# Patient Record
Sex: FEMALE | Race: WHITE | NOT HISPANIC OR LATINO | Employment: OTHER
[De-identification: names, ages, dates, MRNs, and addresses within clinical notes are randomized per-mention and may not be internally consistent; named-entity substitution may affect disease eponyms.]

---

## 2017-02-09 ENCOUNTER — RECORDS - HEALTHEAST (OUTPATIENT)
Dept: ADMINISTRATIVE | Facility: OTHER | Age: 72
End: 2017-02-09

## 2017-02-15 ENCOUNTER — RECORDS - HEALTHEAST (OUTPATIENT)
Dept: ADMINISTRATIVE | Facility: OTHER | Age: 72
End: 2017-02-15

## 2017-02-28 ENCOUNTER — OFFICE VISIT - HEALTHEAST (OUTPATIENT)
Dept: OTOLARYNGOLOGY | Facility: CLINIC | Age: 72
End: 2017-02-28

## 2017-02-28 ENCOUNTER — RECORDS - HEALTHEAST (OUTPATIENT)
Dept: ADMINISTRATIVE | Facility: OTHER | Age: 72
End: 2017-02-28

## 2017-02-28 DIAGNOSIS — J32.0 LEFT MAXILLARY SINUSITIS: ICD-10-CM

## 2017-02-28 DIAGNOSIS — R49.0 DYSPHONIA: ICD-10-CM

## 2017-02-28 ASSESSMENT — MIFFLIN-ST. JEOR: SCORE: 1100.97

## 2017-03-01 ENCOUNTER — HOSPITAL ENCOUNTER (OUTPATIENT)
Dept: CARDIOLOGY | Facility: CLINIC | Age: 72
Discharge: HOME | End: 2017-03-01
Attending: INTERNAL MEDICINE

## 2017-03-01 DIAGNOSIS — I42.9 CARDIOMYOPATHY, IDIOPATHIC (H): ICD-10-CM

## 2017-03-01 LAB
AORTIC ROOT: 2.7 CM
BSA FOR ECHO PROCEDURE: 1.71 M2
CV BLOOD PRESSURE: NORMAL MMHG
CV ECHO HEIGHT: 60 IN
CV ECHO WEIGHT: 152 LBS
DOP CALC LVOT AREA: 2.54 CM2
DOP CALC LVOT DIAMETER: 1.8 CM
DOP CALC LVOT PEAK VEL: 68.4 CM/S
DOP CALC LVOT STROKE VOLUME: 34.1 CM3
DOP CALCLVOT PEAK VEL VTI: 13.4 CM
EJECTION FRACTION: 40 % (ref 55–75)
FRACTIONAL SHORTENING: 21.8 % (ref 28–44)
INTERVENTRICULAR SEPTUM IN END DIASTOLE: 0.88 CM (ref 0.6–0.9)
IVS/PW RATIO: 1
LA AREA 1: 14.4 CM2
LA AREA 2: 14.5 CM2
LEFT ATRIUM LENGTH: 4.85 CM
LEFT ATRIUM SIZE: 3.4 CM
LEFT ATRIUM TO AORTIC ROOT RATIO: 1.26 NO UNITS
LEFT ATRIUM VOLUME INDEX: 21.4 ML/M2
LEFT ATRIUM VOLUME: 36.6 CM3
LEFT VENTRICLE CARDIAC INDEX: 1.5 L/MIN/M2
LEFT VENTRICLE CARDIAC OUTPUT: 2.6 L/MIN
LEFT VENTRICLE DIASTOLIC VOLUME INDEX: 58.5 CM3/M2 (ref 34–74)
LEFT VENTRICLE DIASTOLIC VOLUME: 100 CM3 (ref 46–106)
LEFT VENTRICLE HEART RATE: 75 BPM
LEFT VENTRICLE MASS INDEX: 111.3 G/M2
LEFT VENTRICLE SYSTOLIC VOLUME INDEX: 35.1 CM3/M2 (ref 11–31)
LEFT VENTRICLE SYSTOLIC VOLUME: 60 CM3 (ref 14–42)
LEFT VENTRICULAR INTERNAL DIMENSION IN DIASTOLE: 5.63 CM (ref 3.8–5.2)
LEFT VENTRICULAR INTERNAL DIMENSION IN SYSTOLE: 4.4 CM (ref 2.2–3.5)
LEFT VENTRICULAR MASS: 190.4 G
LEFT VENTRICULAR OUTFLOW TRACT MEAN GRADIENT: 1 MMHG
LEFT VENTRICULAR OUTFLOW TRACT MEAN VELOCITY: 50.6 CM/S
LEFT VENTRICULAR OUTFLOW TRACT PEAK GRADIENT: 2 MMHG
LEFT VENTRICULAR POSTERIOR WALL IN END DIASTOLE: 0.9 CM (ref 0.6–0.9)
LV STROKE VOLUME INDEX: 19.9 ML/M2
MITRAL VALVE E/A RATIO: 0.9
MV AVERAGE E/E' RATIO: 15.4 CM/S
MV DECELERATION TIME: 306 MS
MV E'TISSUE VEL-LAT: 6.04 CM/S
MV E'TISSUE VEL-MED: 5.65 CM/S
MV LATERAL E/E' RATIO: 15
MV MEDIAL E/E' RATIO: 16
MV PEAK A VELOCITY: 99.7 CM/S
MV PEAK E VELOCITY: 90.3 CM/S
NUC REST DIASTOLIC VOLUME INDEX: 2432 LBS
NUC REST SYSTOLIC VOLUME INDEX: 60 IN
TRICUSPID REGURGITATION PEAK PRESSURE GRADIENT: 34.1 MMHG
TRICUSPID VALVE PEAK REGURGITANT VELOCITY: 292 CM/S

## 2017-03-01 ASSESSMENT — MIFFLIN-ST. JEOR: SCORE: 1100.97

## 2017-03-03 ENCOUNTER — COMMUNICATION - HEALTHEAST (OUTPATIENT)
Dept: OTOLARYNGOLOGY | Facility: CLINIC | Age: 72
End: 2017-03-03

## 2017-03-10 ENCOUNTER — COMMUNICATION - HEALTHEAST (OUTPATIENT)
Dept: PULMONOLOGY | Facility: OTHER | Age: 72
End: 2017-03-10

## 2017-03-13 ENCOUNTER — AMBULATORY - HEALTHEAST (OUTPATIENT)
Dept: PULMONOLOGY | Facility: OTHER | Age: 72
End: 2017-03-13

## 2017-03-13 DIAGNOSIS — F17.210 CIGARETTE NICOTINE DEPENDENCE, UNCOMPLICATED: ICD-10-CM

## 2017-03-13 DIAGNOSIS — F17.200 NICOTINE DEPENDENCE: ICD-10-CM

## 2017-03-15 ENCOUNTER — OFFICE VISIT - HEALTHEAST (OUTPATIENT)
Dept: PULMONOLOGY | Facility: OTHER | Age: 72
End: 2017-03-15

## 2017-03-15 ENCOUNTER — HOSPITAL ENCOUNTER (OUTPATIENT)
Dept: CT IMAGING | Facility: HOSPITAL | Age: 72
Discharge: HOME | End: 2017-03-15
Attending: INTERNAL MEDICINE

## 2017-03-15 DIAGNOSIS — F17.200 NICOTINE DEPENDENCE: ICD-10-CM

## 2017-03-15 DIAGNOSIS — F17.211 NICOTINE DEPENDENCE, CIGARETTES, IN REMISSION: ICD-10-CM

## 2017-03-15 DIAGNOSIS — J44.9 COPD (CHRONIC OBSTRUCTIVE PULMONARY DISEASE) (H): ICD-10-CM

## 2017-03-15 DIAGNOSIS — F17.210 CIGARETTE NICOTINE DEPENDENCE, UNCOMPLICATED: ICD-10-CM

## 2017-03-15 RX ORDER — CHLORHEXIDINE GLUCONATE ORAL RINSE 1.2 MG/ML
15 SOLUTION DENTAL
Status: SHIPPED | COMMUNITY
Start: 2017-03-15

## 2017-04-24 ENCOUNTER — COMMUNICATION - HEALTHEAST (OUTPATIENT)
Dept: ADMINISTRATIVE | Facility: CLINIC | Age: 72
End: 2017-04-24

## 2017-05-14 ENCOUNTER — COMMUNICATION - HEALTHEAST (OUTPATIENT)
Dept: CARDIOLOGY | Facility: CLINIC | Age: 72
End: 2017-05-14

## 2017-05-17 ENCOUNTER — RECORDS - HEALTHEAST (OUTPATIENT)
Dept: ADMINISTRATIVE | Facility: OTHER | Age: 72
End: 2017-05-17

## 2017-05-22 ENCOUNTER — AMBULATORY - HEALTHEAST (OUTPATIENT)
Dept: CARDIOLOGY | Facility: CLINIC | Age: 72
End: 2017-05-22

## 2017-05-22 ENCOUNTER — RECORDS - HEALTHEAST (OUTPATIENT)
Dept: ADMINISTRATIVE | Facility: OTHER | Age: 72
End: 2017-05-22

## 2017-05-22 DIAGNOSIS — R09.89 LEFT CAROTID BRUIT: ICD-10-CM

## 2017-05-23 ENCOUNTER — RECORDS - HEALTHEAST (OUTPATIENT)
Dept: ADMINISTRATIVE | Facility: OTHER | Age: 72
End: 2017-05-23

## 2017-05-30 ENCOUNTER — HOSPITAL ENCOUNTER (OUTPATIENT)
Dept: ULTRASOUND IMAGING | Facility: CLINIC | Age: 72
Discharge: HOME | End: 2017-05-30
Attending: INTERNAL MEDICINE

## 2017-05-30 ENCOUNTER — COMMUNICATION - HEALTHEAST (OUTPATIENT)
Dept: CARDIOLOGY | Facility: CLINIC | Age: 72
End: 2017-05-30

## 2017-05-30 DIAGNOSIS — R09.89 LEFT CAROTID BRUIT: ICD-10-CM

## 2017-06-01 ENCOUNTER — COMMUNICATION - HEALTHEAST (OUTPATIENT)
Dept: CARDIOLOGY | Facility: CLINIC | Age: 72
End: 2017-06-01

## 2017-06-01 ENCOUNTER — RECORDS - HEALTHEAST (OUTPATIENT)
Dept: ADMINISTRATIVE | Facility: OTHER | Age: 72
End: 2017-06-01

## 2017-06-06 ENCOUNTER — OFFICE VISIT - HEALTHEAST (OUTPATIENT)
Dept: CARDIOLOGY | Facility: CLINIC | Age: 72
End: 2017-06-06

## 2017-06-06 ENCOUNTER — AMBULATORY - HEALTHEAST (OUTPATIENT)
Dept: CARDIOLOGY | Facility: CLINIC | Age: 72
End: 2017-06-06

## 2017-06-06 ENCOUNTER — RECORDS - HEALTHEAST (OUTPATIENT)
Dept: ADMINISTRATIVE | Facility: OTHER | Age: 72
End: 2017-06-06

## 2017-06-06 DIAGNOSIS — I10 ESSENTIAL HYPERTENSION: ICD-10-CM

## 2017-06-06 DIAGNOSIS — R06.09 DYSPNEA ON EXERTION: ICD-10-CM

## 2017-06-06 DIAGNOSIS — I65.22 CAROTID STENOSIS, ASYMPTOMATIC, LEFT: ICD-10-CM

## 2017-06-06 DIAGNOSIS — I42.9 CARDIOMYOPATHY, IDIOPATHIC (H): ICD-10-CM

## 2017-06-06 ASSESSMENT — MIFFLIN-ST. JEOR: SCORE: 1114.58

## 2017-06-09 ENCOUNTER — RECORDS - HEALTHEAST (OUTPATIENT)
Dept: ADMINISTRATIVE | Facility: OTHER | Age: 72
End: 2017-06-09

## 2017-06-20 ENCOUNTER — RECORDS - HEALTHEAST (OUTPATIENT)
Dept: ADMINISTRATIVE | Facility: OTHER | Age: 72
End: 2017-06-20

## 2017-06-30 ENCOUNTER — RECORDS - HEALTHEAST (OUTPATIENT)
Dept: ADMINISTRATIVE | Facility: OTHER | Age: 72
End: 2017-06-30

## 2017-07-24 ENCOUNTER — COMMUNICATION - HEALTHEAST (OUTPATIENT)
Dept: ONCOLOGY | Facility: HOSPITAL | Age: 72
End: 2017-07-24

## 2017-07-24 ENCOUNTER — RECORDS - HEALTHEAST (OUTPATIENT)
Dept: ADMINISTRATIVE | Facility: OTHER | Age: 72
End: 2017-07-24

## 2017-07-25 ENCOUNTER — COMMUNICATION - HEALTHEAST (OUTPATIENT)
Dept: ONCOLOGY | Facility: HOSPITAL | Age: 72
End: 2017-07-25

## 2017-07-27 ENCOUNTER — RECORDS - HEALTHEAST (OUTPATIENT)
Dept: RADIOLOGY | Facility: CLINIC | Age: 72
End: 2017-07-27

## 2017-07-27 ENCOUNTER — OFFICE VISIT - HEALTHEAST (OUTPATIENT)
Dept: RADIATION ONCOLOGY | Facility: CLINIC | Age: 72
End: 2017-07-27

## 2017-07-27 ENCOUNTER — COMMUNICATION - HEALTHEAST (OUTPATIENT)
Dept: ONCOLOGY | Facility: HOSPITAL | Age: 72
End: 2017-07-27

## 2017-07-27 ENCOUNTER — RECORDS - HEALTHEAST (OUTPATIENT)
Dept: ADMINISTRATIVE | Facility: OTHER | Age: 72
End: 2017-07-27

## 2017-07-27 DIAGNOSIS — C50.112 MALIGNANT NEOPLASM OF CENTRAL PORTION OF LEFT FEMALE BREAST (H): ICD-10-CM

## 2017-07-27 ASSESSMENT — MIFFLIN-ST. JEOR: SCORE: 1105.5

## 2017-07-31 ENCOUNTER — RECORDS - HEALTHEAST (OUTPATIENT)
Dept: ADMINISTRATIVE | Facility: OTHER | Age: 72
End: 2017-07-31

## 2017-08-01 ENCOUNTER — COMMUNICATION - HEALTHEAST (OUTPATIENT)
Dept: ONCOLOGY | Facility: CLINIC | Age: 72
End: 2017-08-01

## 2017-08-01 ENCOUNTER — OFFICE VISIT - HEALTHEAST (OUTPATIENT)
Dept: ONCOLOGY | Facility: CLINIC | Age: 72
End: 2017-08-01

## 2017-08-01 DIAGNOSIS — C50.112 MALIGNANT NEOPLASM OF CENTRAL PORTION OF LEFT FEMALE BREAST (H): ICD-10-CM

## 2017-08-01 ASSESSMENT — MIFFLIN-ST. JEOR: SCORE: 1105.5

## 2017-08-02 ENCOUNTER — COMMUNICATION - HEALTHEAST (OUTPATIENT)
Dept: ONCOLOGY | Facility: HOSPITAL | Age: 72
End: 2017-08-02

## 2017-08-02 ENCOUNTER — HOSPITAL ENCOUNTER (OUTPATIENT)
Dept: CT IMAGING | Facility: CLINIC | Age: 72
Discharge: HOME | End: 2017-08-02

## 2017-08-02 DIAGNOSIS — M25.552 PAIN OF LEFT HIP JOINT: ICD-10-CM

## 2017-08-03 ENCOUNTER — AMBULATORY - HEALTHEAST (OUTPATIENT)
Dept: RADIATION ONCOLOGY | Facility: HOSPITAL | Age: 72
End: 2017-08-03

## 2017-08-03 ENCOUNTER — RECORDS - HEALTHEAST (OUTPATIENT)
Dept: ADMINISTRATIVE | Facility: OTHER | Age: 72
End: 2017-08-03

## 2017-08-09 ENCOUNTER — COMMUNICATION - HEALTHEAST (OUTPATIENT)
Dept: CARDIOLOGY | Facility: CLINIC | Age: 72
End: 2017-08-09

## 2017-08-14 ENCOUNTER — COMMUNICATION - HEALTHEAST (OUTPATIENT)
Dept: ONCOLOGY | Facility: HOSPITAL | Age: 72
End: 2017-08-14

## 2017-08-17 ENCOUNTER — COMMUNICATION - HEALTHEAST (OUTPATIENT)
Dept: CARDIOLOGY | Facility: CLINIC | Age: 72
End: 2017-08-17

## 2017-08-23 ENCOUNTER — OFFICE VISIT - HEALTHEAST (OUTPATIENT)
Dept: RADIATION ONCOLOGY | Facility: CLINIC | Age: 72
End: 2017-08-23

## 2017-08-23 DIAGNOSIS — C50.112 MALIGNANT NEOPLASM OF CENTRAL PORTION OF LEFT FEMALE BREAST (H): ICD-10-CM

## 2017-08-23 ASSESSMENT — MIFFLIN-ST. JEOR: SCORE: 1098.25

## 2017-08-30 ENCOUNTER — OFFICE VISIT - HEALTHEAST (OUTPATIENT)
Dept: RADIATION ONCOLOGY | Facility: CLINIC | Age: 72
End: 2017-08-30

## 2017-08-30 DIAGNOSIS — C50.112 MALIGNANT NEOPLASM OF CENTRAL PORTION OF LEFT FEMALE BREAST (H): ICD-10-CM

## 2017-08-30 ASSESSMENT — MIFFLIN-ST. JEOR: SCORE: 1095.52

## 2017-09-06 ENCOUNTER — OFFICE VISIT - HEALTHEAST (OUTPATIENT)
Dept: RADIATION ONCOLOGY | Facility: CLINIC | Age: 72
End: 2017-09-06

## 2017-09-06 ENCOUNTER — AMBULATORY - HEALTHEAST (OUTPATIENT)
Dept: RADIATION ONCOLOGY | Facility: CLINIC | Age: 72
End: 2017-09-06

## 2017-09-06 DIAGNOSIS — C50.112 MALIGNANT NEOPLASM OF CENTRAL PORTION OF LEFT FEMALE BREAST (H): ICD-10-CM

## 2017-09-08 ENCOUNTER — OFFICE VISIT - HEALTHEAST (OUTPATIENT)
Dept: RADIATION ONCOLOGY | Facility: CLINIC | Age: 72
End: 2017-09-08

## 2017-09-08 DIAGNOSIS — C50.112 MALIGNANT NEOPLASM OF CENTRAL PORTION OF LEFT FEMALE BREAST (H): ICD-10-CM

## 2017-09-08 ASSESSMENT — MIFFLIN-ST. JEOR: SCORE: 1103.69

## 2017-09-12 ENCOUNTER — OFFICE VISIT - HEALTHEAST (OUTPATIENT)
Dept: ONCOLOGY | Facility: CLINIC | Age: 72
End: 2017-09-12

## 2017-09-12 DIAGNOSIS — C50.112 MALIGNANT NEOPLASM OF CENTRAL PORTION OF LEFT FEMALE BREAST (H): ICD-10-CM

## 2017-09-18 ENCOUNTER — COMMUNICATION - HEALTHEAST (OUTPATIENT)
Dept: RADIATION ONCOLOGY | Facility: CLINIC | Age: 72
End: 2017-09-18

## 2017-09-25 ENCOUNTER — COMMUNICATION - HEALTHEAST (OUTPATIENT)
Dept: ONCOLOGY | Facility: CLINIC | Age: 72
End: 2017-09-25

## 2017-11-09 ENCOUNTER — AMBULATORY - HEALTHEAST (OUTPATIENT)
Dept: PULMONOLOGY | Facility: OTHER | Age: 72
End: 2017-11-09

## 2017-11-09 DIAGNOSIS — F17.210 CIGARETTE NICOTINE DEPENDENCE WITHOUT COMPLICATION: ICD-10-CM

## 2017-11-28 ENCOUNTER — COMMUNICATION - HEALTHEAST (OUTPATIENT)
Dept: CARDIOLOGY | Facility: CLINIC | Age: 72
End: 2017-11-28

## 2017-12-01 ENCOUNTER — COMMUNICATION - HEALTHEAST (OUTPATIENT)
Dept: ONCOLOGY | Facility: CLINIC | Age: 72
End: 2017-12-01

## 2017-12-06 ENCOUNTER — OFFICE VISIT - HEALTHEAST (OUTPATIENT)
Dept: ONCOLOGY | Facility: CLINIC | Age: 72
End: 2017-12-06

## 2017-12-06 DIAGNOSIS — Z17.0 MALIGNANT NEOPLASM OF CENTRAL PORTION OF LEFT BREAST IN FEMALE, ESTROGEN RECEPTOR POSITIVE (H): ICD-10-CM

## 2017-12-06 DIAGNOSIS — C50.112 MALIGNANT NEOPLASM OF CENTRAL PORTION OF LEFT BREAST IN FEMALE, ESTROGEN RECEPTOR POSITIVE (H): ICD-10-CM

## 2017-12-06 ASSESSMENT — MIFFLIN-ST. JEOR: SCORE: 1061.96

## 2017-12-20 ENCOUNTER — COMMUNICATION - HEALTHEAST (OUTPATIENT)
Dept: ONCOLOGY | Facility: CLINIC | Age: 72
End: 2017-12-20

## 2018-01-08 ENCOUNTER — COMMUNICATION - HEALTHEAST (OUTPATIENT)
Dept: ONCOLOGY | Facility: CLINIC | Age: 73
End: 2018-01-08

## 2018-01-10 ENCOUNTER — OFFICE VISIT - HEALTHEAST (OUTPATIENT)
Dept: ONCOLOGY | Facility: CLINIC | Age: 73
End: 2018-01-10

## 2018-01-10 ENCOUNTER — COMMUNICATION - HEALTHEAST (OUTPATIENT)
Dept: ONCOLOGY | Facility: CLINIC | Age: 73
End: 2018-01-10

## 2018-01-10 DIAGNOSIS — C50.112 MALIGNANT NEOPLASM OF CENTRAL PORTION OF LEFT BREAST IN FEMALE, ESTROGEN RECEPTOR POSITIVE (H): ICD-10-CM

## 2018-01-10 DIAGNOSIS — Z17.0 MALIGNANT NEOPLASM OF CENTRAL PORTION OF LEFT BREAST IN FEMALE, ESTROGEN RECEPTOR POSITIVE (H): ICD-10-CM

## 2018-01-18 ENCOUNTER — COMMUNICATION - HEALTHEAST (OUTPATIENT)
Dept: CARDIOLOGY | Facility: CLINIC | Age: 73
End: 2018-01-18

## 2018-01-24 ENCOUNTER — RECORDS - HEALTHEAST (OUTPATIENT)
Dept: ADMINISTRATIVE | Facility: OTHER | Age: 73
End: 2018-01-24

## 2018-01-25 NOTE — TELEPHONE ENCOUNTER
APPT INFO    Date /Time: 3/5/18 at 11:30AM    Reason for Appt: Chronic cough   Ref Provider/Clinic: [facility]   Are there internal records? Yes/No?  IF YES, list clinic names: No   Are there outside records? Yes/No? Yes - M[facility]al[facility]   Patient Contact (Y/N) & Call Details: Transferred from , spoke to pt, she is being referred from [facility] and has also been seen at [facility] by Dr Mann. Mailed IRENE to pt incase it is needed for appointment.   Action: Faxed imaging requests.     OUTSIDE RECORDS CHECKLIST     CLINIC NAME COMMENTS REC (x) IMG (x)   [facility]  (Use Care Everywhere) OFFICE NOTES: 9/22/15,4/21/16, 5/19/16, 2/9/17, 4/27/17, 6/8/17, 1/18/18  RADIOLOGY: (PACS)  CT Chest 4/22/14, 1/24/18  XR Chest 12/28/15, 1/8/16    PROCEDURES: PFT 6/8/17, 2/9/17   X X   [facility]  (Use Care Everywhere) OFFICE NOTES: 3/15/17  RADIOLOGY: (PACS)  CT Chest 2/26/13  ER/HOSP: 3/29/16 X X

## 2018-02-19 ENCOUNTER — COMMUNICATION - HEALTHEAST (OUTPATIENT)
Dept: ONCOLOGY | Facility: CLINIC | Age: 73
End: 2018-02-19

## 2018-02-19 DIAGNOSIS — Z17.0 MALIGNANT NEOPLASM OF CENTRAL PORTION OF LEFT BREAST IN FEMALE, ESTROGEN RECEPTOR POSITIVE (H): ICD-10-CM

## 2018-02-19 DIAGNOSIS — R22.2 PLEURAL NODULES: ICD-10-CM

## 2018-02-19 DIAGNOSIS — C50.112 MALIGNANT NEOPLASM OF CENTRAL PORTION OF LEFT BREAST IN FEMALE, ESTROGEN RECEPTOR POSITIVE (H): ICD-10-CM

## 2018-02-19 DIAGNOSIS — R23.2 VASOMOTOR FLUSHING: ICD-10-CM

## 2018-02-22 ENCOUNTER — COMMUNICATION - HEALTHEAST (OUTPATIENT)
Dept: ONCOLOGY | Facility: CLINIC | Age: 73
End: 2018-02-22

## 2018-03-05 ENCOUNTER — PRE VISIT (OUTPATIENT)
Dept: OTOLARYNGOLOGY | Facility: CLINIC | Age: 73
End: 2018-03-05

## 2018-03-29 ENCOUNTER — COMMUNICATION - HEALTHEAST (OUTPATIENT)
Dept: ONCOLOGY | Facility: CLINIC | Age: 73
End: 2018-03-29

## 2018-03-29 DIAGNOSIS — R23.2 HOT FLASHES: ICD-10-CM

## 2018-03-30 ENCOUNTER — AMBULATORY - HEALTHEAST (OUTPATIENT)
Dept: ONCOLOGY | Facility: CLINIC | Age: 73
End: 2018-03-30

## 2018-03-30 DIAGNOSIS — T45.1X5A HOT FLASHES DUE TO TAMOXIFEN: ICD-10-CM

## 2018-03-30 DIAGNOSIS — R23.2 HOT FLASHES DUE TO TAMOXIFEN: ICD-10-CM

## 2018-05-08 ENCOUNTER — AMBULATORY - HEALTHEAST (OUTPATIENT)
Dept: ONCOLOGY | Facility: CLINIC | Age: 73
End: 2018-05-08

## 2018-05-08 DIAGNOSIS — T45.1X5A HOT FLASHES DUE TO TAMOXIFEN: ICD-10-CM

## 2018-05-08 DIAGNOSIS — R23.2 HOT FLASHES DUE TO TAMOXIFEN: ICD-10-CM

## 2018-05-15 ENCOUNTER — AMBULATORY - HEALTHEAST (OUTPATIENT)
Dept: ONCOLOGY | Facility: CLINIC | Age: 73
End: 2018-05-15

## 2018-05-15 DIAGNOSIS — R23.2 HOT FLASHES DUE TO TAMOXIFEN: ICD-10-CM

## 2018-05-15 DIAGNOSIS — M79.605 LEG PAIN, LEFT: ICD-10-CM

## 2018-05-15 DIAGNOSIS — T45.1X5A HOT FLASHES DUE TO TAMOXIFEN: ICD-10-CM

## 2018-05-21 ENCOUNTER — RECORDS - HEALTHEAST (OUTPATIENT)
Dept: ADMINISTRATIVE | Facility: OTHER | Age: 73
End: 2018-05-21

## 2018-05-22 ENCOUNTER — AMBULATORY - HEALTHEAST (OUTPATIENT)
Dept: ONCOLOGY | Facility: CLINIC | Age: 73
End: 2018-05-22

## 2018-05-22 DIAGNOSIS — M79.605 LEG PAIN, LEFT: ICD-10-CM

## 2018-05-22 DIAGNOSIS — R23.2 HOT FLASHES DUE TO TAMOXIFEN: ICD-10-CM

## 2018-05-22 DIAGNOSIS — T45.1X5A HOT FLASHES DUE TO TAMOXIFEN: ICD-10-CM

## 2018-05-25 ENCOUNTER — COMMUNICATION - HEALTHEAST (OUTPATIENT)
Dept: CARDIOLOGY | Facility: CLINIC | Age: 73
End: 2018-05-25

## 2018-06-05 ENCOUNTER — AMBULATORY - HEALTHEAST (OUTPATIENT)
Dept: ONCOLOGY | Facility: CLINIC | Age: 73
End: 2018-06-05

## 2018-06-05 DIAGNOSIS — T45.1X5A HOT FLASHES DUE TO TAMOXIFEN: ICD-10-CM

## 2018-06-05 DIAGNOSIS — M79.605 LEG PAIN, LEFT: ICD-10-CM

## 2018-06-05 DIAGNOSIS — R23.2 HOT FLASHES DUE TO TAMOXIFEN: ICD-10-CM

## 2018-06-12 ENCOUNTER — AMBULATORY - HEALTHEAST (OUTPATIENT)
Dept: ONCOLOGY | Facility: CLINIC | Age: 73
End: 2018-06-12

## 2018-06-12 DIAGNOSIS — M79.605 LEG PAIN, LEFT: ICD-10-CM

## 2018-06-12 DIAGNOSIS — R23.2 HOT FLASHES DUE TO TAMOXIFEN: ICD-10-CM

## 2018-06-12 DIAGNOSIS — T45.1X5A HOT FLASHES DUE TO TAMOXIFEN: ICD-10-CM

## 2018-06-19 ENCOUNTER — AMBULATORY - HEALTHEAST (OUTPATIENT)
Dept: ONCOLOGY | Facility: CLINIC | Age: 73
End: 2018-06-19

## 2018-06-19 DIAGNOSIS — T45.1X5A HOT FLASHES DUE TO TAMOXIFEN: ICD-10-CM

## 2018-06-19 DIAGNOSIS — M79.605 LEG PAIN, LEFT: ICD-10-CM

## 2018-06-19 DIAGNOSIS — R23.2 HOT FLASHES DUE TO TAMOXIFEN: ICD-10-CM

## 2018-07-12 ENCOUNTER — AMBULATORY - HEALTHEAST (OUTPATIENT)
Dept: ONCOLOGY | Facility: CLINIC | Age: 73
End: 2018-07-12

## 2018-07-12 ENCOUNTER — OFFICE VISIT - HEALTHEAST (OUTPATIENT)
Dept: ONCOLOGY | Facility: CLINIC | Age: 73
End: 2018-07-12

## 2018-07-12 DIAGNOSIS — N95.1 MENOPAUSAL SYNDROME (HOT FLASHES): ICD-10-CM

## 2018-07-12 DIAGNOSIS — M79.89 RIGHT LEG SWELLING: ICD-10-CM

## 2018-07-12 ASSESSMENT — MIFFLIN-ST. JEOR: SCORE: 1086.91

## 2018-07-17 ENCOUNTER — HOSPITAL ENCOUNTER (OUTPATIENT)
Dept: ULTRASOUND IMAGING | Facility: CLINIC | Age: 73
Setting detail: RADIATION/ONCOLOGY SERIES
Discharge: STILL ADMITTED OR ADMINISTRATIVE | End: 2018-07-17
Attending: INTERNAL MEDICINE

## 2018-07-17 ENCOUNTER — COMMUNICATION - HEALTHEAST (OUTPATIENT)
Dept: ONCOLOGY | Facility: CLINIC | Age: 73
End: 2018-07-17

## 2018-07-17 DIAGNOSIS — M79.89 RIGHT LEG SWELLING: ICD-10-CM

## 2018-08-07 ENCOUNTER — COMMUNICATION - HEALTHEAST (OUTPATIENT)
Dept: ONCOLOGY | Facility: CLINIC | Age: 73
End: 2018-08-07

## 2018-09-12 ENCOUNTER — COMMUNICATION - HEALTHEAST (OUTPATIENT)
Dept: ONCOLOGY | Facility: CLINIC | Age: 73
End: 2018-09-12

## 2018-09-21 ENCOUNTER — COMMUNICATION - HEALTHEAST (OUTPATIENT)
Dept: ONCOLOGY | Facility: CLINIC | Age: 73
End: 2018-09-21

## 2018-09-25 ENCOUNTER — COMMUNICATION - HEALTHEAST (OUTPATIENT)
Dept: ONCOLOGY | Facility: CLINIC | Age: 73
End: 2018-09-25

## 2018-10-04 ENCOUNTER — RECORDS - HEALTHEAST (OUTPATIENT)
Dept: ADMINISTRATIVE | Facility: OTHER | Age: 73
End: 2018-10-04

## 2018-10-05 ENCOUNTER — COMMUNICATION - HEALTHEAST (OUTPATIENT)
Dept: ONCOLOGY | Facility: CLINIC | Age: 73
End: 2018-10-05

## 2018-10-08 ENCOUNTER — RECORDS - HEALTHEAST (OUTPATIENT)
Dept: ADMINISTRATIVE | Facility: OTHER | Age: 73
End: 2018-10-08

## 2018-10-09 ENCOUNTER — RECORDS - HEALTHEAST (OUTPATIENT)
Dept: ADMINISTRATIVE | Facility: OTHER | Age: 73
End: 2018-10-09

## 2018-10-09 ENCOUNTER — HOSPITAL ENCOUNTER (OUTPATIENT)
Dept: CT IMAGING | Facility: CLINIC | Age: 73
Discharge: HOME | End: 2018-10-09

## 2018-10-09 DIAGNOSIS — R06.02 SHORTNESS OF BREATH: ICD-10-CM

## 2018-10-09 LAB
CREAT BLD-MCNC: 1.2 MG/DL
POC GFR AMER AF HE - HISTORICAL: 53 ML/MIN/1.73M2
POC GFR NON AMER AF HE - HISTORICAL: 44 ML/MIN/1.73M2

## 2018-10-24 ENCOUNTER — RECORDS - HEALTHEAST (OUTPATIENT)
Dept: ADMINISTRATIVE | Facility: OTHER | Age: 73
End: 2018-10-24

## 2018-10-29 ENCOUNTER — COMMUNICATION - HEALTHEAST (OUTPATIENT)
Dept: ONCOLOGY | Facility: CLINIC | Age: 73
End: 2018-10-29

## 2018-10-30 ENCOUNTER — AMBULATORY - HEALTHEAST (OUTPATIENT)
Dept: ONCOLOGY | Facility: CLINIC | Age: 73
End: 2018-10-30

## 2018-10-30 DIAGNOSIS — N63.10 BREAST MASS, RIGHT: ICD-10-CM

## 2018-10-30 DIAGNOSIS — N64.9 DISORDER OF BREAST: ICD-10-CM

## 2018-10-30 DIAGNOSIS — N63.13 MASS OF LOWER OUTER QUADRANT OF RIGHT BREAST: ICD-10-CM

## 2018-10-31 ENCOUNTER — HOSPITAL ENCOUNTER (OUTPATIENT)
Dept: CARDIOLOGY | Facility: CLINIC | Age: 73
Discharge: HOME | End: 2018-10-31

## 2018-10-31 DIAGNOSIS — I63.9 OCCIPITAL STROKE (H): ICD-10-CM

## 2018-10-31 ASSESSMENT — MIFFLIN-ST. JEOR: SCORE: 1082.82

## 2018-11-01 LAB
AORTIC ROOT: 2.9 CM
BSA FOR ECHO PROCEDURE: 1.68 M2
CV BLOOD PRESSURE: NORMAL MMHG
CV ECHO HEIGHT: 60 IN
CV ECHO WEIGHT: 148 LBS
DOP CALC LVOT AREA: 2.54 CM2
DOP CALC LVOT DIAMETER: 1.8 CM
DOP CALC LVOT PEAK VEL: 76.7 CM/S
DOP CALC LVOT STROKE VOLUME: 38.4 CM3
DOP CALCLVOT PEAK VEL VTI: 15.1 CM
EJECTION FRACTION: 40 % (ref 55–75)
FRACTIONAL SHORTENING: 15.4 % (ref 28–44)
INTERVENTRICULAR SEPTUM IN END DIASTOLE: 1.19 CM (ref 0.6–0.9)
IVS/PW RATIO: 1.1
LA AREA 1: 17.2 CM2
LA AREA 2: 16.4 CM2
LEFT ATRIUM LENGTH: 4.8 CM
LEFT ATRIUM SIZE: 3.7 CM
LEFT ATRIUM TO AORTIC ROOT RATIO: 1.28 NO UNITS
LEFT ATRIUM VOLUME INDEX: 29.7 ML/M2
LEFT ATRIUM VOLUME: 50 ML
LEFT VENTRICLE CARDIAC INDEX: 1.5 L/MIN/M2
LEFT VENTRICLE CARDIAC OUTPUT: 2.5 L/MIN
LEFT VENTRICLE DIASTOLIC VOLUME INDEX: 50 CM3/M2 (ref 34–74)
LEFT VENTRICLE DIASTOLIC VOLUME: 84 CM3 (ref 46–106)
LEFT VENTRICLE HEART RATE: 65 BPM
LEFT VENTRICLE MASS INDEX: 123.1 G/M2
LEFT VENTRICLE SYSTOLIC VOLUME INDEX: 29.8 CM3/M2 (ref 11–31)
LEFT VENTRICLE SYSTOLIC VOLUME: 50 CM3 (ref 14–42)
LEFT VENTRICULAR INTERNAL DIMENSION IN DIASTOLE: 4.88 CM (ref 3.8–5.2)
LEFT VENTRICULAR INTERNAL DIMENSION IN SYSTOLE: 4.13 CM (ref 2.2–3.5)
LEFT VENTRICULAR MASS: 206.8 G
LEFT VENTRICULAR OUTFLOW TRACT MEAN GRADIENT: 1 MMHG
LEFT VENTRICULAR OUTFLOW TRACT MEAN VELOCITY: 49.2 CM/S
LEFT VENTRICULAR OUTFLOW TRACT PEAK GRADIENT: 2 MMHG
LEFT VENTRICULAR POSTERIOR WALL IN END DIASTOLE: 1.07 CM (ref 0.6–0.9)
LV STROKE VOLUME INDEX: 22.9 ML/M2
MITRAL VALVE E/A RATIO: 0.4
MV AVERAGE E/E' RATIO: 15.1 CM/S
MV DECELERATION TIME: 292 MS
MV E'TISSUE VEL-LAT: 3.7 CM/S
MV E'TISSUE VEL-MED: 3.41 CM/S
MV LATERAL E/E' RATIO: 14.5
MV MEDIAL E/E' RATIO: 15.8
MV PEAK A VELOCITY: 123 CM/S
MV PEAK E VELOCITY: 53.8 CM/S
NUC REST DIASTOLIC VOLUME INDEX: 2368 LBS
NUC REST SYSTOLIC VOLUME INDEX: 60 IN
RIGHT VENTRICULAR INTERNAL DIMENSION IN DYSTOLE: 2.18 CM
TRICUSPID VALVE ANULAR PLANE SYSTOLIC EXCURSION: 1.8 CM

## 2018-11-02 ENCOUNTER — HOSPITAL ENCOUNTER (OUTPATIENT)
Dept: MAMMOGRAPHY | Facility: CLINIC | Age: 73
Setting detail: RADIATION/ONCOLOGY SERIES
Discharge: STILL ADMITTED OR ADMINISTRATIVE | End: 2018-11-02
Attending: INTERNAL MEDICINE

## 2018-11-02 ENCOUNTER — HOSPITAL ENCOUNTER (OUTPATIENT)
Dept: ULTRASOUND IMAGING | Facility: CLINIC | Age: 73
Setting detail: RADIATION/ONCOLOGY SERIES
Discharge: STILL ADMITTED OR ADMINISTRATIVE | End: 2018-11-02
Attending: INTERNAL MEDICINE

## 2018-11-02 DIAGNOSIS — N63.10 BREAST MASS, RIGHT: ICD-10-CM

## 2018-11-02 DIAGNOSIS — N64.9 DISORDER OF BREAST: ICD-10-CM

## 2018-11-02 DIAGNOSIS — N63.13 MASS OF LOWER OUTER QUADRANT OF RIGHT BREAST: ICD-10-CM

## 2018-11-06 ENCOUNTER — RECORDS - HEALTHEAST (OUTPATIENT)
Dept: ADMINISTRATIVE | Facility: OTHER | Age: 73
End: 2018-11-06

## 2018-11-14 ENCOUNTER — OFFICE VISIT - HEALTHEAST (OUTPATIENT)
Dept: ONCOLOGY | Facility: CLINIC | Age: 73
End: 2018-11-14

## 2018-11-14 DIAGNOSIS — C50.112 MALIGNANT NEOPLASM OF CENTRAL PORTION OF LEFT BREAST IN FEMALE, ESTROGEN RECEPTOR POSITIVE (H): ICD-10-CM

## 2018-11-14 DIAGNOSIS — Z17.0 MALIGNANT NEOPLASM OF CENTRAL PORTION OF LEFT BREAST IN FEMALE, ESTROGEN RECEPTOR POSITIVE (H): ICD-10-CM

## 2018-11-14 RX ORDER — AZELASTINE 1 MG/ML
2 SPRAY, METERED NASAL DAILY
Status: SHIPPED | COMMUNITY
Start: 2018-07-19 | End: 2022-04-07

## 2018-11-15 ENCOUNTER — COMMUNICATION - HEALTHEAST (OUTPATIENT)
Dept: ONCOLOGY | Facility: CLINIC | Age: 73
End: 2018-11-15

## 2018-12-11 ENCOUNTER — AMBULATORY - HEALTHEAST (OUTPATIENT)
Dept: ONCOLOGY | Facility: CLINIC | Age: 73
End: 2018-12-11

## 2018-12-11 DIAGNOSIS — Z17.0 MALIGNANT NEOPLASM OF CENTRAL PORTION OF LEFT BREAST IN FEMALE, ESTROGEN RECEPTOR POSITIVE (H): ICD-10-CM

## 2018-12-11 DIAGNOSIS — M79.605 LEG PAIN, LEFT: ICD-10-CM

## 2018-12-11 DIAGNOSIS — C50.112 MALIGNANT NEOPLASM OF CENTRAL PORTION OF LEFT BREAST IN FEMALE, ESTROGEN RECEPTOR POSITIVE (H): ICD-10-CM

## 2018-12-14 ENCOUNTER — COMMUNICATION - HEALTHEAST (OUTPATIENT)
Dept: ONCOLOGY | Facility: HOSPITAL | Age: 73
End: 2018-12-14

## 2018-12-17 ENCOUNTER — COMMUNICATION - HEALTHEAST (OUTPATIENT)
Dept: ONCOLOGY | Facility: CLINIC | Age: 73
End: 2018-12-17

## 2018-12-21 ENCOUNTER — COMMUNICATION - HEALTHEAST (OUTPATIENT)
Dept: ONCOLOGY | Facility: HOSPITAL | Age: 73
End: 2018-12-21

## 2018-12-26 ENCOUNTER — COMMUNICATION - HEALTHEAST (OUTPATIENT)
Dept: ONCOLOGY | Facility: CLINIC | Age: 73
End: 2018-12-26

## 2018-12-31 ENCOUNTER — AMBULATORY - HEALTHEAST (OUTPATIENT)
Dept: CARE COORDINATION | Facility: CLINIC | Age: 73
End: 2018-12-31

## 2019-01-03 ENCOUNTER — OFFICE VISIT - HEALTHEAST (OUTPATIENT)
Dept: ONCOLOGY | Facility: CLINIC | Age: 74
End: 2019-01-03

## 2019-01-03 DIAGNOSIS — R91.8 MULTIPLE LUNG NODULES ON CT: ICD-10-CM

## 2019-01-03 DIAGNOSIS — C50.112 MALIGNANT NEOPLASM OF CENTRAL PORTION OF LEFT BREAST IN FEMALE, ESTROGEN RECEPTOR POSITIVE (H): ICD-10-CM

## 2019-01-03 DIAGNOSIS — Z17.0 MALIGNANT NEOPLASM OF CENTRAL PORTION OF LEFT BREAST IN FEMALE, ESTROGEN RECEPTOR POSITIVE (H): ICD-10-CM

## 2019-02-21 ENCOUNTER — OFFICE VISIT - HEALTHEAST (OUTPATIENT)
Dept: FAMILY MEDICINE | Facility: CLINIC | Age: 74
End: 2019-02-21

## 2019-02-21 DIAGNOSIS — Z00.00 ROUTINE HEALTH MAINTENANCE: ICD-10-CM

## 2019-02-21 DIAGNOSIS — M16.12 OSTEOARTHRITIS OF LEFT HIP, UNSPECIFIED OSTEOARTHRITIS TYPE: ICD-10-CM

## 2019-02-21 DIAGNOSIS — B02.29 POSTHERPETIC NEURALGIA: ICD-10-CM

## 2019-02-21 DIAGNOSIS — J44.1 COPD EXACERBATION (H): ICD-10-CM

## 2019-02-21 DIAGNOSIS — I10 ESSENTIAL HYPERTENSION: ICD-10-CM

## 2019-02-21 DIAGNOSIS — N32.81 OAB (OVERACTIVE BLADDER): ICD-10-CM

## 2019-02-21 DIAGNOSIS — M81.0 OSTEOPOROSIS, UNSPECIFIED OSTEOPOROSIS TYPE, UNSPECIFIED PATHOLOGICAL FRACTURE PRESENCE: ICD-10-CM

## 2019-02-21 DIAGNOSIS — I42.9 CARDIOMYOPATHY, IDIOPATHIC (H): ICD-10-CM

## 2019-02-21 ASSESSMENT — MIFFLIN-ST. JEOR: SCORE: 1124.89

## 2019-03-04 ENCOUNTER — COMMUNICATION - HEALTHEAST (OUTPATIENT)
Dept: RESPIRATORY THERAPY | Facility: CLINIC | Age: 74
End: 2019-03-04

## 2019-03-05 ENCOUNTER — AMBULATORY - HEALTHEAST (OUTPATIENT)
Dept: PHARMACY | Facility: CLINIC | Age: 74
End: 2019-03-05

## 2019-03-05 DIAGNOSIS — B02.29 POSTHERPETIC NEURALGIA: ICD-10-CM

## 2019-03-05 DIAGNOSIS — M81.0 OSTEOPOROSIS, UNSPECIFIED OSTEOPOROSIS TYPE, UNSPECIFIED PATHOLOGICAL FRACTURE PRESENCE: ICD-10-CM

## 2019-03-05 DIAGNOSIS — F41.8 DEPRESSION WITH ANXIETY: ICD-10-CM

## 2019-03-05 DIAGNOSIS — R32 URINARY INCONTINENCE, UNSPECIFIED TYPE: ICD-10-CM

## 2019-03-05 DIAGNOSIS — I42.9 CARDIOMYOPATHY, IDIOPATHIC (H): ICD-10-CM

## 2019-03-05 DIAGNOSIS — J44.1 COPD EXACERBATION (H): ICD-10-CM

## 2019-03-05 DIAGNOSIS — K21.9 GASTROESOPHAGEAL REFLUX DISEASE WITHOUT ESOPHAGITIS: ICD-10-CM

## 2019-03-05 DIAGNOSIS — E78.5 DYSLIPIDEMIA: ICD-10-CM

## 2019-03-06 ENCOUNTER — COMMUNICATION - HEALTHEAST (OUTPATIENT)
Dept: RESPIRATORY THERAPY | Facility: CLINIC | Age: 74
End: 2019-03-06

## 2019-03-08 ENCOUNTER — COMMUNICATION - HEALTHEAST (OUTPATIENT)
Dept: FAMILY MEDICINE | Facility: CLINIC | Age: 74
End: 2019-03-08

## 2019-03-08 ENCOUNTER — OFFICE VISIT - HEALTHEAST (OUTPATIENT)
Dept: FAMILY MEDICINE | Facility: CLINIC | Age: 74
End: 2019-03-08

## 2019-03-08 DIAGNOSIS — J44.1 COPD EXACERBATION (H): ICD-10-CM

## 2019-03-08 DIAGNOSIS — Z86.73 HISTORY OF TIA (TRANSIENT ISCHEMIC ATTACK) AND STROKE: ICD-10-CM

## 2019-03-08 DIAGNOSIS — I42.9 CARDIOMYOPATHY, IDIOPATHIC (H): ICD-10-CM

## 2019-03-08 DIAGNOSIS — I50.22 CHRONIC SYSTOLIC HEART FAILURE (H): ICD-10-CM

## 2019-03-08 LAB
ANION GAP SERPL CALCULATED.3IONS-SCNC: 11 MMOL/L (ref 5–18)
BUN SERPL-MCNC: 20 MG/DL (ref 8–28)
CALCIUM SERPL-MCNC: 8.7 MG/DL (ref 8.5–10.5)
CHLORIDE BLD-SCNC: 106 MMOL/L (ref 98–107)
CO2 SERPL-SCNC: 25 MMOL/L (ref 22–31)
CREAT SERPL-MCNC: 1.13 MG/DL (ref 0.6–1.1)
GFR SERPL CREATININE-BSD FRML MDRD: 47 ML/MIN/1.73M2
GLUCOSE BLD-MCNC: 91 MG/DL (ref 70–125)
POTASSIUM BLD-SCNC: 3.4 MMOL/L (ref 3.5–5)
SODIUM SERPL-SCNC: 142 MMOL/L (ref 136–145)

## 2019-03-11 ENCOUNTER — COMMUNICATION - HEALTHEAST (OUTPATIENT)
Dept: RESPIRATORY THERAPY | Facility: CLINIC | Age: 74
End: 2019-03-11

## 2019-03-12 ENCOUNTER — COMMUNICATION - HEALTHEAST (OUTPATIENT)
Dept: FAMILY MEDICINE | Facility: CLINIC | Age: 74
End: 2019-03-12

## 2019-03-14 ENCOUNTER — AMBULATORY - HEALTHEAST (OUTPATIENT)
Dept: FAMILY MEDICINE | Facility: CLINIC | Age: 74
End: 2019-03-14

## 2019-03-18 ENCOUNTER — COMMUNICATION - HEALTHEAST (OUTPATIENT)
Dept: RESPIRATORY THERAPY | Facility: CLINIC | Age: 74
End: 2019-03-18

## 2019-03-25 ENCOUNTER — RECORDS - HEALTHEAST (OUTPATIENT)
Dept: ADMINISTRATIVE | Facility: OTHER | Age: 74
End: 2019-03-25

## 2019-03-25 ENCOUNTER — RECORDS - HEALTHEAST (OUTPATIENT)
Dept: BONE DENSITY | Facility: CLINIC | Age: 74
End: 2019-03-25

## 2019-03-25 DIAGNOSIS — M81.0 AGE-RELATED OSTEOPOROSIS WITHOUT CURRENT PATHOLOGICAL FRACTURE: ICD-10-CM

## 2019-03-27 ENCOUNTER — COMMUNICATION - HEALTHEAST (OUTPATIENT)
Dept: FAMILY MEDICINE | Facility: CLINIC | Age: 74
End: 2019-03-27

## 2019-03-27 DIAGNOSIS — B02.29 POSTHERPETIC NEURALGIA: ICD-10-CM

## 2019-03-29 ENCOUNTER — COMMUNICATION - HEALTHEAST (OUTPATIENT)
Dept: FAMILY MEDICINE | Facility: CLINIC | Age: 74
End: 2019-03-29

## 2019-03-29 DIAGNOSIS — M81.0 OSTEOPOROSIS, UNSPECIFIED OSTEOPOROSIS TYPE, UNSPECIFIED PATHOLOGICAL FRACTURE PRESENCE: ICD-10-CM

## 2019-04-03 ENCOUNTER — COMMUNICATION - HEALTHEAST (OUTPATIENT)
Dept: RESPIRATORY THERAPY | Facility: CLINIC | Age: 74
End: 2019-04-03

## 2019-04-07 ENCOUNTER — COMMUNICATION - HEALTHEAST (OUTPATIENT)
Dept: FAMILY MEDICINE | Facility: CLINIC | Age: 74
End: 2019-04-07

## 2019-04-07 DIAGNOSIS — F51.01 PRIMARY INSOMNIA: ICD-10-CM

## 2019-04-15 ENCOUNTER — COMMUNICATION - HEALTHEAST (OUTPATIENT)
Dept: ADMINISTRATIVE | Facility: CLINIC | Age: 74
End: 2019-04-15

## 2019-04-17 ENCOUNTER — OFFICE VISIT - HEALTHEAST (OUTPATIENT)
Dept: CARDIOLOGY | Facility: CLINIC | Age: 74
End: 2019-04-17

## 2019-04-17 DIAGNOSIS — I42.8 NONISCHEMIC CARDIOMYOPATHY (H): ICD-10-CM

## 2019-04-17 DIAGNOSIS — N18.30 CKD (CHRONIC KIDNEY DISEASE) STAGE 3, GFR 30-59 ML/MIN (H): ICD-10-CM

## 2019-04-17 DIAGNOSIS — I50.22 CHRONIC SYSTOLIC HEART FAILURE (H): ICD-10-CM

## 2019-04-17 DIAGNOSIS — I10 ESSENTIAL HYPERTENSION: ICD-10-CM

## 2019-04-17 DIAGNOSIS — J43.2 CENTRILOBULAR EMPHYSEMA (H): ICD-10-CM

## 2019-04-17 LAB
ANION GAP SERPL CALCULATED.3IONS-SCNC: 8 MMOL/L (ref 5–18)
BNP SERPL-MCNC: 644 PG/ML (ref 0–133)
BUN SERPL-MCNC: 15 MG/DL (ref 8–28)
CALCIUM SERPL-MCNC: 10 MG/DL (ref 8.5–10.5)
CHLORIDE BLD-SCNC: 108 MMOL/L (ref 98–107)
CO2 SERPL-SCNC: 25 MMOL/L (ref 22–31)
CREAT SERPL-MCNC: 1.17 MG/DL (ref 0.6–1.1)
GFR SERPL CREATININE-BSD FRML MDRD: 45 ML/MIN/1.73M2
GLUCOSE BLD-MCNC: 68 MG/DL (ref 70–125)
POTASSIUM BLD-SCNC: 4.2 MMOL/L (ref 3.5–5)
SODIUM SERPL-SCNC: 141 MMOL/L (ref 136–145)

## 2019-04-17 ASSESSMENT — MIFFLIN-ST. JEOR: SCORE: 1128.75

## 2019-05-01 ENCOUNTER — COMMUNICATION - HEALTHEAST (OUTPATIENT)
Dept: RESPIRATORY THERAPY | Facility: CLINIC | Age: 74
End: 2019-05-01

## 2019-05-13 ENCOUNTER — HOSPITAL ENCOUNTER (OUTPATIENT)
Dept: MAMMOGRAPHY | Facility: CLINIC | Age: 74
Discharge: HOME | End: 2019-05-13
Attending: INTERNAL MEDICINE

## 2019-05-13 ENCOUNTER — HOSPITAL ENCOUNTER (OUTPATIENT)
Dept: ULTRASOUND IMAGING | Facility: CLINIC | Age: 74
Discharge: HOME | End: 2019-05-13
Attending: INTERNAL MEDICINE

## 2019-05-13 ENCOUNTER — RECORDS - HEALTHEAST (OUTPATIENT)
Dept: ADMINISTRATIVE | Facility: OTHER | Age: 74
End: 2019-05-13

## 2019-05-13 DIAGNOSIS — Z12.31 VISIT FOR SCREENING MAMMOGRAM: ICD-10-CM

## 2019-05-13 DIAGNOSIS — Z09 FOLLOW-UP EXAM, 3-6 MONTHS SINCE PREVIOUS EXAM: ICD-10-CM

## 2019-05-13 DIAGNOSIS — Z17.0 MALIGNANT NEOPLASM OF CENTRAL PORTION OF LEFT BREAST IN FEMALE, ESTROGEN RECEPTOR POSITIVE (H): ICD-10-CM

## 2019-05-13 DIAGNOSIS — C50.112 MALIGNANT NEOPLASM OF CENTRAL PORTION OF LEFT BREAST IN FEMALE, ESTROGEN RECEPTOR POSITIVE (H): ICD-10-CM

## 2019-05-14 ENCOUNTER — OFFICE VISIT - HEALTHEAST (OUTPATIENT)
Dept: CARDIOLOGY | Facility: CLINIC | Age: 74
End: 2019-05-14

## 2019-05-14 DIAGNOSIS — I10 ESSENTIAL HYPERTENSION: ICD-10-CM

## 2019-05-14 DIAGNOSIS — I42.9 CARDIOMYOPATHY, IDIOPATHIC (H): ICD-10-CM

## 2019-05-14 ASSESSMENT — MIFFLIN-ST. JEOR: SCORE: 1133.28

## 2019-06-05 ENCOUNTER — COMMUNICATION - HEALTHEAST (OUTPATIENT)
Dept: RESPIRATORY THERAPY | Facility: CLINIC | Age: 74
End: 2019-06-05

## 2019-06-11 ENCOUNTER — AMBULATORY - HEALTHEAST (OUTPATIENT)
Dept: INFUSION THERAPY | Facility: CLINIC | Age: 74
End: 2019-06-11

## 2019-06-11 ENCOUNTER — HOSPITAL ENCOUNTER (OUTPATIENT)
Dept: CT IMAGING | Facility: CLINIC | Age: 74
Setting detail: RADIATION/ONCOLOGY SERIES
Discharge: STILL ADMITTED OR ADMINISTRATIVE | End: 2019-06-11
Attending: INTERNAL MEDICINE

## 2019-06-11 DIAGNOSIS — Z17.0 MALIGNANT NEOPLASM OF CENTRAL PORTION OF LEFT BREAST IN FEMALE, ESTROGEN RECEPTOR POSITIVE (H): ICD-10-CM

## 2019-06-11 DIAGNOSIS — C50.112 MALIGNANT NEOPLASM OF CENTRAL PORTION OF LEFT BREAST IN FEMALE, ESTROGEN RECEPTOR POSITIVE (H): ICD-10-CM

## 2019-06-11 DIAGNOSIS — R91.8 MULTIPLE LUNG NODULES ON CT: ICD-10-CM

## 2019-06-11 LAB
ALBUMIN SERPL-MCNC: 3.3 G/DL (ref 3.5–5)
ALP SERPL-CCNC: 53 U/L (ref 45–120)
ALT SERPL W P-5'-P-CCNC: 24 U/L (ref 0–45)
ANION GAP SERPL CALCULATED.3IONS-SCNC: 11 MMOL/L (ref 5–18)
AST SERPL W P-5'-P-CCNC: 30 U/L (ref 0–40)
BASOPHILS # BLD AUTO: 0 THOU/UL (ref 0–0.2)
BASOPHILS NFR BLD AUTO: 0 % (ref 0–2)
BILIRUB SERPL-MCNC: 0.2 MG/DL (ref 0–1)
BUN SERPL-MCNC: 22 MG/DL (ref 8–28)
CALCIUM SERPL-MCNC: 9.5 MG/DL (ref 8.5–10.5)
CHLORIDE BLD-SCNC: 107 MMOL/L (ref 98–107)
CO2 SERPL-SCNC: 24 MMOL/L (ref 22–31)
CREAT SERPL-MCNC: 1.33 MG/DL (ref 0.6–1.1)
EOSINOPHIL # BLD AUTO: 0.1 THOU/UL (ref 0–0.4)
EOSINOPHIL NFR BLD AUTO: 1 % (ref 0–6)
ERYTHROCYTE [DISTWIDTH] IN BLOOD BY AUTOMATED COUNT: 13.9 % (ref 11–14.5)
GFR SERPL CREATININE-BSD FRML MDRD: 39 ML/MIN/1.73M2
GLUCOSE BLD-MCNC: 83 MG/DL (ref 70–125)
HCT VFR BLD AUTO: 42.9 % (ref 35–47)
HGB BLD-MCNC: 13.8 G/DL (ref 12–16)
LYMPHOCYTES # BLD AUTO: 1.6 THOU/UL (ref 0.8–4.4)
LYMPHOCYTES NFR BLD AUTO: 16 % (ref 20–40)
MCH RBC QN AUTO: 28.9 PG (ref 27–34)
MCHC RBC AUTO-ENTMCNC: 32.2 G/DL (ref 32–36)
MCV RBC AUTO: 90 FL (ref 80–100)
MONOCYTES # BLD AUTO: 1 THOU/UL (ref 0–0.9)
MONOCYTES NFR BLD AUTO: 10 % (ref 2–10)
NEUTROPHILS # BLD AUTO: 7 THOU/UL (ref 2–7.7)
NEUTROPHILS NFR BLD AUTO: 72 % (ref 50–70)
PLATELET # BLD AUTO: 213 THOU/UL (ref 140–440)
PMV BLD AUTO: 9 FL (ref 8.5–12.5)
POTASSIUM BLD-SCNC: 4.1 MMOL/L (ref 3.5–5)
PROT SERPL-MCNC: 6.5 G/DL (ref 6–8)
RBC # BLD AUTO: 4.78 MILL/UL (ref 3.8–5.4)
SODIUM SERPL-SCNC: 142 MMOL/L (ref 136–145)
WBC: 9.9 THOU/UL (ref 4–11)

## 2019-06-13 ENCOUNTER — OFFICE VISIT - HEALTHEAST (OUTPATIENT)
Dept: ONCOLOGY | Facility: CLINIC | Age: 74
End: 2019-06-13

## 2019-06-13 DIAGNOSIS — R22.2 PLEURAL NODULES: ICD-10-CM

## 2019-06-13 DIAGNOSIS — Z17.0 MALIGNANT NEOPLASM OF CENTRAL PORTION OF LEFT BREAST IN FEMALE, ESTROGEN RECEPTOR POSITIVE (H): ICD-10-CM

## 2019-06-13 DIAGNOSIS — C50.112 MALIGNANT NEOPLASM OF CENTRAL PORTION OF LEFT BREAST IN FEMALE, ESTROGEN RECEPTOR POSITIVE (H): ICD-10-CM

## 2019-06-18 ENCOUNTER — COMMUNICATION - HEALTHEAST (OUTPATIENT)
Dept: FAMILY MEDICINE | Facility: CLINIC | Age: 74
End: 2019-06-18

## 2019-06-18 DIAGNOSIS — R32 URINARY INCONTINENCE, UNSPECIFIED TYPE: ICD-10-CM

## 2019-06-18 DIAGNOSIS — F51.01 PRIMARY INSOMNIA: ICD-10-CM

## 2019-07-03 ENCOUNTER — COMMUNICATION - HEALTHEAST (OUTPATIENT)
Dept: RESPIRATORY THERAPY | Facility: CLINIC | Age: 74
End: 2019-07-03

## 2019-07-10 ENCOUNTER — OFFICE VISIT - HEALTHEAST (OUTPATIENT)
Dept: INTERNAL MEDICINE | Facility: CLINIC | Age: 74
End: 2019-07-10

## 2019-07-10 DIAGNOSIS — Z96.642 S/P HIP REPLACEMENT, LEFT: ICD-10-CM

## 2019-07-10 DIAGNOSIS — D68.51 HETEROZYGOUS FACTOR V LEIDEN MUTATION (H): ICD-10-CM

## 2019-07-10 DIAGNOSIS — M81.0 OSTEOPOROSIS WITHOUT CURRENT PATHOLOGICAL FRACTURE, UNSPECIFIED OSTEOPOROSIS TYPE: ICD-10-CM

## 2019-07-10 DIAGNOSIS — R91.8 PULMONARY NODULES: ICD-10-CM

## 2019-07-10 DIAGNOSIS — M25.552 HIP PAIN, LEFT: ICD-10-CM

## 2019-07-10 DIAGNOSIS — J43.9 PULMONARY EMPHYSEMA, UNSPECIFIED EMPHYSEMA TYPE (H): ICD-10-CM

## 2019-07-10 DIAGNOSIS — Z85.3 HISTORY OF INVASIVE BREAST CANCER: ICD-10-CM

## 2019-07-10 DIAGNOSIS — Z86.73 HISTORY OF STROKE: ICD-10-CM

## 2019-07-10 DIAGNOSIS — I42.9 CARDIOMYOPATHY, IDIOPATHIC (H): ICD-10-CM

## 2019-07-10 ASSESSMENT — MIFFLIN-ST. JEOR: SCORE: 1124.5

## 2019-07-15 ENCOUNTER — RECORDS - HEALTHEAST (OUTPATIENT)
Dept: ADMINISTRATIVE | Facility: OTHER | Age: 74
End: 2019-07-15

## 2019-07-24 ENCOUNTER — COMMUNICATION - HEALTHEAST (OUTPATIENT)
Dept: CARDIOLOGY | Facility: CLINIC | Age: 74
End: 2019-07-24

## 2019-07-30 ENCOUNTER — OFFICE VISIT - HEALTHEAST (OUTPATIENT)
Dept: INTERNAL MEDICINE | Facility: CLINIC | Age: 74
End: 2019-07-30

## 2019-07-30 DIAGNOSIS — I10 ESSENTIAL HYPERTENSION: ICD-10-CM

## 2019-07-30 DIAGNOSIS — R05.9 COUGH: ICD-10-CM

## 2019-07-31 ENCOUNTER — COMMUNICATION - HEALTHEAST (OUTPATIENT)
Dept: SCHEDULING | Facility: CLINIC | Age: 74
End: 2019-07-31

## 2019-08-02 ENCOUNTER — COMMUNICATION - HEALTHEAST (OUTPATIENT)
Dept: INTERNAL MEDICINE | Facility: CLINIC | Age: 74
End: 2019-08-02

## 2019-08-05 ENCOUNTER — COMMUNICATION - HEALTHEAST (OUTPATIENT)
Dept: RESPIRATORY THERAPY | Facility: CLINIC | Age: 74
End: 2019-08-05

## 2019-08-19 ENCOUNTER — OFFICE VISIT - HEALTHEAST (OUTPATIENT)
Dept: INTERNAL MEDICINE | Facility: CLINIC | Age: 74
End: 2019-08-19

## 2019-08-19 DIAGNOSIS — I10 ESSENTIAL HYPERTENSION: ICD-10-CM

## 2019-08-19 DIAGNOSIS — R05.9 COUGH: ICD-10-CM

## 2019-08-19 DIAGNOSIS — J44.1 COPD EXACERBATION (H): ICD-10-CM

## 2019-08-19 DIAGNOSIS — J43.2 CENTRILOBULAR EMPHYSEMA (H): ICD-10-CM

## 2019-08-19 LAB
ANION GAP SERPL CALCULATED.3IONS-SCNC: 8 MMOL/L (ref 5–18)
BUN SERPL-MCNC: 11 MG/DL (ref 8–28)
CALCIUM SERPL-MCNC: 9.3 MG/DL (ref 8.5–10.5)
CHLORIDE BLD-SCNC: 107 MMOL/L (ref 98–107)
CO2 SERPL-SCNC: 27 MMOL/L (ref 22–31)
CREAT SERPL-MCNC: 1.19 MG/DL (ref 0.6–1.1)
GFR SERPL CREATININE-BSD FRML MDRD: 44 ML/MIN/1.73M2
GLUCOSE BLD-MCNC: 96 MG/DL (ref 70–125)
POTASSIUM BLD-SCNC: 4.1 MMOL/L (ref 3.5–5)
SODIUM SERPL-SCNC: 142 MMOL/L (ref 136–145)

## 2019-08-20 ENCOUNTER — AMBULATORY - HEALTHEAST (OUTPATIENT)
Dept: PULMONOLOGY | Facility: OTHER | Age: 74
End: 2019-08-20

## 2019-08-20 DIAGNOSIS — J44.9 COPD (CHRONIC OBSTRUCTIVE PULMONARY DISEASE) (H): ICD-10-CM

## 2019-08-24 ENCOUNTER — COMMUNICATION - HEALTHEAST (OUTPATIENT)
Dept: FAMILY MEDICINE | Facility: CLINIC | Age: 74
End: 2019-08-24

## 2019-08-24 DIAGNOSIS — R32 URINARY INCONTINENCE, UNSPECIFIED TYPE: ICD-10-CM

## 2019-09-06 ENCOUNTER — COMMUNICATION - HEALTHEAST (OUTPATIENT)
Dept: RESPIRATORY THERAPY | Facility: CLINIC | Age: 74
End: 2019-09-06

## 2019-09-12 ENCOUNTER — RECORDS - HEALTHEAST (OUTPATIENT)
Dept: ADMINISTRATIVE | Facility: OTHER | Age: 74
End: 2019-09-12

## 2019-09-13 ENCOUNTER — RECORDS - HEALTHEAST (OUTPATIENT)
Dept: ADMINISTRATIVE | Facility: OTHER | Age: 74
End: 2019-09-13

## 2019-09-16 ENCOUNTER — COMMUNICATION - HEALTHEAST (OUTPATIENT)
Dept: FAMILY MEDICINE | Facility: CLINIC | Age: 74
End: 2019-09-16

## 2019-09-16 DIAGNOSIS — F51.01 PRIMARY INSOMNIA: ICD-10-CM

## 2019-09-18 ENCOUNTER — COMMUNICATION - HEALTHEAST (OUTPATIENT)
Dept: INTERNAL MEDICINE | Facility: CLINIC | Age: 74
End: 2019-09-18

## 2019-09-18 DIAGNOSIS — N32.81 OVERACTIVE BLADDER: ICD-10-CM

## 2019-10-04 ENCOUNTER — HOSPITAL ENCOUNTER (OUTPATIENT)
Dept: RESPIRATORY THERAPY | Facility: HOSPITAL | Age: 74
Discharge: HOME | End: 2019-10-04

## 2019-10-04 ENCOUNTER — COMMUNICATION - HEALTHEAST (OUTPATIENT)
Dept: SCHEDULING | Facility: CLINIC | Age: 74
End: 2019-10-04

## 2019-10-04 DIAGNOSIS — J44.9 COPD (CHRONIC OBSTRUCTIVE PULMONARY DISEASE) (H): ICD-10-CM

## 2019-10-04 LAB — HGB BLD-MCNC: 13.2 G/DL (ref 12–16)

## 2019-10-05 ENCOUNTER — COMMUNICATION - HEALTHEAST (OUTPATIENT)
Dept: RESPIRATORY THERAPY | Facility: CLINIC | Age: 74
End: 2019-10-05

## 2019-10-14 ENCOUNTER — COMMUNICATION - HEALTHEAST (OUTPATIENT)
Dept: ONCOLOGY | Facility: CLINIC | Age: 74
End: 2019-10-14

## 2019-10-14 DIAGNOSIS — N95.1 MENOPAUSAL SYNDROME (HOT FLASHES): ICD-10-CM

## 2019-10-15 ENCOUNTER — AMBULATORY - HEALTHEAST (OUTPATIENT)
Dept: LAB | Facility: CLINIC | Age: 74
End: 2019-10-15

## 2019-10-15 DIAGNOSIS — M81.0 OSTEOPOROSIS: ICD-10-CM

## 2019-10-15 LAB
ALP SERPL-CCNC: 52 U/L (ref 45–120)
CALCIUM SERPL-MCNC: 9.3 MG/DL (ref 8.5–10.5)
PTH-INTACT SERPL-MCNC: 71 PG/ML (ref 10–86)

## 2019-10-16 ENCOUNTER — COMMUNICATION - HEALTHEAST (OUTPATIENT)
Dept: INTERNAL MEDICINE | Facility: CLINIC | Age: 74
End: 2019-10-16

## 2019-10-16 LAB — 25(OH)D3 SERPL-MCNC: 47.6 NG/ML (ref 30–80)

## 2019-10-30 ENCOUNTER — OFFICE VISIT - HEALTHEAST (OUTPATIENT)
Dept: INTERNAL MEDICINE | Facility: CLINIC | Age: 74
End: 2019-10-30

## 2019-10-30 ENCOUNTER — COMMUNICATION - HEALTHEAST (OUTPATIENT)
Dept: LAB | Facility: CLINIC | Age: 74
End: 2019-10-30

## 2019-10-30 DIAGNOSIS — I10 ESSENTIAL HYPERTENSION: ICD-10-CM

## 2019-10-30 DIAGNOSIS — J43.9 PULMONARY EMPHYSEMA, UNSPECIFIED EMPHYSEMA TYPE (H): ICD-10-CM

## 2019-10-30 DIAGNOSIS — M16.12 OSTEOARTHRITIS OF LEFT HIP, UNSPECIFIED OSTEOARTHRITIS TYPE: ICD-10-CM

## 2019-10-31 ENCOUNTER — COMMUNICATION - HEALTHEAST (OUTPATIENT)
Dept: SCHEDULING | Facility: CLINIC | Age: 74
End: 2019-10-31

## 2019-10-31 DIAGNOSIS — J43.9 PULMONARY EMPHYSEMA, UNSPECIFIED EMPHYSEMA TYPE (H): ICD-10-CM

## 2019-11-01 ENCOUNTER — OFFICE VISIT - HEALTHEAST (OUTPATIENT)
Dept: CARDIOLOGY | Facility: CLINIC | Age: 74
End: 2019-11-01

## 2019-11-01 DIAGNOSIS — J43.2 CENTRILOBULAR EMPHYSEMA (H): ICD-10-CM

## 2019-11-01 DIAGNOSIS — I49.3 PVC'S (PREMATURE VENTRICULAR CONTRACTIONS): ICD-10-CM

## 2019-11-01 DIAGNOSIS — R00.1 SINUS BRADYCARDIA: ICD-10-CM

## 2019-11-01 DIAGNOSIS — I42.8 NONISCHEMIC CARDIOMYOPATHY (H): ICD-10-CM

## 2019-11-01 DIAGNOSIS — R06.00 DYSPNEA: ICD-10-CM

## 2019-11-01 LAB
ANION GAP SERPL CALCULATED.3IONS-SCNC: 10 MMOL/L (ref 5–18)
ATRIAL RATE - MUSE: 52 BPM
BNP SERPL-MCNC: 1019 PG/ML (ref 0–133)
BUN SERPL-MCNC: 11 MG/DL (ref 8–28)
CALCIUM SERPL-MCNC: 9.1 MG/DL (ref 8.5–10.5)
CHLORIDE BLD-SCNC: 108 MMOL/L (ref 98–107)
CO2 SERPL-SCNC: 21 MMOL/L (ref 22–31)
CREAT SERPL-MCNC: 1.44 MG/DL (ref 0.6–1.1)
DIASTOLIC BLOOD PRESSURE - MUSE: NORMAL
GFR SERPL CREATININE-BSD FRML MDRD: 36 ML/MIN/1.73M2
GLUCOSE BLD-MCNC: 88 MG/DL (ref 70–125)
INTERPRETATION ECG - MUSE: NORMAL
P AXIS - MUSE: 64 DEGREES
POTASSIUM BLD-SCNC: 4.4 MMOL/L (ref 3.5–5)
PR INTERVAL - MUSE: 138 MS
QRS DURATION - MUSE: 128 MS
QT - MUSE: 540 MS
QTC - MUSE: 502 MS
R AXIS - MUSE: -60 DEGREES
SODIUM SERPL-SCNC: 139 MMOL/L (ref 136–145)
SYSTOLIC BLOOD PRESSURE - MUSE: NORMAL
T AXIS - MUSE: 113 DEGREES
VENTRICULAR RATE- MUSE: 52 BPM

## 2019-11-01 ASSESSMENT — MIFFLIN-ST. JEOR: SCORE: 1137.25

## 2019-11-04 ENCOUNTER — COMMUNICATION - HEALTHEAST (OUTPATIENT)
Dept: RESPIRATORY THERAPY | Facility: CLINIC | Age: 74
End: 2019-11-04

## 2019-11-18 ENCOUNTER — HOSPITAL ENCOUNTER (OUTPATIENT)
Dept: CARDIOLOGY | Facility: CLINIC | Age: 74
Discharge: HOME | End: 2019-11-18
Attending: INTERNAL MEDICINE

## 2019-11-18 DIAGNOSIS — R06.00 DYSPNEA: ICD-10-CM

## 2019-11-18 DIAGNOSIS — I42.8 NONISCHEMIC CARDIOMYOPATHY (H): ICD-10-CM

## 2019-11-18 ASSESSMENT — MIFFLIN-ST. JEOR: SCORE: 1137.25

## 2019-11-19 LAB
AORTIC ROOT: 3.3 CM
BSA FOR ECHO PROCEDURE: 1.74 M2
CV BLOOD PRESSURE: ABNORMAL MMHG
CV ECHO HEIGHT: 62 IN
CV ECHO WEIGHT: 153 LBS
DOP CALC LVOT AREA: 2.54 CM2
DOP CALC LVOT DIAMETER: 1.8 CM
DOP CALC LVOT PEAK VEL: 68.8 CM/S
DOP CALC LVOT STROKE VOLUME: 38.4 CM3
DOP CALCLVOT PEAK VEL VTI: 15.1 CM
EJECTION FRACTION: 39 % (ref 55–75)
LA AREA 1: 18.9 CM2
LA AREA 2: 22.9 CM2
LEFT ATRIUM LENGTH: 5.62 CM
LEFT ATRIUM SIZE: 3.1 CM
LEFT ATRIUM TO AORTIC ROOT RATIO: 0.94 NO UNITS
LEFT ATRIUM VOLUME INDEX: 37.6 ML/M2
LEFT ATRIUM VOLUME: 65.5 ML
LEFT VENTRICLE CARDIAC INDEX: 1.5 L/MIN/M2
LEFT VENTRICLE CARDIAC OUTPUT: 2.6 L/MIN
LEFT VENTRICLE DIASTOLIC VOLUME INDEX: 58.6 CM3/M2 (ref 29–61)
LEFT VENTRICLE DIASTOLIC VOLUME: 102 CM3 (ref 46–106)
LEFT VENTRICLE HEART RATE: 69 BPM
LEFT VENTRICLE SYSTOLIC VOLUME INDEX: 35.6 CM3/M2 (ref 8–24)
LEFT VENTRICLE SYSTOLIC VOLUME: 62 CM3 (ref 14–42)
LEFT VENTRICULAR OUTFLOW TRACT MEAN GRADIENT: 1 MMHG
LEFT VENTRICULAR OUTFLOW TRACT MEAN VELOCITY: 43.7 CM/S
LEFT VENTRICULAR OUTFLOW TRACT PEAK GRADIENT: 2 MMHG
LV STROKE VOLUME INDEX: 22.1 ML/M2
MITRAL VALVE E/A RATIO: 0.5
MV AVERAGE E/E' RATIO: 15.2 CM/S
MV DECELERATION TIME: 120 MS
MV E'TISSUE VEL-LAT: 4.48 CM/S
MV E'TISSUE VEL-MED: 3.31 CM/S
MV LATERAL E/E' RATIO: 13.2
MV MEDIAL E/E' RATIO: 17.9
MV PEAK A VELOCITY: 124 CM/S
MV PEAK E VELOCITY: 59.2 CM/S
NUC REST DIASTOLIC VOLUME INDEX: 2448 LBS
NUC REST SYSTOLIC VOLUME INDEX: 62 IN
TRICUSPID VALVE ANULAR PLANE SYSTOLIC EXCURSION: 2 CM

## 2019-11-20 ENCOUNTER — AMBULATORY - HEALTHEAST (OUTPATIENT)
Dept: LAB | Facility: CLINIC | Age: 74
End: 2019-11-20

## 2019-11-20 ENCOUNTER — COMMUNICATION - HEALTHEAST (OUTPATIENT)
Dept: INTERNAL MEDICINE | Facility: CLINIC | Age: 74
End: 2019-11-20

## 2019-11-20 DIAGNOSIS — I10 ESSENTIAL HYPERTENSION: ICD-10-CM

## 2019-11-20 LAB
ANION GAP SERPL CALCULATED.3IONS-SCNC: 14 MMOL/L (ref 5–18)
BUN SERPL-MCNC: 13 MG/DL (ref 8–28)
CALCIUM SERPL-MCNC: 9.9 MG/DL (ref 8.5–10.5)
CHLORIDE BLD-SCNC: 107 MMOL/L (ref 98–107)
CO2 SERPL-SCNC: 22 MMOL/L (ref 22–31)
CREAT SERPL-MCNC: 1.23 MG/DL (ref 0.6–1.1)
GFR SERPL CREATININE-BSD FRML MDRD: 43 ML/MIN/1.73M2
GLUCOSE BLD-MCNC: 90 MG/DL (ref 70–125)
POTASSIUM BLD-SCNC: 5.1 MMOL/L (ref 3.5–5)
SODIUM SERPL-SCNC: 143 MMOL/L (ref 136–145)

## 2019-11-26 ENCOUNTER — OFFICE VISIT - HEALTHEAST (OUTPATIENT)
Dept: CARDIOLOGY | Facility: CLINIC | Age: 74
End: 2019-11-26

## 2019-11-26 ENCOUNTER — COMMUNICATION - HEALTHEAST (OUTPATIENT)
Dept: CARDIOLOGY | Facility: CLINIC | Age: 74
End: 2019-11-26

## 2019-11-26 DIAGNOSIS — I42.9 CARDIOMYOPATHY, IDIOPATHIC (H): ICD-10-CM

## 2019-11-26 DIAGNOSIS — I10 ESSENTIAL HYPERTENSION: ICD-10-CM

## 2019-11-26 DIAGNOSIS — R32 URINARY INCONTINENCE, UNSPECIFIED TYPE: ICD-10-CM

## 2019-11-26 ASSESSMENT — MIFFLIN-ST. JEOR: SCORE: 1123.65

## 2019-12-06 ENCOUNTER — COMMUNICATION - HEALTHEAST (OUTPATIENT)
Dept: RESPIRATORY THERAPY | Facility: CLINIC | Age: 74
End: 2019-12-06

## 2019-12-12 ENCOUNTER — COMMUNICATION - HEALTHEAST (OUTPATIENT)
Dept: ONCOLOGY | Facility: CLINIC | Age: 74
End: 2019-12-12

## 2019-12-12 DIAGNOSIS — Z17.0 MALIGNANT NEOPLASM OF CENTRAL PORTION OF LEFT BREAST IN FEMALE, ESTROGEN RECEPTOR POSITIVE (H): ICD-10-CM

## 2019-12-12 DIAGNOSIS — C50.112 MALIGNANT NEOPLASM OF CENTRAL PORTION OF LEFT BREAST IN FEMALE, ESTROGEN RECEPTOR POSITIVE (H): ICD-10-CM

## 2019-12-21 ENCOUNTER — COMMUNICATION - HEALTHEAST (OUTPATIENT)
Dept: ONCOLOGY | Facility: CLINIC | Age: 74
End: 2019-12-21

## 2019-12-23 ENCOUNTER — COMMUNICATION - HEALTHEAST (OUTPATIENT)
Dept: ONCOLOGY | Facility: CLINIC | Age: 74
End: 2019-12-23

## 2019-12-24 ENCOUNTER — RECORDS - HEALTHEAST (OUTPATIENT)
Dept: ADMINISTRATIVE | Facility: OTHER | Age: 74
End: 2019-12-24

## 2020-01-02 ENCOUNTER — COMMUNICATION - HEALTHEAST (OUTPATIENT)
Dept: RESPIRATORY THERAPY | Facility: CLINIC | Age: 75
End: 2020-01-02

## 2020-01-02 ENCOUNTER — OFFICE VISIT - HEALTHEAST (OUTPATIENT)
Dept: INTERNAL MEDICINE | Facility: CLINIC | Age: 75
End: 2020-01-02

## 2020-01-02 ENCOUNTER — COMMUNICATION - HEALTHEAST (OUTPATIENT)
Dept: INTERNAL MEDICINE | Facility: CLINIC | Age: 75
End: 2020-01-02

## 2020-01-02 DIAGNOSIS — R05.9 COUGH: ICD-10-CM

## 2020-01-02 DIAGNOSIS — Z85.3 HISTORY OF INVASIVE BREAST CANCER: ICD-10-CM

## 2020-01-02 DIAGNOSIS — I10 ESSENTIAL HYPERTENSION: ICD-10-CM

## 2020-01-02 DIAGNOSIS — Z51.81 ENCOUNTER FOR THERAPEUTIC DRUG MONITORING: ICD-10-CM

## 2020-01-02 DIAGNOSIS — R12 HEARTBURN: ICD-10-CM

## 2020-01-02 DIAGNOSIS — Z96.642 S/P HIP REPLACEMENT, LEFT: ICD-10-CM

## 2020-01-02 DIAGNOSIS — Z86.73 HISTORY OF STROKE: ICD-10-CM

## 2020-01-02 DIAGNOSIS — I42.9 CARDIOMYOPATHY, IDIOPATHIC (H): ICD-10-CM

## 2020-01-02 DIAGNOSIS — M81.0 OSTEOPOROSIS WITHOUT CURRENT PATHOLOGICAL FRACTURE, UNSPECIFIED OSTEOPOROSIS TYPE: ICD-10-CM

## 2020-01-02 DIAGNOSIS — M25.552 HIP PAIN, LEFT: ICD-10-CM

## 2020-01-02 DIAGNOSIS — J43.9 PULMONARY EMPHYSEMA, UNSPECIFIED EMPHYSEMA TYPE (H): ICD-10-CM

## 2020-01-02 LAB
ANION GAP SERPL CALCULATED.3IONS-SCNC: 14 MMOL/L (ref 5–18)
BUN SERPL-MCNC: 13 MG/DL (ref 8–28)
CALCIUM SERPL-MCNC: 9.5 MG/DL (ref 8.5–10.5)
CHLORIDE BLD-SCNC: 104 MMOL/L (ref 98–107)
CO2 SERPL-SCNC: 25 MMOL/L (ref 22–31)
CREAT SERPL-MCNC: 1.34 MG/DL (ref 0.6–1.1)
GFR SERPL CREATININE-BSD FRML MDRD: 39 ML/MIN/1.73M2
GLUCOSE BLD-MCNC: 85 MG/DL (ref 70–125)
POTASSIUM BLD-SCNC: 3.8 MMOL/L (ref 3.5–5)
SODIUM SERPL-SCNC: 143 MMOL/L (ref 136–145)

## 2020-01-02 ASSESSMENT — PATIENT HEALTH QUESTIONNAIRE - PHQ9: SUM OF ALL RESPONSES TO PHQ QUESTIONS 1-9: 0

## 2020-01-03 ENCOUNTER — COMMUNICATION - HEALTHEAST (OUTPATIENT)
Dept: INTERNAL MEDICINE | Facility: CLINIC | Age: 75
End: 2020-01-03

## 2020-01-07 ENCOUNTER — HOSPITAL ENCOUNTER (OUTPATIENT)
Dept: PALLIATIVE MEDICINE | Facility: OTHER | Age: 75
Discharge: HOME | End: 2020-01-07
Attending: ANESTHESIOLOGY

## 2020-01-07 ENCOUNTER — COMMUNICATION - HEALTHEAST (OUTPATIENT)
Dept: PALLIATIVE MEDICINE | Facility: OTHER | Age: 75
End: 2020-01-07

## 2020-01-07 DIAGNOSIS — G89.4 CHRONIC PAIN SYNDROME: ICD-10-CM

## 2020-01-07 DIAGNOSIS — M25.552 HIP PAIN, LEFT: ICD-10-CM

## 2020-01-07 ASSESSMENT — MIFFLIN-ST. JEOR: SCORE: 1138.16

## 2020-01-09 LAB

## 2020-01-12 ENCOUNTER — COMMUNICATION - HEALTHEAST (OUTPATIENT)
Dept: PALLIATIVE MEDICINE | Facility: OTHER | Age: 75
End: 2020-01-12

## 2020-01-13 ENCOUNTER — COMMUNICATION - HEALTHEAST (OUTPATIENT)
Dept: PALLIATIVE MEDICINE | Facility: OTHER | Age: 75
End: 2020-01-13

## 2020-01-13 DIAGNOSIS — M16.12 OSTEOARTHRITIS OF LEFT HIP, UNSPECIFIED OSTEOARTHRITIS TYPE: ICD-10-CM

## 2020-01-15 ENCOUNTER — OFFICE VISIT - HEALTHEAST (OUTPATIENT)
Dept: INTERNAL MEDICINE | Facility: CLINIC | Age: 75
End: 2020-01-15

## 2020-01-15 DIAGNOSIS — I10 ESSENTIAL HYPERTENSION: ICD-10-CM

## 2020-01-15 DIAGNOSIS — R32 URINARY INCONTINENCE, UNSPECIFIED TYPE: ICD-10-CM

## 2020-01-15 DIAGNOSIS — N32.81 OVERACTIVE BLADDER: ICD-10-CM

## 2020-01-15 DIAGNOSIS — R21 RASH: ICD-10-CM

## 2020-01-15 LAB
ANION GAP SERPL CALCULATED.3IONS-SCNC: 11 MMOL/L (ref 5–18)
BUN SERPL-MCNC: 13 MG/DL (ref 8–28)
CALCIUM SERPL-MCNC: 9.3 MG/DL (ref 8.5–10.5)
CHLORIDE BLD-SCNC: 107 MMOL/L (ref 98–107)
CO2 SERPL-SCNC: 24 MMOL/L (ref 22–31)
CREAT SERPL-MCNC: 1.24 MG/DL (ref 0.6–1.1)
GFR SERPL CREATININE-BSD FRML MDRD: 42 ML/MIN/1.73M2
GLUCOSE BLD-MCNC: 81 MG/DL (ref 70–125)
POTASSIUM BLD-SCNC: 4.1 MMOL/L (ref 3.5–5)
SODIUM SERPL-SCNC: 142 MMOL/L (ref 136–145)

## 2020-01-16 ENCOUNTER — RECORDS - HEALTHEAST (OUTPATIENT)
Dept: ADMINISTRATIVE | Facility: OTHER | Age: 75
End: 2020-01-16

## 2020-01-17 ENCOUNTER — RECORDS - HEALTHEAST (OUTPATIENT)
Dept: ADMINISTRATIVE | Facility: OTHER | Age: 75
End: 2020-01-17

## 2020-01-21 ENCOUNTER — AMBULATORY - HEALTHEAST (OUTPATIENT)
Dept: INTERNAL MEDICINE | Facility: CLINIC | Age: 75
End: 2020-01-21

## 2020-01-21 ENCOUNTER — OFFICE VISIT - HEALTHEAST (OUTPATIENT)
Dept: ONCOLOGY | Facility: CLINIC | Age: 75
End: 2020-01-21

## 2020-01-21 DIAGNOSIS — Z92.29 PERSONAL HISTORY OF OTHER DRUG THERAPY: ICD-10-CM

## 2020-01-21 DIAGNOSIS — Z17.0 MALIGNANT NEOPLASM OF CENTRAL PORTION OF LEFT BREAST IN FEMALE, ESTROGEN RECEPTOR POSITIVE (H): ICD-10-CM

## 2020-01-21 DIAGNOSIS — M81.0 OSTEOPOROSIS, UNSPECIFIED OSTEOPOROSIS TYPE, UNSPECIFIED PATHOLOGICAL FRACTURE PRESENCE: ICD-10-CM

## 2020-01-21 DIAGNOSIS — Z12.31 ENCOUNTER FOR SCREENING MAMMOGRAM FOR BREAST CANCER: ICD-10-CM

## 2020-01-21 DIAGNOSIS — C50.112 MALIGNANT NEOPLASM OF CENTRAL PORTION OF LEFT BREAST IN FEMALE, ESTROGEN RECEPTOR POSITIVE (H): ICD-10-CM

## 2020-01-22 ENCOUNTER — OFFICE VISIT - HEALTHEAST (OUTPATIENT)
Dept: PHYSICAL THERAPY | Facility: REHABILITATION | Age: 75
End: 2020-01-22

## 2020-01-22 DIAGNOSIS — R26.9 ABNORMALITY OF GAIT: ICD-10-CM

## 2020-01-22 DIAGNOSIS — M25.552 CHRONIC LEFT HIP PAIN: ICD-10-CM

## 2020-01-22 DIAGNOSIS — R29.898 WEAKNESS OF LEFT HIP: ICD-10-CM

## 2020-01-22 DIAGNOSIS — M54.50 CHRONIC BILATERAL LOW BACK PAIN WITHOUT SCIATICA: ICD-10-CM

## 2020-01-22 DIAGNOSIS — G89.29 CHRONIC LEFT HIP PAIN: ICD-10-CM

## 2020-01-22 DIAGNOSIS — M62.81 MUSCLE WEAKNESS (GENERALIZED): ICD-10-CM

## 2020-01-22 DIAGNOSIS — G89.29 CHRONIC BILATERAL LOW BACK PAIN WITHOUT SCIATICA: ICD-10-CM

## 2020-01-24 ENCOUNTER — HOSPITAL ENCOUNTER (OUTPATIENT)
Dept: CT IMAGING | Facility: CLINIC | Age: 75
Discharge: HOME | End: 2020-01-24

## 2020-01-24 DIAGNOSIS — J32.4 CHRONIC PANSINUSITIS: ICD-10-CM

## 2020-01-28 ENCOUNTER — COMMUNICATION - HEALTHEAST (OUTPATIENT)
Dept: INTERNAL MEDICINE | Facility: CLINIC | Age: 75
End: 2020-01-28

## 2020-01-28 ENCOUNTER — COMMUNICATION - HEALTHEAST (OUTPATIENT)
Dept: CARDIOLOGY | Facility: CLINIC | Age: 75
End: 2020-01-28

## 2020-01-28 ENCOUNTER — OFFICE VISIT - HEALTHEAST (OUTPATIENT)
Dept: CARDIOLOGY | Facility: CLINIC | Age: 75
End: 2020-01-28

## 2020-01-28 DIAGNOSIS — I10 ESSENTIAL HYPERTENSION: ICD-10-CM

## 2020-01-28 DIAGNOSIS — I42.9 CARDIOMYOPATHY, IDIOPATHIC (H): ICD-10-CM

## 2020-01-28 DIAGNOSIS — R32 URINARY INCONTINENCE, UNSPECIFIED TYPE: ICD-10-CM

## 2020-01-28 ASSESSMENT — MIFFLIN-ST. JEOR: SCORE: 1100.97

## 2020-01-29 ENCOUNTER — OFFICE VISIT - HEALTHEAST (OUTPATIENT)
Dept: PHYSICAL THERAPY | Facility: REHABILITATION | Age: 75
End: 2020-01-29

## 2020-01-29 DIAGNOSIS — M25.552 CHRONIC LEFT HIP PAIN: ICD-10-CM

## 2020-01-29 DIAGNOSIS — R29.898 WEAKNESS OF LEFT HIP: ICD-10-CM

## 2020-01-29 DIAGNOSIS — M62.81 MUSCLE WEAKNESS (GENERALIZED): ICD-10-CM

## 2020-01-29 DIAGNOSIS — M54.50 CHRONIC BILATERAL LOW BACK PAIN WITHOUT SCIATICA: ICD-10-CM

## 2020-01-29 DIAGNOSIS — R26.9 ABNORMALITY OF GAIT: ICD-10-CM

## 2020-01-29 DIAGNOSIS — G89.29 CHRONIC BILATERAL LOW BACK PAIN WITHOUT SCIATICA: ICD-10-CM

## 2020-01-29 DIAGNOSIS — G89.29 CHRONIC LEFT HIP PAIN: ICD-10-CM

## 2020-01-31 ENCOUNTER — OFFICE VISIT - HEALTHEAST (OUTPATIENT)
Dept: PHYSICAL THERAPY | Facility: REHABILITATION | Age: 75
End: 2020-01-31

## 2020-01-31 DIAGNOSIS — R26.9 ABNORMALITY OF GAIT: ICD-10-CM

## 2020-01-31 DIAGNOSIS — G89.29 CHRONIC LEFT HIP PAIN: ICD-10-CM

## 2020-01-31 DIAGNOSIS — R29.898 WEAKNESS OF LEFT HIP: ICD-10-CM

## 2020-01-31 DIAGNOSIS — G89.29 CHRONIC BILATERAL LOW BACK PAIN WITHOUT SCIATICA: ICD-10-CM

## 2020-01-31 DIAGNOSIS — M54.50 CHRONIC BILATERAL LOW BACK PAIN WITHOUT SCIATICA: ICD-10-CM

## 2020-01-31 DIAGNOSIS — M25.552 CHRONIC LEFT HIP PAIN: ICD-10-CM

## 2020-01-31 DIAGNOSIS — M62.81 MUSCLE WEAKNESS (GENERALIZED): ICD-10-CM

## 2020-02-04 ENCOUNTER — OFFICE VISIT - HEALTHEAST (OUTPATIENT)
Dept: INTERNAL MEDICINE | Facility: CLINIC | Age: 75
End: 2020-02-04

## 2020-02-04 DIAGNOSIS — C50.112 MALIGNANT NEOPLASM OF CENTRAL PORTION OF LEFT BREAST IN FEMALE, ESTROGEN RECEPTOR POSITIVE (H): ICD-10-CM

## 2020-02-04 DIAGNOSIS — Z17.0 MALIGNANT NEOPLASM OF CENTRAL PORTION OF LEFT BREAST IN FEMALE, ESTROGEN RECEPTOR POSITIVE (H): ICD-10-CM

## 2020-02-04 DIAGNOSIS — N18.30 CKD (CHRONIC KIDNEY DISEASE) STAGE 3, GFR 30-59 ML/MIN (H): ICD-10-CM

## 2020-02-04 DIAGNOSIS — M81.0 OSTEOPOROSIS, UNSPECIFIED OSTEOPOROSIS TYPE, UNSPECIFIED PATHOLOGICAL FRACTURE PRESENCE: ICD-10-CM

## 2020-02-05 ENCOUNTER — COMMUNICATION - HEALTHEAST (OUTPATIENT)
Dept: RESPIRATORY THERAPY | Facility: CLINIC | Age: 75
End: 2020-02-05

## 2020-02-06 ENCOUNTER — OFFICE VISIT - HEALTHEAST (OUTPATIENT)
Dept: PHYSICAL THERAPY | Facility: REHABILITATION | Age: 75
End: 2020-02-06

## 2020-02-06 DIAGNOSIS — G89.29 CHRONIC LEFT HIP PAIN: ICD-10-CM

## 2020-02-06 DIAGNOSIS — M25.552 CHRONIC LEFT HIP PAIN: ICD-10-CM

## 2020-02-06 DIAGNOSIS — R29.898 WEAKNESS OF LEFT HIP: ICD-10-CM

## 2020-02-06 DIAGNOSIS — M54.50 CHRONIC BILATERAL LOW BACK PAIN WITHOUT SCIATICA: ICD-10-CM

## 2020-02-06 DIAGNOSIS — R26.9 ABNORMALITY OF GAIT: ICD-10-CM

## 2020-02-06 DIAGNOSIS — M62.81 MUSCLE WEAKNESS (GENERALIZED): ICD-10-CM

## 2020-02-06 DIAGNOSIS — G89.29 CHRONIC BILATERAL LOW BACK PAIN WITHOUT SCIATICA: ICD-10-CM

## 2020-02-10 ENCOUNTER — OFFICE VISIT - HEALTHEAST (OUTPATIENT)
Dept: PHYSICAL THERAPY | Facility: REHABILITATION | Age: 75
End: 2020-02-10

## 2020-02-10 DIAGNOSIS — G89.29 CHRONIC LEFT HIP PAIN: ICD-10-CM

## 2020-02-10 DIAGNOSIS — R29.898 WEAKNESS OF LEFT HIP: ICD-10-CM

## 2020-02-10 DIAGNOSIS — M25.552 CHRONIC LEFT HIP PAIN: ICD-10-CM

## 2020-02-10 DIAGNOSIS — M54.50 CHRONIC BILATERAL LOW BACK PAIN WITHOUT SCIATICA: ICD-10-CM

## 2020-02-10 DIAGNOSIS — G89.29 CHRONIC BILATERAL LOW BACK PAIN WITHOUT SCIATICA: ICD-10-CM

## 2020-02-10 DIAGNOSIS — M62.81 MUSCLE WEAKNESS (GENERALIZED): ICD-10-CM

## 2020-02-10 DIAGNOSIS — R26.9 ABNORMALITY OF GAIT: ICD-10-CM

## 2020-02-13 ENCOUNTER — OFFICE VISIT - HEALTHEAST (OUTPATIENT)
Dept: PHYSICAL THERAPY | Facility: REHABILITATION | Age: 75
End: 2020-02-13

## 2020-02-13 DIAGNOSIS — M25.552 CHRONIC LEFT HIP PAIN: ICD-10-CM

## 2020-02-13 DIAGNOSIS — R29.898 WEAKNESS OF LEFT HIP: ICD-10-CM

## 2020-02-13 DIAGNOSIS — G89.29 CHRONIC BILATERAL LOW BACK PAIN WITHOUT SCIATICA: ICD-10-CM

## 2020-02-13 DIAGNOSIS — M54.50 CHRONIC BILATERAL LOW BACK PAIN WITHOUT SCIATICA: ICD-10-CM

## 2020-02-13 DIAGNOSIS — R26.9 ABNORMALITY OF GAIT: ICD-10-CM

## 2020-02-13 DIAGNOSIS — G89.29 CHRONIC LEFT HIP PAIN: ICD-10-CM

## 2020-02-13 DIAGNOSIS — M62.81 MUSCLE WEAKNESS (GENERALIZED): ICD-10-CM

## 2020-02-14 ENCOUNTER — OFFICE VISIT - HEALTHEAST (OUTPATIENT)
Dept: INTERNAL MEDICINE | Facility: CLINIC | Age: 75
End: 2020-02-14

## 2020-02-14 DIAGNOSIS — M81.0 OSTEOPOROSIS, UNSPECIFIED OSTEOPOROSIS TYPE, UNSPECIFIED PATHOLOGICAL FRACTURE PRESENCE: ICD-10-CM

## 2020-02-14 DIAGNOSIS — Z86.73 HISTORY OF STROKE: ICD-10-CM

## 2020-02-14 DIAGNOSIS — Z85.3 HISTORY OF INVASIVE BREAST CANCER: ICD-10-CM

## 2020-02-14 DIAGNOSIS — Z96.642 S/P HIP REPLACEMENT, LEFT: ICD-10-CM

## 2020-02-14 DIAGNOSIS — I42.9 CARDIOMYOPATHY, IDIOPATHIC (H): ICD-10-CM

## 2020-02-14 DIAGNOSIS — J43.9 PULMONARY EMPHYSEMA, UNSPECIFIED EMPHYSEMA TYPE (H): ICD-10-CM

## 2020-02-17 ENCOUNTER — OFFICE VISIT - HEALTHEAST (OUTPATIENT)
Dept: PHYSICAL THERAPY | Facility: REHABILITATION | Age: 75
End: 2020-02-17

## 2020-02-17 DIAGNOSIS — R26.9 ABNORMALITY OF GAIT: ICD-10-CM

## 2020-02-17 DIAGNOSIS — R29.898 WEAKNESS OF LEFT HIP: ICD-10-CM

## 2020-02-17 DIAGNOSIS — M54.50 CHRONIC BILATERAL LOW BACK PAIN WITHOUT SCIATICA: ICD-10-CM

## 2020-02-17 DIAGNOSIS — M62.81 MUSCLE WEAKNESS (GENERALIZED): ICD-10-CM

## 2020-02-17 DIAGNOSIS — G89.29 CHRONIC LEFT HIP PAIN: ICD-10-CM

## 2020-02-17 DIAGNOSIS — G89.29 CHRONIC BILATERAL LOW BACK PAIN WITHOUT SCIATICA: ICD-10-CM

## 2020-02-17 DIAGNOSIS — M25.552 CHRONIC LEFT HIP PAIN: ICD-10-CM

## 2020-02-18 ENCOUNTER — COMMUNICATION - HEALTHEAST (OUTPATIENT)
Dept: INTERNAL MEDICINE | Facility: CLINIC | Age: 75
End: 2020-02-18

## 2020-02-18 DIAGNOSIS — E78.00 HYPERCHOLESTEROLEMIA: ICD-10-CM

## 2020-02-20 ENCOUNTER — OFFICE VISIT - HEALTHEAST (OUTPATIENT)
Dept: PHYSICAL THERAPY | Facility: REHABILITATION | Age: 75
End: 2020-02-20

## 2020-02-20 DIAGNOSIS — M62.81 MUSCLE WEAKNESS (GENERALIZED): ICD-10-CM

## 2020-02-20 DIAGNOSIS — R26.9 ABNORMALITY OF GAIT: ICD-10-CM

## 2020-02-20 DIAGNOSIS — R29.898 WEAKNESS OF LEFT HIP: ICD-10-CM

## 2020-02-20 DIAGNOSIS — G89.29 CHRONIC BILATERAL LOW BACK PAIN WITHOUT SCIATICA: ICD-10-CM

## 2020-02-20 DIAGNOSIS — G89.29 CHRONIC LEFT HIP PAIN: ICD-10-CM

## 2020-02-20 DIAGNOSIS — M54.50 CHRONIC BILATERAL LOW BACK PAIN WITHOUT SCIATICA: ICD-10-CM

## 2020-02-20 DIAGNOSIS — M25.552 CHRONIC LEFT HIP PAIN: ICD-10-CM

## 2020-02-24 ENCOUNTER — OFFICE VISIT - HEALTHEAST (OUTPATIENT)
Dept: PHYSICAL THERAPY | Facility: REHABILITATION | Age: 75
End: 2020-02-24

## 2020-02-24 DIAGNOSIS — M62.81 MUSCLE WEAKNESS (GENERALIZED): ICD-10-CM

## 2020-02-24 DIAGNOSIS — R29.898 WEAKNESS OF LEFT HIP: ICD-10-CM

## 2020-02-24 DIAGNOSIS — R26.9 ABNORMALITY OF GAIT: ICD-10-CM

## 2020-02-24 DIAGNOSIS — G89.29 CHRONIC BILATERAL LOW BACK PAIN WITHOUT SCIATICA: ICD-10-CM

## 2020-02-24 DIAGNOSIS — M25.552 CHRONIC LEFT HIP PAIN: ICD-10-CM

## 2020-02-24 DIAGNOSIS — G89.29 CHRONIC LEFT HIP PAIN: ICD-10-CM

## 2020-02-24 DIAGNOSIS — M54.50 CHRONIC BILATERAL LOW BACK PAIN WITHOUT SCIATICA: ICD-10-CM

## 2020-03-02 ENCOUNTER — OFFICE VISIT - HEALTHEAST (OUTPATIENT)
Dept: PHYSICAL THERAPY | Facility: REHABILITATION | Age: 75
End: 2020-03-02

## 2020-03-02 DIAGNOSIS — M25.552 CHRONIC LEFT HIP PAIN: ICD-10-CM

## 2020-03-02 DIAGNOSIS — R26.9 ABNORMALITY OF GAIT: ICD-10-CM

## 2020-03-02 DIAGNOSIS — M54.50 CHRONIC BILATERAL LOW BACK PAIN WITHOUT SCIATICA: ICD-10-CM

## 2020-03-02 DIAGNOSIS — M62.81 MUSCLE WEAKNESS (GENERALIZED): ICD-10-CM

## 2020-03-02 DIAGNOSIS — G89.29 CHRONIC LEFT HIP PAIN: ICD-10-CM

## 2020-03-02 DIAGNOSIS — G89.29 CHRONIC BILATERAL LOW BACK PAIN WITHOUT SCIATICA: ICD-10-CM

## 2020-03-02 DIAGNOSIS — R29.898 WEAKNESS OF LEFT HIP: ICD-10-CM

## 2020-03-03 ENCOUNTER — COMMUNICATION - HEALTHEAST (OUTPATIENT)
Dept: RESPIRATORY THERAPY | Facility: CLINIC | Age: 75
End: 2020-03-03

## 2020-03-05 ENCOUNTER — OFFICE VISIT - HEALTHEAST (OUTPATIENT)
Dept: PHYSICAL THERAPY | Facility: REHABILITATION | Age: 75
End: 2020-03-05

## 2020-03-05 DIAGNOSIS — M54.50 CHRONIC BILATERAL LOW BACK PAIN WITHOUT SCIATICA: ICD-10-CM

## 2020-03-05 DIAGNOSIS — G89.29 CHRONIC LEFT HIP PAIN: ICD-10-CM

## 2020-03-05 DIAGNOSIS — R29.898 WEAKNESS OF LEFT HIP: ICD-10-CM

## 2020-03-05 DIAGNOSIS — G89.29 CHRONIC BILATERAL LOW BACK PAIN WITHOUT SCIATICA: ICD-10-CM

## 2020-03-05 DIAGNOSIS — M62.81 MUSCLE WEAKNESS (GENERALIZED): ICD-10-CM

## 2020-03-05 DIAGNOSIS — M25.552 CHRONIC LEFT HIP PAIN: ICD-10-CM

## 2020-03-05 DIAGNOSIS — R26.9 ABNORMALITY OF GAIT: ICD-10-CM

## 2020-03-09 ENCOUNTER — HOSPITAL ENCOUNTER (OUTPATIENT)
Dept: PALLIATIVE MEDICINE | Facility: OTHER | Age: 75
Discharge: HOME | End: 2020-03-09
Attending: ANESTHESIOLOGY

## 2020-03-09 ENCOUNTER — COMMUNICATION - HEALTHEAST (OUTPATIENT)
Dept: PALLIATIVE MEDICINE | Facility: OTHER | Age: 75
End: 2020-03-09

## 2020-03-09 DIAGNOSIS — M16.12 OSTEOARTHRITIS OF LEFT HIP, UNSPECIFIED OSTEOARTHRITIS TYPE: ICD-10-CM

## 2020-03-09 DIAGNOSIS — G89.4 CHRONIC PAIN SYNDROME: ICD-10-CM

## 2020-03-09 DIAGNOSIS — M79.605 LEG PAIN, LEFT: ICD-10-CM

## 2020-03-09 RX ORDER — EPINEPHRINE 0.3 MG/.3ML
0.3 INJECTION SUBCUTANEOUS PRN
Status: SHIPPED | COMMUNITY
Start: 2020-02-26 | End: 2022-10-11

## 2020-03-09 ASSESSMENT — MIFFLIN-ST. JEOR: SCORE: 1128.18

## 2020-03-11 ENCOUNTER — OFFICE VISIT - HEALTHEAST (OUTPATIENT)
Dept: INTERNAL MEDICINE | Facility: CLINIC | Age: 75
End: 2020-03-11

## 2020-03-11 DIAGNOSIS — I42.9 CARDIOMYOPATHY, IDIOPATHIC (H): ICD-10-CM

## 2020-03-11 DIAGNOSIS — I10 ESSENTIAL HYPERTENSION: ICD-10-CM

## 2020-03-11 DIAGNOSIS — Z96.642 S/P HIP REPLACEMENT, LEFT: ICD-10-CM

## 2020-03-11 DIAGNOSIS — J43.9 PULMONARY EMPHYSEMA, UNSPECIFIED EMPHYSEMA TYPE (H): ICD-10-CM

## 2020-03-12 ENCOUNTER — AMBULATORY - HEALTHEAST (OUTPATIENT)
Dept: LAB | Facility: CLINIC | Age: 75
End: 2020-03-12

## 2020-03-12 DIAGNOSIS — I10 ESSENTIAL HYPERTENSION: ICD-10-CM

## 2020-03-12 DIAGNOSIS — I42.9 CARDIOMYOPATHY, IDIOPATHIC (H): ICD-10-CM

## 2020-03-12 LAB
ANION GAP SERPL CALCULATED.3IONS-SCNC: 14 MMOL/L (ref 5–18)
BUN SERPL-MCNC: 16 MG/DL (ref 8–28)
CALCIUM SERPL-MCNC: 8.6 MG/DL (ref 8.5–10.5)
CHLORIDE BLD-SCNC: 106 MMOL/L (ref 98–107)
CO2 SERPL-SCNC: 21 MMOL/L (ref 22–31)
CREAT SERPL-MCNC: 1.42 MG/DL (ref 0.6–1.1)
GFR SERPL CREATININE-BSD FRML MDRD: 36 ML/MIN/1.73M2
GLUCOSE BLD-MCNC: 77 MG/DL (ref 70–125)
POTASSIUM BLD-SCNC: 4.2 MMOL/L (ref 3.5–5)
SODIUM SERPL-SCNC: 141 MMOL/L (ref 136–145)

## 2020-03-13 ENCOUNTER — OFFICE VISIT - HEALTHEAST (OUTPATIENT)
Dept: PHYSICAL THERAPY | Facility: REHABILITATION | Age: 75
End: 2020-03-13

## 2020-03-13 DIAGNOSIS — R26.9 ABNORMALITY OF GAIT: ICD-10-CM

## 2020-03-13 DIAGNOSIS — G89.29 CHRONIC LEFT HIP PAIN: ICD-10-CM

## 2020-03-13 DIAGNOSIS — M25.552 CHRONIC LEFT HIP PAIN: ICD-10-CM

## 2020-03-22 ENCOUNTER — COMMUNICATION - HEALTHEAST (OUTPATIENT)
Dept: INTERNAL MEDICINE | Facility: CLINIC | Age: 75
End: 2020-03-22

## 2020-03-22 DIAGNOSIS — I10 ESSENTIAL HYPERTENSION: ICD-10-CM

## 2020-04-06 ENCOUNTER — COMMUNICATION - HEALTHEAST (OUTPATIENT)
Dept: INTERNAL MEDICINE | Facility: CLINIC | Age: 75
End: 2020-04-06

## 2020-04-06 DIAGNOSIS — R32 URINARY INCONTINENCE, UNSPECIFIED TYPE: ICD-10-CM

## 2020-04-08 ENCOUNTER — COMMUNICATION - HEALTHEAST (OUTPATIENT)
Dept: INTERNAL MEDICINE | Facility: CLINIC | Age: 75
End: 2020-04-08

## 2020-04-08 DIAGNOSIS — R12 HEARTBURN: ICD-10-CM

## 2020-04-24 ENCOUNTER — COMMUNICATION - HEALTHEAST (OUTPATIENT)
Dept: FAMILY MEDICINE | Facility: CLINIC | Age: 75
End: 2020-04-24

## 2020-04-24 DIAGNOSIS — R21 RASH: ICD-10-CM

## 2020-05-12 ENCOUNTER — COMMUNICATION - HEALTHEAST (OUTPATIENT)
Dept: ADMINISTRATIVE | Facility: HOSPITAL | Age: 75
End: 2020-05-12

## 2020-05-19 ENCOUNTER — RECORDS - HEALTHEAST (OUTPATIENT)
Dept: ADMINISTRATIVE | Facility: OTHER | Age: 75
End: 2020-05-19

## 2020-05-29 ENCOUNTER — HOSPITAL ENCOUNTER (OUTPATIENT)
Dept: CT IMAGING | Facility: CLINIC | Age: 75
Discharge: HOME | End: 2020-05-29
Attending: INTERNAL MEDICINE

## 2020-05-29 ENCOUNTER — HOSPITAL ENCOUNTER (OUTPATIENT)
Dept: MAMMOGRAPHY | Facility: CLINIC | Age: 75
Discharge: HOME | End: 2020-05-29
Attending: INTERNAL MEDICINE

## 2020-05-29 ENCOUNTER — COMMUNICATION - HEALTHEAST (OUTPATIENT)
Dept: TELEHEALTH | Facility: CLINIC | Age: 75
End: 2020-05-29

## 2020-05-29 ENCOUNTER — HOSPITAL ENCOUNTER (OUTPATIENT)
Dept: CT IMAGING | Facility: CLINIC | Age: 75
Discharge: HOME | End: 2020-05-29

## 2020-05-29 DIAGNOSIS — J32.9 SINUSITIS, UNSPECIFIED CHRONICITY, UNSPECIFIED LOCATION: ICD-10-CM

## 2020-05-29 DIAGNOSIS — R06.02 SOB (SHORTNESS OF BREATH): ICD-10-CM

## 2020-05-29 DIAGNOSIS — Z12.31 ENCOUNTER FOR SCREENING MAMMOGRAM FOR BREAST CANCER: ICD-10-CM

## 2020-05-29 DIAGNOSIS — J44.9 COPD (CHRONIC OBSTRUCTIVE PULMONARY DISEASE) (H): ICD-10-CM

## 2020-06-10 ENCOUNTER — RECORDS - HEALTHEAST (OUTPATIENT)
Dept: ADMINISTRATIVE | Facility: OTHER | Age: 75
End: 2020-06-10

## 2020-06-10 ENCOUNTER — COMMUNICATION - HEALTHEAST (OUTPATIENT)
Dept: CARDIOLOGY | Facility: CLINIC | Age: 75
End: 2020-06-10

## 2020-06-12 ENCOUNTER — HOSPITAL ENCOUNTER (OUTPATIENT)
Dept: CT IMAGING | Facility: CLINIC | Age: 75
Discharge: HOME | End: 2020-06-12

## 2020-06-12 DIAGNOSIS — J44.89: ICD-10-CM

## 2020-06-12 DIAGNOSIS — R06.02 SHORTNESS OF BREATH: ICD-10-CM

## 2020-06-12 LAB
CREAT BLD-MCNC: 1.2 MG/DL (ref 0.6–1.1)
GFR SERPL CREATININE-BSD FRML MDRD: 44 ML/MIN/1.73M2

## 2020-06-19 ENCOUNTER — COMMUNICATION - HEALTHEAST (OUTPATIENT)
Dept: CARDIOLOGY | Facility: CLINIC | Age: 75
End: 2020-06-19

## 2020-06-19 ENCOUNTER — RECORDS - HEALTHEAST (OUTPATIENT)
Dept: ADMINISTRATIVE | Facility: OTHER | Age: 75
End: 2020-06-19

## 2020-06-22 ENCOUNTER — OFFICE VISIT - HEALTHEAST (OUTPATIENT)
Dept: CARDIOLOGY | Facility: CLINIC | Age: 75
End: 2020-06-22

## 2020-06-22 DIAGNOSIS — I50.22 CHRONIC SYSTOLIC HEART FAILURE (H): ICD-10-CM

## 2020-06-22 DIAGNOSIS — R06.09 DYSPNEA ON EXERTION: ICD-10-CM

## 2020-06-22 DIAGNOSIS — J43.9 PULMONARY EMPHYSEMA, UNSPECIFIED EMPHYSEMA TYPE (H): ICD-10-CM

## 2020-06-22 DIAGNOSIS — I42.9 CARDIOMYOPATHY, IDIOPATHIC (H): ICD-10-CM

## 2020-06-22 DIAGNOSIS — I49.3 PVC (PREMATURE VENTRICULAR CONTRACTION): ICD-10-CM

## 2020-06-22 LAB
ANION GAP SERPL CALCULATED.3IONS-SCNC: 11 MMOL/L (ref 5–18)
BUN SERPL-MCNC: 18 MG/DL (ref 8–28)
CALCIUM SERPL-MCNC: 9.3 MG/DL (ref 8.5–10.5)
CHLORIDE BLD-SCNC: 104 MMOL/L (ref 98–107)
CO2 SERPL-SCNC: 27 MMOL/L (ref 22–31)
CREAT SERPL-MCNC: 1.42 MG/DL (ref 0.6–1.1)
GFR SERPL CREATININE-BSD FRML MDRD: 36 ML/MIN/1.73M2
GLUCOSE BLD-MCNC: 89 MG/DL (ref 70–125)
POTASSIUM BLD-SCNC: 4 MMOL/L (ref 3.5–5)
SODIUM SERPL-SCNC: 142 MMOL/L (ref 136–145)

## 2020-06-22 ASSESSMENT — MIFFLIN-ST. JEOR: SCORE: 1108.22

## 2020-06-23 LAB — BNP SERPL-MCNC: 618 PG/ML (ref 0–137)

## 2020-06-24 ENCOUNTER — HOSPITAL ENCOUNTER (OUTPATIENT)
Dept: RADIOLOGY | Facility: CLINIC | Age: 75
Discharge: HOME | End: 2020-06-24

## 2020-06-24 ENCOUNTER — HOSPITAL ENCOUNTER (OUTPATIENT)
Dept: NUCLEAR MEDICINE | Facility: CLINIC | Age: 75
Discharge: HOME | End: 2020-06-24

## 2020-06-24 DIAGNOSIS — I27.82 CHRONIC PULMONARY EMBOLISM, UNSPECIFIED PULMONARY EMBOLISM TYPE, UNSPECIFIED WHETHER ACUTE COR PULMONALE PRESENT (H): ICD-10-CM

## 2020-06-26 ENCOUNTER — AMBULATORY - HEALTHEAST (OUTPATIENT)
Dept: CARDIOLOGY | Facility: CLINIC | Age: 75
End: 2020-06-26

## 2020-06-26 DIAGNOSIS — R06.02 SHORTNESS OF BREATH: ICD-10-CM

## 2020-06-26 DIAGNOSIS — I42.9 CARDIOMYOPATHY, IDIOPATHIC (H): ICD-10-CM

## 2020-06-30 ENCOUNTER — RECORDS - HEALTHEAST (OUTPATIENT)
Dept: ADMINISTRATIVE | Facility: OTHER | Age: 75
End: 2020-06-30

## 2020-07-09 ENCOUNTER — OFFICE VISIT - HEALTHEAST (OUTPATIENT)
Dept: INTERNAL MEDICINE | Facility: CLINIC | Age: 75
End: 2020-07-09

## 2020-07-09 DIAGNOSIS — I42.9 CARDIOMYOPATHY, IDIOPATHIC (H): ICD-10-CM

## 2020-07-09 DIAGNOSIS — J43.9 PULMONARY EMPHYSEMA, UNSPECIFIED EMPHYSEMA TYPE (H): ICD-10-CM

## 2020-07-09 DIAGNOSIS — Z86.73 HISTORY OF STROKE: ICD-10-CM

## 2020-07-09 DIAGNOSIS — I10 ESSENTIAL HYPERTENSION: ICD-10-CM

## 2020-07-09 DIAGNOSIS — Z96.642 S/P HIP REPLACEMENT, LEFT: ICD-10-CM

## 2020-07-09 DIAGNOSIS — N18.30 CKD (CHRONIC KIDNEY DISEASE) STAGE 3, GFR 30-59 ML/MIN (H): ICD-10-CM

## 2020-07-09 DIAGNOSIS — M81.0 OSTEOPOROSIS, UNSPECIFIED OSTEOPOROSIS TYPE, UNSPECIFIED PATHOLOGICAL FRACTURE PRESENCE: ICD-10-CM

## 2020-07-09 DIAGNOSIS — Z85.3 HISTORY OF INVASIVE BREAST CANCER: ICD-10-CM

## 2020-07-09 DIAGNOSIS — E78.00 HYPERCHOLESTEROLEMIA: ICD-10-CM

## 2020-07-09 RX ORDER — SIMVASTATIN 40 MG
40 TABLET ORAL AT BEDTIME
Qty: 90 TABLET | Refills: 3 | Status: SHIPPED | OUTPATIENT
Start: 2020-07-09 | End: 2021-09-23

## 2020-07-09 ASSESSMENT — PATIENT HEALTH QUESTIONNAIRE - PHQ9: SUM OF ALL RESPONSES TO PHQ QUESTIONS 1-9: 0

## 2020-07-14 ENCOUNTER — OFFICE VISIT - HEALTHEAST (OUTPATIENT)
Dept: ONCOLOGY | Facility: CLINIC | Age: 75
End: 2020-07-14

## 2020-07-14 DIAGNOSIS — Z17.0 MALIGNANT NEOPLASM OF CENTRAL PORTION OF LEFT BREAST IN FEMALE, ESTROGEN RECEPTOR POSITIVE (H): ICD-10-CM

## 2020-07-14 DIAGNOSIS — T45.1X5A HOT FLASHES DUE TO TAMOXIFEN: ICD-10-CM

## 2020-07-14 DIAGNOSIS — R23.2 HOT FLASHES DUE TO TAMOXIFEN: ICD-10-CM

## 2020-07-14 DIAGNOSIS — C50.112 MALIGNANT NEOPLASM OF CENTRAL PORTION OF LEFT BREAST IN FEMALE, ESTROGEN RECEPTOR POSITIVE (H): ICD-10-CM

## 2020-07-20 ENCOUNTER — RECORDS - HEALTHEAST (OUTPATIENT)
Dept: ADMINISTRATIVE | Facility: OTHER | Age: 75
End: 2020-07-20

## 2020-07-27 ENCOUNTER — HOSPITAL ENCOUNTER (OUTPATIENT)
Dept: CARDIOLOGY | Facility: CLINIC | Age: 75
Discharge: HOME | End: 2020-07-27

## 2020-07-27 DIAGNOSIS — I27.82 CHRONIC PULMONARY EMBOLISM (H): ICD-10-CM

## 2020-07-27 LAB
AORTIC ROOT: 2.9 CM
AORTIC VALVE MEAN VELOCITY: 88.3 CM/S
ASCENDING AORTA: 3 CM
AV DIMENSIONLESS INDEX VTI: 0.5
AV MEAN GRADIENT: 3 MMHG
AV PEAK GRADIENT: 6.9 MMHG
AV VALVE AREA: 1.1 CM2
AV VELOCITY RATIO: 0.4
BSA FOR ECHO PROCEDURE: 1.73 M2
CV BLOOD PRESSURE: ABNORMAL MMHG
CV ECHO HEIGHT: 62 IN
CV ECHO WEIGHT: 151 LBS
DOP CALC AO PEAK VEL: 131 CM/S
DOP CALC AO VTI: 25.4 CM
DOP CALC LVOT AREA: 2.27 CM2
DOP CALC LVOT DIAMETER: 1.7 CM
DOP CALC LVOT PEAK VEL: 52.6 CM/S
DOP CALC LVOT STROKE VOLUME: 27.9 CM3
DOP CALC MV VTI: 25.7 CM
DOP CALCLVOT PEAK VEL VTI: 12.3 CM
ECHO EJECTION FRACTION ESTIMATED: 30 %
EJECTION FRACTION: 27 % (ref 55–75)
FRACTIONAL SHORTENING: 16.6 % (ref 28–44)
INTERVENTRICULAR SEPTUM IN END DIASTOLE: 0.93 CM (ref 0.6–0.9)
IVS/PW RATIO: 0.9
LA AREA 1: 13.8 CM2
LA AREA 2: 20.1 CM2
LEFT ATRIUM AREA: 13.8 CM2
LEFT ATRIUM LENGTH: 4.8 CM
LEFT ATRIUM SIZE: 3 CM
LEFT ATRIUM TO AORTIC ROOT RATIO: 1.03 NO UNITS
LEFT ATRIUM VOLUME INDEX: 28.4 ML/M2
LEFT ATRIUM VOLUME: 49.1 ML
LEFT VENTRICLE CARDIAC INDEX: 1.2 L/MIN/M2
LEFT VENTRICLE CARDIAC OUTPUT: 2 L/MIN
LEFT VENTRICLE DIASTOLIC VOLUME INDEX: 56.1 CM3/M2 (ref 29–61)
LEFT VENTRICLE DIASTOLIC VOLUME: 97 CM3 (ref 46–106)
LEFT VENTRICLE HEART RATE: 73 BPM
LEFT VENTRICLE MASS INDEX: 81.9 G/M2
LEFT VENTRICLE SYSTOLIC VOLUME INDEX: 41 CM3/M2 (ref 8–24)
LEFT VENTRICLE SYSTOLIC VOLUME: 71 CM3 (ref 14–42)
LEFT VENTRICULAR INTERNAL DIMENSION IN DIASTOLE: 4.45 CM (ref 3.8–5.2)
LEFT VENTRICULAR INTERNAL DIMENSION IN SYSTOLE: 3.71 CM (ref 2.2–3.5)
LEFT VENTRICULAR MASS: 141.6 G
LEFT VENTRICULAR OUTFLOW TRACT MEAN GRADIENT: 1 MMHG
LEFT VENTRICULAR OUTFLOW TRACT MEAN VELOCITY: 41.1 CM/S
LEFT VENTRICULAR OUTFLOW TRACT PEAK GRADIENT: 1 MMHG
LEFT VENTRICULAR POSTERIOR WALL IN END DIASTOLE: 0.99 CM (ref 0.6–0.9)
LV STROKE VOLUME INDEX: 16.1 ML/M2
MITRAL VALVE E/A RATIO: 0.5
MITRAL VALVE MEAN INFLOW VELOCITY: 58 CM/S
MITRAL VALVE PEAK VELOCITY: 138 CM/S
MV AREA VTI: 1.09 CM2
MV AVERAGE E/E' RATIO: 12.8 CM/S
MV DECELERATION TIME: 187 MS
MV E'TISSUE VEL-LAT: 4.71 CM/S
MV E'TISSUE VEL-MED: 1.9 CM/S
MV LATERAL E/E' RATIO: 9
MV MEAN GRADIENT: 2 MMHG
MV MEDIAL E/E' RATIO: 22.3
MV PEAK A VELOCITY: 92.3 CM/S
MV PEAK E VELOCITY: 42.4 CM/S
MV PEAK GRADIENT: 7.6 MMHG
MV VALVE AREA BY CONTINUITY EQUATION: 1.1 CM2
NUC REST DIASTOLIC VOLUME INDEX: 2416 LBS
NUC REST SYSTOLIC VOLUME INDEX: 62 IN
TRICUSPID REGURGITATION PEAK PRESSURE GRADIENT: 23.4 MMHG
TRICUSPID VALVE ANULAR PLANE SYSTOLIC EXCURSION: 1.3 CM
TRICUSPID VALVE PEAK REGURGITANT VELOCITY: 242 CM/S

## 2020-07-27 ASSESSMENT — MIFFLIN-ST. JEOR: SCORE: 1128.18

## 2020-07-29 ENCOUNTER — OFFICE VISIT - HEALTHEAST (OUTPATIENT)
Dept: FAMILY MEDICINE | Facility: CLINIC | Age: 75
End: 2020-07-29

## 2020-07-29 ENCOUNTER — COMMUNICATION - HEALTHEAST (OUTPATIENT)
Dept: INTERNAL MEDICINE | Facility: CLINIC | Age: 75
End: 2020-07-29

## 2020-07-29 DIAGNOSIS — M79.605 LEG PAIN, LEFT: ICD-10-CM

## 2020-07-29 DIAGNOSIS — Z23 NEED FOR PNEUMOCOCCAL VACCINATION: ICD-10-CM

## 2020-07-30 ENCOUNTER — COMMUNICATION - HEALTHEAST (OUTPATIENT)
Dept: FAMILY MEDICINE | Facility: CLINIC | Age: 75
End: 2020-07-30

## 2020-07-30 DIAGNOSIS — F51.01 PRIMARY INSOMNIA: ICD-10-CM

## 2020-08-24 ENCOUNTER — OFFICE VISIT - HEALTHEAST (OUTPATIENT)
Dept: INTERNAL MEDICINE | Facility: CLINIC | Age: 75
End: 2020-08-24

## 2020-08-24 DIAGNOSIS — F51.01 PRIMARY INSOMNIA: ICD-10-CM

## 2020-08-24 DIAGNOSIS — B02.29 POSTHERPETIC NEURALGIA: ICD-10-CM

## 2020-08-24 DIAGNOSIS — N18.30 CKD (CHRONIC KIDNEY DISEASE) STAGE 3, GFR 30-59 ML/MIN (H): ICD-10-CM

## 2020-08-24 DIAGNOSIS — R05.9 COUGH: ICD-10-CM

## 2020-08-24 DIAGNOSIS — M25.552 HIP PAIN, LEFT: ICD-10-CM

## 2020-08-24 DIAGNOSIS — J43.9 PULMONARY EMPHYSEMA, UNSPECIFIED EMPHYSEMA TYPE (H): ICD-10-CM

## 2020-08-24 DIAGNOSIS — R12 HEARTBURN: ICD-10-CM

## 2020-08-24 DIAGNOSIS — I42.9 CARDIOMYOPATHY, IDIOPATHIC (H): ICD-10-CM

## 2020-08-24 RX ORDER — GABAPENTIN 300 MG/1
600 CAPSULE ORAL 2 TIMES DAILY
Qty: 360 CAPSULE | Refills: 2 | Status: SHIPPED | OUTPATIENT
Start: 2020-08-24 | End: 2021-09-07

## 2020-09-03 ENCOUNTER — AMBULATORY - HEALTHEAST (OUTPATIENT)
Dept: ENDOCRINOLOGY | Facility: CLINIC | Age: 75
End: 2020-09-03

## 2020-09-03 DIAGNOSIS — M81.0 OSTEOPOROSIS, UNSPECIFIED OSTEOPOROSIS TYPE, UNSPECIFIED PATHOLOGICAL FRACTURE PRESENCE: ICD-10-CM

## 2020-09-28 ENCOUNTER — RECORDS - HEALTHEAST (OUTPATIENT)
Dept: ADMINISTRATIVE | Facility: OTHER | Age: 75
End: 2020-09-28

## 2020-10-07 ENCOUNTER — COMMUNICATION - HEALTHEAST (OUTPATIENT)
Dept: INTERNAL MEDICINE | Facility: CLINIC | Age: 75
End: 2020-10-07

## 2020-10-07 DIAGNOSIS — J43.9 PULMONARY EMPHYSEMA, UNSPECIFIED EMPHYSEMA TYPE (H): ICD-10-CM

## 2020-10-14 ENCOUNTER — RECORDS - HEALTHEAST (OUTPATIENT)
Dept: ADMINISTRATIVE | Facility: OTHER | Age: 75
End: 2020-10-14

## 2020-10-18 ENCOUNTER — COMMUNICATION - HEALTHEAST (OUTPATIENT)
Dept: SCHEDULING | Facility: CLINIC | Age: 75
End: 2020-10-18

## 2020-10-18 ENCOUNTER — AMBULATORY - HEALTHEAST (OUTPATIENT)
Dept: OTHER | Facility: CLINIC | Age: 75
End: 2020-10-18

## 2020-10-19 ENCOUNTER — COMMUNICATION - HEALTHEAST (OUTPATIENT)
Dept: ADMINISTRATIVE | Facility: HOSPITAL | Age: 75
End: 2020-10-19

## 2020-10-22 ENCOUNTER — AMBULATORY - HEALTHEAST (OUTPATIENT)
Dept: VASCULAR SURGERY | Facility: CLINIC | Age: 75
End: 2020-10-22

## 2020-10-22 ENCOUNTER — AMBULATORY - HEALTHEAST (OUTPATIENT)
Dept: OTHER | Facility: CLINIC | Age: 75
End: 2020-10-22

## 2020-10-22 DIAGNOSIS — I73.9 PAD (PERIPHERAL ARTERY DISEASE) (H): ICD-10-CM

## 2020-10-25 ENCOUNTER — RECORDS - HEALTHEAST (OUTPATIENT)
Dept: LAB | Facility: CLINIC | Age: 75
End: 2020-10-25

## 2020-10-26 ENCOUNTER — OFFICE VISIT - HEALTHEAST (OUTPATIENT)
Dept: GERIATRICS | Facility: CLINIC | Age: 75
End: 2020-10-26

## 2020-10-26 DIAGNOSIS — J96.11 CHRONIC RESPIRATORY FAILURE WITH HYPOXIA (H): ICD-10-CM

## 2020-10-26 DIAGNOSIS — S22.080D COMPRESSION FRACTURE OF T12 VERTEBRA WITH ROUTINE HEALING, SUBSEQUENT ENCOUNTER: ICD-10-CM

## 2020-10-26 DIAGNOSIS — J44.9 CHRONIC OBSTRUCTIVE PULMONARY DISEASE, UNSPECIFIED COPD TYPE (H): ICD-10-CM

## 2020-10-26 DIAGNOSIS — I50.33 ACUTE ON CHRONIC DIASTOLIC CONGESTIVE HEART FAILURE (H): ICD-10-CM

## 2020-10-26 DIAGNOSIS — I50.9 HEART FAILURE EXACERBATED BY SOTALOL (H): ICD-10-CM

## 2020-10-26 LAB
ANION GAP SERPL CALCULATED.3IONS-SCNC: 21 MMOL/L (ref 5–18)
BUN SERPL-MCNC: 24 MG/DL (ref 8–28)
CALCIUM SERPL-MCNC: 10 MG/DL (ref 8.5–10.5)
CALCIUM SERPL-MCNC: 10 MG/DL (ref 8.5–10.5)
CHLORIDE BLD-SCNC: 106 MMOL/L (ref 98–107)
CO2 SERPL-SCNC: 14 MMOL/L (ref 22–31)
CREAT SERPL-MCNC: 1.41 MG/DL (ref 0.6–1.1)
ERYTHROCYTE [DISTWIDTH] IN BLOOD BY AUTOMATED COUNT: 13.8 % (ref 11–14.5)
GFR SERPL CREATININE-BSD FRML MDRD: 36 ML/MIN/1.73M2
GLUCOSE BLD-MCNC: 91 MG/DL (ref 70–125)
HCT VFR BLD AUTO: 45.7 % (ref 35–47)
HGB BLD-MCNC: 14.5 G/DL (ref 12–16)
MAGNESIUM SERPL-MCNC: 2.1 MG/DL (ref 1.8–2.6)
MCH RBC QN AUTO: 29.6 PG (ref 27–34)
MCHC RBC AUTO-ENTMCNC: 31.7 G/DL (ref 32–36)
MCV RBC AUTO: 93 FL (ref 80–100)
PHOSPHATE SERPL-MCNC: 4.2 MG/DL (ref 2.5–4.5)
PLATELET # BLD AUTO: 185 THOU/UL (ref 140–440)
PMV BLD AUTO: 10.2 FL (ref 8.5–12.5)
POTASSIUM BLD-SCNC: 4.7 MMOL/L (ref 3.5–5)
RBC # BLD AUTO: 4.9 MILL/UL (ref 3.8–5.4)
SODIUM SERPL-SCNC: 141 MMOL/L (ref 136–145)
WBC: 10.8 THOU/UL (ref 4–11)

## 2020-10-28 ENCOUNTER — OFFICE VISIT - HEALTHEAST (OUTPATIENT)
Dept: GERIATRICS | Facility: CLINIC | Age: 75
End: 2020-10-28

## 2020-10-28 DIAGNOSIS — J96.11 CHRONIC RESPIRATORY FAILURE WITH HYPOXIA (H): ICD-10-CM

## 2020-10-28 DIAGNOSIS — I10 BENIGN ESSENTIAL HYPERTENSION: ICD-10-CM

## 2020-10-28 DIAGNOSIS — T14.8XXA PAIN DUE TO FRACTURE: ICD-10-CM

## 2020-10-28 DIAGNOSIS — I42.9 CARDIOMYOPATHY, IDIOPATHIC (H): ICD-10-CM

## 2020-10-28 DIAGNOSIS — S22.080D COMPRESSION FRACTURE OF T12 VERTEBRA WITH ROUTINE HEALING, SUBSEQUENT ENCOUNTER: ICD-10-CM

## 2020-10-29 ENCOUNTER — COMMUNICATION - HEALTHEAST (OUTPATIENT)
Dept: GERIATRICS | Facility: CLINIC | Age: 75
End: 2020-10-29

## 2020-10-29 DIAGNOSIS — M54.50 ACUTE BILATERAL LOW BACK PAIN WITHOUT SCIATICA: ICD-10-CM

## 2020-10-30 ENCOUNTER — AMBULATORY - HEALTHEAST (OUTPATIENT)
Dept: CARDIOLOGY | Facility: CLINIC | Age: 75
End: 2020-10-30

## 2020-11-02 ENCOUNTER — OFFICE VISIT - HEALTHEAST (OUTPATIENT)
Dept: GERIATRICS | Facility: CLINIC | Age: 75
End: 2020-11-02

## 2020-11-02 ENCOUNTER — AMBULATORY - HEALTHEAST (OUTPATIENT)
Dept: CARDIOLOGY | Facility: CLINIC | Age: 75
End: 2020-11-02

## 2020-11-02 DIAGNOSIS — S22.080D COMPRESSION FRACTURE OF T12 VERTEBRA WITH ROUTINE HEALING, SUBSEQUENT ENCOUNTER: ICD-10-CM

## 2020-11-02 DIAGNOSIS — J96.11 CHRONIC RESPIRATORY FAILURE WITH HYPOXIA (H): ICD-10-CM

## 2020-11-02 DIAGNOSIS — I50.33 ACUTE ON CHRONIC DIASTOLIC CONGESTIVE HEART FAILURE (H): ICD-10-CM

## 2020-11-02 DIAGNOSIS — I50.9 HEART FAILURE EXACERBATED BY SOTALOL (H): ICD-10-CM

## 2020-11-02 DIAGNOSIS — I10 BENIGN ESSENTIAL HYPERTENSION: ICD-10-CM

## 2020-11-03 ENCOUNTER — RECORDS - HEALTHEAST (OUTPATIENT)
Dept: LAB | Facility: CLINIC | Age: 75
End: 2020-11-03

## 2020-11-04 ENCOUNTER — OFFICE VISIT - HEALTHEAST (OUTPATIENT)
Dept: GERIATRICS | Facility: CLINIC | Age: 75
End: 2020-11-04

## 2020-11-04 ENCOUNTER — COMMUNICATION - HEALTHEAST (OUTPATIENT)
Dept: GERIATRICS | Facility: CLINIC | Age: 75
End: 2020-11-04

## 2020-11-04 DIAGNOSIS — I50.33 ACUTE ON CHRONIC DIASTOLIC CONGESTIVE HEART FAILURE (H): ICD-10-CM

## 2020-11-04 DIAGNOSIS — T14.8XXA PAIN DUE TO FRACTURE: ICD-10-CM

## 2020-11-04 DIAGNOSIS — K21.9 GASTROESOPHAGEAL REFLUX DISEASE WITHOUT ESOPHAGITIS: ICD-10-CM

## 2020-11-04 DIAGNOSIS — J43.2 CENTRILOBULAR EMPHYSEMA (H): ICD-10-CM

## 2020-11-04 DIAGNOSIS — J96.11 CHRONIC RESPIRATORY FAILURE WITH HYPOXIA (H): ICD-10-CM

## 2020-11-04 LAB
ANION GAP SERPL CALCULATED.3IONS-SCNC: 9 MMOL/L (ref 5–18)
BUN SERPL-MCNC: 34 MG/DL (ref 8–28)
CALCIUM SERPL-MCNC: 9.1 MG/DL (ref 8.5–10.5)
CHLORIDE BLD-SCNC: 105 MMOL/L (ref 98–107)
CO2 SERPL-SCNC: 26 MMOL/L (ref 22–31)
CREAT SERPL-MCNC: 1.46 MG/DL (ref 0.6–1.1)
GFR SERPL CREATININE-BSD FRML MDRD: 35 ML/MIN/1.73M2
GLUCOSE BLD-MCNC: 77 MG/DL (ref 70–125)
POTASSIUM BLD-SCNC: 4 MMOL/L (ref 3.5–5)
SODIUM SERPL-SCNC: 140 MMOL/L (ref 136–145)

## 2020-11-09 ENCOUNTER — OFFICE VISIT - HEALTHEAST (OUTPATIENT)
Dept: GERIATRICS | Facility: CLINIC | Age: 75
End: 2020-11-09

## 2020-11-09 DIAGNOSIS — T14.8XXA PAIN DUE TO FRACTURE: ICD-10-CM

## 2020-11-09 DIAGNOSIS — I10 BENIGN ESSENTIAL HYPERTENSION: ICD-10-CM

## 2020-11-09 DIAGNOSIS — S22.080D COMPRESSION FRACTURE OF T12 VERTEBRA WITH ROUTINE HEALING, SUBSEQUENT ENCOUNTER: ICD-10-CM

## 2020-11-09 DIAGNOSIS — J96.11 CHRONIC RESPIRATORY FAILURE WITH HYPOXIA (H): ICD-10-CM

## 2020-11-09 DIAGNOSIS — I50.33 ACUTE ON CHRONIC DIASTOLIC CONGESTIVE HEART FAILURE (H): ICD-10-CM

## 2020-11-10 ENCOUNTER — COMMUNICATION - HEALTHEAST (OUTPATIENT)
Dept: GERIATRICS | Facility: CLINIC | Age: 75
End: 2020-11-10

## 2020-11-10 DIAGNOSIS — M54.50 ACUTE BILATERAL LOW BACK PAIN WITHOUT SCIATICA: ICD-10-CM

## 2020-11-11 ENCOUNTER — OFFICE VISIT - HEALTHEAST (OUTPATIENT)
Dept: GERIATRICS | Facility: CLINIC | Age: 75
End: 2020-11-11

## 2020-11-11 ENCOUNTER — RECORDS - HEALTHEAST (OUTPATIENT)
Dept: ADMINISTRATIVE | Facility: OTHER | Age: 75
End: 2020-11-11

## 2020-11-11 DIAGNOSIS — I10 BENIGN ESSENTIAL HYPERTENSION: ICD-10-CM

## 2020-11-11 DIAGNOSIS — J43.2 CENTRILOBULAR EMPHYSEMA (H): ICD-10-CM

## 2020-11-11 DIAGNOSIS — T14.8XXA PAIN DUE TO FRACTURE: ICD-10-CM

## 2020-11-11 DIAGNOSIS — S22.080D COMPRESSION FRACTURE OF T12 VERTEBRA WITH ROUTINE HEALING, SUBSEQUENT ENCOUNTER: ICD-10-CM

## 2020-11-11 DIAGNOSIS — J96.11 CHRONIC RESPIRATORY FAILURE WITH HYPOXIA (H): ICD-10-CM

## 2020-11-11 DIAGNOSIS — I50.33 ACUTE ON CHRONIC DIASTOLIC CONGESTIVE HEART FAILURE (H): ICD-10-CM

## 2020-11-16 ENCOUNTER — OFFICE VISIT - HEALTHEAST (OUTPATIENT)
Dept: GERIATRICS | Facility: CLINIC | Age: 75
End: 2020-11-16

## 2020-11-16 DIAGNOSIS — J96.11 CHRONIC RESPIRATORY FAILURE WITH HYPOXIA (H): ICD-10-CM

## 2020-11-16 DIAGNOSIS — I10 BENIGN ESSENTIAL HYPERTENSION: ICD-10-CM

## 2020-11-16 DIAGNOSIS — I50.33 ACUTE ON CHRONIC DIASTOLIC CONGESTIVE HEART FAILURE (H): ICD-10-CM

## 2020-11-16 DIAGNOSIS — J43.2 CENTRILOBULAR EMPHYSEMA (H): ICD-10-CM

## 2020-11-17 ENCOUNTER — RECORDS - HEALTHEAST (OUTPATIENT)
Dept: LAB | Facility: CLINIC | Age: 75
End: 2020-11-17

## 2020-11-18 ENCOUNTER — OFFICE VISIT - HEALTHEAST (OUTPATIENT)
Dept: GERIATRICS | Facility: CLINIC | Age: 75
End: 2020-11-18

## 2020-11-18 DIAGNOSIS — J96.11 CHRONIC RESPIRATORY FAILURE WITH HYPOXIA (H): ICD-10-CM

## 2020-11-18 DIAGNOSIS — I10 ESSENTIAL HYPERTENSION: ICD-10-CM

## 2020-11-18 DIAGNOSIS — F41.8 DEPRESSION WITH ANXIETY: ICD-10-CM

## 2020-11-18 DIAGNOSIS — J43.2 CENTRILOBULAR EMPHYSEMA (H): ICD-10-CM

## 2020-11-18 LAB
ANION GAP SERPL CALCULATED.3IONS-SCNC: 14 MMOL/L (ref 5–18)
BUN SERPL-MCNC: 17 MG/DL (ref 8–28)
CALCIUM SERPL-MCNC: 9.4 MG/DL (ref 8.5–10.5)
CHLORIDE BLD-SCNC: 103 MMOL/L (ref 98–107)
CO2 SERPL-SCNC: 23 MMOL/L (ref 22–31)
CREAT SERPL-MCNC: 1.19 MG/DL (ref 0.6–1.1)
GFR SERPL CREATININE-BSD FRML MDRD: 44 ML/MIN/1.73M2
GLUCOSE BLD-MCNC: 70 MG/DL (ref 70–125)
POTASSIUM BLD-SCNC: 4 MMOL/L (ref 3.5–5)
SODIUM SERPL-SCNC: 140 MMOL/L (ref 136–145)

## 2020-11-19 ENCOUNTER — COMMUNICATION - HEALTHEAST (OUTPATIENT)
Dept: ADMINISTRATIVE | Facility: HOSPITAL | Age: 75
End: 2020-11-19

## 2020-11-23 ENCOUNTER — COMMUNICATION - HEALTHEAST (OUTPATIENT)
Dept: GERIATRICS | Facility: CLINIC | Age: 75
End: 2020-11-23

## 2020-11-23 ENCOUNTER — OFFICE VISIT - HEALTHEAST (OUTPATIENT)
Dept: GERIATRICS | Facility: CLINIC | Age: 75
End: 2020-11-23

## 2020-11-23 DIAGNOSIS — I50.22 CHRONIC SYSTOLIC CONGESTIVE HEART FAILURE (H): ICD-10-CM

## 2020-11-23 DIAGNOSIS — T14.8XXA PAIN DUE TO FRACTURE: ICD-10-CM

## 2020-11-23 DIAGNOSIS — I10 ESSENTIAL HYPERTENSION: ICD-10-CM

## 2020-11-23 DIAGNOSIS — J96.01 ACUTE HYPOXEMIC RESPIRATORY FAILURE (H): ICD-10-CM

## 2020-11-23 DIAGNOSIS — S22.080D COMPRESSION FRACTURE OF T12 VERTEBRA WITH ROUTINE HEALING, SUBSEQUENT ENCOUNTER: ICD-10-CM

## 2020-11-23 DIAGNOSIS — J96.11 CHRONIC RESPIRATORY FAILURE WITH HYPOXIA (H): ICD-10-CM

## 2020-11-25 ENCOUNTER — OFFICE VISIT - HEALTHEAST (OUTPATIENT)
Dept: GERIATRICS | Facility: CLINIC | Age: 75
End: 2020-11-25

## 2020-11-25 DIAGNOSIS — J44.9 CHRONIC OBSTRUCTIVE PULMONARY DISEASE, UNSPECIFIED COPD TYPE (H): ICD-10-CM

## 2020-11-25 DIAGNOSIS — J96.01 ACUTE HYPOXEMIC RESPIRATORY FAILURE (H): ICD-10-CM

## 2020-11-25 DIAGNOSIS — S22.080D COMPRESSION FRACTURE OF T12 VERTEBRA WITH ROUTINE HEALING, SUBSEQUENT ENCOUNTER: ICD-10-CM

## 2020-11-25 DIAGNOSIS — M54.50 ACUTE BILATERAL LOW BACK PAIN WITHOUT SCIATICA: ICD-10-CM

## 2020-11-25 DIAGNOSIS — I50.22 CHRONIC SYSTOLIC CONGESTIVE HEART FAILURE (H): ICD-10-CM

## 2020-11-26 ENCOUNTER — HOME CARE/HOSPICE - HEALTHEAST (OUTPATIENT)
Dept: HOME HEALTH SERVICES | Facility: HOME HEALTH | Age: 75
End: 2020-11-26

## 2020-11-30 ENCOUNTER — OFFICE VISIT - HEALTHEAST (OUTPATIENT)
Dept: GERIATRICS | Facility: CLINIC | Age: 75
End: 2020-11-30

## 2020-11-30 DIAGNOSIS — I10 ESSENTIAL HYPERTENSION: ICD-10-CM

## 2020-11-30 DIAGNOSIS — I50.22 CHRONIC SYSTOLIC CONGESTIVE HEART FAILURE (H): ICD-10-CM

## 2020-11-30 DIAGNOSIS — J96.11 CHRONIC RESPIRATORY FAILURE WITH HYPOXIA (H): ICD-10-CM

## 2020-11-30 DIAGNOSIS — I10 BENIGN ESSENTIAL HYPERTENSION: ICD-10-CM

## 2020-11-30 DIAGNOSIS — S22.080D COMPRESSION FRACTURE OF T12 VERTEBRA WITH ROUTINE HEALING, SUBSEQUENT ENCOUNTER: ICD-10-CM

## 2020-11-30 DIAGNOSIS — T14.8XXA PAIN DUE TO FRACTURE: ICD-10-CM

## 2020-12-02 ENCOUNTER — COMMUNICATION - HEALTHEAST (OUTPATIENT)
Dept: GERIATRICS | Facility: CLINIC | Age: 75
End: 2020-12-02

## 2020-12-02 ENCOUNTER — COMMUNICATION - HEALTHEAST (OUTPATIENT)
Dept: HOME HEALTH SERVICES | Facility: HOME HEALTH | Age: 75
End: 2020-12-02

## 2020-12-02 ENCOUNTER — AMBULATORY - HEALTHEAST (OUTPATIENT)
Dept: GERIATRICS | Facility: CLINIC | Age: 75
End: 2020-12-02

## 2020-12-03 ENCOUNTER — COMMUNICATION - HEALTHEAST (OUTPATIENT)
Dept: SCHEDULING | Facility: CLINIC | Age: 75
End: 2020-12-03

## 2020-12-07 ENCOUNTER — OFFICE VISIT - HEALTHEAST (OUTPATIENT)
Dept: INTERNAL MEDICINE | Facility: CLINIC | Age: 75
End: 2020-12-07

## 2020-12-07 DIAGNOSIS — Z85.3 HISTORY OF BREAST CANCER: ICD-10-CM

## 2020-12-07 DIAGNOSIS — D68.51 HETEROZYGOUS FACTOR V LEIDEN MUTATION (H): ICD-10-CM

## 2020-12-07 DIAGNOSIS — N18.2 CHRONIC RENAL INSUFFICIENCY, STAGE 2 (MILD): ICD-10-CM

## 2020-12-07 DIAGNOSIS — F34.1 DYSTHYMIA: ICD-10-CM

## 2020-12-07 DIAGNOSIS — J96.11 CHRONIC RESPIRATORY FAILURE WITH HYPOXIA (H): ICD-10-CM

## 2020-12-07 DIAGNOSIS — Z86.73 HISTORY OF STROKE: ICD-10-CM

## 2020-12-07 DIAGNOSIS — I50.22 CHRONIC SYSTOLIC CONGESTIVE HEART FAILURE (H): ICD-10-CM

## 2020-12-07 DIAGNOSIS — Z96.642 S/P HIP REPLACEMENT, LEFT: ICD-10-CM

## 2020-12-07 DIAGNOSIS — S22.080G COMPRESSION FRACTURE OF T12 VERTEBRA WITH DELAYED HEALING, SUBSEQUENT ENCOUNTER: ICD-10-CM

## 2020-12-07 ASSESSMENT — PATIENT HEALTH QUESTIONNAIRE - PHQ9: SUM OF ALL RESPONSES TO PHQ QUESTIONS 1-9: 0

## 2020-12-15 ENCOUNTER — OFFICE VISIT - HEALTHEAST (OUTPATIENT)
Dept: FAMILY MEDICINE | Facility: CLINIC | Age: 75
End: 2020-12-15

## 2020-12-15 ENCOUNTER — COMMUNICATION - HEALTHEAST (OUTPATIENT)
Dept: HOME HEALTH SERVICES | Facility: HOME HEALTH | Age: 75
End: 2020-12-15

## 2020-12-15 DIAGNOSIS — I10 ESSENTIAL HYPERTENSION: ICD-10-CM

## 2020-12-15 DIAGNOSIS — S22.080S COMPRESSION FRACTURE OF T12 VERTEBRA, SEQUELA: ICD-10-CM

## 2020-12-15 DIAGNOSIS — J96.11 CHRONIC RESPIRATORY FAILURE WITH HYPOXIA (H): ICD-10-CM

## 2020-12-15 DIAGNOSIS — Z23 ENCOUNTER FOR IMMUNIZATION: ICD-10-CM

## 2020-12-15 DIAGNOSIS — I50.9 CHRONIC CONGESTIVE HEART FAILURE, UNSPECIFIED HEART FAILURE TYPE (H): ICD-10-CM

## 2020-12-15 DIAGNOSIS — N17.9 AKI (ACUTE KIDNEY INJURY) (H): ICD-10-CM

## 2020-12-15 LAB
ANION GAP SERPL CALCULATED.3IONS-SCNC: 13 MMOL/L (ref 5–18)
BUN SERPL-MCNC: 17 MG/DL (ref 8–28)
CALCIUM SERPL-MCNC: 9 MG/DL (ref 8.5–10.5)
CHLORIDE BLD-SCNC: 102 MMOL/L (ref 98–107)
CO2 SERPL-SCNC: 27 MMOL/L (ref 22–31)
CREAT SERPL-MCNC: 1.49 MG/DL (ref 0.6–1.1)
GFR SERPL CREATININE-BSD FRML MDRD: 34 ML/MIN/1.73M2
GLUCOSE BLD-MCNC: 84 MG/DL (ref 70–125)
POTASSIUM BLD-SCNC: 4.9 MMOL/L (ref 3.5–5)
SODIUM SERPL-SCNC: 142 MMOL/L (ref 136–145)

## 2020-12-15 ASSESSMENT — MIFFLIN-ST. JEOR: SCORE: 1097.79

## 2020-12-16 ENCOUNTER — COMMUNICATION - HEALTHEAST (OUTPATIENT)
Dept: FAMILY MEDICINE | Facility: CLINIC | Age: 75
End: 2020-12-16

## 2020-12-18 ENCOUNTER — COMMUNICATION - HEALTHEAST (OUTPATIENT)
Dept: FAMILY MEDICINE | Facility: CLINIC | Age: 75
End: 2020-12-18

## 2020-12-21 ENCOUNTER — COMMUNICATION - HEALTHEAST (OUTPATIENT)
Dept: INTERNAL MEDICINE | Facility: CLINIC | Age: 75
End: 2020-12-21

## 2020-12-22 ENCOUNTER — COMMUNICATION - HEALTHEAST (OUTPATIENT)
Dept: INTERNAL MEDICINE | Facility: CLINIC | Age: 75
End: 2020-12-22

## 2020-12-24 ENCOUNTER — RECORDS - HEALTHEAST (OUTPATIENT)
Dept: ADMINISTRATIVE | Facility: OTHER | Age: 75
End: 2020-12-24

## 2020-12-24 ENCOUNTER — COMMUNICATION - HEALTHEAST (OUTPATIENT)
Dept: SCHEDULING | Facility: CLINIC | Age: 75
End: 2020-12-24

## 2020-12-29 ENCOUNTER — RECORDS - HEALTHEAST (OUTPATIENT)
Dept: ADMINISTRATIVE | Facility: OTHER | Age: 75
End: 2020-12-29

## 2020-12-29 ENCOUNTER — OFFICE VISIT - HEALTHEAST (OUTPATIENT)
Dept: CARDIOLOGY | Facility: CLINIC | Age: 75
End: 2020-12-29

## 2020-12-29 DIAGNOSIS — R00.1 BRADYCARDIA WITH 41-50 BEATS PER MINUTE: ICD-10-CM

## 2020-12-29 DIAGNOSIS — I50.23 ACUTE ON CHRONIC SYSTOLIC HEART FAILURE (H): ICD-10-CM

## 2020-12-29 DIAGNOSIS — I42.9 CARDIOMYOPATHY, IDIOPATHIC (H): ICD-10-CM

## 2020-12-29 ASSESSMENT — MIFFLIN-ST. JEOR: SCORE: 1069.21

## 2020-12-30 LAB
ATRIAL RATE - MUSE: 86 BPM
DIASTOLIC BLOOD PRESSURE - MUSE: NORMAL
INTERPRETATION ECG - MUSE: NORMAL
P AXIS - MUSE: NORMAL
PR INTERVAL - MUSE: NORMAL
QRS DURATION - MUSE: 134 MS
QT - MUSE: 504 MS
QTC - MUSE: 535 MS
R AXIS - MUSE: -67 DEGREES
SYSTOLIC BLOOD PRESSURE - MUSE: NORMAL
T AXIS - MUSE: 112 DEGREES
VENTRICULAR RATE- MUSE: 68 BPM

## 2021-01-04 ENCOUNTER — OFFICE VISIT - HEALTHEAST (OUTPATIENT)
Dept: CARDIOLOGY | Facility: CLINIC | Age: 76
End: 2021-01-04

## 2021-01-04 ENCOUNTER — COMMUNICATION - HEALTHEAST (OUTPATIENT)
Dept: ADMINISTRATIVE | Facility: CLINIC | Age: 76
End: 2021-01-04

## 2021-01-04 ENCOUNTER — AMBULATORY - HEALTHEAST (OUTPATIENT)
Dept: LAB | Facility: CLINIC | Age: 76
End: 2021-01-04

## 2021-01-04 DIAGNOSIS — N18.31 STAGE 3A CHRONIC KIDNEY DISEASE (H): ICD-10-CM

## 2021-01-04 DIAGNOSIS — I10 BENIGN ESSENTIAL HYPERTENSION: ICD-10-CM

## 2021-01-04 DIAGNOSIS — I50.22 CHRONIC SYSTOLIC CONGESTIVE HEART FAILURE (H): ICD-10-CM

## 2021-01-04 DIAGNOSIS — I10 ESSENTIAL HYPERTENSION: ICD-10-CM

## 2021-01-04 LAB
ANION GAP SERPL CALCULATED.3IONS-SCNC: 14 MMOL/L (ref 5–18)
BNP SERPL-MCNC: 192 PG/ML (ref 0–137)
BUN SERPL-MCNC: 44 MG/DL (ref 8–28)
CALCIUM SERPL-MCNC: 9 MG/DL (ref 8.5–10.5)
CHLORIDE BLD-SCNC: 103 MMOL/L (ref 98–107)
CO2 SERPL-SCNC: 26 MMOL/L (ref 22–31)
CREAT SERPL-MCNC: 1.54 MG/DL (ref 0.6–1.1)
GFR SERPL CREATININE-BSD FRML MDRD: 33 ML/MIN/1.73M2
GLUCOSE BLD-MCNC: 91 MG/DL (ref 70–125)
POTASSIUM BLD-SCNC: 3.5 MMOL/L (ref 3.5–5)
SODIUM SERPL-SCNC: 143 MMOL/L (ref 136–145)

## 2021-01-04 ASSESSMENT — MIFFLIN-ST. JEOR: SCORE: 1083.28

## 2021-01-06 ENCOUNTER — RECORDS - HEALTHEAST (OUTPATIENT)
Dept: ADMINISTRATIVE | Facility: OTHER | Age: 76
End: 2021-01-06

## 2021-01-13 ENCOUNTER — COMMUNICATION - HEALTHEAST (OUTPATIENT)
Dept: ADMINISTRATIVE | Facility: CLINIC | Age: 76
End: 2021-01-13

## 2021-01-17 ENCOUNTER — COMMUNICATION - HEALTHEAST (OUTPATIENT)
Dept: CARDIOLOGY | Facility: CLINIC | Age: 76
End: 2021-01-17

## 2021-01-17 DIAGNOSIS — I42.9 CARDIOMYOPATHY, IDIOPATHIC (H): ICD-10-CM

## 2021-01-17 DIAGNOSIS — I10 ESSENTIAL HYPERTENSION: ICD-10-CM

## 2021-01-19 ENCOUNTER — OFFICE VISIT - HEALTHEAST (OUTPATIENT)
Dept: FAMILY MEDICINE | Facility: CLINIC | Age: 76
End: 2021-01-19

## 2021-01-19 DIAGNOSIS — S22.080G COMPRESSION FRACTURE OF T12 VERTEBRA WITH DELAYED HEALING, SUBSEQUENT ENCOUNTER: ICD-10-CM

## 2021-01-21 ENCOUNTER — OFFICE VISIT - HEALTHEAST (OUTPATIENT)
Dept: INTERNAL MEDICINE | Facility: CLINIC | Age: 76
End: 2021-01-21

## 2021-01-21 DIAGNOSIS — N18.30 CHRONIC RENAL INSUFFICIENCY, STAGE 3 (MODERATE) (H): ICD-10-CM

## 2021-01-21 DIAGNOSIS — Z85.3 HISTORY OF BREAST CANCER: ICD-10-CM

## 2021-01-21 DIAGNOSIS — J96.11 CHRONIC RESPIRATORY FAILURE WITH HYPOXIA (H): ICD-10-CM

## 2021-01-21 DIAGNOSIS — S22.080D COMPRESSION FRACTURE OF T12 VERTEBRA WITH ROUTINE HEALING, SUBSEQUENT ENCOUNTER: ICD-10-CM

## 2021-01-21 DIAGNOSIS — I50.22 CHRONIC SYSTOLIC CONGESTIVE HEART FAILURE (H): ICD-10-CM

## 2021-01-21 DIAGNOSIS — Z86.73 HISTORY OF STROKE: ICD-10-CM

## 2021-01-21 DIAGNOSIS — Z01.818 PREOPERATIVE EXAMINATION: ICD-10-CM

## 2021-01-21 DIAGNOSIS — J44.9 CHRONIC OBSTRUCTIVE PULMONARY DISEASE, UNSPECIFIED COPD TYPE (H): ICD-10-CM

## 2021-01-21 DIAGNOSIS — M80.00XG AGE-RELATED OSTEOPOROSIS WITH CURRENT PATHOLOGICAL FRACTURE WITH DELAYED HEALING, SUBSEQUENT ENCOUNTER: ICD-10-CM

## 2021-01-21 LAB
ALBUMIN SERPL-MCNC: 3.6 G/DL (ref 3.5–5)
ALP SERPL-CCNC: 63 U/L (ref 45–120)
ALT SERPL W P-5'-P-CCNC: 12 U/L (ref 0–45)
ANION GAP SERPL CALCULATED.3IONS-SCNC: 12 MMOL/L (ref 5–18)
AST SERPL W P-5'-P-CCNC: 22 U/L (ref 0–40)
BILIRUB SERPL-MCNC: 0.9 MG/DL (ref 0–1)
BUN SERPL-MCNC: 18 MG/DL (ref 8–28)
CALCIUM SERPL-MCNC: 8.3 MG/DL (ref 8.5–10.5)
CHLORIDE BLD-SCNC: 103 MMOL/L (ref 98–107)
CO2 SERPL-SCNC: 25 MMOL/L (ref 22–31)
CREAT SERPL-MCNC: 1.35 MG/DL (ref 0.6–1.1)
ERYTHROCYTE [DISTWIDTH] IN BLOOD BY AUTOMATED COUNT: 12.7 % (ref 11–14.5)
GFR SERPL CREATININE-BSD FRML MDRD: 38 ML/MIN/1.73M2
GLUCOSE BLD-MCNC: 95 MG/DL (ref 70–125)
HCT VFR BLD AUTO: 50.5 % (ref 35–47)
HGB BLD-MCNC: 16.1 G/DL (ref 12–16)
MCH RBC QN AUTO: 29.7 PG (ref 27–34)
MCHC RBC AUTO-ENTMCNC: 31.9 G/DL (ref 32–36)
MCV RBC AUTO: 93 FL (ref 80–100)
PLATELET # BLD AUTO: 240 THOU/UL (ref 140–440)
PMV BLD AUTO: 7.4 FL (ref 7–10)
POTASSIUM BLD-SCNC: 3.7 MMOL/L (ref 3.5–5)
PROT SERPL-MCNC: 7.2 G/DL (ref 6–8)
RBC # BLD AUTO: 5.42 MILL/UL (ref 3.8–5.4)
SODIUM SERPL-SCNC: 140 MMOL/L (ref 136–145)
WBC: 8 THOU/UL (ref 4–11)

## 2021-01-21 RX ORDER — FUROSEMIDE 40 MG
40 TABLET ORAL 2 TIMES DAILY
Qty: 180 TABLET | Refills: 1 | Status: SHIPPED | OUTPATIENT
Start: 2021-01-21 | End: 2021-08-23

## 2021-01-21 ASSESSMENT — MIFFLIN-ST. JEOR: SCORE: 1036.67

## 2021-01-22 ENCOUNTER — COMMUNICATION - HEALTHEAST (OUTPATIENT)
Dept: INTERNAL MEDICINE | Facility: CLINIC | Age: 76
End: 2021-01-22

## 2021-01-25 ENCOUNTER — COMMUNICATION - HEALTHEAST (OUTPATIENT)
Dept: ONCOLOGY | Facility: CLINIC | Age: 76
End: 2021-01-25

## 2021-01-25 DIAGNOSIS — N95.1 MENOPAUSAL SYNDROME (HOT FLASHES): ICD-10-CM

## 2021-01-26 RX ORDER — VENLAFAXINE HYDROCHLORIDE 150 MG/1
CAPSULE, EXTENDED RELEASE ORAL
Qty: 90 CAPSULE | Refills: 3 | Status: SHIPPED | OUTPATIENT
Start: 2021-01-26 | End: 2021-07-29

## 2021-01-27 ENCOUNTER — COMMUNICATION - HEALTHEAST (OUTPATIENT)
Dept: INTERNAL MEDICINE | Facility: CLINIC | Age: 76
End: 2021-01-27

## 2021-01-27 DIAGNOSIS — Z86.73 HISTORY OF STROKE: ICD-10-CM

## 2021-01-28 ENCOUNTER — RECORDS - HEALTHEAST (OUTPATIENT)
Dept: ADMINISTRATIVE | Facility: OTHER | Age: 76
End: 2021-01-28

## 2021-02-03 ENCOUNTER — COMMUNICATION - HEALTHEAST (OUTPATIENT)
Dept: CARDIOLOGY | Facility: CLINIC | Age: 76
End: 2021-02-03

## 2021-02-03 DIAGNOSIS — I50.33 ACUTE ON CHRONIC DIASTOLIC CONGESTIVE HEART FAILURE (H): ICD-10-CM

## 2021-02-08 ENCOUNTER — RECORDS - HEALTHEAST (OUTPATIENT)
Dept: VASCULAR ULTRASOUND | Facility: CLINIC | Age: 76
End: 2021-02-08

## 2021-02-08 ENCOUNTER — OFFICE VISIT - HEALTHEAST (OUTPATIENT)
Dept: VASCULAR SURGERY | Facility: CLINIC | Age: 76
End: 2021-02-08

## 2021-02-08 DIAGNOSIS — I73.9 PERIPHERAL VASCULAR DISEASE, UNSPECIFIED (H): ICD-10-CM

## 2021-02-08 DIAGNOSIS — I73.9 PAD (PERIPHERAL ARTERY DISEASE) (H): ICD-10-CM

## 2021-02-10 ENCOUNTER — INFUSION - HEALTHEAST (OUTPATIENT)
Dept: INFUSION THERAPY | Facility: CLINIC | Age: 76
End: 2021-02-10

## 2021-02-10 DIAGNOSIS — I50.33 ACUTE ON CHRONIC DIASTOLIC CONGESTIVE HEART FAILURE (H): ICD-10-CM

## 2021-02-10 LAB
ANION GAP SERPL CALCULATED.3IONS-SCNC: 12 MMOL/L (ref 5–18)
BUN SERPL-MCNC: 28 MG/DL (ref 8–28)
CALCIUM SERPL-MCNC: 9 MG/DL (ref 8.5–10.5)
CHLORIDE BLD-SCNC: 106 MMOL/L (ref 98–107)
CO2 SERPL-SCNC: 24 MMOL/L (ref 22–31)
CREAT SERPL-MCNC: 1.58 MG/DL (ref 0.6–1.1)
GFR SERPL CREATININE-BSD FRML MDRD: 32 ML/MIN/1.73M2
GLUCOSE BLD-MCNC: 90 MG/DL (ref 70–125)
POTASSIUM BLD-SCNC: 4.2 MMOL/L (ref 3.5–5)
SODIUM SERPL-SCNC: 142 MMOL/L (ref 136–145)

## 2021-02-11 ENCOUNTER — COMMUNICATION - HEALTHEAST (OUTPATIENT)
Dept: CARDIOLOGY | Facility: CLINIC | Age: 76
End: 2021-02-11

## 2021-02-19 ENCOUNTER — COMMUNICATION - HEALTHEAST (OUTPATIENT)
Dept: ADMINISTRATIVE | Facility: CLINIC | Age: 76
End: 2021-02-19

## 2021-02-21 ENCOUNTER — COMMUNICATION - HEALTHEAST (OUTPATIENT)
Dept: SCHEDULING | Facility: CLINIC | Age: 76
End: 2021-02-21

## 2021-02-22 ENCOUNTER — COMMUNICATION - HEALTHEAST (OUTPATIENT)
Dept: ADMINISTRATIVE | Facility: CLINIC | Age: 76
End: 2021-02-22

## 2021-03-01 ENCOUNTER — OFFICE VISIT - HEALTHEAST (OUTPATIENT)
Dept: PHARMACY | Facility: CLINIC | Age: 76
End: 2021-03-01

## 2021-03-01 DIAGNOSIS — G47.00 INSOMNIA, UNSPECIFIED TYPE: ICD-10-CM

## 2021-03-01 DIAGNOSIS — N30.00 ACUTE CYSTITIS WITHOUT HEMATURIA: ICD-10-CM

## 2021-03-01 DIAGNOSIS — D68.51 FACTOR V LEIDEN MUTATION (H): ICD-10-CM

## 2021-03-01 DIAGNOSIS — J44.9 CHRONIC OBSTRUCTIVE PULMONARY DISEASE, UNSPECIFIED COPD TYPE (H): ICD-10-CM

## 2021-03-01 DIAGNOSIS — M81.0 SENILE OSTEOPOROSIS: ICD-10-CM

## 2021-03-01 DIAGNOSIS — R32 URINARY INCONTINENCE, UNSPECIFIED TYPE: ICD-10-CM

## 2021-03-01 DIAGNOSIS — T45.1X5A HOT FLASHES DUE TO TAMOXIFEN: ICD-10-CM

## 2021-03-01 DIAGNOSIS — I50.33 ACUTE ON CHRONIC DIASTOLIC CONGESTIVE HEART FAILURE (H): ICD-10-CM

## 2021-03-01 DIAGNOSIS — R23.2 HOT FLASHES DUE TO TAMOXIFEN: ICD-10-CM

## 2021-03-01 PROCEDURE — 99605 MTMS BY PHARM NP 15 MIN: CPT | Performed by: PHARMACIST

## 2021-03-01 PROCEDURE — 99607 MTMS BY PHARM ADDL 15 MIN: CPT | Performed by: PHARMACIST

## 2021-03-02 ENCOUNTER — COMMUNICATION - HEALTHEAST (OUTPATIENT)
Dept: HOME HEALTH SERVICES | Facility: HOME HEALTH | Age: 76
End: 2021-03-02

## 2021-03-02 ENCOUNTER — AMBULATORY - HEALTHEAST (OUTPATIENT)
Dept: NURSING | Facility: CLINIC | Age: 76
End: 2021-03-02

## 2021-03-02 ENCOUNTER — OFFICE VISIT - HEALTHEAST (OUTPATIENT)
Dept: FAMILY MEDICINE | Facility: CLINIC | Age: 76
End: 2021-03-02

## 2021-03-02 DIAGNOSIS — I10 ESSENTIAL HYPERTENSION: ICD-10-CM

## 2021-03-02 DIAGNOSIS — J44.1 COPD EXACERBATION (H): ICD-10-CM

## 2021-03-02 DIAGNOSIS — N30.00 ACUTE CYSTITIS WITHOUT HEMATURIA: ICD-10-CM

## 2021-03-02 DIAGNOSIS — N18.31 STAGE 3A CHRONIC KIDNEY DISEASE (H): ICD-10-CM

## 2021-03-02 DIAGNOSIS — I50.33 ACUTE ON CHRONIC DIASTOLIC CONGESTIVE HEART FAILURE (H): ICD-10-CM

## 2021-03-02 DIAGNOSIS — S22.080D COMPRESSION FRACTURE OF T12 VERTEBRA WITH ROUTINE HEALING, SUBSEQUENT ENCOUNTER: ICD-10-CM

## 2021-03-02 LAB
ANION GAP SERPL CALCULATED.3IONS-SCNC: 13 MMOL/L (ref 5–18)
BUN SERPL-MCNC: 25 MG/DL (ref 8–28)
CALCIUM SERPL-MCNC: 9.2 MG/DL (ref 8.5–10.5)
CHLORIDE BLD-SCNC: 107 MMOL/L (ref 98–107)
CO2 SERPL-SCNC: 22 MMOL/L (ref 22–31)
CREAT SERPL-MCNC: 1.35 MG/DL (ref 0.6–1.1)
GFR SERPL CREATININE-BSD FRML MDRD: 38 ML/MIN/1.73M2
GLUCOSE BLD-MCNC: 98 MG/DL (ref 70–125)
POTASSIUM BLD-SCNC: 3.9 MMOL/L (ref 3.5–5)
SODIUM SERPL-SCNC: 142 MMOL/L (ref 136–145)

## 2021-03-02 ASSESSMENT — MIFFLIN-ST. JEOR: SCORE: 1055.15

## 2021-03-03 ENCOUNTER — COMMUNICATION - HEALTHEAST (OUTPATIENT)
Dept: FAMILY MEDICINE | Facility: CLINIC | Age: 76
End: 2021-03-03

## 2021-03-03 DIAGNOSIS — N32.81 OVERACTIVE BLADDER: ICD-10-CM

## 2021-03-03 RX ORDER — MIRABEGRON 50 MG/1
TABLET, FILM COATED, EXTENDED RELEASE ORAL
Qty: 90 TABLET | Refills: 3 | Status: SHIPPED | OUTPATIENT
Start: 2021-03-03 | End: 2021-11-01

## 2021-03-06 ENCOUNTER — COMMUNICATION - HEALTHEAST (OUTPATIENT)
Dept: ONCOLOGY | Facility: CLINIC | Age: 76
End: 2021-03-06

## 2021-03-06 DIAGNOSIS — Z17.0 MALIGNANT NEOPLASM OF CENTRAL PORTION OF LEFT BREAST IN FEMALE, ESTROGEN RECEPTOR POSITIVE (H): ICD-10-CM

## 2021-03-06 DIAGNOSIS — C50.112 MALIGNANT NEOPLASM OF CENTRAL PORTION OF LEFT BREAST IN FEMALE, ESTROGEN RECEPTOR POSITIVE (H): ICD-10-CM

## 2021-03-08 RX ORDER — TAMOXIFEN CITRATE 20 MG/1
TABLET ORAL
Qty: 90 TABLET | Refills: 3 | Status: SHIPPED | OUTPATIENT
Start: 2021-03-08 | End: 2022-03-02

## 2021-03-10 ENCOUNTER — COMMUNICATION - HEALTHEAST (OUTPATIENT)
Dept: SCHEDULING | Facility: CLINIC | Age: 76
End: 2021-03-10

## 2021-03-12 ENCOUNTER — COMMUNICATION - HEALTHEAST (OUTPATIENT)
Dept: NURSING | Facility: CLINIC | Age: 76
End: 2021-03-12

## 2021-03-17 ENCOUNTER — AMBULATORY - HEALTHEAST (OUTPATIENT)
Dept: FAMILY MEDICINE | Facility: CLINIC | Age: 76
End: 2021-03-17

## 2021-03-17 ENCOUNTER — OFFICE VISIT - HEALTHEAST (OUTPATIENT)
Dept: FAMILY MEDICINE | Facility: CLINIC | Age: 76
End: 2021-03-17

## 2021-03-17 DIAGNOSIS — I50.33 ACUTE ON CHRONIC DIASTOLIC CONGESTIVE HEART FAILURE (H): ICD-10-CM

## 2021-03-17 DIAGNOSIS — I10 ESSENTIAL HYPERTENSION: ICD-10-CM

## 2021-03-17 DIAGNOSIS — J44.9 CHRONIC OBSTRUCTIVE PULMONARY DISEASE, UNSPECIFIED COPD TYPE (H): ICD-10-CM

## 2021-03-17 DIAGNOSIS — R07.81 RIB PAIN ON LEFT SIDE: ICD-10-CM

## 2021-03-18 ENCOUNTER — COMMUNICATION - HEALTHEAST (OUTPATIENT)
Dept: FAMILY MEDICINE | Facility: CLINIC | Age: 76
End: 2021-03-18

## 2021-03-18 ENCOUNTER — COMMUNICATION - HEALTHEAST (OUTPATIENT)
Dept: INTERNAL MEDICINE | Facility: CLINIC | Age: 76
End: 2021-03-18

## 2021-03-18 DIAGNOSIS — I10 ESSENTIAL HYPERTENSION: ICD-10-CM

## 2021-03-23 ENCOUNTER — AMBULATORY - HEALTHEAST (OUTPATIENT)
Dept: NURSING | Facility: CLINIC | Age: 76
End: 2021-03-23

## 2021-03-28 ENCOUNTER — RECORDS - HEALTHEAST (OUTPATIENT)
Dept: ADMINISTRATIVE | Facility: OTHER | Age: 76
End: 2021-03-28

## 2021-03-30 ENCOUNTER — AMBULATORY - HEALTHEAST (OUTPATIENT)
Dept: GERIATRICS | Facility: CLINIC | Age: 76
End: 2021-03-30

## 2021-03-30 ENCOUNTER — RECORDS - HEALTHEAST (OUTPATIENT)
Dept: ADMINISTRATIVE | Facility: OTHER | Age: 76
End: 2021-03-30

## 2021-03-31 ENCOUNTER — AMBULATORY - HEALTHEAST (OUTPATIENT)
Dept: CARE COORDINATION | Facility: CLINIC | Age: 76
End: 2021-03-31

## 2021-03-31 ENCOUNTER — OFFICE VISIT - HEALTHEAST (OUTPATIENT)
Dept: GERIATRICS | Facility: CLINIC | Age: 76
End: 2021-03-31

## 2021-03-31 ENCOUNTER — RECORDS - HEALTHEAST (OUTPATIENT)
Dept: LAB | Facility: CLINIC | Age: 76
End: 2021-03-31

## 2021-03-31 ENCOUNTER — COMMUNICATION - HEALTHEAST (OUTPATIENT)
Dept: NURSING | Facility: CLINIC | Age: 76
End: 2021-03-31

## 2021-03-31 DIAGNOSIS — R53.81 PHYSICAL DECONDITIONING: ICD-10-CM

## 2021-03-31 DIAGNOSIS — T14.8XXA PAIN DUE TO FRACTURE: ICD-10-CM

## 2021-03-31 DIAGNOSIS — Z71.89 OTHER SPECIFIED COUNSELING: Chronic | ICD-10-CM

## 2021-03-31 DIAGNOSIS — S22.080D COMPRESSION FRACTURE OF T12 VERTEBRA WITH ROUTINE HEALING, SUBSEQUENT ENCOUNTER: ICD-10-CM

## 2021-03-31 DIAGNOSIS — J43.9 PULMONARY EMPHYSEMA, UNSPECIFIED EMPHYSEMA TYPE (H): ICD-10-CM

## 2021-03-31 DIAGNOSIS — Z71.89 ACP (ADVANCE CARE PLANNING): ICD-10-CM

## 2021-03-31 RX ORDER — LOPERAMIDE HCL 2 MG
2-4 CAPSULE ORAL 4 TIMES DAILY PRN
Status: ON HOLD | COMMUNITY
Start: 2021-03-31 | End: 2022-04-22

## 2021-03-31 RX ORDER — METOPROLOL SUCCINATE 50 MG/1
75 TABLET, EXTENDED RELEASE ORAL AT BEDTIME
Status: SHIPPED | COMMUNITY
Start: 2021-03-31 | End: 2021-09-15

## 2021-03-31 RX ORDER — ACETAMINOPHEN 500 MG
1000 TABLET ORAL 3 TIMES DAILY
Status: SHIPPED | COMMUNITY
Start: 2021-03-31

## 2021-03-31 ASSESSMENT — MIFFLIN-ST. JEOR: SCORE: 1057.42

## 2021-04-01 ENCOUNTER — OFFICE VISIT - HEALTHEAST (OUTPATIENT)
Dept: GERIATRICS | Facility: CLINIC | Age: 76
End: 2021-04-01

## 2021-04-01 ENCOUNTER — COMMUNICATION - HEALTHEAST (OUTPATIENT)
Dept: CARE COORDINATION | Facility: CLINIC | Age: 76
End: 2021-04-01

## 2021-04-01 DIAGNOSIS — N30.00 ACUTE CYSTITIS WITHOUT HEMATURIA: ICD-10-CM

## 2021-04-01 DIAGNOSIS — I50.33 ACUTE ON CHRONIC DIASTOLIC CONGESTIVE HEART FAILURE (H): ICD-10-CM

## 2021-04-01 DIAGNOSIS — J44.9 CHRONIC OBSTRUCTIVE PULMONARY DISEASE, UNSPECIFIED COPD TYPE (H): ICD-10-CM

## 2021-04-01 DIAGNOSIS — Z79.01 CHRONIC ANTICOAGULATION: ICD-10-CM

## 2021-04-01 DIAGNOSIS — J44.1 COPD EXACERBATION (H): ICD-10-CM

## 2021-04-01 DIAGNOSIS — S22.080D COMPRESSION FRACTURE OF T12 VERTEBRA WITH ROUTINE HEALING, SUBSEQUENT ENCOUNTER: ICD-10-CM

## 2021-04-01 DIAGNOSIS — J96.01 ACUTE RESPIRATORY FAILURE WITH HYPOXIA (H): ICD-10-CM

## 2021-04-01 LAB
ANION GAP SERPL CALCULATED.3IONS-SCNC: 10 MMOL/L (ref 5–18)
BUN SERPL-MCNC: 18 MG/DL (ref 8–28)
CALCIUM SERPL-MCNC: 8.2 MG/DL (ref 8.5–10.5)
CHLORIDE BLD-SCNC: 100 MMOL/L (ref 98–107)
CO2 SERPL-SCNC: 25 MMOL/L (ref 22–31)
CREAT SERPL-MCNC: 1.54 MG/DL (ref 0.6–1.1)
ERYTHROCYTE [DISTWIDTH] IN BLOOD BY AUTOMATED COUNT: 14.7 % (ref 11–14.5)
GFR SERPL CREATININE-BSD FRML MDRD: 33 ML/MIN/1.73M2
GLUCOSE BLD-MCNC: 83 MG/DL (ref 70–125)
HCT VFR BLD AUTO: 31.5 % (ref 35–47)
HGB BLD-MCNC: 10.1 G/DL (ref 12–16)
MAGNESIUM SERPL-MCNC: 1.9 MG/DL (ref 1.8–2.6)
MCH RBC QN AUTO: 28.1 PG (ref 27–34)
MCHC RBC AUTO-ENTMCNC: 32.1 G/DL (ref 32–36)
MCV RBC AUTO: 88 FL (ref 80–100)
PLATELET # BLD AUTO: 358 THOU/UL (ref 140–440)
PMV BLD AUTO: 9.7 FL (ref 8.5–12.5)
POTASSIUM BLD-SCNC: 4.2 MMOL/L (ref 3.5–5)
RBC # BLD AUTO: 3.59 MILL/UL (ref 3.8–5.4)
SODIUM SERPL-SCNC: 135 MMOL/L (ref 136–145)
WBC: 11.1 THOU/UL (ref 4–11)

## 2021-04-04 ENCOUNTER — RECORDS - HEALTHEAST (OUTPATIENT)
Dept: LAB | Facility: CLINIC | Age: 76
End: 2021-04-04

## 2021-04-05 ENCOUNTER — COMMUNICATION - HEALTHEAST (OUTPATIENT)
Dept: GERIATRICS | Facility: CLINIC | Age: 76
End: 2021-04-05

## 2021-04-05 LAB
ANION GAP SERPL CALCULATED.3IONS-SCNC: 10 MMOL/L (ref 5–18)
BUN SERPL-MCNC: 17 MG/DL (ref 8–28)
CALCIUM SERPL-MCNC: 8.5 MG/DL (ref 8.5–10.5)
CHLORIDE BLD-SCNC: 104 MMOL/L (ref 98–107)
CO2 SERPL-SCNC: 27 MMOL/L (ref 22–31)
CREAT SERPL-MCNC: 1.32 MG/DL (ref 0.6–1.1)
ERYTHROCYTE [DISTWIDTH] IN BLOOD BY AUTOMATED COUNT: 15 % (ref 11–14.5)
GFR SERPL CREATININE-BSD FRML MDRD: 39 ML/MIN/1.73M2
GLUCOSE BLD-MCNC: 77 MG/DL (ref 70–125)
HCT VFR BLD AUTO: 31.2 % (ref 35–47)
HGB BLD-MCNC: 9.8 G/DL (ref 12–16)
MCH RBC QN AUTO: 28.1 PG (ref 27–34)
MCHC RBC AUTO-ENTMCNC: 31.4 G/DL (ref 32–36)
MCV RBC AUTO: 89 FL (ref 80–100)
PLATELET # BLD AUTO: 365 THOU/UL (ref 140–440)
PMV BLD AUTO: 9.4 FL (ref 8.5–12.5)
POTASSIUM BLD-SCNC: 3.9 MMOL/L (ref 3.5–5)
RBC # BLD AUTO: 3.49 MILL/UL (ref 3.8–5.4)
SODIUM SERPL-SCNC: 141 MMOL/L (ref 136–145)
WBC: 9.5 THOU/UL (ref 4–11)

## 2021-04-07 ASSESSMENT — MIFFLIN-ST. JEOR: SCORE: 1022.04

## 2021-04-08 ENCOUNTER — OFFICE VISIT - HEALTHEAST (OUTPATIENT)
Dept: GERIATRICS | Facility: CLINIC | Age: 76
End: 2021-04-08

## 2021-04-08 DIAGNOSIS — I10 ESSENTIAL HYPERTENSION: ICD-10-CM

## 2021-04-08 DIAGNOSIS — S22.080D COMPRESSION FRACTURE OF T12 VERTEBRA WITH ROUTINE HEALING, SUBSEQUENT ENCOUNTER: ICD-10-CM

## 2021-04-08 DIAGNOSIS — I42.9 CARDIOMYOPATHY, IDIOPATHIC (H): ICD-10-CM

## 2021-04-08 DIAGNOSIS — J44.1 COPD EXACERBATION (H): ICD-10-CM

## 2021-04-08 DIAGNOSIS — R09.02 HYPOXIA: ICD-10-CM

## 2021-04-09 ENCOUNTER — OFFICE VISIT - HEALTHEAST (OUTPATIENT)
Dept: GERIATRICS | Facility: CLINIC | Age: 76
End: 2021-04-09

## 2021-04-09 DIAGNOSIS — J44.1 COPD EXACERBATION (H): ICD-10-CM

## 2021-04-09 DIAGNOSIS — R53.81 PHYSICAL DECONDITIONING: ICD-10-CM

## 2021-04-12 ENCOUNTER — OFFICE VISIT - HEALTHEAST (OUTPATIENT)
Dept: GERIATRICS | Facility: CLINIC | Age: 76
End: 2021-04-12

## 2021-04-12 DIAGNOSIS — Z91.89 AT HIGH RISK FOR IMPAIRED SKIN INTEGRITY: ICD-10-CM

## 2021-04-12 DIAGNOSIS — J43.9 PULMONARY EMPHYSEMA, UNSPECIFIED EMPHYSEMA TYPE (H): ICD-10-CM

## 2021-04-15 ENCOUNTER — OFFICE VISIT - HEALTHEAST (OUTPATIENT)
Dept: GERIATRICS | Facility: CLINIC | Age: 76
End: 2021-04-15

## 2021-04-15 DIAGNOSIS — S22.080D COMPRESSION FRACTURE OF T12 VERTEBRA WITH ROUTINE HEALING, SUBSEQUENT ENCOUNTER: ICD-10-CM

## 2021-04-15 DIAGNOSIS — J43.9 PULMONARY EMPHYSEMA, UNSPECIFIED EMPHYSEMA TYPE (H): ICD-10-CM

## 2021-04-15 DIAGNOSIS — I42.9 CARDIOMYOPATHY, IDIOPATHIC (H): ICD-10-CM

## 2021-04-15 DIAGNOSIS — R53.81 PHYSICAL DECONDITIONING: ICD-10-CM

## 2021-04-15 DIAGNOSIS — I10 ESSENTIAL HYPERTENSION: ICD-10-CM

## 2021-04-15 ASSESSMENT — MIFFLIN-ST. JEOR: SCORE: 1012.97

## 2021-04-16 ENCOUNTER — COMMUNICATION - HEALTHEAST (OUTPATIENT)
Dept: GERIATRICS | Facility: CLINIC | Age: 76
End: 2021-04-16

## 2021-04-22 ENCOUNTER — OFFICE VISIT - HEALTHEAST (OUTPATIENT)
Dept: GERIATRICS | Facility: CLINIC | Age: 76
End: 2021-04-22

## 2021-04-22 DIAGNOSIS — R09.02 HYPOXIA: ICD-10-CM

## 2021-04-22 DIAGNOSIS — I50.33 ACUTE ON CHRONIC DIASTOLIC CONGESTIVE HEART FAILURE (H): ICD-10-CM

## 2021-04-22 DIAGNOSIS — I42.9 CARDIOMYOPATHY, IDIOPATHIC (H): ICD-10-CM

## 2021-04-22 DIAGNOSIS — J43.9 PULMONARY EMPHYSEMA, UNSPECIFIED EMPHYSEMA TYPE (H): ICD-10-CM

## 2021-04-22 DIAGNOSIS — S22.080D COMPRESSION FRACTURE OF T12 VERTEBRA WITH ROUTINE HEALING, SUBSEQUENT ENCOUNTER: ICD-10-CM

## 2021-04-22 ASSESSMENT — MIFFLIN-ST. JEOR: SCORE: 1004.8

## 2021-04-26 ENCOUNTER — OFFICE VISIT - HEALTHEAST (OUTPATIENT)
Dept: GERIATRICS | Facility: CLINIC | Age: 76
End: 2021-04-26

## 2021-04-26 DIAGNOSIS — S22.080D COMPRESSION FRACTURE OF T12 VERTEBRA WITH ROUTINE HEALING, SUBSEQUENT ENCOUNTER: ICD-10-CM

## 2021-04-26 DIAGNOSIS — J43.9 PULMONARY EMPHYSEMA, UNSPECIFIED EMPHYSEMA TYPE (H): ICD-10-CM

## 2021-04-26 DIAGNOSIS — J44.1 COPD EXACERBATION (H): ICD-10-CM

## 2021-04-28 ENCOUNTER — COMMUNICATION - HEALTHEAST (OUTPATIENT)
Dept: GERIATRICS | Facility: CLINIC | Age: 76
End: 2021-04-28

## 2021-04-28 ENCOUNTER — AMBULATORY - HEALTHEAST (OUTPATIENT)
Dept: GERIATRICS | Facility: CLINIC | Age: 76
End: 2021-04-28

## 2021-04-29 ENCOUNTER — COMMUNICATION - HEALTHEAST (OUTPATIENT)
Dept: ADMINISTRATIVE | Facility: CLINIC | Age: 76
End: 2021-04-29

## 2021-04-30 ENCOUNTER — AMBULATORY - HEALTHEAST (OUTPATIENT)
Dept: CARE COORDINATION | Facility: CLINIC | Age: 76
End: 2021-04-30

## 2021-04-30 ENCOUNTER — COMMUNICATION - HEALTHEAST (OUTPATIENT)
Dept: CARE COORDINATION | Facility: CLINIC | Age: 76
End: 2021-04-30

## 2021-04-30 ENCOUNTER — COMMUNICATION - HEALTHEAST (OUTPATIENT)
Dept: NURSING | Facility: CLINIC | Age: 76
End: 2021-04-30

## 2021-04-30 DIAGNOSIS — S22.080D COMPRESSION FRACTURE OF T12 VERTEBRA WITH ROUTINE HEALING, SUBSEQUENT ENCOUNTER: ICD-10-CM

## 2021-04-30 DIAGNOSIS — J44.1 COPD EXACERBATION (H): ICD-10-CM

## 2021-05-04 ENCOUNTER — OFFICE VISIT - HEALTHEAST (OUTPATIENT)
Dept: FAMILY MEDICINE | Facility: CLINIC | Age: 76
End: 2021-05-04

## 2021-05-04 DIAGNOSIS — I50.22 CHRONIC SYSTOLIC CONGESTIVE HEART FAILURE (H): ICD-10-CM

## 2021-05-04 DIAGNOSIS — S22.080G COMPRESSION FRACTURE OF T12 VERTEBRA WITH DELAYED HEALING, SUBSEQUENT ENCOUNTER: ICD-10-CM

## 2021-05-04 DIAGNOSIS — C50.112 MALIGNANT NEOPLASM OF CENTRAL PORTION OF LEFT BREAST IN FEMALE, ESTROGEN RECEPTOR POSITIVE (H): ICD-10-CM

## 2021-05-04 DIAGNOSIS — N12 PYELONEPHRITIS: ICD-10-CM

## 2021-05-04 DIAGNOSIS — I10 ESSENTIAL HYPERTENSION: ICD-10-CM

## 2021-05-04 DIAGNOSIS — D68.51 FACTOR V LEIDEN MUTATION (H): ICD-10-CM

## 2021-05-04 DIAGNOSIS — N32.81 OAB (OVERACTIVE BLADDER): ICD-10-CM

## 2021-05-04 DIAGNOSIS — J96.11 CHRONIC RESPIRATORY FAILURE WITH HYPOXIA (H): ICD-10-CM

## 2021-05-04 DIAGNOSIS — Z17.0 MALIGNANT NEOPLASM OF CENTRAL PORTION OF LEFT BREAST IN FEMALE, ESTROGEN RECEPTOR POSITIVE (H): ICD-10-CM

## 2021-05-04 LAB
ANION GAP SERPL CALCULATED.3IONS-SCNC: 15 MMOL/L (ref 5–18)
BUN SERPL-MCNC: 32 MG/DL (ref 8–28)
CALCIUM SERPL-MCNC: 9.5 MG/DL (ref 8.5–10.5)
CHLORIDE BLD-SCNC: 100 MMOL/L (ref 98–107)
CO2 SERPL-SCNC: 25 MMOL/L (ref 22–31)
CREAT SERPL-MCNC: 1.81 MG/DL (ref 0.6–1.1)
GFR SERPL CREATININE-BSD FRML MDRD: 27 ML/MIN/1.73M2
GLUCOSE BLD-MCNC: 98 MG/DL (ref 70–125)
POTASSIUM BLD-SCNC: 4 MMOL/L (ref 3.5–5)
SODIUM SERPL-SCNC: 140 MMOL/L (ref 136–145)

## 2021-05-04 RX ORDER — TRAZODONE HYDROCHLORIDE 50 MG/1
50 TABLET, FILM COATED ORAL
Status: SHIPPED | COMMUNITY
Start: 2020-07-30 | End: 2021-07-29

## 2021-05-04 RX ORDER — FAMOTIDINE 20 MG/1
20 TABLET, FILM COATED ORAL
Status: SHIPPED | COMMUNITY
Start: 2020-07-13 | End: 2021-08-05

## 2021-05-04 ASSESSMENT — MIFFLIN-ST. JEOR: SCORE: 989.84

## 2021-05-05 ENCOUNTER — COMMUNICATION - HEALTHEAST (OUTPATIENT)
Dept: FAMILY MEDICINE | Facility: CLINIC | Age: 76
End: 2021-05-05

## 2021-05-05 ENCOUNTER — COMMUNICATION - HEALTHEAST (OUTPATIENT)
Dept: INTERNAL MEDICINE | Facility: CLINIC | Age: 76
End: 2021-05-05

## 2021-05-05 DIAGNOSIS — I10 ESSENTIAL HYPERTENSION: ICD-10-CM

## 2021-05-05 DIAGNOSIS — J44.9 CHRONIC OBSTRUCTIVE PULMONARY DISEASE, UNSPECIFIED COPD TYPE (H): ICD-10-CM

## 2021-05-06 RX ORDER — IPRATROPIUM BROMIDE 17 UG/1
AEROSOL, METERED RESPIRATORY (INHALATION)
Qty: 1 INHALER | Refills: 3 | Status: SHIPPED | OUTPATIENT
Start: 2021-05-06 | End: 2022-03-15

## 2021-05-07 ENCOUNTER — RECORDS - HEALTHEAST (OUTPATIENT)
Dept: ADMINISTRATIVE | Facility: CLINIC | Age: 76
End: 2021-05-07

## 2021-05-13 ENCOUNTER — RECORDS - HEALTHEAST (OUTPATIENT)
Dept: ADMINISTRATIVE | Facility: OTHER | Age: 76
End: 2021-05-13

## 2021-05-18 ENCOUNTER — RECORDS - HEALTHEAST (OUTPATIENT)
Dept: ADMINISTRATIVE | Facility: CLINIC | Age: 76
End: 2021-05-18

## 2021-05-18 ENCOUNTER — RECORDS - HEALTHEAST (OUTPATIENT)
Dept: ADMINISTRATIVE | Facility: OTHER | Age: 76
End: 2021-05-18

## 2021-05-18 ENCOUNTER — RECORDS - HEALTHEAST (OUTPATIENT)
Dept: CARDIOLOGY | Facility: CLINIC | Age: 76
End: 2021-05-18

## 2021-05-19 ENCOUNTER — COMMUNICATION - HEALTHEAST (OUTPATIENT)
Dept: FAMILY MEDICINE | Facility: CLINIC | Age: 76
End: 2021-05-19

## 2021-05-19 ENCOUNTER — OFFICE VISIT - HEALTHEAST (OUTPATIENT)
Dept: FAMILY MEDICINE | Facility: CLINIC | Age: 76
End: 2021-05-19

## 2021-05-19 ENCOUNTER — RECORDS - HEALTHEAST (OUTPATIENT)
Dept: GENERAL RADIOLOGY | Facility: CLINIC | Age: 76
End: 2021-05-19

## 2021-05-19 DIAGNOSIS — Z87.440 PERSONAL HISTORY OF URINARY TRACT INFECTION: ICD-10-CM

## 2021-05-19 DIAGNOSIS — R05.9 COUGH: ICD-10-CM

## 2021-05-19 DIAGNOSIS — I10 ESSENTIAL HYPERTENSION: ICD-10-CM

## 2021-05-19 DIAGNOSIS — J44.9 CHRONIC OBSTRUCTIVE PULMONARY DISEASE, UNSPECIFIED COPD TYPE (H): ICD-10-CM

## 2021-05-19 LAB
ANION GAP SERPL CALCULATED.3IONS-SCNC: 18 MMOL/L (ref 5–18)
BUN SERPL-MCNC: 35 MG/DL (ref 8–28)
CALCIUM SERPL-MCNC: 9.9 MG/DL (ref 8.5–10.5)
CHLORIDE BLD-SCNC: 101 MMOL/L (ref 98–107)
CO2 SERPL-SCNC: 19 MMOL/L (ref 22–31)
CREAT SERPL-MCNC: 2.2 MG/DL (ref 0.6–1.1)
GFR SERPL CREATININE-BSD FRML MDRD: 22 ML/MIN/1.73M2
GLUCOSE BLD-MCNC: 92 MG/DL (ref 70–125)
POTASSIUM BLD-SCNC: 4.3 MMOL/L (ref 3.5–5)
SODIUM SERPL-SCNC: 138 MMOL/L (ref 136–145)

## 2021-05-19 RX ORDER — PEAK FLOW METER
EACH MISCELLANEOUS
Status: SHIPPED | COMMUNITY
Start: 2021-05-03 | End: 2021-08-05

## 2021-05-19 RX ORDER — PREDNISONE 20 MG/1
40 TABLET ORAL DAILY
Qty: 10 TABLET | Refills: 0 | Status: SHIPPED | OUTPATIENT
Start: 2021-05-19 | End: 2021-07-29

## 2021-05-19 ASSESSMENT — MIFFLIN-ST. JEOR: SCORE: 977.57

## 2021-05-20 ENCOUNTER — COMMUNICATION - HEALTHEAST (OUTPATIENT)
Dept: INTERNAL MEDICINE | Facility: CLINIC | Age: 76
End: 2021-05-20

## 2021-05-24 ENCOUNTER — COMMUNICATION - HEALTHEAST (OUTPATIENT)
Dept: INTERNAL MEDICINE | Facility: CLINIC | Age: 76
End: 2021-05-24

## 2021-05-24 ENCOUNTER — RECORDS - HEALTHEAST (OUTPATIENT)
Dept: ADMINISTRATIVE | Facility: OTHER | Age: 76
End: 2021-05-24

## 2021-05-24 ENCOUNTER — RECORDS - HEALTHEAST (OUTPATIENT)
Dept: ADMINISTRATIVE | Facility: CLINIC | Age: 76
End: 2021-05-24

## 2021-05-25 ENCOUNTER — OFFICE VISIT - HEALTHEAST (OUTPATIENT)
Dept: CARDIOLOGY | Facility: CLINIC | Age: 76
End: 2021-05-25

## 2021-05-25 DIAGNOSIS — J43.2 CENTRILOBULAR EMPHYSEMA (H): ICD-10-CM

## 2021-05-25 DIAGNOSIS — N18.4 CKD (CHRONIC KIDNEY DISEASE) STAGE 4, GFR 15-29 ML/MIN (H): ICD-10-CM

## 2021-05-25 DIAGNOSIS — I27.29 PULMONARY HYPERTENSIVE VENOUS DISEASE (H): ICD-10-CM

## 2021-05-25 DIAGNOSIS — I50.22 CHRONIC SYSTOLIC CONGESTIVE HEART FAILURE (H): ICD-10-CM

## 2021-05-25 DIAGNOSIS — I42.8 NONISCHEMIC CARDIOMYOPATHY (H): ICD-10-CM

## 2021-05-25 LAB
ANION GAP SERPL CALCULATED.3IONS-SCNC: 12 MMOL/L (ref 5–18)
BUN SERPL-MCNC: 34 MG/DL (ref 8–28)
CALCIUM SERPL-MCNC: 9.9 MG/DL (ref 8.5–10.5)
CHLORIDE BLD-SCNC: 101 MMOL/L (ref 98–107)
CO2 SERPL-SCNC: 26 MMOL/L (ref 22–31)
CREAT SERPL-MCNC: 1.61 MG/DL (ref 0.6–1.1)
GFR SERPL CREATININE-BSD FRML MDRD: 31 ML/MIN/1.73M2
GLUCOSE BLD-MCNC: 94 MG/DL (ref 70–125)
POTASSIUM BLD-SCNC: 2.9 MMOL/L (ref 3.5–5)
SODIUM SERPL-SCNC: 139 MMOL/L (ref 136–145)

## 2021-05-26 ENCOUNTER — COMMUNICATION - HEALTHEAST (OUTPATIENT)
Dept: ADMINISTRATIVE | Facility: CLINIC | Age: 76
End: 2021-05-26

## 2021-05-26 ENCOUNTER — RECORDS - HEALTHEAST (OUTPATIENT)
Dept: ADMINISTRATIVE | Facility: CLINIC | Age: 76
End: 2021-05-26

## 2021-05-26 ASSESSMENT — PATIENT HEALTH QUESTIONNAIRE - PHQ9: SUM OF ALL RESPONSES TO PHQ QUESTIONS 1-9: 0

## 2021-05-27 ENCOUNTER — RECORDS - HEALTHEAST (OUTPATIENT)
Dept: ADMINISTRATIVE | Facility: CLINIC | Age: 76
End: 2021-05-27

## 2021-05-27 VITALS — SYSTOLIC BLOOD PRESSURE: 120 MMHG | HEART RATE: 66 BPM | DIASTOLIC BLOOD PRESSURE: 70 MMHG | RESPIRATION RATE: 16 BRPM

## 2021-05-27 VITALS
SYSTOLIC BLOOD PRESSURE: 120 MMHG | OXYGEN SATURATION: 94 % | BODY MASS INDEX: 25.03 KG/M2 | DIASTOLIC BLOOD PRESSURE: 72 MMHG | WEIGHT: 127.5 LBS | HEIGHT: 60 IN | HEART RATE: 58 BPM

## 2021-05-27 VITALS — SYSTOLIC BLOOD PRESSURE: 124 MMHG | HEART RATE: 62 BPM | OXYGEN SATURATION: 93 % | DIASTOLIC BLOOD PRESSURE: 80 MMHG

## 2021-05-27 VITALS
OXYGEN SATURATION: 95 % | HEART RATE: 72 BPM | WEIGHT: 127 LBS | HEIGHT: 59 IN | SYSTOLIC BLOOD PRESSURE: 102 MMHG | BODY MASS INDEX: 25.6 KG/M2 | DIASTOLIC BLOOD PRESSURE: 68 MMHG

## 2021-05-27 VITALS — TEMPERATURE: 97.8 F | OXYGEN SATURATION: 99 % | DIASTOLIC BLOOD PRESSURE: 68 MMHG | SYSTOLIC BLOOD PRESSURE: 90 MMHG

## 2021-05-27 VITALS — HEART RATE: 50 BPM | SYSTOLIC BLOOD PRESSURE: 144 MMHG | DIASTOLIC BLOOD PRESSURE: 78 MMHG

## 2021-05-27 ASSESSMENT — PATIENT HEALTH QUESTIONNAIRE - PHQ9
SUM OF ALL RESPONSES TO PHQ QUESTIONS 1-9: 0
SUM OF ALL RESPONSES TO PHQ QUESTIONS 1-9: 0

## 2021-05-27 NOTE — TELEPHONE ENCOUNTER
Left message for patient to call back for DEXA scan results.    DEXA scan shows osteoporosis with a high risk for fracture. I do not think you are benefiting from the Fosamax (Alendronate), and I recommend taking a break from this medication due to how long you have been on it.      Recommend meeting with Dr. Shane or the osteoporosis clinic to discuss other option.    Please let me know if this is something you would like to do.    Collette Torres NP

## 2021-05-27 NOTE — TELEPHONE ENCOUNTER
Spoke with Stephanie . She is feeling well today and in the green zone. She is able to get and take her medications and is compliant in taking them.  Reviewed COPD Action Plan.  She had no problems or questions for me today.

## 2021-05-27 NOTE — PATIENT INSTRUCTIONS - HE
1. Continue current medications  2. Check blood work today and we will call with results  3. Follow up in 6 months

## 2021-05-27 NOTE — TELEPHONE ENCOUNTER
Dr Shane & Team    Patient had an original appt with you scheduled for 06.11.2019, which I noticed states provider requested time away.    Any plan to work in the consults that has waited to see you? Or okay to wait until your 1st available Consult of Oct?    Please review and advise on scheduling.

## 2021-05-28 ENCOUNTER — RECORDS - HEALTHEAST (OUTPATIENT)
Dept: ADMINISTRATIVE | Facility: CLINIC | Age: 76
End: 2021-05-28

## 2021-05-28 ASSESSMENT — ASTHMA QUESTIONNAIRES
ACT_TOTALSCORE: 11
ACT_TOTALSCORE: 14

## 2021-05-28 NOTE — PATIENT INSTRUCTIONS - HE
Irene León,    It was a pleasure to see you today at the [facility].     My recommendations after this visit include:  - Please follow up with Dr Chilel in October   - Please follow up with Samantha Herrera as needed   - No changes to your medications    Samantha Herrera CNP    What is the [facility]?     The [facility] is a heart failure specialty clinic within [facility] the [facility].  You will work with your cardiologist, nurse practitioner, and nurses to carefully adjust medications and learn how to live with heart failure.  The Heart Failure Program will help you:      Better understand your chronic heart condition    Feel better and avoid hospital stays    Monitoring for Symptoms      Call the Heart Failure Phone Line ([phone]) if you have any of these symptoms:     Increased shortness of breath/shortness of breath at rest    Waking up at night with difficulty breathing    Unable to lie down for sleep due to symptoms or needing to sit upright for sleep    Weight gain of 2 pounds a day for 2 days in a row OR 5 pounds in 1 week    Increased swelling in your ankles or legs    Dizziness or lightheadedness    Medications       Take your medications as prescribed    Bring all your medications in their original bottles to every appointment    Avoid non-steroidal anti-inflammatory medications (Advil, Aleve, Ibuprofen, Naprosyn, Naproxen, Celebrex)    Do not stop taking your medications or begin taking over-the-counter or herbal medications without first talking to your doctor or nurse practitioner    Diet and Lifestyle       Limit sodium/salt to 2000 mg daily   o Read food labels for sodium content  o Do not add salt when cooking or add salt at the table    Weigh yourself every day and record in your daily weight log   o Call if you gain 2 pounds a day for 2 days in a row OR 5 pounds in 1 week  o Bring daily weight log to every appointment    Stay  active, pace yourself, listen to your body, and rest when tired    Elevate your legs if they are swollen. Ask about using compression/support stockings    Stop smoking    Lose weight if you are overweight    Avoid drinking alcohol or limit amount    Stay updated on your immunizations including flu and pneumonia vaccines

## 2021-05-29 NOTE — PROGRESS NOTES
[facility] Progress Note    Patient: Irene León  MRN: [id number]  Date of Service: 6/13/2019      Reason for Visit    Chief Complaint   Patient presents with     HE Cancer     Breast Cancer       Assessment and Plan  Cancer Staging  Malignant neoplasm of central portion of left female breast (H)  Staging form: Breast, AJCC 7th Edition  - Pathologic stage from 7/26/2017: Stage IA (T1b, N0, cM0) - Signed by Devon Suarez MD on 8/1/2017  ER Status: Positive  DE Status: Positive  HER2 Status: Negative      ECOG Performance   ECOG Performance Status: 1     Distress Assessment  Distress Assessment Score: 8(results today)    Pain  Currently in Pain: Yes  Pain Score (Initial OR Reassessment): 4  Location: left shoulder    #. Left breast cancer.     Stage IA (pT1b, pN0 (sn),cM0) invasive ductal carcinoma of the left breast, grade 1, associated DCIS, ER/DE positive, HER-2 negative, s/p left breast lumpectomy and left axillary sentinel lymph node biopsy. Right breast atypical ductal hyperplasia s/p right breast excisional biopsy on 6/20/2017.  She has several comorbidities including nonischemic cardiomyopathy (EF 40% in 3/17), osteoporosis on treatment with oral bisphosphonate, factor V Leiden mutation (details unknown). Started Tamoxifen in 12/2017.   There is no clinical or mammographic evidence of breast cancer recurrence.  She is okay to continue tamoxifen at this point but she is still struggling with mild to moderate symptoms of hot flashes.  She is currently on venlafaxine 150 mg daily.  Because of her hot flashes are mainly in the evening and nighttime, I recommended her to restart gabapentin 300 mg before bedtime.  She agreed.    Follow-up clinical exam in 6 months.   Continue surveillance mammogram in May 2020.     #.  Pleural and pulmonary nodules   Follow-up CT scan showed stable pulmonary nodule along the minor fissure of 10 x 8 mm.  Stable subpleural nodule of left lower lobe  measuring 4 mm.  These are overall stable compared to the CT scan from 12/30/2018.     I reviewed the CT scan result in detail with the patient and inform her that likely benign in nature due to overall stability.  She was very glad to hear that.  We will plan to obtain no warm or CT scan follow-up in 1 year.      #. Right breast skin dimpling at prior excisional biopsy site   Continue to follow clinically.      #.  Hot flushes   Venlafaxine at 150 mg daily.  Restart gabapentin 300 mg at bedtime.  She is advised to call me sooner if her hot flushes are not manageable.     #.  Mild hypoxia and chronic cough.   She is closely follow with pulmonologist at [facility].    #. Hypertension, controlled.     #.  Osteoporosis  #.  COPD  #.  Idiopathic cardiomyopathy- LVEF 40% in October 2018, no change from prior.     Problem List    No diagnosis found.   ______________________________________________________________________________    Diagnosis  6/20/17  Stage IA (pT1b, pN0(sn), cM0) invasive ductal carcinoma of the left breast  - ER (high positive, 98%), MS (high positive, 97%) HER2 (negative, score of 1+ by IHC)  - Leena grade 1  - Tumor size: <1 cm  - Margin-negative on final margin with reexcision for invasive carcinoma but close margin for DCIS of 0.2 cm from new medial margin.    - 1 sentinel lymph node removed, negative for carcinoma  - associated DCIS  - Right breast atypical ductal hyperplasia.    - Oncotype DX recurrence score 6 : 10 year risk of recurrence with 5 year of tamoxifen is 5%.    Therapy to date:  6/20/17 -right breast excisional biopsy AND left breast lumpectomy, left axillary sentinel lymph node biopsy  6/30/17-reexcision lumpectomy of the left breast  9/8/17-completed adjuvant radiation to the left breast 4256 cGy/16  12/6/17-initiated adjuvant endocrine therapy with tamoxifen.    History of Present Illness    Stephanie presents today accompanied by her , Al.  She continues to have congested  cough and unable to get sputum up.  Upon arrival to our clinic, her oxygen saturation was 88%, but improved to 92% upon rest.  She has been following with l pulmonologist.  No other new concern.  She felt last week from sudden charley horse in her leg which dropped her down.  She landed on the left shoulder.  Pain is improving.  No concern about her breast today.    Pain Status  Currently in Pain: Yes    Review of Systems    Constitutional  Constitutional (WDL): Exceptions to WDL  Fatigue: None  Fever: None  Chills: None  Weight Gain: None  Weight Loss: None  Neurosensory  Neurosensory (WDL): All neurosensory elements are within defined limits  Eye   Eye Disorder (WDL): Assessment not pertinent to visit  Ear  Ear Disorder (WDL): Assessment not pertinent to visit  Cardiovascular  Cardiovascular (WDL): Exceptions to WDL  Palpitations: None  Edema: Yes  Edema Limbs: 5 - 10% inter-limb discrepancy in volume or circumference at point of greatest visible difference, swelling or obscuration of anatomic architecture on close inspection(bilat LE)  Phlebitis: None  Superficial thrombophlebitis: None  Pulmonary  Respiratory (WDL): Exceptions to WDL  Cough: Mild symptoms, nonprescription intervention indicated(productive sounding but unable to get sputum up)  Dyspnea: Shortness of breath with moderate exertion  Hypoxia: None  Gastrointestinal  Gastrointestinal (WDL): All gastrointestinal elements are within defined limits  Genitourinary  Genitourinary (WDL): Exceptions to WDL  Urinary Frequency: Present  Urinary Retention: None  Urinary Tract Pain: None  Lymphatic  Lymph (WDL): All lymph disorder elements are within defined limits  Musculoskeletal and Connective Tissue  Musculoskeletal and Connetive Tissue Disorders (WDL): Exceptions to WDL  Arthralgia: Mild pain(left hip, left shoulder)  Bone Pain: None  Muscle Weakness : Symptomatic, perceived by patient but not evident on physical exam  Myalgia: Mild  pain(legs)  Integumentary  Integumentary (WDL): Exceptions to WDL(bruises easily)  Alopecia: None  Rash Maculo-Papular: None  Pruritus: None  Urticaria: None  Palmar-Plantar Erythrodysesthesia Syndrome: None  Patient Coping  Patient Coping: Accepting;Open/discussion  Distress Assessment  Distress Assessment Score: 8(results today)  Accompanied by  Accompanied by: Alone  Oral Chemo Adherence       Past History  Past Medical History:   Diagnosis Date     Arrhythmia      Breast cancer (H) 2017     CHF (congestive heart failure) (H)      COPD (chronic obstructive pulmonary disease) (H)      Coronary artery disease      GERD (gastroesophageal reflux disease)      Hyperlipidemia      Hypertension      Idiopathic cardiomyopathy (H)        Physical Exam    Recent Vitals 6/13/2019   Height -   Weight -   BSA (m2) -   BP -   Pulse -   Temp -   Temp src -   SpO2 92   Some recent data might be hidden     General: alert, awake, not in acute distress  HEENT: Head: Normal, normocephalic, atraumatic.  Eye: Normal external eye, conjunctiva, lids cornea, CATINA.  Hoarseness of voice noted.  Ears:  Non-tender.  Nose: Normal external nose, mucus membranes and septum.  Pharynx: Dental Hygiene adequate. Normal buccal mucosa. Normal pharynx.  Neck / Thyroid: Supple, no masses, nodes, nodules or enlargement.  Lymphatics: No abnormally enlarged lymph nodes.  Chest: Normal chest wall and respirations. Clear to auscultation.  Breasts: No palpable masses bilaterally.  Postsurgical scar with skin dimpling in the lower quadrant of the right breast.  Heart: S1 S2 RRR, no murmur.   Abdomen: abdomen is soft without significant tenderness, masses, organomegaly or guarding  Extremities: normal strength, tone, and muscle mass  Skin: normal. no rash or abnormalities  CNS: non focal.      Lab Results    Recent Results (from the past 168 hour(s))   Comprehensive Metabolic Panel   Result Value Ref Range    Sodium 142 136 - 145 mmol/L    Potassium 4.1 3.5 -  5.0 mmol/L    Chloride 107 98 - 107 mmol/L    CO2 24 22 - 31 mmol/L    Anion Gap, Calculation 11 5 - 18 mmol/L    Glucose 83 70 - 125 mg/dL    BUN 22 8 - 28 mg/dL    Creatinine 1.33 (H) 0.60 - 1.10 mg/dL    GFR MDRD Af Amer 47 (L) >60 mL/min/1.73m2    GFR MDRD Non Af Amer 39 (L) >60 mL/min/1.73m2    Bilirubin, Total 0.2 0.0 - 1.0 mg/dL    Calcium 9.5 8.5 - 10.5 mg/dL    Protein, Total 6.5 6.0 - 8.0 g/dL    Albumin 3.3 (L) 3.5 - 5.0 g/dL    Alkaline Phosphatase 53 45 - 120 U/L    AST 30 0 - 40 U/L    ALT 24 0 - 45 U/L   HM1 (CBC with Diff)   Result Value Ref Range    WBC 9.9 4.0 - 11.0 thou/uL    RBC 4.78 3.80 - 5.40 mill/uL    Hemoglobin 13.8 12.0 - 16.0 g/dL    Hematocrit 42.9 35.0 - 47.0 %    MCV 90 80 - 100 fL    MCH 28.9 27.0 - 34.0 pg    MCHC 32.2 32.0 - 36.0 g/dL    RDW 13.9 11.0 - 14.5 %    Platelets 213 140 - 440 thou/uL    MPV 9.0 8.5 - 12.5 fL    Neutrophils % 72 (H) 50 - 70 %    Lymphocytes % 16 (L) 20 - 40 %    Monocytes % 10 2 - 10 %    Eosinophils % 1 0 - 6 %    Basophils % 0 0 - 2 %    Neutrophils Absolute 7.0 2.0 - 7.7 thou/uL    Lymphocytes Absolute 1.6 0.8 - 4.4 thou/uL    Monocytes Absolute 1.0 (H) 0.0 - 0.9 thou/uL    Eosinophils Absolute 0.1 0.0 - 0.4 thou/uL    Basophils Absolute 0.0 0.0 - 0.2 thou/uL       Imaging    Ct Chest Abdomen Pelvis With Oral With Iv Cont    Result Date: 6/11/2019  EXAM: CT CHEST ABDOMEN PELVIS W ORAL W IV CONTRAST LOCATION: [facility] DATE/TIME: 6/11/2019 2:11 PM INDICATION: lung nodules, h/o breast cancer COMPARISON: 12/30/2018. TECHNIQUE: Helical thin-section CT scan of the chest, abdomen, and pelvis was performed before and after injection of IV contrast. Multiplanar reformats were obtained. Dose reduction techniques were used. CONTRAST: Iohexol (Omni) 75mL FINDINGS: CHEST: Stable pleural nodule along the minor fissure image 81 measures 10 x 8 mm. Stable subpleural nodule left lower lobe measures 4 mm on image 116. Given their appearance and stability these  are most likely benign. Mild generalized emphysema. Some minimal scattered fibrosis in the lung bases is stable. Some stable tiny scattered mediastinal nodes with no boom lymphadenopathy.  ABDOMEN: No significant findings in the liver, spleen, kidneys, pancreas, and adrenal glands. No lymphadenopathy. PELVIS: Negative. No masses or adenopathy. MUSCULOSKELETAL: Left hip arthroplasty. No concerning bone lesion. Old right rib fractures.     CONCLUSION: 1.  Stable benign-appearing lung nodules since 12/30/2018. Recommend additional follow-up in one year. 2.  No significant new findings in the chest, abdomen, or pelvis.      Signed by: Devon Suarez MD

## 2021-05-29 NOTE — PROGRESS NOTES
Patient today for follow-up visit, labs and CT results with Dr. Suarez for breast CA/BLANK Nogueira RN

## 2021-05-29 NOTE — TELEPHONE ENCOUNTER
Spoke with Stephanie, she is doing well, checking her action plan, staying a routine and she says this really has helped her breathing. No questions today, reminded her to call if she has any before we call next month.  Safia Davila, LRT, COPD Educator

## 2021-05-30 ENCOUNTER — RECORDS - HEALTHEAST (OUTPATIENT)
Dept: ADMINISTRATIVE | Facility: CLINIC | Age: 76
End: 2021-05-30

## 2021-05-30 VITALS — WEIGHT: 152 LBS | HEIGHT: 60 IN | BODY MASS INDEX: 29.84 KG/M2

## 2021-05-30 VITALS — BODY MASS INDEX: 29.84 KG/M2 | WEIGHT: 152 LBS | HEIGHT: 60 IN

## 2021-05-30 NOTE — PROGRESS NOTES
Clinic Note    Assessment:     Assessment and Plan:  1. Cough  She has evidence of expiratory wheezes on exam.  She did feel like the prednisone burst two weeks ago improved her symptoms.  I am going to have her do a 12-day prednisone taper.  Doxycycline for 10 days.  Codeine cough medication to help her sleep at night.  I encouraged her to schedule her routine six-month follow-up appointment with her pulmonologist.  She will come back and see me in 2 weeks for recheck.    - predniSONE (DELTASONE) 10 mg tablet; 4 tabs X 3 days; 3 tabs X 3 days; 2 tabs X 3 days; 1 tab X 3 days.  Dispense: 30 tablet; Refill: 0  - doxycycline (VIBRAMYCIN) 100 MG capsule; Take 1 capsule (100 mg total) by mouth 2 (two) times a day for 10 days.  Dispense: 20 capsule; Refill: 0  - codeine-guaiFENesin (GUAIFENESIN AC)  mg/5 mL liquid; Take 5 mL by mouth 3 (three) times a day as needed for cough.  Dispense: 236 mL; Refill: 0    2. Essential hypertension  Her blood pressure is 86/59 today.  She is having dizziness with positional changes.  She will reduce her losartan to 50 mg daily and come back to see me in 2 weeks for recheck.    - losartan (COZAAR) 50 MG tablet; Take 1 tablet (50 mg total) by mouth daily.  Dispense: 90 tablet; Refill: 0       Patient Instructions   Your blood pressure is too low today.  Reduce losartan dose from 100 mg to 50 mg.  Follow-up with me in 2 weeks for blood pressure check.    Doxycycline antibiotic sent into pharmacy.  Take 1 tablet every 12 hours for next 10 days.    Codeine cough medication sent into pharmacy.  Take at night to start.  Otherwise, every 8 hours as needed.    We will restart prednisone taper.  Take 4 tablets for 3 days, then 3 tablets for 3 days, then 2 tablets for 3 days, then 1 tablet for 3 days.    Take your prednisone with food in the morning.  You cannot use Aleve or ibuprofen while on this medication.    Follow-up with me in 2 weeks for recheck of blood pressure and cough.  If cough  persists, we may need to trial a complete discontinuance of your losartan.    Return in about 2 weeks (around 2019).         Subjective:      Patient comes to the clinic today for follow-up of a cough.    She was seen at the urgency room in [place] approximately 2 weeks ago for the same cough.  She had a chest x-ray which was negative for infiltrate.    She was given a prescription for Tessalon and prednisone and told to follow-up with her PCP.    She has a somewhat complicated pulmonary history.  History of COPD, was using Advair switched to Trelegy by primary pulmonologist this past March.    She has seen the ENT and has had endoscopy studies.  She was recently referred to speech therapy but does not plan on following through with that referral.    She has chronic allergies and sinus disease, positive sensitivity to dust mites, IgE  has been elevated at 424.    On Protonix for GERD.    Has had issues with lisinopril related cough, switch to losartan.    Today, she tells me that she has essentially been coughing since February.  She is developed some chest soreness as a result of her coughing.    No fevers.  No sinus pain or pressure.    She feels like she has phlegm in the back of her throat.  Occasional tickle sensation in the back of her throat.  Occasional wheezing.    She does not feel ill or infectious.  No hemoptysis.    The following portions of the patient's history were reviewed and updated as appropriate: Allergies, medications, problem list, prior note.     Review of Systems:    Review is otherwise negative except for what is mentioned above.     Social Hx:    Social History     Tobacco Use   Smoking Status Former Smoker     Last attempt to quit: 10/28/2007     Years since quittin.7   Smokeless Tobacco Former User     Quit date: 2004         Objective:     Vitals:    19 1301   BP: (!) 86/59   Pulse: (!) 49   Weight: 150 lb 3 oz (68.1 kg)       Exam:    General: No apparent distress.  Calm. Alert and Oriented X3. Pt behavior is appropriate.  Head:Atraumatic. Normocephalic, non-tender to palpation  Neck: Supple. No JVD. Full ROM. No adenopathy  Eyes: PERRL, No discharge. No strabismus. No nystagmus.  Ears: TMs pearly gray with landmarks visible.   Nose/Mouth/Throat: Patent nares, no oral lesions, pharynx clear and without exudate. Uvula mid-line. Nasal septum mid-line. Clear turbinates.   Lymph: No axillar or cervical adenopathy.   Chest/Lungs: Normal chest wall, expiratory wheezes in bilateral lower lung fields, normal respiratory effort and rate.   Heart/Pulses: Regular rate and rhythm, strong and equal radial pulses, no murmurs. Capillary refill <2 seconds. No edema.   Abdomen: Soft, no palpable masses. No hepatosplenomegaly, no tenderness with palpation noted. Bowel sounds active in all quadrants. No increased tympany.   Genitalia: Not examined.   Musculoskeletal: No CVA tenderness with palpation. Good ROM with extremities.   Neurologic: Interactive, alert, no focal findings, CNs intact.   Skin: Warm, dry. Normal hair pattern. Free of lesions. Normal skin turgor.       Patient Active Problem List   Diagnosis     Leg pain, left     Femur fracture (H)     Hypertension     Pulmonary emphysema, unspecified emphysema type (H)     Hyperlipidemia     Bladder incontinence     Cardiomyopathy, idiopathic (H)     Depression with anxiety     Family history of blood disease     GERD (gastroesophageal reflux disease)     Post menopausal syndrome     OP (osteoporosis)     Osteoarthritis of hip     Insomnia     Dyspnea     Malignant neoplasm of central portion of left female breast (H)     Pleural nodules     Hot flashes due to tamoxifen     COPD exacerbation (H)     Herpes zoster without complication     Chest wall pain     Benign essential hypertension     Dyslipidemia     Acute respiratory failure with hypoxia (H)     Factor 5 Leiden mutation, heterozygous (H)     CKD (chronic kidney disease) stage 3, GFR  30-59 ml/min (H)     Centrilobular emphysema (H)     Anisocoria     Herniated intervertebral disc     Hot flashes, menopausal     OAB (overactive bladder)     Physical deconditioning     Pulsatile tinnitus of both ears     Chest pain     Acute hypoxemic respiratory failure (H)     S/P hip replacement, left     Hip pain, left     Current Outpatient Medications   Medication Sig Dispense Refill     acetaminophen (TYLENOL) 500 MG tablet Take 1-2 tablets (500-1,000 mg total) by mouth every 6 (six) hours as needed.  0     ascorbic acid, vitamin C, (VITAMIN C) 1000 MG tablet Take 1,000 mg by mouth daily.       aspirin 81 MG EC tablet Take 81 mg by mouth daily.       azelastine (ASTELIN) 137 mcg (0.1 %) nasal spray 2 sprays into each nostril daily.       benzonatate (TESSALON) 200 MG capsule Take 1 capsule by mouth 3 (three) times a day.  2     cetirizine (ZYRTEC) 10 MG tablet Take 10 mg by mouth daily.              chlorhexidine (PERIDEX) 0.12 % solution Apply 15 mL to the mouth or throat at bedtime.              cholecalciferol, vitamin D3, 1,000 unit tablet Take 1,000 Units by mouth daily.       cyanocobalamin, vitamin B-12, (VITAMIN B-12 ORAL) Take 1,000 mcg by mouth daily.              fluticasone (FLONASE) 50 mcg/actuation nasal spray Apply 1 spray into each nostril at bedtime.       fluticasone-salmeterol (ADVAIR) 500-50 mcg/dose DISKUS Inhale 1 puff 2 (two) times a day.       furosemide (LASIX) 20 MG tablet Take 10 mg by mouth daily.              gabapentin (NEURONTIN) 300 MG capsule Take 1 capsule (300 mg total) by mouth 2 (two) times a day. 180 capsule 1     ipratropium (ATROVENT HFA) 17 mcg/actuation inhaler Inhale 2 puffs every 6 (six) hours. 3 Inhaler 3     ipratropium-albuterol (DUO-NEB) 0.5-2.5 mg/3 mL nebulizer 1 neb 4 times a day until seen by primary and then as needed or as directed 60 vial 1     lidocaine (LIDODERM) 5 % Place 1 patch on the skin daily. To left hip 30 patch 1     meclizine (ANTIVERT) 25  mg tablet Take 1 tablet (25 mg total) by mouth 3 (three) times a day as needed for dizziness. 30 tablet 0     metoprolol succinate (TOPROL-XL) 100 MG 24 hr tablet Take 1 tablet (100 mg total) by mouth at bedtime. 90 tablet 3     MYRBETRIQ 25 mg Tb24 ER tablet Take 25 mg by mouth daily.              pantoprazole (PROTONIX) 40 MG tablet Take 40 mg by mouth daily as needed.              PROVENTIL HFA 90 mcg/actuation inhaler Inhale 1 puff every 6 (six) hours as needed.        ranitidine (ZANTAC) 150 MG tablet Take 150 mg by mouth daily.       simvastatin (ZOCOR) 40 MG tablet Take 40 mg by mouth bedtime.       solifenacin (VESICARE) 5 MG tablet Take 1 tablet (5 mg total) by mouth at bedtime. 90 tablet 0     tamoxifen (NOLVADEX) 20 MG tablet Take 20 mg by mouth at bedtime.              traZODone (DESYREL) 50 MG tablet Take 1 tablet (50 mg total) by mouth at bedtime as needed. 90 tablet 0     venlafaxine (EFFEXOR XR) 150 MG 24 hr capsule Take 1 capsule (150 mg total) by mouth daily. 90 capsule 1     codeine-guaiFENesin (GUAIFENESIN AC)  mg/5 mL liquid Take 5 mL by mouth 3 (three) times a day as needed for cough. 236 mL 0     doxycycline (VIBRAMYCIN) 100 MG capsule Take 1 capsule (100 mg total) by mouth 2 (two) times a day for 10 days. 20 capsule 0     losartan (COZAAR) 50 MG tablet Take 1 tablet (50 mg total) by mouth daily. 90 tablet 0     predniSONE (DELTASONE) 10 mg tablet 4 tabs X 3 days; 3 tabs X 3 days; 2 tabs X 3 days; 1 tab X 3 days. 30 tablet 0     No current facility-administered medications for this visit.        I spent 20 minutes with patient face to face, of which >50% was counseling regarding the above plan       Aakash Flores (Rob), CNP    7/30/2019

## 2021-05-30 NOTE — PROGRESS NOTES
[facility] clinic Follow Up Note    Irene León   74 y.o. female    Date of Visit: 7/10/2019    Chief Complaint   Patient presents with     Establish Care     fasting     Subjective  Stephanie is here with .  Patient lives at home with .  Patient is here to establish care and review multiple medical issues.    She also had a new question on her osteoporosis treatment plan.    She also wanted to discuss her chronic left hip pain.  Patient had a left hip replacement 2015 with a redo surgery needed and femur fracture with bone graft.  She had extensive scar tissue and myofascial disruption of her hip.  She has gone through physical therapy in the past but is no longer doing her regular exercises.  She sits for long period of time in a recliner chair.  She has had reduced mobility.    She does take occasional ibuprofen but knows she should try to avoid that with her heart condition and medications.    She does not have lower extremity edema issues.  She has not had any recent falls.  She does not take other pain medication.  No radicular type pain in her leg.    Patient has an idiopathic systolic and diastolic CHF condition.  Patient wonders whether it may be a broken heart syndrome from acute stressful event of her father dying about 6 years ago, which was very traumatic at the time.    April 2019     I did review the cardiology notes from last October and this may.  Diagnosis class II CHF.  She has mild dyspnea on exertion.  Cardiac echo October 2018 with ejection fraction 40%.  Diastolic dysfunction seen with LVH.  Normal LA size.  No heart valve problems.  No shunt.    No history of chest pain or ischemic event.  2016 stress test reviewed, was negative.    She is on losartan 100 mg a day and Toprol- mg a day for heart failure.  Blood pressure is well tolerated, on the low side.  Denies orthostasis.  On Lasix just 10 mg a day without edema.    She does have COPD, quit smoking in  2007.  I did review the pulmonary note from March 2019.  Unstable inhalers.    She also has chronic sinusitis and postnasal drip type cough.  She is on Flonase.  She did have sinus surgery April 2018.  Last treated with steroids for COPD exacerbation this past March.    Patient denies sleep apnea.   denies history of snoring or significant daytime sleepiness.    Past history of heterozygous factor V Leiden mutation.  No history of DVT or pulmonary embolism.    She does have a history of occipital stroke, left medial occipital infarct.  I did review the MRI MRA from October 2018.  There was bilateral carotid artery stenosis and occlusion/high-grade stenosis at the junction of P2 and P3 segments of the left posterior cerebral artery.    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    She had a visual field cut, could not see traffic from one side of her vision in the past.  She was on Plavix but that was changed to aspirin in March.  No recurrent TIA or stroke symptoms.  She is followed by neurology.    Patient had invasive ductal cancer of the left breast June 2017 with lumpectomy and radiation.  I reviewed the oncology note from June 2019.  No evidence of recurrence.  Was a stage Ia estrogen scepter positive and her negative.  She has hot flashes on the tamoxifen which was started December 2017.  That is controlled with Effexor.  She did not tolerate the gabapentin because of sedation.  Also takes trazodone at night.    Negative mammogram May 2019    I reviewed the CT scan of the chest abdomen and pelvis from June 2019.  Benign stable nodules from December 2018.  One-year follow-up CT scan planned.  Normal liver no adenopathy.  No AAA.    Chronic cough is nonproductive no hemoptysis.  No swallowing difficulty or mouth sores.    Reflux controlled with Zantac in the evening.    Past history of osteoporosis on Fosamax for over 10 years.  Sacral fracture in 2012.    Reviewed the bone density scan  from March 2019 with a spine score of -2.5.  Femur score -2.7.  Moderate trabecular bone.    Patient does have some mild renal insufficiency with a creatinine stable at 1.3.  I reviewed the lab work from last month.  Potassium 4.1.  Blood sugar 83.  Normal hemogram.    Past history of shingles in December 2018    Overactive bladder, has used Myrbetriq and Vesicare in the past    PMHx:    Past Medical History:   Diagnosis Date     Arrhythmia      Breast cancer (H) 2017     CHF (congestive heart failure) (H)      COPD (chronic obstructive pulmonary disease) (H)      Coronary artery disease      GERD (gastroesophageal reflux disease)      Hyperlipidemia      Hypertension      Idiopathic cardiomyopathy (H)      PSHx:    Past Surgical History:   Procedure Laterality Date     BREAST BIOPSY Right 2017     BREAST LUMPECTOMY Left 06/2017    x2     CARDIAC CATHETERIZATION       CATARACT EXTRACTION Left 07/18/2017     CA OPEN TX FEMORAL FRACTURE DISTAL MED/LAT CONDYLE Left 10/28/2015    Procedure: OPEN REDUCTION INTERNAL FIXATION LEFT  PROXIMAL FEMUR PERIPROSTHETIC FRACTURE;  Surgeon: Yovanny Albarran MD;  Location: [facility];  Service: Orthopedics     REVISION TOTAL HIP ARTHROPLASTY Left      Immunizations:   Immunization History   Administered Date(s) Administered     Influenza Y7h2-21, 01/05/2010     Influenza high dose, seasonal 10/30/2014, 10/01/2015, 11/26/2016     Influenza, Seasonal, Inj PF IIV3 11/03/2003     Influenza, inj, historic,unspecified 10/01/2016, 10/01/2018     Influenza,C2M0-19,Pandemic,split,IM 01/05/2010     Influenza,seasonal, Inj IIV3 11/03/2003, 11/08/2006, 10/24/2007, 11/28/2008, 10/09/2009, 10/22/2010, 01/26/2012, 10/03/2012, 11/01/2013     Influenza,trivalent,im, 65+yrs 09/20/2017, 09/25/2018     Pneumo Conj 13-V (2010&after) 05/11/2015     Pneumo Polysac 23-V 11/24/1997, 11/21/2007, 05/17/2017     Td, adult adsorbed, PF 07/20/2004     Tdap 02/12/2016     ZOSTER, LIVE 02/18/2009,  "10/20/2014       ROS A comprehensive review of systems was performed and was otherwise negative    Medications, allergies, and problem list were reviewed and updated    Exam  /78   Pulse 75   Ht 5' 2\" (1.575 m)   Wt 150 lb 3 oz (68.1 kg)   LMP 11/02/1992   SpO2 94%   BMI 27.47 kg/m    Mildly overweight female.  Alert and oriented x3.  Pupils and irises equal and reactive.  No jaundice.  No JVD.  No carotid bruits.  Lungs with mildly reduced respiratory excursion but otherwise clear, no wheezing or crackles.  Heart is regular without murmur.  No ankle edema.  Abdomen is just mildly overweight, no hepatomegaly or tenderness.  Able to clamp on the exam table.  Significant fascial disruption of her left leg with scar tissue, reduced mobility of her left hip with unsteady gait.  She was still able to climb up on the exam table.    Assessment/Plan  1. Cardiomyopathy, idiopathic (H)  Unclear etiology.  She denies sleep apnea, but still possible.    Possible broken heart syndrome with stress cardiomyopathy with the death of her father a number of years ago.  Also traumatic death of her mother.    She does have a history of stroke with cerebrovascular disease, but has not had chest pain or cardiac event to suspect MI.  Negative stress test in 2016.    She appears well compensated currently and will continue on losartan, Toprol-XL and current low-dose furosemide.    Follow up with her cardiologist in October.    2. Pulmonary emphysema, unspecified emphysema type (H)  Six-month follow-up with pulmonary medicine to be due in September.  Continue current inhalers.  - ipratropium (ATROVENT HFA) 17 mcg/actuation inhaler; Inhale 2 puffs every 6 (six) hours.  Dispense: 3 Inhaler; Refill: 3    3. Pulmonary nodules  Stable.  Repeat CT scan June 2020    4. Hip pain, left  Chronic scar tissue from previous hip replacement and redo surgery with previous fracture.  Significant reduced range of motion and unsteady gait.    I " strongly encourage patient to resume a regular physical therapy daily activity.  She wanted to return to the [facility] physical therapist and will contact that clinic.  She was offered referral to our therapist but she declined.    Can use Tylenol as needed.  I did warn her on risk with the ibuprofen and try to avoid that.    I discussed sitting in a recliner chair for long periods of time will tend to stiffen up her leg and cause worsening pain.    I discussed consideration for a walker to help mobility.    5. S/P hip replacement, left  As above    6. Osteoporosis without current pathological fracture, unspecified osteoporosis type  Consideration for discontinuing Fosamax with drug holiday.  Unclear if starting Prolia would be indicated.    Refer to orthopedic specialist at our clinic, Dr. Shane, for consult on this issue.  She will continue on the Fosamax at this time.    I stressed the importance of daily walking.  - Ambulatory referral to Osteoporosis [facility]    7. Heterozygous factor V Leiden mutation (H)  Patient was warned of increased blood clot risk, especially with tamoxifen use.  No history of DVT or pulmonary embolism.    On aspirin prophylaxis with history of stroke.    8. History of stroke  Occipital stroke with cerebrovascular disease and carotid artery disease.  Medical management plan with aspirin and 40 mg of simvastatin.    Cholesterol is well controlled last November.  I did discuss option for higher dose statin, patient does not wish to consider that at this time.    9. History of invasive breast cancer  No evidence of recurrence.  Six-month follow-up with oncology will be due in December.  Yearly mammogram in June.    Hot flashes controlled with Effexor.  She did not tolerate gabapentin because of sedation.    Reflux controlled on Zantac.    Overactive bladder, has seen urology in the past, not discussed today.    Due for yearly ophthalmology in December, did review the note from  December and no macular degeneration or glaucoma.  Previous cataract surgery and possibly may need a YAG laser procedure.    Return in about 3 months (around 10/10/2019) for Recheck.   Patient Instructions   Continue current medications.    He will be contacted to set up an appointment with Dr. Shane, to discuss osteoporosis treatment plan.    Follow with me in 3 to 4 months for routine checkup.    Consider starting a physical therapy daily exercise routine for your chronic left hip pain.  Contact your orthopedic clinic for therapy orders.  Avoid taking ibuprofen, which can affect kidney function and blood pressure.    Pankaj Montanez MD  I spent a total time with patient of over 40 minutes and over 50% coord care.  Time all face to face.      Current Outpatient Medications   Medication Sig Dispense Refill     ipratropium (ATROVENT HFA) 17 mcg/actuation inhaler Inhale 2 puffs every 6 (six) hours. 3 Inhaler 3     acetaminophen (TYLENOL) 500 MG tablet Take 1-2 tablets (500-1,000 mg total) by mouth every 6 (six) hours as needed.  0     ascorbic acid, vitamin C, (VITAMIN C) 1000 MG tablet Take 1,000 mg by mouth daily.       aspirin 81 MG EC tablet Take 81 mg by mouth daily.       azelastine (ASTELIN) 137 mcg (0.1 %) nasal spray 2 sprays into each nostril daily.       benzonatate (TESSALON) 200 MG capsule Take 1 capsule by mouth 3 (three) times a day.  2     cetirizine (ZYRTEC) 10 MG tablet Take 10 mg by mouth daily.              chlorhexidine (PERIDEX) 0.12 % solution Apply 15 mL to the mouth or throat at bedtime.              cholecalciferol, vitamin D3, 1,000 unit tablet Take 1,000 Units by mouth daily.       cyanocobalamin, vitamin B-12, (VITAMIN B-12 ORAL) Take 1,000 mcg by mouth daily.              fluticasone (FLONASE) 50 mcg/actuation nasal spray Apply 1 spray into each nostril at bedtime.       fluticasone-salmeterol (ADVAIR) 500-50 mcg/dose DISKUS Inhale 1 puff 2 (two) times a day.       furosemide (LASIX)  20 MG tablet Take 10 mg by mouth daily.              gabapentin (NEURONTIN) 300 MG capsule Take 1 capsule (300 mg total) by mouth 2 (two) times a day. 180 capsule 1     ipratropium-albuterol (DUO-NEB) 0.5-2.5 mg/3 mL nebulizer 1 neb 4 times a day until seen by primary and then as needed or as directed 60 vial 1     lidocaine (LIDODERM) 5 % Place 1 patch on the skin daily. To left hip 30 patch 1     losartan (COZAAR) 100 MG tablet Take 1 tablet (100 mg total) by mouth daily. 90 tablet 3     meclizine (ANTIVERT) 25 mg tablet Take 1 tablet (25 mg total) by mouth 3 (three) times a day as needed for dizziness. 30 tablet 0     metoprolol succinate (TOPROL-XL) 100 MG 24 hr tablet Take 1 tablet (100 mg total) by mouth at bedtime. 90 tablet 3     MYRBETRIQ 25 mg Tb24 ER tablet Take 25 mg by mouth daily.              pantoprazole (PROTONIX) 40 MG tablet Take 40 mg by mouth daily as needed.              predniSONE (DELTASONE) 10 mg tablet 4 tablets daily for 2 days, then 2 tablets daily for 3 days, then 1 tablet daily for 3 days, then DC. 17 tablet 0     PROVENTIL HFA 90 mcg/actuation inhaler Inhale 1 puff every 6 (six) hours as needed.        ranitidine (ZANTAC) 150 MG tablet Take 150 mg by mouth daily.       simvastatin (ZOCOR) 40 MG tablet Take 40 mg by mouth bedtime.       solifenacin (VESICARE) 5 MG tablet Take 1 tablet (5 mg total) by mouth at bedtime. 90 tablet 0     tamoxifen (NOLVADEX) 20 MG tablet Take 20 mg by mouth at bedtime.              traZODone (DESYREL) 50 MG tablet Take 1 tablet (50 mg total) by mouth at bedtime as needed. 90 tablet 0     venlafaxine (EFFEXOR XR) 150 MG 24 hr capsule Take 1 capsule (150 mg total) by mouth daily. 90 capsule 1     No current facility-administered medications for this visit.      Allergies   Allergen Reactions     Sulfa (Sulfonamide Antibiotics) Rash     Headaches and dizziness.     Homeopathic Drugs Unknown and Other (See Comments)     Comment: runny nose, Comment: runny nose      Mold Extracts      Morphine (Pf)      hallucinate     Lisinopril Cough, Unknown and Itching     Comment: cough, Comment: cough     Sulfacetamide Sodium Rash     Social History     Tobacco Use     Smoking status: Former Smoker     Last attempt to quit: 10/28/2007     Years since quittin.7     Smokeless tobacco: Former User     Quit date: 2004   Substance Use Topics     Alcohol use: No     Drug use: No

## 2021-05-30 NOTE — TELEPHONE ENCOUNTER
Return call to patient who stated she has been experiencing upper chest congestion since 2/2019 and has been seen by PCP and ENT but sx persist and she is concerned sx related to HF - patient denied wt gain, THIERRY, dizziness - patient does feel shortness of breath and has occasional productive.    Confirmed patient was recently seen in ED 7-15-19 for bronchitis - patient stated she was prescribed Mucinex and Prednisone but sx have not improved and her ribs hurt from coughing.    Explained to patient that sx do not appear to be related to HF and instructed her to arrange f/u with PCP for further evaluation - reassured her that PCP can determine need for f/u with HF NP in the absence of Dr. Chilel -  patient verbalized understanding and agreed with plan.  mg

## 2021-05-30 NOTE — PATIENT INSTRUCTIONS - HE
Continue current medications.    He will be contacted to set up an appointment with Dr. Shane, to discuss osteoporosis treatment plan.    Follow with me in 3 to 4 months for routine checkup.    Consider starting a physical therapy daily exercise routine for your chronic left hip pain.  Contact your orthopedic clinic for therapy orders.  Avoid taking ibuprofen, which can affect kidney function and blood pressure.

## 2021-05-30 NOTE — TELEPHONE ENCOUNTER
----- Message from Samantha Alexis sent at 7/24/2019  1:59 PM CDT -----  Contact: Pt  General phone call:    Caller: Pt  Primary cardiologist: Ranjana  Detailed reason for call: Pt stated she has CHF and is worried because she's been feeling congested. Please call back.  Best phone number: [phone]  Best time to contact: Any  Ok to leave a detailed message? Yes  Device? No    Additional Info:

## 2021-05-30 NOTE — PATIENT INSTRUCTIONS - HE
Your blood pressure is too low today.  Reduce losartan dose from 100 mg to 50 mg.  Follow-up with me in 2 weeks for blood pressure check.    Doxycycline antibiotic sent into pharmacy.  Take 1 tablet every 12 hours for next 10 days.    Codeine cough medication sent into pharmacy.  Take at night to start.  Otherwise, every 8 hours as needed.    We will restart prednisone taper.  Take 4 tablets for 3 days, then 3 tablets for 3 days, then 2 tablets for 3 days, then 1 tablet for 3 days.    Take your prednisone with food in the morning.  You cannot use Aleve or ibuprofen while on this medication.    Follow-up with me in 2 weeks for recheck of blood pressure and cough.  If cough persists, we may need to trial a complete discontinuance of your losartan.

## 2021-05-30 NOTE — TELEPHONE ENCOUNTER
Spoke with Stephanie, she is doing well outside of feeling like she is becoming a hermit due to all the humidity we have been experiencing lately.  No questions or issues for me.  Reminded her to call if she has any before our call next month.  Safia Davila, LRT, COPD Educator

## 2021-05-31 VITALS — BODY MASS INDEX: 29.78 KG/M2 | WEIGHT: 152.5 LBS

## 2021-05-31 VITALS — BODY MASS INDEX: 30.04 KG/M2 | WEIGHT: 153 LBS | HEIGHT: 60 IN

## 2021-05-31 VITALS — BODY MASS INDEX: 29.72 KG/M2 | HEIGHT: 60 IN | WEIGHT: 151.4 LBS

## 2021-05-31 VITALS — HEIGHT: 60 IN | WEIGHT: 155 LBS | BODY MASS INDEX: 30.43 KG/M2

## 2021-05-31 VITALS — WEIGHT: 153 LBS | BODY MASS INDEX: 30.04 KG/M2 | HEIGHT: 60 IN

## 2021-05-31 VITALS — WEIGHT: 144.7 LBS | BODY MASS INDEX: 28.26 KG/M2

## 2021-05-31 VITALS — HEIGHT: 60 IN | BODY MASS INDEX: 29.96 KG/M2 | WEIGHT: 152.6 LBS

## 2021-05-31 VITALS — HEIGHT: 60 IN | WEIGHT: 143.4 LBS | BODY MASS INDEX: 28.16 KG/M2

## 2021-05-31 VITALS — BODY MASS INDEX: 29.61 KG/M2 | WEIGHT: 150.8 LBS | HEIGHT: 60 IN

## 2021-05-31 NOTE — PROGRESS NOTES
Clinic Note    Assessment:     Assessment and Plan:  1. Cough  Better after prednisone taper.  She still has some light congestion that she would like treated with Tessalon Perles.  She would also like to see a pulmonologist within the [facility] system.  Currently seeing a pulmonologist at [facility].     - benzonatate (TESSALON) 200 MG capsule; Take 1 capsule (200 mg total) by mouth 3 (three) times a day.  Dispense: 60 capsule; Refill: 2  - codeine-guaiFENesin (GUAIFENESIN AC)  mg/5 mL liquid; Take 5 mL by mouth 3 (three) times a day as needed for cough.  Dispense: 236 mL; Refill: 0  - Ambulatory referral to Pulmonology    2. Essential hypertension  Blood pressure is 97/67 today.  I am going to reduce her losartan further, down to 25 mg.  Recheck basic metabolic panel today.  I gave her instructions on follow-up with me given home BP measurements, otherwise follow-up with PCP in October.    - losartan (COZAAR) 25 MG tablet; Take 1 tablet (25 mg total) by mouth daily.  Dispense: 90 tablet; Refill: 1  - Basic Metabolic Panel       Patient Instructions   I am glad that your coughing seems to be improved.    Prescription for Tessalon Perles sent into pharmacy.  Take 1 capsule every 8 hours as needed.    Prescription for cough medication sent into pharmacy to help with the evening/nighttime symptoms.    I will place a referral to pulmonology to have you set up with the [facility] system.  I recommend Dr. Christy Berger.  We will have you see the specialty  about setting up this new appointment.  Otherwise, you can always call at [phone] to schedule an appointment.    We need to recheck your kidney labs and potassium levels today after reducing your losartan.  I sent a new prescription for lower dose (25 mg) to your pharmacy.    Continue checking blood pressures intermittently.  Follow-up if greater than 140/90.  Follow-up if consistently lasts then 105/55 or if you have worsening lightheaded dizzy  sheri.    Otherwise, follow-up with Dr. Pankaj Montanez in October.    Return in about 2 months (around 10/19/2019).         Subjective:      Irene León is a 74 y.o. female who comes the clinic today for follow-up.    I saw her 2 weeks ago for a persistent cough.  I prescribed her a prolonged prednisone taper and had her follow-up for recheck.  She has completed her prednisone taper and says that she feels much better.  She still has a bit of congestion but no more wheezing.  She never filled her prescription for Tessalon Perles and would like that done today.    Her blood pressure was low at her last appointment, with measurement of 86/58.  I had her reduce her losartan from 100 mg to 50 mg and follow-up today for recheck.  Today, her blood pressure is 97/67.  She does have occasional orthostasis symptoms with positional changes, not severe.  No syncopal episodes.  She has not fallen.    The following portions of the patient's history were reviewed and updated as appropriate: Allergies, medications, problems, prior note.    Review of Systems:    Review is otherwise negative except for what is mentioned above.     Social Hx:    Social History     Tobacco Use   Smoking Status Former Smoker     Last attempt to quit: 10/28/2007     Years since quittin.8   Smokeless Tobacco Former User     Quit date: 2004         Objective:     Vitals:    19 0927   BP: 97/67   Pulse: 61   Weight: 150 lb 3 oz (68.1 kg)       Exam:    General: No apparent distress. Calm. Alert and Oriented X3. Pt behavior is appropriate.  Chest/Lungs: Normal chest wall, clear to auscultation, normal respiratory effort and rate.   Heart/Pulses: Regular rate and rhythm, strong and equal radial pulses, no murmurs. Capillary refill <2 seconds. No edema.       Patient Active Problem List   Diagnosis     Leg pain, left     Femur fracture (H)     Hypertension     Pulmonary emphysema, unspecified emphysema type (H)     Hyperlipidemia     Bladder  incontinence     Cardiomyopathy, idiopathic (H)     Depression with anxiety     Family history of blood disease     GERD (gastroesophageal reflux disease)     Post menopausal syndrome     OP (osteoporosis)     Osteoarthritis of hip     Insomnia     Dyspnea     Malignant neoplasm of central portion of left female breast (H)     Pleural nodules     Hot flashes due to tamoxifen     COPD exacerbation (H)     Herpes zoster without complication     Chest wall pain     Benign essential hypertension     Dyslipidemia     Acute respiratory failure with hypoxia (H)     Factor 5 Leiden mutation, heterozygous (H)     CKD (chronic kidney disease) stage 3, GFR 30-59 ml/min (H)     Centrilobular emphysema (H)     Anisocoria     Herniated intervertebral disc     Hot flashes, menopausal     OAB (overactive bladder)     Physical deconditioning     Pulsatile tinnitus of both ears     Chest pain     Acute hypoxemic respiratory failure (H)     S/P hip replacement, left     Hip pain, left     Current Outpatient Medications   Medication Sig Dispense Refill     acetaminophen (TYLENOL) 500 MG tablet Take 1-2 tablets (500-1,000 mg total) by mouth every 6 (six) hours as needed.  0     ascorbic acid, vitamin C, (VITAMIN C) 1000 MG tablet Take 1,000 mg by mouth daily.       aspirin 81 MG EC tablet Take 81 mg by mouth daily.       azelastine (ASTELIN) 137 mcg (0.1 %) nasal spray 2 sprays into each nostril daily.       cetirizine (ZYRTEC) 10 MG tablet Take 10 mg by mouth daily.              chlorhexidine (PERIDEX) 0.12 % solution Apply 15 mL to the mouth or throat at bedtime.              cholecalciferol, vitamin D3, 1,000 unit tablet Take 1,000 Units by mouth daily.       codeine-guaiFENesin (GUAIFENESIN AC)  mg/5 mL liquid Take 5 mL by mouth 3 (three) times a day as needed for cough. 236 mL 0     cyanocobalamin, vitamin B-12, (VITAMIN B-12 ORAL) Take 1,000 mcg by mouth daily.              fluticasone (FLONASE) 50 mcg/actuation nasal spray  Apply 1 spray into each nostril at bedtime.       fluticasone-salmeterol (ADVAIR) 500-50 mcg/dose DISKUS Inhale 1 puff 2 (two) times a day.       furosemide (LASIX) 20 MG tablet Take 10 mg by mouth daily.              gabapentin (NEURONTIN) 300 MG capsule Take 1 capsule (300 mg total) by mouth 2 (two) times a day. 180 capsule 1     ipratropium (ATROVENT HFA) 17 mcg/actuation inhaler Inhale 2 puffs every 6 (six) hours. 3 Inhaler 3     ipratropium-albuterol (DUO-NEB) 0.5-2.5 mg/3 mL nebulizer 1 neb 4 times a day until seen by primary and then as needed or as directed 60 vial 1     lidocaine (LIDODERM) 5 % Place 1 patch on the skin daily. To left hip 30 patch 1     meclizine (ANTIVERT) 25 mg tablet Take 1 tablet (25 mg total) by mouth 3 (three) times a day as needed for dizziness. 30 tablet 0     metoprolol succinate (TOPROL-XL) 100 MG 24 hr tablet Take 1 tablet (100 mg total) by mouth at bedtime. 90 tablet 3     MYRBETRIQ 25 mg Tb24 ER tablet Take 25 mg by mouth daily.              pantoprazole (PROTONIX) 40 MG tablet Take 40 mg by mouth daily as needed.              PROVENTIL HFA 90 mcg/actuation inhaler Inhale 1 puff every 6 (six) hours as needed.        ranitidine (ZANTAC) 150 MG tablet Take 150 mg by mouth daily.       simvastatin (ZOCOR) 40 MG tablet Take 40 mg by mouth bedtime.       solifenacin (VESICARE) 5 MG tablet Take 1 tablet (5 mg total) by mouth at bedtime. 90 tablet 0     tamoxifen (NOLVADEX) 20 MG tablet Take 20 mg by mouth at bedtime.              traZODone (DESYREL) 50 MG tablet Take 1 tablet (50 mg total) by mouth at bedtime as needed. 90 tablet 0     venlafaxine (EFFEXOR XR) 150 MG 24 hr capsule Take 1 capsule (150 mg total) by mouth daily. 90 capsule 1     benzonatate (TESSALON) 200 MG capsule Take 1 capsule (200 mg total) by mouth 3 (three) times a day. 60 capsule 2     losartan (COZAAR) 25 MG tablet Take 1 tablet (25 mg total) by mouth daily. 90 tablet 1     No current facility-administered  medications for this visit.          Aakash Flores (Rob), SUNITA    8/19/2019

## 2021-05-31 NOTE — PATIENT INSTRUCTIONS - HE
I am glad that your coughing seems to be improved.    Prescription for Tessalon Perles sent into pharmacy.  Take 1 capsule every 8 hours as needed.    Prescription for cough medication sent into pharmacy to help with the evening/nighttime symptoms.    I will place a referral to pulmonology to have you set up with the [facility] system.  I recommend Dr. Christy Berger.  We will have you see the specialty  about setting up this new appointment.  Otherwise, you can always call at [phone] to schedule an appointment.    We need to recheck your kidney labs and potassium levels today after reducing your losartan.  I sent a new prescription for lower dose (25 mg) to your pharmacy.    Continue checking blood pressures intermittently.  Follow-up if greater than 140/90.  Follow-up if consistently lasts then 105/55 or if you have worsening lightheaded dizzy spells.    Otherwise, follow-up with Dr. Pankaj Montanez in October.

## 2021-05-31 NOTE — TELEPHONE ENCOUNTER
Below BP update per patient.      Alia Quiroz, [facility]  10:50 AM  8/2/2019

## 2021-05-31 NOTE — TELEPHONE ENCOUNTER
RN Triage:     Patient is calling in stating that she was given prednisone by NP Sandra yesterday. She has not started it yet. She had questions about taking prednisone. Medication questions answered for patient she will try taking 2 tabs in the early am and 2 tabs later afternoon for the first couple of days. She wanted to see how her body would handle the steroid.   Mallory Connor RN, BSN [facility] Triage Nurse      Reason for Disposition    Caller has medication question only, adult not sick, and triager answers question    Protocols used: MEDICATION QUESTION CALL-A-AH

## 2021-05-31 NOTE — TELEPHONE ENCOUNTER
Spoke with Stephanie, she is doing well, frustrated with the humid weather we have been having this summer.  She is checking her action plan and calling when in her yellow zone. Has a follow up appointment with her Pulmonary MD end of next month.  Reminded her to call with any questions and that we will call her again next month.  Safia Davila, YOBANIT, COPD Educator

## 2021-05-31 NOTE — TELEPHONE ENCOUNTER
FYI - Status Update  Who is Calling: Patient  Update: Was asked to keep Cesar Flores informed of her blood pressure -  96/55 and has been consistent at this level for the past 3 days.  Okay to leave a detailed message?:  Yes

## 2021-05-31 NOTE — TELEPHONE ENCOUNTER
Refill Approved    Rx renewed per Medication Renewal Policy. Medication was last renewed on 6/18/19.    Last office visit: 8/19/19    Erin Meyers, [facility] Triage/Med Refill 8/24/2019     Requested Prescriptions   Pending Prescriptions Disp Refills     solifenacin (VESICARE) 5 MG tablet [Pharmacy Med Name: SOLIFENACIN SUCCINATE TABS 5MG] 90 tablet 4     Sig: TAKE 1 TABLET AT BEDTIME       Urinary Incontinence Medications Refill Protocol Passed - 8/24/2019  2:38 AM        Passed - PCP or prescribing provider visit in past 12 months       Last office visit with prescriber/PCP: 3/8/2019 Collette Torres NP OR same dept: 3/8/2019 Collette Torres NP OR same specialty: 3/8/2019 Collette Torres NP  Last physical: 2/21/2019 Last MTM visit: Visit date not found   Next visit within 3 mo: Visit date not found  Next physical within 3 mo: Visit date not found  Prescriber OR PCP: Collette Torres NP  Last diagnosis associated with med order: 1. Urinary incontinence, unspecified type  - solifenacin (VESICARE) 5 MG tablet [Pharmacy Med Name: SOLIFENACIN SUCCINATE TABS 5MG]; TAKE 1 TABLET AT BEDTIME  Dispense: 90 tablet; Refill: 4    If protocol passes may refill for 12 months if within 3 months of last provider visit (or a total of 15 months).

## 2021-06-01 ENCOUNTER — RECORDS - HEALTHEAST (OUTPATIENT)
Dept: ADMINISTRATIVE | Facility: CLINIC | Age: 76
End: 2021-06-01

## 2021-06-01 ENCOUNTER — RECORDS - HEALTHEAST (OUTPATIENT)
Dept: ADMINISTRATIVE | Facility: OTHER | Age: 76
End: 2021-06-01

## 2021-06-01 VITALS — BODY MASS INDEX: 29.23 KG/M2 | HEIGHT: 60 IN | WEIGHT: 148.9 LBS

## 2021-06-01 NOTE — TELEPHONE ENCOUNTER
Refill Approved    Rx renewed per Medication Renewal Policy. Medication was last renewed on 6/18/19.    Janeth Quinn, [facility] Triage/Med Refill 9/16/2019     Requested Prescriptions   Pending Prescriptions Disp Refills     traZODone (DESYREL) 50 MG tablet [Pharmacy Med Name: TRAZODONE HCL TABS 50MG] 90 tablet 4     Sig: Take 1 tablet (50 mg total) by mouth at bedtime as needed.       Tricyclics/Misc Antidepressant/Antianxiety Meds Refill Protocol Passed - 9/16/2019  2:07 AM        Passed - PCP or prescribing provider visit in last year     Last office visit with prescriber/PCP: 3/8/2019 Collette Torres NP OR same dept: 3/8/2019 Collette Torres NP OR same specialty: 3/8/2019 Collette Torres NP  Last physical: 2/21/2019 Last MTM visit: Visit date not found   Next visit within 3 mo: Visit date not found  Next physical within 3 mo: Visit date not found  Prescriber OR PCP: Collette Torres NP  Last diagnosis associated with med order: 1. Primary insomnia  - traZODone (DESYREL) 50 MG tablet [Pharmacy Med Name: TRAZODONE HCL TABS 50MG]; TAKE 1 TABLET AT BEDTIME AS NEEDED  Dispense: 90 tablet; Refill: 4    If protocol passes may refill for 12 months if within 3 months of last provider visit (or a total of 15 months).

## 2021-06-01 NOTE — TELEPHONE ENCOUNTER
Medication Request  Medication name: Myrbetriq 25 mg, two tablets daily  Pharmacy Name and Location: [facility]  Reason for request: Current medication   When did you use medication last?:  today  Patient offered appointment: Patient has an appointment in October with Amilcar Flores.  Okay to leave a detailed message: yes    Patient mentions that if the medication comes in a 50 mg tablet, she would prefer a prescription for that strength.

## 2021-06-01 NOTE — TELEPHONE ENCOUNTER
Spoke briefly with patient she was not feeling well, reminded her to call fi she has questions before next month.  Safia Davila, YOBANIT, COPD Educator

## 2021-06-02 ENCOUNTER — RECORDS - HEALTHEAST (OUTPATIENT)
Dept: GENERAL RADIOLOGY | Facility: CLINIC | Age: 76
End: 2021-06-02

## 2021-06-02 ENCOUNTER — COMMUNICATION - HEALTHEAST (OUTPATIENT)
Dept: INTERNAL MEDICINE | Facility: CLINIC | Age: 76
End: 2021-06-02

## 2021-06-02 ENCOUNTER — OFFICE VISIT - HEALTHEAST (OUTPATIENT)
Dept: INTERNAL MEDICINE | Facility: CLINIC | Age: 76
End: 2021-06-02

## 2021-06-02 VITALS — WEIGHT: 152 LBS | BODY MASS INDEX: 27.97 KG/M2 | HEIGHT: 62 IN

## 2021-06-02 VITALS — HEIGHT: 61 IN | WEIGHT: 152.9 LBS | BODY MASS INDEX: 28.87 KG/M2

## 2021-06-02 VITALS — WEIGHT: 145.7 LBS | BODY MASS INDEX: 28.46 KG/M2

## 2021-06-02 VITALS — BODY MASS INDEX: 29.06 KG/M2 | HEIGHT: 60 IN | WEIGHT: 148 LBS

## 2021-06-02 VITALS — WEIGHT: 152.8 LBS | BODY MASS INDEX: 28.17 KG/M2

## 2021-06-02 DIAGNOSIS — J44.1 COPD EXACERBATION (H): ICD-10-CM

## 2021-06-02 DIAGNOSIS — E87.6 HYPOKALEMIA: ICD-10-CM

## 2021-06-02 DIAGNOSIS — I50.22 CHRONIC SYSTOLIC CONGESTIVE HEART FAILURE (H): ICD-10-CM

## 2021-06-02 DIAGNOSIS — J44.9 CHRONIC OBSTRUCTIVE PULMONARY DISEASE, UNSPECIFIED COPD TYPE (H): ICD-10-CM

## 2021-06-02 DIAGNOSIS — R05.9 COUGH: ICD-10-CM

## 2021-06-02 DIAGNOSIS — I27.20 PULMONARY HYPERTENSION (H): ICD-10-CM

## 2021-06-02 DIAGNOSIS — I48.0 PAROXYSMAL ATRIAL FIBRILLATION (H): ICD-10-CM

## 2021-06-02 DIAGNOSIS — M19.042 PRIMARY OSTEOARTHRITIS OF BOTH HANDS: ICD-10-CM

## 2021-06-02 DIAGNOSIS — R32 URINARY INCONTINENCE, UNSPECIFIED TYPE: ICD-10-CM

## 2021-06-02 DIAGNOSIS — M19.041 PRIMARY OSTEOARTHRITIS OF BOTH HANDS: ICD-10-CM

## 2021-06-02 DIAGNOSIS — M81.0 SENILE OSTEOPOROSIS: ICD-10-CM

## 2021-06-02 DIAGNOSIS — Z85.3 HISTORY OF RIGHT BREAST CANCER: ICD-10-CM

## 2021-06-02 DIAGNOSIS — R94.31 LONG QT INTERVAL: ICD-10-CM

## 2021-06-02 DIAGNOSIS — Z51.81 ENCOUNTER FOR THERAPEUTIC DRUG MONITORING: ICD-10-CM

## 2021-06-02 LAB
ALBUMIN SERPL-MCNC: 3.3 G/DL (ref 3.5–5)
ALP SERPL-CCNC: 73 U/L (ref 45–120)
ALT SERPL W P-5'-P-CCNC: <9 U/L (ref 0–45)
ANION GAP SERPL CALCULATED.3IONS-SCNC: 14 MMOL/L (ref 5–18)
AST SERPL W P-5'-P-CCNC: 23 U/L (ref 0–40)
BILIRUB SERPL-MCNC: 0.4 MG/DL (ref 0–1)
BUN SERPL-MCNC: 31 MG/DL (ref 8–28)
CALCIUM SERPL-MCNC: 9.2 MG/DL (ref 8.5–10.5)
CHLORIDE BLD-SCNC: 101 MMOL/L (ref 98–107)
CO2 SERPL-SCNC: 25 MMOL/L (ref 22–31)
CREAT SERPL-MCNC: 1.71 MG/DL (ref 0.6–1.1)
ERYTHROCYTE [DISTWIDTH] IN BLOOD BY AUTOMATED COUNT: 14.3 % (ref 11–14.5)
GFR SERPL CREATININE-BSD FRML MDRD: 29 ML/MIN/1.73M2
GLUCOSE BLD-MCNC: 78 MG/DL (ref 70–125)
HCT VFR BLD AUTO: 41.7 % (ref 35–47)
HGB BLD-MCNC: 13.3 G/DL (ref 12–16)
MAGNESIUM SERPL-MCNC: 2.4 MG/DL (ref 1.8–2.6)
MCH RBC QN AUTO: 28.2 PG (ref 27–34)
MCHC RBC AUTO-ENTMCNC: 31.9 G/DL (ref 32–36)
MCV RBC AUTO: 88 FL (ref 80–100)
PLATELET # BLD AUTO: 271 THOU/UL (ref 140–440)
PMV BLD AUTO: 8.9 FL (ref 7–10)
POTASSIUM BLD-SCNC: 3.9 MMOL/L (ref 3.5–5)
PROT SERPL-MCNC: 7.1 G/DL (ref 6–8)
RBC # BLD AUTO: 4.72 MILL/UL (ref 3.8–5.4)
SODIUM SERPL-SCNC: 140 MMOL/L (ref 136–145)
WBC: 10 THOU/UL (ref 4–11)

## 2021-06-02 RX ORDER — PREDNISONE 10 MG/1
TABLET ORAL
Qty: 19 TABLET | Refills: 0 | Status: SHIPPED | OUTPATIENT
Start: 2021-06-02 | End: 2021-08-05

## 2021-06-02 RX ORDER — SOLIFENACIN SUCCINATE 5 MG/1
5 TABLET, FILM COATED ORAL AT BEDTIME
Qty: 90 TABLET | Refills: 3 | Status: SHIPPED | OUTPATIENT
Start: 2021-06-02 | End: 2021-11-01

## 2021-06-02 ASSESSMENT — PATIENT HEALTH QUESTIONNAIRE - PHQ9: SUM OF ALL RESPONSES TO PHQ QUESTIONS 1-9: 0

## 2021-06-02 NOTE — TELEPHONE ENCOUNTER
Patient was given a zpack antibiotic yesterday.  If she is more short of breath, she should go to ER.

## 2021-06-02 NOTE — PROGRESS NOTES
RESPIRATORY CARE NOTE     Patient Name: Irene León  Today's Date: 10/4/2019     Complete PFT done. Pt performed tests with good effort. Test results meet ATS criteria. DLCO done using user- defined protocol. Albuterol 2.5mg given. Results scanned into epic. Pt left in no distress.       YOBANI DickinsonT

## 2021-06-02 NOTE — TELEPHONE ENCOUNTER
Pulmonary test at 9 am, questions if she can use her rescue inhaler this morning prior to the test.     Offered call back from the pulmonology department after they arrive. Patient is upset and disagreeable with this plan    Patient states she has been seeing her primary over at [facility] and that is who ordered the test.     Advised patient to reach out to provider who ordered the test for recommendations. Patient does not like this choice either.     Asked her what she would like me to do and she explained that she wanted me to give her the answer. Explained that she could call back after 8 to see if they can get in touch with them then.    Reason for Disposition    Caller has URGENT medication question about med that PCP prescribed and triager unable to answer question    Protocols used: MEDICATION QUESTION CALL-A-AH    Anabela Lima RN Triage Nurse Advisor

## 2021-06-02 NOTE — TELEPHONE ENCOUNTER
"RN Triage:    Was seen in clinic yesterday and was prescribed a Z-pack for \"bronchitis\".  Prescription was sent to mail order pharmacy by mistake.  Stephanie is requesting that the RX be sent to [facility] on [address] in [place].  Triage nurse phoned rx to [facility] per current medication list.    Octavia Gross, RN  [facility]      "

## 2021-06-02 NOTE — TELEPHONE ENCOUNTER
"Please call patient and let her know she never made it to lab after our appointment.  I would like to recheck her kidney and electrolytes after decreasing her blood pressure medication.  I put orders in as \"future.\"  She should schedule a lab only appointment within the next week or so.  "

## 2021-06-02 NOTE — TELEPHONE ENCOUNTER
Turns out the the Z-Arthur was sent to [facility]. New order pended to be sent to patient's [facility] pharmacy so that she can start it today. Patient will start it asap and go to the ER if she still has a worsening cough.      Alia Quiroz CMA  10:43 AM  10/31/2019

## 2021-06-02 NOTE — PATIENT INSTRUCTIONS - HE
I think your blood pressure is fine right now.  No changes in medications.    Recheck your kidney labs and electrolytes after reducing her losartan down to 25 mg.    Prescription for azithromycin sent into your pharmacy.  You will have to get refills from your pulmonologist from this point forward.    Referral to pain clinic is in for refills of your tramadol.  Alternatively, pain clinic might recommend alternatives.  Telephone number is [phone]    Follow-up with Dr. Pankaj Montanez in approximately 3 months for routine checkup.

## 2021-06-02 NOTE — PROGRESS NOTES
Clinic Note    Assessment:     Assessment and Plan:  1. Essential hypertension  BP is 139/79.  She was having some low blood pressures at higher dose of losartan.  I will keep her steady at current dose until she sees PCP this winter.  Recheck BMP today    - losartan (COZAAR) 25 MG tablet; Take 1 tablet (25 mg total) by mouth daily.  Dispense: 90 tablet; Refill: 1  - Basic Metabolic Panel    2. Pulmonary emphysema, unspecified emphysema type (H)  She has had recurrent COPD exacerbations and was recently placed on chronic azithromycin therapy.  She takes one 250 mg tablet every other day.  She needs a refill today.  I told her she will need further refills done through her pulmonology clinic    - azithromycin (ZITHROMAX) 250 MG tablet; Take every other day as instructed by pulmonologist  Dispense: 30 tablet; Refill: 0    3. Osteoarthritis of left hip, unspecified osteoarthritis type  She is using tramadol for pain right now, prescribed by her orthopedic doctor.  The orthopedic doctor told her she will need to get refills from her primary.  I told her she should see the pain clinic for further refills or alternative medications.    - Ambulatory referral to Pain Clinic       Patient Instructions   I think your blood pressure is fine right now.  No changes in medications.    Recheck your kidney labs and electrolytes after reducing her losartan down to 25 mg.    Prescription for azithromycin sent into your pharmacy.  You will have to get refills from your pulmonologist from this point forward.    Referral to pain clinic is in for refills of your tramadol.  Alternatively, pain clinic might recommend alternatives.  Telephone number is [phone]    Follow-up with Dr. Pankaj Montanez in approximately 3 months for routine checkup.    Return in about 3 months (around 1/30/2020).         Subjective:      Patient comes the clinic today for follow-up.    She has a history of hypertension but blood pressure was low when I saw her 3  months ago, 97/60.  I had her reduce her losartan down to 25 mg daily.  Today, blood pressure 139/79 but home measurements have been closer to the 120s over 80s range.    She denies any orthostasis or dizziness.    She has a history of recurrent COPD exacerbation with frequent ER visits/prednisone taper.  She saw her pulmonologist at [facility] recently who recommended that she consider using azithromycin every other day for suppression therapy.  She is looking for a refill today.    She is seeing an orthopedic physician at [facility] for chronic left hip pain.  She has had a left hip replacement but pain persists.  She has been using tramadol 50 mg twice daily for many years, this has been refilled by her orthopedic doctor historically but she is wondering if she needs a referral to the pain clinic for further refills.  She denies any problems with constipation.  No recent falls.    The following portions of the patient's history were reviewed and updated as appropriate: Allergies, medications, problem list, prior note.     Review of Systems:    Review is otherwise negative except for what is mentioned above.     Social Hx:    Social History     Tobacco Use   Smoking Status Former Smoker     Last attempt to quit: 10/28/2007     Years since quittin.0   Smokeless Tobacco Former User     Quit date: 2004         Objective:     Vitals:    10/30/19 1327   BP: 139/79   Pulse: 61   Weight: 145 lb (65.8 kg)       Exam:    General: No apparent distress. Calm. Alert and Oriented X3. Pt behavior is appropriate.  Chest/Lungs: Normal chest wall, clear to auscultation, normal respiratory effort and rate.   Heart/Pulses: Regular rate and rhythm, strong and equal radial pulses, no murmurs. Capillary refill <2 seconds. No edema.       Patient Active Problem List   Diagnosis     Leg pain, left     Femur fracture (H)     Hypertension     Pulmonary emphysema, unspecified emphysema type (H)     Hyperlipidemia     Bladder incontinence      Cardiomyopathy, idiopathic (H)     Depression with anxiety     Family history of blood disease     GERD (gastroesophageal reflux disease)     Post menopausal syndrome     OP (osteoporosis)     Osteoarthritis of hip     Insomnia     Dyspnea     Malignant neoplasm of central portion of left female breast (H)     Pleural nodules     Hot flashes due to tamoxifen     COPD exacerbation (H)     Herpes zoster without complication     Chest wall pain     Benign essential hypertension     Dyslipidemia     Acute respiratory failure with hypoxia (H)     Factor 5 Leiden mutation, heterozygous (H)     CKD (chronic kidney disease) stage 3, GFR 30-59 ml/min (H)     Centrilobular emphysema (H)     Anisocoria     Herniated intervertebral disc     Hot flashes, menopausal     OAB (overactive bladder)     Physical deconditioning     Pulsatile tinnitus of both ears     Chest pain     Acute hypoxemic respiratory failure (H)     S/P hip replacement, left     Hip pain, left     Current Outpatient Medications   Medication Sig Dispense Refill     acetaminophen (TYLENOL) 500 MG tablet Take 1-2 tablets (500-1,000 mg total) by mouth every 6 (six) hours as needed.  0     ascorbic acid, vitamin C, (VITAMIN C) 1000 MG tablet Take 1,000 mg by mouth daily.       aspirin 81 MG EC tablet Take 81 mg by mouth daily.       azelastine (ASTELIN) 137 mcg (0.1 %) nasal spray 2 sprays into each nostril daily.       azithromycin (ZITHROMAX) 250 MG tablet Take every other day as instructed by pulmonologist 30 tablet 0     cetirizine (ZYRTEC) 10 MG tablet Take 10 mg by mouth daily.              chlorhexidine (PERIDEX) 0.12 % solution Apply 15 mL to the mouth or throat at bedtime.              cholecalciferol, vitamin D3, 1,000 unit tablet Take 1,000 Units by mouth daily.       codeine-guaiFENesin (GUAIFENESIN AC)  mg/5 mL liquid Take 5 mL by mouth 3 (three) times a day as needed for cough. 236 mL 0     cyanocobalamin, vitamin B-12, (VITAMIN B-12 ORAL) Take  1,000 mcg by mouth daily.              fluticasone (FLONASE) 50 mcg/actuation nasal spray Apply 1 spray into each nostril at bedtime.       fluticasone-salmeterol (ADVAIR) 500-50 mcg/dose DISKUS Inhale 1 puff 2 (two) times a day.       furosemide (LASIX) 20 MG tablet Take 10 mg by mouth daily.              gabapentin (NEURONTIN) 300 MG capsule Take 1 capsule (300 mg total) by mouth 2 (two) times a day. 180 capsule 1     ipratropium (ATROVENT HFA) 17 mcg/actuation inhaler Inhale 2 puffs every 6 (six) hours. 3 Inhaler 3     ipratropium-albuterol (DUO-NEB) 0.5-2.5 mg/3 mL nebulizer 1 neb 4 times a day until seen by primary and then as needed or as directed 60 vial 1     lidocaine (LIDODERM) 5 % Place 1 patch on the skin daily. To left hip 30 patch 1     losartan (COZAAR) 25 MG tablet Take 1 tablet (25 mg total) by mouth daily. 90 tablet 1     meclizine (ANTIVERT) 25 mg tablet Take 1 tablet (25 mg total) by mouth 3 (three) times a day as needed for dizziness. 30 tablet 0     metoprolol succinate (TOPROL-XL) 100 MG 24 hr tablet Take 1 tablet (100 mg total) by mouth at bedtime. 90 tablet 3     mirabegron (MYRBETRIQ) 50 mg Tb24 ER tablet Take 1 tablet (50 mg total) by mouth daily. 90 tablet 1     MYRBETRIQ 25 mg Tb24 ER tablet Take 25 mg by mouth daily.              pantoprazole (PROTONIX) 40 MG tablet Take 40 mg by mouth daily as needed.              PROVENTIL HFA 90 mcg/actuation inhaler Inhale 1 puff every 6 (six) hours as needed.        ranitidine (ZANTAC) 150 MG tablet Take 150 mg by mouth daily.       simvastatin (ZOCOR) 40 MG tablet Take 40 mg by mouth bedtime.       solifenacin (VESICARE) 5 MG tablet TAKE 1 TABLET AT BEDTIME 90 tablet 3     tamoxifen (NOLVADEX) 20 MG tablet Take 20 mg by mouth at bedtime.              traZODone (DESYREL) 50 MG tablet Take 1 tablet (50 mg total) by mouth at bedtime as needed. 90 tablet 1     venlafaxine (EFFEXOR-XR) 150 MG 24 hr capsule TAKE 1 CAPSULE DAILY 90 capsule 4      benzonatate (TESSALON) 200 MG capsule Take 1 capsule (200 mg total) by mouth 3 (three) times a day. 60 capsule 2     No current facility-administered medications for this visit.        I spent 25 minutes with patient face to face, of which >50% was counseling regarding the above plan       Aakash Flores (Rob), SUNITA    10/30/2019

## 2021-06-02 NOTE — PATIENT INSTRUCTIONS - HE
1. Decrease metoprolol succinate to 50 mg or half tablet daily to allow heart rate to increase.  2. Increase furosemide to 10 mg daily  3. Check labs today and set up echo to re-look at heart function. Further recommendations to follow.

## 2021-06-02 NOTE — TELEPHONE ENCOUNTER
If patient has shortness of breath, she can use her rescue inhaler.  It may affect the pulmonary function test some, but if she needs a rescue inhaler she should use it.

## 2021-06-02 NOTE — TELEPHONE ENCOUNTER
Spoke with Stephanie . She is feeling well today and in the green zone. She is able to get and take her medications and is compliant in taking them.  Reviewed COPD Action Plan.  Stephanie had no problems or questions for me today. She had wanted to switch to our Pulmonary services, but has now decided to stay with her Pulmonologist at [facility] due to office location.

## 2021-06-03 ENCOUNTER — COMMUNICATION - HEALTHEAST (OUTPATIENT)
Dept: INTERNAL MEDICINE | Facility: CLINIC | Age: 76
End: 2021-06-03

## 2021-06-03 VITALS
BODY MASS INDEX: 28.16 KG/M2 | DIASTOLIC BLOOD PRESSURE: 71 MMHG | HEIGHT: 62 IN | RESPIRATION RATE: 16 BRPM | SYSTOLIC BLOOD PRESSURE: 120 MMHG | WEIGHT: 153 LBS | HEART RATE: 54 BPM

## 2021-06-03 VITALS — BODY MASS INDEX: 27.47 KG/M2 | WEIGHT: 150.19 LBS

## 2021-06-03 VITALS — WEIGHT: 150.19 LBS | HEIGHT: 62 IN | BODY MASS INDEX: 27.64 KG/M2

## 2021-06-03 VITALS — HEIGHT: 62 IN | BODY MASS INDEX: 28.16 KG/M2 | WEIGHT: 153 LBS

## 2021-06-03 VITALS
DIASTOLIC BLOOD PRESSURE: 79 MMHG | SYSTOLIC BLOOD PRESSURE: 139 MMHG | HEART RATE: 61 BPM | WEIGHT: 145 LBS | BODY MASS INDEX: 26.52 KG/M2

## 2021-06-03 VITALS — BODY MASS INDEX: 28.36 KG/M2 | WEIGHT: 153.8 LBS

## 2021-06-03 VITALS — WEIGHT: 153 LBS | BODY MASS INDEX: 28.16 KG/M2 | HEIGHT: 62 IN

## 2021-06-03 NOTE — TELEPHONE ENCOUNTER
Left message to call back for: Patient   Information to relay to patient:  Please give message below from Cesar and help schedule lab only appt

## 2021-06-03 NOTE — TELEPHONE ENCOUNTER
Patient scheduled for a lab only appt on 11/27/19. Stated that she will be able to make it.      Alia Quiroz [facility]  3:22 PM  11/4/2019

## 2021-06-03 NOTE — PATIENT INSTRUCTIONS - HE
Irene León,    It was a pleasure to see you today at [facility].     My recommendations after this visit include:  - Please follow up with Dr Chilel in May   - Please follow up with Samantha Herrera in 1 month   - I have increased your metoprolol to 75 mg daily. Please take 1.5 tablets a day  - Please monitor your blood pressure with a goal of less than 120/80. If still elevated please call next week [phone] and inform us of blood pressure readings    Samantha Herrera, CNP

## 2021-06-04 VITALS
OXYGEN SATURATION: 92 % | HEART RATE: 59 BPM | BODY MASS INDEX: 26.98 KG/M2 | DIASTOLIC BLOOD PRESSURE: 70 MMHG | TEMPERATURE: 97.6 F | SYSTOLIC BLOOD PRESSURE: 120 MMHG | WEIGHT: 147.5 LBS

## 2021-06-04 VITALS — BODY MASS INDEX: 27.79 KG/M2 | HEIGHT: 62 IN | WEIGHT: 151 LBS

## 2021-06-04 VITALS
BODY MASS INDEX: 27.6 KG/M2 | HEART RATE: 100 BPM | SYSTOLIC BLOOD PRESSURE: 160 MMHG | WEIGHT: 150 LBS | RESPIRATION RATE: 16 BRPM | DIASTOLIC BLOOD PRESSURE: 100 MMHG | HEIGHT: 62 IN

## 2021-06-04 VITALS
BODY MASS INDEX: 26.48 KG/M2 | HEART RATE: 61 BPM | DIASTOLIC BLOOD PRESSURE: 94 MMHG | SYSTOLIC BLOOD PRESSURE: 192 MMHG | OXYGEN SATURATION: 95 % | WEIGHT: 144.8 LBS | TEMPERATURE: 97.9 F

## 2021-06-04 VITALS
BODY MASS INDEX: 27.69 KG/M2 | SYSTOLIC BLOOD PRESSURE: 170 MMHG | HEART RATE: 80 BPM | TEMPERATURE: 97.4 F | DIASTOLIC BLOOD PRESSURE: 74 MMHG | WEIGHT: 151.4 LBS | OXYGEN SATURATION: 88 %

## 2021-06-04 VITALS
RESPIRATION RATE: 20 BRPM | WEIGHT: 146.6 LBS | HEART RATE: 67 BPM | SYSTOLIC BLOOD PRESSURE: 112 MMHG | BODY MASS INDEX: 26.98 KG/M2 | HEIGHT: 62 IN | DIASTOLIC BLOOD PRESSURE: 70 MMHG

## 2021-06-04 VITALS — WEIGHT: 153.2 LBS | HEIGHT: 62 IN | BODY MASS INDEX: 28.19 KG/M2

## 2021-06-04 VITALS — HEIGHT: 62 IN | WEIGHT: 151 LBS | BODY MASS INDEX: 27.79 KG/M2

## 2021-06-04 VITALS
SYSTOLIC BLOOD PRESSURE: 141 MMHG | OXYGEN SATURATION: 94 % | DIASTOLIC BLOOD PRESSURE: 82 MMHG | HEART RATE: 68 BPM | BODY MASS INDEX: 27.25 KG/M2 | WEIGHT: 149 LBS

## 2021-06-04 VITALS
WEIGHT: 149.5 LBS | DIASTOLIC BLOOD PRESSURE: 70 MMHG | HEART RATE: 60 BPM | OXYGEN SATURATION: 88 % | BODY MASS INDEX: 27.34 KG/M2 | SYSTOLIC BLOOD PRESSURE: 150 MMHG

## 2021-06-04 VITALS
RESPIRATION RATE: 16 BRPM | HEIGHT: 62 IN | HEART RATE: 68 BPM | BODY MASS INDEX: 26.68 KG/M2 | WEIGHT: 145 LBS | SYSTOLIC BLOOD PRESSURE: 156 MMHG | DIASTOLIC BLOOD PRESSURE: 90 MMHG

## 2021-06-04 NOTE — TELEPHONE ENCOUNTER
Who is calling:  Patient  Reason for Call:  Patient states Provider wanted to know when she made her appointments.    [facility]  Dr. Zamora  1/17/2020    Ophthalmology  Dr. Dell Adame  2/5/2020    Date of last appointment with primary care: 1/2/2020  Okay to leave a detailed message: Yes

## 2021-06-04 NOTE — TELEPHONE ENCOUNTER
Patient sent [employer] message requesting refill of Tamoxifen refill to [facility]. Dr. Suarez sent this on 12/12/19 with 4 refills. [employer] message sent back to patient notifying her of this/BLANK Nogueira RN

## 2021-06-04 NOTE — TELEPHONE ENCOUNTER
Spoke with Stephanie . She is feeling well today and in the green zone. She is able to get and take her medications and is compliant in taking them.  Reviewed COPD Action Plan.  Stephanie had no problems or questions for me today.

## 2021-06-04 NOTE — PATIENT INSTRUCTIONS - HE
Continues to use your DuoNeb and inhalers.  Meet with your lung doctor later this month as planned.    If you have progressive worsening shortness of breath or increasing cough, seek medical attention to consider going back on azithromycin, and possibly starting with supplementary oxygen treatment.    Increase losartan to 50 mg a day to reduce blood pressure and reduce strain on your heart failure.    Checking potassium and kidney labs today prior to increase of losartan.    Follow-up in 2 weeks to reevaluate kidney labs and blood pressure.    Also see me in February for follow-up.    See ophthalmology this winter for routine checkup.    You have been referred back to orthopedic clinic to address your left hip pain.  You need to do daily stretching exercises and walking.  I would recommend restarting physical therapy after your evaluation with the orthopedic clinic.    Start pantoprazole/Protonix on a daily basis in addition to your famotidine to help reduce heartburn.

## 2021-06-04 NOTE — PROGRESS NOTES
[facility] clinic Follow Up Note    Irene León   74 y.o. female    Date of Visit: 1/2/2020    Chief Complaint   Patient presents with     Follow-up     Subjective  Stephanie is here for follow-up on multiple medical issues.    She has severe emphysema/COPD.  She did see her pulmonary physician in October.  She was on azithromycin every other day prophylaxis with Advair and DuoNeb.  Quit smoking in 2007.    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    No known ischemic event.  2016 stress test negative.  2003 catheterization with just mild coronary artery disease.  She continues on low-dose aspirin and simvastatin 40 mg.    Has not had the diagnosis of sleep apnea and she does not believe she has any problems sleeping and did not want evaluation.    She has left bundle branch block and some ectopy.    She saw cardiology in November with echo at that time 39% ejection fraction with some diastolic dysfunction but no change from previous echo.  No heart valve problems.    BNP was 1019 in November.  Creatinine was 1.2.    She was previously on 100 mg of losartan but because of low blood pressure that had been decreased down to 25 mg.    She was having higher blood pressures earlier this fall.    December 24 in the ER her blood pressure was 162/98 with dental pain.    She was treated with clindamycin and did have the dental infection treated and the teeth feel better now.  She is no longer on clindamycin.    She still off the azithromycin which was stopped when she went on the clindamycin.    Last chest CT scan was June 2019 with some pulmonary nodularity.    Past history of invasive left breast cancer June 2017 with lumpectomy and radiation.  May 2019 mammogram negative.  On tamoxifen since December 2017.,  Plan follow-up with oncology in June with repeat CT scan at that time.  She  also has a follow-up appoint with oncology later this month.    Osteoporosis March 2019 spine score -2.5/femur score -2.7 with moderate trabecular bone.  She is no longer on Fosamax.  Previous sacral fracture in 2012.  He has an appointment next month to discuss possible Prolia.    Heartburn is not well controlled currently on Pepcid.  She complains of a heartburn type feeling especially after eating or at night.  She also has a painful swallowing sensation.    No blood in stool or melena.  No lower abdominal pain.    No chest pressure type pain.    No flare of her chronic sinusitis.    She does not use home oxygen.  She does use her DuoNeb and Advair.  She has a pulmonary appointment within a couple weeks.    She denies fever or acute change in her sputum.  No hemoptysis.    She has chronic left hip pain with left hip scar.  Left hip replacement in 2015 with redo in fracture and bone graft.  She had been doing regular physical exercises but recently has been sitting in her recliner chair for long periods of time and not doing exercises.  She is been having worsening pain.  Uses a Lidoderm patch.  No radicular type pain.    Heterozygous for factor V Leiden but no history of pulmonary embolism.  She is just on aspirin prophylaxis.    History of occipital stroke with left median occipital infarct and visual field cut.  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  No new neurologic event.    He is on simvastatin 40 mg.    Chronic dysthymia on Effexor and trazodone.    Overactive bladder, not discussed today.    She did have shingles December 2018.    PMHx:    Past Medical History:   Diagnosis Date     Arrhythmia      Breast cancer (H) 2017     CHF (congestive heart failure) (H)      COPD (chronic obstructive pulmonary disease) (H)      Coronary artery disease      GERD (gastroesophageal reflux disease)      Hyperlipidemia      Hypertension      Idiopathic  cardiomyopathy (H)      PSHx:    Past Surgical History:   Procedure Laterality Date     BREAST BIOPSY Right 2017     BREAST LUMPECTOMY Left 06/2017    x2     CARDIAC CATHETERIZATION       CATARACT EXTRACTION Left 07/18/2017     WI OPEN TX FEMORAL FRACTURE DISTAL MED/LAT CONDYLE Left 10/28/2015    Procedure: OPEN REDUCTION INTERNAL FIXATION LEFT  PROXIMAL FEMUR PERIPROSTHETIC FRACTURE;  Surgeon: Yovanny Albarran MD;  Location: [facility];  Service: Orthopedics     REVISION TOTAL HIP ARTHROPLASTY Left      Immunizations:   Immunization History   Administered Date(s) Administered     Influenza [id number], 01/05/2010     Influenza high dose,seasonal,PF, 65+ yrs 10/30/2014, 10/01/2015, 11/26/2016     Influenza, Seasonal, Inj PF IIV3 11/03/2003     Influenza, inj, historic,unspecified 10/01/2016, 10/01/2018     Influenza,A3S7-75,Pandemic,split,IM 01/05/2010     Influenza,seasonal, Inj IIV3 11/03/2003, 11/08/2006, 10/24/2007, 11/28/2008, 10/09/2009, 10/22/2010, 01/26/2012, 10/03/2012, 11/01/2013     Influenza,trivalent,im, 65+yrs 09/20/2017, 09/25/2018, 10/10/2019     Pneumo Conj 13-V (2010&after) 05/11/2015     Pneumo Polysac 23-V 11/24/1997, 11/21/2007, 05/17/2017     Td, adult adsorbed, PF 07/20/2004     Tdap 02/12/2016     ZOSTER, LIVE 02/18/2009, 10/20/2014       ROS A comprehensive review of systems was performed and was otherwise negative    Medications, allergies, and problem list were reviewed and updated    Exam  /70 (Patient Site: Left Arm, Patient Position: Sitting, Cuff Size: Adult Regular)   Pulse 60   Wt 149 lb 8 oz (67.8 kg)   LMP 11/02/1992   SpO2 (!) 88%   BMI 27.34 kg/m    Moderate kyphosis.  Her lungs are actually fairly clear.  There is no wheezing or crackles.  Some reduced respiratory excursion and decreased breath sounds throughout.  Heart is regular without ectopy.  I did not hear a murmur.  There is no ankle edema and abdomen is nontender.  Able to climb up on the exam  table.    Her hands were mildly cold but her O2 sat was around 88 to 90% on room air at rest.    Assessment/Plan  1. Pulmonary emphysema, unspecified emphysema type (H)  Severe with some increased dyspnea on exertion and hypoxia today.    She has a mild cough but not severe rhonchi in her lungs sound fairly clear.    Patient was told to seek medical attention if any worsening of her respiratory condition.  She will go home and take a DuoNeb currently.  She sees her pulmonary medicine doctor in a few weeks.    She is not currently on oxygen but could get evaluated for supplemental oxygen based on today's O2 sat numbers.    Continue Advair.    Also will be treating her congestive heart failure condition.    Factor V Leiden heterozygous mutation but no history of DVT or PE.  There is no new leg pain or edema.  Suspect the hypoxia associated with her emphysema currently.    2. Cardiomyopathy, idiopathic (H)  Unclear etiology of her heart failure.  Possibly broken heart condition the death of her father.    She does not believe she has sleep apnea and did not want a referral to the sleep clinic but I did discuss that with her today.    Cardiac condition is stable.  She does have evidence of cardiac strain with high BNP.  Her blood pressures been running too high recently.    Increase losartan to 50 mg a day.  I did discuss risk of affecting kidney function, lightheaded dizzy spells or syncope risk.  Follow-up in 2 weeks for reevaluation of kidney function and potassium.    Also sees me in February for follow-up.    She will continue on 20 mg of furosemide which was increased back to that in November.    She is also on the higher dose of metoprolol XL 75 mg a day, heart rate limiting that at this time.    3. Cough  Chronic bronchitis/emphysema.  Follow-up if worsening symptoms.    I would have her restart the azithromycin if worsening respiratory symptoms.  - benzonatate (TESSALON) 200 MG capsule; Take 1 capsule (200 mg  total) by mouth 3 (three) times a day.  Dispense: 60 capsule; Refill: 2    4. S/P hip replacement, left  Chronic severe scar tissue after multiple surgeries on her left hip.  She is not been active or doing her exercises, sitting in recliner.  Is appears she is tightened up with significant myofascial pain now.    Needs to restart physical therapy but I will have her reevaluated at the orthopedic clinic first.  - lidocaine (LIDODERM) 5 %; Place 1 patch on the skin daily. To left hip  Dispense: 30 patch; Refill: 3  - Ambulatory referral to Orthopedic Surgery    5. Hip pain, left  As above  - Ambulatory referral to Orthopedic Surgery    6. History of stroke  She does have evidence of significant vascular disease.    Also mild coronary artery disease.    Simvastatin 40 mg and aspirin daily.    7. History of invasive breast cancer  No evidence of recurrence post lumpectomy and radiation in 2017.    Yearly mammogram in May.    Follow-up with oncology in January and then also in June with plan CT scan in June.    8. Osteoporosis without current pathological fracture, unspecified osteoporosis type  Meet with Dr. Shane in February to discuss possible Prolia.  Previous 10 years of Fosamax.    Last bone density in March.    I stressed the importance of daily walking which she needs to improve with.    9. Heartburn  Restart Protonix.  Continue Pepcid.    Painful swallowing, if that does not get better may need to consider EGD.  - pantoprazole (PROTONIX) 40 MG tablet; Take 1 tablet (40 mg total) by mouth daily.  Dispense: 90 tablet; Refill: 2    10. Essential hypertension  As above  - losartan (COZAAR) 25 MG tablet; Take 2 tablets (50 mg total) by mouth daily.  Dispense: 90 tablet; Refill: 1    11. Encounter for therapeutic drug monitoring    - Basic Metabolic Panel    She was reminded to make her routine ophthalmology exam this winter.    Dysthymia insomnia, controlled on Effexor and trazodone.    Overactive bladder, not  discussed.    Return in about 2 weeks (around 1/16/2020) for Recheck.   Patient Instructions   Continues to use your DuoNeb and inhalers.  Meet with your lung doctor later this month as planned.    If you have progressive worsening shortness of breath or increasing cough, seek medical attention to consider going back on azithromycin, and possibly starting with supplementary oxygen treatment.    Increase losartan to 50 mg a day to reduce blood pressure and reduce strain on your heart failure.    Checking potassium and kidney labs today prior to increase of losartan.    Follow-up in 2 weeks to reevaluate kidney labs and blood pressure.    Also see me in February for follow-up.    See ophthalmology this winter for routine checkup.    You have been referred back to orthopedic clinic to address your left hip pain.  You need to do daily stretching exercises and walking.  I would recommend restarting physical therapy after your evaluation with the orthopedic clinic.    Start pantoprazole/Protonix on a daily basis in addition to your famotidine to help reduce heartburn.    Pankaj Montanez MD        Current Outpatient Medications   Medication Sig Dispense Refill     acetaminophen (TYLENOL) 500 MG tablet Take 1-2 tablets (500-1,000 mg total) by mouth every 6 (six) hours as needed.  0     acetaminophen-codeine (TYLENOL #3) 300-30 mg per tablet TK 1 T PO Q 4 TO 6  H PRN P       ascorbic acid, vitamin C, (VITAMIN C) 1000 MG tablet Take 1,000 mg by mouth daily.       aspirin 81 MG EC tablet Take 81 mg by mouth daily.       azelastine (ASTELIN) 137 mcg (0.1 %) nasal spray 2 sprays into each nostril daily.       chlorhexidine (PERIDEX) 0.12 % solution Apply 15 mL to the mouth or throat at bedtime.              cholecalciferol, vitamin D3, 1,000 unit tablet Take 1,000 Units by mouth daily.       clindamycin (CLEOCIN) 300 MG capsule TK 1 C 4 TIMES PER DAY UNTIL GONE       cyanocobalamin, vitamin B-12, (VITAMIN B-12 ORAL) Take 1,000  mcg by mouth daily.              famotidine (PEPCID) 20 MG tablet Take 1 tablet by mouth 2 (two) times a day.       fluticasone (FLONASE) 50 mcg/actuation nasal spray Apply 1 spray into each nostril at bedtime.       fluticasone-salmeterol (ADVAIR) 500-50 mcg/dose DISKUS Inhale 1 puff 2 (two) times a day.       furosemide (LASIX) 20 MG tablet Take 10 mg by mouth daily.              gabapentin (NEURONTIN) 300 MG capsule Take 1 capsule (300 mg total) by mouth 2 (two) times a day. (Patient taking differently: Take 600 mg by mouth 2 (two) times a day. ) 180 capsule 1     ipratropium (ATROVENT HFA) 17 mcg/actuation inhaler Inhale 2 puffs every 6 (six) hours. 3 Inhaler 3     ipratropium-albuterol (DUO-NEB) 0.5-2.5 mg/3 mL nebulizer 1 neb 4 times a day until seen by primary and then as needed or as directed 60 vial 1     losartan (COZAAR) 25 MG tablet Take 2 tablets (50 mg total) by mouth daily. 90 tablet 1     metoprolol succinate (TOPROL-XL) 50 MG 24 hr tablet Take 1.5 tablets (75 mg total) by mouth daily. 135 tablet 3     mirabegron (MYRBETRIQ) 50 mg Tb24 ER tablet Take 1 tablet (50 mg total) by mouth daily. 90 tablet 1     PROVENTIL HFA 90 mcg/actuation inhaler Inhale 1 puff every 6 (six) hours as needed.        simvastatin (ZOCOR) 40 MG tablet Take 40 mg by mouth bedtime.       solifenacin (VESICARE) 5 MG tablet Take 1 tablet (5 mg total) by mouth at bedtime. 30 tablet 0     tamoxifen (NOLVADEX) 20 MG tablet TAKE 1 TABLET DAILY 90 tablet 4     traZODone (DESYREL) 50 MG tablet Take 1 tablet (50 mg total) by mouth at bedtime as needed. 90 tablet 1     venlafaxine (EFFEXOR-XR) 150 MG 24 hr capsule TAKE 1 CAPSULE DAILY 90 capsule 4     benzonatate (TESSALON) 200 MG capsule Take 1 capsule (200 mg total) by mouth 3 (three) times a day. 60 capsule 2     lidocaine (LIDODERM) 5 % Place 1 patch on the skin daily. To left hip 30 patch 3     pantoprazole (PROTONIX) 40 MG tablet Take 1 tablet (40 mg total) by mouth daily. 90  tablet 2     No current facility-administered medications for this visit.      Allergies   Allergen Reactions     Sulfa (Sulfonamide Antibiotics) Rash     Headaches and dizziness.     Homeopathic Drugs Unknown and Other (See Comments)     Comment: runny nose, Comment: runny nose     Mold Extracts      Morphine (Pf)      hallucinate     Lisinopril Cough, Unknown and Itching     Comment: cough, Comment: cough     Sulfacetamide Sodium Rash     Social History     Tobacco Use     Smoking status: Former Smoker     Last attempt to quit: 10/28/2007     Years since quittin.1     Smokeless tobacco: Former User     Quit date: 2004   Substance Use Topics     Alcohol use: No     Drug use: No

## 2021-06-05 VITALS
OXYGEN SATURATION: 92 % | DIASTOLIC BLOOD PRESSURE: 65 MMHG | HEART RATE: 103 BPM | SYSTOLIC BLOOD PRESSURE: 90 MMHG | TEMPERATURE: 97.7 F | RESPIRATION RATE: 20 BRPM | HEIGHT: 60 IN | WEIGHT: 130.8 LBS | BODY MASS INDEX: 25.68 KG/M2

## 2021-06-05 VITALS
HEART RATE: 58 BPM | WEIGHT: 132.6 LBS | TEMPERATURE: 98.3 F | BODY MASS INDEX: 25.9 KG/M2 | DIASTOLIC BLOOD PRESSURE: 85 MMHG | SYSTOLIC BLOOD PRESSURE: 145 MMHG

## 2021-06-05 VITALS
HEIGHT: 60 IN | OXYGEN SATURATION: 91 % | DIASTOLIC BLOOD PRESSURE: 62 MMHG | RESPIRATION RATE: 16 BRPM | TEMPERATURE: 98.1 F | SYSTOLIC BLOOD PRESSURE: 137 MMHG | WEIGHT: 142.4 LBS | BODY MASS INDEX: 27.96 KG/M2

## 2021-06-05 VITALS
OXYGEN SATURATION: 93 % | WEIGHT: 141.9 LBS | SYSTOLIC BLOOD PRESSURE: 152 MMHG | HEIGHT: 60 IN | BODY MASS INDEX: 27.86 KG/M2 | DIASTOLIC BLOOD PRESSURE: 78 MMHG | HEART RATE: 92 BPM

## 2021-06-05 VITALS
HEIGHT: 62 IN | HEART RATE: 64 BPM | BODY MASS INDEX: 25.96 KG/M2 | DIASTOLIC BLOOD PRESSURE: 62 MMHG | WEIGHT: 141.1 LBS | SYSTOLIC BLOOD PRESSURE: 128 MMHG | RESPIRATION RATE: 16 BRPM

## 2021-06-05 VITALS
HEIGHT: 60 IN | SYSTOLIC BLOOD PRESSURE: 142 MMHG | RESPIRATION RATE: 18 BRPM | HEART RATE: 68 BPM | BODY MASS INDEX: 26.42 KG/M2 | WEIGHT: 134.6 LBS | TEMPERATURE: 98.2 F | OXYGEN SATURATION: 92 % | DIASTOLIC BLOOD PRESSURE: 86 MMHG

## 2021-06-05 VITALS
HEIGHT: 62 IN | BODY MASS INDEX: 26.55 KG/M2 | OXYGEN SATURATION: 88 % | DIASTOLIC BLOOD PRESSURE: 70 MMHG | WEIGHT: 144.3 LBS | SYSTOLIC BLOOD PRESSURE: 140 MMHG | HEART RATE: 72 BPM

## 2021-06-05 VITALS
DIASTOLIC BLOOD PRESSURE: 62 MMHG | BODY MASS INDEX: 26.12 KG/M2 | SYSTOLIC BLOOD PRESSURE: 116 MMHG | WEIGHT: 142.8 LBS | HEART RATE: 64 BPM

## 2021-06-05 VITALS
HEART RATE: 85 BPM | BODY MASS INDEX: 25.9 KG/M2 | WEIGHT: 132.6 LBS | TEMPERATURE: 98.1 F | SYSTOLIC BLOOD PRESSURE: 107 MMHG | DIASTOLIC BLOOD PRESSURE: 70 MMHG

## 2021-06-05 VITALS
DIASTOLIC BLOOD PRESSURE: 70 MMHG | HEART RATE: 90 BPM | SYSTOLIC BLOOD PRESSURE: 150 MMHG | HEIGHT: 60 IN | BODY MASS INDEX: 27.58 KG/M2 | OXYGEN SATURATION: 94 %

## 2021-06-05 VITALS
WEIGHT: 130.8 LBS | SYSTOLIC BLOOD PRESSURE: 114 MMHG | BODY MASS INDEX: 25.55 KG/M2 | TEMPERATURE: 97.8 F | DIASTOLIC BLOOD PRESSURE: 69 MMHG | HEART RATE: 64 BPM

## 2021-06-05 VITALS
OXYGEN SATURATION: 90 % | RESPIRATION RATE: 20 BRPM | HEIGHT: 60 IN | DIASTOLIC BLOOD PRESSURE: 78 MMHG | BODY MASS INDEX: 26.03 KG/M2 | TEMPERATURE: 98.3 F | WEIGHT: 132.6 LBS | SYSTOLIC BLOOD PRESSURE: 144 MMHG | HEART RATE: 80 BPM

## 2021-06-05 VITALS
WEIGHT: 142.8 LBS | BODY MASS INDEX: 27.89 KG/M2 | SYSTOLIC BLOOD PRESSURE: 137 MMHG | HEART RATE: 82 BPM | DIASTOLIC BLOOD PRESSURE: 62 MMHG | TEMPERATURE: 98.1 F

## 2021-06-05 VITALS
DIASTOLIC BLOOD PRESSURE: 60 MMHG | HEIGHT: 62 IN | RESPIRATION RATE: 16 BRPM | HEART RATE: 49 BPM | BODY MASS INDEX: 25.4 KG/M2 | WEIGHT: 138 LBS | SYSTOLIC BLOOD PRESSURE: 95 MMHG

## 2021-06-05 NOTE — TELEPHONE ENCOUNTER
Spoke with pt that she has not had any cardiac procedures and can stop pepcid from cards point of view. Pt states she has been off for several days and has no issues. She will go to local drug store and get OTC if she has issues.

## 2021-06-05 NOTE — PROGRESS NOTES
"Patient here for new patient evaluation with Dr Mayo for left leg, left lateral thigh pain. Patient reports that she had a hip replacement \"several years ago\"  Also had a fall, \"fracturing her left greater trochanter and spiral fracture of her left femur.\"  After all of theses surgeries she is left with persistent leg leg, lateral thigh pain which she rates a 6.   "

## 2021-06-05 NOTE — PROGRESS NOTES
"Assessment/Plan:     Problem List Items Addressed This Visit     Hip pain, left - Primary    Relevant Orders    Ambulatory referral to Physical Therapy      Other Visit Diagnoses     Chronic pain syndrome        Relevant Orders    Pain Management Drug Panel, Urine              No follow-ups on file.    Patient Instructions   PLAN:    Baseline Urine Drug Test and controlled substance agreement    Discussed electromagnetic therapy for scar tissue       May contact Gemini Han, has advanced training, does not take insurance email [email or web address]    Transitions in Health, may take [facility] [phone]    Chiropractor Dr. Nomi Gauthier [phone], will take Medicare    Referral to Physical Therapy for myofascial release to help with scar tissue        Dr. Mayo may prescribe oxycodone 5 mg twice a day after review labs    Return in 8 weeks        Subjective:       74 y.o. female presents for evaluation of hip pain, referred by Aakash Flores CNP    Marti 4-year-old female living in [place] with her  is retired from the [employer].  She is retired working from [employer].  Has 2 adult children.    HPI: Aakash Hernandez 10/30/2019 note indicates she had been followed by orthopedics, with a history of left hip replacement.  She has been on tramadol 50 mg twice a day for several years.  He indicated she would need to be seen by a pain clinic.    There are no orthopedic records found in care everywhere or in our epic.    She reports 5 or 6 years ago having a left hip replacement after developing left hip pain having injections.  Ports that went well.  She was being treated with physical therapy having some \"piriformis issues.  Arab 1 year later she fell down steps, broke her greater trochanteric care and femur.  She had had a surgery with a elizabeth placed.  She describes over the years developing scar tissue around this area with significant pain.  She has had physical therapy for several years which did not help.  " "She has been followed by [facility] orthopedics told there is no further surgery.    More recently she has been helping \"piriformis\" with concerns with her right side.  They have not described yet right hip surgery.    She describes it as a deep pain on the left side, worse with walking, getting up and down with chairs.  Sometimes there is spasms.    Has had a TENS unit which did not help.  Has not tried acupuncture.  Nonsteroidals do not help.    She is use gabapentin 300 mg twice a day which helps her other lower back pain.    Recalls using oxycodone in the past 5 mg twice a day which seem to \"take the edge off\".  Describes nobody wants to continue to prescribe.  Denies any problems with use or side effects.    Reviews the tramadol was not particular helpful.  Leidig cane patch has not been helpful.    Review she used to enjoy exercising and walking, has gained some 15 pounds for which she is concerned.    Sleep is poor though trazodone is helpful.  Appetite is good.  Her energy varies depending on her pain.  She notes her mood is okay though if the pain is bad she can be \"cranky\".  Denies particular depression.    Reviewing the med list she is on duloxetine 1 of 50 mg daily.  That has been added for side effects of the tamoxifen.  Has not helped the pain.    Has done pool therapy in the past with some benefit.    She has tried supplements such as cucurmin without benefit.  Has not tried CBD .    Substance use history alcohol is not been a problem.  She does not smoke cigarettes.  Denies other drug use.    Medical history breast cancer on tamoxifen.    Has had COPD exacerbations in the hospital.  Does not have sleep apnea.  Was on oxycodone during the time that she had COPD problems without any respiratory compromise by her report      Current Outpatient Medications:      acetaminophen (TYLENOL) 500 MG tablet, Take 1-2 tablets (500-1,000 mg total) by mouth every 6 (six) hours as needed., Disp: , Rfl: 0     " acetaminophen-codeine (TYLENOL #3) 300-30 mg per tablet, TK 1 T PO Q 4 TO 6  H PRN P, Disp: , Rfl:      ascorbic acid, vitamin C, (VITAMIN C) 1000 MG tablet, Take 1,000 mg by mouth daily., Disp: , Rfl:      aspirin 81 MG EC tablet, Take 81 mg by mouth daily., Disp: , Rfl:      azelastine (ASTELIN) 137 mcg (0.1 %) nasal spray, 2 sprays into each nostril daily., Disp: , Rfl:      benzonatate (TESSALON) 200 MG capsule, Take 1 capsule (200 mg total) by mouth 3 (three) times a day., Disp: 60 capsule, Rfl: 2     chlorhexidine (PERIDEX) 0.12 % solution, Apply 15 mL to the mouth or throat at bedtime.    , Disp: , Rfl:      cholecalciferol, vitamin D3, 1,000 unit tablet, Take 1,000 Units by mouth daily., Disp: , Rfl:      cyanocobalamin, vitamin B-12, (VITAMIN B-12 ORAL), Take 1,000 mcg by mouth daily.    , Disp: , Rfl:      famotidine (PEPCID) 20 MG tablet, Take 1 tablet by mouth 2 (two) times a day., Disp: , Rfl:      fluticasone (FLONASE) 50 mcg/actuation nasal spray, Apply 1 spray into each nostril at bedtime., Disp: , Rfl:      fluticasone-salmeterol (ADVAIR) 500-50 mcg/dose DISKUS, Inhale 1 puff 2 (two) times a day., Disp: , Rfl:      furosemide (LASIX) 20 MG tablet, Take 10 mg by mouth daily.    , Disp: , Rfl:      gabapentin (NEURONTIN) 300 MG capsule, Take 1 capsule (300 mg total) by mouth 2 (two) times a day. (Patient taking differently: Take 600 mg by mouth 2 (two) times a day. ), Disp: 180 capsule, Rfl: 1     ipratropium (ATROVENT HFA) 17 mcg/actuation inhaler, Inhale 2 puffs every 6 (six) hours., Disp: 3 Inhaler, Rfl: 3     ipratropium-albuterol (DUO-NEB) 0.5-2.5 mg/3 mL nebulizer, 1 neb 4 times a day until seen by primary and then as needed or as directed, Disp: 60 vial, Rfl: 1     lidocaine (LIDODERM) 5 %, Place 1 patch on the skin daily. To left hip, Disp: 30 patch, Rfl: 3     losartan (COZAAR) 25 MG tablet, Take 2 tablets (50 mg total) by mouth daily., Disp: 90 tablet, Rfl: 1     metoprolol succinate  (TOPROL-XL) 50 MG 24 hr tablet, Take 1.5 tablets (75 mg total) by mouth daily., Disp: 135 tablet, Rfl: 3     mirabegron (MYRBETRIQ) 50 mg Tb24 ER tablet, Take 1 tablet (50 mg total) by mouth daily., Disp: 90 tablet, Rfl: 1     pantoprazole (PROTONIX) 40 MG tablet, Take 1 tablet (40 mg total) by mouth daily., Disp: 90 tablet, Rfl: 2     PROVENTIL HFA 90 mcg/actuation inhaler, Inhale 1 puff every 6 (six) hours as needed. , Disp: , Rfl:      simvastatin (ZOCOR) 40 MG tablet, Take 40 mg by mouth bedtime., Disp: , Rfl:      solifenacin (VESICARE) 5 MG tablet, Take 1 tablet (5 mg total) by mouth at bedtime., Disp: 30 tablet, Rfl: 0     tamoxifen (NOLVADEX) 20 MG tablet, TAKE 1 TABLET DAILY, Disp: 90 tablet, Rfl: 4     traZODone (DESYREL) 50 MG tablet, Take 1 tablet (50 mg total) by mouth at bedtime as needed., Disp: 90 tablet, Rfl: 1     venlafaxine (EFFEXOR-XR) 150 MG 24 hr capsule, TAKE 1 CAPSULE DAILY, Disp: 90 capsule, Rfl: 4  Allergies   Allergen Reactions     Sulfa (Sulfonamide Antibiotics) Rash     Headaches and dizziness.     Homeopathic Drugs Unknown and Other (See Comments)     Comment: runny nose, Comment: runny nose     Mold Extracts      Morphine (Pf)      hallucinate     Lisinopril Cough, Unknown and Itching     Comment: cough, Comment: cough     Sulfacetamide Sodium Rash         Developmental history raised in [place], third of 6 siblings.  Father worked for [employer] and mother for [employer].  Described a good developmental history.  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  Hobbies include painting.  She is active in her Spiritism.    She is alert with a clear sensorium good eye contact.  Thought process tight logical.  Affect is full range.    She puts her arms on the arms of the chair to rise from seated position.  Ambulates with a limp.    On exam her left lateral mid thigh area is tender to touch.         Objective:     Vitals:    01/07/20 1204  "  BP: 126/72   Pulse: 64   Weight: 153 lb 3.2 oz (69.5 kg)   Height: 5' 2\" (1.575 m)   PainSc:   6   PainLoc: Leg   LMP: 11/02/1992       Impression: History of left hip replacement, with a subsequent fracture of greater trochanter and for femur with elizabeth placement.  She describes since then developing scar tissue with a deep pain.  It is decreased her mobility.  She has concern of weight gain with decreased activity.    Does have comorbid problems with COPD.    She reports the use of oxycodone helped with her activity level.  Tried physical therapy and other interventions.    Otf: Reviewed electromagnetic therapies including frequency specific microcurrent which is been used for scar tissue.  Resources were reviewed.  She is concerned about finances.    Reviewed doing these therapies in conjunction with myofascial release and she does not recall the kind of therapy.    I will obtain a baseline urine drug screen and controlled substance agreement.  She has give me permission to speak with her  for his perspective.  May prescribe oxycodone after review as it was helpful for her improving function.    I did reach the patient's  who noted on oxycodone she was \"good\", could go up and down stairs, is less functional now.  He had no concern about her use.  Did not recall any medical concerns or breathing concerns.    Time spent more than 45 minutes face-to-face, more than 50% counseling about above condition coordination treatment plan      This note has been dictated using voice recognition software. Any grammatical or context distortions are unintentional and inherent to the software  "

## 2021-06-05 NOTE — TELEPHONE ENCOUNTER
----- Message from Heather Swanson sent at 1/28/2020  1:15 PM CST -----  The medication or refill issue is below:    Primary Cardiologist: Dr. Chilel  Medication: famotidine (PEPCID) 20 MG tablet   Issue / Concern: completely out  Preferred Pharmacy: [facility] - [address] Phone Number for Patient: [phone]

## 2021-06-05 NOTE — PROGRESS NOTES
Clinic Note    Assessment:     Assessment and Plan:  1. Essential hypertension  Now controlled after increasing losartan to 50 mg.  Recheck BMP today.  BP today 124/80    2. Rash  - triamcinolone (KENALOG) 0.1 % cream; Apply to rash on arms twice daily for one week  Dispense: 30 g; Refill: 0         Patient Instructions   Refills of losartan sent into mail order pharmacy.  It is a 50 mg tablet.  Take 1 daily.    Blood pressure and other vital signs look good today.    I sent a prescription for triamcinolone steroid cream to your [facility] pharmacy.  Apply a small amount to your rashes twice daily.  I would not use this for longer than 7 to 10 days as it can thin your skin.    Send me a Storie message or call the clinic if your rash persists.  We can place a referral to dermatology at that point.    Recheck kidney labs and electrolytes today.    Follow-up with Dr. Shane in February to discuss osteoporosis.    Follow-up with Dr. Pankaj Montanez in February for routine checkup.2    Return in about 1 month (around 2/15/2020).         Subjective:      Irene León is a 74 y.o. female who comes to the clinic today for BP recheck.    She saw her PCP, Dr. Pankaj Montanez on 1/2.    At that time, her blood pressure was recorded at 150/70.  She was told to increase her losartan to 50 mg/day.  She is on furosemide 20 mg.  Also using metoprolol XL 75 mg/day.    She has been checking her blood pressure regularly at home with most measurements in the 1 teens to 120s over 80s range.  Today, 124/80.    She has a rash on her bilateral wrists.  Does not seem to be responding to moisturizers or hydrocortisone 1%.  Quite itchy.  Does not seem to be spreading.    The following portions of the patient's history were reviewed and updated as appropriate: Allergies, medications, problem list, prior note.     Review of Systems:    Review is otherwise negative except for what is mentioned above.     Social Hx:    Social History     Tobacco  Use   Smoking Status Former Smoker     Last attempt to quit: 10/28/2007     Years since quittin.2   Smokeless Tobacco Former User     Quit date: 2004         Objective:     Vitals:    01/15/20 1048   BP: 124/80   Pulse: 62   SpO2: 93%       Exam:    General: No apparent distress. Calm. Alert and Oriented X3. Pt behavior is appropriate.  Chest/Lungs: Normal chest wall, clear to auscultation, normal respiratory effort and rate.   Heart/Pulses: Regular rate and rhythm, strong and equal radial pulses, no murmurs. Capillary refill <2 seconds. No edema.   Skin: Patchy erythematous rash on bilateral wrists.  No streaking.  Noninfectious appearing      Patient Active Problem List   Diagnosis     Leg pain, left     Femur fracture (H)     Hypertension     Pulmonary emphysema, unspecified emphysema type (H)     Hyperlipidemia     Bladder incontinence     Cardiomyopathy, idiopathic (H)     Depression with anxiety     Family history of blood disease     GERD (gastroesophageal reflux disease)     Post menopausal syndrome     OP (osteoporosis)     Osteoarthritis of hip     Insomnia     Dyspnea     Malignant neoplasm of central portion of left female breast (H)     Pleural nodules     Hot flashes due to tamoxifen     COPD exacerbation (H)     Herpes zoster without complication     Chest wall pain     Benign essential hypertension     Dyslipidemia     Acute respiratory failure with hypoxia (H)     Factor 5 Leiden mutation, heterozygous (H)     CKD (chronic kidney disease) stage 3, GFR 30-59 ml/min (H)     Centrilobular emphysema (H)     Anisocoria     Herniated intervertebral disc     Hot flashes, menopausal     OAB (overactive bladder)     Physical deconditioning     Pulsatile tinnitus of both ears     Chest pain     Acute hypoxemic respiratory failure (H)     S/P hip replacement, left     Hip pain, left     Current Outpatient Medications   Medication Sig Dispense Refill     acetaminophen (TYLENOL) 500 MG tablet Take 1-2  tablets (500-1,000 mg total) by mouth every 6 (six) hours as needed.  0     acetaminophen-codeine (TYLENOL #3) 300-30 mg per tablet TK 1 T PO Q 4 TO 6  H PRN P       ascorbic acid, vitamin C, (VITAMIN C) 1000 MG tablet Take 1,000 mg by mouth daily.       aspirin 81 MG EC tablet Take 81 mg by mouth daily.       azelastine (ASTELIN) 137 mcg (0.1 %) nasal spray 2 sprays into each nostril daily.       benzonatate (TESSALON) 200 MG capsule Take 1 capsule (200 mg total) by mouth 3 (three) times a day. 60 capsule 2     chlorhexidine (PERIDEX) 0.12 % solution Apply 15 mL to the mouth or throat at bedtime.              cholecalciferol, vitamin D3, 1,000 unit tablet Take 1,000 Units by mouth daily.       cyanocobalamin, vitamin B-12, (VITAMIN B-12 ORAL) Take 1,000 mcg by mouth daily.              famotidine (PEPCID) 20 MG tablet Take 1 tablet by mouth 2 (two) times a day.       fluticasone (FLONASE) 50 mcg/actuation nasal spray Apply 1 spray into each nostril at bedtime.       fluticasone-salmeterol (ADVAIR) 500-50 mcg/dose DISKUS Inhale 1 puff 2 (two) times a day.       furosemide (LASIX) 20 MG tablet Take 10 mg by mouth daily.              gabapentin (NEURONTIN) 300 MG capsule Take 1 capsule (300 mg total) by mouth 2 (two) times a day. (Patient taking differently: Take 600 mg by mouth 2 (two) times a day. ) 180 capsule 1     ipratropium (ATROVENT HFA) 17 mcg/actuation inhaler Inhale 2 puffs every 6 (six) hours. 3 Inhaler 3     ipratropium-albuterol (DUO-NEB) 0.5-2.5 mg/3 mL nebulizer 1 neb 4 times a day until seen by primary and then as needed or as directed 60 vial 1     lidocaine (LIDODERM) 5 % Place 1 patch on the skin daily. To left hip 30 patch 3     losartan (COZAAR) 50 MG tablet Take 1 tablet (50 mg total) by mouth daily. 90 tablet 0     metoprolol succinate (TOPROL-XL) 50 MG 24 hr tablet Take 1.5 tablets (75 mg total) by mouth daily. 135 tablet 3     mirabegron (MYRBETRIQ) 50 mg Tb24 ER tablet Take 1 tablet (50 mg  total) by mouth daily. 90 tablet 3     oxyCODONE (ROXICODONE) 5 MG immediate release tablet Take 1 tablet (5 mg total) by mouth 2 (two) times a day. 56 tablet 0     pantoprazole (PROTONIX) 40 MG tablet Take 1 tablet (40 mg total) by mouth daily. 90 tablet 2     PROVENTIL HFA 90 mcg/actuation inhaler Inhale 1 puff every 6 (six) hours as needed.        simvastatin (ZOCOR) 40 MG tablet Take 40 mg by mouth bedtime.       solifenacin (VESICARE) 5 MG tablet Take 1 tablet (5 mg total) by mouth at bedtime. 90 tablet 3     tamoxifen (NOLVADEX) 20 MG tablet TAKE 1 TABLET DAILY 90 tablet 4     traZODone (DESYREL) 50 MG tablet Take 1 tablet (50 mg total) by mouth at bedtime as needed. 90 tablet 1     venlafaxine (EFFEXOR-XR) 150 MG 24 hr capsule TAKE 1 CAPSULE DAILY 90 capsule 4     triamcinolone (KENALOG) 0.1 % cream Apply to rash on arms twice daily for one week 30 g 0     No current facility-administered medications for this visit.            Aakash Flores (Rob), SUNITA    1/15/2020

## 2021-06-05 NOTE — TELEPHONE ENCOUNTER
Spoke to patient regarding Dr. Mayo's note and she mentioned that she is waiting for [facility] to giver he a call back.  She also has an appointment for an evaluation on 1/22/2020.  Patient states she will give the clinic a call if she needs anything further.

## 2021-06-05 NOTE — PATIENT INSTRUCTIONS - HE
Prolia 1st today.  Prolia 2st in 6 months with my nurse. I will see you in 1 year.    DXA in 1 year .   Phone number to schedule [phone].    Daily calcium need is 4566-1105 mg a day from the diet and supplements.  Calcium citrate is easier to digest.  Vitamin D 2000 IU daily recommended.

## 2021-06-05 NOTE — PATIENT INSTRUCTIONS - HE
Irene León,    It was a pleasure to see you today at [facility].     My recommendations after this visit include:  - Please follow up with Dr Chilel in May   - Please follow up with Samantha Herrera in 6 months  - No changes to your medications    Samantha Herrera, CNP

## 2021-06-05 NOTE — TELEPHONE ENCOUNTER
Patient left a message stating that she was in to see Dr. Mayo today and they discussed her looking into where she would like to go for myofascial release.  Patient would like a referral sent to [facility]. Once they receive the referral she will be able to make an appointment. If you have any questions you can call her back at [phone]

## 2021-06-05 NOTE — PATIENT INSTRUCTIONS - HE
PLAN:    Baseline Urine Drug Test and controlled substance agreement    Discussed electromagnetic therapy for scar tissue       May contact Gemini Han, has advanced training, does not take insurance email [email or web address]    Transitions in Health, may take [facility] [phone]    Chiropractor Dr. Nomi Gauthier [phone], will take Medicare    Referral to Physical Therapy for myofascial release to help with scar tissue        Dr. Mayo may prescribe oxycodone 5 mg twice a day after review labs    Return in 8 weeks

## 2021-06-05 NOTE — PROGRESS NOTES
[facility] Certification Request    January 22, 2020      Patient: Irene León  MR Number: [id number]  YOB: 1945  Date of Visit: 1/22/2020      Dear Dr. Mayo    Thank you for this referral.   We are seeing Irene León for Physical Therapy of left hip.    Medicare and/or Medicaid requires physician review and approval of the treatment plan. Please review the plan of care and verify that you agree with the therapy plan of care by co-signing this note.      Plan of Care  Authorization / Certification Start Date: 01/22/20  Authorization / Certification End Date: 04/21/20  Authorization / Certification Number of Visits: No visit limit per ICT cert. required  Communication with: Referral Source;Patient Caregiver  Patient Related Instruction: Nature of Condition;Treatment plan and rationale;Self Care instruction;Basis of treatment;Body mechanics;Posture;Next steps;Expected outcome  Times per Week: 2  Number of Weeks: 6  Number of Visits: 12  Discharge Planning: Patient will discharge home upon meeting of goals.   Therapeutic Exercise: ROM;Stretching;Strengthening  Neuromuscular Reeducation: kinesio tape;posture;balance/proprioception;core  Manual Therapy: soft tissue mobilization;myofascial release;joint mobilization;muscle energy  Modalities: TENS;electrical stimulation;hot pack;cold pack      Goals:  Pt. will demonstrate/verbalize independence in self-management of condition in : 6 weeks  Pt. will be independent with home exercise program in : 6 weeks  Pt. will be able to walk : 20 minutes;around the house;with less pain;for household mobility;in 6 weeks    Pt will: demonstrate an ability to perform a sit to stand from a chair with left hip pain less than 3/10 within 6 weeks so that she can tolerate getting up and down from a chair at home   Pt will: Patient will improve her sit to stand score to >=10 reps on the 30 second sit to  order to reduce her fall risk within 6 weeks.          If you have any questions or concerns, please don't hesitate to call.    Sincerely,      Audrey Espana, PT        Physician recommendation:     ___ Follow therapist's recommendation        ___ Modify therapy      *Physician co-signature indicates they certify the need for these services furnished within this plan and while under their care.    [facility]   Hip Initial Evaluation    Patient Name: Irene León  Date of evaluation: 1/22/2020  Referral Diagnosis: Hip pain, left  Referring provider: Apolinar Mayo*  Visit Diagnosis:     ICD-10-CM    1. Chronic left hip pain M25.552     G89.29    2. Abnormality of gait R26.9    3. Chronic bilateral low back pain without sciatica M54.5     G89.29    4. Muscle weakness (generalized) M62.81    5. Weakness of left hip R29.898        Assessment:      Patient is a 74 year old female who presents to PT with low back pain and left hip pain. She has had this left hip pain for the last 4-5 years following surgeries of a RAY and repair of a greater trochanter and femur fracture. Patient presents to therapy with impairments in LE strength, soft tissue tenderness, pain in the left hip and lumbar region, and functional mobility limitations with decreased balance. Patient will benefit from skilled therapy in order to make improvements in these areas. Therapy initiated exercises today and patient was able to tolerate the exercises well.     Pt. is appropriate for skilled PT intervention as outlined in the Plan of Care (POC).    Goals:  Pt. will demonstrate/verbalize independence in self-management of condition in : 6 weeks  Pt. will be independent with home exercise program in : 6 weeks  Pt. will be able to walk : 20 minutes;around the house;with less pain;for household mobility;in 6 weeks    Pt will: demonstrate an ability to perform a sit to stand from a chair with left hip pain less than 3/10 within 6 weeks so that she can tolerate getting up and down from  a chair at home   Pt will: Patient will improve her sit to stand score to >=10 reps on the 30 second sit to  order to reduce her fall risk within 6 weeks.       Patient's expectations/goals are realistic.    Barriers to Learning or Achieving Goals:  No Barriers.       Plan / Patient Instructions:        Plan of Care:   Authorization / Certification Start Date: 01/22/20  Authorization / Certification End Date: 04/21/20  Authorization / Certification Number of Visits: No visit limit per ICT cert. required  Communication with: Referral Source;Patient Caregiver  Patient Related Instruction: Nature of Condition;Treatment plan and rationale;Self Care instruction;Basis of treatment;Body mechanics;Posture;Next steps;Expected outcome  Times per Week: 2  Number of Weeks: 6  Number of Visits: 12  Discharge Planning: Patient will discharge home upon meeting of goals.   Therapeutic Exercise: ROM;Stretching;Strengthening  Neuromuscular Reeducation: kinesio tape;posture;balance/proprioception;core  Manual Therapy: soft tissue mobilization;myofascial release;joint mobilization;muscle energy  Modalities: TENS;electrical stimulation;hot pack;cold pack      Plan for next visit: Initiate MT including MFR and other scar tissue mobilizations at next visit.  Progress strengthening exercises with soft tissue massage in order to improve the patient's pain rating and improve her overall LE mechanics so that she can walk with less pain.      Subjective:       74 year old female who presents to therapy with complaints of low back and left hip pain. Date of onset/duration of symptoms is June 2016. Symptoms are intermittent, but she reports past symptoms ever since her RAY in 2015.  Patient's previous level of function was independent.  Patient notes she had a RAY on the left side in 2015. She had a fall in 2016 where she fell down the stairs. She fractured her greater trochanter and femur. She is in PT today for left hip pain that has  persisted ever since the injury. Patient also complains of lower back pain starting recently.   Patient states she has a lot of scar tissue. Patient had PT this past year at [facility] and keeps up with her exercise routine 2x a day. Patient was performing band exercises for strengthening of the quad and hip musculature. Sitting and walking are both provacative of the patient's left hip pain.     Pain Ratin/10 today  Pain rating at best: 0  Pain rating at worst: 9  Pain description: burning    Functional limitations are described as occurring with:   performing routine daily activities    Patient reports benefit from:  cold       Objective:    30 second sit to stand - 6 times   She weight bears the majority of her weight on the right leg.  When performing a sit to stand the patient puts the majority of her weight on the right leg, shifts her hips to the left, and uses her arms to assist in helping her stand up.     Note: Items left blank indicates the item was not performed or not indicated at the time of the evaluation.    Patient Outcome Measures :        Hip Examination  1. Chronic left hip pain     2. Abnormality of gait     3. Chronic bilateral low back pain without sciatica     4. Muscle weakness (generalized)     5. Weakness of left hip       Involved Side: Left  Posture Observation:      General standing posture is fair.  Assistive Device: None  Gait Observation: Patient ambulates with mild forward trunk lean and a limp on the left side.  Lumbar Clearing: Does not provoke symptoms    Hip ROM:    Date: 2020     Hip ROM( ) AROM in degrees AROM in degrees AROM in degrees    Right Left Right Left Right Left   Hip Flexion (0-120 ) WNL WNL       Hip Abduction (0-45 ) WNL WNL       Hip External Rotation (0-50 ) WNL WNL       Hip Internal Rotation (0-40 ) WNL WNL       Hip Extension (0-15 ) WNL WNL         Hip/Knee Strength     Date:2020     Hip/Knee Strength (/5) MMT MMT MMT    Right Left  Right Left Right Left   Hip Flexion 3+ 3       Hip Abduction 3+ 3       Hip Adduction 5 5       Hip Extension         Hip External Rotation 4 3       Hip Internal Rotation 4        Knee Extension 4 4       Knee Flexion 4 4       Ankle Dorsiflexion  4 3+         Palpation:   Patient presents with:  -moderate soft tissue tenderness over the left, lateral hip and thigh regions.   -moderate tenderness tin the lumbar region left greater than right side.         Treatment Today   Exercise #1: Supine LTR bilaterally 1x5  Comment #1:  Supine hip bridges 1x10  Exercise #2: Seated hip marches 1x10  Comment #2: Seated hip abduction with yellow band 1x 10  Exercise #3: Long arc quad bilaterally 1x10      TREATMENT MINUTES COMMENTS   Evaluation 35 Low complexity eval   Pathology, POC, HEP, etc    Self-care/ Home management     Manual therapy     Neuromuscular Re-education     Therapeutic Activity     Therapeutic Exercises 25 See flowsheet or above   Gait training     Modality__________________                Total 60    Blank areas are intentional and mean the treatment did not include these items.       PT Evaluation Code: (Please list factors)  Patient History/Comorbidities:   Patient Active Problem List   Diagnosis     Leg pain, left     Femur fracture (H)     Hypertension     Pulmonary emphysema, unspecified emphysema type (H)     Hyperlipidemia     Bladder incontinence     Cardiomyopathy, idiopathic (H)     Depression with anxiety     Family history of blood disease     GERD (gastroesophageal reflux disease)     Post menopausal syndrome     OP (osteoporosis)     Osteoarthritis of hip     Insomnia     Dyspnea     Malignant neoplasm of central portion of left female breast (H)     Pleural nodules     Hot flashes due to tamoxifen     COPD exacerbation (H)     Herpes zoster without complication     Chest wall pain     Benign essential hypertension     Dyslipidemia     Acute respiratory failure with hypoxia (H)     Factor 5  Leiden mutation, heterozygous (H)     CKD (chronic kidney disease) stage 3, GFR 30-59 ml/min (H)     Centrilobular emphysema (H)     Anisocoria     Herniated intervertebral disc     Hot flashes, menopausal     OAB (overactive bladder)     Physical deconditioning     Pulsatile tinnitus of both ears     Chest pain     Acute hypoxemic respiratory failure (H)     S/P hip replacement, left     Hip pain, left     Examination: Included assessment of LE strength, ROM, lumbar mobility, and soft tissue palpation, as well as assessment of functional mobility.   Clinical Presentation: stable and uncomplicated   Clinical Decision Making: low     Patient History/  Comorbidities Examination  (body structures and functions, activity limitations, and/or participation restrictions) Clinical Presentation Clinical Decision Making (Complexity)   No documented Comorbidities or personal factors 1-2 Elements Stable and/or uncomplicated Low   1-2 documented comorbidities or personal factor 3 Elements Evolving clinical presentation with changing characteristics Moderate   3-4 documented comorbidities or personal factors 4 or more Unstable and unpredictable High            David Fortune, SPT  Audrey Espana  1/22/2020  10:37 AM  I attest that I was present in the room, making skilled assessments and directing the service provided today.  I was responsible for the assessment and treatment of the patient.  During this time, I was not engaged in treating another patient or doing other tasks.

## 2021-06-05 NOTE — PROGRESS NOTES
Dear Dr. Montanez,  Your patient, Irene León was seen today for management of osteoporosis.  The last bone density scan was done on 3/25/19:     1. The spine bone density L1-L2 with T-score -2.5 and significant artifacts.  2. Femoral bone densities show right total hip T- score -2.7.  3. Trabecular bone score indicates moderate trabecular bone architecture.  4. Right forearm bone density with T-score -1.9.     74 y.o. female with OSTEOPOROSIS and HIGH fracture risk, with history of oral bisphosphonate use for 15 years.    Patient was treated in the past with Fosamax for about 15 years. She stopped this medication 8 months ago.    Social history:  reports that she quit smoking about 12 years ago. She quit smokeless tobacco use about 15 years ago. She reports that she does not drink alcohol or use drugs.    Past medical history: CKD3, Breast cancer diagnosed in 2017 - had radiation and surgery, on Tamoxifen now for 2 years, COPD  Patient Active Problem List   Diagnosis     Leg pain, left     Femur fracture (H)     Hypertension     Pulmonary emphysema, unspecified emphysema type (H)     Hyperlipidemia     Bladder incontinence     Cardiomyopathy, idiopathic (H)     Depression with anxiety     Family history of blood disease     GERD (gastroesophageal reflux disease)     Post menopausal syndrome     OP (osteoporosis)     Osteoarthritis of hip     Insomnia     Dyspnea     Malignant neoplasm of central portion of left female breast (H)     Pleural nodules     Hot flashes due to tamoxifen     COPD exacerbation (H)     Herpes zoster without complication     Chest wall pain     Benign essential hypertension     Dyslipidemia     Acute respiratory failure with hypoxia (H)     Factor 5 Leiden mutation, heterozygous (H)     CKD (chronic kidney disease) stage 3, GFR 30-59 ml/min (H)     Centrilobular emphysema (H)     Anisocoria     Herniated intervertebral disc     Hot flashes, menopausal     OAB (overactive bladder)      Physical deconditioning     Pulsatile tinnitus of both ears     Chest pain     Acute hypoxemic respiratory failure (H)     S/P hip replacement, left     Hip pain, left     History of fractures: left hip fracture     FH: her mother had Osteoporosis  Family History   Problem Relation Age of Onset     Osteoporosis Other      Prostate cancer Brother      Breast cancer Maternal Aunt      Prostate cancer Maternal Uncle        Diet and supplements: 1000 mg Ca supplement + 2000 IU vit D    Risk medications: inhaled steroid with occasional use of oral sterid in the past    Gynecologic history: menopause early 50s, was on HRT for > 10 years    Laboratory testing:   Component      Latest Ref Rng & Units 1/15/2020   Sodium      136 - 145 mmol/L 142   Potassium      3.5 - 5.0 mmol/L 4.1   Chloride      98 - 107 mmol/L 107   CO2      22 - 31 mmol/L 24   Anion Gap, Calculation      5 - 18 mmol/L 11   Glucose      70 - 125 mg/dL 81   Calcium      8.5 - 10.5 mg/dL 9.3   BUN      8 - 28 mg/dL 13   Creatinine      0.60 - 1.10 mg/dL 1.24 (H)   GFR MDRD Af Amer      >60 mL/min/1.73m2 51 (L)   GFR MDRD Non Af Amer      >60 mL/min/1.73m2 42 (L)     Component      Latest Ref Rng & Units 10/15/2019   Calcium      8.5 - 10.5 mg/dL 9.3   PTH      10 - 86 pg/mL 71   Vitamin D, Total (25-Hydroxy)      30.0 - 80.0 ng/mL 47.6       ROS:     Constitutional: Negative.    HENT: Negative.    Eyes: Negative.    Respiratory: Negative.    Cardiovascular: Negative.    Gastrointestinal: Negative.    Endocrine: Negative.    Genitourinary: Negative.    Musculoskeletal: Negative.    Skin: Negative.    Allergic/Immunologic: Negative.    Neurological: Negative.    Hematological: Negative.    Psychiatric/Behavioral: Negative.      PE:  /82   Pulse 68   Wt 149 lb (67.6 kg)   LMP 11/02/1992   SpO2 94%   BMI 27.25 kg/m    Constitutional:  oriented to person, place, and time, appears well-nourished. No distress.   HENT:   Head: Normocephalic.   Eyes:  Conjunctivae are normal. Neck: Normal range of motion.   Pulmonary/Chest: Effort normal  Neurological: alert and oriented to person, place, and time.   Psychiatric: normal mood and affect.         Impression:   1. Osteoporosis, unspecified osteoporosis type, unspecified pathological fracture presence  DXA Bone Density Scan   2. CKD (chronic kidney disease) stage 3, GFR 30-59 ml/min (H)     3. Malignant neoplasm of central portion of left breast in female, estrogen receptor positive (H)         Plan:  1. The patient will take 1200 - 1500 mg of calcium daily from the diet and supplements.  2. The patient will take 2000 IU of vit D daily.  3. Treatment options discussed with the patient. She is not a candidate for anabolic treatment because of the previous radiation treatment for breast cancer. She was on BSP for more than 15 years. She has CKD3. We will start Prolia treatment.  4. I am asking for follow up in 1 year .    Patient Instructions   Prolia 1st today.  Prolia 2st in 6 months with my nurse. I will see you in 1 year.    DXA in 1 year .   Phone number to schedule [phone].    Daily calcium need is 8273-2266 mg a day from the diet and supplements.  Calcium citrate is easier to digest.  Vitamin D 2000 IU daily recommended.    25 minutes spent with the patient and more than 50 % of the time in counseling about osteoporosis and treatment options.    Thank you for the opportunity to participate in the care of your patient. If you have any additional questions, do not hesitate to contact me at [facility].    This note has been dictated using voice recognition software. Any grammatical or context distortions are unintentional and inherent to the software      With best personal regards,   Suzanna Shane MD, CCD  2/4/2020

## 2021-06-05 NOTE — PATIENT INSTRUCTIONS - HE
Refills of losartan sent into mail order pharmacy.  It is a 50 mg tablet.  Take 1 daily.    Blood pressure and other vital signs look good today.    I sent a prescription for triamcinolone steroid cream to your [facility] pharmacy.  Apply a small amount to your rashes twice daily.  I would not use this for longer than 7 to 10 days as it can thin your skin.    Send me a [employer] message or call the clinic if your rash persists.  We can place a referral to dermatology at that point.    Recheck kidney labs and electrolytes today.    Follow-up with Dr. Shane in February to discuss osteoporosis.    Follow-up with Dr. Pankaj Montanez in February for routine checkup.2

## 2021-06-05 NOTE — TELEPHONE ENCOUNTER
Refill Request  Did you contact pharmacy: Yes  Medication name: solifenacin ([facility]) 5 MG tablet 90 tablet 3 1/15/2020    Sig - Route: Take 1 tablet (5 mg total) by mouth at bedtime. - Oral   Sent to pharmacy as: solifenacin 5 mg tablet ([facility])   E-Prescribing Status: Receipt confirmed by pharmacy (1/15/2020 10:52 AM CST)     Requested Prescriptions      No prescriptions requested or ordered in this encounter     Who prescribed the medication: dr. Montanez  Requested Pharmacy: [facility]  Is patient out of medication: Yes  Patient notified refills processed in 3 business days:  yes  Okay to leave a detailed message: yes

## 2021-06-05 NOTE — PROGRESS NOTES
[facility] Daily Progress Note    Patient Name: Irene León  Date: 2020  Visit #:   Referring provider: Apolinar Mayo*  Visit Diagnosis:     ICD-10-CM    1. Chronic left hip pain M25.552     G89.29    2. Abnormality of gait R26.9    3. Chronic bilateral low back pain without sciatica M54.5     G89.29    4. Weakness of left hip R29.898    5. Muscle weakness (generalized) M62.81          Assessment:     Patient stated that feels much looser following therapy today and her pain is reduced.  Pt with much greater mobility and improved ambulation after PT today.      HEP/POC compliance is  good .  Patient demonstrates understanding/independence with home program.  Patient is benefitting from skilled physical therapy and is making steady progress toward functional goals.    Goal Status:  Pt. will demonstrate/verbalize independence in self-management of condition in : 6 weeks  Pt. will be independent with home exercise program in : 6 weeks  Pt. will be able to walk : 20 minutes;around the house;with less pain;for household mobility;in 6 weeks    Pt will: demonstrate an ability to perform a sit to stand from a chair with left hip pain less than 3/10 within 6 weeks so that she can tolerate getting up and down from a chair at home   Pt will: Patient will improve her sit to stand score to >=10 reps on the 30 second sit to  order to reduce her fall risk within 6 weeks.       Plan / Patient Education:     Progress with home program as tolerated.     Plan for next session: Progress with soft tissue massage and progress exercises for core and leg strengthening.     Subjective:     Pain Ratin/10 R quad and L hamstring       Pt reports that she is feeling sore today.  She is feeling quad tightness that she attributes to the exercises.     Pt reports that her low back is feeling some better as well as her L hip and leg.  However, recently she has been feeling some R quad pain as well as some L  "piriformis pain.      Pt reports that she is doing her exercises 2x per day.      Objective:     Patient with decreased overall mobility due to stiffness and pain.      Patient with significant LE and core weakness as demonstrated by movement and exercises.     Pt with increased tightness and tenderness over:  L lateral hip and gluteals = moderate+  L piriformis = moderate+  L ITB = moderate+    Treatment Today     Exercises: Exercises: (see flowsheet for updated exercises)  Exercise #1: Supine LTR bilaterally Bx10   Comment #1:  Supine hip bridges 1x10  Exercise #2: Seated hip marches - supine today Bx12   Comment #2: Seated hip abduction with yellow band - in supine today Bx15   Exercise #3: Long arc quad bilaterally   Comment #3: Nustep- occupied   Exercise #4: Standing hip extension - reviewed    Comment #4: Manual stretching   Exercise #5: H/S R, L x30\" in supine; piriformis R, L x30\" in supine; R, L quad x30\" in S/L     Manual therapy:  See below      TREATMENT MINUTES COMMENTS   Evaluation     Self-care/ Home management     Manual therapy 10 Very gentle STM and MFR to the L lateral hip, piriformis, and lateral thigh for muscle tightness and scar tissue mobility in R S/L    Neuromuscular Re-education     Therapeutic Activity     Therapeutic Exercises 20 See flowsheet   Gait training     Modality__________________                Total 30    Blank areas are intentional and mean the treatment did not include these items.       Audrey Espana, PT   2/6/2020  "

## 2021-06-06 NOTE — PROGRESS NOTES
[facility] Discharge Summary  Patient Name: Irene León  Date: 9/9/2020  Referral Diagnosis: Hip pain, left  Referring provider: Apolinar Mayo*  Visit Diagnosis:   1. Chronic left hip pain     2. Abnormality of gait         Goals:  No data recorded  No data recorded    Patient was seen for 9 visits physical therapy.    The patient attended therapy initially, but did not finish the therapy sessions prescribed.  Goals were not fully achieved. Explanation for goals not achieved: The patient discontinued therapy, did not return.    Therapy will be discontinued at this time.  Please see progress note dated 3/13/20 for patient status.      Thank you for your referral.  Emir Hendrickson, PT  9/9/2020  3:05 PM         [facility] Daily Progress Note    Patient Name: Irene León   Date: 03/13/20   Visit #: 9/12  Referring provider: Apolinar Mayo*  Visit Diagnosis:     ICD-10-CM    1. Chronic left hip pain  M25.552     G89.29    2. Abnormality of gait  R26.9          Assessment:     From eval:   Patient is a 74 year old female who presents to PT with low back pain and left hip pain. She has had this left hip pain for the last 4-5 years following surgeries of a RAY and repair of a greater trochanter and femur fracture. Patient presents to therapy with impairments in LE strength, soft tissue tenderness, pain in the left hip and lumbar region, and functional mobility limitations with decreased balance. Patient will benefit from skilled therapy in order to make improvements in these areas. Therapy initiated exercises today and patient was able to tolerate the exercises well.       Patient tolerated manual therapy  well, and had minimal pain during MT today. She reports decreased pain post treatment.    HEP/POC compliance is  good .  Patient demonstrates understanding/independence with home program.  Patient is benefitting from skilled physical therapy and is making steady progress  toward functional goals.    Goal Status:  Pt. will demonstrate/verbalize independence in self-management of condition in : 6 weeks  Pt. will be independent with home exercise program in : 6 weeks  Pt. will be able to walk : 20 minutes;around the house;with less pain;for household mobility;in 6 weeks    Pt will: demonstrate an ability to perform a sit to stand from a chair with left hip pain less than 3/10 within 6 weeks so that she can tolerate getting up and down from a chair at home   Pt will: Patient will improve her sit to stand score to >=10 reps on the 30 second sit to  order to reduce her fall risk within 6 weeks.       Plan / Patient Education:     Progress with home program as tolerated.     Plan for next session: Progress with soft tissue massage and progress exercises for core and leg strengthening.     Subjective:     Pain Ratin/10    Pt reports that she has been consistent with her HEP.  The exercises are going well.  She feels like she can't quite get the piriformis stretch right.  The rest are fine.    She is walking with a SEC at home, but not much.  She is using a WC for community mobility.     Objective:     Patient with decreased overall mobility due to stiffness and pain.  Patient in wheel chair today.    Patient with significant LE and core weakness as demonstrated by movement and exercises.     Pt with continued tightness and tenderness over:  L anterior thigh = moderate+  L ITB = moderate+  L piriformis = moderate+  R piriformis = moderate+     Treatment Today     Exercises: Exercises: (see flowsheet for updated exercises)  Exercise #1: Supine LTR bilaterally Bx10 after MT  Comment #1:  Supine hip bridges 1x10  Exercise #2: Seated hip marches - supine today Bx12 - modified to heel slide with ab set initiation 1x10 bilat.- not today  Comment #2: Seated hip abduction with yellow band - in supine today Bx12- not today  Exercise #3: Long arc quad bilaterally Bx10 with L ankle pump  "  Comment #3: Nustep  Exercise #4: Standing hip extension - not today  Comment #4: Manual stretching hip flexors, quads, hamstrings, piriformis 30\" x 2 bilateral  Exercise #5: Bridges 1x12       Manual therapy:  See below     TREATMENT MINUTES COMMENTS   Evaluation     Self-care/ Home management     Manual therapy 30 MFR layers 1-3 bilateral: glut max, piriformis, quad, hamstring, gastrocnemius   Neuromuscular Re-education     Therapeutic Activity     Therapeutic Exercises     Gait training     Modality__________________                Total 30    Blank areas are intentional and mean the treatment did not include these items.       Emir Hendrickson, PT   3/2/2020  "

## 2021-06-06 NOTE — PATIENT INSTRUCTIONS - HE
Re-check labs today.     Try to limit ibuprofen as much as you can, try to limit use to no more than one week from today.      Vital signs OK today.     Follow through with pulmonology to discuss xolair start.     Try gargling salt water or drinking warm fluids.     Follow up with Dr. Montanez in three months. Try gargling salt water or warm fluids.

## 2021-06-06 NOTE — PATIENT INSTRUCTIONS - HE
Continue on current medications at this time.    Continue the daily exercises and walking for your left hip pain.    Ibuprofen does have some risk for affecting kidney function and your heart failure, but can help reduce inflammation with your hip.  Try to avoid ibuprofen.    Follow-up in 4 to 6 weeks, we can do lab work next time.

## 2021-06-06 NOTE — PATIENT INSTRUCTIONS - HE
PLAN:    Stop Oxycodone      Tramadol 50 mg twice a day     Discussed supplements that may take place of Advil and be safer for stomach:        Inflammablox-- Orthomolecular labs, two capsules twice a day, may obtain at [facility] pharmacy or on-line       CMO (Cetyly Myristoleate) --- [place] is one brand, 500 mg two capsules three times a day for four weeks, two capsules twice a day for two weeks, then one twice a day, may obtain on-line or at [employer],      may take both of these together if needed    Return in 8 weeks

## 2021-06-06 NOTE — TELEPHONE ENCOUNTER
Medication Request  Medication name:    Disp Refills Start End    simvastatin (ZOCOR) 40 MG tablet        Sig - Route: Take 40 mg by mouth bedtime. - Oral    Class: Historical Med      Requested Pharmacy: [facility]  Reason for request: New prescription  When did you use medication last?:  Last night  Patient offered appointment:  n/a  Okay to leave a detailed message: yes  [phone]    FYI: This medication is listed as an historical medication on the patient's current medication list. Patient stated that she only have 2 pills left.

## 2021-06-06 NOTE — PROGRESS NOTES
[facility] Daily Progress Note    Patient Name: Irene León   Date: 2/24/2020  Visit #: 8/12  Referring provider: Apolinar Mayo*  Visit Diagnosis:     ICD-10-CM    1. Chronic left hip pain M25.552     G89.29    2. Abnormality of gait R26.9    3. Chronic bilateral low back pain without sciatica M54.5     G89.29    4. Weakness of left hip R29.898    5. Muscle weakness (generalized) M62.81          Assessment:     From eval:   Patient is a 74 year old female who presents to PT with low back pain and left hip pain. She has had this left hip pain for the last 4-5 years following surgeries of a RAY and repair of a greater trochanter and femur fracture. Patient presents to therapy with impairments in LE strength, soft tissue tenderness, pain in the left hip and lumbar region, and functional mobility limitations with decreased balance. Patient will benefit from skilled therapy in order to make improvements in these areas. Therapy initiated exercises today and patient was able to tolerate the exercises well.     Patient tolerated manual therapy and exercises well, but had moderate pain today. She states now that her anterior thigh hurts when she bends her knees up or extends her back. Patient was educated about the benefits of movement for our joints and that walking is a good thing in moderation. Patient is pleased with her results in physical therapy up to this point, but fears when she stops physical therapy she will get very painful again.   Patient continues to state that she is very tender to palpation and that even the slightest movements with pain cause her to have fear about the disease progression in her hips.     HEP/POC compliance is  good .  Patient demonstrates understanding/independence with home program.  Patient is benefitting from skilled physical therapy and is making steady progress toward functional goals.    Goal Status:  Pt. will demonstrate/verbalize independence in  "self-management of condition in : 6 weeks  Pt. will be independent with home exercise program in : 6 weeks  Pt. will be able to walk : 20 minutes;around the house;with less pain;for household mobility;in 6 weeks    Pt will: demonstrate an ability to perform a sit to stand from a chair with left hip pain less than 3/10 within 6 weeks so that she can tolerate getting up and down from a chair at home   Pt will: Patient will improve her sit to stand score to >=10 reps on the 30 second sit to  order to reduce her fall risk within 6 weeks.       Plan / Patient Education:     Progress with home program as tolerated.     Plan for next session: Progress with soft tissue massage and progress exercises for core and leg strengthening.     Subjective:     Pain down both anterior thighs and both gluts today.    Pain Ratin/10       Objective:     Patient with decreased overall mobility due to stiffness and pain.      Patient with significant LE and core weakness as demonstrated by movement and exercises.     Pt with increased tightness and tenderness over:  L anterior thigh = moderate+  L ITB = moderate+    Treatment Today     Exercises: Exercises: (see flowsheet for updated exercises)  Exercise #1: Supine LTR bilaterally Bx10   Comment #1:  Supine hip bridges 1x10  Exercise #2: Seated hip marches - supine today Bx12 - modified to heel slide with ab set initiation 1x10 bilat.- not today  Comment #2: Seated hip abduction with yellow band - in supine today Bx12- not today  Exercise #3: Long arc quad bilaterally 1x15  Comment #3: Nustep- 4 mins - at level 4   Exercise #4: Standing hip extension - not today  Comment #4: Manual stretching hip flexors, quads, hamstrings, piriformis 30\" x 2 bilateral  Exercise #5: Bridges 1x10 with cues breath    Note: Today 2020 PT only stretched patient's bilateral piriformis and hamstrings, did not stretch quads or hip flexors. These areas were addressed in manual therapy " "MFR.    Manual therapy:  MFR layers 1-3 bilateral: left anterior thigh and TFL/ITB patient supine(hooklying)    TREATMENT MINUTES COMMENTS   Evaluation     Self-care/ Home management     Manual therapy 25 See above   Neuromuscular Re-education     Therapeutic Activity     Therapeutic Exercises 3 Manual piriformis stretch 30\" x 3 bilateral   Gait training     Modality__________________                Total 28    Blank areas are intentional and mean the treatment did not include these items.       Emir Hendrickson, PT   2/24/2020  "

## 2021-06-06 NOTE — PROGRESS NOTES
[facility] Daily Progress Note    Patient Name: Irene León   Date: 2/13/2020  Visit #: 6/12  Referring provider: Apolinar Mayo*  Visit Diagnosis:     ICD-10-CM    1. Chronic left hip pain M25.552     G89.29    2. Abnormality of gait R26.9    3. Chronic bilateral low back pain without sciatica M54.5     G89.29    4. Weakness of left hip R29.898    5. Muscle weakness (generalized) M62.81          Assessment:     From eval:   Patient is a 74 year old female who presents to PT with low back pain and left hip pain. She has had this left hip pain for the last 4-5 years following surgeries of a RAY and repair of a greater trochanter and femur fracture. Patient presents to therapy with impairments in LE strength, soft tissue tenderness, pain in the left hip and lumbar region, and functional mobility limitations with decreased balance. Patient will benefit from skilled therapy in order to make improvements in these areas. Therapy initiated exercises today and patient was able to tolerate the exercises well.     Patient stated that feels slightly sore after therapy, but she was ambulating with greater ease following manual therapy today. Patient was able to tolerate manual therapy and the exercises well today and was talkative. She was arching her back during the hip marches in supine, so therapy modified this to a heel slide with ab set to initiate.      HEP/POC compliance is  good .  Patient demonstrates understanding/independence with home program.  Patient is benefitting from skilled physical therapy and is making steady progress toward functional goals.    Goal Status:  Pt. will demonstrate/verbalize independence in self-management of condition in : 6 weeks  Pt. will be independent with home exercise program in : 6 weeks  Pt. will be able to walk : 20 minutes;around the house;with less pain;for household mobility;in 6 weeks    Pt will: demonstrate an ability to perform a sit to stand from a chair  "with left hip pain less than 3/10 within 6 weeks so that she can tolerate getting up and down from a chair at home   Pt will: Patient will improve her sit to stand score to >=10 reps on the 30 second sit to  order to reduce her fall risk within 6 weeks.       Plan / Patient Education:     Progress with home program as tolerated.     Plan for next session: Progress with soft tissue massage and progress exercises for core and leg strengthening.     Subjective:     Both legs hurting today. Walking is very difficult.  Pt reports that she is doing her exercises 2x per day.    Pain Ratin/10 R and L quad and hamstring        Objective:     Patient with decreased overall mobility due to stiffness and pain.      Patient with significant LE and core weakness as demonstrated by movement and exercises.     Pt with increased tightness and tenderness over:  L lateral hip and gluteals = moderate+  L piriformis = moderate+  L ITB = moderate+    Treatment Today     Exercises: Exercises: (see flowsheet for updated exercises)  Exercise #1: Supine LTR bilaterally Bx10   Comment #1:  Supine hip bridges 1x10  Exercise #2: Seated hip marches - supine today Bx12 - modified to heel slide with ab set initiation 1x10 bilat.  Comment #2: Seated hip abduction with yellow band - in supine today Bx12  Exercise #3: Long arc quad bilaterally not today  Comment #3: Nustep- 4 mins - at level 4   Exercise #4: Standing hip extension - not today  Comment #4: Manual stretching   Exercise #5: H/S R, L x30\" in supine; piriformis R, L x30\" in supine; R, L quad x30\" in S/L   - stretches not today    Manual therapy:  MFR layers 1-3 bilateral: TFL, quad, hamstring     TREATMENT MINUTES COMMENTS   Evaluation     Self-care/ Home management     Manual therapy 30    Neuromuscular Re-education     Therapeutic Activity     Therapeutic Exercises     Gait training     Modality__________________                Total 30    Blank areas are intentional and mean " the treatment did not include these items.       Emir Hendrickson, PT   2/13/2020

## 2021-06-06 NOTE — TELEPHONE ENCOUNTER
Pill indentified as matching description on bottle or [email or web address]:    Last dose taken:   start date/ date:  Quantity ordered:  Expected count:  Actual count: 4  consistent/inconsistent:  :    Returned/Destroyed: Destroyed    UDT obtained: 1-2020

## 2021-06-06 NOTE — PROGRESS NOTES
"Assessment/Plan:     Problem List Items Addressed This Visit     Leg pain, left    Relevant Medications    traMADoL (ULTRAM) 50 mg tablet    Osteoarthritis of hip    Relevant Medications    triamcinolone (KENALOG) 0.5 % ointment      Other Visit Diagnoses     Chronic pain syndrome    -  Primary            No follow-ups on file.    Patient Instructions   PLAN:    Stop Oxycodone      Tramadol 50 mg twice a day     Discussed supplements that may take place of Advil and be safer for stomach:        Inflammablox-- Orthomolecular labs, two capsules twice a day, may obtain at [facility] pharmacy or on-line       CMO (Cetyly Myristoleate) --- [place] is one brand, 500 mg two capsules three times a day for four weeks, two capsules twice a day for two weeks, then one twice a day, may obtain on-line or at [employer],      may take both of these together if needed    Return in 8 weeks        Subjective:       75 y.o. female presents for  osteoarthritis of the hip.    Reviewing the record she has been going to physical therapy.  She has told them her scar pain in her hip pain is doing better.  She had told him about a fall with her left foot twisting it.    On interview today she brings in her oxycodone with 4 bottles remaining from the prescription of 1/13 #56.  She feels it does not help.    She describes with her left foot she tripped over carpeting.  She fell down.  She been back her left great toe.  She has been putting steroid cream on.  She thinks is just a bruise and does not need x-ray and will get better.    Describes the physical therapy \"for all my ill months\" has been helping.    Reviews the tramadol may have done better when she was taking Advil.  By itself it was not particularly helpful.  She knows she needs to avoid anti-inflammatories due to GI problems.    She had tried some curcumin without benefit.      Current Outpatient Medications:      acetaminophen (TYLENOL) 500 MG tablet, Take " 1-2 tablets (500-1,000 mg total) by mouth every 6 (six) hours as needed., Disp: , Rfl: 0     aspirin 81 MG EC tablet, Take 81 mg by mouth daily., Disp: , Rfl:      azelastine (ASTELIN) 137 mcg (0.1 %) nasal spray, 2 sprays into each nostril daily., Disp: , Rfl:      azithromycin (ZITHROMAX) 250 MG tablet, Take 250 mg by mouth daily., Disp: , Rfl:      benzonatate (TESSALON) 200 MG capsule, Take 1 capsule (200 mg total) by mouth 3 (three) times a day., Disp: 60 capsule, Rfl: 2     chlorhexidine (PERIDEX) 0.12 % solution, Apply 15 mL to the mouth or throat at bedtime.    , Disp: , Rfl:      cholecalciferol, vitamin D3, 1,000 unit tablet, Take 1,000 Units by mouth daily., Disp: , Rfl:      cyanocobalamin, vitamin B-12, (VITAMIN B-12 ORAL), Take 1,000 mcg by mouth daily.    , Disp: , Rfl:      EPINEPHrine (EPIPEN/ADRENACLICK/AUVI-Q) 0.3 mg/0.3 mL injection, Inject 0.3 mg into the shoulder, thigh, or buttocks., Disp: , Rfl:      famotidine (PEPCID) 20 MG tablet, Take 1 tablet by mouth 2 (two) times a day., Disp: , Rfl:      fluticasone (FLONASE) 50 mcg/actuation nasal spray, Apply 1 spray into each nostril as needed. , Disp: , Rfl:      fluticasone-salmeterol (ADVAIR) 500-50 mcg/dose DISKUS, Inhale 1 puff 2 (two) times a day., Disp: , Rfl:      furosemide (LASIX) 20 MG tablet, Take 10 mg by mouth daily.    , Disp: , Rfl:      gabapentin (NEURONTIN) 300 MG capsule, Take 1 capsule (300 mg total) by mouth 2 (two) times a day. (Patient taking differently: Take 600 mg by mouth 2 (two) times a day. ), Disp: 180 capsule, Rfl: 1     ipratropium (ATROVENT HFA) 17 mcg/actuation inhaler, Inhale 2 puffs every 6 (six) hours., Disp: 3 Inhaler, Rfl: 3     ipratropium-albuterol (DUO-NEB) 0.5-2.5 mg/3 mL nebulizer, 1 neb 4 times a day until seen by primary and then as needed or as directed, Disp: 60 vial, Rfl: 1     lidocaine (LIDODERM) 5 %, Place 1 patch on the skin daily. To left hip (Patient taking differently: Place 1 patch on the  skin daily as needed. To left hip), Disp: 30 patch, Rfl: 3     losartan (COZAAR) 50 MG tablet, Take 1 tablet (50 mg total) by mouth daily., Disp: 90 tablet, Rfl: 0     metoprolol succinate (TOPROL-XL) 50 MG 24 hr tablet, Take 1.5 tablets (75 mg total) by mouth daily., Disp: 135 tablet, Rfl: 3     mirabegron (MYRBETRIQ) 50 mg Tb24 ER tablet, Take 1 tablet (50 mg total) by mouth daily., Disp: 90 tablet, Rfl: 3     NON FORMULARY, Take 1 Tablet 24 Hour Sustained Release by mouth daily. Airborne, 1 tablet orally daily, Disp: , Rfl:      pantoprazole (PROTONIX) 40 MG tablet, Take 1 tablet (40 mg total) by mouth daily., Disp: 90 tablet, Rfl: 2     PROVENTIL HFA 90 mcg/actuation inhaler, Inhale 1 puff every 6 (six) hours as needed. , Disp: , Rfl:      simvastatin (ZOCOR) 40 MG tablet, Take 1 tablet (40 mg total) by mouth at bedtime., Disp: 90 tablet, Rfl: 3     solifenacin (VESICARE) 5 MG tablet, Take 1 tablet (5 mg total) by mouth at bedtime., Disp: 90 tablet, Rfl: 3     tamoxifen (NOLVADEX) 20 MG tablet, TAKE 1 TABLET DAILY, Disp: 90 tablet, Rfl: 4     traZODone (DESYREL) 50 MG tablet, Take 1 tablet (50 mg total) by mouth at bedtime as needed., Disp: 90 tablet, Rfl: 1     triamcinolone (KENALOG) 0.1 % cream, Apply to rash on arms twice daily for one week, Disp: 30 g, Rfl: 0     venlafaxine (EFFEXOR-XR) 150 MG 24 hr capsule, TAKE 1 CAPSULE DAILY, Disp: 90 capsule, Rfl: 4     ascorbic acid, vitamin C, (VITAMIN C) 1000 MG tablet, Take 1,000 mg by mouth daily. Airborne 1 tab daily, Disp: , Rfl:      sodium chloride (OCEAN) 0.65 % nasal spray, 2 sprays into each nostril as needed., Disp: , Rfl:      traMADoL (ULTRAM) 50 mg tablet, Take 1 tablet (50 mg total) by mouth 2 (two) times a day., Disp: 60 tablet, Rfl: 1     triamcinolone (KENALOG) 0.5 % ointment, Apply o foot twice a day, Disp: 30 g, Rfl: 1    Current Facility-Administered Medications:      denosumab 60 mg (PROLIA 60 mg/ml), 60 mg, Subcutaneous, Q6 Months, Visekruna,  "MD Suzanna, 60 mg at 02/04/20 1352      She is alert with a clear sensorium good eye contact.  Thought process tight logical.  She ambulates with a cane.    On exam her left foot does show some venous stasis changes, the left great toe does have some bruising around the DIP.  She indicates it is also tender in the forefoot.  Again as noted she declines x-rays noted she has been able to walk on it.             Objective:     Vitals:    03/09/20 1058   BP: 144/75   Pulse: 66   Weight: 151 lb (68.5 kg)   Height: 5' 2\" (1.575 m)   PainSc:   7   PainLoc: Back   LMP: 11/02/1992     Plan: We will discontinue the oxycodone and resume tramadol 50 mg twice a day.    Discussed supplements as alternatives to the nonsteroidals.  This included the use of cetyl Merris told late, and other herbal and supplement approaches that include q. [place] with other agents.  She may take 1 or both of them.    Time spent more than 15 minutes face face, 50% counseling about above condition coordination treatment plan          This note has been dictated using voice recognition software. Any grammatical or context distortions are unintentional and inherent to the software  "

## 2021-06-06 NOTE — PROGRESS NOTES
[facility] clinic Follow Up Note    Irene León   75 y.o. female    Date of Visit: 2020    Chief Complaint   Patient presents with     Follow-up     Subjective  Stephanie is here for follow-up of multiple medical issues.  She was mainly focusing on her chronic left hip pain, as well as her chronic severe COPD.    Patient had a redo of a left hip replacement with bone graft and extensive scarring in her left hip in .  She has had chronic pain and stiffness since.    She has a broken heart syndrome congestive heart failure condition after her father  6 years ago.  2019 heart echo reviewed by me today shows ejection fraction 39% with diastolic dysfunction but no heart valve issue.  2019 BNP 1019.    She has borderline elevated creatinine of 1.2 in November and was again 1.2 in January.    Patient did have a lower blood pressure in November and I had her lower the losartan down to 25 mg a day.  In January her blood pressure was high at 150/70 I had her go back to 50 mg a day losartan.    Today we had some difficulty checking her blood pressure, she has a very weak, quiet pulse and blood pressure, likely reflective of her COPD and congestive heart failure condition.    She denies any lightheaded dizzy spells or increased weakness.  She has been eating normally, no diarrhea and no blood in stool or bleeding issues.    No fever or new infectious symptoms.    She denies any worsening shortness of breath beyond her baseline severe dyspnea on exertion.  She is not using home oxygen.    She did see her pulmonologist 2020 and I did review that note today.  She is now on azithromycin 250 mg every day instead of every other day.  She is using Flonase and saline irrigation now which is new.  She is had some chronic sinusitis and drainage.    Pulmonary medicine is looking into starting Xolair but has not started yet.    Patient quit smoking in .    Last chest imaging was February  2019, reviewed by me today showing hyperinflation consistent with COPD.  Some atelectasis and scarring.    Patient has not had any increasing lower extremity edema and weight is stable, down a pound and a half.    She still on Lasix 10 mg a day.    Patient does admit that she is taking ibuprofen 200 mg in the evening to help her sleep with her hip pain and she does state that helps quite a bit.  She states Tylenol does not help her.    She did see the pain clinic last month and I did review that note.  She was given oxycodone up to 5 mg twice a day but she is only using 5 mg in the morning, she does not believe it is very effective.    She is now using gabapentin 300 mg in the evening in addition to her trazodone.  Also on Effexor for chronic dysthymia.    Bowels are still regular.  No new abdominal pain complaints.    She is taking Protonix with her Pepcid now.  No reflux symptoms.    She does have a history of left bundle branch block and ectopy.  She denies any increasing palpitations.  She denies history of sleep apnea.    She did have a stage Ia invasive ductal cancer of the right breast with lumpectomy and radiation in 2017.  May 2019 mammogram negative.  Saw oncology last month with negative evaluation.  Planned follow-up CT scan in June.    She does have osteoporosis and started on Prolia first dose on February 4.  No side effect complaints with that.  Previous Fosamax was over 8 years ago.  I did review the DEXA scan from March 2019 with a spine score of -2.5 and femur score -2.7 with moderate trabecular bone.  Previous sacral fracture in 2012.    She is heterozygous for factor V Leiden mutation but no history of DVT.  She did have a history of occipital stroke and does have carotid and cerebral artery vascular disease.  She is on aspirin and simvastatin.  Normal liver test last June.    She denies palpitations or rapid heart rate spells.    No headache complaints.  No falls.    She is not checked her blood  pressure on her own.    PMHx:    Past Medical History:   Diagnosis Date     Arrhythmia      Breast cancer (H) 2017     CHF (congestive heart failure) (H)      COPD (chronic obstructive pulmonary disease) (H)      Coronary artery disease      GERD (gastroesophageal reflux disease)      Hyperlipidemia      Hypertension      Idiopathic cardiomyopathy (H)      PSHx:    Past Surgical History:   Procedure Laterality Date     BREAST BIOPSY Right 2017     BREAST LUMPECTOMY Left 06/2017    x2     CARDIAC CATHETERIZATION       CATARACT EXTRACTION Left 07/18/2017     PA OPEN TX FEMORAL FRACTURE DISTAL MED/LAT CONDYLE Left 10/28/2015    Procedure: OPEN REDUCTION INTERNAL FIXATION LEFT  PROXIMAL FEMUR PERIPROSTHETIC FRACTURE;  Surgeon: Yovanny Albarran MD;  Location: [facility];  Service: Orthopedics     REVISION TOTAL HIP ARTHROPLASTY Left      Immunizations:   Immunization History   Administered Date(s) Administered     Influenza [id number], 01/05/2010     Influenza high dose,seasonal,PF, 65+ yrs 10/30/2014, 10/01/2015, 11/26/2016     Influenza, Seasonal, Inj PF IIV3 11/03/2003     Influenza, inj, historic,unspecified 10/01/2016, 10/01/2018     Influenza,D5V7-03,Pandemic,split,IM 01/05/2010     Influenza,seasonal, Inj IIV3 11/03/2003, 11/08/2006, 10/24/2007, 11/28/2008, 10/09/2009, 10/22/2010, 01/26/2012, 10/03/2012, 11/01/2013     Influenza,trivalent,im, 65+yrs 09/20/2017, 09/25/2018, 10/10/2019     Pneumo Conj 13-V (2010&after) 05/11/2015     Pneumo Polysac 23-V 11/24/1997, 11/21/2007, 05/17/2017     Td, adult adsorbed, PF 07/20/2004     Tdap 02/12/2016     ZOSTER, LIVE 02/18/2009, 10/20/2014       ROS A comprehensive review of systems was performed and was otherwise negative    Medications, allergies, and problem list were reviewed and updated    Exam  /70   Pulse (!) 59   Temp 97.6  F (36.4  C) (Oral)   Wt 147 lb 8 oz (66.9 kg)   LMP 11/02/1992   SpO2 92%   BMI 26.98 kg/m    Difficult to auscultate blood  pressure, this has been an issue in the past.  I was able to get a standing blood pressure on her left arm that was 120/70, but somewhat faint.  She clinically does not have any orthostasis and her color and mentation is good.    Decreased breath sounds throughout with some reduced respiratory excursion but not severe.  No wheezing or crackles or dullness.  She has some mild to moderate postnasal drip type pharyngitis.  No exudate.  No JVD.  Heart is regular without murmur gallop or ectopy today.  Abdomen is nontender just mildly overweight and no ankle edema.  Able to climb up on the exam table.    Assessment/Plan  1. Pulmonary emphysema, unspecified emphysema type (H)  Severe but appears stable.  Not on oxygen at home yet but has had borderline hypoxia previously.    Continue with daily azithromycin prophylaxis and current inhalers.    Pulmonary medicine is looking into getting Xolair covered.    Follow-up in April with pulmonary medicine.    Follow-up as needed for exacerbation.  I did discuss possibility to use short-term steroids with COPD exacerbation, but also risk of steroids including her osteoporosis especially with previous fracture.    If worsening hypoxia, evaluate ambulatory O2 to see if she qualifies for home oxygen.    Still full code    2. Cardiomyopathy, idiopathic (H)  Broken heart syndrome.  Patient's blood pressure has been fluctuating, appears okay today and denies orthostasis.    Clinically has been stable on low-dose furosemide.  Continue on the 50 mg dose of losartan.    She was evaluated last month and blood pressure 124/80 and creatinine 1.2 with a potassium of 4.1.  She declined repeat lab check today but will follow-up in 4 to 6 weeks and plan labs at that time.    She denies sleep apnea and does not want a sleep evaluation.    3. Osteoporosis, unspecified osteoporosis type, unspecified pathological fracture presence  Prolia first dose started earlier this month.    4. S/P hip  replacement, left  Severe scar tissue and chronic pain with her left hip.  Uses Lidoderm.  She has made some progress with physical therapy and deep tissue massage and fascial mobilization.  I stressed the importance of daily movement and activity and stretching.  Avoid prolonged sitting which she had been doing previously.    She is now seeing the pain clinic and has been given a prescription for oxycodone.  She is currently limiting that to the just the morning.  She will need to follow-up with the pain clinic for further narcotics if she desires that.    Goal to wean off narcotics with mobilization and stretching over time.    Using gabapentin 300 mg at night.  I did discuss option for Tylenol at night but she feels that is ineffective.    She is currently using 1 200 mg ibuprofen in the evening, I did warn her there is risk of affecting kidney function and her heart failure with that, but she feels the benefit outweighs the risk for her.  I encouraged her to use that intermittently at most.  She does states she was using that same ibuprofen last month when she had her kidney function checked and was stable at that time.    5. History of stroke  She does have vascular disease, history of smoking.  Aspirin and simvastatin medication prevention plan.    6. History of invasive breast cancer  No evidence of recurrence at this time, post lumpectomy and radiation in 2017.  Yearly mammogram in May, oncology follow-up with CT scan planned for June.    History of chronic dysthymia and anxiety on Effexor and trazodone.    History of overactive bladder.    Return in about 5 weeks (around 3/20/2020) for Recheck.   Patient Instructions   Continue on current medications at this time.    Continue the daily exercises and walking for your left hip pain.    Ibuprofen does have some risk for affecting kidney function and your heart failure, but can help reduce inflammation with your hip.  Try to avoid ibuprofen.    Follow-up in 4  to 6 weeks, we can do lab work next time.        Pankaj Montanez MD        Current Outpatient Medications   Medication Sig Dispense Refill     acetaminophen (TYLENOL) 500 MG tablet Take 1-2 tablets (500-1,000 mg total) by mouth every 6 (six) hours as needed.  0     ascorbic acid, vitamin C, (VITAMIN C) 1000 MG tablet Take 1,000 mg by mouth daily.       aspirin 81 MG EC tablet Take 81 mg by mouth daily.       azelastine (ASTELIN) 137 mcg (0.1 %) nasal spray 2 sprays into each nostril daily.       benzonatate (TESSALON) 200 MG capsule Take 1 capsule (200 mg total) by mouth 3 (three) times a day. 60 capsule 2     chlorhexidine (PERIDEX) 0.12 % solution Apply 15 mL to the mouth or throat at bedtime.              cholecalciferol, vitamin D3, 1,000 unit tablet Take 1,000 Units by mouth daily.       cyanocobalamin, vitamin B-12, (VITAMIN B-12 ORAL) Take 1,000 mcg by mouth daily.              famotidine (PEPCID) 20 MG tablet Take 1 tablet by mouth 2 (two) times a day.       fluticasone (FLONASE) 50 mcg/actuation nasal spray Apply 1 spray into each nostril as needed.        fluticasone-salmeterol (ADVAIR) 500-50 mcg/dose DISKUS Inhale 1 puff 2 (two) times a day.       furosemide (LASIX) 20 MG tablet Take 10 mg by mouth daily.              gabapentin (NEURONTIN) 300 MG capsule Take 1 capsule (300 mg total) by mouth 2 (two) times a day. (Patient taking differently: Take 600 mg by mouth 2 (two) times a day. ) 180 capsule 1     ipratropium (ATROVENT HFA) 17 mcg/actuation inhaler Inhale 2 puffs every 6 (six) hours. 3 Inhaler 3     ipratropium-albuterol (DUO-NEB) 0.5-2.5 mg/3 mL nebulizer 1 neb 4 times a day until seen by primary and then as needed or as directed 60 vial 1     lidocaine (LIDODERM) 5 % Place 1 patch on the skin daily. To left hip (Patient taking differently: Place 1 patch on the skin daily as needed. To left hip) 30 patch 3     losartan (COZAAR) 50 MG tablet Take 1 tablet (50 mg total) by mouth daily. 90 tablet 0      metoprolol succinate (TOPROL-XL) 50 MG 24 hr tablet Take 1.5 tablets (75 mg total) by mouth daily. 135 tablet 3     mirabegron (MYRBETRIQ) 50 mg Tb24 ER tablet Take 1 tablet (50 mg total) by mouth daily. 90 tablet 3     oxyCODONE (ROXICODONE) 5 MG immediate release tablet Take 1 tablet (5 mg total) by mouth 2 (two) times a day. 56 tablet 0     pantoprazole (PROTONIX) 40 MG tablet Take 1 tablet (40 mg total) by mouth daily. 90 tablet 2     PROVENTIL HFA 90 mcg/actuation inhaler Inhale 1 puff every 6 (six) hours as needed.        simvastatin (ZOCOR) 40 MG tablet Take 40 mg by mouth bedtime.       sodium chloride (OCEAN) 0.65 % nasal spray 2 sprays into each nostril as needed.       solifenacin (VESICARE) 5 MG tablet Take 1 tablet (5 mg total) by mouth at bedtime. 90 tablet 3     tamoxifen (NOLVADEX) 20 MG tablet TAKE 1 TABLET DAILY 90 tablet 4     traZODone (DESYREL) 50 MG tablet Take 1 tablet (50 mg total) by mouth at bedtime as needed. 90 tablet 1     triamcinolone (KENALOG) 0.1 % cream Apply to rash on arms twice daily for one week 30 g 0     venlafaxine (EFFEXOR-XR) 150 MG 24 hr capsule TAKE 1 CAPSULE DAILY 90 capsule 4     azithromycin (ZITHROMAX) 250 MG tablet Take 250 mg by mouth daily.       Current Facility-Administered Medications   Medication Dose Route Frequency Provider Last Rate Last Dose     denosumab 60 mg (PROLIA 60 mg/ml)  60 mg Subcutaneous Q6 Months Suzanna Shane MD   60 mg at 20 1352     Allergies   Allergen Reactions     Sulfa (Sulfonamide Antibiotics) Rash     Headaches and dizziness.     Homeopathic Drugs Unknown and Other (See Comments)     Comment: runny nose, Comment: runny nose     Mold Extracts      Morphine (Pf)      hallucinate     Lisinopril Cough, Unknown and Itching     Comment: cough, Comment: cough     Sulfacetamide Sodium Rash     Social History     Tobacco Use     Smoking status: Former Smoker     Last attempt to quit: 10/28/2007     Years since quittin.3      Smokeless tobacco: Former User     Quit date: 9/6/2004   Substance Use Topics     Alcohol use: No     Drug use: No

## 2021-06-06 NOTE — PROGRESS NOTES
[facility] Daily Progress Note    Patient Name: Irene León   Date: 3/2/2020  Visit #: 8/12  Referring provider: Apolinar Mayo*  Visit Diagnosis:     ICD-10-CM    1. Chronic left hip pain M25.552     G89.29    2. Abnormality of gait R26.9    3. Chronic bilateral low back pain without sciatica M54.5     G89.29    4. Weakness of left hip R29.898    5. Muscle weakness (generalized) M62.81          Assessment:     From eval:   Patient is a 74 year old female who presents to PT with low back pain and left hip pain. She has had this left hip pain for the last 4-5 years following surgeries of a RAY and repair of a greater trochanter and femur fracture. Patient presents to therapy with impairments in LE strength, soft tissue tenderness, pain in the left hip and lumbar region, and functional mobility limitations with decreased balance. Patient will benefit from skilled therapy in order to make improvements in these areas. Therapy initiated exercises today and patient was able to tolerate the exercises well.     Patient tolerated manual therapy  well, but had moderate pain today. She reports decreased pain post treatment.    HEP/POC compliance is  good .  Patient demonstrates understanding/independence with home program.  Patient is benefitting from skilled physical therapy and is making steady progress toward functional goals.    Goal Status:  Pt. will demonstrate/verbalize independence in self-management of condition in : 6 weeks  Pt. will be independent with home exercise program in : 6 weeks  Pt. will be able to walk : 20 minutes;around the house;with less pain;for household mobility;in 6 weeks    Pt will: demonstrate an ability to perform a sit to stand from a chair with left hip pain less than 3/10 within 6 weeks so that she can tolerate getting up and down from a chair at home   Pt will: Patient will improve her sit to stand score to >=10 reps on the 30 second sit to  order to reduce her  "fall risk within 6 weeks.       Plan / Patient Education:     Progress with home program as tolerated.     Plan for next session: Progress with soft tissue massage and progress exercises for core and leg strengthening.     Subjective:     Increased pain today.  Patient fell and landed on right side.  She reports that she did not hit her head or have any LOC.    Pain Ratin/10       Objective:     Patient with decreased overall mobility due to stiffness and pain.  Patient in wheel chair today.    Patient with significant LE and core weakness as demonstrated by movement and exercises.     Pt with increased tightness and tenderness over:  L anterior thigh = moderate+  L ITB = moderate+    Treatment Today     Exercises: Exercises: (see flowsheet for updated exercises)  Exercise #1: Supine LTR bilaterally Bx10   Comment #1:  Supine hip bridges 1x10  Exercise #2: Seated hip marches - supine today Bx12 - modified to heel slide with ab set initiation 1x10 bilat.- not today  Comment #2: Seated hip abduction with yellow band - in supine today Bx12- not today  Exercise #3: Long arc quad bilaterally 1x15  Comment #3: Nustep- 4 mins - at level 4   Exercise #4: Standing hip extension - not today  Comment #4: Manual stretching hip flexors, quads, hamstrings, piriformis 30\" x 2 bilateral  Exercise #5: Bridges 1x10 with cues breath      Manual therapy:  MFR layers 1-3 bilateral: glut max, piriformis, quad, hamstring    TREATMENT MINUTES COMMENTS   Evaluation     Self-care/ Home management     Manual therapy 25 See above   Neuromuscular Re-education     Therapeutic Activity     Therapeutic Exercises     Gait training     Modality__________________                Total 285    Blank areas are intentional and mean the treatment did not include these items.       Emir Hendrickson, PT   3/2/2020  "

## 2021-06-06 NOTE — PROGRESS NOTES
[facility] Daily Progress Note    Patient Name: Irene León   Date: 2/10/2020  Visit #: 5/12  Referring provider: Apolinar Mayo*  Visit Diagnosis:     ICD-10-CM    1. Chronic left hip pain M25.552     G89.29    2. Abnormality of gait R26.9    3. Chronic bilateral low back pain without sciatica M54.5     G89.29    4. Weakness of left hip R29.898    5. Muscle weakness (generalized) M62.81          Assessment:     From eval:   Patient is a 74 year old female who presents to PT with low back pain and left hip pain. She has had this left hip pain for the last 4-5 years following surgeries of a RAY and repair of a greater trochanter and femur fracture. Patient presents to therapy with impairments in LE strength, soft tissue tenderness, pain in the left hip and lumbar region, and functional mobility limitations with decreased balance. Patient will benefit from skilled therapy in order to make improvements in these areas. Therapy initiated exercises today and patient was able to tolerate the exercises well.     Patient stated that feels slightly sore after therapy, but she was ambulating with greater ease following manual therapy today. Patient was able to tolerate manual therapy and the exercises well today and was talkative. She was arching her back during the hip marches in supine, so therapy modified this to a heel slide with ab set to initiate.      HEP/POC compliance is  good .  Patient demonstrates understanding/independence with home program.  Patient is benefitting from skilled physical therapy and is making steady progress toward functional goals.    Goal Status:  Pt. will demonstrate/verbalize independence in self-management of condition in : 6 weeks  Pt. will be independent with home exercise program in : 6 weeks  Pt. will be able to walk : 20 minutes;around the house;with less pain;for household mobility;in 6 weeks    Pt will: demonstrate an ability to perform a sit to stand from a chair  "with left hip pain less than 3/10 within 6 weeks so that she can tolerate getting up and down from a chair at home   Pt will: Patient will improve her sit to stand score to >=10 reps on the 30 second sit to  order to reduce her fall risk within 6 weeks.       Plan / Patient Education:     Progress with home program as tolerated.     Plan for next session: Progress with soft tissue massage and progress exercises for core and leg strengthening.     Subjective:   Patient stated her back hurts today. Her right side hurts more than usual. Increase in pain may be due to the straight leg raises. Patient rode in a Jeep this weekend and it was very painful. Patient felt better following the manual therapy.   Pt reports that she is doing her exercises 2x per day.    Pain Ratin/10 R quad and L hamstring        Objective:     Patient with decreased overall mobility due to stiffness and pain.      Patient with significant LE and core weakness as demonstrated by movement and exercises.     Pt with increased tightness and tenderness over:  L lateral hip and gluteals = moderate+  L piriformis = moderate+  L ITB = moderate+    Treatment Today     Exercises: Exercises: (see flowsheet for updated exercises)  Exercise #1: Supine LTR bilaterally Bx10   Comment #1:  Supine hip bridges 1x10  Exercise #2: Seated hip marches - supine today Bx12 - modified to heel slide with ab set initiation 1x10 bilat.  Comment #2: Seated hip abduction with yellow band - in supine today Bx12  Exercise #3: Long arc quad bilaterally not today  Comment #3: Nustep- 4 mins - at level 4   Exercise #4: Standing hip extension - not today  Comment #4: Manual stretching   Exercise #5: H/S R, L x30\" in supine; piriformis R, L x30\" in supine; R, L quad x30\" in S/L   - stretches not today    Manual therapy:  See below      TREATMENT MINUTES COMMENTS   Evaluation     Self-care/ Home management     Manual therapy 18 Very gentle STM and MFR to the L lateral " hip, piriformis, and lateral thigh for muscle tightness and scar tissue mobility in supine/hooklying.   Neuromuscular Re-education     Therapeutic Activity     Therapeutic Exercises 12 See flowsheet   Gait training     Modality__________________                Total 30    Blank areas are intentional and mean the treatment did not include these items.     I attest that I was present in the room, making skilled assessments and directing the service provided today.  I was responsible for the assessment and treatment of the patient.  During this time, I was not engaged in treating another patient or doing other tasks.  Audrey Espana, PT   2/10/2020

## 2021-06-06 NOTE — PROGRESS NOTES
[facility] Daily Progress Note    Patient Name: Irene León   Date: 2/17/2020  Visit #: 7/12  Referring provider: Apolinar Mayo*  Visit Diagnosis:     ICD-10-CM    1. Chronic left hip pain M25.552     G89.29    2. Abnormality of gait R26.9    3. Chronic bilateral low back pain without sciatica M54.5     G89.29    4. Weakness of left hip R29.898    5. Muscle weakness (generalized) M62.81          Assessment:     From eval:   Patient is a 74 year old female who presents to PT with low back pain and left hip pain. She has had this left hip pain for the last 4-5 years following surgeries of a RAY and repair of a greater trochanter and femur fracture. Patient presents to therapy with impairments in LE strength, soft tissue tenderness, pain in the left hip and lumbar region, and functional mobility limitations with decreased balance. Patient will benefit from skilled therapy in order to make improvements in these areas. Therapy initiated exercises today and patient was able to tolerate the exercises well.     Patient stated that feels slightly sore after therapy, but she was ambulating with greater ease following manual therapy today. Patient was able to tolerate manual therapy and the exercises well today and was talkative. She was arching her back during the hip marches in supine, so therapy modified this to a heel slide with ab set to initiate.      HEP/POC compliance is  good .  Patient demonstrates understanding/independence with home program.  Patient is benefitting from skilled physical therapy and is making steady progress toward functional goals.    Goal Status:  Pt. will demonstrate/verbalize independence in self-management of condition in : 6 weeks  Pt. will be independent with home exercise program in : 6 weeks  Pt. will be able to walk : 20 minutes;around the house;with less pain;for household mobility;in 6 weeks    Pt will: demonstrate an ability to perform a sit to stand from a chair  "with left hip pain less than 3/10 within 6 weeks so that she can tolerate getting up and down from a chair at home   Pt will: Patient will improve her sit to stand score to >=10 reps on the 30 second sit to  order to reduce her fall risk within 6 weeks.       Plan / Patient Education:     Progress with home program as tolerated.     Plan for next session: Progress with soft tissue massage and progress exercises for core and leg strengthening.     Subjective:     Felt pretty good after last visit. Left quad felt better.  Today right vastus lateralis and knee are sore.    Pain Ratin/10 R and L quad and hamstring        Objective:     Patient with decreased overall mobility due to stiffness and pain.      Patient with significant LE and core weakness as demonstrated by movement and exercises.     Pt with increased tightness and tenderness over:  L lateral hip and gluteals = moderate+  L piriformis = moderate+  L ITB = moderate+    Treatment Today     Exercises: Exercises: (see flowsheet for updated exercises)  Exercise #1: Supine LTR bilaterally Bx10   Comment #1:  Supine hip bridges 1x10  Exercise #2: Seated hip marches - supine today Bx12 - modified to heel slide with ab set initiation 1x10 bilat.  Comment #2: Seated hip abduction with yellow band - in supine today Bx12  Exercise #3: Long arc quad bilaterally not today  Comment #3: Nustep- 4 mins - at level 4 with discussion of symptoms, progress and HEP.  Exercise #4: Standing hip extension - not today  Comment #4: Manual stretching hip flexors, quads, hamstrings, piriformis 30\" x 2 bilateral  Exercise #5: H/S R, L x30\" in supine; piriformis R, L x30\" in supine; R, L quad x30\" in S/L   y    Manual therapy:  MFR layers 1-3 bilateral: glut max, TFL, quad, hamstring     TREATMENT MINUTES COMMENTS   Evaluation     Self-care/ Home management     Manual therapy 20    Neuromuscular Re-education     Therapeutic Activity     Therapeutic Exercises 10    Gait " training     Modality__________________                Total 30    Blank areas are intentional and mean the treatment did not include these items.       Emir Hendrickson, PT   2/17/2020

## 2021-06-06 NOTE — PROGRESS NOTES
Clinic Note    Assessment:     Assessment and Plan:    1. Cardiomyopathy, idiopathic (H)  Broken heart syndrome.  She tells me today that she takes a 20 mg dose of her furosemide every Monday, then 10 mg daily the rest of the days of the week.  She appears well compensated today.  She was due for BMP recheck today but was running late for a different appointment and will call and reschedule.    2. Essential hypertension  Blood pressure 144/78 today.  We will hold steady on her metoprolol succinate 50 mg, and losartan 50 mg.  She wants to call and reschedule BMP    3. Pulmonary emphysema, unspecified emphysema type (H)  Soon to start Xolair, managed by pulmonologist at [facility]    4. S/P hip replacement, left  She has been using more ibuprofen lately.  She is now using 1 dose in the morning, and 1 dose in the evening.  She does not see relief from Tylenol or oxycodone.  She does feel like PT is helping.  I warned her about renal toxicity with NSAID use.       Patient Instructions   Re-check labs today.     Try to limit ibuprofen as much as you can, try to limit use to no more than one week from today.      Vital signs OK today.     Follow through with pulmonology to discuss xolair start.     Try gargling salt water or drinking warm fluids.     Follow up with Dr. Montanez in three months. Try gargling salt water or warm fluids.        Return in about 3 months (around 6/11/2020).         Subjective:      Irene León is a 75 y.o. female who comes to clinic today for routine follow-up.    She last saw her PCP on 2/14, approximately 1 month ago.    She has a history of broken heart syndrome with fluctuating blood pressure recently.  On 2/14, her blood pressure was 120/70.  Today, 144/78.    2/14 note made mention of furosemide 10 mg daily.  Today, patient admits that she usually takes furosemide 20 mg on Mondays, then 10 mg daily the rest of the week.  She admits that she does not routinely monitor her salt intake on a  regular basis.    She denies any chest pain.  He does have some mild shortness of breath at her baseline.    Again refuses sleep evaluation today.    Using losartan 50 mg daily as prescribed.    She sees a pulmonologist at [facility].  There is been some discussion about starting Xolair for her COPD, patient has not yet started those injections.  Apparently, they have been cleared by her insurance but they are waiting for delivery to the pulmonology clinic.    She does have a slight cough today.  Using azithromycin every day.  Doing nasal sprays.    Continues with physical therapy for her left hip pain.  Does not see much benefit from oxycodone.    She did twist her left ankle tripping on the carpet about a week ago.  Does not feel like she has suffered a fracture but does now walk with a cane regularly for extra support.  Was using ibuprofen 200 mg nightly for pain control but now takes extra doses in the morning with most recent injury.  Does not see relief from Tylenol.    The following portions of the patient's history were reviewed and updated as appropriate: Allergies, medications, problems, prior note.    Review of Systems:    Review is otherwise negative except for what is mentioned above.     Social Hx:    Social History     Tobacco Use   Smoking Status Former Smoker     Last attempt to quit: 10/28/2007     Years since quittin.3   Smokeless Tobacco Former User     Quit date: 2004         Objective:     Vitals:    20 1008   BP: 144/78   Pulse: (!) 50       Exam:    General: No apparent distress. Calm. Alert and Oriented X3. Pt behavior is appropriate.  Patient is in a wheelchair.  Chest/Lungs: Normal chest wall, clear to auscultation, normal respiratory effort and rate.   Heart/Pulses: Regular rate and rhythm, strong and equal radial pulses, no murmurs. Capillary refill <2 seconds.  +1 pitting edema bilateral lower extremities      Patient Active Problem List   Diagnosis     Leg pain, left      Femur fracture (H)     Hypertension     Pulmonary emphysema, unspecified emphysema type (H)     Hyperlipidemia     Bladder incontinence     Cardiomyopathy, idiopathic (H)     Depression with anxiety     Family history of blood disease     GERD (gastroesophageal reflux disease)     Post menopausal syndrome     OP (osteoporosis)     Osteoarthritis of hip     Insomnia     Dyspnea     Malignant neoplasm of central portion of left female breast (H)     Pleural nodules     Hot flashes due to tamoxifen     COPD exacerbation (H)     Herpes zoster without complication     Chest wall pain     Benign essential hypertension     Dyslipidemia     Acute respiratory failure with hypoxia (H)     Factor 5 Leiden mutation, heterozygous (H)     CKD (chronic kidney disease) stage 3, GFR 30-59 ml/min (H)     Centrilobular emphysema (H)     Anisocoria     Herniated intervertebral disc     Hot flashes, menopausal     OAB (overactive bladder)     Physical deconditioning     Pulsatile tinnitus of both ears     Chest pain     Acute hypoxemic respiratory failure (H)     S/P hip replacement, left     Hip pain, left     Current Outpatient Medications   Medication Sig Dispense Refill     acetaminophen (TYLENOL) 500 MG tablet Take 1-2 tablets (500-1,000 mg total) by mouth every 6 (six) hours as needed.  0     ascorbic acid, vitamin C, (VITAMIN C) 1000 MG tablet Take 1,000 mg by mouth daily. Airborne 1 tab daily       aspirin 81 MG EC tablet Take 81 mg by mouth daily.       azelastine (ASTELIN) 137 mcg (0.1 %) nasal spray 2 sprays into each nostril daily.       azithromycin (ZITHROMAX) 250 MG tablet Take 250 mg by mouth daily.       benzonatate (TESSALON) 200 MG capsule Take 1 capsule (200 mg total) by mouth 3 (three) times a day. 60 capsule 2     chlorhexidine (PERIDEX) 0.12 % solution Apply 15 mL to the mouth or throat at bedtime.              cholecalciferol, vitamin D3, 1,000 unit tablet Take 1,000 Units by mouth daily.       cyanocobalamin,  vitamin B-12, (VITAMIN B-12 ORAL) Take 1,000 mcg by mouth daily.              EPINEPHrine (EPIPEN/ADRENACLICK/AUVI-Q) 0.3 mg/0.3 mL injection Inject 0.3 mg into the shoulder, thigh, or buttocks.       famotidine (PEPCID) 20 MG tablet Take 1 tablet by mouth 2 (two) times a day.       fluticasone (FLONASE) 50 mcg/actuation nasal spray Apply 1 spray into each nostril as needed.        fluticasone-salmeterol (ADVAIR) 500-50 mcg/dose DISKUS Inhale 1 puff 2 (two) times a day.       furosemide (LASIX) 20 MG tablet Take 1 tablet (20 mg total) by mouth 2 (two) times a day at 9am and 6pm. 180 tablet 3     gabapentin (NEURONTIN) 300 MG capsule Take 1 capsule (300 mg total) by mouth 2 (two) times a day. (Patient taking differently: Take 600 mg by mouth 2 (two) times a day. ) 180 capsule 1     ipratropium (ATROVENT HFA) 17 mcg/actuation inhaler Inhale 2 puffs every 6 (six) hours. 3 Inhaler 3     ipratropium-albuterol (DUO-NEB) 0.5-2.5 mg/3 mL nebulizer 1 neb 4 times a day until seen by primary and then as needed or as directed 60 vial 1     lidocaine (LIDODERM) 5 % Place 1 patch on the skin daily. To left hip (Patient taking differently: Place 1 patch on the skin daily as needed. To left hip) 30 patch 3     losartan (COZAAR) 50 MG tablet Take 1 tablet (50 mg total) by mouth daily. 90 tablet 0     metoprolol succinate (TOPROL-XL) 50 MG 24 hr tablet Take 1.5 tablets (75 mg total) by mouth daily. 135 tablet 3     mirabegron (MYRBETRIQ) 50 mg Tb24 ER tablet Take 1 tablet (50 mg total) by mouth daily. 90 tablet 3     NON FORMULARY Take 1 Tablet 24 Hour Sustained Release by mouth daily. Airborne, 1 tablet orally daily       omalizumab (XOLAIR) 150 mg/mL Syrg injection Inject 300 mg under the skin.       pantoprazole (PROTONIX) 40 MG tablet Take 1 tablet (40 mg total) by mouth daily. 90 tablet 2     PROVENTIL HFA 90 mcg/actuation inhaler Inhale 1 puff every 6 (six) hours as needed.        simvastatin (ZOCOR) 40 MG tablet Take 1 tablet  (40 mg total) by mouth at bedtime. 90 tablet 3     sodium chloride (OCEAN) 0.65 % nasal spray 2 sprays into each nostril as needed.       solifenacin (VESICARE) 5 MG tablet Take 1 tablet (5 mg total) by mouth at bedtime. 90 tablet 3     tamoxifen (NOLVADEX) 20 MG tablet TAKE 1 TABLET DAILY 90 tablet 4     traMADoL (ULTRAM) 50 mg tablet Take 1 tablet (50 mg total) by mouth 2 (two) times a day. 60 tablet 1     traZODone (DESYREL) 50 MG tablet Take 1 tablet (50 mg total) by mouth at bedtime as needed. 90 tablet 1     triamcinolone (KENALOG) 0.1 % cream Apply to rash on arms twice daily for one week 30 g 0     triamcinolone (KENALOG) 0.5 % ointment Apply o foot twice a day 30 g 1     venlafaxine (EFFEXOR-XR) 150 MG 24 hr capsule TAKE 1 CAPSULE DAILY 90 capsule 4     Current Facility-Administered Medications   Medication Dose Route Frequency Provider Last Rate Last Dose     denosumab 60 mg (PROLIA 60 mg/ml)  60 mg Subcutaneous Q6 Months Suzanna Shane MD   60 mg at 02/04/20 1352           Aakash Flores CNP (Rob)    3/11/2020

## 2021-06-06 NOTE — PROGRESS NOTES
[facility] Daily Progress Note    Patient Name: Irene León   Date: 3/2/2020  Visit #: 8/12  Referring provider: Pankaj Montanez MD  Visit Diagnosis:     ICD-10-CM    1. Chronic left hip pain M25.552     G89.29    2. Abnormality of gait R26.9    3. Chronic bilateral low back pain without sciatica M54.5     G89.29    4. Weakness of left hip R29.898    5. Muscle weakness (generalized) M62.81          Assessment:     From eval:   Patient is a 74 year old female who presents to PT with low back pain and left hip pain. She has had this left hip pain for the last 4-5 years following surgeries of a RAY and repair of a greater trochanter and femur fracture. Patient presents to therapy with impairments in LE strength, soft tissue tenderness, pain in the left hip and lumbar region, and functional mobility limitations with decreased balance. Patient will benefit from skilled therapy in order to make improvements in these areas. Therapy initiated exercises today and patient was able to tolerate the exercises well.     Pt reviewed some of the pt's HEP exercises.  Pt was cued to slowly increase her repetitions on the strengthening exercises to allow for continued gains in her strength.      Patient tolerated manual therapy  well, and had minimal pain during MT today. She reports decreased pain post treatment.    HEP/POC compliance is  good .  Patient demonstrates understanding/independence with home program.  Patient is benefitting from skilled physical therapy and is making steady progress toward functional goals.    Goal Status:  Pt. will demonstrate/verbalize independence in self-management of condition in : 6 weeks  Pt. will be independent with home exercise program in : 6 weeks  Pt. will be able to walk : 20 minutes;around the house;with less pain;for household mobility;in 6 weeks    Pt will: demonstrate an ability to perform a sit to stand from a chair with left hip pain less than 3/10 within 6 weeks so that she  can tolerate getting up and down from a chair at home   Pt will: Patient will improve her sit to stand score to >=10 reps on the 30 second sit to  order to reduce her fall risk within 6 weeks.       Plan / Patient Education:     Progress with home program as tolerated.     Plan for next session: Progress with soft tissue massage and progress exercises for core and leg strengthening.     Subjective:     Pain Ratin/10    Pt reports that she is in a wheelchair today because when she fell over the weekend and twisted her left foot.  She has been having a lot of pain with walking.  She has not seen the doctor, but she feels like her foot will be better in a week.      Pt reports that her L hip and scar tissue is feeling much better.  She notices less pain when she is walking.  However, her low back pain continues to be problematic.  She also has been having R piriformis pain and she has been doing stretches for this area as well.      Pt reports that she has been consistent with her HEP.       Objective:     Patient with decreased overall mobility due to stiffness and pain.  Patient in wheel chair today.    Patient with significant LE and core weakness as demonstrated by movement and exercises.     Pt with continued tightness and tenderness over:  L anterior thigh = moderate+  L ITB = moderate+  L piriformis = moderate+  R piriformis = moderate+     Treatment Today     Exercises: Exercises: (see flowsheet for updated exercises)  Exercise #1: Supine LTR bilaterally Bx10 after MT  Comment #1:  Supine hip bridges 1x10  Exercise #2: Seated hip marches - supine today Bx12 - modified to heel slide with ab set initiation 1x10 bilat.- not today  Comment #2: Seated hip abduction with yellow band - in supine today Bx12- not today  Exercise #3: Long arc quad bilaterally Bx10 with L ankle pump   Comment #3: Nustep  Exercise #4: Standing hip extension - not today  Comment #4: Manual stretching hip flexors, quads,  "hamstrings, piriformis 30\" x 2 bilateral  Exercise #5: Bridges 1x12       Manual therapy:  See below     TREATMENT MINUTES COMMENTS   Evaluation     Self-care/ Home management     Manual therapy 14 STM and MFR to the L piriformis, lateral hip and lumbar muscles in R S/L   STM to the piriformis/gluteal in supine    Neuromuscular Re-education     Therapeutic Activity     Therapeutic Exercises 14 See flow sheet    Gait training     Modality__________________                Total 28    Blank areas are intentional and mean the treatment did not include these items.       Audrey Espana, PT   3/2/2020  "

## 2021-06-06 NOTE — PROGRESS NOTES
Patient presents to the clinic today for a follow up with Apolinar Mayo MD regarding low back, bilateral hips left foot, left side of quad muscle pain. Patient describes their pain as constant, burning, and stabbing. She rates her pain 7/10. Her/His function score is 4.

## 2021-06-07 ENCOUNTER — COMMUNICATION - HEALTHEAST (OUTPATIENT)
Dept: INTERNAL MEDICINE | Facility: CLINIC | Age: 76
End: 2021-06-07

## 2021-06-07 DIAGNOSIS — R32 URINARY INCONTINENCE, UNSPECIFIED TYPE: ICD-10-CM

## 2021-06-07 NOTE — TELEPHONE ENCOUNTER
Refill request received from patient's [facility] pharmacy via fax. Med order pended as requested.

## 2021-06-07 NOTE — TELEPHONE ENCOUNTER
Medication Question or Clarification  Who is calling: Patient  What medication are you calling about (include dose and sig)?:   solifenacin (VESICARE) 5 MG tablet  90 tablet  3  1/29/2020      Sig - Route: Take 1 tablet (5 mg total) by mouth at bedtime. - Oral     Sent to pharmacy as: solifenacin 5 mg tablet (VESICARE)     E-Prescribing Status: Receipt confirmed by pharmacy (1/29/2020 12:54 PM CST)         Who prescribed the medication?:   Pankaj Montanez MD  What is your question/concern?:   Please re-send or re-fax above script as [facility] does not have it.  Patient has been out of medication.  Requested Pharmacy: [facility]  Okay to leave a detailed message?: Yes

## 2021-06-08 ENCOUNTER — OFFICE VISIT - HEALTHEAST (OUTPATIENT)
Dept: FAMILY MEDICINE | Facility: CLINIC | Age: 76
End: 2021-06-08

## 2021-06-08 DIAGNOSIS — I50.22 CHRONIC SYSTOLIC CONGESTIVE HEART FAILURE (H): ICD-10-CM

## 2021-06-08 DIAGNOSIS — R07.81 RIB PAIN ON LEFT SIDE: ICD-10-CM

## 2021-06-08 DIAGNOSIS — J44.1 COPD EXACERBATION (H): ICD-10-CM

## 2021-06-08 DIAGNOSIS — I10 ESSENTIAL HYPERTENSION, BENIGN: ICD-10-CM

## 2021-06-08 RX ORDER — TRAMADOL HYDROCHLORIDE 50 MG/1
50 TABLET ORAL EVERY 6 HOURS PRN
Qty: 30 TABLET | Refills: 0 | Status: SHIPPED | OUTPATIENT
Start: 2021-06-08 | End: 2021-07-29

## 2021-06-08 RX ORDER — IPRATROPIUM BROMIDE AND ALBUTEROL SULFATE 2.5; .5 MG/3ML; MG/3ML
3 SOLUTION RESPIRATORY (INHALATION) 4 TIMES DAILY
Qty: 120 VIAL | Refills: 1 | Status: ON HOLD | OUTPATIENT
Start: 2021-06-08 | End: 2022-04-22

## 2021-06-08 NOTE — TELEPHONE ENCOUNTER
There is nothing in the pulmonologist note that raises concern of a cardiac source for her dyspnea but I would recommend a follow-up in person visit in the clinic.  We can check a BNP at that time.  Can also consider EP consultation regarding biventricular pacing.    RENAE

## 2021-06-08 NOTE — TELEPHONE ENCOUNTER
"Per Samantha WHITE CNP's 1-28-20 visit note \"Irene León will follow up with Dr. Chilel in May and in the heart failure clinic in 6 months or sooner if needed.\" - no appt scheduled or pending.    Please review pulmonologist 6-8-20 visit note and address what type of appt should be scheduled.  Thanks  mg  "

## 2021-06-08 NOTE — TELEPHONE ENCOUNTER
----- Message from Kendy Leonardo sent at 6/10/2020  2:19 PM CDT -----  General phone call:    Caller: Patient  Primary cardiologist: Dr. Chilel   Detailed reason for call: Patient's Dr. Dietrich suggested for patient to see Dr. Chilel to check if her heart is ok?  She is experiencing shortness of breath while on oxygen.    New or active symptoms? shortness of breath while on oxygen  Best phone number: [phone]  Best time to contact: Anytime  Ok to leave a detailed message? anytime  Device? no    Additional Info: Should she be seen in office or telephone visit.

## 2021-06-08 NOTE — TELEPHONE ENCOUNTER
Message sent to  with instructions to contact patient and arrange clinic visit with Dr. Chilel on 6-22-20.  mg

## 2021-06-08 NOTE — TELEPHONE ENCOUNTER
Can appt wait until 6-22-20 when you are clinic provider or should she be scheduled with another provider sooner?  mg

## 2021-06-09 ENCOUNTER — COMMUNICATION - HEALTHEAST (OUTPATIENT)
Dept: FAMILY MEDICINE | Facility: CLINIC | Age: 76
End: 2021-06-09

## 2021-06-09 NOTE — PROGRESS NOTES
Lung Cancer Screening pre-scan counseling Visit    The patient fits the risk profile of patients who benefit from this screening:  -The patient is >55 years old and <80 years old  -The patient has 45 pack year history (over 30)  -The patient has smoked within the past 15 years--quit 10 years ago, 2007.  -The patient has no medical comorbidity severe enough that it would cause mortality prior to mortality due to the lung cancer attempting to be detected.    Discussion with patient regarding the harms associated with LDCT screening include false-negative and false-positive results, incidental findings, overdiagnosis, and radiation exposure were reviewed at length.   The patient understands that pursuing this screening test may result in a biopsy that was not necessary. It may also produced added stress over a nodule that is likely not cancer.    Of 100 patients who get screening, 25 will have a positive scan. Of those 25, only 1 will have cancer.  Overdiagnosis is estimated at 10% of patients-- they would not have been detected in the patient's lifetime without screening. Less than 1% of patients likely had death related to radiation exposure increase.   Average low-dose CT associated with 0.61 to 1.5 mSv. Annual background radiation exposure in the [place] averages 2.4 mS; mammogram is 0.7mSv.    The benefits are reduction in risk of death from lung cancer. The number needed to treat is 320 (for every 320 patients who undergo screening, 1 patient will have a benefit in mortality from early detection from the screening).    Undergoing this screening implies willingness to pursue further potentially invasive testing to discover potential cancer.    All questions were answered.    The patient s results will be followed in our pulmonary registry and will be recalled based on the findings of the CT scan.    **Addendum: CT scan found largest pulmonary nodules 3x5mm in left lower lobe and two RUL nodules only 3mm in  dimension.     There was also a small amount of fluid within bronchus intermedius and RML but no evidence of pneumonia.    Called patient to notify her that CT scan will be repeated in 6 months. She wants to have the CT scan done at [place] so our clinic will arrange for this and results will be forwarded to Dr. Bryant.    Christy Berger MD  Electronically signed on 3/15/2017 5:50 PM

## 2021-06-09 NOTE — PATIENT INSTRUCTIONS - HE
No change in medical treatment plan.  Finish the prednisone as prescribed.    Use your oxygen more often if you are having increasing shortness of breath.    If you are having increasing shortness of breath, especially if your legs are swollen or gaining weight, you could take 1 extra furosemide 20 mg tablet and contact clinic for further evaluation.    Maintain a low-salt diet and regular daily walking at your home.    You have been scheduled for a virtual video follow-up appointment on September 17 at 10:20 AM.    Try to avoid ibuprofen and Aleve use, as that can affect your kidney function and blood pressure.  It can also increase bleeding and bruising.

## 2021-06-09 NOTE — TELEPHONE ENCOUNTER
Wellness Screening Tool  Symptom Screening:  Do you have one of the following NEW symptoms:    Fever (subjective or >100.0)?  No    A new cough?  No    Shortness of breath?  No     Chills? No     New loss of taste or smell? No     Generalized body aches? No     New persistent headache? No     New sore throat? No     Nausea, vomiting, or diarrhea?  No    Within the past 3 weeks, have you been exposed to someone with a known positive illness below:    COVID-19 (known or suspected)?  No    Chicken pox?  No    Mealses?  No    Pertussis?  No    Patient notified of visitor restrictions: Yes  Pt informed to wear a mask: Yes  Pt notified if they develop any symptoms listed above, prior to their appointment, they are to call the clinic directly at [phone] for further instructions.  Yes  Patient's appointment status: Patient will be seen in clinic as scheduled on Mon. 6-22-20 @ 1120 in [facility] with Dr. Chilel.  mg

## 2021-06-09 NOTE — PROGRESS NOTES
HPI:  She notes a foul smell for 2 weeks.  History of sinus infections in the past but nothing recent.  History of sinus surgery in the past.      Past medical history, surgical history, social history, family history, medications, and allergies have been reviewed with the patient and are documented above.    Review of Systems: a 10-system review was performed. Pertinent positives are noted in the HPI and on a separate scanned document in the chart.    PHYSICAL EXAMINATION:  GEN: no acute distress, normocephalic  EYES: extraocular movements are intact, pupils are equal and round. Sclera clear.   EARS: auricles are normally formed. The external auditory canals are clear with minimal to no cerumen. Tympanic membranes are intact bilaterally with no signs of infection, effusion, retractions, or perforations.  NOSE: anterior nares are patent. There are no masses or lesions. The septum is non-obstructing.  PROCEDURE  Flexible nasopharyngoscopy  The nose is prepped with 4% lidocaine with 1% phenylephrine.  The nose is intubated in the both nares with the scope.  Shows postoperative changes to maxillary sinuses.  Left antrostomy shows green crusting, likely with infection.    NEURO: CN II-XII are intact bilaterally. alert and oriented. No nystagmus. Gait is normal.  PULM: breathing comfortably on room air, normal chest expansion with respiration  HEART: regular rate and rhythm, no peripheral edema      MEDICAL DECISION-MAKING: Left maxillary sinusitis.  Will have her rinse with saline and will call in prescription for oral antibiotics.  She also had questions about her dysphonia that we addressed with the last visit and will get her set up with Speech Pathology for videostroboscopy.

## 2021-06-09 NOTE — PROGRESS NOTES
[facility] Hematology and Oncology Progress Note    Patient: Irene León  MRN: [id number]  Date of Service: 7/14/2020      Reason for Visit    Chief Complaint   Patient presents with     HE Cancer     Breast Cancer       Assessment and Plan  Cancer Staging  Malignant neoplasm of central portion of left female breast (H)  Staging form: Breast, AJCC 7th Edition  - Pathologic stage from 7/26/2017: Stage IA (T1b, N0, cM0) - Signed by Devon Suarez MD on 8/1/2017  ER Status: Positive  MA Status: Positive  HER2 Status: Negative      ECOG Performance   ECOG Performance Status: 1     Distress Assessment  Distress Assessment Score: No distress    Pain  Currently in Pain: Yes  Pain Score (Initial OR Reassessment): 4  Location: hips-L>R    #.  Invasive ductal carcinoma of the left breast.   Stage IA (pT1b, pN0 (sn),cM0). grade 1, associated DCIS, ER/MA positive, HER-2 negative, s/p left breast lumpectomy and left axillary sentinel lymph node biopsy. Right breast atypical ductal hyperplasia s/p right breast excisional biopsy on 6/20/2017.  She has several comorbidities including nonischemic cardiomyopathy (EF 40% in 3/17), osteoporosis on treatment with oral bisphosphonate, factor V Leiden mutation (details unknown). Started Tamoxifen in 12/2017.      No clinical or mammographic evidence of breast cancer recurrence.  She takes tamoxifen regularly with manageable side effects of hot flashes.  She is now being on tamoxifen for 25 years.  She is comfortable continuing it at this point.    Follow-up clinical exam in 6 months.     Annual screening mammogram in May.        #.  Hot flashes    She is on venlafaxine and gabapentin at bedtime.  Overall stable.       Problem List    1. Malignant neoplasm of central portion of left breast in female, estrogen receptor positive (H)     2. Hot flashes due to tamoxifen        ______________________________________________________________________________    Diagnosis  6/20/17  Stage IA  (pT1b, pN0(sn), cM0) invasive ductal carcinoma of the left breast  - ER (high positive, 98%), ME (high positive, 97%) HER2 (negative, score of 1+ by IHC)  - Leena grade 1  - Tumor size: <1 cm  - Margin-negative on final margin with reexcision for invasive carcinoma but close margin for DCIS of 0.2 cm from new medial margin.    - 1 sentinel lymph node removed, negative for carcinoma  - associated DCIS  - Right breast atypical ductal hyperplasia.    - Oncotype DX recurrence score 6 : 10 year risk of recurrence with 5 year of tamoxifen is 5%.    Therapy to date:  6/20/17 -right breast excisional biopsy AND left breast lumpectomy, left axillary sentinel lymph node biopsy  6/30/17-reexcision lumpectomy of the left breast  9/8/17-completed adjuvant radiation to the left breast 4256 cGy/16  12/6/17-initiated adjuvant endocrine therapy with tamoxifen.    Comorbidities  #.  Mild hypoxia and chronic cough.   She is closely follow with pulmonologist at [facility].    #. Hypertension, controlled.  #.  Osteoporosis  #.  COPD  #.  Idiopathic cardiomyopathy- LVEF 40% in October 2018, no change from prior.    History of Present Illness    Stephanie presented herself today.  She still has hot flashes, night sweats and they are overall stable and unchanged.  Bilateral lower extremity edema.  She denies any intolerable side effects from tamoxifen.  She denies any new bone pain, headaches.      Pain Status  Currently in Pain: Yes    Review of Systems    Constitutional  Constitutional (WDL): Exceptions to WDL  Fatigue: None  Fever: None  Chills: None  Weight Gain: 5 - <10% from baseline(5# 7/4/20)  Weight Loss: None  Neurosensory  Neurosensory (WDL): Exceptions to WDL  Peripheral Motor Neuropathy: None  Ataxia: Asymptomatic, clinical or diagnostic observations only, intervention not indicated(uses cane, w/c for longer distances)  Peripheral Sensory Neuropathy: None  Confusion: None  Syncope: None  Eye   Eye Disorder (WDL): Assessment not  pertinent to visit  Ear  Ear Disorder (WDL): Assessment not pertinent to visit  Cardiovascular  Cardiovascular (WDL): Exceptions to WDL  Palpitations: None  Edema: Yes  Edema Limbs: 5 - 10% inter-limb discrepancy in volume or circumference at point of greatest visible difference, swelling or obscuration of anatomic architecture on close inspection(bilat feet, ankles)  Phlebitis: None  Superficial thrombophlebitis: None  Pulmonary  Respiratory (WDL): Exceptions to WDL  Cough: Mild symptoms, nonprescription intervention indicated  Dyspnea: Shortness of breath with minimal exertion, limiting instrumental ADL  Hypoxia: Decreased oxygen saturation with exercise (e.g., pulse oximeter <88%), intermittent supplemental oxygen(uses 3 lpm NC)  Gastrointestinal  Gastrointestinal (WDL): Exceptions to WDL  Anorexia: None  Constipation: None  Diarrhea: None  Dysphagia: None  Esophagitis: Symptomatic, altered eating/swallowing, oral supplements indicated(occ-meds control)  Nausea: None  Pharyngitis: None  Vomiting: None  Dysgeusia: None  Dry Mouth: None  Genitourinary  Genitourinary (WDL): All genitourinary elements are within defined limits  Lymphatic  Lymph (WDL): All lymph disorder elements are within defined limits  Musculoskeletal and Connective Tissue  Musculoskeletal and Connetive Tissue Disorders (WDL): Exceptions to WDL  Arthralgia: Mild pain(hips, L>R)  Bone Pain: None  Muscle Weakness : None  Myalgia: None  Integumentary  Integumentary (WDL): All integumentary elements are within defined limits  Patient Coping  Patient Coping: Accepting;Open/discussion  Distress Assessment  Distress Assessment Score: No distress  Accompanied by  Accompanied by: Alone  Oral Chemo Adherence       Past History  Past Medical History:   Diagnosis Date     Arrhythmia      Breast cancer (H) 2017     CHF (congestive heart failure) (H)      COPD (chronic obstructive pulmonary disease) (H)      Coronary artery disease      GERD (gastroesophageal  reflux disease)      Hx of radiation therapy 2017     Hyperlipidemia      Hypertension      Idiopathic cardiomyopathy (H)        Physical Exam    Recent Vitals 7/14/2020   Height -   Weight 151 lbs 6 oz   BSA (m2) 1.73 m2   /74   Pulse 80   Temp 97.4   Temp src 1   SpO2 88   Some recent data might be hidden     General: alert, awake, not in acute distress  HEENT: Head: Normal, normocephalic, atraumatic.  Eye: Normal external eye, conjunctiva, lids cornea, CATINA.  Ears:  Non-tender.  Nose: Normal external nose, mucus membranes and septum.  Pharynx: Dental Hygiene adequate. Normal buccal mucosa. Normal pharynx.  Neck / Thyroid: Supple, no masses, nodes, nodules or enlargement.  Lymphatics: No abnormally enlarged lymph nodes.  Chest: Normal chest wall and respirations. Clear to auscultation.  Breasts: Postsurgical scar with underlying  stitches or nodule in the right lower breast, postsurgical scar on the left periareolar area are unchanged from prior exam.  No new palpable adenopathy.  Heart: S1 S2 RRR, no murmur.   Abdomen: abdomen is soft without significant tenderness, masses, organomegaly or guarding  Extremities: normal strength, tone, and muscle mass  Skin: normal. no rash or abnormalities  CNS: non focal.    Lab Results    No results found for this or any previous visit (from the past 168 hour(s)).    Imaging    Xr Chest 1 View Portable    Result Date: 7/4/2020  EXAM: XR CHEST 1 VIEW PORTABLE LOCATION: [facility] DATE/TIME: 7/4/2020 9:23 AM INDICATION: short of breath COMPARISON: Chest x-ray 6/24/2020     Small right-sided pleural effusion with adjacent atelectasis/infiltrates. The left lung is grossly clear. Stable heart size. Old healed right-sided rib fractures.    Xr Chest 2 Views    Result Date: 6/24/2020  EXAM: XR CHEST 2 VIEWS LOCATION: [facility] DATE/TIME: 6/24/2020 1:36 PM INDICATION: Chronic pulmonary embolism COMPARISON: CT pulmonary angiogram 6/12/2020 and nuclear medicine  ventilation/perfusion study 6/24/2020     Conspicuous left ventricle. Other cardiac borders are normal. Atheromatous aortic calcifications. Mediastinal borders are well-defined. Unchanged enlargement of central pulmonary arteries. Hyperlucent hyperexpanded lungs consistent with underlying emphysema. No new airspace opacities. No pleural fluid or thickening is present. Several old right posterolateral rib fracture deformities are present.    Nm Lung Perfusion Scan    Result Date: 6/24/2020  EXAM: NM LUNG PERFUSION SCAN LOCATION: [facility] DATE/TIME: 6/24/2020 1:29 PM INDICATION: Chronic pulmonary embolism COMPARISON: CTA of the chest dated 06/12/2020 and chest x-ray dated 06/24/2020 TECHNIQUE: 8.5 mCi Tc-99m MAA IV. Lung quantification was performed utilizing the geometric mean of the anterior and posterior images. FINDINGS: The left lung contributes 43.72% and the right lung 56.28% to the total lung perfusion. The % relative lung perfusions are calculated to be: Left upper third: 11.07%, right upper third 14.63% Left middle third 22.80%, right middle third 27.12% Left lower third 9.85%, right lower third 14.53%  Subtle small wedge-shaped defects in the left upper lobe, basilar segment of the right lower lobe, and posterior segment of the left lower lobe which may represent sequelae of prior emboli given the findings on recent CTA of the chest dated 06/12/2020     TT: 26 minutes: time consisted of medical record review, examination of patient, completing documentation and counseling time on on medication side effects management, diet, exercise management, follow-up plan.    Signed by: Devon Suarez MD

## 2021-06-09 NOTE — PROGRESS NOTES
"Irene León is a 75 y.o. female who is being evaluated via a billable video visit.      The patient has been notified of following:     \"This video visit will be conducted via a call between you and your physician/provider. We have found that certain health care needs can be provided without the need for an in-person physical exam.  This service lets us provide the care you need with a video conversation.  If a prescription is necessary we can send it directly to your pharmacy.  If lab work is needed we can place an order for that and you can then stop by our lab to have the test done at a later time.    Video visits are billed at different rates depending on your insurance coverage. Please reach out to your insurance provider with any questions.    If during the course of the call the physician/provider feels a video visit is not appropriate, you will not be charged for this service.\"    Patient has given verbal consent to a Video visit? Yes  How would you like to obtain your AVS? AVS Preference: Mail a copy.  Patient would like the video invitation sent by: Text to cell phone: [phone]   Will anyone else be joining your video visit? No        Video Start Time: 9:36 AM    Additional provider notes:  [facility] Follow Up Note    Irene León   75 y.o. female    Date of Visit: 7/9/2020    Chief Complaint   Patient presents with     Emergency Department Follow Up     WW for COPD on 7/4. On day 3 of Prednisone.     Erik Brown had requested a virtual follow-up appointment to avoid clinic visit during the coronavirus outbreak.    Patient was in the emergency room on July 4 for increasing shortness of breath, possibly COPD exacerbation.    I did review the ER note from July 4.  I did review the chest x-ray from July 4 with showed a small right pleural effusion but otherwise clear lungs.  Lab work from July 4 showed a stable creatinine of 1.48 with a normal sodium potassium.  Blood sugar " 96.    White count 11.2 and hemoglobin 14.5.  BNP of 538.  That is similar to her baseline.    She does have a cardiomyopathy, felt to be a broken heart syndrome with systolic and diastolic dysfunction.    I did review the last echo from November 2019 which was stable from previous.  39% ejection fraction and abnormal diastolic dysfunction.  No valve problems.    I did review the chest CT scan from June 12, 2020.  There is no acute pulmonary embolism.  Several small caliber subsegmental pulmonary arteries in the lower lobes which were poorly opacified and possibly consistent with chronic emboli.    She had previously had a 5 x 4 mm right upper lobe opacity, but no new nodules were noted on the CT scan.    Patient had had several days of increasing shortness of breath and dyspnea on exertion.  No fever.  Some increased cough but minimally productive.  No hemoptysis.  No chest pain or palpitations.  No increasing edema.    She continues on her Advair and inhalers.  She has been getting Xolair.    She is on Protonix for reflux.    She is on azithromycin 250 mg a day for her chronic severe COPD.    She does have home oxygen 2 to 3 L but does not often use it.  She does have a portable and home concentrator.  She was using the oxygen more often for the few days before the ER visit.  She is not using her oxygen now.    She was given prednisone 40 mg a day for 5 days in the ER.  No additional antibiotics and sent home.    She is feeling better and near back to baseline currently.  No fever.    No flare of her chronic sinusitis.    Denies orthostasis.  Still on Lasix 30 mg a day, losartan 50 mg a day and Toprol-XL 75 mg a day.    Her blood pressure was controlled even borderline low in the ER.  In February her blood pressure was 120/70.    Patient has been using occasional Aleve for her chronic severe hip pain.    She had a redo left hip replacement back in 2015 with bone graft and extensive scarring.  She is no longer on  the narcotics.  She has been taking gabapentin at night.    Past history of osteoporosis with previous 8 years of Fosamax.  Last DEXA scan March 2019.  She started on Prolia in February of this year.    Chronic dysthymia stable on Effexor X are 150 mg a day.    Past history of right lumpectomy in 2017 with radiation.  May 2020 mammogram negative.  She has an appointment later this month with oncology.    Past history of overactive bladder on Myrbetriq but no complaints of urine issues today.    No abdominal pain or blood in stool or new bleeding issues.    She does have some bruising on her arms bilaterally after the ER from blood draw.    History of occipital stroke.  No new neurologic events.  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    She is on aspirin 81 mg a day and simvastatin 40 mg.    She does have a diagnosis of heterozygous factor V Leiden mutation.    PMHx:    Past Medical History:   Diagnosis Date     Arrhythmia      Breast cancer (H) 2017     CHF (congestive heart failure) (H)      COPD (chronic obstructive pulmonary disease) (H)      Coronary artery disease      GERD (gastroesophageal reflux disease)      Hx of radiation therapy 2017     Hyperlipidemia      Hypertension      Idiopathic cardiomyopathy (H)      PSHx:    Past Surgical History:   Procedure Laterality Date     BREAST BIOPSY Right 2017     BREAST LUMPECTOMY Left 06/2017    x2     CARDIAC CATHETERIZATION       CATARACT EXTRACTION Left 07/18/2017     IA OPEN TX FEMORAL FRACTURE DISTAL MED/LAT CONDYLE Left 10/28/2015    Procedure: OPEN REDUCTION INTERNAL FIXATION LEFT  PROXIMAL FEMUR PERIPROSTHETIC FRACTURE;  Surgeon: Yovanny Albarran MD;  Location: [facility];  Service: Orthopedics     REVISION TOTAL HIP ARTHROPLASTY Left      Immunizations:   Immunization History   Administered Date(s) Administered     Influenza [id number], 01/05/2010     Influenza high dose,seasonal,PF, 65+ yrs 10/30/2014, 10/01/2015, 11/26/2016      Influenza, Seasonal, Inj PF IIV3 11/03/2003     Influenza, inj, historic,unspecified 10/01/2016, 10/01/2018     Influenza,H2M5-36,Pandemic,split,IM 01/05/2010     Influenza,seasonal, Inj IIV3 11/03/2003, 11/08/2006, 10/24/2007, 11/28/2008, 10/09/2009, 10/22/2010, 01/26/2012, 10/03/2012, 11/01/2013     Influenza,trivalent,im, 65+yrs 09/20/2017, 09/25/2018, 10/10/2019     Pneumo Conj 13-V (2010&after) 05/11/2015     Pneumo Polysac 23-V 11/24/1997, 11/21/2007, 05/17/2017     Td, adult adsorbed, PF 07/20/2004     Tdap 02/12/2016     ZOSTER, LIVE 02/18/2009, 10/20/2014       ROS A comprehensive review of systems was performed and was otherwise negative    Medications, allergies, and problem list were reviewed and updated    Exam  LMP 11/02/1992   Patient appears fairly well on video.  There is no dyspnea.  Her voice quality is good.  She is not on oxygen.  Color good.  No jaundice.  She is not using accessory muscles to breathe.  I did examine both of her arms on video with extensive bruising consistent with blood draw ecchymosis.    Assessment/Plan  1. Pulmonary emphysema, unspecified emphysema type (H)  Exacerbation, likely with the weather change and very humid air last week.  Back to baseline now.    Finished prednisone as prescribed.  No evidence of secondary infection.    Continue extensive inhalers and Xolair and daily azithromycin.      - fluticasone propion-salmeteroL (ADVAIR) 500-50 mcg/dose DISKUS; Inhale 1 puff 2 (two) times a day.  Dispense: 60 each; Refill: 4  - ipratropium (ATROVENT HFA) 17 mcg/actuation inhaler; Inhale 2 puffs every 6 (six) hours.  Dispense: 3 Inhaler; Refill: 3    History of small lung nodule on May 2020 CT scan of the chest, but not seen on the June 2020 CT scan of chest.  A six-month follow-up CT scan was recommended from May of this year.  Consider follow-up CT scan of the chest this fall.    I also encouraged her to follow-up with her pulmonary physician.    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  Possibility for volume overload causing shortness of breath.  See patient instructions about taking an extra 20 mg of Lasix and contacting clinic in the future if she does have increasing shortness of breath spell.    2. Hypercholesterolemia  Refill given.  Continue current dose.  History of stroke.  - simvastatin (ZOCOR) 40 MG tablet; Take 1 tablet (40 mg total) by mouth at bedtime.  Dispense: 90 tablet; Refill: 3    3. Cardiomyopathy, idiopathic (H)  Mixed systolic and diastolic dysfunction.  Diagnosed as a broken heart syndrome after her father  6 years ago.    Continue current furosemide/losartan and Toprol-XL.  Blood pressure usually on the low side.    Moderate elevated BNP but stable.  Could take an extra furosemide dose if needed for increasing shortness of breath in the future.    Patient was counseled to avoid Aleve or ibuprofen type NSAIDs.    Patient has her routine follow-up echo scheduled for .    4. Essential hypertension  Controlled on the low side.  Continue current medications.    5. CKD (chronic kidney disease) stage 3, GFR 30-59 ml/min (H)  Stable creatinine on  lab    6. S/P hip replacement, left  Chronic severe pain with significant scar tissue from previous extensive surgery.  No longer on narcotics I would recommend she not restart narcotics given her severe lung disease and fall risk.  She has been using trazodone, has tolerated that, however.    Tylenol has not been effective for her.  Gabapentin in the evening.    7. History of stroke  No new event.  Low-dose aspirin and simvastatin 40 mg    8. Osteoporosis, unspecified osteoporosis type, unspecified pathological fracture presence  Prolia.  Previous Fosamax for 8 years.    9. History of invasive breast cancer  Negative mammogram in May.  Follow-up with her oncologist in July.    Questionable lung opacity in May but not seen on the 2020 CT scan.    Possible  follow-up CT scan of chest in the fall.    Chronic dysthymia stable on Effexor  mg a day.    Return in 10 weeks (on 9/17/2020) for Video follow-up.   Patient Instructions   No change in medical treatment plan.  Finish the prednisone as prescribed.    Use your oxygen more often if you are having increasing shortness of breath.    If you are having increasing shortness of breath, especially if your legs are swollen or gaining weight, you could take 1 extra furosemide 20 mg tablet and contact clinic for further evaluation.    Maintain a low-salt diet and regular daily walking at your home.    You have been scheduled for a virtual video follow-up appointment on September 17 at 10:20 AM.    Try to avoid ibuprofen and Aleve use, as that can affect your kidney function and blood pressure.  It can also increase bleeding and bruising.    Pankaj Montanez MD        Current Outpatient Medications   Medication Sig Dispense Refill     acetaminophen (TYLENOL) 500 MG tablet Take 1-2 tablets (500-1,000 mg total) by mouth every 6 (six) hours as needed.  0     albuterol (PROAIR HFA;PROVENTIL HFA;VENTOLIN HFA) 90 mcg/actuation inhaler Inhale 2 puffs every 4 (four) hours as needed for wheezing. 1 Inhaler 0     ascorbic acid, vitamin C, (VITAMIN C) 1000 MG tablet Take 1,000 mg by mouth daily. Airborne 1 tab daily       aspirin 81 MG EC tablet Take 81 mg by mouth daily.       azelastine (ASTELIN) 137 mcg (0.1 %) nasal spray 2 sprays into each nostril daily.       azithromycin (ZITHROMAX) 250 MG tablet Take 250 mg by mouth daily.       benzonatate (TESSALON) 200 MG capsule Take 1 capsule (200 mg total) by mouth 3 (three) times a day. 60 capsule 2     chlorhexidine (PERIDEX) 0.12 % solution Apply 15 mL to the mouth or throat at bedtime.              cholecalciferol, vitamin D3, 1,000 unit tablet Take 1,000 Units by mouth daily.       cyanocobalamin, vitamin B-12, (VITAMIN B-12 ORAL) Take 1,000 mcg by mouth daily.               EPINEPHrine (EPIPEN/ADRENACLICK/AUVI-Q) 0.3 mg/0.3 mL injection Inject 0.3 mg into the shoulder, thigh, or buttocks.       famotidine (PEPCID) 20 MG tablet Take 1 tablet by mouth 2 (two) times a day.       fluticasone (FLONASE) 50 mcg/actuation nasal spray Apply 1 spray into each nostril as needed.        fluticasone propion-salmeteroL (ADVAIR) 500-50 mcg/dose DISKUS Inhale 1 puff 2 (two) times a day. 60 each 4     furosemide (LASIX) 20 MG tablet Take 1.5 tablets (30 mg total) by mouth daily.       gabapentin (NEURONTIN) 300 MG capsule Take 1 capsule (300 mg total) by mouth 2 (two) times a day. (Patient taking differently: Take 600 mg by mouth 2 (two) times a day. ) 180 capsule 1     ipratropium (ATROVENT HFA) 17 mcg/actuation inhaler Inhale 2 puffs every 6 (six) hours. 3 Inhaler 3     ipratropium-albuterol (DUO-NEB) 0.5-2.5 mg/3 mL nebulizer 1 neb 4 times a day until seen by primary and then as needed or as directed 60 vial 1     lidocaine (LIDODERM) 5 % Place 1 patch on the skin daily. To left hip (Patient taking differently: Place 1 patch on the skin daily as needed. To left hip) 30 patch 3     losartan (COZAAR) 50 MG tablet TAKE 1 TABLET DAILY 90 tablet 3     metoprolol succinate (TOPROL-XL) 50 MG 24 hr tablet Take 1.5 tablets (75 mg total) by mouth daily. 135 tablet 3     mirabegron (MYRBETRIQ) 50 mg Tb24 ER tablet Take 1 tablet (50 mg total) by mouth daily. 90 tablet 3     NON FORMULARY Take 1 Tablet 24 Hour Sustained Release by mouth daily. Airborne, 1 tablet orally daily       omalizumab (XOLAIR) 150 mg/mL Syrg injection Inject 300 mg under the skin every 14 (fourteen) days.        pantoprazole (PROTONIX) 40 MG tablet Take 1 tablet (40 mg total) by mouth daily. 90 tablet 4     predniSONE (DELTASONE) 20 MG tablet Take 40 mg by mouth daily for 5 days. 10 tablet 0     simvastatin (ZOCOR) 40 MG tablet Take 1 tablet (40 mg total) by mouth at bedtime. 90 tablet 3     sodium chloride (OCEAN) 0.65 % nasal spray 2  sprays into each nostril as needed.       solifenacin (VESICARE) 5 MG tablet Take 1 tablet (5 mg total) by mouth at bedtime. 90 tablet 3     tamoxifen (NOLVADEX) 20 MG tablet TAKE 1 TABLET DAILY 90 tablet 4     traMADoL (ULTRAM) 50 mg tablet Take 1 tablet (50 mg total) by mouth 2 (two) times a day. 60 tablet 1     traZODone (DESYREL) 50 MG tablet Take 1 tablet (50 mg total) by mouth at bedtime as needed. 90 tablet 1     triamcinolone (KENALOG) 0.1 % cream Apply to rash on arms twice daily for one week 30 g 0     triamcinolone (KENALOG) 0.5 % ointment Apply o foot twice a day 30 g 1     venlafaxine (EFFEXOR-XR) 150 MG 24 hr capsule TAKE 1 CAPSULE DAILY 90 capsule 4     Current Facility-Administered Medications   Medication Dose Route Frequency Provider Last Rate Last Dose     denosumab 60 mg (PROLIA 60 mg/ml)  60 mg Subcutaneous Q6 Months Suzanna Shane MD   60 mg at 20 1352     Allergies   Allergen Reactions     Sulfa (Sulfonamide Antibiotics) Rash     Headaches and dizziness.     Homeopathic Drugs Unknown and Other (See Comments)     Comment: runny nose, Comment: runny nose     Mold Extracts      Morphine (Pf)      hallucinate     Lisinopril Cough, Unknown and Itching     Comment: cough, Comment: cough     Sulfacetamide Sodium Rash     Social History     Tobacco Use     Smoking status: Former Smoker     Last attempt to quit: 10/28/2007     Years since quittin.7     Smokeless tobacco: Former User     Quit date: 2004   Substance Use Topics     Alcohol use: No     Drug use: No             Video-Visit Details    Type of service:  Video Visit    Video End Time (time video stopped): 9:37 AM  Originating Location (pt. Location): Home    Distant Location (provider location):  [facility] INTERNAL MEDICINE     Platform used for Video Visit: Mary Beth Motnanez MD

## 2021-06-10 ENCOUNTER — HOSPITAL ENCOUNTER (OUTPATIENT)
Dept: MAMMOGRAPHY | Facility: CLINIC | Age: 76
Discharge: HOME | End: 2021-06-10
Attending: INTERNAL MEDICINE
Payer: MEDICARE

## 2021-06-10 DIAGNOSIS — Z12.31 VISIT FOR SCREENING MAMMOGRAM: ICD-10-CM

## 2021-06-10 NOTE — TELEPHONE ENCOUNTER
Attempted to call to get records from [facility] but did not get through and unable to leave a VM. Will try again.

## 2021-06-10 NOTE — PROGRESS NOTES
"Irene León is a 75 y.o. female who is being evaluated via a billable video visit.      The patient has been notified of following:     \"This video visit will be conducted via a call between you and your physician/provider. We have found that certain health care needs can be provided without the need for an in-person physical exam.  This service lets us provide the care you need with a video conversation.  If a prescription is necessary we can send it directly to your pharmacy.  If lab work is needed we can place an order for that and you can then stop by our lab to have the test done at a later time.    Video visits are billed at different rates depending on your insurance coverage. Please reach out to your insurance provider with any questions.    If during the course of the call the physician/provider feels a video visit is not appropriate, you will not be charged for this service.\"    Patient has given verbal consent to a Video visit? Yes  How would you like to obtain your AVS? AVS Preference: MyChart.  If dropped by the video visit, the video invitation should be sent to: Text to cell phone: [phone]  Will anyone else be joining your video visit? No        Video Start Time: 215 PM    Patient seeks video visit consultation today with regard to some right-sided piriformis syndrome pain that she has been experiencing recently.    I reviewed her [facility] note from 7/20.  She had some x-rays done at that time of her hips which were reassuring.    She was diagnosed with piriformis syndrome and trochanteric bursitis.    She was given a Medrol Dosepak and told to follow-up with her PCP to discuss if she needed to be on prednisone long-term.    She did use her Medrol Dosepak and says that it worked wonderfully for her.    She tells me that she was also having some sciatica type pain at that point but that that seems to be less severe now.    She does have some PT stretches/exercises to do at home for her " sciatica/piriformis syndrome and has been doing those a few times per day.    Advil also works well for her in regards to her orthopedic issues but she knows that she needs to stay away from NSAIDs due to her history of renal insufficiency.    She does have a history of cardiomyopathy with recent echocardiogram revealing an EF of less than 40%.    She is on furosemide 30 mg daily.    1.  Piriformis syndrome    I told her that she would not be a good candidate for long-term prednisone use due to her history of cardiomyopathy.  We had a long discussion about the long-term risks and consequences associated with prednisone.  She voiced understanding.  I did refill her tramadol for her today, which she uses for her chronic osteoarthritis pain.  We talked about additional exercises that she could use for her piriformis syndrome and trochanteric bursitis.  It sounds like her sciatica pain continues to improve.  She will follow-up with her PCP as scheduled.        Video-Visit Details    Type of service:  Video Visit    Video End Time (time video stopped): 2:53 PM  Originating Location (pt. Location): Home    Distant Location (provider location):  [facility] FAMILY MEDICINE/OB     Platform used for Video Visit: Mary Beth Flores CNP

## 2021-06-10 NOTE — PATIENT INSTRUCTIONS - HE
Focus on your stretching and activity routine.  Avoid over stretching, but can increase the frequency of your stretching and exercises to help reduce the chronic pain.    Continue use topical ointments, and Tylenol as you are doing.    Do not take the tramadol, as that can increase your heart arrhythmia risk.    You can request a referral to the pain clinic in the future, if you wish to discuss narcotics, but narcotics can increase the chance of death.    Follow-up with video virtual visit on November 11 at 1 PM with me.

## 2021-06-10 NOTE — TELEPHONE ENCOUNTER
Per [facility], they had told the pt that if Medrol Dosepak was working, then pt can call PCP to consider a low-dose burst of Prednisone. [facility] doctor had started pt on Medrol Dosepak on 07/20 for 10 days. Will ask pt if she would like an appt to discuss this with PCP.

## 2021-06-10 NOTE — TELEPHONE ENCOUNTER
Refill Approved    Rx renewed per Medication Renewal Policy. Medication was last renewed on 9/16/2019.    Krys Minor, [facility] Connection Triage/Med Refill 7/30/2020     Requested Prescriptions   Pending Prescriptions Disp Refills     traZODone (DESYREL) 50 MG tablet [Pharmacy Med Name: TRAZODONE 50MG TABLETS] 30 tablet 0     Sig: TAKE 1 TABLET(50 MG) BY MOUTH AT BEDTIME AS NEEDED       Tricyclics/Misc Antidepressant/Antianxiety Meds Refill Protocol Passed - 7/30/2020  2:26 PM        Passed - PCP or prescribing provider visit in last year     Last office visit with prescriber/PCP: 3/8/2019 Collette Torres NP OR same dept: Visit date not found OR same specialty: 3/8/2019 Collette Torres NP  Last physical: 2/21/2019 Last MTM visit: Visit date not found   Next visit within 3 mo: Visit date not found  Next physical within 3 mo: Visit date not found  Prescriber OR PCP: Collette Torres NP  Last diagnosis associated with med order: 1. Primary insomnia  - traZODone (DESYREL) 50 MG tablet [Pharmacy Med Name: TRAZODONE 50MG TABLETS]; TAKE 1 TABLET(50 MG) BY MOUTH AT BEDTIME AS NEEDED  Dispense: 30 tablet; Refill: 0    If protocol passes may refill for 12 months if within 3 months of last provider visit (or a total of 15 months).

## 2021-06-10 NOTE — TELEPHONE ENCOUNTER
Who is calling:  Patient  Reason for Call:  Pt states she needs a prescription written for Shingles vaccine to be sent to her [facility]'s pharmacy on file.  Please advise.  Date of last appointment with primary care: N/A  Okay to leave a detailed message: No

## 2021-06-10 NOTE — PROGRESS NOTES
"Irene León is a 75 y.o. female who is being evaluated via a billable video visit.      The patient has been notified of following:     \"This video visit will be conducted via a call between you and your physician/provider. We have found that certain health care needs can be provided without the need for an in-person physical exam.  This service lets us provide the care you need with a video conversation.  If a prescription is necessary we can send it directly to your pharmacy.  If lab work is needed we can place an order for that and you can then stop by our lab to have the test done at a later time.    Video visits are billed at different rates depending on your insurance coverage. Please reach out to your insurance provider with any questions.    If during the course of the call the physician/provider feels a video visit is not appropriate, you will not be charged for this service.\"    Patient has given verbal consent to a Video visit? Yes  How would you like to obtain your AVS? AVS Preference: MyChart.  If dropped by the video visit, the video invitation should be sent to: Text to cell phone: [phone]   Will anyone else be joining your video visit? No        Video Start Time: 9:46 AM    Additional provider notes:     [facility] Follow Up Note    Irene León   75 y.o. female    Date of Visit: 8/24/2020    Chief Complaint   Patient presents with     Follow-up     Back pain follow-up. Requesting refills including 90-day supply of Trazodone which has improved pain.     Subjective  Stephanie requested a video virtual visit to avoid clinic visit during the coronavirus outbreak.    Patient wanted to discuss her pain medication for chronic hip pain.  In 2015 she had a redo hip replacement with severe scar tissue and bone graft.  She has had chronic pain since.    She does state that she is doing her daily exercises for about 20 minutes.  If she sits still for longer periods of time she does get " stiff and achy.    She is not using Aleve.    She has a past history of chronic renal insufficiency.  I did review labs from July with a creatinine stable at 1.48.    She was given tramadol which did help reduce her pain.    Patient has a past history of severe COPD on home oxygen.  She takes daily azithromycin as well as Advair and Xolair.  Her pulmonary disease is severe but stable.    She has cardiomyopathy, felt to be a broken heart syndrome.    I did review the heart echo from July 2020 with an ejection fraction of 30%, no aortic stenosis.  Diastolic dysfunction noted previously.  No heart valve issues.    She still on furosemide 30 mg a day, losartan 50 mg a day and Toprol-XL 75 mg a day.    No history of palpitations or V. tach.  No chest pain.    Patient did have a right upper lobe lung nodule 6 x 4 mm May 2020 on chest CT scan.  June 2020 chest CT scan for PE run did not note the nodules.    No change in cough or new cough.    Past history of occipital stroke.  Still on low-dose aspirin and simvastatin 40 mg.    She is on tamoxifen for history of breast cancer with invasive ductal breast cancer with right lumpectomy and radiation in 2017.  I did review the mammogram from May 2020 which was negative.  I did review the oncology note from July, no new findings and given a 6-month follow-up.    She does have a heterozygous factor V Leiden mutation history.    No history of DVT or pulmonary embolism.    PMHx:    Past Medical History:   Diagnosis Date     Arrhythmia      Breast cancer (H) 2017     CHF (congestive heart failure) (H)      COPD (chronic obstructive pulmonary disease) (H)      Coronary artery disease      GERD (gastroesophageal reflux disease)      Hx of radiation therapy 2017     Hyperlipidemia      Hypertension      Idiopathic cardiomyopathy (H)      PSHx:    Past Surgical History:   Procedure Laterality Date     BREAST BIOPSY Right 2017     BREAST LUMPECTOMY Left 06/2017    x2     CARDIAC  CATHETERIZATION       CATARACT EXTRACTION Left 07/18/2017     GA OPEN TX FEMORAL FRACTURE DISTAL MED/LAT CONDYLE Left 10/28/2015    Procedure: OPEN REDUCTION INTERNAL FIXATION LEFT  PROXIMAL FEMUR PERIPROSTHETIC FRACTURE;  Surgeon: Yovanny Albarran MD;  Location: [facility];  Service: Orthopedics     REVISION TOTAL HIP ARTHROPLASTY Left      Immunizations:   Immunization History   Administered Date(s) Administered     Influenza [id number], 01/05/2010     Influenza high dose,seasonal,PF, 65+ yrs 10/30/2014, 10/01/2015, 11/26/2016     Influenza, Seasonal, Inj PF IIV3 11/03/2003     Influenza, inj, historic,unspecified 10/01/2016, 10/01/2018     Influenza,J3G0-20,Pandemic,split,IM 01/05/2010     Influenza,seasonal, Inj IIV3 11/03/2003, 11/08/2006, 10/24/2007, 11/28/2008, 10/09/2009, 10/22/2010, 01/26/2012, 10/03/2012, 11/01/2013     Influenza,trivalent,im, 65+yrs 09/20/2017, 09/25/2018, 10/10/2019     Pneumo Conj 13-V (2010&after) 05/11/2015     Pneumo Polysac 23-V 11/24/1997, 11/21/2007, 05/17/2017     Td, adult adsorbed, PF 07/20/2004     Tdap 02/12/2016     ZOSTER, LIVE 02/18/2009, 10/20/2014       ROS A comprehensive review of systems was performed and was otherwise negative    Medications, allergies, and problem list were reviewed and updated    Exam  LMP 11/02/1992   Alert and oriented on video visit.  No jaundice.  Was not dyspneic, mentation baseline.    Assessment/Plan  1. Hip pain, left  Chronic hip pain with history of redo replacement with bone graft and significant scar tissue.    Chronic pain with stiffness.  She does do her daily exercises.  I stressed the importance of the daily exercises and stretching, but without overstretching.    I encouraged her to do the stretching on a more regular basis, possibly twice or 3 times a day.  She declined another referral to physical therapy but that was offered.    I did recommend that she avoid narcotics and tramadol.  I specifically discussed tramadol risk  with her azithromycin, Advair, trazodone and venlafaxine.  I did explain to her that all these medications can combine to increase her risk of sudden death and ventricular arrhythmia.    Patient did not feel that the risk of tramadol outweighed the benefit.  She will not use tramadol in the future.    I did discuss option for referral to the pain clinic to discuss chronic narcotics, but I also discussed risk of chronic narcotics including respiratory depression and increased chance of death.    Tylenol as needed.  Topical ointments.    Avoid NSAIDs with her chronic renal insufficiency.    2. Pulmonary emphysema, unspecified emphysema type (H)  Severe but stable.  Daily azithromycin, Advair and Xolair.  Home oxygen.    3. Cardiomyopathy, idiopathic (H)  Stable on echo in July.  Continue Lasix, losartan and Toprol-XL.    She declined a clinic visit this fall and wanted to change her appointment to November 11.    4. Cough  Chronic.  With COPD.  - benzonatate (TESSALON) 200 MG capsule; Take 1 capsule (200 mg total) by mouth 3 (three) times a day as needed for cough.  Dispense: 90 capsule; Refill: 2    5. Postherpetic neuralgia  Chronic pain.  - gabapentin (NEURONTIN) 300 MG capsule; Take 2 capsules (600 mg total) by mouth 2 (two) times a day.  Dispense: 360 capsule; Refill: 2    6. Primary insomnia  I did warn patient about heart arrhythmia risk.  She does except heart arrhythmia risk with the use of trazodone and venlafaxine.  - traZODone (DESYREL) 50 MG tablet; TAKE 1 TABLET(50 MG) BY MOUTH AT BEDTIME AS NEEDED  Dispense: 90 tablet; Refill: 2    7. Heartburn  Controlled  - famotidine (PEPCID) 20 MG tablet; Take 1 tablet (20 mg total) by mouth 2 (two) times a day.  Dispense: 180 tablet; Refill: 3    8. CKD (chronic kidney disease) stage 3, GFR 30-59 ml/min (H)  Stable on check in July.    History of breast cancer.  No evidence of recurrence at this time.  Yearly mammogram in May.  Follow-up with oncology early next  year for 6-month follow-up.  Still on tamoxifen.    Getting Prolia for osteoporosis.  Previous Fosamax.    Chronic dysthymia she reports is stable and will continue on venlafaxine.    History of overactive bladder on Myrbetriq    History of occipital stroke.  Cholesterol well controlled in 2018.  Continue simvastatin and low-dose aspirin.    Return in 3 months (on 11/11/2020) for Video follow-up at 1 PM.   Patient Instructions   Focus on your stretching and activity routine.  Avoid over stretching, but can increase the frequency of your stretching and exercises to help reduce the chronic pain.    Continue use topical ointments, and Tylenol as you are doing.    Do not take the tramadol, as that can increase your heart arrhythmia risk.    You can request a referral to the pain clinic in the future, if you wish to discuss narcotics, but narcotics can increase the chance of death.    Follow-up with video virtual visit on November 11 at 1 PM with me.    Pankaj Montanez MD        Current Outpatient Medications   Medication Sig Dispense Refill     acetaminophen (TYLENOL) 500 MG tablet Take 1-2 tablets (500-1,000 mg total) by mouth every 6 (six) hours as needed.  0     albuterol (PROAIR HFA;PROVENTIL HFA;VENTOLIN HFA) 90 mcg/actuation inhaler Inhale 2 puffs every 4 (four) hours as needed for wheezing. 1 Inhaler 0     ascorbic acid, vitamin C, (VITAMIN C) 1000 MG tablet Take 1,000 mg by mouth daily. Airborne 1 tab daily       aspirin 81 MG EC tablet Take 81 mg by mouth daily.       azelastine (ASTELIN) 137 mcg (0.1 %) nasal spray 2 sprays into each nostril daily.       azithromycin (ZITHROMAX) 250 MG tablet Take 250 mg by mouth daily.       benzonatate (TESSALON) 200 MG capsule Take 1 capsule (200 mg total) by mouth 3 (three) times a day as needed for cough. 90 capsule 2     chlorhexidine (PERIDEX) 0.12 % solution Apply 15 mL to the mouth or throat at bedtime as needed.        cholecalciferol, vitamin D3, 1,000 unit tablet  Take 1,000 Units by mouth daily.       EPINEPHrine (EPIPEN/ADRENACLICK/AUVI-Q) 0.3 mg/0.3 mL injection Inject 0.3 mg into the shoulder, thigh, or buttocks.       famotidine (PEPCID) 20 MG tablet Take 1 tablet (20 mg total) by mouth 2 (two) times a day. 180 tablet 3     fluticasone (FLONASE) 50 mcg/actuation nasal spray Apply 1 spray into each nostril as needed.        fluticasone propion-salmeteroL (ADVAIR) 500-50 mcg/dose DISKUS Inhale 1 puff 2 (two) times a day. 60 each 4     furosemide (LASIX) 20 MG tablet Take 1.5 tablets (30 mg total) by mouth daily.       gabapentin (NEURONTIN) 300 MG capsule Take 2 capsules (600 mg total) by mouth 2 (two) times a day. 360 capsule 2     ipratropium (ATROVENT HFA) 17 mcg/actuation inhaler Inhale 2 puffs every 6 (six) hours. 3 Inhaler 3     ipratropium-albuterol (DUO-NEB) 0.5-2.5 mg/3 mL nebulizer 1 neb 4 times a day until seen by primary and then as needed or as directed 60 vial 1     losartan (COZAAR) 50 MG tablet TAKE 1 TABLET DAILY 90 tablet 3     metoprolol succinate (TOPROL-XL) 50 MG 24 hr tablet Take 1.5 tablets (75 mg total) by mouth daily. 135 tablet 3     mirabegron (MYRBETRIQ) 50 mg Tb24 ER tablet Take 1 tablet (50 mg total) by mouth daily. 90 tablet 3     NON FORMULARY Take 1 Tablet 24 Hour Sustained Release by mouth daily. Airborne, 1 tablet orally daily       omalizumab (XOLAIR) 150 mg/mL Syrg injection Inject 300 mg under the skin every 14 (fourteen) days.        pantoprazole (PROTONIX) 40 MG tablet Take 1 tablet (40 mg total) by mouth daily. 90 tablet 4     simvastatin (ZOCOR) 40 MG tablet Take 1 tablet (40 mg total) by mouth at bedtime. 90 tablet 3     sodium chloride (OCEAN) 0.65 % nasal spray 2 sprays into each nostril as needed.       solifenacin (VESICARE) 5 MG tablet Take 1 tablet (5 mg total) by mouth at bedtime. 90 tablet 3     tamoxifen (NOLVADEX) 20 MG tablet TAKE 1 TABLET DAILY 90 tablet 4     traMADoL (ULTRAM) 50 mg tablet Take 1 tablet (50 mg total)  by mouth 2 (two) times a day. 60 tablet 1     traZODone (DESYREL) 50 MG tablet TAKE 1 TABLET(50 MG) BY MOUTH AT BEDTIME AS NEEDED 90 tablet 2     triamcinolone (KENALOG) 0.1 % cream Apply to rash on arms twice daily for one week 30 g 0     triamcinolone (KENALOG) 0.5 % ointment Apply o foot twice a day 30 g 1     venlafaxine (EFFEXOR-XR) 150 MG 24 hr capsule TAKE 1 CAPSULE DAILY 90 capsule 4     Current Facility-Administered Medications   Medication Dose Route Frequency Provider Last Rate Last Dose     denosumab 60 mg (PROLIA 60 mg/ml)  60 mg Subcutaneous Q6 Months Suzanna Shane MD   60 mg at 20 1352     Allergies   Allergen Reactions     Sulfa (Sulfonamide Antibiotics) Rash     Headaches and dizziness.     Homeopathic Drugs Unknown and Other (See Comments)     Comment: runny nose, Comment: runny nose     Mold Extracts      Morphine (Pf)      hallucinate     Lisinopril Cough, Unknown and Itching     Comment: cough, Comment: cough     Sulfacetamide Sodium Rash     Social History     Tobacco Use     Smoking status: Former Smoker     Last attempt to quit: 10/28/2007     Years since quittin.8     Smokeless tobacco: Former User     Quit date: 2004   Substance Use Topics     Alcohol use: No     Drug use: No           Video-Visit Details    Type of service:  Video Visit    Video End Time (time video stopped): 9:57 AM  Originating Location (pt. Location): Home    Distant Location (provider location):  [facility]     Platform used for Video Visit: Mary Beth Montanez MD

## 2021-06-10 NOTE — TELEPHONE ENCOUNTER
Medication Request  Medication name: prednisone  Requested Pharmacy: [facility]  Reason for request: Patient states her Orthopedic doctor from [facility] told her that they want her to be on prednisone.  States the ortho doctor was going to send the provider a note regarding this.  Please advise.    Okay to leave a detailed message: yes

## 2021-06-12 NOTE — TELEPHONE ENCOUNTER
[facility] Nurse Triage Telephone Note      Provider: Paola Rose MD  Facility: [facility] Type: TCU    Caller: Pat  Call Back Number:  [phone]    Allergies: Sulfa (sulfonamide antibiotics), Homeopathic drugs, Mold extracts, Morphine (pf), Lisinopril, and Sulfacetamide sodium    Reason for call: Nurse reporting BMP results.  Notable meds:  Metoprolol ER 75mg daily, Xarelto 20mg daily.       Verbal Order/Direction given by Provider: Check BMP on 11/6/20.      Provider giving order: Paola Rose MD    Verbal order given to: Boston Gabriel, RN

## 2021-06-12 NOTE — PROGRESS NOTES
RADIATION ONCOLOGY WEEKLY TREATMENT VISIT NOTE      Assessment / Impression       1. Malignant neoplasm of central portion of left female breast       Malignant neoplasm of central portion of left female breast    Staging form: Breast, AJCC 7th Edition    - Pathologic stage from 7/26/2017: Stage IA (T1b, N0, cM0) - Signed by Devon Suarez MD on 8/1/2017   Tolerating radiation therapy well.  All questions and concerns addressed.      Plan:     Continue radiation treatment as prescribed.  Radiation: Site: Left Breast  Stereotactic Radiosurgery: No  Stereotactic Radiosurgery: No  Concurrent Therapy: No  Today's Dose: 1330  Total Dose for Breast: 4256  Today's Fraction/Total Fraction Breast: 5/16          Subjective:      HPI: Irene León is a 72 y.o. female with    1. Malignant neoplasm of central portion of left female breast         The following portions of the patient's history were reviewed and updated as appropriate: allergies, current medications, past family history, past medical history, past social history, past surgical history and problem list.      Objective:     Exam:     Vitals:    08/23/17 0919   BP: 136/85   Pulse: (!) 56   Temp: 97.6  F (36.4  C)   TempSrc: Oral   SpO2: 97%   Weight: 151 lb 6.4 oz (68.7 kg)   Height: 5' (1.524 m)       Wt Readings from Last 8 Encounters:   08/23/17 151 lb 6.4 oz (68.7 kg)   08/01/17 153 lb (69.4 kg)   07/27/17 153 lb (69.4 kg)   06/06/17 155 lb (70.3 kg)   03/01/17 152 lb (68.9 kg)   02/28/17 152 lb (68.9 kg)   12/29/16 152 lb (68.9 kg)   08/30/16 152 lb (68.9 kg)       General: Alert and oriented, in no acute distress  Irene has mild Erythema.    Treatment Summary to Date    [facility] chart and setup information reviewed    Andra Gonzales MD

## 2021-06-12 NOTE — PROGRESS NOTES
[facility] Admission note      Patient: Irene León  MRN: [id number]  Date of Service: 11/9/2020      [facility] [[id number]]  Reason for Visit     Chief Complaint   Patient presents with     Problem Visit   F/U PAIN /MUSCLE SPASMS/ chf    Code Status     FULL CODE    Assessment     -Acute CHF exacerbation with persistent weight gain in spite of being on a higher dose of Lasix  -Ongoing concerns about cough and congestion with hypoxic respiratory failure  -T12 compression fracture  -ANATOLIY with elevated creatinine 1.4 in the TCU  -Osteoporosis restarted on Prolia  -Pain management no longer on narcotics; no discharge on narcotics including Percocet  -Acute metabolic encephalopathy/delirium felt to be secondary to opioid medication  -CHF with reduced ejection fraction acute exacerbation  -- ANATOLIY /cardiorenal syndrome  -History of COPD and asthma  -Acute on chronic hypoxic respiratory failure ON 3L BY NC  -Peripheral arterial disease  -History of factor V Leiden and occipital stroke on Xarelto  -History of breast cancer  -Concern for mild dementia with baseline cognitive impairment  Slums 13/30  -Generalized weakness      Plan     Patient has been admitted to the TCU for strengthening and rehab  Patient followed up for her CHF exacerbation.  Did not respond to Lasix  She is now on Bumex with improvement noted weights are down 3 pounds.  Overall reporting an improvement in her breathing less coughing and less congestion and feels better.  Pain management reviewed with her.  Refusing narcotic taper.  As per the patient pain is worse which is limiting her ability to participate walking was down 10 feet.  She is following up with orthopedics tomorrow we will await the recommendations  Recheck labs done on 11 /4 shows persistent creatinine elevation of 1.4 we will recheck BMP again this week  Her prior BMP also had a creatinine of 1.4 so may be baseline  she  has low blood pressure  today  Monitor her pressure trends  before adjusting medications    History     Patient is a very pleasant 75 y.o. female who is admitted to TCU.  Seen today again because of worsening CHF symptoms with progressive weight gain   He did not respond to Lasix or switch to Bumex with improvement noted she is reporting actually that her breathing is better.  She is coughing less.  Weights are down -147LB TODAY  SHE HAS  acute low back pain.  She was admitted in the hospital.  Imaging revealed acute/subacute inferior endplate compression fracture of T12 with 50% loss of height.  Orthopedics was consulted.  She has been discharged weightbearing as tolerated with a TLSO brace to be worn all day  Outpatient follow-up for consideration of kyphoplasty recommended in 2 to 3 weeks.  Has been given to her for follow-up with orthopedics in addition she has developed piriformis syndrome on the right side and and the past that responded by injections.  He has been asking for narcotic withdrawal.  Unfortunately patient reported that her pain actually has worsened today.  She is reporting that she is having more pain and has difficulty walking she is actually walk less.  She was walking 200 feet but today walked only 10 feet.  I am not sure this is accurate.  However she has been complaining the same thing to the nursing also.  We will monitor and await orthopedic recommendation from tomorrow  Due to ongoing cough and congestion issue stat Covid testing was done which has come back negative.  She was tested prior on 10/27/2020 also and was negative.  She remains afebrile.  Pain remains her biggest issue.  Mood and behaviors have been stable      Past Medical History     Active Ambulatory (Non-Hospital) Problems    Diagnosis     Pain due to fracture     High risk medication use     Heart failure exacerbated by sotalol (H)     T12 compression fracture (H)     Acute on chronic diastolic congestive heart failure (H)     Metabolic  encephalopathy     Low back pain     Compression fracture of L1 vertebra, sequela     Chronic respiratory failure with hypoxia (H)     Chronic obstructive pulmonary disease, unspecified COPD type (H)     Chronic systolic congestive heart failure (H)     Hypoxia     S/P hip replacement, left     Hip pain, left     Acute hypoxemic respiratory failure (H)     Chest pain     COPD exacerbation (H)     Herpes zoster without complication     Chest wall pain     Benign essential hypertension     Dyslipidemia     Acute respiratory failure with hypoxia (H)     Pulsatile tinnitus of both ears     Hot flashes due to tamoxifen     Pleural nodules     Hot flashes, menopausal     Malignant neoplasm of central portion of left female breast (H)     CKD (chronic kidney disease) stage 3, GFR 30-59 ml/min     Dyspnea     Insomnia     Leg pain, left     Femur fracture (H)     GERD (gastroesophageal reflux disease)     Post menopausal syndrome     OP (osteoporosis)     Hypertension     Pulmonary emphysema, unspecified emphysema type (H)     Hyperlipidemia     Centrilobular emphysema (H)     Anisocoria     OAB (overactive bladder)     Factor 5 Leiden mutation, heterozygous (H)     Family history of blood disease     Bladder incontinence     Physical deconditioning     Cardiomyopathy, idiopathic (H)     Depression with anxiety     Osteoarthritis of hip     Herniated intervertebral disc     Past Medical History:   Diagnosis Date     Arrhythmia      Breast cancer (H) 2017     CHF (congestive heart failure) (H)      COPD (chronic obstructive pulmonary disease) (H)      Coronary artery disease      GERD (gastroesophageal reflux disease)      Hx of radiation therapy 2017     Hyperlipidemia      Hypertension      Idiopathic cardiomyopathy (H)        Past Social History     Reviewed, and she  reports that she quit smoking about 13 years ago. She quit smokeless tobacco use about 16 years ago. She reports that she does not drink alcohol or use  drugs.    Family History     Reviewed, and family history includes Breast cancer in her maternal aunt; Osteoporosis in an other family member; Prostate cancer in her brother and maternal uncle.    Medication List   Post Discharge Medication Reconciliation Status: discharge medications reconciled, continue medications without change   Current Outpatient Medications on File Prior to Visit   Medication Sig Dispense Refill     acetaminophen (TYLENOL) 325 MG tablet Take 2 tablets (650 mg total) by mouth 3 (three) times a day. (Patient taking differently: Take 650 mg by mouth 4 (four) times a day. )  0     ADVAIR DISKUS 500-50 mcg/dose DISKUS USE 1 INHALATION TWICE A DAY 60 each 11     albuterol (PROAIR HFA;PROVENTIL HFA;VENTOLIN HFA) 90 mcg/actuation inhaler Inhale 2 puffs every 4 (four) hours as needed for wheezing. 1 Inhaler 0     ascorbic acid, vitamin C, (VITAMIN C) 1000 MG tablet Take 1,000 mg by mouth daily. Airborne 1 tab daily       aspirin 81 MG EC tablet Take 81 mg by mouth daily.       azelastine (ASTELIN) 137 mcg (0.1 %) nasal spray 2 sprays into each nostril daily.       benzonatate (TESSALON) 200 MG capsule Take 1 capsule (200 mg total) by mouth 3 (three) times a day as needed for cough. 90 capsule 2     [] calcitonin, salmon, (MIACALCIN) 200 unit/actuation nasal spray Apply 1 spray into alternating nostrils daily for 14 days.  0     celecoxib (CELEBREX) 200 MG capsule Take 200 mg by mouth 2 (two) times a day.       chlorhexidine (PERIDEX) 0.12 % solution Apply 15 mL to the mouth or throat at bedtime as needed.        cholecalciferol, vitamin D3, 1,000 unit tablet Take 1,000 Units by mouth daily.       diclofenac sodium (VOLTAREN) 1 % Gel Apply 4 g to area of pain in low back 4 times daily  0     EPINEPHrine (EPIPEN/ADRENACLICK/AUVI-Q) 0.3 mg/0.3 mL injection Inject 0.3 mg into the shoulder, thigh, or buttocks.       famotidine (PEPCID) 20 MG tablet Take 20 mg by mouth daily.       fluticasone  (FLONASE) 50 mcg/actuation nasal spray Apply 1 spray into each nostril as needed.        furosemide (LASIX) 40 MG tablet Take 1 tablet (40 mg total) by mouth 2 (two) times a day at 9am and 6pm.  0     gabapentin (NEURONTIN) 300 MG capsule Take 2 capsules (600 mg total) by mouth 2 (two) times a day. 360 capsule 2     HYDROcodone-acetaminophen 5-325 mg per tablet Take 1 tablet by mouth every 4 (four) hours as needed. 90 tablet 0     ipratropium (ATROVENT HFA) 17 mcg/actuation inhaler Inhale 2 puffs every 6 (six) hours. 3 Inhaler 3     ipratropium-albuterol (DUO-NEB) 0.5-2.5 mg/3 mL nebulizer 1 neb 4 times a day until seen by primary and then as needed or as directed 60 vial 1     lidocaine (XYLOCAINE) 5 % ointment Apply to area of pain in low back 4 times daily 35.44 g 0     losartan (COZAAR) 50 MG tablet TAKE 1 TABLET DAILY 90 tablet 3     melatonin 3 mg Tab tablet Take 1 tablet (3 mg total) by mouth at bedtime.  0     metoprolol succinate (TOPROL-XL) 50 MG 24 hr tablet Take 1.5 tablets (75 mg total) by mouth daily. 135 tablet 3     mirabegron (MYRBETRIQ) 50 mg Tb24 ER tablet Take 1 tablet (50 mg total) by mouth daily. 90 tablet 3     omalizumab (XOLAIR) 150 mg/mL Syrg injection Inject 300 mg under the skin every 14 (fourteen) days.        pantoprazole (PROTONIX) 40 MG tablet Take 1 tablet (40 mg total) by mouth daily. 90 tablet 4     polyethylene glycol (MIRALAX) 17 gram packet Take 1 packet (17 g total) by mouth daily as needed.  0     QUEtiapine (SEROQUEL) 25 MG tablet Take 0.5 tablets (12.5 mg total) by mouth 3 (three) times a day as needed (Agitation that is not redirectable.).  0     rivaroxaban ANTICOAGULANT (XARELTO) 20 mg tablet Take 20 mg by mouth at bedtime.       senna-docusate (PERICOLACE) 8.6-50 mg tablet Take 1 tablet by mouth daily as needed for constipation.  0     simvastatin (ZOCOR) 40 MG tablet Take 1 tablet (40 mg total) by mouth at bedtime. 90 tablet 3     sodium chloride (OCEAN) 0.65 % nasal  spray 2 sprays into each nostril as needed.       solifenacin (VESICARE) 5 MG tablet Take 1 tablet (5 mg total) by mouth at bedtime. 90 tablet 3     tamoxifen (NOLVADEX) 20 MG tablet TAKE 1 TABLET DAILY 90 tablet 4     traZODone (DESYREL) 50 MG tablet TAKE 1 TABLET(50 MG) BY MOUTH AT BEDTIME AS NEEDED 90 tablet 2     triamcinolone (KENALOG) 0.1 % cream Apply to rash on arms twice daily for one week 30 g 0     triamcinolone (KENALOG) 0.5 % ointment Apply o foot twice a day 30 g 1     venlafaxine (EFFEXOR-XR) 150 MG 24 hr capsule TAKE 1 CAPSULE DAILY 90 capsule 4     Current Facility-Administered Medications on File Prior to Visit   Medication Dose Route Frequency Provider Last Rate Last Dose     denosumab 60 mg (PROLIA 60 mg/ml)  60 mg Subcutaneous Q6 Months Suzanna Shane MD   60 mg at 02/04/20 1352       Allergies     Allergies   Allergen Reactions     Sulfa (Sulfonamide Antibiotics) Rash     Headaches and dizziness.     Homeopathic Drugs Unknown and Other (See Comments)     Comment: runny nose, Comment: runny nose     Mold Extracts      Morphine (Pf)      hallucinate     Lisinopril Cough, Unknown and Itching     Comment: cough, Comment: cough     Sulfacetamide Sodium Rash       Review of Systems   A comprehensive review of 14 systems was done. Pertinent findings noted here and in history of present illness. All the rest negative.  Constitutional: Negative.  Negative for fever, chills, she has activity change, appetite change and fatigue.   HENT: Negative for congestion and facial swelling.    Eyes: Negative for photophobia, redness and visual disturbance.   Respiratory: Negative for cough and chest tightness.    Cardiovascular: Negative for chest pain, palpitations and leg swelling.   Gastrointestinal: Negative for nausea, diarrhea, constipation, blood in stool and abdominal distention.   Genitourinary: Negative.    Musculoskeletal: Negative.  Pain in her low back; today reporting intractable muscle spasms which  are not improving  Has piriformis pain  Skin: Negative.    Neurological: Negative for dizziness, tremors, syncope, weakness, light-headedness and headaches.   Hematological: Does not bruise/bleed easily.   Psychiatric/Behavioral: Negative.  Significant distress due to pain      Physical Exam     Recent Vitals 10/24/2020   Height -   Weight -   BSA (m2) -   /88   Pulse 85   Temp 97.5   Temp src 1   SpO2 92   Some recent data might be hidden     Currently on 3 L of oxygen by nasal cannula wt 147lb  R/C BP 94/58  Constitutional: Oriented to person, place, and time and appears well-developed.   HEENT:  Normocephalic and atraumatic.  Eyes: Conjunctivae and EOM are normal. Pupils are equal, round, and reactive to light. No discharge.  No scleral icterus. Nose normal. Mouth/Throat: Oropharynx is clear and moist. No oropharyngeal exudate.    NECK: Normal range of motion. Neck supple. No JVD present. No tracheal deviation present. No thyromegaly present.   CARDIOVASCULAR: Normal rate, regular rhythm and intact distal pulses.  Exam reveals no gallop and no friction rub.  Systolic murmur present.  PULMONARY: Effort normal and breath sounds normal. No respiratory distress.No Wheezing or rales.  Course breath sounds heard bilaterally  Wet cough noted  ABDOMEN: Soft. Bowel sounds are normal. No distension and no mass.  There is no tenderness. There is no rebound and no guarding. No HSM.  MUSCULOSKELETAL: Normal range of motion. No edema and no tenderness. Mild kyphosis,  tenderness  to palpation on her lower thoracic spine  Mobility is limited.  LYMPH NODES: Has no cervical, supraclavicular, axillary and groin adenopathy.   NEUROLOGICAL: Alert and oriented to person, place, and time. No cranial nerve deficit.  Normal muscle tone. Coordination normal.   GENITOURINARY: Deferred exam.  SKIN: Skin is warm and dry. No rash noted. No erythema. No pallor.   EXTREMITIES: No cyanosis, no clubbing, no edema. No  Deformity.  PSYCHIATRIC: Normal mood, affect and behavior.  High anxiety due to pain      Lab Results     Recent Results (from the past 240 hour(s))   COVID-19 Virus PCR MRF    Collection Time: 11/03/20  8:00 AM    Specimen: Nasopharyngeal Swab; Respiratory   Result Value Ref Range    COVID-19 VIRUS SPECIMEN SOURCE Nasopharyngeal     2019-nCOV Not Detected    Basic Metabolic Panel    Collection Time: 11/04/20  7:02 AM   Result Value Ref Range    Sodium 140 136 - 145 mmol/L    Potassium 4.0 3.5 - 5.0 mmol/L    Chloride 105 98 - 107 mmol/L    CO2 26 22 - 31 mmol/L    Anion Gap, Calculation 9 5 - 18 mmol/L    Glucose 77 70 - 125 mg/dL    Calcium 9.1 8.5 - 10.5 mg/dL    BUN 34 (H) 8 - 28 mg/dL    Creatinine 1.46 (H) 0.60 - 1.10 mg/dL    GFR MDRD Af Amer 42 (L) >60 mL/min/1.73m2    GFR MDRD Non Af Amer 35 (L) >60 mL/min/1.73m2                  VASHTI Sanchez

## 2021-06-12 NOTE — PROGRESS NOTES
"S: Pt seen 10/18/20 at 0830 at the [facility], room 372 for a TLSO. Pt expecting consult.     O: 74 y/o, 5'2\", 156 lb pt sitting in bedside chair; awake and oriented.     A: Acute/subacute T12 compression fracture     P: Fit/delivery of small [facility] TLSO w/ pt sitting. Pt evaluated sitting and standing. Goals, ROM restrictions reviewed and demonstrated. Pt was provided clear written and oral instruction related to donning, doffing, use, maintenance, safety and potential hazards; verbalizes understanding and satisfaction with fit, comfort, and function and is independent w/ fitting and adjustment. F/U prn    MARIA LUISA Escalante    "

## 2021-06-12 NOTE — PROGRESS NOTES
[facility] Hematology and Oncology Progress Note    Patient: Irene León  MRN: [id number]  Date of Service: 09/12/2017        Reason for Visit    Chief Complaint   Patient presents with     HE Cancer     Breast Cancer       Assessment and Plan  Malignant neoplasm of central portion of left female breast    Staging form: Breast, AJCC 7th Edition    - Pathologic stage from 7/26/2017: Stage IA (T1b, N0, cM0) - Signed by Devon Suarez MD on 8/1/2017    ECOG Performance         Distress Assessment  Distress Assessment Score: 6    Pain  Currently in Pain: Yes  Pain Score (Initial OR Reassessment): 7  Location: L hip    # Stage IA (pT1b, pN0 (sn),cM0) invasive ductal carcinoma of the left breast, grade 1, associated DCIS, ER/FL positive, HER-2 negative, right breast atypical ductal hyperplasia.  S/P right breast lumpectomy, left breast lumpectomy and left axillary sentinel lymph node biopsy on 6/20/2017.  She has several comorbidities including nonischemic cardiomyopathy (EF 40% in 3/17), osteoporosis on treatment with oral bisphosphonate, factor V Leiden mutation (details unknown).     I reviewed the Oncotype results with her in detail.  I reviewed that this could be interpreted as the risks of breast cancer recurrence is estimated to be about 10% with the potential decreasing the risk of recurrence to 5% with 5 year of tamoxifen.  She clearly does not need adjuvant chemotherapy.  I reviewed the adjuvant endocrine therapy with tamoxifen for 5 years.  Aromatase inhibitor will be not ideal for her due to her underlying cardiomyopathy and osteoporosis.  I reviewed the side effects of tamoxifen in detail and written information given today.     We would hold off starting on tamoxifen while she is recovering from side effects of radiation.  She is going to have a left hip surgery which we will need to hold tamoxifen around that time.  Therefore I suggested her to follow-up with me after she finishes her surgery and  recovers from the surgery aspect (about a couple weeks after).  We could consider checking factor V Leiden status, but there is no clear recommendation on DVT prophylaxis.  She is on aspirin 81 mg that would be sufficient for her to continue with tamoxifen.  She will be closely monitor for side effects of tamoxifen once we start her on that.    Plan:  -Follow-up with me a few weeks after completion of hip surgery and recovery.    Problem List    1. Malignant neoplasm of central portion of left female breast        ______________________________________________________________________________    Diagnosis  6/20/17  Stage IA (pT1b, pN0(sn), cM0) invasive ductal carcinoma of the left breast  - ER (high positive, 98%), CO (high positive, 97%) HER2 (negative, score of 1+ by IHC)  - [employer] grade 1  - Tumor size: 2.3 cm  - Margin-negative on final margin with reexcision for invasive carcinoma but close margin for DCIS of 0.2 cm from new medial margin.    - Angiolymphatic invasion present at the inferior and original medial margin.  - 1 sentinel lymph node removed, negative for carcinoma  - associated DCIS  - Right breast atypical ductal hyperplasia.    - Oncotype DX recurrence score 6 : 10 year risk of recurrence with 5 year of tamoxifen is 5%.    Therapy to date:  6/20/17 -right breast lumpectomy AND left breast lumpectomy, left axillary sentinel lymph node biopsy  6/30/17-reexcision lumpectomy of the left breast  9/8/17-completed adjuvant radiation to the left breast 4256 cGy/16    History of Present Illness    Stephanie is here by herself today.  She reports of increasing fatigue, or poor appetite.  She thinks that she is still having a bit of side effects from radiation treatment and a lot more to recover.  She is planning to have a left hip surgery on 9/27/2017.  She reported she required about 7 weeks recovery after the first surgery of the left hip.  She is now having a lot of hot flashes, no symptoms after Prempro  was taken off.  She denies any unusual vaginal bleeding.  No leg pain or swelling.  No prior history of DVT or PE.    Pain Status  Currently in Pain: Yes    Review of Systems    Constitutional  Constitutional (WDL): Exceptions to WDL  Fatigue: Fatigue not relieved by rest - Limiting instrumental ADL  Fever: None  Chills: None  Weight Gain: None  Weight Loss: None  Neurosensory  Neurosensory (WDL): Exceptions to WDL  Peripheral Motor Neuropathy: None  Ataxia: None  Peripheral Sensory Neuropathy: None  Confusion: None  Syncope: None  Eye   Eye Disorder (WDL): All eye disorder elements are within defined limits  Ear  Ear Disorder (WDL): All ear disorder elements are within defined limits  Cardiovascular  Cardiovascular (WDL): All cardiovascular elements are within defined limits  Edema: Yes (intermittent bilat LE)  Edema Limbs: 5 - 10% inter-limb discrepancy in volume or circumference at point of greatest visible difference, swelling or obscuration of anatomic architecture on close inspection  Pulmonary  Respiratory (WDL): Within Defined Limits  Gastrointestinal  Gastrointestinal (WDL): Exceptions to WDL  Anorexia: Loss of appetite without alteration in eating habits  Constipation: None  Diarrhea: None  Dysphagia: None  Esophagitis: None  Nausea: None  Pharyngitis: None  Vomiting: None  Dysgeusia: None  Dry Mouth: None  Genitourinary  Genitourinary (WDL): All genitourinary elements are within defined limits  Lymphatic  Lymph (WDL): All lymph disorder elements are within defined limits  Musculoskeletal and Connective Tissue  Musculoskeletal and Connetive Tissue Disorders (WDL): Exceptions to WDL  Arthralgia: Moderate pain, limiting instrumental ADL (Left hip)  Integumentary  Integumentary (WDL): All integumentary elements are within defined limits  Patient Coping  Patient Coping: Accepting  Distress Assessment  Distress Assessment Score: 6  Accompanied by  Accompanied by: Alone  Oral Chemo Adherence       Past  History  Past Medical History:   Diagnosis Date     Arrhythmia      Breast cancer      CHF (congestive heart failure)      COPD (chronic obstructive pulmonary disease)      Coronary artery disease      GERD (gastroesophageal reflux disease)      Hyperlipidemia      Hypertension      Idiopathic cardiomyopathy        Physical Exam    Recent Vitals 9/12/2017   Height -   Weight -   BSA (m2) -   /73   Pulse 67   Temp -   Temp src -   SpO2 94   Some recent data might be hidden     General: alert, awake, not in acute distress  HEENT: Head: Normal, normocephalic, atraumatic.  Eye: Normal external eye, conjunctiva, lids cornea, CATINA.  Ears: Non-tender.  Nose: Normal external nose, mucus membranes and septum.  Pharynx: Dental Hygiene adequate. Normal buccal mucosa. Normal pharynx.  Neck / Thyroid: Supple, no masses, nodes, nodules or enlargement.  Lymphatics: No abnormally enlarged lymph nodes.  Chest: Normal chest wall and respirations. Clear to auscultation.  Breasts:left breast skin appears irritated.  Heart: S1 S2 RRR, no murmur.   Abdomen: abdomen is soft without significant tenderness, masses, organomegaly or guarding  Extremities: normal strength, tone, and muscle mass  Skin: normal. no rash or abnormalities  CNS: non focal.      Lab Results    No results found for this or any previous visit (from the past 168 hour(s)).    Imaging    No results found.      Signed by: Devon Suarez MD

## 2021-06-12 NOTE — PROGRESS NOTES
Pt here ambulatory with her daughter and a teenaged grandson. She is feeling well since surgery, just occasional shooting pains in breast. She was given the new patient folder which included [facility] welcome letter/provider directory, class schedule, RO staff directory, [facility] RT handouts, breast specific RT handout, and ACS RT booklet, and Dr. Gonzales's bio card. I discussed with her the routine for RT including consult, SIM CT with body mold and positioning, daily treatments, weekly RO visits, and common s/e of skin irritation and fatigue. She verbalized her understanding.

## 2021-06-12 NOTE — CONSULTS
[facility] Hematology and Oncology Consult Note    Patient: Irene León  MRN: [id number]  Date of Service: 08/01/2017      Reason for Visit    New diagnosis of breast cancer    Assessment/Plan    ECOG Performance   ECOG Performance Status: 2 (due to left hip)  Distress Assessment  Distress Assessment Score: 8 (due to need for another left hip surgery-hip not healing)    A 72-year-old female with stage IA (pT1b, pN0 (sn),cM0) invasive ductal carcinoma of the left breast, grade 1, associated DCIS, ER/NM positive, HER-2 negative, right breast atypical ductal hyperplasia.  S/P right breast lumpectomy, left breast lumpectomy and left axillary sentinel lymph node biopsy on 6/20/2017.  She has several comorbidities including nonischemic cardiomyopathy (EF 40%), osteoporosis on treatment with oral bisphosphonate, factor V Leiden mutation (details unknown).     - We reviewed all of the available clinical, laboratory, radiographic, and pathologic data. We reviewed the significance of invasive breast cancer including the natural history and likelihood of local and distant recurrence.   - We reviewed NCCN guidelines for further therapy to reduce those risks.    - I reviewed the overall management of early stage breast cancer including surgery, +/- chemotherapy, radiation therapy and endocrine therapy.     -Due to the stage1 disease, low-grade, presence of underlying several comorbidities, I would not favor adjuvant chemotherapy as the potential benefit from chemotherapy is low.  To have better risk assessment, she was discussed about Oncotype testing by Dr. Gonzales and she would like to proceed with Oncotype test.  From that test, we could better estimate the benefit of adjuvant endocrine therapy.      - I discussed about adjuvant endocrine therapy to block estrogen as her tumor was positive for estrogen receptors.  I explained that there to treatment options of aromatase inhibitor versus tamoxifen.  She initially voiced  that she does not have any interest in adjuvant endocrine therapy, or but she is willing to consider after our discussion about adjuvant endocrine therapy.  She has underlying cardiomyopathy and osteoporosis with high risk of fracture, I would not recommend aromatase inhibitor.  I reviewed the tamoxifen as an option of adjuvant endocrine therapy.  She is worried about increased risk of thrombosis with her being a factor V Leiden mutant.  In addition, she is very worried about worsening vasomotor symptoms with tamoxifen type therapy.  She is willing to follow-up after the Oncotype results is available.  If she were to decide to take adjuvant endocrine therapy with tamoxifen, I would like to request factor V Leiden testing as she reported that the testing was done about 20 years ago and records are not available.  I also explained to the patient that the presence of factor V Leiden mutation is not a contraindication for tamoxifen but we should be attention to the side effects and continuing aspirin for possible prophylaxis.        Plan:  -Follow-up with me about 1-2 weeks after completion of radiation to discuss about Oncotype results and adjuvant endocrine therapy.      Problem List    1. Malignant neoplasm of central portion of left female breast       ______________________________________________________________________________    Staging History    Malignant neoplasm of central portion of left female breast    Staging form: Breast, AJCC 7th Edition    - Pathologic stage from 7/26/2017: Stage IA (T1b, N0, cM0) - Signed by Devon Suarez MD on 8/1/2017    History    Ms. León is a pleasant 72-year-old female who was found to have an invasive carcinoma of the left breast by screening mammogram.  Right breast ultrasound-guided core biopsy showed atypical ductal hyperplasia but negative for malignancy on 6/6/2017.  However the biopsy specimen was very limited that cannot exclude ductal carcinoma in situ.  She  subsequently underwent a right breast lumpectomy, left breast lumpectomy, left axillary sentinel lymph node biopsy on 6/20/2017.  Right breast lumpectomy show atypical ductal hyperplasia.  Left breast lumpectomy showed invasive ductal carcinoma, estimated size of carcinoma is <1 cm (0.8-0.9cm), grade 1, presence of DCIS, positive margin.  Left axillary sentinel lymph node excision showed 2 benign lymph nodes.  ER positive (98% strong), OK + (97%, intermediate), HER2/rhys negative by IHC.  She underwent reexcision lumpectomy of left breast on 6/30/2017 with final negative margin for residual invasive carcinoma with close margin for DCIS of 0.2 cm from new medial margin.    She went to meet with Dr. Gonzales for radiation oncology and agreed on adjuvant radiation of the left breast.    She reported that she has been on Prempro for menopausal symptoms for many years.  She weaned herself off 3-4 months ago to half a dose and completely stopped about a month prior to diagnosis of breast cancer.    Past History  Reported she has factor V Leiden mutation (unknown of homozygous or heterozygous).  No previous history of DVT.    Past Medical History:   Diagnosis Date     Arrhythmia      Breast cancer      CHF (congestive heart failure)      COPD (chronic obstructive pulmonary disease)      Coronary artery disease      GERD (gastroesophageal reflux disease)      Hyperlipidemia      Hypertension      Idiopathic cardiomyopathy    She has osteoporosis. Family History   Problem Relation Age of Onset     Osteoporosis       Prostate cancer Brother      Breast cancer Maternal Aunt      Prostate cancer Maternal Uncle        Social History     Social History     Marital status:      Spouse name: N/A     Number of children: N/A     Years of education: N/A     Occupational History     Not on file.     Social History Main Topics     Smoking status: Former Smoker     Quit date: 10/28/2007     Smokeless tobacco: Not on file     Alcohol  "use No     Drug use: No     Sexual activity: Not on file     Other Topics Concern     Not on file     Social History Narrative     and lives in home with 3 flight of steps        Allergies    Allergies   Allergen Reactions     Sulfa (Sulfonamide Antibiotics) Rash     Headaches and dizziness.     Homeopathic Drugs Unknown     Comment: runny nose, Comment: runny nose     Mold Extracts      Morphine (Pf)      hallucinate     Lisinopril Cough, Unknown and Itching     Comment: cough, Comment: cough     Sulfacetamide Sodium Rash       Review of Systems  Pain  Currently in Pain: No/denies  Pain Score (Initial OR Reassessment): No/Denies Pain  Pain Frequency: Intermittent  Location: L hip  Pain Characteristics : Aching  Pain Intervention(s): Rest;Home medication  Response to Interventions: improved    She has chronic night sweats and hot flushes which are definitely worse after stopping Prempro about 2 3 months ago.  She also has cataracts in both eyes and she needs to have a cataract surgery in the right eye.  She already had a cataract surgery of the left eye.  She denies any chest pain but she has intermittent shortness of breath for many years.  She has chronic urinary incontinence.  Apart from that, the remainder of comprehensive ROS was negative.      Menopause in her 50s.      Physical Exam    Recent Vitals 8/1/2017   Height 5' 0\"   Weight 153 lbs   BSA (m2) 1.71 m2   /72   Pulse 66   SpO2 95   Some recent data might be hidden     General: alert, awake, not in acute distress  HEENT: Head: Normal, normocephalic, atraumatic.  Eye: Normal external eye, conjunctiva, lids cornea, CATINA.  Ears: Non-tender.  Nose: Normal external nose, mucus membranes and septum.  Pharynx: Dental Hygiene adequate. Normal buccal mucosa. Normal pharynx.  Neck / Thyroid: Supple, no masses, nodes, nodules or enlargement.  Lymphatics: No abnormally enlarged lymph nodes.  Chest: Normal chest wall and respirations. Clear to " auscultation.  Breasts: bilateral nipples are everted. No palpable masses. liver or breast is fairly fibroglandular breasts.  Postlumpectomy scar is present.  Heart: S1 S2 RRR, no murmur.   Abdomen: abdomen is soft without significant tenderness, masses, organomegaly or guarding  Extremities: normal strength, tone, and muscle mass  Skin: normal. no rash or abnormalities  CNS: non focal.      Lab Results    No results found for this or any previous visit (from the past 168 hour(s)).    Imaging Results    Ct Hip Without Contrast Left    Result Date: 8/2/2017  [facility] CT OF THE LEFT HIP WITHOUT CONTRAST 8/2/2017 2:15 PM INDICATION: Pain in left hip. TECHNIQUE: Noncontrast. Axial, sagittal and coronal thin-section reconstruction. Dose reduction techniques were used. COMPARISON: CT dated 10/28/2015. FINDINGS: Postoperative changes related to revision left total hip replacement with lateral plate and cerclage wire fixation securing a previous periprosthetic fracture. Custom longstem femoral component is present. While assessment is limited due to metallic artifact, there appears to be mature healing along the distal aspects of the fracture noting that the component extending into the lesser trochanter shows incomplete bony bridging as seen on series 5 image 31 where there is lucency at the hardware interface. There is loss of bone stock along the lateral aspect of the femur near the site of the plate fixation, best seen on series 4 image 29. Assessment of lucency is difficult due to metallic artifact. There is no lucency or cortical thickening involving the distal stem. Acetabular component is well-seated without dislocation or subluxation. Soft tissues show no drainable fluid collections or masses. Visualized intrapelvic contents are unremarkable.     CONCLUSION: 1.  Post surgical changes related to revision left total hip replacement. Artifact from hardware limits assessment. There is residual lucency and  irregularity of the lesser trochanter and loss of lateral femoral shaft bone stock. No convincing evidence of an acute periprosthetic fracture although correlation with radiographs is advised.        Signed by: Devon Suarez MD

## 2021-06-12 NOTE — PROGRESS NOTES
[facility] Admission note      Patient: Irene León  MRN: [id number]  Date of Service: 10/26/2020      [facility] [[id number]]  Reason for Visit     Chief Complaint   Patient presents with     H & P       Code Status     FULL CODE    Assessment     -T12 compression fracture  -Acute on chronic low back pain/patient requesting optimization  -ANATOLIY with elevated creatinine 1.4 in the TCU  -Osteoporosis restarted on Prolia  -Pain management no longer on narcotics; no discharge on narcotics including Percocet  -Acute metabolic encephalopathy/delirium felt to be secondary to opioid medication  -CHF with reduced ejection fraction acute exacerbation  -- ANATOLIY /cardiorenal syndrome  -History of COPD and asthma  -Acute on chronic hypoxic respiratory failure ON 3L BY NC  -Peripheral arterial disease  -History of factor V Leiden and occipital stroke on Xarelto  -History of breast cancer  -Concern for mild dementia with baseline cognitive impairment  Slums 13/30  -Generalized weakness      Plan     Patient has been admitted to the TCU for strengthening and rehab  She was admitted to the hospital with a T12 compression fracture and back pain.  Seen by multiple specialist including orthopedics cardiology as well as pain management  Orthopedics saw her and will discharge her with a TLSO brace in place.  She is compliant with her bracing.  Pain management remains a challenge.  She could not tolerate narcotics and became acutely delirious.  Currently on Percocet.  Does not want to take narcotics as she is afraid she will become more confused again.  Pain management reviewed and we will schedule her on Tylenol 650 mg 4 times daily for her  She is also on scheduled Robaxin and gabapentin  Continue with her topical Lidoderm gel.  If no improvement with Robaxin or confusion noted switch to Vistaril.  Unfortunately tramadol was not effective for pain management  Add Celebrex 200 mg twice daily for the  next 10 days  Close follow-up with orthopedics if no improvement for consideration for vertebroplasty  At her request therapy we will try some alternative modalities including a heating pad as she is feels that may be the most effective modality for her.  Continue her calcitonin nasal spray.  Minimize narcotics because of delirium in the hospital  She does not want to take any oxycodone or Dilaudid.  Monitor cognitive status in the TCU  R/C SLUMS 23/30  Minimize any psychotropic drugs which can cause increasing confusion in this patient.  Discontinue her Seroquel  Staff to keep her mood and behavior log  Continue with her trazodone for insomnia  She has chronic hypoxic respiratory failure and is on 3 L of oxygen at bedtime.  She also uses sparingly and during daytime and currently back on her 3 L.  Medication review done for her asthma/COPD.  Unfortunately her OMALIZUMAB that she gets for her asthma may not be covered under pharmacy regulations.  Patient advised to either bring her own supply or we will be holding the medication based on facility request  She also had congestive heart failure exacerbation and is now discharged on Lasix.  Recheck labs reviewed in the TCU  BMP shows stable electrolytes however she does have impaired kidney function with a creatinine of 1.4  Her calcium magnesium and phosphorus are all within limits of normal  Suspect that her elevated creatinine could be a reflection of her high doses of diuretics.  Recheck BMP closely and if still elevated consider decreasing the dose of her diuretics  Check weights closely no lymphedema noted on exam.  Recheck CBC stable  She will follow-up with cardiology as an outpatient in 2 weeks  She is profoundly debilitated and can walk less than 50 feet currently.  Continue with the PT OT and rehab    History     Patient is a very pleasant 75 y.o. female who is admitted to TCU.  Patient presented to the hospital with acute low back pain.  She was admitted in  the hospital.  Imaging revealed acute/subacute inferior endplate compression fracture of T12 with 50% loss of height.  Orthopedics was consulted.  She has been discharged weightbearing as tolerated with a TLSO brace to be worn all day  Outpatient follow-up for consideration of kyphoplasty recommended in 2 to 3 weeks.  She has underlying history of osteoporosis and her Prolia will be restarted  Pain management reviewed with the patient she is on multiple medications including Tylenol with gabapentin.  Also given Robaxin and Lidoderm patch.  Calcitonin added for bone healing.  It has been recommended that oxycodone and hydromorphone be avoided due to delirium.  While in the hospital she developed hospital associated delirium felt to be related to opioid medications.  Those were discontinued with improvement in cognitive status  She also has congestive heart failure.  She had an acute exacerbation felt to be secondary to medication noncompliance.  Echocardiogram revealed an acute ejection fraction of 30%.  She has been discharged on Lasix 40 mg daily.  2 times a day  She will require weight monitoring and management      Past Medical History     Active Ambulatory (Non-Hospital) Problems    Diagnosis     Pain due to fracture     High risk medication use     Heart failure exacerbated by sotalol (H)     T12 compression fracture (H)     Acute on chronic diastolic congestive heart failure (H)     Metabolic encephalopathy     Low back pain     Compression fracture of L1 vertebra, sequela     Chronic respiratory failure with hypoxia (H)     Chronic obstructive pulmonary disease, unspecified COPD type (H)     Chronic systolic congestive heart failure (H)     Hypoxia     S/P hip replacement, left     Hip pain, left     Acute hypoxemic respiratory failure (H)     Chest pain     COPD exacerbation (H)     Herpes zoster without complication     Chest wall pain     Benign essential hypertension     Dyslipidemia     Acute respiratory  failure with hypoxia (H)     Pulsatile tinnitus of both ears     Hot flashes due to tamoxifen     Pleural nodules     Hot flashes, menopausal     Malignant neoplasm of central portion of left female breast (H)     CKD (chronic kidney disease) stage 3, GFR 30-59 ml/min     Dyspnea     Insomnia     Leg pain, left     Femur fracture (H)     GERD (gastroesophageal reflux disease)     Post menopausal syndrome     OP (osteoporosis)     Hypertension     Pulmonary emphysema, unspecified emphysema type (H)     Hyperlipidemia     Centrilobular emphysema (H)     Anisocoria     OAB (overactive bladder)     Factor 5 Leiden mutation, heterozygous (H)     Family history of blood disease     Bladder incontinence     Physical deconditioning     Cardiomyopathy, idiopathic (H)     Depression with anxiety     Osteoarthritis of hip     Herniated intervertebral disc     Past Medical History:   Diagnosis Date     Arrhythmia      Breast cancer (H) 2017     CHF (congestive heart failure) (H)      COPD (chronic obstructive pulmonary disease) (H)      Coronary artery disease      GERD (gastroesophageal reflux disease)      Hx of radiation therapy 2017     Hyperlipidemia      Hypertension      Idiopathic cardiomyopathy (H)        Past Social History     Reviewed, and she  reports that she quit smoking about 13 years ago. She quit smokeless tobacco use about 16 years ago. She reports that she does not drink alcohol or use drugs.    Family History     Reviewed, and family history includes Breast cancer in her maternal aunt; Osteoporosis in an other family member; Prostate cancer in her brother and maternal uncle.    Medication List   Post Discharge Medication Reconciliation Status: discharge medications reconciled, continue medications without change   Current Outpatient Medications on File Prior to Visit   Medication Sig Dispense Refill     celecoxib (CELEBREX) 200 MG capsule Take 200 mg by mouth 2 (two) times a day.       acetaminophen  (TYLENOL) 325 MG tablet Take 2 tablets (650 mg total) by mouth 3 (three) times a day. (Patient taking differently: Take 650 mg by mouth 4 (four) times a day. )  0     ADVAIR DISKUS 500-50 mcg/dose DISKUS USE 1 INHALATION TWICE A DAY 60 each 11     albuterol (PROAIR HFA;PROVENTIL HFA;VENTOLIN HFA) 90 mcg/actuation inhaler Inhale 2 puffs every 4 (four) hours as needed for wheezing. 1 Inhaler 0     ascorbic acid, vitamin C, (VITAMIN C) 1000 MG tablet Take 1,000 mg by mouth daily. Airborne 1 tab daily       aspirin 81 MG EC tablet Take 81 mg by mouth daily.       azelastine (ASTELIN) 137 mcg (0.1 %) nasal spray 2 sprays into each nostril daily.       benzonatate (TESSALON) 200 MG capsule Take 1 capsule (200 mg total) by mouth 3 (three) times a day as needed for cough. 90 capsule 2     calcitonin, salmon, (MIACALCIN) 200 unit/actuation nasal spray Apply 1 spray into alternating nostrils daily for 14 days.  0     chlorhexidine (PERIDEX) 0.12 % solution Apply 15 mL to the mouth or throat at bedtime as needed.        cholecalciferol, vitamin D3, 1,000 unit tablet Take 1,000 Units by mouth daily.       diclofenac sodium (VOLTAREN) 1 % Gel Apply 4 g to area of pain in low back 4 times daily  0     EPINEPHrine (EPIPEN/ADRENACLICK/AUVI-Q) 0.3 mg/0.3 mL injection Inject 0.3 mg into the shoulder, thigh, or buttocks.       famotidine (PEPCID) 20 MG tablet Take 20 mg by mouth daily.       fluticasone (FLONASE) 50 mcg/actuation nasal spray Apply 1 spray into each nostril as needed.        furosemide (LASIX) 40 MG tablet Take 1 tablet (40 mg total) by mouth 2 (two) times a day at 9am and 6pm.  0     gabapentin (NEURONTIN) 300 MG capsule Take 2 capsules (600 mg total) by mouth 2 (two) times a day. 360 capsule 2     HYDROcodone-acetaminophen 5-325 mg per tablet Take 1 tablet by mouth every 4 (four) hours as needed. 20 tablet 0     ipratropium (ATROVENT HFA) 17 mcg/actuation inhaler Inhale 2 puffs every 6 (six) hours. 3 Inhaler 3      ipratropium-albuterol (DUO-NEB) 0.5-2.5 mg/3 mL nebulizer 1 neb 4 times a day until seen by primary and then as needed or as directed 60 vial 1     lidocaine (XYLOCAINE) 5 % ointment Apply to area of pain in low back 4 times daily 35.44 g 0     losartan (COZAAR) 50 MG tablet TAKE 1 TABLET DAILY 90 tablet 3     melatonin 3 mg Tab tablet Take 1 tablet (3 mg total) by mouth at bedtime.  0     methocarbamoL (ROBAXIN) 750 MG tablet Take 1 tablet (750 mg total) by mouth every 6 (six) hours for 14 days.  0     metoprolol succinate (TOPROL-XL) 50 MG 24 hr tablet Take 1.5 tablets (75 mg total) by mouth daily. 135 tablet 3     mirabegron (MYRBETRIQ) 50 mg Tb24 ER tablet Take 1 tablet (50 mg total) by mouth daily. 90 tablet 3     omalizumab (XOLAIR) 150 mg/mL Syrg injection Inject 300 mg under the skin every 14 (fourteen) days.        pantoprazole (PROTONIX) 40 MG tablet Take 1 tablet (40 mg total) by mouth daily. 90 tablet 4     polyethylene glycol (MIRALAX) 17 gram packet Take 1 packet (17 g total) by mouth daily as needed.  0     QUEtiapine (SEROQUEL) 25 MG tablet Take 0.5 tablets (12.5 mg total) by mouth 3 (three) times a day as needed (Agitation that is not redirectable.).  0     rivaroxaban ANTICOAGULANT (XARELTO) 20 mg tablet Take 20 mg by mouth at bedtime.       senna-docusate (PERICOLACE) 8.6-50 mg tablet Take 1 tablet by mouth daily as needed for constipation.  0     simvastatin (ZOCOR) 40 MG tablet Take 1 tablet (40 mg total) by mouth at bedtime. 90 tablet 3     sodium chloride (OCEAN) 0.65 % nasal spray 2 sprays into each nostril as needed.       solifenacin (VESICARE) 5 MG tablet Take 1 tablet (5 mg total) by mouth at bedtime. 90 tablet 3     tamoxifen (NOLVADEX) 20 MG tablet TAKE 1 TABLET DAILY 90 tablet 4     traZODone (DESYREL) 50 MG tablet TAKE 1 TABLET(50 MG) BY MOUTH AT BEDTIME AS NEEDED 90 tablet 2     triamcinolone (KENALOG) 0.1 % cream Apply to rash on arms twice daily for one week 30 g 0      triamcinolone (KENALOG) 0.5 % ointment Apply o foot twice a day 30 g 1     venlafaxine (EFFEXOR-XR) 150 MG 24 hr capsule TAKE 1 CAPSULE DAILY 90 capsule 4     Current Facility-Administered Medications on File Prior to Visit   Medication Dose Route Frequency Provider Last Rate Last Dose     denosumab 60 mg (PROLIA 60 mg/ml)  60 mg Subcutaneous Q6 Months Suzanna Shane MD   60 mg at 02/04/20 1352       Allergies     Allergies   Allergen Reactions     Sulfa (Sulfonamide Antibiotics) Rash     Headaches and dizziness.     Homeopathic Drugs Unknown and Other (See Comments)     Comment: runny nose, Comment: runny nose     Mold Extracts      Morphine (Pf)      hallucinate     Lisinopril Cough, Unknown and Itching     Comment: cough, Comment: cough     Sulfacetamide Sodium Rash       Review of Systems   A comprehensive review of 14 systems was done. Pertinent findings noted here and in history of present illness. All the rest negative.  Constitutional: Negative.  Negative for fever, chills, she has activity change, appetite change and fatigue.   HENT: Negative for congestion and facial swelling.    Eyes: Negative for photophobia, redness and visual disturbance.   Respiratory: Negative for cough and chest tightness.    Cardiovascular: Negative for chest pain, palpitations and leg swelling.   Gastrointestinal: Negative for nausea, diarrhea, constipation, blood in stool and abdominal distention.   Genitourinary: Negative.    Musculoskeletal: Negative.  Pain in her low back  Skin: Negative.    Neurological: Negative for dizziness, tremors, syncope, weakness, light-headedness and headaches.   Hematological: Does not bruise/bleed easily.   Psychiatric/Behavioral: Negative.        Physical Exam     Recent Vitals 10/24/2020   Height -   Weight -   BSA (m2) -   /88   Pulse 85   Temp 97.5   Temp src 1   SpO2 92   Some recent data might be hidden     Currently on 3 L of oxygen by nasal cannula  Constitutional: Oriented to  person, place, and time and appears well-developed.   HEENT:  Normocephalic and atraumatic.  Eyes: Conjunctivae and EOM are normal. Pupils are equal, round, and reactive to light. No discharge.  No scleral icterus. Nose normal. Mouth/Throat: Oropharynx is clear and moist. No oropharyngeal exudate.    NECK: Normal range of motion. Neck supple. No JVD present. No tracheal deviation present. No thyromegaly present.   CARDIOVASCULAR: Normal rate, regular rhythm and intact distal pulses.  Exam reveals no gallop and no friction rub.  Systolic murmur present.  PULMONARY: Effort normal and breath sounds normal. No respiratory distress.No Wheezing or rales.  ABDOMEN: Soft. Bowel sounds are normal. No distension and no mass.  There is no tenderness. There is no rebound and no guarding. No HSM.  MUSCULOSKELETAL: Normal range of motion. No edema and no tenderness. Mild kyphosis,  tenderness  to palpation on her lower thoracic spine  Mobility is limited.  LYMPH NODES: Has no cervical, supraclavicular, axillary and groin adenopathy.   NEUROLOGICAL: Alert and oriented to person, place, and time. No cranial nerve deficit.  Normal muscle tone. Coordination normal.   GENITOURINARY: Deferred exam.  SKIN: Skin is warm and dry. No rash noted. No erythema. No pallor.   EXTREMITIES: No cyanosis, no clubbing, no edema. No Deformity.  PSYCHIATRIC: Normal mood, affect and behavior.      Lab Results     Recent Results (from the past 240 hour(s))   Urinalysis-UC if Indicated    Collection Time: 10/16/20  7:36 PM   Result Value Ref Range    Color, UA Yellow Colorless, Yellow, Straw, Light Yellow    Clarity, UA Clear Clear    Glucose, UA Negative Negative    Bilirubin, UA Negative Negative    Ketones, UA Negative Negative    Specific Gravity, UA 1.005 1.001 - 1.030    Blood, UA Negative Negative    pH, UA 5.0 4.5 - 8.0    Protein, UA Negative Negative mg/dL    Urobilinogen, UA <2.0 E.U./dL <2.0 E.U./dL, 2.0 E.U./dL    Nitrite, UA Negative  Negative    Leukocytes, UA Negative Negative   Basic Metabolic Panel    Collection Time: 10/16/20 11:13 PM   Result Value Ref Range    Sodium 141 136 - 145 mmol/L    Potassium 3.7 3.5 - 5.0 mmol/L    Chloride 106 98 - 107 mmol/L    CO2 21 (L) 22 - 31 mmol/L    Anion Gap, Calculation 14 5 - 18 mmol/L    Glucose 100 70 - 125 mg/dL    Calcium 9.1 8.5 - 10.5 mg/dL    BUN 20 8 - 28 mg/dL    Creatinine 1.49 (H) 0.60 - 1.10 mg/dL    GFR MDRD Af Amer 41 (L) >60 mL/min/1.73m2    GFR MDRD Non Af Amer 34 (L) >60 mL/min/1.73m2   BNP(B-type Natriuretic Peptide)    Collection Time: 10/16/20 11:13 PM   Result Value Ref Range     (H) 0 - 137 pg/mL   Troponin I    Collection Time: 10/16/20 11:13 PM   Result Value Ref Range    Troponin I 0.06 0.00 - 0.29 ng/mL   HM1 (CBC with Diff)    Collection Time: 10/16/20 11:13 PM   Result Value Ref Range    WBC 12.6 (H) 4.0 - 11.0 thou/uL    RBC 4.72 3.80 - 5.40 mill/uL    Hemoglobin 14.2 12.0 - 16.0 g/dL    Hematocrit 43.7 35.0 - 47.0 %    MCV 93 80 - 100 fL    MCH 30.1 27.0 - 34.0 pg    MCHC 32.5 32.0 - 36.0 g/dL    RDW 14.1 11.0 - 14.5 %    Platelets 189 140 - 440 thou/uL    MPV 9.6 8.5 - 12.5 fL    Neutrophils % 77 (H) 50 - 70 %    Lymphocytes % 12 (L) 20 - 40 %    Monocytes % 10 2 - 10 %    Eosinophils % 1 0 - 6 %    Basophils % 1 0 - 2 %    Immature Granulocyte % 1 (H) <=0 %    Neutrophils Absolute 9.7 (H) 2.0 - 7.7 thou/uL    Lymphocytes Absolute 1.5 0.8 - 4.4 thou/uL    Monocytes Absolute 1.2 (H) 0.0 - 0.9 thou/uL    Eosinophils Absolute 0.1 0.0 - 0.4 thou/uL    Basophils Absolute 0.1 0.0 - 0.2 thou/uL    Immature Granulocyte Absolute 0.1 (H) <=0.0 thou/uL   Magnesium    Collection Time: 10/16/20 11:13 PM   Result Value Ref Range    Magnesium 2.2 1.8 - 2.6 mg/dL   ECG 12 lead nursing unit performed    Collection Time: 10/16/20 11:38 PM   Result Value Ref Range    SYSTOLIC BLOOD PRESSURE 111 mmHg    DIASTOLIC BLOOD PRESSURE 69 mmHg    VENTRICULAR RATE 72 BPM    ATRIAL RATE 72 BPM     P-R INTERVAL 140 ms    QRS DURATION 138 ms    Q-T INTERVAL 474 ms    QTC CALCULATION (BEZET) 519 ms    P Axis 44 degrees    R AXIS -68 degrees    T AXIS 99 degrees    MUSE DIAGNOSIS       Normal sinus rhythm  Possible Left atrial enlargement  Left axis deviation  Left bundle branch block  Abnormal ECG  When compared with ECG of 04-JUL-2020 07:45,  No significant change was found  Confirmed by SEE ED PROVIDER NOTE FOR, ECG INTERPRETATION ([id number]),  SHAHRAM KEANE ([id number]) on 10/17/2020 10:08:20 AM     COVID-19 Virus PCR MRF    Collection Time: 10/17/20 12:07 AM    Specimen: Respiratory   Result Value Ref Range    COVID-19 VIRUS SPECIMEN SOURCE Nasopharyngeal     2019-nCOV Not Detected    Comprehensive metabolic panel    Collection Time: 10/17/20  6:05 AM   Result Value Ref Range    Sodium 140 136 - 145 mmol/L    Potassium 3.5 3.5 - 5.0 mmol/L    Chloride 107 98 - 107 mmol/L    CO2 24 22 - 31 mmol/L    Anion Gap, Calculation 9 5 - 18 mmol/L    Glucose 103 70 - 125 mg/dL    BUN 18 8 - 28 mg/dL    Creatinine 1.37 (H) 0.60 - 1.10 mg/dL    GFR MDRD Af Amer 46 (L) >60 mL/min/1.73m2    GFR MDRD Non Af Amer 38 (L) >60 mL/min/1.73m2    Bilirubin, Total 0.5 0.0 - 1.0 mg/dL    Calcium 8.6 8.5 - 10.5 mg/dL    Protein, Total 6.1 6.0 - 8.0 g/dL    Albumin 2.9 (L) 3.5 - 5.0 g/dL    Alkaline Phosphatase 50 45 - 120 U/L    AST 16 0 - 40 U/L    ALT 9 0 - 45 U/L   HM1 (CBC with Diff)    Collection Time: 10/17/20  6:05 AM   Result Value Ref Range    WBC 8.6 4.0 - 11.0 thou/uL    RBC 4.40 3.80 - 5.40 mill/uL    Hemoglobin 13.1 12.0 - 16.0 g/dL    Hematocrit 40.4 35.0 - 47.0 %    MCV 92 80 - 100 fL    MCH 29.8 27.0 - 34.0 pg    MCHC 32.4 32.0 - 36.0 g/dL    RDW 14.0 11.0 - 14.5 %    Platelets 188 140 - 440 thou/uL    MPV 9.8 8.5 - 12.5 fL    Neutrophils % 66 50 - 70 %    Lymphocytes % 18 (L) 20 - 40 %    Monocytes % 14 (H) 2 - 10 %    Eosinophils % 2 0 - 6 %    Basophils % 1 0 - 2 %    Immature Granulocyte % 0 <=0 %    Neutrophils  Absolute 5.6 2.0 - 7.7 thou/uL    Lymphocytes Absolute 1.5 0.8 - 4.4 thou/uL    Monocytes Absolute 1.2 (H) 0.0 - 0.9 thou/uL    Eosinophils Absolute 0.2 0.0 - 0.4 thou/uL    Basophils Absolute 0.1 0.0 - 0.2 thou/uL    Immature Granulocyte Absolute 0.0 <=0.0 thou/uL   Magnesium    Collection Time: 10/17/20  6:05 AM   Result Value Ref Range    Magnesium 2.2 1.8 - 2.6 mg/dL   Magnesium    Collection Time: 10/18/20  5:56 AM   Result Value Ref Range    Magnesium 2.2 1.8 - 2.6 mg/dL   HM2(CBC w/o Differential)    Collection Time: 10/18/20  5:56 AM   Result Value Ref Range    WBC 9.9 4.0 - 11.0 thou/uL    RBC 4.44 3.80 - 5.40 mill/uL    Hemoglobin 13.3 12.0 - 16.0 g/dL    Hematocrit 41.2 35.0 - 47.0 %    MCV 93 80 - 100 fL    MCH 30.0 27.0 - 34.0 pg    MCHC 32.3 32.0 - 36.0 g/dL    RDW 14.0 11.0 - 14.5 %    Platelets 166 140 - 440 thou/uL    MPV 9.4 8.5 - 12.5 fL   Basic Metabolic Panel    Collection Time: 10/18/20  5:56 AM   Result Value Ref Range    Sodium 140 136 - 145 mmol/L    Potassium 3.9 3.5 - 5.0 mmol/L    Chloride 107 98 - 107 mmol/L    CO2 24 22 - 31 mmol/L    Anion Gap, Calculation 9 5 - 18 mmol/L    Glucose 86 70 - 125 mg/dL    Calcium 9.1 8.5 - 10.5 mg/dL    BUN 17 8 - 28 mg/dL    Creatinine 1.14 (H) 0.60 - 1.10 mg/dL    GFR MDRD Af Amer 56 (L) >60 mL/min/1.73m2    GFR MDRD Non Af Amer 46 (L) >60 mL/min/1.73m2   BNP(B-type Natriuretic Peptide)    Collection Time: 10/18/20  6:18 PM   Result Value Ref Range    BNP 2,558 (H) 0 - 137 pg/mL   Potassium    Collection Time: 10/19/20  6:01 AM   Result Value Ref Range    Potassium 3.8 3.5 - 5.0 mmol/L   Magnesium    Collection Time: 10/19/20  6:01 AM   Result Value Ref Range    Magnesium 1.9 1.8 - 2.6 mg/dL   BNP(B-type Natriuretic Peptide)    Collection Time: 10/19/20  6:01 AM   Result Value Ref Range    BNP 2,067 (H) 0 - 137 pg/mL   Magnesium    Collection Time: 10/20/20  6:04 AM   Result Value Ref Range    Magnesium 2.0 1.8 - 2.6 mg/dL   Basic Metabolic Panel     Collection Time: 10/20/20  6:04 AM   Result Value Ref Range    Sodium 137 136 - 145 mmol/L    Potassium 3.3 (L) 3.5 - 5.0 mmol/L    Chloride 98 98 - 107 mmol/L    CO2 29 22 - 31 mmol/L    Anion Gap, Calculation 10 5 - 18 mmol/L    Glucose 87 70 - 125 mg/dL    Calcium 9.1 8.5 - 10.5 mg/dL    BUN 15 8 - 28 mg/dL    Creatinine 1.17 (H) 0.60 - 1.10 mg/dL    GFR MDRD Af Amer 55 (L) >60 mL/min/1.73m2    GFR MDRD Non Af Amer 45 (L) >60 mL/min/1.73m2   Troponin I    Collection Time: 10/20/20 12:01 PM   Result Value Ref Range    Troponin I 0.05 0.00 - 0.29 ng/mL   BNP(B-type Natriuretic Peptide)    Collection Time: 10/20/20 12:01 PM   Result Value Ref Range     (H) 0 - 137 pg/mL   Echo Complete    Collection Time: 10/20/20  3:39 PM   Result Value Ref Range    LV volume diastolic 77.1 46 - 106 cm3    LV volume systolic 41.6 14 - 42 cm3    HR 80 bpm    IVSd 1.14 (!) 0.6 - 0.9 cm    LVIDd 5.0 3.8 - 5.2 cm    LVIDs 3.86 (!) 2.2 - 3.5 cm    LVOT diam 1.8 cm    LVOT mean gradient 1 mmHg    LVOT peak VTI 14.8 cm    LVOT mean carolyn 48.8 cm/s    LVOT peak carolyn 75 cm/s    LVOT peak gradient 2 mmHg    LV PWd 1.12 (!) 0.6 - 0.9 cm    MV E' lat carolyn 6.58 cm/s    MV E' med carolyn 3.29 cm/s    AV mean carolyn 103 cm/s    AV mean gradient 5 mmHg    AV VTI 27.6 cm    AV peak carolyn 155 cm/s    AO root 3.1 cm    AO ascending 2.9 cm    LA size 3.6 cm    LA/AO root ratio 1.16 no units    MV decel slope 3,640 mm/s2    MV decel time 174 ms    MV P 1/2 time 51 ms    MV peak A carolyn 139 cm/s    MV peak E carolyn 63.4 cm/s    TR peak carolyn 408.0 cm/s    BSA 1.76 m2    Hieght 62 in    Weight 2,404 lbs    /64 mmHg    IVS/PW ratio 1.0     TR peak gradent 66.6 mmHg    LV FS 22.8 28 - 44 %    Echo LVEF calculated 46 55 - 75 %    LA volume 58.1 mL    LV mass 215.0 g    AV area 1.4 cm2    AV DIM IND carolyn 0.5     MV area p 1/2 time 4.3 cm2    MV E/A Ratio 0.5     LVOT area 2.54 cm2    LVOT SV 37.6 cm3    AV peak gradient 9.6 mmHg    LV systolic volume index 23.6 8 -  24 cm3/m2    LV diastolic volume index 43.8 29 - 61 cm3/m2    LA volume index 33.0 mL/m2    LV mass index 122.1 g/m2    LV SVi 21.4 ml/m2    MV med E/e' ratio 19.3     MV lat E/e' ratio 9.6     LV CO 3.0 l/min    LV Ci 1.7 l/min/m2    LA area 2 19 cm2    LA area 1 18 cm2    Height 62.0 in    Weight 150 lbs    MV Avg E/e' Ratio 12.8 cm/s    LA length 5.0 cm    AV DIM IND VTI 0.5     Echo LVEF Estimated 40 %   Troponin I    Collection Time: 10/20/20  6:04 PM   Result Value Ref Range    Troponin I 0.06 0.00 - 0.29 ng/mL   Magnesium    Collection Time: 10/21/20  6:37 AM   Result Value Ref Range    Magnesium 2.0 1.8 - 2.6 mg/dL   Basic Metabolic Panel    Collection Time: 10/21/20  6:37 AM   Result Value Ref Range    Sodium 139 136 - 145 mmol/L    Potassium 3.4 (L) 3.5 - 5.0 mmol/L    Chloride 99 98 - 107 mmol/L    CO2 27 22 - 31 mmol/L    Anion Gap, Calculation 13 5 - 18 mmol/L    Glucose 85 70 - 125 mg/dL    Calcium 9.0 8.5 - 10.5 mg/dL    BUN 17 8 - 28 mg/dL    Creatinine 1.12 (H) 0.60 - 1.10 mg/dL    GFR MDRD Af Amer 57 (L) >60 mL/min/1.73m2    GFR MDRD Non Af Amer 47 (L) >60 mL/min/1.73m2   ECG 12 lead with MUSE, prior to first dose of antipsychotics.    Collection Time: 10/21/20  9:42 AM   Result Value Ref Range    SYSTOLIC BLOOD PRESSURE      DIASTOLIC BLOOD PRESSURE      VENTRICULAR RATE 72 BPM    ATRIAL RATE 72 BPM    P-R INTERVAL 134 ms    QRS DURATION 134 ms    Q-T INTERVAL 450 ms    QTC CALCULATION (BEZET) 492 ms    P Axis 80 degrees    R AXIS -78 degrees    T AXIS 106 degrees    MUSE DIAGNOSIS       Sinus rhythm with occasional and consecutive Premature ventricular complexes  Left axis deviation  Non-specific intra-ventricular conduction block  T wave abnormality, consider lateral ischemia  Abnormal ECG  When compared with ECG of 16-OCT-2020 23:38,  Premature ventricular complexes are now Present  Confirmed by JUAN LUCIO, MILADY LOC:SONNY ([id number]) on 10/21/2020 2:40:44 PM     Urinalysis-UC if Indicated     Collection Time: 10/21/20 11:07 AM   Result Value Ref Range    Color, UA Straw Colorless, Yellow, Straw, Light Yellow    Clarity, UA Clear Clear    Glucose, UA Negative Negative    Bilirubin, UA Negative Negative    Ketones, UA Negative Negative    Specific Gravity, UA 1.005 1.001 - 1.030    Blood, UA Negative Negative    pH, UA 6.0 4.5 - 8.0    Protein, UA Negative Negative mg/dL    Urobilinogen, UA <2.0 E.U./dL <2.0 E.U./dL, 2.0 E.U./dL    Nitrite, UA Negative Negative    Leukocytes, UA Negative Negative   Potassium    Collection Time: 10/21/20  9:06 PM   Result Value Ref Range    Potassium 3.8 3.5 - 5.0 mmol/L   Potassium - Next AM    Collection Time: 10/22/20  5:53 AM   Result Value Ref Range    Potassium 3.5 3.5 - 5.0 mmol/L   Basic Metabolic Panel    Collection Time: 10/22/20 11:59 AM   Result Value Ref Range    Sodium 137 136 - 145 mmol/L    Potassium 4.3 3.5 - 5.0 mmol/L    Chloride 101 98 - 107 mmol/L    CO2 23 22 - 31 mmol/L    Anion Gap, Calculation 13 5 - 18 mmol/L    Glucose 97 70 - 125 mg/dL    Calcium 9.4 8.5 - 10.5 mg/dL    BUN 17 8 - 28 mg/dL    Creatinine 1.06 0.60 - 1.10 mg/dL    GFR MDRD Af Amer >60 >60 mL/min/1.73m2    GFR MDRD Non Af Amer 51 (L) >60 mL/min/1.73m2   Potassium - Next AM    Collection Time: 10/23/20  6:12 AM   Result Value Ref Range    Potassium 3.7 3.5 - 5.0 mmol/L   Basic Metabolic Panel    Collection Time: 10/24/20  6:08 AM   Result Value Ref Range    Sodium 140 136 - 145 mmol/L    Potassium 4.1 3.5 - 5.0 mmol/L    Chloride 103 98 - 107 mmol/L    CO2 24 22 - 31 mmol/L    Anion Gap, Calculation 13 5 - 18 mmol/L    Glucose 89 70 - 125 mg/dL    Calcium 9.9 8.5 - 10.5 mg/dL    BUN 18 8 - 28 mg/dL    Creatinine 1.05 0.60 - 1.10 mg/dL    GFR MDRD Af Amer >60 >60 mL/min/1.73m2    GFR MDRD Non Af Amer 51 (L) >60 mL/min/1.73m2   Basic Metabolic Panel    Collection Time: 10/26/20  9:55 AM   Result Value Ref Range    Sodium 141 136 - 145 mmol/L    Potassium 4.7 3.5 - 5.0 mmol/L     Chloride 106 98 - 107 mmol/L    CO2 14 (L) 22 - 31 mmol/L    Anion Gap, Calculation 21 (H) 5 - 18 mmol/L    Glucose 91 70 - 125 mg/dL    Calcium 10.0 8.5 - 10.5 mg/dL    BUN 24 8 - 28 mg/dL    Creatinine 1.41 (H) 0.60 - 1.10 mg/dL    GFR MDRD Af Amer 44 (L) >60 mL/min/1.73m2    GFR MDRD Non Af Amer 36 (L) >60 mL/min/1.73m2   Calcium    Collection Time: 10/26/20  9:55 AM   Result Value Ref Range    Calcium 10.0 8.5 - 10.5 mg/dL   Magnesium    Collection Time: 10/26/20  9:55 AM   Result Value Ref Range    Magnesium 2.1 1.8 - 2.6 mg/dL   Phosphorus    Collection Time: 10/26/20  9:55 AM   Result Value Ref Range    Phosphorus 4.2 2.5 - 4.5 mg/dL   HM2(CBC w/o Differential)    Collection Time: 10/26/20  9:55 AM   Result Value Ref Range    WBC 10.8 4.0 - 11.0 thou/uL    RBC 4.90 3.80 - 5.40 mill/uL    Hemoglobin 14.5 12.0 - 16.0 g/dL    Hematocrit 45.7 35.0 - 47.0 %    MCV 93 80 - 100 fL    MCH 29.6 27.0 - 34.0 pg    MCHC 31.7 (L) 32.0 - 36.0 g/dL    RDW 13.8 11.0 - 14.5 %    Platelets 185 140 - 440 thou/uL    MPV 10.2 8.5 - 12.5 fL            Imaging Results     Echo Complete    Result Date: 10/20/2020    Normal left ventricular size.The estimated left ventricular ejection fraction is 40%. This represents a moderately decreased ejection fraction. Mild hypertrophy noted. Abnormal septal motion consistent with left bundle branch block. Left ventricular diastolic dysfunction. Normal left atrial pressure.   Normal right ventricular size and systolic function.   Moderate pulmonary hypertension present. The estimated systolic pulmonary artery pressure is 69 mmhg.   When compared to the previous study dated 7/27/2020, pulmonary pressure is elevated now.      Xr Chest 2 Views    Result Date: 10/16/2020  EXAM: XR CHEST 2 VIEWS LOCATION: [facility] [place] DATE/TIME: 10/16/2020 10:55 PM INDICATION: Hypoxia, shortness of breath. COMPARISON: 7/4/2020.     Heart size upper limits of normal. Old lower right rib  fractures. Presumed fat pad right cardiophrenic angle. Emphysematous change.    Ct Head Without Contrast    Result Date: 10/21/2020  EXAM: CT HEAD WO CONTRAST LOCATION: [facility] DATE/TIME: 10/21/2020 11:25 AM INDICATION: Altered mental status. Confusion. New Onset Delirium COMPARISON: 5/29/2020 CT sinus. TECHNIQUE: Routine without IV contrast. Multiplanar reformats. Dose reduction techniques were used. FINDINGS: INTRACRANIAL CONTENTS: No intracranial hemorrhage, extraaxial collection, or mass effect.  No CT evidence of acute infarct. 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 Mild presumed chronic small vessel ischemic changes. Mild generalized volume loss. No hydrocephalus. VISUALIZED ORBITS/SINUSES/MASTOIDS: No intraorbital abnormality. Unchanged sequela of chronic left sphenoid sinusitis. Opacification of the maxillary sinuses. No middle ear or mastoid effusion. BONES/SOFT TISSUES: No acute abnormality.     1.  No acute intracranial abnormality. 2.  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 3.  Mild presumed chronic small vessel ischemic changes.    Ct Lumbar Spine Without Contrast    Result Date: 10/16/2020  EXAM: CT LUMBAR SPINE WO CONTRAST LOCATION: [facility] DATE/TIME: 10/16/2020 9:32 PM INDICATION: Back pain or radiculopathy, cancer or infection suspected. COMPARISON: CT chest 06/12/2020 and CT chest, abdomen, and pelvis 06/11/2019, plain film chest of 06/24/2020. TECHNIQUE: Routine without IV contrast. Multiplanar reformats.  Dose reduction techniques were used. FINDINGS: VERTEBRA: 5 lumbar-type vertebral bodies. Mild broad right convexity lumbar curvature. Alignment is otherwise normal. Mild inferior endplate compression of T12 is new since the 06/12/2020 CT and the 06/24/2020 plain film. Approximately 50% loss of height  anteriorly. Fracture lines are still evident with some mild prevertebral stranding/hematoma suggesting  this is likely an acute/subacute fracture. Mild retropulsion of the posteroinferior cortex with a prominent right paracentral extrusion also noted. There is only mild narrowing of the canal though there is significant right lateral recess narrowing. No other fracture. Marked loss of disc space height at all lumbar levels apart from L4-L5 where it is mild to moderate. Vacuum disc disease at all lumbar levels. Moderate L5-S1 facet arthropathy. CANAL/FORAMINA: Mild to moderate L4-L5 and mild L2-L3 canal narrowing. Mild to moderate left L3-L4 and moderate to marked right L5-S1 degenerative foraminal narrowing. PARASPINAL: Left iliopsoas atrophy. Left renal cyst. No other extraspinal abnormality.     1.  Likely recent acute/subacute inferior endplate compression of T12 with approximately 50% loss of height. Mild buckling and retropulsion of the posteroinferior cortex in combination with a right paracentral extrusion results in mild canal narrowing though there is significant right lateral recess narrowing. 2.  No other fracture 3.  Moderate to marked lumbar spondylosis elsewhere with mild to moderate narrowing of the canal at L4-L5 and mild changes at L2-L3. Moderate to marked right L5-S1 degenerative foraminal narrowing. NOTE: ABNORMAL REPORT THE DICTATION ABOVE DESCRIBES AN ABNORMALITY FOR WHICH FOLLOW-UP IS NEEDED.     Us Arterial Segmental Pressures Legs 3 Or More Levels    Result Date: 10/18/2020  [place] [facility] LOCATION: [facility] DATE: 10/18/2020 1. ANKLE BRACHIAL INDICES AT REST 2. SEGMENTAL PRESSURES INDICATION: Leg discoloration, leg temperature change, diabetes, claudication and leg pain, hypertension. COMPARISON: None. FINDINGS: SEGMENTAL BP RIGHT (mmHg) Brachial: 124 Low Thigh: 213; Index 1.32 Calf: 227; Index 1.38 Ankle (PT): 177; Index 1.08 Ankle (DP): 152; Index 0.93 Digit: 94; Index 0.57 LEFT (mmHg) Brachial: 164 Low Thigh: 133; Index 0.81 Calf: 127; Index 0.77 Ankle (PT):  111; Index 0.68 Ankle (DP): 117; Index 0.71 Digit: 106; Index 0.65 Patient was unable to exercise due to fall risk and shortness of breath. There are multiphasic right posterior tibial waveforms. Monophasic right dorsalis pedis and left PT/DP waveforms.     CONCLUSION: 1. Left RONY is abnormal at 0.71 consistent with moderate resting arterial sufficiency. Segmental pressures suggest SFA or proximal inflow disease. Toe brachial index is near normal. 2. Right RONY at rest is normal at 1.08. Toe brachial index is 0.57. Great digit pressure is considered sufficient for potential wound healing. Segmental pressures indicate component of infrapopliteal disease.             VASHTI Sanchez

## 2021-06-12 NOTE — PROGRESS NOTES
[facility] Radiation Oncology Consult Note    Patient: Irene León  MRN: [id number]  Date of Service: 07/27/2017    Assessment / Impression     1. Malignant neoplasm of central portion of left female breast  Oncotype Test     Malignant neoplasm of central portion of left female breast    Staging form: Breast, AJCC 7th Edition    - Pathologic stage from 7/26/2017: Stage IA (T1b, N0, cM0) - Unsigned  ECOG Peformance Status  ECOG Performance Status: 2  Distress Assessment Score: 3 (dx and tx plan)    Plan:   Oncotype as patient not interested in hormonal therapy.  To see  August 1. Transferring care, not seeing Dr. Vásquez.     Face to face time  60 minutes with > 75% spent on consultation, education and coordination of care.  Intent of Therapy: Curative  Patient on concurrent Herceptin No  Adjuvant hormonal therapy: Yes:  Agent: TBD   Start: Following radiation  Chemotherapy: n/a   Intended fractionation schedule - hypofractionation without boost. [facility]     Breast cancer risk factors:   No obstetric history on file.  LMP Dates from Last 4 Encounters:   No data found for LMP       We recommend adjuvant radiation as part of her breast conserving therapy to decrease her chance of local recurrence to the single digits. ROM stretches and skin care were discussed with the patient. She understands that these maneuvers need to continue for 6 months following completion of radiation as skin and muscle fibrosis continue to form for weeks to months following completion of therapy.      Side effects that may occur during or within weeks after radiation therapy      Fatigue and general weakness    Darkening, irritation, itchiness, redness, dryness, erythema, peeling, scabbing, ulceration and contraction of the skin of the breast and chest    Swelling of the breast    Loss of armpit hair    Lung irritation    Decrease in appetite    Side effects that may occur months or years after radiation therapy      Development of  another tumor or cancer    Thickening, telangiectasias (development of spider like blood vessels in the skin) and ulceration of the skin of the breast and chest    Firming, fibrosis (scar tissue), fat necrosis, and distortion of the breast    Poor healing after a trauma or surgery in the irradiated area    Nerve damage resulting in loss of arm strength and sensation    Coronary artery blockage causing angina pain or a heart attack    Lung inflammation of fibrosis causing cough, fever and shortness of breath    Fracture of the ribs    Swellingof an arm and hand    The risks, benefits and alternatives to radiation therapy were outlined with the patient. All questions were answered and a consent was signed.        Subjective:      HPI: Irene León is a 72 y.o. female referred with left breast cancer referred for adjuvant radiation as part of her breast conservation therapy.    Mammographically detected. Biopsy at 12:00  A) LEFT BREAST, 12:00, ZONE 2, STEREOTACTIC-GUIDED CORE BIOPSY:   1. Invasive ductal carcinoma      a. Odonnell grade: I of III; Leena score: 5 of 9      b. Angio-lymphatic invasion: Suspicious      c. Associated DCIS: Present      d. Subtype: Cribriform        e. Grade of DCIS: 2 of 3   2. Atypical lobular hyperplasia (ALH)  ESTROGEN RECEPTOR: POSITIVE       (98% of tumor nuclei; Strong staining)     PROGESTERONE RECEPTOR: POSITIVE       (97% of tumor nuclei; Intermediate staining)     HER2 BY IMMUNOHISTOCHEMISTRY: NEGATIVE FOR HER2 OVER-EXPRESSION       (1+ staining)     Lumpectomy and SLN done by Dr. Patel Pathology significant for 10mm Grade I LVI neg, invasive ductal carcinoma, with associated DCIS. Close DCIS margin. 0/2 SLN  Re-excision 6/30 had 2mm of residual DCIS, margin neg. Uneventful post op course.     Seen by Dr. Vásquez.     Prior Radiation: No  Concurrent Chemotherapy: No    Current Outpatient Prescriptions   Medication Sig Dispense Refill     ADVAIR DISKUS 250-50  mcg/dose DISKUS Inhale 1 puff 2 (two) times a day.        alendronate (FOSAMAX) 70 MG tablet Take 70 mg by mouth every 7 days. Take in the morning on an empty stomach with a full glass of water 30 minutes before food on Saturday       ammonium lactate (AMLACTIN) 12 % cream Apply 1 application topically 2 (two) times a day as needed for dry skin (to feet).       aspirin 81 MG EC tablet Take 81 mg by mouth bedtime.        cetirizine (ZYRTEC) 10 MG tablet Take 10 mg by mouth bedtime.       chlorhexidine (PERIDEX) 0.12 % solution Apply 15 mL to the mouth or throat 2 (two) times a day.       cholecalciferol, vitamin D3, 1,000 unit tablet Take 1,000 Units by mouth daily.       fesoterodine (TOVIAZ) 8 mg Tb24 Take 8 mg by mouth daily. Also takes vesicare       fluticasone (FLONASE) 50 mcg/actuation nasal spray 1 spray into each nostril daily.       furosemide (LASIX) 20 MG tablet Take 20 mg by mouth daily.        IPRATROPIUM BROMIDE (ATROVENT HFA INHL) Inhale.       losartan (COZAAR) 50 MG tablet Take 50 mg by mouth daily.        metoprolol succinate (TOPROL-XL) 100 MG 24 hr tablet Take 100 mg by mouth daily.        mirtazapine (REMERON) 7.5 MG tablet Take 7.5 mg by mouth.       multivitamin therapeutic (THERAGRAN) tablet Take 1 tablet by mouth daily.       NON FORMULARY otc turmeric       pantoprazole (PROTONIX) 40 MG tablet Take 40 mg by mouth daily.       PROVENTIL HFA 90 mcg/actuation inhaler Inhale 1 puff every 6 (six) hours as needed.        ranitidine (ZANTAC) 150 MG tablet Take 150 mg by mouth daily.       simvastatin (ZOCOR) 40 MG tablet Take 40 mg by mouth bedtime.       solifenacin (VESICARE) 10 MG tablet Take 5 mg by mouth daily as needed. Also takes Toviaz       traMADol (ULTRAM) 50 mg tablet Take 50 mg by mouth every 8 (eight) hours as needed for pain.       traZODone (DESYREL) 50 MG tablet Take 50 mg by mouth bedtime.       vitamin E 400 UNIT capsule Take 400 Units by mouth daily.       No current  facility-administered medications for this visit.      Past Medical History:   Diagnosis Date     Arrhythmia      Breast cancer      CHF (congestive heart failure)      COPD (chronic obstructive pulmonary disease)      Coronary artery disease      GERD (gastroesophageal reflux disease)      Hyperlipidemia      Hypertension      Idiopathic cardiomyopathy      Past Surgical History:   Procedure Laterality Date     BREAST LUMPECTOMY Left 06/2017    x2     CARDIAC CATHETERIZATION       CATARACT EXTRACTION Left 07/18/2017     AR OPEN TX FEMORAL FRACTURE DISTAL MED/LAT CONDYLE Left 10/28/2015    Procedure: OPEN REDUCTION INTERNAL FIXATION LEFT  PROXIMAL FEMUR PERIPROSTHETIC FRACTURE;  Surgeon: Yovanny Albarran MD;  Location: [facility] OR;  Service: Orthopedics     REVISION TOTAL HIP ARTHROPLASTY Left      Sulfa (sulfonamide antibiotics); Homeopathic drugs; Mold extracts; Morphine (pf); Lisinopril; and Sulfacetamide sodium  Family History   Problem Relation Age of Onset     Osteoporosis       Prostate cancer Brother      Breast cancer Maternal Aunt      Prostate cancer Maternal Uncle      Social History     Social History     Marital status:      Spouse name: N/A     Number of children: N/A     Years of education: N/A     Occupational History     Not on file.     Social History Main Topics     Smoking status: Former Smoker     Quit date: 10/28/2007     Smokeless tobacco: Not on file     Alcohol use No     Drug use: No     Sexual activity: Not on file     Other Topics Concern     Not on file     Social History Narrative     and lives in home with 3 flight of steps        Review of Systems:        General  General (WDL): Exceptions to WDL  Night Sweats: Yes - Chronic (Less than 3 months)  EENT  ENT (WDL): Exceptions to WDL  Changes in vision: Yes - Chronic (Greater than 3 months) (cataracts)  Respiratory       Respiratory (WDL): Exceptions to WDL  Dyspnea: Yes - Chronic (Greater than 3 months)  (COPD)  Cardiovascular  Cardiovascular (WDL): All cardiovascular elements are within defined limits (has CHF)  Endocrine  Endocrine (WDL): Exceptions to WDL  Hotflashes: Yes- Chronic (Greater than 3 months)  Gastrointestinal  Gastrointestinal (WDL): All gastrointestinal elements are within defined limits  Musculoskeletal  Musculoskeletal (WDL): Exceptions to WDL  Range of Motion Limitation: Yes - Chronic (Greater than 3 months) (LLE)  Integumentary                  Neurological  Neurological (WDL): All neurological elements are within defined limits  Psychological/Emotional   Psychological/Emotional (WDL): All psychological/emotional elements are within defined limits  Hematological/Lymphatic  Hematological/Lymphatic (WDL): All hematological/lymphatic elements are within defined limits  Dermatologic  Dermatologic (WDL): All dermatological elements are within defined limits  Genitourinary/Reproductive  Genitourinary/Reproductive (WDL): Exceptions to WDL  Urinary Frequency: Yes - Chronic (Greater than 3 months)  Urinary Incontinence: Yes - Chronic (Greater than 3 months)  Reproductive     Pain              Currently in Pain: Yes  Pain Score (Initial OR Reassessment): 3  Pain Frequency: Constant/continuous  Location: L hip  Pain Intervention(s): Home medication  Response to Interventions: helps  Accompanied by         Objective:     Physical Exam    Vitals:    07/27/17 1139   BP: 130/88   Pulse: (!) 58   SpO2: 94%   Weight: 153 lb (69.4 kg)   Height: 5' (1.524 m)       Physical Exam     Head: Normocephalic, without obvious abnormality, atraumatic  Neck: no adenopathy and supple, symmetrical, trachea midline  Lungs: clear to auscultation bilaterally  Breasts:  expected post operative changes, no masses skin changes suggestive of recurrence or infection.   Heart: regular rate and rhythm  Skin: Skin color, texture, turgor normal. No rashes or lesions  Lymph nodes: Cervical, supraclavicular, and axillary nodes  normal.  Extremities: No lymphedema, full ROM UE.   Neurologic: Grossly normal  Pysch: affect normal, thought content appropriate.      Recent Labs: No results found for this or any previous visit (from the past 168 hour(s)).    Imaging: Mammogram reviewed.     Pathology:   No results found for this or any previous visit (from the past 8760 hour(s)).      Signed by: Andra Gonzales MD

## 2021-06-12 NOTE — PROGRESS NOTES
RADIATION ONCOLOGY WEEKLY TREATMENT VISIT NOTE      Assessment / Impression       1. Malignant neoplasm of central portion of left female breast       Malignant neoplasm of central portion of left female breast    Staging form: Breast, AJCC 7th Edition    - Pathologic stage from 7/26/2017: Stage IA (T1b, N0, cM0) - Signed by Devon Suarez MD on 8/1/2017   Tolerating radiation therapy well.  All questions and concerns addressed.  Completed this course of RT to lt breast , 4256cGy in 16 fx  See Dr Christian sánchez  Doing well no c/o  Exam lungs clear   Heart normal sounds  Skin intact n color    Plan:     Continue radiation treatment as prescribed.  Radiation: Site: Left Breast  Stereotactic Radiosurgery: No  Stereotactic Radiosurgery: No  Concurrent Therapy: No  Today's Dose: 4256  Total Dose for Breast: 4256  Today's Fraction/Total Fraction Breast: 16/16    F/U PER PT REQUEST 8-10 WEEKS    Subjective:      HPI: Irene León is a 72 y.o. female with    1. Malignant neoplasm of central portion of left female breast         The following portions of the patient's history were reviewed and updated as appropriate: allergies, current medications, past family history, past medical history, past social history, past surgical history and problem list.      Objective:     Exam:     Vitals:    09/08/17 1002   BP: 151/80   Pulse: 60   Temp: 97.6  F (36.4  C)   TempSrc: Oral   SpO2: 97%   Weight: 152 lb 9.6 oz (69.2 kg)   Height: 5' (1.524 m)       Wt Readings from Last 8 Encounters:   09/08/17 152 lb 9.6 oz (69.2 kg)   09/06/17 152 lb 8 oz (69.2 kg)   08/30/17 150 lb 12.8 oz (68.4 kg)   08/23/17 151 lb 6.4 oz (68.7 kg)   08/01/17 153 lb (69.4 kg)   07/27/17 153 lb (69.4 kg)   06/06/17 155 lb (70.3 kg)   03/01/17 152 lb (68.9 kg)       General: Alert and oriented, in no acute distress  Irene has mild Erythema.    Treatment Summary to Date    Aria chart and setup information reviewed    Jerrell Martins MD

## 2021-06-12 NOTE — PROGRESS NOTES
[facility] Admission note      Patient: Irene León  MRN: [id number]  Date of Service: 11/4/2020      [facility] [[id number]]  Reason for Visit     Chief Complaint   Patient presents with     Problem Visit   F/U PAIN /MUSCLE SPASMS/ chf    Code Status     FULL CODE    Assessment     -Acute CHF exacerbation with persistent weight gain in spite of being on a higher dose of Lasix  -Ongoing concerns about cough and congestion with hypoxic respiratory failure  -T12 compression fracture  -ANATOLIY with elevated creatinine 1.4 in the TCU  -Osteoporosis restarted on Prolia  -Pain management no longer on narcotics; no discharge on narcotics including Percocet  -Acute metabolic encephalopathy/delirium felt to be secondary to opioid medication  -CHF with reduced ejection fraction acute exacerbation  -- ANATOLIY /cardiorenal syndrome  -History of COPD and asthma  -Acute on chronic hypoxic respiratory failure ON 3L BY NC  -Peripheral arterial disease  -History of factor V Leiden and occipital stroke on Xarelto  -History of breast cancer  -Concern for mild dementia with baseline cognitive impairment  Slums 13/30  -Generalized weakness      Plan     Patient has been admitted to the TCU for strengthening and rehab  Urgently because of ongoing concerns about weight gain with persistent cough and congestion.  She has ongoing coughing and difficulty breathing though her oxygen needs appear to be baseline at 3 L.  Staff has been giving her Mucinex.  She has clearly not responded to Lasix 80 and 40 regimen with higher weight gain noted of 1 pound further since yesterday and weight now at 150 pounds.  .  We will discontinue her Lasix.  Bumex 2 mg to be administered stat.  Bumex 2 mg in the morning and 1 mg in the afternoon for the next few days.  Recheck labs reviewed and her creatinine is 1.4 her discharge creatinine was also 1.4 so we will monitor trends without adjusting.  She will need a close monitoring  "of BMP again in 2 days.  Continue with her supplemental oxygen needs.  Chest x-ray to be done stat.  In light of her respiratory issues stat Covid testing was done and she has tested negative.  She does not \"need contact isolation precautions because of this reason.  Follow-up on results of labs and x-rays.  Send her to the ER if no response noted with changes in medication  Pain management reviewed and she continues to rate her pain is high we will not consider tapering during this visit as coughing could probably be exacerbating her pain.  Monitor pain and reassess at my next visit  Referral has been given to her orthopedic clinic for further treatments      History     Patient is a very pleasant 75 y.o. female who is admitted to TCU.  Seen today again because of worsening CHF symptoms with progressive weight gain weight is actually up 250 pounds she was put on a higher dose of Lasix with no improvement no resolution   she has been coughing a lot.  Staff has been giving her some cough syrup and Mucinex with some improvement  Patient presented to the hospital with acute low back pain.  She was admitted in the hospital.  Imaging revealed acute/subacute inferior endplate compression fracture of T12 with 50% loss of height.  Orthopedics was consulted.  She has been discharged weightbearing as tolerated with a TLSO brace to be worn all day  Outpatient follow-up for consideration of kyphoplasty recommended in 2 to 3 weeks.  Has been given to her for follow-up with orthopedics in addition she has developed piriformis syndrome on the right side and and the past that responded by injections.  We have given her a referral to see if that would be effective for her.  She needs to follow-up with orthopedics.  Due to ongoing cough and congestion issue stat Covid testing was done which has come back negative.  She was tested prior on 10/27/2020 also and was negative.  She remains afebrile.  Pain remains her biggest issue.  Mood " and behaviors have been stable      Past Medical History     Active Ambulatory (Non-Hospital) Problems    Diagnosis     Pain due to fracture     High risk medication use     Heart failure exacerbated by sotalol (H)     T12 compression fracture (H)     Acute on chronic diastolic congestive heart failure (H)     Metabolic encephalopathy     Low back pain     Compression fracture of L1 vertebra, sequela     Chronic respiratory failure with hypoxia (H)     Chronic obstructive pulmonary disease, unspecified COPD type (H)     Chronic systolic congestive heart failure (H)     Hypoxia     S/P hip replacement, left     Hip pain, left     Acute hypoxemic respiratory failure (H)     Chest pain     COPD exacerbation (H)     Herpes zoster without complication     Chest wall pain     Benign essential hypertension     Dyslipidemia     Acute respiratory failure with hypoxia (H)     Pulsatile tinnitus of both ears     Hot flashes due to tamoxifen     Pleural nodules     Hot flashes, menopausal     Malignant neoplasm of central portion of left female breast (H)     CKD (chronic kidney disease) stage 3, GFR 30-59 ml/min     Dyspnea     Insomnia     Leg pain, left     Femur fracture (H)     GERD (gastroesophageal reflux disease)     Post menopausal syndrome     OP (osteoporosis)     Hypertension     Pulmonary emphysema, unspecified emphysema type (H)     Hyperlipidemia     Centrilobular emphysema (H)     Anisocoria     OAB (overactive bladder)     Factor 5 Leiden mutation, heterozygous (H)     Family history of blood disease     Bladder incontinence     Physical deconditioning     Cardiomyopathy, idiopathic (H)     Depression with anxiety     Osteoarthritis of hip     Herniated intervertebral disc     Past Medical History:   Diagnosis Date     Arrhythmia      Breast cancer (H) 2017     CHF (congestive heart failure) (H)      COPD (chronic obstructive pulmonary disease) (H)      Coronary artery disease      GERD (gastroesophageal reflux  disease)      Hx of radiation therapy 2017     Hyperlipidemia      Hypertension      Idiopathic cardiomyopathy (H)        Past Social History     Reviewed, and she  reports that she quit smoking about 13 years ago. She quit smokeless tobacco use about 16 years ago. She reports that she does not drink alcohol or use drugs.    Family History     Reviewed, and family history includes Breast cancer in her maternal aunt; Osteoporosis in an other family member; Prostate cancer in her brother and maternal uncle.    Medication List   Post Discharge Medication Reconciliation Status: discharge medications reconciled, continue medications without change   Current Outpatient Medications on File Prior to Visit   Medication Sig Dispense Refill     acetaminophen (TYLENOL) 325 MG tablet Take 2 tablets (650 mg total) by mouth 3 (three) times a day. (Patient taking differently: Take 650 mg by mouth 4 (four) times a day. )  0     ADVAIR DISKUS 500-50 mcg/dose DISKUS USE 1 INHALATION TWICE A DAY 60 each 11     albuterol (PROAIR HFA;PROVENTIL HFA;VENTOLIN HFA) 90 mcg/actuation inhaler Inhale 2 puffs every 4 (four) hours as needed for wheezing. 1 Inhaler 0     ascorbic acid, vitamin C, (VITAMIN C) 1000 MG tablet Take 1,000 mg by mouth daily. Airborne 1 tab daily       aspirin 81 MG EC tablet Take 81 mg by mouth daily.       azelastine (ASTELIN) 137 mcg (0.1 %) nasal spray 2 sprays into each nostril daily.       benzonatate (TESSALON) 200 MG capsule Take 1 capsule (200 mg total) by mouth 3 (three) times a day as needed for cough. 90 capsule 2     calcitonin, salmon, (MIACALCIN) 200 unit/actuation nasal spray Apply 1 spray into alternating nostrils daily for 14 days.  0     celecoxib (CELEBREX) 200 MG capsule Take 200 mg by mouth 2 (two) times a day.       chlorhexidine (PERIDEX) 0.12 % solution Apply 15 mL to the mouth or throat at bedtime as needed.        cholecalciferol, vitamin D3, 1,000 unit tablet Take 1,000 Units by mouth daily.        diclofenac sodium (VOLTAREN) 1 % Gel Apply 4 g to area of pain in low back 4 times daily  0     EPINEPHrine (EPIPEN/ADRENACLICK/AUVI-Q) 0.3 mg/0.3 mL injection Inject 0.3 mg into the shoulder, thigh, or buttocks.       famotidine (PEPCID) 20 MG tablet Take 20 mg by mouth daily.       fluticasone (FLONASE) 50 mcg/actuation nasal spray Apply 1 spray into each nostril as needed.        furosemide (LASIX) 40 MG tablet Take 1 tablet (40 mg total) by mouth 2 (two) times a day at 9am and 6pm.  0     gabapentin (NEURONTIN) 300 MG capsule Take 2 capsules (600 mg total) by mouth 2 (two) times a day. 360 capsule 2     HYDROcodone-acetaminophen 5-325 mg per tablet Take 1 tablet by mouth every 4 (four) hours as needed. 90 tablet 0     ipratropium (ATROVENT HFA) 17 mcg/actuation inhaler Inhale 2 puffs every 6 (six) hours. 3 Inhaler 3     ipratropium-albuterol (DUO-NEB) 0.5-2.5 mg/3 mL nebulizer 1 neb 4 times a day until seen by primary and then as needed or as directed 60 vial 1     lidocaine (XYLOCAINE) 5 % ointment Apply to area of pain in low back 4 times daily 35.44 g 0     losartan (COZAAR) 50 MG tablet TAKE 1 TABLET DAILY 90 tablet 3     melatonin 3 mg Tab tablet Take 1 tablet (3 mg total) by mouth at bedtime.  0     methocarbamoL (ROBAXIN) 750 MG tablet Take 1 tablet (750 mg total) by mouth every 6 (six) hours for 14 days.  0     metoprolol succinate (TOPROL-XL) 50 MG 24 hr tablet Take 1.5 tablets (75 mg total) by mouth daily. 135 tablet 3     mirabegron (MYRBETRIQ) 50 mg Tb24 ER tablet Take 1 tablet (50 mg total) by mouth daily. 90 tablet 3     omalizumab (XOLAIR) 150 mg/mL Syrg injection Inject 300 mg under the skin every 14 (fourteen) days.        pantoprazole (PROTONIX) 40 MG tablet Take 1 tablet (40 mg total) by mouth daily. 90 tablet 4     polyethylene glycol (MIRALAX) 17 gram packet Take 1 packet (17 g total) by mouth daily as needed.  0     QUEtiapine (SEROQUEL) 25 MG tablet Take 0.5 tablets (12.5 mg total) by  mouth 3 (three) times a day as needed (Agitation that is not redirectable.).  0     rivaroxaban ANTICOAGULANT (XARELTO) 20 mg tablet Take 20 mg by mouth at bedtime.       senna-docusate (PERICOLACE) 8.6-50 mg tablet Take 1 tablet by mouth daily as needed for constipation.  0     simvastatin (ZOCOR) 40 MG tablet Take 1 tablet (40 mg total) by mouth at bedtime. 90 tablet 3     sodium chloride (OCEAN) 0.65 % nasal spray 2 sprays into each nostril as needed.       solifenacin (VESICARE) 5 MG tablet Take 1 tablet (5 mg total) by mouth at bedtime. 90 tablet 3     tamoxifen (NOLVADEX) 20 MG tablet TAKE 1 TABLET DAILY 90 tablet 4     traZODone (DESYREL) 50 MG tablet TAKE 1 TABLET(50 MG) BY MOUTH AT BEDTIME AS NEEDED 90 tablet 2     triamcinolone (KENALOG) 0.1 % cream Apply to rash on arms twice daily for one week 30 g 0     triamcinolone (KENALOG) 0.5 % ointment Apply o foot twice a day 30 g 1     venlafaxine (EFFEXOR-XR) 150 MG 24 hr capsule TAKE 1 CAPSULE DAILY 90 capsule 4     Current Facility-Administered Medications on File Prior to Visit   Medication Dose Route Frequency Provider Last Rate Last Dose     denosumab 60 mg (PROLIA 60 mg/ml)  60 mg Subcutaneous Q6 Months Suzanna Shane MD   60 mg at 02/04/20 1352       Allergies     Allergies   Allergen Reactions     Sulfa (Sulfonamide Antibiotics) Rash     Headaches and dizziness.     Homeopathic Drugs Unknown and Other (See Comments)     Comment: runny nose, Comment: runny nose     Mold Extracts      Morphine (Pf)      hallucinate     Lisinopril Cough, Unknown and Itching     Comment: cough, Comment: cough     Sulfacetamide Sodium Rash       Review of Systems   A comprehensive review of 14 systems was done. Pertinent findings noted here and in history of present illness. All the rest negative.  Constitutional: Negative.  Negative for fever, chills, she has activity change, appetite change and fatigue.   HENT: Negative for congestion and facial swelling.    Eyes:  Negative for photophobia, redness and visual disturbance.   Respiratory: Negative for cough and chest tightness.    Cardiovascular: Negative for chest pain, palpitations and leg swelling.   Gastrointestinal: Negative for nausea, diarrhea, constipation, blood in stool and abdominal distention.   Genitourinary: Negative.    Musculoskeletal: Negative.  Pain in her low back; today reporting intractable muscle spasms which are not improving  Has piriformis pain  Skin: Negative.    Neurological: Negative for dizziness, tremors, syncope, weakness, light-headedness and headaches.   Hematological: Does not bruise/bleed easily.   Psychiatric/Behavioral: Negative.  Significant distress due to pain      Physical Exam     Recent Vitals 10/24/2020   Height -   Weight -   BSA (m2) -   /88   Pulse 85   Temp 97.5   Temp src 1   SpO2 92   Some recent data might be hidden     Currently on 3 L of oxygen by nasal cannula wt 150lb  Constitutional: Oriented to person, place, and time and appears well-developed.   HEENT:  Normocephalic and atraumatic.  Eyes: Conjunctivae and EOM are normal. Pupils are equal, round, and reactive to light. No discharge.  No scleral icterus. Nose normal. Mouth/Throat: Oropharynx is clear and moist. No oropharyngeal exudate.    NECK: Normal range of motion. Neck supple. No JVD present. No tracheal deviation present. No thyromegaly present.   CARDIOVASCULAR: Normal rate, regular rhythm and intact distal pulses.  Exam reveals no gallop and no friction rub.  Systolic murmur present.  PULMONARY: Effort normal and breath sounds normal. No respiratory distress.No Wheezing or rales.  Course breath sounds heard bilaterally  Wet cough noted  ABDOMEN: Soft. Bowel sounds are normal. No distension and no mass.  There is no tenderness. There is no rebound and no guarding. No HSM.  MUSCULOSKELETAL: Normal range of motion. No edema and no tenderness. Mild kyphosis,  tenderness  to palpation on her lower thoracic  spine  Mobility is limited.  LYMPH NODES: Has no cervical, supraclavicular, axillary and groin adenopathy.   NEUROLOGICAL: Alert and oriented to person, place, and time. No cranial nerve deficit.  Normal muscle tone. Coordination normal.   GENITOURINARY: Deferred exam.  SKIN: Skin is warm and dry. No rash noted. No erythema. No pallor.   EXTREMITIES: No cyanosis, no clubbing, no edema. No Deformity.  PSYCHIATRIC: Normal mood, affect and behavior.  High anxiety due to pain      Lab Results     Recent Results (from the past 240 hour(s))   Basic Metabolic Panel    Collection Time: 10/26/20  9:55 AM   Result Value Ref Range    Sodium 141 136 - 145 mmol/L    Potassium 4.7 3.5 - 5.0 mmol/L    Chloride 106 98 - 107 mmol/L    CO2 14 (L) 22 - 31 mmol/L    Anion Gap, Calculation 21 (H) 5 - 18 mmol/L    Glucose 91 70 - 125 mg/dL    Calcium 10.0 8.5 - 10.5 mg/dL    BUN 24 8 - 28 mg/dL    Creatinine 1.41 (H) 0.60 - 1.10 mg/dL    GFR MDRD Af Amer 44 (L) >60 mL/min/1.73m2    GFR MDRD Non Af Amer 36 (L) >60 mL/min/1.73m2   Calcium    Collection Time: 10/26/20  9:55 AM   Result Value Ref Range    Calcium 10.0 8.5 - 10.5 mg/dL   Magnesium    Collection Time: 10/26/20  9:55 AM   Result Value Ref Range    Magnesium 2.1 1.8 - 2.6 mg/dL   Phosphorus    Collection Time: 10/26/20  9:55 AM   Result Value Ref Range    Phosphorus 4.2 2.5 - 4.5 mg/dL   HM2(CBC w/o Differential)    Collection Time: 10/26/20  9:55 AM   Result Value Ref Range    WBC 10.8 4.0 - 11.0 thou/uL    RBC 4.90 3.80 - 5.40 mill/uL    Hemoglobin 14.5 12.0 - 16.0 g/dL    Hematocrit 45.7 35.0 - 47.0 %    MCV 93 80 - 100 fL    MCH 29.6 27.0 - 34.0 pg    MCHC 31.7 (L) 32.0 - 36.0 g/dL    RDW 13.8 11.0 - 14.5 %    Platelets 185 140 - 440 thou/uL    MPV 10.2 8.5 - 12.5 fL   COVID-19 Virus PCR MRF    Collection Time: 10/27/20  8:00 AM    Specimen: Respiratory   Result Value Ref Range    COVID-19 VIRUS SPECIMEN SOURCE Nasopharyngeal     2019-nCOV Not Detected    COVID-19 Virus PCR  MRF    Collection Time: 11/03/20  8:00 AM    Specimen: Nasopharyngeal Swab; Respiratory   Result Value Ref Range    COVID-19 VIRUS SPECIMEN SOURCE Nasopharyngeal     2019-nCOV Not Detected    Basic Metabolic Panel    Collection Time: 11/04/20  7:02 AM   Result Value Ref Range    Sodium 140 136 - 145 mmol/L    Potassium 4.0 3.5 - 5.0 mmol/L    Chloride 105 98 - 107 mmol/L    CO2 26 22 - 31 mmol/L    Anion Gap, Calculation 9 5 - 18 mmol/L    Glucose 77 70 - 125 mg/dL    Calcium 9.1 8.5 - 10.5 mg/dL    BUN 34 (H) 8 - 28 mg/dL    Creatinine 1.46 (H) 0.60 - 1.10 mg/dL    GFR MDRD Af Amer 42 (L) >60 mL/min/1.73m2    GFR MDRD Non Af Amer 35 (L) >60 mL/min/1.73m2                  VASHTI Sanchez

## 2021-06-12 NOTE — PROGRESS NOTES
Radiation Treatment Summary    Patient: Irene León   MRN: [id number]  : 1945  Care Provider: Andra Gonzales    Date of Service: 2017      HISTORY: Irene León was treated with 3D conformal radiation therapy left breast  12:00 ER/GA+ HER-2 [id number] grade I IDC    SITE TREATED: left breast  TOTAL DOSE: 4256 cGy  NUMBER OF FRACTIONS: 16  DATES COMPLETED: 2017  CONCURRENT CHEMOTHERAPY: No  ADJUVANT THERAPY:Yes: TBD by Dr.. Daniela Bonilla tolerated the treatment without unexpected side effects.     PLAN: Discharge instructions were given and Irene knows to call if questions/issues arise. Irene will be seen in f/u in 4 weeks without a scan.       Signed by: Andra Gonzales MD

## 2021-06-12 NOTE — TELEPHONE ENCOUNTER
RN cannot approve Refill Request    RN can NOT refill this medication Protocol failed and NO refill given. Last office visit: 2/14/2020 Pankaj Montanez MD Last Physical: Visit date not found Last MTM visit: Visit date not found Last visit same specialty: 3/11/2020 Aakash Flores CNP.  Next visit within 3 mo: Visit date not found  Next physical within 3 mo: Visit date not found      Kirstie Craig, [facility] Triage/Med Refill 10/9/2020    Requested Prescriptions   Pending Prescriptions Disp Refills     ADVAIR DISKUS 500-50 mcg/dose DISKUS [Pharmacy Med Name: ADVAIR DISKUS 60'S 500/50MCG] 60 each 11     Sig: USE 1 INHALATION TWICE A DAY       There is no refill protocol information for this order

## 2021-06-12 NOTE — PROGRESS NOTES
RADIATION ONCOLOGY WEEKLY TREATMENT VISIT NOTE      Assessment / Impression       1. Malignant neoplasm of central portion of left female breast       Malignant neoplasm of central portion of left female breast    Staging form: Breast, AJCC 7th Edition    - Pathologic stage from 7/26/2017: Stage IA (T1b, N0, cM0) - Signed by Devon Suarez MD on 8/1/2017   Tolerating radiation therapy well.  All questions and concerns addressed.      Plan:     Continue radiation treatment as prescribed.  Radiation: Site: Left Breast  Stereotactic Radiosurgery: No  Stereotactic Radiosurgery: No  Concurrent Therapy: No  Today's Dose: 2660  Total Dose for Breast: 4256  Today's Fraction/Total Fraction Breast: 10/16        Subjective:      HPI: Irene León is a 72 y.o. female with    1. Malignant neoplasm of central portion of left female breast         The following portions of the patient's history were reviewed and updated as appropriate: allergies, current medications, past family history, past medical history, past social history, past surgical history and problem list.      Objective:     Exam:     Vitals:    08/30/17 0956   BP: 135/82   Pulse: 60   Temp: 97.8  F (36.6  C)   TempSrc: Oral   SpO2: 96%   Weight: 150 lb 12.8 oz (68.4 kg)   Height: 5' (1.524 m)       Wt Readings from Last 8 Encounters:   08/30/17 150 lb 12.8 oz (68.4 kg)   08/23/17 151 lb 6.4 oz (68.7 kg)   08/01/17 153 lb (69.4 kg)   07/27/17 153 lb (69.4 kg)   06/06/17 155 lb (70.3 kg)   03/01/17 152 lb (68.9 kg)   02/28/17 152 lb (68.9 kg)   12/29/16 152 lb (68.9 kg)       General: Alert and oriented, in no acute distress  Irene has mild Erythema.     Treatment Summary to Date    [facility]ria chart and setup information reviewed    Andra Gonzales MD

## 2021-06-12 NOTE — PROGRESS NOTES
[facility] Admission note      Patient: Irene León  MRN: [id number]  Date of Service: 10/28/2020      [facility] [[id number]]  Reason for Visit     Chief Complaint   Patient presents with     Review Of Multiple Medical Conditions   F/U PAIN /MUSCLE SPASMS    Code Status     FULL CODE    Assessment     -Acute low back pain patient not reporting any improvement with her current medication regimen  -T12 compression fracture  -ANATOLIY with elevated creatinine 1.4 in the TCU  -Osteoporosis restarted on Prolia  -Pain management no longer on narcotics; no discharge on narcotics including Percocet  -Acute metabolic encephalopathy/delirium felt to be secondary to opioid medication  -CHF with reduced ejection fraction acute exacerbation  -- ANATOLIY /cardiorenal syndrome  -History of COPD and asthma  -Acute on chronic hypoxic respiratory failure ON 3L BY NC  -Peripheral arterial disease  -History of factor V Leiden and occipital stroke on Xarelto  -History of breast cancer  -Concern for mild dementia with baseline cognitive impairment  Slums 13/30  -Generalized weakness      Plan     Patient has been admitted to the TCU for strengthening and rehab  She was admitted to the hospital with a T12 compression fracture and back pain.  Seen by multiple specialist including orthopedics cardiology as well as pain management  Orthopedics saw her and will discharge her with a TLSO brace in place.  She is compliant with her bracing.  Unfortunately pain remains her biggest challenge today.  She has been afraid to move.  She has been experiencing muscle spasms.  She received her scheduled Tylenol.  Celebrex twice daily 200 mg has been added to her regimen.  Nursing also gave her her Percocet.  She is also on scheduled muscle relaxers Robaxin which she received on time.  Topical Voltaren gel was applied on her back with no improvement she also has an ice pack.  Unfortunately patient is profoundly uncomfortable  with muscle spasms and reports a high pain level currently.  She is compliant with her bracing.  Plan is to discontinue her Robaxin and switch her to Vistaril 25 mg 4 times daily.  Also will give her an additional 50 mg of Vistaril stat.  Hoping that will help her with muscle relaxation.  Continue to monitor pain  She is currently being limited in all her activities because of severe pain.  Oxygen saturation is within normal limits on 3 L with no taper attempted at this is her home regimen  Recheck slums is 23/30    History     Patient is a very pleasant 75 y.o. female who is admitted to TCU.  Patient presented to the hospital with acute low back pain.  She was admitted in the hospital.  Imaging revealed acute/subacute inferior endplate compression fracture of T12 with 50% loss of height.  Orthopedics was consulted.  She has been discharged weightbearing as tolerated with a TLSO brace to be worn all day  Outpatient follow-up for consideration of kyphoplasty recommended in 2 to 3 weeks.  She has underlying history of osteoporosis and her Prolia will be restarted  Pain management reviewed with the patient she is on multiple medications including Tylenol with gabapentin.  Also given Robaxin and Lidoderm patch.  Calcitonin added for bone healing.  It has been recommended that oxycodone and hydromorphone be avoided due to delirium.  While in the hospital she developed hospital associated delirium felt to be related to opioid medications.  Unfortunately pain remains her biggest challenge.  Today she is reporting 10 out of 10 pain with severe muscle spasms this has been ongoing since last night  Nursing did give her her scheduled Tylenol muscle relaxer Celebrex as well as her topical pain gels were applied on her back with no improvement she was given an ice pack with no improvement.  She also has a TLSO on.  She is moAning in pain and reporting that her pain is not improving.  Unfortunately we are limited in our ability  to push stronger pain pills because of severe cognitive impairment that she experienced while she was on narcotics  Those were discontinued with improvement in cognitive status  She also has congestive heart failure.  She had an acute exacerbation felt to be secondary to medication noncompliance.  Echocardiogram revealed an acute ejection fraction of 30%.  She has been discharged on Lasix 40 mg daily.  2 times a day  She will require weight monitoring and management  Edema concerns are minimal today and weights appear to be stable.  She has had significant improvement in her cognitive status also  R/C LINDAUMS 23/30      Past Medical History     Active Ambulatory (Non-Hospital) Problems    Diagnosis     Pain due to fracture     High risk medication use     Heart failure exacerbated by sotalol (H)     T12 compression fracture (H)     Acute on chronic diastolic congestive heart failure (H)     Metabolic encephalopathy     Low back pain     Compression fracture of L1 vertebra, sequela     Chronic respiratory failure with hypoxia (H)     Chronic obstructive pulmonary disease, unspecified COPD type (H)     Chronic systolic congestive heart failure (H)     Hypoxia     S/P hip replacement, left     Hip pain, left     Acute hypoxemic respiratory failure (H)     Chest pain     COPD exacerbation (H)     Herpes zoster without complication     Chest wall pain     Benign essential hypertension     Dyslipidemia     Acute respiratory failure with hypoxia (H)     Pulsatile tinnitus of both ears     Hot flashes due to tamoxifen     Pleural nodules     Hot flashes, menopausal     Malignant neoplasm of central portion of left female breast (H)     CKD (chronic kidney disease) stage 3, GFR 30-59 ml/min     Dyspnea     Insomnia     Leg pain, left     Femur fracture (H)     GERD (gastroesophageal reflux disease)     Post menopausal syndrome     OP (osteoporosis)     Hypertension     Pulmonary emphysema, unspecified emphysema type (H)      Hyperlipidemia     Centrilobular emphysema (H)     Anisocoria     OAB (overactive bladder)     Factor 5 Leiden mutation, heterozygous (H)     Family history of blood disease     Bladder incontinence     Physical deconditioning     Cardiomyopathy, idiopathic (H)     Depression with anxiety     Osteoarthritis of hip     Herniated intervertebral disc     Past Medical History:   Diagnosis Date     Arrhythmia      Breast cancer (H) 2017     CHF (congestive heart failure) (H)      COPD (chronic obstructive pulmonary disease) (H)      Coronary artery disease      GERD (gastroesophageal reflux disease)      Hx of radiation therapy 2017     Hyperlipidemia      Hypertension      Idiopathic cardiomyopathy (H)        Past Social History     Reviewed, and she  reports that she quit smoking about 13 years ago. She quit smokeless tobacco use about 16 years ago. She reports that she does not drink alcohol or use drugs.    Family History     Reviewed, and family history includes Breast cancer in her maternal aunt; Osteoporosis in an other family member; Prostate cancer in her brother and maternal uncle.    Medication List   Post Discharge Medication Reconciliation Status: discharge medications reconciled, continue medications without change   Current Outpatient Medications on File Prior to Visit   Medication Sig Dispense Refill     acetaminophen (TYLENOL) 325 MG tablet Take 2 tablets (650 mg total) by mouth 3 (three) times a day. (Patient taking differently: Take 650 mg by mouth 4 (four) times a day. )  0     ADVAIR DISKUS 500-50 mcg/dose DISKUS USE 1 INHALATION TWICE A DAY 60 each 11     albuterol (PROAIR HFA;PROVENTIL HFA;VENTOLIN HFA) 90 mcg/actuation inhaler Inhale 2 puffs every 4 (four) hours as needed for wheezing. 1 Inhaler 0     ascorbic acid, vitamin C, (VITAMIN C) 1000 MG tablet Take 1,000 mg by mouth daily. Airborne 1 tab daily       aspirin 81 MG EC tablet Take 81 mg by mouth daily.       azelastine (ASTELIN) 137 mcg  (0.1 %) nasal spray 2 sprays into each nostril daily.       benzonatate (TESSALON) 200 MG capsule Take 1 capsule (200 mg total) by mouth 3 (three) times a day as needed for cough. 90 capsule 2     calcitonin, salmon, (MIACALCIN) 200 unit/actuation nasal spray Apply 1 spray into alternating nostrils daily for 14 days.  0     celecoxib (CELEBREX) 200 MG capsule Take 200 mg by mouth 2 (two) times a day.       chlorhexidine (PERIDEX) 0.12 % solution Apply 15 mL to the mouth or throat at bedtime as needed.        cholecalciferol, vitamin D3, 1,000 unit tablet Take 1,000 Units by mouth daily.       diclofenac sodium (VOLTAREN) 1 % Gel Apply 4 g to area of pain in low back 4 times daily  0     EPINEPHrine (EPIPEN/ADRENACLICK/AUVI-Q) 0.3 mg/0.3 mL injection Inject 0.3 mg into the shoulder, thigh, or buttocks.       famotidine (PEPCID) 20 MG tablet Take 20 mg by mouth daily.       fluticasone (FLONASE) 50 mcg/actuation nasal spray Apply 1 spray into each nostril as needed.        furosemide (LASIX) 40 MG tablet Take 1 tablet (40 mg total) by mouth 2 (two) times a day at 9am and 6pm.  0     gabapentin (NEURONTIN) 300 MG capsule Take 2 capsules (600 mg total) by mouth 2 (two) times a day. 360 capsule 2     HYDROcodone-acetaminophen 5-325 mg per tablet Take 1 tablet by mouth every 4 (four) hours as needed. 20 tablet 0     ipratropium (ATROVENT HFA) 17 mcg/actuation inhaler Inhale 2 puffs every 6 (six) hours. 3 Inhaler 3     ipratropium-albuterol (DUO-NEB) 0.5-2.5 mg/3 mL nebulizer 1 neb 4 times a day until seen by primary and then as needed or as directed 60 vial 1     lidocaine (XYLOCAINE) 5 % ointment Apply to area of pain in low back 4 times daily 35.44 g 0     losartan (COZAAR) 50 MG tablet TAKE 1 TABLET DAILY 90 tablet 3     melatonin 3 mg Tab tablet Take 1 tablet (3 mg total) by mouth at bedtime.  0     methocarbamoL (ROBAXIN) 750 MG tablet Take 1 tablet (750 mg total) by mouth every 6 (six) hours for 14 days.  0      metoprolol succinate (TOPROL-XL) 50 MG 24 hr tablet Take 1.5 tablets (75 mg total) by mouth daily. 135 tablet 3     mirabegron (MYRBETRIQ) 50 mg Tb24 ER tablet Take 1 tablet (50 mg total) by mouth daily. 90 tablet 3     omalizumab (XOLAIR) 150 mg/mL Syrg injection Inject 300 mg under the skin every 14 (fourteen) days.        pantoprazole (PROTONIX) 40 MG tablet Take 1 tablet (40 mg total) by mouth daily. 90 tablet 4     polyethylene glycol (MIRALAX) 17 gram packet Take 1 packet (17 g total) by mouth daily as needed.  0     QUEtiapine (SEROQUEL) 25 MG tablet Take 0.5 tablets (12.5 mg total) by mouth 3 (three) times a day as needed (Agitation that is not redirectable.).  0     rivaroxaban ANTICOAGULANT (XARELTO) 20 mg tablet Take 20 mg by mouth at bedtime.       senna-docusate (PERICOLACE) 8.6-50 mg tablet Take 1 tablet by mouth daily as needed for constipation.  0     simvastatin (ZOCOR) 40 MG tablet Take 1 tablet (40 mg total) by mouth at bedtime. 90 tablet 3     sodium chloride (OCEAN) 0.65 % nasal spray 2 sprays into each nostril as needed.       solifenacin (VESICARE) 5 MG tablet Take 1 tablet (5 mg total) by mouth at bedtime. 90 tablet 3     tamoxifen (NOLVADEX) 20 MG tablet TAKE 1 TABLET DAILY 90 tablet 4     traZODone (DESYREL) 50 MG tablet TAKE 1 TABLET(50 MG) BY MOUTH AT BEDTIME AS NEEDED 90 tablet 2     triamcinolone (KENALOG) 0.1 % cream Apply to rash on arms twice daily for one week 30 g 0     triamcinolone (KENALOG) 0.5 % ointment Apply o foot twice a day 30 g 1     venlafaxine (EFFEXOR-XR) 150 MG 24 hr capsule TAKE 1 CAPSULE DAILY 90 capsule 4     Current Facility-Administered Medications on File Prior to Visit   Medication Dose Route Frequency Provider Last Rate Last Dose     denosumab 60 mg (PROLIA 60 mg/ml)  60 mg Subcutaneous Q6 Months Suzanna Shane MD   60 mg at 02/04/20 1036       Allergies     Allergies   Allergen Reactions     Sulfa (Sulfonamide Antibiotics) Rash     Headaches and dizziness.      Homeopathic Drugs Unknown and Other (See Comments)     Comment: runny nose, Comment: runny nose     Mold Extracts      Morphine (Pf)      hallucinate     Lisinopril Cough, Unknown and Itching     Comment: cough, Comment: cough     Sulfacetamide Sodium Rash       Review of Systems   A comprehensive review of 14 systems was done. Pertinent findings noted here and in history of present illness. All the rest negative.  Constitutional: Negative.  Negative for fever, chills, she has activity change, appetite change and fatigue.   HENT: Negative for congestion and facial swelling.    Eyes: Negative for photophobia, redness and visual disturbance.   Respiratory: Negative for cough and chest tightness.    Cardiovascular: Negative for chest pain, palpitations and leg swelling.   Gastrointestinal: Negative for nausea, diarrhea, constipation, blood in stool and abdominal distention.   Genitourinary: Negative.    Musculoskeletal: Negative.  Pain in her low back; today reporting intractable muscle spasms which are not improving  Skin: Negative.    Neurological: Negative for dizziness, tremors, syncope, weakness, light-headedness and headaches.   Hematological: Does not bruise/bleed easily.   Psychiatric/Behavioral: Negative.  Significant distress due to pain      Physical Exam     Recent Vitals 10/24/2020   Height -   Weight -   BSA (m2) -   /88   Pulse 85   Temp 97.5   Temp src 1   SpO2 92   Some recent data might be hidden     Currently on 3 L of oxygen by nasal cannula  Constitutional: Oriented to person, place, and time and appears well-developed.   HEENT:  Normocephalic and atraumatic.  Eyes: Conjunctivae and EOM are normal. Pupils are equal, round, and reactive to light. No discharge.  No scleral icterus. Nose normal. Mouth/Throat: Oropharynx is clear and moist. No oropharyngeal exudate.    NECK: Normal range of motion. Neck supple. No JVD present. No tracheal deviation present. No thyromegaly present.    CARDIOVASCULAR: Normal rate, regular rhythm and intact distal pulses.  Exam reveals no gallop and no friction rub.  Systolic murmur present.  PULMONARY: Effort normal and breath sounds normal. No respiratory distress.No Wheezing or rales.  ABDOMEN: Soft. Bowel sounds are normal. No distension and no mass.  There is no tenderness. There is no rebound and no guarding. No HSM.  MUSCULOSKELETAL: Normal range of motion. No edema and no tenderness. Mild kyphosis,  tenderness  to palpation on her lower thoracic spine  Mobility is limited.  LYMPH NODES: Has no cervical, supraclavicular, axillary and groin adenopathy.   NEUROLOGICAL: Alert and oriented to person, place, and time. No cranial nerve deficit.  Normal muscle tone. Coordination normal.   GENITOURINARY: Deferred exam.  SKIN: Skin is warm and dry. No rash noted. No erythema. No pallor.   EXTREMITIES: No cyanosis, no clubbing, no edema. No Deformity.  PSYCHIATRIC: Normal mood, affect and behavior.  High anxiety due to pain      Lab Results     Recent Results (from the past 240 hour(s))   BNP(B-type Natriuretic Peptide)    Collection Time: 10/18/20  6:18 PM   Result Value Ref Range    BNP 2,558 (H) 0 - 137 pg/mL   Potassium    Collection Time: 10/19/20  6:01 AM   Result Value Ref Range    Potassium 3.8 3.5 - 5.0 mmol/L   Magnesium    Collection Time: 10/19/20  6:01 AM   Result Value Ref Range    Magnesium 1.9 1.8 - 2.6 mg/dL   BNP(B-type Natriuretic Peptide)    Collection Time: 10/19/20  6:01 AM   Result Value Ref Range    BNP 2,067 (H) 0 - 137 pg/mL   Magnesium    Collection Time: 10/20/20  6:04 AM   Result Value Ref Range    Magnesium 2.0 1.8 - 2.6 mg/dL   Basic Metabolic Panel    Collection Time: 10/20/20  6:04 AM   Result Value Ref Range    Sodium 137 136 - 145 mmol/L    Potassium 3.3 (L) 3.5 - 5.0 mmol/L    Chloride 98 98 - 107 mmol/L    CO2 29 22 - 31 mmol/L    Anion Gap, Calculation 10 5 - 18 mmol/L    Glucose 87 70 - 125 mg/dL    Calcium 9.1 8.5 - 10.5 mg/dL     BUN 15 8 - 28 mg/dL    Creatinine 1.17 (H) 0.60 - 1.10 mg/dL    GFR MDRD Af Amer 55 (L) >60 mL/min/1.73m2    GFR MDRD Non Af Amer 45 (L) >60 mL/min/1.73m2   Troponin I    Collection Time: 10/20/20 12:01 PM   Result Value Ref Range    Troponin I 0.05 0.00 - 0.29 ng/mL   BNP(B-type Natriuretic Peptide)    Collection Time: 10/20/20 12:01 PM   Result Value Ref Range     (H) 0 - 137 pg/mL   Echo Complete    Collection Time: 10/20/20  3:39 PM   Result Value Ref Range    LV volume diastolic 77.1 46 - 106 cm3    LV volume systolic 41.6 14 - 42 cm3    HR 80 bpm    IVSd 1.14 (!) 0.6 - 0.9 cm    LVIDd 5.0 3.8 - 5.2 cm    LVIDs 3.86 (!) 2.2 - 3.5 cm    LVOT diam 1.8 cm    LVOT mean gradient 1 mmHg    LVOT peak VTI 14.8 cm    LVOT mean carolyn 48.8 cm/s    LVOT peak carolyn 75 cm/s    LVOT peak gradient 2 mmHg    LV PWd 1.12 (!) 0.6 - 0.9 cm    MV E' lat carolyn 6.58 cm/s    MV E' med carolyn 3.29 cm/s    AV mean carolyn 103 cm/s    AV mean gradient 5 mmHg    AV VTI 27.6 cm    AV peak carolyn 155 cm/s    AO root 3.1 cm    AO ascending 2.9 cm    LA size 3.6 cm    LA/AO root ratio 1.16 no units    MV decel slope 3,640 mm/s2    MV decel time 174 ms    MV P 1/2 time 51 ms    MV peak A carolyn 139 cm/s    MV peak E carolyn 63.4 cm/s    TR peak carolyn 408.0 cm/s    BSA 1.76 m2    Hieght 62 in    Weight 2,404 lbs    /64 mmHg    IVS/PW ratio 1.0     TR peak gradent 66.6 mmHg    LV FS 22.8 28 - 44 %    Echo LVEF calculated 46 55 - 75 %    LA volume 58.1 mL    LV mass 215.0 g    AV area 1.4 cm2    AV DIM IND carolyn 0.5     MV area p 1/2 time 4.3 cm2    MV E/A Ratio 0.5     LVOT area 2.54 cm2    LVOT SV 37.6 cm3    AV peak gradient 9.6 mmHg    LV systolic volume index 23.6 8 - 24 cm3/m2    LV diastolic volume index 43.8 29 - 61 cm3/m2    LA volume index 33.0 mL/m2    LV mass index 122.1 g/m2    LV SVi 21.4 ml/m2    MV med E/e' ratio 19.3     MV lat E/e' ratio 9.6     LV CO 3.0 l/min    LV Ci 1.7 l/min/m2    LA area 2 19 cm2    LA area 1 18 cm2    Height 62.0 in     Weight 150 lbs    MV Avg E/e' Ratio 12.8 cm/s    LA length 5.0 cm    AV DIM IND VTI 0.5     Echo LVEF Estimated 40 %   Troponin I    Collection Time: 10/20/20  6:04 PM   Result Value Ref Range    Troponin I 0.06 0.00 - 0.29 ng/mL   Magnesium    Collection Time: 10/21/20  6:37 AM   Result Value Ref Range    Magnesium 2.0 1.8 - 2.6 mg/dL   Basic Metabolic Panel    Collection Time: 10/21/20  6:37 AM   Result Value Ref Range    Sodium 139 136 - 145 mmol/L    Potassium 3.4 (L) 3.5 - 5.0 mmol/L    Chloride 99 98 - 107 mmol/L    CO2 27 22 - 31 mmol/L    Anion Gap, Calculation 13 5 - 18 mmol/L    Glucose 85 70 - 125 mg/dL    Calcium 9.0 8.5 - 10.5 mg/dL    BUN 17 8 - 28 mg/dL    Creatinine 1.12 (H) 0.60 - 1.10 mg/dL    GFR MDRD Af Amer 57 (L) >60 mL/min/1.73m2    GFR MDRD Non Af Amer 47 (L) >60 mL/min/1.73m2   ECG 12 lead with MUSE, prior to first dose of antipsychotics.    Collection Time: 10/21/20  9:42 AM   Result Value Ref Range    SYSTOLIC BLOOD PRESSURE      DIASTOLIC BLOOD PRESSURE      VENTRICULAR RATE 72 BPM    ATRIAL RATE 72 BPM    P-R INTERVAL 134 ms    QRS DURATION 134 ms    Q-T INTERVAL 450 ms    QTC CALCULATION (BEZET) 492 ms    P Axis 80 degrees    R AXIS -78 degrees    T AXIS 106 degrees    MUSE DIAGNOSIS       Sinus rhythm with occasional and consecutive Premature ventricular complexes  Left axis deviation  Non-specific intra-ventricular conduction block  T wave abnormality, consider lateral ischemia  Abnormal ECG  When compared with ECG of 16-OCT-2020 23:38,  Premature ventricular complexes are now Present  Confirmed by JUAN LUCIO, MILADY LOC: ([id number]) on 10/21/2020 2:40:44 PM     Urinalysis-UC if Indicated    Collection Time: 10/21/20 11:07 AM   Result Value Ref Range    Color, UA Straw Colorless, Yellow, Straw, Light Yellow    Clarity, UA Clear Clear    Glucose, UA Negative Negative    Bilirubin, UA Negative Negative    Ketones, UA Negative Negative    Specific Gravity, UA 1.005 1.001 - 1.030     Blood, UA Negative Negative    pH, UA 6.0 4.5 - 8.0    Protein, UA Negative Negative mg/dL    Urobilinogen, UA <2.0 E.U./dL <2.0 E.U./dL, 2.0 E.U./dL    Nitrite, UA Negative Negative    Leukocytes, UA Negative Negative   Potassium    Collection Time: 10/21/20  9:06 PM   Result Value Ref Range    Potassium 3.8 3.5 - 5.0 mmol/L   Potassium - Next AM    Collection Time: 10/22/20  5:53 AM   Result Value Ref Range    Potassium 3.5 3.5 - 5.0 mmol/L   Basic Metabolic Panel    Collection Time: 10/22/20 11:59 AM   Result Value Ref Range    Sodium 137 136 - 145 mmol/L    Potassium 4.3 3.5 - 5.0 mmol/L    Chloride 101 98 - 107 mmol/L    CO2 23 22 - 31 mmol/L    Anion Gap, Calculation 13 5 - 18 mmol/L    Glucose 97 70 - 125 mg/dL    Calcium 9.4 8.5 - 10.5 mg/dL    BUN 17 8 - 28 mg/dL    Creatinine 1.06 0.60 - 1.10 mg/dL    GFR MDRD Af Amer >60 >60 mL/min/1.73m2    GFR MDRD Non Af Amer 51 (L) >60 mL/min/1.73m2   Potassium - Next AM    Collection Time: 10/23/20  6:12 AM   Result Value Ref Range    Potassium 3.7 3.5 - 5.0 mmol/L   Basic Metabolic Panel    Collection Time: 10/24/20  6:08 AM   Result Value Ref Range    Sodium 140 136 - 145 mmol/L    Potassium 4.1 3.5 - 5.0 mmol/L    Chloride 103 98 - 107 mmol/L    CO2 24 22 - 31 mmol/L    Anion Gap, Calculation 13 5 - 18 mmol/L    Glucose 89 70 - 125 mg/dL    Calcium 9.9 8.5 - 10.5 mg/dL    BUN 18 8 - 28 mg/dL    Creatinine 1.05 0.60 - 1.10 mg/dL    GFR MDRD Af Amer >60 >60 mL/min/1.73m2    GFR MDRD Non Af Amer 51 (L) >60 mL/min/1.73m2   Basic Metabolic Panel    Collection Time: 10/26/20  9:55 AM   Result Value Ref Range    Sodium 141 136 - 145 mmol/L    Potassium 4.7 3.5 - 5.0 mmol/L    Chloride 106 98 - 107 mmol/L    CO2 14 (L) 22 - 31 mmol/L    Anion Gap, Calculation 21 (H) 5 - 18 mmol/L    Glucose 91 70 - 125 mg/dL    Calcium 10.0 8.5 - 10.5 mg/dL    BUN 24 8 - 28 mg/dL    Creatinine 1.41 (H) 0.60 - 1.10 mg/dL    GFR MDRD Af Amer 44 (L) >60 mL/min/1.73m2    GFR MDRD Non Af Amer  36 (L) >60 mL/min/1.73m2   Calcium    Collection Time: 10/26/20  9:55 AM   Result Value Ref Range    Calcium 10.0 8.5 - 10.5 mg/dL   Magnesium    Collection Time: 10/26/20  9:55 AM   Result Value Ref Range    Magnesium 2.1 1.8 - 2.6 mg/dL   Phosphorus    Collection Time: 10/26/20  9:55 AM   Result Value Ref Range    Phosphorus 4.2 2.5 - 4.5 mg/dL   HM2(CBC w/o Differential)    Collection Time: 10/26/20  9:55 AM   Result Value Ref Range    WBC 10.8 4.0 - 11.0 thou/uL    RBC 4.90 3.80 - 5.40 mill/uL    Hemoglobin 14.5 12.0 - 16.0 g/dL    Hematocrit 45.7 35.0 - 47.0 %    MCV 93 80 - 100 fL    MCH 29.6 27.0 - 34.0 pg    MCHC 31.7 (L) 32.0 - 36.0 g/dL    RDW 13.8 11.0 - 14.5 %    Platelets 185 140 - 440 thou/uL    MPV 10.2 8.5 - 12.5 fL                  VASHTI Sanchez

## 2021-06-12 NOTE — PROGRESS NOTES
RADIATION ONCOLOGY WEEKLY TREATMENT VISIT NOTE      Assessment / Impression       1. Malignant neoplasm of central portion of left female breast       Malignant neoplasm of central portion of left female breast    Staging form: Breast, AJCC 7th Edition    - Pathologic stage from 7/26/2017: Stage IA (T1b, N0, cM0) - Signed by Devon Suarez MD on 8/1/2017   Tolerating radiation therapy well.  All questions and concerns addressed.      Plan:     Continue radiation treatment as prescribed.  Radiation: Site: Left Breast   Stereotactic Radiosurgery: No  Stereotactic Radiosurgery: No  Concurrent Therapy: No  Today's Dose: 3724  Total Dose for Breast: 4256  Today's Fraction/Total Fraction Breast: 14/16        Subjective:      HPI: Irene León is a 72 y.o. female with    1. Malignant neoplasm of central portion of left female breast         The following portions of the patient's history were reviewed and updated as appropriate: allergies, current medications, past family history, past medical history, past social history, past surgical history and problem list.      Objective:     Exam:     Vitals:    09/06/17 1006   BP: 134/71   Pulse: (!) 59   Temp: 97.6  F (36.4  C)   TempSrc: Oral   SpO2: 96%   Weight: 152 lb 8 oz (69.2 kg)       Wt Readings from Last 8 Encounters:   09/06/17 152 lb 8 oz (69.2 kg)   08/30/17 150 lb 12.8 oz (68.4 kg)   08/23/17 151 lb 6.4 oz (68.7 kg)   08/01/17 153 lb (69.4 kg)   07/27/17 153 lb (69.4 kg)   06/06/17 155 lb (70.3 kg)   03/01/17 152 lb (68.9 kg)   02/28/17 152 lb (68.9 kg)       General: Alert and oriented, in no acute distress  Irene has mild Erythema.    Treatment Summary to Date    Aria chart and setup information reviewed    Andra Gonzales MD

## 2021-06-12 NOTE — PROGRESS NOTES
[facility] Admission note      Patient: Irene León  MRN: [id number]  Date of Service: 11/2/2020      [facility] [[id number]]  Reason for Visit     Chief Complaint   Patient presents with     Review Of Multiple Medical Conditions   F/U PAIN /MUSCLE SPASMS/ chf    Code Status     FULL CODE    Assessment     -Acute CHF exacerbation with a weight gain of more than 5 pounds noted in the TCU with persistent cough and shortness of breath  -Acute low back pain patient not reporting any improvement with her current medication regimen  -Mood disorder family suspecting worsening depression which persists as narcotic dependence for chronic pain  -T12 compression fracture  -ANATOLIY with elevated creatinine 1.4 in the TCU  -Osteoporosis restarted on Prolia  -Pain management no longer on narcotics; no discharge on narcotics including Percocet  -Acute metabolic encephalopathy/delirium felt to be secondary to opioid medication  -CHF with reduced ejection fraction acute exacerbation  -- ANATOLIY /cardiorenal syndrome  -History of COPD and asthma  -Acute on chronic hypoxic respiratory failure ON 3L BY NC  -Peripheral arterial disease  -History of factor V Leiden and occipital stroke on Xarelto  -History of breast cancer  -Concern for mild dementia with baseline cognitive impairment  Slums 13/30  -Generalized weakness      Plan     Patient has been admitted to the TCU for strengthening and rehab  She was admitted to the hospital with a T12 compression fracture and back pain.  She was seen at the request of nursing and subsequently her daughter was updated.  Several concerns were reviewed.  Pain management reviewed.  She continues to request optimization of her pain management.  However based on her pain scale it seems that post narcotic administration her pain has improved and she is reporting a pain of either 1-3.  Family has been calling concerned about prior history of narcotic addiction.  Patient  educated that she is not a candidate for high doses of narcotics.  She is actually doing better with addition of Vistaril with no muscle spasms reported since then.  Continue compliance with TLSO.  Continue PT OT.  Continue topical Voltaren gel as well as Lidoderm gel.  Orthopedic referral given for her piriformis syndrome for consideration of a topical injection.  In addition we will continue with her Celebrex as well as with her calcitonin nasal spray with no stop date as yet.  Follow-up with Ortho also requested for consideration of kyphoplasty.  She is also reporting CHF exacerbation with a weight gain of more than 5 pounds.  Family is calling upset by her daily weights have not been pulled through  Reorder daily weights for her.  Due to worsening pulmonary edema and respiratory distress discontinue her Lasix regimen.  Start her on Lasix 80 mg in the morning and 40 mg in the evening.  Monitor weights closely.  Recheck BMP in 2 days.  Monitor for any signs of respiratory distress and consider adding an x-ray chest.  Also reviewed concerns with her daughter including concerns about cognition.  Daughter continues to believe that her mother has significant impairment in short-term memory.  Her slums was 23/30.  We also talked about her ability to come and visit her mother in the nursing home on a compassionate ground as she feels her care is being ignored.  She has several other concerns about the nursing home which will be addressed with the manager including not being given daily updates and other nursing concerns.  We also talked about the fact that the daughter believes that her mother has depression we talked about Effexor.  She is already on 150 mg which is a moderate dose.  Daughter believes she has narcotic dependence in the past and wants her to taper off.  It was agreed that next visit I will talk to her about discontinuing her or increasing the interval of her narcotics.  After reviewing with daughter we  will not change the dose of her antidepressant.  Discharge planning reviewed with her her  is at home and he has Alzheimer's dementia and daughter is concerned about her compliance with oxygen and compliance with medications if she comes home.  Concerns about medical power of  decision making was also reviewed.  Total time spent is 40 minutes with more than 30 minutes spent with the daughter and the patient reviewing the multiple care concerns as outlined in the note above including pain management cognition weaning of narcotics depression and discharge planning  Daughter will also petition the nursing home to see if they will let her come inside the facility to be with her mother as an essential caregiver at least once a week on a compassionate ground      History     Patient is a very pleasant 75 y.o. female who is admitted to TCU.  Patient presented to the hospital with acute low back pain.  She was admitted in the hospital.  Imaging revealed acute/subacute inferior endplate compression fracture of T12 with 50% loss of height.  Orthopedics was consulted.  She has been discharged weightbearing as tolerated with a TLSO brace to be worn all day  Outpatient follow-up for consideration of kyphoplasty recommended in 2 to 3 weeks.  She has underlying history of osteoporosis and her Prolia will be restarted  Pain management reviewed with the patient she is on multiple medications including Tylenol with gabapentin.  Pain management reviewed with both patient and her daughter.  We reviewed her various pain management options including the fact that she is on as needed narcotics because she could not tolerate high doses of narcotics.  Patient is requesting that medications be scheduled.  Her daughter also has been calling concerned that her mother has a prior history of narcotic dependence.  It was agreed that the patient may not be rating her pain as accurately and may be beneficial for her to taper.  There  is also concern about CHF exacerbation with patient reporting progressive weight gain she is now has a wet sounding cough.  Daughter has been calling very concerned that her care concerns have not been addressed.  Cognition was also reviewed especially as daughter strongly believes that her mother is having a decline in her cognition which is not being picked up accurately by testing    R/C SLUMS 23/30      Past Medical History     Active Ambulatory (Non-Hospital) Problems    Diagnosis     Pain due to fracture     High risk medication use     Heart failure exacerbated by sotalol (H)     T12 compression fracture (H)     Acute on chronic diastolic congestive heart failure (H)     Metabolic encephalopathy     Low back pain     Compression fracture of L1 vertebra, sequela     Chronic respiratory failure with hypoxia (H)     Chronic obstructive pulmonary disease, unspecified COPD type (H)     Chronic systolic congestive heart failure (H)     Hypoxia     S/P hip replacement, left     Hip pain, left     Acute hypoxemic respiratory failure (H)     Chest pain     COPD exacerbation (H)     Herpes zoster without complication     Chest wall pain     Benign essential hypertension     Dyslipidemia     Acute respiratory failure with hypoxia (H)     Pulsatile tinnitus of both ears     Hot flashes due to tamoxifen     Pleural nodules     Hot flashes, menopausal     Malignant neoplasm of central portion of left female breast (H)     CKD (chronic kidney disease) stage 3, GFR 30-59 ml/min     Dyspnea     Insomnia     Leg pain, left     Femur fracture (H)     GERD (gastroesophageal reflux disease)     Post menopausal syndrome     OP (osteoporosis)     Hypertension     Pulmonary emphysema, unspecified emphysema type (H)     Hyperlipidemia     Centrilobular emphysema (H)     Anisocoria     OAB (overactive bladder)     Factor 5 Leiden mutation, heterozygous (H)     Family history of blood disease     Bladder incontinence     Physical  deconditioning     Cardiomyopathy, idiopathic (H)     Depression with anxiety     Osteoarthritis of hip     Herniated intervertebral disc     Past Medical History:   Diagnosis Date     Arrhythmia      Breast cancer (H) 2017     CHF (congestive heart failure) (H)      COPD (chronic obstructive pulmonary disease) (H)      Coronary artery disease      GERD (gastroesophageal reflux disease)      Hx of radiation therapy 2017     Hyperlipidemia      Hypertension      Idiopathic cardiomyopathy (H)        Past Social History     Reviewed, and she  reports that she quit smoking about 13 years ago. She quit smokeless tobacco use about 16 years ago. She reports that she does not drink alcohol or use drugs.    Family History     Reviewed, and family history includes Breast cancer in her maternal aunt; Osteoporosis in an other family member; Prostate cancer in her brother and maternal uncle.    Medication List   Post Discharge Medication Reconciliation Status: discharge medications reconciled, continue medications without change   Current Outpatient Medications on File Prior to Visit   Medication Sig Dispense Refill     acetaminophen (TYLENOL) 325 MG tablet Take 2 tablets (650 mg total) by mouth 3 (three) times a day. (Patient taking differently: Take 650 mg by mouth 4 (four) times a day. )  0     ADVAIR DISKUS 500-50 mcg/dose DISKUS USE 1 INHALATION TWICE A DAY 60 each 11     albuterol (PROAIR HFA;PROVENTIL HFA;VENTOLIN HFA) 90 mcg/actuation inhaler Inhale 2 puffs every 4 (four) hours as needed for wheezing. 1 Inhaler 0     ascorbic acid, vitamin C, (VITAMIN C) 1000 MG tablet Take 1,000 mg by mouth daily. Airborne 1 tab daily       aspirin 81 MG EC tablet Take 81 mg by mouth daily.       azelastine (ASTELIN) 137 mcg (0.1 %) nasal spray 2 sprays into each nostril daily.       benzonatate (TESSALON) 200 MG capsule Take 1 capsule (200 mg total) by mouth 3 (three) times a day as needed for cough. 90 capsule 2     calcitonin,  salmon, (MIACALCIN) 200 unit/actuation nasal spray Apply 1 spray into alternating nostrils daily for 14 days.  0     celecoxib (CELEBREX) 200 MG capsule Take 200 mg by mouth 2 (two) times a day.       chlorhexidine (PERIDEX) 0.12 % solution Apply 15 mL to the mouth or throat at bedtime as needed.        cholecalciferol, vitamin D3, 1,000 unit tablet Take 1,000 Units by mouth daily.       diclofenac sodium (VOLTAREN) 1 % Gel Apply 4 g to area of pain in low back 4 times daily  0     EPINEPHrine (EPIPEN/ADRENACLICK/AUVI-Q) 0.3 mg/0.3 mL injection Inject 0.3 mg into the shoulder, thigh, or buttocks.       famotidine (PEPCID) 20 MG tablet Take 20 mg by mouth daily.       fluticasone (FLONASE) 50 mcg/actuation nasal spray Apply 1 spray into each nostril as needed.        furosemide (LASIX) 40 MG tablet Take 1 tablet (40 mg total) by mouth 2 (two) times a day at 9am and 6pm.  0     gabapentin (NEURONTIN) 300 MG capsule Take 2 capsules (600 mg total) by mouth 2 (two) times a day. 360 capsule 2     HYDROcodone-acetaminophen 5-325 mg per tablet Take 1 tablet by mouth every 4 (four) hours as needed. 90 tablet 0     ipratropium (ATROVENT HFA) 17 mcg/actuation inhaler Inhale 2 puffs every 6 (six) hours. 3 Inhaler 3     ipratropium-albuterol (DUO-NEB) 0.5-2.5 mg/3 mL nebulizer 1 neb 4 times a day until seen by primary and then as needed or as directed 60 vial 1     lidocaine (XYLOCAINE) 5 % ointment Apply to area of pain in low back 4 times daily 35.44 g 0     losartan (COZAAR) 50 MG tablet TAKE 1 TABLET DAILY 90 tablet 3     melatonin 3 mg Tab tablet Take 1 tablet (3 mg total) by mouth at bedtime.  0     methocarbamoL (ROBAXIN) 750 MG tablet Take 1 tablet (750 mg total) by mouth every 6 (six) hours for 14 days.  0     metoprolol succinate (TOPROL-XL) 50 MG 24 hr tablet Take 1.5 tablets (75 mg total) by mouth daily. 135 tablet 3     mirabegron (MYRBETRIQ) 50 mg Tb24 ER tablet Take 1 tablet (50 mg total) by mouth daily. 90 tablet  3     omalizumab (XOLAIR) 150 mg/mL Syrg injection Inject 300 mg under the skin every 14 (fourteen) days.        pantoprazole (PROTONIX) 40 MG tablet Take 1 tablet (40 mg total) by mouth daily. 90 tablet 4     polyethylene glycol (MIRALAX) 17 gram packet Take 1 packet (17 g total) by mouth daily as needed.  0     QUEtiapine (SEROQUEL) 25 MG tablet Take 0.5 tablets (12.5 mg total) by mouth 3 (three) times a day as needed (Agitation that is not redirectable.).  0     rivaroxaban ANTICOAGULANT (XARELTO) 20 mg tablet Take 20 mg by mouth at bedtime.       senna-docusate (PERICOLACE) 8.6-50 mg tablet Take 1 tablet by mouth daily as needed for constipation.  0     simvastatin (ZOCOR) 40 MG tablet Take 1 tablet (40 mg total) by mouth at bedtime. 90 tablet 3     sodium chloride (OCEAN) 0.65 % nasal spray 2 sprays into each nostril as needed.       solifenacin (VESICARE) 5 MG tablet Take 1 tablet (5 mg total) by mouth at bedtime. 90 tablet 3     tamoxifen (NOLVADEX) 20 MG tablet TAKE 1 TABLET DAILY 90 tablet 4     traZODone (DESYREL) 50 MG tablet TAKE 1 TABLET(50 MG) BY MOUTH AT BEDTIME AS NEEDED 90 tablet 2     triamcinolone (KENALOG) 0.1 % cream Apply to rash on arms twice daily for one week 30 g 0     triamcinolone (KENALOG) 0.5 % ointment Apply o foot twice a day 30 g 1     venlafaxine (EFFEXOR-XR) 150 MG 24 hr capsule TAKE 1 CAPSULE DAILY 90 capsule 4     Current Facility-Administered Medications on File Prior to Visit   Medication Dose Route Frequency Provider Last Rate Last Dose     denosumab 60 mg (PROLIA 60 mg/ml)  60 mg Subcutaneous Q6 Months Suzanna Shane MD   60 mg at 02/04/20 1352       Allergies     Allergies   Allergen Reactions     Sulfa (Sulfonamide Antibiotics) Rash     Headaches and dizziness.     Homeopathic Drugs Unknown and Other (See Comments)     Comment: runny nose, Comment: runny nose     Mold Extracts      Morphine (Pf)      hallucinate     Lisinopril Cough, Unknown and Itching     Comment:  cough, Comment: cough     Sulfacetamide Sodium Rash       Review of Systems   A comprehensive review of 14 systems was done. Pertinent findings noted here and in history of present illness. All the rest negative.  Constitutional: Negative.  Negative for fever, chills, she has activity change, appetite change and fatigue.   HENT: Negative for congestion and facial swelling.    Eyes: Negative for photophobia, redness and visual disturbance.   Respiratory: Negative for cough and chest tightness.    Cardiovascular: Negative for chest pain, palpitations and leg swelling.   Gastrointestinal: Negative for nausea, diarrhea, constipation, blood in stool and abdominal distention.   Genitourinary: Negative.    Musculoskeletal: Negative.  Pain in her low back; today reporting intractable muscle spasms which are not improving  Has piriformis pain  Skin: Negative.    Neurological: Negative for dizziness, tremors, syncope, weakness, light-headedness and headaches.   Hematological: Does not bruise/bleed easily.   Psychiatric/Behavioral: Negative.  Significant distress due to pain      Physical Exam     Recent Vitals 10/24/2020   Height -   Weight -   BSA (m2) -   /88   Pulse 85   Temp 97.5   Temp src 1   SpO2 92   Some recent data might be hidden     Currently on 3 L of oxygen by nasal cannula wt 150lb  Constitutional: Oriented to person, place, and time and appears well-developed.   HEENT:  Normocephalic and atraumatic.  Eyes: Conjunctivae and EOM are normal. Pupils are equal, round, and reactive to light. No discharge.  No scleral icterus. Nose normal. Mouth/Throat: Oropharynx is clear and moist. No oropharyngeal exudate.    NECK: Normal range of motion. Neck supple. No JVD present. No tracheal deviation present. No thyromegaly present.   CARDIOVASCULAR: Normal rate, regular rhythm and intact distal pulses.  Exam reveals no gallop and no friction rub.  Systolic murmur present.  PULMONARY: Effort normal and breath sounds  normal. No respiratory distress.No Wheezing or rales.  Wet cough noted  ABDOMEN: Soft. Bowel sounds are normal. No distension and no mass.  There is no tenderness. There is no rebound and no guarding. No HSM.  MUSCULOSKELETAL: Normal range of motion. No edema and no tenderness. Mild kyphosis,  tenderness  to palpation on her lower thoracic spine  Mobility is limited.  LYMPH NODES: Has no cervical, supraclavicular, axillary and groin adenopathy.   NEUROLOGICAL: Alert and oriented to person, place, and time. No cranial nerve deficit.  Normal muscle tone. Coordination normal.   GENITOURINARY: Deferred exam.  SKIN: Skin is warm and dry. No rash noted. No erythema. No pallor.   EXTREMITIES: No cyanosis, no clubbing, no edema. No Deformity.  PSYCHIATRIC: Normal mood, affect and behavior.  High anxiety due to pain      Lab Results     Recent Results (from the past 240 hour(s))   Basic Metabolic Panel    Collection Time: 10/24/20  6:08 AM   Result Value Ref Range    Sodium 140 136 - 145 mmol/L    Potassium 4.1 3.5 - 5.0 mmol/L    Chloride 103 98 - 107 mmol/L    CO2 24 22 - 31 mmol/L    Anion Gap, Calculation 13 5 - 18 mmol/L    Glucose 89 70 - 125 mg/dL    Calcium 9.9 8.5 - 10.5 mg/dL    BUN 18 8 - 28 mg/dL    Creatinine 1.05 0.60 - 1.10 mg/dL    GFR MDRD Af Amer >60 >60 mL/min/1.73m2    GFR MDRD Non Af Amer 51 (L) >60 mL/min/1.73m2   Basic Metabolic Panel    Collection Time: 10/26/20  9:55 AM   Result Value Ref Range    Sodium 141 136 - 145 mmol/L    Potassium 4.7 3.5 - 5.0 mmol/L    Chloride 106 98 - 107 mmol/L    CO2 14 (L) 22 - 31 mmol/L    Anion Gap, Calculation 21 (H) 5 - 18 mmol/L    Glucose 91 70 - 125 mg/dL    Calcium 10.0 8.5 - 10.5 mg/dL    BUN 24 8 - 28 mg/dL    Creatinine 1.41 (H) 0.60 - 1.10 mg/dL    GFR MDRD Af Amer 44 (L) >60 mL/min/1.73m2    GFR MDRD Non Af Amer 36 (L) >60 mL/min/1.73m2   Calcium    Collection Time: 10/26/20  9:55 AM   Result Value Ref Range    Calcium 10.0 8.5 - 10.5 mg/dL   Magnesium     Collection Time: 10/26/20  9:55 AM   Result Value Ref Range    Magnesium 2.1 1.8 - 2.6 mg/dL   Phosphorus    Collection Time: 10/26/20  9:55 AM   Result Value Ref Range    Phosphorus 4.2 2.5 - 4.5 mg/dL   HM2(CBC w/o Differential)    Collection Time: 10/26/20  9:55 AM   Result Value Ref Range    WBC 10.8 4.0 - 11.0 thou/uL    RBC 4.90 3.80 - 5.40 mill/uL    Hemoglobin 14.5 12.0 - 16.0 g/dL    Hematocrit 45.7 35.0 - 47.0 %    MCV 93 80 - 100 fL    MCH 29.6 27.0 - 34.0 pg    MCHC 31.7 (L) 32.0 - 36.0 g/dL    RDW 13.8 11.0 - 14.5 %    Platelets 185 140 - 440 thou/uL    MPV 10.2 8.5 - 12.5 fL   COVID-19 Virus PCR MRF    Collection Time: 10/27/20  8:00 AM    Specimen: Respiratory   Result Value Ref Range    COVID-19 VIRUS SPECIMEN SOURCE Nasopharyngeal     2019-nCOV Not Detected                   VASHTI Sanchez

## 2021-06-12 NOTE — PROGRESS NOTES
Patient here ambulatory for final treatment.  Patient with some c/o itching and erythema in treatment area.  Written discharge instructions reviewed and given to patient.  Follow up with Dr. Gonzales in about 4 to 6 weeks.  Patient states she is having surgery and will probably have her follow up visit in a little over 6 weeks and I assured patient that this would be OK.  Patient verbalize understanding and left ambulatory with copy of appointments.

## 2021-06-12 NOTE — PROGRESS NOTES
"S: Pt seen 10/18/20 at 0830 at the [facility], room 372 for a TLSO.  Pt expecting consult.    O: 76 y/o, 5'2\", 156 lb pt sitting in bedside chair; awake and oriented.    A: Acute/subacute T12 compression fracture    P: Fit/delivery of small [facility] TLSO w/ pt sitting.  Pt evaluated sitting and standing.  Goals, ROM restrictions reviewed and demonstrated.  Pt was provided clear written and oral instruction related to donning, doffing, use, maintenance, safety and potential hazards; verbalizes understanding and satisfaction with fit, comfort, and function and is independent w/ fitting and adjustment.  F/U prn.  "

## 2021-06-13 NOTE — PROGRESS NOTES
[facility] Admission note      Patient: Irene León  MRN: [id number]  Date of Service: 11/11/2020      [facility] [[id number]]  Reason for Visit     Chief Complaint   Patient presents with     Review Of Multiple Medical Conditions   F/U PAIN /MUSCLE SPASMS/ chf    Code Status     FULL CODE    Assessment     -Acute CHF exacerbation with persistent weight gain in spite of being on a higher dose of Lasix  -Ongoing concerns about cough and congestion with hypoxic respiratory failure  -T12 compression fracture  -ANATOLIY with elevated creatinine 1.4 in the TCU  -Osteoporosis restarted on Prolia  -Pain management no longer on narcotics; no discharge on narcotics including Percocet  -Acute metabolic encephalopathy/delirium felt to be secondary to opioid medication  -CHF with reduced ejection fraction acute exacerbation  -- ANATOLIY /cardiorenal syndrome  -History of COPD and asthma  -Acute on chronic hypoxic respiratory failure ON 3L BY NC  -Peripheral arterial disease  -History of factor V Leiden and occipital stroke on Xarelto  -History of breast cancer  -Concern for mild dementia with baseline cognitive impairment  Slums 13/30  -Generalized weakness      Plan     Patient has been admitted to the TCU for strengthening and rehab  Patient followed up for her CHF exacerbation.  Did not respond to Lasix  Significant clinical improvement noted with Bumex   Weights are down 143 pounds which is a 7 pound weight loss.  No longer coughing or congested  Creatinine is 1.46.  BMP shows stable electrolytes.  Plan is to discontinue her current Bumex regimen.  Continue with Bumex 2 mg just in the morning.  Recheck BMP in 1 week.  No narcotic taper for now.  Follow-up with recommendations from orthopedics regarding kyphoplasty or steroid injection in her back in light of her intractable back pain and piriformis syndrome.  She has an orthopedic appointment today    History     Patient is a very pleasant 75  y.o. female who is admitted to TCU.  Admitted with a T12 compression fracture  She has been discharged weightbearing as tolerated with a TLSO brace to be worn all day  Outpatient follow-up for consideration of kyphoplasty recommended in 2 to 3 weeks.  Patient is continuing to rate her pain is quite high she is on narcotics and has been requesting them quite a bit.  As per her she is not ready anywhere close to weaning off herself narcotics family has been pushing for some taper.  She also had recent exacerbation of CHF for which she was given high doses of Bumex.  BMPs have been stable she is reporting her cough and congestion have improved.  Cognitively she has some impairment but overall has been stable she appears to be mildly forgetful.  Physically remains quite debilitated and wheelchair-bound      Past Medical History     Active Ambulatory (Non-Hospital) Problems    Diagnosis     Pain due to fracture     High risk medication use     Heart failure exacerbated by sotalol (H)     T12 compression fracture (H)     Acute on chronic diastolic congestive heart failure (H)     Metabolic encephalopathy     Low back pain     Compression fracture of L1 vertebra, sequela     Chronic respiratory failure with hypoxia (H)     Chronic obstructive pulmonary disease, unspecified COPD type (H)     Chronic systolic congestive heart failure (H)     Hypoxia     S/P hip replacement, left     Hip pain, left     Acute hypoxemic respiratory failure (H)     Chest pain     COPD exacerbation (H)     Herpes zoster without complication     Chest wall pain     Benign essential hypertension     Dyslipidemia     Acute respiratory failure with hypoxia (H)     Pulsatile tinnitus of both ears     Hot flashes due to tamoxifen     Pleural nodules     Hot flashes, menopausal     Malignant neoplasm of central portion of left female breast (H)     CKD (chronic kidney disease) stage 3, GFR 30-59 ml/min     Dyspnea     Insomnia     Leg pain, left      Femur fracture (H)     GERD (gastroesophageal reflux disease)     Post menopausal syndrome     OP (osteoporosis)     Hypertension     Pulmonary emphysema, unspecified emphysema type (H)     Hyperlipidemia     Centrilobular emphysema (H)     Anisocoria     OAB (overactive bladder)     Factor 5 Leiden mutation, heterozygous (H)     Family history of blood disease     Bladder incontinence     Physical deconditioning     Cardiomyopathy, idiopathic (H)     Depression with anxiety     Osteoarthritis of hip     Herniated intervertebral disc     Past Medical History:   Diagnosis Date     Arrhythmia      Breast cancer (H) 2017     CHF (congestive heart failure) (H)      COPD (chronic obstructive pulmonary disease) (H)      Coronary artery disease      GERD (gastroesophageal reflux disease)      Hx of radiation therapy 2017     Hyperlipidemia      Hypertension      Idiopathic cardiomyopathy (H)        Past Social History     Reviewed, and she  reports that she quit smoking about 13 years ago. She quit smokeless tobacco use about 16 years ago. She reports that she does not drink alcohol or use drugs.    Family History     Reviewed, and family history includes Breast cancer in her maternal aunt; Osteoporosis in an other family member; Prostate cancer in her brother and maternal uncle.    Medication List   Post Discharge Medication Reconciliation Status: discharge medications reconciled, continue medications without change   Current Outpatient Medications on File Prior to Visit   Medication Sig Dispense Refill     acetaminophen (TYLENOL) 325 MG tablet Take 2 tablets (650 mg total) by mouth 3 (three) times a day. (Patient taking differently: Take 650 mg by mouth 4 (four) times a day. )  0     ADVAIR DISKUS 500-50 mcg/dose DISKUS USE 1 INHALATION TWICE A DAY 60 each 11     albuterol (PROAIR HFA;PROVENTIL HFA;VENTOLIN HFA) 90 mcg/actuation inhaler Inhale 2 puffs every 4 (four) hours as needed for wheezing. 1 Inhaler 0     ascorbic  acid, vitamin C, (VITAMIN C) 1000 MG tablet Take 1,000 mg by mouth daily. Airborne 1 tab daily       aspirin 81 MG EC tablet Take 81 mg by mouth daily.       azelastine (ASTELIN) 137 mcg (0.1 %) nasal spray 2 sprays into each nostril daily.       benzonatate (TESSALON) 200 MG capsule Take 1 capsule (200 mg total) by mouth 3 (three) times a day as needed for cough. 90 capsule 2     celecoxib (CELEBREX) 200 MG capsule Take 200 mg by mouth 2 (two) times a day.       chlorhexidine (PERIDEX) 0.12 % solution Apply 15 mL to the mouth or throat at bedtime as needed.        cholecalciferol, vitamin D3, 1,000 unit tablet Take 1,000 Units by mouth daily.       diclofenac sodium (VOLTAREN) 1 % Gel Apply 4 g to area of pain in low back 4 times daily  0     EPINEPHrine (EPIPEN/ADRENACLICK/AUVI-Q) 0.3 mg/0.3 mL injection Inject 0.3 mg into the shoulder, thigh, or buttocks.       famotidine (PEPCID) 20 MG tablet Take 20 mg by mouth daily.       fluticasone (FLONASE) 50 mcg/actuation nasal spray Apply 1 spray into each nostril as needed.        furosemide (LASIX) 40 MG tablet Take 1 tablet (40 mg total) by mouth 2 (two) times a day at 9am and 6pm.  0     gabapentin (NEURONTIN) 300 MG capsule Take 2 capsules (600 mg total) by mouth 2 (two) times a day. 360 capsule 2     HYDROcodone-acetaminophen 5-325 mg per tablet Take 1 tablet by mouth every 4 (four) hours as needed. 90 tablet 0     ipratropium (ATROVENT HFA) 17 mcg/actuation inhaler Inhale 2 puffs every 6 (six) hours. 3 Inhaler 3     ipratropium-albuterol (DUO-NEB) 0.5-2.5 mg/3 mL nebulizer 1 neb 4 times a day until seen by primary and then as needed or as directed 60 vial 1     lidocaine (XYLOCAINE) 5 % ointment Apply to area of pain in low back 4 times daily 35.44 g 0     losartan (COZAAR) 50 MG tablet TAKE 1 TABLET DAILY 90 tablet 3     melatonin 3 mg Tab tablet Take 1 tablet (3 mg total) by mouth at bedtime.  0     metoprolol succinate (TOPROL-XL) 50 MG 24 hr tablet Take 1.5  tablets (75 mg total) by mouth daily. 135 tablet 3     mirabegron (MYRBETRIQ) 50 mg Tb24 ER tablet Take 1 tablet (50 mg total) by mouth daily. 90 tablet 3     omalizumab (XOLAIR) 150 mg/mL Syrg injection Inject 300 mg under the skin every 14 (fourteen) days.        pantoprazole (PROTONIX) 40 MG tablet Take 1 tablet (40 mg total) by mouth daily. 90 tablet 4     polyethylene glycol (MIRALAX) 17 gram packet Take 1 packet (17 g total) by mouth daily as needed.  0     QUEtiapine (SEROQUEL) 25 MG tablet Take 0.5 tablets (12.5 mg total) by mouth 3 (three) times a day as needed (Agitation that is not redirectable.).  0     rivaroxaban ANTICOAGULANT (XARELTO) 20 mg tablet Take 20 mg by mouth at bedtime.       senna-docusate (PERICOLACE) 8.6-50 mg tablet Take 1 tablet by mouth daily as needed for constipation.  0     simvastatin (ZOCOR) 40 MG tablet Take 1 tablet (40 mg total) by mouth at bedtime. 90 tablet 3     sodium chloride (OCEAN) 0.65 % nasal spray 2 sprays into each nostril as needed.       solifenacin (VESICARE) 5 MG tablet Take 1 tablet (5 mg total) by mouth at bedtime. 90 tablet 3     tamoxifen (NOLVADEX) 20 MG tablet TAKE 1 TABLET DAILY 90 tablet 4     traZODone (DESYREL) 50 MG tablet TAKE 1 TABLET(50 MG) BY MOUTH AT BEDTIME AS NEEDED 90 tablet 2     triamcinolone (KENALOG) 0.1 % cream Apply to rash on arms twice daily for one week 30 g 0     triamcinolone (KENALOG) 0.5 % ointment Apply o foot twice a day 30 g 1     venlafaxine (EFFEXOR-XR) 150 MG 24 hr capsule TAKE 1 CAPSULE DAILY 90 capsule 4     Current Facility-Administered Medications on File Prior to Visit   Medication Dose Route Frequency Provider Last Rate Last Dose     denosumab 60 mg (PROLIA 60 mg/ml)  60 mg Subcutaneous Q6 Months Suzanna Shane MD   60 mg at 02/04/20 1352       Allergies     Allergies   Allergen Reactions     Sulfa (Sulfonamide Antibiotics) Rash     Headaches and dizziness.     Homeopathic Drugs Unknown and Other (See Comments)      Comment: runny nose, Comment: runny nose     Mold Extracts      Morphine (Pf)      hallucinate     Lisinopril Cough, Unknown and Itching     Comment: cough, Comment: cough     Sulfacetamide Sodium Rash       Review of Systems   A comprehensive review of 14 systems was done. Pertinent findings noted here and in history of present illness. All the rest negative.  Constitutional: Negative.  Negative for fever, chills, she has activity change, appetite change and fatigue.   HENT: Negative for congestion and facial swelling.    Eyes: Negative for photophobia, redness and visual disturbance.   Respiratory: Negative for cough and chest tightness.    Cardiovascular: Negative for chest pain, palpitations and leg swelling.   Gastrointestinal: Negative for nausea, diarrhea, constipation, blood in stool and abdominal distention.   Genitourinary: Negative.    Musculoskeletal: Negative.  Pain in her low back; today reporting intractable muscle spasms which are not improving  Has piriformis pain  Skin: Negative.    Neurological: Negative for dizziness, tremors, syncope, weakness, light-headedness and headaches.   Hematological: Does not bruise/bleed easily.   Psychiatric/Behavioral: Negative.  Significant distress due to pain      Physical Exam     Recent Vitals 10/24/2020   Height -   Weight -   BSA (m2) -   /88   Pulse 85   Temp 97.5   Temp src 1   SpO2 92   Some recent data might be hidden     Currently on 3 L of oxygen by nasal cannula wt 143lb  R/C /67  Constitutional: Oriented to person, place, and time and appears well-developed.   HEENT:  Normocephalic and atraumatic.  Eyes: Conjunctivae and EOM are normal. Pupils are equal, round, and reactive to light. No discharge.  No scleral icterus. Nose normal. Mouth/Throat: Oropharynx is clear and moist. No oropharyngeal exudate.    NECK: Normal range of motion. Neck supple. No JVD present. No tracheal deviation present. No thyromegaly present.   CARDIOVASCULAR: Normal  rate, regular rhythm and intact distal pulses.  Exam reveals no gallop and no friction rub.  Systolic murmur present.  PULMONARY: Effort normal and breath sounds normal. No respiratory distress.No Wheezing or rales.  ABDOMEN: Soft. Bowel sounds are normal. No distension and no mass.  There is no tenderness. There is no rebound and no guarding. No HSM.  MUSCULOSKELETAL: Normal range of motion. No edema and no tenderness. Mild kyphosis,  tenderness  to palpation on her lower thoracic spine  Mobility is limited.  LYMPH NODES: Has no cervical, supraclavicular, axillary and groin adenopathy.   NEUROLOGICAL: Alert and oriented to person, place, and time. No cranial nerve deficit.  Normal muscle tone. Coordination normal.   GENITOURINARY: Deferred exam.  SKIN: Skin is warm and dry. No rash noted. No erythema. No pallor.   EXTREMITIES: No cyanosis, no clubbing, no edema. No Deformity.  PSYCHIATRIC: Normal mood, affect and behavior.  High anxiety due to pain      Lab Results     Recent Results (from the past 240 hour(s))   COVID-19 Virus PCR MRF    Collection Time: 11/03/20  8:00 AM    Specimen: Nasopharyngeal Swab; Respiratory   Result Value Ref Range    COVID-19 VIRUS SPECIMEN SOURCE Nasopharyngeal     2019-nCOV Not Detected    Basic Metabolic Panel    Collection Time: 11/04/20  7:02 AM   Result Value Ref Range    Sodium 140 136 - 145 mmol/L    Potassium 4.0 3.5 - 5.0 mmol/L    Chloride 105 98 - 107 mmol/L    CO2 26 22 - 31 mmol/L    Anion Gap, Calculation 9 5 - 18 mmol/L    Glucose 77 70 - 125 mg/dL    Calcium 9.1 8.5 - 10.5 mg/dL    BUN 34 (H) 8 - 28 mg/dL    Creatinine 1.46 (H) 0.60 - 1.10 mg/dL    GFR MDRD Af Amer 42 (L) >60 mL/min/1.73m2    GFR MDRD Non Af Amer 35 (L) >60 mL/min/1.73m2   COVID-19 Virus PCR MRF    Collection Time: 11/09/20  8:00 AM    Specimen: Respiratory   Result Value Ref Range    COVID-19 VIRUS SPECIMEN SOURCE Nasopharyngeal     2019-nCOV Not Detected                   VASHTI Sanchez

## 2021-06-13 NOTE — TELEPHONE ENCOUNTER
I called the patient to discuss her renal function from yesterday's labs. Creatinine worse at 1.4    It turns out that she is not actually taking her furosemide as prescribed.  She is supposed to be taking 80 mg twice daily but she is only taking 20 mg twice daily.    She may be having a recurrence of her cardiorenal issue that did resolve with diuresis in the hospital.    She is hesitant about 80 mg twice daily but I was able to convince her to start with 40 mg twice daily, and eventually 80 mg in the morning, 40 mg in the evening.    We talked about the need for home health to assist her with her medications.    She is hesitant about coming back into the clinic for a recheck but was open to the idea of seeing the CHF clinic for a recheck of her weight, BP, and overall fluid status.  I gave her the number to call and reschedule an appointment with Dr. Chilel as well as to set up an appointment with the CHF clinic.  She was agreeable to see her PCP in February.

## 2021-06-13 NOTE — TELEPHONE ENCOUNTER
[facility] Nurse Triage Telephone Note      Provider: Paola Rose MD  Facility: [facility] Type: TCU    Caller: [place]  Call Back Number:  [phone]    Allergies: Sulfa (sulfonamide antibiotics), Homeopathic drugs, Mold extracts, Morphine (pf), Lisinopril, and Sulfacetamide sodium    Reason for call: Nurse calling to clarify the order for Celebrex.  The original order was for Celebrex 200mg two times a day x 10 days, which was due to end on 11/5/20.  On 11/2, there was an order to continue Celebrex.  Patient has not been getting Celebrex as of 11/6/10.       Verbal Order/Direction given by Provider: Leave patient off Celebrex.      Provider giving order: Paola Rose MD    Verbal order given to: [place] Gabriel RN

## 2021-06-13 NOTE — TELEPHONE ENCOUNTER
Do to our capacity we have sent this referral to [facility]. Phone: [phone]    Giovani at [facility]

## 2021-06-13 NOTE — PROGRESS NOTES
Clinic Note    Assessment:     Assessment and Plan:  1. Compression fracture of T12 vertebra, sequela  Overdue for Prolia shot; this was given today.  She has a follow-up appointment with orthospine to discuss vertebroplasty.  She could benefit from home PT.  It sounds like there has been some issues setting up home health recently but she is open to reestablishing that today.  - Ambulatory referral to Home Health    2. Chronic congestive heart failure, unspecified heart failure type (H)  Furosemide 80 mg twice daily.  Weights have been relatively stable.  She did eat some potato chips the other day and saw some swelling in her lower extremities as a result of that.  Education provided on low-salt diet today.  Recheck BMP given her history of cardiorenal syndrome during her hospitalization  - Ambulatory referral to Home Health    3. Encounter for immunization  Flu shot given today  - Influenza,Quad,High Dose,PF 65 YR+    4. ANATOLIY (acute kidney injury) (H)  Thought to be cardiorenal in the hospital.  Not taking her furosemide as prescribed.  She feels like she is at her baseline and appears compensated today    5. Essential hypertension  Blood pressure 140/70 today  - Basic Metabolic Panel    6. Chronic respiratory failure with hypoxia (H)  She is no longer doing her daily azithromycin and does not wish to restart that.  Oxygen 88% on room air today but she does have oxygen available to her at home if needed.  Chronic COPD using her inhalers as prescribed.  - Ambulatory referral to Home Health       Patient Instructions   We will give you your Prolia shot today.    Flu shot today.    Weight looks stable.  You need to make sure that you are not consuming too much salt/sodium.  No more potato chips.    Take your furosemide (Lasix) twice daily as prescribed.    Make sure to call Dr. Chilel and schedule a follow-up appointment.  You were due to see her this past November but missed that appointment.    I will place another  order for home health nursing today.    We need to recheck your kidney labs and electrolytes.    Follow-up with the spine doctor at [facility] as planned for your compression fracture.    You had been taking azithromycin in the past for your COPD.  It does not sound like you are taking this anymore.    Follow-up with Dr. Darling in 2 months for routine checkup.    Return in about 2 months (around 2/15/2021).         Subjective:      Irene León is a 75 y.o. female who comes to the clinic today for hospital discharge follow-up.    She was hospitalized this past October with nontraumatic back pain.  She had a CT of her lumbar spine which did reveal a T12 compression fracture.    She was discharged to the TCU on narcotics.  She developed encephalopathy and confusion on her narcotics at the TCU.  Narcotics were discontinued.    Using her TLSO brace now during the day.  Still dealing with significant pain but managing with Tylenol 3 times per day.  Still not using lidocaine ointment.    At home with , whom is dealing with some dementia issues of his own.  There have been some question about her cognition at prior appointments.  She has been having a hard time setting up home health.    She is overdue for Prolia.  Given her first dose of Prolia this past February.    She does have an appointment with [facility] orthospine to discuss possible vertebroplasty.  She plans on keeping her follow-up appointment next week.    She was switched from Lasix to Bumex at the [facility] but is now back on Lasix 80 mg twice daily.  Taking her medications as prescribed.  She has noticed some swelling in her lower extremities but says that she ate some salty potato chips recently.    She has oxygen available to her at home if she becomes short of breath.    No longer using azithromycin.  There was a conversation about using azithromycin with venlafaxine and Advair which could potentially lead to sudden cardiac death.  That scared her and no longer  "using the azithromycin medication    She is overdue to have a BMP rechecked.  AKA while hospitalized, thought to be cardiorenal syndrome.  Checking weights daily.  Have been stable.    Feeling safe at home.    The following portions of the patient's history were reviewed and updated as appropriate: Allergies, medications, problem list, prior note.     Review of Systems:    Review is otherwise negative except for what is mentioned above.     Social Hx:    Social History     Tobacco Use   Smoking Status Former Smoker     Quit date: 10/28/2007     Years since quittin.1   Smokeless Tobacco Former User     Quit date: 2004         Objective:     Vitals:    12/15/20 1305   BP: 140/70   Patient Site: Left Arm   Patient Position: Sitting   Cuff Size: Adult Regular   Pulse: 72   SpO2: (!) 88%   Weight: 144 lb 4.8 oz (65.5 kg)   Height: 5' 2\" (1.575 m)       Exam:    General: No apparent distress. Calm. Alert and Oriented X3. Pt behavior is appropriate.  In a wheelchair.  Has a cane with her.  Chest/Lungs: Normal chest wall, clear to auscultation, normal respiratory effort and rate.   Heart/Pulses: Regular rate and rhythm, strong and equal radial pulses, no murmurs. Capillary refill <2 seconds.  +1 pitting edema bilateral lower extremities      Patient Active Problem List   Diagnosis     Leg pain, left     Femur fracture (H)     Hypertension     Pulmonary emphysema, unspecified emphysema type (H)     Hyperlipidemia     Bladder incontinence     Cardiomyopathy, idiopathic (H)     Depression with anxiety     Family history of blood disease     GERD (gastroesophageal reflux disease)     Post menopausal syndrome     OP (osteoporosis)     Osteoarthritis of hip     Insomnia     Dyspnea     Malignant neoplasm of central portion of left female breast (H)     Pleural nodules     Hot flashes due to tamoxifen     COPD exacerbation (H)     Herpes zoster without complication     Chest wall pain     Benign essential hypertension     " Dyslipidemia     Acute respiratory failure with hypoxia (H)     Factor 5 Leiden mutation, heterozygous (H)     CKD (chronic kidney disease) stage 3, GFR 30-59 ml/min     Centrilobular emphysema (H)     Anisocoria     Herniated intervertebral disc     Hot flashes, menopausal     OAB (overactive bladder)     Physical deconditioning     Pulsatile tinnitus of both ears     Chest pain     Acute hypoxemic respiratory failure (H)     S/P hip replacement, left     Hip pain, left     Hypoxia     Low back pain     Compression fracture of L1 vertebra, sequela     Chronic respiratory failure with hypoxia (H)     Chronic obstructive pulmonary disease, unspecified COPD type (H)     Chronic systolic congestive heart failure (H)     Metabolic encephalopathy     T12 compression fracture (H)     Acute on chronic diastolic congestive heart failure (H)     Heart failure exacerbated by sotalol (H)     Pain due to fracture     High risk medication use     Current Outpatient Medications   Medication Sig Dispense Refill     acetaminophen (TYLENOL) 325 MG tablet Take 2 tablets (650 mg total) by mouth 3 (three) times a day. (Patient taking differently: Take 650 mg by mouth 4 (four) times a day. )  0     ADVAIR DISKUS 500-50 mcg/dose DISKUS USE 1 INHALATION TWICE A DAY 60 each 11     albuterol (PROAIR HFA;PROVENTIL HFA;VENTOLIN HFA) 90 mcg/actuation inhaler Inhale 2 puffs every 4 (four) hours as needed for wheezing. 1 Inhaler 0     ascorbic acid, vitamin C, (VITAMIN C) 1000 MG tablet Take 1,000 mg by mouth daily. Airborne 1 tab daily       aspirin 81 MG EC tablet Take 81 mg by mouth daily.       azelastine (ASTELIN) 137 mcg (0.1 %) nasal spray 2 sprays into each nostril daily.       benzonatate (TESSALON) 200 MG capsule Take 1 capsule (200 mg total) by mouth 3 (three) times a day as needed for cough. 90 capsule 2     chlorhexidine (PERIDEX) 0.12 % solution Apply 15 mL to the mouth or throat at bedtime as needed.        cholecalciferol,  vitamin D3, 1,000 unit tablet Take 1,000 Units by mouth daily.       EPINEPHrine (EPIPEN/ADRENACLICK/AUVI-Q) 0.3 mg/0.3 mL injection Inject 0.3 mg into the shoulder, thigh, or buttocks.       famotidine (PEPCID) 20 MG tablet Take 20 mg by mouth daily.       fluticasone (FLONASE) 50 mcg/actuation nasal spray Apply 1 spray into each nostril as needed.        furosemide (LASIX) 80 MG tablet Take 80 mg by mouth 2 (two) times a day at 9am and 6pm.        gabapentin (NEURONTIN) 300 MG capsule Take 2 capsules (600 mg total) by mouth 2 (two) times a day. 360 capsule 2     ipratropium (ATROVENT HFA) 17 mcg/actuation inhaler Inhale 2 puffs every 6 (six) hours. 3 Inhaler 3     ipratropium-albuterol (DUO-NEB) 0.5-2.5 mg/3 mL nebulizer 1 neb 4 times a day until seen by primary and then as needed or as directed 60 vial 1     lidocaine (XYLOCAINE) 5 % ointment Apply to area of pain in low back 4 times daily 35.44 g 0     losartan (COZAAR) 50 MG tablet TAKE 1 TABLET DAILY 90 tablet 3     melatonin 3 mg Tab tablet Take 1 tablet (3 mg total) by mouth at bedtime.  0     metoprolol succinate (TOPROL-XL) 50 MG 24 hr tablet Take 1.5 tablets (75 mg total) by mouth daily. 135 tablet 3     mirabegron (MYRBETRIQ) 50 mg Tb24 ER tablet Take 1 tablet (50 mg total) by mouth daily. 90 tablet 3     omalizumab (XOLAIR) 150 mg/mL Syrg injection Inject 300 mg under the skin every 14 (fourteen) days.        oxyCODONE-acetaminophen (PERCOCET/ENDOCET) 5-325 mg per tablet TK 1 T PO Q 12 PRN       pantoprazole (PROTONIX) 40 MG tablet Take 1 tablet (40 mg total) by mouth daily. 90 tablet 4     polyethylene glycol (MIRALAX) 17 gram packet Take 1 packet (17 g total) by mouth daily as needed.  0     rivaroxaban ANTICOAGULANT (XARELTO) 20 mg tablet Take 20 mg by mouth at bedtime.       senna-docusate (PERICOLACE) 8.6-50 mg tablet Take 1 tablet by mouth daily as needed for constipation.  0     simvastatin (ZOCOR) 40 MG tablet Take 1 tablet (40 mg total) by  mouth at bedtime. 90 tablet 3     sodium chloride (OCEAN) 0.65 % nasal spray 2 sprays into each nostril as needed.       solifenacin (VESICARE) 5 MG tablet Take 1 tablet (5 mg total) by mouth at bedtime. 90 tablet 3     tamoxifen (NOLVADEX) 20 MG tablet TAKE 1 TABLET DAILY 90 tablet 4     traZODone (DESYREL) 50 MG tablet TAKE 1 TABLET(50 MG) BY MOUTH AT BEDTIME AS NEEDED 90 tablet 2     venlafaxine (EFFEXOR-XR) 150 MG 24 hr capsule TAKE 1 CAPSULE DAILY 90 capsule 4     diclofenac sodium (VOLTAREN) 1 % Gel Apply 4 g to area of pain in low back 4 times daily (Patient not taking: Reported on 12/7/2020)  0     No current facility-administered medications for this visit.          Aakash Flores (Rob), SUNITA    12/15/2020

## 2021-06-13 NOTE — TELEPHONE ENCOUNTER
I spoke with Anabela from [facility] and advised of Dr. Montanez message below.    Anabela states patient called them this morning and cancelled all her cares with them and seem confused.  She is inquiring what patients cognitive norm is.  Patient stated she was not feeling well and did not want them coming into her home for cares, at one point patient stated she was not eating and then a few minutes later stated she was eating fine.    Home care just concerned and wanted Dr. Montanez aware.  Anushka MAYER CMA

## 2021-06-13 NOTE — PROGRESS NOTES
[facility] Admission note      Patient: Irene León  MRN: [id number]  Date of Service: 11/16/2020      [facility] [[id number]]  Reason for Visit     Chief Complaint   Patient presents with     Review Of Multiple Medical Conditions   F/U PAIN /MUSCLE SPASMS/ chf    Code Status     FULL CODE    Assessment     -Acute CHF exacerbation with persistent weight gain in spite of being on a higher dose of Lasix  -Ongoing concerns about cough and congestion with hypoxic respiratory failure  -T12 compression fracture  -ANATOLIY with elevated creatinine 1.4 in the TCU  -Osteoporosis restarted on Prolia  -Pain management no longer on narcotics; no discharge on narcotics including Percocet  -Acute metabolic encephalopathy/delirium felt to be secondary to opioid medication  -CHF with reduced ejection fraction acute exacerbation  -- ANATOLIY /cardiorenal syndrome  -History of COPD and asthma  -Acute on chronic hypoxic respiratory failure ON 3L BY NC  -Peripheral arterial disease  -History of factor V Leiden and occipital stroke on Xarelto  -History of breast cancer  -Concern for mild dementia with baseline cognitive impairment  Slums 13/30  -Generalized weakness      Plan     Patient has been admitted to the TCU for strengthening and rehab  Patient continues to have issues with pain management with intractable low back and hip pain.  Orthopedic consultation was reviewed.  She has been recommended to go for kyphoplasty and piriformis injection.  In the meantime she is awaiting those appointments.  She is compliant with her bracing.  Weights are stable and improved to 142 pounds with minimal cough and congestion noted today on exam.  She is at baseline oxygen needs of 2 L.  Her CHF exacerbation has resolved quite nicely and she is being maintained on Bumex 2 mg.  Her issue remains to be she wants to go home her  has survivors dementia and she is his caregiver  Unfortunately she understands that she  is still having significant pain and would like to be pain-free before going home she is hoping she can get the injections and then go home  Has BMP done in the TCU with normal electrolytes and stable creatinine at 1.4    History     Patient is a very pleasant 75 y.o. female who is admitted to TCU.  Admitted with a T12 compression fracture  She has been discharged weightbearing as tolerated with a TLSO brace to be worn all day  She did have a follow-up with orthopedics.  At per the recommendation she has been given a referral for kyphoplasty as well as for having a piriformis injection.  She is still awaiting those appointments and quite upset that she has not received them as she feels that is a major barrier to her going home  Patient is continuing to rate her pain is quite high.  She is compliant with her TLSO bracing.  Unfortunately still rating her pain is quite high and not ready for any taper the family has been requesting 1.  She feels that her pain is still quite high  She also had recent exacerbation of CHF for which she was given high doses of Bumex.  BMPs have been stable she is reporting her cough and congestion have improved.  Now down to 142 pounds which is almost close to 8 pound weight loss  She is back to her baseline oxygen needs which is 2 to 3 L.  Cognitively she has some impairment but overall has been stable she appears to be mildly forgetful.  Physically remains quite debilitated and wheelchair-bound      Past Medical History     Active Ambulatory (Non-Hospital) Problems    Diagnosis     Pain due to fracture     High risk medication use     Heart failure exacerbated by sotalol (H)     T12 compression fracture (H)     Acute on chronic diastolic congestive heart failure (H)     Metabolic encephalopathy     Low back pain     Compression fracture of L1 vertebra, sequela     Chronic respiratory failure with hypoxia (H)     Chronic obstructive pulmonary disease, unspecified COPD type (H)      Chronic systolic congestive heart failure (H)     Hypoxia     S/P hip replacement, left     Hip pain, left     Acute hypoxemic respiratory failure (H)     Chest pain     COPD exacerbation (H)     Herpes zoster without complication     Chest wall pain     Benign essential hypertension     Dyslipidemia     Acute respiratory failure with hypoxia (H)     Pulsatile tinnitus of both ears     Hot flashes due to tamoxifen     Pleural nodules     Hot flashes, menopausal     Malignant neoplasm of central portion of left female breast (H)     CKD (chronic kidney disease) stage 3, GFR 30-59 ml/min     Dyspnea     Insomnia     Leg pain, left     Femur fracture (H)     GERD (gastroesophageal reflux disease)     Post menopausal syndrome     OP (osteoporosis)     Hypertension     Pulmonary emphysema, unspecified emphysema type (H)     Hyperlipidemia     Centrilobular emphysema (H)     Anisocoria     OAB (overactive bladder)     Factor 5 Leiden mutation, heterozygous (H)     Family history of blood disease     Bladder incontinence     Physical deconditioning     Cardiomyopathy, idiopathic (H)     Depression with anxiety     Osteoarthritis of hip     Herniated intervertebral disc     Past Medical History:   Diagnosis Date     Arrhythmia      Breast cancer (H) 2017     CHF (congestive heart failure) (H)      COPD (chronic obstructive pulmonary disease) (H)      Coronary artery disease      GERD (gastroesophageal reflux disease)      Hx of radiation therapy 2017     Hyperlipidemia      Hypertension      Idiopathic cardiomyopathy (H)        Past Social History     Reviewed, and she  reports that she quit smoking about 13 years ago. She quit smokeless tobacco use about 16 years ago. She reports that she does not drink alcohol or use drugs.    Family History     Reviewed, and family history includes Breast cancer in her maternal aunt; Osteoporosis in an other family member; Prostate cancer in her brother and maternal uncle.    Medication  List   Post Discharge Medication Reconciliation Status: discharge medications reconciled, continue medications without change   Current Outpatient Medications on File Prior to Visit   Medication Sig Dispense Refill     acetaminophen (TYLENOL) 325 MG tablet Take 2 tablets (650 mg total) by mouth 3 (three) times a day. (Patient taking differently: Take 650 mg by mouth 4 (four) times a day. )  0     ADVAIR DISKUS 500-50 mcg/dose DISKUS USE 1 INHALATION TWICE A DAY 60 each 11     albuterol (PROAIR HFA;PROVENTIL HFA;VENTOLIN HFA) 90 mcg/actuation inhaler Inhale 2 puffs every 4 (four) hours as needed for wheezing. 1 Inhaler 0     ascorbic acid, vitamin C, (VITAMIN C) 1000 MG tablet Take 1,000 mg by mouth daily. Airborne 1 tab daily       aspirin 81 MG EC tablet Take 81 mg by mouth daily.       azelastine (ASTELIN) 137 mcg (0.1 %) nasal spray 2 sprays into each nostril daily.       benzonatate (TESSALON) 200 MG capsule Take 1 capsule (200 mg total) by mouth 3 (three) times a day as needed for cough. 90 capsule 2     celecoxib (CELEBREX) 200 MG capsule Take 200 mg by mouth 2 (two) times a day.       chlorhexidine (PERIDEX) 0.12 % solution Apply 15 mL to the mouth or throat at bedtime as needed.        cholecalciferol, vitamin D3, 1,000 unit tablet Take 1,000 Units by mouth daily.       diclofenac sodium (VOLTAREN) 1 % Gel Apply 4 g to area of pain in low back 4 times daily  0     EPINEPHrine (EPIPEN/ADRENACLICK/AUVI-Q) 0.3 mg/0.3 mL injection Inject 0.3 mg into the shoulder, thigh, or buttocks.       famotidine (PEPCID) 20 MG tablet Take 20 mg by mouth daily.       fluticasone (FLONASE) 50 mcg/actuation nasal spray Apply 1 spray into each nostril as needed.        furosemide (LASIX) 40 MG tablet Take 1 tablet (40 mg total) by mouth 2 (two) times a day at 9am and 6pm.  0     gabapentin (NEURONTIN) 300 MG capsule Take 2 capsules (600 mg total) by mouth 2 (two) times a day. 360 capsule 2     HYDROcodone-acetaminophen 5-325 mg  per tablet Take 1 tablet by mouth every 4 (four) hours as needed. 90 tablet 0     ipratropium (ATROVENT HFA) 17 mcg/actuation inhaler Inhale 2 puffs every 6 (six) hours. 3 Inhaler 3     ipratropium-albuterol (DUO-NEB) 0.5-2.5 mg/3 mL nebulizer 1 neb 4 times a day until seen by primary and then as needed or as directed 60 vial 1     lidocaine (XYLOCAINE) 5 % ointment Apply to area of pain in low back 4 times daily 35.44 g 0     losartan (COZAAR) 50 MG tablet TAKE 1 TABLET DAILY 90 tablet 3     melatonin 3 mg Tab tablet Take 1 tablet (3 mg total) by mouth at bedtime.  0     metoprolol succinate (TOPROL-XL) 50 MG 24 hr tablet Take 1.5 tablets (75 mg total) by mouth daily. 135 tablet 3     mirabegron (MYRBETRIQ) 50 mg Tb24 ER tablet Take 1 tablet (50 mg total) by mouth daily. 90 tablet 3     omalizumab (XOLAIR) 150 mg/mL Syrg injection Inject 300 mg under the skin every 14 (fourteen) days.        pantoprazole (PROTONIX) 40 MG tablet Take 1 tablet (40 mg total) by mouth daily. 90 tablet 4     polyethylene glycol (MIRALAX) 17 gram packet Take 1 packet (17 g total) by mouth daily as needed.  0     QUEtiapine (SEROQUEL) 25 MG tablet Take 0.5 tablets (12.5 mg total) by mouth 3 (three) times a day as needed (Agitation that is not redirectable.).  0     rivaroxaban ANTICOAGULANT (XARELTO) 20 mg tablet Take 20 mg by mouth at bedtime.       senna-docusate (PERICOLACE) 8.6-50 mg tablet Take 1 tablet by mouth daily as needed for constipation.  0     simvastatin (ZOCOR) 40 MG tablet Take 1 tablet (40 mg total) by mouth at bedtime. 90 tablet 3     sodium chloride (OCEAN) 0.65 % nasal spray 2 sprays into each nostril as needed.       solifenacin (VESICARE) 5 MG tablet Take 1 tablet (5 mg total) by mouth at bedtime. 90 tablet 3     tamoxifen (NOLVADEX) 20 MG tablet TAKE 1 TABLET DAILY 90 tablet 4     traZODone (DESYREL) 50 MG tablet TAKE 1 TABLET(50 MG) BY MOUTH AT BEDTIME AS NEEDED 90 tablet 2     triamcinolone (KENALOG) 0.1 % cream  Apply to rash on arms twice daily for one week 30 g 0     triamcinolone (KENALOG) 0.5 % ointment Apply o foot twice a day 30 g 1     venlafaxine (EFFEXOR-XR) 150 MG 24 hr capsule TAKE 1 CAPSULE DAILY 90 capsule 4     Current Facility-Administered Medications on File Prior to Visit   Medication Dose Route Frequency Provider Last Rate Last Dose     denosumab 60 mg (PROLIA 60 mg/ml)  60 mg Subcutaneous Q6 Months Suzanna Shane MD   60 mg at 02/04/20 1352       Allergies     Allergies   Allergen Reactions     Sulfa (Sulfonamide Antibiotics) Rash     Headaches and dizziness.     Homeopathic Drugs Unknown and Other (See Comments)     Comment: runny nose, Comment: runny nose     Mold Extracts      Morphine (Pf)      hallucinate     Lisinopril Cough, Unknown and Itching     Comment: cough, Comment: cough     Sulfacetamide Sodium Rash       Review of Systems   A comprehensive review of 14 systems was done. Pertinent findings noted here and in history of present illness. All the rest negative.  Constitutional: Negative.  Negative for fever, chills, she has activity change, appetite change and fatigue.   HENT: Negative for congestion and facial swelling.    Eyes: Negative for photophobia, redness and visual disturbance.   Respiratory: Negative for cough and chest tightness.    Cardiovascular: Negative for chest pain, palpitations and leg swelling.   Gastrointestinal: Negative for nausea, diarrhea, constipation, blood in stool and abdominal distention.   Genitourinary: Negative.    Musculoskeletal: Negative.  Pain in her low back; today reporting intractable muscle spasms which are not improving  Has piriformis pain  Skin: Negative.    Neurological: Negative for dizziness, tremors, syncope, weakness, light-headedness and headaches.   Hematological: Does not bruise/bleed easily.   Psychiatric/Behavioral: Negative.  Significant distress due to pain      Physical Exam     Recent Vitals 10/24/2020   Height -   Weight -   BSA (m2)  -   /88   Pulse 85   Temp 97.5   Temp src 1   SpO2 92   Some recent data might be hidden     Currently on 2 L of oxygen by nasal cannula wt 142lb  R/C /67  Constitutional: Oriented to person, place, and time and appears well-developed.   HEENT:  Normocephalic and atraumatic.  Eyes: Conjunctivae and EOM are normal. Pupils are equal, round, and reactive to light. No discharge.  No scleral icterus. Nose normal. Mouth/Throat: Oropharynx is clear and moist. No oropharyngeal exudate.    NECK: Normal range of motion. Neck supple. No JVD present. No tracheal deviation present. No thyromegaly present.   CARDIOVASCULAR: Normal rate, regular rhythm and intact distal pulses.  Exam reveals no gallop and no friction rub.  Systolic murmur present.  PULMONARY: Effort normal and breath sounds normal. No respiratory distress.No Wheezing or rales.  ABDOMEN: Soft. Bowel sounds are normal. No distension and no mass.  There is no tenderness. There is no rebound and no guarding. No HSM.  MUSCULOSKELETAL: Normal range of motion. No edema and no tenderness. Mild kyphosis,  tenderness  to palpation on her lower thoracic spine  Mobility is limited.  LYMPH NODES: Has no cervical, supraclavicular, axillary and groin adenopathy.   NEUROLOGICAL: Alert and oriented to person, place, and time. No cranial nerve deficit.  Normal muscle tone. Coordination normal.   GENITOURINARY: Deferred exam.  SKIN: Skin is warm and dry. No rash noted. No erythema. No pallor.   EXTREMITIES: No cyanosis, no clubbing, no edema. No Deformity.  PSYCHIATRIC: Normal mood, affect and behavior.  High anxiety due to pain      Lab Results     Recent Results (from the past 240 hour(s))   COVID-19 Virus PCR MRF    Collection Time: 11/09/20  8:00 AM    Specimen: Respiratory   Result Value Ref Range    COVID-19 VIRUS SPECIMEN SOURCE Nasopharyngeal     2019-nCOV Not Detected                   VASHTI Sanchez

## 2021-06-13 NOTE — PATIENT INSTRUCTIONS - HE
Continue current treatment plan.  Continue with your TLSO brace.    If pain becomes severe and you are unable to manage at home, I would direct you to the emergency room for further evaluation, and option to return to TCU.    Follow-up on December 10 as planned to discuss hospital follow-up.

## 2021-06-13 NOTE — TELEPHONE ENCOUNTER
Call from [facility] for:     Occupational therapy one time a week for 6 weeks    [place] per Dr. Montanez.  Home Care notified.    Dottie Grove CMA ............... 1:29 PM, 12/21/20

## 2021-06-13 NOTE — PROGRESS NOTES
[facility] Admission note      Patient: Irene León  MRN: [id number]  Date of Service: 11/25/2020      [facility] [[id number]]  Reason for Visit     Chief Complaint   Patient presents with     Review Of Multiple Medical Conditions   F/U PAIN /MUSCLE SPASMS/ chf    Code Status     FULL CODE    Assessment     -Acute CHF exacerbation with persistent weight gain in spite of being on a higher dose of Lasix; now improved  -Ongoing concerns about cough and congestion with hypoxic respiratory failure  - pain management  -T12 compression fracture  -ANATOLIY with elevated creatinine 1.4 in the TCU  -Osteoporosis restarted on Prolia  -Pain management no longer on narcotics; no discharge on narcotics including Percocet  -Acute metabolic encephalopathy/delirium felt to be secondary to opioid medication  -CHF with reduced ejection fraction acute exacerbation  -- ANATOLIY /cardiorenal syndrome  -History of COPD and asthma  -Acute on chronic hypoxic respiratory failure ON 3L BY NC  -Peripheral arterial disease  -History of factor V Leiden and occipital stroke on Xarelto  -History of breast cancer  -Concern for mild dementia with baseline cognitive impairment  Slums 13/30  -Generalized weakness      Plan     Patient has been admitted to the TCU for strengthening and rehab  Patient continues to have issues with pain management with intractable low back and hip pain.  She has done a follow-up with orthopedics and has been given a referral for kyphoplasty.  Patient actually is reporting a significant improvement in pain and improvement in mobility  She remains on scheduled Tylenol.  Celebrex has been discontinued.  This was done unfortunately due to a nursing error and it was stopped earlier than requested.  She is on a lower dose of Norco every 8 hours instead of every 4 and is sparingly requesting it.  She reports an improvement and is now debating if she should go in for a kyphoplasty.  Encourage  compliance with oxygen frequently seen sitting at the floor.  Weight stable currently on Bumex 2 mg for maintenance.  Recheck BMP stable.  Now pushing for an early discharge home         History     Patient is a very pleasant 75 y.o. female who is admitted to TCU.  Admitted with a T12 compression fracture  She has been discharged weightbearing as tolerated with a TLSO brace to be worn all day  She did have a follow-up with orthopedics.  Based on the recommendation she was given an appointment to follow-up with orthopedics for kyphoplasty and injection in her piriformis.  Her daughter is trying to schedule it.  Her brace has been adjusted   Overall patient is ambulating better with improvement in bed mobility noted.  Pain management reviewed with her.  Recently her Celebrex was discontinued due to a nursing error.  In addition she is no longer on high doses of Norco and has tolerated a reduction to every 8 hours from every 4 hours quite well.  She is on scheduled Tylenol.  Overall reporting a significant improvement in her pain.  She also had recent exacerbation of CHF for which she was given high doses of Bumex.  BMPs have been stable she is reporting her cough and congestion have improved.  Now down to 142 pounds which is almost close to 8 pound weight loss  She is back to her baseline oxygen needs which is 2 to 3 L.  Cognitively she has some impairment but overall has been stable she appears to be mildly forgetful.  Physically she is to build feels she is walking better.  She feels she is ready to go home and would like to go home soon.  She does take care of her  who has mild dementia as per her      Past Medical History     Active Ambulatory (Non-Hospital) Problems    Diagnosis     Pain due to fracture     High risk medication use     Heart failure exacerbated by sotalol (H)     T12 compression fracture (H)     Acute on chronic diastolic congestive heart failure (H)     Metabolic encephalopathy     Low  back pain     Compression fracture of L1 vertebra, sequela     Chronic respiratory failure with hypoxia (H)     Chronic obstructive pulmonary disease, unspecified COPD type (H)     Chronic systolic congestive heart failure (H)     Hypoxia     S/P hip replacement, left     Hip pain, left     Acute hypoxemic respiratory failure (H)     Chest pain     COPD exacerbation (H)     Herpes zoster without complication     Chest wall pain     Benign essential hypertension     Dyslipidemia     Acute respiratory failure with hypoxia (H)     Pulsatile tinnitus of both ears     Hot flashes due to tamoxifen     Pleural nodules     Hot flashes, menopausal     Malignant neoplasm of central portion of left female breast (H)     CKD (chronic kidney disease) stage 3, GFR 30-59 ml/min     Dyspnea     Insomnia     Leg pain, left     Femur fracture (H)     GERD (gastroesophageal reflux disease)     Post menopausal syndrome     OP (osteoporosis)     Hypertension     Pulmonary emphysema, unspecified emphysema type (H)     Hyperlipidemia     Centrilobular emphysema (H)     Anisocoria     OAB (overactive bladder)     Factor 5 Leiden mutation, heterozygous (H)     Family history of blood disease     Bladder incontinence     Physical deconditioning     Cardiomyopathy, idiopathic (H)     Depression with anxiety     Osteoarthritis of hip     Herniated intervertebral disc     Past Medical History:   Diagnosis Date     Arrhythmia      Breast cancer (H) 2017     CHF (congestive heart failure) (H)      COPD (chronic obstructive pulmonary disease) (H)      Coronary artery disease      GERD (gastroesophageal reflux disease)      Hx of radiation therapy 2017     Hyperlipidemia      Hypertension      Idiopathic cardiomyopathy (H)        Past Social History     Reviewed, and she  reports that she quit smoking about 13 years ago. She quit smokeless tobacco use about 16 years ago. She reports that she does not drink alcohol or use drugs.    Family History      Reviewed, and family history includes Breast cancer in her maternal aunt; Osteoporosis in an other family member; Prostate cancer in her brother and maternal uncle.    Medication List   Post Discharge Medication Reconciliation Status: discharge medications reconciled, continue medications without change   Current Outpatient Medications on File Prior to Visit   Medication Sig Dispense Refill     acetaminophen (TYLENOL) 325 MG tablet Take 2 tablets (650 mg total) by mouth 3 (three) times a day. (Patient taking differently: Take 650 mg by mouth 4 (four) times a day. )  0     ADVAIR DISKUS 500-50 mcg/dose DISKUS USE 1 INHALATION TWICE A DAY 60 each 11     albuterol (PROAIR HFA;PROVENTIL HFA;VENTOLIN HFA) 90 mcg/actuation inhaler Inhale 2 puffs every 4 (four) hours as needed for wheezing. 1 Inhaler 0     ascorbic acid, vitamin C, (VITAMIN C) 1000 MG tablet Take 1,000 mg by mouth daily. Airborne 1 tab daily       aspirin 81 MG EC tablet Take 81 mg by mouth daily.       azelastine (ASTELIN) 137 mcg (0.1 %) nasal spray 2 sprays into each nostril daily.       benzonatate (TESSALON) 200 MG capsule Take 1 capsule (200 mg total) by mouth 3 (three) times a day as needed for cough. 90 capsule 2     chlorhexidine (PERIDEX) 0.12 % solution Apply 15 mL to the mouth or throat at bedtime as needed.        cholecalciferol, vitamin D3, 1,000 unit tablet Take 1,000 Units by mouth daily.       diclofenac sodium (VOLTAREN) 1 % Gel Apply 4 g to area of pain in low back 4 times daily  0     EPINEPHrine (EPIPEN/ADRENACLICK/AUVI-Q) 0.3 mg/0.3 mL injection Inject 0.3 mg into the shoulder, thigh, or buttocks.       famotidine (PEPCID) 20 MG tablet Take 20 mg by mouth daily.       fluticasone (FLONASE) 50 mcg/actuation nasal spray Apply 1 spray into each nostril as needed.        furosemide (LASIX) 40 MG tablet Take 1 tablet (40 mg total) by mouth 2 (two) times a day at 9am and 6pm.  0     gabapentin (NEURONTIN) 300 MG capsule Take 2 capsules  (600 mg total) by mouth 2 (two) times a day. 360 capsule 2     HYDROcodone-acetaminophen 5-325 mg per tablet Take 1 tablet by mouth every 4 (four) hours as needed. 90 tablet 0     ipratropium (ATROVENT HFA) 17 mcg/actuation inhaler Inhale 2 puffs every 6 (six) hours. 3 Inhaler 3     ipratropium-albuterol (DUO-NEB) 0.5-2.5 mg/3 mL nebulizer 1 neb 4 times a day until seen by primary and then as needed or as directed 60 vial 1     lidocaine (XYLOCAINE) 5 % ointment Apply to area of pain in low back 4 times daily 35.44 g 0     losartan (COZAAR) 50 MG tablet TAKE 1 TABLET DAILY 90 tablet 3     melatonin 3 mg Tab tablet Take 1 tablet (3 mg total) by mouth at bedtime.  0     metoprolol succinate (TOPROL-XL) 50 MG 24 hr tablet Take 1.5 tablets (75 mg total) by mouth daily. 135 tablet 3     mirabegron (MYRBETRIQ) 50 mg Tb24 ER tablet Take 1 tablet (50 mg total) by mouth daily. 90 tablet 3     omalizumab (XOLAIR) 150 mg/mL Syrg injection Inject 300 mg under the skin every 14 (fourteen) days.        pantoprazole (PROTONIX) 40 MG tablet Take 1 tablet (40 mg total) by mouth daily. 90 tablet 4     polyethylene glycol (MIRALAX) 17 gram packet Take 1 packet (17 g total) by mouth daily as needed.  0     QUEtiapine (SEROQUEL) 25 MG tablet Take 0.5 tablets (12.5 mg total) by mouth 3 (three) times a day as needed (Agitation that is not redirectable.).  0     rivaroxaban ANTICOAGULANT (XARELTO) 20 mg tablet Take 20 mg by mouth at bedtime.       senna-docusate (PERICOLACE) 8.6-50 mg tablet Take 1 tablet by mouth daily as needed for constipation.  0     simvastatin (ZOCOR) 40 MG tablet Take 1 tablet (40 mg total) by mouth at bedtime. 90 tablet 3     sodium chloride (OCEAN) 0.65 % nasal spray 2 sprays into each nostril as needed.       solifenacin (VESICARE) 5 MG tablet Take 1 tablet (5 mg total) by mouth at bedtime. 90 tablet 3     tamoxifen (NOLVADEX) 20 MG tablet TAKE 1 TABLET DAILY 90 tablet 4     traZODone (DESYREL) 50 MG tablet TAKE  1 TABLET(50 MG) BY MOUTH AT BEDTIME AS NEEDED 90 tablet 2     triamcinolone (KENALOG) 0.1 % cream Apply to rash on arms twice daily for one week 30 g 0     triamcinolone (KENALOG) 0.5 % ointment Apply o foot twice a day 30 g 1     venlafaxine (EFFEXOR-XR) 150 MG 24 hr capsule TAKE 1 CAPSULE DAILY 90 capsule 4     Current Facility-Administered Medications on File Prior to Visit   Medication Dose Route Frequency Provider Last Rate Last Admin     denosumab 60 mg (PROLIA 60 mg/ml)  60 mg Subcutaneous Q6 Months Suzanna Shane MD   60 mg at 02/04/20 1352       Allergies     Allergies   Allergen Reactions     Sulfa (Sulfonamide Antibiotics) Rash     Headaches and dizziness.     Homeopathic Drugs Unknown and Other (See Comments)     Comment: runny nose, Comment: runny nose     Mold Extracts      Morphine (Pf)      hallucinate     Lisinopril Cough, Unknown and Itching     Comment: cough, Comment: cough     Sulfacetamide Sodium Rash       Review of Systems   A comprehensive review of 14 systems was done. Pertinent findings noted here and in history of present illness. All the rest negative.  Constitutional: Negative.  Negative for fever, chills, she has activity change, appetite change and fatigue.   HENT: Negative for congestion and facial swelling.    Eyes: Negative for photophobia, redness and visual disturbance.   Respiratory: Negative for cough and chest tightness.    Cardiovascular: Negative for chest pain, palpitations and leg swelling.   Gastrointestinal: Negative for nausea, diarrhea, constipation, blood in stool and abdominal distention.   Genitourinary: Negative.    Musculoskeletal: Negative.  Pain in her low back; today reporting  muscle spasms which are  improving  Has piriformis pain which also feels better  Skin: Negative.    Neurological: Negative for dizziness, tremors, syncope, weakness, light-headedness and headaches.   Hematological: Does not bruise/bleed easily.   Psychiatric/Behavioral: Negative.   Significant distress due to pain is improved and feels much better      Physical Exam     Recent Vitals 10/24/2020   Height -   Weight -   BSA (m2) -   /88   Pulse 85   Temp 97.5   Temp src 1   SpO2 92   Some recent data might be hidden     Currently on 2 L of oxygen by nasal cannula wt 142lb  R/C /67  Constitutional: Oriented to person, place, and time and appears well-developed.   HEENT:  Normocephalic and atraumatic.  Eyes: Conjunctivae and EOM are normal. Pupils are equal, round, and reactive to light. No discharge.  No scleral icterus. Nose normal. Mouth/Throat: Oropharynx is clear and moist. No oropharyngeal exudate.    NECK: Normal range of motion. Neck supple. No JVD present. No tracheal deviation present. No thyromegaly present.   CARDIOVASCULAR: Normal rate, regular rhythm and intact distal pulses.  Exam reveals no gallop and no friction rub.  Systolic murmur present.  PULMONARY: Effort normal and breath sounds normal. No respiratory distress.No Wheezing or rales.  ABDOMEN: Soft. Bowel sounds are normal. No distension and no mass.  There is no tenderness. There is no rebound and no guarding. No HSM.  MUSCULOSKELETAL: Normal range of motion. No edema and no tenderness. Mild kyphosis, no tenderness.  Mobility in the bed has improved significantly  LYMPH NODES: Has no cervical, supraclavicular, axillary and groin adenopathy.   NEUROLOGICAL: Alert and oriented to person, place, and time. No cranial nerve deficit.  Normal muscle tone. Coordination normal.   GENITOURINARY: Deferred exam.  SKIN: Skin is warm and dry. No rash noted. No erythema. No pallor.   EXTREMITIES: No cyanosis, no clubbing, no edema. No Deformity.  PSYCHIATRIC: Normal mood, affect and behavior.        Lab Results     Recent Results (from the past 240 hour(s))   COVID-19 Virus PCR MRF    Collection Time: 11/17/20  7:00 AM    Specimen: Respiratory   Result Value Ref Range    COVID-19 VIRUS SPECIMEN SOURCE Nasopharyngeal      2019-nCOV Not Detected    Basic Metabolic Panel    Collection Time: 11/18/20 12:10 PM   Result Value Ref Range    Sodium 140 136 - 145 mmol/L    Potassium 4.0 3.5 - 5.0 mmol/L    Chloride 103 98 - 107 mmol/L    CO2 23 22 - 31 mmol/L    Anion Gap, Calculation 14 5 - 18 mmol/L    Glucose 70 70 - 125 mg/dL    Calcium 9.4 8.5 - 10.5 mg/dL    BUN 17 8 - 28 mg/dL    Creatinine 1.19 (H) 0.60 - 1.10 mg/dL    GFR MDRD Af Amer 54 (L) >60 mL/min/1.73m2    GFR MDRD Non Af Amer 44 (L) >60 mL/min/1.73m2                  VASHTI Sanchez

## 2021-06-13 NOTE — PROGRESS NOTES
[facility] Admission note      Patient: Irene León  MRN: [id number]  Date of Service: 11/18/2020      [facility] [[id number]]  Reason for Visit     Chief Complaint   Patient presents with     Review Of Multiple Medical Conditions   F/U PAIN /MUSCLE SPASMS/ chf    Code Status     FULL CODE    Assessment     -Acute CHF exacerbation with persistent weight gain in spite of being on a higher dose of Lasix; now improved  -Ongoing concerns about cough and congestion with hypoxic respiratory failure  - pain management  -T12 compression fracture  -ANATOLIY with elevated creatinine 1.4 in the TCU  -Osteoporosis restarted on Prolia  -Pain management no longer on narcotics; no discharge on narcotics including Percocet  -Acute metabolic encephalopathy/delirium felt to be secondary to opioid medication  -CHF with reduced ejection fraction acute exacerbation  -- ANATOLIY /cardiorenal syndrome  -History of COPD and asthma  -Acute on chronic hypoxic respiratory failure ON 3L BY NC  -Peripheral arterial disease  -History of factor V Leiden and occipital stroke on Xarelto  -History of breast cancer  -Concern for mild dementia with baseline cognitive impairment  Slums 13/30  -Generalized weakness      Plan     Patient has been admitted to the TCU for strengthening and rehab  Patient continues to have issues with pain management with intractable low back and hip pain.  She has done a follow-up with orthopedics and has been given a referral for kyphoplasty.  She is also requesting that her back brace be adjusted because it is causing her significant distress and pain.  However she is now grudgingly admitted that her pain may be now under better control.  However she still does not want her narcotics tapered.  Will await her follow-up appointment with orthopedics for kyphoplasty before discontinuation of medication or tapering her.  Weights have been stable at around 144 pounds and she continues on a lower  regimen of Bumex.  Recheck BMP has been stable and her CHF issues have resolved nicely.  Recheck BMP today shows stable electrolytes improvement in creatinine to 1.1  Continue with her baseline oxygen needs    History     Patient is a very pleasant 75 y.o. female who is admitted to TCU.  Admitted with a T12 compression fracture  She has been discharged weightbearing as tolerated with a TLSO brace to be worn all day  She did have a follow-up with orthopedics.  Based on the recommendation she was given an appointment to follow-up with orthopedics for kyphoplasty and injection in her piriformis.  Her daughter is trying to schedule it.  Currently she is awaiting that appointment  She is compliant with her TLSO bracing.  Call has been made out to her brace company to adjusted as she feels part of her pain also stems from improper fitting brace.  Pain management reviewed and she did admit today that she feels her pain is slowly improving however she is still not ready for any narcotic taper.  She also had recent exacerbation of CHF for which she was given high doses of Bumex.  BMPs have been stable she is reporting her cough and congestion have improved.  Now down to 142 pounds which is almost close to 8 pound weight loss  She is back to her baseline oxygen needs which is 2 to 3 L.  Cognitively she has some impairment but overall has been stable she appears to be mildly forgetful.  Physically remains quite debilitated and wheelchair-bound      Past Medical History     Active Ambulatory (Non-Hospital) Problems    Diagnosis     Pain due to fracture     High risk medication use     Heart failure exacerbated by sotalol (H)     T12 compression fracture (H)     Acute on chronic diastolic congestive heart failure (H)     Metabolic encephalopathy     Low back pain     Compression fracture of L1 vertebra, sequela     Chronic respiratory failure with hypoxia (H)     Chronic obstructive pulmonary disease, unspecified COPD type (H)      Chronic systolic congestive heart failure (H)     Hypoxia     S/P hip replacement, left     Hip pain, left     Acute hypoxemic respiratory failure (H)     Chest pain     COPD exacerbation (H)     Herpes zoster without complication     Chest wall pain     Benign essential hypertension     Dyslipidemia     Acute respiratory failure with hypoxia (H)     Pulsatile tinnitus of both ears     Hot flashes due to tamoxifen     Pleural nodules     Hot flashes, menopausal     Malignant neoplasm of central portion of left female breast (H)     CKD (chronic kidney disease) stage 3, GFR 30-59 ml/min     Dyspnea     Insomnia     Leg pain, left     Femur fracture (H)     GERD (gastroesophageal reflux disease)     Post menopausal syndrome     OP (osteoporosis)     Hypertension     Pulmonary emphysema, unspecified emphysema type (H)     Hyperlipidemia     Centrilobular emphysema (H)     Anisocoria     OAB (overactive bladder)     Factor 5 Leiden mutation, heterozygous (H)     Family history of blood disease     Bladder incontinence     Physical deconditioning     Cardiomyopathy, idiopathic (H)     Depression with anxiety     Osteoarthritis of hip     Herniated intervertebral disc     Past Medical History:   Diagnosis Date     Arrhythmia      Breast cancer (H) 2017     CHF (congestive heart failure) (H)      COPD (chronic obstructive pulmonary disease) (H)      Coronary artery disease      GERD (gastroesophageal reflux disease)      Hx of radiation therapy 2017     Hyperlipidemia      Hypertension      Idiopathic cardiomyopathy (H)        Past Social History     Reviewed, and she  reports that she quit smoking about 13 years ago. She quit smokeless tobacco use about 16 years ago. She reports that she does not drink alcohol or use drugs.    Family History     Reviewed, and family history includes Breast cancer in her maternal aunt; Osteoporosis in an other family member; Prostate cancer in her brother and maternal  .    Medication List   Post Discharge Medication Reconciliation Status: discharge medications reconciled, continue medications without change   Current Outpatient Medications on File Prior to Visit   Medication Sig Dispense Refill     acetaminophen (TYLENOL) 325 MG tablet Take 2 tablets (650 mg total) by mouth 3 (three) times a day. (Patient taking differently: Take 650 mg by mouth 4 (four) times a day. )  0     ADVAIR DISKUS 500-50 mcg/dose DISKUS USE 1 INHALATION TWICE A DAY 60 each 11     albuterol (PROAIR HFA;PROVENTIL HFA;VENTOLIN HFA) 90 mcg/actuation inhaler Inhale 2 puffs every 4 (four) hours as needed for wheezing. 1 Inhaler 0     ascorbic acid, vitamin C, (VITAMIN C) 1000 MG tablet Take 1,000 mg by mouth daily. Airborne 1 tab daily       aspirin 81 MG EC tablet Take 81 mg by mouth daily.       azelastine (ASTELIN) 137 mcg (0.1 %) nasal spray 2 sprays into each nostril daily.       benzonatate (TESSALON) 200 MG capsule Take 1 capsule (200 mg total) by mouth 3 (three) times a day as needed for cough. 90 capsule 2     celecoxib (CELEBREX) 200 MG capsule Take 200 mg by mouth 2 (two) times a day.       chlorhexidine (PERIDEX) 0.12 % solution Apply 15 mL to the mouth or throat at bedtime as needed.        cholecalciferol, vitamin D3, 1,000 unit tablet Take 1,000 Units by mouth daily.       diclofenac sodium (VOLTAREN) 1 % Gel Apply 4 g to area of pain in low back 4 times daily  0     EPINEPHrine (EPIPEN/ADRENACLICK/AUVI-Q) 0.3 mg/0.3 mL injection Inject 0.3 mg into the shoulder, thigh, or buttocks.       famotidine (PEPCID) 20 MG tablet Take 20 mg by mouth daily.       fluticasone (FLONASE) 50 mcg/actuation nasal spray Apply 1 spray into each nostril as needed.        furosemide (LASIX) 40 MG tablet Take 1 tablet (40 mg total) by mouth 2 (two) times a day at 9am and 6pm.  0     gabapentin (NEURONTIN) 300 MG capsule Take 2 capsules (600 mg total) by mouth 2 (two) times a day. 360 capsule 2      HYDROcodone-acetaminophen 5-325 mg per tablet Take 1 tablet by mouth every 4 (four) hours as needed. 90 tablet 0     ipratropium (ATROVENT HFA) 17 mcg/actuation inhaler Inhale 2 puffs every 6 (six) hours. 3 Inhaler 3     ipratropium-albuterol (DUO-NEB) 0.5-2.5 mg/3 mL nebulizer 1 neb 4 times a day until seen by primary and then as needed or as directed 60 vial 1     lidocaine (XYLOCAINE) 5 % ointment Apply to area of pain in low back 4 times daily 35.44 g 0     losartan (COZAAR) 50 MG tablet TAKE 1 TABLET DAILY 90 tablet 3     melatonin 3 mg Tab tablet Take 1 tablet (3 mg total) by mouth at bedtime.  0     metoprolol succinate (TOPROL-XL) 50 MG 24 hr tablet Take 1.5 tablets (75 mg total) by mouth daily. 135 tablet 3     mirabegron (MYRBETRIQ) 50 mg Tb24 ER tablet Take 1 tablet (50 mg total) by mouth daily. 90 tablet 3     omalizumab (XOLAIR) 150 mg/mL Syrg injection Inject 300 mg under the skin every 14 (fourteen) days.        pantoprazole (PROTONIX) 40 MG tablet Take 1 tablet (40 mg total) by mouth daily. 90 tablet 4     polyethylene glycol (MIRALAX) 17 gram packet Take 1 packet (17 g total) by mouth daily as needed.  0     QUEtiapine (SEROQUEL) 25 MG tablet Take 0.5 tablets (12.5 mg total) by mouth 3 (three) times a day as needed (Agitation that is not redirectable.).  0     rivaroxaban ANTICOAGULANT (XARELTO) 20 mg tablet Take 20 mg by mouth at bedtime.       senna-docusate (PERICOLACE) 8.6-50 mg tablet Take 1 tablet by mouth daily as needed for constipation.  0     simvastatin (ZOCOR) 40 MG tablet Take 1 tablet (40 mg total) by mouth at bedtime. 90 tablet 3     sodium chloride (OCEAN) 0.65 % nasal spray 2 sprays into each nostril as needed.       solifenacin (VESICARE) 5 MG tablet Take 1 tablet (5 mg total) by mouth at bedtime. 90 tablet 3     tamoxifen (NOLVADEX) 20 MG tablet TAKE 1 TABLET DAILY 90 tablet 4     traZODone (DESYREL) 50 MG tablet TAKE 1 TABLET(50 MG) BY MOUTH AT BEDTIME AS NEEDED 90 tablet 2      triamcinolone (KENALOG) 0.1 % cream Apply to rash on arms twice daily for one week 30 g 0     triamcinolone (KENALOG) 0.5 % ointment Apply o foot twice a day 30 g 1     venlafaxine (EFFEXOR-XR) 150 MG 24 hr capsule TAKE 1 CAPSULE DAILY 90 capsule 4     Current Facility-Administered Medications on File Prior to Visit   Medication Dose Route Frequency Provider Last Rate Last Admin     denosumab 60 mg (PROLIA 60 mg/ml)  60 mg Subcutaneous Q6 Months Suzanna Shane MD   60 mg at 02/04/20 1352       Allergies     Allergies   Allergen Reactions     Sulfa (Sulfonamide Antibiotics) Rash     Headaches and dizziness.     Homeopathic Drugs Unknown and Other (See Comments)     Comment: runny nose, Comment: runny nose     Mold Extracts      Morphine (Pf)      hallucinate     Lisinopril Cough, Unknown and Itching     Comment: cough, Comment: cough     Sulfacetamide Sodium Rash       Review of Systems   A comprehensive review of 14 systems was done. Pertinent findings noted here and in history of present illness. All the rest negative.  Constitutional: Negative.  Negative for fever, chills, she has activity change, appetite change and fatigue.   HENT: Negative for congestion and facial swelling.    Eyes: Negative for photophobia, redness and visual disturbance.   Respiratory: Negative for cough and chest tightness.    Cardiovascular: Negative for chest pain, palpitations and leg swelling.   Gastrointestinal: Negative for nausea, diarrhea, constipation, blood in stool and abdominal distention.   Genitourinary: Negative.    Musculoskeletal: Negative.  Pain in her low back; today reporting intractable muscle spasms which are not improving  Has piriformis pain  Skin: Negative.    Neurological: Negative for dizziness, tremors, syncope, weakness, light-headedness and headaches.   Hematological: Does not bruise/bleed easily.   Psychiatric/Behavioral: Negative.  Significant distress due to pain      Physical Exam     Recent Vitals  10/24/2020   Height -   Weight -   BSA (m2) -   /88   Pulse 85   Temp 97.5   Temp src 1   SpO2 92   Some recent data might be hidden     Currently on 2 L of oxygen by nasal cannula wt 142lb  R/C /67  Constitutional: Oriented to person, place, and time and appears well-developed.   HEENT:  Normocephalic and atraumatic.  Eyes: Conjunctivae and EOM are normal. Pupils are equal, round, and reactive to light. No discharge.  No scleral icterus. Nose normal. Mouth/Throat: Oropharynx is clear and moist. No oropharyngeal exudate.    NECK: Normal range of motion. Neck supple. No JVD present. No tracheal deviation present. No thyromegaly present.   CARDIOVASCULAR: Normal rate, regular rhythm and intact distal pulses.  Exam reveals no gallop and no friction rub.  Systolic murmur present.  PULMONARY: Effort normal and breath sounds normal. No respiratory distress.No Wheezing or rales.  ABDOMEN: Soft. Bowel sounds are normal. No distension and no mass.  There is no tenderness. There is no rebound and no guarding. No HSM.  MUSCULOSKELETAL: Normal range of motion. No edema and no tenderness. Mild kyphosis,  tenderness  to palpation on her lower thoracic spine  Mobility is limited.  LYMPH NODES: Has no cervical, supraclavicular, axillary and groin adenopathy.   NEUROLOGICAL: Alert and oriented to person, place, and time. No cranial nerve deficit.  Normal muscle tone. Coordination normal.   GENITOURINARY: Deferred exam.  SKIN: Skin is warm and dry. No rash noted. No erythema. No pallor.   EXTREMITIES: No cyanosis, no clubbing, no edema. No Deformity.  PSYCHIATRIC: Normal mood, affect and behavior.  High anxiety due to pain      Lab Results     Recent Results (from the past 240 hour(s))   COVID-19 Virus PCR MRF    Collection Time: 11/09/20  8:00 AM    Specimen: Respiratory   Result Value Ref Range    COVID-19 VIRUS SPECIMEN SOURCE Nasopharyngeal     2019-nCOV Not Detected    COVID-19 Virus PCR MRF    Collection Time:  11/17/20  7:00 AM    Specimen: Respiratory   Result Value Ref Range    COVID-19 VIRUS SPECIMEN SOURCE Nasopharyngeal     2019-nCOV Not Detected                   VASHTI Sanchez

## 2021-06-13 NOTE — TELEPHONE ENCOUNTER
Informed the patient of the below msg's from Dr. Montanez. The patient stated that she wishes to discuss pain control before December 10. The patient was scheduled a video visit with Dr. Montanez for this morning at 11:20 to discuss.

## 2021-06-13 NOTE — TELEPHONE ENCOUNTER
If patient cannot get into either CHF clinic or Cardiology within next 3 weeks; she will schedule an appointment with me in clinic to recheck weights BP and BMP after increasing furosemide to goal of 80 mg morning, 40 mg afternoon.

## 2021-06-13 NOTE — TELEPHONE ENCOUNTER
If patient is having worsening confusion, patient would need to go to the emergency room for immediate evaluation.

## 2021-06-13 NOTE — TELEPHONE ENCOUNTER
The TCU discharge summary from November 30 clearly stated that the plan for discharge was no narcotics.  Patient should not use narcotics to treat pain.    Tylenol 3 times a day for pain.    If pain becomes severe, she will need to return to the emergency room for evaluation.    Patient does have a follow-up visit scheduled with me on December 10.    If she needs to discuss pain control before December 10, have her schedule a virtual visit with available provider.

## 2021-06-13 NOTE — PROGRESS NOTES
[facility] Admission note      Patient: Irene León  MRN: [id number]  Date of Service: 11/30/2020      [facility] [[id number]]  Reason for Visit     Chief Complaint   Patient presents with     Discharge Summary   F/U PAIN /MUSCLE SPASMS/ chf    Code Status     FULL CODE    Assessment     -T12 compression fracture  -ANATOLIY with elevated creatinine 1.4 in the TCU  - acute CHF now stable on bumex  -Osteoporosis restarted on Prolia  -Pain management no longer on narcotics; no discharge on narcotics including Percocet  -Acute metabolic encephalopathy/delirium felt to be secondary to opioid medication  -CHF with reduced ejection fraction acute exacerbation  -- ANATOLIY /cardiorenal syndrome  -History of COPD and asthma  -Acute on chronic hypoxic respiratory failure ON 3L BY NC  -Peripheral arterial disease  -History of factor V Leiden and occipital stroke on Xarelto  -History of breast cancer  -Concern for mild dementia with baseline cognitive impairment  Slums 13/30 r/c 23/30  -Generalized weakness             History     Patient is a very pleasant 75 y.o. female who is admitted to TCU.  Admitted with a T12 compression fracture  Had a very prolonged lengthy course in the TCU and was given a TLSO brace currently not wearing one.  Pain management remains her biggest challenge in the TCU.  She was on scheduled Tylenol along with Norco.  She was given muscle relaxers and eventually switched to Vistaril.  She was on Celebrex as well as gabapentin along with calcitonin nasal spray and topical Voltaren gel.  Therapy also tried various modalities for pain control.  Patient was also having piriformis syndrome on her right side.  Her pain was generally very intense and she was given orthopedic referral for requested she go in for kyphoplasty.  Her pain has now improved significantly on its own spontaneously.  She is no longer on Celebrex.  Vistaril will be discontinued at discharge and so will be  Norco because of daughter's concerns regarding addiction but she tolerated a taper of her Norco quite well.  Overall she has done quite well.  There is also concern about cognitive impairment she was on Seroquel which was discontinued in the TCU her mood and behaviors overall have been stable.  Her initial cognitive testing scores including a slums is 23/30.  She had another issue with profound weight gain and CHF exacerbation in the TCU.  Initially this was treated with a higher dose of diuretics she did not respond to that she was switched to Bumex with improvement.  She is going to be discharging on Bumex 2 mg daily      Past Medical History     Active Ambulatory (Non-Hospital) Problems    Diagnosis     Pain due to fracture     High risk medication use     Heart failure exacerbated by sotalol (H)     T12 compression fracture (H)     Acute on chronic diastolic congestive heart failure (H)     Metabolic encephalopathy     Low back pain     Compression fracture of L1 vertebra, sequela     Chronic respiratory failure with hypoxia (H)     Chronic obstructive pulmonary disease, unspecified COPD type (H)     Chronic systolic congestive heart failure (H)     Hypoxia     S/P hip replacement, left     Hip pain, left     Acute hypoxemic respiratory failure (H)     Chest pain     COPD exacerbation (H)     Herpes zoster without complication     Chest wall pain     Benign essential hypertension     Dyslipidemia     Acute respiratory failure with hypoxia (H)     Pulsatile tinnitus of both ears     Hot flashes due to tamoxifen     Pleural nodules     Hot flashes, menopausal     Malignant neoplasm of central portion of left female breast (H)     CKD (chronic kidney disease) stage 3, GFR 30-59 ml/min     Dyspnea     Insomnia     Leg pain, left     Femur fracture (H)     GERD (gastroesophageal reflux disease)     Post menopausal syndrome     OP (osteoporosis)     Hypertension     Pulmonary emphysema, unspecified emphysema type (H)      Hyperlipidemia     Centrilobular emphysema (H)     Anisocoria     OAB (overactive bladder)     Factor 5 Leiden mutation, heterozygous (H)     Family history of blood disease     Bladder incontinence     Physical deconditioning     Cardiomyopathy, idiopathic (H)     Depression with anxiety     Osteoarthritis of hip     Herniated intervertebral disc     Past Medical History:   Diagnosis Date     Arrhythmia      Breast cancer (H) 2017     CHF (congestive heart failure) (H)      COPD (chronic obstructive pulmonary disease) (H)      Coronary artery disease      GERD (gastroesophageal reflux disease)      Hx of radiation therapy 2017     Hyperlipidemia      Hypertension      Idiopathic cardiomyopathy (H)        Past Social History     Reviewed, and she  reports that she quit smoking about 13 years ago. She quit smokeless tobacco use about 16 years ago. She reports that she does not drink alcohol or use drugs.    Family History     Reviewed, and family history includes Breast cancer in her maternal aunt; Osteoporosis in an other family member; Prostate cancer in her brother and maternal uncle.    Medication List   Post Discharge Medication Reconciliation Status: discharge medications reconciled, continue medications without change   Current Outpatient Medications on File Prior to Visit   Medication Sig Dispense Refill     bumetanide (BUMEX) 2 MG tablet Take 2 mg by mouth daily.       acetaminophen (TYLENOL) 325 MG tablet Take 2 tablets (650 mg total) by mouth 3 (three) times a day. (Patient taking differently: Take 650 mg by mouth 4 (four) times a day. )  0     ADVAIR DISKUS 500-50 mcg/dose DISKUS USE 1 INHALATION TWICE A DAY 60 each 11     albuterol (PROAIR HFA;PROVENTIL HFA;VENTOLIN HFA) 90 mcg/actuation inhaler Inhale 2 puffs every 4 (four) hours as needed for wheezing. 1 Inhaler 0     ascorbic acid, vitamin C, (VITAMIN C) 1000 MG tablet Take 1,000 mg by mouth daily. Airborne 1 tab daily       aspirin 81 MG EC  tablet Take 81 mg by mouth daily.       azelastine (ASTELIN) 137 mcg (0.1 %) nasal spray 2 sprays into each nostril daily.       benzonatate (TESSALON) 200 MG capsule Take 1 capsule (200 mg total) by mouth 3 (three) times a day as needed for cough. 90 capsule 2     chlorhexidine (PERIDEX) 0.12 % solution Apply 15 mL to the mouth or throat at bedtime as needed.        cholecalciferol, vitamin D3, 1,000 unit tablet Take 1,000 Units by mouth daily.       diclofenac sodium (VOLTAREN) 1 % Gel Apply 4 g to area of pain in low back 4 times daily  0     EPINEPHrine (EPIPEN/ADRENACLICK/AUVI-Q) 0.3 mg/0.3 mL injection Inject 0.3 mg into the shoulder, thigh, or buttocks.       famotidine (PEPCID) 20 MG tablet Take 20 mg by mouth daily.       fluticasone (FLONASE) 50 mcg/actuation nasal spray Apply 1 spray into each nostril as needed.        gabapentin (NEURONTIN) 300 MG capsule Take 2 capsules (600 mg total) by mouth 2 (two) times a day. 360 capsule 2     ipratropium (ATROVENT HFA) 17 mcg/actuation inhaler Inhale 2 puffs every 6 (six) hours. 3 Inhaler 3     ipratropium-albuterol (DUO-NEB) 0.5-2.5 mg/3 mL nebulizer 1 neb 4 times a day until seen by primary and then as needed or as directed 60 vial 1     lidocaine (XYLOCAINE) 5 % ointment Apply to area of pain in low back 4 times daily 35.44 g 0     losartan (COZAAR) 50 MG tablet TAKE 1 TABLET DAILY 90 tablet 3     melatonin 3 mg Tab tablet Take 1 tablet (3 mg total) by mouth at bedtime.  0     metoprolol succinate (TOPROL-XL) 50 MG 24 hr tablet Take 1.5 tablets (75 mg total) by mouth daily. 135 tablet 3     mirabegron (MYRBETRIQ) 50 mg Tb24 ER tablet Take 1 tablet (50 mg total) by mouth daily. 90 tablet 3     omalizumab (XOLAIR) 150 mg/mL Syrg injection Inject 300 mg under the skin every 14 (fourteen) days.        pantoprazole (PROTONIX) 40 MG tablet Take 1 tablet (40 mg total) by mouth daily. 90 tablet 4     polyethylene glycol (MIRALAX) 17 gram packet Take 1 packet (17 g  total) by mouth daily as needed.  0     rivaroxaban ANTICOAGULANT (XARELTO) 20 mg tablet Take 20 mg by mouth at bedtime.       senna-docusate (PERICOLACE) 8.6-50 mg tablet Take 1 tablet by mouth daily as needed for constipation.  0     simvastatin (ZOCOR) 40 MG tablet Take 1 tablet (40 mg total) by mouth at bedtime. 90 tablet 3     sodium chloride (OCEAN) 0.65 % nasal spray 2 sprays into each nostril as needed.       solifenacin (VESICARE) 5 MG tablet Take 1 tablet (5 mg total) by mouth at bedtime. 90 tablet 3     tamoxifen (NOLVADEX) 20 MG tablet TAKE 1 TABLET DAILY 90 tablet 4     traZODone (DESYREL) 50 MG tablet TAKE 1 TABLET(50 MG) BY MOUTH AT BEDTIME AS NEEDED 90 tablet 2     triamcinolone (KENALOG) 0.1 % cream Apply to rash on arms twice daily for one week 30 g 0     triamcinolone (KENALOG) 0.5 % ointment Apply o foot twice a day 30 g 1     venlafaxine (EFFEXOR-XR) 150 MG 24 hr capsule TAKE 1 CAPSULE DAILY 90 capsule 4     [DISCONTINUED] furosemide (LASIX) 40 MG tablet Take 1 tablet (40 mg total) by mouth 2 (two) times a day at 9am and 6pm.  0     [DISCONTINUED] HYDROcodone-acetaminophen 5-325 mg per tablet Take 1 tablet by mouth every 4 (four) hours as needed. 90 tablet 0     [DISCONTINUED] QUEtiapine (SEROQUEL) 25 MG tablet Take 0.5 tablets (12.5 mg total) by mouth 3 (three) times a day as needed (Agitation that is not redirectable.).  0     Current Facility-Administered Medications on File Prior to Visit   Medication Dose Route Frequency Provider Last Rate Last Admin     denosumab 60 mg (PROLIA 60 mg/ml)  60 mg Subcutaneous Q6 Months Suzanna Shane MD   60 mg at 02/04/20 1352       Allergies     Allergies   Allergen Reactions     Sulfa (Sulfonamide Antibiotics) Rash     Headaches and dizziness.     Homeopathic Drugs Unknown and Other (See Comments)     Comment: runny nose, Comment: runny nose     Mold Extracts      Morphine (Pf)      hallucinate     Lisinopril Cough, Unknown and Itching     Comment:  cough, Comment: cough     Sulfacetamide Sodium Rash       Review of Systems   A comprehensive review of 14 systems was done. Pertinent findings noted here and in history of present illness. All the rest negative.  Constitutional: Negative.  Negative for fever, chills, she has activity change, appetite change and fatigue.   HENT: Negative for congestion and facial swelling.    Eyes: Negative for photophobia, redness and visual disturbance.   Respiratory: Negative for cough and chest tightness.    Cardiovascular: Negative for chest pain, palpitations and leg swelling.   Gastrointestinal: Negative for nausea, diarrhea, constipation, blood in stool and abdominal distention.   Genitourinary: Negative.    Musculoskeletal: Negative.  Pain in her low back; today reporting  muscle spasms which are  improving  Has piriformis pain which also feels better  Skin: Negative.    Neurological: Negative for dizziness, tremors, syncope, weakness, light-headedness and headaches.   Hematological: Does not bruise/bleed easily.   Psychiatric/Behavioral: Negative.  Significant distress due to pain is improved and feels much better      Physical Exam     Recent Vitals 10/24/2020   Height -   Weight -   BSA (m2) -   /88   Pulse 85   Temp 97.5   Temp src 1   SpO2 92   Some recent data might be hidden     Currently on 2 L of oxygen by nasal cannula wt 142lb  R/C /67  Constitutional: Oriented to person, place, and time and appears well-developed.   HEENT:  Normocephalic and atraumatic.  Eyes: Conjunctivae and EOM are normal. Pupils are equal, round, and reactive to light. No discharge.  No scleral icterus. Nose normal. Mouth/Throat: Oropharynx is clear and moist. No oropharyngeal exudate.    NECK: Normal range of motion. Neck supple. No JVD present. No tracheal deviation present. No thyromegaly present.   CARDIOVASCULAR: Normal rate, regular rhythm and intact distal pulses.  Exam reveals no gallop and no friction rub.  Systolic  murmur present.  PULMONARY: Effort normal and breath sounds normal. No respiratory distress.No Wheezing or rales.  ABDOMEN: Soft. Bowel sounds are normal. No distension and no mass.  There is no tenderness. There is no rebound and no guarding. No HSM.  MUSCULOSKELETAL: Normal range of motion. No edema and no tenderness. Mild kyphosis, no tenderness.  Mobility in the bed has improved significantly  LYMPH NODES: Has no cervical, supraclavicular, axillary and groin adenopathy.   NEUROLOGICAL: Alert and oriented to person, place, and time. No cranial nerve deficit.  Normal muscle tone. Coordination normal.   GENITOURINARY: Deferred exam.  SKIN: Skin is warm and dry. No rash noted. No erythema. No pallor.   EXTREMITIES: No cyanosis, no clubbing, no edema. No Deformity.  PSYCHIATRIC: Normal mood, affect and behavior.        Lab Results     Recent Results (from the past 240 hour(s))   COVID-19 Virus PCR MRF    Collection Time: 11/24/20  8:00 AM    Specimen: Respiratory   Result Value Ref Range    COVID-19 VIRUS SPECIMEN SOURCE Nasopharyngeal     2019-nCOV Not Detected       Results for orders placed or performed in visit on 11/18/20   Basic Metabolic Panel   Result Value Ref Range    Sodium 140 136 - 145 mmol/L    Potassium 4.0 3.5 - 5.0 mmol/L    Chloride 103 98 - 107 mmol/L    CO2 23 22 - 31 mmol/L    Anion Gap, Calculation 14 5 - 18 mmol/L    Glucose 70 70 - 125 mg/dL    Calcium 9.4 8.5 - 10.5 mg/dL    BUN 17 8 - 28 mg/dL    Creatinine 1.19 (H) 0.60 - 1.10 mg/dL    GFR MDRD Af Amer 54 (L) >60 mL/min/1.73m2    GFR MDRD Non Af Amer 44 (L) >60 mL/min/1.73m2     MEDICAL EQUIPMENT NEEDS:  Walker; home o2     DISCHARGE PLAN/FACE TO FACE:  I certify that services are/were furnished while this patient was under the care of a physician and that a physician or an allowed non-physician practitioner (NPP), had a face-to-face encounter that meets the physician face-to-face encounter requirements. The encounter was in whole, or in  part, related to the primary reason for home health. The patient is confined to his/her home and needs intermittent skilled nursing, physical therapy, speech-language pathology, or the continued need for occupational therapy. A plan of care has been established by a physician and is periodically reviewed by a physician.  Date of Face-to-Face Encounter: 11/30/20     I certify that, based on my findings, the following services are medically necessary home health services: [facility] HHA/RN and PT/OT to evaluate and treat    My clinical findings support the need for the above skilled services because: patient will be discharging to home. Patient will need assistance with medication management and performing IADLs and ADLs effectively and safely.     Patient to re-establish plan of care with their PCP within 7 days after leaving TCU.               VASHTI Sanchez

## 2021-06-13 NOTE — TELEPHONE ENCOUNTER
Left detailed voicemail for [facility]. I did try to reach out to patient but they do not accept blocked numbers. I will ask one of the girls in clinic to reach out to patient.  Dottie Grove CMA ............... 8:57 AM, 12/07/20

## 2021-06-13 NOTE — PROGRESS NOTES
"Irene León is a 75 y.o. female who is being evaluated via a billable video visit.      The patient has been notified of following:     \"This video visit will be conducted via a call between you and your physician/provider. We have found that certain health care needs can be provided without the need for an in-person physical exam.  This service lets us provide the care you need with a video conversation.  If a prescription is necessary we can send it directly to your pharmacy.  If lab work is needed we can place an order for that and you can then stop by our lab to have the test done at a later time.    Video visits are billed at different rates depending on your insurance coverage. Please reach out to your insurance provider with any questions.    If during the course of the call the physician/provider feels a video visit is not appropriate, you will not be charged for this service.\"    Patient has given verbal consent to a Video visit? Yes  How would you like to obtain your AVS? AVS Preference: [employer].  If dropped by the video visit, the video invitation should be sent to: Text to cell phone: [phone]   Will anyone else be joining your video visit? No        Video Start Time: 10:50 AM    Additional provider notes:    [facility] Follow Up Note    Irene León   75 y.o. female    Date of Visit: 12/7/2020    Chief Complaint   Patient presents with     Back Pain     Subjective  Stephanie requested a virtual video visit to discuss pain control with her T12 compression fracture.    Patient was hospitalized on October 16, 2020 with nontraumatic acute back pain.  I did review the CT scan lumbar spine October 16 that did show a 50% T12 compression fracture.  No other fractures.  Also mild to moderate central stenosis at L4-5 noted with DJD.    Patient was discharged to TCU on significant narcotics.  Patient had significant encephalopathy and confusion on narcotics at TCU.  Narcotics were " discontinued at TCU with notation in the discharge summary that she should not use narcotics and not use Vistaril any further.    Slums score at the TCU was 23/30 with suggestion of mild deficits.    She is using gabapentin twice a day and Tylenol 3 times a day.  She is not using the lidocaine ointment.    She is using the TLC O brace intermittently and was using it today.  She states it becomes more uncomfortable if she wears it too long.  She is been walking some in her residence with walker.  No new falls.    She is home with .    She has a past history of chronic narcotic use and tramadol use was discussed in August.    Discharge paperwork noted that she was discharged on intranasal calcitonin.  Unclear if she is taking that now.    She was given the first dose of Prolia in February of this year, but did not appear to have follow-up Prolia.    There was some discussion on a [facility] visit on November 11 about vertebroplasty but it does not appear that she proceeded with that.    She continues to complain of moderate to severe pain in the mid to lower back at the site of her compression fracture.  No leg radicular type pain.  No new or changing type pain.    Patient has chronic pain from a 2015 redo left hip replacement with extensive scar tissue.    Patient has significant vascular disease with history of heterozygous factor V Leiden.  History of occipital stroke.  He is on Xarelto and low-dose aspirin.    Patient did have confusion in the hospital and a head CT scan October 21 showed no acute event.  Chronic microvascular changes and old occipital stroke noted.    Blood pressure at discharge from TCU was 142/88.    Patient has a past history of congestive heart failure, systolic.  Previous ejection 30%.    I did review the heart echo from October 2020 in the hospital with ejection fraction 40%.  Moderate pulmonary hypertension and mild LVH was noted.    She does have COPD with chronic hypoxic  respiratory failure on chronic oxygen.  Quit smoking in 2007.    She was on chronic azithromycin daily 250 mg per pulmonary, but I do not see that on her active med list now.  She was getting Xolair injections every 14 days in addition to Advair and DuoNeb.  She denies significant acute change in her breathing or worsening shortness of breath now.    In the hospital she had a CHF exacerbation and was changed to Bumex 2 mg a day.  But she states she is back on her Lasix 80 mg twice daily now.  Also on losartan 50 mg a day and Toprol-XL 75 mg a day.    Past history of breast cancer in 2017 with a lumpectomy with radiation.  May 2020 mammogram negative.  She was on tamoxifen but that is not on her current med list.  No history of DVT.  She is on blood thinners as above.    History of constipation has used MiraLAX in the past.    History of urinary incontinence on Myrbetriq, urine symptoms not discussed today.    COVID-19 test - December 1    Has a daughter, Leeele    PMHx:    Past Medical History:   Diagnosis Date     Arrhythmia      Breast cancer (H) 2017     CHF (congestive heart failure) (H)      COPD (chronic obstructive pulmonary disease) (H)      Coronary artery disease      GERD (gastroesophageal reflux disease)      Hx of radiation therapy 2017     Hyperlipidemia      Hypertension      Idiopathic cardiomyopathy (H)      PSHx:    Past Surgical History:   Procedure Laterality Date     BREAST BIOPSY Right 2017     BREAST LUMPECTOMY Left 06/2017    x2     CARDIAC CATHETERIZATION       CATARACT EXTRACTION Left 07/18/2017     MS OPEN TX FEMORAL FRACTURE DISTAL MED/LAT CONDYLE Left 10/28/2015    Procedure: OPEN REDUCTION INTERNAL FIXATION LEFT  PROXIMAL FEMUR PERIPROSTHETIC FRACTURE;  Surgeon: Yovanny Albarran MD;  Location: [facility];  Service: Orthopedics     REVISION TOTAL HIP ARTHROPLASTY Left      Immunizations:   Immunization History   Administered Date(s) Administered     Influenza [id number], 01/05/2010      Influenza high dose,seasonal,PF, 65+ yrs 10/30/2014, 10/01/2015, 11/26/2016     Influenza, Seasonal, Inj PF IIV3 11/03/2003     Influenza, inj, historic,unspecified 10/01/2016, 10/01/2018     Influenza,G6W2-50,Pandemic,split,IM 01/05/2010     Influenza,quad,high Dose,PF, 65yr + 10/19/2020     Influenza,seasonal, Inj IIV3 11/03/2003, 11/08/2006, 10/24/2007, 11/28/2008, 10/09/2009, 10/22/2010, 01/26/2012, 10/03/2012, 11/01/2013     Influenza,trivalent,im, 65+yrs 09/20/2017, 09/25/2018, 10/10/2019     Pneumo Conj 13-V (2010&after) 05/11/2015     Pneumo Polysac 23-V 11/24/1997, 11/21/2007, 05/17/2017     Td, adult adsorbed, PF 07/20/2004     Tdap 02/12/2016     ZOSTER, LIVE 02/18/2009, 10/20/2014       ROS A comprehensive review of systems was performed and was otherwise negative    Medications, allergies, and problem list were reviewed and updated    Exam  LMP 11/02/1992   Patient is alert and oriented on video.  No dyspnea at rest.  Cognition appears intact and answers questions appropriately.  No delirium features.  She became upset when finding out I would not give her narcotics and ended the video visit abruptly.    Assessment/Plan  1. Compression fracture of T12 vertebra with delayed healing, subsequent encounter  Continued significant pain.  Anticipate continued pain over the next 1 to 2 months.  Patient has shown encephalopathy and confusion with narcotics in the past, also with Vistaril.  Those medicines were discontinued in TCU.  Patient has a history of long narcotic chronic use.  Warnings on tramadol use have been given in the past by me in August and I recommended she avoid future narcotics.    I have recommended today the patient not use narcotics for pain management.  Risk outweighs benefit.    Patient will continue on Tylenol 3 times daily and gabapentin twice daily.    Ambulate as able with walker at home with help of .    Continue TLSO brace.    Could follow-up as needed with the orthopedic  clinic, but not planning vertebroplasty at this time.    She denies any radicular type symptoms to suggest a new cord compression or radicular type pain.  If symptoms significantly change, reevaluate.    Patient was told that if pain becomes unbearable that she can return to the hospital for reevaluation and may need to consider long-term placement in a care center I could better care for her chronic pain with multiple medical conditions and history of confusion with narcotics.    No NSAIDs with her chronic renal insufficiency and Xarelto use.  No longer on Celebrex.    Plan to restart Prolia for osteoporosis, but I will plan to wait until the current coronavirus outbreak has lessened and until she has undergone further healing with a compression fracture.    First dose of Prolia was in February of this year.    Patient is homebound.  Home care face-to-face was done at the TCU in November 30.    2. Chronic systolic congestive heart failure (H)  Some improvement in ventricular function on echo in October but did have a CHF exacerbation in the hospital.  Possibly associated with her confusion with narcotics and her pulmonary hypertension exacerbated with narcotic use.    She currently denies CHF symptoms.  She is currently back on her Lasix twice a day.  Continue losartan 50 mg a day and Toprol-XL 75 mg a day.    I did review labs from November 18, 2020 with a creatinine improved to 1.19.  Potassium and sodium normal.    3. Chronic respiratory failure with hypoxia (H)  Continue oxygen.  Continue inhalers.  Unclear if she is still taking Xolair.    Unclear if she is still taking her daily azithromycin, recommended by pulmonary medicine in the past.    4. S/P hip replacement, left  Chronic left hip pain with scar.  Walker for ambulation.    5. Chronic renal insufficiency, stage 2 (mild)  Mild.  Creatinine improving on check last month.  Follow-up in clinic for lab work when able.    6. History of stroke  Occipital  stroke in distant past.  History of factor V Leiden heterozygous and history of vascular disease.    On low-dose aspirin and Xarelto.    Simvastatin 40 mg    7. Heterozygous factor V Leiden mutation (H)  As above    8. History of breast cancer  Mammogram negative in May.    Had been on tamoxifen, unclear if that is on hold currently.  Will need to further clarify.    9. Dysthymia  Chronic.  Effexor  mg a day.    Return in 3 days (on 12/10/2020) for Video hospital follow-up.   Patient Instructions   Continue current treatment plan.  Continue with your TLSO brace.    If pain becomes severe and you are unable to manage at home, I would direct you to the emergency room for further evaluation, and option to return to TCU.    Follow-up on December 10 as planned to discuss hospital follow-up.    Pankaj Montanez MD        Current Outpatient Medications   Medication Sig Dispense Refill     acetaminophen (TYLENOL) 325 MG tablet Take 2 tablets (650 mg total) by mouth 3 (three) times a day. (Patient taking differently: Take 650 mg by mouth 4 (four) times a day. )  0     ADVAIR DISKUS 500-50 mcg/dose DISKUS USE 1 INHALATION TWICE A DAY 60 each 11     albuterol (PROAIR HFA;PROVENTIL HFA;VENTOLIN HFA) 90 mcg/actuation inhaler Inhale 2 puffs every 4 (four) hours as needed for wheezing. 1 Inhaler 0     ascorbic acid, vitamin C, (VITAMIN C) 1000 MG tablet Take 1,000 mg by mouth daily. Airborne 1 tab daily       aspirin 81 MG EC tablet Take 81 mg by mouth daily.       azelastine (ASTELIN) 137 mcg (0.1 %) nasal spray 2 sprays into each nostril daily.       benzonatate (TESSALON) 200 MG capsule Take 1 capsule (200 mg total) by mouth 3 (three) times a day as needed for cough. 90 capsule 2     cholecalciferol, vitamin D3, 1,000 unit tablet Take 1,000 Units by mouth daily.       famotidine (PEPCID) 20 MG tablet Take 20 mg by mouth daily.       fluticasone (FLONASE) 50 mcg/actuation nasal spray Apply 1 spray into each nostril as  needed.        furosemide (LASIX) 80 MG tablet Take 80 mg by mouth 2 (two) times a day at 9am and 6pm.        gabapentin (NEURONTIN) 300 MG capsule Take 2 capsules (600 mg total) by mouth 2 (two) times a day. 360 capsule 2     ipratropium (ATROVENT HFA) 17 mcg/actuation inhaler Inhale 2 puffs every 6 (six) hours. 3 Inhaler 3     ipratropium-albuterol (DUO-NEB) 0.5-2.5 mg/3 mL nebulizer 1 neb 4 times a day until seen by primary and then as needed or as directed 60 vial 1     losartan (COZAAR) 50 MG tablet TAKE 1 TABLET DAILY 90 tablet 3     melatonin 3 mg Tab tablet Take 1 tablet (3 mg total) by mouth at bedtime.  0     metoprolol succinate (TOPROL-XL) 50 MG 24 hr tablet Take 1.5 tablets (75 mg total) by mouth daily. 135 tablet 3     mirabegron (MYRBETRIQ) 50 mg Tb24 ER tablet Take 1 tablet (50 mg total) by mouth daily. 90 tablet 3     omalizumab (XOLAIR) 150 mg/mL Syrg injection Inject 300 mg under the skin every 14 (fourteen) days.        pantoprazole (PROTONIX) 40 MG tablet Take 1 tablet (40 mg total) by mouth daily. 90 tablet 4     polyethylene glycol (MIRALAX) 17 gram packet Take 1 packet (17 g total) by mouth daily as needed.  0     rivaroxaban ANTICOAGULANT (XARELTO) 20 mg tablet Take 20 mg by mouth at bedtime.       senna-docusate (PERICOLACE) 8.6-50 mg tablet Take 1 tablet by mouth daily as needed for constipation.  0     simvastatin (ZOCOR) 40 MG tablet Take 1 tablet (40 mg total) by mouth at bedtime. 90 tablet 3     sodium chloride (OCEAN) 0.65 % nasal spray 2 sprays into each nostril as needed.       solifenacin (VESICARE) 5 MG tablet Take 1 tablet (5 mg total) by mouth at bedtime. 90 tablet 3     tamoxifen (NOLVADEX) 20 MG tablet TAKE 1 TABLET DAILY 90 tablet 4     traZODone (DESYREL) 50 MG tablet TAKE 1 TABLET(50 MG) BY MOUTH AT BEDTIME AS NEEDED 90 tablet 2     venlafaxine (EFFEXOR-XR) 150 MG 24 hr capsule TAKE 1 CAPSULE DAILY 90 capsule 4     chlorhexidine (PERIDEX) 0.12 % solution Apply 15 mL to the  mouth or throat at bedtime as needed.        diclofenac sodium (VOLTAREN) 1 % Gel Apply 4 g to area of pain in low back 4 times daily (Patient not taking: Reported on 2020)  0     EPINEPHrine (EPIPEN/ADRENACLICK/AUVI-Q) 0.3 mg/0.3 mL injection Inject 0.3 mg into the shoulder, thigh, or buttocks.       lidocaine (XYLOCAINE) 5 % ointment Apply to area of pain in low back 4 times daily (Patient not taking: Reported on 2020) 35.44 g 0     Current Facility-Administered Medications   Medication Dose Route Frequency Provider Last Rate Last Admin     denosumab 60 mg (PROLIA 60 mg/ml)  60 mg Subcutaneous Q6 Months Suzanna Shane MD   60 mg at 20 1352     Allergies   Allergen Reactions     Sulfa (Sulfonamide Antibiotics) Rash     Headaches and dizziness.     Homeopathic Drugs Unknown and Other (See Comments)     Comment: runny nose, Comment: runny nose     Mold Extracts      Morphine (Pf)      hallucinate     Lisinopril Cough, Unknown and Itching     Comment: cough, Comment: cough     Sulfacetamide Sodium Rash     Social History     Tobacco Use     Smoking status: Former Smoker     Quit date: 10/28/2007     Years since quittin.1     Smokeless tobacco: Former User     Quit date: 2004   Substance Use Topics     Alcohol use: No     Drug use: No             Video-Visit Details    Type of service:  Video Visit    Video End Time (time video stopped): 11 AM  Originating Location (pt. Location): Home    Distant Location (provider location):  [facility]     Platform used for Video Visit: Mary Beth Montanez MD

## 2021-06-13 NOTE — PROGRESS NOTES
[facility] Patient Outreach:     Discharge Date::  12/1/20      Reason for TCU stay (discharge diagnosis)::  T12 fx      Are you feeling better, the same or worse since your discharge?:  Patient is feeling better          As part of your discharge plan, did they discuss home care with you?: Yes        Have your seen them yet, or are they scheduled to visit?: No        Did you receive any new medications, or was there a change to your medications?: No (same meds as TCU.  )            Do you have any follow up visits scheduled with your PCP or Specialist?:  Yes, with PCP      (RN) Is it scheduled soon enough (3-5 days)?: No        (RN) Is the patient okay with moving appointment up (if RN feels appropriate)?: No (Patient will be seeing PCP on 12/10/20.  )

## 2021-06-13 NOTE — TELEPHONE ENCOUNTER
We received a home care referral for this patient. They are open to [facility].

## 2021-06-13 NOTE — PATIENT INSTRUCTIONS - HE
We will give you your Prolia shot today.    Flu shot today.    Weight looks stable.  You need to make sure that you are not consuming too much salt/sodium.  No more potato chips.    Take your furosemide (Lasix) twice daily as prescribed.    Make sure to call Dr. Chilel and schedule a follow-up appointment.  You were due to see her this past November but missed that appointment.    I will place another order for home health nursing today.    We need to recheck your kidney labs and electrolytes.    Follow-up with the spine doctor at [facility] as planned for your compression fracture.    You had been taking azithromycin in the past for your COPD.  It does not sound like you are taking this anymore.    Follow-up with Dr. Darling in 2 months for routine checkup.

## 2021-06-13 NOTE — TELEPHONE ENCOUNTER
Reason for Call: Request for an order or referral:    Order or referral being requested: Updated Home Health orders per patient request    Date needed: by next week    Has the patient been seen by the PCP for this problem? YES    Additional comments: SN, PT and OT  Verbal order requested  [phone]    Phone number Patient can be reached at:  Other phone number:  [phone]  [facility]    Best Time:  any    Can we leave a detailed message on this number?  Yes    Call taken on 12/18/2020 at 3:10 PM by Laura L Goldberg

## 2021-06-13 NOTE — TELEPHONE ENCOUNTER
Orders being requested:   Skilled nursing visits 1 time a week for 8 weeks    Reason service is needed/diagnosis: Pain management  Respiratory and Cardiac assessments  T-Spine compression fracture.  When are orders needed by: ASAP  Where to send Orders: Phone:  [phone]  Okay to leave detailed message?  Yes

## 2021-06-13 NOTE — PROGRESS NOTES
[facility] Admission note      Patient: Irene León  MRN: [id number]  Date of Service: 11/23/2020      [facility] [[id number]]  Reason for Visit     Chief Complaint   Patient presents with     Review Of Multiple Medical Conditions   F/U PAIN /MUSCLE SPASMS/ chf    Code Status     FULL CODE    Assessment     -Acute CHF exacerbation with persistent weight gain in spite of being on a higher dose of Lasix; now improved  -Ongoing concerns about cough and congestion with hypoxic respiratory failure  - pain management  -T12 compression fracture  -ANATOLIY with elevated creatinine 1.4 in the TCU  -Osteoporosis restarted on Prolia  -Pain management no longer on narcotics; no discharge on narcotics including Percocet  -Acute metabolic encephalopathy/delirium felt to be secondary to opioid medication  -CHF with reduced ejection fraction acute exacerbation  -- ANATOLIY /cardiorenal syndrome  -History of COPD and asthma  -Acute on chronic hypoxic respiratory failure ON 3L BY NC  -Peripheral arterial disease  -History of factor V Leiden and occipital stroke on Xarelto  -History of breast cancer  -Concern for mild dementia with baseline cognitive impairment  Slums 13/30  -Generalized weakness      Plan     Patient has been admitted to the TCU for strengthening and rehab  Patient continues to have issues with pain management with intractable low back and hip pain.  She has done a follow-up with orthopedics and has been given a referral for kyphoplasty.  Now reporting an improvement in pain and not sure if she wants the procedure  Discontinue norco q 4hrs prn  Start norco 5/325 q 8hrs prn  Discontinue celebrex after another 10d  Discontinue vistaril after another 7d  Monitor  Response to change in medication  Cont bumex 2mg daily - wt stable at 142lb  Cont o2 - baseline ; no weaning  Discharge plan discussed- wants to go home this weekend  Discuss with SW         History     Patient is a very pleasant 75  y.o. female who is admitted to TCU.  Admitted with a T12 compression fracture  She has been discharged weightbearing as tolerated with a TLSO brace to be worn all day  She did have a follow-up with orthopedics.  Based on the recommendation she was given an appointment to follow-up with orthopedics for kyphoplasty and injection in her piriformis.  Her daughter is trying to schedule it.  Her brace has been adjusted she does admit that she is feeling much better and is not sure if she wants to keep those appointments for kyphoplasty.  She feels that if she is not having any pain she would not like to go through any unnecessary procedures  Pain management reviewed   She does report that her pain has improved significantly.  Narcotic intake was reviewed with nursing were reporting that she is taking maybe 1 or 2 narcotic pills in the morning primarily.  They overall feel that her pain is also improved significantly she is on multiple medications for pain management  She also had recent exacerbation of CHF for which she was given high doses of Bumex.  BMPs have been stable she is reporting her cough and congestion have improved.  Now down to 142 pounds which is almost close to 8 pound weight loss  She is back to her baseline oxygen needs which is 2 to 3 L.  Cognitively she has some impairment but overall has been stable she appears to be mildly forgetful.  Physically remains quite debilitated and wheelchair-bound  She now feels that she is that she is ready to discharge home      Past Medical History     Active Ambulatory (Non-Hospital) Problems    Diagnosis     Pain due to fracture     High risk medication use     Heart failure exacerbated by sotalol (H)     T12 compression fracture (H)     Acute on chronic diastolic congestive heart failure (H)     Metabolic encephalopathy     Low back pain     Compression fracture of L1 vertebra, sequela     Chronic respiratory failure with hypoxia (H)     Chronic obstructive pulmonary  disease, unspecified COPD type (H)     Chronic systolic congestive heart failure (H)     Hypoxia     S/P hip replacement, left     Hip pain, left     Acute hypoxemic respiratory failure (H)     Chest pain     COPD exacerbation (H)     Herpes zoster without complication     Chest wall pain     Benign essential hypertension     Dyslipidemia     Acute respiratory failure with hypoxia (H)     Pulsatile tinnitus of both ears     Hot flashes due to tamoxifen     Pleural nodules     Hot flashes, menopausal     Malignant neoplasm of central portion of left female breast (H)     CKD (chronic kidney disease) stage 3, GFR 30-59 ml/min     Dyspnea     Insomnia     Leg pain, left     Femur fracture (H)     GERD (gastroesophageal reflux disease)     Post menopausal syndrome     OP (osteoporosis)     Hypertension     Pulmonary emphysema, unspecified emphysema type (H)     Hyperlipidemia     Centrilobular emphysema (H)     Anisocoria     OAB (overactive bladder)     Factor 5 Leiden mutation, heterozygous (H)     Family history of blood disease     Bladder incontinence     Physical deconditioning     Cardiomyopathy, idiopathic (H)     Depression with anxiety     Osteoarthritis of hip     Herniated intervertebral disc     Past Medical History:   Diagnosis Date     Arrhythmia      Breast cancer (H) 2017     CHF (congestive heart failure) (H)      COPD (chronic obstructive pulmonary disease) (H)      Coronary artery disease      GERD (gastroesophageal reflux disease)      Hx of radiation therapy 2017     Hyperlipidemia      Hypertension      Idiopathic cardiomyopathy (H)        Past Social History     Reviewed, and she  reports that she quit smoking about 13 years ago. She quit smokeless tobacco use about 16 years ago. She reports that she does not drink alcohol or use drugs.    Family History     Reviewed, and family history includes Breast cancer in her maternal aunt; Osteoporosis in an other family member; Prostate cancer in her  brother and maternal uncle.    Medication List   Post Discharge Medication Reconciliation Status: discharge medications reconciled, continue medications without change   Current Outpatient Medications on File Prior to Visit   Medication Sig Dispense Refill     acetaminophen (TYLENOL) 325 MG tablet Take 2 tablets (650 mg total) by mouth 3 (three) times a day. (Patient taking differently: Take 650 mg by mouth 4 (four) times a day. )  0     ADVAIR DISKUS 500-50 mcg/dose DISKUS USE 1 INHALATION TWICE A DAY 60 each 11     albuterol (PROAIR HFA;PROVENTIL HFA;VENTOLIN HFA) 90 mcg/actuation inhaler Inhale 2 puffs every 4 (four) hours as needed for wheezing. 1 Inhaler 0     ascorbic acid, vitamin C, (VITAMIN C) 1000 MG tablet Take 1,000 mg by mouth daily. Airborne 1 tab daily       aspirin 81 MG EC tablet Take 81 mg by mouth daily.       azelastine (ASTELIN) 137 mcg (0.1 %) nasal spray 2 sprays into each nostril daily.       benzonatate (TESSALON) 200 MG capsule Take 1 capsule (200 mg total) by mouth 3 (three) times a day as needed for cough. 90 capsule 2     celecoxib (CELEBREX) 200 MG capsule Take 200 mg by mouth 2 (two) times a day.       chlorhexidine (PERIDEX) 0.12 % solution Apply 15 mL to the mouth or throat at bedtime as needed.        cholecalciferol, vitamin D3, 1,000 unit tablet Take 1,000 Units by mouth daily.       diclofenac sodium (VOLTAREN) 1 % Gel Apply 4 g to area of pain in low back 4 times daily  0     EPINEPHrine (EPIPEN/ADRENACLICK/AUVI-Q) 0.3 mg/0.3 mL injection Inject 0.3 mg into the shoulder, thigh, or buttocks.       famotidine (PEPCID) 20 MG tablet Take 20 mg by mouth daily.       fluticasone (FLONASE) 50 mcg/actuation nasal spray Apply 1 spray into each nostril as needed.        furosemide (LASIX) 40 MG tablet Take 1 tablet (40 mg total) by mouth 2 (two) times a day at 9am and 6pm.  0     gabapentin (NEURONTIN) 300 MG capsule Take 2 capsules (600 mg total) by mouth 2 (two) times a day. 360 capsule  2     HYDROcodone-acetaminophen 5-325 mg per tablet Take 1 tablet by mouth every 4 (four) hours as needed. 90 tablet 0     ipratropium (ATROVENT HFA) 17 mcg/actuation inhaler Inhale 2 puffs every 6 (six) hours. 3 Inhaler 3     ipratropium-albuterol (DUO-NEB) 0.5-2.5 mg/3 mL nebulizer 1 neb 4 times a day until seen by primary and then as needed or as directed 60 vial 1     lidocaine (XYLOCAINE) 5 % ointment Apply to area of pain in low back 4 times daily 35.44 g 0     losartan (COZAAR) 50 MG tablet TAKE 1 TABLET DAILY 90 tablet 3     melatonin 3 mg Tab tablet Take 1 tablet (3 mg total) by mouth at bedtime.  0     metoprolol succinate (TOPROL-XL) 50 MG 24 hr tablet Take 1.5 tablets (75 mg total) by mouth daily. 135 tablet 3     mirabegron (MYRBETRIQ) 50 mg Tb24 ER tablet Take 1 tablet (50 mg total) by mouth daily. 90 tablet 3     omalizumab (XOLAIR) 150 mg/mL Syrg injection Inject 300 mg under the skin every 14 (fourteen) days.        pantoprazole (PROTONIX) 40 MG tablet Take 1 tablet (40 mg total) by mouth daily. 90 tablet 4     polyethylene glycol (MIRALAX) 17 gram packet Take 1 packet (17 g total) by mouth daily as needed.  0     QUEtiapine (SEROQUEL) 25 MG tablet Take 0.5 tablets (12.5 mg total) by mouth 3 (three) times a day as needed (Agitation that is not redirectable.).  0     rivaroxaban ANTICOAGULANT (XARELTO) 20 mg tablet Take 20 mg by mouth at bedtime.       senna-docusate (PERICOLACE) 8.6-50 mg tablet Take 1 tablet by mouth daily as needed for constipation.  0     simvastatin (ZOCOR) 40 MG tablet Take 1 tablet (40 mg total) by mouth at bedtime. 90 tablet 3     sodium chloride (OCEAN) 0.65 % nasal spray 2 sprays into each nostril as needed.       solifenacin (VESICARE) 5 MG tablet Take 1 tablet (5 mg total) by mouth at bedtime. 90 tablet 3     tamoxifen (NOLVADEX) 20 MG tablet TAKE 1 TABLET DAILY 90 tablet 4     traZODone (DESYREL) 50 MG tablet TAKE 1 TABLET(50 MG) BY MOUTH AT BEDTIME AS NEEDED 90 tablet 2      triamcinolone (KENALOG) 0.1 % cream Apply to rash on arms twice daily for one week 30 g 0     triamcinolone (KENALOG) 0.5 % ointment Apply o foot twice a day 30 g 1     venlafaxine (EFFEXOR-XR) 150 MG 24 hr capsule TAKE 1 CAPSULE DAILY 90 capsule 4     Current Facility-Administered Medications on File Prior to Visit   Medication Dose Route Frequency Provider Last Rate Last Admin     denosumab 60 mg (PROLIA 60 mg/ml)  60 mg Subcutaneous Q6 Months Suzanna Shane MD   60 mg at 02/04/20 1352       Allergies     Allergies   Allergen Reactions     Sulfa (Sulfonamide Antibiotics) Rash     Headaches and dizziness.     Homeopathic Drugs Unknown and Other (See Comments)     Comment: runny nose, Comment: runny nose     Mold Extracts      Morphine (Pf)      hallucinate     Lisinopril Cough, Unknown and Itching     Comment: cough, Comment: cough     Sulfacetamide Sodium Rash       Review of Systems   A comprehensive review of 14 systems was done. Pertinent findings noted here and in history of present illness. All the rest negative.  Constitutional: Negative.  Negative for fever, chills, she has activity change, appetite change and fatigue.   HENT: Negative for congestion and facial swelling.    Eyes: Negative for photophobia, redness and visual disturbance.   Respiratory: Negative for cough and chest tightness.    Cardiovascular: Negative for chest pain, palpitations and leg swelling.   Gastrointestinal: Negative for nausea, diarrhea, constipation, blood in stool and abdominal distention.   Genitourinary: Negative.    Musculoskeletal: Negative.  Pain in her low back; today reporting intractable muscle spasms which are  improving  Has piriformis pain which also feels better  Skin: Negative.    Neurological: Negative for dizziness, tremors, syncope, weakness, light-headedness and headaches.   Hematological: Does not bruise/bleed easily.   Psychiatric/Behavioral: Negative.  Significant distress due to pain is improved and  feels much better      Physical Exam     Recent Vitals 10/24/2020   Height -   Weight -   BSA (m2) -   /88   Pulse 85   Temp 97.5   Temp src 1   SpO2 92   Some recent data might be hidden     Currently on 2 L of oxygen by nasal cannula wt 142lb  R/C /67  Constitutional: Oriented to person, place, and time and appears well-developed.   HEENT:  Normocephalic and atraumatic.  Eyes: Conjunctivae and EOM are normal. Pupils are equal, round, and reactive to light. No discharge.  No scleral icterus. Nose normal. Mouth/Throat: Oropharynx is clear and moist. No oropharyngeal exudate.    NECK: Normal range of motion. Neck supple. No JVD present. No tracheal deviation present. No thyromegaly present.   CARDIOVASCULAR: Normal rate, regular rhythm and intact distal pulses.  Exam reveals no gallop and no friction rub.  Systolic murmur present.  PULMONARY: Effort normal and breath sounds normal. No respiratory distress.No Wheezing or rales.  ABDOMEN: Soft. Bowel sounds are normal. No distension and no mass.  There is no tenderness. There is no rebound and no guarding. No HSM.  MUSCULOSKELETAL: Normal range of motion. No edema and no tenderness. Mild kyphosis,  tenderness  to palpation on her lower thoracic spine  Mobility is limited.  LYMPH NODES: Has no cervical, supraclavicular, axillary and groin adenopathy.   NEUROLOGICAL: Alert and oriented to person, place, and time. No cranial nerve deficit.  Normal muscle tone. Coordination normal.   GENITOURINARY: Deferred exam.  SKIN: Skin is warm and dry. No rash noted. No erythema. No pallor.   EXTREMITIES: No cyanosis, no clubbing, no edema. No Deformity.  PSYCHIATRIC: Normal mood, affect and behavior.        Lab Results     Recent Results (from the past 240 hour(s))   COVID-19 Virus PCR MRF    Collection Time: 11/17/20  7:00 AM    Specimen: Respiratory   Result Value Ref Range    COVID-19 VIRUS SPECIMEN SOURCE Nasopharyngeal     2019-nCOV Not Detected    Basic Metabolic  Panel    Collection Time: 11/18/20 12:10 PM   Result Value Ref Range    Sodium 140 136 - 145 mmol/L    Potassium 4.0 3.5 - 5.0 mmol/L    Chloride 103 98 - 107 mmol/L    CO2 23 22 - 31 mmol/L    Anion Gap, Calculation 14 5 - 18 mmol/L    Glucose 70 70 - 125 mg/dL    Calcium 9.4 8.5 - 10.5 mg/dL    BUN 17 8 - 28 mg/dL    Creatinine 1.19 (H) 0.60 - 1.10 mg/dL    GFR MDRD Af Amer 54 (L) >60 mL/min/1.73m2    GFR MDRD Non Af Amer 44 (L) >60 mL/min/1.73m2                  VASHTI Sanchez

## 2021-06-13 NOTE — TELEPHONE ENCOUNTER
Medication Request  Medication name: Ultram 100 mg   Requested Pharmacy: [facility] # [id number]  Reason for request: Patient was taking in the hospital for her back fracture.  The patient was sent home with no pain medication, describing to the home care nurse  Pain 8/10  When did you use medication last?:  In the hospital  Patient offered appointment: No  Okay to leave a detailed message: yes           AND    Medication Request  Medication name: Oxycodone, patient believes was the standard dose of 5/325 mg  Requested Pharmacy: [facility] # [id number]  Reason for request: Patient was taking in the hospital for her back fracture.  The patient was sent home with no pain medication, describing to the home care nurse  Pain 8/10  When did you use medication last?:  In the hospital  Patient offered appointment: No  Okay to leave a detailed message: yes

## 2021-06-13 NOTE — TELEPHONE ENCOUNTER
Reason for Call: Request for an order or referral:    Order or referral being requested: Verbal order for Social Work    Date needed: as soon as possible    Has the patient been seen by the PCP for this problem? YES and Not Applicable    Additional comments: HomeCare already involved with Pt and Skilled Nursing    Phone number Patient can be reached at:  Other phone number:  [phone]  Karuna @ [facility]    Best Time:  any    Can we leave a detailed message on this number?  Yes    Call taken on 12/22/2020 at 8:38 AM by Laura L Goldberg

## 2021-06-14 NOTE — TELEPHONE ENCOUNTER
New Rx Request    Request for medication: Xarelto 20 mg   Last visit addressing this medication: 1/21/21  Follow up plan 2  months  Last refill on 10/17/20, quantity #90   CSA completed Not applicable   checked Not applicable    Appointment: Appointment scheduled for 3/9/21     Alia Quiroz, [facility]

## 2021-06-14 NOTE — TELEPHONE ENCOUNTER
Reason for Call: Verbal Order to discontinue OT    Order or referral being requested: Last visit with patient will be on 1/11/2021, requesting verbal order to discharge her from O/T after that visit.     Date needed: as soon as possible    Has the patient been seen by the PCP for this problem? YES and NO    Additional comments: NO    Phone number to be reached at: [phone]    Best Time:  anytime    Can we leave a detailed message on this number?  Yes    Call taken on 1/4/2021 at 1:07 PM by Shannon Shipman

## 2021-06-14 NOTE — TELEPHONE ENCOUNTER
Wilmar from [facility] was informed of the clinician message.

## 2021-06-14 NOTE — TELEPHONE ENCOUNTER
Physical therapist  called in form [facility] states he want request.  1 time week for 5 weeks for PT.      Please advise.      Stephane Kim RN, [facility] Triage/Med Refill 12/24/2020 4:20 PM

## 2021-06-14 NOTE — PATIENT INSTRUCTIONS - HE
You have an appointment with Dr. Darling tomorrow at 3:20 PM.  Please arrive 10 to 15 minutes early.    You need to have a discussion with Dr. Darling about the risks versus benefits of surgery.    We also need to have a discussion on your underlying chronic medical conditions, and medications leading up to surgery.

## 2021-06-14 NOTE — TELEPHONE ENCOUNTER
Reason for Call: Request for an order or referral: DISCHARGE ORDERS     Order or referral being requested: Verbal discharge orders per patient request. Patient is independent with home exercise program.     Date needed: as soon as possible    Has the patient been seen by the PCP for this problem? n/a    Additional comments: n/a    Phone number Patient can be reached at:  Other phone number:  [phone]     Best Time:  anytime    Can we leave a detailed message on this number?  Yes    Call taken on 1/13/2021 at 3:48 PM by Silvia Lloyd

## 2021-06-14 NOTE — PATIENT INSTRUCTIONS - HE
Do not take your Xarelto for 2 days prior to the procedure or the day of procedure.  Restart Xarelto at the usual dose the day after procedure.    Aspirin has been taken off your medication list.    Take your losartan and pantoprazole with a sip of water on the morning of the procedure.  Do not take your furosemide on the morning of the procedure.    Take your inhalers as usual.  Bring your oxygen with you to the procedure.    You have been cleared just for local anesthesia.  Sedating medications may cause low oxygen levels for you.    Okay to change your February 19 appointment to 2 months from now, after you have had the COVID-19 vaccine.

## 2021-06-14 NOTE — PATIENT INSTRUCTIONS - HE
Irene León,    It was a pleasure to see you today at [facility].     My recommendations after this visit include:  - Please follow up with Dr Chilel in June   - Please follow up with Samantha Herrera in 6-8 weeks  - I have checked labs and will contact you with results  - continue current medications    Samantha Herrera, CNP

## 2021-06-14 NOTE — PROGRESS NOTES
[facility] On[facility] Progress Note    Patient: Irene León  MRN: [id number]  Date of Service:         Reason for Visit    Chief Complaint   Patient presents with     HE Cancer     Breast Cancer       Assessment and Plan  Malignant neoplasm of central portion of left female breast    Staging form: Breast, AJCC 7th Edition    - Pathologic stage from 7/26/2017: Stage IA (T1b, N0, cM0) - Signed by Devon Suarez MD on 8/1/2017    ECOG Performance   ECOG Performance Status: 2     Distress Assessment  Distress Assessment Score: 3    Pain  Currently in Pain: Yes  Pain Score (Initial OR Reassessment): 5 (with movement. no pain at rest.)  Location: Left hip    # Stage IA (pT1b, pN0 (sn),cM0) invasive ductal carcinoma of the left breast, grade 1, associated DCIS, ER/OR positive, HER-2 negative, right breast atypical ductal hyperplasia.  S/P right breast lumpectomy, left breast lumpectomy and left axillary sentinel lymph node biopsy on 6/20/2017.  She has several comorbidities including nonischemic cardiomyopathy (EF 40% in 3/17), osteoporosis on treatment with oral bisphosphonate, factor V Leiden mutation (details unknown).     We had a long discussion regarding the benefit of adjuvant endocrine therapy.  I again reminded her Oncotype results with the risks of breast cancer recurrence is estimated to be about 10% with the potential decreasing the risk of recurrence to 5% with 5 year of tamoxifen.  She was questioning how strong the previous studies and data are to recommend adjuvant endocrine therapy.  I also reviewed the meta-analysis of 20 year follow-up after 5 years of tamoxifen therapy with continuous about 13% in stage I disease.  She finally interested in starting on tamoxifen.  She is also worried about for 5 years duration of therapy.  I explained to her that we need to see how she is responding or reacting to tamoxifen and we can determine the duration going along.         Plan:  -She will start  tamoxifen 20 mg once daily.    -Continue aspirin 81 mg once daily.    -Follow-up with me in about 4 weeks.    Problem List    1. Malignant neoplasm of central portion of left breast in female, estrogen receptor positive        ______________________________________________________________________________    Diagnosis  6/20/17  Stage IA (pT1b, pN0(sn), cM0) invasive ductal carcinoma of the left breast  - ER (high positive, 98%), MS (high positive, 97%) HER2 (negative, score of 1+ by IHC)  - McKinney grade 1  - Tumor size: 2.3 cm  - Margin-negative on final margin with reexcision for invasive carcinoma but close margin for DCIS of 0.2 cm from new medial margin.    - Angiolymphatic invasion present at the inferior and original medial margin.  - 1 sentinel lymph node removed, negative for carcinoma  - associated DCIS  - Right breast atypical ductal hyperplasia.    - Oncotype DX recurrence score 6 : 10 year risk of recurrence with 5 year of tamoxifen is 5%.    Therapy to date:  6/20/17 -right breast lumpectomy AND left breast lumpectomy, left axillary sentinel lymph node biopsy  6/30/17-reexcision lumpectomy of the left breast  9/8/17-completed adjuvant radiation to the left breast 4256 cGy/16  12/6/17-initiated adjuvant endocrine therapy with tamoxifen.    History of Present Illness    Stephanie is here by herself today.    She underwent left hip revision and bone graft couple months ago.  She was in TCU for 7 weeks and she was recently discharged back home.  She has lost about 9 pounds.  She has poor appetite.  She is continue using a cane and doing therapy.   She is having a lot of hot flashes, drenching night sweats and very worried about starting on endocrine therapy with potential risk of worsening side effects.  She denies any unusual vaginal bleeding.  No leg pain or swelling.  No prior history of DVT or PE.    Pain Status  Currently in Pain: Yes    Review of Systems    Constitutional  Constitutional (WDL):  "Exceptions to WDL  Fatigue: None  Fever: None  Chills: None  Weight Gain: None  Weight Loss: to <10% from baseline, intervention not indicated (9# since 9/8/17)  Neurosensory  Neurosensory (WDL): All neurosensory elements are within defined limits  Eye   Eye Disorder (WDL): All eye disorder elements are within defined limits  Ear  Ear Disorder (WDL): All ear disorder elements are within defined limits  Cardiovascular  Cardiovascular (WDL): Exceptions to WDL  Palpitations: Definition: A disorder characterized by inflammation of the muscle tissue of the heart. (\"can sometimes feel it pounding in head\")  Edema: No  Pulmonary  Respiratory (WDL): Exceptions to WDL  Cough: None  Dyspnea: Shortness of breath with moderate exertion  Hypoxia: None  Gastrointestinal  Gastrointestinal (WDL): Exceptions to WDL  Anorexia: Oral intake altered without significant weight loss or malnutrition, oral nutritional supplements indicated  Constipation: None  Diarrhea: None  Dysphagia: None  Esophagitis: None  Nausea: None  Pharyngitis: None  Vomiting: None  Dysgeusia: None  Dry Mouth: Symptomatic (e.g., dry or thick saliva) without significant dietary alteration, unstimulated saliva flow >0.2 ml/min  Genitourinary  Genitourinary (WDL): All genitourinary elements are within defined limits  Lymphatic  Lymph (WDL): All lymph disorder elements are within defined limits  Musculoskeletal and Connective Tissue  Musculoskeletal and Connetive Tissue Disorders (WDL): Exceptions to WDL  Arthralgia: Mild pain (Left hip)  Bone Pain: None  Muscle Weakness : None  Myalgia: Mild pain (Left hip)  Integumentary  Integumentary (WDL): All integumentary elements are within defined limits  Patient Coping  Patient Coping: Accepting  Distress Assessment  Distress Assessment Score: 3  Accompanied by  Accompanied by: Alone  Oral Chemo Adherence       Past History  Past Medical History:   Diagnosis Date     Arrhythmia      Breast cancer      CHF (congestive heart " "failure)      COPD (chronic obstructive pulmonary disease)      Coronary artery disease      GERD (gastroesophageal reflux disease)      Hyperlipidemia      Hypertension      Idiopathic cardiomyopathy        Physical Exam    Recent Vitals 12/6/2017   Height 5' 0\"   Weight 143 lbs 6 oz   BSA (m2) 1.66 m2   /57   Pulse 72   Temp -   Temp src -   SpO2 92   Some recent data might be hidden     General: alert, awake, not in acute distress  HEENT: Head: Normal, normocephalic, atraumatic.  Eye: Normal external eye, conjunctiva, lids cornea, CATINA.  Ears: Non-tender.  Nose: Normal external nose, mucus membranes and septum.  Pharynx: Dental Hygiene adequate. Normal buccal mucosa. Normal pharynx.  Neck / Thyroid: Supple, no masses, nodes, nodules or enlargement.  Lymphatics: No abnormally enlarged lymph nodes.  Chest: Normal chest wall and respirations. Clear to auscultation.  Breasts:left breast skin appears irritated.  Heart: S1 S2 RRR, no murmur.   Abdomen: abdomen is soft without significant tenderness, masses, organomegaly or guarding  Extremities: normal strength, tone, and muscle mass  Skin: normal. no rash or abnormalities  CNS: non focal.      Lab Results    No results found for this or any previous visit (from the past 168 hour(s)).    Imaging    No results found.      Signed by: Devon Suarez MD  "

## 2021-06-14 NOTE — PROGRESS NOTES
[facility]  [address] [place] [id number]  Dept: [phone]  Dept Fax: [phone]  Primary Provider: Omar Gauthier MD  Pre-op Performing Provider: OMAR GAUTHIER    PREOPERATIVE EVALUATION:  Today's date: 1/21/2021    Irene León is a 75 y.o. female who presents for a preoperative evaluation.    Surgical Information:  Surgery/Procedure: Spine Injection  Surgery Location: [facility]   Surgeon: Dr. Philip of [facility]  Surgery Date: 1/25/21  Time of Surgery: 0700  Where patient plans to recover: At home with family  Fax number for surgical facility: [phone]    Type of Anesthesia Anticipated: Local        Subjective     HPI related to upcoming procedure: Patient is a T12 compression fracture, at least 50%, hospitalized October 2020.  He has had continued back pain.  She spent some time in TCU.  She had encephalopathy from narcotics in the hospital and TCU.  Is been recommended that patient not use narcotics.  She has gotten some from orthopedic clinic and apparently is just taking 1/2 tablet daily.  I have told patient I would not prescribe narcotics for her.  She is also take gabapentin and Tylenol.    For ambulation with chronic left hip pain for redo left hip replacement in the past with scar tissue.  He is ambulating some with walker but has affected her mobility with the back pain.    No leg radicular pain.    Imaging last October in the hospital did show some mild to moderate central canal stenosis with DJD at L4-5.    She denies worsening shortness of breath or edema currently.  She has systolic congestive heart failure, moderate pulmonary hypertension with chronic respiratory hypoxic failure.  Quit smoking in 2007 with history of COPD.  She does have oxygen but does not always wear it.  Uses nebulizers and inhalers.    She did receive breast radiation in 2017 for breast cancer.    History of stroke with heterozygous factor V Leiden  mutation on Xarelto.    On simvastatin 20 mg.    She is not had chest pain.  She does not have good exertional ability, walks some in her residence with walker.    Blood pressure earlier this month and cardiology was 128/62.  Blood pressure earlier this week was 116/62.  She denies orthostasis.    No fever or acute change in cough.  No new abdominal pain or blood in stool or melena.    Preop Questions 1/19/2021   Have you ever had a heart attack or stroke? YES -history occipital stroke   Have you ever had surgery on your heart or blood vessels, such as a stent placement, a coronary artery bypass, or surgery on an artery in your head, neck, heart, or legs? YES -    Do you have chest pain with activity? No   Do you have a history of  heart failure? No   Do you currently have a cold, bronchitis or symptoms of other infection? No   Do you have a cough, shortness of breath, or wheezing? YES -chronic   Do you or anyone in your family have previous history of blood clots? YES -    Do you or does anyone in your family have a serious bleeding problem such as prolonged bleeding following surgeries or cuts? YES -    Have you ever had problems with anemia or been told to take iron pills? No   Have you had any abnormal blood loss such as black, tarry or bloody stools, or abnormal vaginal bleeding? No   Have you ever had a blood transfusion? No   Are you willing to have a blood transfusion if it is medically needed before, during, or after your surgery? Yes   Have you or any of your relatives ever had problems with anesthesia? No   Do you have sleep apnea, excessive snoring or daytime drowsiness? No   Do you have any artifical heart valves or other implanted medical devices like a pacemaker, defibrillator, or continuous glucose monitor? No   Do you have artificial joints? YES -left hip replacement   Are you allergic to latex? No         Health Care Directive:  Patient has a Health Care Directive on file.     Preoperative Review  of :    reviewed - controlled substances reflected in medication list.    CHF - Patient has a longstanding history of moderate-severe CHF. Exacerbating conditions include pulmonary hypertension. Currently the patient's condition is same. Current treatment regimen includes Angiotensin 2 receptor blocker, beta blocker and diuretic. The patient denies chest pain, edema, orthopnea, SOB or recent weight gain. Last Echocardiogram October 2020 with ejection fraction 40% and moderate pulmonary hypertension with mild LVH, EKG December 29, 2020, sinus rhythm on EKG.     COPD - Patient has a longstanding history of moderate-severe COPD . Patient has been doing well overall noting SOB and continues on medication regimen consisting of oxygen and inhalers and nebulizers without adverse reactions or side effects.      Review of Systems  Constitutional, neuro, ENT, endocrine, pulmonary, cardiac, gastrointestinal, genitourinary, musculoskeletal, integument and psychiatric systems are negative, except as otherwise noted.      Patient Active Problem List    Diagnosis Date Noted     Pain due to fracture      High risk medication use      Heart failure exacerbated by sotalol (H) 10/20/2020     T12 compression fracture (H) 10/19/2020     Acute on chronic diastolic congestive heart failure (H)      Metabolic encephalopathy      Low back pain 10/17/2020     Compression fracture of L1 vertebra, sequela      Chronic respiratory failure with hypoxia (H)      Chronic obstructive pulmonary disease, unspecified COPD type (H)      Chronic systolic congestive heart failure (H)      Hypoxia 10/16/2020     S/P hip replacement, left 07/10/2019     Hip pain, left 07/10/2019     Acute hypoxemic respiratory failure (H) 02/25/2019     Chest pain 02/24/2019     COPD exacerbation (H) 12/30/2018     Herpes zoster without complication      Chest wall pain      Benign essential hypertension      Dyslipidemia      Acute respiratory failure with hypoxia  (H)      Pulsatile tinnitus of both ears 12/10/2018     Hot flashes due to tamoxifen 03/30/2018     Pleural nodules 02/19/2018     Hot flashes, menopausal 09/20/2017     Malignant neoplasm of central portion of left female breast (H) 07/26/2017     CKD (chronic kidney disease) stage 3, GFR 30-59 ml/min 05/17/2017     Dyspnea 03/29/2016     Insomnia 11/24/2015     Leg pain, left 10/28/2015     Femur fracture (H) 10/28/2015     GERD (gastroesophageal reflux disease) 10/28/2015     Post menopausal syndrome 10/28/2015     OP (osteoporosis) 10/28/2015     Hypertension      Pulmonary emphysema, unspecified emphysema type (H)      Hyperlipidemia      Centrilobular emphysema (H) 09/22/2015     Anisocoria 07/31/2014     OAB (overactive bladder) 03/27/2014     Factor 5 Leiden mutation, heterozygous (H) 05/08/2013     Family history of blood disease 10/03/2012     Bladder incontinence 08/25/2011     Physical deconditioning 08/25/2011     Cardiomyopathy, idiopathic (H) 04/07/2011     Depression with anxiety 04/07/2011     Osteoarthritis of hip 04/07/2011     Herniated intervertebral disc 10/02/2009     Past Medical History:   Diagnosis Date     Arrhythmia      Breast cancer (H) 2017     CHF (congestive heart failure) (H)      COPD (chronic obstructive pulmonary disease) (H)      Coronary artery disease      GERD (gastroesophageal reflux disease)      Hx of radiation therapy 2017     Hyperlipidemia      Hypertension      Idiopathic cardiomyopathy (H)      Past Surgical History:   Procedure Laterality Date     BREAST BIOPSY Right 2017     BREAST LUMPECTOMY Left 06/2017    x2     CARDIAC CATHETERIZATION       CATARACT EXTRACTION Left 07/18/2017     NE OPEN TX FEMORAL FRACTURE DISTAL MED/LAT CONDYLE Left 10/28/2015    Procedure: OPEN REDUCTION INTERNAL FIXATION LEFT  PROXIMAL FEMUR PERIPROSTHETIC FRACTURE;  Surgeon: Yovanny Albarran MD;  Location: [facility];  Service: Orthopedics     REVISION TOTAL HIP ARTHROPLASTY Left       Current Outpatient Medications   Medication Sig Dispense Refill     acetaminophen (TYLENOL) 325 MG tablet Take 2 tablets (650 mg total) by mouth 3 (three) times a day. (Patient taking differently: Take 650 mg by mouth 4 (four) times a day. )  0     ADVAIR DISKUS 500-50 mcg/dose DISKUS USE 1 INHALATION TWICE A DAY 60 each 11     albuterol (PROAIR HFA;PROVENTIL HFA;VENTOLIN HFA) 90 mcg/actuation inhaler Inhale 2 puffs every 4 (four) hours as needed for wheezing. 1 Inhaler 0     ascorbic acid, vitamin C, (VITAMIN C) 1000 MG tablet Take 1,000 mg by mouth daily. Airborne 1 tab daily       azelastine (ASTELIN) 137 mcg (0.1 %) nasal spray 2 sprays into each nostril daily.       benzonatate (TESSALON) 200 MG capsule Take 1 capsule (200 mg total) by mouth 3 (three) times a day as needed for cough. 90 capsule 2     chlorhexidine (PERIDEX) 0.12 % solution Apply 15 mL to the mouth or throat at bedtime as needed.        cholecalciferol, vitamin D3, 1,000 unit tablet Take 1,000 Units by mouth daily.       diclofenac sodium (VOLTAREN) 1 % Gel Apply 4 g to area of pain in low back 4 times daily  0     EPINEPHrine (EPIPEN/ADRENACLICK/AUVI-Q) 0.3 mg/0.3 mL injection Inject 0.3 mg into the shoulder, thigh, or buttocks.       famotidine (PEPCID) 20 MG tablet Take 20 mg by mouth daily.       fluticasone (FLONASE) 50 mcg/actuation nasal spray Apply 1 spray into each nostril as needed.        gabapentin (NEURONTIN) 300 MG capsule Take 2 capsules (600 mg total) by mouth 2 (two) times a day. 360 capsule 2     ipratropium (ATROVENT HFA) 17 mcg/actuation inhaler Inhale 2 puffs every 6 (six) hours. 3 Inhaler 3     ipratropium-albuterol (DUO-NEB) 0.5-2.5 mg/3 mL nebulizer 1 neb 4 times a day until seen by primary and then as needed or as directed 60 vial 1     lidocaine (XYLOCAINE) 5 % ointment Apply to area of pain in low back 4 times daily 35.44 g 0     losartan (COZAAR) 50 MG tablet TAKE 1 TABLET DAILY 90 tablet 3     melatonin 3 mg Tab  tablet Take 1 tablet (3 mg total) by mouth at bedtime.  0     metoprolol succinate (TOPROL-XL) 50 MG 24 hr tablet TAKE ONE AND ONE-HALF TABLETS DAILY 135 tablet 3     mirabegron (MYRBETRIQ) 50 mg Tb24 ER tablet Take 1 tablet (50 mg total) by mouth daily. 90 tablet 3     omalizumab (XOLAIR) 150 mg/mL Syrg injection Inject 300 mg under the skin every 14 (fourteen) days.        oxyCODONE-acetaminophen (PERCOCET/ENDOCET) 5-325 mg per tablet TK 1 T PO Q 12 PRN       pantoprazole (PROTONIX) 40 MG tablet Take 1 tablet (40 mg total) by mouth daily. 90 tablet 4     polyethylene glycol (MIRALAX) 17 gram packet Take 1 packet (17 g total) by mouth daily as needed.  0     rivaroxaban ANTICOAGULANT (XARELTO) 20 mg tablet Take 20 mg by mouth at bedtime.       simvastatin (ZOCOR) 40 MG tablet Take 1 tablet (40 mg total) by mouth at bedtime. 90 tablet 3     sodium chloride (OCEAN) 0.65 % nasal spray 2 sprays into each nostril as needed.       solifenacin (VESICARE) 5 MG tablet Take 1 tablet (5 mg total) by mouth at bedtime. 90 tablet 3     tamoxifen (NOLVADEX) 20 MG tablet TAKE 1 TABLET DAILY 90 tablet 4     traZODone (DESYREL) 50 MG tablet TAKE 1 TABLET(50 MG) BY MOUTH AT BEDTIME AS NEEDED 90 tablet 2     venlafaxine (EFFEXOR-XR) 150 MG 24 hr capsule TAKE 1 CAPSULE DAILY 90 capsule 4     furosemide (LASIX) 40 MG tablet Take 1 tablet (40 mg total) by mouth 2 (two) times a day. 180 tablet 1     No current facility-administered medications for this visit.        Allergies   Allergen Reactions     Sulfa (Sulfonamide Antibiotics) Rash     Headaches and dizziness.     Homeopathic Drugs Unknown and Other (See Comments)     Comment: runny nose, Comment: runny nose     Mold Extracts      Morphine (Pf)      hallucinate     Lisinopril Cough, Unknown and Itching     Comment: cough, Comment: cough     Sulfacetamide Sodium Rash       Social History     Tobacco Use     Smoking status: Former Smoker     Quit date: 10/28/2007     Years since  "quittin.2     Smokeless tobacco: Former User     Quit date: 2004   Substance Use Topics     Alcohol use: No        Social History     Substance and Sexual Activity   Drug Use No        Objective     /84 (Patient Site: Left Arm, Patient Position: Sitting, Cuff Size: Adult Regular)   Pulse 64   Temp 97.3  F (36.3  C) (Other) Comment (Src): forehead  Ht 4' 11.75\" (1.518 m)   Wt 138 lb 11.2 oz (62.9 kg)   LMP 1992   BMI 27.32 kg/m    Physical Exam  Frail elderly female with severe kyphosis.  Skin on back without erythema or cyst or rash.  Lungs are clear to auscultation bilaterally without wheezing or crackles.  Heart regular without murmur.  No ankle edema.  Abdomen is nontender.  No carotid bruits.    Recent Labs   Lab Test 21  1404 21  1046 10/26/20  0955 10/26/20  0955   HGB 16.1*  --   --  14.5     --   --  185    143   < > 141   K 3.7 3.5   < > 4.7   CREATININE 1.35* 1.54*   < > 1.41*    < > = values in this interval not displayed.        PRE-OP Diagnostics:   Recent Results (from the past 240 hour(s))   COVID-19 VIRUS (CORONAVIRUS) BY PCR - EXTERNAL RESULT    Collection Time: 21 11:00 AM    Specimen type and source: Swab (Source Required),    Result Value Ref Range    COVID-19 Virus by PCR (External Result) Not Detected Not Detected   Comprehensive Metabolic Panel    Collection Time: 21  2:04 PM   Result Value Ref Range    Sodium 140 136 - 145 mmol/L    Potassium 3.7 3.5 - 5.0 mmol/L    Chloride 103 98 - 107 mmol/L    CO2 25 22 - 31 mmol/L    Anion Gap, Calculation 12 5 - 18 mmol/L    Glucose 95 70 - 125 mg/dL    BUN 18 8 - 28 mg/dL    Creatinine 1.35 (H) 0.60 - 1.10 mg/dL    GFR MDRD Af Amer 46 (L) >60 mL/min/1.73m2    GFR MDRD Non Af Amer 38 (L) >60 mL/min/1.73m2    Bilirubin, Total 0.9 0.0 - 1.0 mg/dL    Calcium 8.3 (L) 8.5 - 10.5 mg/dL    Protein, Total 7.2 6.0 - 8.0 g/dL    Albumin 3.6 3.5 - 5.0 g/dL    Alkaline Phosphatase 63 45 - 120 U/L    " AST 22 0 - 40 U/L    ALT 12 0 - 45 U/L   HM2(CBC w/o Differential)    Collection Time: 01/21/21  2:04 PM   Result Value Ref Range    WBC 8.0 4.0 - 11.0 thou/uL    RBC 5.42 (H) 3.80 - 5.40 mill/uL    Hemoglobin 16.1 (H) 12.0 - 16.0 g/dL    Hematocrit 50.5 (H) 35.0 - 47.0 %    MCV 93 80 - 100 fL    MCH 29.7 27.0 - 34.0 pg    MCHC 31.9 (L) 32.0 - 36.0 g/dL    RDW 12.7 11.0 - 14.5 %    Platelets 240 140 - 440 thou/uL    MPV 7.4 7.0 - 10.0 fL     No EKG required for low risk surgery (cataract, skin procedure, breast biopsy, etc).  Local anesthesia only planned.  See EKG from last month as above.  REVISED CARDIAC RISK INDEX (RCRI)   The patient has the following serious cardiovascular risks for perioperative complications:   - Cerebrovascular Disease (TIA or CVA) = 1 point    RCRI INTERPRETATION: 0 points: Class I (very low risk - 0.4% complication rate)         Assessment & Plan      The proposed surgical procedure is considered LOW risk.      Assessment/Plan  1. Preoperative examination  Patient is cleared to proceed with vertebroplasty of her T12 compression fracture from last October.  Continued chronic back pain.    I did discuss with patient that the vertebroplasty may not be successful to reduce her pain and does have a risk of even worsening pain.  Also risk of causing adjacent compression fracture and adjacent vertebral body.  Also discussed risk of cement extravasation nerve root injury with procedure.  Also risk of infection.    I also discussed risk of exacerbating her respiratory condition with her chronic hypoxic respiratory failure, COPD and CHF.    I also discussed the risk of a stroke with coming off her Xarelto for this procedure.  Patient accepts risk of stroke, exacerbation of her pulmonary and heart condition and other risks including above.  Patient was given alternative treatment plan of conservative treatment without vertebroplasty.  Patient did not want to pursue further conservative  treatment.    Patient is cleared only for local anesthesia.  She should not have sedating medications with her chronic respiratory hypoxia and heart and lung condition.    Continue on oxygen supplementation during procedure.    See patient instructions below for medication management for procedure.  - Comprehensive Metabolic Panel  - HM2(CBC w/o Differential)    January 18, 2021 Covid test was negative.    2. Compression fracture of T12 vertebra with routine healing, subsequent encounter  As above.    3. Age-related osteoporosis with current pathological fracture with delayed healing, subsequent encounter  Plan to restart Prolia this spring, patient has follow-up with me next month.  She may need to be reauthorized for the Prolia.       4. Chronic respiratory failure with hypoxia (H)  She does not wear her oxygen all the time.  I did asked the patient bring her oxygen with her to this procedure.    5. Chronic obstructive pulmonary disease, unspecified COPD type (H)  No flare at this time.  On Advair and DuoNeb.  No longer on daily azithromycin.    6. Chronic systolic congestive heart failure (H)  October 2020 heart echo with ejection fraction 40%.  Moderate pulmonary hypertension and LVH.    She will skip her furosemide on the morning of surgery but take the losartan with a sip of water.  Metoprolol was taken in the evening.  - furosemide (LASIX) 40 MG tablet; Take 1 tablet (40 mg total) by mouth 2 (two) times a day.  Dispense: 180 tablet; Refill: 1    7. Chronic renal insufficiency, stage 3 (moderate)  Baseline creatinine level 1.4-1.5.  - Comprehensive Metabolic Panel    8. History of breast cancer  2017 lumpectomy with radiation of the chest.  May 2020 mammogram negative.  She is on tamoxifen.    9. History of stroke  She has a heterozygous factor V Leiden mutation with history of occipital stroke.  She has not been taking the aspirin, that was taken off her medication list.    She is on Xarelto.  I did explain  in detail that she would put herself at risk for stroke with being off the Xarelto for this procedure.  She accepts that risk.  Also risk of bleeding with restarting Xarelto.    She will hold Xarelto for 2 days prior to the procedure and day of procedure.        Return in about 2 months (around 3/21/2021) for Recheck.   Patient Instructions   Do not take your Xarelto for 2 days prior to the procedure or the day of procedure.  Restart Xarelto at the usual dose the day after procedure.    Aspirin has been taken off your medication list.    Take your losartan and pantoprazole with a sip of water on the morning of the procedure.  Do not take your furosemide on the morning of the procedure.    Take your inhalers as usual.  Bring your oxygen with you to the procedure.    You have been cleared just for local anesthesia.  Sedating medications may cause low oxygen levels for you.    Okay to change your February 19 appointment to 2 months from now, after you have had the COVID-19 vaccine.        Pankaj Montanez MD        Current Outpatient Medications   Medication Sig Dispense Refill     acetaminophen (TYLENOL) 325 MG tablet Take 2 tablets (650 mg total) by mouth 3 (three) times a day. (Patient taking differently: Take 650 mg by mouth 4 (four) times a day. )  0     ADVAIR DISKUS 500-50 mcg/dose DISKUS USE 1 INHALATION TWICE A DAY 60 each 11     albuterol (PROAIR HFA;PROVENTIL HFA;VENTOLIN HFA) 90 mcg/actuation inhaler Inhale 2 puffs every 4 (four) hours as needed for wheezing. 1 Inhaler 0     ascorbic acid, vitamin C, (VITAMIN C) 1000 MG tablet Take 1,000 mg by mouth daily. Airborne 1 tab daily       azelastine (ASTELIN) 137 mcg (0.1 %) nasal spray 2 sprays into each nostril daily.       benzonatate (TESSALON) 200 MG capsule Take 1 capsule (200 mg total) by mouth 3 (three) times a day as needed for cough. 90 capsule 2     chlorhexidine (PERIDEX) 0.12 % solution Apply 15 mL to the mouth or throat at bedtime as needed.         cholecalciferol, vitamin D3, 1,000 unit tablet Take 1,000 Units by mouth daily.       diclofenac sodium (VOLTAREN) 1 % Gel Apply 4 g to area of pain in low back 4 times daily  0     EPINEPHrine (EPIPEN/ADRENACLICK/AUVI-Q) 0.3 mg/0.3 mL injection Inject 0.3 mg into the shoulder, thigh, or buttocks.       famotidine (PEPCID) 20 MG tablet Take 20 mg by mouth daily.       fluticasone (FLONASE) 50 mcg/actuation nasal spray Apply 1 spray into each nostril as needed.        gabapentin (NEURONTIN) 300 MG capsule Take 2 capsules (600 mg total) by mouth 2 (two) times a day. 360 capsule 2     ipratropium (ATROVENT HFA) 17 mcg/actuation inhaler Inhale 2 puffs every 6 (six) hours. 3 Inhaler 3     ipratropium-albuterol (DUO-NEB) 0.5-2.5 mg/3 mL nebulizer 1 neb 4 times a day until seen by primary and then as needed or as directed 60 vial 1     lidocaine (XYLOCAINE) 5 % ointment Apply to area of pain in low back 4 times daily 35.44 g 0     losartan (COZAAR) 50 MG tablet TAKE 1 TABLET DAILY 90 tablet 3     melatonin 3 mg Tab tablet Take 1 tablet (3 mg total) by mouth at bedtime.  0     metoprolol succinate (TOPROL-XL) 50 MG 24 hr tablet TAKE ONE AND ONE-HALF TABLETS DAILY 135 tablet 3     mirabegron (MYRBETRIQ) 50 mg Tb24 ER tablet Take 1 tablet (50 mg total) by mouth daily. 90 tablet 3     omalizumab (XOLAIR) 150 mg/mL Syrg injection Inject 300 mg under the skin every 14 (fourteen) days.        oxyCODONE-acetaminophen (PERCOCET/ENDOCET) 5-325 mg per tablet TK 1 T PO Q 12 PRN       pantoprazole (PROTONIX) 40 MG tablet Take 1 tablet (40 mg total) by mouth daily. 90 tablet 4     polyethylene glycol (MIRALAX) 17 gram packet Take 1 packet (17 g total) by mouth daily as needed.  0     rivaroxaban ANTICOAGULANT (XARELTO) 20 mg tablet Take 20 mg by mouth at bedtime.       simvastatin (ZOCOR) 40 MG tablet Take 1 tablet (40 mg total) by mouth at bedtime. 90 tablet 3     sodium chloride (OCEAN) 0.65 % nasal spray 2 sprays into each nostril as  needed.       solifenacin (VESICARE) 5 MG tablet Take 1 tablet (5 mg total) by mouth at bedtime. 90 tablet 3     tamoxifen (NOLVADEX) 20 MG tablet TAKE 1 TABLET DAILY 90 tablet 4     traZODone (DESYREL) 50 MG tablet TAKE 1 TABLET(50 MG) BY MOUTH AT BEDTIME AS NEEDED 90 tablet 2     venlafaxine (EFFEXOR-XR) 150 MG 24 hr capsule TAKE 1 CAPSULE DAILY 90 capsule 4     furosemide (LASIX) 40 MG tablet Take 1 tablet (40 mg total) by mouth 2 (two) times a day. 180 tablet 1     No current facility-administered medications for this visit.      Allergies   Allergen Reactions     Sulfa (Sulfonamide Antibiotics) Rash     Headaches and dizziness.     Homeopathic Drugs Unknown and Other (See Comments)     Comment: runny nose, Comment: runny nose     Mold Extracts      Morphine (Pf)      hallucinate     Lisinopril Cough, Unknown and Itching     Comment: cough, Comment: cough     Sulfacetamide Sodium Rash     Social History     Tobacco Use     Smoking status: Former Smoker     Quit date: 10/28/2007     Years since quittin.2     Smokeless tobacco: Former User     Quit date: 2004   Substance Use Topics     Alcohol use: No     Drug use: No             RECOMMENDATION:  APPROVAL GIVEN to proceed with proposed procedure, without further diagnostic evaluation.    Signed Electronically by: Pankaj Montanez MD    Copy of this evaluation report is provided to requesting physician.    [facility] Guidelines    Revised Cardiac Risk Index

## 2021-06-14 NOTE — PROGRESS NOTES
I met with Stephanei today to present her SCP. While reviewing the treatment summary, she had many questions about why she needs a hormone blocker and where is the estrogen in her body since she is post menopausal, etc. I answered her questions to the best of my ability and she expressed understanding. I pointed out the other resources included in the plan, HE class schedule, survivorship resource list, Facing Forward book. I told her my business card is included should she need any additional resources and that I would like to call her back on follow up for feedback. I congratulated her on her survivorship and thanked her for her time. [place]

## 2021-06-14 NOTE — PROGRESS NOTES
Clinic Note    Assessment:     Assessment and Plan:    1. Compression fracture of T12 vertebra with delayed healing, subsequent encounter  She was originally scheduled for a preoperative exam with me today.  However, it is unclear to me whether the benefits of the surgery outweigh the risks associated with the procedure.  She was open to the idea of rescheduling her preop with her PCP tomorrow.        Patient Instructions   You have an appointment with Dr. Darling tomorrow at 3:20 PM.  Please arrive 10 to 15 minutes early.    You need to have a discussion with Dr. Darling about the risks versus benefits of surgery.    We also need to have a discussion on your underlying chronic medical conditions, and medications leading up to surgery.           Subjective:      Patient comes the clinic today for preoperative exam.    She was hospitalized on October 16, 2020 with nontraumatic acute back pain.  CT scan from that time did show a 50% T12 compression fracture.    She had simultaneous CHF exacerbation thought to be due to medication noncompliance.    She was discharged to TCU on significant narcotics.  She had significant encephalopathy and confusion on narcotics at that time.    No longer using TLSO brace continuously.  Now using it intermittently as needed.    Recently saw Dr. Philip at [facility] on 1/6 for follow-up of her vertebral compression fracture.  At that time, she had x-ray imaging which revealed worsening fracture deformity.    It was recommended that she have vertebral augmentation with kyphoplasty and vertebroplasty to treat the intractable pain.  She was prescribed oxycodone, now using half tablet daily    She has a litany of chronic medical issues, including chronic systolic congestive heart failure, chronic respiratory failure, chronic renal insufficiency, factor V Leiden mutation, and CVA.    The following portions of the patient's history were reviewed and updated as appropriate: Allergies, medications,  problem list, prior note.     Review of Systems:    Review is otherwise negative except for what is mentioned above.     Social Hx:    Social History     Tobacco Use   Smoking Status Former Smoker     Quit date: 10/28/2007     Years since quittin.2   Smokeless Tobacco Former User     Quit date: 2004         Objective:     Vitals:    21 1252   BP: 116/62   Pulse: 64   Weight: 142 lb 12.8 oz (64.8 kg)       Exam:    General: No apparent distress. Calm. Alert and Oriented X3. Pt behavior is appropriate.  Patient in wheelchair      Patient Active Problem List   Diagnosis     Leg pain, left     Femur fracture (H)     Hypertension     Pulmonary emphysema, unspecified emphysema type (H)     Hyperlipidemia     Bladder incontinence     Cardiomyopathy, idiopathic (H)     Depression with anxiety     Family history of blood disease     GERD (gastroesophageal reflux disease)     Post menopausal syndrome     OP (osteoporosis)     Osteoarthritis of hip     Insomnia     Dyspnea     Malignant neoplasm of central portion of left female breast (H)     Pleural nodules     Hot flashes due to tamoxifen     COPD exacerbation (H)     Herpes zoster without complication     Chest wall pain     Benign essential hypertension     Dyslipidemia     Acute respiratory failure with hypoxia (H)     Factor 5 Leiden mutation, heterozygous (H)     CKD (chronic kidney disease) stage 3, GFR 30-59 ml/min     Centrilobular emphysema (H)     Anisocoria     Herniated intervertebral disc     Hot flashes, menopausal     OAB (overactive bladder)     Physical deconditioning     Pulsatile tinnitus of both ears     Chest pain     Acute hypoxemic respiratory failure (H)     S/P hip replacement, left     Hip pain, left     Hypoxia     Low back pain     Compression fracture of L1 vertebra, sequela     Chronic respiratory failure with hypoxia (H)     Chronic obstructive pulmonary disease, unspecified COPD type (H)     Chronic systolic congestive heart  failure (H)     Metabolic encephalopathy     T12 compression fracture (H)     Acute on chronic diastolic congestive heart failure (H)     Heart failure exacerbated by sotalol (H)     Pain due to fracture     High risk medication use     Current Outpatient Medications   Medication Sig Dispense Refill     acetaminophen (TYLENOL) 325 MG tablet Take 2 tablets (650 mg total) by mouth 3 (three) times a day. (Patient taking differently: Take 650 mg by mouth 4 (four) times a day. )  0     ADVAIR DISKUS 500-50 mcg/dose DISKUS USE 1 INHALATION TWICE A DAY 60 each 11     albuterol (PROAIR HFA;PROVENTIL HFA;VENTOLIN HFA) 90 mcg/actuation inhaler Inhale 2 puffs every 4 (four) hours as needed for wheezing. 1 Inhaler 0     ascorbic acid, vitamin C, (VITAMIN C) 1000 MG tablet Take 1,000 mg by mouth daily. Airborne 1 tab daily       aspirin 81 MG EC tablet Take 81 mg by mouth daily.       azelastine (ASTELIN) 137 mcg (0.1 %) nasal spray 2 sprays into each nostril daily.       benzonatate (TESSALON) 200 MG capsule Take 1 capsule (200 mg total) by mouth 3 (three) times a day as needed for cough. 90 capsule 2     chlorhexidine (PERIDEX) 0.12 % solution Apply 15 mL to the mouth or throat at bedtime as needed.        cholecalciferol, vitamin D3, 1,000 unit tablet Take 1,000 Units by mouth daily.       diclofenac sodium (VOLTAREN) 1 % Gel Apply 4 g to area of pain in low back 4 times daily  0     EPINEPHrine (EPIPEN/ADRENACLICK/AUVI-Q) 0.3 mg/0.3 mL injection Inject 0.3 mg into the shoulder, thigh, or buttocks.       famotidine (PEPCID) 20 MG tablet Take 20 mg by mouth daily.       fluticasone (FLONASE) 50 mcg/actuation nasal spray Apply 1 spray into each nostril as needed.        furosemide (LASIX) 80 MG tablet Take 40 mg by mouth 2 (two) times a day at 9am and 6pm.        gabapentin (NEURONTIN) 300 MG capsule Take 2 capsules (600 mg total) by mouth 2 (two) times a day. 360 capsule 2     ipratropium (ATROVENT HFA) 17 mcg/actuation  inhaler Inhale 2 puffs every 6 (six) hours. 3 Inhaler 3     ipratropium-albuterol (DUO-NEB) 0.5-2.5 mg/3 mL nebulizer 1 neb 4 times a day until seen by primary and then as needed or as directed 60 vial 1     lidocaine (XYLOCAINE) 5 % ointment Apply to area of pain in low back 4 times daily 35.44 g 0     losartan (COZAAR) 50 MG tablet TAKE 1 TABLET DAILY 90 tablet 3     melatonin 3 mg Tab tablet Take 1 tablet (3 mg total) by mouth at bedtime.  0     metoprolol succinate (TOPROL-XL) 50 MG 24 hr tablet TAKE ONE AND ONE-HALF TABLETS DAILY 135 tablet 3     mirabegron (MYRBETRIQ) 50 mg Tb24 ER tablet Take 1 tablet (50 mg total) by mouth daily. 90 tablet 3     omalizumab (XOLAIR) 150 mg/mL Syrg injection Inject 300 mg under the skin every 14 (fourteen) days.        oxyCODONE-acetaminophen (PERCOCET/ENDOCET) 5-325 mg per tablet TK 1 T PO Q 12 PRN       pantoprazole (PROTONIX) 40 MG tablet Take 1 tablet (40 mg total) by mouth daily. 90 tablet 4     polyethylene glycol (MIRALAX) 17 gram packet Take 1 packet (17 g total) by mouth daily as needed.  0     rivaroxaban ANTICOAGULANT (XARELTO) 20 mg tablet Take 20 mg by mouth at bedtime.       senna-docusate (PERICOLACE) 8.6-50 mg tablet Take 1 tablet by mouth daily as needed for constipation.  0     simvastatin (ZOCOR) 40 MG tablet Take 1 tablet (40 mg total) by mouth at bedtime. 90 tablet 3     sodium chloride (OCEAN) 0.65 % nasal spray 2 sprays into each nostril as needed.       solifenacin (VESICARE) 5 MG tablet Take 1 tablet (5 mg total) by mouth at bedtime. 90 tablet 3     tamoxifen (NOLVADEX) 20 MG tablet TAKE 1 TABLET DAILY 90 tablet 4     traZODone (DESYREL) 50 MG tablet TAKE 1 TABLET(50 MG) BY MOUTH AT BEDTIME AS NEEDED 90 tablet 2     venlafaxine (EFFEXOR-XR) 150 MG 24 hr capsule TAKE 1 CAPSULE DAILY 90 capsule 4     No current facility-administered medications for this visit.            Aakash Flores CNP (Rob)    1/19/2021

## 2021-06-15 NOTE — TELEPHONE ENCOUNTER
Stephanie has been experiencing increased swelling in both her feet and ankles. She reports that they are like marshmallows x 4 days. Her weight today was 142.5#  And she feels her baseline weight is chaparro like 136#. She is using Lasix 40mg two times a day presently.   Stephanie has been coughing, moving secretions with some wheezing as well. She denies fever. Some shortness of breath noted with activity.Denies dizziness, lightheadedness or chestpain. Denies abdominal fullness or distention.    Samantha any new recommendations at this time? sk/RN

## 2021-06-15 NOTE — PROGRESS NOTES
Clinic Care Coordination Contact  Community Health Worker Initial Outreach            Patient accepts CC: No, Patient stated that she was fine and did not need anything . Patient will be sent Care Coordination introduction letter for future reference.

## 2021-06-15 NOTE — TELEPHONE ENCOUNTER
The patient was informed of the clinician message and verbalized understanding. She was given the [phone] phone number to reschedule her covid-19 vaccine 1st dose appointment. She was instructed to call tomorrow, Tuesday 2/23/21, at 8 AM to check for additional vaccine appointments at the [facility] location. She declined an appointment at the [place] location.

## 2021-06-15 NOTE — PROGRESS NOTES
Pre Vitals: BP (!) 88/64   Temp 97.8  F (36.6  C) (Oral)   LMP 11/02/1992   SpO2 99%     Post Vitals: BP 90/68   Temp 97.8  F (36.6  C) (Oral)   LMP 11/02/1992   SpO2 99%     Patient presented to infusion for IV lasix. Provider notified of low BP (asymptomatic), given go ahead to administer medication. Had lots of trouble with IV placement. 1st IV infiltrated. Eventually got PICC team to place IV to left AC. 80mg IV lasix given. Tolerated well. PIV removed and coban/gauze dressing placed. BP remains similar to pre infusion value. Patient left clinic independently.

## 2021-06-15 NOTE — TELEPHONE ENCOUNTER
Stephanie is contacted with recommendations to go in for IV Lasix infusion 80mg x1 at the [facility] she [facility]. They can see her on Wednesday at 2pm. BMP to be drawn at this visit also.  Orders placed.sk/RN

## 2021-06-15 NOTE — TELEPHONE ENCOUNTER
Please increase lasix to 80 mg in the AM and 40 mg in the PM for 3 days then continue 40 mg two times a day thereafter. Thank you

## 2021-06-15 NOTE — PROGRESS NOTES
Medication Therapy Management (MTM) Encounter    Assessment:                                                      1. Acute on chronic diastolic congestive heart failure   Recently hospitalized and improved with diuresis. Reviewed use of home diuretic which she verifies taking regularly. Reiterated importance of regular diuretic use and monitoring weights daily as well as following low-sodium diet.    2. Acute cystitis without hematuria  Treated with 3-day course of cephalexin. Patient verifies finishing antibiotic. No ongoing symptoms.    3. Chronic obstructive pulmonary disease  Treated suspected COPD exacerbation with course of doxycycline and prednisone at discharge. Patient reports breathing well at this point. Discussed possible addition of LAMA inhaler by changing Advair to Trelegy Ellipta, which may help reduce exacerbation risk. She has discussed this with her pulmonologist previously and will be following up.    4. Senile osteoporosis  Recent compression fracture. Previous bisphosphonate use, now on Prolia. Verifies taking vitamin D and calcium regularly.    5. Factor V Leiden mutation   Appropriately on anticoagulation.    6. Hot flashes due to tamoxifen  Well controlled with use of SNRI.     7. Insomnia  Well controlled with use of melatonin and as needed trazodone.    8. Urinary incontinence  Well-controlled with current medication regimen with minimal, but tolerable occasional dry mouth.    Plan:                                                       Follow up with PCP as scheduled.     Follow Up  PRN with MTM    Subjective & Objective                                                       Irene León is a 76 y.o. female called for a transitions of care visit. she was discharged from [facility] on 2/25/21 for acute CHF.    Patient consented to a telehealth visit: Yes  Chief Complaint: Hospital discharge medication review  Medication Adherence/Access: Good; no issues identified. States she's been on the  same medications for years and feels they are working well.     Per discharge summary:  Irene León is a 76 y.o. female with a past medical history of factor V Leiden with cerebral vascular disease (on Xarelto), chronic diastolic congestive heart failure, depression and anxiety, COPD/reactive airway disease who presented with worsening shortness of breath and wheezing over the past week, found to be hypoxic and have BNP of 1400. Patient was diuresed aggressively with intravenous lasix, and ultimately net negative 5 L this admission. Leg swelling and shortness of breath improved to her normal baseline.    Patient reports feeling better since discharge.  She verifies taking her diuretic furosemide twice daily as prescribed.  No concerns with excessive swelling or weight gain.  She does weigh herself daily.  She knows to follow a low-sodium diet.  She does have a blood pressure cuff and checks daily.  Her blood pressure yesterday was 136/76.  She also takes simvastatin for cholesterol management.  No adverse effects noted.    She was also discharged on doxycycline and prednisone course for presumed COPD exacerbation.  Chest x-ray demonstrated atelectasis and it was suspected that she had a baseline oxygen requirement from COPD.  She is using oxygen at home.  She is has finished antibiotics and prednisone and feels her breathing is improved.  She is using Advair twice daily and rinse his mouth after use.  She also has Atrovent and albuterol that she says she uses as needed.  Follows with pulmonology.  Has underlying asthma.  Receives Xolair monoclonal antibody.  States she has a breathing exacerbation about once a year usually during the summer with humidity.    She also verifies finishing course of Keflex for detected E. coli UTI.  No ongoing urinary symptoms.    She reports having back pain related to a fracture recently.  Using Percocet half tablet to a full tablet typically once per day for any back spasm.   Tries to limit use.  Denies any constipation or other side effects.  For osteoporosis she has been on Fosamax, which she recalls for about 10 years but then most recently was switched to Prolia.  Has received 1 injection and do for her next soon.  She is taking calcium and vitamin D supplement.    She takes gabapentin for nerve pain in her thumbs from arthritis. Finds it very helpful.     She is on Xarelto 20 mg daily for factor V Leiden and history of cerebrovascular disease. Bruises easily, but no concerns with bleeding.     For urinary incontinence she is on a combination of Vesicare and Myrbetriq. Finds these effective. Dry mouth once awhile, but minimal and not bothersome.     She takes Effexor  mg every evening for night sweats, which is helpful. She is on tamoxifen for history breast cancer. She typically takes melatonin to help with sleep.  She also has trazodone which she only uses as needed.      PMH: reviewed in EPIC   Allergies/ADRs: reviewed in EPIC   Alcohol: none  Tobacco:   Social History     Tobacco Use   Smoking Status Former Smoker     Quit date: 10/28/2007     Years since quittin.3   Smokeless Tobacco Former User     Quit date: 2004     Recent Vitals:   BP Readings from Last 3 Encounters:   21 149/65   02/10/21 90/68   21 120/70      Wt Readings from Last 3 Encounters:   21 139 lb 1.6 oz (63.1 kg)   21 138 lb 11.2 oz (62.9 kg)   21 142 lb 12.8 oz (64.8 kg)     ----------------  Post Discharge Medication Reconciliation Status: discharge medications reconciled, continue medications without change    The patient was given a summary of these recommendations via [employer]    I spent 30 minutes with this patient today;  . All changes were made via collaborative practice agreement with Pankaj Montanez MD. A copy of the visit note was provided to the patient's provider.     Anabela Houser, PharmD, BCACP  Medication Management (MTM) Pharmacist  [facility]     Telemedicine Visit Details    Type of service:  Telephone     Start Time: 1:00 PM  End Time (time video/phone call stopped): 1:30 PM    Originating Location (pt. Location): Home    Distant Location (provider location):  [place] [facility]    Mode of Communication:   Telephone     Current Outpatient Medications   Medication Sig Dispense Refill     acetaminophen (TYLENOL) 325 MG tablet Take 650 mg by mouth 4 (four) times a day.       ADVAIR DISKUS 500-50 mcg/dose DISKUS USE 1 INHALATION TWICE A DAY 60 each 11     albuterol (PROAIR HFA;PROVENTIL HFA;VENTOLIN HFA) 90 mcg/actuation inhaler Inhale 2 puffs every 4 (four) hours as needed for wheezing. 1 Inhaler 0     ascorbic acid, vitamin C, (VITAMIN C) 1000 MG tablet Take 1,000 mg by mouth daily. Airborne 1 tab daily       azelastine (ASTELIN) 137 mcg (0.1 %) nasal spray 2 sprays into each nostril daily.       benzonatate (TESSALON) 200 MG capsule Take 1 capsule (200 mg total) by mouth 3 (three) times a day as needed for cough. 90 capsule 0     cephalexin (KEFLEX) 500 MG capsule Take 1 capsule (500 mg total) by mouth 2 (two) times a day for 1 day. UTI 1 capsule 0     chlorhexidine (PERIDEX) 0.12 % solution Apply 15 mL to the mouth or throat at bedtime as needed.        cholecalciferol, vitamin D3, 1,000 unit tablet Take 1,000 Units by mouth daily.       diclofenac sodium (VOLTAREN) 1 % Gel Apply topically 4 (four) times a day as needed.       doxycycline (MONODOX) 100 MG capsule Take 1 capsule (100 mg total) by mouth 2 (two) times a day for 2 days. COPD exacerbation 4 capsule 0     EPINEPHrine (EPIPEN/ADRENACLICK/AUVI-Q) 0.3 mg/0.3 mL injection Inject 0.3 mg into the shoulder, thigh, or buttocks.       famotidine (PEPCID) 20 MG tablet Take 20 mg by mouth daily.       fluticasone (FLONASE) 50 mcg/actuation nasal spray Apply 1 spray into each nostril as needed.        furosemide (LASIX) 40 MG  tablet Take 1 tablet (40 mg total) by mouth 2 (two) times a day. 180 tablet 1     gabapentin (NEURONTIN) 300 MG capsule Take 2 capsules (600 mg total) by mouth 2 (two) times a day. 360 capsule 2     ipratropium (ATROVENT HFA) 17 mcg/actuation inhaler Inhale 2 puffs every 6 (six) hours. 3 Inhaler 3     ipratropium-albuterol (DUO-NEB) 0.5-2.5 mg/3 mL nebulizer 1 neb 4 times a day until seen by primary and then as needed or as directed 60 vial 1     lidocaine (XYLOCAINE) 5 % ointment Apply topically 4 (four) times a day as needed.       losartan (COZAAR) 50 MG tablet TAKE 1 TABLET DAILY 90 tablet 3     melatonin 3 mg Tab tablet Take 1 tablet (3 mg total) by mouth at bedtime.  0     metoprolol succinate (TOPROL-XL) 50 MG 24 hr tablet TAKE ONE AND ONE-HALF TABLETS DAILY 135 tablet 3     mirabegron (MYRBETRIQ) 50 mg Tb24 ER tablet Take 1 tablet (50 mg total) by mouth daily. 90 tablet 3     omalizumab (XOLAIR) 150 mg/mL Syrg injection Inject 300 mg under the skin every 14 (fourteen) days.        oxyCODONE-acetaminophen (PERCOCET/ENDOCET) 5-325 mg per tablet TK 1 T PO Q 12 PRN       pantoprazole (PROTONIX) 40 MG tablet Take 1 tablet (40 mg total) by mouth daily. 90 tablet 4     polyethylene glycol (MIRALAX) 17 gram packet Take 1 packet (17 g total) by mouth daily as needed.  0     predniSONE (DELTASONE) 20 MG tablet Take 40 mg by mouth daily with breakfast for 2 days. 4 tablet 0     rivaroxaban ANTICOAGULANT (XARELTO) 20 mg tablet Take 1 tablet (20 mg total) by mouth at bedtime. 90 tablet 3     simvastatin (ZOCOR) 40 MG tablet Take 1 tablet (40 mg total) by mouth at bedtime. 90 tablet 3     sodium chloride (OCEAN) 0.65 % nasal spray 2 sprays into each nostril as needed.       solifenacin (VESICARE) 5 MG tablet Take 1 tablet (5 mg total) by mouth at bedtime. 90 tablet 3     tamoxifen (NOLVADEX) 20 MG tablet TAKE 1 TABLET DAILY 90 tablet 4     traZODone (DESYREL) 50 MG tablet TAKE 1 TABLET(50 MG) BY MOUTH AT BEDTIME AS NEEDED  90 tablet 2     venlafaxine (EFFEXOR-XR) 150 MG 24 hr capsule TAKE 1 CAPSULE DAILY 90 capsule 3     No current facility-administered medications for this visit.         Medication Therapy Recommendations  No medication therapy recommendations to display

## 2021-06-15 NOTE — TELEPHONE ENCOUNTER
Patient called with recommendations for cough and diuretic, will follow-up Monday with patient. AB/RN

## 2021-06-15 NOTE — PROGRESS NOTES
Post Discharge Medication Reconciliation Status: discharge medications reconciled and changed, per note/orders      Clinic Note    Assessment:     Assessment and Plan:    1. COPD exacerbation (H)  We will restart her daily azithromycin tablet.  She has follow-up at the end of this month with her pulmonologist at [facility].  There may be a discussion about using a different daily controller medication at that visit.  For now, continue Advair.  She feels better when she uses her continuous oxygen, which she is not using right now in the exam room.    We will help her get her first dose of the COVID-19 vaccine today.    - azithromycin (ZITHROMAX) 250 MG tablet; Take 1 tablet (250 mg total) by mouth daily.  Dispense: 90 tablet; Refill: 0    2. Acute on chronic diastolic congestive heart failure (H)  Lasix 40 mg twice daily.  She did not take her morning dose of Lasix today in anticipation of this appointment.  She understands the importance of taking her medications as prescribed.  She appears compensated today.  She has follow-up with the CHF nurse practitioner in 2 weeks  - Basic Metabolic Panel    3. Stage 3a chronic kidney disease  Recheck BMP.  Unfortunately, she used some ibuprofen over the last few days for some back pain.    4. Compression fracture of T12 vertebra with routine healing, subsequent encounter  She never followed through with the vertebroplasty last month.  Tylenol for pain.  She has home health care set up so that she is safe.  She knows she needs to move slowly and carefully to mitigate fall risk    5. Essential hypertension  Elevated today but she did not take her morning dose of Lasix.    6. Acute cystitis without hematuria  She took the Keflex as prescribed upon discharge from the hospital.  No longer having dysuria symptoms.       Patient Instructions   Follow-up with Samantha heart failure nurse practitioner as scheduled.    Continue with Lasix 40 mg twice daily.    Recheck kidney labs and  electrolytes today.    Follow-up with pulmonologist as scheduled.  For now, I think it is reasonable to restart your daily azithromycin treatment.  You can revisit this at your follow-up pulmonology appointment.    We will arrange for your first COVID-19 vaccination today.  We will help your  get the shot as well.    Continue monitoring weights daily.    Do not use ibuprofen for pain.  Tylenol instead.    Follow-up with Dr. Pankaj Montanez sometime within the next 1 to 3 months    Return in about 1 month (around 4/2/2021).         Subjective:      Irene León is a 76 y.o. female who comes the clinic today for hospital discharge follow-up.    She was having progressively worsening shortness of breath when she presented to the ED and respiratory distress.  Her O2 saturations were in the mid 80s.  She had lower extremity swelling.  She was aggressively diuresed while in the hospital and responded well to that.  She was also found to be dealing with a COPD exacerbation and discharged on doxycycline and prednisone.    She was found to have a UTI and was discharged on Keflex.    She has taken her medications as prescribed.    Still on Advair.  Doing home O2 therapy.  She has a portable unit but did not bring it with her to her appointment today and is not using oxygen.    She admits that she did not take her morning dose of furosemide today.    Her weights have been stable.  She has been avoiding salt.    Unfortunately, she continues to have some low back pain.  She never followed through with the vertebroplasty as planned last month.  She took about 6 ibuprofen over the last 2 days.  She has a history of chronic kidney disease.    The following portions of the patient's history were reviewed and updated as appropriate: Allergies, medications, problems, prior note.    Review of Systems:    Review is otherwise negative except for what is mentioned above.     Social Hx:    Social History     Tobacco Use   Smoking  Status Former Smoker     Quit date: 10/28/2007     Years since quittin.3   Smokeless Tobacco Former User     Quit date: 2004         Objective:     Vitals:    21 1229   BP: 152/78   Patient Site: Left Arm   Patient Position: Sitting   Cuff Size: Adult Regular   Pulse: 92   SpO2: 93%   Weight: 141 lb 14.4 oz (64.4 kg)   Height: 5' (1.524 m)       Exam:    General: No apparent distress. Calm. Alert and Oriented X3. Pt behavior is appropriate.  Chest/Lungs: Normal chest wall, clear to auscultation, normal respiratory effort and rate.  Trace edema bilateral lower extremities      Patient Active Problem List   Diagnosis     Leg pain, left     Femur fracture (H)     Hypertension     Pulmonary emphysema, unspecified emphysema type (H)     Hyperlipidemia     Bladder incontinence     Cardiomyopathy, idiopathic (H)     Depression with anxiety     Family history of blood disease     GERD (gastroesophageal reflux disease)     Post menopausal syndrome     OP (osteoporosis)     Osteoarthritis of hip     Insomnia     Dyspnea     Malignant neoplasm of central portion of left female breast (H)     Pleural nodules     Hot flashes due to tamoxifen     COPD exacerbation (H)     Herpes zoster without complication     Chest wall pain     Benign essential hypertension     Dyslipidemia     Acute respiratory failure with hypoxia (H)     Factor 5 Leiden mutation, heterozygous (H)     CKD (chronic kidney disease) stage 3, GFR 30-59 ml/min     Centrilobular emphysema (H)     Anisocoria     Herniated intervertebral disc     Hot flashes, menopausal     OAB (overactive bladder)     Physical deconditioning     Pulsatile tinnitus of both ears     Chest pain     Acute hypoxemic respiratory failure (H)     S/P hip replacement, left     Hip pain, left     Hypoxia     Low back pain     Compression fracture of L1 vertebra, sequela     Chronic respiratory failure with hypoxia (H)     Chronic obstructive pulmonary disease, unspecified COPD  type (H)     Chronic systolic congestive heart failure (H)     Metabolic encephalopathy     T12 compression fracture (H)     Acute on chronic diastolic congestive heart failure (H)     Heart failure exacerbated by sotalol (H)     Pain due to fracture     High risk medication use     Acute systolic congestive heart failure (H)     Factor V Leiden mutation (H)     Chronic anticoagulation     Essential hypertension, benign     Acute cystitis without hematuria     Exacerbation of intermittent asthma, unspecified asthma severity     Current Outpatient Medications   Medication Sig Dispense Refill     acetaminophen (TYLENOL) 325 MG tablet Take 650 mg by mouth 4 (four) times a day.       ADVAIR DISKUS 500-50 mcg/dose DISKUS USE 1 INHALATION TWICE A DAY 60 each 11     albuterol (PROAIR HFA;PROVENTIL HFA;VENTOLIN HFA) 90 mcg/actuation inhaler Inhale 2 puffs every 4 (four) hours as needed for wheezing. 1 Inhaler 0     ascorbic acid, vitamin C, (VITAMIN C) 1000 MG tablet Take 1,000 mg by mouth daily. Airborne 1 tab daily       azelastine (ASTELIN) 137 mcg (0.1 %) nasal spray 2 sprays into each nostril daily.       benzonatate (TESSALON) 200 MG capsule Take 1 capsule (200 mg total) by mouth 3 (three) times a day as needed for cough. 90 capsule 0     chlorhexidine (PERIDEX) 0.12 % solution Apply 15 mL to the mouth or throat at bedtime as needed.        cholecalciferol, vitamin D3, 1,000 unit tablet Take 1,000 Units by mouth daily.       diclofenac sodium (VOLTAREN) 1 % Gel Apply topically 4 (four) times a day as needed.       EPINEPHrine (EPIPEN/ADRENACLICK/AUVI-Q) 0.3 mg/0.3 mL injection Inject 0.3 mg into the shoulder, thigh, or buttocks.       famotidine (PEPCID) 20 MG tablet Take 20 mg by mouth daily.       fluticasone (FLONASE) 50 mcg/actuation nasal spray Apply 1 spray into each nostril as needed.        furosemide (LASIX) 40 MG tablet Take 1 tablet (40 mg total) by mouth 2 (two) times a day. 180 tablet 1     gabapentin  (NEURONTIN) 300 MG capsule Take 2 capsules (600 mg total) by mouth 2 (two) times a day. 360 capsule 2     ipratropium (ATROVENT HFA) 17 mcg/actuation inhaler Inhale 2 puffs every 6 (six) hours. 3 Inhaler 3     ipratropium-albuterol (DUO-NEB) 0.5-2.5 mg/3 mL nebulizer 1 neb 4 times a day until seen by primary and then as needed or as directed 60 vial 1     lidocaine (XYLOCAINE) 5 % ointment Apply topically 4 (four) times a day as needed.       losartan (COZAAR) 50 MG tablet TAKE 1 TABLET DAILY 90 tablet 3     melatonin 3 mg Tab tablet Take 1 tablet (3 mg total) by mouth at bedtime.  0     metoprolol succinate (TOPROL-XL) 50 MG 24 hr tablet TAKE ONE AND ONE-HALF TABLETS DAILY 135 tablet 3     mirabegron (MYRBETRIQ) 50 mg Tb24 ER tablet Take 1 tablet (50 mg total) by mouth daily. 90 tablet 3     omalizumab (XOLAIR) 150 mg/mL Syrg injection Inject 300 mg under the skin every 14 (fourteen) days.        oxyCODONE-acetaminophen (PERCOCET/ENDOCET) 5-325 mg per tablet TK 1 T PO Q 12 PRN       pantoprazole (PROTONIX) 40 MG tablet Take 1 tablet (40 mg total) by mouth daily. 90 tablet 4     polyethylene glycol (MIRALAX) 17 gram packet Take 1 packet (17 g total) by mouth daily as needed.  0     rivaroxaban ANTICOAGULANT (XARELTO) 20 mg tablet Take 1 tablet (20 mg total) by mouth at bedtime. 90 tablet 3     simvastatin (ZOCOR) 40 MG tablet Take 1 tablet (40 mg total) by mouth at bedtime. 90 tablet 3     sodium chloride (OCEAN) 0.65 % nasal spray 2 sprays into each nostril as needed.       solifenacin (VESICARE) 5 MG tablet Take 1 tablet (5 mg total) by mouth at bedtime. 90 tablet 3     tamoxifen (NOLVADEX) 20 MG tablet TAKE 1 TABLET DAILY 90 tablet 4     traZODone (DESYREL) 50 MG tablet TAKE 1 TABLET(50 MG) BY MOUTH AT BEDTIME AS NEEDED 90 tablet 2     venlafaxine (EFFEXOR-XR) 150 MG 24 hr capsule TAKE 1 CAPSULE DAILY 90 capsule 3     azithromycin (ZITHROMAX) 250 MG tablet Take 1 tablet (250 mg total) by mouth daily. 90 tablet 0      No current facility-administered medications for this visit.        I spent 33 minutes with patient face to face, of which >50% was counseling regarding the above plan       Aakash Flores CNP (Rob)    3/2/2021

## 2021-06-15 NOTE — TELEPHONE ENCOUNTER
Stephanie was contacted this morning to talk about symptoms and follow up after her infusion last week. She reports her weight is still down at 141#, and her edema in her feet is much improved. Her cough is much better as well as her breathing, She is feeling really good and is following a low sodium diet. She will call if any changes. AB/RN

## 2021-06-15 NOTE — TELEPHONE ENCOUNTER
Will plan on calling Stephanie tomorrow to assess her symptoms. She will drink 60 oz fluid today and taker her Lasix as scheduled. If she experiences any worsening symptoms before then or chest pressure she will call me or go to ED. AB/RN

## 2021-06-15 NOTE — TELEPHONE ENCOUNTER
Stephanie is contacted with recommendations to increase her Lasix dose to 80mg in the AM and 40mg in the PM x 3 days then return to 40mg two times a day on Sunday. Advised that the HF nurse will return call to her on Monday to discuss her response to this increase. sk/RN

## 2021-06-15 NOTE — TELEPHONE ENCOUNTER
Patient should direct any questions regarding her hospitalization to the current hospitalist team that is caring for her now.    Patient will be scheduling a post hospital hospital follow-up visit with me.    Help patient arrange rescheduling her second Covid shot.

## 2021-06-15 NOTE — TELEPHONE ENCOUNTER
Let's wait and see if she has any changes today and call her tomorrow for an update. Does she feel she needs to be in hospital?

## 2021-06-15 NOTE — TELEPHONE ENCOUNTER
----- Message from Clau Rosales sent at 2/3/2021  2:49 PM CST -----  Regarding: Sharon- Call back  Please call patient in regards to symptoms. Patient has been coughing and has some swelling. Has gained some weight. Please call [phone]

## 2021-06-15 NOTE — TELEPHONE ENCOUNTER
----- Message from Samantha Herrera CNP sent at 2/11/2021  9:03 AM CST -----  Please inform pt of lab results. Kidney function slightly decreased. How is she feeling after her infusion yesterday? Any improvement in weight or edema? Thank you

## 2021-06-15 NOTE — TELEPHONE ENCOUNTER
Reason for Call:  Admitted [facility]      Detailed comments: Patient wanting to noify PCP she has been admitted to [facility]. She was scheduled for her COVID shot this morning though we canceled the appt as she won't be able to make it. She is requesting to speak with [facility]'s care team re: her admission.     Phone Number Patient can be reached at: Cell number on file:    Telephone Information:   Mobile [phone]     Best Time: anytime     Can we leave a detailed message on this number?: Yes    Call taken on 2/22/2021 at 8:12 AM by Silvia Lloyd

## 2021-06-15 NOTE — PATIENT INSTRUCTIONS - HE
Follow-up with Samantha heart failure nurse practitioner as scheduled.    Continue with Lasix 40 mg twice daily.    Recheck kidney labs and electrolytes today.    Follow-up with pulmonologist as scheduled.  For now, I think it is reasonable to restart your daily azithromycin treatment.  You can revisit this at your follow-up pulmonology appointment.    We will arrange for your first COVID-19 vaccination today.  We will help your  get the shot as well.    Continue monitoring weights daily.    Do not use ibuprofen for pain.  Tylenol instead.    Follow-up with Dr. Pankaj Montanez sometime within the next 1 to 3 months

## 2021-06-15 NOTE — PROGRESS NOTES
VASCULAR SURGERY OUTPATIENT CONSULT OR VISIT   VASCULAR SURGEON: Dr. Jenny MD    LOCATION:  [facility]    Irene León   Medical Record #:  [id number]  YOB: 1945  Age:  76 y.o.     Date of Service: 2/8/2021    PRIMARY CARE PROVIDER: Pankaj Montanez MD      Reason for visit: Follow-up PAD    IMPRESSION: Claudication range ABIs on the left however patient completely asymptomatic and limited in walking by her severe COPD.  No foot pain or wounds.  She is medically optimized on a baby aspirin, statin, and is on Xarelto for A. fib.    RECOMMENDATION:      Continue optimal medical management.  Discussed that intervention would only be done if patient was symptomatic, specifically if she developed rest pain or nonhealing wounds.    She verbalized understanding and will follow up as needed.    HPI:  Irene León is a 76 y.o. female who was seen today in clinic today for in regards to her PAD.  Her when she was hospitalized a few months ago and it was noted that her DP and PT pulses on the left were nonpalpable.  She denied any foot pain does not ambulate much secondary to her significant COPD.  ABIs showed claudication range SFA occlusion repeat ABIs done today show that this is stable.  She denies any other new health concerns few months since we saw her.  She is compliant with her aspirin, statin, and Xarelto    PHH:    Past Medical History:   Diagnosis Date     Arrhythmia      Breast cancer (H) 2017     CHF (congestive heart failure) (H)      COPD (chronic obstructive pulmonary disease) (H)      Coronary artery disease      GERD (gastroesophageal reflux disease)      Hx of radiation therapy 2017     Hyperlipidemia      Hypertension      Idiopathic cardiomyopathy (H)         Past Surgical History:   Procedure Laterality Date     BREAST BIOPSY Right 2017     BREAST LUMPECTOMY Left 06/2017    x2     CARDIAC CATHETERIZATION       CATARACT EXTRACTION Left 07/18/2017     [place] OPEN TX  FEMORAL FRACTURE DISTAL MED/LAT CONDYLE Left 10/28/2015    Procedure: OPEN REDUCTION INTERNAL FIXATION LEFT  PROXIMAL FEMUR PERIPROSTHETIC FRACTURE;  Surgeon: Yovanny Albarran MD;  Location: [facility] OR;  Service: Orthopedics     REVISION TOTAL HIP ARTHROPLASTY Left        ALLERGIES:  Sulfa (sulfonamide antibiotics), Homeopathic drugs, Mold extracts, Morphine (pf), Lisinopril, and Sulfacetamide sodium    MEDS:    Current Outpatient Medications:      acetaminophen (TYLENOL) 325 MG tablet, Take 2 tablets (650 mg total) by mouth 3 (three) times a day. (Patient taking differently: Take 650 mg by mouth 4 (four) times a day. ), Disp:  , Rfl: 0     ADVAIR DISKUS 500-50 mcg/dose DISKUS, USE 1 INHALATION TWICE A DAY, Disp: 60 each, Rfl: 11     albuterol (PROAIR HFA;PROVENTIL HFA;VENTOLIN HFA) 90 mcg/actuation inhaler, Inhale 2 puffs every 4 (four) hours as needed for wheezing., Disp: 1 Inhaler, Rfl: 0     ascorbic acid, vitamin C, (VITAMIN C) 1000 MG tablet, Take 1,000 mg by mouth daily. Airborne 1 tab daily, Disp: , Rfl:      azelastine (ASTELIN) 137 mcg (0.1 %) nasal spray, 2 sprays into each nostril daily., Disp: , Rfl:      benzonatate (TESSALON) 200 MG capsule, Take 1 capsule (200 mg total) by mouth 3 (three) times a day as needed for cough., Disp: 90 capsule, Rfl: 2     chlorhexidine (PERIDEX) 0.12 % solution, Apply 15 mL to the mouth or throat at bedtime as needed. , Disp: , Rfl:      cholecalciferol, vitamin D3, 1,000 unit tablet, Take 1,000 Units by mouth daily., Disp: , Rfl:      diclofenac sodium (VOLTAREN) 1 % Gel, Apply 4 g to area of pain in low back 4 times daily, Disp:  , Rfl: 0     EPINEPHrine (EPIPEN/ADRENACLICK/AUVI-Q) 0.3 mg/0.3 mL injection, Inject 0.3 mg into the shoulder, thigh, or buttocks., Disp: , Rfl:      famotidine (PEPCID) 20 MG tablet, Take 20 mg by mouth daily., Disp: , Rfl:      fluticasone (FLONASE) 50 mcg/actuation nasal spray, Apply 1 spray into each nostril as needed. , Disp: ,  Rfl:      furosemide (LASIX) 40 MG tablet, Take 1 tablet (40 mg total) by mouth 2 (two) times a day., Disp: 180 tablet, Rfl: 1     gabapentin (NEURONTIN) 300 MG capsule, Take 2 capsules (600 mg total) by mouth 2 (two) times a day., Disp: 360 capsule, Rfl: 2     ipratropium (ATROVENT HFA) 17 mcg/actuation inhaler, Inhale 2 puffs every 6 (six) hours., Disp: 3 Inhaler, Rfl: 3     ipratropium-albuterol (DUO-NEB) 0.5-2.5 mg/3 mL nebulizer, 1 neb 4 times a day until seen by primary and then as needed or as directed, Disp: 60 vial, Rfl: 1     lidocaine (XYLOCAINE) 5 % ointment, Apply to area of pain in low back 4 times daily, Disp: 35.44 g, Rfl: 0     losartan (COZAAR) 50 MG tablet, TAKE 1 TABLET DAILY, Disp: 90 tablet, Rfl: 3     melatonin 3 mg Tab tablet, Take 1 tablet (3 mg total) by mouth at bedtime., Disp:  , Rfl: 0     metoprolol succinate (TOPROL-XL) 50 MG 24 hr tablet, TAKE ONE AND ONE-HALF TABLETS DAILY, Disp: 135 tablet, Rfl: 3     mirabegron (MYRBETRIQ) 50 mg Tb24 ER tablet, Take 1 tablet (50 mg total) by mouth daily., Disp: 90 tablet, Rfl: 3     omalizumab (XOLAIR) 150 mg/mL Syrg injection, Inject 300 mg under the skin every 14 (fourteen) days. , Disp: , Rfl:      oxyCODONE-acetaminophen (PERCOCET/ENDOCET) 5-325 mg per tablet, TK 1 T PO Q 12 PRN, Disp: , Rfl:      pantoprazole (PROTONIX) 40 MG tablet, Take 1 tablet (40 mg total) by mouth daily., Disp: 90 tablet, Rfl: 4     polyethylene glycol (MIRALAX) 17 gram packet, Take 1 packet (17 g total) by mouth daily as needed., Disp: , Rfl: 0     rivaroxaban ANTICOAGULANT (XARELTO) 20 mg tablet, Take 1 tablet (20 mg total) by mouth at bedtime., Disp: 90 tablet, Rfl: 3     simvastatin (ZOCOR) 40 MG tablet, Take 1 tablet (40 mg total) by mouth at bedtime., Disp: 90 tablet, Rfl: 3     sodium chloride (OCEAN) 0.65 % nasal spray, 2 sprays into each nostril as needed., Disp: , Rfl:      solifenacin (VESICARE) 5 MG tablet, Take 1 tablet (5 mg total) by mouth at bedtime.,  Disp: 90 tablet, Rfl: 3     tamoxifen (NOLVADEX) 20 MG tablet, TAKE 1 TABLET DAILY, Disp: 90 tablet, Rfl: 4     traZODone (DESYREL) 50 MG tablet, TAKE 1 TABLET(50 MG) BY MOUTH AT BEDTIME AS NEEDED, Disp: 90 tablet, Rfl: 2     venlafaxine (EFFEXOR-XR) 150 MG 24 hr capsule, TAKE 1 CAPSULE DAILY, Disp: 90 capsule, Rfl: 3    SOCIAL HABITS:    Social History     Tobacco Use   Smoking Status Former Smoker     Quit date: 10/28/2007     Years since quittin.2   Smokeless Tobacco Former User     Quit date: 2004       Social History     Substance and Sexual Activity   Alcohol Use No       Social History     Substance and Sexual Activity   Drug Use No       FAMILY HISTORY:    Family History   Problem Relation Age of Onset     Osteoporosis Other      Prostate cancer Brother      Breast cancer Maternal Aunt         age thought to be in her 70's-80's     Prostate cancer Maternal Uncle        REVIEW OF SYSTEMS:    A 12 point ROS was reviewed and except for what is listed in the HPI above, all others are negative    PE:  /70   Pulse 66   Resp 16   LMP 1992   Wt Readings from Last 1 Encounters:   21 138 lb 11.2 oz (62.9 kg)     There is no height or weight on file to calculate BMI.    EXAM:  GENERAL: This is a well-developed 76 y.o. female who appears her stated age  EYES: Grossly normal.  MOUTH: Buccal mucosa normal   CHEST/LUNG:  Normal respiratory effort  GASTROINTESINAL (ABDOMEN):Not Assessed   MUSCULOSKELETAL: Grossly normal and both lower extremities are intact.  HEME/LYMPH: +2 edema, chronic and patient states has been stable.  NEUROLOGIC: Focally intact, Alert and oriented x 3.   PSYCH: appropriate affect  INTEGUMENT: No open lesions or ulcers, bilateral lower extremities are warm and appear well-perfused unable to palpate a left DP or PT pulse.    DIAGNOSTIC STUDIES:     I personally reviewed the images and my interpretation is right ABIs are normal, left RONY at 1.77 which is abnormal however  toe pressures remain at 97 which is adequate for wound healing.  Duplex indicates SFA occlusion with distal reconstitution.    LABS:      Sodium   Date Value Ref Range Status   01/21/2021 140 136 - 145 mmol/L Final   01/04/2021 143 136 - 145 mmol/L Final   12/15/2020 142 136 - 145 mmol/L Final     Potassium   Date Value Ref Range Status   01/21/2021 3.7 3.5 - 5.0 mmol/L Final   01/04/2021 3.5 3.5 - 5.0 mmol/L Final   12/15/2020 4.9 3.5 - 5.0 mmol/L Final     Chloride   Date Value Ref Range Status   01/21/2021 103 98 - 107 mmol/L Final   01/04/2021 103 98 - 107 mmol/L Final   12/15/2020 102 98 - 107 mmol/L Final     BUN   Date Value Ref Range Status   01/21/2021 18 8 - 28 mg/dL Final   01/04/2021 44 (H) 8 - 28 mg/dL Final   12/15/2020 17 8 - 28 mg/dL Final     Creatinine   Date Value Ref Range Status   01/21/2021 1.35 (H) 0.60 - 1.10 mg/dL Final   01/04/2021 1.54 (H) 0.60 - 1.10 mg/dL Final   12/15/2020 1.49 (H) 0.60 - 1.10 mg/dL Final     Hemoglobin   Date Value Ref Range Status   01/21/2021 16.1 (H) 12.0 - 16.0 g/dL Final   10/26/2020 14.5 12.0 - 16.0 g/dL Final   10/18/2020 13.3 12.0 - 16.0 g/dL Final     Platelets   Date Value Ref Range Status   01/21/2021 240 140 - 440 thou/uL Final   10/26/2020 185 140 - 440 thou/uL Final   10/18/2020 166 140 - 440 thou/uL Final     BNP   Date Value Ref Range Status   01/04/2021 192 (H) 0 - 137 pg/mL Final   10/20/2020 396 (H) 0 - 137 pg/mL Final   10/19/2020 2,067 (H) 0 - 137 pg/mL Final     INR   Date Value Ref Range Status   11/13/2017 3.33 (H) 0.90 - 1.10 Final   11/06/2017 2.87 (H) 0.90 - 1.10 Final   10/23/2017 2.12 (H) 0.90 - 1.10 Final       Total time spent 25 minutes face to face with patient with more than 50% time spent in counseling and coordination of care.    Ramone Glez, NIKITA  VASCULAR SURGERY

## 2021-06-15 NOTE — TELEPHONE ENCOUNTER
Stephanie is contacted today to discuss her response to the increased dose of the Lasix 80mg in the AM and 40mg in the PM x 3 days.   She reports today that she has not seen measurable improvement. Her weight this morning was 142 #. She continues to have significant swelling in both of her feet, ankles, and lower legs.    She has been elevating her legs in the recliner with two pillows under her legs most of the day/night. She sleeps in the recliner. She has a loose congested cough which is at times productive of thin white to creamy sputum. She denies chest pain, light headedness or dizziness.   She will be seen in [facility] today for a bilateral LE US study. This appears to have been ordered by Dr. Rosenthal ( Vascular provider) during an inpatient stay in October of last year for evaluation of PAD.  Samantha Herrera CNP what are your thoughts going forward? sk/RN

## 2021-06-15 NOTE — TELEPHONE ENCOUNTER
Call placed to Stephanie today, and she reports feeling much better today. She has lost 2 lbs since yesterday (141#), her feet are less swollen, but she still has a productive cough. She denies any fever or any other symptoms. Low sodium diet options were reinforced. She asks if she should increase her diuretic to increase her results, and what she can take for her cough. Will follow-up with recommendations from provider. AB/RN

## 2021-06-15 NOTE — TELEPHONE ENCOUNTER
Results reviewed by Samantha Herrera NP and called to patient via phone. Stephanie states she has not noticed much of a change after her IV Lasix yesterday. She has not noticed an increase in her urination, and she has gained 1 lb. since yesterday (wts are 2/8 138#, 2/9 140#, 2/10 142#, and 2/11 143#). Stephanie describes swelling in her ankles and a wet, productive cough. She was borderline hypotensive at the IV infusion clinic yesterday (90/68). Soledad Mccauley RN

## 2021-06-15 NOTE — PROGRESS NOTES
[facility] Hematology and Oncology Progress Note    Patient: Irene León  MRN: [id number]  Date of Service: 1/11/2018        Reason for Visit    Chief Complaint   Patient presents with     HE Cancer     Breast Cancer       Assessment and Plan  Malignant neoplasm of central portion of left female breast    Staging form: Breast, AJCC 7th Edition    - Pathologic stage from 7/26/2017: Stage IA (T1b, N0, cM0) - Signed by Devon Suarez MD on 8/1/2017    ECOG Performance   ECOG Performance Status: 1     Distress Assessment  Distress Assessment Score: 4    Pain  Currently in Pain: No/denies    # Stage IA (pT1b, pN0 (sn),cM0) invasive ductal carcinoma of the left breast, grade 1, associated DCIS, ER/TN positive, HER-2 negative, right breast atypical ductal hyperplasia.  S/P right breast lumpectomy, left breast lumpectomy and left axillary sentinel lymph node biopsy on 6/20/2017.  She has several comorbidities including nonischemic cardiomyopathy (EF 40% in 3/17), osteoporosis on treatment with oral bisphosphonate, factor V Leiden mutation (details unknown).  #.  Osteoporosis  #.  COPD  #.  Idiopathic cardiomyopathy-improving cardiac function.     She has some side effects from tamoxifen with hot flashes, night sweats, fatigue but reported she is feeling okay to continue tamoxifen at this point.  No clinical evidence of breast cancer recurrence.     She will continue aspirin 81 mg daily.   She will have a mammogram in May 2018.   Follow-up with me or Mahendra for clinical exam in 6 months.     Problem List    1. Malignant neoplasm of central portion of left breast in female, estrogen receptor positive  Mammo Diagnostic Bilateral      ______________________________________________________________________________    Diagnosis  6/20/17  Stage IA (pT1b, pN0(sn), cM0) invasive ductal carcinoma of the left breast  - ER (high positive, 98%), TN (high positive, 97%) HER2 (negative, score of 1+ by IHC)  - [place] grade 1  -  Tumor size: 2.3 cm  - Margin-negative on final margin with reexcision for invasive carcinoma but close margin for DCIS of 0.2 cm from new medial margin.    - Angiolymphatic invasion present at the inferior and original medial margin.  - 1 sentinel lymph node removed, negative for carcinoma  - associated DCIS  - Right breast atypical ductal hyperplasia.    - Oncotype DX recurrence score 6 : 10 year risk of recurrence with 5 year of tamoxifen is 5%.    Therapy to date:  6/20/17 -right breast lumpectomy AND left breast lumpectomy, left axillary sentinel lymph node biopsy  6/30/17-reexcision lumpectomy of the left breast  9/8/17-completed adjuvant radiation to the left breast 4256 cGy/16  12/6/17-initiated adjuvant endocrine therapy with tamoxifen.    History of Present Illness    Stephanie is here by herself today.      She reported ongoing shortness of breath with exertion.  She has not hypoxic.  She has been closely follow with pulmonary.  She has poor appetite.  She has intermittent hot flashes, night sweats and fatigue.  Otherwise she is tolerating tamoxifen well.  She denies any mood symptoms.  She denies leg swelling or pain.  No chest pain.  No new bone pain.    Pain Status  Currently in Pain: No/denies    Review of Systems    Constitutional  Constitutional (WDL): Exceptions to WDL  Fatigue: Fatigue relieved by rest  Fever: None  Chills: None  Weight Gain: None  Weight Loss: None  Neurosensory  Neurosensory (WDL): All neurosensory elements are within defined limits  Eye   Eye Disorder (WDL): All eye disorder elements are within defined limits  Ear  Ear Disorder (WDL): All ear disorder elements are within defined limits  Cardiovascular  Cardiovascular (WDL): All cardiovascular elements are within defined limits  Pulmonary  Respiratory (WDL): Exceptions to WDL  Cough: None  Dyspnea: Shortness of breath with moderate exertion  Hypoxia: None  Gastrointestinal  Gastrointestinal (WDL): Exceptions to WDL  Anorexia: Loss of  appetite without alteration in eating habits  Constipation: None  Diarrhea: None  Dysphagia: None  Esophagitis: None  Nausea: None  Pharyngitis: None  Vomiting: None  Dysgeusia: None  Dry Mouth: Symptomatic (e.g., dry or thick saliva) without significant dietary alteration, unstimulated saliva flow >0.2 ml/min (due to advair)  Genitourinary  Genitourinary (WDL): All genitourinary elements are within defined limits  Lymphatic  Lymph (WDL): All lymph disorder elements are within defined limits  Musculoskeletal and Connective Tissue  Musculoskeletal and Connetive Tissue Disorders (WDL): All Musculoskeletal and Connetive Tissue Disorder elements are within defined limits  Integumentary  Integumentary (WDL): All integumentary elements are within defined limits  Patient Coping  Patient Coping: Open/discussion  Distress Assessment  Distress Assessment Score: 4  Accompanied by  Accompanied by: Alone  Oral Chemo Adherence       Past History  Past Medical History:   Diagnosis Date     Arrhythmia      Breast cancer      CHF (congestive heart failure)      COPD (chronic obstructive pulmonary disease)      Coronary artery disease      GERD (gastroesophageal reflux disease)      Hyperlipidemia      Hypertension      Idiopathic cardiomyopathy        Physical Exam    Recent Vitals 1/10/2018   Height -   Weight 144 lbs 11 oz   BSA (m2) -   /61   Pulse 60   Temp 97.7   Temp src 1   SpO2 93   Some recent data might be hidden     General: alert, awake, not in acute distress  HEENT: Head: Normal, normocephalic, atraumatic.  Eye: Normal external eye, conjunctiva, lids cornea, CATINA.  Ears: Non-tender.  Nose: Normal external nose, mucus membranes and septum.  Pharynx: Dental Hygiene adequate. Normal buccal mucosa. Normal pharynx.  Neck / Thyroid: Supple, no masses, nodes, nodules or enlargement.  Lymphatics: No abnormally enlarged lymph nodes.  Chest: Normal chest wall and respirations. Clear to auscultation.  Breasts: No palpable  breast masses.  No axillary lymphadenopathy.    Heart: S1 S2 RRR, no murmur.   Abdomen: abdomen is soft without significant tenderness, masses, organomegaly or guarding  Extremities: normal strength, tone, and muscle mass  Skin: normal. no rash or abnormalities  CNS: non focal.      Lab Results    No results found for this or any previous visit (from the past 168 hour(s)).    Imaging    No results found.      Signed by: Devon Suarez MD

## 2021-06-15 NOTE — TELEPHONE ENCOUNTER
Requesting new verbal order for start of care assessment to be completed on 3/4/21due to patient request     This is outside of the original 48 hour referral, requiring a new verbal order.     Disciplines ordered: , JACINTA    Thank you

## 2021-06-15 NOTE — TELEPHONE ENCOUNTER
RN cannot approve Refill Request    RN can NOT refill this medication med is not covered by policy/route to provider. Last office visit: 3/2/2021 Aakash Flores CNP Last Physical: 1/19/2021 Last MTM visit: Visit date not found Last visit same specialty: 3/2/2021 Aakash Flores CNP.  Next visit within 3 mo: Visit date not found  Next physical within 3 mo: Visit date not found      Evy Fritz, [facility] Triage/Med Refill 3/3/2021    Requested Prescriptions   Pending Prescriptions Disp Refills     MYRBETRIQ 50 mg Tb24 ER tablet [Pharmacy Med Name: MYRBETRIQ TABS 50MG] 90 tablet 3     Sig: TAKE 1 TABLET DAILY       There is no refill protocol information for this order

## 2021-06-16 NOTE — PATIENT INSTRUCTIONS - HE
Instructions on how to use your new inhaler (Yanyelliot Ellipta):  Oral inhalation: Dry powder inhaler: For oral inhalation only; administer at the same time each day. Discard device 6 weeks after it is removed from the foil tray or when the dose counter reads  0  (whichever comes first). Do not open the cover of the inhaler until ready for use; each time cover is opened, 1 dose of medicine is prepared. Exhale fully before taking one long, steady, deep breath through the mouthpiece (do not breathe through nose); hold breath for 3 to 4 seconds and exhale slowly and gently. Following administration, rinse mouth with water after use (do not swallow). If mouthpiece needs cleaning, wipe with a dry tissue.    Stop using your Advair.    I sent in a short prescription for tramadol into your pharmacy.  This is to help with your severe left-sided rib pain.  Alternate this with Tylenol.  You cannot use ibuprofen.    Use your incentive spirometer every hour.  This will help to stave off pneumonia.    No changes to your diuretic (furosemide) at this point.  Continue on 40 mg twice daily.    Make sure that you charge your portable oxygen machine.    You should be wearing oxygen 24/7.  2 L at rest, 4 L with activity.    Wear your lidocaine patch on your ribs.  Use heat.    You cannot afford to fall again.  Move and change positions cautiously.    You should follow-up with Dr. Darling for routine checkup in 3 months.    Follow-up with your pulmonologist in 2 weeks as scheduled to check on breathing.    If you feel like your pain or breathing worsens over the next couple of weeks, proceed to ED for urgent evaluation

## 2021-06-16 NOTE — PROGRESS NOTES
"  [facility]      NAME:  Irene León             :  1945    MRN: [id number]    CODE STATUS:  FULL CODE    FACILITY: [facility] [[id number]]         CHIEF COMPLAIN/REASON FOR VISIT:  Chief Complaint   Patient presents with     Problem Visit     copd       HISTORY OF PRESENT ILLNESS: Irene León is a 76 y.o. female being seen at the request of the nurses request to review CXR. Had increased shortness of breath yesterday and had xray with some nebs ordered. Nebs not initiated as facility specific covid restrictions. Reviewed with NM who will follow-up with DON to see if nebs can start. CXR w/o pneumonia or CHF, but says does not ro COPD, pt with PMH of this. At [facility] from 3/23 to 3/30/2021 and per her EMR \"with PMH of COPD (on 2L at home) with two recent admissions at [facility], CHF (EF 40%), factor 5 Leiden on anticoagulation, CKD stage 3, breast cancer, depression and anxiety who presented to the ED 3/23 due to acute weakness and encephalopathy and found to have E. Coli pyelonephritis and bacteremia with severe sepsis:     Severe sepsis  Pyelonephritis   E. Coli bacteremia  Possible pneumonia  - improved with ceftriaxone; finished 7 day course     Chronic HFrEF with acute exacerbation.  - Echocardiogram this admit shows EF 45% w/elevated right atrial/ventricular pressures, near baseline per care everywhere (had similar EF on ECHO at outside hospital recently as well).  - on admission was hypovolemic and septic, but became hypervolemic following fluid resuscitation for sepsis  - Has diuresed well on IV lasix  - transition to orals 80/40 for discharge, which may need to be adjusted. Patient was on 80 daily at home, but per daughter this was not controlling her peripheral edema nor cough from pulmonary edema (daughter is RN) although patient wasn't that compliant.  - Toprol increased from 74 yo 100mg daily  - ARB on hold due to lower pressures (may need to resume after " discharge) Was on Losartan 50mg PTA     Acute kidney injury likely 2/2 sepsis  -resolved     Hypomag: stable today  Hypokalemia: repleted     Diarrhea: new, started a few days into hospitalization. C. Diff not sent per hospital protocol as no fever/abdominal pain and leukocytosis is improving; Most likely 2/2 antibiotics.Continue imodium; recommend c. Diff testing if diarrhea does not improve off of antibiotics.      Acute on Chronic hypoxemic respiratory failure due to COPD (on 2L at baseline) with pulmonary emphysema moderate to severe:  Has recurrent COPD exacerbation requiring hospitalization, most recently 3/9-3/11. COPD seemed stable here in hospital. Continued on inhalers. Weaned down to 2L of oxygen at discharge      L rib pain and T12 compression fracture  Since a fall 6 weeks ago, was on tramadol + lidocaine patches. Endorses ongoing severe pain. Oral dilaudid and lidocaine patch attempted in hospital after tramadol wasn't helping. However, this didn't seem to help wither.    D/w pain MD, Dr. Lima to get additional ideas to control her pain; he suggested abdominal binder and short course celebrex. Consider robaxin if this doesn't help.     Essential Hypertension:  - under control with metoprolol and diuresis  - hold ARB; resume if BP increases     Hyperlipidemia   -  statin     Overactive bladder  - Holding pta Myrbetriq ER + solifenacin (resume on discharge)      Depression and anxiety with insomnia:  - pta 150 mg venlafaxine daily - resume; d/w pharmacy to start at 75 for a few days then increase back to home dose  - pta 3 mg melatonin HS, 50 mg trazodone HS prn - dc for prolonged QTc     It looks like patient has chronically prolonged QTc on review of records;     Atrial fibrillation: Patient was on 20mg Xarelto PTA. I see one note re: afib in her outpatient records; she did have several episodes of afib with RVR here in the hospital. She thinks she is in on Xarelto for TIA with factor V leidin,  "which is not an indication. Given h/o PAF will continue Xarelto at reduced dose of 15mg. She is currently in sinus. I will order a event monitor for her on discharge to see if when not septic she truly has afib. Given her falls risk, would recommend dc Xarelto if no PAF or clear indication.      Resolved issues this admission:    Hypotension 2/2 sepsis    Acute encephalopathy 2/2 sepsis    Acute metabolic acidosis with tachypnea for compensation and no pulmonary reserve     Demand ischemia     Code Status: discussed on 3/27 with Stephanie; she wants to remain full code; d/w Leelee that I'm concerned and wish that she would accept more discussions re: advance directive/goals of care. Recommended Leelee approach Stephanie's PCP on this matter or refer for palliative care consult if this is available to her as outpatient. \"  Reports shortness of breath stable,  02 use as  PER HOME. She is feeling more shortness of breath but lungs clear. We will try to get nebs initiated for her copd as ordered      Allergies   Allergen Reactions     Sulfa (Sulfonamide Antibiotics) Rash     Headaches and dizziness.     Homeopathic Drugs Unknown and Other (See Comments)     Comment: runny nose, Comment: runny nose     Mold Extracts      Morphine (Pf)      hallucinate     Lisinopril Cough, Unknown and Itching     Comment: cough, Comment: cough     Sulfacetamide Sodium Rash   :     Current Outpatient Medications   Medication Sig     acetaminophen (TYLENOL) 500 MG tablet Take 1,000 mg by mouth 3 (three) times a day.     albuterol (PROAIR HFA;PROVENTIL HFA;VENTOLIN HFA) 90 mcg/actuation inhaler Inhale 2 puffs every 4 (four) hours as needed for wheezing.     ascorbic acid, vitamin C, (VITAMIN C) 1000 MG tablet Take 1,000 mg by mouth daily. Airborne 1 tab daily     azelastine (ASTELIN) 137 mcg (0.1 %) nasal spray 2 sprays into each nostril daily.     azithromycin (ZITHROMAX) 250 MG tablet Take 1 tablet (250 mg total) by mouth at bedtime.     " benzonatate (TESSALON) 200 MG capsule Take 1 capsule (200 mg total) by mouth 3 (three) times a day as needed for cough.     chlorhexidine (PERIDEX) 0.12 % solution Apply 15 mL to the mouth or throat at bedtime as needed.      cholecalciferol, vitamin D3, 1,000 unit tablet Take 1,000 Units by mouth daily.     diclofenac sodium (VOLTAREN) 1 % Gel Apply 1 g topically 3 (three) times a day.      EPINEPHrine (EPIPEN/ADRENACLICK/AUVI-Q) 0.3 mg/0.3 mL injection Inject 0.3 mg into the shoulder, thigh, or buttocks.     fluticasone (FLONASE) 50 mcg/actuation nasal spray Apply 1 spray into each nostril 2 (two) times a day as needed for allergies.      fluticasone-umeclidinium-vilanterol (TRELEGY ELLIPTA) 100-62.5-25 mcg DsDv inhaler Inhale 1 Inhalation daily.     furosemide (LASIX) 40 MG tablet Take 1 tablet (40 mg total) by mouth 2 (two) times a day. (Patient taking differently: Take 80 mg by mouth every morning. and 40mg at 2pm)     gabapentin (NEURONTIN) 300 MG capsule Take 2 capsules (600 mg total) by mouth 2 (two) times a day.     hydrOXYzine pamoate (VISTARIL) 25 MG capsule Take 25 mg by mouth 3 (three) times a day.      ipratropium (ATROVENT HFA) 17 mcg/actuation inhaler Inhale 2 puffs every 6 (six) hours.     loperamide (IMODIUM) 2 mg capsule Take 4 mg by mouth as needed for diarrhea (Every 3 hours as needed for Diarrhea).     melatonin 3 mg Tab tablet Take 1 tablet (3 mg total) by mouth at bedtime.     metoprolol succinate (TOPROL-XL) 50 MG 24 hr tablet Take 100 mg by mouth at bedtime.     MYRBETRIQ 50 mg Tb24 ER tablet TAKE 1 TABLET DAILY     pantoprazole (PROTONIX) 40 MG tablet Take 1 tablet (40 mg total) by mouth daily.     polyethylene glycol (MIRALAX) 17 gram packet Take 1 packet (17 g total) by mouth daily as needed.     rivaroxaban ANTICOAGULANT (XARELTO) 15 mg tablet Take 15 mg by mouth daily with supper.     simvastatin (ZOCOR) 40 MG tablet Take 1 tablet (40 mg total) by mouth at bedtime.     sodium chloride  (OCEAN) 0.65 % nasal spray 2 sprays into each nostril every 2 (two) hours as needed for congestion.      solifenacin (VESICARE) 5 MG tablet Take 1 tablet (5 mg total) by mouth at bedtime.     tamoxifen (NOLVADEX) 20 MG tablet TAKE 1 TABLET DAILY     traMADoL (ULTRAM) 50 mg tablet Take 1 tablet (50 mg total) by mouth every 6 (six) hours as needed for pain or severe pain (7-10).     venlafaxine (EFFEXOR-XR) 150 MG 24 hr capsule TAKE 1 CAPSULE DAILY         REVIEW OF SYSTEMS:    Currently, no fever, chills, or rigors. Does not have any visual or hearing problems. Denies any chest pain, headaches, palpitations, lightheadedness, dizziness, NO CHANGE IN LEVEL OF  shortness of breath, or cough. Appetite is good. Denies any GERD symptoms. Denies any difficulty with swallowing, nausea, or vomiting.  Denies any abdominal pain, diarrhea or constipation. Denies any urinary symptoms. No insomnia. No active bleeding. No rash.       PHYSICAL EXAMINATION:  Vitals:    04/11/21 1347   BP: 145/85   Pulse: (!) 58   Temp: 98.3  F (36.8  C)   Weight: 132 lb 9.6 oz (60.1 kg)         GENERAL: Awake, Alert, oriented x3, not in any form of acute distress, answers questions appropriately, follows simple commands, conversant  HEENT: Head is normocephalic with normal hair distribution. No evidence of trauma. Ears: No acute purulent discharge. Eyes: Conjunctivae pink with no scleral jaundice. Nose: Normal mucosa and septum. NECK: Supple with no cervical or supraclavicular lymphadenopathy. Trachea is midline.  LUNGS: Clear to auscultation  ABDOMEN: Globular and soft, nontender to palpation, non distended, no masses, no organomegaly, good bowel sounds, no rebound or guarding, no peritoneal signs.   EXTREMITIES: Atraumatic. Full range of motion on both upper and lower extremities, there is no tenderness to palpation, +  pedal edema, no cyanosis or clubbing, no calf tenderness, normal cap refill, no joint swelling.  SKIN: Warm and dry, no erythema  noted, no rashes or lesions.  Skin has some faded bruising noted possible old IV sites.  NEUROLOGICAL: The patient is oriented to person, place and time. Strength and sensation are grossly intact. Face is symmetric.        Social distancing practiced for assessment whenever possible as well as PPE utilized for visit today.            LABS:    Lab Results   Component Value Date    WBC 9.5 04/05/2021    HGB 9.8 (L) 04/05/2021    HCT 31.2 (L) 04/05/2021    MCV 89 04/05/2021     04/05/2021       Results for orders placed or performed in visit on 04/05/21   Basic Metabolic Panel   Result Value Ref Range    Sodium 141 136 - 145 mmol/L    Potassium 3.9 3.5 - 5.0 mmol/L    Chloride 104 98 - 107 mmol/L    CO2 27 22 - 31 mmol/L    Anion Gap, Calculation 10 5 - 18 mmol/L    Glucose 77 70 - 125 mg/dL    Calcium 8.5 8.5 - 10.5 mg/dL    BUN 17 8 - 28 mg/dL    Creatinine 1.32 (H) 0.60 - 1.10 mg/dL    GFR MDRD Af Amer 47 (L) >60 mL/min/1.73m2    GFR MDRD Non Af Amer 39 (L) >60 mL/min/1.73m2           No results found for: HGBA1C  Vitamin D, Total (25-Hydroxy)   Date Value Ref Range Status   10/15/2019 47.6 30.0 - 80.0 ng/mL Final     No results found for: [id number]    ASSESSMENT/PLAN:  1. COPD exacerbation (H)    2. Physical deconditioning      1. COPD/  Pulmonary emphysema: Is in a deconditioned state status post hospitalization on chronic oxygen  Will have PT and OT have services as well as skilled nurses to manage medical conditions and monitor oxygen saturations for oxygen.  Get approval for nebulizers vs MDI    2. Physical deconditioned: Continues with PT/OT with energy saving techniques, safety and strengthening, SN services for chronic medical conditons managment            Electronically signed by:  Monet Byrd CNP  This progress note was completed using Dragon software and there may be grammatical errors.

## 2021-06-16 NOTE — PROGRESS NOTES
Post Discharge Medication Reconciliation Status: discharge medications reconciled and changed, per note/orders      Assessment & Plan     Chronic obstructive pulmonary disease COPD type (H)  She has had multiple exacerbations requiring hospital admission.  Plan is to switch her controller inhaler to Trelegy Ellipta and have her follow-up with her pulmonologist at the end of this month.  Stop Advair.  Importance of continuous oxygen therapy reiterated today.  She was told to use her incentive spirometer at least every hour while awake.  - fluticasone-umeclidinium-vilanterol (TRELEGY ELLIPTA) 100-62.5-25 mcg DsDv inhaler  Dispense: 1 each; Refill: 3    Acute on chronic diastolic congestive heart failure (H)  She appears well compensated today.  Blood pressure mildly elevated at 150/70 but she did not take her morning dose of Lasix in anticipation of this appointment.  Has follow-up with cardiology next month.  BNP in the mid 200s while hospitalized last week.  Trace edema bilateral lower extremities    Rib pain on left side  Severe.  Finished up her oxycodone prescription from the hospital.  Is having a hard time taking deep breaths.  We had a long conversation about the risks associated with opioids and she elected to proceed with tramadol while her chest wall heals.  Continue with heat.  Continue with lidocaine patches.  Alternate tramadol with Tylenol.  - traMADoL (ULTRAM) 50 mg tablet  Dispense: 30 tablet; Refill: 0    Essential hypertension  Mildly elevated today but she did not take her morning dose of Lasix.    Patient Instructions   I  nstructions on how to use your new inhaler (Trelegy Ellipta):  Oral inhalation: Dry powder inhaler: For oral inhalation only; administer at the same time each day. Discard device 6 weeks after it is removed from the foil tray or when the dose counter reads  0  (whichever comes first). Do not open the cover of the inhaler until ready for use; each time cover is opened, 1 dose of  medicine is prepared. Exhale fully before taking one long, steady, deep breath through the mouthpiece (do not breathe through nose); hold breath for 3 to 4 seconds and exhale slowly and gently. Following administration, rinse mouth with water after use (do not swallow). If mouthpiece needs cleaning, wipe with a dry tissue.    Stop using your Advair.    I sent in a short prescription for tramadol into your pharmacy.  This is to help with your severe left-sided rib pain.  Alternate this with Tylenol.  You cannot use ibuprofen.    Use your incentive spirometer every hour.  This will help to stave off pneumonia.    No changes to your diuretic (furosemide) at this point.  Continue on 40 mg twice daily.    Make sure that you charge your portable oxygen machine.    You should be wearing oxygen 24/7.  2 L at rest, 4 L with activity.    Wear your lidocaine patch on your ribs.  Use heat.    You cannot afford to fall again.  Move and change positions cautiously.    You should follow-up with Dr. Darling for routine checkup in 3 months.    Follow-up with your pulmonologist in 2 weeks as scheduled to check on breathing.    If you feel like your pain or breathing worsens over the next couple of weeks, proceed to ED for urgent evaluation      Review of external notes as documented in note  40 minutes spent on the date of the encounter doing chart review, history and exam, documentation and further activities as noted above       BMI:   Estimated body mass index is 27.58 kg/m  as calculated from the following:    Height as of this encounter: 5' (1.524 m).    Weight as of 3/11/21: 141 lb 3.2 oz (64 kg).   I have had an Advance Directives discussion with the patient.    Return in about 3 months (around 6/17/2021).    Aakash Flores CNP  [facility]   Irene CARY León is 76 y.o. and presents today for the following health issues   HPI   Patient comes the clinic today for hospital discharge  follow-up.    I saw the patient on 3/2.  That appointment was for hospital discharge follow-up as well.  She has had multiple COPD exacerbations over the last 30 days requiring hospitalization.    On 3/2 we elected to restart her daily azithromycin treatment.  She continued on the Advair.  Atrovent as needed.    She presented to the [facility] ED about 1 week later after our appointment after sustaining a fall.  She injured her chest wall after tripping and falling on a coffee table.  She had x-rays which were negative for fracture.    She was diagnosed with COPD exacerbation and discharged on prednisone and doxycycline.    She has follow-up with her pulmonologist at the end of this month.    She has been on Spiriva but felt like this caused voice hoarseness.  It looks like she has also been prescribed Brio Ellipta in the past but unclear why it was never filled.    In the exam room today, patient is slumped over in her wheelchair.  Shaking.  Tearful.  She cannot bear to tolerate her chest wall pain but does not want to go back to the ER.  She feels like the hospital will not accomplish much for her at this point.    She has a hard time taking deep breaths due to chest wall pain.    Her  was called back into the exam room.  Apparently, she and her  are having a hard time figuring out her portable oxygen unit.  They do not know how to turn it on.  This was explained to them in the exam room and clear instructions on how to operate the unit were provided.    Her weight has been stable over the last few days at about 138 pounds.  She continues to have trace ankle edema.      Review of Systems  Negative unless otherwise stated      Objective    Pulse 90   Ht 5' (1.524 m)   LMP 11/02/1992   SpO2 94%   BMI 27.58 kg/m    Body mass index is 27.58 kg/m .  Physical Exam  Slumped over in wheelchair.  Shaking due to pain.  Clutching left chest area    Rales appreciated in left lower lung fields.    Oxygen  initially at 86% but improved after 2 L nasal cannula applied.    +1/trace ankle edema bilateral lower extremities

## 2021-06-16 NOTE — PROGRESS NOTES
"  [facility]      NAME:  Irene León             :  1945    MRN: [id number]    CODE STATUS:  FULL CODE    FACILITY: [facility] [[id number]]         CHIEF COMPLAIN/REASON FOR VISIT:  Chief Complaint   Patient presents with     Review Of Multiple Medical Conditions     skin with redness       HISTORY OF PRESENT ILLNESS: Irene León is a 76 y.o. female being seen at the request of the nurses as they report red skin under left breast. Met with pt to review supposed skin impairment, no impairment is present. Pt up in chair, shortness of breath greatly improved since started scheduled nebs.0n 02 as per home status. CXR w/o pneumonia or CHF, but says does not ro COPD, pt with PMH of this. At [facility] from 3/23 to 3/30/2021 and per her EMR \"with PMH of COPD (on 2L at home) with two recent admissions at [facility], CHF (EF 40%), factor 5 Leiden on anticoagulation, CKD stage 3, breast cancer, depression and anxiety who presented to the ED 3/23 due to acute weakness and encephalopathy and found to have E. Coli pyelonephritis and bacteremia with severe sepsis:     Severe sepsis  Pyelonephritis   E. Coli bacteremia  Possible pneumonia  - improved with ceftriaxone; finished 7 day course     Chronic HFrEF with acute exacerbation.  - Echocardiogram this admit shows EF 45% w/elevated right atrial/ventricular pressures, near baseline per care everywhere (had similar EF on ECHO at outside hospital recently as well).  - on admission was hypovolemic and septic, but became hypervolemic following fluid resuscitation for sepsis  - Has diuresed well on IV lasix  - transition to orals 80/40 for discharge, which may need to be adjusted. Patient was on 80 daily at home, but per daughter this was not controlling her peripheral edema nor cough from pulmonary edema (daughter is RN) although patient wasn't that compliant.  - Toprol increased from 76 yo 100mg daily  - ARB on hold due to lower pressures " (may need to resume after discharge) Was on Losartan 50mg PTA     Acute kidney injury likely 2/2 sepsis  -resolved     Hypomag: stable today  Hypokalemia: repleted     Diarrhea: new, started a few days into hospitalization. C. Diff not sent per hospital protocol as no fever/abdominal pain and leukocytosis is improving; Most likely 2/2 antibiotics.Continue imodium; recommend c. Diff testing if diarrhea does not improve off of antibiotics.      Acute on Chronic hypoxemic respiratory failure due to COPD (on 2L at baseline) with pulmonary emphysema moderate to severe:  Has recurrent COPD exacerbation requiring hospitalization, most recently 3/9-3/11. COPD seemed stable here in hospital. Continued on inhalers. Weaned down to 2L of oxygen at discharge      L rib pain and T12 compression fracture  Since a fall 6 weeks ago, was on tramadol + lidocaine patches. Endorses ongoing severe pain. Oral dilaudid and lidocaine patch attempted in hospital after tramadol wasn't helping. However, this didn't seem to help wither.    D/w pain MD, Dr. Lima to get additional ideas to control her pain; he suggested abdominal binder and short course celebrex. Consider robaxin if this doesn't help.     Essential Hypertension:  - under control with metoprolol and diuresis  - hold ARB; resume if BP increases     Hyperlipidemia   -  statin     Overactive bladder  - Holding pta Myrbetriq ER + solifenacin (resume on discharge)      Depression and anxiety with insomnia:  - pta 150 mg venlafaxine daily - resume; d/w pharmacy to start at 75 for a few days then increase back to home dose  - pta 3 mg melatonin HS, 50 mg trazodone HS prn - dc for prolonged QTc     It looks like patient has chronically prolonged QTc on review of records;     Atrial fibrillation: Patient was on 20mg Xarelto PTA. I see one note re: afib in her outpatient records; she did have several episodes of afib with RVR here in the hospital. She thinks she is in on Xarelto for TIA  "with factor V leidin, which is not an indication. Given h/o PAF will continue Xarelto at reduced dose of 15mg. She is currently in sinus. I will order a event monitor for her on discharge to see if when not septic she truly has afib. Given her falls risk, would recommend dc Xarelto if no PAF or clear indication.      Resolved issues this admission:    Hypotension 2/2 sepsis    Acute encephalopathy 2/2 sepsis    Acute metabolic acidosis with tachypnea for compensation and no pulmonary reserve     Demand ischemia     Code Status: discussed on 3/27 with Stephanie; she wants to remain full code; d/w Leelee that I'm concerned and wish that she would accept more discussions re: advance directive/goals of care. Recommended Leelee approach Stephanie's PCP on this matter or refer for palliative care consult if this is available to her as outpatient. \"  Reports shortness of breath stable,  02 use at 2 l. She is feeling shortness of breath improved and   lungs clear. Continue current POC.    Allergies   Allergen Reactions     Sulfa (Sulfonamide Antibiotics) Rash     Headaches and dizziness.     Homeopathic Drugs Unknown and Other (See Comments)     Comment: runny nose, Comment: runny nose     Mold Extracts      Morphine (Pf)      hallucinate     Lisinopril Cough, Unknown and Itching     Comment: cough, Comment: cough     Sulfacetamide Sodium Rash   :     Current Outpatient Medications   Medication Sig     acetaminophen (TYLENOL) 500 MG tablet Take 1,000 mg by mouth 3 (three) times a day.     albuterol (PROAIR HFA;PROVENTIL HFA;VENTOLIN HFA) 90 mcg/actuation inhaler Inhale 2 puffs every 4 (four) hours as needed for wheezing.     ascorbic acid, vitamin C, (VITAMIN C) 1000 MG tablet Take 1,000 mg by mouth daily. Airborne 1 tab daily     azelastine (ASTELIN) 137 mcg (0.1 %) nasal spray 2 sprays into each nostril daily.     azithromycin (ZITHROMAX) 250 MG tablet Take 1 tablet (250 mg total) by mouth at bedtime.     benzonatate (TESSALON) 200 " MG capsule Take 1 capsule (200 mg total) by mouth 3 (three) times a day as needed for cough.     chlorhexidine (PERIDEX) 0.12 % solution Apply 15 mL to the mouth or throat at bedtime as needed.      cholecalciferol, vitamin D3, 1,000 unit tablet Take 1,000 Units by mouth daily.     diclofenac sodium (VOLTAREN) 1 % Gel Apply 1 g topically 3 (three) times a day.      EPINEPHrine (EPIPEN/ADRENACLICK/AUVI-Q) 0.3 mg/0.3 mL injection Inject 0.3 mg into the shoulder, thigh, or buttocks.     fluticasone (FLONASE) 50 mcg/actuation nasal spray Apply 1 spray into each nostril 2 (two) times a day as needed for allergies.      fluticasone-umeclidinium-vilanterol (TRELEGY ELLIPTA) 100-62.5-25 mcg DsDv inhaler Inhale 1 Inhalation daily.     furosemide (LASIX) 40 MG tablet Take 1 tablet (40 mg total) by mouth 2 (two) times a day. (Patient taking differently: Take 80 mg by mouth every morning. and 40mg at 2pm)     gabapentin (NEURONTIN) 300 MG capsule Take 2 capsules (600 mg total) by mouth 2 (two) times a day.     hydrOXYzine pamoate (VISTARIL) 25 MG capsule Take 25 mg by mouth 3 (three) times a day.      ipratropium (ATROVENT HFA) 17 mcg/actuation inhaler Inhale 2 puffs every 6 (six) hours.     loperamide (IMODIUM) 2 mg capsule Take 4 mg by mouth as needed for diarrhea (Every 3 hours as needed for Diarrhea).     melatonin 3 mg Tab tablet Take 1 tablet (3 mg total) by mouth at bedtime.     metoprolol succinate (TOPROL-XL) 50 MG 24 hr tablet Take 100 mg by mouth at bedtime.     MYRBETRIQ 50 mg Tb24 ER tablet TAKE 1 TABLET DAILY     pantoprazole (PROTONIX) 40 MG tablet Take 1 tablet (40 mg total) by mouth daily.     polyethylene glycol (MIRALAX) 17 gram packet Take 1 packet (17 g total) by mouth daily as needed.     rivaroxaban ANTICOAGULANT (XARELTO) 15 mg tablet Take 15 mg by mouth daily with supper.     simvastatin (ZOCOR) 40 MG tablet Take 1 tablet (40 mg total) by mouth at bedtime.     sodium chloride (OCEAN) 0.65 % nasal spray  2 sprays into each nostril every 2 (two) hours as needed for congestion.      solifenacin (VESICARE) 5 MG tablet Take 1 tablet (5 mg total) by mouth at bedtime.     tamoxifen (NOLVADEX) 20 MG tablet TAKE 1 TABLET DAILY     traMADoL (ULTRAM) 50 mg tablet Take 1 tablet (50 mg total) by mouth every 6 (six) hours as needed for pain or severe pain (7-10).     venlafaxine (EFFEXOR-XR) 150 MG 24 hr capsule TAKE 1 CAPSULE DAILY         REVIEW OF SYSTEMS:    Currently, no fever, chills, or rigors. Does not have any visual or hearing problems. Denies any chest pain, headaches, palpitations, lightheadedness, dizziness, NO CHANGE IN LEVEL OF  shortness of breath, or cough. Appetite is good. Denies any GERD symptoms. Denies any difficulty with swallowing, nausea, or vomiting.  Denies any abdominal pain, diarrhea or constipation. Denies any urinary symptoms. No insomnia. No active bleeding. No rash.       PHYSICAL EXAMINATION:  Vitals:    04/12/21 1505   BP: 107/70   Pulse: 85   Temp: 98.1  F (36.7  C)   Weight: 132 lb 9.6 oz (60.1 kg)         GENERAL: Awake, Alert, oriented x3, not in any form of acute distress, answers questions appropriately, follows simple commands, conversant  HEENT: Head is normocephalic with normal hair distribution. No evidence of trauma. Ears: No acute purulent discharge. Eyes: Conjunctivae pink with no scleral jaundice. Nose: Normal mucosa and septum. NECK: Supple with no cervical or supraclavicular lymphadenopathy. Trachea is midline.  LUNGS: Clear to auscultation  ABDOMEN: Globular and soft, nontender to palpation, non distended, no masses, no organomegaly, good bowel sounds, no rebound or guarding, no peritoneal signs.   EXTREMITIES: Atraumatic. Full range of motion on both upper and lower extremities, there is no tenderness to palpation, +  pedal edema, no cyanosis or clubbing, no calf tenderness, normal cap refill, no joint swelling.  SKIN: Warm and dry, no erythema noted, no rashes or lesions.   Skin has some faded bruising noted possible old IV sites.  NEUROLOGICAL: The patient is oriented to person, place and time. Strength and sensation are grossly intact. Face is symmetric.        Social distancing practiced for assessment whenever possible as well as PPE utilized for visit today.            LABS:    Lab Results   Component Value Date    WBC 9.5 04/05/2021    HGB 9.8 (L) 04/05/2021    HCT 31.2 (L) 04/05/2021    MCV 89 04/05/2021     04/05/2021       Results for orders placed or performed in visit on 04/05/21   Basic Metabolic Panel   Result Value Ref Range    Sodium 141 136 - 145 mmol/L    Potassium 3.9 3.5 - 5.0 mmol/L    Chloride 104 98 - 107 mmol/L    CO2 27 22 - 31 mmol/L    Anion Gap, Calculation 10 5 - 18 mmol/L    Glucose 77 70 - 125 mg/dL    Calcium 8.5 8.5 - 10.5 mg/dL    BUN 17 8 - 28 mg/dL    Creatinine 1.32 (H) 0.60 - 1.10 mg/dL    GFR MDRD Af Amer 47 (L) >60 mL/min/1.73m2    GFR MDRD Non Af Amer 39 (L) >60 mL/min/1.73m2           No results found for: HGBA1C  Vitamin D, Total (25-Hydroxy)   Date Value Ref Range Status   10/15/2019 47.6 30.0 - 80.0 ng/mL Final     No results found for: [id number]    ASSESSMENT/PLAN:  1. Pulmonary emphysema, unspecified emphysema type (H)    2. At high risk for impaired skin integrity      1. COPD/  Pulmonary emphysema: Is in a deconditioned state status post hospitalization on chronic oxygen  Will have PT and OT have services as well as skilled nurses to manage medical conditions and monitor oxygen saturations for oxygen.  Get approval for nebulizers vs MDI    2. Physical deconditioned and at risk for skin impairment. : Continues with PT/OT with energy saving techniques, safety and strengthening, SN services for chronic medical conditons managment  No red skin on inspection. Encourage to offload, wear bra if able to prevent skin brak down.          Electronically signed by:  Monet Byrd CNP  This progress note was completed using Dragon software  and there may be grammatical errors.

## 2021-06-16 NOTE — PROGRESS NOTES
"  [facility] MEDICAL CARE FOR SENIORS      NAME:  Irene León             :  1945    MRN: [id number]    CODE STATUS:  FULL CODE    FACILITY: [facility] [[id number]]         CHIEF COMPLAIN/REASON FOR VISIT:  Chief Complaint   Patient presents with     Review Of Multiple Medical Conditions     new admit, pain managment       HISTORY OF PRESENT ILLNESS: Irene León is a 76 y.o. female being seen at the request of the nurses patient is a new admission from [facility] and her orders needed to be reviewed reconciled.  Also saw patient today for review of symptoms and to initiate care with medical care for seniors.  At [facility] from 3/23 to 3/30/2021 and per her EMR \"with PMH of COPD (on 2L at home) with two recent admissions at [facility], CHF (EF 40%), factor 5 Leiden on anticoagulation, CKD stage 3, breast cancer, depression and anxiety who presented to the ED 3/23 due to acute weakness and encephalopathy and found to have E. Coli pyelonephritis and bacteremia with severe sepsis:     Severe sepsis  Pyelonephritis   E. Coli bacteremia  Possible pneumonia  - improved with ceftriaxone; finished 7 day course     Chronic HFrEF with acute exacerbation.  - Echocardiogram this admit shows EF 45% w/elevated right atrial/ventricular pressures, near baseline per care everywhere (had similar EF on ECHO at outside hospital recently as well).  - on admission was hypovolemic and septic, but became hypervolemic following fluid resuscitation for sepsis  - Has diuresed well on IV lasix  - transition to orals 80/40 for discharge, which may need to be adjusted. Patient was on 80 daily at home, but per daughter this was not controlling her peripheral edema nor cough from pulmonary edema (daughter is RN) although patient wasn't that compliant.  - Toprol increased from 76 yo 100mg daily  - ARB on hold due to lower pressures (may need to resume after discharge) Was on Losartan 50mg PTA     Acute kidney injury likely " 2/2 sepsis  -resolved     Hypomag: stable today  Hypokalemia: repleted     Diarrhea: new, started a few days into hospitalization. C. Diff not sent per hospital protocol as no fever/abdominal pain and leukocytosis is improving; Most likely 2/2 antibiotics.Continue imodium; recommend c. Diff testing if diarrhea does not improve off of antibiotics.      Acute on Chronic hypoxemic respiratory failure due to COPD (on 2L at baseline) with pulmonary emphysema moderate to severe:  Has recurrent COPD exacerbation requiring hospitalization, most recently 3/9-3/11. COPD seemed stable here in hospital. Continued on inhalers. Weaned down to 2L of oxygen at discharge      L rib pain and T12 compression fracture  Since a fall 6 weeks ago, was on tramadol + lidocaine patches. Endorses ongoing severe pain. Oral dilaudid and lidocaine patch attempted in hospital after tramadol wasn't helping. However, this didn't seem to help wither.    D/w pain MD, Dr. Lima to get additional ideas to control her pain; he suggested abdominal binder and short course celebrex. Consider robaxin if this doesn't help.     Essential Hypertension:  - under control with metoprolol and diuresis  - hold ARB; resume if BP increases     Hyperlipidemia   -  statin     Overactive bladder  - Holding pta Myrbetriq ER + solifenacin (resume on discharge)      Depression and anxiety with insomnia:  - pta 150 mg venlafaxine daily - resume; d/w pharmacy to start at 75 for a few days then increase back to home dose  - pta 3 mg melatonin HS, 50 mg trazodone HS prn - dc for prolonged QTc     It looks like patient has chronically prolonged QTc on review of records;     Atrial fibrillation: Patient was on 20mg Xarelto PTA. I see one note re: afib in her outpatient records; she did have several episodes of afib with RVR here in the hospital. She thinks she is in on Xarelto for TIA with factor V leidin, which is not an indication. Given h/o PAF will continue Xarelto at  "reduced dose of 15mg. She is currently in sinus. I will order a event monitor for her on discharge to see if when not septic she truly has afib. Given her falls risk, would recommend dc Xarelto if no PAF or clear indication.      Resolved issues this admission:    Hypotension 2/2 sepsis    Acute encephalopathy 2/2 sepsis    Acute metabolic acidosis with tachypnea for compensation and no pulmonary reserve     Demand ischemia     Code Status: discussed on 3/27 with Stephanie; she wants to remain full code; d/w Leelee that I'm concerned and wish that she would accept more discussions re: advance directive/goals of care. Recommended Leelee approach Stephanie's PCP on this matter or refer for palliative care consult if this is available to her as outpatient. \"  Discussed her TCU stay including medical care for seniors role in her care, med management and pain relief, as well as TCU routines and ACP.  We have adjusted her pain meds and we ordered some Tubigrip's due to some edema trace to 1+2 bilateral extremities.  She does have oxygen at baseline and uses at home as well.      Allergies   Allergen Reactions     Sulfa (Sulfonamide Antibiotics) Rash     Headaches and dizziness.     Homeopathic Drugs Unknown and Other (See Comments)     Comment: runny nose, Comment: runny nose     Mold Extracts      Morphine (Pf)      hallucinate     Lisinopril Cough, Unknown and Itching     Comment: cough, Comment: cough     Sulfacetamide Sodium Rash   :     Current Outpatient Medications   Medication Sig     acetaminophen (TYLENOL) 500 MG tablet Take 1,000 mg by mouth 3 (three) times a day.     albuterol (PROAIR HFA;PROVENTIL HFA;VENTOLIN HFA) 90 mcg/actuation inhaler Inhale 2 puffs every 4 (four) hours as needed for wheezing.     ascorbic acid, vitamin C, (VITAMIN C) 1000 MG tablet Take 1,000 mg by mouth daily. Airborne 1 tab daily     azelastine (ASTELIN) 137 mcg (0.1 %) nasal spray 2 sprays into each nostril daily.     azithromycin (ZITHROMAX) " 250 MG tablet Take 1 tablet (250 mg total) by mouth at bedtime.     benzonatate (TESSALON) 200 MG capsule Take 1 capsule (200 mg total) by mouth 3 (three) times a day as needed for cough.     celecoxib (CELEBREX) 200 MG capsule Take 200 mg by mouth 2 (two) times a day.     chlorhexidine (PERIDEX) 0.12 % solution Apply 15 mL to the mouth or throat at bedtime as needed.      cholecalciferol, vitamin D3, 1,000 unit tablet Take 1,000 Units by mouth daily.     diclofenac sodium (VOLTAREN) 1 % Gel Apply 1 g topically 3 (three) times a day.      EPINEPHrine (EPIPEN/ADRENACLICK/AUVI-Q) 0.3 mg/0.3 mL injection Inject 0.3 mg into the shoulder, thigh, or buttocks.     fluticasone (FLONASE) 50 mcg/actuation nasal spray Apply 1 spray into each nostril 2 (two) times a day as needed for allergies.      fluticasone-umeclidinium-vilanterol (TRELEGY ELLIPTA) 100-62.5-25 mcg DsDv inhaler Inhale 1 Inhalation daily.     furosemide (LASIX) 40 MG tablet Take 1 tablet (40 mg total) by mouth 2 (two) times a day. (Patient taking differently: Take 80 mg by mouth every morning. and 40mg at 2pm)     gabapentin (NEURONTIN) 300 MG capsule Take 2 capsules (600 mg total) by mouth 2 (two) times a day.     hydrOXYzine pamoate (VISTARIL) 25 MG capsule Take 25 mg by mouth 3 (three) times a day.      ipratropium (ATROVENT HFA) 17 mcg/actuation inhaler Inhale 2 puffs every 6 (six) hours.     loperamide (IMODIUM) 2 mg capsule Take 4 mg by mouth as needed for diarrhea (Every 3 hours as needed for Diarrhea).     melatonin 3 mg Tab tablet Take 1 tablet (3 mg total) by mouth at bedtime.     metoprolol succinate (TOPROL-XL) 50 MG 24 hr tablet Take 100 mg by mouth at bedtime.     MYRBETRIQ 50 mg Tb24 ER tablet TAKE 1 TABLET DAILY     pantoprazole (PROTONIX) 40 MG tablet Take 1 tablet (40 mg total) by mouth daily.     polyethylene glycol (MIRALAX) 17 gram packet Take 1 packet (17 g total) by mouth daily as needed.     rivaroxaban ANTICOAGULANT (XARELTO) 15 mg  tablet Take 15 mg by mouth daily with supper.     simvastatin (ZOCOR) 40 MG tablet Take 1 tablet (40 mg total) by mouth at bedtime.     sodium chloride (OCEAN) 0.65 % nasal spray 2 sprays into each nostril every 2 (two) hours as needed for congestion.      solifenacin (VESICARE) 5 MG tablet Take 1 tablet (5 mg total) by mouth at bedtime.     tamoxifen (NOLVADEX) 20 MG tablet TAKE 1 TABLET DAILY     traMADoL (ULTRAM) 50 mg tablet Take 1 tablet (50 mg total) by mouth every 6 (six) hours as needed for pain or severe pain (7-10).     venlafaxine (EFFEXOR-XR) 150 MG 24 hr capsule TAKE 1 CAPSULE DAILY         REVIEW OF SYSTEMS:    Currently, no fever, chills, or rigors. Does not have any visual or hearing problems. Denies any chest pain, headaches, palpitations, lightheadedness, dizziness, NO CHANGE IN LEVEL OF  shortness of breath, or cough. Appetite is good. Denies any GERD symptoms. Denies any difficulty with swallowing, nausea, or vomiting.  Denies any abdominal pain, diarrhea or constipation. Denies any urinary symptoms. No insomnia. No active bleeding. No rash.       PHYSICAL EXAMINATION:  Vitals:    03/31/21 1636   BP: 137/62   Pulse: 82   Temp: 98.1  F (36.7  C)   Weight: 142 lb 12.8 oz (64.8 kg)         GENERAL: Awake, Alert, oriented x3, not in any form of acute distress, answers questions appropriately, follows simple commands, conversant  HEENT: Head is normocephalic with normal hair distribution. No evidence of trauma. Ears: No acute purulent discharge. Eyes: Conjunctivae pink with no scleral jaundice. Nose: Normal mucosa and septum. NECK: Supple with no cervical or supraclavicular lymphadenopathy. Trachea is midline.   ABDOMEN: Globular and soft, nontender to palpation, non distended, no masses, no organomegaly, good bowel sounds, no rebound or guarding, no peritoneal signs.   EXTREMITIES: Atraumatic. Full range of motion on both upper and lower extremities, there is no tenderness to palpation, +  pedal  edema, no cyanosis or clubbing, no calf tenderness, normal cap refill, no joint swelling.  SKIN: Warm and dry, no erythema noted, no rashes or lesions.  Skin has some faded bruising noted possible old IV sites.  NEUROLOGICAL: The patient is oriented to person, place and time. Strength and sensation are grossly intact. Face is symmetric.        Social distancing practiced for assessment whenever possible as well as PPE utilized for visit today.            LABS:    Lab Results   Component Value Date    WBC 7.8 03/10/2021    HGB 11.5 (L) 03/10/2021    HCT 34.2 (L) 03/10/2021    MCV 89 03/10/2021     03/10/2021       Results for orders placed or performed during the hospital encounter of 03/09/21   Basic Metabolic Panel   Result Value Ref Range    Sodium 139 136 - 145 mmol/L    Potassium 4.4 3.5 - 5.0 mmol/L    Chloride 109 (H) 98 - 107 mmol/L    CO2 24 22 - 31 mmol/L    Anion Gap, Calculation 6 5 - 18 mmol/L    Glucose 96 70 - 125 mg/dL    Calcium 8.2 (L) 8.5 - 10.5 mg/dL    BUN 27 8 - 28 mg/dL    Creatinine 1.11 (H) 0.60 - 1.10 mg/dL    GFR MDRD Af Amer 58 (L) >60 mL/min/1.73m2    GFR MDRD Non Af Amer 48 (L) >60 mL/min/1.73m2           No results found for: HGBA1C  Vitamin D, Total (25-Hydroxy)   Date Value Ref Range Status   10/15/2019 47.6 30.0 - 80.0 ng/mL Final     No results found for: [id number]    ASSESSMENT/PLAN:  1. Pulmonary emphysema, unspecified emphysema type (H)    2. Compression fracture of T12 vertebra with routine healing, subsequent encounter    3. Physical deconditioning    4. Pain due to fracture    5. ACP (advance care planning)      1.  Pulmonary emphysema: Is in a deconditioned state status post hospitalization on chronic oxygen  Will have PT and OT have services as well as skilled nurses to manage medical conditions and monitor oxygen saturations for oxygen.    2/3/4: Prior to this hospitalization patient did have compression compression fracture of T12 she is physically deconditioned  and she has pains due to her fracture.  Unfortunately the hospital did not send her with a tramadol prescription although they have been ordered.  I am going to make some med adjustments for her comfort her tramadol has been ordered, we scheduled the Voltaren.  I also ordered Vistaril 25 mg p.o. 3 times daily to go along with her scheduled Tylenol to enhance pain management.    5, ACP is a full code, we did review her POLST today and confirmed that her CODE STATUS is full this per facility policy and POLST is completed with the patient.      Electronically signed by:  Monet Byrd CNP  This progress note was completed using Dragon software and there may be grammatical errors.      55 minutes spent of which greater than 60% was face to face communication with the patient about above plan of care  Including pain management with many med adjustments, medical care for seniors role in her care, as well as her ACP TCU routines and overall med review today.  Adjustments made and med rec done. Post Discharge Medication Reconciliation Status: discharge medications reconciled and changed, per note/orders

## 2021-06-16 NOTE — TELEPHONE ENCOUNTER
Telephone Encounter by Rose Marie Davies RN at 10/14/2019  9:39 AM     Author: Rose Marie Davies RN Service: -- Author Type: Registered Nurse    Filed: 10/14/2019  9:45 AM Encounter Date: 10/14/2019 Status: Signed    : Rose Marie Davies RN (Registered Nurse)       Cancer Care Refill Protocol Passed   venlafaxine (EFFEXOR-XR) 150 MG 24 hr capsule [Pharmacy Med Name: VENLAFAXINE HCL ER CAPS 150MG]   Rerun Protocol (10/14/2019 9:28 AM)      Cancer Care visit in the last 12 months      Last office visit: 6/13/2019 Devon Suarez MD Next office visit within 3 mo: Visit date not found  Last MTM visit: Visit date not found    Prescriber or current provider: Devon Suarez MD  Last diagnosis associated with med order: There are no diagnoses linked to this encounter.

## 2021-06-16 NOTE — TELEPHONE ENCOUNTER
Refill Approved    Rx renewed per Medication Renewal Policy. Medication was last renewed on 3/23/20.    Mahendra Fitzpatrick, [facility] Triage/Med Refill 3/19/2021     Requested Prescriptions   Pending Prescriptions Disp Refills     losartan (COZAAR) 50 MG tablet [Pharmacy Med Name: LOSARTAN TABS 50MG] 90 tablet 3     Sig: TAKE 1 TABLET DAILY       Angiotensin Receptor Blocker Protocol Passed - 3/18/2021  2:30 AM        Passed - PCP or prescribing provider visit in past 12 months       Last office visit with prescriber/PCP: 3/17/2021 Aakash Flores CNP OR same dept: Visit date not found OR same specialty: 3/11/2020 Aakash Flores CNP  Last physical: 1/19/2021 Last MTM visit: Visit date not found   Next visit within 3 mo: Visit date not found  Next physical within 3 mo: Visit date not found  Prescriber OR PCP: Aakash Flores CNP  Last diagnosis associated with med order: 1. Essential hypertension  - losartan (COZAAR) 50 MG tablet [Pharmacy Med Name: LOSARTAN TABS 50MG]; TAKE 1 TABLET DAILY  Dispense: 90 tablet; Refill: 3    If protocol passes may refill for 12 months if within 3 months of last provider visit (or a total of 15 months).             Passed - Serum potassium within the past 12 months     Lab Results   Component Value Date    Potassium 4.4 03/11/2021             Passed - Blood pressure filed in past 12 months     BP Readings from Last 1 Encounters:   03/17/21 150/70             Passed - Serum creatinine within the past 12 months     Creatinine   Date Value Ref Range Status   03/11/2021 1.11 (H) 0.60 - 1.10 mg/dL Final

## 2021-06-16 NOTE — PROGRESS NOTES
Clinic Care Coordination Contact  Care Coordination Transition Communication         Clinical Data: Patient was hospitalized at  from 3/23 to 3/30 with diagnosis of altered ms, shortness of breath, resp failure, copd.     Transition to Facility:              Facility Name: [facility]              Contact name and phone number/fax: [phone]    Plan: RN/SW Care Coordinator will await notification from facility staff informing RN/SW Care Coordinator of patient's discharge plans/needs. RN/SW Care Coordinator will review chart and outreach to facility staff every 4 weeks and as needed.

## 2021-06-16 NOTE — PROGRESS NOTES
[facility] Care note      Patient: Irene León  MRN: [id number]      [facility] [[id number]]  Reason for Visit     Chief Complaint   Patient presents with     Review Of Multiple Medical Conditions   Follow-up HYPOTENSION AND PAIN    Code Status     Full code    Assessment     ACUTE HYPOTENSIVE EPISODE WITH LOW BP 90/54 NOTED   WORSENING RESP DISTRESS WITH ABNORMAL LUNG EXAM  Severe sepsis with pyelonephritis  E. coli bacteremia  Possible pneumonia currently improved after antibiotic usage  COPD with chronic hypoxic respiratory failure on 2 L of oxygen at baseline  Chronic congestive heart failure with reduced ejection fraction with acute exacerbation  ANATOLIY  Electrolyte abnormalities with low potassium and magnesium  Atrial fibrillation with rapid ventricular response noted in the hospital  History of depression anxiety.  Insomnia.  History of falls.  Hypotension  Acute encephalopathy secondary to sepsis  Generalized weakness  Recurrent hospitalizations- 3 hospital admission noted in last  6 weeks including 1 ICU admission  FTT    Plan     Patient has been discharged to the TCU.  Patient seen today urgently because of request of nursing due to multiple concerns including severe hypertension as well as worsening respiratory distress with abnormal lung exam  Patient was noted to have low blood pressures.  Nursing was concerned about switching medications.  However unfortunately patient does not have much insight into her condition  Medication review done and she is on very high doses of metoprolol.  Hold parameters have been given to staff.  Orthostatic blood pressures to be checked.  Also RADHA hoses for bilateral lower extremities ordered  R/C BMP shows an improvement with a creatinine of 1.3 down from 1.5  HG 9.8  Stable labs noted.  She has an abnormal lung exam today with crackles heard in the left lung base.  Incentive spirometry 10 puffs 4 times daily.  Check chest x-ray to rule out any  aspirational event.  Speech evaluation.  Albuterol nebs 4 times daily to be scheduled.  She has a TLSO on most of the time day and wondering if that could be also contributing to her respiratory issues.  At baseline she has hypoxic respiratory failure and continues with oxygen.  We will follow up on her x-ray  Staff encouraged to talk to patient about improving her pulmonary hygiene  She was evaluated for CHF exacerbation concerns but her weights are actually down 10 pounds with improvement in lymphedema  MINIMIZE NARCOTICS    History     Patient is a very pleasant 76 y.o. female who is admitted to TCU  Patient presented to the hospital with increasing weakness and altered mental status.  She has underlying history of COPD.  sHe is on oxygen.  She continues to be short of breath  Today reported to have an abnormal lung exam with crackles heard in the left lung base  Also noted to be profoundly hypertensive due to with staff is quite concerned about pushing her meds and diuretics.  Eventually ended up holding her diuretics because of this concern  Unfortunately she ultimately fell on the floor and could not get up and was brought into the hospital  She was admitted with severe sepsis with pyelonephritis and E. coli bacteremia  She was also suspected to have acute pneumonia and these were treated with antibiotics given IV with improvement.  Reports no fever no congestion however does have intermittent cough but reports that she just cannot bring the phlegm up  She also was noted to have acute exacerbation of her congestive heart failure.  Echocardiogram shows an EF of 45%.  She was given IV Lasix for diuresis and has been discharged on high doses of oral Lasix.  Her Toprol was also increased.  She developed acute diarrhea felt to be secondary to antibiotics they have recommended continuing Imodium.  A FIB was stable and xarelto was continued. She had some episodes of A. fib with rapid ventricular response in the  hospital.  Dosage has been reduced prior to discharge  Heart rates have been stable    Past Medical History     Active Ambulatory (Non-Hospital) Problems    Diagnosis     ACP (advance care planning)     Exacerbation of intermittent asthma, unspecified asthma severity     Acute cystitis without hematuria     Factor V Leiden mutation (H)     Chronic anticoagulation     Essential hypertension, benign     Acute systolic congestive heart failure (H)     Pain due to fracture     High risk medication use     Heart failure exacerbated by sotalol (H)     T12 compression fracture (H)     Acute on chronic diastolic congestive heart failure (H)     Metabolic encephalopathy     Low back pain     Compression fracture of L1 vertebra, sequela     Chronic respiratory failure with hypoxia (H)     Chronic obstructive pulmonary disease, unspecified COPD type (H)     Chronic systolic congestive heart failure (H)     Hypoxia     S/P hip replacement, left     Hip pain, left     Acute hypoxemic respiratory failure (H)     Chest pain     COPD exacerbation (H)     Herpes zoster without complication     Chest wall pain     Benign essential hypertension     Dyslipidemia     Acute respiratory failure with hypoxia (H)     Pulsatile tinnitus of both ears     Hot flashes due to tamoxifen     Pleural nodules     Hot flashes, menopausal     Malignant neoplasm of central portion of left female breast (H)     CKD (chronic kidney disease) stage 3, GFR 30-59 ml/min     Dyspnea     Insomnia     Leg pain, left     Femur fracture (H)     GERD (gastroesophageal reflux disease)     Post menopausal syndrome     OP (osteoporosis)     Hypertension     Pulmonary emphysema, unspecified emphysema type (H)     Hyperlipidemia     Centrilobular emphysema (H)     Anisocoria     OAB (overactive bladder)     Factor 5 Leiden mutation, heterozygous (H)     Family history of blood disease     Bladder incontinence     Physical deconditioning     Cardiomyopathy, idiopathic  (H)     Depression with anxiety     Osteoarthritis of hip     Herniated intervertebral disc     Past Medical History:   Diagnosis Date     ANATOLIY (acute kidney injury) (H)      Allergic rhinitis      Arrhythmia      Atrial fibrillation with RVR (H)      Bacteremia      Breast cancer (H) 2017     Cardiomyopathy (H)      Centrilobular emphysema (H)      CHF (congestive heart failure) (H)      CKD (chronic kidney disease)      COPD (chronic obstructive pulmonary disease) (H)      Coronary artery disease      Depression      Dysphagia      E. coli sepsis (H)      Factor 5 Leiden mutation, heterozygous (H)      GERD (gastroesophageal reflux disease)      Hx of radiation therapy 2017     Hyperlipidemia      Hypertension      Hypokalemia      Hypomagnesemia      Idiopathic cardiomyopathy (H)      OAB (overactive bladder)      Osteoporosis      Sacral insufficiency fracture      Sinusitis      Syncope      Urge incontinence      Vocal cord dysfunction        Past Social History     Reviewed, and she  reports that she quit smoking about 13 years ago. She quit smokeless tobacco use about 16 years ago. She reports that she does not drink alcohol or use drugs.    Family History     Reviewed, and family history includes Breast cancer in her maternal aunt; Osteoporosis in an other family member; Prostate cancer in her brother and maternal uncle.    Medication List   Post Discharge Medication Reconciliation Status: discharge medications reconciled, continue medications without change   Current Outpatient Medications on File Prior to Visit   Medication Sig Dispense Refill     acetaminophen (TYLENOL) 500 MG tablet Take 1,000 mg by mouth 3 (three) times a day.       albuterol (PROAIR HFA;PROVENTIL HFA;VENTOLIN HFA) 90 mcg/actuation inhaler Inhale 2 puffs every 4 (four) hours as needed for wheezing. 1 Inhaler 0     ascorbic acid, vitamin C, (VITAMIN C) 1000 MG tablet Take 1,000 mg by mouth daily. Airborne 1 tab daily       azelastine  (ASTELIN) 137 mcg (0.1 %) nasal spray 2 sprays into each nostril daily.       azithromycin (ZITHROMAX) 250 MG tablet Take 1 tablet (250 mg total) by mouth at bedtime. 1 tablet 0     benzonatate (TESSALON) 200 MG capsule Take 1 capsule (200 mg total) by mouth 3 (three) times a day as needed for cough. 90 capsule 0     chlorhexidine (PERIDEX) 0.12 % solution Apply 15 mL to the mouth or throat at bedtime as needed.        cholecalciferol, vitamin D3, 1,000 unit tablet Take 1,000 Units by mouth daily.       diclofenac sodium (VOLTAREN) 1 % Gel Apply 1 g topically 3 (three) times a day.        EPINEPHrine (EPIPEN/ADRENACLICK/AUVI-Q) 0.3 mg/0.3 mL injection Inject 0.3 mg into the shoulder, thigh, or buttocks.       fluticasone (FLONASE) 50 mcg/actuation nasal spray Apply 1 spray into each nostril 2 (two) times a day as needed for allergies.        fluticasone-umeclidinium-vilanterol (TRELEGY ELLIPTA) 100-62.5-25 mcg DsDv inhaler Inhale 1 Inhalation daily. 1 each 3     furosemide (LASIX) 40 MG tablet Take 1 tablet (40 mg total) by mouth 2 (two) times a day. (Patient taking differently: Take 80 mg by mouth every morning. and 40mg at 2pm) 180 tablet 1     gabapentin (NEURONTIN) 300 MG capsule Take 2 capsules (600 mg total) by mouth 2 (two) times a day. 360 capsule 2     hydrOXYzine pamoate (VISTARIL) 25 MG capsule Take 25 mg by mouth 3 (three) times a day.        ipratropium (ATROVENT HFA) 17 mcg/actuation inhaler Inhale 2 puffs every 6 (six) hours. 3 Inhaler 3     loperamide (IMODIUM) 2 mg capsule Take 4 mg by mouth as needed for diarrhea (Every 3 hours as needed for Diarrhea).       melatonin 3 mg Tab tablet Take 1 tablet (3 mg total) by mouth at bedtime.  0     metoprolol succinate (TOPROL-XL) 50 MG 24 hr tablet Take 100 mg by mouth at bedtime.       MYRBETRIQ 50 mg Tb24 ER tablet TAKE 1 TABLET DAILY 90 tablet 3     pantoprazole (PROTONIX) 40 MG tablet Take 1 tablet (40 mg total) by mouth daily. 90 tablet 4      polyethylene glycol (MIRALAX) 17 gram packet Take 1 packet (17 g total) by mouth daily as needed.  0     rivaroxaban ANTICOAGULANT (XARELTO) 15 mg tablet Take 15 mg by mouth daily with supper.       simvastatin (ZOCOR) 40 MG tablet Take 1 tablet (40 mg total) by mouth at bedtime. 90 tablet 3     sodium chloride (OCEAN) 0.65 % nasal spray 2 sprays into each nostril every 2 (two) hours as needed for congestion.        solifenacin (VESICARE) 5 MG tablet Take 1 tablet (5 mg total) by mouth at bedtime. 90 tablet 3     tamoxifen (NOLVADEX) 20 MG tablet TAKE 1 TABLET DAILY 90 tablet 3     traMADoL (ULTRAM) 50 mg tablet Take 1 tablet (50 mg total) by mouth every 6 (six) hours as needed for pain or severe pain (7-10). 30 tablet 0     venlafaxine (EFFEXOR-XR) 150 MG 24 hr capsule TAKE 1 CAPSULE DAILY 90 capsule 3     No current facility-administered medications on file prior to visit.        Allergies     Allergies   Allergen Reactions     Sulfa (Sulfonamide Antibiotics) Rash     Headaches and dizziness.     Homeopathic Drugs Unknown and Other (See Comments)     Comment: runny nose, Comment: runny nose     Mold Extracts      Morphine (Pf)      hallucinate     Lisinopril Cough, Unknown and Itching     Comment: cough, Comment: cough     Sulfacetamide Sodium Rash       Review of Systems   A comprehensive review of 14 systems was done. Pertinent findings noted here and in history of present illness. All the rest negative.  Constitutional: Negative.  Negative for fever, chills, she has  activity change, appetite change and fatigue.   HENT: Negative for congestion and facial swelling.    Eyes: Negative for photophobia, redness and visual disturbance.   Respiratory: Negative for cough and chest tightness.    Cardiovascular: Negative for chest pain, palpitations and has  leg swelling.   Gastrointestinal: Negative for nausea, diarrhea, constipation, blood in stool and abdominal distention.   Genitourinary: Negative.   "  Musculoskeletal: Negative.  Reports limited endurance  Skin: Negative.    Neurological: Negative for dizziness, tremors, syncope, weakness, light-headedness and headaches.   Hematological: Does not bruise/bleed easily.   Psychiatric/Behavioral: Negative.        Physical Exam     Recent Vitals 4/7/2021   Height 5' 0\"   Weight 134 lbs 10 oz   BSA (m2) 1.61 m2   /86   Pulse 68   Temp 98.2   Temp src -   SpO2 92   Some recent data might be hidden   on 2l  R/C BP 90/54    Constitutional: Oriented to person, place, and time and appears well-developed.   HEENT:  Normocephalic and atraumatic.  Eyes: Conjunctivae and EOM are normal. Pupils are equal, round, and reactive to light. No discharge.  No scleral icterus. Nose normal. Mouth/Throat: Oropharynx is clear and moist. No oropharyngeal exudate.    NECK: Normal range of motion. Neck supple. No JVD present. No tracheal deviation present. No thyromegaly present.   CARDIOVASCULAR: Normal rate, regular rhythm and intact distal pulses.  Exam reveals no gallop and no friction rub.  Systolic murmur present.  PULMONARY: Effort normal and breath sounds normal. No respiratory distress.No Wheezing or rales.  faint rales heard TAWNY WORSE ON LEFT LUNG BASE  ABDOMEN: Soft. Bowel sounds are normal. No distension and no mass.  There is no tenderness. There is no rebound and no guarding. No HSM.  MUSCULOSKELETAL: Normal range of motion. 2+ LE edema and no tenderness. Mild kyphosis, HAS RIB tenderness.  LYMPH NODES: Has no cervical, supraclavicular, axillary and groin adenopathy.   NEUROLOGICAL: Alert and oriented to person, place, and time. No cranial nerve deficit.  Normal muscle tone. Coordination normal.   GENITOURINARY: Deferred exam.  SKIN: Skin is warm and dry. No rash noted. No erythema. No pallor.   EXTREMITIES: No cyanosis, no clubbing, 2+le edema. No Deformity.  PSYCHIATRIC: Normal mood, affect and behavior.      Lab Results     Recent Results (from the past 240 hour(s)) "   COVID-19 VIRUS (CORONAVIRUS) BY PCR - EXTERNAL RESULT    Collection Time: 03/30/21 10:27 AM    Specimen: Nasopharyngeal Swab    Specimen type and source: Swab (Source Required), Nasopharyngeal swab   Result Value Ref Range    COVID-19 Virus by PCR (External Result) Not Detected Not Detected   Basic Metabolic Panel    Collection Time: 04/01/21  7:32 AM   Result Value Ref Range    Sodium 135 (L) 136 - 145 mmol/L    Potassium 4.2 3.5 - 5.0 mmol/L    Chloride 100 98 - 107 mmol/L    CO2 25 22 - 31 mmol/L    Anion Gap, Calculation 10 5 - 18 mmol/L    Glucose 83 70 - 125 mg/dL    Calcium 8.2 (L) 8.5 - 10.5 mg/dL    BUN 18 8 - 28 mg/dL    Creatinine 1.54 (H) 0.60 - 1.10 mg/dL    GFR MDRD Af Amer 40 (L) >60 mL/min/1.73m2    GFR MDRD Non Af Amer 33 (L) >60 mL/min/1.73m2   HM2(CBC w/o Differential)    Collection Time: 04/01/21  7:32 AM   Result Value Ref Range    WBC 11.1 (H) 4.0 - 11.0 thou/uL    RBC 3.59 (L) 3.80 - 5.40 mill/uL    Hemoglobin 10.1 (L) 12.0 - 16.0 g/dL    Hematocrit 31.5 (L) 35.0 - 47.0 %    MCV 88 80 - 100 fL    MCH 28.1 27.0 - 34.0 pg    MCHC 32.1 32.0 - 36.0 g/dL    RDW 14.7 (H) 11.0 - 14.5 %    Platelets 358 140 - 440 thou/uL    MPV 9.7 8.5 - 12.5 fL   Magnesium    Collection Time: 04/01/21  7:32 AM   Result Value Ref Range    Magnesium 1.9 1.8 - 2.6 mg/dL   Basic Metabolic Panel    Collection Time: 04/05/21  6:37 AM   Result Value Ref Range    Sodium 141 136 - 145 mmol/L    Potassium 3.9 3.5 - 5.0 mmol/L    Chloride 104 98 - 107 mmol/L    CO2 27 22 - 31 mmol/L    Anion Gap, Calculation 10 5 - 18 mmol/L    Glucose 77 70 - 125 mg/dL    Calcium 8.5 8.5 - 10.5 mg/dL    BUN 17 8 - 28 mg/dL    Creatinine 1.32 (H) 0.60 - 1.10 mg/dL    GFR MDRD Af Amer 47 (L) >60 mL/min/1.73m2    GFR MDRD Non Af Amer 39 (L) >60 mL/min/1.73m2   HM2(CBC w/o Differential)    Collection Time: 04/05/21  6:37 AM   Result Value Ref Range    WBC 9.5 4.0 - 11.0 thou/uL    RBC 3.49 (L) 3.80 - 5.40 mill/uL    Hemoglobin 9.8 (L) 12.0 -  16.0 g/dL    Hematocrit 31.2 (L) 35.0 - 47.0 %    MCV 89 80 - 100 fL    MCH 28.1 27.0 - 34.0 pg    MCHC 31.4 (L) 32.0 - 36.0 g/dL    RDW 15.0 (H) 11.0 - 14.5 %    Platelets 365 140 - 440 thou/uL    MPV 9.4 8.5 - 12.5 fL              Electronically signed by  VASHTI Sanchez

## 2021-06-16 NOTE — PROGRESS NOTES
Clinic Care Coordination Contact    Patient in TCU- CCC RN to call TCU. CHW scheduled TCU follow up with CCC RN on 4/1/21 @ 9:00.

## 2021-06-16 NOTE — PROGRESS NOTES
[facility] Care note      Patient: Irene León  MRN: [id number]      [facility] [[id number]]  Reason for Visit     Chief Complaint   Patient presents with     Review Of Multiple Medical Conditions   Follow-up HYPOTENSION AND PAIN    Code Status     Full code    Assessment       Severe sepsis with pyelonephritis  E. coli bacteremia  Possible pneumonia currently improved after antibiotic usage  COPD with chronic hypoxic respiratory failure on 2 L of oxygen at baseline  Chronic congestive heart failure with reduced ejection fraction with acute exacerbation  ANATOLIY  Electrolyte abnormalities with low potassium and magnesium  Atrial fibrillation with rapid ventricular response noted in the hospital  History of depression anxiety.  Insomnia.  History of falls.  Hypotension  Acute encephalopathy secondary to sepsis  Generalized weakness  Recurrent hospitalizations- 3 hospital admission noted in last  6 weeks including 1 ICU admission  FTT    Plan     Patient has been discharged to the TCU.  She was seen today for follow-up.  Weights are stable at 130 pounds with minimal lymphedema noted on exam today.  She also has significant hypoxic respiratory failure and COPD and remains oxygen dependent.  She is happy with her albuterol nebs.  Nephrotoxins have been discontinued and recheck labs did show improvement with kidney functions been stable with a creatinine of 1.3  She has A. fib and heart rates have been stable she is on Xarelto for anticoagulation  Continue with her PT and OT      History     Patient is a very pleasant 76 y.o. female who is admitted to TCU  Patient presented to the hospital with increasing weakness and altered mental status.  She has underlying history of COPD.  sHe is on oxygen.  She remains compliant.  Her pulmonary toileting has been improved with scheduled nebulizers and incentive spirometry with significant improvement.  She was admitted with severe sepsis with pyelonephritis  and E. coli bacteremia  She was also suspected to have acute pneumonia and these were treated with antibiotics given IV with improvement.  She has been afebrile  Continues to have intermittent lung congestion with coughing but has clear phlegm so far  She also was noted to have acute exacerbation of her congestive heart failure.  Echocardiogram shows an EF of 45%.  She was given IV Lasix for diuresis and has been discharged on high doses of oral Lasix.  Her Toprol was also increased.  Weights are stable at 130 pounds with no worsening lymphedema noted on exam today  She developed acute diarrhea felt to be secondary to antibiotics they have recommended continuing Imodium.  A FIB was stable and xarelto was continued. She had some episodes of A. fib with rapid ventricular response in the hospital.  Dosage has been reduced prior to discharge  Heart rates have been stable  Recheck labs did show an improvement in her kidney functions      Past Medical History     Active Ambulatory (Non-Hospital) Problems    Diagnosis     At high risk for impaired skin integrity     ACP (advance care planning)     Exacerbation of intermittent asthma, unspecified asthma severity     Acute cystitis without hematuria     Factor V Leiden mutation (H)     Chronic anticoagulation     Essential hypertension, benign     Acute systolic congestive heart failure (H)     Pain due to fracture     High risk medication use     Heart failure exacerbated by sotalol (H)     T12 compression fracture (H)     Acute on chronic diastolic congestive heart failure (H)     Metabolic encephalopathy     Low back pain     Compression fracture of L1 vertebra, sequela     Chronic respiratory failure with hypoxia (H)     Chronic obstructive pulmonary disease, unspecified COPD type (H)     Chronic systolic congestive heart failure (H)     Hypoxia     S/P hip replacement, left     Hip pain, left     Acute hypoxemic respiratory failure (H)     Chest pain     COPD  exacerbation (H)     Herpes zoster without complication     Chest wall pain     Benign essential hypertension     Dyslipidemia     Acute respiratory failure with hypoxia (H)     Pulsatile tinnitus of both ears     Hot flashes due to tamoxifen     Pleural nodules     Hot flashes, menopausal     Malignant neoplasm of central portion of left female breast (H)     CKD (chronic kidney disease) stage 3, GFR 30-59 ml/min     Dyspnea     Insomnia     Leg pain, left     Femur fracture (H)     GERD (gastroesophageal reflux disease)     Post menopausal syndrome     OP (osteoporosis)     Hypertension     Pulmonary emphysema, unspecified emphysema type (H)     Hyperlipidemia     Centrilobular emphysema (H)     Anisocoria     OAB (overactive bladder)     Factor 5 Leiden mutation, heterozygous (H)     Family history of blood disease     Bladder incontinence     Physical deconditioning     Cardiomyopathy, idiopathic (H)     Depression with anxiety     Osteoarthritis of hip     Herniated intervertebral disc     Past Medical History:   Diagnosis Date     ANATOLIY (acute kidney injury) (H)      Allergic rhinitis      Arrhythmia      Atrial fibrillation with RVR (H)      Bacteremia      Breast cancer (H) 2017     Cardiomyopathy (H)      Centrilobular emphysema (H)      CHF (congestive heart failure) (H)      CKD (chronic kidney disease)      COPD (chronic obstructive pulmonary disease) (H)      Coronary artery disease      Depression      Dysphagia      E. coli sepsis (H)      Factor 5 Leiden mutation, heterozygous (H)      GERD (gastroesophageal reflux disease)      Hx of radiation therapy 2017     Hyperlipidemia      Hypertension      Hypokalemia      Hypomagnesemia      Idiopathic cardiomyopathy (H)      OAB (overactive bladder)      Osteoporosis      Sacral insufficiency fracture      Sinusitis      Syncope      Urge incontinence      Vocal cord dysfunction        Past Social History     Reviewed, and she  reports that she quit smoking  about 13 years ago. She quit smokeless tobacco use about 16 years ago. She reports that she does not drink alcohol or use drugs.    Family History     Reviewed, and family history includes Breast cancer in her maternal aunt; Osteoporosis in an other family member; Prostate cancer in her brother and maternal uncle.    Medication List   Post Discharge Medication Reconciliation Status: discharge medications reconciled, continue medications without change   Current Outpatient Medications on File Prior to Visit   Medication Sig Dispense Refill     acetaminophen (TYLENOL) 500 MG tablet Take 1,000 mg by mouth 3 (three) times a day.       albuterol (PROAIR HFA;PROVENTIL HFA;VENTOLIN HFA) 90 mcg/actuation inhaler Inhale 2 puffs every 4 (four) hours as needed for wheezing. 1 Inhaler 0     ascorbic acid, vitamin C, (VITAMIN C) 1000 MG tablet Take 1,000 mg by mouth daily. Airborne 1 tab daily       azelastine (ASTELIN) 137 mcg (0.1 %) nasal spray 2 sprays into each nostril daily.       azithromycin (ZITHROMAX) 250 MG tablet Take 1 tablet (250 mg total) by mouth at bedtime. 1 tablet 0     benzonatate (TESSALON) 200 MG capsule Take 1 capsule (200 mg total) by mouth 3 (three) times a day as needed for cough. 90 capsule 0     chlorhexidine (PERIDEX) 0.12 % solution Apply 15 mL to the mouth or throat at bedtime as needed.        cholecalciferol, vitamin D3, 1,000 unit tablet Take 1,000 Units by mouth daily.       diclofenac sodium (VOLTAREN) 1 % Gel Apply 1 g topically 3 (three) times a day.        EPINEPHrine (EPIPEN/ADRENACLICK/AUVI-Q) 0.3 mg/0.3 mL injection Inject 0.3 mg into the shoulder, thigh, or buttocks.       fluticasone (FLONASE) 50 mcg/actuation nasal spray Apply 1 spray into each nostril 2 (two) times a day as needed for allergies.        fluticasone-umeclidinium-vilanterol (TRELEGY ELLIPTA) 100-62.5-25 mcg DsDv inhaler Inhale 1 Inhalation daily. 1 each 3     furosemide (LASIX) 40 MG tablet Take 1 tablet (40 mg total)  by mouth 2 (two) times a day. (Patient taking differently: Take 80 mg by mouth every morning. and 40mg at 2pm) 180 tablet 1     gabapentin (NEURONTIN) 300 MG capsule Take 2 capsules (600 mg total) by mouth 2 (two) times a day. 360 capsule 2     hydrOXYzine pamoate (VISTARIL) 25 MG capsule Take 25 mg by mouth 3 (three) times a day.        ipratropium (ATROVENT HFA) 17 mcg/actuation inhaler Inhale 2 puffs every 6 (six) hours. 3 Inhaler 3     loperamide (IMODIUM) 2 mg capsule Take 4 mg by mouth as needed for diarrhea (Every 3 hours as needed for Diarrhea).       melatonin 3 mg Tab tablet Take 1 tablet (3 mg total) by mouth at bedtime.  0     metoprolol succinate (TOPROL-XL) 50 MG 24 hr tablet Take 100 mg by mouth at bedtime.       MYRBETRIQ 50 mg Tb24 ER tablet TAKE 1 TABLET DAILY 90 tablet 3     pantoprazole (PROTONIX) 40 MG tablet Take 1 tablet (40 mg total) by mouth daily. 90 tablet 4     polyethylene glycol (MIRALAX) 17 gram packet Take 1 packet (17 g total) by mouth daily as needed.  0     rivaroxaban ANTICOAGULANT (XARELTO) 15 mg tablet Take 15 mg by mouth daily with supper.       simvastatin (ZOCOR) 40 MG tablet Take 1 tablet (40 mg total) by mouth at bedtime. 90 tablet 3     sodium chloride (OCEAN) 0.65 % nasal spray 2 sprays into each nostril every 2 (two) hours as needed for congestion.        solifenacin (VESICARE) 5 MG tablet Take 1 tablet (5 mg total) by mouth at bedtime. 90 tablet 3     tamoxifen (NOLVADEX) 20 MG tablet TAKE 1 TABLET DAILY 90 tablet 3     traMADoL (ULTRAM) 50 mg tablet Take 1 tablet (50 mg total) by mouth every 6 (six) hours as needed for pain or severe pain (7-10). 30 tablet 0     venlafaxine (EFFEXOR-XR) 150 MG 24 hr capsule TAKE 1 CAPSULE DAILY 90 capsule 3     No current facility-administered medications on file prior to visit.        Allergies     Allergies   Allergen Reactions     Sulfa (Sulfonamide Antibiotics) Rash     Headaches and dizziness.     Homeopathic Drugs Unknown and  "Other (See Comments)     Comment: runny nose, Comment: runny nose     Mold Extracts      Morphine (Pf)      hallucinate     Lisinopril Cough, Unknown and Itching     Comment: cough, Comment: cough     Sulfacetamide Sodium Rash       Review of Systems   A comprehensive review of 14 systems was done. Pertinent findings noted here and in history of present illness. All the rest negative.  Constitutional: Negative.  Negative for fever, chills, she has  activity change, appetite change and fatigue.   HENT: Negative for congestion and facial swelling.    Eyes: Negative for photophobia, redness and visual disturbance.   Respiratory: Negative for cough and chest tightness.    Cardiovascular: Negative for chest pain, palpitations and has  leg swelling.   Gastrointestinal: Negative for nausea, diarrhea, constipation, blood in stool and abdominal distention.   Genitourinary: Negative.    Musculoskeletal: Negative.  Reports limited endurance  Skin: Negative.    Neurological: Negative for dizziness, tremors, syncope, weakness, light-headedness and headaches.   Hematological: Does not bruise/bleed easily.   Psychiatric/Behavioral: Negative.        Physical Exam     Recent Vitals 4/15/2021   Height 5' 0\"   Weight 132 lbs 10 oz   BSA (m2) 1.6 m2   /78   Pulse 80   Temp 98.3   SpO2 90   Some recent data might be hidden   on 2l  R/C BP 90/54    Constitutional: Oriented to person, place, and time and appears well-developed.   HEENT:  Normocephalic and atraumatic.  Eyes: Conjunctivae and EOM are normal. Pupils are equal, round, and reactive to light. No discharge.  No scleral icterus. Nose normal. Mouth/Throat: Oropharynx is clear and moist. No oropharyngeal exudate.    NECK: Normal range of motion. Neck supple. No JVD present. No tracheal deviation present. No thyromegaly present.   CARDIOVASCULAR: Normal rate, regular rhythm and intact distal pulses.  Exam reveals no gallop and no friction rub.  Systolic murmur " present.  PULMONARY: Effort normal and breath sounds normal. No respiratory distress.No Wheezing or rales.  faint rales heard TAWNY WORSE ON LEFT LUNG BASE  ABDOMEN: Soft. Bowel sounds are normal. No distension and no mass.  There is no tenderness. There is no rebound and no guarding. No HSM.  MUSCULOSKELETAL: Normal range of motion. 2+ LE edema and no tenderness. Mild kyphosis, HAS RIB tenderness.  LYMPH NODES: Has no cervical, supraclavicular, axillary and groin adenopathy.   NEUROLOGICAL: Alert and oriented to person, place, and time. No cranial nerve deficit.  Normal muscle tone. Coordination normal.   GENITOURINARY: Deferred exam.  SKIN: Skin is warm and dry. No rash noted. No erythema. No pallor.   EXTREMITIES: No cyanosis, no clubbing, 2+le edema. No Deformity.  PSYCHIATRIC: Normal mood, affect and behavior.      Lab Results     Results for orders placed or performed in visit on 04/05/21   Basic Metabolic Panel   Result Value Ref Range    Sodium 141 136 - 145 mmol/L    Potassium 3.9 3.5 - 5.0 mmol/L    Chloride 104 98 - 107 mmol/L    CO2 27 22 - 31 mmol/L    Anion Gap, Calculation 10 5 - 18 mmol/L    Glucose 77 70 - 125 mg/dL    Calcium 8.5 8.5 - 10.5 mg/dL    BUN 17 8 - 28 mg/dL    Creatinine 1.32 (H) 0.60 - 1.10 mg/dL    GFR MDRD Af Amer 47 (L) >60 mL/min/1.73m2    GFR MDRD Non Af Amer 39 (L) >60 mL/min/1.73m2                Electronically signed by  VASHTI Sanchez

## 2021-06-16 NOTE — TELEPHONE ENCOUNTER
[facility] Nurse Triage Telephone Note      Provider: GERA Avalos  Facility: [facility]    Facility Type: TCU    Caller: Philly  Call Back Number:  [phone]    Allergies: Sulfa (sulfonamide antibiotics), Homeopathic drugs, Mold extracts, Morphine (pf), Lisinopril, and Sulfacetamide sodium    Reason for call: Nurse calling to report Heme 2 and BMP results.  Notable meds:  Xarelto 15mg daily, Vitamin C 1000mg daily, Azithromycin 250mg daily, Tamoxifen 20mg daily, Lasix 80mg Q AM and 40mg Q PM, Metoprolol ER 100mg Q HS, Protonix 40mg daily.       Verbal Order/Direction given by Provider: No new orders.      Provider giving order: GERA Avalos    Verbal order given to: Philly Gabriel RN

## 2021-06-16 NOTE — PROGRESS NOTES
[facility] Care note      Patient: Irene León  MRN: [id number]      [facility] [[id number]]  Reason for Visit     Chief Complaint   Patient presents with     Review Of Multiple Medical Conditions   Follow-up HYPOTENSION AND PAIN    Code Status     Full code    Assessment       Severe sepsis with pyelonephritis  E. coli bacteremia  Possible pneumonia currently improved after antibiotic usage  COPD with chronic hypoxic respiratory failure on 2 L of oxygen at baseline  Chronic congestive heart failure with reduced ejection fraction with acute exacerbation  ANATOLIY  Electrolyte abnormalities with low potassium and magnesium  Atrial fibrillation with rapid ventricular response noted in the hospital  History of depression anxiety.  Insomnia.  History of falls.  Hypotension  Acute encephalopathy secondary to sepsis  Generalized weakness  Recurrent hospitalizations- 3 hospital admission noted in last  6 weeks including 1 ICU admission  FTT    Plan     Patient has been discharged to the TCU.  She was seen today for follow-up.  Recently seen for hypertension and abnormal lung exam.  Reweigh today is 131 pounds which is almost a 10 pound drop since admission.  Lymphedema concerns are minimal.  Recheck labs done in the TCU showed resolution of her leukocytosis  Recheck white count was 9.5  Her BMP has also shown improvement with a creatinine of 1.3 down from 1.5  In addition hyponatremia has resolved.  She has responded very well to incentive spirometry and aggressive pulmonary toileting and doing quite well with albuterol nebulizer is being scheduled for her.  Pain concerns are stable we are trying to minimize narcotics.  Continue with her PT and OT      History     Patient is a very pleasant 76 y.o. female who is admitted to TCU  Patient presented to the hospital with increasing weakness and altered mental status.  She has underlying history of COPD.  sHe is on oxygen.  She continues to be short of  breath  However overall improvement noted since she was put on pulmonary toileting  She is all albuterol nebulizers which have been scheduled with significant improvement noted she reports that she feels her breathing has improved significantly  She wants to continue with the same treatment at home when she discharges  Also noted to be profoundly hypertensive due to with staff is quite concerned about pushing her meds and diuretics.  Eventually ended up holding her diuretics because of this concern  Unfortunately she ultimately fell on the floor and could not get up and was brought into the hospital  She was admitted with severe sepsis with pyelonephritis and E. coli bacteremia  She was also suspected to have acute pneumonia and these were treated with antibiotics given IV with improvement.  Reports no fever no congestion however does have intermittent cough but reports that she just cannot bring the phlegm up  She also was noted to have acute exacerbation of her congestive heart failure.  Echocardiogram shows an EF of 45%.  She was given IV Lasix for diuresis and has been discharged on high doses of oral Lasix.  Her Toprol was also increased.  Today her weights were reviewed initially she is down more than 10 pounds.  Lymphedema concerns are minimal.  She reports compliance with her diuretics  She developed acute diarrhea felt to be secondary to antibiotics they have recommended continuing Imodium.  A FIB was stable and xarelto was continued. She had some episodes of A. fib with rapid ventricular response in the hospital.  Dosage has been reduced prior to discharge  Heart rates have been stable  Initially some hypotensive episodes noted but no improvement noted.  We are trying to minimize nephrotoxins due to renal impairment concern but her last creatinine had shown improvement with resolution of mild hyponatremia        Past Medical History     Active Ambulatory (Non-Hospital) Problems    Diagnosis     At high  risk for impaired skin integrity     ACP (advance care planning)     Exacerbation of intermittent asthma, unspecified asthma severity     Acute cystitis without hematuria     Factor V Leiden mutation (H)     Chronic anticoagulation     Essential hypertension, benign     Acute systolic congestive heart failure (H)     Pain due to fracture     High risk medication use     Heart failure exacerbated by sotalol (H)     T12 compression fracture (H)     Acute on chronic diastolic congestive heart failure (H)     Metabolic encephalopathy     Low back pain     Compression fracture of L1 vertebra, sequela     Chronic respiratory failure with hypoxia (H)     Chronic obstructive pulmonary disease, unspecified COPD type (H)     Chronic systolic congestive heart failure (H)     Hypoxia     S/P hip replacement, left     Hip pain, left     Acute hypoxemic respiratory failure (H)     Chest pain     COPD exacerbation (H)     Herpes zoster without complication     Chest wall pain     Benign essential hypertension     Dyslipidemia     Acute respiratory failure with hypoxia (H)     Pulsatile tinnitus of both ears     Hot flashes due to tamoxifen     Pleural nodules     Hot flashes, menopausal     Malignant neoplasm of central portion of left female breast (H)     CKD (chronic kidney disease) stage 3, GFR 30-59 ml/min     Dyspnea     Insomnia     Leg pain, left     Femur fracture (H)     GERD (gastroesophageal reflux disease)     Post menopausal syndrome     OP (osteoporosis)     Hypertension     Pulmonary emphysema, unspecified emphysema type (H)     Hyperlipidemia     Centrilobular emphysema (H)     Anisocoria     OAB (overactive bladder)     Factor 5 Leiden mutation, heterozygous (H)     Family history of blood disease     Bladder incontinence     Physical deconditioning     Cardiomyopathy, idiopathic (H)     Depression with anxiety     Osteoarthritis of hip     Herniated intervertebral disc     Past Medical History:   Diagnosis Date      ANATOLIY (acute kidney injury) (H)      Allergic rhinitis      Arrhythmia      Atrial fibrillation with RVR (H)      Bacteremia      Breast cancer (H) 2017     Cardiomyopathy (H)      Centrilobular emphysema (H)      CHF (congestive heart failure) (H)      CKD (chronic kidney disease)      COPD (chronic obstructive pulmonary disease) (H)      Coronary artery disease      Depression      Dysphagia      E. coli sepsis (H)      Factor 5 Leiden mutation, heterozygous (H)      GERD (gastroesophageal reflux disease)      Hx of radiation therapy 2017     Hyperlipidemia      Hypertension      Hypokalemia      Hypomagnesemia      Idiopathic cardiomyopathy (H)      OAB (overactive bladder)      Osteoporosis      Sacral insufficiency fracture      Sinusitis      Syncope      Urge incontinence      Vocal cord dysfunction        Past Social History     Reviewed, and she  reports that she quit smoking about 13 years ago. She quit smokeless tobacco use about 16 years ago. She reports that she does not drink alcohol or use drugs.    Family History     Reviewed, and family history includes Breast cancer in her maternal aunt; Osteoporosis in an other family member; Prostate cancer in her brother and maternal uncle.    Medication List   Post Discharge Medication Reconciliation Status: discharge medications reconciled, continue medications without change   Current Outpatient Medications on File Prior to Visit   Medication Sig Dispense Refill     acetaminophen (TYLENOL) 500 MG tablet Take 1,000 mg by mouth 3 (three) times a day.       albuterol (PROAIR HFA;PROVENTIL HFA;VENTOLIN HFA) 90 mcg/actuation inhaler Inhale 2 puffs every 4 (four) hours as needed for wheezing. 1 Inhaler 0     ascorbic acid, vitamin C, (VITAMIN C) 1000 MG tablet Take 1,000 mg by mouth daily. Airborne 1 tab daily       azelastine (ASTELIN) 137 mcg (0.1 %) nasal spray 2 sprays into each nostril daily.       azithromycin (ZITHROMAX) 250 MG tablet Take 1 tablet (250 mg  total) by mouth at bedtime. 1 tablet 0     benzonatate (TESSALON) 200 MG capsule Take 1 capsule (200 mg total) by mouth 3 (three) times a day as needed for cough. 90 capsule 0     chlorhexidine (PERIDEX) 0.12 % solution Apply 15 mL to the mouth or throat at bedtime as needed.        cholecalciferol, vitamin D3, 1,000 unit tablet Take 1,000 Units by mouth daily.       diclofenac sodium (VOLTAREN) 1 % Gel Apply 1 g topically 3 (three) times a day.        EPINEPHrine (EPIPEN/ADRENACLICK/AUVI-Q) 0.3 mg/0.3 mL injection Inject 0.3 mg into the shoulder, thigh, or buttocks.       fluticasone (FLONASE) 50 mcg/actuation nasal spray Apply 1 spray into each nostril 2 (two) times a day as needed for allergies.        fluticasone-umeclidinium-vilanterol (TRELEGY ELLIPTA) 100-62.5-25 mcg DsDv inhaler Inhale 1 Inhalation daily. 1 each 3     furosemide (LASIX) 40 MG tablet Take 1 tablet (40 mg total) by mouth 2 (two) times a day. (Patient taking differently: Take 80 mg by mouth every morning. and 40mg at 2pm) 180 tablet 1     gabapentin (NEURONTIN) 300 MG capsule Take 2 capsules (600 mg total) by mouth 2 (two) times a day. 360 capsule 2     hydrOXYzine pamoate (VISTARIL) 25 MG capsule Take 25 mg by mouth 3 (three) times a day.        ipratropium (ATROVENT HFA) 17 mcg/actuation inhaler Inhale 2 puffs every 6 (six) hours. 3 Inhaler 3     loperamide (IMODIUM) 2 mg capsule Take 4 mg by mouth as needed for diarrhea (Every 3 hours as needed for Diarrhea).       melatonin 3 mg Tab tablet Take 1 tablet (3 mg total) by mouth at bedtime.  0     metoprolol succinate (TOPROL-XL) 50 MG 24 hr tablet Take 100 mg by mouth at bedtime.       MYRBETRIQ 50 mg Tb24 ER tablet TAKE 1 TABLET DAILY 90 tablet 3     pantoprazole (PROTONIX) 40 MG tablet Take 1 tablet (40 mg total) by mouth daily. 90 tablet 4     polyethylene glycol (MIRALAX) 17 gram packet Take 1 packet (17 g total) by mouth daily as needed.  0     rivaroxaban ANTICOAGULANT (XARELTO) 15 mg  tablet Take 15 mg by mouth daily with supper.       simvastatin (ZOCOR) 40 MG tablet Take 1 tablet (40 mg total) by mouth at bedtime. 90 tablet 3     sodium chloride (OCEAN) 0.65 % nasal spray 2 sprays into each nostril every 2 (two) hours as needed for congestion.        solifenacin (VESICARE) 5 MG tablet Take 1 tablet (5 mg total) by mouth at bedtime. 90 tablet 3     tamoxifen (NOLVADEX) 20 MG tablet TAKE 1 TABLET DAILY 90 tablet 3     traMADoL (ULTRAM) 50 mg tablet Take 1 tablet (50 mg total) by mouth every 6 (six) hours as needed for pain or severe pain (7-10). 30 tablet 0     venlafaxine (EFFEXOR-XR) 150 MG 24 hr capsule TAKE 1 CAPSULE DAILY 90 capsule 3     No current facility-administered medications on file prior to visit.        Allergies     Allergies   Allergen Reactions     Sulfa (Sulfonamide Antibiotics) Rash     Headaches and dizziness.     Homeopathic Drugs Unknown and Other (See Comments)     Comment: runny nose, Comment: runny nose     Mold Extracts      Morphine (Pf)      hallucinate     Lisinopril Cough, Unknown and Itching     Comment: cough, Comment: cough     Sulfacetamide Sodium Rash       Review of Systems   A comprehensive review of 14 systems was done. Pertinent findings noted here and in history of present illness. All the rest negative.  Constitutional: Negative.  Negative for fever, chills, she has  activity change, appetite change and fatigue.   HENT: Negative for congestion and facial swelling.    Eyes: Negative for photophobia, redness and visual disturbance.   Respiratory: Negative for cough and chest tightness.    Cardiovascular: Negative for chest pain, palpitations and has  leg swelling.   Gastrointestinal: Negative for nausea, diarrhea, constipation, blood in stool and abdominal distention.   Genitourinary: Negative.    Musculoskeletal: Negative.  Reports limited endurance  Skin: Negative.    Neurological: Negative for dizziness, tremors, syncope, weakness, light-headedness and  "headaches.   Hematological: Does not bruise/bleed easily.   Psychiatric/Behavioral: Negative.        Physical Exam     Recent Vitals 4/15/2021   Height 5' 0\"   Weight 132 lbs 10 oz   BSA (m2) 1.6 m2   /78   Pulse 80   Temp 98.3   SpO2 90   Some recent data might be hidden   on 2l  R/C BP 90/54    Constitutional: Oriented to person, place, and time and appears well-developed.   HEENT:  Normocephalic and atraumatic.  Eyes: Conjunctivae and EOM are normal. Pupils are equal, round, and reactive to light. No discharge.  No scleral icterus. Nose normal. Mouth/Throat: Oropharynx is clear and moist. No oropharyngeal exudate.    NECK: Normal range of motion. Neck supple. No JVD present. No tracheal deviation present. No thyromegaly present.   CARDIOVASCULAR: Normal rate, regular rhythm and intact distal pulses.  Exam reveals no gallop and no friction rub.  Systolic murmur present.  PULMONARY: Effort normal and breath sounds normal. No respiratory distress.No Wheezing or rales.  faint rales heard TAWNY WORSE ON LEFT LUNG BASE  ABDOMEN: Soft. Bowel sounds are normal. No distension and no mass.  There is no tenderness. There is no rebound and no guarding. No HSM.  MUSCULOSKELETAL: Normal range of motion. 2+ LE edema and no tenderness. Mild kyphosis, HAS RIB tenderness.  LYMPH NODES: Has no cervical, supraclavicular, axillary and groin adenopathy.   NEUROLOGICAL: Alert and oriented to person, place, and time. No cranial nerve deficit.  Normal muscle tone. Coordination normal.   GENITOURINARY: Deferred exam.  SKIN: Skin is warm and dry. No rash noted. No erythema. No pallor.   EXTREMITIES: No cyanosis, no clubbing, 2+le edema. No Deformity.  PSYCHIATRIC: Normal mood, affect and behavior.      Lab Results     Results for orders placed or performed in visit on 04/05/21   Basic Metabolic Panel   Result Value Ref Range    Sodium 141 136 - 145 mmol/L    Potassium 3.9 3.5 - 5.0 mmol/L    Chloride 104 98 - 107 mmol/L    CO2 27 22 - " 31 mmol/L    Anion Gap, Calculation 10 5 - 18 mmol/L    Glucose 77 70 - 125 mg/dL    Calcium 8.5 8.5 - 10.5 mg/dL    BUN 17 8 - 28 mg/dL    Creatinine 1.32 (H) 0.60 - 1.10 mg/dL    GFR MDRD Af Amer 47 (L) >60 mL/min/1.73m2    GFR MDRD Non Af Amer 39 (L) >60 mL/min/1.73m2                Electronically signed by  VASHTI Sanchez

## 2021-06-16 NOTE — TELEPHONE ENCOUNTER
Telephone Encounter by Yaquelin Carter LPN at 3/9/2020  1:01 PM     Author: Yaquelin Carter LPN Service: -- Author Type: Licensed Nurse    Filed: 3/9/2020  1:04 PM Encounter Date: 3/9/2020 Status: Signed    : Yaquelin Carter LPN (Licensed Nurse)       Pill indentified as matching description on bottle or [email or web address]:    Last dose taken: 3-8-2020  start date/ date: 1-  Quantity ordered: 56  Expected count: 0  Actual count: 4  Consistent/inconsistent: inconsistent, not taking as prescribed  :    Returned/Destroyed: Destroyed     UDT obtained: 1-2020

## 2021-06-16 NOTE — PROGRESS NOTES
[facility] note      Patient: Irene León  MRN: [id number]      [facility] [[id number]]  Reason for Visit     Chief Complaint   Patient presents with     H & P       Code Status     Full code    Assessment     Acute patient seen with a creatinine of 1.5 on admission  Mild leukocytosis white count of 11.1  Hypocalcemia persistent with a calcium of 8.2  Mild hyponatremia with a sodium of 135  Chronic anemia hemoglobin 10.1  Severe sepsis with pyelonephritis  E. coli bacteremia  Possible pneumonia currently improved after antibiotic usage  COPD with chronic hypoxic respiratory failure on 2 L of oxygen at baseline  Chronic congestive heart failure with reduced ejection fraction with acute exacerbation  ANATOLIY  Electrolyte abnormalities with low potassium and magnesium  Atrial fibrillation with rapid ventricular response noted in the hospital  History of depression anxiety.  Insomnia.  History of falls.  Hypotension  Acute encephalopathy secondary to sepsis  Generalized weakness  Recurrent hospitalizations- 3 hospital admission noted in last  6 weeks including 1 ICU admission  FTT    Plan     Patient has been discharged to the TCU.  It was felt that she was not able to take care of her complex medical cares at home with multiple hospitalizations and ER visits noted.  Has been admitted 3 times including to ICU in last 6weeks  She presented with acute sepsis believed to be secondary to UTI.  She was also noted to have pyelonephritis with sepsis and possible pneumonia and was treated with antibiotics.  She had acute metabolic encephalopathy secondary to sepsis which also cleared.  Hospital course complicated by acute on chronic hypoxemic respiratory failure  Currently weaned to 2 L of oxygen.  Continues to have difficulty with respiration and gets short winded easily.  Also complicated by acute CHF exacerbation requiring diuretics.  Close monitoring of weights needed.  She also has lymphedema  which is chronic.  Currently discharged on high doses of diuretics  She also developed diarrhea felt to be antibiotic related and discharged on Imodium.  Has chronic pain due to recent history of T12 compression fracture.  Unfortunately narcotics were not very helpful  Hypotension resolved once her losartan was held.  We will need to monitor her blood pressures closely  Xarelto dosage has been reduced as it was not felt to be an appropriate dose for atrial fibrillation.  Patient believes she was taking a higher dose for factor V Leiden  They recommended holding Xarelto and discontinuing it if she continues to fall.  Recheck labs reviewed and she has mild leukocytosis with a white count of 11.1.  Suspect it is from recent hospitalization we will continue to monitor and recheck CBC again.  Recheck UA UC if she has persistent leukocytosis.  Also has renal impairment and continues on high doses of diuretics  At present patient is attributing multiple rehospitalization to volume overload and pulmonary edema which clears her COPD also.  She has significant evidence with lymphedema also.  She will continue with her diuretics and we will not hold her diuretics.  We will hold Celebrex which is a nephrotoxin  Recheck BMP closely again.  Reassess at my next visit including with weight and fluid status.  Also has mild hyponatremia and hypocalcemia.  Prostat 4 ounce to be added to her daily regimen.  Monitor oral intakes.  Recheck CBC and BMP again closely  Pain management to be optimized at the request.  She has been started on Vistaril.  Voltaren gel.  Topically to her rib pain  Tramadol has been added to her regimen.  We will try to minimize narcotics.  The hospital did report that it was not very effective for her anyway  There is also suspicion the patient may have cognitively declined so she will require close reassessment of cognitive function  Overall patient is noted to have declined significantly with profound physical  decline noted since her last TCU stay.  She has continued to be full code.  She is also taking care of her elderly  who has dementia.  I am not sure if she is driving.  I suspect she may need additional services versus looking at placement possibly in assisted living for support to cut down on her hospitalization.  We will have  discussed with family    History     Patient is a very pleasant 76 y.o. female who is admitted to TCU  Patient presented to the hospital with increasing weakness and altered mental status.  She has underlying history of COPD.  sHe is on oxygen.  Unfortunately she ultimately fell on the floor and could not get up and was brought into the hospital  She was admitted with severe sepsis with pyelonephritis and E. coli bacteremia  She was also suspected to have acute pneumonia and these were treated with antibiotics given IV with improvement.  She also was noted to have acute exacerbation of her congestive heart failure.  Echocardiogram shows an EF of 45%.  She was given IV Lasix for diuresis and has been discharged on high doses of oral Lasix.  Her Toprol was also increased.  She developed acute diarrhea felt to be secondary to antibiotics they have recommended continuing Imodium.  A FIB was stable and xarelto was continued. She had some episodes of A. fib with rapid ventricular response in the hospital.  Dosage has been reduced prior to discharge      Past Medical History     Active Ambulatory (Non-Hospital) Problems    Diagnosis     ACP (advance care planning)     Exacerbation of intermittent asthma, unspecified asthma severity     Acute cystitis without hematuria     Factor V Leiden mutation (H)     Chronic anticoagulation     Essential hypertension, benign     Acute systolic congestive heart failure (H)     Pain due to fracture     High risk medication use     Heart failure exacerbated by sotalol (H)     T12 compression fracture (H)     Acute on chronic diastolic  congestive heart failure (H)     Metabolic encephalopathy     Low back pain     Compression fracture of L1 vertebra, sequela     Chronic respiratory failure with hypoxia (H)     Chronic obstructive pulmonary disease, unspecified COPD type (H)     Chronic systolic congestive heart failure (H)     Hypoxia     S/P hip replacement, left     Hip pain, left     Acute hypoxemic respiratory failure (H)     Chest pain     COPD exacerbation (H)     Herpes zoster without complication     Chest wall pain     Benign essential hypertension     Dyslipidemia     Acute respiratory failure with hypoxia (H)     Pulsatile tinnitus of both ears     Hot flashes due to tamoxifen     Pleural nodules     Hot flashes, menopausal     Malignant neoplasm of central portion of left female breast (H)     CKD (chronic kidney disease) stage 3, GFR 30-59 ml/min     Dyspnea     Insomnia     Leg pain, left     Femur fracture (H)     GERD (gastroesophageal reflux disease)     Post menopausal syndrome     OP (osteoporosis)     Hypertension     Pulmonary emphysema, unspecified emphysema type (H)     Hyperlipidemia     Centrilobular emphysema (H)     Anisocoria     OAB (overactive bladder)     Factor 5 Leiden mutation, heterozygous (H)     Family history of blood disease     Bladder incontinence     Physical deconditioning     Cardiomyopathy, idiopathic (H)     Depression with anxiety     Osteoarthritis of hip     Herniated intervertebral disc     Past Medical History:   Diagnosis Date     ANATOLIY (acute kidney injury) (H)      Allergic rhinitis      Arrhythmia      Atrial fibrillation with RVR (H)      Bacteremia      Breast cancer (H) 2017     Cardiomyopathy (H)      Centrilobular emphysema (H)      CHF (congestive heart failure) (H)      CKD (chronic kidney disease)      COPD (chronic obstructive pulmonary disease) (H)      Coronary artery disease      Depression      Dysphagia      E. coli sepsis (H)      Factor 5 Leiden mutation, heterozygous (H)       GERD (gastroesophageal reflux disease)      Hx of radiation therapy 2017     Hyperlipidemia      Hypertension      Hypokalemia      Hypomagnesemia      Idiopathic cardiomyopathy (H)      OAB (overactive bladder)      Osteoporosis      Sacral insufficiency fracture      Sinusitis      Syncope      Urge incontinence      Vocal cord dysfunction        Past Social History     Reviewed, and she  reports that she quit smoking about 13 years ago. She quit smokeless tobacco use about 16 years ago. She reports that she does not drink alcohol or use drugs.    Family History     Reviewed, and family history includes Breast cancer in her maternal aunt; Osteoporosis in an other family member; Prostate cancer in her brother and maternal uncle.    Medication List   Post Discharge Medication Reconciliation Status: discharge medications reconciled, continue medications without change   Current Outpatient Medications on File Prior to Visit   Medication Sig Dispense Refill     acetaminophen (TYLENOL) 500 MG tablet Take 1,000 mg by mouth 3 (three) times a day.       albuterol (PROAIR HFA;PROVENTIL HFA;VENTOLIN HFA) 90 mcg/actuation inhaler Inhale 2 puffs every 4 (four) hours as needed for wheezing. 1 Inhaler 0     ascorbic acid, vitamin C, (VITAMIN C) 1000 MG tablet Take 1,000 mg by mouth daily. Airborne 1 tab daily       azelastine (ASTELIN) 137 mcg (0.1 %) nasal spray 2 sprays into each nostril daily.       azithromycin (ZITHROMAX) 250 MG tablet Take 1 tablet (250 mg total) by mouth at bedtime. 1 tablet 0     benzonatate (TESSALON) 200 MG capsule Take 1 capsule (200 mg total) by mouth 3 (three) times a day as needed for cough. 90 capsule 0     celecoxib (CELEBREX) 200 MG capsule Take 200 mg by mouth 2 (two) times a day.       chlorhexidine (PERIDEX) 0.12 % solution Apply 15 mL to the mouth or throat at bedtime as needed.        cholecalciferol, vitamin D3, 1,000 unit tablet Take 1,000 Units by mouth daily.       diclofenac sodium  (VOLTAREN) 1 % Gel Apply 1 g topically 3 (three) times a day.        EPINEPHrine (EPIPEN/ADRENACLICK/AUVI-Q) 0.3 mg/0.3 mL injection Inject 0.3 mg into the shoulder, thigh, or buttocks.       fluticasone (FLONASE) 50 mcg/actuation nasal spray Apply 1 spray into each nostril 2 (two) times a day as needed for allergies.        fluticasone-umeclidinium-vilanterol (TRELEGY ELLIPTA) 100-62.5-25 mcg DsDv inhaler Inhale 1 Inhalation daily. 1 each 3     furosemide (LASIX) 40 MG tablet Take 1 tablet (40 mg total) by mouth 2 (two) times a day. (Patient taking differently: Take 80 mg by mouth every morning. and 40mg at 2pm) 180 tablet 1     gabapentin (NEURONTIN) 300 MG capsule Take 2 capsules (600 mg total) by mouth 2 (two) times a day. 360 capsule 2     hydrOXYzine pamoate (VISTARIL) 25 MG capsule Take 25 mg by mouth 3 (three) times a day.        ipratropium (ATROVENT HFA) 17 mcg/actuation inhaler Inhale 2 puffs every 6 (six) hours. 3 Inhaler 3     loperamide (IMODIUM) 2 mg capsule Take 4 mg by mouth as needed for diarrhea (Every 3 hours as needed for Diarrhea).       melatonin 3 mg Tab tablet Take 1 tablet (3 mg total) by mouth at bedtime.  0     metoprolol succinate (TOPROL-XL) 50 MG 24 hr tablet Take 100 mg by mouth at bedtime.       MYRBETRIQ 50 mg Tb24 ER tablet TAKE 1 TABLET DAILY 90 tablet 3     pantoprazole (PROTONIX) 40 MG tablet Take 1 tablet (40 mg total) by mouth daily. 90 tablet 4     polyethylene glycol (MIRALAX) 17 gram packet Take 1 packet (17 g total) by mouth daily as needed.  0     rivaroxaban ANTICOAGULANT (XARELTO) 15 mg tablet Take 15 mg by mouth daily with supper.       simvastatin (ZOCOR) 40 MG tablet Take 1 tablet (40 mg total) by mouth at bedtime. 90 tablet 3     sodium chloride (OCEAN) 0.65 % nasal spray 2 sprays into each nostril every 2 (two) hours as needed for congestion.        solifenacin (VESICARE) 5 MG tablet Take 1 tablet (5 mg total) by mouth at bedtime. 90 tablet 3     tamoxifen  "(NOLVADEX) 20 MG tablet TAKE 1 TABLET DAILY 90 tablet 3     traMADoL (ULTRAM) 50 mg tablet Take 1 tablet (50 mg total) by mouth every 6 (six) hours as needed for pain or severe pain (7-10). 30 tablet 0     venlafaxine (EFFEXOR-XR) 150 MG 24 hr capsule TAKE 1 CAPSULE DAILY 90 capsule 3     Current Facility-Administered Medications on File Prior to Visit   Medication Dose Route Frequency Provider Last Rate Last Admin     [DISCONTINUED] GENERIC EXTERNAL MEDICATION     GENERIC EXTERNAL DATA PROVIDER           Allergies     Allergies   Allergen Reactions     Sulfa (Sulfonamide Antibiotics) Rash     Headaches and dizziness.     Homeopathic Drugs Unknown and Other (See Comments)     Comment: runny nose, Comment: runny nose     Mold Extracts      Morphine (Pf)      hallucinate     Lisinopril Cough, Unknown and Itching     Comment: cough, Comment: cough     Sulfacetamide Sodium Rash       Review of Systems   A comprehensive review of 14 systems was done. Pertinent findings noted here and in history of present illness. All the rest negative.  Constitutional: Negative.  Negative for fever, chills, she has  activity change, appetite change and fatigue.   HENT: Negative for congestion and facial swelling.    Eyes: Negative for photophobia, redness and visual disturbance.   Respiratory: Negative for cough and chest tightness.    Cardiovascular: Negative for chest pain, palpitations and has  leg swelling.   Gastrointestinal: Negative for nausea, diarrhea, constipation, blood in stool and abdominal distention.   Genitourinary: Negative.    Musculoskeletal: Negative.  Reports limited endurance  Skin: Negative.    Neurological: Negative for dizziness, tremors, syncope, weakness, light-headedness and headaches.   Hematological: Does not bruise/bleed easily.   Psychiatric/Behavioral: Negative.        Physical Exam     Recent Vitals 3/31/2021   Height 5' 0\"   Weight 142 lbs 6 oz   BSA (m2) 1.65 m2   /62   Pulse -   Temp 98.1 "   Temp src -   SpO2 91   Some recent data might be hidden   on 2l    Constitutional: Oriented to person, place, and time and appears well-developed.   HEENT:  Normocephalic and atraumatic.  Eyes: Conjunctivae and EOM are normal. Pupils are equal, round, and reactive to light. No discharge.  No scleral icterus. Nose normal. Mouth/Throat: Oropharynx is clear and moist. No oropharyngeal exudate.    NECK: Normal range of motion. Neck supple. No JVD present. No tracheal deviation present. No thyromegaly present.   CARDIOVASCULAR: Normal rate, regular rhythm and intact distal pulses.  Exam reveals no gallop and no friction rub.  Systolic murmur present.  PULMONARY: Effort normal and breath sounds normal. No respiratory distress.No Wheezing or rales.  faint rales heard  ABDOMEN: Soft. Bowel sounds are normal. No distension and no mass.  There is no tenderness. There is no rebound and no guarding. No HSM.  MUSCULOSKELETAL: Normal range of motion. 2+ LE edema and no tenderness. Mild kyphosis, HAS RIB tenderness.  LYMPH NODES: Has no cervical, supraclavicular, axillary and groin adenopathy.   NEUROLOGICAL: Alert and oriented to person, place, and time. No cranial nerve deficit.  Normal muscle tone. Coordination normal.   GENITOURINARY: Deferred exam.  SKIN: Skin is warm and dry. No rash noted. No erythema. No pallor.   EXTREMITIES: No cyanosis, no clubbing, 2+le edema. No Deformity.  PSYCHIATRIC: Normal mood, affect and behavior.      Lab Results     Recent Results (from the past 240 hour(s))   COVID-19 VIRUS (CORONAVIRUS) BY PCR - EXTERNAL RESULT    Collection Time: 03/23/21  6:58 PM    Specimen: Nasopharyngeal Swab    Specimen type and source: Swab (Source Required), Nasopharyngeal swab   Result Value Ref Range    COVID-19 Virus by PCR (External Result) Not Detected Not Detected   COVID-19 VIRUS (CORONAVIRUS) BY PCR - EXTERNAL RESULT    Collection Time: 03/30/21 10:27 AM    Specimen: Nasopharyngeal Swab    Specimen type and  source: Swab (Source Required), Nasopharyngeal swab   Result Value Ref Range    COVID-19 Virus by PCR (External Result) Not Detected Not Detected            Imaging Results     Xr Ribs Left W Pa Chest    Result Date: 3/9/2021  EXAM: XR RIBS LEFT W PA CHEST LOCATION: [facility] DATE/TIME: 3/9/2021 2:35 PM INDICATION: Left thoracic pain after trauma COMPARISON: Portable chest 02/24/2021     Heart size and vascularity are normal. Shallow inspiration with with bibasilar subsegmental atelectasis. Old healed eighth through 10th rib fractures redemonstrated. No definite acute rib fracture. No pneumothorax nor pleural effusion.     Xr Ankle Left 3 Or More Vws    Result Date: 3/9/2021  EXAM: XR ANKLE LEFT 3 OR MORE VWS LOCATION: [facility] DATE/TIME: 3/9/2021 2:36 PM INDICATION: left lateral malleolar discomfort after she rolled it 3 days ago COMPARISON: None.     No fracture. Ankle mortise is intact. Mild soft tissue swelling around the ankle.    Xr Foot Left 3 Or More Vws    Result Date: 3/9/2021  EXAM: XR FOOT LEFT 3 OR MORE VWS LOCATION: [facility] DATE/TIME: 3/9/2021 2:38 PM INDICATION: left foot pain after rolled left foot/ankle COMPARISON: None.     No fracture. No degenerative changes. Mild soft tissue swelling overlying the dorsal aspect of the metatarsal shafts.          Electronically signed by  VASHTI Sanchez

## 2021-06-17 ENCOUNTER — RECORDS - HEALTHEAST (OUTPATIENT)
Dept: ADMINISTRATIVE | Facility: OTHER | Age: 76
End: 2021-06-17

## 2021-06-17 NOTE — PROGRESS NOTES
"ACUPUNCTURIST TREATMENT NOTE    Name: Irene León  :  1945  MRN:  [id number]      Acupuncture Treatment  Patient Type: [facility]  Intervention Reason: Quality of Life  Patient complaint:: hot flashes due to medication  Acupuncture (Points):: Du 20, LI 11, Jana 7, LI 4, St 36, Ki 3, Ki 6, Jaki 3  Checklist: Progress Note Completed;Consent Reveiwed   Patient explains that she has been dealing with unbearable hot flashes x1 year.  Pt. Explains that she has difficulty sleeping due to multiple interruptions during the night because of night sweats.  Pt. Explains that hot flashes have a pattern of starting around 4pm and last then throughout the night.  Pt. Denies any correlation of food or activity that causes the hot flashes.  Pt. Denies having any control over managing her symptoms.    30 minute treatment    \"Risks and benefits of acupuncture were discussed with patient. Consent for treatment was given. We thank you for the referral.\"     Kendy Pritchard L.Ac.    Date:  2018  Time:  2:11 PM    "

## 2021-06-17 NOTE — TELEPHONE ENCOUNTER
Get patient evaluated in clinic today with available provider, or go to ER if patient feels acutely ill or significant worsening shortness of breath.

## 2021-06-17 NOTE — PROGRESS NOTES
Assessment & Plan     Chronic obstructive pulmonary disease (H)  I suspect that she is dealing with a mild exacerbation of her underlying COPD today.  I would like to check a chest x-ray today I am going to give her a 5-day course of prednisone and a 10-day course of doxycycline.  Continue on Trelegy.  Nebulizer with DuoNeb treatments 4 times daily.  Continue on baseline oxygen.  Follow-up with pulmonology in July    - predniSONE (DELTASONE) 20 MG tablet  Dispense: 10 tablet; Refill: 0  - doxycycline (VIBRA-TABS) 100 MG tablet  Dispense: 20 tablet; Refill: 0    Essential hypertension  Her blood pressure is too low.  She is having some symptoms consistent with orthostasis at home and so I am going to have her reduce her losartan down further to 12.5 mg daily.  Recheck BMP today.  Follow-up with CHF nurse practitioner next week.    - losartan (COZAAR) 25 MG tablet  Dispense: 90 tablet; Refill: 0  - Basic Metabolic Panel    Personal history of urinary tract infection  History of urosepsis.  She is not classically symptomatic today but would like a urinalysis done to rule out infection.  While hospitalized with urosepsis she never had classic UTI symptoms  - Urinalysis-UC if Indicated    Patient Instructions   I think your breathing issues are due to an exacerbation of your underlying COPD/asthma.    We will check a chest x-ray today    We are going to treat your COPD exacerbation similar to what was done this past March while you were in the hospital.  Prednisone 40 mg daily for 5 days.  Take with food in the morning.  Doxycycline antibiotic: 100 mg tablet twice daily for 10 days.    Reduce your losartan down to 12.5 mg daily.  I think your dizziness is due to low blood pressures.    Follow-up with cardiology clinic as planned.    We need to recheck your kidney labs.    We will recheck your urine today.  If your urine has suspicious findings, we will assume that the doxycycline will cover for possible  "UTI.    Continue with nebulization treatments.  Continue with Trelegy inhaler.  Follow-up with pulmonology clinic later this summer.    No changes to oxygen.  Continue wearing this.    Follow-up with Dr. Darling in June        Prescription drug management  47 minutes spent on the date of the encounter doing chart review, history and exam, documentation and further activities per the note       BMI:   Estimated body mass index is 25.33 kg/m  as calculated from the following:    Height as of this encounter: 4' 11.37\" (1.508 m).    Weight as of this encounter: 127 lb (57.6 kg).     Return in about 1 month (around 6/19/2021).    Aakash Flores, [facility]   Irene León is 76 y.o. and presents today for the following health issues   HPI     Patient comes the clinic today for an acute respiratory problem.  She is also dealing with some significant dizziness at home.    I last saw her for hospital discharge follow-up for urosepsis.  At that time, she informed me that she had restarted her losartan on her own.  Her blood pressure and other vital signs were okay at that point but her BMP at that time showed an acute rise in her creatinine to 1.8, her baseline typically between 1.1 and 1.4.    She has been having some lightheaded dizzy spells at home but no falls.  She has not been checking her blood pressure at home.  Blood pressure today 102/68.    She has a history of Takotsubo syndrome and is on furosemide 80 mg twice daily.  Occasionally, she will skip a dose of her furosemide if she is out in the community.  Her weight has been stable and she does not have any increased lower extremity edema.    She has a history of chronic hypoxic respiratory failure due to her COPD and underlying CHF.  On chronic oxygen, 2 L nasal cannula.  She has been better about wearing her oxygen on a continuous basis.    She does have a follow-up with pulmonology in July but has not seen her " "pulmonologist on a consistent basis.  On Trelegy inhaler.  Doing DuoNeb treatments with her nebulizer 4 times daily.    Her home health nurse called into the clinic saying that the patient had some crackles in her bases.  Oxygenation had been in the low 90s while on oxygen.  Patient felt like she was in mild respiratory distress, above her normal baseline dyspnea.    Patient had been hospitalized this past March for COPD exacerbation and given doxycycline/prednisone and did well with that.    She continues to deny wanting palliative medicine consult.  History of lumbar spine fracture secondary to osteoporosis.     with dementia.    Review of Systems  Negative      Objective    /68 (Patient Site: Left Arm, Patient Position: Sitting, Cuff Size: Adult Regular)   Pulse 72   Ht 4' 11.37\" (1.508 m)   Wt 127 lb (57.6 kg)   LMP 11/02/1992   SpO2 95%   BMI 25.33 kg/m    Body mass index is 25.33 kg/m .  Physical Exam  Patient in wheelchair accompanied by her .  Oxygen in place.  Difficult to auscultate lung bases due to patient's positioning but she does not appear to be in respiratory distress.  Trace edema bilateral lower extremities              "

## 2021-06-17 NOTE — TELEPHONE ENCOUNTER
I called Stephanie and spoke with her about her creatinine.  I explained that her creatinine came back elevated when we checked her lab work yesterday (1.8).    I am going to have her reduce her losartan down to 25 mg.  She will continue on her current dose of furosemide until she follows up with the CHF nurse practitioner later this month.  She can have her lab work and blood pressure rechecked at that time.    She verbalized understanding.  I sent a new prescription for the lower dose of losartan to her [facility] pharmacy.

## 2021-06-17 NOTE — PROGRESS NOTES
[facility] Patient Outreach:     Discharge Date::  4/27/21      Reason for TCU stay (discharge diagnosis)::  Severe sepsis      Are you feeling better, the same or worse since your discharge?:  Patient is feeling better          As part of your discharge plan, did they discuss home care with you?: Yes        Have your seen them yet, or are they scheduled to visit?: Yes                Do you have any follow up visits scheduled with your PCP or Specialist?:  Yes, with Specialist      Who are you seeing and when is it scheduled?:  5/24/21 at 2:20 pm with Sharon at heart clinic      I'm glad to hear you're doing well and we want you to continue to do well. Your PCP would like to see you for a follow-up visit. Can we help set that up for your today?: No        (RN) Provided patient the PCP's phone number to call if they have any questions or concerns?: Yes

## 2021-06-17 NOTE — PROGRESS NOTES
"Post Discharge Medication Reconciliation Status: discharge medications reconciled and changed, per note/orders        Assessment & Plan     Pyelonephritis  She was admitted to the hospital with urosepsis but responded well to IV antibiotics and was discharged to the TCU.  No longer having any fevers.  No dysuria symptoms.    Chronic systolic congestive heart failure (H)  She has follow-up scheduled with cardiology later this month.  There was a discussion about getting a Holter monitor scheduled upon her discharge from [facility] to evaluate for paroxysmal atrial fibrillation as she had a few episodes of atrial fibrillation with RVR during her hospitalization.   She never followed through with getting the Holter monitor scheduled.  The discharging hospitalist seemed to think that she did not need to be on Xarelto.  She has a history of factor V Leiden and says that she has a history of \"mini strokes.\"  She is using her furosemide 40 mg twice daily.  Weights appear to be stable.    Chronic respiratory failure with hypoxia (H)  She feels like her breathing is at its baseline.  Continues on home O2; 2 L.  Continue on Trelegy.  Daily azithromycin.  She had an appointment with her pulmonologist at the end of April but did not go and rescheduled it for later date.    Essential hypertension  She tells me that she restarted her losartan and has been taking it every morning. I do not see the losartan on her discharge medication list from the TCU.  Also, it looks like she is taking 1 tablet of her metoprolol (50 mg), instead of the prescribed 100 mg dose on her discharge paperwork.  Her blood pressure looks excellent today and her heart rate is only 58; so I am not going to make any changes, but we will recheck a BMP.  She continues on furosemide 40 mg twice daily  - Basic Metabolic Panel    Compression fracture of T12 vertebra with delayed healing, subsequent encounter  Pain is tolerable with tramadol.  She never " "followed through with the vertebroplasty last winter.    Factor V Leiden mutation (H)  On Xarelto.  She can have a discussion with her PCP on the merits of continuing on the Xarelto later this summer.    OAB (overactive bladder)  She had her Myrbetriq ER and solifenacin held while in the hospital.  She tells me today that she restarted these medications at home.    Malignant neoplasm of central portion of left breast in female, estrogen receptor positive (H)  Following up with oncology later this spring.  On tamoxifen    Patient Instructions   Make sure that you purchase a new scale so that you can continue checking your weights on a daily basis.  If you notice a significant increase, more than 3 to 5 pounds in 24 hours, call the clinic.    Follow-up with cardiology in May.    Follow-up with Dr. Darling in June.    No changes to COPD medications today.    I am going to let you have a discussion with cardiology as to whether or not we should proceed with a Holter monitor to evaluate for paroxysmal atrial fibrillation.  There was a discussion about discontinuing your Xarelto due to your frequent falls.  You have a history of factor V Leiden, as well as \"mini stroke.\"    Recheck labs today.    Continue wearing oxygen at home.    Continue on furosemide 40 mg twice daily.    We had a discussion about palliative care consult today.  This would be to maximize your quality of life.  We can revisit this discussion again in the future.          Review of external notes as documented in note  40 minutes spent on the date of the encounter doing chart review, history and exam, documentation and further activities per the note       Return in about 1 month (around 6/4/2021).    Aakash Flores [facility]   Irene León is 76 y.o. and presents today for the following health issues   HPI     Patient was admitted to [facility] on 3/23 and discharged 1 week later.    She " presented to the ED with globalized weakness and lethargy having recently fallen.  She was found to be suffering from urosepsis secondary to pyelonephritis.  She was started on IV antibiotics.    While in the ED, she was initially quite tachycardic with heart rates in the 180s.  Hypotensive with systolics in the 60s.  Hypoxic in the 70s.    She was given an amiodarone bolus with improvement in her heart rates.  1 L of IV fluid with improvement of blood pressure.  She was placed on BiPAP.    On admission she was hypovolemic and septic but became hypervolemic following fluid resuscitation for her sepsis.  She was diuresed with IV Lasix.  Transitioned to oral furosemide on discharge.  She had her metoprolol increased to 100 mg daily.  Her losartan was held due to lower blood pressures.    ANATOLIY resolved, thought to be 2/2 sepsis.    Had some diarrhea due to antibiotics.    She continues on a trilogy inhaler at home.    Urinating okay.  Her scale recently broke at home so she has not been able to do daily weights.    Not interested in palliative care.    Review of Systems  Negative      Objective    /72 (Patient Site: Left Arm, Patient Position: Sitting, Cuff Size: Adult Regular)   Pulse (!) 58   Ht 5' (1.524 m)   Wt 127 lb 8 oz (57.8 kg)   LMP 11/02/1992   SpO2 94%   BMI 24.90 kg/m    Body mass index is 24.9 kg/m .  Physical Exam  Patient accompanied by .  She is alert and oriented and under no duress.  O2 nasal cannula in place.  She is in a wheelchair.

## 2021-06-17 NOTE — TELEPHONE ENCOUNTER
RN cannot approve Refill Request    RN can NOT refill this medication historical medication requested. Last office visit: 2/14/2020 Pankaj Montanez MD Last Physical: 1/21/2021 Last MTM visit: Visit date not found Last visit same specialty: 3/11/2020 Aakash Flores CNP.  Next visit within 3 mo: Visit date not found  Next physical within 3 mo: Visit date not found      Mahendra Fitzpatrick, [facility] Connection Triage/Med Refill 5/5/2021    Requested Prescriptions   Pending Prescriptions Disp Refills     ATROVENT HFA 17 mcg/actuation inhaler [Pharmacy Med Name: ATROVENT HFA INHALER 12.9GM 17MCG] 1 Inhaler 3     Sig: USE 2 INHALATIONS EVERY 6 HOURS       Ipratropium/Tiotropium/Umeclidinium Refill Protocol Passed - 5/5/2021  2:40 AM        Passed - PCP or prescribing provider visit in last 6 months     Last office visit with prescriber/PCP: Visit date not found OR same dept: Visit date not found OR same specialty: 3/11/2020 Aakash Flores CNP Last physical: 1/21/2021 Last MTM visit: Visit date not found     Next appt within 3 mo: Visit date not found  Next physical within 3 mo: Visit date not found  Prescriber OR PCP: Pankaj Montanez MD  Last diagnosis associated with med order: There are no diagnoses linked to this encounter.  If protocol passes may refill for 6 months if within 3 months of last provider visit (or a total of 9 months).

## 2021-06-17 NOTE — TELEPHONE ENCOUNTER
Telephone Encounter by Rose Marie Davies RN at 1/26/2021  9:00 AM     Author: Rose Marie Davies RN Service: -- Author Type: Registered Nurse    Filed: 1/26/2021  9:01 AM Encounter Date: 1/25/2021 Status: Signed    : Rose Marie Davies RN (Registered Nurse)       Cancer Care Refill Protocol Passed    Rerun Protocol (1/25/2021 6:13 PM)     Cancer Care visit in the last 12 months     Last office visit: 7/14/2020 Devon Suarez MD Next office visit within 3 mo: Visit date not found  Last MTM visit: Visit date not found    Prescriber or current provider: Devon Suarez MD  Last diagnosis associated with med order:   1. Menopausal syndrome (hot flashes)  - venlafaxine (EFFEXOR-XR) 150 MG 24 hr capsule [Pharmacy Med Name: VENLAFAXINE HCL ER CAPS 150MG]; TAKE 1 CAPSULE DAILY  Dispense: 90 capsule; Refill: 3

## 2021-06-17 NOTE — TELEPHONE ENCOUNTER
Reason for Call:  Home Health Care    Noemí P/T with [facility] called regarding (reason for call): Verbal order request    Orders are needed for this patient. PT    PT: 1x a wk for 1wk, 2x a wk for 3wk for balance, gait training, therapeutic exercise, energy conservation    OT: NA    Skilled Nursing: NA    Pt Provider: Dr. Montanez    Phone Number Homecare Nurse can be reached at: [phone]    Can we leave a detailed message on this number? Yes     Phone number patient can be reached at: Home number on file [phone] (home)    Best Time: anytime    Call taken on 4/29/2021 at 4:30 PM by Shannon Shipman

## 2021-06-17 NOTE — PATIENT INSTRUCTIONS - HE
"Make sure that you purchase a new scale so that you can continue checking your weights on a daily basis.  If you notice a significant increase, more than 3 to 5 pounds in 24 hours, call the clinic.    Follow-up with cardiology in May.    Follow-up with Dr. Darling in June.    No changes to COPD medications today.    I am going to let you have a discussion with cardiology as to whether or not we should proceed with a Holter monitor to evaluate for paroxysmal atrial fibrillation.  There was a discussion about discontinuing your Xarelto due to your frequent falls.  You have a history of factor V Leiden, as well as \"mini stroke.\"    Recheck labs today.    Continue wearing oxygen at home.    Continue on furosemide 40 mg twice daily.    We had a discussion about palliative care consult today.  This would be to maximize your quality of life.  We can revisit this discussion again in the future.  "

## 2021-06-17 NOTE — PATIENT INSTRUCTIONS - HE
1. Check labs today to re-assess kidney function  2. Continue current medications for now  3. Follow up with pulmonary MD  4. Set up echo to re-look at heart function, pulmonary pressures

## 2021-06-17 NOTE — PATIENT INSTRUCTIONS - HE
I think your breathing issues are due to an exacerbation of your underlying COPD/asthma.    We will check a chest x-ray today    We are going to treat your COPD exacerbation similar to what was done this past March while you were in the hospital.  Prednisone 40 mg daily for 5 days.  Take with food in the morning.  Doxycycline antibiotic: 100 mg tablet twice daily for 10 days.    Reduce your losartan down to 12.5 mg daily.  I think your dizziness is due to low blood pressures.    Follow-up with cardiology clinic as planned.    We need to recheck your kidney labs.    We will recheck your urine today.  If your urine has suspicious findings, we will assume that the doxycycline will cover for possible UTI.    Continue with nebulization treatments.  Continue with Trelegy inhaler.  Follow-up with pulmonology clinic later this summer.    No changes to oxygen.  Continue wearing this.    Follow-up with Dr. Darling in June

## 2021-06-17 NOTE — TELEPHONE ENCOUNTER
Called Lori. Pt needs clarification on some meds. Pt would like Voltaren gel and Xolair discontinued as she never uses them.     Need clarification on Vistaril. Pt takes it prn but they have it as 25mg three times a day. Please clarify.    Pt is taking Metoprolol 50mg 1 1/2 tablets per day and they have 50mg two times a day. Please clarify.    Pt takes Furosemide 40mg two times a day and they have 80 mg daily along with 40mg daily. Please clarify.     Pt takes Acetaminophen 1000mg two times a day and they have three times a day. Please clarify.    Thanks.

## 2021-06-17 NOTE — TELEPHONE ENCOUNTER
Patient scheduled tomorrow morning with Cesar. Patient will go to ER if needed.    I also spoke with Lori who has some questions about meds. She requested that I call her back tomorrow. I will keep this on my radar.  Dottie Grove CMA ............... 3:07 PM, 05/18/21

## 2021-06-17 NOTE — TELEPHONE ENCOUNTER
Call patient to discuss recent BMP results.  Creatinine elevated at 2.2, up from 1.8 when last checked.    She is going to stop her losartan completely.  Plan is to follow-up with cardiology next week.

## 2021-06-17 NOTE — TELEPHONE ENCOUNTER
Reason for Call:  Other      Detailed comments: Lori with [facility]   [phone]  Rt lower lobe crackles, shortness of breath, cough unable to raise any mucus of sputum,   Oxygen is between 91-93 % at 2 liters  Having diffuctly completely ADL due to not being able to breathe  /62  Pulse 85  Temp 97.8  Wt 126.2    Please call to advise.    Best Time: any    Call taken on 5/18/2021 at 1:44 PM by Laura L Goldberg

## 2021-06-17 NOTE — TELEPHONE ENCOUNTER
Reason for Call:  Home Health Care    Tong with [facility] called regarding (reason for call): verbal order    Orders are needed for this patient. Extension of PT orders for 2x a week for 3 weeks, to focus on strengthening bilateral legs, gait training, balance, endurance training, and transfers.       Phone Number Homecare Nurse can be reached at: [phone]    Can we leave a detailed message on this number? Yes     Phone number patient can be reached at: Other phone number:  [phone]    Best Time: Any    Call taken on 5/20/2021 at 2:26 PM by Shira Rodriges

## 2021-06-17 NOTE — TELEPHONE ENCOUNTER
Reason for Call:  Other      Detailed comments: Tong with [facility] calling with update  Weight increased 5/7 131.6  up from 5/6  127    Any follow up or recommendations? otherwise just FYI          Call taken on 5/7/2021 at 11:34 AM by Laura L Goldberg

## 2021-06-17 NOTE — PROGRESS NOTES
Clinic Care Coordination Contact  Community Health Worker Initial Outreach    Patient accepts CC: No, Enrolled in home care. Patient will be sent Care Coordination introduction letter for future reference.

## 2021-06-17 NOTE — TELEPHONE ENCOUNTER
Jase per Dr. Montanez. Left message for Tong with [facility]ase for orders.  Dottie Grove CMA ............... 5:05 PM, 05/20/21

## 2021-06-17 NOTE — TELEPHONE ENCOUNTER
Telephone Encounter by Rose Marie Davies RN at 3/8/2021  8:40 AM     Author: Rose Marie Davies RN Service: -- Author Type: Registered Nurse    Filed: 3/8/2021  8:41 AM Encounter Date: 3/6/2021 Status: Signed    : Rose Marie Davies RN (Registered Nurse)       Cancer Care Refill Protocol Passed    Rerun Protocol (3/6/2021 1:01 AM)     Cancer Care visit in the last 12 months     Last office visit: 7/14/2020 Devon Suarez MD Next office visit within 3 mo: Visit date not found  Last MTM visit: Visit date not found    Prescriber or current provider: Devon Suarez MD  Last diagnosis associated with med order:   1. Malignant neoplasm of central portion of left breast in female, estrogen receptor positive (H)  - tamoxifen (NOLVADEX) 20 MG tablet [Pharmacy Med Name: TAMOXIFEN CITRATE TABS 20MG]; TAKE 1 TABLET DAILY  Dispense: 90 tablet; Refill: 3

## 2021-06-17 NOTE — PATIENT INSTRUCTIONS - HE
Patient Instructions by Collette Torres NP at 2/21/2019 10:20 AM     Author: Collette Torres NP Service: -- Author Type: Nurse Practitioner    Filed: 2/21/2019 11:13 AM Encounter Date: 2/21/2019 Status: Signed    : Collette Torres NP (Nurse Practitioner)         Patient Education     Your Health Risk Assessment indicates you feel you are not in good physical health.    A healthy lifestyle helps keep the body fit and the mind alert. It helps protect you from disease, helps you fight disease, and helps prevent chronic disease (disease that doesn't go away) from getting worse. This is important as you get older and begin to notice twinges in muscles and joints and a decline in the strength and stamina you once took for granted. A healthy lifestyle includes good healthcare, good nutrition, weight control, recreation, and regular exercise. Avoid harmful substances and do what you can to keep safe. Another part of a healthy lifestyle is stay mentally active and socially involved.    Good healthcare     Have a wellness visit every year.     If you have new symptoms, let us know right away. Don't wait until the next checkup.     Take medicines exactly as prescribed and keep your medicines in a safe place. Tell us if your medicine causes problems.   Healthy diet and weight control     Eat 3 or 4 small, nutritious, low-fat, high-fiber meals a day. Include a variety of fruits, vegetables, and whole-grain foods.     Make sure you get enough calcium in your diet. Calcium, vitamin D, and exercise help prevent osteoporosis (bone thinning).     If you live alone, try eating with others when you can. That way you get a good meal and have company while you eat it.     Try to keep a healthy weight. If you eat more calories than your body uses for energy, it will be stored as fat and you will gain weight.     Recreation   Recreation is not limited to sports and team events. It includes any activity that provides relaxation,  interest, enjoyment, and exercise. Recreation provides an outlet for physical, mental, and social energy. It can give a sense of worth and achievement. It can help you stay healthy.       Patient Education     Exercise for a Healthier Heart  You may wonder how you can improve the health of your heart. If youre thinking about exercise, youre on the right track. You dont need to become an athlete, but you do need a certain amount of brisk exercise to help strengthen your heart. If you have been diagnosed with a heart condition, your doctor may recommend exercise to help stabilize your condition. To help make exercise a habit, choose safe, fun activities.       Be sure to check with your health care provider before starting an exercise program.    Why exercise?  Exercising regularly offers many healthy rewards. It can help you do all of the following:    Improve your blood cholesterol levels to help prevent further heart trouble    Lower your blood pressure to help prevent a stroke or heart attack    Control diabetes, or reduce your risk of getting this disease    Improve your heart and lung function    Reach and maintain a healthy weight    Make your muscles stronger and more limber so you can stay active    Prevent falls and fractures by slowing the loss of bone mass (osteoporosis)    Manage stress better  Exercise tips  Ease into your routine. Set small goals. Then build on them.  Exercise on most days. Aim for a total of 150 or more minutes of moderate to  vigorous intensity activity each week. Consider 40 minutes, 3 to 4 times a week. For best results, activity should last for 40 minutes on average. It is OK to work up to the 40 minute period over time. Examples of moderate-intensity activity is walking one mile in 15 minutes or 30 to 45 minutes of yard work.  Step up your daily activity level. Along with your exercise program, try being more active throughout the day. Walk instead of drive. Do more household  tasks or yard work.  Choose one or more activities you enjoy. Walking is one of the easiest things you can do. You can also try swimming, riding a bike, or taking an exercise class.  Stop exercising and call your doctor if you:    Have chest pain or feel dizzy or lightheaded    Feel burning, tightness, pressure, or heaviness in your chest, neck, shoulders, back, or arms    Have unusual shortness of breath    Have increased joint or muscle pain    Have palpitations or an irregular heartbeat      7636-6594 [employer]. [address] [place] [address]. All rights reserved. This information is not intended as a substitute for professional medical care. Always follow your healthcare professional's instructions.         Patient Education   Understanding [employer] MyPlate  The USDA (US Department of Agriculture) has guidelines to help you make healthy food choices. These are called MyPlate. MyPlate shows the food groups that make up healthy meals using the image of a place setting. Before you eat, think about the healthiest choices for what to put onto your plate or into your cup or bowl. To learn more about building a healthy plate, visit www.choosemyplate.gov.       The Food Groups    Fruits: Any fruit or 100% fruit juice counts as part of the Fruit Group. Fruits may be fresh, canned, frozen, or dried, and may be whole, cut-up, or pureed. Make half your plate fruits and vegetables.    Vegetables: Any vegetable or 100% vegetable juice counts as a member of the Vegetable Group. Vegetables may be fresh, frozen, canned, or dried. They can be served raw or cooked and may be whole, cut-up, or mashed. Make half your plate fruits and vegetables.     Grains: All foods made from grains are part of the Grains Group. These include wheat, rice, oats, cornmeal, and barley such as bread, pasta, oatmeal, cereal, tortillas, and grits. Grains should be no more than a quarter of your plate. At least half of your grains  should be whole grains.    Protein: This group includes meat, poultry, seafood, beans and peas, eggs, processed soy products (like tofu), nuts (including nut butters), and seeds. Make protein choices no more than a quarter of your plate. Meat and poultry choices should be lean or low fat.    Dairy: All fluid milk products and foods made from milk that contain calcium, like yogurt and cheese are part of the Dairy Group. (Foods that have little calcium, such as cream, butter, and cream cheese, are not part of the group.) Most dairy choices should be low-fat or fat-free.    Oils: These are fats that are liquid at room temperature. They include canola, corn, olive, soybean, and sunflower oil. Foods that are mainly oil include mayonnaise, certain salad dressings, and soft margarines. You should have only 5 to 7 teaspoons of oils a day. You probably already get this much from the food you eat.  Use [employer] to Help Build Your Meals  The SuperTracker can help you plan and track your meals and activity. You can look up individual foods to see or compare their nutritional value. You can get guidelines for what and how much you should eat. You can compare your food choices. And you can assess personal physical activities and see ways you can improve. Go to www.[employer].gov/supertracker/.    5399-8712 [employer]. [address] [place] [address], [place] [address]. All rights reserved. This information is not intended as a substitute for professional medical care. Always follow your healthcare professional's instructions.           Patient Education   Activities of Daily Living  Your Health Risk Assessment indicates you have difficulties with activities of daily living such as eating, getting dressed, grooming, bathing, walking, or using the toilet. Please make a follow up appointment for us to address this issue in more detail.     Patient Education   Instrumental Activities of Daily Living  Your Health Risk  Assessment indicates you have difficulties with instrumental activities of daily living which include laundry, housekeeping, banking, shopping, using the telephone, food preparation, transportation, or taking your own medications. Please make a follow up appointment for us to address this issue in more detail.    [employer] has resources available on the following website: https://www.[employer].org/caregivers.html     Also, here is a local agency that provides help with meals and other assistance:   [facility] Line: [phone]     Patient Education   Urinary Incontinence, Female (Adult)  Urinary incontinence means loss of control of the bladder. This problem affects many women, especially as they get older. If you have incontinence, you may be embarrassed to ask for help. But know that this problem can be treated.  Types of Incontinence  There are different types of incontinence. Two of the main types are described here. You can have more than one type.    Stress incontinence. With this type, urine leaks when pressure (stress) is put on the bladder. This may happen when you cough, sneeze, or laugh. Stress incontinence most often occurs because the pelvic floor muscles that support the bladder and urethra are weak. This can happen after pregnancy and vaginal childbirth or a hysterectomy. It can also be due to excess body weight or hormone changes.    Urge incontinence (also called overactive bladder). With this type, a sudden urge to urinate is felt often. This may happen even though there may not be much urine in the bladder. The need to urinate often during the night is common. Urge incontinence most often occurs because of bladder spasms. This may be due to bladder irritation or infection. Damage to bladder nerves or pelvic muscles, constipation, and certain medicines can also lead to urge incontinence.  Treatment of urinary incontinence depends on the cause. Further evaluation is needed to find the type  you have. This will likely include an exam and certain tests. Based on the results, you and your healthcare provider can then plan treatment. Until a diagnosis is made, the home care tips below can help relieve symptoms.  Home care    Do pelvic floor muscle exercises, if they are prescribed. The pelvic floor muscles help support the bladder and urethra. Many women find that their symptoms improve when doing special exercises that strengthen these muscles. To do the exercises contract the muscles you would use to stop your stream of urine, but do this when youre not urinating. Hold for 10 seconds, then relax. Repeat 10 to 20 times in a row, at least 3 times a day. Your provider may give you other instructions for how to do the exercises and how often.    Keep a bladder diary. This helps track how often and how much you urinate over a set period of time. Bring this diary with you to your next visit with the provider. The information can help your provider learn more about your bladder problem.    Lose weight, if advised to by your provider. Excess weight puts pressure on the bladder. Your provider can help you create a weight-loss plan thats right for you. This may include exercising more and making certain diet changes.    Don't consume foods and drinks that may irritate the bladder. These can include alcohol and caffeinated drinks.    Quit smoking. Smoking and other tobacco use can lead to chronic cough that strains the pelvic floor muscles. Smoking may also damage the bladder and urethra. Talk with your provider about treatments or methods you can use to quit smoking.    If drinking large amounts of fluid causes you to have symptoms, you may be advised to limit your fluid intake. You may also be advised to drink most of your fluids during the day and to limit fluids at night.    If youre worried about urine leakage or accidents, you may wear absorbent pads to catch urine. Change the pads often. This helps reduce  discomfort. It may also reduce the risk of skin or bladder infections.  Follow-up care  Follow up with your healthcare provider, or as directed. It may take some to find the right treatment for your problem. Your treatment plan may include special therapies or medicines. Certain procedures or surgery may also be options. Be sure to discuss any questions you have with your provider.  When to seek medical advice  Call the healthcare provider right away if any of these occur:    Fever of 100.4 F (38 C) or higher, or as directed by your provider    Bladder pain or fullness    Abdominal swelling    Nausea or vomiting    Back pain    Weakness, dizziness or fainting  Date Last Reviewed: 10/1/2017    [id number] [employer]. [address] [place] [address], [place] [address]. All rights reserved. This information is not intended as a substitute for professional medical care. Always follow your healthcare professional's instructions.     Patient Education   Personalized Prevention Plan  You are due for the preventive services outlined below.  Your care team is available to assist you in scheduling these services.  If you have already completed any of these items, please share that information with your care team to update in your medical record.  Health Maintenance   Topic Date Due   ? DEPRESSION FOLLOW UP  1945   ? ADVANCE DIRECTIVES DISCUSSED WITH PATIENT  02/03/1963   ? COLONOSCOPY  02/03/1995   ? DXA SCAN  02/03/2010   ? ZOSTER VACCINES (2 of 3) 12/15/2014   ? FALL RISK ASSESSMENT  02/21/2020   ? MAMMOGRAM  11/02/2020   ? TD 18+ HE  02/12/2026   ? PNEUMOCOCCAL POLYSACCHARIDE VACCINE AGE 65 AND OVER  Completed   ? INFLUENZA VACCINE RULE BASED  Completed   ? PNEUMOCOCCAL CONJUGATE VACCINE FOR ADULTS (PCV13 OR PREVNAR)  Completed

## 2021-06-17 NOTE — TELEPHONE ENCOUNTER
Jorge per Dr. Montanez. Noemí from [facility] notified.  Dottie Grove CMA ............... 5:05 PM, 04/29/21

## 2021-06-17 NOTE — TELEPHONE ENCOUNTER
[facility]-     Would like a Verbal order for skilled nursing for 1x a wk for 4wks for respiratory assessment.     Verbal order to discharge her home health aide, pt doesn't want shower assistant.     Please call [phone] to leave a message.

## 2021-06-18 NOTE — PROGRESS NOTES
"ACUPUNCTURIST TREATMENT NOTE    Name: Irene León  :  1945  MRN:  [id number]      Acupuncture Treatment  Patient Type: WW CCC  Intervention Reason: Pain;Quality of Life  Pre-session Pain Ratin  Post-session Pain Ratin  Pre Quality of Life: 8  Post Quality of Life: 8  Patient complaint:: hot flashes d/t medication; (L) upper leg pain  Acupuncture (Points):: Du 20, Yin Marcial, GB 20, GB 21, LI 11, Jana 7, TW 5, Ling Ku, Da Jackie, GB 34, Jaki 8, Sp 6, Jaki 3, Ki 6, GB 41  Sade: surround dragon at scar on (L) leg  Practitioner Observed: 30 minute treatment  Checklist: Progress Note Completed;Consent Reveiwed    \"Risks and benefits of acupuncture were discussed with patient. Consent for treatment was given. We thank you for the referral.\"     Kendy Pritchard L.Ac.    Date:  2018  Time:  11:39 AM    "

## 2021-06-18 NOTE — PROGRESS NOTES
"ACUPUNCTURIST TREATMENT NOTE    Name: Irene León  :  1945  MRN:  [id number]      Acupuncture Treatment  Patient Type: WW CCC  Intervention Reason: Pain;Quality of Life  Pre-session Pain Ratin  Post-session Pain Rating: 3  Patient complaint:: hot flashes d/t medication; (L) hip and upper leg pain d/t preveious surgeries  Acupuncture (Points):: Du 20, LI 4, Jana 7, LI 11, St 36, GB 34, Sp 6, Ki 3, Jaki 3, GB 31, (L) (Sp 10, GB 33)  Sade: surround dragon at scar on (L) leg; scalp x3  Checklist: Progress Note Completed;Consent Reveiwed   30 minute treatment    \"Risks and benefits of acupuncture were discussed with patient. Consent for treatment was given. We thank you for the referral.\"     Kendy Pritchard L.Ac.    Date:  2018  Time:  12:14 PM    "

## 2021-06-18 NOTE — PROGRESS NOTES
"ACUPUNCTURIST TREATMENT NOTE    Name: Irene León  :  1945  MRN:  [id number]      Acupuncture Treatment  Patient Type: WW CCC  Intervention Reason: Pain;Quality of Life  Pre-session Pain Ratin  Post-session Pain Ratin  Patient complaint:: hot flashes d/t medication; pain in (L) leg and (L) hip  Acupuncture (Points):: Du 20, LI 11, LI 4, Jana 7, St 36, GB 34, GB 31, Sp 6, Ki 3, Jaki 3  Sade: (L) scalp x3; (L) leg surround dragon at scar x 11  Practitioner Observed: 30 minute treatment  Checklist: Progress Note Completed;Consent Reveiwed  Follow up comments: Pt. stated that she was freezing at end of tx today and she was shivering    \"Risks and benefits of acupuncture were discussed with patient. Consent for treatment was given. We thank you for the referral.\"     Kendy Pritchard L.Ac.    Date:  2018  Time:  2:38 PM    "

## 2021-06-18 NOTE — PROGRESS NOTES
"ACUPUNCTURIST TREATMENT NOTE    Name: Irene León  :  1945  MRN:  [id number]      Acupuncture Treatment  Patient Type: WW CCC  Intervention Reason: Pain;Quality of Life  Pre-session Pain Ratin  Post-session Pain Ratin  Patient complaint:: hot flashes due to medication; (L) hip and (L) upper leg pain d/t previous surgeries  Acupuncture (Points):: Du 20, LI 11, Ht 7, LI 4, GB 34, ST 36, Ki 7, Sp 6, Jaki 3  Haider: (L) scalp x3, haider on upper (L) leg x10 at scar; (R) forearm x1  Checklist: Progress Note Completed;Consent Reveiwed   30 minute treatment    \"Risks and benefits of acupuncture were discussed with patient. Consent for treatment was given. We thank you for the referral.\"     Kendy Pritchard L.Ac.    Date:  5/15/2018  Time:  2:38 PM    "

## 2021-06-18 NOTE — PROGRESS NOTES
"ACUPUNCTURIST TREATMENT NOTE    Name: rIene León  :  1945  MRN:  [id number]      Acupuncture Treatment  Patient Type: WW CCC  Intervention Reason: Pain;Quality of Life  Pre-session Pain Ratin  Post-session Pain Ratin  Pre Quality of Life: 4  Post Quality of Life: 4  Patient complaint:: hot flashes d/t medication; pain in upper (L) leg at surgical scar and (L) hip  Acupuncture (Points):: Du 20, SSC, LI 11, LI 4, Jana 7, St 36, Sp 9, Sp 6, Ki 6, Ki 3, Jaik 3, GB 31, GB 34,  Sade: surround dragon on (L) leg at surgical scar  Practitioner Observed: 30 minute treatment  Checklist: Progress Note Completed;Consent Reveiwed    \"Risks and benefits of acupuncture were discussed with patient. Consent for treatment was given. We thank you for the referral.\"     Kendy Pritchard L.Ac.    Date:  2018  Time:  1:55 PM    "

## 2021-06-19 NOTE — LETTER
Letter by Pankaj Montanez MD at      Author: Pankaj Montanez MD Service: -- Author Type: --    Filed:  Encounter Date: 10/16/2019 Status: Signed         Irene León  [address] [facility] [address]             October 16, 2019         Dear Ms. León,    Below are the results from your recent visit:    Resulted Orders   Alkaline Phosphatase   Result Value Ref Range    Alkaline Phosphatase 52 45 - 120 U/L   Calcium   Result Value Ref Range    Calcium 9.3 8.5 - 10.5 mg/dL   Parathyroid Hormone Intact   Result Value Ref Range    PTH 71 10 - 86 pg/mL   Vitamin D, Total (25-Hydroxy)   Result Value Ref Range    Vitamin D, Total (25-Hydroxy) 47.6 30.0 - 80.0 ng/mL    Narrative    Deficiency <10.0 ng/mL  Insufficiency 10.0-29.9 ng/mL  Sufficiency 30.0-80.0 ng/mL  Toxicity (possible) >100.0 ng/mL       Osteoporosis evaluation labs are normal.    Please call with questions or contact us using [employer].    Sincerely,        Electronically signed by Pankaj Montanez MD

## 2021-06-19 NOTE — PROGRESS NOTES
Stephanie is here for follow up and is distressed with the hot flashes from Tamoxifen. She reports distress from it. [place]

## 2021-06-19 NOTE — PROGRESS NOTES
"I was able to meet with Stephanie to check in and offer support.  She had met with Dr. Suarez and voiced concern about her fear of recurrence.  She has friends whose cancers have come back and are now stage 4 and this worries her.  I provided her with a handout from [facility] \"Fear of Recurrence\".  I talked to her about setting up an appointment with Em Felix to help her with this fear.  We talked about attending our  Breast cancer support group.  She feels that it is a control issue for her--she cannot control if her cancer comes back.  I encouraged her to get help and support and that way she can control how she trey with her fear of recurrence.  Support and encouragement and reassurance provided.  Invited calls.    "

## 2021-06-19 NOTE — LETTER
Letter by Aakash Flores CNP at      Author: Aakash Flores CNP Service: -- Author Type: --    Filed:  Encounter Date: 11/20/2019 Status: Signed         Irene León  [address] [facility] [address]             November 20, 2019         Dear Ms. León,    Below are the results from your recent visit:    Resulted Orders   Basic Metabolic Panel   Result Value Ref Range    Sodium 143 136 - 145 mmol/L    Potassium 5.1 (H) 3.5 - 5.0 mmol/L    Chloride 107 98 - 107 mmol/L    CO2 22 22 - 31 mmol/L    Anion Gap, Calculation 14 5 - 18 mmol/L    Glucose 90 70 - 125 mg/dL    Calcium 9.9 8.5 - 10.5 mg/dL    BUN 13 8 - 28 mg/dL    Creatinine 1.23 (H) 0.60 - 1.10 mg/dL    GFR MDRD Af Amer 52 (L) >60 mL/min/1.73m2    GFR MDRD Non Af Amer 43 (L) >60 mL/min/1.73m2    Narrative    Fasting Glucose reference range is 70-99 mg/dL per  American Diabetes Association (ADA) guidelines.       Kidney labs and electrolytes are close to baseline.  Potassium mildly elevated.    No change in treatment plan for now.    Please call with questions or contact us using [employer].    Sincerely,        Electronically signed by Aakash Flores CNP

## 2021-06-19 NOTE — PROGRESS NOTES
Health system Hematology and Oncology Progress Note    Patient: Irene León  MRN: [id number]  Date of Service: 7/12/2018        Reason for Visit    Chief Complaint   Patient presents with     HE Cancer     Breast Cancer       Assessment and Plan  Malignant neoplasm of central portion of left female breast (H)    Staging form: Breast, AJCC 7th Edition    - Pathologic stage from 7/26/2017: Stage IA (T1b, N0, cM0) - Signed by Devon Suarez MD on 8/1/2017          ER Status: Positive          ID Status: Positive          HER2 Status: Negative    ECOG Performance   ECOG Performance Status: 0     Distress Assessment       Pain  Currently in Pain: No/denies    # Stage IA (pT1b, pN0 (sn),cM0) invasive ductal carcinoma of the left breast, grade 1, associated DCIS, ER/ID positive, HER-2 negative, right breast atypical ductal hyperplasia.  S/P right breast lumpectomy, left breast lumpectomy and left axillary sentinel lymph node biopsy on 6/20/2017.  She has several comorbidities including nonischemic cardiomyopathy (EF 40% in 3/17), osteoporosis on treatment with oral bisphosphonate, factor V Leiden mutation (details unknown).   There is no clinical or mammographic evidence of breast cancer recurrence.  She has significant vasomotor side effects with hot flashes, fatigue from tamoxifen.  We decreased the tamoxifen dose to 10 mg daily and did not help her.  She wants to increase tamoxifen back to 20 mg daily to achieve the maximal therapeutic effect.  I agree with that.   I reassured her that she has a lower risk of breast cancer recurrence based on her tumor biology and Oncotype result, even though we cannot tell her that she does not have any risk of recurrence.  She understands the effects, however she has a hard time accepting that.  I offered her psychotherapy and she does not believe in that.  We also gave her some resources for that.   Mammogram due in May 2019.   Follow-up with me in 4 months.    #.  Hot  flushes   Stop gabapentin as it does not helping and she cannot tolerate dose adjustment.  She is taking a very small dose but she is advised to taper off to avoid rebound side effects.   She has been sort researching about clonidine or Effexor.  We decided to start with Effexor 37.5 mg daily.  She is advised to send me a note in about a month to see how Effexor is effective or not.    #.  Right lower extremity pain and swelling.   Requested venous duplex ultrasound of the right lower extremity.  Will call with the result.     #.  Osteoporosis  #.  COPD  #.  Idiopathic cardiomyopathy-improving cardiac function.     Problem List    1. Menopausal syndrome (hot flashes)  venlafaxine (EFFEXOR XR) 37.5 MG 24 hr capsule   2. Right leg swelling  US Venous Leg Right      ______________________________________________________________________________    Diagnosis  6/20/17  Stage IA (pT1b, pN0(sn), cM0) invasive ductal carcinoma of the left breast  - ER (high positive, 98%), MA (high positive, 97%) HER2 (negative, score of 1+ by IHC)  - [place] grade 1  - Tumor size: <1 cm  - Margin-negative on final margin with reexcision for invasive carcinoma but close margin for DCIS of 0.2 cm from new medial margin.    - Angiolymphatic invasion present at the inferior and original medial margin.  - 1 sentinel lymph node removed, negative for carcinoma  - associated DCIS  - Right breast atypical ductal hyperplasia.    - Oncotype DX recurrence score 6 : 10 year risk of recurrence with 5 year of tamoxifen is 5%.    Therapy to date:  6/20/17 -right breast lumpectomy AND left breast lumpectomy, left axillary sentinel lymph node biopsy  6/30/17-reexcision lumpectomy of the left breast  9/8/17-completed adjuvant radiation to the left breast 4256 cGy/16  12/6/17-initiated adjuvant endocrine therapy with tamoxifen.    History of Present Illness    Stephanie is here by herself today.      She reported ongoing hot flashes and they are quite  bothersome for her.  She is also having so much stress from seeing some of the people who she knows had recurrence of breast cancer.  She does not feel gabapentin is helping her hot flashes at all.  She takes 300 mg at bedtime only.  She cannot increase the dose because it made her very drowsy.      Pain Status  Currently in Pain: No/denies    Review of Systems    Constitutional  Constitutional (WDL): Exceptions to WDL  Fatigue: None  Fever: None  Chills: None  Weight Gain: None  Weight Loss: None  Neurosensory  Neurosensory (WDL): All neurosensory elements are within defined limits  Peripheral Motor Neuropathy: None  Ataxia: None  Peripheral Sensory Neuropathy: None  Confusion: None  Syncope: None  Eye   Eye Disorder (WDL): All eye disorder elements are within defined limits  Ear  Ear Disorder (WDL): All ear disorder elements are within defined limits  Cardiovascular  Cardiovascular (WDL): All cardiovascular elements are within defined limits  Pulmonary  Respiratory (WDL): Exceptions to WDL  Cough: None  Dyspnea: Shortness of breath with moderate exertion (hx of COPD)  Hypoxia: None  Gastrointestinal  Gastrointestinal (WDL): All gastrointestinal elements are within defined limits  Anorexia: None  Constipation: None  Diarrhea: None  Dysphagia: None  Esophagitis: None  Nausea: None  Pharyngitis: None  Vomiting: None  Dysgeusia: None  Dry Mouth: None  Genitourinary  Genitourinary (WDL): All genitourinary elements are within defined limits (inc)  Lymphatic  Lymph (WDL): All lymph disorder elements are within defined limits  Musculoskeletal and Connective Tissue  Musculoskeletal and Connetive Tissue Disorders (WDL): All Musculoskeletal and Connetive Tissue Disorder elements are within defined limits  Integumentary  Integumentary (WDL): All integumentary elements are within defined limits (bruising)  Patient Coping  Patient Coping: Accepting  Distress Assessment     Accompanied by  Accompanied by: Alone  Oral Chemo  "Adherence       Past History  Past Medical History:   Diagnosis Date     Arrhythmia      Breast cancer (H)      CHF (congestive heart failure) (H)      COPD (chronic obstructive pulmonary disease) (H)      Coronary artery disease      GERD (gastroesophageal reflux disease)      Hyperlipidemia      Hypertension      Idiopathic cardiomyopathy (H)        Physical Exam    Recent Vitals 7/12/2018   Height 5' 0\"   Weight 148 lbs 14 oz   BSA (m2) 1.69 m2   /99   Pulse 63   Temp -   Temp src -   SpO2 95   Some recent data might be hidden     General: alert, awake, not in acute distress  HEENT: Head: Normal, normocephalic, atraumatic.  Eye: Normal external eye, conjunctiva, lids cornea, CATINA.  Ears: Non-tender.  Nose: Normal external nose, mucus membranes and septum.  Pharynx: Dental Hygiene adequate. Normal buccal mucosa. Normal pharynx.  Neck / Thyroid: Supple, no masses, nodes, nodules or enlargement.  Lymphatics: No abnormally enlarged lymph nodes.  Chest: Normal chest wall and respirations. Clear to auscultation.  Breasts: No palpable breast masses.  No axillary lymphadenopathy.    Heart: S1 S2 RRR, no murmur.   Abdomen: abdomen is soft without significant tenderness, masses, organomegaly or guarding  Extremities: normal strength, tone, and muscle mass.  Right lower extremity is more swollen than the left and tenderness in the right calf.  She reported that right leg is swollen at her baseline but pain is new.  Skin: normal. no rash or abnormalities  CNS: non focal.      Lab Results    No results found for this or any previous visit (from the past 168 hour(s)).    Imaging    No results found.      Signed by: Devon Suarez MD  "

## 2021-06-20 NOTE — LETTER
Letter by Pankaj Monatnez MD at      Author: Pankaj Montanez MD Service: -- Author Type: --    Filed:  Encounter Date: 1/2/2020 Status: Signed         Irene León  [address] [facility] [address]             January 2, 2020         Dear Ms. León,    Below are the results from your recent visit:    Resulted Orders   Basic Metabolic Panel   Result Value Ref Range    Sodium 143 136 - 145 mmol/L    Potassium 3.8 3.5 - 5.0 mmol/L    Chloride 104 98 - 107 mmol/L    CO2 25 22 - 31 mmol/L    Anion Gap, Calculation 14 5 - 18 mmol/L    Glucose 85 70 - 125 mg/dL    Calcium 9.5 8.5 - 10.5 mg/dL    BUN 13 8 - 28 mg/dL    Creatinine 1.34 (H) 0.60 - 1.10 mg/dL    GFR MDRD Af Amer 47 (L) >60 mL/min/1.73m2    GFR MDRD Non Af Amer 39 (L) >60 mL/min/1.73m2    Narrative    Fasting Glucose reference range is 70-99 mg/dL per  American Diabetes Association (ADA) guidelines.       Creatinine level is similar to previous, and within your baseline range.  Potassium level is normal.    Increase losartan to 50 mg a day as planned.  Recheck creatinine and potassium level at January 15 appointment.    Please call with questions or contact us using [employer].    Sincerely,        Electronically signed by Pankaj Montanez MD

## 2021-06-20 NOTE — LETTER
Letter by Devon Suarez MD at      Author: Devon Suarez MD Service: -- Author Type: --    Filed:  Encounter Date: 5/12/2020 Status: (Other)                   Office Hours: Monday - Friday 8:00 - 4:30PM    Irene León  [address] [address]           May 12, 2020      Dear Irene:    It looks as though you are due to see Dr. Suarez in July. If you would like to schedule this please call [phone]         Sincerely,        [facility]

## 2021-06-21 NOTE — LETTER
Letter by Paola Rose MBBS at      Author: Paola Rose MBBS Service: -- Author Type: --    Filed:  Encounter Date: 4/22/2021 Status: (Other)         [facility]  [address] [facility] [address]                                  April 22, 2021    Patient: Irene León   MR Number: [id number]   YOB: 1945   Date of Visit: 4/22/2021     Dear Dr. Pérez:    Thank you for referring Irene León to me for evaluation. Below are the relevant portions of my assessment and plan of care.    If you have questions, please do not hesitate to call me. I look forward to following Irene along with you.    Sincerely,        VASHTI Sanchez          CC  No Recipients  Paola Rose MBBS  4/22/2021  2:05 PM  [facility] note      Patient: Irene León  MRN: [id number]      [facility] [[id number]]  Reason for Visit     Chief Complaint   Patient presents with   ? Review Of Multiple Medical Conditions   Follow-up HYPOTENSION AND PAIN    Code Status     Full code    Assessment       Severe sepsis with pyelonephritis  E. coli bacteremia  Possible pneumonia currently improved after antibiotic usage  COPD with chronic hypoxic respiratory failure on 2 L of oxygen at baseline  Chronic congestive heart failure with reduced ejection fraction with acute exacerbation  ANATOLIY  Electrolyte abnormalities with low potassium and magnesium  Atrial fibrillation with rapid ventricular response noted in the hospital  History of depression anxiety.  Insomnia.  History of falls.  Hypotension  Acute encephalopathy secondary to sepsis  Generalized weakness  Recurrent hospitalizations- 3 hospital admission noted in last  6 weeks including 1 ICU admission  FTT    Plan     Patient has been discharged to the TCU.  She was seen today for follow-up.  Weights are stable at 130 pounds with minimal lymphedema noted on exam today.  She also has significant hypoxic respiratory failure and COPD  and remains oxygen dependent.  She is happy with her albuterol nebs.  Nephrotoxins have been discontinued and recheck labs did show improvement with kidney functions been stable with a creatinine of 1.3  She has A. fib and heart rates have been stable she is on Xarelto for anticoagulation  Continue with her PT and OT      History     Patient is a very pleasant 76 y.o. female who is admitted to TCU  Patient presented to the hospital with increasing weakness and altered mental status.  She has underlying history of COPD.  sHe is on oxygen.  She remains compliant.  Her pulmonary toileting has been improved with scheduled nebulizers and incentive spirometry with significant improvement.  She was admitted with severe sepsis with pyelonephritis and E. coli bacteremia  She was also suspected to have acute pneumonia and these were treated with antibiotics given IV with improvement.  She has been afebrile  Continues to have intermittent lung congestion with coughing but has clear phlegm so far  She also was noted to have acute exacerbation of her congestive heart failure.  Echocardiogram shows an EF of 45%.  She was given IV Lasix for diuresis and has been discharged on high doses of oral Lasix.  Her Toprol was also increased.  Weights are stable at 130 pounds with no worsening lymphedema noted on exam today  She developed acute diarrhea felt to be secondary to antibiotics they have recommended continuing Imodium.  A FIB was stable and xarelto was continued. She had some episodes of A. fib with rapid ventricular response in the hospital.  Dosage has been reduced prior to discharge  Heart rates have been stable  Recheck labs did show an improvement in her kidney functions      Past Medical History     Active Ambulatory (Non-Hospital) Problems    Diagnosis   ? At high risk for impaired skin integrity   ? ACP (advance care planning)   ? Exacerbation of intermittent asthma, unspecified asthma severity   ? Acute cystitis without  hematuria   ? Factor V Leiden mutation (H)   ? Chronic anticoagulation   ? Essential hypertension, benign   ? Acute systolic congestive heart failure (H)   ? Pain due to fracture   ? High risk medication use   ? Heart failure exacerbated by sotalol (H)   ? T12 compression fracture (H)   ? Acute on chronic diastolic congestive heart failure (H)   ? Metabolic encephalopathy   ? Low back pain   ? Compression fracture of L1 vertebra, sequela   ? Chronic respiratory failure with hypoxia (H)   ? Chronic obstructive pulmonary disease, unspecified COPD type (H)   ? Chronic systolic congestive heart failure (H)   ? Hypoxia   ? S/P hip replacement, left   ? Hip pain, left   ? Acute hypoxemic respiratory failure (H)   ? Chest pain   ? COPD exacerbation (H)   ? Herpes zoster without complication   ? Chest wall pain   ? Benign essential hypertension   ? Dyslipidemia   ? Acute respiratory failure with hypoxia (H)   ? Pulsatile tinnitus of both ears   ? Hot flashes due to tamoxifen   ? Pleural nodules   ? Hot flashes, menopausal   ? Malignant neoplasm of central portion of left female breast (H)   ? CKD (chronic kidney disease) stage 3, GFR 30-59 ml/min   ? Dyspnea   ? Insomnia   ? Leg pain, left   ? Femur fracture (H)   ? GERD (gastroesophageal reflux disease)   ? Post menopausal syndrome   ? OP (osteoporosis)   ? Hypertension   ? Pulmonary emphysema, unspecified emphysema type (H)   ? Hyperlipidemia   ? Centrilobular emphysema (H)   ? Anisocoria   ? OAB (overactive bladder)   ? Factor 5 Leiden mutation, heterozygous (H)   ? Family history of blood disease   ? Bladder incontinence   ? Physical deconditioning   ? Cardiomyopathy, idiopathic (H)   ? Depression with anxiety   ? Osteoarthritis of hip   ? Herniated intervertebral disc     Past Medical History:   Diagnosis Date   ? ANATOLIY (acute kidney injury) (H)    ? Allergic rhinitis    ? Arrhythmia    ? Atrial fibrillation with RVR (H)    ? Bacteremia    ? Breast cancer (H) 2017   ?  Cardiomyopathy (H)    ? Centrilobular emphysema (H)    ? CHF (congestive heart failure) (H)    ? CKD (chronic kidney disease)    ? COPD (chronic obstructive pulmonary disease) (H)    ? Coronary artery disease    ? Depression    ? Dysphagia    ? E. coli sepsis (H)    ? Factor 5 Leiden mutation, heterozygous (H)    ? GERD (gastroesophageal reflux disease)    ? Hx of radiation therapy 2017   ? Hyperlipidemia    ? Hypertension    ? Hypokalemia    ? Hypomagnesemia    ? Idiopathic cardiomyopathy (H)    ? OAB (overactive bladder)    ? Osteoporosis    ? Sacral insufficiency fracture    ? Sinusitis    ? Syncope    ? Urge incontinence    ? Vocal cord dysfunction        Past Social History     Reviewed, and she  reports that she quit smoking about 13 years ago. She quit smokeless tobacco use about 16 years ago. She reports that she does not drink alcohol or use drugs.    Family History     Reviewed, and family history includes Breast cancer in her maternal aunt; Osteoporosis in an other family member; Prostate cancer in her brother and maternal uncle.    Medication List   Post Discharge Medication Reconciliation Status: discharge medications reconciled, continue medications without change   Current Outpatient Medications on File Prior to Visit   Medication Sig Dispense Refill   ? acetaminophen (TYLENOL) 500 MG tablet Take 1,000 mg by mouth 3 (three) times a day.     ? albuterol (PROAIR HFA;PROVENTIL HFA;VENTOLIN HFA) 90 mcg/actuation inhaler Inhale 2 puffs every 4 (four) hours as needed for wheezing. 1 Inhaler 0   ? ascorbic acid, vitamin C, (VITAMIN C) 1000 MG tablet Take 1,000 mg by mouth daily. Airborne 1 tab daily     ? azelastine (ASTELIN) 137 mcg (0.1 %) nasal spray 2 sprays into each nostril daily.     ? azithromycin (ZITHROMAX) 250 MG tablet Take 1 tablet (250 mg total) by mouth at bedtime. 1 tablet 0   ? benzonatate (TESSALON) 200 MG capsule Take 1 capsule (200 mg total) by mouth 3 (three) times a day as needed for  cough. 90 capsule 0   ? chlorhexidine (PERIDEX) 0.12 % solution Apply 15 mL to the mouth or throat at bedtime as needed.      ? cholecalciferol, vitamin D3, 1,000 unit tablet Take 1,000 Units by mouth daily.     ? diclofenac sodium (VOLTAREN) 1 % Gel Apply 1 g topically 3 (three) times a day.      ? EPINEPHrine (EPIPEN/ADRENACLICK/AUVI-Q) 0.3 mg/0.3 mL injection Inject 0.3 mg into the shoulder, thigh, or buttocks.     ? fluticasone (FLONASE) 50 mcg/actuation nasal spray Apply 1 spray into each nostril 2 (two) times a day as needed for allergies.      ? fluticasone-umeclidinium-vilanterol (TRELEGY ELLIPTA) 100-62.5-25 mcg DsDv inhaler Inhale 1 Inhalation daily. 1 each 3   ? furosemide (LASIX) 40 MG tablet Take 1 tablet (40 mg total) by mouth 2 (two) times a day. (Patient taking differently: Take 80 mg by mouth every morning. and 40mg at 2pm) 180 tablet 1   ? gabapentin (NEURONTIN) 300 MG capsule Take 2 capsules (600 mg total) by mouth 2 (two) times a day. 360 capsule 2   ? hydrOXYzine pamoate (VISTARIL) 25 MG capsule Take 25 mg by mouth 3 (three) times a day.      ? ipratropium (ATROVENT HFA) 17 mcg/actuation inhaler Inhale 2 puffs every 6 (six) hours. 3 Inhaler 3   ? loperamide (IMODIUM) 2 mg capsule Take 4 mg by mouth as needed for diarrhea (Every 3 hours as needed for Diarrhea).     ? melatonin 3 mg Tab tablet Take 1 tablet (3 mg total) by mouth at bedtime.  0   ? metoprolol succinate (TOPROL-XL) 50 MG 24 hr tablet Take 100 mg by mouth at bedtime.     ? MYRBETRIQ 50 mg Tb24 ER tablet TAKE 1 TABLET DAILY 90 tablet 3   ? pantoprazole (PROTONIX) 40 MG tablet Take 1 tablet (40 mg total) by mouth daily. 90 tablet 4   ? polyethylene glycol (MIRALAX) 17 gram packet Take 1 packet (17 g total) by mouth daily as needed.  0   ? rivaroxaban ANTICOAGULANT (XARELTO) 15 mg tablet Take 15 mg by mouth daily with supper.     ? simvastatin (ZOCOR) 40 MG tablet Take 1 tablet (40 mg total) by mouth at bedtime. 90 tablet 3   ? sodium  "chloride (OCEAN) 0.65 % nasal spray 2 sprays into each nostril every 2 (two) hours as needed for congestion.      ? solifenacin (VESICARE) 5 MG tablet Take 1 tablet (5 mg total) by mouth at bedtime. 90 tablet 3   ? tamoxifen (NOLVADEX) 20 MG tablet TAKE 1 TABLET DAILY 90 tablet 3   ? traMADoL (ULTRAM) 50 mg tablet Take 1 tablet (50 mg total) by mouth every 6 (six) hours as needed for pain or severe pain (7-10). 30 tablet 0   ? venlafaxine (EFFEXOR-XR) 150 MG 24 hr capsule TAKE 1 CAPSULE DAILY 90 capsule 3     No current facility-administered medications on file prior to visit.        Allergies     Allergies   Allergen Reactions   ? Sulfa (Sulfonamide Antibiotics) Rash     Headaches and dizziness.   ? Homeopathic Drugs Unknown and Other (See Comments)     Comment: runny nose, Comment: runny nose   ? Mold Extracts    ? Morphine (Pf)      hallucinate   ? Lisinopril Cough, Unknown and Itching     Comment: cough, Comment: cough   ? Sulfacetamide Sodium Rash       Review of Systems   A comprehensive review of 14 systems was done. Pertinent findings noted here and in history of present illness. All the rest negative.  Constitutional: Negative.  Negative for fever, chills, she has  activity change, appetite change and fatigue.   HENT: Negative for congestion and facial swelling.    Eyes: Negative for photophobia, redness and visual disturbance.   Respiratory: Negative for cough and chest tightness.    Cardiovascular: Negative for chest pain, palpitations and has  leg swelling.   Gastrointestinal: Negative for nausea, diarrhea, constipation, blood in stool and abdominal distention.   Genitourinary: Negative.    Musculoskeletal: Negative.  Reports limited endurance  Skin: Negative.    Neurological: Negative for dizziness, tremors, syncope, weakness, light-headedness and headaches.   Hematological: Does not bruise/bleed easily.   Psychiatric/Behavioral: Negative.        Physical Exam     Recent Vitals 4/15/2021   Height 5' 0\" "   Weight 132 lbs 10 oz   BSA (m2) 1.6 m2   /78   Pulse 80   Temp 98.3   SpO2 90   Some recent data might be hidden   on 2l  R/C BP 90/54    Constitutional: Oriented to person, place, and time and appears well-developed.   HEENT:  Normocephalic and atraumatic.  Eyes: Conjunctivae and EOM are normal. Pupils are equal, round, and reactive to light. No discharge.  No scleral icterus. Nose normal. Mouth/Throat: Oropharynx is clear and moist. No oropharyngeal exudate.    NECK: Normal range of motion. Neck supple. No JVD present. No tracheal deviation present. No thyromegaly present.   CARDIOVASCULAR: Normal rate, regular rhythm and intact distal pulses.  Exam reveals no gallop and no friction rub.  Systolic murmur present.  PULMONARY: Effort normal and breath sounds normal. No respiratory distress.No Wheezing or rales.  faint rales heard TAWNY WORSE ON LEFT LUNG BASE  ABDOMEN: Soft. Bowel sounds are normal. No distension and no mass.  There is no tenderness. There is no rebound and no guarding. No HSM.  MUSCULOSKELETAL: Normal range of motion. 2+ LE edema and no tenderness. Mild kyphosis, HAS RIB tenderness.  LYMPH NODES: Has no cervical, supraclavicular, axillary and groin adenopathy.   NEUROLOGICAL: Alert and oriented to person, place, and time. No cranial nerve deficit.  Normal muscle tone. Coordination normal.   GENITOURINARY: Deferred exam.  SKIN: Skin is warm and dry. No rash noted. No erythema. No pallor.   EXTREMITIES: No cyanosis, no clubbing, 2+le edema. No Deformity.  PSYCHIATRIC: Normal mood, affect and behavior.      Lab Results     Results for orders placed or performed in visit on 04/05/21   Basic Metabolic Panel   Result Value Ref Range    Sodium 141 136 - 145 mmol/L    Potassium 3.9 3.5 - 5.0 mmol/L    Chloride 104 98 - 107 mmol/L    CO2 27 22 - 31 mmol/L    Anion Gap, Calculation 10 5 - 18 mmol/L    Glucose 77 70 - 125 mg/dL    Calcium 8.5 8.5 - 10.5 mg/dL    BUN 17 8 - 28 mg/dL    Creatinine 1.32  (H) 0.60 - 1.10 mg/dL    GFR MDRD Af Amer 47 (L) >60 mL/min/1.73m2    GFR MDRD Non Af Amer 39 (L) >60 mL/min/1.73m2                Electronically signed by  VASHTI Sanchez Mona, MBBS  4/22/2021  8:41 AM  Sign when Signing Visit    [facility] note      Patient: Irene León  MRN: [id number]      [facility] [[id number]]  Reason for Visit     Chief Complaint   Patient presents with   ? Review Of Multiple Medical Conditions   Follow-up HYPOTENSION AND PAIN    Code Status     Full code    Assessment       Severe sepsis with pyelonephritis  E. coli bacteremia  Possible pneumonia currently improved after antibiotic usage  COPD with chronic hypoxic respiratory failure on 2 L of oxygen at baseline  Chronic congestive heart failure with reduced ejection fraction with acute exacerbation  ANATOLIY  Electrolyte abnormalities with low potassium and magnesium  Atrial fibrillation with rapid ventricular response noted in the hospital  History of depression anxiety.  Insomnia.  History of falls.  Hypotension  Acute encephalopathy secondary to sepsis  Generalized weakness  Recurrent hospitalizations- 3 hospital admission noted in last  6 weeks including 1 ICU admission  FTT    Plan     Patient has been discharged to the TCU.  She was seen today for follow-up.  Recently seen for hypertension and abnormal lung exam.  Reweigh today is 131 pounds which is almost a 10 pound drop since admission.  Lymphedema concerns are minimal.  Recheck labs done in the TCU showed resolution of her leukocytosis  Recheck white count was 9.5  Her BMP has also shown improvement with a creatinine of 1.3 down from 1.5  In addition hyponatremia has resolved.  She has responded very well to incentive spirometry and aggressive pulmonary toileting and doing quite well with albuterol nebulizer is being scheduled for her.  Pain concerns are stable we are trying to minimize narcotics.  Continue with her PT and OT      History      Patient is a very pleasant 76 y.o. female who is admitted to TCU  Patient presented to the hospital with increasing weakness and altered mental status.  She has underlying history of COPD.  sHe is on oxygen.  She continues to be short of breath  However overall improvement noted since she was put on pulmonary toileting  She is all albuterol nebulizers which have been scheduled with significant improvement noted she reports that she feels her breathing has improved significantly  She wants to continue with the same treatment at home when she discharges  Also noted to be profoundly hypertensive due to with staff is quite concerned about pushing her meds and diuretics.  Eventually ended up holding her diuretics because of this concern  Unfortunately she ultimately fell on the floor and could not get up and was brought into the hospital  She was admitted with severe sepsis with pyelonephritis and E. coli bacteremia  She was also suspected to have acute pneumonia and these were treated with antibiotics given IV with improvement.  Reports no fever no congestion however does have intermittent cough but reports that she just cannot bring the phlegm up  She also was noted to have acute exacerbation of her congestive heart failure.  Echocardiogram shows an EF of 45%.  She was given IV Lasix for diuresis and has been discharged on high doses of oral Lasix.  Her Toprol was also increased.  Today her weights were reviewed initially she is down more than 10 pounds.  Lymphedema concerns are minimal.  She reports compliance with her diuretics  She developed acute diarrhea felt to be secondary to antibiotics they have recommended continuing Imodium.  A FIB was stable and xarelto was continued. She had some episodes of A. fib with rapid ventricular response in the hospital.  Dosage has been reduced prior to discharge  Heart rates have been stable  Initially some hypotensive episodes noted but no improvement noted.  We are trying  to minimize nephrotoxins due to renal impairment concern but her last creatinine had shown improvement with resolution of mild hyponatremia        Past Medical History     Active Ambulatory (Non-Hospital) Problems    Diagnosis   ? At high risk for impaired skin integrity   ? ACP (advance care planning)   ? Exacerbation of intermittent asthma, unspecified asthma severity   ? Acute cystitis without hematuria   ? Factor V Leiden mutation (H)   ? Chronic anticoagulation   ? Essential hypertension, benign   ? Acute systolic congestive heart failure (H)   ? Pain due to fracture   ? High risk medication use   ? Heart failure exacerbated by sotalol (H)   ? T12 compression fracture (H)   ? Acute on chronic diastolic congestive heart failure (H)   ? Metabolic encephalopathy   ? Low back pain   ? Compression fracture of L1 vertebra, sequela   ? Chronic respiratory failure with hypoxia (H)   ? Chronic obstructive pulmonary disease, unspecified COPD type (H)   ? Chronic systolic congestive heart failure (H)   ? Hypoxia   ? S/P hip replacement, left   ? Hip pain, left   ? Acute hypoxemic respiratory failure (H)   ? Chest pain   ? COPD exacerbation (H)   ? Herpes zoster without complication   ? Chest wall pain   ? Benign essential hypertension   ? Dyslipidemia   ? Acute respiratory failure with hypoxia (H)   ? Pulsatile tinnitus of both ears   ? Hot flashes due to tamoxifen   ? Pleural nodules   ? Hot flashes, menopausal   ? Malignant neoplasm of central portion of left female breast (H)   ? CKD (chronic kidney disease) stage 3, GFR 30-59 ml/min   ? Dyspnea   ? Insomnia   ? Leg pain, left   ? Femur fracture (H)   ? GERD (gastroesophageal reflux disease)   ? Post menopausal syndrome   ? OP (osteoporosis)   ? Hypertension   ? Pulmonary emphysema, unspecified emphysema type (H)   ? Hyperlipidemia   ? Centrilobular emphysema (H)   ? Anisocoria   ? OAB (overactive bladder)   ? Factor 5 Leiden mutation, heterozygous (H)   ? Family  history of blood disease   ? Bladder incontinence   ? Physical deconditioning   ? Cardiomyopathy, idiopathic (H)   ? Depression with anxiety   ? Osteoarthritis of hip   ? Herniated intervertebral disc     Past Medical History:   Diagnosis Date   ? ANATOLIY (acute kidney injury) (H)    ? Allergic rhinitis    ? Arrhythmia    ? Atrial fibrillation with RVR (H)    ? Bacteremia    ? Breast cancer (H) 2017   ? Cardiomyopathy (H)    ? Centrilobular emphysema (H)    ? CHF (congestive heart failure) (H)    ? CKD (chronic kidney disease)    ? COPD (chronic obstructive pulmonary disease) (H)    ? Coronary artery disease    ? Depression    ? Dysphagia    ? E. coli sepsis (H)    ? Factor 5 Leiden mutation, heterozygous (H)    ? GERD (gastroesophageal reflux disease)    ? Hx of radiation therapy 2017   ? Hyperlipidemia    ? Hypertension    ? Hypokalemia    ? Hypomagnesemia    ? Idiopathic cardiomyopathy (H)    ? OAB (overactive bladder)    ? Osteoporosis    ? Sacral insufficiency fracture    ? Sinusitis    ? Syncope    ? Urge incontinence    ? Vocal cord dysfunction        Past Social History     Reviewed, and she  reports that she quit smoking about 13 years ago. She quit smokeless tobacco use about 16 years ago. She reports that she does not drink alcohol or use drugs.    Family History     Reviewed, and family history includes Breast cancer in her maternal aunt; Osteoporosis in an other family member; Prostate cancer in her brother and maternal uncle.    Medication List   Post Discharge Medication Reconciliation Status: discharge medications reconciled, continue medications without change   Current Outpatient Medications on File Prior to Visit   Medication Sig Dispense Refill   ? acetaminophen (TYLENOL) 500 MG tablet Take 1,000 mg by mouth 3 (three) times a day.     ? albuterol (PROAIR HFA;PROVENTIL HFA;VENTOLIN HFA) 90 mcg/actuation inhaler Inhale 2 puffs every 4 (four) hours as needed for wheezing. 1 Inhaler 0   ? ascorbic acid,  vitamin C, (VITAMIN C) 1000 MG tablet Take 1,000 mg by mouth daily. Airborne 1 tab daily     ? azelastine (ASTELIN) 137 mcg (0.1 %) nasal spray 2 sprays into each nostril daily.     ? azithromycin (ZITHROMAX) 250 MG tablet Take 1 tablet (250 mg total) by mouth at bedtime. 1 tablet 0   ? benzonatate (TESSALON) 200 MG capsule Take 1 capsule (200 mg total) by mouth 3 (three) times a day as needed for cough. 90 capsule 0   ? chlorhexidine (PERIDEX) 0.12 % solution Apply 15 mL to the mouth or throat at bedtime as needed.      ? cholecalciferol, vitamin D3, 1,000 unit tablet Take 1,000 Units by mouth daily.     ? diclofenac sodium (VOLTAREN) 1 % Gel Apply 1 g topically 3 (three) times a day.      ? EPINEPHrine (EPIPEN/ADRENACLICK/AUVI-Q) 0.3 mg/0.3 mL injection Inject 0.3 mg into the shoulder, thigh, or buttocks.     ? fluticasone (FLONASE) 50 mcg/actuation nasal spray Apply 1 spray into each nostril 2 (two) times a day as needed for allergies.      ? fluticasone-umeclidinium-vilanterol (TRELEGY ELLIPTA) 100-62.5-25 mcg DsDv inhaler Inhale 1 Inhalation daily. 1 each 3   ? furosemide (LASIX) 40 MG tablet Take 1 tablet (40 mg total) by mouth 2 (two) times a day. (Patient taking differently: Take 80 mg by mouth every morning. and 40mg at 2pm) 180 tablet 1   ? gabapentin (NEURONTIN) 300 MG capsule Take 2 capsules (600 mg total) by mouth 2 (two) times a day. 360 capsule 2   ? hydrOXYzine pamoate (VISTARIL) 25 MG capsule Take 25 mg by mouth 3 (three) times a day.      ? ipratropium (ATROVENT HFA) 17 mcg/actuation inhaler Inhale 2 puffs every 6 (six) hours. 3 Inhaler 3   ? loperamide (IMODIUM) 2 mg capsule Take 4 mg by mouth as needed for diarrhea (Every 3 hours as needed for Diarrhea).     ? melatonin 3 mg Tab tablet Take 1 tablet (3 mg total) by mouth at bedtime.  0   ? metoprolol succinate (TOPROL-XL) 50 MG 24 hr tablet Take 100 mg by mouth at bedtime.     ? MYRBETRIQ 50 mg Tb24 ER tablet TAKE 1 TABLET DAILY 90 tablet 3   ?  pantoprazole (PROTONIX) 40 MG tablet Take 1 tablet (40 mg total) by mouth daily. 90 tablet 4   ? polyethylene glycol (MIRALAX) 17 gram packet Take 1 packet (17 g total) by mouth daily as needed.  0   ? rivaroxaban ANTICOAGULANT (XARELTO) 15 mg tablet Take 15 mg by mouth daily with supper.     ? simvastatin (ZOCOR) 40 MG tablet Take 1 tablet (40 mg total) by mouth at bedtime. 90 tablet 3   ? sodium chloride (OCEAN) 0.65 % nasal spray 2 sprays into each nostril every 2 (two) hours as needed for congestion.      ? solifenacin (VESICARE) 5 MG tablet Take 1 tablet (5 mg total) by mouth at bedtime. 90 tablet 3   ? tamoxifen (NOLVADEX) 20 MG tablet TAKE 1 TABLET DAILY 90 tablet 3   ? traMADoL (ULTRAM) 50 mg tablet Take 1 tablet (50 mg total) by mouth every 6 (six) hours as needed for pain or severe pain (7-10). 30 tablet 0   ? venlafaxine (EFFEXOR-XR) 150 MG 24 hr capsule TAKE 1 CAPSULE DAILY 90 capsule 3     No current facility-administered medications on file prior to visit.        Allergies     Allergies   Allergen Reactions   ? Sulfa (Sulfonamide Antibiotics) Rash     Headaches and dizziness.   ? Homeopathic Drugs Unknown and Other (See Comments)     Comment: runny nose, Comment: runny nose   ? Mold Extracts    ? Morphine (Pf)      hallucinate   ? Lisinopril Cough, Unknown and Itching     Comment: cough, Comment: cough   ? Sulfacetamide Sodium Rash       Review of Systems   A comprehensive review of 14 systems was done. Pertinent findings noted here and in history of present illness. All the rest negative.  Constitutional: Negative.  Negative for fever, chills, she has  activity change, appetite change and fatigue.   HENT: Negative for congestion and facial swelling.    Eyes: Negative for photophobia, redness and visual disturbance.   Respiratory: Negative for cough and chest tightness.    Cardiovascular: Negative for chest pain, palpitations and has  leg swelling.   Gastrointestinal: Negative for nausea, diarrhea,  "constipation, blood in stool and abdominal distention.   Genitourinary: Negative.    Musculoskeletal: Negative.  Reports limited endurance  Skin: Negative.    Neurological: Negative for dizziness, tremors, syncope, weakness, light-headedness and headaches.   Hematological: Does not bruise/bleed easily.   Psychiatric/Behavioral: Negative.        Physical Exam     Recent Vitals 4/15/2021   Height 5' 0\"   Weight 132 lbs 10 oz   BSA (m2) 1.6 m2   /78   Pulse 80   Temp 98.3   SpO2 90   Some recent data might be hidden   on 2l  R/C BP 90/54    Constitutional: Oriented to person, place, and time and appears well-developed.   HEENT:  Normocephalic and atraumatic.  Eyes: Conjunctivae and EOM are normal. Pupils are equal, round, and reactive to light. No discharge.  No scleral icterus. Nose normal. Mouth/Throat: Oropharynx is clear and moist. No oropharyngeal exudate.    NECK: Normal range of motion. Neck supple. No JVD present. No tracheal deviation present. No thyromegaly present.   CARDIOVASCULAR: Normal rate, regular rhythm and intact distal pulses.  Exam reveals no gallop and no friction rub.  Systolic murmur present.  PULMONARY: Effort normal and breath sounds normal. No respiratory distress.No Wheezing or rales.  faint rales heard TAWNY WORSE ON LEFT LUNG BASE  ABDOMEN: Soft. Bowel sounds are normal. No distension and no mass.  There is no tenderness. There is no rebound and no guarding. No HSM.  MUSCULOSKELETAL: Normal range of motion. 2+ LE edema and no tenderness. Mild kyphosis, HAS RIB tenderness.  LYMPH NODES: Has no cervical, supraclavicular, axillary and groin adenopathy.   NEUROLOGICAL: Alert and oriented to person, place, and time. No cranial nerve deficit.  Normal muscle tone. Coordination normal.   GENITOURINARY: Deferred exam.  SKIN: Skin is warm and dry. No rash noted. No erythema. No pallor.   EXTREMITIES: No cyanosis, no clubbing, 2+le edema. No Deformity.  PSYCHIATRIC: Normal mood, affect and " behavior.      Lab Results     Results for orders placed or performed in visit on 04/05/21   Basic Metabolic Panel   Result Value Ref Range    Sodium 141 136 - 145 mmol/L    Potassium 3.9 3.5 - 5.0 mmol/L    Chloride 104 98 - 107 mmol/L    CO2 27 22 - 31 mmol/L    Anion Gap, Calculation 10 5 - 18 mmol/L    Glucose 77 70 - 125 mg/dL    Calcium 8.5 8.5 - 10.5 mg/dL    BUN 17 8 - 28 mg/dL    Creatinine 1.32 (H) 0.60 - 1.10 mg/dL    GFR MDRD Af Amer 47 (L) >60 mL/min/1.73m2    GFR MDRD Non Af Amer 39 (L) >60 mL/min/1.73m2                Electronically signed by  VASHTI Sanchez

## 2021-06-21 NOTE — LETTER
Letter by Paola Rose MBBS at      Author: Paola Rose MBBS Service: -- Author Type: --    Filed:  Encounter Date: 11/9/2020 Status: (Other)         Patient: Irene León   MR Number: [id number]   YOB: 1945   Date of Visit: 11/9/2020       [facility] Admission note      Patient: Irene León  MRN: [id number]  Date of Service: 11/9/2020      [facility] [[id number]]  Reason for Visit     Chief Complaint   Patient presents with   ? Problem Visit   F/U PAIN /MUSCLE SPASMS/ chf    Code Status     FULL CODE    Assessment     -Acute CHF exacerbation with persistent weight gain in spite of being on a higher dose of Lasix  -Ongoing concerns about cough and congestion with hypoxic respiratory failure  -T12 compression fracture  -ANATOLIY with elevated creatinine 1.4 in the TCU  -Osteoporosis restarted on Prolia  -Pain management no longer on narcotics; no discharge on narcotics including Percocet  -Acute metabolic encephalopathy/delirium felt to be secondary to opioid medication  -CHF with reduced ejection fraction acute exacerbation  -- ANATOLIY /cardiorenal syndrome  -History of COPD and asthma  -Acute on chronic hypoxic respiratory failure ON 3L BY NC  -Peripheral arterial disease  -History of factor V Leiden and occipital stroke on Xarelto  -History of breast cancer  -Concern for mild dementia with baseline cognitive impairment  Slums 13/30  -Generalized weakness      Plan     Patient has been admitted to the TCU for strengthening and rehab  Patient followed up for her CHF exacerbation.  Did not respond to Lasix  She is now on Bumex with improvement noted weights are down 3 pounds.  Overall reporting an improvement in her breathing less coughing and less congestion and feels better.  Pain management reviewed with her.  Refusing narcotic taper.  As per the patient pain is worse which is limiting her ability to participate walking was down 10 feet.  She is following up with  orthopedics tomorrow we will await the recommendations  Recheck labs done on 11 /4 shows persistent creatinine elevation of 1.4 we will recheck BMP again this week  Her prior BMP also had a creatinine of 1.4 so may be baseline  she  has low blood pressure today  Monitor her pressure trends  before adjusting medications    History     Patient is a very pleasant 75 y.o. female who is admitted to TCU.  Seen today again because of worsening CHF symptoms with progressive weight gain   He did not respond to Lasix or switch to Bumex with improvement noted she is reporting actually that her breathing is better.  She is coughing less.  Weights are down -147LB TODAY  SHE HAS  acute low back pain.  She was admitted in the hospital.  Imaging revealed acute/subacute inferior endplate compression fracture of T12 with 50% loss of height.  Orthopedics was consulted.  She has been discharged weightbearing as tolerated with a TLSO brace to be worn all day  Outpatient follow-up for consideration of kyphoplasty recommended in 2 to 3 weeks.  Has been given to her for follow-up with orthopedics in addition she has developed piriformis syndrome on the right side and and the past that responded by injections.  He has been asking for narcotic withdrawal.  Unfortunately patient reported that her pain actually has worsened today.  She is reporting that she is having more pain and has difficulty walking she is actually walk less.  She was walking 200 feet but today walked only 10 feet.  I am not sure this is accurate.  However she has been complaining the same thing to the nursing also.  We will monitor and await orthopedic recommendation from tomorrow  Due to ongoing cough and congestion issue stat Covid testing was done which has come back negative.  She was tested prior on 10/27/2020 also and was negative.  She remains afebrile.  Pain remains her biggest issue.  Mood and behaviors have been stable      Past Medical History     Active  Ambulatory (Non-Hospital) Problems    Diagnosis   ? Pain due to fracture   ? High risk medication use   ? Heart failure exacerbated by sotalol (H)   ? T12 compression fracture (H)   ? Acute on chronic diastolic congestive heart failure (H)   ? Metabolic encephalopathy   ? Low back pain   ? Compression fracture of L1 vertebra, sequela   ? Chronic respiratory failure with hypoxia (H)   ? Chronic obstructive pulmonary disease, unspecified COPD type (H)   ? Chronic systolic congestive heart failure (H)   ? Hypoxia   ? S/P hip replacement, left   ? Hip pain, left   ? Acute hypoxemic respiratory failure (H)   ? Chest pain   ? COPD exacerbation (H)   ? Herpes zoster without complication   ? Chest wall pain   ? Benign essential hypertension   ? Dyslipidemia   ? Acute respiratory failure with hypoxia (H)   ? Pulsatile tinnitus of both ears   ? Hot flashes due to tamoxifen   ? Pleural nodules   ? Hot flashes, menopausal   ? Malignant neoplasm of central portion of left female breast (H)   ? CKD (chronic kidney disease) stage 3, GFR 30-59 ml/min   ? Dyspnea   ? Insomnia   ? Leg pain, left   ? Femur fracture (H)   ? GERD (gastroesophageal reflux disease)   ? Post menopausal syndrome   ? OP (osteoporosis)   ? Hypertension   ? Pulmonary emphysema, unspecified emphysema type (H)   ? Hyperlipidemia   ? Centrilobular emphysema (H)   ? Anisocoria   ? OAB (overactive bladder)   ? Factor 5 Leiden mutation, heterozygous (H)   ? Family history of blood disease   ? Bladder incontinence   ? Physical deconditioning   ? Cardiomyopathy, idiopathic (H)   ? Depression with anxiety   ? Osteoarthritis of hip   ? Herniated intervertebral disc     Past Medical History:   Diagnosis Date   ? Arrhythmia    ? Breast cancer (H) 2017   ? CHF (congestive heart failure) (H)    ? COPD (chronic obstructive pulmonary disease) (H)    ? Coronary artery disease    ? GERD (gastroesophageal reflux disease)    ? Hx of radiation therapy 2017   ? Hyperlipidemia    ?  Hypertension    ? Idiopathic cardiomyopathy (H)        Past Social History     Reviewed, and she  reports that she quit smoking about 13 years ago. She quit smokeless tobacco use about 16 years ago. She reports that she does not drink alcohol or use drugs.    Family History     Reviewed, and family history includes Breast cancer in her maternal aunt; Osteoporosis in an other family member; Prostate cancer in her brother and maternal uncle.    Medication List   Post Discharge Medication Reconciliation Status: discharge medications reconciled, continue medications without change   Current Outpatient Medications on File Prior to Visit   Medication Sig Dispense Refill   ? acetaminophen (TYLENOL) 325 MG tablet Take 2 tablets (650 mg total) by mouth 3 (three) times a day. (Patient taking differently: Take 650 mg by mouth 4 (four) times a day. )  0   ? ADVAIR DISKUS 500-50 mcg/dose DISKUS USE 1 INHALATION TWICE A DAY 60 each 11   ? albuterol (PROAIR HFA;PROVENTIL HFA;VENTOLIN HFA) 90 mcg/actuation inhaler Inhale 2 puffs every 4 (four) hours as needed for wheezing. 1 Inhaler 0   ? ascorbic acid, vitamin C, (VITAMIN C) 1000 MG tablet Take 1,000 mg by mouth daily. Airborne 1 tab daily     ? aspirin 81 MG EC tablet Take 81 mg by mouth daily.     ? azelastine (ASTELIN) 137 mcg (0.1 %) nasal spray 2 sprays into each nostril daily.     ? benzonatate (TESSALON) 200 MG capsule Take 1 capsule (200 mg total) by mouth 3 (three) times a day as needed for cough. 90 capsule 2   ? [] calcitonin, salmon, (MIACALCIN) 200 unit/actuation nasal spray Apply 1 spray into alternating nostrils daily for 14 days.  0   ? celecoxib (CELEBREX) 200 MG capsule Take 200 mg by mouth 2 (two) times a day.     ? chlorhexidine (PERIDEX) 0.12 % solution Apply 15 mL to the mouth or throat at bedtime as needed.      ? cholecalciferol, vitamin D3, 1,000 unit tablet Take 1,000 Units by mouth daily.     ? diclofenac sodium (VOLTAREN) 1 % Gel Apply 4 g to  area of pain in low back 4 times daily  0   ? EPINEPHrine (EPIPEN/ADRENACLICK/AUVI-Q) 0.3 mg/0.3 mL injection Inject 0.3 mg into the shoulder, thigh, or buttocks.     ? famotidine (PEPCID) 20 MG tablet Take 20 mg by mouth daily.     ? fluticasone (FLONASE) 50 mcg/actuation nasal spray Apply 1 spray into each nostril as needed.      ? furosemide (LASIX) 40 MG tablet Take 1 tablet (40 mg total) by mouth 2 (two) times a day at 9am and 6pm.  0   ? gabapentin (NEURONTIN) 300 MG capsule Take 2 capsules (600 mg total) by mouth 2 (two) times a day. 360 capsule 2   ? HYDROcodone-acetaminophen 5-325 mg per tablet Take 1 tablet by mouth every 4 (four) hours as needed. 90 tablet 0   ? ipratropium (ATROVENT HFA) 17 mcg/actuation inhaler Inhale 2 puffs every 6 (six) hours. 3 Inhaler 3   ? ipratropium-albuterol (DUO-NEB) 0.5-2.5 mg/3 mL nebulizer 1 neb 4 times a day until seen by primary and then as needed or as directed 60 vial 1   ? lidocaine (XYLOCAINE) 5 % ointment Apply to area of pain in low back 4 times daily 35.44 g 0   ? losartan (COZAAR) 50 MG tablet TAKE 1 TABLET DAILY 90 tablet 3   ? melatonin 3 mg Tab tablet Take 1 tablet (3 mg total) by mouth at bedtime.  0   ? metoprolol succinate (TOPROL-XL) 50 MG 24 hr tablet Take 1.5 tablets (75 mg total) by mouth daily. 135 tablet 3   ? mirabegron (MYRBETRIQ) 50 mg Tb24 ER tablet Take 1 tablet (50 mg total) by mouth daily. 90 tablet 3   ? omalizumab (XOLAIR) 150 mg/mL Syrg injection Inject 300 mg under the skin every 14 (fourteen) days.      ? pantoprazole (PROTONIX) 40 MG tablet Take 1 tablet (40 mg total) by mouth daily. 90 tablet 4   ? polyethylene glycol (MIRALAX) 17 gram packet Take 1 packet (17 g total) by mouth daily as needed.  0   ? QUEtiapine (SEROQUEL) 25 MG tablet Take 0.5 tablets (12.5 mg total) by mouth 3 (three) times a day as needed (Agitation that is not redirectable.).  0   ? rivaroxaban ANTICOAGULANT (XARELTO) 20 mg tablet Take 20 mg by mouth at bedtime.     ?  senna-docusate (PERICOLACE) 8.6-50 mg tablet Take 1 tablet by mouth daily as needed for constipation.  0   ? simvastatin (ZOCOR) 40 MG tablet Take 1 tablet (40 mg total) by mouth at bedtime. 90 tablet 3   ? sodium chloride (OCEAN) 0.65 % nasal spray 2 sprays into each nostril as needed.     ? solifenacin (VESICARE) 5 MG tablet Take 1 tablet (5 mg total) by mouth at bedtime. 90 tablet 3   ? tamoxifen (NOLVADEX) 20 MG tablet TAKE 1 TABLET DAILY 90 tablet 4   ? traZODone (DESYREL) 50 MG tablet TAKE 1 TABLET(50 MG) BY MOUTH AT BEDTIME AS NEEDED 90 tablet 2   ? triamcinolone (KENALOG) 0.1 % cream Apply to rash on arms twice daily for one week 30 g 0   ? triamcinolone (KENALOG) 0.5 % ointment Apply o foot twice a day 30 g 1   ? venlafaxine (EFFEXOR-XR) 150 MG 24 hr capsule TAKE 1 CAPSULE DAILY 90 capsule 4     Current Facility-Administered Medications on File Prior to Visit   Medication Dose Route Frequency Provider Last Rate Last Dose   ? denosumab 60 mg (PROLIA 60 mg/ml)  60 mg Subcutaneous Q6 Months Suzanna Shane MD   60 mg at 02/04/20 1352       Allergies     Allergies   Allergen Reactions   ? Sulfa (Sulfonamide Antibiotics) Rash     Headaches and dizziness.   ? Homeopathic Drugs Unknown and Other (See Comments)     Comment: runny nose, Comment: runny nose   ? Mold Extracts    ? Morphine (Pf)      hallucinate   ? Lisinopril Cough, Unknown and Itching     Comment: cough, Comment: cough   ? Sulfacetamide Sodium Rash       Review of Systems   A comprehensive review of 14 systems was done. Pertinent findings noted here and in history of present illness. All the rest negative.  Constitutional: Negative.  Negative for fever, chills, she has activity change, appetite change and fatigue.   HENT: Negative for congestion and facial swelling.    Eyes: Negative for photophobia, redness and visual disturbance.   Respiratory: Negative for cough and chest tightness.    Cardiovascular: Negative for chest pain, palpitations and leg  swelling.   Gastrointestinal: Negative for nausea, diarrhea, constipation, blood in stool and abdominal distention.   Genitourinary: Negative.    Musculoskeletal: Negative.  Pain in her low back; today reporting intractable muscle spasms which are not improving  Has piriformis pain  Skin: Negative.    Neurological: Negative for dizziness, tremors, syncope, weakness, light-headedness and headaches.   Hematological: Does not bruise/bleed easily.   Psychiatric/Behavioral: Negative.  Significant distress due to pain      Physical Exam     Recent Vitals 10/24/2020   Height -   Weight -   BSA (m2) -   /88   Pulse 85   Temp 97.5   Temp src 1   SpO2 92   Some recent data might be hidden     Currently on 3 L of oxygen by nasal cannula wt 147lb  R/C BP 94/58  Constitutional: Oriented to person, place, and time and appears well-developed.   HEENT:  Normocephalic and atraumatic.  Eyes: Conjunctivae and EOM are normal. Pupils are equal, round, and reactive to light. No discharge.  No scleral icterus. Nose normal. Mouth/Throat: Oropharynx is clear and moist. No oropharyngeal exudate.    NECK: Normal range of motion. Neck supple. No JVD present. No tracheal deviation present. No thyromegaly present.   CARDIOVASCULAR: Normal rate, regular rhythm and intact distal pulses.  Exam reveals no gallop and no friction rub.  Systolic murmur present.  PULMONARY: Effort normal and breath sounds normal. No respiratory distress.No Wheezing or rales.  Course breath sounds heard bilaterally  Wet cough noted  ABDOMEN: Soft. Bowel sounds are normal. No distension and no mass.  There is no tenderness. There is no rebound and no guarding. No HSM.  MUSCULOSKELETAL: Normal range of motion. No edema and no tenderness. Mild kyphosis,  tenderness  to palpation on her lower thoracic spine  Mobility is limited.  LYMPH NODES: Has no cervical, supraclavicular, axillary and groin adenopathy.   NEUROLOGICAL: Alert and oriented to person, place, and time.  No cranial nerve deficit.  Normal muscle tone. Coordination normal.   GENITOURINARY: Deferred exam.  SKIN: Skin is warm and dry. No rash noted. No erythema. No pallor.   EXTREMITIES: No cyanosis, no clubbing, no edema. No Deformity.  PSYCHIATRIC: Normal mood, affect and behavior.  High anxiety due to pain      Lab Results     Recent Results (from the past 240 hour(s))   COVID-19 Virus PCR MRF    Collection Time: 11/03/20  8:00 AM    Specimen: Nasopharyngeal Swab; Respiratory   Result Value Ref Range    COVID-19 VIRUS SPECIMEN SOURCE Nasopharyngeal     2019-nCOV Not Detected    Basic Metabolic Panel    Collection Time: 11/04/20  7:02 AM   Result Value Ref Range    Sodium 140 136 - 145 mmol/L    Potassium 4.0 3.5 - 5.0 mmol/L    Chloride 105 98 - 107 mmol/L    CO2 26 22 - 31 mmol/L    Anion Gap, Calculation 9 5 - 18 mmol/L    Glucose 77 70 - 125 mg/dL    Calcium 9.1 8.5 - 10.5 mg/dL    BUN 34 (H) 8 - 28 mg/dL    Creatinine 1.46 (H) 0.60 - 1.10 mg/dL    GFR MDRD Af Amer 42 (L) >60 mL/min/1.73m2    GFR MDRD Non Af Amer 35 (L) >60 mL/min/1.73m2                  VASHTI Sanchez

## 2021-06-21 NOTE — LETTER
Letter by Devon Suarez MD at      Author: Devon Suarez MD Service: -- Author Type: --    Filed:  Encounter Date: 10/19/2020 Status: (Other)                   Office Hours: Monday - Friday 8:00 - 4:30PM    Irene León  [address] [facility] [address]           October 19, 2020      Dear Irene:    It looks as though you are due to see Dr. Suarez in January. If you would like to schedule this please call [phone]         Sincerely,      [facility]

## 2021-06-21 NOTE — LETTER
Letter by Paola Rose MBBS at      Author: Paola Rose MBBS Service: -- Author Type: --    Filed:  Encounter Date: 11/11/2020 Status: (Other)         Patient: Irene León   MR Number: [id number]   YOB: 1945   Date of Visit: 11/11/2020       [facility] Admission note      Patient: Irene León  MRN: [id number]  Date of Service: 11/11/2020      [facility] [[id number]]  Reason for Visit     Chief Complaint   Patient presents with   ? Review Of Multiple Medical Conditions   F/U PAIN /MUSCLE SPASMS/ chf    Code Status     FULL CODE    Assessment     -Acute CHF exacerbation with persistent weight gain in spite of being on a higher dose of Lasix  -Ongoing concerns about cough and congestion with hypoxic respiratory failure  -T12 compression fracture  -ANATOLIY with elevated creatinine 1.4 in the TCU  -Osteoporosis restarted on Prolia  -Pain management no longer on narcotics; no discharge on narcotics including Percocet  -Acute metabolic encephalopathy/delirium felt to be secondary to opioid medication  -CHF with reduced ejection fraction acute exacerbation  -- ANATOLIY /cardiorenal syndrome  -History of COPD and asthma  -Acute on chronic hypoxic respiratory failure ON 3L BY NC  -Peripheral arterial disease  -History of factor V Leiden and occipital stroke on Xarelto  -History of breast cancer  -Concern for mild dementia with baseline cognitive impairment  Slums 13/30  -Generalized weakness      Plan     Patient has been admitted to the TCU for strengthening and rehab  Patient followed up for her CHF exacerbation.  Did not respond to Lasix  Significant clinical improvement noted with Bumex   Weights are down 143 pounds which is a 7 pound weight loss.  No longer coughing or congested  Creatinine is 1.46.  BMP shows stable electrolytes.  Plan is to discontinue her current Bumex regimen.  Continue with Bumex 2 mg just in the morning.  Recheck BMP in 1 week.  No narcotic taper for  now.  Follow-up with recommendations from orthopedics regarding kyphoplasty or steroid injection in her back in light of her intractable back pain and piriformis syndrome.  She has an orthopedic appointment today    History     Patient is a very pleasant 75 y.o. female who is admitted to TCU.  Admitted with a T12 compression fracture  She has been discharged weightbearing as tolerated with a TLSO brace to be worn all day  Outpatient follow-up for consideration of kyphoplasty recommended in 2 to 3 weeks.  Patient is continuing to rate her pain is quite high she is on narcotics and has been requesting them quite a bit.  As per her she is not ready anywhere close to weaning off herself narcotics family has been pushing for some taper.  She also had recent exacerbation of CHF for which she was given high doses of Bumex.  BMPs have been stable she is reporting her cough and congestion have improved.  Cognitively she has some impairment but overall has been stable she appears to be mildly forgetful.  Physically remains quite debilitated and wheelchair-bound      Past Medical History     Active Ambulatory (Non-Hospital) Problems    Diagnosis   ? Pain due to fracture   ? High risk medication use   ? Heart failure exacerbated by sotalol (H)   ? T12 compression fracture (H)   ? Acute on chronic diastolic congestive heart failure (H)   ? Metabolic encephalopathy   ? Low back pain   ? Compression fracture of L1 vertebra, sequela   ? Chronic respiratory failure with hypoxia (H)   ? Chronic obstructive pulmonary disease, unspecified COPD type (H)   ? Chronic systolic congestive heart failure (H)   ? Hypoxia   ? S/P hip replacement, left   ? Hip pain, left   ? Acute hypoxemic respiratory failure (H)   ? Chest pain   ? COPD exacerbation (H)   ? Herpes zoster without complication   ? Chest wall pain   ? Benign essential hypertension   ? Dyslipidemia   ? Acute respiratory failure with hypoxia (H)   ? Pulsatile tinnitus of both ears    ? Hot flashes due to tamoxifen   ? Pleural nodules   ? Hot flashes, menopausal   ? Malignant neoplasm of central portion of left female breast (H)   ? CKD (chronic kidney disease) stage 3, GFR 30-59 ml/min   ? Dyspnea   ? Insomnia   ? Leg pain, left   ? Femur fracture (H)   ? GERD (gastroesophageal reflux disease)   ? Post menopausal syndrome   ? OP (osteoporosis)   ? Hypertension   ? Pulmonary emphysema, unspecified emphysema type (H)   ? Hyperlipidemia   ? Centrilobular emphysema (H)   ? Anisocoria   ? OAB (overactive bladder)   ? Factor 5 Leiden mutation, heterozygous (H)   ? Family history of blood disease   ? Bladder incontinence   ? Physical deconditioning   ? Cardiomyopathy, idiopathic (H)   ? Depression with anxiety   ? Osteoarthritis of hip   ? Herniated intervertebral disc     Past Medical History:   Diagnosis Date   ? Arrhythmia    ? Breast cancer (H) 2017   ? CHF (congestive heart failure) (H)    ? COPD (chronic obstructive pulmonary disease) (H)    ? Coronary artery disease    ? GERD (gastroesophageal reflux disease)    ? Hx of radiation therapy 2017   ? Hyperlipidemia    ? Hypertension    ? Idiopathic cardiomyopathy (H)        Past Social History     Reviewed, and she  reports that she quit smoking about 13 years ago. She quit smokeless tobacco use about 16 years ago. She reports that she does not drink alcohol or use drugs.    Family History     Reviewed, and family history includes Breast cancer in her maternal aunt; Osteoporosis in an other family member; Prostate cancer in her brother and maternal uncle.    Medication List   Post Discharge Medication Reconciliation Status: discharge medications reconciled, continue medications without change   Current Outpatient Medications on File Prior to Visit   Medication Sig Dispense Refill   ? acetaminophen (TYLENOL) 325 MG tablet Take 2 tablets (650 mg total) by mouth 3 (three) times a day. (Patient taking differently: Take 650 mg by mouth 4 (four) times a  day. )  0   ? ADVAIR DISKUS 500-50 mcg/dose DISKUS USE 1 INHALATION TWICE A DAY 60 each 11   ? albuterol (PROAIR HFA;PROVENTIL HFA;VENTOLIN HFA) 90 mcg/actuation inhaler Inhale 2 puffs every 4 (four) hours as needed for wheezing. 1 Inhaler 0   ? ascorbic acid, vitamin C, (VITAMIN C) 1000 MG tablet Take 1,000 mg by mouth daily. Airborne 1 tab daily     ? aspirin 81 MG EC tablet Take 81 mg by mouth daily.     ? azelastine (ASTELIN) 137 mcg (0.1 %) nasal spray 2 sprays into each nostril daily.     ? benzonatate (TESSALON) 200 MG capsule Take 1 capsule (200 mg total) by mouth 3 (three) times a day as needed for cough. 90 capsule 2   ? celecoxib (CELEBREX) 200 MG capsule Take 200 mg by mouth 2 (two) times a day.     ? chlorhexidine (PERIDEX) 0.12 % solution Apply 15 mL to the mouth or throat at bedtime as needed.      ? cholecalciferol, vitamin D3, 1,000 unit tablet Take 1,000 Units by mouth daily.     ? diclofenac sodium (VOLTAREN) 1 % Gel Apply 4 g to area of pain in low back 4 times daily  0   ? EPINEPHrine (EPIPEN/ADRENACLICK/AUVI-Q) 0.3 mg/0.3 mL injection Inject 0.3 mg into the shoulder, thigh, or buttocks.     ? famotidine (PEPCID) 20 MG tablet Take 20 mg by mouth daily.     ? fluticasone (FLONASE) 50 mcg/actuation nasal spray Apply 1 spray into each nostril as needed.      ? furosemide (LASIX) 40 MG tablet Take 1 tablet (40 mg total) by mouth 2 (two) times a day at 9am and 6pm.  0   ? gabapentin (NEURONTIN) 300 MG capsule Take 2 capsules (600 mg total) by mouth 2 (two) times a day. 360 capsule 2   ? HYDROcodone-acetaminophen 5-325 mg per tablet Take 1 tablet by mouth every 4 (four) hours as needed. 90 tablet 0   ? ipratropium (ATROVENT HFA) 17 mcg/actuation inhaler Inhale 2 puffs every 6 (six) hours. 3 Inhaler 3   ? ipratropium-albuterol (DUO-NEB) 0.5-2.5 mg/3 mL nebulizer 1 neb 4 times a day until seen by primary and then as needed or as directed 60 vial 1   ? lidocaine (XYLOCAINE) 5 % ointment Apply to area of  pain in low back 4 times daily 35.44 g 0   ? losartan (COZAAR) 50 MG tablet TAKE 1 TABLET DAILY 90 tablet 3   ? melatonin 3 mg Tab tablet Take 1 tablet (3 mg total) by mouth at bedtime.  0   ? metoprolol succinate (TOPROL-XL) 50 MG 24 hr tablet Take 1.5 tablets (75 mg total) by mouth daily. 135 tablet 3   ? mirabegron (MYRBETRIQ) 50 mg Tb24 ER tablet Take 1 tablet (50 mg total) by mouth daily. 90 tablet 3   ? omalizumab (XOLAIR) 150 mg/mL Syrg injection Inject 300 mg under the skin every 14 (fourteen) days.      ? pantoprazole (PROTONIX) 40 MG tablet Take 1 tablet (40 mg total) by mouth daily. 90 tablet 4   ? polyethylene glycol (MIRALAX) 17 gram packet Take 1 packet (17 g total) by mouth daily as needed.  0   ? QUEtiapine (SEROQUEL) 25 MG tablet Take 0.5 tablets (12.5 mg total) by mouth 3 (three) times a day as needed (Agitation that is not redirectable.).  0   ? rivaroxaban ANTICOAGULANT (XARELTO) 20 mg tablet Take 20 mg by mouth at bedtime.     ? senna-docusate (PERICOLACE) 8.6-50 mg tablet Take 1 tablet by mouth daily as needed for constipation.  0   ? simvastatin (ZOCOR) 40 MG tablet Take 1 tablet (40 mg total) by mouth at bedtime. 90 tablet 3   ? sodium chloride (OCEAN) 0.65 % nasal spray 2 sprays into each nostril as needed.     ? solifenacin (VESICARE) 5 MG tablet Take 1 tablet (5 mg total) by mouth at bedtime. 90 tablet 3   ? tamoxifen (NOLVADEX) 20 MG tablet TAKE 1 TABLET DAILY 90 tablet 4   ? traZODone (DESYREL) 50 MG tablet TAKE 1 TABLET(50 MG) BY MOUTH AT BEDTIME AS NEEDED 90 tablet 2   ? triamcinolone (KENALOG) 0.1 % cream Apply to rash on arms twice daily for one week 30 g 0   ? triamcinolone (KENALOG) 0.5 % ointment Apply o foot twice a day 30 g 1   ? venlafaxine (EFFEXOR-XR) 150 MG 24 hr capsule TAKE 1 CAPSULE DAILY 90 capsule 4     Current Facility-Administered Medications on File Prior to Visit   Medication Dose Route Frequency Provider Last Rate Last Dose   ? denosumab 60 mg (PROLIA 60 mg/ml)  60  mg Subcutaneous Q6 Months Suzanna Shane MD   60 mg at 02/04/20 5153       Allergies     Allergies   Allergen Reactions   ? Sulfa (Sulfonamide Antibiotics) Rash     Headaches and dizziness.   ? Homeopathic Drugs Unknown and Other (See Comments)     Comment: runny nose, Comment: runny nose   ? Mold Extracts    ? Morphine (Pf)      hallucinate   ? Lisinopril Cough, Unknown and Itching     Comment: cough, Comment: cough   ? Sulfacetamide Sodium Rash       Review of Systems   A comprehensive review of 14 systems was done. Pertinent findings noted here and in history of present illness. All the rest negative.  Constitutional: Negative.  Negative for fever, chills, she has activity change, appetite change and fatigue.   HENT: Negative for congestion and facial swelling.    Eyes: Negative for photophobia, redness and visual disturbance.   Respiratory: Negative for cough and chest tightness.    Cardiovascular: Negative for chest pain, palpitations and leg swelling.   Gastrointestinal: Negative for nausea, diarrhea, constipation, blood in stool and abdominal distention.   Genitourinary: Negative.    Musculoskeletal: Negative.  Pain in her low back; today reporting intractable muscle spasms which are not improving  Has piriformis pain  Skin: Negative.    Neurological: Negative for dizziness, tremors, syncope, weakness, light-headedness and headaches.   Hematological: Does not bruise/bleed easily.   Psychiatric/Behavioral: Negative.  Significant distress due to pain      Physical Exam     Recent Vitals 10/24/2020   Height -   Weight -   BSA (m2) -   /88   Pulse 85   Temp 97.5   Temp src 1   SpO2 92   Some recent data might be hidden     Currently on 3 L of oxygen by nasal cannula wt 143lb  R/C /67  Constitutional: Oriented to person, place, and time and appears well-developed.   HEENT:  Normocephalic and atraumatic.  Eyes: Conjunctivae and EOM are normal. Pupils are equal, round, and reactive to light. No  discharge.  No scleral icterus. Nose normal. Mouth/Throat: Oropharynx is clear and moist. No oropharyngeal exudate.    NECK: Normal range of motion. Neck supple. No JVD present. No tracheal deviation present. No thyromegaly present.   CARDIOVASCULAR: Normal rate, regular rhythm and intact distal pulses.  Exam reveals no gallop and no friction rub.  Systolic murmur present.  PULMONARY: Effort normal and breath sounds normal. No respiratory distress.No Wheezing or rales.  ABDOMEN: Soft. Bowel sounds are normal. No distension and no mass.  There is no tenderness. There is no rebound and no guarding. No HSM.  MUSCULOSKELETAL: Normal range of motion. No edema and no tenderness. Mild kyphosis,  tenderness  to palpation on her lower thoracic spine  Mobility is limited.  LYMPH NODES: Has no cervical, supraclavicular, axillary and groin adenopathy.   NEUROLOGICAL: Alert and oriented to person, place, and time. No cranial nerve deficit.  Normal muscle tone. Coordination normal.   GENITOURINARY: Deferred exam.  SKIN: Skin is warm and dry. No rash noted. No erythema. No pallor.   EXTREMITIES: No cyanosis, no clubbing, no edema. No Deformity.  PSYCHIATRIC: Normal mood, affect and behavior.  High anxiety due to pain      Lab Results     Recent Results (from the past 240 hour(s))   COVID-19 Virus PCR MRF    Collection Time: 11/03/20  8:00 AM    Specimen: Nasopharyngeal Swab; Respiratory   Result Value Ref Range    COVID-19 VIRUS SPECIMEN SOURCE Nasopharyngeal     2019-nCOV Not Detected    Basic Metabolic Panel    Collection Time: 11/04/20  7:02 AM   Result Value Ref Range    Sodium 140 136 - 145 mmol/L    Potassium 4.0 3.5 - 5.0 mmol/L    Chloride 105 98 - 107 mmol/L    CO2 26 22 - 31 mmol/L    Anion Gap, Calculation 9 5 - 18 mmol/L    Glucose 77 70 - 125 mg/dL    Calcium 9.1 8.5 - 10.5 mg/dL    BUN 34 (H) 8 - 28 mg/dL    Creatinine 1.46 (H) 0.60 - 1.10 mg/dL    GFR MDRD Af Amer 42 (L) >60 mL/min/1.73m2    GFR MDRD Non Af Amer 35  (L) >60 mL/min/1.73m2   COVID-19 Virus PCR MRF    Collection Time: 11/09/20  8:00 AM    Specimen: Respiratory   Result Value Ref Range    COVID-19 VIRUS SPECIMEN SOURCE Nasopharyngeal     2019-nCOV Not Detected                   VASHTI Sanchez

## 2021-06-21 NOTE — LETTER
Letter by Paola Rose MBBS at      Author: Paola Rose MBBS Service: -- Author Type: --    Filed:  Encounter Date: 4/1/2021 Status: (Other)         [facility]  [address] [address]                                  April 1, 2021    Patient: Irene León   MR Number: [id number]   YOB: 1945   Date of Visit: 4/1/2021     Dear Dr. Pérez:    Thank you for referring Irene León to me for evaluation. Below are the relevant portions of my assessment and plan of care.    If you have questions, please do not hesitate to call me. I look forward to following Irene along with you.    Sincerely,        VASHTI Sanchez          CC  No Recipients  Paola Rose MBBS  4/1/2021  2:45 PM  Sign when Signing Visit    [facility] note      Patient: Irene León  MRN: [id number]      [facility] [[id number]]  Reason for Visit     Chief Complaint   Patient presents with   ? H & P       Code Status     Full code    Assessment     Acute patient seen with a creatinine of 1.5 on admission  Mild leukocytosis white count of 11.1  Hypocalcemia persistent with a calcium of 8.2  Mild hyponatremia with a sodium of 135  Chronic anemia hemoglobin 10.1  Severe sepsis with pyelonephritis  E. coli bacteremia  Possible pneumonia currently improved after antibiotic usage  COPD with chronic hypoxic respiratory failure on 2 L of oxygen at baseline  Chronic congestive heart failure with reduced ejection fraction with acute exacerbation  ANATOLIY  Electrolyte abnormalities with low potassium and magnesium  Atrial fibrillation with rapid ventricular response noted in the hospital  History of depression anxiety.  Insomnia.  History of falls.  Hypotension  Acute encephalopathy secondary to sepsis  Generalized weakness  Recurrent hospitalizations- 3 hospital admission noted in last  6 weeks including 1 ICU admission  FTT    Plan     Patient has been discharged to the TCU.  It was felt  that she was not able to take care of her complex medical cares at home with multiple hospitalizations and ER visits noted.  Has been admitted 3 times including to ICU in last 6weeks  She presented with acute sepsis believed to be secondary to UTI.  She was also noted to have pyelonephritis with sepsis and possible pneumonia and was treated with antibiotics.  She had acute metabolic encephalopathy secondary to sepsis which also cleared.  Hospital course complicated by acute on chronic hypoxemic respiratory failure  Currently weaned to 2 L of oxygen.  Continues to have difficulty with respiration and gets short winded easily.  Also complicated by acute CHF exacerbation requiring diuretics.  Close monitoring of weights needed.  She also has lymphedema which is chronic.  Currently discharged on high doses of diuretics  She also developed diarrhea felt to be antibiotic related and discharged on Imodium.  Has chronic pain due to recent history of T12 compression fracture.  Unfortunately narcotics were not very helpful  Hypotension resolved once her losartan was held.  We will need to monitor her blood pressures closely  Xarelto dosage has been reduced as it was not felt to be an appropriate dose for atrial fibrillation.  Patient believes she was taking a higher dose for factor V Leiden  They recommended holding Xarelto and discontinuing it if she continues to fall.  Recheck labs reviewed and she has mild leukocytosis with a white count of 11.1.  Suspect it is from recent hospitalization we will continue to monitor and recheck CBC again.  Recheck UA UC if she has persistent leukocytosis.  Also has renal impairment and continues on high doses of diuretics  At present patient is attributing multiple rehospitalization to volume overload and pulmonary edema which clears her COPD also.  She has significant evidence with lymphedema also.  She will continue with her diuretics and we will not hold her diuretics.  We will hold  Celebrex which is a nephrotoxin  Recheck BMP closely again.  Reassess at my next visit including with weight and fluid status.  Also has mild hyponatremia and hypocalcemia.  Prostat 4 ounce to be added to her daily regimen.  Monitor oral intakes.  Recheck CBC and BMP again closely  Pain management to be optimized at the request.  She has been started on Vistaril.  Voltaren gel.  Topically to her rib pain  Tramadol has been added to her regimen.  We will try to minimize narcotics.  The hospital did report that it was not very effective for her anyway  There is also suspicion the patient may have cognitively declined so she will require close reassessment of cognitive function  Overall patient is noted to have declined significantly with profound physical decline noted since her last TCU stay.  She has continued to be full code.  She is also taking care of her elderly  who has dementia.  I am not sure if she is driving.  I suspect she may need additional services versus looking at placement possibly in assisted living for support to cut down on her hospitalization.  We will have  discussed with family    History     Patient is a very pleasant 76 y.o. female who is admitted to TCU  Patient presented to the hospital with increasing weakness and altered mental status.  She has underlying history of COPD.  sHe is on oxygen.  Unfortunately she ultimately fell on the floor and could not get up and was brought into the hospital  She was admitted with severe sepsis with pyelonephritis and E. coli bacteremia  She was also suspected to have acute pneumonia and these were treated with antibiotics given IV with improvement.  She also was noted to have acute exacerbation of her congestive heart failure.  Echocardiogram shows an EF of 45%.  She was given IV Lasix for diuresis and has been discharged on high doses of oral Lasix.  Her Toprol was also increased.  She developed acute diarrhea felt to be secondary to  antibiotics they have recommended continuing Imodium.  A FIB was stable and xarelto was continued. She had some episodes of A. fib with rapid ventricular response in the hospital.  Dosage has been reduced prior to discharge      Past Medical History     Active Ambulatory (Non-Hospital) Problems    Diagnosis   ? ACP (advance care planning)   ? Exacerbation of intermittent asthma, unspecified asthma severity   ? Acute cystitis without hematuria   ? Factor V Leiden mutation (H)   ? Chronic anticoagulation   ? Essential hypertension, benign   ? Acute systolic congestive heart failure (H)   ? Pain due to fracture   ? High risk medication use   ? Heart failure exacerbated by sotalol (H)   ? T12 compression fracture (H)   ? Acute on chronic diastolic congestive heart failure (H)   ? Metabolic encephalopathy   ? Low back pain   ? Compression fracture of L1 vertebra, sequela   ? Chronic respiratory failure with hypoxia (H)   ? Chronic obstructive pulmonary disease, unspecified COPD type (H)   ? Chronic systolic congestive heart failure (H)   ? Hypoxia   ? S/P hip replacement, left   ? Hip pain, left   ? Acute hypoxemic respiratory failure (H)   ? Chest pain   ? COPD exacerbation (H)   ? Herpes zoster without complication   ? Chest wall pain   ? Benign essential hypertension   ? Dyslipidemia   ? Acute respiratory failure with hypoxia (H)   ? Pulsatile tinnitus of both ears   ? Hot flashes due to tamoxifen   ? Pleural nodules   ? Hot flashes, menopausal   ? Malignant neoplasm of central portion of left female breast (H)   ? CKD (chronic kidney disease) stage 3, GFR 30-59 ml/min   ? Dyspnea   ? Insomnia   ? Leg pain, left   ? Femur fracture (H)   ? GERD (gastroesophageal reflux disease)   ? Post menopausal syndrome   ? OP (osteoporosis)   ? Hypertension   ? Pulmonary emphysema, unspecified emphysema type (H)   ? Hyperlipidemia   ? Centrilobular emphysema (H)   ? Anisocoria   ? OAB (overactive bladder)   ? Factor 5 Leiden  mutation, heterozygous (H)   ? Family history of blood disease   ? Bladder incontinence   ? Physical deconditioning   ? Cardiomyopathy, idiopathic (H)   ? Depression with anxiety   ? Osteoarthritis of hip   ? Herniated intervertebral disc     Past Medical History:   Diagnosis Date   ? ANATOLIY (acute kidney injury) (H)    ? Allergic rhinitis    ? Arrhythmia    ? Atrial fibrillation with RVR (H)    ? Bacteremia    ? Breast cancer (H) 2017   ? Cardiomyopathy (H)    ? Centrilobular emphysema (H)    ? CHF (congestive heart failure) (H)    ? CKD (chronic kidney disease)    ? COPD (chronic obstructive pulmonary disease) (H)    ? Coronary artery disease    ? Depression    ? Dysphagia    ? E. coli sepsis (H)    ? Factor 5 Leiden mutation, heterozygous (H)    ? GERD (gastroesophageal reflux disease)    ? Hx of radiation therapy 2017   ? Hyperlipidemia    ? Hypertension    ? Hypokalemia    ? Hypomagnesemia    ? Idiopathic cardiomyopathy (H)    ? OAB (overactive bladder)    ? Osteoporosis    ? Sacral insufficiency fracture    ? Sinusitis    ? Syncope    ? Urge incontinence    ? Vocal cord dysfunction        Past Social History     Reviewed, and she  reports that she quit smoking about 13 years ago. She quit smokeless tobacco use about 16 years ago. She reports that she does not drink alcohol or use drugs.    Family History     Reviewed, and family history includes Breast cancer in her maternal aunt; Osteoporosis in an other family member; Prostate cancer in her brother and maternal uncle.    Medication List   Post Discharge Medication Reconciliation Status: discharge medications reconciled, continue medications without change   Current Outpatient Medications on File Prior to Visit   Medication Sig Dispense Refill   ? acetaminophen (TYLENOL) 500 MG tablet Take 1,000 mg by mouth 3 (three) times a day.     ? albuterol (PROAIR HFA;PROVENTIL HFA;VENTOLIN HFA) 90 mcg/actuation inhaler Inhale 2 puffs every 4 (four) hours as needed for  wheezing. 1 Inhaler 0   ? ascorbic acid, vitamin C, (VITAMIN C) 1000 MG tablet Take 1,000 mg by mouth daily. Airborne 1 tab daily     ? azelastine (ASTELIN) 137 mcg (0.1 %) nasal spray 2 sprays into each nostril daily.     ? azithromycin (ZITHROMAX) 250 MG tablet Take 1 tablet (250 mg total) by mouth at bedtime. 1 tablet 0   ? benzonatate (TESSALON) 200 MG capsule Take 1 capsule (200 mg total) by mouth 3 (three) times a day as needed for cough. 90 capsule 0   ? celecoxib (CELEBREX) 200 MG capsule Take 200 mg by mouth 2 (two) times a day.     ? chlorhexidine (PERIDEX) 0.12 % solution Apply 15 mL to the mouth or throat at bedtime as needed.      ? cholecalciferol, vitamin D3, 1,000 unit tablet Take 1,000 Units by mouth daily.     ? diclofenac sodium (VOLTAREN) 1 % Gel Apply 1 g topically 3 (three) times a day.      ? EPINEPHrine (EPIPEN/ADRENACLICK/AUVI-Q) 0.3 mg/0.3 mL injection Inject 0.3 mg into the shoulder, thigh, or buttocks.     ? fluticasone (FLONASE) 50 mcg/actuation nasal spray Apply 1 spray into each nostril 2 (two) times a day as needed for allergies.      ? fluticasone-umeclidinium-vilanterol (TRELEGY ELLIPTA) 100-62.5-25 mcg DsDv inhaler Inhale 1 Inhalation daily. 1 each 3   ? furosemide (LASIX) 40 MG tablet Take 1 tablet (40 mg total) by mouth 2 (two) times a day. (Patient taking differently: Take 80 mg by mouth every morning. and 40mg at 2pm) 180 tablet 1   ? gabapentin (NEURONTIN) 300 MG capsule Take 2 capsules (600 mg total) by mouth 2 (two) times a day. 360 capsule 2   ? hydrOXYzine pamoate (VISTARIL) 25 MG capsule Take 25 mg by mouth 3 (three) times a day.      ? ipratropium (ATROVENT HFA) 17 mcg/actuation inhaler Inhale 2 puffs every 6 (six) hours. 3 Inhaler 3   ? loperamide (IMODIUM) 2 mg capsule Take 4 mg by mouth as needed for diarrhea (Every 3 hours as needed for Diarrhea).     ? melatonin 3 mg Tab tablet Take 1 tablet (3 mg total) by mouth at bedtime.  0   ? metoprolol succinate (TOPROL-XL)  50 MG 24 hr tablet Take 100 mg by mouth at bedtime.     ? MYRBETRIQ 50 mg Tb24 ER tablet TAKE 1 TABLET DAILY 90 tablet 3   ? pantoprazole (PROTONIX) 40 MG tablet Take 1 tablet (40 mg total) by mouth daily. 90 tablet 4   ? polyethylene glycol (MIRALAX) 17 gram packet Take 1 packet (17 g total) by mouth daily as needed.  0   ? rivaroxaban ANTICOAGULANT (XARELTO) 15 mg tablet Take 15 mg by mouth daily with supper.     ? simvastatin (ZOCOR) 40 MG tablet Take 1 tablet (40 mg total) by mouth at bedtime. 90 tablet 3   ? sodium chloride (OCEAN) 0.65 % nasal spray 2 sprays into each nostril every 2 (two) hours as needed for congestion.      ? solifenacin (VESICARE) 5 MG tablet Take 1 tablet (5 mg total) by mouth at bedtime. 90 tablet 3   ? tamoxifen (NOLVADEX) 20 MG tablet TAKE 1 TABLET DAILY 90 tablet 3   ? traMADoL (ULTRAM) 50 mg tablet Take 1 tablet (50 mg total) by mouth every 6 (six) hours as needed for pain or severe pain (7-10). 30 tablet 0   ? venlafaxine (EFFEXOR-XR) 150 MG 24 hr capsule TAKE 1 CAPSULE DAILY 90 capsule 3     Current Facility-Administered Medications on File Prior to Visit   Medication Dose Route Frequency Provider Last Rate Last Admin   ? [DISCONTINUED] GENERIC EXTERNAL MEDICATION     GENERIC EXTERNAL DATA PROVIDER           Allergies     Allergies   Allergen Reactions   ? Sulfa (Sulfonamide Antibiotics) Rash     Headaches and dizziness.   ? Homeopathic Drugs Unknown and Other (See Comments)     Comment: runny nose, Comment: runny nose   ? Mold Extracts    ? Morphine (Pf)      hallucinate   ? Lisinopril Cough, Unknown and Itching     Comment: cough, Comment: cough   ? Sulfacetamide Sodium Rash       Review of Systems   A comprehensive review of 14 systems was done. Pertinent findings noted here and in history of present illness. All the rest negative.  Constitutional: Negative.  Negative for fever, chills, she has  activity change, appetite change and fatigue.   HENT: Negative for congestion and  "facial swelling.    Eyes: Negative for photophobia, redness and visual disturbance.   Respiratory: Negative for cough and chest tightness.    Cardiovascular: Negative for chest pain, palpitations and has  leg swelling.   Gastrointestinal: Negative for nausea, diarrhea, constipation, blood in stool and abdominal distention.   Genitourinary: Negative.    Musculoskeletal: Negative.  Reports limited endurance  Skin: Negative.    Neurological: Negative for dizziness, tremors, syncope, weakness, light-headedness and headaches.   Hematological: Does not bruise/bleed easily.   Psychiatric/Behavioral: Negative.        Physical Exam     Recent Vitals 3/31/2021   Height 5' 0\"   Weight 142 lbs 6 oz   BSA (m2) 1.65 m2   /62   Pulse -   Temp 98.1   Temp src -   SpO2 91   Some recent data might be hidden   on 2l    Constitutional: Oriented to person, place, and time and appears well-developed.   HEENT:  Normocephalic and atraumatic.  Eyes: Conjunctivae and EOM are normal. Pupils are equal, round, and reactive to light. No discharge.  No scleral icterus. Nose normal. Mouth/Throat: Oropharynx is clear and moist. No oropharyngeal exudate.    NECK: Normal range of motion. Neck supple. No JVD present. No tracheal deviation present. No thyromegaly present.   CARDIOVASCULAR: Normal rate, regular rhythm and intact distal pulses.  Exam reveals no gallop and no friction rub.  Systolic murmur present.  PULMONARY: Effort normal and breath sounds normal. No respiratory distress.No Wheezing or rales.  faint rales heard  ABDOMEN: Soft. Bowel sounds are normal. No distension and no mass.  There is no tenderness. There is no rebound and no guarding. No HSM.  MUSCULOSKELETAL: Normal range of motion. 2+ LE edema and no tenderness. Mild kyphosis, HAS RIB tenderness.  LYMPH NODES: Has no cervical, supraclavicular, axillary and groin adenopathy.   NEUROLOGICAL: Alert and oriented to person, place, and time. No cranial nerve deficit.  Normal " muscle tone. Coordination normal.   GENITOURINARY: Deferred exam.  SKIN: Skin is warm and dry. No rash noted. No erythema. No pallor.   EXTREMITIES: No cyanosis, no clubbing, 2+le edema. No Deformity.  PSYCHIATRIC: Normal mood, affect and behavior.      Lab Results     Recent Results (from the past 240 hour(s))   COVID-19 VIRUS (CORONAVIRUS) BY PCR - EXTERNAL RESULT    Collection Time: 03/23/21  6:58 PM    Specimen: Nasopharyngeal Swab    Specimen type and source: Swab (Source Required), Nasopharyngeal swab   Result Value Ref Range    COVID-19 Virus by PCR (External Result) Not Detected Not Detected   COVID-19 VIRUS (CORONAVIRUS) BY PCR - EXTERNAL RESULT    Collection Time: 03/30/21 10:27 AM    Specimen: Nasopharyngeal Swab    Specimen type and source: Swab (Source Required), Nasopharyngeal swab   Result Value Ref Range    COVID-19 Virus by PCR (External Result) Not Detected Not Detected            Imaging Results     Xr Ribs Left W Pa Chest    Result Date: 3/9/2021  EXAM: XR RIBS LEFT W PA CHEST LOCATION: [facility] DATE/TIME: 3/9/2021 2:35 PM INDICATION: Left thoracic pain after trauma COMPARISON: Portable chest 02/24/2021     Heart size and vascularity are normal. Shallow inspiration with with bibasilar subsegmental atelectasis. Old healed eighth through 10th rib fractures redemonstrated. No definite acute rib fracture. No pneumothorax nor pleural effusion.     Xr Ankle Left 3 Or More Vws    Result Date: 3/9/2021  EXAM: XR ANKLE LEFT 3 OR MORE VWS LOCATION: [facility] DATE/TIME: 3/9/2021 2:36 PM INDICATION: left lateral malleolar discomfort after she rolled it 3 days ago COMPARISON: None.     No fracture. Ankle mortise is intact. Mild soft tissue swelling around the ankle.    Xr Foot Left 3 Or More Vws    Result Date: 3/9/2021  EXAM: XR FOOT LEFT 3 OR MORE VWS LOCATION: [facility] DATE/TIME: 3/9/2021 2:38 PM INDICATION: left foot pain  after rolled left foot/ankle COMPARISON: None.     No fracture. No degenerative changes. Mild soft tissue swelling overlying the dorsal aspect of the metatarsal shafts.          Electronically signed by  VASHTI Sanchez

## 2021-06-21 NOTE — LETTER
Letter by Paola Rose MBBS at      Author: Paola Rose MBBS Service: -- Author Type: --    Filed:  Encounter Date: 11/30/2020 Status: (Other)         Patient: Irene León   MR Number: [id number]   YOB: 1945   Date of Visit: 11/30/2020       [facility] Admission note      Patient: Irene León  MRN: [id number]  Date of Service: 11/30/2020      [facility] [[id number]]  Reason for Visit     Chief Complaint   Patient presents with   ? Discharge Summary   F/U PAIN /MUSCLE SPASMS/ chf    Code Status     FULL CODE    Assessment     -T12 compression fracture  -ANATOLIY with elevated creatinine 1.4 in the TCU  - acute CHF now stable on bumex  -Osteoporosis restarted on Prolia  -Pain management no longer on narcotics; no discharge on narcotics including Percocet  -Acute metabolic encephalopathy/delirium felt to be secondary to opioid medication  -CHF with reduced ejection fraction acute exacerbation  -- ANATOLIY /cardiorenal syndrome  -History of COPD and asthma  -Acute on chronic hypoxic respiratory failure ON 3L BY NC  -Peripheral arterial disease  -History of factor V Leiden and occipital stroke on Xarelto  -History of breast cancer  -Concern for mild dementia with baseline cognitive impairment  Slums 13/30 r/c 23/30  -Generalized weakness             History     Patient is a very pleasant 75 y.o. female who is admitted to TCU.  Admitted with a T12 compression fracture  Had a very prolonged lengthy course in the TCU and was given a TLSO brace currently not wearing one.  Pain management remains her biggest challenge in the TCU.  She was on scheduled Tylenol along with Norco.  She was given muscle relaxers and eventually switched to Vistaril.  She was on Celebrex as well as gabapentin along with calcitonin nasal spray and topical Voltaren gel.  Therapy also tried various modalities for pain control.  Patient was also having piriformis syndrome on her right side.  Her pain was  generally very intense and she was given orthopedic referral for requested she go in for kyphoplasty.  Her pain has now improved significantly on its own spontaneously.  She is no longer on Celebrex.  Vistaril will be discontinued at discharge and so will be [place] because of daughter's concerns regarding addiction but she tolerated a taper of her Norco quite well.  Overall she has done quite well.  There is also concern about cognitive impairment she was on Seroquel which was discontinued in the TCU her mood and behaviors overall have been stable.  Her initial cognitive testing scores including a slums is 23/30.  She had another issue with profound weight gain and CHF exacerbation in the TCU.  Initially this was treated with a higher dose of diuretics she did not respond to that she was switched to Bumex with improvement.  She is going to be discharging on Bumex 2 mg daily      Past Medical History     Active Ambulatory (Non-Hospital) Problems    Diagnosis   ? Pain due to fracture   ? High risk medication use   ? Heart failure exacerbated by sotalol (H)   ? T12 compression fracture (H)   ? Acute on chronic diastolic congestive heart failure (H)   ? Metabolic encephalopathy   ? Low back pain   ? Compression fracture of L1 vertebra, sequela   ? Chronic respiratory failure with hypoxia (H)   ? Chronic obstructive pulmonary disease, unspecified COPD type (H)   ? Chronic systolic congestive heart failure (H)   ? Hypoxia   ? S/P hip replacement, left   ? Hip pain, left   ? Acute hypoxemic respiratory failure (H)   ? Chest pain   ? COPD exacerbation (H)   ? Herpes zoster without complication   ? Chest wall pain   ? Benign essential hypertension   ? Dyslipidemia   ? Acute respiratory failure with hypoxia (H)   ? Pulsatile tinnitus of both ears   ? Hot flashes due to tamoxifen   ? Pleural nodules   ? Hot flashes, menopausal   ? Malignant neoplasm of central portion of left female breast (H)   ? CKD (chronic kidney disease)  stage 3, GFR 30-59 ml/min   ? Dyspnea   ? Insomnia   ? Leg pain, left   ? Femur fracture (H)   ? GERD (gastroesophageal reflux disease)   ? Post menopausal syndrome   ? OP (osteoporosis)   ? Hypertension   ? Pulmonary emphysema, unspecified emphysema type (H)   ? Hyperlipidemia   ? Centrilobular emphysema (H)   ? Anisocoria   ? OAB (overactive bladder)   ? Factor 5 Leiden mutation, heterozygous (H)   ? Family history of blood disease   ? Bladder incontinence   ? Physical deconditioning   ? Cardiomyopathy, idiopathic (H)   ? Depression with anxiety   ? Osteoarthritis of hip   ? Herniated intervertebral disc     Past Medical History:   Diagnosis Date   ? Arrhythmia    ? Breast cancer (H) 2017   ? CHF (congestive heart failure) (H)    ? COPD (chronic obstructive pulmonary disease) (H)    ? Coronary artery disease    ? GERD (gastroesophageal reflux disease)    ? Hx of radiation therapy 2017   ? Hyperlipidemia    ? Hypertension    ? Idiopathic cardiomyopathy (H)        Past Social History     Reviewed, and she  reports that she quit smoking about 13 years ago. She quit smokeless tobacco use about 16 years ago. She reports that she does not drink alcohol or use drugs.    Family History     Reviewed, and family history includes Breast cancer in her maternal aunt; Osteoporosis in an other family member; Prostate cancer in her brother and maternal uncle.    Medication List   Post Discharge Medication Reconciliation Status: discharge medications reconciled, continue medications without change   Current Outpatient Medications on File Prior to Visit   Medication Sig Dispense Refill   ? bumetanide (BUMEX) 2 MG tablet Take 2 mg by mouth daily.     ? acetaminophen (TYLENOL) 325 MG tablet Take 2 tablets (650 mg total) by mouth 3 (three) times a day. (Patient taking differently: Take 650 mg by mouth 4 (four) times a day. )  0   ? ADVAIR DISKUS 500-50 mcg/dose DISKUS USE 1 INHALATION TWICE A DAY 60 each 11   ? albuterol (PROAIR  HFA;PROVENTIL HFA;VENTOLIN HFA) 90 mcg/actuation inhaler Inhale 2 puffs every 4 (four) hours as needed for wheezing. 1 Inhaler 0   ? ascorbic acid, vitamin C, (VITAMIN C) 1000 MG tablet Take 1,000 mg by mouth daily. Airborne 1 tab daily     ? aspirin 81 MG EC tablet Take 81 mg by mouth daily.     ? azelastine (ASTELIN) 137 mcg (0.1 %) nasal spray 2 sprays into each nostril daily.     ? benzonatate (TESSALON) 200 MG capsule Take 1 capsule (200 mg total) by mouth 3 (three) times a day as needed for cough. 90 capsule 2   ? chlorhexidine (PERIDEX) 0.12 % solution Apply 15 mL to the mouth or throat at bedtime as needed.      ? cholecalciferol, vitamin D3, 1,000 unit tablet Take 1,000 Units by mouth daily.     ? diclofenac sodium (VOLTAREN) 1 % Gel Apply 4 g to area of pain in low back 4 times daily  0   ? EPINEPHrine (EPIPEN/ADRENACLICK/AUVI-Q) 0.3 mg/0.3 mL injection Inject 0.3 mg into the shoulder, thigh, or buttocks.     ? famotidine (PEPCID) 20 MG tablet Take 20 mg by mouth daily.     ? fluticasone (FLONASE) 50 mcg/actuation nasal spray Apply 1 spray into each nostril as needed.      ? gabapentin (NEURONTIN) 300 MG capsule Take 2 capsules (600 mg total) by mouth 2 (two) times a day. 360 capsule 2   ? ipratropium (ATROVENT HFA) 17 mcg/actuation inhaler Inhale 2 puffs every 6 (six) hours. 3 Inhaler 3   ? ipratropium-albuterol (DUO-NEB) 0.5-2.5 mg/3 mL nebulizer 1 neb 4 times a day until seen by primary and then as needed or as directed 60 vial 1   ? lidocaine (XYLOCAINE) 5 % ointment Apply to area of pain in low back 4 times daily 35.44 g 0   ? losartan (COZAAR) 50 MG tablet TAKE 1 TABLET DAILY 90 tablet 3   ? melatonin 3 mg Tab tablet Take 1 tablet (3 mg total) by mouth at bedtime.  0   ? metoprolol succinate (TOPROL-XL) 50 MG 24 hr tablet Take 1.5 tablets (75 mg total) by mouth daily. 135 tablet 3   ? mirabegron (MYRBETRIQ) 50 mg Tb24 ER tablet Take 1 tablet (50 mg total) by mouth daily. 90 tablet 3   ? omalizumab  (XOLAIR) 150 mg/mL Syrg injection Inject 300 mg under the skin every 14 (fourteen) days.      ? pantoprazole (PROTONIX) 40 MG tablet Take 1 tablet (40 mg total) by mouth daily. 90 tablet 4   ? polyethylene glycol (MIRALAX) 17 gram packet Take 1 packet (17 g total) by mouth daily as needed.  0   ? rivaroxaban ANTICOAGULANT (XARELTO) 20 mg tablet Take 20 mg by mouth at bedtime.     ? senna-docusate (PERICOLACE) 8.6-50 mg tablet Take 1 tablet by mouth daily as needed for constipation.  0   ? simvastatin (ZOCOR) 40 MG tablet Take 1 tablet (40 mg total) by mouth at bedtime. 90 tablet 3   ? sodium chloride (OCEAN) 0.65 % nasal spray 2 sprays into each nostril as needed.     ? solifenacin (VESICARE) 5 MG tablet Take 1 tablet (5 mg total) by mouth at bedtime. 90 tablet 3   ? tamoxifen (NOLVADEX) 20 MG tablet TAKE 1 TABLET DAILY 90 tablet 4   ? traZODone (DESYREL) 50 MG tablet TAKE 1 TABLET(50 MG) BY MOUTH AT BEDTIME AS NEEDED 90 tablet 2   ? triamcinolone (KENALOG) 0.1 % cream Apply to rash on arms twice daily for one week 30 g 0   ? triamcinolone (KENALOG) 0.5 % ointment Apply o foot twice a day 30 g 1   ? venlafaxine (EFFEXOR-XR) 150 MG 24 hr capsule TAKE 1 CAPSULE DAILY 90 capsule 4   ? [DISCONTINUED] furosemide (LASIX) 40 MG tablet Take 1 tablet (40 mg total) by mouth 2 (two) times a day at 9am and 6pm.  0   ? [DISCONTINUED] HYDROcodone-acetaminophen 5-325 mg per tablet Take 1 tablet by mouth every 4 (four) hours as needed. 90 tablet 0   ? [DISCONTINUED] QUEtiapine (SEROQUEL) 25 MG tablet Take 0.5 tablets (12.5 mg total) by mouth 3 (three) times a day as needed (Agitation that is not redirectable.).  0     Current Facility-Administered Medications on File Prior to Visit   Medication Dose Route Frequency Provider Last Rate Last Admin   ? denosumab 60 mg (PROLIA 60 mg/ml)  60 mg Subcutaneous Q6 Months Suzanna Shane MD   60 mg at 02/04/20 1352       Allergies     Allergies   Allergen Reactions   ? Sulfa (Sulfonamide  Antibiotics) Rash     Headaches and dizziness.   ? Homeopathic Drugs Unknown and Other (See Comments)     Comment: runny nose, Comment: runny nose   ? Mold Extracts    ? Morphine (Pf)      hallucinate   ? Lisinopril Cough, Unknown and Itching     Comment: cough, Comment: cough   ? Sulfacetamide Sodium Rash       Review of Systems   A comprehensive review of 14 systems was done. Pertinent findings noted here and in history of present illness. All the rest negative.  Constitutional: Negative.  Negative for fever, chills, she has activity change, appetite change and fatigue.   HENT: Negative for congestion and facial swelling.    Eyes: Negative for photophobia, redness and visual disturbance.   Respiratory: Negative for cough and chest tightness.    Cardiovascular: Negative for chest pain, palpitations and leg swelling.   Gastrointestinal: Negative for nausea, diarrhea, constipation, blood in stool and abdominal distention.   Genitourinary: Negative.    Musculoskeletal: Negative.  Pain in her low back; today reporting  muscle spasms which are  improving  Has piriformis pain which also feels better  Skin: Negative.    Neurological: Negative for dizziness, tremors, syncope, weakness, light-headedness and headaches.   Hematological: Does not bruise/bleed easily.   Psychiatric/Behavioral: Negative.  Significant distress due to pain is improved and feels much better      Physical Exam     Recent Vitals 10/24/2020   Height -   Weight -   BSA (m2) -   /88   Pulse 85   Temp 97.5   Temp src 1   SpO2 92   Some recent data might be hidden     Currently on 2 L of oxygen by nasal cannula wt 142lb  R/C /67  Constitutional: Oriented to person, place, and time and appears well-developed.   HEENT:  Normocephalic and atraumatic.  Eyes: Conjunctivae and EOM are normal. Pupils are equal, round, and reactive to light. No discharge.  No scleral icterus. Nose normal. Mouth/Throat: Oropharynx is clear and moist. No oropharyngeal  exudate.    NECK: Normal range of motion. Neck supple. No JVD present. No tracheal deviation present. No thyromegaly present.   CARDIOVASCULAR: Normal rate, regular rhythm and intact distal pulses.  Exam reveals no gallop and no friction rub.  Systolic murmur present.  PULMONARY: Effort normal and breath sounds normal. No respiratory distress.No Wheezing or rales.  ABDOMEN: Soft. Bowel sounds are normal. No distension and no mass.  There is no tenderness. There is no rebound and no guarding. No HSM.  MUSCULOSKELETAL: Normal range of motion. No edema and no tenderness. Mild kyphosis, no tenderness.  Mobility in the bed has improved significantly  LYMPH NODES: Has no cervical, supraclavicular, axillary and groin adenopathy.   NEUROLOGICAL: Alert and oriented to person, place, and time. No cranial nerve deficit.  Normal muscle tone. Coordination normal.   GENITOURINARY: Deferred exam.  SKIN: Skin is warm and dry. No rash noted. No erythema. No pallor.   EXTREMITIES: No cyanosis, no clubbing, no edema. No Deformity.  PSYCHIATRIC: Normal mood, affect and behavior.        Lab Results     Recent Results (from the past 240 hour(s))   COVID-19 Virus PCR MRF    Collection Time: 11/24/20  8:00 AM    Specimen: Respiratory   Result Value Ref Range    COVID-19 VIRUS SPECIMEN SOURCE Nasopharyngeal     2019-nCOV Not Detected       Results for orders placed or performed in visit on 11/18/20   Basic Metabolic Panel   Result Value Ref Range    Sodium 140 136 - 145 mmol/L    Potassium 4.0 3.5 - 5.0 mmol/L    Chloride 103 98 - 107 mmol/L    CO2 23 22 - 31 mmol/L    Anion Gap, Calculation 14 5 - 18 mmol/L    Glucose 70 70 - 125 mg/dL    Calcium 9.4 8.5 - 10.5 mg/dL    BUN 17 8 - 28 mg/dL    Creatinine 1.19 (H) 0.60 - 1.10 mg/dL    GFR MDRD Af Amer 54 (L) >60 mL/min/1.73m2    GFR MDRD Non Af Amer 44 (L) >60 mL/min/1.73m2     MEDICAL EQUIPMENT NEEDS:  [place]; home o2     DISCHARGE PLAN/FACE TO FACE:  I certify that services are/were  furnished while this patient was under the care of a physician and that a physician or an allowed non-physician practitioner (NPP), had a face-to-face encounter that meets the physician face-to-face encounter requirements. The encounter was in whole, or in part, related to the primary reason for home health. The patient is confined to his/her home and needs intermittent skilled nursing, physical therapy, speech-language pathology, or the continued need for occupational therapy. A plan of care has been established by a physician and is periodically reviewed by a physician.  Date of Face-to-Face Encounter: 11/30/20     I certify that, based on my findings, the following services are medically necessary home health services: [facility] HHA/RN and PT/OT to evaluate and treat    My clinical findings support the need for the above skilled services because: patient will be discharging to home. Patient will need assistance with medication management and performing IADLs and ADLs effectively and safely.     Patient to re-establish plan of care with their PCP within 7 days after leaving TCU.               VASHTI Sanchez

## 2021-06-21 NOTE — LETTER
Letter by Paola Rose MBBS at      Author: Paola Rose MBBS Service: -- Author Type: --    Filed:  Encounter Date: 11/25/2020 Status: (Other)         Patient: Irene León   MR Number: [id number]   YOB: 1945   Date of Visit: 11/25/2020       [facility] Admission note      Patient: Irene León  MRN: [id number]  Date of Service: 11/25/2020      [facility] [[id number]]  Reason for Visit     Chief Complaint   Patient presents with   ? Review Of Multiple Medical Conditions   F/U PAIN /MUSCLE SPASMS/ chf    Code Status     FULL CODE    Assessment     -Acute CHF exacerbation with persistent weight gain in spite of being on a higher dose of Lasix; now improved  -Ongoing concerns about cough and congestion with hypoxic respiratory failure  - pain management  -T12 compression fracture  -ANATOLIY with elevated creatinine 1.4 in the TCU  -Osteoporosis restarted on Prolia  -Pain management no longer on narcotics; no discharge on narcotics including Percocet  -Acute metabolic encephalopathy/delirium felt to be secondary to opioid medication  -CHF with reduced ejection fraction acute exacerbation  -- ANATOLIY /cardiorenal syndrome  -History of COPD and asthma  -Acute on chronic hypoxic respiratory failure ON 3L BY NC  -Peripheral arterial disease  -History of factor V Leiden and occipital stroke on Xarelto  -History of breast cancer  -Concern for mild dementia with baseline cognitive impairment  Slums 13/30  -Generalized weakness      Plan     Patient has been admitted to the TCU for strengthening and rehab  Patient continues to have issues with pain management with intractable low back and hip pain.  She has done a follow-up with orthopedics and has been given a referral for kyphoplasty.  Patient actually is reporting a significant improvement in pain and improvement in mobility  She remains on scheduled Tylenol.  Celebrex has been discontinued.  This was done unfortunately due to a  nursing error and it was stopped earlier than requested.  She is on a lower dose of Norco every 8 hours instead of every 4 and is sparingly requesting it.  She reports an improvement and is now debating if she should go in for a kyphoplasty.  Encourage compliance with oxygen frequently seen sitting at the floor.  Weight stable currently on Bumex 2 mg for maintenance.  Recheck BMP stable.  Now pushing for an early discharge home         History     Patient is a very pleasant 75 y.o. female who is admitted to TCU.  Admitted with a T12 compression fracture  She has been discharged weightbearing as tolerated with a TLSO brace to be worn all day  She did have a follow-up with orthopedics.  Based on the recommendation she was given an appointment to follow-up with orthopedics for kyphoplasty and injection in her piriformis.  Her daughter is trying to schedule it.  Her brace has been adjusted   Overall patient is ambulating better with improvement in bed mobility noted.  Pain management reviewed with her.  Recently her Celebrex was discontinued due to a nursing error.  In addition she is no longer on high doses of Norco and has tolerated a reduction to every 8 hours from every 4 hours quite well.  She is on scheduled Tylenol.  Overall reporting a significant improvement in her pain.  She also had recent exacerbation of CHF for which she was given high doses of Bumex.  BMPs have been stable she is reporting her cough and congestion have improved.  Now down to 142 pounds which is almost close to 8 pound weight loss  She is back to her baseline oxygen needs which is 2 to 3 L.  Cognitively she has some impairment but overall has been stable she appears to be mildly forgetful.  Physically she is to build feels she is walking better.  She feels she is ready to go home and would like to go home soon.  She does take care of her  who has mild dementia as per her      Past Medical History     Active Ambulatory (Non-Hospital)  Problems    Diagnosis   ? Pain due to fracture   ? High risk medication use   ? Heart failure exacerbated by sotalol (H)   ? T12 compression fracture (H)   ? Acute on chronic diastolic congestive heart failure (H)   ? Metabolic encephalopathy   ? Low back pain   ? Compression fracture of L1 vertebra, sequela   ? Chronic respiratory failure with hypoxia (H)   ? Chronic obstructive pulmonary disease, unspecified COPD type (H)   ? Chronic systolic congestive heart failure (H)   ? Hypoxia   ? S/P hip replacement, left   ? Hip pain, left   ? Acute hypoxemic respiratory failure (H)   ? Chest pain   ? COPD exacerbation (H)   ? Herpes zoster without complication   ? Chest wall pain   ? Benign essential hypertension   ? Dyslipidemia   ? Acute respiratory failure with hypoxia (H)   ? Pulsatile tinnitus of both ears   ? Hot flashes due to tamoxifen   ? Pleural nodules   ? Hot flashes, menopausal   ? Malignant neoplasm of central portion of left female breast (H)   ? CKD (chronic kidney disease) stage 3, GFR 30-59 ml/min   ? Dyspnea   ? Insomnia   ? Leg pain, left   ? Femur fracture (H)   ? GERD (gastroesophageal reflux disease)   ? Post menopausal syndrome   ? OP (osteoporosis)   ? Hypertension   ? Pulmonary emphysema, unspecified emphysema type (H)   ? Hyperlipidemia   ? Centrilobular emphysema (H)   ? Anisocoria   ? OAB (overactive bladder)   ? Factor 5 Leiden mutation, heterozygous (H)   ? Family history of blood disease   ? Bladder incontinence   ? Physical deconditioning   ? Cardiomyopathy, idiopathic (H)   ? Depression with anxiety   ? Osteoarthritis of hip   ? Herniated intervertebral disc     Past Medical History:   Diagnosis Date   ? Arrhythmia    ? Breast cancer (H) 2017   ? CHF (congestive heart failure) (H)    ? COPD (chronic obstructive pulmonary disease) (H)    ? Coronary artery disease    ? GERD (gastroesophageal reflux disease)    ? Hx of radiation therapy 2017   ? Hyperlipidemia    ? Hypertension    ?  Idiopathic cardiomyopathy (H)        Past Social History     Reviewed, and she  reports that she quit smoking about 13 years ago. She quit smokeless tobacco use about 16 years ago. She reports that she does not drink alcohol or use drugs.    Family History     Reviewed, and family history includes Breast cancer in her maternal aunt; Osteoporosis in an other family member; Prostate cancer in her brother and maternal uncle.    Medication List   Post Discharge Medication Reconciliation Status: discharge medications reconciled, continue medications without change   Current Outpatient Medications on File Prior to Visit   Medication Sig Dispense Refill   ? acetaminophen (TYLENOL) 325 MG tablet Take 2 tablets (650 mg total) by mouth 3 (three) times a day. (Patient taking differently: Take 650 mg by mouth 4 (four) times a day. )  0   ? ADVAIR DISKUS 500-50 mcg/dose DISKUS USE 1 INHALATION TWICE A DAY 60 each 11   ? albuterol (PROAIR HFA;PROVENTIL HFA;VENTOLIN HFA) 90 mcg/actuation inhaler Inhale 2 puffs every 4 (four) hours as needed for wheezing. 1 Inhaler 0   ? ascorbic acid, vitamin C, (VITAMIN C) 1000 MG tablet Take 1,000 mg by mouth daily. Airborne 1 tab daily     ? aspirin 81 MG EC tablet Take 81 mg by mouth daily.     ? azelastine (ASTELIN) 137 mcg (0.1 %) nasal spray 2 sprays into each nostril daily.     ? benzonatate (TESSALON) 200 MG capsule Take 1 capsule (200 mg total) by mouth 3 (three) times a day as needed for cough. 90 capsule 2   ? chlorhexidine (PERIDEX) 0.12 % solution Apply 15 mL to the mouth or throat at bedtime as needed.      ? cholecalciferol, vitamin D3, 1,000 unit tablet Take 1,000 Units by mouth daily.     ? diclofenac sodium (VOLTAREN) 1 % Gel Apply 4 g to area of pain in low back 4 times daily  0   ? EPINEPHrine (EPIPEN/ADRENACLICK/AUVI-Q) 0.3 mg/0.3 mL injection Inject 0.3 mg into the shoulder, thigh, or buttocks.     ? famotidine (PEPCID) 20 MG tablet Take 20 mg by mouth daily.     ?  fluticasone (FLONASE) 50 mcg/actuation nasal spray Apply 1 spray into each nostril as needed.      ? furosemide (LASIX) 40 MG tablet Take 1 tablet (40 mg total) by mouth 2 (two) times a day at 9am and 6pm.  0   ? gabapentin (NEURONTIN) 300 MG capsule Take 2 capsules (600 mg total) by mouth 2 (two) times a day. 360 capsule 2   ? HYDROcodone-acetaminophen 5-325 mg per tablet Take 1 tablet by mouth every 4 (four) hours as needed. 90 tablet 0   ? ipratropium (ATROVENT HFA) 17 mcg/actuation inhaler Inhale 2 puffs every 6 (six) hours. 3 Inhaler 3   ? ipratropium-albuterol (DUO-NEB) 0.5-2.5 mg/3 mL nebulizer 1 neb 4 times a day until seen by primary and then as needed or as directed 60 vial 1   ? lidocaine (XYLOCAINE) 5 % ointment Apply to area of pain in low back 4 times daily 35.44 g 0   ? losartan (COZAAR) 50 MG tablet TAKE 1 TABLET DAILY 90 tablet 3   ? melatonin 3 mg Tab tablet Take 1 tablet (3 mg total) by mouth at bedtime.  0   ? metoprolol succinate (TOPROL-XL) 50 MG 24 hr tablet Take 1.5 tablets (75 mg total) by mouth daily. 135 tablet 3   ? mirabegron (MYRBETRIQ) 50 mg Tb24 ER tablet Take 1 tablet (50 mg total) by mouth daily. 90 tablet 3   ? omalizumab (XOLAIR) 150 mg/mL Syrg injection Inject 300 mg under the skin every 14 (fourteen) days.      ? pantoprazole (PROTONIX) 40 MG tablet Take 1 tablet (40 mg total) by mouth daily. 90 tablet 4   ? polyethylene glycol (MIRALAX) 17 gram packet Take 1 packet (17 g total) by mouth daily as needed.  0   ? QUEtiapine (SEROQUEL) 25 MG tablet Take 0.5 tablets (12.5 mg total) by mouth 3 (three) times a day as needed (Agitation that is not redirectable.).  0   ? rivaroxaban ANTICOAGULANT (XARELTO) 20 mg tablet Take 20 mg by mouth at bedtime.     ? senna-docusate (PERICOLACE) 8.6-50 mg tablet Take 1 tablet by mouth daily as needed for constipation.  0   ? simvastatin (ZOCOR) 40 MG tablet Take 1 tablet (40 mg total) by mouth at bedtime. 90 tablet 3   ? sodium chloride (OCEAN)  0.65 % nasal spray 2 sprays into each nostril as needed.     ? solifenacin (VESICARE) 5 MG tablet Take 1 tablet (5 mg total) by mouth at bedtime. 90 tablet 3   ? tamoxifen (NOLVADEX) 20 MG tablet TAKE 1 TABLET DAILY 90 tablet 4   ? traZODone (DESYREL) 50 MG tablet TAKE 1 TABLET(50 MG) BY MOUTH AT BEDTIME AS NEEDED 90 tablet 2   ? triamcinolone (KENALOG) 0.1 % cream Apply to rash on arms twice daily for one week 30 g 0   ? triamcinolone (KENALOG) 0.5 % ointment Apply o foot twice a day 30 g 1   ? venlafaxine (EFFEXOR-XR) 150 MG 24 hr capsule TAKE 1 CAPSULE DAILY 90 capsule 4     Current Facility-Administered Medications on File Prior to Visit   Medication Dose Route Frequency Provider Last Rate Last Admin   ? denosumab 60 mg (PROLIA 60 mg/ml)  60 mg Subcutaneous Q6 Months Suzanna Shane MD   60 mg at 02/04/20 6052       Allergies     Allergies   Allergen Reactions   ? Sulfa (Sulfonamide Antibiotics) Rash     Headaches and dizziness.   ? Homeopathic Drugs Unknown and Other (See Comments)     Comment: runny nose, Comment: runny nose   ? Mold Extracts    ? Morphine (Pf)      hallucinate   ? Lisinopril Cough, Unknown and Itching     Comment: cough, Comment: cough   ? Sulfacetamide Sodium Rash       Review of Systems   A comprehensive review of 14 systems was done. Pertinent findings noted here and in history of present illness. All the rest negative.  Constitutional: Negative.  Negative for fever, chills, she has activity change, appetite change and fatigue.   HENT: Negative for congestion and facial swelling.    Eyes: Negative for photophobia, redness and visual disturbance.   Respiratory: Negative for cough and chest tightness.    Cardiovascular: Negative for chest pain, palpitations and leg swelling.   Gastrointestinal: Negative for nausea, diarrhea, constipation, blood in stool and abdominal distention.   Genitourinary: Negative.    Musculoskeletal: Negative.  Pain in her low back; today reporting  muscle spasms  which are  improving  Has piriformis pain which also feels better  Skin: Negative.    Neurological: Negative for dizziness, tremors, syncope, weakness, light-headedness and headaches.   Hematological: Does not bruise/bleed easily.   Psychiatric/Behavioral: Negative.  Significant distress due to pain is improved and feels much better      Physical Exam     Recent Vitals 10/24/2020   Height -   Weight -   BSA (m2) -   /88   Pulse 85   Temp 97.5   Temp src 1   SpO2 92   Some recent data might be hidden     Currently on 2 L of oxygen by nasal cannula wt 142lb  R/C /67  Constitutional: Oriented to person, place, and time and appears well-developed.   HEENT:  Normocephalic and atraumatic.  Eyes: Conjunctivae and EOM are normal. Pupils are equal, round, and reactive to light. No discharge.  No scleral icterus. Nose normal. Mouth/Throat: Oropharynx is clear and moist. No oropharyngeal exudate.    NECK: Normal range of motion. Neck supple. No JVD present. No tracheal deviation present. No thyromegaly present.   CARDIOVASCULAR: Normal rate, regular rhythm and intact distal pulses.  Exam reveals no gallop and no friction rub.  Systolic murmur present.  PULMONARY: Effort normal and breath sounds normal. No respiratory distress.No Wheezing or rales.  ABDOMEN: Soft. Bowel sounds are normal. No distension and no mass.  There is no tenderness. There is no rebound and no guarding. No HSM.  MUSCULOSKELETAL: Normal range of motion. No edema and no tenderness. Mild kyphosis, no tenderness.  Mobility in the bed has improved significantly  LYMPH NODES: Has no cervical, supraclavicular, axillary and groin adenopathy.   NEUROLOGICAL: Alert and oriented to person, place, and time. No cranial nerve deficit.  Normal muscle tone. Coordination normal.   GENITOURINARY: Deferred exam.  SKIN: Skin is warm and dry. No rash noted. No erythema. No pallor.   EXTREMITIES: No cyanosis, no clubbing, no edema. No Deformity.  PSYCHIATRIC:  Normal mood, affect and behavior.        Lab Results     Recent Results (from the past 240 hour(s))   COVID-19 Virus PCR MRF    Collection Time: 11/17/20  7:00 AM    Specimen: Respiratory   Result Value Ref Range    COVID-19 VIRUS SPECIMEN SOURCE Nasopharyngeal     2019-nCOV Not Detected    Basic Metabolic Panel    Collection Time: 11/18/20 12:10 PM   Result Value Ref Range    Sodium 140 136 - 145 mmol/L    Potassium 4.0 3.5 - 5.0 mmol/L    Chloride 103 98 - 107 mmol/L    CO2 23 22 - 31 mmol/L    Anion Gap, Calculation 14 5 - 18 mmol/L    Glucose 70 70 - 125 mg/dL    Calcium 9.4 8.5 - 10.5 mg/dL    BUN 17 8 - 28 mg/dL    Creatinine 1.19 (H) 0.60 - 1.10 mg/dL    GFR MDRD Af Amer 54 (L) >60 mL/min/1.73m2    GFR MDRD Non Af Amer 44 (L) >60 mL/min/1.73m2                  VASHTI Sanchez

## 2021-06-21 NOTE — LETTER
Letter by Paola Rose MBBS at      Author: Paola Rose MBBS Service: -- Author Type: --    Filed:  Encounter Date: 11/23/2020 Status: (Other)         Patient: Irene León   MR Number: [id number]   YOB: 1945   Date of Visit: 11/23/2020       [facility] Admission note      Patient: Irene León  MRN: [id number]  Date of Service: 11/23/2020      [facility] [[id number]]  Reason for Visit     Chief Complaint   Patient presents with   ? Review Of Multiple Medical Conditions   F/U PAIN /MUSCLE SPASMS/ chf    Code Status     FULL CODE    Assessment     -Acute CHF exacerbation with persistent weight gain in spite of being on a higher dose of Lasix; now improved  -Ongoing concerns about cough and congestion with hypoxic respiratory failure  - pain management  -T12 compression fracture  -ANATOLIY with elevated creatinine 1.4 in the TCU  -Osteoporosis restarted on Prolia  -Pain management no longer on narcotics; no discharge on narcotics including Percocet  -Acute metabolic encephalopathy/delirium felt to be secondary to opioid medication  -CHF with reduced ejection fraction acute exacerbation  -- ANATOLIY /cardiorenal syndrome  -History of COPD and asthma  -Acute on chronic hypoxic respiratory failure ON 3L BY NC  -Peripheral arterial disease  -History of factor V Leiden and occipital stroke on Xarelto  -History of breast cancer  -Concern for mild dementia with baseline cognitive impairment  Slums 13/30  -Generalized weakness      Plan     Patient has been admitted to the TCU for strengthening and rehab  Patient continues to have issues with pain management with intractable low back and hip pain.  She has done a follow-up with orthopedics and has been given a referral for kyphoplasty.  Now reporting an improvement in pain and not sure if she wants the procedure  Discontinue norco q 4hrs prn  Start norco 5/325 q 8hrs prn  Discontinue celebrex after another 10d  Discontinue vistaril  after another 7d  Monitor  Response to change in medication  Cont bumex 2mg daily - wt stable at 142lb  Cont o2 - baseline ; no weaning  Discharge plan discussed- wants to go home this weekend  Discuss with SW         History     Patient is a very pleasant 75 y.o. female who is admitted to TCU.  Admitted with a T12 compression fracture  She has been discharged weightbearing as tolerated with a TLSO brace to be worn all day  She did have a follow-up with orthopedics.  Based on the recommendation she was given an appointment to follow-up with orthopedics for kyphoplasty and injection in her piriformis.  Her daughter is trying to schedule it.  Her brace has been adjusted she does admit that she is feeling much better and is not sure if she wants to keep those appointments for kyphoplasty.  She feels that if she is not having any pain she would not like to go through any unnecessary procedures  Pain management reviewed   She does report that her pain has improved significantly.  Narcotic intake was reviewed with nursing were reporting that she is taking maybe 1 or 2 narcotic pills in the morning primarily.  They overall feel that her pain is also improved significantly she is on multiple medications for pain management  She also had recent exacerbation of CHF for which she was given high doses of Bumex.  BMPs have been stable she is reporting her cough and congestion have improved.  Now down to 142 pounds which is almost close to 8 pound weight loss  She is back to her baseline oxygen needs which is 2 to 3 L.  Cognitively she has some impairment but overall has been stable she appears to be mildly forgetful.  Physically remains quite debilitated and wheelchair-bound  She now feels that she is that she is ready to discharge home      Past Medical History     Active Ambulatory (Non-Hospital) Problems    Diagnosis   ? Pain due to fracture   ? High risk medication use   ? Heart failure exacerbated by sotalol (H)   ? T12  compression fracture (H)   ? Acute on chronic diastolic congestive heart failure (H)   ? Metabolic encephalopathy   ? Low back pain   ? Compression fracture of L1 vertebra, sequela   ? Chronic respiratory failure with hypoxia (H)   ? Chronic obstructive pulmonary disease, unspecified COPD type (H)   ? Chronic systolic congestive heart failure (H)   ? Hypoxia   ? S/P hip replacement, left   ? Hip pain, left   ? Acute hypoxemic respiratory failure (H)   ? Chest pain   ? COPD exacerbation (H)   ? Herpes zoster without complication   ? Chest wall pain   ? Benign essential hypertension   ? Dyslipidemia   ? Acute respiratory failure with hypoxia (H)   ? Pulsatile tinnitus of both ears   ? Hot flashes due to tamoxifen   ? Pleural nodules   ? Hot flashes, menopausal   ? Malignant neoplasm of central portion of left female breast (H)   ? CKD (chronic kidney disease) stage 3, GFR 30-59 ml/min   ? Dyspnea   ? Insomnia   ? Leg pain, left   ? Femur fracture (H)   ? GERD (gastroesophageal reflux disease)   ? Post menopausal syndrome   ? OP (osteoporosis)   ? Hypertension   ? Pulmonary emphysema, unspecified emphysema type (H)   ? Hyperlipidemia   ? Centrilobular emphysema (H)   ? Anisocoria   ? OAB (overactive bladder)   ? Factor 5 Leiden mutation, heterozygous (H)   ? Family history of blood disease   ? Bladder incontinence   ? Physical deconditioning   ? Cardiomyopathy, idiopathic (H)   ? Depression with anxiety   ? Osteoarthritis of hip   ? Herniated intervertebral disc     Past Medical History:   Diagnosis Date   ? Arrhythmia    ? Breast cancer (H) 2017   ? CHF (congestive heart failure) (H)    ? COPD (chronic obstructive pulmonary disease) (H)    ? Coronary artery disease    ? GERD (gastroesophageal reflux disease)    ? Hx of radiation therapy 2017   ? Hyperlipidemia    ? Hypertension    ? Idiopathic cardiomyopathy (H)        Past Social History     Reviewed, and she  reports that she quit smoking about 13 years ago. She  quit smokeless tobacco use about 16 years ago. She reports that she does not drink alcohol or use drugs.    Family History     Reviewed, and family history includes Breast cancer in her maternal aunt; Osteoporosis in an other family member; Prostate cancer in her brother and maternal uncle.    Medication List   Post Discharge Medication Reconciliation Status: discharge medications reconciled, continue medications without change   Current Outpatient Medications on File Prior to Visit   Medication Sig Dispense Refill   ? acetaminophen (TYLENOL) 325 MG tablet Take 2 tablets (650 mg total) by mouth 3 (three) times a day. (Patient taking differently: Take 650 mg by mouth 4 (four) times a day. )  0   ? ADVAIR DISKUS 500-50 mcg/dose DISKUS USE 1 INHALATION TWICE A DAY 60 each 11   ? albuterol (PROAIR HFA;PROVENTIL HFA;VENTOLIN HFA) 90 mcg/actuation inhaler Inhale 2 puffs every 4 (four) hours as needed for wheezing. 1 Inhaler 0   ? ascorbic acid, vitamin C, (VITAMIN C) 1000 MG tablet Take 1,000 mg by mouth daily. Airborne 1 tab daily     ? aspirin 81 MG EC tablet Take 81 mg by mouth daily.     ? azelastine (ASTELIN) 137 mcg (0.1 %) nasal spray 2 sprays into each nostril daily.     ? benzonatate (TESSALON) 200 MG capsule Take 1 capsule (200 mg total) by mouth 3 (three) times a day as needed for cough. 90 capsule 2   ? celecoxib (CELEBREX) 200 MG capsule Take 200 mg by mouth 2 (two) times a day.     ? chlorhexidine (PERIDEX) 0.12 % solution Apply 15 mL to the mouth or throat at bedtime as needed.      ? cholecalciferol, vitamin D3, 1,000 unit tablet Take 1,000 Units by mouth daily.     ? diclofenac sodium (VOLTAREN) 1 % Gel Apply 4 g to area of pain in low back 4 times daily  0   ? EPINEPHrine (EPIPEN/ADRENACLICK/AUVI-Q) 0.3 mg/0.3 mL injection Inject 0.3 mg into the shoulder, thigh, or buttocks.     ? famotidine (PEPCID) 20 MG tablet Take 20 mg by mouth daily.     ? fluticasone (FLONASE) 50 mcg/actuation nasal spray Apply 1  spray into each nostril as needed.      ? furosemide (LASIX) 40 MG tablet Take 1 tablet (40 mg total) by mouth 2 (two) times a day at 9am and 6pm.  0   ? gabapentin (NEURONTIN) 300 MG capsule Take 2 capsules (600 mg total) by mouth 2 (two) times a day. 360 capsule 2   ? HYDROcodone-acetaminophen 5-325 mg per tablet Take 1 tablet by mouth every 4 (four) hours as needed. 90 tablet 0   ? ipratropium (ATROVENT HFA) 17 mcg/actuation inhaler Inhale 2 puffs every 6 (six) hours. 3 Inhaler 3   ? ipratropium-albuterol (DUO-NEB) 0.5-2.5 mg/3 mL nebulizer 1 neb 4 times a day until seen by primary and then as needed or as directed 60 vial 1   ? lidocaine (XYLOCAINE) 5 % ointment Apply to area of pain in low back 4 times daily 35.44 g 0   ? losartan (COZAAR) 50 MG tablet TAKE 1 TABLET DAILY 90 tablet 3   ? melatonin 3 mg Tab tablet Take 1 tablet (3 mg total) by mouth at bedtime.  0   ? metoprolol succinate (TOPROL-XL) 50 MG 24 hr tablet Take 1.5 tablets (75 mg total) by mouth daily. 135 tablet 3   ? mirabegron (MYRBETRIQ) 50 mg Tb24 ER tablet Take 1 tablet (50 mg total) by mouth daily. 90 tablet 3   ? omalizumab (XOLAIR) 150 mg/mL Syrg injection Inject 300 mg under the skin every 14 (fourteen) days.      ? pantoprazole (PROTONIX) 40 MG tablet Take 1 tablet (40 mg total) by mouth daily. 90 tablet 4   ? polyethylene glycol (MIRALAX) 17 gram packet Take 1 packet (17 g total) by mouth daily as needed.  0   ? QUEtiapine (SEROQUEL) 25 MG tablet Take 0.5 tablets (12.5 mg total) by mouth 3 (three) times a day as needed (Agitation that is not redirectable.).  0   ? rivaroxaban ANTICOAGULANT (XARELTO) 20 mg tablet Take 20 mg by mouth at bedtime.     ? senna-docusate (PERICOLACE) 8.6-50 mg tablet Take 1 tablet by mouth daily as needed for constipation.  0   ? simvastatin (ZOCOR) 40 MG tablet Take 1 tablet (40 mg total) by mouth at bedtime. 90 tablet 3   ? sodium chloride (OCEAN) 0.65 % nasal spray 2 sprays into each nostril as needed.     ?  solifenacin (VESICARE) 5 MG tablet Take 1 tablet (5 mg total) by mouth at bedtime. 90 tablet 3   ? tamoxifen (NOLVADEX) 20 MG tablet TAKE 1 TABLET DAILY 90 tablet 4   ? traZODone (DESYREL) 50 MG tablet TAKE 1 TABLET(50 MG) BY MOUTH AT BEDTIME AS NEEDED 90 tablet 2   ? triamcinolone (KENALOG) 0.1 % cream Apply to rash on arms twice daily for one week 30 g 0   ? triamcinolone (KENALOG) 0.5 % ointment Apply o foot twice a day 30 g 1   ? venlafaxine (EFFEXOR-XR) 150 MG 24 hr capsule TAKE 1 CAPSULE DAILY 90 capsule 4     Current Facility-Administered Medications on File Prior to Visit   Medication Dose Route Frequency Provider Last Rate Last Admin   ? denosumab 60 mg (PROLIA 60 mg/ml)  60 mg Subcutaneous Q6 Months Suzanna Shane MD   60 mg at 02/04/20 1352       Allergies     Allergies   Allergen Reactions   ? Sulfa (Sulfonamide Antibiotics) Rash     Headaches and dizziness.   ? Homeopathic Drugs Unknown and Other (See Comments)     Comment: runny nose, Comment: runny nose   ? Mold Extracts    ? Morphine (Pf)      hallucinate   ? Lisinopril Cough, Unknown and Itching     Comment: cough, Comment: cough   ? Sulfacetamide Sodium Rash       Review of Systems   A comprehensive review of 14 systems was done. Pertinent findings noted here and in history of present illness. All the rest negative.  Constitutional: Negative.  Negative for fever, chills, she has activity change, appetite change and fatigue.   HENT: Negative for congestion and facial swelling.    Eyes: Negative for photophobia, redness and visual disturbance.   Respiratory: Negative for cough and chest tightness.    Cardiovascular: Negative for chest pain, palpitations and leg swelling.   Gastrointestinal: Negative for nausea, diarrhea, constipation, blood in stool and abdominal distention.   Genitourinary: Negative.    Musculoskeletal: Negative.  Pain in her low back; today reporting intractable muscle spasms which are  improving  Has piriformis pain which also  feels better  Skin: Negative.    Neurological: Negative for dizziness, tremors, syncope, weakness, light-headedness and headaches.   Hematological: Does not bruise/bleed easily.   Psychiatric/Behavioral: Negative.  Significant distress due to pain is improved and feels much better      Physical Exam     Recent Vitals 10/24/2020   Height -   Weight -   BSA (m2) -   /88   Pulse 85   Temp 97.5   Temp src 1   SpO2 92   Some recent data might be hidden     Currently on 2 L of oxygen by nasal cannula wt 142lb  R/C /67  Constitutional: Oriented to person, place, and time and appears well-developed.   HEENT:  Normocephalic and atraumatic.  Eyes: Conjunctivae and EOM are normal. Pupils are equal, round, and reactive to light. No discharge.  No scleral icterus. Nose normal. Mouth/Throat: Oropharynx is clear and moist. No oropharyngeal exudate.    NECK: Normal range of motion. Neck supple. No JVD present. No tracheal deviation present. No thyromegaly present.   CARDIOVASCULAR: Normal rate, regular rhythm and intact distal pulses.  Exam reveals no gallop and no friction rub.  Systolic murmur present.  PULMONARY: Effort normal and breath sounds normal. No respiratory distress.No Wheezing or rales.  ABDOMEN: Soft. Bowel sounds are normal. No distension and no mass.  There is no tenderness. There is no rebound and no guarding. No HSM.  MUSCULOSKELETAL: Normal range of motion. No edema and no tenderness. Mild kyphosis,  tenderness  to palpation on her lower thoracic spine  Mobility is limited.  LYMPH NODES: Has no cervical, supraclavicular, axillary and groin adenopathy.   NEUROLOGICAL: Alert and oriented to person, place, and time. No cranial nerve deficit.  Normal muscle tone. Coordination normal.   GENITOURINARY: Deferred exam.  SKIN: Skin is warm and dry. No rash noted. No erythema. No pallor.   EXTREMITIES: No cyanosis, no clubbing, no edema. No Deformity.  PSYCHIATRIC: Normal mood, affect and behavior.        Lab  Results     Recent Results (from the past 240 hour(s))   COVID-19 Virus PCR MRF    Collection Time: 11/17/20  7:00 AM    Specimen: Respiratory   Result Value Ref Range    COVID-19 VIRUS SPECIMEN SOURCE Nasopharyngeal     2019-nCOV Not Detected    Basic Metabolic Panel    Collection Time: 11/18/20 12:10 PM   Result Value Ref Range    Sodium 140 136 - 145 mmol/L    Potassium 4.0 3.5 - 5.0 mmol/L    Chloride 103 98 - 107 mmol/L    CO2 23 22 - 31 mmol/L    Anion Gap, Calculation 14 5 - 18 mmol/L    Glucose 70 70 - 125 mg/dL    Calcium 9.4 8.5 - 10.5 mg/dL    BUN 17 8 - 28 mg/dL    Creatinine 1.19 (H) 0.60 - 1.10 mg/dL    GFR MDRD Af Amer 54 (L) >60 mL/min/1.73m2    GFR MDRD Non Af Amer 44 (L) >60 mL/min/1.73m2                  VASHTI Sanchez

## 2021-06-21 NOTE — LETTER
Letter by Paola Rose MBBS at      Author: Paola Rose MBBS Service: -- Author Type: --    Filed:  Encounter Date: 4/15/2021 Status: (Other)         [facility]  [address] [address]                                  April 15, 2021    Patient: Irene León   MR Number: [id number]   YOB: 1945   Date of Visit: 4/15/2021     Dear Dr. Pérez:    Thank you for referring Irene León to me for evaluation. Below are the relevant portions of my assessment and plan of care.    If you have questions, please do not hesitate to call me. I look forward to following Irene along with you.    Sincerely,        VASHTI Sanchez          CC  No Recipients  Paola Rose MBBS  4/15/2021  2:15 PM  Sign when Signing Visit    [facility] note      Patient: Irene León  MRN: [id number]      [facility] [[id number]]  Reason for Visit     Chief Complaint   Patient presents with   ? Review Of Multiple Medical Conditions   Follow-up HYPOTENSION AND PAIN    Code Status     Full code    Assessment       Severe sepsis with pyelonephritis  E. coli bacteremia  Possible pneumonia currently improved after antibiotic usage  COPD with chronic hypoxic respiratory failure on 2 L of oxygen at baseline  Chronic congestive heart failure with reduced ejection fraction with acute exacerbation  ANATOLIY  Electrolyte abnormalities with low potassium and magnesium  Atrial fibrillation with rapid ventricular response noted in the hospital  History of depression anxiety.  Insomnia.  History of falls.  Hypotension  Acute encephalopathy secondary to sepsis  Generalized weakness  Recurrent hospitalizations- 3 hospital admission noted in last  6 weeks including 1 ICU admission  FTT    Plan     Patient has been discharged to the TCU.  She was seen today for follow-up.  Recently seen for hypertension and abnormal lung exam.  Reweigh today is 131 pounds which is almost a 10 pound drop since  admission.  Lymphedema concerns are minimal.  Recheck labs done in the TCU showed resolution of her leukocytosis  Recheck white count was 9.5  Her BMP has also shown improvement with a creatinine of 1.3 down from 1.5  In addition hyponatremia has resolved.  She has responded very well to incentive spirometry and aggressive pulmonary toileting and doing quite well with albuterol nebulizer is being scheduled for her.  Pain concerns are stable we are trying to minimize narcotics.  Continue with her PT and OT      History     Patient is a very pleasant 76 y.o. female who is admitted to TCU  Patient presented to the hospital with increasing weakness and altered mental status.  She has underlying history of COPD.  sHe is on oxygen.  She continues to be short of breath  However overall improvement noted since she was put on pulmonary toileting  She is all albuterol nebulizers which have been scheduled with significant improvement noted she reports that she feels her breathing has improved significantly  She wants to continue with the same treatment at home when she discharges  Also noted to be profoundly hypertensive due to with staff is quite concerned about pushing her meds and diuretics.  Eventually ended up holding her diuretics because of this concern  Unfortunately she ultimately fell on the floor and could not get up and was brought into the hospital  She was admitted with severe sepsis with pyelonephritis and E. coli bacteremia  She was also suspected to have acute pneumonia and these were treated with antibiotics given IV with improvement.  Reports no fever no congestion however does have intermittent cough but reports that she just cannot bring the phlegm up  She also was noted to have acute exacerbation of her congestive heart failure.  Echocardiogram shows an EF of 45%.  She was given IV Lasix for diuresis and has been discharged on high doses of oral Lasix.  Her Toprol was also increased.  Today her weights  were reviewed initially she is down more than 10 pounds.  Lymphedema concerns are minimal.  She reports compliance with her diuretics  She developed acute diarrhea felt to be secondary to antibiotics they have recommended continuing Imodium.  A FIB was stable and xarelto was continued. She had some episodes of A. fib with rapid ventricular response in the hospital.  Dosage has been reduced prior to discharge  Heart rates have been stable  Initially some hypotensive episodes noted but no improvement noted.  We are trying to minimize nephrotoxins due to renal impairment concern but her last creatinine had shown improvement with resolution of mild hyponatremia        Past Medical History     Active Ambulatory (Non-Hospital) Problems    Diagnosis   ? At high risk for impaired skin integrity   ? ACP (advance care planning)   ? Exacerbation of intermittent asthma, unspecified asthma severity   ? Acute cystitis without hematuria   ? Factor V Leiden mutation (H)   ? Chronic anticoagulation   ? Essential hypertension, benign   ? Acute systolic congestive heart failure (H)   ? Pain due to fracture   ? High risk medication use   ? Heart failure exacerbated by sotalol (H)   ? T12 compression fracture (H)   ? Acute on chronic diastolic congestive heart failure (H)   ? Metabolic encephalopathy   ? Low back pain   ? Compression fracture of L1 vertebra, sequela   ? Chronic respiratory failure with hypoxia (H)   ? Chronic obstructive pulmonary disease, unspecified COPD type (H)   ? Chronic systolic congestive heart failure (H)   ? Hypoxia   ? S/P hip replacement, left   ? Hip pain, left   ? Acute hypoxemic respiratory failure (H)   ? Chest pain   ? COPD exacerbation (H)   ? Herpes zoster without complication   ? Chest wall pain   ? Benign essential hypertension   ? Dyslipidemia   ? Acute respiratory failure with hypoxia (H)   ? Pulsatile tinnitus of both ears   ? Hot flashes due to tamoxifen   ? Pleural nodules   ? Hot flashes,  menopausal   ? Malignant neoplasm of central portion of left female breast (H)   ? CKD (chronic kidney disease) stage 3, GFR 30-59 ml/min   ? Dyspnea   ? Insomnia   ? Leg pain, left   ? Femur fracture (H)   ? GERD (gastroesophageal reflux disease)   ? Post menopausal syndrome   ? OP (osteoporosis)   ? Hypertension   ? Pulmonary emphysema, unspecified emphysema type (H)   ? Hyperlipidemia   ? Centrilobular emphysema (H)   ? Anisocoria   ? OAB (overactive bladder)   ? Factor 5 Leiden mutation, heterozygous (H)   ? Family history of blood disease   ? Bladder incontinence   ? Physical deconditioning   ? Cardiomyopathy, idiopathic (H)   ? Depression with anxiety   ? Osteoarthritis of hip   ? Herniated intervertebral disc     Past Medical History:   Diagnosis Date   ? ANATOLIY (acute kidney injury) (H)    ? Allergic rhinitis    ? Arrhythmia    ? Atrial fibrillation with RVR (H)    ? Bacteremia    ? Breast cancer (H) 2017   ? Cardiomyopathy (H)    ? Centrilobular emphysema (H)    ? CHF (congestive heart failure) (H)    ? CKD (chronic kidney disease)    ? COPD (chronic obstructive pulmonary disease) (H)    ? Coronary artery disease    ? Depression    ? Dysphagia    ? E. coli sepsis (H)    ? Factor 5 Leiden mutation, heterozygous (H)    ? GERD (gastroesophageal reflux disease)    ? Hx of radiation therapy 2017   ? Hyperlipidemia    ? Hypertension    ? Hypokalemia    ? Hypomagnesemia    ? Idiopathic cardiomyopathy (H)    ? OAB (overactive bladder)    ? Osteoporosis    ? Sacral insufficiency fracture    ? Sinusitis    ? Syncope    ? Urge incontinence    ? Vocal cord dysfunction        Past Social History     Reviewed, and she  reports that she quit smoking about 13 years ago. She quit smokeless tobacco use about 16 years ago. She reports that she does not drink alcohol or use drugs.    Family History     Reviewed, and family history includes Breast cancer in her maternal aunt; Osteoporosis in an other family member; Prostate cancer  in her brother and maternal uncle.    Medication List   Post Discharge Medication Reconciliation Status: discharge medications reconciled, continue medications without change   Current Outpatient Medications on File Prior to Visit   Medication Sig Dispense Refill   ? acetaminophen (TYLENOL) 500 MG tablet Take 1,000 mg by mouth 3 (three) times a day.     ? albuterol (PROAIR HFA;PROVENTIL HFA;VENTOLIN HFA) 90 mcg/actuation inhaler Inhale 2 puffs every 4 (four) hours as needed for wheezing. 1 Inhaler 0   ? ascorbic acid, vitamin C, (VITAMIN C) 1000 MG tablet Take 1,000 mg by mouth daily. Airborne 1 tab daily     ? azelastine (ASTELIN) 137 mcg (0.1 %) nasal spray 2 sprays into each nostril daily.     ? azithromycin (ZITHROMAX) 250 MG tablet Take 1 tablet (250 mg total) by mouth at bedtime. 1 tablet 0   ? benzonatate (TESSALON) 200 MG capsule Take 1 capsule (200 mg total) by mouth 3 (three) times a day as needed for cough. 90 capsule 0   ? chlorhexidine (PERIDEX) 0.12 % solution Apply 15 mL to the mouth or throat at bedtime as needed.      ? cholecalciferol, vitamin D3, 1,000 unit tablet Take 1,000 Units by mouth daily.     ? diclofenac sodium (VOLTAREN) 1 % Gel Apply 1 g topically 3 (three) times a day.      ? EPINEPHrine (EPIPEN/ADRENACLICK/AUVI-Q) 0.3 mg/0.3 mL injection Inject 0.3 mg into the shoulder, thigh, or buttocks.     ? fluticasone (FLONASE) 50 mcg/actuation nasal spray Apply 1 spray into each nostril 2 (two) times a day as needed for allergies.      ? fluticasone-umeclidinium-vilanterol (TRELEGY ELLIPTA) 100-62.5-25 mcg DsDv inhaler Inhale 1 Inhalation daily. 1 each 3   ? furosemide (LASIX) 40 MG tablet Take 1 tablet (40 mg total) by mouth 2 (two) times a day. (Patient taking differently: Take 80 mg by mouth every morning. and 40mg at 2pm) 180 tablet 1   ? gabapentin (NEURONTIN) 300 MG capsule Take 2 capsules (600 mg total) by mouth 2 (two) times a day. 360 capsule 2   ? hydrOXYzine pamoate (VISTARIL) 25 MG  capsule Take 25 mg by mouth 3 (three) times a day.      ? ipratropium (ATROVENT HFA) 17 mcg/actuation inhaler Inhale 2 puffs every 6 (six) hours. 3 Inhaler 3   ? loperamide (IMODIUM) 2 mg capsule Take 4 mg by mouth as needed for diarrhea (Every 3 hours as needed for Diarrhea).     ? melatonin 3 mg Tab tablet Take 1 tablet (3 mg total) by mouth at bedtime.  0   ? metoprolol succinate (TOPROL-XL) 50 MG 24 hr tablet Take 100 mg by mouth at bedtime.     ? MYRBETRIQ 50 mg Tb24 ER tablet TAKE 1 TABLET DAILY 90 tablet 3   ? pantoprazole (PROTONIX) 40 MG tablet Take 1 tablet (40 mg total) by mouth daily. 90 tablet 4   ? polyethylene glycol (MIRALAX) 17 gram packet Take 1 packet (17 g total) by mouth daily as needed.  0   ? rivaroxaban ANTICOAGULANT (XARELTO) 15 mg tablet Take 15 mg by mouth daily with supper.     ? simvastatin (ZOCOR) 40 MG tablet Take 1 tablet (40 mg total) by mouth at bedtime. 90 tablet 3   ? sodium chloride (OCEAN) 0.65 % nasal spray 2 sprays into each nostril every 2 (two) hours as needed for congestion.      ? solifenacin (VESICARE) 5 MG tablet Take 1 tablet (5 mg total) by mouth at bedtime. 90 tablet 3   ? tamoxifen (NOLVADEX) 20 MG tablet TAKE 1 TABLET DAILY 90 tablet 3   ? traMADoL (ULTRAM) 50 mg tablet Take 1 tablet (50 mg total) by mouth every 6 (six) hours as needed for pain or severe pain (7-10). 30 tablet 0   ? venlafaxine (EFFEXOR-XR) 150 MG 24 hr capsule TAKE 1 CAPSULE DAILY 90 capsule 3     No current facility-administered medications on file prior to visit.        Allergies     Allergies   Allergen Reactions   ? Sulfa (Sulfonamide Antibiotics) Rash     Headaches and dizziness.   ? Homeopathic Drugs Unknown and Other (See Comments)     Comment: runny nose, Comment: runny nose   ? Mold Extracts    ? Morphine (Pf)      hallucinate   ? Lisinopril Cough, Unknown and Itching     Comment: cough, Comment: cough   ? Sulfacetamide Sodium Rash       Review of Systems   A comprehensive review of 14  "systems was done. Pertinent findings noted here and in history of present illness. All the rest negative.  Constitutional: Negative.  Negative for fever, chills, she has  activity change, appetite change and fatigue.   HENT: Negative for congestion and facial swelling.    Eyes: Negative for photophobia, redness and visual disturbance.   Respiratory: Negative for cough and chest tightness.    Cardiovascular: Negative for chest pain, palpitations and has  leg swelling.   Gastrointestinal: Negative for nausea, diarrhea, constipation, blood in stool and abdominal distention.   Genitourinary: Negative.    Musculoskeletal: Negative.  Reports limited endurance  Skin: Negative.    Neurological: Negative for dizziness, tremors, syncope, weakness, light-headedness and headaches.   Hematological: Does not bruise/bleed easily.   Psychiatric/Behavioral: Negative.        Physical Exam     Recent Vitals 4/15/2021   Height 5' 0\"   Weight 132 lbs 10 oz   BSA (m2) 1.6 m2   /78   Pulse 80   Temp 98.3   SpO2 90   Some recent data might be hidden   on 2l  R/C BP 90/54    Constitutional: Oriented to person, place, and time and appears well-developed.   HEENT:  Normocephalic and atraumatic.  Eyes: Conjunctivae and EOM are normal. Pupils are equal, round, and reactive to light. No discharge.  No scleral icterus. Nose normal. Mouth/Throat: Oropharynx is clear and moist. No oropharyngeal exudate.    NECK: Normal range of motion. Neck supple. No JVD present. No tracheal deviation present. No thyromegaly present.   CARDIOVASCULAR: Normal rate, regular rhythm and intact distal pulses.  Exam reveals no gallop and no friction rub.  Systolic murmur present.  PULMONARY: Effort normal and breath sounds normal. No respiratory distress.No Wheezing or rales.  faint rales heard TAWNY WORSE ON LEFT LUNG BASE  ABDOMEN: Soft. Bowel sounds are normal. No distension and no mass.  There is no tenderness. There is no rebound and no guarding. No " HSM.  MUSCULOSKELETAL: Normal range of motion. 2+ LE edema and no tenderness. Mild kyphosis, HAS RIB tenderness.  LYMPH NODES: Has no cervical, supraclavicular, axillary and groin adenopathy.   NEUROLOGICAL: Alert and oriented to person, place, and time. No cranial nerve deficit.  Normal muscle tone. Coordination normal.   GENITOURINARY: Deferred exam.  SKIN: Skin is warm and dry. No rash noted. No erythema. No pallor.   EXTREMITIES: No cyanosis, no clubbing, 2+le edema. No Deformity.  PSYCHIATRIC: Normal mood, affect and behavior.      Lab Results     Results for orders placed or performed in visit on 04/05/21   Basic Metabolic Panel   Result Value Ref Range    Sodium 141 136 - 145 mmol/L    Potassium 3.9 3.5 - 5.0 mmol/L    Chloride 104 98 - 107 mmol/L    CO2 27 22 - 31 mmol/L    Anion Gap, Calculation 10 5 - 18 mmol/L    Glucose 77 70 - 125 mg/dL    Calcium 8.5 8.5 - 10.5 mg/dL    BUN 17 8 - 28 mg/dL    Creatinine 1.32 (H) 0.60 - 1.10 mg/dL    GFR MDRD Af Amer 47 (L) >60 mL/min/1.73m2    GFR MDRD Non Af Amer 39 (L) >60 mL/min/1.73m2                Electronically signed by  VASHTI Sanchez

## 2021-06-21 NOTE — LETTER
Letter by Paola Rose MBBS at      Author: Paola Rose MBBS Service: -- Author Type: --    Filed:  Encounter Date: 11/16/2020 Status: (Other)         Patient: Irene León   MR Number: [id number]   YOB: 1945   Date of Visit: 11/16/2020       [facility] Admission note      Patient: Irene León  MRN: [id number]  Date of Service: 11/16/2020      [facility] [[id number]]  Reason for Visit     Chief Complaint   Patient presents with   ? Review Of Multiple Medical Conditions   F/U PAIN /MUSCLE SPASMS/ chf    Code Status     FULL CODE    Assessment     -Acute CHF exacerbation with persistent weight gain in spite of being on a higher dose of Lasix  -Ongoing concerns about cough and congestion with hypoxic respiratory failure  -T12 compression fracture  -ANATOLIY with elevated creatinine 1.4 in the TCU  -Osteoporosis restarted on Prolia  -Pain management no longer on narcotics; no discharge on narcotics including Percocet  -Acute metabolic encephalopathy/delirium felt to be secondary to opioid medication  -CHF with reduced ejection fraction acute exacerbation  -- ANATOLIY /cardiorenal syndrome  -History of COPD and asthma  -Acute on chronic hypoxic respiratory failure ON 3L BY NC  -Peripheral arterial disease  -History of factor V Leiden and occipital stroke on Xarelto  -History of breast cancer  -Concern for mild dementia with baseline cognitive impairment  Slums 13/30  -Generalized weakness      Plan     Patient has been admitted to the TCU for strengthening and rehab  Patient continues to have issues with pain management with intractable low back and hip pain.  Orthopedic consultation was reviewed.  She has been recommended to go for kyphoplasty and piriformis injection.  In the meantime she is awaiting those appointments.  She is compliant with her bracing.  Weights are stable and improved to 142 pounds with minimal cough and congestion noted today on exam.  She is at baseline  oxygen needs of 2 L.  Her CHF exacerbation has resolved quite nicely and she is being maintained on Bumex 2 mg.  Her issue remains to be she wants to go home her  has survivors dementia and she is his caregiver  Unfortunately she understands that she is still having significant pain and would like to be pain-free before going home she is hoping she can get the injections and then go home  Has BMP done in the TCU with normal electrolytes and stable creatinine at 1.4    History     Patient is a very pleasant 75 y.o. female who is admitted to TCU.  Admitted with a T12 compression fracture  She has been discharged weightbearing as tolerated with a TLSO brace to be worn all day  She did have a follow-up with orthopedics.  At per the recommendation she has been given a referral for kyphoplasty as well as for having a piriformis injection.  She is still awaiting those appointments and quite upset that she has not received them as she feels that is a major barrier to her going home  Patient is continuing to rate her pain is quite high.  She is compliant with her TLSO bracing.  Unfortunately still rating her pain is quite high and not ready for any taper the family has been requesting 1.  She feels that her pain is still quite high  She also had recent exacerbation of CHF for which she was given high doses of Bumex.  BMPs have been stable she is reporting her cough and congestion have improved.  Now down to 142 pounds which is almost close to 8 pound weight loss  She is back to her baseline oxygen needs which is 2 to 3 L.  Cognitively she has some impairment but overall has been stable she appears to be mildly forgetful.  Physically remains quite debilitated and wheelchair-bound      Past Medical History     Active Ambulatory (Non-Hospital) Problems    Diagnosis   ? Pain due to fracture   ? High risk medication use   ? Heart failure exacerbated by sotalol (H)   ? T12 compression fracture (H)   ? Acute on chronic  diastolic congestive heart failure (H)   ? Metabolic encephalopathy   ? Low back pain   ? Compression fracture of L1 vertebra, sequela   ? Chronic respiratory failure with hypoxia (H)   ? Chronic obstructive pulmonary disease, unspecified COPD type (H)   ? Chronic systolic congestive heart failure (H)   ? Hypoxia   ? S/P hip replacement, left   ? Hip pain, left   ? Acute hypoxemic respiratory failure (H)   ? Chest pain   ? COPD exacerbation (H)   ? Herpes zoster without complication   ? Chest wall pain   ? Benign essential hypertension   ? Dyslipidemia   ? Acute respiratory failure with hypoxia (H)   ? Pulsatile tinnitus of both ears   ? Hot flashes due to tamoxifen   ? Pleural nodules   ? Hot flashes, menopausal   ? Malignant neoplasm of central portion of left female breast (H)   ? CKD (chronic kidney disease) stage 3, GFR 30-59 ml/min   ? Dyspnea   ? Insomnia   ? Leg pain, left   ? Femur fracture (H)   ? GERD (gastroesophageal reflux disease)   ? Post menopausal syndrome   ? OP (osteoporosis)   ? Hypertension   ? Pulmonary emphysema, unspecified emphysema type (H)   ? Hyperlipidemia   ? Centrilobular emphysema (H)   ? Anisocoria   ? OAB (overactive bladder)   ? Factor 5 Leiden mutation, heterozygous (H)   ? Family history of blood disease   ? Bladder incontinence   ? Physical deconditioning   ? Cardiomyopathy, idiopathic (H)   ? Depression with anxiety   ? Osteoarthritis of hip   ? Herniated intervertebral disc     Past Medical History:   Diagnosis Date   ? Arrhythmia    ? Breast cancer (H) 2017   ? CHF (congestive heart failure) (H)    ? COPD (chronic obstructive pulmonary disease) (H)    ? Coronary artery disease    ? GERD (gastroesophageal reflux disease)    ? Hx of radiation therapy 2017   ? Hyperlipidemia    ? Hypertension    ? Idiopathic cardiomyopathy (H)        Past Social History     Reviewed, and she  reports that she quit smoking about 13 years ago. She quit smokeless tobacco use about 16 years ago.  She reports that she does not drink alcohol or use drugs.    Family History     Reviewed, and family history includes Breast cancer in her maternal aunt; Osteoporosis in an other family member; Prostate cancer in her brother and maternal uncle.    Medication List   Post Discharge Medication Reconciliation Status: discharge medications reconciled, continue medications without change   Current Outpatient Medications on File Prior to Visit   Medication Sig Dispense Refill   ? acetaminophen (TYLENOL) 325 MG tablet Take 2 tablets (650 mg total) by mouth 3 (three) times a day. (Patient taking differently: Take 650 mg by mouth 4 (four) times a day. )  0   ? ADVAIR DISKUS 500-50 mcg/dose DISKUS USE 1 INHALATION TWICE A DAY 60 each 11   ? albuterol (PROAIR HFA;PROVENTIL HFA;VENTOLIN HFA) 90 mcg/actuation inhaler Inhale 2 puffs every 4 (four) hours as needed for wheezing. 1 Inhaler 0   ? ascorbic acid, vitamin C, (VITAMIN C) 1000 MG tablet Take 1,000 mg by mouth daily. Airborne 1 tab daily     ? aspirin 81 MG EC tablet Take 81 mg by mouth daily.     ? azelastine (ASTELIN) 137 mcg (0.1 %) nasal spray 2 sprays into each nostril daily.     ? benzonatate (TESSALON) 200 MG capsule Take 1 capsule (200 mg total) by mouth 3 (three) times a day as needed for cough. 90 capsule 2   ? celecoxib (CELEBREX) 200 MG capsule Take 200 mg by mouth 2 (two) times a day.     ? chlorhexidine (PERIDEX) 0.12 % solution Apply 15 mL to the mouth or throat at bedtime as needed.      ? cholecalciferol, vitamin D3, 1,000 unit tablet Take 1,000 Units by mouth daily.     ? diclofenac sodium (VOLTAREN) 1 % Gel Apply 4 g to area of pain in low back 4 times daily  0   ? EPINEPHrine (EPIPEN/ADRENACLICK/AUVI-Q) 0.3 mg/0.3 mL injection Inject 0.3 mg into the shoulder, thigh, or buttocks.     ? famotidine (PEPCID) 20 MG tablet Take 20 mg by mouth daily.     ? fluticasone (FLONASE) 50 mcg/actuation nasal spray Apply 1 spray into each nostril as needed.      ?  furosemide (LASIX) 40 MG tablet Take 1 tablet (40 mg total) by mouth 2 (two) times a day at 9am and 6pm.  0   ? gabapentin (NEURONTIN) 300 MG capsule Take 2 capsules (600 mg total) by mouth 2 (two) times a day. 360 capsule 2   ? HYDROcodone-acetaminophen 5-325 mg per tablet Take 1 tablet by mouth every 4 (four) hours as needed. 90 tablet 0   ? ipratropium (ATROVENT HFA) 17 mcg/actuation inhaler Inhale 2 puffs every 6 (six) hours. 3 Inhaler 3   ? ipratropium-albuterol (DUO-NEB) 0.5-2.5 mg/3 mL nebulizer 1 neb 4 times a day until seen by primary and then as needed or as directed 60 vial 1   ? lidocaine (XYLOCAINE) 5 % ointment Apply to area of pain in low back 4 times daily 35.44 g 0   ? losartan (COZAAR) 50 MG tablet TAKE 1 TABLET DAILY 90 tablet 3   ? melatonin 3 mg Tab tablet Take 1 tablet (3 mg total) by mouth at bedtime.  0   ? metoprolol succinate (TOPROL-XL) 50 MG 24 hr tablet Take 1.5 tablets (75 mg total) by mouth daily. 135 tablet 3   ? mirabegron (MYRBETRIQ) 50 mg Tb24 ER tablet Take 1 tablet (50 mg total) by mouth daily. 90 tablet 3   ? omalizumab (XOLAIR) 150 mg/mL Syrg injection Inject 300 mg under the skin every 14 (fourteen) days.      ? pantoprazole (PROTONIX) 40 MG tablet Take 1 tablet (40 mg total) by mouth daily. 90 tablet 4   ? polyethylene glycol (MIRALAX) 17 gram packet Take 1 packet (17 g total) by mouth daily as needed.  0   ? QUEtiapine (SEROQUEL) 25 MG tablet Take 0.5 tablets (12.5 mg total) by mouth 3 (three) times a day as needed (Agitation that is not redirectable.).  0   ? rivaroxaban ANTICOAGULANT (XARELTO) 20 mg tablet Take 20 mg by mouth at bedtime.     ? senna-docusate (PERICOLACE) 8.6-50 mg tablet Take 1 tablet by mouth daily as needed for constipation.  0   ? simvastatin (ZOCOR) 40 MG tablet Take 1 tablet (40 mg total) by mouth at bedtime. 90 tablet 3   ? sodium chloride (OCEAN) 0.65 % nasal spray 2 sprays into each nostril as needed.     ? solifenacin (VESICARE) 5 MG tablet Take 1  tablet (5 mg total) by mouth at bedtime. 90 tablet 3   ? tamoxifen (NOLVADEX) 20 MG tablet TAKE 1 TABLET DAILY 90 tablet 4   ? traZODone (DESYREL) 50 MG tablet TAKE 1 TABLET(50 MG) BY MOUTH AT BEDTIME AS NEEDED 90 tablet 2   ? triamcinolone (KENALOG) 0.1 % cream Apply to rash on arms twice daily for one week 30 g 0   ? triamcinolone (KENALOG) 0.5 % ointment Apply o foot twice a day 30 g 1   ? venlafaxine (EFFEXOR-XR) 150 MG 24 hr capsule TAKE 1 CAPSULE DAILY 90 capsule 4     Current Facility-Administered Medications on File Prior to Visit   Medication Dose Route Frequency Provider Last Rate Last Dose   ? denosumab 60 mg (PROLIA 60 mg/ml)  60 mg Subcutaneous Q6 Months Suzanna Shane MD   60 mg at 02/04/20 1352       Allergies     Allergies   Allergen Reactions   ? Sulfa (Sulfonamide Antibiotics) Rash     Headaches and dizziness.   ? Homeopathic Drugs Unknown and Other (See Comments)     Comment: runny nose, Comment: runny nose   ? Mold Extracts    ? Morphine (Pf)      hallucinate   ? Lisinopril Cough, Unknown and Itching     Comment: cough, Comment: cough   ? Sulfacetamide Sodium Rash       Review of Systems   A comprehensive review of 14 systems was done. Pertinent findings noted here and in history of present illness. All the rest negative.  Constitutional: Negative.  Negative for fever, chills, she has activity change, appetite change and fatigue.   HENT: Negative for congestion and facial swelling.    Eyes: Negative for photophobia, redness and visual disturbance.   Respiratory: Negative for cough and chest tightness.    Cardiovascular: Negative for chest pain, palpitations and leg swelling.   Gastrointestinal: Negative for nausea, diarrhea, constipation, blood in stool and abdominal distention.   Genitourinary: Negative.    Musculoskeletal: Negative.  Pain in her low back; today reporting intractable muscle spasms which are not improving  Has piriformis pain  Skin: Negative.    Neurological: Negative for  dizziness, tremors, syncope, weakness, light-headedness and headaches.   Hematological: Does not bruise/bleed easily.   Psychiatric/Behavioral: Negative.  Significant distress due to pain      Physical Exam     Recent Vitals 10/24/2020   Height -   Weight -   BSA (m2) -   /88   Pulse 85   Temp 97.5   Temp src 1   SpO2 92   Some recent data might be hidden     Currently on 2 L of oxygen by nasal cannula wt 142lb  R/C /67  Constitutional: Oriented to person, place, and time and appears well-developed.   HEENT:  Normocephalic and atraumatic.  Eyes: Conjunctivae and EOM are normal. Pupils are equal, round, and reactive to light. No discharge.  No scleral icterus. Nose normal. Mouth/Throat: Oropharynx is clear and moist. No oropharyngeal exudate.    NECK: Normal range of motion. Neck supple. No JVD present. No tracheal deviation present. No thyromegaly present.   CARDIOVASCULAR: Normal rate, regular rhythm and intact distal pulses.  Exam reveals no gallop and no friction rub.  Systolic murmur present.  PULMONARY: Effort normal and breath sounds normal. No respiratory distress.No Wheezing or rales.  ABDOMEN: Soft. Bowel sounds are normal. No distension and no mass.  There is no tenderness. There is no rebound and no guarding. No HSM.  MUSCULOSKELETAL: Normal range of motion. No edema and no tenderness. Mild kyphosis,  tenderness  to palpation on her lower thoracic spine  Mobility is limited.  LYMPH NODES: Has no cervical, supraclavicular, axillary and groin adenopathy.   NEUROLOGICAL: Alert and oriented to person, place, and time. No cranial nerve deficit.  Normal muscle tone. Coordination normal.   GENITOURINARY: Deferred exam.  SKIN: Skin is warm and dry. No rash noted. No erythema. No pallor.   EXTREMITIES: No cyanosis, no clubbing, no edema. No Deformity.  PSYCHIATRIC: Normal mood, affect and behavior.  High anxiety due to pain      Lab Results     Recent Results (from the past 240 hour(s))   COVID-19  Virus PCR MRF    Collection Time: 11/09/20  8:00 AM    Specimen: Respiratory   Result Value Ref Range    COVID-19 VIRUS SPECIMEN SOURCE Nasopharyngeal     2019-nCOV Not Detected                   VASHTI Sanchez

## 2021-06-21 NOTE — LETTER
Letter by Paola Rose MBBS at      Author: Paola Rose MBBS Service: -- Author Type: --    Filed:  Encounter Date: 10/26/2020 Status: (Other)         Patient: Irene León   MR Number: [id number]   YOB: 1945   Date of Visit: 10/26/2020       [facility] Admission note      Patient: Irene León  MRN: [id number]  Date of Service: 10/26/2020      [facility] [[id number]]  Reason for Visit     Chief Complaint   Patient presents with   ? H & P       Code Status     FULL CODE    Assessment     -T12 compression fracture  -Acute on chronic low back pain/patient requesting optimization  -ANATOLIY with elevated creatinine 1.4 in the TCU  -Osteoporosis restarted on Prolia  -Pain management no longer on narcotics; no discharge on narcotics including Percocet  -Acute metabolic encephalopathy/delirium felt to be secondary to opioid medication  -CHF with reduced ejection fraction acute exacerbation  -- ANATOLIY /cardiorenal syndrome  -History of COPD and asthma  -Acute on chronic hypoxic respiratory failure ON 3L BY NC  -Peripheral arterial disease  -History of factor V Leiden and occipital stroke on Xarelto  -History of breast cancer  -Concern for mild dementia with baseline cognitive impairment  Slums 13/30  -Generalized weakness      Plan     Patient has been admitted to the TCU for strengthening and rehab  She was admitted to the hospital with a T12 compression fracture and back pain.  Seen by multiple specialist including orthopedics cardiology as well as pain management  Orthopedics saw her and will discharge her with a TLSO brace in place.  She is compliant with her bracing.  Pain management remains a challenge.  She could not tolerate narcotics and became acutely delirious.  Currently on Percocet.  Does not want to take narcotics as she is afraid she will become more confused again.  Pain management reviewed and we will schedule her on Tylenol 650 mg 4 times daily for her  She is  also on scheduled Robaxin and gabapentin  Continue with her topical Lidoderm gel.  If no improvement with Robaxin or confusion noted switch to Vistaril.  Unfortunately tramadol was not effective for pain management  Add Celebrex 200 mg twice daily for the next 10 days  Close follow-up with orthopedics if no improvement for consideration for vertebroplasty  At her request therapy we will try some alternative modalities including a heating pad as she is feels that may be the most effective modality for her.  Continue her calcitonin nasal spray.  Minimize narcotics because of delirium in the hospital  She does not want to take any oxycodone or Dilaudid.  Monitor cognitive status in the TCU  R/C SLUMS 23/30  Minimize any psychotropic drugs which can cause increasing confusion in this patient.  Discontinue her Seroquel  Staff to keep her mood and behavior log  Continue with her trazodone for insomnia  She has chronic hypoxic respiratory failure and is on 3 L of oxygen at bedtime.  She also uses sparingly and during daytime and currently back on her 3 L.  Medication review done for her asthma/COPD.  Unfortunately her OMALIZUMAB that she gets for her asthma may not be covered under pharmacy regulations.  Patient advised to either bring her own supply or we will be holding the medication based on facility request  She also had congestive heart failure exacerbation and is now discharged on Lasix.  Recheck labs reviewed in the TCU  BMP shows stable electrolytes however she does have impaired kidney function with a creatinine of 1.4  Her calcium magnesium and phosphorus are all within limits of normal  Suspect that her elevated creatinine could be a reflection of her high doses of diuretics.  Recheck BMP closely and if still elevated consider decreasing the dose of her diuretics  Check weights closely no lymphedema noted on exam.  Recheck CBC stable  She will follow-up with cardiology as an outpatient in 2 weeks  She is  profoundly debilitated and can walk less than 50 feet currently.  Continue with the PT OT and rehab    History     Patient is a very pleasant 75 y.o. female who is admitted to TCU.  Patient presented to the hospital with acute low back pain.  She was admitted in the hospital.  Imaging revealed acute/subacute inferior endplate compression fracture of T12 with 50% loss of height.  Orthopedics was consulted.  She has been discharged weightbearing as tolerated with a TLSO brace to be worn all day  Outpatient follow-up for consideration of kyphoplasty recommended in 2 to 3 weeks.  She has underlying history of osteoporosis and her Prolia will be restarted  Pain management reviewed with the patient she is on multiple medications including Tylenol with gabapentin.  Also given Robaxin and Lidoderm patch.  Calcitonin added for bone healing.  It has been recommended that oxycodone and hydromorphone be avoided due to delirium.  While in the hospital she developed hospital associated delirium felt to be related to opioid medications.  Those were discontinued with improvement in cognitive status  She also has congestive heart failure.  She had an acute exacerbation felt to be secondary to medication noncompliance.  Echocardiogram revealed an acute ejection fraction of 30%.  She has been discharged on Lasix 40 mg daily.  2 times a day  She will require weight monitoring and management      Past Medical History     Active Ambulatory (Non-Hospital) Problems    Diagnosis   ? Pain due to fracture   ? High risk medication use   ? Heart failure exacerbated by sotalol (H)   ? T12 compression fracture (H)   ? Acute on chronic diastolic congestive heart failure (H)   ? Metabolic encephalopathy   ? Low back pain   ? Compression fracture of L1 vertebra, sequela   ? Chronic respiratory failure with hypoxia (H)   ? Chronic obstructive pulmonary disease, unspecified COPD type (H)   ? Chronic systolic congestive heart failure (H)   ? Hypoxia    ? S/P hip replacement, left   ? Hip pain, left   ? Acute hypoxemic respiratory failure (H)   ? Chest pain   ? COPD exacerbation (H)   ? Herpes zoster without complication   ? Chest wall pain   ? Benign essential hypertension   ? Dyslipidemia   ? Acute respiratory failure with hypoxia (H)   ? Pulsatile tinnitus of both ears   ? Hot flashes due to tamoxifen   ? Pleural nodules   ? Hot flashes, menopausal   ? Malignant neoplasm of central portion of left female breast (H)   ? CKD (chronic kidney disease) stage 3, GFR 30-59 ml/min   ? Dyspnea   ? Insomnia   ? Leg pain, left   ? Femur fracture (H)   ? GERD (gastroesophageal reflux disease)   ? Post menopausal syndrome   ? OP (osteoporosis)   ? Hypertension   ? Pulmonary emphysema, unspecified emphysema type (H)   ? Hyperlipidemia   ? Centrilobular emphysema (H)   ? Anisocoria   ? OAB (overactive bladder)   ? Factor 5 Leiden mutation, heterozygous (H)   ? Family history of blood disease   ? Bladder incontinence   ? Physical deconditioning   ? Cardiomyopathy, idiopathic (H)   ? Depression with anxiety   ? Osteoarthritis of hip   ? Herniated intervertebral disc     Past Medical History:   Diagnosis Date   ? Arrhythmia    ? Breast cancer (H) 2017   ? CHF (congestive heart failure) (H)    ? COPD (chronic obstructive pulmonary disease) (H)    ? Coronary artery disease    ? GERD (gastroesophageal reflux disease)    ? Hx of radiation therapy 2017   ? Hyperlipidemia    ? Hypertension    ? Idiopathic cardiomyopathy (H)        Past Social History     Reviewed, and she  reports that she quit smoking about 13 years ago. She quit smokeless tobacco use about 16 years ago. She reports that she does not drink alcohol or use drugs.    Family History     Reviewed, and family history includes Breast cancer in her maternal aunt; Osteoporosis in an other family member; Prostate cancer in her brother and maternal uncle.    Medication List   Post Discharge Medication Reconciliation Status:  discharge medications reconciled, continue medications without change   Current Outpatient Medications on File Prior to Visit   Medication Sig Dispense Refill   ? celecoxib (CELEBREX) 200 MG capsule Take 200 mg by mouth 2 (two) times a day.     ? acetaminophen (TYLENOL) 325 MG tablet Take 2 tablets (650 mg total) by mouth 3 (three) times a day. (Patient taking differently: Take 650 mg by mouth 4 (four) times a day. )  0   ? ADVAIR DISKUS 500-50 mcg/dose DISKUS USE 1 INHALATION TWICE A DAY 60 each 11   ? albuterol (PROAIR HFA;PROVENTIL HFA;VENTOLIN HFA) 90 mcg/actuation inhaler Inhale 2 puffs every 4 (four) hours as needed for wheezing. 1 Inhaler 0   ? ascorbic acid, vitamin C, (VITAMIN C) 1000 MG tablet Take 1,000 mg by mouth daily. Airborne 1 tab daily     ? aspirin 81 MG EC tablet Take 81 mg by mouth daily.     ? azelastine (ASTELIN) 137 mcg (0.1 %) nasal spray 2 sprays into each nostril daily.     ? benzonatate (TESSALON) 200 MG capsule Take 1 capsule (200 mg total) by mouth 3 (three) times a day as needed for cough. 90 capsule 2   ? calcitonin, salmon, (MIACALCIN) 200 unit/actuation nasal spray Apply 1 spray into alternating nostrils daily for 14 days.  0   ? chlorhexidine (PERIDEX) 0.12 % solution Apply 15 mL to the mouth or throat at bedtime as needed.      ? cholecalciferol, vitamin D3, 1,000 unit tablet Take 1,000 Units by mouth daily.     ? diclofenac sodium (VOLTAREN) 1 % Gel Apply 4 g to area of pain in low back 4 times daily  0   ? EPINEPHrine (EPIPEN/ADRENACLICK/AUVI-Q) 0.3 mg/0.3 mL injection Inject 0.3 mg into the shoulder, thigh, or buttocks.     ? famotidine (PEPCID) 20 MG tablet Take 20 mg by mouth daily.     ? fluticasone (FLONASE) 50 mcg/actuation nasal spray Apply 1 spray into each nostril as needed.      ? furosemide (LASIX) 40 MG tablet Take 1 tablet (40 mg total) by mouth 2 (two) times a day at 9am and 6pm.  0   ? gabapentin (NEURONTIN) 300 MG capsule Take 2 capsules (600 mg total) by mouth 2  (two) times a day. 360 capsule 2   ? HYDROcodone-acetaminophen 5-325 mg per tablet Take 1 tablet by mouth every 4 (four) hours as needed. 20 tablet 0   ? ipratropium (ATROVENT HFA) 17 mcg/actuation inhaler Inhale 2 puffs every 6 (six) hours. 3 Inhaler 3   ? ipratropium-albuterol (DUO-NEB) 0.5-2.5 mg/3 mL nebulizer 1 neb 4 times a day until seen by primary and then as needed or as directed 60 vial 1   ? lidocaine (XYLOCAINE) 5 % ointment Apply to area of pain in low back 4 times daily 35.44 g 0   ? losartan (COZAAR) 50 MG tablet TAKE 1 TABLET DAILY 90 tablet 3   ? melatonin 3 mg Tab tablet Take 1 tablet (3 mg total) by mouth at bedtime.  0   ? methocarbamoL (ROBAXIN) 750 MG tablet Take 1 tablet (750 mg total) by mouth every 6 (six) hours for 14 days.  0   ? metoprolol succinate (TOPROL-XL) 50 MG 24 hr tablet Take 1.5 tablets (75 mg total) by mouth daily. 135 tablet 3   ? mirabegron (MYRBETRIQ) 50 mg Tb24 ER tablet Take 1 tablet (50 mg total) by mouth daily. 90 tablet 3   ? omalizumab (XOLAIR) 150 mg/mL Syrg injection Inject 300 mg under the skin every 14 (fourteen) days.      ? pantoprazole (PROTONIX) 40 MG tablet Take 1 tablet (40 mg total) by mouth daily. 90 tablet 4   ? polyethylene glycol (MIRALAX) 17 gram packet Take 1 packet (17 g total) by mouth daily as needed.  0   ? QUEtiapine (SEROQUEL) 25 MG tablet Take 0.5 tablets (12.5 mg total) by mouth 3 (three) times a day as needed (Agitation that is not redirectable.).  0   ? rivaroxaban ANTICOAGULANT (XARELTO) 20 mg tablet Take 20 mg by mouth at bedtime.     ? senna-docusate (PERICOLACE) 8.6-50 mg tablet Take 1 tablet by mouth daily as needed for constipation.  0   ? simvastatin (ZOCOR) 40 MG tablet Take 1 tablet (40 mg total) by mouth at bedtime. 90 tablet 3   ? sodium chloride (OCEAN) 0.65 % nasal spray 2 sprays into each nostril as needed.     ? solifenacin (VESICARE) 5 MG tablet Take 1 tablet (5 mg total) by mouth at bedtime. 90 tablet 3   ? tamoxifen  (NOLVADEX) 20 MG tablet TAKE 1 TABLET DAILY 90 tablet 4   ? traZODone (DESYREL) 50 MG tablet TAKE 1 TABLET(50 MG) BY MOUTH AT BEDTIME AS NEEDED 90 tablet 2   ? triamcinolone (KENALOG) 0.1 % cream Apply to rash on arms twice daily for one week 30 g 0   ? triamcinolone (KENALOG) 0.5 % ointment Apply o foot twice a day 30 g 1   ? venlafaxine (EFFEXOR-XR) 150 MG 24 hr capsule TAKE 1 CAPSULE DAILY 90 capsule 4     Current Facility-Administered Medications on File Prior to Visit   Medication Dose Route Frequency Provider Last Rate Last Dose   ? denosumab 60 mg (PROLIA 60 mg/ml)  60 mg Subcutaneous Q6 Months Suzanna Shane MD   60 mg at 02/04/20 9033       Allergies     Allergies   Allergen Reactions   ? Sulfa (Sulfonamide Antibiotics) Rash     Headaches and dizziness.   ? Homeopathic Drugs Unknown and Other (See Comments)     Comment: runny nose, Comment: runny nose   ? Mold Extracts    ? Morphine (Pf)      hallucinate   ? Lisinopril Cough, Unknown and Itching     Comment: cough, Comment: cough   ? Sulfacetamide Sodium Rash       Review of Systems   A comprehensive review of 14 systems was done. Pertinent findings noted here and in history of present illness. All the rest negative.  Constitutional: Negative.  Negative for fever, chills, she has activity change, appetite change and fatigue.   HENT: Negative for congestion and facial swelling.    Eyes: Negative for photophobia, redness and visual disturbance.   Respiratory: Negative for cough and chest tightness.    Cardiovascular: Negative for chest pain, palpitations and leg swelling.   Gastrointestinal: Negative for nausea, diarrhea, constipation, blood in stool and abdominal distention.   Genitourinary: Negative.    Musculoskeletal: Negative.  Pain in her low back  Skin: Negative.    Neurological: Negative for dizziness, tremors, syncope, weakness, light-headedness and headaches.   Hematological: Does not bruise/bleed easily.   Psychiatric/Behavioral: Negative.         Physical Exam     Recent Vitals 10/24/2020   Height -   Weight -   BSA (m2) -   /88   Pulse 85   Temp 97.5   Temp src 1   SpO2 92   Some recent data might be hidden     Currently on 3 L of oxygen by nasal cannula  Constitutional: Oriented to person, place, and time and appears well-developed.   HEENT:  Normocephalic and atraumatic.  Eyes: Conjunctivae and EOM are normal. Pupils are equal, round, and reactive to light. No discharge.  No scleral icterus. Nose normal. Mouth/Throat: Oropharynx is clear and moist. No oropharyngeal exudate.    NECK: Normal range of motion. Neck supple. No JVD present. No tracheal deviation present. No thyromegaly present.   CARDIOVASCULAR: Normal rate, regular rhythm and intact distal pulses.  Exam reveals no gallop and no friction rub.  Systolic murmur present.  PULMONARY: Effort normal and breath sounds normal. No respiratory distress.No Wheezing or rales.  ABDOMEN: Soft. Bowel sounds are normal. No distension and no mass.  There is no tenderness. There is no rebound and no guarding. No HSM.  MUSCULOSKELETAL: Normal range of motion. No edema and no tenderness. Mild kyphosis,  tenderness  to palpation on her lower thoracic spine  Mobility is limited.  LYMPH NODES: Has no cervical, supraclavicular, axillary and groin adenopathy.   NEUROLOGICAL: Alert and oriented to person, place, and time. No cranial nerve deficit.  Normal muscle tone. Coordination normal.   GENITOURINARY: Deferred exam.  SKIN: Skin is warm and dry. No rash noted. No erythema. No pallor.   EXTREMITIES: No cyanosis, no clubbing, no edema. No Deformity.  PSYCHIATRIC: Normal mood, affect and behavior.      Lab Results     Recent Results (from the past 240 hour(s))   Urinalysis-UC if Indicated    Collection Time: 10/16/20  7:36 PM   Result Value Ref Range    Color, UA Yellow Colorless, Yellow, Straw, Light Yellow    Clarity, UA Clear Clear    Glucose, UA Negative Negative    Bilirubin, UA Negative Negative     Ketones, UA Negative Negative    Specific Gravity, UA 1.005 1.001 - 1.030    Blood, UA Negative Negative    pH, UA 5.0 4.5 - 8.0    Protein, UA Negative Negative mg/dL    Urobilinogen, UA <2.0 E.U./dL <2.0 E.U./dL, 2.0 E.U./dL    Nitrite, UA Negative Negative    Leukocytes, UA Negative Negative   Basic Metabolic Panel    Collection Time: 10/16/20 11:13 PM   Result Value Ref Range    Sodium 141 136 - 145 mmol/L    Potassium 3.7 3.5 - 5.0 mmol/L    Chloride 106 98 - 107 mmol/L    CO2 21 (L) 22 - 31 mmol/L    Anion Gap, Calculation 14 5 - 18 mmol/L    Glucose 100 70 - 125 mg/dL    Calcium 9.1 8.5 - 10.5 mg/dL    BUN 20 8 - 28 mg/dL    Creatinine 1.49 (H) 0.60 - 1.10 mg/dL    GFR MDRD Af Amer 41 (L) >60 mL/min/1.73m2    GFR MDRD Non Af Amer 34 (L) >60 mL/min/1.73m2   BNP(B-type Natriuretic Peptide)    Collection Time: 10/16/20 11:13 PM   Result Value Ref Range     (H) 0 - 137 pg/mL   Troponin I    Collection Time: 10/16/20 11:13 PM   Result Value Ref Range    Troponin I 0.06 0.00 - 0.29 ng/mL   HM1 (CBC with Diff)    Collection Time: 10/16/20 11:13 PM   Result Value Ref Range    WBC 12.6 (H) 4.0 - 11.0 thou/uL    RBC 4.72 3.80 - 5.40 mill/uL    Hemoglobin 14.2 12.0 - 16.0 g/dL    Hematocrit 43.7 35.0 - 47.0 %    MCV 93 80 - 100 fL    MCH 30.1 27.0 - 34.0 pg    MCHC 32.5 32.0 - 36.0 g/dL    RDW 14.1 11.0 - 14.5 %    Platelets 189 140 - 440 thou/uL    MPV 9.6 8.5 - 12.5 fL    Neutrophils % 77 (H) 50 - 70 %    Lymphocytes % 12 (L) 20 - 40 %    Monocytes % 10 2 - 10 %    Eosinophils % 1 0 - 6 %    Basophils % 1 0 - 2 %    Immature Granulocyte % 1 (H) <=0 %    Neutrophils Absolute 9.7 (H) 2.0 - 7.7 thou/uL    Lymphocytes Absolute 1.5 0.8 - 4.4 thou/uL    Monocytes Absolute 1.2 (H) 0.0 - 0.9 thou/uL    Eosinophils Absolute 0.1 0.0 - 0.4 thou/uL    Basophils Absolute 0.1 0.0 - 0.2 thou/uL    Immature Granulocyte Absolute 0.1 (H) <=0.0 thou/uL   Magnesium    Collection Time: 10/16/20 11:13 PM   Result Value Ref Range     Magnesium 2.2 1.8 - 2.6 mg/dL   ECG 12 lead nursing unit performed    Collection Time: 10/16/20 11:38 PM   Result Value Ref Range    SYSTOLIC BLOOD PRESSURE 111 mmHg    DIASTOLIC BLOOD PRESSURE 69 mmHg    VENTRICULAR RATE 72 BPM    ATRIAL RATE 72 BPM    P-R INTERVAL 140 ms    QRS DURATION 138 ms    Q-T INTERVAL 474 ms    QTC CALCULATION (BEZET) 519 ms    P Axis 44 degrees    R AXIS -68 degrees    T AXIS 99 degrees    MUSE DIAGNOSIS       Normal sinus rhythm  Possible Left atrial enlargement  Left axis deviation  Left bundle branch block  Abnormal ECG  When compared with ECG of 04-JUL-2020 07:45,  No significant change was found  Confirmed by SEE ED PROVIDER NOTE FOR, ECG INTERPRETATION ([id number]),  SHAHRAM KEANE ([id number]) on 10/17/2020 10:08:20 AM     COVID-19 Virus PCR MRF    Collection Time: 10/17/20 12:07 AM    Specimen: Respiratory   Result Value Ref Range    COVID-19 VIRUS SPECIMEN SOURCE Nasopharyngeal     2019-nCOV Not Detected    Comprehensive metabolic panel    Collection Time: 10/17/20  6:05 AM   Result Value Ref Range    Sodium 140 136 - 145 mmol/L    Potassium 3.5 3.5 - 5.0 mmol/L    Chloride 107 98 - 107 mmol/L    CO2 24 22 - 31 mmol/L    Anion Gap, Calculation 9 5 - 18 mmol/L    Glucose 103 70 - 125 mg/dL    BUN 18 8 - 28 mg/dL    Creatinine 1.37 (H) 0.60 - 1.10 mg/dL    GFR MDRD Af Amer 46 (L) >60 mL/min/1.73m2    GFR MDRD Non Af Amer 38 (L) >60 mL/min/1.73m2    Bilirubin, Total 0.5 0.0 - 1.0 mg/dL    Calcium 8.6 8.5 - 10.5 mg/dL    Protein, Total 6.1 6.0 - 8.0 g/dL    Albumin 2.9 (L) 3.5 - 5.0 g/dL    Alkaline Phosphatase 50 45 - 120 U/L    AST 16 0 - 40 U/L    ALT 9 0 - 45 U/L   HM1 (CBC with Diff)    Collection Time: 10/17/20  6:05 AM   Result Value Ref Range    WBC 8.6 4.0 - 11.0 thou/uL    RBC 4.40 3.80 - 5.40 mill/uL    Hemoglobin 13.1 12.0 - 16.0 g/dL    Hematocrit 40.4 35.0 - 47.0 %    MCV 92 80 - 100 fL    MCH 29.8 27.0 - 34.0 pg    MCHC 32.4 32.0 - 36.0 g/dL    RDW 14.0 11.0 - 14.5 %     Platelets 188 140 - 440 thou/uL    MPV 9.8 8.5 - 12.5 fL    Neutrophils % 66 50 - 70 %    Lymphocytes % 18 (L) 20 - 40 %    Monocytes % 14 (H) 2 - 10 %    Eosinophils % 2 0 - 6 %    Basophils % 1 0 - 2 %    Immature Granulocyte % 0 <=0 %    Neutrophils Absolute 5.6 2.0 - 7.7 thou/uL    Lymphocytes Absolute 1.5 0.8 - 4.4 thou/uL    Monocytes Absolute 1.2 (H) 0.0 - 0.9 thou/uL    Eosinophils Absolute 0.2 0.0 - 0.4 thou/uL    Basophils Absolute 0.1 0.0 - 0.2 thou/uL    Immature Granulocyte Absolute 0.0 <=0.0 thou/uL   Magnesium    Collection Time: 10/17/20  6:05 AM   Result Value Ref Range    Magnesium 2.2 1.8 - 2.6 mg/dL   Magnesium    Collection Time: 10/18/20  5:56 AM   Result Value Ref Range    Magnesium 2.2 1.8 - 2.6 mg/dL   HM2(CBC w/o Differential)    Collection Time: 10/18/20  5:56 AM   Result Value Ref Range    WBC 9.9 4.0 - 11.0 thou/uL    RBC 4.44 3.80 - 5.40 mill/uL    Hemoglobin 13.3 12.0 - 16.0 g/dL    Hematocrit 41.2 35.0 - 47.0 %    MCV 93 80 - 100 fL    MCH 30.0 27.0 - 34.0 pg    MCHC 32.3 32.0 - 36.0 g/dL    RDW 14.0 11.0 - 14.5 %    Platelets 166 140 - 440 thou/uL    MPV 9.4 8.5 - 12.5 fL   Basic Metabolic Panel    Collection Time: 10/18/20  5:56 AM   Result Value Ref Range    Sodium 140 136 - 145 mmol/L    Potassium 3.9 3.5 - 5.0 mmol/L    Chloride 107 98 - 107 mmol/L    CO2 24 22 - 31 mmol/L    Anion Gap, Calculation 9 5 - 18 mmol/L    Glucose 86 70 - 125 mg/dL    Calcium 9.1 8.5 - 10.5 mg/dL    BUN 17 8 - 28 mg/dL    Creatinine 1.14 (H) 0.60 - 1.10 mg/dL    GFR MDRD Af Amer 56 (L) >60 mL/min/1.73m2    GFR MDRD Non Af Amer 46 (L) >60 mL/min/1.73m2   BNP(B-type Natriuretic Peptide)    Collection Time: 10/18/20  6:18 PM   Result Value Ref Range    BNP 2,558 (H) 0 - 137 pg/mL   Potassium    Collection Time: 10/19/20  6:01 AM   Result Value Ref Range    Potassium 3.8 3.5 - 5.0 mmol/L   Magnesium    Collection Time: 10/19/20  6:01 AM   Result Value Ref Range    Magnesium 1.9 1.8 - 2.6 mg/dL   BNP(B-type  Natriuretic Peptide)    Collection Time: 10/19/20  6:01 AM   Result Value Ref Range    BNP 2,067 (H) 0 - 137 pg/mL   Magnesium    Collection Time: 10/20/20  6:04 AM   Result Value Ref Range    Magnesium 2.0 1.8 - 2.6 mg/dL   Basic Metabolic Panel    Collection Time: 10/20/20  6:04 AM   Result Value Ref Range    Sodium 137 136 - 145 mmol/L    Potassium 3.3 (L) 3.5 - 5.0 mmol/L    Chloride 98 98 - 107 mmol/L    CO2 29 22 - 31 mmol/L    Anion Gap, Calculation 10 5 - 18 mmol/L    Glucose 87 70 - 125 mg/dL    Calcium 9.1 8.5 - 10.5 mg/dL    BUN 15 8 - 28 mg/dL    Creatinine 1.17 (H) 0.60 - 1.10 mg/dL    GFR MDRD Af Amer 55 (L) >60 mL/min/1.73m2    GFR MDRD Non Af Amer 45 (L) >60 mL/min/1.73m2   Troponin I    Collection Time: 10/20/20 12:01 PM   Result Value Ref Range    Troponin I 0.05 0.00 - 0.29 ng/mL   BNP(B-type Natriuretic Peptide)    Collection Time: 10/20/20 12:01 PM   Result Value Ref Range     (H) 0 - 137 pg/mL   Echo Complete    Collection Time: 10/20/20  3:39 PM   Result Value Ref Range    LV volume diastolic 77.1 46 - 106 cm3    LV volume systolic 41.6 14 - 42 cm3    HR 80 bpm    IVSd 1.14 (!) 0.6 - 0.9 cm    LVIDd 5.0 3.8 - 5.2 cm    LVIDs 3.86 (!) 2.2 - 3.5 cm    LVOT diam 1.8 cm    LVOT mean gradient 1 mmHg    LVOT peak VTI 14.8 cm    LVOT mean carolyn 48.8 cm/s    LVOT peak carolyn 75 cm/s    LVOT peak gradient 2 mmHg    LV PWd 1.12 (!) 0.6 - 0.9 cm    MV E' lat carolyn 6.58 cm/s    MV E' med carolyn 3.29 cm/s    AV mean carolyn 103 cm/s    AV mean gradient 5 mmHg    AV VTI 27.6 cm    AV peak carolyn 155 cm/s    AO root 3.1 cm    AO ascending 2.9 cm    LA size 3.6 cm    LA/AO root ratio 1.16 no units    MV decel slope 3,640 mm/s2    MV decel time 174 ms    MV P 1/2 time 51 ms    MV peak A carolyn 139 cm/s    MV peak E carolyn 63.4 cm/s    TR peak carolyn 408.0 cm/s    BSA 1.76 m2    Hieg 62 in    Weight 2,404 lbs    /64 mmHg    IVS/PW ratio 1.0     TR peak gradent 66.6 mmHg    LV FS 22.8 28 - 44 %    Echo LVEF calculated 46  55 - 75 %    LA volume 58.1 mL    LV mass 215.0 g    AV area 1.4 cm2    AV DIM IND carolyn 0.5     MV area p 1/2 time 4.3 cm2    MV E/A Ratio 0.5     LVOT area 2.54 cm2    LVOT SV 37.6 cm3    AV peak gradient 9.6 mmHg    LV systolic volume index 23.6 8 - 24 cm3/m2    LV diastolic volume index 43.8 29 - 61 cm3/m2    LA volume index 33.0 mL/m2    LV mass index 122.1 g/m2    LV SVi 21.4 ml/m2    MV med E/e' ratio 19.3     MV lat E/e' ratio 9.6     LV CO 3.0 l/min    LV Ci 1.7 l/min/m2    LA area 2 19 cm2    LA area 1 18 cm2    Height 62.0 in    Weight 150 lbs    MV Avg E/e' Ratio 12.8 cm/s    LA length 5.0 cm    AV DIM IND VTI 0.5     Echo LVEF Estimated 40 %   Troponin I    Collection Time: 10/20/20  6:04 PM   Result Value Ref Range    Troponin I 0.06 0.00 - 0.29 ng/mL   Magnesium    Collection Time: 10/21/20  6:37 AM   Result Value Ref Range    Magnesium 2.0 1.8 - 2.6 mg/dL   Basic Metabolic Panel    Collection Time: 10/21/20  6:37 AM   Result Value Ref Range    Sodium 139 136 - 145 mmol/L    Potassium 3.4 (L) 3.5 - 5.0 mmol/L    Chloride 99 98 - 107 mmol/L    CO2 27 22 - 31 mmol/L    Anion Gap, Calculation 13 5 - 18 mmol/L    Glucose 85 70 - 125 mg/dL    Calcium 9.0 8.5 - 10.5 mg/dL    BUN 17 8 - 28 mg/dL    Creatinine 1.12 (H) 0.60 - 1.10 mg/dL    GFR MDRD Af Amer 57 (L) >60 mL/min/1.73m2    GFR MDRD Non Af Amer 47 (L) >60 mL/min/1.73m2   ECG 12 lead with MUSE, prior to first dose of antipsychotics.    Collection Time: 10/21/20  9:42 AM   Result Value Ref Range    SYSTOLIC BLOOD PRESSURE      DIASTOLIC BLOOD PRESSURE      VENTRICULAR RATE 72 BPM    ATRIAL RATE 72 BPM    P-R INTERVAL 134 ms    QRS DURATION 134 ms    Q-T INTERVAL 450 ms    QTC CALCULATION (BEZET) 492 ms    P Axis 80 degrees    R AXIS -78 degrees    T AXIS 106 degrees    MUSE DIAGNOSIS       Sinus rhythm with occasional and consecutive Premature ventricular complexes  Left axis deviation  Non-specific intra-ventricular conduction block  T wave  abnormality, consider lateral ischemia  Abnormal ECG  When compared with ECG of 16-OCT-2020 23:38,  Premature ventricular complexes are now Present  Confirmed by MILADY MARTINEZ MD LOC: ([id number]) on 10/21/2020 2:40:44 PM     Urinalysis-UC if Indicated    Collection Time: 10/21/20 11:07 AM   Result Value Ref Range    Color, UA Straw Colorless, Yellow, Straw, Light Yellow    Clarity, UA Clear Clear    Glucose, UA Negative Negative    Bilirubin, UA Negative Negative    Ketones, UA Negative Negative    Specific Gravity, UA 1.005 1.001 - 1.030    Blood, UA Negative Negative    pH, UA 6.0 4.5 - 8.0    Protein, UA Negative Negative mg/dL    Urobilinogen, UA <2.0 E.U./dL <2.0 E.U./dL, 2.0 E.U./dL    Nitrite, UA Negative Negative    Leukocytes, UA Negative Negative   Potassium    Collection Time: 10/21/20  9:06 PM   Result Value Ref Range    Potassium 3.8 3.5 - 5.0 mmol/L   Potassium - Next AM    Collection Time: 10/22/20  5:53 AM   Result Value Ref Range    Potassium 3.5 3.5 - 5.0 mmol/L   Basic Metabolic Panel    Collection Time: 10/22/20 11:59 AM   Result Value Ref Range    Sodium 137 136 - 145 mmol/L    Potassium 4.3 3.5 - 5.0 mmol/L    Chloride 101 98 - 107 mmol/L    CO2 23 22 - 31 mmol/L    Anion Gap, Calculation 13 5 - 18 mmol/L    Glucose 97 70 - 125 mg/dL    Calcium 9.4 8.5 - 10.5 mg/dL    BUN 17 8 - 28 mg/dL    Creatinine 1.06 0.60 - 1.10 mg/dL    GFR MDRD Af Amer >60 >60 mL/min/1.73m2    GFR MDRD Non Af Amer 51 (L) >60 mL/min/1.73m2   Potassium - Next AM    Collection Time: 10/23/20  6:12 AM   Result Value Ref Range    Potassium 3.7 3.5 - 5.0 mmol/L   Basic Metabolic Panel    Collection Time: 10/24/20  6:08 AM   Result Value Ref Range    Sodium 140 136 - 145 mmol/L    Potassium 4.1 3.5 - 5.0 mmol/L    Chloride 103 98 - 107 mmol/L    CO2 24 22 - 31 mmol/L    Anion Gap, Calculation 13 5 - 18 mmol/L    Glucose 89 70 - 125 mg/dL    Calcium 9.9 8.5 - 10.5 mg/dL    BUN 18 8 - 28 mg/dL    Creatinine 1.05 0.60 - 1.10  mg/dL    GFR MDRD Af Amer >60 >60 mL/min/1.73m2    GFR MDRD Non Af Amer 51 (L) >60 mL/min/1.73m2   Basic Metabolic Panel    Collection Time: 10/26/20  9:55 AM   Result Value Ref Range    Sodium 141 136 - 145 mmol/L    Potassium 4.7 3.5 - 5.0 mmol/L    Chloride 106 98 - 107 mmol/L    CO2 14 (L) 22 - 31 mmol/L    Anion Gap, Calculation 21 (H) 5 - 18 mmol/L    Glucose 91 70 - 125 mg/dL    Calcium 10.0 8.5 - 10.5 mg/dL    BUN 24 8 - 28 mg/dL    Creatinine 1.41 (H) 0.60 - 1.10 mg/dL    GFR MDRD Af Amer 44 (L) >60 mL/min/1.73m2    GFR MDRD Non Af Amer 36 (L) >60 mL/min/1.73m2   Calcium    Collection Time: 10/26/20  9:55 AM   Result Value Ref Range    Calcium 10.0 8.5 - 10.5 mg/dL   Magnesium    Collection Time: 10/26/20  9:55 AM   Result Value Ref Range    Magnesium 2.1 1.8 - 2.6 mg/dL   Phosphorus    Collection Time: 10/26/20  9:55 AM   Result Value Ref Range    Phosphorus 4.2 2.5 - 4.5 mg/dL   HM2(CBC w/o Differential)    Collection Time: 10/26/20  9:55 AM   Result Value Ref Range    WBC 10.8 4.0 - 11.0 thou/uL    RBC 4.90 3.80 - 5.40 mill/uL    Hemoglobin 14.5 12.0 - 16.0 g/dL    Hematocrit 45.7 35.0 - 47.0 %    MCV 93 80 - 100 fL    MCH 29.6 27.0 - 34.0 pg    MCHC 31.7 (L) 32.0 - 36.0 g/dL    RDW 13.8 11.0 - 14.5 %    Platelets 185 140 - 440 thou/uL    MPV 10.2 8.5 - 12.5 fL            Imaging Results     Echo Complete    Result Date: 10/20/2020    Normal left ventricular size.The estimated left ventricular ejection fraction is 40%. This represents a moderately decreased ejection fraction. Mild hypertrophy noted. Abnormal septal motion consistent with left bundle branch block. Left ventricular diastolic dysfunction. Normal left atrial pressure.   Normal right ventricular size and systolic function.   Moderate pulmonary hypertension present. The estimated systolic pulmonary artery pressure is 69 mmhg.   When compared to the previous study dated 7/27/2020, pulmonary pressure is elevated now.      Xr Chest 2  Views    Result Date: 10/16/2020  EXAM: XR CHEST 2 VIEWS LOCATION: [facility] DATE/TIME: 10/16/2020 10:55 PM INDICATION: Hypoxia, shortness of breath. COMPARISON: 7/4/2020.     Heart size upper limits of normal. Old lower right rib fractures. Presumed fat pad right cardiophrenic angle. Emphysematous change.    Ct Head Without Contrast    Result Date: 10/21/2020  EXAM: CT HEAD WO CONTRAST LOCATION: [facility] DATE/TIME: 10/21/2020 11:25 AM INDICATION: Altered mental status. Confusion. New Onset Delirium COMPARISON: 5/29/2020 CT sinus. TECHNIQUE: Routine without IV contrast. Multiplanar reformats. Dose reduction techniques were used. FINDINGS: INTRACRANIAL CONTENTS: No intracranial hemorrhage, extraaxial collection, or mass effect.  No CT evidence of acute infarct. 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 Mild presumed chronic small vessel ischemic changes. Mild generalized volume loss. No hydrocephalus. VISUALIZED ORBITS/SINUSES/MASTOIDS: No intraorbital abnormality. Unchanged sequela of chronic left sphenoid sinusitis. Opacification of the maxillary sinuses. No middle ear or mastoid effusion. BONES/SOFT TISSUES: No acute abnormality.     1.  No acute intracranial abnormality. 2.  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 3.  Mild presumed chronic small vessel ischemic changes.    Ct Lumbar Spine Without Contrast    Result Date: 10/16/2020  EXAM: CT LUMBAR SPINE WO CONTRAST LOCATION: [facility] DATE/TIME: 10/16/2020 9:32 PM INDICATION: Back pain or radiculopathy, cancer or infection suspected. COMPARISON: CT chest 06/12/2020 and CT chest, abdomen, and pelvis 06/11/2019, plain film chest of 06/24/2020. TECHNIQUE: Routine without IV contrast. Multiplanar reformats.  Dose reduction techniques were used. FINDINGS: VERTEBRA: 5 lumbar-type vertebral bodies. Mild broad right convexity lumbar curvature. Alignment is  otherwise normal. Mild inferior endplate compression of T12 is new since the 06/12/2020 CT and the 06/24/2020 plain film. Approximately 50% loss of height  anteriorly. Fracture lines are still evident with some mild prevertebral stranding/hematoma suggesting this is likely an acute/subacute fracture. Mild retropulsion of the posteroinferior cortex with a prominent right paracentral extrusion also noted. There is only mild narrowing of the canal though there is significant right lateral recess narrowing. No other fracture. Marked loss of disc space height at all lumbar levels apart from L4-L5 where it is mild to moderate. Vacuum disc disease at all lumbar levels. Moderate L5-S1 facet arthropathy. CANAL/FORAMINA: Mild to moderate L4-L5 and mild L2-L3 canal narrowing. Mild to moderate left L3-L4 and moderate to marked right L5-S1 degenerative foraminal narrowing. PARASPINAL: Left iliopsoas atrophy. Left renal cyst. No other extraspinal abnormality.     1.  Likely recent acute/subacute inferior endplate compression of T12 with approximately 50% loss of height. Mild buckling and retropulsion of the posteroinferior cortex in combination with a right paracentral extrusion results in mild canal narrowing though there is significant right lateral recess narrowing. 2.  No other fracture 3.  Moderate to marked lumbar spondylosis elsewhere with mild to moderate narrowing of the canal at L4-L5 and mild changes at L2-L3. Moderate to marked right L5-S1 degenerative foraminal narrowing. NOTE: ABNORMAL REPORT THE DICTATION ABOVE DESCRIBES AN ABNORMALITY FOR WHICH FOLLOW-UP IS NEEDED.     Us Arterial Segmental Pressures Legs 3 Or More Levels    Result Date: 10/18/2020  [facility] LOCATION: [facility] DATE: 10/18/2020 1. ANKLE BRACHIAL INDICES AT REST 2. SEGMENTAL PRESSURES INDICATION: Leg discoloration, leg temperature change, diabetes, claudication and leg pain, hypertension. COMPARISON: None.  FINDINGS: SEGMENTAL BP RIGHT (mmHg) Brachial: 124 Low Thigh: 213; Index 1.32 Calf: 227; Index 1.38 Ankle (PT): 177; Index 1.08 Ankle (DP): 152; Index 0.93 Digit: 94; Index 0.57 LEFT (mmHg) Brachial: 164 Low Thigh: 133; Index 0.81 Calf: 127; Index 0.77 Ankle (PT): 111; Index 0.68 Ankle (DP): 117; Index 0.71 Digit: 106; Index 0.65 Patient was unable to exercise due to fall risk and shortness of breath. There are multiphasic right posterior tibial waveforms. Monophasic right dorsalis pedis and left PT/DP waveforms.     CONCLUSION: 1. Left RONY is abnormal at 0.71 consistent with moderate resting arterial sufficiency. Segmental pressures suggest SFA or proximal inflow disease. Toe brachial index is near normal. 2. Right RONY at rest is normal at 1.08. Toe brachial index is 0.57. Great digit pressure is considered sufficient for potential wound healing. Segmental pressures indicate component of infrapopliteal disease.             VASHTI Sanchez

## 2021-06-21 NOTE — LETTER
Letter by Paola Rose MBBS at      Author: Paola Rose MBBS Service: -- Author Type: --    Filed:  Encounter Date: 10/28/2020 Status: (Other)         Patient: Irene León   MR Number: [id number]   YOB: 1945   Date of Visit: 10/28/2020       [facility] Admission note      Patient: Irene León  MRN: [id number]  Date of Service: 10/28/2020      [facility] [[id number]]  Reason for Visit     Chief Complaint   Patient presents with   ? Review Of Multiple Medical Conditions   F/U PAIN /MUSCLE SPASMS    Code Status     FULL CODE    Assessment     -Acute low back pain patient not reporting any improvement with her current medication regimen  -T12 compression fracture  -ANATOLIY with elevated creatinine 1.4 in the TCU  -Osteoporosis restarted on Prolia  -Pain management no longer on narcotics; no discharge on narcotics including Percocet  -Acute metabolic encephalopathy/delirium felt to be secondary to opioid medication  -CHF with reduced ejection fraction acute exacerbation  -- ANATOLIY /cardiorenal syndrome  -History of COPD and asthma  -Acute on chronic hypoxic respiratory failure ON 3L BY NC  -Peripheral arterial disease  -History of factor V Leiden and occipital stroke on Xarelto  -History of breast cancer  -Concern for mild dementia with baseline cognitive impairment  Slums 13/30  -Generalized weakness      Plan     Patient has been admitted to the TCU for strengthening and rehab  She was admitted to the hospital with a T12 compression fracture and back pain.  Seen by multiple specialist including orthopedics cardiology as well as pain management  Orthopedics saw her and will discharge her with a TLSO brace in place.  She is compliant with her bracing.  Unfortunately pain remains her biggest challenge today.  She has been afraid to move.  She has been experiencing muscle spasms.  She received her scheduled Tylenol.  Celebrex twice daily 200 mg has been added to her  regimen.  Nursing also gave her her Percocet.  She is also on scheduled muscle relaxers Robaxin which she received on time.  Topical Voltaren gel was applied on her back with no improvement she also has an ice pack.  Unfortunately patient is profoundly uncomfortable with muscle spasms and reports a high pain level currently.  She is compliant with her bracing.  Plan is to discontinue her Robaxin and switch her to Vistaril 25 mg 4 times daily.  Also will give her an additional 50 mg of Vistaril stat.  Hoping that will help her with muscle relaxation.  Continue to monitor pain  She is currently being limited in all her activities because of severe pain.  Oxygen saturation is within normal limits on 3 L with no taper attempted at this is her home regimen  Recheck slums is 23/30    History     Patient is a very pleasant 75 y.o. female who is admitted to TCU.  Patient presented to the hospital with acute low back pain.  She was admitted in the hospital.  Imaging revealed acute/subacute inferior endplate compression fracture of T12 with 50% loss of height.  Orthopedics was consulted.  She has been discharged weightbearing as tolerated with a TLSO brace to be worn all day  Outpatient follow-up for consideration of kyphoplasty recommended in 2 to 3 weeks.  She has underlying history of osteoporosis and her Prolia will be restarted  Pain management reviewed with the patient she is on multiple medications including Tylenol with gabapentin.  Also given Robaxin and Lidoderm patch.  Calcitonin added for bone healing.  It has been recommended that oxycodone and hydromorphone be avoided due to delirium.  While in the hospital she developed hospital associated delirium felt to be related to opioid medications.  Unfortunately pain remains her biggest challenge.  Today she is reporting 10 out of 10 pain with severe muscle spasms this has been ongoing since last night  Nursing did give her her scheduled Tylenol muscle relaxer Celebrex  as well as her topical pain gels were applied on her back with no improvement she was given an ice pack with no improvement.  She also has a TLSO on.  She is moAning in pain and reporting that her pain is not improving.  Unfortunately we are limited in our ability to push stronger pain pills because of severe cognitive impairment that she experienced while she was on narcotics  Those were discontinued with improvement in cognitive status  She also has congestive heart failure.  She had an acute exacerbation felt to be secondary to medication noncompliance.  Echocardiogram revealed an acute ejection fraction of 30%.  She has been discharged on Lasix 40 mg daily.  2 times a day  She will require weight monitoring and management  Edema concerns are minimal today and weights appear to be stable.  She has had significant improvement in her cognitive status also  R/C RUPINDER 23/30      Past Medical History     Active Ambulatory (Non-Hospital) Problems    Diagnosis   ? Pain due to fracture   ? High risk medication use   ? Heart failure exacerbated by sotalol (H)   ? T12 compression fracture (H)   ? Acute on chronic diastolic congestive heart failure (H)   ? Metabolic encephalopathy   ? Low back pain   ? Compression fracture of L1 vertebra, sequela   ? Chronic respiratory failure with hypoxia (H)   ? Chronic obstructive pulmonary disease, unspecified COPD type (H)   ? Chronic systolic congestive heart failure (H)   ? Hypoxia   ? S/P hip replacement, left   ? Hip pain, left   ? Acute hypoxemic respiratory failure (H)   ? Chest pain   ? COPD exacerbation (H)   ? Herpes zoster without complication   ? Chest wall pain   ? Benign essential hypertension   ? Dyslipidemia   ? Acute respiratory failure with hypoxia (H)   ? Pulsatile tinnitus of both ears   ? Hot flashes due to tamoxifen   ? Pleural nodules   ? Hot flashes, menopausal   ? Malignant neoplasm of central portion of left female breast (H)   ? CKD (chronic kidney disease)  stage 3, GFR 30-59 ml/min   ? Dyspnea   ? Insomnia   ? Leg pain, left   ? Femur fracture (H)   ? GERD (gastroesophageal reflux disease)   ? Post menopausal syndrome   ? OP (osteoporosis)   ? Hypertension   ? Pulmonary emphysema, unspecified emphysema type (H)   ? Hyperlipidemia   ? Centrilobular emphysema (H)   ? Anisocoria   ? OAB (overactive bladder)   ? Factor 5 Leiden mutation, heterozygous (H)   ? Family history of blood disease   ? Bladder incontinence   ? Physical deconditioning   ? Cardiomyopathy, idiopathic (H)   ? Depression with anxiety   ? Osteoarthritis of hip   ? Herniated intervertebral disc     Past Medical History:   Diagnosis Date   ? Arrhythmia    ? Breast cancer (H) 2017   ? CHF (congestive heart failure) (H)    ? COPD (chronic obstructive pulmonary disease) (H)    ? Coronary artery disease    ? GERD (gastroesophageal reflux disease)    ? Hx of radiation therapy 2017   ? Hyperlipidemia    ? Hypertension    ? Idiopathic cardiomyopathy (H)        Past Social History     Reviewed, and she  reports that she quit smoking about 13 years ago. She quit smokeless tobacco use about 16 years ago. She reports that she does not drink alcohol or use drugs.    Family History     Reviewed, and family history includes Breast cancer in her maternal aunt; Osteoporosis in an other family member; Prostate cancer in her brother and maternal uncle.    Medication List   Post Discharge Medication Reconciliation Status: discharge medications reconciled, continue medications without change   Current Outpatient Medications on File Prior to Visit   Medication Sig Dispense Refill   ? acetaminophen (TYLENOL) 325 MG tablet Take 2 tablets (650 mg total) by mouth 3 (three) times a day. (Patient taking differently: Take 650 mg by mouth 4 (four) times a day. )  0   ? ADVAIR DISKUS 500-50 mcg/dose DISKUS USE 1 INHALATION TWICE A DAY 60 each 11   ? albuterol (PROAIR HFA;PROVENTIL HFA;VENTOLIN HFA) 90 mcg/actuation inhaler Inhale 2  puffs every 4 (four) hours as needed for wheezing. 1 Inhaler 0   ? ascorbic acid, vitamin C, (VITAMIN C) 1000 MG tablet Take 1,000 mg by mouth daily. Airborne 1 tab daily     ? aspirin 81 MG EC tablet Take 81 mg by mouth daily.     ? azelastine (ASTELIN) 137 mcg (0.1 %) nasal spray 2 sprays into each nostril daily.     ? benzonatate (TESSALON) 200 MG capsule Take 1 capsule (200 mg total) by mouth 3 (three) times a day as needed for cough. 90 capsule 2   ? calcitonin, salmon, (MIACALCIN) 200 unit/actuation nasal spray Apply 1 spray into alternating nostrils daily for 14 days.  0   ? celecoxib (CELEBREX) 200 MG capsule Take 200 mg by mouth 2 (two) times a day.     ? chlorhexidine (PERIDEX) 0.12 % solution Apply 15 mL to the mouth or throat at bedtime as needed.      ? cholecalciferol, vitamin D3, 1,000 unit tablet Take 1,000 Units by mouth daily.     ? diclofenac sodium (VOLTAREN) 1 % Gel Apply 4 g to area of pain in low back 4 times daily  0   ? EPINEPHrine (EPIPEN/ADRENACLICK/AUVI-Q) 0.3 mg/0.3 mL injection Inject 0.3 mg into the shoulder, thigh, or buttocks.     ? famotidine (PEPCID) 20 MG tablet Take 20 mg by mouth daily.     ? fluticasone (FLONASE) 50 mcg/actuation nasal spray Apply 1 spray into each nostril as needed.      ? furosemide (LASIX) 40 MG tablet Take 1 tablet (40 mg total) by mouth 2 (two) times a day at 9am and 6pm.  0   ? gabapentin (NEURONTIN) 300 MG capsule Take 2 capsules (600 mg total) by mouth 2 (two) times a day. 360 capsule 2   ? HYDROcodone-acetaminophen 5-325 mg per tablet Take 1 tablet by mouth every 4 (four) hours as needed. 20 tablet 0   ? ipratropium (ATROVENT HFA) 17 mcg/actuation inhaler Inhale 2 puffs every 6 (six) hours. 3 Inhaler 3   ? ipratropium-albuterol (DUO-NEB) 0.5-2.5 mg/3 mL nebulizer 1 neb 4 times a day until seen by primary and then as needed or as directed 60 vial 1   ? lidocaine (XYLOCAINE) 5 % ointment Apply to area of pain in low back 4 times daily 35.44 g 0   ?  losartan (COZAAR) 50 MG tablet TAKE 1 TABLET DAILY 90 tablet 3   ? melatonin 3 mg Tab tablet Take 1 tablet (3 mg total) by mouth at bedtime.  0   ? methocarbamoL (ROBAXIN) 750 MG tablet Take 1 tablet (750 mg total) by mouth every 6 (six) hours for 14 days.  0   ? metoprolol succinate (TOPROL-XL) 50 MG 24 hr tablet Take 1.5 tablets (75 mg total) by mouth daily. 135 tablet 3   ? mirabegron (MYRBETRIQ) 50 mg Tb24 ER tablet Take 1 tablet (50 mg total) by mouth daily. 90 tablet 3   ? omalizumab (XOLAIR) 150 mg/mL Syrg injection Inject 300 mg under the skin every 14 (fourteen) days.      ? pantoprazole (PROTONIX) 40 MG tablet Take 1 tablet (40 mg total) by mouth daily. 90 tablet 4   ? polyethylene glycol (MIRALAX) 17 gram packet Take 1 packet (17 g total) by mouth daily as needed.  0   ? QUEtiapine (SEROQUEL) 25 MG tablet Take 0.5 tablets (12.5 mg total) by mouth 3 (three) times a day as needed (Agitation that is not redirectable.).  0   ? rivaroxaban ANTICOAGULANT (XARELTO) 20 mg tablet Take 20 mg by mouth at bedtime.     ? senna-docusate (PERICOLACE) 8.6-50 mg tablet Take 1 tablet by mouth daily as needed for constipation.  0   ? simvastatin (ZOCOR) 40 MG tablet Take 1 tablet (40 mg total) by mouth at bedtime. 90 tablet 3   ? sodium chloride (OCEAN) 0.65 % nasal spray 2 sprays into each nostril as needed.     ? solifenacin (VESICARE) 5 MG tablet Take 1 tablet (5 mg total) by mouth at bedtime. 90 tablet 3   ? tamoxifen (NOLVADEX) 20 MG tablet TAKE 1 TABLET DAILY 90 tablet 4   ? traZODone (DESYREL) 50 MG tablet TAKE 1 TABLET(50 MG) BY MOUTH AT BEDTIME AS NEEDED 90 tablet 2   ? triamcinolone (KENALOG) 0.1 % cream Apply to rash on arms twice daily for one week 30 g 0   ? triamcinolone (KENALOG) 0.5 % ointment Apply o foot twice a day 30 g 1   ? venlafaxine (EFFEXOR-XR) 150 MG 24 hr capsule TAKE 1 CAPSULE DAILY 90 capsule 4     Current Facility-Administered Medications on File Prior to Visit   Medication Dose Route Frequency  Provider Last Rate Last Dose   ? denosumab 60 mg (PROLIA 60 mg/ml)  60 mg Subcutaneous Q6 Months Suzanna Shane MD   60 mg at 02/04/20 0554       Allergies     Allergies   Allergen Reactions   ? Sulfa (Sulfonamide Antibiotics) Rash     Headaches and dizziness.   ? Homeopathic Drugs Unknown and Other (See Comments)     Comment: runny nose, Comment: runny nose   ? Mold Extracts    ? Morphine (Pf)      hallucinate   ? Lisinopril Cough, Unknown and Itching     Comment: cough, Comment: cough   ? Sulfacetamide Sodium Rash       Review of Systems   A comprehensive review of 14 systems was done. Pertinent findings noted here and in history of present illness. All the rest negative.  Constitutional: Negative.  Negative for fever, chills, she has activity change, appetite change and fatigue.   HENT: Negative for congestion and facial swelling.    Eyes: Negative for photophobia, redness and visual disturbance.   Respiratory: Negative for cough and chest tightness.    Cardiovascular: Negative for chest pain, palpitations and leg swelling.   Gastrointestinal: Negative for nausea, diarrhea, constipation, blood in stool and abdominal distention.   Genitourinary: Negative.    Musculoskeletal: Negative.  Pain in her low back; today reporting intractable muscle spasms which are not improving  Skin: Negative.    Neurological: Negative for dizziness, tremors, syncope, weakness, light-headedness and headaches.   Hematological: Does not bruise/bleed easily.   Psychiatric/Behavioral: Negative.  Significant distress due to pain      Physical Exam     Recent Vitals 10/24/2020   Height -   Weight -   BSA (m2) -   /88   Pulse 85   Temp 97.5   Temp src 1   SpO2 92   Some recent data might be hidden     Currently on 3 L of oxygen by nasal cannula  Constitutional: Oriented to person, place, and time and appears well-developed.   HEENT:  Normocephalic and atraumatic.  Eyes: Conjunctivae and EOM are normal. Pupils are equal, round, and  reactive to light. No discharge.  No scleral icterus. Nose normal. Mouth/Throat: Oropharynx is clear and moist. No oropharyngeal exudate.    NECK: Normal range of motion. Neck supple. No JVD present. No tracheal deviation present. No thyromegaly present.   CARDIOVASCULAR: Normal rate, regular rhythm and intact distal pulses.  Exam reveals no gallop and no friction rub.  Systolic murmur present.  PULMONARY: Effort normal and breath sounds normal. No respiratory distress.No Wheezing or rales.  ABDOMEN: Soft. Bowel sounds are normal. No distension and no mass.  There is no tenderness. There is no rebound and no guarding. No HSM.  MUSCULOSKELETAL: Normal range of motion. No edema and no tenderness. Mild kyphosis,  tenderness  to palpation on her lower thoracic spine  Mobility is limited.  LYMPH NODES: Has no cervical, supraclavicular, axillary and groin adenopathy.   NEUROLOGICAL: Alert and oriented to person, place, and time. No cranial nerve deficit.  Normal muscle tone. Coordination normal.   GENITOURINARY: Deferred exam.  SKIN: Skin is warm and dry. No rash noted. No erythema. No pallor.   EXTREMITIES: No cyanosis, no clubbing, no edema. No Deformity.  PSYCHIATRIC: Normal mood, affect and behavior.  High anxiety due to pain      Lab Results     Recent Results (from the past 240 hour(s))   BNP(B-type Natriuretic Peptide)    Collection Time: 10/18/20  6:18 PM   Result Value Ref Range    BNP 2,558 (H) 0 - 137 pg/mL   Potassium    Collection Time: 10/19/20  6:01 AM   Result Value Ref Range    Potassium 3.8 3.5 - 5.0 mmol/L   Magnesium    Collection Time: 10/19/20  6:01 AM   Result Value Ref Range    Magnesium 1.9 1.8 - 2.6 mg/dL   BNP(B-type Natriuretic Peptide)    Collection Time: 10/19/20  6:01 AM   Result Value Ref Range    BNP 2,067 (H) 0 - 137 pg/mL   Magnesium    Collection Time: 10/20/20  6:04 AM   Result Value Ref Range    Magnesium 2.0 1.8 - 2.6 mg/dL   Basic Metabolic Panel    Collection Time: 10/20/20  6:04  AM   Result Value Ref Range    Sodium 137 136 - 145 mmol/L    Potassium 3.3 (L) 3.5 - 5.0 mmol/L    Chloride 98 98 - 107 mmol/L    CO2 29 22 - 31 mmol/L    Anion Gap, Calculation 10 5 - 18 mmol/L    Glucose 87 70 - 125 mg/dL    Calcium 9.1 8.5 - 10.5 mg/dL    BUN 15 8 - 28 mg/dL    Creatinine 1.17 (H) 0.60 - 1.10 mg/dL    GFR MDRD Af Amer 55 (L) >60 mL/min/1.73m2    GFR MDRD Non Af Amer 45 (L) >60 mL/min/1.73m2   Troponin I    Collection Time: 10/20/20 12:01 PM   Result Value Ref Range    Troponin I 0.05 0.00 - 0.29 ng/mL   BNP(B-type Natriuretic Peptide)    Collection Time: 10/20/20 12:01 PM   Result Value Ref Range     (H) 0 - 137 pg/mL   Echo Complete    Collection Time: 10/20/20  3:39 PM   Result Value Ref Range    LV volume diastolic 77.1 46 - 106 cm3    LV volume systolic 41.6 14 - 42 cm3    HR 80 bpm    IVSd 1.14 (!) 0.6 - 0.9 cm    LVIDd 5.0 3.8 - 5.2 cm    LVIDs 3.86 (!) 2.2 - 3.5 cm    LVOT diam 1.8 cm    LVOT mean gradient 1 mmHg    LVOT peak VTI 14.8 cm    LVOT mean carolyn 48.8 cm/s    LVOT peak carolyn 75 cm/s    LVOT peak gradient 2 mmHg    LV PWd 1.12 (!) 0.6 - 0.9 cm    MV E' lat carolyn 6.58 cm/s    MV E' med carolyn 3.29 cm/s    AV mean carolyn 103 cm/s    AV mean gradient 5 mmHg    AV VTI 27.6 cm    AV peak carolyn 155 cm/s    AO root 3.1 cm    AO ascending 2.9 cm    LA size 3.6 cm    LA/AO root ratio 1.16 no units    MV decel slope 3,640 mm/s2    MV decel time 174 ms    MV P 1/2 time 51 ms    MV peak A carolyn 139 cm/s    MV peak E carolyn 63.4 cm/s    TR peak carolyn 408.0 cm/s    BSA 1.76 m2    Hieght 62 in    Weight 2,404 lbs    /64 mmHg    IVS/PW ratio 1.0     TR peak gradent 66.6 mmHg    LV FS 22.8 28 - 44 %    Echo LVEF calculated 46 55 - 75 %    LA volume 58.1 mL    LV mass 215.0 g    AV area 1.4 cm2    AV DIM IND carolyn 0.5     MV area p 1/2 time 4.3 cm2    MV E/A Ratio 0.5     LVOT area 2.54 cm2    LVOT SV 37.6 cm3    AV peak gradient 9.6 mmHg    LV systolic volume index 23.6 8 - 24 cm3/m2    LV diastolic volume  index 43.8 29 - 61 cm3/m2    LA volume index 33.0 mL/m2    LV mass index 122.1 g/m2    LV SVi 21.4 ml/m2    MV med E/e' ratio 19.3     MV lat E/e' ratio 9.6     LV CO 3.0 l/min    LV Ci 1.7 l/min/m2    LA area 2 19 cm2    LA area 1 18 cm2    Height 62.0 in    Weight 150 lbs    MV Avg E/e' Ratio 12.8 cm/s    LA length 5.0 cm    AV DIM IND VTI 0.5     Echo LVEF Estimated 40 %   Troponin I    Collection Time: 10/20/20  6:04 PM   Result Value Ref Range    Troponin I 0.06 0.00 - 0.29 ng/mL   Magnesium    Collection Time: 10/21/20  6:37 AM   Result Value Ref Range    Magnesium 2.0 1.8 - 2.6 mg/dL   Basic Metabolic Panel    Collection Time: 10/21/20  6:37 AM   Result Value Ref Range    Sodium 139 136 - 145 mmol/L    Potassium 3.4 (L) 3.5 - 5.0 mmol/L    Chloride 99 98 - 107 mmol/L    CO2 27 22 - 31 mmol/L    Anion Gap, Calculation 13 5 - 18 mmol/L    Glucose 85 70 - 125 mg/dL    Calcium 9.0 8.5 - 10.5 mg/dL    BUN 17 8 - 28 mg/dL    Creatinine 1.12 (H) 0.60 - 1.10 mg/dL    GFR MDRD Af Amer 57 (L) >60 mL/min/1.73m2    GFR MDRD Non Af Amer 47 (L) >60 mL/min/1.73m2   ECG 12 lead with MUSE, prior to first dose of antipsychotics.    Collection Time: 10/21/20  9:42 AM   Result Value Ref Range    SYSTOLIC BLOOD PRESSURE      DIASTOLIC BLOOD PRESSURE      VENTRICULAR RATE 72 BPM    ATRIAL RATE 72 BPM    P-R INTERVAL 134 ms    QRS DURATION 134 ms    Q-T INTERVAL 450 ms    QTC CALCULATION (BEZET) 492 ms    P Axis 80 degrees    R AXIS -78 degrees    T AXIS 106 degrees    MUSE DIAGNOSIS       Sinus rhythm with occasional and consecutive Premature ventricular complexes  Left axis deviation  Non-specific intra-ventricular conduction block  T wave abnormality, consider lateral ischemia  Abnormal ECG  When compared with ECG of 16-OCT-2020 23:38,  Premature ventricular complexes are now Present  Confirmed by JUAN LUCIO, MILADY LOC: ([id number]) on 10/21/2020 2:40:44 PM     Urinalysis-UC if Indicated    Collection Time: 10/21/20 11:07 AM    Result Value Ref Range    Color, UA Straw Colorless, Yellow, Straw, Light Yellow    Clarity, UA Clear Clear    Glucose, UA Negative Negative    Bilirubin, UA Negative Negative    Ketones, UA Negative Negative    Specific Gravity, UA 1.005 1.001 - 1.030    Blood, UA Negative Negative    pH, UA 6.0 4.5 - 8.0    Protein, UA Negative Negative mg/dL    Urobilinogen, UA <2.0 E.U./dL <2.0 E.U./dL, 2.0 E.U./dL    Nitrite, UA Negative Negative    Leukocytes, UA Negative Negative   Potassium    Collection Time: 10/21/20  9:06 PM   Result Value Ref Range    Potassium 3.8 3.5 - 5.0 mmol/L   Potassium - Next AM    Collection Time: 10/22/20  5:53 AM   Result Value Ref Range    Potassium 3.5 3.5 - 5.0 mmol/L   Basic Metabolic Panel    Collection Time: 10/22/20 11:59 AM   Result Value Ref Range    Sodium 137 136 - 145 mmol/L    Potassium 4.3 3.5 - 5.0 mmol/L    Chloride 101 98 - 107 mmol/L    CO2 23 22 - 31 mmol/L    Anion Gap, Calculation 13 5 - 18 mmol/L    Glucose 97 70 - 125 mg/dL    Calcium 9.4 8.5 - 10.5 mg/dL    BUN 17 8 - 28 mg/dL    Creatinine 1.06 0.60 - 1.10 mg/dL    GFR MDRD Af Amer >60 >60 mL/min/1.73m2    GFR MDRD Non Af Amer 51 (L) >60 mL/min/1.73m2   Potassium - Next AM    Collection Time: 10/23/20  6:12 AM   Result Value Ref Range    Potassium 3.7 3.5 - 5.0 mmol/L   Basic Metabolic Panel    Collection Time: 10/24/20  6:08 AM   Result Value Ref Range    Sodium 140 136 - 145 mmol/L    Potassium 4.1 3.5 - 5.0 mmol/L    Chloride 103 98 - 107 mmol/L    CO2 24 22 - 31 mmol/L    Anion Gap, Calculation 13 5 - 18 mmol/L    Glucose 89 70 - 125 mg/dL    Calcium 9.9 8.5 - 10.5 mg/dL    BUN 18 8 - 28 mg/dL    Creatinine 1.05 0.60 - 1.10 mg/dL    GFR MDRD Af Amer >60 >60 mL/min/1.73m2    GFR MDRD Non Af Amer 51 (L) >60 mL/min/1.73m2   Basic Metabolic Panel    Collection Time: 10/26/20  9:55 AM   Result Value Ref Range    Sodium 141 136 - 145 mmol/L    Potassium 4.7 3.5 - 5.0 mmol/L    Chloride 106 98 - 107 mmol/L    CO2 14 (L)  22 - 31 mmol/L    Anion Gap, Calculation 21 (H) 5 - 18 mmol/L    Glucose 91 70 - 125 mg/dL    Calcium 10.0 8.5 - 10.5 mg/dL    BUN 24 8 - 28 mg/dL    Creatinine 1.41 (H) 0.60 - 1.10 mg/dL    GFR MDRD Af Amer 44 (L) >60 mL/min/1.73m2    GFR MDRD Non Af Amer 36 (L) >60 mL/min/1.73m2   Calcium    Collection Time: 10/26/20  9:55 AM   Result Value Ref Range    Calcium 10.0 8.5 - 10.5 mg/dL   Magnesium    Collection Time: 10/26/20  9:55 AM   Result Value Ref Range    Magnesium 2.1 1.8 - 2.6 mg/dL   Phosphorus    Collection Time: 10/26/20  9:55 AM   Result Value Ref Range    Phosphorus 4.2 2.5 - 4.5 mg/dL   HM2(CBC w/o Differential)    Collection Time: 10/26/20  9:55 AM   Result Value Ref Range    WBC 10.8 4.0 - 11.0 thou/uL    RBC 4.90 3.80 - 5.40 mill/uL    Hemoglobin 14.5 12.0 - 16.0 g/dL    Hematocrit 45.7 35.0 - 47.0 %    MCV 93 80 - 100 fL    MCH 29.6 27.0 - 34.0 pg    MCHC 31.7 (L) 32.0 - 36.0 g/dL    RDW 13.8 11.0 - 14.5 %    Platelets 185 140 - 440 thou/uL    MPV 10.2 8.5 - 12.5 fL                  VASHTI Sanchez

## 2021-06-21 NOTE — LETTER
Letter by Paola Rose MBBS at      Author: Paola Rose MBBS Service: -- Author Type: --    Filed:  Encounter Date: 11/4/2020 Status: (Other)         Patient: Irene León   MR Number: [id number]   YOB: 1945   Date of Visit: 11/4/2020       [facility] Admission note      Patient: Irene León  MRN: [id number]  Date of Service: 11/4/2020      [facility] [[id number]]  Reason for Visit     Chief Complaint   Patient presents with   ? Problem Visit   F/U PAIN /MUSCLE SPASMS/ chf    Code Status     FULL CODE    Assessment     -Acute CHF exacerbation with persistent weight gain in spite of being on a higher dose of Lasix  -Ongoing concerns about cough and congestion with hypoxic respiratory failure  -T12 compression fracture  -ANATOLIY with elevated creatinine 1.4 in the TCU  -Osteoporosis restarted on Prolia  -Pain management no longer on narcotics; no discharge on narcotics including Percocet  -Acute metabolic encephalopathy/delirium felt to be secondary to opioid medication  -CHF with reduced ejection fraction acute exacerbation  -- ANATOLIY /cardiorenal syndrome  -History of COPD and asthma  -Acute on chronic hypoxic respiratory failure ON 3L BY NC  -Peripheral arterial disease  -History of factor V Leiden and occipital stroke on Xarelto  -History of breast cancer  -Concern for mild dementia with baseline cognitive impairment  Slums 13/30  -Generalized weakness      Plan     Patient has been admitted to the TCU for strengthening and rehab  Urgently because of ongoing concerns about weight gain with persistent cough and congestion.  She has ongoing coughing and difficulty breathing though her oxygen needs appear to be baseline at 3 L.  Staff has been giving her Mucinex.  She has clearly not responded to Lasix 80 and 40 regimen with higher weight gain noted of 1 pound further since yesterday and weight now at 150 pounds.  .  We will discontinue her Lasix.  Bumex 2 mg to be  "administered stat.  Bumex 2 mg in the morning and 1 mg in the afternoon for the next few days.  Recheck labs reviewed and her creatinine is 1.4 her discharge creatinine was also 1.4 so we will monitor trends without adjusting.  She will need a close monitoring of BMP again in 2 days.  Continue with her supplemental oxygen needs.  Chest x-ray to be done stat.  In light of her respiratory issues stat Covid testing was done and she has tested negative.  She does not \"need contact isolation precautions because of this reason.  Follow-up on results of labs and x-rays.  Send her to the ER if no response noted with changes in medication  Pain management reviewed and she continues to rate her pain is high we will not consider tapering during this visit as coughing could probably be exacerbating her pain.  Monitor pain and reassess at my next visit  Referral has been given to her orthopedic clinic for further treatments      History     Patient is a very pleasant 75 y.o. female who is admitted to TCU.  Seen today again because of worsening CHF symptoms with progressive weight gain weight is actually up 250 pounds she was put on a higher dose of Lasix with no improvement no resolution   she has been coughing a lot.  Staff has been giving her some cough syrup and Mucinex with some improvement  Patient presented to the hospital with acute low back pain.  She was admitted in the hospital.  Imaging revealed acute/subacute inferior endplate compression fracture of T12 with 50% loss of height.  Orthopedics was consulted.  She has been discharged weightbearing as tolerated with a TLSO brace to be worn all day  Outpatient follow-up for consideration of kyphoplasty recommended in 2 to 3 weeks.  Has been given to her for follow-up with orthopedics in addition she has developed piriformis syndrome on the right side and and the past that responded by injections.  We have given her a referral to see if that would be effective for " her.  She needs to follow-up with orthopedics.  Due to ongoing cough and congestion issue stat Covid testing was done which has come back negative.  She was tested prior on 10/27/2020 also and was negative.  She remains afebrile.  Pain remains her biggest issue.  Mood and behaviors have been stable      Past Medical History     Active Ambulatory (Non-Hospital) Problems    Diagnosis   ? Pain due to fracture   ? High risk medication use   ? Heart failure exacerbated by sotalol (H)   ? T12 compression fracture (H)   ? Acute on chronic diastolic congestive heart failure (H)   ? Metabolic encephalopathy   ? Low back pain   ? Compression fracture of L1 vertebra, sequela   ? Chronic respiratory failure with hypoxia (H)   ? Chronic obstructive pulmonary disease, unspecified COPD type (H)   ? Chronic systolic congestive heart failure (H)   ? Hypoxia   ? S/P hip replacement, left   ? Hip pain, left   ? Acute hypoxemic respiratory failure (H)   ? Chest pain   ? COPD exacerbation (H)   ? Herpes zoster without complication   ? Chest wall pain   ? Benign essential hypertension   ? Dyslipidemia   ? Acute respiratory failure with hypoxia (H)   ? Pulsatile tinnitus of both ears   ? Hot flashes due to tamoxifen   ? Pleural nodules   ? Hot flashes, menopausal   ? Malignant neoplasm of central portion of left female breast (H)   ? CKD (chronic kidney disease) stage 3, GFR 30-59 ml/min   ? Dyspnea   ? Insomnia   ? Leg pain, left   ? Femur fracture (H)   ? GERD (gastroesophageal reflux disease)   ? Post menopausal syndrome   ? OP (osteoporosis)   ? Hypertension   ? Pulmonary emphysema, unspecified emphysema type (H)   ? Hyperlipidemia   ? Centrilobular emphysema (H)   ? Anisocoria   ? OAB (overactive bladder)   ? Factor 5 Leiden mutation, heterozygous (H)   ? Family history of blood disease   ? Bladder incontinence   ? Physical deconditioning   ? Cardiomyopathy, idiopathic (H)   ? Depression with anxiety   ? Osteoarthritis of hip   ?  Herniated intervertebral disc     Past Medical History:   Diagnosis Date   ? Arrhythmia    ? Breast cancer (H) 2017   ? CHF (congestive heart failure) (H)    ? COPD (chronic obstructive pulmonary disease) (H)    ? Coronary artery disease    ? GERD (gastroesophageal reflux disease)    ? Hx of radiation therapy 2017   ? Hyperlipidemia    ? Hypertension    ? Idiopathic cardiomyopathy (H)        Past Social History     Reviewed, and she  reports that she quit smoking about 13 years ago. She quit smokeless tobacco use about 16 years ago. She reports that she does not drink alcohol or use drugs.    Family History     Reviewed, and family history includes Breast cancer in her maternal aunt; Osteoporosis in an other family member; Prostate cancer in her brother and maternal uncle.    Medication List   Post Discharge Medication Reconciliation Status: discharge medications reconciled, continue medications without change   Current Outpatient Medications on File Prior to Visit   Medication Sig Dispense Refill   ? acetaminophen (TYLENOL) 325 MG tablet Take 2 tablets (650 mg total) by mouth 3 (three) times a day. (Patient taking differently: Take 650 mg by mouth 4 (four) times a day. )  0   ? ADVAIR DISKUS 500-50 mcg/dose DISKUS USE 1 INHALATION TWICE A DAY 60 each 11   ? albuterol (PROAIR HFA;PROVENTIL HFA;VENTOLIN HFA) 90 mcg/actuation inhaler Inhale 2 puffs every 4 (four) hours as needed for wheezing. 1 Inhaler 0   ? ascorbic acid, vitamin C, (VITAMIN C) 1000 MG tablet Take 1,000 mg by mouth daily. Airborne 1 tab daily     ? aspirin 81 MG EC tablet Take 81 mg by mouth daily.     ? azelastine (ASTELIN) 137 mcg (0.1 %) nasal spray 2 sprays into each nostril daily.     ? benzonatate (TESSALON) 200 MG capsule Take 1 capsule (200 mg total) by mouth 3 (three) times a day as needed for cough. 90 capsule 2   ? calcitonin, salmon, (MIACALCIN) 200 unit/actuation nasal spray Apply 1 spray into alternating nostrils daily for 14 days.  0    ? celecoxib (CELEBREX) 200 MG capsule Take 200 mg by mouth 2 (two) times a day.     ? chlorhexidine (PERIDEX) 0.12 % solution Apply 15 mL to the mouth or throat at bedtime as needed.      ? cholecalciferol, vitamin D3, 1,000 unit tablet Take 1,000 Units by mouth daily.     ? diclofenac sodium (VOLTAREN) 1 % Gel Apply 4 g to area of pain in low back 4 times daily  0   ? EPINEPHrine (EPIPEN/ADRENACLICK/AUVI-Q) 0.3 mg/0.3 mL injection Inject 0.3 mg into the shoulder, thigh, or buttocks.     ? famotidine (PEPCID) 20 MG tablet Take 20 mg by mouth daily.     ? fluticasone (FLONASE) 50 mcg/actuation nasal spray Apply 1 spray into each nostril as needed.      ? furosemide (LASIX) 40 MG tablet Take 1 tablet (40 mg total) by mouth 2 (two) times a day at 9am and 6pm.  0   ? gabapentin (NEURONTIN) 300 MG capsule Take 2 capsules (600 mg total) by mouth 2 (two) times a day. 360 capsule 2   ? HYDROcodone-acetaminophen 5-325 mg per tablet Take 1 tablet by mouth every 4 (four) hours as needed. 90 tablet 0   ? ipratropium (ATROVENT HFA) 17 mcg/actuation inhaler Inhale 2 puffs every 6 (six) hours. 3 Inhaler 3   ? ipratropium-albuterol (DUO-NEB) 0.5-2.5 mg/3 mL nebulizer 1 neb 4 times a day until seen by primary and then as needed or as directed 60 vial 1   ? lidocaine (XYLOCAINE) 5 % ointment Apply to area of pain in low back 4 times daily 35.44 g 0   ? losartan (COZAAR) 50 MG tablet TAKE 1 TABLET DAILY 90 tablet 3   ? melatonin 3 mg Tab tablet Take 1 tablet (3 mg total) by mouth at bedtime.  0   ? methocarbamoL (ROBAXIN) 750 MG tablet Take 1 tablet (750 mg total) by mouth every 6 (six) hours for 14 days.  0   ? metoprolol succinate (TOPROL-XL) 50 MG 24 hr tablet Take 1.5 tablets (75 mg total) by mouth daily. 135 tablet 3   ? mirabegron (MYRBETRIQ) 50 mg Tb24 ER tablet Take 1 tablet (50 mg total) by mouth daily. 90 tablet 3   ? omalizumab (XOLAIR) 150 mg/mL Syrg injection Inject 300 mg under the skin every 14 (fourteen) days.      ?  pantoprazole (PROTONIX) 40 MG tablet Take 1 tablet (40 mg total) by mouth daily. 90 tablet 4   ? polyethylene glycol (MIRALAX) 17 gram packet Take 1 packet (17 g total) by mouth daily as needed.  0   ? QUEtiapine (SEROQUEL) 25 MG tablet Take 0.5 tablets (12.5 mg total) by mouth 3 (three) times a day as needed (Agitation that is not redirectable.).  0   ? rivaroxaban ANTICOAGULANT (XARELTO) 20 mg tablet Take 20 mg by mouth at bedtime.     ? senna-docusate (PERICOLACE) 8.6-50 mg tablet Take 1 tablet by mouth daily as needed for constipation.  0   ? simvastatin (ZOCOR) 40 MG tablet Take 1 tablet (40 mg total) by mouth at bedtime. 90 tablet 3   ? sodium chloride (OCEAN) 0.65 % nasal spray 2 sprays into each nostril as needed.     ? solifenacin (VESICARE) 5 MG tablet Take 1 tablet (5 mg total) by mouth at bedtime. 90 tablet 3   ? tamoxifen (NOLVADEX) 20 MG tablet TAKE 1 TABLET DAILY 90 tablet 4   ? traZODone (DESYREL) 50 MG tablet TAKE 1 TABLET(50 MG) BY MOUTH AT BEDTIME AS NEEDED 90 tablet 2   ? triamcinolone (KENALOG) 0.1 % cream Apply to rash on arms twice daily for one week 30 g 0   ? triamcinolone (KENALOG) 0.5 % ointment Apply o foot twice a day 30 g 1   ? venlafaxine (EFFEXOR-XR) 150 MG 24 hr capsule TAKE 1 CAPSULE DAILY 90 capsule 4     Current Facility-Administered Medications on File Prior to Visit   Medication Dose Route Frequency Provider Last Rate Last Dose   ? denosumab 60 mg (PROLIA 60 mg/ml)  60 mg Subcutaneous Q6 Months Suzanna Shane MD   60 mg at 02/04/20 1352       Allergies     Allergies   Allergen Reactions   ? Sulfa (Sulfonamide Antibiotics) Rash     Headaches and dizziness.   ? Homeopathic Drugs Unknown and Other (See Comments)     Comment: runny nose, Comment: runny nose   ? Mold Extracts    ? Morphine (Pf)      hallucinate   ? Lisinopril Cough, Unknown and Itching     Comment: cough, Comment: cough   ? Sulfacetamide Sodium Rash       Review of Systems   A comprehensive review of 14 systems was  done. Pertinent findings noted here and in history of present illness. All the rest negative.  Constitutional: Negative.  Negative for fever, chills, she has activity change, appetite change and fatigue.   HENT: Negative for congestion and facial swelling.    Eyes: Negative for photophobia, redness and visual disturbance.   Respiratory: Negative for cough and chest tightness.    Cardiovascular: Negative for chest pain, palpitations and leg swelling.   Gastrointestinal: Negative for nausea, diarrhea, constipation, blood in stool and abdominal distention.   Genitourinary: Negative.    Musculoskeletal: Negative.  Pain in her low back; today reporting intractable muscle spasms which are not improving  Has piriformis pain  Skin: Negative.    Neurological: Negative for dizziness, tremors, syncope, weakness, light-headedness and headaches.   Hematological: Does not bruise/bleed easily.   Psychiatric/Behavioral: Negative.  Significant distress due to pain      Physical Exam     Recent Vitals 10/24/2020   Height -   Weight -   BSA (m2) -   /88   Pulse 85   Temp 97.5   Temp src 1   SpO2 92   Some recent data might be hidden     Currently on 3 L of oxygen by nasal cannula wt 150lb  Constitutional: Oriented to person, place, and time and appears well-developed.   HEENT:  Normocephalic and atraumatic.  Eyes: Conjunctivae and EOM are normal. Pupils are equal, round, and reactive to light. No discharge.  No scleral icterus. Nose normal. Mouth/Throat: Oropharynx is clear and moist. No oropharyngeal exudate.    NECK: Normal range of motion. Neck supple. No JVD present. No tracheal deviation present. No thyromegaly present.   CARDIOVASCULAR: Normal rate, regular rhythm and intact distal pulses.  Exam reveals no gallop and no friction rub.  Systolic murmur present.  PULMONARY: Effort normal and breath sounds normal. No respiratory distress.No Wheezing or rales.  Course breath sounds heard bilaterally  Wet cough noted  ABDOMEN:  Soft. Bowel sounds are normal. No distension and no mass.  There is no tenderness. There is no rebound and no guarding. No HSM.  MUSCULOSKELETAL: Normal range of motion. No edema and no tenderness. Mild kyphosis,  tenderness  to palpation on her lower thoracic spine  Mobility is limited.  LYMPH NODES: Has no cervical, supraclavicular, axillary and groin adenopathy.   NEUROLOGICAL: Alert and oriented to person, place, and time. No cranial nerve deficit.  Normal muscle tone. Coordination normal.   GENITOURINARY: Deferred exam.  SKIN: Skin is warm and dry. No rash noted. No erythema. No pallor.   EXTREMITIES: No cyanosis, no clubbing, no edema. No Deformity.  PSYCHIATRIC: Normal mood, affect and behavior.  High anxiety due to pain      Lab Results     Recent Results (from the past 240 hour(s))   Basic Metabolic Panel    Collection Time: 10/26/20  9:55 AM   Result Value Ref Range    Sodium 141 136 - 145 mmol/L    Potassium 4.7 3.5 - 5.0 mmol/L    Chloride 106 98 - 107 mmol/L    CO2 14 (L) 22 - 31 mmol/L    Anion Gap, Calculation 21 (H) 5 - 18 mmol/L    Glucose 91 70 - 125 mg/dL    Calcium 10.0 8.5 - 10.5 mg/dL    BUN 24 8 - 28 mg/dL    Creatinine 1.41 (H) 0.60 - 1.10 mg/dL    GFR MDRD Af Amer 44 (L) >60 mL/min/1.73m2    GFR MDRD Non Af Amer 36 (L) >60 mL/min/1.73m2   Calcium    Collection Time: 10/26/20  9:55 AM   Result Value Ref Range    Calcium 10.0 8.5 - 10.5 mg/dL   Magnesium    Collection Time: 10/26/20  9:55 AM   Result Value Ref Range    Magnesium 2.1 1.8 - 2.6 mg/dL   Phosphorus    Collection Time: 10/26/20  9:55 AM   Result Value Ref Range    Phosphorus 4.2 2.5 - 4.5 mg/dL   HM2(CBC w/o Differential)    Collection Time: 10/26/20  9:55 AM   Result Value Ref Range    WBC 10.8 4.0 - 11.0 thou/uL    RBC 4.90 3.80 - 5.40 mill/uL    Hemoglobin 14.5 12.0 - 16.0 g/dL    Hematocrit 45.7 35.0 - 47.0 %    MCV 93 80 - 100 fL    MCH 29.6 27.0 - 34.0 pg    MCHC 31.7 (L) 32.0 - 36.0 g/dL    RDW 13.8 11.0 - 14.5 %     Platelets 185 140 - 440 thou/uL    MPV 10.2 8.5 - 12.5 fL   COVID-19 Virus PCR MRF    Collection Time: 10/27/20  8:00 AM    Specimen: Respiratory   Result Value Ref Range    COVID-19 VIRUS SPECIMEN SOURCE Nasopharyngeal     2019-nCOV Not Detected    COVID-19 Virus PCR MRF    Collection Time: 11/03/20  8:00 AM    Specimen: Nasopharyngeal Swab; Respiratory   Result Value Ref Range    COVID-19 VIRUS SPECIMEN SOURCE Nasopharyngeal     2019-nCOV Not Detected    Basic Metabolic Panel    Collection Time: 11/04/20  7:02 AM   Result Value Ref Range    Sodium 140 136 - 145 mmol/L    Potassium 4.0 3.5 - 5.0 mmol/L    Chloride 105 98 - 107 mmol/L    CO2 26 22 - 31 mmol/L    Anion Gap, Calculation 9 5 - 18 mmol/L    Glucose 77 70 - 125 mg/dL    Calcium 9.1 8.5 - 10.5 mg/dL    BUN 34 (H) 8 - 28 mg/dL    Creatinine 1.46 (H) 0.60 - 1.10 mg/dL    GFR MDRD Af Amer 42 (L) >60 mL/min/1.73m2    GFR MDRD Non Af Amer 35 (L) >60 mL/min/1.73m2                  VASHTI Sanchez

## 2021-06-21 NOTE — LETTER
Letter by Paola Rose MBBS at      Author: Paola Rose MBBS Service: -- Author Type: --    Filed:  Encounter Date: 4/8/2021 Status: (Other)         [facility]  [address] [facility] [id number]                                  April 8, 2021    Patient: Irene León   MR Number: [id number]   YOB: 1945   Date of Visit: 4/8/2021     Dear Dr. Pérez:    Thank you for referring Irene León to me for evaluation. Below are the relevant portions of my assessment and plan of care.    If you have questions, please do not hesitate to call me. I look forward to following Irene along with you.    Sincerely,        VASHTI Sanchez          CC  No Recipients  Paola Rose MBBS  4/8/2021  4:18 PM  [facility] note      Patient: Irene León  MRN: [id number]      [facility] [[id number]]  Reason for Visit     Chief Complaint   Patient presents with   ? Review Of Multiple Medical Conditions   Follow-up HYPOTENSION AND PAIN    Code Status     Full code    Assessment     ACUTE HYPOTENSIVE EPISODE WITH LOW BP 90/54 NOTED   WORSENING RESP DISTRESS WITH ABNORMAL LUNG EXAM  Severe sepsis with pyelonephritis  E. coli bacteremia  Possible pneumonia currently improved after antibiotic usage  COPD with chronic hypoxic respiratory failure on 2 L of oxygen at baseline  Chronic congestive heart failure with reduced ejection fraction with acute exacerbation  ANATOLIY  Electrolyte abnormalities with low potassium and magnesium  Atrial fibrillation with rapid ventricular response noted in the hospital  History of depression anxiety.  Insomnia.  History of falls.  Hypotension  Acute encephalopathy secondary to sepsis  Generalized weakness  Recurrent hospitalizations- 3 hospital admission noted in last  6 weeks including 1 ICU admission  FTT    Plan     Patient has been discharged to the TCU.  Patient seen today urgently because of request of nursing due to multiple  concerns including severe hypertension as well as worsening respiratory distress with abnormal lung exam  Patient was noted to have low blood pressures.  Nursing was concerned about switching medications.  However unfortunately patient does not have much insight into her condition  Medication review done and she is on very high doses of metoprolol.  Hold parameters have been given to staff.  Orthostatic blood pressures to be checked.  Also RADHA hoses for bilateral lower extremities ordered  R/C BMP shows an improvement with a creatinine of 1.3 down from 1.5  HG 9.8  Stable labs noted.  She has an abnormal lung exam today with crackles heard in the left lung base.  Incentive spirometry 10 puffs 4 times daily.  Check chest x-ray to rule out any aspirational event.  Speech evaluation.  Albuterol nebs 4 times daily to be scheduled.  She has a TLSO on most of the time day and wondering if that could be also contributing to her respiratory issues.  At baseline she has hypoxic respiratory failure and continues with oxygen.  We will follow up on her x-ray  Staff encouraged to talk to patient about improving her pulmonary hygiene  She was evaluated for CHF exacerbation concerns but her weights are actually down 10 pounds with improvement in lymphedema  MINIMIZE NARCOTICS    History     Patient is a very pleasant 76 y.o. female who is admitted to TCU  Patient presented to the hospital with increasing weakness and altered mental status.  She has underlying history of COPD.  sHe is on oxygen.  She continues to be short of breath  Today reported to have an abnormal lung exam with crackles heard in the left lung base  Also noted to be profoundly hypertensive due to with staff is quite concerned about pushing her meds and diuretics.  Eventually ended up holding her diuretics because of this concern  Unfortunately she ultimately fell on the floor and could not get up and was brought into the hospital  She was admitted with severe  sepsis with pyelonephritis and E. coli bacteremia  She was also suspected to have acute pneumonia and these were treated with antibiotics given IV with improvement.  Reports no fever no congestion however does have intermittent cough but reports that she just cannot bring the phlegm up  She also was noted to have acute exacerbation of her congestive heart failure.  Echocardiogram shows an EF of 45%.  She was given IV Lasix for diuresis and has been discharged on high doses of oral Lasix.  Her Toprol was also increased.  She developed acute diarrhea felt to be secondary to antibiotics they have recommended continuing Imodium.  A FIB was stable and xarelto was continued. She had some episodes of A. fib with rapid ventricular response in the hospital.  Dosage has been reduced prior to discharge  Heart rates have been stable    Past Medical History     Active Ambulatory (Non-Hospital) Problems    Diagnosis   ? ACP (advance care planning)   ? Exacerbation of intermittent asthma, unspecified asthma severity   ? Acute cystitis without hematuria   ? Factor V Leiden mutation (H)   ? Chronic anticoagulation   ? Essential hypertension, benign   ? Acute systolic congestive heart failure (H)   ? Pain due to fracture   ? High risk medication use   ? Heart failure exacerbated by sotalol (H)   ? T12 compression fracture (H)   ? Acute on chronic diastolic congestive heart failure (H)   ? Metabolic encephalopathy   ? Low back pain   ? Compression fracture of L1 vertebra, sequela   ? Chronic respiratory failure with hypoxia (H)   ? Chronic obstructive pulmonary disease, unspecified COPD type (H)   ? Chronic systolic congestive heart failure (H)   ? Hypoxia   ? S/P hip replacement, left   ? Hip pain, left   ? Acute hypoxemic respiratory failure (H)   ? Chest pain   ? COPD exacerbation (H)   ? Herpes zoster without complication   ? Chest wall pain   ? Benign essential hypertension   ? Dyslipidemia   ? Acute respiratory failure with  hypoxia (H)   ? Pulsatile tinnitus of both ears   ? Hot flashes due to tamoxifen   ? Pleural nodules   ? Hot flashes, menopausal   ? Malignant neoplasm of central portion of left female breast (H)   ? CKD (chronic kidney disease) stage 3, GFR 30-59 ml/min   ? Dyspnea   ? Insomnia   ? Leg pain, left   ? Femur fracture (H)   ? GERD (gastroesophageal reflux disease)   ? Post menopausal syndrome   ? OP (osteoporosis)   ? Hypertension   ? Pulmonary emphysema, unspecified emphysema type (H)   ? Hyperlipidemia   ? Centrilobular emphysema (H)   ? Anisocoria   ? OAB (overactive bladder)   ? Factor 5 Leiden mutation, heterozygous (H)   ? Family history of blood disease   ? Bladder incontinence   ? Physical deconditioning   ? Cardiomyopathy, idiopathic (H)   ? Depression with anxiety   ? Osteoarthritis of hip   ? Herniated intervertebral disc     Past Medical History:   Diagnosis Date   ? ANATOLIY (acute kidney injury) (H)    ? Allergic rhinitis    ? Arrhythmia    ? Atrial fibrillation with RVR (H)    ? Bacteremia    ? Breast cancer (H) 2017   ? Cardiomyopathy (H)    ? Centrilobular emphysema (H)    ? CHF (congestive heart failure) (H)    ? CKD (chronic kidney disease)    ? COPD (chronic obstructive pulmonary disease) (H)    ? Coronary artery disease    ? Depression    ? Dysphagia    ? E. coli sepsis (H)    ? Factor 5 Leiden mutation, heterozygous (H)    ? GERD (gastroesophageal reflux disease)    ? Hx of radiation therapy 2017   ? Hyperlipidemia    ? Hypertension    ? Hypokalemia    ? Hypomagnesemia    ? Idiopathic cardiomyopathy (H)    ? OAB (overactive bladder)    ? Osteoporosis    ? Sacral insufficiency fracture    ? Sinusitis    ? Syncope    ? Urge incontinence    ? Vocal cord dysfunction        Past Social History     Reviewed, and she  reports that she quit smoking about 13 years ago. She quit smokeless tobacco use about 16 years ago. She reports that she does not drink alcohol or use drugs.    Family History     Reviewed,  and family history includes Breast cancer in her maternal aunt; Osteoporosis in an other family member; Prostate cancer in her brother and maternal uncle.    Medication List   Post Discharge Medication Reconciliation Status: discharge medications reconciled, continue medications without change   Current Outpatient Medications on File Prior to Visit   Medication Sig Dispense Refill   ? acetaminophen (TYLENOL) 500 MG tablet Take 1,000 mg by mouth 3 (three) times a day.     ? albuterol (PROAIR HFA;PROVENTIL HFA;VENTOLIN HFA) 90 mcg/actuation inhaler Inhale 2 puffs every 4 (four) hours as needed for wheezing. 1 Inhaler 0   ? ascorbic acid, vitamin C, (VITAMIN C) 1000 MG tablet Take 1,000 mg by mouth daily. Airborne 1 tab daily     ? azelastine (ASTELIN) 137 mcg (0.1 %) nasal spray 2 sprays into each nostril daily.     ? azithromycin (ZITHROMAX) 250 MG tablet Take 1 tablet (250 mg total) by mouth at bedtime. 1 tablet 0   ? benzonatate (TESSALON) 200 MG capsule Take 1 capsule (200 mg total) by mouth 3 (three) times a day as needed for cough. 90 capsule 0   ? chlorhexidine (PERIDEX) 0.12 % solution Apply 15 mL to the mouth or throat at bedtime as needed.      ? cholecalciferol, vitamin D3, 1,000 unit tablet Take 1,000 Units by mouth daily.     ? diclofenac sodium (VOLTAREN) 1 % Gel Apply 1 g topically 3 (three) times a day.      ? EPINEPHrine (EPIPEN/ADRENACLICK/AUVI-Q) 0.3 mg/0.3 mL injection Inject 0.3 mg into the shoulder, thigh, or buttocks.     ? fluticasone (FLONASE) 50 mcg/actuation nasal spray Apply 1 spray into each nostril 2 (two) times a day as needed for allergies.      ? fluticasone-umeclidinium-vilanterol (TRELEGY ELLIPTA) 100-62.5-25 mcg DsDv inhaler Inhale 1 Inhalation daily. 1 each 3   ? furosemide (LASIX) 40 MG tablet Take 1 tablet (40 mg total) by mouth 2 (two) times a day. (Patient taking differently: Take 80 mg by mouth every morning. and 40mg at 2pm) 180 tablet 1   ? gabapentin (NEURONTIN) 300 MG  capsule Take 2 capsules (600 mg total) by mouth 2 (two) times a day. 360 capsule 2   ? hydrOXYzine pamoate (VISTARIL) 25 MG capsule Take 25 mg by mouth 3 (three) times a day.      ? ipratropium (ATROVENT HFA) 17 mcg/actuation inhaler Inhale 2 puffs every 6 (six) hours. 3 Inhaler 3   ? loperamide (IMODIUM) 2 mg capsule Take 4 mg by mouth as needed for diarrhea (Every 3 hours as needed for Diarrhea).     ? melatonin 3 mg Tab tablet Take 1 tablet (3 mg total) by mouth at bedtime.  0   ? metoprolol succinate (TOPROL-XL) 50 MG 24 hr tablet Take 100 mg by mouth at bedtime.     ? MYRBETRIQ 50 mg Tb24 ER tablet TAKE 1 TABLET DAILY 90 tablet 3   ? pantoprazole (PROTONIX) 40 MG tablet Take 1 tablet (40 mg total) by mouth daily. 90 tablet 4   ? polyethylene glycol (MIRALAX) 17 gram packet Take 1 packet (17 g total) by mouth daily as needed.  0   ? rivaroxaban ANTICOAGULANT (XARELTO) 15 mg tablet Take 15 mg by mouth daily with supper.     ? simvastatin (ZOCOR) 40 MG tablet Take 1 tablet (40 mg total) by mouth at bedtime. 90 tablet 3   ? sodium chloride (OCEAN) 0.65 % nasal spray 2 sprays into each nostril every 2 (two) hours as needed for congestion.      ? solifenacin (VESICARE) 5 MG tablet Take 1 tablet (5 mg total) by mouth at bedtime. 90 tablet 3   ? tamoxifen (NOLVADEX) 20 MG tablet TAKE 1 TABLET DAILY 90 tablet 3   ? traMADoL (ULTRAM) 50 mg tablet Take 1 tablet (50 mg total) by mouth every 6 (six) hours as needed for pain or severe pain (7-10). 30 tablet 0   ? venlafaxine (EFFEXOR-XR) 150 MG 24 hr capsule TAKE 1 CAPSULE DAILY 90 capsule 3     No current facility-administered medications on file prior to visit.        Allergies     Allergies   Allergen Reactions   ? Sulfa (Sulfonamide Antibiotics) Rash     Headaches and dizziness.   ? Homeopathic Drugs Unknown and Other (See Comments)     Comment: runny nose, Comment: runny nose   ? Mold Extracts    ? Morphine (Pf)      hallucinate   ? Lisinopril Cough, Unknown and Itching  "    Comment: cough, Comment: cough   ? Sulfacetamide Sodium Rash       Review of Systems   A comprehensive review of 14 systems was done. Pertinent findings noted here and in history of present illness. All the rest negative.  Constitutional: Negative.  Negative for fever, chills, she has  activity change, appetite change and fatigue.   HENT: Negative for congestion and facial swelling.    Eyes: Negative for photophobia, redness and visual disturbance.   Respiratory: Negative for cough and chest tightness.    Cardiovascular: Negative for chest pain, palpitations and has  leg swelling.   Gastrointestinal: Negative for nausea, diarrhea, constipation, blood in stool and abdominal distention.   Genitourinary: Negative.    Musculoskeletal: Negative.  Reports limited endurance  Skin: Negative.    Neurological: Negative for dizziness, tremors, syncope, weakness, light-headedness and headaches.   Hematological: Does not bruise/bleed easily.   Psychiatric/Behavioral: Negative.        Physical Exam     Recent Vitals 4/7/2021   Height 5' 0\"   Weight 134 lbs 10 oz   BSA (m2) 1.61 m2   /86   Pulse 68   Temp 98.2   Temp src -   SpO2 92   Some recent data might be hidden   on 2l  R/C BP 90/54    Constitutional: Oriented to person, place, and time and appears well-developed.   HEENT:  Normocephalic and atraumatic.  Eyes: Conjunctivae and EOM are normal. Pupils are equal, round, and reactive to light. No discharge.  No scleral icterus. Nose normal. Mouth/Throat: Oropharynx is clear and moist. No oropharyngeal exudate.    NECK: Normal range of motion. Neck supple. No JVD present. No tracheal deviation present. No thyromegaly present.   CARDIOVASCULAR: Normal rate, regular rhythm and intact distal pulses.  Exam reveals no gallop and no friction rub.  Systolic murmur present.  PULMONARY: Effort normal and breath sounds normal. No respiratory distress.No Wheezing or rales.  faint rales heard TAWNY WORSE ON LEFT LUNG BASE  ABDOMEN: " Soft. Bowel sounds are normal. No distension and no mass.  There is no tenderness. There is no rebound and no guarding. No HSM.  MUSCULOSKELETAL: Normal range of motion. 2+ LE edema and no tenderness. Mild kyphosis, HAS RIB tenderness.  LYMPH NODES: Has no cervical, supraclavicular, axillary and groin adenopathy.   NEUROLOGICAL: Alert and oriented to person, place, and time. No cranial nerve deficit.  Normal muscle tone. Coordination normal.   GENITOURINARY: Deferred exam.  SKIN: Skin is warm and dry. No rash noted. No erythema. No pallor.   EXTREMITIES: No cyanosis, no clubbing, 2+le edema. No Deformity.  PSYCHIATRIC: Normal mood, affect and behavior.      Lab Results     Recent Results (from the past 240 hour(s))   COVID-19 VIRUS (CORONAVIRUS) BY PCR - EXTERNAL RESULT    Collection Time: 03/30/21 10:27 AM    Specimen: Nasopharyngeal Swab    Specimen type and source: Swab (Source Required), Nasopharyngeal swab   Result Value Ref Range    COVID-19 Virus by PCR (External Result) Not Detected Not Detected   Basic Metabolic Panel    Collection Time: 04/01/21  7:32 AM   Result Value Ref Range    Sodium 135 (L) 136 - 145 mmol/L    Potassium 4.2 3.5 - 5.0 mmol/L    Chloride 100 98 - 107 mmol/L    CO2 25 22 - 31 mmol/L    Anion Gap, Calculation 10 5 - 18 mmol/L    Glucose 83 70 - 125 mg/dL    Calcium 8.2 (L) 8.5 - 10.5 mg/dL    BUN 18 8 - 28 mg/dL    Creatinine 1.54 (H) 0.60 - 1.10 mg/dL    GFR MDRD Af Amer 40 (L) >60 mL/min/1.73m2    GFR MDRD Non Af Amer 33 (L) >60 mL/min/1.73m2   HM2(CBC w/o Differential)    Collection Time: 04/01/21  7:32 AM   Result Value Ref Range    WBC 11.1 (H) 4.0 - 11.0 thou/uL    RBC 3.59 (L) 3.80 - 5.40 mill/uL    Hemoglobin 10.1 (L) 12.0 - 16.0 g/dL    Hematocrit 31.5 (L) 35.0 - 47.0 %    MCV 88 80 - 100 fL    MCH 28.1 27.0 - 34.0 pg    MCHC 32.1 32.0 - 36.0 g/dL    RDW 14.7 (H) 11.0 - 14.5 %    Platelets 358 140 - 440 thou/uL    MPV 9.7 8.5 - 12.5 fL   Magnesium    Collection Time: 04/01/21   7:32 AM   Result Value Ref Range    Magnesium 1.9 1.8 - 2.6 mg/dL   Basic Metabolic Panel    Collection Time: 04/05/21  6:37 AM   Result Value Ref Range    Sodium 141 136 - 145 mmol/L    Potassium 3.9 3.5 - 5.0 mmol/L    Chloride 104 98 - 107 mmol/L    CO2 27 22 - 31 mmol/L    Anion Gap, Calculation 10 5 - 18 mmol/L    Glucose 77 70 - 125 mg/dL    Calcium 8.5 8.5 - 10.5 mg/dL    BUN 17 8 - 28 mg/dL    Creatinine 1.32 (H) 0.60 - 1.10 mg/dL    GFR MDRD Af Amer 47 (L) >60 mL/min/1.73m2    GFR MDRD Non Af Amer 39 (L) >60 mL/min/1.73m2   HM2(CBC w/o Differential)    Collection Time: 04/05/21  6:37 AM   Result Value Ref Range    WBC 9.5 4.0 - 11.0 thou/uL    RBC 3.49 (L) 3.80 - 5.40 mill/uL    Hemoglobin 9.8 (L) 12.0 - 16.0 g/dL    Hematocrit 31.2 (L) 35.0 - 47.0 %    MCV 89 80 - 100 fL    MCH 28.1 27.0 - 34.0 pg    MCHC 31.4 (L) 32.0 - 36.0 g/dL    RDW 15.0 (H) 11.0 - 14.5 %    Platelets 365 140 - 440 thou/uL    MPV 9.4 8.5 - 12.5 fL              Electronically signed by  VASHTI Sanchez

## 2021-06-21 NOTE — PROGRESS NOTES
1550:  BP rechecked 192/98 left arm.  Patient asked that I check BP in her R arm.  BP checked: 168/98 right arm.  Patient states it's normal that her BP is lower in her R arm, which is why they always check her left. Dr. Suarez notified of BP.  Dr. Suarez would like patient to have BP rechecked on Thurs or Fri with PCP.  Patient verbalized she would./BLANK Nogueira RN

## 2021-06-21 NOTE — LETTER
"Letter by Monet Byrd CNP at      Author: Monet Byrd CNP Service: -- Author Type: --    Filed:  Encounter Date: 2021 Status: (Other)         Patient: Irene León   MR Number: [id number]   YOB: 1945   Date of Visit: 2021       [facility]      NAME:  Irene León             :  1945    MRN: [id number]    CODE STATUS:  FULL CODE    FACILITY: [facility] [[id number]]         CHIEF COMPLAIN/REASON FOR VISIT:  Chief Complaint   Patient presents with   ? Problem Visit     copd       HISTORY OF PRESENT ILLNESS: Irene León is a 76 y.o. female being seen at the request of the nurses request to review CXR. Had increased shortness of breath yesterday and had xray with some nebs ordered. Nebs not initiated as facility specific covid restrictions. Reviewed with NM who will follow-up with DON to see if nebs can start. CXR w/o pneumonia or CHF, but says does not ro COPD, pt with PMH of this. At [facility] from 3/23 to 3/30/2021 and per her EMR \"with PMH of COPD (on 2L at home) with two recent admissions at [facility], CHF (EF 40%), factor 5 Leiden on anticoagulation, CKD stage 3, breast cancer, depression and anxiety who presented to the ED 3/23 due to acute weakness and encephalopathy and found to have E. Coli pyelonephritis and bacteremia with severe sepsis:     Severe sepsis  Pyelonephritis   E. Coli bacteremia  Possible pneumonia  - improved with ceftriaxone; finished 7 day course     Chronic HFrEF with acute exacerbation.  - Echocardiogram this admit shows EF 45% w/elevated right atrial/ventricular pressures, near baseline per care everywhere (had similar EF on ECHO at outside hospital recently as well).  - on admission was hypovolemic and septic, but became hypervolemic following fluid resuscitation for sepsis  - Has diuresed well on IV lasix  - transition to orals 80/40 for discharge, which may need to be adjusted. Patient was on 80 daily at home, " but per daughter this was not controlling her peripheral edema nor cough from pulmonary edema (daughter is RN) although patient wasn't that compliant.  - Toprol increased from 74 yo 100mg daily  - ARB on hold due to lower pressures (may need to resume after discharge) Was on Losartan 50mg PTA     Acute kidney injury likely 2/2 sepsis  -resolved     Hypomag: stable today  Hypokalemia: repleted     Diarrhea: new, started a few days into hospitalization. C. Diff not sent per hospital protocol as no fever/abdominal pain and leukocytosis is improving; Most likely 2/2 antibiotics.Continue imodium; recommend c. Diff testing if diarrhea does not improve off of antibiotics.      Acute on Chronic hypoxemic respiratory failure due to COPD (on 2L at baseline) with pulmonary emphysema moderate to severe:  Has recurrent COPD exacerbation requiring hospitalization, most recently 3/9-3/11. COPD seemed stable here in hospital. Continued on inhalers. Weaned down to 2L of oxygen at discharge      L rib pain and T12 compression fracture  Since a fall 6 weeks ago, was on tramadol + lidocaine patches. Endorses ongoing severe pain. Oral dilaudid and lidocaine patch attempted in hospital after tramadol wasn't helping. However, this didn't seem to help wither.    D/w pain MD, Dr. Lima to get additional ideas to control her pain; he suggested abdominal binder and short course celebrex. Consider robaxin if this doesn't help.     Essential Hypertension:  - under control with metoprolol and diuresis  - hold ARB; resume if BP increases     Hyperlipidemia   -  statin     Overactive bladder  - Holding pta Myrbetriq ER + solifenacin (resume on discharge)      Depression and anxiety with insomnia:  - pta 150 mg venlafaxine daily - resume; d/w pharmacy to start at 75 for a few days then increase back to home dose  - pta 3 mg melatonin HS, 50 mg trazodone HS prn - dc for prolonged QTc     It looks like patient has chronically prolonged QTc on  "review of records;     Atrial fibrillation: Patient was on 20mg Xarelto PTA. I see one note re: afib in her outpatient records; she did have several episodes of afib with RVR here in the hospital. She thinks she is in on Xarelto for TIA with factor V leidin, which is not an indication. Given h/o PAF will continue Xarelto at reduced dose of 15mg. She is currently in sinus. I will order a event monitor for her on discharge to see if when not septic she truly has afib. Given her falls risk, would recommend dc Xarelto if no PAF or clear indication.      Resolved issues this admission:    Hypotension 2/2 sepsis    Acute encephalopathy 2/2 sepsis    Acute metabolic acidosis with tachypnea for compensation and no pulmonary reserve     Demand ischemia     Code Status: discussed on 3/27 with Stephanie; she wants to remain full code; d/w Leelee that I'm concerned and wish that she would accept more discussions re: advance directive/goals of care. Recommended Leelee approach Stephanie's PCP on this matter or refer for palliative care consult if this is available to her as outpatient. \"  Reports shortness of breath stable,  02 use as  PER HOME. She is feeling more shortness of breath but lungs clear. We will try to get nebs initiated for her copd as ordered      Allergies   Allergen Reactions   ? Sulfa (Sulfonamide Antibiotics) Rash     Headaches and dizziness.   ? Homeopathic Drugs Unknown and Other (See Comments)     Comment: runny nose, Comment: runny nose   ? Mold Extracts    ? Morphine (Pf)      hallucinate   ? Lisinopril Cough, Unknown and Itching     Comment: cough, Comment: cough   ? Sulfacetamide Sodium Rash   :     Current Outpatient Medications   Medication Sig   ? acetaminophen (TYLENOL) 500 MG tablet Take 1,000 mg by mouth 3 (three) times a day.   ? albuterol (PROAIR HFA;PROVENTIL HFA;VENTOLIN HFA) 90 mcg/actuation inhaler Inhale 2 puffs every 4 (four) hours as needed for wheezing.   ? ascorbic acid, vitamin C, (VITAMIN C) " 1000 MG tablet Take 1,000 mg by mouth daily. Airborne 1 tab daily   ? azelastine (ASTELIN) 137 mcg (0.1 %) nasal spray 2 sprays into each nostril daily.   ? azithromycin (ZITHROMAX) 250 MG tablet Take 1 tablet (250 mg total) by mouth at bedtime.   ? benzonatate (TESSALON) 200 MG capsule Take 1 capsule (200 mg total) by mouth 3 (three) times a day as needed for cough.   ? chlorhexidine (PERIDEX) 0.12 % solution Apply 15 mL to the mouth or throat at bedtime as needed.    ? cholecalciferol, vitamin D3, 1,000 unit tablet Take 1,000 Units by mouth daily.   ? diclofenac sodium (VOLTAREN) 1 % Gel Apply 1 g topically 3 (three) times a day.    ? EPINEPHrine (EPIPEN/ADRENACLICK/AUVI-Q) 0.3 mg/0.3 mL injection Inject 0.3 mg into the shoulder, thigh, or buttocks.   ? fluticasone (FLONASE) 50 mcg/actuation nasal spray Apply 1 spray into each nostril 2 (two) times a day as needed for allergies.    ? fluticasone-umeclidinium-vilanterol (TRELEGY ELLIPTA) 100-62.5-25 mcg DsDv inhaler Inhale 1 Inhalation daily.   ? furosemide (LASIX) 40 MG tablet Take 1 tablet (40 mg total) by mouth 2 (two) times a day. (Patient taking differently: Take 80 mg by mouth every morning. and 40mg at 2pm)   ? gabapentin (NEURONTIN) 300 MG capsule Take 2 capsules (600 mg total) by mouth 2 (two) times a day.   ? hydrOXYzine pamoate (VISTARIL) 25 MG capsule Take 25 mg by mouth 3 (three) times a day.    ? ipratropium (ATROVENT HFA) 17 mcg/actuation inhaler Inhale 2 puffs every 6 (six) hours.   ? loperamide (IMODIUM) 2 mg capsule Take 4 mg by mouth as needed for diarrhea (Every 3 hours as needed for Diarrhea).   ? melatonin 3 mg Tab tablet Take 1 tablet (3 mg total) by mouth at bedtime.   ? metoprolol succinate (TOPROL-XL) 50 MG 24 hr tablet Take 100 mg by mouth at bedtime.   ? MYRBETRIQ 50 mg Tb24 ER tablet TAKE 1 TABLET DAILY   ? pantoprazole (PROTONIX) 40 MG tablet Take 1 tablet (40 mg total) by mouth daily.   ? polyethylene glycol (MIRALAX) 17 gram packet  Take 1 packet (17 g total) by mouth daily as needed.   ? rivaroxaban ANTICOAGULANT (XARELTO) 15 mg tablet Take 15 mg by mouth daily with supper.   ? simvastatin (ZOCOR) 40 MG tablet Take 1 tablet (40 mg total) by mouth at bedtime.   ? sodium chloride (OCEAN) 0.65 % nasal spray 2 sprays into each nostril every 2 (two) hours as needed for congestion.    ? solifenacin (VESICARE) 5 MG tablet Take 1 tablet (5 mg total) by mouth at bedtime.   ? tamoxifen (NOLVADEX) 20 MG tablet TAKE 1 TABLET DAILY   ? traMADoL (ULTRAM) 50 mg tablet Take 1 tablet (50 mg total) by mouth every 6 (six) hours as needed for pain or severe pain (7-10).   ? venlafaxine (EFFEXOR-XR) 150 MG 24 hr capsule TAKE 1 CAPSULE DAILY         REVIEW OF SYSTEMS:    Currently, no fever, chills, or rigors. Does not have any visual or hearing problems. Denies any chest pain, headaches, palpitations, lightheadedness, dizziness, NO CHANGE IN LEVEL OF  shortness of breath, or cough. Appetite is good. Denies any GERD symptoms. Denies any difficulty with swallowing, nausea, or vomiting.  Denies any abdominal pain, diarrhea or constipation. Denies any urinary symptoms. No insomnia. No active bleeding. No rash.       PHYSICAL EXAMINATION:  Vitals:    04/11/21 1347   BP: 145/85   Pulse: (!) 58   Temp: 98.3  F (36.8  C)   Weight: 132 lb 9.6 oz (60.1 kg)         GENERAL: Awake, Alert, oriented x3, not in any form of acute distress, answers questions appropriately, follows simple commands, conversant  HEENT: Head is normocephalic with normal hair distribution. No evidence of trauma. Ears: No acute purulent discharge. Eyes: Conjunctivae pink with no scleral jaundice. Nose: Normal mucosa and septum. NECK: Supple with no cervical or supraclavicular lymphadenopathy. Trachea is midline.  LUNGS: Clear to auscultation  ABDOMEN: Globular and soft, nontender to palpation, non distended, no masses, no organomegaly, good bowel sounds, no rebound or guarding, no peritoneal signs.    EXTREMITIES: Atraumatic. Full range of motion on both upper and lower extremities, there is no tenderness to palpation, +  pedal edema, no cyanosis or clubbing, no calf tenderness, normal cap refill, no joint swelling.  SKIN: Warm and dry, no erythema noted, no rashes or lesions.  Skin has some faded bruising noted possible old IV sites.  NEUROLOGICAL: The patient is oriented to person, place and time. Strength and sensation are grossly intact. Face is symmetric.        Social distancing practiced for assessment whenever possible as well as PPE utilized for visit today.            LABS:    Lab Results   Component Value Date    WBC 9.5 04/05/2021    HGB 9.8 (L) 04/05/2021    HCT 31.2 (L) 04/05/2021    MCV 89 04/05/2021     04/05/2021       Results for orders placed or performed in visit on 04/05/21   Basic Metabolic Panel   Result Value Ref Range    Sodium 141 136 - 145 mmol/L    Potassium 3.9 3.5 - 5.0 mmol/L    Chloride 104 98 - 107 mmol/L    CO2 27 22 - 31 mmol/L    Anion Gap, Calculation 10 5 - 18 mmol/L    Glucose 77 70 - 125 mg/dL    Calcium 8.5 8.5 - 10.5 mg/dL    BUN 17 8 - 28 mg/dL    Creatinine 1.32 (H) 0.60 - 1.10 mg/dL    GFR MDRD Af Amer 47 (L) >60 mL/min/1.73m2    GFR MDRD Non Af Amer 39 (L) >60 mL/min/1.73m2           No results found for: HGBA1C  Vitamin D, Total (25-Hydroxy)   Date Value Ref Range Status   10/15/2019 47.6 30.0 - 80.0 ng/mL Final     No results found for: [id number]    ASSESSMENT/PLAN:  1. COPD exacerbation (H)    2. Physical deconditioning      1. COPD/  Pulmonary emphysema: Is in a deconditioned state status post hospitalization on chronic oxygen  Will have PT and OT have services as well as skilled nurses to manage medical conditions and monitor oxygen saturations for oxygen.  Get approval for nebulizers vs MDI    2. Physical deconditioned: Continues with PT/OT with energy saving techniques, safety and strengthening, SN services for chronic medical conditons  managment            Electronically signed by:  Monet Byrd CNP  This progress note was completed using Dragon software and there may be grammatical errors.

## 2021-06-21 NOTE — LETTER
Letter by Pankaj Montanez MD at      Author: Pankaj Montanez MD Service: -- Author Type: --    Filed:  Encounter Date: 1/22/2021 Status: (Other)         Irene León  [address] [facility] [id number]             January 22, 2021         Dear Ms. León,    Below are the results from your recent visit:    Resulted Orders   Comprehensive Metabolic Panel   Result Value Ref Range    Sodium 140 136 - 145 mmol/L    Potassium 3.7 3.5 - 5.0 mmol/L    Chloride 103 98 - 107 mmol/L    CO2 25 22 - 31 mmol/L    Anion Gap, Calculation 12 5 - 18 mmol/L    Glucose 95 70 - 125 mg/dL    BUN 18 8 - 28 mg/dL    Creatinine 1.35 (H) 0.60 - 1.10 mg/dL    GFR MDRD Af Amer 46 (L) >60 mL/min/1.73m2    GFR MDRD Non Af Amer 38 (L) >60 mL/min/1.73m2    Bilirubin, Total 0.9 0.0 - 1.0 mg/dL    Calcium 8.3 (L) 8.5 - 10.5 mg/dL    Protein, Total 7.2 6.0 - 8.0 g/dL    Albumin 3.6 3.5 - 5.0 g/dL    Alkaline Phosphatase 63 45 - 120 U/L    AST 22 0 - 40 U/L    ALT 12 0 - 45 U/L    Narrative    Fasting Glucose reference range is 70-99 mg/dL per  American Diabetes Association (ADA) guidelines.   HM2(CBC w/o Differential)   Result Value Ref Range    WBC 8.0 4.0 - 11.0 thou/uL    RBC 5.42 (H) 3.80 - 5.40 mill/uL    Hemoglobin 16.1 (H) 12.0 - 16.0 g/dL    Hematocrit 50.5 (H) 35.0 - 47.0 %    MCV 93 80 - 100 fL    MCH 29.7 27.0 - 34.0 pg    MCHC 31.9 (L) 32.0 - 36.0 g/dL    RDW 12.7 11.0 - 14.5 %    Platelets 240 140 - 440 thou/uL    MPV 7.4 7.0 - 10.0 fL       Your creatinine level is improved from previous. Potassium level is normal.    Blood sugar is normal.    Liver tests are normal. Calcium level is okay.    Hemoglobin level is okay. Other blood counts normal.    No change in plan for procedure.    Please call with questions or contact us using [employer].    Sincerely,        Electronically signed by Pankaj Montanez MD

## 2021-06-21 NOTE — LETTER
"Letter by Monet Byrd CNP at      Author: Monet Byrd CNP Service: -- Author Type: --    Filed:  Encounter Date: 3/31/2021 Status: (Other)         Patient: Irene León   MR Number: [id number]   YOB: 1945   Date of Visit: 3/31/2021       [facility]      NAME:  Irene León             :  1945    MRN: [id number]    CODE STATUS:  FULL CODE    FACILITY: [facility] [[id number]]         CHIEF COMPLAIN/REASON FOR VISIT:  Chief Complaint   Patient presents with   ? Review Of Multiple Medical Conditions     new admit, pain managment       HISTORY OF PRESENT ILLNESS: Irene León is a 76 y.o. female being seen at the request of the nurses patient is a new admission from [facility] and her orders needed to be reviewed reconciled.  Also saw patient today for review of symptoms and to initiate care with medical care for seniors.  At [facility] from 3/23 to 3/30/2021 and per her EMR \"with PMH of COPD (on 2L at home) with two recent admissions at [facility], CHF (EF 40%), factor 5 Leiden on anticoagulation, CKD stage 3, breast cancer, depression and anxiety who presented to the ED 3/23 due to acute weakness and encephalopathy and found to have E. Coli pyelonephritis and bacteremia with severe sepsis:     Severe sepsis  Pyelonephritis   E. Coli bacteremia  Possible pneumonia  - improved with ceftriaxone; finished 7 day course     Chronic HFrEF with acute exacerbation.  - Echocardiogram this admit shows EF 45% w/elevated right atrial/ventricular pressures, near baseline per care everywhere (had similar EF on ECHO at outside hospital recently as well).  - on admission was hypovolemic and septic, but became hypervolemic following fluid resuscitation for sepsis  - Has diuresed well on IV lasix  - transition to orals 80/40 for discharge, which may need to be adjusted. Patient was on 80 daily at home, but per daughter this was not controlling her peripheral edema " nor cough from pulmonary edema (daughter is RN) although patient wasn't that compliant.  - Toprol increased from 74 yo 100mg daily  - ARB on hold due to lower pressures (may need to resume after discharge) Was on Losartan 50mg PTA     Acute kidney injury likely 2/2 sepsis  -resolved     Hypomag: stable today  Hypokalemia: repleted     Diarrhea: new, started a few days into hospitalization. C. Diff not sent per hospital protocol as no fever/abdominal pain and leukocytosis is improving; Most likely 2/2 antibiotics.Continue imodium; recommend c. Diff testing if diarrhea does not improve off of antibiotics.      Acute on Chronic hypoxemic respiratory failure due to COPD (on 2L at baseline) with pulmonary emphysema moderate to severe:  Has recurrent COPD exacerbation requiring hospitalization, most recently 3/9-3/11. COPD seemed stable here in hospital. Continued on inhalers. Weaned down to 2L of oxygen at discharge      L rib pain and T12 compression fracture  Since a fall 6 weeks ago, was on tramadol + lidocaine patches. Endorses ongoing severe pain. Oral dilaudid and lidocaine patch attempted in hospital after tramadol wasn't helping. However, this didn't seem to help wither.    D/w pain MD, Dr. Lima to get additional ideas to control her pain; he suggested abdominal binder and short course celebrex. Consider robaxin if this doesn't help.     Essential Hypertension:  - under control with metoprolol and diuresis  - hold ARB; resume if BP increases     Hyperlipidemia   -  statin     Overactive bladder  - Holding pta Myrbetriq ER + solifenacin (resume on discharge)      Depression and anxiety with insomnia:  - pta 150 mg venlafaxine daily - resume; d/w pharmacy to start at 75 for a few days then increase back to home dose  - pta 3 mg melatonin HS, 50 mg trazodone HS prn - dc for prolonged QTc     It looks like patient has chronically prolonged QTc on review of records;     Atrial fibrillation: Patient was on 20mg  "Xarelto PTA. I see one note re: afib in her outpatient records; she did have several episodes of afib with RVR here in the hospital. She thinks she is in on Xarelto for TIA with factor V leidin, which is not an indication. Given h/o PAF will continue Xarelto at reduced dose of 15mg. She is currently in sinus. I will order a event monitor for her on discharge to see if when not septic she truly has afib. Given her falls risk, would recommend dc Xarelto if no PAF or clear indication.      Resolved issues this admission:    Hypotension 2/2 sepsis    Acute encephalopathy 2/2 sepsis    Acute metabolic acidosis with tachypnea for compensation and no pulmonary reserve     Demand ischemia     Code Status: discussed on 3/27 with Stephanie; she wants to remain full code; d/w Leelee that I'm concerned and wish that she would accept more discussions re: advance directive/goals of care. Recommended Leelee approach Stephanie's PCP on this matter or refer for palliative care consult if this is available to her as outpatient. \"  Discussed her TCU stay including medical care for seniors role in her care, med management and pain relief, as well as TCU routines and ACP.  We have adjusted her pain meds and we ordered some Tubigrip's due to some edema trace to 1+2 bilateral extremities.  She does have oxygen at baseline and uses at home as well.      Allergies   Allergen Reactions   ? Sulfa (Sulfonamide Antibiotics) Rash     Headaches and dizziness.   ? Homeopathic Drugs Unknown and Other (See Comments)     Comment: runny nose, Comment: runny nose   ? Mold Extracts    ? Morphine (Pf)      hallucinate   ? Lisinopril Cough, Unknown and Itching     Comment: cough, Comment: cough   ? Sulfacetamide Sodium Rash   :     Current Outpatient Medications   Medication Sig   ? acetaminophen (TYLENOL) 500 MG tablet Take 1,000 mg by mouth 3 (three) times a day.   ? albuterol (PROAIR HFA;PROVENTIL HFA;VENTOLIN HFA) 90 mcg/actuation inhaler Inhale 2 puffs every 4 " (four) hours as needed for wheezing.   ? ascorbic acid, vitamin C, (VITAMIN C) 1000 MG tablet Take 1,000 mg by mouth daily. Airborne 1 tab daily   ? azelastine (ASTELIN) 137 mcg (0.1 %) nasal spray 2 sprays into each nostril daily.   ? azithromycin (ZITHROMAX) 250 MG tablet Take 1 tablet (250 mg total) by mouth at bedtime.   ? benzonatate (TESSALON) 200 MG capsule Take 1 capsule (200 mg total) by mouth 3 (three) times a day as needed for cough.   ? celecoxib (CELEBREX) 200 MG capsule Take 200 mg by mouth 2 (two) times a day.   ? chlorhexidine (PERIDEX) 0.12 % solution Apply 15 mL to the mouth or throat at bedtime as needed.    ? cholecalciferol, vitamin D3, 1,000 unit tablet Take 1,000 Units by mouth daily.   ? diclofenac sodium (VOLTAREN) 1 % Gel Apply 1 g topically 3 (three) times a day.    ? EPINEPHrine (EPIPEN/ADRENACLICK/AUVI-Q) 0.3 mg/0.3 mL injection Inject 0.3 mg into the shoulder, thigh, or buttocks.   ? fluticasone (FLONASE) 50 mcg/actuation nasal spray Apply 1 spray into each nostril 2 (two) times a day as needed for allergies.    ? fluticasone-umeclidinium-vilanterol (TRELEGY ELLIPTA) 100-62.5-25 mcg DsDv inhaler Inhale 1 Inhalation daily.   ? furosemide (LASIX) 40 MG tablet Take 1 tablet (40 mg total) by mouth 2 (two) times a day. (Patient taking differently: Take 80 mg by mouth every morning. and 40mg at 2pm)   ? gabapentin (NEURONTIN) 300 MG capsule Take 2 capsules (600 mg total) by mouth 2 (two) times a day.   ? hydrOXYzine pamoate (VISTARIL) 25 MG capsule Take 25 mg by mouth 3 (three) times a day.    ? ipratropium (ATROVENT HFA) 17 mcg/actuation inhaler Inhale 2 puffs every 6 (six) hours.   ? loperamide (IMODIUM) 2 mg capsule Take 4 mg by mouth as needed for diarrhea (Every 3 hours as needed for Diarrhea).   ? melatonin 3 mg Tab tablet Take 1 tablet (3 mg total) by mouth at bedtime.   ? metoprolol succinate (TOPROL-XL) 50 MG 24 hr tablet Take 100 mg by mouth at bedtime.   ? MYRBETRIQ 50 mg Tb24 ER  tablet TAKE 1 TABLET DAILY   ? pantoprazole (PROTONIX) 40 MG tablet Take 1 tablet (40 mg total) by mouth daily.   ? polyethylene glycol (MIRALAX) 17 gram packet Take 1 packet (17 g total) by mouth daily as needed.   ? rivaroxaban ANTICOAGULANT (XARELTO) 15 mg tablet Take 15 mg by mouth daily with supper.   ? simvastatin (ZOCOR) 40 MG tablet Take 1 tablet (40 mg total) by mouth at bedtime.   ? sodium chloride (OCEAN) 0.65 % nasal spray 2 sprays into each nostril every 2 (two) hours as needed for congestion.    ? solifenacin (VESICARE) 5 MG tablet Take 1 tablet (5 mg total) by mouth at bedtime.   ? tamoxifen (NOLVADEX) 20 MG tablet TAKE 1 TABLET DAILY   ? traMADoL (ULTRAM) 50 mg tablet Take 1 tablet (50 mg total) by mouth every 6 (six) hours as needed for pain or severe pain (7-10).   ? venlafaxine (EFFEXOR-XR) 150 MG 24 hr capsule TAKE 1 CAPSULE DAILY         REVIEW OF SYSTEMS:    Currently, no fever, chills, or rigors. Does not have any visual or hearing problems. Denies any chest pain, headaches, palpitations, lightheadedness, dizziness, NO CHANGE IN LEVEL OF  shortness of breath, or cough. Appetite is good. Denies any GERD symptoms. Denies any difficulty with swallowing, nausea, or vomiting.  Denies any abdominal pain, diarrhea or constipation. Denies any urinary symptoms. No insomnia. No active bleeding. No rash.       PHYSICAL EXAMINATION:  Vitals:    03/31/21 1636   BP: 137/62   Pulse: 82   Temp: 98.1  F (36.7  C)   Weight: 142 lb 12.8 oz (64.8 kg)         GENERAL: Awake, Alert, oriented x3, not in any form of acute distress, answers questions appropriately, follows simple commands, conversant  HEENT: Head is normocephalic with normal hair distribution. No evidence of trauma. Ears: No acute purulent discharge. Eyes: Conjunctivae pink with no scleral jaundice. Nose: Normal mucosa and septum. NECK: Supple with no cervical or supraclavicular lymphadenopathy. Trachea is midline.   ABDOMEN: Globular and soft,  nontender to palpation, non distended, no masses, no organomegaly, good bowel sounds, no rebound or guarding, no peritoneal signs.   EXTREMITIES: Atraumatic. Full range of motion on both upper and lower extremities, there is no tenderness to palpation, +  pedal edema, no cyanosis or clubbing, no calf tenderness, normal cap refill, no joint swelling.  SKIN: Warm and dry, no erythema noted, no rashes or lesions.  Skin has some faded bruising noted possible old IV sites.  NEUROLOGICAL: The patient is oriented to person, place and time. Strength and sensation are grossly intact. Face is symmetric.        Social distancing practiced for assessment whenever possible as well as PPE utilized for visit today.            LABS:    Lab Results   Component Value Date    WBC 7.8 03/10/2021    HGB 11.5 (L) 03/10/2021    HCT 34.2 (L) 03/10/2021    MCV 89 03/10/2021     03/10/2021       Results for orders placed or performed during the hospital encounter of 03/09/21   Basic Metabolic Panel   Result Value Ref Range    Sodium 139 136 - 145 mmol/L    Potassium 4.4 3.5 - 5.0 mmol/L    Chloride 109 (H) 98 - 107 mmol/L    CO2 24 22 - 31 mmol/L    Anion Gap, Calculation 6 5 - 18 mmol/L    Glucose 96 70 - 125 mg/dL    Calcium 8.2 (L) 8.5 - 10.5 mg/dL    BUN 27 8 - 28 mg/dL    Creatinine 1.11 (H) 0.60 - 1.10 mg/dL    GFR MDRD Af Amer 58 (L) >60 mL/min/1.73m2    GFR MDRD Non Af Amer 48 (L) >60 mL/min/1.73m2           No results found for: HGBA1C  Vitamin D, Total (25-Hydroxy)   Date Value Ref Range Status   10/15/2019 47.6 30.0 - 80.0 ng/mL Final     No results found for: [id number]    ASSESSMENT/PLAN:  1. Pulmonary emphysema, unspecified emphysema type (H)    2. Compression fracture of T12 vertebra with routine healing, subsequent encounter    3. Physical deconditioning    4. Pain due to fracture    5. ACP (advance care planning)      1.  Pulmonary emphysema: Is in a deconditioned state status post hospitalization on chronic  oxygen  Will have PT and OT have services as well as skilled nurses to manage medical conditions and monitor oxygen saturations for oxygen.    2/3/4: Prior to this hospitalization patient did have compression compression fracture of T12 she is physically deconditioned and she has pains due to her fracture.  Unfortunately the hospital did not send her with a tramadol prescription although they have been ordered.  I am going to make some med adjustments for her comfort her tramadol has been ordered, we scheduled the Voltaren.  I also ordered Vistaril 25 mg p.o. 3 times daily to go along with her scheduled Tylenol to enhance pain management.    5, ACP is a full code, we did review her POLST today and confirmed that her CODE STATUS is full this per facility policy and POLST is completed with the patient.      Electronically signed by:  Monet Byrd CNP  This progress note was completed using Dragon software and there may be grammatical errors.      55 minutes spent of which greater than 60% was face to face communication with the patient about above plan of care  Including pain management with many med adjustments, medical care for seniors role in her care, as well as her ACP TCU routines and overall med review today.  Adjustments made and med rec done. Post Discharge Medication Reconciliation Status: discharge medications reconciled and changed, per note/orders

## 2021-06-21 NOTE — LETTER
"Letter by Monet Byrd CNP at      Author: Monet Byrd CNP Service: -- Author Type: --    Filed:  Encounter Date: 2021 Status: (Other)         Patient: Irene León   MR Number: [id number]   YOB: 1945   Date of Visit: 2021       [facility]      NAME:  Irene León             :  1945    MRN: [id number]    CODE STATUS:  FULL CODE    FACILITY: [facility] [[id number]]         CHIEF COMPLAIN/REASON FOR VISIT:  Chief Complaint   Patient presents with   ? Review Of Multiple Medical Conditions     skin with redness       HISTORY OF PRESENT ILLNESS: Irene León is a 76 y.o. female being seen at the request of the nurses as they report red skin under left breast. Met with pt to review supposed skin impairment, no impairment is present. Pt up in chair, shortness of breath greatly improved since started scheduled nebs.0n 02 as per home status. CXR w/o pneumonia or CHF, but says does not ro COPD, pt with PMH of this. At [facility] from 3/23 to 3/30/2021 and per her EMR \"with PMH of COPD (on 2L at home) with two recent admissions at [facility], CHF (EF 40%), factor 5 Leiden on anticoagulation, CKD stage 3, breast cancer, depression and anxiety who presented to the ED 3/23 due to acute weakness and encephalopathy and found to have E. Coli pyelonephritis and bacteremia with severe sepsis:     Severe sepsis  Pyelonephritis   E. Coli bacteremia  Possible pneumonia  - improved with ceftriaxone; finished 7 day course     Chronic HFrEF with acute exacerbation.  - Echocardiogram this admit shows EF 45% w/elevated right atrial/ventricular pressures, near baseline per care everywhere (had similar EF on ECHO at outside hospital recently as well).  - on admission was hypovolemic and septic, but became hypervolemic following fluid resuscitation for sepsis  - Has diuresed well on IV lasix  - transition to orals 80/40 for discharge, which may need to be adjusted. " Patient was on 80 daily at home, but per daughter this was not controlling her peripheral edema nor cough from pulmonary edema (daughter is RN) although patient wasn't that compliant.  - Toprol increased from 76 yo 100mg daily  - ARB on hold due to lower pressures (may need to resume after discharge) Was on Losartan 50mg PTA     Acute kidney injury likely 2/2 sepsis  -resolved     Hypomag: stable today  Hypokalemia: repleted     Diarrhea: new, started a few days into hospitalization. C. Diff not sent per hospital protocol as no fever/abdominal pain and leukocytosis is improving; Most likely 2/2 antibiotics.Continue imodium; recommend c. Diff testing if diarrhea does not improve off of antibiotics.      Acute on Chronic hypoxemic respiratory failure due to COPD (on 2L at baseline) with pulmonary emphysema moderate to severe:  Has recurrent COPD exacerbation requiring hospitalization, most recently 3/9-3/11. COPD seemed stable here in hospital. Continued on inhalers. Weaned down to 2L of oxygen at discharge      L rib pain and T12 compression fracture  Since a fall 6 weeks ago, was on tramadol + lidocaine patches. Endorses ongoing severe pain. Oral dilaudid and lidocaine patch attempted in hospital after tramadol wasn't helping. However, this didn't seem to help wither.    D/w pain MD, Dr. Lima to get additional ideas to control her pain; he suggested abdominal binder and short course celebrex. Consider robaxin if this doesn't help.     Essential Hypertension:  - under control with metoprolol and diuresis  - hold ARB; resume if BP increases     Hyperlipidemia   -  statin     Overactive bladder  - Holding pta Myrbetriq ER + solifenacin (resume on discharge)      Depression and anxiety with insomnia:  - pta 150 mg venlafaxine daily - resume; d/w pharmacy to start at 75 for a few days then increase back to home dose  - pta 3 mg melatonin HS, 50 mg trazodone HS prn - dc for prolonged QTc     It looks like patient has  "chronically prolonged QTc on review of records;     Atrial fibrillation: Patient was on 20mg Xarelto PTA. I see one note re: afib in her outpatient records; she did have several episodes of afib with RVR here in the hospital. She thinks she is in on Xarelto for TIA with factor V leidin, which is not an indication. Given h/o PAF will continue Xarelto at reduced dose of 15mg. She is currently in sinus. I will order a event monitor for her on discharge to see if when not septic she truly has afib. Given her falls risk, would recommend dc Xarelto if no PAF or clear indication.      Resolved issues this admission:    Hypotension 2/2 sepsis    Acute encephalopathy 2/2 sepsis    Acute metabolic acidosis with tachypnea for compensation and no pulmonary reserve     Demand ischemia     Code Status: discussed on 3/27 with Stephanie; she wants to remain full code; d/w Leelee that I'm concerned and wish that she would accept more discussions re: advance directive/goals of care. Recommended Leelee approach Stephanie's PCP on this matter or refer for palliative care consult if this is available to her as outpatient. \"  Reports shortness of breath stable,  02 use at 2 l. She is feeling shortness of breath improved and   lungs clear. Continue current POC.    Allergies   Allergen Reactions   ? Sulfa (Sulfonamide Antibiotics) Rash     Headaches and dizziness.   ? Homeopathic Drugs Unknown and Other (See Comments)     Comment: runny nose, Comment: runny nose   ? Mold Extracts    ? Morphine (Pf)      hallucinate   ? Lisinopril Cough, Unknown and Itching     Comment: cough, Comment: cough   ? Sulfacetamide Sodium Rash   :     Current Outpatient Medications   Medication Sig   ? acetaminophen (TYLENOL) 500 MG tablet Take 1,000 mg by mouth 3 (three) times a day.   ? albuterol (PROAIR HFA;PROVENTIL HFA;VENTOLIN HFA) 90 mcg/actuation inhaler Inhale 2 puffs every 4 (four) hours as needed for wheezing.   ? ascorbic acid, vitamin C, (VITAMIN C) 1000 MG " tablet Take 1,000 mg by mouth daily. Airborne 1 tab daily   ? azelastine (ASTELIN) 137 mcg (0.1 %) nasal spray 2 sprays into each nostril daily.   ? azithromycin (ZITHROMAX) 250 MG tablet Take 1 tablet (250 mg total) by mouth at bedtime.   ? benzonatate (TESSALON) 200 MG capsule Take 1 capsule (200 mg total) by mouth 3 (three) times a day as needed for cough.   ? chlorhexidine (PERIDEX) 0.12 % solution Apply 15 mL to the mouth or throat at bedtime as needed.    ? cholecalciferol, vitamin D3, 1,000 unit tablet Take 1,000 Units by mouth daily.   ? diclofenac sodium (VOLTAREN) 1 % Gel Apply 1 g topically 3 (three) times a day.    ? EPINEPHrine (EPIPEN/ADRENACLICK/AUVI-Q) 0.3 mg/0.3 mL injection Inject 0.3 mg into the shoulder, thigh, or buttocks.   ? fluticasone (FLONASE) 50 mcg/actuation nasal spray Apply 1 spray into each nostril 2 (two) times a day as needed for allergies.    ? fluticasone-umeclidinium-vilanterol (TRELEGY ELLIPTA) 100-62.5-25 mcg DsDv inhaler Inhale 1 Inhalation daily.   ? furosemide (LASIX) 40 MG tablet Take 1 tablet (40 mg total) by mouth 2 (two) times a day. (Patient taking differently: Take 80 mg by mouth every morning. and 40mg at 2pm)   ? gabapentin (NEURONTIN) 300 MG capsule Take 2 capsules (600 mg total) by mouth 2 (two) times a day.   ? hydrOXYzine pamoate (VISTARIL) 25 MG capsule Take 25 mg by mouth 3 (three) times a day.    ? ipratropium (ATROVENT HFA) 17 mcg/actuation inhaler Inhale 2 puffs every 6 (six) hours.   ? loperamide (IMODIUM) 2 mg capsule Take 4 mg by mouth as needed for diarrhea (Every 3 hours as needed for Diarrhea).   ? melatonin 3 mg Tab tablet Take 1 tablet (3 mg total) by mouth at bedtime.   ? metoprolol succinate (TOPROL-XL) 50 MG 24 hr tablet Take 100 mg by mouth at bedtime.   ? MYRBETRIQ 50 mg Tb24 ER tablet TAKE 1 TABLET DAILY   ? pantoprazole (PROTONIX) 40 MG tablet Take 1 tablet (40 mg total) by mouth daily.   ? polyethylene glycol (MIRALAX) 17 gram packet Take 1  packet (17 g total) by mouth daily as needed.   ? rivaroxaban ANTICOAGULANT (XARELTO) 15 mg tablet Take 15 mg by mouth daily with supper.   ? simvastatin (ZOCOR) 40 MG tablet Take 1 tablet (40 mg total) by mouth at bedtime.   ? sodium chloride (OCEAN) 0.65 % nasal spray 2 sprays into each nostril every 2 (two) hours as needed for congestion.    ? solifenacin (VESICARE) 5 MG tablet Take 1 tablet (5 mg total) by mouth at bedtime.   ? tamoxifen (NOLVADEX) 20 MG tablet TAKE 1 TABLET DAILY   ? traMADoL (ULTRAM) 50 mg tablet Take 1 tablet (50 mg total) by mouth every 6 (six) hours as needed for pain or severe pain (7-10).   ? venlafaxine (EFFEXOR-XR) 150 MG 24 hr capsule TAKE 1 CAPSULE DAILY         REVIEW OF SYSTEMS:    Currently, no fever, chills, or rigors. Does not have any visual or hearing problems. Denies any chest pain, headaches, palpitations, lightheadedness, dizziness, NO CHANGE IN LEVEL OF  shortness of breath, or cough. Appetite is good. Denies any GERD symptoms. Denies any difficulty with swallowing, nausea, or vomiting.  Denies any abdominal pain, diarrhea or constipation. Denies any urinary symptoms. No insomnia. No active bleeding. No rash.       PHYSICAL EXAMINATION:  Vitals:    04/12/21 1505   BP: 107/70   Pulse: 85   Temp: 98.1  F (36.7  C)   Weight: 132 lb 9.6 oz (60.1 kg)         GENERAL: Awake, Alert, oriented x3, not in any form of acute distress, answers questions appropriately, follows simple commands, conversant  HEENT: Head is normocephalic with normal hair distribution. No evidence of trauma. Ears: No acute purulent discharge. Eyes: Conjunctivae pink with no scleral jaundice. Nose: Normal mucosa and septum. NECK: Supple with no cervical or supraclavicular lymphadenopathy. Trachea is midline.  LUNGS: Clear to auscultation  ABDOMEN: Globular and soft, nontender to palpation, non distended, no masses, no organomegaly, good bowel sounds, no rebound or guarding, no peritoneal signs.   EXTREMITIES:  Atraumatic. Full range of motion on both upper and lower extremities, there is no tenderness to palpation, +  pedal edema, no cyanosis or clubbing, no calf tenderness, normal cap refill, no joint swelling.  SKIN: Warm and dry, no erythema noted, no rashes or lesions.  Skin has some faded bruising noted possible old IV sites.  NEUROLOGICAL: The patient is oriented to person, place and time. Strength and sensation are grossly intact. Face is symmetric.        Social distancing practiced for assessment whenever possible as well as PPE utilized for visit today.            LABS:    Lab Results   Component Value Date    WBC 9.5 04/05/2021    HGB 9.8 (L) 04/05/2021    HCT 31.2 (L) 04/05/2021    MCV 89 04/05/2021     04/05/2021       Results for orders placed or performed in visit on 04/05/21   Basic Metabolic Panel   Result Value Ref Range    Sodium 141 136 - 145 mmol/L    Potassium 3.9 3.5 - 5.0 mmol/L    Chloride 104 98 - 107 mmol/L    CO2 27 22 - 31 mmol/L    Anion Gap, Calculation 10 5 - 18 mmol/L    Glucose 77 70 - 125 mg/dL    Calcium 8.5 8.5 - 10.5 mg/dL    BUN 17 8 - 28 mg/dL    Creatinine 1.32 (H) 0.60 - 1.10 mg/dL    GFR MDRD Af Amer 47 (L) >60 mL/min/1.73m2    GFR MDRD Non Af Amer 39 (L) >60 mL/min/1.73m2           No results found for: HGBA1C  Vitamin D, Total (25-Hydroxy)   Date Value Ref Range Status   10/15/2019 47.6 30.0 - 80.0 ng/mL Final     No results found for: [id number]    ASSESSMENT/PLAN:  1. Pulmonary emphysema, unspecified emphysema type (H)    2. At high risk for impaired skin integrity      1. COPD/  Pulmonary emphysema: Is in a deconditioned state status post hospitalization on chronic oxygen  Will have PT and OT have services as well as skilled nurses to manage medical conditions and monitor oxygen saturations for oxygen.  Get approval for nebulizers vs MDI    2. Physical deconditioned and at risk for skin impairment. : Continues with PT/OT with energy saving techniques, safety and  strengthening, SN services for chronic medical conditons managment  No red skin on inspection. Encourage to offload, wear bra if able to prevent skin brak down.          Electronically signed by:  Monet Byrd CNP  This progress note was completed using Dragon software and there may be grammatical errors.

## 2021-06-21 NOTE — LETTER
Letter by Monet Byrd CNP at      Author: Monet Byrd CNP Service: -- Author Type: --    Filed:  Encounter Date: 2021 Status: (Other)         Patient: Irene León   MR Number: [id number]   YOB: 1945   Date of Visit: 2021     [facility]      NAME:  Irene León             :  1945  MRN: [id number]  CODE STATUS:  FULL CODE    VISIT TYPE: DISCHARGE SUMMARY  FACILYTY: [facility] [[id number]]                    PRIMARY CARE PROVIDER: Pankaj Montanez MD    DISCHARGE DIAGNOSIS:      1. Pulmonary emphysema, unspecified emphysema type (H)    2. COPD exacerbation (H)    3. Compression fracture of T12 vertebra with routine healing, subsequent encounter         DISCHARGE MEDICATIONS:  Post Discharge Medication Reconciliation Status: discharge medications reconciled and changed, per note/orders       Medication List          Accurate as of 2021  5:20 PM. If you have any questions, ask your nurse or doctor.            CHANGE how you take these medications    furosemide 40 MG tablet  Commonly known as: LASIX  Take 1 tablet (40 mg total) by mouth 2 (two) times a day.  What changed:     how much to take    when to take this    additional instructions        CONTINUE taking these medications    acetaminophen 500 MG tablet  Commonly known as: TYLENOL     * albuterol 0.63 mg/3 mL nebulizer solution  Commonly known as: ACCUNEB     * albuterol 90 mcg/actuation inhaler  Commonly known as: PROAIR HFA;PROVENTIL HFA;VENTOLIN HFA  Inhale 2 puffs every 4 (four) hours as needed for wheezing.     azelastine 137 mcg (0.1 %) nasal spray  Commonly known as: ASTELIN     azithromycin 250 MG tablet  Commonly known as: ZITHROMAX  Take 1 tablet (250 mg total) by mouth at bedtime.     benzonatate 200 MG capsule  Commonly known as: TESSALON  Take 1 capsule (200 mg total) by mouth 3 (three) times a day as needed for cough.     chlorhexidine 0.12 %  solution  Commonly known as: PERIDEX     cholecalciferol (vitamin D3) 1,000 unit (25 mcg) tablet     diclofenac sodium 1 % Gel  Commonly known as: VOLTAREN     EPINEPHrine 0.3 mg/0.3 mL injection  Commonly known as: EPIPEN/ADRENACLICK/AUVI-Q     fluticasone propionate 50 mcg/actuation nasal spray  Commonly known as: FLONASE     fluticasone-umeclidinium-vilanterol 100-62.5-25 mcg Dsdv inhaler  Commonly known as: TRELEGY ELLIPTA  Inhale 1 Inhalation daily.     gabapentin 300 MG capsule  Commonly known as: NEURONTIN  Take 2 capsules (600 mg total) by mouth 2 (two) times a day.     hydrOXYzine pamoate 25 MG capsule  Commonly known as: VISTARIL     loperamide 2 mg capsule  Commonly known as: IMODIUM     melatonin 3 mg Tab tablet  Take 1 tablet (3 mg total) by mouth at bedtime.     metoprolol succinate 50 MG 24 hr tablet  Commonly known as: TOPROL-XL     Myrbetriq 50 mg Tb24 ER tablet  Generic drug: mirabegron  TAKE 1 TABLET DAILY     pantoprazole 40 MG tablet  Commonly known as: PROTONIX  Take 1 tablet (40 mg total) by mouth daily.     polyethylene glycol 17 gram packet  Commonly known as: MIRALAX  Take 1 packet (17 g total) by mouth daily as needed.     rivaroxaban ANTICOAGULANT 15 mg tablet  Commonly known as: XARELTO     simvastatin 40 MG tablet  Commonly known as: ZOCOR  Take 1 tablet (40 mg total) by mouth at bedtime.     sodium chloride 0.65 % nasal spray  Commonly known as: OCEAN     solifenacin 5 MG tablet  Commonly known as: VESICARE  Take 1 tablet (5 mg total) by mouth at bedtime.     tamoxifen 20 MG tablet  Commonly known as: NOLVADEX  TAKE 1 TABLET DAILY     traMADoL 50 mg tablet  Commonly known as: ULTRAM  Take 1 tablet (50 mg total) by mouth every 6 (six) hours as needed for pain or severe pain (7-10).     venlafaxine 150 MG 24 hr capsule  Commonly known as: EFFEXOR-XR  TAKE 1 CAPSULE DAILY     vitamin C 1000 MG tablet  Generic drug: ascorbic acid (vitamin C)         * This list has 2 medication(s) that are  "the same as other medications prescribed for you. Read the directions carefully, and ask your doctor or other care provider to review them with you.            STOP taking these medications    ipratropium 17 mcg/actuation inhaler  Commonly known as: ATROVENT HFA            HISTORY OF PRESENT ILLNESS: Irene León is a 76 y.o. female is being seen for a face to face visit for an anticipated discontinue on 4/27/21. She will require a face to face visit for home health services. We reviewed meds and ordered a nebulizer for home. Per her EMR \" At [facility] from 3/23 to 3/30/2021 and per her EMR \"with PMH of COPD (on 2L at home) with two recent admissions at [facility], CHF (EF 40%), factor 5 Leiden on anticoagulation, CKD stage 3, breast cancer, depression and anxiety who presented to the ED 3/23 due to acute weakness and encephalopathy and found to have E. Coli pyelonephritis and bacteremia with severe sepsis:     Severe sepsis  Pyelonephritis   E. Coli bacteremia  Possible pneumonia  - improved with ceftriaxone; finished 7 day course     Chronic HFrEF with acute exacerbation.  - Echocardiogram this admit shows EF 45% w/elevated right atrial/ventricular pressures, near baseline per care everywhere (had similar EF on ECHO at outside hospital recently as well).  - on admission was hypovolemic and septic, but became hypervolemic following fluid resuscitation for sepsis  - Has diuresed well on IV lasix  - transition to orals 80/40 for discharge, which may need to be adjusted. Patient was on 80 daily at home, but per daughter this was not controlling her peripheral edema nor cough from pulmonary edema (daughter is RN) although patient wasn't that compliant.  - Toprol increased from 74 yo 100mg daily  - ARB on hold due to lower pressures (may need to resume after discharge) Was on Losartan 50mg PTA     Acute kidney injury likely 2/2 sepsis  -resolved     Hypomag: stable today  Hypokalemia: repleted     Diarrhea: new, started a few " days into hospitalization. C. Diff not sent per hospital protocol as no fever/abdominal pain and leukocytosis is improving; Most likely 2/2 antibiotics.Continue imodium; recommend c. Diff testing if diarrhea does not improve off of antibiotics.      Acute on Chronic hypoxemic respiratory failure due to COPD (on 2L at baseline) with pulmonary emphysema moderate to severe:  Has recurrent COPD exacerbation requiring hospitalization, most recently 3/9-3/11. COPD seemed stable here in hospital. Continued on inhalers. Weaned down to 2L of oxygen at discharge      L rib pain and T12 compression fracture  Since a fall 6 weeks ago, was on tramadol + lidocaine patches. Endorses ongoing severe pain. Oral dilaudid and lidocaine patch attempted in hospital after tramadol wasn't helping. However, this didn't seem to help wither.    D/w pain MD, Dr. Lima to get additional ideas to control her pain; he suggested abdominal binder and short course celebrex. Consider robaxin if this doesn't help.     Essential Hypertension:  - under control with metoprolol and diuresis  - hold ARB; resume if BP increases     Hyperlipidemia   -  statin     Overactive bladder  - Holding pta Myrbetriq ER + solifenacin (resume on discharge)      Depression and anxiety with insomnia:  - pta 150 mg venlafaxine daily - resume; d/w pharmacy to start at 75 for a few days then increase back to home dose  - pta 3 mg melatonin HS, 50 mg trazodone HS prn - dc for prolonged QTc     It looks like patient has chronically prolonged QTc on review of records;     Atrial fibrillation: Patient was on 20mg Xarelto PTA. I see one note re: afib in her outpatient records; she did have several episodes of afib with RVR here in the hospital. She thinks she is in on Xarelto for TIA with factor V leidin, which is not an indication. Given h/o PAF will continue Xarelto at reduced dose of 15mg. She is currently in sinus. I will order a event monitor for her on discharge to see  "if when not septic she truly has afib. Given her falls risk, would recommend dc Xarelto if no PAF or clear indication.      Resolved issues this admission:    Hypotension 2/2 sepsis    Acute encephalopathy 2/2 sepsis    Acute metabolic acidosis with tachypnea for compensation and no pulmonary reserve     Demand ischemia     Code Status: discussed on 3/27 with Stephanie; she wants to remain full code; d/w Leelee that I'm concerned and wish that she would accept more discussions re: advance directive/goals of care. Recommended Leelee approach Stephanie's PCP on this matter or refer for palliative care consult if this is available to her as outpatient. \"  She is excited to discontinue home, feeling stronger and ready to go.    SKILLED NURSING FACILITY COURSE:  During this TCU stay, patient completed all anticipated goals of therapy.      PHYSICAL EXAMINATION:    Vitals:    04/26/21 1713   BP: 114/69   Pulse: 64   Temp: 97.8  F (36.6  C)   Weight: 130 lb 12.8 oz (59.3 kg)         GENERAL: Awake, Alert, oriented x3, not in any form of acute distress, answers questions appropriately, follows simple commands, conversant  HEENT: Head is normocephalic with normal hair distribution. No evidence of trauma. Ears: No acute purulent discharge. Eyes: Conjunctivae pink with no scleral jaundice. Nose: Normal mucosa and septum. NECK: Supple with no cervical or supraclavicular lymphadenopathy. Trachea is midline.   CHEST: No tenderness or deformity, no crepitus  LUNG: Clear to auscultation with good chest expansion. 02 dependant and BS Clear but DM  EXTREMITIES: Atraumatic. Full range of motion on both upper and lower extremities, there is no tenderness to palpation, no pedal edema, no cyanosis or clubbing, no calf tenderness, normal cap refill, no joint swelling.  SKIN: Warm and dry, no erythema noted, no rashes or lesions.  NEUROLOGICAL: The patient is oriented to person, place and time. Strength and sensation are grossly intact. Face is " symmetric.      LABS:  All labs reviewed in the nursing home record.        DISCHARGE PLAN:.  The Patient is homebound due to: Deconditioned state sp hospitalization for respiratory distress and in weakened state and it is taxing and it will take a considerable amount of effort for patient to leave the home.  She is dependent on others for transportation.  The patient is confined to her home and needs intermittent skilled nursing, PT,OT, RN, SW, and HHA.    Patient to be followed by home care for physical therapy to eval and treat for strengthening, balance, endurance, and safety with mobility, and ambulation.  Patient to be followed by home care for occupational therapy to eval and treat for strengthening, ADL needs, adaptive equipment, and safety.  Patient to be followed by home care for nursing services for medication set up and teaching, symptom and disease processes monitoring and education.    Patient to be followed by home care for home health aid services for bathing and ADL needs.  Planned discharge.  All therapy goals have been met.  Family will assist with discharge and transportation.  Patient will follow up with PCP within 7 days after discharge for medication mangagment and appropriate lab studies.      Equipment   RX written for nebulizer      Electronically signed by:  Monet Byrd CNP  This progress note was completed using Dragon software and there may be grammatical errors.      For documentation purposes, chart review, medication management, and discharge coordination of care was greater than 35 minutes

## 2021-06-21 NOTE — LETTER
Letter by Paola Rose MBBS at      Author: Paola Rose MBBS Service: -- Author Type: --    Filed:  Encounter Date: 11/2/2020 Status: (Other)         Patient: Irene León   MR Number: [id number]   YOB: 1945   Date of Visit: 11/2/2020       [facility] Admission note      Patient: Irene León  MRN: [id number]  Date of Service: 11/2/2020      [facility] [[id number]]  Reason for Visit     Chief Complaint   Patient presents with   ? Review Of Multiple Medical Conditions   F/U PAIN /MUSCLE SPASMS/ chf    Code Status     FULL CODE    Assessment     -Acute CHF exacerbation with a weight gain of more than 5 pounds noted in the TCU with persistent cough and shortness of breath  -Acute low back pain patient not reporting any improvement with her current medication regimen  -Mood disorder family suspecting worsening depression which persists as narcotic dependence for chronic pain  -T12 compression fracture  -ANATOLIY with elevated creatinine 1.4 in the TCU  -Osteoporosis restarted on Prolia  -Pain management no longer on narcotics; no discharge on narcotics including Percocet  -Acute metabolic encephalopathy/delirium felt to be secondary to opioid medication  -CHF with reduced ejection fraction acute exacerbation  -- ANATOLIY /cardiorenal syndrome  -History of COPD and asthma  -Acute on chronic hypoxic respiratory failure ON 3L BY NC  -Peripheral arterial disease  -History of factor V Leiden and occipital stroke on Xarelto  -History of breast cancer  -Concern for mild dementia with baseline cognitive impairment  Slums 13/30  -Generalized weakness      Plan     Patient has been admitted to the TCU for strengthening and rehab  She was admitted to the hospital with a T12 compression fracture and back pain.  She was seen at the request of nursing and subsequently her daughter was updated.  Several concerns were reviewed.  Pain management reviewed.  She continues to request optimization  of her pain management.  However based on her pain scale it seems that post narcotic administration her pain has improved and she is reporting a pain of either 1-3.  Family has been calling concerned about prior history of narcotic addiction.  Patient educated that she is not a candidate for high doses of narcotics.  She is actually doing better with addition of Vistaril with no muscle spasms reported since then.  Continue compliance with TLSO.  Continue PT OT.  Continue topical Voltaren gel as well as Lidoderm gel.  Orthopedic referral given for her piriformis syndrome for consideration of a topical injection.  In addition we will continue with her Celebrex as well as with her calcitonin nasal spray with no stop date as yet.  Follow-up with Ortho also requested for consideration of kyphoplasty.  She is also reporting CHF exacerbation with a weight gain of more than 5 pounds.  Family is calling upset by her daily weights have not been pulled through  Reorder daily weights for her.  Due to worsening pulmonary edema and respiratory distress discontinue her Lasix regimen.  Start her on Lasix 80 mg in the morning and 40 mg in the evening.  Monitor weights closely.  Recheck BMP in 2 days.  Monitor for any signs of respiratory distress and consider adding an x-ray chest.  Also reviewed concerns with her daughter including concerns about cognition.  Daughter continues to believe that her mother has significant impairment in short-term memory.  Her slums was 23/30.  We also talked about her ability to come and visit her mother in the nursing home on a compassionate ground as she feels her care is being ignored.  She has several other concerns about the nursing home which will be addressed with the manager including not being given daily updates and other nursing concerns.  We also talked about the fact that the daughter believes that her mother has depression we talked about Effexor.  She is already on 150 mg which is a  moderate dose.  Daughter believes she has narcotic dependence in the past and wants her to taper off.  It was agreed that next visit I will talk to her about discontinuing her or increasing the interval of her narcotics.  After reviewing with daughter we will not change the dose of her antidepressant.  Discharge planning reviewed with her her  is at home and he has Alzheimer's dementia and daughter is concerned about her compliance with oxygen and compliance with medications if she comes home.  Concerns about medical power of  decision making was also reviewed.  Total time spent is 40 minutes with more than 30 minutes spent with the daughter and the patient reviewing the multiple care concerns as outlined in the note above including pain management cognition weaning of narcotics depression and discharge planning  Daughter will also petition the nursing home to see if they will let her come inside the facility to be with her mother as an essential caregiver at least once a week on a compassionate ground      History     Patient is a very pleasant 75 y.o. female who is admitted to TCU.  Patient presented to the hospital with acute low back pain.  She was admitted in the hospital.  Imaging revealed acute/subacute inferior endplate compression fracture of T12 with 50% loss of height.  Orthopedics was consulted.  She has been discharged weightbearing as tolerated with a TLSO brace to be worn all day  Outpatient follow-up for consideration of kyphoplasty recommended in 2 to 3 weeks.  She has underlying history of osteoporosis and her Prolia will be restarted  Pain management reviewed with the patient she is on multiple medications including Tylenol with gabapentin.  Pain management reviewed with both patient and her daughter.  We reviewed her various pain management options including the fact that she is on as needed narcotics because she could not tolerate high doses of narcotics.  Patient is requesting  that medications be scheduled.  Her daughter also has been calling concerned that her mother has a prior history of narcotic dependence.  It was agreed that the patient may not be rating her pain as accurately and may be beneficial for her to taper.  There is also concern about CHF exacerbation with patient reporting progressive weight gain she is now has a wet sounding cough.  Daughter has been calling very concerned that her care concerns have not been addressed.  Cognition was also reviewed especially as daughter strongly believes that her mother is having a decline in her cognition which is not being picked up accurately by testing    R/C SLUMS 23/30      Past Medical History     Active Ambulatory (Non-Hospital) Problems    Diagnosis   ? Pain due to fracture   ? High risk medication use   ? Heart failure exacerbated by sotalol (H)   ? T12 compression fracture (H)   ? Acute on chronic diastolic congestive heart failure (H)   ? Metabolic encephalopathy   ? Low back pain   ? Compression fracture of L1 vertebra, sequela   ? Chronic respiratory failure with hypoxia (H)   ? Chronic obstructive pulmonary disease, unspecified COPD type (H)   ? Chronic systolic congestive heart failure (H)   ? Hypoxia   ? S/P hip replacement, left   ? Hip pain, left   ? Acute hypoxemic respiratory failure (H)   ? Chest pain   ? COPD exacerbation (H)   ? Herpes zoster without complication   ? Chest wall pain   ? Benign essential hypertension   ? Dyslipidemia   ? Acute respiratory failure with hypoxia (H)   ? Pulsatile tinnitus of both ears   ? Hot flashes due to tamoxifen   ? Pleural nodules   ? Hot flashes, menopausal   ? Malignant neoplasm of central portion of left female breast (H)   ? CKD (chronic kidney disease) stage 3, GFR 30-59 ml/min   ? Dyspnea   ? Insomnia   ? Leg pain, left   ? Femur fracture (H)   ? GERD (gastroesophageal reflux disease)   ? Post menopausal syndrome   ? OP (osteoporosis)   ? Hypertension   ? Pulmonary  emphysema, unspecified emphysema type (H)   ? Hyperlipidemia   ? Centrilobular emphysema (H)   ? Anisocoria   ? OAB (overactive bladder)   ? Factor 5 Leiden mutation, heterozygous (H)   ? Family history of blood disease   ? Bladder incontinence   ? Physical deconditioning   ? Cardiomyopathy, idiopathic (H)   ? Depression with anxiety   ? Osteoarthritis of hip   ? Herniated intervertebral disc     Past Medical History:   Diagnosis Date   ? Arrhythmia    ? Breast cancer (H) 2017   ? CHF (congestive heart failure) (H)    ? COPD (chronic obstructive pulmonary disease) (H)    ? Coronary artery disease    ? GERD (gastroesophageal reflux disease)    ? Hx of radiation therapy 2017   ? Hyperlipidemia    ? Hypertension    ? Idiopathic cardiomyopathy (H)        Past Social History     Reviewed, and she  reports that she quit smoking about 13 years ago. She quit smokeless tobacco use about 16 years ago. She reports that she does not drink alcohol or use drugs.    Family History     Reviewed, and family history includes Breast cancer in her maternal aunt; Osteoporosis in an other family member; Prostate cancer in her brother and maternal uncle.    Medication List   Post Discharge Medication Reconciliation Status: discharge medications reconciled, continue medications without change   Current Outpatient Medications on File Prior to Visit   Medication Sig Dispense Refill   ? acetaminophen (TYLENOL) 325 MG tablet Take 2 tablets (650 mg total) by mouth 3 (three) times a day. (Patient taking differently: Take 650 mg by mouth 4 (four) times a day. )  0   ? ADVAIR DISKUS 500-50 mcg/dose DISKUS USE 1 INHALATION TWICE A DAY 60 each 11   ? albuterol (PROAIR HFA;PROVENTIL HFA;VENTOLIN HFA) 90 mcg/actuation inhaler Inhale 2 puffs every 4 (four) hours as needed for wheezing. 1 Inhaler 0   ? ascorbic acid, vitamin C, (VITAMIN C) 1000 MG tablet Take 1,000 mg by mouth daily. Airborne 1 tab daily     ? aspirin 81 MG EC tablet Take 81 mg by mouth  daily.     ? azelastine (ASTELIN) 137 mcg (0.1 %) nasal spray 2 sprays into each nostril daily.     ? benzonatate (TESSALON) 200 MG capsule Take 1 capsule (200 mg total) by mouth 3 (three) times a day as needed for cough. 90 capsule 2   ? calcitonin, salmon, (MIACALCIN) 200 unit/actuation nasal spray Apply 1 spray into alternating nostrils daily for 14 days.  0   ? celecoxib (CELEBREX) 200 MG capsule Take 200 mg by mouth 2 (two) times a day.     ? chlorhexidine (PERIDEX) 0.12 % solution Apply 15 mL to the mouth or throat at bedtime as needed.      ? cholecalciferol, vitamin D3, 1,000 unit tablet Take 1,000 Units by mouth daily.     ? diclofenac sodium (VOLTAREN) 1 % Gel Apply 4 g to area of pain in low back 4 times daily  0   ? EPINEPHrine (EPIPEN/ADRENACLICK/AUVI-Q) 0.3 mg/0.3 mL injection Inject 0.3 mg into the shoulder, thigh, or buttocks.     ? famotidine (PEPCID) 20 MG tablet Take 20 mg by mouth daily.     ? fluticasone (FLONASE) 50 mcg/actuation nasal spray Apply 1 spray into each nostril as needed.      ? furosemide (LASIX) 40 MG tablet Take 1 tablet (40 mg total) by mouth 2 (two) times a day at 9am and 6pm.  0   ? gabapentin (NEURONTIN) 300 MG capsule Take 2 capsules (600 mg total) by mouth 2 (two) times a day. 360 capsule 2   ? HYDROcodone-acetaminophen 5-325 mg per tablet Take 1 tablet by mouth every 4 (four) hours as needed. 90 tablet 0   ? ipratropium (ATROVENT HFA) 17 mcg/actuation inhaler Inhale 2 puffs every 6 (six) hours. 3 Inhaler 3   ? ipratropium-albuterol (DUO-NEB) 0.5-2.5 mg/3 mL nebulizer 1 neb 4 times a day until seen by primary and then as needed or as directed 60 vial 1   ? lidocaine (XYLOCAINE) 5 % ointment Apply to area of pain in low back 4 times daily 35.44 g 0   ? losartan (COZAAR) 50 MG tablet TAKE 1 TABLET DAILY 90 tablet 3   ? melatonin 3 mg Tab tablet Take 1 tablet (3 mg total) by mouth at bedtime.  0   ? methocarbamoL (ROBAXIN) 750 MG tablet Take 1 tablet (750 mg total) by mouth  every 6 (six) hours for 14 days.  0   ? metoprolol succinate (TOPROL-XL) 50 MG 24 hr tablet Take 1.5 tablets (75 mg total) by mouth daily. 135 tablet 3   ? mirabegron (MYRBETRIQ) 50 mg Tb24 ER tablet Take 1 tablet (50 mg total) by mouth daily. 90 tablet 3   ? omalizumab (XOLAIR) 150 mg/mL Syrg injection Inject 300 mg under the skin every 14 (fourteen) days.      ? pantoprazole (PROTONIX) 40 MG tablet Take 1 tablet (40 mg total) by mouth daily. 90 tablet 4   ? polyethylene glycol (MIRALAX) 17 gram packet Take 1 packet (17 g total) by mouth daily as needed.  0   ? QUEtiapine (SEROQUEL) 25 MG tablet Take 0.5 tablets (12.5 mg total) by mouth 3 (three) times a day as needed (Agitation that is not redirectable.).  0   ? rivaroxaban ANTICOAGULANT (XARELTO) 20 mg tablet Take 20 mg by mouth at bedtime.     ? senna-docusate (PERICOLACE) 8.6-50 mg tablet Take 1 tablet by mouth daily as needed for constipation.  0   ? simvastatin (ZOCOR) 40 MG tablet Take 1 tablet (40 mg total) by mouth at bedtime. 90 tablet 3   ? sodium chloride (OCEAN) 0.65 % nasal spray 2 sprays into each nostril as needed.     ? solifenacin (VESICARE) 5 MG tablet Take 1 tablet (5 mg total) by mouth at bedtime. 90 tablet 3   ? tamoxifen (NOLVADEX) 20 MG tablet TAKE 1 TABLET DAILY 90 tablet 4   ? traZODone (DESYREL) 50 MG tablet TAKE 1 TABLET(50 MG) BY MOUTH AT BEDTIME AS NEEDED 90 tablet 2   ? triamcinolone (KENALOG) 0.1 % cream Apply to rash on arms twice daily for one week 30 g 0   ? triamcinolone (KENALOG) 0.5 % ointment Apply o foot twice a day 30 g 1   ? venlafaxine (EFFEXOR-XR) 150 MG 24 hr capsule TAKE 1 CAPSULE DAILY 90 capsule 4     Current Facility-Administered Medications on File Prior to Visit   Medication Dose Route Frequency Provider Last Rate Last Dose   ? denosumab 60 mg (PROLIA 60 mg/ml)  60 mg Subcutaneous Q6 Months Suzanna Shane MD   60 mg at 02/04/20 1352       Allergies     Allergies   Allergen Reactions   ? Sulfa (Sulfonamide  Antibiotics) Rash     Headaches and dizziness.   ? Homeopathic Drugs Unknown and Other (See Comments)     Comment: runny nose, Comment: runny nose   ? Mold Extracts    ? Morphine (Pf)      hallucinate   ? Lisinopril Cough, Unknown and Itching     Comment: cough, Comment: cough   ? Sulfacetamide Sodium Rash       Review of Systems   A comprehensive review of 14 systems was done. Pertinent findings noted here and in history of present illness. All the rest negative.  Constitutional: Negative.  Negative for fever, chills, she has activity change, appetite change and fatigue.   HENT: Negative for congestion and facial swelling.    Eyes: Negative for photophobia, redness and visual disturbance.   Respiratory: Negative for cough and chest tightness.    Cardiovascular: Negative for chest pain, palpitations and leg swelling.   Gastrointestinal: Negative for nausea, diarrhea, constipation, blood in stool and abdominal distention.   Genitourinary: Negative.    Musculoskeletal: Negative.  Pain in her low back; today reporting intractable muscle spasms which are not improving  Has piriformis pain  Skin: Negative.    Neurological: Negative for dizziness, tremors, syncope, weakness, light-headedness and headaches.   Hematological: Does not bruise/bleed easily.   Psychiatric/Behavioral: Negative.  Significant distress due to pain      Physical Exam     Recent Vitals 10/24/2020   Height -   Weight -   BSA (m2) -   /88   Pulse 85   Temp 97.5   Temp src 1   SpO2 92   Some recent data might be hidden     Currently on 3 L of oxygen by nasal cannula wt 150lb  Constitutional: Oriented to person, place, and time and appears well-developed.   HEENT:  Normocephalic and atraumatic.  Eyes: Conjunctivae and EOM are normal. Pupils are equal, round, and reactive to light. No discharge.  No scleral icterus. Nose normal. Mouth/Throat: Oropharynx is clear and moist. No oropharyngeal exudate.    NECK: Normal range of motion. Neck supple. No  JVD present. No tracheal deviation present. No thyromegaly present.   CARDIOVASCULAR: Normal rate, regular rhythm and intact distal pulses.  Exam reveals no gallop and no friction rub.  Systolic murmur present.  PULMONARY: Effort normal and breath sounds normal. No respiratory distress.No Wheezing or rales.  Wet cough noted  ABDOMEN: Soft. Bowel sounds are normal. No distension and no mass.  There is no tenderness. There is no rebound and no guarding. No HSM.  MUSCULOSKELETAL: Normal range of motion. No edema and no tenderness. Mild kyphosis,  tenderness  to palpation on her lower thoracic spine  Mobility is limited.  LYMPH NODES: Has no cervical, supraclavicular, axillary and groin adenopathy.   NEUROLOGICAL: Alert and oriented to person, place, and time. No cranial nerve deficit.  Normal muscle tone. Coordination normal.   GENITOURINARY: Deferred exam.  SKIN: Skin is warm and dry. No rash noted. No erythema. No pallor.   EXTREMITIES: No cyanosis, no clubbing, no edema. No Deformity.  PSYCHIATRIC: Normal mood, affect and behavior.  High anxiety due to pain      Lab Results     Recent Results (from the past 240 hour(s))   Basic Metabolic Panel    Collection Time: 10/24/20  6:08 AM   Result Value Ref Range    Sodium 140 136 - 145 mmol/L    Potassium 4.1 3.5 - 5.0 mmol/L    Chloride 103 98 - 107 mmol/L    CO2 24 22 - 31 mmol/L    Anion Gap, Calculation 13 5 - 18 mmol/L    Glucose 89 70 - 125 mg/dL    Calcium 9.9 8.5 - 10.5 mg/dL    BUN 18 8 - 28 mg/dL    Creatinine 1.05 0.60 - 1.10 mg/dL    GFR MDRD Af Amer >60 >60 mL/min/1.73m2    GFR MDRD Non Af Amer 51 (L) >60 mL/min/1.73m2   Basic Metabolic Panel    Collection Time: 10/26/20  9:55 AM   Result Value Ref Range    Sodium 141 136 - 145 mmol/L    Potassium 4.7 3.5 - 5.0 mmol/L    Chloride 106 98 - 107 mmol/L    CO2 14 (L) 22 - 31 mmol/L    Anion Gap, Calculation 21 (H) 5 - 18 mmol/L    Glucose 91 70 - 125 mg/dL    Calcium 10.0 8.5 - 10.5 mg/dL    BUN 24 8 - 28 mg/dL     Creatinine 1.41 (H) 0.60 - 1.10 mg/dL    GFR MDRD Af Amer 44 (L) >60 mL/min/1.73m2    GFR MDRD Non Af Amer 36 (L) >60 mL/min/1.73m2   Calcium    Collection Time: 10/26/20  9:55 AM   Result Value Ref Range    Calcium 10.0 8.5 - 10.5 mg/dL   Magnesium    Collection Time: 10/26/20  9:55 AM   Result Value Ref Range    Magnesium 2.1 1.8 - 2.6 mg/dL   Phosphorus    Collection Time: 10/26/20  9:55 AM   Result Value Ref Range    Phosphorus 4.2 2.5 - 4.5 mg/dL   HM2(CBC w/o Differential)    Collection Time: 10/26/20  9:55 AM   Result Value Ref Range    WBC 10.8 4.0 - 11.0 thou/uL    RBC 4.90 3.80 - 5.40 mill/uL    Hemoglobin 14.5 12.0 - 16.0 g/dL    Hematocrit 45.7 35.0 - 47.0 %    MCV 93 80 - 100 fL    MCH 29.6 27.0 - 34.0 pg    MCHC 31.7 (L) 32.0 - 36.0 g/dL    RDW 13.8 11.0 - 14.5 %    Platelets 185 140 - 440 thou/uL    MPV 10.2 8.5 - 12.5 fL   COVID-19 Virus PCR MRF    Collection Time: 10/27/20  8:00 AM    Specimen: Respiratory   Result Value Ref Range    COVID-19 VIRUS SPECIMEN SOURCE Nasopharyngeal     2019-nCOV Not Detected                   VASHTI Sanchez

## 2021-06-21 NOTE — PROGRESS NOTES
[facility] Hematology and Oncology Progress Note    Patient: Irene León  MRN: [id number]  Date of Service: 11/14/2018        Reason for Visit    Chief Complaint   Patient presents with     HE Cancer     Breast Cancer       Assessment and Plan  Cancer Staging  Malignant neoplasm of central portion of left female breast (H)  Staging form: Breast, AJCC 7th Edition  - Pathologic stage from 7/26/2017: Stage IA (T1b, N0, cM0) - Signed by Devon Suarez MD on 8/1/2017  ER Status: Positive  PA Status: Positive  HER2 Status: Negative      ECOG Performance   ECOG Performance Status: 0     Distress Assessment  Distress Assessment Score: No distress    Pain  Currently in Pain: Yes  Pain Score (Initial OR Reassessment): 3  Location: bilat hips    # Stage IA (pT1b, pN0 (sn),cM0) invasive ductal carcinoma of the left breast, grade 1, associated DCIS, ER/PA positive, HER-2 negative, s/p left breast lumpectomy and left axillary sentinel lymph node biopsy. Right breast atypical ductal hyperplasia s/p right breast excisional biopsy on 6/20/2017.  She has several comorbidities including nonischemic cardiomyopathy (EF 40% in 3/17), osteoporosis on treatment with oral bisphosphonate, factor V Leiden mutation (details unknown). Started Tamoxifen in 12/2017.  #. Right breast skin dimpling at prior excisional biopsy site   There is no clinical or mammographic evidence of breast cancer recurrence.  Diagnostic right breast mammo and US did not show definitive mass. Explained that possibly from prior biopsy, but will follow.   She is OK to continue tamoxifen as her hot flushes are manageable with Effexor. However, she recently had a stroke resulting vision impairment. She is following with Dr. Wang from [facility] neurology clinic. She has significant vasomotor side effects with hot flashes, fatigue from tamoxifen.  I explained to her that increased risk of stroke with Tamoxifen and I will need to reach out to Dr. Wang. In the meantime, OK  to continue tamoxifen.    Mammogram due in May 2019 (time for follow up right breast and screening for left breast).   Follow-up with me in 6 months.    #.  Hot flushes   Failed gabapentin. Effexor works for her. Can increase to 150 mg daily, but stressed about side effects of high blood pressure and will need to follow her BP. She voiced understanding.     #. Hypertension, uncontrolled.   Reported her BP is controlled normally. To recheck and follow up with her PCP.      #.  Osteoporosis  #.  COPD  #.  Idiopathic cardiomyopathy- LVEF 40% in October 2018, no change from prior.     Problem List    1. Malignant neoplasm of central portion of left breast in female, estrogen receptor positive (H)        ______________________________________________________________________________    Diagnosis  6/20/17  Stage IA (pT1b, pN0(sn), cM0) invasive ductal carcinoma of the left breast  - ER (high positive, 98%), WI (high positive, 97%) HER2 (negative, score of 1+ by IHC)  - Catron grade 1  - Tumor size: <1 cm  - Margin-negative on final margin with reexcision for invasive carcinoma but close margin for DCIS of 0.2 cm from new medial margin.    - Angiolymphatic invasion present at the inferior and original medial margin.  - 1 sentinel lymph node removed, negative for carcinoma  - associated DCIS  - Right breast atypical ductal hyperplasia.    - Oncotype DX recurrence score 6 : 10 year risk of recurrence with 5 year of tamoxifen is 5%.    Therapy to date:  6/20/17 -right breast excisional biopsy AND left breast lumpectomy, left axillary sentinel lymph node biopsy  6/30/17-reexcision lumpectomy of the left breast  9/8/17-completed adjuvant radiation to the left breast 4256 cGy/16  12/6/17-initiated adjuvant endocrine therapy with tamoxifen.    History of Present Illness    Stephanie is here by herself today.      She reported she was diagnosed with occipital stroke. She originally thought it was cataract surgery and recently seen  "by neurology. Work up in progress. She is on plavix now, was on aspirin.   She has ongoing hot flashes and they are much better with Effexor. She wants to increase the dose of Effexor.  Her BP is elevated today, but she stated it was unusual for her.   She called a week ago due to dimpling in right breast that she did not recall it was there.     Pain Status  Currently in Pain: Yes    Review of Systems    Constitutional  Constitutional (WDL): Exceptions to WDL  Fatigue: None  Fever: None  Chills: None  Weight Gain: None  Weight Loss: None  Neurosensory  Neurosensory (WDL): All neurosensory elements are within defined limits  Eye   Eye Disorder (WDL): Exceptions to WDL  Blurred Vision: Intervention not indicated(area of peripheral vision affected by CVA ~3 weeks ago)  Ear  Ear Disorder (WDL): Exceptions to WDL  Ear Pain: None  Tinnitus: Mild symptoms, intervention not indicated(feels \"pulsing\" in ears-seeing ENT)  Cardiovascular  Cardiovascular (WDL): Exceptions to WDL  Palpitations: Definition: A disorder characterized by inflammation of the muscle tissue of the heart.(every once in awhile feels heart race)  Edema: No  Phlebitis: None  Superficial thrombophlebitis: None  Pulmonary  Respiratory (WDL): Exceptions to WDL  Cough: Mild symptoms, nonprescription intervention indicated  Dyspnea: Shortness of breath with moderate exertion  Hypoxia: None  Gastrointestinal  Gastrointestinal (WDL): Exceptions to WDL  Anorexia: Loss of appetite without alteration in eating habits  Constipation: None  Diarrhea: None  Dysphagia: None  Esophagitis: None  Nausea: None  Pharyngitis: None  Vomiting: None  Dysgeusia: None  Dry Mouth: None  Genitourinary  Genitourinary (WDL): Exceptions to WDL  Urinary Frequency: Present  Urinary Retention: None  Urinary Tract Pain: None  Lymphatic  Lymph (WDL): All lymph disorder elements are within defined limits  Musculoskeletal and Connective Tissue  Musculoskeletal and Connetive Tissue Disorders " (WDL): Exceptions to WDL  Arthralgia: Mild pain(bilat hips)  Bone Pain: None  Muscle Weakness : None  Myalgia: None  Integumentary  Integumentary (WDL): Exceptions to WDL(bruises easily-on plavix)  Alopecia: None  Rash Maculo-Papular: None  Pruritus: None  Urticaria: None  Palmar-Plantar Erythrodysesthesia Syndrome: None  Flushing: None  Patient Coping  Patient Coping: Accepting  Distress Assessment  Distress Assessment Score: No distress  Accompanied by  Accompanied by: Alone  Oral Chemo Adherence       Past History  Past Medical History:   Diagnosis Date     Arrhythmia      Breast cancer (H)      CHF (congestive heart failure) (H)      COPD (chronic obstructive pulmonary disease) (H)      Coronary artery disease      GERD (gastroesophageal reflux disease)      Hyperlipidemia      Hypertension      Idiopathic cardiomyopathy (H)        Physical Exam    Recent Vitals 11/14/2018   Height -   Weight -   BSA (m2) -   /98   Pulse -   Temp -   Temp src -   SpO2 -   Some recent data might be hidden     General: alert, awake, not in acute distress  HEENT: Head: Normal, normocephalic, atraumatic.  Eye: Normal external eye, conjunctiva, lids cornea, CATINA.  Ears: Non-tender.  Nose: Normal external nose, mucus membranes and septum.  Pharynx: Dental Hygiene adequate. Normal buccal mucosa. Normal pharynx.  Neck / Thyroid: Supple, no masses, nodes, nodules or enlargement.  Lymphatics: No abnormally enlarged lymph nodes.  Chest: Normal chest wall and respirations. Clear to auscultation.  Breasts: No palpable breast masses.  No axillary lymphadenopathy.    Heart: S1 S2 RRR, no murmur.   Abdomen: abdomen is soft without significant tenderness, masses, organomegaly or guarding  Extremities: normal strength, tone, and muscle mass.  Right lower extremity is more swollen than the left and tenderness in the right calf.  She reported that right leg is swollen at her baseline but pain is new.  Skin: normal. no rash or  abnormalities  CNS: non focal.      Lab Results    No results found for this or any previous visit (from the past 168 hour(s)).    Imaging    Mammo Diagnostic Right    Result Date: 11/2/2018  MAMMO DIAGNOSTIC 2D RIGHT , US BREAST LIMITED (FOCAL) RIGHT 11/2/2018 2:01 PM INDICATION: Patient identifies lump medial aspect of right breast as well as inferiorly. Tenderness at both sites. COMPARISON: Patient reports recent prior exams as well as biopsies at outside institutions including [facility] (but not in [place]). She reports most recent bilateral screening exam performed elsewhere May 2018. Most recent available exam from 05/17/2017 from [facility]. MAMMOGRAPHIC FINDINGS: Right full-field digital diagnostic mammograms performed. The breasts are heterogeneously dense, which may obscure small masses. BBs placed at the two sites of tender lumps. The medial site only projects over adipose tissue. The second site is just inferior of nipple and corresponds to a small area of adipose tissue or possible skin dimpling. The overall amount of underlying glandular tissue appears to have decreased compared to the previous exam, there is no definitive mass or etiology for potential skin dimpling. Images evaluated with the assistance of CAD. ULTRASOUND FINDINGS: Scans over the medial right breast zone 2 and 3:00 was tender per patient but only normal adipose tissue identified. Scans at 6:00 zone 2, correspond to the second tender nodule and there is a focal area of dimpling. Directly adjacent to the dimple is a tiny ovoid superficial hypoechoic nodule that measures 4 x 2 mm has an appearance most suggestive of benign sebaceous  cyst. No suspicious underlying mass evident.     1.  No mammographic or sonographic evidence for malignancy. However, we will have the patient return in six months to reevaluate the right breast with repeat mammogram and an ultrasound. Are there  new suspicious clinical features along  inferior breast at the site of dimpling? If so, further workup should be based on suspicion of exam. ACR BI-RADS Category 3: Probably Benign.     Us Breast Limited (focal) Right    Result Date: 11/2/2018  MAMMO DIAGNOSTIC 2D RIGHT , US BREAST LIMITED (FOCAL) RIGHT 11/2/2018 2:01 PM INDICATION: Patient identifies lump medial aspect of right breast as well as inferiorly. Tenderness at both sites. COMPARISON: Patient reports recent prior exams as well as biopsies at outside institutions including [facility] (but not in [place]). She reports most recent bilateral screening exam performed elsewhere May 2018. Most recent available exam from 05/17/2017 from [facility]. MAMMOGRAPHIC FINDINGS: Right full-field digital diagnostic mammograms performed. The breasts are heterogeneously dense, which may obscure small masses. BBs placed at the two sites of tender lumps. The medial site only projects over adipose tissue. The second site is just inferior of nipple and corresponds to a small area of adipose tissue or possible skin dimpling. The overall amount of underlying glandular tissue appears to have decreased compared to the previous exam, there is no definitive mass or etiology for potential skin dimpling. Images evaluated with the assistance of CAD. ULTRASOUND FINDINGS: Scans over the medial right breast zone 2 and 3:00 was tender per patient but only normal adipose tissue identified. Scans at 6:00 zone 2, correspond to the second tender nodule and there is a focal area of dimpling. Directly adjacent to the dimple is a tiny ovoid superficial hypoechoic nodule that measures 4 x 2 mm has an appearance most suggestive of benign sebaceous  cyst. No suspicious underlying mass evident.     1.  No mammographic or sonographic evidence for malignancy. However, we will have the patient return in six months to reevaluate the right breast with repeat mammogram and an ultrasound. Are there  new suspicious clinical  features along inferior breast at the site of dimpling? If so, further workup should be based on suspicion of exam. ACR BI-RADS Category 3: Probably Benign.     TT: 40 minutes and more than 50% was spent on coordination and counseling of care.    Signed by: Devon Suarez MD

## 2021-06-21 NOTE — LETTER
Letter by Paola Rose MBBS at      Author: Paola Rose MBBS Service: -- Author Type: --    Filed:  Encounter Date: 11/18/2020 Status: (Other)         Patient: Irene León   MR Number: [id number]   YOB: 1945   Date of Visit: 11/18/2020       [facility] Admission note      Patient: Irene León  MRN: [id number]  Date of Service: 11/18/2020      [facility] [[id number]]  Reason for Visit     Chief Complaint   Patient presents with   ? Review Of Multiple Medical Conditions   F/U PAIN /MUSCLE SPASMS/ chf    Code Status     FULL CODE    Assessment     -Acute CHF exacerbation with persistent weight gain in spite of being on a higher dose of Lasix; now improved  -Ongoing concerns about cough and congestion with hypoxic respiratory failure  - pain management  -T12 compression fracture  -ANATOLIY with elevated creatinine 1.4 in the TCU  -Osteoporosis restarted on Prolia  -Pain management no longer on narcotics; no discharge on narcotics including Percocet  -Acute metabolic encephalopathy/delirium felt to be secondary to opioid medication  -CHF with reduced ejection fraction acute exacerbation  -- ANATOLIY /cardiorenal syndrome  -History of COPD and asthma  -Acute on chronic hypoxic respiratory failure ON 3L BY NC  -Peripheral arterial disease  -History of factor V Leiden and occipital stroke on Xarelto  -History of breast cancer  -Concern for mild dementia with baseline cognitive impairment  Slums 13/30  -Generalized weakness      Plan     Patient has been admitted to the TCU for strengthening and rehab  Patient continues to have issues with pain management with intractable low back and hip pain.  She has done a follow-up with orthopedics and has been given a referral for kyphoplasty.  She is also requesting that her back brace be adjusted because it is causing her significant distress and pain.  However she is now grudgingly admitted that her pain may be now under better  control.  However she still does not want her narcotics tapered.  Will await her follow-up appointment with orthopedics for kyphoplasty before discontinuation of medication or tapering her.  Weights have been stable at around 144 pounds and she continues on a lower regimen of Bumex.  Recheck BMP has been stable and her CHF issues have resolved nicely.  Recheck BMP today shows stable electrolytes improvement in creatinine to 1.1  Continue with her baseline oxygen needs    History     Patient is a very pleasant 75 y.o. female who is admitted to TCU.  Admitted with a T12 compression fracture  She has been discharged weightbearing as tolerated with a TLSO brace to be worn all day  She did have a follow-up with orthopedics.  Based on the recommendation she was given an appointment to follow-up with orthopedics for kyphoplasty and injection in her piriformis.  Her daughter is trying to schedule it.  Currently she is awaiting that appointment  She is compliant with her TLSO bracing.  Call has been made out to her brace company to adjusted as she feels part of her pain also stems from improper fitting brace.  Pain management reviewed and she did admit today that she feels her pain is slowly improving however she is still not ready for any narcotic taper.  She also had recent exacerbation of CHF for which she was given high doses of Bumex.  BMPs have been stable she is reporting her cough and congestion have improved.  Now down to 142 pounds which is almost close to 8 pound weight loss  She is back to her baseline oxygen needs which is 2 to 3 L.  Cognitively she has some impairment but overall has been stable she appears to be mildly forgetful.  Physically remains quite debilitated and wheelchair-bound      Past Medical History     Active Ambulatory (Non-Hospital) Problems    Diagnosis   ? Pain due to fracture   ? High risk medication use   ? Heart failure exacerbated by sotalol (H)   ? T12 compression fracture (H)   ? Acute  on chronic diastolic congestive heart failure (H)   ? Metabolic encephalopathy   ? Low back pain   ? Compression fracture of L1 vertebra, sequela   ? Chronic respiratory failure with hypoxia (H)   ? Chronic obstructive pulmonary disease, unspecified COPD type (H)   ? Chronic systolic congestive heart failure (H)   ? Hypoxia   ? S/P hip replacement, left   ? Hip pain, left   ? Acute hypoxemic respiratory failure (H)   ? Chest pain   ? COPD exacerbation (H)   ? Herpes zoster without complication   ? Chest wall pain   ? Benign essential hypertension   ? Dyslipidemia   ? Acute respiratory failure with hypoxia (H)   ? Pulsatile tinnitus of both ears   ? Hot flashes due to tamoxifen   ? Pleural nodules   ? Hot flashes, menopausal   ? Malignant neoplasm of central portion of left female breast (H)   ? CKD (chronic kidney disease) stage 3, GFR 30-59 ml/min   ? Dyspnea   ? Insomnia   ? Leg pain, left   ? Femur fracture (H)   ? GERD (gastroesophageal reflux disease)   ? Post menopausal syndrome   ? OP (osteoporosis)   ? Hypertension   ? Pulmonary emphysema, unspecified emphysema type (H)   ? Hyperlipidemia   ? Centrilobular emphysema (H)   ? Anisocoria   ? OAB (overactive bladder)   ? Factor 5 Leiden mutation, heterozygous (H)   ? Family history of blood disease   ? Bladder incontinence   ? Physical deconditioning   ? Cardiomyopathy, idiopathic (H)   ? Depression with anxiety   ? Osteoarthritis of hip   ? Herniated intervertebral disc     Past Medical History:   Diagnosis Date   ? Arrhythmia    ? Breast cancer (H) 2017   ? CHF (congestive heart failure) (H)    ? COPD (chronic obstructive pulmonary disease) (H)    ? Coronary artery disease    ? GERD (gastroesophageal reflux disease)    ? Hx of radiation therapy 2017   ? Hyperlipidemia    ? Hypertension    ? Idiopathic cardiomyopathy (H)        Past Social History     Reviewed, and she  reports that she quit smoking about 13 years ago. She quit smokeless tobacco use about 16  years ago. She reports that she does not drink alcohol or use drugs.    Family History     Reviewed, and family history includes Breast cancer in her maternal aunt; Osteoporosis in an other family member; Prostate cancer in her brother and maternal uncle.    Medication List   Post Discharge Medication Reconciliation Status: discharge medications reconciled, continue medications without change   Current Outpatient Medications on File Prior to Visit   Medication Sig Dispense Refill   ? acetaminophen (TYLENOL) 325 MG tablet Take 2 tablets (650 mg total) by mouth 3 (three) times a day. (Patient taking differently: Take 650 mg by mouth 4 (four) times a day. )  0   ? ADVAIR DISKUS 500-50 mcg/dose DISKUS USE 1 INHALATION TWICE A DAY 60 each 11   ? albuterol (PROAIR HFA;PROVENTIL HFA;VENTOLIN HFA) 90 mcg/actuation inhaler Inhale 2 puffs every 4 (four) hours as needed for wheezing. 1 Inhaler 0   ? ascorbic acid, vitamin C, (VITAMIN C) 1000 MG tablet Take 1,000 mg by mouth daily. Airborne 1 tab daily     ? aspirin 81 MG EC tablet Take 81 mg by mouth daily.     ? azelastine (ASTELIN) 137 mcg (0.1 %) nasal spray 2 sprays into each nostril daily.     ? benzonatate (TESSALON) 200 MG capsule Take 1 capsule (200 mg total) by mouth 3 (three) times a day as needed for cough. 90 capsule 2   ? celecoxib (CELEBREX) 200 MG capsule Take 200 mg by mouth 2 (two) times a day.     ? chlorhexidine (PERIDEX) 0.12 % solution Apply 15 mL to the mouth or throat at bedtime as needed.      ? cholecalciferol, vitamin D3, 1,000 unit tablet Take 1,000 Units by mouth daily.     ? diclofenac sodium (VOLTAREN) 1 % Gel Apply 4 g to area of pain in low back 4 times daily  0   ? EPINEPHrine (EPIPEN/ADRENACLICK/AUVI-Q) 0.3 mg/0.3 mL injection Inject 0.3 mg into the shoulder, thigh, or buttocks.     ? famotidine (PEPCID) 20 MG tablet Take 20 mg by mouth daily.     ? fluticasone (FLONASE) 50 mcg/actuation nasal spray Apply 1 spray into each nostril as needed.       ? furosemide (LASIX) 40 MG tablet Take 1 tablet (40 mg total) by mouth 2 (two) times a day at 9am and 6pm.  0   ? gabapentin (NEURONTIN) 300 MG capsule Take 2 capsules (600 mg total) by mouth 2 (two) times a day. 360 capsule 2   ? HYDROcodone-acetaminophen 5-325 mg per tablet Take 1 tablet by mouth every 4 (four) hours as needed. 90 tablet 0   ? ipratropium (ATROVENT HFA) 17 mcg/actuation inhaler Inhale 2 puffs every 6 (six) hours. 3 Inhaler 3   ? ipratropium-albuterol (DUO-NEB) 0.5-2.5 mg/3 mL nebulizer 1 neb 4 times a day until seen by primary and then as needed or as directed 60 vial 1   ? lidocaine (XYLOCAINE) 5 % ointment Apply to area of pain in low back 4 times daily 35.44 g 0   ? losartan (COZAAR) 50 MG tablet TAKE 1 TABLET DAILY 90 tablet 3   ? melatonin 3 mg Tab tablet Take 1 tablet (3 mg total) by mouth at bedtime.  0   ? metoprolol succinate (TOPROL-XL) 50 MG 24 hr tablet Take 1.5 tablets (75 mg total) by mouth daily. 135 tablet 3   ? mirabegron (MYRBETRIQ) 50 mg Tb24 ER tablet Take 1 tablet (50 mg total) by mouth daily. 90 tablet 3   ? omalizumab (XOLAIR) 150 mg/mL Syrg injection Inject 300 mg under the skin every 14 (fourteen) days.      ? pantoprazole (PROTONIX) 40 MG tablet Take 1 tablet (40 mg total) by mouth daily. 90 tablet 4   ? polyethylene glycol (MIRALAX) 17 gram packet Take 1 packet (17 g total) by mouth daily as needed.  0   ? QUEtiapine (SEROQUEL) 25 MG tablet Take 0.5 tablets (12.5 mg total) by mouth 3 (three) times a day as needed (Agitation that is not redirectable.).  0   ? rivaroxaban ANTICOAGULANT (XARELTO) 20 mg tablet Take 20 mg by mouth at bedtime.     ? senna-docusate (PERICOLACE) 8.6-50 mg tablet Take 1 tablet by mouth daily as needed for constipation.  0   ? simvastatin (ZOCOR) 40 MG tablet Take 1 tablet (40 mg total) by mouth at bedtime. 90 tablet 3   ? sodium chloride (OCEAN) 0.65 % nasal spray 2 sprays into each nostril as needed.     ? solifenacin (VESICARE) 5 MG tablet  Take 1 tablet (5 mg total) by mouth at bedtime. 90 tablet 3   ? tamoxifen (NOLVADEX) 20 MG tablet TAKE 1 TABLET DAILY 90 tablet 4   ? traZODone (DESYREL) 50 MG tablet TAKE 1 TABLET(50 MG) BY MOUTH AT BEDTIME AS NEEDED 90 tablet 2   ? triamcinolone (KENALOG) 0.1 % cream Apply to rash on arms twice daily for one week 30 g 0   ? triamcinolone (KENALOG) 0.5 % ointment Apply o foot twice a day 30 g 1   ? venlafaxine (EFFEXOR-XR) 150 MG 24 hr capsule TAKE 1 CAPSULE DAILY 90 capsule 4     Current Facility-Administered Medications on File Prior to Visit   Medication Dose Route Frequency Provider Last Rate Last Admin   ? denosumab 60 mg (PROLIA 60 mg/ml)  60 mg Subcutaneous Q6 Months Suzanna Shane MD   60 mg at 02/04/20 5812       Allergies     Allergies   Allergen Reactions   ? Sulfa (Sulfonamide Antibiotics) Rash     Headaches and dizziness.   ? Homeopathic Drugs Unknown and Other (See Comments)     Comment: runny nose, Comment: runny nose   ? Mold Extracts    ? Morphine (Pf)      hallucinate   ? Lisinopril Cough, Unknown and Itching     Comment: cough, Comment: cough   ? Sulfacetamide Sodium Rash       Review of Systems   A comprehensive review of 14 systems was done. Pertinent findings noted here and in history of present illness. All the rest negative.  Constitutional: Negative.  Negative for fever, chills, she has activity change, appetite change and fatigue.   HENT: Negative for congestion and facial swelling.    Eyes: Negative for photophobia, redness and visual disturbance.   Respiratory: Negative for cough and chest tightness.    Cardiovascular: Negative for chest pain, palpitations and leg swelling.   Gastrointestinal: Negative for nausea, diarrhea, constipation, blood in stool and abdominal distention.   Genitourinary: Negative.    Musculoskeletal: Negative.  Pain in her low back; today reporting intractable muscle spasms which are not improving  Has piriformis pain  Skin: Negative.    Neurological: Negative  for dizziness, tremors, syncope, weakness, light-headedness and headaches.   Hematological: Does not bruise/bleed easily.   Psychiatric/Behavioral: Negative.  Significant distress due to pain      Physical Exam     Recent Vitals 10/24/2020   Height -   Weight -   BSA (m2) -   /88   Pulse 85   Temp 97.5   Temp src 1   SpO2 92   Some recent data might be hidden     Currently on 2 L of oxygen by nasal cannula wt 142lb  R/C /67  Constitutional: Oriented to person, place, and time and appears well-developed.   HEENT:  Normocephalic and atraumatic.  Eyes: Conjunctivae and EOM are normal. Pupils are equal, round, and reactive to light. No discharge.  No scleral icterus. Nose normal. Mouth/Throat: Oropharynx is clear and moist. No oropharyngeal exudate.    NECK: Normal range of motion. Neck supple. No JVD present. No tracheal deviation present. No thyromegaly present.   CARDIOVASCULAR: Normal rate, regular rhythm and intact distal pulses.  Exam reveals no gallop and no friction rub.  Systolic murmur present.  PULMONARY: Effort normal and breath sounds normal. No respiratory distress.No Wheezing or rales.  ABDOMEN: Soft. Bowel sounds are normal. No distension and no mass.  There is no tenderness. There is no rebound and no guarding. No HSM.  MUSCULOSKELETAL: Normal range of motion. No edema and no tenderness. Mild kyphosis,  tenderness  to palpation on her lower thoracic spine  Mobility is limited.  LYMPH NODES: Has no cervical, supraclavicular, axillary and groin adenopathy.   NEUROLOGICAL: Alert and oriented to person, place, and time. No cranial nerve deficit.  Normal muscle tone. Coordination normal.   GENITOURINARY: Deferred exam.  SKIN: Skin is warm and dry. No rash noted. No erythema. No pallor.   EXTREMITIES: No cyanosis, no clubbing, no edema. No Deformity.  PSYCHIATRIC: Normal mood, affect and behavior.  High anxiety due to pain      Lab Results     Recent Results (from the past 240 hour(s))   COVID-19  Virus PCR MRF    Collection Time: 11/09/20  8:00 AM    Specimen: Respiratory   Result Value Ref Range    COVID-19 VIRUS SPECIMEN SOURCE Nasopharyngeal     2019-nCOV Not Detected    COVID-19 Virus PCR MRF    Collection Time: 11/17/20  7:00 AM    Specimen: Respiratory   Result Value Ref Range    COVID-19 VIRUS SPECIMEN SOURCE Nasopharyngeal     2019-nCOV Not Detected                   VASHTI Sanchez

## 2021-06-21 NOTE — LETTER
Letter by Devon Suarez MD at      Author: Devon Suarez MD Service: -- Author Type: --    Filed:  Encounter Date: 11/19/2020 Status: (Other)                   Office Hours: Monday - Friday 8:00 - 4:30PM    Irene León  [address] [facility] [address] [id number]           November 19, 2020      Dear Irene:    It looks as though you are due to see Dr Suarez in January. If you would like to schedule this please call [phone]       Sincerely,        [facility]

## 2021-06-22 ENCOUNTER — COMMUNICATION - HEALTHEAST (OUTPATIENT)
Dept: INTERNAL MEDICINE | Facility: CLINIC | Age: 76
End: 2021-06-22

## 2021-06-22 NOTE — PROGRESS NOTES
[facility] Hematology and Oncology Progress Note    Patient: Irene León  MRN: [id number]  Date of Service: 1/3/2019      Reason for Visit    Chief Complaint   Patient presents with     HE Cancer     Breast Cancer       Assessment and Plan  Cancer Staging  Malignant neoplasm of central portion of left female breast (H)  Staging form: Breast, AJCC 7th Edition  - Pathologic stage from 7/26/2017: Stage IA (T1b, N0, cM0) - Signed by Devon Suarez MD on 8/1/2017  ER Status: Positive  DE Status: Positive  HER2 Status: Negative      ECOG Performance   ECOG Performance Status: 1     Distress Assessment  Distress Assessment Score: No distress    Pain  Currently in Pain: No/denies    # Stage IA (pT1b, pN0 (sn),cM0) invasive ductal carcinoma of the left breast, grade 1, associated DCIS, ER/DE positive, HER-2 negative, s/p left breast lumpectomy and left axillary sentinel lymph node biopsy. Right breast atypical ductal hyperplasia s/p right breast excisional biopsy on 6/20/2017.  She has several comorbidities including nonischemic cardiomyopathy (EF 40% in 3/17), osteoporosis on treatment with oral bisphosphonate, factor V Leiden mutation (details unknown). Started Tamoxifen in 12/2017.   I shared my discussion with Dr. Guzman from neurology. She is not interested in changing AI and I have some reservation with the use of AI due to her low EF and osteoporosis. Her fear was flaring vasomotor symptoms as she just got a good handle with Venlafaxine now.   We agreed to continue tamoxifen for now. Continue monitoring of thromboembolic events.    Mammogram due in May 2019 (time for follow up right breast and screening for left breast).   Follow-up with me in 6 months.    #. Pulmonary nodules   Several reported on CTA and noted since 2014 but 2 nodules are enlarging.   Will obtain follow up CT in 6 months.    #. Right breast skin dimpling at prior excisional biopsy site   There is no clinical or mammographic evidence of breast  cancer recurrence.  Diagnostic right breast mammo and US did not show definitive mass. Explained that possibly from prior biopsy, but will follow.      #.  Hot flushes   Failed gabapentin. Effexor works for her and doing well at 150 mg daily.    #. Hypertension, controlled.        #.  Osteoporosis  #.  COPD  #.  Idiopathic cardiomyopathy- LVEF 40% in October 2018, no change from prior.     Problem List    1. Multiple lung nodules on CT  CT Chest Abdomen Pelvis With Oral With IV Cont   2. Malignant neoplasm of central portion of left breast in female, estrogen receptor positive (H)  CT Chest Abdomen Pelvis With Oral With IV Cont    Comprehensive Metabolic Panel    HM1(CBC and Differential)      ______________________________________________________________________________    Diagnosis  6/20/17  Stage IA (pT1b, pN0(sn), cM0) invasive ductal carcinoma of the left breast  - ER (high positive, 98%), DC (high positive, 97%) HER2 (negative, score of 1+ by IHC)  - [place] grade 1  - Tumor size: <1 cm  - Margin-negative on final margin with reexcision for invasive carcinoma but close margin for DCIS of 0.2 cm from new medial margin.    - Angiolymphatic invasion present at the inferior and original medial margin.  - 1 sentinel lymph node removed, negative for carcinoma  - associated DCIS  - Right breast atypical ductal hyperplasia.    - Oncotype DX recurrence score 6 : 10 year risk of recurrence with 5 year of tamoxifen is 5%.    Therapy to date:  6/20/17 -right breast excisional biopsy AND left breast lumpectomy, left axillary sentinel lymph node biopsy  6/30/17-reexcision lumpectomy of the left breast  9/8/17-completed adjuvant radiation to the left breast 4256 cGy/16  12/6/17-initiated adjuvant endocrine therapy with tamoxifen.    History of Present Illness    Stephanie is here by herself today.      She was in the hospital couple weeks ago with shingles develop on her left breast and chest.  Associated pain present.  She  noticed significant bruising on Plavix.     Pain Status  Currently in Pain: No/denies    Review of Systems    Constitutional  Constitutional (WDL): Exceptions to WDL  Fatigue: None  Fever: None  Chills: None  Weight Gain: None  Weight Loss: None  Neurosensory  Neurosensory (WDL): Exceptions to WDL  Peripheral Motor Neuropathy: None  Ataxia: None  Peripheral Sensory Neuropathy: None  Confusion: None  Syncope: None  Eye   Eye Disorder (WDL): All eye disorder elements are within defined limits  Ear  Ear Disorder (WDL): Exceptions to WDL  Ear Pain: None  Tinnitus: Mild symptoms, intervention not indicated(can hear heartbeat-seeing Dr. Mccoy)  Cardiovascular  Cardiovascular (WDL): Exceptions to WDL  Palpitations: None  Edema: Yes  Edema Limbs: 5 - 10% inter-limb discrepancy in volume or circumference at point of greatest visible difference, swelling or obscuration of anatomic architecture on close inspection(bilat LE)  Phlebitis: None  Superficial thrombophlebitis: None  Pulmonary  Respiratory (WDL): Exceptions to WDL  Cough: None  Dyspnea: Shortness of breath with moderate exertion  Hypoxia: None  Gastrointestinal  Gastrointestinal (WDL): All gastrointestinal elements are within defined limits  Genitourinary  Genitourinary (WDL): Exceptions to WDL  Urinary Frequency: Present(due to furosemide)  Urinary Retention: None  Urinary Tract Pain: None  Lymphatic  Lymph (WDL): All lymph disorder elements are within defined limits  Musculoskeletal and Connective Tissue  Musculoskeletal and Connetive Tissue Disorders (WDL): All Musculoskeletal and Connetive Tissue Disorder elements are within defined limits  Integumentary  Integumentary (WDL): Exceptions to WDL(bruises easily due to Plavix-bruises to R AC, L arm, left hand)  Alopecia: None  Rash Maculo-Papular: None  Pruritus: None  Urticaria: None  Palmar-Plantar Erythrodysesthesia Syndrome: None  Flushing: None  Patient Coping  Patient Coping:  Accepting;Open/discussion  Distress Assessment  Distress Assessment Score: No distress  Accompanied by  Accompanied by: Family Member(, Al)  Oral Chemo Adherence       Past History  Past Medical History:   Diagnosis Date     Arrhythmia      Breast cancer (H)      CHF (congestive heart failure) (H)      COPD (chronic obstructive pulmonary disease) (H)      Coronary artery disease      GERD (gastroesophageal reflux disease)      Hyperlipidemia      Hypertension      Idiopathic cardiomyopathy (H)        Physical Exam    Recent Vitals 1/3/2019   Weight -   BSA (m2) -   BP -   Pulse -   Temp -   Temp src -   SpO2 94   Some recent data might be hidden     General: alert, awake, not in acute distress  HEENT: Head: Normal, normocephalic, atraumatic.  Eye: Normal external eye, conjunctiva, lids cornea, CATINA.  Ears: Non-tender.  Nose: Normal external nose, mucus membranes and septum.  Pharynx: Dental Hygiene adequate. Normal buccal mucosa. Normal pharynx.  Neck / Thyroid: Supple, no masses, nodes, nodules or enlargement.  Lymphatics: No abnormally enlarged lymph nodes.  Chest: Normal chest wall and respirations. Clear to auscultation.  Breasts: not completed today due to discomfort from shingles.    Heart: S1 S2 RRR, no murmur.   Abdomen: abdomen is soft without significant tenderness, masses, organomegaly or guarding  Extremities: normal strength, tone, and muscle mass.  Right lower extremity is more swollen than the left and tenderness in the right calf.  She reported that right leg is swollen at her baseline but pain is new.  Skin: normal. no rash or abnormalities  CNS: non focal.      Lab Results    Recent Results (from the past 168 hour(s))   ECG 12 lead nursing unit performed   Result Value Ref Range    SYSTOLIC BLOOD PRESSURE 178 mmHg    DIASTOLIC BLOOD PRESSURE 95 mmHg    VENTRICULAR RATE 73 BPM    ATRIAL RATE 73 BPM    P-R INTERVAL 134 ms    QRS DURATION 126 ms    Q-T INTERVAL 456 ms    QTC CALCULATION (BEZET)  502 ms    P Axis 57 degrees    R AXIS -64 degrees    T AXIS 106 degrees    MUSE DIAGNOSIS       Normal sinus rhythm  Possible Left atrial enlargement  Left axis deviation  Left bundle branch block  Abnormal ECG  When compared with ECG of 18-APR-2016 08:57,  Premature ventricular complexes are no longer Present  T wave inversion more evident in Lateral leads  Confirmed by EMILIO ILM MD LOC: ([id number]) on 12/31/2018 3:05:51 PM     Basic Metabolic Panel   Result Value Ref Range    Sodium 139 136 - 145 mmol/L    Potassium 4.1 3.5 - 5.0 mmol/L    Chloride 107 98 - 107 mmol/L    CO2 21 (L) 22 - 31 mmol/L    Anion Gap, Calculation 11 5 - 18 mmol/L    Glucose 88 70 - 125 mg/dL    Calcium 9.0 8.5 - 10.5 mg/dL    BUN 13 8 - 28 mg/dL    Creatinine 1.08 0.60 - 1.10 mg/dL    GFR MDRD Af Amer 60 (L) >60 mL/min/1.73m2    GFR MDRD Non Af Amer 50 (L) >60 mL/min/1.73m2   HM1 (CBC with Diff)   Result Value Ref Range    WBC 9.1 4.0 - 11.0 thou/uL    RBC 4.95 3.80 - 5.40 mill/uL    Hemoglobin 14.1 12.0 - 16.0 g/dL    Hematocrit 43.4 35.0 - 47.0 %    MCV 88 80 - 100 fL    MCH 28.5 27.0 - 34.0 pg    MCHC 32.5 32.0 - 36.0 g/dL    RDW 14.5 11.0 - 14.5 %    Platelets 219 140 - 440 thou/uL    MPV 9.1 8.5 - 12.5 fL    Neutrophils % 66 50 - 70 %    Lymphocytes % 21 20 - 40 %    Monocytes % 11 (H) 2 - 10 %    Eosinophils % 2 0 - 6 %    Basophils % 1 0 - 2 %    Neutrophils Absolute 6.0 2.0 - 7.7 thou/uL    Lymphocytes Absolute 1.9 0.8 - 4.4 thou/uL    Monocytes Absolute 1.0 (H) 0.0 - 0.9 thou/uL    Eosinophils Absolute 0.1 0.0 - 0.4 thou/uL    Basophils Absolute 0.1 0.0 - 0.2 thou/uL   HM2(CBC w/o Differential)   Result Value Ref Range    WBC 5.7 4.0 - 11.0 thou/uL    RBC 4.87 3.80 - 5.40 mill/uL    Hemoglobin 13.9 12.0 - 16.0 g/dL    Hematocrit 43.0 35.0 - 47.0 %    MCV 88 80 - 100 fL    MCH 28.5 27.0 - 34.0 pg    MCHC 32.3 32.0 - 36.0 g/dL    RDW 14.4 11.0 - 14.5 %    Platelets 206 140 - 440 thou/uL    MPV 9.2 8.5 - 12.5 fL   Basic  Metabolic Panel   Result Value Ref Range    Sodium 139 136 - 145 mmol/L    Potassium 4.3 3.5 - 5.0 mmol/L    Chloride 108 (H) 98 - 107 mmol/L    CO2 22 22 - 31 mmol/L    Anion Gap, Calculation 9 5 - 18 mmol/L    Glucose 186 (H) 70 - 125 mg/dL    Calcium 9.1 8.5 - 10.5 mg/dL    BUN 15 8 - 28 mg/dL    Creatinine 0.96 0.60 - 1.10 mg/dL    GFR MDRD Af Amer >60 >60 mL/min/1.73m2    GFR MDRD Non Af Amer 57 (L) >60 mL/min/1.73m2       Imaging    Cta Chest Pe Run    Result Date: 12/30/2018  CTA CHEST PE RUN 12/30/2018 3:28 PM INDICATION: Pleuritic chest pain pleuritic chest pain TECHNIQUE: Helical acquisition through the chest was performed during the arterial phase of contrast enhancement using IV contrast. 2D and 3D reconstructions were performed by the CT technologist. Dose reduction techniques were used. IV CONTRAST: Iohexol (Omni) 75mL COMPARISON: 10/09/2018, 01/24/2018, 04/22/2014. CT FINDINGS: ANGIOGRAM CHEST: No evidence pulmonary embolism or aortic dissection. There is aortic and coronary atherosclerosis. RV/LV RATIO: N/A LUNGS AND PLEURA: Severe centrilobular emphysema. Larger right minor fissure pleural-based 10 x 8 mm nodule (series 5, image 82). Larger peripheral left lower lobe 4 x 3 mm nodule (image 109) previously measured 4 x 2 mm. No significant change in the left lower lobe subpleural linear thickening (images 80-91). No effusion or infiltrate. MEDIASTINUM: No adenopathy. LIMITED UPPER ABDOMEN: Aortic atherosclerosis. MUSCULOSKELETAL: Negative.     CONCLUSION: 1.  No evidence pulmonary embolism. 2.  Emphysema. 3.  Enlarging right pleural 10 mm and left lung 4 mm nodules. Recommend 6 month CT chest follow-up. NOTE: ABNORMAL REPORT THE DICTATION ABOVE DESCRIBES AN ABNORMALITY FOR WHICH FOLLOW-UP IS NEEDED.     TT: 40 minutes and more than 50% was spent on coordination and counseling of care.    Signed by: Devon Suarez MD

## 2021-06-22 NOTE — PROGRESS NOTES
Patient is currently being seen at an [facility] for primary care. Will not send referral for [facility].

## 2021-06-24 NOTE — PROGRESS NOTES
Assessment/Plan:     1. COPD exacerbation (H)  SPO2 stable.  Finish Prednisone taper.  Continue Advair two times a day and Duonebs four times a day.  Continue follow up with pulmonologist.  - ipratropium-albuterol (DUO-NEB) 0.5-2.5 mg/3 mL nebulizer; 1 neb 4 times a day until seen by primary and then as needed or as directed  Dispense: 60 vial; Refill: 1    2. Cardiomyopathy, idiopathic (H)  Continue with cardiology follow up next month.    3. Chronic systolic heart failure (H)  Continue Lasix.  BMP pending.    - Basic Metabolic Panel    4. History of TIA (transient ischemic attack) and stroke  Patient will discuss her bruising with her neurologist at [facility].  If they agree, recommend switching back over the aspirin.       Subjective:     Irene León is a 74 y.o. female accompanied by her  who presents for hospitalization follow up.  I originally met patient on 2/21 for an annual wellness and establish care visit.  She was previously at [facility].  Patient was noted to have an increased cough at that visit, and was prescribed Doxycycline and Prednisone.  Her symptoms progressed, and she went to the ER on 2/24.  She was diagnosed with COPD exacerbation and Influenza A.  Patient was also found to have a UTI, and was discharged on Levofloxacin.  She has finished this.  Almost done with her prednisone taper.    She was also discharged on Nystatin for thrush.    Patient is feeling much better, but still very tired.  Her breathing is much better and she is not feeling significantly short of breath.  She continues her Advair twice daily and has been doing DuoNebs four times a day.  She needs a refill of these today.  Patient was not discharged on any oxygen.  She has a follow up appointment with her pulmonologist later this month.    History of CHF and idiopathic cardiomyopathy.  Patient has an appointment with cardiology next month, as she has not seen them in some time.  Last EF was 40% this past October.   Denies any LE swelling.  She is on 10 mg Lasix daily.    Patient met with MTM over the phone post discharge.  History of TIA lat year.  Her aspirin was changed to Plavix, but patient has been getting extensive bruising from this.        The following portions of the patient's history were reviewed and updated as appropriate: allergies, current medications.    Review of Systems  A comprehensive review of systems was performed and was otherwise negative    Objective:     BP 98/60   Pulse 97   Temp 97.8  F (36.6  C)   Wt 152 lb 12.8 oz (69.3 kg)   LMP 11/02/1992   SpO2 93%   BMI 28.17 kg/m      General Appearance: Alert, cooperative, no distress, appears stated age  Neck: Supple, symmetrical, trachea midline, no adenopathy  Lungs: Clear to auscultation bilaterally, respirations unlabored. No rhonchi or wheezing.  Heart: Regular rate and rhythm, S1 and S2 normal, no murmur, rub, or gallop. No LE edema.   Abdomen: Soft, non-tender, bowel sounds active all four quadrants,  no masses, no organomegaly  Skin: extensive bruising on arms and legs.    Collette Torres, NIKITA

## 2021-06-24 NOTE — PATIENT INSTRUCTIONS - HE
Call your neurologist about your Plavix - would it be okay to change you over to regular aspirin due to bruising?

## 2021-06-24 NOTE — PROGRESS NOTES
[facility] Transition of Care Encounter  Assessment & Plan                                                     Post Discharge Medication Reconciliation Status: discharge medications reconciled and changed, per note/orders (see AVS)    1. COPD exacerbation  Recently hospitalized and received antibiotic, steroid, as well as Tamiflu for influenza A.  Symptoms have been improving, although she has not started recommended duo nebs 4 times daily as prescription was sent to mail order pharmacy.  Patient states should be arriving today and she will start using.  She does use ICS/LABA inhaler regularly.  Could consider addition of anticholinergic long-acting inhaler in addition.     2. Cardiomyopathy  Blood pressure is well controlled and meeting goal of <140/90 mm Hg per JNC-8 hypertension guidelines. Appropriately on ARB, beta blocker, and diuretic for CHF. Overdue for follow-up with cardiology and does have an appointment scheduled. Blood pressure has been on the low end recently, although patient is asymptomatic.  Upon questioning, determined patient is taking higher than prescribed dose of losartan as she finds it difficult to split the 100 mg tablets in half.  Advised to continue with 100 mg daily for now and have blood pressure rechecked with PCP later this week.  If lower dose is advised, recommend sending a prescription for 50 mg strength tablets to avoid need to split pill.   She is experiencing significant bruising on clopidogrel.  After recent TIA her aspirin was changed to clopidogrel.  Because of her significant reaction which is also affecting her quality of life, recommend considering change back to aspirin. Studies have shown minimal clinical difference in efficacy between aspirin and clopidogrel for secondary prevention.   Plan   1. Based on BP, consider dose reduction in losartan and prescribe lower strength tablet to avoid splitting.   2. Consider change from clopidogrel back to aspirin 81 mg daily.     3.  Dyslipidemia  Appropriately on a statin given history of TIA per ACC/AHA guidelines. Last LDL of 88 mg/dl. Could consider change to high intensity statin based on ASCVD history, although reasonable to continue simvastatin based on her well controlled LDL and age.     4. Neuralgia/Hip pain  Will be restarting gabapentin. Now using lidocaine patches for hip pain, although is using incorrectly and only leaving patch on for 4 hours at a time.  Medication education provided and advised patient to leave patch on for 12 hours and then off for 12 hours before putting on another patch.  She verbalized understanding.   Plan   1. Medication education: Lidoderm patches.     5. Urinary incontinence  Not optimally controlled.  She is on low doses of both Myrbetriq and Vesicare with room to increase doses for possible added efficacy.  Advised patient discuss with urologist.  Plan   1. Consider dose increase in Myrbetriq and/or Vesicare. F/U with urologist.     6. Osteoporosis  Due for DEXA scan which has been scheduled.  She has been on alendronate, bisphosphonate, greater than 5 years per chart notes, so would recommend change in therapy.  Based on DEXA results, could consider Prolia.  Plan   1. Consider change from alendronate to Prolia.       Follow Up  PRN with [facility]    Subjective & Objective                                                       Irene León is a 74 y.o. female called for a transitions of care visit. she was discharged from [facility] on 3/3/19 for COPD exacerbation.    Chief Complaint: Hospital discharge med review  Medication Adherence/Access: Good; no issues identified.     Stephanie was recently hospitalized for shortness of breath, COPD exacerbation, and positive for influenza A. Given Tamiflu and doxycycline. Due to slow improvement, antibiotic was changed to Levaquin and symptoms quickly improved. She was discharged on Levaquin to take 750 mg every other day for 2 doses based on renal function. She was also  given short prednisone taper and told to use home DuoNebs four times a day; she hasn't started it yet because it was sent to [facility] - should be arriving today. Initially, the prednisone was making her sick, but has gotten better. Does take with food. Overall, feeling a bit better, but still having a cough and weakness.   She uses Advair 500/50 mcg 1 puff two times a day. Rinses mouth out afterwards. She has albuterol inhaler to use as needed, although typically uses infrequently. She does not use oxygen.     She does have a history of cardiomyopathy. Last EF of 40%, but hasn't seen cardiology in 2 years. Follow up scheduled. Also history of TIA recently. She is on losartan, metoprolol XL, and furosemide 10 mg every morning. She has a bottle of losartan 100 mg and has trouble splitting in half so has been taking a full pill. She is also on clopidogrel and simvastatin. She notes significant bruising on clopidogrel. This was started in place of aspirin after recent TIA. No other symptoms of bleeding.     She takes alendronate 70 mg every week. Reviewing chart notes, it appears she has been on this for over 5 years. She will be having follow up DEXA scan soon.     For urinary incontinence, she takes both Vesicare 5 mg and Myrbetriq 25 mg. Not finding these particularly effective. She says her symptoms of incontinence have been out of control lately. Seeing urology.     History of breast cancer - diagnosed about 2 years ago.  On Tamoxifen.  Follows with oncology. On Effexor for vasomotor symptoms - previously on gabapentin for this. She was recently treated for shingles and finished antiviral medication. She was still having neuralgia, so PCP recommended retrial of gabapentin. She hasn't started yet. She did tolerate in the past, although did have some sedation at first. She started using Lidoderm patch for hip pain. She keeps on for 4 hours at a time.   She also takes trazodone 50 mg at bedtime as needed.        PMH: reviewed in EPIC   Allergies/ADRs: reviewed in EPIC   Alcohol: reviewed in EPIC   Tobacco:   Social History     Tobacco Use   Smoking Status Former Smoker     Last attempt to quit: 10/28/2007     Years since quittin.3   Smokeless Tobacco Former User     Quit date: 2004     Recent Vitals:   BP Readings from Last 3 Encounters:   19 112/56   19 98/58   19 125/86      Wt Readings from Last 3 Encounters:   19 154 lb 9.6 oz (70.1 kg)   19 152 lb 14.4 oz (69.4 kg)   18 152 lb (68.9 kg)     ----------------    Much or all of the text in this note was generated through the use of Dragon Dictate voice-to-text software. Errors in spelling or words which seem out of context are unintentional. Sound alike errors, in particular, may have escaped editing.    The patient was given a summary of these recommendations via [employer]    I spent 45 minutes with this patient today;  . All changes were made via collaborative practice agreement with Collette Torres NP. A copy of the visit note was provided to the patient's provider.     Anabela Houser, PharmD, BCACP  Medication Management Pharmacist  [facility]          Current Outpatient Medications   Medication Sig Dispense Refill     acetaminophen (TYLENOL) 500 MG tablet Take 1-2 tablets (500-1,000 mg total) by mouth every 6 (six) hours as needed.  0     alendronate (FOSAMAX) 70 MG tablet Take 70 mg by mouth every 7 days. Take in the morning on an empty stomach with a full glass of water 30 minutes before food on Saturday       ascorbic acid, vitamin C, (VITAMIN C) 1000 MG tablet Take 1,000 mg by mouth daily.       azelastine (ASTELIN) 137 mcg (0.1 %) nasal spray 2 sprays into each nostril daily.       cetirizine (ZYRTEC) 10 MG tablet Take 10 mg by mouth daily.              chlorhexidine (PERIDEX) 0.12 % solution Apply 15 mL to the mouth or throat at bedtime.              cholecalciferol, vitamin D3,  1,000 unit tablet Take 1,000 Units by mouth daily.       clopidogrel (PLAVIX) 75 mg tablet Take 75 mg by mouth daily.              cyanocobalamin, vitamin B-12, (VITAMIN B-12 ORAL) Take 1,000 mcg by mouth daily.              fluticasone (FLONASE) 50 mcg/actuation nasal spray Apply 1 spray into each nostril at bedtime.       fluticasone-salmeterol (ADVAIR) 500-50 mcg/dose DISKUS Inhale 1 puff 2 (two) times a day.       furosemide (LASIX) 20 MG tablet Take 10 mg by mouth daily.              gabapentin (NEURONTIN) 300 MG capsule Take 1 capsule (300 mg total) by mouth 2 (two) times a day. 180 capsule 1     ipratropium-albuterol (DUO-NEB) 0.5-2.5 mg/3 mL nebulizer 1 neb 4 times a day until seen by primary and then as needed or as directed 60 vial 0     levoFLOXacin (LEVAQUIN) 750 MG tablet Take 1 tablet (750 mg total) by mouth every other day for 2 doses. 2 tablet 0     lidocaine (LIDODERM) 5 % Place 1 patch on the skin daily. To left hip 30 patch 1     losartan (COZAAR) 50 MG tablet Take 25 mg by mouth daily.              meclizine (ANTIVERT) 25 mg tablet Take 1 tablet (25 mg total) by mouth 3 (three) times a day as needed for dizziness. 30 tablet 0     metoprolol succinate (TOPROL-XL) 100 MG 24 hr tablet Take 100 mg by mouth at bedtime.              MYRBETRIQ 25 mg Tb24 ER tablet Take 25 mg by mouth daily.              nystatin (MYCOSTATIN) 100,000 unit/mL suspension Take 5 mL (500,000 Units total) by mouth 4 (four) times a day for 7 days. 140 mL 0     pantoprazole (PROTONIX) 40 MG tablet Take 40 mg by mouth daily as needed.              predniSONE (DELTASONE) 10 mg tablet 4 tablets daily for 2 days, then 2 tablets daily for 3 days, then 1 tablet daily for 3 days, then DC. 17 tablet 0     PROVENTIL HFA 90 mcg/actuation inhaler Inhale 1 puff every 6 (six) hours as needed.        simvastatin (ZOCOR) 40 MG tablet Take 40 mg by mouth bedtime.       solifenacin (VESICARE) 10 MG tablet Take 5 mg by mouth at bedtime.               tamoxifen (NOLVADEX) 20 MG tablet Take 20 mg by mouth at bedtime.              traZODone (DESYREL) 50 MG tablet Take 50 mg by mouth at bedtime as needed.              venlafaxine (EFFEXOR XR) 150 MG 24 hr capsule Take 1 capsule (150 mg total) by mouth daily. 90 capsule 1     No current facility-administered medications for this visit.

## 2021-06-24 NOTE — PROGRESS NOTES
Assessment and Plan:       1. Routine health maintenance  Discussed with patient that she may be better served in internal medicine.  She has several chronic illnesses, and sees several specialists.  I am happy to see her, but we may need to transition her to IM if her illnesses progress.      2. Osteoporosis, unspecified osteoporosis type, unspecified pathological fracture presence  Will need to find out how long she has been on Fosamax  - DXA Bone Density Scan; Future    3. Postherpetic neuralgia  May trial restart of this due to   - gabapentin (NEURONTIN) 300 MG capsule; Take 1 capsule (300 mg total) by mouth 2 (two) times a day.  Dispense: 180 capsule; Refill: 1    4. Cardiomyopathy, idiopathic (H)  - Ambulatory referral to Cardiology    5. COPD exacerbation (H)  Increased productive cough with purulent sputum.  Patient is afebrile and SPO2 is >90%.  Will cover with Prednisone x 5 days and Doxycyline two times a day.  Continue Advair and albuterol as needed.  Follow up with any fever or worsening shortness of breath.  - predniSONE (DELTASONE) 20 MG tablet; Take 40 mg by mouth daily for 5 days.  Dispense: 10 tablet; Refill: 0  - doxycycline (VIBRA-TABS) 100 MG tablet; Take 1 tablet (100 mg total) by mouth 2 (two) times a day for 7 days.  Dispense: 14 tablet; Refill: 0    6. Osteoarthritis of left hip, unspecified osteoarthritis type  S/P left hip replacement with continued pain.  Follows with orthopedics.  Prescribed Lidocaine patches.    7. OAB (overactive bladder)  Follows with urology    8. Essential hypertension  Well controlled today.    The patient's current medical problems were reviewed.    I have had an Advance Directives discussion with the patient.  The following are part of a depression follow up plan for the patient:  patient follow-up to return when and if necessary  The following health maintenance schedule was reviewed with the patient and provided in printed form in the after visit summary:    Health Maintenance   Topic Date Due     DXA SCAN  02/03/2010     ZOSTER VACCINES (2 of 3) 12/15/2014     DEPRESSION FOLLOW UP  08/21/2019     FALL RISK ASSESSMENT  02/21/2020     MAMMOGRAM  11/02/2020     ADVANCE DIRECTIVES DISCUSSED WITH PATIENT  02/21/2024     TD 18+ HE  02/12/2026     PNEUMOCOCCAL POLYSACCHARIDE VACCINE AGE 65 AND OVER  Completed     INFLUENZA VACCINE RULE BASED  Completed     PNEUMOCOCCAL CONJUGATE VACCINE FOR ADULTS (PCV13 OR PREVNAR)  Completed     COLONOSCOPY  Discontinued        Subjective:   Chief Complaint: Irene León is an 74 y.o. female here for an Annual Wellness visit.   HPI:   Patient previously followed with family medicine at [facility].  Patient lives at home with her .  She has adult children.      Patient follows with urology for treatment of urinary incontinence.  Currently on Myrbetriq and Vesicare, and does not feel that symptoms are well controlled.  She plans on following up with them in the near future.  No new dysuria or hematuria.     History of idiopathic cardiomyopathy and CHF.  Patient is not sure when this was diagnosed.  She has previously seen cardiology, but not in almost 2 years.  She had an echocardiogram with bubble study in October, which showed an EF of 40%.  This was not significantly changed from previous.  Patient is on Metoprolol, Lasix, and Losartan.  She is on a statin.  Denies any new CP.  Increased shortness of breath (see below).  No new LE edema or significant weight gain.     History of COPD  Patient is a former smoker.  Follows with pulmonology at [facility], and has an appointment scheduled next month  Currently on Advair twice daily and albuterol as needed.  Patient is not on oxygen therapy.  She does have some shortness of breath at baseline.  Currently reports an increase in shortness of breath and cough x 2 days.  She feels like she has a cold.  Associated symptoms include a sore throat.  Cough is productive with some mild wheezing.  No  fevers.  She has increased her use of Albuterol.    Patient follows with neurology due to history of a TIA about 1 month ago.  She is on Plavix and a statin.  Reports bruising from the Plavix.    History osteoporosis on Fosamax.  Patient is unsure how long she has been on this medication.  Last DEXA scan was in 2017.    History of breast cancer - diagnosed about 21 months ago.  On Tamoxifen.  Follows with oncology.  On Effexor for vasomotor symptoms - previously on Gabapentin for this.    Patient recently treated for herpes zoster on the left breast.  Notes continued pain at the left breast/chest wall, but no new lesions.  Finished antiviral medication.    History of left hip replacement.  She has residual left hip pain.  Follows with orthopedics.  Requesting a prescription for Lidocaine patches.      Review of Systems: Please see above.  The rest of the review of systems are negative for all systems.    Patient Care Team:  Collette Torres NP as PCP - General (Family Medicine)  Andra Gonzales MD as Physician (Radiation Oncology)  Lombardi, Susan L, RN as Oncology Nurse Navigator (Hematology and Oncology)  Devon Suarez MD as Physician (Hematology and Oncology)     Patient Active Problem List   Diagnosis     Leg pain, left     Femur fracture (H)     Hypertension     COPD (chronic obstructive pulmonary disease) (H)     Hyperlipidemia     Bladder incontinence     Cardiomyopathy, idiopathic (H)     Depression with anxiety     Family history of blood disease     GERD (gastroesophageal reflux disease)     Post menopausal syndrome     OP (osteoporosis)     Osteoarthritis of hip     Insomnia     Dyspnea     Malignant neoplasm of central portion of left female breast (H)     Pleural nodules     Hot flashes due to tamoxifen     COPD exacerbation (H)     Herpes zoster without complication     Chest wall pain     Benign essential hypertension     Dyslipidemia     Acute respiratory failure with hypoxia (H)      Factor 5 Leiden mutation, heterozygous (H)     CKD (chronic kidney disease) stage 3, GFR 30-59 ml/min (H)     Centrilobular emphysema (H)     Anisocoria     Herniated intervertebral disc     Hot flashes, menopausal     OAB (overactive bladder)     Physical deconditioning     Pulsatile tinnitus of both ears     Chest pain     Acute hypoxemic respiratory failure (H)     Past Medical History:   Diagnosis Date     Arrhythmia      Breast cancer (H)      CHF (congestive heart failure) (H)      COPD (chronic obstructive pulmonary disease) (H)      Coronary artery disease      GERD (gastroesophageal reflux disease)      Hyperlipidemia      Hypertension      Idiopathic cardiomyopathy (H)       Past Surgical History:   Procedure Laterality Date     BREAST LUMPECTOMY Left 06/2017    x2     CARDIAC CATHETERIZATION       CATARACT EXTRACTION Left 2017     PA OPEN TX FEMORAL FRACTURE DISTAL MED/LAT CONDYLE Left 10/28/2015    Procedure: OPEN REDUCTION INTERNAL FIXATION LEFT  PROXIMAL FEMUR PERIPROSTHETIC FRACTURE;  Surgeon: Yovanny Albarran MD;  Location: [facility];  Service: Orthopedics     REVISION TOTAL HIP ARTHROPLASTY Left       Family History   Problem Relation Age of Onset     Osteoporosis Other      Prostate cancer Brother      Breast cancer Maternal Aunt      Prostate cancer Maternal Uncle       Social History     Socioeconomic History     Marital status:      Spouse name: Not on file     Number of children: Not on file     Years of education: Not on file     Highest education level: Not on file   Occupational History     Not on file   Social Needs     Financial resource strain: Not on file     Food insecurity:     Worry: Not on file     Inability: Not on file     Transportation needs:     Medical: Not on file     Non-medical: Not on file   Tobacco Use     Smoking status: Former Smoker     Last attempt to quit: 10/28/2007     Years since quittin.3     Smokeless tobacco: Former User     Quit date:  9/6/2004   Substance and Sexual Activity     Alcohol use: No     Drug use: No     Sexual activity: Not on file   Lifestyle     Physical activity:     Days per week: Not on file     Minutes per session: Not on file     Stress: Not on file   Relationships     Social connections:     Talks on phone: Not on file     Gets together: Not on file     Attends Mu-ism service: Not on file     Active member of club or organization: Not on file     Attends meetings of clubs or organizations: Not on file     Relationship status: Not on file     Intimate partner violence:     Fear of current or ex partner: Not on file     Emotionally abused: Not on file     Physically abused: Not on file     Forced sexual activity: Not on file   Other Topics Concern     Not on file   Social History Narrative     and lives in home with 3 flight of steps      No current facility-administered medications for this visit.      No current outpatient medications on file.     Facility-Administered Medications Ordered in Other Visits   Medication Dose Route Frequency Provider Last Rate Last Dose     acetaminophen suppository 650 mg (TYLENOL)  650 mg Rectal Q4H PRN Joseph Barnard MD         acetaminophen tablet 500-1,000 mg (TYLENOL)  500-1,000 mg Oral Q4H PRN Joseph Barnard MD   1,000 mg at 02/25/19 1711     albuterol inhaler 1 puff (PROAIR HFA;PROVENTIL HFA;VENTOLIN HFA)  1 puff Inhalation Q4H PRN Joseph Barnard MD         albuterol nebulizer solution 2.5 mg (PROVENTIL)  2.5 mg Nebulization Q4H PRN Joseph Barnard MD         azelastine 137 mcg (0.1 %) nasal spray 2 spray (ASTELIN)  2 spray Nasal DAILY Joseph Barnard MD   2 spray at 02/25/19 0940     benzocaine-menthol lozenge 1 lozenge (CEPACOL)  1 lozenge Oral Q2H PRN Yong Junior MD   1 lozenge at 02/25/19 0417     bisacodyl suppository 10 mg (DULCOLAX)  10 mg Rectal Daily PRN Joseph Barnard MD         cetirizine tablet 10 mg (ZyrTEC)  10 mg Oral DAILY  Joseph Barnard MD   10 mg at 02/25/19 0939     clopidogrel tablet 75 mg (PLAVIX)  75 mg Oral DAILY Joseph Barnard MD   75 mg at 02/25/19 0939     codeine-guaiFENesin  mg/5 mL liquid 10 mL (GUAIFENESIN AC)  10 mL Oral Q4H PRN Yenny Linton MD   10 mL at 02/25/19 2029     doxycycline capsule 100 mg (MONODOX)  100 mg Oral BID Joseph Banrard MD   100 mg at 02/25/19 2022     fluticasone 50 mcg/actuation nasal spray 1 spray (FLONASE)  1 spray Each Nare QHS Joseph Barnard MD   1 spray at 02/25/19 2020     fluticasone-salmeterol 500-50 mcg/dose diskus inhaler 1 puff (ADVAIR)  1 puff Inhalation BID Joseph Barnard MD   1 puff at 02/25/19 2020     furosemide tablet 10 mg (LASIX)  10 mg Oral DAILY Joseph Barnard MD   10 mg at 02/25/19 0939     gabapentin capsule 300 mg (NEURONTIN)  300 mg Oral BID Joseph Barnard MD   300 mg at 02/25/19 2023     hydrALAZINE injection 10 mg (APRESOLINE)  10 mg Intravenous Q6H PRN Joseph Barnard MD         ipratropium inhaler 2 puff (ATROVENT HFA)  2 puff Inhalation TID PRN Yong Junior MD   2 puff at 02/25/19 1644     ipratropium-albuterol 0.5-2.5 mg/3 mL nebulizer solution 3 mL (DUO-NEB)  3 mL Nebulization TID - RT Joseph Barnard MD   3 mL at 02/25/19 1959     lidocaine 4 % patch 1 patch  1 patch Transdermal Q24H Yenny Linton MD   1 patch at 02/25/19 1644     losartan tablet 25 mg (COZAAR)  25 mg Oral DAILY Joseph Barnard MD   25 mg at 02/25/19 0939     magnesium hydroxide suspension 30 mL (MILK OF MAG)  30 mL Oral Daily PRN Joseph Barnard MD         meclizine tablet 25 mg (ANTIVERT)  25 mg Oral TID PRN Joseph Barnard MD   25 mg at 02/25/19 2023     oseltamivir capsule 30 mg (TAMIFLU)  30 mg Oral BID Joseph Barnard MD   30 mg at 02/25/19 2100     oxybutynin tablet 5 mg (DITROPAN)  5 mg Oral TID Joseph Barnard MD   5 mg at 02/25/19 2023     pantoprazole EC tablet 40 mg (PROTONIX)  40 mg Oral DAILY  "Joseph Barnard MD   40 mg at 02/25/19 0940     phenol-phenolate sodium 2 spray (CHLORASEPTIC)  2 spray Mouth/Throat Q2H PRN Yenny Linton MD   2 spray at 02/25/19 1504     polyethylene glycol packet 17 g (MIRALAX)  17 g Oral DAILY Joseph Barnard MD         predniSONE tablet 40 mg (DELTASONE)  40 mg Oral DAILY Joseph Barnard MD   40 mg at 02/25/19 0939     simvastatin tablet 40 mg (ZOCOR)  40 mg Oral QHS Joseph Barnard MD   40 mg at 02/25/19 2023     sodium chloride flush 2.5 mL (NS)  2.5 mL Intravenous Line Care Joseph Barnard MD   2.5 mL at 02/25/19 1700     sodium chloride flush 3 mL (NS)  3 mL Intravenous PRN Rafa Machado MD         tamoxifen tablet 20 mg (NOLVADEX)  20 mg Oral QHS Joseph Barnard MD   20 mg at 02/25/19 2023     traZODone tablet 50 mg (DESYREL)  50 mg Oral Bedtime PRN Joseph Barnard MD   50 mg at 02/24/19 2344     venlafaxine 24 hr capsule 150 mg (EFFEXOR-XR)  150 mg Oral DAILY Joseph Barnard MD   150 mg at 02/25/19 0939      Objective:   Vital Signs:   Visit Vitals  BP 98/58   Pulse 81   Temp 97.5  F (36.4  C)   Ht 5' 1.25\" (1.556 m)   Wt 152 lb 14.4 oz (69.4 kg)   LMP 11/02/1992   SpO2 91%   BMI 28.65 kg/m         VisionScreening:  No exam data present     PHYSICAL EXAM  General Appearance: Alert, cooperative, no distress, appears stated age  Eyes: PERRL, conjunctiva/corneas clear, EOM's intact  Ears: Normal TM's and external ear canals, both ears  Nose: Nares normal, septum midline, mucosa normal, no drainage  Throat: Lips, mucosa, and tongue normal; teeth and gums normal  Neck: Supple, symmetrical, trachea midline, no adenopathy;  thyroid: not enlarged, symmetric, no tenderness/mass/nodules; no JVD  Lungs: Clear to auscultation bilaterally, respirations unlabored. Mild expiratory wheezing.  Breasts: No breast masses, tenderness, asymmetry, or nipple discharge.  Heart: Regular rate and rhythm, S1 and S2 normal, no murmur, rub, or " gallop  Abdomen: Soft, non-tender, bowel sounds active all four quadrants,  no masses, no organomegaly  Extremities: Extremities normal, atraumatic, no cyanosis or edema  Skin: Skin color, texture, turgor normal, no rashes or lesions  Lymph nodes: Cervical, supraclavicular, and axillary nodes normal  Neurologic: Normal   Psychologic: appropriate affective, answers all of my questions appropriately. No hallucinations, delusion, or suicidal ideations.      Assessment Results 2/21/2019   Activities of Daily Living 1 - Full function   Instrumental Activities of Daily Living 2-4 - Moderate impairment   Mini Cog Total Score 5   Some recent data might be hidden     A Mini-Cog score of 0-2 suggests the possibility of dementia, score of 3-5 suggests no dementia    Identified Health Risks:     The patient was provided with suggestions to help her develop a healthy physical lifestyle.   She is at risk for lack of exercise and has been provided with information to increase physical activity for the benefit of her well-being.  The patient was counseled and encouraged to consider modifying their diet and eating habits. She was provided with information on recommended healthy diet options.  The patient reports that she has difficulty with activities of daily living. I have asked that the patient make a follow up appointment, so this issue will be further evaluated and addressed.  The patient reports that she has difficulty with instrumental activities of daily living.  She was provided with a list of local organizations that provide support services and advised to make a follow up appointment to address this further.   Information on urinary incontinence and treatment options given to patient.    Time: total time spent with the patient was 40 minutes of which >50% was spent in counseling and coordination of care. Approximately 30 minutes was spent in chart review.     Collette Torres NP

## 2021-06-24 NOTE — TELEPHONE ENCOUNTER
Spoke with Stephanie . She is feeling better since discharge. She has appointments lined up with her PCP and Pulmonologist. She has the COPD Action plan that we discussed during her admission that she will take to her appointments for discussion. She is able to get and take her medications and is compliant in taking them.  Reviewed COPD Action Plan.  Stephanie had no problems or questions for me today.

## 2021-06-24 NOTE — TELEPHONE ENCOUNTER
Spoke with Stephanie, she is doing well, able to exercise, checking her action plan, no questions today. Let her know when we plan on calling again.  Safia Davila, LRT, COPD Educator

## 2021-06-25 NOTE — TELEPHONE ENCOUNTER
Reason for Call:     Noe home care nurse from [facility] calling and requesting home care orders listed below and also has FYI for PCP- Patient has been having left thigh pain for about 48-72 hours.  Noe states the pain is getting intense and patient reports that it feels like cramping.  Patient asked to take Aleve and Noe cautioned against it but patient thought it would help. Noe is hoping somebody could reach out to patient.     Home care orders-   1x week for 3 weeks   Patient was not comfortable with discharge from home care today and would like to extend visits.      Phone Number: [phone]    Best Time: anytime    Can we leave a detailed message on this number?: Yes    Call taken on 5/26/2021 at 2:08 PM by Marlene Silva

## 2021-06-25 NOTE — PROGRESS NOTES
[facility] clinic Follow Up Note    Irene León   76 y.o. female    Date of Visit: 6/2/2021    Chief Complaint   Patient presents with     Cough     Order request for oxygen due to cough persisting x 5 days     Subjective  Stephanie is a 76-year-old female with a history of severe COPD, moderate pulmonary hypertension, systolic congestive heart failure with last echo October 2020 ejection fraction 40% and moderate pulmonary hypertension.    Patient quit smoking in 2007.    Patient also has severe kyphosis with history of T12 compression fracture last October.    Patient was treated for a COPD exacerbation on May 19 with a 5-day course of prednisone 40 mg daily and doxycycline for 10 days.  I did review the chest x-ray from May 19, hyperinflated but no focal infiltrate.    Patient did significantly improve with that treatment in May.    Patient stated that 4 days ago patient had increased cough with increased wheezing and shortness of breath.  It has progressed since then.  She has been using her nebulizer at home 4 times a day.  She still on the Trelegy Ellipta.  No longer on Xolair.    No fever.  She is on oxygen 2 L continuous.    No chest pain or chest pressure.  She does not have a history of ischemic coronary disease.    Denies palpitations.  She is had paroxysmal atrial fibrillation noted in the hospital previously.    She also has a history of occipital stroke and heterozygous factor V Leiden mutation and has been on chronic Xarelto.  No bleeding issues.    Patient continues on daily azithromycin, it appears that was restarted when she was in the hospital in March.  She then had extended TCU stay.    She has a history of long QTc.  EKG reviewed by me from March 9 with a QTC of 516.  Does appear to be chronically in that range.    Patient was seen by cardiology on May 25.  Blood pressure at that time 130/70.  She had been off her losartan.  Labs were drawn with potassium down to 2.9.  Normal sodium.   Her creatinine level had gotten better at 1.6 she does have a past history of chronic renal insufficiency.    Patient has chronic musculoskeletal pain including left hip pain after redo left hip replacement with scar.  She also has multiple osteoarthritis symptoms.  She has been told not to use other NSAIDs.  She has been using Voltaren topically.    Mild to moderate central canal stenosis L4-5.    She has not been on chronic narcotics, has had encephalopathy with narcotics in the past.    Past history of right breast cancer with lumpectomy and radiation in 2017.  May 2020 - mammogram.  No evidence of recurrence, on tamoxifen.    History of chronic anxiety and dysthymia on Effexor and trazodone.    On Protonix daily.    History of overactive bladder and history of UTI in March.    Patient has not been taking a potassium supplement.    No new abdominal complaints or diarrhea.  No falls or injury.    PMHx:    Past Medical History:   Diagnosis Date     ANATOLIY (acute kidney injury) (H)      Allergic rhinitis      Arrhythmia      Atrial fibrillation with RVR (H)      Bacteremia      Breast cancer (H) 2017     Cardiomyopathy (H)      Centrilobular emphysema (H)      CHF (congestive heart failure) (H)      CKD (chronic kidney disease)      COPD (chronic obstructive pulmonary disease) (H)      Coronary artery disease      Depression      Dysphagia      E. coli sepsis (H)      Factor 5 Leiden mutation, heterozygous (H)      GERD (gastroesophageal reflux disease)      Hx of radiation therapy 2017     Hyperlipidemia      Hypertension      Hypokalemia      Hypomagnesemia      Idiopathic cardiomyopathy (H)      OAB (overactive bladder)      Osteoporosis      Sacral insufficiency fracture      Sinusitis      Syncope      Urge incontinence      Vocal cord dysfunction      PSHx:    Past Surgical History:   Procedure Laterality Date     BLADDER SURGERY      bladder interstim with removal     BREAST BIOPSY Right 2017     BREAST  LUMPECTOMY Left 06/2017    x2     CARDIAC CATHETERIZATION       CATARACT EXTRACTION Left 07/18/2017     MI OPEN TX FEMORAL FRACTURE DISTAL MED/LAT CONDYLE Left 10/28/2015    Procedure: OPEN REDUCTION INTERNAL FIXATION LEFT  PROXIMAL FEMUR PERIPROSTHETIC FRACTURE;  Surgeon: Yovanny Albarran MD;  Location: [facility];  Service: Orthopedics     REVISION TOTAL HIP ARTHROPLASTY Left      Immunizations:   Immunization History   Administered Date(s) Administered     COVID-19,PF,Pfizer 03/02/2021, 03/23/2021     Influenza P2l7-12, 01/05/2010     Influenza high dose,seasonal,PF, 65+ yrs 10/30/2014, 10/01/2015, 11/26/2016     Influenza, Seasonal, Inj PF IIV3 11/03/2003     Influenza, inj, historic,unspecified 10/01/2016, 10/01/2018     Influenza,F2W9-64,Pandemic,split,IM 01/05/2010     Influenza,quad,high Dose,PF, 65yr + 10/19/2020, 12/15/2020     Influenza,seasonal, Inj IIV3 11/03/2003, 11/08/2006, 10/24/2007, 11/28/2008, 10/09/2009, 10/22/2010, 01/26/2012, 10/03/2012, 11/01/2013     Influenza,trivalent,im, 65+yrs 09/20/2017, 09/25/2018, 10/10/2019     Pneumo Conj 13-V (2010&after) 05/11/2015     Pneumo Polysac 23-V 11/24/1997, 11/21/2007, 05/17/2017     Td, adult adsorbed, PF 07/20/2004     Tdap 02/12/2016     ZOSTER, LIVE 02/18/2009, 10/20/2014     ZOSTER, RECOMBINANT, IM 07/31/2020       ROS A comprehensive review of systems was performed and was otherwise negative    Medications, allergies, and problem list were reviewed and updated    Exam  BP 90/68   Pulse 88   Wt 126 lb 8 oz (57.4 kg)   LMP 11/02/1992   SpO2 (!) 88%   BMI 25.23 kg/m    Severe kyphosis.  Reduced respiratory excursion and hyperinflation with decreased breath sounds throughout.  She does have some mild expiratory wheezing bilaterally.  I did not hear crackles.  Heart is distant to auscultation with her severe chest hyperinflation.  Pulses regular.  Abdomen soft nontender.  She has trace ankle edema bilaterally.  She is still alert and  oriented.  Not using accessory muscles.    O2 sat 88-90% on 2 L at rest.    Assessment/Plan  1. Cough  Consistent with COPD exacerbation.    I did personally review the chest x-ray, it does appear similar to her previous checks x-ray in May which was read as no infiltrate.  Does appear to be hyperinflated and there is severe kyphosis.  Significant vascular calcification.  - XR Chest 2 Views; Future    2. Pulmonary hypertension (H)  Consistent with her severe COPD and possible sleep apnea with severe kyphosis.  On oxygen 2 L nasal cannula.  Reevaluate oxygen next week.    3. COPD exacerbation (H)  Increased cough and shortness of breath with hypoxia.  Consistent with COPD exacerbation, possibly with weather change over the weekend and allergies.    No clear new infiltrate on chest x-ray but await radiology report.    Stop azithromycin with her history of long QTC.    Start doxycycline which she had tolerated in May.    Restart prednisone but a longer course.  Follow-up with nurse practitioner next week and follow-up with me on June 21.    Continue nebulizers and inhalers as current.  Can increase albuterol use if needed.      - doxycycline (MONODOX) 100 MG capsule; Take 1 capsule (100 mg total) by mouth 2 (two) times a day for 10 days. Patient will be stopping azithromycin  Dispense: 20 capsule; Refill: 0  - predniSONE (DELTASONE) 10 mg tablet; Take 2 tablets, 20 mg tonight.  Take 2 tablets 20 mg twice a day tomorrow.  Take 20 mg, 2 tablets once a day in the morning for 4 more days.  Take 10 mg, 1 tablet in the morning for 4 days, then 1/2 tablet, or 5 mg, for 2 days then stop..  Dispense: 19 tablet; Refill: 0    Patient was referred back to pulmonary medicine as it has been sometime since he seen her pulmonary specialist.    Patient has significant hypoxia even with oxygen at 88-90%.  I did recommend that patient go to the ER today but she declined.  She does wish to initiate the outpatient treatment as above.   Patient was given strong warnings if there is any worsening condition or increasing shortness of breath that she should go directly to the emergency room for immediate evaluation and may need hospitalization.    4. Chronic obstructive pulmonary disease, unspecified COPD type (H)  As above    5. Chronic systolic congestive heart failure (H)  Repeat heart echo scheduled for Letty 10.    Currently off losartan, but goal to restart losartan if blood pressure tolerates.    Currently on furosemide 40 mg twice daily, lower extreme edema appears fairly well controlled and does not appear to be CHF on chest x-ray.    Toprol-XL 50 mg a day    Chronic renal insufficiency, recheck kidney labs today.    She does have the diagnosis of coronary disease although I do not see a ischemic event documented.  She is still on the simvastatin 40 mg.    Was originally diagnosed as a broken heart syndrome when her father  back in .    6. Paroxysmal atrial fibrillation (H)  Toprol-XL.  Xarelto.    7. Long QT interval  I did discuss this with patient.  She was told to stop azithromycin and I would not recommend she restart that.    Patient has a cardiology follow-up appointment on .    8. Hypokalemia  Start potassium supplement.  Increase potassium supplement needed.  Check magnesium level and add magnesium supplement if needed.      - Comprehensive Metabolic Panel  - potassium chloride (KLOR-CON) 10 MEQ CR tablet; Take 1 tablet (10 mEq total) by mouth 2 (two) times a day.  Dispense: 60 tablet; Refill: 2  - Magnesium    9. Encounter for therapeutic drug monitoring    - HM2(CBC w/o Differential)    10. Senile osteoporosis  Patient has been off Prolia.  It was originally started 2020.  History of compression fracture 2020.    Plan to follow-up with Dr. JOYCE for osteoporosis consult at a later date.    History of compression fracture with significant deconditioning, had been at [facility] earlier this year.    11. History of  right breast cancer  Oncology follow-up scheduled for June 29.  Mammogram scheduled for Letty 10.  Tamoxifen is no longer on her medication list.    12. Urinary incontinence, unspecified type  Chronic issue.  No new urinary complaints today.  Refill given.  - solifenacin (VESICARE) 5 MG tablet; Take 1 tablet (5 mg total) by mouth at bedtime.  Dispense: 90 tablet; Refill: 3    History of pyelonephritis with hospitalization in March, treated with ceftriaxone at that time    13. Primary osteoarthritis of both hands  Chronic.  Also chronic left hip pain with scar from redo hip replacement.  Unable to use oral NSAIDs with her chronic renal insufficiency.  - diclofenac sodium (VOLTAREN) 1 % Gel; Apply 1 g topically 3 (three) times a day.  Dispense: 350 g; Refill: 3    Return in 6 days (on 6/8/2021) for Recheck.   Patient Instructions   Stop azithromycin.    Restart doxycycline 100 mg twice a day antibiotic.    Restart prednisone, follow instructions.    Start taking potassium twice a day with your furosemide.    Follow-up in clinic with Amilcar Yanez next week.  See me on June 21 as scheduled.    If you have any worsening shortness of breath, go to the emergency room for immediate evaluation.    Proceed with heart echo on Letty 10 as scheduled.    Continue to use your nebulizer as needed for shortness of breath.  Continue current inhalers.    Continue oxygen 2 L nasal cannula continuous.    Pankaj Montanez MD        Current Outpatient Medications   Medication Sig Dispense Refill     acetaminophen (TYLENOL) 500 MG tablet Take 1,000 mg by mouth 3 (three) times a day.       albuterol (ACCUNEB) 0.63 mg/3 mL nebulizer solution Take 1 ampule by nebulization 4 (four) times a day.       albuterol (PROAIR HFA;PROVENTIL HFA;VENTOLIN HFA) 90 mcg/actuation inhaler Inhale 2 puffs every 4 (four) hours as needed for wheezing. 1 Inhaler 0     ascorbic acid, vitamin C, (VITAMIN C) 1000 MG tablet Take 1,000 mg by mouth daily. Airborne 1 tab daily        ATROVENT HFA 17 mcg/actuation inhaler USE 2 INHALATIONS EVERY 6 HOURS 1 Inhaler 3     azelastine (ASTELIN) 137 mcg (0.1 %) nasal spray 2 sprays into each nostril daily.       benzonatate (TESSALON) 200 MG capsule Take 1 capsule (200 mg total) by mouth 3 (three) times a day as needed for cough. 90 capsule 0     chlorhexidine (PERIDEX) 0.12 % solution Apply 15 mL to the mouth or throat at bedtime as needed.        cholecalciferol, vitamin D3, 1,000 unit tablet Take 1,000 Units by mouth daily.       famotidine (PEPCID) 20 MG tablet Take 20 mg by mouth daily before breakfast.        fluticasone (FLONASE) 50 mcg/actuation nasal spray Apply 1 spray into each nostril 2 (two) times a day as needed for allergies.        fluticasone-umeclidinium-vilanterol (TRELEGY ELLIPTA) 100-62.5-25 mcg DsDv inhaler Inhale 1 Inhalation daily. 1 each 3     furosemide (LASIX) 40 MG tablet Take 1 tablet (40 mg total) by mouth 2 (two) times a day. (Patient taking differently: Take 40 mg by mouth 2 (two) times a day at 9am and 6pm. ) 180 tablet 1     gabapentin (NEURONTIN) 300 MG capsule Take 2 capsules (600 mg total) by mouth 2 (two) times a day. 360 capsule 2     ipratropium-albuteroL (DUO-NEB) 0.5-2.5 mg/3 mL nebulizer Inhale 3 mL 4 (four) times a day.        loperamide (IMODIUM) 2 mg capsule Take 4 mg by mouth as needed for diarrhea (Every 3 hours as needed for Diarrhea).       melatonin 3 mg Tab tablet Take 1 tablet (3 mg total) by mouth at bedtime.  0     metoprolol succinate (TOPROL-XL) 50 MG 24 hr tablet Take 50 mg by mouth at bedtime.       MYRBETRIQ 50 mg Tb24 ER tablet TAKE 1 TABLET DAILY 90 tablet 3     polyethylene glycol (MIRALAX) 17 gram packet Take 1 packet (17 g total) by mouth daily as needed.  0     rivaroxaban ANTICOAGULANT (XARELTO) 15 mg tablet Take 15 mg by mouth daily with supper.       simvastatin (ZOCOR) 40 MG tablet Take 1 tablet (40 mg total) by mouth at bedtime. 90 tablet 3     sodium chloride (OCEAN) 0.65 %  nasal spray 2 sprays into each nostril every 2 (two) hours as needed for congestion.        tamoxifen (NOLVADEX) 20 MG tablet TAKE 1 TABLET DAILY 90 tablet 3     traMADoL (ULTRAM) 50 mg tablet Take 1 tablet (50 mg total) by mouth every 6 (six) hours as needed for pain or severe pain (7-10). 30 tablet 0     venlafaxine (EFFEXOR-XR) 150 MG 24 hr capsule TAKE 1 CAPSULE DAILY 90 capsule 3     [employer]IV[employer] Cheri Use As Directed.       diclofenac sodium (VOLTAREN) 1 % Gel Apply 1 g topically 3 (three) times a day. 350 g 3     doxycycline (MONODOX) 100 MG capsule Take 1 capsule (100 mg total) by mouth 2 (two) times a day for 10 days. Patient will be stopping azithromycin 20 capsule 0     EPINEPHrine (EPIPEN/ADRENACLICK/AUVI-Q) 0.3 mg/0.3 mL injection Inject 0.3 mg into the shoulder, thigh, or buttocks.       potassium chloride (KLOR-CON) 10 MEQ CR tablet Take 1 tablet (10 mEq total) by mouth 2 (two) times a day. 60 tablet 2     predniSONE (DELTASONE) 10 mg tablet Take 2 tablets, 20 mg tonight.  Take 2 tablets 20 mg twice a day tomorrow.  Take 20 mg, 2 tablets once a day in the morning for 4 more days.  Take 10 mg, 1 tablet in the morning for 4 days, then 1/2 tablet, or 5 mg, for 2 days then stop.. 19 tablet 0     predniSONE (DELTASONE) 20 MG tablet Take 40 mg by mouth daily. 10 tablet 0     solifenacin (VESICARE) 5 MG tablet Take 1 tablet (5 mg total) by mouth at bedtime. 90 tablet 3     traZODone (DESYREL) 50 MG tablet Take 50 mg by mouth at bedtime as needed for sleep.        No current facility-administered medications for this visit.      Allergies   Allergen Reactions     Sulfa (Sulfonamide Antibiotics) Rash     Headaches and dizziness.     Homeopathic Drugs Unknown and Other (See Comments)     Comment: runny nose, Comment: runny nose     Mold Extracts      Morphine (Pf)      hallucinate     Lisinopril Cough, Unknown and Itching     Comment: cough, Comment: cough     Sulfacetamide Sodium Rash     Social  History     Tobacco Use     Smoking status: Former Smoker     Quit date: 10/28/2007     Years since quittin.6     Smokeless tobacco: Former User     Quit date: 2004   Substance Use Topics     Alcohol use: No     Drug use: No

## 2021-06-25 NOTE — TELEPHONE ENCOUNTER
Prior Authorization Not Needed     Insurance details: Medicare Part B    Pharmacy details: [id number] - [place], [place] - [address] AT [facility] I called the pharmacy and per the Intern [facility], the medication did process through the patient's Medicare Part B as it is not covered under the patient's Medicare Part D due to it being a nebulizer medication.    I will sign this encounter as nothing more administratively will be done and the medication was covered under the appropriate insurance.

## 2021-06-25 NOTE — TELEPHONE ENCOUNTER
Refill Approved    Rx renewed per Medication Renewal Policy. Medication was last renewed on 06/02/2021 but pt requests a 90 day supply.    Jayda Noguera, [facility] Triage/Med Refill 6/3/2021     Requested Prescriptions   Pending Prescriptions Disp Refills     potassium chloride (KLOR-CON) 10 MEQ CR tablet [Pharmacy Med Name: POTASSIUM CL 10MEQ ER TABLETS] 180 tablet 0     Sig: TAKE 1 TABLET(10 MEQ) BY MOUTH TWICE DAILY       Potassium Supplements Refill Protocol Passed - 6/2/2021  6:01 PM        Passed - PCP or prescribing provider visit in past 12 months       Last office visit with prescriber/PCP: 6/2/2021 Pankaj Montanez MD OR same dept: 6/2/2021 Pankaj Montanez MD OR same specialty: 6/2/2021 Pankaj Montanez MD  Last physical: 1/21/2021 Last MTM visit: Visit date not found   Next visit within 3 mo: Visit date not found  Next physical within 3 mo: Visit date not found  Prescriber OR PCP: Pankaj Montanez MD  Last diagnosis associated with med order: 1. Hypokalemia  - potassium chloride (KLOR-CON) 10 MEQ CR tablet [Pharmacy Med Name: POTASSIUM CL 10MEQ ER TABLETS]; TAKE 1 TABLET(10 MEQ) BY MOUTH TWICE DAILY  Dispense: 180 tablet; Refill: 0    If protocol passes may refill for 12 months if within 3 months of last provider visit (or a total of 15 months).             Passed - Potassium level in last 12 months     Lab Results   Component Value Date    Potassium 3.9 06/02/2021

## 2021-06-25 NOTE — TELEPHONE ENCOUNTER
The patient was informed of the clinician message and verbalized understanding. She scheduled an OV for tomorrow for evaluation of her left leg pain x3 days and verbalized understanding that she must go to the ER immediately if leg pain worsens. Noe was informed of the authorized home care order.

## 2021-06-25 NOTE — TELEPHONE ENCOUNTER
Reason for Call:  Other      Detailed comments:  BERONICA   [facility] calling to inform provider of a 4lb weight gain from 26th to 27th      Phone Number Patient can be reached at: Home number on file [phone] (home)    Best Time: any    Can we leave a detailed message on this number?: Yes    Call taken on 5/27/2021 at 10:39 AM by Laura L Goldberg

## 2021-06-25 NOTE — PROGRESS NOTES
Progress Notes by Gaby Chilel MD at 6/6/2017  2:50 PM     Author: Gaby Chilel MD Service: -- Author Type: Physician    Filed: 6/6/2017  3:42 PM Encounter Date: 6/6/2017 Status: Signed    : Gaby Chilel MD (Physician)           Click to link to [facility]     [facility] HEART CARE NOTE    Thank you, Dr. Henning, for asking the [facility] team to see Ms. Irene León to evaluate nonischemic cardiomyopathy and chronic systolic congestive heart failure.    Assessment/Recommendations   Assessment:    1.  Nonischemic cardiomyopathy, idiopathic, with ejection fraction of 40%.  Recent echocardiogram in March shows no interval decline in LV function.  She is quite hypertensive here today which may be related to ongoing stress of workup for breast cancer but I recommended that we increase her losartan to 100 mg daily as she has noted increased symptoms of exertional dyspnea.  She will continue her other medications as is.  No evidence of volume overload.  With regard to potential upcoming surgery for breast cancer, I see no cardiac contraindication to her proceeding with surgery.  Will need to be careful with volume during and postoperatively as she will have increased tendency to develop congestive failure.  2.  Hypertension, inadequately controlled.  Again will increase dose of losartan to 100 mg daily.  3.  Moderate left carotid stenosis, asymptomatic.  Continue baby aspirin and risk factor modification.  There was suggestion of possible occlusion of the right subclavian artery due to retrograde flow in the right vertebral artery.  I will leave it up to the primary physician whether to pursue any further assessment although she denies any unusual exertional fatigue of her right arm.    Plan:  1.  Increase losartan to 100 mg daily.  Continue all other medications as is  2.  Continue to follow daily weights as well as periodic blood pressure checks  3.  Okay to proceed with surgery as  recommended       History of Present Illness    Ms. Irene León is a 72 y.o. female with history of nonischemic cardiomyopathy, EF 40%, hypertension, COPD and mild coronary artery disease who was recently found to have evidence of left breast carcinoma.  They did recently pursue an MRI of the right breast which also showed a suspicious mass and she underwent needle biopsy of that this morning.  Prior to the finding of possible cancer in the right breast, it appears the plan had been to pursue a lumpectomy on the left and she was coming here today for preoperative evaluation.  She does tell me her blood pressures have been running higher recently, as high as 200/100.  She does note exertional dyspnea, but no chest discomfort.  She denies orthopnea or PND.  She does continue to have some chronic lower extremity edema and occasionally will take an extra dose of Lasix.  She has not noted any weight gain.    ECG (personally reviewed): No ECG today    ECHO (personally reviewed): Recent echo in March showed ejection fraction of 40% which was global.       Physical Examination Review of Systems   Vitals:    06/06/17 1505   BP: (!) 148/92   Pulse: 64   Resp: 16     Body mass index is 30.27 kg/(m^2).  Wt Readings from Last 3 Encounters:   06/06/17 155 lb (70.3 kg)   03/01/17 152 lb (68.9 kg)   02/28/17 152 lb (68.9 kg)     General Appearance:   Awake, Alert, No acute distress.   HEENT:  No scleral icterus; the mucous membranes were pink and moist.   Neck: No cervical bruits or jugular venous distention    Chest: The spine was straight. The chest was symmetric.   Lungs:   Respirations unlabored; the lungs are clear to auscultation. No wheezing   Cardiovascular:    regular rate and rhythm.  S1, S2 normal.  No murmur, gallop or rub    Abdomen:  No organomegaly, masses, bruits, or tenderness. Bowels sounds are present   Extremities:  varicosities over both lower extremities.  No peripheral edema.     Skin: No xanthelasma.  Warm, Dry.   Musculoskeletal: No tenderness.   Neurologic: Mood and affect are appropriate.    General: Weight Gain  Eyes: Visual Distubance  Ears/Nose/Throat: WNL  Lungs: Cough, Shortness of Breath  Heart: WNL  Stomach: WNL  Bladder: Frequent Urination at Night  Muscle/Joints: Joint Pain  Skin: WNL  Nervous System: WNL  Mental Health: Depression     Blood: Easy Bruising     Medical History  Surgical History Family History Social History   Past Medical History:   Diagnosis Date   ? Arrhythmia    ? COPD (chronic obstructive pulmonary disease)    ? Coronary artery disease    ? Hyperlipidemia    ? Hypertension    ? Idiopathic cardiomyopathy     Past Surgical History:   Procedure Laterality Date   ? CARDIAC CATHETERIZATION     ? LA OPEN TX FEMORAL FRACTURE DISTAL MED/LAT CONDYLE Left 10/28/2015    Procedure: OPEN REDUCTION INTERNAL FIXATION LEFT  PROXIMAL FEMUR PERIPROSTHETIC FRACTURE;  Surgeon: Yovanny Albarran MD;  Location: [facility] Main OR;  Service: Orthopedics   ? REVISION TOTAL HIP ARTHROPLASTY Left     Family History   Problem Relation Age of Onset   ? Osteoporosis      Social History     Social History   ? Marital status:      Spouse name: N/A   ? Number of children: N/A   ? Years of education: N/A     Occupational History   ? Not on file.     Social History Main Topics   ? Smoking status: Former Smoker     Quit date: 10/28/2007   ? Smokeless tobacco: Not on file   ? Alcohol use 0.6 oz/week     1 Glasses of wine per week   ? Drug use: No   ? Sexual activity: Not on file     Other Topics Concern   ? Not on file     Social History Narrative     and lives in home with 3 flight of steps          Medications  Allergies   Current Outpatient Prescriptions   Medication Sig Dispense Refill   ? ADVAIR DISKUS 250-50 mcg/dose DISKUS Inhale 1 puff 2 (two) times a day.      ? alendronate (FOSAMAX) 70 MG tablet Take 70 mg by mouth every 7 days. Take in the morning on an empty stomach with a full glass of water  30 minutes before food on Saturday     ? ammonium lactate (AMLACTIN) 12 % cream Apply 1 application topically 2 (two) times a day as needed for dry skin (to feet).     ? aspirin 81 MG EC tablet Take 81 mg by mouth bedtime.      ? cetirizine (ZYRTEC) 10 MG tablet Take 10 mg by mouth bedtime.     ? chlorhexidine (PERIDEX) 0.12 % solution Apply 15 mL to the mouth or throat 2 (two) times a day.     ? cholecalciferol, vitamin D3, 1,000 unit tablet Take 1,000 Units by mouth daily.     ? fesoterodine (TOVIAZ) 8 mg Tb24 Take 8 mg by mouth daily. Also takes vesicare     ? fluticasone (FLONASE) 50 mcg/actuation nasal spray 1 spray into each nostril daily.     ? furosemide (LASIX) 40 MG tablet Take 20 mg by mouth daily.      ? IPRATROPIUM BROMIDE (ATROVENT HFA INHL) Inhale.     ? losartan (COZAAR) 50 MG tablet Take 50 mg by mouth daily.      ? metoprolol succinate (TOPROL-XL) 100 MG 24 hr tablet Take 100 mg by mouth daily.      ? mirtazapine (REMERON) 7.5 MG tablet Take 7.5 mg by mouth.     ? multivitamin therapeutic (THERAGRAN) tablet Take 1 tablet by mouth daily.     ? NON FORMULARY otc turmeric     ? pantoprazole (PROTONIX) 40 MG tablet Take 40 mg by mouth daily.     ? PROVENTIL HFA 90 mcg/actuation inhaler Inhale 1 puff every 6 (six) hours as needed.      ? ranitidine (ZANTAC) 150 MG tablet Take 150 mg by mouth daily.     ? simvastatin (ZOCOR) 40 MG tablet Take 40 mg by mouth bedtime.     ? solifenacin (VESICARE) 10 MG tablet Take 5 mg by mouth daily as needed. Also takes Toviaz     ? traMADol (ULTRAM) 50 mg tablet Take 50 mg by mouth every 8 (eight) hours as needed for pain.     ? vitamin E 400 UNIT capsule Take 400 Units by mouth daily.     ? albuterol (PROVENTIL) 2.5 mg /3 mL (0.083 %) nebulizer solution Take 2.5 mg by nebulization every 6 (six) hours as needed for wheezing.     ? amoxicillin-clavulanate (AUGMENTIN) 875-125 mg per tablet Take 1 tablet by mouth 2 (two) times a day.     ? arformoterol (BROVANA) 15 mcg/2  mL Nebu Take 15 mcg by nebulization 2 (two) times a day.     ? desoximetasone (TOPICORT) 0.25 % cream Apply topically 2 (two) times a day.     ? estrogen, conjugated,-medroxyPROGESTERone (PREMPRO) 0.625-2.5 mg per tablet Take 1 tablet by mouth see administration instructions. Every 3 days     ? furosemide (LASIX) 20 MG tablet Take 20 mg by mouth daily.      ? traZODone (DESYREL) 50 MG tablet Take 50 mg by mouth bedtime.       No current facility-administered medications for this visit.       Allergies   Allergen Reactions   ? Sulfa (Sulfonamide Antibiotics) Rash     Headaches and dizziness.   ? Homeopathic Drugs Unknown     Comment: runny nose, Comment: runny nose   ? Mold Extracts    ? Morphine (Pf)      hallucinate   ? Lisinopril Cough, Unknown and Itching     Comment: cough, Comment: cough   ? Sulfacetamide Sodium Rash         Lab Results    Chemistry/lipid CBC Cardiac Enzymes/BNP/TSH/INR   Lab Results   Component Value Date    CREATININE 1.26 (H) 04/07/2016    BUN 29 (H) 04/07/2016    K 3.9 04/07/2016     04/07/2016     04/07/2016    CO2 30 04/07/2016    Lab Results   Component Value Date    WBC 11.1 (H) 03/30/2016    HGB 14.6 03/30/2016    HCT 44.8 03/30/2016    MCV 84 03/30/2016     03/30/2016    Lab Results   Component Value Date    CKMB <1 08/05/2011    TROPONINI 0.04 03/30/2016    BNP 45 04/07/2016    TSH 0.57 11/01/2015    INR 1.76 (H) 11/02/2015

## 2021-06-25 NOTE — TELEPHONE ENCOUNTER
RN cannot approve Refill Request    RN can NOT refill this medication RX was sent on 06/02/2021 for 1 year supply.. Last office visit: 6/2/2021 Pankaj Montanez MD Last Physical: 1/21/2021 Last MTM visit: Visit date not found Last visit same specialty: 6/2/2021 Pankaj Montanez MD.  Next visit within 3 mo: Visit date not found  Next physical within 3 mo: Visit date not found      Jayda Noguera, [facility] Triage/Med Refill 6/7/2021    Requested Prescriptions   Pending Prescriptions Disp Refills     solifenacin (VESICARE) 5 MG tablet [Pharmacy Med Name: SOLIFENACIN SUCCINATE TABS 5MG] 90 tablet 3     Sig: TAKE 1 TABLET AT BEDTIME       Urinary Incontinence Medications Refill Protocol Passed - 6/7/2021  3:00 AM        Passed - PCP or prescribing provider visit in past 12 months       Last office visit with prescriber/PCP: 6/2/2021 Pankaj Montanez MD OR same dept: 6/2/2021 Pankaj Montanez MD OR same specialty: 6/2/2021 Pankaj Montanez MD  Last physical: 1/21/2021 Last MTM visit: Visit date not found   Next visit within 3 mo: Visit date not found  Next physical within 3 mo: Visit date not found  Prescriber OR PCP: Pankaj Montanez MD  Last diagnosis associated with med order: 1. Urinary incontinence, unspecified type  - solifenacin (VESICARE) 5 MG tablet [Pharmacy Med Name: SOLIFENACIN SUCCINATE TABS 5MG]; TAKE 1 TABLET AT BEDTIME  Dispense: 90 tablet; Refill: 3    If protocol passes may refill for 12 months if within 3 months of last provider visit (or a total of 15 months).

## 2021-06-25 NOTE — TELEPHONE ENCOUNTER
Received fax from [employer] stating that ipratropium-albuteroL (DUO-NEB) 0.5-2.5 mg/3 mL nebulizer is not covered, I do see however that the last time this was refilled was sent to [facility]? Please let us know if you are wanting to start a prior authorization or not.

## 2021-06-25 NOTE — TELEPHONE ENCOUNTER
Patient cannot take Aleve.  Patient does not have normal kidney function, and risk for kidney failure with taking Aleve.    Have patient get evaluated for the leg pain.  Have patient get an appointment, or go to emergency room if pain is severe.    Okay for the other home care orders.

## 2021-06-26 NOTE — PATIENT INSTRUCTIONS - HE
Go to the [facility] pharmacy and fill your prescription for the albuterol ipratropium nebulizer treatments.  Do these 4 times daily until your breathing improves.    Do not do the regular albuterol nebulizer treatments.    Continue on prednisone.    Continue on doxycycline.    No other changes to medications today.    Wear your oxygen.  Do not take this off.    Follow-up with Dr. Darling later this month.    Follow-up with pulmonology as planned.

## 2021-06-26 NOTE — PATIENT INSTRUCTIONS - HE
Stop azithromycin.    Restart doxycycline 100 mg twice a day antibiotic.    Restart prednisone, follow instructions.    Start taking potassium twice a day with your furosemide.    Follow-up in clinic with Amilcar Yanez next week.  See me on June 21 as scheduled.    If you have any worsening shortness of breath, go to the emergency room for immediate evaluation.    Proceed with heart echo on Letty 10 as scheduled.    Continue to use your nebulizer as needed for shortness of breath.  Continue current inhalers.    Continue oxygen 2 L nasal cannula continuous.

## 2021-06-26 NOTE — PROGRESS NOTES
"    Assessment & Plan     COPD exacerbation (H)  Still short of breath but responding well to the doxycycline and prednisone prescribed by her PCP last week.  I am going to switch her albuterol neb to albuterol ipratropium.  She has follow-up scheduled with pulmonology in July.  Continue Trelegy.  No longer using azithromycin  - ipratropium-albuteroL (DUO-NEB) 0.5-2.5 mg/3 mL nebulizer  Dispense: 120 vial; Refill: 1    Rib pain on left side  Severe rib pain/back pain unresponsive to OTC pain medications.  She understands the risks associated to tramadol, including cardiac arrhythmias and oversedation.  - traMADoL (ULTRAM) 50 mg tablet  Dispense: 30 tablet; Refill: 0    Chronic systolic congestive heart failure (H)  We stopped her losartan at her last appointment with me due to soft blood pressures and worsening creatinine.  Her creatinine had come down at her last appointment with her PCP.    Essential hypertension, benign  Blood pressure 137/64 today.  Going to hold off on restarting her losartan until she follows up with cardiology or her PCP for further discussion on this.  No longer having orthostasis type symptoms    Patient Instructions   Go to the [facility] pharmacy and fill your prescription for the albuterol ipratropium nebulizer treatments.  Do these 4 times daily until your breathing improves.    Do not do the regular albuterol nebulizer treatments.    Continue on prednisone.    Continue on doxycycline.    No other changes to medications today.    Wear your oxygen.  Do not take this off.    Follow-up with Dr. Darling later this month.    Follow-up with pulmonology as planned.        Review of external notes as documented in note  24 minutes spent on the date of the encounter doing chart review, history and exam, documentation and further activities per the note       BMI:   Estimated body mass index is 25.23 kg/m  as calculated from the following:    Height as of 5/19/21: 4' 11.37\" (1.508 m).    Weight as " of 6/2/21: 126 lb 8 oz (57.4 kg).       Return in about 2 weeks (around 6/22/2021).    Aakash Flores CNP  [facility]   Irene León is 76 y.o. and presents today for the following health issues   HPI     Patient comes the clinic today for follow-up.    She was seen by her PCP last week for a COPD exacerbation.    She had her prednisone extended to a taper and had her doxycycline refilled at that appointment.    Today, she is a bit short of breath but says that the heat and humidity have been very problematic for her (as of the time of this writing, there may end up being an 9-day streak of 90+ degree temperatures).    She is wearing her oxygen at home.    No longer having orthostasis lightheaded dizzy spells after discontinuing losartan.    Made an appointment with pulmonology but not until July.    Seeing her PCP at the end of this month.    Since restarting prednisone, she has not noticed any increased swelling in her lower extremities.  Says her breathing is a bit better.    She continues to have severe back pain and rib pain.  She has a history of severe kyphosis with T12 compression fracture last October.  She had a planned vertebroplasty but canceled that.  Using tramadol as needed for severe pain not, along with Tylenol.    She cannot use NSAIDs due to history of renal insufficiency      Review of Systems  Negative      Objective    /64   Pulse 66   LMP 11/02/1992   SpO2 93% Comment: on oxygen  There is no height or weight on file to calculate BMI.  Physical Exam  Wheezing with auscultation of bilateral lung fields.  No edema in bilateral lower extremities.  O2 nasal cannula (3 L) in place

## 2021-06-27 NOTE — PROGRESS NOTES
Progress Notes by Samantha Herrera CNP at 5/14/2019 10:30 AM     Author: Samantha Herrera CNP Service: -- Author Type: Nurse Practitioner    Filed: 5/14/2019 10:58 AM Encounter Date: 5/14/2019 Status: Signed    : Samantha Herrera CNP (Nurse Practitioner)           Click to link to [facility] HEART CARE NOTE      Assessment/Recommendations   Assessment:    1. Nonischemic cardiomyopathy with systolic dysfunction, NYHA class II: Compensated.  She states she continues to have fatigue, mild dyspnea on exertion, and trace lower extremity edema.  Her weights have remained stable.  She tries to follow a low-sodium diet.    2.  Hypertension: Slightly elevated.  Blood pressure 142/86.  She states she ran out of her metoprolol and needs a refill.    Plan:  1.  Refilled metoprolol  2.  Continue current medications  3.  Continue daily weights and low-sodium diet  4.  Encourage regular exercise    Irene León will follow up with Dr. Chilel in October and in the heart failure clinic as needed.     History of Present Illness    Ms. Irene León is a 74 y.o. female seen at [facility] heart failure clinic today for continued follow-up.  She follows up for nonischemic cardiomyopathy with systolic dysfunction.  She had an echocardiogram October 2018 which showed an ejection fraction of 40%.  She has a past medical history significant for hypertension, hyperlipidemia, COPD, chronic kidney disease stage III.    Today, she comes in with continued fatigue and mild dyspnea on exertion.  She has trace lower extremity edema.  She denies orthopnea or PND.  She denies any chest pain.  She denies lightheadedness or dizziness.  She denies lightheadedness, shortness of breath, orthopnea, PND, palpitations, chest pain and abdominal fullness/bloating.      She is monitoring home weights which are stable between 149-153 pounds.  She is following a low sodium diet.       Physical  Examination Review of Systems   Vitals:    05/14/19 1036   BP: 142/86   Pulse: 60   Resp: 16     Body mass index is 28.21 kg/m .  Wt Readings from Last 3 Encounters:   05/14/19 153 lb (69.4 kg)   04/17/19 152 lb (68.9 kg)   03/08/19 152 lb 12.8 oz (69.3 kg)       General Appearance:     Alert, cooperative and in no acute distress.   ENT/Mouth: membranes moist, no oral lesions or bleeding gums.      EYES:  no scleral icterus, normal conjunctivae   Neck: no carotid bruits or thyromegaly   Chest/Lungs:   lungs are clear to auscultation, no rales or wheezing, respirations unlabored   Cardiovascular:   Regular. Normal first and second heart sounds with no murmurs, rubs, or gallops; the carotid, radial and posterior tibial pulses are intact, Jugular venous pressure normal, trace edema bilateral lower extremities    Abdomen:  Soft, nontender, nondistended, bowel sounds present   Extremities: no cyanosis or clubbing   Skin: warm, dry.    Neurologic: mood and affect are appropriate, alert and oriented x3      General: WNL  Eyes: WNL  Ears/Nose/Throat: WNL  Lungs: Cough  Heart: Shortness of Breath with activity  Stomach: WNL  Bladder: WNL  Muscle/Joints: WNL  Skin: WNL  Nervous System: WNL  Mental Health: WNL     Blood: WNL     Medical History  Surgical History Family History Social History   Past Medical History:   Diagnosis Date   ? Arrhythmia    ? Breast cancer (H) 2017   ? CHF (congestive heart failure) (H)    ? COPD (chronic obstructive pulmonary disease) (H)    ? Coronary artery disease    ? GERD (gastroesophageal reflux disease)    ? Hyperlipidemia    ? Hypertension    ? Idiopathic cardiomyopathy (H)     Past Surgical History:   Procedure Laterality Date   ? BREAST BIOPSY Right 2017   ? BREAST LUMPECTOMY Left 06/2017    x2   ? CARDIAC CATHETERIZATION     ? CATARACT EXTRACTION Left 07/18/2017   ? FL OPEN TX FEMORAL FRACTURE DISTAL MED/LAT CONDYLE Left 10/28/2015    Procedure: OPEN REDUCTION INTERNAL FIXATION LEFT   PROXIMAL FEMUR PERIPROSTHETIC FRACTURE;  Surgeon: Yovanny Albarran MD;  Location: [facility] Main OR;  Service: Orthopedics   ? REVISION TOTAL HIP ARTHROPLASTY Left     Family History   Problem Relation Age of Onset   ? Osteoporosis Other    ? Prostate cancer Brother    ? Breast cancer Maternal Aunt    ? Prostate cancer Maternal Uncle     Social History     Socioeconomic History   ? Marital status:      Spouse name: Not on file   ? Number of children: Not on file   ? Years of education: Not on file   ? Highest education level: Not on file   Occupational History   ? Not on file   Social Needs   ? Financial resource strain: Not on file   ? Food insecurity:     Worry: Not on file     Inability: Not on file   ? Transportation needs:     Medical: Not on file     Non-medical: Not on file   Tobacco Use   ? Smoking status: Former Smoker     Last attempt to quit: 10/28/2007     Years since quittin.5   ? Smokeless tobacco: Former User     Quit date: 2004   Substance and Sexual Activity   ? Alcohol use: No   ? Drug use: No   ? Sexual activity: Not on file   Lifestyle   ? Physical activity:     Days per week: Not on file     Minutes per session: Not on file   ? Stress: Not on file   Relationships   ? Social connections:     Talks on phone: Not on file     Gets together: Not on file     Attends [employer] service: Not on file     Active member of club or organization: Not on file     Attends meetings of clubs or organizations: Not on file     Relationship status: Not on file   ? Intimate partner violence:     Fear of current or ex partner: Not on file     Emotionally abused: Not on file     Physically abused: Not on file     Forced sexual activity: Not on file   Other Topics Concern   ? Not on file   Social History Narrative     and lives in home with 3 flight of steps          Medications  Allergies   Current Outpatient Medications   Medication Sig Dispense Refill   ? acetaminophen (TYLENOL) 500 MG tablet  Take 1-2 tablets (500-1,000 mg total) by mouth every 6 (six) hours as needed.  0   ? ascorbic acid, vitamin C, (VITAMIN C) 1000 MG tablet Take 1,000 mg by mouth daily.     ? aspirin 81 MG EC tablet Take 81 mg by mouth daily.     ? azelastine (ASTELIN) 137 mcg (0.1 %) nasal spray 2 sprays into each nostril daily.     ? cetirizine (ZYRTEC) 10 MG tablet Take 10 mg by mouth daily.            ? chlorhexidine (PERIDEX) 0.12 % solution Apply 15 mL to the mouth or throat at bedtime.            ? cholecalciferol, vitamin D3, 1,000 unit tablet Take 1,000 Units by mouth daily.     ? cyanocobalamin, vitamin B-12, (VITAMIN B-12 ORAL) Take 1,000 mcg by mouth daily.            ? fluticasone (FLONASE) 50 mcg/actuation nasal spray Apply 1 spray into each nostril at bedtime.     ? fluticasone-salmeterol (ADVAIR) 500-50 mcg/dose DISKUS Inhale 1 puff 2 (two) times a day.     ? furosemide (LASIX) 20 MG tablet Take 10 mg by mouth daily.            ? gabapentin (NEURONTIN) 300 MG capsule Take 1 capsule (300 mg total) by mouth 2 (two) times a day. 180 capsule 1   ? ipratropium-albuterol (DUO-NEB) 0.5-2.5 mg/3 mL nebulizer 1 neb 4 times a day until seen by primary and then as needed or as directed 60 vial 1   ? lidocaine (LIDODERM) 5 % Place 1 patch on the skin daily. To left hip 30 patch 1   ? losartan (COZAAR) 100 MG tablet Take 1 tablet (100 mg total) by mouth daily. 90 tablet 3   ? metoprolol succinate (TOPROL-XL) 100 MG 24 hr tablet Take 1 tablet (100 mg total) by mouth at bedtime. 90 tablet 3   ? MYRBETRIQ 25 mg Tb24 ER tablet Take 25 mg by mouth daily.            ? pantoprazole (PROTONIX) 40 MG tablet Take 40 mg by mouth daily as needed.            ? PROVENTIL HFA 90 mcg/actuation inhaler Inhale 1 puff every 6 (six) hours as needed.      ? ranitidine (ZANTAC) 150 MG tablet Take 150 mg by mouth daily.     ? simvastatin (ZOCOR) 40 MG tablet Take 40 mg by mouth bedtime.     ? solifenacin (VESICARE) 10 MG tablet Take 5 mg by mouth at  bedtime.            ? tamoxifen (NOLVADEX) 20 MG tablet Take 20 mg by mouth at bedtime.            ? traZODone (DESYREL) 50 MG tablet Take 1 tablet (50 mg total) by mouth at bedtime as needed. 30 tablet 0   ? venlafaxine (EFFEXOR XR) 150 MG 24 hr capsule Take 1 capsule (150 mg total) by mouth daily. 90 capsule 1   ? meclizine (ANTIVERT) 25 mg tablet Take 1 tablet (25 mg total) by mouth 3 (three) times a day as needed for dizziness. 30 tablet 0   ? predniSONE (DELTASONE) 10 mg tablet 4 tablets daily for 2 days, then 2 tablets daily for 3 days, then 1 tablet daily for 3 days, then DC. 17 tablet 0     No current facility-administered medications for this visit.       Allergies   Allergen Reactions   ? Sulfa (Sulfonamide Antibiotics) Rash     Headaches and dizziness.   ? Homeopathic Drugs Unknown and Other (See Comments)     Comment: runny nose, Comment: runny nose   ? Mold Extracts    ? Morphine (Pf)      hallucinate   ? Lisinopril Cough, Unknown and Itching     Comment: cough, Comment: cough   ? Sulfacetamide Sodium Rash         Lab Results    Chemistry CBC BNP   Lab Results   Component Value Date    CREATININE 1.17 (H) 04/17/2019    BUN 15 04/17/2019     04/17/2019    K 4.2 04/17/2019     (H) 04/17/2019    CO2 25 04/17/2019     Creatinine (mg/dL)   Date Value   04/17/2019 1.17 (H)   03/08/2019 1.13 (H)   03/03/2019 1.38 (H)   03/02/2019 1.24 (H)    Lab Results   Component Value Date    WBC 12.8 (H) 03/02/2019    HGB 12.3 03/02/2019    HCT 38.1 03/02/2019    MCV 90 03/02/2019     03/02/2019    Lab Results   Component Value Date     (H) 04/17/2019     BNP (pg/mL)   Date Value   04/17/2019 644 (H)   02/25/2019 805 (H)   02/24/2019 809 (H)          20 minutes were spent with the patient with greater than 50% spent on education and counseling.      Samantha Herrera, [facility]   Heart Failure Clinic

## 2021-06-27 NOTE — PROGRESS NOTES
Progress Notes by Gaby Chilel MD at 4/17/2019  8:50 AM     Author: Gaby Chilel MD Service: -- Author Type: Physician    Filed: 4/17/2019  9:56 AM Encounter Date: 4/17/2019 Status: Signed    : Gaby Chilel MD (Physician)           Click to link to [facility] Heart Care     [facility] HEART CARE NOTE    Thank you, Dr. Torres, for asking the [facility] Heart Care team to see Ms. Irene León to follow-up on nonischemic cardiomyopathy and chronic systolic heart failure.    Assessment/Recommendations   Assessment:    1.  Nonischemic cardiomyopathy, EF 40%.  This was documented again on recent echocardiogram performed during hospitalization for influenza and COPD exacerbation.  At the time of her hospitalization, they reported that she was on losartan 50 mg daily; however, she reports being on 100 mg tablets daily.  I do note that the time I last saw her nearly 2 years ago, I recommended an increase to 100 mg daily due to hypertension and her LV dysfunction.  Unfortunately, I cannot determine when her dose was subsequently decreased.  At this point, again, she claims she is taking 100 mg of losartan daily.  I did recommend a basic metabolic profile along with BNP as her dose was lowered during her recent hospitalization.  2.  Chronic systolic heart failure, borderline compensated.  She does have evidence of lower extremity edema and reports a 4 pound weight gain.  She will take extra Lasix when she goes home today.  Again we will check a BNP along with a basic metabolic profile.  3.  Essential hypertension, borderline controlled.  No change in medication at this point.  May be related to excess volume.  4.  Centrilobular emphysema, severe    Plan:  1.  Continue current medications for now.  Take extra dose of Lasix when you return home today.  2.  Check basic metabolic profile and BNP with further recommendations to follow       History of Present Illness    Ms. Irene León is a 74 y.o. female with  history of nonischemic cardiomyopathy, EF 40%, stage III chronic kidney disease, severe centrilobular emphysema and breast carcinoma who presents today for follow-up cardiac visit.  I last saw her in June 2017 and at that visit recommended increasing her dose of losartan from 50 mg to 100 mg daily due to elevated blood pressure and her LV dysfunction.  It appears she was on that dose through March 2018 although subsequent medication doses list her at being on 50 mg daily.  She was recently hospitalized for influenza with COPD exacerbation.  Her BNP was markedly elevated although her hypoxia did not appear to improve with extra diuretic therapy.  Echocardiogram showed moderate left ventricular dysfunction with an ejection fraction of 40% which was unchanged.    Since her discharge from the hospital, she reports chronic loose cough which is minimally productive.  No orthopnea or PND; however, she has noted increased lower extremity edema and reports a 4 pound weight gain recently.  She plans to go home and take an extra dose of Lasix.    ECG (personally reviewed): No ECG today    Cardiac Imaging Studies (personally reviewed): Recent echocardiogram as reported above     Physical Examination Review of Systems   Vitals:    04/17/19 0902   BP: 140/82   Pulse: 64   Resp: 16   SpO2: (!) 88%     Body mass index is 28.03 kg/m .  Wt Readings from Last 3 Encounters:   04/17/19 152 lb (68.9 kg)   03/08/19 152 lb 12.8 oz (69.3 kg)   03/03/19 154 lb 9.6 oz (70.1 kg)     General Appearance:   Awake, Alert, No acute distress.   HEENT:  No scleral icterus; the mucous membranes were pink and moist.   Neck: No cervical bruits or jugular venous distention    Chest: The spine was straight. The chest was symmetric.   Lungs:   Respirations unlabored; the lungs are clear to auscultation. No wheezing   Cardiovascular:    Regular rate and rhythm.  S1, S2 normal.  No murmur or gallop   Abdomen:  No organomegaly, masses, bruits, or tenderness.  Bowels sounds are present   Extremities:  1+ pitting edema both lower extremities from mid calf to feet   Skin: No xanthelasma. Warm, Dry.   Musculoskeletal: No tenderness.   Neurologic: Mood and affect are appropriate.    General: WNL  Eyes: WNL  Ears/Nose/Throat: WNL  Lungs: WNL  Heart: WNL  Stomach: WNL  Bladder: WNL  Muscle/Joints: WNL  Skin: WNL  Nervous System: WNL  Mental Health: WNL     Blood: WNL     Medical History  Surgical History Family History Social History   Past Medical History:   Diagnosis Date   ? Arrhythmia    ? Breast cancer (H)    ? CHF (congestive heart failure) (H)    ? COPD (chronic obstructive pulmonary disease) (H)    ? Coronary artery disease    ? GERD (gastroesophageal reflux disease)    ? Hyperlipidemia    ? Hypertension    ? Idiopathic cardiomyopathy (H)     Past Surgical History:   Procedure Laterality Date   ? BREAST LUMPECTOMY Left 06/2017    x2   ? CARDIAC CATHETERIZATION     ? CATARACT EXTRACTION Left 07/18/2017   ? [place] OPEN TX FEMORAL FRACTURE DISTAL MED/LAT CONDYLE Left 10/28/2015    Procedure: OPEN REDUCTION INTERNAL FIXATION LEFT  PROXIMAL FEMUR PERIPROSTHETIC FRACTURE;  Surgeon: Yovanny Albarran MD;  Location: [facility] OR;  Service: Orthopedics   ? REVISION TOTAL HIP ARTHROPLASTY Left     Family History   Problem Relation Age of Onset   ? Osteoporosis Other    ? Prostate cancer Brother    ? Breast cancer Maternal Aunt    ? Prostate cancer Maternal Uncle     Social History     Socioeconomic History   ? Marital status:      Spouse name: Not on file   ? Number of children: Not on file   ? Years of education: Not on file   ? Highest education level: Not on file   Occupational History   ? Not on file   Social Needs   ? Financial resource strain: Not on file   ? Food insecurity:     Worry: Not on file     Inability: Not on file   ? Transportation needs:     Medical: Not on file     Non-medical: Not on file   Tobacco Use   ? Smoking status: Former Smoker     Last  attempt to quit: 10/28/2007     Years since quittin.4   ? Smokeless tobacco: Former User     Quit date: 2004   Substance and Sexual Activity   ? Alcohol use: No   ? Drug use: No   ? Sexual activity: Not on file   Lifestyle   ? Physical activity:     Days per week: Not on file     Minutes per session: Not on file   ? Stress: Not on file   Relationships   ? Social connections:     Talks on phone: Not on file     Gets together: Not on file     Attends [employer] service: Not on file     Active member of club or organization: Not on file     Attends meetings of clubs or organizations: Not on file     Relationship status: Not on file   ? Intimate partner violence:     Fear of current or ex partner: Not on file     Emotionally abused: Not on file     Physically abused: Not on file     Forced sexual activity: Not on file   Other Topics Concern   ? Not on file   Social History Narrative     and lives in home with 3 flight of steps          Medications  Allergies   Current Outpatient Medications   Medication Sig Dispense Refill   ? acetaminophen (TYLENOL) 500 MG tablet Take 1-2 tablets (500-1,000 mg total) by mouth every 6 (six) hours as needed.  0   ? ascorbic acid, vitamin C, (VITAMIN C) 1000 MG tablet Take 1,000 mg by mouth daily.     ? aspirin 81 MG EC tablet Take 81 mg by mouth daily.     ? azelastine (ASTELIN) 137 mcg (0.1 %) nasal spray 2 sprays into each nostril daily.     ? cetirizine (ZYRTEC) 10 MG tablet Take 10 mg by mouth daily.            ? chlorhexidine (PERIDEX) 0.12 % solution Apply 15 mL to the mouth or throat at bedtime.            ? cholecalciferol, vitamin D3, 1,000 unit tablet Take 1,000 Units by mouth daily.     ? cyanocobalamin, vitamin B-12, (VITAMIN B-12 ORAL) Take 1,000 mcg by mouth daily.            ? fluticasone (FLONASE) 50 mcg/actuation nasal spray Apply 1 spray into each nostril at bedtime.     ? fluticasone-salmeterol (ADVAIR) 500-50 mcg/dose DISKUS Inhale 1 puff 2 (two) times  a day.     ? furosemide (LASIX) 20 MG tablet Take 10 mg by mouth daily.            ? gabapentin (NEURONTIN) 300 MG capsule Take 1 capsule (300 mg total) by mouth 2 (two) times a day. 180 capsule 1   ? ipratropium-albuterol (DUO-NEB) 0.5-2.5 mg/3 mL nebulizer 1 neb 4 times a day until seen by primary and then as needed or as directed 60 vial 1   ? lidocaine (LIDODERM) 5 % Place 1 patch on the skin daily. To left hip 30 patch 1   ? losartan (COZAAR) 100 MG tablet Take 1 tablet (100 mg total) by mouth daily. 90 tablet 3   ? metoprolol succinate (TOPROL-XL) 100 MG 24 hr tablet Take 100 mg by mouth at bedtime.            ? MYRBETRIQ 25 mg Tb24 ER tablet Take 25 mg by mouth daily.            ? pantoprazole (PROTONIX) 40 MG tablet Take 40 mg by mouth daily as needed.            ? PROVENTIL HFA 90 mcg/actuation inhaler Inhale 1 puff every 6 (six) hours as needed.      ? ranitidine (ZANTAC) 150 MG tablet Take 150 mg by mouth daily.     ? simvastatin (ZOCOR) 40 MG tablet Take 40 mg by mouth bedtime.     ? solifenacin (VESICARE) 10 MG tablet Take 5 mg by mouth at bedtime.            ? tamoxifen (NOLVADEX) 20 MG tablet Take 20 mg by mouth at bedtime.            ? traZODone (DESYREL) 50 MG tablet Take 1 tablet (50 mg total) by mouth at bedtime as needed. 30 tablet 0   ? venlafaxine (EFFEXOR XR) 150 MG 24 hr capsule Take 1 capsule (150 mg total) by mouth daily. 90 capsule 1   ? meclizine (ANTIVERT) 25 mg tablet Take 1 tablet (25 mg total) by mouth 3 (three) times a day as needed for dizziness. 30 tablet 0   ? predniSONE (DELTASONE) 10 mg tablet 4 tablets daily for 2 days, then 2 tablets daily for 3 days, then 1 tablet daily for 3 days, then DC. 17 tablet 0     No current facility-administered medications for this visit.       Allergies   Allergen Reactions   ? Sulfa (Sulfonamide Antibiotics) Rash     Headaches and dizziness.   ? Homeopathic Drugs Unknown and Other (See Comments)     Comment: runny nose, Comment: runny nose   ?  Mold Extracts    ? Morphine (Pf)      hallucinate   ? Lisinopril Cough, Unknown and Itching     Comment: cough, Comment: cough   ? Sulfacetamide Sodium Rash         Lab Results    Chemistry/lipid CBC Cardiac Enzymes/BNP/TSH/INR   Lab Results   Component Value Date    CREATININE 1.13 (H) 03/08/2019    BUN 20 03/08/2019    K 3.4 (L) 03/08/2019     03/08/2019     03/08/2019    CO2 25 03/08/2019    Lab Results   Component Value Date    WBC 12.8 (H) 03/02/2019    HGB 12.3 03/02/2019    HCT 38.1 03/02/2019    MCV 90 03/02/2019     03/02/2019    Lab Results   Component Value Date    CKMB <1 08/05/2011    TROPONINI 0.06 02/25/2019     (H) 02/25/2019    TSH 0.57 11/01/2015    INR 3.33 (H) 11/13/2017

## 2021-06-28 NOTE — PROGRESS NOTES
Progress Notes by David Fortune, PT Student at 1/31/2020  2:00 PM     Author: David Fortune, PT Student Service: -- Author Type: PT Student    Filed: 1/31/2020  3:06 PM Encounter Date: 1/31/2020 Status: Attested    : David Fortune, PT Student (PT Student) Cosigner: Emir Hendrickson, PT at 1/31/2020  3:30 PM    Attestation signed by Emir Hendrickson, PT at 1/31/2020  3:30 PM    I attest that I was present in the room, making skilled assessments and directing the service provided today.  I was responsible for the assessment and treatment of the patient.  During this time, I was not engaged in treating another patient or doing other tasks.  Emir Hendrickson, JOSE MANUEL                 [facility] Daily Progress Note    Patient Name: Irene León  Date: 1/31/2020  Visit #: 3/12  Referring provider: Apolinar Mayo*  Visit Diagnosis:     ICD-10-CM    1. Chronic left hip pain M25.552     G89.29    2. Abnormality of gait R26.9    3. Chronic bilateral low back pain without sciatica M54.5     G89.29    4. Weakness of left hip R29.898    5. Muscle weakness (generalized) M62.81          Assessment:     Patient stated that the her back feels much looser following therapy today and her pain is reduced. Patient states that the LTR is helping her a lot and the soft tissue massage on her low back makes her feel better as well. PT also discussed progressing gait training so that the patient can weight bear more on her affected, left leg and improve her gait pattern with therapy.     HEP/POC compliance is  good .  Patient demonstrates understanding/independence with home program.  Patient is benefitting from skilled physical therapy and is making steady progress toward functional goals.    Goal Status:  Pt. will demonstrate/verbalize independence in self-management of condition in : 6 weeks  Pt. will be independent with home exercise program in : 6 weeks  Pt. will be able to walk : 20  minutes;around the house;with less pain;for household mobility;in 6 weeks    Pt will: demonstrate an ability to perform a sit to stand from a chair with left hip pain less than 3/10 within 6 weeks so that she can tolerate getting up and down from a chair at home   Pt will: Patient will improve her sit to stand score to >=10 reps on the 30 second sit to  order to reduce her fall risk within 6 weeks.       Plan / Patient Education:     Progress with home program as tolerated.   Plan for next session: Progress with soft tissue massage and progress exercises for core and leg strengthening.     Subjective:   Patient states that she is very sore. Her quad and lateral knee are in pain and her low back hurts. Patient stated she felt better after the last treatment session, but then her pain came back.     Patient is completing the exercises 2x per day.   Pain Ratin/10  6/10 in the right glut and quad regions    Objective:     Patient had mildly decreased pain today and tolerated the exercises with less discomfort and pain today.     Treatment Today     Exercises: Exercises: (see flowsheet for updated exercises)  Exercise #1: Supine LTR bilaterally 1x5  Comment #1:  Supine hip bridges 1x10  Exercise #2: Seated hip marches 1x10  Comment #2: Seated hip abduction with yellow band 1x 10  Exercise #3: Long arc quad bilaterally 1x10    Manual therapy:  Soft tissue massage of low back paraspinals.      TREATMENT MINUTES COMMENTS   Evaluation     Self-care/ Home management     Manual therapy 5 NC STM to low back paraspinals   Neuromuscular Re-education     Therapeutic Activity     Therapeutic Exercises 25 See flowsheet   Gait training     Modality__________________                Total 30    Blank areas are intentional and mean the treatment did not include these items.       David Fortune, PT Student   2020

## 2021-06-28 NOTE — PROGRESS NOTES
Progress Notes by Samantha Herrera CNP at 1/28/2020 10:30 AM     Author: Samantha Herrera CNP Service: -- Author Type: Nurse Practitioner    Filed: 1/28/2020 10:56 AM Encounter Date: 1/28/2020 Status: Signed    : Samantha Herrera CNP (Nurse Practitioner)             Assessment/Recommendations   Assessment:    1. Nonischemic cardiomyopathy with systolic dysfunction, NYHA class II: Compensated.  She continues to have fatigue and mild dyspnea on exertion.  Her weights have remained stable.  She continues to try to follow a low-sodium diet.  2.  Hypertension: Elevated today in the clinic.  Blood pressure 156/90.  Her PMD recently increased her losartan to 50 mg daily.  She states she monitors her blood pressure at home and is 120/70    Plan:  1.  Continue current medications  2.  Continue daily weights and low-sodium diet  3.  Continue to monitor blood pressure with a goal of less than 130/80.  Contact clinic or PMD if blood pressure is elevated above goal    Irene León will follow up with Dr. Chilel in May and in the heart failure clinic in 6 months or sooner if needed.     History of Present Illness/Subjective    Ms. Irene León is a 74 y.o. female seen at [facility] heart failure clinic today for continued follow-up.  She follows up for nonischemic cardiomyopathy with systolic dysfunction.  She had an echocardiogram November 18, 2019 which showed an ejection fraction of 39%.  She has a past medical history significant for hypertension, dyslipidemia, COPD, and chronic kidney disease stage III.    Today, she comes in with continued fatigue and dyspnea on exertion.  She states her symptoms have been stable.  She denies orthopnea, PND or chest pain.  She denies lightheadedness, shortness of breath, orthopnea, PND, palpitations, chest pain, abdominal fullness/bloating and lower extremity edema.      She is monitoring home weights which are stable.  She is following a low sodium diet.          Physical Examination Review of Systems   Vitals:    01/28/20 1032   BP: 156/90   Pulse: 68   Resp: 16     Body mass index is 26.52 kg/m .  Wt Readings from Last 3 Encounters:   01/28/20 145 lb (65.8 kg)   01/21/20 144 lb 12.8 oz (65.7 kg)   01/07/20 153 lb 3.2 oz (69.5 kg)       General Appearance:   no distress, normal body habitus   ENT/Mouth: membranes moist, no oral lesions or bleeding gums.      EYES:  no scleral icterus, normal conjunctivae   Neck: no carotid bruits or thyromegaly   Chest/Lungs:   lungs are clear to auscultation, no rales or wheezing, equal chest wall expansion    Cardiovascular:   Regular. Normal first and second heart sounds with no murmurs, rubs, or gallops; Jugular venous pressure normal, no edema    Abdomen:  no organomegaly, masses, bruits, or tenderness; bowel sounds are present   Extremities: no cyanosis or clubbing   Skin: no xanthelasma, warm.    Neurologic: normal  bilateral, no tremors     Psychiatric: alert and oriented x3    General: WNL  Eyes: WNL  Ears/Nose/Throat: WNL  Lungs: WNL  Heart: WNL  Stomach: WNL  Bladder: WNL  Muscle/Joints: WNL  Skin: WNL  Nervous System: WNL  Mental Health: WNL     Blood: WNL     Medical History  Surgical History Family History Social History   Past Medical History:   Diagnosis Date   ? Arrhythmia    ? Breast cancer (H) 2017   ? CHF (congestive heart failure) (H)    ? COPD (chronic obstructive pulmonary disease) (H)    ? Coronary artery disease    ? GERD (gastroesophageal reflux disease)    ? Hyperlipidemia    ? Hypertension    ? Idiopathic cardiomyopathy (H)     Past Surgical History:   Procedure Laterality Date   ? BREAST BIOPSY Right 2017   ? BREAST LUMPECTOMY Left 06/2017    x2   ? CARDIAC CATHETERIZATION     ? CATARACT EXTRACTION Left 07/18/2017   ? AL OPEN TX FEMORAL FRACTURE DISTAL MED/LAT CONDYLE Left 10/28/2015    Procedure: OPEN REDUCTION INTERNAL FIXATION LEFT  PROXIMAL FEMUR PERIPROSTHETIC FRACTURE;  Surgeon: Yovanny CHARLES  MD [place];  Location: [facility] Main OR;  Service: Orthopedics   ? REVISION TOTAL HIP ARTHROPLASTY Left     Family History   Problem Relation Age of Onset   ? Osteoporosis Other    ? Prostate cancer Brother    ? Breast cancer Maternal Aunt    ? Prostate cancer Maternal Uncle     Social History     Socioeconomic History   ? Marital status:      Spouse name: Not on file   ? Number of children: Not on file   ? Years of education: Not on file   ? Highest education level: Not on file   Occupational History   ? Not on file   Social Needs   ? Financial resource strain: Not on file   ? Food insecurity:     Worry: Not on file     Inability: Not on file   ? Transportation needs:     Medical: Not on file     Non-medical: Not on file   Tobacco Use   ? Smoking status: Former Smoker     Last attempt to quit: 10/28/2007     Years since quittin.2   ? Smokeless tobacco: Former User     Quit date: 2004   Substance and Sexual Activity   ? Alcohol use: No   ? Drug use: No   ? Sexual activity: Not on file   Lifestyle   ? Physical activity:     Days per week: Not on file     Minutes per session: Not on file   ? Stress: Not on file   Relationships   ? Social connections:     Talks on phone: Not on file     Gets together: Not on file     Attends Mandaeism service: Not on file     Active member of club or organization: Not on file     Attends meetings of clubs or organizations: Not on file     Relationship status: Not on file   ? Intimate partner violence:     Fear of current or ex partner: Not on file     Emotionally abused: Not on file     Physically abused: Not on file     Forced sexual activity: Not on file   Other Topics Concern   ? Not on file   Social History Narrative     and lives in home with 3 flight of steps          Medications  Allergies   Current Outpatient Medications   Medication Sig Dispense Refill   ? acetaminophen (TYLENOL) 500 MG tablet Take 1-2 tablets (500-1,000 mg total) by mouth every 6  (six) hours as needed.  0   ? ascorbic acid, vitamin C, (VITAMIN C) 1000 MG tablet Take 1,000 mg by mouth daily.     ? aspirin 81 MG EC tablet Take 81 mg by mouth daily.     ? azelastine (ASTELIN) 137 mcg (0.1 %) nasal spray 2 sprays into each nostril daily.     ? azithromycin (ZITHROMAX) 250 MG tablet Take 250 mg by mouth daily.     ? benzonatate (TESSALON) 200 MG capsule Take 1 capsule (200 mg total) by mouth 3 (three) times a day. 60 capsule 2   ? chlorhexidine (PERIDEX) 0.12 % solution Apply 15 mL to the mouth or throat at bedtime.            ? cholecalciferol, vitamin D3, 1,000 unit tablet Take 1,000 Units by mouth daily.     ? cyanocobalamin, vitamin B-12, (VITAMIN B-12 ORAL) Take 1,000 mcg by mouth daily.            ? fluticasone (FLONASE) 50 mcg/actuation nasal spray Apply 1 spray into each nostril at bedtime.     ? fluticasone-salmeterol (ADVAIR) 500-50 mcg/dose DISKUS Inhale 1 puff 2 (two) times a day.     ? furosemide (LASIX) 20 MG tablet Take 10 mg by mouth daily.            ? gabapentin (NEURONTIN) 300 MG capsule Take 1 capsule (300 mg total) by mouth 2 (two) times a day. (Patient taking differently: Take 600 mg by mouth 2 (two) times a day. ) 180 capsule 1   ? ipratropium (ATROVENT HFA) 17 mcg/actuation inhaler Inhale 2 puffs every 6 (six) hours. 3 Inhaler 3   ? ipratropium-albuterol (DUO-NEB) 0.5-2.5 mg/3 mL nebulizer 1 neb 4 times a day until seen by primary and then as needed or as directed 60 vial 1   ? lidocaine (LIDODERM) 5 % Place 1 patch on the skin daily. To left hip 30 patch 3   ? losartan (COZAAR) 50 MG tablet Take 1 tablet (50 mg total) by mouth daily. 90 tablet 0   ? metoprolol succinate (TOPROL-XL) 50 MG 24 hr tablet Take 1.5 tablets (75 mg total) by mouth daily. 135 tablet 3   ? mirabegron (MYRBETRIQ) 50 mg Tb24 ER tablet Take 1 tablet (50 mg total) by mouth daily. 90 tablet 3   ? oxyCODONE (ROXICODONE) 5 MG immediate release tablet Take 1 tablet (5 mg total) by mouth 2 (two) times a  day. 56 tablet 0   ? pantoprazole (PROTONIX) 40 MG tablet Take 1 tablet (40 mg total) by mouth daily. 90 tablet 2   ? PROVENTIL HFA 90 mcg/actuation inhaler Inhale 1 puff every 6 (six) hours as needed.      ? simvastatin (ZOCOR) 40 MG tablet Take 40 mg by mouth bedtime.     ? sodium chloride (OCEAN) 0.65 % nasal spray 2 sprays into each nostril as needed.     ? solifenacin (VESICARE) 5 MG tablet Take 1 tablet (5 mg total) by mouth at bedtime. 90 tablet 3   ? tamoxifen (NOLVADEX) 20 MG tablet TAKE 1 TABLET DAILY 90 tablet 4   ? traZODone (DESYREL) 50 MG tablet Take 1 tablet (50 mg total) by mouth at bedtime as needed. 90 tablet 1   ? triamcinolone (KENALOG) 0.1 % cream Apply to rash on arms twice daily for one week 30 g 0   ? venlafaxine (EFFEXOR-XR) 150 MG 24 hr capsule TAKE 1 CAPSULE DAILY 90 capsule 4   ? acetaminophen-codeine (TYLENOL #3) 300-30 mg per tablet TK 1 T PO Q 4 TO 6  H PRN P     ? famotidine (PEPCID) 20 MG tablet Take 1 tablet by mouth 2 (two) times a day.       Current Facility-Administered Medications   Medication Dose Route Frequency Provider Last Rate Last Dose   ? denosumab 60 mg (PROLIA 60 mg/ml)  60 mg Subcutaneous Q6 Months Suzanna Shane MD        Allergies   Allergen Reactions   ? Sulfa (Sulfonamide Antibiotics) Rash     Headaches and dizziness.   ? Homeopathic Drugs Unknown and Other (See Comments)     Comment: runny nose, Comment: runny nose   ? Mold Extracts    ? Morphine (Pf)      hallucinate   ? Lisinopril Cough, Unknown and Itching     Comment: cough, Comment: cough   ? Sulfacetamide Sodium Rash         Lab Results    Chemistry/lipid CBC Cardiac Enzymes/BNP/TSH/INR   Lab Results   Component Value Date    CREATININE 1.24 (H) 01/15/2020    BUN 13 01/15/2020    K 4.1 01/15/2020     01/15/2020     01/15/2020    CO2 24 01/15/2020    Lab Results   Component Value Date    WBC 9.9 06/11/2019    HGB 13.2 10/04/2019    HCT 42.9 06/11/2019    MCV 90 06/11/2019     06/11/2019     Lab Results   Component Value Date    CKMB <1 08/05/2011    TROPONINI 0.06 02/25/2019    BNP 1,019 (H) 11/01/2019    TSH 0.57 11/01/2015    INR 3.33 (H) 11/13/2017             This note has been dictated using voice recognition software. Any grammatical, typographical, or context distortions are unintentional and inherent to the software

## 2021-06-28 NOTE — PROGRESS NOTES
Progress Notes by Samantha Herrera CNP at 11/26/2019 12:50 PM     Author: Samantha Herrera CNP Service: -- Author Type: Nurse Practitioner    Filed: 11/26/2019  1:03 PM Encounter Date: 11/26/2019 Status: Signed    : Samantha Herrera CNP (Nurse Practitioner)             Assessment/Recommendations   Assessment:    1. Nonischemic cardiomyopathy with systolic dysfunction, NYHA class II: Compensated.  She continues to have fatigue, dyspnea on exertion, and trace lower extremity edema.  She states her weight decreased 3 to 4 pounds after taking Lasix 10 mg daily.  She tries to follow a low-sodium diet.    2.  Hypertension: Elevated.  Blood pressure 160/100 with a recheck of 160/100.  Her metoprolol was decreased by Dr. Chilel beginning of November due to bradycardia.  She states her PMD also decreased her losartan to 25 mg daily at the end of October due to hypotension.  She does have a blood pressure cuff at home which she monitors occasionally.  She sees her PMD the end of the week and can reassess her blood pressure    3.  COPD: She states this has progressed to stage IV.    Plan:  1.   Heart failure medications:  - Beta blocker therapy with metroprolol succinate increased to 75 mg daily  - ARB therapy with losartan 25 mg daily  - Diuretic therapy with furosemide 10 mg daily  2.  Continue daily weights and low-sodium diet  3.  Encouraged regular exercise  4.  Recommended monitoring blood pressure at home and calling the heart clinic in 1 week if elevated.  Blood pressure goal of less than 120/80    Irene León will follow up with Dr. Chilel in May and in the heart failure clinic in 1 month.     History of Present Illness/Subjective    Ms. Irene León is a 74 y.o. female seen at [facility] heart failure clinic today for continued follow-up.  She follows up for nonischemic cardiomyopathy with systolic dysfunction.  She had an echocardiogram November 18, 2019 which showed an ejection  fraction of 39%.  She has a past medical history significant for hypertension, dyslipidemia, COPD, and chronic kidney disease stage III.    During the last clinic visit, Dr. Chilel decreased her metoprolol to 50 mg daily due to bradycardia.  Today her heart rate is elevated at 100 bpm.  She states her lower extremity edema has improved with taking Lasix 10 mg daily.  She continues to have fatigue and dyspnea on exertion.  She denies orthopnea or PND.  She denies chest pain.  She denies lightheadedness, shortness of breath, orthopnea, PND, palpitations, chest pain and abdominal fullness/bloating.      She is monitoring home weights which are stable around 150 pounds.  She is trying to follow a low sodium diet.      ECHOCARDIOGRAM: 11/18/2019    The calculated left ventricular ejection fraction is 39%. This represents a moderately decreased ejection fraction.    Left ventricular diastolic function is abnnormal.    Normal right ventricular size and systolic function.    No hemodynamically significant valvular heart abnormalities.    When compared to the previous study dated 10/31/2018, no change is evident          Physical Examination Review of Systems   Vitals:    11/26/19 1251   BP: (!) 160/100   Pulse:    Resp:      Body mass index is 27.44 kg/m .  Wt Readings from Last 3 Encounters:   11/26/19 150 lb (68 kg)   11/18/19 153 lb (69.4 kg)   11/01/19 153 lb (69.4 kg)       General Appearance:   no distress, normal body habitus   ENT/Mouth: membranes moist, no oral lesions or bleeding gums.      EYES:  no scleral icterus, normal conjunctivae   Neck: no carotid bruits or thyromegaly   Chest/Lungs:   lungs are clear to auscultation, no rales or wheezing, equal chest wall expansion    Cardiovascular:   Regular. Normal first and second heart sounds with no murmurs, rubs, or gallops; Jugular venous pressure normal, trace edema bilaterally    Abdomen:  no organomegaly, masses, bruits, or tenderness; bowel sounds are present    Extremities: no cyanosis or clubbing   Skin: no xanthelasma, warm.    Neurologic: normal  bilateral, no tremors     Psychiatric: alert and oriented x3    General: WNL  Eyes: WNL  Ears/Nose/Throat: WNL  Lungs: WNL  Heart: WNL  Stomach: WNL  Bladder: WNL  Muscle/Joints: WNL  Skin: WNL  Nervous System: WNL  Mental Health: WNL     Blood: WNL     Medical History  Surgical History Family History Social History   Past Medical History:   Diagnosis Date   ? Arrhythmia    ? Breast cancer (H) 2017   ? CHF (congestive heart failure) (H)    ? COPD (chronic obstructive pulmonary disease) (H)    ? Coronary artery disease    ? GERD (gastroesophageal reflux disease)    ? Hyperlipidemia    ? Hypertension    ? Idiopathic cardiomyopathy (H)     Past Surgical History:   Procedure Laterality Date   ? BREAST BIOPSY Right 2017   ? BREAST LUMPECTOMY Left 06/2017    x2   ? CARDIAC CATHETERIZATION     ? CATARACT EXTRACTION Left 07/18/2017   ? MI OPEN TX FEMORAL FRACTURE DISTAL MED/LAT CONDYLE Left 10/28/2015    Procedure: OPEN REDUCTION INTERNAL FIXATION LEFT  PROXIMAL FEMUR PERIPROSTHETIC FRACTURE;  Surgeon: Yovanny Albarran MD;  Location: [facility] Main OR;  Service: Orthopedics   ? REVISION TOTAL HIP ARTHROPLASTY Left     Family History   Problem Relation Age of Onset   ? Osteoporosis Other    ? Prostate cancer Brother    ? Breast cancer Maternal Aunt    ? Prostate cancer Maternal Uncle     Social History     Socioeconomic History   ? Marital status:      Spouse name: Not on file   ? Number of children: Not on file   ? Years of education: Not on file   ? Highest education level: Not on file   Occupational History   ? Not on file   Social Needs   ? Financial resource strain: Not on file   ? Food insecurity:     Worry: Not on file     Inability: Not on file   ? Transportation needs:     Medical: Not on file     Non-medical: Not on file   Tobacco Use   ? Smoking status: Former Smoker     Last attempt to quit: 10/28/2007      Years since quittin.0   ? Smokeless tobacco: Former User     Quit date: 2004   Substance and Sexual Activity   ? Alcohol use: No   ? Drug use: No   ? Sexual activity: Not on file   Lifestyle   ? Physical activity:     Days per week: Not on file     Minutes per session: Not on file   ? Stress: Not on file   Relationships   ? Social connections:     Talks on phone: Not on file     Gets together: Not on file     Attends [employer] service: Not on file     Active member of club or organization: Not on file     Attends meetings of clubs or organizations: Not on file     Relationship status: Not on file   ? Intimate partner violence:     Fear of current or ex partner: Not on file     Emotionally abused: Not on file     Physically abused: Not on file     Forced sexual activity: Not on file   Other Topics Concern   ? Not on file   Social History Narrative     and lives in home with 3 flight of steps          Medications  Allergies   Current Outpatient Medications   Medication Sig Dispense Refill   ? acetaminophen (TYLENOL) 500 MG tablet Take 1-2 tablets (500-1,000 mg total) by mouth every 6 (six) hours as needed.  0   ? ascorbic acid, vitamin C, (VITAMIN C) 1000 MG tablet Take 1,000 mg by mouth daily.     ? aspirin 81 MG EC tablet Take 81 mg by mouth daily.     ? azelastine (ASTELIN) 137 mcg (0.1 %) nasal spray 2 sprays into each nostril daily.     ? azithromycin (ZITHROMAX) 250 MG tablet Take every other day as instructed by pulmonologist 30 tablet 0   ? benzonatate (TESSALON) 200 MG capsule Take 1 capsule (200 mg total) by mouth 3 (three) times a day. 60 capsule 2   ? chlorhexidine (PERIDEX) 0.12 % solution Apply 15 mL to the mouth or throat at bedtime.            ? cholecalciferol, vitamin D3, 1,000 unit tablet Take 1,000 Units by mouth daily.     ? cyanocobalamin, vitamin B-12, (VITAMIN B-12 ORAL) Take 1,000 mcg by mouth daily.            ? famotidine (PEPCID) 20 MG tablet Take 1 tablet by mouth 2 (two)  times a day.     ? fluticasone (FLONASE) 50 mcg/actuation nasal spray Apply 1 spray into each nostril at bedtime.     ? fluticasone-salmeterol (ADVAIR) 500-50 mcg/dose DISKUS Inhale 1 puff 2 (two) times a day.     ? furosemide (LASIX) 20 MG tablet Take 10 mg by mouth daily.            ? gabapentin (NEURONTIN) 300 MG capsule Take 1 capsule (300 mg total) by mouth 2 (two) times a day. 180 capsule 1   ? ipratropium (ATROVENT HFA) 17 mcg/actuation inhaler Inhale 2 puffs every 6 (six) hours. 3 Inhaler 3   ? ipratropium-albuterol (DUO-NEB) 0.5-2.5 mg/3 mL nebulizer 1 neb 4 times a day until seen by primary and then as needed or as directed 60 vial 1   ? losartan (COZAAR) 25 MG tablet Take 1 tablet (25 mg total) by mouth daily. 90 tablet 1   ? mirabegron (MYRBETRIQ) 50 mg Tb24 ER tablet Take 1 tablet (50 mg total) by mouth daily. 90 tablet 1   ? PROVENTIL HFA 90 mcg/actuation inhaler Inhale 1 puff every 6 (six) hours as needed.      ? simvastatin (ZOCOR) 40 MG tablet Take 40 mg by mouth bedtime.     ? tamoxifen (NOLVADEX) 20 MG tablet Take 20 mg by mouth at bedtime.            ? traZODone (DESYREL) 50 MG tablet Take 1 tablet (50 mg total) by mouth at bedtime as needed. 90 tablet 1   ? venlafaxine (EFFEXOR-XR) 150 MG 24 hr capsule TAKE 1 CAPSULE DAILY 90 capsule 4   ? cetirizine (ZYRTEC) 10 MG tablet Take 10 mg by mouth daily.            ? codeine-guaiFENesin (GUAIFENESIN AC)  mg/5 mL liquid Take 5 mL by mouth 3 (three) times a day as needed for cough. 236 mL 0   ? lidocaine (LIDODERM) 5 % Place 1 patch on the skin daily. To left hip 30 patch 1   ? meclizine (ANTIVERT) 25 mg tablet Take 1 tablet (25 mg total) by mouth 3 (three) times a day as needed for dizziness. 30 tablet 0   ? metoprolol succinate (TOPROL-XL) 50 MG 24 hr tablet Take 1.5 tablets (75 mg total) by mouth daily. 135 tablet 3   ? MYRBETRIQ 25 mg Tb24 ER tablet Take 25 mg by mouth daily.            ? pantoprazole (PROTONIX) 40 MG tablet Take 40 mg by  mouth daily as needed.            ? ranitidine (ZANTAC) 150 MG tablet Take 150 mg by mouth daily.     ? solifenacin (VESICARE) 5 MG tablet Take 1 tablet (5 mg total) by mouth at bedtime. 30 tablet 0     No current facility-administered medications for this visit.     Allergies   Allergen Reactions   ? Sulfa (Sulfonamide Antibiotics) Rash     Headaches and dizziness.   ? Homeopathic Drugs Unknown and Other (See Comments)     Comment: runny nose, Comment: runny nose   ? Mold Extracts    ? Morphine (Pf)      hallucinate   ? Lisinopril Cough, Unknown and Itching     Comment: cough, Comment: cough   ? Sulfacetamide Sodium Rash         Lab Results    Chemistry/lipid CBC Cardiac Enzymes/BNP/TSH/INR   Lab Results   Component Value Date    CREATININE 1.23 (H) 11/20/2019    BUN 13 11/20/2019    K 5.1 (H) 11/20/2019     11/20/2019     11/20/2019    CO2 22 11/20/2019    Lab Results   Component Value Date    WBC 9.9 06/11/2019    HGB 13.2 10/04/2019    HCT 42.9 06/11/2019    MCV 90 06/11/2019     06/11/2019    Lab Results   Component Value Date    CKMB <1 08/05/2011    TROPONINI 0.06 02/25/2019    BNP 1,019 (H) 11/01/2019    TSH 0.57 11/01/2015    INR 3.33 (H) 11/13/2017             This note has been dictated using voice recognition software. Any grammatical, typographical, or context distortions are unintentional and inherent to the software

## 2021-06-28 NOTE — PROGRESS NOTES
Progress Notes by Gaby Chilel MD at 11/1/2019  2:30 PM     Author: Gaby Chilel MD Service: -- Author Type: Physician    Filed: 11/1/2019  3:18 PM Encounter Date: 11/1/2019 Status: Signed    : Gaby Chilel MD (Physician)           Thank you, Dr. Montanez, for asking the [facility] team to see Ms. Irene León to follow-up on nonischemic cardiomyopathy, PVCs and essential hypertension.    Assessment/Recommendations   Assessment:    1.  Nonischemic cardiomyopathy, EF 40% by echocardiogram 1 year ago.  Patient denies any symptoms of orthopnea or PND.  She does have intermittent lower extremity edema which may be related to dietary sodium indiscretion which appears to improve with transient increases in her diuretic therapy.  I have recommended a follow-up echocardiogram.  2.  Dyspnea on exertion, likely multifactorial.  I suspect it is related to worsening of her COPD as well as recent development of bronchitis.  Does not appear to be aggravated by recent decrease in her losartan dose for mild hypotension.  We will check a BNP and basic metabolic profile to see whether there is evidence of worsening congestive failure.  Further recommendations based on those results.  3.  Irregular heart rhythm.  Likely related to PVCs.  Will verify with ECG today.  This will also help to discern whether she is bradycardic as suggested by her pulse rate.  4.  COPD with worsening based on pulmonary function studies done recently.    Plan:  1.  Check ECG today along with basic metabolic profile and BNP.  ECG demonstrates sinus bradycardia.  We will decrease metoprolol succinate to 50 mg daily.  2.  We will likely encourage that the patient go back up to 10 mg daily of furosemide as it appears she has cut it back to 5 mg daily on her own.  3.  Echocardiogram to reassess LV function.       History of Present Illness    Ms. Irene León is a 74 y.o. female with history of nonischemic cardiomyopathy who with  minimal coronary artery disease on angiography in 2003, essential hypertension, history of PVCs and severe emphysema who presents to the office today for a follow-up visit.  Since I saw her 6 months ago, it appears her dose of losartan had to be decreased due to hypotension.  She also tells me she takes furosemide 5 mg most days of the week with an occasional increase to 10 or 20 mg due to swelling.  According to my notes in the spring, she was on furosemide 10 mg daily at that time.  She reports worsening symptoms of exertional dyspnea over the last 3 to 4 months.  No orthopnea or PND.  She was in to see her pulmonologist recently who felt she had evidence of worsening lung disease but also raised a question of worsening heart failure as a contributing cause to her exertional dyspnea.  She also was just diagnosed with bronchitis and has begun a course of azithromycin.  She is quite frustrated over her shortness of breath and wheezing.    ECG (personally reviewed): ECG demonstrates sinus bradycardia rate 52, left atrial enlargement left bundle branch block pattern.  Rate has decreased since previous ECG done in February 2019.    Cardiac Imaging Studies (personally reviewed): No recent imaging studies     Physical Examination Review of Systems   Vitals:    11/01/19 1433   BP: 120/71   Pulse: (!) 54   Resp: 16     Body mass index is 27.98 kg/m .  Wt Readings from Last 3 Encounters:   11/01/19 153 lb (69.4 kg)   10/30/19 145 lb (65.8 kg)   08/19/19 150 lb 3 oz (68.1 kg)     General Appearance:   Awake, Alert, No acute distress.   HEENT:  No scleral icterus; the mucous membranes were pink and moist.   Neck: No cervical bruits or jugular venous distention    Chest: The spine was straight. The chest was symmetric.   Lungs:   Respirations unlabored; the lungs reveal rhonchi throughout all lung fields.   Cardiovascular:    Regular rhythm with occasional ectopic beat.  S1, S2 normal.  No audible murmur or gallop.   Abdomen:   No organomegaly, masses, bruits, or tenderness. Bowels sounds are present   Extremities:  Trace ankle edema bilaterally.   Skin: No xanthelasma. Warm, Dry.   Musculoskeletal: No tenderness.   Neurologic: Mood and affect are appropriate.    General: WNL  Eyes: WNL  Ears/Nose/Throat: WNL  Lungs: Shortness of Breath, Wheezing  Heart: WNL  Stomach: WNL  Bladder: Frequent Urination at Night  Muscle/Joints: Muscle Pain  Skin: WNL  Nervous System: WNL  Mental Health: WNL     Blood: Easy Bleeding, Easy Bruising     Medical History  Surgical History Family History Social History   Past Medical History:   Diagnosis Date   ? Arrhythmia    ? Breast cancer (H) 2017   ? CHF (congestive heart failure) (H)    ? COPD (chronic obstructive pulmonary disease) (H)    ? Coronary artery disease    ? GERD (gastroesophageal reflux disease)    ? Hyperlipidemia    ? Hypertension    ? Idiopathic cardiomyopathy (H)     Past Surgical History:   Procedure Laterality Date   ? BREAST BIOPSY Right 2017   ? BREAST LUMPECTOMY Left 06/2017    x2   ? CARDIAC CATHETERIZATION     ? CATARACT EXTRACTION Left 07/18/2017   ? MT OPEN TX FEMORAL FRACTURE DISTAL MED/LAT CONDYLE Left 10/28/2015    Procedure: OPEN REDUCTION INTERNAL FIXATION LEFT  PROXIMAL FEMUR PERIPROSTHETIC FRACTURE;  Surgeon: Yovanny Albarran MD;  Location: [facility] [place] OR;  Service: Orthopedics   ? REVISION TOTAL HIP ARTHROPLASTY Left     Family History   Problem Relation Age of Onset   ? Osteoporosis Other    ? Prostate cancer Brother    ? Breast cancer Maternal Aunt    ? Prostate cancer Maternal Uncle     Social History     Socioeconomic History   ? Marital status:      Spouse name: Not on file   ? Number of children: Not on file   ? Years of education: Not on file   ? Highest education level: Not on file   Occupational History   ? Not on file   Social Needs   ? Financial resource strain: Not on file   ? Food insecurity:     Worry: Not on file     Inability: Not on file   ?  Transportation needs:     Medical: Not on file     Non-medical: Not on file   Tobacco Use   ? Smoking status: Former Smoker     Last attempt to quit: 10/28/2007     Years since quittin.0   ? Smokeless tobacco: Former User     Quit date: 2004   Substance and Sexual Activity   ? Alcohol use: No   ? Drug use: No   ? Sexual activity: Not on file   Lifestyle   ? Physical activity:     Days per week: Not on file     Minutes per session: Not on file   ? Stress: Not on file   Relationships   ? Social connections:     Talks on phone: Not on file     Gets together: Not on file     Attends Congregational service: Not on file     Active member of club or organization: Not on file     Attends meetings of clubs or organizations: Not on file     Relationship status: Not on file   ? Intimate partner violence:     Fear of current or ex partner: Not on file     Emotionally abused: Not on file     Physically abused: Not on file     Forced sexual activity: Not on file   Other Topics Concern   ? Not on file   Social History Narrative     and lives in home with 3 flight of steps          Medications  Allergies   Current Outpatient Medications   Medication Sig Dispense Refill   ? acetaminophen (TYLENOL) 500 MG tablet Take 1-2 tablets (500-1,000 mg total) by mouth every 6 (six) hours as needed.  0   ? ascorbic acid, vitamin C, (VITAMIN C) 1000 MG tablet Take 1,000 mg by mouth daily.     ? aspirin 81 MG EC tablet Take 81 mg by mouth daily.     ? azelastine (ASTELIN) 137 mcg (0.1 %) nasal spray 2 sprays into each nostril daily.     ? azithromycin (ZITHROMAX) 250 MG tablet Take every other day as instructed by pulmonologist 30 tablet 0   ? benzonatate (TESSALON) 200 MG capsule Take 1 capsule (200 mg total) by mouth 3 (three) times a day. 60 capsule 2   ? cholecalciferol, vitamin D3, 1,000 unit tablet Take 1,000 Units by mouth daily.     ? cyanocobalamin, vitamin B-12, (VITAMIN B-12 ORAL) Take 1,000 mcg by mouth daily.            ?  fluticasone (FLONASE) 50 mcg/actuation nasal spray Apply 1 spray into each nostril at bedtime.     ? fluticasone-salmeterol (ADVAIR) 500-50 mcg/dose DISKUS Inhale 1 puff 2 (two) times a day.     ? furosemide (LASIX) 20 MG tablet Take 10 mg by mouth daily.            ? gabapentin (NEURONTIN) 300 MG capsule Take 1 capsule (300 mg total) by mouth 2 (two) times a day. 180 capsule 1   ? ipratropium (ATROVENT HFA) 17 mcg/actuation inhaler Inhale 2 puffs every 6 (six) hours. 3 Inhaler 3   ? ipratropium-albuterol (DUO-NEB) 0.5-2.5 mg/3 mL nebulizer 1 neb 4 times a day until seen by primary and then as needed or as directed 60 vial 1   ? lidocaine (LIDODERM) 5 % Place 1 patch on the skin daily. To left hip 30 patch 1   ? losartan (COZAAR) 25 MG tablet Take 1 tablet (25 mg total) by mouth daily. 90 tablet 1   ? metoprolol succinate (TOPROL-XL) 100 MG 24 hr tablet Take 1 tablet (100 mg total) by mouth at bedtime. 90 tablet 3   ? mirabegron (MYRBETRIQ) 50 mg Tb24 ER tablet Take 1 tablet (50 mg total) by mouth daily. 90 tablet 1   ? PROVENTIL HFA 90 mcg/actuation inhaler Inhale 1 puff every 6 (six) hours as needed.      ? simvastatin (ZOCOR) 40 MG tablet Take 40 mg by mouth bedtime.     ? solifenacin (VESICARE) 5 MG tablet TAKE 1 TABLET AT BEDTIME (Patient taking differently: Take 5 mg by mouth 2 (two) times a day.       ) 90 tablet 3   ? tamoxifen (NOLVADEX) 20 MG tablet Take 20 mg by mouth at bedtime.            ? traZODone (DESYREL) 50 MG tablet Take 1 tablet (50 mg total) by mouth at bedtime as needed. 90 tablet 1   ? venlafaxine (EFFEXOR-XR) 150 MG 24 hr capsule TAKE 1 CAPSULE DAILY 90 capsule 4   ? cetirizine (ZYRTEC) 10 MG tablet Take 10 mg by mouth daily.            ? chlorhexidine (PERIDEX) 0.12 % solution Apply 15 mL to the mouth or throat at bedtime.            ? codeine-guaiFENesin (GUAIFENESIN AC)  mg/5 mL liquid Take 5 mL by mouth 3 (three) times a day as needed for cough. 236 mL 0   ? famotidine (PEPCID) 20  MG tablet Take 1 tablet by mouth 2 (two) times a day.     ? meclizine (ANTIVERT) 25 mg tablet Take 1 tablet (25 mg total) by mouth 3 (three) times a day as needed for dizziness. 30 tablet 0   ? MYRBETRIQ 25 mg Tb24 ER tablet Take 25 mg by mouth daily.            ? pantoprazole (PROTONIX) 40 MG tablet Take 40 mg by mouth daily as needed.            ? ranitidine (ZANTAC) 150 MG tablet Take 150 mg by mouth daily.       No current facility-administered medications for this visit.       Allergies   Allergen Reactions   ? Sulfa (Sulfonamide Antibiotics) Rash     Headaches and dizziness.   ? Homeopathic Drugs Unknown and Other (See Comments)     Comment: runny nose, Comment: runny nose   ? Mold Extracts    ? Morphine (Pf)      hallucinate   ? Lisinopril Cough, Unknown and Itching     Comment: cough, Comment: cough   ? Sulfacetamide Sodium Rash         Lab Results    Chemistry/lipid CBC Cardiac Enzymes/BNP/TSH/INR   Lab Results   Component Value Date    CREATININE 1.19 (H) 08/19/2019    BUN 11 08/19/2019    K 4.1 08/19/2019     08/19/2019     08/19/2019    CO2 27 08/19/2019    Lab Results   Component Value Date    WBC 9.9 06/11/2019    HGB 13.2 10/04/2019    HCT 42.9 06/11/2019    MCV 90 06/11/2019     06/11/2019    Lab Results   Component Value Date    CKMB <1 08/05/2011    TROPONINI 0.06 02/25/2019     (H) 04/17/2019    TSH 0.57 11/01/2015    INR 3.33 (H) 11/13/2017

## 2021-06-28 NOTE — PROGRESS NOTES
Progress Notes by David Fortune PT Student at 1/29/2020 11:00 AM     Author: David Fortune PT Student Service: -- Author Type: PT Student    Filed: 1/29/2020  1:35 PM Encounter Date: 1/29/2020 Status: Attested    : David Fortune PT Student (PT Student) Cosigner: Audrey Espana PT at 1/29/2020  1:42 PM    Attestation signed by Audrey Espana PT at 1/29/2020  1:42 PM    I attest that I was present in the room, making skilled assessments and directing the service provided today.  I was responsible for the assessment and treatment of the patient.  During this time, I was not engaged in treating another patient or doing other tasks.    Audrey Espana PT                [facility] Daily Progress Note    Patient Name: Irene León  Date: 1/29/2020  Visit #: 2/12  Referring provider: Apolinar Mayo*  Visit Diagnosis:     ICD-10-CM    1. Chronic left hip pain M25.552     G89.29    2. Abnormality of gait R26.9    3. Chronic bilateral low back pain without sciatica M54.5     G89.29    4. Muscle weakness (generalized) M62.81    5. Weakness of left hip R29.898          Assessment:     Patient had significant low back pain today when getting into the supine position on the plinth. This was alleviated by her performing LTR's and an abdominal set 1 x10. Patient stated that during the soft tissue massage, it felt painful on the left IT band insertion near the lateral knee. Patient had significant pain today and was limping when ambulating. She states that therapy is helping and she wants to continue with therapy.     HEP/POC compliance is  good .  Patient demonstrates understanding/independence with home program.  Patient is benefitting from skilled physical therapy and is making steady progress toward functional goals.    Goal Status:  Pt. will demonstrate/verbalize independence in self-management of condition in : 6 weeks  Pt. will be independent with home exercise program in : 6  "weeks  Pt. will be able to walk : 20 minutes;around the house;with less pain;for household mobility;in 6 weeks    Pt will: demonstrate an ability to perform a sit to stand from a chair with left hip pain less than 3/10 within 6 weeks so that she can tolerate getting up and down from a chair at home   Pt will: Patient will improve her sit to stand score to >=10 reps on the 30 second sit to  order to reduce her fall risk within 6 weeks.       Plan / Patient Education:     Progress with home program as tolerated.   Plan for next session: Progress with soft tissue massage and progress exercises for core and leg strengthening.     Subjective:   Patient states that the bridge made her muscles a little sore. Patient states she was able to complete the exercises 2x per day and she felt the same. She does not have questions about the exercises. Patient notes she has issues with front quad with some delayed onset muscle soreness.   Pain Ratin  7/10 in the right glut and quad regions    Objective:     Increased pain with supine positioning. Patient had increased tenderness over the left greater trochanter as well and stated \"it felt like being stabbed with a knife.\"     Treatment Today     Exercises: Exercises: (see flowsheet for updated exercises)  Exercise #1: Supine LTR bilaterally 1x5  Comment #1:  Supine hip bridges 1x10  Exercise #2: Seated hip marches 1x10  Comment #2: Seated hip abduction with yellow band 1x 10  Exercise #3: Long arc quad bilaterally 1x10    Manual therapy:  Soft tissue massage of the left hamstring, IT band, and hip flexor regions.     TREATMENT MINUTES COMMENTS   Evaluation     Self-care/ Home management     Manual therapy 20 See above   Neuromuscular Re-education     Therapeutic Activity     Therapeutic Exercises 10 See flowsheet   Gait training     Modality__________________                Total 30    Blank areas are intentional and mean the treatment did not include these items. "       David Fortune, PT Student   1/29/2020

## 2021-06-28 NOTE — PROGRESS NOTES
Progress Notes by David Fortune, PT Student at 1/31/2020  2:00 PM     Author: David Fortune, PT Student Service: -- Author Type: PT Student    Filed: 1/31/2020  3:03 PM Encounter Date: 1/31/2020 Status: Attested    : David Fortune, PT Student (PT Student) Cosigner: Emir Hendrickson, PT at 1/31/2020  3:29 PM    Attestation signed by Emir Hendrickson, PT at 1/31/2020  3:29 PM    I attest that I was present in the room, making skilled assessments and directing the service provided today.  I was responsible for the assessment and treatment of the patient.  During this time, I was not engaged in treating another patient or doing other tasks.  Emir Hendrickson, PT                    01/31/20 3255   Exercise #1   Exercise #1 Supine LTR bilaterally 1x15-20 did today   Exercise #2   Exercise #2 Seated hip marches 1x10 today   Comment #2 Seated hip abduction with yellow band 2x10   Exercise #3   Exercise #3 Long arc quad bilaterally - 1x10    Comment #3 Nustep- 3 minutes   Exercise #4   Exercise #4 Standing hip extension 1x10 with left leg.    Exercise #5   Exercise #5 Ab set 1x10 with 5 sec hold

## 2021-06-28 NOTE — PROGRESS NOTES
Progress Notes by David Fortune, PT Student at 1/29/2020 11:00 AM     Author: David Fortune, PT Student Service: -- Author Type: PT Student    Filed: 1/29/2020  1:33 PM Encounter Date: 1/29/2020 Status: Attested    : David Fortune, PT Student (PT Student) Cosigner: Audrey Espana PT at 1/29/2020  1:38 PM    Attestation signed by Audrey Espana PT at 1/29/2020  1:38 PM    I attest that I was present in the room, making skilled assessments and directing the service provided today.  I was responsible for the assessment and treatment of the patient.  During this time, I was not engaged in treating another patient or doing other tasks.    Audrey Espana, PT                    01/29/20 1106   Exercise #1   Exercise #1 Supine LTR bilaterally 1x15-20   Exercise #2   Exercise #2 Seated hip marches    Comment #2 Seated hip abduction with yellow band    Exercise #3   Exercise #3 Long arc quad bilaterally    Comment #3 Nustep- 3.5 minutes

## 2021-06-28 NOTE — PROGRESS NOTES
Progress Notes by David Fortune, PT Student at 2/20/2020  2:00 PM     Author: David Fortune PT Student Service: -- Author Type: PT Student    Filed: 2/20/2020  2:43 PM Encounter Date: 2/20/2020 Status: Attested    : David Fortune, PT Student (PT Student) Cosigner: Emir Hendrickson, PT at 2/20/2020  2:48 PM    Attestation signed by Emir Hendrickson PT at 2/20/2020  2:48 PM    I attest that I was present in the room, making skilled assessments and directing the service provided today.  I was responsible for the assessment and treatment of the patient.  During this time, I was not engaged in treating another patient or doing other tasks.  Emir Hendrickson PT                 [facility] Daily Progress Note    Patient Name: Irene León   Date: 2/20/2020  Visit #: 8/12  Referring provider: Apolinar Mayo*  Visit Diagnosis:     ICD-10-CM    1. Chronic left hip pain M25.552     G89.29    2. Abnormality of gait R26.9    3. Chronic bilateral low back pain without sciatica M54.5     G89.29    4. Weakness of left hip R29.898    5. Muscle weakness (generalized) M62.81          Assessment:     From eval:   Patient is a 74 year old female who presents to PT with low back pain and left hip pain. She has had this left hip pain for the last 4-5 years following surgeries of a RAY and repair of a greater trochanter and femur fracture. Patient presents to therapy with impairments in LE strength, soft tissue tenderness, pain in the left hip and lumbar region, and functional mobility limitations with decreased balance. Patient will benefit from skilled therapy in order to make improvements in these areas. Therapy initiated exercises today and patient was able to tolerate the exercises well.     Patient tolerated manual therapy and exercises well, but had moderate pain today. She states now that her anterior thigh hurts when she bends her knees up or extends her back. Patient was educated  about the benefits of movement for our joints and that walking is a good thing in moderation. Patient is pleased with her results in physical therapy up to this point, but fears when she stops physical therapy she will get very painful again.   Patient continues to state that she is very tender to palpation and that even the slightest movements with pain cause her to have fear about the disease progression in her hips.     HEP/POC compliance is  good .  Patient demonstrates understanding/independence with home program.  Patient is benefitting from skilled physical therapy and is making steady progress toward functional goals.    Goal Status:  Pt. will demonstrate/verbalize independence in self-management of condition in : 6 weeks  Pt. will be independent with home exercise program in : 6 weeks  Pt. will be able to walk : 20 minutes;around the house;with less pain;for household mobility;in 6 weeks    Pt will: demonstrate an ability to perform a sit to stand from a chair with left hip pain less than 3/10 within 6 weeks so that she can tolerate getting up and down from a chair at home   Pt will: Patient will improve her sit to stand score to >=10 reps on the 30 second sit to  order to reduce her fall risk within 6 weeks.       Plan / Patient Education:     Progress with home program as tolerated.     Plan for next session: Progress with soft tissue massage and progress exercises for core and leg strengthening.     Subjective:     Hip flexors are a little sore, as patient has been doing stairs lately.   Patient has been doing the exercises twice a day. The marching hurts her quads a little bit. Patient feels that therapy is helpful, but she does not want to go back to where she was, with a lot of pain.     Pain Ratin/10       Objective:     Patient with decreased overall mobility due to stiffness and pain.      Patient with significant LE and core weakness as demonstrated by movement and exercises.     Pt  "with increased tightness and tenderness over:  L anterior thigh = moderate+  L ITB = moderate+    Treatment Today     Exercises: Exercises: (see flowsheet for updated exercises)  Exercise #1: Supine LTR bilaterally Bx10   Comment #1:  Supine hip bridges 1x10  Exercise #2: Seated hip marches - supine today Bx12 - modified to heel slide with ab set initiation 1x10 bilat.- not today  Comment #2: Seated hip abduction with yellow band - in supine today Bx12- not today  Exercise #3: Long arc quad bilaterally 1x15  Comment #3: Nustep- 4 mins - at level 4   Exercise #4: Standing hip extension - not today  Comment #4: Manual stretching hip flexors, quads, hamstrings, piriformis 30\" x 2 bilateral  Exercise #5: Bridges 1x10 with cues breath    Note: Today 2/20/2020 PT only stretched patient's bilateral piriformis and hamstrings, did not stretch quads or hip flexors. These areas were addressed in manual therapy MFR.    Manual therapy:  MFR layers 1-3 bilateral: left anterior thigh and TFL/ITB patient supine(hooklying)    TREATMENT MINUTES COMMENTS   Evaluation     Self-care/ Home management     Manual therapy 15 See above   Neuromuscular Re-education     Therapeutic Activity     Therapeutic Exercises 15 See above   Gait training     Modality__________________                Total 30    Blank areas are intentional and mean the treatment did not include these items.       David Fortune, PT Student   2/20/2020       "

## 2021-06-29 NOTE — PROGRESS NOTES
Progress Notes by Gaby Chilel MD at 6/22/2020 11:20 AM     Author: Gaby Chilel MD Service: -- Author Type: Physician    Filed: 6/22/2020 12:21 PM Encounter Date: 6/22/2020 Status: Signed    : Gaby Chilel MD (Physician)           Thank you, Dr. Montanez, for asking the [facility] team to see Ms. Irene León to evaluate her nonischemic cardiomyopathy.    Assessment/Recommendations   Assessment:    1.  Nonischemic cardiomyopathy, EF 39% by last echocardiogram in November 2019.  She reports no symptoms of orthopnea, PND or worsening lower extremity edema.  Her weight is unchanged from the time of that last visit.  She has noted some increased exertional dyspnea and I recommended that we check BNP and basic metabolic profile to verify that she is fairly euvolemic.  We will call her with those results and any adjustment in her diuretic therapy.  2.  Dyspnea on exertion, likely multifactorial.  I suspect this is due to combination of her nonischemic cardiomyopathy, emphysema as well as an element of cardiovascular deconditioning.  She was recently started on a treatment by her pulmonologist to see if that may help with any reactive airway disease from allergies.  She has had several treatments but has noted no significant improvement.  Again we will reassess her heart failure status.  3.  Coronary artery disease, very mild by angiography in 2003.  She reports no clear anginal symptoms but could consider stress testing if etiology of her exertional dyspnea remains unclear.  4.  Bradycardia, resolved with decreased metoprolol dose.    Plan:  1.  Continue current medications  2.  Obtain BNP and BMP today with further recommendations to follow       History of Present Illness    Ms. Irene León is a 75 y.o. female with history of very mild coronary artery disease by angiography in 2003, nonischemic cardiomyopathy with ejection fraction of 39% and emphysema who presents to the clinic today  for a follow-up visit.  She was recently seen by her pulmonologist who is concerned about her continued dyspnea and finding of mild hypoxemia in the office.  Because of question of some reactive airway disease, patient tells me she was started on some new regimen although I do not see any clear documentation of this in the pulmonologists records.  She has had a total of 3 treatments without any significant benefit.  She denies to me any exertional chest discomfort, orthopnea, PND.  She does have chronic lower extremity edema which she tells me has not changed.  Her weight is actually unchanged from her visit in November.    ECG (personally reviewed): No ECG today    Cardiac Imaging Studies (personally reviewed): No recent echo     Physical Examination Review of Systems   Vitals:    06/22/20 1137   BP: 112/70   Pulse: 67   Resp: 20     Body mass index is 26.81 kg/m .  Wt Readings from Last 3 Encounters:   06/22/20 146 lb 9.6 oz (66.5 kg)   03/09/20 151 lb (68.5 kg)   02/14/20 147 lb 8 oz (66.9 kg)     General Appearance:   Awake, Alert, No acute distress.   HEENT:  No scleral icterus; the mucous membranes were pink and moist.   Neck: No cervical bruits or jugular venous distention    Chest: The spine was straight. The chest was symmetric.   Lungs:   Respirations unlabored; the lungs are clear to auscultation. No wheezing   Cardiovascular:    Regular rate and rhythm with occasional ectopic beat.  S1, S2 normal.  No murmur or gallop   Abdomen:  No organomegaly, masses, bruits, or tenderness. Bowels sounds are present   Extremities:  1+ edema both lower extremities to the mid shin.  Ecchymosis noted over both shins.   Skin: No xanthelasma. Warm, Dry.   Musculoskeletal: No tenderness.   Neurologic: Mood and affect are appropriate.    General: Night Sweats  Eyes: WNL  Ears/Nose/Throat: WNL  Lungs: Shortness of Breath  Heart: WNL  Stomach: WNL  Bladder: WNL(incontience)  Muscle/Joints: WNL  Skin: Poor Wound  Healing  Nervous System: WNL  Mental Health: WNL     Blood: WNL     Medical History  Surgical History Family History Social History   Past Medical History:   Diagnosis Date   ? Arrhythmia    ? Breast cancer (H) 2017   ? CHF (congestive heart failure) (H)    ? COPD (chronic obstructive pulmonary disease) (H)    ? Coronary artery disease    ? GERD (gastroesophageal reflux disease)    ? Hx of radiation therapy 2017   ? Hyperlipidemia    ? Hypertension    ? Idiopathic cardiomyopathy (H)     Past Surgical History:   Procedure Laterality Date   ? BREAST BIOPSY Right 2017   ? BREAST LUMPECTOMY Left 06/2017    x2   ? CARDIAC CATHETERIZATION     ? CATARACT EXTRACTION Left 2017   ? PA OPEN TX FEMORAL FRACTURE DISTAL MED/LAT CONDYLE Left 10/28/2015    Procedure: OPEN REDUCTION INTERNAL FIXATION LEFT  PROXIMAL FEMUR PERIPROSTHETIC FRACTURE;  Surgeon: Yovanny Albarran MD;  Location: [facility] Main OR;  Service: Orthopedics   ? REVISION TOTAL HIP ARTHROPLASTY Left     Family History   Problem Relation Age of Onset   ? Osteoporosis Other    ? Prostate cancer Brother    ? Breast cancer Maternal Aunt         age thought to be in her 70's-80's   ? Prostate cancer Maternal Uncle     Social History     Socioeconomic History   ? Marital status:      Spouse name: Not on file   ? Number of children: Not on file   ? Years of education: Not on file   ? Highest education level: Not on file   Occupational History   ? Not on file   Social Needs   ? Financial resource strain: Not on file   ? Food insecurity     Worry: Not on file     Inability: Not on file   ? Transportation needs     Medical: Not on file     Non-medical: Not on file   Tobacco Use   ? Smoking status: Former Smoker     Last attempt to quit: 10/28/2007     Years since quittin.6   ? Smokeless tobacco: Former User     Quit date: 2004   Substance and Sexual Activity   ? Alcohol use: No   ? Drug use: No   ? Sexual activity: Not on file   Lifestyle   ? Physical  activity     Days per week: Not on file     Minutes per session: Not on file   ? Stress: Not on file   Relationships   ? Social connections     Talks on phone: Not on file     Gets together: Not on file     Attends Christianity service: Not on file     Active member of club or organization: Not on file     Attends meetings of clubs or organizations: Not on file     Relationship status: Not on file   ? Intimate partner violence     Fear of current or ex partner: Not on file     Emotionally abused: Not on file     Physically abused: Not on file     Forced sexual activity: Not on file   Other Topics Concern   ? Not on file   Social History Narrative     and lives in home with 3 flight of steps          Medications  Allergies   Current Outpatient Medications   Medication Sig Dispense Refill   ? acetaminophen (TYLENOL) 500 MG tablet Take 1-2 tablets (500-1,000 mg total) by mouth every 6 (six) hours as needed.  0   ? ascorbic acid, vitamin C, (VITAMIN C) 1000 MG tablet Take 1,000 mg by mouth daily. Airborne 1 tab daily     ? aspirin 81 MG EC tablet Take 81 mg by mouth daily.     ? azelastine (ASTELIN) 137 mcg (0.1 %) nasal spray 2 sprays into each nostril daily.     ? azithromycin (ZITHROMAX) 250 MG tablet Take 250 mg by mouth daily.     ? benzonatate (TESSALON) 200 MG capsule Take 1 capsule (200 mg total) by mouth 3 (three) times a day. 60 capsule 2   ? chlorhexidine (PERIDEX) 0.12 % solution Apply 15 mL to the mouth or throat at bedtime.            ? cholecalciferol, vitamin D3, 1,000 unit tablet Take 1,000 Units by mouth daily.     ? cyanocobalamin, vitamin B-12, (VITAMIN B-12 ORAL) Take 1,000 mcg by mouth daily.            ? EPINEPHrine (EPIPEN/ADRENACLICK/AUVI-Q) 0.3 mg/0.3 mL injection Inject 0.3 mg into the shoulder, thigh, or buttocks.     ? famotidine (PEPCID) 20 MG tablet Take 1 tablet by mouth 2 (two) times a day.     ? fluticasone (FLONASE) 50 mcg/actuation nasal spray Apply 1 spray into each nostril as  needed.      ? fluticasone-salmeterol (ADVAIR) 500-50 mcg/dose DISKUS Inhale 1 puff 2 (two) times a day.     ? furosemide (LASIX) 20 MG tablet Take 1 tablet (20 mg total) by mouth 2 (two) times a day at 9am and 6pm. (Patient taking differently: Take 20 mg by mouth daily. ) 180 tablet 3   ? gabapentin (NEURONTIN) 300 MG capsule Take 1 capsule (300 mg total) by mouth 2 (two) times a day. (Patient taking differently: Take 600 mg by mouth 2 (two) times a day. ) 180 capsule 1   ? ipratropium (ATROVENT HFA) 17 mcg/actuation inhaler Inhale 2 puffs every 6 (six) hours. 3 Inhaler 3   ? ipratropium-albuterol (DUO-NEB) 0.5-2.5 mg/3 mL nebulizer 1 neb 4 times a day until seen by primary and then as needed or as directed 60 vial 1   ? lidocaine (LIDODERM) 5 % Place 1 patch on the skin daily. To left hip (Patient taking differently: Place 1 patch on the skin daily as needed. To left hip) 30 patch 3   ? losartan (COZAAR) 50 MG tablet TAKE 1 TABLET DAILY 90 tablet 3   ? metoprolol succinate (TOPROL-XL) 50 MG 24 hr tablet Take 1.5 tablets (75 mg total) by mouth daily. 135 tablet 3   ? mirabegron (MYRBETRIQ) 50 mg Tb24 ER tablet Take 1 tablet (50 mg total) by mouth daily. 90 tablet 3   ? NON FORMULARY Take 1 Tablet 24 Hour Sustained Release by mouth daily. Airborne, 1 tablet orally daily     ? omalizumab (XOLAIR) 150 mg/mL Syrg injection Inject 300 mg under the skin.     ? pantoprazole (PROTONIX) 40 MG tablet Take 1 tablet (40 mg total) by mouth daily. 90 tablet 4   ? PROVENTIL HFA 90 mcg/actuation inhaler Inhale 1 puff every 6 (six) hours as needed.      ? simvastatin (ZOCOR) 40 MG tablet Take 1 tablet (40 mg total) by mouth at bedtime. 90 tablet 3   ? sodium chloride (OCEAN) 0.65 % nasal spray 2 sprays into each nostril as needed.     ? solifenacin (VESICARE) 5 MG tablet Take 1 tablet (5 mg total) by mouth at bedtime. 90 tablet 3   ? tamoxifen (NOLVADEX) 20 MG tablet TAKE 1 TABLET DAILY 90 tablet 4   ? traZODone (DESYREL) 50 MG  tablet Take 1 tablet (50 mg total) by mouth at bedtime as needed. 90 tablet 1   ? triamcinolone (KENALOG) 0.1 % cream Apply to rash on arms twice daily for one week 30 g 0   ? triamcinolone (KENALOG) 0.5 % ointment Apply o foot twice a day 30 g 1   ? venlafaxine (EFFEXOR-XR) 150 MG 24 hr capsule TAKE 1 CAPSULE DAILY 90 capsule 4   ? traMADoL (ULTRAM) 50 mg tablet Take 1 tablet (50 mg total) by mouth 2 (two) times a day. 60 tablet 1     Current Facility-Administered Medications   Medication Dose Route Frequency Provider Last Rate Last Dose   ? denosumab 60 mg (PROLIA 60 mg/ml)  60 mg Subcutaneous Q6 Months Suzanna Shane MD   60 mg at 02/04/20 1352      Allergies   Allergen Reactions   ? Sulfa (Sulfonamide Antibiotics) Rash     Headaches and dizziness.   ? Homeopathic Drugs Unknown and Other (See Comments)     Comment: runny nose, Comment: runny nose   ? Mold Extracts    ? Morphine (Pf)      hallucinate   ? Lisinopril Cough, Unknown and Itching     Comment: cough, Comment: cough   ? Sulfacetamide Sodium Rash         Lab Results    Chemistry/lipid CBC Cardiac Enzymes/BNP/TSH/INR   Lab Results   Component Value Date    CREATININE 1.42 (H) 03/12/2020    BUN 16 03/12/2020    K 4.2 03/12/2020     03/12/2020     03/12/2020    CO2 21 (L) 03/12/2020    Lab Results   Component Value Date    WBC 9.9 06/11/2019    HGB 13.2 10/04/2019    HCT 42.9 06/11/2019    MCV 90 06/11/2019     06/11/2019    Lab Results   Component Value Date    CKMB <1 08/05/2011    TROPONINI 0.06 02/25/2019    BNP 1,019 (H) 11/01/2019    TSH 0.57 11/01/2015    INR 3.33 (H) 11/13/2017

## 2021-06-30 NOTE — PROGRESS NOTES
Progress Notes by Gaby Chilel MD at 12/29/2020  3:30 PM     Author: Gaby Chilel MD Service: -- Author Type: Physician    Filed: 12/29/2020  4:25 PM Encounter Date: 12/29/2020 Status: Signed    : Gaby Chilel MD (Physician)           Thank you, Dr. Montanez, for asking the [facility] Heart Care team to see Ms. Irene León to follow-up on recent hospitalization for CHF exacerbation.    Assessment/Recommendations   Assessment:    1.  Acute on chronic systolic congestive heart failure exacerbation, resolved in the hospital with IV diuretic therapy.  She was subsequently discharged to transitional care on furosemide 40 mg twice daily but this was subsequently switched to Bumex during her TCU stay due to weight gain and lower extremity edema.  After her return home, she elected to switch back to the furosemide and is currently taking 40 mg once to twice daily.  She reports no orthopnea or PND but does have evidence of ankle edema.  Weight is actually down from her discharge.  At this point we will continue with current medical therapy.  2.  Bradycardia suggested by examination.  Subsequent ECG demonstrates sinus rhythm with frequent PACs at a rate of 58.  We will hold on cutting back beta-blocker therapy as I am concerned she may develop atrial fibrillation given her underlying structural heart disease.  She will be following up next week with our heart failure nurse practitioner, Samantha, who can reassess at that time.  3.  Nonischemic cardiomyopathy, EF approximately 40% based on recent echocardiogram.  She does have underlying left bundle branch block pattern and may be a candidate for resynchronization therapy.  Did not discuss this with her today as she is having significant pain related to her compression fracture.  This could be discussion for future visits.      Plan:  1.  Continue current medications for now  2.  Follow-up with Samantha in the clinic next week as previously scheduled.        History of Present Illness    Ms. Irene León is a 75 y.o. female with history of nonischemic cardiomyopathy with minimal coronary artery disease by angiography in 2003 and negative nuclear stress test in 2016, emphysema, osteoporosis with recent compression fracture who was hospitalized for severe back pain in late October.  She was diagnosed with a compression fracture but also noted to be in mild acute congestive heart failure.  She was treated with IV diuretic therapy with marked improvement in symptoms.  My partner saw her in consultation and recommended no change in medications at that time.  She was subsequently discharged to transitional care where she remained for approximately 2 to 3 weeks and is now back home with her .  At the time of her visit today, she is in significant pain related to her compression fracture.  She denies orthopnea or PND.  Does have a little ankle edema.  States her weights have been stable since her discharge from the TCU.  Tells me she did switch from the Bumex back to furosemide as she is more familiar with furosemide.     ECG (personally reviewed): ECG done today for bradycardia demonstrates sinus rhythm with PACs and occasional PVC, rate 68.  There is left axis deviation and left bundle branch block pattern.    Cardiac Imaging Studies (personally reviewed): Recent echocardiogram in October demonstrated moderate global left ventricular dysfunction with ejection fraction of 40%.  Abnormal septal motion consistent with left bundle branch block pattern.  Moderate elevation in pulmonary artery pressures noted.       Physical Examination Review of Systems   Vitals:    12/29/20 1522   BP: 95/60   Pulse: (!) 49   Resp: 16     Body mass index is 25.24 kg/m .  Wt Readings from Last 3 Encounters:   12/29/20 138 lb (62.6 kg)   12/15/20 144 lb 4.8 oz (65.5 kg)   10/22/20 138 lb 8 oz (62.8 kg)     General Appearance:   Awake, Alert, No acute distress.   HEENT:  No scleral  icterus; the mucous membranes were pink and moist.   Neck: No cervical bruits or jugular venous distention at 90 degrees   Chest: The spine was straight. The chest was symmetric.   Lungs:   Respirations unlabored; the lungs are clear to auscultation. No wheezing   Cardiovascular:    Regular rhythm with occasional ectopic beat.  S1, S2 normal.  No murmur or gallop.   Abdomen:  No organomegaly, masses, bruits, or tenderness. Bowels sounds are present   Extremities:  1+ ankle edema bilaterally.   Skin: No xanthelasma. Warm, Dry.   Musculoskeletal: No tenderness.   Neurologic: Mood and affect are appropriate.    General: WNL  Eyes: WNL  Ears/Nose/Throat: WNL  Lungs: WNL  Heart: WNL  Stomach: WNL  Bladder: WNL  Muscle/Joints: WNL  Skin: WNL  Nervous System: WNL  Mental Health: WNL     Blood: WNL     Medical History  Surgical History Family History Social History   Past Medical History:   Diagnosis Date   ? Arrhythmia    ? Breast cancer (H) 2017   ? CHF (congestive heart failure) (H)    ? COPD (chronic obstructive pulmonary disease) (H)    ? Coronary artery disease    ? GERD (gastroesophageal reflux disease)    ? Hx of radiation therapy 2017   ? Hyperlipidemia    ? Hypertension    ? Idiopathic cardiomyopathy (H)     Past Surgical History:   Procedure Laterality Date   ? BREAST BIOPSY Right 2017   ? BREAST LUMPECTOMY Left 06/2017    x2   ? CARDIAC CATHETERIZATION     ? CATARACT EXTRACTION Left 07/18/2017   ? [place] OPEN TX FEMORAL FRACTURE DISTAL MED/LAT CONDYLE Left 10/28/2015    Procedure: OPEN REDUCTION INTERNAL FIXATION LEFT  PROXIMAL FEMUR PERIPROSTHETIC FRACTURE;  Surgeon: Yovanny Albarran MD;  Location: [facility] OR;  Service: Orthopedics   ? REVISION TOTAL HIP ARTHROPLASTY Left     Family History   Problem Relation Age of Onset   ? Osteoporosis Other    ? Prostate cancer Brother    ? Breast cancer Maternal Aunt         age thought to be in her 70's-80's   ? Prostate cancer Maternal Uncle     Social History      Socioeconomic History   ? Marital status:      Spouse name: Not on file   ? Number of children: Not on file   ? Years of education: Not on file   ? Highest education level: Not on file   Occupational History   ? Not on file   Social Needs   ? Financial resource strain: Not on file   ? Food insecurity     Worry: Not on file     Inability: Not on file   ? Transportation needs     Medical: Not on file     Non-medical: Not on file   Tobacco Use   ? Smoking status: Former Smoker     Quit date: 10/28/2007     Years since quittin.1   ? Smokeless tobacco: Former User     Quit date: 2004   Substance and Sexual Activity   ? Alcohol use: No   ? Drug use: No   ? Sexual activity: Not on file   Lifestyle   ? Physical activity     Days per week: Not on file     Minutes per session: Not on file   ? Stress: Not on file   Relationships   ? Social connections     Talks on phone: Not on file     Gets together: Not on file     Attends Mu-ism service: Not on file     Active member of club or organization: Not on file     Attends meetings of clubs or organizations: Not on file     Relationship status: Not on file   ? Intimate partner violence     Fear of current or ex partner: Not on file     Emotionally abused: Not on file     Physically abused: Not on file     Forced sexual activity: Not on file   Other Topics Concern   ? Not on file   Social History Narrative     and lives in home with 3 flight of steps          Medications  Allergies   Current Outpatient Medications   Medication Sig Dispense Refill   ? acetaminophen (TYLENOL) 325 MG tablet Take 2 tablets (650 mg total) by mouth 3 (three) times a day. (Patient taking differently: Take 650 mg by mouth 4 (four) times a day. )  0   ? ADVAIR DISKUS 500-50 mcg/dose DISKUS USE 1 INHALATION TWICE A DAY 60 each 11   ? albuterol (PROAIR HFA;PROVENTIL HFA;VENTOLIN HFA) 90 mcg/actuation inhaler Inhale 2 puffs every 4 (four) hours as needed for wheezing. 1 Inhaler 0    ? ascorbic acid, vitamin C, (VITAMIN C) 1000 MG tablet Take 1,000 mg by mouth daily. Airborne 1 tab daily     ? aspirin 81 MG EC tablet Take 81 mg by mouth daily.     ? azelastine (ASTELIN) 137 mcg (0.1 %) nasal spray 2 sprays into each nostril daily.     ? benzonatate (TESSALON) 200 MG capsule Take 1 capsule (200 mg total) by mouth 3 (three) times a day as needed for cough. 90 capsule 2   ? chlorhexidine (PERIDEX) 0.12 % solution Apply 15 mL to the mouth or throat at bedtime as needed.      ? cholecalciferol, vitamin D3, 1,000 unit tablet Take 1,000 Units by mouth daily.     ? diclofenac sodium (VOLTAREN) 1 % Gel Apply 4 g to area of pain in low back 4 times daily  0   ? EPINEPHrine (EPIPEN/ADRENACLICK/AUVI-Q) 0.3 mg/0.3 mL injection Inject 0.3 mg into the shoulder, thigh, or buttocks.     ? famotidine (PEPCID) 20 MG tablet Take 20 mg by mouth daily.     ? fluticasone (FLONASE) 50 mcg/actuation nasal spray Apply 1 spray into each nostril as needed.      ? furosemide (LASIX) 80 MG tablet Take 40 mg by mouth 2 (two) times a day at 9am and 6pm.      ? gabapentin (NEURONTIN) 300 MG capsule Take 2 capsules (600 mg total) by mouth 2 (two) times a day. 360 capsule 2   ? ipratropium (ATROVENT HFA) 17 mcg/actuation inhaler Inhale 2 puffs every 6 (six) hours. 3 Inhaler 3   ? ipratropium-albuterol (DUO-NEB) 0.5-2.5 mg/3 mL nebulizer 1 neb 4 times a day until seen by primary and then as needed or as directed 60 vial 1   ? lidocaine (XYLOCAINE) 5 % ointment Apply to area of pain in low back 4 times daily 35.44 g 0   ? losartan (COZAAR) 50 MG tablet TAKE 1 TABLET DAILY 90 tablet 3   ? melatonin 3 mg Tab tablet Take 1 tablet (3 mg total) by mouth at bedtime.  0   ? metoprolol succinate (TOPROL-XL) 50 MG 24 hr tablet Take 1.5 tablets (75 mg total) by mouth daily. 135 tablet 3   ? mirabegron (MYRBETRIQ) 50 mg Tb24 ER tablet Take 1 tablet (50 mg total) by mouth daily. 90 tablet 3   ? omalizumab (XOLAIR) 150 mg/mL Syrg injection  Inject 300 mg under the skin every 14 (fourteen) days.      ? oxyCODONE-acetaminophen (PERCOCET/ENDOCET) 5-325 mg per tablet TK 1 T PO Q 12 PRN     ? pantoprazole (PROTONIX) 40 MG tablet Take 1 tablet (40 mg total) by mouth daily. 90 tablet 4   ? polyethylene glycol (MIRALAX) 17 gram packet Take 1 packet (17 g total) by mouth daily as needed.  0   ? rivaroxaban ANTICOAGULANT (XARELTO) 20 mg tablet Take 20 mg by mouth at bedtime.     ? senna-docusate (PERICOLACE) 8.6-50 mg tablet Take 1 tablet by mouth daily as needed for constipation.  0   ? simvastatin (ZOCOR) 40 MG tablet Take 1 tablet (40 mg total) by mouth at bedtime. 90 tablet 3   ? sodium chloride (OCEAN) 0.65 % nasal spray 2 sprays into each nostril as needed.     ? solifenacin (VESICARE) 5 MG tablet Take 1 tablet (5 mg total) by mouth at bedtime. 90 tablet 3   ? tamoxifen (NOLVADEX) 20 MG tablet TAKE 1 TABLET DAILY 90 tablet 4   ? traZODone (DESYREL) 50 MG tablet TAKE 1 TABLET(50 MG) BY MOUTH AT BEDTIME AS NEEDED 90 tablet 2   ? venlafaxine (EFFEXOR-XR) 150 MG 24 hr capsule TAKE 1 CAPSULE DAILY 90 capsule 4     No current facility-administered medications for this visit.       Allergies   Allergen Reactions   ? Sulfa (Sulfonamide Antibiotics) Rash     Headaches and dizziness.   ? Homeopathic Drugs Unknown and Other (See Comments)     Comment: runny nose, Comment: runny nose   ? Mold Extracts    ? Morphine (Pf)      hallucinate   ? Lisinopril Cough, Unknown and Itching     Comment: cough, Comment: cough   ? Sulfacetamide Sodium Rash         Lab Results    Chemistry/lipid CBC Cardiac Enzymes/BNP/TSH/INR   Lab Results   Component Value Date    CREATININE 1.49 (H) 12/15/2020    BUN 17 12/15/2020    K 4.9 12/15/2020     12/15/2020     12/15/2020    CO2 27 12/15/2020    Lab Results   Component Value Date    WBC 10.8 10/26/2020    HGB 14.5 10/26/2020    HCT 45.7 10/26/2020    MCV 93 10/26/2020     10/26/2020    Lab Results   Component Value Date     CKMB <1 08/05/2011    TROPONINI 0.06 10/20/2020     (H) 10/20/2020    TSH 0.57 11/01/2015    INR 3.33 (H) 11/13/2017

## 2021-06-30 NOTE — PROGRESS NOTES
Progress Notes by Monet Byrd CNP at 2021  9:14 AM     Author: Monet Byrd CNP Service: -- Author Type: Nurse Practitioner    Filed: 2021  5:27 PM Encounter Date: 2021 Status: Signed    : Monet Byrd CNP (Nurse Practitioner)       [facility]      NAME:  Irene León             :  1945  MRN: [id number]  CODE STATUS:  FULL CODE    VISIT TYPE: DISCHARGE SUMMARY  FACILYTY: [facility] [[id number]]                    PRIMARY CARE PROVIDER: Pankaj Montanez MD    DISCHARGE DIAGNOSIS:      1. Pulmonary emphysema, unspecified emphysema type (H)    2. COPD exacerbation (H)    3. Compression fracture of T12 vertebra with routine healing, subsequent encounter         DISCHARGE MEDICATIONS:  Post Discharge Medication Reconciliation Status: discharge medications reconciled and changed, per note/orders       Medication List          Accurate as of 2021  5:20 PM. If you have any questions, ask your nurse or doctor.            CHANGE how you take these medications    furosemide 40 MG tablet  Commonly known as: LASIX  Take 1 tablet (40 mg total) by mouth 2 (two) times a day.  What changed:     how much to take    when to take this    additional instructions        CONTINUE taking these medications    acetaminophen 500 MG tablet  Commonly known as: TYLENOL     * albuterol 0.63 mg/3 mL nebulizer solution  Commonly known as: ACCUNEB     * albuterol 90 mcg/actuation inhaler  Commonly known as: PROAIR HFA;PROVENTIL HFA;VENTOLIN HFA  Inhale 2 puffs every 4 (four) hours as needed for wheezing.     azelastine 137 mcg (0.1 %) nasal spray  Commonly known as: ASTELIN     azithromycin 250 MG tablet  Commonly known as: ZITHROMAX  Take 1 tablet (250 mg total) by mouth at bedtime.     benzonatate 200 MG capsule  Commonly known as: TESSALON  Take 1 capsule (200 mg total) by mouth 3 (three) times a day as needed for cough.     chlorhexidine 0.12 %  solution  Commonly known as: PERIDEX     cholecalciferol (vitamin D3) 1,000 unit (25 mcg) tablet     diclofenac sodium 1 % Gel  Commonly known as: VOLTAREN     EPINEPHrine 0.3 mg/0.3 mL injection  Commonly known as: EPIPEN/ADRENACLICK/AUVI-Q     fluticasone propionate 50 mcg/actuation nasal spray  Commonly known as: FLONASE     fluticasone-umeclidinium-vilanterol 100-62.5-25 mcg Dsdv inhaler  Commonly known as: TRELEGY ELLIPTA  Inhale 1 Inhalation daily.     gabapentin 300 MG capsule  Commonly known as: NEURONTIN  Take 2 capsules (600 mg total) by mouth 2 (two) times a day.     hydrOXYzine pamoate 25 MG capsule  Commonly known as: VISTARIL     loperamide 2 mg capsule  Commonly known as: IMODIUM     melatonin 3 mg Tab tablet  Take 1 tablet (3 mg total) by mouth at bedtime.     metoprolol succinate 50 MG 24 hr tablet  Commonly known as: TOPROL-XL     Myrbetriq 50 mg Tb24 ER tablet  Generic drug: mirabegron  TAKE 1 TABLET DAILY     pantoprazole 40 MG tablet  Commonly known as: PROTONIX  Take 1 tablet (40 mg total) by mouth daily.     polyethylene glycol 17 gram packet  Commonly known as: MIRALAX  Take 1 packet (17 g total) by mouth daily as needed.     rivaroxaban ANTICOAGULANT 15 mg tablet  Commonly known as: XARELTO     simvastatin 40 MG tablet  Commonly known as: ZOCOR  Take 1 tablet (40 mg total) by mouth at bedtime.     sodium chloride 0.65 % nasal spray  Commonly known as: OCEAN     solifenacin 5 MG tablet  Commonly known as: VESICARE  Take 1 tablet (5 mg total) by mouth at bedtime.     tamoxifen 20 MG tablet  Commonly known as: NOLVADEX  TAKE 1 TABLET DAILY     traMADoL 50 mg tablet  Commonly known as: ULTRAM  Take 1 tablet (50 mg total) by mouth every 6 (six) hours as needed for pain or severe pain (7-10).     venlafaxine 150 MG 24 hr capsule  Commonly known as: EFFEXOR-XR  TAKE 1 CAPSULE DAILY     vitamin C 1000 MG tablet  Generic drug: ascorbic acid (vitamin C)         * This list has 2 medication(s) that are  "the same as other medications prescribed for you. Read the directions carefully, and ask your doctor or other care provider to review them with you.            STOP taking these medications    ipratropium 17 mcg/actuation inhaler  Commonly known as: ATROVENT HFA            HISTORY OF PRESENT ILLNESS: Irene León is a 76 y.o. female is being seen for a face to face visit for an anticipated discontinue on 4/27/21. She will require a face to face visit for home health services. We reviewed meds and ordered a nebulizer for home. Per her EMR \" At [facility] from 3/23 to 3/30/2021 and per her EMR \"with PMH of COPD (on 2L at home) with two recent admissions at [facility], CHF (EF 40%), factor 5 Leiden on anticoagulation, CKD stage 3, breast cancer, depression and anxiety who presented to the ED 3/23 due to acute weakness and encephalopathy and found to have E. Coli pyelonephritis and bacteremia with severe sepsis:     Severe sepsis  Pyelonephritis   E. Coli bacteremia  Possible pneumonia  - improved with ceftriaxone; finished 7 day course     Chronic HFrEF with acute exacerbation.  - Echocardiogram this admit shows EF 45% w/elevated right atrial/ventricular pressures, near baseline per care everywhere (had similar EF on ECHO at outside hospital recently as well).  - on admission was hypovolemic and septic, but became hypervolemic following fluid resuscitation for sepsis  - Has diuresed well on IV lasix  - transition to orals 80/40 for discharge, which may need to be adjusted. Patient was on 80 daily at home, but per daughter this was not controlling her peripheral edema nor cough from pulmonary edema (daughter is RN) although patient wasn't that compliant.  - Toprol increased from 74 yo 100mg daily  - ARB on hold due to lower pressures (may need to resume after discharge) Was on Losartan 50mg PTA     Acute kidney injury likely 2/2 sepsis  -resolved     Hypomag: stable today  Hypokalemia: repleted     Diarrhea: new, started a few " days into hospitalization. C. Diff not sent per hospital protocol as no fever/abdominal pain and leukocytosis is improving; Most likely 2/2 antibiotics.Continue imodium; recommend c. Diff testing if diarrhea does not improve off of antibiotics.      Acute on Chronic hypoxemic respiratory failure due to COPD (on 2L at baseline) with pulmonary emphysema moderate to severe:  Has recurrent COPD exacerbation requiring hospitalization, most recently 3/9-3/11. COPD seemed stable here in hospital. Continued on inhalers. Weaned down to 2L of oxygen at discharge      L rib pain and T12 compression fracture  Since a fall 6 weeks ago, was on tramadol + lidocaine patches. Endorses ongoing severe pain. Oral dilaudid and lidocaine patch attempted in hospital after tramadol wasn't helping. However, this didn't seem to help wither.    D/w pain MD, Dr. Lima to get additional ideas to control her pain; he suggested abdominal binder and short course celebrex. Consider robaxin if this doesn't help.     Essential Hypertension:  - under control with metoprolol and diuresis  - hold ARB; resume if BP increases     Hyperlipidemia   -  statin     Overactive bladder  - Holding pta Myrbetriq ER + solifenacin (resume on discharge)      Depression and anxiety with insomnia:  - pta 150 mg venlafaxine daily - resume; d/w pharmacy to start at 75 for a few days then increase back to home dose  - pta 3 mg melatonin HS, 50 mg trazodone HS prn - dc for prolonged QTc     It looks like patient has chronically prolonged QTc on review of records;     Atrial fibrillation: Patient was on 20mg Xarelto PTA. I see one note re: afib in her outpatient records; she did have several episodes of afib with RVR here in the hospital. She thinks she is in on Xarelto for TIA with factor V leidin, which is not an indication. Given h/o PAF will continue Xarelto at reduced dose of 15mg. She is currently in sinus. I will order a event monitor for her on discharge to see  "if when not septic she truly has afib. Given her falls risk, would recommend dc Xarelto if no PAF or clear indication.      Resolved issues this admission:    Hypotension 2/2 sepsis    Acute encephalopathy 2/2 sepsis    Acute metabolic acidosis with tachypnea for compensation and no pulmonary reserve     Demand ischemia     Code Status: discussed on 3/27 with Stephanie; she wants to remain full code; d/w Leelee that I'm concerned and wish that she would accept more discussions re: advance directive/goals of care. Recommended Leelee approach Stephanie's PCP on this matter or refer for palliative care consult if this is available to her as outpatient. \"  She is excited to discontinue home, feeling stronger and ready to go.    SKILLED NURSING FACILITY COURSE:  During this TCU stay, patient completed all anticipated goals of therapy.      PHYSICAL EXAMINATION:    Vitals:    04/26/21 1713   BP: 114/69   Pulse: 64   Temp: 97.8  F (36.6  C)   Weight: 130 lb 12.8 oz (59.3 kg)         GENERAL: Awake, Alert, oriented x3, not in any form of acute distress, answers questions appropriately, follows simple commands, conversant  HEENT: Head is normocephalic with normal hair distribution. No evidence of trauma. Ears: No acute purulent discharge. Eyes: Conjunctivae pink with no scleral jaundice. Nose: Normal mucosa and septum. NECK: Supple with no cervical or supraclavicular lymphadenopathy. Trachea is midline.   CHEST: No tenderness or deformity, no crepitus  LUNG: Clear to auscultation with good chest expansion. 02 dependant and BS Clear but DM  EXTREMITIES: Atraumatic. Full range of motion on both upper and lower extremities, there is no tenderness to palpation, no pedal edema, no cyanosis or clubbing, no calf tenderness, normal cap refill, no joint swelling.  SKIN: Warm and dry, no erythema noted, no rashes or lesions.  NEUROLOGICAL: The patient is oriented to person, place and time. Strength and sensation are grossly intact. Face is " symmetric.      LABS:  All labs reviewed in the nursing home record.        DISCHARGE PLAN:.  The Patient is homebound due to: Deconditioned state sp hospitalization for respiratory distress and in weakened state and it is taxing and it will take a considerable amount of effort for patient to leave the home.  She is dependent on others for transportation.  The patient is confined to her home and needs intermittent skilled nursing, PT,OT, RN, SW, and HHA.    Patient to be followed by home care for physical therapy to eval and treat for strengthening, balance, endurance, and safety with mobility, and ambulation.  Patient to be followed by home care for occupational therapy to eval and treat for strengthening, ADL needs, adaptive equipment, and safety.  Patient to be followed by home care for nursing services for medication set up and teaching, symptom and disease processes monitoring and education.    Patient to be followed by home care for home health aid services for bathing and ADL needs.  Planned discharge.  All therapy goals have been met.  Family will assist with discharge and transportation.  Patient will follow up with PCP within 7 days after discharge for medication mangagment and appropriate lab studies.      Equipment   RX written for nebulizer      Electronically signed by:  Monet Byrd CNP  This progress note was completed using Dragon software and there may be grammatical errors.      For documentation purposes, chart review, medication management, and discharge coordination of care was greater than 35 minutes

## 2021-06-30 NOTE — PROGRESS NOTES
Progress Notes by Samantha Herrera CNP at 1/4/2021  9:50 AM     Author: Samantha Herrera CNP Service: -- Author Type: Nurse Practitioner    Filed: 1/4/2021 10:41 AM Encounter Date: 1/4/2021 Status: Signed    : Samantha Herrera CNP (Nurse Practitioner)             Assessment/Recommendations   Assessment:    1. Nonischemic cardiomyopathy with systolic dysfunction, NYHA class III: Decompensated.  She states her weight is increased a few pounds.  She has moderate pedal edema.  She has chronic dyspnea on exertion and fatigue.  She denies orthopnea or PND.    2.  Hypertension: Controlled.  Blood pressure 128/62    3.  Chronic kidney disease stage III: Creatinine was slightly elevated when she had labs last.  BMP pending    Plan:  1.  BMP and BNP pending.  Based on lab results will adjust Lasix  2.  Continue daily weights and low-sodium diet    Irene León will follow up with Dr. Chilel in June and in the heart failure clinic in 6-8 weeks.     History of Present Illness/Subjective    Ms. Irene León is a 75 y.o. female seen at [facility] heart failure clinic today for continued follow-up.  She follows up for heart failure with reduced ejection fraction.  She was hospitalized in October with a compression fracture of her T12 and acute heart failure.  She was diuresed and symptoms improved.  She had an echocardiogram showing ejection fraction of 40%.  Dr. Booker recommended possible CRT due to her left bundle branch block and reduced ejection fraction. With her compression fracture will hold off on this. She is not able to move much with her pain. She has a past medical history significant for hypertension, dyslipidemia, COPD, and chronic kidney disease stage III.    She has chronic dyspnea on exertion. She denies orthopnea, PND or chest pain. She has moderate pedal edema.  She denies lightheadedness, shortness of breath, orthopnea, PND, palpitations, chest pain and abdominal  fullness/bloating.      She is monitoring home weights which was 141 pounds today.  She is trying to follow a low sodium diet.       Physical Examination Review of Systems   Vitals:    01/04/21 1006   BP: 128/62   Pulse: 64   Resp: 16     Body mass index is 25.81 kg/m .  Wt Readings from Last 3 Encounters:   01/04/21 141 lb 1.6 oz (64 kg)   12/29/20 138 lb (62.6 kg)   12/15/20 144 lb 4.8 oz (65.5 kg)       General Appearance:   no acute distress   ENT/Mouth: membranes moist, no oral lesions or bleeding gums.      EYES:  no scleral icterus, normal conjunctivae   Neck: no carotid bruits or thyromegaly   Chest/Lungs:   lungs are clear to auscultation, no rales or wheezing, equal chest wall expansion    Cardiovascular:   Regular. Normal first and second heart sounds with no murmurs, rubs, or gallops; Jugular venous pressure normal, 1-2+ pedal edema bilaterally    Abdomen:  no organomegaly, masses, bruits, or tenderness; bowel sounds are present   Extremities: no cyanosis or clubbing   Skin: no xanthelasma, warm.    Neurologic: normal  bilateral, no tremors     Psychiatric: alert and oriented x3    General: Weight Gain, Weight Loss  Eyes: WNL  Ears/Nose/Throat: WNL  Lungs: Shortness of Breath, Cough, Wheezing  Heart: Shortness of Breath with activity, Leg Swelling  Stomach: Heartburn  Bladder: Frequent Urination at Night  Muscle/Joints: WNL  Skin: WNL  Nervous System: WNL  Mental Health: WNL     Blood: Easy Bleeding, Easy Bruising     Medical History  Surgical History Family History Social History   Past Medical History:   Diagnosis Date   ? Arrhythmia    ? Breast cancer (H) 2017   ? CHF (congestive heart failure) (H)    ? COPD (chronic obstructive pulmonary disease) (H)    ? Coronary artery disease    ? GERD (gastroesophageal reflux disease)    ? Hx of radiation therapy 2017   ? Hyperlipidemia    ? Hypertension    ? Idiopathic cardiomyopathy (H)     Past Surgical History:   Procedure Laterality Date   ? BREAST  BIOPSY Right 2017   ? BREAST LUMPECTOMY Left 06/2017    x2   ? CARDIAC CATHETERIZATION     ? CATARACT EXTRACTION Left 2017   ? IA OPEN TX FEMORAL FRACTURE DISTAL MED/LAT CONDYLE Left 10/28/2015    Procedure: OPEN REDUCTION INTERNAL FIXATION LEFT  PROXIMAL FEMUR PERIPROSTHETIC FRACTURE;  Surgeon: Yovanny Albarran MD;  Location: [facility] Main OR;  Service: Orthopedics   ? REVISION TOTAL HIP ARTHROPLASTY Left     Family History   Problem Relation Age of Onset   ? Osteoporosis Other    ? Prostate cancer Brother    ? Breast cancer Maternal Aunt         age thought to be in her 70's-80's   ? Prostate cancer Maternal Uncle     Social History     Socioeconomic History   ? Marital status:      Spouse name: Not on file   ? Number of children: Not on file   ? Years of education: Not on file   ? Highest education level: Not on file   Occupational History   ? Not on file   Social Needs   ? Financial resource strain: Not on file   ? Food insecurity     Worry: Not on file     Inability: Not on file   ? Transportation needs     Medical: Not on file     Non-medical: Not on file   Tobacco Use   ? Smoking status: Former Smoker     Quit date: 10/28/2007     Years since quittin.1   ? Smokeless tobacco: Former User     Quit date: 2004   Substance and Sexual Activity   ? Alcohol use: No   ? Drug use: No   ? Sexual activity: Not on file   Lifestyle   ? Physical activity     Days per week: Not on file     Minutes per session: Not on file   ? Stress: Not on file   Relationships   ? Social connections     Talks on phone: Not on file     Gets together: Not on file     Attends Voodoo service: Not on file     Active member of club or organization: Not on file     Attends meetings of clubs or organizations: Not on file     Relationship status: Not on file   ? Intimate partner violence     Fear of current or ex partner: Not on file     Emotionally abused: Not on file     Physically abused: Not on file     Forced sexual  activity: Not on file   Other Topics Concern   ? Not on file   Social History Narrative     and lives in home with 3 flight of steps          Medications  Allergies   Current Outpatient Medications   Medication Sig Dispense Refill   ? acetaminophen (TYLENOL) 325 MG tablet Take 2 tablets (650 mg total) by mouth 3 (three) times a day. (Patient taking differently: Take 650 mg by mouth 4 (four) times a day. )  0   ? ADVAIR DISKUS 500-50 mcg/dose DISKUS USE 1 INHALATION TWICE A DAY 60 each 11   ? albuterol (PROAIR HFA;PROVENTIL HFA;VENTOLIN HFA) 90 mcg/actuation inhaler Inhale 2 puffs every 4 (four) hours as needed for wheezing. 1 Inhaler 0   ? ascorbic acid, vitamin C, (VITAMIN C) 1000 MG tablet Take 1,000 mg by mouth daily. Airborne 1 tab daily     ? aspirin 81 MG EC tablet Take 81 mg by mouth daily.     ? azelastine (ASTELIN) 137 mcg (0.1 %) nasal spray 2 sprays into each nostril daily.     ? benzonatate (TESSALON) 200 MG capsule Take 1 capsule (200 mg total) by mouth 3 (three) times a day as needed for cough. 90 capsule 2   ? cholecalciferol, vitamin D3, 1,000 unit tablet Take 1,000 Units by mouth daily.     ? diclofenac sodium (VOLTAREN) 1 % Gel Apply 4 g to area of pain in low back 4 times daily  0   ? EPINEPHrine (EPIPEN/ADRENACLICK/AUVI-Q) 0.3 mg/0.3 mL injection Inject 0.3 mg into the shoulder, thigh, or buttocks.     ? famotidine (PEPCID) 20 MG tablet Take 20 mg by mouth daily.     ? fluticasone (FLONASE) 50 mcg/actuation nasal spray Apply 1 spray into each nostril as needed.      ? furosemide (LASIX) 80 MG tablet Take 40 mg by mouth 2 (two) times a day at 9am and 6pm.      ? gabapentin (NEURONTIN) 300 MG capsule Take 2 capsules (600 mg total) by mouth 2 (two) times a day. 360 capsule 2   ? ipratropium (ATROVENT HFA) 17 mcg/actuation inhaler Inhale 2 puffs every 6 (six) hours. 3 Inhaler 3   ? ipratropium-albuterol (DUO-NEB) 0.5-2.5 mg/3 mL nebulizer 1 neb 4 times a day until seen by primary and then as  needed or as directed 60 vial 1   ? lidocaine (XYLOCAINE) 5 % ointment Apply to area of pain in low back 4 times daily 35.44 g 0   ? losartan (COZAAR) 50 MG tablet TAKE 1 TABLET DAILY 90 tablet 3   ? melatonin 3 mg Tab tablet Take 1 tablet (3 mg total) by mouth at bedtime.  0   ? metoprolol succinate (TOPROL-XL) 50 MG 24 hr tablet Take 1.5 tablets (75 mg total) by mouth daily. 135 tablet 3   ? mirabegron (MYRBETRIQ) 50 mg Tb24 ER tablet Take 1 tablet (50 mg total) by mouth daily. 90 tablet 3   ? pantoprazole (PROTONIX) 40 MG tablet Take 1 tablet (40 mg total) by mouth daily. 90 tablet 4   ? polyethylene glycol (MIRALAX) 17 gram packet Take 1 packet (17 g total) by mouth daily as needed.  0   ? rivaroxaban ANTICOAGULANT (XARELTO) 20 mg tablet Take 20 mg by mouth at bedtime.     ? simvastatin (ZOCOR) 40 MG tablet Take 1 tablet (40 mg total) by mouth at bedtime. 90 tablet 3   ? sodium chloride (OCEAN) 0.65 % nasal spray 2 sprays into each nostril as needed.     ? solifenacin (VESICARE) 5 MG tablet Take 1 tablet (5 mg total) by mouth at bedtime. 90 tablet 3   ? tamoxifen (NOLVADEX) 20 MG tablet TAKE 1 TABLET DAILY 90 tablet 4   ? traZODone (DESYREL) 50 MG tablet TAKE 1 TABLET(50 MG) BY MOUTH AT BEDTIME AS NEEDED 90 tablet 2   ? venlafaxine (EFFEXOR-XR) 150 MG 24 hr capsule TAKE 1 CAPSULE DAILY 90 capsule 4   ? chlorhexidine (PERIDEX) 0.12 % solution Apply 15 mL to the mouth or throat at bedtime as needed.      ? omalizumab (XOLAIR) 150 mg/mL Syrg injection Inject 300 mg under the skin every 14 (fourteen) days.      ? oxyCODONE-acetaminophen (PERCOCET/ENDOCET) 5-325 mg per tablet TK 1 T PO Q 12 PRN     ? senna-docusate (PERICOLACE) 8.6-50 mg tablet Take 1 tablet by mouth daily as needed for constipation.  0     No current facility-administered medications for this visit.     Allergies   Allergen Reactions   ? Sulfa (Sulfonamide Antibiotics) Rash     Headaches and dizziness.   ? Homeopathic Drugs Unknown and Other (See  Comments)     Comment: runny nose, Comment: runny nose   ? Mold Extracts    ? Morphine (Pf)      hallucinate   ? Lisinopril Cough, Unknown and Itching     Comment: cough, Comment: cough   ? Sulfacetamide Sodium Rash         Lab Results    Chemistry/lipid CBC Cardiac Enzymes/BNP/TSH/INR   Lab Results   Component Value Date    CREATININE 1.49 (H) 12/15/2020    BUN 17 12/15/2020    K 4.9 12/15/2020     12/15/2020     12/15/2020    CO2 27 12/15/2020    Lab Results   Component Value Date    WBC 10.8 10/26/2020    HGB 14.5 10/26/2020    HCT 45.7 10/26/2020    MCV 93 10/26/2020     10/26/2020    Lab Results   Component Value Date    CKMB <1 08/05/2011    TROPONINI 0.06 10/20/2020     (H) 10/20/2020    TSH 0.57 11/01/2015    INR 3.33 (H) 11/13/2017             This note has been dictated using voice recognition software. Any grammatical, typographical, or context distortions are unintentional and inherent to the software

## 2021-07-01 VITALS
HEART RATE: 64 BPM | HEIGHT: 60 IN | WEIGHT: 138.7 LBS | TEMPERATURE: 97.3 F | SYSTOLIC BLOOD PRESSURE: 134 MMHG | DIASTOLIC BLOOD PRESSURE: 84 MMHG | BODY MASS INDEX: 27.23 KG/M2

## 2021-07-04 NOTE — LETTER
Letter by Pankaj Montanez MD at      Author: Pankaj Montanez MD Service: -- Author Type: --    Filed:  Encounter Date: 6/22/2021 Status: (Other)         Irene León  [address] [facility] [id number]             June 22, 2021         Dear Ms. León,    Below are the results from your recent visit:    Resulted Orders   Basic Metabolic Panel   Result Value Ref Range    Sodium 139 136 - 145 mmol/L    Potassium 4.0 3.5 - 5.0 mmol/L    Chloride 103 98 - 107 mmol/L    CO2 22 22 - 31 mmol/L    Anion Gap, Calculation 14 5 - 18 mmol/L    Glucose 90 70 - 125 mg/dL    Calcium 8.9 8.5 - 10.5 mg/dL    BUN 45 (H) 8 - 28 mg/dL    Creatinine 2.10 (H) 0.60 - 1.10 mg/dL    GFR MDRD Af Amer 28 (L) >60 mL/min/1.73m2    GFR MDRD Non Af Amer 23 (L) >60 mL/min/1.73m2    Narrative    Fasting Glucose reference range is 70-99 mg/dL per  American Diabetes Association (ADA) guidelines.       Mildly higher BUN and creatinine than last visit.  You may have been somewhat dehydrated on day of visit.  I will plan to have you recheck your kidney labs in July at your next appointment.  But no change in medication treatment plan at this time.    Potassium level is normal.    Please call with questions or contact us using [employer].    Sincerely,        Electronically signed by Pankaj Montanez MD

## 2021-07-04 NOTE — ADDENDUM NOTE
Addendum Note by Michelle Lopez at 5/19/2021  9:30 AM     Author: Michelle Lopez Service: -- Author Type:     Filed: 5/19/2021  5:26 PM Encounter Date: 5/19/2021 Status: Signed    : Michelle Lopez ()    Addended by: MICHELLE LOPEZ on: 5/19/2021 05:26 PM        Modules accepted: Orders

## 2021-07-04 NOTE — PROGRESS NOTES
Progress Notes by Gaby Chilel MD at 5/25/2021 11:30 AM     Author: Gaby Chilel MD Service: -- Author Type: Physician    Filed: 5/25/2021  4:48 PM Encounter Date: 5/25/2021 Status: Signed    : Gaby Chilel MD (Physician)           Thank you, Dr. Montanez, for asking the [facility] team to see Ms. Irene León to follow-up on nonischemic cardiomyopathy and chronic systolic heart failure.    Assessment/Recommendations   Assessment:    1.  Ischemic cardiomyopathy, EF 40% by last echocardiogram in October 2020.  Patient reports no current heart failure symptoms.  Recommended follow-up echocardiogram to reevaluate LV function.  Was recently taken off losartan due to worsening renal function and mild hypotension.  I did suggest that we repeat her basic metabolic profile today to see if there is any possibility we can restart her losartan at a lower dose  2.  Chronic systolic heart failure, currently well compensated.  No indication to change her diuretic at this point.  3.  Centrilobular emphysema, requiring 2 L supplemental oxygen chronically.  She is due for follow-up with her primary pulmonologist as oxygen therapy was started following her last hospitalization.  4.  Chronic kidney disease, stage IV by recent testing.  Again her losartan was discontinued.  We will repeat a basic metabolic profile today to reevaluate.    Plan:  1.  Continue current medications for now  2.  Check basic metabolic profile  3.  Schedule echocardiogram to follow-up on LV systolic function       History of Present Illness    Ms. Irene León is a 76 y.o. female with history of nonischemic cardiomyopathy, EF 40%, severe emphysema now on supplemental oxygen, compression fracture who is here in the office today for a follow-up visit.  She was hospitalized for acute on chronic COPD exacerbation in early March along with mild systolic congestive heart failure.  She was treated with diuretic therapy and COPD  medication but has required supplemental oxygen since her discharge from the hospital.  She has been trying to make an appointment with her pulmonologist at [facility] but has been unable to get in before today's visit.  She tells me she feels terribly overall but cannot be more specific.  She denies any chest discomfort, orthopnea, PND or lower extremity edema.  No obvious weight gain.  No palpitations or sense of heart racing.    ECG (personally reviewed): No ECG today    Cardiac Imaging Studies (personally reviewed): No recent cardiac imaging     Physical Examination Review of Systems   Vitals:    05/25/21 1156   BP: 130/70   Pulse: 91   Resp: 18   SpO2: 91%     Body mass index is 24.93 kg/m .  Wt Readings from Last 3 Encounters:   05/25/21 125 lb (56.7 kg)   05/19/21 127 lb (57.6 kg)   05/04/21 127 lb 8 oz (57.8 kg)     General Appearance:   Awake, Alert, No acute distress.   HEENT:  No scleral icterus; the mucous membranes were pink and moist.   Neck: No cervical bruits or jugular venous distention    Chest: The spine was straight. The chest was symmetric.   Lungs:   Respirations unlabored; the lungs are clear to auscultation. No wheezing   Cardiovascular:    Regular rate and rhythm.  S1, S2 normal.  No murmur or gallop   Abdomen:  No organomegaly, masses, bruits, or tenderness. Bowels sounds are present   Extremities:  No peripheral edema bilaterally.   Skin: No xanthelasma. Warm, Dry.   Musculoskeletal: No tenderness.   Neurologic: Mood and affect are appropriate.    General: Weight Loss  Eyes: WNL  Ears/Nose/Throat: WNL  Lungs: Cough, Shortness of Breath, Wheezing  Heart: Shortness of Breath with activity, Leg Swelling  Stomach: Nausea  Bladder: Frequent Urination at Night  Muscle/Joints: WNL  Skin: Rash  Nervous System: Dizziness  Mental Health: Depression     Blood: WNL     Medical History  Surgical History Family History Social History   Past Medical History:   Diagnosis Date   ? ANATOLIY (acute  kidney injury) (H)    ? Allergic rhinitis    ? Arrhythmia    ? Atrial fibrillation with RVR (H)    ? Bacteremia    ? Breast cancer (H) 2017   ? Cardiomyopathy (H)    ? Centrilobular emphysema (H)    ? CHF (congestive heart failure) (H)    ? CKD (chronic kidney disease)    ? COPD (chronic obstructive pulmonary disease) (H)    ? Coronary artery disease    ? Depression    ? Dysphagia    ? E. coli sepsis (H)    ? Factor 5 Leiden mutation, heterozygous (H)    ? GERD (gastroesophageal reflux disease)    ? Hx of radiation therapy 2017   ? Hyperlipidemia    ? Hypertension    ? Hypokalemia    ? Hypomagnesemia    ? Idiopathic cardiomyopathy (H)    ? OAB (overactive bladder)    ? Osteoporosis    ? Sacral insufficiency fracture    ? Sinusitis    ? Syncope    ? Urge incontinence    ? Vocal cord dysfunction     Past Surgical History:   Procedure Laterality Date   ? BLADDER SURGERY      bladder interstim with removal   ? BREAST BIOPSY Right 2017   ? BREAST LUMPECTOMY Left 06/2017    x2   ? CARDIAC CATHETERIZATION     ? CATARACT EXTRACTION Left 07/18/2017   ? [place] OPEN TX FEMORAL FRACTURE DISTAL MED/LAT CONDYLE Left 10/28/2015    Procedure: OPEN REDUCTION INTERNAL FIXATION LEFT  PROXIMAL FEMUR PERIPROSTHETIC FRACTURE;  Surgeon: Yovanny Albarran MD;  Location: [facility] OR;  Service: Orthopedics   ? REVISION TOTAL HIP ARTHROPLASTY Left     Family History   Problem Relation Age of Onset   ? Osteoporosis Other    ? Prostate cancer Brother    ? Breast cancer Maternal Aunt         age thought to be in her 70's-80's   ? Prostate cancer Maternal Uncle     Social History     Socioeconomic History   ? Marital status:      Spouse name: Not on file   ? Number of children: Not on file   ? Years of education: Not on file   ? Highest education level: Not on file   Occupational History   ? Not on file   Social Needs   ? Financial resource strain: Not on file   ? Food insecurity     Worry: Not on file     Inability: Not on file   ?  Transportation needs     Medical: Not on file     Non-medical: Not on file   Tobacco Use   ? Smoking status: Former Smoker     Quit date: 10/28/2007     Years since quittin.5   ? Smokeless tobacco: Former User     Quit date: 2004   Substance and Sexual Activity   ? Alcohol use: No   ? Drug use: No   ? Sexual activity: Not on file   Lifestyle   ? Physical activity     Days per week: Not on file     Minutes per session: Not on file   ? Stress: Not on file   Relationships   ? Social connections     Talks on phone: Not on file     Gets together: Not on file     Attends Religion service: Not on file     Active member of club or organization: Not on file     Attends meetings of clubs or organizations: Not on file     Relationship status: Not on file   ? Intimate partner violence     Fear of current or ex partner: Not on file     Emotionally abused: Not on file     Physically abused: Not on file     Forced sexual activity: Not on file   Other Topics Concern   ? Not on file   Social History Narrative     and lives in home with 3 flight of steps          Medications  Allergies   Current Outpatient Medications   Medication Sig Dispense Refill   ? acetaminophen (TYLENOL) 500 MG tablet Take 1,000 mg by mouth 3 (three) times a day.     ? albuterol (ACCUNEB) 0.63 mg/3 mL nebulizer solution Take 1 ampule by nebulization 4 (four) times a day.     ? albuterol (PROAIR HFA;PROVENTIL HFA;VENTOLIN HFA) 90 mcg/actuation inhaler Inhale 2 puffs every 4 (four) hours as needed for wheezing. 1 Inhaler 0   ? ascorbic acid, vitamin C, (VITAMIN C) 1000 MG tablet Take 1,000 mg by mouth daily. Airborne 1 tab daily     ? ATROVENT HFA 17 mcg/actuation inhaler USE 2 INHALATIONS EVERY 6 HOURS 1 Inhaler 3   ? azelastine (ASTELIN) 137 mcg (0.1 %) nasal spray 2 sprays into each nostril daily.     ? azithromycin (ZITHROMAX) 250 MG tablet Take 1 tablet (250 mg total) by mouth at bedtime. 1 tablet 0   ? benzonatate (TESSALON) 200 MG capsule  Take 1 capsule (200 mg total) by mouth 3 (three) times a day as needed for cough. 90 capsule 0   ? cephalexin (KEFLEX) 500 MG capsule      ? chlorhexidine (PERIDEX) 0.12 % solution Apply 15 mL to the mouth or throat at bedtime as needed.      ? cholecalciferol, vitamin D3, 1,000 unit tablet Take 1,000 Units by mouth daily.     ? diclofenac sodium (VOLTAREN) 1 % Gel Apply 1 g topically 3 (three) times a day.      ? doxycycline (MONODOX) 100 MG capsule      ? doxycycline (VIBRA-TABS) 100 MG tablet Take 1 tablet (100 mg total) by mouth 2 (two) times a day for 10 days. 20 tablet 0   ? EPINEPHrine (EPIPEN/ADRENACLICK/AUVI-Q) 0.3 mg/0.3 mL injection Inject 0.3 mg into the shoulder, thigh, or buttocks.     ? famotidine (PEPCID) 20 MG tablet      ? fluticasone (FLONASE) 50 mcg/actuation nasal spray Apply 1 spray into each nostril 2 (two) times a day as needed for allergies.      ? fluticasone-umeclidinium-vilanterol (TRELEGY ELLIPTA) 100-62.5-25 mcg DsDv inhaler Inhale 1 Inhalation daily. 1 each 3   ? furosemide (LASIX) 40 MG tablet Take 1 tablet (40 mg total) by mouth 2 (two) times a day. (Patient taking differently: Take 80 mg by mouth every morning. and 40mg at 2pm) 180 tablet 1   ? gabapentin (NEURONTIN) 300 MG capsule Take 2 capsules (600 mg total) by mouth 2 (two) times a day. 360 capsule 2   ? hydrOXYzine pamoate (VISTARIL) 25 MG capsule Take 25 mg by mouth 3 (three) times a day.      ? ipratropium-albuteroL (DUO-NEB) 0.5-2.5 mg/3 mL nebulizer Inhale 3 mL.     ? loperamide (IMODIUM) 2 mg capsule Take 4 mg by mouth as needed for diarrhea (Every 3 hours as needed for Diarrhea).     ? melatonin 3 mg Tab tablet Take 1 tablet (3 mg total) by mouth at bedtime.  0   ? metoprolol succinate (TOPROL-XL) 50 MG 24 hr tablet Take 50 mg by mouth at bedtime.     ? MYRBETRIQ 50 mg Tb24 ER tablet TAKE 1 TABLET DAILY 90 tablet 3   ? omalizumab (XOLAIR) 150 mg injection Inject 150 mg under the skin.     ? pantoprazole (PROTONIX) 40 MG  tablet Take 1 tablet (40 mg total) by mouth daily. 90 tablet 4   ? polyethylene glycol (MIRALAX) 17 gram packet Take 1 packet (17 g total) by mouth daily as needed.  0   ? predniSONE (DELTASONE) 20 MG tablet Take 40 mg by mouth daily. 10 tablet 0   ? rivaroxaban ANTICOAGULANT (XARELTO) 15 mg tablet Take 15 mg by mouth daily with supper.     ? simvastatin (ZOCOR) 40 MG tablet Take 1 tablet (40 mg total) by mouth at bedtime. 90 tablet 3   ? sodium chloride (OCEAN) 0.65 % nasal spray 2 sprays into each nostril every 2 (two) hours as needed for congestion.      ? solifenacin (VESICARE) 5 MG tablet Take 1 tablet (5 mg total) by mouth at bedtime. 90 tablet 3   ? tamoxifen (NOLVADEX) 20 MG tablet TAKE 1 TABLET DAILY 90 tablet 3   ? traMADoL (ULTRAM) 50 mg tablet Take 1 tablet (50 mg total) by mouth every 6 (six) hours as needed for pain or severe pain (7-10). 30 tablet 0   ? traZODone (DESYREL) 50 MG tablet      ? venlafaxine (EFFEXOR-XR) 150 MG 24 hr capsule TAKE 1 CAPSULE DAILY 90 capsule 3   ? VIOS AEROSOL DELIVERY SYSTEM Cheri Use As Directed.       No current facility-administered medications for this visit.       Allergies   Allergen Reactions   ? Sulfa (Sulfonamide Antibiotics) Rash     Headaches and dizziness.   ? Homeopathic Drugs Unknown and Other (See Comments)     Comment: runny nose, Comment: runny nose   ? Mold Extracts    ? Morphine (Pf)      hallucinate   ? Lisinopril Cough, Unknown and Itching     Comment: cough, Comment: cough   ? Sulfacetamide Sodium Rash         Lab Results    Chemistry/lipid CBC Cardiac Enzymes/BNP/TSH/INR   Lab Results   Component Value Date    CREATININE 1.61 (H) 05/25/2021    BUN 34 (H) 05/25/2021    K 2.9 (L) 05/25/2021     05/25/2021     05/25/2021    CO2 26 05/25/2021    Lab Results   Component Value Date    WBC 9.5 04/05/2021    HGB 9.8 (L) 04/05/2021    HCT 31.2 (L) 04/05/2021    MCV 89 04/05/2021     04/05/2021    Lab Results   Component Value Date    CKMB <1  08/05/2011    TROPONINI 0.03 03/10/2021     (H) 03/09/2021    TSH 0.57 11/01/2015    INR 1.16 (H) 02/20/2021        A total of 38 minutes was spent reviewing patient's medical records, obtaining history and performing examination, as well as discussing diagnoses/ recommendations with patient and answering all questions.

## 2021-07-04 NOTE — LETTER
Letter by Pankaj Montanez MD at      Author: Pankaj Montanez MD Service: -- Author Type: --    Filed:  Encounter Date: 6/3/2021 Status: (Other)         Irene León  [address] [facility] [address] [facility] [address]             Letty 3, 2021         Dear Ms. León,    Below are the results from your recent visit:    Resulted Orders   XR Chest 2 Views    Narrative    EXAM: XR CHEST 2 VIEWS  LOCATION: [facility]  DATE/TIME: 6/2/2021 4:19 PM    INDICATION: Cough  COMPARISON: 05/19/2021      Impression    Hyperinflation with some mild scattered fibrosis. No acute new findings. Scoliosis. Multiple bilateral rib fractures.       Chest x-ray did not show a pneumonia.  No change in treatment plan, as discussed at clinic visit.    Please call with questions or contact us using [employer].    Sincerely,        Electronically signed by Pankaj Montanez MD

## 2021-07-04 NOTE — LETTER
Letter by Pankaj Montanez MD at      Author: Pankaj Montanez MD Service: -- Author Type: --    Filed:  Encounter Date: 6/3/2021 Status: (Other)         Irene León  [address] [facility] [address]             Letty 3, 2021         Dear Ms. León,    Below are the results from your recent visit:    Resulted Orders   Comprehensive Metabolic Panel   Result Value Ref Range    Sodium 140 136 - 145 mmol/L    Potassium 3.9 3.5 - 5.0 mmol/L    Chloride 101 98 - 107 mmol/L    CO2 25 22 - 31 mmol/L    Anion Gap, Calculation 14 5 - 18 mmol/L    Glucose 78 70 - 125 mg/dL    BUN 31 (H) 8 - 28 mg/dL    Creatinine 1.71 (H) 0.60 - 1.10 mg/dL    GFR MDRD Af Amer 35 (L) >60 mL/min/1.73m2    GFR MDRD Non Af Amer 29 (L) >60 mL/min/1.73m2    Bilirubin, Total 0.4 0.0 - 1.0 mg/dL    Calcium 9.2 8.5 - 10.5 mg/dL    Protein, Total 7.1 6.0 - 8.0 g/dL    Albumin 3.3 (L) 3.5 - 5.0 g/dL    Alkaline Phosphatase 73 45 - 120 U/L    AST 23 0 - 40 U/L    ALT <9 0 - 45 U/L    Narrative    Fasting Glucose reference range is 70-99 mg/dL per  American Diabetes Association (ADA) guidelines.   HM2(CBC w/o Differential)   Result Value Ref Range    WBC 10.0 4.0 - 11.0 thou/uL    RBC 4.72 3.80 - 5.40 mill/uL    Hemoglobin 13.3 12.0 - 16.0 g/dL    Hematocrit 41.7 35.0 - 47.0 %    MCV 88 80 - 100 fL    MCH 28.2 27.0 - 34.0 pg    MCHC 31.9 (L) 32.0 - 36.0 g/dL    RDW 14.3 11.0 - 14.5 %    Platelets 271 140 - 440 thou/uL    MPV 8.9 7.0 - 10.0 fL   Magnesium   Result Value Ref Range    Magnesium 2.4 1.8 - 2.6 mg/dL       Potassium level and magnesium level are normal.    I would still have you proceed with the potassium supplement that you were given.    Kidney labs are stable from last check in May.  Still mildly elevated creatinine.    Blood sugar is normal.  Liver tests are normal.    Hemoglobin level has returned to normal.    No change in treatment plan as discussed at recent clinic visit.    Please call with questions or contact us using  [employer]    Sincerely,        Electronically signed by Pankaj Montanez MD

## 2021-07-06 VITALS
OXYGEN SATURATION: 88 % | WEIGHT: 126.5 LBS | HEART RATE: 88 BPM | DIASTOLIC BLOOD PRESSURE: 68 MMHG | SYSTOLIC BLOOD PRESSURE: 90 MMHG | BODY MASS INDEX: 25.23 KG/M2

## 2021-07-06 VITALS
RESPIRATION RATE: 20 BRPM | BODY MASS INDEX: 24.93 KG/M2 | HEART RATE: 99 BPM | OXYGEN SATURATION: 89 % | DIASTOLIC BLOOD PRESSURE: 70 MMHG | SYSTOLIC BLOOD PRESSURE: 130 MMHG | WEIGHT: 125 LBS

## 2021-07-06 VITALS — SYSTOLIC BLOOD PRESSURE: 137 MMHG | OXYGEN SATURATION: 93 % | HEART RATE: 66 BPM | DIASTOLIC BLOOD PRESSURE: 64 MMHG

## 2021-07-06 ASSESSMENT — PATIENT HEALTH QUESTIONNAIRE - PHQ9: SUM OF ALL RESPONSES TO PHQ QUESTIONS 1-9: 0

## 2021-07-08 ASSESSMENT — ASTHMA QUESTIONNAIRES: ACT_TOTALSCORE: 13

## 2021-07-13 ENCOUNTER — RECORDS - HEALTHEAST (OUTPATIENT)
Dept: ADMINISTRATIVE | Facility: CLINIC | Age: 76
End: 2021-07-13

## 2021-07-14 PROBLEM — I50.9 HEART FAILURE EXACERBATED BY SOTALOL (H): Status: RESOLVED | Noted: 2020-10-20 | Resolved: 2021-07-05

## 2021-07-21 ENCOUNTER — OFFICE VISIT (OUTPATIENT)
Dept: FAMILY MEDICINE | Facility: CLINIC | Age: 76
End: 2021-07-21
Payer: MEDICARE

## 2021-07-21 VITALS — SYSTOLIC BLOOD PRESSURE: 128 MMHG | HEART RATE: 61 BPM | DIASTOLIC BLOOD PRESSURE: 84 MMHG | OXYGEN SATURATION: 94 %

## 2021-07-21 DIAGNOSIS — G89.29 CHRONIC LOW BACK PAIN, UNSPECIFIED BACK PAIN LATERALITY, UNSPECIFIED WHETHER SCIATICA PRESENT: Primary | ICD-10-CM

## 2021-07-21 DIAGNOSIS — M54.50 CHRONIC LOW BACK PAIN, UNSPECIFIED BACK PAIN LATERALITY, UNSPECIFIED WHETHER SCIATICA PRESENT: Primary | ICD-10-CM

## 2021-07-21 PROCEDURE — 99213 OFFICE O/P EST LOW 20 MIN: CPT | Performed by: NURSE PRACTITIONER

## 2021-07-21 RX ORDER — CETIRIZINE HYDROCHLORIDE 10 MG/1
10 TABLET ORAL
COMMUNITY
Start: 2019-10-14 | End: 2021-09-07

## 2021-07-21 RX ORDER — CALCITONIN SALMON 200 [IU]/.09ML
SPRAY, METERED NASAL
COMMUNITY
Start: 2020-10-24 | End: 2021-08-05

## 2021-07-21 RX ORDER — BUMETANIDE 1 MG/1
TABLET ORAL
COMMUNITY
Start: 2020-11-04 | End: 2021-07-29

## 2021-07-21 RX ORDER — GABAPENTIN 600 MG/1
TABLET ORAL
COMMUNITY
Start: 2020-11-27 | End: 2021-07-29

## 2021-07-21 NOTE — PATIENT INSTRUCTIONS
You need to go back and see Dr. Aparicio at [facility].  You last saw him in January 2021.  At that time, he recommended that you have a vertebroplasty for your compression fracture.  You never ended up having the vertebroplasty.  His telephone number is [phone].    I cannot give you tramadol, per my collaborative agreement with Dr. Darling.    You do have a follow-up appoint with Dr. Darling next week.    Continue with physical therapy as best you can.    Topical Voltaren gel and Tylenol are okay to use.    Heat and gentle stretching.

## 2021-07-28 ENCOUNTER — HOSPITAL ENCOUNTER (OUTPATIENT)
Dept: CARDIOLOGY | Facility: CLINIC | Age: 76
Discharge: HOME | End: 2021-07-28
Attending: INTERNAL MEDICINE | Admitting: INTERNAL MEDICINE
Payer: MEDICARE

## 2021-07-28 DIAGNOSIS — I27.29 PULMONARY HYPERTENSIVE VENOUS DISEASE (H): ICD-10-CM

## 2021-07-28 DIAGNOSIS — I50.22 CHRONIC SYSTOLIC CONGESTIVE HEART FAILURE (H): ICD-10-CM

## 2021-07-28 DIAGNOSIS — I42.8 NONISCHEMIC CARDIOMYOPATHY (H): ICD-10-CM

## 2021-07-28 LAB — LVEF ECHO: NORMAL

## 2021-07-28 PROCEDURE — 93306 TTE W/DOPPLER COMPLETE: CPT

## 2021-07-28 PROCEDURE — 93306 TTE W/DOPPLER COMPLETE: CPT | Mod: 26 | Performed by: INTERNAL MEDICINE

## 2021-07-29 ENCOUNTER — TELEPHONE (OUTPATIENT)
Dept: INTERNAL MEDICINE | Facility: CLINIC | Age: 76
End: 2021-07-29

## 2021-07-29 ENCOUNTER — OFFICE VISIT (OUTPATIENT)
Dept: INTERNAL MEDICINE | Facility: CLINIC | Age: 76
End: 2021-07-29
Payer: MEDICARE

## 2021-07-29 VITALS
DIASTOLIC BLOOD PRESSURE: 72 MMHG | WEIGHT: 126.7 LBS | SYSTOLIC BLOOD PRESSURE: 132 MMHG | BODY MASS INDEX: 25.27 KG/M2 | OXYGEN SATURATION: 90 % | HEART RATE: 60 BPM

## 2021-07-29 DIAGNOSIS — G89.4 CHRONIC PAIN SYNDROME: ICD-10-CM

## 2021-07-29 DIAGNOSIS — I50.23 ACUTE ON CHRONIC SYSTOLIC CONGESTIVE HEART FAILURE (H): ICD-10-CM

## 2021-07-29 DIAGNOSIS — I27.20 PULMONARY HYPERTENSION (H): ICD-10-CM

## 2021-07-29 DIAGNOSIS — Z51.81 ENCOUNTER FOR THERAPEUTIC DRUG MONITORING: ICD-10-CM

## 2021-07-29 DIAGNOSIS — J96.11 CHRONIC RESPIRATORY FAILURE WITH HYPOXIA (H): ICD-10-CM

## 2021-07-29 DIAGNOSIS — J43.9 PULMONARY EMPHYSEMA, UNSPECIFIED EMPHYSEMA TYPE (H): Primary | ICD-10-CM

## 2021-07-29 DIAGNOSIS — Z87.81 HISTORY OF COMPRESSION FRACTURE OF SPINE: ICD-10-CM

## 2021-07-29 DIAGNOSIS — Z85.3 HISTORY OF BREAST CANCER: ICD-10-CM

## 2021-07-29 DIAGNOSIS — N18.30 CHRONIC RENAL INSUFFICIENCY, STAGE 3 (MODERATE) (H): ICD-10-CM

## 2021-07-29 DIAGNOSIS — M81.0 OSTEOPOROSIS, UNSPECIFIED OSTEOPOROSIS TYPE, UNSPECIFIED PATHOLOGICAL FRACTURE PRESENCE: Primary | ICD-10-CM

## 2021-07-29 LAB
ALBUMIN SERPL-MCNC: 3.9 G/DL (ref 3.5–5)
ALP SERPL-CCNC: 66 U/L (ref 45–120)
ALT SERPL W P-5'-P-CCNC: 12 U/L (ref 0–45)
ANION GAP SERPL CALCULATED.3IONS-SCNC: 12 MMOL/L (ref 5–18)
AST SERPL W P-5'-P-CCNC: 23 U/L (ref 0–40)
BILIRUB SERPL-MCNC: 0.6 MG/DL (ref 0–1)
BUN SERPL-MCNC: 41 MG/DL (ref 8–28)
CALCIUM SERPL-MCNC: 10.1 MG/DL (ref 8.5–10.5)
CHLORIDE BLD-SCNC: 104 MMOL/L (ref 98–107)
CO2 SERPL-SCNC: 24 MMOL/L (ref 22–31)
CREAT SERPL-MCNC: 2.2 MG/DL (ref 0.6–1.1)
GFR SERPL CREATININE-BSD FRML MDRD: 21 ML/MIN/1.73M2
GLUCOSE BLD-MCNC: 88 MG/DL (ref 70–125)
POTASSIUM BLD-SCNC: 4.4 MMOL/L (ref 3.5–5)
PROT SERPL-MCNC: 8 G/DL (ref 6–8)
SODIUM SERPL-SCNC: 140 MMOL/L (ref 136–145)

## 2021-07-29 PROCEDURE — 80053 COMPREHEN METABOLIC PANEL: CPT | Performed by: INTERNAL MEDICINE

## 2021-07-29 PROCEDURE — 99214 OFFICE O/P EST MOD 30 MIN: CPT | Performed by: INTERNAL MEDICINE

## 2021-07-29 PROCEDURE — 36415 COLL VENOUS BLD VENIPUNCTURE: CPT | Performed by: INTERNAL MEDICINE

## 2021-07-29 NOTE — PROGRESS NOTES
[facility] clinic Follow Up Note    Irene León   76 year old female    Date of Visit: 7/29/2021    Chief Complaint   Patient presents with     Follow Up     Subjective  Stephanie is a 76-year-old female with severe COPD and chronic hypoxic respiratory failure on chronic oxygen.  He had moderate pulmonary hypertension in the past.    Continues on inhalers and I did review the pulmonary medicine consult note from earlier this month, no change in treatment plan given a 6-month follow-up.    Mild chronic cough is stable.  She denies exacerbation of her COPD currently.    History of chronic systolic congestive heart failure diagnosed as of broken heart syndrome in 2014.  October 2020 heart echo with ejection fraction 40%.    I did review the heart echo with patient from yesterday.  Ejection fraction improved to 55-60%.  Normal atrial size.  No mitral regurgitation or aortic stenosis and just mild pulmonary hypertension.  Normal ascending aorta.    She has a history of long QTc.  She was taken off azithromycin.  She still on venlafaxine and trazodone.    She has been told not to use tramadol, which she is used in the past for chronic pain.  No history of ventricular tachycardia or cardiac arrest.    I did review the EKG from March 2021 with a QTC of 516.    Past history of T12 compression fracture October 2020.  Severe kyphosis.  Chronic back pain.  She is currently working with [facility] orthopedics, planning injections and physical therapy.  She is not currently doing physical therapy or back exercises.    She also has a history of chronic left hip pain from a hip replacement redo with extensive scar tissue.  She uses topical Voltaren gel.  She has mild to moderate central canal stenosis.    She does use Tylenol.  She did not set up the appointment with the osteoporosis specialist to discuss Prolia yet.    No diagnosis of coronary disease.  No new chest pain.  History of occipital stroke and heterozygous  factor V Leiden mutation with distant history of paroxysmal atrial fibrillation.    She has been on Xarelto and a low-dose aspirin.  No bleeding issues.  No new abdominal pain.    On furosemide 40 mg twice daily.  Medication list was clarified and she does not take Bumex.    Toprol-XL 50 mg a day.    Not on losartan.    She has significant renal insufficiency.  In March of this year creatinine was up to 2.9 with pyelonephritis.  On follow-up later that month creatinine down to 1.1 but has been increasing since.  In May of this year was 1.8.  Last month creatinine 2.1.  Trace ankle edema, stable.    No diarrhea, eating normally.  Normal hemoglobin last month.  On Protonix.    I did review the consult note from cardiology from earlier this month.  No change in treatment plan, plan follow-up in the CHF clinic in September.    Past history of stage Ia right breast cancer with lumpectomy and radiation 2017.  June 2021 - mammogram.  I did review the oncology note from June 29, no evidence of recurrence and given a 1 year follow-up.  She continues on tamoxifen since January 2017 with a 5-year treatment plan.    No new urinary complaints.  History of pyelonephritis in March.  She is on Myrbetriq for overactive bladder.    She has a history of chronic anxiety and dysthymia, but patient states that she does not have a history of depression or anxiety, and thinks that she was put on venlafaxine for hot flashes.    She is on venlafaxine 150 mg plus trazodone for a number of years.    PMHx:    Past Medical History:   Diagnosis Date     ANATOLIY (acute kidney injury) (H)      Allergic rhinitis      Arrhythmia      Atrial fibrillation with RVR (H)      Bacteremia      Breast cancer (H) 2017     Cardiomyopathy (H)      Centrilobular emphysema (H)      CHF (congestive heart failure) (H)      CKD (chronic kidney disease)      COPD (chronic obstructive pulmonary disease) (H)      Coronary artery disease      Depression      Dysphagia       E. coli sepsis (H)      Factor 5 Leiden mutation, heterozygous (H)      GERD (gastroesophageal reflux disease)      Hx of radiation therapy 2017     Hyperlipidemia      Hypertension      Hypokalemia      Hypomagnesemia      Idiopathic cardiomyopathy (H)      Left hip pain 4/30/2014     OAB (overactive bladder)      Osteoporosis      Sacral insufficiency fracture      Sinusitis      Syncope      Urge incontinence      Vocal cord dysfunction      PSHx:    Past Surgical History:   Procedure Laterality Date     ARTHROPLASTY REVISION HIP Left      BIOPSY BREAST Right 2017     BLADDER SURGERY      bladder interstim with removal     C OPEN TX FEMORAL FRACTURE DISTAL MED/LAT CONDYLE Left 10/28/2015    Procedure: OPEN REDUCTION INTERNAL FIXATION LEFT  PROXIMAL FEMUR PERIPROSTHETIC FRACTURE;  Surgeon: Yovanny Albarran MD;  Location: [facility] Main OR;  Service: Orthopedics     CARDIAC CATHETERIZATION       CATARACT EXTRACTION Left 07/18/2017     IR ABDOMINAL AORTOGRAM  4/16/2003     IR AORTIC ARCH 4 VESSEL ANGIOGRAM  4/16/2003     IR MISCELLANEOUS PROCEDURE  4/16/2003     LUMPECTOMY BREAST Left 06/2017    x2     Immunizations:   Immunization History   Administered Date(s) Administered     COVID-19,PF,Pfizer 03/02/2021, 03/23/2021     FLU 6-35 months 11/03/2003     Flu 65+ Years 09/20/2017, 09/25/2018, 10/10/2019     Flu, Unspecified 10/01/2016, 10/01/2018     X6c7-12 Novel Flu 01/05/2010     Influenza (H1N1) 01/05/2010     Influenza (High Dose) 3 valent vaccine 10/30/2014, 10/01/2015, 11/26/2016     Influenza (IIV3) PF 11/03/2003, 11/08/2006, 10/24/2007, 11/28/2008, 10/09/2009, 10/22/2010, 01/26/2012, 10/03/2012, 11/01/2013     Influenza, Quad, High Dose, Pf, 65yr + 10/19/2020, 12/15/2020     Pneumo Conj 13-V (2010&after) 05/11/2015     Pneumococcal 23 valent 11/24/1997, 11/21/2007, 05/17/2017     TD (ADULT, 7+) 07/20/2004     Tdap (Adacel,Boostrix) 02/12/2016     Zoster vaccine recombinant adjuvanted (SHINGRIX) 07/31/2020      Zoster vaccine, live 02/18/2009, 10/20/2014       ROS A comprehensive review of systems was performed and was otherwise negative    Medications, allergies, and problem list were reviewed and updated    Exam  /72   Pulse 60   Wt 57.5 kg (126 lb 11.2 oz)   SpO2 90%   BMI 25.27 kg/m    Severe kyphosis.  Severe reduced respiratory excursion with decreased breath sounds throughout.  But no wheezing or crackles.  No new dullness.  O2 saturation is 90% on 1-1/2 L nasal cannula.  Heart is regular although heart tones distant, I did not hear murmur rub or gallop.  There is trace ankle edema bilaterally.  Abdomen is nontender.  Patient is able to stand and climb up on exam table without obvious severe pain.    Assessment/Plan  1. Pulmonary emphysema, unspecified emphysema type (H)  Severe end-stage emphysema, oxygen dependent with pulmonary hypertension.    Continue current DuoNeb and Trelegy Ellipta.  Continue current oxygen.  Follow-up in 6 months with pulmonary clinic as planned.    Patient wishes to have narcotic treatment for her chronic pain.  I have explained to patient that narcotic treatment for her would be high risk with her chronic hypoxic respiratory failure and risk of respiratory arrest and death with use of narcotics.  Patient states she wishes to accept this risk and still proceed with narcotic treatment for her pain.    Patient does not DNR/DNI at this time.  I did explain significant risk of death with use of chronic narcotics for her.    Patient did want to talk to palliative care, and if patient wishes to accept an end of the life treatment plan for pain, she could consider high risk narcotic treatment for her pain, with the understanding that it may shorten her life.  - Palliative Care Referral; Future    2. Pulmonary hypertension (H)  Repeat echo yesterday did show improvement down to mild pulmonary hypertension.  Continue oxygen and inhalers.    3. Chronic respiratory failure with hypoxia  (H)  Continue oxygen  - Palliative Care Referral; Future    4. Chronic renal insufficiency, stage 3 (moderate)  Worsening creatinine.  Blood pressure and edema controlled.  Medication list clarified.  No longer on losartan.    Recheck kidney labs today.    5. Acute on chronic systolic congestive heart failure (H)  Significant improvement in cardiac function.    History of long QT syndrome.  I explained cardiac arrhythmia risk with use of tramadol and other narcotics.  I explained risk of torsade the point ventricular tachycardia and sudden death with use of tramadol and narcotics.  Patient stated she would accept that risk in order to use narcotics for chronic pain and would accept risk of shortening her life or sudden death.  Palliative consult to have her discuss this further.    Patient does wish to stop the venlafaxine and trazodone, as I did discuss that those medications also increase QTC and heart arrhythmia risk.  I did warn her on risk of depression and insomnia exacerbation with stopping those medications.  I did offer her a 75 mg dose of venlafaxine as an interim dose to wean down more slowly, but she declined.  Patient was told she could contact clinic for 75 mg dose of venlafaxine for 30 days to bridge her until the next visit if she wishes.    Plan EKG at next visit to reassess QTC when she is off of venlafaxine and trazodone.    She will use melatonin for insomnia.    6. History of compression fracture of spine  Chronic back pain.  She is not currently doing physical therapy or back exercises.  Emphasized that a daily back stretching and strengthening exercises is the most important way to manage her chronic back pain.  She is going to explore injections to the orthopedic clinic.  She is planning on starting some physical therapy through that orthopedic clinic.    Patient was offered referral to spine clinic or physical therapy here in the future if she wishes.    Palliative care consult to discuss  palliative narcotic treatment options with high risk medication.  - Palliative Care Referral; Future    Patient has not set up an osteoporosis consult to discuss Prolia.  I have sent a message to have patient contacted to set up an appointment with Dr. JOYCE to discuss Prolia treatment options.    7. History of breast cancer  No evidence of recurrence.  1 year follow-up with oncology next June with mammogram    8. Encounter for therapeutic drug monitoring    - Comprehensive metabolic panel    9. Chronic pain syndrome  As above  - Palliative Care Referral; Future    Overactive bladder on Myrbetriq.  No evidence of recurrent UTI.    History of stroke, does continue on low-dose aspirin in addition to the Xarelto.  Simvastatin 40 mg.      Return in 6 weeks (on 9/7/2021) for Follow up.   Patient Instructions   In order to reduce your risk of future pain medications with your long QT syndrome after venlafaxine and trazodone.  There is a risk of causing significant depression and insomnia with stopping these medications.  You do have the option of using a lower dose of venlafaxine 75 mg a day to wean off of that medication.    I requested a palliative care consult for you to discuss use of a high risk of pain medication that may cause the end of your life.    Continue to work with your orthopedic clinic on back pain treatment and physical therapy.    I will asked to have you contacted to set up an osteoporosis consult sometime later this year to discuss options for Prolia.    Pankaj Montanez MD, MD        Current Outpatient Medications   Medication Sig Dispense Refill     acetaminophen (TYLENOL) 500 MG tablet [ACETAMINOPHEN (TYLENOL) 500 MG TABLET] Take 1,000 mg by mouth 3 (three) times a day.       albuterol (PROAIR HFA;PROVENTIL HFA;VENTOLIN HFA) 90 mcg/actuation inhaler [ALBUTEROL (PROAIR HFA;PROVENTIL HFA;VENTOLIN HFA) 90 MCG/ACTUATION INHALER] Inhale 2 puffs every 4 (four) hours as needed for wheezing. 1 Inhaler 0      ascorbic acid, vitamin C, (VITAMIN C) 1000 MG tablet [ASCORBIC ACID, VITAMIN C, (VITAMIN C) 1000 MG TABLET] Take 1,000 mg by mouth daily. Airborne 1 tab daily       aspirin (ASA) 81 MG EC tablet Take 81 mg by mouth       ATROVENT HFA 17 mcg/actuation inhaler [ATROVENT HFA 17 MCG/ACTUATION INHALER] USE 2 INHALATIONS EVERY 6 HOURS 1 Inhaler 3     azelastine (ASTELIN) 137 mcg (0.1 %) nasal spray [AZELASTINE (ASTELIN) 137 MCG (0.1 %) NASAL SPRAY] 2 sprays into each nostril daily.       benzonatate (TESSALON) 200 MG capsule [BENZONATATE (TESSALON) 200 MG CAPSULE] Take 1 capsule (200 mg total) by mouth 3 (three) times a day as needed for cough. 90 capsule 0     calcitonin, salmon, (MIACALCIN) 200 UNIT/ACT nasal spray INHALE 1 SPRAY INTO ALTERNATING NOSTRIL DAILY FOR 14 DAYS       cetirizine (ZYRTEC) 10 MG tablet Take 10 mg by mouth       chlorhexidine (PERIDEX) 0.12 % solution [CHLORHEXIDINE (PERIDEX) 0.12 % SOLUTION] Apply 15 mL to the mouth or throat at bedtime as needed.        cholecalciferol, vitamin D3, 1,000 unit tablet [CHOLECALCIFEROL, VITAMIN D3, 1,000 UNIT TABLET] Take 1,000 Units by mouth daily.       diclofenac sodium (VOLTAREN) 1 % Gel [DICLOFENAC SODIUM (VOLTAREN) 1 % GEL] Apply 1 g topically 3 (three) times a day. 350 g 3     famotidine (PEPCID) 20 MG tablet [FAMOTIDINE (PEPCID) 20 MG TABLET] Take 20 mg by mouth daily before breakfast.        fluticasone (FLONASE) 50 mcg/actuation nasal spray [FLUTICASONE (FLONASE) 50 MCG/ACTUATION NASAL SPRAY] Apply 1 spray into each nostril 2 (two) times a day as needed for allergies.        fluticasone-umeclidinium-vilanterol (TRELEGY ELLIPTA) 100-62.5-25 mcg DsDv inhaler [FLUTICASONE-UMECLIDINIUM-VILANTEROL (TRELEGY ELLIPTA) 100-62.5-25 MCG DSDV INHALER] Inhale 1 Inhalation daily. 1 each 3     furosemide (LASIX) 40 MG tablet [FUROSEMIDE (LASIX) 40 MG TABLET] Take 1 tablet (40 mg total) by mouth 2 (two) times a day. 180 tablet 1     gabapentin (NEURONTIN) 300 MG capsule  [GABAPENTIN (NEURONTIN) 300 MG CAPSULE] Take 2 capsules (600 mg total) by mouth 2 (two) times a day. 360 capsule 2     ipratropium-albuteroL (DUO-NEB) 0.5-2.5 mg/3 mL nebulizer [IPRATROPIUM-ALBUTEROL (DUO-NEB) 0.5-2.5 MG/3 ML NEBULIZER] Inhale 3 mL 4 (four) times a day. 120 vial 1     loperamide (IMODIUM) 2 mg capsule [LOPERAMIDE (IMODIUM) 2 MG CAPSULE] Take 4 mg by mouth as needed for diarrhea (Every 3 hours as needed for Diarrhea).       magnesium chloride (SLOW-MAG) 64 mg TbEC delayed-release tablet [MAGNESIUM CHLORIDE (SLOW-MAG) 64 MG TBEC DELAYED-RELEASE TABLET] Take 1 tablet (64 mg total) by mouth daily. 90 tablet 3     melatonin 3 mg Tab tablet [MELATONIN 3 MG TAB TABLET] Take 1 tablet (3 mg total) by mouth at bedtime.  0     metoprolol succinate (TOPROL-XL) 50 MG 24 hr tablet Take 75 mg by mouth At Bedtime        MYRBETRIQ 50 mg Tb24 ER tablet [MYRBETRIQ 50 MG TB24 ER TABLET] TAKE 1 TABLET DAILY 90 tablet 3     polyethylene glycol (MIRALAX) 17 gram packet [POLYETHYLENE GLYCOL (MIRALAX) 17 GRAM PACKET] Take 1 packet (17 g total) by mouth daily as needed.  0     potassium chloride (KLOR-CON) 10 MEQ CR tablet [POTASSIUM CHLORIDE (KLOR-CON) 10 MEQ CR TABLET] TAKE 1 TABLET(10 MEQ) BY MOUTH TWICE DAILY 180 tablet 0     rivaroxaban ANTICOAGULANT (XARELTO) 15 mg tablet [RIVAROXABAN ANTICOAGULANT (XARELTO) 15 MG TABLET] Take 15 mg by mouth daily with supper.       simvastatin (ZOCOR) 40 MG tablet [SIMVASTATIN (ZOCOR) 40 MG TABLET] Take 1 tablet (40 mg total) by mouth at bedtime. 90 tablet 3     sodium chloride (OCEAN) 0.65 % nasal spray [SODIUM CHLORIDE (OCEAN) 0.65 % NASAL SPRAY] 2 sprays into each nostril every 2 (two) hours as needed for congestion.        solifenacin (VESICARE) 5 MG tablet [SOLIFENACIN (VESICARE) 5 MG TABLET] Take 1 tablet (5 mg total) by mouth at bedtime. 90 tablet 3     tamoxifen (NOLVADEX) 20 MG tablet [TAMOXIFEN (NOLVADEX) 20 MG TABLET] TAKE 1 TABLET DAILY 90 tablet 3     VIOS AEROSOL  DELIVERY SYSTEM Lexii [VIOS AEROSOL DELIVERY SYSTEM LEXII] Use As Directed.       EPINEPHrine (EPIPEN/ADRENACLICK/AUVI-Q) 0.3 mg/0.3 mL injection [EPINEPHRINE (EPIPEN/ADRENACLICK/AUVI-Q) 0.3 MG/0.3 ML INJECTION] Inject 0.3 mg into the shoulder, thigh, or buttocks. (Patient not taking: Reported on 2021)       predniSONE (DELTASONE) 10 mg tablet [PREDNISONE (DELTASONE) 10 MG TABLET] Take 2 tablets, 20 mg tonight.  Take 2 tablets 20 mg twice a day tomorrow.  Take 20 mg, 2 tablets once a day in the morning for 4 more days.  Take 10 mg, 1 tablet in the morning for 4 days, then 1/2 tablet, or 5 mg, for 2 days then stop.. (Patient not taking: [PREDNISONE (DELTASONE) 10 MG TABLET] Take 2 tablets, 20 mg tonight.&nbsp;&nbsp;Take 2 tablets 20 mg twice a day tomorrow.&nbsp;&nbsp;Take 20 mg, 2 tablets once a day in the morning for 4 more days.&nbsp;&nbsp;Take 10 mg, 1 tablet in the morning for 4 days, then 1/2 tablet, or 5 mg, for 2 days then stop..) 19 tablet 0     Allergies   Allergen Reactions     Sulfa (Sulfonamide Antibiotics) [Sulfa Drugs] Rash     Headaches and dizziness.     Homeopathic Drugs [External Allergen Needs Reconciliation - See Comment] Unknown and Other (See Comments)     Comment: runny nose, Comment: runny nose     Mold Extracts [Molds & Smuts] Unknown     Morphine (Pf) [Morphine] Unknown     hallucinate     Lisinopril Cough, Unknown and Itching     Comment: cough, Comment: cough     Sulfacetamide Sodium [Sulfacetamide] Rash     Social History     Tobacco Use     Smoking status: Former Smoker     Quit date: 10/28/2007     Years since quittin.7     Smokeless tobacco: Former User     Quit date: 2004   Substance Use Topics     Alcohol use: No     Drug use: No

## 2021-07-29 NOTE — TELEPHONE ENCOUNTER
Spoke with Dr. Castañeda and selected an order. This was pended. Patient was told to call the Osteoporosis Clinic if not scheduled by mid next week. Patient was given this phone number.

## 2021-07-29 NOTE — TELEPHONE ENCOUNTER
Patient needs referral to Dr. Hines for osteoporosis consult, but unable to find consult referral order on new computer system.  Please contact patient to set up a osteoporosis consult to discuss Prolia, history of compression fracture of spine

## 2021-07-29 NOTE — PATIENT INSTRUCTIONS
In order to reduce your risk of future pain medications with your long QT syndrome after venlafaxine and trazodone.  There is a risk of causing significant depression and insomnia with stopping these medications.  You do have the option of using a lower dose of venlafaxine 75 mg a day to wean off of that medication.    I requested a palliative care consult for you to discuss use of a high risk of pain medication that may cause the end of your life.    Continue to work with your orthopedic clinic on back pain treatment and physical therapy.    I will asked to have you contacted to set up an osteoporosis consult sometime later this year to discuss options for Prolia.

## 2021-07-30 DIAGNOSIS — I42.8 NONISCHEMIC CARDIOMYOPATHY (H): Primary | ICD-10-CM

## 2021-07-30 DIAGNOSIS — I50.22 CHRONIC SYSTOLIC CONGESTIVE HEART FAILURE (H): ICD-10-CM

## 2021-08-04 ENCOUNTER — TRANSFERRED RECORDS (OUTPATIENT)
Dept: HEALTH INFORMATION MANAGEMENT | Facility: CLINIC | Age: 76
End: 2021-08-04

## 2021-08-05 ENCOUNTER — VIRTUAL VISIT (OUTPATIENT)
Dept: ONCOLOGY | Facility: HOSPITAL | Age: 76
End: 2021-08-05
Attending: CLINICAL NURSE SPECIALIST
Payer: MEDICARE

## 2021-08-05 VITALS — WEIGHT: 120 LBS | BODY MASS INDEX: 24.19 KG/M2 | HEIGHT: 59 IN

## 2021-08-05 DIAGNOSIS — J96.11 CHRONIC RESPIRATORY FAILURE WITH HYPOXIA (H): ICD-10-CM

## 2021-08-05 DIAGNOSIS — Z51.5 ENCOUNTER FOR PALLIATIVE CARE: ICD-10-CM

## 2021-08-05 DIAGNOSIS — Z87.81 HISTORY OF COMPRESSION FRACTURE OF SPINE: ICD-10-CM

## 2021-08-05 DIAGNOSIS — J44.1 COPD EXACERBATION (H): ICD-10-CM

## 2021-08-05 DIAGNOSIS — J43.9 PULMONARY EMPHYSEMA, UNSPECIFIED EMPHYSEMA TYPE (H): ICD-10-CM

## 2021-08-05 DIAGNOSIS — G89.4 CHRONIC PAIN SYNDROME: ICD-10-CM

## 2021-08-05 DIAGNOSIS — R06.02 SHORTNESS OF BREATH: Primary | ICD-10-CM

## 2021-08-05 PROCEDURE — 99417 PROLNG OP E/M EACH 15 MIN: CPT | Mod: 95 | Performed by: CLINICAL NURSE SPECIALIST

## 2021-08-05 PROCEDURE — 99215 OFFICE O/P EST HI 40 MIN: CPT | Mod: 95 | Performed by: CLINICAL NURSE SPECIALIST

## 2021-08-05 RX ORDER — ALBUTEROL SULFATE 90 UG/1
2 AEROSOL, METERED RESPIRATORY (INHALATION) EVERY 4 HOURS PRN
Qty: 17 G | Refills: 1 | Status: SHIPPED | OUTPATIENT
Start: 2021-08-05 | End: 2021-08-06

## 2021-08-05 RX ORDER — ALBUTEROL SULFATE 90 UG/1
2 AEROSOL, METERED RESPIRATORY (INHALATION) EVERY 4 HOURS PRN
Qty: 17 G | Refills: 1 | Status: SHIPPED | OUTPATIENT
Start: 2021-08-05 | End: 2021-08-05

## 2021-08-05 RX ORDER — TRAMADOL HYDROCHLORIDE 50 MG/1
50 TABLET ORAL 2 TIMES DAILY PRN
Qty: 28 TABLET | Refills: 0 | Status: SHIPPED | OUTPATIENT
Start: 2021-08-05 | End: 2021-08-19

## 2021-08-05 ASSESSMENT — MIFFLIN-ST. JEOR: SCORE: 939.95

## 2021-08-05 NOTE — TELEPHONE ENCOUNTER
Please advise. The patient is taking Mybetriq and Vesicare with furosemide BID. Patient made this change by herself for her bladder control. Today during a video visit with Palliative Care, the [facility]- Maura Walker told the patient that is fine so long as Dr. Montanez were to approve it. Unbearable urinary frequency if she doesn't, per patient. Patient is already doing this but I told her that I would inform an available clinician in case they were to advise anything. Patient is in agreement with following up with an available nurse practitioner here to discuss, if needed.

## 2021-08-05 NOTE — TELEPHONE ENCOUNTER
Reason for Call:  Medication or medication refill: MEDICATION UPDATE DUE TAKING WITH [facility]    Do you use a [facility]?  Name of the pharmacy and phone number for the current request:  [facility]    Name of the medication requested:   MYRBETRIQ 50 mg Tb24 ER tablet   solifenacin (VESICARE) 5 MG tablet  potassium chloride (KLOR-CON) 10 MEQ CR tablet    Other request:  Needs new prescription for BOTH MEDICATION  for x2 daily     Can we leave a detailed message on this number? YES    Phone number patient can be reached at: Home number on file [phone] (home)    Best Time: any    Call taken on 8/5/2021 at 9:00 AM by Laura L Goldberg, ARRT

## 2021-08-05 NOTE — TELEPHONE ENCOUNTER
Inform them that Dr. Montanez is away for 2 weeks.  If she is on palliative care, I do not see much harm in continuing the current combination if this is what patient and family feels works best.

## 2021-08-05 NOTE — PROGRESS NOTES
"Stephanie is a 76 year old who is being evaluated via a billable video visit.      [facility]  Outpatient Palliative Care Consult    \"This video visit will be conducted via a call between you and your physician/provider. We have found that certain health care needs can be provided without the need for an in-person physical exam.  This service lets us provide the care you need with a video conversation.  If a prescription is necessary we can send it directly to your pharmacy.  If lab work is needed we can place an order for that and you can then stop by our lab to have the test done at a later time.     Video visits are billed at different rates depending on your insurance coverage. Please reach out to your insurance provider with any questions.     If during the course of the call the physician/provider feels a video visit is not appropriate, you will not be charged for this service.\"     Patient has given verbal consent to a Video visit? yes       Video Visit Start Time: 946  Video Visit End Time: 1033    Irene TOWNSEND Mau,  1945, MRN [id number]        PCP: Pankaj Montanez, [phone]          Extended Emergency Contact Information  Primary Emergency Contact: Leelee Conway   [facility]  Home Phone: [phone]  Relation: Other  Secondary Emergency Contact: Santos León  Address: [address] [place] [id number] [facility]  Home Phone: [phone]  Relation: Spouse          Impression and Recommendations:  SYMPTOM ASSESSMENT  1. Shortness of breath secondary to severe COPD and chronic CHF  - Oxygen as at home.  - Continue inhalers as at home - refill sent for albuterol.  - Continue close follow up with pulmonology and cardiology.    2. Chronic pain syndrome with chronic back pain from T12 compression fracture  3/9/2021  and QTc 516 on EKG.  - Patient sees orthopedics for injections. Planning for MRI of spine per Ortho.  - Continue Gabapentin 600 mg by mouth twice daily.  - Has " previously had good relief. Will resume Tramadol 50 mg by mouth twice daily as needed for pain.   Spent discussing benefit and burden and risk of sedation and respiratory depression with her kidney disease (creatinine greater than 2) and underlying COPD.  Reviewing sedating effects of Gabapentin combined with Tramadol. Patient verbalizes understanding and has tolerated this medication in the past.  - Continue Salonpas and Voltaren.  - Discussed role of pain clinic as an excellent resource for multimodal pain control and approach. Patient interested in referral to pain clinic. Order was placed today.    3. Anorexia and weight loss (30 pound weight loss since 3/2021)  - Continue to monitor.  - Patient watch ing diet and intake. Discussed trying some shake supplements.    ADVANCED CARE PLANNING AND GOALS OF CARE DISCUSSION  - Palliative Care Encounter - Please see discussion below.  - Follow up with Palliative care in 2 months.  - Goals are restorative.  - Code Status: FULL CODE.  - DTR, Leelee Evans, her decision maker.       Chief Complaint Chronic pain syndrome, dyspnea       HPI    Irene León is a 76 year old female presents to the Outpatient Palliative Care Clinic today for ongoing symptom management and goals of care discussion.     Referred by Dr. Montanez.    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      Symptom management  Pain: Mid back and left hip pain and rating it as a 0 out of 10 while sitting in the chair.  Pain exacerbates to a 9-10/10 with standing and walking.  Feels pain radiates down both legs.  Tolerated  "tramadol quite well and said it was very effective in alleviating her pain.  Expresses frustration with inability to get the low-dose of 50 mg of tramadol that she would take daily in the morning to help ease her pain.  Dyspnea: yes with minimal exertion.  Nausea: denies  Anxiety: denies     REVIEWED: yes    Patient's understanding of current medical condition:  Patient has clear understanding of her medical problems and the relationships between them.     Recent changes in patient's condition: 30 pound weight loss since 3/2021 and worsening mid back and left hip pain.    Patient's goals of care: Reviewed the role of the palliative care specialty with patient as being one to support those with to assist with serious illness managing symptoms and providing support.  Also discussed that palliative care can assist in having goals of care discussion.  Patient states that her daughter, Leelee Evans, is her primary decision maker and support system.  Patient is very clear that she would want all medical intervention instituted including resuscitation and intubation.  She states \"I know I have got the loss but I am not ready to go yet.\"  She talks fondly of her grandchildren and spending time with them.  Her ultimate goal is to achieve better pain control and have good quality of life.  She understands that she has multiple chronic medical problems and she is interested in managing them.  Discussed pain management plan and the multimodal approach that a pain clinic can offer.  She is interested in having a referral to the pain clinic and following up with those providers.  She is also interested in obtaining steroid injections to assist in pain control as well.  Goals are restorative.    Advanced Care Plans/Health Care Directive: Patient's code status is currently FULL CODE.    Surrogate Decisionmaker (if no documented/appointed agents): Leelee Evans, daughter     Spiritual History: discussion deferred    Patient " demonstrates decision-making capacity; Patient does demonstrate understanding of relevant information about condition, the available test and treatment options, use reason to make a decision about these, and communicate choice about these. (DOTTIE 306, 4, p. 420-4).       Medical History  Past Medical History:   Diagnosis Date     ANATOLIY (acute kidney injury) (H)      Allergic rhinitis      Arrhythmia      Atrial fibrillation with RVR (H)      Bacteremia      Breast cancer (H) 2017     Cardiomyopathy (H)      Centrilobular emphysema (H)      CHF (congestive heart failure) (H)      CKD (chronic kidney disease)      COPD (chronic obstructive pulmonary disease) (H)      Coronary artery disease      Depression      Dysphagia      E. coli sepsis (H)      Factor 5 Leiden mutation, heterozygous (H)      GERD (gastroesophageal reflux disease)      Hx of radiation therapy 2017     Hyperlipidemia      Hypertension      Hypokalemia      Hypomagnesemia      Idiopathic cardiomyopathy (H)      Left hip pain 4/30/2014     OAB (overactive bladder)      Osteoporosis      Sacral insufficiency fracture      Sinusitis      Syncope      Urge incontinence      Vocal cord dysfunction       Surgical History  She  has a past surgical history that includes IR Abdominal Aortogram (4/16/2003); IR Miscellaneous Procedure (4/16/2003); IR Aortic Arch 4 Vessel Angiogram (4/16/2003); Arthroplasty revision hip (Left); OPEN TX FEMORAL FRACTURE DISTAL MED/LAT CONDYLE (Left, 10/28/2015); Cardiac catheterization; Cataract Extraction (Left, 07/18/2017); Biopsy breast (Right, 2017); Bladder surgery; and Lumpectomy breast (Left, 06/2017).   Social History  Reviewed, and she  reports that she quit smoking about 13 years ago. She quit smokeless tobacco use about 16 years ago. She reports that she does not drink alcohol and does not use drugs.   Allergies  Allergies   Allergen Reactions     Sulfa (Sulfonamide Antibiotics) [Sulfa Drugs] Rash     Headaches and  dizziness.     Homeopathic Drugs [External Allergen Needs Reconciliation - See Comment] Unknown and Other (See Comments)     Comment: runny nose, Comment: runny nose     Mold Extracts [Molds & Smuts] Unknown     Morphine (Pf) [Morphine] Unknown     hallucinate     Lisinopril Cough, Unknown and Itching     Comment: cough, Comment: cough     Sulfacetamide Sodium [Sulfacetamide] Rash    Family History  Reviewed, and family history includes Breast Cancer in her maternal aunt; Osteoporosis in an other family member; Prostate Cancer in her brother and maternal uncle.   Psychosocial Needs  Social History     Social History Narrative     and lives in home with 3 flight of steps     Additional psychosocial needs reviewed per nursing assessment.     Medications & Allergies   Medications:     Current Outpatient Medications:      acetaminophen (TYLENOL) 500 MG tablet, [ACETAMINOPHEN (TYLENOL) 500 MG TABLET] Take 1,000 mg by mouth 3 (three) times a day., Disp: , Rfl:      albuterol (PROAIR HFA/PROVENTIL HFA/VENTOLIN HFA) 108 (90 Base) MCG/ACT inhaler, Inhale 2 puffs into the lungs every 4 hours as needed for shortness of breath / dyspnea, Disp: 17 g, Rfl: 1     ascorbic acid, vitamin C, (VITAMIN C) 1000 MG tablet, [ASCORBIC ACID, VITAMIN C, (VITAMIN C) 1000 MG TABLET] Take 1,000 mg by mouth daily. Airborne 1 tab daily, Disp: , Rfl:      azelastine (ASTELIN) 137 mcg (0.1 %) nasal spray, [AZELASTINE (ASTELIN) 137 MCG (0.1 %) NASAL SPRAY] 2 sprays into each nostril daily., Disp: , Rfl:      cetirizine (ZYRTEC) 10 MG tablet, Take 10 mg by mouth, Disp: , Rfl:      chlorhexidine (PERIDEX) 0.12 % solution, [CHLORHEXIDINE (PERIDEX) 0.12 % SOLUTION] Apply 15 mL to the mouth or throat at bedtime as needed. , Disp: , Rfl:      cholecalciferol, vitamin D3, 1,000 unit tablet, [CHOLECALCIFEROL, VITAMIN D3, 1,000 UNIT TABLET] Take 1,000 Units by mouth daily., Disp: , Rfl:      diclofenac sodium (VOLTAREN) 1 % Gel, [DICLOFENAC SODIUM  (VOLTAREN) 1 % GEL] Apply 1 g topically 3 (three) times a day., Disp: 350 g, Rfl: 3     fluticasone-umeclidinium-vilanterol (TRELEGY ELLIPTA) 100-62.5-25 mcg DsDv inhaler, [FLUTICASONE-UMECLIDINIUM-VILANTEROL (TRELEGY ELLIPTA) 100-62.5-25 MCG DSDV INHALER] Inhale 1 Inhalation daily., Disp: 1 each, Rfl: 3     furosemide (LASIX) 40 MG tablet, [FUROSEMIDE (LASIX) 40 MG TABLET] Take 1 tablet (40 mg total) by mouth 2 (two) times a day., Disp: 180 tablet, Rfl: 1     gabapentin (NEURONTIN) 300 MG capsule, [GABAPENTIN (NEURONTIN) 300 MG CAPSULE] Take 2 capsules (600 mg total) by mouth 2 (two) times a day., Disp: 360 capsule, Rfl: 2     ipratropium-albuteroL (DUO-NEB) 0.5-2.5 mg/3 mL nebulizer, [IPRATROPIUM-ALBUTEROL (DUO-NEB) 0.5-2.5 MG/3 ML NEBULIZER] Inhale 3 mL 4 (four) times a day., Disp: 120 vial, Rfl: 1     loperamide (IMODIUM) 2 mg capsule, [LOPERAMIDE (IMODIUM) 2 MG CAPSULE] Take 4 mg by mouth as needed for diarrhea (Every 3 hours as needed for Diarrhea)., Disp: , Rfl:      magnesium chloride (SLOW-MAG) 64 mg TbEC delayed-release tablet, [MAGNESIUM CHLORIDE (SLOW-MAG) 64 MG TBEC DELAYED-RELEASE TABLET] Take 1 tablet (64 mg total) by mouth daily., Disp: 90 tablet, Rfl: 3     melatonin 3 mg Tab tablet, [MELATONIN 3 MG TAB TABLET] Take 1 tablet (3 mg total) by mouth at bedtime., Disp: , Rfl: 0     MYRBETRIQ 50 mg Tb24 ER tablet, [MYRBETRIQ 50 MG TB24 ER TABLET] TAKE 1 TABLET DAILY, Disp: 90 tablet, Rfl: 3     polyethylene glycol (MIRALAX) 17 gram packet, [POLYETHYLENE GLYCOL (MIRALAX) 17 GRAM PACKET] Take 1 packet (17 g total) by mouth daily as needed., Disp: , Rfl: 0     potassium chloride (KLOR-CON) 10 MEQ CR tablet, [POTASSIUM CHLORIDE (KLOR-CON) 10 MEQ CR TABLET] TAKE 1 TABLET(10 MEQ) BY MOUTH TWICE DAILY, Disp: 180 tablet, Rfl: 0     rivaroxaban ANTICOAGULANT (XARELTO) 15 mg tablet, [RIVAROXABAN ANTICOAGULANT (XARELTO) 15 MG TABLET] Take 15 mg by mouth daily with supper., Disp: , Rfl:      simvastatin (ZOCOR) 40  "MG tablet, [SIMVASTATIN (ZOCOR) 40 MG TABLET] Take 1 tablet (40 mg total) by mouth at bedtime., Disp: 90 tablet, Rfl: 3     solifenacin (VESICARE) 5 MG tablet, [SOLIFENACIN (VESICARE) 5 MG TABLET] Take 1 tablet (5 mg total) by mouth at bedtime., Disp: 90 tablet, Rfl: 3     traMADol (ULTRAM) 50 MG tablet, Take 1 tablet (50 mg) by mouth 2 times daily as needed for severe pain, Disp: 28 tablet, Rfl: 0     ATROVENT HFA 17 mcg/actuation inhaler, [ATROVENT HFA 17 MCG/ACTUATION INHALER] USE 2 INHALATIONS EVERY 6 HOURS, Disp: 1 Inhaler, Rfl: 3     EPINEPHrine (EPIPEN/ADRENACLICK/AUVI-Q) 0.3 mg/0.3 mL injection, [EPINEPHRINE (EPIPEN/ADRENACLICK/AUVI-Q) 0.3 MG/0.3 ML INJECTION] Inject 0.3 mg into the shoulder, thigh, or buttocks. (Patient not taking: Reported on 7/29/2021), Disp: , Rfl:      metoprolol succinate (TOPROL-XL) 50 MG 24 hr tablet, Take 75 mg by mouth At Bedtime , Disp: , Rfl:      tamoxifen (NOLVADEX) 20 MG tablet, [TAMOXIFEN (NOLVADEX) 20 MG TABLET] TAKE 1 TABLET DAILY, Disp: 90 tablet, Rfl: 3    Allergies/intolerances:    Allergies   Allergen Reactions     Sulfa (Sulfonamide Antibiotics) [Sulfa Drugs] Rash     Headaches and dizziness.     Homeopathic Drugs [External Allergen Needs Reconciliation - See Comment] Unknown and Other (See Comments)     Comment: runny nose, Comment: runny nose     Mold Extracts [Molds & Smuts] Unknown     Morphine (Pf) [Morphine] Unknown     hallucinate     Lisinopril Cough, Unknown and Itching     Comment: cough, Comment: cough     Sulfacetamide Sodium [Sulfacetamide] Rash            Review of Systems:  History obtained from chart review and the patient  Fatigue: mild  Anorexia: moderate  Drowsiness: none  Constipation: none  Agitation: none  Depression: yes    Palliative Performance Score:     50%- 1. Mainly sit/lie; 2. Unable to do any work, extensive disease; 3. Considerable assistance required; 4. Normal or reduced; 5. Full or confusion  Physical Exam:  Ht 1.499 m (4' 11\")   Wt " 54.4 kg (120 lb)   BMI 24.24 kg/m    General appearance: alert, appears stated age, cooperative and no distress  Head: Normocephalic, without obvious abnormality, atraumatic  Nose: no discharge, nasal cannula in place  Lungs: mildly labored  Neurologic: Grossly normal  Wt Readings from Last 3 Encounters:   08/05/21 54.4 kg (120 lb)   07/29/21 57.5 kg (126 lb 11.2 oz)   07/05/21 52.6 kg (116 lb)         Pertinent Labs  Lab Results: personally reviewed.   Lab Results   Component Value Date     07/29/2021    CO2 24 07/29/2021    BUN 41 07/29/2021     Lab Results   Component Value Date    WBC 10.0 06/02/2021    HGB 13.3 06/02/2021    HCT 41.7 06/02/2021    MCV 88 06/02/2021     06/02/2021     AST   Date Value Ref Range Status   07/29/2021 23 0 - 40 U/L Final     ALT   Date Value Ref Range Status   07/29/2021 12 0 - 45 U/L Final     Alkaline Phosphatase   Date Value Ref Range Status   07/29/2021 66 45 - 120 U/L Final     Albumin   Date Value Ref Range Status   07/29/2021 3.9 3.5 - 5.0 g/dL Final      Pertinent Radiology  No results found for this visit on 08/05/21.      TTS: I have personally spent a total of 70 minutes today reviewing patient's medical record, consultation with the medical providers, assessing patient and symptoms and providing emotional support.    DELMIS Dobson, CNS  Palliative Care  [phone]

## 2021-08-06 RX ORDER — ALBUTEROL SULFATE 90 UG/1
2 AEROSOL, METERED RESPIRATORY (INHALATION) EVERY 4 HOURS PRN
Qty: 17 G | Refills: 1 | Status: ON HOLD | OUTPATIENT
Start: 2021-08-06 | End: 2022-04-22

## 2021-08-11 DIAGNOSIS — E87.6 HYPOKALEMIA: Primary | ICD-10-CM

## 2021-08-12 RX ORDER — POTASSIUM CHLORIDE 750 MG/1
10 TABLET, EXTENDED RELEASE ORAL 2 TIMES DAILY
Qty: 180 TABLET | Refills: 3 | Status: SHIPPED | OUTPATIENT
Start: 2021-08-12 | End: 2022-06-30

## 2021-08-17 ENCOUNTER — OFFICE VISIT (OUTPATIENT)
Dept: FAMILY MEDICINE | Facility: CLINIC | Age: 76
End: 2021-08-17
Payer: MEDICARE

## 2021-08-17 VITALS
WEIGHT: 124.7 LBS | DIASTOLIC BLOOD PRESSURE: 60 MMHG | OXYGEN SATURATION: 96 % | HEART RATE: 67 BPM | SYSTOLIC BLOOD PRESSURE: 110 MMHG | BODY MASS INDEX: 25.19 KG/M2

## 2021-08-17 DIAGNOSIS — G89.29 CHRONIC LEFT HIP PAIN: ICD-10-CM

## 2021-08-17 DIAGNOSIS — J44.1 COPD EXACERBATION (H): Primary | ICD-10-CM

## 2021-08-17 DIAGNOSIS — M25.552 CHRONIC LEFT HIP PAIN: ICD-10-CM

## 2021-08-17 PROCEDURE — 99212 OFFICE O/P EST SF 10 MIN: CPT | Performed by: NURSE PRACTITIONER

## 2021-08-17 RX ORDER — DOXYCYCLINE HYCLATE 100 MG
100 TABLET ORAL 2 TIMES DAILY
Qty: 20 TABLET | Refills: 0 | Status: SHIPPED | OUTPATIENT
Start: 2021-08-17 | End: 2021-09-07

## 2021-08-17 RX ORDER — PREDNISONE 20 MG/1
40 TABLET ORAL DAILY
Qty: 10 TABLET | Refills: 0 | Status: SHIPPED | OUTPATIENT
Start: 2021-08-17 | End: 2021-08-23
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
## 2021-08-17 NOTE — PATIENT INSTRUCTIONS
Prednisone 40 mg daily for 5 days.  Take in the morning with food.    Doxycycline, 1 tablet twice daily for 10 days.    Do your DuoNeb treatments as needed for shortness of breath/wheezing.  4 times daily, as needed.    You saw Dr. Zamora in 2014 for your hip surgery.  It sounds like your left hip is giving you problems again, go back and see your hip surgeon for follow-up.

## 2021-08-23 ENCOUNTER — OFFICE VISIT (OUTPATIENT)
Dept: CARDIOLOGY | Facility: CLINIC | Age: 76
End: 2021-08-23
Payer: MEDICARE

## 2021-08-23 VITALS
WEIGHT: 120 LBS | RESPIRATION RATE: 16 BRPM | BODY MASS INDEX: 24.24 KG/M2 | OXYGEN SATURATION: 91 % | DIASTOLIC BLOOD PRESSURE: 62 MMHG | HEART RATE: 60 BPM | SYSTOLIC BLOOD PRESSURE: 120 MMHG

## 2021-08-23 DIAGNOSIS — N18.4 CHRONIC KIDNEY DISEASE, STAGE IV (SEVERE) (H): ICD-10-CM

## 2021-08-23 DIAGNOSIS — I10 BENIGN ESSENTIAL HYPERTENSION: ICD-10-CM

## 2021-08-23 DIAGNOSIS — J43.9 PULMONARY EMPHYSEMA, UNSPECIFIED EMPHYSEMA TYPE (H): ICD-10-CM

## 2021-08-23 DIAGNOSIS — I50.22 CHRONIC SYSTOLIC CONGESTIVE HEART FAILURE (H): ICD-10-CM

## 2021-08-23 DIAGNOSIS — I42.8 NONISCHEMIC CARDIOMYOPATHY (H): ICD-10-CM

## 2021-08-23 PROBLEM — J44.9 COPD (CHRONIC OBSTRUCTIVE PULMONARY DISEASE) (H): Status: ACTIVE | Noted: 2021-08-23

## 2021-08-23 PROCEDURE — 99214 OFFICE O/P EST MOD 30 MIN: CPT | Performed by: NURSE PRACTITIONER

## 2021-08-23 RX ORDER — FUROSEMIDE 40 MG
40 TABLET ORAL 2 TIMES DAILY
Qty: 180 TABLET | Refills: 3 | Status: SHIPPED | OUTPATIENT
Start: 2021-08-23 | End: 2021-08-24

## 2021-08-23 NOTE — PROGRESS NOTES
Assessment/Recommendations   Assessment:    1.  Nonischemic cardiomyopathy with systolic dysfunction with normalization of LVEF at 55 to 60%, NYHA class III: Compensated.  She continues to have chronic dyspnea on exertion and mild fatigue.  Her lower extremity edema has improved.  We discussed the results of her recent echocardiogram.  Her weight has been stable.    2.  Hypertension: Controlled.  Blood pressure 120/62    3.  Chronic kidney disease stage IV: She had labs the end of July which were stable.    Plan  1.  Continue current medications  2.  Continue monitoring weights and low-sodium diet  3.  Encourage activity    Irene León will follow up with Dr Chilel July 2022 and in the heart failure clinic in 3 months.     History of Present Illness/Subjective    Ms. Irene León is a 76 year old female seen at [facility] heart failure clinic today for continued follow-up.   She follows up for nonischemic cardiomyopathy with systolic dysfunction.  Her last echocardiogram in July showed an improved ejection fraction of 55-60%.  She has a past medical history significant for COPD on oxygen, hypertension, dyslipidemia, chronic kidney disease stage IV, thoracic compression fracture.    Today, she continues to have fatigue, dyspnea on exertion and trace pedal edema.  Her edema has improved since I saw her a month ago.  She denies orthopnea, PND or chest pain.  She denies lightheadedness, orthopnea, PND, palpitations, chest pain and abdominal fullness/bloating.      She is monitoring home weights which are stable.  She is following a low sodium diet.      ECHOCARDIOGRAM: 7/28/2021  Interpretation Summary     Left ventricular function is normal.The ejection fraction is 55-60%.  No regional wall motion abnormalities noted.  Normal right ventricle size and systolic function.  Mild sclerodegenerative valve disease is present without significant stenosis  or regurgitation.  There is no comparison study  available.     Physical Examination Review of Systems   There were no vitals taken for this visit.  There is no height or weight on file to calculate BMI.  Wt Readings from Last 3 Encounters:   08/17/21 56.6 kg (124 lb 11.2 oz)   08/05/21 54.4 kg (120 lb)   07/29/21 57.5 kg (126 lb 11.2 oz)       General Appearance:   no acute distress   ENT/Mouth: membranes moist, no oral lesions or bleeding gums.      EYES:  no scleral icterus, normal conjunctivae   Neck: no carotid bruits or thyromegaly   Chest/Lungs:   lungs are clear to auscultation, no rales or wheezing, equal chest wall expansion    Cardiovascular:   Regular. Normal first and second heart sounds with no murmurs, rubs, or gallops; Jugular venous pressure normal, trace pedal edema bilaterally    Abdomen:  no organomegaly, masses, bruits, or tenderness; bowel sounds are present   Extremities: no cyanosis or clubbing   Skin: no xanthelasma, warm.    Neurologic: normal  bilateral, no tremors     Psychiatric: alert and oriented x3                                              Medical History  Surgical History Family History Social History   Past Medical History:   Diagnosis Date     ANATOLIY (acute kidney injury) (H)      Allergic rhinitis      Arrhythmia      Atrial fibrillation with RVR (H)      Bacteremia      Breast cancer (H) 2017     Cardiomyopathy (H)      Centrilobular emphysema (H)      CHF (congestive heart failure) (H)      CKD (chronic kidney disease)      COPD (chronic obstructive pulmonary disease) (H)      Coronary artery disease      Depression      Dysphagia      E. coli sepsis (H)      Factor 5 Leiden mutation, heterozygous (H)      GERD (gastroesophageal reflux disease)      Hx of radiation therapy 2017     Hyperlipidemia      Hypertension      Hypokalemia      Hypomagnesemia      Idiopathic cardiomyopathy (H)      Left hip pain 4/30/2014     OAB (overactive bladder)      Osteoporosis      Sacral insufficiency fracture      Sinusitis      Syncope       Urge incontinence      Vocal cord dysfunction     Past Surgical History:   Procedure Laterality Date     ARTHROPLASTY REVISION HIP Left      BIOPSY BREAST Right 2017     BLADDER SURGERY      bladder interstim with removal     C OPEN TX FEMORAL FRACTURE DISTAL MED/LAT CONDYLE Left 10/28/2015    Procedure: OPEN REDUCTION INTERNAL FIXATION LEFT  PROXIMAL FEMUR PERIPROSTHETIC FRACTURE;  Surgeon: Yovanny Albarran MD;  Location: [facility] Main OR;  Service: Orthopedics     CARDIAC CATHETERIZATION       CATARACT EXTRACTION Left 2017     IR ABDOMINAL AORTOGRAM  2003     IR AORTIC ARCH 4 VESSEL ANGIOGRAM  2003     IR MISCELLANEOUS PROCEDURE  2003     LUMPECTOMY BREAST Left 06/2017    x2    Family History   Problem Relation Age of Onset     Osteoporosis Other      Prostate Cancer Brother      Breast Cancer Maternal Aunt         age thought to be in her 70's-80's     Prostate Cancer Maternal Uncle     Social History     Socioeconomic History     Marital status:      Spouse name: Not on file     Number of children: Not on file     Years of education: Not on file     Highest education level: Not on file   Occupational History     Not on file   Tobacco Use     Smoking status: Former Smoker     Quit date: 10/28/2007     Years since quittin.8     Smokeless tobacco: Former User     Quit date: 2004   Substance and Sexual Activity     Alcohol use: No     Drug use: No     Sexual activity: Not on file   Other Topics Concern     Not on file   Social History Narrative     and lives in home with 3 flight of steps     Social Determinants of Health     Financial Resource Strain:      Difficulty of Paying Living Expenses:    Food Insecurity:      Worried About Running Out of Food in the Last Year:      Ran Out of Food in the Last Year:    Transportation Needs:      Lack of Transportation (Medical):      Lack of Transportation (Non-Medical):    Physical Activity:      Days of Exercise per Week:       Minutes of Exercise per Session:    Stress:      Feeling of Stress :    Social Connections:      Frequency of Communication with Friends and Family:      Frequency of Social Gatherings with Friends and Family:      Attends Sikh Services:      Active Member of Clubs or Organizations:      Attends Club or Organization Meetings:      Marital Status:    Intimate Partner Violence:      Fear of Current or Ex-Partner:      Emotionally Abused:      Physically Abused:      Sexually Abused:           Medications  Allergies   Current Outpatient Medications   Medication Sig Dispense Refill     acetaminophen (TYLENOL) 500 MG tablet [ACETAMINOPHEN (TYLENOL) 500 MG TABLET] Take 1,000 mg by mouth 3 (three) times a day.       albuterol (PROAIR HFA/PROVENTIL HFA/VENTOLIN HFA) 108 (90 Base) MCG/ACT inhaler Inhale 2 puffs into the lungs every 4 hours as needed for shortness of breath / dyspnea 17 g 1     ascorbic acid, vitamin C, (VITAMIN C) 1000 MG tablet [ASCORBIC ACID, VITAMIN C, (VITAMIN C) 1000 MG TABLET] Take 1,000 mg by mouth daily. Airborne 1 tab daily       ATROVENT HFA 17 mcg/actuation inhaler [ATROVENT HFA 17 MCG/ACTUATION INHALER] USE 2 INHALATIONS EVERY 6 HOURS 1 Inhaler 3     azelastine (ASTELIN) 137 mcg (0.1 %) nasal spray [AZELASTINE (ASTELIN) 137 MCG (0.1 %) NASAL SPRAY] 2 sprays into each nostril daily.       cetirizine (ZYRTEC) 10 MG tablet Take 10 mg by mouth       chlorhexidine (PERIDEX) 0.12 % solution [CHLORHEXIDINE (PERIDEX) 0.12 % SOLUTION] Apply 15 mL to the mouth or throat at bedtime as needed.        cholecalciferol, vitamin D3, 1,000 unit tablet [CHOLECALCIFEROL, VITAMIN D3, 1,000 UNIT TABLET] Take 1,000 Units by mouth daily.       diclofenac sodium (VOLTAREN) 1 % Gel [DICLOFENAC SODIUM (VOLTAREN) 1 % GEL] Apply 1 g topically 3 (three) times a day. 350 g 3     doxycycline hyclate (VIBRA-TABS) 100 MG tablet Take 1 tablet (100 mg) by mouth 2 times daily 20 tablet 0     EPINEPHrine  (EPIPEN/ADRENACLICK/AUVI-Q) 0.3 mg/0.3 mL injection [EPINEPHRINE (EPIPEN/ADRENACLICK/AUVI-Q) 0.3 MG/0.3 ML INJECTION] Inject 0.3 mg into the shoulder, thigh, or buttocks. (Patient not taking: Reported on 7/29/2021)       fluticasone-umeclidinium-vilanterol (TRELEGY ELLIPTA) 100-62.5-25 mcg DsDv inhaler [FLUTICASONE-UMECLIDINIUM-VILANTEROL (TRELEGY ELLIPTA) 100-62.5-25 MCG DSDV INHALER] Inhale 1 Inhalation daily. 1 each 3     furosemide (LASIX) 40 MG tablet [FUROSEMIDE (LASIX) 40 MG TABLET] Take 1 tablet (40 mg total) by mouth 2 (two) times a day. 180 tablet 1     gabapentin (NEURONTIN) 300 MG capsule [GABAPENTIN (NEURONTIN) 300 MG CAPSULE] Take 2 capsules (600 mg total) by mouth 2 (two) times a day. 360 capsule 2     ipratropium-albuteroL (DUO-NEB) 0.5-2.5 mg/3 mL nebulizer [IPRATROPIUM-ALBUTEROL (DUO-NEB) 0.5-2.5 MG/3 ML NEBULIZER] Inhale 3 mL 4 (four) times a day. 120 vial 1     loperamide (IMODIUM) 2 mg capsule [LOPERAMIDE (IMODIUM) 2 MG CAPSULE] Take 4 mg by mouth as needed for diarrhea (Every 3 hours as needed for Diarrhea).       magnesium chloride (SLOW-MAG) 64 mg TbEC delayed-release tablet [MAGNESIUM CHLORIDE (SLOW-MAG) 64 MG TBEC DELAYED-RELEASE TABLET] Take 1 tablet (64 mg total) by mouth daily. 90 tablet 3     melatonin 3 mg Tab tablet [MELATONIN 3 MG TAB TABLET] Take 1 tablet (3 mg total) by mouth at bedtime.  0     metoprolol succinate (TOPROL-XL) 50 MG 24 hr tablet Take 75 mg by mouth At Bedtime        MYRBETRIQ 50 mg Tb24 ER tablet [MYRBETRIQ 50 MG TB24 ER TABLET] TAKE 1 TABLET DAILY 90 tablet 3     polyethylene glycol (MIRALAX) 17 gram packet [POLYETHYLENE GLYCOL (MIRALAX) 17 GRAM PACKET] Take 1 packet (17 g total) by mouth daily as needed.  0     potassium chloride (KLOR-CON) 10 MEQ CR tablet [POTASSIUM CHLORIDE (KLOR-CON) 10 MEQ CR TABLET] TAKE 1 TABLET(10 MEQ) BY MOUTH TWICE DAILY 180 tablet 0     potassium chloride ER (K-TAB/KLOR-CON) 10 MEQ CR tablet Take 1 tablet (10 mEq) by mouth 2 times  daily 180 tablet 3     rivaroxaban ANTICOAGULANT (XARELTO) 15 mg tablet [RIVAROXABAN ANTICOAGULANT (XARELTO) 15 MG TABLET] Take 15 mg by mouth daily with supper.       simvastatin (ZOCOR) 40 MG tablet [SIMVASTATIN (ZOCOR) 40 MG TABLET] Take 1 tablet (40 mg total) by mouth at bedtime. 90 tablet 3     solifenacin (VESICARE) 5 MG tablet [SOLIFENACIN (VESICARE) 5 MG TABLET] Take 1 tablet (5 mg total) by mouth at bedtime. 90 tablet 3     tamoxifen (NOLVADEX) 20 MG tablet [TAMOXIFEN (NOLVADEX) 20 MG TABLET] TAKE 1 TABLET DAILY 90 tablet 3    Allergies   Allergen Reactions     Sulfa (Sulfonamide Antibiotics) [Sulfa Drugs] Rash     Headaches and dizziness.     Homeopathic Drugs [External Allergen Needs Reconciliation - See Comment] Unknown and Other (See Comments)     Comment: runny nose, Comment: runny nose     Mold Extracts [Molds & Smuts] Unknown     Morphine (Pf) [Morphine] Unknown     hallucinate     Lisinopril Cough, Unknown and Itching     Comment: cough, Comment: cough     Sulfacetamide Sodium [Sulfacetamide] Rash         Lab Results    Chemistry/lipid CBC Cardiac Enzymes/BNP/TSH/INR   Lab Results   Component Value Date    BUN 41 (H) 07/29/2021     07/29/2021    CO2 24 07/29/2021    Lab Results   Component Value Date    WBC 10.0 06/02/2021    HGB 13.3 06/02/2021    HCT 41.7 06/02/2021    MCV 88 06/02/2021     06/02/2021    Lab Results   Component Value Date    TROPONINI 0.03 03/10/2021     (H) 03/09/2021    INR 1.16 (H) 02/20/2021             This note has been dictated using voice recognition software. Any grammatical, typographical, or context distortions are unintentional and inherent to the software    30 minutes spent on the date of encounter doing chart review, review of outside records, review of test results, interpretation with above tests, patient visit and documentation.

## 2021-08-23 NOTE — PATIENT INSTRUCTIONS
Irene León,    It was a pleasure to see you today at [facility].     My recommendations after this visit include:  - Please follow up with Dr Cihlel July 2022   - Please follow up with Samantha Herrera in 3 months  - Continue current medications    Samantha Herrera, CNP

## 2021-08-23 NOTE — LETTER
8/23/2021    Pankaj Montanez MD  [address] [place] [address]    RE: Irene León       Dear Colleague,    I had the pleasure of seeing Irene León in the [facility].          Assessment/Recommendations   Assessment:    1.  Nonischemic cardiomyopathy with systolic dysfunction with normalization of LVEF at 55 to 60%, NYHA class III: Compensated.  She continues to have chronic dyspnea on exertion and mild fatigue.  Her lower extremity edema has improved.  We discussed the results of her recent echocardiogram.  Her weight has been stable.    2.  Hypertension: Controlled.  Blood pressure 120/62    3.  Chronic kidney disease stage IV: She had labs the end of July which were stable.    Plan  1.  Continue current medications  2.  Continue monitoring weights and low-sodium diet  3.  Encourage activity    Irene León will follow up with Dr Chilel July 2022 and in the heart failure clinic in 3 months.     History of Present Illness/Subjective    Ms. Irene León is a 76 year old female seen at [facility] heart failure clinic today for continued follow-up.   She follows up for nonischemic cardiomyopathy with systolic dysfunction.  Her last echocardiogram in July showed an improved ejection fraction of 55-60%.  She has a past medical history significant for COPD on oxygen, hypertension, dyslipidemia, chronic kidney disease stage IV, thoracic compression fracture.    Today, she continues to have fatigue, dyspnea on exertion and trace pedal edema.  Her edema has improved since I saw her a month ago.  She denies orthopnea, PND or chest pain.  She denies lightheadedness, orthopnea, PND, palpitations, chest pain and abdominal fullness/bloating.      She is monitoring home weights which are stable.  She is following a low sodium diet.      ECHOCARDIOGRAM: 7/28/2021  Interpretation Summary     Left ventricular function is normal.The ejection fraction  is 55-60%.  No regional wall motion abnormalities noted.  Normal right ventricle size and systolic function.  Mild sclerodegenerative valve disease is present without significant stenosis  or regurgitation.  There is no comparison study available.     Physical Examination Review of Systems   There were no vitals taken for this visit.  There is no height or weight on file to calculate BMI.  Wt Readings from Last 3 Encounters:   08/17/21 56.6 kg (124 lb 11.2 oz)   08/05/21 54.4 kg (120 lb)   07/29/21 57.5 kg (126 lb 11.2 oz)       General Appearance:   no acute distress   ENT/Mouth: membranes moist, no oral lesions or bleeding gums.      EYES:  no scleral icterus, normal conjunctivae   Neck: no carotid bruits or thyromegaly   Chest/Lungs:   lungs are clear to auscultation, no rales or wheezing, equal chest wall expansion    Cardiovascular:   Regular. Normal first and second heart sounds with no murmurs, rubs, or gallops; Jugular venous pressure normal, trace pedal edema bilaterally    Abdomen:  no organomegaly, masses, bruits, or tenderness; bowel sounds are present   Extremities: no cyanosis or clubbing   Skin: no xanthelasma, warm.    Neurologic: normal  bilateral, no tremors     Psychiatric: alert and oriented x3                                              Medical History  Surgical History Family History Social History   Past Medical History:   Diagnosis Date     ANATOLIY (acute kidney injury) (H)      Allergic rhinitis      Arrhythmia      Atrial fibrillation with RVR (H)      Bacteremia      Breast cancer (H) 2017     Cardiomyopathy (H)      Centrilobular emphysema (H)      CHF (congestive heart failure) (H)      CKD (chronic kidney disease)      COPD (chronic obstructive pulmonary disease) (H)      Coronary artery disease      Depression      Dysphagia      E. coli sepsis (H)      Factor 5 Leiden mutation, heterozygous (H)      GERD (gastroesophageal reflux disease)      Hx of radiation therapy 2017      Hyperlipidemia      Hypertension      Hypokalemia      Hypomagnesemia      Idiopathic cardiomyopathy (H)      Left hip pain 2014     OAB (overactive bladder)      Osteoporosis      Sacral insufficiency fracture      Sinusitis      Syncope      Urge incontinence      Vocal cord dysfunction     Past Surgical History:   Procedure Laterality Date     ARTHROPLASTY REVISION HIP Left      BIOPSY BREAST Right 2017     BLADDER SURGERY      bladder interstim with removal     C OPEN TX FEMORAL FRACTURE DISTAL MED/LAT CONDYLE Left 10/28/2015    Procedure: OPEN REDUCTION INTERNAL FIXATION LEFT  PROXIMAL FEMUR PERIPROSTHETIC FRACTURE;  Surgeon: Yovanny Albarran MD;  Location: [facility] OR;  Service: Orthopedics     CARDIAC CATHETERIZATION       CATARACT EXTRACTION Left 2017     IR ABDOMINAL AORTOGRAM  2003     IR AORTIC ARCH 4 VESSEL ANGIOGRAM  2003     IR MISCELLANEOUS PROCEDURE  2003     LUMPECTOMY BREAST Left 06/2017    x2    Family History   Problem Relation Age of Onset     Osteoporosis Other      Prostate Cancer Brother      Breast Cancer Maternal Aunt         age thought to be in her 70's-80's     Prostate Cancer Maternal Uncle     Social History     Socioeconomic History     Marital status:      Spouse name: Not on file     Number of children: Not on file     Years of education: Not on file     Highest education level: Not on file   Occupational History     Not on file   Tobacco Use     Smoking status: Former Smoker     Quit date: 10/28/2007     Years since quittin.8     Smokeless tobacco: Former User     Quit date: 2004   Substance and Sexual Activity     Alcohol use: No     Drug use: No     Sexual activity: Not on file   Other Topics Concern     Not on file   Social History Narrative     and lives in home with 3 flight of steps     Social Determinants of Health     Financial Resource Strain:      Difficulty of Paying Living Expenses:    Food Insecurity:      Worried  About Running Out of Food in the Last Year:      Ran Out of Food in the Last Year:    Transportation Needs:      Lack of Transportation (Medical):      Lack of Transportation (Non-Medical):    Physical Activity:      Days of Exercise per Week:      Minutes of Exercise per Session:    Stress:      Feeling of Stress :    Social Connections:      Frequency of Communication with Friends and Family:      Frequency of Social Gatherings with Friends and Family:      Attends Judaism Services:      Active Member of Clubs or Organizations:      Attends Club or Organization Meetings:      Marital Status:    Intimate Partner Violence:      Fear of Current or Ex-Partner:      Emotionally Abused:      Physically Abused:      Sexually Abused:           Medications  Allergies   Current Outpatient Medications   Medication Sig Dispense Refill     acetaminophen (TYLENOL) 500 MG tablet [ACETAMINOPHEN (TYLENOL) 500 MG TABLET] Take 1,000 mg by mouth 3 (three) times a day.       albuterol (PROAIR HFA/PROVENTIL HFA/VENTOLIN HFA) 108 (90 Base) MCG/ACT inhaler Inhale 2 puffs into the lungs every 4 hours as needed for shortness of breath / dyspnea 17 g 1     ascorbic acid, vitamin C, (VITAMIN C) 1000 MG tablet [ASCORBIC ACID, VITAMIN C, (VITAMIN C) 1000 MG TABLET] Take 1,000 mg by mouth daily. Airborne 1 tab daily       ATROVENT HFA 17 mcg/actuation inhaler [ATROVENT HFA 17 MCG/ACTUATION INHALER] USE 2 INHALATIONS EVERY 6 HOURS 1 Inhaler 3     azelastine (ASTELIN) 137 mcg (0.1 %) nasal spray [AZELASTINE (ASTELIN) 137 MCG (0.1 %) NASAL SPRAY] 2 sprays into each nostril daily.       cetirizine (ZYRTEC) 10 MG tablet Take 10 mg by mouth       chlorhexidine (PERIDEX) 0.12 % solution [CHLORHEXIDINE (PERIDEX) 0.12 % SOLUTION] Apply 15 mL to the mouth or throat at bedtime as needed.        cholecalciferol, vitamin D3, 1,000 unit tablet [CHOLECALCIFEROL, VITAMIN D3, 1,000 UNIT TABLET] Take 1,000 Units by mouth daily.       diclofenac sodium  (VOLTAREN) 1 % Gel [DICLOFENAC SODIUM (VOLTAREN) 1 % GEL] Apply 1 g topically 3 (three) times a day. 350 g 3     doxycycline hyclate (VIBRA-TABS) 100 MG tablet Take 1 tablet (100 mg) by mouth 2 times daily 20 tablet 0     EPINEPHrine (EPIPEN/ADRENACLICK/AUVI-Q) 0.3 mg/0.3 mL injection [EPINEPHRINE (EPIPEN/ADRENACLICK/AUVI-Q) 0.3 MG/0.3 ML INJECTION] Inject 0.3 mg into the shoulder, thigh, or buttocks. (Patient not taking: Reported on 7/29/2021)       fluticasone-umeclidinium-vilanterol (TRELEGY ELLIPTA) 100-62.5-25 mcg DsDv inhaler [FLUTICASONE-UMECLIDINIUM-VILANTEROL (TRELEGY ELLIPTA) 100-62.5-25 MCG DSDV INHALER] Inhale 1 Inhalation daily. 1 each 3     furosemide (LASIX) 40 MG tablet [FUROSEMIDE (LASIX) 40 MG TABLET] Take 1 tablet (40 mg total) by mouth 2 (two) times a day. 180 tablet 1     gabapentin (NEURONTIN) 300 MG capsule [GABAPENTIN (NEURONTIN) 300 MG CAPSULE] Take 2 capsules (600 mg total) by mouth 2 (two) times a day. 360 capsule 2     ipratropium-albuteroL (DUO-NEB) 0.5-2.5 mg/3 mL nebulizer [IPRATROPIUM-ALBUTEROL (DUO-NEB) 0.5-2.5 MG/3 ML NEBULIZER] Inhale 3 mL 4 (four) times a day. 120 vial 1     loperamide (IMODIUM) 2 mg capsule [LOPERAMIDE (IMODIUM) 2 MG CAPSULE] Take 4 mg by mouth as needed for diarrhea (Every 3 hours as needed for Diarrhea).       magnesium chloride (SLOW-MAG) 64 mg TbEC delayed-release tablet [MAGNESIUM CHLORIDE (SLOW-MAG) 64 MG TBEC DELAYED-RELEASE TABLET] Take 1 tablet (64 mg total) by mouth daily. 90 tablet 3     melatonin 3 mg Tab tablet [MELATONIN 3 MG TAB TABLET] Take 1 tablet (3 mg total) by mouth at bedtime.  0     metoprolol succinate (TOPROL-XL) 50 MG 24 hr tablet Take 75 mg by mouth At Bedtime        MYRBETRIQ 50 mg Tb24 ER tablet [MYRBETRIQ 50 MG TB24 ER TABLET] TAKE 1 TABLET DAILY 90 tablet 3     polyethylene glycol (MIRALAX) 17 gram packet [POLYETHYLENE GLYCOL (MIRALAX) 17 GRAM PACKET] Take 1 packet (17 g total) by mouth daily as needed.  0     potassium chloride  (KLOR-CON) 10 MEQ CR tablet [POTASSIUM CHLORIDE (KLOR-CON) 10 MEQ CR TABLET] TAKE 1 TABLET(10 MEQ) BY MOUTH TWICE DAILY 180 tablet 0     potassium chloride ER (K-TAB/KLOR-CON) 10 MEQ CR tablet Take 1 tablet (10 mEq) by mouth 2 times daily 180 tablet 3     rivaroxaban ANTICOAGULANT (XARELTO) 15 mg tablet [RIVAROXABAN ANTICOAGULANT (XARELTO) 15 MG TABLET] Take 15 mg by mouth daily with supper.       simvastatin (ZOCOR) 40 MG tablet [SIMVASTATIN (ZOCOR) 40 MG TABLET] Take 1 tablet (40 mg total) by mouth at bedtime. 90 tablet 3     solifenacin (VESICARE) 5 MG tablet [SOLIFENACIN (VESICARE) 5 MG TABLET] Take 1 tablet (5 mg total) by mouth at bedtime. 90 tablet 3     tamoxifen (NOLVADEX) 20 MG tablet [TAMOXIFEN (NOLVADEX) 20 MG TABLET] TAKE 1 TABLET DAILY 90 tablet 3    Allergies   Allergen Reactions     Sulfa (Sulfonamide Antibiotics) [Sulfa Drugs] Rash     Headaches and dizziness.     Homeopathic Drugs [External Allergen Needs Reconciliation - See Comment] Unknown and Other (See Comments)     Comment: runny nose, Comment: runny nose     Mold Extracts [Molds & Smuts] Unknown     Morphine (Pf) [Morphine] Unknown     hallucinate     Lisinopril Cough, Unknown and Itching     Comment: cough, Comment: cough     Sulfacetamide Sodium [Sulfacetamide] Rash         Lab Results    Chemistry/lipid CBC Cardiac Enzymes/BNP/TSH/INR   Lab Results   Component Value Date    BUN 41 (H) 07/29/2021     07/29/2021    CO2 24 07/29/2021    Lab Results   Component Value Date    WBC 10.0 06/02/2021    HGB 13.3 06/02/2021    HCT 41.7 06/02/2021    MCV 88 06/02/2021     06/02/2021    Lab Results   Component Value Date    TROPONINI 0.03 03/10/2021     (H) 03/09/2021    INR 1.16 (H) 02/20/2021             This note has been dictated using voice recognition software. Any grammatical, typographical, or context distortions are unintentional and inherent to the software    30 minutes spent on the date of encounter doing chart review,  review of outside records, review of test results, interpretation with above tests, patient visit and documentation.                  Thank you for allowing me to participate in the care of your patient.      Sincerely,     DELMIS Watson [facility]  cc:   No referring provider defined for this encounter.

## 2021-08-24 DIAGNOSIS — I50.22 CHRONIC SYSTOLIC CONGESTIVE HEART FAILURE (H): ICD-10-CM

## 2021-08-24 RX ORDER — FUROSEMIDE 40 MG
40 TABLET ORAL 2 TIMES DAILY
Qty: 180 TABLET | Refills: 1 | Status: SHIPPED | OUTPATIENT
Start: 2021-08-24 | End: 2021-12-30

## 2021-08-27 ENCOUNTER — TRANSFERRED RECORDS (OUTPATIENT)
Dept: HEALTH INFORMATION MANAGEMENT | Facility: CLINIC | Age: 76
End: 2021-08-27

## 2021-09-07 ENCOUNTER — VIRTUAL VISIT (OUTPATIENT)
Dept: INTERNAL MEDICINE | Facility: CLINIC | Age: 76
End: 2021-09-07
Payer: MEDICARE

## 2021-09-07 DIAGNOSIS — R10.13 DYSPEPSIA: ICD-10-CM

## 2021-09-07 DIAGNOSIS — G89.4 CHRONIC PAIN SYNDROME: ICD-10-CM

## 2021-09-07 DIAGNOSIS — G47.00 INSOMNIA, UNSPECIFIED TYPE: ICD-10-CM

## 2021-09-07 DIAGNOSIS — Z87.81 HISTORY OF COMPRESSION FRACTURE OF SPINE: ICD-10-CM

## 2021-09-07 DIAGNOSIS — J43.9 PULMONARY EMPHYSEMA, UNSPECIFIED EMPHYSEMA TYPE (H): Primary | ICD-10-CM

## 2021-09-07 PROCEDURE — 99442 PR PHYSICIAN TELEPHONE EVALUATION 11-20 MIN: CPT | Mod: 95 | Performed by: INTERNAL MEDICINE

## 2021-09-07 RX ORDER — GABAPENTIN 300 MG/1
600 CAPSULE ORAL 2 TIMES DAILY
Qty: 360 CAPSULE | Refills: 2 | Status: SHIPPED | OUTPATIENT
Start: 2021-09-07 | End: 2022-02-23

## 2021-09-07 RX ORDER — TRAMADOL HYDROCHLORIDE 50 MG/1
50 TABLET ORAL EVERY 6 HOURS PRN
COMMUNITY
Start: 2021-08-05 | End: 2021-09-20

## 2021-09-07 RX ORDER — FAMOTIDINE 20 MG/1
20 TABLET, FILM COATED ORAL 2 TIMES DAILY PRN
COMMUNITY
End: 2021-11-18

## 2021-09-07 RX ORDER — TRAZODONE HYDROCHLORIDE 50 MG/1
50 TABLET, FILM COATED ORAL AT BEDTIME
COMMUNITY
End: 2021-10-25 | Stop reason: ALTCHOICE

## 2021-09-07 NOTE — PROGRESS NOTES
Stephanie is a 76 year old who is being evaluated via a billable telephone visit.      What phone number would you like to be contacted at? [phone]  How would you like to obtain your AVS? [employer]    Assessment & Plan     Pulmonary emphysema, unspecified emphysema type (H)  Severe COPD on home oxygen with mild pulmonary hypertension seen on heart echo in July of this year.  COPD flare last month with doxycycline for 10 days and 5 days of prednisone 40 mg.  She is back to baseline now.  DuoNeb and Trelegy Ellipta.    Saw pulmonary medicine in July with 6-month routine follow-up.  No change in treatment plan at that time.    I did recommend that patient get a third COVID-19 booster vaccine this fall.  She could consider herself immunosuppressed with recent prednisone use for COPD.  She will plan to contact her local pharmacy.  She was told she could schedule on [employer] computer portal here.    Previous DNR/DNI status, but I do note that oncology had a discussion and has changed her CODE STATUS to full code currently.    History of compression fracture of spine  Chronic back pain.  T12 compression fracture October 2020.    She is planning some injections through orthopedic clinic.  Patient was told she needs to speak with orthopedic clinic about her current use of Xarelto blood thinner.  She would not be able to get spinal injections on blood thinners.  Patient was told that if she does stop her blood thinner, she would be at high risk for stroke given her history of stroke and heterozygous factor V Leiden mutation.  He was told she needs to stop her blood thinner for least 2 days before the injection and not take the day of the injection.    She is no longer on aspirin daily.    She did speak with the palliative care physician on August 5.  I did review that note.  Palliative physician did okay her tramadol up to twice a day as needed.  Patient does not want to go to the pain clinic for ongoing care and will follow  with the palliative care doctor in 2 months for continued tramadol management.    I have again recommended patient avoid tramadol because of her severe pulmonary condition and risk of falls.  Patient still feels the benefit outweighs risk and will follow up with palliative care oncology for tramadol.    Patient does plan to restart physical therapy through [facility] in the future.    Patient is still not set up an osteoporosis consult to discuss Prolia option.    Chronic pain syndrome  Tramadol management will be through the palliative care/oncology office.    I did refill her gabapentin  - traMADol (ULTRAM) 50 MG tablet  - gabapentin (NEURONTIN) 300 MG capsule  Dispense: 360 capsule; Refill: 2    History of chronic left hip pain with stiffness after hip bleeding previously.  History of mild to moderate central canal stenosis.    Daily walking and activity recommended with walker.    Insomnia, unspecified type  She has reduced her use of trazodone.  She is no longer on the venlafaxine 150 mg a day.    Patient has a history of long QTc of 516 in March of this year on his medications.  No longer on daily azithromycin.    Concerned about long QTC and ventricular arrhythmia with her use of tramadol, discussed previously.  - traZODone (DESYREL) 50 MG tablet    Dyspepsia  Controlled  - famotidine (PEPCID) 20 MG tablet    Stage I breast cancer with lumpectomy and radiation in 2017 of right breast.  On tamoxifen.    History of hilar nephritis in March.  History of overactive bladder on Myrbetriq    History of stroke.  On simvastatin 40 mg.  No longer on aspirin, just Xarelto          Review of prior external note(s) from - As noted above  No follow-ups on file.  Follow-up in 2 months in clinic    Pankaj Montanez MD  [facility]    Erik Brown is a 76 year old who presents for the following health issues recent COPD exacerbation, pain management and medication changes  discussed at her July 29 visit.  HPI   History as outlined above      Objective           Vitals:  No vitals were obtained today due to virtual visit.        Phone call duration: 14 minutes

## 2021-09-10 ENCOUNTER — TELEPHONE (OUTPATIENT)
Dept: INTERNAL MEDICINE | Facility: CLINIC | Age: 76
End: 2021-09-10

## 2021-09-10 ENCOUNTER — TELEPHONE (OUTPATIENT)
Dept: RADIATION ONCOLOGY | Facility: HOSPITAL | Age: 76
End: 2021-09-10

## 2021-09-10 DIAGNOSIS — J44.1 CHRONIC OBSTRUCTIVE PULMONARY DISEASE WITH ACUTE EXACERBATION (H): Primary | ICD-10-CM

## 2021-09-10 NOTE — TELEPHONE ENCOUNTER
"Patient called palliative care inquiring about new symptoms including difficulty swallowing, \"feels like my throat is tightening\", nonproductive cough and worsening shortness of breath.  Patient denies difficulty getting air in and out.  Not short of breath in conversation via telephone with me.  Reviewed the role that palliative care is to assist with goals of care discussion and symptom management.  Patient verbalized understanding.    Reviewed role of primary care clinic with patient and instructed her to call primary care with her concerns of increasing shortness of breath, and cough and \"throat tightening\".  Patient verbalized an understanding.    Instructed her to call 911 should symptoms worsen and she not be able to breathe well.  Patient verbalized understanding.  "

## 2021-09-10 NOTE — TELEPHONE ENCOUNTER
"Reason for Call:  Other     Detailed comments: Patient called and stated that she has COPD and is having a little chest tightness. TC advised patient that I needed to transfer her to Nurse triage and patient declined. Patient states the happens a lot because she has COPD. TC stated it is my job to transfer her to a nurse triage but patient states she will hang up and call back and figure something else to say other than that she is having \"chest tightness\". Patient states she just wants to talk to Dr Montanez as he knows what is going on with her. TC advised Dr Montanez was not in the office today. Patient requested me to send a message and request a Rx for Prednisone for her. Please call patient back to discuss her questions/concerns.     Phone Number Patient can be reached at: Home number on file [phone] (home)    Best Time: ANY    Can we leave a detailed message on this number? YES    Call taken on 9/10/2021 at 10:52 AM by Anita White"

## 2021-09-11 ENCOUNTER — HEALTH MAINTENANCE LETTER (OUTPATIENT)
Age: 76
End: 2021-09-11

## 2021-09-13 NOTE — TELEPHONE ENCOUNTER
Called pt. She has had a cough for about a week. Pt requested an appt with Cesar or Dr Montanez this week but there are no openings this week. Pt refuses to see anyone else. Please advise if you have any other suggestions for pt. Thanks.

## 2021-09-14 RX ORDER — PREDNISONE 20 MG/1
40 TABLET ORAL DAILY
Qty: 10 TABLET | Refills: 0 | Status: SHIPPED | OUTPATIENT
Start: 2021-09-14 | End: 2021-09-19

## 2021-09-14 RX ORDER — DOXYCYCLINE 100 MG/1
100 CAPSULE ORAL 2 TIMES DAILY
Qty: 20 CAPSULE | Refills: 0 | Status: SHIPPED | OUTPATIENT
Start: 2021-09-14 | End: 2021-09-24

## 2021-09-14 NOTE — TELEPHONE ENCOUNTER
Patient can proceed with treatment of 5 days prednisone 40 mg daily and doxycycline 100 mg twice daily for 10 days, which has been her treatment for bronchitis flares previously.  But if patient continues to have increasing shortness of breath or worsening symptoms, she should go to emergency room or urgent care.

## 2021-09-15 ENCOUNTER — TELEPHONE (OUTPATIENT)
Dept: INTERNAL MEDICINE | Facility: CLINIC | Age: 76
End: 2021-09-15

## 2021-09-15 DIAGNOSIS — I10 ESSENTIAL HYPERTENSION: Primary | ICD-10-CM

## 2021-09-15 RX ORDER — METOPROLOL SUCCINATE 50 MG/1
75 TABLET, EXTENDED RELEASE ORAL AT BEDTIME
Qty: 135 TABLET | Refills: 3 | Status: ON HOLD | OUTPATIENT
Start: 2021-09-15 | End: 2022-04-22

## 2021-09-15 NOTE — TELEPHONE ENCOUNTER
Reason for Call:  Medication or medication refill: metoprolol succinate (TOPROL-XL) 50 MG 24 hr tablet    Do you use a [facility]? NO Name of the pharmacy and phone number for the current request:      [facility] [address]  in [place], [phone]    Name of the medication requested:   metoprolol succinate (TOPROL-XL) 50 MG 24 hr tablet   3/31/2021  No   Sig - Route: Take 75 mg by mouth At Bedtime  - Oral     Can we leave a detailed message on this number? YES    Phone number patient can be reached at: Home number on file [phone] (home)    Best Time: any    Call taken on 9/15/2021 at 3:12 PM by Anita White

## 2021-09-20 DIAGNOSIS — G89.4 CHRONIC PAIN SYNDROME: ICD-10-CM

## 2021-09-20 RX ORDER — TRAMADOL HYDROCHLORIDE 50 MG/1
50 TABLET ORAL 2 TIMES DAILY PRN
Qty: 60 TABLET | Refills: 0 | Status: SHIPPED | OUTPATIENT
Start: 2021-09-20 | End: 2021-10-25

## 2021-09-20 NOTE — TELEPHONE ENCOUNTER
Received telephone call from patient requesting refill of tramadol.     Last refill: 8/18/21  Last office visit: 8/5/21  Scheduled for follow up pending, due for visit 10/2021.     Will route request to MD for review.     Reviewed [place]  Report.

## 2021-09-23 DIAGNOSIS — E78.00 HYPERCHOLESTEROLEMIA: ICD-10-CM

## 2021-09-23 RX ORDER — SIMVASTATIN 40 MG
40 TABLET ORAL AT BEDTIME
Qty: 90 TABLET | Refills: 3 | Status: SHIPPED | OUTPATIENT
Start: 2021-09-23 | End: 2023-01-01

## 2021-09-23 NOTE — TELEPHONE ENCOUNTER
Refill Request  Medication name: Pending Prescriptions:                       Disp   Refills    simvastatin (ZOCOR) 40 MG tablet          90 tab*3            Sig: Take 1 tablet (40 mg) by mouth At Bedtime    Who prescribed the medication: Tarik  Last refill on medication: unknown  [facility]ed [facility]  Last appointment with PCP: 07/29/21  Next appointment: Appointment scheduled for 11/08/21

## 2021-10-14 ENCOUNTER — VIRTUAL VISIT (OUTPATIENT)
Dept: PULMONOLOGY | Facility: OTHER | Age: 76
End: 2021-10-14
Payer: MEDICARE

## 2021-10-14 ENCOUNTER — TELEPHONE (OUTPATIENT)
Dept: INTERNAL MEDICINE | Facility: CLINIC | Age: 76
End: 2021-10-14

## 2021-10-14 DIAGNOSIS — J44.1 COPD EXACERBATION (H): Primary | ICD-10-CM

## 2021-10-14 DIAGNOSIS — R06.02 SOB (SHORTNESS OF BREATH): ICD-10-CM

## 2021-10-14 DIAGNOSIS — Z51.5 ENCOUNTER FOR PALLIATIVE CARE: ICD-10-CM

## 2021-10-14 DIAGNOSIS — R63.0 ANOREXIA: ICD-10-CM

## 2021-10-14 DIAGNOSIS — R05.9 COUGH: ICD-10-CM

## 2021-10-14 DIAGNOSIS — J43.9 PULMONARY EMPHYSEMA, UNSPECIFIED EMPHYSEMA TYPE (H): Primary | ICD-10-CM

## 2021-10-14 PROCEDURE — 99215 OFFICE O/P EST HI 40 MIN: CPT | Mod: 95 | Performed by: CLINICAL NURSE SPECIALIST

## 2021-10-14 RX ORDER — PREDNISONE 20 MG/1
40 TABLET ORAL DAILY
Qty: 10 TABLET | Refills: 0 | Status: SHIPPED | OUTPATIENT
Start: 2021-10-14 | End: 2021-10-21

## 2021-10-14 RX ORDER — DOXYCYCLINE 100 MG/1
100 CAPSULE ORAL 2 TIMES DAILY
Qty: 20 CAPSULE | Refills: 0 | Status: SHIPPED | OUTPATIENT
Start: 2021-10-14 | End: 2021-10-21

## 2021-10-14 RX ORDER — BENZONATATE 100 MG/1
100 CAPSULE ORAL 3 TIMES DAILY PRN
Qty: 90 CAPSULE | Refills: 1 | Status: SHIPPED | OUTPATIENT
Start: 2021-10-14 | End: 2022-01-13

## 2021-10-14 NOTE — TELEPHONE ENCOUNTER
Reason for Call:  Other cough from copd    Detailed comments: Patient states she is having an uncontrollable cough (flare up on her copd. Patient states she can't get the phlem all the way up when she coughs. Patient is wondering what she can do or if there is something PCP can prescribe for her? Patient states she doesn't know what else to do for this cough.    Phone Number Patient can be reached at: Home number on file [phone] (home)    Best Time: any    Can we leave a detailed message on this number? YES    Call taken on 10/14/2021 at 10:34 AM by Anita White

## 2021-10-14 NOTE — PROGRESS NOTES
[facility]  Palliative Care Daily Progress Note       Recommendations & Counseling     1. Shortness of breath secondary to severe COPD and chronic CHF  -Continue oxygen as at home.  - Continue inhalers as at home.  - Continue close follow up with pulmonology and cardiology.     2. Chronic pain syndrome with chronic back pain from T12 compression fracture  3/9/2021  and QTc 516 on EKG.  - Patient sees orthopedics for injections. Planning for MRI of spine per Ortho.  - Continue Gabapentin 600 mg by mouth twice daily.  - ContinueTramadol 50 mg by mouth twice daily as needed for pain.   Spent discussing benefit and burden and risk of sedation and respiratory depression with her kidney disease (creatinine greater than 2) and underlying COPD.  Reviewing sedating effects of Gabapentin combined with Tramadol. Patient verbalizes understanding and has tolerated this medication in the past.  - Continue Salonpas and Voltaren.  - Previously discussed role of pain clinic as an excellent resource for multimodal pain control and approach and referral to pain clinic ordered at last visit. Patient will consider.     3. Anorexia and weight loss (30 pound weight loss since 3/2021)  - Continue to monitor.  - Patient watch ing diet and intake. Discussed trying some shake supplements.     4.  Cough-productive cough with white phlegm.  No fever noted.  Cough is been going on for the last 2 to 3 weeks and patient feels that it is worsened over the last week.  Has previously had success with Tessalon Perles and Mucinex tablets.  -Tessalon Perles 100 milligrams by mouth 3 times a day as needed for cough prescribed to patient today  -Encourage patient to call primary care, [facility], to schedule a visit to discuss possible COPD exacerbation.  Patient previously treated with doxycycline for 10 days and 5-day course of steroids in August with good relief when this happened previously.  -Writer will in  mami Darling in epic regarding worsening cough/patient complaint.    ADVANCED CARE PLANNING AND GOALS OF CARE DISCUSSION  - Palliative Care Encounter - Please see discussion below.  - Follow up with Palliative care in 3 months.  - Goals are restorative.  - Code Status: FULL CODE.  - DTR, Leelee Evans, her decision maker.  -Will mail patient blank copy of [employer] document.  -Patient has number and knows to call [phone] with questions or needs.      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      Today, the patient was seen for:  Cough, shortness of breath and back pain    Prognosis, Goals, or Advance Care Planning was addressed today with: Yes.  Mood, coping, and/or meaning in the context of serious illness were addressed today: Yes.  Summary/Comments: Patient in good spirits and feels that overall she is doing well despite this cough that she has had for 3 or 4 weeks.  States that she and her daughter are working on a healthcare directive.    Patient is very clear that she wishes for all restorative and aggressive care including full CODE STATUS.  She has grandchildren to enjoy in live for.            Interval History:     Key Palliative Symptoms:  # Pain severity the last 12 hours: low  # Dyspnea severity the last 12 hours: low  # Nausea severity the last 12 hours: none  # Anxiety severity the last 12 hours: none    Patient is on opioids: assessed and bowels ok/no needed changes to plan of care today.           Review of Systems:     Besides above, an additional ROS was reviewed and is unremarkable           Medications:     Noted meds:  reviewed           Physical Exam:   Vital Signs: Vital signs not taken for this video visit.  GENERAL: Well groomed, no distress, alert  SKIN: Warm and dry   HEENT: Normocephalic, anicteric sclera, moist mucous membranes  LUNGS:  non-labored sitting in recliner chair without oxygen in place and speaking.  EXTREMITIES: Moves upper extremities equally  NEUROLOGIC: Alert, oriented to person, place, situation and year  PSYCH: Calm, conversant, appropriate             Data Reviewed:     Reviewed recent labs:   Lab Results   Component Value Date    WBC 10.0 06/02/2021    HGB 13.3 06/02/2021    HCT 41.7 06/02/2021     06/02/2021    ALT 12 07/29/2021    AST 23 07/29/2021     07/29/2021    BUN 41 (H) 07/29/2021    CO2 24 07/29/2021    INR 1.16 (H) 02/20/2021      Total time spent in this encounter 40 minutes with greater than 50% face-to-face conducting video visit with patient and assessing symptoms and discussing goals of care, remainder of time conducting chart review and communicating/coordinating care with medical providers.

## 2021-10-14 NOTE — PROGRESS NOTES
Stephanie is a 76 year old who is being evaluated via a billable video visit.      How would you like to obtain your AVS? MyChart  If the video visit is dropped, the invitation should be resent by: Text to cell phone: [phone]  Will anyone else be joining your video visit? No

## 2021-10-14 NOTE — TELEPHONE ENCOUNTER
Patient had previously been treated with doxycycline for 10 days and 5 days of prednisone 40 mg, she can retreat with that at this time.  But tell patient if she is not getting better on that she needs to seek medical attention right away

## 2021-10-21 RX ORDER — PREDNISONE 20 MG/1
TABLET ORAL
Qty: 10 TABLET | Refills: 0 | Status: SHIPPED | OUTPATIENT
Start: 2021-10-21 | End: 2021-11-19

## 2021-10-21 RX ORDER — DOXYCYCLINE 100 MG/1
100 CAPSULE ORAL 2 TIMES DAILY
Qty: 20 CAPSULE | Refills: 0 | Status: SHIPPED | OUTPATIENT
Start: 2021-10-21 | End: 2021-11-19

## 2021-10-21 NOTE — TELEPHONE ENCOUNTER
Pt informed and said that she is not able to go to the ER.  It is a lot.  All she wants is a refill on the antib. And prednisone to get her through until she can see you.

## 2021-10-21 NOTE — TELEPHONE ENCOUNTER
Patient should have follow-up in clinic.  Arrange follow-up with available provider.  Otherwise patient should be seen in urgent care.

## 2021-10-21 NOTE — TELEPHONE ENCOUNTER
Informed pt of advice, but she said she is too sick to even get dressed.  She would like to have Dr. Montanez call her.

## 2021-10-21 NOTE — TELEPHONE ENCOUNTER
I do not have an available appointment for a telephone visit.  If the patient is too ill to get dressed, it does sound like she needs to go to the emergency room.  Have her call 911 and go to the emergency room.

## 2021-10-21 NOTE — TELEPHONE ENCOUNTER
If she refuses to be evaluated, I did send in a doxycycline and prednisone prescription to [facility], but because she has been on prednisone for over 5 days she will need to use a taper, see new directions on her prednisone prescription to wean down on the dose.

## 2021-10-21 NOTE — TELEPHONE ENCOUNTER
10-21-21  Pt called back stated shes still not feeling better and is hoping Dr Montanez can call in another round of :  doxycycline for 10 days   merry

## 2021-10-25 DIAGNOSIS — G89.4 CHRONIC PAIN SYNDROME: ICD-10-CM

## 2021-10-25 RX ORDER — TRAMADOL HYDROCHLORIDE 50 MG/1
50 TABLET ORAL 2 TIMES DAILY PRN
Qty: 60 TABLET | Refills: 0 | Status: SHIPPED | OUTPATIENT
Start: 2021-10-25 | End: 2022-01-13

## 2021-10-25 NOTE — TELEPHONE ENCOUNTER
Received telephone call from patient requesting refill of tramadol.     Last refill: 9/20/21  Last office visit: 10/14/21  Scheduled for follow up pending (due for a visit 1/2022)     Will route request to MD for review.     Reviewed [place]  Report.

## 2021-11-01 DIAGNOSIS — R32 URINARY INCONTINENCE, UNSPECIFIED TYPE: ICD-10-CM

## 2021-11-01 DIAGNOSIS — N32.81 OVERACTIVE BLADDER: ICD-10-CM

## 2021-11-01 RX ORDER — MIRABEGRON 50 MG/1
TABLET, EXTENDED RELEASE ORAL
Qty: 90 TABLET | Refills: 3 | Status: SHIPPED | OUTPATIENT
Start: 2021-11-01 | End: 2022-01-21

## 2021-11-01 RX ORDER — SOLIFENACIN SUCCINATE 5 MG/1
5 TABLET, FILM COATED ORAL AT BEDTIME
Qty: 90 TABLET | Refills: 3 | Status: SHIPPED | OUTPATIENT
Start: 2021-11-01 | End: 2022-01-21

## 2021-11-01 NOTE — TELEPHONE ENCOUNTER
..Reason for Call:  Medication or medication refill:    Do you use a [facility]?  Name of the pharmacy and phone number for the current request: [facility] - [address]  [phone]    Name of the medication requested:   1. MYRBETRIQ 50 mg Tb24 ER tablet  2. solifenacin (VESICARE) 5 MG tablet    Other request: NONE     Can we leave a detailed message on this number? YES    Phone number patient can be reached at: Home number on file [phone] (home)    Best Time: any    Call taken on 11/1/2021 at 10:25 AM by CAYLA Julien

## 2021-11-10 ENCOUNTER — OFFICE VISIT (OUTPATIENT)
Dept: FAMILY MEDICINE | Facility: CLINIC | Age: 76
End: 2021-11-10
Payer: MEDICARE

## 2021-11-10 ENCOUNTER — TELEPHONE (OUTPATIENT)
Dept: INTERNAL MEDICINE | Facility: CLINIC | Age: 76
End: 2021-11-10

## 2021-11-10 VITALS
HEIGHT: 60 IN | SYSTOLIC BLOOD PRESSURE: 118 MMHG | WEIGHT: 121 LBS | HEART RATE: 76 BPM | OXYGEN SATURATION: 88 % | BODY MASS INDEX: 23.75 KG/M2 | DIASTOLIC BLOOD PRESSURE: 62 MMHG

## 2021-11-10 DIAGNOSIS — I42.8 NONISCHEMIC CARDIOMYOPATHY (H): Primary | ICD-10-CM

## 2021-11-10 DIAGNOSIS — R94.31 QT PROLONGATION: ICD-10-CM

## 2021-11-10 DIAGNOSIS — J44.1 COPD EXACERBATION (H): ICD-10-CM

## 2021-11-10 LAB
ATRIAL RATE - MUSE: 77 BPM
DIASTOLIC BLOOD PRESSURE - MUSE: NORMAL MMHG
INTERPRETATION ECG - MUSE: NORMAL
P AXIS - MUSE: 42 DEGREES
PR INTERVAL - MUSE: 122 MS
QRS DURATION - MUSE: 122 MS
QT - MUSE: 436 MS
QTC - MUSE: 493 MS
R AXIS - MUSE: -71 DEGREES
SYSTOLIC BLOOD PRESSURE - MUSE: NORMAL MMHG
T AXIS - MUSE: 118 DEGREES
VENTRICULAR RATE- MUSE: 77 BPM

## 2021-11-10 PROCEDURE — 99214 OFFICE O/P EST MOD 30 MIN: CPT | Performed by: NURSE PRACTITIONER

## 2021-11-10 PROCEDURE — 93005 ELECTROCARDIOGRAM TRACING: CPT | Performed by: NURSE PRACTITIONER

## 2021-11-10 PROCEDURE — 93010 ELECTROCARDIOGRAM REPORT: CPT | Performed by: INTERNAL MEDICINE

## 2021-11-10 RX ORDER — PREDNISONE 10 MG/1
10 TABLET ORAL DAILY
Qty: 30 TABLET | Refills: 0 | Status: SHIPPED | OUTPATIENT
Start: 2021-11-10 | End: 2021-11-11

## 2021-11-10 RX ORDER — DOXYCYCLINE HYCLATE 100 MG
100 TABLET ORAL 2 TIMES DAILY
Qty: 20 TABLET | Refills: 0 | Status: SHIPPED | OUTPATIENT
Start: 2021-11-10 | End: 2021-11-11

## 2021-11-10 ASSESSMENT — MIFFLIN-ST. JEOR: SCORE: 960.35

## 2021-11-10 NOTE — PROGRESS NOTES
Assessment & Plan     1. Nonischemic cardiomyopathy (H)  She has had some increased swelling in her lower extremities over the last couple of days.  I think it is reasonable for her to take an extra dose of her furosemide tomorrow morning.  She will continue monitoring her weights and follow-up with me next week  - EKG 12-lead, tracing only    2. COPD exacerbation (H)  She needs to contact her pulmonologist and discuss these recent COPD exacerbations.  I offered her CT scan or chest x-ray today but she wants to hold off until next week after starting her prednisone taper  - predniSONE (DELTASONE) 10 MG tablet; Take 1 tablet (10 mg) by mouth daily  Dispense: 30 tablet; Refill: 0  - doxycycline hyclate (VIBRA-TABS) 100 MG tablet; Take 1 tablet (100 mg) by mouth 2 times daily  Dispense: 20 tablet; Refill: 0    3. QT prolongation  EKG today personally interpreted by me revealed shorter QTC duration after discontinuance of her daily azithromycin treatment    Patient Instructions   Please call your pulmonologist and see about getting evaluated sooner for your uncontrolled COPD.    In the meantime, we are going to do a prolonged prednisone taper.  40 mg daily for 3 days, 30 mg daily x3 days, 20 mg daily x3 days, 10 mg daily x3 days.    Doxycycline 100 mg twice daily for 10 days.    Follow-up with me in 1 week to recheck your breathing and discuss lung imaging at that appointment.    Your QTC has improved since discontinuing the daily azithromycin.        Return in about 1 week (around 11/17/2021) for Follow up.    Aakash Flores, [facility]    Erik Brown is a 76 year old who presents for the following health issues     HPI     Patient comes the clinic today for follow-up.    She suffers from severe COPD.  On chronic oxygen therapy.  Follows with pulmonology at [facility] but does not have an appointment until January.    Has been on multiple rounds of prednisone and doxycycline  due to recurrent COPD exacerbations.    Today says that she continues to feel short of breath with some tightness in her chest.    She is on Xarelto due to history of factor V Leiden and DVT.    She has not had any fevers or felt ill.    Has noticed some increased swelling in her lower extremities recently but weight has been relatively stable at home.    Has been on daily azithromycin treatment in the past but that was discontinued due to prolonged QTc interval.    On chronic tramadol due to chronic pain.  Rare use      Review of Systems   Constitutional, HEENT, cardiovascular, pulmonary, gi and gu systems are negative, except as otherwise noted.      Objective    /62   Pulse 76   Ht 1.524 m (5')   Wt 54.9 kg (121 lb)   SpO2 (!) 88%   BMI 23.63 kg/m    Body mass index is 23.63 kg/m .  Physical Exam     Patient in wheelchair.  Appears in mild respiratory distress but able to complete full sentences.  +1-2 pitting edema bilateral lower extremities, right worse than left

## 2021-11-10 NOTE — PATIENT INSTRUCTIONS
Please call your pulmonologist and see about getting evaluated sooner for your uncontrolled COPD.    In the meantime, we are going to do a prolonged prednisone taper.  40 mg daily for 3 days, 30 mg daily x3 days, 20 mg daily x3 days, 10 mg daily x3 days.    Doxycycline 100 mg twice daily for 10 days.    Follow-up with me in 1 week to recheck your breathing and discuss lung imaging at that appointment.    Your QTC has improved since discontinuing the daily azithromycin.

## 2021-11-11 ENCOUNTER — TELEPHONE (OUTPATIENT)
Dept: INTERNAL MEDICINE | Facility: CLINIC | Age: 76
End: 2021-11-11
Payer: MEDICARE

## 2021-11-11 DIAGNOSIS — J44.1 COPD EXACERBATION (H): ICD-10-CM

## 2021-11-11 RX ORDER — PREDNISONE 10 MG/1
TABLET ORAL
Qty: 30 TABLET | Refills: 0 | Status: SHIPPED | OUTPATIENT
Start: 2021-11-11 | End: 2022-02-14

## 2021-11-11 RX ORDER — DOXYCYCLINE HYCLATE 100 MG
100 TABLET ORAL 2 TIMES DAILY
Qty: 20 TABLET | Refills: 0 | Status: SHIPPED | OUTPATIENT
Start: 2021-11-11 | End: 2022-02-14

## 2021-11-11 NOTE — TELEPHONE ENCOUNTER
Reason for Call:  Other prescription    Detailed comments: Patient called and stated [facility] but she wants them cancelled and re-sent to [facility] [address] [place] [address]  @ [facility] in [place], [phone].     doxycycline hyclate (VIBRA-TABS) 100 MG tablet & predniSONE (DELTASONE) 10 MG tablet    Phone Number Patient can be reached at: Home number on file [phone] (home)    Best Time: ANY    Can we leave a detailed message on this number? YES    Call taken on 11/11/2021 at 12:07 PM by Anita White

## 2021-11-15 ENCOUNTER — TRANSFERRED RECORDS (OUTPATIENT)
Dept: HEALTH INFORMATION MANAGEMENT | Facility: CLINIC | Age: 76
End: 2021-11-15
Payer: MEDICARE

## 2021-11-15 ENCOUNTER — TELEPHONE (OUTPATIENT)
Dept: CARDIOLOGY | Facility: CLINIC | Age: 76
End: 2021-11-15
Payer: MEDICARE

## 2021-11-15 NOTE — TELEPHONE ENCOUNTER
Return call to patient - informed her of Dr. Chilel's response/recommendations - patient verbalized understanding that she needs to consult her PCP for recommendations regarding holding Xarelto for injections.  mg

## 2021-11-15 NOTE — TELEPHONE ENCOUNTER
----- Message from Becca Trujillo sent at 11/15/2021 11:40 AM CST -----  Regarding: [facility]  General phone call:    Caller: DOMINIQUE  Primary cardiologist: [facility]   Detailed reason for call: SHE IS SUPPOSED TO HAVE AN EPIDURAL FOR HER OSTEOARTHRITIS AND THEY REQUIRE O COME OFF THE BLOOD THINNER - DR STALEY  Best phone number: [phone]  Best time to contact: ANY  Ok to leave a detailedmessage? YES  Device? NO    Additional Info:

## 2021-11-15 NOTE — TELEPHONE ENCOUNTER
Phone call to patient who reported that she needs to hold Xarelto for 5 days prior to injection and needs Dr. Chilel's approval - informed patient that she is due for follow-up with HF NP and reassured her that question would be forwarded to Dr. Chilel to address - understanding verbalized - call transf to sched.      Please address request for Xarelto hold orders x 5 days prior to epidural injection.  mg

## 2021-11-15 NOTE — TELEPHONE ENCOUNTER
The Xarelto she is taking is for factor V deficiency and DVT, not for a cardiac issue.  She will need to talk to her primary care provider regarding that.    RENAE

## 2021-11-18 ENCOUNTER — OFFICE VISIT (OUTPATIENT)
Dept: FAMILY MEDICINE | Facility: CLINIC | Age: 76
End: 2021-11-18
Payer: MEDICARE

## 2021-11-18 VITALS
BODY MASS INDEX: 23.29 KG/M2 | OXYGEN SATURATION: 90 % | SYSTOLIC BLOOD PRESSURE: 134 MMHG | HEART RATE: 79 BPM | DIASTOLIC BLOOD PRESSURE: 80 MMHG | WEIGHT: 119.25 LBS

## 2021-11-18 DIAGNOSIS — R10.13 DYSPEPSIA: ICD-10-CM

## 2021-11-18 DIAGNOSIS — J43.9 PULMONARY EMPHYSEMA, UNSPECIFIED EMPHYSEMA TYPE (H): Primary | ICD-10-CM

## 2021-11-18 DIAGNOSIS — D68.51 FACTOR 5 LEIDEN MUTATION, HETEROZYGOUS (H): ICD-10-CM

## 2021-11-18 DIAGNOSIS — I42.8 NONISCHEMIC CARDIOMYOPATHY (H): ICD-10-CM

## 2021-11-18 PROCEDURE — 99214 OFFICE O/P EST MOD 30 MIN: CPT | Performed by: NURSE PRACTITIONER

## 2021-11-18 RX ORDER — CETIRIZINE HYDROCHLORIDE 10 MG/1
10 TABLET ORAL DAILY
Qty: 90 TABLET | Refills: 1 | Status: CANCELLED | OUTPATIENT
Start: 2021-11-18

## 2021-11-18 RX ORDER — CETIRIZINE HYDROCHLORIDE 10 MG/1
10 TABLET ORAL DAILY PRN
Status: ON HOLD | COMMUNITY
End: 2023-01-01

## 2021-11-18 RX ORDER — FAMOTIDINE 20 MG/1
20 TABLET, FILM COATED ORAL 2 TIMES DAILY
Qty: 180 TABLET | Refills: 0 | Status: ON HOLD | OUTPATIENT
Start: 2021-11-18 | End: 2022-04-22

## 2021-11-18 NOTE — PATIENT INSTRUCTIONS
Start the new Trelegy inhaler once you receive it from the mail order pharmacy.  My hope is that we can get your COPD under better control on the new inhaler.    Finish out the prednisone taper and doxycycline antibiotic as instructed previously.    You have used famotidine (Pepcid) in the past for acid reflux.  The mucus sensation in the back of your throat may be relieved if we restart the famotidine.  I sent a prescription into [email or web address].  Take a 20 mg tablet twice daily.    There had been some discussion in the past about starting Xolair injectable medication for your breathing problems.  Revisit this discussion with your pulmonologist at your next appointment.    It is okay for you to discontinue the Xarelto for 3 to 5 days leading up to your epidural steroid injection.    Lets plan on having you follow-up with Dr. Darling sometime in the spring

## 2021-11-18 NOTE — PROGRESS NOTES
Assessment & Plan     Pulmonary emphysema, unspecified emphysema type (H)  Doing much better now on the prednisone taper and doxycycline.  She has been in communication with her pulmonologist and is in the process of starting a higher dose Trelegy Ellipta; she is looking for that in the mail over the next few days.  Encouraged her to revisit the Xolair conversation with her pulmonologist at her next appointment    Nonischemic cardiomyopathy (H)  Continues to have some swelling in her lower extremities although she does admit that she skipped a couple doses of her furosemide in anticipation of today's appointment.  We talked about the importance of not missing any doses of her furosemide moving forward.  Adhere to a low-salt diet    Dyspepsia/globus sensation  Thick mucus in her pharynx, feels like a globus sensation.  She will restart her Pepcid and see if this makes a considerable difference.  - famotidine (PEPCID) 20 MG tablet; Take 1 tablet (20 mg) by mouth 2 times daily    Factor V Leiden with history of DVT  She has chronic debilitating back pain and has been working with providers over at [facility] about arranging a transepidural steroid injection.  I think is reasonable for her to hold her Xarelto for 3 to 5 days in anticipation of that injection.    Patient Instructions   Start the new Trelegy inhaler once you receive it from the mail order pharmacy.  My hope is that we can get your COPD under better control on the new inhaler.    Finish out the prednisone taper and doxycycline antibiotic as instructed previously.    You have used famotidine (Pepcid) in the past for acid reflux.  The mucus sensation in the back of your throat may be relieved if we restart the famotidine.  I sent a prescription into [facility].  Take a 20 mg tablet twice daily.    There had been some discussion in the past about starting Xolair injectable medication for your breathing problems.  Revisit this discussion with your pulmonologist at  your next appointment.    It is okay for you to discontinue the Xarelto for 3 to 5 days leading up to your epidural steroid injection.    Lets plan on having you follow-up with Dr. Darling sometime in the spring      Prescription drug management  21 minutes spent on the date of the encounter doing chart review, history and exam, documentation and further activities per the note     See Patient Instructions    Return in about 6 months (around 5/18/2022) for Follow up.    Aakash Flores, [facility]   Stephanie is a 76 year old who presents for the following health issues     HPI     Patient comes the clinic today for follow-up of her presumed COPD exacerbation. I saw her about 1 week ago when she was having a tremendous difficulty with her breathing. She has been on multiple prednisone bursts as well as multiple rounds of doxycycline for her COPD.    She is on Trelegy Ellipta. Says that DuoNeb treatments do not seem to make a considerable difference for her anymore during her acute exacerbations.    She managed to call into her pulmonologist recently and had her Trelegy Ellipta increased. She is still waiting on that new inhaler from the mail order pharmacy.        Review of Systems   Constitutional, HEENT, cardiovascular, pulmonary, gi and gu systems are negative, except as otherwise noted.      Objective    /80 (BP Location: Right arm, Patient Position: Sitting, Cuff Size: Adult Regular)   Pulse 79   Wt 54.1 kg (119 lb 4 oz)   SpO2 90%   BMI 23.29 kg/m    Body mass index is 23.29 kg/m .  Physical Exam      Patient is in a wheelchair. She is in no acute distress. +1 pitting edema bilateral lower extremities

## 2021-11-19 ENCOUNTER — OFFICE VISIT (OUTPATIENT)
Dept: CARDIOLOGY | Facility: CLINIC | Age: 76
End: 2021-11-19
Payer: MEDICARE

## 2021-11-19 VITALS
DIASTOLIC BLOOD PRESSURE: 54 MMHG | HEIGHT: 59 IN | BODY MASS INDEX: 23.59 KG/M2 | HEART RATE: 82 BPM | WEIGHT: 117 LBS | RESPIRATION RATE: 16 BRPM | SYSTOLIC BLOOD PRESSURE: 110 MMHG

## 2021-11-19 DIAGNOSIS — N18.4 CHRONIC KIDNEY DISEASE, STAGE IV (SEVERE) (H): ICD-10-CM

## 2021-11-19 DIAGNOSIS — I10 BENIGN ESSENTIAL HYPERTENSION: ICD-10-CM

## 2021-11-19 DIAGNOSIS — J43.9 PULMONARY EMPHYSEMA, UNSPECIFIED EMPHYSEMA TYPE (H): ICD-10-CM

## 2021-11-19 DIAGNOSIS — I42.8 NONISCHEMIC CARDIOMYOPATHY (H): Primary | ICD-10-CM

## 2021-11-19 PROCEDURE — 99214 OFFICE O/P EST MOD 30 MIN: CPT | Performed by: NURSE PRACTITIONER

## 2021-11-19 ASSESSMENT — MIFFLIN-ST. JEOR: SCORE: 926.34

## 2021-11-19 NOTE — LETTER
11/19/2021    Pankaj Montanez MD  [address] [place] [address]    RE: Irene León       Dear Colleague,    I had the pleasure of seeing Irene León in the [facility].          Assessment/Recommendations   Assessment:    1.  Nonischemic cardiomyopathy with systolic dysfunction with normalization of LVEF 55%, NYHA class III: Compensated.  She continues to have fatigue and chronic dyspnea on exertion.  She has mild pedal edema.  Her weight is stable.  She denies orthopnea or PND.  She does not follow a low-sodium diet and eats out on a regular basis.  2.  Hypertension: Controlled.  Blood pressure 110/54  3.  COPD: She continues wearing oxygen as needed.    Plan:  1.  Continue current medications  2.  Stressed the importance of a low-sodium diet and enrolled her into [employer] meal service today  3.  Continue daily weights    Irene León will follow up with Dr. Chilel in June and in the heart failure clinic in March.     History of Present Illness/Subjective    Ms. Irene León is a 76 year old female seen at [facility] heart failure clinic today for continued follow-up. Her  accompanies her today. She follows up for nonischemic cardiomyopathy with systolic dysfunction.  Her last echocardiogram in July showed an improved ejection fraction of 55-60%.  She has a past medical history significant for COPD on oxygen, hypertension, dyslipidemia, chronic kidney disease stage IV, thoracic compression fracture.    Today, she continues to have fatigue and chronic dyspnea on exertion.  She has mild pedal edema.  She denies orthopnea, PND, chest pain.  She denies lightheadedness, shortness of breath, orthopnea, PND, palpitations, chest pain and abdominal fullness/bloating.      She is monitoring home weights which are stable.  She does not follow a low sodium diet. She and her  eat out on a regular basis.      Physical Examination  "Review of Systems   /54 (BP Location: Left arm, Patient Position: Sitting, Cuff Size: Adult Small)   Pulse 82   Resp 16   Ht 1.499 m (4' 11\")   Wt 53.1 kg (117 lb)   BMI 23.63 kg/m    Body mass index is 23.63 kg/m .  Wt Readings from Last 3 Encounters:   11/19/21 53.1 kg (117 lb)   11/18/21 54.1 kg (119 lb 4 oz)   11/10/21 54.9 kg (121 lb)       General Appearance:   no acute distress   ENT/Mouth: membranes moist, no oral lesions or bleeding gums.      EYES:  no scleral icterus, normal conjunctivae   Neck: no carotid bruits or thyromegaly   Chest/Lungs:   lungs are decreased to auscultation, no rales or wheezing, equal chest wall expansion    Cardiovascular:   Regular. Normal first and second heart sounds with no murmurs, rubs, or gallops; Jugular venous pressure normal, mild pedal edema bilaterally    Abdomen:  no organomegaly, masses, bruits, or tenderness; bowel sounds are present   Extremities: no cyanosis or clubbing   Skin: no xanthelasma, warm.    Neurologic: normal  bilateral, no tremors     Psychiatric: alert and oriented x3    Enc Vitals  BP: 110/54  Pulse: 82  Resp: 16  Weight: 53.1 kg (117 lb) (With Shoes)  Height: 149.9 cm (4' 11\")                                         Medical History  Surgical History Family History Social History   Past Medical History:   Diagnosis Date     ANATOLIY (acute kidney injury) (H)      Allergic rhinitis      Arrhythmia      Atrial fibrillation with RVR (H)      Bacteremia      Breast cancer (H) 2017     Cardiomyopathy (H)      Centrilobular emphysema (H)      CHF (congestive heart failure) (H)      CKD (chronic kidney disease)      COPD (chronic obstructive pulmonary disease) (H)      Coronary artery disease      Depression      Dysphagia      E. coli sepsis (H)      Factor 5 Leiden mutation, heterozygous (H)      GERD (gastroesophageal reflux disease)      Hx of radiation therapy 2017     Hyperlipidemia      Hypertension      Hypokalemia      Hypomagnesemia  "     Idiopathic cardiomyopathy (H)      Left hip pain 2014     OAB (overactive bladder)      Osteoporosis      Sacral insufficiency fracture      Sinusitis      Syncope      Urge incontinence      Vocal cord dysfunction     Past Surgical History:   Procedure Laterality Date     ARTHROPLASTY REVISION HIP Left      BIOPSY BREAST Right 2017     BLADDER SURGERY      bladder interstim with removal     C OPEN TX FEMORAL FRACTURE DISTAL MED/LAT CONDYLE Left 10/28/2015    Procedure: OPEN REDUCTION INTERNAL FIXATION LEFT  PROXIMAL FEMUR PERIPROSTHETIC FRACTURE;  Surgeon: Yovanny Albarran MD;  Location: [facility] Main OR;  Service: Orthopedics     CARDIAC CATHETERIZATION       CATARACT EXTRACTION Left 2017     IR ABDOMINAL AORTOGRAM  2003     IR AORTIC ARCH 4 VESSEL ANGIOGRAM  2003     IR MISCELLANEOUS PROCEDURE  2003     LUMPECTOMY BREAST Left 06/2017    x2    Family History   Problem Relation Age of Onset     Osteoporosis Other      Prostate Cancer Brother      Breast Cancer Maternal Aunt         age thought to be in her 70's-80's     Prostate Cancer Maternal Uncle     Social History     Socioeconomic History     Marital status:      Spouse name: Not on file     Number of children: Not on file     Years of education: Not on file     Highest education level: Not on file   Occupational History     Not on file   Tobacco Use     Smoking status: Former Smoker     Quit date: 10/28/2007     Years since quittin.0     Smokeless tobacco: Former User     Quit date: 2004   Substance and Sexual Activity     Alcohol use: No     Drug use: No     Sexual activity: Not on file   Other Topics Concern     Not on file   Social History Narrative     and lives in home with 3 flight of steps     Social Determinants of Health     Financial Resource Strain: Not on file   Food Insecurity: Not on file   Transportation Needs: Not on file   Physical Activity: Not on file   Stress: Not on file   Social  Connections: Not on file   Intimate Partner Violence: Not on file   Housing Stability: Not on file          Medications  Allergies   Current Outpatient Medications   Medication Sig Dispense Refill     acetaminophen (TYLENOL) 500 MG tablet [ACETAMINOPHEN (TYLENOL) 500 MG TABLET] Take 1,000 mg by mouth 3 (three) times a day.       albuterol (PROAIR HFA/PROVENTIL HFA/VENTOLIN HFA) 108 (90 Base) MCG/ACT inhaler Inhale 2 puffs into the lungs every 4 hours as needed for shortness of breath / dyspnea 17 g 1     ascorbic acid, vitamin C, (VITAMIN C) 1000 MG tablet [ASCORBIC ACID, VITAMIN C, (VITAMIN C) 1000 MG TABLET] Take 1,000 mg by mouth daily. Airborne 1 tab daily       ATROVENT HFA 17 mcg/actuation inhaler [ATROVENT HFA 17 MCG/ACTUATION INHALER] USE 2 INHALATIONS EVERY 6 HOURS 1 Inhaler 3     azelastine (ASTELIN) 137 mcg (0.1 %) nasal spray [AZELASTINE (ASTELIN) 137 MCG (0.1 %) NASAL SPRAY] 2 sprays into each nostril daily.       benzonatate (TESSALON) 100 MG capsule Take 1 capsule (100 mg) by mouth 3 times daily as needed for cough 90 capsule 1     cetirizine (ZYRTEC) 10 MG tablet Take 10 mg by mouth daily       chlorhexidine (PERIDEX) 0.12 % solution [CHLORHEXIDINE (PERIDEX) 0.12 % SOLUTION] Apply 15 mL to the mouth or throat at bedtime as needed.        cholecalciferol, vitamin D3, 1,000 unit tablet [CHOLECALCIFEROL, VITAMIN D3, 1,000 UNIT TABLET] Take 1,000 Units by mouth daily.       doxycycline hyclate (VIBRA-TABS) 100 MG tablet Take 1 tablet (100 mg) by mouth 2 times daily 20 tablet 0     EPINEPHrine (EPIPEN/ADRENACLICK/AUVI-Q) 0.3 mg/0.3 mL injection Inject 0.3 mg into the muscle        fluticasone-umeclidinium-vilanterol (TRELEGY ELLIPTA) 100-62.5-25 mcg DsDv inhaler [FLUTICASONE-UMECLIDINIUM-VILANTEROL (TRELEGY ELLIPTA) 100-62.5-25 MCG DSDV INHALER] Inhale 1 Inhalation daily. 1 each 3     furosemide (LASIX) 40 MG tablet Take 1 tablet (40 mg) by mouth 2 times daily 180 tablet 1     gabapentin (NEURONTIN) 300  MG capsule Take 2 capsules (600 mg) by mouth 2 times daily 360 capsule 2     ipratropium-albuteroL (DUO-NEB) 0.5-2.5 mg/3 mL nebulizer [IPRATROPIUM-ALBUTEROL (DUO-NEB) 0.5-2.5 MG/3 ML NEBULIZER] Inhale 3 mL 4 (four) times a day. 120 vial 1     loperamide (IMODIUM) 2 mg capsule [LOPERAMIDE (IMODIUM) 2 MG CAPSULE] Take 4 mg by mouth as needed for diarrhea (Every 3 hours as needed for Diarrhea).       magnesium chloride (SLOW-MAG) 64 mg TbEC delayed-release tablet [MAGNESIUM CHLORIDE (SLOW-MAG) 64 MG TBEC DELAYED-RELEASE TABLET] Take 1 tablet (64 mg total) by mouth daily. 90 tablet 3     metoprolol succinate ER (TOPROL-XL) 50 MG 24 hr tablet Take 1.5 tablets (75 mg) by mouth At Bedtime 135 tablet 3     mirabegron (MYRBETRIQ) 50 MG 24 hr tablet [MYRBETRIQ 50 MG TB24 ER TABLET] TAKE 1 TABLET DAILY 90 tablet 3     potassium chloride ER (K-TAB/KLOR-CON) 10 MEQ CR tablet Take 1 tablet (10 mEq) by mouth 2 times daily 180 tablet 3     predniSONE (DELTASONE) 10 MG tablet 4 tabs x3 days, 3 tabs x3 days, 2 tabs x3 days, 1 tab for 3 days 30 tablet 0     rivaroxaban ANTICOAGULANT (XARELTO) 15 mg tablet [RIVAROXABAN ANTICOAGULANT (XARELTO) 15 MG TABLET] Take 15 mg by mouth daily with supper.       simvastatin (ZOCOR) 40 MG tablet Take 1 tablet (40 mg) by mouth At Bedtime 90 tablet 3     solifenacin (VESICARE) 5 MG tablet Take 1 tablet (5 mg) by mouth At Bedtime 90 tablet 3     tamoxifen (NOLVADEX) 20 MG tablet [TAMOXIFEN (NOLVADEX) 20 MG TABLET] TAKE 1 TABLET DAILY 90 tablet 3     traMADol (ULTRAM) 50 MG tablet Take 1 tablet (50 mg) by mouth 2 times daily as needed for severe pain 60 tablet 0     famotidine (PEPCID) 20 MG tablet Take 1 tablet (20 mg) by mouth 2 times daily (Patient not taking: Reported on 11/19/2021) 180 tablet 0    Allergies   Allergen Reactions     Sulfa (Sulfonamide Antibiotics) [Sulfa Drugs] Rash     Headaches and dizziness.     Homeopathic Drugs [External Allergen Needs Reconciliation - See Comment] Unknown  and Other (See Comments)     Comment: runny nose, Comment: runny nose     Mold Extracts [Molds & Smuts] Unknown     Morphine (Pf) [Morphine] Unknown     hallucinate     Lisinopril Cough, Unknown and Itching     Comment: cough, Comment: cough     Sulfacetamide Sodium [Sulfacetamide] Rash         Lab Results    Chemistry/lipid CBC Cardiac Enzymes/BNP/TSH/INR   Lab Results   Component Value Date    BUN 41 (H) 07/29/2021     07/29/2021    CO2 24 07/29/2021    Lab Results   Component Value Date    WBC 10.0 06/02/2021    HGB 13.3 06/02/2021    HCT 41.7 06/02/2021    MCV 88 06/02/2021     06/02/2021    Lab Results   Component Value Date    TROPONINI 0.03 03/10/2021     (H) 03/09/2021    INR 1.16 (H) 02/20/2021             This note has been dictated using voice recognition software. Any grammatical, typographical, or context distortions are unintentional and inherent to the software    30 minutes spent on the date of encounter doing chart review, review of outside records, review of test results, patient visit, documentation and discussion with family.                  Thank you for allowing me to participate in the care of your patient.      Sincerely,     DELMIS Watson [facility]  cc:   No referring provider defined for this encounter.

## 2021-11-19 NOTE — PATIENT INSTRUCTIONS
Irene León,    It was a pleasure to see you today at [facility].     My recommendations after this visit include:  - Please follow up with Dr Chille June 2022   - Please follow up with Samantha Herrera in March  - Continue current medications    Samantha Herrera, CNP

## 2021-11-19 NOTE — PROGRESS NOTES
"      Assessment/Recommendations   Assessment:    1.  Nonischemic cardiomyopathy with systolic dysfunction with normalization of LVEF 55%, NYHA class III: Compensated.  She continues to have fatigue and chronic dyspnea on exertion.  She has mild pedal edema.  Her weight is stable.  She denies orthopnea or PND.  She does not follow a low-sodium diet and eats out on a regular basis.  2.  Hypertension: Controlled.  Blood pressure 110/54  3.  COPD: She continues wearing oxygen as needed.    Plan:  1.  Continue current medications  2.  Stressed the importance of a low-sodium diet and enrolled her into [employer] meal service today  3.  Continue daily weights    Irene León will follow up with Dr. Chilel in June and in the heart failure clinic in March.     History of Present Illness/Subjective    Ms. Irene León is a 76 year old female seen at [facility] heart failure clinic today for continued follow-up. Her  accompanies her today. She follows up for nonischemic cardiomyopathy with systolic dysfunction.  Her last echocardiogram in July showed an improved ejection fraction of 55-60%.  She has a past medical history significant for COPD on oxygen, hypertension, dyslipidemia, chronic kidney disease stage IV, thoracic compression fracture.    Today, she continues to have fatigue and chronic dyspnea on exertion.  She has mild pedal edema.  She denies orthopnea, PND, chest pain.  She denies lightheadedness, shortness of breath, orthopnea, PND, palpitations, chest pain and abdominal fullness/bloating.      She is monitoring home weights which are stable.  She does not follow a low sodium diet. She and her  eat out on a regular basis.      Physical Examination Review of Systems   /54 (BP Location: Left arm, Patient Position: Sitting, Cuff Size: Adult Small)   Pulse 82   Resp 16   Ht 1.499 m (4' 11\")   Wt 53.1 kg (117 lb)   BMI 23.63 kg/m    Body mass index is 23.63 kg/m .  Wt Readings from " "Last 3 Encounters:   11/19/21 53.1 kg (117 lb)   11/18/21 54.1 kg (119 lb 4 oz)   11/10/21 54.9 kg (121 lb)       General Appearance:   no acute distress   ENT/Mouth: membranes moist, no oral lesions or bleeding gums.      EYES:  no scleral icterus, normal conjunctivae   Neck: no carotid bruits or thyromegaly   Chest/Lungs:   lungs are decreased to auscultation, no rales or wheezing, equal chest wall expansion    Cardiovascular:   Regular. Normal first and second heart sounds with no murmurs, rubs, or gallops; Jugular venous pressure normal, mild pedal edema bilaterally    Abdomen:  no organomegaly, masses, bruits, or tenderness; bowel sounds are present   Extremities: no cyanosis or clubbing   Skin: no xanthelasma, warm.    Neurologic: normal  bilateral, no tremors     Psychiatric: alert and oriented x3    Enc Vitals  BP: 110/54  Pulse: 82  Resp: 16  Weight: 53.1 kg (117 lb) (With Shoes)  Height: 149.9 cm (4' 11\")                                         Medical History  Surgical History Family History Social History   Past Medical History:   Diagnosis Date     ANATOLIY (acute kidney injury) (H)      Allergic rhinitis      Arrhythmia      Atrial fibrillation with RVR (H)      Bacteremia      Breast cancer (H) 2017     Cardiomyopathy (H)      Centrilobular emphysema (H)      CHF (congestive heart failure) (H)      CKD (chronic kidney disease)      COPD (chronic obstructive pulmonary disease) (H)      Coronary artery disease      Depression      Dysphagia      E. coli sepsis (H)      Factor 5 Leiden mutation, heterozygous (H)      GERD (gastroesophageal reflux disease)      Hx of radiation therapy 2017     Hyperlipidemia      Hypertension      Hypokalemia      Hypomagnesemia      Idiopathic cardiomyopathy (H)      Left hip pain 4/30/2014     OAB (overactive bladder)      Osteoporosis      Sacral insufficiency fracture      Sinusitis      Syncope      Urge incontinence      Vocal cord dysfunction     Past Surgical " History:   Procedure Laterality Date     ARTHROPLASTY REVISION HIP Left      BIOPSY BREAST Right 2017     BLADDER SURGERY      bladder interstim with removal     C OPEN TX FEMORAL FRACTURE DISTAL MED/LAT CONDYLE Left 10/28/2015    Procedure: OPEN REDUCTION INTERNAL FIXATION LEFT  PROXIMAL FEMUR PERIPROSTHETIC FRACTURE;  Surgeon: Yovanny Albarran MD;  Location: [facility] Main OR;  Service: Orthopedics     CARDIAC CATHETERIZATION       CATARACT EXTRACTION Left 2017     IR ABDOMINAL AORTOGRAM  2003     IR AORTIC ARCH 4 VESSEL ANGIOGRAM  2003     IR MISCELLANEOUS PROCEDURE  2003     LUMPECTOMY BREAST Left 06/2017    x2    Family History   Problem Relation Age of Onset     Osteoporosis Other      Prostate Cancer Brother      Breast Cancer Maternal Aunt         age thought to be in her 70's-80's     Prostate Cancer Maternal Uncle     Social History     Socioeconomic History     Marital status:      Spouse name: Not on file     Number of children: Not on file     Years of education: Not on file     Highest education level: Not on file   Occupational History     Not on file   Tobacco Use     Smoking status: Former Smoker     Quit date: 10/28/2007     Years since quittin.0     Smokeless tobacco: Former User     Quit date: 2004   Substance and Sexual Activity     Alcohol use: No     Drug use: No     Sexual activity: Not on file   Other Topics Concern     Not on file   Social History Narrative     and lives in home with 3 flight of steps     Social Determinants of Health     Financial Resource Strain: Not on file   Food Insecurity: Not on file   Transportation Needs: Not on file   Physical Activity: Not on file   Stress: Not on file   Social Connections: Not on file   Intimate Partner Violence: Not on file   Housing Stability: Not on file          Medications  Allergies   Current Outpatient Medications   Medication Sig Dispense Refill     acetaminophen (TYLENOL) 500 MG tablet  [ACETAMINOPHEN (TYLENOL) 500 MG TABLET] Take 1,000 mg by mouth 3 (three) times a day.       albuterol (PROAIR HFA/PROVENTIL HFA/VENTOLIN HFA) 108 (90 Base) MCG/ACT inhaler Inhale 2 puffs into the lungs every 4 hours as needed for shortness of breath / dyspnea 17 g 1     ascorbic acid, vitamin C, (VITAMIN C) 1000 MG tablet [ASCORBIC ACID, VITAMIN C, (VITAMIN C) 1000 MG TABLET] Take 1,000 mg by mouth daily. Airborne 1 tab daily       ATROVENT HFA 17 mcg/actuation inhaler [ATROVENT HFA 17 MCG/ACTUATION INHALER] USE 2 INHALATIONS EVERY 6 HOURS 1 Inhaler 3     azelastine (ASTELIN) 137 mcg (0.1 %) nasal spray [AZELASTINE (ASTELIN) 137 MCG (0.1 %) NASAL SPRAY] 2 sprays into each nostril daily.       benzonatate (TESSALON) 100 MG capsule Take 1 capsule (100 mg) by mouth 3 times daily as needed for cough 90 capsule 1     cetirizine (ZYRTEC) 10 MG tablet Take 10 mg by mouth daily       chlorhexidine (PERIDEX) 0.12 % solution [CHLORHEXIDINE (PERIDEX) 0.12 % SOLUTION] Apply 15 mL to the mouth or throat at bedtime as needed.        cholecalciferol, vitamin D3, 1,000 unit tablet [CHOLECALCIFEROL, VITAMIN D3, 1,000 UNIT TABLET] Take 1,000 Units by mouth daily.       doxycycline hyclate (VIBRA-TABS) 100 MG tablet Take 1 tablet (100 mg) by mouth 2 times daily 20 tablet 0     EPINEPHrine (EPIPEN/ADRENACLICK/AUVI-Q) 0.3 mg/0.3 mL injection Inject 0.3 mg into the muscle        fluticasone-umeclidinium-vilanterol (TRELEGY ELLIPTA) 100-62.5-25 mcg DsDv inhaler [FLUTICASONE-UMECLIDINIUM-VILANTEROL (TRELEGY ELLIPTA) 100-62.5-25 MCG DSDV INHALER] Inhale 1 Inhalation daily. 1 each 3     furosemide (LASIX) 40 MG tablet Take 1 tablet (40 mg) by mouth 2 times daily 180 tablet 1     gabapentin (NEURONTIN) 300 MG capsule Take 2 capsules (600 mg) by mouth 2 times daily 360 capsule 2     ipratropium-albuteroL (DUO-NEB) 0.5-2.5 mg/3 mL nebulizer [IPRATROPIUM-ALBUTEROL (DUO-NEB) 0.5-2.5 MG/3 ML NEBULIZER] Inhale 3 mL 4 (four) times a day. 120 vial 1      loperamide (IMODIUM) 2 mg capsule [LOPERAMIDE (IMODIUM) 2 MG CAPSULE] Take 4 mg by mouth as needed for diarrhea (Every 3 hours as needed for Diarrhea).       magnesium chloride (SLOW-MAG) 64 mg TbEC delayed-release tablet [MAGNESIUM CHLORIDE (SLOW-MAG) 64 MG TBEC DELAYED-RELEASE TABLET] Take 1 tablet (64 mg total) by mouth daily. 90 tablet 3     metoprolol succinate ER (TOPROL-XL) 50 MG 24 hr tablet Take 1.5 tablets (75 mg) by mouth At Bedtime 135 tablet 3     mirabegron (MYRBETRIQ) 50 MG 24 hr tablet [MYRBETRIQ 50 MG TB24 ER TABLET] TAKE 1 TABLET DAILY 90 tablet 3     potassium chloride ER (K-TAB/KLOR-CON) 10 MEQ CR tablet Take 1 tablet (10 mEq) by mouth 2 times daily 180 tablet 3     predniSONE (DELTASONE) 10 MG tablet 4 tabs x3 days, 3 tabs x3 days, 2 tabs x3 days, 1 tab for 3 days 30 tablet 0     rivaroxaban ANTICOAGULANT (XARELTO) 15 mg tablet [RIVAROXABAN ANTICOAGULANT (XARELTO) 15 MG TABLET] Take 15 mg by mouth daily with supper.       simvastatin (ZOCOR) 40 MG tablet Take 1 tablet (40 mg) by mouth At Bedtime 90 tablet 3     solifenacin (VESICARE) 5 MG tablet Take 1 tablet (5 mg) by mouth At Bedtime 90 tablet 3     tamoxifen (NOLVADEX) 20 MG tablet [TAMOXIFEN (NOLVADEX) 20 MG TABLET] TAKE 1 TABLET DAILY 90 tablet 3     traMADol (ULTRAM) 50 MG tablet Take 1 tablet (50 mg) by mouth 2 times daily as needed for severe pain 60 tablet 0     famotidine (PEPCID) 20 MG tablet Take 1 tablet (20 mg) by mouth 2 times daily (Patient not taking: Reported on 11/19/2021) 180 tablet 0    Allergies   Allergen Reactions     Sulfa (Sulfonamide Antibiotics) [Sulfa Drugs] Rash     Headaches and dizziness.     Homeopathic Drugs [External Allergen Needs Reconciliation - See Comment] Unknown and Other (See Comments)     Comment: runny nose, Comment: runny nose     Mold Extracts [Molds & Smuts] Unknown     Morphine (Pf) [Morphine] Unknown     hallucinate     Lisinopril Cough, Unknown and Itching     Comment: cough, Comment: cough      Sulfacetamide Sodium [Sulfacetamide] Rash         Lab Results    Chemistry/lipid CBC Cardiac Enzymes/BNP/TSH/INR   Lab Results   Component Value Date    BUN 41 (H) 07/29/2021     07/29/2021    CO2 24 07/29/2021    Lab Results   Component Value Date    WBC 10.0 06/02/2021    HGB 13.3 06/02/2021    HCT 41.7 06/02/2021    MCV 88 06/02/2021     06/02/2021    Lab Results   Component Value Date    TROPONINI 0.03 03/10/2021     (H) 03/09/2021    INR 1.16 (H) 02/20/2021             This note has been dictated using voice recognition software. Any grammatical, typographical, or context distortions are unintentional and inherent to the software    30 minutes spent on the date of encounter doing chart review, review of outside records, review of test results, patient visit, documentation and discussion with family.

## 2021-12-08 ENCOUNTER — IMMUNIZATION (OUTPATIENT)
Dept: NURSING | Facility: CLINIC | Age: 76
End: 2021-12-08
Payer: MEDICARE

## 2021-12-08 PROCEDURE — 91300 PR COVID VAC PFIZER DIL RECON 30 MCG/0.3 ML IM: CPT

## 2021-12-08 PROCEDURE — 0004A PR COVID VAC PFIZER DIL RECON 30 MCG/0.3 ML IM: CPT

## 2021-12-21 ENCOUNTER — TELEPHONE (OUTPATIENT)
Dept: CARDIOLOGY | Facility: CLINIC | Age: 76
End: 2021-12-21

## 2021-12-21 NOTE — TELEPHONE ENCOUNTER
----- Message from Jameson AVALOS Christopher sent at 12/21/2021 11:55 AM CST -----  Regarding: EUGENIA pt  General phone call:    Caller: zena  Primary cardiologist: EUGENIA  Detailed reason for call: pt has questions about last visit with [facility], please advise  Best phone number: [phone]  Best time to contact: any  Ok to leave a detailedmessage? yes  Device? no    Additional Info:

## 2021-12-21 NOTE — TELEPHONE ENCOUNTER
Called patient back; she states that they have not received any call from [employer] to arrange for meal delivery. She states that [employer] did not receive her application.     Application re-faxed to [employer] with instructions to expedite meal delivery. Pt has [employer] phone number to follow up.     Alisha Connor RN

## 2021-12-23 ENCOUNTER — TELEPHONE (OUTPATIENT)
Dept: INTERNAL MEDICINE | Facility: CLINIC | Age: 76
End: 2021-12-23
Payer: MEDICARE

## 2021-12-23 NOTE — TELEPHONE ENCOUNTER
Reason for call:  Patient reporting a symptom    Symptom or request: coughing x1 week, then started vomiting x3 days and it's one time per day. Patient states she has a lot of mucus stuck in her throat.     Have you been treated for this before? No    Additional comments: wanting to know what she can do to combat her symptoms.     Phone Number patient can be reached at:  Home number on file [phone] (home)    Best Time:  Any    Can we leave a detailed message on this number:  YES    Call taken on 12/23/2021 at 3:31 PM by Shira Rodriges

## 2021-12-23 NOTE — TELEPHONE ENCOUNTER
She needs to consider that this may represent Covid.  I would recommend getting tested and scheduling a telephone/video visit with available provider.

## 2021-12-24 NOTE — TELEPHONE ENCOUNTER
I called and spoke with patient, scheduled 12/29, with PCP. She will call back to check cancellations.     I informed patient that if her symptoms get worse, she needs to present to ER, UC, WIC. Patient verbalized understanding, no further questions/concerns at this time.

## 2021-12-27 ENCOUNTER — TELEPHONE (OUTPATIENT)
Dept: INTERNAL MEDICINE | Facility: CLINIC | Age: 76
End: 2021-12-27
Payer: MEDICARE

## 2021-12-27 NOTE — TELEPHONE ENCOUNTER
Reason for Call:  Patient calling and states that she has plans to go out to lunch with her sisters and one of her sisters advised her not to go since it is in public.  Patient would like advice from PCP on going out to eat in restaurant with her sisters.  Patient states that she is fully vaccinated and will wear a mask.      Phone Number Patient can be reached at: Home number on file [phone] (home)    Best Time: anytime    Can we leave a detailed message on this number? YES    Call taken on 12/27/2021 at 12:09 PM by Marlene Silva

## 2021-12-30 DIAGNOSIS — I50.22 CHRONIC SYSTOLIC CONGESTIVE HEART FAILURE (H): ICD-10-CM

## 2021-12-30 RX ORDER — FUROSEMIDE 40 MG
40 TABLET ORAL 2 TIMES DAILY
Qty: 180 TABLET | Refills: 1 | Status: ON HOLD | OUTPATIENT
Start: 2021-12-30 | End: 2022-04-22

## 2021-12-30 NOTE — TELEPHONE ENCOUNTER
12-30-21  Reason for Call:  Medication     Do you use a [facility]     Name of the medication requested: furosemide (LASIX) 40 MG tablet    Other request: none    Can we leave a detailed message on this number? YES    Phone number patient can be reached at: Cell number on file:    Telephone Information:   Mobile [phone]       Best Time: antyhime    Call taken on 12/30/2021 at 8:54 AM by Zahra Shaikh

## 2022-01-01 ENCOUNTER — TELEPHONE (OUTPATIENT)
Dept: INTERNAL MEDICINE | Facility: CLINIC | Age: 77
End: 2022-01-01

## 2022-01-01 ENCOUNTER — MEDICAL CORRESPONDENCE (OUTPATIENT)
Dept: HEALTH INFORMATION MANAGEMENT | Facility: CLINIC | Age: 77
End: 2022-01-01

## 2022-01-01 ENCOUNTER — TELEPHONE (OUTPATIENT)
Dept: RESPIRATORY THERAPY | Facility: CLINIC | Age: 77
End: 2022-01-01

## 2022-01-01 ENCOUNTER — VIRTUAL VISIT (OUTPATIENT)
Dept: PALLIATIVE CARE | Facility: CLINIC | Age: 77
End: 2022-01-01
Attending: FAMILY MEDICINE
Payer: MEDICARE

## 2022-01-01 ENCOUNTER — TELEPHONE (OUTPATIENT)
Dept: RESPIRATORY THERAPY | Facility: HOSPITAL | Age: 77
End: 2022-01-01

## 2022-01-01 ENCOUNTER — TELEPHONE (OUTPATIENT)
Dept: PALLIATIVE CARE | Facility: CLINIC | Age: 77
End: 2022-01-01

## 2022-01-01 ENCOUNTER — VIRTUAL VISIT (OUTPATIENT)
Dept: INTERNAL MEDICINE | Facility: CLINIC | Age: 77
End: 2022-01-01
Payer: MEDICARE

## 2022-01-01 ENCOUNTER — OFFICE VISIT (OUTPATIENT)
Dept: INTERNAL MEDICINE | Facility: CLINIC | Age: 77
End: 2022-01-01
Payer: MEDICARE

## 2022-01-01 ENCOUNTER — TELEPHONE (OUTPATIENT)
Dept: FAMILY MEDICINE | Facility: CLINIC | Age: 77
End: 2022-01-01

## 2022-01-01 VITALS
HEART RATE: 60 BPM | DIASTOLIC BLOOD PRESSURE: 62 MMHG | OXYGEN SATURATION: 96 % | SYSTOLIC BLOOD PRESSURE: 122 MMHG | TEMPERATURE: 98.2 F

## 2022-01-01 DIAGNOSIS — R49.0 HOARSENESS: Primary | ICD-10-CM

## 2022-01-01 DIAGNOSIS — Z79.899 ENCOUNTER FOR LONG-TERM (CURRENT) USE OF MEDICATIONS: ICD-10-CM

## 2022-01-01 DIAGNOSIS — Z13.220 SCREENING FOR HYPERLIPIDEMIA: ICD-10-CM

## 2022-01-01 DIAGNOSIS — I50.22 CHRONIC SYSTOLIC CONGESTIVE HEART FAILURE (H): ICD-10-CM

## 2022-01-01 DIAGNOSIS — M54.42 ACUTE LEFT-SIDED LOW BACK PAIN WITH LEFT-SIDED SCIATICA: Primary | ICD-10-CM

## 2022-01-01 DIAGNOSIS — J44.1 COPD EXACERBATION (H): ICD-10-CM

## 2022-01-01 DIAGNOSIS — N18.4 CHRONIC KIDNEY DISEASE, STAGE IV (SEVERE) (H): ICD-10-CM

## 2022-01-01 DIAGNOSIS — G57.02 PYRIFORMIS SYNDROME, LEFT: ICD-10-CM

## 2022-01-01 DIAGNOSIS — M25.552 LEFT HIP PAIN: ICD-10-CM

## 2022-01-01 DIAGNOSIS — J43.9 PULMONARY EMPHYSEMA, UNSPECIFIED EMPHYSEMA TYPE (H): ICD-10-CM

## 2022-01-01 DIAGNOSIS — J44.9 CHRONIC OBSTRUCTIVE PULMONARY DISEASE, UNSPECIFIED COPD TYPE (H): Primary | ICD-10-CM

## 2022-01-01 DIAGNOSIS — R53.81 PHYSICAL DECONDITIONING: ICD-10-CM

## 2022-01-01 DIAGNOSIS — Z51.5 ENCOUNTER FOR PALLIATIVE CARE: ICD-10-CM

## 2022-01-01 DIAGNOSIS — I10 BENIGN ESSENTIAL HYPERTENSION: ICD-10-CM

## 2022-01-01 DIAGNOSIS — Z23 NEED FOR VIRAL IMMUNIZATION: Primary | ICD-10-CM

## 2022-01-01 PROCEDURE — 99215 OFFICE O/P EST HI 40 MIN: CPT | Mod: 95 | Performed by: FAMILY MEDICINE

## 2022-01-01 PROCEDURE — G0463 HOSPITAL OUTPT CLINIC VISIT: HCPCS | Mod: PN,RTG | Performed by: FAMILY MEDICINE

## 2022-01-01 PROCEDURE — 90662 IIV NO PRSV INCREASED AG IM: CPT | Performed by: NURSE PRACTITIONER

## 2022-01-01 PROCEDURE — 99213 OFFICE O/P EST LOW 20 MIN: CPT | Mod: 25 | Performed by: NURSE PRACTITIONER

## 2022-01-01 PROCEDURE — G0008 ADMIN INFLUENZA VIRUS VAC: HCPCS | Performed by: NURSE PRACTITIONER

## 2022-01-01 PROCEDURE — 0124A COVID-19,PF,PFIZER BOOSTER BIVALENT: CPT | Performed by: NURSE PRACTITIONER

## 2022-01-01 PROCEDURE — 91312 COVID-19,PF,PFIZER BOOSTER BIVALENT: CPT | Performed by: NURSE PRACTITIONER

## 2022-01-01 PROCEDURE — 99214 OFFICE O/P EST MOD 30 MIN: CPT | Mod: 95 | Performed by: NURSE PRACTITIONER

## 2022-01-01 RX ORDER — GABAPENTIN 100 MG/1
CAPSULE ORAL
Qty: 90 CAPSULE | Refills: 0 | Status: SHIPPED | OUTPATIENT
Start: 2022-01-01 | End: 2023-01-01 | Stop reason: DRUGHIGH

## 2022-01-01 RX ORDER — POTASSIUM CHLORIDE 750 MG/1
1 TABLET, EXTENDED RELEASE ORAL DAILY
Status: ON HOLD | COMMUNITY
End: 2023-01-01

## 2022-01-01 RX ORDER — TRAMADOL HYDROCHLORIDE 50 MG/1
50 TABLET ORAL 2 TIMES DAILY
Qty: 60 TABLET | Refills: 0 | Status: SHIPPED | OUTPATIENT
Start: 2022-01-01 | End: 2023-01-01

## 2022-01-01 RX ORDER — TRAMADOL HYDROCHLORIDE 50 MG/1
50 TABLET ORAL 2 TIMES DAILY
Qty: 60 TABLET | Refills: 0 | Status: SHIPPED | OUTPATIENT
Start: 2022-01-01 | End: 2022-01-01

## 2022-01-03 ENCOUNTER — VIRTUAL VISIT (OUTPATIENT)
Dept: INTERNAL MEDICINE | Facility: CLINIC | Age: 77
End: 2022-01-03
Payer: MEDICARE

## 2022-01-03 ENCOUNTER — ANCILLARY PROCEDURE (OUTPATIENT)
Dept: BONE DENSITY | Facility: CLINIC | Age: 77
End: 2022-01-03
Attending: FAMILY MEDICINE
Payer: MEDICARE

## 2022-01-03 DIAGNOSIS — S22.080A T12 COMPRESSION FRACTURE (H): ICD-10-CM

## 2022-01-03 DIAGNOSIS — R05.9 COUGH: Primary | ICD-10-CM

## 2022-01-03 DIAGNOSIS — M81.0 OSTEOPOROSIS: ICD-10-CM

## 2022-01-03 LAB — SARS-COV-2 RNA RESP QL NAA+PROBE: NORMAL

## 2022-01-03 PROCEDURE — 77081 DXA BONE DENSITY APPENDICULR: CPT | Mod: TC | Performed by: RADIOLOGY

## 2022-01-03 PROCEDURE — G2012 BRIEF CHECK IN BY MD/QHP: HCPCS | Mod: 95 | Performed by: INTERNAL MEDICINE

## 2022-01-03 PROCEDURE — U0005 INFEC AGEN DETEC AMPLI PROBE: HCPCS | Performed by: INTERNAL MEDICINE

## 2022-01-03 PROCEDURE — 77080 DXA BONE DENSITY AXIAL: CPT | Mod: TC | Performed by: RADIOLOGY

## 2022-01-03 NOTE — PROGRESS NOTES
Stephanie is a 76 year old who is being evaluated via a billable telephone visit.      What phone number would you like to be contacted at? [phone]  How would you like to obtain your AVS? MyChart    Assessment & Plan     Cough  76-year-old woman with COPD who has had persistent cough for the past 2 weeks.  Denies feeling more shortness of breath.  No chest pain.  No fevers or chills.  She is using her albuterol inhaler.  She is immunized completely against Covid but this remains a consideration.  We will arrange for Covid testing.  - Symptomatic; Unknown COVID-19 Virus (Coronavirus) by PCR Nasopharyngeal; Future  - Symptomatic; Unknown COVID-19 Virus (Coronavirus) by PCR Nasopharyngeal                 Return in about 1 week (around 1/10/2022) for Follow up.    Oracio Pandey MD  [facility]   Stephanie is a 76 year old who presents for the following health issues     HPI     Telephone visit visit completed today to address concerns of persistent cough for the last 2 weeks..  See assessment and plan for details.      Review of Systems   No fevers or chills      Objective           Vitals:  No vitals were obtained today due to virtual visit.    Physical Exam   No exam possible during telephone visit    Phone call duration: 12 minutes

## 2022-01-04 ENCOUNTER — TELEPHONE (OUTPATIENT)
Dept: INTERNAL MEDICINE | Facility: CLINIC | Age: 77
End: 2022-01-04
Payer: MEDICARE

## 2022-01-04 DIAGNOSIS — M80.00XD OSTEOPOROSIS WITH CURRENT PATHOLOGICAL FRACTURE WITH ROUTINE HEALING, UNSPECIFIED OSTEOPOROSIS TYPE, SUBSEQUENT ENCOUNTER: Primary | ICD-10-CM

## 2022-01-04 LAB — SARS-COV-2 RNA RESP QL NAA+PROBE: NOT DETECTED

## 2022-01-04 NOTE — TELEPHONE ENCOUNTER
Patient notified of clinician's message and verbalized understanding. No further questions at this time.  Patient is already scheduled with Dr. Malin.  Dottie Grove [facility] ............... 1:49 PM, 01/04/22

## 2022-01-04 NOTE — TELEPHONE ENCOUNTER
Patient's bone density scan did show significant osteoporosis.  Have her schedule appointment with the osteoporosis clinic to discuss her treatment options.

## 2022-01-05 ENCOUNTER — TELEPHONE (OUTPATIENT)
Dept: INTERNAL MEDICINE | Facility: CLINIC | Age: 77
End: 2022-01-05
Payer: MEDICARE

## 2022-01-05 DIAGNOSIS — J43.9 PULMONARY EMPHYSEMA, UNSPECIFIED EMPHYSEMA TYPE (H): Primary | ICD-10-CM

## 2022-01-05 RX ORDER — AZITHROMYCIN 250 MG/1
TABLET, FILM COATED ORAL
Qty: 6 TABLET | Refills: 0 | Status: SHIPPED | OUTPATIENT
Start: 2022-01-05 | End: 2022-01-10

## 2022-01-07 ENCOUNTER — TRANSFERRED RECORDS (OUTPATIENT)
Dept: HEALTH INFORMATION MANAGEMENT | Facility: CLINIC | Age: 77
End: 2022-01-07
Payer: MEDICARE

## 2022-01-07 ENCOUNTER — MEDICAL CORRESPONDENCE (OUTPATIENT)
Dept: HEALTH INFORMATION MANAGEMENT | Facility: CLINIC | Age: 77
End: 2022-01-07
Payer: MEDICARE

## 2022-01-10 ENCOUNTER — TELEPHONE (OUTPATIENT)
Dept: INTERNAL MEDICINE | Facility: CLINIC | Age: 77
End: 2022-01-10
Payer: MEDICARE

## 2022-01-10 NOTE — CONFIDENTIAL NOTE
Called patient to notify her of negative covid results and that Dr. Pandey sent in a Z-kash to her pharmacy. Patient states she is feeling better about 75% improved, still has a little cough and some phlegm. No fever or other new symptoms. She never received the Z-kash so she has not started this. She states she has a steroid injection on 1/19/22 and is wondering if the Z-kash should be started now? Will the Z-kash interfere with the injection? She had a virtual visit on 1/3/21 with Dr. Pandey.     Sweetie Hanna CSS

## 2022-01-10 NOTE — TELEPHONE ENCOUNTER
If the patient is 75% better and no shortness of breath or fever, she does not need to take additional Zithromax.  But if she feels she still needs it, she can proceed.  Azithromycin Z-Arthur will not interfere with injection.

## 2022-01-10 NOTE — TELEPHONE ENCOUNTER
----- Message from Oracio Pandey MD sent at 1/5/2022  2:16 PM CST -----  Please call and let her know that her Covid test returned negative.  With her history of COPD, I would recommend starting antibiotic Z-Arthur which was sent to her pharmacy.  If she has persistent symptoms, she should schedule an appointment to be seen at the office.

## 2022-01-11 NOTE — TELEPHONE ENCOUNTER
Patient notified of clinician's message and verbalized understanding. No further questions at this time.   Dottie Grove CMA ............... 10:14 AM, 01/11/22

## 2022-01-12 ENCOUNTER — OFFICE VISIT (OUTPATIENT)
Dept: FAMILY MEDICINE | Facility: CLINIC | Age: 77
End: 2022-01-12
Payer: MEDICARE

## 2022-01-12 VITALS
SYSTOLIC BLOOD PRESSURE: 140 MMHG | HEART RATE: 66 BPM | WEIGHT: 110 LBS | DIASTOLIC BLOOD PRESSURE: 80 MMHG | OXYGEN SATURATION: 90 % | TEMPERATURE: 97.9 F | BODY MASS INDEX: 22.22 KG/M2

## 2022-01-12 DIAGNOSIS — I42.8 NONISCHEMIC CARDIOMYOPATHY (H): ICD-10-CM

## 2022-01-12 DIAGNOSIS — M81.0 OSTEOPOROSIS: ICD-10-CM

## 2022-01-12 DIAGNOSIS — J44.89 ASTHMA-COPD OVERLAP SYNDROME (H): ICD-10-CM

## 2022-01-12 DIAGNOSIS — I10 BENIGN ESSENTIAL HYPERTENSION: ICD-10-CM

## 2022-01-12 LAB
ALP SERPL-CCNC: 51 U/L (ref 45–120)
ANION GAP SERPL CALCULATED.3IONS-SCNC: 14 MMOL/L (ref 5–18)
BUN SERPL-MCNC: 18 MG/DL (ref 8–28)
CALCIUM SERPL-MCNC: 9.1 MG/DL (ref 8.5–10.5)
CHLORIDE BLD-SCNC: 105 MMOL/L (ref 98–107)
CO2 SERPL-SCNC: 23 MMOL/L (ref 22–31)
CREAT SERPL-MCNC: 1.55 MG/DL (ref 0.6–1.1)
GFR SERPL CREATININE-BSD FRML MDRD: 34 ML/MIN/1.73M2
GLUCOSE BLD-MCNC: 77 MG/DL (ref 70–125)
POTASSIUM BLD-SCNC: 3.4 MMOL/L (ref 3.5–5)
PTH-INTACT SERPL-MCNC: 129 PG/ML (ref 10–86)
SODIUM SERPL-SCNC: 142 MMOL/L (ref 136–145)

## 2022-01-12 PROCEDURE — 83970 ASSAY OF PARATHORMONE: CPT | Performed by: NURSE PRACTITIONER

## 2022-01-12 PROCEDURE — 80048 BASIC METABOLIC PNL TOTAL CA: CPT | Performed by: NURSE PRACTITIONER

## 2022-01-12 PROCEDURE — 82306 VITAMIN D 25 HYDROXY: CPT | Performed by: NURSE PRACTITIONER

## 2022-01-12 PROCEDURE — 82785 ASSAY OF IGE: CPT | Performed by: NURSE PRACTITIONER

## 2022-01-12 PROCEDURE — 84075 ASSAY ALKALINE PHOSPHATASE: CPT | Performed by: NURSE PRACTITIONER

## 2022-01-12 PROCEDURE — 36415 COLL VENOUS BLD VENIPUNCTURE: CPT | Performed by: NURSE PRACTITIONER

## 2022-01-12 PROCEDURE — 99214 OFFICE O/P EST MOD 30 MIN: CPT | Performed by: NURSE PRACTITIONER

## 2022-01-12 NOTE — PATIENT INSTRUCTIONS
Recheck potassium and kidney labs today.    I will check the IgE and forwarded on to your pulmonologist.    We will get your osteoporosis doctors' labs completed today.    Please plan on following up with your PCP sometime in the spring.    Remember to use your hypertonic saline nebulizer treatments as well as your Acapella device.

## 2022-01-12 NOTE — PROGRESS NOTES
Assessment & Plan     Benign essential hypertension  Blood pressure a bit on the low side of normal today but she is asymptomatic.  We will not be making any changes to her regimen today.  She is due for a recheck of her BMP today  - Basic metabolic panel; Future    Asthma-COPD overlap syndrome (H)  She had a virtual appointment with her pulmonologist last week.  It was recommended that she try hypertonic saline nebulizer treatments to help with some of her phlegm.  She was also told that she should have her IgE rechecked so that she may consider reapplying for Xolair.  - IgE; Future    Nonischemic cardiomyopathy (H)  She appears well compensated today.  Continues on furosemide.  She follows with the CHF nurse practitioner.  She is due for lab recheck today    Patient Instructions   Recheck potassium and kidney labs today.    I will check the IgE and forwarded on to your pulmonologist.    We will get your osteoporosis doctors' labs completed today.    Please plan on following up with your PCP sometime in the spring.    Remember to use your hypertonic saline nebulizer treatments as well as your Acapella device.        Ordering of each unique test  16 minutes spent on the date of the encounter doing chart review, history and exam, documentation and further activities per the note     See Patient Instructions    Return in about 3 months (around 4/12/2022) for Follow up.    Aakash Flores, [facility]    Erik Brown is a 76 year old who presents for the following health issues     HPI     Patient here for follow-up.  She also needs some lab work completed.    Has osteoporosis and is establishing care with a osteoporosis specialist.  She has lab requisition orders with her today.    She has a history of asthma COPD overlap syndrome.  On Trelegy.  DuoNeb solution.  She was told to try hypertonic saline neb treatments to help with secretions but has not yet picked up that  prescription because the pharmacy is out of stock.    There was also a conversation about rechecking an IgE.  This has been elevated in the past; there had been some conversations about reapplying for Xolair.  She would need to come for injections every 2 weeks.  This was not very appealing for her in the past but she is reconsidering given that her respiratory status has been subpar over the last 6 months      Review of Systems   Constitutional, HEENT, cardiovascular, pulmonary, gi and gu systems are negative, except as otherwise noted.      Objective    BP (!) 89/55 (BP Location: Right arm, Patient Position: Sitting, Cuff Size: Adult Regular)   Pulse 66   Temp 97.9  F (36.6  C) (Oral)   Wt 49.9 kg (110 lb)   SpO2 90%   BMI 22.22 kg/m    Body mass index is 22.22 kg/m .  Physical Exam     Sitting comfortably in the exam room on her wheelchair.  Portable oxygen unit in place.  Intermittent cough with secretions present in the upper airways

## 2022-01-13 ENCOUNTER — VIRTUAL VISIT (OUTPATIENT)
Dept: RADIATION ONCOLOGY | Facility: CLINIC | Age: 77
End: 2022-01-13
Attending: CLINICAL NURSE SPECIALIST
Payer: MEDICARE

## 2022-01-13 DIAGNOSIS — Z51.5 ENCOUNTER FOR PALLIATIVE CARE: ICD-10-CM

## 2022-01-13 DIAGNOSIS — R63.0 ANOREXIA: ICD-10-CM

## 2022-01-13 DIAGNOSIS — J43.9 PULMONARY EMPHYSEMA, UNSPECIFIED EMPHYSEMA TYPE (H): ICD-10-CM

## 2022-01-13 DIAGNOSIS — G89.4 CHRONIC PAIN SYNDROME: ICD-10-CM

## 2022-01-13 DIAGNOSIS — R06.02 SOB (SHORTNESS OF BREATH): Primary | ICD-10-CM

## 2022-01-13 DIAGNOSIS — R05.9 COUGH: ICD-10-CM

## 2022-01-13 LAB — DEPRECATED CALCIDIOL+CALCIFEROL SERPL-MC: 62 UG/L (ref 30–80)

## 2022-01-13 PROCEDURE — 99213 OFFICE O/P EST LOW 20 MIN: CPT | Mod: 95 | Performed by: CLINICAL NURSE SPECIALIST

## 2022-01-13 RX ORDER — TRAMADOL HYDROCHLORIDE 50 MG/1
50 TABLET ORAL 2 TIMES DAILY PRN
Qty: 60 TABLET | Refills: 0 | Status: SHIPPED | OUTPATIENT
Start: 2022-01-13 | End: 2022-02-24

## 2022-01-13 RX ORDER — BENZONATATE 100 MG/1
100 CAPSULE ORAL 3 TIMES DAILY PRN
Qty: 90 CAPSULE | Refills: 1 | Status: ON HOLD | OUTPATIENT
Start: 2022-01-13 | End: 2022-04-22

## 2022-01-13 RX ORDER — AZITHROMYCIN 250 MG/1
TABLET, FILM COATED ORAL
COMMUNITY
Start: 2022-01-11 | End: 2022-02-14

## 2022-01-13 NOTE — PROGRESS NOTES
"[facility]  Palliative Care Daily Progress Note    \"This video visit will be conducted via a call between you and yourphysician/provider. We have found that certain health care needs can be provided without the need for an in-person physical exam.  This service lets us provide the care you need with a video conversation.  If aprescription is necessary we can send it directly to your pharmacy.  If lab work is needed we can place an order for that and you can then stop by our lab to have the test done at a later time.     Video visits arebilled at different rates depending on your insurance coverage. Please reach out to your insurance provider with any questions.     If during the course of the call the physician/provider feels a video visit is notappropriate, you will not be charged for this service.\"     Patient has given verbal consent to a Video visit? yes     Video Start - end Time:  1022 -1040    Code status: FULL CODE     Impressions, Recommendations & Counseling   SYMPTOM MANAGEMENT  1. Shortness of breath secondary to severe COPD and chronic CHF  -Continue oxygen as at home.  - Continue inhalers as at home.  - Continue close follow up with pulmonology and cardiology.  - Currently in Zpack and prednisone to treat respiratory flare.     2. Chronic pain syndrome with chronic back pain from T12 compression fracture  3/9/2021  and QTc 516 on EKG.  - Patient sees orthopedics for injections. Scheduled epidural injection 1/20/2022.  - Continue Gabapentin 600 mg by mouth twice daily.  - ContinueTramadol 50 mg by mouth twice daily as needed for pain.   - Continue Salonpas and Voltaren.  - Previously discussed role of pain clinic as an excellent resource for multimodal pain control. Patient aware and will consider for future if pain uncontrolled.     3. Anorexia and weight loss (30 pound weight loss since 3/2021) - weight stable  - Continue to monitor.   - Patient watching diet and intake. " "Eating twice a day now.     4.  Cough-productive cough with white phlegm.  - Currently in Zpack and prednisone to treat respiratory flare.  -Tessalon Perles 100 milligrams by mouth 3 times a day as needed for cough.  Report this helps with cough.     ADVANCED CARE PLANNING AND GOALS OF CARE DISCUSSION  - Palliative Care Encounter - Please see discussion below.  - Follow up with Palliative care in 4 months.  - Goals are restorative.  - Code Status: FULL CODE.  - DTR, Leelee Evans, her decision maker.  - Mail patient blank copy of [facility] document in past.  - Call [phone] with questions or needs.      HPI        Last seen by Palliative care 10/14/2021.    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       Today, the patient was seen for:  Cough, shortness of breath and back pain, support     Prognosis, Goals, or Advance Care Planning was addressed today with: Yes.  Mood, coping, and/or meaning in the context of serious illness were addressed today: Yes.  Summary/Comments: Patient in good spirits and feels that overall she is doing well despite this cough.               Interval History:     Key Palliative Symptoms:  # Pain severity the last 12 hours: moderate  # Dyspnea severity the last 12 hours: moderate  # Nausea severity the last 12 hours: none  # Anxiety severity the last 12 hours: none  C/O left hip pain (previous replacement - 8/10 and midback pain 8/10 that \"I can barely stand for 1 minute without having to sit down.\" Hopeful epidural injection 1/20/2022 will help.  Anorexia and weight loss: reports stable weight. Eating twice " daily. 110 lbs reported.  Patient is on opioids: assessed and bowels ok/no needed changes to plan of care today.           Review of Systems:     Besides above, an additional system ROS was reviewed and is unremarkable          Medications:     Medications and PDMP reviewed.           Physical Exam:   BP: (140)/(80) 140/80    GENERAL: Well groomed, no distress, alert, calm  SKIN: Warm and dry. No visible rash noted   HEENT: Normocephalic, anicteric sclera  LUNGS:  non-labored sitting in recliner chair with oxygen in place and speaking.  EXTREMITIES: Moves upper extremities equally  NEUROLOGIC: Alert, oriented to person, place, situation and year  PSYCH: Calm, conversant, appropriate             Data Reviewed:     Pertinent Labs  Lab Results: personally reviewed.   Lab Results   Component Value Date     01/12/2022    CO2 23 01/12/2022    BUN 18 01/12/2022     Lab Results   Component Value Date    WBC 10.0 06/02/2021    HGB 13.3 06/02/2021    HCT 41.7 06/02/2021    MCV 88 06/02/2021     06/02/2021     AST   Date Value Ref Range Status   07/29/2021 23 0 - 40 U/L Final     ALT   Date Value Ref Range Status   07/29/2021 12 0 - 45 U/L Final     Alkaline Phosphatase   Date Value Ref Range Status   01/12/2022 51 45 - 120 U/L Final     Albumin   Date Value Ref Range Status   07/29/2021 3.9 3.5 - 5.0 g/dL Final         Radiology Results  DX Hip/Pelvis/Spine    Result Date: 1/3/2022  EXAM: DX WRIST/HEEL/RADIUS, DX HIP/PELVIS/SPINE LOCATION: [facility] DATE/TIME: 1/3/2022 3:08 PM INDICATION: Postmenopausal. Prior left total hip replacement. COMPARISON: 03/25/2019 TECHNIQUE: Dual-energy x-ray absorptiometry performed with routine technique. FINDINGS: Lumbar Spine: L1-L2: BMD: 0.923 g/cm2. T-score: -2.0. Z-score: -0.2 RIGHT Hip Total: BMD: 0.630 g/cm2. T-score: -3.0. Z-score: -1.2 RIGHT Hip Femoral neck: BMD: 0.655 g/cm2. T-score: -2.8. Z-score: -0.8 RIGHT Radius 33%: BMD: 0.635 g/cm2.  T-score: -2.8. Z-score: -0.5 WHO Criteria: Normal: T score at or above -1 SD Osteopenia: T score between -1 and -2.5 SD Osteoporosis: T score at or below -2.5 SD COMPARISON: There has been a 10.2 % decrease in radius 33% BMD.     IMPRESSION: OSTEOPOROSIS. T score meets the World Health Organization (WHO) criteria for osteoporosis at one or more measured sites. The risk of osteoporotic fracture increased approximately two-fold for each SD decrease in T-score.     DX Wrist Heel Radius    Result Date: 1/3/2022  EXAM: DX WRIST/HEEL/RADIUS, DX HIP/PELVIS/SPINE LOCATION: [facility] DATE/TIME: 1/3/2022 3:08 PM INDICATION: Postmenopausal. Prior left total hip replacement. COMPARISON: 03/25/2019 TECHNIQUE: Dual-energy x-ray absorptiometry performed with routine technique. FINDINGS: Lumbar Spine: L1-L2: BMD: 0.923 g/cm2. T-score: -2.0. Z-score: -0.2 RIGHT Hip Total: BMD: 0.630 g/cm2. T-score: -3.0. Z-score: -1.2 RIGHT Hip Femoral neck: BMD: 0.655 g/cm2. T-score: -2.8. Z-score: -0.8 RIGHT Radius 33%: BMD: 0.635 g/cm2. T-score: -2.8. Z-score: -0.5 WHO Criteria: Normal: T score at or above -1 SD Osteopenia: T score between -1 and -2.5 SD Osteoporosis: T score at or below -2.5 SD COMPARISON: There has been a 10.2 % decrease in radius 33% BMD.     IMPRESSION: OSTEOPOROSIS. T score meets the World Health Organization (WHO) criteria for osteoporosis at one or more measured sites. The risk of osteoporotic fracture increased approximately two-fold for each SD decrease in T-score.          TTS: I have personally spent a total of 20 minutes today in above encounter reviewing patient's medical record, consultation with the clinic providers, assessing patient and symptoms and providing emotional support with more than 50% of this time spent face to face with patient.    DELMIS Dobson, [facility]  Palliative Care  [phone]

## 2022-01-13 NOTE — PROGRESS NOTES
Stephanie is a 76 year old who is being evaluated via a billable video visit.      How would you like to obtain your AVS? MyChart  If the video visit is dropped, the invitation should be resent by: Text to cell phone: [phone]  Will anyone else be joining your video visit? No      Video Start Time: 1015  Video-Visit Details    Type of service:  Video Visit    Video End Time:    Originating Location (pt. Location): Home    Distant Location (provider location):  [facility] [place]     Platform used for Video Visit: Steve     Pt called prior to video visit, see assessment. Further recommendations and orders per provider.

## 2022-01-17 LAB — IGE SERPL-ACNC: 578 KU/L (ref 0–114)

## 2022-01-19 ENCOUNTER — TRANSFERRED RECORDS (OUTPATIENT)
Dept: HEALTH INFORMATION MANAGEMENT | Facility: CLINIC | Age: 77
End: 2022-01-19
Payer: MEDICARE

## 2022-01-20 ENCOUNTER — TELEPHONE (OUTPATIENT)
Dept: INTERNAL MEDICINE | Facility: CLINIC | Age: 77
End: 2022-01-20
Payer: MEDICARE

## 2022-01-20 DIAGNOSIS — N32.81 OVERACTIVE BLADDER: ICD-10-CM

## 2022-01-20 DIAGNOSIS — R32 URINARY INCONTINENCE, UNSPECIFIED TYPE: ICD-10-CM

## 2022-01-20 NOTE — TELEPHONE ENCOUNTER
Reason for Call:  Medication or medication refill: 3 refill requests    Do you use a [facility]? NO  Name of the pharmacy and phone number for the current request: [facility] home delivery [address] in [place], [phone]    Name of the medication requested:      solifenacin (VESICARE) 5 MG tablet 90 tablet 3 11/1/2021  No   Sig - Route: Take 1 tablet (5 mg) by mouth At Bedtime - Oral     mirabegron (MYRBETRIQ) 50 MG 24 hr tablet 90 tablet 3 11/1/2021  No   Sig: [MYRBETRIQ 50 MG TB24 ER TABLET] TAKE 1 TABLET DAILY   Sent to pharmacy as: Mirabegron ER 50 MG Oral Tablet Extended Release 24 Hour (Myrbetriq)     traZODone (DESYREL) 50 MG tablet (Discontinued)    10/25/2021 --   Sig - Route: Take 50 mg by mouth At Bedtime - Oral     Can we leave a detailed message on this number? YES    Phone number patient can be reached at: Home number on file [phone] (home)    Best Time: ANY    Call taken on 1/20/2022 at 4:25 PM by Anita White

## 2022-01-21 RX ORDER — SOLIFENACIN SUCCINATE 5 MG/1
5 TABLET, FILM COATED ORAL AT BEDTIME
Qty: 90 TABLET | Refills: 3 | Status: SHIPPED | OUTPATIENT
Start: 2022-01-21 | End: 2022-01-25

## 2022-01-21 RX ORDER — MIRABEGRON 50 MG/1
TABLET, EXTENDED RELEASE ORAL
Qty: 90 TABLET | Refills: 3 | Status: SHIPPED | OUTPATIENT
Start: 2022-01-21 | End: 2022-01-31

## 2022-01-21 NOTE — TELEPHONE ENCOUNTER
Notified pt that rx for Vesicare and Mybetriq was sent to Dr Montanez to sign.  Trazodone looks like is was D/C'd in Oct.   She was ok with the 2 refills.  Rxs keyed up and sent to Dr Montanez to sign

## 2022-01-25 DIAGNOSIS — Z86.73 HISTORY OF STROKE: Primary | ICD-10-CM

## 2022-01-25 DIAGNOSIS — R32 URINARY INCONTINENCE, UNSPECIFIED TYPE: ICD-10-CM

## 2022-01-25 RX ORDER — SOLIFENACIN SUCCINATE 5 MG/1
5 TABLET, FILM COATED ORAL AT BEDTIME
Qty: 90 TABLET | Refills: 3 | Status: SHIPPED | OUTPATIENT
Start: 2022-01-25 | End: 2022-01-31

## 2022-01-25 NOTE — TELEPHONE ENCOUNTER
Refill Request  Medication name: Pending Prescriptions:                       Disp   Refills    solifenacin (VESICARE) 5 MG tablet        90 tab*3            Sig: Take 1 tablet (5 mg) by mouth At Bedtime    Who prescribed the medication: Tarik  Last refill on medication: 01/21/22  [facility]  Last appointment with PCP: 09/07/21  Next appointment: Not due

## 2022-01-31 DIAGNOSIS — N32.81 OVERACTIVE BLADDER: ICD-10-CM

## 2022-01-31 DIAGNOSIS — R32 URINARY INCONTINENCE, UNSPECIFIED TYPE: ICD-10-CM

## 2022-01-31 RX ORDER — MIRABEGRON 50 MG/1
TABLET, EXTENDED RELEASE ORAL
Qty: 90 TABLET | Refills: 3 | Status: SHIPPED | OUTPATIENT
Start: 2022-01-31 | End: 2022-04-22

## 2022-01-31 RX ORDER — SOLIFENACIN SUCCINATE 5 MG/1
5 TABLET, FILM COATED ORAL AT BEDTIME
Qty: 90 TABLET | Refills: 3 | Status: SHIPPED | OUTPATIENT
Start: 2022-01-31 | End: 2022-04-22

## 2022-01-31 NOTE — TELEPHONE ENCOUNTER
Looks like the mirabegron was signed / filled on 1/21/22  The solifenacin was signed / filled on 1/25/22

## 2022-01-31 NOTE — TELEPHONE ENCOUNTER
1-31-22  Reason for Call:  Medication     Do you use a [facility]? [facility]   Name of the medication requested:   solifenacin (VESICARE) 5 MG tablet  &   mirabegron (MYRBETRIQ) 50 MG 24 hr tablet    Other request: pt out of meds     Can we leave a detailed message on this number? YES    Phone number patient can be reached at: Cell number on file:    Telephone Information:   Mobile [phone]       Best Time: anthime    Call taken on 1/31/2022 at 10:18 AM by Zahra Shaikh

## 2022-02-03 PROBLEM — M81.0 SENILE OSTEOPOROSIS: Status: ACTIVE | Noted: 2022-02-03

## 2022-02-09 ENCOUNTER — TRANSFERRED RECORDS (OUTPATIENT)
Dept: HEALTH INFORMATION MANAGEMENT | Facility: CLINIC | Age: 77
End: 2022-02-09
Payer: MEDICARE

## 2022-02-14 ENCOUNTER — OFFICE VISIT (OUTPATIENT)
Dept: INTERNAL MEDICINE | Facility: CLINIC | Age: 77
End: 2022-02-14
Payer: MEDICARE

## 2022-02-14 VITALS — DIASTOLIC BLOOD PRESSURE: 62 MMHG | HEART RATE: 76 BPM | SYSTOLIC BLOOD PRESSURE: 90 MMHG | OXYGEN SATURATION: 100 %

## 2022-02-14 DIAGNOSIS — M80.00XD OSTEOPOROSIS WITH CURRENT PATHOLOGICAL FRACTURE WITH ROUTINE HEALING, UNSPECIFIED OSTEOPOROSIS TYPE, SUBSEQUENT ENCOUNTER: ICD-10-CM

## 2022-02-14 DIAGNOSIS — N18.30 CHRONIC RENAL INSUFFICIENCY, STAGE 3 (MODERATE) (H): ICD-10-CM

## 2022-02-14 DIAGNOSIS — J43.9 PULMONARY EMPHYSEMA, UNSPECIFIED EMPHYSEMA TYPE (H): Primary | ICD-10-CM

## 2022-02-14 DIAGNOSIS — Z86.718 PERSONAL HISTORY OF DVT (DEEP VEIN THROMBOSIS): ICD-10-CM

## 2022-02-14 DIAGNOSIS — G47.00 INSOMNIA, UNSPECIFIED TYPE: ICD-10-CM

## 2022-02-14 PROCEDURE — 99214 OFFICE O/P EST MOD 30 MIN: CPT | Performed by: INTERNAL MEDICINE

## 2022-02-14 NOTE — PATIENT INSTRUCTIONS
Regular daily exercise, known as pulmonary rehabilitation, is recommended to improve strengthening in order to compensate for your lung disease.  You could consider starting a regular exercise routine at [facility].    Continue to wear your oxygen 24 hours a day.    If your leg swelling is not significant, and you feel at all lightheaded dizzy or have a low blood pressure, you could consider taking furosemide once a day on certain days.    You had worsening of your mild renal insufficiency, progressed to moderate renal insufficiency with urosepsis episode in March 2021.  Renal insufficiency is a scarring of your kidney function.  Kidney function labs were improving on check last month.    You should not be taking any ibuprofen or Aleve type medications.    Do not skip meals, late low blood pressure.  Stay well-hydrated at all times.    You will have nights where you do not sleep.  If your insomnia becomes severe or severe anxiety, contact me to discuss your medication options.  Sleeping medication can affect your breathing at night, and could worsen low oxygen levels at night, however.    You can talk with your pulmonary clinic about reapplying for Xolair injections for your lung condition, as you did have a high IgE level.    Proceed with osteoporosis treatment as planned.    Follow-up in 3 months with me for a blood pressure and kidney function recheck.  Do not come fasting for that visit.

## 2022-02-14 NOTE — PROGRESS NOTES
[facility] clinic Follow Up Note    Irene León   77 year old female    Date of Visit: 2/14/2022    Chief Complaint   Patient presents with     RECHECK     Pt is not fasting     Subjective  Stephanie is here with significant other for a follow-up visit on her emphysema and overall review of her medical issues.    She has chronic renal insufficiency.  I did review her medical records back to October 2020 with a creatinine of 1.1.  March 2021 she had a creatinine of 1.3.  But she was hospitalized with sepsis at that time.  The discharge record did note acute renal injury associated with sepsis.    Patient is no longer on losartan.    April 2021 creatinine was 1.5.  June 2021 creatinine 1.7.  July 2021 creatinine 2.2.    January 12, 2022 creatinine 1.55 with potassium 3.4.    She continues on furosemide 80 mg once a day and potassium supplement for her severe chronic lower extremity edema associated with pulmonary hypertension.    Currently her lower extremity edema is just at trace to +1 level but it has been severe in the past.    She also takes Toprol-XL 75 mg in the evening.  No history of A. fib.    Patient has been treated for COPD flare in August of last year in November of last year as well as late December.  Covid negative at that time.  She is on her inhalers.  Has been treated with doxycycline and prednisone previously but not currently on prednisone.    There was consideration for Xolair treatment but she did not proceed with it.  She did have an elevated IgE level of 578 last month.    She denies a flare in her shortness of breath or wheezing.  No new cough or fever.  He continues on oxygen 2 L continuous.    Quit smoking at age 63.    She had a history of cardiomyopathy October 2020 ejection fraction 40% with LVH and moderate pulmonary hypertension.    The July 2021 heart echo showed normalization of ejection fraction of 55-60% without valve abnormality and just mild pulmonary  hypertension.    History of DVT with heterozygous factor V Leiden mutation on Xarelto.  No bleeding issues.  June 2021 chest x-ray negative.    History of stage I breast cancer with lumpectomy and radiation 2017.    Osteoporosis with a T12 compression fracture October 2020 with chronic back pain.  She is going to start a new class and then plans Prolia through the osteoporosis clinic.  Her PTH level was mildly high at 129 with a normal vitamin D level of 62 and normal calcium.  Alkaline phosphatase 51, these labs were last month.    She has a history of long QTc associated medication previously.  Was 516 March 2021.  No longer on azithromycin.  No longer on Effexor or trazodone.  She still has tramadol on her medication list, unclear how frequent she is using that, I believe rarely.  She has been using it up to twice a day previously.    Irritable bladder without complaints today.  On Vesicare and Myrbetriq    Chronic insomnia.  No longer on trazodone.  She does have nights where she does not sleep well.  She does tend to sleep restless, but does have severe hypoxia and wears oxygen at night.  No worsening shortness of breath.  No worsening anxiety.    Patient has not been getting regular walking exercise, she does complain of significant weakness and chronic back pain.  She was talked about starting pool therapy and he did talk about [facility].    PMHx:    Past Medical History:   Diagnosis Date     ANATOLIY (acute kidney injury) (H)      Allergic rhinitis      Arrhythmia      Atrial fibrillation with RVR (H)      Bacteremia      Breast cancer (H) 2017     Cardiomyopathy (H)      Centrilobular emphysema (H)      CHF (congestive heart failure) (H)      CKD (chronic kidney disease)      COPD (chronic obstructive pulmonary disease) (H)      Coronary artery disease      Depression      Dysphagia      E. coli sepsis (H)      Factor 5 Leiden mutation, heterozygous (H)      GERD (gastroesophageal reflux disease)      Hx of  radiation therapy 2017     Hyperlipidemia      Hypertension      Hypokalemia      Hypomagnesemia      Idiopathic cardiomyopathy (H)      Left hip pain 4/30/2014     OAB (overactive bladder)      Osteoporosis      Sacral insufficiency fracture      Sinusitis      Syncope      Urge incontinence      Vocal cord dysfunction      PSHx:    Past Surgical History:   Procedure Laterality Date     ARTHROPLASTY REVISION HIP Left      BIOPSY BREAST Right 2017     BLADDER SURGERY      bladder interstim with removal     CARDIAC CATHETERIZATION       CATARACT EXTRACTION Left 07/18/2017     IR ABDOMINAL AORTOGRAM  4/16/2003     IR AORTIC ARCH 4 VESSEL ANGIOGRAM  4/16/2003     IR MISCELLANEOUS PROCEDURE  4/16/2003     LUMPECTOMY BREAST Left 06/2017    x2     ZZC OPEN TX FEMORAL FRACTURE DISTAL MED/LAT CONDYLE Left 10/28/2015    Procedure: OPEN REDUCTION INTERNAL FIXATION LEFT  PROXIMAL FEMUR PERIPROSTHETIC FRACTURE;  Surgeon: Yovanny Albraran MD;  Location: [facility];  Service: Orthopedics     Immunizations:   Immunization History   Administered Date(s) Administered     COVID-19,PF,Pfizer (12+ Yrs) 03/02/2021, 03/23/2021, 12/08/2021     FLU 6-35 months 11/03/2003     Flu 65+ Years 09/20/2017, 09/25/2018, 10/10/2019     Flu, Unspecified 10/01/2016, 10/01/2018     [id number] Novel Flu 01/05/2010     Influenza (H1N1) 01/05/2010     Influenza (High Dose) 3 valent vaccine 10/30/2014, 10/01/2015, 11/26/2016, 09/25/2018, 10/10/2019, 10/19/2020     Influenza (IIV3) PF 11/03/2003, 11/08/2006, 10/24/2007, 11/28/2008, 10/09/2009, 10/22/2010, 01/26/2012, 10/03/2012, 11/01/2013     Influenza, Quad, High Dose, Pf, 65yr+ (Fluzone HD) 10/19/2020, 12/15/2020     Mantoux Tuberculin Skin Test 12/13/2007, 10/24/2020, 11/03/2020     Pneumo Conj 13-V (2010&after) 05/11/2015     Pneumococcal 23 valent 11/24/1997, 11/21/2007, 05/17/2017     TD (ADULT, 7+) 07/20/2004     TDAP Vaccine (Boostrix) 02/12/2016     Tdap (Adacel,Boostrix) 02/12/2016      Zoster vaccine recombinant adjuvanted (SHINGRIX) 07/31/2020     Zoster vaccine, live 02/18/2009, 10/20/2014       ROS A comprehensive review of systems was performed and was otherwise negative    Medications, allergies, and problem list were reviewed and updated    Exam  BP 90/62 (BP Location: Right arm, Patient Position: Sitting)   Pulse 76   SpO2 100%   Frail elderly female with significant diffuse muscle atrophy.  Alert and oriented x3 and nonfocal neurologic exam.  Moderate kyphosis.  Lungs with decreased breath sounds throughout, but no wheezing or crackles.  She has trace-+1 ankle edema bilaterally which is significant decrease from previous.  Abdomen is nontender heart is regular without murmur.    Assessment/Plan  1. Pulmonary emphysema, unspecified emphysema type (H)  Severe emphysema with mild pulmonary hypertension, oxygen dependent.  Last exacerbation in December with prednisone use but not on chronic prednisone.    Continue current inhalers.  She is bronchodilator responsive.    I encouraged her to talk with her pulmonologist about considering going back on Xolair, she does have an elevated IgE level.    Patient is still full CODE STATUS.    2. Chronic renal insufficiency, stage 3 (moderate) (H)  Patient had worsening of her mild chronic renal insufficiency with a sepsis episode, consistent with ATN in March 2021.  Patient has had some worsening of her creatinine since then, however.  I suspect her pulmonary hypertension and poor cardiac output may be affecting.    Patient had some nausea this morning and was not able to keep down her breakfast, which she states happens intermittently.  I suspect she is mildly volume depleted today.  Her blood pressure was down to 100/70.    We did discuss having her temporarily reduce her furosemide down to 40 mg a day, but with recognition that she has had a history of severe edema in the past.  On days where she has minimal lower extremity edema such as today and  lower blood pressure, she can reduce her furosemide on that day to 40 milligrams.    Follow-up in 3 months recheck kidney labs and monitor diuretic therapy.    I did review today that she should not be taking NSAIDs    3. Personal history of DVT (deep vein thrombosis)  Chronic Xarelto    4. Osteoporosis with current pathological fracture with routine healing, unspecified osteoporosis type, subsequent encounter  We will start Reclast, then Prolia, managed by osteoporosis clinic.    I encouraged her strongly to get on a daily walking weightbearing exercise routine release she is walking around her chair walking around her residence multiple times during the day.  I encouraged her to use a walker, she is at higher fall risk.    We discussed starting a regular exercise program at [facility]    Chronic back pain with history of compression fracture.  Pain is improving.  Previous discussion on tramadol use and risk with her history of long QTc.  I have recommended she avoid tramadol.    5. Insomnia, unspecified type  I recommended she avoid the risk of using trazodone.  She does have nocturnal hypoxia with oxygen, that may worsen her lung condition.  It may increase confusion at night increase fall risk.  I would change patient's expectations about sleeping, she will have nights where she does not sleep well.  Contact me if significant elevating anxiety levels or depression or severe insomnia, we can discuss her options at that time again.    History of nonischemic cardiomyopathy with systolic dysfunction in 2010, although ejection fraction normalized on July 2021 echo.  Main issue is pulmonary hypertension.  I did review the cardiology note from January 2021, but no mention of any ischemic coronary disease or need to use simvastatin.  Patient still has simvastatin on her medication list.  Patient does have coronary disease on her past medical history list.    She had a angiogram in 2003 it was described as minimal  coronary artery disease and a nuclear stress test negative in 2016.    History of occipital stroke.  In 2018 she had an MRI that showed high-grade stenosis of the P2 and P3 segment of the left posterior cerebral artery.  Therefore I will plan to have her continue on the simvastatin at this time.    Patient is on Xarelto for blood thinner.    History of chronic pain since 2015 redo of the left hip replacement with extensive scar tissue.    History of breast cancer stage I in 2017 without recurrence.  June 2021 she had a negative mammogram.    History of chronic irritable bladder on Vesicare and Myrbetriq  Return in about 3 months (around 5/14/2022) for Follow up.   Patient Instructions   Regular daily exercise, known as pulmonary rehabilitation, is recommended to improve strengthening in order to compensate for your lung disease.  You could consider starting a regular exercise routine at [facility].    Continue to wear your oxygen 24 hours a day.    If your leg swelling is not significant, and you feel at all lightheaded dizzy or have a low blood pressure, you could consider taking furosemide once a day on certain days.    You had worsening of your mild renal insufficiency, progressed to moderate renal insufficiency with urosepsis episode in March 2021.  Renal insufficiency is a scarring of your kidney function.  Kidney function labs were improving on check last month.    You should not be taking any ibuprofen or Aleve type medications.    Do not skip meals, late low blood pressure.  Stay well-hydrated at all times.    You will have nights where you do not sleep.  If your insomnia becomes severe or severe anxiety, contact me to discuss your medication options.  Sleeping medication can affect your breathing at night, and could worsen low oxygen levels at night, however.    You can talk with your pulmonary clinic about reapplying for Xolair injections for your lung condition, as you did have a high IgE  level.    Proceed with osteoporosis treatment as planned.    Follow-up in 3 months with me for a blood pressure and kidney function recheck.  Do not come fasting for that visit.    Pankaj Montanez MD, MD        Current Outpatient Medications   Medication Sig Dispense Refill     acetaminophen (TYLENOL) 500 MG tablet [ACETAMINOPHEN (TYLENOL) 500 MG TABLET] Take 1,000 mg by mouth 3 (three) times a day.       albuterol (PROAIR HFA/PROVENTIL HFA/VENTOLIN HFA) 108 (90 Base) MCG/ACT inhaler Inhale 2 puffs into the lungs every 4 hours as needed for shortness of breath / dyspnea 17 g 1     ascorbic acid, vitamin C, (VITAMIN C) 1000 MG tablet [ASCORBIC ACID, VITAMIN C, (VITAMIN C) 1000 MG TABLET] Take 1,000 mg by mouth daily. Airborne 1 tab daily       ATROVENT HFA 17 mcg/actuation inhaler [ATROVENT HFA 17 MCG/ACTUATION INHALER] USE 2 INHALATIONS EVERY 6 HOURS 1 Inhaler 3     azelastine (ASTELIN) 137 mcg (0.1 %) nasal spray [AZELASTINE (ASTELIN) 137 MCG (0.1 %) NASAL SPRAY] 2 sprays into each nostril daily.       benzonatate (TESSALON) 100 MG capsule Take 1 capsule (100 mg) by mouth 3 times daily as needed for cough 90 capsule 1     cetirizine (ZYRTEC) 10 MG tablet Take 10 mg by mouth daily       chlorhexidine (PERIDEX) 0.12 % solution [CHLORHEXIDINE (PERIDEX) 0.12 % SOLUTION] Apply 15 mL to the mouth or throat at bedtime as needed.        cholecalciferol, vitamin D3, 1,000 unit tablet [CHOLECALCIFEROL, VITAMIN D3, 1,000 UNIT TABLET] Take 1,000 Units by mouth daily.       doxycycline hyclate (VIBRA-TABS) 100 MG tablet Take 1 tablet (100 mg) by mouth 2 times daily 20 tablet 0     EPINEPHrine (EPIPEN/ADRENACLICK/AUVI-Q) 0.3 mg/0.3 mL injection Inject 0.3 mg into the muscle        famotidine (PEPCID) 20 MG tablet Take 1 tablet (20 mg) by mouth 2 times daily 180 tablet 0     fluticasone-umeclidinium-vilanterol (TRELEGY ELLIPTA) 100-62.5-25 mcg DsDv inhaler [FLUTICASONE-UMECLIDINIUM-VILANTEROL (TRELEGY ELLIPTA) 100-62.5-25 MCG DSDV  INHALER] Inhale 1 Inhalation daily. 1 each 3     furosemide (LASIX) 40 MG tablet Take 1 tablet (40 mg) by mouth 2 times daily (Patient taking differently: Take 80 mg by mouth daily ) 180 tablet 1     gabapentin (NEURONTIN) 300 MG capsule Take 2 capsules (600 mg) by mouth 2 times daily (Patient taking differently: Take 600 mg by mouth daily ) 360 capsule 2     ipratropium-albuteroL (DUO-NEB) 0.5-2.5 mg/3 mL nebulizer [IPRATROPIUM-ALBUTEROL (DUO-NEB) 0.5-2.5 MG/3 ML NEBULIZER] Inhale 3 mL 4 (four) times a day. 120 vial 1     loperamide (IMODIUM) 2 mg capsule [LOPERAMIDE (IMODIUM) 2 MG CAPSULE] Take 4 mg by mouth as needed for diarrhea (Every 3 hours as needed for Diarrhea).       magnesium chloride (SLOW-MAG) 64 mg TbEC delayed-release tablet [MAGNESIUM CHLORIDE (SLOW-MAG) 64 MG TBEC DELAYED-RELEASE TABLET] Take 1 tablet (64 mg total) by mouth daily. 90 tablet 3     metoprolol succinate ER (TOPROL-XL) 50 MG 24 hr tablet Take 1.5 tablets (75 mg) by mouth At Bedtime 135 tablet 3     mirabegron (MYRBETRIQ) 50 MG 24 hr tablet [MYRBETRIQ 50 MG TB24 ER TABLET] TAKE 1 TABLET DAILY 90 tablet 3     potassium chloride ER (K-TAB/KLOR-CON) 10 MEQ CR tablet Take 1 tablet (10 mEq) by mouth 2 times daily 180 tablet 3     rivaroxaban ANTICOAGULANT (XARELTO) 15 MG TABS tablet Take 1 tablet (15 mg) by mouth At Bedtime 90 tablet 3     simvastatin (ZOCOR) 40 MG tablet Take 1 tablet (40 mg) by mouth At Bedtime 90 tablet 3     solifenacin (VESICARE) 5 MG tablet Take 1 tablet (5 mg) by mouth At Bedtime 90 tablet 3     tamoxifen (NOLVADEX) 20 MG tablet [TAMOXIFEN (NOLVADEX) 20 MG TABLET] TAKE 1 TABLET DAILY 90 tablet 3     traMADol (ULTRAM) 50 MG tablet Take 1 tablet (50 mg) by mouth 2 times daily as needed for severe pain 60 tablet 0     Allergies   Allergen Reactions     Sulfa (Sulfonamide Antibiotics) [Sulfa Drugs] Rash     Headaches and dizziness.     Homeopathic Drugs [External Allergen Needs Reconciliation - See Comment] Unknown and  Other (See Comments)     Comment: runny nose, Comment: runny nose     Mold Extracts [Molds & Smuts] Unknown     Morphine (Pf) [Morphine] Unknown     hallucinate     Lisinopril Cough, Unknown and Itching     Comment: cough, Comment: cough     Sulfacetamide Sodium [Sulfacetamide] Rash     Social History     Tobacco Use     Smoking status: Former Smoker     Quit date: 10/28/2007     Years since quittin.3     Smokeless tobacco: Former User     Quit date: 2004   Substance Use Topics     Alcohol use: No     Drug use: No

## 2022-02-15 ENCOUNTER — INFUSION THERAPY VISIT (OUTPATIENT)
Dept: INFUSION THERAPY | Facility: CLINIC | Age: 77
End: 2022-02-15
Payer: MEDICARE

## 2022-02-15 VITALS
TEMPERATURE: 97.7 F | OXYGEN SATURATION: 98 % | SYSTOLIC BLOOD PRESSURE: 164 MMHG | DIASTOLIC BLOOD PRESSURE: 65 MMHG | RESPIRATION RATE: 18 BRPM | HEART RATE: 59 BPM

## 2022-02-15 DIAGNOSIS — M81.0 SENILE OSTEOPOROSIS: Primary | ICD-10-CM

## 2022-02-15 LAB
CALCIUM SERPL-MCNC: 10 MG/DL (ref 8.5–10.5)
CREAT SERPL-MCNC: 2 MG/DL (ref 0.6–1.1)
GFR SERPL CREATININE-BSD FRML MDRD: 25 ML/MIN/1.73M2

## 2022-02-15 PROCEDURE — 250N000011 HC RX IP 250 OP 636: Performed by: FAMILY MEDICINE

## 2022-02-15 PROCEDURE — 96372 THER/PROPH/DIAG INJ SC/IM: CPT | Performed by: FAMILY MEDICINE

## 2022-02-15 PROCEDURE — 82310 ASSAY OF CALCIUM: CPT | Performed by: FAMILY MEDICINE

## 2022-02-15 PROCEDURE — 82565 ASSAY OF CREATININE: CPT | Performed by: FAMILY MEDICINE

## 2022-02-15 PROCEDURE — 36415 COLL VENOUS BLD VENIPUNCTURE: CPT | Performed by: FAMILY MEDICINE

## 2022-02-15 RX ORDER — EPINEPHRINE 1 MG/ML
0.3 INJECTION, SOLUTION, CONCENTRATE INTRAVENOUS EVERY 5 MIN PRN
Status: CANCELLED | OUTPATIENT
Start: 2022-03-15

## 2022-02-15 RX ORDER — MEPERIDINE HYDROCHLORIDE 50 MG/ML
25 INJECTION INTRAMUSCULAR; INTRAVENOUS; SUBCUTANEOUS EVERY 30 MIN PRN
Status: CANCELLED | OUTPATIENT
Start: 2022-03-15

## 2022-02-15 RX ORDER — DIPHENHYDRAMINE HYDROCHLORIDE 50 MG/ML
50 INJECTION INTRAMUSCULAR; INTRAVENOUS
Status: DISCONTINUED | OUTPATIENT
Start: 2022-02-15 | End: 2022-02-15 | Stop reason: HOSPADM

## 2022-02-15 RX ORDER — ALBUTEROL SULFATE 90 UG/1
1-2 AEROSOL, METERED RESPIRATORY (INHALATION)
Status: CANCELLED
Start: 2022-03-15

## 2022-02-15 RX ORDER — MEPERIDINE HYDROCHLORIDE 50 MG/ML
25 INJECTION INTRAMUSCULAR; INTRAVENOUS; SUBCUTANEOUS EVERY 30 MIN PRN
Status: DISCONTINUED | OUTPATIENT
Start: 2022-02-15 | End: 2022-02-15 | Stop reason: HOSPADM

## 2022-02-15 RX ORDER — EPINEPHRINE 1 MG/ML
0.3 INJECTION, SOLUTION, CONCENTRATE INTRAVENOUS EVERY 5 MIN PRN
Status: DISCONTINUED | OUTPATIENT
Start: 2022-02-15 | End: 2022-02-15 | Stop reason: HOSPADM

## 2022-02-15 RX ORDER — ALBUTEROL SULFATE 90 UG/1
1-2 AEROSOL, METERED RESPIRATORY (INHALATION)
Status: DISCONTINUED | OUTPATIENT
Start: 2022-02-15 | End: 2022-02-15 | Stop reason: HOSPADM

## 2022-02-15 RX ORDER — METHYLPREDNISOLONE SODIUM SUCCINATE 125 MG/2ML
125 INJECTION, POWDER, LYOPHILIZED, FOR SOLUTION INTRAMUSCULAR; INTRAVENOUS
Status: DISCONTINUED | OUTPATIENT
Start: 2022-02-15 | End: 2022-02-15 | Stop reason: HOSPADM

## 2022-02-15 RX ORDER — ALBUTEROL SULFATE 0.83 MG/ML
2.5 SOLUTION RESPIRATORY (INHALATION)
Status: CANCELLED | OUTPATIENT
Start: 2022-03-15

## 2022-02-15 RX ORDER — METHYLPREDNISOLONE SODIUM SUCCINATE 125 MG/2ML
125 INJECTION, POWDER, LYOPHILIZED, FOR SOLUTION INTRAMUSCULAR; INTRAVENOUS
Status: CANCELLED
Start: 2022-03-15

## 2022-02-15 RX ORDER — ALBUTEROL SULFATE 0.83 MG/ML
2.5 SOLUTION RESPIRATORY (INHALATION)
Status: DISCONTINUED | OUTPATIENT
Start: 2022-02-15 | End: 2022-02-15 | Stop reason: HOSPADM

## 2022-02-15 RX ORDER — NALOXONE HYDROCHLORIDE 0.4 MG/ML
0.2 INJECTION, SOLUTION INTRAMUSCULAR; INTRAVENOUS; SUBCUTANEOUS
Status: CANCELLED | OUTPATIENT
Start: 2022-03-15

## 2022-02-15 RX ORDER — DIPHENHYDRAMINE HYDROCHLORIDE 50 MG/ML
50 INJECTION INTRAMUSCULAR; INTRAVENOUS
Status: CANCELLED
Start: 2022-03-15

## 2022-02-15 RX ORDER — NALOXONE HYDROCHLORIDE 0.4 MG/ML
0.2 INJECTION, SOLUTION INTRAMUSCULAR; INTRAVENOUS; SUBCUTANEOUS
Status: DISCONTINUED | OUTPATIENT
Start: 2022-02-15 | End: 2022-02-15 | Stop reason: HOSPADM

## 2022-02-15 RX ADMIN — ROMOSOZUMAB-AQQG 210 MG: 105 INJECTION, SOLUTION SUBCUTANEOUS at 10:00

## 2022-02-15 NOTE — PROGRESS NOTES
Stephanie was here today for labs and injection.  Pt tolerated well and was discharged.    Yamini Booker CMA

## 2022-02-23 DIAGNOSIS — G89.4 CHRONIC PAIN SYNDROME: ICD-10-CM

## 2022-02-23 NOTE — TELEPHONE ENCOUNTER
Received telephone call from patient requesting refill of tramadol, gabapentin.     Last refill of tramadol: 1/13/22  Last refill of gabapentin: 9/12/21  Last office visit: 1/13/22  Scheduled for follow up pending, due mid-May. Message sent to scheduling.     Will route request to MD for review.     Reviewed [place]  Report.

## 2022-02-24 RX ORDER — GABAPENTIN 300 MG/1
600 CAPSULE ORAL 2 TIMES DAILY
Qty: 360 CAPSULE | Refills: 1 | Status: ON HOLD | OUTPATIENT
Start: 2022-02-24 | End: 2022-04-22

## 2022-02-24 RX ORDER — TRAMADOL HYDROCHLORIDE 50 MG/1
50 TABLET ORAL 2 TIMES DAILY PRN
Qty: 60 TABLET | Refills: 0 | Status: ON HOLD | OUTPATIENT
Start: 2022-02-24 | End: 2022-04-22

## 2022-02-28 ENCOUNTER — TELEPHONE (OUTPATIENT)
Dept: INTERNAL MEDICINE | Facility: CLINIC | Age: 77
End: 2022-02-28
Payer: MEDICARE

## 2022-02-28 NOTE — TELEPHONE ENCOUNTER
Reason for Call:  Other call back and prescription    Detailed comments: Pt requesting a prescription about a persistent cough that she's been having for 2 days now. Please advise.     Phone Number Patient can be reached at: Cell number on file:    Telephone Information:   Mobile [phone]       Best Time: any    Can we leave a detailed message on this number? YES    Call taken on 2/28/2022 at 2:50 PM by Aniket Shipman

## 2022-03-01 NOTE — TELEPHONE ENCOUNTER
I do not recommend prescription narcotic cough medicine.  Patient can get over-the-counter cough medicine with Robitussin-DM.  Patient can make an appointment for further evaluation of cough, if further evaluation is desired.

## 2022-03-01 NOTE — TELEPHONE ENCOUNTER
Dr. Montanez    I called and spoke with patient and she said that she has had this cough for the past three days.  She is coughing up mucus but it isn't colored.  She doesn't have a fever no sore throat or any other symptoms.  She is wondering if you can call in a cough syrup for her.  She uses [facility].

## 2022-03-02 DIAGNOSIS — Z17.0 MALIGNANT NEOPLASM OF CENTRAL PORTION OF LEFT BREAST IN FEMALE, ESTROGEN RECEPTOR POSITIVE (H): ICD-10-CM

## 2022-03-02 DIAGNOSIS — C50.112 MALIGNANT NEOPLASM OF CENTRAL PORTION OF LEFT BREAST IN FEMALE, ESTROGEN RECEPTOR POSITIVE (H): ICD-10-CM

## 2022-03-02 RX ORDER — TAMOXIFEN CITRATE 20 MG/1
TABLET ORAL
Qty: 90 TABLET | Refills: 3 | Status: SHIPPED | OUTPATIENT
Start: 2022-03-02 | End: 2023-01-01

## 2022-03-10 ENCOUNTER — TELEPHONE (OUTPATIENT)
Dept: INTERNAL MEDICINE | Facility: CLINIC | Age: 77
End: 2022-03-10
Payer: MEDICARE

## 2022-03-10 NOTE — TELEPHONE ENCOUNTER
I do not recommend sedating medications for sleep with her lung condition.  She can try melatonin at night, that is over-the-counter.  Okay for Tylenol, but I do not recommend Tylenol PM.    Make sure patient is wearing her oxygen in the evening.    She can get a referral to the sleep clinic for further evaluation.

## 2022-03-10 NOTE — TELEPHONE ENCOUNTER
Reason for Call:  Other call back and prescription    Detailed comments: Pt is calling about getting a medication prescribed to her to help her sleep at night. She states she takes tramadol for pain and Dr Montanez did not want to give her trazodone and she has been taking the two for a long time. Pt is wondering if melatonin or tylenol pm would help her. She is not sleeping and 3 hrs a night is what she is getting, its not enough. If she continues like this, she will get weaker and weaker. Please advise.    Phone Number Patient can be reached at: Cell number on file:    Telephone Information:   Mobile [phone]       Best Time: any    Can we leave a detailed message on this number? YES    Call taken on 3/10/2022 at 3:54 PM by Aniket Shipman

## 2022-03-11 NOTE — TELEPHONE ENCOUNTER
Patient notified of clinician's message and verbalized understanding. No further questions at this time. Patient will start with Melatonin and go from there.

## 2022-03-15 DIAGNOSIS — J44.9 CHRONIC OBSTRUCTIVE PULMONARY DISEASE, UNSPECIFIED COPD TYPE (H): ICD-10-CM

## 2022-03-15 RX ORDER — IPRATROPIUM BROMIDE 17 UG/1
AEROSOL, METERED RESPIRATORY (INHALATION)
Qty: 77.4 G | Refills: 3 | Status: SHIPPED | OUTPATIENT
Start: 2022-03-15 | End: 2022-09-27

## 2022-03-15 NOTE — TELEPHONE ENCOUNTER
Reason for Call:  Other prescription    Detailed comments: PT is requests a new Rx sent for a 90 day supply. Pt states PCP sent a 30 day supply. Please send new Rx to [facility]. Patient is requesting 3 inhalers per refill. ( total 9 inhalers).  Each refill Equaling a 90 day supply. Each inhaler is 26 days.      Disp Refills Start End PARISA   ATROVENT HFA 17 mcg/actuation inhaler 1 Inhaler 3 5/6/2021  No   Sig: [ATROVENT HFA 17 MCG/ACTUATION INHALER] USE 2 INHALATIONS EVERY 6 HOURS     Phone Number Patient can be reached at: Cell number on file:    Telephone Information:   Mobile [phone]       Best Time: ANY    Can we leave a detailed message on this number? YES    Call taken on 3/15/2022 at 12:00 PM by Anita White

## 2022-03-21 ENCOUNTER — INFUSION THERAPY VISIT (OUTPATIENT)
Dept: INFUSION THERAPY | Facility: CLINIC | Age: 77
End: 2022-03-21
Attending: INTERNAL MEDICINE
Payer: MEDICARE

## 2022-03-21 VITALS
HEART RATE: 60 BPM | SYSTOLIC BLOOD PRESSURE: 158 MMHG | WEIGHT: 110 LBS | DIASTOLIC BLOOD PRESSURE: 65 MMHG | BODY MASS INDEX: 22.22 KG/M2 | RESPIRATION RATE: 16 BRPM | TEMPERATURE: 98 F | OXYGEN SATURATION: 92 %

## 2022-03-21 DIAGNOSIS — M81.0 SENILE OSTEOPOROSIS: Primary | ICD-10-CM

## 2022-03-21 PROCEDURE — 96372 THER/PROPH/DIAG INJ SC/IM: CPT | Performed by: FAMILY MEDICINE

## 2022-03-21 PROCEDURE — 250N000011 HC RX IP 250 OP 636: Performed by: FAMILY MEDICINE

## 2022-03-21 RX ORDER — ALBUTEROL SULFATE 0.83 MG/ML
2.5 SOLUTION RESPIRATORY (INHALATION)
Status: DISCONTINUED | OUTPATIENT
Start: 2022-03-21 | End: 2022-03-21 | Stop reason: HOSPADM

## 2022-03-21 RX ORDER — ALBUTEROL SULFATE 0.83 MG/ML
2.5 SOLUTION RESPIRATORY (INHALATION)
Status: CANCELLED | OUTPATIENT
Start: 2022-04-12

## 2022-03-21 RX ORDER — METHYLPREDNISOLONE SODIUM SUCCINATE 125 MG/2ML
125 INJECTION, POWDER, LYOPHILIZED, FOR SOLUTION INTRAMUSCULAR; INTRAVENOUS
Status: CANCELLED
Start: 2022-04-12

## 2022-03-21 RX ORDER — ALBUTEROL SULFATE 90 UG/1
1-2 AEROSOL, METERED RESPIRATORY (INHALATION)
Status: DISCONTINUED | OUTPATIENT
Start: 2022-03-21 | End: 2022-03-21 | Stop reason: HOSPADM

## 2022-03-21 RX ORDER — DIPHENHYDRAMINE HYDROCHLORIDE 50 MG/ML
50 INJECTION INTRAMUSCULAR; INTRAVENOUS
Status: CANCELLED
Start: 2022-04-12

## 2022-03-21 RX ORDER — METHYLPREDNISOLONE SODIUM SUCCINATE 125 MG/2ML
125 INJECTION, POWDER, LYOPHILIZED, FOR SOLUTION INTRAMUSCULAR; INTRAVENOUS
Status: DISCONTINUED | OUTPATIENT
Start: 2022-03-21 | End: 2022-03-21 | Stop reason: HOSPADM

## 2022-03-21 RX ORDER — DIPHENHYDRAMINE HYDROCHLORIDE 50 MG/ML
50 INJECTION INTRAMUSCULAR; INTRAVENOUS
Status: DISCONTINUED | OUTPATIENT
Start: 2022-03-21 | End: 2022-03-21 | Stop reason: HOSPADM

## 2022-03-21 RX ORDER — MEPERIDINE HYDROCHLORIDE 50 MG/ML
25 INJECTION INTRAMUSCULAR; INTRAVENOUS; SUBCUTANEOUS EVERY 30 MIN PRN
Status: DISCONTINUED | OUTPATIENT
Start: 2022-03-21 | End: 2022-03-21 | Stop reason: HOSPADM

## 2022-03-21 RX ORDER — ALBUTEROL SULFATE 90 UG/1
1-2 AEROSOL, METERED RESPIRATORY (INHALATION)
Status: CANCELLED
Start: 2022-04-12

## 2022-03-21 RX ORDER — NALOXONE HYDROCHLORIDE 0.4 MG/ML
0.2 INJECTION, SOLUTION INTRAMUSCULAR; INTRAVENOUS; SUBCUTANEOUS
Status: CANCELLED | OUTPATIENT
Start: 2022-04-12

## 2022-03-21 RX ORDER — EPINEPHRINE 1 MG/ML
0.3 INJECTION, SOLUTION, CONCENTRATE INTRAVENOUS EVERY 5 MIN PRN
Status: DISCONTINUED | OUTPATIENT
Start: 2022-03-21 | End: 2022-03-21 | Stop reason: HOSPADM

## 2022-03-21 RX ORDER — EPINEPHRINE 1 MG/ML
0.3 INJECTION, SOLUTION, CONCENTRATE INTRAVENOUS EVERY 5 MIN PRN
Status: CANCELLED | OUTPATIENT
Start: 2022-04-12

## 2022-03-21 RX ORDER — MEPERIDINE HYDROCHLORIDE 50 MG/ML
25 INJECTION INTRAMUSCULAR; INTRAVENOUS; SUBCUTANEOUS EVERY 30 MIN PRN
Status: CANCELLED | OUTPATIENT
Start: 2022-04-12

## 2022-03-21 RX ORDER — NALOXONE HYDROCHLORIDE 0.4 MG/ML
0.2 INJECTION, SOLUTION INTRAMUSCULAR; INTRAVENOUS; SUBCUTANEOUS
Status: DISCONTINUED | OUTPATIENT
Start: 2022-03-21 | End: 2022-03-21 | Stop reason: HOSPADM

## 2022-03-21 RX ADMIN — ROMOSOZUMAB-AQQG 210 MG: 105 INJECTION, SOLUTION SUBCUTANEOUS at 12:15

## 2022-03-21 ASSESSMENT — PAIN SCALES - GENERAL: PAINLEVEL: NO PAIN (0)

## 2022-03-21 NOTE — PROGRESS NOTES
Patient in clinic for injection (Evenity). Tolerated well. No complaints.       Brigida Griffin  [facility]  P. [phone]  F. [phone]

## 2022-03-28 ENCOUNTER — TELEPHONE (OUTPATIENT)
Dept: INTERNAL MEDICINE | Facility: CLINIC | Age: 77
End: 2022-03-28
Payer: MEDICARE

## 2022-03-28 NOTE — TELEPHONE ENCOUNTER
3-28-22  Reason for Call:  Verbal orders     Detailed comments: alexei called from [facility] and looking for verbal orders for:  Delay in start of care, pt request for 3-31-22    Phone Number Patient can be reached at: [phone]    Best Time: antytime    Can we leave a detailed message on this number? YES    Call taken on 3/28/2022 at 10:16 AM by Zahra Shaikh

## 2022-03-28 NOTE — TELEPHONE ENCOUNTER
Called and spoke with NATE Perez, at [facility] (Adele's co-worker).  Relayed April's verbal okay for orders.

## 2022-03-29 ENCOUNTER — MEDICAL CORRESPONDENCE (OUTPATIENT)
Dept: HEALTH INFORMATION MANAGEMENT | Facility: CLINIC | Age: 77
End: 2022-03-29
Payer: MEDICARE

## 2022-03-30 ENCOUNTER — MEDICAL CORRESPONDENCE (OUTPATIENT)
Dept: HEALTH INFORMATION MANAGEMENT | Facility: CLINIC | Age: 77
End: 2022-03-30
Payer: MEDICARE

## 2022-03-31 ENCOUNTER — TELEPHONE (OUTPATIENT)
Dept: INTERNAL MEDICINE | Facility: CLINIC | Age: 77
End: 2022-03-31
Payer: MEDICARE

## 2022-03-31 ENCOUNTER — MEDICAL CORRESPONDENCE (OUTPATIENT)
Dept: HEALTH INFORMATION MANAGEMENT | Facility: CLINIC | Age: 77
End: 2022-03-31
Payer: MEDICARE

## 2022-03-31 NOTE — TELEPHONE ENCOUNTER
Reason for Call:  Home Health Care    Elsa with [facility] called regarding (reason for call): Verbal Orders    Orders are needed for this patient. PT    PT: Yes - 2 times a week for 4 weeks  1 time a week for 4 weeks  For strengthening, gate and balance.    She is also asking about medication list - needs verification on some medication pt is no longer taking.    OT: None    Skilled Nursing: None    Pt Provider: Dr Montanez    Phone Number Homecare Nurse can be reached at: [phone]    Can we leave a detailed message on this number? YES    Phone number patient can be reached at: Home number on file [phone] (home)    Best Time: any    Call taken on 3/31/2022 at 12:57 PM by Aniket Shipman

## 2022-04-04 ENCOUNTER — PATIENT OUTREACH (OUTPATIENT)
Dept: ONCOLOGY | Facility: CLINIC | Age: 77
End: 2022-04-04
Payer: MEDICARE

## 2022-04-04 NOTE — PROGRESS NOTES
"Patient called to report 3 days of severe Lt breast pain. Rates at 10 of 10 \"most of the time.\"  Hx: Invasive ductal carcinoma of the left breast since 6/17.  Using heating pad \"non-stop.\" Advised she only use heat for 15 minutes every hour or so. She is also taking Tylenol 1000 mg four times daily.   Denies changes to skin color, no nipple discharge, no dimpling, no known trauma, \"well I might have bent over wrong\".   Advised she be seen by PCP for evaluation.   The patient was contacted and is aware of plan and verbalizes agreement.  Rose Marie Davies RN    "

## 2022-04-05 NOTE — PROGRESS NOTES
"Called patient as Dr. Suarez had cancellation this morning, was going to offer appointment.   Patient was given the phone after spouse answered and I identified myself. She was moaning constantly, \"oh God\". \"oh dear\", labored breathing.   She said she was not doing well. I said I could hear and she should seek urgent medical help, she said \"I am.\" Rose Marie Davies RN    "

## 2022-04-07 ENCOUNTER — APPOINTMENT (OUTPATIENT)
Dept: ULTRASOUND IMAGING | Facility: CLINIC | Age: 77
DRG: 177 | End: 2022-04-07
Payer: MEDICARE

## 2022-04-07 ENCOUNTER — TELEPHONE (OUTPATIENT)
Dept: INTERNAL MEDICINE | Facility: CLINIC | Age: 77
End: 2022-04-07
Payer: MEDICARE

## 2022-04-07 ENCOUNTER — HOSPITAL ENCOUNTER (INPATIENT)
Facility: CLINIC | Age: 77
LOS: 15 days | Discharge: NURSING, REHAB OR LONG-TERM CARE | DRG: 177 | End: 2022-04-22
Attending: EMERGENCY MEDICINE | Admitting: FAMILY MEDICINE
Payer: MEDICARE

## 2022-04-07 ENCOUNTER — DOCUMENTATION ONLY (OUTPATIENT)
Dept: OTHER | Facility: CLINIC | Age: 77
End: 2022-04-07

## 2022-04-07 ENCOUNTER — APPOINTMENT (OUTPATIENT)
Dept: NUCLEAR MEDICINE | Facility: CLINIC | Age: 77
DRG: 177 | End: 2022-04-07
Payer: MEDICARE

## 2022-04-07 ENCOUNTER — APPOINTMENT (OUTPATIENT)
Dept: RADIOLOGY | Facility: CLINIC | Age: 77
DRG: 177 | End: 2022-04-07
Payer: MEDICARE

## 2022-04-07 DIAGNOSIS — J18.9 PNEUMONIA DUE TO INFECTIOUS ORGANISM, UNSPECIFIED LATERALITY, UNSPECIFIED PART OF LUNG: ICD-10-CM

## 2022-04-07 DIAGNOSIS — J44.1 COPD EXACERBATION (H): ICD-10-CM

## 2022-04-07 DIAGNOSIS — J43.9 PULMONARY EMPHYSEMA, UNSPECIFIED EMPHYSEMA TYPE (H): ICD-10-CM

## 2022-04-07 DIAGNOSIS — J85.1 ABSCESS OF UPPER LOBE OF LEFT LUNG WITH PNEUMONIA (H): Primary | ICD-10-CM

## 2022-04-07 DIAGNOSIS — A04.72 C. DIFFICILE COLITIS: ICD-10-CM

## 2022-04-07 DIAGNOSIS — I50.33 ACUTE ON CHRONIC DIASTOLIC CONGESTIVE HEART FAILURE (H): ICD-10-CM

## 2022-04-07 LAB
ANION GAP SERPL CALCULATED.3IONS-SCNC: 13 MMOL/L (ref 5–18)
APTT PPP: 30 SECONDS (ref 22–38)
BASE EXCESS BLDV CALC-SCNC: -4 MMOL/L
BASOPHILS # BLD MANUAL: 0 10E3/UL (ref 0–0.2)
BASOPHILS NFR BLD MANUAL: 0 %
BNP SERPL-MCNC: 157 PG/ML (ref 0–144)
BUN SERPL-MCNC: 36 MG/DL (ref 8–28)
C REACTIVE PROTEIN LHE: 36.9 MG/DL (ref 0–0.8)
CALCIUM SERPL-MCNC: 8.5 MG/DL (ref 8.5–10.5)
CHLORIDE BLD-SCNC: 103 MMOL/L (ref 98–107)
CO2 SERPL-SCNC: 20 MMOL/L (ref 22–31)
CREAT SERPL-MCNC: 2.44 MG/DL (ref 0.6–1.1)
EOSINOPHIL # BLD MANUAL: 0 10E3/UL (ref 0–0.7)
EOSINOPHIL NFR BLD MANUAL: 0 %
ERYTHROCYTE [DISTWIDTH] IN BLOOD BY AUTOMATED COUNT: 13.9 % (ref 10–15)
FLUAV RNA SPEC QL NAA+PROBE: NEGATIVE
FLUBV RNA RESP QL NAA+PROBE: NEGATIVE
GFR SERPL CREATININE-BSD FRML MDRD: 20 ML/MIN/1.73M2
GLUCOSE BLD-MCNC: 89 MG/DL (ref 70–125)
HCO3 BLDV-SCNC: 21 MMOL/L (ref 24–30)
HCT VFR BLD AUTO: 33.1 % (ref 35–47)
HGB BLD-MCNC: 10.9 G/DL (ref 11.7–15.7)
HOLD SPECIMEN: NORMAL
LACTATE SERPL-SCNC: 1.7 MMOL/L (ref 0.7–2)
LYMPHOCYTES # BLD MANUAL: 0.7 10E3/UL (ref 0.8–5.3)
LYMPHOCYTES NFR BLD MANUAL: 2 %
MAGNESIUM SERPL-MCNC: 1.9 MG/DL (ref 1.8–2.6)
MCH RBC QN AUTO: 28.3 PG (ref 26.5–33)
MCHC RBC AUTO-ENTMCNC: 32.9 G/DL (ref 31.5–36.5)
MCV RBC AUTO: 86 FL (ref 78–100)
MONOCYTES # BLD MANUAL: 4 10E3/UL (ref 0–1.3)
MONOCYTES NFR BLD MANUAL: 11 %
NEUTROPHILS # BLD MANUAL: 31.8 10E3/UL (ref 1.6–8.3)
NEUTROPHILS NFR BLD MANUAL: 87 %
OXYHGB MFR BLDV: 53 % (ref 70–75)
PCO2 BLDV: 40 MM HG (ref 35–50)
PH BLDV: 7.34 [PH] (ref 7.35–7.45)
PLAT MORPH BLD: ABNORMAL
PLATELET # BLD AUTO: 241 10E3/UL (ref 150–450)
PO2 BLDV: 29 MM HG (ref 25–47)
POTASSIUM BLD-SCNC: 3.5 MMOL/L (ref 3.5–5)
PROCALCITONIN SERPL-MCNC: 6.5 NG/ML (ref 0–0.49)
RBC # BLD AUTO: 3.85 10E6/UL (ref 3.8–5.2)
RBC MORPH BLD: ABNORMAL
SAO2 % BLDV: 54.1 % (ref 70–75)
SARS-COV-2 RNA RESP QL NAA+PROBE: NEGATIVE
SODIUM SERPL-SCNC: 136 MMOL/L (ref 136–145)
TROPONIN I SERPL-MCNC: 0.03 NG/ML (ref 0–0.29)
TROPONIN I SERPL-MCNC: 0.03 NG/ML (ref 0–0.29)
WBC # BLD AUTO: 36.5 10E3/UL (ref 4–11)

## 2022-04-07 PROCEDURE — 96365 THER/PROPH/DIAG IV INF INIT: CPT

## 2022-04-07 PROCEDURE — 250N000009 HC RX 250: Performed by: STUDENT IN AN ORGANIZED HEALTH CARE EDUCATION/TRAINING PROGRAM

## 2022-04-07 PROCEDURE — 343N000001 HC RX 343: Performed by: EMERGENCY MEDICINE

## 2022-04-07 PROCEDURE — 78580 LUNG PERFUSION IMAGING: CPT

## 2022-04-07 PROCEDURE — 86140 C-REACTIVE PROTEIN: CPT | Performed by: STUDENT IN AN ORGANIZED HEALTH CARE EDUCATION/TRAINING PROGRAM

## 2022-04-07 PROCEDURE — 82805 BLOOD GASES W/O2 SATURATION: CPT | Performed by: PHYSICIAN ASSISTANT

## 2022-04-07 PROCEDURE — C9803 HOPD COVID-19 SPEC COLLECT: HCPCS

## 2022-04-07 PROCEDURE — 250N000013 HC RX MED GY IP 250 OP 250 PS 637: Performed by: PHYSICIAN ASSISTANT

## 2022-04-07 PROCEDURE — 85027 COMPLETE CBC AUTOMATED: CPT | Performed by: PHYSICIAN ASSISTANT

## 2022-04-07 PROCEDURE — 71045 X-RAY EXAM CHEST 1 VIEW: CPT

## 2022-04-07 PROCEDURE — 96375 TX/PRO/DX INJ NEW DRUG ADDON: CPT

## 2022-04-07 PROCEDURE — 83735 ASSAY OF MAGNESIUM: CPT | Performed by: PHYSICIAN ASSISTANT

## 2022-04-07 PROCEDURE — 36415 COLL VENOUS BLD VENIPUNCTURE: CPT | Performed by: STUDENT IN AN ORGANIZED HEALTH CARE EDUCATION/TRAINING PROGRAM

## 2022-04-07 PROCEDURE — 250N000011 HC RX IP 250 OP 636: Performed by: PHYSICIAN ASSISTANT

## 2022-04-07 PROCEDURE — 999N000157 HC STATISTIC RCP TIME EA 10 MIN

## 2022-04-07 PROCEDURE — 250N000009 HC RX 250: Performed by: PHYSICIAN ASSISTANT

## 2022-04-07 PROCEDURE — A9540 TC99M MAA: HCPCS | Performed by: EMERGENCY MEDICINE

## 2022-04-07 PROCEDURE — 85730 THROMBOPLASTIN TIME PARTIAL: CPT | Performed by: PHYSICIAN ASSISTANT

## 2022-04-07 PROCEDURE — 93005 ELECTROCARDIOGRAM TRACING: CPT | Performed by: PHYSICIAN ASSISTANT

## 2022-04-07 PROCEDURE — 258N000003 HC RX IP 258 OP 636: Performed by: PHYSICIAN ASSISTANT

## 2022-04-07 PROCEDURE — 83880 ASSAY OF NATRIURETIC PEPTIDE: CPT | Performed by: PHYSICIAN ASSISTANT

## 2022-04-07 PROCEDURE — 99291 CRITICAL CARE FIRST HOUR: CPT

## 2022-04-07 PROCEDURE — 94640 AIRWAY INHALATION TREATMENT: CPT | Mod: 76

## 2022-04-07 PROCEDURE — 250N000013 HC RX MED GY IP 250 OP 250 PS 637: Performed by: STUDENT IN AN ORGANIZED HEALTH CARE EDUCATION/TRAINING PROGRAM

## 2022-04-07 PROCEDURE — 84145 PROCALCITONIN (PCT): CPT | Performed by: STUDENT IN AN ORGANIZED HEALTH CARE EDUCATION/TRAINING PROGRAM

## 2022-04-07 PROCEDURE — 87636 SARSCOV2 & INF A&B AMP PRB: CPT | Performed by: PHYSICIAN ASSISTANT

## 2022-04-07 PROCEDURE — 258N000003 HC RX IP 258 OP 636: Performed by: STUDENT IN AN ORGANIZED HEALTH CARE EDUCATION/TRAINING PROGRAM

## 2022-04-07 PROCEDURE — 250N000013 HC RX MED GY IP 250 OP 250 PS 637: Performed by: FAMILY MEDICINE

## 2022-04-07 PROCEDURE — 80048 BASIC METABOLIC PNL TOTAL CA: CPT | Performed by: PHYSICIAN ASSISTANT

## 2022-04-07 PROCEDURE — 36415 COLL VENOUS BLD VENIPUNCTURE: CPT | Performed by: PHYSICIAN ASSISTANT

## 2022-04-07 PROCEDURE — 84484 ASSAY OF TROPONIN QUANT: CPT | Performed by: PHYSICIAN ASSISTANT

## 2022-04-07 PROCEDURE — 94640 AIRWAY INHALATION TREATMENT: CPT

## 2022-04-07 PROCEDURE — 120N000001 HC R&B MED SURG/OB

## 2022-04-07 PROCEDURE — 93970 EXTREMITY STUDY: CPT

## 2022-04-07 PROCEDURE — 250N000011 HC RX IP 250 OP 636: Performed by: FAMILY MEDICINE

## 2022-04-07 PROCEDURE — 83605 ASSAY OF LACTIC ACID: CPT | Performed by: STUDENT IN AN ORGANIZED HEALTH CARE EDUCATION/TRAINING PROGRAM

## 2022-04-07 PROCEDURE — 999N000156 HC STATISTIC RCP CONSULT EA 30 MIN

## 2022-04-07 PROCEDURE — 96368 THER/DIAG CONCURRENT INF: CPT

## 2022-04-07 PROCEDURE — 87040 BLOOD CULTURE FOR BACTERIA: CPT | Performed by: STUDENT IN AN ORGANIZED HEALTH CARE EDUCATION/TRAINING PROGRAM

## 2022-04-07 RX ORDER — FUROSEMIDE 40 MG
40 TABLET ORAL 2 TIMES DAILY
Status: DISCONTINUED | OUTPATIENT
Start: 2022-04-07 | End: 2022-04-09

## 2022-04-07 RX ORDER — ASPIRIN 81 MG/1
324 TABLET, CHEWABLE ORAL ONCE
Status: COMPLETED | OUTPATIENT
Start: 2022-04-07 | End: 2022-04-07

## 2022-04-07 RX ORDER — HEPARIN SODIUM 10000 [USP'U]/100ML
0-5000 INJECTION, SOLUTION INTRAVENOUS CONTINUOUS
Status: DISCONTINUED | OUTPATIENT
Start: 2022-04-07 | End: 2022-04-07 | Stop reason: ALTCHOICE

## 2022-04-07 RX ORDER — SIMVASTATIN 40 MG
40 TABLET ORAL AT BEDTIME
Status: DISCONTINUED | OUTPATIENT
Start: 2022-04-07 | End: 2022-04-22 | Stop reason: HOSPADM

## 2022-04-07 RX ORDER — ACETAMINOPHEN 500 MG
1000 TABLET ORAL EVERY 8 HOURS PRN
Status: DISCONTINUED | OUTPATIENT
Start: 2022-04-07 | End: 2022-04-22 | Stop reason: HOSPADM

## 2022-04-07 RX ORDER — NALOXONE HYDROCHLORIDE 0.4 MG/ML
0.4 INJECTION, SOLUTION INTRAMUSCULAR; INTRAVENOUS; SUBCUTANEOUS
Status: DISCONTINUED | OUTPATIENT
Start: 2022-04-07 | End: 2022-04-22 | Stop reason: HOSPADM

## 2022-04-07 RX ORDER — DOXYCYCLINE 100 MG/10ML
100 INJECTION, POWDER, LYOPHILIZED, FOR SOLUTION INTRAVENOUS ONCE
Status: COMPLETED | OUTPATIENT
Start: 2022-04-07 | End: 2022-04-07

## 2022-04-07 RX ORDER — METHYLPREDNISOLONE SODIUM SUCCINATE 40 MG/ML
40 INJECTION, POWDER, LYOPHILIZED, FOR SOLUTION INTRAMUSCULAR; INTRAVENOUS DAILY
Status: DISCONTINUED | OUTPATIENT
Start: 2022-04-08 | End: 2022-04-08

## 2022-04-07 RX ORDER — NALOXONE HYDROCHLORIDE 0.4 MG/ML
0.2 INJECTION, SOLUTION INTRAMUSCULAR; INTRAVENOUS; SUBCUTANEOUS
Status: DISCONTINUED | OUTPATIENT
Start: 2022-04-07 | End: 2022-04-22 | Stop reason: HOSPADM

## 2022-04-07 RX ORDER — PIPERACILLIN SODIUM, TAZOBACTAM SODIUM 3; .375 G/15ML; G/15ML
3.38 INJECTION, POWDER, LYOPHILIZED, FOR SOLUTION INTRAVENOUS EVERY 8 HOURS
Status: DISCONTINUED | OUTPATIENT
Start: 2022-04-08 | End: 2022-04-07

## 2022-04-07 RX ORDER — GABAPENTIN 300 MG/1
600 CAPSULE ORAL 2 TIMES DAILY
Status: DISCONTINUED | OUTPATIENT
Start: 2022-04-07 | End: 2022-04-07

## 2022-04-07 RX ORDER — IPRATROPIUM BROMIDE AND ALBUTEROL SULFATE 2.5; .5 MG/3ML; MG/3ML
3 SOLUTION RESPIRATORY (INHALATION) ONCE
Status: COMPLETED | OUTPATIENT
Start: 2022-04-07 | End: 2022-04-07

## 2022-04-07 RX ORDER — FLUTICASONE PROPIONATE 50 MCG
2 SPRAY, SUSPENSION (ML) NASAL AT BEDTIME
Status: ON HOLD | COMMUNITY
End: 2022-04-22

## 2022-04-07 RX ORDER — PIPERACILLIN SODIUM, TAZOBACTAM SODIUM 3; .375 G/15ML; G/15ML
3.38 INJECTION, POWDER, LYOPHILIZED, FOR SOLUTION INTRAVENOUS EVERY 12 HOURS
Status: DISCONTINUED | OUTPATIENT
Start: 2022-04-08 | End: 2022-04-08

## 2022-04-07 RX ORDER — SOLIFENACIN SUCCINATE 5 MG/1
5 TABLET, FILM COATED ORAL AT BEDTIME
Status: DISCONTINUED | OUTPATIENT
Start: 2022-04-07 | End: 2022-04-09

## 2022-04-07 RX ORDER — BENZONATATE 100 MG/1
100 CAPSULE ORAL 3 TIMES DAILY PRN
Status: DISCONTINUED | OUTPATIENT
Start: 2022-04-07 | End: 2022-04-22 | Stop reason: HOSPADM

## 2022-04-07 RX ORDER — IPRATROPIUM BROMIDE AND ALBUTEROL SULFATE 2.5; .5 MG/3ML; MG/3ML
3 SOLUTION RESPIRATORY (INHALATION) 4 TIMES DAILY
Status: DISCONTINUED | OUTPATIENT
Start: 2022-04-08 | End: 2022-04-10

## 2022-04-07 RX ORDER — CEFTRIAXONE 1 G/1
1 INJECTION, POWDER, FOR SOLUTION INTRAMUSCULAR; INTRAVENOUS ONCE
Status: COMPLETED | OUTPATIENT
Start: 2022-04-07 | End: 2022-04-07

## 2022-04-07 RX ORDER — ACETYLCYSTEINE 200 MG/ML
4 SOLUTION ORAL; RESPIRATORY (INHALATION)
Status: DISCONTINUED | OUTPATIENT
Start: 2022-04-07 | End: 2022-04-07

## 2022-04-07 RX ORDER — SODIUM CHLORIDE 9 MG/ML
INJECTION, SOLUTION INTRAVENOUS CONTINUOUS
Status: DISCONTINUED | OUTPATIENT
Start: 2022-04-07 | End: 2022-04-08

## 2022-04-07 RX ORDER — LIDOCAINE 40 MG/G
CREAM TOPICAL
Status: ACTIVE | OUTPATIENT
Start: 2022-04-07 | End: 2022-04-10

## 2022-04-07 RX ORDER — PIPERACILLIN SODIUM, TAZOBACTAM SODIUM 3; .375 G/15ML; G/15ML
3.38 INJECTION, POWDER, LYOPHILIZED, FOR SOLUTION INTRAVENOUS ONCE
Status: COMPLETED | OUTPATIENT
Start: 2022-04-07 | End: 2022-04-07

## 2022-04-07 RX ORDER — GABAPENTIN 300 MG/1
300 CAPSULE ORAL 2 TIMES DAILY
Status: DISCONTINUED | OUTPATIENT
Start: 2022-04-08 | End: 2022-04-09

## 2022-04-07 RX ORDER — DOXYCYCLINE 100 MG/10ML
100 INJECTION, POWDER, LYOPHILIZED, FOR SOLUTION INTRAVENOUS EVERY 12 HOURS
Status: DISCONTINUED | OUTPATIENT
Start: 2022-04-08 | End: 2022-04-12

## 2022-04-07 RX ORDER — ALBUTEROL SULFATE 0.83 MG/ML
2.5 SOLUTION RESPIRATORY (INHALATION)
Status: DISCONTINUED | OUTPATIENT
Start: 2022-04-07 | End: 2022-04-22 | Stop reason: HOSPADM

## 2022-04-07 RX ORDER — PIPERACILLIN SODIUM, TAZOBACTAM SODIUM 3; .375 G/15ML; G/15ML
3.38 INJECTION, POWDER, LYOPHILIZED, FOR SOLUTION INTRAVENOUS ONCE
Status: DISCONTINUED | OUTPATIENT
Start: 2022-04-07 | End: 2022-04-07

## 2022-04-07 RX ORDER — ACETYLCYSTEINE 200 MG/ML
4 SOLUTION ORAL; RESPIRATORY (INHALATION) 4 TIMES DAILY
Status: DISCONTINUED | OUTPATIENT
Start: 2022-04-08 | End: 2022-04-08

## 2022-04-07 RX ORDER — FAMOTIDINE 20 MG/1
20 TABLET, FILM COATED ORAL AT BEDTIME
Status: DISCONTINUED | OUTPATIENT
Start: 2022-04-07 | End: 2022-04-08

## 2022-04-07 RX ORDER — METHYLPREDNISOLONE SODIUM SUCCINATE 125 MG/2ML
125 INJECTION, POWDER, LYOPHILIZED, FOR SOLUTION INTRAMUSCULAR; INTRAVENOUS ONCE
Status: COMPLETED | OUTPATIENT
Start: 2022-04-07 | End: 2022-04-07

## 2022-04-07 RX ORDER — MIRABEGRON 25 MG/1
50 TABLET, FILM COATED, EXTENDED RELEASE ORAL EVERY EVENING
Status: DISCONTINUED | OUTPATIENT
Start: 2022-04-07 | End: 2022-04-08

## 2022-04-07 RX ORDER — TAMOXIFEN CITRATE 10 MG/1
20 TABLET ORAL EVERY EVENING
Status: DISCONTINUED | OUTPATIENT
Start: 2022-04-07 | End: 2022-04-22 | Stop reason: HOSPADM

## 2022-04-07 RX ORDER — TRAMADOL HYDROCHLORIDE 50 MG/1
50 TABLET ORAL 2 TIMES DAILY PRN
Status: DISCONTINUED | OUTPATIENT
Start: 2022-04-07 | End: 2022-04-08

## 2022-04-07 RX ORDER — IPRATROPIUM BROMIDE AND ALBUTEROL SULFATE 2.5; .5 MG/3ML; MG/3ML
3 SOLUTION RESPIRATORY (INHALATION) EVERY 4 HOURS
Status: DISCONTINUED | OUTPATIENT
Start: 2022-04-07 | End: 2022-04-07

## 2022-04-07 RX ORDER — GUAIFENESIN 600 MG/1
1200 TABLET, EXTENDED RELEASE ORAL 2 TIMES DAILY
Status: DISCONTINUED | OUTPATIENT
Start: 2022-04-07 | End: 2022-04-22 | Stop reason: HOSPADM

## 2022-04-07 RX ORDER — HEPARIN SODIUM 10000 [USP'U]/100ML
0-5000 INJECTION, SOLUTION INTRAVENOUS CONTINUOUS
Status: DISCONTINUED | OUTPATIENT
Start: 2022-04-07 | End: 2022-04-07

## 2022-04-07 RX ADMIN — TAMOXIFEN CITRATE 20 MG: 10 TABLET ORAL at 21:27

## 2022-04-07 RX ADMIN — SODIUM CHLORIDE: 9 INJECTION, SOLUTION INTRAVENOUS at 21:27

## 2022-04-07 RX ADMIN — UMECLIDINIUM 1 PUFF: 62.5 AEROSOL, POWDER ORAL at 22:28

## 2022-04-07 RX ADMIN — IPRATROPIUM BROMIDE AND ALBUTEROL SULFATE 3 ML: 2.5; .5 SOLUTION RESPIRATORY (INHALATION) at 13:21

## 2022-04-07 RX ADMIN — RIVAROXABAN 15 MG: 15 TABLET, FILM COATED ORAL at 21:26

## 2022-04-07 RX ADMIN — MIRABEGRON 50 MG: 25 TABLET, FILM COATED, EXTENDED RELEASE ORAL at 21:26

## 2022-04-07 RX ADMIN — FAMOTIDINE 20 MG: 20 TABLET ORAL at 21:26

## 2022-04-07 RX ADMIN — GUAIFENESIN 1200 MG: 600 TABLET ORAL at 21:26

## 2022-04-07 RX ADMIN — SIMVASTATIN 40 MG: 40 TABLET, FILM COATED ORAL at 21:26

## 2022-04-07 RX ADMIN — HEPARIN SODIUM 900 UNITS/HR: 10000 INJECTION, SOLUTION INTRAVENOUS at 14:50

## 2022-04-07 RX ADMIN — ACETYLCYSTEINE 4 ML: 200 SOLUTION ORAL; RESPIRATORY (INHALATION) at 21:46

## 2022-04-07 RX ADMIN — ASPIRIN 81 MG CHEWABLE TABLET 324 MG: 81 TABLET CHEWABLE at 14:28

## 2022-04-07 RX ADMIN — SOLIFENACIN SUCCINATE 5 MG: 5 TABLET, FILM COATED ORAL at 21:26

## 2022-04-07 RX ADMIN — PIPERACILLIN AND TAZOBACTAM 3.38 G: 3; .375 INJECTION, POWDER, LYOPHILIZED, FOR SOLUTION INTRAVENOUS at 21:37

## 2022-04-07 RX ADMIN — DOXYCYCLINE 100 MG: 100 INJECTION, POWDER, LYOPHILIZED, FOR SOLUTION INTRAVENOUS at 15:42

## 2022-04-07 RX ADMIN — SODIUM CHLORIDE 500 ML: 9 INJECTION, SOLUTION INTRAVENOUS at 14:43

## 2022-04-07 RX ADMIN — METHYLPREDNISOLONE SODIUM SUCCINATE 125 MG: 125 INJECTION, POWDER, FOR SOLUTION INTRAMUSCULAR; INTRAVENOUS at 14:29

## 2022-04-07 RX ADMIN — IPRATROPIUM BROMIDE AND ALBUTEROL SULFATE 3 ML: 2.5; .5 SOLUTION RESPIRATORY (INHALATION) at 21:46

## 2022-04-07 RX ADMIN — FUROSEMIDE 40 MG: 40 TABLET ORAL at 21:26

## 2022-04-07 RX ADMIN — FLUTICASONE FUROATE AND VILANTEROL TRIFENATATE 1 PUFF: 200; 25 POWDER RESPIRATORY (INHALATION) at 22:28

## 2022-04-07 RX ADMIN — KIT FOR THE PREPARATION OF TECHNETIUM TC 99M ALBUMIN AGGREGATED 7.9 MILLICURIE: 2.5 INJECTION, POWDER, FOR SOLUTION INTRAVENOUS at 17:18

## 2022-04-07 RX ADMIN — GABAPENTIN 600 MG: 300 CAPSULE ORAL at 21:26

## 2022-04-07 RX ADMIN — CEFTRIAXONE 1 G: 1 INJECTION, POWDER, FOR SOLUTION INTRAMUSCULAR; INTRAVENOUS at 14:39

## 2022-04-07 ASSESSMENT — ENCOUNTER SYMPTOMS
DIARRHEA: 0
NAUSEA: 0
VOMITING: 0
HEMATURIA: 0
MYALGIAS: 0
NERVOUS/ANXIOUS: 0
ABDOMINAL PAIN: 0
DYSURIA: 0
SORE THROAT: 0
SHORTNESS OF BREATH: 1
CHILLS: 0
FREQUENCY: 0
FEVER: 0
LIGHT-HEADEDNESS: 0
COUGH: 1
HEADACHES: 0

## 2022-04-07 ASSESSMENT — ACTIVITIES OF DAILY LIVING (ADL)
ADLS_ACUITY_SCORE: 12
DIFFICULTY_EATING/SWALLOWING: NO
DEPENDENT_IADLS:: CLEANING;COOKING;LAUNDRY;SHOPPING;MEAL PREPARATION;MEDICATION MANAGEMENT;TRANSPORTATION;MONEY MANAGEMENT
DOING_ERRANDS_INDEPENDENTLY_DIFFICULTY: YES
DRESSING/BATHING_DIFFICULTY: NO
DIFFICULTY_COMMUNICATING: NO
ADLS_ACUITY_SCORE: 9
WALKING_OR_CLIMBING_STAIRS_DIFFICULTY: NO
WEAR_GLASSES_OR_BLIND: YES
EQUIPMENT_CURRENTLY_USED_AT_HOME: CANE, STRAIGHT;WALKER, ROLLING
CHANGE_IN_FUNCTIONAL_STATUS_SINCE_ONSET_OF_CURRENT_ILLNESS/INJURY: NO
ADLS_ACUITY_SCORE: 5
TOILETING_ISSUES: NO
HEARING_DIFFICULTY_OR_DEAF: NO
FALL_HISTORY_WITHIN_LAST_SIX_MONTHS: NO
ADLS_ACUITY_SCORE: 9
ADLS_ACUITY_SCORE: 9
CONCENTRATING,_REMEMBERING_OR_MAKING_DECISIONS_DIFFICULTY: NO

## 2022-04-07 NOTE — H&P
[facility]    History and Physical - Hospitalist Service       Date of Admission:  4/7/2022    Assessment & Plan      Irene León is a 77 year old female admitted on 4/7/2022. She HLD, CHF, COPD, HTN, Factor V Leiden on Xarelto, CKD, Osteoporosis and is admitted for chest tightness and shortness of breath.     Acute Hypoxic Respiratory Failure  COPD Exacerbation  PFT in 2019 showed severe obstructive ventilatory defect with elements of reversibility. Hyperinflation and severely decreased diffusion capacity.  - O2 via nasal cannula goal > 88% SpO2  - Continuous pulse ox monitoring  - RT consult   - Scheduled Duonebs Q4hrs  - PRN Albuterol Nebs Q2Hrs  - Continue Trelegy Ellipta 1 puff at bedtime  - Daily IV Solumedrol 40 mg daily  - Mucomyst Q4H w/ Albuterol nebs  - Mucinex BID   - Tessalon PRN  - AM BMP, CBC, CRP  PNA   Radiographic evidence on VQ and CXR left mid to upper lung suggestive of pneumonia with a Leukocytosis 36.5 with an ANC of 31.8 and elevation in CRP 36.9. Initial concern for shock with severe hypotension.   - Broaden coverage to IV Zosyn and IV Doxycycline  - Sputum gram stain and culture if able to obtain   - Added Procalc, Lactic, BCs  - Infectious disease consult  - PRN Acetaminophen    Hx of Factor V Leiden  Chronic perfusion abnormalities throughout both lungs on VQ scan  Chronic perfusion abnormalities throughout both lungs similar to the previous exam. No new perfusion abnormalities. Recent chest x-ray is suggestive of left mid and upper lung pneumonia. No lower extremity DVT on US.  - Discontinue heparin drip  - Restart Xarelto 15 mg daily    Dynamic EKG Changes  Hx of CHF   Last echo 7/28/2021 55-60%. Significant interval changes since last EKG with non-specific ST changes and unstable baseline. Could be secondary to artifact.  - Continuous Cardiac Monitoring  - Lasix 40 mg BID  - Daily weights  - Track Is and Os  - Serum Troponin x3 Q4H  - Complete  Echocardiogram  - May need stress test at some point.    Anemia   Hgb of 10.9 on admission baseline is variable but does appear to have some element of chronic anemia in Hgbs 13.3 - 9.8 in the last two months. No signs of acute bleed.  - Trend CBCs    ANATOLIY on CKD  Cr 2.44 BUN 36 with a variable baseline 1.5-2.0. Appears dry on exam.  - mIVF 100 ml/hr NS    Chronic Conditions and Medications  Breast Cancer  DCIS, ER/AR positive, HER-2 negative, s/p left breast lumpectomy and left axillary sentinel lymph node biopsy.  - Cont. Tamoxifen 20 daily  Osteoporosis  - Held VitD and Ca supplementation  Allergies  - Held Cetirizine and Flonase  GERD  - Cont. 20 mg pepcid at bedtime  Chronic Pain secondary to T12 compression fracture  - Gabapentin 600 mg BID   - Cont. Tramadol 50 mg BID PRN  Hx of hypokalemia and hypomagnesemia  - RN driven replacement protocol  Overactive bladder  - Cont. Mirabegron 50 mg and Solifenacin 5 mg       Diet: Renal Diet (non-dialysis)    DVT Prophylaxis: DOAC  Hayes Catheter: Not present  Fluids: 100 ml/hr NS  Central Lines: PRESENT  PICC Triple Lumen 04/07/22 Right Basilic-Site Assessment: WDL  Cardiac Monitoring: None  Code Status:   Full    Clinically Significant Risk Factors Present on Admission               # Coagulation Defect: home medication list includes an anticoagulant medication        Disposition Plan   Expected Discharge: Pending improvement  Anticipated discharge location:  Awaiting care coordination huddle  Delays: TBD       The patient's care was discussed with the Attending Physician, Dr. Puri.    Herman Hill MD  Hospitalist Service  [facility]  Securely message with the Vocera Web Console (learn more here)  Text page via [employer] Paging/Directory       ______________________________________________________________________    Chief Complaint   Shortness of breath, chest pain    History is obtained from the patient    History of Present Illness    Irene León is a 77 year old female who has a history of HLD, CHF, COPD, HTN, Factor V Leiden on Xarelto, CKD, Osteoporosis and is admitted for chest tightness and shortness of breath.     Symptoms started 2-3 days ago had increased shortness of breath and dyspnea with exertion, she has had a new productive cough for the last few days. She also endorses chest congestion denies that it feels like chest pain. Endorses intermittent chills but denies fevers, sore throat, headache, nausea, vomiting, abdominal pain or sick contacts. She does endorse lower extremity swelling but no noticeable weight changes. She admits she is not always adherent to her Xarelto because she has bruises over her shins from hitting furniture but does take her other medications as prescribed.      Review of Systems    The 10 point Review of Systems is negative other than noted in the HPI or here.    Past Medical History    I have reviewed this patient's medical history and updated it with pertinent information if needed.   Past Medical History:   Diagnosis Date     ANATOLIY (acute kidney injury) (H)      Allergic rhinitis      Arrhythmia      Atrial fibrillation with RVR (H)      Bacteremia      Breast cancer (H) 2017     Cardiomyopathy (H)      Centrilobular emphysema (H)      CHF (congestive heart failure) (H)      CKD (chronic kidney disease)      COPD (chronic obstructive pulmonary disease) (H)      Coronary artery disease      Depression      Dysphagia      E. coli sepsis (H)      Factor 5 Leiden mutation, heterozygous (H)      GERD (gastroesophageal reflux disease)      Hx of radiation therapy 2017     Hyperlipidemia      Hypertension      Hypokalemia      Hypomagnesemia      Idiopathic cardiomyopathy (H)      Left hip pain 4/30/2014     OAB (overactive bladder)      Osteoporosis      Sacral insufficiency fracture      Sinusitis      Syncope      Urge incontinence      Vocal cord dysfunction       Past Surgical History   I have reviewed  this patient's surgical history and updated it with pertinent information if needed.  Past Surgical History:   Procedure Laterality Date     ARTHROPLASTY REVISION HIP Left      BIOPSY BREAST Right 2017     BLADDER SURGERY      bladder interstim with removal     CARDIAC CATHETERIZATION       CATARACT EXTRACTION Left 07/18/2017     IR ABDOMINAL AORTOGRAM  4/16/2003     IR AORTIC ARCH 4 VESSEL ANGIOGRAM  4/16/2003     IR MISCELLANEOUS PROCEDURE  4/16/2003     LUMPECTOMY BREAST Left 06/2017    x2     PICC TRIPLE LUMEN PLACEMENT  4/7/2022          ZZC OPEN TX FEMORAL FRACTURE DISTAL MED/LAT CONDYLE Left 10/28/2015    Procedure: OPEN REDUCTION INTERNAL FIXATION LEFT  PROXIMAL FEMUR PERIPROSTHETIC FRACTURE;  Surgeon: Yovanny Albarran MD;  Location: [facility];  Service: Orthopedics     Social History   I have reviewed this patient's social history and updated it with pertinent information if needed. Irene León  reports that she quit smoking about 14 years ago. She quit smokeless tobacco use about 17 years ago. She reports that she does not drink alcohol and does not use drugs.    Family History   I have reviewed this patient's family history and updated it with pertinent information if needed.  Family History   Problem Relation Age of Onset     Osteoporosis Other      Prostate Cancer Brother      Breast Cancer Maternal Aunt         age thought to be in her 70's-80's     Prostate Cancer Maternal Uncle        Prior to Admission Medications   Prior to Admission Medications   Prescriptions Last Dose Informant Patient Reported? Taking?   EPINEPHrine (EPIPEN/ADRENACLICK/AUVI-Q) 0.3 mg/0.3 mL injection Unknown at Unknown time  Yes Yes   Sig: Inject 0.3 mg into the muscle as needed for anaphylaxis    Fluticasone-Umeclidin-Vilanterol (TRELEGY ELLIPTA) 200-62.5-25 MCG/INH oral inhaler 4/6/2022 at PM, not bringing  Yes Yes   Sig: Inhale 1 puff into the lungs At Bedtime   acetaminophen (TYLENOL) 500 MG tablet 4/7/2022 at  AM  Yes Yes   Sig: Take 1,000 mg by mouth every 8 hours as needed for mild pain or fever    albuterol (PROAIR HFA/PROVENTIL HFA/VENTOLIN HFA) 108 (90 Base) MCG/ACT inhaler Unknown at not bringing  No Yes   Sig: Inhale 2 puffs into the lungs every 4 hours as needed for shortness of breath / dyspnea   ascorbic acid, vitamin C, (VITAMIN C) 1000 MG tablet 4/6/2022 at Unknown time  Yes Yes   Sig: [ASCORBIC ACID, VITAMIN C, (VITAMIN C) 1000 MG TABLET] Take 1,000 mg by mouth daily. Airborne 1 tab daily   benzonatate (TESSALON) 100 MG capsule Unknown at Unknown time  No Yes   Sig: Take 1 capsule (100 mg) by mouth 3 times daily as needed for cough   cetirizine (ZYRTEC) 10 MG tablet Unknown at Unknown time  Yes Yes   Sig: Take 10 mg by mouth daily as needed for allergies    chlorhexidine (PERIDEX) 0.12 % solution Unknown at Unknown time  Yes Yes   Sig: [CHLORHEXIDINE (PERIDEX) 0.12 % SOLUTION] Apply 15 mL to the mouth or throat at bedtime as needed.    cholecalciferol, vitamin D3, 1,000 unit tablet 4/6/2022 at Unknown time  Yes Yes   Sig: [CHOLECALCIFEROL, VITAMIN D3, 1,000 UNIT TABLET] Take 1,000 Units by mouth daily.   famotidine (PEPCID) 20 MG tablet 4/6/2022 at PM  No Yes   Sig: Take 1 tablet (20 mg) by mouth 2 times daily   Patient taking differently: Take 20 mg by mouth At Bedtime    fluticasone (FLONASE) 50 MCG/ACT nasal spray 4/6/2022 at PM, not bringing  Yes Yes   Sig: Spray 2 sprays into both nostrils At Bedtime   furosemide (LASIX) 40 MG tablet 4/6/2022 at 1200  No Yes   Sig: Take 1 tablet (40 mg) by mouth 2 times daily   gabapentin (NEURONTIN) 300 MG capsule 4/6/2022 at PM  No Yes   Sig: Take 2 capsules (600 mg) by mouth 2 times daily   ipratropium (ATROVENT HFA) 17 MCG/ACT inhaler 4/6/2022 at not bringing  No Yes   Sig: [ATROVENT HFA 17 MCG/ACTUATION INHALER] USE 2 INHALATIONS EVERY 6 HOURS   Patient taking differently: Inhale 2 puffs into the lungs every 6 hours as needed for wheezing [ATROVENT HFA 17  MCG/ACTUATION INHALER] USE 2 INHALATIONS EVERY 6 HOURS   ipratropium-albuteroL (DUO-NEB) 0.5-2.5 mg/3 mL nebulizer Unknown at Unknown time  No Yes   Sig: [IPRATROPIUM-ALBUTEROL (DUO-NEB) 0.5-2.5 MG/3 ML NEBULIZER] Inhale 3 mL 4 (four) times a day.   Patient taking differently: Inhale 3 mLs into the lungs 4 times daily as needed for shortness of breath / dyspnea    loperamide (IMODIUM) 2 mg capsule Unknown at Unknown time  Yes Yes   Sig: Take 2-4 mg by mouth 4 times daily as needed for diarrhea    magnesium chloride (SLOW-MAG) 64 mg TbEC delayed-release tablet 4/6/2022 at Unknown time  No Yes   Sig: [MAGNESIUM CHLORIDE (SLOW-MAG) 64 MG TBEC DELAYED-RELEASE TABLET] Take 1 tablet (64 mg total) by mouth daily.   metoprolol succinate ER (TOPROL-XL) 50 MG 24 hr tablet 4/6/2022 at PM  No Yes   Sig: Take 1.5 tablets (75 mg) by mouth At Bedtime   mirabegron (MYRBETRIQ) 50 MG 24 hr tablet 4/6/2022 at PM  No Yes   Sig: [MYRBETRIQ 50 MG TB24 ER TABLET] TAKE 1 TABLET DAILY   potassium chloride ER (K-TAB/KLOR-CON) 10 MEQ CR tablet 4/6/2022 at 1200  No Yes   Sig: Take 1 tablet (10 mEq) by mouth 2 times daily   rivaroxaban ANTICOAGULANT (XARELTO) 15 MG TABS tablet 4/5/2022 at PM  No Yes   Sig: Take 1 tablet (15 mg) by mouth At Bedtime   simvastatin (ZOCOR) 40 MG tablet 4/6/2022 at PM  No Yes   Sig: Take 1 tablet (40 mg) by mouth At Bedtime   solifenacin (VESICARE) 5 MG tablet 4/6/2022 at PM  No Yes   Sig: Take 1 tablet (5 mg) by mouth At Bedtime   tamoxifen (NOLVADEX) 20 MG tablet 4/6/2022 at PM  No Yes   Sig: TAKE 1 TABLET DAILY   traMADol (ULTRAM) 50 MG tablet Unknown at Unknown time  No Yes   Sig: Take 1 tablet (50 mg) by mouth 2 times daily as needed for severe pain      Facility-Administered Medications: None     Allergies   Allergies   Allergen Reactions     Sulfa (Sulfonamide Antibiotics) [Sulfa Drugs] Rash     Headaches and dizziness.     Homeopathic Drugs [External Allergen Needs Reconciliation - See Comment] Unknown and  Other (See Comments)     Comment: runny nose, Comment: runny nose     Mold Extracts [Molds & Smuts] Unknown     Morphine (Pf) [Morphine] Unknown     hallucinate     Lisinopril Cough, Unknown and Itching     Comment: cough, Comment: cough     Sulfacetamide Sodium [Sulfacetamide] Rash       Physical Exam   Vital Signs: Temp: 98.2  F (36.8  C) Temp src: Oral BP: 131/67 Pulse: 93   Resp: 26 SpO2: 97 % O2 Device: Nasal cannula Oxygen Delivery: 2 LPM  Weight: 110 lbs .15 oz    Constitutional: awake, alert, cooperative, in mild distress  Eyes: Lids and lashes normal, pupils equal, round and reactive to light, extra ocular muscles intact, sclera clear, conjunctiva normal  ENT: Normocephalic, without obvious abnormality, atraumatic  Respiratory: Moderate respiratory distress, decreased breath sounds bilaterally, no retractions and rhonchi diffuse, wheeze diffuse  Cardiovascular: Normal apical impulse, regular rate and rhythm, normal S1 and S2, no S3 or S4, and no murmur noted  GI: No scars, normal bowel sounds, soft, non-distended, non-tender, no masses palpated, no hepatosplenomegally  Skin:  ecchymosis on right and left lower leg(s) no redness, warmth, or swelling   Musculoskeletal: There is no redness, warmth, or swelling of the joints.  Full range of motion noted.  Motor strength is 5 out of 5 all extremities bilaterally.  Tone is normal. 1+ pitting edema bilateral lower extremities to ankles.  Neurologic: Awake, alert, oriented to name, place and time.   Neuropsychiatric: General: normal, calm and normal eye contact  Level of consciousness: alert / normal  Affect: normal    Data   Data reviewed today: I reviewed all medications, new labs and imaging results over the last 24 hours. I personally reviewed the EKG tracing showing NSR with frequent PVCs, non-specific ST deviations and widened QRS complex.    Recent Labs   Lab 04/07/22  1437   WBC 36.5*   HGB 10.9*   MCV 86         POTASSIUM 3.5   CHLORIDE 103    CO2 20*   BUN 36*   CR 2.44*   ANIONGAP 13   MESSI 8.5   GLC 89     Recent Results (from the past 24 hour(s))   US Lower Extremity Venous Duplex Bilateral    Narrative    EXAM: US LOWER EXTREMITY VENOUS DUPLEX BILATERAL  LOCATION: [facility]  DATE/TIME: 4/7/2022 3:49 PM    INDICATION: hypoxia, hypotension; leg swelling  COMPARISON: None.  TECHNIQUE: Venous Duplex ultrasound of bilateral lower extremities with and without compression, augmentation and duplex. Color flow and spectral Doppler with waveform analysis performed.    FINDINGS: Exam includes the common femoral, femoral, popliteal veins as well as segmentally visualized deep calf veins and greater saphenous vein.     RIGHT: No deep vein thrombosis. No superficial thrombophlebitis. No popliteal cyst.    LEFT: No deep vein thrombosis. No superficial thrombophlebitis. No popliteal cyst.      Impression    IMPRESSION:  1.  No deep venous thrombosis in the bilateral lower extremities.   XR Chest Port 1 View    Narrative    EXAM: XR CHEST PORT 1 VIEW  LOCATION: [facility]  DATE/TIME: 4/7/2022 4:49 PM    INDICATION: Cough.  COMPARISON: 06/02/2021.      Impression    IMPRESSION:     New moderate opacity in the left mid to upper lung suggestive of pneumonia given history of cough. However, this requires follow-up to complete resolution to exclude an underlying lesion (6-8 week follow-up radiographs).    Right PICC tip near the cavoatrial junction.    Normal heart size. Atherosclerosis.    No significant pleural effusion.       NM Lung Scan Perfusion Particulate    Narrative    EXAM: NM LUNG SCAN PERFUSION PARTICULATE  LOCATION: [facility]  DATE/TIME: 4/7/2022 5:18 PM    INDICATION: PE suspected, high prob  COMPARISON: Chest x-ray 04/07/2022, V/Q scan 06/24/2020, CT chest exam 09/13/2019  TECHNIQUE: 7.9 mCi technetium-99m MAA, IV. Standard lung perfusion imaging.    FINDINGS: Heterogeneous  perfusion throughout the left lung with numerous small subsegmental perfusion defects similar to the previous VQ scan. Perfusion to the right lung is more homogeneous, with a few subtle areas of decreased activity also similar to   the prior exam.    Chest x-ray from today demonstrates emphysema with diffuse opacities in the left upper and mid left lung suggestive of pneumonia.      Impression    IMPRESSION:   1.  Chronic perfusion abnormalities throughout both lungs similar to the previous exam. No new perfusion abnormalities. Recent chest x-ray is suggestive of left mid and upper lung pneumonia.

## 2022-04-07 NOTE — PHARMACY-ADMISSION MEDICATION HISTORY
Pharmacy Note - Admission Medication History    Pertinent Provider Information: Pt reports that she bruises easily and when this happens she holds her Xarelto a few days. Pt has not taken in ~2 days.      ______________________________________________________________________    Prior To Admission (PTA) med list completed and updated in EMR.       PTA Med List   Medication Sig Note Last Dose     acetaminophen (TYLENOL) 500 MG tablet Take 1,000 mg by mouth every 8 hours as needed for mild pain or fever   4/7/2022 at AM     albuterol (PROAIR HFA/PROVENTIL HFA/VENTOLIN HFA) 108 (90 Base) MCG/ACT inhaler Inhale 2 puffs into the lungs every 4 hours as needed for shortness of breath / dyspnea  Unknown at not bringing     ascorbic acid, vitamin C, (VITAMIN C) 1000 MG tablet [ASCORBIC ACID, VITAMIN C, (VITAMIN C) 1000 MG TABLET] Take 1,000 mg by mouth daily. Airborne 1 tab daily  4/6/2022 at Unknown time     benzonatate (TESSALON) 100 MG capsule Take 1 capsule (100 mg) by mouth 3 times daily as needed for cough  Unknown at Unknown time     cetirizine (ZYRTEC) 10 MG tablet Take 10 mg by mouth daily as needed for allergies   Unknown at Unknown time     chlorhexidine (PERIDEX) 0.12 % solution [CHLORHEXIDINE (PERIDEX) 0.12 % SOLUTION] Apply 15 mL to the mouth or throat at bedtime as needed.   Unknown at Unknown time     cholecalciferol, vitamin D3, 1,000 unit tablet [CHOLECALCIFEROL, VITAMIN D3, 1,000 UNIT TABLET] Take 1,000 Units by mouth daily.  4/6/2022 at Unknown time     EPINEPHrine (EPIPEN/ADRENACLICK/AUVI-Q) 0.3 mg/0.3 mL injection Inject 0.3 mg into the muscle as needed for anaphylaxis   Unknown at Unknown time     famotidine (PEPCID) 20 MG tablet Take 1 tablet (20 mg) by mouth 2 times daily (Patient taking differently: Take 20 mg by mouth At Bedtime )  4/6/2022 at PM     fluticasone (FLONASE) 50 MCG/ACT nasal spray Spray 2 sprays into both nostrils At Bedtime  4/6/2022 at PM, not bringing      Fluticasone-Umeclidin-Vilanterol (TRELEGY ELLIPTA) 200-62.5-25 MCG/INH oral inhaler Inhale 1 puff into the lungs At Bedtime  4/6/2022 at PM, not bringing     furosemide (LASIX) 40 MG tablet Take 1 tablet (40 mg) by mouth 2 times daily  4/6/2022 at 1200     gabapentin (NEURONTIN) 300 MG capsule Take 2 capsules (600 mg) by mouth 2 times daily  4/6/2022 at PM     ipratropium (ATROVENT HFA) 17 MCG/ACT inhaler [ATROVENT HFA 17 MCG/ACTUATION INHALER] USE 2 INHALATIONS EVERY 6 HOURS (Patient taking differently: Inhale 2 puffs into the lungs every 6 hours as needed for wheezing [ATROVENT HFA 17 MCG/ACTUATION INHALER] USE 2 INHALATIONS EVERY 6 HOURS) 4/7/2022: Pt reports taking ~4x/day on average. 4/6/2022 at not bringing     ipratropium-albuteroL (DUO-NEB) 0.5-2.5 mg/3 mL nebulizer [IPRATROPIUM-ALBUTEROL (DUO-NEB) 0.5-2.5 MG/3 ML NEBULIZER] Inhale 3 mL 4 (four) times a day. (Patient taking differently: Inhale 3 mLs into the lungs 4 times daily as needed for shortness of breath / dyspnea )  Unknown at Unknown time     loperamide (IMODIUM) 2 mg capsule Take 2-4 mg by mouth 4 times daily as needed for diarrhea   Unknown at Unknown time     magnesium chloride (SLOW-MAG) 64 mg TbEC delayed-release tablet [MAGNESIUM CHLORIDE (SLOW-MAG) 64 MG TBEC DELAYED-RELEASE TABLET] Take 1 tablet (64 mg total) by mouth daily.  4/6/2022 at Unknown time     metoprolol succinate ER (TOPROL-XL) 50 MG 24 hr tablet Take 1.5 tablets (75 mg) by mouth At Bedtime  4/6/2022 at PM     mirabegron (MYRBETRIQ) 50 MG 24 hr tablet [MYRBETRIQ 50 MG TB24 ER TABLET] TAKE 1 TABLET DAILY  4/6/2022 at PM     potassium chloride ER (K-TAB/KLOR-CON) 10 MEQ CR tablet Take 1 tablet (10 mEq) by mouth 2 times daily  4/6/2022 at 1200     rivaroxaban ANTICOAGULANT (XARELTO) 15 MG TABS tablet Take 1 tablet (15 mg) by mouth At Bedtime 4/7/2022: Pt reports holding a few doses when she has bruising.  4/5/2022 at PM     simvastatin (ZOCOR) 40 MG tablet Take 1 tablet (40 mg) by  mouth At Bedtime  4/6/2022 at PM     solifenacin (VESICARE) 5 MG tablet Take 1 tablet (5 mg) by mouth At Bedtime  4/6/2022 at PM     tamoxifen (NOLVADEX) 20 MG tablet TAKE 1 TABLET DAILY  4/6/2022 at PM     traMADol (ULTRAM) 50 MG tablet Take 1 tablet (50 mg) by mouth 2 times daily as needed for severe pain  Unknown at Unknown time       Information source(s): Patient and CareEverywhere/SureScripts  Method of interview communication: in-person    Summary of Changes to PTA Med List  New: Flonase  Discontinued: Astelin  Changed: Tylenol prn, Zyrtec prn, Atrovent HFA prn, Duo-neb prn    Patient was asked about OTC/herbal products specifically.  PTA med list reflects this.    In the past week, patient estimated taking medication this percent of the time:  50-90% due to other.    Allergies were reviewed, assessed, and updated with the patient.      Patient did not bring any medications to the hospital and can't retrieve from home. No multi-dose medications are available for use during hospital stay.     The information provided in this note is only as accurate as the sources available at the time of the update(s).    Thank you for the opportunity to participate in the care of this patient.    Santiago Huertas [facility]  4/7/2022 3:14 PM

## 2022-04-07 NOTE — ED PROVIDER NOTES
Emergency Department Encounter   NAME: Irene León ; AGE: 77 year old female ; YOB: 1945 ; MRN: [id number] ; EVALUATION DATE & TIME: 4/7/2022 12:45 PM ; PCP: Pankaj Montanez   ED PROVIDER: Ana العراقي PA-C    Chief Complaint   Patient presents with     Copd Exacerbation       Medical Decision Making & Final Diagnosis     1. COPD exacerbation (H)    2. Pneumonia due to infectious organism, unspecified laterality, unspecified part of lung         ED Course as of 04/07/22 1934   Thu Apr 07, 2022 1928 Irene is a 77 year old female with a relevant PMH of HLD, breast CA, CHF, COPD, HTN, factor V Leiden on Xarelto, CKD, osteoporosis who presents to the ED for evaluation of shortness of breath and chest pain.    1928 My exam is notable for BP 78/47, mild increased work of breathing, mild tachypnea, oxygen saturation 92% on 3 L by nasal cannula and otherwise normal vital signs in a chronically ill-appearing woman.  Decreased breath sounds bilaterally with expiratory wheeze in lower lung fields.  1+ pitting edema bilaterally L> R.  No anterior chest wall TTP.   1929 EKG reveals sinus rhythm with PVCs.  Incomplete left bundle branch block.  No acute ST elevation or depression.  No pathologic arrhythmia.  When compared to prior EKG no significant change from baseline.  Labs notable for leukocytosis 36.5 with left shift, hemoglobin 10.9 down from 1310 months ago, , VBG is grossly unremarkable, ANATOLIY with creatinine 2.44 and BUN 36, and initial troponin 0 0.03.    1931 Patient has a history of factor V Leiden and is noncompliant with her Xarelto.  She got a history of heart failure and is noncompliant with Lasix.  Exam most consistent with COPD exacerbation and possible pneumonia.  She did endorse exertional chest pain on arrival so she was given aspirin.  Initial troponin is reassuring and I suspect alternative source of exertional and pleuritic chest pain.  Will trend troponins.  Given history of  factor V Leiden, pleuritic chest pain, contraindication to CTA and length of time it will likely take to get a VQ scan I did elect to heparinize her at arrival.  Discussed risk and benefit with the patient and her daughter at the bedside and they are agreeable to this.  She was also given Solu-Medrol, duo nebs, ceftriaxone, doxycycline on arrival with concern for COPD exacerbation.  Chest x-ray shows focal pneumonia.  VQ scan without evidence of PE and also concerning for pneumonia.  Delta troponin pending.  Given patient's increased oxygen needs, chest pain, and poor compliance I do think she benefit from inpatient admission for further management especially given hypotension on arrival.  Discussed this with the patient and her daughter and they are agreeable to this plan.  Discussed with admitting physician Dr. Hill.           Critical Care:  Performed by: Ana العراقي PA-C  Authorized by: Ana العراقي PA-C    Total critical care time: 30 minutes  Critical care time was exclusive of separately billable procedures and treating other patients.  Critical care was necessary to treat or prevent imminent or life-threatening deterioration of the following conditions: acute on chronic hypoxic respiratory failure, severe sepsis  Critical care was time spent personally by me on the following activities: development of treatment plan with patient or surrogate, discussions with consultants, examination of patient, evaluation of patient's response to treatment, obtaining history from patient or surrogate, ordering and performing treatments and interventions, ordering and review of laboratory studies, ordering and review of radiographic studies and re-evaluation of patient's condition, this excludes any separately billable procedures.    ED Course   1:05 PM I met and introduced myself to the patient. I gathered initial history and performed my physical exam. We discussed plan for initial workup.   I did see the patient while  wearing full COVID-compliant PPE.  2:13 PM Staffed with Dr. Jo  2:40 PM reevaluated the patient.  She appears more comfortable and is breathing easier after nebs.  Blood pressure 130 mmHg systolic.  4:54 PM   6:13 PM Discussed with Dr. Hill who is agreeable to admission  6:30 PM Updated patient's daughter Leelee over the phone  MEDICATIONS GIVEN IN THE EMERGENCY:   Medications   lidocaine 1 % 0.1-5 mL (has no administration in time range)   lidocaine (LMX4) cream (has no administration in time range)   sodium chloride (PF) 0.9% PF flush 10-40 mL (has no administration in time range)   heparin 25,000 units in 0.45% NaCl 250 mL ANTICOAGULANT infusion (900 Units/hr Intravenous Rate/Dose Verify 4/7/22 1826)   aspirin (ASA) chewable tablet 324 mg (324 mg Oral Given 4/7/22 1428)   ipratropium - albuterol 0.5 mg/2.5 mg/3 mL (DUONEB) neb solution 3 mL (3 mLs Nebulization Given 4/7/22 1321)   ipratropium - albuterol 0.5 mg/2.5 mg/3 mL (DUONEB) neb solution 3 mL (3 mLs Nebulization Given 4/7/22 1321)   methylPREDNISolone sodium succinate (solu-MEDROL) injection 125 mg (125 mg Intravenous Given 4/7/22 1429)   doxycycline (VIBRAMYCIN) 100 mg vial to attach to  mL bag (0 mg Intravenous Stopped 4/7/22 1651)   cefTRIAXone (ROCEPHIN) 1 g vial to attach to  mL bag for ADULTS or NS 50 mL bag for PEDS (0 g Intravenous Stopped 4/7/22 1532)   0.9% sodium chloride BOLUS (0 mLs Intravenous Stopped 4/7/22 1825)   heparin ANTICOAGULANT Loading dose for HIGH INTENSITY TREATMENT * Give BEFORE starting heparin infusion (4,000 Units Intravenous Given 4/7/22 1452)   technetium pertechnetate with albumin (Tc99m MAA) radioisotope injection 6-9 millicurie (7.9 millicuries Intravenous Given 4/7/22 1718)      NEW PRESCRIPTIONS STARTED AT TODAY'S ER VISIT:  New Prescriptions    No medications on file     =================================================================   History   Patient information was obtained from: patient and  patient's daughter   Use of Intrepreter: N/A    Irene León is a 77 year old female with a relevant PMH of HLD, breast CA, CHF, COPD, HTN, factor V Leiden on Xarelto, CKD, osteoporosis who presents to the ED for evaluation of shortness of breath and chest pain.   Pt reports exertional and pleuritic L sided chest pain for 2-3 days. Increasing shortness of breath over the last two days acutely worse this morning. Endorses new, nonproductive cough for the last few days.  She reports being intermittently compliant with her blood thinners and other medications.  Denies fever, chills, sore throat, headache, vision changes, abdominal pain, nausea, vomiting, diarrhea or melena.  Notes baseline swelling in her extremities which is possibly slightly increased.  Does not weigh herself frequently.  Denies sick contacts. Daughter notes she had a period of being mildly confused yesterday.  ______________________________________________________________________  Past Medical History:   Diagnosis Date     ANATOLIY (acute kidney injury) (H)      Allergic rhinitis      Arrhythmia      Atrial fibrillation with RVR (H)      Bacteremia      Breast cancer (H) 2017     Cardiomyopathy (H)      Centrilobular emphysema (H)      CHF (congestive heart failure) (H)      CKD (chronic kidney disease)      COPD (chronic obstructive pulmonary disease) (H)      Coronary artery disease      Depression      Dysphagia      E. coli sepsis (H)      Factor 5 Leiden mutation, heterozygous (H)      GERD (gastroesophageal reflux disease)      Hx of radiation therapy 2017     Hyperlipidemia      Hypertension      Hypokalemia      Hypomagnesemia      Idiopathic cardiomyopathy (H)      Left hip pain 4/30/2014     OAB (overactive bladder)      Osteoporosis      Sacral insufficiency fracture      Sinusitis      Syncope      Urge incontinence      Vocal cord dysfunction         Past Surgical History:   Procedure Laterality Date     ARTHROPLASTY REVISION HIP Left   "    BIOPSY BREAST Right 2017     BLADDER SURGERY      bladder interstim with removal     CARDIAC CATHETERIZATION       CATARACT EXTRACTION Left 2017     IR ABDOMINAL AORTOGRAM  2003     IR AORTIC ARCH 4 VESSEL ANGIOGRAM  2003     IR MISCELLANEOUS PROCEDURE  2003     LUMPECTOMY BREAST Left 06/2017    x2     PICC TRIPLE LUMEN PLACEMENT  2022          ZZC OPEN TX FEMORAL FRACTURE DISTAL MED/LAT CONDYLE Left 10/28/2015    Procedure: OPEN REDUCTION INTERNAL FIXATION LEFT  PROXIMAL FEMUR PERIPROSTHETIC FRACTURE;  Surgeon: Yovanny Albarran MD;  Location: [facility] Main OR;  Service: Orthopedics       Family History   Problem Relation Age of Onset     Osteoporosis Other      Prostate Cancer Brother      Breast Cancer Maternal Aunt         age thought to be in her 70's-80's     Prostate Cancer Maternal Uncle        Social History     Tobacco Use     Smoking status: Former Smoker     Quit date: 10/28/2007     Years since quittin.4     Smokeless tobacco: Former User     Quit date: 2004   Substance Use Topics     Alcohol use: No     Drug use: No       REVIEW OF SYSTEMS:    Review of Systems   Constitutional: Negative for chills and fever.   HENT: Negative for sore throat.    Eyes: Negative for visual disturbance.   Respiratory: Positive for cough and shortness of breath.    Cardiovascular: Positive for chest pain.   Gastrointestinal: Negative for abdominal pain, diarrhea, nausea and vomiting.   Genitourinary: Negative for dysuria, frequency, hematuria and urgency.   Musculoskeletal: Negative for myalgias.   Skin: Negative for rash.   Neurological: Negative for light-headedness and headaches.   Psychiatric/Behavioral: The patient is not nervous/anxious.    All other systems reviewed and are negative.        Physical Exam   /67   Pulse 99   Temp 98.2  F (36.8  C) (Oral)   Resp (!) 42   Ht 1.549 m (5' 1\")   Wt 49.9 kg (110 lb 0.2 oz)   SpO2 (!) 82%   BMI 20.79 kg/m      Physical " Exam  Constitutional:       General: She is not in acute distress.     Appearance: Normal appearance.   HENT:      Head: Normocephalic.   Eyes:      Conjunctiva/sclera: Conjunctivae normal.   Cardiovascular:      Rate and Rhythm: Normal rate and regular rhythm.      Heart sounds: Normal heart sounds.   Pulmonary:      Comments: Decreased breath sounds bilaterally.  Limited air movement.  Expiratory wheeze in lower lobes.  Mild increased work of breathing.  No tachypnea.  Abdominal:      General: There is no distension.      Palpations: Abdomen is soft.      Tenderness: There is no abdominal tenderness. There is no guarding or rebound.   Musculoskeletal:         General: No swelling or deformity. Normal range of motion.      Cervical back: Normal range of motion.      Comments: 1+ pitting edema L LE, mild pitting edema RLE.  TTP to BLEs. No anterior chest wall TTP.   Skin:     General: Skin is warm.   Neurological:      General: No focal deficit present.      Mental Status: She is alert.   Psychiatric:         Mood and Affect: Mood normal.         Lab Work (Reviewed and Interpreted):   Labs Ordered and Resulted from Time of ED Arrival to Time of ED Departure   BASIC METABOLIC PANEL - Abnormal       Result Value    Sodium 136      Potassium 3.5      Chloride 103      Carbon Dioxide (CO2) 20 (*)     Anion Gap 13      Urea Nitrogen 36 (*)     Creatinine 2.44 (*)     Calcium 8.5      Glucose 89      GFR Estimate 20 (*)    BLOOD GAS VENOUS - Abnormal    pH Venous 7.34 (*)     pCO2 Venous 40      pO2 Venous 29      Bicarbonate Venous 21 (*)     Base Excess/Deficit (+/-) -4.0      Oxyhemoglobin Venous 53.0 (*)     O2 Sat, Venous 54.1 (*)    B-TYPE NATRIURETIC PEPTIDE ([facility] ONLY) - Abnormal     (*)    CBC WITH PLATELETS AND DIFFERENTIAL - Abnormal    WBC Count 36.5 (*)     RBC Count 3.85      Hemoglobin 10.9 (*)     Hematocrit 33.1 (*)     MCV 86      MCH 28.3      MCHC 32.9      RDW 13.9      Platelet Count 241      DIFFERENTIAL - Abnormal    % Neutrophils 87      % Lymphocytes 2      % Monocytes 11      % Eosinophils 0      % Basophils 0      Absolute Neutrophils 31.8 (*)     Absolute Lymphocytes 0.7 (*)     Absolute Monocytes 4.0 (*)     Absolute Eosinophils 0.0      Absolute Basophils 0.0      RBC Morphology Confirmed RBC Indices      Platelet Assessment        Value: Automated Count Confirmed. Platelet morphology is normal.   CRP INFLAMMATION - Abnormal    CRP 36.9 (*)    TROPONIN I - Normal    Troponin I 0.03     MAGNESIUM - Normal    Magnesium 1.9     INFLUENZA A/B & SARS-COV2 PCR MULTIPLEX - Normal    Influenza A PCR Negative      Influenza B PCR Negative      SARS CoV2 PCR Negative     PARTIAL THROMBOPLASTIN TIME - Normal    aPTT 30     TROPONIN I - Normal    Troponin I 0.03     LACTIC ACID WHOLE BLOOD - Normal    Lactic Acid 1.7     PROCALCITONIN   BLOOD CULTURE   BLOOD CULTURE       Imaging (Reviewed and Interpreted):   NM Lung Scan Perfusion Particulate   Final Result   IMPRESSION:    1.  Chronic perfusion abnormalities throughout both lungs similar to the previous exam. No new perfusion abnormalities. Recent chest x-ray is suggestive of left mid and upper lung pneumonia.      XR Chest Port 1 View   Final Result   IMPRESSION:       New moderate opacity in the left mid to upper lung suggestive of pneumonia given history of cough. However, this requires follow-up to complete resolution to exclude an underlying lesion (6-8 week follow-up radiographs).      Right PICC tip near the cavoatrial junction.      Normal heart size. Atherosclerosis.      No significant pleural effusion.            US Lower Extremity Venous Duplex Bilateral   Final Result   IMPRESSION:   1.  No deep venous thrombosis in the bilateral lower extremities.          EKG (Reviewed and Interpreted):   Sinus rhythm with frequent PVCs  No acute ST elevation or depression  EKG results reviewed and interpreted by Dr. Sandro ED MD.       Ana العراقي PA-C    Emergency Medicine   [facility] EMERGENCY ROOM  [address] [facility] [address]  [phone]  Dept: [phone]        Ana العراقي PA-C  04/07/22 1935

## 2022-04-07 NOTE — ED PROVIDER NOTES
"Emergency Department Midlevel Supervisory Note     I personally saw the patient and performed a substantive portion of the visit including all aspects of the medical decision making.    ED Course:  2:16 PM Ana العراقي PA-C staffed patient with me. I agree with their assessment and plan of management, and I will see the patient.  2:44 PM I met with the patient to introduce myself, gather additional history, perform my initial exam, and discuss the plan.     Brief HPI:     Irene León is a 77 year old female who presents for evaluation of COPD exacerbation.    Patient reports worsening shortness of breath since 4/5. She notes additional symptoms of diarrhea and a nonproductive cough. Patient denies any associated fever, syncope, or numbness/tingling. She also expresses some left sided chest pain that is provoked with deep inhalations. She states it \"feels more like a pulled muscle.\"     Patient is on 2 L home O2 and she wears it 24/7.  Per daughter's report, patient developed chest pain 4/3 (~4 days ago). Patient's daughter also noted swelling to the bilateral legs. She further explains that the patient displayed symptoms of confusion and nausea yesterday, 4/6. Patient has a history of COPD exacerbation last year. Patient's daughter reports that patient is not taking her medications regularly, and not taking her blood thinners at all.     I,  Arabella Nogueira , am serving as a scribe to document services personally performed by Guero Jo DO, based on my observations and the provider's statements to me.   I, Guero Jo, attest that Arabella Nogueira was acting in a scribe capacity, has observed my performance of the services and has documented them in accordance with my direction.    Brief Physical Exam:  Constitutional:  Alert, in no acute distress  EYES: Conjunctivae clear  HENT:  Atraumatic, normocephalic  Respiratory:  Respirations unlabored, in no acute respiratory distress. Definitive wheezing in all lung " feelings.   Cardiovascular:  Regular rhythm, good peripheral perfusion. Tachycardic.   GI: Soft, nondistended, nontender, no palpable masses, no rebound, no guarding   Musculoskeletal:  No edema. No cyanosis. Range of motion major extremities intact.    Integument: Warm, Dry, No erythema, No rash. Chronic bruising to bilateral lower extremities.   Neurologic:  Alert & oriented, no focal deficits noted  Psych: Normal mood and affect    MDM:  77-year-old female presenting to the emergency department with complaint of shortness of breath.  Has known history of COPD.  Symptoms are concerning for potential COPD exacerbation versus PE, with potential for ACS as well.  Therefore we did decide initially to start on heparin with concern that she could potentially have a significant PE especially with her initial presenting vital signs.  She had significant wheezing on exam consistent with an exacerbation of her normal COPD.  Lab testing and imaging reveals no evidence of PE but does show evidence of pneumonia, and with her symptoms would be consistent with early sepsis picture.  Additionally I do believe there to be a COPD exacerbation on this patient.  At this time, patient will require admission for further management and has been accepted by the hospitalist.      ED Course as of 04/07/22 2053   Thu Apr 07, 2022 1928 Irene is a 77 year old female with a relevant PMH of HLD, breast CA, CHF, COPD, HTN, factor V Leiden on Xarelto, CKD, osteoporosis who presents to the ED for evaluation of shortness of breath and chest pain.    1928 My exam is notable for BP 78/47, mild increased work of breathing, mild tachypnea, oxygen saturation 92% on 3 L by nasal cannula and otherwise normal vital signs in a chronically ill-appearing woman.  Decreased breath sounds bilaterally with expiratory wheeze in lower lung fields.  1+ pitting edema bilaterally L> R.  No anterior chest wall TTP.   1929 EKG reveals sinus rhythm with PVCs.   Incomplete left bundle branch block.  No acute ST elevation or depression.  No pathologic arrhythmia.  When compared to prior EKG no significant change from baseline.  Labs notable for leukocytosis 36.5 with left shift, hemoglobin 10.9 down from 1310 months ago, , VBG is grossly unremarkable, ANATOLIY with creatinine 2.44 and BUN 36, and initial troponin 0 0.03.    1931 Patient has a history of factor V Leiden and is noncompliant with her Xarelto.  She got a history of heart failure and is noncompliant with Lasix.  Exam most consistent with COPD exacerbation and possible pneumonia.  She did endorse exertional chest pain on arrival so she was given aspirin.  Initial troponin is reassuring and I suspect alternative source of exertional and pleuritic chest pain.  Will trend troponins.  Given history of factor V Leiden, pleuritic chest pain, contraindication to CTA and length of time it will likely take to get a VQ scan I did elect to heparinize her at arrival.  Discussed risk and benefit with the patient and her daughter at the bedside and they are agreeable to this.  She was also given Solu-Medrol, duo nebs, ceftriaxone, doxycycline on arrival with concern for COPD exacerbation.  Chest x-ray shows focal pneumonia.  VQ scan without evidence of PE and also concerning for pneumonia.  Delta troponin pending.  Given patient's increased oxygen needs, chest pain, and poor compliance I do think she benefit from inpatient admission for further management especially given hypotension on arrival.  Discussed this with the patient and her daughter and they are agreeable to this plan.  Discussed with admitting physician Dr. Hill.        1. COPD exacerbation (H)    2. Pneumonia due to infectious organism, unspecified laterality, unspecified part of lung        Labs and Imaging:  Results for orders placed or performed during the hospital encounter of 04/07/22   US Lower Extremity Venous Duplex Bilateral    Impression     IMPRESSION:  1.  No deep venous thrombosis in the bilateral lower extremities.   XR Chest Port 1 View    Impression    IMPRESSION:     New moderate opacity in the left mid to upper lung suggestive of pneumonia given history of cough. However, this requires follow-up to complete resolution to exclude an underlying lesion (6-8 week follow-up radiographs).    Right PICC tip near the cavoatrial junction.    Normal heart size. Atherosclerosis.    No significant pleural effusion.       NM Lung Scan Perfusion Particulate    Impression    IMPRESSION:   1.  Chronic perfusion abnormalities throughout both lungs similar to the previous exam. No new perfusion abnormalities. Recent chest x-ray is suggestive of left mid and upper lung pneumonia.   Basic metabolic panel   Result Value Ref Range    Sodium 136 136 - 145 mmol/L    Potassium 3.5 3.5 - 5.0 mmol/L    Chloride 103 98 - 107 mmol/L    Carbon Dioxide (CO2) 20 (L) 22 - 31 mmol/L    Anion Gap 13 5 - 18 mmol/L    Urea Nitrogen 36 (H) 8 - 28 mg/dL    Creatinine 2.44 (H) 0.60 - 1.10 mg/dL    Calcium 8.5 8.5 - 10.5 mg/dL    Glucose 89 70 - 125 mg/dL    GFR Estimate 20 (L) >60 mL/min/1.73m2   Result Value Ref Range    Troponin I 0.03 0.00 - 0.29 ng/mL   Result Value Ref Range    Magnesium 1.9 1.8 - 2.6 mg/dL   Blood gas venous   Result Value Ref Range    pH Venous 7.34 (L) 7.35 - 7.45    pCO2 Venous 40 35 - 50 mm Hg    pO2 Venous 29 25 - 47 mm Hg    Bicarbonate Venous 21 (L) 24 - 30 mmol/L    Base Excess/Deficit (+/-) -4.0   mmol/L    Oxyhemoglobin Venous 53.0 (L) 70.0 - 75.0 %    O2 Sat, Venous 54.1 (L) 70.0 - 75.0 %   B-Type Natriuretic Peptide ([facility] Only)   Result Value Ref Range     (H) 0 - 144 pg/mL   Symptomatic; Yes; 4/6/2022 Influenza A/B & SARS-CoV2 (COVID-19) Virus PCR Multiplex Nasopharyngeal    Specimen: Nasopharyngeal; Swab   Result Value Ref Range    Influenza A PCR Negative Negative    Influenza B PCR Negative Negative    SARS CoV2 PCR Negative Negative    Partial thromboplastin time   Result Value Ref Range    aPTT 30 22 - 38 Seconds   CBC with platelets and differential   Result Value Ref Range    WBC Count 36.5 (H) 4.0 - 11.0 10e3/uL    RBC Count 3.85 3.80 - 5.20 10e6/uL    Hemoglobin 10.9 (L) 11.7 - 15.7 g/dL    Hematocrit 33.1 (L) 35.0 - 47.0 %    MCV 86 78 - 100 fL    MCH 28.3 26.5 - 33.0 pg    MCHC 32.9 31.5 - 36.5 g/dL    RDW 13.9 10.0 - 15.0 %    Platelet Count 241 150 - 450 10e3/uL   Extra Red Top Tube   Result Value Ref Range    Hold Specimen JIC    Manual Differential   Result Value Ref Range    % Neutrophils 87 %    % Lymphocytes 2 %    % Monocytes 11 %    % Eosinophils 0 %    % Basophils 0 %    Absolute Neutrophils 31.8 (H) 1.6 - 8.3 10e3/uL    Absolute Lymphocytes 0.7 (L) 0.8 - 5.3 10e3/uL    Absolute Monocytes 4.0 (H) 0.0 - 1.3 10e3/uL    Absolute Eosinophils 0.0 0.0 - 0.7 10e3/uL    Absolute Basophils 0.0 0.0 - 0.2 10e3/uL    RBC Morphology Confirmed RBC Indices     Platelet Assessment  Automated Count Confirmed. Platelet morphology is normal.     Automated Count Confirmed. Platelet morphology is normal.   Troponin I (now)   Result Value Ref Range    Troponin I 0.03 0.00 - 0.29 ng/mL   CRP inflammation   Result Value Ref Range    CRP 36.9 (H) 0.0-<0.8 mg/dL   Lactic acid whole blood   Result Value Ref Range    Lactic Acid 1.7 0.7 - 2.0 mmol/L     I have reviewed the relevant laboratory and radiology studies      Guero Jo DO  [facility] EMERGENCY ROOM  [address] [facility] [address] [phone]       Guero Jo DO  04/07/22 2053

## 2022-04-07 NOTE — PROCEDURES
"PICC Line Insertion Procedure Note  Pt. Name: Irene León  MRN: [id number]         Procedure: Insertion of a  triple Lumen  5 fr  Bard SOLO (valved) Power PICC, Lot number [id number]    Indications: Access, Blood draw, multiple infusion    Contraindications : Left Lumpectomy    Procedure Details   Patient identified with 2 identifiers and \"Time Out\" conducted.  .     Central line insertion bundle followed: hand hygeine performed prior to procedure, site cleansed with cholraprep, hat, mask, sterile gloves,sterile gown worn, patient draped with maximum barrier head to toe drape, sterile field maintained.    The vein was assessed and found to be compressible and of adequate size. 4 ml 1% Lidocaine administered sq to the insertion site. A 4 Fr PICC was inserted into the basilic vein of the right arm with ultrasound guidance. 1 attempt(s) required to access vein.   Catheter threaded without difficulty. Good blood return noted.    Modified Seldinger Technique used for insertion.    The 8 sharps that are included in the PICC insertion kit were accounted for and disposed of in the sharps container prior to breakdown of the sterile field.    Catheter secured with Statlock, biopatch and Tegaderm dressing applied.    Findings:  Total catheter length  30 cm, with 0 cm exposed. Mid upper arm circumference is 23 cm. Catheter was flushed with 30 cc NS. Patient  tolerated procedure well.    Tip placement verified by 3CG Tip confirmation System. Tip placement in the [facility].    CLABSI prevention brochure left at bedside.    Patient's primary RN notified PICC is ready for use.    Comments:        Vipul Lucio, JARREDN, RN  [facility]  "

## 2022-04-07 NOTE — TELEPHONE ENCOUNTER
OT [facility] is calling regarding an FYI    Client's  O2 sats were at rest today was 89% with 2 liters of continuos oxygen  BP art rest was 98/52  Temp 98.9    Patient showed severe shortness of breath and chest pain- they advised to call 911 or go to ER so patient spouse will take her to ER today    Call back with

## 2022-04-07 NOTE — ED TRIAGE NOTES
Patient here via EMS, patient comes form home, hx of CHF and COPD, family is unsure if patient took her Lasix yesterday, patient reports that she began to have increased SOB last night and worse this morning.  EMS reports heavy work of breathing and brought her in on CPAP.  Ofelia Márquez RN.......4/7/2022 12:49 PM

## 2022-04-07 NOTE — ED NOTES
RT gave duoneb x2 and tolerated it well. BS crackles pre and post. Pt remains on NC 2-3 lpm. Pt wears oxygen at home. RT will continue to monitor.    Rosanne Jaimes, RT

## 2022-04-07 NOTE — ED NOTES
RT was there upon arrival. Pt showed up on CPAP. Pt taken off CPAP and placed on NC 2 lpm NC which is her baseline. Pt states she does not like nebs she thinks they make everything worse. RT will continue to monitor.    Rosanne Jaimes, RT

## 2022-04-08 ENCOUNTER — APPOINTMENT (OUTPATIENT)
Dept: PHYSICAL THERAPY | Facility: CLINIC | Age: 77
DRG: 177 | End: 2022-04-08
Payer: MEDICARE

## 2022-04-08 ENCOUNTER — APPOINTMENT (OUTPATIENT)
Dept: CARDIOLOGY | Facility: CLINIC | Age: 77
DRG: 177 | End: 2022-04-08
Attending: STUDENT IN AN ORGANIZED HEALTH CARE EDUCATION/TRAINING PROGRAM
Payer: MEDICARE

## 2022-04-08 ENCOUNTER — APPOINTMENT (OUTPATIENT)
Dept: CT IMAGING | Facility: CLINIC | Age: 77
DRG: 177 | End: 2022-04-08
Payer: MEDICARE

## 2022-04-08 PROBLEM — I50.33 ACUTE ON CHRONIC DIASTOLIC CONGESTIVE HEART FAILURE (H): Status: ACTIVE | Noted: 2020-10-24

## 2022-04-08 PROBLEM — I48.91 ATRIAL FIBRILLATION WITH RAPID VENTRICULAR RESPONSE (H): Status: ACTIVE | Noted: 2022-04-08

## 2022-04-08 PROBLEM — J96.21 ACUTE ON CHRONIC RESPIRATORY FAILURE WITH HYPOXIA (H): Status: ACTIVE | Noted: 2022-04-08

## 2022-04-08 PROBLEM — N18.4 CHRONIC KIDNEY DISEASE, STAGE IV (SEVERE) (H): Status: ACTIVE | Noted: 2021-08-23

## 2022-04-08 PROBLEM — I42.8 NONISCHEMIC CARDIOMYOPATHY (H): Status: ACTIVE | Noted: 2021-08-23

## 2022-04-08 PROBLEM — R63.4 WEIGHT LOSS: Status: ACTIVE | Noted: 2022-04-08

## 2022-04-08 LAB
ANION GAP SERPL CALCULATED.3IONS-SCNC: 12 MMOL/L (ref 5–18)
ATRIAL RATE - MUSE: 100 BPM
ATRIAL RATE - MUSE: 64 BPM
ATRIAL RATE - MUSE: 98 BPM
BASOPHILS # BLD AUTO: 0.1 10E3/UL (ref 0–0.2)
BASOPHILS NFR BLD AUTO: 0 %
BUN SERPL-MCNC: 37 MG/DL (ref 8–28)
C REACTIVE PROTEIN LHE: 34.2 MG/DL (ref 0–0.8)
CALCIUM SERPL-MCNC: 7.8 MG/DL (ref 8.5–10.5)
CHLORIDE BLD-SCNC: 110 MMOL/L (ref 98–107)
CO2 SERPL-SCNC: 19 MMOL/L (ref 22–31)
CREAT SERPL-MCNC: 1.8 MG/DL (ref 0.6–1.1)
DIASTOLIC BLOOD PRESSURE - MUSE: NORMAL MMHG
EOSINOPHIL # BLD AUTO: 0.1 10E3/UL (ref 0–0.7)
EOSINOPHIL NFR BLD AUTO: 0 %
ERYTHROCYTE [DISTWIDTH] IN BLOOD BY AUTOMATED COUNT: 14 % (ref 10–15)
GFR SERPL CREATININE-BSD FRML MDRD: 29 ML/MIN/1.73M2
GLUCOSE BLD-MCNC: 160 MG/DL (ref 70–125)
HCT VFR BLD AUTO: 30 % (ref 35–47)
HGB BLD-MCNC: 10.1 G/DL (ref 11.7–15.7)
IMM GRANULOCYTES # BLD: 0.4 10E3/UL
IMM GRANULOCYTES NFR BLD: 1 %
INTERPRETATION ECG - MUSE: NORMAL
LACTATE SERPL-SCNC: 1 MMOL/L (ref 0.7–2)
LYMPHOCYTES # BLD AUTO: 0.3 10E3/UL (ref 0.8–5.3)
LYMPHOCYTES NFR BLD AUTO: 1 %
MAGNESIUM SERPL-MCNC: 1.8 MG/DL (ref 1.8–2.6)
MCH RBC QN AUTO: 28.5 PG (ref 26.5–33)
MCHC RBC AUTO-ENTMCNC: 33.7 G/DL (ref 31.5–36.5)
MCV RBC AUTO: 85 FL (ref 78–100)
MONOCYTES # BLD AUTO: 2 10E3/UL (ref 0–1.3)
MONOCYTES NFR BLD AUTO: 6 %
NEUTROPHILS # BLD AUTO: 29.7 10E3/UL (ref 1.6–8.3)
NEUTROPHILS NFR BLD AUTO: 92 %
NRBC # BLD AUTO: 0 10E3/UL
NRBC BLD AUTO-RTO: 0 /100
P AXIS - MUSE: 56 DEGREES
P AXIS - MUSE: 64 DEGREES
P AXIS - MUSE: NORMAL DEGREES
PHOSPHATE SERPL-MCNC: 2.3 MG/DL (ref 2.5–4.5)
PLATELET # BLD AUTO: 225 10E3/UL (ref 150–450)
POTASSIUM BLD-SCNC: 3.1 MMOL/L (ref 3.5–5)
POTASSIUM BLD-SCNC: 3.2 MMOL/L (ref 3.5–5)
POTASSIUM BLD-SCNC: 3.3 MMOL/L (ref 3.5–5)
PR INTERVAL - MUSE: 124 MS
PR INTERVAL - MUSE: 134 MS
PR INTERVAL - MUSE: NORMAL MS
QRS DURATION - MUSE: 120 MS
QRS DURATION - MUSE: 120 MS
QRS DURATION - MUSE: 132 MS
QT - MUSE: 298 MS
QT - MUSE: 390 MS
QT - MUSE: 402 MS
QTC - MUSE: 490 MS
QTC - MUSE: 503 MS
QTC - MUSE: 513 MS
R AXIS - MUSE: -33 DEGREES
R AXIS - MUSE: -57 DEGREES
R AXIS - MUSE: -62 DEGREES
RBC # BLD AUTO: 3.54 10E6/UL (ref 3.8–5.2)
SODIUM SERPL-SCNC: 141 MMOL/L (ref 136–145)
SYSTOLIC BLOOD PRESSURE - MUSE: NORMAL MMHG
T AXIS - MUSE: 111 DEGREES
T AXIS - MUSE: 97 DEGREES
T AXIS - MUSE: 98 DEGREES
VENTRICULAR RATE- MUSE: 100 BPM
VENTRICULAR RATE- MUSE: 163 BPM
VENTRICULAR RATE- MUSE: 98 BPM
WBC # BLD AUTO: 32.7 10E3/UL (ref 4–11)

## 2022-04-08 PROCEDURE — 84100 ASSAY OF PHOSPHORUS: CPT

## 2022-04-08 PROCEDURE — 93005 ELECTROCARDIOGRAM TRACING: CPT

## 2022-04-08 PROCEDURE — 250N000011 HC RX IP 250 OP 636: Performed by: FAMILY MEDICINE

## 2022-04-08 PROCEDURE — 84132 ASSAY OF SERUM POTASSIUM: CPT

## 2022-04-08 PROCEDURE — 120N000001 HC R&B MED SURG/OB

## 2022-04-08 PROCEDURE — 250N000011 HC RX IP 250 OP 636: Performed by: STUDENT IN AN ORGANIZED HEALTH CARE EDUCATION/TRAINING PROGRAM

## 2022-04-08 PROCEDURE — 250N000013 HC RX MED GY IP 250 OP 250 PS 637

## 2022-04-08 PROCEDURE — 94799 UNLISTED PULMONARY SVC/PX: CPT

## 2022-04-08 PROCEDURE — 93306 TTE W/DOPPLER COMPLETE: CPT

## 2022-04-08 PROCEDURE — 94640 AIRWAY INHALATION TREATMENT: CPT | Mod: 76

## 2022-04-08 PROCEDURE — 84132 ASSAY OF SERUM POTASSIUM: CPT | Performed by: FAMILY MEDICINE

## 2022-04-08 PROCEDURE — 93306 TTE W/DOPPLER COMPLETE: CPT | Mod: 26 | Performed by: INTERNAL MEDICINE

## 2022-04-08 PROCEDURE — 85025 COMPLETE CBC W/AUTO DIFF WBC: CPT | Performed by: STUDENT IN AN ORGANIZED HEALTH CARE EDUCATION/TRAINING PROGRAM

## 2022-04-08 PROCEDURE — 93005 ELECTROCARDIOGRAM TRACING: CPT | Performed by: STUDENT IN AN ORGANIZED HEALTH CARE EDUCATION/TRAINING PROGRAM

## 2022-04-08 PROCEDURE — 250N000009 HC RX 250: Performed by: FAMILY MEDICINE

## 2022-04-08 PROCEDURE — 250N000013 HC RX MED GY IP 250 OP 250 PS 637: Performed by: STUDENT IN AN ORGANIZED HEALTH CARE EDUCATION/TRAINING PROGRAM

## 2022-04-08 PROCEDURE — 97162 PT EVAL MOD COMPLEX 30 MIN: CPT | Mod: GP

## 2022-04-08 PROCEDURE — 80048 BASIC METABOLIC PNL TOTAL CA: CPT | Performed by: STUDENT IN AN ORGANIZED HEALTH CARE EDUCATION/TRAINING PROGRAM

## 2022-04-08 PROCEDURE — 93010 ELECTROCARDIOGRAM REPORT: CPT | Mod: HIP | Performed by: INTERNAL MEDICINE

## 2022-04-08 PROCEDURE — 250N000013 HC RX MED GY IP 250 OP 250 PS 637: Performed by: FAMILY MEDICINE

## 2022-04-08 PROCEDURE — 83605 ASSAY OF LACTIC ACID: CPT | Performed by: FAMILY MEDICINE

## 2022-04-08 PROCEDURE — 99223 1ST HOSP IP/OBS HIGH 75: CPT | Mod: AI | Performed by: STUDENT IN AN ORGANIZED HEALTH CARE EDUCATION/TRAINING PROGRAM

## 2022-04-08 PROCEDURE — 250N000009 HC RX 250: Performed by: STUDENT IN AN ORGANIZED HEALTH CARE EDUCATION/TRAINING PROGRAM

## 2022-04-08 PROCEDURE — 36415 COLL VENOUS BLD VENIPUNCTURE: CPT | Performed by: FAMILY MEDICINE

## 2022-04-08 PROCEDURE — 86140 C-REACTIVE PROTEIN: CPT | Performed by: STUDENT IN AN ORGANIZED HEALTH CARE EDUCATION/TRAINING PROGRAM

## 2022-04-08 PROCEDURE — 83735 ASSAY OF MAGNESIUM: CPT

## 2022-04-08 PROCEDURE — 71250 CT THORAX DX C-: CPT

## 2022-04-08 PROCEDURE — 250N000009 HC RX 250

## 2022-04-08 PROCEDURE — 97116 GAIT TRAINING THERAPY: CPT | Mod: GP

## 2022-04-08 PROCEDURE — 999N000157 HC STATISTIC RCP TIME EA 10 MIN

## 2022-04-08 RX ORDER — POTASSIUM CHLORIDE 1500 MG/1
20 TABLET, EXTENDED RELEASE ORAL ONCE
Status: DISCONTINUED | OUTPATIENT
Start: 2022-04-08 | End: 2022-04-08

## 2022-04-08 RX ORDER — MIRABEGRON 25 MG/1
25 TABLET, FILM COATED, EXTENDED RELEASE ORAL EVERY EVENING
Status: DISCONTINUED | OUTPATIENT
Start: 2022-04-08 | End: 2022-04-09

## 2022-04-08 RX ORDER — POTASSIUM CHLORIDE 1500 MG/1
20 TABLET, EXTENDED RELEASE ORAL ONCE
Status: COMPLETED | OUTPATIENT
Start: 2022-04-08 | End: 2022-04-08

## 2022-04-08 RX ORDER — FAMOTIDINE 20 MG/1
20 TABLET, FILM COATED ORAL
Status: DISCONTINUED | OUTPATIENT
Start: 2022-04-09 | End: 2022-04-13

## 2022-04-08 RX ORDER — VIT B COMP NO.3/FOLIC/C/BIOTIN 1 MG-60 MG
1 TABLET ORAL DAILY
Status: DISCONTINUED | OUTPATIENT
Start: 2022-04-08 | End: 2022-04-22 | Stop reason: HOSPADM

## 2022-04-08 RX ORDER — PIPERACILLIN SODIUM, TAZOBACTAM SODIUM 3; .375 G/15ML; G/15ML
3.38 INJECTION, POWDER, LYOPHILIZED, FOR SOLUTION INTRAVENOUS EVERY 8 HOURS
Status: DISCONTINUED | OUTPATIENT
Start: 2022-04-08 | End: 2022-04-12

## 2022-04-08 RX ORDER — SODIUM CHLORIDE FOR INHALATION 3 %
3 VIAL, NEBULIZER (ML) INHALATION 2 TIMES DAILY
Status: DISCONTINUED | OUTPATIENT
Start: 2022-04-08 | End: 2022-04-14

## 2022-04-08 RX ORDER — TRAMADOL HYDROCHLORIDE 50 MG/1
50 TABLET ORAL 2 TIMES DAILY
Status: DISCONTINUED | OUTPATIENT
Start: 2022-04-08 | End: 2022-04-22 | Stop reason: HOSPADM

## 2022-04-08 RX ORDER — FUROSEMIDE 10 MG/ML
20 INJECTION INTRAMUSCULAR; INTRAVENOUS ONCE
Status: COMPLETED | OUTPATIENT
Start: 2022-04-08 | End: 2022-04-08

## 2022-04-08 RX ORDER — PREDNISONE 20 MG/1
40 TABLET ORAL DAILY
Status: COMPLETED | OUTPATIENT
Start: 2022-04-09 | End: 2022-04-12

## 2022-04-08 RX ADMIN — ALBUTEROL SULFATE 2.5 MG: 2.5 SOLUTION RESPIRATORY (INHALATION) at 02:53

## 2022-04-08 RX ADMIN — GUAIFENESIN 1200 MG: 600 TABLET ORAL at 08:51

## 2022-04-08 RX ADMIN — DOXYCYCLINE 100 MG: 100 INJECTION, POWDER, LYOPHILIZED, FOR SOLUTION INTRAVENOUS at 03:37

## 2022-04-08 RX ADMIN — TRAMADOL HYDROCHLORIDE 25 MG: 50 TABLET, FILM COATED ORAL at 09:53

## 2022-04-08 RX ADMIN — SODIUM CHLORIDE SOLN NEBU 3% 3 ML: 3 NEBU SOLN at 20:04

## 2022-04-08 RX ADMIN — FUROSEMIDE 20 MG: 10 INJECTION, SOLUTION INTRAMUSCULAR; INTRAVENOUS at 05:56

## 2022-04-08 RX ADMIN — FUROSEMIDE 40 MG: 40 TABLET ORAL at 20:57

## 2022-04-08 RX ADMIN — ACETYLCYSTEINE 4 ML: 200 SOLUTION ORAL; RESPIRATORY (INHALATION) at 07:38

## 2022-04-08 RX ADMIN — FLUTICASONE FUROATE AND VILANTEROL TRIFENATATE 1 PUFF: 200; 25 POWDER RESPIRATORY (INHALATION) at 20:54

## 2022-04-08 RX ADMIN — POTASSIUM & SODIUM PHOSPHATES POWDER PACK 280-160-250 MG 1 PACKET: 280-160-250 PACK at 11:48

## 2022-04-08 RX ADMIN — TRAMADOL HYDROCHLORIDE 50 MG: 50 TABLET, FILM COATED ORAL at 02:40

## 2022-04-08 RX ADMIN — METOPROLOL SUCCINATE 75 MG: 50 TABLET, EXTENDED RELEASE ORAL at 11:07

## 2022-04-08 RX ADMIN — TAMOXIFEN CITRATE 20 MG: 10 TABLET ORAL at 21:09

## 2022-04-08 RX ADMIN — PIPERACILLIN AND TAZOBACTAM 3.38 G: 3; .375 INJECTION, POWDER, LYOPHILIZED, FOR SOLUTION INTRAVENOUS at 13:40

## 2022-04-08 RX ADMIN — IPRATROPIUM BROMIDE AND ALBUTEROL SULFATE 3 ML: 2.5; .5 SOLUTION RESPIRATORY (INHALATION) at 11:40

## 2022-04-08 RX ADMIN — GUAIFENESIN 1200 MG: 600 TABLET ORAL at 20:57

## 2022-04-08 RX ADMIN — PIPERACILLIN AND TAZOBACTAM 3.38 G: 3; .375 INJECTION, POWDER, LYOPHILIZED, FOR SOLUTION INTRAVENOUS at 02:47

## 2022-04-08 RX ADMIN — IPRATROPIUM BROMIDE AND ALBUTEROL SULFATE 3 ML: 2.5; .5 SOLUTION RESPIRATORY (INHALATION) at 16:27

## 2022-04-08 RX ADMIN — UMECLIDINIUM 1 PUFF: 62.5 AEROSOL, POWDER ORAL at 20:54

## 2022-04-08 RX ADMIN — PIPERACILLIN AND TAZOBACTAM 3.38 G: 3; .375 INJECTION, POWDER, LYOPHILIZED, FOR SOLUTION INTRAVENOUS at 21:03

## 2022-04-08 RX ADMIN — SOLIFENACIN SUCCINATE 5 MG: 5 TABLET, FILM COATED ORAL at 20:57

## 2022-04-08 RX ADMIN — DOXYCYCLINE 100 MG: 100 INJECTION, POWDER, LYOPHILIZED, FOR SOLUTION INTRAVENOUS at 16:18

## 2022-04-08 RX ADMIN — BENZONATATE 100 MG: 100 CAPSULE ORAL at 02:41

## 2022-04-08 RX ADMIN — ALBUTEROL SULFATE 2.5 MG: 2.5 SOLUTION RESPIRATORY (INHALATION) at 05:52

## 2022-04-08 RX ADMIN — POTASSIUM CHLORIDE 20 MEQ: 20 TABLET, EXTENDED RELEASE ORAL at 09:53

## 2022-04-08 RX ADMIN — GABAPENTIN 300 MG: 300 CAPSULE ORAL at 20:57

## 2022-04-08 RX ADMIN — ACETAMINOPHEN 1000 MG: 500 TABLET, FILM COATED ORAL at 05:45

## 2022-04-08 RX ADMIN — MIRABEGRON 25 MG: 25 TABLET, FILM COATED, EXTENDED RELEASE ORAL at 20:57

## 2022-04-08 RX ADMIN — B-COMPLEX W/ C & FOLIC ACID TAB 1 MG 1 TABLET: 1 TAB at 16:15

## 2022-04-08 RX ADMIN — SIMVASTATIN 40 MG: 40 TABLET, FILM COATED ORAL at 20:57

## 2022-04-08 RX ADMIN — IPRATROPIUM BROMIDE AND ALBUTEROL SULFATE 3 ML: 2.5; .5 SOLUTION RESPIRATORY (INHALATION) at 20:04

## 2022-04-08 RX ADMIN — POTASSIUM CHLORIDE 20 MEQ: 20 TABLET, EXTENDED RELEASE ORAL at 16:13

## 2022-04-08 RX ADMIN — GABAPENTIN 300 MG: 300 CAPSULE ORAL at 08:51

## 2022-04-08 RX ADMIN — RIVAROXABAN 15 MG: 15 TABLET, FILM COATED ORAL at 20:58

## 2022-04-08 RX ADMIN — TRAMADOL HYDROCHLORIDE 50 MG: 50 TABLET, COATED ORAL at 20:58

## 2022-04-08 RX ADMIN — POTASSIUM & SODIUM PHOSPHATES POWDER PACK 280-160-250 MG 1 PACKET: 280-160-250 PACK at 21:02

## 2022-04-08 RX ADMIN — METHYLPREDNISOLONE SODIUM SUCCINATE 40 MG: 40 INJECTION, POWDER, FOR SOLUTION INTRAMUSCULAR; INTRAVENOUS at 08:51

## 2022-04-08 RX ADMIN — IPRATROPIUM BROMIDE AND ALBUTEROL SULFATE 3 ML: 2.5; .5 SOLUTION RESPIRATORY (INHALATION) at 07:38

## 2022-04-08 ASSESSMENT — ACTIVITIES OF DAILY LIVING (ADL)
ADLS_ACUITY_SCORE: 11
ADLS_ACUITY_SCORE: 9
ADLS_ACUITY_SCORE: 11

## 2022-04-08 NOTE — PROGRESS NOTES
Care Management Follow Up    Length of Stay (days): 1    Expected Discharge Date: 04/09/2022     Concerns to be Addressed: denies needs/concerns at this time, care coordination/care conferences, discharge planning     Patient plan of care discussed at interdisciplinary rounds: Yes    Anticipated Discharge Disposition: Home Care     Anticipated Discharge Services: None  Anticipated Discharge DME: None    Patient/family educated on Medicare website which has current facility and service quality ratings: no (Not indicated.)  Education Provided on the Discharge Plan:    Patient/Family in Agreement with the Plan: yes    Referrals Placed by CM/SW:  (Not indicated.)  Private pay costs discussed: Not applicable    Additional Information:  Confirmed with [facility] that pt was open to home care services prior to admissions.      BRADEN Churchill

## 2022-04-08 NOTE — PROVIDER NOTIFICATION
Pt requested neb . Crackles and mild exp wheeze. States she is in pain and needs pain meds.     04/08/22 0552   Tech Time   $Tech Time (10 minute increments) 2   Nebulizer Assessment & Treatment   $RT Use ONLY Delivery Method Nebulizer - Additional   Nebulizer Device Mask   Pretreatment Heart Rate (beats/min) 99   Pretreatment Resp Rate (breaths/min) 32   Pretreatment O2 sats - (TCU only) 98   Pretreat Breath Sounds - Bilat - All Lobes crackles;wheezes, expiratory   Breath Sounds Post-Respiratory Treatment   Posttreatment Heart Rate (beats/min) 98   Posttreatment Resp Rate (breaths/min) 28   Post treatment O2 Sats - (TCU only) 98   Posttreatment Assessment (SVN) wheezing decreased   Signs of Intolerance (SVN) none

## 2022-04-08 NOTE — TREATMENT PLAN
RCAT Treatment Plan    Patient Score: 11  Patient Acuity: 3    Clinical Indication for Therapy: atelectasis    Therapy Ordered: Mucomyst/duoneb QID    Assessment Summary: pt BBS diminished with mild crackles . Very good wet cough.satsa 97% on 3 l/m nc .    Jesi Robin, RT  4/7/2022

## 2022-04-08 NOTE — PROGRESS NOTES
04/08/22 0751   RCAT Assessment   Reason for Assessment COPD   Pulmonary Status 4   Surgical Status 0   Chest X-ray 2   Respiratory Pattern 0   Mental Status 0   Breath Sounds 4   Cough Effectiveness 2   Level of Activity 1   O2 Required for SpO2>=92% 1   Acuity Level (points) 14   Acuity Level  3   Re-eval Interval Guideline Every 3 days   Re-evaluation Date 04/11/22   Clinical Indications/Symptoms   Aerosol Therapy RCAT protocol   Broncho-pulmonary Hygiene Rhonchi on auscultation   Aerosol Therapy Plan   RT Treatment Nebulizer   Aerosol Treatment Frequency Acuity Level 3: QID/PRN @noc-Mod wheezing/Hx asthma/secretion removal   Broncho-Pulmonary Hygiene Plan   Broncho-Pulmonary Hygiene Treatment Coughing techniques   Broncho-Pulm Hygiene Frequency Acuity Level 2 : QID & PRN @noc-Moderate amounts of secretions   RCAT done, changed Mucomyst to 3% saline nebs and added Aerobika flutter valve QID. Pt requires secretion clearance and 3% saline nebs with flutter valve should assist with this. RT will continue to follow and adjust medications as indicated. Umm Brandon, RT

## 2022-04-08 NOTE — PLAN OF CARE
Problem: COPD (Chronic Obstructive Pulmonary Disease) Comorbidity  Goal: Maintenance of COPD Symptom Control  Outcome: Ongoing, Progressing   Goal Outcome Evaluation:        RCAT completed and nebs changed to QID

## 2022-04-08 NOTE — PROGRESS NOTES
04/08/22 1600   Quick Adds   Type of Visit Initial PT Evaluation   Living Environment   People in Home spouse   Current Living Arrangements house   Home Accessibility stairs to enter home;stairs within home   Number of Stairs, Main Entrance 3   Stair Railings, Main Entrance railing on right side (ascending)   Number of Stairs, Within Home, Primary seven   Stair Railings, Within Home, Primary railing on right side (ascending)   Transportation Anticipated family or friend will provide   Self-Care   Usual Activity Tolerance fair   Current Activity Tolerance fair   Regular Exercise No   Equipment Currently Used at Home cane, straight;walker, rolling   Fall history within last six months no   General Information   Onset of Illness/Injury or Date of Surgery 04/07/22   Referring Physician Isma Farnklin MD   Patient/Family Therapy Goals Statement (PT) Return home   Pertinent History of Current Problem (include personal factors and/or comorbidities that impact the POC) COPD, Pneumonia   Existing Precautions/Restrictions oxygen therapy device and L/min   Bed Mobility   Bed Mobility sit-supine;scooting/bridging   Scooting/Bridging [place] (Bed Mobility) supervision;verbal cues   Sit-Supine [place] (Bed Mobility) verbal cues;supervision   Impairments Contributing to Impaired Bed Mobility decreased strength   Assistive Device (Bed Mobility)   (none)   Transfers   Transfers sit-stand transfer   Sit-Stand Transfer   Sit-Stand [place] (Transfers) verbal cues;supervision   Assistive Device (Sit-Stand Transfers) walker, front-wheeled   Gait/Stairs (Locomotion)   [place] Level (Gait) contact guard;verbal cues   Assistive Device (Gait) walker, front-wheeled   Distance in Feet (Required for LE Total Joints) 200'   Pattern (Gait) swing-through   Deviations/Abnormal Patterns (Gait) stride length decreased   Clinical Impression   Criteria for Skilled Therapeutic Intervention Yes, treatment indicated   PT  Diagnosis (PT) Impaired functional mobility   Influenced by the following impairments Weakness, pain   Functional limitations due to impairments Bed mobility, transfers, gait, stairs   Clinical Presentation (PT Evaluation Complexity) Evolving/Changing   Clinical Presentation Rationale Pt presents as medically diagnosed   Clinical Decision Making (Complexity) moderate complexity   Planned Therapy Interventions (PT) gait training;strengthening;stair training;ROM (range of motion);patient/family education   Anticipated Equipment Needs at Discharge (PT) walker, rolling   Risk & Benefits of therapy have been explained evaluation/treatment results reviewed;patient   PT Discharge Planning   PT Discharge Recommendation (DC Rec) home with home care physical therapy   PT Rationale for DC Rec  available to assist as needed   Plan of Care Review   Plan of Care Reviewed With patient   Total Evaluation Time   Total Evaluation Time (Minutes) 10   Physical Therapy Goals   PT Frequency Daily   PT Predicated Duration/Target Date for Goal Attainment 04/12/22   PT Goals Gait;Transfers;Stairs;PT Goal 1   PT: Transfers Modified independent;Assistive device   PT: Gait Greater than 200 feet;Assistive device;Modified independent   PT: Stairs 7 stairs;Minimal assist;Rail on right   PT: Goal 1 Independent with home exercise program

## 2022-04-08 NOTE — PROGRESS NOTES
352 KK    Patient lung sounds are very wet. She has fluids at 100 ml/hr. Should we decrease fluids or add lasix per RT? Thanks!    Susi [id number]

## 2022-04-08 NOTE — PLAN OF CARE
Goal Outcome Evaluation:    -Pt. Received on unit around 1945. Pt. A&O x4. Vitally stable.     -Pt. continues to be on 3L of O2 via NC, sats have been 88-94% most of the time. Pt. Does desaturate to low 80s with activity and coughing.  Pt. Continues to present with a non-productive cough. Mucinex administered as per the orders.    -Edema observed on BLEs, BLEs elevated on a pillow to alleviate the swelling. Lasix administered as per the orders.    -Pt. has a purewick, (pt. on lasix and she requested to have it on- she doesn't want to get up to the bathroom tonight).     -Pt.'s skin is very fragile. BLEs are very sensitive to touch, toe tips are very red.     -Heparin infusion stopped, Pt. started on oral anticoagulants (Xarelto).    PICC line in place.    Continue monitoring and follow plan of care.

## 2022-04-08 NOTE — PROGRESS NOTES
[facility]    Progress Note - Hospitalist Service       Date of Admission:  4/7/2022    Assessment & Plan     Irene León is a 77 year old female admitted on 4/7/2022. She HLD, CHF, COPD, HTN, Factor V Leiden on Xarelto, CKD, Osteoporosis and is admitted for chest tightness and shortness of breath, concerning for likely CAP complicated by severe COPD.      Community Acquired PNA  Left mid and upper lobe  Radiographic evidence on VQ and CXR left mid to upper lung suggestive of pneumonia with a Leukocytosis 36.5 with an ANC of 31.8 and elevation in CRP 36.9. Initial concern for shock with severe hypotension.   - IV Zosyn and IV Doxycycline  - Sputum gram stain and culture if able to obtain   - Hold on ID consult for now  - PRN Acetaminophen  - PT/OT    Acute Hypoxic Respiratory Failure  COPD Exacerbation  PFT in 2019 showed severe obstructive ventilatory defect with elements of reversibility. Hyperinflation and severely decreased diffusion capacity.  - O2 via nasal cannula goal > 88%-92% SpO2  - Continuous pulse ox monitoring  - RT consult   - Scheduled Duonebs Q4hrs  - PRN Albuterol Nebs Q2Hrs  - Continue Trelegy Ellipta 1 puff at bedtime  - Prednisone 40 mg daily x 5 days  - Mucomyst Q4H w/ Albuterol nebs  - Mucinex BID   - Tessalon PRN  - AM BMP, CBC, CRP    Weight Loss  Has lost 30-40 lbs over last 1-2 years. This could be related to advanced COPD, decreased oral intake. It seems worth evaluating more in the setting of hx of breast cancer, significant tobacco history, and left sided chest wall pain. If imaging negative, could consider depression in setting of chronic illness and social stressors at home  - CT Chest/ABD/Pelvis, non con due to renal function  - RD consult  - Consider Mirtazapine 15 mg at bedtime, will discuss with patient and her daughter    Afib w/ RVR  Hx of CHF   Last echo 7/28/2021 55-60%. Significant interval changes since last EKG with non-specific ST changes  and unstable baseline. Had tachycardia into 160's this morning. Repeat EKG showed Afib w/RVR. PTA Metoprolol was held in setting of hypotension yesterday.   - Continuous Cardiac Monitoring  - Lasix 40 mg BID  - Daily weights  - Strict I/O  - Serum Troponin x3 Q4H  - Complete Echocardiogram  - May need stress test at some point.  - Resume PTA Metoprolol 75 mg daily      Previous TIA  Hx of Factor V Leiden  Chronic perfusion abnormalities throughout both lungs on VQ scan  Chronic perfusion abnormalities throughout both lungs similar to the previous exam. No new perfusion abnormalities. Recent chest x-ray is suggestive of left mid and upper lung pneumonia. No lower extremity DVT on US.  - Continue PTA Xarelto 15 mg daily     ANATOLIY on CKD, Improving  Cr 2.44 BUN 36 with a variable baseline 1.5-2.0. Appears dry on exam. Creat improved to 1.8 today.   - BMP in AM  - Avoid nephrotoxic agents     Anemia   Hgb of 10.9 on admission baseline is variable but does appear to have some element of chronic anemia in Hgbs 13.3 - 9.8 in the last two months. No signs of acute bleed.  - Trend CBCs     Chronic Conditions and Medications  Breast Cancer  DCIS, ER/VT positive, HER-2 negative, s/p left breast lumpectomy and left axillary sentinel lymph node biopsy.  - Cont. PTA Tamoxifen 20 daily  Osteoporosis  - Hold PTA VitD and Ca supplementation  Allergies  - Hold PTA Cetirizine and Flonase  GERD  - Cont. PTA 20 mg pepcid at bedtime  Chronic Pain secondary to T12 compression fracture  - Cont PTA Gabapentin 600 mg BID   - Cont PTA Tramadol 50 mg Q12h  Overactive bladder  - Cont. PTA Mirabegron 50 mg and Solifenacin 5 mg        Diet: Combination Diet Renal Diet (dialysis); Low Saturated Fat Na <2400mg Diet, No Caffeine Diet    DVT Prophylaxis: DOAC  Hayes Catheter: Not present  Fluids: PO  Central Lines: PRESENT  PICC Triple Lumen 04/07/22 Right Basilic-Site Assessment: WDL  Cardiac Monitoring: ACTIVE order. Indication: Tachyarrhythmias,  acute (48 hours)  Code Status: Full Code      Disposition Plan   Expected Discharge: 04/09/2022     Anticipated discharge location: home with family;home with help/services    Delays: Pending clinical course       The patient's care was discussed with the Attending Physician, Dr. Isma Franklin, Bedside Nurse, Patient and Patient's Family.    Irene Lombardo MD  Hospitalist Service  [facility]  ______________________________________________________________________    Interval History   Feeling short of breath and coughing a lot. Cardiac monitor this morning with heart rate to 160's.     Data reviewed today: I reviewed all medications, new labs and imaging results over the last 24 hours. I personally reviewed the EKG tracing showing Afib with RVR.    Physical Exam   Vital Signs: Temp: 97.4  F (36.3  C) Temp src: Axillary BP: 108/68 Pulse: 92   Resp: 28 SpO2: 92 % O2 Device: Nasal cannula Oxygen Delivery: 2 LPM  Weight: 112 lbs 3.2 oz  General Appearance: Alert, thin, orientated, dyspneic when speaking  Respiratory: Rhonchi present in left middle and lower lobe, no wheezes  Cardiovascular: Tachycardic  GI: Soft, non tender  Skin: Fragile, thin, scattered bruising    Data   Recent Labs   Lab 04/08/22  0443 04/07/22  1437   WBC 32.7* 36.5*   HGB 10.1* 10.9*   MCV 85 86    241    136   POTASSIUM 3.1* 3.5   CHLORIDE 110* 103   CO2 19* 20*   BUN 37* 36*   CR 1.80* 2.44*   ANIONGAP 12 13   MESSI 7.8* 8.5   * 89     Recent Results (from the past 24 hour(s))   US Lower Extremity Venous Duplex Bilateral    Narrative    EXAM: US LOWER EXTREMITY VENOUS DUPLEX BILATERAL  LOCATION: [facility]  DATE/TIME: 4/7/2022 3:49 PM    INDICATION: hypoxia, hypotension; leg swelling  COMPARISON: None.  TECHNIQUE: Venous Duplex ultrasound of bilateral lower extremities with and without compression, augmentation and duplex. Color flow and spectral Doppler with waveform  analysis performed.    FINDINGS: Exam includes the common femoral, femoral, popliteal veins as well as segmentally visualized deep calf veins and greater saphenous vein.     RIGHT: No deep vein thrombosis. No superficial thrombophlebitis. No popliteal cyst.    LEFT: No deep vein thrombosis. No superficial thrombophlebitis. No popliteal cyst.      Impression    IMPRESSION:  1.  No deep venous thrombosis in the bilateral lower extremities.   XR Chest Port 1 View    Narrative    EXAM: XR CHEST PORT 1 VIEW  LOCATION: [facility]  DATE/TIME: 4/7/2022 4:49 PM    INDICATION: Cough.  COMPARISON: 06/02/2021.      Impression    IMPRESSION:     New moderate opacity in the left mid to upper lung suggestive of pneumonia given history of cough. However, this requires follow-up to complete resolution to exclude an underlying lesion (6-8 week follow-up radiographs).    Right PICC tip near the cavoatrial junction.    Normal heart size. Atherosclerosis.    No significant pleural effusion.       NM Lung Scan Perfusion Particulate    Narrative    EXAM: NM LUNG SCAN PERFUSION PARTICULATE  LOCATION: [facility]  DATE/TIME: 4/7/2022 5:18 PM    INDICATION: PE suspected, high prob  COMPARISON: Chest x-ray 04/07/2022, V/Q scan 06/24/2020, CT chest exam 09/13/2019  TECHNIQUE: 7.9 mCi technetium-99m MAA, IV. Standard lung perfusion imaging.    FINDINGS: Heterogeneous perfusion throughout the left lung with numerous small subsegmental perfusion defects similar to the previous VQ scan. Perfusion to the right lung is more homogeneous, with a few subtle areas of decreased activity also similar to   the prior exam.    Chest x-ray from today demonstrates emphysema with diffuse opacities in the left upper and mid left lung suggestive of pneumonia.      Impression    IMPRESSION:   1.  Chronic perfusion abnormalities throughout both lungs similar to the previous exam. No new perfusion abnormalities.  Recent chest x-ray is suggestive of left mid and upper lung pneumonia.

## 2022-04-08 NOTE — PLAN OF CARE
Goal Outcome Evaluation:      Problem: COPD (Chronic Obstructive Pulmonary Disease) Comorbidity  Goal: Maintenance of COPD Symptom Control  Outcome: Ongoing, Progressing  Intervention: Maintain COPD-Symptom Control  Recent Flowsheet Documentation  Taken 4/8/2022 0845 by Saida Reese RN  Medication Review/Management: medications reviewed     Reporting chronic mild to moderate back pain.  Treated with tramadol.  Wet-sounding, non-productive cough.  Adventitious lung sounds throughout. Tachycardic in the morning, up to 180s, asymptomatic, provider notified, restarted home metoprolol.  Remains on continuous cardiac monitoring with sinus tach, 90s-100s. Had CT and cardiac echo, results pending at this time.  Purewick in place for baseline incontinence. Remains on 2-3 L O2 as is baseline per pt.  A x 1 with walker and gait belt. A/O. Able to express needs.

## 2022-04-08 NOTE — PROGRESS NOTES
"Pt.'s daughter called at 2145 an wanted to know what time MD will be rounding in their mom in the morning. Staff let her know that it's not easy to tell on the MD's exact time because MDs round on pt. At different times depending on the pt.'s need (MD's priorities are different). Daughter seemed to be dissatisfied with the answer and was overheard saying \"this is so frustrating\". She even went ahead questioning writer if writer understood her and if writer works on this floor.     She asked whom she can call to know when the MD will be rounding since she said \"it's really important to know\", writer asked if she can call at [phone] and speak to a nurse, or if it is something that the MD needs to know right away for staff to page MD hernandez. Dtr. saurabhrily said, \"I will call in the morning\" and she hang up.  "

## 2022-04-08 NOTE — CONSULTS
Care Management Initial Consult      General Information  Assessment completed with: Patient,    Type of CM/SW Visit: Initial Assessment (CM Role Introduction and D/C Planning as well.)  Primary Care Provider verified and updated as needed: Yes   Readmission within the last 30 days: no previous admission in last 30 days   Reason for Consult: discharge planning, health care directive, transportation  Advance Care Planning: Advance Care Planning Reviewed: concerns discussed (Pt cannot find her original HCDs or where a copy may be.)  Writer provided pt two blank HCDs for her and her  per her request. Daughter Leelee was provided with information sheet from [employer] on completion/submission of HCDs.     Communication Assessment  Patient's communication style: spoken language (English or Bilingual)    Hearing Difficulty or Deaf: no   Wear Glasses or Blind: yes (uses glasses for reading)    Cognitive  Cognitive/Neuro/Behavioral: WDL                      Living Environment:   People in home: spouse     Current living Arrangements: house      Able to return to prior arrangements: yes     Family/Social Support:  Care provided by: self, spouse/significant other (Pt states  assists regularly.)  Provides care for: no one, unable/limited ability to care for self  Marital Status:   , Children  Santos ROSAS       Description of Support System: Supportive, Involved    Support Assessment: Adequate family and caregiver support, Adequate social supports (No concerns stated by pt or daughter.)    Current Resources:   Patient receiving home care services: Yes (Home care involvement verification needed.)  Skilled Home Care Services:  (Extent of care undetermined. Confirmation requested.)  Community Resources: Home Care  Equipment currently used at home: cane, straight, walker, rolling, grab bar, tub/shower, shower chair  Supplies currently used at home: Oxygen Tubing/Supplies, Nebulizer tubing (No concerns  "reported by pt.)    Employment/Financial:  Employment Status: retired     Financial Concerns: No concerns identified   Referral to Financial Worker: No     Lifestyle & Psychosocial Needs:  Social Determinants of Health     Tobacco Use: Medium Risk     Smoking Tobacco Use: Former Smoker     Smokeless Tobacco Use: Former User   Alcohol Use: Not on file   Financial Resource Strain: Not on file   Food Insecurity: Not on file   Transportation Needs: Not on file   Physical Activity: Not on file   Stress: Not on file   Social Connections: Not on file   Intimate Partner Violence: Not on file   Depression: Not at risk     PHQ-2 Score: 0   Housing Stability: Not on file     Functional Status:  Prior to admission patient needed assistance:   Dependent ADLs:: Ambulation-cane, Ambulation-walker, Bathing, Transfers  Dependent IADLs:: Cleaning, Cooking, Laundry, Shopping, Meal Preparation, Medication Management, Transportation, Money Management  Assessment of Functional Status: Not at  functional baseline    Mental Health Status:  Mental Health Status: No Current Concerns       Chemical Dependency Status:  Chemical Dependency Status: No Current Concerns           Values/Beliefs:  Spiritual, Cultural Beliefs, [employer] Practices, Values that affect care: no             Additional Information:  Writer met with pt to introduce Care Management service(CM) and obtain an initial assessment. Pt states she shares a house with her  Santos ROSAS(EK) who provides assistance for pt regularly. DME used for ambulation. Pt states that she recently had [facility] start providing some assistance - extent of services is currently undetermined.     4/7 per , T. -\"77 year old female who has a history of HLD, CHF, COPD, HTN, Factor V Leiden on Xarelto, CKD, Osteoporosis and is admitted for chest tightness and shortness of breath. Symptoms started 2-3 days ago had increased shortness of breath and dyspnea with exertion, she has had a new " "productive cough for the last few days. She also endorses chest congestion denies that it feels like chest pain. Endorses intermittent chills but denies fevers, sore throat, headache, nausea, vomiting, abdominal pain or sick contacts. She does endorse lower extremity swelling but no noticeable weight changes. She admits she is not always adherent to her Xarelto because she has bruises over her shins from hitting furniture but does take her other medications as prescribed.\"     PHARM, VAS ACC, RT CHRON SPEC, and INF DIS Consults have been ordered. No current PT or OT Consults ordered. Anticipate return home at discharge via family. Anticipate continued if not increased home care services unless hospital course dictates otherwise. CM to follow-up on outcomes from ordered consults and how they relate to possible discharge barriers to address. [facility] needs verification. Family anticipated to transport pt home without issue.    Mario Cantu RN        "

## 2022-04-08 NOTE — PROGRESS NOTES
"CLINICAL NUTRITION SERVICES - ASSESSMENT NOTE     Nutrition Prescription    RECOMMENDATIONS FOR MDs/PROVIDERS TO ORDER:  None at this time    Malnutrition Status:    % Weight Loss:  Weight loss does not meet criteria for malnutrition (7.4% in 5 months, 15% in 1 year)  % Intake: Unable to assess  Subcutaneous Fat Loss: Unable to assess  Muscle Loss: Unable to assess  Fluid Retention:  Mild     Malnutrition Diagnosis: Unable to determine due to patient unavailable    Recommendations already ordered by Registered Dietitian (RD):  Changed diet to Cardiac (removed Renal restrictions) to optimize intake give poor nutrition status.  Renal MVI    Future/Additional Recommendations:  Consider Regular diet if intake is poor  Will resume renal diet restrictions if needed     REASON FOR ASSESSMENT  Irene León is a/an 77 year old female assessed by the dietitian for Admission Nutrition Risk Screen for possible wt loss.    Patient admitted for COPD exacerbation with pneumonia and ANATOLIY on CKD4 with history of HTN and CHF.    NUTRITION HISTORY  Patient unavailable on multiple visit attempts. Unable to interview for nutrition history.    CURRENT NUTRITION ORDERS  Diet: Dialysis and Low Saturated Fat/2400 mg Sodium, No Caffeine  Intake/Tolerance: no documentation in chart but has been ordering adequately sized, balanced meals.    LABS  Labs reviewed: K 3.1, BUN 37, Cr 1.80, phos 2.3, Mg 1.8    MEDICATIONS  Medications reviewed: furosemide, solumedrol; on K, Mg, phos protocols    ANTHROPOMETRICS  Height: 149.9 cm (4' 11\")  Most Recent Weight: 50.9 kg (112 lb 3.2 oz)    IBW: 43 kg  BMI: Normal BMI  Weight History:   Wt Readings from Last 20 Encounters:   04/08/22 50.9 kg (112 lb 3.2 oz)   03/21/22 49.9 kg (110 lb)   01/12/22 49.9 kg (110 lb)   11/19/21 53.1 kg (117 lb)   11/18/21 54.1 kg (119 lb 4 oz)   11/10/21 54.9 kg (121 lb)   08/23/21 54.4 kg (120 lb)   08/17/21 56.6 kg (124 lb 11.2 oz)   08/05/21 54.4 kg (120 lb)   07/29/21 " 57.5 kg (126 lb 11.2 oz)   07/05/21 52.6 kg (116 lb)   06/29/21 56.8 kg (125 lb 4.8 oz)   06/21/21 56.6 kg (124 lb 12.8 oz)   06/02/21 57.4 kg (126 lb 8 oz)   05/25/21 56.7 kg (125 lb)   05/19/21 57.6 kg (127 lb)   05/04/21 57.8 kg (127 lb 8 oz)   04/26/21 59.3 kg (130 lb 12.8 oz)   04/22/21 59.3 kg (130 lb 12.8 oz)   04/15/21 60.1 kg (132 lb 9.6 oz)   Weight change: -20.5 lb (15%) in 1 year, -9 lb (7.4%) in 5 months; both borderline significant    Dosing Weight: 51 kg    ASSESSED NUTRITION NEEDS  Estimated Energy Needs: 4077-6145 kcals/day (25 - 30 kcals/kg)  Justification: Maintenance  Estimated Protein Needs: 51-61 grams protein/day (1 - 1.2 grams of pro/kg)  Justification: ANATOLIY on CKD  Estimated Fluid Needs: 1199-9094 mL/day (1 mL/kcal)   Justification: Maintenance    PHYSICAL FINDINGS  See malnutrition section below.  Per flowsheet  GI: WDL  Skin: no breakdown noted  Edema: +2 to left LE, +1 to right LE      MALNUTRITION:  % Weight Loss:  Weight loss does not meet criteria for malnutrition (7.4% in 5 months, 15% in 1 year)  % Intake: Unable to assess  Subcutaneous Fat Loss: Unable to assess  Muscle Loss: Unable to assess  Fluid Retention:  Mild     Malnutrition Diagnosis: Unable to determine due to patient unavailable    NUTRITION DIAGNOSIS  Unintended weight loss related to inadequate oral intake as evidenced by 15% wt loss in 1 year with reported poor PO intake.      INTERVENTIONS  Implementation  Change diet to cardiac to optimize intake. Patient is not on dialysis and renal function is improving, K/phos actually run low and are being replaced. Monitor renal function and consider resuming renal diet restrictions if needed in the future. If intake is poor recommend liberalizing to regular diet.  Renal MVI  Trial supplements once able to interview patient    Goals  Patient to consume % of nutritionally adequate meals three times per day, or the equivalent with supplements/snacks.  Improvement in renal  labs  Lytes WNL     Monitoring/Evaluation  Progress toward goals will be monitored and evaluated per protocol.

## 2022-04-08 NOTE — PLAN OF CARE
Pt is alert and oriented X3. Reports pain to back PRN Tramadol given. IV Zosyn and doxycycline administered. PRN tessalon pearls given for wet cough. IV fluids stopped d/t wet LS. One time dose of IV Lasix given, held scheduled PO dose.  ON tele monitoring running NSR with BBB and abnormal T wave.

## 2022-04-08 NOTE — PROVIDER NOTIFICATION
04/07/22 5645   Tech Time   $Tech Time (10 minute increments) 2   Breath Sounds   Breath Sounds All Fields   All Lung Fields Breath Sounds diminished   Nebulizer Assessment & Treatment   $RT Use ONLY Delivery Method Nebulizer - Additional   Nebulizer Device Mouthpiece   Pretreatment Heart Rate (beats/min) 95   Pretreatment Resp Rate (breaths/min) 24   Pretreatment O2 sats - (TCU only) 98   Pretreat Breath Sounds - Bilat - All Lobes diminished   Breath Sounds Post-Respiratory Treatment   Posttreatment Heart Rate (beats/min) 92   Posttreatment Resp Rate (breaths/min) 24   Post treatment O2 Sats - (TCU only) 100   Posttreatment Assessment (SVN) breath sounds unchanged

## 2022-04-09 ENCOUNTER — APPOINTMENT (OUTPATIENT)
Dept: PHYSICAL THERAPY | Facility: CLINIC | Age: 77
DRG: 177 | End: 2022-04-09
Payer: MEDICARE

## 2022-04-09 ENCOUNTER — APPOINTMENT (OUTPATIENT)
Dept: OCCUPATIONAL THERAPY | Facility: CLINIC | Age: 77
DRG: 177 | End: 2022-04-09
Payer: MEDICARE

## 2022-04-09 LAB
ANION GAP SERPL CALCULATED.3IONS-SCNC: 15 MMOL/L (ref 5–18)
BUN SERPL-MCNC: 37 MG/DL (ref 8–28)
C REACTIVE PROTEIN LHE: 27.7 MG/DL (ref 0–0.8)
CALCIUM SERPL-MCNC: 7.8 MG/DL (ref 8.5–10.5)
CALCIUM, IONIZED MEASURED: 0.98 MMOL/L (ref 1.11–1.3)
CHLORIDE BLD-SCNC: 110 MMOL/L (ref 98–107)
CO2 SERPL-SCNC: 17 MMOL/L (ref 22–31)
CREAT SERPL-MCNC: 1.72 MG/DL (ref 0.6–1.1)
ERYTHROCYTE [DISTWIDTH] IN BLOOD BY AUTOMATED COUNT: 13.9 % (ref 10–15)
GFR SERPL CREATININE-BSD FRML MDRD: 30 ML/MIN/1.73M2
GLUCOSE BLD-MCNC: 122 MG/DL (ref 70–125)
GLUCOSE BLDC GLUCOMTR-MCNC: 119 MG/DL (ref 70–99)
HCT VFR BLD AUTO: 32.3 % (ref 35–47)
HGB BLD-MCNC: 10.9 G/DL (ref 11.7–15.7)
ION CA PH 7.4: 0.96 MMOL/L (ref 1.11–1.3)
LACTATE SERPL-SCNC: 2.1 MMOL/L (ref 0.7–2)
LACTATE SERPL-SCNC: 2.2 MMOL/L (ref 0.7–2)
MAGNESIUM SERPL-MCNC: 1.8 MG/DL (ref 1.8–2.6)
MCH RBC QN AUTO: 29 PG (ref 26.5–33)
MCHC RBC AUTO-ENTMCNC: 33.7 G/DL (ref 31.5–36.5)
MCV RBC AUTO: 86 FL (ref 78–100)
PH: 7.37 (ref 7.35–7.45)
PHOSPHATE SERPL-MCNC: 2.7 MG/DL (ref 2.5–4.5)
PLATELET # BLD AUTO: 296 10E3/UL (ref 150–450)
POTASSIUM BLD-SCNC: 4 MMOL/L (ref 3.5–5)
RBC # BLD AUTO: 3.76 10E6/UL (ref 3.8–5.2)
SODIUM SERPL-SCNC: 142 MMOL/L (ref 136–145)
WBC # BLD AUTO: 35.3 10E3/UL (ref 4–11)

## 2022-04-09 PROCEDURE — 97165 OT EVAL LOW COMPLEX 30 MIN: CPT | Mod: GO

## 2022-04-09 PROCEDURE — 94640 AIRWAY INHALATION TREATMENT: CPT | Mod: 76

## 2022-04-09 PROCEDURE — 82310 ASSAY OF CALCIUM: CPT | Performed by: STUDENT IN AN ORGANIZED HEALTH CARE EDUCATION/TRAINING PROGRAM

## 2022-04-09 PROCEDURE — 36415 COLL VENOUS BLD VENIPUNCTURE: CPT | Performed by: STUDENT IN AN ORGANIZED HEALTH CARE EDUCATION/TRAINING PROGRAM

## 2022-04-09 PROCEDURE — 94799 UNLISTED PULMONARY SVC/PX: CPT

## 2022-04-09 PROCEDURE — 250N000013 HC RX MED GY IP 250 OP 250 PS 637: Performed by: STUDENT IN AN ORGANIZED HEALTH CARE EDUCATION/TRAINING PROGRAM

## 2022-04-09 PROCEDURE — 250N000011 HC RX IP 250 OP 636: Performed by: STUDENT IN AN ORGANIZED HEALTH CARE EDUCATION/TRAINING PROGRAM

## 2022-04-09 PROCEDURE — 99233 SBSQ HOSP IP/OBS HIGH 50: CPT | Mod: GC

## 2022-04-09 PROCEDURE — 97530 THERAPEUTIC ACTIVITIES: CPT | Mod: GP | Performed by: PHYSICAL THERAPIST

## 2022-04-09 PROCEDURE — 999N000157 HC STATISTIC RCP TIME EA 10 MIN

## 2022-04-09 PROCEDURE — 120N000001 HC R&B MED SURG/OB

## 2022-04-09 PROCEDURE — 258N000003 HC RX IP 258 OP 636

## 2022-04-09 PROCEDURE — 93005 ELECTROCARDIOGRAM TRACING: CPT | Performed by: STUDENT IN AN ORGANIZED HEALTH CARE EDUCATION/TRAINING PROGRAM

## 2022-04-09 PROCEDURE — 250N000013 HC RX MED GY IP 250 OP 250 PS 637: Performed by: FAMILY MEDICINE

## 2022-04-09 PROCEDURE — 82330 ASSAY OF CALCIUM: CPT | Performed by: STUDENT IN AN ORGANIZED HEALTH CARE EDUCATION/TRAINING PROGRAM

## 2022-04-09 PROCEDURE — 83605 ASSAY OF LACTIC ACID: CPT | Performed by: FAMILY MEDICINE

## 2022-04-09 PROCEDURE — 93005 ELECTROCARDIOGRAM TRACING: CPT

## 2022-04-09 PROCEDURE — 86140 C-REACTIVE PROTEIN: CPT | Performed by: STUDENT IN AN ORGANIZED HEALTH CARE EDUCATION/TRAINING PROGRAM

## 2022-04-09 PROCEDURE — 84100 ASSAY OF PHOSPHORUS: CPT | Performed by: STUDENT IN AN ORGANIZED HEALTH CARE EDUCATION/TRAINING PROGRAM

## 2022-04-09 PROCEDURE — 83735 ASSAY OF MAGNESIUM: CPT | Performed by: STUDENT IN AN ORGANIZED HEALTH CARE EDUCATION/TRAINING PROGRAM

## 2022-04-09 PROCEDURE — 250N000013 HC RX MED GY IP 250 OP 250 PS 637

## 2022-04-09 PROCEDURE — 85027 COMPLETE CBC AUTOMATED: CPT | Performed by: STUDENT IN AN ORGANIZED HEALTH CARE EDUCATION/TRAINING PROGRAM

## 2022-04-09 PROCEDURE — 97535 SELF CARE MNGMENT TRAINING: CPT | Mod: GO

## 2022-04-09 PROCEDURE — 36415 COLL VENOUS BLD VENIPUNCTURE: CPT | Performed by: FAMILY MEDICINE

## 2022-04-09 PROCEDURE — 250N000011 HC RX IP 250 OP 636: Performed by: FAMILY MEDICINE

## 2022-04-09 PROCEDURE — 83605 ASSAY OF LACTIC ACID: CPT

## 2022-04-09 PROCEDURE — 250N000012 HC RX MED GY IP 250 OP 636 PS 637: Performed by: STUDENT IN AN ORGANIZED HEALTH CARE EDUCATION/TRAINING PROGRAM

## 2022-04-09 PROCEDURE — 250N000009 HC RX 250

## 2022-04-09 PROCEDURE — 250N000009 HC RX 250: Performed by: FAMILY MEDICINE

## 2022-04-09 PROCEDURE — 36415 COLL VENOUS BLD VENIPUNCTURE: CPT

## 2022-04-09 PROCEDURE — 97116 GAIT TRAINING THERAPY: CPT | Mod: GP | Performed by: PHYSICAL THERAPIST

## 2022-04-09 RX ORDER — HYDROXYZINE HYDROCHLORIDE 10 MG/1
10 TABLET, FILM COATED ORAL
Status: COMPLETED | OUTPATIENT
Start: 2022-04-09 | End: 2022-04-09

## 2022-04-09 RX ORDER — CALCIUM CARBONATE 500(1250)
500 TABLET ORAL DAILY
Status: DISCONTINUED | OUTPATIENT
Start: 2022-04-09 | End: 2022-04-14

## 2022-04-09 RX ORDER — SODIUM CHLORIDE 9 MG/ML
INJECTION, SOLUTION INTRAVENOUS CONTINUOUS
Status: DISCONTINUED | OUTPATIENT
Start: 2022-04-09 | End: 2022-04-11

## 2022-04-09 RX ORDER — GABAPENTIN 100 MG/1
200 CAPSULE ORAL 2 TIMES DAILY
Status: DISCONTINUED | OUTPATIENT
Start: 2022-04-09 | End: 2022-04-15

## 2022-04-09 RX ORDER — POTASSIUM CHLORIDE 1500 MG/1
20 TABLET, EXTENDED RELEASE ORAL ONCE
Status: COMPLETED | OUTPATIENT
Start: 2022-04-09 | End: 2022-04-09

## 2022-04-09 RX ORDER — OLANZAPINE 10 MG/2ML
2.5 INJECTION, POWDER, FOR SOLUTION INTRAMUSCULAR ONCE
Status: COMPLETED | OUTPATIENT
Start: 2022-04-09 | End: 2022-04-09

## 2022-04-09 RX ORDER — FUROSEMIDE 40 MG
40 TABLET ORAL DAILY
Status: DISCONTINUED | OUTPATIENT
Start: 2022-04-10 | End: 2022-04-14

## 2022-04-09 RX ORDER — BENZTROPINE MESYLATE 0.5 MG/1
1 TABLET ORAL 3 TIMES DAILY PRN
Status: DISCONTINUED | OUTPATIENT
Start: 2022-04-09 | End: 2022-04-22 | Stop reason: HOSPADM

## 2022-04-09 RX ADMIN — FAMOTIDINE 20 MG: 20 TABLET ORAL at 20:26

## 2022-04-09 RX ADMIN — DOXYCYCLINE 100 MG: 100 INJECTION, POWDER, LYOPHILIZED, FOR SOLUTION INTRAVENOUS at 19:38

## 2022-04-09 RX ADMIN — GUAIFENESIN 1200 MG: 600 TABLET ORAL at 20:26

## 2022-04-09 RX ADMIN — Medication 5 MG: at 20:26

## 2022-04-09 RX ADMIN — SIMVASTATIN 40 MG: 40 TABLET, FILM COATED ORAL at 20:26

## 2022-04-09 RX ADMIN — DOXYCYCLINE 100 MG: 100 INJECTION, POWDER, LYOPHILIZED, FOR SOLUTION INTRAVENOUS at 05:03

## 2022-04-09 RX ADMIN — OLANZAPINE 2.5 MG: 10 INJECTION, POWDER, FOR SOLUTION INTRAMUSCULAR at 06:18

## 2022-04-09 RX ADMIN — ACETAMINOPHEN 1000 MG: 500 TABLET, FILM COATED ORAL at 12:34

## 2022-04-09 RX ADMIN — CALCIUM 500 MG: 500 TABLET ORAL at 14:57

## 2022-04-09 RX ADMIN — PIPERACILLIN AND TAZOBACTAM 3.38 G: 3; .375 INJECTION, POWDER, LYOPHILIZED, FOR SOLUTION INTRAVENOUS at 14:56

## 2022-04-09 RX ADMIN — PIPERACILLIN AND TAZOBACTAM 3.38 G: 3; .375 INJECTION, POWDER, LYOPHILIZED, FOR SOLUTION INTRAVENOUS at 07:46

## 2022-04-09 RX ADMIN — PREDNISONE 40 MG: 20 TABLET ORAL at 09:10

## 2022-04-09 RX ADMIN — METOPROLOL SUCCINATE 75 MG: 50 TABLET, EXTENDED RELEASE ORAL at 20:30

## 2022-04-09 RX ADMIN — TRAMADOL HYDROCHLORIDE 50 MG: 50 TABLET, COATED ORAL at 19:42

## 2022-04-09 RX ADMIN — B-COMPLEX W/ C & FOLIC ACID TAB 1 MG 1 TABLET: 1 TAB at 10:04

## 2022-04-09 RX ADMIN — BENZONATATE 100 MG: 100 CAPSULE ORAL at 01:45

## 2022-04-09 RX ADMIN — BENZONATATE 100 MG: 100 CAPSULE ORAL at 10:09

## 2022-04-09 RX ADMIN — IPRATROPIUM BROMIDE AND ALBUTEROL SULFATE 3 ML: 2.5; .5 SOLUTION RESPIRATORY (INHALATION) at 16:35

## 2022-04-09 RX ADMIN — IPRATROPIUM BROMIDE AND ALBUTEROL SULFATE 3 ML: 2.5; .5 SOLUTION RESPIRATORY (INHALATION) at 20:23

## 2022-04-09 RX ADMIN — Medication 1 MG: at 02:11

## 2022-04-09 RX ADMIN — IPRATROPIUM BROMIDE AND ALBUTEROL SULFATE 3 ML: 2.5; .5 SOLUTION RESPIRATORY (INHALATION) at 07:51

## 2022-04-09 RX ADMIN — SODIUM CHLORIDE SOLN NEBU 3% 3 ML: 3 NEBU SOLN at 20:28

## 2022-04-09 RX ADMIN — UMECLIDINIUM 1 PUFF: 62.5 AEROSOL, POWDER ORAL at 20:25

## 2022-04-09 RX ADMIN — GABAPENTIN 300 MG: 300 CAPSULE ORAL at 08:55

## 2022-04-09 RX ADMIN — TRAMADOL HYDROCHLORIDE 50 MG: 50 TABLET, COATED ORAL at 08:55

## 2022-04-09 RX ADMIN — FUROSEMIDE 40 MG: 40 TABLET ORAL at 08:55

## 2022-04-09 RX ADMIN — GUAIFENESIN 1200 MG: 600 TABLET ORAL at 08:55

## 2022-04-09 RX ADMIN — GABAPENTIN 200 MG: 100 CAPSULE ORAL at 20:26

## 2022-04-09 RX ADMIN — TAMOXIFEN CITRATE 20 MG: 10 TABLET ORAL at 20:28

## 2022-04-09 RX ADMIN — SODIUM CHLORIDE 500 ML: 9 INJECTION, SOLUTION INTRAVENOUS at 17:25

## 2022-04-09 RX ADMIN — BENZONATATE 100 MG: 100 CAPSULE ORAL at 20:39

## 2022-04-09 RX ADMIN — POTASSIUM CHLORIDE 20 MEQ: 20 TABLET, EXTENDED RELEASE ORAL at 01:45

## 2022-04-09 RX ADMIN — IPRATROPIUM BROMIDE AND ALBUTEROL SULFATE 3 ML: 2.5; .5 SOLUTION RESPIRATORY (INHALATION) at 13:26

## 2022-04-09 RX ADMIN — RIVAROXABAN 15 MG: 15 TABLET, FILM COATED ORAL at 20:26

## 2022-04-09 RX ADMIN — SODIUM CHLORIDE SOLN NEBU 3% 3 ML: 3 NEBU SOLN at 07:52

## 2022-04-09 RX ADMIN — HYDROXYZINE HYDROCHLORIDE 10 MG: 10 TABLET ORAL at 02:14

## 2022-04-09 RX ADMIN — FLUTICASONE FUROATE AND VILANTEROL TRIFENATATE 1 PUFF: 200; 25 POWDER RESPIRATORY (INHALATION) at 20:25

## 2022-04-09 ASSESSMENT — ACTIVITIES OF DAILY LIVING (ADL)
ADLS_ACUITY_SCORE: 13
ADLS_ACUITY_SCORE: 13
ADLS_ACUITY_SCORE: 9
ADLS_ACUITY_SCORE: 13
ADLS_ACUITY_SCORE: 9
ADLS_ACUITY_SCORE: 13
ADLS_ACUITY_SCORE: 13
ADLS_ACUITY_SCORE: 9
ADLS_ACUITY_SCORE: 13
ADLS_ACUITY_SCORE: 13
ADLS_ACUITY_SCORE: 9
ADLS_ACUITY_SCORE: 9
ADLS_ACUITY_SCORE: 13
ADLS_ACUITY_SCORE: 9
ADLS_ACUITY_SCORE: 13
ADLS_ACUITY_SCORE: 9

## 2022-04-09 NOTE — PROVIDER NOTIFICATION
04/08/22 2004   Tech Time   $Tech Time (10 minute increments) 4   Nebulizer Assessment & Treatment   $RT Use ONLY Delivery Method Nebulizer - Additional   Nebulizer Device Mask   Pretreatment Heart Rate (beats/min) 85   Pretreatment Resp Rate (breaths/min) 26   Pretreatment O2 sats - (TCU only) 96   Pretreat Breath Sounds - Bilat - All Lobes clear;diminished   Pretreat Breath Sounds - LLL diminished   Pretreat Breath Sounds - RLL diminished   Patient Position semi-Scanlon's   Breath Sounds Post-Respiratory Treatment   Posttreatment Heart Rate (beats/min) 87   Posttreatment Resp Rate (breaths/min) 26   Post treatment O2 Sats - (TCU only) 98   Breath Sounds Posttreatment All Fields diminished   Resp Acapella   Acapella Done   Repetitions (indicate number of repetitions) 15   Resistance Level (indicate level) 2

## 2022-04-09 NOTE — PLAN OF CARE
Goal Outcome Evaluation:        Problem: Plan of Care - These are the overarching goals to be used throughout the patient stay.    Goal: Optimal Comfort and Wellbeing  Outcome: Ongoing, Progressing    Reporting moderate chronic pain/discomfort in back.  Treated with scheduled medication and one dose of PRN tylenol.  A x 1 with walker and gait belt.  Ambulated to bathroom.  Purewick in place in anticipation of urge incontinence.  Telemonitoring showing NSR with bundle branch block.  Pt spoke of confusion overnight and one episode today.  Provider notified.  Remains on 2-3 L O2 to maintain sats > 90%, though occasionally removes nasal canula. Able to express needs.

## 2022-04-09 NOTE — PLAN OF CARE
Goal Outcome Evaluation:    Problem: COPD (Chronic Obstructive Pulmonary Disease) Comorbidity  Goal: Maintenance of COPD Symptom Control  Outcome: Ongoing, Progressing     Problem: Heart Failure Comorbidity  Goal: Maintenance of Heart Failure Symptom Control  Outcome: Ongoing, Progressing       Pt is A&Ox4. Vitals has been stable except for 1 elevated BP. Recheck planned but waited due to pt anxiety regarding getting pt's BP. Telemetry readings has been stable. Pt did not eat her meals. Nutritional consult placed by nurse. Discharge plan for home with HC services provided by [facility].

## 2022-04-09 NOTE — PLAN OF CARE
Patient became agitated during the night. She stated she was confused and couldn't remember where she was. She also began to hallucinate and said she saw her  and dog in the room. MD was notified. We attempted to redirect. Gave PRN melatonin and hydroxyzine. Patient later pulled out her PICC line after becoming more agitated. PRN Zyprexa was given. Patient appears to be doing better. Is not pulling at things as much. Patient now has a PIV that is IV antibiotics. On 3 L of oxygen. Alarms on for safety.

## 2022-04-09 NOTE — PLAN OF CARE
Problem: COPD (Chronic Obstructive Pulmonary Disease) Comorbidity  Goal: Maintenance of COPD Symptom Control  Outcome: Ongoing, Progressing   Goal Outcome Evaluation:

## 2022-04-09 NOTE — PROGRESS NOTES
Sepsis Evaluation Progress Note    I was called to see Irene León due to abnormal vital signs triggering the Sepsis SIRS screening alert. She is known to have an infection.     Physical Exam   Vital Signs:  Temp: 97.5  F (36.4  C) Temp src: Axillary BP: (!) 83/53 Pulse: 81   Resp: 16 SpO2: 94 % O2 Device: Nasal cannula Oxygen Delivery: 3 LPM     General: Alert, up in chair well appearing, improved even from this mrorning's exam.  Mental Status: baseline mental status.     Remainder of physical exam is significant for dyspnea on exertion consistent since admission, cardiac regular rate, lungs diminished with faint rhonchi on left.     Data   Lactic Acid   Date Value Ref Range Status   04/09/2022 2.1 (H) 0.7 - 2.0 mmol/L Final   04/08/2022 1.0 0.7 - 2.0 mmol/L Final     Assessment & Plan   NO EVIDENCE OF SEPSIS at this time.  Vital sign, physical exam, and lab findings are due to incorrect blood pressure cuff size. .   Blood pressure on recheck was 119/72, HR 80's, RR 16, afebrile.   Does not meet criteria for sepsis at this time with normal vital signs.   Has known CAP  Lactic acid 2.1  Will do  ml bolus over 2 hours  Recheck Lactic in 2 hours after bolus    Disposition: The patient will remain on the current unit. We will continue to monitor this patient closely. and Attending physician, Dr. Isma Franklin was notified. .  Irene Lombardo MD    Sepsis Criteria   Sepsis: 2+ SIRS criteria due to infection  Severe Sepsis: Sepsis AND 1+ new sign of acute organ dysfunction (Note: lactate >2 or acute encephalopathy each qualify as organ dysfunction)  Septic Shock: Sepsis AND hypotension despite volume resuscitation with 30 ml/kg crystalloid or lactate >=4  Note: HYPOTENSION is defined as 2 BP readings measured 3 hrs apart that have a SBP <90, MAP <65, or decrease >40 mmHg, occurring 6 hrs before or after t-zero

## 2022-04-09 NOTE — PROGRESS NOTES
[facility]    Progress Note - Hospitalist Service       Date of Admission:  4/7/2022    Assessment & Plan     Irene León is a 77 year old female admitted on 4/7/2022. She HLD, CHF, COPD, HTN, Factor V Leiden on Xarelto, CKD, Osteoporosis and is admitted for chest tightness and shortness of breath, concerning for likely CAP complicated by severe COPD.      Community Acquired PNA  Left mid and upper lobe  Radiographic evidence on VQ and CXR left mid to upper lung suggestive of pneumonia with a Leukocytosis 36.5 with an ANC of 31.8 and elevation in CRP 36.9. Initial concern for shock with severe hypotension. CRP downtrending.  - IV Zosyn and IV Doxycycline  - Sputum gram stain and culture if able to obtain   - PRN Acetaminophen  - PT/OT    Acute Hypoxic Respiratory Failure  COPD Exacerbation  Weight loss  Malnutrition, severe  PFT in 2019 showed severe obstructive ventilatory defect with elements of reversibility. Hyperinflation and severely decreased diffusion capacity. Has lost 30-40 lbs over last 1-2 years. Chest CT/Abd/Pelvis negative for concern for malignancy. Weight loss likely r/t failure to thrive and progression of COPD.   - O2 via nasal cannula goal > 88%-92% SpO2  - Continuous pulse ox monitoring  - RT consult   - Scheduled Duonebs Q4hrs  - PRN Albuterol Nebs Q2Hrs  - Continue Trelegy Ellipta 1 puff at bedtime  - Prednisone 40 mg daily x 5 days  - Mucomyst Q4H w/ Albuterol nebs  - Mucinex BID   - Tessalon PRN  - AM BMP, CBC, CRP  - RD consult     Delirium   Became acutely confused, disorientated this morning. Had hydroxyzine in evening to help with sleep. Was confused to the point that she pulled out her PICC line. Given Zyprexa IM, slept and was at baseline when seen this morning. Is also on medications at home that increase risk of delirium and confusion in the elderly. Steroids also likely contributing to delirium, but needs them unfortunately.   - Discontinue Vesicare,  Myrbetriq  - Decrease gabapentin to 200 mg BID  - Start Melatonin 5 mg at bedtime  - Seroquel 12.5 mg PRN at bedtime if melatonin  -    Afib w/ RVR on Xarelto  Hx of CHF   Hx TIA, Factor V Leiden   Last echo 7/28/2021 55-60%. Significant interval changes since last EKG with non-specific ST changes and unstable baseline. Had tachycardia into 160's this morning. Repeat EKG showed Afib w/RVR. PTA Metoprolol was held in setting of hypotension yesterday. ECHO with mild decrease in EF to 35-40%. Lasix decreased to 40 mg daily, appears dry and labs consistent with metabolic acidosis.   - Continuous Cardiac Monitoring  - Lasix 40 mg daily   - Daily weights  - Strict I/O  - ECHO completed   - May need stress test at some point.  - Resume PTA Metoprolol 75 mg daily      ANATOLIY on CKD, Improving  Cr 2.44 BUN 36 with a variable baseline 1.5-2.0. Appears dry on exam. Creat improved again today.    - BMP in AM  - Avoid nephrotoxic agents     Anemia   Hgb of 10.9 on admission baseline is variable but does appear to have some element of chronic anemia in Hgbs 13.3 - 9.8 in the last two months. No signs of acute bleed.  - Trend CBCs     Chronic Conditions and Medications  Breast Cancer  DCIS, ER/AZ positive, HER-2 negative, s/p left breast lumpectomy and left axillary sentinel lymph node biopsy.  - Cont. PTA Tamoxifen 20 daily  Osteoporosis  - Hold PTA VitD and Ca supplementation  Allergies  - Hold PTA Cetirizine and Flonase  GERD  - Cont. PTA 20 mg pepcid at bedtime  Chronic Pain secondary to T12 compression fracture  - Decrease gabapentin to 200 mg BID  - Cont PTA Tramadol 50 mg Q12h  Overactive bladder  - Discontinue PTA Mirabegron 50 mg and Solifenacin 5 mg due to confusion        Diet: Combination Diet Low Saturated Fat Na <2400mg Diet, No Caffeine Diet    DVT Prophylaxis: DOAC  Hayes Catheter: Not present  Fluids: PO  Central Lines: None  Cardiac Monitoring: ACTIVE order. Indication: Tachyarrhythmias, acute (48 hours)  Code  Status: Full Code      Disposition Plan   Expected Discharge: 04/09/2022     Anticipated discharge location: home with family;home with help/services    Delays: Pending clinical course       The patient's care was discussed with the Attending Physician, Dr. Isma Franklin, Bedside Nurse, Patient and Patient's Family.    Irene Lombardo MD  Hospitalist Service  [facility]  ______________________________________________________________________    Interval History   Had acute confusion and pulled her PICC line out early this morning. Now, A&O. Feels about the same respiratory wise but did sleep and is feeling better from that perspective.     Physical Exam   Vital Signs: Temp: 97.9  F (36.6  C) Temp src: Axillary BP: 124/68 Pulse: 84   Resp: 18 SpO2: 95 % O2 Device: Nasal cannula Oxygen Delivery: 3 LPM  Weight: 117 lbs 3.2 oz  General Appearance: Alert, thin, orientated, dyspneic when speaking  Respiratory: Rhonchi present in left middle and lower lobe->improved from yesterday, no wheezes  Cardiovascular: Regular rate  GI: Soft, non tender  Skin: Fragile, thin, scattered bruising    Data   Recent Labs   Lab 04/09/22  0628 04/08/22  2045 04/08/22  1454 04/08/22  0443 04/07/22  1437   WBC 35.3*  --   --  32.7* 36.5*   HGB 10.9*  --   --  10.1* 10.9*   MCV 86  --   --  85 86     --   --  225 241     --   --  141 136   POTASSIUM 4.0 3.3* 3.2* 3.1* 3.5   CHLORIDE 110*  --   --  110* 103   CO2 17*  --   --  19* 20*   BUN 37*  --   --  37* 36*   CR 1.72*  --   --  1.80* 2.44*   ANIONGAP 15  --   --  12 13   MESSI 7.8*  --   --  7.8* 8.5     --   --  160* 89     Recent Results (from the past 24 hour(s))   CT Chest Abdomen Pelvis w/o Contrast    Narrative    EXAM: CT CHEST ABDOMEN PELVIS W/O CONTRAST  LOCATION: [facility]  DATE/TIME: 4/8/2022 2:17 PM    INDICATION: History of breast cancer. Pneumonia. Weight loss.  COMPARISON: CT chest 6/12/2020, CT  chest/abdomen/pelvis 6/11/2019  TECHNIQUE: CT scan of the chest, abdomen, and pelvis was performed without IV contrast. Multiplanar reformats were obtained. Dose reduction techniques were used.   CONTRAST: None.    FINDINGS:   LUNGS AND PLEURA: Moderate irregular consolidation in the posterior left upper lobe. Confluent emphysema. Mild bibasilar atelectasis/scar. Trace pleural effusions. Stable mild diffuse bronchial wall thickening. A few stable benign pulmonary nodules.    MEDIASTINUM/AXILLAE: Right PICC. Mediastinal lymphadenopathy with the largest largest being a right paratracheal node measuring 1.6 cm in short axis, image 48:3. Small pericardial effusion. The main pulmonary artery is enlarged at 3.0 cm which may be   seen with pulmonary artery hypertension.    CORONARY ARTERY CALCIFICATION: Moderate.    HEPATOBILIARY: Normal.    PANCREAS: Normal.    SPLEEN: Normal.    ADRENAL GLANDS: Normal.    KIDNEYS/BLADDER: Low-lying right kidney. Subcentimeter renal hypodensities which are too small to characterize. No hydronephrosis.    BOWEL: No obstruction or inflammatory change. Normal appendix.    LYMPH NODES: Normal.    VASCULATURE: Stable ectasia of the abdominal aorta measuring 2.9 x 2.3 cm, image 169:3. Moderate atherosclerosis.    PELVIC ORGANS: Normal.    MUSCULOSKELETAL: Left hip arthroplasty. Osseous demineralization. Degenerative changes of the spine. Remote left rib fractures. T12 compression fracture with 80% loss of height which is likely remote.      Impression    IMPRESSION:  1.  Moderate left upper lobe consolidation. This is consistent with pneumonia. Recommend a follow-up chest radiograph in 4-6 weeks to document resolution.  2.  Mediastinal lymphadenopathy.  3.  No acute abnormality in the abdomen or pelvis.   Echocardiogram Complete    Narrative    [id number]  [id number]  [id number]  [id number]^TEREZA^GERALD^[place] [address]     Name: JUDY ZARCO  MRN:  [id number]  : 1945  Study Date: 2022 02:58 PM  Age: 77 yrs  Gender: Female  Patient Location: [facility]  Reason For Study: Syncope  Ordering Physician: GERALD STARKS  Performed By: SUGEY     BSA: 1.4 m2  Height: 59 in  Weight: 112 lb  ______________________________________________________________________________  Procedure  Complete Portable Echo Adult.  ______________________________________________________________________________  Interpretation Summary     1. The left ventricle is normal in size. Left ventricular systolic performance  is moderately reduced. The ejection fraction is estimated to be 35-40%.  2. There is moderate global reduction in left ventricular systolic  performance.  3. There is abnormal septal motion possibly related to altered electrical  activation due to bundle branch block.  4. There is mild-moderate, to perhaps moderate, tricuspid insufficiency.  5. Normal right ventricular size with mildly reduced right ventricular  systolic performance.  6. There is mild right atrial enlargement.  7. Right ventricular systolic pressure relative to right atrial pressure is  mildly increased. The pulmonary artery pressure is estimated to be 45-50 mmHg  plus right atrial pressure (the IVC is mildly dilated).     When compared to the prior real-time echocardiogram dated 2021, there  has been a mild further diminution in left ventricular systolic performance  (it is this reader's impression that left ventricular systolic performance was  mild-moderately reduced with an ejection fraction 40-45% on the prior  examination).  ______________________________________________________________________________  Left ventricle:  The left ventricle is normal in size. Left ventricular systolic performance is  moderately reduced. The ejection fraction is estimated to be 35-40%. There is  moderate global reduction in left ventricular systolic performance. There is  abnormal septal motion possibly related  to altered electrical activation due  to bundle branch block. Left ventricular wall thickness is normal. Incidental  note is made of a pseudotendon/false chord in the LV cavity.     Assessment of left atrial pressure (LAP): The cumulative findings are  indeterminate in evaluating left atrial pressure.     Right ventricle:  Normal right ventricular size with mildly reduced right ventricular systolic  performance.     Left atrium:  There is borderline left atrial enlargement.     Right atrium:  There is mild right atrial enlargement.     IVC:  The IVC is mildly dilated.     Aortic valve:  The aortic valve is comprised of three cusps.     Mitral valve:  There is mild mitral annular calcification. There is trace mitral  insufficiency.     Tricuspid valve:  The tricuspid valve is grossly morphologically normal. There is mild-moderate,  to perhaps moderate, tricuspid insufficiency.     Pulmonic valve:  The pulmonic valve is grossly morphologically normal.     Thoracic aorta:  The aortic root and proximal ascending aorta are of normal dimension.     Pericardium:  There is no significant pericardial effusion.  ______________________________________________________________________________  ______________________________________________________________________________  MMode/2D Measurements & Calculations  RVDd: 3.5 cm  IVSd: 0.98 cm  LVIDd: 4.1 cm  LVIDs: 3.3 cm  LVPWd: 0.90 cm  FS: 19.4 %  LV mass(C)d: 122.9 grams  LV mass(C)dI: 85.3 grams/m2  Ao root diam: 2.8 cm  LA dimension: 3.5 cm  asc Aorta Diam: 2.8 cm  LA/Ao: 1.2  LVOT diam: 1.8 cm  LVOT area: 2.6 cm2     LA Volume Indexed (AL/bp): 28.8 ml/m2  RWT: 0.44     Time Measurements  MM HR: 98.0 BPM     Doppler Measurements & Calculations  MV E max carolyn: 89.4 cm/sec  MV A max carolyn: 135.9 cm/sec  MV E/A: 0.66  MV dec time: 0.12 sec  LV V1 max PG: 3.2 mmHg  LV V1 max: 89.2 cm/sec  LV V1 VTI: 18.3 cm  SV(LVOT): 48.2 ml  SI(LVOT): 33.4 ml/m2  TR max carolyn: 348.4 cm/sec  TR max PG:  48.6 mmHg  E/E' av.9  Lateral E/e': 11.0  Medial E/e': 16.8     ______________________________________________________________________________  Report approved by: Deepak Anderson 2022 04:24 PM

## 2022-04-09 NOTE — PROGRESS NOTES
352 KK  Patient had 9 beats of V tach. She remains vitally stable. Denies CP, dizziness, SOB or nausea.   Susi [id number]    MD aware. Ordered Stat labs and EKG.     Patient pulled out her PICC line. Is agitated. MD aware, ordered PRN Zyprexa.

## 2022-04-09 NOTE — CONSULTS
"BRIEF NUTRITION ASSESSMENT      REASON FOR ASSESSMENT:  Irene León is a 77 year old female seen by Registered Dietitian for RN Consult - \"poor appetite\"    NUTRITION HISTORY:  Patient states that she has a very poor appetite over the past 2 weeks eating minimally, when asked if this has been going on longer she stated yes it has been going on for a while now and said yes to ~6 months.  - denies n/v, c/d. Just does not want to eat and has no hunger.  - denies difficulty chewing/swallowing foods    PMH: pneumonia, acute hypoxic respiratory failure, COPD exacerbation, 30-30# weight loss over past 1-2 years, CHF, ANATOLIY on CKD, breast cancer.    CURRENT DIET AND INTAKE:  Diet:  Orders Placed This Encounter      Combination Diet Low Saturated Fat Na <2400mg Diet, No Caffeine Diet  Intakes:   Patient having very poor intakes this admission with missed meals per flowsheets.  Admitted 4/7, 25% 2 meals yesterday 4/7.    ANTHROPOMETRICS:  Height: 4' 11\"  Weight:  117 lbs 3.2 oz  Body mass index is 23.67 kg/m .   Weight Status: Normal BMI  Weight History: weight gain this admission possibly d/t fluid gain although I/O -150 mL, question accuracy of weights and I/O.  Using lowest weight this admission (110 lb):   12.7% weight loss in 9 months    9.1% weight loss on 5 months  Wt Readings from Last 10 Encounters:   04/09/22 53.2 kg (117 lb 3.2 oz)   03/21/22 49.9 kg (110 lb)   01/12/22 49.9 kg (110 lb)   11/19/21 53.1 kg (117 lb)   11/18/21 54.1 kg (119 lb 4 oz)   11/10/21 54.9 kg (121 lb)   08/23/21 54.4 kg (120 lb)   08/17/21 56.6 kg (124 lb 11.2 oz)   08/05/21 54.4 kg (120 lb)   07/29/21 57.5 kg (126 lb 11.2 oz)     LABS:  Labs noted  Labs:  Electrolytes  Potassium (mmol/L)   Date Value   04/09/2022 4.0   04/08/2022 3.3 (L)   04/08/2022 3.2 (L)     Phosphorus (mg/dL)   Date Value   04/09/2022 2.7   04/08/2022 2.3 (L)   03/09/2021 2.0 (L)   10/26/2020 4.2    Blood Glucose  Glucose (mg/dL)   Date Value   04/09/2022 122 "   04/08/2022 160 (H)   04/07/2022 89   01/12/2022 77   07/29/2021 88    Inflammatory Markers  CRP (mg/dL)   Date Value   04/09/2022 27.7 (H)   04/08/2022 34.2 (H)   04/07/2022 36.9 (H)     WBC Count (10e3/uL)   Date Value   04/09/2022 35.3 (H)   04/08/2022 32.7 (H)   04/07/2022 36.5 (H)     Albumin (g/dL)   Date Value   07/29/2021 3.9   06/02/2021 3.3 (L)   02/21/2021 3.2 (L)      Magnesium (mg/dL)   Date Value   04/09/2022 1.8   04/08/2022 1.8   04/07/2022 1.9     Sodium (mmol/L)   Date Value   04/09/2022 142   04/08/2022 141   04/07/2022 136    Renal  Urea Nitrogen (mg/dL)   Date Value   04/09/2022 37 (H)   04/08/2022 37 (H)   04/07/2022 36 (H)     Creatinine (mg/dL)   Date Value   04/09/2022 1.72 (H)   04/08/2022 1.80 (H)   04/07/2022 2.44 (H)     Additional  Ketones Urine (no units)   Date Value   03/09/2021 Negative        MALNUTRITION:  % Weight Loss:  Up to 10% in 6 months (moderate malnutrition)  % Intake:  </= 50% for >/= 5 days (severe malnutrition)  Subcutaneous Fat Loss:  Orbital region moderate depletion and Upper arm region severe depletion  Muscle Loss:  Temporal region moderate depletion, Clavicle bone region severe depletion, Dorsal hand region severe depletion, Anterior thigh region moderate depletion and Posterior calf region moderate depletion  Fluid Retention:  Mild in ankles    Malnutrition Diagnosis:  Severe malnutrition in the context of acute illness with underlying chronic disease.    NUTRITION INTERVENTION:  Nutrition Diagnosis:  Inadequate oral intakes related to poor appetite as evidenced by severe weight loss over the past 6 months, <50% meal intakes x2 weeks.    Recommendations for MD:  Liberalize diet order to regular diet to promote oral intakes - patient not eating enough to exceed low saturated fat, low sodium dietary restrictions at this time.    Implementation:  Nutrition Education:  Provided high calorie, high protein nutrition therapy education  Ordered Ensure Enlive BID  (vanilla and strawberry)    FOLLOW UP/MONITORING:   Will continue to follow per guidelines    Maria C Haney RDN, LD  Clinical Dietitian

## 2022-04-09 NOTE — PROGRESS NOTES
04/09/22 1015   Quick Adds   Type of Visit Initial Occupational Therapy Evaluation   Living Environment   People in Home spouse   Current Living Arrangements house   Living Environment Comments split level home, bed/bath on separate level than living area, walk in shower   Self-Care   Equipment Currently Used at Home shower chair;grab bar, toilet;grab bar, tub/shower;dressing device;cane, straight;walker, rolling  (4ww)   Activity/Exercise/Self-Care Comment pt reports indep with ADLs,  does all cooking/home tasks, dtr sets up pt meds every week and pt takes them indep. pt uses cane/4ww. is on 2L O2 at baseline   General Information   Onset of Illness/Injury or Date of Surgery 04/07/22   Referring Physician LIBBY Franklin MD   Patient/Family Therapy Goal Statement (OT) go home, not have back pain, be able to stand at sink without pain, be able to live long and be around my grand kids   Additional Occupational Profile Info/Pertinent History of Current Problem COPD exacerbation   Existing Precautions/Restrictions oxygen therapy device and L/min;fall   Cognitive Status Examination   Cognitive Status Comments further assessment rec'd   Sensory   Sensory Quick Adds No deficits were identified   Pain Assessment   Patient Currently in Pain Yes, see Vital Sign flowsheet  (low back)   Range of Motion Comprehensive   General Range of Motion no range of motion deficits identified   Strength Comprehensive (MMT)   Comment, General Manual Muscle Testing (MMT) Assessment deconditioned   Bed Mobility   Bed Mobility supine-sit   Comment (Bed Mobility) SBA   Transfers   Transfers sit-stand transfer;bed-chair transfer   Transfer Comments SBA, FWW   Balance   Balance Assessment no deficits were identified   Activities of Daily Living   BADL Assessment/Intervention   (declined)   Clinical Impression   Criteria for Skilled Therapeutic Interventions Met (OT) Yes, treatment indicated   OT Diagnosis decr ADL indep   Influenced by the  following impairments COPD, back pain from fx 3 months ago per pt report   OT Problem List-Impairments impacting ADL activity tolerance impaired;pain;strength   Assessment of Occupational Performance 1-3 Performance Deficits   Identified Performance Deficits funct transfers, endurance with all ADLs   Planned Therapy Interventions (OT) ADL retraining;progressive activity/exercise   Clinical Decision Making Complexity (OT) low complexity   Risk & Benefits of therapy have been explained evaluation/treatment results reviewed;participants included;patient   OT Discharge Planning   OT Discharge Recommendation (DC Rec) home with assist   OT Rationale for DC Rec pt has spouse to assist at home PRN, has all necessary AE/DME. limited by funct endurance and c/o back pain with ADLs at this time. pt has a tri-level home- stairs may be a barrier to returning home- defer stairs assessment to PT   Total Evaluation Time (Minutes)   Total Evaluation Time (Minutes) 10   OT Goals   Therapy Frequency (OT) Daily   OT Predicated Duration/Target Date for Goal Attainment 04/13/22   OT Goals Hygiene/Grooming;Toilet Transfer/Toileting   OT: Hygiene/Grooming modified independent;while standing  (with good EC techniques/ self initiated rest breaks)   OT: Toilet Transfer/Toileting Modified independent   Carmel Salgado, OTR/L

## 2022-04-09 NOTE — PROGRESS NOTES
Patient remains on 3L NC with diminished clear BS. Ordered nebs administered throughout the day. Will continue to follow and wean as tolerated. Flutter valve encouraged and used after each neb.    Lm Felix, RT

## 2022-04-10 ENCOUNTER — APPOINTMENT (OUTPATIENT)
Dept: RADIOLOGY | Facility: CLINIC | Age: 77
DRG: 177 | End: 2022-04-10
Attending: STUDENT IN AN ORGANIZED HEALTH CARE EDUCATION/TRAINING PROGRAM
Payer: MEDICARE

## 2022-04-10 ENCOUNTER — APPOINTMENT (OUTPATIENT)
Dept: PHYSICAL THERAPY | Facility: CLINIC | Age: 77
DRG: 177 | End: 2022-04-10
Payer: MEDICARE

## 2022-04-10 LAB
ANION GAP SERPL CALCULATED.3IONS-SCNC: 15 MMOL/L (ref 5–18)
BUN SERPL-MCNC: 34 MG/DL (ref 8–28)
C REACTIVE PROTEIN LHE: 13.5 MG/DL (ref 0–0.8)
CALCIUM SERPL-MCNC: 8.2 MG/DL (ref 8.5–10.5)
CHLORIDE BLD-SCNC: 112 MMOL/L (ref 98–107)
CO2 SERPL-SCNC: 17 MMOL/L (ref 22–31)
CREAT SERPL-MCNC: 1.65 MG/DL (ref 0.6–1.1)
ERYTHROCYTE [DISTWIDTH] IN BLOOD BY AUTOMATED COUNT: 14.5 % (ref 10–15)
GFR SERPL CREATININE-BSD FRML MDRD: 32 ML/MIN/1.73M2
GLUCOSE BLD-MCNC: 130 MG/DL (ref 70–125)
HCT VFR BLD AUTO: 34 % (ref 35–47)
HGB BLD-MCNC: 11.2 G/DL (ref 11.7–15.7)
LACTATE SERPL-SCNC: 1.6 MMOL/L (ref 0.7–2)
MAGNESIUM SERPL-MCNC: 1.4 MG/DL (ref 1.8–2.6)
MCH RBC QN AUTO: 27.8 PG (ref 26.5–33)
MCHC RBC AUTO-ENTMCNC: 32.9 G/DL (ref 31.5–36.5)
MCV RBC AUTO: 84 FL (ref 78–100)
PHOSPHATE SERPL-MCNC: 2.3 MG/DL (ref 2.5–4.5)
PLATELET # BLD AUTO: 317 10E3/UL (ref 150–450)
POTASSIUM BLD-SCNC: 3.5 MMOL/L (ref 3.5–5)
RBC # BLD AUTO: 4.03 10E6/UL (ref 3.8–5.2)
SODIUM SERPL-SCNC: 144 MMOL/L (ref 136–145)
WBC # BLD AUTO: 39.2 10E3/UL (ref 4–11)

## 2022-04-10 PROCEDURE — 120N000001 HC R&B MED SURG/OB

## 2022-04-10 PROCEDURE — 250N000009 HC RX 250

## 2022-04-10 PROCEDURE — 250N000013 HC RX MED GY IP 250 OP 250 PS 637

## 2022-04-10 PROCEDURE — 250N000011 HC RX IP 250 OP 636: Performed by: STUDENT IN AN ORGANIZED HEALTH CARE EDUCATION/TRAINING PROGRAM

## 2022-04-10 PROCEDURE — 250N000012 HC RX MED GY IP 250 OP 636 PS 637: Performed by: STUDENT IN AN ORGANIZED HEALTH CARE EDUCATION/TRAINING PROGRAM

## 2022-04-10 PROCEDURE — 250N000009 HC RX 250: Performed by: STUDENT IN AN ORGANIZED HEALTH CARE EDUCATION/TRAINING PROGRAM

## 2022-04-10 PROCEDURE — 999N000157 HC STATISTIC RCP TIME EA 10 MIN

## 2022-04-10 PROCEDURE — 83605 ASSAY OF LACTIC ACID: CPT | Performed by: STUDENT IN AN ORGANIZED HEALTH CARE EDUCATION/TRAINING PROGRAM

## 2022-04-10 PROCEDURE — 99233 SBSQ HOSP IP/OBS HIGH 50: CPT | Mod: GC | Performed by: STUDENT IN AN ORGANIZED HEALTH CARE EDUCATION/TRAINING PROGRAM

## 2022-04-10 PROCEDURE — 83735 ASSAY OF MAGNESIUM: CPT | Performed by: STUDENT IN AN ORGANIZED HEALTH CARE EDUCATION/TRAINING PROGRAM

## 2022-04-10 PROCEDURE — 250N000009 HC RX 250: Performed by: FAMILY MEDICINE

## 2022-04-10 PROCEDURE — 94799 UNLISTED PULMONARY SVC/PX: CPT

## 2022-04-10 PROCEDURE — 80048 BASIC METABOLIC PNL TOTAL CA: CPT | Performed by: FAMILY MEDICINE

## 2022-04-10 PROCEDURE — 258N000003 HC RX IP 258 OP 636: Performed by: STUDENT IN AN ORGANIZED HEALTH CARE EDUCATION/TRAINING PROGRAM

## 2022-04-10 PROCEDURE — 250N000013 HC RX MED GY IP 250 OP 250 PS 637: Performed by: STUDENT IN AN ORGANIZED HEALTH CARE EDUCATION/TRAINING PROGRAM

## 2022-04-10 PROCEDURE — 86140 C-REACTIVE PROTEIN: CPT | Performed by: STUDENT IN AN ORGANIZED HEALTH CARE EDUCATION/TRAINING PROGRAM

## 2022-04-10 PROCEDURE — 250N000011 HC RX IP 250 OP 636: Performed by: FAMILY MEDICINE

## 2022-04-10 PROCEDURE — 36415 COLL VENOUS BLD VENIPUNCTURE: CPT | Performed by: STUDENT IN AN ORGANIZED HEALTH CARE EDUCATION/TRAINING PROGRAM

## 2022-04-10 PROCEDURE — 84100 ASSAY OF PHOSPHORUS: CPT

## 2022-04-10 PROCEDURE — 87040 BLOOD CULTURE FOR BACTERIA: CPT | Performed by: STUDENT IN AN ORGANIZED HEALTH CARE EDUCATION/TRAINING PROGRAM

## 2022-04-10 PROCEDURE — 71045 X-RAY EXAM CHEST 1 VIEW: CPT

## 2022-04-10 PROCEDURE — 85014 HEMATOCRIT: CPT | Performed by: STUDENT IN AN ORGANIZED HEALTH CARE EDUCATION/TRAINING PROGRAM

## 2022-04-10 PROCEDURE — 94640 AIRWAY INHALATION TREATMENT: CPT | Mod: 76

## 2022-04-10 RX ORDER — IPRATROPIUM BROMIDE AND ALBUTEROL SULFATE 2.5; .5 MG/3ML; MG/3ML
3 SOLUTION RESPIRATORY (INHALATION)
Status: DISCONTINUED | OUTPATIENT
Start: 2022-04-10 | End: 2022-04-22 | Stop reason: HOSPADM

## 2022-04-10 RX ORDER — FUROSEMIDE 10 MG/ML
20 INJECTION INTRAMUSCULAR; INTRAVENOUS ONCE
Status: COMPLETED | OUTPATIENT
Start: 2022-04-10 | End: 2022-04-10

## 2022-04-10 RX ORDER — FUROSEMIDE 10 MG/ML
20 INJECTION INTRAMUSCULAR; INTRAVENOUS ONCE
Status: DISCONTINUED | OUTPATIENT
Start: 2022-04-10 | End: 2022-04-10

## 2022-04-10 RX ADMIN — IPRATROPIUM BROMIDE AND ALBUTEROL SULFATE 3 ML: 2.5; .5 SOLUTION RESPIRATORY (INHALATION) at 20:16

## 2022-04-10 RX ADMIN — ALBUTEROL SULFATE 2.5 MG: 2.5 SOLUTION RESPIRATORY (INHALATION) at 18:22

## 2022-04-10 RX ADMIN — UMECLIDINIUM 1 PUFF: 62.5 AEROSOL, POWDER ORAL at 21:36

## 2022-04-10 RX ADMIN — METOPROLOL SUCCINATE 75 MG: 50 TABLET, EXTENDED RELEASE ORAL at 21:25

## 2022-04-10 RX ADMIN — Medication 5 MG: at 21:24

## 2022-04-10 RX ADMIN — IPRATROPIUM BROMIDE AND ALBUTEROL SULFATE 3 ML: 2.5; .5 SOLUTION RESPIRATORY (INHALATION) at 15:32

## 2022-04-10 RX ADMIN — SODIUM CHLORIDE SOLN NEBU 3% 3 ML: 3 NEBU SOLN at 20:16

## 2022-04-10 RX ADMIN — FUROSEMIDE 20 MG: 10 INJECTION, SOLUTION INTRAVENOUS at 08:17

## 2022-04-10 RX ADMIN — ALBUTEROL SULFATE 2.5 MG: 2.5 SOLUTION RESPIRATORY (INHALATION) at 06:19

## 2022-04-10 RX ADMIN — PIPERACILLIN AND TAZOBACTAM 3.38 G: 3; .375 INJECTION, POWDER, LYOPHILIZED, FOR SOLUTION INTRAVENOUS at 10:24

## 2022-04-10 RX ADMIN — DOXYCYCLINE 100 MG: 100 INJECTION, POWDER, LYOPHILIZED, FOR SOLUTION INTRAVENOUS at 21:14

## 2022-04-10 RX ADMIN — RIVAROXABAN 15 MG: 15 TABLET, FILM COATED ORAL at 21:39

## 2022-04-10 RX ADMIN — IPRATROPIUM BROMIDE AND ALBUTEROL SULFATE 3 ML: 2.5; .5 SOLUTION RESPIRATORY (INHALATION) at 07:54

## 2022-04-10 RX ADMIN — IPRATROPIUM BROMIDE AND ALBUTEROL SULFATE 3 ML: 2.5; .5 SOLUTION RESPIRATORY (INHALATION) at 11:46

## 2022-04-10 RX ADMIN — BENZONATATE 100 MG: 100 CAPSULE ORAL at 04:08

## 2022-04-10 RX ADMIN — SODIUM CHLORIDE SOLN NEBU 3% 3 ML: 3 NEBU SOLN at 07:54

## 2022-04-10 RX ADMIN — PIPERACILLIN AND TAZOBACTAM 3.38 G: 3; .375 INJECTION, POWDER, LYOPHILIZED, FOR SOLUTION INTRAVENOUS at 00:09

## 2022-04-10 RX ADMIN — SODIUM CHLORIDE: 9 INJECTION, SOLUTION INTRAVENOUS at 00:09

## 2022-04-10 RX ADMIN — DOXYCYCLINE 100 MG: 100 INJECTION, POWDER, LYOPHILIZED, FOR SOLUTION INTRAVENOUS at 09:09

## 2022-04-10 RX ADMIN — TRAMADOL HYDROCHLORIDE 50 MG: 50 TABLET, COATED ORAL at 21:23

## 2022-04-10 RX ADMIN — SIMVASTATIN 40 MG: 40 TABLET, FILM COATED ORAL at 21:25

## 2022-04-10 RX ADMIN — PIPERACILLIN AND TAZOBACTAM 3.38 G: 3; .375 INJECTION, POWDER, LYOPHILIZED, FOR SOLUTION INTRAVENOUS at 18:11

## 2022-04-10 RX ADMIN — FLUTICASONE FUROATE AND VILANTEROL TRIFENATATE 1 PUFF: 200; 25 POWDER RESPIRATORY (INHALATION) at 21:40

## 2022-04-10 RX ADMIN — TAMOXIFEN CITRATE 20 MG: 10 TABLET ORAL at 21:38

## 2022-04-10 RX ADMIN — PREDNISONE 40 MG: 20 TABLET ORAL at 11:13

## 2022-04-10 RX ADMIN — GUAIFENESIN 1200 MG: 600 TABLET ORAL at 21:25

## 2022-04-10 RX ADMIN — GABAPENTIN 200 MG: 100 CAPSULE ORAL at 09:36

## 2022-04-10 RX ADMIN — GABAPENTIN 200 MG: 100 CAPSULE ORAL at 21:24

## 2022-04-10 RX ADMIN — TRAMADOL HYDROCHLORIDE 50 MG: 50 TABLET, COATED ORAL at 09:11

## 2022-04-10 ASSESSMENT — ACTIVITIES OF DAILY LIVING (ADL)
ADLS_ACUITY_SCORE: 13

## 2022-04-10 NOTE — PROGRESS NOTES
[facility]    Progress Note - Hospitalist Service       Date of Admission:  4/7/2022    Assessment & Plan        77-year-old female with PMH significant for HFrEF, HTN, factor V Leiden on Xarelto, CKD, osteoporosis, and 35+ pound unintentional weight loss in the past 1 year who was admitted on 4/7/2022 with shortness of breath and was found to have radiographic evidence consistent with pneumonia.  Noncontrast CT chest/abdomen/pelvis without obvious masses to suggest malignancy.  EKG showed A. fib with RVR.  Due to ANATOLIY on CKD, furosemide was held initially but restarted HD3.  Echocardiogram showed EF 35-40%, down from 55 to 60% 7/26 2021.  Overnight 4/8-4/9 she developed new onset agitation and confusion.  Some medications known to be associated with delirium were discontinued.  She pulled out her PICC line and received IM Zyprexa after which she slept well.  Barriers to discharge include ongoing delirium, poor oral intake in the setting of unintentional weight loss, receiving IV fluids and antibiotics, labile vital signs.        Community-acquired left mid and upper lobe pneumonia  Elevated lactate, improved  Persistent leukocytosis but in the setting of steroids for COPD exacerbation.  CRP downtrending.  Has not been able to produce sputum for Gram stain and culture.  -Trend CRP, WBC  -Continue IV Zosyn and IV Doxycycline  -If patient spikes a fever above 100.4, please collect blood cultures x2 and broaden antibiotic coverage  -As needed acetaminophen for fever       Acute hypoxic respiratory failure  COPD Exacerbation  Hyperchloremic metabolic acidosis  -Continue oral prednisone 40 mg daily to complete 5-day course  -Antibiotics as above   -Continuous pulse oximetry  -Supplemental O2 by NC, keep SPO2 88% to 92%. Avoid over oxygenation  -Scheduled Duonebs Q4hrs with Mucomyst  -PRN Albuterol Nebs Q2Hrs  -Continue Trelegy Ellipta 1 puff at bedtime   -Mucinex BID   -Tessalon PRN  -BMP,  CBC, CRP       Delirium  Agitation  Gabapentin was decreased to 200 mg twice daily.  Vesicare and Myrbetriq were discontinued.  Patient's room was changed to be closer to nursing station.  No indication for one-to-one at this time.  Reevaluate daily.  -Delirium precautions  -Bundle cares  -Continue melatonin 5 mg at bedtime  -Seroquel 12.5 mg as needed for agitation  -Open blinds, turn on lights during the day      Weight loss  Multifactorial in the setting of COPD, decreased oral intake  -RD consulted, appreciate recommendations and assistance  -Will consider D5 LR/NS tomorrow pending further assessment oral intake      Afib w/ RVR on Xarelto  Tachycardia  Prolonged QTC  Hx TIA, Factor V Leiden  Metoprolol was initially on hold for hypotension, now resumed.  Telemetry showing sinus tachycardia with HR low 100s, BBB, PAC, PVCs.  -Continuous telemetry  -Avoid QT prolonging medications  -Add on Mg, keep above 2  -Trend and replete electrolytes as needed   -Continue metoprolol as at home  -Continue Xarelto as at home      HFrEF   PTA took furosemide 80 mg daily.  Received IV Lasix 20 mg 4/10.  Echo showed reduced EF from prior.  Hyperinflated lungs without radiographic evidence to suggest heart failure exacerbation on CXR 4/10.  Could consider stress test prior to discharge pending clinical improvement.  -Oral Lasix 40 mg  tomorrow  -Metoprolol as at home  -Daily weights  -Strict I/O, pure wick if patient tolerates       ANATOLIY on CKD, Improving  Admission Cr 2.44 BUN 36 with a variable baseline 1.5-2.0.  Creatinine has been downtrending with IV hydration.    -Daily BMP  -Avoid nephrotoxins      Overactive bladder  Discontinued PTA Mirabegron 50 mg and Solifenacin 5 mg in the setting of acute delirium.  -Pure wick if patient tolerates       Normocytic normochromic anemia, stable  Admission Hgb of 10.9.  Hgbs 13.3 - 9.8 in the 2 months PTA. No ssx GI bleed.  -CBC as above      Fragile skin  -Wrap IV, avoid  adhesives  -WOC         Chronic Conditions and Medications  Breast Cancer  DCIS, ER/KS positive, HER-2 negative, s/p left breast lumpectomy and left axillary sentinel lymph node biopsy.  - Cont. PTA Tamoxifen 20 daily  Osteoporosis  - Hold PTA VitD and Ca supplementation  Allergies  - Hold PTA Cetirizine and Flonase  GERD  - Cont. PTA 20 mg pepcid at bedtime  Chronic Pain secondary to T12 compression fracture  - Continue gabapentin 200 mg BID (dose decreased this admission)  - Tramadol 50 mg Q12h as at home          Diet: Snacks/Supplements Adult: Ensure Enlive; With Meals  Regular Diet Adult    DVT Prophylaxis: Xarelto  Hayes Catheter: Not present  Fluids: NS @ 75 ml/hr  Central Lines: None  Cardiac Monitoring: ACTIVE order. Indication: QTc prolonging medication (48 hours)  Code Status: Full Code      Disposition Plan   Expected Discharge: 04/12/2022     Anticipated discharge location: home with family;home with help/services           The patient's care was discussed with the Attending Physician, Dr. Franklin.  Called patient's daughter with update.     Harmony Pineda MD   Teaching Service  [facility]  Securely message with the Vocera Web Console (learn more here)  Text page via Picket Paging/Directory       ______________________________________________________________________    Interval History   Care team notes reviewed.  Sleeping most of the day but had agitation and altered mental status overnight last night.  Receiving IV Lasix, with good urine output though sometimes incontinent.  Ongoing dyspnea and weakness precluding participation in PT.  Currently on 2.5 L by NC.  Patient is not using call light.  Discussed with charge RN.  Patient will be transferred to a room closer to nursing station.    Data reviewed today: I reviewed all medications, new labs and imaging results over the last 24 hours. I personally reviewed the chest x-ray image(s) showing Consolidation and infiltrate in  left upper lobe, consistent with previous imaging.    Physical Exam   Vital Signs: Temp: 99.1  F (37.3  C) Temp src: Axillary BP: 122/74 Pulse: 88   Resp: 18 SpO2: 93 % O2 Device: Oxymask Oxygen Delivery: 5 LPM  Weight: 117 lbs 3.2 oz  Physical Exam  Constitutional:       General: She is sleeping. She is not in acute distress.     Appearance: She is ill-appearing.      Comments: O2 nonrebreather mask in place   Cardiovascular:      Rate and Rhythm: Rhythm regularly irregular.   Pulmonary:      Effort: Tachypnea present.      Breath sounds: Rhonchi present.   Musculoskeletal:      Right lower leg: No edema.      Left lower leg: No edema.   Skin:     General: Skin is dry.      Findings: Bruising and wound present.          Neurological:      Mental Status: She is confused.      Cranial Nerves: No dysarthria.           Data   Recent Labs   Lab 04/10/22  1453 04/10/22  1452 04/09/22  1304 04/09/22  0628 04/08/22  2045 04/08/22  1454 04/08/22  0443   WBC 39.2*  --   --  35.3*  --   --  32.7*   HGB 11.2*  --   --  10.9*  --   --  10.1*   MCV 84  --   --  86  --   --  85     --   --  296  --   --  225   NA  --  144  --  142  --   --  141   POTASSIUM  --  3.5  --  4.0 3.3*   < > 3.1*   CHLORIDE  --  112*  --  110*  --   --  110*   CO2  --  17*  --  17*  --   --  19*   BUN  --  34*  --  37*  --   --  37*   CR  --  1.65*  --  1.72*  --   --  1.80*   ANIONGAP  --  15  --  15  --   --  12   MESSI  --  8.2*  --  7.8*  --   --  7.8*   GLC  --  130* 119* 122  --   --  160*    < > = values in this interval not displayed.     Recent Results (from the past 24 hour(s))   XR Chest Port 1 View    Narrative    EXAM: XR CHEST PORTABLE 1 VIEW  LOCATION: [facility]  DATE/TIME: 04/10/2022, 7:56 AM    INDICATION: Shortness of breath.  COMPARISON: CT of the chest, abdomen, and pelvis performed 04/08/2022. Chest radiograph performed 04/07/2022.      Impression    IMPRESSION: Ill-defined consolidation and  infiltrate in the left upper lobe is again noted. Mild hyperinflation both lungs. The right lung is otherwise clear. Tortuous calcified thoracic aorta. Heart size has increased in prominence compared to   04/07/2022. Pulmonary vascularity is within normal limits where seen. Old right rib fractures. Right PICC line has been removed.       Medications     - MEDICATION INSTRUCTIONS -       sodium chloride 75 mL/hr at 04/10/22 0009       calcium carbonate 500 mg (elemental)  500 mg Oral Daily     doxycycline (VIBRAMYCIN) IV  100 mg Intravenous Q12H     famotidine  20 mg Oral Q48H     fluticasone-vilanterol  1 puff Inhalation At Bedtime    And     umeclidinium  1 puff Inhalation At Bedtime     furosemide  40 mg Oral Daily     gabapentin  200 mg Oral BID     guaiFENesin  1,200 mg Oral BID     ipratropium - albuterol 0.5 mg/2.5 mg/3 mL  3 mL Nebulization 4x daily     melatonin  5 mg Oral At Bedtime     metoprolol succinate ER  75 mg Oral At Bedtime     multivitamin RENAL  1 tablet Oral Daily     piperacillin-tazobactam  3.375 g Intravenous Q8H     predniSONE  40 mg Oral Daily     rivaroxaban ANTICOAGULANT  15 mg Oral At Bedtime     simvastatin  40 mg Oral At Bedtime     sodium chloride  3 mL Nebulization BID     tamoxifen  20 mg Oral QPM     traMADol  50 mg Oral BID

## 2022-04-10 NOTE — PLAN OF CARE
"  Problem: COPD (Chronic Obstructive Pulmonary Disease) Comorbidity  Goal: Maintenance of COPD Symptom Control  Outcome: Ongoing, Progressing     Problem: Heart Failure Comorbidity  Goal: Maintenance of Heart Failure Symptom Control  Outcome: Ongoing, Progressing   Goal Outcome Evaluation:        /69 (BP Location: Left leg, Patient Position: Semi-Scanlon's, Cuff Size: Adult Small)   Pulse 97   Temp 97.8  F (36.6  C) (Oral)   Resp 18   Ht 1.499 m (4' 11\")   Wt 53.2 kg (117 lb 3.2 oz)   SpO2 97%   BMI 23.67 kg/m                 "

## 2022-04-10 NOTE — PROGRESS NOTES
[facility] Brief Progress Note    Lactate checked earlier today d/t low BP reading came back borderline elevated at 2.1. She then received 500 mg bolus IVF and on recheck lactate stable at 2.2. Her vitals since fluid bolus have been WNL. On exam breathing comfortably with oxygen in place. She is being treated for known PNA but no other signs/sx of infection. Will start mIVF and continue to monitor clinically. No concern for sepsis at this time. Discussed plan with attending physician, Dr. Franklin.     Brittany Deshpande MD PGY-3  [facility]

## 2022-04-10 NOTE — PROVIDER NOTIFICATION
Resident on call notified due to pt having significantly increased work of breathing. PRN neb treatment given. Concerns for fluid overload with new orders for IV maintenance fluids overnight.     Resident on call came and saw patient. Fluids stopped and resident placed order for IV lasix.     Patient continued to have increased work of breathing after neb treatment. O2 increased to 5L and oxymask used instead of NC. Pt encouraged to sit up and take deep breaths. Pt became anxious and agitated and tried to stand up and take mask off, redirected by staff.     PIV found to be bad, so IV lasix unable to be administered. Report given to oncoming nurse and plan for another PIV to be placed.

## 2022-04-10 NOTE — PROVIDER NOTIFICATION
Notified provider about repeat vitals check. Mds have been updated and are aware of patient's AMS. Family here and updated. MD was told to give pt's daughter a call for an update.

## 2022-04-10 NOTE — PLAN OF CARE
Goal Outcome Evaluation:     Problem: COPD (Chronic Obstructive Pulmonary Disease) Comorbidity  Goal: Maintenance of COPD Symptom Control  Outcome: Ongoing, Not Progressing     Problem: Risk for Delirium  Goal: Improved Behavioral Control  Outcome: Ongoing, Progressing       Denies pain overnight. PRN tessalon pearls given for congested cough. VSS on 2-3L O2 NC. Desats with activity, requires 3L to recover. IV antibiotics infusing. IVMF at 75. Alert and oriented overnight, but forgetful at times. Emotionally labile. Telemetry monitoring continued. Alarms on for safety.

## 2022-04-10 NOTE — PROGRESS NOTES
Faculty Attestation    I saw and examined the patient.    I discussed the case with the resident physician, Dr. Pineda, and I assessed the patient on 4/10/2022.    I agree with the findings, assessment and plan.  Awake this morning, but sleeping this afternoon.  Confused but redirectable.  Mildly agitated.    BP stable in low 100s.  O2 req stable @ 2-3 liters.  RR in 20s.  Exam:    Lungs:  No basilar crackles.  Rhonchi anteriorly.    CV tachy.  Ext: bruising but no edema.  Abd soft.  WBC still high, but on steroids.  CRP decreasing.  CXR shows dense consolidation left upper lobe  Cr slightly better.  Still acidotic.  Lactate normal    Pneumonia:  Cont zosyn and doxy.  Consider pulm consult tomorrow if not clearly better.  ? Bronch.  COPD:  Cont steroids and nebs, though pred could be contributing to delerium.    Delerium:  Moving closer to nurses station.  Melatonin at bedtime and seroquel prn.  olanzaine helped on day of admission.  CKD:  Stable but stage 3/4.  CM:  Euvolemic.  Cont lasix 40 mg daily.  And toprol 75 mg.    Isma Franklin MD

## 2022-04-10 NOTE — PLAN OF CARE
Patient had had an AMS today - has been drowsy and confused, unaware that she is in the hospital or the situation. Pt on two IV abx. Did take some of her morning pills and had a few bites of breakfast. Up to commode x 4 this AM after receiving IV lasix. Pt not walking far due to dyspnea and weakness - only pivot transferring. Pt voiding adequate amounts. Pt has increased SOB after small movements and O2 sats drop. At rest pt is maintaing sats around 93% on 2.5 L. Scheduled nebs.Tachy with HR low 100s - tele NSR with BBB and PACs and PVCs. Alarms on for safety - pt not using call light, checking on pt frequently. Family was here visiting.

## 2022-04-10 NOTE — PROGRESS NOTES
Patient had a rough morning with increase in oxygen requirements and anxiety. Able to calm patient throughout the day and tolerated nebs as ordered. Currently on 3L NC with SPO2 around 94%. Will continue to follow and administer nebs as ordered.     Lm Felix, RT

## 2022-04-10 NOTE — PLAN OF CARE
Goal Outcome Evaluation:        Problem: COPD (Chronic Obstructive Pulmonary Disease) Comorbidity  Goal: Maintenance of COPD Symptom Control  Outcome: Ongoing, Progressing  Intervention: Maintain COPD-Symptom Control  Recent Flowsheet Documentation  Taken 4/9/2022 1630 by Kehinde Miguel RN  Medication Review/Management:   medications reviewed   high-risk medications identified     Problem: Heart Failure Comorbidity  Goal: Maintenance of Heart Failure Symptom Control  Outcome: Ongoing, Progressing  Intervention: Maintain Heart Failure-Management  Recent Flowsheet Documentation  Taken 4/9/2022 1630 by Kehinde Miguel RN  Medication Review/Management:   medications reviewed   high-risk medications identified     Pt is A&Ox4. Pt is emotionally labile and quick to anger. Verbal distraction, Care explained, therapeutic touch works best with pt for compliance and cooperation with cares. Protein intake was encourage with pt. Sepsis warning fired, resident informed, and new orders obtained. BP improved. Resident was informed of last lactic acid recheck at 2030. No new orders at this time. Discharge plane for 4/11 to home with HC services.

## 2022-04-11 ENCOUNTER — APPOINTMENT (OUTPATIENT)
Dept: OCCUPATIONAL THERAPY | Facility: CLINIC | Age: 77
DRG: 177 | End: 2022-04-11
Payer: MEDICARE

## 2022-04-11 ENCOUNTER — APPOINTMENT (OUTPATIENT)
Dept: PHYSICAL THERAPY | Facility: CLINIC | Age: 77
DRG: 177 | End: 2022-04-11
Payer: MEDICARE

## 2022-04-11 LAB
ALBUMIN UR-MCNC: NEGATIVE MG/DL
ANION GAP SERPL CALCULATED.3IONS-SCNC: 12 MMOL/L (ref 5–18)
APPEARANCE UR: CLEAR
ATRIAL RATE - MUSE: 80 BPM
BACTERIA #/AREA URNS HPF: ABNORMAL /HPF
BACTERIA SPT CULT: NORMAL
BASOPHILS # BLD AUTO: 0.1 10E3/UL (ref 0–0.2)
BASOPHILS # BLD MANUAL: 0 10E3/UL (ref 0–0.2)
BASOPHILS NFR BLD AUTO: 0 %
BASOPHILS NFR BLD MANUAL: 0 %
BILIRUB UR QL STRIP: NEGATIVE
BNP SERPL-MCNC: 1063 PG/ML (ref 0–144)
BUN SERPL-MCNC: 29 MG/DL (ref 8–28)
C DIFF TOX B STL QL: POSITIVE
C REACTIVE PROTEIN LHE: 19.6 MG/DL (ref 0–0.8)
CALCIUM SERPL-MCNC: 8 MG/DL (ref 8.5–10.5)
CHLORIDE BLD-SCNC: 113 MMOL/L (ref 98–107)
CO2 SERPL-SCNC: 19 MMOL/L (ref 22–31)
COLOR UR AUTO: COLORLESS
CREAT SERPL-MCNC: 1.29 MG/DL (ref 0.6–1.1)
DIASTOLIC BLOOD PRESSURE - MUSE: NORMAL MMHG
EOSINOPHIL # BLD AUTO: 0 10E3/UL (ref 0–0.7)
EOSINOPHIL # BLD MANUAL: 0 10E3/UL (ref 0–0.7)
EOSINOPHIL NFR BLD AUTO: 0 %
EOSINOPHIL NFR BLD MANUAL: 0 %
ERYTHROCYTE [DISTWIDTH] IN BLOOD BY AUTOMATED COUNT: 14.6 % (ref 10–15)
ERYTHROCYTE [DISTWIDTH] IN BLOOD BY AUTOMATED COUNT: 14.6 % (ref 10–15)
FRAGMENTS BLD QL SMEAR: SLIGHT
GFR SERPL CREATININE-BSD FRML MDRD: 43 ML/MIN/1.73M2
GLUCOSE BLD-MCNC: 112 MG/DL (ref 70–125)
GLUCOSE UR STRIP-MCNC: NEGATIVE MG/DL
GRAM STAIN RESULT: NORMAL
HCT VFR BLD AUTO: 31.3 % (ref 35–47)
HCT VFR BLD AUTO: 33.8 % (ref 35–47)
HGB BLD-MCNC: 10.4 G/DL (ref 11.7–15.7)
HGB BLD-MCNC: 11.7 G/DL (ref 11.7–15.7)
HGB UR QL STRIP: NEGATIVE
IMM GRANULOCYTES # BLD: 0.8 10E3/UL
IMM GRANULOCYTES NFR BLD: 2 %
INTERPRETATION ECG - MUSE: NORMAL
KETONES UR STRIP-MCNC: NEGATIVE MG/DL
LEUKOCYTE ESTERASE UR QL STRIP: NEGATIVE
LYMPHOCYTES # BLD AUTO: 0.7 10E3/UL (ref 0.8–5.3)
LYMPHOCYTES # BLD MANUAL: 2.3 10E3/UL (ref 0.8–5.3)
LYMPHOCYTES NFR BLD AUTO: 2 %
LYMPHOCYTES NFR BLD MANUAL: 7 %
MAGNESIUM SERPL-MCNC: 1.5 MG/DL (ref 1.8–2.6)
MCH RBC QN AUTO: 28.5 PG (ref 26.5–33)
MCH RBC QN AUTO: 28.7 PG (ref 26.5–33)
MCHC RBC AUTO-ENTMCNC: 33.2 G/DL (ref 31.5–36.5)
MCHC RBC AUTO-ENTMCNC: 34.6 G/DL (ref 31.5–36.5)
MCV RBC AUTO: 83 FL (ref 78–100)
MCV RBC AUTO: 86 FL (ref 78–100)
MONOCYTES # BLD AUTO: 1.1 10E3/UL (ref 0–1.3)
MONOCYTES # BLD MANUAL: 2 10E3/UL (ref 0–1.3)
MONOCYTES NFR BLD AUTO: 3 %
MONOCYTES NFR BLD MANUAL: 6 %
NEUTROPHILS # BLD AUTO: 33.7 10E3/UL (ref 1.6–8.3)
NEUTROPHILS # BLD MANUAL: 28.6 10E3/UL (ref 1.6–8.3)
NEUTROPHILS NFR BLD AUTO: 93 %
NEUTROPHILS NFR BLD MANUAL: 87 %
NITRATE UR QL: NEGATIVE
NRBC # BLD AUTO: 0 10E3/UL
NRBC BLD AUTO-RTO: 0 /100
P AXIS - MUSE: 67 DEGREES
PH UR STRIP: 5 [PH] (ref 5–7)
PHOSPHATE SERPL-MCNC: 3.1 MG/DL (ref 2.5–4.5)
PLAT MORPH BLD: ABNORMAL
PLATELET # BLD AUTO: 277 10E3/UL (ref 150–450)
PLATELET # BLD AUTO: 323 10E3/UL (ref 150–450)
POLYCHROMASIA BLD QL SMEAR: SLIGHT
POTASSIUM BLD-SCNC: 3.4 MMOL/L (ref 3.5–5)
POTASSIUM BLD-SCNC: 3.8 MMOL/L (ref 3.5–5)
PR INTERVAL - MUSE: 118 MS
QRS DURATION - MUSE: 146 MS
QT - MUSE: 454 MS
QTC - MUSE: 523 MS
R AXIS - MUSE: -68 DEGREES
RBC # BLD AUTO: 3.65 10E6/UL (ref 3.8–5.2)
RBC # BLD AUTO: 4.07 10E6/UL (ref 3.8–5.2)
RBC MORPH BLD: ABNORMAL
RBC URINE: <1 /HPF
RETICS # AUTO: 0.06 10E6/UL (ref 0.01–0.11)
RETICS/RBC NFR AUTO: 1.5 % (ref 0.8–2.7)
SODIUM SERPL-SCNC: 144 MMOL/L (ref 136–145)
SP GR UR STRIP: 1.01 (ref 1–1.03)
SQUAMOUS EPITHELIAL: <1 /HPF
SYSTOLIC BLOOD PRESSURE - MUSE: NORMAL MMHG
T AXIS - MUSE: 93 DEGREES
UROBILINOGEN UR STRIP-MCNC: <2 MG/DL
VENTRICULAR RATE- MUSE: 80 BPM
WBC # BLD AUTO: 32.9 10E3/UL (ref 4–11)
WBC # BLD AUTO: 36.3 10E3/UL (ref 4–11)
WBC URINE: <1 /HPF

## 2022-04-11 PROCEDURE — 250N000011 HC RX IP 250 OP 636: Performed by: STUDENT IN AN ORGANIZED HEALTH CARE EDUCATION/TRAINING PROGRAM

## 2022-04-11 PROCEDURE — 250N000013 HC RX MED GY IP 250 OP 250 PS 637: Performed by: FAMILY MEDICINE

## 2022-04-11 PROCEDURE — 85027 COMPLETE CBC AUTOMATED: CPT

## 2022-04-11 PROCEDURE — 99221 1ST HOSP IP/OBS SF/LOW 40: CPT | Performed by: INTERNAL MEDICINE

## 2022-04-11 PROCEDURE — 999N000206 HC STATISTICAL VASC ACCESS NURSE TIME, 61+ MINUTES

## 2022-04-11 PROCEDURE — 94640 AIRWAY INHALATION TREATMENT: CPT

## 2022-04-11 PROCEDURE — 97530 THERAPEUTIC ACTIVITIES: CPT | Mod: GP | Performed by: PHYSICAL THERAPIST

## 2022-04-11 PROCEDURE — 84132 ASSAY OF SERUM POTASSIUM: CPT | Performed by: STUDENT IN AN ORGANIZED HEALTH CARE EDUCATION/TRAINING PROGRAM

## 2022-04-11 PROCEDURE — 85045 AUTOMATED RETICULOCYTE COUNT: CPT

## 2022-04-11 PROCEDURE — 87070 CULTURE OTHR SPECIMN AEROBIC: CPT | Performed by: STUDENT IN AN ORGANIZED HEALTH CARE EDUCATION/TRAINING PROGRAM

## 2022-04-11 PROCEDURE — 120N000001 HC R&B MED SURG/OB

## 2022-04-11 PROCEDURE — 94640 AIRWAY INHALATION TREATMENT: CPT | Mod: 76

## 2022-04-11 PROCEDURE — 86140 C-REACTIVE PROTEIN: CPT | Performed by: STUDENT IN AN ORGANIZED HEALTH CARE EDUCATION/TRAINING PROGRAM

## 2022-04-11 PROCEDURE — 83880 ASSAY OF NATRIURETIC PEPTIDE: CPT

## 2022-04-11 PROCEDURE — 36569 INSJ PICC 5 YR+ W/O IMAGING: CPT

## 2022-04-11 PROCEDURE — 250N000012 HC RX MED GY IP 250 OP 636 PS 637: Performed by: STUDENT IN AN ORGANIZED HEALTH CARE EDUCATION/TRAINING PROGRAM

## 2022-04-11 PROCEDURE — 250N000011 HC RX IP 250 OP 636: Performed by: FAMILY MEDICINE

## 2022-04-11 PROCEDURE — 250N000009 HC RX 250: Performed by: FAMILY MEDICINE

## 2022-04-11 PROCEDURE — 250N000013 HC RX MED GY IP 250 OP 250 PS 637

## 2022-04-11 PROCEDURE — 250N000013 HC RX MED GY IP 250 OP 250 PS 637: Performed by: STUDENT IN AN ORGANIZED HEALTH CARE EDUCATION/TRAINING PROGRAM

## 2022-04-11 PROCEDURE — 80048 BASIC METABOLIC PNL TOTAL CA: CPT | Performed by: STUDENT IN AN ORGANIZED HEALTH CARE EDUCATION/TRAINING PROGRAM

## 2022-04-11 PROCEDURE — 97535 SELF CARE MNGMENT TRAINING: CPT | Mod: GO

## 2022-04-11 PROCEDURE — 84100 ASSAY OF PHOSPHORUS: CPT | Performed by: FAMILY MEDICINE

## 2022-04-11 PROCEDURE — 99233 SBSQ HOSP IP/OBS HIGH 50: CPT | Mod: GC

## 2022-04-11 PROCEDURE — 81001 URINALYSIS AUTO W/SCOPE: CPT

## 2022-04-11 PROCEDURE — 999N000157 HC STATISTIC RCP TIME EA 10 MIN

## 2022-04-11 PROCEDURE — 85025 COMPLETE CBC W/AUTO DIFF WBC: CPT | Performed by: STUDENT IN AN ORGANIZED HEALTH CARE EDUCATION/TRAINING PROGRAM

## 2022-04-11 PROCEDURE — 36415 COLL VENOUS BLD VENIPUNCTURE: CPT | Performed by: STUDENT IN AN ORGANIZED HEALTH CARE EDUCATION/TRAINING PROGRAM

## 2022-04-11 PROCEDURE — 83735 ASSAY OF MAGNESIUM: CPT | Performed by: STUDENT IN AN ORGANIZED HEALTH CARE EDUCATION/TRAINING PROGRAM

## 2022-04-11 PROCEDURE — 250N000011 HC RX IP 250 OP 636

## 2022-04-11 PROCEDURE — 87493 C DIFF AMPLIFIED PROBE: CPT

## 2022-04-11 PROCEDURE — 272N000451 HC KIT SHRLOCK 5FR POWER PICC DOUBLE LUMEN

## 2022-04-11 RX ORDER — LOPERAMIDE HCL 2 MG
2 CAPSULE ORAL 3 TIMES DAILY PRN
Status: COMPLETED | OUTPATIENT
Start: 2022-04-11 | End: 2022-04-11

## 2022-04-11 RX ORDER — POTASSIUM CHLORIDE 1500 MG/1
20 TABLET, EXTENDED RELEASE ORAL ONCE
Status: COMPLETED | OUTPATIENT
Start: 2022-04-11 | End: 2022-04-11

## 2022-04-11 RX ORDER — METOPROLOL SUCCINATE 25 MG/1
50 TABLET, EXTENDED RELEASE ORAL AT BEDTIME
Status: DISCONTINUED | OUTPATIENT
Start: 2022-04-11 | End: 2022-04-13

## 2022-04-11 RX ORDER — FUROSEMIDE 10 MG/ML
20 INJECTION INTRAMUSCULAR; INTRAVENOUS
Status: DISCONTINUED | OUTPATIENT
Start: 2022-04-12 | End: 2022-04-13

## 2022-04-11 RX ORDER — LIDOCAINE 40 MG/G
CREAM TOPICAL
Status: ACTIVE | OUTPATIENT
Start: 2022-04-11 | End: 2022-04-14

## 2022-04-11 RX ORDER — FUROSEMIDE 10 MG/ML
20 INJECTION INTRAMUSCULAR; INTRAVENOUS ONCE
Status: COMPLETED | OUTPATIENT
Start: 2022-04-11 | End: 2022-04-11

## 2022-04-11 RX ORDER — MAGNESIUM SULFATE HEPTAHYDRATE 40 MG/ML
2 INJECTION, SOLUTION INTRAVENOUS ONCE
Status: COMPLETED | OUTPATIENT
Start: 2022-04-11 | End: 2022-04-11

## 2022-04-11 RX ORDER — VANCOMYCIN HYDROCHLORIDE 125 MG/1
125 CAPSULE ORAL 4 TIMES DAILY
Status: DISCONTINUED | OUTPATIENT
Start: 2022-04-12 | End: 2022-04-21

## 2022-04-11 RX ADMIN — GUAIFENESIN 1200 MG: 600 TABLET ORAL at 22:17

## 2022-04-11 RX ADMIN — FLUTICASONE FUROATE AND VILANTEROL TRIFENATATE 1 PUFF: 200; 25 POWDER RESPIRATORY (INHALATION) at 22:16

## 2022-04-11 RX ADMIN — PIPERACILLIN AND TAZOBACTAM 3.38 G: 3; .375 INJECTION, POWDER, LYOPHILIZED, FOR SOLUTION INTRAVENOUS at 22:14

## 2022-04-11 RX ADMIN — SODIUM CHLORIDE SOLN NEBU 3% 3 ML: 3 NEBU SOLN at 07:30

## 2022-04-11 RX ADMIN — IPRATROPIUM BROMIDE AND ALBUTEROL SULFATE 3 ML: 2.5; .5 SOLUTION RESPIRATORY (INHALATION) at 16:15

## 2022-04-11 RX ADMIN — DOXYCYCLINE 100 MG: 100 INJECTION, POWDER, LYOPHILIZED, FOR SOLUTION INTRAVENOUS at 19:30

## 2022-04-11 RX ADMIN — QUETIAPINE FUMARATE 12.5 MG: 25 TABLET ORAL at 01:57

## 2022-04-11 RX ADMIN — Medication 5 MG: at 22:18

## 2022-04-11 RX ADMIN — PIPERACILLIN AND TAZOBACTAM 3.38 G: 3; .375 INJECTION, POWDER, LYOPHILIZED, FOR SOLUTION INTRAVENOUS at 13:18

## 2022-04-11 RX ADMIN — PREDNISONE 40 MG: 20 TABLET ORAL at 08:43

## 2022-04-11 RX ADMIN — MAGNESIUM SULFATE HEPTAHYDRATE 2 G: 40 INJECTION, SOLUTION INTRAVENOUS at 08:43

## 2022-04-11 RX ADMIN — GABAPENTIN 200 MG: 100 CAPSULE ORAL at 08:42

## 2022-04-11 RX ADMIN — LOPERAMIDE HYDROCHLORIDE 2 MG: 2 CAPSULE ORAL at 01:57

## 2022-04-11 RX ADMIN — FUROSEMIDE 20 MG: 10 INJECTION, SOLUTION INTRAVENOUS at 17:35

## 2022-04-11 RX ADMIN — DOXYCYCLINE 100 MG: 100 INJECTION, POWDER, LYOPHILIZED, FOR SOLUTION INTRAVENOUS at 06:30

## 2022-04-11 RX ADMIN — PIPERACILLIN AND TAZOBACTAM 3.38 G: 3; .375 INJECTION, POWDER, LYOPHILIZED, FOR SOLUTION INTRAVENOUS at 01:11

## 2022-04-11 RX ADMIN — FAMOTIDINE 20 MG: 20 TABLET ORAL at 22:18

## 2022-04-11 RX ADMIN — SIMVASTATIN 40 MG: 40 TABLET, FILM COATED ORAL at 22:17

## 2022-04-11 RX ADMIN — GUAIFENESIN 1200 MG: 600 TABLET ORAL at 08:42

## 2022-04-11 RX ADMIN — IPRATROPIUM BROMIDE AND ALBUTEROL SULFATE 3 ML: 2.5; .5 SOLUTION RESPIRATORY (INHALATION) at 07:31

## 2022-04-11 RX ADMIN — GABAPENTIN 200 MG: 100 CAPSULE ORAL at 22:17

## 2022-04-11 RX ADMIN — TRAMADOL HYDROCHLORIDE 50 MG: 50 TABLET, COATED ORAL at 08:43

## 2022-04-11 RX ADMIN — POTASSIUM CHLORIDE 20 MEQ: 20 TABLET, EXTENDED RELEASE ORAL at 13:18

## 2022-04-11 RX ADMIN — SODIUM CHLORIDE SOLN NEBU 3% 3 ML: 3 NEBU SOLN at 20:07

## 2022-04-11 RX ADMIN — UMECLIDINIUM 1 PUFF: 62.5 AEROSOL, POWDER ORAL at 22:16

## 2022-04-11 RX ADMIN — IPRATROPIUM BROMIDE AND ALBUTEROL SULFATE 3 ML: 2.5; .5 SOLUTION RESPIRATORY (INHALATION) at 20:07

## 2022-04-11 RX ADMIN — IPRATROPIUM BROMIDE AND ALBUTEROL SULFATE 3 ML: 2.5; .5 SOLUTION RESPIRATORY (INHALATION) at 11:44

## 2022-04-11 RX ADMIN — CALCIUM 500 MG: 500 TABLET ORAL at 08:43

## 2022-04-11 RX ADMIN — TAMOXIFEN CITRATE 20 MG: 10 TABLET ORAL at 22:17

## 2022-04-11 RX ADMIN — RIVAROXABAN 15 MG: 15 TABLET, FILM COATED ORAL at 22:17

## 2022-04-11 RX ADMIN — METOPROLOL SUCCINATE 50 MG: 25 TABLET, FILM COATED, EXTENDED RELEASE ORAL at 22:17

## 2022-04-11 RX ADMIN — TRAMADOL HYDROCHLORIDE 50 MG: 50 TABLET, COATED ORAL at 22:18

## 2022-04-11 ASSESSMENT — ACTIVITIES OF DAILY LIVING (ADL)
ADLS_ACUITY_SCORE: 13
ADLS_ACUITY_SCORE: 15
ADLS_ACUITY_SCORE: 13
ADLS_ACUITY_SCORE: 15
ADLS_ACUITY_SCORE: 13
ADLS_ACUITY_SCORE: 15
ADLS_ACUITY_SCORE: 15
ADLS_ACUITY_SCORE: 13
ADLS_ACUITY_SCORE: 15
ADLS_ACUITY_SCORE: 9
ADLS_ACUITY_SCORE: 15
ADLS_ACUITY_SCORE: 13
ADLS_ACUITY_SCORE: 13
ADLS_ACUITY_SCORE: 15
ADLS_ACUITY_SCORE: 13
ADLS_ACUITY_SCORE: 13
ADLS_ACUITY_SCORE: 15

## 2022-04-11 NOTE — PROGRESS NOTES
Patient arrived to the floor. Alert and oriented. Is having SOB with exertion, attempted to use the commode but un abale to void, urine sample not obtained at the moment.

## 2022-04-11 NOTE — CONSULTS
PULMONARY MEDICINE CONSULT  4/11/2022    Admit Date: 4/7/2022  CODE: Full Code    Reason for Consult: acute exacerbation of COPD    Assessment/Plan:   77 year old female with a history of severe asthma-COPD overlap syndrome with hyper-IgE followed at [facility], now in with severe left-sided CAP and AECOPD.    CAP, AECOPD: Baseline severe COPD, followed at [facility]. On high-dose triple-therapy and oxygen at baseline. Goal SpO2 88-92%. Other options outpatient include macrolide MWF, roflumilast, also has hyper-IgE, was discussing omalizumab or other biologic with her pulmonologist. Needs a formal health care directive/GOC discussion. Would benefit from pulmonary rehab.    Plan:  - oxygen goal SpO2 88-92%  - steroids  - antibiotics  - bronchodilators  - address goals of care/code status  - needs outpatient pulmonary rehab  - if on prednisone and scheduled duoneb consider holding inhaled therapies until discharge, but fine to continue  - follow up with outpatient pulmonologist, Dr. Dietrich at [facility] to discuss omalizumab or other biologic, possible macrolide or roflumilast, pulmonary rehab  - will follow    Neto Calles MD  Pulmonary and Critical Care Medicine  [facility]  Office [phone]  Pager [phone]  (he/him/his)                                                                                                                                                        HPI:   CCx: AECOPD    HPI: 77 year old female with a history of severe asthma-COPD overlap syndrome with hyper-IgE followed at [facility], now in with severe left-sided CAP and AECOPD. Dyspnea, cough, possibly at baseline. Dense left-sided pneumonia.                                                                                                                                                        Past Medical History:  Past Medical History:   Diagnosis Date     ANATOLIY (acute kidney injury) (H)      Allergic rhinitis      Arrhythmia       Atrial fibrillation with RVR (H)      Bacteremia      Breast cancer (H) 2017     Cardiomyopathy (H)      Centrilobular emphysema (H)      CHF (congestive heart failure) (H)      CKD (chronic kidney disease)      COPD (chronic obstructive pulmonary disease) (H)      Coronary artery disease      Depression      Dysphagia      E. coli sepsis (H)      Factor 5 Leiden mutation, heterozygous (H)      GERD (gastroesophageal reflux disease)      Hx of radiation therapy 2017     Hyperlipidemia      Hypertension      Hypokalemia      Hypomagnesemia      Idiopathic cardiomyopathy (H)      Left hip pain 4/30/2014     OAB (overactive bladder)      Osteoporosis      Sacral insufficiency fracture      Sinusitis      Syncope      Urge incontinence      Vocal cord dysfunction        Past Surgical History  Past Surgical History:   Procedure Laterality Date     ARTHROPLASTY REVISION HIP Left      BIOPSY BREAST Right 2017     BLADDER SURGERY      bladder interstim with removal     CARDIAC CATHETERIZATION       CATARACT EXTRACTION Left 07/18/2017     IR ABDOMINAL AORTOGRAM  4/16/2003     IR AORTIC ARCH 4 VESSEL ANGIOGRAM  4/16/2003     IR MISCELLANEOUS PROCEDURE  4/16/2003     LUMPECTOMY BREAST Left 06/2017    x2     PICC DOUBLE LUMEN PLACEMENT  4/11/2022          PICC TRIPLE LUMEN PLACEMENT  4/7/2022          ZZC OPEN TX FEMORAL FRACTURE DISTAL MED/LAT CONDYLE Left 10/28/2015    Procedure: OPEN REDUCTION INTERNAL FIXATION LEFT  PROXIMAL FEMUR PERIPROSTHETIC FRACTURE;  Surgeon: Yovanny Albarran MD;  Location: [facility];  Service: Orthopedics       Allergies:  Allergies   Allergen Reactions     Sulfa (Sulfonamide Antibiotics) [Sulfa Drugs] Rash     Headaches and dizziness.     Homeopathic Drugs [External Allergen Needs Reconciliation - See Comment] Unknown and Other (See Comments)     Comment: runny nose, Comment: runny nose     Mold Extracts [Molds & Smuts] Unknown     Morphine (Pf) [Morphine] Unknown     hallucinate      Lisinopril Cough, Unknown and Itching     Comment: cough, Comment: cough     Sulfacetamide Sodium [Sulfacetamide] Rash       PTA medications:  Medications Prior to Admission   Medication Sig Dispense Refill Last Dose     acetaminophen (TYLENOL) 500 MG tablet Take 1,000 mg by mouth every 8 hours as needed for mild pain or fever    4/7/2022 at AM     albuterol (PROAIR HFA/PROVENTIL HFA/VENTOLIN HFA) 108 (90 Base) MCG/ACT inhaler Inhale 2 puffs into the lungs every 4 hours as needed for shortness of breath / dyspnea 17 g 1 Unknown at not bringing     ascorbic acid, vitamin C, (VITAMIN C) 1000 MG tablet [ASCORBIC ACID, VITAMIN C, (VITAMIN C) 1000 MG TABLET] Take 1,000 mg by mouth daily. Airborne 1 tab daily   4/6/2022 at Unknown time     benzonatate (TESSALON) 100 MG capsule Take 1 capsule (100 mg) by mouth 3 times daily as needed for cough 90 capsule 1 Unknown at Unknown time     cetirizine (ZYRTEC) 10 MG tablet Take 10 mg by mouth daily as needed for allergies    Unknown at Unknown time     chlorhexidine (PERIDEX) 0.12 % solution [CHLORHEXIDINE (PERIDEX) 0.12 % SOLUTION] Apply 15 mL to the mouth or throat at bedtime as needed.    Unknown at Unknown time     cholecalciferol, vitamin D3, 1,000 unit tablet [CHOLECALCIFEROL, VITAMIN D3, 1,000 UNIT TABLET] Take 1,000 Units by mouth daily.   4/6/2022 at Unknown time     EPINEPHrine (EPIPEN/ADRENACLICK/AUVI-Q) 0.3 mg/0.3 mL injection Inject 0.3 mg into the muscle as needed for anaphylaxis    Unknown at Unknown time     famotidine (PEPCID) 20 MG tablet Take 1 tablet (20 mg) by mouth 2 times daily (Patient taking differently: Take 20 mg by mouth At Bedtime ) 180 tablet 0 4/6/2022 at PM     fluticasone (FLONASE) 50 MCG/ACT nasal spray Spray 2 sprays into both nostrils At Bedtime   4/6/2022 at PM, not bringing     Fluticasone-Umeclidin-Vilanterol (TRELEGY ELLIPTA) 200-62.5-25 MCG/INH oral inhaler Inhale 1 puff into the lungs At Bedtime   4/6/2022 at PM, not bringing      furosemide (LASIX) 40 MG tablet Take 1 tablet (40 mg) by mouth 2 times daily 180 tablet 1 4/6/2022 at 1200     gabapentin (NEURONTIN) 300 MG capsule Take 2 capsules (600 mg) by mouth 2 times daily 360 capsule 1 4/6/2022 at PM     ipratropium (ATROVENT HFA) 17 MCG/ACT inhaler [ATROVENT HFA 17 MCG/ACTUATION INHALER] USE 2 INHALATIONS EVERY 6 HOURS (Patient taking differently: Inhale 2 puffs into the lungs every 6 hours as needed for wheezing [ATROVENT HFA 17 MCG/ACTUATION INHALER] USE 2 INHALATIONS EVERY 6 HOURS) 77.4 g 3 4/6/2022 at not bringing     ipratropium-albuteroL (DUO-NEB) 0.5-2.5 mg/3 mL nebulizer [IPRATROPIUM-ALBUTEROL (DUO-NEB) 0.5-2.5 MG/3 ML NEBULIZER] Inhale 3 mL 4 (four) times a day. (Patient taking differently: Inhale 3 mLs into the lungs 4 times daily as needed for shortness of breath / dyspnea ) 120 vial 1 Unknown at Unknown time     loperamide (IMODIUM) 2 mg capsule Take 2-4 mg by mouth 4 times daily as needed for diarrhea    Unknown at Unknown time     magnesium chloride (SLOW-MAG) 64 mg TbEC delayed-release tablet [MAGNESIUM CHLORIDE (SLOW-MAG) 64 MG TBEC DELAYED-RELEASE TABLET] Take 1 tablet (64 mg total) by mouth daily. 90 tablet 3 4/6/2022 at Unknown time     metoprolol succinate ER (TOPROL-XL) 50 MG 24 hr tablet Take 1.5 tablets (75 mg) by mouth At Bedtime 135 tablet 3 4/6/2022 at PM     mirabegron (MYRBETRIQ) 50 MG 24 hr tablet [MYRBETRIQ 50 MG TB24 ER TABLET] TAKE 1 TABLET DAILY 90 tablet 3 4/6/2022 at PM     potassium chloride ER (K-TAB/KLOR-CON) 10 MEQ CR tablet Take 1 tablet (10 mEq) by mouth 2 times daily 180 tablet 3 4/6/2022 at 1200     rivaroxaban ANTICOAGULANT (XARELTO) 15 MG TABS tablet Take 1 tablet (15 mg) by mouth At Bedtime 90 tablet 3 4/5/2022 at PM     simvastatin (ZOCOR) 40 MG tablet Take 1 tablet (40 mg) by mouth At Bedtime 90 tablet 3 4/6/2022 at PM     solifenacin (VESICARE) 5 MG tablet Take 1 tablet (5 mg) by mouth At Bedtime 90 tablet 3 4/6/2022 at PM     tamoxifen  "(NOLVADEX) 20 MG tablet TAKE 1 TABLET DAILY 90 tablet 3 2022 at PM     traMADol (ULTRAM) 50 MG tablet Take 1 tablet (50 mg) by mouth 2 times daily as needed for severe pain 60 tablet 0 Unknown at Unknown time       Family Hx:  Family History   Problem Relation Age of Onset     Osteoporosis Other      Prostate Cancer Brother      Breast Cancer Maternal Aunt         age thought to be in her 70's-80's     Prostate Cancer Maternal Uncle        Social Hx:  Social History     Socioeconomic History     Marital status:      Spouse name: Not on file     Number of children: Not on file     Years of education: Not on file     Highest education level: Not on file   Occupational History     Not on file   Tobacco Use     Smoking status: Former Smoker     Quit date: 10/28/2007     Years since quittin.4     Smokeless tobacco: Former User     Quit date: 2004   Substance and Sexual Activity     Alcohol use: No     Drug use: No     Sexual activity: Not on file   Other Topics Concern     Not on file   Social History Narrative     and lives in home with 3 flight of steps     Social Determinants of Health     Financial Resource Strain: Not on file   Food Insecurity: Not on file   Transportation Needs: Not on file   Physical Activity: Not on file   Stress: Not on file   Social Connections: Not on file   Intimate Partner Violence: Not on file   Housing Stability: Not on file       Exam/Data:     Vitals  /60 (BP Location: Left arm)   Pulse 81   Temp 98.1  F (36.7  C) (Oral)   Resp 18   Ht 1.499 m (4' 11\")   Wt 54.6 kg (120 lb 6.4 oz)   SpO2 92%   BMI 24.32 kg/m       I/O last 3 completed shifts:  In: 150 [P.O.:150]  Out: 1425 [Urine:850; Other:275; Stool:300]  Weight change:   [unfilled]  EXAM:  /60 (BP Location: Left arm)   Pulse 81   Temp 98.1  F (36.7  C) (Oral)   Resp 18   Ht 1.499 m (4' 11\")   Wt 54.6 kg (120 lb 6.4 oz)   SpO2 92%   BMI 24.32 kg/m      Intake/Output last 3 " shifts:  I/O last 3 completed shifts:  In: 150 [P.O.:150]  Out: 1425 [Urine:850; Other:275; Stool:300]  Intake/Output this shift:  I/O this shift:  In: -   Out: 100 [Other:100]    Physical Exam  Gen: alert, oriented, no distress  HEENT: NT, no CHRISTINA  CV: RRR, no m/g/r  Resp: crackles left apex otherwise diminished bilaterally with prolonged expiratory phase  Abd: soft, nontender, BS+  Skin: no rashes or lesions  Ext: no edema  Neuro: PERRL, nonfocal exam    ROS: A complete 10-system review of systems was obtained and is negative with the exception of what is noted in the history of present illness.    Medications:       - MEDICATION INSTRUCTIONS -         calcium carbonate 500 mg (elemental)  500 mg Oral Daily     doxycycline (VIBRAMYCIN) IV  100 mg Intravenous Q12H     famotidine  20 mg Oral Q48H     fluticasone-vilanterol  1 puff Inhalation At Bedtime    And     umeclidinium  1 puff Inhalation At Bedtime     [START ON 4/12/2022] furosemide  20 mg Intravenous BID     [Held by provider] furosemide  40 mg Oral Daily     gabapentin  200 mg Oral BID     guaiFENesin  1,200 mg Oral BID     ipratropium - albuterol 0.5 mg/2.5 mg/3 mL  3 mL Nebulization 4x daily     melatonin  5 mg Oral At Bedtime     metoprolol succinate ER  50 mg Oral At Bedtime     multivitamin RENAL  1 tablet Oral Daily     piperacillin-tazobactam  3.375 g Intravenous Q8H     predniSONE  40 mg Oral Daily     rivaroxaban ANTICOAGULANT  15 mg Oral At Bedtime     simvastatin  40 mg Oral At Bedtime     sodium chloride  3 mL Nebulization BID     tamoxifen  20 mg Oral QPM     traMADol  50 mg Oral BID         DATA  All laboratory and radiology has been personally reviewed by myself today.  Recent Labs   Lab 04/11/22  1316   WBC 36.3*   HGB 11.7   HCT 33.8*        Recent Labs   Lab 04/11/22  0417 04/10/22  1452 04/09/22  0628    144 142   CO2 19* 17* 17*   BUN 29* 34* 37*         PFT DATA (October 2019):  FEV1 0.85 with significant BD response, DLCO  36%    IMAGING:   CT C/A/P:  FINDINGS:   LUNGS AND PLEURA: Moderate irregular consolidation in the posterior left upper lobe. Confluent emphysema. Mild bibasilar atelectasis/scar. Trace pleural effusions. Stable mild diffuse bronchial wall thickening. A few stable benign pulmonary nodules.     MEDIASTINUM/AXILLAE: Right PICC. Mediastinal lymphadenopathy with the largest largest being a right paratracheal node measuring 1.6 cm in short axis, image 48:3. Small pericardial effusion. The main pulmonary artery is enlarged at 3.0 cm which may be   seen with pulmonary artery hypertension.     CORONARY ARTERY CALCIFICATION: Moderate.     HEPATOBILIARY: Normal.     PANCREAS: Normal.     SPLEEN: Normal.     ADRENAL GLANDS: Normal.     KIDNEYS/BLADDER: Low-lying right kidney. Subcentimeter renal hypodensities which are too small to characterize. No hydronephrosis.     BOWEL: No obstruction or inflammatory change. Normal appendix.     LYMPH NODES: Normal.     VASCULATURE: Stable ectasia of the abdominal aorta measuring 2.9 x 2.3 cm, image 169:3. Moderate atherosclerosis.     PELVIC ORGANS: Normal.     MUSCULOSKELETAL: Left hip arthroplasty. Osseous demineralization. Degenerative changes of the spine. Remote left rib fractures. T12 compression fracture with 80% loss of height which is likely remote.                                                                      IMPRESSION:  1.  Moderate left upper lobe consolidation. This is consistent with pneumonia. Recommend a follow-up chest radiograph in 4-6 weeks to document resolution.  2.  Mediastinal lymphadenopathy.  3.  No acute abnormality in the abdomen or pelvis.

## 2022-04-11 NOTE — PLAN OF CARE
Goal Outcome Evaluation:    Pt is alert to self & occasionally to situation or place. Pt is still primarily confused the majority of the time. Pt pulled out her PICC recently and currently has 2 peripheral IV's. The one on the right wrist flows well and does not appear to be infiltrated. The IV in the right upper arm does look red & angry at the site & she complains of pain with attempts to flush it. Pt was compliant with taking her HS meds tonight, though it took awhile to wake her enough to do it.

## 2022-04-11 NOTE — PROGRESS NOTES
Care Management Follow Up    Length of Stay (days): 4    Expected Discharge Date: 04/14/2022     Concerns to be Addressed: denies needs/concerns at this time, care coordination/care conferences, discharge planning     Patient plan of care discussed at interdisciplinary rounds: Yes    Anticipated Discharge Disposition: Home Care     Anticipated Discharge Services: None  Anticipated Discharge DME: None    Additional Information:  SW reviewed chart.  Pt from home with  who assists with ADLs/IADLs.  Pt previously open to [facility] who will be able to continue to provide services upon discharge.  CM following to assist with discharge planning.       BRADEN Hart

## 2022-04-11 NOTE — PROGRESS NOTES
[facility]    Progress Note - Hospitalist Service       Date of Admission:  4/7/2022    Assessment & Plan    77-year-old female with PMH significant for HFrEF, HTN, factor V Leiden on Xarelto, CKD, osteoporosis, and 35+ pound unintentional weight loss in the past 1 year who was admitted on 4/7/2022 with shortness of breath and was found to have radiographic evidence consistent with pneumonia.  Noncontrast CT chest/abdomen/pelvis without obvious masses to suggest malignancy.  EKG showed A. fib with RVR.  Due to ANATOLIY on CKD, furosemide was held initially but restarted HD3.  Echocardiogram showed EF 35-40%, down from 55 to 60% 7/26 2021.  Overnight 4/8-4/9 she developed new onset agitation and confusion.  Some medications known to be associated with delirium were discontinued.  She pulled out her PICC line and received IM Zyprexa after which she slept well.  Barriers to discharge include ongoing delirium, poor oral intake in the setting of unintentional weight loss, receiving IV fluids and antibiotics, labile vital signs. Does not seem to be following usual course. Pulmonology consulted 4/11.     Acute Hypoxic Respiratory Failure  Community-acquired left mid and upper lobe pneumonia  COPD Exacerbation  Hyperchloremic metabolic acidosis  Elevated lactate, improved  Leukocytosis, mildly improving  Persistent leukocytosis but in the setting of steroids for COPD exacerbation. CRP up trending today. Respiratory status does not seem to be improving as expected. Repeat CXR 4/10 showed continued infiltrate, enlarged cardiac silhouette. Does use some oxygen intermittently at home. OP Pulmonology note 1/22 ordered O2 for sats 86% on RA. Having increasing oxygen needs overnight to 3-4 L, likely related to CHF.  - Pulmonology consulted, appreciate recs  -Trend CRP, WBC  - Continue IV Zosyn and IV Doxycycline   - If patient spikes a fever above 100.4, please collect blood cultures x2 and broaden  antibiotic coverage  - Continue oral prednisone 40 mg daily to complete 5-day course  - Continuous pulse oximetry  - Supplemental O2 by NC, keep SPO2 88% to 92%. Avoid over oxygenation  - Scheduled Duonebs Q4hrs with Mucomyst  - PRN Albuterol Nebs Q2Hrs  - Continue Trelegy Ellipta 1 puff at bedtime   - Mucinex BID   -Tessalon PRN  - BMP, CBC, CRP  - Consider ID consult tomorrow    HFrEF   PTA took furosemide 80 mg total daily.  Received IV Lasix 20 mg 4/10.  Echo showed reduced EF from prior.  Hyperinflated lungs without radiographic evidence to suggest heart failure exacerbation on CXR 4/10.  Could consider stress test prior to discharge pending clinical improvement. Sounds wet and volume up, soft blood pressures complicating diuresis. Has previously gone to 70's/40's.   - PTA Lasix 40 mg daily-did not get today due to hypotension  - Decrease Metoprolol to 50 mg XL daily in hopes of blood pressure allowing for diuresis  - Daily weights  - Strict I/O, pure wick if patient tolerates  - Stop IVF    Delirium, Improving  Agitation  Gabapentin was decreased to 200 mg twice daily.  Vesicare and Myrbetriq were discontinued.  Patient's room was changed to be closer to nursing station.  No indication for one-to-one at this time.  Reevaluate daily.  - Delirium precautions  - Bundle cares  - Continue melatonin 5 mg at bedtime  - Seroquel 12.5 mg as needed for agitation  - Open blinds, turn on lights during the day  - Check UA    Diarrhea  Patient began having loose stools overnight. She has had 3 loose/diarrhea stools since 0300 today. She has been incontinent which is a change for her. She is not on stool softeners or laxatives. With elevated WBC, CRP, will check for infection.  - Check C-diff     Weight loss  Multifactorial in the setting of COPD, decreased oral intake  -RD consulted, appreciate recommendations and assistance    Afib w/ RVR on Xarelto  Tachycardia  Prolonged QTC  Hx TIA, Factor V Leiden  Metoprolol was  initially on hold for hypotension, now resumed. Did have episode of Afib w RVR to 160's day 1 after Metoprolol was held for hypotension. Telemetry showing sinus tachycardia with HR low 80s, BBB, PAC, PVCs.  - Continuous telemetry  - Avoid QT prolonging medications  - Add on Mg, keep above 2  -Trend and replete electrolytes as needed   - Decrease Metoprolol to 50 mg XR daily  - Continue Xarelto as at home     ANATOLIY on CKD, Improving  Admission Cr 2.44 BUN 36 with a variable baseline 1.5-2.0.  Creatinine has been downtrending with IV hydration.    - Daily BMP  - Avoid nephrotoxins    Overactive bladder  Discontinued PTA Mirabegron 50 mg and Solifenacin 5 mg in the setting of acute delirium.  -Pure wick if patient tolerates     Normocytic normochromic anemia, stable  Admission Hgb of 10.9.  Hgbs 13.3 - 9.8 in the 2 months PTA. No ssx GI bleed.  -CBC as above    Fragile skin  -Wrap IV, avoid adhesives  -WOC    Chronic Conditions and Medications  Breast Cancer  DCIS, ER/DE positive, HER-2 negative, s/p left breast lumpectomy and left axillary sentinel lymph node biopsy.  - Cont. PTA Tamoxifen 20 daily  Osteoporosis  - Hold PTA VitD and Ca supplementation  Allergies  - Hold PTA Cetirizine and Flonase  GERD  - Cont. PTA 20 mg pepcid at bedtime  Chronic Pain secondary to T12 compression fracture  - Continue gabapentin 200 mg BID (dose decreased this admission)  - Continue Tramadol 50 mg Q12h as at home     Diet: Snacks/Supplements Adult: Ensure Enlive; With Meals  Regular Diet Adult    DVT Prophylaxis: Xarelto  Hayes Catheter: Not present  Fluids: PO  Central Lines: None  Cardiac Monitoring: ACTIVE order. Indication: QTc prolonging medication (48 hours)  Code Status: Full Code      Disposition Plan   Expected Discharge: 04/14/2022     Anticipated discharge location: home with family;home with help/services       The patient's care was discussed with the Attending Physician, Dr. Janae Perez.    I spoke with Leelee, the  patient's daughter, in person and on the phone.     Irene Lombardo MD   [facility]  [facility]      ______________________________________________________________________    Interval History   Discussed weekend events with patient's daughter, Leelee. Leelee feels Stephanie is not following her usual hospital course and is concerned. Overnight, nursing reports some anxiety but not confused. She is having a lot of pain r/t her PIV which infiltrated and lab draws. She also reports a loose, large stool at 0300, again later in the morning, and following her daughter's arrival. She was incontinent which is a big change. Her breathing is not much improved.     Physical Exam   Vital Signs: Temp: 97.9  F (36.6  C) Temp src: Oral BP: 97/55 Pulse: 84   Resp: 18 SpO2: 96 % O2 Device: Nasal cannula Oxygen Delivery: 4 LPM  Weight: 120 lbs 6.4 oz  Physical Exam   Constitutional: Awake, alert, cooperative, oriented to person, place, and day.   Eyes: Lids and lashes normal, extra ocular muscles intact, sclera clear, conjunctiva normal.  ENT: Normocephalic, without obvious abnormality, atraumatic  Lungs: Sounds wet, gurgling. Appears dyspneic with speaking. LS diminished throughout. No rhonchi or wheezing on exam.   Cardiovascular: Regular rate and rhythm, normal S1 and S2, +JOHN PAUL  Abdomen: Soft, non tender, no distention or guarding  Musculoskeletal: Able to ambulate  Neurologic: Awake and alert. Cranial nerves II-XII are grossly intact.  Neuropsychiatric: Normal affect, mood, orientation, memory seems poor  Skin: Very fragile, bruising      Data   Recent Labs   Lab 04/11/22  0417 04/10/22  1453 04/10/22  1452 04/09/22  1304 04/09/22  0628   WBC 32.9* 39.2*  --   --  35.3*   HGB 10.4* 11.2*  --   --  10.9*   MCV 86 84  --   --  86    317  --   --  296     --  144  --  142   POTASSIUM 3.4*  --  3.5  --  4.0   CHLORIDE 113*  --  112*  --  110*   CO2 19*  --  17*  --  17*   BUN 29*  --  34*   --  37*   CR 1.29*  --  1.65*  --  1.72*   ANIONGAP 12  --  15  --  15   MESSI 8.0*  --  8.2*  --  7.8*     --  130* 119* 122     No results found for this or any previous visit (from the past 24 hour(s)).  Medications     - MEDICATION INSTRUCTIONS -         calcium carbonate 500 mg (elemental)  500 mg Oral Daily     doxycycline (VIBRAMYCIN) IV  100 mg Intravenous Q12H     famotidine  20 mg Oral Q48H     fluticasone-vilanterol  1 puff Inhalation At Bedtime    And     umeclidinium  1 puff Inhalation At Bedtime     furosemide  40 mg Oral Daily     gabapentin  200 mg Oral BID     guaiFENesin  1,200 mg Oral BID     ipratropium - albuterol 0.5 mg/2.5 mg/3 mL  3 mL Nebulization 4x daily     melatonin  5 mg Oral At Bedtime     metoprolol succinate ER  75 mg Oral At Bedtime     multivitamin RENAL  1 tablet Oral Daily     piperacillin-tazobactam  3.375 g Intravenous Q8H     predniSONE  40 mg Oral Daily     rivaroxaban ANTICOAGULANT  15 mg Oral At Bedtime     simvastatin  40 mg Oral At Bedtime     sodium chloride  3 mL Nebulization BID     tamoxifen  20 mg Oral QPM     traMADol  50 mg Oral BID

## 2022-04-11 NOTE — PROCEDURES
"PICC Line Insertion Procedure Note  Pt. Name: Stephanie León  MRN: [id number]         Procedure: Insertion of a  dual Lumen  5 fr  Bard SOLO (valved) Power PICC, Lot number [id number]    Indications: Access    Contraindications : Left Mastectomy with node dissection    Procedure Details   Patient identified with 2 identifiers and \"Time Out\" conducted.  .     Central line insertion bundle followed: hand hygeine performed prior to procedure, site cleansed with cholraprep, hat, mask, sterile gloves,sterile gown worn, patient draped with maximum barrier head to toe drape, sterile field maintained.    The vein was assessed and found to be compressible and of adequate size. 3 ml 1% Lidocaine administered sq to the insertion site. A 5 Fr PICC was inserted into the Brachial vein of the right arm with ultrasound guidance. 1 attempt(s) required to access vein.   Catheter threaded without difficulty. Good blood return noted.    Modified Seldinger Technique used for insertion.    The 8 sharps that are included in the PICC insertion kit were accounted for and disposed of in the sharps container prior to breakdown of the sterile field.    Catheter secured with Statlock, biopatch and Tegaderm dressing applied.    Findings:  Total catheter length  29 cm, with 0 cm exposed. Mid upper arm circumference is 23 cm. Catheter was flushed with 20 cc NS. Patient  tolerated procedure well.    Tip placement verified by 3CG Tip Confirmation system . Tip placement in the SVC.    CLABSI prevention brochure left at bedside.    Patient's primary RN notified PICC is ready for use.    Comments:        NEYMAR Miranda, RN  [facility]  "

## 2022-04-11 NOTE — PROVIDER NOTIFICATION
04/10/22 2016   Tech Time   $Tech Time (10 minute increments) 4   Nebulizer Assessment & Treatment   $RT Use ONLY Delivery Method Nebulizer - Additional   Nebulizer Device Mask   Pretreatment Heart Rate (beats/min) 93   Pretreatment Resp Rate (breaths/min) 26   Pretreatment O2 sats - (TCU only) 97   Pretreat Breath Sounds - Bilat - All Lobes crackles, coarse   Breath Sounds Post-Respiratory Treatment   Posttreatment Heart Rate (beats/min) 91   Posttreatment Resp Rate (breaths/min) 24   Post treatment O2 Sats - (TCU only) 98   Posttreatment Assessment (SVN) breath sounds unchanged   Signs of Intolerance (SVN) none   Breath Sounds Posttreatment All Fields All Fields   Breath Sounds Posttreatment All Fields no change   Breath Sounds Posttreatment ZONIA crackles, coarse   Breath Sounds Posttreatment LLL diminished   Breath Sounds Posttreatment RLL diminished

## 2022-04-11 NOTE — PLAN OF CARE
"  Problem: COPD (Chronic Obstructive Pulmonary Disease) Comorbidity  Goal: Maintenance of COPD Symptom Control  Outcome: Ongoing, Progressing     Problem: Heart Failure Comorbidity  Goal: Maintenance of Heart Failure Symptom Control  Outcome: Ongoing, Progressing   Goal Outcome Evaluation:           /51 (BP Location: Left leg)   Pulse 73   Temp 99.1  F (37.3  C) (Oral)   Resp 18   Ht 1.499 m (4' 11\")   Wt 53.2 kg (117 lb 3.2 oz)   SpO2 91%   BMI 23.67 kg/m              "

## 2022-04-11 NOTE — PLAN OF CARE
Goal Outcome Evaluation:        Problem: Risk for Delirium  Goal: Improved Sleep  Outcome: Ongoing, Progressing             Pt had episode of incontinent diarrhea at start of shift. Incontinence care provided. Resident paged for PRN imodium order. Imodium given with positive effect. Resident states to page back if there was concern for Cdiff. Pt was also anxious about diarrhea and plan of care in general so PRN Seroquel was used with positive effect. Pt is sleeping for remainder of the shift. Continues on 5 lpm via NC with saturations above 90%. Congested, non productive cough. WBC improved, 32.9 this AM. Continues on K and phos protocols.

## 2022-04-12 ENCOUNTER — MEDICAL CORRESPONDENCE (OUTPATIENT)
Dept: HEALTH INFORMATION MANAGEMENT | Facility: CLINIC | Age: 77
End: 2022-04-12

## 2022-04-12 LAB
ANION GAP SERPL CALCULATED.3IONS-SCNC: 13 MMOL/L (ref 5–18)
BACTERIA BLD CULT: NO GROWTH
BASOPHILS # BLD AUTO: 0.1 10E3/UL (ref 0–0.2)
BASOPHILS NFR BLD AUTO: 0 %
BUN SERPL-MCNC: 26 MG/DL (ref 8–28)
C REACTIVE PROTEIN LHE: 20.9 MG/DL (ref 0–0.8)
CALCIUM SERPL-MCNC: 8.5 MG/DL (ref 8.5–10.5)
CHLORIDE BLD-SCNC: 111 MMOL/L (ref 98–107)
CO2 SERPL-SCNC: 21 MMOL/L (ref 22–31)
CREAT SERPL-MCNC: 1.23 MG/DL (ref 0.6–1.1)
EOSINOPHIL # BLD AUTO: 0 10E3/UL (ref 0–0.7)
EOSINOPHIL NFR BLD AUTO: 0 %
ERYTHROCYTE [DISTWIDTH] IN BLOOD BY AUTOMATED COUNT: 14.6 % (ref 10–15)
GFR SERPL CREATININE-BSD FRML MDRD: 45 ML/MIN/1.73M2
GLUCOSE BLD-MCNC: 85 MG/DL (ref 70–125)
HCT VFR BLD AUTO: 25.2 % (ref 35–47)
HGB BLD-MCNC: 8.8 G/DL (ref 11.7–15.7)
IMM GRANULOCYTES # BLD: 1.2 10E3/UL
IMM GRANULOCYTES NFR BLD: 4 %
LYMPHOCYTES # BLD AUTO: 1.7 10E3/UL (ref 0.8–5.3)
LYMPHOCYTES NFR BLD AUTO: 6 %
MAGNESIUM SERPL-MCNC: 2 MG/DL (ref 1.8–2.6)
MCH RBC QN AUTO: 28.9 PG (ref 26.5–33)
MCHC RBC AUTO-ENTMCNC: 34.9 G/DL (ref 31.5–36.5)
MCV RBC AUTO: 83 FL (ref 78–100)
MONOCYTES # BLD AUTO: 2 10E3/UL (ref 0–1.3)
MONOCYTES NFR BLD AUTO: 7 %
NEUTROPHILS # BLD AUTO: 23.9 10E3/UL (ref 1.6–8.3)
NEUTROPHILS NFR BLD AUTO: 83 %
NRBC # BLD AUTO: 0 10E3/UL
NRBC BLD AUTO-RTO: 0 /100
PATH REPORT.COMMENTS IMP SPEC: NORMAL
PATH REPORT.COMMENTS IMP SPEC: NORMAL
PATH REPORT.FINAL DX SPEC: NORMAL
PATH REPORT.MICROSCOPIC SPEC OTHER STN: NORMAL
PATH REPORT.RELEVANT HX SPEC: NORMAL
PHOSPHATE SERPL-MCNC: 3.7 MG/DL (ref 2.5–4.5)
PLATELET # BLD AUTO: 301 10E3/UL (ref 150–450)
POTASSIUM BLD-SCNC: 3.4 MMOL/L (ref 3.5–5)
RBC # BLD AUTO: 3.05 10E6/UL (ref 3.8–5.2)
SODIUM SERPL-SCNC: 145 MMOL/L (ref 136–145)
WBC # BLD AUTO: 28.8 10E3/UL (ref 4–11)

## 2022-04-12 PROCEDURE — 84132 ASSAY OF SERUM POTASSIUM: CPT | Performed by: STUDENT IN AN ORGANIZED HEALTH CARE EDUCATION/TRAINING PROGRAM

## 2022-04-12 PROCEDURE — 84132 ASSAY OF SERUM POTASSIUM: CPT

## 2022-04-12 PROCEDURE — 250N000011 HC RX IP 250 OP 636: Performed by: STUDENT IN AN ORGANIZED HEALTH CARE EDUCATION/TRAINING PROGRAM

## 2022-04-12 PROCEDURE — 250N000011 HC RX IP 250 OP 636: Performed by: FAMILY MEDICINE

## 2022-04-12 PROCEDURE — 84100 ASSAY OF PHOSPHORUS: CPT | Performed by: STUDENT IN AN ORGANIZED HEALTH CARE EDUCATION/TRAINING PROGRAM

## 2022-04-12 PROCEDURE — 99233 SBSQ HOSP IP/OBS HIGH 50: CPT | Performed by: INTERNAL MEDICINE

## 2022-04-12 PROCEDURE — 120N000001 HC R&B MED SURG/OB

## 2022-04-12 PROCEDURE — 250N000013 HC RX MED GY IP 250 OP 250 PS 637

## 2022-04-12 PROCEDURE — 99222 1ST HOSP IP/OBS MODERATE 55: CPT | Performed by: CLINICAL NURSE SPECIALIST

## 2022-04-12 PROCEDURE — 85025 COMPLETE CBC W/AUTO DIFF WBC: CPT | Performed by: STUDENT IN AN ORGANIZED HEALTH CARE EDUCATION/TRAINING PROGRAM

## 2022-04-12 PROCEDURE — 250N000009 HC RX 250: Performed by: FAMILY MEDICINE

## 2022-04-12 PROCEDURE — 250N000013 HC RX MED GY IP 250 OP 250 PS 637: Performed by: STUDENT IN AN ORGANIZED HEALTH CARE EDUCATION/TRAINING PROGRAM

## 2022-04-12 PROCEDURE — 94640 AIRWAY INHALATION TREATMENT: CPT | Mod: 76

## 2022-04-12 PROCEDURE — G0463 HOSPITAL OUTPT CLINIC VISIT: HCPCS

## 2022-04-12 PROCEDURE — 99233 SBSQ HOSP IP/OBS HIGH 50: CPT | Mod: GC

## 2022-04-12 PROCEDURE — 86140 C-REACTIVE PROTEIN: CPT | Performed by: STUDENT IN AN ORGANIZED HEALTH CARE EDUCATION/TRAINING PROGRAM

## 2022-04-12 PROCEDURE — 250N000009 HC RX 250: Performed by: STUDENT IN AN ORGANIZED HEALTH CARE EDUCATION/TRAINING PROGRAM

## 2022-04-12 PROCEDURE — 999N000032 HC STATISTIC CHRONIC DISEASE SPECIALIST RT CONSULT

## 2022-04-12 PROCEDURE — 250N000011 HC RX IP 250 OP 636

## 2022-04-12 PROCEDURE — 83735 ASSAY OF MAGNESIUM: CPT | Performed by: STUDENT IN AN ORGANIZED HEALTH CARE EDUCATION/TRAINING PROGRAM

## 2022-04-12 PROCEDURE — 80048 BASIC METABOLIC PNL TOTAL CA: CPT | Performed by: STUDENT IN AN ORGANIZED HEALTH CARE EDUCATION/TRAINING PROGRAM

## 2022-04-12 PROCEDURE — 250N000012 HC RX MED GY IP 250 OP 636 PS 637: Performed by: STUDENT IN AN ORGANIZED HEALTH CARE EDUCATION/TRAINING PROGRAM

## 2022-04-12 PROCEDURE — 250N000013 HC RX MED GY IP 250 OP 250 PS 637: Performed by: FAMILY MEDICINE

## 2022-04-12 PROCEDURE — 999N000033 HC STATISTIC CHRONIC PULMONARY DISEASE SPECIALIST

## 2022-04-12 PROCEDURE — 85060 BLOOD SMEAR INTERPRETATION: CPT | Performed by: PATHOLOGY

## 2022-04-12 RX ORDER — DOXYCYCLINE HYCLATE 50 MG/1
100 CAPSULE ORAL EVERY 12 HOURS SCHEDULED
Status: DISPENSED | OUTPATIENT
Start: 2022-04-12 | End: 2022-04-14

## 2022-04-12 RX ORDER — PREDNISONE 5 MG/1
5 TABLET ORAL DAILY
Status: DISCONTINUED | OUTPATIENT
Start: 2022-04-22 | End: 2022-04-22 | Stop reason: HOSPADM

## 2022-04-12 RX ORDER — MAGNESIUM OXIDE 400 MG/1
400 TABLET ORAL 2 TIMES DAILY
Status: DISCONTINUED | OUTPATIENT
Start: 2022-04-12 | End: 2022-04-12

## 2022-04-12 RX ORDER — POTASSIUM CHLORIDE 1500 MG/1
20 TABLET, EXTENDED RELEASE ORAL ONCE
Status: COMPLETED | OUTPATIENT
Start: 2022-04-12 | End: 2022-04-12

## 2022-04-12 RX ORDER — PREDNISONE 20 MG/1
20 TABLET ORAL DAILY
Status: COMPLETED | OUTPATIENT
Start: 2022-04-16 | End: 2022-04-18

## 2022-04-12 RX ORDER — PREDNISONE 5 MG/1
10 TABLET ORAL DAILY
Status: COMPLETED | OUTPATIENT
Start: 2022-04-19 | End: 2022-04-21

## 2022-04-12 RX ORDER — POTASSIUM CHLORIDE 1.5 G/1.58G
20 POWDER, FOR SOLUTION ORAL ONCE
Status: COMPLETED | OUTPATIENT
Start: 2022-04-12 | End: 2022-04-12

## 2022-04-12 RX ORDER — MAGNESIUM SULFATE HEPTAHYDRATE 40 MG/ML
2 INJECTION, SOLUTION INTRAVENOUS ONCE
Status: COMPLETED | OUTPATIENT
Start: 2022-04-12 | End: 2022-04-12

## 2022-04-12 RX ORDER — POTASSIUM CHLORIDE 1.5 G/1.58G
20 POWDER, FOR SOLUTION ORAL ONCE
Status: COMPLETED | OUTPATIENT
Start: 2022-04-12 | End: 2022-04-13

## 2022-04-12 RX ADMIN — VANCOMYCIN HYDROCHLORIDE 125 MG: 125 CAPSULE ORAL at 02:03

## 2022-04-12 RX ADMIN — SIMVASTATIN 40 MG: 40 TABLET, FILM COATED ORAL at 21:03

## 2022-04-12 RX ADMIN — DOXYCYCLINE HYCLATE 100 MG: 50 CAPSULE ORAL at 21:03

## 2022-04-12 RX ADMIN — VANCOMYCIN HYDROCHLORIDE 125 MG: 125 CAPSULE ORAL at 17:43

## 2022-04-12 RX ADMIN — FUROSEMIDE 20 MG: 10 INJECTION, SOLUTION INTRAMUSCULAR; INTRAVENOUS at 17:43

## 2022-04-12 RX ADMIN — PREDNISONE 40 MG: 20 TABLET ORAL at 08:20

## 2022-04-12 RX ADMIN — VANCOMYCIN HYDROCHLORIDE 125 MG: 125 CAPSULE ORAL at 13:49

## 2022-04-12 RX ADMIN — BENZONATATE 100 MG: 100 CAPSULE ORAL at 20:53

## 2022-04-12 RX ADMIN — Medication 5 MG: at 20:59

## 2022-04-12 RX ADMIN — GABAPENTIN 200 MG: 100 CAPSULE ORAL at 21:03

## 2022-04-12 RX ADMIN — VANCOMYCIN HYDROCHLORIDE 125 MG: 125 CAPSULE ORAL at 21:00

## 2022-04-12 RX ADMIN — MAGNESIUM SULFATE HEPTAHYDRATE 2 G: 40 INJECTION, SOLUTION INTRAVENOUS at 11:52

## 2022-04-12 RX ADMIN — BENZONATATE 100 MG: 100 CAPSULE ORAL at 06:56

## 2022-04-12 RX ADMIN — GUAIFENESIN 1200 MG: 600 TABLET ORAL at 20:55

## 2022-04-12 RX ADMIN — ALBUTEROL SULFATE 2.5 MG: 2.5 SOLUTION RESPIRATORY (INHALATION) at 08:08

## 2022-04-12 RX ADMIN — UMECLIDINIUM 1 PUFF: 62.5 AEROSOL, POWDER ORAL at 21:08

## 2022-04-12 RX ADMIN — FUROSEMIDE 20 MG: 10 INJECTION, SOLUTION INTRAMUSCULAR; INTRAVENOUS at 08:22

## 2022-04-12 RX ADMIN — GUAIFENESIN 1200 MG: 600 TABLET ORAL at 08:20

## 2022-04-12 RX ADMIN — PIPERACILLIN AND TAZOBACTAM 3.38 G: 3; .375 INJECTION, POWDER, LYOPHILIZED, FOR SOLUTION INTRAVENOUS at 05:52

## 2022-04-12 RX ADMIN — RIVAROXABAN 15 MG: 15 TABLET, FILM COATED ORAL at 21:03

## 2022-04-12 RX ADMIN — CALCIUM 500 MG: 500 TABLET ORAL at 08:19

## 2022-04-12 RX ADMIN — FLUTICASONE FUROATE AND VILANTEROL TRIFENATATE 1 PUFF: 200; 25 POWDER RESPIRATORY (INHALATION) at 21:09

## 2022-04-12 RX ADMIN — IPRATROPIUM BROMIDE AND ALBUTEROL SULFATE 3 ML: 2.5; .5 SOLUTION RESPIRATORY (INHALATION) at 19:47

## 2022-04-12 RX ADMIN — AMOXICILLIN AND CLAVULANATE POTASSIUM 1 TABLET: 875; 125 TABLET, FILM COATED ORAL at 21:02

## 2022-04-12 RX ADMIN — POTASSIUM CHLORIDE 20 MEQ: 1.5 POWDER, FOR SOLUTION ORAL at 17:40

## 2022-04-12 RX ADMIN — VANCOMYCIN HYDROCHLORIDE 125 MG: 125 CAPSULE ORAL at 08:20

## 2022-04-12 RX ADMIN — GABAPENTIN 200 MG: 100 CAPSULE ORAL at 08:20

## 2022-04-12 RX ADMIN — TRAMADOL HYDROCHLORIDE 50 MG: 50 TABLET, COATED ORAL at 21:00

## 2022-04-12 RX ADMIN — B-COMPLEX W/ C & FOLIC ACID TAB 1 MG 1 TABLET: 1 TAB at 08:21

## 2022-04-12 RX ADMIN — POTASSIUM CHLORIDE 20 MEQ: 20 TABLET, EXTENDED RELEASE ORAL at 10:57

## 2022-04-12 RX ADMIN — TAMOXIFEN CITRATE 20 MG: 10 TABLET ORAL at 21:06

## 2022-04-12 RX ADMIN — TRAMADOL HYDROCHLORIDE 50 MG: 50 TABLET, COATED ORAL at 08:21

## 2022-04-12 RX ADMIN — METOPROLOL SUCCINATE 50 MG: 25 TABLET, FILM COATED, EXTENDED RELEASE ORAL at 20:57

## 2022-04-12 RX ADMIN — IPRATROPIUM BROMIDE AND ALBUTEROL SULFATE 3 ML: 2.5; .5 SOLUTION RESPIRATORY (INHALATION) at 15:57

## 2022-04-12 RX ADMIN — SODIUM CHLORIDE SOLN NEBU 3% 3 ML: 3 NEBU SOLN at 19:47

## 2022-04-12 RX ADMIN — IPRATROPIUM BROMIDE AND ALBUTEROL SULFATE 3 ML: 2.5; .5 SOLUTION RESPIRATORY (INHALATION) at 11:37

## 2022-04-12 RX ADMIN — DOXYCYCLINE 100 MG: 100 INJECTION, POWDER, LYOPHILIZED, FOR SOLUTION INTRAVENOUS at 06:55

## 2022-04-12 ASSESSMENT — ACTIVITIES OF DAILY LIVING (ADL)
ADLS_ACUITY_SCORE: 13
ADLS_ACUITY_SCORE: 13
ADLS_ACUITY_SCORE: 17
ADLS_ACUITY_SCORE: 13
ADLS_ACUITY_SCORE: 9
ADLS_ACUITY_SCORE: 13
ADLS_ACUITY_SCORE: 9
ADLS_ACUITY_SCORE: 13
ADLS_ACUITY_SCORE: 9
ADLS_ACUITY_SCORE: 13
ADLS_ACUITY_SCORE: 17
ADLS_ACUITY_SCORE: 17
ADLS_ACUITY_SCORE: 13

## 2022-04-12 NOTE — DISCHARGE INSTRUCTIONS
Home care services have been arrange for you    Home Care Agency: [facility]     Home Care Phone: [phone]    Services: PT/OT    Instructions: Home care will visit within 48 hrs after discharge.  Provider will call you to schedule visit.    Pankaj Montanez MD    General - Internal Medicine    [phone]   It is recommended to follow up with your primary care provider in 7 days     Andrey Noguera MD    Consulting Physician    Since 4/15/2022    [phone]   Infectious disease     [facility]  Therapy/Anxiety Management  [phone]

## 2022-04-12 NOTE — CONSULTS
[facility]  Palliative Care Consultation Note    Patient: Irene León  Date of Admission:  4/7/2022    Requesting Clinician / Team: Dr. Lombardo  Reason for consult: Symptom management  Goals of care  Patient and family support    Code status: No CPR- Do NOT Intubate    Impression & Recommendations:  SYMPTOM ASSESSMENT  1.  Shortness of breath secondary to advanced COPD  -Appreciate pulmonary and HMS service recommendations and management  -Oxygen as at home-3 L nasal cannula  -Continue with prednisone taper, nebulizers, inhalers, doxycycline, Augmentin, Mucinex as ordered    2.  Generalized weakness and fatigue secondary to advanced COPD and activity intolerance.  -At baseline, patient limited in mobility in her home.  Difficult for her to get out and about even to doctor's appointments.    3.  Chronic pain syndrome with chronic back pain from T12 compression fracture  3/9/2021  and QTc 516 on EKG.  - Patient sees orthopedics for injections. Epidural injection 1/20/2022.  - Gabapentin 200  mg by mouth twice daily (normally on 600 mg po BID).  - ContinueTramadol 50 mg by mouth twice daily as needed for pain.   - Continue Salonpas and Voltaren.     4. Anorexia and weight loss (30 pound weight loss since 3/2021) - weight stable  - Continue to monitor.   - Patient watching diet and intake. Eating twice a day.    ADVANCED CARE PLANNING/GOALS OF CARE DISCUSSION  -CODE STATUS: Now DNR/DNI.  Patient acknowledges that resuscitation and intubation would be burdensome.    - Hopeful for recovery.  Goals are otherwise life-prolonging  -.  Discussions ongoing.    These recommendations have been discussed with Dr. Calles and Dr. Lombardo.      Thank you for the opportunity to participate in the care of this patient and family. Our team: will continue to follow.     During regular M-F work hours -- if you are not sure who specifically to contact -- please contact us by calling us directly at the  Palliative Care Main Line [phone]    After regular work hours and on weekends/holidays, you can leave a message at [phone]      History of Present Illness:  History gathered today from: patient, medical chart, medical team members  Irene León is a 77 year old female with a past medical history of severe COPD, chronic hypoxic respiratory failure, uses home oxygen, chronic systolic CHF (Echocardiogram 10/2020 EF 40% and Echo 7/28/2021 55-60%), chronic pain syndrome with T12 compression fracture 10/2020, 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  Patient was admitted to [facility] on 4/7/2022 with complaints of chest tightness and shortness of breath.  Currently being treated for COPD exacerbation and possible pneumonia based on chest x-ray and VQ scan and elevated white count.  Patient then found to be positive for C. Difficile.    Today, the patient was seen for:  Shortness of breath and goals of care discussion    Prognosis, Goals, & Planning:      Functional Status just prior to hospitalization: 2 (Ambulatory and capable of all selfcare but unable to carry out any work activities; may need help with IADLs up and about > 50% of waking hours)      Prognosis, Goals, and/or Advance Care Planning were addressed today: Yes        Summary/Comments: Met with patient in room today.  Patient known to palliative care as is seen in the outpatient palliative care clinic.  Patient has a very strong connection to her family her , children and grandchildren are of utmost priority to her.  Has been very strong and wishing to be a full code as well as wishing for all life prolonging interventions due to this connection to family.  During her visit today patient expresses fear of how quickly she became ill and how ill she has felt.  Expresses fear and concerned about potentially getting sicker or being  "dependent on machines.  Discussed benefit versus burden of resuscitation and intubation with patient.  With this conversation, she expresses that she does not think she would handle it well nor would she want to \"put her family through that.\"  CODE STATUS has been changed to DO NOT RESUSCITATE DO NOT INTUBATE to reflect these wishes.  Discussed with patient that we will reach out and discuss goals of care with her daughter Leelee updating her and answering her questions regarding her condition and CODE STATUS.      Patient's decision making preferences: with input from medical clinicians and loved ones          Patient has decision-making capacity today for complex decisions: Yes            I have concerns about the patient/family's health literacy today: No           Patient has a completed Health Care Directive: No.       Code status: No CPR / No Intubation    Coping, Meaning, & Spirituality:   Mood, coping, and/or meaning in the context of serious illness were addressed today: Yes  Summary/Comments: Patient is of the Denominational merline.  Declines spiritual care visit at this time including anointing of the sick as she feels this is a \"near the end\" type of sacrament.    Key Palliative Symptom Data:  # Pain severity the last 12 hours: low  # Dyspnea severity the last 12 hours: moderate  # Nausea severity the last 12 hours: none  # Anxiety severity the last 12 hours: moderate  Fatigue: Moderate-severe    ROS:  Comprehensive ROS is reviewed and is negative except as here & per HPI.     Past Medical History:  Past Medical History:   Diagnosis Date     ANATOLIY (acute kidney injury) (H)      Allergic rhinitis      Arrhythmia      Atrial fibrillation with RVR (H)      Bacteremia      Breast cancer (H) 2017     Cardiomyopathy (H)      Centrilobular emphysema (H)      CHF (congestive heart failure) (H)      CKD (chronic kidney disease)      COPD (chronic obstructive pulmonary disease) (H)      Coronary artery disease      " Depression      Dysphagia      E. coli sepsis (H)      Factor 5 Leiden mutation, heterozygous (H)      GERD (gastroesophageal reflux disease)      Hx of radiation therapy 2017     Hyperlipidemia      Hypertension      Hypokalemia      Hypomagnesemia      Idiopathic cardiomyopathy (H)      Left hip pain 4/30/2014     OAB (overactive bladder)      Osteoporosis      Sacral insufficiency fracture      Sinusitis      Syncope      Urge incontinence      Vocal cord dysfunction         Past Surgical History:  Past Surgical History:   Procedure Laterality Date     ARTHROPLASTY REVISION HIP Left      BIOPSY BREAST Right 2017     BLADDER SURGERY      bladder interstim with removal     CARDIAC CATHETERIZATION       CATARACT EXTRACTION Left 07/18/2017     IR ABDOMINAL AORTOGRAM  4/16/2003     IR AORTIC ARCH 4 VESSEL ANGIOGRAM  4/16/2003     IR MISCELLANEOUS PROCEDURE  4/16/2003     LUMPECTOMY BREAST Left 06/2017    x2     PICC DOUBLE LUMEN PLACEMENT  4/11/2022          PICC TRIPLE LUMEN PLACEMENT  4/7/2022          ZZC OPEN TX FEMORAL FRACTURE DISTAL MED/LAT CONDYLE Left 10/28/2015    Procedure: OPEN REDUCTION INTERNAL FIXATION LEFT  PROXIMAL FEMUR PERIPROSTHETIC FRACTURE;  Surgeon: Yovanny Albarran MD;  Location[facility];  Service: Orthopedics         Family History:  Family History   Problem Relation Age of Onset     Osteoporosis Other      Prostate Cancer Brother      Breast Cancer Maternal Aunt         age thought to be in her 70's-80's     Prostate Cancer Maternal Uncle         Social:     Living situation: Lives at home with her  who has dementia.    Key family / caregivers:  and daughter, Leelee.  Patient's son lives in [place].  Patient has two 21-year-old twin grandchildren and 2-year-old twin grandchildren     Allergies:  Allergies   Allergen Reactions     Sulfa (Sulfonamide Antibiotics) [Sulfa Drugs] Rash     Headaches and dizziness.     Homeopathic Drugs [External Allergen Needs  Reconciliation - See Comment] Unknown and Other (See Comments)     Comment: runny nose, Comment: runny nose     Mold Extracts [Molds & Smuts] Unknown     Morphine (Pf) [Morphine] Unknown     hallucinate     Lisinopril Cough, Unknown and Itching     Comment: cough, Comment: cough     Sulfacetamide Sodium [Sulfacetamide] Rash        Medications:  I have reviewed this patient's medication profile and medications from this hospitalization.     Lab Results: personally reviewed.   Lab Results   Component Value Date     04/12/2022    CO2 21 04/12/2022    BUN 26 04/12/2022     Lab Results   Component Value Date    WBC 28.8 04/12/2022    HGB 8.8 04/12/2022    HCT 25.2 04/12/2022    MCV 83 04/12/2022     04/12/2022     AST   Date Value Ref Range Status   07/29/2021 23 0 - 40 U/L Final     ALT   Date Value Ref Range Status   07/29/2021 12 0 - 45 U/L Final     Alkaline Phosphatase   Date Value Ref Range Status   01/12/2022 51 45 - 120 U/L Final     Albumin   Date Value Ref Range Status   07/29/2021 3.9 3.5 - 5.0 g/dL Final       RADIOLOGY:  NM Lung Scan Perfusion Particulate    Result Date: 4/7/2022  EXAM: NM LUNG SCAN PERFUSION PARTICULATE LOCATION: [facility] DATE/TIME: 4/7/2022 5:18 PM INDICATION: PE suspected, high prob COMPARISON: Chest x-ray 04/07/2022, V/Q scan 06/24/2020, CT chest exam 09/13/2019 TECHNIQUE: 7.9 mCi technetium-99m MAA, IV. Standard lung perfusion imaging. FINDINGS: Heterogeneous perfusion throughout the left lung with numerous small subsegmental perfusion defects similar to the previous VQ scan. Perfusion to the right lung is more homogeneous, with a few subtle areas of decreased activity also similar to the prior exam. Chest x-ray from today demonstrates emphysema with diffuse opacities in the left upper and mid left lung suggestive of pneumonia.     IMPRESSION: 1.  Chronic perfusion abnormalities throughout both lungs similar to the previous exam. No new perfusion  abnormalities. Recent chest x-ray is suggestive of left mid and upper lung pneumonia.    Echocardiogram Complete    Result Date: 2022  [id number]^TEREZA^GERALD^[place] [address]  Name: JUDY ZARCO MRN: [id number] : 1945 Study Date: 2022 02:58 PM Age: 77 yrs Gender: Female Patient Location: [facility] Reason For Study: Syncope Ordering Physician: GERALD STARKS Performed By: SUGEY  BSA: 1.4 m2 Height: 59 in Weight: 112 lb ______________________________________________________________________________ Procedure Complete Portable Echo Adult. ______________________________________________________________________________ Interpretation Summary  1. The left ventricle is normal in size. Left ventricular systolic performance is moderately reduced. The ejection fraction is estimated to be 35-40%. 2. There is moderate global reduction in left ventricular systolic performance. 3. There is abnormal septal motion possibly related to altered electrical activation due to bundle branch block. 4. There is mild-moderate, to perhaps moderate, tricuspid insufficiency. 5. Normal right ventricular size with mildly reduced right ventricular systolic performance. 6. There is mild right atrial enlargement. 7. Right ventricular systolic pressure relative to right atrial pressure is mildly increased. The pulmonary artery pressure is estimated to be 45-50 mmHg plus right atrial pressure (the IVC is mildly dilated).  When compared to the prior real-time echocardiogram dated 2021, there has been a mild further diminution in left ventricular systolic performance (it is this reader's impression that left ventricular systolic performance was mild-moderately reduced with an ejection fraction 40-45% on the prior examination). ______________________________________________________________________________ Left ventricle: The left ventricle is normal in size.  Left ventricular systolic performance is moderately reduced. The ejection fraction is estimated to be 35-40%. There is moderate global reduction in left ventricular systolic performance. There is abnormal septal motion possibly related to altered electrical activation due to bundle branch block. Left ventricular wall thickness is normal. 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  Assessment of left atrial pressure (LAP): The cumulative findings are indeterminate in evaluating left atrial pressure.  Right ventricle: Normal right ventricular size with mildly reduced right ventricular systolic performance.  Left atrium: There is borderline left atrial enlargement.  Right atrium: There is mild right atrial enlargement.  IVC: The IVC is mildly dilated.  Aortic valve: The aortic valve is comprised of three cusps.  Mitral valve: There is mild mitral annular calcification. There is trace mitral insufficiency.  Tricuspid valve: The tricuspid valve is grossly morphologically normal. There is mild-moderate, to perhaps moderate, tricuspid insufficiency.  Pulmonic valve: The pulmonic valve is grossly morphologically normal.  Thoracic aorta: The aortic root and proximal ascending aorta are of normal dimension.  Pericardium: There is no significant pericardial effusion. ______________________________________________________________________________ ______________________________________________________________________________ MMode/2D Measurements & Calculations RVDd: 3.5 cm IVSd: 0.98 cm LVIDd: 4.1 cm LVIDs: 3.3 cm LVPWd: 0.90 cm FS: 19.4 % LV mass(C)d: 122.9 grams LV mass(C)dI: 85.3 grams/m2 Ao root diam: 2.8 cm LA dimension: 3.5 cm asc Aorta Diam: 2.8 cm LA/Ao: 1.2 LVOT diam: 1.8 cm LVOT area: 2.6 cm2  LA Volume Indexed (AL/bp): 28.8 ml/m2 RWT: 0.44  Time Measurements MM HR: 98.0 BPM  Doppler Measurements & Calculations MV E max carolyn: 89.4 cm/sec MV A max carolyn: 135.9 cm/sec MV E/A: 0.66 MV dec time:  0.12 sec LV V1 max PG: 3.2 mmHg LV V1 max: 89.2 cm/sec LV V1 VTI: 18.3 cm SV(LVOT): 48.2 ml SI(LVOT): 33.4 ml/m2 TR max carolyn: 348.4 cm/sec TR max P.6 mmHg E/E' av.9 Lateral E/e': 11.0 Medial E/e': 16.8  ______________________________________________________________________________ Report approved by: Deepak Anderson 2022 04:24 PM       US Lower Extremity Venous Duplex Bilateral    Result Date: 2022  EXAM: US LOWER EXTREMITY VENOUS DUPLEX BILATERAL LOCATION: [facility] DATE/TIME: 2022 3:49 PM INDICATION: hypoxia, hypotension; leg swelling COMPARISON: None. TECHNIQUE: Venous Duplex ultrasound of bilateral lower extremities with and without compression, augmentation and duplex. Color flow and spectral Doppler with waveform analysis performed. FINDINGS: Exam includes the common femoral, femoral, popliteal veins as well as segmentally visualized deep calf veins and greater saphenous vein. RIGHT: No deep vein thrombosis. No superficial thrombophlebitis. No popliteal cyst. LEFT: No deep vein thrombosis. No superficial thrombophlebitis. No popliteal cyst.     IMPRESSION: 1.  No deep venous thrombosis in the bilateral lower extremities.    XR Chest Port 1 View    Result Date: 4/10/2022  EXAM: XR CHEST PORTABLE 1 VIEW LOCATION: [facility] DATE/TIME: 04/10/2022, 7:56 AM INDICATION: Shortness of breath. COMPARISON: CT of the chest, abdomen, and pelvis performed 2022. Chest radiograph performed 2022.     IMPRESSION: Ill-defined consolidation and infiltrate in the left upper lobe is again noted. Mild hyperinflation both lungs. The right lung is otherwise clear. Tortuous calcified thoracic aorta. Heart size has increased in prominence compared to 2022. Pulmonary vascularity is within normal limits where seen. Old right rib fractures. Right PICC line has been removed.     XR Chest Port 1 View    Result Date: 2022  EXAM: XR CHEST  PORT 1 VIEW LOCATION: [facility] DATE/TIME: 4/7/2022 4:49 PM INDICATION: Cough. COMPARISON: 06/02/2021.     IMPRESSION: New moderate opacity in the left mid to upper lung suggestive of pneumonia given history of cough. However, this requires follow-up to complete resolution to exclude an underlying lesion (6-8 week follow-up radiographs). Right PICC tip near the cavoatrial junction. Normal heart size. Atherosclerosis. No significant pleural effusion.     CT Chest Abdomen Pelvis w/o Contrast    Result Date: 4/8/2022  EXAM: CT CHEST ABDOMEN PELVIS W/O CONTRAST LOCATION: [facility] DATE/TIME: 4/8/2022 2:17 PM INDICATION: History of breast cancer. Pneumonia. Weight loss. COMPARISON: CT chest 6/12/2020, CT chest/abdomen/pelvis 6/11/2019 TECHNIQUE: CT scan of the chest, abdomen, and pelvis was performed without IV contrast. Multiplanar reformats were obtained. Dose reduction techniques were used. CONTRAST: None. FINDINGS: LUNGS AND PLEURA: Moderate irregular consolidation in the posterior left upper lobe. Confluent emphysema. Mild bibasilar atelectasis/scar. Trace pleural effusions. Stable mild diffuse bronchial wall thickening. A few stable benign pulmonary nodules. MEDIASTINUM/AXILLAE: Right PICC. Mediastinal lymphadenopathy with the largest largest being a right paratracheal node measuring 1.6 cm in short axis, image 48:3. Small pericardial effusion. The main pulmonary artery is enlarged at 3.0 cm which may be seen with pulmonary artery hypertension. CORONARY ARTERY CALCIFICATION: Moderate. HEPATOBILIARY: Normal. PANCREAS: Normal. SPLEEN: Normal. ADRENAL GLANDS: Normal. KIDNEYS/BLADDER: Low-lying right kidney. Subcentimeter renal hypodensities which are too small to characterize. No hydronephrosis. BOWEL: No obstruction or inflammatory change. Normal appendix. LYMPH NODES: Normal. VASCULATURE: Stable ectasia of the abdominal aorta measuring 2.9 x 2.3 cm, image 169:3.  Moderate atherosclerosis. PELVIC ORGANS: Normal. MUSCULOSKELETAL: Left hip arthroplasty. Osseous demineralization. Degenerative changes of the spine. Remote left rib fractures. T12 compression fracture with 80% loss of height which is likely remote.     IMPRESSION: 1.  Moderate left upper lobe consolidation. This is consistent with pneumonia. Recommend a follow-up chest radiograph in 4-6 weeks to document resolution. 2.  Mediastinal lymphadenopathy. 3.  No acute abnormality in the abdomen or pelvis.        Physical Exam:  Temp:  [97.5  F (36.4  C)-98  F (36.7  C)] 98  F (36.7  C)  Pulse:  [79-89] 82  Resp:  [16-30] 24  BP: (104-151)/(56-72) 142/63  SpO2:  [91 %-97 %] 93 %  Wt Readings from Last 3 Encounters:   04/11/22 54.6 kg (120 lb 6.4 oz)   03/21/22 49.9 kg (110 lb)   01/12/22 49.9 kg (110 lb)      General appearance: alert, appears stated age, cooperative and fatigued  Head: Normocephalic, without obvious abnormality, atraumatic, temporal wasting noted  Eyes: sclera anicteric. Lids and lashes normal.  Nose: no discharge, NC in place  Neck: no JVD  Lungs: clear to auscultation bilaterally  Heart: regular rate   Abdomen: Soft, nontender, nondistended, positive bowel sounds x4  Extremities: No edema noted.  Moves all extremities in bed.  Skin: Skin color, texture, turgor normal. No rashes or lesions  Neurologic: Alert.  Oriented x4.  Calm.    TTS: I have personally spent a total of 60 minutes today reviewing patient's medical record, consultation with the medical providers, and > 50% of this time spent assessing patient and symptoms and discussing goals of care and providing emotional support.    DELMIS Dobson, CNS  Palliative Care  [phone]

## 2022-04-12 NOTE — PROGRESS NOTES
PULMONARY MEDICINE PROGRESS NOTE  4/12/2022    Admit Date: 4/7/2022  CODE: Full Code    Reason for Consult: acute exacerbation of COPD     Assessment/Plan:   77 year old female with a history of severe asthma-COPD overlap syndrome with hyper-IgE followed at [facility], now in with severe left-sided CAP and AECOPD.     CAP, AECOPD: Baseline severe COPD, followed at [facility]. On high-dose triple-therapy and oxygen at baseline. Goal SpO2 88-92%. Now with severe left-sided CAP. Other options outpatient include macrolide MWF, roflumilast, also has hyper-IgE, was discussing omalizumab or other biologic with her pulmonologist. Needs a formal health care directive/GOC discussion. Would benefit from pulmonary rehab. Saw palliative care today, after which I talked to her and she indicated that she would not want invasive mechanical ventilation or chest compressions, so will change her code status to DNR/DNI. Otherwise would consider prednisone taper, and complete antibiotics for severe pneumonia.     Plan:  - oxygen goal SpO2 88-92%  - consider prednisone taper  - complete 7-day course of antibiotics  - scheduled bronchodilators while inpatient  - continue high-dose fluticasone furoate-umeclidinium-vilanterol (Trelelgy Ellipta) on discharge  - referral to pulmonary rehab at [facility]  - after my discussion with her today, she clearly indicates DNR/DNI status (she stated this after a discussion with palliative care helped to clarify things for her)  - follow up with outpatient pulmonologist, Dr. Dietrich at [facility] to discuss omalizumab or other biologic, possible macrolide or roflumilast, pulmonary rehab  - will sign off but call any time if needed    Neto Calles MD  Pulmonary and Critical Care Medicine  [facility]  Office [phone]  Pager [phone]  (he/him/his)                                                                                                                                          "               SUBJECTIVE/INTERVAL HISTORY   Breathing feels close to baseline, minimal cough. Talking to family medicine team and palliative care when I rounded on her.                                                                                                                                                      Exam/Data:     Vitals  BP (!) 142/63 (BP Location: Left arm)   Pulse 82   Temp 98  F (36.7  C) (Axillary)   Resp 24   Ht 1.499 m (4' 11\")   Wt 54.6 kg (120 lb 6.4 oz)   SpO2 93%   BMI 24.32 kg/m       I/O last 3 completed shifts:  In: 480 [P.O.:480]  Out: 1200 [Urine:1100; Other:100]  Weight change:   [unfilled]  EXAM:  BP (!) 142/63 (BP Location: Left arm)   Pulse 82   Temp 98  F (36.7  C) (Axillary)   Resp 24   Ht 1.499 m (4' 11\")   Wt 54.6 kg (120 lb 6.4 oz)   SpO2 93%   BMI 24.32 kg/m      Intake/Output last 3 shifts:  I/O last 3 completed shifts:  In: 480 [P.O.:480]  Out: 1200 [Urine:1100; Other:100]  Intake/Output this shift:  No intake/output data recorded.    Physical Exam  Gen: alert, oriented  HEENT: NT, no CHRISTINA  CV: RRR, no m/g/r  Resp: accessory muscle use at rest, diminished bilaterally with prolonged expiratory phase, crackles on left  Abd: soft, nontender, BS+  Skin: no rashes or lesions  Ext: no edema  Neuro: PERRL, nonfocal exam    ROS: A complete 10-system review of systems was obtained and is negative with the exception of what is noted in the history of present illness.    Medications:       - MEDICATION INSTRUCTIONS -         amoxicillin-clavulanate  1 tablet Oral Q12H VY     calcium carbonate 500 mg (elemental)  500 mg Oral Daily     doxycycline hyclate  100 mg Oral Q12H VY     famotidine  20 mg Oral Q48H     fluticasone-vilanterol  1 puff Inhalation At Bedtime    And     umeclidinium  1 puff Inhalation At Bedtime     furosemide  20 mg Intravenous BID     [Held by provider] furosemide  40 mg Oral Daily     gabapentin  200 mg Oral BID     guaiFENesin  1,200 mg Oral " BID     ipratropium - albuterol 0.5 mg/2.5 mg/3 mL  3 mL Nebulization 4x daily     melatonin  5 mg Oral At Bedtime     metoprolol succinate ER  50 mg Oral At Bedtime     multivitamin RENAL  1 tablet Oral Daily     rivaroxaban ANTICOAGULANT  15 mg Oral At Bedtime     simvastatin  40 mg Oral At Bedtime     sodium chloride  3 mL Nebulization BID     tamoxifen  20 mg Oral QPM     traMADol  50 mg Oral BID     vancomycin  125 mg Oral 4x Daily         DATA  All laboratory and radiology has been personally reviewed by myself today.  Recent Labs   Lab 04/12/22  0550   WBC 28.8*   HGB 8.8*   HCT 25.2*        Recent Labs   Lab 04/12/22  0550 04/11/22  0417 04/10/22  1452    144 144   CO2 21* 19* 17*   BUN 26 29* 34*     PFT DATA (October 2019):  FEV1 0.85 with significant BD response, DLCO 36%     IMAGING:   CT C/A/P:  FINDINGS:   LUNGS AND PLEURA: Moderate irregular consolidation in the posterior left upper lobe. Confluent emphysema. Mild bibasilar atelectasis/scar. Trace pleural effusions. Stable mild diffuse bronchial wall thickening. A few stable benign pulmonary nodules.     MEDIASTINUM/AXILLAE: Right PICC. Mediastinal lymphadenopathy with the largest largest being a right paratracheal node measuring 1.6 cm in short axis, image 48:3. Small pericardial effusion. The main pulmonary artery is enlarged at 3.0 cm which may be   seen with pulmonary artery hypertension.     CORONARY ARTERY CALCIFICATION: Moderate.     HEPATOBILIARY: Normal.     PANCREAS: Normal.     SPLEEN: Normal.     ADRENAL GLANDS: Normal.     KIDNEYS/BLADDER: Low-lying right kidney. Subcentimeter renal hypodensities which are too small to characterize. No hydronephrosis.     BOWEL: No obstruction or inflammatory change. Normal appendix.     LYMPH NODES: Normal.     VASCULATURE: Stable ectasia of the abdominal aorta measuring 2.9 x 2.3 cm, image 169:3. Moderate atherosclerosis.     PELVIC ORGANS: Normal.     MUSCULOSKELETAL: Left hip  arthroplasty. Osseous demineralization. Degenerative changes of the spine. Remote left rib fractures. T12 compression fracture with 80% loss of height which is likely remote.                                                                      IMPRESSION:  1.  Moderate left upper lobe consolidation. This is consistent with pneumonia. Recommend a follow-up chest radiograph in 4-6 weeks to document resolution.  2.  Mediastinal lymphadenopathy.  3.  No acute abnormality in the abdomen or pelvis.

## 2022-04-12 NOTE — PLAN OF CARE
Problem: Plan of Care - These are the overarching goals to be used throughout the patient stay.    Goal: Optimal Comfort and Wellbeing  Outcome: Ongoing, Progressing     Problem: Risk for Delirium  Goal: Improved Sleep  Outcome: Ongoing, Progressing     Problem: COPD (Chronic Obstructive Pulmonary Disease) Comorbidity  Goal: Maintenance of COPD Symptom Control  Intervention: Maintain COPD-Symptom Control  Recent Flowsheet Documentation  Taken 4/12/2022 0100 by Aretha Massey RN  Medication Review/Management: medications reviewed   Goal Outcome Evaluation:           Saturations maintained above 90% on 3 LPM overnight. PICC line patent with good blood return. No BM this shift. enteric precautions maintained. Pt denies pain. She remains calm and collected, but reports feeling anxious and attributes it to concerns for the end of her life. Healing presence and active listening utilized. Spiritual services consulted. Continue with plan of care.

## 2022-04-12 NOTE — PLAN OF CARE
Goal Outcome Evaluation:      Problem: Plan of Care - These are the overarching goals to be used throughout the patient stay.    Goal: Optimal Comfort and Wellbeing  Outcome: Ongoing, Progressing     Problem: COPD (Chronic Obstructive Pulmonary Disease) Comorbidity  Goal: Maintenance of COPD Symptom Control  Outcome: Ongoing, Progressing  Intervention: Maintain COPD-Symptom Control  Recent Flowsheet Documentation  Taken 4/12/2022 0830 by Nickie Rashid  Medication Review/Management: medications reviewed    Problem: Plan of Care - These are the overarching goals to be used throughout the patient stay.    Goal: Absence of Hospital-Acquired Illness or Injury  Intervention: Identify and Manage Fall Risk  Recent Flowsheet Documentation  Taken 4/12/2022 0830 by Nickie Rashid  Safety Promotion/Fall Prevention:    activity supervised    bed alarm on    fall prevention program maintained    nonskid shoes/slippers when out of bed    patient and family education    supervised activity  Intervention: Prevent Skin Injury  Recent Flowsheet Documentation  Taken 4/12/2022 0830 by Nickie Rashid  Body Position: position changed independently     Pt can change position independently, however reports SOB when she does. Tolerating 3L of O2 via nasal cannula well. Pt understands to call staff if she needs to use bedside commode, call light within reach.

## 2022-04-12 NOTE — PROGRESS NOTES
RESPIRATORY CARE NOTE     Patient Name: Irene León  Today's Date: 4/12/2022       Pt continues to receive duoneb. BS are diminished crackles. Pt is on 3 lpm of oxygen via NC, SpO2 is 94%. Post treatment there is increased aeration. Pt also perceives improvement.  RT encouraged deep breathing and coughing techniques .  RT will continue to monitor and assess.     Rosanne Jaimes, RT

## 2022-04-12 NOTE — CONSULTS
"ACUPUNCTURIST TREATMENT NOTE    Name: Irene León  :  1945  MRN:  [id number]    Acupuncture Treatment  Patient Type: Medical  Intervention Reason: Desires Experience  Patient complaint:: shortness of breath due to COPD  Initial insertions: Jana 1, Ken 17, Jana 7, Ki 6         \"Risks and benefits of acupuncture were discussed with patient. Consent for treatment was given. We thank you for the referral.\"     Kendy Pritchard L.Ac.     Date:  2022  Time:  2:55 PM    "

## 2022-04-12 NOTE — PROGRESS NOTES
CLINICAL NUTRITION SERVICES - REASSESSMENT NOTE     Nutrition Prescription    RECOMMENDATIONS FOR MDs/PROVIDERS TO ORDER:  None at this time    Malnutrition Status:    Pt does not meet 2 criteria     Recommendations already ordered by Registered Dietitian (RD):  No changes     Future/Additional Recommendations:  Encourage po      EVALUATION OF THE PROGRESS TOWARD GOALS   Diet: Regular  Nutrition Supplement/Support  Ensure Enlive bid   Intake: %          ANTHROPOMETRICS  Admission wt: 50kg  Most Recent Weight: 55kg        PHYSICAL FINDINGS  Per flowsheet   Edema +1 bilateral LE edema   GI- diarrhea noted, cdiff  Skin-no skin breakdown noted     LABS  Reviewed, K-3.4, Cr-1.23    MEDICATIONS  Reviewed, oscal, lasix, magnesium oxide, nephrovite      NUTRITION DIAGNOSIS  Inadequate oral intakes related to poor appetite as evidenced by severe weight loss over the past 6 months, <50% meal intakes x2 weeks.  --evaluation: ongoing    Goals:  Patient to consume % of nutritionally adequate meals three times per day, or the equivalent with supplements/snacks.-met  Improvement in renal labs -progressing, improving  Lytes WNL-not yet met     INTERVENTIONS  Implementation  No changes at this time seems to be ok on Ensure and regular diet     Monitoring/Evaluation  Progress toward goals will be monitored and evaluated per protocol.

## 2022-04-12 NOTE — PLAN OF CARE
Goal Outcome Evaluation:    Pt has been mostly oriented this shift. She has been using oxygen, nasal cannula, 3-4 liters,to keep sats above 90%. TELE: NSR x2. IV antibiotics given. Enteric precautions, c-diff +. IV lasix given, pure wick in place.

## 2022-04-12 NOTE — PROGRESS NOTES
[facility]    Progress Note - Hospitalist Service       Date of Admission:  4/7/2022    Assessment & Plan    77-year-old female with PMH significant for HFrEF, HTN, factor V Leiden on Xarelto, CKD, osteoporosis, and 35+ pound unintentional weight loss in the past 1 year who was admitted on 4/7/2022 with shortness of breath and was found to have radiographic evidence consistent with pneumonia.  Noncontrast CT chest/abdomen/pelvis without obvious masses to suggest malignancy.  EKG showed A. fib with RVR.  Due to ANATOLIY on CKD, furosemide was held initially but restarted HD3.  Echocardiogram showed EF 35-40%, down from 55 to 60% 7/26 2021.  Overnight 4/8-4/9 she developed new onset agitation and confusion.  Some medications known to be associated with delirium were discontinued.  She pulled out her PICC line and received IM Zyprexa after which she slept well.  Barriers to discharge include ongoing delirium, poor oral intake in the setting of unintentional weight loss, receiving IV fluids and antibiotics, labile vital signs. Does not seem to be following usual course. Pulmonology consulted 4/11. Palliative consulted 4/12.     Acute Hypoxic Respiratory Failure  Community-acquired left mid and upper lobe pneumonia  COPD Exacerbation  Hyperchloremic metabolic acidosis  Elevated lactate, improved  Leukocytosis, mildly improving  Persistent leukocytosis but in the setting of steroids for COPD exacerbation. CRP up trending today. Respiratory status does not seem to be improving as expected. Repeat CXR 4/10 showed continued infiltrate, enlarged cardiac silhouette. Does use some oxygen intermittently at home. OP Pulmonology note 1/22 ordered O2 for sats 86% on RA. Uses home oxygen intermittently. She was recommended to use 2 L of oxygen at home at all times.   - Pulmonology consulted, appreciate recs  -Trend CRP, WBC  - Change to Doxycycline PO x 2 more days  - Start Augmentin PO BID X 2 more days  -  Continue oral prednisone 40 mg, completed today  - Prednisone taper 30 mg, 20 mg, 10 mg, 5 mg for 3 days each  - Continuous pulse oximetry  - Supplemental O2 by NC, keep SPO2 88% to 92%. Avoid over oxygenation  - Scheduled Duonebs Q4hrs with Mucomyst  - PRN Albuterol Nebs Q2Hrs  - Continue Trelegy Ellipta 1 puff at bedtime   - Mucinex BID   -Tessalon PRN  - BMP, CBC, CRP    Goals of Care  Patient verbalizes that she would like everything done. She wants to have as much time with her grandchildren and family as possibly. We did discuss that she have very poor lung function and realistically she is not a candidate for some invasive potential interventions, and this would be high risk for her to have complications, ultimately taking time away from her family. We discussed asking palliative care to see her to assist with further goals of care and identifying what is most important to her moving forward. I also discussed this consult with Leelee, her daughter, who was in agreement that the information would be very helpful and if not accepted now, they want to establish for the future. Patient has expressed not wanting invasive/life prolonging in the past but verbalized conflicting thoughts this admission.   - Palliative care consult    HFrEF   PTA took furosemide 80 mg total daily.  Received IV Lasix 20 mg 4/10.  Echo showed reduced EF from prior.  Hyperinflated lungs without radiographic evidence to suggest heart failure exacerbation on CXR 4/10.  Could consider stress test prior to discharge pending clinical improvement. Sounds wet and volume up, soft blood pressures complicating diuresis. Has previously gone to 70's/40's.   - PTA Lasix 40 mg daily on hold   - Decrease Metoprolol to 50 mg XL daily in hopes of blood pressure allowing for diuresis  - Lasix 20 mg IV BID  - Daily weights  - Strict I/O, pure wick if patient tolerates  - Stop IVF    Delirium, Improving  Agitation  Gabapentin was decreased to 200 mg twice  daily.  Vesicare and Myrbetriq were discontinued.  Patient's room was changed to be closer to nursing station.  No indication for one-to-one at this time.  Reevaluate daily.  - Delirium precautions  - Bundle cares  - Continue melatonin 5 mg at bedtime  - Seroquel 12.5 mg as needed for agitation  - Open blinds, turn on lights during the day    C-diff Positive  Diarrhea  Patient began having loose stools overnight. She has had >3 loose/diarrhea stools in 24 hours. She has also been incontinent which is a change for her. She is not on stool softeners or laxatives. With elevated WBC, CRP, will check for infection. C-diff+ 4/11/22.   - Vanco  mg QID x 10 days (4/12-)    Weight loss  Multifactorial in the setting of COPD, decreased oral intake  -RD consulted, appreciate recommendations and assistance    Afib w/ RVR on Xarelto  Tachycardia  Prolonged QTC  Hx TIA, Factor V Leiden  Metoprolol was initially on hold for hypotension, now resumed. Did have episode of Afib w RVR to 160's day 1 after Metoprolol was held for hypotension. Telemetry showing sinus tachycardia with HR low 80s, BBB, PAC, PVCs.  - Continuous telemetry  - Avoid QT prolonging medications  - Add on Mg, keep above 2  -Trend and replete electrolytes as needed   - Decrease Metoprolol to 50 mg XR daily  - Continue Xarelto as at home     ANATOLIY on CKD, Improving  Admission Cr 2.44 BUN 36 with a variable baseline 1.5-2.0.  Creatinine has been downtrending with IV hydration.    - Daily BMP  - Avoid nephrotoxins    Overactive bladder  Discontinued PTA Mirabegron 50 mg and Solifenacin 5 mg in the setting of acute delirium.  -Pure wick if patient tolerates     Normocytic normochromic anemia, stable  Admission Hgb of 10.9.  Hgbs 13.3 - 9.8 in the 2 months PTA. No ssx GI bleed.  -CBC as above    Fragile skin  -Wrap IV, avoid adhesives  -WOC    Chronic Conditions and Medications  Breast Cancer  DCIS, ER/NY positive, HER-2 negative, s/p left breast lumpectomy and left  axillary sentinel lymph node biopsy.  - Cont. PTA Tamoxifen 20 daily  Osteoporosis  - Hold PTA VitD and Ca supplementation  Allergies  - Hold PTA Cetirizine and Flonase  GERD  - Cont. PTA 20 mg pepcid at bedtime  Chronic Pain secondary to T12 compression fracture  - Continue gabapentin 200 mg BID (dose decreased this admission)  - Continue Tramadol 50 mg Q12h as at home     Diet: Snacks/Supplements Adult: Ensure Enlive; With Meals  Regular Diet Adult    DVT Prophylaxis: Xarelto  Hayes Catheter: Not present  Fluids: PO  Central Lines: PRESENT  PICC Double Lumen 04/11/22 Right Brachial vein medial-Site Assessment: WDL  Cardiac Monitoring: ACTIVE order. Indication: QTc prolonging medication (48 hours)  Code Status: Full Code      Disposition Plan   Expected Discharge: 04/14/2022     Anticipated discharge location: home with family;home with help/services       The patient's care was discussed with the Attending Physician, Dr. Janae Perez.    I spoke with Leelee, the patient's daughter, on the phone.     Irene Lombardo MD   [facility]  [facility]      ______________________________________________________________________    Interval History    Overnight, she is frustrated with how urgently she has to use the bathroom. She is anxious about her health and the future. Wants to get home with her family and . Discussed with Leelee as well. She expressed wanting to speak with Palliative Care, and having a good discharge plan with increased resources.     Physical Exam   Vital Signs: Temp: 97.5  F (36.4  C) Temp src: Axillary BP: (!) 150/65 Pulse: 81   Resp: 30 SpO2: 91 % O2 Device: Nasal cannula Oxygen Delivery: 3 LPM  Weight: 120 lbs 6.4 oz  Physical Exam   Constitutional: Awake, alert, cooperative, oriented to person, place, and day.   Eyes: Lids and lashes normal, extra ocular muscles intact, sclera clear, conjunctiva normal.  ENT: Normocephalic, without obvious abnormality,  atraumatic  Lungs:  LS diminished throughout. +Rhonchi, no wheezing on exam.   Cardiovascular: Regular rate and rhythm, normal S1 and S2, +JOHN PAUL  Abdomen: Soft, non tender, no distention or guarding  Musculoskeletal: Able to ambulate  Neurologic: Awake and alert. Cranial nerves II-XII are grossly intact.  Neuropsychiatric: Normal affect, mood, orientation, memory seems poor  Skin: Very fragile, bruising      Data   Recent Labs   Lab 04/12/22  0550 04/11/22  1732 04/11/22  1316 04/11/22  0417 04/10/22  1453 04/10/22  1452   WBC 28.8*  --  36.3* 32.9*   < >  --    HGB 8.8*  --  11.7 10.4*   < >  --    MCV 83  --  83 86   < >  --      --  323 277   < >  --      --   --  144  --  144   POTASSIUM 3.4* 3.8  --  3.4*  --  3.5   CHLORIDE 111*  --   --  113*  --  112*   CO2 21*  --   --  19*  --  17*   BUN 26  --   --  29*  --  34*   CR 1.23*  --   --  1.29*  --  1.65*   ANIONGAP 13  --   --  12  --  15   MESSI 8.5  --   --  8.0*  --  8.2*   GLC 85  --   --  112  --  130*    < > = values in this interval not displayed.     No results found for this or any previous visit (from the past 24 hour(s)).  Medications     - MEDICATION INSTRUCTIONS -         amoxicillin-clavulanate  1 tablet Oral Q12H VY     calcium carbonate 500 mg (elemental)  500 mg Oral Daily     doxycycline hyclate  100 mg Oral Q12H VY     famotidine  20 mg Oral Q48H     fluticasone-vilanterol  1 puff Inhalation At Bedtime    And     umeclidinium  1 puff Inhalation At Bedtime     furosemide  20 mg Intravenous BID     [Held by provider] furosemide  40 mg Oral Daily     gabapentin  200 mg Oral BID     guaiFENesin  1,200 mg Oral BID     ipratropium - albuterol 0.5 mg/2.5 mg/3 mL  3 mL Nebulization 4x daily     magnesium sulfate  2 g Intravenous Once     melatonin  5 mg Oral At Bedtime     metoprolol succinate ER  50 mg Oral At Bedtime     multivitamin RENAL  1 tablet Oral Daily     rivaroxaban ANTICOAGULANT  15 mg Oral At Bedtime     simvastatin  40 mg  Oral At Bedtime     sodium chloride  3 mL Nebulization BID     tamoxifen  20 mg Oral QPM     traMADol  50 mg Oral BID     vancomycin  125 mg Oral 4x Daily

## 2022-04-12 NOTE — PLAN OF CARE
Pt was given scheduled nebulizer, BS coarse/diminished, currently on 3L NC. No complications, RT following.      Problem: COPD (Chronic Obstructive Pulmonary Disease) Comorbidity  Goal: Maintenance of COPD Symptom Control  Outcome: Ongoing, Progressing

## 2022-04-12 NOTE — PLAN OF CARE
Problem: Plan of Care - These are the overarching goals to be used throughout the patient stay.    Goal: Absence of Hospital-Acquired Illness or Injury  Intervention: Identify and Manage Fall Risk  Recent Flowsheet Documentation  Taken 4/12/2022 0808 by Airam Harris RN  Safety Promotion/Fall Prevention:   activity supervised   assistive device/personal items within reach   bed alarm on   clutter free environment maintained   fall prevention program maintained   mobility aid in reach   nonskid shoes/slippers when out of bed   patient and family education   room door open   room near nurse's station   supervised activity   treat underlying cause  Goal: Optimal Comfort and Wellbeing  Outcome: Ongoing, Progressing     Problem: Risk for Delirium  Goal: Improved Attention and Thought Clarity  Outcome: Ongoing, Progressing     Problem: COPD (Chronic Obstructive Pulmonary Disease) Comorbidity  Goal: Maintenance of COPD Symptom Control  Intervention: Maintain COPD-Symptom Control  Recent Flowsheet Documentation  Taken 4/12/2022 0808 by Airam Harris RN  Medication Review/Management: medications reviewed   Goal Outcome Evaluation:  Pt resting comfortably today, some air hunger anxiety noted this morning at the start of shift, but has since been comfortable and without incident.  She refused some therapies today.  MD discussed referral for palliative care with patient's daughter who is in agreement, awaiting consult at this time. Pt has been transitioned to oral antibiotics.

## 2022-04-12 NOTE — CONSULTS
COPD Initial Interview, Education, and Consult  4/12/2022, 10:18 AM    Reason for Consult: COPD Exacerbation  Patient Admitted for: COPD exacerbation (H) [J44.1]  Pneumonia due to infectious organism, unspecified laterality, unspecified part of lung [J18.9] on 4/7/2022     History of Present Illness:   78 yo Female with severe COPD, Oxygen dependent at 2 LPM baseline, HTN, CHF, CKD, Factor V. Presented with worsening shortness of breath, nonproductive cough, and diarrhea. Has been receiving treatment with supplemental oxygen, Breo Ellipta, Incruse Ellipta, Scheduled DuoNebs, and oral prednisone. IP Pulmonary and Palliative consults have been ordered.    Last PFT:  Date: 10/4/2019   Post-Spirometry:  FVC: 2.23, 88%  FEV1: 1.14, 58%  FEV1/FVC: 51%    Home Respiratory Medications:  Bronchodilators:   -albuterol neb/inhaler PRN  Combination Therapy:   -Trelegy once per day    Assessment: Patient is currently on 3 LPM via nasal cannula. Patient is comfortable at rest, alert, and speaking in full sentences.    Education done during visit:  Stephanie has been seen by our service and received education during previous admissions.  - Reviewed COPD Action Plan-Information on COPD and tools to help cope  -Reviewed respiratory medications.      Recommendations:  -Continue current inpatient therapy, per [facility] protocols  -Follow up appointment with pulmonology. Patient is followed by  at [facility].   -Medications patient is currently taking at home appear to be appropriate and most appropriate for a patient with reports of medication noncompliance.  -Pulmonary Rehab - referral has already been placed by Pulmonary    Stephanie will participate in our call back program. Will continue to follow patient throughout hospital stay along with educating patient and family.    Total time spend with patient 25 minutes and 50 minutes spent in chart review, care coordination, and documentation.    Sharon Ryan, RT, Chronic Pulmonary Disease  Specialist  Phone [phone]

## 2022-04-13 ENCOUNTER — APPOINTMENT (OUTPATIENT)
Dept: OCCUPATIONAL THERAPY | Facility: CLINIC | Age: 77
DRG: 177 | End: 2022-04-13
Payer: MEDICARE

## 2022-04-13 LAB
ANION GAP SERPL CALCULATED.3IONS-SCNC: 9 MMOL/L (ref 5–18)
BACTERIA BLD CULT: NO GROWTH
BASOPHILS # BLD AUTO: 0.1 10E3/UL (ref 0–0.2)
BASOPHILS NFR BLD AUTO: 0 %
BUN SERPL-MCNC: 26 MG/DL (ref 8–28)
C REACTIVE PROTEIN LHE: 13.9 MG/DL (ref 0–0.8)
CALCIUM SERPL-MCNC: 8.2 MG/DL (ref 8.5–10.5)
CHLORIDE BLD-SCNC: 108 MMOL/L (ref 98–107)
CO2 SERPL-SCNC: 26 MMOL/L (ref 22–31)
CREAT SERPL-MCNC: 1.1 MG/DL (ref 0.6–1.1)
EOSINOPHIL # BLD AUTO: 0 10E3/UL (ref 0–0.7)
EOSINOPHIL NFR BLD AUTO: 0 %
ERYTHROCYTE [DISTWIDTH] IN BLOOD BY AUTOMATED COUNT: 14.3 % (ref 10–15)
GFR SERPL CREATININE-BSD FRML MDRD: 51 ML/MIN/1.73M2
GLUCOSE BLD-MCNC: 83 MG/DL (ref 70–125)
HCT VFR BLD AUTO: 30 % (ref 35–47)
HGB BLD-MCNC: 10.3 G/DL (ref 11.7–15.7)
IMM GRANULOCYTES # BLD: 0.8 10E3/UL
IMM GRANULOCYTES NFR BLD: 3 %
LYMPHOCYTES # BLD AUTO: 1.8 10E3/UL (ref 0.8–5.3)
LYMPHOCYTES NFR BLD AUTO: 7 %
MAGNESIUM SERPL-MCNC: 1.9 MG/DL (ref 1.8–2.6)
MCH RBC QN AUTO: 28.5 PG (ref 26.5–33)
MCHC RBC AUTO-ENTMCNC: 34.3 G/DL (ref 31.5–36.5)
MCV RBC AUTO: 83 FL (ref 78–100)
MONOCYTES # BLD AUTO: 1.8 10E3/UL (ref 0–1.3)
MONOCYTES NFR BLD AUTO: 7 %
NEUTROPHILS # BLD AUTO: 21.8 10E3/UL (ref 1.6–8.3)
NEUTROPHILS NFR BLD AUTO: 83 %
NRBC # BLD AUTO: 0 10E3/UL
NRBC BLD AUTO-RTO: 0 /100
PHOSPHATE SERPL-MCNC: 3.6 MG/DL (ref 2.5–4.5)
PLATELET # BLD AUTO: 304 10E3/UL (ref 150–450)
POTASSIUM BLD-SCNC: 3.7 MMOL/L (ref 3.5–5)
RBC # BLD AUTO: 3.61 10E6/UL (ref 3.8–5.2)
SODIUM SERPL-SCNC: 143 MMOL/L (ref 136–145)
WBC # BLD AUTO: 26.3 10E3/UL (ref 4–11)

## 2022-04-13 PROCEDURE — 85004 AUTOMATED DIFF WBC COUNT: CPT | Performed by: STUDENT IN AN ORGANIZED HEALTH CARE EDUCATION/TRAINING PROGRAM

## 2022-04-13 PROCEDURE — 999N000157 HC STATISTIC RCP TIME EA 10 MIN

## 2022-04-13 PROCEDURE — 250N000011 HC RX IP 250 OP 636

## 2022-04-13 PROCEDURE — 250N000013 HC RX MED GY IP 250 OP 250 PS 637: Performed by: STUDENT IN AN ORGANIZED HEALTH CARE EDUCATION/TRAINING PROGRAM

## 2022-04-13 PROCEDURE — 86140 C-REACTIVE PROTEIN: CPT | Performed by: STUDENT IN AN ORGANIZED HEALTH CARE EDUCATION/TRAINING PROGRAM

## 2022-04-13 PROCEDURE — 83735 ASSAY OF MAGNESIUM: CPT

## 2022-04-13 PROCEDURE — 250N000013 HC RX MED GY IP 250 OP 250 PS 637

## 2022-04-13 PROCEDURE — 94640 AIRWAY INHALATION TREATMENT: CPT | Mod: 76

## 2022-04-13 PROCEDURE — 82310 ASSAY OF CALCIUM: CPT | Performed by: STUDENT IN AN ORGANIZED HEALTH CARE EDUCATION/TRAINING PROGRAM

## 2022-04-13 PROCEDURE — 250N000011 HC RX IP 250 OP 636: Performed by: STUDENT IN AN ORGANIZED HEALTH CARE EDUCATION/TRAINING PROGRAM

## 2022-04-13 PROCEDURE — 250N000009 HC RX 250: Performed by: FAMILY MEDICINE

## 2022-04-13 PROCEDURE — 97129 THER IVNTJ 1ST 15 MIN: CPT | Mod: GO

## 2022-04-13 PROCEDURE — 250N000012 HC RX MED GY IP 250 OP 636 PS 637

## 2022-04-13 PROCEDURE — 250N000013 HC RX MED GY IP 250 OP 250 PS 637: Performed by: FAMILY MEDICINE

## 2022-04-13 PROCEDURE — 99233 SBSQ HOSP IP/OBS HIGH 50: CPT | Performed by: CLINICAL NURSE SPECIALIST

## 2022-04-13 PROCEDURE — 99233 SBSQ HOSP IP/OBS HIGH 50: CPT | Mod: GC

## 2022-04-13 PROCEDURE — 84100 ASSAY OF PHOSPHORUS: CPT

## 2022-04-13 PROCEDURE — 94640 AIRWAY INHALATION TREATMENT: CPT

## 2022-04-13 PROCEDURE — 120N000001 HC R&B MED SURG/OB

## 2022-04-13 PROCEDURE — 99356 PR PROLONGED SERV,INPATIENT,1ST HR: CPT | Performed by: CLINICAL NURSE SPECIALIST

## 2022-04-13 RX ORDER — MAGNESIUM SULFATE HEPTAHYDRATE 40 MG/ML
2 INJECTION, SOLUTION INTRAVENOUS ONCE
Status: COMPLETED | OUTPATIENT
Start: 2022-04-13 | End: 2022-04-13

## 2022-04-13 RX ORDER — LACTOBACILLUS RHAMNOSUS GG 10B CELL
1 CAPSULE ORAL 2 TIMES DAILY
Status: DISCONTINUED | OUTPATIENT
Start: 2022-04-13 | End: 2022-04-22 | Stop reason: HOSPADM

## 2022-04-13 RX ORDER — FAMOTIDINE 20 MG/1
20 TABLET, FILM COATED ORAL AT BEDTIME
Status: DISCONTINUED | OUTPATIENT
Start: 2022-04-13 | End: 2022-04-22 | Stop reason: HOSPADM

## 2022-04-13 RX ORDER — FLUTICASONE PROPIONATE 50 MCG
1 SPRAY, SUSPENSION (ML) NASAL DAILY
Status: DISCONTINUED | OUTPATIENT
Start: 2022-04-13 | End: 2022-04-22 | Stop reason: HOSPADM

## 2022-04-13 RX ADMIN — FUROSEMIDE 20 MG: 10 INJECTION, SOLUTION INTRAMUSCULAR; INTRAVENOUS at 08:10

## 2022-04-13 RX ADMIN — TRAMADOL HYDROCHLORIDE 50 MG: 50 TABLET, COATED ORAL at 08:10

## 2022-04-13 RX ADMIN — CALCIUM 500 MG: 500 TABLET ORAL at 08:10

## 2022-04-13 RX ADMIN — VANCOMYCIN HYDROCHLORIDE 125 MG: 125 CAPSULE ORAL at 20:50

## 2022-04-13 RX ADMIN — AMOXICILLIN AND CLAVULANATE POTASSIUM 1 TABLET: 875; 125 TABLET, FILM COATED ORAL at 08:10

## 2022-04-13 RX ADMIN — IPRATROPIUM BROMIDE AND ALBUTEROL SULFATE 3 ML: 2.5; .5 SOLUTION RESPIRATORY (INHALATION) at 12:19

## 2022-04-13 RX ADMIN — IPRATROPIUM BROMIDE AND ALBUTEROL SULFATE 3 ML: 2.5; .5 SOLUTION RESPIRATORY (INHALATION) at 20:35

## 2022-04-13 RX ADMIN — GUAIFENESIN 1200 MG: 600 TABLET ORAL at 20:49

## 2022-04-13 RX ADMIN — DOXYCYCLINE HYCLATE 100 MG: 50 CAPSULE ORAL at 08:10

## 2022-04-13 RX ADMIN — GUAIFENESIN 10 ML: 100 SOLUTION ORAL at 22:51

## 2022-04-13 RX ADMIN — METOPROLOL SUCCINATE 75 MG: 50 TABLET, EXTENDED RELEASE ORAL at 20:49

## 2022-04-13 RX ADMIN — B-COMPLEX W/ C & FOLIC ACID TAB 1 MG 1 TABLET: 1 TAB at 08:11

## 2022-04-13 RX ADMIN — POTASSIUM CHLORIDE 20 MEQ: 1.5 POWDER, FOR SOLUTION ORAL at 00:38

## 2022-04-13 RX ADMIN — GUAIFENESIN 1200 MG: 600 TABLET ORAL at 08:09

## 2022-04-13 RX ADMIN — GUAIFENESIN 10 ML: 100 SOLUTION ORAL at 01:38

## 2022-04-13 RX ADMIN — SODIUM CHLORIDE SOLN NEBU 3% 3 ML: 3 NEBU SOLN at 20:35

## 2022-04-13 RX ADMIN — DOXYCYCLINE HYCLATE 100 MG: 50 CAPSULE ORAL at 20:49

## 2022-04-13 RX ADMIN — AMOXICILLIN AND CLAVULANATE POTASSIUM 1 TABLET: 875; 125 TABLET, FILM COATED ORAL at 20:49

## 2022-04-13 RX ADMIN — SIMVASTATIN 40 MG: 40 TABLET, FILM COATED ORAL at 20:49

## 2022-04-13 RX ADMIN — Medication 1 CAPSULE: at 20:49

## 2022-04-13 RX ADMIN — Medication 5 MG: at 20:49

## 2022-04-13 RX ADMIN — GABAPENTIN 200 MG: 100 CAPSULE ORAL at 20:50

## 2022-04-13 RX ADMIN — FLUTICASONE FUROATE AND VILANTEROL TRIFENATATE 1 PUFF: 200; 25 POWDER RESPIRATORY (INHALATION) at 21:02

## 2022-04-13 RX ADMIN — Medication 1 CAPSULE: at 14:59

## 2022-04-13 RX ADMIN — VANCOMYCIN HYDROCHLORIDE 125 MG: 125 CAPSULE ORAL at 16:33

## 2022-04-13 RX ADMIN — VANCOMYCIN HYDROCHLORIDE 125 MG: 125 CAPSULE ORAL at 08:10

## 2022-04-13 RX ADMIN — SODIUM CHLORIDE SOLN NEBU 3% 3 ML: 3 NEBU SOLN at 07:50

## 2022-04-13 RX ADMIN — RIVAROXABAN 15 MG: 15 TABLET, FILM COATED ORAL at 20:49

## 2022-04-13 RX ADMIN — VANCOMYCIN HYDROCHLORIDE 125 MG: 125 CAPSULE ORAL at 12:55

## 2022-04-13 RX ADMIN — FAMOTIDINE 20 MG: 20 TABLET ORAL at 20:50

## 2022-04-13 RX ADMIN — PREDNISONE 30 MG: 20 TABLET ORAL at 08:09

## 2022-04-13 RX ADMIN — BENZONATATE 100 MG: 100 CAPSULE ORAL at 00:39

## 2022-04-13 RX ADMIN — TRAMADOL HYDROCHLORIDE 50 MG: 50 TABLET, COATED ORAL at 20:49

## 2022-04-13 RX ADMIN — TAMOXIFEN CITRATE 20 MG: 10 TABLET ORAL at 20:50

## 2022-04-13 RX ADMIN — IPRATROPIUM BROMIDE AND ALBUTEROL SULFATE 3 ML: 2.5; .5 SOLUTION RESPIRATORY (INHALATION) at 16:08

## 2022-04-13 RX ADMIN — IPRATROPIUM BROMIDE AND ALBUTEROL SULFATE 3 ML: 2.5; .5 SOLUTION RESPIRATORY (INHALATION) at 07:49

## 2022-04-13 RX ADMIN — FLUTICASONE PROPIONATE 1 SPRAY: 50 SPRAY, METERED NASAL at 08:10

## 2022-04-13 RX ADMIN — MAGNESIUM SULFATE HEPTAHYDRATE 2 G: 40 INJECTION, SOLUTION INTRAVENOUS at 10:37

## 2022-04-13 RX ADMIN — GABAPENTIN 200 MG: 100 CAPSULE ORAL at 08:11

## 2022-04-13 RX ADMIN — FLUTICASONE PROPIONATE 1 SPRAY: 50 SPRAY, METERED NASAL at 01:38

## 2022-04-13 ASSESSMENT — ACTIVITIES OF DAILY LIVING (ADL)
ADLS_ACUITY_SCORE: 15
ADLS_ACUITY_SCORE: 15
ADLS_ACUITY_SCORE: 19
ADLS_ACUITY_SCORE: 19
ADLS_ACUITY_SCORE: 17
ADLS_ACUITY_SCORE: 19
ADLS_ACUITY_SCORE: 15
ADLS_ACUITY_SCORE: 19
ADLS_ACUITY_SCORE: 15
ADLS_ACUITY_SCORE: 15
ADLS_ACUITY_SCORE: 19
ADLS_ACUITY_SCORE: 15
ADLS_ACUITY_SCORE: 17
ADLS_ACUITY_SCORE: 19
ADLS_ACUITY_SCORE: 17

## 2022-04-13 NOTE — CONSULTS
"ACUPUNCTURIST TREATMENT NOTE    Name: Irene León  :  1945  MRN:  [id number]    Acupuncture Treatment  Patient Type: Medical  Intervention Reason: Desires Experience, Other  Pre-session Other ratin  Post-session Other ratin  Patient complaint:: shortness of breath  Initial insertions: Ken 17, Du 20, Jana 7-Jana 9, Ki 3, Ki 6         \"Risks and benefits of acupuncture were discussed with patient. Consent for treatment was given. We thank you for the referral.\"     Kendy Pritchard L.Ac.     Date:  2022  Time:  12:06 PM    "

## 2022-04-13 NOTE — PROGRESS NOTES
Writer discussed pt's desire for a purewick to be placed.  Pt is on 20mg IV lasix BID. We discussed need to be careful with loose bowels and having a purewick as it can cause infection.  Pt assures this writer she is continent of bowels and will call to have a bm.

## 2022-04-13 NOTE — CONSULTS
Care Management Follow Up    Length of Stay (days): 6    Expected Discharge Date: 04/14/2022     Concerns to be Addressed: denies needs/concerns at this time, care coordination/care conferences, discharge planning     Patient plan of care discussed at interdisciplinary rounds: Yes    Anticipated Discharge Disposition: Home Care     Anticipated Discharge Services: None  Anticipated Discharge DME: None    Patient/family educated on Medicare website which has current facility and service quality ratings: no (Not indicated.)  Education Provided on the Discharge Plan:    Patient/Family in Agreement with the Plan: yes    Referrals Placed by CM/SW:  (Not indicated.)  Private pay costs discussed: Not applicable    Additional Information:  2:24 PM  RODOLFO placed call to daughter Leelee.  She states she is coming into the hospital and would like to meet face to face.  SW went to pt's room.  Daughter was in a meeting with palliative outside room.   SW spoke with pt around discharge plans.  Pt states she will return home with resumption of [facility] services when medically ready.        PATRIC Pierson

## 2022-04-13 NOTE — PROGRESS NOTES
RESPIRATORY CARE NOTE     Patient Name: Irene León  Today's Date: 4/13/2022       Pt continues to receive duoneb. BS are diminished crackles. Pt is on 3 lpm of oxygen via NC, SpO2 is 95%. Post treatment there is increased aeration. Pt also perceives improvement.  RT encouraged deep breathing and coughing techniques .  RT will continue to monitor and assess.     Rosanne Jaimes, RT

## 2022-04-13 NOTE — PROGRESS NOTES
[facility]    Progress Note - Hospitalist Service       Date of Admission:  4/7/2022    Assessment & Plan    77-year-old female with PMH significant for HFrEF, HTN, factor V Leiden on Xarelto, CKD, osteoporosis, and 35+ pound unintentional weight loss in the past 1 year who was admitted on 4/7/2022 with shortness of breath and was found to have radiographic evidence consistent with pneumonia, in the setting of severe COPD, noted to be c-diff positive during admission, overall improving.     Acute Hypoxic Respiratory Failure  Community-acquired left mid and upper lobe pneumonia  COPD Exacerbation  Hyperchloremic metabolic acidosis  Elevated lactate, improved  Leukocytosis, mildly improving  Persistent leukocytosis but in the setting of steroids for COPD exacerbation. CRP up trending today. Respiratory status does not seem to be improving as expected. Repeat CXR 4/10 showed continued infiltrate, enlarged cardiac silhouette. Does use some oxygen intermittently at home, 2.5 L oxygen recommended outpatient.   - Pulmonology consulted, appreciate recs  - Palliative consulted, appreciate recs   - Continue Doxycycline BID PO   - Continue Augmentin BID PO   - Prednisone taper 30 mg, 20 mg, 10 mg, 5 mg for 3 days each  - Continuous pulse oximetry  - Supplemental O2 by NC, keep SPO2 88% to 92%. Avoid over oxygenation  - Scheduled Duonebs Q4hrs with Mucomyst  - PRN Albuterol Nebs Q2Hrs  - Continue Trelegy Ellipta 1 puff at bedtime   - Mucinex BID   -Tessalon PRN  - BMP, CBC, CRP  - Repeat imaging recommended in 4-6 weeks for resolution    C-diff Positive  Diarrhea  Patient began having loose stools during admission. C-diff+ 4/11/22. Still weak from frequent stools.   - Vanco  mg QID x 10 days (4/12-)  - Start probiotic    Goals of Care  DNR/DNI  Overall, poor long term prognosis. Has severe COPD with frequent recurrent infections. Patient expressed not wanting to return to hospital. Daughter,  Leelee, is helping with her healthcare management.   - Palliative care consulted, appreciate recs    HFrEF   HTN  PTA took furosemide 80 mg total daily.  Received IV Lasix 20 mg 4/10.  Echo showed reduced EF from prior.  Hyperinflated lungs without radiographic evidence to suggest heart failure exacerbation on CXR 4/10.  Could consider stress test prior to discharge pending clinical improvement.  - Continue PTA Lasix 40 mg daily  - Continue PTA Metoprolol to 75 mg XL daily   - Daily weights  - Strict I/O, pure wick if patient tolerates    Delirium, Resolved  Agitation  Gabapentin was decreased to 200 mg twice daily.  Vesicare and Myrbetriq were discontinued.  Patient's room was changed to be closer to nursing station.  No indication for one-to-one at this time.  Reevaluate daily.  - Delirium precautions  - Bundle cares  - Continue melatonin 5 mg at bedtime  - Open blinds, turn on lights during the day    Weight loss  Multifactorial in the setting of COPD, decreased oral intake  -RD consulted, appreciate recommendations and assistance    Afib w/ RVR on Xarelto  Tachycardia  Prolonged QTC  Hx TIA, Factor V Leiden  Metoprolol was initially on hold for hypotension, now resumed. Did have episode of Afib w RVR to 160's day 1 after Metoprolol was held for hypotension. Telemetry showing sinus tachycardia with HR low 80s, BBB, PAC, PVCs.  - Continuous telemetry  - Avoid QT prolonging medications  -Trend and replete electrolytes as needed   - Continue PTA Metoprolol  - Continue Xarelto as at home     ANATOLIY on CKD, Improved  Admission Cr 2.44 BUN 36 with a variable baseline 1.5-2.0.  Creatinine has been downtrending with IV hydration.    - Daily BMP  - Avoid nephrotoxins    Overactive bladder  Discontinued PTA Mirabegron 50 mg and Solifenacin 5 mg in the setting of acute delirium.  -Pure wick if patient tolerates     Normocytic normochromic anemia, stable  Admission Hgb of 10.9.  Hgbs 13.3 - 9.8 in the 2 months PTA. No ssx GI  bleed.  -CBC as above    Fragile skin  -Wrap IV, avoid adhesives  -WOC    Chronic Conditions and Medications  Breast Cancer  DCIS, ER/KY positive, HER-2 negative, s/p left breast lumpectomy and left axillary sentinel lymph node biopsy.  - Cont. PTA Tamoxifen 20 daily  Osteoporosis  - Hold PTA VitD and Ca supplementation  Allergies  - Hold PTA Cetirizine and Flonase  GERD  - Cont. PTA 20 mg pepcid at bedtime  Chronic Pain secondary to T12 compression fracture  - Continue gabapentin 200 mg BID (dose decreased this admission)  - Continue Tramadol 50 mg Q12h as at home     Diet: Snacks/Supplements Adult: Ensure Enlive; With Meals  Regular Diet Adult    DVT Prophylaxis: Xarelto  Hayes Catheter: Not present  Fluids: PO  Central Lines: PRESENT  PICC Double Lumen 04/11/22 Right Brachial vein medial-Site Assessment: WDL  Cardiac Monitoring: ACTIVE order. Indication: QTc prolonging medication (48 hours)  Code Status: No CPR- Do NOT Intubate      Disposition Plan   Expected Discharge: 04/14/2022     Anticipated discharge location: home with family;home with help/services       The patient's care was discussed with the Attending Physician, Dr. Janae Perez.    I spoke with Leelee, the patient's daughter, on the phone.     Irene Lombardo MD   [facility] Residency Service  [facility]      ______________________________________________________________________    Interval History    She feels she is slowly improving. Still feels very weak from frequent stools. Afraid to go home today due to urgency and weakness.     Physical Exam   Vital Signs: Temp: (!) 96.6  F (35.9  C) Temp src: Oral BP: 124/68 Pulse: 85   Resp: 24 SpO2: 96 % O2 Device: Nasal cannula Oxygen Delivery: 2.5 LPM  Weight: 120 lbs 6.4 oz  Physical Exam   Constitutional: Awake, alert, cooperative, oriented to person, place, and day.   Eyes: Lids and lashes normal, extra ocular muscles intact, sclera clear, conjunctiva normal.  ENT: Normocephalic,  without obvious abnormality, atraumatic  Lungs:  LS diminished throughout. Poor air flow. Scattered +Rhonchi, no wheezing on exam.   Cardiovascular: Regular rate and rhythm, normal S1 and S2, +JOHN PAUL  Abdomen: Soft, non tender, no distention or guarding  Musculoskeletal: Able to ambulate  Neurologic: Awake and alert. Cranial nerves II-XII are grossly intact.  Neuropsychiatric: Normal affect, mood, orientation, memory seems poor  Skin: Very fragile, bruising      Data   Recent Labs   Lab 04/13/22  0446 04/12/22  2221 04/12/22  1627 04/12/22  0550 04/11/22  1732 04/11/22  1316 04/11/22  0417   WBC 26.3*  --   --  28.8*  --  36.3* 32.9*   HGB 10.3*  --   --  8.8*  --  11.7 10.4*   MCV 83  --   --  83  --  83 86     --   --  301  --  323 277     --   --  145  --   --  144   POTASSIUM 3.7 3.4* 3.4* 3.4*   < >  --  3.4*   CHLORIDE 108*  --   --  111*  --   --  113*   CO2 26  --   --  21*  --   --  19*   BUN 26  --   --  26  --   --  29*   CR 1.10  --   --  1.23*  --   --  1.29*   ANIONGAP 9  --   --  13  --   --  12   MESSI 8.2*  --   --  8.5  --   --  8.0*   GLC 83  --   --  85  --   --  112    < > = values in this interval not displayed.     No results found for this or any previous visit (from the past 24 hour(s)).  Medications     - MEDICATION INSTRUCTIONS -         amoxicillin-clavulanate  1 tablet Oral Q12H VY     calcium carbonate 500 mg (elemental)  500 mg Oral Daily     doxycycline hyclate  100 mg Oral Q12H VY     famotidine  20 mg Oral Q48H     fluticasone  1 spray Both Nostrils Daily     fluticasone-vilanterol  1 puff Inhalation At Bedtime    And     umeclidinium  1 puff Inhalation At Bedtime     furosemide  20 mg Intravenous BID     [Held by provider] furosemide  40 mg Oral Daily     gabapentin  200 mg Oral BID     guaiFENesin  1,200 mg Oral BID     ipratropium - albuterol 0.5 mg/2.5 mg/3 mL  3 mL Nebulization 4x daily     magnesium sulfate  2 g Intravenous Once     melatonin  5 mg Oral At Bedtime      metoprolol succinate ER  50 mg Oral At Bedtime     multivitamin RENAL  1 tablet Oral Daily     predniSONE  30 mg Oral Daily    Followed by     [START ON 4/16/2022] predniSONE  20 mg Oral Daily    Followed by     [START ON 4/19/2022] predniSONE  10 mg Oral Daily    Followed by     [START ON 4/22/2022] predniSONE  5 mg Oral Daily     rivaroxaban ANTICOAGULANT  15 mg Oral At Bedtime     simvastatin  40 mg Oral At Bedtime     sodium chloride  3 mL Nebulization BID     tamoxifen  20 mg Oral QPM     traMADol  50 mg Oral BID     vancomycin  125 mg Oral 4x Daily

## 2022-04-13 NOTE — PROGRESS NOTES
04/13/22 1315   Cognitive Assessments   Cognitive Assessments Completed [facility] Mental Status Exam ([facility]):  Total Score out of /30 27/30   Norms 27-30 equals normal   Domains assessed: orientation, memory, attention, executive functions       Pt reports she has a high school education.  Pt was wearing her glasses during this cognitive screen.  Today's score: normal.  No follow up needed at this time.  Hany Banks, OTR/L  4/13/2022

## 2022-04-13 NOTE — PLAN OF CARE
Goal Outcome Evaluation:      Alert and oriented this shift. BP elevated. Pt has frequent congested cough. Tessalon utilized with poor effect. BFM paged and order for robitussin and flonase obtained. Saturations maintained within goal on 2-2.5 lpm. Denies pain. Continues on K Mag and Phos protocol. Ran protocols this AM, magnesium to be replaced IV. Continues on Tele; SR with BBB.

## 2022-04-13 NOTE — PLAN OF CARE
Patient alert and oriented X4. Patient on 2L oxygen via nasal cannula, which is reported her baseline oxygen rate. Potassium was low at 3.4 and oral potassium given. Potassium redraw at 2200 with same level of 3.4 reported. Will continue to run potassium protocol. Complaint of cough and slight shortness of breath. Scheduled neb treatment given by RT. PRN tessalon jimmy given with some relief reported by patient. IV lasix given with good urine output. No occurences of diarrhea today. No pain reported by patient today. Encouraged to shift weight in bed frequently to prevent skin breakdown. Patient resting comfortably.     Problem: COPD (Chronic Obstructive Pulmonary Disease) Comorbidity  Goal: Maintenance of COPD Symptom Control  Outcome: Ongoing, Not Progressing  Intervention: Maintain COPD-Symptom Control  Recent Flowsheet Documentation  Taken 4/12/2022 2010 by Marlene Sims, RN  Medication Review/Management: medications reviewed  Taken 4/12/2022 1600 by Marlene Sims, RN  Medication Review/Management: medications reviewed     Problem: Plan of Care - These are the overarching goals to be used throughout the patient stay.    Goal: Absence of Hospital-Acquired Illness or Injury  Outcome: Ongoing, Progressing  Intervention: Identify and Manage Fall Risk  Recent Flowsheet Documentation  Taken 4/12/2022 2010 by Marlene Sims, RN  Safety Promotion/Fall Prevention:    activity supervised    bed alarm on    clutter free environment maintained    fall prevention program maintained    lighting adjusted    room near nurse's station  Taken 4/12/2022 1600 by Marlene Sims, RN  Safety Promotion/Fall Prevention:    activity supervised    bed alarm on    clutter free environment maintained    fall prevention program maintained    lighting adjusted    room near nurse's station  Intervention: Prevent and Manage VTE (Venous Thromboembolism) Risk  Recent Flowsheet Documentation  Taken 4/12/2022 1930 by Marlene Sims,  RN  Activity Management: activity adjusted per tolerance     Problem: Plan of Care - These are the overarching goals to be used throughout the patient stay.    Goal: Plan of Care Review/Shift Note  Description: The Plan of Care Review/Shift note should be completed every shift.  The Outcome Evaluation is a brief statement about your assessment that the patient is improving, declining, or no change.  This information will be displayed automatically on your shift note.  Outcome: Ongoing, Progressing  Flowsheets (Taken 4/12/2022 1674)  Plan of Care Reviewed With: patient

## 2022-04-13 NOTE — PROGRESS NOTES
There was a spiritual consult placed concerning EOL concerns.  Pt was in bed lightly sleeping. She opened her eyes and greeted . Pt appeared tired and weak. When asked if she would like a prayer she said yes.     provided support through brief conversation, presence and prayer.    Spiritual Care is available as needed or requested.    VERONICA Salazar.

## 2022-04-13 NOTE — PLAN OF CARE
Problem: Plan of Care - These are the overarching goals to be used throughout the patient stay.    Goal: Absence of Hospital-Acquired Illness or Injury  Intervention: Identify and Manage Fall Risk  Recent Flowsheet Documentation  Taken 4/13/2022 0820 by Airam Harris RN  Safety Promotion/Fall Prevention:   activity supervised   assistive device/personal items within reach   bed alarm on   fall prevention program maintained   mobility aid in reach   nonskid shoes/slippers when out of bed   patient and family education   room door open   room near nurse's station   supervised activity  Intervention: Prevent and Manage VTE (Venous Thromboembolism) Risk  Recent Flowsheet Documentation  Taken 4/13/2022 0820 by Airam Harris RN  Activity Management: up to bedside commode  Goal: Optimal Comfort and Wellbeing  Outcome: Ongoing, Progressing     Problem: COPD (Chronic Obstructive Pulmonary Disease) Comorbidity  Goal: Maintenance of COPD Symptom Control  Outcome: Ongoing, Progressing  Intervention: Maintain COPD-Symptom Control  Recent Flowsheet Documentation  Taken 4/13/2022 0820 by Airam Harris RN  Medication Review/Management: medications reviewed     Problem: Infection  Goal: Absence of Infection Signs and Symptoms  Outcome: Ongoing, Progressing  Intervention: Prevent or Manage Infection  Recent Flowsheet Documentation  Taken 4/13/2022 0820 by Airam Harris RN  Isolation Precautions: enteric precautions maintained   Goal Outcome Evaluation:  Pt improving, diarrhea has changed from loose to soft/mushy stools.  Not calling as often for bowel movements as well.  Due to IV lasix pt requested purewick.  Writer reviewed with charge RN and she agreed as long as pt is continent of bowel ok for purewick.  Writer reviewed bowel continence and need to maintain precautions to help prevent UTI, pt verbalizes understanding.  Denies pain today.  Pt's daughter here and had long discussion with palliative  "care.      Please call pt's daughter prior to any \"serious\" discussions as she wants to be present for support for her mother.  She is an RN at [employer] and is very involved in her mother's care.   "

## 2022-04-13 NOTE — PROGRESS NOTES
[facility]  Palliative Care Daily Progress Note      Code status: No CPR- Do NOT Intubate     Impressions, Recommendations & Counseling     SYMPTOM ASSESSMENT:  1.  Shortness of breath secondary to advanced COPD  -Appreciate pulmonary and HMS service recommendations and management  -Oxygen as at home-3 L nasal cannula  -Continue with prednisone taper, nebulizers, inhalers, doxycycline, Augmentin and Mucinex as ordered     2.  Generalized weakness and fatigue secondary to advanced COPD and activity intolerance.  -At baseline, patient limited in mobility in her home.  Difficult for her to get out and about even to doctor's appointments.     3.  Chronic pain syndrome with chronic back pain from T12 compression fracture  3/9/2021  and QTc 516 on EKG.  - Patient sees orthopedics for injections. Epidural injection 1/20/2022.  - Gabapentin 200  mg by mouth twice daily (normally on 600 mg po BID).  - ContinueTramadol 50 mg by mouth twice daily as needed for pain.   - Continue Salonpas and Voltaren.     4. Anorexia and weight loss (30 pound weight loss since 3/2021) - weight stable  - Continue to monitor.   - Patient watching diet and intake. Eating twice a day.     ADVANCED CARE PLANNING/GOALS OF CARE DISCUSSION  -CODE STATUS: Now DNR/DNI.  Patient acknowledges that resuscitation and intubation would be burdensome.    - Hopeful for recovery.  Goals are otherwise life-prolonging  - POLST document completed today.  -Filled out honoring choices document.  Still needs to be notarized.  Will check on notary presents tomorrow.  -Patient should follow-up in outpatient palliative care clinic for ongoing goals of care discussion, symptom management and patient and family support.      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  Patient was admitted to [facility] on 4/7/2022 with complaints of chest tightness and shortness of breath.  Currently being treated for COPD exacerbation and possible pneumonia based on chest x-ray and VQ scan and elevated white count.  Patient then found to be positive for C. Difficile.     Today, the patient was seen for:  Shortness of breath and goals of care discussion    Prognosis, Goals, or Advance Care Planning was addressed today with: Yes.  Mood, coping, and/or meaning in the context of serious illness were addressed today: Yes.  Summary/Comments: Reviewed POLST document and honoring choices with patient.  Both of these documents completed today.  POLST document reflects wishes are DNR/I, selective care and treatment in which 5 antibiotics.  Patient does not believe she would want tube feeding or feeding tube make decisions at this time.  Patient updated her honoring choices to reflect her daughter, Leelee and son, Yovanny to be her primary decision makers.  Her spouse is advancing Alzheimer's and would not be a reliable decision-maker for her.  On the document patient also checks that she would not want to be resuscitated or intubated if in a persistent vegetative state was told she had a terminal illness.  We will work to get this notarized when notary present tomorrow.    [id number] reviewed patient's current condition with daughter Leelee and answered her questions.  Discussed differences between hospice and palliative care/specialty.  Leelee is delighted that CODE STATUS has been decided upon and that it is documented on paper.  This provides her with a lot of reduced worry and concern of doing harmful interventions to her mother.  She expresses anticipatory grief about her mom's illness and  her fragile COPD status.  Support given to daughter today.  She wishes to keep her parents at home for as long as she is able to.  Clean her brother from [place] comments to spend more time with patient while she is still more relatively well.            Interval History:     Chart review/discussion with unit or clinical team members:   Dr. Lombardo    Per patient or family/caregivers today:  Met with patient's daughter, Leelee, from [id number] to discuss goals of care, update her on patient's care preferences and goals of care with patient and daughter.    Key Palliative Symptoms:  # Pain severity the last 12 hours: low  # Dyspnea severity the last 12 hours: moderate  # Nausea severity the last 12 hours: none  # Anxiety severity the last 12 hours: low  Anorexia: Moderate-severe  Weakness and fatigue: Severe             Review of Systems:     Besides above, an additional system ROS was reviewed and is unremarkable          Medications:     I have reviewed this patient's medication profile and medications during this hospitalization.           Physical Exam:   Temp:  [96.6  F (35.9  C)-98.5  F (36.9  C)] 96.6  F (35.9  C)  Pulse:  [82-99] 85  Resp:  [16-24] 24  BP: (106-173)/(52-84) 124/68  SpO2:  [91 %-97 %] 96 %    General appearance: alert, appears stated age, cooperative, cachetic and fatigued  Head: Normocephalic, without obvious abnormality, atraumatic, temporal wasting noted  Eyes: sclera anicteric. Lids and lashes normal.  Nose: no discharge, NC in place  Neck: no JVD  Lungs: clear to auscultation; decreased bases; accessory muscle use noted  Heart: regular rate   Abdomen: Soft, nontender, nondistended, positive bowel sounds x4  Extremities: No edema noted.  Moves all extremities in bed.  Skin: Skin color, texture, turgor normal. No rashes or lesions  Neurologic: Alert.  Oriented x4.  Calm.             Data Reviewed:     Pertinent Labs  Lab Results: personally reviewed.   Lab Results   Component Value Date      2022    CO2 26 2022    BUN 26 2022     Lab Results   Component Value Date    WBC 26.3 2022    HGB 10.3 2022    HCT 30.0 2022    MCV 83 2022     2022     AST   Date Value Ref Range Status   2021 23 0 - 40 U/L Final     ALT   Date Value Ref Range Status   2021 12 0 - 45 U/L Final     Alkaline Phosphatase   Date Value Ref Range Status   2022 51 45 - 120 U/L Final     Albumin   Date Value Ref Range Status   2021 3.9 3.5 - 5.0 g/dL Final         Radiology Results  NM Lung Scan Perfusion Particulate    Result Date: 2022  EXAM: NM LUNG SCAN PERFUSION PARTICULATE LOCATION: [facility] DATE/TIME: 2022 5:18 PM INDICATION: PE suspected, high prob COMPARISON: Chest x-ray 2022, V/Q scan 2020, CT chest exam 2019 TECHNIQUE: 7.9 mCi technetium-99m MAA, IV. Standard lung perfusion imaging. FINDINGS: Heterogeneous perfusion throughout the left lung with numerous small subsegmental perfusion defects similar to the previous VQ scan. Perfusion to the right lung is more homogeneous, with a few subtle areas of decreased activity also similar to the prior exam. Chest x-ray from today demonstrates emphysema with diffuse opacities in the left upper and mid left lung suggestive of pneumonia.     IMPRESSION: 1.  Chronic perfusion abnormalities throughout both lungs similar to the previous exam. No new perfusion abnormalities. Recent chest x-ray is suggestive of left mid and upper lung pneumonia.    Echocardiogram Complete    Result Date: 2022  [id number]^TEREZA^GERALD^[place] [address]  Name: JUDY ZARCO MRN: [id number] : 1945 Study Date: 2022 02:58 PM Age: 77 yrs Gender: Female Patient Location: [facility] Reason For Study: Syncope Ordering Physician: GERALD STARKS Performed By: SUGEY  BSA: 1.4 m2 Height: 59 in Weight: 112  lb ______________________________________________________________________________ Procedure Complete Portable Echo Adult. ______________________________________________________________________________ Interpretation Summary  1. The left ventricle is normal in size. Left ventricular systolic performance is moderately reduced. The ejection fraction is estimated to be 35-40%. 2. There is moderate global reduction in left ventricular systolic performance. 3. There is abnormal septal motion possibly related to altered electrical activation due to bundle branch block. 4. There is mild-moderate, to perhaps moderate, tricuspid insufficiency. 5. Normal right ventricular size with mildly reduced right ventricular systolic performance. 6. There is mild right atrial enlargement. 7. Right ventricular systolic pressure relative to right atrial pressure is mildly increased. The pulmonary artery pressure is estimated to be 45-50 mmHg plus right atrial pressure (the IVC is mildly dilated).  When compared to the prior real-time echocardiogram dated 28 July 2021, there has been a mild further diminution in left ventricular systolic performance (it is this reader's impression that left ventricular systolic performance was mild-moderately reduced with an ejection fraction 40-45% on the prior examination). ______________________________________________________________________________ Left ventricle: The left ventricle is normal in size. Left ventricular systolic performance is moderately reduced. The ejection fraction is estimated to be 35-40%. There is moderate global reduction in left ventricular systolic performance. There is abnormal septal motion possibly related to altered electrical activation due to bundle branch block. Left ventricular wall thickness is normal. 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  Assessment of left atrial pressure (LAP): The cumulative findings are indeterminate in evaluating  left atrial pressure.  Right ventricle: Normal right ventricular size with mildly reduced right ventricular systolic performance.  Left atrium: There is borderline left atrial enlargement.  Right atrium: There is mild right atrial enlargement.  IVC: The IVC is mildly dilated.  Aortic valve: The aortic valve is comprised of three cusps.  Mitral valve: There is mild mitral annular calcification. There is trace mitral insufficiency.  Tricuspid valve: The tricuspid valve is grossly morphologically normal. There is mild-moderate, to perhaps moderate, tricuspid insufficiency.  Pulmonic valve: The pulmonic valve is grossly morphologically normal.  Thoracic aorta: The aortic root and proximal ascending aorta are of normal dimension.  Pericardium: There is no significant pericardial effusion. ______________________________________________________________________________ ______________________________________________________________________________ MMode/2D Measurements & Calculations RVDd: 3.5 cm IVSd: 0.98 cm LVIDd: 4.1 cm LVIDs: 3.3 cm LVPWd: 0.90 cm FS: 19.4 % LV mass(C)d: 122.9 grams LV mass(C)dI: 85.3 grams/m2 Ao root diam: 2.8 cm LA dimension: 3.5 cm asc Aorta Diam: 2.8 cm LA/Ao: 1.2 LVOT diam: 1.8 cm LVOT area: 2.6 cm2  LA Volume Indexed (AL/bp): 28.8 ml/m2 RWT: 0.44  Time Measurements MM HR: 98.0 BPM  Doppler Measurements & Calculations MV E max carolyn: 89.4 cm/sec MV A max carolyn: 135.9 cm/sec MV E/A: 0.66 MV dec time: 0.12 sec LV V1 max PG: 3.2 mmHg LV V1 max: 89.2 cm/sec LV V1 VTI: 18.3 cm SV(LVOT): 48.2 ml SI(LVOT): 33.4 ml/m2 TR max carolyn: 348.4 cm/sec TR max P.6 mmHg E/E' av.9 Lateral E/e': 11.0 Medial E/e': 16.8  ______________________________________________________________________________ Report approved by: Deepak Anderson 2022 04:24 PM       US Lower Extremity Venous Duplex Bilateral    Result Date: 2022  EXAM: US LOWER EXTREMITY VENOUS DUPLEX BILATERAL LOCATION: [facility] DATE/TIME: 4/7/2022 3:49 PM INDICATION: hypoxia, hypotension; leg swelling COMPARISON: None. TECHNIQUE: Venous Duplex ultrasound of bilateral lower extremities with and without compression, augmentation and duplex. Color flow and spectral Doppler with waveform analysis performed. FINDINGS: Exam includes the common femoral, femoral, popliteal veins as well as segmentally visualized deep calf veins and greater saphenous vein. RIGHT: No deep vein thrombosis. No superficial thrombophlebitis. No popliteal cyst. LEFT: No deep vein thrombosis. No superficial thrombophlebitis. No popliteal cyst.     IMPRESSION: 1.  No deep venous thrombosis in the bilateral lower extremities.    XR Chest Port 1 View    Result Date: 4/10/2022  EXAM: XR CHEST PORTABLE 1 VIEW LOCATION: [facility] DATE/TIME: 04/10/2022, 7:56 AM INDICATION: Shortness of breath. COMPARISON: CT of the chest, abdomen, and pelvis performed 04/08/2022. Chest radiograph performed 04/07/2022.     IMPRESSION: Ill-defined consolidation and infiltrate in the left upper lobe is again noted. Mild hyperinflation both lungs. The right lung is otherwise clear. Tortuous calcified thoracic aorta. Heart size has increased in prominence compared to 04/07/2022. Pulmonary vascularity is within normal limits where seen. Old right rib fractures. Right PICC line has been removed.     XR Chest Port 1 View    Result Date: 4/7/2022  EXAM: XR CHEST PORT 1 VIEW LOCATION: [facility] DATE/TIME: 4/7/2022 4:49 PM INDICATION: Cough. COMPARISON: 06/02/2021.     IMPRESSION: New moderate opacity in the left mid to upper lung suggestive of pneumonia given history of cough. However, this requires follow-up to complete resolution to exclude an underlying lesion (6-8 week follow-up radiographs). Right PICC tip near the cavoatrial junction. Normal heart size. Atherosclerosis. No significant pleural effusion.     CT Chest Abdomen  Pelvis w/o Contrast    Result Date: 4/8/2022  EXAM: CT CHEST ABDOMEN PELVIS W/O CONTRAST LOCATION: [facility] DATE/TIME: 4/8/2022 2:17 PM INDICATION: History of breast cancer. Pneumonia. Weight loss. COMPARISON: CT chest 6/12/2020, CT chest/abdomen/pelvis 6/11/2019 TECHNIQUE: CT scan of the chest, abdomen, and pelvis was performed without IV contrast. Multiplanar reformats were obtained. Dose reduction techniques were used. CONTRAST: None. FINDINGS: LUNGS AND PLEURA: Moderate irregular consolidation in the posterior left upper lobe. Confluent emphysema. Mild bibasilar atelectasis/scar. Trace pleural effusions. Stable mild diffuse bronchial wall thickening. A few stable benign pulmonary nodules. MEDIASTINUM/AXILLAE: Right PICC. Mediastinal lymphadenopathy with the largest largest being a right paratracheal node measuring 1.6 cm in short axis, image 48:3. Small pericardial effusion. The main pulmonary artery is enlarged at 3.0 cm which may be seen with pulmonary artery hypertension. CORONARY ARTERY CALCIFICATION: Moderate. HEPATOBILIARY: Normal. PANCREAS: Normal. SPLEEN: Normal. ADRENAL GLANDS: Normal. KIDNEYS/BLADDER: Low-lying right kidney. Subcentimeter renal hypodensities which are too small to characterize. No hydronephrosis. BOWEL: No obstruction or inflammatory change. Normal appendix. LYMPH NODES: Normal. VASCULATURE: Stable ectasia of the abdominal aorta measuring 2.9 x 2.3 cm, image 169:3. Moderate atherosclerosis. PELVIC ORGANS: Normal. MUSCULOSKELETAL: Left hip arthroplasty. Osseous demineralization. Degenerative changes of the spine. Remote left rib fractures. T12 compression fracture with 80% loss of height which is likely remote.     IMPRESSION: 1.  Moderate left upper lobe consolidation. This is consistent with pneumonia. Recommend a follow-up chest radiograph in 4-6 weeks to document resolution. 2.  Mediastinal lymphadenopathy. 3.  No acute abnormality in the abdomen or  pelvis.     TTS: I have personally spent a total of 70 minutes today/prolonged care code reviewing patient's medical record, consultation with the medical providers, assessing patient and symptoms and with more than 50% of this time spent providing emotional support to patient and her daughter, Leelee (family meeting with Leelee [phone]).    LEYLA Walker, APRN, CNS  Palliative Care  [phone]

## 2022-04-14 ENCOUNTER — APPOINTMENT (OUTPATIENT)
Dept: RADIOLOGY | Facility: CLINIC | Age: 77
DRG: 177 | End: 2022-04-14
Payer: MEDICARE

## 2022-04-14 ENCOUNTER — APPOINTMENT (OUTPATIENT)
Dept: OCCUPATIONAL THERAPY | Facility: CLINIC | Age: 77
DRG: 177 | End: 2022-04-14
Payer: MEDICARE

## 2022-04-14 ENCOUNTER — DOCUMENTATION ONLY (OUTPATIENT)
Dept: OTHER | Facility: CLINIC | Age: 77
End: 2022-04-14
Payer: MEDICARE

## 2022-04-14 PROBLEM — R94.31 PROLONGED Q-T INTERVAL ON ECG: Status: ACTIVE | Noted: 2022-04-14

## 2022-04-14 LAB
ANION GAP SERPL CALCULATED.3IONS-SCNC: 9 MMOL/L (ref 5–18)
ATRIAL RATE - MUSE: 75 BPM
BASOPHILS # BLD AUTO: 0 10E3/UL (ref 0–0.2)
BASOPHILS NFR BLD AUTO: 0 %
BUN SERPL-MCNC: 25 MG/DL (ref 8–28)
C REACTIVE PROTEIN LHE: 10.1 MG/DL (ref 0–0.8)
CALCIUM SERPL-MCNC: 7.9 MG/DL (ref 8.5–10.5)
CHLORIDE BLD-SCNC: 105 MMOL/L (ref 98–107)
CO2 SERPL-SCNC: 28 MMOL/L (ref 22–31)
CREAT SERPL-MCNC: 0.91 MG/DL (ref 0.6–1.1)
DIASTOLIC BLOOD PRESSURE - MUSE: NORMAL MMHG
EOSINOPHIL # BLD AUTO: 0.1 10E3/UL (ref 0–0.7)
EOSINOPHIL NFR BLD AUTO: 1 %
ERYTHROCYTE [DISTWIDTH] IN BLOOD BY AUTOMATED COUNT: 14.5 % (ref 10–15)
GFR SERPL CREATININE-BSD FRML MDRD: 65 ML/MIN/1.73M2
GLUCOSE BLD-MCNC: 97 MG/DL (ref 70–125)
HCT VFR BLD AUTO: 30.6 % (ref 35–47)
HGB BLD-MCNC: 10.3 G/DL (ref 11.7–15.7)
IMM GRANULOCYTES # BLD: 0.5 10E3/UL
IMM GRANULOCYTES NFR BLD: 3 %
INTERPRETATION ECG - MUSE: NORMAL
LYMPHOCYTES # BLD AUTO: 2 10E3/UL (ref 0.8–5.3)
LYMPHOCYTES NFR BLD AUTO: 10 %
MAGNESIUM SERPL-MCNC: 2 MG/DL (ref 1.8–2.6)
MCH RBC QN AUTO: 28.1 PG (ref 26.5–33)
MCHC RBC AUTO-ENTMCNC: 33.7 G/DL (ref 31.5–36.5)
MCV RBC AUTO: 83 FL (ref 78–100)
MONOCYTES # BLD AUTO: 1.8 10E3/UL (ref 0–1.3)
MONOCYTES NFR BLD AUTO: 9 %
NEUTROPHILS # BLD AUTO: 14.8 10E3/UL (ref 1.6–8.3)
NEUTROPHILS NFR BLD AUTO: 77 %
NRBC # BLD AUTO: 0 10E3/UL
NRBC BLD AUTO-RTO: 0 /100
P AXIS - MUSE: 76 DEGREES
PHOSPHATE SERPL-MCNC: 3.5 MG/DL (ref 2.5–4.5)
PLATELET # BLD AUTO: 310 10E3/UL (ref 150–450)
POTASSIUM BLD-SCNC: 3.5 MMOL/L (ref 3.5–5)
PR INTERVAL - MUSE: 126 MS
QRS DURATION - MUSE: 126 MS
QT - MUSE: 458 MS
QTC - MUSE: 511 MS
R AXIS - MUSE: -38 DEGREES
RBC # BLD AUTO: 3.67 10E6/UL (ref 3.8–5.2)
SARS-COV-2 RNA RESP QL NAA+PROBE: NEGATIVE
SODIUM SERPL-SCNC: 142 MMOL/L (ref 136–145)
SYSTOLIC BLOOD PRESSURE - MUSE: NORMAL MMHG
T AXIS - MUSE: 89 DEGREES
VENTRICULAR RATE- MUSE: 75 BPM
WBC # BLD AUTO: 19.3 10E3/UL (ref 4–11)

## 2022-04-14 PROCEDURE — 80048 BASIC METABOLIC PNL TOTAL CA: CPT | Performed by: STUDENT IN AN ORGANIZED HEALTH CARE EDUCATION/TRAINING PROGRAM

## 2022-04-14 PROCEDURE — 120N000001 HC R&B MED SURG/OB

## 2022-04-14 PROCEDURE — 86140 C-REACTIVE PROTEIN: CPT | Performed by: STUDENT IN AN ORGANIZED HEALTH CARE EDUCATION/TRAINING PROGRAM

## 2022-04-14 PROCEDURE — 250N000011 HC RX IP 250 OP 636: Performed by: STUDENT IN AN ORGANIZED HEALTH CARE EDUCATION/TRAINING PROGRAM

## 2022-04-14 PROCEDURE — 85025 COMPLETE CBC W/AUTO DIFF WBC: CPT | Performed by: STUDENT IN AN ORGANIZED HEALTH CARE EDUCATION/TRAINING PROGRAM

## 2022-04-14 PROCEDURE — 94799 UNLISTED PULMONARY SVC/PX: CPT

## 2022-04-14 PROCEDURE — 250N000013 HC RX MED GY IP 250 OP 250 PS 637: Performed by: FAMILY MEDICINE

## 2022-04-14 PROCEDURE — 250N000009 HC RX 250: Performed by: FAMILY MEDICINE

## 2022-04-14 PROCEDURE — 250N000013 HC RX MED GY IP 250 OP 250 PS 637: Performed by: STUDENT IN AN ORGANIZED HEALTH CARE EDUCATION/TRAINING PROGRAM

## 2022-04-14 PROCEDURE — 250N000012 HC RX MED GY IP 250 OP 636 PS 637

## 2022-04-14 PROCEDURE — 99233 SBSQ HOSP IP/OBS HIGH 50: CPT | Performed by: CLINICAL NURSE SPECIALIST

## 2022-04-14 PROCEDURE — 94640 AIRWAY INHALATION TREATMENT: CPT | Mod: 76

## 2022-04-14 PROCEDURE — 97535 SELF CARE MNGMENT TRAINING: CPT | Mod: GO

## 2022-04-14 PROCEDURE — 93010 ELECTROCARDIOGRAM REPORT: CPT | Mod: HIP | Performed by: INTERNAL MEDICINE

## 2022-04-14 PROCEDURE — 83735 ASSAY OF MAGNESIUM: CPT | Performed by: STUDENT IN AN ORGANIZED HEALTH CARE EDUCATION/TRAINING PROGRAM

## 2022-04-14 PROCEDURE — 250N000013 HC RX MED GY IP 250 OP 250 PS 637

## 2022-04-14 PROCEDURE — 999N000157 HC STATISTIC RCP TIME EA 10 MIN

## 2022-04-14 PROCEDURE — 250N000009 HC RX 250: Performed by: STUDENT IN AN ORGANIZED HEALTH CARE EDUCATION/TRAINING PROGRAM

## 2022-04-14 PROCEDURE — 99233 SBSQ HOSP IP/OBS HIGH 50: CPT | Mod: GC

## 2022-04-14 PROCEDURE — 93005 ELECTROCARDIOGRAM TRACING: CPT

## 2022-04-14 PROCEDURE — 87635 SARS-COV-2 COVID-19 AMP PRB: CPT | Performed by: STUDENT IN AN ORGANIZED HEALTH CARE EDUCATION/TRAINING PROGRAM

## 2022-04-14 PROCEDURE — 84100 ASSAY OF PHOSPHORUS: CPT | Performed by: STUDENT IN AN ORGANIZED HEALTH CARE EDUCATION/TRAINING PROGRAM

## 2022-04-14 PROCEDURE — 272N000270 HC CIRCUIT, METANEB

## 2022-04-14 PROCEDURE — 71045 X-RAY EXAM CHEST 1 VIEW: CPT

## 2022-04-14 RX ORDER — FUROSEMIDE 20 MG
20 TABLET ORAL DAILY
Status: DISCONTINUED | OUTPATIENT
Start: 2022-04-15 | End: 2022-04-22 | Stop reason: HOSPADM

## 2022-04-14 RX ORDER — LEVOFLOXACIN 750 MG/1
750 TABLET, FILM COATED ORAL DAILY
Status: DISCONTINUED | OUTPATIENT
Start: 2022-04-14 | End: 2022-04-14

## 2022-04-14 RX ORDER — LEVOFLOXACIN 750 MG/1
750 TABLET, FILM COATED ORAL
Status: DISCONTINUED | OUTPATIENT
Start: 2022-04-14 | End: 2022-04-15

## 2022-04-14 RX ORDER — SODIUM CHLORIDE FOR INHALATION 3 %
3 VIAL, NEBULIZER (ML) INHALATION
Status: DISCONTINUED | OUTPATIENT
Start: 2022-04-14 | End: 2022-04-22 | Stop reason: HOSPADM

## 2022-04-14 RX ORDER — MAGNESIUM SULFATE HEPTAHYDRATE 40 MG/ML
2 INJECTION, SOLUTION INTRAVENOUS ONCE
Status: COMPLETED | OUTPATIENT
Start: 2022-04-14 | End: 2022-04-14

## 2022-04-14 RX ADMIN — RIVAROXABAN 15 MG: 15 TABLET, FILM COATED ORAL at 21:19

## 2022-04-14 RX ADMIN — VANCOMYCIN HYDROCHLORIDE 125 MG: 125 CAPSULE ORAL at 13:09

## 2022-04-14 RX ADMIN — Medication 5 MG: at 21:19

## 2022-04-14 RX ADMIN — IPRATROPIUM BROMIDE AND ALBUTEROL SULFATE 3 ML: 2.5; .5 SOLUTION RESPIRATORY (INHALATION) at 20:09

## 2022-04-14 RX ADMIN — IPRATROPIUM BROMIDE AND ALBUTEROL SULFATE 3 ML: 2.5; .5 SOLUTION RESPIRATORY (INHALATION) at 07:59

## 2022-04-14 RX ADMIN — SIMVASTATIN 40 MG: 40 TABLET, FILM COATED ORAL at 21:18

## 2022-04-14 RX ADMIN — B-COMPLEX W/ C & FOLIC ACID TAB 1 MG 1 TABLET: 1 TAB at 08:20

## 2022-04-14 RX ADMIN — Medication 1 CAPSULE: at 08:20

## 2022-04-14 RX ADMIN — FUROSEMIDE 40 MG: 40 TABLET ORAL at 08:20

## 2022-04-14 RX ADMIN — LEVOFLOXACIN 750 MG: 750 TABLET, FILM COATED ORAL at 13:09

## 2022-04-14 RX ADMIN — MAGNESIUM SULFATE HEPTAHYDRATE 2 G: 40 INJECTION, SOLUTION INTRAVENOUS at 10:35

## 2022-04-14 RX ADMIN — VANCOMYCIN HYDROCHLORIDE 125 MG: 125 CAPSULE ORAL at 08:20

## 2022-04-14 RX ADMIN — SODIUM CHLORIDE SOLN NEBU 3% 3 ML: 3 NEBU SOLN at 07:59

## 2022-04-14 RX ADMIN — GABAPENTIN 200 MG: 100 CAPSULE ORAL at 08:19

## 2022-04-14 RX ADMIN — TRAMADOL HYDROCHLORIDE 50 MG: 50 TABLET, COATED ORAL at 08:19

## 2022-04-14 RX ADMIN — GUAIFENESIN 10 ML: 100 SOLUTION ORAL at 05:37

## 2022-04-14 RX ADMIN — GABAPENTIN 200 MG: 100 CAPSULE ORAL at 21:17

## 2022-04-14 RX ADMIN — TAMOXIFEN CITRATE 20 MG: 10 TABLET ORAL at 21:21

## 2022-04-14 RX ADMIN — VANCOMYCIN HYDROCHLORIDE 125 MG: 125 CAPSULE ORAL at 17:20

## 2022-04-14 RX ADMIN — GUAIFENESIN 1200 MG: 600 TABLET ORAL at 08:19

## 2022-04-14 RX ADMIN — IPRATROPIUM BROMIDE AND ALBUTEROL SULFATE 3 ML: 2.5; .5 SOLUTION RESPIRATORY (INHALATION) at 11:16

## 2022-04-14 RX ADMIN — SODIUM CHLORIDE SOLN NEBU 3% 3 ML: 3 NEBU SOLN at 20:09

## 2022-04-14 RX ADMIN — METOPROLOL SUCCINATE 75 MG: 50 TABLET, EXTENDED RELEASE ORAL at 21:18

## 2022-04-14 RX ADMIN — TRAMADOL HYDROCHLORIDE 50 MG: 50 TABLET, COATED ORAL at 21:19

## 2022-04-14 RX ADMIN — CALCIUM 500 MG: 500 TABLET ORAL at 08:20

## 2022-04-14 RX ADMIN — IPRATROPIUM BROMIDE AND ALBUTEROL SULFATE 3 ML: 2.5; .5 SOLUTION RESPIRATORY (INHALATION) at 15:58

## 2022-04-14 RX ADMIN — FAMOTIDINE 20 MG: 20 TABLET ORAL at 21:19

## 2022-04-14 RX ADMIN — VANCOMYCIN HYDROCHLORIDE 125 MG: 125 CAPSULE ORAL at 21:27

## 2022-04-14 RX ADMIN — Medication 1 CAPSULE: at 21:19

## 2022-04-14 RX ADMIN — GUAIFENESIN 1200 MG: 600 TABLET ORAL at 21:18

## 2022-04-14 RX ADMIN — ACETAMINOPHEN 1000 MG: 500 TABLET, FILM COATED ORAL at 22:57

## 2022-04-14 RX ADMIN — FLUTICASONE PROPIONATE 1 SPRAY: 50 SPRAY, METERED NASAL at 08:31

## 2022-04-14 RX ADMIN — FLUTICASONE FUROATE AND VILANTEROL TRIFENATATE 1 PUFF: 200; 25 POWDER RESPIRATORY (INHALATION) at 21:19

## 2022-04-14 RX ADMIN — PREDNISONE 30 MG: 20 TABLET ORAL at 08:20

## 2022-04-14 ASSESSMENT — ACTIVITIES OF DAILY LIVING (ADL)
ADLS_ACUITY_SCORE: 19
ADLS_ACUITY_SCORE: 21
ADLS_ACUITY_SCORE: 21
ADLS_ACUITY_SCORE: 15
ADLS_ACUITY_SCORE: 21
ADLS_ACUITY_SCORE: 15
ADLS_ACUITY_SCORE: 19
ADLS_ACUITY_SCORE: 15
ADLS_ACUITY_SCORE: 15
ADLS_ACUITY_SCORE: 19
ADLS_ACUITY_SCORE: 19
ADLS_ACUITY_SCORE: 21
ADLS_ACUITY_SCORE: 19
ADLS_ACUITY_SCORE: 19
ADLS_ACUITY_SCORE: 15
ADLS_ACUITY_SCORE: 21
ADLS_ACUITY_SCORE: 21
ADLS_ACUITY_SCORE: 19
ADLS_ACUITY_SCORE: 21
ADLS_ACUITY_SCORE: 19
ADLS_ACUITY_SCORE: 21
ADLS_ACUITY_SCORE: 15
ADLS_ACUITY_SCORE: 21
ADLS_ACUITY_SCORE: 15

## 2022-04-14 NOTE — PLAN OF CARE
"  Problem: COPD (Chronic Obstructive Pulmonary Disease) Comorbidity  Goal: Maintenance of COPD Symptom Control  Outcome: Ongoing, Progressing   Goal Outcome Evaluation:             /62 (BP Location: Left arm)   Pulse 82   Temp 97.7  F (36.5  C) (Oral)   Resp 16   Ht 1.499 m (4' 11\")   Wt 54.6 kg (120 lb 6.4 oz)   SpO2 93%   BMI 24.32 kg/m            "

## 2022-04-14 NOTE — PLAN OF CARE
Goal Outcome Evaluation:    Plan of Care Reviewed With: patient        Outcome Evaluation: On tele - NSR with BBB, PVCs, and prolonged Qt. On 3L O2 via NC. On K, Mag, phos protocols with recheck in AM.

## 2022-04-14 NOTE — PROGRESS NOTES
[facility]  Palliative Care Daily Progress Note      Code status: No CPR- Do NOT Intubate     Impressions, Recommendations & Counseling     SYMPTOM ASSESSMENT:  1.  Shortness of breath secondary to advanced COPD  -Appreciate pulmonary and HMS service recommendations and management  -Oxygen as at home - 3 L nasal cannula  -Continue with prednisone taper, nebulizers, inhalers, doxycycline, Augmentin and Mucinex as ordered     2.  Generalized weakness and fatigue secondary to advanced COPD and activity intolerance.  -At baseline, patient limited in mobility in her home.  Difficult for her to get out and about even to doctor's appointments.     3.  Chronic pain syndrome with chronic back pain from T12 compression fracture  3/9/2021  and QTc 516 on EKG.  - Patient sees orthopedics for injections. Epidural injection 1/20/2022.  - Gabapentin 200  mg by mouth twice daily (normally on 600 mg po BID).  - ContinueTramadol 50 mg by mouth twice daily as needed for pain.   - Continue Salonpas and Voltaren.     4. Anorexia and weight loss (30 pound weight loss since 3/2021) - weight stable  - Continue to monitor.   - Patient watching diet and intake. Eating twice a day.     ADVANCED CARE PLANNING/GOALS OF CARE DISCUSSION  -CODE STATUS: DNR/DNI.  Patient acknowledges that resuscitation and intubation would be burdensome.    - Hopeful for recovery.  Goals are otherwise life-prolonging  - POLST document completed.  -Filled out honoring choices document.  Still needs to be notarized.    Notary is not been present for the last several days.  -Patient should follow-up in outpatient palliative care clinic for ongoing goals of care discussion, symptom management and patient and family support. Referral sent.    Palliative care will sign off at this time.  Symptoms controlled and goals are established.  Please call [phone] with questions or needs.      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  Patient was admitted to [facility] on 4/7/2022 with complaints of chest tightness and shortness of breath.  Currently being treated for COPD exacerbation and possible pneumonia based on chest x-ray and VQ scan and elevated white count.  Patient then found to be positive for C. Difficile.     Today, the patient was seen for:  Shortness of breath and goals of care discussion    Prognosis, Goals, or Advance Care Planning was addressed today with: No.  Mood, coping, and/or meaning in the context of serious illness were addressed today: Yes.  Summary/Comments: Stephanie states she is feeling, \"down\" today. Reports her breathing feels worse. She did not want to talk to her son, Yovanny, and requested that palliative care update him outside of the room. Support provided.     Spoke with patient's children, Leelee and Yovanny, each separately today via telephone to update them on patient condition and answer questions. Support provided to them. Educated Yovanny on fragility of patient's end-stage chronic lung disease.  He hopes to get back with his 2-year-old twins for Mother's Day to see patient and spend some good quality time.    Both kids expressed concerns about patient's compliance with medication regimen and ongoing physical decline and weight loss.  Discussed medical cannabis as an option for appetite stimulation; however, there are concerns about risk of falls with potential side effects.              Interval History:     Per patient or family/caregivers today:  Stephanie states that her breathing feels worse. She feels increasingly short of " "breath at rest and with activity. She does state that she has been able to move around a little more, getting from the chair to bed to bathroom, however, this, \"takes a toll.\" The nebulizers have been helpful in helping her recover from exertion. Does state that her diarrhea has improved, but overall feels fatigued and short of breath. Daughter, Leelee and son, Yovanny both updated over the phone today.     Key Palliative Symptoms:  # Pain severity the last 12 hours: none  # Dyspnea severity the last 12 hours: moderate  # Nausea severity the last 12 hours: none  # Anxiety severity the last 12 hours: moderate             Review of Systems:     Besides above, an additional system ROS was reviewed and is unremarkable          Medications:     I have reviewed this patient's medication profile and medications during this hospitalization.    Noted meds: MAR reviewed.            Physical Exam:   Temp:  [96.6  F (35.9  C)-97.9  F (36.6  C)] 97.7  F (36.5  C)  Pulse:  [74-83] 74  Resp:  [16-24] 16  BP: (122-142)/(58-68) 142/67  SpO2:  [93 %-96 %] 93 %    /55 (BP Location: Right arm)   Pulse 79   Temp 97.2  F (36.2  C) (Oral)   Resp 20   Ht 1.499 m (4' 11\")   Wt 54.6 kg (120 lb 6.4 oz)   SpO2 93%   BMI 24.32 kg/m    General appearance: alert, appears stated age, cooperative and fatigued  Head: Normocephalic, without obvious abnormality, atraumatic  Eyes: normal lids and lashes  Lungs: crackles in bilateral bases, congested cough  Heart: RRR  Skin: scattered bruises on uupper and lower extremities  Neurologic: Grossly normal             Data Reviewed:     Pertinent Labs  Lab Results: personally reviewed.   Lab Results   Component Value Date     04/14/2022    CO2 28 04/14/2022    BUN 25 04/14/2022     Lab Results   Component Value Date    WBC 19.3 04/14/2022    HGB 10.3 04/14/2022    HCT 30.6 04/14/2022    MCV 83 04/14/2022     04/14/2022     AST   Date Value Ref Range Status   07/29/2021 23 0 - 40 U/L " Final     ALT   Date Value Ref Range Status   2021 12 0 - 45 U/L Final     Alkaline Phosphatase   Date Value Ref Range Status   2022 51 45 - 120 U/L Final     Albumin   Date Value Ref Range Status   2021 3.9 3.5 - 5.0 g/dL Final         Radiology Results  NM Lung Scan Perfusion Particulate    Result Date: 2022  EXAM: NM LUNG SCAN PERFUSION PARTICULATE LOCATION: [facility] DATE/TIME: 2022 5:18 PM INDICATION: PE suspected, high prob COMPARISON: Chest x-ray 2022, V/Q scan 2020, CT chest exam 2019 TECHNIQUE: 7.9 mCi technetium-99m MAA, IV. Standard lung perfusion imaging. FINDINGS: Heterogeneous perfusion throughout the left lung with numerous small subsegmental perfusion defects similar to the previous VQ scan. Perfusion to the right lung is more homogeneous, with a few subtle areas of decreased activity also similar to the prior exam. Chest x-ray from today demonstrates emphysema with diffuse opacities in the left upper and mid left lung suggestive of pneumonia.     IMPRESSION: 1.  Chronic perfusion abnormalities throughout both lungs similar to the previous exam. No new perfusion abnormalities. Recent chest x-ray is suggestive of left mid and upper lung pneumonia.    Echocardiogram Complete    Result Date: 2022  [id number]^TEREZA^GERALD^[place] [address]  Name: JUDY ZARCO MRN: [id number] : 1945 Study Date: 2022 02:58 PM Age: 77 yrs Gender: Female Patient Location: [facility] Reason For Study: Syncope Ordering Physician: GERALD STARKS Performed By: SUGEY  BSA: 1.4 m2 Height: 59 in Weight: 112 lb ______________________________________________________________________________ Procedure Complete Portable Echo Adult. ______________________________________________________________________________ Interpretation Summary  1. The left ventricle is normal in size. Left  ventricular systolic performance is moderately reduced. The ejection fraction is estimated to be 35-40%. 2. There is moderate global reduction in left ventricular systolic performance. 3. There is abnormal septal motion possibly related to altered electrical activation due to bundle branch block. 4. There is mild-moderate, to perhaps moderate, tricuspid insufficiency. 5. Normal right ventricular size with mildly reduced right ventricular systolic performance. 6. There is mild right atrial enlargement. 7. Right ventricular systolic pressure relative to right atrial pressure is mildly increased. The pulmonary artery pressure is estimated to be 45-50 mmHg plus right atrial pressure (the IVC is mildly dilated).  When compared to the prior real-time echocardiogram dated 28 July 2021, there has been a mild further diminution in left ventricular systolic performance (it is this reader's impression that left ventricular systolic performance was mild-moderately reduced with an ejection fraction 40-45% on the prior examination). ______________________________________________________________________________ Left ventricle: The left ventricle is normal in size. Left ventricular systolic performance is moderately reduced. The ejection fraction is estimated to be 35-40%. There is moderate global reduction in left ventricular systolic performance. There is abnormal septal motion possibly related to altered electrical activation due to bundle branch block. Left ventricular wall thickness is normal. 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  Assessment of left atrial pressure (LAP): The cumulative findings are indeterminate in evaluating left atrial pressure.  Right ventricle: Normal right ventricular size with mildly reduced right ventricular systolic performance.  Left atrium: There is borderline left atrial enlargement.  Right atrium: There is mild right atrial enlargement.  IVC: The IVC is mildly  dilated.  Aortic valve: The aortic valve is comprised of three cusps.  Mitral valve: There is mild mitral annular calcification. There is trace mitral insufficiency.  Tricuspid valve: The tricuspid valve is grossly morphologically normal. There is mild-moderate, to perhaps moderate, tricuspid insufficiency.  Pulmonic valve: The pulmonic valve is grossly morphologically normal.  Thoracic aorta: The aortic root and proximal ascending aorta are of normal dimension.  Pericardium: There is no significant pericardial effusion. ______________________________________________________________________________ ______________________________________________________________________________ MMode/2D Measurements & Calculations RVDd: 3.5 cm IVSd: 0.98 cm LVIDd: 4.1 cm LVIDs: 3.3 cm LVPWd: 0.90 cm FS: 19.4 % LV mass(C)d: 122.9 grams LV mass(C)dI: 85.3 grams/m2 Ao root diam: 2.8 cm LA dimension: 3.5 cm asc Aorta Diam: 2.8 cm LA/Ao: 1.2 LVOT diam: 1.8 cm LVOT area: 2.6 cm2  LA Volume Indexed (AL/bp): 28.8 ml/m2 RWT: 0.44  Time Measurements MM HR: 98.0 BPM  Doppler Measurements & Calculations MV E max carolyn: 89.4 cm/sec MV A max carolyn: 135.9 cm/sec MV E/A: 0.66 MV dec time: 0.12 sec LV V1 max PG: 3.2 mmHg LV V1 max: 89.2 cm/sec LV V1 VTI: 18.3 cm SV(LVOT): 48.2 ml SI(LVOT): 33.4 ml/m2 TR max carolyn: 348.4 cm/sec TR max P.6 mmHg E/E' av.9 Lateral E/e': 11.0 Medial E/e': 16.8  ______________________________________________________________________________ Report approved by: Deepak Anderson 2022 04:24 PM       US Lower Extremity Venous Duplex Bilateral    Result Date: 2022  EXAM: US LOWER EXTREMITY VENOUS DUPLEX BILATERAL LOCATION: [facility] DATE/TIME: 2022 3:49 PM INDICATION: hypoxia, hypotension; leg swelling COMPARISON: None. TECHNIQUE: Venous Duplex ultrasound of bilateral lower extremities with and without compression, augmentation and duplex. Color flow and spectral Doppler with  waveform analysis performed. FINDINGS: Exam includes the common femoral, femoral, popliteal veins as well as segmentally visualized deep calf veins and greater saphenous vein. RIGHT: No deep vein thrombosis. No superficial thrombophlebitis. No popliteal cyst. LEFT: No deep vein thrombosis. No superficial thrombophlebitis. No popliteal cyst.     IMPRESSION: 1.  No deep venous thrombosis in the bilateral lower extremities.    XR Chest Port 1 View    Result Date: 4/10/2022  EXAM: XR CHEST PORTABLE 1 VIEW LOCATION: [facility] DATE/TIME: 04/10/2022, 7:56 AM INDICATION: Shortness of breath. COMPARISON: CT of the chest, abdomen, and pelvis performed 04/08/2022. Chest radiograph performed 04/07/2022.     IMPRESSION: Ill-defined consolidation and infiltrate in the left upper lobe is again noted. Mild hyperinflation both lungs. The right lung is otherwise clear. Tortuous calcified thoracic aorta. Heart size has increased in prominence compared to 04/07/2022. Pulmonary vascularity is within normal limits where seen. Old right rib fractures. Right PICC line has been removed.     XR Chest Port 1 View    Result Date: 4/7/2022  EXAM: XR CHEST PORT 1 VIEW LOCATION: [facility] DATE/TIME: 4/7/2022 4:49 PM INDICATION: Cough. COMPARISON: 06/02/2021.     IMPRESSION: New moderate opacity in the left mid to upper lung suggestive of pneumonia given history of cough. However, this requires follow-up to complete resolution to exclude an underlying lesion (6-8 week follow-up radiographs). Right PICC tip near the cavoatrial junction. Normal heart size. Atherosclerosis. No significant pleural effusion.     CT Chest Abdomen Pelvis w/o Contrast    Result Date: 4/8/2022  EXAM: CT CHEST ABDOMEN PELVIS W/O CONTRAST LOCATION: [facility] DATE/TIME: 4/8/2022 2:17 PM INDICATION: History of breast cancer. Pneumonia. Weight loss. COMPARISON: CT chest 6/12/2020, CT  chest/abdomen/pelvis 6/11/2019 TECHNIQUE: CT scan of the chest, abdomen, and pelvis was performed without IV contrast. Multiplanar reformats were obtained. Dose reduction techniques were used. CONTRAST: None. FINDINGS: LUNGS AND PLEURA: Moderate irregular consolidation in the posterior left upper lobe. Confluent emphysema. Mild bibasilar atelectasis/scar. Trace pleural effusions. Stable mild diffuse bronchial wall thickening. A few stable benign pulmonary nodules. MEDIASTINUM/AXILLAE: Right PICC. Mediastinal lymphadenopathy with the largest largest being a right paratracheal node measuring 1.6 cm in short axis, image 48:3. Small pericardial effusion. The main pulmonary artery is enlarged at 3.0 cm which may be seen with pulmonary artery hypertension. CORONARY ARTERY CALCIFICATION: Moderate. HEPATOBILIARY: Normal. PANCREAS: Normal. SPLEEN: Normal. ADRENAL GLANDS: Normal. KIDNEYS/BLADDER: Low-lying right kidney. Subcentimeter renal hypodensities which are too small to characterize. No hydronephrosis. BOWEL: No obstruction or inflammatory change. Normal appendix. LYMPH NODES: Normal. VASCULATURE: Stable ectasia of the abdominal aorta measuring 2.9 x 2.3 cm, image 169:3. Moderate atherosclerosis. PELVIC ORGANS: Normal. MUSCULOSKELETAL: Left hip arthroplasty. Osseous demineralization. Degenerative changes of the spine. Remote left rib fractures. T12 compression fracture with 80% loss of height which is likely remote.     IMPRESSION: 1.  Moderate left upper lobe consolidation. This is consistent with pneumonia. Recommend a follow-up chest radiograph in 4-6 weeks to document resolution. 2.  Mediastinal lymphadenopathy. 3.  No acute abnormality in the abdomen or pelvis.       TTS: I have personally spent a total of 35 minutes today reviewing patient's medical record, consultation with the medical providers, assessing patient and symptoms and providing emotional support with more than 50% of this time spent discussing goals  of care with patient's children via telephone and updating them on patient condition.    LEYLA Walker, APRN, CNS  Palliative Care  [phone]

## 2022-04-14 NOTE — PROGRESS NOTES
[facility]    Progress Note - Hospitalist Service       Date of Admission:  4/7/2022    Assessment & Plan    77-year-old female with PMH significant for HFrEF, HTN, factor V Leiden on Xarelto, CKD, osteoporosis, and 35+ pound unintentional weight loss in the past 1 year who was admitted on 4/7/2022 with shortness of breath and was found to have radiographic evidence consistent with pneumonia, in the setting of severe COPD, noted to be c-diff positive during admission. Transitioned to oral antibiotics on 4/12, now with worsening pulmonary status, mild increase in oxygen needs, continuing to feel very weak.     Acute Hypoxic Respiratory Failure  Community-acquired left mid and upper lobe pneumonia  COPD Exacerbation  Elevated lactate, improved  Leukocytosis, mildly improving  Admitted with left upper and mid lobe pneumonia with COPD exacerbation. Has had frequent outpatient antibiotics and steroid tapers. Was recommended to use oxygen 2.5 L at home, but only uses occasional. OP pulmonologist at [facility]. CXR 4/10 showed continued pneumonia, and WBC, CRP consistently elevated. WBC and CRP improved after treatment for c-diff initiated. Transitioned from IV Zosyn and Doxycycline to oral Doxycyline and Augmentin on 4/12, now having worsening SOB and coarse breath sounds. Doxycycline and Augmentin 7 day coarse completed 4/14. Now worsening, concern for pseudomonas needing to be covered.   - Pulmonology consulted, appreciate recs, signed off 4/12  - Palliative consulted, appreciate recs   - Start Levaquin 750 mg daily x 7 days  - Repeat CXR today->no real improvement  - Consider ID referral if not improving   - Sputum culture  - Chest physiotherapy  - Prednisone taper 30 mg, 20 mg, 10 mg, 5 mg for 3 days each  - Continuous pulse oximetry  - Supplemental O2 by NC, keep SPO2 88% to 92%. Avoid over oxygenation  - Scheduled Duonebs Q4hrs with Mucomyst  - PRN Albuterol Nebs Q2Hrs  - Continue Trelegy  Ellipta 1 puff at bedtime   - BMP, CBC, CRP  - Repeat imaging recommended in 4-6 weeks for resolution    C-diff Positive  Diarrhea  Patient began having loose stools during admission. C-diff+ 4/11/22. Still weak from frequent stools.   - Vanco  mg QID x 10 days (4/12-)  - Start probiotic BID    Goals of Care  DNR/DNI  Overall, poor long term prognosis. Has severe COPD with frequent recurrent infections. Patient expressed not wanting to return to hospital. Daughter, Leelee, is helping with her healthcare management.   - Palliative care consulted, appreciate recs    HFrEF   HTN  PTA took furosemide 80 mg total daily.  Received IV Lasix 20 mg 4/10.  Echo showed reduced EF from prior.  Hyperinflated lungs without radiographic evidence to suggest heart failure exacerbation on CXR 4/10.  Could consider stress test prior to discharge pending clinical improvement.  - Continue PTA Lasix 40 mg daily  - Continue PTA Metoprolol to 75 mg XL daily   - Daily weights  - Strict I/O, pure wick if patient tolerates    Delirium, Resolved  Agitation  Gabapentin was decreased to 200 mg twice daily.  Vesicare and Myrbetriq were discontinued.  Patient's room was changed to be closer to nursing station.  No indication for one-to-one at this time.  Reevaluate daily.  - Delirium precautions  - Bundle cares  - Continue melatonin 5 mg at bedtime  - Open blinds, turn on lights during the day    Weight loss  Multifactorial in the setting of COPD, decreased oral intake  -RD consulted, appreciate recommendations and assistance    Afib w/ RVR on Xarelto  Tachycardia  Prolonged QTC  Hx TIA, Factor V Leiden  Metoprolol was initially on hold for hypotension, now resumed. Did have episode of Afib w RVR to 160's day 1 after Metoprolol was held for hypotension. Telemetry showing sinus tachycardia with HR low 80s, BBB, PAC, PVCs.  - Continuous telemetry  - Avoid QT prolonging medications  -Trend and replete electrolytes as needed   - Continue PTA  Metoprolol  - Continue Xarelto as at home  - EKG to check QT     ANATOLIY on CKD, Improved  Admission Cr 2.44 BUN 36 with a variable baseline 1.5-2.0.  Creatinine has been downtrending with IV hydration.    - Daily BMP  - Avoid nephrotoxins    Overactive bladder  Discontinued PTA Mirabegron 50 mg and Solifenacin 5 mg in the setting of acute delirium.  -Pure wick if patient tolerates     Normocytic normochromic anemia, stable  Admission Hgb of 10.9.  Hgbs 13.3 - 9.8 in the 2 months PTA. No ssx GI bleed.  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  - Cont. PTA Tamoxifen 20 daily  Osteoporosis  - Hold PTA VitD and Ca supplementation  Allergies  - Hold PTA Cetirizine and Flonase  GERD  - Cont. PTA 20 mg pepcid at bedtime  Chronic Pain secondary to T12 compression fracture  - Continue gabapentin 200 mg BID (dose decreased this admission)  - Continue Tramadol 50 mg Q12h as at home     Diet: Snacks/Supplements Adult: Ensure Enlive; With Meals  Regular Diet Adult    DVT Prophylaxis: Xarelto  Hayes Catheter: Not present  Fluids: PO  Central Lines: PRESENT  PICC Double Lumen 04/11/22 Right Brachial vein medial-Site Assessment: WDL  Cardiac Monitoring: ACTIVE order. Indication: QTc prolonging medication (48 hours)  Code Status: No CPR- Do NOT Intubate      Disposition Plan   Expected Discharge: 04/14/2022     Anticipated discharge location: home with family;home with help/services       The patient's care was discussed with the Attending Physician, Dr. Janae Perez.    I spoke with Leelee, the patient's daughter, on the phone.     Irene Lombardo MD   [facility]  [facility]      ______________________________________________________________________    Interval History    She feels much worse today. Harsh cough  with productive sputum. Stools have slowed down.     Physical Exam   Vital Signs: Temp: 97.7  F (36.5  C) Temp src: Oral BP: (!) 142/67 Pulse: 74   Resp: 16 SpO2: 93 % O2 Device: Nasal cannula Oxygen Delivery: 3 LPM  Weight: 120 lbs 6.4 oz  Physical Exam   Constitutional: Awake, alert, cooperative, oriented to person, place, and day. Appears uncomfortable   Eyes: Lids and lashes normal, extra ocular muscles intact, sclera clear, conjunctiva normal.  ENT: Normocephalic, without obvious abnormality, atraumatic  Lungs:  LS coarse on left, harsh cough, no wheezing, poor air flow on right  Cardiovascular: Regular rate and rhythm, normal S1 and S2, +JOHN PAUL  Abdomen: Soft, non tender, no distention or guarding  Musculoskeletal: Able to ambulate  Neurologic: Awake and alert. Cranial nerves II-XII are grossly intact.  Neuropsychiatric: Normal affect, mood, orientation, memory seems poor  Skin: Very fragile, bruising      Data   Recent Labs   Lab 04/14/22  0528 04/13/22  0446 04/12/22  2221 04/12/22  1627 04/12/22  0550   WBC 19.3* 26.3*  --   --  28.8*   HGB 10.3* 10.3*  --   --  8.8*   MCV 83 83  --   --  83    304  --   --  301    143  --   --  145   POTASSIUM 3.5 3.7 3.4*   < > 3.4*   CHLORIDE 105 108*  --   --  111*   CO2 28 26  --   --  21*   BUN 25 26  --   --  26   CR 0.91 1.10  --   --  1.23*   ANIONGAP 9 9  --   --  13   MESSI 7.9* 8.2*  --   --  8.5   GLC 97 83  --   --  85    < > = values in this interval not displayed.     No results found for this or any previous visit (from the past 24 hour(s)).  Medications     - MEDICATION INSTRUCTIONS -         calcium carbonate 500 mg (elemental)  500 mg Oral Daily     famotidine  20 mg Oral At Bedtime     fluticasone  1 spray Both Nostrils Daily     fluticasone-vilanterol  1 puff Inhalation At Bedtime    And     umeclidinium  1 puff Inhalation At Bedtime     furosemide  40 mg Oral Daily     gabapentin  200 mg Oral BID     guaiFENesin  1,200 mg Oral BID      ipratropium - albuterol 0.5 mg/2.5 mg/3 mL  3 mL Nebulization 4x daily     lactobacillus rhamnosus (GG)  1 capsule Oral BID     magnesium sulfate  2 g Intravenous Once     melatonin  5 mg Oral At Bedtime     metoprolol succinate ER  75 mg Oral At Bedtime     multivitamin RENAL  1 tablet Oral Daily     predniSONE  30 mg Oral Daily    Followed by     [START ON 4/16/2022] predniSONE  20 mg Oral Daily    Followed by     [START ON 4/19/2022] predniSONE  10 mg Oral Daily    Followed by     [START ON 4/22/2022] predniSONE  5 mg Oral Daily     rivaroxaban ANTICOAGULANT  15 mg Oral At Bedtime     simvastatin  40 mg Oral At Bedtime     sodium chloride  3 mL Nebulization BID     tamoxifen  20 mg Oral QPM     traMADol  50 mg Oral BID     vancomycin  125 mg Oral 4x Daily

## 2022-04-14 NOTE — PROGRESS NOTES
Patient is on 3L NC with SPO2 around 93%. Patient is coarse and diminished. Nebulizers given as ordered. New XRAY today show pneumonia in left lung. Metaneb started QID with nebulizer treatments. NaCL nebs also changed from BID to QID so help with secretion clearance. RT will continue to follow.    Lm Felix, RT

## 2022-04-14 NOTE — PROVIDER NOTIFICATION
"Provider Notified: \"Pt anxious and having shortness of breath. Available Neb treatment and medication given. Pt also having episode of confusion where she think she have taken all her medications and she think \"she is loosing her mind\" patient redirected and emotional support provided\"    Discussed with [facility] team. Provider at bed side visiting with patient.    Addendum: New order for Aerobic bacterial culture and Chest XR.    Will monitor and continue plan of care.   "

## 2022-04-14 NOTE — PROVIDER NOTIFICATION
04/13/22 2035   Tech Time   $Tech Time (10 minute increments) 3   Nebulizer Assessment & Treatment   $RT Use ONLY Delivery Method Nebulizer - Additional   Nebulizer Device Mask   Pretreatment Heart Rate (beats/min) 82   Pretreatment Resp Rate (breaths/min) 16   Pretreatment O2 sats - (TCU only) 97   Pretreat Breath Sounds - Bilat - All Lobes clear;diminished   Breath Sounds Post-Respiratory Treatment   Posttreatment Heart Rate (beats/min) 79   Posttreatment Resp Rate (breaths/min) 18   Post treatment O2 Sats - (TCU only) 97   Posttreatment Assessment (SVN) breath sounds unchanged   Signs of Intolerance (SVN) none   Resp Acapella   Acapella Done   Repetitions (indicate number of repetitions) 5   Resistance Level (indicate level) 2

## 2022-04-14 NOTE — CONSULTS
"ACUPUNCTURIST TREATMENT NOTE    Name: Irene León  :  1945  MRN:  [id number]    Acupuncture Treatment  Patient Type: Medical  Intervention Reason: Desires Experience  Pre-session Other ratin  Post-session Other rating: asleep  Patient complaint:: shortness of breath  Initial insertions: Du 20, Ken 17, Jana 1, Jana 7, LI 4, St 36, Ki 6         \"Risks and benefits of acupuncture were discussed with patient. Consent for treatment was given. We thank you for the referral.\"     Kendy Pritchard L.Ac.     Date:  2022  Time:  2:13 PM    "

## 2022-04-14 NOTE — PLAN OF CARE
Goal Outcome Evaluation:      Labs improving. Denies pain. Cough medicine used for congested cough. Possible discharge home this week.

## 2022-04-15 ENCOUNTER — APPOINTMENT (OUTPATIENT)
Dept: PHYSICAL THERAPY | Facility: CLINIC | Age: 77
DRG: 177 | End: 2022-04-15
Payer: MEDICARE

## 2022-04-15 ENCOUNTER — APPOINTMENT (OUTPATIENT)
Dept: OCCUPATIONAL THERAPY | Facility: CLINIC | Age: 77
DRG: 177 | End: 2022-04-15
Payer: MEDICARE

## 2022-04-15 ENCOUNTER — APPOINTMENT (OUTPATIENT)
Dept: CT IMAGING | Facility: CLINIC | Age: 77
DRG: 177 | End: 2022-04-15
Payer: MEDICARE

## 2022-04-15 PROBLEM — J85.1: Status: ACTIVE | Noted: 2022-04-15

## 2022-04-15 PROBLEM — D72.828 NEUTROPHILIA: Status: ACTIVE | Noted: 2022-04-15

## 2022-04-15 LAB
ANION GAP SERPL CALCULATED.3IONS-SCNC: 9 MMOL/L (ref 5–18)
BACTERIA BLD CULT: NO GROWTH
BACTERIA BLD CULT: NO GROWTH
BASOPHILS # BLD AUTO: 0.1 10E3/UL (ref 0–0.2)
BASOPHILS NFR BLD AUTO: 0 %
BUN SERPL-MCNC: 29 MG/DL (ref 8–28)
C REACTIVE PROTEIN LHE: 8.9 MG/DL (ref 0–0.8)
CALCIUM SERPL-MCNC: 8.3 MG/DL (ref 8.5–10.5)
CHLORIDE BLD-SCNC: 102 MMOL/L (ref 98–107)
CO2 SERPL-SCNC: 30 MMOL/L (ref 22–31)
CREAT SERPL-MCNC: 0.95 MG/DL (ref 0.6–1.1)
EOSINOPHIL # BLD AUTO: 0.1 10E3/UL (ref 0–0.7)
EOSINOPHIL NFR BLD AUTO: 0 %
ERYTHROCYTE [DISTWIDTH] IN BLOOD BY AUTOMATED COUNT: 14.6 % (ref 10–15)
GFR SERPL CREATININE-BSD FRML MDRD: 61 ML/MIN/1.73M2
GLUCOSE BLD-MCNC: 104 MG/DL (ref 70–125)
HCT VFR BLD AUTO: 34 % (ref 35–47)
HGB BLD-MCNC: 11.4 G/DL (ref 11.7–15.7)
IMM GRANULOCYTES # BLD: 0.4 10E3/UL
IMM GRANULOCYTES NFR BLD: 2 %
LACTATE SERPL-SCNC: 1.2 MMOL/L (ref 0.7–2)
LYMPHOCYTES # BLD AUTO: 1.4 10E3/UL (ref 0.8–5.3)
LYMPHOCYTES NFR BLD AUTO: 5 %
MAGNESIUM SERPL-MCNC: 2 MG/DL (ref 1.8–2.6)
MCH RBC QN AUTO: 28.4 PG (ref 26.5–33)
MCHC RBC AUTO-ENTMCNC: 33.5 G/DL (ref 31.5–36.5)
MCV RBC AUTO: 85 FL (ref 78–100)
MONOCYTES # BLD AUTO: 1.7 10E3/UL (ref 0–1.3)
MONOCYTES NFR BLD AUTO: 7 %
MRSA DNA SPEC QL NAA+PROBE: NEGATIVE
NEUTROPHILS # BLD AUTO: 22.2 10E3/UL (ref 1.6–8.3)
NEUTROPHILS NFR BLD AUTO: 86 %
NRBC # BLD AUTO: 0 10E3/UL
NRBC BLD AUTO-RTO: 0 /100
PHOSPHATE SERPL-MCNC: 3.3 MG/DL (ref 2.5–4.5)
PLATELET # BLD AUTO: 331 10E3/UL (ref 150–450)
POTASSIUM BLD-SCNC: 3.4 MMOL/L (ref 3.5–5)
POTASSIUM BLD-SCNC: 4.6 MMOL/L (ref 3.5–5)
RBC # BLD AUTO: 4.02 10E6/UL (ref 3.8–5.2)
SA TARGET DNA: NEGATIVE
SODIUM SERPL-SCNC: 141 MMOL/L (ref 136–145)
WBC # BLD AUTO: 25.8 10E3/UL (ref 4–11)

## 2022-04-15 PROCEDURE — 97110 THERAPEUTIC EXERCISES: CPT | Mod: GP

## 2022-04-15 PROCEDURE — 86140 C-REACTIVE PROTEIN: CPT | Performed by: STUDENT IN AN ORGANIZED HEALTH CARE EDUCATION/TRAINING PROGRAM

## 2022-04-15 PROCEDURE — 999N000054 HC STATISTIC EKG NON-CHARGEABLE

## 2022-04-15 PROCEDURE — 250N000013 HC RX MED GY IP 250 OP 250 PS 637: Performed by: STUDENT IN AN ORGANIZED HEALTH CARE EDUCATION/TRAINING PROGRAM

## 2022-04-15 PROCEDURE — 250N000012 HC RX MED GY IP 250 OP 636 PS 637

## 2022-04-15 PROCEDURE — 80048 BASIC METABOLIC PNL TOTAL CA: CPT | Performed by: STUDENT IN AN ORGANIZED HEALTH CARE EDUCATION/TRAINING PROGRAM

## 2022-04-15 PROCEDURE — 84132 ASSAY OF SERUM POTASSIUM: CPT

## 2022-04-15 PROCEDURE — 71250 CT THORAX DX C-: CPT

## 2022-04-15 PROCEDURE — 97110 THERAPEUTIC EXERCISES: CPT | Mod: GO

## 2022-04-15 PROCEDURE — 250N000009 HC RX 250: Performed by: STUDENT IN AN ORGANIZED HEALTH CARE EDUCATION/TRAINING PROGRAM

## 2022-04-15 PROCEDURE — 93010 ELECTROCARDIOGRAM REPORT: CPT | Mod: HIP | Performed by: INTERNAL MEDICINE

## 2022-04-15 PROCEDURE — 93005 ELECTROCARDIOGRAM TRACING: CPT

## 2022-04-15 PROCEDURE — 99233 SBSQ HOSP IP/OBS HIGH 50: CPT | Performed by: INTERNAL MEDICINE

## 2022-04-15 PROCEDURE — 93005 ELECTROCARDIOGRAM TRACING: CPT | Performed by: STUDENT IN AN ORGANIZED HEALTH CARE EDUCATION/TRAINING PROGRAM

## 2022-04-15 PROCEDURE — 999N000157 HC STATISTIC RCP TIME EA 10 MIN

## 2022-04-15 PROCEDURE — 36415 COLL VENOUS BLD VENIPUNCTURE: CPT | Performed by: INTERNAL MEDICINE

## 2022-04-15 PROCEDURE — 94640 AIRWAY INHALATION TREATMENT: CPT | Mod: 76

## 2022-04-15 PROCEDURE — 250N000011 HC RX IP 250 OP 636: Performed by: INTERNAL MEDICINE

## 2022-04-15 PROCEDURE — 250N000009 HC RX 250: Performed by: FAMILY MEDICINE

## 2022-04-15 PROCEDURE — 99233 SBSQ HOSP IP/OBS HIGH 50: CPT | Mod: GC

## 2022-04-15 PROCEDURE — 87641 MR-STAPH DNA AMP PROBE: CPT

## 2022-04-15 PROCEDURE — 97530 THERAPEUTIC ACTIVITIES: CPT | Mod: GP

## 2022-04-15 PROCEDURE — 250N000013 HC RX MED GY IP 250 OP 250 PS 637

## 2022-04-15 PROCEDURE — 94640 AIRWAY INHALATION TREATMENT: CPT

## 2022-04-15 PROCEDURE — 86360 T CELL ABSOLUTE COUNT/RATIO: CPT | Performed by: INTERNAL MEDICINE

## 2022-04-15 PROCEDURE — 250N000013 HC RX MED GY IP 250 OP 250 PS 637: Performed by: FAMILY MEDICINE

## 2022-04-15 PROCEDURE — 83605 ASSAY OF LACTIC ACID: CPT | Performed by: STUDENT IN AN ORGANIZED HEALTH CARE EDUCATION/TRAINING PROGRAM

## 2022-04-15 PROCEDURE — 120N000001 HC R&B MED SURG/OB

## 2022-04-15 PROCEDURE — 86698 HISTOPLASMA ANTIBODY: CPT | Performed by: INTERNAL MEDICINE

## 2022-04-15 PROCEDURE — 84100 ASSAY OF PHOSPHORUS: CPT | Performed by: STUDENT IN AN ORGANIZED HEALTH CARE EDUCATION/TRAINING PROGRAM

## 2022-04-15 PROCEDURE — 83735 ASSAY OF MAGNESIUM: CPT | Performed by: STUDENT IN AN ORGANIZED HEALTH CARE EDUCATION/TRAINING PROGRAM

## 2022-04-15 PROCEDURE — 99223 1ST HOSP IP/OBS HIGH 75: CPT | Performed by: INTERNAL MEDICINE

## 2022-04-15 PROCEDURE — 250N000011 HC RX IP 250 OP 636: Performed by: STUDENT IN AN ORGANIZED HEALTH CARE EDUCATION/TRAINING PROGRAM

## 2022-04-15 PROCEDURE — 250N000011 HC RX IP 250 OP 636: Performed by: FAMILY MEDICINE

## 2022-04-15 PROCEDURE — 85025 COMPLETE CBC W/AUTO DIFF WBC: CPT | Performed by: STUDENT IN AN ORGANIZED HEALTH CARE EDUCATION/TRAINING PROGRAM

## 2022-04-15 RX ORDER — BUSPIRONE HYDROCHLORIDE 5 MG/1
5 TABLET ORAL AT BEDTIME
Status: DISCONTINUED | OUTPATIENT
Start: 2022-04-15 | End: 2022-04-15

## 2022-04-15 RX ORDER — GABAPENTIN 100 MG/1
200 CAPSULE ORAL EVERY MORNING
Status: DISCONTINUED | OUTPATIENT
Start: 2022-04-16 | End: 2022-04-22 | Stop reason: HOSPADM

## 2022-04-15 RX ORDER — PIPERACILLIN SODIUM, TAZOBACTAM SODIUM 3; .375 G/15ML; G/15ML
3.38 INJECTION, POWDER, LYOPHILIZED, FOR SOLUTION INTRAVENOUS EVERY 8 HOURS
Status: DISCONTINUED | OUTPATIENT
Start: 2022-04-15 | End: 2022-04-15

## 2022-04-15 RX ORDER — POTASSIUM CHLORIDE 1500 MG/1
40 TABLET, EXTENDED RELEASE ORAL
Status: COMPLETED | OUTPATIENT
Start: 2022-04-15 | End: 2022-04-15

## 2022-04-15 RX ORDER — POTASSIUM CHLORIDE 1500 MG/1
20 TABLET, EXTENDED RELEASE ORAL ONCE
Status: DISCONTINUED | OUTPATIENT
Start: 2022-04-15 | End: 2022-04-15

## 2022-04-15 RX ORDER — MAGNESIUM SULFATE HEPTAHYDRATE 40 MG/ML
2 INJECTION, SOLUTION INTRAVENOUS ONCE
Status: COMPLETED | OUTPATIENT
Start: 2022-04-15 | End: 2022-04-15

## 2022-04-15 RX ORDER — GABAPENTIN 300 MG/1
300 CAPSULE ORAL AT BEDTIME
Status: DISCONTINUED | OUTPATIENT
Start: 2022-04-15 | End: 2022-04-22 | Stop reason: HOSPADM

## 2022-04-15 RX ORDER — PIPERACILLIN SODIUM, TAZOBACTAM SODIUM 3; .375 G/15ML; G/15ML
3.38 INJECTION, POWDER, LYOPHILIZED, FOR SOLUTION INTRAVENOUS ONCE
Status: COMPLETED | OUTPATIENT
Start: 2022-04-15 | End: 2022-04-15

## 2022-04-15 RX ORDER — AMPICILLIN AND SULBACTAM 2; 1 G/1; G/1
3 INJECTION, POWDER, FOR SOLUTION INTRAMUSCULAR; INTRAVENOUS EVERY 6 HOURS
Status: DISCONTINUED | OUTPATIENT
Start: 2022-04-15 | End: 2022-04-15

## 2022-04-15 RX ORDER — MEROPENEM 1 G/1
1 INJECTION, POWDER, FOR SOLUTION INTRAVENOUS EVERY 8 HOURS
Status: DISCONTINUED | OUTPATIENT
Start: 2022-04-15 | End: 2022-04-16

## 2022-04-15 RX ORDER — LORAZEPAM 0.5 MG/1
0.5 TABLET ORAL ONCE
Status: COMPLETED | OUTPATIENT
Start: 2022-04-15 | End: 2022-04-15

## 2022-04-15 RX ORDER — PIPERACILLIN SODIUM, TAZOBACTAM SODIUM 3; .375 G/15ML; G/15ML
3.38 INJECTION, POWDER, LYOPHILIZED, FOR SOLUTION INTRAVENOUS EVERY 6 HOURS
Status: DISCONTINUED | OUTPATIENT
Start: 2022-04-15 | End: 2022-04-15

## 2022-04-15 RX ORDER — LORAZEPAM 0.5 MG/1
0.5 TABLET ORAL
Status: COMPLETED | OUTPATIENT
Start: 2022-04-15 | End: 2022-04-15

## 2022-04-15 RX ADMIN — IPRATROPIUM BROMIDE AND ALBUTEROL SULFATE 3 ML: 2.5; .5 SOLUTION RESPIRATORY (INHALATION) at 20:18

## 2022-04-15 RX ADMIN — VANCOMYCIN HYDROCHLORIDE 125 MG: 125 CAPSULE ORAL at 20:52

## 2022-04-15 RX ADMIN — TAMOXIFEN CITRATE 20 MG: 10 TABLET ORAL at 20:51

## 2022-04-15 RX ADMIN — POTASSIUM CHLORIDE 40 MEQ: 20 TABLET, EXTENDED RELEASE ORAL at 16:43

## 2022-04-15 RX ADMIN — TRAMADOL HYDROCHLORIDE 50 MG: 50 TABLET, COATED ORAL at 10:23

## 2022-04-15 RX ADMIN — GUAIFENESIN 1200 MG: 600 TABLET ORAL at 20:52

## 2022-04-15 RX ADMIN — SODIUM CHLORIDE SOLN NEBU 3% 3 ML: 3 NEBU SOLN at 20:18

## 2022-04-15 RX ADMIN — GUAIFENESIN 1200 MG: 600 TABLET ORAL at 10:22

## 2022-04-15 RX ADMIN — FUROSEMIDE 20 MG: 20 TABLET ORAL at 10:22

## 2022-04-15 RX ADMIN — ALTEPLASE 2 MG: 2.2 INJECTION, POWDER, LYOPHILIZED, FOR SOLUTION INTRAVENOUS at 07:57

## 2022-04-15 RX ADMIN — Medication 1 CAPSULE: at 20:51

## 2022-04-15 RX ADMIN — SODIUM CHLORIDE SOLN NEBU 3% 3 ML: 3 NEBU SOLN at 16:55

## 2022-04-15 RX ADMIN — SODIUM CHLORIDE SOLN NEBU 3% 3 ML: 3 NEBU SOLN at 14:16

## 2022-04-15 RX ADMIN — POTASSIUM CHLORIDE 40 MEQ: 20 TABLET, EXTENDED RELEASE ORAL at 12:25

## 2022-04-15 RX ADMIN — IPRATROPIUM BROMIDE AND ALBUTEROL SULFATE 3 ML: 2.5; .5 SOLUTION RESPIRATORY (INHALATION) at 16:56

## 2022-04-15 RX ADMIN — LORAZEPAM 0.5 MG: 0.5 TABLET ORAL at 20:52

## 2022-04-15 RX ADMIN — VANCOMYCIN HYDROCHLORIDE 125 MG: 125 CAPSULE ORAL at 16:45

## 2022-04-15 RX ADMIN — LORAZEPAM 0.5 MG: 0.5 TABLET ORAL at 01:04

## 2022-04-15 RX ADMIN — VANCOMYCIN HYDROCHLORIDE 125 MG: 125 CAPSULE ORAL at 12:25

## 2022-04-15 RX ADMIN — SODIUM CHLORIDE SOLN NEBU 3% 3 ML: 3 NEBU SOLN at 07:53

## 2022-04-15 RX ADMIN — IPRATROPIUM BROMIDE AND ALBUTEROL SULFATE 3 ML: 2.5; .5 SOLUTION RESPIRATORY (INHALATION) at 07:53

## 2022-04-15 RX ADMIN — VANCOMYCIN HYDROCHLORIDE 125 MG: 125 CAPSULE ORAL at 10:23

## 2022-04-15 RX ADMIN — GABAPENTIN 300 MG: 300 CAPSULE ORAL at 20:51

## 2022-04-15 RX ADMIN — B-COMPLEX W/ C & FOLIC ACID TAB 1 MG 1 TABLET: 1 TAB at 10:23

## 2022-04-15 RX ADMIN — UMECLIDINIUM 1 PUFF: 62.5 AEROSOL, POWDER ORAL at 22:31

## 2022-04-15 RX ADMIN — TRAMADOL HYDROCHLORIDE 50 MG: 50 TABLET, COATED ORAL at 20:51

## 2022-04-15 RX ADMIN — Medication 5 MG: at 20:52

## 2022-04-15 RX ADMIN — RIVAROXABAN 15 MG: 15 TABLET, FILM COATED ORAL at 20:51

## 2022-04-15 RX ADMIN — MEROPENEM 1 G: 1 INJECTION, POWDER, FOR SOLUTION INTRAVENOUS at 20:51

## 2022-04-15 RX ADMIN — Medication 1 CAPSULE: at 10:23

## 2022-04-15 RX ADMIN — PIPERACILLIN AND TAZOBACTAM 3.38 G: 3; .375 INJECTION, POWDER, LYOPHILIZED, FOR SOLUTION INTRAVENOUS at 16:43

## 2022-04-15 RX ADMIN — METOPROLOL SUCCINATE 75 MG: 50 TABLET, EXTENDED RELEASE ORAL at 20:51

## 2022-04-15 RX ADMIN — GABAPENTIN 200 MG: 100 CAPSULE ORAL at 10:22

## 2022-04-15 RX ADMIN — PREDNISONE 30 MG: 20 TABLET ORAL at 10:23

## 2022-04-15 RX ADMIN — MAGNESIUM SULFATE HEPTAHYDRATE 2 G: 40 INJECTION, SOLUTION INTRAVENOUS at 12:24

## 2022-04-15 RX ADMIN — IPRATROPIUM BROMIDE AND ALBUTEROL SULFATE 3 ML: 2.5; .5 SOLUTION RESPIRATORY (INHALATION) at 14:16

## 2022-04-15 RX ADMIN — FAMOTIDINE 20 MG: 20 TABLET ORAL at 20:51

## 2022-04-15 RX ADMIN — SIMVASTATIN 40 MG: 40 TABLET, FILM COATED ORAL at 20:51

## 2022-04-15 RX ADMIN — FLUTICASONE FUROATE AND VILANTEROL TRIFENATATE 1 PUFF: 200; 25 POWDER RESPIRATORY (INHALATION) at 20:52

## 2022-04-15 ASSESSMENT — ACTIVITIES OF DAILY LIVING (ADL)
ADLS_ACUITY_SCORE: 15
ADLS_ACUITY_SCORE: 11
ADLS_ACUITY_SCORE: 11
ADLS_ACUITY_SCORE: 15
ADLS_ACUITY_SCORE: 11
ADLS_ACUITY_SCORE: 15
ADLS_ACUITY_SCORE: 11
ADLS_ACUITY_SCORE: 15
ADLS_ACUITY_SCORE: 11

## 2022-04-15 NOTE — PROGRESS NOTES
Pt vitally stable, now on 2L with sats in mid 90s. Frequent congested cough, still need to obtain a sputum sample.

## 2022-04-15 NOTE — PROVIDER NOTIFICATION
Paged [facility] regarding 42 beat run of vtach. Will drawn AM labs now to check electrolytes. EKG ordered.

## 2022-04-15 NOTE — CONSULTS
"ACUPUNCTURIST TREATMENT NOTE    Name: Irene León  :  1945  MRN:  [id number]    Acupuncture Treatment  Patient Type: Medical  Intervention Reason: Desires Experience  Pre-session Other ratin  Post-session Other rating: asleep  Patient complaint:: shortness of breath  Initial insertions: LI 4, Jana 7, Ken 17, St 36, Sp 9, Ki 3, Jaki 3, Sp 3         \"Risks and benefits of acupuncture were discussed with patient. Consent for treatment was given. We thank you for the referral.\"     Kendy Pritchard L.Ac.     Date:  4/15/2022  Time:  12:28 PM    "

## 2022-04-15 NOTE — PLAN OF CARE
Problem: Infection  Goal: Absence of Infection Signs and Symptoms  Outcome: Ongoing, Progressing   Goal Outcome Evaluation:                Pt is A&Ox3, d/o to time.On 4 L O2 NC.  Ativan given x1 for anxiety. Denies pain. PICC RUE-not able to get blood return-changed to lab draw.  R PIV SL. Purwick in place. Dyspneic on exertion. T/Rq2h. Still need sputum sample. Discharge is pending.      Update: Lab unable to get blood, swat unable. Order obtained for cathflo-will pass administration on to day shift.

## 2022-04-15 NOTE — PLAN OF CARE
Problem: Plan of Care - These are the overarching goals to be used throughout the patient stay.    Goal: Absence of Hospital-Acquired Illness or Injury  Intervention: Identify and Manage Fall Risk  Recent Flowsheet Documentation  Taken 4/14/2022 1857 by Airam Harris RN  Safety Promotion/Fall Prevention:   patient and family education   nonskid shoes/slippers when out of bed   mobility aid in reach   lighting adjusted   increase visualization of patient   increased rounding and observation   fall prevention program maintained   activity supervised   bed alarm on  Taken 4/14/2022 1309 by Airam Harris RN  Safety Promotion/Fall Prevention:   patient and family education   nonskid shoes/slippers when out of bed   mobility aid in reach   lighting adjusted   increase visualization of patient   increased rounding and observation   fall prevention program maintained   activity supervised   bed alarm on  Intervention: Prevent and Manage VTE (Venous Thromboembolism) Risk  Recent Flowsheet Documentation  Taken 4/14/2022 1857 by Airam Harris RN  VTE Prevention/Management: (on xarelto) --  Taken 4/14/2022 1309 by Airam Harris RN  Activity Management:   up to bedside commode   activity adjusted per tolerance   activity encouraged  Goal: Optimal Comfort and Wellbeing  Outcome: Ongoing, Progressing     Problem: COPD (Chronic Obstructive Pulmonary Disease) Comorbidity  Goal: Maintenance of COPD Symptom Control  Outcome: Ongoing, Progressing  Intervention: Maintain COPD-Symptom Control  Recent Flowsheet Documentation  Taken 4/14/2022 1857 by Airam Harris RN  Medication Review/Management: medications reviewed  Taken 4/14/2022 1309 by Airam Harris RN  Medication Review/Management: medications reviewed     Problem: Heart Failure Comorbidity  Goal: Maintenance of Heart Failure Symptom Control  Intervention: Maintain Heart Failure-Management  Recent Flowsheet Documentation  Taken 4/14/2022  1857 by Airam Harris, RN  Medication Review/Management: medications reviewed  Taken 4/14/2022 1309 by Airam Harris, RN  Medication Review/Management: medications reviewed   Goal Outcome Evaluation:  Pt had a few episodes of air hunger and subsequent anxiety r/t SOB.  Anxiety fluctuating throughout the day.  Pt refused PICC dressing change due to anxiety.  Fixated on having a purewick today when she was in the chair, which is contraindicated.  Encouraged pt to get up to bedside commode or to ambulate to the bathroom, she was resistant because of SOB related anxiety.  Pt's daughter here and brought pt some pizza tonight, it remains at bedside.  Encouraged pt to continue eating/snacking.

## 2022-04-15 NOTE — PROGRESS NOTES
[facility]    Progress Note - Hospitalist Service       Date of Admission:  4/7/2022    Assessment & Plan    77-year-old female with PMH significant for HFrEF, HTN, factor V Leiden on Xarelto, CKD, osteoporosis, and 35+ pound unintentional weight loss in the past 1 year who was admitted on 4/7/2022 with shortness of breath and was found to have radiographic evidence consistent with pneumonia, in the setting of severe COPD, noted to be c-diff positive during admission. Transitioned to oral antibiotics on 4/12, 4/14 worsening pulmonary status. 4/14 started on Levaquin PO. Increased oxygen needs overnight, anxiety, increased WBC today, but CRP improved.     Acute Hypoxic Respiratory Failure  Community-acquired left mid and upper lobe pneumonia  COPD Exacerbation  Elevated lactate, improved  Leukocytosis, worsening  Admitted with left upper and mid lobe pneumonia with COPD exacerbation. Has had frequent outpatient antibiotics and steroid tapers. Was recommended to use oxygen 2.5 L at home, but only uses occasional. OP pulmonologist at [facility]. CXR 4/10 showed continued pneumonia, and WBC, CRP consistently elevated. WBC and CRP improved after treatment for c-diff initiated. Transitioned from IV Zosyn and Doxycycline to oral Doxycyline and Augmentin on 4/12, now having worsening SOB and coarse breath sounds. Doxycycline and Augmentin 7 day coarse completed 4/14. Levaquin started 4/14 for pseudomonas coverage. 4/14 CXR showed no improvement. Clinically looking better, but WBC increased, CRP decreased today. Chest CT showing: new cavity formation involving the left upper lobe pneumonia with a 5 cm cavity now present within the densely consolidated lung. ID consulted, pulmonary re-consulted.   - Pulmonology consulted, appreciate recs, signed off 4/12->Reconsult today  - Consult ID for new cavitation, appreciate recs  - Palliative consulted, appreciate recs, signed off 4/14  - Continue Levaquin  750 mg daily x 7 days (Q48 hour dosing for renal)  - Chest CT to eval pneumonia, questionable cavitation  - Sputum culture needs to be collected   - Chest physiotherapy-she really likes this  - Prednisone taper 30 mg, 20 mg, 10 mg, 5 mg for 3 days each  - Supplemental O2 by NC, keep SPO2 88% to 92%. Avoid over oxygenation  - Scheduled Duonebs Q4hrs with Mucomyst  - PRN Albuterol Nebs Q2Hrs  - Continue Trelegy Ellipta 1 puff at bedtime   - BMP, CBC, CRP  - Repeat imaging recommended in 4-6 weeks for resolution    Anxiety  Panic attack  Has had frequent episodes of anxiety overnight. Felt like she had a panic attack and claustrophobia overnight. Was given Ativan 0.5 mg x 1 and was able to sleep, found it helpful. She received Olanzapine x 1 during a delirium episode early in the admission which was also helpful. She would like to start something for anxiety while in the hospital. Do to prolonged QT with Vtach episode, limited in anti anxiety medication options.   - Buspar 5 mg at bedtime   - Continue integrative therapies     Afib w/ RVR on Xarelto  Tachycardia  Prolonged QTC  Hx TIA, Factor V Leiden  Vtach  Metoprolol was initially on hold for hypotension, now resumed. Did have episode of Afib w RVR to 160's day 1 after Metoprolol was held for hypotension. Had 42 beats of Vtach overnight, asymptomatic.   - Continuous telemetry  - Avoid QT prolonging medications  -Trend and replete electrolytes as needed, K goal >4.0, Mg goal >2.5  - K 40 meq x 2   - Mg 2 G IV once   - Continue PTA Metoprolol  - Continue Xarelto as at home  - EKG to check QT PRN    C-diff Positive  Diarrhea  Patient began having loose stools during admission. C-diff+ 4/11/22. Still weak from frequent stools.   - Vanco  mg QID x 10 days (4/12-)  - Continue probiotic BID    HFrEF   HTN  PTA took furosemide 80 mg total daily.  Received IV Lasix 20 mg 4/10.  Echo showed reduced EF from prior.  Hyperinflated lungs without radiographic evidence to  suggest heart failure exacerbation on CXR 4/10.  Could consider stress test prior to discharge pending clinical improvement.  - Decrease PTA Lasix 40 mg to 20 mg daily to help with frequent urination  - Continue PTA Metoprolol to 75 mg XL daily   - Daily weights  - Strict I/O, pure wick if patient tolerates    Delirium, Resolved  Agitation  Gabapentin was decreased to 200 mg twice daily.  Vesicare and Myrbetriq were discontinued.  Patient's room was changed to be closer to nursing station.  No indication for one-to-one at this time.  Reevaluate daily.  - Delirium precautions  - Bundle cares  - Continue melatonin 5 mg at bedtime  - Open blinds, turn on lights during the day    Weight loss  Multifactorial in the setting of COPD, decreased oral intake  -RD consulted, appreciate recommendations and assistance     ANATOLIY on CKD, Improved  Admission Cr 2.44 BUN 36 with a variable baseline 1.5-2.0.    - Daily BMP  - Avoid nephrotoxins    Overactive bladder  Discontinued PTA Mirabegron 50 mg and Solifenacin 5 mg in the setting of acute delirium.  -Pure wick if patient tolerates    Fragile skin  -Wrap IV, avoid adhesives  -WOC  - Calmoseptine PRN    Goals of Care  DNR/DNI  Overall, poor long term prognosis. Has severe COPD with frequent recurrent infections. Patient expressed not wanting to return to hospital. Daughter, Leelee, is helping with her healthcare management.   - Palliative care consulted, appreciate recs, signed off 4/14    Chronic Conditions and Medications  Breast Cancer  DCIS, ER/CT positive, HER-2 negative, s/p left breast lumpectomy and left axillary sentinel lymph node biopsy.  - Cont. PTA Tamoxifen 20 daily  Osteoporosis  - Hold PTA VitD and Ca supplementation  Allergies  - Hold PTA Cetirizine and Flonase  GERD  - Cont. PTA 20 mg pepcid at bedtime  Normocytic normochromic anemia, stable  Admission Hgb of 10.9.  Hgbs 13.3 - 9.8 in the 2 months PTA. No ssx GI bleed.  -CBC as above  Chronic Pain secondary to T12  "compression fracture  - Continue gabapentin 200 mg BID (dose decreased this admission)  - Continue Tramadol 50 mg Q12h as at home     Diet: Snacks/Supplements Adult: Ensure Enlive; With Meals  Regular Diet Adult    DVT Prophylaxis: Xarelto  Hayes Catheter: Not present  Fluids: PO  Central Lines: PRESENT  PICC Double Lumen 04/11/22 Right Brachial vein medial-Site Assessment: WDL  Cardiac Monitoring: ACTIVE order. Indication: QTc prolonging medication (48 hours)  Code Status: No CPR- Do NOT Intubate      Disposition Plan   Expected Discharge: 2-3 days   Anticipated discharge location: home with family;home with help/services       The patient's care was discussed with the Attending Physician, Dr. Janae Perez.    Irene Lombardo MD   [facility]  [facility]  _______________________________________________________    Interval History    She feels \"a hair better\" today. She had a large loose stool overnight. She is short of breath with any activity. Continues to have harsh cough, trying to cough stuff up.     Physical Exam   Vital Signs: Temp: 97.3  F (36.3  C) Temp src: Axillary BP: (!) 144/68 Pulse: 70   Resp: 16 SpO2: 94 % O2 Device: None (Room air) Oxygen Delivery: 4 LPM  Weight: 120 lbs 6.4 oz  Physical Exam   Constitutional: Awake, alert, cooperative, oriented to person, place, and day. Appears uncomfortable   Eyes: Lids and lashes normal, extra ocular muscles intact, sclera clear, conjunctiva normal.  ENT: Normocephalic, without obvious abnormality, atraumatic  Lungs:  LS coarse on left improved from yesterday, harsh cough, no wheezing, poor air flow on right  Cardiovascular: Regular rate and rhythm, normal S1 and S2, +JOHN PAUL  Abdomen: Soft, non tender, no distention or guarding  Musculoskeletal: Able to ambulate  Neurologic: Awake and alert. Cranial nerves II-XII are grossly intact.  Neuropsychiatric: Normal affect, mood, orientation, memory seems poor  Skin: Very fragile, " bruising, sore bottom      Data   Recent Labs   Lab 04/14/22  0528 04/13/22  0446 04/12/22  2221 04/12/22  1627 04/12/22  0550   WBC 19.3* 26.3*  --   --  28.8*   HGB 10.3* 10.3*  --   --  8.8*   MCV 83 83  --   --  83    304  --   --  301    143  --   --  145   POTASSIUM 3.5 3.7 3.4*   < > 3.4*   CHLORIDE 105 108*  --   --  111*   CO2 28 26  --   --  21*   BUN 25 26  --   --  26   CR 0.91 1.10  --   --  1.23*   ANIONGAP 9 9  --   --  13   MESSI 7.9* 8.2*  --   --  8.5   GLC 97 83  --   --  85    < > = values in this interval not displayed.     Recent Results (from the past 24 hour(s))   XR Chest Port 1 View    Narrative    EXAM: XR CHEST PORT 1 VIEW  LOCATION: [facility]  DATE/TIME: 4/14/2022 10:22 AM    INDICATION: Worsening shortness of breath, coarse breath sounds.  COMPARISON: 04/10/2022. Others.      Impression    IMPRESSION:     Persistent dense left upper lobe consolidation. The surrounding opacities are stable to perhaps marginally improved, though the dense consolidative abnormality is without substantial change. Increased lucency superior to the consolidation could be from   aerated lung or developing cavitation. Recommend continued follow-up.    Small left pleural effusion. Right lung is grossly clear.    Stable cardiomediastinal silhouette. Atherosclerosis.    Right PICC tip near the distal SVC.      NOTE: ABNORMAL REPORT    THE DICTATION ABOVE DESCRIBES AN ABNORMALITY FOR WHICH FOLLOW-UP IS NEEDED.      Medications     - MEDICATION INSTRUCTIONS -         alteplase  2 mg Intravenous Q2H     famotidine  20 mg Oral At Bedtime     fluticasone  1 spray Both Nostrils Daily     fluticasone-vilanterol  1 puff Inhalation At Bedtime    And     umeclidinium  1 puff Inhalation At Bedtime     furosemide  20 mg Oral Daily     gabapentin  200 mg Oral BID     guaiFENesin  1,200 mg Oral BID     ipratropium - albuterol 0.5 mg/2.5 mg/3 mL  3 mL Nebulization 4x daily     lactobacillus  rhamnosus (GG)  1 capsule Oral BID     [Held by provider] levofloxacin  750 mg Oral Q48H     melatonin  5 mg Oral At Bedtime     metoprolol succinate ER  75 mg Oral At Bedtime     multivitamin RENAL  1 tablet Oral Daily     predniSONE  30 mg Oral Daily    Followed by     [START ON 4/16/2022] predniSONE  20 mg Oral Daily    Followed by     [START ON 4/19/2022] predniSONE  10 mg Oral Daily    Followed by     [START ON 4/22/2022] predniSONE  5 mg Oral Daily     rivaroxaban ANTICOAGULANT  15 mg Oral At Bedtime     simvastatin  40 mg Oral At Bedtime     sodium chloride  3 mL Nebulization 4x daily     tamoxifen  20 mg Oral QPM     traMADol  50 mg Oral BID     vancomycin  125 mg Oral 4x Daily

## 2022-04-15 NOTE — PROGRESS NOTES
Sepsis Evaluation Progress Note    I was called to see Irene León due to abnormal vital signs triggering the Sepsis SIRS screening alert. She is known to have an infection.     Physical Exam   Vital Signs:  Temp: (!) 96.3  F (35.7  C) Temp src: Oral BP: (!) 144/64 Pulse: 71   Resp: 22 SpO2: 96 % O2 Device: Nasal cannula Oxygen Delivery: 4 LPM     General: chronically ill appearing, appears more comfortable than yesterday  Mental Status: AAOx4.     Remainder of physical exam is significant for continued coarse breath sounds on left, diminished breath sounds right, improved from yesterday, harsh cough.     Data   Lactic Acid   Date Value Ref Range Status   04/10/2022 1.6 0.7 - 2.0 mmol/L Final   04/09/2022 2.2 (H) 0.7 - 2.0 mmol/L Final       Assessment & Plan   NO EVIDENCE OF SEPSIS at this time.  Vital sign, physical exam, and lab findings are due to continued infeciton, will reasses with morning labs and later this afternoon. .     Will check lactic acid when able to draw off PICC line. Lab has tried to draw morning labs already several times, so WBC triggering was from yesterday and has been improving.   She has known COPD, PNA, and is oxygen dependent so increased respiratory rate is somewhat expected.   Will recheck temperature.     Disposition: The patient will remain on the current unit. We will continue to monitor this patient closely..  Irene Lombardo MD    Sepsis Criteria   Sepsis: 2+ SIRS criteria due to infection  Severe Sepsis: Sepsis AND 1+ new sign of acute organ dysfunction (Note: lactate >2 or acute encephalopathy each qualify as organ dysfunction)  Septic Shock: Sepsis AND hypotension despite volume resuscitation with 30 ml/kg crystalloid or lactate >=4  Note: HYPOTENSION is defined as 2 BP readings measured 3 hrs apart that have a SBP <90, MAP <65, or decrease >40 mmHg, occurring 6 hrs before or after t-zero

## 2022-04-15 NOTE — PROGRESS NOTES
Brief Progress Note:    Received page at 9035 that patient had a 42 beat run of v tach. Patient had just had her blood pressure checked, and was sleepy. She denied any symptoms, including chest pain, SOB, or palpitations.    Will draw morning labs early, which includes BMP, CBC, Mag, Phos, and CRP.  Will continue to monitor telemetry closely.   EKG at 0630.    Patient discussed with Dr. Puri.    Renata Rizo MD, PGY-2  [facility]  April 15, 2022

## 2022-04-15 NOTE — PLAN OF CARE
Problem: COPD (Chronic Obstructive Pulmonary Disease) Comorbidity  Goal: Maintenance of COPD Symptom Control  Outcome: Ongoing, Not Progressing  Intervention: Maintain COPD-Symptom Control  Recent Flowsheet Documentation  Taken 4/15/2022 1215 by Ofelia Govea RN  Medication Review/Management: medications reviewed  Taken 4/15/2022 0815 by Ofelia Govea RN  Medication Review/Management: medications reviewed     Problem: Infection  Goal: Absence of Infection Signs and Symptoms  Outcome: Ongoing, Not Progressing  Intervention: Prevent or Manage Infection  Recent Flowsheet Documentation  Taken 4/15/2022 1215 by Ofelia Govea RN  Isolation Precautions: enteric precautions maintained  Taken 4/15/2022 0815 by Ofelia Govea RN  Isolation Precautions: enteric precautions maintained       Pt A&Ox4, Continues on 4LO2 NC, SOB with activity, and becomes weak. Lactic 1.2. ID and pulmonary consult obtained. Uses the purwick when in bed. PICC line patent, blood return noted. Family present this AM. Denies having pain. Call light within reach. Bed alarm on.

## 2022-04-15 NOTE — PROGRESS NOTES
PULMONARY MEDICINE PROGRESS NOTE  4/12/2022    Admit Date: 4/7/2022  CODE: No CPR- Do NOT Intubate    Reason for Consult: acute exacerbation of COPD, lung abscess     Assessment/Plan:   77 year old female with a history of severe asthma-COPD overlap syndrome with hyper-IgE followed at [facility], now in with severe left-sided CAP and AECOPD.  Patient had been started on levofloxacin for her presumed pneumonia.  However, a follow-up CT scan of the chest, her lung consolidation is progressed to a abscess, cannot rule out underlying malignancy.     CAP, AECOPD: Baseline severe COPD, followed at [facility]. On high-dose triple-therapy and oxygen at baseline. Goal SpO2 88-92%. Now with severe left-sided CAP that has progressed and now has a cavitary lesion. Other options outpatient include macrolide MWF, roflumilast, also has hyper-IgE, was discussing omalizumab or other biologic with her pulmonologist.  During her hospital stay, patient has been evaluated by palliative medicine.  Her CODE STATUS has been changed to DNR/DNI.  She also has been treated for C. difficile colitis on p.o. vancomycin 125 mg 4 times daily for 10 days.  In addition she completed a course of doxycycline and Augmentin on 4/14/2022 and is on steroid taper.  She was started on levofloxacin on 4/14/2022.    Plan:  -oxygen goal SpO2 88-92%  -Start IV Zosyn.  Patient will need 3 to 4-week course of antibiotics.  Ideally, would like to perform a bronchoscopy however given patient's poor lung reserve, she would likely be on mechanical ventilation indefinitely which goes against her will.   -Follow-up sputum for Gram stain culture,  CRP, MRSA screen  -Bronchodilators   -continue high-dose fluticasone furoate-umeclidinium-vilanterol (Trelelgy Ellipta) on discharge    Bernard Newman MD                                                                                                                                                            SUBJECTIVE/INTERVAL  "HISTORY     Breathing okay.  On oxygen at 4 L/min.  Cough is mostly nonproductive.                                                                                                                                                    Exam/Data:     Vitals  BP 99/48 (BP Location: Left arm, Patient Position: Semi-Scanlon's, Cuff Size: Adult Regular)   Pulse 78   Temp 98.1  F (36.7  C) (Oral)   Resp 21   Ht 1.499 m (4' 11\")   Wt 54.6 kg (120 lb 6.4 oz)   SpO2 96%   BMI 24.32 kg/m       I/O last 3 completed shifts:  In: -   Out: 150 [Urine:150]  Weight change:   [unfilled]  EXAM:  BP 99/48 (BP Location: Left arm, Patient Position: Semi-Scanlon's, Cuff Size: Adult Regular)   Pulse 78   Temp 98.1  F (36.7  C) (Oral)   Resp 21   Ht 1.499 m (4' 11\")   Wt 54.6 kg (120 lb 6.4 oz)   SpO2 96%   BMI 24.32 kg/m      Intake/Output last 3 shifts:  I/O last 3 completed shifts:  In: -   Out: 150 [Urine:150]  Intake/Output this shift:  No intake/output data recorded.    Physical Exam  Gen: alert, oriented  HEENT: NT, no CHRISTINA  CV: RRR, no m/g/r  Resp: crackles right > left  Abd: soft, nontender, BS+  Skin: no rashes or lesions  Ext: no edema  Neuro: PERRL, nonfocal exam    ROS: A complete 10-system review of systems was obtained and is negative with the exception of what is noted in the history of present illness.    Medications:       - MEDICATION INSTRUCTIONS -         famotidine  20 mg Oral At Bedtime     fluticasone  1 spray Both Nostrils Daily     fluticasone-vilanterol  1 puff Inhalation At Bedtime    And     umeclidinium  1 puff Inhalation At Bedtime     furosemide  20 mg Oral Daily     [START ON 4/16/2022] gabapentin  200 mg Oral QAM     gabapentin  300 mg Oral At Bedtime     guaiFENesin  1,200 mg Oral BID     ipratropium - albuterol 0.5 mg/2.5 mg/3 mL  3 mL Nebulization 4x daily     lactobacillus rhamnosus (GG)  1 capsule Oral BID     melatonin  5 mg Oral At Bedtime     metoprolol succinate ER  75 mg Oral At Bedtime "     multivitamin RENAL  1 tablet Oral Daily     piperacillin-tazobactam  3.375 g Intravenous Q8H     [START ON 4/16/2022] predniSONE  20 mg Oral Daily    Followed by     [START ON 4/19/2022] predniSONE  10 mg Oral Daily    Followed by     [START ON 4/22/2022] predniSONE  5 mg Oral Daily     rivaroxaban ANTICOAGULANT  15 mg Oral At Bedtime     simvastatin  40 mg Oral At Bedtime     sodium chloride  3 mL Nebulization 4x daily     tamoxifen  20 mg Oral QPM     traMADol  50 mg Oral BID     vancomycin  125 mg Oral 4x Daily         DATA  All laboratory and radiology has been personally reviewed by myself today.  Recent Labs   Lab 04/15/22  1028   WBC 25.8*   HGB 11.4*   HCT 34.0*        Recent Labs   Lab 04/15/22  1028 04/14/22  0528 04/13/22  0446    142 143   CO2 30 28 26   BUN 29* 25 26     PFT DATA (October 2019):  FEV1 0.85 with significant BD response, DLCO 36%     IMAGING:     Ct chest:  IMPRESSION:   1.  There is new cavity formation involving the left upper lobe pneumonia with a 5 cm cavity now present within the densely consolidated lung.  2.  Diffuse emphysema unchanged.      CT C/A/P:  FINDINGS:   LUNGS AND PLEURA: Moderate irregular consolidation in the posterior left upper lobe. Confluent emphysema. Mild bibasilar atelectasis/scar. Trace pleural effusions. Stable mild diffuse bronchial wall thickening. A few stable benign pulmonary nodules.     MEDIASTINUM/AXILLAE: Right PICC. Mediastinal lymphadenopathy with the largest largest being a right paratracheal node measuring 1.6 cm in short axis, image 48:3. Small pericardial effusion. The main pulmonary artery is enlarged at 3.0 cm which may be   seen with pulmonary artery hypertension.     CORONARY ARTERY CALCIFICATION: Moderate.     HEPATOBILIARY: Normal.     PANCREAS: Normal.     SPLEEN: Normal.     ADRENAL GLANDS: Normal.     KIDNEYS/BLADDER: Low-lying right kidney. Subcentimeter renal hypodensities which are too small to characterize. No  hydronephrosis.     BOWEL: No obstruction or inflammatory change. Normal appendix.     LYMPH NODES: Normal.     VASCULATURE: Stable ectasia of the abdominal aorta measuring 2.9 x 2.3 cm, image 169:3. Moderate atherosclerosis.     PELVIC ORGANS: Normal.     MUSCULOSKELETAL: Left hip arthroplasty. Osseous demineralization. Degenerative changes of the spine. Remote left rib fractures. T12 compression fracture with 80% loss of height which is likely remote.                                                                      IMPRESSION:  1.  Moderate left upper lobe consolidation. This is consistent with pneumonia. Recommend a follow-up chest radiograph in 4-6 weeks to document resolution.  2.  Mediastinal lymphadenopathy.  3.  No acute abnormality in the abdomen or pelvis.

## 2022-04-16 ENCOUNTER — APPOINTMENT (OUTPATIENT)
Dept: PHYSICAL THERAPY | Facility: CLINIC | Age: 77
DRG: 177 | End: 2022-04-16
Payer: MEDICARE

## 2022-04-16 ENCOUNTER — APPOINTMENT (OUTPATIENT)
Dept: OCCUPATIONAL THERAPY | Facility: CLINIC | Age: 77
DRG: 177 | End: 2022-04-16
Payer: MEDICARE

## 2022-04-16 LAB
ANION GAP SERPL CALCULATED.3IONS-SCNC: 6 MMOL/L (ref 5–18)
BACTERIA SPT CULT: NORMAL
BASOPHILS # BLD AUTO: 0 10E3/UL (ref 0–0.2)
BASOPHILS NFR BLD AUTO: 0 %
BUN SERPL-MCNC: 23 MG/DL (ref 8–28)
C REACTIVE PROTEIN LHE: 8.7 MG/DL (ref 0–0.8)
CALCIUM SERPL-MCNC: 7.6 MG/DL (ref 8.5–10.5)
CD3 CELLS # BLD: 317 CELLS/UL (ref 603–2990)
CD3 CELLS NFR BLD: 58 % (ref 49–84)
CD3+CD4+ CELLS # BLD: 274 CELLS/UL (ref 441–2156)
CD3+CD4+ CELLS NFR BLD: 50 % (ref 28–63)
CD3+CD4+ CELLS/CD3+CD8+ CLL BLD: 6.18 % (ref 1.4–2.6)
CD3+CD8+ CELLS # BLD: 44 CELLS/UL (ref 125–1312)
CD3+CD8+ CELLS NFR BLD: 8 % (ref 10–40)
CHLORIDE BLD-SCNC: 105 MMOL/L (ref 98–107)
CO2 SERPL-SCNC: 30 MMOL/L (ref 22–31)
CREAT SERPL-MCNC: 0.88 MG/DL (ref 0.6–1.1)
CRYPTOC AG SPEC QL: NEGATIVE
EOSINOPHIL # BLD AUTO: 0 10E3/UL (ref 0–0.7)
EOSINOPHIL NFR BLD AUTO: 0 %
ERYTHROCYTE [DISTWIDTH] IN BLOOD BY AUTOMATED COUNT: 14.7 % (ref 10–15)
GFR SERPL CREATININE-BSD FRML MDRD: 67 ML/MIN/1.73M2
GLUCOSE BLD-MCNC: 73 MG/DL (ref 70–125)
GRAM STAIN RESULT: NORMAL
HCT VFR BLD AUTO: 29.7 % (ref 35–47)
HGB BLD-MCNC: 9.8 G/DL (ref 11.7–15.7)
IMM GRANULOCYTES # BLD: 0.4 10E3/UL
IMM GRANULOCYTES NFR BLD: 2 %
LYMPHOCYTES # BLD AUTO: 1.6 10E3/UL (ref 0.8–5.3)
LYMPHOCYTES NFR BLD AUTO: 8 %
MAGNESIUM SERPL-MCNC: 2.3 MG/DL (ref 1.8–2.6)
MCH RBC QN AUTO: 28.3 PG (ref 26.5–33)
MCHC RBC AUTO-ENTMCNC: 33 G/DL (ref 31.5–36.5)
MCV RBC AUTO: 86 FL (ref 78–100)
MONOCYTES # BLD AUTO: 1.6 10E3/UL (ref 0–1.3)
MONOCYTES NFR BLD AUTO: 8 %
NEUTROPHILS # BLD AUTO: 16.4 10E3/UL (ref 1.6–8.3)
NEUTROPHILS NFR BLD AUTO: 82 %
NRBC # BLD AUTO: 0 10E3/UL
NRBC BLD AUTO-RTO: 0 /100
PATH INTERP SPEC-IMP: NORMAL
PHOSPHATE SERPL-MCNC: 3.7 MG/DL (ref 2.5–4.5)
PLATELET # BLD AUTO: 307 10E3/UL (ref 150–450)
POTASSIUM BLD-SCNC: 4.2 MMOL/L (ref 3.5–5)
RBC # BLD AUTO: 3.46 10E6/UL (ref 3.8–5.2)
SODIUM SERPL-SCNC: 141 MMOL/L (ref 136–145)
T CELL COMMENT: ABNORMAL
WBC # BLD AUTO: 20.1 10E3/UL (ref 4–11)

## 2022-04-16 PROCEDURE — 80048 BASIC METABOLIC PNL TOTAL CA: CPT | Performed by: STUDENT IN AN ORGANIZED HEALTH CARE EDUCATION/TRAINING PROGRAM

## 2022-04-16 PROCEDURE — 85025 COMPLETE CBC W/AUTO DIFF WBC: CPT | Performed by: STUDENT IN AN ORGANIZED HEALTH CARE EDUCATION/TRAINING PROGRAM

## 2022-04-16 PROCEDURE — 87899 AGENT NOS ASSAY W/OPTIC: CPT | Performed by: INTERNAL MEDICINE

## 2022-04-16 PROCEDURE — 250N000013 HC RX MED GY IP 250 OP 250 PS 637: Performed by: STUDENT IN AN ORGANIZED HEALTH CARE EDUCATION/TRAINING PROGRAM

## 2022-04-16 PROCEDURE — 250N000009 HC RX 250: Performed by: FAMILY MEDICINE

## 2022-04-16 PROCEDURE — 87305 ASPERGILLUS AG IA: CPT | Performed by: INTERNAL MEDICINE

## 2022-04-16 PROCEDURE — 250N000012 HC RX MED GY IP 250 OP 636 PS 637

## 2022-04-16 PROCEDURE — 87385 HISTOPLASMA CAPSUL AG IA: CPT | Performed by: INTERNAL MEDICINE

## 2022-04-16 PROCEDURE — 97110 THERAPEUTIC EXERCISES: CPT | Mod: GP

## 2022-04-16 PROCEDURE — 97116 GAIT TRAINING THERAPY: CPT | Mod: GP

## 2022-04-16 PROCEDURE — 86612 BLASTOMYCES ANTIBODY: CPT | Performed by: INTERNAL MEDICINE

## 2022-04-16 PROCEDURE — 99233 SBSQ HOSP IP/OBS HIGH 50: CPT | Mod: GC

## 2022-04-16 PROCEDURE — 87205 SMEAR GRAM STAIN: CPT | Performed by: STUDENT IN AN ORGANIZED HEALTH CARE EDUCATION/TRAINING PROGRAM

## 2022-04-16 PROCEDURE — 250N000011 HC RX IP 250 OP 636: Performed by: INTERNAL MEDICINE

## 2022-04-16 PROCEDURE — 83735 ASSAY OF MAGNESIUM: CPT

## 2022-04-16 PROCEDURE — 94640 AIRWAY INHALATION TREATMENT: CPT | Mod: 76

## 2022-04-16 PROCEDURE — 250N000013 HC RX MED GY IP 250 OP 250 PS 637: Performed by: FAMILY MEDICINE

## 2022-04-16 PROCEDURE — 250N000009 HC RX 250: Performed by: STUDENT IN AN ORGANIZED HEALTH CARE EDUCATION/TRAINING PROGRAM

## 2022-04-16 PROCEDURE — 250N000011 HC RX IP 250 OP 636: Performed by: STUDENT IN AN ORGANIZED HEALTH CARE EDUCATION/TRAINING PROGRAM

## 2022-04-16 PROCEDURE — 87102 FUNGUS ISOLATION CULTURE: CPT | Performed by: INTERNAL MEDICINE

## 2022-04-16 PROCEDURE — 250N000013 HC RX MED GY IP 250 OP 250 PS 637

## 2022-04-16 PROCEDURE — 87206 SMEAR FLUORESCENT/ACID STAI: CPT | Performed by: INTERNAL MEDICINE

## 2022-04-16 PROCEDURE — 84100 ASSAY OF PHOSPHORUS: CPT | Performed by: STUDENT IN AN ORGANIZED HEALTH CARE EDUCATION/TRAINING PROGRAM

## 2022-04-16 PROCEDURE — 999N000157 HC STATISTIC RCP TIME EA 10 MIN

## 2022-04-16 PROCEDURE — 99232 SBSQ HOSP IP/OBS MODERATE 35: CPT | Performed by: INTERNAL MEDICINE

## 2022-04-16 PROCEDURE — 86140 C-REACTIVE PROTEIN: CPT | Performed by: STUDENT IN AN ORGANIZED HEALTH CARE EDUCATION/TRAINING PROGRAM

## 2022-04-16 PROCEDURE — 120N000001 HC R&B MED SURG/OB

## 2022-04-16 PROCEDURE — 97110 THERAPEUTIC EXERCISES: CPT | Mod: GO

## 2022-04-16 PROCEDURE — 99233 SBSQ HOSP IP/OBS HIGH 50: CPT | Performed by: INTERNAL MEDICINE

## 2022-04-16 PROCEDURE — 94799 UNLISTED PULMONARY SVC/PX: CPT

## 2022-04-16 RX ORDER — MEROPENEM 1 G/1
1 INJECTION, POWDER, FOR SOLUTION INTRAVENOUS EVERY 12 HOURS
Status: DISCONTINUED | OUTPATIENT
Start: 2022-04-16 | End: 2022-04-22 | Stop reason: HOSPADM

## 2022-04-16 RX ADMIN — Medication 1 CAPSULE: at 20:38

## 2022-04-16 RX ADMIN — SODIUM CHLORIDE SOLN NEBU 3% 3 ML: 3 NEBU SOLN at 15:58

## 2022-04-16 RX ADMIN — SODIUM CHLORIDE SOLN NEBU 3% 3 ML: 3 NEBU SOLN at 11:02

## 2022-04-16 RX ADMIN — IPRATROPIUM BROMIDE AND ALBUTEROL SULFATE 3 ML: 2.5; .5 SOLUTION RESPIRATORY (INHALATION) at 11:01

## 2022-04-16 RX ADMIN — TAMOXIFEN CITRATE 20 MG: 10 TABLET ORAL at 20:38

## 2022-04-16 RX ADMIN — SIMVASTATIN 40 MG: 40 TABLET, FILM COATED ORAL at 20:38

## 2022-04-16 RX ADMIN — FAMOTIDINE 20 MG: 20 TABLET ORAL at 20:38

## 2022-04-16 RX ADMIN — VANCOMYCIN HYDROCHLORIDE 125 MG: 125 CAPSULE ORAL at 08:56

## 2022-04-16 RX ADMIN — GABAPENTIN 200 MG: 100 CAPSULE ORAL at 06:39

## 2022-04-16 RX ADMIN — MEROPENEM 1 G: 1 INJECTION, POWDER, FOR SOLUTION INTRAVENOUS at 17:59

## 2022-04-16 RX ADMIN — METOPROLOL SUCCINATE 75 MG: 50 TABLET, EXTENDED RELEASE ORAL at 20:43

## 2022-04-16 RX ADMIN — TRAMADOL HYDROCHLORIDE 50 MG: 50 TABLET, COATED ORAL at 20:38

## 2022-04-16 RX ADMIN — B-COMPLEX W/ C & FOLIC ACID TAB 1 MG 1 TABLET: 1 TAB at 08:56

## 2022-04-16 RX ADMIN — TRAMADOL HYDROCHLORIDE 50 MG: 50 TABLET, COATED ORAL at 08:56

## 2022-04-16 RX ADMIN — GUAIFENESIN 1200 MG: 600 TABLET ORAL at 20:37

## 2022-04-16 RX ADMIN — VANCOMYCIN HYDROCHLORIDE 125 MG: 125 CAPSULE ORAL at 20:38

## 2022-04-16 RX ADMIN — VANCOMYCIN HYDROCHLORIDE 125 MG: 125 CAPSULE ORAL at 18:14

## 2022-04-16 RX ADMIN — Medication 1 CAPSULE: at 08:56

## 2022-04-16 RX ADMIN — FUROSEMIDE 20 MG: 20 TABLET ORAL at 08:55

## 2022-04-16 RX ADMIN — PREDNISONE 20 MG: 20 TABLET ORAL at 08:56

## 2022-04-16 RX ADMIN — Medication 5 MG: at 20:37

## 2022-04-16 RX ADMIN — TRAMADOL HYDROCHLORIDE 50 MG: 50 TABLET, COATED ORAL at 13:52

## 2022-04-16 RX ADMIN — IPRATROPIUM BROMIDE AND ALBUTEROL SULFATE 3 ML: 2.5; .5 SOLUTION RESPIRATORY (INHALATION) at 15:58

## 2022-04-16 RX ADMIN — RIVAROXABAN 15 MG: 15 TABLET, FILM COATED ORAL at 20:38

## 2022-04-16 RX ADMIN — IPRATROPIUM BROMIDE AND ALBUTEROL SULFATE 3 ML: 2.5; .5 SOLUTION RESPIRATORY (INHALATION) at 20:35

## 2022-04-16 RX ADMIN — SODIUM CHLORIDE SOLN NEBU 3% 3 ML: 3 NEBU SOLN at 20:35

## 2022-04-16 RX ADMIN — VANCOMYCIN HYDROCHLORIDE 125 MG: 125 CAPSULE ORAL at 13:41

## 2022-04-16 RX ADMIN — GUAIFENESIN 1200 MG: 600 TABLET ORAL at 08:56

## 2022-04-16 RX ADMIN — MEROPENEM 1 G: 1 INJECTION, POWDER, FOR SOLUTION INTRAVENOUS at 03:54

## 2022-04-16 RX ADMIN — GABAPENTIN 300 MG: 300 CAPSULE ORAL at 20:38

## 2022-04-16 ASSESSMENT — ACTIVITIES OF DAILY LIVING (ADL)
ADLS_ACUITY_SCORE: 11

## 2022-04-16 NOTE — PLAN OF CARE
Goal Outcome Evaluation:    Plan of Care Reviewed With: patient     Overall Patient Progress: improving    Pt A&Ox4, on 3LO2 NC, did not get out of bed this shift,purewick in place, had a sm incontinent loose brown BM, barrier cream applied to perineum area for redness, PICC line patent, lab drawn, denies pain , infrequent nonproductive cough, on Mg, potassium and phosphrus protocols, on airborne/ enteric precautions for TB rule out and c-diff.

## 2022-04-16 NOTE — PROGRESS NOTES
PULMONARY MEDICINE PROGRESS NOTE  04/16/2022    Admit Date: 4/7/2022  CODE: No CPR- Do NOT Intubate    Reason for Consult: acute exacerbation of COPD, lung abscess     Assessment/Plan:     77 year old female with a history of severe asthma-COPD overlap syndrome with hyper-IgE followed at [facility], now in with severe left-sided CAP and AECOPD.  Patient had been started on levofloxacin for her presumed pneumonia.  However, a follow-up CT scan of the chest, her lung consolidation is progressed to a abscess, although malignancy remains on the differential given her significant weight loss prior to admission.       CAP, AECOPD: Baseline severe COPD, followed at [facility]. On high-dose triple-therapy and oxygen at baseline. Goal SpO2 88-92%. Now with severe left-sided CAP that has progressed and now has a cavitary lesion. Other options considered as outpatient include macrolide MWF, roflumilast, also has hyper-IgE, was discussing omalizumab or other biologic with her pulmonologist.  During her hospital stay, patient has been evaluated by palliative medicine.  Her CODE STATUS has been changed to DNR/DNI.  She also has been treated for C. difficile colitis on p.o. vancomycin 125 mg 4 times daily for 10 days.  In addition she completed a course of doxycycline and Augmentin on 4/14/2022 and is on steroid taper.  She was started on levofloxacin on 4/14/2022.  Repeat CT scan of the chest showed her pulmonary consolidation in the left upper lobe has progressed to a cavitary lesion.  Differential is broad and concerning for worsening pneumonia with cavitation vs abscess vs malignancy.  CRP mildly elevated on admission and has progressively improved.  Aspergillus galactomannan antigen negative.  MRSA screen negative.  Sputum cultures grew mixed cultures.    Plan:  -Oxygen goal SpO2 88-92%  -Started on IV Zosyn.  Patient will need 3 to 4-week course of antibiotics.  Ideally, would like to perform a bronchoscopy however pt would not want  "to undergo procedure due to risk of mechanical ventilation given her poor lung reserve which is appropriate. This was clarified with daughter nadia.  -WBC trending down.  -Follow-up sputum for Gram stain/culture, sputum for AFB and fungal serology  -Hold Breo Ellipta (fluticasone-Vilanterol) and Incruse Ellipta (umeclidinium) while patient is on scheduled nebs  -Bronchodilators  -Continue high-dose fluticasone furoate-umeclidinium-vilanterol (Trelelgy Ellipta) on discharge    Bernard Newman MD    Updated daughter Nadia.                                                                                                                                                         SUBJECTIVE/INTERVAL HISTORY     Continues to have sob but feels okay otherwise. Her cough is nonproductive. On oxygen at 3-4 LPM. No fevers.                                                                                                                                                    Exam/Data:     Vitals  /60 (BP Location: Left arm)   Pulse 75   Temp 97.5  F (36.4  C) (Oral)   Resp 18   Ht 1.499 m (4' 11\")   Wt 50.4 kg (111 lb 1.6 oz)   SpO2 94%   BMI 22.44 kg/m       I/O last 3 completed shifts:  In: 380 [P.O.:380]  Out: 500 [Urine:500]  Weight change:     EXAM:  /60 (BP Location: Left arm)   Pulse 75   Temp 97.5  F (36.4  C) (Oral)   Resp 18   Ht 1.499 m (4' 11\")   Wt 50.4 kg (111 lb 1.6 oz)   SpO2 94%   BMI 22.44 kg/m      Intake/Output last 3 shifts:  I/O last 3 completed shifts:  In: 380 [P.O.:380]  Out: 500 [Urine:500]  Intake/Output this shift:  No intake/output data recorded.      Gen: alert, oriented  HEENT: NT, no CHRISTINA  CV: RRR, no m/g/r  Resp: faint crackles right > left, diminished  Abd: soft, nontender, BS+  Skin: no rashes or lesions  Ext: no edema   Neuro: PERRL, nonfocal exam    ROS: A complete 10-system review of systems was obtained and is negative with the exception of what is noted in the history of " present illness.    Medications:       - MEDICATION INSTRUCTIONS -         famotidine  20 mg Oral At Bedtime     fluticasone  1 spray Both Nostrils Daily     fluticasone-vilanterol  1 puff Inhalation At Bedtime    And     umeclidinium  1 puff Inhalation At Bedtime     furosemide  20 mg Oral Daily     gabapentin  200 mg Oral QAM     gabapentin  300 mg Oral At Bedtime     guaiFENesin  1,200 mg Oral BID     ipratropium - albuterol 0.5 mg/2.5 mg/3 mL  3 mL Nebulization 4x daily     lactobacillus rhamnosus (GG)  1 capsule Oral BID     melatonin  5 mg Oral At Bedtime     meropenem  1 g Intravenous Q12H     metoprolol succinate ER  75 mg Oral At Bedtime     multivitamin RENAL  1 tablet Oral Daily     predniSONE  20 mg Oral Daily    Followed by     [START ON 4/19/2022] predniSONE  10 mg Oral Daily    Followed by     [START ON 4/22/2022] predniSONE  5 mg Oral Daily     rivaroxaban ANTICOAGULANT  15 mg Oral At Bedtime     simvastatin  40 mg Oral At Bedtime     sodium chloride  3 mL Nebulization 4x daily     tamoxifen  20 mg Oral QPM     traMADol  50 mg Oral BID     vancomycin  125 mg Oral 4x Daily         DATA  All laboratory and radiology has been personally reviewed by myself today.  Recent Labs   Lab 04/16/22  0636   WBC 20.1*   HGB 9.8*   HCT 29.7*        Recent Labs   Lab 04/16/22  0636 04/15/22  1028 04/14/22  0528    141 142   CO2 30 30 28   BUN 23 29* 25     PFT DATA (October 2019):  FEV1 0.85 with significant BD response, DLCO 36%     IMAGING:     Ct chest:  IMPRESSION:   1.  There is new cavity formation involving the left upper lobe pneumonia with a 5 cm cavity now present within the densely consolidated lung.  2.  Diffuse emphysema unchanged.      CT C/A/P:  FINDINGS:   LUNGS AND PLEURA: Moderate irregular consolidation in the posterior left upper lobe. Confluent emphysema. Mild bibasilar atelectasis/scar. Trace pleural effusions. Stable mild diffuse bronchial wall thickening. A few stable benign  pulmonary nodules.     MEDIASTINUM/AXILLAE: Right PICC. Mediastinal lymphadenopathy with the largest largest being a right paratracheal node measuring 1.6 cm in short axis, image 48:3. Small pericardial effusion. The main pulmonary artery is enlarged at 3.0 cm which may be   seen with pulmonary artery hypertension.     CORONARY ARTERY CALCIFICATION: Moderate.     HEPATOBILIARY: Normal.     PANCREAS: Normal.     SPLEEN: Normal.     ADRENAL GLANDS: Normal.     KIDNEYS/BLADDER: Low-lying right kidney. Subcentimeter renal hypodensities which are too small to characterize. No hydronephrosis.     BOWEL: No obstruction or inflammatory change. Normal appendix.     LYMPH NODES: Normal.     VASCULATURE: Stable ectasia of the abdominal aorta measuring 2.9 x 2.3 cm, image 169:3. Moderate atherosclerosis.     PELVIC ORGANS: Normal.     MUSCULOSKELETAL: Left hip arthroplasty. Osseous demineralization. Degenerative changes of the spine. Remote left rib fractures. T12 compression fracture with 80% loss of height which is likely remote.                                                                      IMPRESSION:  1.  Moderate left upper lobe consolidation. This is consistent with pneumonia. Recommend a follow-up chest radiograph in 4-6 weeks to document resolution.  2.  Mediastinal lymphadenopathy.  3.  No acute abnormality in the abdomen or pelvis.

## 2022-04-16 NOTE — CONSULTS
[facility]  General ID Service Consult      Patient: Irene León  YOB: 1945, MRN: [id number]  Date of Admission:  4/7/2022  Date of Consult: 04/15/2022  Consult Requested by: Janae Perez MD  Admission Diagnosis: COPD exacerbation (H) [J44.1]  Pneumonia due to infectious organism, unspecified laterality, unspecified part of lung [J18.9]      ID Assessment & Plan   Cavitary pneumonia. Rapid progression into cavitation in hospital while on Zosyn.   With elevated procalcitonin and significant leukocytosis and poor dentition this suggests polymicrobial bacterial infection  However what is concerning is progressive decline over many months and 40 pound weight loss which could signal either malignancy or chronic infection such as mycobacterial or fungal, with mediastinal lymphadenopathy visualized on CT chest    PLAN  We need microbiology  Sputum cx pending but appeared low quality  Sputum induction for AFB and routine cx  Airborne isolation  QFG  serology for endemic fungi  CD4  Discontinue zosyn an do meropenem  Keep vanco pending cultures and MRSA screen  Discussed with nursing and staff      Isai Little MD  [facility]  ______________________________________________________________________      History of Present Illness   77 year old female who has a history of HLD, CHF, COPD, HTN, Factor V Leiden on Xarelto, CKD, Osteoporosis and is admitted for worsening SOB as the cardinal syx.  She is on home oxygen 2 liters and this has not changed.  She denies chills.  She is unable to pinpoint time of onset of decompensation and mother describes progressive decline over at least a year with unintentional 40 pound weight loss over 16-month.  No hemoptysis.  She has not been on much steroids for her COPD over the last 6-month.  She might have travelled in the past to [place].  No history of tuberculosis.  She has dental issues but has not been to the  dentist in a while.  She lives with her  who has dementia.  After admission she was started on Zosyn and doxycycline 7 days ago but she has had persistent leukocytosis in the 20 30,000 range.  Procalcitonin on admission was 6.5 not repeated.  Vancomycin was added 3 days ago without effect.  Note that no fever was recorded with a T-max of 97.4 throughout this admission    CT chest on admission showed left upper lobe consolidation.  A repeat now showing cavity with thick rim.    Radiology personally reviewed  CT  FINDINGS:   LUNGS AND PLEURA: There is now prominent cavity within the consolidated focus left upper lobe. Cavity measures approximately 5 cm with surrounding thick wall and lung consolidation surrounding fat. Mild increase in overall size of left upper lobe   involvement.     Tiny left pleural effusion now present. Minimal linear density at lung bases. Slight mucus plugging seen posterior right lower lobe. Prominent diffuse changes of emphysema.     MEDIASTINUM/AXILLAE: Small reactive lymph nodes are stable. Heavy atherosclerotic calcifications of aorta.     CORONARY ARTERY CALCIFICATION: Moderate.     UPPER ABDOMEN: Diffuse atherosclerotic calcifications.     MUSCULOSKELETAL: Scoliosis and severe compression of T12 unchanged       Review of Systems   The 10 point Review of Systems is negative other than noted in the HPI or here.     Past Medical History    Past Medical History:   Diagnosis Date     ANATOLIY (acute kidney injury) (H)      Allergic rhinitis      Arrhythmia      Atrial fibrillation with RVR (H)      Bacteremia      Breast cancer (H) 2017     Cardiomyopathy (H)      Centrilobular emphysema (H)      CHF (congestive heart failure) (H)      CKD (chronic kidney disease)      COPD (chronic obstructive pulmonary disease) (H)      Coronary artery disease      Depression      Dysphagia      E. coli sepsis (H)      Factor 5 Leiden mutation, heterozygous (H)      GERD (gastroesophageal reflux disease)       Hx of radiation therapy 2017     Hyperlipidemia      Hypertension      Hypokalemia      Hypomagnesemia      Idiopathic cardiomyopathy (H)      Left hip pain 2014     OAB (overactive bladder)      Osteoporosis      Sacral insufficiency fracture      Sinusitis      Syncope      Urge incontinence      Vocal cord dysfunction        Past Surgical History   Past Surgical History:   Procedure Laterality Date     ARTHROPLASTY REVISION HIP Left      BIOPSY BREAST Right 2017     BLADDER SURGERY      bladder interstim with removal     CARDIAC CATHETERIZATION       CATARACT EXTRACTION Left 2017     IR ABDOMINAL AORTOGRAM  2003     IR AORTIC ARCH 4 VESSEL ANGIOGRAM  2003     IR MISCELLANEOUS PROCEDURE  2003     LUMPECTOMY BREAST Left 06/2017    x2     PICC DOUBLE LUMEN PLACEMENT  2022          PICC TRIPLE LUMEN PLACEMENT  2022          ZZC OPEN TX FEMORAL FRACTURE DISTAL MED/LAT CONDYLE Left 10/28/2015    Procedure: OPEN REDUCTION INTERNAL FIXATION LEFT  PROXIMAL FEMUR PERIPROSTHETIC FRACTURE;  Surgeon: Yovanny Albarran MD;  Location: [facility];  Service: Orthopedics       Social History   Social History     Tobacco Use     Smoking status: Former Smoker     Quit date: 10/28/2007     Years since quittin.4     Smokeless tobacco: Former User     Quit date: 2004   Substance Use Topics     Alcohol use: No     Drug use: No       Family History   I have reviewed this patient's family history and updated it with pertinent information if needed.  Family History   Problem Relation Age of Onset     Osteoporosis Other      Prostate Cancer Brother      Breast Cancer Maternal Aunt         age thought to be in her 70's-80's     Prostate Cancer Maternal Uncle        Medications   I have reviewed this patient's current medications    Allergies   Allergies   Allergen Reactions     Sulfa (Sulfonamide Antibiotics) [Sulfa Drugs] Rash     Headaches and dizziness.     Homeopathic Drugs [External  Allergen Needs Reconciliation - See Comment] Unknown and Other (See Comments)     Comment: runny nose, Comment: runny nose     Mold Extracts [Molds & Smuts] Unknown     Morphine (Pf) [Morphine] Unknown     hallucinate     Lisinopril Cough, Unknown and Itching     Comment: cough, Comment: cough     Sulfacetamide Sodium [Sulfacetamide] Rash       Physical Exam   Vital Signs: Temp: 98.1  F (36.7  C) Temp src: Oral BP: 99/48 Pulse: 78   Resp: 21 SpO2: 96 % O2 Device: Nasal cannula Oxygen Delivery: 2 LPM  Weight: 120 lbs 6.4 oz    Gen. appearance nontoxic  Eyes no conjunctivitis or icterus  Neck no stiffness or neck vein distention, no LN  Lungs coarse  Heart no edema  Abdomen soft not tender  Extremities no synovitis, trace edema  Skin  no rash or emboli, stasis dermatitis hyperpigmentation  Neurologic alert oriented no focal deficits        Data   Inflammatory Markers   Recent Labs   Lab Test 04/15/22  1028 04/14/22  0528 04/13/22  0446 04/12/22  0550 04/11/22  0417 04/10/22  1452 04/09/22  0628 04/08/22  0443   CRP 8.9* 10.1* 13.9* 20.9* 19.6* 13.5* 27.7* 34.2*        Hematology Studies   Recent Labs   Lab Test 04/15/22  1028 04/14/22  0528 04/13/22  0446 04/12/22  0550 04/11/22  1316 04/11/22  0417 04/08/22  0443 04/07/22  1437   WBC 25.8* 19.3* 26.3* 28.8* 36.3* 32.9*   < > 36.5*   ANEU  --   --   --   --   --  28.6*  --  31.8*   AEOS  --   --   --   --   --  0.0  --  0.0   HGB 11.4* 10.3* 10.3* 8.8* 11.7 10.4*   < > 10.9*   MCV 85 83 83 83 83 86   < > 86    310 304 301 323 277   < > 241    < > = values in this interval not displayed.       Metabolic Studies   Recent Labs   Lab Test 04/15/22  1028 04/14/22  0528 04/13/22  0446 04/12/22  2221 04/12/22  1627 04/12/22  0550 04/11/22  1732 04/11/22  0417    142 143  --   --  145  --  144   POTASSIUM 3.4* 3.5 3.7 3.4* 3.4* 3.4*   < > 3.4*   CHLORIDE 102 105 108*  --   --  111*  --  113*   CO2 30 28 26  --   --  21*  --  19*   BUN 29* 25 26  --   --  26  --   29*   CR 0.95 0.91 1.10  --   --  1.23*  --  1.29*   GFRESTIMATED 61 65 51*  --   --  45*  --  43*    < > = values in this interval not displayed.       Hepatic Studies    Recent Labs   Lab Test 01/12/22  1114 07/29/21  1219 06/02/21  1710 02/21/21  0622 02/20/21  2040 01/21/21  1404 10/17/20  0605   BILITOTAL  --  0.6 0.4 0.4 0.5 0.9 0.5   ALKPHOS 51 66 73 50 56 63 50   ALBUMIN  --  3.9 3.3* 3.2* 3.7 3.6 2.9*   AST  --  23 23 20 21 22 16   ALT  --  12 <9 <9 11 12 9       Most Recent 6 Bacteria Isolates From Any Culture (See EPIC Reports for Culture Details):No lab results found.    Urine Studies    Recent Labs   Lab Test 04/11/22 2009 03/09/21  1416 02/21/21  1650 10/21/20  1107 10/16/20  1936 03/01/19  0654   LEUKEST Negative Negative Large* Negative Negative Small*   WBCU <1  --  10-25*  --   --  5-10*       Vancomycin Levels  No lab results found.    Invalid input(s): VANCO    Hepatitis B Testing No lab results found.  Hepatitis C Testing   No results found for: HCVAB, HQTG, HCGENO, HCPCR, HQTRNA, HEPRNA  HIVTesting No lab results found.    Respiratory Virus Testing    No results found for: RS, FLUAG  COVID-19 Antibody Results, Testing for Immunity    COVID-19 Antibody Results, Testing for Immunity   No data to display.         COVID-19 PCR Results    COVID-19 PCR Results 1/18/21 3/23/21 3/30/21 1/3/22 1/3/22 4/7/22 4/14/22       [id number]     COVID-19 Virus by PCR (External Result) Not Detected Not Detected Not Detected       SARS CoV2 PCR    Testing sent to reference lab. Results will be returned via unsolicited result  Negative Negative   COVID-19 Virus PCR - Result     NOT DETECTED        Comments are available for some flowsheets but are not being displayed.

## 2022-04-16 NOTE — PLAN OF CARE
Pt eating, drinking, voiding and has had 2 BMs today.    Tramadol given for coccyx pain with good results.     Problem: Plan of Care - These are the overarching goals to be used throughout the patient stay.    Goal: Plan of Care Review/Shift Note  Description: The Plan of Care Review/Shift note should be completed every shift.  The Outcome Evaluation is a brief statement about your assessment that the patient is improving, declining, or no change.  This information will be displayed automatically on your shift note.  Outcome: Ongoing, Progressing  Goal: Optimal Comfort and Wellbeing  Outcome: Ongoing, Progressing  Intervention: Monitor Pain and Promote Comfort  Recent Flowsheet Documentation  Taken 4/16/2022 1352 by Tamika Obrien RN  Pain Management Interventions: medication (see MAR)  Taken 4/16/2022 0830 by Tamika Obrien RN  Pain Management Interventions: (lotion)   medication (see MAR)   repositioned     Problem: COPD (Chronic Obstructive Pulmonary Disease) Comorbidity  Goal: Maintenance of COPD Symptom Control  Outcome: Ongoing, Progressing    Intervention: Maintain COPD-Symptom Control  Recent Flowsheet Documentation  Taken 4/16/2022 1230 by Tamika Obrien RN  Medication Review/Management: medications reviewed  Taken 4/16/2022 0830 by Tamika Obrien RN  Medication Review/Management: medications reviewed     Problem: Infection  Goal: Absence of Infection Signs and Symptoms  Outcome: Ongoing, Progressing  Intervention: Prevent or Manage Infection  Recent Flowsheet Documentation  Taken 4/16/2022 1230 by Tamika Obrien RN  Isolation Precautions:   airborne precautions maintained   contact precautions maintained  Taken 4/16/2022 0830 by Tamika Obrien RN  Isolation Precautions:   airborne precautions maintained   contact precautions maintained       Goal Outcome Evaluation:      Continue to monitor VS, labs, pain level, respiratory status and activity tolerance.

## 2022-04-16 NOTE — PROGRESS NOTES
[facility]    Progress Note - Hospitalist Service       Date of Admission:  4/7/2022    Assessment & Plan    77-year-old female admitted on 4/7 with PMH significant for HFrEF, HTN, factor V Leiden on Xarelto, CKD, osteoporosis, and 35+ pound unintentional weight loss in the past 1 year who was admitted on 4/7/2022 with shortness of breath and was found to have radiographic evidence consistent with pneumonia, in the setting of severe COPD, noted to be c-diff positive during admission. Transitioned to oral antibiotics on 4/12, 4/14 worsening pulmonary status. 4/14 started on Levaquin PO. Increased oxygen needs overnight, anxiety, increased WBC today, but CRP improved.     Acute Hypoxic Respiratory Failure  Community-acquired left mid and upper lobe pneumonia  Left cavitary lesion on chest CT  COPD Exacerbation  Elevated lactate, improved  Leukocytosis, Improving  Admitted with left upper and mid lobe pneumonia with COPD exacerbation. Has had frequent outpatient antibiotics and steroid tapers. Was recommended to use oxygen 2.5 L at home, but only uses occasional. OP pulmonologist at [facility]. CT Chest 4/8 showed moderate upper lobe consolidation. CXR 4/10 showed continued pneumonia, and WBC, CRP consistently elevated. C-diff+ 4/12. WBC and CRP improved after treatment for c-diff initiated. 4/14 CXR showed no improvement. 4/15 Chest CT showing: new cavity formation involving the left upper lobe pneumonia with a 5 cm cavity now present within the densely consolidated lung. ID consulted, pulmonary re-consulted. WBC, CRP improved today. Concern for abscess, cannot rule out malignancy vs other at this point. MRSA swab negative, cryto negative.   - Pulmonology consulted, appreciate recs, signed off 4/12->Reconsulted 4/15.     - Bronchoscopy would be ideal, but high risk for complication and poor lung reserve, possibility of need vent is high  risk  - Consult ID for new cavitation, appreciate  recs   - IV Meropenem    - TB, Fungal, workup, broad differential   - Urine TB quant   - Airborne precautions    - Immediate family allowed to visit. Discussed with Nursing Supervisor, Dr. Travis, and daughter Leelee.  - Sputum culture needs to be collected   - Prednisone taper 30 mg, 20 mg, 10 mg, 5 mg for 3 days each  - Scheduled Duonebs Q4hrs with Mucomyst  - PRN Albuterol Nebs Q2Hrs  - Hold Trelegy Ellipta    - BMP, CBC, CRP daily  - Repeat imaging 4-6 weeks at discharge    Anti-Infective's  Ceftriaxone 1 G 4/7  Doxycyline IV 4/7-4/12->PO Doxycycline 4/13  Zosyn IV 4/7-4/12->Augmentin 4/12-4/13  Levaquin 750 mg PO 4/14, then discontinued  Zosyn 4/15, then discontinued  Meropenem 1 G 4/15->  PO Vanco C-diff 4/12->    CT Chest: 4/8/22  IMPRESSION:  1.  Moderate left upper lobe consolidation. This is consistent with pneumonia. Recommend a follow-up chest radiograph in 4-6 weeks to document resolution.  CT Chest: 4/16/22  1. There is new cavity formation involving the left upper lobe pneumonia with a 5 cm cavity now present within the densely consolidated lung.    Anxiety  Panic attack  Has had frequent episodes of anxiety overnight. Felt like she had a panic attack and claustrophobia overnight. Was given Ativan 0.5 mg x 1 and was able to sleep, found it helpful. She received Olanzapine x 1 during a delirium episode early in the admission which was also helpful. She would like to start something for anxiety while in the hospital. Do to prolonged QT with Vtach episode, limited in anti anxiety medication options. Ativan PO 0.5 mg given last night, and was helpful.   - Call daughter Leelee, she will attempt to calm her down  - Continue integrative therapies     Afib w/ RVR on Xarelto  Tachycardia  Prolonged QTC  Hx TIA, Factor V Leiden  Vtach  Metoprolol was initially on hold for hypotension, now resumed. Did have episode of Afib w RVR to 160's day 1 after Metoprolol was held for hypotension. No further Vtach at this  point.   - Continuous telemetry  - Avoid QT prolonging medications  -Trend and replete electrolytes as needed, K goal >4.0, Mg goal >2.5  - Continue PTA Metoprolol  - Continue Xarelto as at home  - EKG to check QT PRN    C-diff Positive  Diarrhea  Patient began having loose stools during admission. C-diff+ 4/11/22. Still weak from frequent stools.   - Vanco  mg QID x 10 days (4/12-)  - Continue probiotic BID    HFrEF   HTN  PTA took furosemide 80 mg total daily.  Received IV Lasix 20 mg 4/10.  Echo showed reduced EF from prior.  Hyperinflated lungs without radiographic evidence to suggest heart failure exacerbation on CXR 4/10.  Could consider stress test prior to discharge pending clinical improvement.  - Continue Lasix 20 mg daily to help with frequent urination (vs PTA 40 mg)  - Continue PTA Metoprolol to 75 mg XL daily   - Daily weights  - Strict I/O, pure wick if patient tolerates    Delirium, Resolved  Agitation  Gabapentin was decreased to 200 mg twice daily.  Vesicare and Myrbetriq were discontinued.  Patient's room was changed to be closer to nursing station.  No indication for one-to-one at this time.  Reevaluate daily.  - Delirium precautions  - Bundle cares  - Continue melatonin 5 mg at bedtime  - Open blinds, turn on lights during the day    Weight loss  Multifactorial in the setting of COPD, decreased oral intake  -RD consulted, appreciate recommendations and assistance     ANATOLIY on CKD, Improved  Admission Cr 2.44 BUN 36 with a variable baseline 1.5-2.0.    - Daily BMP  - Avoid nephrotoxins    Overactive bladder  Discontinued PTA Mirabegron 50 mg and Solifenacin 5 mg in the setting of acute delirium.  -Pure wick if patient tolerates    Fragile skin  -Wrap IV, avoid adhesives  -WOC  - Calmoseptine PRN    Goals of Care  DNR/DNI  Overall, poor long term prognosis. Has severe COPD with frequent recurrent infections. Patient expressed not wanting to return to hospital. Daughter, Leelee, is helping with  "her healthcare management.   - Palliative care consulted, appreciate recs, signed off 4/14  - DNR/DNI    Chronic Conditions and Medications  Breast Cancer  DCIS, ER/MS positive, HER-2 negative, s/p left breast lumpectomy and left axillary sentinel lymph node biopsy.  - Cont. PTA Tamoxifen 20 daily  Osteoporosis  - Hold PTA VitD and Ca supplementation  Allergies  - Hold PTA Cetirizine and Flonase  GERD  - Cont. PTA 20 mg pepcid at bedtime  Normocytic normochromic anemia, stable  Admission Hgb of 10.9.  Hgbs 13.3 - 9.8 in the 2 months PTA. No ssx GI bleed.  -CBC as above  Chronic Pain secondary to T12 compression fracture  - Continue gabapentin 200 mg BID (dose decreased this admission)  - Continue Tramadol 50 mg Q12h as at home     Diet: Snacks/Supplements Adult: Ensure Enlive; With Meals  Regular Diet Adult    DVT Prophylaxis: Xarelto  Hayes Catheter: Not present  Fluids: PO  Central Lines: PRESENT  PICC Double Lumen 04/11/22 Right Brachial vein medial-Site Assessment: WDL  Cardiac Monitoring: ACTIVE order. Indication: QTc prolonging medication (48 hours)  Code Status: No CPR- Do NOT Intubate      Disposition Plan   Expected Discharge: 2-3 days   Anticipated discharge location: home with family;home with help/services       The patient's care was discussed with the Attending Physician, Dr. Janae Perez.    Discussed with Leelee on the phone.     Irene Lombardo MD   [facility] Residency Service  [facility]  _______________________________________________________    Interval History    She feels \"a hair better\" today in her breathing, but having a lot of anxiety around the situation. She is worried about prognosis and feels overwhelmed. Her bottom is also sore from the diarrhea. She was tearful for a moment when talking.     Physical Exam   Vital Signs: Temp: 97.7  F (36.5  C) Temp src: Oral BP: (!) 155/69 Pulse: 71   Resp: 20 SpO2: 97 % O2 Device: Nasal cannula Oxygen Delivery: 3 LPM  Weight: 111 " lbs 1.6 oz  Physical Exam   Constitutional: Awake, alert, cooperative, oriented to person, place, and day. Appears anxious  Eyes: Lids and lashes normal, extra ocular muscles intact, sclera clear, conjunctiva normal.  ENT: Normocephalic, without obvious abnormality, atraumatic  Lungs:  LS CTA, diminished throughout, poor air exchange   Cardiovascular: Regular rate and rhythm, normal S1 and S2, +JOHN PAUL  Abdomen: Soft, non tender, no distention or guarding  Musculoskeletal: Able to ambulate  Neurologic: Awake and alert. Cranial nerves II-XII are grossly intact.  Neuropsychiatric: Normal affect, mood, orientation, memory seems poor  Skin: Very fragile, bruising, sore bottom      Data   Recent Labs   Lab 04/16/22  0636 04/15/22  2009 04/15/22  1028 04/14/22  0528   WBC 20.1*  --  25.8* 19.3*   HGB 9.8*  --  11.4* 10.3*   MCV 86  --  85 83     --  331 310     --  141 142   POTASSIUM 4.2 4.6 3.4* 3.5   CHLORIDE 105  --  102 105   CO2 30  --  30 28   BUN 23  --  29* 25   CR 0.88  --  0.95 0.91   ANIONGAP 6  --  9 9   MESSI 7.6*  --  8.3* 7.9*   GLC 73  --  104 97     Recent Results (from the past 24 hour(s))   CT Chest w/o Contrast    Narrative    EXAM: CT CHEST W/O CONTRAST  LOCATION: [facility]  DATE/TIME: 4/15/2022 1:21 PM    INDICATION: Pneumonia, effusion or abscess suspected, xray done  COMPARISON: CT 4/8/2022  TECHNIQUE: CT chest without IV contrast. Multiplanar reformats were obtained. Dose reduction techniques were used.  CONTRAST: None.    FINDINGS:   LUNGS AND PLEURA: There is now prominent cavity within the consolidated focus left upper lobe. Cavity measures approximately 5 cm with surrounding thick wall and lung consolidation surrounding fat. Mild increase in overall size of left upper lobe   involvement.    Tiny left pleural effusion now present. Minimal linear density at lung bases. Slight mucus plugging seen posterior right lower lobe. Prominent diffuse changes of  emphysema.    MEDIASTINUM/AXILLAE: Small reactive lymph nodes are stable. Heavy atherosclerotic calcifications of aorta.    CORONARY ARTERY CALCIFICATION: Moderate.    UPPER ABDOMEN: Diffuse atherosclerotic calcifications.    MUSCULOSKELETAL: Scoliosis and severe compression of T12 unchanged      Impression    IMPRESSION:   1.  There is new cavity formation involving the left upper lobe pneumonia with a 5 cm cavity now present within the densely consolidated lung.  2.  Diffuse emphysema unchanged.       Medications     - MEDICATION INSTRUCTIONS -         famotidine  20 mg Oral At Bedtime     fluticasone  1 spray Both Nostrils Daily     fluticasone-vilanterol  1 puff Inhalation At Bedtime    And     umeclidinium  1 puff Inhalation At Bedtime     furosemide  20 mg Oral Daily     gabapentin  200 mg Oral QAM     gabapentin  300 mg Oral At Bedtime     guaiFENesin  1,200 mg Oral BID     ipratropium - albuterol 0.5 mg/2.5 mg/3 mL  3 mL Nebulization 4x daily     lactobacillus rhamnosus (GG)  1 capsule Oral BID     melatonin  5 mg Oral At Bedtime     meropenem  1 g Intravenous Q8H     metoprolol succinate ER  75 mg Oral At Bedtime     multivitamin RENAL  1 tablet Oral Daily     predniSONE  20 mg Oral Daily    Followed by     [START ON 4/19/2022] predniSONE  10 mg Oral Daily    Followed by     [START ON 4/22/2022] predniSONE  5 mg Oral Daily     rivaroxaban ANTICOAGULANT  15 mg Oral At Bedtime     simvastatin  40 mg Oral At Bedtime     sodium chloride  3 mL Nebulization 4x daily     tamoxifen  20 mg Oral QPM     traMADol  50 mg Oral BID     vancomycin  125 mg Oral 4x Daily

## 2022-04-16 NOTE — PROGRESS NOTES
INFECTIOUS DISEASE FOLLOW UP NOTE      ASSESSMENT:  Cavitary pneumonia. Rapid progression into cavitation in hospital while on Zosyn. Radiographic changes may not necessarily equate to antibiotic failure -- likely strep, anaerobes, sometimes gram negatives.   With elevated procalcitonin and significant leukocytosis and poor dentition this suggests polymicrobial bacterial infection  However what is concerning is progressive decline over many months and 40 pound weight loss which could signal either malignancy or chronic infection such as mycobacterial or fungal, with mediastinal lymphadenopathy visualized on CT chest.     PLAN:  On meropenem  Checking for TB, in isolation pending this  Oral vancomycin for c diff.     I spoke with her daughter after the visit -- she was turned away due to isolation precautions today, as were other members of the family. She and other members of the family have been caring for her at home, so understanding the risks of transmission if this is TB. Understands that her mother may not have a long time to live such as up to a year, and feels strongly about having family be able to visit with her now. She and her father, her children have be living with her.     Overall suspicion for TB is not high by my view, so will see if family can visit with masks. It will take some time for AFB testing to return, and having her without family visiting over several days will be difficult. I advocate for having her daughter and  visit if masks are worn tomorrow. Discussed with Dr. Lombardo after the visit.     Emir Travis MD  [facility]  [phone] [facility] paging    ______________________________________________________________________    SUBJECTIVE / INTERVAL HISTORY: feeling somewhat better. Coughs, some phlegm, difficult to expectorate. Temps ok. Diarrhea ongoing.     No known TB exposures. Previous travel to [place] years ago.     She asked that I  "call her daughter.     ROS: deconditioned. All other systems negative except as listed above.        OBJECTIVE:  /60 (BP Location: Left arm)   Pulse 75   Temp 97.5  F (36.4  C) (Oral)   Resp 18   Ht 1.499 m (4' 11\")   Wt 50.4 kg (111 lb 1.6 oz)   SpO2 94%   BMI 22.44 kg/m         Vital Signs  Temp: 97.5  F (36.4  C)  Temp src: Oral  Resp: 18  Pulse: 75  Pulse Rate Source: Monitor  BP: 134/60  BP Location: Left arm    Temp (24hrs), Av.5  F (36.4  C), Min:97.2  F (36.2  C), Max:98.1  F (36.7  C)      GEN: No acute distress.  NC O2.   RESPIRATORY:  Normal breathing pattern. Decreased left side  CARDIOVASCULAR:  Regular rate and rhythm. Normal S1 and S2. No murmur, click, gallop or rub.  ABDOMEN:  Soft, normal bowel sounds, non-tender, no masses.  EXTREMITIES: No edema.  SKIN/HAIR/NAILS:  No rashes. Ecchymosis.   IV: peripheral        Antibiotics:  Meropenem 4/15-  Vancomycin PO -    previous  Pip/tazo -  Augmentin -  Doxy -  Levofloxacin     Pertinent labs:    Recent Labs   Lab 22  0636 04/15/22  1028 22  0528   WBC 20.1* 25.8* 19.3*   HGB 9.8* 11.4* 10.3*   HCT 29.7* 34.0* 30.6*    331 310        Recent Labs   Lab 22  0636 04/15/22  1028 22  0528    141 142   CO2 30 30 28   BUN 23 29* 25        Lab Results   Component Value Date    CRP 8.7 (H) 2022    CRP 8.9 (H) 04/15/2022    CRP 10.1 (H) 2022       Lab Results   Component Value Date    ALT 12 2021    AST 23 2021    ALKPHOS 51 2022         MICROBIOLOGY DATA:  c diff positive   Blood negative ,  sputum mixedflora on gram stain -- culture not done due to squamous epi cells    sputum  AFB pending  Crypto negative  aspergillus pending    RADIOLOGY:  NM Lung Scan Perfusion Particulate    Result Date: 2022  EXAM: NM LUNG SCAN PERFUSION PARTICULATE LOCATION: [facility] DATE/TIME: 2022 5:18 PM INDICATION: PE " suspected, high prob COMPARISON: Chest x-ray 2022, V/Q scan 2020, CT chest exam 2019 TECHNIQUE: 7.9 mCi technetium-99m MAA, IV. Standard lung perfusion imaging. FINDINGS: Heterogeneous perfusion throughout the left lung with numerous small subsegmental perfusion defects similar to the previous VQ scan. Perfusion to the right lung is more homogeneous, with a few subtle areas of decreased activity also similar to the prior exam. Chest x-ray from today demonstrates emphysema with diffuse opacities in the left upper and mid left lung suggestive of pneumonia.     IMPRESSION: 1.  Chronic perfusion abnormalities throughout both lungs similar to the previous exam. No new perfusion abnormalities. Recent chest x-ray is suggestive of left mid and upper lung pneumonia.    Echocardiogram Complete    Result Date: 2022  [id number]^TEREZA^GERALD^[place] [address]  Name: JUDY ZARCO MRN: [id number] : 1945 Study Date: 2022 02:58 PM Age: 77 yrs Gender: Female Patient Location: [facility] Reason For Study: Syncope Ordering Physician: GERALD STARKS Performed By: SUGEY  BSA: 1.4 m2 Height: 59 in Weight: 112 lb ______________________________________________________________________________ Procedure Complete Portable Echo Adult. ______________________________________________________________________________ Interpretation Summary  1. The left ventricle is normal in size. Left ventricular systolic performance is moderately reduced. The ejection fraction is estimated to be 35-40%. 2. There is moderate global reduction in left ventricular systolic performance. 3. There is abnormal septal motion possibly related to altered electrical activation due to bundle branch block. 4. There is mild-moderate, to perhaps moderate, tricuspid insufficiency. 5. Normal right ventricular size with mildly reduced right ventricular systolic performance. 6.  There is mild right atrial enlargement. 7. Right ventricular systolic pressure relative to right atrial pressure is mildly increased. The pulmonary artery pressure is estimated to be 45-50 mmHg plus right atrial pressure (the IVC is mildly dilated).  When compared to the prior real-time echocardiogram dated 28 July 2021, there has been a mild further diminution in left ventricular systolic performance (it is this reader's impression that left ventricular systolic performance was mild-moderately reduced with an ejection fraction 40-45% on the prior examination). ______________________________________________________________________________ Left ventricle: The left ventricle is normal in size. Left ventricular systolic performance is moderately reduced. The ejection fraction is estimated to be 35-40%. There is moderate global reduction in left ventricular systolic performance. There is abnormal septal motion possibly related to altered electrical activation due to bundle branch block. Left ventricular wall thickness is normal. 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  Assessment of left atrial pressure (LAP): The cumulative findings are indeterminate in evaluating left atrial pressure.  Right ventricle: Normal right ventricular size with mildly reduced right ventricular systolic performance.  Left atrium: There is borderline left atrial enlargement.  Right atrium: There is mild right atrial enlargement.  IVC: The IVC is mildly dilated.  Aortic valve: The aortic valve is comprised of three cusps.  Mitral valve: There is mild mitral annular calcification. There is trace mitral insufficiency.  Tricuspid valve: The tricuspid valve is grossly morphologically normal. There is mild-moderate, to perhaps moderate, tricuspid insufficiency.  Pulmonic valve: The pulmonic valve is grossly morphologically normal.  Thoracic aorta: The aortic root and proximal ascending aorta are of normal dimension.   Pericardium: There is no significant pericardial effusion. ______________________________________________________________________________ ______________________________________________________________________________ MMode/2D Measurements & Calculations RVDd: 3.5 cm IVSd: 0.98 cm LVIDd: 4.1 cm LVIDs: 3.3 cm LVPWd: 0.90 cm FS: 19.4 % LV mass(C)d: 122.9 grams LV mass(C)dI: 85.3 grams/m2 Ao root diam: 2.8 cm LA dimension: 3.5 cm asc Aorta Diam: 2.8 cm LA/Ao: 1.2 LVOT diam: 1.8 cm LVOT area: 2.6 cm2  LA Volume Indexed (AL/bp): 28.8 ml/m2 RWT: 0.44  Time Measurements MM HR: 98.0 BPM  Doppler Measurements & Calculations MV E max carolyn: 89.4 cm/sec MV A max carolyn: 135.9 cm/sec MV E/A: 0.66 MV dec time: 0.12 sec LV V1 max PG: 3.2 mmHg LV V1 max: 89.2 cm/sec LV V1 VTI: 18.3 cm SV(LVOT): 48.2 ml SI(LVOT): 33.4 ml/m2 TR max carolyn: 348.4 cm/sec TR max P.6 mmHg E/E' av.9 Lateral E/e': 11.0 Medial E/e': 16.8  ______________________________________________________________________________ Report approved by: Deepak Anderson 2022 04:24 PM       US Lower Extremity Venous Duplex Bilateral    Result Date: 2022  EXAM: US LOWER EXTREMITY VENOUS DUPLEX BILATERAL LOCATION: [facility] DATE/TIME: 2022 3:49 PM INDICATION: hypoxia, hypotension; leg swelling COMPARISON: None. TECHNIQUE: Venous Duplex ultrasound of bilateral lower extremities with and without compression, augmentation and duplex. Color flow and spectral Doppler with waveform analysis performed. FINDINGS: Exam includes the common femoral, femoral, popliteal veins as well as segmentally visualized deep calf veins and greater saphenous vein. RIGHT: No deep vein thrombosis. No superficial thrombophlebitis. No popliteal cyst. LEFT: No deep vein thrombosis. No superficial thrombophlebitis. No popliteal cyst.     IMPRESSION: 1.  No deep venous thrombosis in the bilateral lower extremities.    XR Chest Port 1 View    Result Date:  4/14/2022  EXAM: XR CHEST PORT 1 VIEW LOCATION: [facility] DATE/TIME: 4/14/2022 10:22 AM INDICATION: Worsening shortness of breath, coarse breath sounds. COMPARISON: 04/10/2022. Others.     IMPRESSION: Persistent dense left upper lobe consolidation. The surrounding opacities are stable to perhaps marginally improved, though the dense consolidative abnormality is without substantial change. Increased lucency superior to the consolidation could be from aerated lung or developing cavitation. Recommend continued follow-up. Small left pleural effusion. Right lung is grossly clear. Stable cardiomediastinal silhouette. Atherosclerosis. Right PICC tip near the distal SVC. NOTE: ABNORMAL REPORT THE DICTATION ABOVE DESCRIBES AN ABNORMALITY FOR WHICH FOLLOW-UP IS NEEDED.     XR Chest Port 1 View    Result Date: 4/10/2022  EXAM: XR CHEST PORTABLE 1 VIEW LOCATION: [facility] DATE/TIME: 04/10/2022, 7:56 AM INDICATION: Shortness of breath. COMPARISON: CT of the chest, abdomen, and pelvis performed 04/08/2022. Chest radiograph performed 04/07/2022.     IMPRESSION: Ill-defined consolidation and infiltrate in the left upper lobe is again noted. Mild hyperinflation both lungs. The right lung is otherwise clear. Tortuous calcified thoracic aorta. Heart size has increased in prominence compared to 04/07/2022. Pulmonary vascularity is within normal limits where seen. Old right rib fractures. Right PICC line has been removed.     XR Chest Port 1 View    Result Date: 4/7/2022  EXAM: XR CHEST PORT 1 VIEW LOCATION: [facility] DATE/TIME: 4/7/2022 4:49 PM INDICATION: Cough. COMPARISON: 06/02/2021.     IMPRESSION: New moderate opacity in the left mid to upper lung suggestive of pneumonia given history of cough. However, this requires follow-up to complete resolution to exclude an underlying lesion (6-8 week follow-up radiographs). Right PICC tip near the cavoatrial  junction. Normal heart size. Atherosclerosis. No significant pleural effusion.     CT Chest Abdomen Pelvis w/o Contrast    Result Date: 4/8/2022  EXAM: CT CHEST ABDOMEN PELVIS W/O CONTRAST LOCATION: [facility] DATE/TIME: 4/8/2022 2:17 PM INDICATION: History of breast cancer. Pneumonia. Weight loss. COMPARISON: CT chest 6/12/2020, CT chest/abdomen/pelvis 6/11/2019 TECHNIQUE: CT scan of the chest, abdomen, and pelvis was performed without IV contrast. Multiplanar reformats were obtained. Dose reduction techniques were used. CONTRAST: None. FINDINGS: LUNGS AND PLEURA: Moderate irregular consolidation in the posterior left upper lobe. Confluent emphysema. Mild bibasilar atelectasis/scar. Trace pleural effusions. Stable mild diffuse bronchial wall thickening. A few stable benign pulmonary nodules. MEDIASTINUM/AXILLAE: Right PICC. Mediastinal lymphadenopathy with the largest largest being a right paratracheal node measuring 1.6 cm in short axis, image 48:3. Small pericardial effusion. The main pulmonary artery is enlarged at 3.0 cm which may be seen with pulmonary artery hypertension. CORONARY ARTERY CALCIFICATION: Moderate. HEPATOBILIARY: Normal. PANCREAS: Normal. SPLEEN: Normal. ADRENAL GLANDS: Normal. KIDNEYS/BLADDER: Low-lying right kidney. Subcentimeter renal hypodensities which are too small to characterize. No hydronephrosis. BOWEL: No obstruction or inflammatory change. Normal appendix. LYMPH NODES: Normal. VASCULATURE: Stable ectasia of the abdominal aorta measuring 2.9 x 2.3 cm, image 169:3. Moderate atherosclerosis. PELVIC ORGANS: Normal. MUSCULOSKELETAL: Left hip arthroplasty. Osseous demineralization. Degenerative changes of the spine. Remote left rib fractures. T12 compression fracture with 80% loss of height which is likely remote.     IMPRESSION: 1.  Moderate left upper lobe consolidation. This is consistent with pneumonia. Recommend a follow-up chest radiograph in 4-6 weeks to  document resolution. 2.  Mediastinal lymphadenopathy. 3.  No acute abnormality in the abdomen or pelvis.    CT Chest w/o Contrast    Result Date: 4/15/2022  EXAM: CT CHEST W/O CONTRAST LOCATION: [facility] DATE/TIME: 4/15/2022 1:21 PM INDICATION: Pneumonia, effusion or abscess suspected, xray done COMPARISON: CT 4/8/2022 TECHNIQUE: CT chest without IV contrast. Multiplanar reformats were obtained. Dose reduction techniques were used. CONTRAST: None. FINDINGS: LUNGS AND PLEURA: There is now prominent cavity within the consolidated focus left upper lobe. Cavity measures approximately 5 cm with surrounding thick wall and lung consolidation surrounding fat. Mild increase in overall size of left upper lobe involvement. Tiny left pleural effusion now present. Minimal linear density at lung bases. Slight mucus plugging seen posterior right lower lobe. Prominent diffuse changes of emphysema. MEDIASTINUM/AXILLAE: Small reactive lymph nodes are stable. Heavy atherosclerotic calcifications of aorta. CORONARY ARTERY CALCIFICATION: Moderate. UPPER ABDOMEN: Diffuse atherosclerotic calcifications. MUSCULOSKELETAL: Scoliosis and severe compression of T12 unchanged     IMPRESSION: 1.  There is new cavity formation involving the left upper lobe pneumonia with a 5 cm cavity now present within the densely consolidated lung. 2.  Diffuse emphysema unchanged.

## 2022-04-16 NOTE — PROGRESS NOTES
"/60 (BP Location: Left arm)   Pulse 79   Temp 97.5  F (36.4  C) (Oral)   Resp 18   Ht 1.499 m (4' 11\")   Wt 50.4 kg (111 lb 1.6 oz)   SpO2 98%   BMI 22.44 kg/m      Patient is on duoneb and sodium chloride 4 times a day. Patient doing metaneb therapy. Breath sounds improved. Was able to send a small sample of sputum. Patient on 2L NC. RT continue to follow.     Bianca Alonso, RT      "

## 2022-04-17 LAB
ANION GAP SERPL CALCULATED.3IONS-SCNC: 9 MMOL/L (ref 5–18)
BASOPHILS # BLD AUTO: 0 10E3/UL (ref 0–0.2)
BASOPHILS NFR BLD AUTO: 0 %
BUN SERPL-MCNC: 20 MG/DL (ref 8–28)
C REACTIVE PROTEIN LHE: 7 MG/DL (ref 0–0.8)
CALCIUM SERPL-MCNC: 8.2 MG/DL (ref 8.5–10.5)
CHLORIDE BLD-SCNC: 104 MMOL/L (ref 98–107)
CO2 SERPL-SCNC: 28 MMOL/L (ref 22–31)
CREAT SERPL-MCNC: 0.94 MG/DL (ref 0.6–1.1)
EOSINOPHIL # BLD AUTO: 0.1 10E3/UL (ref 0–0.7)
EOSINOPHIL NFR BLD AUTO: 0 %
ERYTHROCYTE [DISTWIDTH] IN BLOOD BY AUTOMATED COUNT: 14.8 % (ref 10–15)
GALACTOMANNAN AG SERPL QL IA: NEGATIVE
GALACTOMANNAN AG SPEC IA-ACNC: 0.4
GFR SERPL CREATININE-BSD FRML MDRD: 62 ML/MIN/1.73M2
GLUCOSE BLD-MCNC: 71 MG/DL (ref 70–125)
HCT VFR BLD AUTO: 34.4 % (ref 35–47)
HGB BLD-MCNC: 11.2 G/DL (ref 11.7–15.7)
IMM GRANULOCYTES # BLD: 0.4 10E3/UL
IMM GRANULOCYTES NFR BLD: 2 %
LACTATE SERPL-SCNC: 1.1 MMOL/L (ref 0.7–2)
LYMPHOCYTES # BLD AUTO: 2.2 10E3/UL (ref 0.8–5.3)
LYMPHOCYTES NFR BLD AUTO: 9 %
MAGNESIUM SERPL-MCNC: 2.1 MG/DL (ref 1.8–2.6)
MCH RBC QN AUTO: 28.2 PG (ref 26.5–33)
MCHC RBC AUTO-ENTMCNC: 32.6 G/DL (ref 31.5–36.5)
MCV RBC AUTO: 87 FL (ref 78–100)
MONOCYTES # BLD AUTO: 1.9 10E3/UL (ref 0–1.3)
MONOCYTES NFR BLD AUTO: 8 %
NEUTROPHILS # BLD AUTO: 18.8 10E3/UL (ref 1.6–8.3)
NEUTROPHILS NFR BLD AUTO: 81 %
NRBC # BLD AUTO: 0 10E3/UL
NRBC BLD AUTO-RTO: 0 /100
PHOSPHATE SERPL-MCNC: 3.4 MG/DL (ref 2.5–4.5)
PLATELET # BLD AUTO: 418 10E3/UL (ref 150–450)
POTASSIUM BLD-SCNC: 4.4 MMOL/L (ref 3.5–5)
RBC # BLD AUTO: 3.97 10E6/UL (ref 3.8–5.2)
SODIUM SERPL-SCNC: 141 MMOL/L (ref 136–145)
WBC # BLD AUTO: 23.4 10E3/UL (ref 4–11)

## 2022-04-17 PROCEDURE — 999N000157 HC STATISTIC RCP TIME EA 10 MIN

## 2022-04-17 PROCEDURE — 84100 ASSAY OF PHOSPHORUS: CPT | Performed by: INTERNAL MEDICINE

## 2022-04-17 PROCEDURE — 250N000013 HC RX MED GY IP 250 OP 250 PS 637: Performed by: FAMILY MEDICINE

## 2022-04-17 PROCEDURE — 250N000013 HC RX MED GY IP 250 OP 250 PS 637

## 2022-04-17 PROCEDURE — 86140 C-REACTIVE PROTEIN: CPT | Performed by: STUDENT IN AN ORGANIZED HEALTH CARE EDUCATION/TRAINING PROGRAM

## 2022-04-17 PROCEDURE — 83605 ASSAY OF LACTIC ACID: CPT | Performed by: STUDENT IN AN ORGANIZED HEALTH CARE EDUCATION/TRAINING PROGRAM

## 2022-04-17 PROCEDURE — 99231 SBSQ HOSP IP/OBS SF/LOW 25: CPT | Performed by: INTERNAL MEDICINE

## 2022-04-17 PROCEDURE — 250N000009 HC RX 250: Performed by: FAMILY MEDICINE

## 2022-04-17 PROCEDURE — 99233 SBSQ HOSP IP/OBS HIGH 50: CPT | Performed by: INTERNAL MEDICINE

## 2022-04-17 PROCEDURE — 250N000011 HC RX IP 250 OP 636: Performed by: STUDENT IN AN ORGANIZED HEALTH CARE EDUCATION/TRAINING PROGRAM

## 2022-04-17 PROCEDURE — 250N000009 HC RX 250: Performed by: STUDENT IN AN ORGANIZED HEALTH CARE EDUCATION/TRAINING PROGRAM

## 2022-04-17 PROCEDURE — 87206 SMEAR FLUORESCENT/ACID STAI: CPT | Performed by: INTERNAL MEDICINE

## 2022-04-17 PROCEDURE — 80048 BASIC METABOLIC PNL TOTAL CA: CPT | Performed by: STUDENT IN AN ORGANIZED HEALTH CARE EDUCATION/TRAINING PROGRAM

## 2022-04-17 PROCEDURE — 85025 COMPLETE CBC W/AUTO DIFF WBC: CPT | Performed by: STUDENT IN AN ORGANIZED HEALTH CARE EDUCATION/TRAINING PROGRAM

## 2022-04-17 PROCEDURE — 250N000013 HC RX MED GY IP 250 OP 250 PS 637: Performed by: STUDENT IN AN ORGANIZED HEALTH CARE EDUCATION/TRAINING PROGRAM

## 2022-04-17 PROCEDURE — 94799 UNLISTED PULMONARY SVC/PX: CPT

## 2022-04-17 PROCEDURE — 94640 AIRWAY INHALATION TREATMENT: CPT | Mod: 76

## 2022-04-17 PROCEDURE — 250N000012 HC RX MED GY IP 250 OP 636 PS 637

## 2022-04-17 PROCEDURE — 83735 ASSAY OF MAGNESIUM: CPT | Performed by: INTERNAL MEDICINE

## 2022-04-17 PROCEDURE — 99233 SBSQ HOSP IP/OBS HIGH 50: CPT | Mod: GC

## 2022-04-17 PROCEDURE — 120N000001 HC R&B MED SURG/OB

## 2022-04-17 RX ADMIN — IPRATROPIUM BROMIDE AND ALBUTEROL SULFATE 3 ML: 2.5; .5 SOLUTION RESPIRATORY (INHALATION) at 14:02

## 2022-04-17 RX ADMIN — METOPROLOL SUCCINATE 75 MG: 50 TABLET, EXTENDED RELEASE ORAL at 21:14

## 2022-04-17 RX ADMIN — GABAPENTIN 300 MG: 300 CAPSULE ORAL at 21:13

## 2022-04-17 RX ADMIN — VANCOMYCIN HYDROCHLORIDE 125 MG: 125 CAPSULE ORAL at 13:39

## 2022-04-17 RX ADMIN — ACETAMINOPHEN 1000 MG: 500 TABLET, FILM COATED ORAL at 09:00

## 2022-04-17 RX ADMIN — TRAMADOL HYDROCHLORIDE 50 MG: 50 TABLET, COATED ORAL at 06:35

## 2022-04-17 RX ADMIN — FUROSEMIDE 20 MG: 20 TABLET ORAL at 08:56

## 2022-04-17 RX ADMIN — GUAIFENESIN 1200 MG: 600 TABLET ORAL at 08:56

## 2022-04-17 RX ADMIN — B-COMPLEX W/ C & FOLIC ACID TAB 1 MG 1 TABLET: 1 TAB at 08:56

## 2022-04-17 RX ADMIN — RIVAROXABAN 15 MG: 15 TABLET, FILM COATED ORAL at 21:12

## 2022-04-17 RX ADMIN — SODIUM CHLORIDE SOLN NEBU 3% 3 ML: 3 NEBU SOLN at 07:32

## 2022-04-17 RX ADMIN — IPRATROPIUM BROMIDE AND ALBUTEROL SULFATE 3 ML: 2.5; .5 SOLUTION RESPIRATORY (INHALATION) at 17:22

## 2022-04-17 RX ADMIN — VANCOMYCIN HYDROCHLORIDE 125 MG: 125 CAPSULE ORAL at 21:13

## 2022-04-17 RX ADMIN — VANCOMYCIN HYDROCHLORIDE 125 MG: 125 CAPSULE ORAL at 08:56

## 2022-04-17 RX ADMIN — GABAPENTIN 200 MG: 100 CAPSULE ORAL at 06:34

## 2022-04-17 RX ADMIN — TAMOXIFEN CITRATE 20 MG: 10 TABLET ORAL at 21:15

## 2022-04-17 RX ADMIN — SIMVASTATIN 40 MG: 40 TABLET, FILM COATED ORAL at 21:14

## 2022-04-17 RX ADMIN — PREDNISONE 20 MG: 20 TABLET ORAL at 08:56

## 2022-04-17 RX ADMIN — IPRATROPIUM BROMIDE AND ALBUTEROL SULFATE 3 ML: 2.5; .5 SOLUTION RESPIRATORY (INHALATION) at 20:39

## 2022-04-17 RX ADMIN — FAMOTIDINE 20 MG: 20 TABLET ORAL at 21:12

## 2022-04-17 RX ADMIN — IPRATROPIUM BROMIDE AND ALBUTEROL SULFATE 3 ML: 2.5; .5 SOLUTION RESPIRATORY (INHALATION) at 07:32

## 2022-04-17 RX ADMIN — MEROPENEM 1 G: 1 INJECTION, POWDER, FOR SOLUTION INTRAVENOUS at 04:57

## 2022-04-17 RX ADMIN — TRAMADOL HYDROCHLORIDE 50 MG: 50 TABLET, COATED ORAL at 21:11

## 2022-04-17 RX ADMIN — SODIUM CHLORIDE SOLN NEBU 3% 3 ML: 3 NEBU SOLN at 17:23

## 2022-04-17 RX ADMIN — Medication 1 CAPSULE: at 21:14

## 2022-04-17 RX ADMIN — Medication 1 CAPSULE: at 08:56

## 2022-04-17 RX ADMIN — MEROPENEM 1 G: 1 INJECTION, POWDER, FOR SOLUTION INTRAVENOUS at 16:29

## 2022-04-17 RX ADMIN — SODIUM CHLORIDE SOLN NEBU 3% 3 ML: 3 NEBU SOLN at 14:02

## 2022-04-17 RX ADMIN — GUAIFENESIN 1200 MG: 600 TABLET ORAL at 21:12

## 2022-04-17 RX ADMIN — SODIUM CHLORIDE SOLN NEBU 3% 3 ML: 3 NEBU SOLN at 20:39

## 2022-04-17 RX ADMIN — VANCOMYCIN HYDROCHLORIDE 125 MG: 125 CAPSULE ORAL at 17:05

## 2022-04-17 ASSESSMENT — ACTIVITIES OF DAILY LIVING (ADL)
ADLS_ACUITY_SCORE: 11

## 2022-04-17 NOTE — PLAN OF CARE
"  Problem: Plan of Care - These are the overarching goals to be used throughout the patient stay.    Goal: Patient-Specific Goal (Individualized)  Description: You can add care plan individualizations to a care plan. Examples of Individualization might be:  \"Parent requests to be called daily at 9am for status\", \"I have a hard time hearing out of my right ear\", or \"Do not touch me to wake me up as it startles me\".  Outcome: Ongoing, Progressing   Goal Outcome Evaluation:      Patient is alert and oriented, patient is an assist of 1, patient denies pain and has been resting comfortably. Patients lung sounds are diminished and coarse. Patient is congested and has cough.                "

## 2022-04-17 NOTE — PROGRESS NOTES
"Brief Progress Note    S  Received page from patient's nurse Tg SULLIVAN at 5:17 the patient was having left-sided chest pain rated 6/10.  Went to see patient.  Patient reports that she developed an achy pain directly over her left areola after she stretched her arm; has been present for 30 minutes.  Notes that she previously had a lumpectomy and has had this sort of pain intermittently over the past 5 years; this pain feels identical to prior instances. It usually lasts 1.5 hours if uses tramadol.  States that this pain does not radiate to the arm, jaw, or back. Did not have change in her breathing. No leg swelling or pain. Vitals have been stable. No notable changes in breathing.    O  /81 (BP Location: Left arm)   Pulse 67   Temp 97.6  F (36.4  C) (Oral)   Resp 20   Ht 1.499 m (4' 11\")   Wt 50.3 kg (110 lb 12.8 oz)   SpO2 94%   BMI 22.38 kg/m    Exam:  Constitutional: alert, no distress, cooperative and smiling  Head: Normocephalic. No masses, lesions, tenderness or abnormalities  Neck: Carotids without bruits.  Cardiovascular: negative, PMI normal. No lifts, heaves, or thrills. RRR. No murmurs, clicks gallops or rub  Respiratory: Breath sounds somewhat diminished diffusely with scattered crackles, the breath sounds present in all lung fields.  Speaking in full sentences on 5 L NC (unchanged).  Gastrointestinal: Abdomen soft, non-tender. BS normal.  Neurologic: CN grossly intact  Psychiatric: mentation appears normal and affect normal/bright  Musculoskeletal: Tenderness to palpation of left areola focally with pain identical to what patient described experiencing.  Bilateral calf without edema or tenderness to palpation.       A/P  Patient with left-sided chest pain focal to left areola reported to be identical to prior instances of chest wall pain following lumpectomy.  Reproducible chest wall pain over left areola with plapation.  No other concerning symptoms or signs.  Findings not consistent with " typical MI, aortic dissection, pneumothorax, or PE.  Reviewed findings with patient who was also not concerned about this pain; declined work-up at this time which I agree is reasonable.  Patient has baseline LBBB and T wave abnormalities, thus EKG will be of limited use. Discussed plan with patient's nurse.  - Administer PTA tramadol early (will schedule for 8 AM)  - Continue to monitor      Tom Hagan MD PGY-2  [facility]

## 2022-04-17 NOTE — PLAN OF CARE
"  Problem: Plan of Care - These are the overarching goals to be used throughout the patient stay.    Goal: Patient-Specific Goal (Individualized)  Description: You can add care plan individualizations to a care plan. Examples of Individualization might be:  \"Parent requests to be called daily at 9am for status\", \"I have a hard time hearing out of my right ear\", or \"Do not touch me to wake me up as it startles me\".  4/17/2022 0134 by Tg Quinn, NATE  Outcome: Ongoing, Progressing  4/16/2022 2146 by Tg Quinn, RN  Outcome: Ongoing, Progressing   Goal Outcome Evaluation:      Patient is alert and oriented, patient is an assist of 1, patient denies pain and has been resting comfortably. Lung sounds are diminished and coarse. Patient has cough and is congested. Patient complained of chest pain around 5 in the morning, provider notified, patient running normal sinus with a bundle branch block on tele, sepsis protocol fired, and some white spots noted on sides of tongue provider also notified of this.                 "

## 2022-04-17 NOTE — PROGRESS NOTES
"/50 (BP Location: Left arm)   Pulse 81   Temp 97.8  F (36.6  C) (Oral)   Resp 16   Ht 1.499 m (4' 11\")   Wt 50.3 kg (110 lb 12.8 oz)   SpO2 91%   BMI 22.38 kg/m      Patient on duoneb 4 times a day and sodium chloride 4 times a day. Breath sounds clear.diminished. Doing metaneb 3 times a day. Non productive cough. RT continue to follow.     Bianca Alonso, RT      "

## 2022-04-17 NOTE — PROGRESS NOTES
PULMONARY MEDICINE PROGRESS NOTE  04/17/2022    Admit Date: 4/7/2022  CODE: No CPR- Do NOT Intubate    Reason for Consult: acute exacerbation of COPD, lung abscess     Assessment/Plan:     77 year old female with a history of severe asthma-COPD overlap syndrome with hyper-IgE followed at [facility], now in with severe left-sided CAP and AECOPD.  Patient had been started on levofloxacin for her presumed pneumonia.  However, a follow-up CT scan of the chest, her lung consolidation is progressed to a abscess, although malignancy remains on the differential given her significant weight loss prior to admission.  She is also on p.o. vancomycin for C. difficile     CAP, AECOPD: Baseline severe COPD, followed at [facility]. On high-dose triple-therapy and oxygen at baseline. Goal SpO2 88-92%. Now with severe left-sided CAP that has progressed and now has a cavitary lesion. She completed a course of doxycycline and Augmentin on 4/14/2022 and is on steroid taper.  She was started on levofloxacin on 4/14/2022.  Repeat CT scan of the chest showed her pulmonary consolidation in the left upper lobe has progressed to a cavitary lesion.  Differential is broad and concerning for worsening pneumonia with cavitation vs abscess vs malignancy.  CRP mildly elevated on admission and has progressively improved.  Aspergillus galactomannan antigen negative.  MRSA screen negative.  Sputum cultures grew mixed cultures.     Her CT scan of the chest progressed very rapidly over 1 week.  Although malignancy is on the differential, given how rapidly the cavitation progressed, I suspect this is more likely infectious.      Plan:  -Oxygen goal SpO2 88-92%  -Started on IV Zosyn and switched to meropenem by infectious disease  -Ideally, bronchoscopy would be done however after long discussion with daughter, Leelee who is a nurse, she is not eager about bronchoscopy bakari that pt may require mechanical ventilation given her pulm reserve.  -Follow-up sputum for  "Gram stain/culture, sputum for AFB and fungal serology sent by ID  -Bronchodilators  -Continue high-dose fluticasone furoate-umeclidinium-vilanterol (Trelelgy Ellipta) on discharge  -DNR/DNI (has been changed on this admission from full code)    Bernard Newman MD    Had a long discussion with daughter.  She does not want patient to know of any \"serious\" diagnoses and would like to discuss with her first. She favors treatment of pna/abscess now and re-discuss options if no improvement, although not leaning towards diagnostic procedures.                                                                                                                                                        SUBJECTIVE/INTERVAL HISTORY     Feels better. In good spirit. Denies worsening sob.                                                                                                                                                     Exam/Data:     Vitals  BP (!) 149/67 (BP Location: Left arm)   Pulse 78   Temp 98  F (36.7  C) (Oral)   Resp 18   Ht 1.499 m (4' 11\")   Wt 50.3 kg (110 lb 12.8 oz)   SpO2 91%   BMI 22.38 kg/m       I/O last 3 completed shifts:  In: -   Out: 200 [Urine:200]  Weight change: -0.136 kg (-4.8 oz)    EXAM:  BP (!) 149/67 (BP Location: Left arm)   Pulse 78   Temp 98  F (36.7  C) (Oral)   Resp 18   Ht 1.499 m (4' 11\")   Wt 50.3 kg (110 lb 12.8 oz)   SpO2 91%   BMI 22.38 kg/m      Intake/Output last 3 shifts:  I/O last 3 completed shifts:  In: -   Out: 200 [Urine:200]  Intake/Output this shift:  I/O this shift:  In: -   Out: 1050 [Urine:1050]      Gen: alert, oriented  HEENT: NT, no CHRISTINA  CV: RRR, no m/g/r  Resp: faint crackles right > left, diminished  Abd: soft, nontender, BS+  Skin: no rashes or lesions  Ext: no edema   Neuro: PERRL, nonfocal exam    ROS: A complete 10-system review of systems was obtained and is negative with the exception of what is noted in the history of present " illness.    Medications:       - MEDICATION INSTRUCTIONS -         famotidine  20 mg Oral At Bedtime     fluticasone  1 spray Both Nostrils Daily     [Held by provider] fluticasone-vilanterol  1 puff Inhalation At Bedtime    And     [Held by provider] umeclidinium  1 puff Inhalation At Bedtime     furosemide  20 mg Oral Daily     gabapentin  200 mg Oral QAM     gabapentin  300 mg Oral At Bedtime     guaiFENesin  1,200 mg Oral BID     ipratropium - albuterol 0.5 mg/2.5 mg/3 mL  3 mL Nebulization 4x daily     lactobacillus rhamnosus (GG)  1 capsule Oral BID     melatonin  5 mg Oral At Bedtime     meropenem  1 g Intravenous Q12H     metoprolol succinate ER  75 mg Oral At Bedtime     multivitamin RENAL  1 tablet Oral Daily     predniSONE  20 mg Oral Daily    Followed by     [START ON 4/19/2022] predniSONE  10 mg Oral Daily    Followed by     [START ON 4/22/2022] predniSONE  5 mg Oral Daily     rivaroxaban ANTICOAGULANT  15 mg Oral At Bedtime     simvastatin  40 mg Oral At Bedtime     sodium chloride  3 mL Nebulization 4x daily     tamoxifen  20 mg Oral QPM     traMADol  50 mg Oral BID     vancomycin  125 mg Oral 4x Daily         DATA  All laboratory and radiology has been personally reviewed by myself today.  Recent Labs   Lab 04/17/22  0455   WBC 23.4*   HGB 11.2*   HCT 34.4*        Recent Labs   Lab 04/17/22  0455 04/16/22  0636 04/15/22  1028    141 141   CO2 28 30 30   BUN 20 23 29*     PFT DATA (October 2019):  FEV1 0.85 with significant BD response, DLCO 36%     IMAGING:     Ct chest:  IMPRESSION:   1.  There is new cavity formation involving the left upper lobe pneumonia with a 5 cm cavity now present within the densely consolidated lung.  2.  Diffuse emphysema unchanged.      CT C/A/P:  FINDINGS:   LUNGS AND PLEURA: Moderate irregular consolidation in the posterior left upper lobe. Confluent emphysema. Mild bibasilar atelectasis/scar. Trace pleural effusions. Stable mild diffuse bronchial wall  thickening. A few stable benign pulmonary nodules.     MEDIASTINUM/AXILLAE: Right PICC. Mediastinal lymphadenopathy with the largest largest being a right paratracheal node measuring 1.6 cm in short axis, image 48:3. Small pericardial effusion. The main pulmonary artery is enlarged at 3.0 cm which may be   seen with pulmonary artery hypertension.     CORONARY ARTERY CALCIFICATION: Moderate.     HEPATOBILIARY: Normal.     PANCREAS: Normal.     SPLEEN: Normal.     ADRENAL GLANDS: Normal.     KIDNEYS/BLADDER: Low-lying right kidney. Subcentimeter renal hypodensities which are too small to characterize. No hydronephrosis.     BOWEL: No obstruction or inflammatory change. Normal appendix.     LYMPH NODES: Normal.     VASCULATURE: Stable ectasia of the abdominal aorta measuring 2.9 x 2.3 cm, image 169:3. Moderate atherosclerosis.     PELVIC ORGANS: Normal.     MUSCULOSKELETAL: Left hip arthroplasty. Osseous demineralization. Degenerative changes of the spine. Remote left rib fractures. T12 compression fracture with 80% loss of height which is likely remote.                                                                      IMPRESSION:  1.  Moderate left upper lobe consolidation. This is consistent with pneumonia. Recommend a follow-up chest radiograph in 4-6 weeks to document resolution.  2.  Mediastinal lymphadenopathy.  3.  No acute abnormality in the abdomen or pelvis.

## 2022-04-17 NOTE — PROGRESS NOTES
[facility]    Progress Note - Hospitalist Service       Date of Admission:  4/7/2022    Assessment & Plan    77-year-old female admitted on 4/7 with PMH significant for HFrEF, HTN, factor V Leiden on Xarelto, CKD, osteoporosis, and 35+ pound unintentional weight loss in the past 1 year who was admitted on 4/7/2022 with shortness of breath and was found to have radiographic evidence consistent with pneumonia, in the setting of severe COPD, noted to be c-diff positive during admission. Transitioned to oral antibiotics on 4/12, 4/14 worsening pulmonary status. 4/14 started on Levaquin PO.  Patient at baseline oxygen requirement with improved CRP.    Acute Hypoxic Respiratory Failure, improved  Community-acquired left mid and upper lobe pneumonia  Left cavitary lesion on chest CT  COPD Exacerbation  Elevated lactate, improved  Leukocytosis, stable  Admitted with left upper and mid lobe pneumonia with COPD exacerbation. Has had frequent outpatient antibiotics and steroid tapers. Was recommended to use oxygen 2.5 L at home, but only uses occasional. OP pulmonologist at [facility]. CT Chest 4/8 showed moderate upper lobe consolidation. CXR 4/10 showed continued pneumonia, and WBC, CRP consistently elevated. C-diff+ 4/12. WBC and CRP improved after treatment for c-diff initiated.   4/14 CXR showed no improvement. 4/15 Chest CT showing: new cavity formation involving the left upper lobe pneumonia with a 5 cm cavity now present within the densely consolidated lung. ID consulted, pulmonary re-consulted. WBC increased from 20-23.4 though this is likely concentration as patient is improving clinically with CRP down trended. Concern for abscess, cannot rule out malignancy vs other at this point. MRSA swab negative, cryto negative.   - Pulmonology consulted, appreciate recs, signed off 4/12->Reconsulted 4/15.     - Bronchoscopy would be ideal, but high risk for complication and poor lung reserve,  possibility of need vent is high  risk  - Consulted ID for new cavitation, appreciate recs   - IV Meropenem    - TB, Fungal, workup, broad differential   - Urine TB quant   - Airborne precautions    - Immediate family allowed to visit. Discussed with Nursing Supervisor, Dr. Travis, and daughter Leelee.  - Sputum culture needs to be collected   - Prednisone taper 30 mg, 20 mg, 10 mg, 5 mg for 3 days each  - Scheduled Duonebs Q4hrs with Mucomyst  - PRN Albuterol Nebs Q2Hrs  - Hold Trelegy Ellipta    - BMP, CBC, CRP daily  - Repeat imaging 4-6 weeks at discharge    Anti-Infective's  Ceftriaxone 1 G 4/7  Doxycyline IV 4/7-4/12->PO Doxycycline 4/13  Zosyn IV 4/7-4/12->Augmentin 4/12-4/13  Levaquin 750 mg PO 4/14, then discontinued  Zosyn 4/15, then discontinued  Meropenem 1 G 4/15->  PO Vanco C-diff 4/12->    CT Chest: 4/8/22  IMPRESSION:  1.  Moderate left upper lobe consolidation. This is consistent with pneumonia. Recommend a follow-up chest radiograph in 4-6 weeks to document resolution.  CT Chest: 4/16/22  1. There is new cavity formation involving the left upper lobe pneumonia with a 5 cm cavity now present within the densely consolidated lung.    Anxiety  Panic attack  Has had frequent episodes of anxiety overnight. Felt like she had a panic attack and claustrophobia overnight. Was given Ativan 0.5 mg x 1 and was able to sleep, found it helpful. She received Olanzapine x 1 during a delirium episode early in the admission which was also helpful. She would like to start something for anxiety while in the hospital. Do to prolonged QT with Vtach episode, limited in anti anxiety medication options.  Ativan p.o. previously given.  - Call daughter Leelee, she will attempt to calm her down  - Continue integrative therapies     Afib w/ RVR on Xarelto  Tachycardia, resolved  Prolonged QTC  Hx TIA, Factor V Leiden  Vtach  Metoprolol was initially on hold for hypotension, now resumed. Did have episode of Afib w RVR to 160's day  1 after Metoprolol was held for hypotension. No further Vtach at this point.   - Continuous telemetry  - Avoid QT prolonging medications  -Trend and replete electrolytes as needed, K goal >4.0, Mg goal >2.5  - Continue PTA Metoprolol  - Continue Xarelto as at home  - EKG to check QT PRN    C-diff Positive  Diarrhea, improved  Patient began having loose stools during admission. C-diff+ 4/11/22. Still weak from frequent stools.   - Vanco  mg QID x 10 days (4/12-)  - Continue probiotic BID    HFrEF   HTN  PTA took furosemide 80 mg total daily.  Received IV Lasix 20 mg 4/10.  Echo showed reduced EF from prior.  Hyperinflated lungs without radiographic evidence to suggest heart failure exacerbation on CXR 4/10.  Could consider stress test prior to discharge pending clinical improvement.  - Continue Lasix 20 mg daily to help with frequent urination (vs PTA 40 mg)  - Continue PTA Metoprolol to 75 mg XL daily   - Daily weights  - Strict I/O, pure wick if patient tolerates    Delirium, Resolved  Agitation  Gabapentin was decreased to 200 mg twice daily.  Vesicare and Myrbetriq were discontinued.  Patient's room was changed to be closer to nursing station.  No indication for one-to-one at this time.  Reevaluate daily.  - Delirium precautions  - Bundle cares  - Continue melatonin 5 mg at bedtime  - Open blinds, turn on lights during the day    Weight loss  Multifactorial in the setting of COPD, decreased oral intake.  Currently at weight from admission.  -RD consulted, appreciate recommendations and assistance     ANATOLIY on CKD, Improved  Admission Cr 2.44 BUN 36 with a variable baseline 1.5-2.0.    - Daily BMP  - Avoid nephrotoxins    Overactive bladder  Discontinued PTA Mirabegron 50 mg and Solifenacin 5 mg in the setting of acute delirium.  -Pure wick if patient tolerates    Fragile skin  -Wrap IV, avoid adhesives  -WOC  - Calmoseptine PRN    Goals of Care  DNR/DNI  Overall, poor long term prognosis. Has severe COPD with  frequent recurrent infections. Patient expressed not wanting to return to hospital. Daughter, Leelee, is helping with her healthcare management.   - Palliative care consulted, appreciate recs, signed off 4/14  - DNR/DNI    Chronic Conditions and Medications  Breast Cancer  DCIS, ER/TX positive, HER-2 negative, s/p left breast lumpectomy and left axillary sentinel lymph node biopsy.  - Cont. PTA Tamoxifen 20 daily  Osteoporosis  - Hold PTA VitD and Ca supplementation  Allergies  - Hold PTA Cetirizine and Flonase  GERD  - Cont. PTA 20 mg pepcid at bedtime  Normocytic normochromic anemia, stable  Admission Hgb of 10.9.  Hgbs 13.3 - 9.8 in the 2 months PTA. No ssx GI bleed.  -CBC as above  Chronic Pain secondary to T12 compression fracture  - Continue gabapentin 200 mg BID (dose decreased this admission)  - Continue Tramadol 50 mg Q12h as at home     Diet: Snacks/Supplements Adult: Ensure Enlive; With Meals  Regular Diet Adult    DVT Prophylaxis: Xarelto  Hayes Catheter: Not present  Fluids: PO  Central Lines: PRESENT  PICC Double Lumen 04/11/22 Right Brachial vein medial-Site Assessment: WDL  Cardiac Monitoring: None  Code Status: No CPR- Do NOT Intubate      Disposition Plan   Expected Discharge: 2-3 days   Anticipated discharge location: home with family;home with help/services       The patient's care was discussed with the Attending Physician, Dr. Janae Perez.    Discussed with Leelee on the phone.     Ileana Leon MD   [facility]  [facility]  _______________________________________________________    Interval History    Patient feels better today and her breathing, decreased diarrhea, feels better overall.  Less anxiety overnight.  She feels ready for physical therapy.  Looking forward to going home when able.  Patient reports no abdominal discomfort, no chest pain.    Physical Exam   Vital Signs: Temp: 98  F (36.7  C) Temp src: Oral BP: (!) 149/67 Pulse: 78   Resp: 18 SpO2:  92 % O2 Device: Nasal cannula Oxygen Delivery: 2 LPM  Weight: 110 lbs 12.8 oz  Physical Exam   Constitutional: Awake, alert, cooperative, oriented to person, place, and day. Appears anxious  Eyes: Lids and lashes normal, extra ocular muscles intact, sclera clear, conjunctiva normal.  ENT: Normocephalic, without obvious abnormality, atraumatic  Lungs:  LS CTA, diminished throughout, poor air exchange   Cardiovascular: Regular rate and rhythm, normal S1 and S2  Abdomen: Soft, non tender, no distention or guarding  Neurologic: Awake and alert. Cranial nerves II-XII are grossly intact.  Neuropsychiatric: Normal affect, mood, orientation, memory seems poor      Data   Recent Labs   Lab 04/17/22  0455 04/16/22  0636 04/15/22  2009 04/15/22  1028   WBC 23.4* 20.1*  --  25.8*   HGB 11.2* 9.8*  --  11.4*   MCV 87 86  --  85    307  --  331    141  --  141   POTASSIUM 4.4 4.2 4.6 3.4*   CHLORIDE 104 105  --  102   CO2 28 30  --  30   BUN 20 23  --  29*   CR 0.94 0.88  --  0.95   ANIONGAP 9 6  --  9   MESSI 8.2* 7.6*  --  8.3*   GLC 71 73  --  104     No results found for this or any previous visit (from the past 24 hour(s)).  Medications     - MEDICATION INSTRUCTIONS -         famotidine  20 mg Oral At Bedtime     fluticasone  1 spray Both Nostrils Daily     [Held by provider] fluticasone-vilanterol  1 puff Inhalation At Bedtime    And     [Held by provider] umeclidinium  1 puff Inhalation At Bedtime     furosemide  20 mg Oral Daily     gabapentin  200 mg Oral QAM     gabapentin  300 mg Oral At Bedtime     guaiFENesin  1,200 mg Oral BID     ipratropium - albuterol 0.5 mg/2.5 mg/3 mL  3 mL Nebulization 4x daily     lactobacillus rhamnosus (GG)  1 capsule Oral BID     melatonin  5 mg Oral At Bedtime     meropenem  1 g Intravenous Q12H     metoprolol succinate ER  75 mg Oral At Bedtime     multivitamin RENAL  1 tablet Oral Daily     predniSONE  20 mg Oral Daily    Followed by     [START ON 4/19/2022] predniSONE  10 mg  Oral Daily    Followed by     [START ON 4/22/2022] predniSONE  5 mg Oral Daily     rivaroxaban ANTICOAGULANT  15 mg Oral At Bedtime     simvastatin  40 mg Oral At Bedtime     sodium chloride  3 mL Nebulization 4x daily     tamoxifen  20 mg Oral QPM     traMADol  50 mg Oral BID     vancomycin  125 mg Oral 4x Daily

## 2022-04-17 NOTE — PLAN OF CARE
Note from [id number]. Pt rated headache pain 6-7/10 during shift thus far, tylenol effective. Skilled monitoring in all medical conditions. No new skin issues noted, offloads self. Dressing to buttocks is CDI. Full sensation per pt. Assist of 1 for transferring. Saline locked between abx. Voiding adequately. Education on medication administration and use of call-light to reduce risk for falls and injury. Alarms in place. Airborne precautions. No further issues noted. VSS, weaned to 2L O2, dyspnea noted with exertion. Will continue to monitor.    Taylor R Schoenecker, RN

## 2022-04-17 NOTE — PROGRESS NOTES
04/16/22 2035   Vital Signs   Resp 20   Pulse 77   Pulse Rate Source Monitor   Oxygen Therapy   SpO2 98 %   O2 Device Nasal cannula   Oxygen Delivery 3 LPM   Breath Sounds   Breath Sounds All Fields   All Lung Fields Breath Sounds crackles, fine;diminished   Nebulizer Assessment & Treatment   $RT Use ONLY Delivery Method Nebulizer - Additional   Nebulizer Device In line   Pretreatment Heart Rate (beats/min) 77   Pretreatment Resp Rate (breaths/min) 20   Pretreatment O2 sats - (TCU only) 92   Pretreat Breath Sounds - Bilat - All Lobes crackles;diminished   Breath Sounds Post-Respiratory Treatment   Posttreatment Heart Rate (beats/min) 81   Posttreatment Resp Rate (breaths/min) 19   Post treatment O2 Sats - (TCU only) 99   Posttreatment Assessment (SVN) breath sounds improved   Signs of Intolerance (SVN) none   Breath Sounds Posttreatment All Fields All Fields   Breath Sounds Posttreatment All Fields aeration increased   Positive Expiratory Pressure (PEP)   Device (PEP Therapy) Aerobika   Cycles (PEP Therapy) 3   OLE Oscillating Lung Expansion Therapy   OLE Application Mouthpiece   OLE Cycle Intensity Medium   CPEP Pressure (cmH20) 8   CPEP Duration (Min) 10   CHFO Duration (Min) 8   Patient Position semi-Scanlon's   Posttreatment Assessment (OLE Therapy) breath sounds improved   $CHFO Oscillating Lung Expansion Therapy Completed   IPV/Metaneb Therapy   Metaneb Mode CHFO;Aerosol Only   Number of Min/Metaneb 8   IPV/Metaneb Treatment Subsequent     Patient continues with scheduled duonebs/sodium chloride.  Metaneb tolerating well for short periods, productive cough.

## 2022-04-17 NOTE — PROGRESS NOTES
"INFECTIOUS DISEASE FOLLOW UP NOTE      ASSESSMENT:  Cavitary pneumonia. Rapid progression into cavitation in hospital while on Zosyn. Radiographic changes may not necessarily equate to antibiotic failure -- likely strep, anaerobes, sometimes gram negatives.   With elevated procalcitonin and significant leukocytosis and poor dentition this suggests polymicrobial bacterial infection  However what is concerning is progressive decline over many months and 40 pound weight loss which could signal either malignancy or chronic infection such as mycobacterial or fungal, with mediastinal lymphadenopathy visualized on CT chest.     PLAN:  On meropenem, expect IV course of either this or pip/tazo. 4-6 weeks with imaging follow up. Unless micro directs us otherwise.   Checking for TB, in isolation pending this. Immediate family can visit, masking.   Oral vancomycin for c diff.     Emir Travis MD  [facility]  [phone] [facility] paging    ______________________________________________________________________    SUBJECTIVE / INTERVAL HISTORY: coughs. Temps ok. Stools forming up. Brief visit due to her being on commode.     No known TB exposures. Previous travel to [place] years ago.     She asked that I call her daughter.     ROS: deconditioned. All other systems negative except as listed above.        OBJECTIVE:  BP (!) 149/67 (BP Location: Left arm)   Pulse 78   Temp 98  F (36.7  C) (Oral)   Resp 18   Ht 1.499 m (4' 11\")   Wt 50.3 kg (110 lb 12.8 oz)   SpO2 92%   BMI 22.38 kg/m         Vital Signs  Temp: 97.5  F (36.4  C)  Temp src: Oral  Resp: 18  Pulse: 75  Pulse Rate Source: Monitor  BP: 134/60  BP Location: Left arm    Temp (24hrs), Av.5  F (36.4  C), Min:97.2  F (36.2  C), Max:98.1  F (36.7  C)      GEN: No acute distress.  NC O2.   RESPIRATORY:  Normal breathing pattern. Decreased left side  CARDIOVASCULAR:  Regular rate and rhythm. Normal S1 and S2.   ABDOMEN:  " Soft, normal bowel sounds, non-tender, no masses.  EXTREMITIES: No edema.  SKIN/HAIR/NAILS:  No rashes. Ecchymosis.   IV: peripheral        Antibiotics:  Meropenem 4/15-  Vancomycin PO 4/12-    previous  Pip/tazo 4/8-12  Augmentin 4/12-14  Doxy 4/7-12  Levofloxacin 4/14    Pertinent labs:    Recent Labs   Lab 04/17/22  0455 04/16/22  0636 04/15/22  1028   WBC 23.4* 20.1* 25.8*   HGB 11.2* 9.8* 11.4*   HCT 34.4* 29.7* 34.0*    307 331        Recent Labs   Lab 04/17/22  0455 04/16/22  0636 04/15/22  1028    141 141   CO2 28 30 30   BUN 20 23 29*        Lab Results   Component Value Date    CRP 7.0 (H) 04/17/2022    CRP 8.7 (H) 04/16/2022    CRP 8.9 (H) 04/15/2022       Lab Results   Component Value Date    ALT 12 07/29/2021    AST 23 07/29/2021    ALKPHOS 51 01/12/2022         MICROBIOLOGY DATA:  c diff positive 4/11  Blood negative 4/7, 4/11 4/11 sputum mixedflora on gram stain -- culture not done due to squamous epi cells   4/16 sputum too many squamous cells  AFB pending  Crypto negative  aspergillus pending    RADIOLOGY:  NM Lung Scan Perfusion Particulate    Result Date: 4/7/2022  EXAM: NM LUNG SCAN PERFUSION PARTICULATE LOCATION: [facility] DATE/TIME: 4/7/2022 5:18 PM INDICATION: PE suspected, high prob COMPARISON: Chest x-ray 04/07/2022, V/Q scan 06/24/2020, CT chest exam 09/13/2019 TECHNIQUE: 7.9 mCi technetium-99m MAA, IV. Standard lung perfusion imaging. FINDINGS: Heterogeneous perfusion throughout the left lung with numerous small subsegmental perfusion defects similar to the previous VQ scan. Perfusion to the right lung is more homogeneous, with a few subtle areas of decreased activity also similar to the prior exam. Chest x-ray from today demonstrates emphysema with diffuse opacities in the left upper and mid left lung suggestive of pneumonia.     IMPRESSION: 1.  Chronic perfusion abnormalities throughout both lungs similar to the previous exam. No new perfusion  abnormalities. Recent chest x-ray is suggestive of left mid and upper lung pneumonia.    Echocardiogram Complete    Result Date: 2022  [id number]^TEREZA^GERALD^[place] [address]  Name: JUDY ZARCO MRN: [id number] : 1945 Study Date: 2022 02:58 PM Age: 77 yrs Gender: Female Patient Location: [facility] Reason For Study: Syncope Ordering Physician: GERALD STARKS Performed By: SUGEY  BSA: 1.4 m2 Height: 59 in Weight: 112 lb ______________________________________________________________________________ Procedure Complete Portable Echo Adult. ______________________________________________________________________________ Interpretation Summary  1. The left ventricle is normal in size. Left ventricular systolic performance is moderately reduced. The ejection fraction is estimated to be 35-40%. 2. There is moderate global reduction in left ventricular systolic performance. 3. There is abnormal septal motion possibly related to altered electrical activation due to bundle branch block. 4. There is mild-moderate, to perhaps moderate, tricuspid insufficiency. 5. Normal right ventricular size with mildly reduced right ventricular systolic performance. 6. There is mild right atrial enlargement. 7. Right ventricular systolic pressure relative to right atrial pressure is mildly increased. The pulmonary artery pressure is estimated to be 45-50 mmHg plus right atrial pressure (the IVC is mildly dilated).  When compared to the prior real-time echocardiogram dated 2021, there has been a mild further diminution in left ventricular systolic performance (it is this reader's impression that left ventricular systolic performance was mild-moderately reduced with an ejection fraction 40-45% on the prior examination). ______________________________________________________________________________ Left ventricle: The left ventricle is normal in size.  Left ventricular systolic performance is moderately reduced. The ejection fraction is estimated to be 35-40%. There is moderate global reduction in left ventricular systolic performance. There is abnormal septal motion possibly related to altered electrical activation due to bundle branch block. Left ventricular wall thickness is normal. 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  Assessment of left atrial pressure (LAP): The cumulative findings are indeterminate in evaluating left atrial pressure.  Right ventricle: Normal right ventricular size with mildly reduced right ventricular systolic performance.  Left atrium: There is borderline left atrial enlargement.  Right atrium: There is mild right atrial enlargement.  IVC: The IVC is mildly dilated.  Aortic valve: The aortic valve is comprised of three cusps.  Mitral valve: There is mild mitral annular calcification. There is trace mitral insufficiency.  Tricuspid valve: The tricuspid valve is grossly morphologically normal. There is mild-moderate, to perhaps moderate, tricuspid insufficiency.  Pulmonic valve: The pulmonic valve is grossly morphologically normal.  Thoracic aorta: The aortic root and proximal ascending aorta are of normal dimension.  Pericardium: There is no significant pericardial effusion. ______________________________________________________________________________ ______________________________________________________________________________ MMode/2D Measurements & Calculations RVDd: 3.5 cm IVSd: 0.98 cm LVIDd: 4.1 cm LVIDs: 3.3 cm LVPWd: 0.90 cm FS: 19.4 % LV mass(C)d: 122.9 grams LV mass(C)dI: 85.3 grams/m2 Ao root diam: 2.8 cm LA dimension: 3.5 cm asc Aorta Diam: 2.8 cm LA/Ao: 1.2 LVOT diam: 1.8 cm LVOT area: 2.6 cm2  LA Volume Indexed (AL/bp): 28.8 ml/m2 RWT: 0.44  Time Measurements MM HR: 98.0 BPM  Doppler Measurements & Calculations MV E max carolyn: 89.4 cm/sec MV A max carolyn: 135.9 cm/sec MV E/A: 0.66 MV dec time:  0.12 sec LV V1 max PG: 3.2 mmHg LV V1 max: 89.2 cm/sec LV V1 VTI: 18.3 cm SV(LVOT): 48.2 ml SI(LVOT): 33.4 ml/m2 TR max carolyn: 348.4 cm/sec TR max P.6 mmHg E/E' av.9 Lateral E/e': 11.0 Medial E/e': 16.8  ______________________________________________________________________________ Report approved by: Deepak Anderson 2022 04:24 PM       US Lower Extremity Venous Duplex Bilateral    Result Date: 2022  EXAM: US LOWER EXTREMITY VENOUS DUPLEX BILATERAL LOCATION: [facility] DATE/TIME: 2022 3:49 PM INDICATION: hypoxia, hypotension; leg swelling COMPARISON: None. TECHNIQUE: Venous Duplex ultrasound of bilateral lower extremities with and without compression, augmentation and duplex. Color flow and spectral Doppler with waveform analysis performed. FINDINGS: Exam includes the common femoral, femoral, popliteal veins as well as segmentally visualized deep calf veins and greater saphenous vein. RIGHT: No deep vein thrombosis. No superficial thrombophlebitis. No popliteal cyst. LEFT: No deep vein thrombosis. No superficial thrombophlebitis. No popliteal cyst.     IMPRESSION: 1.  No deep venous thrombosis in the bilateral lower extremities.    XR Chest Port 1 View    Result Date: 2022  EXAM: XR CHEST PORT 1 VIEW LOCATION: [facility] DATE/TIME: 2022 10:22 AM INDICATION: Worsening shortness of breath, coarse breath sounds. COMPARISON: 04/10/2022. Others.     IMPRESSION: Persistent dense left upper lobe consolidation. The surrounding opacities are stable to perhaps marginally improved, though the dense consolidative abnormality is without substantial change. Increased lucency superior to the consolidation could be from aerated lung or developing cavitation. Recommend continued follow-up. Small left pleural effusion. Right lung is grossly clear. Stable cardiomediastinal silhouette. Atherosclerosis. Right PICC tip near the distal SVC. NOTE:  ABNORMAL REPORT THE DICTATION ABOVE DESCRIBES AN ABNORMALITY FOR WHICH FOLLOW-UP IS NEEDED.     XR Chest Port 1 View    Result Date: 4/10/2022  EXAM: XR CHEST PORTABLE 1 VIEW LOCATION: [facility] DATE/TIME: 04/10/2022, 7:56 AM INDICATION: Shortness of breath. COMPARISON: CT of the chest, abdomen, and pelvis performed 04/08/2022. Chest radiograph performed 04/07/2022.     IMPRESSION: Ill-defined consolidation and infiltrate in the left upper lobe is again noted. Mild hyperinflation both lungs. The right lung is otherwise clear. Tortuous calcified thoracic aorta. Heart size has increased in prominence compared to 04/07/2022. Pulmonary vascularity is within normal limits where seen. Old right rib fractures. Right PICC line has been removed.     XR Chest Port 1 View    Result Date: 4/7/2022  EXAM: XR CHEST PORT 1 VIEW LOCATION: [facility] DATE/TIME: 4/7/2022 4:49 PM INDICATION: Cough. COMPARISON: 06/02/2021.     IMPRESSION: New moderate opacity in the left mid to upper lung suggestive of pneumonia given history of cough. However, this requires follow-up to complete resolution to exclude an underlying lesion (6-8 week follow-up radiographs). Right PICC tip near the cavoatrial junction. Normal heart size. Atherosclerosis. No significant pleural effusion.     CT Chest Abdomen Pelvis w/o Contrast    Result Date: 4/8/2022  EXAM: CT CHEST ABDOMEN PELVIS W/O CONTRAST LOCATION: [facility] DATE/TIME: 4/8/2022 2:17 PM INDICATION: History of breast cancer. Pneumonia. Weight loss. COMPARISON: CT chest 6/12/2020, CT chest/abdomen/pelvis 6/11/2019 TECHNIQUE: CT scan of the chest, abdomen, and pelvis was performed without IV contrast. Multiplanar reformats were obtained. Dose reduction techniques were used. CONTRAST: None. FINDINGS: LUNGS AND PLEURA: Moderate irregular consolidation in the posterior left upper lobe. Confluent emphysema. Mild bibasilar  atelectasis/scar. Trace pleural effusions. Stable mild diffuse bronchial wall thickening. A few stable benign pulmonary nodules. MEDIASTINUM/AXILLAE: Right PICC. Mediastinal lymphadenopathy with the largest largest being a right paratracheal node measuring 1.6 cm in short axis, image 48:3. Small pericardial effusion. The main pulmonary artery is enlarged at 3.0 cm which may be seen with pulmonary artery hypertension. CORONARY ARTERY CALCIFICATION: Moderate. HEPATOBILIARY: Normal. PANCREAS: Normal. SPLEEN: Normal. ADRENAL GLANDS: Normal. KIDNEYS/BLADDER: Low-lying right kidney. Subcentimeter renal hypodensities which are too small to characterize. No hydronephrosis. BOWEL: No obstruction or inflammatory change. Normal appendix. LYMPH NODES: Normal. VASCULATURE: Stable ectasia of the abdominal aorta measuring 2.9 x 2.3 cm, image 169:3. Moderate atherosclerosis. PELVIC ORGANS: Normal. MUSCULOSKELETAL: Left hip arthroplasty. Osseous demineralization. Degenerative changes of the spine. Remote left rib fractures. T12 compression fracture with 80% loss of height which is likely remote.     IMPRESSION: 1.  Moderate left upper lobe consolidation. This is consistent with pneumonia. Recommend a follow-up chest radiograph in 4-6 weeks to document resolution. 2.  Mediastinal lymphadenopathy. 3.  No acute abnormality in the abdomen or pelvis.    CT Chest w/o Contrast    Result Date: 4/15/2022  EXAM: CT CHEST W/O CONTRAST LOCATION: [facility] DATE/TIME: 4/15/2022 1:21 PM INDICATION: Pneumonia, effusion or abscess suspected, xray done COMPARISON: CT 4/8/2022 TECHNIQUE: CT chest without IV contrast. Multiplanar reformats were obtained. Dose reduction techniques were used. CONTRAST: None. FINDINGS: LUNGS AND PLEURA: There is now prominent cavity within the consolidated focus left upper lobe. Cavity measures approximately 5 cm with surrounding thick wall and lung consolidation surrounding fat. Mild increase  in overall size of left upper lobe involvement. Tiny left pleural effusion now present. Minimal linear density at lung bases. Slight mucus plugging seen posterior right lower lobe. Prominent diffuse changes of emphysema. MEDIASTINUM/AXILLAE: Small reactive lymph nodes are stable. Heavy atherosclerotic calcifications of aorta. CORONARY ARTERY CALCIFICATION: Moderate. UPPER ABDOMEN: Diffuse atherosclerotic calcifications. MUSCULOSKELETAL: Scoliosis and severe compression of T12 unchanged     IMPRESSION: 1.  There is new cavity formation involving the left upper lobe pneumonia with a 5 cm cavity now present within the densely consolidated lung. 2.  Diffuse emphysema unchanged.

## 2022-04-18 ENCOUNTER — APPOINTMENT (OUTPATIENT)
Dept: OCCUPATIONAL THERAPY | Facility: CLINIC | Age: 77
DRG: 177 | End: 2022-04-18
Payer: MEDICARE

## 2022-04-18 LAB
ANION GAP SERPL CALCULATED.3IONS-SCNC: 9 MMOL/L (ref 5–18)
ATRIAL RATE - MUSE: 64 BPM
BASOPHILS # BLD AUTO: 0.1 10E3/UL (ref 0–0.2)
BASOPHILS NFR BLD AUTO: 0 %
BUN SERPL-MCNC: 20 MG/DL (ref 8–28)
C REACTIVE PROTEIN LHE: 7 MG/DL (ref 0–0.8)
CALCIUM SERPL-MCNC: 8 MG/DL (ref 8.5–10.5)
CHLORIDE BLD-SCNC: 105 MMOL/L (ref 98–107)
CO2 SERPL-SCNC: 28 MMOL/L (ref 22–31)
CREAT SERPL-MCNC: 0.91 MG/DL (ref 0.6–1.1)
DIASTOLIC BLOOD PRESSURE - MUSE: NORMAL MMHG
EOSINOPHIL # BLD AUTO: 0 10E3/UL (ref 0–0.7)
EOSINOPHIL NFR BLD AUTO: 0 %
ERYTHROCYTE [DISTWIDTH] IN BLOOD BY AUTOMATED COUNT: 14.8 % (ref 10–15)
GFR SERPL CREATININE-BSD FRML MDRD: 65 ML/MIN/1.73M2
GLUCOSE BLD-MCNC: 83 MG/DL (ref 70–125)
HCT VFR BLD AUTO: 31.4 % (ref 35–47)
HGB BLD-MCNC: 10.2 G/DL (ref 11.7–15.7)
HOLD SPECIMEN: NORMAL
IMM GRANULOCYTES # BLD: 0.4 10E3/UL
IMM GRANULOCYTES NFR BLD: 1 %
INTERPRETATION ECG - MUSE: NORMAL
LACTATE SERPL-SCNC: 2.8 MMOL/L (ref 0.7–2)
LYMPHOCYTES # BLD AUTO: 0.9 10E3/UL (ref 0.8–5.3)
LYMPHOCYTES NFR BLD AUTO: 3 %
MAGNESIUM SERPL-MCNC: 1.8 MG/DL (ref 1.8–2.6)
MCH RBC QN AUTO: 27.9 PG (ref 26.5–33)
MCHC RBC AUTO-ENTMCNC: 32.5 G/DL (ref 31.5–36.5)
MCV RBC AUTO: 86 FL (ref 78–100)
MONOCYTES # BLD AUTO: 1.9 10E3/UL (ref 0–1.3)
MONOCYTES NFR BLD AUTO: 6 %
NEUTROPHILS # BLD AUTO: 28.2 10E3/UL (ref 1.6–8.3)
NEUTROPHILS NFR BLD AUTO: 90 %
NRBC # BLD AUTO: 0 10E3/UL
NRBC BLD AUTO-RTO: 0 /100
P AXIS - MUSE: 64 DEGREES
PHOSPHATE SERPL-MCNC: 2.6 MG/DL (ref 2.5–4.5)
PLATELET # BLD AUTO: 414 10E3/UL (ref 150–450)
POTASSIUM BLD-SCNC: 3.6 MMOL/L (ref 3.5–5)
PR INTERVAL - MUSE: 136 MS
QRS DURATION - MUSE: 128 MS
QT - MUSE: 482 MS
QTC - MUSE: 497 MS
R AXIS - MUSE: -57 DEGREES
RBC # BLD AUTO: 3.65 10E6/UL (ref 3.8–5.2)
SODIUM SERPL-SCNC: 142 MMOL/L (ref 136–145)
SYSTOLIC BLOOD PRESSURE - MUSE: NORMAL MMHG
T AXIS - MUSE: 86 DEGREES
VENTRICULAR RATE- MUSE: 64 BPM
WBC # BLD AUTO: 31.4 10E3/UL (ref 4–11)

## 2022-04-18 PROCEDURE — 250N000009 HC RX 250: Performed by: FAMILY MEDICINE

## 2022-04-18 PROCEDURE — 86635 COCCIDIOIDES ANTIBODY: CPT | Performed by: INTERNAL MEDICINE

## 2022-04-18 PROCEDURE — 250N000013 HC RX MED GY IP 250 OP 250 PS 637: Performed by: INTERNAL MEDICINE

## 2022-04-18 PROCEDURE — 99233 SBSQ HOSP IP/OBS HIGH 50: CPT | Performed by: INTERNAL MEDICINE

## 2022-04-18 PROCEDURE — 99232 SBSQ HOSP IP/OBS MODERATE 35: CPT | Mod: GC

## 2022-04-18 PROCEDURE — 250N000013 HC RX MED GY IP 250 OP 250 PS 637

## 2022-04-18 PROCEDURE — 86606 ASPERGILLUS ANTIBODY: CPT | Performed by: INTERNAL MEDICINE

## 2022-04-18 PROCEDURE — 84100 ASSAY OF PHOSPHORUS: CPT | Performed by: STUDENT IN AN ORGANIZED HEALTH CARE EDUCATION/TRAINING PROGRAM

## 2022-04-18 PROCEDURE — 86698 HISTOPLASMA ANTIBODY: CPT | Performed by: INTERNAL MEDICINE

## 2022-04-18 PROCEDURE — 250N000012 HC RX MED GY IP 250 OP 636 PS 637

## 2022-04-18 PROCEDURE — 80048 BASIC METABOLIC PNL TOTAL CA: CPT | Performed by: STUDENT IN AN ORGANIZED HEALTH CARE EDUCATION/TRAINING PROGRAM

## 2022-04-18 PROCEDURE — 86140 C-REACTIVE PROTEIN: CPT | Performed by: STUDENT IN AN ORGANIZED HEALTH CARE EDUCATION/TRAINING PROGRAM

## 2022-04-18 PROCEDURE — 94640 AIRWAY INHALATION TREATMENT: CPT | Mod: 76

## 2022-04-18 PROCEDURE — 87206 SMEAR FLUORESCENT/ACID STAI: CPT | Performed by: INTERNAL MEDICINE

## 2022-04-18 PROCEDURE — 250N000013 HC RX MED GY IP 250 OP 250 PS 637: Performed by: FAMILY MEDICINE

## 2022-04-18 PROCEDURE — 86612 BLASTOMYCES ANTIBODY: CPT | Performed by: INTERNAL MEDICINE

## 2022-04-18 PROCEDURE — 250N000013 HC RX MED GY IP 250 OP 250 PS 637: Performed by: STUDENT IN AN ORGANIZED HEALTH CARE EDUCATION/TRAINING PROGRAM

## 2022-04-18 PROCEDURE — 250N000011 HC RX IP 250 OP 636: Performed by: STUDENT IN AN ORGANIZED HEALTH CARE EDUCATION/TRAINING PROGRAM

## 2022-04-18 PROCEDURE — 83735 ASSAY OF MAGNESIUM: CPT | Performed by: STUDENT IN AN ORGANIZED HEALTH CARE EDUCATION/TRAINING PROGRAM

## 2022-04-18 PROCEDURE — 87305 ASPERGILLUS AG IA: CPT | Performed by: INTERNAL MEDICINE

## 2022-04-18 PROCEDURE — 97110 THERAPEUTIC EXERCISES: CPT | Mod: GO

## 2022-04-18 PROCEDURE — 250N000009 HC RX 250: Performed by: STUDENT IN AN ORGANIZED HEALTH CARE EDUCATION/TRAINING PROGRAM

## 2022-04-18 PROCEDURE — 87449 NOS EACH ORGANISM AG IA: CPT | Performed by: INTERNAL MEDICINE

## 2022-04-18 PROCEDURE — 82785 ASSAY OF IGE: CPT | Performed by: INTERNAL MEDICINE

## 2022-04-18 PROCEDURE — 85025 COMPLETE CBC W/AUTO DIFF WBC: CPT | Performed by: STUDENT IN AN ORGANIZED HEALTH CARE EDUCATION/TRAINING PROGRAM

## 2022-04-18 PROCEDURE — 36415 COLL VENOUS BLD VENIPUNCTURE: CPT

## 2022-04-18 PROCEDURE — 120N000001 HC R&B MED SURG/OB

## 2022-04-18 PROCEDURE — 83605 ASSAY OF LACTIC ACID: CPT | Performed by: STUDENT IN AN ORGANIZED HEALTH CARE EDUCATION/TRAINING PROGRAM

## 2022-04-18 PROCEDURE — 87385 HISTOPLASMA CAPSUL AG IA: CPT | Performed by: INTERNAL MEDICINE

## 2022-04-18 PROCEDURE — 87040 BLOOD CULTURE FOR BACTERIA: CPT

## 2022-04-18 PROCEDURE — 999N000157 HC STATISTIC RCP TIME EA 10 MIN

## 2022-04-18 RX ORDER — HYDRALAZINE HYDROCHLORIDE 20 MG/ML
5 INJECTION INTRAMUSCULAR; INTRAVENOUS EVERY 6 HOURS PRN
Status: DISCONTINUED | OUTPATIENT
Start: 2022-04-18 | End: 2022-04-22 | Stop reason: HOSPADM

## 2022-04-18 RX ORDER — LORAZEPAM 0.5 MG/1
0.5 TABLET ORAL ONCE
Status: COMPLETED | OUTPATIENT
Start: 2022-04-18 | End: 2022-04-18

## 2022-04-18 RX ADMIN — IPRATROPIUM BROMIDE AND ALBUTEROL SULFATE 3 ML: 2.5; .5 SOLUTION RESPIRATORY (INHALATION) at 20:35

## 2022-04-18 RX ADMIN — VANCOMYCIN HYDROCHLORIDE 125 MG: 125 CAPSULE ORAL at 13:40

## 2022-04-18 RX ADMIN — GUAIFENESIN 1200 MG: 600 TABLET ORAL at 09:28

## 2022-04-18 RX ADMIN — FIDAXOMICIN 200 MG: 200 TABLET, FILM COATED ORAL at 10:55

## 2022-04-18 RX ADMIN — TAMOXIFEN CITRATE 20 MG: 10 TABLET ORAL at 20:47

## 2022-04-18 RX ADMIN — SIMVASTATIN 40 MG: 40 TABLET, FILM COATED ORAL at 20:46

## 2022-04-18 RX ADMIN — LORAZEPAM 0.5 MG: 0.5 TABLET ORAL at 06:34

## 2022-04-18 RX ADMIN — VANCOMYCIN HYDROCHLORIDE 125 MG: 125 CAPSULE ORAL at 20:45

## 2022-04-18 RX ADMIN — IPRATROPIUM BROMIDE AND ALBUTEROL SULFATE 3 ML: 2.5; .5 SOLUTION RESPIRATORY (INHALATION) at 16:49

## 2022-04-18 RX ADMIN — SODIUM CHLORIDE SOLN NEBU 3% 3 ML: 3 NEBU SOLN at 11:59

## 2022-04-18 RX ADMIN — GUAIFENESIN 1200 MG: 600 TABLET ORAL at 20:45

## 2022-04-18 RX ADMIN — GABAPENTIN 300 MG: 300 CAPSULE ORAL at 20:46

## 2022-04-18 RX ADMIN — IPRATROPIUM BROMIDE AND ALBUTEROL SULFATE 3 ML: 2.5; .5 SOLUTION RESPIRATORY (INHALATION) at 11:59

## 2022-04-18 RX ADMIN — MEROPENEM 1 G: 1 INJECTION, POWDER, FOR SOLUTION INTRAVENOUS at 04:40

## 2022-04-18 RX ADMIN — Medication 1 CAPSULE: at 20:46

## 2022-04-18 RX ADMIN — B-COMPLEX W/ C & FOLIC ACID TAB 1 MG 1 TABLET: 1 TAB at 09:28

## 2022-04-18 RX ADMIN — ALBUTEROL SULFATE 2.5 MG: 2.5 SOLUTION RESPIRATORY (INHALATION) at 17:59

## 2022-04-18 RX ADMIN — TRAMADOL HYDROCHLORIDE 50 MG: 50 TABLET, COATED ORAL at 20:45

## 2022-04-18 RX ADMIN — ALBUTEROL SULFATE 2.5 MG: 2.5 SOLUTION RESPIRATORY (INHALATION) at 05:36

## 2022-04-18 RX ADMIN — RIVAROXABAN 15 MG: 15 TABLET, FILM COATED ORAL at 20:46

## 2022-04-18 RX ADMIN — FIDAXOMICIN 200 MG: 200 TABLET, FILM COATED ORAL at 20:47

## 2022-04-18 RX ADMIN — HYDRALAZINE HYDROCHLORIDE 5 MG: 20 INJECTION INTRAMUSCULAR; INTRAVENOUS at 04:27

## 2022-04-18 RX ADMIN — IPRATROPIUM BROMIDE AND ALBUTEROL SULFATE 3 ML: 2.5; .5 SOLUTION RESPIRATORY (INHALATION) at 05:20

## 2022-04-18 RX ADMIN — FUROSEMIDE 20 MG: 20 TABLET ORAL at 09:28

## 2022-04-18 RX ADMIN — GABAPENTIN 200 MG: 100 CAPSULE ORAL at 06:23

## 2022-04-18 RX ADMIN — SODIUM CHLORIDE SOLN NEBU 3% 3 ML: 3 NEBU SOLN at 20:34

## 2022-04-18 RX ADMIN — METOPROLOL SUCCINATE 75 MG: 50 TABLET, EXTENDED RELEASE ORAL at 20:45

## 2022-04-18 RX ADMIN — FAMOTIDINE 20 MG: 20 TABLET ORAL at 20:46

## 2022-04-18 RX ADMIN — ACETAMINOPHEN 1000 MG: 500 TABLET, FILM COATED ORAL at 06:42

## 2022-04-18 RX ADMIN — IPRATROPIUM BROMIDE AND ALBUTEROL SULFATE 3 ML: 2.5; .5 SOLUTION RESPIRATORY (INHALATION) at 08:34

## 2022-04-18 RX ADMIN — SODIUM CHLORIDE SOLN NEBU 3% 3 ML: 3 NEBU SOLN at 08:34

## 2022-04-18 RX ADMIN — MEROPENEM 1 G: 1 INJECTION, POWDER, FOR SOLUTION INTRAVENOUS at 15:56

## 2022-04-18 RX ADMIN — SODIUM CHLORIDE SOLN NEBU 3% 3 ML: 3 NEBU SOLN at 16:49

## 2022-04-18 RX ADMIN — PREDNISONE 20 MG: 20 TABLET ORAL at 06:22

## 2022-04-18 RX ADMIN — VANCOMYCIN HYDROCHLORIDE 125 MG: 125 CAPSULE ORAL at 09:28

## 2022-04-18 RX ADMIN — Medication 1 CAPSULE: at 09:28

## 2022-04-18 RX ADMIN — VANCOMYCIN HYDROCHLORIDE 125 MG: 125 CAPSULE ORAL at 16:45

## 2022-04-18 ASSESSMENT — ACTIVITIES OF DAILY LIVING (ADL)
ADLS_ACUITY_SCORE: 11
ADLS_ACUITY_SCORE: 9
ADLS_ACUITY_SCORE: 9
ADLS_ACUITY_SCORE: 11
ADLS_ACUITY_SCORE: 9
ADLS_ACUITY_SCORE: 11
ADLS_ACUITY_SCORE: 9
ADLS_ACUITY_SCORE: 11
ADLS_ACUITY_SCORE: 11
ADLS_ACUITY_SCORE: 9
ADLS_ACUITY_SCORE: 11
ADLS_ACUITY_SCORE: 11

## 2022-04-18 NOTE — PROGRESS NOTES
Pt given Duoneb and Nacl neb as ordered.  Pt declined METANEB/AEROBIKA 0800 and 1600, as pt SOB.  Pt did Metaneb with noon tx for approx 10 minutes.  Pt is on 2 LPM NC, O2 sats mid 90's, BS diminished, cs at times.  Will continue to monitor pt.

## 2022-04-18 NOTE — PROGRESS NOTES
04/18/22 0530   Tech Time   $Tech Time (10 minute increments) 3   Vital Signs   Resp 24   Pulse 92   Pulse Rate Source Monitor   Patient Position Fowlers   Oxygen Therapy   SpO2 96 %   O2 Device Nasal cannula   Oxygen Delivery 3 LPM   Assessment   Rhythm/Pattern, Respiratory dyspnea on exertion;shallow;shortness of breath;pursed lip breathing   Breath Sounds   All Lung Fields Breath Sounds crackles, coarse;diminished   Nebulizer Assessment & Treatment   $RT Use ONLY Delivery Method Nebulizer - Additional   Nebulizer Device Mouthpiece   Pretreatment Heart Rate (beats/min) 91   Pretreatment Resp Rate (breaths/min) 24   Pretreatment O2 sats - (TCU only) 97   Pretreat Breath Sounds - Bilat - All Lobes crackles, coarse;diminished   Breath Sounds Post-Respiratory Treatment   Posttreatment Heart Rate (beats/min) 94   Posttreatment Resp Rate (breaths/min) 22   Post treatment O2 Sats - (TCU only) 97   Posttreatment Assessment (SVN) patient reports breathing improved   Breath Sounds Posttreatment All Fields All Fields   Breath Sounds Posttreatment All Fields crackles, coarse;diminished     Duoneb and PRN albuterol given, due to work of breathing.  Patient reported slight improvement after second neb was given.  Patient currently has duo neb 4X daily with hypertonic BID, metaneb being used for airway clearance.

## 2022-04-18 NOTE — PROGRESS NOTES
PULMONARY MEDICINE PROGRESS NOTE  04/18/2022    Admit Date: 4/7/2022  CODE: No CPR- Do NOT Intubate    Reason for Consult: acute exacerbation of COPD, lung abscess     Assessment/Plan:     77 year old female with a history of severe asthma-COPD overlap syndrome with hyper-IgE followed at [facility], now in with severe left-sided CAP and AECOPD.  Patient had been started on levofloxacin for her presumed pneumonia.  However, a follow-up CT scan of the chest, her lung consolidation is progressed to a abscess, although malignancy remains on the differential given her significant weight loss prior to admission.  She is also on p.o. vancomycin for C. difficile     Clinical course more consistent with necrotizing pneumonia rather than malignancy.    I agree with infectious disease regarding prolonged antibiotics.  Augmentin is a reasonable option as an outpatient.  Continue supplemental oxygen-continuous pulse oximetry stopped.  Repeat CT in 6 weeks  Complete steroid taper    Can restart home regimen on discharge.    Her follow-up will be with [facility].  We need to make sure the discharge summary gets sent to their clinic.    Pulmonary will continue to follow.    Greater than 35 minutes spent in this visit.  Greater than 50% counseling and coordination of care.                                                                                                                                                        SUBJECTIVE/INTERVAL HISTORY     Stephanie is in good spirits today.  Her energy is still low but her breathing is doing okay.  She had an issue overnight with increased shortness of breath or anxiety and this improved.  She really appreciates the efforts of the staff.                                                                                                                                                    Exam/Data:     Vitals  /56 (BP Location: Left arm)   Pulse 80   Temp 97.6  F (36.4  C) (Oral)   " Resp 20   Ht 1.499 m (4' 11\")   Wt 50.3 kg (110 lb 12.8 oz)   SpO2 92%   BMI 22.38 kg/m       I/O last 3 completed shifts:  In: 1020 [P.O.:1020]  Out: 1850 [Urine:1850]  Weight change:     EXAM:  /56 (BP Location: Left arm)   Pulse 80   Temp 97.6  F (36.4  C) (Oral)   Resp 20   Ht 1.499 m (4' 11\")   Wt 50.3 kg (110 lb 12.8 oz)   SpO2 92%   BMI 22.38 kg/m      Intake/Output last 3 shifts:  I/O last 3 completed shifts:  In: 1020 [P.O.:1020]  Out: 1850 [Urine:1850]  Intake/Output this shift:  No intake/output data recorded.      Gen: alert, oriented  Neck supple, no palpable thyroid  CV: RRR, no m/g/r  Resp: faint crackles right > left, diminished  Abd: soft, nontender, BS+  Skin: no rashes or lesions  Ext: no edema   Neuro: PERRL, nonfocal exam    ROS: A complete 10-system review of systems was obtained and is negative with the exception of what is noted in the history of present illness.    Medications:       - MEDICATION INSTRUCTIONS -         famotidine  20 mg Oral At Bedtime     fidaxomicin  200 mg Oral BID     fluticasone  1 spray Both Nostrils Daily     [Held by provider] fluticasone-vilanterol  1 puff Inhalation At Bedtime    And     [Held by provider] umeclidinium  1 puff Inhalation At Bedtime     furosemide  20 mg Oral Daily     gabapentin  200 mg Oral QAM     gabapentin  300 mg Oral At Bedtime     guaiFENesin  1,200 mg Oral BID     ipratropium - albuterol 0.5 mg/2.5 mg/3 mL  3 mL Nebulization 4x daily     lactobacillus rhamnosus (GG)  1 capsule Oral BID     melatonin  5 mg Oral At Bedtime     meropenem  1 g Intravenous Q12H     metoprolol succinate ER  75 mg Oral At Bedtime     multivitamin RENAL  1 tablet Oral Daily     [START ON 4/19/2022] predniSONE  10 mg Oral Daily    Followed by     [START ON 4/22/2022] predniSONE  5 mg Oral Daily     rivaroxaban ANTICOAGULANT  15 mg Oral At Bedtime     simvastatin  40 mg Oral At Bedtime     sodium chloride  3 mL Nebulization 4x daily     tamoxifen  " 20 mg Oral QPM     traMADol  50 mg Oral BID     vancomycin  125 mg Oral 4x Daily         DATA  All laboratory and radiology has been personally reviewed by myself today.  Recent Labs   Lab 04/18/22  0741   WBC 31.4*   HGB 10.2*   HCT 31.4*        Recent Labs   Lab 04/18/22  0741 04/17/22  0455 04/16/22  0636    141 141   CO2 28 28 30   BUN 20 20 23     CT chest reviewed interpreted by me: Consolidation changing to cavitary lesion.

## 2022-04-18 NOTE — PROGRESS NOTES
Care Management Follow Up    Length of Stay (days): 11    Expected Discharge Date: 04/18/2022     Concerns to be Addressed: Medical Progression and Discharge Planning  Patient plan of care discussed at interdisciplinary rounds: Yes    Anticipated Discharge Disposition: TCU     Anticipated Discharge Services: TCU  Anticipated Discharge DME: None    Patient/family educated on Medicare website which has current facility and service quality ratings: yes  Education Provided on the Discharge Plan:  TBD  Patient/Family in Agreement with the Plan: TBD    Referrals Placed by CM/SW: TCU  Private pay costs discussed: Not at this time    Additional Information:  SW intern left VM with [facility] following up on referral.     RODOLFO Barclay Intern

## 2022-04-18 NOTE — PLAN OF CARE
Note from [id number]. Pt denied pain during shift thus far. Skilled monitoring in all medical conditions. No new skin issues noted, offloads self. Dressing to buttocks is CDI. Full sensation per pt. Assist of 1 for transferring. Saline locked between abx. Pt stating she needs to use the purewick and is now refusing use of the commode, voiding adequately. Education on medication administration and use of call-light to reduce risk for falls and injury. Alarms in place. Airborne precautions. Sputum sent. No further issues noted. VSS, weaned to 2L O2, dyspnea noted with exertion. Discontinued pulse ox per pulmonology. Will continue to monitor.    Taylor R Schoenecker, RN

## 2022-04-18 NOTE — PROVIDER NOTIFICATION
Residents text paged at 1103 regarding the BPA that fired regarding possible sepsis (WBC 31.4 and lactic 2.8).     Per BFM, no new orders at this time.    Taylor R Schoenecker, RN       Detail Level: Zone Detail Level: Simple

## 2022-04-18 NOTE — PROGRESS NOTES
Sepsis Evaluation Progress Note    I was called to see Irene León due to abnormal vital signs triggering the Sepsis SIRS screening alert. She is known to have an infection.     Physical Exam   Vital Signs:  Temp: 97.6  F (36.4  C) Temp src: Oral BP: 113/56 Pulse: 80   Resp: 20 SpO2: 92 % O2 Device: Nasal cannula Oxygen Delivery: 2 LPM     General: chronically ill appearing  Mental Status: AAOx4.     Remainder of physical exam is significant for breath sounds coarse on left mid/base posterior.     Data   Lactic Acid   Date Value Ref Range Status   04/18/2022 2.8 (H) 0.7 - 2.0 mmol/L Final   04/17/2022 1.1 0.7 - 2.0 mmol/L Final       Assessment & Plan   NO EVIDENCE OF SEPSIS at this time.  Vital sign, physical exam, and lab findings are due to known necrotizing pneumonia likely cause of elevation in lactic acid. On steroids, so wbc unreliable. Cllinically improving. .    Disposition: The patient will remain on the current unit. We will continue to monitor this patient closely. and Attending physician, Dr. Lennox Quinonez was notified.      - Blood cultures x 2 sent  - Continue IV Meropenem    Irene Lombardo MD    Sepsis Criteria   Sepsis: 2+ SIRS criteria due to infection  Severe Sepsis: Sepsis AND 1+ new sign of acute organ dysfunction (Note: lactate >2 or acute encephalopathy each qualify as organ dysfunction)  Septic Shock: Sepsis AND hypotension despite volume resuscitation with 30 ml/kg crystalloid or lactate >=4  Note: HYPOTENSION is defined as 2 BP readings measured 3 hrs apart that have a SBP <90, MAP <65, or decrease >40 mmHg, occurring 6 hrs before or after t-zero

## 2022-04-18 NOTE — PROGRESS NOTES
[facility]    Progress Note - Hospitalist Service       Date of Admission:  4/7/2022    Assessment & Plan    Irene León is a 77 year old female admitted on 4/7/2022 for community acquired pneumonia in the setting of severe COPD on baseline 2L O2 by NC. PMHx also includes HFrEF, LBBB, HTN, factor V Leiden and atrial fibrillation on Xarelto, CKD stage 3, and 35+ lb unintentional weight loss in the past one year. Hospitalization was initially complicated by resolved delirium, followed by frequent stooling and diagnosis of C difficile diarrhea. After completing a course of antibiotics for pneumonia, she experienced acute worsening of respiratory status and repeat CT chest revealed interim development of a 5cm left upper lobe cavitation. Infectious disease and pulmonology were consulted.  Antibiotics were broadened to zosyn then transitioned to meropenem. Discharge plan pending continued improvement and final ID/Pulm recommendations.    Acute Hypoxic Respiratory Failure, improved  Community-acquired left mid and upper lobe pneumonia  Left cavitary lesion on chest CT  COPD Exacerbation  Elevated lactate, improved  Leukocytosis, worsening  Admitted with left upper and mid lobe pneumonia with COPD exacerbation. Has had frequent outpatient antibiotics and steroid tapers. Was recommended to use oxygen 2.5 L at home, but only uses occasional. OP pulmonologist at [facility]. CT Chest 4/8 showed moderate upper lobe consolidation. CXR 4/10 showed continued pneumonia, and WBC, CRP consistently elevated. C-diff+ 4/12. 4/14 CXR showed no improvement. 4/15 Chest CT showing: new cavity formation involving the left upper lobe pneumonia with a 5 cm cavity now present within the densely consolidated lung. ID consulted, pulmonary re-consulted. Concern for abscess, cannot rule out malignancy vs other at this point.  - Pulmonology consulted, appreciate recs, signed off 4/12->Reconsulted 4/15.     -  Bronchoscopy would be ideal, but high risk for complication and poor lung reserve, possibility of need vent is high  risk  - Consulted ID for new cavitation, appreciate recs   - IV Meropenem    - TB, Fungal, workup, broad differential   - Sputum culture pending   - Airborne precautions   - Immediate family allowed to visit. Discussed with Nursing Supervisor, Dr. Travis.  - Prednisone taper 30 mg, 20 mg, 10 mg, 5 mg for 3 days each  - Scheduled Duonebs Q4hrs with Mucomyst  - PRN Albuterol Nebs Q2Hrs  - Hold Trelegy Ellipta    - BMP, CBC, CRP daily  - Repeat imaging 4-6 weeks at discharge    Hospitalization Anti-Infective's  Ceftriaxone 1 G 4/7  Doxycyline IV 4/7-4/12->PO Doxycycline 4/13  Zosyn IV 4/7-4/12->Augmentin 4/12-4/13  Levaquin 750 mg PO 4/14, then discontinued  Zosyn 4/15, then discontinued  Meropenem 1 G 4/15->  PO Vanco C-diff 4/12->    Anxiety  Panic attack  Has had frequent episodes of anxiety overnight. Felt like she had a panic attack and claustrophobia overnight. Was given Ativan 0.5 mg x 1 and was able to sleep, found it helpful. She received Olanzapine x 1 during a delirium episode early in the admission which was also helpful. She would like to start something for anxiety while in the hospital. Do to prolonged QT with Vtach episode, limited in anti anxiety medication options.  Ativan p.o. previously given.  - Call daughter Leelee, she will attempt to calm her down  - Continue integrative therapies     Afib w/ RVR on Xarelto  Prolonged QTC  Hx TIA, Factor V Leiden  Metoprolol was initially on hold for hypotension, now resumed. Did have episode of Afib w RVR to 160's day 1 after Metoprolol was held for hypotension.  - Continuous telemetry  - Avoid QT prolonging medications  -Trend and replete electrolytes as needed, K goal >4.0, Mg goal >2.5  - Continue PTA Metoprolol  - Continue Xarelto as at home  - EKG to check QT PRN    C-diff Positive  Diarrhea, improved  Patient began having loose stools during  admission. C-diff+ 4/11/22. Still weak from frequent stools.   - Vanco  mg QID x 10 days (4/12-4/21)  - Continue probiotic BID    HFrEF   HTN  PTA took furosemide 80 mg total daily.  Received IV Lasix 20 mg 4/10.  Echo showed reduced EF from prior.  Hyperinflated lungs without radiographic evidence to suggest heart failure exacerbation on CXR 4/10.  Could consider stress test prior to discharge pending clinical improvement.  - Continue Lasix 20 mg daily to help with frequent urination (vs PTA 40 mg)  - Continue PTA Metoprolol to 75 mg XL daily   - Daily weights  - Strict I/O, pure wick if patient tolerates    Weight loss  Multifactorial in the setting of COPD, decreased oral intake.  Currently at weight from admission.  -RD consulted, appreciate recommendations and assistance     ANATOLIY on CKD, Improved  Admission Cr 2.44 BUN 36 with a variable baseline 1.5-2.0.    - Daily BMP  - Avoid nephrotoxins    Delirium, Resolved  Agitation  Gabapentin was decreased to 200 mg twice daily.  Vesicare and Myrbetriq were discontinued.      Overactive bladder  Discontinued PTA Mirabegron 50 mg and Solifenacin 5 mg in the setting of acute delirium.    Fragile skin  -Wrap IV, avoid adhesives  -WOC  - Calmoseptine PRN    Goals of Care  DNR/DNI  Overall, poor long term prognosis. Has severe COPD with frequent recurrent infections. Patient expressed not wanting to return to hospital. Daughter, Leelee, is helping with her healthcare management.   - Palliative care consulted, appreciate recs, signed off 4/14  - DNR/DNI    Chronic Conditions and Medications  Breast Cancer  DCIS, ER/NJ positive, HER-2 negative, s/p left breast lumpectomy and left axillary sentinel lymph node biopsy.  - Cont. PTA Tamoxifen 20 daily  Osteoporosis  - Hold PTA VitD and Ca supplementation  Allergies  - Hold PTA Cetirizine and Flonase  GERD  - Cont. PTA 20 mg pepcid at bedtime  Normocytic normochromic anemia, stable  Admission Hgb of 10.9.  Hgbs 13.3 - 9.8 in  the 2 months PTA. No ssx GI bleed.  -CBC as above  Chronic Pain secondary to T12 compression fracture  - Continue gabapentin 200 mg BID (dose decreased this admission)  - Continue Tramadol 50 mg Q12h as at home     Diet: Snacks/Supplements Adult: Ensure Enlive; With Meals  Regular Diet Adult    DVT Prophylaxis: Xarelto  Hayes Catheter: Not present  Fluids: PO  Central Lines: PRESENT  PICC Double Lumen 04/11/22 Right Brachial vein medial-Site Assessment: WDL  Cardiac Monitoring: None  Code Status: No CPR- Do NOT Intubate      Disposition Plan   Expected Discharge: 2-3 days   Anticipated discharge location: home with family;home with help/services       The patient's care was discussed with the Attending Physician, Dr. Lennox Quinonez.     Discussed with Leelee on the phone.     Irene Lombardo MD   [facility]  [facility]  _______________________________________________________    Interval History    Patient feeling better when I saw her. Reports breathing slowly improved. Was very grateful for her overnight nurse's care during night anxiety/sob attack. Diarrhea improving, had one loose stool overnight.     Physical Exam   Vital Signs: Temp: 97.6  F (36.4  C) Temp src: Oral BP: 113/56 Pulse: 80   Resp: 20 SpO2: 95 % O2 Device: Nasal cannula Oxygen Delivery: 3 LPM  Weight: 110 lbs 12.8 oz  Physical Exam   Constitutional: Awake, alert, cooperative, oriented to person, place, and day. Appears anxious  Eyes: Lids and lashes normal, extra ocular muscles intact, sclera clear, conjunctiva normal.  ENT: Normocephalic, without obvious abnormality, atraumatic  Lungs:  LS left mid/base posterior with coarse breath sounds. Right clear, but poor air exchange  Cardiovascular: Regular rate and rhythm, normal S1 and S2  Abdomen: Soft, non tender, no distention or guarding  Neurologic: Awake and alert. Cranial nerves II-XII are grossly intact.  Neuropsychiatric: Normal affect, mood, orientation, memory  seems poor      Data   Recent Labs   Lab 04/18/22  0741 04/17/22  0455 04/16/22  0636   WBC 31.4* 23.4* 20.1*   HGB 10.2* 11.2* 9.8*   MCV 86 87 86    418 307    141 141   POTASSIUM 3.6 4.4 4.2   CHLORIDE 105 104 105   CO2 28 28 30   BUN 20 20 23   CR 0.91 0.94 0.88   ANIONGAP 9 9 6   MESSI 8.0* 8.2* 7.6*   GLC 83 71 73     No results found for this or any previous visit (from the past 24 hour(s)).  Medications     - MEDICATION INSTRUCTIONS -         famotidine  20 mg Oral At Bedtime     fidaxomicin  200 mg Oral BID     fluticasone  1 spray Both Nostrils Daily     [Held by provider] fluticasone-vilanterol  1 puff Inhalation At Bedtime    And     [Held by provider] umeclidinium  1 puff Inhalation At Bedtime     furosemide  20 mg Oral Daily     gabapentin  200 mg Oral QAM     gabapentin  300 mg Oral At Bedtime     guaiFENesin  1,200 mg Oral BID     ipratropium - albuterol 0.5 mg/2.5 mg/3 mL  3 mL Nebulization 4x daily     lactobacillus rhamnosus (GG)  1 capsule Oral BID     melatonin  5 mg Oral At Bedtime     meropenem  1 g Intravenous Q12H     metoprolol succinate ER  75 mg Oral At Bedtime     multivitamin RENAL  1 tablet Oral Daily     [START ON 4/19/2022] predniSONE  10 mg Oral Daily    Followed by     [START ON 4/22/2022] predniSONE  5 mg Oral Daily     rivaroxaban ANTICOAGULANT  15 mg Oral At Bedtime     simvastatin  40 mg Oral At Bedtime     sodium chloride  3 mL Nebulization 4x daily     tamoxifen  20 mg Oral QPM     traMADol  50 mg Oral BID     vancomycin  125 mg Oral 4x Daily     CT Chest: 4/8/22  IMPRESSION:  1.  Moderate left upper lobe consolidation. This is consistent with pneumonia. Recommend a follow-up chest radiograph in 4-6 weeks to document resolution.  CT Chest: 4/16/22  1. There is new cavity formation involving the left upper lobe pneumonia with a 5 cm cavity now present within the densely consolidated lung.

## 2022-04-18 NOTE — PROGRESS NOTES
CLINICAL NUTRITION SERVICES - REASSESSMENT NOTE     Nutrition Prescription    RECOMMENDATIONS FOR MDs/PROVIDERS TO ORDER:  None at this time     Malnutrition Status:    Pt does not meet 2 criteria     Recommendations already ordered by Registered Dietitian (RD):  None at this time    Future/Additional Recommendations:  Ok or RN to discontinue Ensure if pt doesn't want or taking in adequate po      EVALUATION OF THE PROGRESS TOWARD GOALS   Diet: Regular  Nutrition Supplement/Support  Ensure Enlive bid   Intake: 100%       NEW FINDINGS   None at this time    ANTHROPOMETRICS  Admission wt: 50kg  Most Recent Weight: 50kg        PHYSICAL FINDINGS  Edema-nothing new charted   GI-WDL  Skin-no skin breakdwon noted     LABS  Reviewed,     MEDICATIONS  Reviewed, lasix, culturelle, renal MVI      NUTRITION DIAGNOSIS  Inadequate oral intakes related to poor appetite as evidenced by severe weight loss over the past 6 months, <50% meal intakes x2 weeks.  --evaluation: ongoing       Goals:  Patient to consume % of nutritionally adequate meals three times per day, or the equivalent with supplements/snacks.-met  Improvement in renal labs -met discontinue   Lytes WNL-met, discontinue       INTERVENTIONS  Implementation  No changes at this time       Monitoring/Evaluation  Progress toward goals will be monitored and evaluated per protocol.

## 2022-04-18 NOTE — PROGRESS NOTES
"INFECTIOUS DISEASE FOLLOW UP NOTE      ASSESSMENT:  1. Large Cavitary pneumonia. Abscess. Cavitary Mass? Active issue- improving  2. Rapid progression into cavitation in hospital while on Zosyn. Radiographic changes may not necessarily equate to antibiotic failure -- likely strep, anaerobes, sometimes gram negatives.   3. With elevated procalcitonin and significant leukocytosis and poor dentition this suggests polymicrobial bacterial infection  4. However what is concerning is progressive decline over many months and 40 pound weight loss which could signal either malignancy or chronic infection such as mycobacterial or fungal, with mediastinal lymphadenopathy visualized on CT chest.           PLAN:  1. On meropenem, expect IV course-4-6 weeks per Dr. Travis,  with imaging follow up. Unless micro directs us otherwise.   2. Checking for TB, in isolation pending this. Immediate family can visit, masking per Dr. Travis.   3. Oral vancomycin for c diff. Added Fidaxomicin as worsening WBC  4. Pulmonary consult reviewed?  Bronchoscopy once AFB smear negative x 3?   5. Fungal assays  6. Patient new to me on 4/18/2022. Please see previous notes by Emir Travis MD    Updated patient and patient's nurse    Lady Latif MD  [facility]  [phone]        ______________________________________________________________________    SUBJECTIVE / INTERVAL HISTORY:   Doing slightly better. Awaiting test results    Previous note: coughs. Temps ok. Stools forming up.   No known TB exposures. Previous travel to [place] years ago.       ROS: deconditioned. All other systems negative except as listed above.        OBJECTIVE:  /56 (BP Location: Left arm)   Pulse 80   Temp 97.6  F (36.4  C) (Oral)   Resp 20   Ht 1.499 m (4' 11\")   Wt 50.3 kg (110 lb 12.8 oz)   SpO2 95%   BMI 22.38 kg/m         Vital Signs  Temp: 97.5  F (36.4  C)  Temp src: Oral  Resp: 18  Pulse: 75  Pulse Rate Source: " Monitor  BP: 134/60  BP Location: Left arm    Temp (24hrs), Av.5  F (36.4  C), Min:97.2  F (36.2  C), Max:98.1  F (36.7  C)    Exam on 2022    GEN: No acute distress.  NC O2.   RESPIRATORY:  Normal breathing pattern. Decreased left side  CARDIOVASCULAR:  Regular rate and rhythm. Normal S1 and S2.   ABDOMEN:  Soft, normal bowel sounds, non-tender, no masses.  EXTREMITIES: No edema.  SKIN/HAIR/NAILS:  No rashes. Ecchymosis.   IV: peripheral        Antibiotics:  Meropenem 4/15-  Vancomycin PO -    previous  Pip/tazo -  Augmentin -  Doxy -  Levofloxacin     Pertinent labs:    Recent Labs   Lab 22  0741 22  0455 22  0636   WBC 31.4* 23.4* 20.1*   HGB 10.2* 11.2* 9.8*   HCT 31.4* 34.4* 29.7*    418 307        Recent Labs   Lab 22  0741 22  0455 22  0636    141 141   CO2 28 28 30   BUN 20 20 23        Lab Results   Component Value Date    CRP 7.0 (H) 2022    CRP 7.0 (H) 2022    CRP 8.7 (H) 2022       Lab Results   Component Value Date    ALT 12 2021    AST 23 2021    ALKPHOS 51 2022         MICROBIOLOGY DATA:  c diff positive   Blood negative ,  sputum mixedflora on gram stain -- culture not done due to squamous epi cells    sputum too many squamous cells  AFB pending  Crypto negative  aspergillus pending    RADIOLOGY:  NM Lung Scan Perfusion Particulate    Result Date: 2022  EXAM: NM LUNG SCAN PERFUSION PARTICULATE LOCATION: [facility] DATE/TIME: 2022 5:18 PM INDICATION: PE suspected, high prob COMPARISON: Chest x-ray 2022, V/Q scan 2020, CT chest exam 2019 TECHNIQUE: 7.9 mCi technetium-99m MAA, IV. Standard lung perfusion imaging. FINDINGS: Heterogeneous perfusion throughout the left lung with numerous small subsegmental perfusion defects similar to the previous VQ scan. Perfusion to the right lung is more homogeneous, with a few  subtle areas of decreased activity also similar to the prior exam. Chest x-ray from today demonstrates emphysema with diffuse opacities in the left upper and mid left lung suggestive of pneumonia.     IMPRESSION: 1.  Chronic perfusion abnormalities throughout both lungs similar to the previous exam. No new perfusion abnormalities. Recent chest x-ray is suggestive of left mid and upper lung pneumonia.    Echocardiogram Complete    Result Date: 2022  [id number]^TEREZA^GERALD^[place] [address]  Name: JUDY ZARCO MRN: [id number] : 1945 Study Date: 2022 02:58 PM Age: 77 yrs Gender: Female Patient Location: [facility] Reason For Study: Syncope Ordering Physician: GERALD STARKS Performed By: SUGEY  BSA: 1.4 m2 Height: 59 in Weight: 112 lb ______________________________________________________________________________ Procedure Complete Portable Echo Adult. ______________________________________________________________________________ Interpretation Summary  1. The left ventricle is normal in size. Left ventricular systolic performance is moderately reduced. The ejection fraction is estimated to be 35-40%. 2. There is moderate global reduction in left ventricular systolic performance. 3. There is abnormal septal motion possibly related to altered electrical activation due to bundle branch block. 4. There is mild-moderate, to perhaps moderate, tricuspid insufficiency. 5. Normal right ventricular size with mildly reduced right ventricular systolic performance. 6. There is mild right atrial enlargement. 7. Right ventricular systolic pressure relative to right atrial pressure is mildly increased. The pulmonary artery pressure is estimated to be 45-50 mmHg plus right atrial pressure (the IVC is mildly dilated).  When compared to the prior real-time echocardiogram dated 2021, there has been a mild further diminution in left ventricular  systolic performance (it is this reader's impression that left ventricular systolic performance was mild-moderately reduced with an ejection fraction 40-45% on the prior examination). ______________________________________________________________________________ Left ventricle: The left ventricle is normal in size. Left ventricular systolic performance is moderately reduced. The ejection fraction is estimated to be 35-40%. There is moderate global reduction in left ventricular systolic performance. There is abnormal septal motion possibly related to altered electrical activation due to bundle branch block. Left ventricular wall thickness is normal. 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  Assessment of left atrial pressure (LAP): The cumulative findings are indeterminate in evaluating left atrial pressure.  Right ventricle: Normal right ventricular size with mildly reduced right ventricular systolic performance.  Left atrium: There is borderline left atrial enlargement.  Right atrium: There is mild right atrial enlargement.  IVC: The IVC is mildly dilated.  Aortic valve: The aortic valve is comprised of three cusps.  Mitral valve: There is mild mitral annular calcification. There is trace mitral insufficiency.  Tricuspid valve: The tricuspid valve is grossly morphologically normal. There is mild-moderate, to perhaps moderate, tricuspid insufficiency.  Pulmonic valve: The pulmonic valve is grossly morphologically normal.  Thoracic aorta: The aortic root and proximal ascending aorta are of normal dimension.  Pericardium: There is no significant pericardial effusion. ______________________________________________________________________________ ______________________________________________________________________________ MMode/2D Measurements & Calculations RVDd: 3.5 cm IVSd: 0.98 cm LVIDd: 4.1 cm LVIDs: 3.3 cm LVPWd: 0.90 cm FS: 19.4 % LV mass(C)d: 122.9 grams LV mass(C)dI: 85.3  grams/m2 Ao root diam: 2.8 cm LA dimension: 3.5 cm asc Aorta Diam: 2.8 cm LA/Ao: 1.2 LVOT diam: 1.8 cm LVOT area: 2.6 cm2  LA Volume Indexed (AL/bp): 28.8 ml/m2 RWT: 0.44  Time Measurements MM HR: 98.0 BPM  Doppler Measurements & Calculations MV E max carolyn: 89.4 cm/sec MV A max carolyn: 135.9 cm/sec MV E/A: 0.66 MV dec time: 0.12 sec LV V1 max PG: 3.2 mmHg LV V1 max: 89.2 cm/sec LV V1 VTI: 18.3 cm SV(LVOT): 48.2 ml SI(LVOT): 33.4 ml/m2 TR max carolyn: 348.4 cm/sec TR max P.6 mmHg E/E' av.9 Lateral E/e': 11.0 Medial E/e': 16.8  ______________________________________________________________________________ Report approved by: Deepak Anderson 2022 04:24 PM       US Lower Extremity Venous Duplex Bilateral    Result Date: 2022  EXAM: US LOWER EXTREMITY VENOUS DUPLEX BILATERAL LOCATION: [facility] DATE/TIME: 2022 3:49 PM INDICATION: hypoxia, hypotension; leg swelling COMPARISON: None. TECHNIQUE: Venous Duplex ultrasound of bilateral lower extremities with and without compression, augmentation and duplex. Color flow and spectral Doppler with waveform analysis performed. FINDINGS: Exam includes the common femoral, femoral, popliteal veins as well as segmentally visualized deep calf veins and greater saphenous vein. RIGHT: No deep vein thrombosis. No superficial thrombophlebitis. No popliteal cyst. LEFT: No deep vein thrombosis. No superficial thrombophlebitis. No popliteal cyst.     IMPRESSION: 1.  No deep venous thrombosis in the bilateral lower extremities.    XR Chest Port 1 View    Result Date: 2022  EXAM: XR CHEST PORT 1 VIEW LOCATION: [facility] DATE/TIME: 2022 10:22 AM INDICATION: Worsening shortness of breath, coarse breath sounds. COMPARISON: 04/10/2022. Others.     IMPRESSION: Persistent dense left upper lobe consolidation. The surrounding opacities are stable to perhaps marginally improved, though the dense consolidative  abnormality is without substantial change. Increased lucency superior to the consolidation could be from aerated lung or developing cavitation. Recommend continued follow-up. Small left pleural effusion. Right lung is grossly clear. Stable cardiomediastinal silhouette. Atherosclerosis. Right PICC tip near the distal SVC. NOTE: ABNORMAL REPORT THE DICTATION ABOVE DESCRIBES AN ABNORMALITY FOR WHICH FOLLOW-UP IS NEEDED.     XR Chest Port 1 View    Result Date: 4/10/2022  EXAM: XR CHEST PORTABLE 1 VIEW LOCATION: [facility] DATE/TIME: 04/10/2022, 7:56 AM INDICATION: Shortness of breath. COMPARISON: CT of the chest, abdomen, and pelvis performed 04/08/2022. Chest radiograph performed 04/07/2022.     IMPRESSION: Ill-defined consolidation and infiltrate in the left upper lobe is again noted. Mild hyperinflation both lungs. The right lung is otherwise clear. Tortuous calcified thoracic aorta. Heart size has increased in prominence compared to 04/07/2022. Pulmonary vascularity is within normal limits where seen. Old right rib fractures. Right PICC line has been removed.     XR Chest Port 1 View    Result Date: 4/7/2022  EXAM: XR CHEST PORT 1 VIEW LOCATION: [facility] DATE/TIME: 4/7/2022 4:49 PM INDICATION: Cough. COMPARISON: 06/02/2021.     IMPRESSION: New moderate opacity in the left mid to upper lung suggestive of pneumonia given history of cough. However, this requires follow-up to complete resolution to exclude an underlying lesion (6-8 week follow-up radiographs). Right PICC tip near the cavoatrial junction. Normal heart size. Atherosclerosis. No significant pleural effusion.     CT Chest Abdomen Pelvis w/o Contrast    Result Date: 4/8/2022  EXAM: CT CHEST ABDOMEN PELVIS W/O CONTRAST LOCATION: [facility] DATE/TIME: 4/8/2022 2:17 PM INDICATION: History of breast cancer. Pneumonia. Weight loss. COMPARISON: CT chest 6/12/2020, CT  chest/abdomen/pelvis 6/11/2019 TECHNIQUE: CT scan of the chest, abdomen, and pelvis was performed without IV contrast. Multiplanar reformats were obtained. Dose reduction techniques were used. CONTRAST: None. FINDINGS: LUNGS AND PLEURA: Moderate irregular consolidation in the posterior left upper lobe. Confluent emphysema. Mild bibasilar atelectasis/scar. Trace pleural effusions. Stable mild diffuse bronchial wall thickening. A few stable benign pulmonary nodules. MEDIASTINUM/AXILLAE: Right PICC. Mediastinal lymphadenopathy with the largest largest being a right paratracheal node measuring 1.6 cm in short axis, image 48:3. Small pericardial effusion. The main pulmonary artery is enlarged at 3.0 cm which may be seen with pulmonary artery hypertension. CORONARY ARTERY CALCIFICATION: Moderate. HEPATOBILIARY: Normal. PANCREAS: Normal. SPLEEN: Normal. ADRENAL GLANDS: Normal. KIDNEYS/BLADDER: Low-lying right kidney. Subcentimeter renal hypodensities which are too small to characterize. No hydronephrosis. BOWEL: No obstruction or inflammatory change. Normal appendix. LYMPH NODES: Normal. VASCULATURE: Stable ectasia of the abdominal aorta measuring 2.9 x 2.3 cm, image 169:3. Moderate atherosclerosis. PELVIC ORGANS: Normal. MUSCULOSKELETAL: Left hip arthroplasty. Osseous demineralization. Degenerative changes of the spine. Remote left rib fractures. T12 compression fracture with 80% loss of height which is likely remote.     IMPRESSION: 1.  Moderate left upper lobe consolidation. This is consistent with pneumonia. Recommend a follow-up chest radiograph in 4-6 weeks to document resolution. 2.  Mediastinal lymphadenopathy. 3.  No acute abnormality in the abdomen or pelvis.    CT Chest w/o Contrast    Result Date: 4/15/2022  EXAM: CT CHEST W/O CONTRAST LOCATION: [facility] DATE/TIME: 4/15/2022 1:21 PM INDICATION: Pneumonia, effusion or abscess suspected, xray done COMPARISON: CT 4/8/2022 TECHNIQUE: CT  chest without IV contrast. Multiplanar reformats were obtained. Dose reduction techniques were used. CONTRAST: None. FINDINGS: LUNGS AND PLEURA: There is now prominent cavity within the consolidated focus left upper lobe. Cavity measures approximately 5 cm with surrounding thick wall and lung consolidation surrounding fat. Mild increase in overall size of left upper lobe involvement. Tiny left pleural effusion now present. Minimal linear density at lung bases. Slight mucus plugging seen posterior right lower lobe. Prominent diffuse changes of emphysema. MEDIASTINUM/AXILLAE: Small reactive lymph nodes are stable. Heavy atherosclerotic calcifications of aorta. CORONARY ARTERY CALCIFICATION: Moderate. UPPER ABDOMEN: Diffuse atherosclerotic calcifications. MUSCULOSKELETAL: Scoliosis and severe compression of T12 unchanged     IMPRESSION: 1.  There is new cavity formation involving the left upper lobe pneumonia with a 5 cm cavity now present within the densely consolidated lung. 2.  Diffuse emphysema unchanged.

## 2022-04-18 NOTE — PLAN OF CARE
Problem: COPD (Chronic Obstructive Pulmonary Disease) Comorbidity  Goal: Maintenance of COPD Symptom Control  Outcome: Ongoing, Not Progressing  Intervention: Maintain COPD-Symptom Control  Recent Flowsheet Documentation  Taken 4/17/2022 2100 by Tanner Webster RN  Medication Review/Management: medications reviewed     Problem: Hypertension Comorbidity  Goal: Blood Pressure in Desired Range  Outcome: Ongoing, Progressing  Intervention: Maintain Blood Pressure Management  Recent Flowsheet Documentation  Taken 4/17/2022 2100 by Tanner Webster RN  Medication Review/Management: medications reviewed     Pt stated approx 5am, sob. RT called. Gave PRN nebs. Pt still respirations in 20's. Called BFM to give prednisone, erum. Updated BFM on anxiety of pt. Pt severely anxious bc of wait to the commode. Able to make it to the commode and back to bed. Difficult for pt to calm down. Paged BFM resident 0.5 ativan given. Pt shiver d/t being cold, auxillary temp 97.5. Gave pt warmer water to drink which helped calm and warm pt up. Pt c/o muscle spasms, small one in back of neck and Right hip. Rating 9/10. 1000 mg prn tylenol given, somewhat effective. Pt requested ultram 50 mg. Page BFM to okay given ultram earlier. Gave at approx 0650. Pt sleeping by 0730 and calm, stated that ultram helped. O2 sats range from 90%-95% on baseline 2 Liters. Pt stated she would like to stay at baseline O2 Liters when asked.  Pt had increased BP's., contact BFM, 5 mg hydralazine prn given.

## 2022-04-19 ENCOUNTER — APPOINTMENT (OUTPATIENT)
Dept: OCCUPATIONAL THERAPY | Facility: CLINIC | Age: 77
DRG: 177 | End: 2022-04-19
Payer: MEDICARE

## 2022-04-19 LAB
ALBUMIN SERPL-MCNC: 1.7 G/DL (ref 3.5–5)
ALP SERPL-CCNC: 54 U/L (ref 45–120)
ALT SERPL W P-5'-P-CCNC: 11 U/L (ref 0–45)
ANION GAP SERPL CALCULATED.3IONS-SCNC: 6 MMOL/L (ref 5–18)
AST SERPL W P-5'-P-CCNC: 14 U/L (ref 0–40)
BASOPHILS # BLD AUTO: 0 10E3/UL (ref 0–0.2)
BASOPHILS NFR BLD AUTO: 0 %
BILIRUB SERPL-MCNC: 0.3 MG/DL (ref 0–1)
BUN SERPL-MCNC: 20 MG/DL (ref 8–28)
C REACTIVE PROTEIN LHE: 5.7 MG/DL (ref 0–0.8)
CALCIUM SERPL-MCNC: 7.8 MG/DL (ref 8.5–10.5)
CHLORIDE BLD-SCNC: 106 MMOL/L (ref 98–107)
CO2 SERPL-SCNC: 30 MMOL/L (ref 22–31)
CREAT SERPL-MCNC: 0.94 MG/DL (ref 0.6–1.1)
EOSINOPHIL # BLD AUTO: 0.1 10E3/UL (ref 0–0.7)
EOSINOPHIL NFR BLD AUTO: 0 %
ERYTHROCYTE [DISTWIDTH] IN BLOOD BY AUTOMATED COUNT: 15 % (ref 10–15)
GFR SERPL CREATININE-BSD FRML MDRD: 62 ML/MIN/1.73M2
GLUCOSE BLD-MCNC: 86 MG/DL (ref 70–125)
HCT VFR BLD AUTO: 27.5 % (ref 35–47)
HGB BLD-MCNC: 8.9 G/DL (ref 11.7–15.7)
IMM GRANULOCYTES # BLD: 0.2 10E3/UL
IMM GRANULOCYTES NFR BLD: 1 %
LYMPHOCYTES # BLD AUTO: 1.8 10E3/UL (ref 0.8–5.3)
LYMPHOCYTES NFR BLD AUTO: 10 %
MAGNESIUM SERPL-MCNC: 2 MG/DL (ref 1.8–2.6)
MCH RBC QN AUTO: 28.3 PG (ref 26.5–33)
MCHC RBC AUTO-ENTMCNC: 32.4 G/DL (ref 31.5–36.5)
MCV RBC AUTO: 87 FL (ref 78–100)
MONOCYTES # BLD AUTO: 1.9 10E3/UL (ref 0–1.3)
MONOCYTES NFR BLD AUTO: 10 %
NEUTROPHILS # BLD AUTO: 14.7 10E3/UL (ref 1.6–8.3)
NEUTROPHILS NFR BLD AUTO: 79 %
NRBC # BLD AUTO: 0 10E3/UL
NRBC BLD AUTO-RTO: 0 /100
PHOSPHATE SERPL-MCNC: 3.4 MG/DL (ref 2.5–4.5)
PLATELET # BLD AUTO: 342 10E3/UL (ref 150–450)
POTASSIUM BLD-SCNC: 3.8 MMOL/L (ref 3.5–5)
PROT SERPL-MCNC: 4.8 G/DL (ref 6–8)
RBC # BLD AUTO: 3.15 10E6/UL (ref 3.8–5.2)
SODIUM SERPL-SCNC: 142 MMOL/L (ref 136–145)
WBC # BLD AUTO: 18.7 10E3/UL (ref 4–11)

## 2022-04-19 PROCEDURE — 250N000009 HC RX 250: Performed by: FAMILY MEDICINE

## 2022-04-19 PROCEDURE — 86140 C-REACTIVE PROTEIN: CPT | Performed by: STUDENT IN AN ORGANIZED HEALTH CARE EDUCATION/TRAINING PROGRAM

## 2022-04-19 PROCEDURE — 99233 SBSQ HOSP IP/OBS HIGH 50: CPT | Performed by: INTERNAL MEDICINE

## 2022-04-19 PROCEDURE — 250N000013 HC RX MED GY IP 250 OP 250 PS 637

## 2022-04-19 PROCEDURE — 99232 SBSQ HOSP IP/OBS MODERATE 35: CPT | Mod: GC

## 2022-04-19 PROCEDURE — 97535 SELF CARE MNGMENT TRAINING: CPT | Mod: GO

## 2022-04-19 PROCEDURE — 250N000011 HC RX IP 250 OP 636

## 2022-04-19 PROCEDURE — 94640 AIRWAY INHALATION TREATMENT: CPT | Mod: 76

## 2022-04-19 PROCEDURE — 250N000012 HC RX MED GY IP 250 OP 636 PS 637

## 2022-04-19 PROCEDURE — 85025 COMPLETE CBC W/AUTO DIFF WBC: CPT | Performed by: STUDENT IN AN ORGANIZED HEALTH CARE EDUCATION/TRAINING PROGRAM

## 2022-04-19 PROCEDURE — 80053 COMPREHEN METABOLIC PANEL: CPT | Performed by: INTERNAL MEDICINE

## 2022-04-19 PROCEDURE — 99231 SBSQ HOSP IP/OBS SF/LOW 25: CPT | Performed by: INTERNAL MEDICINE

## 2022-04-19 PROCEDURE — 250N000013 HC RX MED GY IP 250 OP 250 PS 637: Performed by: STUDENT IN AN ORGANIZED HEALTH CARE EDUCATION/TRAINING PROGRAM

## 2022-04-19 PROCEDURE — 120N000001 HC R&B MED SURG/OB

## 2022-04-19 PROCEDURE — 999N000157 HC STATISTIC RCP TIME EA 10 MIN

## 2022-04-19 PROCEDURE — 250N000013 HC RX MED GY IP 250 OP 250 PS 637: Performed by: INTERNAL MEDICINE

## 2022-04-19 PROCEDURE — 94640 AIRWAY INHALATION TREATMENT: CPT

## 2022-04-19 PROCEDURE — 250N000009 HC RX 250: Performed by: STUDENT IN AN ORGANIZED HEALTH CARE EDUCATION/TRAINING PROGRAM

## 2022-04-19 PROCEDURE — 250N000013 HC RX MED GY IP 250 OP 250 PS 637: Performed by: FAMILY MEDICINE

## 2022-04-19 PROCEDURE — 84100 ASSAY OF PHOSPHORUS: CPT

## 2022-04-19 PROCEDURE — 83735 ASSAY OF MAGNESIUM: CPT

## 2022-04-19 PROCEDURE — 250N000011 HC RX IP 250 OP 636: Performed by: STUDENT IN AN ORGANIZED HEALTH CARE EDUCATION/TRAINING PROGRAM

## 2022-04-19 RX ORDER — MAGNESIUM SULFATE HEPTAHYDRATE 40 MG/ML
2 INJECTION, SOLUTION INTRAVENOUS ONCE
Status: COMPLETED | OUTPATIENT
Start: 2022-04-19 | End: 2022-04-19

## 2022-04-19 RX ADMIN — TAMOXIFEN CITRATE 20 MG: 10 TABLET ORAL at 21:00

## 2022-04-19 RX ADMIN — FIDAXOMICIN 200 MG: 200 TABLET, FILM COATED ORAL at 08:32

## 2022-04-19 RX ADMIN — Medication 1 CAPSULE: at 21:00

## 2022-04-19 RX ADMIN — ACETAMINOPHEN 1000 MG: 500 TABLET, FILM COATED ORAL at 02:18

## 2022-04-19 RX ADMIN — Medication 1 CAPSULE: at 08:32

## 2022-04-19 RX ADMIN — VANCOMYCIN HYDROCHLORIDE 125 MG: 125 CAPSULE ORAL at 12:21

## 2022-04-19 RX ADMIN — VANCOMYCIN HYDROCHLORIDE 125 MG: 125 CAPSULE ORAL at 08:32

## 2022-04-19 RX ADMIN — SIMVASTATIN 40 MG: 40 TABLET, FILM COATED ORAL at 21:00

## 2022-04-19 RX ADMIN — FIDAXOMICIN 200 MG: 200 TABLET, FILM COATED ORAL at 20:59

## 2022-04-19 RX ADMIN — VANCOMYCIN HYDROCHLORIDE 125 MG: 125 CAPSULE ORAL at 17:12

## 2022-04-19 RX ADMIN — GUAIFENESIN 1200 MG: 600 TABLET ORAL at 08:32

## 2022-04-19 RX ADMIN — GUAIFENESIN 1200 MG: 600 TABLET ORAL at 21:00

## 2022-04-19 RX ADMIN — GABAPENTIN 200 MG: 100 CAPSULE ORAL at 08:32

## 2022-04-19 RX ADMIN — SODIUM CHLORIDE SOLN NEBU 3% 3 ML: 3 NEBU SOLN at 15:32

## 2022-04-19 RX ADMIN — IPRATROPIUM BROMIDE AND ALBUTEROL SULFATE 3 ML: 2.5; .5 SOLUTION RESPIRATORY (INHALATION) at 12:21

## 2022-04-19 RX ADMIN — SODIUM CHLORIDE SOLN NEBU 3% 3 ML: 3 NEBU SOLN at 12:21

## 2022-04-19 RX ADMIN — MAGNESIUM SULFATE HEPTAHYDRATE 2 G: 2 INJECTION, SOLUTION INTRAVENOUS at 06:38

## 2022-04-19 RX ADMIN — SODIUM CHLORIDE SOLN NEBU 3% 3 ML: 3 NEBU SOLN at 20:05

## 2022-04-19 RX ADMIN — SODIUM CHLORIDE SOLN NEBU 3% 3 ML: 3 NEBU SOLN at 08:42

## 2022-04-19 RX ADMIN — PREDNISONE 10 MG: 5 TABLET ORAL at 08:32

## 2022-04-19 RX ADMIN — METOPROLOL SUCCINATE 75 MG: 50 TABLET, EXTENDED RELEASE ORAL at 20:59

## 2022-04-19 RX ADMIN — B-COMPLEX W/ C & FOLIC ACID TAB 1 MG 1 TABLET: 1 TAB at 08:32

## 2022-04-19 RX ADMIN — IPRATROPIUM BROMIDE AND ALBUTEROL SULFATE 3 ML: 2.5; .5 SOLUTION RESPIRATORY (INHALATION) at 20:05

## 2022-04-19 RX ADMIN — TRAMADOL HYDROCHLORIDE 50 MG: 50 TABLET, COATED ORAL at 08:32

## 2022-04-19 RX ADMIN — TRAMADOL HYDROCHLORIDE 50 MG: 50 TABLET, COATED ORAL at 21:00

## 2022-04-19 RX ADMIN — FAMOTIDINE 20 MG: 20 TABLET ORAL at 21:00

## 2022-04-19 RX ADMIN — GABAPENTIN 300 MG: 300 CAPSULE ORAL at 20:58

## 2022-04-19 RX ADMIN — MEROPENEM 1 G: 1 INJECTION, POWDER, FOR SOLUTION INTRAVENOUS at 04:37

## 2022-04-19 RX ADMIN — IPRATROPIUM BROMIDE AND ALBUTEROL SULFATE 3 ML: 2.5; .5 SOLUTION RESPIRATORY (INHALATION) at 15:32

## 2022-04-19 RX ADMIN — RIVAROXABAN 15 MG: 15 TABLET, FILM COATED ORAL at 21:00

## 2022-04-19 RX ADMIN — VANCOMYCIN HYDROCHLORIDE 125 MG: 125 CAPSULE ORAL at 21:01

## 2022-04-19 RX ADMIN — MEROPENEM 1 G: 1 INJECTION, POWDER, FOR SOLUTION INTRAVENOUS at 17:13

## 2022-04-19 RX ADMIN — IPRATROPIUM BROMIDE AND ALBUTEROL SULFATE 3 ML: 2.5; .5 SOLUTION RESPIRATORY (INHALATION) at 08:41

## 2022-04-19 RX ADMIN — FUROSEMIDE 20 MG: 20 TABLET ORAL at 08:32

## 2022-04-19 ASSESSMENT — ACTIVITIES OF DAILY LIVING (ADL)
ADLS_ACUITY_SCORE: 11

## 2022-04-19 NOTE — PROGRESS NOTES
Care Management Follow Up    Length of Stay (days): 12    Expected Discharge Date: 04/20/2022     Concerns to be Addressed: IV ABX, TCU placement   Patient plan of care discussed at interdisciplinary rounds: Yes    Anticipated Discharge Disposition: TCU      Anticipated Discharge Services: TCU   Anticipated Discharge DME: None    Patient/family educated on Medicare website which has current facility and service quality ratings: Yes   Education Provided on the Discharge Plan:  Yes   Patient/Family in Agreement with the Plan: yes    Referrals Placed by CM/SW:  TCU   Private pay costs discussed: Yes     Additional Information:  MAIKOL spoke with Pt and would like TCU as she was told she will need IV ABX . Pt states that she will need PT and OT also.  Pt has been to [facility] and would like to return.  MAIKOL called and spoke with Ally and will assess.  MAIKOL sent new referral. PAS needed.       BRADEN Weber

## 2022-04-19 NOTE — PROGRESS NOTES
04/18/22 2035   Tech Time   $Tech Time (10 minute increments) 3   Vital Signs   Resp 17   Pulse 87   Pulse Rate Source Monitor   Oxygen Therapy   SpO2 96 %   O2 Device Nasal cannula   Oxygen Delivery 2 LPM   Breath Sounds   All Lung Fields Breath Sounds coarse;diminished   Nebulizer Assessment & Treatment   $RT Use ONLY Delivery Method Nebulizer - Additional   Daily Review of Necessity (SVN) completed   Nebulizer Device Mouthpiece   Pretreatment Heart Rate (beats/min) 87   Pretreatment Resp Rate (breaths/min) 17   Pretreatment O2 sats - (TCU only) 94   Pretreat Breath Sounds - Bilat - All Lobes diminished   Breath Sounds Post-Respiratory Treatment   Posttreatment Heart Rate (beats/min) 90   Posttreatment Resp Rate (breaths/min) 20   Post treatment O2 Sats - (TCU only) 100   Posttreatment Assessment (SVN) breath sounds unchanged   Breath Sounds Posttreatment All Fields All Fields   Breath Sounds Posttreatment All Fields no change

## 2022-04-19 NOTE — PLAN OF CARE
Goal Outcome Evaluation:      Problem: Plan of Care - These are the overarching goals to be used throughout the patient stay.    Goal: Plan of Care Review/Shift Note         Problem: Heart Failure Comorbidity  Goal: Maintenance of Heart Failure Symptom Control  Outcome: Ongoing, Progressing    Problem: Infection  Goal: Absence of Infection Signs and Symptoms  Outcome: Ongoing, Progressing    Patient denies pain, except for at around 0200 complained of headache, which is controlled with Tylenol.  Up with assist of 1.  O2 at 2L with sats in the mid 90's when spot checked. Patient continues to have congested cough and LU Vitals stable.   Tearful x1 overnight.

## 2022-04-19 NOTE — PROGRESS NOTES
[facility]    Progress Note - Hospitalist Service       Date of Admission:  4/7/2022    Assessment & Plan    Irene León is a 77 year old female admitted on 4/7/2022 for community acquired pneumonia in the setting of severe COPD on baseline 2L O2 by NC. PMHx also includes HFrEF, LBBB, HTN, factor V Leiden and atrial fibrillation on Xarelto, CKD stage 3, and 35+ lb unintentional weight loss in the past one year. Hospitalization was initially complicated by resolved delirium, followed by frequent stooling and diagnosis of C difficile diarrhea. After completing a course of antibiotics for pneumonia, she experienced acute worsening of respiratory status and repeat CT chest revealed interim development of a 5cm left upper lobe cavitation. Infectious disease and pulmonology were consulted.  Antibiotics were broadened to zosyn then transitioned to meropenem. Discharge plan pending continued improvement and final ID/Pulm recommendations.    Acute Hypoxic Respiratory Failure, improved  Community-acquired left mid and upper lobe pneumonia  Left cavitary lesion on chest CT  COPD Exacerbation  Elevated lactate, improved  Leukocytosis, worsening  Admitted with left upper and mid lobe pneumonia with COPD exacerbation. Has had frequent outpatient antibiotics and steroid tapers. Was recommended to use oxygen 2.5 L at home, but only uses occasional. OP pulmonologist at [facility]. CT Chest 4/8 showed moderate upper lobe consolidation. CXR 4/10 showed continued pneumonia, and WBC, CRP consistently elevated. C-diff+ 4/12. 4/14 CXR showed no improvement. 4/15 Chest CT showing: new cavity formation involving the left upper lobe pneumonia with a 5 cm cavity now present within the densely consolidated lung. ID consulted, pulmonary re-consulted. Concern for abscess, bacterial infection most likely as her WBC is improving with antibiotics. Fungal, TB cultures pending. Will need negative AFB x 3 prior to  discharge.   - Pulmonology consulted, appreciate recs, signed off 4/19   - Bronchoscopy not recommended at this time due to high risk  - Consulted ID for new cavitation, appreciate recs   - IV Meropenem    - TB, Fungal, workup, broad differential   - Sputum culture pending   - Airborne precautions   - Immediate family allowed to visit. Discussed with Nursing Supervisor, Dr. Travis.  - Prednisone taper 30 mg, 20 mg, 10 mg, 5 mg for 3 days each  - Scheduled Duonebs Q4hrs with Mucomyst  - PRN Albuterol Nebs Q2Hrs  - Hold Trelegy Ellipta    - BMP, CBC, CRP daily  - Repeat imaging 4-6 weeks at discharge    Hospitalization Anti-Infective's  Ceftriaxone 1 G 4/7  Doxycyline IV 4/7-4/12->PO Doxycycline 4/13  Zosyn IV 4/7-4/12->Augmentin 4/12-4/13  Levaquin 750 mg PO 4/14, then discontinued  Zosyn 4/15, then discontinued  Meropenem 1 G 4/15->  PO Vanco C-diff 4/12->  Fidaxomicin PO C-diff 4/18->    C-diff Positive  Diarrhea, improved  Patient began having loose stools during admission. C-diff+ 4/11/22.   - Vanco  mg QID x 10 days (4/12-4/21)  - Fidaxomicin PO x 10 days (4/18-)  - Continue probiotic BID    Anxiety  Panic attack  Has had frequent episodes of anxiety overnight. Felt like she had a panic attack and claustrophobia overnight. Was given Ativan 0.5 mg x 1 and was able to sleep, found it helpful. She received Olanzapine x 1 during a delirium episode early in the admission which was also helpful. She would like to start something for anxiety while in the hospital. Do to prolonged QT with Vtach episode, limited in anti anxiety medication options.  Ativan p.o. previously given.  - Call daughter Leelee, she will attempt to calm her down  - Continue integrative therapies     Afib w/ RVR on Xarelto  Prolonged QTC  Hx TIA, Factor V Leiden  Metoprolol was initially on hold for hypotension, now resumed. Did have episode of Afib w RVR to 160's day 1 after Metoprolol was held for hypotension.  - Continuous telemetry  discontinued  - Avoid QT prolonging medications  -Trend and replete electrolytes as needed, K goal >4.0, Mg goal >2.5  - Continue PTA Metoprolol  - Continue Xarelto as at home  - EKG to check QT PRN    HFrEF   HTN  PTA took furosemide 80 mg total daily.  Received IV Lasix 20 mg 4/10.  Echo showed reduced EF from prior.  Hyperinflated lungs without radiographic evidence to suggest heart failure exacerbation on CXR 4/10.  Could consider stress test prior to discharge pending clinical improvement.  - Continue Lasix 20 mg daily to help with frequent urination (vs PTA 40 mg)  - Continue PTA Metoprolol to 75 mg XL daily   - Daily weights  - Strict I/O, pure wick if patient tolerates    Weight loss  Multifactorial in the setting of COPD, decreased oral intake.  Currently at weight from admission.  -RD consulted, appreciate recommendations and assistance     ANATOLIY on CKD, Improved  Admission Cr 2.44 BUN 36 with a variable baseline 1.5-2.0.    - Daily BMP  - Avoid nephrotoxins    Delirium, Resolved  Agitation  Gabapentin was decreased to 200 mg twice daily.  Vesicare and Myrbetriq were discontinued.      Overactive bladder  Discontinued PTA Mirabegron 50 mg and Solifenacin 5 mg in the setting of acute delirium.    Fragile skin  -Wrap IV, avoid adhesives  -WOC  - Calmoseptine PRN    Goals of Care  DNR/DNI  Overall, poor long term prognosis. Has severe COPD with frequent recurrent infections. Patient expressed not wanting to return to hospital. Daughter, Leelee, is helping with her healthcare management.   - Palliative care consulted, appreciate recs, signed off 4/14  - DNR/DNI    Chronic Conditions and Medications  Breast Cancer  DCIS, ER/NE positive, HER-2 negative, s/p left breast lumpectomy and left axillary sentinel lymph node biopsy.  - Cont. PTA Tamoxifen 20 daily  Osteoporosis  - Hold PTA VitD and Ca supplementation  Allergies  - Hold PTA Cetirizine and Flonase  GERD  - Cont. PTA 20 mg pepcid at bedtime  Normocytic  normochromic anemia, stable  Admission Hgb of 10.9.  Hgbs 13.3 - 9.8 in the 2 months PTA. No ssx GI bleed.  -CBC as above  Chronic Pain secondary to T12 compression fracture  - Continue gabapentin 200 mg BID (dose decreased this admission)  - Continue Tramadol 50 mg Q12h as at home     Diet: Snacks/Supplements Adult: Ensure Enlive; With Meals  Regular Diet Adult    DVT Prophylaxis: Xarelto  Hayes Catheter: Not present  Fluids: PO  Central Lines: PRESENT  PICC Double Lumen 04/11/22 Right Brachial vein medial-Site Assessment: WDL  Cardiac Monitoring: None  Code Status: No CPR- Do NOT Intubate      Disposition Plan   Expected Discharge: 2-3 days   Anticipated discharge location: home with family;home with help/services       The patient's care was discussed with the Attending Physician, Dr. Irene Mukherjee.     Discussed with Leelee on the phone.     Irene Lombardo MD   [facility]  [facility]  _______________________________________________________    Interval History    Patient feeling better when I saw her, had just gotten her neb for the morning. Very grateful for her nurse's care. Stool better, no pain.     Physical Exam   Vital Signs: Temp: 97.7  F (36.5  C) Temp src: Oral BP: 137/65 Pulse: 68   Resp: 18 SpO2: 94 % O2 Device: Nasal cannula Oxygen Delivery: 2 LPM  Weight: 112 lbs 12.8 oz  Physical Exam   Constitutional: Awake, alert, cooperative, oriented to person, place, and day. Appears anxious  Eyes: Lids and lashes normal, extra ocular muscles intact, sclera clear, conjunctiva normal.  ENT: Normocephalic, without obvious abnormality, atraumatic  Lungs:  LS left mid/base posterior with coarse breath sounds. Right clear, but poor air exchange  Cardiovascular: Regular rate and rhythm, normal S1 and S2  Abdomen: Soft, non tender, no distention or guarding  Neurologic: Awake and alert. Cranial nerves II-XII are grossly intact.  Neuropsychiatric: Normal affect, mood, orientation,  memory seems poor      Data   Recent Labs   Lab 04/19/22  0444 04/18/22  0741 04/17/22  0455   WBC 18.7* 31.4* 23.4*   HGB 8.9* 10.2* 11.2*   MCV 87 86 87    414 418    142 141   POTASSIUM 3.8 3.6 4.4   CHLORIDE 106 105 104   CO2 30 28 28   BUN 20 20 20   CR 0.94 0.91 0.94   ANIONGAP 6 9 9   MESSI 7.8* 8.0* 8.2*   GLC 86 83 71   ALBUMIN 1.7*  --   --    PROTTOTAL 4.8*  --   --    BILITOTAL 0.3  --   --    ALKPHOS 54  --   --    ALT 11  --   --    AST 14  --   --      No results found for this or any previous visit (from the past 24 hour(s)).  Medications     - MEDICATION INSTRUCTIONS -         famotidine  20 mg Oral At Bedtime     fidaxomicin  200 mg Oral BID     fluticasone  1 spray Both Nostrils Daily     [Held by provider] fluticasone-vilanterol  1 puff Inhalation At Bedtime    And     [Held by provider] umeclidinium  1 puff Inhalation At Bedtime     furosemide  20 mg Oral Daily     gabapentin  200 mg Oral QAM     gabapentin  300 mg Oral At Bedtime     guaiFENesin  1,200 mg Oral BID     ipratropium - albuterol 0.5 mg/2.5 mg/3 mL  3 mL Nebulization 4x daily     lactobacillus rhamnosus (GG)  1 capsule Oral BID     melatonin  5 mg Oral At Bedtime     meropenem  1 g Intravenous Q12H     metoprolol succinate ER  75 mg Oral At Bedtime     multivitamin RENAL  1 tablet Oral Daily     predniSONE  10 mg Oral Daily    Followed by     [START ON 4/22/2022] predniSONE  5 mg Oral Daily     rivaroxaban ANTICOAGULANT  15 mg Oral At Bedtime     simvastatin  40 mg Oral At Bedtime     sodium chloride  3 mL Nebulization 4x daily     tamoxifen  20 mg Oral QPM     traMADol  50 mg Oral BID     vancomycin  125 mg Oral 4x Daily     CT Chest: 4/8/22  IMPRESSION:  1.  Moderate left upper lobe consolidation. This is consistent with pneumonia. Recommend a follow-up chest radiograph in 4-6 weeks to document resolution.  CT Chest: 4/16/22  1. There is new cavity formation involving the left upper lobe pneumonia with a 5 cm cavity now  present within the densely consolidated lung.

## 2022-04-19 NOTE — PROGRESS NOTES
"INFECTIOUS DISEASE FOLLOW UP NOTE      ASSESSMENT:  1. Large Cavitary pneumonia. Abscess. Cavitary Mass? Active issue- improving  2. Rapid progression into cavitation in hospital while on Zosyn. Radiographic changes may not necessarily equate to antibiotic failure -- likely strep, anaerobes, sometimes gram negatives.   3. With elevated procalcitonin and significant leukocytosis and poor dentition this suggests polymicrobial bacterial infection  4. However what is concerning is progressive decline over many months and 40 pound weight loss which could signal either malignancy or chronic infection such as mycobacterial or fungal, with mediastinal lymphadenopathy visualized on CT chest.           PLAN:  1. On meropenem, expect IV course-4-6 weeks per Dr. Travis,  with imaging follow up. Unless micro directs us otherwise.   2. Checking for TB, in isolation pending this. Immediate family can visit, masking per Dr. Travis.   3. Oral vancomycin for c diff. Added Fidaxomicin as worsening WBC  4. Pulmonary consult reviewed.  No plans for bronchoscopy at this time.    5. Fungal assays  6. Patient new to me on 4/18/2022. Please see previous notes by Emir Travis MD    Updated patient     Lady Latif MD  [facility]  [phone]        ______________________________________________________________________    SUBJECTIVE / INTERVAL HISTORY:   Doing slightly better. Awaiting test results  Discussed labs, count improving    Previous note: coughs. Temps ok. Stools forming up.   No known TB exposures. Previous travel to [place] years ago.       ROS: deconditioned. All other systems negative except as listed above.        OBJECTIVE:  /61 (BP Location: Left arm)   Pulse 73   Temp 97.9  F (36.6  C) (Oral)   Resp 17   Ht 1.499 m (4' 11\")   Wt 50.3 kg (110 lb 12.8 oz)   SpO2 94%   BMI 22.38 kg/m         Vital Signs  Temp: 97.5  F (36.4  C)  Temp src: Oral  Resp: 18  Pulse: 75  Pulse " Rate Source: Monitor  BP: 134/60  BP Location: Left arm    Temp (24hrs), Av.5  F (36.4  C), Min:97.2  F (36.2  C), Max:98.1  F (36.7  C)    Exam on 2022    GEN: No acute distress.  NC O2.   RESPIRATORY:  Normal breathing pattern. Decreased left side  CARDIOVASCULAR:  Regular rate and rhythm. Normal S1 and S2.   ABDOMEN:  Soft, normal bowel sounds, non-tender, no masses.  EXTREMITIES: No edema.  SKIN/HAIR/NAILS:  No rashes. Ecchymosis.   IV: peripheral        Antibiotics:  Meropenem 4/15-  Vancomycin PO -    previous  Pip/tazo -  Augmentin -  Doxy -  Levofloxacin     Pertinent labs:    Recent Labs   Lab 22  0444 22  0741 22  0455   WBC 18.7* 31.4* 23.4*   HGB 8.9* 10.2* 11.2*   HCT 27.5* 31.4* 34.4*    414 418        Recent Labs   Lab 22  0444 22  0741 22  0455    142 141   CO2 30 28 28   BUN 20 20 20        Lab Results   Component Value Date    CRP 5.7 (H) 2022    CRP 7.0 (H) 2022    CRP 7.0 (H) 2022       Lab Results   Component Value Date    ALT 11 2022    AST 14 2022    ALKPHOS 54 2022         MICROBIOLOGY DATA:  c diff positive   Blood negative ,  sputum mixedflora on gram stain -- culture not done due to squamous epi cells    sputum too many squamous cells  AFB pending  Crypto negative  aspergillus pending    RADIOLOGY:  NM Lung Scan Perfusion Particulate    Result Date: 2022  EXAM: NM LUNG SCAN PERFUSION PARTICULATE LOCATION: [facility] DATE/TIME: 2022 5:18 PM INDICATION: PE suspected, high prob COMPARISON: Chest x-ray 2022, V/Q scan 2020, CT chest exam 2019 TECHNIQUE: 7.9 mCi technetium-99m MAA, IV. Standard lung perfusion imaging. FINDINGS: Heterogeneous perfusion throughout the left lung with numerous small subsegmental perfusion defects similar to the previous VQ scan. Perfusion to the right lung is more homogeneous,  with a few subtle areas of decreased activity also similar to the prior exam. Chest x-ray from today demonstrates emphysema with diffuse opacities in the left upper and mid left lung suggestive of pneumonia.     IMPRESSION: 1.  Chronic perfusion abnormalities throughout both lungs similar to the previous exam. No new perfusion abnormalities. Recent chest x-ray is suggestive of left mid and upper lung pneumonia.    Echocardiogram Complete    Result Date: 2022  [id number]^TEREZA^GERALD^[place] [address]  Name: JUDY ZARCO MRN: [id number] : 1945 Study Date: 2022 02:58 PM Age: 77 yrs Gender: Female Patient Location: [facility] Reason For Study: Syncope Ordering Physician: GERALD STARKS Performed By: SUGEY  BSA: 1.4 m2 Height: 59 in Weight: 112 lb ______________________________________________________________________________ Procedure Complete Portable Echo Adult. ______________________________________________________________________________ Interpretation Summary  1. The left ventricle is normal in size. Left ventricular systolic performance is moderately reduced. The ejection fraction is estimated to be 35-40%. 2. There is moderate global reduction in left ventricular systolic performance. 3. There is abnormal septal motion possibly related to altered electrical activation due to bundle branch block. 4. There is mild-moderate, to perhaps moderate, tricuspid insufficiency. 5. Normal right ventricular size with mildly reduced right ventricular systolic performance. 6. There is mild right atrial enlargement. 7. Right ventricular systolic pressure relative to right atrial pressure is mildly increased. The pulmonary artery pressure is estimated to be 45-50 mmHg plus right atrial pressure (the IVC is mildly dilated).  When compared to the prior real-time echocardiogram dated 2021, there has been a mild further diminution in left  ventricular systolic performance (it is this reader's impression that left ventricular systolic performance was mild-moderately reduced with an ejection fraction 40-45% on the prior examination). ______________________________________________________________________________ Left ventricle: The left ventricle is normal in size. Left ventricular systolic performance is moderately reduced. The ejection fraction is estimated to be 35-40%. There is moderate global reduction in left ventricular systolic performance. There is abnormal septal motion possibly related to altered electrical activation due to bundle branch block. Left ventricular wall thickness is normal. 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  Assessment of left atrial pressure (LAP): The cumulative findings are indeterminate in evaluating left atrial pressure.  Right ventricle: Normal right ventricular size with mildly reduced right ventricular systolic performance.  Left atrium: There is borderline left atrial enlargement.  Right atrium: There is mild right atrial enlargement.  IVC: The IVC is mildly dilated.  Aortic valve: The aortic valve is comprised of three cusps.  Mitral valve: There is mild mitral annular calcification. There is trace mitral insufficiency.  Tricuspid valve: The tricuspid valve is grossly morphologically normal. There is mild-moderate, to perhaps moderate, tricuspid insufficiency.  Pulmonic valve: The pulmonic valve is grossly morphologically normal.  Thoracic aorta: The aortic root and proximal ascending aorta are of normal dimension.  Pericardium: There is no significant pericardial effusion. ______________________________________________________________________________ ______________________________________________________________________________ MMode/2D Measurements & Calculations RVDd: 3.5 cm IVSd: 0.98 cm LVIDd: 4.1 cm LVIDs: 3.3 cm LVPWd: 0.90 cm FS: 19.4 % LV mass(C)d: 122.9 grams LV mass(C)dI:  85.3 grams/m2 Ao root diam: 2.8 cm LA dimension: 3.5 cm asc Aorta Diam: 2.8 cm LA/Ao: 1.2 LVOT diam: 1.8 cm LVOT area: 2.6 cm2  LA Volume Indexed (AL/bp): 28.8 ml/m2 RWT: 0.44  Time Measurements MM HR: 98.0 BPM  Doppler Measurements & Calculations MV E max carolyn: 89.4 cm/sec MV A max carolyn: 135.9 cm/sec MV E/A: 0.66 MV dec time: 0.12 sec LV V1 max PG: 3.2 mmHg LV V1 max: 89.2 cm/sec LV V1 VTI: 18.3 cm SV(LVOT): 48.2 ml SI(LVOT): 33.4 ml/m2 TR max carolyn: 348.4 cm/sec TR max P.6 mmHg E/E' av.9 Lateral E/e': 11.0 Medial E/e': 16.8  ______________________________________________________________________________ Report approved by: Deepak Anderson 2022 04:24 PM       US Lower Extremity Venous Duplex Bilateral    Result Date: 2022  EXAM: US LOWER EXTREMITY VENOUS DUPLEX BILATERAL LOCATION: [facility] DATE/TIME: 2022 3:49 PM INDICATION: hypoxia, hypotension; leg swelling COMPARISON: None. TECHNIQUE: Venous Duplex ultrasound of bilateral lower extremities with and without compression, augmentation and duplex. Color flow and spectral Doppler with waveform analysis performed. FINDINGS: Exam includes the common femoral, femoral, popliteal veins as well as segmentally visualized deep calf veins and greater saphenous vein. RIGHT: No deep vein thrombosis. No superficial thrombophlebitis. No popliteal cyst. LEFT: No deep vein thrombosis. No superficial thrombophlebitis. No popliteal cyst.     IMPRESSION: 1.  No deep venous thrombosis in the bilateral lower extremities.    XR Chest Port 1 View    Result Date: 2022  EXAM: XR CHEST PORT 1 VIEW LOCATION: [facility] DATE/TIME: 2022 10:22 AM INDICATION: Worsening shortness of breath, coarse breath sounds. COMPARISON: 04/10/2022. Others.     IMPRESSION: Persistent dense left upper lobe consolidation. The surrounding opacities are stable to perhaps marginally improved, though the dense consolidative  abnormality is without substantial change. Increased lucency superior to the consolidation could be from aerated lung or developing cavitation. Recommend continued follow-up. Small left pleural effusion. Right lung is grossly clear. Stable cardiomediastinal silhouette. Atherosclerosis. Right PICC tip near the distal SVC. NOTE: ABNORMAL REPORT THE DICTATION ABOVE DESCRIBES AN ABNORMALITY FOR WHICH FOLLOW-UP IS NEEDED.     XR Chest Port 1 View    Result Date: 4/10/2022  EXAM: XR CHEST PORTABLE 1 VIEW LOCATION: [facility] DATE/TIME: 04/10/2022, 7:56 AM INDICATION: Shortness of breath. COMPARISON: CT of the chest, abdomen, and pelvis performed 04/08/2022. Chest radiograph performed 04/07/2022.     IMPRESSION: Ill-defined consolidation and infiltrate in the left upper lobe is again noted. Mild hyperinflation both lungs. The right lung is otherwise clear. Tortuous calcified thoracic aorta. Heart size has increased in prominence compared to 04/07/2022. Pulmonary vascularity is within normal limits where seen. Old right rib fractures. Right PICC line has been removed.     XR Chest Port 1 View    Result Date: 4/7/2022  EXAM: XR CHEST PORT 1 VIEW LOCATION: [facility] DATE/TIME: 4/7/2022 4:49 PM INDICATION: Cough. COMPARISON: 06/02/2021.     IMPRESSION: New moderate opacity in the left mid to upper lung suggestive of pneumonia given history of cough. However, this requires follow-up to complete resolution to exclude an underlying lesion (6-8 week follow-up radiographs). Right PICC tip near the cavoatrial junction. Normal heart size. Atherosclerosis. No significant pleural effusion.     CT Chest Abdomen Pelvis w/o Contrast    Result Date: 4/8/2022  EXAM: CT CHEST ABDOMEN PELVIS W/O CONTRAST LOCATION: [facility] DATE/TIME: 4/8/2022 2:17 PM INDICATION: History of breast cancer. Pneumonia. Weight loss. COMPARISON: CT chest 6/12/2020, CT  chest/abdomen/pelvis 6/11/2019 TECHNIQUE: CT scan of the chest, abdomen, and pelvis was performed without IV contrast. Multiplanar reformats were obtained. Dose reduction techniques were used. CONTRAST: None. FINDINGS: LUNGS AND PLEURA: Moderate irregular consolidation in the posterior left upper lobe. Confluent emphysema. Mild bibasilar atelectasis/scar. Trace pleural effusions. Stable mild diffuse bronchial wall thickening. A few stable benign pulmonary nodules. MEDIASTINUM/AXILLAE: Right PICC. Mediastinal lymphadenopathy with the largest largest being a right paratracheal node measuring 1.6 cm in short axis, image 48:3. Small pericardial effusion. The main pulmonary artery is enlarged at 3.0 cm which may be seen with pulmonary artery hypertension. CORONARY ARTERY CALCIFICATION: Moderate. HEPATOBILIARY: Normal. PANCREAS: Normal. SPLEEN: Normal. ADRENAL GLANDS: Normal. KIDNEYS/BLADDER: Low-lying right kidney. Subcentimeter renal hypodensities which are too small to characterize. No hydronephrosis. BOWEL: No obstruction or inflammatory change. Normal appendix. LYMPH NODES: Normal. VASCULATURE: Stable ectasia of the abdominal aorta measuring 2.9 x 2.3 cm, image 169:3. Moderate atherosclerosis. PELVIC ORGANS: Normal. MUSCULOSKELETAL: Left hip arthroplasty. Osseous demineralization. Degenerative changes of the spine. Remote left rib fractures. T12 compression fracture with 80% loss of height which is likely remote.     IMPRESSION: 1.  Moderate left upper lobe consolidation. This is consistent with pneumonia. Recommend a follow-up chest radiograph in 4-6 weeks to document resolution. 2.  Mediastinal lymphadenopathy. 3.  No acute abnormality in the abdomen or pelvis.    CT Chest w/o Contrast    Result Date: 4/15/2022  EXAM: CT CHEST W/O CONTRAST LOCATION: [facility] DATE/TIME: 4/15/2022 1:21 PM INDICATION: Pneumonia, effusion or abscess suspected, xray done COMPARISON: CT 4/8/2022 TECHNIQUE: CT  chest without IV contrast. Multiplanar reformats were obtained. Dose reduction techniques were used. CONTRAST: None. FINDINGS: LUNGS AND PLEURA: There is now prominent cavity within the consolidated focus left upper lobe. Cavity measures approximately 5 cm with surrounding thick wall and lung consolidation surrounding fat. Mild increase in overall size of left upper lobe involvement. Tiny left pleural effusion now present. Minimal linear density at lung bases. Slight mucus plugging seen posterior right lower lobe. Prominent diffuse changes of emphysema. MEDIASTINUM/AXILLAE: Small reactive lymph nodes are stable. Heavy atherosclerotic calcifications of aorta. CORONARY ARTERY CALCIFICATION: Moderate. UPPER ABDOMEN: Diffuse atherosclerotic calcifications. MUSCULOSKELETAL: Scoliosis and severe compression of T12 unchanged     IMPRESSION: 1.  There is new cavity formation involving the left upper lobe pneumonia with a 5 cm cavity now present within the densely consolidated lung. 2.  Diffuse emphysema unchanged.

## 2022-04-19 NOTE — PROGRESS NOTES
PULMONARY MEDICINE PROGRESS NOTE  04/19/2022    Admit Date: 4/7/2022  CODE: No CPR- Do NOT Intubate    Reason for Consult: acute exacerbation of COPD, lung abscess     Assessment/Plan:     77 year old female with a history of severe asthma-COPD overlap syndrome with hyper-IgE followed at [facility], now in with severe left-sided CAP and AECOPD.  Patient had been started on levofloxacin for her presumed pneumonia.  However, a follow-up CT scan of the chest, her lung consolidation is progressed to a abscess, although malignancy remains on the differential given her significant weight loss prior to admission.  She is also on p.o. vancomycin for C. Difficile.    She strongly prefers to avoid invasive procedures including biopsies or bronchoscopy unless absolutely necessary.  I do not recommend either for this patient at this time.  She is clinically improving with decreasing white blood cell count.  I assume she needs AFB disease ruled out prior to going to rehab.  This should be done noninvasively with sputum collection.     Clinical course more consistent with necrotizing pneumonia rather than malignancy.    I agree with infectious disease regarding prolonged antibiotics.  Augmentin is a reasonable option as an outpatient.  Antibiotic course length per ID.  Continue supplemental oxygen  Repeat CT in 6 weeks  Complete steroid taper    Can restart home regimen on discharge.    Her follow-up will be with [place] [facility].  Please make sure the discharge summary gets sent to their clinic.    We will sign off from actively following.  Please contact our service with any questions.    Greater than 35 minutes spent in this visit.  Greater than 50% counseling and coordination of care.  This includes time spent at the bedside, discussion with nurse, discussion with primary team MD, review of data and documentation.                                                                                                                    "                                     SUBJECTIVE/INTERVAL HISTORY     Stephanie is a little frustrated today.  She asked for some nebulized medication and had to wait for period of time.  She is extremely nervous about her diagnosis.                                                                                                                                                    Exam/Data:     Vitals  /65 (BP Location: Left arm)   Pulse 68   Temp 97.7  F (36.5  C) (Oral)   Resp 18   Ht 1.499 m (4' 11\")   Wt 51.2 kg (112 lb 12.8 oz)   SpO2 94%   BMI 22.78 kg/m       I/O last 3 completed shifts:  In: 480 [P.O.:480]  Out: 1750 [Urine:1750]  Weight change:     EXAM:  /65 (BP Location: Left arm)   Pulse 68   Temp 97.7  F (36.5  C) (Oral)   Resp 18   Ht 1.499 m (4' 11\")   Wt 51.2 kg (112 lb 12.8 oz)   SpO2 94%   BMI 22.78 kg/m      Intake/Output last 3 shifts:  I/O last 3 completed shifts:  In: 480 [P.O.:480]  Out: 1750 [Urine:1750]  Intake/Output this shift:  I/O this shift:  In: -   Out: 200 [Urine:200]      Gen: alert, oriented  Neck supple, no palpable thyroid  CV: RRR, no m/g/r  Resp: faint crackles right > left, diminished  Abd: soft, nontender, BS+  Skin: no rashes or lesions  Ext: no edema   Neuro: PERRL, nonfocal exam  Psych: Tearful    ROS: A complete 10-system review of systems was obtained and is negative with the exception of what is noted in the history of present illness.    Medications:       - MEDICATION INSTRUCTIONS -         famotidine  20 mg Oral At Bedtime     fidaxomicin  200 mg Oral BID     fluticasone  1 spray Both Nostrils Daily     [Held by provider] fluticasone-vilanterol  1 puff Inhalation At Bedtime    And     [Held by provider] umeclidinium  1 puff Inhalation At Bedtime     furosemide  20 mg Oral Daily     gabapentin  200 mg Oral QAM     gabapentin  300 mg Oral At Bedtime     guaiFENesin  1,200 mg Oral BID     ipratropium - albuterol 0.5 mg/2.5 mg/3 mL  3 mL Nebulization " 4x daily     lactobacillus rhamnosus (GG)  1 capsule Oral BID     melatonin  5 mg Oral At Bedtime     meropenem  1 g Intravenous Q12H     metoprolol succinate ER  75 mg Oral At Bedtime     multivitamin RENAL  1 tablet Oral Daily     predniSONE  10 mg Oral Daily    Followed by     [START ON 4/22/2022] predniSONE  5 mg Oral Daily     rivaroxaban ANTICOAGULANT  15 mg Oral At Bedtime     simvastatin  40 mg Oral At Bedtime     sodium chloride  3 mL Nebulization 4x daily     tamoxifen  20 mg Oral QPM     traMADol  50 mg Oral BID     vancomycin  125 mg Oral 4x Daily         DATA  All laboratory and radiology has been personally reviewed by myself today.  Recent Labs   Lab 04/19/22  0444   WBC 18.7*   HGB 8.9*   HCT 27.5*        Recent Labs   Lab 04/19/22  0444 04/18/22  0741 04/17/22  0455    142 141   CO2 30 28 28   BUN 20 20 20   ALKPHOS 54  --   --    ALT 11  --   --    AST 14  --   --      CT chest reviewed interpreted by me: Consolidation changing to cavitary lesion.

## 2022-04-19 NOTE — PLAN OF CARE
Note from [id number]. Pt denied pain during shift thus far. Skilled monitoring in all medical conditions. No new skin issues noted, offloads self. Full sensation per pt. Assist of 1 for transferring. Saline locked between abx. PICC dressing changed. Pt using both the purewick and commode intermittently. Education on medication administration and use of call-light to reduce risk for falls and injury. Alarms in place. Airborne precautions. No further issues noted. VSS, 2L O2 utilized, dyspnea noted with exertion and at rest before nebs. Will continue to monitor.    Taylor R Schoenecker, RN

## 2022-04-20 ENCOUNTER — APPOINTMENT (OUTPATIENT)
Dept: PHYSICAL THERAPY | Facility: CLINIC | Age: 77
DRG: 177 | End: 2022-04-20
Payer: MEDICARE

## 2022-04-20 LAB
1,3 BETA GLUCAN SER-MCNC: <31 PG/ML
ANION GAP SERPL CALCULATED.3IONS-SCNC: 10 MMOL/L (ref 5–18)
ATRIAL RATE - MUSE: 79 BPM
BASOPHILS # BLD AUTO: 0 10E3/UL (ref 0–0.2)
BASOPHILS NFR BLD AUTO: 0 %
BUN SERPL-MCNC: 19 MG/DL (ref 8–28)
C REACTIVE PROTEIN LHE: 5.9 MG/DL (ref 0–0.8)
CALCIUM SERPL-MCNC: 8.4 MG/DL (ref 8.5–10.5)
CHLORIDE BLD-SCNC: 105 MMOL/L (ref 98–107)
CO2 SERPL-SCNC: 26 MMOL/L (ref 22–31)
CREAT SERPL-MCNC: 0.92 MG/DL (ref 0.6–1.1)
DIASTOLIC BLOOD PRESSURE - MUSE: NORMAL MMHG
EOSINOPHIL # BLD AUTO: 0.1 10E3/UL (ref 0–0.7)
EOSINOPHIL NFR BLD AUTO: 1 %
ERYTHROCYTE [DISTWIDTH] IN BLOOD BY AUTOMATED COUNT: 15 % (ref 10–15)
GALACTOMANNAN AG SERPL QL IA: NEGATIVE
GALACTOMANNAN AG SPEC IA-ACNC: 0.04
GFR SERPL CREATININE-BSD FRML MDRD: 64 ML/MIN/1.73M2
GLUCOSE BLD-MCNC: 80 MG/DL (ref 70–125)
H CAPSUL AG UR QL IA: NOT DETECTED
H CAPSUL AG UR-MCNC: NOT DETECTED NG/ML
HCT VFR BLD AUTO: 33 % (ref 35–47)
HGB BLD-MCNC: 10.4 G/DL (ref 11.7–15.7)
IMM GRANULOCYTES # BLD: 0.2 10E3/UL
IMM GRANULOCYTES NFR BLD: 1 %
INTERPRETATION ECG - MUSE: NORMAL
LYMPHOCYTES # BLD AUTO: 1.9 10E3/UL (ref 0.8–5.3)
LYMPHOCYTES NFR BLD AUTO: 9 %
MAGNESIUM SERPL-MCNC: 2.5 MG/DL (ref 1.8–2.6)
MCH RBC QN AUTO: 28.5 PG (ref 26.5–33)
MCHC RBC AUTO-ENTMCNC: 31.5 G/DL (ref 31.5–36.5)
MCV RBC AUTO: 90 FL (ref 78–100)
MONOCYTES # BLD AUTO: 2 10E3/UL (ref 0–1.3)
MONOCYTES NFR BLD AUTO: 9 %
NEUTROPHILS # BLD AUTO: 17.7 10E3/UL (ref 1.6–8.3)
NEUTROPHILS NFR BLD AUTO: 80 %
NRBC # BLD AUTO: 0 10E3/UL
NRBC BLD AUTO-RTO: 0 /100
OBSERVATION IMP: NEGATIVE
P AXIS - MUSE: 79 DEGREES
PHOSPHATE SERPL-MCNC: 3.2 MG/DL (ref 2.5–4.5)
PLATELET # BLD AUTO: 403 10E3/UL (ref 150–450)
POTASSIUM BLD-SCNC: 4.2 MMOL/L (ref 3.5–5)
PR INTERVAL - MUSE: 120 MS
QRS DURATION - MUSE: 114 MS
QT - MUSE: 426 MS
QTC - MUSE: 488 MS
R AXIS - MUSE: -33 DEGREES
RBC # BLD AUTO: 3.65 10E6/UL (ref 3.8–5.2)
SODIUM SERPL-SCNC: 141 MMOL/L (ref 136–145)
SYSTOLIC BLOOD PRESSURE - MUSE: NORMAL MMHG
T AXIS - MUSE: 98 DEGREES
VENTRICULAR RATE- MUSE: 79 BPM
WBC # BLD AUTO: 22.1 10E3/UL (ref 4–11)

## 2022-04-20 PROCEDURE — 250N000013 HC RX MED GY IP 250 OP 250 PS 637: Performed by: STUDENT IN AN ORGANIZED HEALTH CARE EDUCATION/TRAINING PROGRAM

## 2022-04-20 PROCEDURE — 93010 ELECTROCARDIOGRAM REPORT: CPT | Mod: HIP | Performed by: INTERNAL MEDICINE

## 2022-04-20 PROCEDURE — 97116 GAIT TRAINING THERAPY: CPT | Mod: GP

## 2022-04-20 PROCEDURE — 250N000013 HC RX MED GY IP 250 OP 250 PS 637

## 2022-04-20 PROCEDURE — 80048 BASIC METABOLIC PNL TOTAL CA: CPT | Performed by: STUDENT IN AN ORGANIZED HEALTH CARE EDUCATION/TRAINING PROGRAM

## 2022-04-20 PROCEDURE — 86140 C-REACTIVE PROTEIN: CPT | Performed by: STUDENT IN AN ORGANIZED HEALTH CARE EDUCATION/TRAINING PROGRAM

## 2022-04-20 PROCEDURE — 250N000011 HC RX IP 250 OP 636: Performed by: STUDENT IN AN ORGANIZED HEALTH CARE EDUCATION/TRAINING PROGRAM

## 2022-04-20 PROCEDURE — 250N000013 HC RX MED GY IP 250 OP 250 PS 637: Performed by: INTERNAL MEDICINE

## 2022-04-20 PROCEDURE — 93005 ELECTROCARDIOGRAM TRACING: CPT

## 2022-04-20 PROCEDURE — 99232 SBSQ HOSP IP/OBS MODERATE 35: CPT | Mod: GC

## 2022-04-20 PROCEDURE — 99233 SBSQ HOSP IP/OBS HIGH 50: CPT | Performed by: INTERNAL MEDICINE

## 2022-04-20 PROCEDURE — 250N000009 HC RX 250: Performed by: STUDENT IN AN ORGANIZED HEALTH CARE EDUCATION/TRAINING PROGRAM

## 2022-04-20 PROCEDURE — 84100 ASSAY OF PHOSPHORUS: CPT

## 2022-04-20 PROCEDURE — 94640 AIRWAY INHALATION TREATMENT: CPT

## 2022-04-20 PROCEDURE — 120N000001 HC R&B MED SURG/OB

## 2022-04-20 PROCEDURE — 999N000157 HC STATISTIC RCP TIME EA 10 MIN

## 2022-04-20 PROCEDURE — 250N000013 HC RX MED GY IP 250 OP 250 PS 637: Performed by: FAMILY MEDICINE

## 2022-04-20 PROCEDURE — 94640 AIRWAY INHALATION TREATMENT: CPT | Mod: 76

## 2022-04-20 PROCEDURE — 85025 COMPLETE CBC W/AUTO DIFF WBC: CPT | Performed by: STUDENT IN AN ORGANIZED HEALTH CARE EDUCATION/TRAINING PROGRAM

## 2022-04-20 PROCEDURE — 250N000012 HC RX MED GY IP 250 OP 636 PS 637

## 2022-04-20 PROCEDURE — 250N000009 HC RX 250: Performed by: FAMILY MEDICINE

## 2022-04-20 PROCEDURE — 83735 ASSAY OF MAGNESIUM: CPT

## 2022-04-20 RX ORDER — MIRTAZAPINE 15 MG/1
15 TABLET, FILM COATED ORAL AT BEDTIME
Status: DISCONTINUED | OUTPATIENT
Start: 2022-04-20 | End: 2022-04-22 | Stop reason: HOSPADM

## 2022-04-20 RX ADMIN — TAMOXIFEN CITRATE 20 MG: 10 TABLET ORAL at 20:52

## 2022-04-20 RX ADMIN — ALBUTEROL SULFATE 2.5 MG: 2.5 SOLUTION RESPIRATORY (INHALATION) at 05:13

## 2022-04-20 RX ADMIN — MEROPENEM 1 G: 1 INJECTION, POWDER, FOR SOLUTION INTRAVENOUS at 16:01

## 2022-04-20 RX ADMIN — TRAMADOL HYDROCHLORIDE 50 MG: 50 TABLET, COATED ORAL at 09:14

## 2022-04-20 RX ADMIN — PREDNISONE 10 MG: 5 TABLET ORAL at 09:14

## 2022-04-20 RX ADMIN — SODIUM CHLORIDE SOLN NEBU 3% 3 ML: 3 NEBU SOLN at 07:56

## 2022-04-20 RX ADMIN — METOPROLOL SUCCINATE 75 MG: 50 TABLET, EXTENDED RELEASE ORAL at 20:47

## 2022-04-20 RX ADMIN — IPRATROPIUM BROMIDE AND ALBUTEROL SULFATE 3 ML: 2.5; .5 SOLUTION RESPIRATORY (INHALATION) at 19:30

## 2022-04-20 RX ADMIN — VANCOMYCIN HYDROCHLORIDE 125 MG: 125 CAPSULE ORAL at 12:42

## 2022-04-20 RX ADMIN — SODIUM CHLORIDE SOLN NEBU 3% 3 ML: 3 NEBU SOLN at 16:16

## 2022-04-20 RX ADMIN — B-COMPLEX W/ C & FOLIC ACID TAB 1 MG 1 TABLET: 1 TAB at 09:14

## 2022-04-20 RX ADMIN — MEROPENEM 1 G: 1 INJECTION, POWDER, FOR SOLUTION INTRAVENOUS at 04:16

## 2022-04-20 RX ADMIN — SODIUM CHLORIDE SOLN NEBU 3% 3 ML: 3 NEBU SOLN at 12:23

## 2022-04-20 RX ADMIN — FUROSEMIDE 20 MG: 20 TABLET ORAL at 09:14

## 2022-04-20 RX ADMIN — SIMVASTATIN 40 MG: 40 TABLET, FILM COATED ORAL at 20:52

## 2022-04-20 RX ADMIN — IPRATROPIUM BROMIDE AND ALBUTEROL SULFATE 3 ML: 2.5; .5 SOLUTION RESPIRATORY (INHALATION) at 07:55

## 2022-04-20 RX ADMIN — VANCOMYCIN HYDROCHLORIDE 125 MG: 125 CAPSULE ORAL at 09:14

## 2022-04-20 RX ADMIN — GABAPENTIN 300 MG: 300 CAPSULE ORAL at 20:52

## 2022-04-20 RX ADMIN — Medication 1 CAPSULE: at 20:51

## 2022-04-20 RX ADMIN — TRAMADOL HYDROCHLORIDE 50 MG: 50 TABLET, COATED ORAL at 20:52

## 2022-04-20 RX ADMIN — IPRATROPIUM BROMIDE AND ALBUTEROL SULFATE 3 ML: 2.5; .5 SOLUTION RESPIRATORY (INHALATION) at 12:23

## 2022-04-20 RX ADMIN — ANORECTAL OINTMENT: 15.7; .44; 24; 20.6 OINTMENT TOPICAL at 18:52

## 2022-04-20 RX ADMIN — FIDAXOMICIN 200 MG: 200 TABLET, FILM COATED ORAL at 20:50

## 2022-04-20 RX ADMIN — VANCOMYCIN HYDROCHLORIDE 125 MG: 125 CAPSULE ORAL at 17:11

## 2022-04-20 RX ADMIN — GUAIFENESIN 1200 MG: 600 TABLET ORAL at 09:14

## 2022-04-20 RX ADMIN — VANCOMYCIN HYDROCHLORIDE 125 MG: 125 CAPSULE ORAL at 20:51

## 2022-04-20 RX ADMIN — Medication 1 CAPSULE: at 09:14

## 2022-04-20 RX ADMIN — IPRATROPIUM BROMIDE AND ALBUTEROL SULFATE 3 ML: 2.5; .5 SOLUTION RESPIRATORY (INHALATION) at 16:16

## 2022-04-20 RX ADMIN — SODIUM CHLORIDE SOLN NEBU 3% 3 ML: 3 NEBU SOLN at 19:30

## 2022-04-20 RX ADMIN — ANORECTAL OINTMENT: 15.7; .44; 24; 20.6 OINTMENT TOPICAL at 12:42

## 2022-04-20 RX ADMIN — GUAIFENESIN 1200 MG: 600 TABLET ORAL at 20:52

## 2022-04-20 RX ADMIN — FIDAXOMICIN 200 MG: 200 TABLET, FILM COATED ORAL at 09:14

## 2022-04-20 RX ADMIN — FAMOTIDINE 20 MG: 20 TABLET ORAL at 20:52

## 2022-04-20 RX ADMIN — RIVAROXABAN 15 MG: 15 TABLET, FILM COATED ORAL at 20:52

## 2022-04-20 RX ADMIN — MIRTAZAPINE 15 MG: 15 TABLET, FILM COATED ORAL at 20:52

## 2022-04-20 RX ADMIN — GABAPENTIN 200 MG: 100 CAPSULE ORAL at 06:26

## 2022-04-20 RX ADMIN — Medication 5 MG: at 20:51

## 2022-04-20 ASSESSMENT — ACTIVITIES OF DAILY LIVING (ADL)
ADLS_ACUITY_SCORE: 9
ADLS_ACUITY_SCORE: 11
ADLS_ACUITY_SCORE: 9
ADLS_ACUITY_SCORE: 11
ADLS_ACUITY_SCORE: 9
ADLS_ACUITY_SCORE: 9
ADLS_ACUITY_SCORE: 11
ADLS_ACUITY_SCORE: 9
ADLS_ACUITY_SCORE: 11
ADLS_ACUITY_SCORE: 9
ADLS_ACUITY_SCORE: 11
ADLS_ACUITY_SCORE: 9

## 2022-04-20 NOTE — PLAN OF CARE
Goal Outcome Evaluation:    Dyspnea with repositioning and ambulating to commode. Denies pain.  On 2L O2. Alert and oriented. Bed alarm on for safety.  Airborne and enteric precautions in place.

## 2022-04-20 NOTE — PROGRESS NOTES
Pt stable on 2-3L NC with O2 sats 91-94%. Pt has no shortness of breath at rest. Lungs are diminished and cough is congested. Pt feels that the nebs are helping her with her breathing. RT encouraged pt to do aerobika in between nebulizer treatments.  RT to follow.     Lori Cerna, RT

## 2022-04-20 NOTE — PLAN OF CARE
Goal Outcome Evaluation:    Plan of Care Reviewed With: patient     Overall Patient Progress: improving    Outcome Evaluation: Pt still with SOB with exersion, takes time to recover and O2 increased for pt comfort. She continues to be 2L at rest. Requested PRN neb this morning. Discharge plan is TCU once medically stable.

## 2022-04-20 NOTE — PLAN OF CARE
Note from [id number]. Pt denied pain during shift thus far. Skilled monitoring in all medical conditions. No new skin issues noted, offloads self. Pt encouraged many times today to sit in her chair with her buttocks cushion and reposition frequently d/t sore buttocks, compliant. Buttocks has blanchable redness, cream applied. Full sensation per pt. Assist of 1 for transferring. Saline locked between abx. Pt using both the purewick and commode intermittently. Education on medication administration and use of call-light to reduce risk for falls and injury. Alarms in place. No further issues noted. VSS, 2L O2 utilized, dyspnea noted with exertion and at rest before nebs. Will continue to monitor.    Taylor R Schoenecker, RN

## 2022-04-20 NOTE — PROGRESS NOTES
INFECTIOUS DISEASE FOLLOW UP NOTE  04/20/2022      ASSESSMENT:    1. Large Cavitary pneumonia. Abscess. Cavitary Mass? Active issue- improving  2. Rapid progression into cavitation in hospital while on Zosyn. Radiographic changes may not necessarily equate to antibiotic failure -- likely strep, anaerobes, sometimes gram negatives.   3. With elevated procalcitonin and significant leukocytosis and poor dentition this suggests polymicrobial bacterial infection  4. However what is concerning is progressive decline over many months and 40 pound weight loss which could signal either malignancy or chronic infection such as mycobacterial or fungal, with mediastinal lymphadenopathy visualized on CT chest.           AFB Smear negative x 3          PLAN:      1. On meropenem, expect IV course-4-6 weeks per Dr. Travis,  with imaging follow up. Unless micro directs us otherwise.   2. AFB smear negative x 3, cultures in process. Discontinued Airborne Isolation  3. Oral vancomycin for c diff. Added Fidaxomicin as worsening WBC  4. Monitor CBC, CMP  5. Pulmonary consult reviewed.  No plans for bronchoscopy at this time.    6. Fungal assays  7. Patient new to me on 4/18/2022. Please see previous notes by Emir Travis MD  8. Updated nursing staff outside patient's room        Lady Latif MD  [facility]  [phone]        ______________________________________________________________________    SUBJECTIVE / INTERVAL HISTORY:     Doing better  Updated patient about the AFB smear results    Chart reviewed: airborne isolation discontinued as negative AFB Smear x 3    Previous note:  Doing slightly better.   Discussed labs, count improving    Previous note: coughs. Temps ok. Stools forming up.   No known TB exposures. Previous travel to [place] years ago.       ROS: deconditioned. All other systems negative except as listed above.        OBJECTIVE:  /64 (BP Location: Left arm, Patient  "Position: Semi-Scanlon's)   Pulse 84   Temp 97.4  F (36.3  C) (Oral)   Resp 22   Ht 1.499 m (4' 11\")   Wt 51.1 kg (112 lb 9.6 oz)   SpO2 93%   BMI 22.74 kg/m         Vital Signs  Temp: 97.5  F (36.4  C)  Temp src: Oral  Resp: 18  Pulse: 75  Pulse Rate Source: Monitor  BP: 134/60  BP Location: Left arm    Temp (24hrs), Av.5  F (36.4  C), Min:97.2  F (36.2  C), Max:98.1  F (36.7  C)    GEN: Appears fatigued, thin,  NC O2.   RESPIRATORY:  Normal breathing pattern. Decreased left side  CARDIOVASCULAR:  Regular rate and rhythm. Normal S1 and S2.   ABDOMEN:  Previous note: Soft, normal bowel sounds, non-tender, no masses.  EXTREMITIES: No edema.  SKIN/HAIR/NAILS:  No rashes. Ecchymosis.   IV: peripheral        Antibiotics:  Meropenem 4/15-  Vancomycin PO -  Fidaxomicin      previous  Pip/tazo -  Augmentin -  Doxy -  Levofloxacin     Pertinent labs:    Recent Labs   Lab 22  0620 22  0444 22  0741   WBC 22.1* 18.7* 31.4*   HGB 10.4* 8.9* 10.2*   HCT 33.0* 27.5* 31.4*    342 414        Recent Labs   Lab 22  0620 22  0444 22  0741    142 142   CO2 26 30 28   BUN 19 20 20        Lab Results   Component Value Date    CRP 5.9 (H) 2022    CRP 5.7 (H) 2022    CRP 7.0 (H) 2022       Lab Results   Component Value Date    ALT 11 2022    AST 14 2022    ALKPHOS 54 2022         MICROBIOLOGY DATA:  c diff positive   Blood negative ,  sputum mixedflora on gram stain -- culture not done due to squamous epi cells    sputum too many squamous cells  AFB pending  Crypto negative  aspergillus pending    RADIOLOGY:  NM Lung Scan Perfusion Particulate    Result Date: 2022  EXAM: NM LUNG SCAN PERFUSION PARTICULATE LOCATION: [facility] DATE/TIME: 2022 5:18 PM INDICATION: PE suspected, high prob COMPARISON: Chest x-ray 2022, V/Q scan 2020, CT chest exam 2019 " TECHNIQUE: 7.9 mCi technetium-99m MAA, IV. Standard lung perfusion imaging. FINDINGS: Heterogeneous perfusion throughout the left lung with numerous small subsegmental perfusion defects similar to the previous VQ scan. Perfusion to the right lung is more homogeneous, with a few subtle areas of decreased activity also similar to the prior exam. Chest x-ray from today demonstrates emphysema with diffuse opacities in the left upper and mid left lung suggestive of pneumonia.     IMPRESSION: 1.  Chronic perfusion abnormalities throughout both lungs similar to the previous exam. No new perfusion abnormalities. Recent chest x-ray is suggestive of left mid and upper lung pneumonia.    Echocardiogram Complete    Result Date: 2022  [id number]^TEREZA^GERALD^[place] [address]  Name: JUDY ZARCO MRN: [id number] : 1945 Study Date: 2022 02:58 PM Age: 77 yrs Gender: Female Patient Location: [facility] Reason For Study: Syncope Ordering Physician: GERALD STARKS Performed By: SUGEY  BSA: 1.4 m2 Height: 59 in Weight: 112 lb ______________________________________________________________________________ Procedure Complete Portable Echo Adult. ______________________________________________________________________________ Interpretation Summary  1. The left ventricle is normal in size. Left ventricular systolic performance is moderately reduced. The ejection fraction is estimated to be 35-40%. 2. There is moderate global reduction in left ventricular systolic performance. 3. There is abnormal septal motion possibly related to altered electrical activation due to bundle branch block. 4. There is mild-moderate, to perhaps moderate, tricuspid insufficiency. 5. Normal right ventricular size with mildly reduced right ventricular systolic performance. 6. There is mild right atrial enlargement. 7. Right ventricular systolic pressure relative to right atrial  pressure is mildly increased. The pulmonary artery pressure is estimated to be 45-50 mmHg plus right atrial pressure (the IVC is mildly dilated).  When compared to the prior real-time echocardiogram dated 28 July 2021, there has been a mild further diminution in left ventricular systolic performance (it is this reader's impression that left ventricular systolic performance was mild-moderately reduced with an ejection fraction 40-45% on the prior examination). ______________________________________________________________________________ Left ventricle: The left ventricle is normal in size. Left ventricular systolic performance is moderately reduced. The ejection fraction is estimated to be 35-40%. There is moderate global reduction in left ventricular systolic performance. There is abnormal septal motion possibly related to altered electrical activation due to bundle branch block. Left ventricular wall thickness is normal. 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  Assessment of left atrial pressure (LAP): The cumulative findings are indeterminate in evaluating left atrial pressure.  Right ventricle: Normal right ventricular size with mildly reduced right ventricular systolic performance.  Left atrium: There is borderline left atrial enlargement.  Right atrium: There is mild right atrial enlargement.  IVC: The IVC is mildly dilated.  Aortic valve: The aortic valve is comprised of three cusps.  Mitral valve: There is mild mitral annular calcification. There is trace mitral insufficiency.  Tricuspid valve: The tricuspid valve is grossly morphologically normal. There is mild-moderate, to perhaps moderate, tricuspid insufficiency.  Pulmonic valve: The pulmonic valve is grossly morphologically normal.  Thoracic aorta: The aortic root and proximal ascending aorta are of normal dimension.  Pericardium: There is no significant pericardial effusion.  ______________________________________________________________________________ ______________________________________________________________________________ MMode/2D Measurements & Calculations RVDd: 3.5 cm IVSd: 0.98 cm LVIDd: 4.1 cm LVIDs: 3.3 cm LVPWd: 0.90 cm FS: 19.4 % LV mass(C)d: 122.9 grams LV mass(C)dI: 85.3 grams/m2 Ao root diam: 2.8 cm LA dimension: 3.5 cm asc Aorta Diam: 2.8 cm LA/Ao: 1.2 LVOT diam: 1.8 cm LVOT area: 2.6 cm2  LA Volume Indexed (AL/bp): 28.8 ml/m2 RWT: 0.44  Time Measurements MM HR: 98.0 BPM  Doppler Measurements & Calculations MV E max carolyn: 89.4 cm/sec MV A max carolyn: 135.9 cm/sec MV E/A: 0.66 MV dec time: 0.12 sec LV V1 max PG: 3.2 mmHg LV V1 max: 89.2 cm/sec LV V1 VTI: 18.3 cm SV(LVOT): 48.2 ml SI(LVOT): 33.4 ml/m2 TR max carolyn: 348.4 cm/sec TR max P.6 mmHg E/E' av.9 Lateral E/e': 11.0 Medial E/e': 16.8  ______________________________________________________________________________ Report approved by: Deepak Anderson 2022 04:24 PM       US Lower Extremity Venous Duplex Bilateral    Result Date: 2022  EXAM: US LOWER EXTREMITY VENOUS DUPLEX BILATERAL LOCATION: [facility] DATE/TIME: 2022 3:49 PM INDICATION: hypoxia, hypotension; leg swelling COMPARISON: None. TECHNIQUE: Venous Duplex ultrasound of bilateral lower extremities with and without compression, augmentation and duplex. Color flow and spectral Doppler with waveform analysis performed. FINDINGS: Exam includes the common femoral, femoral, popliteal veins as well as segmentally visualized deep calf veins and greater saphenous vein. RIGHT: No deep vein thrombosis. No superficial thrombophlebitis. No popliteal cyst. LEFT: No deep vein thrombosis. No superficial thrombophlebitis. No popliteal cyst.     IMPRESSION: 1.  No deep venous thrombosis in the bilateral lower extremities.    XR Chest Port 1 View    Result Date: 2022  EXAM: XR CHEST PORT 1 VIEW LOCATION: [facility] DATE/TIME: 4/14/2022 10:22 AM INDICATION: Worsening shortness of breath, coarse breath sounds. COMPARISON: 04/10/2022. Others.     IMPRESSION: Persistent dense left upper lobe consolidation. The surrounding opacities are stable to perhaps marginally improved, though the dense consolidative abnormality is without substantial change. Increased lucency superior to the consolidation could be from aerated lung or developing cavitation. Recommend continued follow-up. Small left pleural effusion. Right lung is grossly clear. Stable cardiomediastinal silhouette. Atherosclerosis. Right PICC tip near the distal SVC. NOTE: ABNORMAL REPORT THE DICTATION ABOVE DESCRIBES AN ABNORMALITY FOR WHICH FOLLOW-UP IS NEEDED.     XR Chest Port 1 View    Result Date: 4/10/2022  EXAM: XR CHEST PORTABLE 1 VIEW LOCATION: [facility] DATE/TIME: 04/10/2022, 7:56 AM INDICATION: Shortness of breath. COMPARISON: CT of the chest, abdomen, and pelvis performed 04/08/2022. Chest radiograph performed 04/07/2022.     IMPRESSION: Ill-defined consolidation and infiltrate in the left upper lobe is again noted. Mild hyperinflation both lungs. The right lung is otherwise clear. Tortuous calcified thoracic aorta. Heart size has increased in prominence compared to 04/07/2022. Pulmonary vascularity is within normal limits where seen. Old right rib fractures. Right PICC line has been removed.     XR Chest Port 1 View    Result Date: 4/7/2022  EXAM: XR CHEST PORT 1 VIEW LOCATION: [facility] DATE/TIME: 4/7/2022 4:49 PM INDICATION: Cough. COMPARISON: 06/02/2021.     IMPRESSION: New moderate opacity in the left mid to upper lung suggestive of pneumonia given history of cough. However, this requires follow-up to complete resolution to exclude an underlying lesion (6-8 week follow-up radiographs). Right PICC tip near the cavoatrial junction. Normal heart size. Atherosclerosis. No significant pleural  effusion.     CT Chest Abdomen Pelvis w/o Contrast    Result Date: 4/8/2022  EXAM: CT CHEST ABDOMEN PELVIS W/O CONTRAST LOCATION: [facility] DATE/TIME: 4/8/2022 2:17 PM INDICATION: History of breast cancer. Pneumonia. Weight loss. COMPARISON: CT chest 6/12/2020, CT chest/abdomen/pelvis 6/11/2019 TECHNIQUE: CT scan of the chest, abdomen, and pelvis was performed without IV contrast. Multiplanar reformats were obtained. Dose reduction techniques were used. CONTRAST: None. FINDINGS: LUNGS AND PLEURA: Moderate irregular consolidation in the posterior left upper lobe. Confluent emphysema. Mild bibasilar atelectasis/scar. Trace pleural effusions. Stable mild diffuse bronchial wall thickening. A few stable benign pulmonary nodules. MEDIASTINUM/AXILLAE: Right PICC. Mediastinal lymphadenopathy with the largest largest being a right paratracheal node measuring 1.6 cm in short axis, image 48:3. Small pericardial effusion. The main pulmonary artery is enlarged at 3.0 cm which may be seen with pulmonary artery hypertension. CORONARY ARTERY CALCIFICATION: Moderate. HEPATOBILIARY: Normal. PANCREAS: Normal. SPLEEN: Normal. ADRENAL GLANDS: Normal. KIDNEYS/BLADDER: Low-lying right kidney. Subcentimeter renal hypodensities which are too small to characterize. No hydronephrosis. BOWEL: No obstruction or inflammatory change. Normal appendix. LYMPH NODES: Normal. VASCULATURE: Stable ectasia of the abdominal aorta measuring 2.9 x 2.3 cm, image 169:3. Moderate atherosclerosis. PELVIC ORGANS: Normal. MUSCULOSKELETAL: Left hip arthroplasty. Osseous demineralization. Degenerative changes of the spine. Remote left rib fractures. T12 compression fracture with 80% loss of height which is likely remote.     IMPRESSION: 1.  Moderate left upper lobe consolidation. This is consistent with pneumonia. Recommend a follow-up chest radiograph in 4-6 weeks to document resolution. 2.  Mediastinal lymphadenopathy. 3.  No acute  abnormality in the abdomen or pelvis.    CT Chest w/o Contrast    Result Date: 4/15/2022  EXAM: CT CHEST W/O CONTRAST LOCATION: [facility] DATE/TIME: 4/15/2022 1:21 PM INDICATION: Pneumonia, effusion or abscess suspected, xray done COMPARISON: CT 4/8/2022 TECHNIQUE: CT chest without IV contrast. Multiplanar reformats were obtained. Dose reduction techniques were used. CONTRAST: None. FINDINGS: LUNGS AND PLEURA: There is now prominent cavity within the consolidated focus left upper lobe. Cavity measures approximately 5 cm with surrounding thick wall and lung consolidation surrounding fat. Mild increase in overall size of left upper lobe involvement. Tiny left pleural effusion now present. Minimal linear density at lung bases. Slight mucus plugging seen posterior right lower lobe. Prominent diffuse changes of emphysema. MEDIASTINUM/AXILLAE: Small reactive lymph nodes are stable. Heavy atherosclerotic calcifications of aorta. CORONARY ARTERY CALCIFICATION: Moderate. UPPER ABDOMEN: Diffuse atherosclerotic calcifications. MUSCULOSKELETAL: Scoliosis and severe compression of T12 unchanged     IMPRESSION: 1.  There is new cavity formation involving the left upper lobe pneumonia with a 5 cm cavity now present within the densely consolidated lung. 2.  Diffuse emphysema unchanged.        Total Time Spent 35 minutes with >50% of the time spent in evaluation,chart review.

## 2022-04-20 NOTE — PROGRESS NOTES
PULMONARY / CRITICAL CARE PROGRESS NOTE    Date / Time of Admission:  4/7/2022 12:45 PM    Assessment:     Irene León is a 77 year old female with history of severe asthma- COPD overlap syndrome on O2 supplementation, HTN, non ischemic cardiomyopathy, factor V Leiden mutation on xarelto, osteoporosis, breast cancer s/p left lumpectomy.  Presents to ED for evaluation of SOB. Diagnosed with dense left upper lobe pneumonia.   Treated with broad Abx. Clinically better. Follow up chest CT scan showed dense ZONIA infiltrate and large cavitary lesion. ID service broaded ABx. AFB in sputum negative. Started on oral vancomycin for C diff infection. Patient has declined diagnostic bronchoscopy in the past.     1. Acute on chronic respiratory failure  2. Necrotizing pneumonia   Raising WBC. Follow up chest CT scan showed ZONIA dense infiltrate and new 5 cm cavitary lesion.   AFB in sputum negative. Previous sputum Cx was a contaminated sample, too many epithelial cells. Serum Aspergillus galactomannan Ag was negative.   Abx per ID recommendation.   Diagnostic bronchoscopy was discussed with patient, she declines it. Pros and cons were discussed. Will re-discus procedure in AM.   3. Asthma-COPD exacerbation   Start to taper down systemic steroids   4. C diff infection   5. HTN, non ischemic cardiomyopathy   6. CKD stage IV  7. Hx breast cancer s/p lumpectomy   8. Osteoporosis     Advance Directives: DNR    Plan:   1. Titrate Fio2 to keep SpO2 > 90%  2. Schedule bronchodilators  3. Taper systemic steroids  4. Abx per ID recommendation, currently on meropenem, fidaxomicin and oral vancomycin   5. Will re-discus diagnostic bronchoscopy in AM  6. H2 blocker for GI prophylaxis   7. DVT prophylaxis anticoagulated with xarelto  8. PT, OT evaluation.     Please contact me if you have any questions.    Marcelo Melgoza  Pulmonary / Critical Care  04/20/2022  11:36 AM      Subjective:   HPI:  Irene León is a 77 year old  female with history of severe asthma- COPD overlap syndrome on O2 supplementation, elevated IgE level, HTN, non ischemic cardiomyopathy, factor V Leiden mutation on xarelto, osteoporosis, breast cancer s/p left lumpectomy.  Presents to ED for evaluation of SOB. Diagnosed with dense left upper lobe pneumonia.   Treated with broad Abx. Clinically better. Follow up chest CT scan showed dense ZONIA infiltrate and 5 cm cavitary lesion. ID service broaded ABx. AFB in sputum negative. Patient reported diarrhea, started on oral vancomycin for C diff infection. Patient has declined diagnostic bronchoscopy.     Events overnight  - Reports doing well, decrease shortness of breath  - Diagnostic bronchoscopy was again discussed with patient, pros and cons were discussed, patient is concerned about risk of procedure, will re-discuss procedure in AM.     Allergies: Sulfa (sulfonamide antibiotics) [sulfa drugs], Homeopathic drugs [external allergen needs reconciliation - see comment], Mold extracts [molds & smuts], Morphine (pf) [morphine], Lisinopril, and Sulfacetamide sodium [sulfacetamide]     MEDS:  Current Facility-Administered Medications   Medication     acetaminophen (TYLENOL) tablet 1,000 mg     albuterol (PROVENTIL) neb solution 2.5 mg     benzonatate (TESSALON) capsule 100 mg     benztropine (COGENTIN) tablet 1 mg     famotidine (PEPCID) tablet 20 mg     fidaxomicin (DIFICID) tablet 200 mg     fluticasone (FLONASE) 50 MCG/ACT spray 1 spray     [Held by provider] fluticasone-vilanterol (BREO ELLIPTA) 200-25 MCG/INH inhaler 1 puff    And     [Held by provider] umeclidinium (INCRUSE ELLIPTA) 62.5 MCG/INH inhaler 1 puff     furosemide (LASIX) tablet 20 mg     gabapentin (NEURONTIN) capsule 200 mg     gabapentin (NEURONTIN) capsule 300 mg     guaiFENesin (MUCINEX) 12 hr tablet 1,200 mg     guaiFENesin (ROBITUSSIN) 20 mg/mL solution 10 mL     hydrALAZINE (APRESOLINE) injection 5 mg     ipratropium - albuterol 0.5 mg/2.5 mg/3 mL  "(DUONEB) neb solution 3 mL     lactobacillus rhamnosus (GG) (CULTURELL) capsule 1 capsule     melatonin tablet 5 mg     menthol-zinc oxide (CALMOSEPTINE) 0.44-20.6 % ointment OINT     meropenem (MERREM) 1 g vial to attach to  mL bag     metoprolol succinate ER (TOPROL-XL) 24 hr tablet 75 mg     multivitamin RENAL (RENAVITE RX/NEPHROVITE) tablet 1 tablet     naloxone (NARCAN) injection 0.2 mg    Or     naloxone (NARCAN) injection 0.4 mg    Or     naloxone (NARCAN) injection 0.2 mg    Or     naloxone (NARCAN) injection 0.4 mg     Patient is already receiving anticoagulation with heparin, enoxaparin (LOVENOX), warfarin (COUMADIN)  or other anticoagulant medication     predniSONE (DELTASONE) tablet 10 mg    Followed by     [START ON 4/22/2022] predniSONE (DELTASONE) tablet 5 mg     rivaroxaban ANTICOAGULANT (XARELTO) tablet 15 mg     simvastatin (ZOCOR) tablet 40 mg     sodium chloride (NEBUSAL) 3 % neb solution 3 mL     tamoxifen (NOLVADEX) tablet 20 mg     traMADol (ULTRAM) tablet 50 mg     vancomycin (VANCOCIN) capsule 125 mg       Objective:   VITALS:  BP (!) 143/61 (BP Location: Right leg)   Pulse 83   Temp 97.6  F (36.4  C) (Oral)   Resp 20   Ht 1.499 m (4' 11\")   Wt 51.1 kg (112 lb 9.6 oz)   SpO2 92%   BMI 22.74 kg/m    VENT:  Resp: 20    EXAM:   Gen: awake, alert, no distress  HEENT: pink conjunctiva, moist mucosa, Mallampati II/IV  Neck: no thyromegaly, masses or JVD  Lungs: decrease breath sounds both HT, discrete crackles left upper hemithorax  CV: regular, no murmurs or gallops appreciated  Abdomen: soft, NT, BS wnl  Ext: no edema  Neuro: CN II-XII intact, non focal       Data Review:  Recent Labs   Lab 04/20/22  0620 04/19/22  0444 04/18/22  0741 04/17/22  0455 04/16/22  0636 04/15/22  1028   GLC 80 86 83 71 73 104      04/20/22 06:20   Sodium 141   Potassium 4.2   Chloride 105   Carbon Dioxide 26   Urea Nitrogen 19   Creatinine 0.92   GFR Estimate 64 [1]   Calcium 8.4 (L)   Anion Gap 10 "   Magnesium 2.5   Phosphorus 3.2      04/20/22 06:20   WBC 22.1 (H)   Hemoglobin 10.4 (L)   Hematocrit 33.0 (L)   Platelet Count 403   RBC Count 3.65 (L)   MCV 90   MCH 28.5   MCHC 31.5   RDW 15.0   % Neutrophils 80   % Lymphocytes 9   % Monocytes 9   % Eosinophils 1   % Basophils 0     CT CHEST W/O CONTRAST  LOCATION: [facility]  DATE/TIME: 4/15/2022 1:21 PM  INDICATION: Pneumonia, effusion or abscess suspected, xray done  COMPARISON: CT 4/8/2022  FINDINGS:   LUNGS AND PLEURA: There is now prominent cavity within the consolidated focus left upper lobe. Cavity measures approximately 5 cm with surrounding thick wall and lung consolidation surrounding fat. Mild increase in overall size of left upper lobe involvement.  Tiny left pleural effusion now present. Minimal linear density at lung bases. Slight mucus plugging seen posterior right lower lobe. Prominent diffuse changes of emphysema.  MEDIASTINUM/AXILLAE: Small reactive lymph nodes are stable. Heavy atherosclerotic calcifications of aorta.  CORONARY ARTERY CALCIFICATION: Moderate.  UPPER ABDOMEN: Diffuse atherosclerotic calcifications.  MUSCULOSKELETAL: Scoliosis and severe compression of T12 unchanged  IMPRESSION:   1.  There is new cavity formation involving the left upper lobe pneumonia with a 5 cm cavity now present within the densely consolidated lung.  2.  Diffuse emphysema unchanged.    By:  Marcelo Melgoza MD, 04/20/2022  11:36 AM    Primary Care Physician:  Pankaj Montanez

## 2022-04-20 NOTE — PROGRESS NOTES
"Visited pt due to LOS and follow up from last week's visit.    Pt is Religion. Mare is essential to her ability to remain hopeful. When asked if she would like a  to anoint her, she responded definitely not. \"I think that would freak me out. I'm not ready to die.\" We discussed the sacrament's re-interpretation to healing not dying. Still, not interested.    Stephanie prays often. She is comfortable with her conversations with God. They bring her strength and comfort.    Stephanie has noticeably more energy than early last week. She reports that she is doing better. She is looking forward to getting on antibiotics and moving forward. She wants to live as long as she can.    I shared a blessing and Stephanie expressed gratitude for the visit.    Spiritual Care is available as needed or requested.    VERONICA Salazar Div.    "

## 2022-04-20 NOTE — PROGRESS NOTES
[facility]    Progress Note - Hospitalist Service       Date of Admission:  4/7/2022    Assessment & Plan    Irene León is a 77 year old female admitted on 4/7/2022 for community acquired pneumonia in the setting of severe COPD on baseline 2L O2 by NC. PMHx also includes HFrEF, LBBB, HTN, factor V Leiden and atrial fibrillation on Xarelto, CKD stage 3, and 35+ lb unintentional weight loss in the past one year. Hospitalization was initially complicated by resolved delirium, followed by frequent stooling and diagnosis of C difficile diarrhea. After completing a course of antibiotics for pneumonia, she experienced acute worsening of respiratory status and repeat CT chest revealed interim development of a 5cm left upper lobe cavitation. Infectious disease and pulmonology were consulted.  Antibiotics were broadened to zosyn then transitioned to meropenem. Discharge plan pending continued improvement and final ID/Pulm recommendations.    Acute Hypoxic Respiratory Failure, improved  Community-acquired left mid and upper lobe pneumonia  Left cavitary lesion on chest CT  COPD Exacerbation  Elevated lactate, improved  Leukocytosis, worsening  Admitted with left upper and mid lobe pneumonia with COPD exacerbation. Has had frequent outpatient antibiotics and steroid tapers. Was recommended to use oxygen 2.5 L at home, but only uses occasional. OP pulmonologist at [facility]. CT Chest 4/8 showed moderate upper lobe consolidation. CXR 4/10 showed continued pneumonia, and WBC, CRP consistently elevated. C-diff+ 4/12. 4/14 CXR showed no improvement. 4/15 Chest CT showing: new cavity formation involving the left upper lobe pneumonia with a 5 cm cavity now present within the densely consolidated lung. ID consulted, pulmonary re-consulted. Concern for abscess, bacterial infection most likely as her WBC is improving with antibiotics. Fungal, TB cultures pending. Will need negative AFB x 3 prior to  discharge.   - Pulmonology consulted, appreciate recs, signed off 4/19   - Bronchoscopy not recommended at this time due to high risk  - Consulted ID for new cavitation, appreciate recs   - IV Meropenem    - TB, Fungal, workup, broad differential   - Sputum culture pending   - Fungal, atypical cultures still in process    - AFB negative x 2 at this point    - Airborne precautions   - Immediate family allowed to visit. Discussed with Nursing Supervisor, Dr. Travis.  - Prednisone taper 30 mg, 20 mg, 10 mg, 5 mg for 3 days each  - Scheduled Duonebs Q4hrs with Mucomyst  - PRN Albuterol Nebs Q2Hrs  - Hold Trelegy Ellipta    - BMP, CBC, CRP daily  - Repeat imaging 4-6 weeks at discharge    Hospitalization Anti-Infective's  Ceftriaxone 1 G 4/7  Doxycyline IV 4/7-4/12->PO Doxycycline 4/13  Zosyn IV 4/7-4/12->Augmentin 4/12-4/13  Levaquin 750 mg PO 4/14, then discontinued  Zosyn 4/15, then discontinued  Meropenem 1 G 4/15->  PO Vanco C-diff 4/12->  Fidaxomicin PO C-diff 4/18->    C-diff Positive  Diarrhea, improved  Patient began having loose stools during admission. C-diff+ 4/11/22.   - Vanco  mg QID x 10 days (4/12-4/21)  - Fidaxomicin PO x 10 days (4/18-)  - Continue probiotic BID    Anxiety  Panic attack  Has had frequent episodes of anxiety overnight. Felt like she had a panic attack and claustrophobia overnight. Was given Ativan 0.5 mg x 1 and was able to sleep, found it helpful. She received Olanzapine x 1 during a delirium episode early in the admission which was also helpful. She would like to start something for anxiety while in the hospital. Do to prolonged QT with Vtach episode, limited in anti anxiety medication options.  Ativan p.o. previously given. EKG repeated with improved QT.   - Call daughter Leelee, she will attempt to calm her down  - Continue integrative therapies   - Start Mirtazapine 15 mg at bedtime    Afib w/ RVR on Xarelto  Prolonged QTC  Hx TIA, Factor V Leiden  Metoprolol was initially on  hold for hypotension, now resumed. Did have episode of Afib w RVR to 160's day 1 after Metoprolol was held for hypotension.  - Continuous telemetry discontinued  - Avoid QT prolonging medications  -Trend and replete electrolytes as needed, K goal >4.0, Mg goal >2.5  - Continue PTA Metoprolol  - Continue Xarelto as at home  - EKG to check QT PRN    HFrEF   HTN  PTA took furosemide 80 mg total daily.  Received IV Lasix 20 mg 4/10.  Echo showed reduced EF from prior.  Hyperinflated lungs without radiographic evidence to suggest heart failure exacerbation on CXR 4/10.  Could consider stress test prior to discharge pending clinical improvement.  - Continue Lasix 20 mg daily to help with frequent urination (vs PTA 40 mg)  - Continue PTA Metoprolol to 75 mg XL daily   - Daily weights  - Strict I/O, pure wick if patient tolerates    Weight loss  Multifactorial in the setting of COPD, decreased oral intake.  Currently at weight from admission.  -RD consulted, appreciate recommendations and assistance     ANATOLIY on CKD, Improved  Admission Cr 2.44 BUN 36 with a variable baseline 1.5-2.0.    - Daily BMP  - Avoid nephrotoxins    Delirium, Resolved  Agitation  Gabapentin was decreased to 200 mg twice daily.  Vesicare and Myrbetriq were discontinued.      Overactive bladder  Discontinued PTA Mirabegron 50 mg and Solifenacin 5 mg in the setting of acute delirium.    Fragile skin  -Wrap IV, avoid adhesives  -WOC  - Calmoseptine PRN    Goals of Care  DNR/DNI  Overall, poor long term prognosis. Has severe COPD with frequent recurrent infections. Patient expressed not wanting to return to hospital. Daughter, Leelee, is helping with her healthcare management.   - Palliative care consulted, appreciate recs, signed off 4/14  - DNR/DNI    Chronic Conditions and Medications  Breast Cancer  DCIS, ER/WI positive, HER-2 negative, s/p left breast lumpectomy and left axillary sentinel lymph node biopsy.  - Cont. PTA Tamoxifen 20  daily  Osteoporosis  - Hold PTA VitD and Ca supplementation  Allergies  - Hold PTA Cetirizine and Flonase  GERD  - Cont. PTA 20 mg pepcid at bedtime  Normocytic normochromic anemia, stable  Admission Hgb of 10.9.  Hgbs 13.3 - 9.8 in the 2 months PTA. No ssx GI bleed.  -CBC as above  Chronic Pain secondary to T12 compression fracture  - Continue gabapentin 200 mg BID (dose decreased this admission)  - Continue Tramadol 50 mg Q12h as at home     Diet: Snacks/Supplements Adult: Ensure Enlive; With Meals  Regular Diet Adult    DVT Prophylaxis: Xarelto  Hayes Catheter: Not present  Fluids: PO  Central Lines: PRESENT  PICC Double Lumen 04/11/22 Right Brachial vein medial-Site Assessment: WDL  Cardiac Monitoring: None  Code Status: No CPR- Do NOT Intubate      Disposition Plan   Expected Discharge: 2-3 days   Anticipated discharge location: home with family;home with help/services       The patient's care was discussed with the Attending Physician, Dr. Irene Mukherjee.     Discussed with Leelee on the phone.     Irene Lombardo MD   [facility]  [facility]  _______________________________________________________    Interval History    Patient feeling better, is feeling cold, daughter bringing extra blanket. She is still anxious about diagnosis and the slight bump in her lab work today. Stools improving.     Physical Exam   Vital Signs: Temp: 97.6  F (36.4  C) Temp src: Oral BP: (!) 143/61 Pulse: 83   Resp: 20 SpO2: 92 % O2 Device: Nasal cannula Oxygen Delivery: 2 LPM  Weight: 112 lbs 9.6 oz  Physical Exam   Constitutional: Awake, alert, cooperative, oriented to person, place, and day. Appears anxious  Eyes: Lids and lashes normal, extra ocular muscles intact, sclera clear, conjunctiva normal.  ENT: Normocephalic, without obvious abnormality, atraumatic  Lungs:  LS left mid/upper posterior with coarse breath sounds. Right clear, but poor air exchange  Cardiovascular: Regular rate and  rhythm, normal S1 and S2  Abdomen: Soft, non tender, no distention or guarding  Neurologic: Awake and alert. Cranial nerves II-XII are grossly intact.  Neuropsychiatric: Normal affect, mood, orientation, memory seems poor      Data   Recent Labs   Lab 04/20/22  0620 04/19/22  0444 04/18/22  0741   WBC 22.1* 18.7* 31.4*   HGB 10.4* 8.9* 10.2*   MCV 90 87 86    342 414    142 142   POTASSIUM 4.2 3.8 3.6   CHLORIDE 105 106 105   CO2 26 30 28   BUN 19 20 20   CR 0.92 0.94 0.91   ANIONGAP 10 6 9   MESSI 8.4* 7.8* 8.0*   GLC 80 86 83   ALBUMIN  --  1.7*  --    PROTTOTAL  --  4.8*  --    BILITOTAL  --  0.3  --    ALKPHOS  --  54  --    ALT  --  11  --    AST  --  14  --      No results found for this or any previous visit (from the past 24 hour(s)).  Medications     - MEDICATION INSTRUCTIONS -         famotidine  20 mg Oral At Bedtime     fidaxomicin  200 mg Oral BID     fluticasone  1 spray Both Nostrils Daily     [Held by provider] fluticasone-vilanterol  1 puff Inhalation At Bedtime    And     [Held by provider] umeclidinium  1 puff Inhalation At Bedtime     furosemide  20 mg Oral Daily     gabapentin  200 mg Oral QAM     gabapentin  300 mg Oral At Bedtime     guaiFENesin  1,200 mg Oral BID     ipratropium - albuterol 0.5 mg/2.5 mg/3 mL  3 mL Nebulization 4x daily     lactobacillus rhamnosus (GG)  1 capsule Oral BID     melatonin  5 mg Oral At Bedtime     meropenem  1 g Intravenous Q12H     metoprolol succinate ER  75 mg Oral At Bedtime     multivitamin RENAL  1 tablet Oral Daily     predniSONE  10 mg Oral Daily    Followed by     [START ON 4/22/2022] predniSONE  5 mg Oral Daily     rivaroxaban ANTICOAGULANT  15 mg Oral At Bedtime     simvastatin  40 mg Oral At Bedtime     sodium chloride  3 mL Nebulization 4x daily     tamoxifen  20 mg Oral QPM     traMADol  50 mg Oral BID     vancomycin  125 mg Oral 4x Daily     CT Chest: 4/8/22  IMPRESSION:  1.  Moderate left upper lobe consolidation. This is consistent  with pneumonia. Recommend a follow-up chest radiograph in 4-6 weeks to document resolution.  CT Chest: 4/16/22  1. There is new cavity formation involving the left upper lobe pneumonia with a 5 cm cavity now present within the densely consolidated lung.

## 2022-04-20 NOTE — PROGRESS NOTES
Patient received scheduled nebs.  Pt is on 2L NC with sats of 93% and coarse breath sounds.  RT will follow.      Mahdi CABRERA Villalpando, RT

## 2022-04-21 ENCOUNTER — APPOINTMENT (OUTPATIENT)
Dept: PHYSICAL THERAPY | Facility: CLINIC | Age: 77
DRG: 177 | End: 2022-04-21
Payer: MEDICARE

## 2022-04-21 ENCOUNTER — APPOINTMENT (OUTPATIENT)
Dept: OCCUPATIONAL THERAPY | Facility: CLINIC | Age: 77
DRG: 177 | End: 2022-04-21
Payer: MEDICARE

## 2022-04-21 LAB
ANION GAP SERPL CALCULATED.3IONS-SCNC: 7 MMOL/L (ref 5–18)
B DERMAT AB SER QL ID: NORMAL
BASOPHILS # BLD AUTO: 0 10E3/UL (ref 0–0.2)
BASOPHILS NFR BLD AUTO: 0 %
BUN SERPL-MCNC: 17 MG/DL (ref 8–28)
C REACTIVE PROTEIN LHE: 4.8 MG/DL (ref 0–0.8)
CALCIUM SERPL-MCNC: 7.4 MG/DL (ref 8.5–10.5)
CHLORIDE BLD-SCNC: 111 MMOL/L (ref 98–107)
CO2 SERPL-SCNC: 26 MMOL/L (ref 22–31)
CREAT SERPL-MCNC: 0.79 MG/DL (ref 0.6–1.1)
EOSINOPHIL # BLD AUTO: 0.1 10E3/UL (ref 0–0.7)
EOSINOPHIL NFR BLD AUTO: 1 %
ERYTHROCYTE [DISTWIDTH] IN BLOOD BY AUTOMATED COUNT: 15.2 % (ref 10–15)
GFR SERPL CREATININE-BSD FRML MDRD: 77 ML/MIN/1.73M2
GLUCOSE BLD-MCNC: 75 MG/DL (ref 70–125)
HCT VFR BLD AUTO: 27.3 % (ref 35–47)
HGB BLD-MCNC: 8.7 G/DL (ref 11.7–15.7)
IMM GRANULOCYTES # BLD: 0.1 10E3/UL
IMM GRANULOCYTES NFR BLD: 1 %
LYMPHOCYTES # BLD AUTO: 1.4 10E3/UL (ref 0.8–5.3)
LYMPHOCYTES NFR BLD AUTO: 11 %
MAGNESIUM SERPL-MCNC: 2.1 MG/DL (ref 1.8–2.6)
MCH RBC QN AUTO: 28.3 PG (ref 26.5–33)
MCHC RBC AUTO-ENTMCNC: 31.9 G/DL (ref 31.5–36.5)
MCV RBC AUTO: 89 FL (ref 78–100)
MONOCYTES # BLD AUTO: 1.3 10E3/UL (ref 0–1.3)
MONOCYTES NFR BLD AUTO: 10 %
NEUTROPHILS # BLD AUTO: 10.8 10E3/UL (ref 1.6–8.3)
NEUTROPHILS NFR BLD AUTO: 77 %
NRBC # BLD AUTO: 0 10E3/UL
NRBC BLD AUTO-RTO: 0 /100
PHOSPHATE SERPL-MCNC: 3.2 MG/DL (ref 2.5–4.5)
PLATELET # BLD AUTO: 352 10E3/UL (ref 150–450)
POTASSIUM BLD-SCNC: 3.5 MMOL/L (ref 3.5–5)
PROCALCITONIN SERPL-MCNC: 0.07 NG/ML (ref 0–0.49)
RBC # BLD AUTO: 3.07 10E6/UL (ref 3.8–5.2)
SCANNED LAB RESULT: NORMAL
SCANNED LAB RESULT: NORMAL
SODIUM SERPL-SCNC: 144 MMOL/L (ref 136–145)
WBC # BLD AUTO: 13.8 10E3/UL (ref 4–11)

## 2022-04-21 PROCEDURE — 250N000013 HC RX MED GY IP 250 OP 250 PS 637: Performed by: FAMILY MEDICINE

## 2022-04-21 PROCEDURE — 85025 COMPLETE CBC W/AUTO DIFF WBC: CPT | Performed by: STUDENT IN AN ORGANIZED HEALTH CARE EDUCATION/TRAINING PROGRAM

## 2022-04-21 PROCEDURE — 250N000013 HC RX MED GY IP 250 OP 250 PS 637: Performed by: STUDENT IN AN ORGANIZED HEALTH CARE EDUCATION/TRAINING PROGRAM

## 2022-04-21 PROCEDURE — 87205 SMEAR GRAM STAIN: CPT

## 2022-04-21 PROCEDURE — 250N000013 HC RX MED GY IP 250 OP 250 PS 637: Performed by: INTERNAL MEDICINE

## 2022-04-21 PROCEDURE — 250N000013 HC RX MED GY IP 250 OP 250 PS 637

## 2022-04-21 PROCEDURE — 80048 BASIC METABOLIC PNL TOTAL CA: CPT | Performed by: STUDENT IN AN ORGANIZED HEALTH CARE EDUCATION/TRAINING PROGRAM

## 2022-04-21 PROCEDURE — 84145 PROCALCITONIN (PCT): CPT | Performed by: INTERNAL MEDICINE

## 2022-04-21 PROCEDURE — 97110 THERAPEUTIC EXERCISES: CPT | Mod: GO

## 2022-04-21 PROCEDURE — 84100 ASSAY OF PHOSPHORUS: CPT | Performed by: STUDENT IN AN ORGANIZED HEALTH CARE EDUCATION/TRAINING PROGRAM

## 2022-04-21 PROCEDURE — 999N000157 HC STATISTIC RCP TIME EA 10 MIN

## 2022-04-21 PROCEDURE — 86140 C-REACTIVE PROTEIN: CPT | Performed by: STUDENT IN AN ORGANIZED HEALTH CARE EDUCATION/TRAINING PROGRAM

## 2022-04-21 PROCEDURE — 250N000009 HC RX 250: Performed by: FAMILY MEDICINE

## 2022-04-21 PROCEDURE — 250N000009 HC RX 250: Performed by: STUDENT IN AN ORGANIZED HEALTH CARE EDUCATION/TRAINING PROGRAM

## 2022-04-21 PROCEDURE — 99233 SBSQ HOSP IP/OBS HIGH 50: CPT | Performed by: INTERNAL MEDICINE

## 2022-04-21 PROCEDURE — 97116 GAIT TRAINING THERAPY: CPT | Mod: GP

## 2022-04-21 PROCEDURE — 250N000011 HC RX IP 250 OP 636: Performed by: STUDENT IN AN ORGANIZED HEALTH CARE EDUCATION/TRAINING PROGRAM

## 2022-04-21 PROCEDURE — 250N000012 HC RX MED GY IP 250 OP 636 PS 637

## 2022-04-21 PROCEDURE — 120N000001 HC R&B MED SURG/OB

## 2022-04-21 PROCEDURE — 83735 ASSAY OF MAGNESIUM: CPT | Performed by: STUDENT IN AN ORGANIZED HEALTH CARE EDUCATION/TRAINING PROGRAM

## 2022-04-21 PROCEDURE — 94640 AIRWAY INHALATION TREATMENT: CPT | Mod: 76

## 2022-04-21 PROCEDURE — 99232 SBSQ HOSP IP/OBS MODERATE 35: CPT | Mod: GC

## 2022-04-21 RX ORDER — MEROPENEM 1 G/1
1 INJECTION, POWDER, FOR SOLUTION INTRAVENOUS EVERY 12 HOURS
Qty: 42 EACH | Refills: 0
Start: 2022-04-21 | End: 2022-04-22

## 2022-04-21 RX ORDER — VANCOMYCIN HYDROCHLORIDE 125 MG/1
125 CAPSULE ORAL 4 TIMES DAILY
Qty: 56 CAPSULE | Refills: 0
Start: 2022-04-21 | End: 2022-04-22

## 2022-04-21 RX ORDER — VANCOMYCIN HYDROCHLORIDE 125 MG/1
125 CAPSULE ORAL DAILY
Qty: 7 CAPSULE | Refills: 0
Start: 2022-05-13 | End: 2022-04-22

## 2022-04-21 RX ORDER — VANCOMYCIN HYDROCHLORIDE 125 MG/1
125 CAPSULE ORAL
Qty: 14 CAPSULE | Refills: 0
Start: 2022-05-20 | End: 2022-04-22

## 2022-04-21 RX ORDER — VANCOMYCIN HYDROCHLORIDE 125 MG/1
125 CAPSULE ORAL DAILY
Status: DISCONTINUED | OUTPATIENT
Start: 2022-05-13 | End: 2022-04-22 | Stop reason: HOSPADM

## 2022-04-21 RX ORDER — VANCOMYCIN HYDROCHLORIDE 125 MG/1
125 CAPSULE ORAL 4 TIMES DAILY
Status: DISCONTINUED | OUTPATIENT
Start: 2022-04-21 | End: 2022-04-22 | Stop reason: HOSPADM

## 2022-04-21 RX ORDER — VANCOMYCIN HYDROCHLORIDE 125 MG/1
125 CAPSULE ORAL
Status: DISCONTINUED | OUTPATIENT
Start: 2022-05-20 | End: 2022-04-22 | Stop reason: HOSPADM

## 2022-04-21 RX ORDER — VANCOMYCIN HYDROCHLORIDE 125 MG/1
125 CAPSULE ORAL 2 TIMES DAILY
Qty: 14 CAPSULE | Refills: 0
Start: 2022-05-05 | End: 2022-04-22

## 2022-04-21 RX ORDER — VANCOMYCIN HYDROCHLORIDE 125 MG/1
125 CAPSULE ORAL 2 TIMES DAILY
Status: DISCONTINUED | OUTPATIENT
Start: 2022-05-05 | End: 2022-04-22 | Stop reason: HOSPADM

## 2022-04-21 RX ORDER — VANCOMYCIN HYDROCHLORIDE 125 MG/1
125 CAPSULE ORAL 4 TIMES DAILY
Qty: 56 CAPSULE | Refills: 0 | Status: CANCELLED | OUTPATIENT
Start: 2022-04-21

## 2022-04-21 RX ADMIN — GUAIFENESIN 1200 MG: 600 TABLET ORAL at 09:00

## 2022-04-21 RX ADMIN — RIVAROXABAN 15 MG: 15 TABLET, FILM COATED ORAL at 21:16

## 2022-04-21 RX ADMIN — TRAMADOL HYDROCHLORIDE 50 MG: 50 TABLET, COATED ORAL at 21:16

## 2022-04-21 RX ADMIN — Medication 1 CAPSULE: at 21:15

## 2022-04-21 RX ADMIN — IPRATROPIUM BROMIDE AND ALBUTEROL SULFATE 3 ML: 2.5; .5 SOLUTION RESPIRATORY (INHALATION) at 08:11

## 2022-04-21 RX ADMIN — FIDAXOMICIN 200 MG: 200 TABLET, FILM COATED ORAL at 09:00

## 2022-04-21 RX ADMIN — IPRATROPIUM BROMIDE AND ALBUTEROL SULFATE 3 ML: 2.5; .5 SOLUTION RESPIRATORY (INHALATION) at 13:13

## 2022-04-21 RX ADMIN — FUROSEMIDE 20 MG: 20 TABLET ORAL at 09:00

## 2022-04-21 RX ADMIN — MIRTAZAPINE 15 MG: 15 TABLET, FILM COATED ORAL at 21:15

## 2022-04-21 RX ADMIN — GUAIFENESIN 1200 MG: 600 TABLET ORAL at 21:15

## 2022-04-21 RX ADMIN — GABAPENTIN 200 MG: 100 CAPSULE ORAL at 06:31

## 2022-04-21 RX ADMIN — SODIUM CHLORIDE SOLN NEBU 3% 3 ML: 3 NEBU SOLN at 17:30

## 2022-04-21 RX ADMIN — VANCOMYCIN HYDROCHLORIDE 125 MG: 125 CAPSULE ORAL at 09:00

## 2022-04-21 RX ADMIN — MEROPENEM 1 G: 1 INJECTION, POWDER, FOR SOLUTION INTRAVENOUS at 16:46

## 2022-04-21 RX ADMIN — PREDNISONE 10 MG: 5 TABLET ORAL at 09:00

## 2022-04-21 RX ADMIN — GABAPENTIN 300 MG: 300 CAPSULE ORAL at 21:16

## 2022-04-21 RX ADMIN — VANCOMYCIN HYDROCHLORIDE 125 MG: 125 CAPSULE ORAL at 18:12

## 2022-04-21 RX ADMIN — B-COMPLEX W/ C & FOLIC ACID TAB 1 MG 1 TABLET: 1 TAB at 09:00

## 2022-04-21 RX ADMIN — METOPROLOL SUCCINATE 75 MG: 50 TABLET, EXTENDED RELEASE ORAL at 21:16

## 2022-04-21 RX ADMIN — VANCOMYCIN HYDROCHLORIDE 125 MG: 125 CAPSULE ORAL at 13:48

## 2022-04-21 RX ADMIN — VANCOMYCIN HYDROCHLORIDE 125 MG: 125 CAPSULE ORAL at 22:23

## 2022-04-21 RX ADMIN — MEROPENEM 1 G: 1 INJECTION, POWDER, FOR SOLUTION INTRAVENOUS at 04:48

## 2022-04-21 RX ADMIN — SODIUM CHLORIDE SOLN NEBU 3% 3 ML: 3 NEBU SOLN at 08:11

## 2022-04-21 RX ADMIN — TRAMADOL HYDROCHLORIDE 50 MG: 50 TABLET, COATED ORAL at 09:00

## 2022-04-21 RX ADMIN — SODIUM CHLORIDE SOLN NEBU 3% 3 ML: 3 NEBU SOLN at 13:13

## 2022-04-21 RX ADMIN — ANORECTAL OINTMENT: 15.7; .44; 24; 20.6 OINTMENT TOPICAL at 05:13

## 2022-04-21 RX ADMIN — FIDAXOMICIN 200 MG: 200 TABLET, FILM COATED ORAL at 21:15

## 2022-04-21 RX ADMIN — SODIUM CHLORIDE SOLN NEBU 3% 3 ML: 3 NEBU SOLN at 20:42

## 2022-04-21 RX ADMIN — Medication 1 CAPSULE: at 09:00

## 2022-04-21 RX ADMIN — IPRATROPIUM BROMIDE AND ALBUTEROL SULFATE 3 ML: 2.5; .5 SOLUTION RESPIRATORY (INHALATION) at 17:30

## 2022-04-21 RX ADMIN — TAMOXIFEN CITRATE 20 MG: 10 TABLET ORAL at 21:17

## 2022-04-21 RX ADMIN — SIMVASTATIN 40 MG: 40 TABLET, FILM COATED ORAL at 21:15

## 2022-04-21 RX ADMIN — FAMOTIDINE 20 MG: 20 TABLET ORAL at 21:15

## 2022-04-21 RX ADMIN — IPRATROPIUM BROMIDE AND ALBUTEROL SULFATE 3 ML: 2.5; .5 SOLUTION RESPIRATORY (INHALATION) at 20:42

## 2022-04-21 ASSESSMENT — ACTIVITIES OF DAILY LIVING (ADL)
ADLS_ACUITY_SCORE: 13
ADLS_ACUITY_SCORE: 9
ADLS_ACUITY_SCORE: 15
ADLS_ACUITY_SCORE: 9
ADLS_ACUITY_SCORE: 15
ADLS_ACUITY_SCORE: 9
ADLS_ACUITY_SCORE: 13
ADLS_ACUITY_SCORE: 9
ADLS_ACUITY_SCORE: 15
ADLS_ACUITY_SCORE: 9
ADLS_ACUITY_SCORE: 13
ADLS_ACUITY_SCORE: 9
ADLS_ACUITY_SCORE: 15
ADLS_ACUITY_SCORE: 9
ADLS_ACUITY_SCORE: 9

## 2022-04-21 NOTE — PROGRESS NOTES
INFECTIOUS DISEASE FOLLOW UP NOTE  04/21/2022      ASSESSMENT:    1. Large Cavitary pneumonia. Abscess. Cavitary Mass? Active issue- improving  2. Rapid progression into cavitation in hospital while on Zosyn. Radiographic changes may not necessarily equate to antibiotic failure -- likely strep, anaerobes, sometimes gram negatives.   3. With elevated procalcitonin and significant leukocytosis and poor dentition this suggests polymicrobial bacterial infection  4. However what is concerning is progressive decline over many months and 40 pound weight loss which could signal either malignancy or chronic infection such as mycobacterial or fungal, with mediastinal lymphadenopathy visualized on CT chest.   5. AFB smear negative x 3, cultures in process  6. Fungal Assay--Aspergillus, Cryptococcal Antigen negative. Beta D Glucan negative. Additional fungal assays pending          AFB Smear negative x 3          PLAN:      1. On meropenem, expect IV course-4-6 weeks per Dr. Travis,  with imaging follow up. Cultures in process and mixed john to date. Orders placed under discharge navigator  2. AFB smear negative x 3, cultures in process. Discontinued Airborne Isolation on 4/20/2022  3. Oral vancomycin for C diff. Tapering   125 mg orally four times daily for 14 days   125 mg orally twice daily for7 days  125 mg orally once daily for 7 days  125 mg orally every other day for 7 days  125 mg orally every 3 days for 28 days  4. Monitor CBC, CMP  5. Pulmonary consult reviewed.  No plans for bronchoscopy at this time as patient refused  6. Fungal assays  7. Patient new to me on 4/18/2022. Please see previous notes by ID. Follow up with me or Dr. Travis in 2 weeks (if I am not available)  8. Repeat imaging per Pulmonary  9. Discussed with patient and questions answered  10. Discussed with primary team, PGY-1 resident.   11. Anticipate discharge to TCU once bed available. ID will sign off. Please call with questions.     Lady  "MD Salomon  [facility]  [phone]        ______________________________________________________________________    SUBJECTIVE / INTERVAL HISTORY:     Doing better  Stool soft  Breathing on 2L supplemental oxygen-- cough, productive, unable to expectorate and swallows the sputum  Very weak  Awaiting discharge to [facility]  Updated patient about the AFB smear results    Chart reviewed: airborne isolation discontinued as negative AFB Smear x 3    Previous note:  Doing slightly better.   Discussed labs, count improving    Previous note: coughs. Temps ok. Stools forming up.   No known TB exposures. Previous travel to [place] years ago.       ROS: deconditioned. All other systems negative except as listed above.        OBJECTIVE:  BP (!) 183/77 (BP Location: Left arm, Patient Position: Sitting, Cuff Size: Adult Small)   Pulse 68   Temp 97.7  F (36.5  C) (Oral)   Resp 20   Ht 1.499 m (4' 11\")   Wt 50.8 kg (112 lb 1.6 oz)   SpO2 93%   BMI 22.64 kg/m         Vital Signs  Temp: 97.5  F (36.4  C)  Temp src: Oral  Resp: 18  Pulse: 75  Pulse Rate Source: Monitor  BP: 134/60  BP Location: Left arm    Temp (24hrs), Av.5  F (36.4  C), Min:97.2  F (36.2  C), Max:98.1  F (36.7  C)    GEN: Appears fatigued, thin,  NC O2 2L (baseline)  RESPIRATORY:  Coughing, resonant,. Decreased left side  CARDIOVASCULAR:  Regular rate and rhythm. Normal S1 and S2. previously  ABDOMEN:  Previous note: Soft, normal bowel sounds, non-tender, no masses.  EXTREMITIES: No edema.  SKIN/HAIR/NAILS:  LE Ecchymosis.   IV: PICC    Antibiotics:  Meropenem 4/15-  Vancomycin PO -  Fidaxomicin      previous  Pip/tazo -  Augmentin -  Doxy -  Levofloxacin 4/14    Pertinent labs:    Recent Labs   Lab 22  0608 22  0620 22  0444   WBC 13.8* 22.1* 18.7*   HGB 8.7* 10.4* 8.9*   HCT 27.3* 33.0* 27.5*    403 342        Recent Labs   Lab 22  0608 22  0620 " 22  0444    141 142   CO2 26 26 30   BUN 17 19 20        Lab Results   Component Value Date    CRP 4.8 (H) 2022    CRP 5.9 (H) 2022    CRP 5.7 (H) 2022       Lab Results   Component Value Date    ALT 11 2022    AST 14 2022    ALKPHOS 54 2022         MICROBIOLOGY DATA:  c diff positive   Blood negative ,  sputum mixedflora on gram stain -- culture not done due to squamous epi cells    sputum too many squamous cells  AFB pending  Crypto negative  aspergillus pending    RADIOLOGY:  NM Lung Scan Perfusion Particulate    Result Date: 2022  EXAM: NM LUNG SCAN PERFUSION PARTICULATE LOCATION: [facility] DATE/TIME: 2022 5:18 PM INDICATION: PE suspected, high prob COMPARISON: Chest x-ray 2022, V/Q scan 2020, CT chest exam 2019 TECHNIQUE: 7.9 mCi technetium-99m MAA, IV. Standard lung perfusion imaging. FINDINGS: Heterogeneous perfusion throughout the left lung with numerous small subsegmental perfusion defects similar to the previous VQ scan. Perfusion to the right lung is more homogeneous, with a few subtle areas of decreased activity also similar to the prior exam. Chest x-ray from today demonstrates emphysema with diffuse opacities in the left upper and mid left lung suggestive of pneumonia.     IMPRESSION: 1.  Chronic perfusion abnormalities throughout both lungs similar to the previous exam. No new perfusion abnormalities. Recent chest x-ray is suggestive of left mid and upper lung pneumonia.    Echocardiogram Complete    Result Date: 2022  [id number]^TEREZA^GERALD^[place] [address]  Name: JUDY ZARCO MRN: [id number] : 1945 Study Date: 2022 02:58 PM Age: 77 yrs Gender: Female Patient Location: [facility] Reason For Study: Syncope Ordering Physician: GERALD STARKS Performed By: SUGEY  BSA: 1.4 m2 Height: 59 in Weight:  112 lb ______________________________________________________________________________ Procedure Complete Portable Echo Adult. ______________________________________________________________________________ Interpretation Summary  1. The left ventricle is normal in size. Left ventricular systolic performance is moderately reduced. The ejection fraction is estimated to be 35-40%. 2. There is moderate global reduction in left ventricular systolic performance. 3. There is abnormal septal motion possibly related to altered electrical activation due to bundle branch block. 4. There is mild-moderate, to perhaps moderate, tricuspid insufficiency. 5. Normal right ventricular size with mildly reduced right ventricular systolic performance. 6. There is mild right atrial enlargement. 7. Right ventricular systolic pressure relative to right atrial pressure is mildly increased. The pulmonary artery pressure is estimated to be 45-50 mmHg plus right atrial pressure (the IVC is mildly dilated).  When compared to the prior real-time echocardiogram dated 28 July 2021, there has been a mild further diminution in left ventricular systolic performance (it is this reader's impression that left ventricular systolic performance was mild-moderately reduced with an ejection fraction 40-45% on the prior examination). ______________________________________________________________________________ Left ventricle: The left ventricle is normal in size. Left ventricular systolic performance is moderately reduced. The ejection fraction is estimated to be 35-40%. There is moderate global reduction in left ventricular systolic performance. There is abnormal septal motion possibly related to altered electrical activation due to bundle branch block. Left ventricular wall thickness is normal. 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  Assessment of left atrial pressure (LAP): The cumulative findings are indeterminate in  evaluating left atrial pressure.  Right ventricle: Normal right ventricular size with mildly reduced right ventricular systolic performance.  Left atrium: There is borderline left atrial enlargement.  Right atrium: There is mild right atrial enlargement.  IVC: The IVC is mildly dilated.  Aortic valve: The aortic valve is comprised of three cusps.  Mitral valve: There is mild mitral annular calcification. There is trace mitral insufficiency.  Tricuspid valve: The tricuspid valve is grossly morphologically normal. There is mild-moderate, to perhaps moderate, tricuspid insufficiency.  Pulmonic valve: The pulmonic valve is grossly morphologically normal.  Thoracic aorta: The aortic root and proximal ascending aorta are of normal dimension.  Pericardium: There is no significant pericardial effusion. ______________________________________________________________________________ ______________________________________________________________________________ MMode/2D Measurements & Calculations RVDd: 3.5 cm IVSd: 0.98 cm LVIDd: 4.1 cm LVIDs: 3.3 cm LVPWd: 0.90 cm FS: 19.4 % LV mass(C)d: 122.9 grams LV mass(C)dI: 85.3 grams/m2 Ao root diam: 2.8 cm LA dimension: 3.5 cm asc Aorta Diam: 2.8 cm LA/Ao: 1.2 LVOT diam: 1.8 cm LVOT area: 2.6 cm2  LA Volume Indexed (AL/bp): 28.8 ml/m2 RWT: 0.44  Time Measurements MM HR: 98.0 BPM  Doppler Measurements & Calculations MV E max carolyn: 89.4 cm/sec MV A max carolyn: 135.9 cm/sec MV E/A: 0.66 MV dec time: 0.12 sec LV V1 max PG: 3.2 mmHg LV V1 max: 89.2 cm/sec LV V1 VTI: 18.3 cm SV(LVOT): 48.2 ml SI(LVOT): 33.4 ml/m2 TR max carolyn: 348.4 cm/sec TR max P.6 mmHg E/E' av.9 Lateral E/e': 11.0 Medial E/e': 16.8  ______________________________________________________________________________ Report approved by: Deepak Anderson 2022 04:24 PM       US Lower Extremity Venous Duplex Bilateral    Result Date: 2022  EXAM: US LOWER EXTREMITY VENOUS DUPLEX BILATERAL LOCATION: [facility] DATE/TIME: 4/7/2022 3:49 PM INDICATION: hypoxia, hypotension; leg swelling COMPARISON: None. TECHNIQUE: Venous Duplex ultrasound of bilateral lower extremities with and without compression, augmentation and duplex. Color flow and spectral Doppler with waveform analysis performed. FINDINGS: Exam includes the common femoral, femoral, popliteal veins as well as segmentally visualized deep calf veins and greater saphenous vein. RIGHT: No deep vein thrombosis. No superficial thrombophlebitis. No popliteal cyst. LEFT: No deep vein thrombosis. No superficial thrombophlebitis. No popliteal cyst.     IMPRESSION: 1.  No deep venous thrombosis in the bilateral lower extremities.    XR Chest Port 1 View    Result Date: 4/14/2022  EXAM: XR CHEST PORT 1 VIEW LOCATION: [facility] DATE/TIME: 4/14/2022 10:22 AM INDICATION: Worsening shortness of breath, coarse breath sounds. COMPARISON: 04/10/2022. Others.     IMPRESSION: Persistent dense left upper lobe consolidation. The surrounding opacities are stable to perhaps marginally improved, though the dense consolidative abnormality is without substantial change. Increased lucency superior to the consolidation could be from aerated lung or developing cavitation. Recommend continued follow-up. Small left pleural effusion. Right lung is grossly clear. Stable cardiomediastinal silhouette. Atherosclerosis. Right PICC tip near the distal SVC. NOTE: ABNORMAL REPORT THE DICTATION ABOVE DESCRIBES AN ABNORMALITY FOR WHICH FOLLOW-UP IS NEEDED.     XR Chest Port 1 View    Result Date: 4/10/2022  EXAM: XR CHEST PORTABLE 1 VIEW LOCATION: [facility] DATE/TIME: 04/10/2022, 7:56 AM INDICATION: Shortness of breath. COMPARISON: CT of the chest, abdomen, and pelvis performed 04/08/2022. Chest radiograph performed 04/07/2022.     IMPRESSION: Ill-defined consolidation and infiltrate in the left upper lobe is again noted. Mild  hyperinflation both lungs. The right lung is otherwise clear. Tortuous calcified thoracic aorta. Heart size has increased in prominence compared to 04/07/2022. Pulmonary vascularity is within normal limits where seen. Old right rib fractures. Right PICC line has been removed.     XR Chest Port 1 View    Result Date: 4/7/2022  EXAM: XR CHEST PORT 1 VIEW LOCATION: [facility] DATE/TIME: 4/7/2022 4:49 PM INDICATION: Cough. COMPARISON: 06/02/2021.     IMPRESSION: New moderate opacity in the left mid to upper lung suggestive of pneumonia given history of cough. However, this requires follow-up to complete resolution to exclude an underlying lesion (6-8 week follow-up radiographs). Right PICC tip near the cavoatrial junction. Normal heart size. Atherosclerosis. No significant pleural effusion.     CT Chest Abdomen Pelvis w/o Contrast    Result Date: 4/8/2022  EXAM: CT CHEST ABDOMEN PELVIS W/O CONTRAST LOCATION: [facility] DATE/TIME: 4/8/2022 2:17 PM INDICATION: History of breast cancer. Pneumonia. Weight loss. COMPARISON: CT chest 6/12/2020, CT chest/abdomen/pelvis 6/11/2019 TECHNIQUE: CT scan of the chest, abdomen, and pelvis was performed without IV contrast. Multiplanar reformats were obtained. Dose reduction techniques were used. CONTRAST: None. FINDINGS: LUNGS AND PLEURA: Moderate irregular consolidation in the posterior left upper lobe. Confluent emphysema. Mild bibasilar atelectasis/scar. Trace pleural effusions. Stable mild diffuse bronchial wall thickening. A few stable benign pulmonary nodules. MEDIASTINUM/AXILLAE: Right PICC. Mediastinal lymphadenopathy with the largest largest being a right paratracheal node measuring 1.6 cm in short axis, image 48:3. Small pericardial effusion. The main pulmonary artery is enlarged at 3.0 cm which may be seen with pulmonary artery hypertension. CORONARY ARTERY CALCIFICATION: Moderate. HEPATOBILIARY: Normal. PANCREAS: Normal.  SPLEEN: Normal. ADRENAL GLANDS: Normal. KIDNEYS/BLADDER: Low-lying right kidney. Subcentimeter renal hypodensities which are too small to characterize. No hydronephrosis. BOWEL: No obstruction or inflammatory change. Normal appendix. LYMPH NODES: Normal. VASCULATURE: Stable ectasia of the abdominal aorta measuring 2.9 x 2.3 cm, image 169:3. Moderate atherosclerosis. PELVIC ORGANS: Normal. MUSCULOSKELETAL: Left hip arthroplasty. Osseous demineralization. Degenerative changes of the spine. Remote left rib fractures. T12 compression fracture with 80% loss of height which is likely remote.     IMPRESSION: 1.  Moderate left upper lobe consolidation. This is consistent with pneumonia. Recommend a follow-up chest radiograph in 4-6 weeks to document resolution. 2.  Mediastinal lymphadenopathy. 3.  No acute abnormality in the abdomen or pelvis.    CT Chest w/o Contrast    Result Date: 4/15/2022  EXAM: CT CHEST W/O CONTRAST LOCATION: [facility] DATE/TIME: 4/15/2022 1:21 PM INDICATION: Pneumonia, effusion or abscess suspected, xray done COMPARISON: CT 4/8/2022 TECHNIQUE: CT chest without IV contrast. Multiplanar reformats were obtained. Dose reduction techniques were used. CONTRAST: None. FINDINGS: LUNGS AND PLEURA: There is now prominent cavity within the consolidated focus left upper lobe. Cavity measures approximately 5 cm with surrounding thick wall and lung consolidation surrounding fat. Mild increase in overall size of left upper lobe involvement. Tiny left pleural effusion now present. Minimal linear density at lung bases. Slight mucus plugging seen posterior right lower lobe. Prominent diffuse changes of emphysema. MEDIASTINUM/AXILLAE: Small reactive lymph nodes are stable. Heavy atherosclerotic calcifications of aorta. CORONARY ARTERY CALCIFICATION: Moderate. UPPER ABDOMEN: Diffuse atherosclerotic calcifications. MUSCULOSKELETAL: Scoliosis and severe compression of T12 unchanged      IMPRESSION: 1.  There is new cavity formation involving the left upper lobe pneumonia with a 5 cm cavity now present within the densely consolidated lung. 2.  Diffuse emphysema unchanged.        Total Time Spent 40 minutes with >50% of the time spent in evaluation,chart review. Discussed with Primary team and patient.

## 2022-04-21 NOTE — PROGRESS NOTES
"/58 (BP Location: Left arm)   Pulse 78   Temp 97.9  F (36.6  C) (Oral)   Resp 18   Ht 1.499 m (4' 11\")   Wt 50.8 kg (112 lb 1.6 oz)   SpO2 96%   BMI 22.64 kg/m      Patient is on scheduled duonebs 4 times a day and sodium chloride 4 times a day. Breath sounds are diminished, strong non productive cough. On 2L NC. RT continue to follow.     Bianca Alonso, RT      "

## 2022-04-21 NOTE — PROGRESS NOTES
Patient received scheduled nebs.  Pt is on 3L NC with sats of 98% at rest and diminished breath sounds.  RT titrated NC to 2L and will continue to follow.        Mahdi CABRERA Villalpando, RT

## 2022-04-21 NOTE — PROGRESS NOTES
Care Management Follow Up    Length of Stay (days): 14    Expected Discharge Date: 04/22/2022     Concerns to be Addressed: Medical Progression and Discharge Planning    Patient plan of care discussed at interdisciplinary rounds: Yes    Anticipated Discharge Disposition: Transitional Care     Anticipated Discharge Services: None  Anticipated Discharge DME: Oxygen    Patient/family educated on Medicare website which has current facility and service quality ratings: yes  Education Provided on the Discharge Plan:  yes  Patient/Family in Agreement with the Plan: yes    Additional Information:  Ally from [facility] called to accept Pt for tomorrow 4/22. Pt updated and informed on discharge plans. Pt needs [facility] transport due to Pt's portable O2 not working. [facility] WC transport noon tomorrow 4/22. PAS completed. Left Ally SAINI to update her on discharge timing.     RODOLFO Barclay Intern

## 2022-04-21 NOTE — PLAN OF CARE
Goal Outcome Evaluation:    Plan of Care Reviewed With: patient     Overall Patient Progress: improving    Drowsy at the beginning of the shift then alert and oriented x4, able to make needs known,  denies pain, pure wick in place, PRN calmoseptine applied to buttock after miguel-care for redness,  independently turns and repositions self, on 2 L of O2 via NC, tolerating well, O2 saturation > 90%, no dyspnea noted at rest,  PICC in place and patent, lab drawn, K, Mg, and Phosphorus are all rechecks tomorrow morning

## 2022-04-21 NOTE — PROGRESS NOTES
Care Management Follow Up    Length of Stay (days): 14    Expected Discharge Date: 04/22/2022     Concerns to be Addressed: Medical Progression and Discharge Planning    Patient plan of care discussed at interdisciplinary rounds: Yes    Anticipated Discharge Disposition: TCU     Anticipated Discharge Services: TCU  Anticipated Discharge DME: None    Patient/family educated on Medicare website which has current facility and service quality ratings: yes  Education Provided on the Discharge Plan:  yes  Patient/Family in Agreement with the Plan: yes    Additional Information:  RODOLFO Intern spoke to Ally at [facility] who would be able to accept Pt imrally tomorrow 4/22 or Saturday 4/23. [facility] does not have a bed available today. Ally asked for CM to call tomorrow to discuss discharge plan and bed availability.    RODOLFO Barclay Intern

## 2022-04-21 NOTE — PROGRESS NOTES
[facility]    Progress Note - Hospitalist Service       Date of Admission:  4/7/2022    Assessment & Plan    Irene León is a 77 year old female admitted on 4/7/2022 for community acquired pneumonia in the setting of severe COPD on baseline 2L O2 by NC. PMHx also includes HFrEF, LBBB, HTN, factor V Leiden and atrial fibrillation on Xarelto, CKD stage 3, and 35+ lb unintentional weight loss in the past one year. Hospitalization was initially complicated by resolved delirium, followed by frequent stooling and diagnosis of C difficile diarrhea. After completing a course of antibiotics for pneumonia, she experienced acute worsening of respiratory status and repeat CT chest revealed interim development of a 5cm left upper lobe cavitation. Infectious disease and pulmonology were consulted.  Antibiotics were broadened to zosyn then transitioned to meropenem. Discharge plan pending continued improvement and final ID/Pulm recommendations.    Acute Hypoxic Respiratory Failure, improved  Community-acquired left mid and upper lobe pneumonia  Left cavitary lesion on chest CT  COPD Exacerbation  Elevated lactate, improved  Leukocytosis, worsening  Admitted with left upper and mid lobe pneumonia with COPD exacerbation. Has had frequent outpatient antibiotics and steroid tapers. Was recommended to use oxygen 2.5 L at home, but only uses occasional. OP pulmonologist at [facility]. CT Chest 4/8 showed moderate upper lobe consolidation. CXR 4/10 showed continued pneumonia, and WBC, CRP consistently elevated. C-diff+ 4/12. 4/14 CXR showed no improvement. 4/15 Chest CT showing: new cavity formation involving the left upper lobe pneumonia with a 5 cm cavity now present within the densely consolidated lung. ID consulted, pulmonary re-consulted. Concern for abscess, bacterial infection most likely as her WBC is improving with antibiotics. Fungal, TB cultures pending. Will need negative AFB x 3 prior to  discharge.   - Pulmonology consulted, appreciate recs, signed off 4/19   - Bronchoscopy not recommended at this time due to high risk  - Consulted ID for new cavitation, appreciate recs   - IV Meropenem    - TB, Fungal, workup, so far negative    - Sputum culture pending   - AFB negative x 3 airborne precautions discontinued   - Prednisone taper 30 mg, 20 mg, 10 mg, 5 mg, 2.5 mg for 3 days each (end, 4/27)  - Scheduled Duonebs Q4hrs with Mucomyst  - PRN Albuterol Nebs Q2Hrs  - Hold Trelegy Ellipta    - BMP, CBC, CRP daily  - Likely discharge Tomorrow or Saturday, will discuss long term recs for ABX with ID   - Repeat imaging 4-6 weeks at discharge    Hospitalization Anti-Infective's  Ceftriaxone 1 G 4/7  Doxycyline IV 4/7-4/12->PO Doxycycline 4/13  Zosyn IV 4/7-4/12->Augmentin 4/12-4/13  Levaquin 750 mg PO 4/14, then discontinued  Zosyn 4/15, then discontinued  Meropenem 1 G 4/15->  PO Vanco C-diff 4/12->  Fidaxomicin PO C-diff 4/18->    C-diff Positive  Diarrhea, improved  Patient began having loose stools during admission. C-diff+ 4/11/22.   - Vanco  mg QID x 10 days (4/12-4/21)  - Fidaxomicin PO x 10 days (4/18-)  - Continue probiotic BID  - Enteric precautions     Anxiety  Panic attack  Has had frequent episodes of anxiety overnight. Felt like she had a panic attack and claustrophobia overnight. Was given Ativan 0.5 mg x 1 and was able to sleep, found it helpful. She received Olanzapine x 1 during a delirium episode early in the admission which was also helpful. She would like to start something for anxiety while in the hospital. Do to prolonged QT with Vtach episode, limited in anti anxiety medication options.  Ativan p.o. previously given. EKG repeated with improved QT.   - Call daughter Leelee, she will attempt to calm her down  - Continue integrative therapies   - Continue Mirtazapine 15 mg at bedtime    Afib w/ RVR on Xarelto  Prolonged QTC, Improving   Hx TIA, Factor V Leiden  Metoprolol was initially  on hold for hypotension, now resumed. Did have episode of Afib w RVR to 160's day 1 after Metoprolol was held for hypotension.  - Avoid QT prolonging medications  -Trend and replete electrolytes as needed, K goal >4.0, Mg goal >2.5  - Continue PTA Metoprolol  - Continue Xarelto as at home  - EKG to check QT PRN    HFrEF   HTN  PTA took furosemide 80 mg total daily.  Received IV Lasix 20 mg 4/10.  Echo showed reduced EF from prior.  Hyperinflated lungs without radiographic evidence to suggest heart failure exacerbation on CXR 4/10.  Could consider stress test prior to discharge pending clinical improvement.  - Continue Lasix 20 mg daily to help with frequent urination (vs PTA 40 mg)  - Continue PTA Metoprolol to 75 mg XL daily   - Daily weights  - Strict I/O, pure wick if patient tolerates    Weight loss  Multifactorial in the setting of COPD, decreased oral intake.  Currently at weight from admission.  -RD consulted, appreciate recommendations and assistance  - Refusing Ensure, order discontinued      ANATOLIY on CKD, Improved  Admission Cr 2.44 BUN 36 with a variable baseline 1.5-2.0.    - Daily BMP  - Avoid nephrotoxins    Delirium, Resolved  Agitation  Gabapentin was decreased to 200 mg in AM, and 300 mg at bedtime.  Vesicare and Myrbetriq were discontinued.      Overactive bladder  Discontinued PTA Mirabegron 50 mg and Solifenacin 5 mg in the setting of acute delirium.    Fragile skin  -Wrap IV, avoid adhesives  -WOC  - Calmoseptine PRN    Goals of Care  DNR/DNI  Overall, poor long term prognosis. Has severe COPD with frequent recurrent infections. Patient expressed not wanting to return to hospital. Daughter, Leelee, is helping with her healthcare management.   - Palliative care consulted, appreciate recs, signed off 4/14  - DNR/DNI    Chronic Conditions and Medications  Breast Cancer  DCIS, ER/KS positive, HER-2 negative, s/p left breast lumpectomy and left axillary sentinel lymph node biopsy.  - Cont. PTA Tamoxifen  20 daily  Osteoporosis  - Hold PTA VitD and Ca supplementation  Allergies  - Hold PTA Cetirizine and Flonase  GERD  - Cont. PTA 20 mg pepcid at bedtime  Normocytic normochromic anemia, stable  Admission Hgb of 10.9.  Hgbs 13.3 - 9.8 in the 2 months PTA. No ssx GI bleed.  -CBC as above  Chronic Pain secondary to T12 compression fracture  - Continue gabapentin 200 mg BID (dose decreased this admission)  - Continue Tramadol 50 mg Q12h as at home     Diet: Snacks/Supplements Adult: Ensure Enlive; With Meals  Regular Diet Adult    DVT Prophylaxis: Xarelto  Hayes Catheter: Not present  Fluids: PO  Central Lines: PRESENT  PICC Double Lumen 04/11/22 Right Brachial vein medial-Site Assessment: WDL  Cardiac Monitoring: None  Code Status: No CPR- Do NOT Intubate      Disposition Plan   Expected Discharge: 2-3 days   Anticipated discharge location: home with family;home with help/services       The patient's care was discussed with the Attending Physician, Dr. Irene Mukherjee, and Dr. Jason.     Discussed with Leelee on the phone.     Irene Lombardo MD   [facility]  [facility]  _______________________________________________________    Interval History    Patient feeling better, excited to hear she is improving in he wbc and crp. Hoping to go to TCU tomorrow, per RN, [facility] has bed for tomorrow which is her first choice.     Physical Exam   Vital Signs: Temp: 97.7  F (36.5  C) Temp src: Oral BP: (!) 166/77 Pulse: 76   Resp: 17 SpO2: 93 % O2 Device: Nasal cannula Oxygen Delivery: 2 LPM  Weight: 112 lbs 1.6 oz  Physical Exam   Constitutional: Awake, alert, cooperative, oriented to person, place, and day. Up in chair, looks comfortable.   Eyes: Lids and lashes normal, extra ocular muscles intact, sclera clear, conjunctiva normal.  ENT: Normocephalic, without obvious abnormality, atraumatic  Lungs:  LS left mid/upper posterior with coarse breath sounds. Right clear, but poor air exchange,  few scattered inspiratory wheezes in anterior chest   Cardiovascular: Regular rate and rhythm, normal S1 and S2  Abdomen: Soft, non tender, no distention or guarding  Neurologic: Awake and alert. Cranial nerves II-XII are grossly intact.  Neuropsychiatric: Normal affect, mood, orientation      Data   Recent Labs   Lab 04/21/22  0608 04/20/22  0620 04/19/22  0444   WBC 13.8* 22.1* 18.7*   HGB 8.7* 10.4* 8.9*   MCV 89 90 87    403 342    141 142   POTASSIUM 3.5 4.2 3.8   CHLORIDE 111* 105 106   CO2 26 26 30   BUN 17 19 20   CR 0.79 0.92 0.94   ANIONGAP 7 10 6   MESSI 7.4* 8.4* 7.8*   GLC 75 80 86   ALBUMIN  --   --  1.7*   PROTTOTAL  --   --  4.8*   BILITOTAL  --   --  0.3   ALKPHOS  --   --  54   ALT  --   --  11   AST  --   --  14     No results found for this or any previous visit (from the past 24 hour(s)).  Medications     - MEDICATION INSTRUCTIONS -         famotidine  20 mg Oral At Bedtime     fidaxomicin  200 mg Oral BID     fluticasone  1 spray Both Nostrils Daily     [Held by provider] fluticasone-vilanterol  1 puff Inhalation At Bedtime    And     [Held by provider] umeclidinium  1 puff Inhalation At Bedtime     furosemide  20 mg Oral Daily     gabapentin  200 mg Oral QAM     gabapentin  300 mg Oral At Bedtime     guaiFENesin  1,200 mg Oral BID     ipratropium - albuterol 0.5 mg/2.5 mg/3 mL  3 mL Nebulization 4x daily     lactobacillus rhamnosus (GG)  1 capsule Oral BID     melatonin  5 mg Oral At Bedtime     meropenem  1 g Intravenous Q12H     metoprolol succinate ER  75 mg Oral At Bedtime     mirtazapine  15 mg Oral At Bedtime     multivitamin RENAL  1 tablet Oral Daily     predniSONE  10 mg Oral Daily    Followed by     [START ON 4/22/2022] predniSONE  5 mg Oral Daily     rivaroxaban ANTICOAGULANT  15 mg Oral At Bedtime     simvastatin  40 mg Oral At Bedtime     sodium chloride  3 mL Nebulization 4x daily     tamoxifen  20 mg Oral QPM     traMADol  50 mg Oral BID     vancomycin  125 mg Oral 4x  Daily     CT Chest: 4/8/22  IMPRESSION:  1.  Moderate left upper lobe consolidation. This is consistent with pneumonia. Recommend a follow-up chest radiograph in 4-6 weeks to document resolution.  CT Chest: 4/16/22  1. There is new cavity formation involving the left upper lobe pneumonia with a 5 cm cavity now present within the densely consolidated lung.

## 2022-04-21 NOTE — PROGRESS NOTES
PULMONARY / CRITICAL CARE PROGRESS NOTE    Date / Time of Admission:  4/7/2022 12:45 PM    Assessment:     Irene León is a 77 year old female with history of severe asthma- COPD overlap syndrome on O2 supplementation, HTN, non ischemic cardiomyopathy, factor V Leiden mutation on xarelto, osteoporosis, breast cancer s/p left lumpectomy.  Presents to ED for evaluation of SOB. Diagnosed with dense left upper lobe pneumonia.   Treated with broad Abx. Clinically better. Follow up chest CT scan showed dense ZONIA infiltrate and large cavitary lesion. ID service broaded ABx. AFB in sputum negative. Started on oral vancomycin for C diff infection.     1. Acute on chronic respiratory failure  2. Necrotizing pneumonia   Follow up chest CT scan showed ZONIA dense infiltrate and new 5 cm cavitary lesion.   AFB in sputum negative. Previous sputum Cx was a contaminated sample, too many epithelial cells. Serum Aspergillus galactomannan Ag was negative.   Diagnostic bronchoscopy was discussed with patient, patient has declined procedure.   Patient is clinically better, trending down WBC, and inflammatory markers.   Continue treatment per ID recommendation.   3. Asthma-COPD exacerbation   Taper down systemic steroids   4. C diff infection   5. HTN, non ischemic cardiomyopathy   6. CKD stage IV  7. Hx breast cancer s/p lumpectomy   8. Osteoporosis     Advance Directives: DNR    Plan:   1. Titrate FiO2 to keep SpO2 > 90%  2. Schedule bronchodilators  3. Taper down systemic steroids  4. Abx per ID recommendation, currently on meropenem, fidaxomicin and oral vancomycin   5. Patient will need a short follow up chest CT scan , probably 2 weeks   6. H2 blocker for GI prophylaxis   7. DVT prophylaxis anticoagulated with xarelto  8. PT, OT evaluation.     Please contact me if you have any questions.    Marcelo Melgoza  Pulmonary / Critical Care  04/21/2022  11:28 AM      Subjective:   HPI:  Irene León is a 77 year old  female with history of severe asthma- COPD overlap syndrome on O2 supplementation, elevated IgE level, HTN, non ischemic cardiomyopathy, factor V Leiden mutation on xarelto, osteoporosis, breast cancer s/p left lumpectomy.  Presents to ED for evaluation of SOB. Diagnosed with dense left upper lobe pneumonia.   Treated with broad Abx. Clinically better. Follow up chest CT scan showed dense ZONIA infiltrate and 5 cm cavitary lesion. ID service broaded ABx. AFB in sputum negative. Patient reported diarrhea, started on oral vancomycin for C diff infection. Patient has declined diagnostic bronchoscopy.     Events overnight  - Reports doing well, decrease shortness of breath  - Diagnostic bronchoscopy was again discussed with patient, patient declines procedure. Noted, clinical improvement , trending down inflammatory markers. Pros and cons were discussed, will plan to continue current treatment.     Allergies: Sulfa (sulfonamide antibiotics) [sulfa drugs], Homeopathic drugs [external allergen needs reconciliation - see comment], Mold extracts [molds & smuts], Morphine (pf) [morphine], Lisinopril, and Sulfacetamide sodium [sulfacetamide]     MEDS:  Current Facility-Administered Medications   Medication     acetaminophen (TYLENOL) tablet 1,000 mg     albuterol (PROVENTIL) neb solution 2.5 mg     benzonatate (TESSALON) capsule 100 mg     benztropine (COGENTIN) tablet 1 mg     famotidine (PEPCID) tablet 20 mg     fidaxomicin (DIFICID) tablet 200 mg     fluticasone (FLONASE) 50 MCG/ACT spray 1 spray     [Held by provider] fluticasone-vilanterol (BREO ELLIPTA) 200-25 MCG/INH inhaler 1 puff    And     [Held by provider] umeclidinium (INCRUSE ELLIPTA) 62.5 MCG/INH inhaler 1 puff     furosemide (LASIX) tablet 20 mg     gabapentin (NEURONTIN) capsule 200 mg     gabapentin (NEURONTIN) capsule 300 mg     guaiFENesin (MUCINEX) 12 hr tablet 1,200 mg     guaiFENesin (ROBITUSSIN) 20 mg/mL solution 10 mL     hydrALAZINE (APRESOLINE)  "injection 5 mg     ipratropium - albuterol 0.5 mg/2.5 mg/3 mL (DUONEB) neb solution 3 mL     lactobacillus rhamnosus (GG) (CULTURELL) capsule 1 capsule     melatonin tablet 5 mg     menthol-zinc oxide (CALMOSEPTINE) 0.44-20.6 % ointment OINT     meropenem (MERREM) 1 g vial to attach to  mL bag     metoprolol succinate ER (TOPROL-XL) 24 hr tablet 75 mg     mirtazapine (REMERON) tablet 15 mg     multivitamin RENAL (RENAVITE RX/NEPHROVITE) tablet 1 tablet     naloxone (NARCAN) injection 0.2 mg    Or     naloxone (NARCAN) injection 0.4 mg    Or     naloxone (NARCAN) injection 0.2 mg    Or     naloxone (NARCAN) injection 0.4 mg     Patient is already receiving anticoagulation with heparin, enoxaparin (LOVENOX), warfarin (COUMADIN)  or other anticoagulant medication     [START ON 4/22/2022] predniSONE (DELTASONE) tablet 5 mg     rivaroxaban ANTICOAGULANT (XARELTO) tablet 15 mg     simvastatin (ZOCOR) tablet 40 mg     sodium chloride (NEBUSAL) 3 % neb solution 3 mL     tamoxifen (NOLVADEX) tablet 20 mg     traMADol (ULTRAM) tablet 50 mg     vancomycin (VANCOCIN) capsule 125 mg       Objective:   VITALS:  BP (!) 142/65 (BP Location: Left arm)   Pulse 74   Temp 97.8  F (36.6  C) (Oral)   Resp 20   Ht 1.499 m (4' 11\")   Wt 50.8 kg (112 lb 1.6 oz)   SpO2 96%   BMI 22.64 kg/m    VENT:  Resp: 20    EXAM:   Gen: awake, alert, no distress  HEENT: pink conjunctiva, moist mucosa, Mallampati II/IV  Neck: no thyromegaly, masses or JVD  Lungs: decrease breath sounds both HT, discrete crackles left upper hemithorax  CV: regular, no murmurs or gallops appreciated  Abdomen: soft, NT, BS wnl  Ext: no edema  Neuro: CN II-XII intact, non focal       Data Review:  Recent Labs   Lab 04/21/22  0608 04/20/22  0620 04/19/22  0444 04/18/22  0741 04/17/22  0455 04/16/22  0636   GLC 75 80 86 83 71 73      04/21/22 06:08   Sodium 144   Potassium 3.5   Chloride 111 (H)   Carbon Dioxide 26   Urea Nitrogen 17   Creatinine 0.79   GFR " Estimate 77 [1]   Calcium 7.4 (L)   Anion Gap 7   Magnesium 2.1   Phosphorus 3.2      04/19/22 04:44 04/20/22 06:20 04/21/22 06:08   CRP 5.7 (H) 5.9 (H) 4.8 (H)      04/21/22 06:08   Procalcitonin 0.07 [1]      04/21/22 06:08   WBC 13.8 (H)   Hemoglobin 8.7 (L)   Hematocrit 27.3 (L)   Platelet Count 352   RBC Count 3.07 (L)   MCV 89   MCH 28.3   MCHC 31.9   RDW 15.2 (H)   % Neutrophils 77   % Lymphocytes 11   % Monocytes 10   % Eosinophils 1   % Basophils 0     CT CHEST W/O CONTRAST  LOCATION: [facility]  DATE/TIME: 4/15/2022 1:21 PM  INDICATION: Pneumonia, effusion or abscess suspected, xray done  COMPARISON: CT 4/8/2022  FINDINGS:   LUNGS AND PLEURA: There is now prominent cavity within the consolidated focus left upper lobe. Cavity measures approximately 5 cm with surrounding thick wall and lung consolidation surrounding fat. Mild increase in overall size of left upper lobe involvement.  Tiny left pleural effusion now present. Minimal linear density at lung bases. Slight mucus plugging seen posterior right lower lobe. Prominent diffuse changes of emphysema.  MEDIASTINUM/AXILLAE: Small reactive lymph nodes are stable. Heavy atherosclerotic calcifications of aorta.  CORONARY ARTERY CALCIFICATION: Moderate.  UPPER ABDOMEN: Diffuse atherosclerotic calcifications.  MUSCULOSKELETAL: Scoliosis and severe compression of T12 unchanged  IMPRESSION:   1.  There is new cavity formation involving the left upper lobe pneumonia with a 5 cm cavity now present within the densely consolidated lung.  2.  Diffuse emphysema unchanged.    By:  Marcelo Melgoza MD, 04/21/2022  11:28 AM    Primary Care Physician:  Pankaj Montanez

## 2022-04-22 VITALS
HEART RATE: 67 BPM | DIASTOLIC BLOOD PRESSURE: 69 MMHG | OXYGEN SATURATION: 97 % | RESPIRATION RATE: 18 BRPM | HEIGHT: 59 IN | SYSTOLIC BLOOD PRESSURE: 157 MMHG | TEMPERATURE: 97.7 F | BODY MASS INDEX: 22.6 KG/M2 | WEIGHT: 112.1 LBS

## 2022-04-22 LAB
ANION GAP SERPL CALCULATED.3IONS-SCNC: 8 MMOL/L (ref 5–18)
BASOPHILS # BLD AUTO: 0 10E3/UL (ref 0–0.2)
BASOPHILS NFR BLD AUTO: 0 %
BUN SERPL-MCNC: 20 MG/DL (ref 8–28)
C REACTIVE PROTEIN LHE: 5 MG/DL (ref 0–0.8)
CALCIUM SERPL-MCNC: 7.6 MG/DL (ref 8.5–10.5)
CHLORIDE BLD-SCNC: 108 MMOL/L (ref 98–107)
CO2 SERPL-SCNC: 29 MMOL/L (ref 22–31)
CREAT SERPL-MCNC: 0.86 MG/DL (ref 0.6–1.1)
EOSINOPHIL # BLD AUTO: 0.1 10E3/UL (ref 0–0.7)
EOSINOPHIL NFR BLD AUTO: 1 %
ERYTHROCYTE [DISTWIDTH] IN BLOOD BY AUTOMATED COUNT: 15 % (ref 10–15)
GFR SERPL CREATININE-BSD FRML MDRD: 69 ML/MIN/1.73M2
GLUCOSE BLD-MCNC: 76 MG/DL (ref 70–125)
HCT VFR BLD AUTO: 27.1 % (ref 35–47)
HGB BLD-MCNC: 8.3 G/DL (ref 11.7–15.7)
IMM GRANULOCYTES # BLD: 0.1 10E3/UL
IMM GRANULOCYTES NFR BLD: 1 %
LYMPHOCYTES # BLD AUTO: 1.7 10E3/UL (ref 0.8–5.3)
LYMPHOCYTES NFR BLD AUTO: 13 %
MCH RBC QN AUTO: 28.4 PG (ref 26.5–33)
MCHC RBC AUTO-ENTMCNC: 30.6 G/DL (ref 31.5–36.5)
MCV RBC AUTO: 93 FL (ref 78–100)
MONOCYTES # BLD AUTO: 1.4 10E3/UL (ref 0–1.3)
MONOCYTES NFR BLD AUTO: 11 %
NEUTROPHILS # BLD AUTO: 10.1 10E3/UL (ref 1.6–8.3)
NEUTROPHILS NFR BLD AUTO: 74 %
NRBC # BLD AUTO: 0 10E3/UL
NRBC BLD AUTO-RTO: 0 /100
PLATELET # BLD AUTO: 338 10E3/UL (ref 150–450)
POTASSIUM BLD-SCNC: 3.8 MMOL/L (ref 3.5–5)
RBC # BLD AUTO: 2.92 10E6/UL (ref 3.8–5.2)
SARS-COV-2 RNA RESP QL NAA+PROBE: NEGATIVE
SCANNED LAB RESULT: NORMAL
SODIUM SERPL-SCNC: 145 MMOL/L (ref 136–145)
WBC # BLD AUTO: 13.5 10E3/UL (ref 4–11)

## 2022-04-22 PROCEDURE — 250N000013 HC RX MED GY IP 250 OP 250 PS 637: Performed by: INTERNAL MEDICINE

## 2022-04-22 PROCEDURE — 87385 HISTOPLASMA CAPSUL AG IA: CPT | Performed by: INTERNAL MEDICINE

## 2022-04-22 PROCEDURE — 250N000009 HC RX 250: Performed by: STUDENT IN AN ORGANIZED HEALTH CARE EDUCATION/TRAINING PROGRAM

## 2022-04-22 PROCEDURE — 999N000157 HC STATISTIC RCP TIME EA 10 MIN

## 2022-04-22 PROCEDURE — 99238 HOSP IP/OBS DSCHRG MGMT 30/<: CPT | Mod: GC

## 2022-04-22 PROCEDURE — 250N000009 HC RX 250: Performed by: FAMILY MEDICINE

## 2022-04-22 PROCEDURE — 250N000013 HC RX MED GY IP 250 OP 250 PS 637: Performed by: FAMILY MEDICINE

## 2022-04-22 PROCEDURE — 250N000013 HC RX MED GY IP 250 OP 250 PS 637

## 2022-04-22 PROCEDURE — 87635 SARS-COV-2 COVID-19 AMP PRB: CPT

## 2022-04-22 PROCEDURE — 94640 AIRWAY INHALATION TREATMENT: CPT

## 2022-04-22 PROCEDURE — 250N000011 HC RX IP 250 OP 636: Performed by: STUDENT IN AN ORGANIZED HEALTH CARE EDUCATION/TRAINING PROGRAM

## 2022-04-22 PROCEDURE — 250N000013 HC RX MED GY IP 250 OP 250 PS 637: Performed by: STUDENT IN AN ORGANIZED HEALTH CARE EDUCATION/TRAINING PROGRAM

## 2022-04-22 PROCEDURE — 80048 BASIC METABOLIC PNL TOTAL CA: CPT | Performed by: STUDENT IN AN ORGANIZED HEALTH CARE EDUCATION/TRAINING PROGRAM

## 2022-04-22 PROCEDURE — 250N000012 HC RX MED GY IP 250 OP 636 PS 637

## 2022-04-22 PROCEDURE — 86140 C-REACTIVE PROTEIN: CPT | Performed by: STUDENT IN AN ORGANIZED HEALTH CARE EDUCATION/TRAINING PROGRAM

## 2022-04-22 PROCEDURE — 85025 COMPLETE CBC W/AUTO DIFF WBC: CPT | Performed by: INTERNAL MEDICINE

## 2022-04-22 RX ORDER — METOPROLOL SUCCINATE 25 MG/1
75 TABLET, EXTENDED RELEASE ORAL AT BEDTIME
Qty: 90 TABLET | Refills: 0
Start: 2022-04-22 | End: 2022-10-11

## 2022-04-22 RX ORDER — FLUTICASONE PROPIONATE 50 MCG
1 SPRAY, SUSPENSION (ML) NASAL DAILY
Qty: 15.8 ML | Refills: 0 | Status: ON HOLD
Start: 2022-04-22 | End: 2023-01-01

## 2022-04-22 RX ORDER — PREDNISONE 5 MG/1
5 TABLET ORAL DAILY
Qty: 3 TABLET | Refills: 0
Start: 2022-04-22 | End: 2022-04-26

## 2022-04-22 RX ORDER — MIRTAZAPINE 15 MG/1
15 TABLET, FILM COATED ORAL AT BEDTIME
Qty: 30 TABLET | Refills: 0
Start: 2022-04-22 | End: 2022-06-02

## 2022-04-22 RX ORDER — ALBUTEROL SULFATE 0.83 MG/ML
2.5 SOLUTION RESPIRATORY (INHALATION)
Qty: 90 ML | Refills: 0
Start: 2022-04-22 | End: 2023-01-01

## 2022-04-22 RX ORDER — VANCOMYCIN HYDROCHLORIDE 125 MG/1
125 CAPSULE ORAL 4 TIMES DAILY
Qty: 56 CAPSULE | Refills: 0
Start: 2022-04-22 | End: 2022-05-19

## 2022-04-22 RX ORDER — LACTOBACILLUS RHAMNOSUS GG 10B CELL
1 CAPSULE ORAL 2 TIMES DAILY
Qty: 60 CAPSULE | Refills: 0
Start: 2022-04-22 | End: 2022-06-30

## 2022-04-22 RX ORDER — VIT B COMP NO.3/FOLIC/C/BIOTIN 1 MG-60 MG
1 TABLET ORAL DAILY
Qty: 30 TABLET | Refills: 0
Start: 2022-04-22 | End: 2022-04-25

## 2022-04-22 RX ORDER — VANCOMYCIN HYDROCHLORIDE 125 MG/1
125 CAPSULE ORAL
Qty: 14 CAPSULE | Refills: 0
Start: 2022-05-20 | End: 2022-06-02

## 2022-04-22 RX ORDER — GABAPENTIN 300 MG/1
300 CAPSULE ORAL AT BEDTIME
Qty: 30 CAPSULE | Refills: 0
Start: 2022-04-22 | End: 2022-05-19

## 2022-04-22 RX ORDER — MEROPENEM 1 G/1
1 INJECTION, POWDER, FOR SOLUTION INTRAVENOUS EVERY 12 HOURS
Qty: 42 EACH | Refills: 0 | Status: SHIPPED | OUTPATIENT
Start: 2022-04-22 | End: 2022-05-10

## 2022-04-22 RX ORDER — TRAMADOL HYDROCHLORIDE 50 MG/1
50 TABLET ORAL 2 TIMES DAILY
Qty: 60 TABLET | Refills: 0
Start: 2022-04-22 | End: 2022-04-22

## 2022-04-22 RX ORDER — FUROSEMIDE 20 MG
20 TABLET ORAL DAILY
Qty: 30 TABLET | Refills: 0
Start: 2022-04-22 | End: 2022-05-09

## 2022-04-22 RX ORDER — VANCOMYCIN HYDROCHLORIDE 125 MG/1
125 CAPSULE ORAL 2 TIMES DAILY
Qty: 14 CAPSULE | Refills: 0
Start: 2022-05-05 | End: 2022-06-02

## 2022-04-22 RX ORDER — FAMOTIDINE 20 MG/1
20 TABLET, FILM COATED ORAL AT BEDTIME
Qty: 30 TABLET | Refills: 0 | Status: SHIPPED | OUTPATIENT
Start: 2022-04-22 | End: 2022-07-27

## 2022-04-22 RX ORDER — GUAIFENESIN 600 MG/1
1200 TABLET, EXTENDED RELEASE ORAL 2 TIMES DAILY
Qty: 60 TABLET | Refills: 0
Start: 2022-04-22 | End: 2022-10-11

## 2022-04-22 RX ORDER — GABAPENTIN 100 MG/1
200 CAPSULE ORAL EVERY MORNING
Qty: 60 CAPSULE | Refills: 0
Start: 2022-04-23 | End: 2022-05-19

## 2022-04-22 RX ORDER — VANCOMYCIN HYDROCHLORIDE 125 MG/1
125 CAPSULE ORAL DAILY
Qty: 7 CAPSULE | Refills: 0
Start: 2022-05-13 | End: 2022-05-19

## 2022-04-22 RX ORDER — TRAMADOL HYDROCHLORIDE 50 MG/1
50 TABLET ORAL 2 TIMES DAILY
Qty: 60 TABLET | Refills: 0 | Status: SHIPPED | OUTPATIENT
Start: 2022-04-22 | End: 2022-05-19

## 2022-04-22 RX ORDER — IPRATROPIUM BROMIDE AND ALBUTEROL SULFATE 2.5; .5 MG/3ML; MG/3ML
3 SOLUTION RESPIRATORY (INHALATION) 4 TIMES DAILY
Qty: 90 ML | Refills: 0
Start: 2022-04-22 | End: 2022-06-02

## 2022-04-22 RX ADMIN — Medication 1 CAPSULE: at 09:48

## 2022-04-22 RX ADMIN — B-COMPLEX W/ C & FOLIC ACID TAB 1 MG 1 TABLET: 1 TAB at 09:48

## 2022-04-22 RX ADMIN — IPRATROPIUM BROMIDE AND ALBUTEROL SULFATE 3 ML: 2.5; .5 SOLUTION RESPIRATORY (INHALATION) at 11:28

## 2022-04-22 RX ADMIN — IPRATROPIUM BROMIDE AND ALBUTEROL SULFATE 3 ML: 2.5; .5 SOLUTION RESPIRATORY (INHALATION) at 07:24

## 2022-04-22 RX ADMIN — VANCOMYCIN HYDROCHLORIDE 125 MG: 125 CAPSULE ORAL at 09:49

## 2022-04-22 RX ADMIN — FLUTICASONE PROPIONATE 1 SPRAY: 50 SPRAY, METERED NASAL at 09:51

## 2022-04-22 RX ADMIN — GABAPENTIN 200 MG: 100 CAPSULE ORAL at 06:47

## 2022-04-22 RX ADMIN — FIDAXOMICIN 200 MG: 200 TABLET, FILM COATED ORAL at 09:48

## 2022-04-22 RX ADMIN — MEROPENEM 1 G: 1 INJECTION, POWDER, FOR SOLUTION INTRAVENOUS at 04:16

## 2022-04-22 RX ADMIN — TRAMADOL HYDROCHLORIDE 50 MG: 50 TABLET, COATED ORAL at 09:48

## 2022-04-22 RX ADMIN — SODIUM CHLORIDE SOLN NEBU 3% 3 ML: 3 NEBU SOLN at 07:25

## 2022-04-22 RX ADMIN — GUAIFENESIN 1200 MG: 600 TABLET ORAL at 09:48

## 2022-04-22 RX ADMIN — SODIUM CHLORIDE SOLN NEBU 3% 3 ML: 3 NEBU SOLN at 11:30

## 2022-04-22 RX ADMIN — PREDNISONE 5 MG: 5 TABLET ORAL at 09:49

## 2022-04-22 ASSESSMENT — ACTIVITIES OF DAILY LIVING (ADL)
ADLS_ACUITY_SCORE: 15
ADLS_ACUITY_SCORE: 11
ADLS_ACUITY_SCORE: 11
ADLS_ACUITY_SCORE: 15
ADLS_ACUITY_SCORE: 11
ADLS_ACUITY_SCORE: 15
ADLS_ACUITY_SCORE: 11
ADLS_ACUITY_SCORE: 11

## 2022-04-22 NOTE — PROVIDER NOTIFICATION
04/21/22 2042   Nebulizer Assessment & Treatment   $RT Use ONLY Delivery Method Nebulizer - Additional   Nebulizer Device Mask   Pretreatment Heart Rate (beats/min) 73   Pretreatment Resp Rate (breaths/min) 16   Pretreatment O2 sats - (TCU only) 99   Pretreat Breath Sounds - Bilat - All Lobes clear;diminished   Patient Position HOB elevated   Breath Sounds Post-Respiratory Treatment   Posttreatment Heart Rate (beats/min) 70   Posttreatment Resp Rate (breaths/min) 16   Post treatment O2 Sats - (TCU only) 99   Posttreatment Assessment (SVN) breath sounds unchanged

## 2022-04-22 NOTE — PLAN OF CARE
"Problem: Plan of Care - These are the overarching goals to be used throughout the patient stay.    Goal: Patient-Specific Goal (Individualized)  Description: You can add care plan individualizations to a care plan. Examples of Individualization might be:  \"Parent requests to be called daily at 9am for status\", \"I have a hard time hearing out of my right ear\", or \"Do not touch me to wake me up as it startles me\".  Outcome: Ongoing, Progressing     Problem: COPD (Chronic Obstructive Pulmonary Disease) Comorbidity  Goal: Maintenance of COPD Symptom Control  Outcome: Ongoing, Progressing   Utilizing 2L O2 via NC which is baseline for patient.     Problem: Infection  Goal: Absence of Infection Signs and Symptoms  Outcome: Ongoing, Progressing  Intervention: Prevent or Manage Infection  Recent Flowsheet Documentation  Taken 4/21/2022 1645 by Brittany Molina, RN  Isolation Precautions: enteric precautions maintained  Taken 4/21/2022 0900 by Brittany Molina, RN  Isolation Precautions: enteric precautions maintained   VSS. Enteric precautions maintained.   "

## 2022-04-22 NOTE — PLAN OF CARE
Problem: COPD (Chronic Obstructive Pulmonary Disease) Comorbidity  Goal: Maintenance of COPD Symptom Control  Outcome: Ongoing, Progressing   Goal Outcome Evaluation:      Nebs given 02 on 2l/m nc 96% BBS clear/diminished

## 2022-04-22 NOTE — PROGRESS NOTES
Care Management Discharge Note    Discharge Date: 04/22/2022       Discharge Disposition: Transitional Care    Discharge Services: None    Discharge DME: Oxygen    Discharge Transportation: family or friend will provide    Private pay costs discussed: transportation costs    PAS Confirmation Code:  [id number])  Patient/family educated on Medicare website which has current facility and service quality ratings: yes    Education Provided on the Discharge Plan:  Yes   Persons Notified of Discharge Plans: Yes  Patient/Family in Agreement with the Plan: yes    Handoff Referral Completed: Yes     Additional Information:  Pt is discharging to [facility].   [email or web address] w/c ride set up for 12:00 PM. [facility] spoke with Pt and feels ready to go.  [facility] called and spoke with daughterAugie Mckoy and listened to her as her plate is full.  Pt having repeat COVID test before discharge.       BRADEN Weber

## 2022-04-22 NOTE — PLAN OF CARE
Status [id number]:    Patient is alert and oriented, call light appropriate and within reach. Bed alarm on for safety. Up to bedside commode with assist from one staff, walker and belt. VSS on 2L NC, which is her baseline. Lung sounds diminished. Denies pain. Remains on enteric precautions for +C-Diff. Planning to transition to [facility] today, ride scheduled for 12:00.

## 2022-04-22 NOTE — PLAN OF CARE
Physical Therapy Discharge Summary    Reason for therapy discharge:    Discharged to transitional care facility.    Progress towards therapy goal(s). See goals on Care Plan in [facility] electronic health record for goal details.  Goals not met.  Barriers to achieving goals:   Assistance with ambulation and transfers.    Therapy recommendation(s):    Continued therapy is recommended.  Rationale/Recommendations:  TCU.

## 2022-04-22 NOTE — PLAN OF CARE
Occupational Therapy Discharge Summary    Reason for therapy discharge:    Discharged to transitional care facility.    Progress towards therapy goal(s). See goals on Care Plan in [facility] electronic health record for goal details.  Goals partially met.  Barriers to achieving goals:   discharge from facility.    Therapy recommendation(s):    Continued therapy is recommended.  Rationale/Recommendations:  not at baseline.

## 2022-04-22 NOTE — DISCHARGE SUMMARY
[facility]  Discharge Summary - Medicine & Pediatrics       Date of Admission:  4/7/2022  Date of Discharge:  4/22/2022  Discharging Provider: Irene Mukherjee MD/Irene Lombardo MD PGY-1  Discharge Service: Hospitalist Service    Discharge Diagnoses   Community Acquired Pneumonia   ZONIA pulmonary abscess  C-difficile positive  COPD exacerbation  Anxiety    Follow-ups Needed After Discharge   Follow up with Infectious Disease in 2-3 weeks.  Weekly CMP, CBC, CRP.  Repeat CT Chest in 2 weeks    New Medications this Hospitalization:  Mirtazapine 15 mg at bedtime   Lasix decreased from 40 mg daily to 20 mg daily     Medications held due to risk of delirium, consider resuming on discharge, if patient wishes:  Myrbetriq   Vesicare     Unresulted Labs Ordered in the Past 30 Days of this Admission     Date and Time Order Name Status Description    4/20/2022  3:34 PM Respiratory Aerobic Bacterial Culture Preliminary     4/18/2022  6:17 PM Bronchopulm Aspergillosis Allergic Panel In process     4/18/2022  6:17 PM Aspergillus fumigatus Antibody IgG In process     4/18/2022  6:17 PM Aspergillus antibody In process     4/18/2022  6:17 PM Coccidioides antibody In process     4/18/2022  6:17 PM Histoplasma Galactomannan Antigen Quant by EIA, Urine In process     4/18/2022  6:17 PM Histoplasma Antigen Quantitative by EIA In process     4/18/2022  6:17 PM Histoplasma Moira by Immunodiffusion In process     4/18/2022  6:17 PM Fungal Antibodies In process     4/18/2022  6:17 PM Blastomyces antibody ID In process     4/18/2022 11:07 AM Blood Culture Peripheral Blood Preliminary     4/18/2022 11:07 AM Blood Culture Peripheral Blood Preliminary     4/18/2022 12:01 AM Acid-Fast Bacilli Culture and Stain with AFB Stain In process     4/17/2022 12:01 AM Acid-Fast Bacilli Culture and Stain with AFB Stain In process     4/16/2022 12:01 AM Acid-Fast Bacilli Culture and Stain with AFB Stain In process     4/15/2022  7:24  PM Histoplasma Moira by Immunodiffusion In process     4/15/2022  7:23 PM Nocardia species culture Preliminary       These results will be followed up by  [facility].     Discharge Disposition   Transitional Care- [facility]  Condition at discharge: Stable  Patient ready to discharge to a skilled nursing facility as soon as possible in order to create capacity for patients related to the COVID-19 pandemic.    Hospital Course   Irene León is a 77 year old female admitted on 4/7/2022 for community acquired pneumonia in the setting of severe COPD on baseline 2L O2 by NC. PMHx also includes HFrEF, LBBB, HTN, factor V Leiden, TIA, and atrial fibrillation on Xarelto, CKD stage 3, and 35+ lb unintentional weight loss in the past one year. Hospitalization was initially complicated by resolved delirium, followed by frequent stooling and diagnosis of C difficile diarrhea. After completing a course of antibiotics for pneumonia, she experienced acute worsening of respiratory status and repeat CT chest revealed interim development of a 5cm left upper lobe cavitation. Infectious disease and pulmonology were consulted.  Antibiotics were broadened to zosyn then transitioned to meropenem.  Due to the patient's baseline poor pulmonary status, bronchoscopy was discussed but ultimately decided with her clinical improvement the risk would be great.  The patient declined bronchoscopy as well.    Clinically, following initiation of meropenem for her pulmonary abscess and fidaxomicin for her C. difficile infection, her WBC and CRP continue to improve. She was titrated to her home baseline 2 L oxygen continuously.  She had improvement in her endurance and strength and participation with PT.  Per pulmonology recommendations, outpatient pulmonary rehab would be recommended as well as CT chest in 2 weeks.  Her home inhalers were held, due to 4 times daily duo nebs seeming to be more effective for the patient and her acute  shortness of breath episodes.    Per infectious disease recommendations, she should continue on 3 weeks of IV meropenem, she should have repeat CT of chest in 2 weeks and follow-up appointment with infectious disease in 2 to 3 weeks to discuss imaging and if further antibiotics intravenously will be necessary.  Plan for weekly CBC CMP CRP monitoring.  Per infectious disease, plan for vancomycin tapering course with titration over the next 2 months, and completion of fidaxomicin 10-day course.   Early in her hospitalization she had a brief episode of hospital related delirium.  Medications were adjusted and her Vesicare and Myrbetriq were ultimately held due to the increased risk of delirium associated.  I recommended not resuming these medications, given she is on a diuretic. The family would like to consider resuming at discharge from transitional care facility.  Due to her anxiety in the setting of acute illness, mirtazapine 15 mg nightly was started.  She is tolerating this well, and has seen improvement in sleep and baseline anxiety levels.  After discussions with palliative care and pulmonology, her CODE STATUS was made DNR/DNI.    Consultations This Hospital Stay   VASCULAR ACCESS ADULT IP CONSULT  PHARMACY IP CONSULT  PHARMACY IP CONSULT  IP RESPIRATORY CARE CHRONIC PULMONARY DISEASE SPECIALIST  INFECTIOUS DISEASES IP CONSULT  SOCIAL WORK IP CONSULT  PHYSICAL THERAPY ADULT IP CONSULT  OCCUPATIONAL THERAPY ADULT IP CONSULT  NUTRITION SERVICES ADULT IP CONSULT  PULMONARY IP CONSULT  VASCULAR ACCESS ADULT IP CONSULT  SPIRITUAL HEALTH SERVICES IP CONSULT  PALLIATIVE CARE ADULT IP CONSULT  OCCUPATIONAL THERAPY ADULT IP CONSULT  ACUPUNCTURE IP CONSULT  SOCIAL WORK IP CONSULT  ACUPUNCTURE IP CONSULT  ACUPUNCTURE IP CONSULT  ACUPUNCTURE IP CONSULT  INFECTIOUS DISEASES IP CONSULT  PULMONARY IP CONSULT  PHYSICAL THERAPY ADULT IP CONSULT  OCCUPATIONAL THERAPY ADULT IP CONSULT    Code Status   No CPR- Do NOT Intubate        The patient was discussed with Dr. Irene Mukherjee.     Irene Lombardo MD  [facility] Residency [facility]  [facility] [address] [address]  Phone: [phone]  Fax: [phone]  ______________________________________________________________________    Physical Exam   Vital Signs: Temp: 97.7  F (36.5  C) Temp src: Oral BP: (!) 157/69 (Nurse notified) Pulse: 67   Resp: 18 SpO2: 96 % O2 Device: Nasal cannula Oxygen Delivery: 2 LPM  Weight: 112 lbs 1.6 oz  Constitutional: Awake, alert, cooperative, oriented to person, place, and day. Up in chair, looks comfortable.   Eyes: Lids and lashes normal, extra ocular muscles intact, sclera clear, conjunctiva normal.  ENT: Normocephalic, without obvious abnormality, atraumatic  Lungs:  LS left mid/upper posterior with coarse breath sounds. Right clear, with poor air exchange  Cardiovascular: Regular rate and rhythm, normal S1 and S2  Abdomen: Soft, non tender, no distention or guarding  Neurologic: Awake and alert. Cranial nerves II-XII are grossly intact.  Neuropsychiatric: Normal affect, mood, orientation  Skin: Bruising to BLE      Primary Care Physician   Pankaj Montanez    Discharge Orders      CT Chest w/o Contrast     Follow Up (TCM)    Follow up with Emir Travis MD, Infectious Disease at the [facility] (near [facility]) in 2-3 weeks (or next available). Phone: [phone]     Follow Up (TCM)    Follow up with Emir Travis MD, Infectious Disease at the [facility] (near [facility]) in 2-3 weeks (or next available). Phone: [phone]     Follow Up (TCM)    Follow up: CT Chest needs to be scheduled for 2 weeks.     Appointments on [place] and/or [facility] (with [facility] or [facility] provider or service). Call [phone] if you haven't heard regarding these appointments within 7 days of discharge.     Follow Up (TCM)    Please arrange for patient to get to her outpatient  injection appointment.     Appointments on [place] and/or [facility] (with [facility] or [facility] provider or service). Call [phone] if you haven't heard regarding these appointments within 7 days of discharge.     General info for SNF    Length of Stay Estimate: Short Term Care: Estimated # of Days <30  Condition at Discharge: Improving  Level of care:skilled   Rehabilitation Potential: Fair  Admission H&P remains valid and up-to-date: Yes  Recent Chemotherapy: N/A  Use Nursing Home Standing Orders: Yes     Mantoux instructions    Give two-step Mantoux (PPD) Per Facility Policy Yes     Follow Up and recommended labs and tests    Follow up with [facility] Infectious Disease in 2-3 weeks. CT Chest should be completed prior to Infectious Disease follow up. Weekly labs as scheduled by infectious disease.    Weekly CBC, CMP, CRP, results to Infectious disease     Reason for your hospital stay    Community Acquired Pneumonia  Complicated by left upper lobe lung abscess  C-difficile inifection     Wound care (specify)    Site:   Buttock  Instructions:  Apply Calmoseptine as needed     IV access    PICC. Routine PICC cares.     Activity - Up ad ford     No CPR- Do NOT Intubate     Physical Therapy Adult Consult    Evaluate and treat as clinically indicated.    Reason:  Weakness, low endurance     Occupational Therapy Adult Consult    Evaluate and treat as clinically indicated.    Reason:  Weakness, low endurance     Contact Isolation    Enteric for c-diff. Discontinue per facility policy.     Fall precautions     Oxygen Adult/Peds    Oxygen Documentation:   I certify that this patient, Irene León has been under my care (or a nurse practitioner or physican's assistant working with me). This is the face-to-face encounter for oxygen medical necessity.      Irene eLón is now in a chronic stable state and continues to require supplemental oxygen. Patient has continued oxygen desaturation due to COPD  J44.9.    Alternative treatment(s) tried or considered and deemed clinically infective for treatment of COPD J44.9 include nebulizers, inhalers, steroids, pulmonary toileting, and pulmonary rehab.  If portability is ordered, is the patient mobile within the home? yes    **Patients who qualify for home O2 coverage under the CMS guidelines require ABG tests or O2 sat readings obtained closest to, but no earlier than 2 days prior to the discharge, as evidence of the need for home oxygen therapy. Testing must be performed while patient is in the chronic stable state. See notes for O2 sats.**     Diet    Follow this diet upon discharge: Orders Placed This Encounter      Regular Diet Adult       Significant Results and Procedures   Most Recent 3 CBC's:Recent Labs   Lab Test 04/22/22  0646 04/21/22  0608 04/20/22  0620   WBC 13.5* 13.8* 22.1*   HGB 8.3* 8.7* 10.4*   MCV 93 89 90    352 403     Most Recent 3 BMP's:Recent Labs   Lab Test 04/22/22  0646 04/21/22  0608 04/20/22  0620    144 141   POTASSIUM 3.8 3.5 4.2   CHLORIDE 108* 111* 105   CO2 29 26 26   BUN 20 17 19   CR 0.86 0.79 0.92   ANIONGAP 8 7 10   MESSI 7.6* 7.4* 8.4*   GLC 76 75 80     Most Recent 3 BNP's:No lab results found.  Most Recent ESR & CRP:Recent Labs   Lab Test 04/22/22  0646   CRP 5.0*   ,   Results for orders placed or performed during the hospital encounter of 04/07/22   US Lower Extremity Venous Duplex Bilateral    Narrative    EXAM: US LOWER EXTREMITY VENOUS DUPLEX BILATERAL  LOCATION: [facility]  DATE/TIME: 4/7/2022 3:49 PM    INDICATION: hypoxia, hypotension; leg swelling  COMPARISON: None.  TECHNIQUE: Venous Duplex ultrasound of bilateral lower extremities with and without compression, augmentation and duplex. Color flow and spectral Doppler with waveform analysis performed.    FINDINGS: Exam includes the common femoral, femoral, popliteal veins as well as segmentally visualized deep calf veins and greater  saphenous vein.     RIGHT: No deep vein thrombosis. No superficial thrombophlebitis. No popliteal cyst.    LEFT: No deep vein thrombosis. No superficial thrombophlebitis. No popliteal cyst.      Impression    IMPRESSION:  1.  No deep venous thrombosis in the bilateral lower extremities.   XR Chest Port 1 View    Narrative    EXAM: XR CHEST PORT 1 VIEW  LOCATION: [facility]  DATE/TIME: 4/7/2022 4:49 PM    INDICATION: Cough.  COMPARISON: 06/02/2021.      Impression    IMPRESSION:     New moderate opacity in the left mid to upper lung suggestive of pneumonia given history of cough. However, this requires follow-up to complete resolution to exclude an underlying lesion (6-8 week follow-up radiographs).    Right PICC tip near the cavoatrial junction.    Normal heart size. Atherosclerosis.    No significant pleural effusion.       NM Lung Scan Perfusion Particulate    Narrative    EXAM: NM LUNG SCAN PERFUSION PARTICULATE  LOCATION: [facility]  DATE/TIME: 4/7/2022 5:18 PM    INDICATION: PE suspected, high prob  COMPARISON: Chest x-ray 04/07/2022, V/Q scan 06/24/2020, CT chest exam 09/13/2019  TECHNIQUE: 7.9 mCi technetium-99m MAA, IV. Standard lung perfusion imaging.    FINDINGS: Heterogeneous perfusion throughout the left lung with numerous small subsegmental perfusion defects similar to the previous VQ scan. Perfusion to the right lung is more homogeneous, with a few subtle areas of decreased activity also similar to   the prior exam.    Chest x-ray from today demonstrates emphysema with diffuse opacities in the left upper and mid left lung suggestive of pneumonia.      Impression    IMPRESSION:   1.  Chronic perfusion abnormalities throughout both lungs similar to the previous exam. No new perfusion abnormalities. Recent chest x-ray is suggestive of left mid and upper lung pneumonia.   CT Chest Abdomen Pelvis w/o Contrast    Narrative    EXAM: CT CHEST ABDOMEN PELVIS  W/O CONTRAST  LOCATION: [facility]  DATE/TIME: 4/8/2022 2:17 PM    INDICATION: History of breast cancer. Pneumonia. Weight loss.  COMPARISON: CT chest 6/12/2020, CT chest/abdomen/pelvis 6/11/2019  TECHNIQUE: CT scan of the chest, abdomen, and pelvis was performed without IV contrast. Multiplanar reformats were obtained. Dose reduction techniques were used.   CONTRAST: None.    FINDINGS:   LUNGS AND PLEURA: Moderate irregular consolidation in the posterior left upper lobe. Confluent emphysema. Mild bibasilar atelectasis/scar. Trace pleural effusions. Stable mild diffuse bronchial wall thickening. A few stable benign pulmonary nodules.    MEDIASTINUM/AXILLAE: Right PICC. Mediastinal lymphadenopathy with the largest largest being a right paratracheal node measuring 1.6 cm in short axis, image 48:3. Small pericardial effusion. The main pulmonary artery is enlarged at 3.0 cm which may be   seen with pulmonary artery hypertension.    CORONARY ARTERY CALCIFICATION: Moderate.    HEPATOBILIARY: Normal.    PANCREAS: Normal.    SPLEEN: Normal.    ADRENAL GLANDS: Normal.    KIDNEYS/BLADDER: Low-lying right kidney. Subcentimeter renal hypodensities which are too small to characterize. No hydronephrosis.    BOWEL: No obstruction or inflammatory change. Normal appendix.    LYMPH NODES: Normal.    VASCULATURE: Stable ectasia of the abdominal aorta measuring 2.9 x 2.3 cm, image 169:3. Moderate atherosclerosis.    PELVIC ORGANS: Normal.    MUSCULOSKELETAL: Left hip arthroplasty. Osseous demineralization. Degenerative changes of the spine. Remote left rib fractures. T12 compression fracture with 80% loss of height which is likely remote.      Impression    IMPRESSION:  1.  Moderate left upper lobe consolidation. This is consistent with pneumonia. Recommend a follow-up chest radiograph in 4-6 weeks to document resolution.  2.  Mediastinal lymphadenopathy.  3.  No acute abnormality in the abdomen or pelvis.    XR Chest Port 1 View    Narrative    EXAM: XR CHEST PORTABLE 1 VIEW  LOCATION: [facility]  DATE/TIME: 04/10/2022, 7:56 AM    INDICATION: Shortness of breath.  COMPARISON: CT of the chest, abdomen, and pelvis performed 04/08/2022. Chest radiograph performed 04/07/2022.      Impression    IMPRESSION: Ill-defined consolidation and infiltrate in the left upper lobe is again noted. Mild hyperinflation both lungs. The right lung is otherwise clear. Tortuous calcified thoracic aorta. Heart size has increased in prominence compared to   04/07/2022. Pulmonary vascularity is within normal limits where seen. Old right rib fractures. Right PICC line has been removed.     XR Chest Port 1 View    Narrative    EXAM: XR CHEST PORT 1 VIEW  LOCATION: [facility]  DATE/TIME: 4/14/2022 10:22 AM    INDICATION: Worsening shortness of breath, coarse breath sounds.  COMPARISON: 04/10/2022. Others.      Impression    IMPRESSION:     Persistent dense left upper lobe consolidation. The surrounding opacities are stable to perhaps marginally improved, though the dense consolidative abnormality is without substantial change. Increased lucency superior to the consolidation could be from   aerated lung or developing cavitation. Recommend continued follow-up.    Small left pleural effusion. Right lung is grossly clear.    Stable cardiomediastinal silhouette. Atherosclerosis.    Right PICC tip near the distal SVC.      NOTE: ABNORMAL REPORT    THE DICTATION ABOVE DESCRIBES AN ABNORMALITY FOR WHICH FOLLOW-UP IS NEEDED.    CT Chest w/o Contrast    Narrative    EXAM: CT CHEST W/O CONTRAST  LOCATION: [facility]  DATE/TIME: 4/15/2022 1:21 PM    INDICATION: Pneumonia, effusion or abscess suspected, xray done  COMPARISON: CT 4/8/2022  TECHNIQUE: CT chest without IV contrast. Multiplanar reformats were obtained. Dose reduction techniques were used.  CONTRAST: None.    FINDINGS:    LUNGS AND PLEURA: There is now prominent cavity within the consolidated focus left upper lobe. Cavity measures approximately 5 cm with surrounding thick wall and lung consolidation surrounding fat. Mild increase in overall size of left upper lobe   involvement.    Tiny left pleural effusion now present. Minimal linear density at lung bases. Slight mucus plugging seen posterior right lower lobe. Prominent diffuse changes of emphysema.    MEDIASTINUM/AXILLAE: Small reactive lymph nodes are stable. Heavy atherosclerotic calcifications of aorta.    CORONARY ARTERY CALCIFICATION: Moderate.    UPPER ABDOMEN: Diffuse atherosclerotic calcifications.    MUSCULOSKELETAL: Scoliosis and severe compression of T12 unchanged      Impression    IMPRESSION:   1.  There is new cavity formation involving the left upper lobe pneumonia with a 5 cm cavity now present within the densely consolidated lung.  2.  Diffuse emphysema unchanged.     Echocardiogram Complete    Narrative    [id number]  [id number]  [id number]  [id number]^TEREZA^GERALD^[place] [address]     Name: JUDY ZARCO  MRN: [id number]  : 1945  Study Date: 2022 02:58 PM  Age: 77 yrs  Gender: Female  Patient Location: [facility]  Reason For Study: Syncope  Ordering Physician: GERALD STARKS  Performed By: SUGEY     BSA: 1.4 m2  Height: 59 in  Weight: 112 lb  ______________________________________________________________________________  Procedure  Complete Portable Echo Adult.  ______________________________________________________________________________  Interpretation Summary     1. The left ventricle is normal in size. Left ventricular systolic performance  is moderately reduced. The ejection fraction is estimated to be 35-40%.  2. There is moderate global reduction in left ventricular systolic  performance.  3. There is abnormal septal motion possibly related to altered electrical  activation due to bundle branch  block.  4. There is mild-moderate, to perhaps moderate, tricuspid insufficiency.  5. Normal right ventricular size with mildly reduced right ventricular  systolic performance.  6. There is mild right atrial enlargement.  7. Right ventricular systolic pressure relative to right atrial pressure is  mildly increased. The pulmonary artery pressure is estimated to be 45-50 mmHg  plus right atrial pressure (the IVC is mildly dilated).     When compared to the prior real-time echocardiogram dated 28 July 2021, there  has been a mild further diminution in left ventricular systolic performance  (it is this reader's impression that left ventricular systolic performance was  mild-moderately reduced with an ejection fraction 40-45% on the prior  examination).  ______________________________________________________________________________  Left ventricle:  The left ventricle is normal in size. Left ventricular systolic performance is  moderately reduced. The ejection fraction is estimated to be 35-40%. There is  moderate global reduction in left ventricular systolic performance. There is  abnormal septal motion possibly related to altered electrical activation due  to bundle branch block. Left ventricular wall thickness is normal. Incidental  note is made of a pseudotendon/false chord in the LV cavity.     Assessment of left atrial pressure (LAP): The cumulative findings are  indeterminate in evaluating left atrial pressure.     Right ventricle:  Normal right ventricular size with mildly reduced right ventricular systolic  performance.     Left atrium:  There is borderline left atrial enlargement.     Right atrium:  There is mild right atrial enlargement.     IVC:  The IVC is mildly dilated.     Aortic valve:  The aortic valve is comprised of three cusps.     Mitral valve:  There is mild mitral annular calcification. There is trace mitral  insufficiency.     Tricuspid valve:  The tricuspid valve is grossly morphologically  normal. There is mild-moderate,  to perhaps moderate, tricuspid insufficiency.     Pulmonic valve:  The pulmonic valve is grossly morphologically normal.     Thoracic aorta:  The aortic root and proximal ascending aorta are of normal dimension.     Pericardium:  There is no significant pericardial effusion.  ______________________________________________________________________________  ______________________________________________________________________________  MMode/2D Measurements & Calculations  RVDd: 3.5 cm  IVSd: 0.98 cm  LVIDd: 4.1 cm  LVIDs: 3.3 cm  LVPWd: 0.90 cm  FS: 19.4 %  LV mass(C)d: 122.9 grams  LV mass(C)dI: 85.3 grams/m2  Ao root diam: 2.8 cm  LA dimension: 3.5 cm  asc Aorta Diam: 2.8 cm  LA/Ao: 1.2  LVOT diam: 1.8 cm  LVOT area: 2.6 cm2     LA Volume Indexed (AL/bp): 28.8 ml/m2  RWT: 0.44     Time Measurements  MM HR: 98.0 BPM     Doppler Measurements & Calculations  MV E max carolyn: 89.4 cm/sec  MV A max carolyn: 135.9 cm/sec  MV E/A: 0.66  MV dec time: 0.12 sec  LV V1 max PG: 3.2 mmHg  LV V1 max: 89.2 cm/sec  LV V1 VTI: 18.3 cm  SV(LVOT): 48.2 ml  SI(LVOT): 33.4 ml/m2  TR max carolyn: 348.4 cm/sec  TR max P.6 mmHg  E/E' av.9  Lateral E/e': 11.0  Medial E/e': 16.8     ______________________________________________________________________________  Report approved by: Deepak Anderson 2022 04:24 PM               Discharge Medications   Current Discharge Medication List      START taking these medications    Details   albuterol (PROVENTIL) (2.5 MG/3ML) 0.083% neb solution Take 1 vial (2.5 mg) by nebulization every 2 hours as needed for shortness of breath / dyspnea  Qty: 90 mL, Refills: 0    Associated Diagnoses: COPD exacerbation (H); Pulmonary emphysema, unspecified emphysema type (H)      fidaxomicin (DIFICID) 200 MG tablet Take 1 tablet (200 mg) by mouth 2 times daily for 6 days  Qty: 12 tablet, Refills: 0    Associated Diagnoses: C. difficile colitis      guaiFENesin (MUCINEX) 600  MG 12 hr tablet Take 2 tablets (1,200 mg) by mouth 2 times daily  Qty: 60 tablet, Refills: 0    Associated Diagnoses: COPD exacerbation (H); Pulmonary emphysema, unspecified emphysema type (H)      guaiFENesin (ROBITUSSIN) 20 mg/mL SOLN solution Take 10 mLs by mouth every 4 hours as needed for cough  Qty: 118 mL, Refills: 0    Associated Diagnoses: COPD exacerbation (H); Pulmonary emphysema, unspecified emphysema type (H)      lactobacillus rhamnosus, GG, (CULTURELL) capsule Take 1 capsule by mouth 2 times daily  Qty: 60 capsule, Refills: 0    Associated Diagnoses: COPD exacerbation (H); Pulmonary emphysema, unspecified emphysema type (H)      melatonin 5 MG tablet Take 1 tablet (5 mg) by mouth At Bedtime  Qty: 30 tablet, Refills: 0    Associated Diagnoses: COPD exacerbation (H); Pulmonary emphysema, unspecified emphysema type (H)      menthol-zinc oxide (CALMOSEPTINE) 0.44-20.6 % OINT ointment Apply topically 4 times daily as needed for skin protection  Qty: 113 g, Refills: 0    Associated Diagnoses: COPD exacerbation (H); Pulmonary emphysema, unspecified emphysema type (H)      meropenem (MERREM) 1 g vial Inject 1,000 mg (1 g) into the vein every 12 hours for 21 days  Qty: 42 each, Refills: 0    Associated Diagnoses: Abscess of upper lobe of left lung with pneumonia (H)      mirtazapine (REMERON) 15 MG tablet Take 1 tablet (15 mg) by mouth At Bedtime  Qty: 30 tablet, Refills: 0    Associated Diagnoses: COPD exacerbation (H); Pulmonary emphysema, unspecified emphysema type (H)      multivitamin RENAL (RENAVITE RX/NEPHROVITE) 1 MG tablet Take 1 tablet by mouth daily  Qty: 30 tablet, Refills: 0    Associated Diagnoses: COPD exacerbation (H); Pulmonary emphysema, unspecified emphysema type (H)      predniSONE (DELTASONE) 5 MG tablet Take 1 tablet (5 mg) by mouth daily for 3 days  Qty: 3 tablet, Refills: 0    Associated Diagnoses: COPD exacerbation (H); Pulmonary emphysema, unspecified emphysema type (H)      !!  vancomycin (VANCOCIN) 125 MG capsule Take 1 capsule (125 mg) by mouth 4 times daily  Qty: 56 capsule, Refills: 0    Associated Diagnoses: C. difficile colitis      !! vancomycin (VANCOCIN) 125 MG capsule Take 1 capsule (125 mg) by mouth 2 times daily  Qty: 14 capsule, Refills: 0    Associated Diagnoses: C. difficile colitis      !! vancomycin (VANCOCIN) 125 MG capsule Take 1 capsule (125 mg) by mouth daily  Qty: 7 capsule, Refills: 0    Associated Diagnoses: C. difficile colitis      !! vancomycin (VANCOCIN) 125 MG capsule Take 1 capsule (125 mg) by mouth every 48 hours  Qty: 14 capsule, Refills: 0    Associated Diagnoses: C. difficile colitis       !! - Potential duplicate medications found. Please discuss with provider.      CONTINUE these medications which have CHANGED    Details   famotidine (PEPCID) 20 MG tablet Take 1 tablet (20 mg) by mouth At Bedtime  Qty: 30 tablet, Refills: 0    Associated Diagnoses: COPD exacerbation (H); Pulmonary emphysema, unspecified emphysema type (H)      fluticasone (FLONASE) 50 MCG/ACT nasal spray Spray 1 spray into both nostrils daily  Qty: 15.8 mL, Refills: 0    Associated Diagnoses: COPD exacerbation (H); Pulmonary emphysema, unspecified emphysema type (H)      furosemide (LASIX) 20 MG tablet Take 1 tablet (20 mg) by mouth daily  Qty: 30 tablet, Refills: 0    Associated Diagnoses: COPD exacerbation (H); Pulmonary emphysema, unspecified emphysema type (H); Acute on chronic diastolic congestive heart failure (H)      !! gabapentin (NEURONTIN) 100 MG capsule Take 2 capsules (200 mg) by mouth every morning  Qty: 60 capsule, Refills: 0    Associated Diagnoses: COPD exacerbation (H); Pulmonary emphysema, unspecified emphysema type (H)      !! gabapentin (NEURONTIN) 300 MG capsule Take 1 capsule (300 mg) by mouth At Bedtime  Qty: 30 capsule, Refills: 0    Associated Diagnoses: COPD exacerbation (H); Pulmonary emphysema, unspecified emphysema type (H)      ipratropium - albuterol 0.5  mg/2.5 mg/3 mL (DUONEB) 0.5-2.5 (3) MG/3ML neb solution Take 1 vial (3 mLs) by nebulization 4 times daily  Qty: 90 mL, Refills: 0    Associated Diagnoses: COPD exacerbation (H); Pulmonary emphysema, unspecified emphysema type (H)      metoprolol succinate ER (TOPROL-XL) 25 MG 24 hr tablet Take 3 tablets (75 mg) by mouth At Bedtime  Qty: 90 tablet, Refills: 0    Associated Diagnoses: COPD exacerbation (H); Pulmonary emphysema, unspecified emphysema type (H); Acute on chronic diastolic congestive heart failure (H)      traMADol (ULTRAM) 50 MG tablet Take 1 tablet (50 mg) by mouth 2 times daily  Qty: 60 tablet, Refills: 0    Associated Diagnoses: COPD exacerbation (H); Pulmonary emphysema, unspecified emphysema type (H)       !! - Potential duplicate medications found. Please discuss with provider.      CONTINUE these medications which have NOT CHANGED    Details   acetaminophen (TYLENOL) 500 MG tablet Take 1,000 mg by mouth every 8 hours as needed for mild pain or fever       ascorbic acid, vitamin C, (VITAMIN C) 1000 MG tablet [ASCORBIC ACID, VITAMIN C, (VITAMIN C) 1000 MG TABLET] Take 1,000 mg by mouth daily. Airborne 1 tab daily      cetirizine (ZYRTEC) 10 MG tablet Take 10 mg by mouth daily as needed for allergies       chlorhexidine (PERIDEX) 0.12 % solution [CHLORHEXIDINE (PERIDEX) 0.12 % SOLUTION] Apply 15 mL to the mouth or throat at bedtime as needed.       cholecalciferol, vitamin D3, 1,000 unit tablet [CHOLECALCIFEROL, VITAMIN D3, 1,000 UNIT TABLET] Take 1,000 Units by mouth daily.      EPINEPHrine (EPIPEN/ADRENACLICK/AUVI-Q) 0.3 mg/0.3 mL injection Inject 0.3 mg into the muscle as needed for anaphylaxis     Comments: Please dispense product with lowest cost to patient      Fluticasone-Umeclidin-Vilanterol (TRELEGY ELLIPTA) 200-62.5-25 MCG/INH oral inhaler Inhale 1 puff into the lungs At Bedtime      ipratropium (ATROVENT HFA) 17 MCG/ACT inhaler [ATROVENT HFA 17 MCG/ACTUATION INHALER] USE 2 INHALATIONS  EVERY 6 HOURS  Qty: 77.4 g, Refills: 3    Comments: Patient would like a total of 9 inhalers for a 90 day supply.  Associated Diagnoses: Chronic obstructive pulmonary disease, unspecified COPD type (H)      potassium chloride ER (K-TAB/KLOR-CON) 10 MEQ CR tablet Take 1 tablet (10 mEq) by mouth 2 times daily  Qty: 180 tablet, Refills: 3    Associated Diagnoses: Hypokalemia      rivaroxaban ANTICOAGULANT (XARELTO) 15 MG TABS tablet Take 1 tablet (15 mg) by mouth At Bedtime  Qty: 90 tablet, Refills: 3    Associated Diagnoses: History of stroke      simvastatin (ZOCOR) 40 MG tablet Take 1 tablet (40 mg) by mouth At Bedtime  Qty: 90 tablet, Refills: 3    Associated Diagnoses: Hypercholesterolemia      tamoxifen (NOLVADEX) 20 MG tablet TAKE 1 TABLET DAILY  Qty: 90 tablet, Refills: 3    Comments: YOUR PATIENT HAS REQUESTED A REFILL OF THIS MEDICATION, PREVIOUSLY AUTHORIZED BY ANOTHER PRESCRIBER.  Associated Diagnoses: Malignant neoplasm of central portion of left breast in female, estrogen receptor positive (H)         STOP taking these medications       albuterol (PROAIR HFA/PROVENTIL HFA/VENTOLIN HFA) 108 (90 Base) MCG/ACT inhaler Comments:   Reason for Stopping:         benzonatate (TESSALON) 100 MG capsule Comments:   Reason for Stopping:         loperamide (IMODIUM) 2 mg capsule Comments:   Reason for Stopping:         magnesium chloride (SLOW-MAG) 64 mg TbEC delayed-release tablet Comments:   Reason for Stopping:         mirabegron (MYRBETRIQ) 50 MG 24 hr tablet Comments:   Reason for Stopping:         solifenacin (VESICARE) 5 MG tablet Comments:   Reason for Stopping:             Allergies   Allergies   Allergen Reactions     Sulfa (Sulfonamide Antibiotics) [Sulfa Drugs] Rash     Headaches and dizziness.     Homeopathic Drugs [External Allergen Needs Reconciliation - See Comment] Unknown and Other (See Comments)     Comment: runny nose, Comment: runny nose     Mold Extracts [Molds & Smuts] Unknown     Morphine (Pf)  [Morphine] Unknown     hallucinate     Lisinopril Cough, Unknown and Itching     Comment: cough, Comment: cough     Sulfacetamide Sodium [Sulfacetamide] Rash

## 2022-04-22 NOTE — PLAN OF CARE
Goal Outcome Evaluation:      Note from 0700 to discharge. Discharge paperwork sent with pt. AVS sent with pt. No issues noted. VSS. Belongings sent with pt. Pt transported to [facility] by [facility] transport via wheelchair with O2.

## 2022-04-23 LAB
ASPERGILLUS AB TITR SER CF: NORMAL {TITER}
B DERMAT AB SER-ACNC: 0.3 IV
BACTERIA BLD CULT: NO GROWTH
BACTERIA BLD CULT: NO GROWTH
BACTERIA SPT CULT: ABNORMAL
BACTERIA SPT CULT: ABNORMAL
COCCIDIOIDES AB TITR SER CF: NORMAL {TITER}
GRAM STAIN RESULT: ABNORMAL
H CAPSUL AG SER IA-ACNC: NOT DETECTED
H CAPSUL AG SER QL IA: NOT DETECTED
H CAPSUL MYC AB TITR SER CF: NORMAL {TITER}
H CAPSUL YST AB TITR SER CF: NORMAL {TITER}

## 2022-04-24 LAB
ASPERGILLUS AB SER QL ID: NORMAL
B DERMAT AB SER QL ID: NORMAL
H CAPSUL AG UR QL IA: NOT DETECTED
H CAPSUL AG UR-MCNC: NOT DETECTED NG/ML

## 2022-04-25 ENCOUNTER — TRANSITIONAL CARE UNIT VISIT (OUTPATIENT)
Dept: GERIATRICS | Facility: CLINIC | Age: 77
End: 2022-04-25

## 2022-04-25 ENCOUNTER — LAB REQUISITION (OUTPATIENT)
Dept: LAB | Facility: CLINIC | Age: 77
End: 2022-04-25
Payer: MEDICARE

## 2022-04-25 ENCOUNTER — TELEPHONE (OUTPATIENT)
Dept: INFECTIOUS DISEASES | Facility: CLINIC | Age: 77
End: 2022-04-25

## 2022-04-25 ENCOUNTER — PATIENT OUTREACH (OUTPATIENT)
Dept: CARE COORDINATION | Facility: CLINIC | Age: 77
End: 2022-04-25

## 2022-04-25 VITALS
BODY MASS INDEX: 23.18 KG/M2 | RESPIRATION RATE: 28 BRPM | DIASTOLIC BLOOD PRESSURE: 84 MMHG | HEIGHT: 59 IN | HEART RATE: 87 BPM | SYSTOLIC BLOOD PRESSURE: 195 MMHG | TEMPERATURE: 98.4 F | WEIGHT: 115 LBS | OXYGEN SATURATION: 92 %

## 2022-04-25 DIAGNOSIS — J18.9 PNEUMONIA, UNSPECIFIED ORGANISM: ICD-10-CM

## 2022-04-25 DIAGNOSIS — I10 ESSENTIAL HYPERTENSION: ICD-10-CM

## 2022-04-25 DIAGNOSIS — I50.33 ACUTE ON CHRONIC DIASTOLIC CONGESTIVE HEART FAILURE (H): ICD-10-CM

## 2022-04-25 DIAGNOSIS — R53.81 PHYSICAL DECONDITIONING: ICD-10-CM

## 2022-04-25 DIAGNOSIS — J43.9 PULMONARY EMPHYSEMA, UNSPECIFIED EMPHYSEMA TYPE (H): Primary | ICD-10-CM

## 2022-04-25 PROCEDURE — 99310 SBSQ NF CARE HIGH MDM 45: CPT | Performed by: NURSE PRACTITIONER

## 2022-04-25 RX ORDER — SODIUM CHLORIDE 9 MG/ML
INJECTION, SOLUTION INTRAVENOUS CONTINUOUS
COMMUNITY
End: 2022-06-02

## 2022-04-25 NOTE — PROGRESS NOTES
[facility] GERIATRIC SERVICES       Patient Irene León  MRN: [id number]        Reason for Visit     Chief Complaint   Patient presents with     RECHECK     INITIAL       Code Status     DNR only    Assessment     Pneumonia  ZONIA Abscess/ cavitory pneumonia  COPD  Hypoxic respiratory failure on 2 L of oxygen  Acute metabolic encephalopathy/delirium  C. difficile colitis  Severe anxiety  Wt loss > 40lb  Generalized weakness    Plan     Pt is admitted to TCU for strengthening and rehab.  Patient initially admitted with pneumonia in the setting of COPD and hypoxic respiratory failure.  Unfortunately had a lengthy and prolonged course requiring pulmonary and infectious disease involvement.  Family declined bronchoscopy.  She was noted to have initially pneumonia but course complicated by development of left upper lobe cavity/abscess.  She has been discharged on IV meropenem to finish a total of 3 weeks of IV antibiotics  She will need weekly labs for monitoring.  R/c labs reviewed and show improvement  CRP 2.7  Wbc 11.2  Also because of C. difficile infection she is on a vancomycin titer over the next 2 months  Given oral fidaoxomicin for a 10-day course  Monitor for ongoing colitis and diarrhea.improvement noted  Course complicated also by encephalopathy.  Vesicare and Myrbetriq were held in the hospital.  Significant improvement noted and she is more alert  R/c SLUMS 27/30 in tcu  She was noted to have severe anxiety due to which Remeron has been started at 15 mg at bedtime.  She reports an improvement in apetite from med  Has lymphedema which is chronic   Cont her low dose lasix  Palliative care was involved in goals of care discussion done patient has been made a DNR/DNI in the hospital  Discharge to the TCU for strengthening and rehab  Recheck labs  Continue with PT/OT-reports marked fatigue and limited endurance    History     Patient is a very pleasant 77 year old female who is admitted to TCU  Patient was  admitted in the hospital with underlying history of COPD with pneumonia.  Her hospitalization was complicated by development of worsening respiratory status.  Repeat CT shows development of left upper lobe cavitation consideration was given for an abscess and pulmonary and infectious disease were involved in her care.  She was given broad-spectrum antibiotics  Patient declined bronchoscopy  Course complicated by C. difficile infection for which she was also treated  At baseline has hypoxic respiratory failure and continues with 2 L of oxygen  She also has COPD and will continue with the nebulizer treatments for that she continues to have shortness of breath  Patient also developed confusion with delirium in the hospital.  Medication adjustment done Vesicare and Myrbetriq were held      Past Medical & Surgical History     PAST MEDICAL HISTORY:   Past Medical History:   Diagnosis Date     ANATOLIY (acute kidney injury) (H)      Allergic rhinitis      Arrhythmia      Atrial fibrillation with RVR (H)      Bacteremia      Breast cancer (H) 2017     Cardiomyopathy (H)      Centrilobular emphysema (H)      CHF (congestive heart failure) (H)      CKD (chronic kidney disease)      COPD (chronic obstructive pulmonary disease) (H)      Coronary artery disease      Depression      Dysphagia      E. coli sepsis (H)      Factor 5 Leiden mutation, heterozygous (H)      GERD (gastroesophageal reflux disease)      Hx of radiation therapy 2017     Hyperlipidemia      Hypertension      Hypokalemia      Hypomagnesemia      Idiopathic cardiomyopathy (H)      Left hip pain 4/30/2014     OAB (overactive bladder)      Osteoporosis      Sacral insufficiency fracture      Sinusitis      Syncope      Urge incontinence      Vocal cord dysfunction       PAST SURGICAL HISTORY:   has a past surgical history that includes IR Abdominal Aortogram (4/16/2003); IR Miscellaneous Procedure (4/16/2003); IR Aortic Arch 4 Vessel Angiogram (4/16/2003);  Arthroplasty revision hip (Left); OPEN TX FEMORAL FRACTURE DISTAL MED/LAT CONDYLE (Left, 10/28/2015); Cardiac catheterization; Cataract Extraction (Left, 07/18/2017); Biopsy breast (Right, 2017); Bladder surgery; Lumpectomy breast (Left, 06/2017); PICC/Midline Placement (4/7/2022); and PICC/Midline Placement (4/11/2022).      Past Social History     Reviewed,  reports that she quit smoking about 14 years ago. She quit smokeless tobacco use about 17 years ago. She reports that she does not drink alcohol and does not use drugs.    Family History     Reviewed, and family history includes Breast Cancer in her maternal aunt; Osteoporosis in an other family member; Prostate Cancer in her brother and maternal uncle.    Medication List   Post Discharge Medication Reconciliation Status: Post Discharge Medication Reconciliation Status: discharge medications reconciled, continue medications without change.  Current Outpatient Medications   Medication     acetaminophen (TYLENOL) 500 MG tablet     albuterol (PROVENTIL) (2.5 MG/3ML) 0.083% neb solution     ascorbic acid, vitamin C, (VITAMIN C) 1000 MG tablet     cetirizine (ZYRTEC) 10 MG tablet     chlorhexidine (PERIDEX) 0.12 % solution     cholecalciferol, vitamin D3, 1,000 unit tablet     EPINEPHrine (EPIPEN/ADRENACLICK/AUVI-Q) 0.3 mg/0.3 mL injection     famotidine (PEPCID) 20 MG tablet     fidaxomicin (DIFICID) 200 MG tablet     fluticasone (FLONASE) 50 MCG/ACT nasal spray     Fluticasone-Umeclidin-Vilanterol (TRELEGY ELLIPTA) 200-62.5-25 MCG/INH oral inhaler     furosemide (LASIX) 20 MG tablet     gabapentin (NEURONTIN) 100 MG capsule     gabapentin (NEURONTIN) 300 MG capsule     guaiFENesin (MUCINEX) 600 MG 12 hr tablet     guaiFENesin (ROBITUSSIN) 20 mg/mL SOLN solution     ipratropium (ATROVENT HFA) 17 MCG/ACT inhaler     ipratropium - albuterol 0.5 mg/2.5 mg/3 mL (DUONEB) 0.5-2.5 (3) MG/3ML neb solution     lactobacillus rhamnosus, GG, (CULTURELL) capsule     melatonin  5 MG tablet     menthol-zinc oxide (CALMOSEPTINE) 0.44-20.6 % OINT ointment     meropenem (MERREM) 1 g vial     metoprolol succinate ER (TOPROL-XL) 25 MG 24 hr tablet     mirtazapine (REMERON) 15 MG tablet     potassium chloride ER (K-TAB/KLOR-CON) 10 MEQ CR tablet     rivaroxaban ANTICOAGULANT (XARELTO) 15 MG TABS tablet     simvastatin (ZOCOR) 40 MG tablet     SODIUM CHLORIDE 0.9 % IV SOLN     tamoxifen (NOLVADEX) 20 MG tablet     traMADol (ULTRAM) 50 MG tablet     vancomycin (VANCOCIN) 125 MG capsule     [START ON 5/5/2022] vancomycin (VANCOCIN) 125 MG capsule     [START ON 5/13/2022] vancomycin (VANCOCIN) 125 MG capsule     [START ON 5/20/2022] vancomycin (VANCOCIN) 125 MG capsule     No current facility-administered medications for this visit.          Allergies     Allergies   Allergen Reactions     Sulfa (Sulfonamide Antibiotics) [Sulfa Drugs] Rash     Headaches and dizziness.     Homeopathic Drugs [External Allergen Needs Reconciliation - See Comment] Unknown and Other (See Comments)     Comment: runny nose, Comment: runny nose     Mold Extracts [Molds & Smuts] Unknown     Morphine (Pf) [Morphine] Unknown     hallucinate     Lisinopril Cough, Unknown and Itching     Comment: cough, Comment: cough     Sulfacetamide Sodium [Sulfacetamide] Rash       Review of Systems   A comprehensive review of 14 systems was done. Pertinent findings noted here and in history of present illness. All the rest negative.  Constitutional: Negative.  Negative for fever, chills, she has  activity change, appetite change and fatigue.   But she had profound anorexia.  She reports a weight loss of more than 40 pounds in the last few weeks.  She does not feel like eating and continues to lose weight but now she states that she has gained a few pounds  HENT: Negative for congestion and facial swelling.    Eyes: Negative for photophobia, redness and visual disturbance.   Respiratory: Negative for  chest tightness.  Has cough with some  "expectoration  Cardiovascular: Negative for chest pain, palpitations and has chronic leg swelling.   Gastrointestinal: Negative for nausea, diarrhea, constipation, blood in stool and abdominal distention.   Genitourinary: Negative.    Musculoskeletal: Has chronic back issues due to compression fractures in the past  Skin: Negative.    Neurological: Negative for dizziness, tremors, syncope, weakness, light-headedness and headaches.   Endurance is limited and cannot stand for more than 10 minutes  Hematological: Does not bruise/bleed easily.   Psychiatric/Behavioral: Negative.  Mood/anxiety is better      Physical Exam   /64   Pulse 97   Temp 96.8  F (36  C)   Resp 20   Ht 1.499 m (4' 11\")   Wt 51.3 kg (113 lb)   SpO2 (!) 88%   BMI 22.82 kg/m     On 3 L oxygen  Constitutional: Oriented to person, place, and time and appears well-developed.   HEENT:  Normocephalic and atraumatic.  Eyes: Conjunctivae and EOM are normal. Pupils are equal, round, and reactive to light. No discharge.  No scleral icterus. Nose normal. Mouth/Throat: Oropharynx is clear and moist. No oropharyngeal exudate.    NECK: Normal range of motion. Neck supple. No JVD present. No tracheal deviation present. No thyromegaly present.   CARDIOVASCULAR: Normal rate, regular rhythm and intact distal pulses.  Exam reveals no gallop and no friction rub.  Systolic murmur present.  PULMONARY: Effort normal and breath sounds normal. No respiratory distress.No Wheezing or rales.  ABDOMEN: Soft. Bowel sounds are normal. No distension and no mass.  There is no tenderness. There is no rebound and no guarding. No HSM.  MUSCULOSKELETAL: Normal range of motion. no tenderness. Mild kyphosis, no tenderness.  LYMPH NODES: Has no cervical, supraclavicular, axillary and groin adenopathy.   NEUROLOGICAL: Alert and oriented to person, place, and time. No cranial nerve deficit.  Normal muscle tone. Coordination normal.   GENITOURINARY: Deferred exam.  SKIN: Skin is " warm and dry. No rash noted. No erythema. No pallor.   Stasis dermatitis of both legs with edema and presence of scaling of skin and hyperpigmentary changes which are chronic  EXTREMITIES: No cyanosis, no clubbing, has 1+ lower extremity edema. No Deformity.  PSYCHIATRIC: Normal mood, affect and behavior.      Lab Results     Recent Results (from the past 240 hour(s))   Respiratory Aerobic Bacterial Culture with Gram Stain    Collection Time: 04/16/22  2:39 PM    Specimen: Expectorate; Sputum   Result Value Ref Range    Culture       >10 Squamous epithelial cells/low power field indicates oral contamination. Please recollect.    Gram Stain Result >10 Squamous epithelial cells/low power field     Gram Stain Result <25 PMNs/low power field     Gram Stain Result 3+ Mixed john    Nocardia species culture    Collection Time: 04/16/22  2:39 PM    Specimen: Expectorate; Sputum   Result Value Ref Range    Culture No growth after 9 days    Acid-Fast Bacilli Culture and Stain with AFB Stain    Collection Time: 04/16/22  2:39 PM    Specimen: Buccal (not smear); Sputum   Result Value Ref Range    Acid Fast Stain No acid fast bacilli seen     Acid Fast Stain No acid fast bacilli seen    Histoplasma Galactomannan Antigen Quant by EIA, Urine    Collection Time: 04/16/22  8:21 PM   Result Value Ref Range    Histoplasma Galactomannan Ag Quant, Urn Not Detected ng/mL    Histoplasma Galactomannan Ag Interp, Urn Not Detected Not Detected   Basic metabolic panel    Collection Time: 04/17/22  4:55 AM   Result Value Ref Range    Sodium 141 136 - 145 mmol/L    Potassium 4.4 3.5 - 5.0 mmol/L    Chloride 104 98 - 107 mmol/L    Carbon Dioxide (CO2) 28 22 - 31 mmol/L    Anion Gap 9 5 - 18 mmol/L    Urea Nitrogen 20 8 - 28 mg/dL    Creatinine 0.94 0.60 - 1.10 mg/dL    Calcium 8.2 (L) 8.5 - 10.5 mg/dL    Glucose 71 70 - 125 mg/dL    GFR Estimate 62 >60 mL/min/1.73m2   CRP inflammation    Collection Time: 04/17/22  4:55 AM   Result Value Ref  Range    CRP 7.0 (H) 0.0 - 0.8 mg/dL   Magnesium    Collection Time: 04/17/22  4:55 AM   Result Value Ref Range    Magnesium 2.1 1.8 - 2.6 mg/dL   Phosphorus    Collection Time: 04/17/22  4:55 AM   Result Value Ref Range    Phosphorus 3.4 2.5 - 4.5 mg/dL   CBC with platelets and differential    Collection Time: 04/17/22  4:55 AM   Result Value Ref Range    WBC Count 23.4 (H) 4.0 - 11.0 10e3/uL    RBC Count 3.97 3.80 - 5.20 10e6/uL    Hemoglobin 11.2 (L) 11.7 - 15.7 g/dL    Hematocrit 34.4 (L) 35.0 - 47.0 %    MCV 87 78 - 100 fL    MCH 28.2 26.5 - 33.0 pg    MCHC 32.6 31.5 - 36.5 g/dL    RDW 14.8 10.0 - 15.0 %    Platelet Count 418 150 - 450 10e3/uL    % Neutrophils 81 %    % Lymphocytes 9 %    % Monocytes 8 %    % Eosinophils 0 %    % Basophils 0 %    % Immature Granulocytes 2 %    NRBCs per 100 WBC 0 <1 /100    Absolute Neutrophils 18.8 (H) 1.6 - 8.3 10e3/uL    Absolute Lymphocytes 2.2 0.8 - 5.3 10e3/uL    Absolute Monocytes 1.9 (H) 0.0 - 1.3 10e3/uL    Absolute Eosinophils 0.1 0.0 - 0.7 10e3/uL    Absolute Basophils 0.0 0.0 - 0.2 10e3/uL    Absolute Immature Granulocytes 0.4 <=0.4 10e3/uL    Absolute NRBCs 0.0 10e3/uL   Lactic Acid STAT    Collection Time: 04/17/22  4:55 AM   Result Value Ref Range    Lactic Acid 1.1 0.7 - 2.0 mmol/L   Acid-Fast Bacilli Culture and Stain with AFB Stain    Collection Time: 04/17/22  5:00 AM    Specimen: Mouth; Sputum   Result Value Ref Range    Acid Fast Stain No acid fast bacilli seen     Acid Fast Stain No acid fast bacilli seen    Basic metabolic panel    Collection Time: 04/18/22  7:41 AM   Result Value Ref Range    Sodium 142 136 - 145 mmol/L    Potassium 3.6 3.5 - 5.0 mmol/L    Chloride 105 98 - 107 mmol/L    Carbon Dioxide (CO2) 28 22 - 31 mmol/L    Anion Gap 9 5 - 18 mmol/L    Urea Nitrogen 20 8 - 28 mg/dL    Creatinine 0.91 0.60 - 1.10 mg/dL    Calcium 8.0 (L) 8.5 - 10.5 mg/dL    Glucose 83 70 - 125 mg/dL    GFR Estimate 65 >60 mL/min/1.73m2   CRP inflammation     Collection Time: 04/18/22  7:41 AM   Result Value Ref Range    CRP 7.0 (H) 0.0 - 0.8 mg/dL   Magnesium    Collection Time: 04/18/22  7:41 AM   Result Value Ref Range    Magnesium 1.8 1.8 - 2.6 mg/dL   Phosphorus    Collection Time: 04/18/22  7:41 AM   Result Value Ref Range    Phosphorus 2.6 2.5 - 4.5 mg/dL   CBC with platelets and differential    Collection Time: 04/18/22  7:41 AM   Result Value Ref Range    WBC Count 31.4 (H) 4.0 - 11.0 10e3/uL    RBC Count 3.65 (L) 3.80 - 5.20 10e6/uL    Hemoglobin 10.2 (L) 11.7 - 15.7 g/dL    Hematocrit 31.4 (L) 35.0 - 47.0 %    MCV 86 78 - 100 fL    MCH 27.9 26.5 - 33.0 pg    MCHC 32.5 31.5 - 36.5 g/dL    RDW 14.8 10.0 - 15.0 %    Platelet Count 414 150 - 450 10e3/uL    % Neutrophils 90 %    % Lymphocytes 3 %    % Monocytes 6 %    % Eosinophils 0 %    % Basophils 0 %    % Immature Granulocytes 1 %    NRBCs per 100 WBC 0 <1 /100    Absolute Neutrophils 28.2 (H) 1.6 - 8.3 10e3/uL    Absolute Lymphocytes 0.9 0.8 - 5.3 10e3/uL    Absolute Monocytes 1.9 (H) 0.0 - 1.3 10e3/uL    Absolute Eosinophils 0.0 0.0 - 0.7 10e3/uL    Absolute Basophils 0.1 0.0 - 0.2 10e3/uL    Absolute Immature Granulocytes 0.4 <=0.4 10e3/uL    Absolute NRBCs 0.0 10e3/uL   Extra Green Top (Lithium Heparin) Tube    Collection Time: 04/18/22  7:41 AM   Result Value Ref Range    Hold Specimen [facility]    Histoplasma Capsulatum Antigen    Collection Time: 04/18/22  7:41 AM   Result Value Ref Range    See Scanned Result HISTOPLASMA CAPSULATUM ANTIGEN-Scanned    Lactic acid whole blood    Collection Time: 04/18/22  9:41 AM   Result Value Ref Range    Lactic Acid 2.8 (H) 0.7 - 2.0 mmol/L   Acid-Fast Bacilli Culture and Stain with AFB Stain    Collection Time: 04/18/22  9:41 AM    Specimen: Other; Sputum   Result Value Ref Range    Acid Fast Stain No acid fast bacilli seen     Acid Fast Stain No acid fast bacilli seen    Blood Culture Peripheral Blood    Collection Time: 04/18/22 11:53 AM    Specimen: Peripheral Blood    Result Value Ref Range    Culture No Growth    Blood Culture Peripheral Blood    Collection Time: 04/18/22 12:50 PM    Specimen: Peripheral Blood   Result Value Ref Range    Culture No Growth    Blastomyces antibody ID    Collection Time: 04/18/22 11:22 PM   Result Value Ref Range    Blastomyces dermatitidis Abs, Precipitin None Detected None Detected   Blastomyces Agn Quant EIA Blood    Collection Time: 04/18/22 11:22 PM    Specimen: Central Venous Line; Blood   Result Value Ref Range    See Scanned Result BLASTOMYCES AGN QUANT EIA BLOOD-Scanned    Fungal Antibodies    Collection Time: 04/18/22 11:22 PM   Result Value Ref Range    Blastomyces Moira by EIA 0.3 <=0.9 IV    Aspergillus Antibody by CF <1:8 <1:8    Coccidioides Antibody by CF <1:2 <1:2    Histoplasma Mycelia, CF <1:8 <1:8    Histoplasma Yeast, CF <1:8 <1:8   Histoplasma Moira by Immunodiffusion    Collection Time: 04/18/22 11:22 PM    Specimen: Central Venous Line; Blood   Result Value Ref Range    See Scanned Result HISTOPLASMA ANTIBODY BY IMMUNODIFFUSION-Scanned    Histoplasma Antigen Quantitative by EIA    Collection Time: 04/18/22 11:22 PM   Result Value Ref Range    Histoplasma Antigen, Serum Not Detected Not Detected    Histoplasma Antigen, Serum Interp Not Detected Not Detected   Aspergillus antibody    Collection Time: 04/18/22 11:22 PM   Result Value Ref Range    Aspergillus spp. Abs, Precipitin None Detected None Detected   Aspergillus Galactomannan Antigen    Collection Time: 04/18/22 11:22 PM    Specimen: Line, venous; Blood   Result Value Ref Range    Aspergillus Galactomannan Index 0.04     Aspergillus Galact AG Negative Negative   1,3 Beta D glucan fungitell    Collection Time: 04/18/22 11:22 PM   Result Value Ref Range    (1,3)-Beta-D-Glucan <31 pg/mL    B-D GLUCAN INTERPRETATION (1,3) Negative Negative   CRP inflammation    Collection Time: 04/19/22  4:44 AM   Result Value Ref Range    CRP 5.7 (H) 0.0 - 0.8 mg/dL   Comprehensive metabolic  panel    Collection Time: 04/19/22  4:44 AM   Result Value Ref Range    Sodium 142 136 - 145 mmol/L    Potassium 3.8 3.5 - 5.0 mmol/L    Chloride 106 98 - 107 mmol/L    Carbon Dioxide (CO2) 30 22 - 31 mmol/L    Anion Gap 6 5 - 18 mmol/L    Urea Nitrogen 20 8 - 28 mg/dL    Creatinine 0.94 0.60 - 1.10 mg/dL    Calcium 7.8 (L) 8.5 - 10.5 mg/dL    Glucose 86 70 - 125 mg/dL    Alkaline Phosphatase 54 45 - 120 U/L    AST 14 0 - 40 U/L    ALT 11 0 - 45 U/L    Protein Total 4.8 (L) 6.0 - 8.0 g/dL    Albumin 1.7 (L) 3.5 - 5.0 g/dL    Bilirubin Total 0.3 0.0 - 1.0 mg/dL    GFR Estimate 62 >60 mL/min/1.73m2   Magnesium    Collection Time: 04/19/22  4:44 AM   Result Value Ref Range    Magnesium 2.0 1.8 - 2.6 mg/dL   Phosphorus    Collection Time: 04/19/22  4:44 AM   Result Value Ref Range    Phosphorus 3.4 2.5 - 4.5 mg/dL   CBC with platelets and differential    Collection Time: 04/19/22  4:44 AM   Result Value Ref Range    WBC Count 18.7 (H) 4.0 - 11.0 10e3/uL    RBC Count 3.15 (L) 3.80 - 5.20 10e6/uL    Hemoglobin 8.9 (L) 11.7 - 15.7 g/dL    Hematocrit 27.5 (L) 35.0 - 47.0 %    MCV 87 78 - 100 fL    MCH 28.3 26.5 - 33.0 pg    MCHC 32.4 31.5 - 36.5 g/dL    RDW 15.0 10.0 - 15.0 %    Platelet Count 342 150 - 450 10e3/uL    % Neutrophils 79 %    % Lymphocytes 10 %    % Monocytes 10 %    % Eosinophils 0 %    % Basophils 0 %    % Immature Granulocytes 1 %    NRBCs per 100 WBC 0 <1 /100    Absolute Neutrophils 14.7 (H) 1.6 - 8.3 10e3/uL    Absolute Lymphocytes 1.8 0.8 - 5.3 10e3/uL    Absolute Monocytes 1.9 (H) 0.0 - 1.3 10e3/uL    Absolute Eosinophils 0.1 0.0 - 0.7 10e3/uL    Absolute Basophils 0.0 0.0 - 0.2 10e3/uL    Absolute Immature Granulocytes 0.2 <=0.4 10e3/uL    Absolute NRBCs 0.0 10e3/uL   Basic metabolic panel    Collection Time: 04/20/22  6:20 AM   Result Value Ref Range    Sodium 141 136 - 145 mmol/L    Potassium 4.2 3.5 - 5.0 mmol/L    Chloride 105 98 - 107 mmol/L    Carbon Dioxide (CO2) 26 22 - 31 mmol/L    Anion Gap  10 5 - 18 mmol/L    Urea Nitrogen 19 8 - 28 mg/dL    Creatinine 0.92 0.60 - 1.10 mg/dL    Calcium 8.4 (L) 8.5 - 10.5 mg/dL    Glucose 80 70 - 125 mg/dL    GFR Estimate 64 >60 mL/min/1.73m2   CRP inflammation    Collection Time: 04/20/22  6:20 AM   Result Value Ref Range    CRP 5.9 (H) 0.0 - 0.8 mg/dL   Phosphorus    Collection Time: 04/20/22  6:20 AM   Result Value Ref Range    Phosphorus 3.2 2.5 - 4.5 mg/dL   Magnesium    Collection Time: 04/20/22  6:20 AM   Result Value Ref Range    Magnesium 2.5 1.8 - 2.6 mg/dL   CBC with platelets and differential    Collection Time: 04/20/22  6:20 AM   Result Value Ref Range    WBC Count 22.1 (H) 4.0 - 11.0 10e3/uL    RBC Count 3.65 (L) 3.80 - 5.20 10e6/uL    Hemoglobin 10.4 (L) 11.7 - 15.7 g/dL    Hematocrit 33.0 (L) 35.0 - 47.0 %    MCV 90 78 - 100 fL    MCH 28.5 26.5 - 33.0 pg    MCHC 31.5 31.5 - 36.5 g/dL    RDW 15.0 10.0 - 15.0 %    Platelet Count 403 150 - 450 10e3/uL    % Neutrophils 80 %    % Lymphocytes 9 %    % Monocytes 9 %    % Eosinophils 1 %    % Basophils 0 %    % Immature Granulocytes 1 %    NRBCs per 100 WBC 0 <1 /100    Absolute Neutrophils 17.7 (H) 1.6 - 8.3 10e3/uL    Absolute Lymphocytes 1.9 0.8 - 5.3 10e3/uL    Absolute Monocytes 2.0 (H) 0.0 - 1.3 10e3/uL    Absolute Eosinophils 0.1 0.0 - 0.7 10e3/uL    Absolute Basophils 0.0 0.0 - 0.2 10e3/uL    Absolute Immature Granulocytes 0.2 <=0.4 10e3/uL    Absolute NRBCs 0.0 10e3/uL   ECG 12-LEAD WITH MUSE ([facility])    Collection Time: 04/20/22  2:29 PM   Result Value Ref Range    Systolic Blood Pressure  mmHg    Diastolic Blood Pressure  mmHg    Ventricular Rate 79 BPM    Atrial Rate 79 BPM    WA Interval 120 ms    QRS Duration 114 ms     ms    QTc 488 ms    P Axis 79 degrees    R AXIS -33 degrees    T Axis 98 degrees    Interpretation ECG       Sinus rhythm with Premature ventricular complexes  Left axis deviation  Incomplete left bundle branch block  Nonspecific ST and T wave abnormality  Prolonged  QT  Abnormal ECG  When compared with ECG of 15-APR-2022 05:41,  No significant change was found  Confirmed by MAKENNA WAYNE MD LOC:[facility] ([id number]) on 4/20/2022 3:56:31 PM     Basic metabolic panel    Collection Time: 04/21/22  6:08 AM   Result Value Ref Range    Sodium 144 136 - 145 mmol/L    Potassium 3.5 3.5 - 5.0 mmol/L    Chloride 111 (H) 98 - 107 mmol/L    Carbon Dioxide (CO2) 26 22 - 31 mmol/L    Anion Gap 7 5 - 18 mmol/L    Urea Nitrogen 17 8 - 28 mg/dL    Creatinine 0.79 0.60 - 1.10 mg/dL    Calcium 7.4 (L) 8.5 - 10.5 mg/dL    Glucose 75 70 - 125 mg/dL    GFR Estimate 77 >60 mL/min/1.73m2   CRP inflammation    Collection Time: 04/21/22  6:08 AM   Result Value Ref Range    CRP 4.8 (H) 0.0 - 0.8 mg/dL   Magnesium    Collection Time: 04/21/22  6:08 AM   Result Value Ref Range    Magnesium 2.1 1.8 - 2.6 mg/dL   Phosphorus    Collection Time: 04/21/22  6:08 AM   Result Value Ref Range    Phosphorus 3.2 2.5 - 4.5 mg/dL   Procalcitonin    Collection Time: 04/21/22  6:08 AM   Result Value Ref Range    Procalcitonin 0.07 0.00 - 0.49 ng/mL   CBC with platelets and differential    Collection Time: 04/21/22  6:08 AM   Result Value Ref Range    WBC Count 13.8 (H) 4.0 - 11.0 10e3/uL    RBC Count 3.07 (L) 3.80 - 5.20 10e6/uL    Hemoglobin 8.7 (L) 11.7 - 15.7 g/dL    Hematocrit 27.3 (L) 35.0 - 47.0 %    MCV 89 78 - 100 fL    MCH 28.3 26.5 - 33.0 pg    MCHC 31.9 31.5 - 36.5 g/dL    RDW 15.2 (H) 10.0 - 15.0 %    Platelet Count 352 150 - 450 10e3/uL    % Neutrophils 77 %    % Lymphocytes 11 %    % Monocytes 10 %    % Eosinophils 1 %    % Basophils 0 %    % Immature Granulocytes 1 %    NRBCs per 100 WBC 0 <1 /100    Absolute Neutrophils 10.8 (H) 1.6 - 8.3 10e3/uL    Absolute Lymphocytes 1.4 0.8 - 5.3 10e3/uL    Absolute Monocytes 1.3 0.0 - 1.3 10e3/uL    Absolute Eosinophils 0.1 0.0 - 0.7 10e3/uL    Absolute Basophils 0.0 0.0 - 0.2 10e3/uL    Absolute Immature Granulocytes 0.1 <=0.4 10e3/uL    Absolute NRBCs 0.0 10e3/uL   Respiratory  Aerobic Bacterial Culture    Collection Time: 04/21/22  9:13 AM    Specimen: Expectorate; Sputum   Result Value Ref Range    Culture 3+ Normal john     Culture 4+ Candida albicans (A)     Gram Stain Result <10 Squamous epithelial cells/low power field     Gram Stain Result >25 PMNs/low power field     Gram Stain Result 2+ Mixed john    Histoplasma Galactomannan Antigen Quant by EIA, Urine    Collection Time: 04/22/22 12:22 AM   Result Value Ref Range    Histoplasma Galactomannan Ag Quant, Urn Not Detected ng/mL    Histoplasma Galactomannan Ag Interp, Urn Not Detected Not Detected   Basic metabolic panel    Collection Time: 04/22/22  6:46 AM   Result Value Ref Range    Sodium 145 136 - 145 mmol/L    Potassium 3.8 3.5 - 5.0 mmol/L    Chloride 108 (H) 98 - 107 mmol/L    Carbon Dioxide (CO2) 29 22 - 31 mmol/L    Anion Gap 8 5 - 18 mmol/L    Urea Nitrogen 20 8 - 28 mg/dL    Creatinine 0.86 0.60 - 1.10 mg/dL    Calcium 7.6 (L) 8.5 - 10.5 mg/dL    Glucose 76 70 - 125 mg/dL    GFR Estimate 69 >60 mL/min/1.73m2   CRP inflammation    Collection Time: 04/22/22  6:46 AM   Result Value Ref Range    CRP 5.0 (H) 0.0 - 0.8 mg/dL   CBC with platelets and differential    Collection Time: 04/22/22  6:46 AM   Result Value Ref Range    WBC Count 13.5 (H) 4.0 - 11.0 10e3/uL    RBC Count 2.92 (L) 3.80 - 5.20 10e6/uL    Hemoglobin 8.3 (L) 11.7 - 15.7 g/dL    Hematocrit 27.1 (L) 35.0 - 47.0 %    MCV 93 78 - 100 fL    MCH 28.4 26.5 - 33.0 pg    MCHC 30.6 (L) 31.5 - 36.5 g/dL    RDW 15.0 10.0 - 15.0 %    Platelet Count 338 150 - 450 10e3/uL    % Neutrophils 74 %    % Lymphocytes 13 %    % Monocytes 11 %    % Eosinophils 1 %    % Basophils 0 %    % Immature Granulocytes 1 %    NRBCs per 100 WBC 0 <1 /100    Absolute Neutrophils 10.1 (H) 1.6 - 8.3 10e3/uL    Absolute Lymphocytes 1.7 0.8 - 5.3 10e3/uL    Absolute Monocytes 1.4 (H) 0.0 - 1.3 10e3/uL    Absolute Eosinophils 0.1 0.0 - 0.7 10e3/uL    Absolute Basophils 0.0 0.0 - 0.2 10e3/uL     Absolute Immature Granulocytes 0.1 <=0.4 10e3/uL    Absolute NRBCs 0.0 10e3/uL   Asymptomatic COVID-19 Virus (Coronavirus) by PCR Nose    Collection Time: 04/22/22  9:57 AM    Specimen: Nose; Swab   Result Value Ref Range    SARS CoV2 PCR Negative Negative   Comprehensive metabolic panel    Collection Time: 04/26/22  8:34 AM   Result Value Ref Range    Sodium 143 136 - 145 mmol/L    Potassium 4.3 3.5 - 5.0 mmol/L    Chloride 109 (H) 98 - 107 mmol/L    Carbon Dioxide (CO2) 26 22 - 31 mmol/L    Anion Gap 8 5 - 18 mmol/L    Urea Nitrogen 18 8 - 28 mg/dL    Creatinine 0.99 0.60 - 1.10 mg/dL    Calcium 8.1 (L) 8.5 - 10.5 mg/dL    Glucose 73 70 - 125 mg/dL    Alkaline Phosphatase 66 45 - 120 U/L    AST 17 0 - 40 U/L    ALT <9 0 - 45 U/L    Protein Total 5.6 (L) 6.0 - 8.0 g/dL    Albumin 2.0 (L) 3.5 - 5.0 g/dL    Bilirubin Total 0.3 0.0 - 1.0 mg/dL    GFR Estimate 58 (L) >60 mL/min/1.73m2   CRP inflammation    Collection Time: 04/26/22  8:34 AM   Result Value Ref Range    CRP 2.7 (H) 0.0 - 0.8 mg/dL   CBC with platelets and differential    Collection Time: 04/26/22  8:34 AM   Result Value Ref Range    WBC Count 11.2 (H) 4.0 - 11.0 10e3/uL    RBC Count 3.22 (L) 3.80 - 5.20 10e6/uL    Hemoglobin 9.3 (L) 11.7 - 15.7 g/dL    Hematocrit 29.9 (L) 35.0 - 47.0 %    MCV 93 78 - 100 fL    MCH 28.9 26.5 - 33.0 pg    MCHC 31.1 (L) 31.5 - 36.5 g/dL    RDW 14.9 10.0 - 15.0 %    Platelet Count 295 150 - 450 10e3/uL    % Neutrophils 71 %    % Lymphocytes 15 %    % Monocytes 10 %    % Eosinophils 2 %    % Basophils 1 %    % Immature Granulocytes 1 %    NRBCs per 100 WBC 0 <1 /100    Absolute Neutrophils 8.0 1.6 - 8.3 10e3/uL    Absolute Lymphocytes 1.7 0.8 - 5.3 10e3/uL    Absolute Monocytes 1.2 0.0 - 1.3 10e3/uL    Absolute Eosinophils 0.2 0.0 - 0.7 10e3/uL    Absolute Basophils 0.1 0.0 - 0.2 10e3/uL    Absolute Immature Granulocytes 0.1 <=0.4 10e3/uL    Absolute NRBCs 0.0 10e3/uL             Imaging Results     NM Lung Scan Perfusion  Particulate    Result Date: 2022  EXAM: NM LUNG SCAN PERFUSION PARTICULATE LOCATION: [facility] DATE/TIME: 2022 5:18 PM INDICATION: PE suspected, high prob COMPARISON: Chest x-ray 2022, V/Q scan 2020, CT chest exam 2019 TECHNIQUE: 7.9 mCi technetium-99m MAA, IV. Standard lung perfusion imaging. FINDINGS: Heterogeneous perfusion throughout the left lung with numerous small subsegmental perfusion defects similar to the previous VQ scan. Perfusion to the right lung is more homogeneous, with a few subtle areas of decreased activity also similar to the prior exam. Chest x-ray from today demonstrates emphysema with diffuse opacities in the left upper and mid left lung suggestive of pneumonia.     IMPRESSION: 1.  Chronic perfusion abnormalities throughout both lungs similar to the previous exam. No new perfusion abnormalities. Recent chest x-ray is suggestive of left mid and upper lung pneumonia.    Echocardiogram Complete    Result Date: 2022  [id number]^TEREZA^GERALD^[place] [address]  Name: JUDY ZARCO MRN: [id number] : 1945 Study Date: 2022 02:58 PM Age: 77 yrs Gender: Female Patient Location: [facility] Reason For Study: Syncope Ordering Physician: GERALD STARKS Performed By: SUGEY  BSA: 1.4 m2 Height: 59 in Weight: 112 lb ______________________________________________________________________________ Procedure Complete Portable Echo Adult. ______________________________________________________________________________ Interpretation Summary  1. The left ventricle is normal in size. Left ventricular systolic performance is moderately reduced. The ejection fraction is estimated to be 35-40%. 2. There is moderate global reduction in left ventricular systolic performance. 3. There is abnormal septal motion possibly related to altered electrical activation due to bundle branch block. 4.  There is mild-moderate, to perhaps moderate, tricuspid insufficiency. 5. Normal right ventricular size with mildly reduced right ventricular systolic performance. 6. There is mild right atrial enlargement. 7. Right ventricular systolic pressure relative to right atrial pressure is mildly increased. The pulmonary artery pressure is estimated to be 45-50 mmHg plus right atrial pressure (the IVC is mildly dilated).  When compared to the prior real-time echocardiogram dated 28 July 2021, there has been a mild further diminution in left ventricular systolic performance (it is this reader's impression that left ventricular systolic performance was mild-moderately reduced with an ejection fraction 40-45% on the prior examination). ______________________________________________________________________________ Left ventricle: The left ventricle is normal in size. Left ventricular systolic performance is moderately reduced. The ejection fraction is estimated to be 35-40%. There is moderate global reduction in left ventricular systolic performance. There is abnormal septal motion possibly related to altered electrical activation due to bundle branch block. Left ventricular wall thickness is normal. 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  Assessment of left atrial pressure (LAP): The cumulative findings are indeterminate in evaluating left atrial pressure.  Right ventricle: Normal right ventricular size with mildly reduced right ventricular systolic performance.  Left atrium: There is borderline left atrial enlargement.  Right atrium: There is mild right atrial enlargement.  IVC: The IVC is mildly dilated.  Aortic valve: The aortic valve is comprised of three cusps.  Mitral valve: There is mild mitral annular calcification. There is trace mitral insufficiency.  Tricuspid valve: The tricuspid valve is grossly morphologically normal. There is mild-moderate, to perhaps moderate, tricuspid  insufficiency.  Pulmonic valve: The pulmonic valve is grossly morphologically normal.  Thoracic aorta: The aortic root and proximal ascending aorta are of normal dimension.  Pericardium: There is no significant pericardial effusion. ______________________________________________________________________________ ______________________________________________________________________________ MMode/2D Measurements & Calculations RVDd: 3.5 cm IVSd: 0.98 cm LVIDd: 4.1 cm LVIDs: 3.3 cm LVPWd: 0.90 cm FS: 19.4 % LV mass(C)d: 122.9 grams LV mass(C)dI: 85.3 grams/m2 Ao root diam: 2.8 cm LA dimension: 3.5 cm asc Aorta Diam: 2.8 cm LA/Ao: 1.2 LVOT diam: 1.8 cm LVOT area: 2.6 cm2  LA Volume Indexed (AL/bp): 28.8 ml/m2 RWT: 0.44  Time Measurements MM HR: 98.0 BPM  Doppler Measurements & Calculations MV E max carolyn: 89.4 cm/sec MV A max carolyn: 135.9 cm/sec MV E/A: 0.66 MV dec time: 0.12 sec LV V1 max PG: 3.2 mmHg LV V1 max: 89.2 cm/sec LV V1 VTI: 18.3 cm SV(LVOT): 48.2 ml SI(LVOT): 33.4 ml/m2 TR max carolyn: 348.4 cm/sec TR max P.6 mmHg E/E' av.9 Lateral E/e': 11.0 Medial E/e': 16.8  ______________________________________________________________________________ Report approved by: Deepak Anderson 2022 04:24 PM       US Lower Extremity Venous Duplex Bilateral    Result Date: 2022  EXAM: US LOWER EXTREMITY VENOUS DUPLEX BILATERAL LOCATION: [facility] DATE/TIME: 2022 3:49 PM INDICATION: hypoxia, hypotension; leg swelling COMPARISON: None. TECHNIQUE: Venous Duplex ultrasound of bilateral lower extremities with and without compression, augmentation and duplex. Color flow and spectral Doppler with waveform analysis performed. FINDINGS: Exam includes the common femoral, femoral, popliteal veins as well as segmentally visualized deep calf veins and greater saphenous vein. RIGHT: No deep vein thrombosis. No superficial thrombophlebitis. No popliteal cyst. LEFT: No deep vein thrombosis. No  superficial thrombophlebitis. No popliteal cyst.     IMPRESSION: 1.  No deep venous thrombosis in the bilateral lower extremities.    XR Chest Port 1 View    Result Date: 4/14/2022  EXAM: XR CHEST PORT 1 VIEW LOCATION: [facility] DATE/TIME: 4/14/2022 10:22 AM INDICATION: Worsening shortness of breath, coarse breath sounds. COMPARISON: 04/10/2022. Others.     IMPRESSION: Persistent dense left upper lobe consolidation. The surrounding opacities are stable to perhaps marginally improved, though the dense consolidative abnormality is without substantial change. Increased lucency superior to the consolidation could be from aerated lung or developing cavitation. Recommend continued follow-up. Small left pleural effusion. Right lung is grossly clear. Stable cardiomediastinal silhouette. Atherosclerosis. Right PICC tip near the distal SVC. NOTE: ABNORMAL REPORT THE DICTATION ABOVE DESCRIBES AN ABNORMALITY FOR WHICH FOLLOW-UP IS NEEDED.     XR Chest Port 1 View    Result Date: 4/10/2022  EXAM: XR CHEST PORTABLE 1 VIEW LOCATION: [facility] DATE/TIME: 04/10/2022, 7:56 AM INDICATION: Shortness of breath. COMPARISON: CT of the chest, abdomen, and pelvis performed 04/08/2022. Chest radiograph performed 04/07/2022.     IMPRESSION: Ill-defined consolidation and infiltrate in the left upper lobe is again noted. Mild hyperinflation both lungs. The right lung is otherwise clear. Tortuous calcified thoracic aorta. Heart size has increased in prominence compared to 04/07/2022. Pulmonary vascularity is within normal limits where seen. Old right rib fractures. Right PICC line has been removed.     XR Chest Port 1 View    Result Date: 4/7/2022  EXAM: XR CHEST PORT 1 VIEW LOCATION: [facility] DATE/TIME: 4/7/2022 4:49 PM INDICATION: Cough. COMPARISON: 06/02/2021.     IMPRESSION: New moderate opacity in the left mid to upper lung suggestive of pneumonia given  history of cough. However, this requires follow-up to complete resolution to exclude an underlying lesion (6-8 week follow-up radiographs). Right PICC tip near the cavoatrial junction. Normal heart size. Atherosclerosis. No significant pleural effusion.     CT Chest Abdomen Pelvis w/o Contrast    Result Date: 4/8/2022  EXAM: CT CHEST ABDOMEN PELVIS W/O CONTRAST LOCATION: [facility] DATE/TIME: 4/8/2022 2:17 PM INDICATION: History of breast cancer. Pneumonia. Weight loss. COMPARISON: CT chest 6/12/2020, CT chest/abdomen/pelvis 6/11/2019 TECHNIQUE: CT scan of the chest, abdomen, and pelvis was performed without IV contrast. Multiplanar reformats were obtained. Dose reduction techniques were used. CONTRAST: None. FINDINGS: LUNGS AND PLEURA: Moderate irregular consolidation in the posterior left upper lobe. Confluent emphysema. Mild bibasilar atelectasis/scar. Trace pleural effusions. Stable mild diffuse bronchial wall thickening. A few stable benign pulmonary nodules. MEDIASTINUM/AXILLAE: Right PICC. Mediastinal lymphadenopathy with the largest largest being a right paratracheal node measuring 1.6 cm in short axis, image 48:3. Small pericardial effusion. The main pulmonary artery is enlarged at 3.0 cm which may be seen with pulmonary artery hypertension. CORONARY ARTERY CALCIFICATION: Moderate. HEPATOBILIARY: Normal. PANCREAS: Normal. SPLEEN: Normal. ADRENAL GLANDS: Normal. KIDNEYS/BLADDER: Low-lying right kidney. Subcentimeter renal hypodensities which are too small to characterize. No hydronephrosis. BOWEL: No obstruction or inflammatory change. Normal appendix. LYMPH NODES: Normal. VASCULATURE: Stable ectasia of the abdominal aorta measuring 2.9 x 2.3 cm, image 169:3. Moderate atherosclerosis. PELVIC ORGANS: Normal. MUSCULOSKELETAL: Left hip arthroplasty. Osseous demineralization. Degenerative changes of the spine. Remote left rib fractures. T12 compression fracture with 80% loss of height  which is likely remote.     IMPRESSION: 1.  Moderate left upper lobe consolidation. This is consistent with pneumonia. Recommend a follow-up chest radiograph in 4-6 weeks to document resolution. 2.  Mediastinal lymphadenopathy. 3.  No acute abnormality in the abdomen or pelvis.    CT Chest w/o Contrast    Result Date: 4/15/2022  EXAM: CT CHEST W/O CONTRAST LOCATION: [facility] DATE/TIME: 4/15/2022 1:21 PM INDICATION: Pneumonia, effusion or abscess suspected, xray done COMPARISON: CT 4/8/2022 TECHNIQUE: CT chest without IV contrast. Multiplanar reformats were obtained. Dose reduction techniques were used. CONTRAST: None. FINDINGS: LUNGS AND PLEURA: There is now prominent cavity within the consolidated focus left upper lobe. Cavity measures approximately 5 cm with surrounding thick wall and lung consolidation surrounding fat. Mild increase in overall size of left upper lobe involvement. Tiny left pleural effusion now present. Minimal linear density at lung bases. Slight mucus plugging seen posterior right lower lobe. Prominent diffuse changes of emphysema. MEDIASTINUM/AXILLAE: Small reactive lymph nodes are stable. Heavy atherosclerotic calcifications of aorta. CORONARY ARTERY CALCIFICATION: Moderate. UPPER ABDOMEN: Diffuse atherosclerotic calcifications. MUSCULOSKELETAL: Scoliosis and severe compression of T12 unchanged     IMPRESSION: 1.  There is new cavity formation involving the left upper lobe pneumonia with a 5 cm cavity now present within the densely consolidated lung. 2.  Diffuse emphysema unchanged.           Electronically signed by    Paola Rose MD

## 2022-04-25 NOTE — TELEPHONE ENCOUNTER
----- Message -----  From: Lady Latif MD  Sent: 4/21/2022   1:49 PM CDT  To: Lauren Valdivia CMA, #  Subject: ID Clinic follow up with Dr. Travis or Dr. MONTAÑO    The patient Irene León needs follow up in ID clinic in 2-3 weeks. I am away and not available in that time frame. Would you please schedule a clinic follow up with Dr. Traivs, Dr. Little (or any available physician if both not available)? Patient is going to TCU in 1-2 days.     Thank you so much!

## 2022-04-25 NOTE — TELEPHONE ENCOUNTER
Date: 5/11/2022 Status: Scheduled   Time: 2:40 PM Length: 20   Visit Type: RETURN [657] Copay: $0.00   Provider: Isai Little MD Department: [facility]   Bill Area: Infectious Disease [facility]

## 2022-04-25 NOTE — TELEPHONE ENCOUNTER
Per Jayda brannon/[facility] will call to schedule CT in 2 weeks and schedule f/u with Dr. Travis in 3 weeks

## 2022-04-25 NOTE — PROGRESS NOTES
Clinic Care Coordination Contact  Care Coordination Transition Communication         Clinical Data: Patient was hospitalized at  from 4/7 to 4/22 with diagnosis of Community Acquired Pneumonia   ZONIA pulmonary abscess  C-difficile positive  COPD exacerbation  Anxiety.     Transition to Facility:              Facility Name: [facility]              Contact name and phone number/fax: [phone]    Plan: RN/SW Care Coordinator will await notification from facility staff informing RN/SW Care Coordinator of patient's discharge plans/needs. RN/SW Care Coordinator will review chart and outreach to facility staff every 4 weeks and as needed.

## 2022-04-25 NOTE — LETTER
4/25/2022        RE: Irene León  [address] [facility] [address]         [facility] GERIATRIC SERVICES  Chief Complaint   Patient presents with     Hospital F/U     [place] Medical Record Number:  [id number]  Place of Service where encounter took place:  [facility] ([facility]) [[id number]]  Code Status:  No CPR- Do NOT Intubate     HISTORY:      HPI:  Irene León  is 77 year old (1945) undergoing physical and occupational therapy.  She is with PMHx heart failure,LBBB, HTN, factor V Leiden, TIA, and atrial fibrillation on Xarelto, CKD stage 3, and 35+ lb unintentional weight loss in the past one year.Per hospital records she admitted on 4/7/2022 for community acquired pneumonia in the setting of severe COPD on baseline 2L O2 by NC.  Well. Hospitalization was initially complicated by resolved delirium, followed by frequent stooling and diagnosis of C difficile diarrhea. After completing a course of antibiotics for pneumonia, she experienced acute worsening of respiratory status and repeat CT chest revealed interim development of a 5cm left upper lobe cavitation. Infectious disease and pulmonology were consulted.  Antibiotics were broadened to zosyn then transitioned to meropenem.  Due to the patient's baseline poor pulmonary status, bronchoscopy was discussed but ultimately decided with her clinical improvement the risk would be great.  The patient declined bronchoscopy as well.      Clinically, following initiation of meropenem for her pulmonary abscess and fidaxomicin for her C. difficile infection, her WBC and CRP continue to improve. She was titrated to her home baseline 2 L oxygen continuously.   Per pulmonology recommendations, outpatient pulmonary rehab would be recommended as well as CT chest in 2 weeks.     Per infectious disease recommendations, she should continue on 3 weeks of IV meropenem, she should have repeat CT of chest in 2 weeks and follow-up appointment with infectious disease  in 2 to 3 weeks to discuss imaging and if further antibiotics intravenously will be necessary.  Plan for weekly CBC CMP CRP monitoring.  Per infectious disease, plan for vancomycin tapering course with titration over the next 2 months, and completion of fidaxomicin 10-day course.     Early in her hospitalization she had a brief episode of hospital related delirium.  Medications were adjusted and her Vesicare and Myrbetriq were ultimately held due to the increased risk of delirium associated.  I recommended not resuming these medications, given she is on a diuretic. The family would like to consider resuming at discharge from transitional care facility.    Due to her anxiety in the setting of acute illness, mirtazapine 15 mg nightly was started.  She is tolerating this well, and has seen improvement in sleep and baseline anxiety levels.    After discussions with palliative care and pulmonology, her CODE STATUS was made DNR/DNI.    Today she is seen to review vital signs, labs, follow-up COPD exacerbation and to establish care.  She denied chest pain.  She denied increased shortness of breath beyond baseline she wascurrently on 3 L of oxygen per nasal cannula. It appears her home dose is 2L NC.. she has been on oxygen for approximately 1 year per her report.  She denied constipation diarrhea.  Her bowel movements are now formed.  She is on a vancomycin taper.  She continues on IV antibiotics and being followed by infectious disease.  She will have a follow-up CT of her chest done in 2 weeks.  She was noted to have an elevated blood pressure this morning 195/84 for with a pulse of 87 however this was an isolated reading and other blood pressure readings have been stable.      ALLERGIES:Sulfa (sulfonamide antibiotics) [sulfa drugs], Homeopathic drugs [external allergen needs reconciliation - see comment], Mold extracts [molds & smuts], Morphine (pf) [morphine], Lisinopril, and Sulfacetamide sodium  [sulfacetamide]    PAST MEDICAL HISTORY:   Past Medical History:   Diagnosis Date     ANATOLIY (acute kidney injury) (H)      Allergic rhinitis      Arrhythmia      Atrial fibrillation with RVR (H)      Bacteremia      Breast cancer (H) 2017     Cardiomyopathy (H)      Centrilobular emphysema (H)      CHF (congestive heart failure) (H)      CKD (chronic kidney disease)      COPD (chronic obstructive pulmonary disease) (H)      Coronary artery disease      Depression      Dysphagia      E. coli sepsis (H)      Factor 5 Leiden mutation, heterozygous (H)      GERD (gastroesophageal reflux disease)      Hx of radiation therapy 2017     Hyperlipidemia      Hypertension      Hypokalemia      Hypomagnesemia      Idiopathic cardiomyopathy (H)      Left hip pain 4/30/2014     OAB (overactive bladder)      Osteoporosis      Sacral insufficiency fracture      Sinusitis      Syncope      Urge incontinence      Vocal cord dysfunction        PAST SURGICAL HISTORY:   has a past surgical history that includes IR Abdominal Aortogram (4/16/2003); IR Miscellaneous Procedure (4/16/2003); IR Aortic Arch 4 Vessel Angiogram (4/16/2003); Arthroplasty revision hip (Left); OPEN TX FEMORAL FRACTURE DISTAL MED/LAT CONDYLE (Left, 10/28/2015); Cardiac catheterization; Cataract Extraction (Left, 07/18/2017); Biopsy breast (Right, 2017); Bladder surgery; Lumpectomy breast (Left, 06/2017); PICC/Midline Placement (4/7/2022); and PICC/Midline Placement (4/11/2022).    FAMILY HISTORY: family history includes Breast Cancer in her maternal aunt; Osteoporosis in an other family member; Prostate Cancer in her brother and maternal uncle.    SOCIAL HISTORY:  reports that she quit smoking about 14 years ago. She quit smokeless tobacco use about 17 years ago. She reports that she does not drink alcohol and does not use drugs.    ROS:  Constitutional: Negative for activity change, appetite change, fatigue and fever.   HENT: Negative for congestion.    Respiratory:  "Negative for cough, shortness of breath and wheezing. Continuous oxygen 3l NC   Cardiovascular: Negative for chest pain and leg swelling.   Gastrointestinal: Negative for abdominal distention, abdominal pain, constipation, diarrhea and nausea. Recent C- diff, BM's now formed per staff  Genitourinary: Negative for dysuria.   Musculoskeletal: Negative for arthralgia. Negative for back pain.   Skin: Negative for color change and wound.   Neurological: Negative for dizziness.   Psychiatric/Behavioral: Negative for agitation, behavioral problems and confusion.     Physical Exam:  Constitutional:       Appearance: Patient is well-developed.   HENT:      Head: Normocephalic.   Eyes:      Conjunctiva/sclera: Conjunctivae normal.   Neck:      Musculoskeletal: Normal range of motion.   Cardiovascular:      Rate and Rhythm: Normal rate and regular rhythm.      Heart sounds: Normal heart sounds. No murmur.   Pulmonary:      Effort: No respiratory distress.      Breath sounds: Normal breath sounds. No wheezing or rales.   Abdominal:      General: Bowel sounds are normal. There is no distension.      Palpations: Abdomen is soft.      Tenderness: There is no abdominal tenderness.   Musculoskeletal:       Normal range of motion.     Skin:General:        Skin is warm.   Neurological:         Mental Status: Patient is alert and oriented to person, place, and time.   Psychiatric:         Behavior: Behavior normal.     Vitals:BP (!) 195/84   Pulse 87   Temp 98.4  F (36.9  C)   Resp 28   Ht 1.499 m (4' 11\")   Wt 52.2 kg (115 lb)   SpO2 92%   BMI 23.23 kg/m   and Body mass index is 23.23 kg/m .    Lab/Diagnostic data:   Recent Results (from the past 240 hour(s))   MRSA MSSA PCR, Nasal Swab    Collection Time: 04/15/22  4:47 PM    Specimen: Nares, Bilateral; Swab   Result Value Ref Range    MRSA Target DNA Negative Negative    SA Target DNA Negative    Potassium    Collection Time: 04/15/22  8:09 PM   Result Value Ref Range    " Potassium 4.6 3.5 - 5.0 mmol/L   T cell subset profile    Collection Time: 04/15/22  8:09 PM   Result Value Ref Range    CD3% Total T Cells 58 49 - 84 %    Absolute CD3, Total T Cells 317 (L) 603 - 2,990 cells/uL    CD4% Colton T Cells 50 28 - 63 %    Absolute CD4, [place]ton T Cells 274 (L) 441 - 2,156 cells/uL    CD8% Suppressor T Cells 8 (L) 10 - 40 %    Absolute CD8, Suppressor T Cells 44 (L) 125 - 1,312 cells/uL    CD4:CD8 Ratio 6.18 (H) 1.40 - 2.60    T Cell Comment     Histoplasma Moira by Immunodiffusion    Collection Time: 04/15/22  8:09 PM    Specimen: Arm, Left; Blood   Result Value Ref Range    See Scanned Result HISTOPLASMA ANTIBODY BY IMMUNODIFFUSION-Scanned    Basic metabolic panel    Collection Time: 04/16/22  6:36 AM   Result Value Ref Range    Sodium 141 136 - 145 mmol/L    Potassium 4.2 3.5 - 5.0 mmol/L    Chloride 105 98 - 107 mmol/L    Carbon Dioxide (CO2) 30 22 - 31 mmol/L    Anion Gap 6 5 - 18 mmol/L    Urea Nitrogen 23 8 - 28 mg/dL    Creatinine 0.88 0.60 - 1.10 mg/dL    Calcium 7.6 (L) 8.5 - 10.5 mg/dL    Glucose 73 70 - 125 mg/dL    GFR Estimate 67 >60 mL/min/1.73m2   CRP inflammation    Collection Time: 04/16/22  6:36 AM   Result Value Ref Range    CRP 8.7 (H) 0.0 - 0.8 mg/dL   Phosphorus    Collection Time: 04/16/22  6:36 AM   Result Value Ref Range    Phosphorus 3.7 2.5 - 4.5 mg/dL   Magnesium    Collection Time: 04/16/22  6:36 AM   Result Value Ref Range    Magnesium 2.3 1.8 - 2.6 mg/dL   Blastomyces antibody ID    Collection Time: 04/16/22  6:36 AM   Result Value Ref Range    Blastomyces dermatitidis Abs, Precipitin None Detected None Detected   Aspergillus Galactomannan Antigen    Collection Time: 04/16/22  6:36 AM    Specimen: Arm, Right; Blood   Result Value Ref Range    Aspergillus Galactomannan Index 0.40     Aspergillus Galact AG Negative Negative   Cryptococcus antigen    Collection Time: 04/16/22  6:36 AM    Specimen: Arm, Right; Blood   Result Value Ref Range    Cryptococcal Antigen  Negative Negative    Cryptococcal titer interpretation na    CBC with platelets and differential    Collection Time: 04/16/22  6:36 AM   Result Value Ref Range    WBC Count 20.1 (H) 4.0 - 11.0 10e3/uL    RBC Count 3.46 (L) 3.80 - 5.20 10e6/uL    Hemoglobin 9.8 (L) 11.7 - 15.7 g/dL    Hematocrit 29.7 (L) 35.0 - 47.0 %    MCV 86 78 - 100 fL    MCH 28.3 26.5 - 33.0 pg    MCHC 33.0 31.5 - 36.5 g/dL    RDW 14.7 10.0 - 15.0 %    Platelet Count 307 150 - 450 10e3/uL    % Neutrophils 82 %    % Lymphocytes 8 %    % Monocytes 8 %    % Eosinophils 0 %    % Basophils 0 %    % Immature Granulocytes 2 %    NRBCs per 100 WBC 0 <1 /100    Absolute Neutrophils 16.4 (H) 1.6 - 8.3 10e3/uL    Absolute Lymphocytes 1.6 0.8 - 5.3 10e3/uL    Absolute Monocytes 1.6 (H) 0.0 - 1.3 10e3/uL    Absolute Eosinophils 0.0 0.0 - 0.7 10e3/uL    Absolute Basophils 0.0 0.0 - 0.2 10e3/uL    Absolute Immature Granulocytes 0.4 <=0.4 10e3/uL    Absolute NRBCs 0.0 10e3/uL   Respiratory Aerobic Bacterial Culture with Gram Stain    Collection Time: 04/16/22  2:39 PM    Specimen: Expectorate; Sputum   Result Value Ref Range    Culture       >10 Squamous epithelial cells/low power field indicates oral contamination. Please recollect.    Gram Stain Result >10 Squamous epithelial cells/low power field     Gram Stain Result <25 PMNs/low power field     Gram Stain Result 3+ Mixed john    Nocardia species culture    Collection Time: 04/16/22  2:39 PM    Specimen: Expectorate; Sputum   Result Value Ref Range    Culture No growth after 8 days    Acid-Fast Bacilli Culture and Stain with AFB Stain    Collection Time: 04/16/22  2:39 PM    Specimen: Buccal (not smear); Sputum   Result Value Ref Range    Acid Fast Stain No acid fast bacilli seen     Acid Fast Stain No acid fast bacilli seen    Histoplasma Galactomannan Antigen Quant by EIA, Urine    Collection Time: 04/16/22  8:21 PM   Result Value Ref Range    Histoplasma Galactomannan Ag Quant, Urn Not Detected ng/mL     Histoplasma Galactomannan Ag Interp, Urn Not Detected Not Detected   Basic metabolic panel    Collection Time: 04/17/22  4:55 AM   Result Value Ref Range    Sodium 141 136 - 145 mmol/L    Potassium 4.4 3.5 - 5.0 mmol/L    Chloride 104 98 - 107 mmol/L    Carbon Dioxide (CO2) 28 22 - 31 mmol/L    Anion Gap 9 5 - 18 mmol/L    Urea Nitrogen 20 8 - 28 mg/dL    Creatinine 0.94 0.60 - 1.10 mg/dL    Calcium 8.2 (L) 8.5 - 10.5 mg/dL    Glucose 71 70 - 125 mg/dL    GFR Estimate 62 >60 mL/min/1.73m2   CRP inflammation    Collection Time: 04/17/22  4:55 AM   Result Value Ref Range    CRP 7.0 (H) 0.0 - 0.8 mg/dL   Magnesium    Collection Time: 04/17/22  4:55 AM   Result Value Ref Range    Magnesium 2.1 1.8 - 2.6 mg/dL   Phosphorus    Collection Time: 04/17/22  4:55 AM   Result Value Ref Range    Phosphorus 3.4 2.5 - 4.5 mg/dL   CBC with platelets and differential    Collection Time: 04/17/22  4:55 AM   Result Value Ref Range    WBC Count 23.4 (H) 4.0 - 11.0 10e3/uL    RBC Count 3.97 3.80 - 5.20 10e6/uL    Hemoglobin 11.2 (L) 11.7 - 15.7 g/dL    Hematocrit 34.4 (L) 35.0 - 47.0 %    MCV 87 78 - 100 fL    MCH 28.2 26.5 - 33.0 pg    MCHC 32.6 31.5 - 36.5 g/dL    RDW 14.8 10.0 - 15.0 %    Platelet Count 418 150 - 450 10e3/uL    % Neutrophils 81 %    % Lymphocytes 9 %    % Monocytes 8 %    % Eosinophils 0 %    % Basophils 0 %    % Immature Granulocytes 2 %    NRBCs per 100 WBC 0 <1 /100    Absolute Neutrophils 18.8 (H) 1.6 - 8.3 10e3/uL    Absolute Lymphocytes 2.2 0.8 - 5.3 10e3/uL    Absolute Monocytes 1.9 (H) 0.0 - 1.3 10e3/uL    Absolute Eosinophils 0.1 0.0 - 0.7 10e3/uL    Absolute Basophils 0.0 0.0 - 0.2 10e3/uL    Absolute Immature Granulocytes 0.4 <=0.4 10e3/uL    Absolute NRBCs 0.0 10e3/uL   Lactic Acid STAT    Collection Time: 04/17/22  4:55 AM   Result Value Ref Range    Lactic Acid 1.1 0.7 - 2.0 mmol/L   Acid-Fast Bacilli Culture and Stain with AFB Stain    Collection Time: 04/17/22  5:00 AM    Specimen: Mouth; Sputum    Result Value Ref Range    Acid Fast Stain No acid fast bacilli seen     Acid Fast Stain No acid fast bacilli seen    Basic metabolic panel    Collection Time: 04/18/22  7:41 AM   Result Value Ref Range    Sodium 142 136 - 145 mmol/L    Potassium 3.6 3.5 - 5.0 mmol/L    Chloride 105 98 - 107 mmol/L    Carbon Dioxide (CO2) 28 22 - 31 mmol/L    Anion Gap 9 5 - 18 mmol/L    Urea Nitrogen 20 8 - 28 mg/dL    Creatinine 0.91 0.60 - 1.10 mg/dL    Calcium 8.0 (L) 8.5 - 10.5 mg/dL    Glucose 83 70 - 125 mg/dL    GFR Estimate 65 >60 mL/min/1.73m2   CRP inflammation    Collection Time: 04/18/22  7:41 AM   Result Value Ref Range    CRP 7.0 (H) 0.0 - 0.8 mg/dL   Magnesium    Collection Time: 04/18/22  7:41 AM   Result Value Ref Range    Magnesium 1.8 1.8 - 2.6 mg/dL   Phosphorus    Collection Time: 04/18/22  7:41 AM   Result Value Ref Range    Phosphorus 2.6 2.5 - 4.5 mg/dL   CBC with platelets and differential    Collection Time: 04/18/22  7:41 AM   Result Value Ref Range    WBC Count 31.4 (H) 4.0 - 11.0 10e3/uL    RBC Count 3.65 (L) 3.80 - 5.20 10e6/uL    Hemoglobin 10.2 (L) 11.7 - 15.7 g/dL    Hematocrit 31.4 (L) 35.0 - 47.0 %    MCV 86 78 - 100 fL    MCH 27.9 26.5 - 33.0 pg    MCHC 32.5 31.5 - 36.5 g/dL    RDW 14.8 10.0 - 15.0 %    Platelet Count 414 150 - 450 10e3/uL    % Neutrophils 90 %    % Lymphocytes 3 %    % Monocytes 6 %    % Eosinophils 0 %    % Basophils 0 %    % Immature Granulocytes 1 %    NRBCs per 100 WBC 0 <1 /100    Absolute Neutrophils 28.2 (H) 1.6 - 8.3 10e3/uL    Absolute Lymphocytes 0.9 0.8 - 5.3 10e3/uL    Absolute Monocytes 1.9 (H) 0.0 - 1.3 10e3/uL    Absolute Eosinophils 0.0 0.0 - 0.7 10e3/uL    Absolute Basophils 0.1 0.0 - 0.2 10e3/uL    Absolute Immature Granulocytes 0.4 <=0.4 10e3/uL    Absolute NRBCs 0.0 10e3/uL   Extra Green Top (Lithium Heparin) Tube    Collection Time: 04/18/22  7:41 AM   Result Value Ref Range    Hold Specimen [facility]    Histoplasma Capsulatum Antigen    Collection Time: 04/18/22   7:41 AM   Result Value Ref Range    See Scanned Result HISTOPLASMA CAPSULATUM ANTIGEN-Scanned    Lactic acid whole blood    Collection Time: 04/18/22  9:41 AM   Result Value Ref Range    Lactic Acid 2.8 (H) 0.7 - 2.0 mmol/L   Acid-Fast Bacilli Culture and Stain with AFB Stain    Collection Time: 04/18/22  9:41 AM    Specimen: Other; Sputum   Result Value Ref Range    Acid Fast Stain No acid fast bacilli seen     Acid Fast Stain No acid fast bacilli seen    Blood Culture Peripheral Blood    Collection Time: 04/18/22 11:53 AM    Specimen: Peripheral Blood   Result Value Ref Range    Culture No Growth    Blood Culture Peripheral Blood    Collection Time: 04/18/22 12:50 PM    Specimen: Peripheral Blood   Result Value Ref Range    Culture No Growth    Blastomyces antibody ID    Collection Time: 04/18/22 11:22 PM   Result Value Ref Range    Blastomyces dermatitidis Abs, Precipitin None Detected None Detected   Blastomyces Agn Quant EIA Blood    Collection Time: 04/18/22 11:22 PM    Specimen: Central Venous Line; Blood   Result Value Ref Range    See Scanned Result BLASTOMYCES AGN QUANT EIA BLOOD-Scanned    Fungal Antibodies    Collection Time: 04/18/22 11:22 PM   Result Value Ref Range    Blastomyces Moira by EIA 0.3 <=0.9 IV    Aspergillus Antibody by CF <1:8 <1:8    Coccidioides Antibody by CF <1:2 <1:2    Histoplasma Mycelia, CF <1:8 <1:8    Histoplasma Yeast, CF <1:8 <1:8   Histoplasma Antigen Quantitative by EIA    Collection Time: 04/18/22 11:22 PM   Result Value Ref Range    Histoplasma Antigen, Serum Not Detected Not Detected    Histoplasma Antigen, Serum Interp Not Detected Not Detected   Aspergillus antibody    Collection Time: 04/18/22 11:22 PM   Result Value Ref Range    Aspergillus spp. Abs, Precipitin None Detected None Detected   Aspergillus Galactomannan Antigen    Collection Time: 04/18/22 11:22 PM    Specimen: Line, venous; Blood   Result Value Ref Range    Aspergillus Galactomannan Index 0.04      Aspergillus Galact AG Negative Negative   1,3 Beta D glucan fungitell    Collection Time: 04/18/22 11:22 PM   Result Value Ref Range    (1,3)-Beta-D-Glucan <31 pg/mL    B-D GLUCAN INTERPRETATION (1,3) Negative Negative   CRP inflammation    Collection Time: 04/19/22  4:44 AM   Result Value Ref Range    CRP 5.7 (H) 0.0 - 0.8 mg/dL   Comprehensive metabolic panel    Collection Time: 04/19/22  4:44 AM   Result Value Ref Range    Sodium 142 136 - 145 mmol/L    Potassium 3.8 3.5 - 5.0 mmol/L    Chloride 106 98 - 107 mmol/L    Carbon Dioxide (CO2) 30 22 - 31 mmol/L    Anion Gap 6 5 - 18 mmol/L    Urea Nitrogen 20 8 - 28 mg/dL    Creatinine 0.94 0.60 - 1.10 mg/dL    Calcium 7.8 (L) 8.5 - 10.5 mg/dL    Glucose 86 70 - 125 mg/dL    Alkaline Phosphatase 54 45 - 120 U/L    AST 14 0 - 40 U/L    ALT 11 0 - 45 U/L    Protein Total 4.8 (L) 6.0 - 8.0 g/dL    Albumin 1.7 (L) 3.5 - 5.0 g/dL    Bilirubin Total 0.3 0.0 - 1.0 mg/dL    GFR Estimate 62 >60 mL/min/1.73m2   Magnesium    Collection Time: 04/19/22  4:44 AM   Result Value Ref Range    Magnesium 2.0 1.8 - 2.6 mg/dL   Phosphorus    Collection Time: 04/19/22  4:44 AM   Result Value Ref Range    Phosphorus 3.4 2.5 - 4.5 mg/dL   CBC with platelets and differential    Collection Time: 04/19/22  4:44 AM   Result Value Ref Range    WBC Count 18.7 (H) 4.0 - 11.0 10e3/uL    RBC Count 3.15 (L) 3.80 - 5.20 10e6/uL    Hemoglobin 8.9 (L) 11.7 - 15.7 g/dL    Hematocrit 27.5 (L) 35.0 - 47.0 %    MCV 87 78 - 100 fL    MCH 28.3 26.5 - 33.0 pg    MCHC 32.4 31.5 - 36.5 g/dL    RDW 15.0 10.0 - 15.0 %    Platelet Count 342 150 - 450 10e3/uL    % Neutrophils 79 %    % Lymphocytes 10 %    % Monocytes 10 %    % Eosinophils 0 %    % Basophils 0 %    % Immature Granulocytes 1 %    NRBCs per 100 WBC 0 <1 /100    Absolute Neutrophils 14.7 (H) 1.6 - 8.3 10e3/uL    Absolute Lymphocytes 1.8 0.8 - 5.3 10e3/uL    Absolute Monocytes 1.9 (H) 0.0 - 1.3 10e3/uL    Absolute Eosinophils 0.1 0.0 - 0.7 10e3/uL     Absolute Basophils 0.0 0.0 - 0.2 10e3/uL    Absolute Immature Granulocytes 0.2 <=0.4 10e3/uL    Absolute NRBCs 0.0 10e3/uL   Basic metabolic panel    Collection Time: 04/20/22  6:20 AM   Result Value Ref Range    Sodium 141 136 - 145 mmol/L    Potassium 4.2 3.5 - 5.0 mmol/L    Chloride 105 98 - 107 mmol/L    Carbon Dioxide (CO2) 26 22 - 31 mmol/L    Anion Gap 10 5 - 18 mmol/L    Urea Nitrogen 19 8 - 28 mg/dL    Creatinine 0.92 0.60 - 1.10 mg/dL    Calcium 8.4 (L) 8.5 - 10.5 mg/dL    Glucose 80 70 - 125 mg/dL    GFR Estimate 64 >60 mL/min/1.73m2   CRP inflammation    Collection Time: 04/20/22  6:20 AM   Result Value Ref Range    CRP 5.9 (H) 0.0 - 0.8 mg/dL   Phosphorus    Collection Time: 04/20/22  6:20 AM   Result Value Ref Range    Phosphorus 3.2 2.5 - 4.5 mg/dL   Magnesium    Collection Time: 04/20/22  6:20 AM   Result Value Ref Range    Magnesium 2.5 1.8 - 2.6 mg/dL   CBC with platelets and differential    Collection Time: 04/20/22  6:20 AM   Result Value Ref Range    WBC Count 22.1 (H) 4.0 - 11.0 10e3/uL    RBC Count 3.65 (L) 3.80 - 5.20 10e6/uL    Hemoglobin 10.4 (L) 11.7 - 15.7 g/dL    Hematocrit 33.0 (L) 35.0 - 47.0 %    MCV 90 78 - 100 fL    MCH 28.5 26.5 - 33.0 pg    MCHC 31.5 31.5 - 36.5 g/dL    RDW 15.0 10.0 - 15.0 %    Platelet Count 403 150 - 450 10e3/uL    % Neutrophils 80 %    % Lymphocytes 9 %    % Monocytes 9 %    % Eosinophils 1 %    % Basophils 0 %    % Immature Granulocytes 1 %    NRBCs per 100 WBC 0 <1 /100    Absolute Neutrophils 17.7 (H) 1.6 - 8.3 10e3/uL    Absolute Lymphocytes 1.9 0.8 - 5.3 10e3/uL    Absolute Monocytes 2.0 (H) 0.0 - 1.3 10e3/uL    Absolute Eosinophils 0.1 0.0 - 0.7 10e3/uL    Absolute Basophils 0.0 0.0 - 0.2 10e3/uL    Absolute Immature Granulocytes 0.2 <=0.4 10e3/uL    Absolute NRBCs 0.0 10e3/uL   ECG 12-LEAD WITH MUSE ([facility])    Collection Time: 04/20/22  2:29 PM   Result Value Ref Range    Systolic Blood Pressure  mmHg    Diastolic Blood Pressure  mmHg    Ventricular Rate  79 BPM    Atrial Rate 79 BPM    NC Interval 120 ms    QRS Duration 114 ms     ms    QTc 488 ms    P Axis 79 degrees    R AXIS -33 degrees    T Axis 98 degrees    Interpretation ECG       Sinus rhythm with Premature ventricular complexes  Left axis deviation  Incomplete left bundle branch block  Nonspecific ST and T wave abnormality  Prolonged QT  Abnormal ECG  When compared with ECG of 15-APR-2022 05:41,  No significant change was found  Confirmed by MAKENNA WAYNE MD LOC: ([id number]) on 4/20/2022 3:56:31 PM     Basic metabolic panel    Collection Time: 04/21/22  6:08 AM   Result Value Ref Range    Sodium 144 136 - 145 mmol/L    Potassium 3.5 3.5 - 5.0 mmol/L    Chloride 111 (H) 98 - 107 mmol/L    Carbon Dioxide (CO2) 26 22 - 31 mmol/L    Anion Gap 7 5 - 18 mmol/L    Urea Nitrogen 17 8 - 28 mg/dL    Creatinine 0.79 0.60 - 1.10 mg/dL    Calcium 7.4 (L) 8.5 - 10.5 mg/dL    Glucose 75 70 - 125 mg/dL    GFR Estimate 77 >60 mL/min/1.73m2   CRP inflammation    Collection Time: 04/21/22  6:08 AM   Result Value Ref Range    CRP 4.8 (H) 0.0 - 0.8 mg/dL   Magnesium    Collection Time: 04/21/22  6:08 AM   Result Value Ref Range    Magnesium 2.1 1.8 - 2.6 mg/dL   Phosphorus    Collection Time: 04/21/22  6:08 AM   Result Value Ref Range    Phosphorus 3.2 2.5 - 4.5 mg/dL   Procalcitonin    Collection Time: 04/21/22  6:08 AM   Result Value Ref Range    Procalcitonin 0.07 0.00 - 0.49 ng/mL   CBC with platelets and differential    Collection Time: 04/21/22  6:08 AM   Result Value Ref Range    WBC Count 13.8 (H) 4.0 - 11.0 10e3/uL    RBC Count 3.07 (L) 3.80 - 5.20 10e6/uL    Hemoglobin 8.7 (L) 11.7 - 15.7 g/dL    Hematocrit 27.3 (L) 35.0 - 47.0 %    MCV 89 78 - 100 fL    MCH 28.3 26.5 - 33.0 pg    MCHC 31.9 31.5 - 36.5 g/dL    RDW 15.2 (H) 10.0 - 15.0 %    Platelet Count 352 150 - 450 10e3/uL    % Neutrophils 77 %    % Lymphocytes 11 %    % Monocytes 10 %    % Eosinophils 1 %    % Basophils 0 %    % Immature Granulocytes 1 %     NRBCs per 100 WBC 0 <1 /100    Absolute Neutrophils 10.8 (H) 1.6 - 8.3 10e3/uL    Absolute Lymphocytes 1.4 0.8 - 5.3 10e3/uL    Absolute Monocytes 1.3 0.0 - 1.3 10e3/uL    Absolute Eosinophils 0.1 0.0 - 0.7 10e3/uL    Absolute Basophils 0.0 0.0 - 0.2 10e3/uL    Absolute Immature Granulocytes 0.1 <=0.4 10e3/uL    Absolute NRBCs 0.0 10e3/uL   Respiratory Aerobic Bacterial Culture    Collection Time: 04/21/22  9:13 AM    Specimen: Expectorate; Sputum   Result Value Ref Range    Culture 3+ Normal john     Culture 4+ Candida albicans (A)     Gram Stain Result <10 Squamous epithelial cells/low power field     Gram Stain Result >25 PMNs/low power field     Gram Stain Result 2+ Mixed john    Histoplasma Galactomannan Antigen Quant by EIA, Urine    Collection Time: 04/22/22 12:22 AM   Result Value Ref Range    Histoplasma Galactomannan Ag Quant, Urn Not Detected ng/mL    Histoplasma Galactomannan Ag Interp, Urn Not Detected Not Detected   Basic metabolic panel    Collection Time: 04/22/22  6:46 AM   Result Value Ref Range    Sodium 145 136 - 145 mmol/L    Potassium 3.8 3.5 - 5.0 mmol/L    Chloride 108 (H) 98 - 107 mmol/L    Carbon Dioxide (CO2) 29 22 - 31 mmol/L    Anion Gap 8 5 - 18 mmol/L    Urea Nitrogen 20 8 - 28 mg/dL    Creatinine 0.86 0.60 - 1.10 mg/dL    Calcium 7.6 (L) 8.5 - 10.5 mg/dL    Glucose 76 70 - 125 mg/dL    GFR Estimate 69 >60 mL/min/1.73m2   CRP inflammation    Collection Time: 04/22/22  6:46 AM   Result Value Ref Range    CRP 5.0 (H) 0.0 - 0.8 mg/dL   CBC with platelets and differential    Collection Time: 04/22/22  6:46 AM   Result Value Ref Range    WBC Count 13.5 (H) 4.0 - 11.0 10e3/uL    RBC Count 2.92 (L) 3.80 - 5.20 10e6/uL    Hemoglobin 8.3 (L) 11.7 - 15.7 g/dL    Hematocrit 27.1 (L) 35.0 - 47.0 %    MCV 93 78 - 100 fL    MCH 28.4 26.5 - 33.0 pg    MCHC 30.6 (L) 31.5 - 36.5 g/dL    RDW 15.0 10.0 - 15.0 %    Platelet Count 338 150 - 450 10e3/uL    % Neutrophils 74 %    % Lymphocytes 13 %    %  Monocytes 11 %    % Eosinophils 1 %    % Basophils 0 %    % Immature Granulocytes 1 %    NRBCs per 100 WBC 0 <1 /100    Absolute Neutrophils 10.1 (H) 1.6 - 8.3 10e3/uL    Absolute Lymphocytes 1.7 0.8 - 5.3 10e3/uL    Absolute Monocytes 1.4 (H) 0.0 - 1.3 10e3/uL    Absolute Eosinophils 0.1 0.0 - 0.7 10e3/uL    Absolute Basophils 0.0 0.0 - 0.2 10e3/uL    Absolute Immature Granulocytes 0.1 <=0.4 10e3/uL    Absolute NRBCs 0.0 10e3/uL   Asymptomatic COVID-19 Virus (Coronavirus) by PCR Nose    Collection Time: 04/22/22  9:57 AM    Specimen: Nose; Swab   Result Value Ref Range    SARS CoV2 PCR Negative Negative   There is    MEDICATIONS:     Review of your medicines          Accurate as of April 25, 2022  4:45 PM. If you have any questions, ask your nurse or doctor.            CONTINUE these medicines which may have CHANGED, or have new prescriptions. If we are uncertain of the size of tablets/capsules you have at home, strength may be listed as something that might have changed.      Dose / Directions   Atrovent HFA 17 MCG/ACT inhaler  This may have changed:     how much to take    how to take this    when to take this  Used for: Chronic obstructive pulmonary disease, unspecified COPD type (H)  Generic drug: ipratropium      [ATROVENT HFA 17 MCG/ACTUATION INHALER] USE 2 INHALATIONS EVERY 6 HOURS  Quantity: 77.4 g  Refills: 3        CONTINUE these medicines which have NOT CHANGED      Dose / Directions   acetaminophen 500 MG tablet  Commonly known as: TYLENOL      Dose: 1,000 mg  Take 1,000 mg by mouth every 8 hours as needed for mild pain or fever  Refills: 0     albuterol (2.5 MG/3ML) 0.083% neb solution  Commonly known as: PROVENTIL  Used for: COPD exacerbation (H), Pulmonary emphysema, unspecified emphysema type (H)      Dose: 2.5 mg  Take 1 vial (2.5 mg) by nebulization every 2 hours as needed for shortness of breath / dyspnea  Quantity: 90 mL  Refills: 0     ascorbic acid 1000 MG Tabs tablet      Dose: 1,000  mg  [ASCORBIC ACID, VITAMIN C, (VITAMIN C) 1000 MG TABLET] Take 1,000 mg by mouth daily. Airborne 1 tab daily  Refills: 0     cetirizine 10 MG tablet  Commonly known as: zyrTEC      Dose: 10 mg  Take 10 mg by mouth daily as needed for allergies  Refills: 0     chlorhexidine 0.12 % solution  Commonly known as: PERIDEX      Dose: 15 mL  [CHLORHEXIDINE (PERIDEX) 0.12 % SOLUTION] Apply 15 mL to the mouth or throat at bedtime as needed.  Refills: 0     cholecalciferol 25 MCG (1000 UT) Tabs      Dose: 1,000 Units  [CHOLECALCIFEROL, VITAMIN D3, 1,000 UNIT TABLET] Take 1,000 Units by mouth daily.  Refills: 0     EPINEPHrine 0.3 MG/0.3ML injection 2-pack  Commonly known as: ANY BX GENERIC EQUIV      Dose: 0.3 mg  Inject 0.3 mg into the muscle as needed for anaphylaxis  Refills: 0     famotidine 20 MG tablet  Commonly known as: PEPCID  Used for: COPD exacerbation (H), Pulmonary emphysema, unspecified emphysema type (H)      Dose: 20 mg  Take 1 tablet (20 mg) by mouth At Bedtime  Quantity: 30 tablet  Refills: 0     fidaxomicin 200 MG tablet  Commonly known as: DIFICID  Indication: Clostridium difficile Bacteria  Used for: C. difficile colitis      Dose: 200 mg  Take 1 tablet (200 mg) by mouth 2 times daily for 6 days  Quantity: 12 tablet  Refills: 0     fluticasone 50 MCG/ACT nasal spray  Commonly known as: FLONASE  Used for: COPD exacerbation (H), Pulmonary emphysema, unspecified emphysema type (H)      Dose: 1 spray  Spray 1 spray into both nostrils daily  Quantity: 15.8 mL  Refills: 0     Fluticasone-Umeclidin-Vilanterol 200-62.5-25 MCG/INH oral inhaler  Commonly known as: TRELEGY ELLIPTA      Dose: 1 puff  Inhale 1 puff into the lungs At Bedtime  Refills: 0     furosemide 20 MG tablet  Commonly known as: LASIX  Used for: COPD exacerbation (H), Pulmonary emphysema, unspecified emphysema type (H), Acute on chronic diastolic congestive heart failure (H)      Dose: 20 mg  Take 1 tablet (20 mg) by mouth daily  Quantity: 30  tablet  Refills: 0     * gabapentin 300 MG capsule  Commonly known as: NEURONTIN  Used for: COPD exacerbation (H), Pulmonary emphysema, unspecified emphysema type (H)      Dose: 300 mg  Take 1 capsule (300 mg) by mouth At Bedtime  Quantity: 30 capsule  Refills: 0     * gabapentin 100 MG capsule  Commonly known as: NEURONTIN  Used for: COPD exacerbation (H), Pulmonary emphysema, unspecified emphysema type (H)      Dose: 200 mg  Take 2 capsules (200 mg) by mouth every morning  Quantity: 60 capsule  Refills: 0     * guaiFENesin 600 MG 12 hr tablet  Commonly known as: MUCINEX  Used for: COPD exacerbation (H), Pulmonary emphysema, unspecified emphysema type (H)      Dose: 1,200 mg  Take 2 tablets (1,200 mg) by mouth 2 times daily  Quantity: 60 tablet  Refills: 0     * guaiFENesin 20 mg/mL Soln solution  Commonly known as: ROBITUSSIN  Used for: COPD exacerbation (H), Pulmonary emphysema, unspecified emphysema type (H)      Dose: 10 mL  Take 10 mLs by mouth every 4 hours as needed for cough  Quantity: 118 mL  Refills: 0     ipratropium - albuterol 0.5 mg/2.5 mg/3 mL 0.5-2.5 (3) MG/3ML neb solution  Commonly known as: DUONEB  Used for: COPD exacerbation (H), Pulmonary emphysema, unspecified emphysema type (H)      Dose: 3 mL  Take 1 vial (3 mLs) by nebulization 4 times daily  Quantity: 90 mL  Refills: 0     lactobacillus rhamnosus (GG) capsule  Used for: COPD exacerbation (H), Pulmonary emphysema, unspecified emphysema type (H)      Dose: 1 capsule  Take 1 capsule by mouth 2 times daily  Quantity: 60 capsule  Refills: 0     melatonin 5 MG tablet  Used for: COPD exacerbation (H), Pulmonary emphysema, unspecified emphysema type (H)      Dose: 5 mg  Take 1 tablet (5 mg) by mouth At Bedtime  Quantity: 30 tablet  Refills: 0     menthol-zinc oxide 0.44-20.6 % Oint ointment  Commonly known as: CALMOSEPTINE  Used for: COPD exacerbation (H), Pulmonary emphysema, unspecified emphysema type (H)      Apply topically 4 times daily as  needed for skin protection  Quantity: 113 g  Refills: 0     meropenem 1 g vial  Commonly known as: MERREM  Indication: Abscess  Used for: Abscess of upper lobe of left lung with pneumonia (H)      Dose: 1 g  Inject 1,000 mg (1 g) into the vein every 12 hours for 21 days  Quantity: 42 each  Refills: 0     metoprolol succinate ER 25 MG 24 hr tablet  Commonly known as: TOPROL-XL  Used for: COPD exacerbation (H), Pulmonary emphysema, unspecified emphysema type (H), Acute on chronic diastolic congestive heart failure (H)      Dose: 75 mg  Take 3 tablets (75 mg) by mouth At Bedtime  Quantity: 90 tablet  Refills: 0     mirtazapine 15 MG tablet  Commonly known as: REMERON  Used for: COPD exacerbation (H), Pulmonary emphysema, unspecified emphysema type (H)      Dose: 15 mg  Take 1 tablet (15 mg) by mouth At Bedtime  Quantity: 30 tablet  Refills: 0     potassium chloride ER 10 MEQ CR tablet  Commonly known as: K-TAB/KLOR-CON  Used for: Hypokalemia      Dose: 10 mEq  Take 1 tablet (10 mEq) by mouth 2 times daily  Quantity: 180 tablet  Refills: 3     predniSONE 5 MG tablet  Commonly known as: DELTASONE  Used for: COPD exacerbation (H), Pulmonary emphysema, unspecified emphysema type (H)      Dose: 5 mg  Take 1 tablet (5 mg) by mouth daily for 3 days  Quantity: 3 tablet  Refills: 0     rivaroxaban ANTICOAGULANT 15 MG Tabs tablet  Commonly known as: XARELTO  Used for: History of stroke      Dose: 15 mg  Take 1 tablet (15 mg) by mouth At Bedtime  Quantity: 90 tablet  Refills: 3     simvastatin 40 MG tablet  Commonly known as: ZOCOR  Used for: Hypercholesterolemia      Dose: 40 mg  Take 1 tablet (40 mg) by mouth At Bedtime  Quantity: 90 tablet  Refills: 3     sodium chloride 0.9 % infusion      Inject into the vein continuous Use 10 ml intravenously two times a day for  flushing before and after each IV medication  administration. after IVAB  Refills: 0     tamoxifen 20 MG tablet  Commonly known as: NOLVADEX  Used for: Malignant  neoplasm of central portion of left breast in female, estrogen receptor positive (H)      TAKE 1 TABLET DAILY  Quantity: 90 tablet  Refills: 3     traMADol 50 MG tablet  Commonly known as: ULTRAM  Used for: COPD exacerbation (H), Pulmonary emphysema, unspecified emphysema type (H)      Dose: 50 mg  Take 1 tablet (50 mg) by mouth 2 times daily  Quantity: 60 tablet  Refills: 0     * vancomycin 125 MG capsule  Commonly known as: VANCOCIN  Indication: Clostridium difficile Bacteria  Used for: C. difficile colitis      Dose: 125 mg  Take 1 capsule (125 mg) by mouth 4 times daily  Quantity: 56 capsule  Refills: 0     * vancomycin 125 MG capsule  Commonly known as: VANCOCIN  Indication: Clostridium difficile Bacteria  Used for: C. difficile colitis      Dose: 125 mg  Start taking on: May 5, 2022  Take 1 capsule (125 mg) by mouth 2 times daily  Quantity: 14 capsule  Refills: 0     * vancomycin 125 MG capsule  Commonly known as: VANCOCIN  Indication: Clostridium difficile Bacteria  Used for: C. difficile colitis      Dose: 125 mg  Start taking on: May 13, 2022  Take 1 capsule (125 mg) by mouth daily  Quantity: 7 capsule  Refills: 0     * vancomycin 125 MG capsule  Commonly known as: VANCOCIN  Indication: Clostridium difficile Bacteria  Used for: C. difficile colitis      Dose: 125 mg  Start taking on: May 20, 2022  Take 1 capsule (125 mg) by mouth every 48 hours  Quantity: 14 capsule  Refills: 0         * This list has 8 medication(s) that are the same as other medications prescribed for you. Read the directions carefully, and ask your doctor or other care provider to review them with you.            STOP taking    multivitamin RENAL 1 MG tablet  Stopped by: Willa Sharpe CNP               ASSESSMENT/PLAN  Encounter Diagnoses   Name Primary?     Pulmonary emphysema, unspecified emphysema type (H) Yes     Physical deconditioning      Acute on chronic diastolic congestive heart failure (H)      Essential hypertension       Pulmonary emphysema- Prednisone x3 days,Nebulizers and Atrovent  Inhaler QID, Ellipta    Physical deconditioning PT OT    Congestive heart failure - daily weights, Lasix    Hypertension Lasix decreased in the hospital to 20 mg daily, Continue 75 mg metoprolol succinate daily     C. difficile currently on a vancomycin taper    Pain management - PRN Tramadol, PRN Tylenol, and scheduled Gabapentin     Left breast Cancer- On Tamoxifen, Follow up with Oncology    HDL anticoagulation management on Xarelto Zocor 40 mg HS    Anticoagulation management on Xarelto    Pneumonia On IV Merrem, Followed by Infectious Disease, Follow up chest CT in 2 weeks       Electronically signed by: Willa Sharpe CNP        Sincerely,        Willa Sharpe CNP

## 2022-04-25 NOTE — PROGRESS NOTES
[facility] GERIATRIC SERVICES  Chief Complaint   Patient presents with     Hospital F/U     [facility] Medical Record Number:  [id number]  Place of Service where encounter took place:  [facility] ([facility]) [[id number]]  Code Status:  No CPR- Do NOT Intubate     HISTORY:      HPI:  Irene León  is 77 year old (1945) undergoing physical and occupational therapy.  She is with PMHx heart failure,LBBB, HTN, factor V Leiden, TIA, and atrial fibrillation on Xarelto, CKD stage 3, and 35+ lb unintentional weight loss in the past one year.Per hospital records she admitted on 4/7/2022 for community acquired pneumonia in the setting of severe COPD on baseline 2L O2 by NC.  Well. Hospitalization was initially complicated by resolved delirium, followed by frequent stooling and diagnosis of C difficile diarrhea. After completing a course of antibiotics for pneumonia, she experienced acute worsening of respiratory status and repeat CT chest revealed interim development of a 5cm left upper lobe cavitation. Infectious disease and pulmonology were consulted.  Antibiotics were broadened to zosyn then transitioned to meropenem.  Due to the patient's baseline poor pulmonary status, bronchoscopy was discussed but ultimately decided with her clinical improvement the risk would be great.  The patient declined bronchoscopy as well.      Clinically, following initiation of meropenem for her pulmonary abscess and fidaxomicin for her C. difficile infection, her WBC and CRP continue to improve. She was titrated to her home baseline 2 L oxygen continuously.   Per pulmonology recommendations, outpatient pulmonary rehab would be recommended as well as CT chest in 2 weeks.     Per infectious disease recommendations, she should continue on 3 weeks of IV meropenem, she should have repeat CT of chest in 2 weeks and follow-up appointment with infectious disease in 2 to 3 weeks to discuss imaging and if further antibiotics intravenously will be  necessary.  Plan for weekly CBC CMP CRP monitoring.  Per infectious disease, plan for vancomycin tapering course with titration over the next 2 months, and completion of fidaxomicin 10-day course.     Early in her hospitalization she had a brief episode of hospital related delirium.  Medications were adjusted and her Vesicare and Myrbetriq were ultimately held due to the increased risk of delirium associated.  I recommended not resuming these medications, given she is on a diuretic. The family would like to consider resuming at discharge from transitional care facility.    Due to her anxiety in the setting of acute illness, mirtazapine 15 mg nightly was started.  She is tolerating this well, and has seen improvement in sleep and baseline anxiety levels.    After discussions with palliative care and pulmonology, her CODE STATUS was made DNR/DNI.    Today she is seen to review vital signs, labs, follow-up COPD exacerbation and to establish care.  She denied chest pain.  She denied increased shortness of breath beyond baseline she wascurrently on 3 L of oxygen per nasal cannula. It appears her home dose is 2L NC.. she has been on oxygen for approximately 1 year per her report.  She denied constipation diarrhea.  Her bowel movements are now formed.  She is on a vancomycin taper.  She continues on IV antibiotics and being followed by infectious disease.  She will have a follow-up CT of her chest done in 2 weeks.  She was noted to have an elevated blood pressure this morning 195/84 for with a pulse of 87 however this was an isolated reading and other blood pressure readings have been stable.      ALLERGIES:Sulfa (sulfonamide antibiotics) [sulfa drugs], Homeopathic drugs [external allergen needs reconciliation - see comment], Mold extracts [molds & smuts], Morphine (pf) [morphine], Lisinopril, and Sulfacetamide sodium [sulfacetamide]    PAST MEDICAL HISTORY:   Past Medical History:   Diagnosis Date     ANATOLIY (acute kidney  injury) (H)      Allergic rhinitis      Arrhythmia      Atrial fibrillation with RVR (H)      Bacteremia      Breast cancer (H) 2017     Cardiomyopathy (H)      Centrilobular emphysema (H)      CHF (congestive heart failure) (H)      CKD (chronic kidney disease)      COPD (chronic obstructive pulmonary disease) (H)      Coronary artery disease      Depression      Dysphagia      E. coli sepsis (H)      Factor 5 Leiden mutation, heterozygous (H)      GERD (gastroesophageal reflux disease)      Hx of radiation therapy 2017     Hyperlipidemia      Hypertension      Hypokalemia      Hypomagnesemia      Idiopathic cardiomyopathy (H)      Left hip pain 4/30/2014     OAB (overactive bladder)      Osteoporosis      Sacral insufficiency fracture      Sinusitis      Syncope      Urge incontinence      Vocal cord dysfunction        PAST SURGICAL HISTORY:   has a past surgical history that includes IR Abdominal Aortogram (4/16/2003); IR Miscellaneous Procedure (4/16/2003); IR Aortic Arch 4 Vessel Angiogram (4/16/2003); Arthroplasty revision hip (Left); OPEN TX FEMORAL FRACTURE DISTAL MED/LAT CONDYLE (Left, 10/28/2015); Cardiac catheterization; Cataract Extraction (Left, 07/18/2017); Biopsy breast (Right, 2017); Bladder surgery; Lumpectomy breast (Left, 06/2017); PICC/Midline Placement (4/7/2022); and PICC/Midline Placement (4/11/2022).    FAMILY HISTORY: family history includes Breast Cancer in her maternal aunt; Osteoporosis in an other family member; Prostate Cancer in her brother and maternal uncle.    SOCIAL HISTORY:  reports that she quit smoking about 14 years ago. She quit smokeless tobacco use about 17 years ago. She reports that she does not drink alcohol and does not use drugs.    ROS:  Constitutional: Negative for activity change, appetite change, fatigue and fever.   HENT: Negative for congestion.    Respiratory: Negative for cough, shortness of breath and wheezing. Continuous oxygen 3l NC   Cardiovascular: Negative  "for chest pain and leg swelling.   Gastrointestinal: Negative for abdominal distention, abdominal pain, constipation, diarrhea and nausea. Recent C- diff, BM's now formed per staff  Genitourinary: Negative for dysuria.   Musculoskeletal: Negative for arthralgia. Negative for back pain.   Skin: Negative for color change and wound.   Neurological: Negative for dizziness.   Psychiatric/Behavioral: Negative for agitation, behavioral problems and confusion.     Physical Exam:  Constitutional:       Appearance: Patient is well-developed.   HENT:      Head: Normocephalic.   Eyes:      Conjunctiva/sclera: Conjunctivae normal.   Neck:      Musculoskeletal: Normal range of motion.   Cardiovascular:      Rate and Rhythm: Normal rate and regular rhythm.      Heart sounds: Normal heart sounds. No murmur.   Pulmonary:      Effort: No respiratory distress.      Breath sounds: Normal breath sounds. No wheezing or rales.   Abdominal:      General: Bowel sounds are normal. There is no distension.      Palpations: Abdomen is soft.      Tenderness: There is no abdominal tenderness.   Musculoskeletal:       Normal range of motion.     Skin:General:        Skin is warm.   Neurological:         Mental Status: Patient is alert and oriented to person, place, and time.   Psychiatric:         Behavior: Behavior normal.     Vitals:BP (!) 195/84   Pulse 87   Temp 98.4  F (36.9  C)   Resp 28   Ht 1.499 m (4' 11\")   Wt 52.2 kg (115 lb)   SpO2 92%   BMI 23.23 kg/m   and Body mass index is 23.23 kg/m .    Lab/Diagnostic data:   Recent Results (from the past 240 hour(s))   MRSA MSSA PCR, Nasal Swab    Collection Time: 04/15/22  4:47 PM    Specimen: Nares, Bilateral; Swab   Result Value Ref Range    MRSA Target DNA Negative Negative    SA Target DNA Negative    Potassium    Collection Time: 04/15/22  8:09 PM   Result Value Ref Range    Potassium 4.6 3.5 - 5.0 mmol/L   T cell subset profile    Collection Time: 04/15/22  8:09 PM   Result Value " Ref Range    CD3% Total T Cells 58 49 - 84 %    Absolute CD3, Total T Cells 317 (L) 603 - 2,990 cells/uL    CD4% [place] T Cells 50 28 - 63 %    Absolute CD4, [place] T Cells 274 (L) 441 - 2,156 cells/uL    CD8% Suppressor T Cells 8 (L) 10 - 40 %    Absolute CD8, Suppressor T Cells 44 (L) 125 - 1,312 cells/uL    CD4:CD8 Ratio 6.18 (H) 1.40 - 2.60    T Cell Comment     Histoplasma Moira by Immunodiffusion    Collection Time: 04/15/22  8:09 PM    Specimen: Arm, Left; Blood   Result Value Ref Range    See Scanned Result HISTOPLASMA ANTIBODY BY IMMUNODIFFUSION-Scanned    Basic metabolic panel    Collection Time: 04/16/22  6:36 AM   Result Value Ref Range    Sodium 141 136 - 145 mmol/L    Potassium 4.2 3.5 - 5.0 mmol/L    Chloride 105 98 - 107 mmol/L    Carbon Dioxide (CO2) 30 22 - 31 mmol/L    Anion Gap 6 5 - 18 mmol/L    Urea Nitrogen 23 8 - 28 mg/dL    Creatinine 0.88 0.60 - 1.10 mg/dL    Calcium 7.6 (L) 8.5 - 10.5 mg/dL    Glucose 73 70 - 125 mg/dL    GFR Estimate 67 >60 mL/min/1.73m2   CRP inflammation    Collection Time: 04/16/22  6:36 AM   Result Value Ref Range    CRP 8.7 (H) 0.0 - 0.8 mg/dL   Phosphorus    Collection Time: 04/16/22  6:36 AM   Result Value Ref Range    Phosphorus 3.7 2.5 - 4.5 mg/dL   Magnesium    Collection Time: 04/16/22  6:36 AM   Result Value Ref Range    Magnesium 2.3 1.8 - 2.6 mg/dL   Blastomyces antibody ID    Collection Time: 04/16/22  6:36 AM   Result Value Ref Range    Blastomyces dermatitidis Abs, Precipitin None Detected None Detected   Aspergillus Galactomannan Antigen    Collection Time: 04/16/22  6:36 AM    Specimen: Arm, Right; Blood   Result Value Ref Range    Aspergillus Galactomannan Index 0.40     Aspergillus Galact AG Negative Negative   Cryptococcus antigen    Collection Time: 04/16/22  6:36 AM    Specimen: Arm, Right; Blood   Result Value Ref Range    Cryptococcal Antigen Negative Negative    Cryptococcal titer interpretation na    CBC with platelets and differential     Collection Time: 04/16/22  6:36 AM   Result Value Ref Range    WBC Count 20.1 (H) 4.0 - 11.0 10e3/uL    RBC Count 3.46 (L) 3.80 - 5.20 10e6/uL    Hemoglobin 9.8 (L) 11.7 - 15.7 g/dL    Hematocrit 29.7 (L) 35.0 - 47.0 %    MCV 86 78 - 100 fL    MCH 28.3 26.5 - 33.0 pg    MCHC 33.0 31.5 - 36.5 g/dL    RDW 14.7 10.0 - 15.0 %    Platelet Count 307 150 - 450 10e3/uL    % Neutrophils 82 %    % Lymphocytes 8 %    % Monocytes 8 %    % Eosinophils 0 %    % Basophils 0 %    % Immature Granulocytes 2 %    NRBCs per 100 WBC 0 <1 /100    Absolute Neutrophils 16.4 (H) 1.6 - 8.3 10e3/uL    Absolute Lymphocytes 1.6 0.8 - 5.3 10e3/uL    Absolute Monocytes 1.6 (H) 0.0 - 1.3 10e3/uL    Absolute Eosinophils 0.0 0.0 - 0.7 10e3/uL    Absolute Basophils 0.0 0.0 - 0.2 10e3/uL    Absolute Immature Granulocytes 0.4 <=0.4 10e3/uL    Absolute NRBCs 0.0 10e3/uL   Respiratory Aerobic Bacterial Culture with Gram Stain    Collection Time: 04/16/22  2:39 PM    Specimen: Expectorate; Sputum   Result Value Ref Range    Culture       >10 Squamous epithelial cells/low power field indicates oral contamination. Please recollect.    Gram Stain Result >10 Squamous epithelial cells/low power field     Gram Stain Result <25 PMNs/low power field     Gram Stain Result 3+ Mixed john    Nocardia species culture    Collection Time: 04/16/22  2:39 PM    Specimen: Expectorate; Sputum   Result Value Ref Range    Culture No growth after 8 days    Acid-Fast Bacilli Culture and Stain with AFB Stain    Collection Time: 04/16/22  2:39 PM    Specimen: Buccal (not smear); Sputum   Result Value Ref Range    Acid Fast Stain No acid fast bacilli seen     Acid Fast Stain No acid fast bacilli seen    Histoplasma Galactomannan Antigen Quant by EIA, Urine    Collection Time: 04/16/22  8:21 PM   Result Value Ref Range    Histoplasma Galactomannan Ag Quant, Urn Not Detected ng/mL    Histoplasma Galactomannan Ag Interp, Urn Not Detected Not Detected   Basic metabolic panel     Collection Time: 04/17/22  4:55 AM   Result Value Ref Range    Sodium 141 136 - 145 mmol/L    Potassium 4.4 3.5 - 5.0 mmol/L    Chloride 104 98 - 107 mmol/L    Carbon Dioxide (CO2) 28 22 - 31 mmol/L    Anion Gap 9 5 - 18 mmol/L    Urea Nitrogen 20 8 - 28 mg/dL    Creatinine 0.94 0.60 - 1.10 mg/dL    Calcium 8.2 (L) 8.5 - 10.5 mg/dL    Glucose 71 70 - 125 mg/dL    GFR Estimate 62 >60 mL/min/1.73m2   CRP inflammation    Collection Time: 04/17/22  4:55 AM   Result Value Ref Range    CRP 7.0 (H) 0.0 - 0.8 mg/dL   Magnesium    Collection Time: 04/17/22  4:55 AM   Result Value Ref Range    Magnesium 2.1 1.8 - 2.6 mg/dL   Phosphorus    Collection Time: 04/17/22  4:55 AM   Result Value Ref Range    Phosphorus 3.4 2.5 - 4.5 mg/dL   CBC with platelets and differential    Collection Time: 04/17/22  4:55 AM   Result Value Ref Range    WBC Count 23.4 (H) 4.0 - 11.0 10e3/uL    RBC Count 3.97 3.80 - 5.20 10e6/uL    Hemoglobin 11.2 (L) 11.7 - 15.7 g/dL    Hematocrit 34.4 (L) 35.0 - 47.0 %    MCV 87 78 - 100 fL    MCH 28.2 26.5 - 33.0 pg    MCHC 32.6 31.5 - 36.5 g/dL    RDW 14.8 10.0 - 15.0 %    Platelet Count 418 150 - 450 10e3/uL    % Neutrophils 81 %    % Lymphocytes 9 %    % Monocytes 8 %    % Eosinophils 0 %    % Basophils 0 %    % Immature Granulocytes 2 %    NRBCs per 100 WBC 0 <1 /100    Absolute Neutrophils 18.8 (H) 1.6 - 8.3 10e3/uL    Absolute Lymphocytes 2.2 0.8 - 5.3 10e3/uL    Absolute Monocytes 1.9 (H) 0.0 - 1.3 10e3/uL    Absolute Eosinophils 0.1 0.0 - 0.7 10e3/uL    Absolute Basophils 0.0 0.0 - 0.2 10e3/uL    Absolute Immature Granulocytes 0.4 <=0.4 10e3/uL    Absolute NRBCs 0.0 10e3/uL   Lactic Acid STAT    Collection Time: 04/17/22  4:55 AM   Result Value Ref Range    Lactic Acid 1.1 0.7 - 2.0 mmol/L   Acid-Fast Bacilli Culture and Stain with AFB Stain    Collection Time: 04/17/22  5:00 AM    Specimen: Mouth; Sputum   Result Value Ref Range    Acid Fast Stain No acid fast bacilli seen     Acid Fast Stain No acid  fast bacilli seen    Basic metabolic panel    Collection Time: 04/18/22  7:41 AM   Result Value Ref Range    Sodium 142 136 - 145 mmol/L    Potassium 3.6 3.5 - 5.0 mmol/L    Chloride 105 98 - 107 mmol/L    Carbon Dioxide (CO2) 28 22 - 31 mmol/L    Anion Gap 9 5 - 18 mmol/L    Urea Nitrogen 20 8 - 28 mg/dL    Creatinine 0.91 0.60 - 1.10 mg/dL    Calcium 8.0 (L) 8.5 - 10.5 mg/dL    Glucose 83 70 - 125 mg/dL    GFR Estimate 65 >60 mL/min/1.73m2   CRP inflammation    Collection Time: 04/18/22  7:41 AM   Result Value Ref Range    CRP 7.0 (H) 0.0 - 0.8 mg/dL   Magnesium    Collection Time: 04/18/22  7:41 AM   Result Value Ref Range    Magnesium 1.8 1.8 - 2.6 mg/dL   Phosphorus    Collection Time: 04/18/22  7:41 AM   Result Value Ref Range    Phosphorus 2.6 2.5 - 4.5 mg/dL   CBC with platelets and differential    Collection Time: 04/18/22  7:41 AM   Result Value Ref Range    WBC Count 31.4 (H) 4.0 - 11.0 10e3/uL    RBC Count 3.65 (L) 3.80 - 5.20 10e6/uL    Hemoglobin 10.2 (L) 11.7 - 15.7 g/dL    Hematocrit 31.4 (L) 35.0 - 47.0 %    MCV 86 78 - 100 fL    MCH 27.9 26.5 - 33.0 pg    MCHC 32.5 31.5 - 36.5 g/dL    RDW 14.8 10.0 - 15.0 %    Platelet Count 414 150 - 450 10e3/uL    % Neutrophils 90 %    % Lymphocytes 3 %    % Monocytes 6 %    % Eosinophils 0 %    % Basophils 0 %    % Immature Granulocytes 1 %    NRBCs per 100 WBC 0 <1 /100    Absolute Neutrophils 28.2 (H) 1.6 - 8.3 10e3/uL    Absolute Lymphocytes 0.9 0.8 - 5.3 10e3/uL    Absolute Monocytes 1.9 (H) 0.0 - 1.3 10e3/uL    Absolute Eosinophils 0.0 0.0 - 0.7 10e3/uL    Absolute Basophils 0.1 0.0 - 0.2 10e3/uL    Absolute Immature Granulocytes 0.4 <=0.4 10e3/uL    Absolute NRBCs 0.0 10e3/uL   Extra Green Top (Lithium Heparin) Tube    Collection Time: 04/18/22  7:41 AM   Result Value Ref Range    Hold Specimen [facility]    Histoplasma Capsulatum Antigen    Collection Time: 04/18/22  7:41 AM   Result Value Ref Range    See Scanned Result HISTOPLASMA CAPSULATUM ANTIGEN-Scanned     Lactic acid whole blood    Collection Time: 04/18/22  9:41 AM   Result Value Ref Range    Lactic Acid 2.8 (H) 0.7 - 2.0 mmol/L   Acid-Fast Bacilli Culture and Stain with AFB Stain    Collection Time: 04/18/22  9:41 AM    Specimen: Other; Sputum   Result Value Ref Range    Acid Fast Stain No acid fast bacilli seen     Acid Fast Stain No acid fast bacilli seen    Blood Culture Peripheral Blood    Collection Time: 04/18/22 11:53 AM    Specimen: Peripheral Blood   Result Value Ref Range    Culture No Growth    Blood Culture Peripheral Blood    Collection Time: 04/18/22 12:50 PM    Specimen: Peripheral Blood   Result Value Ref Range    Culture No Growth    Blastomyces antibody ID    Collection Time: 04/18/22 11:22 PM   Result Value Ref Range    Blastomyces dermatitidis Abs, Precipitin None Detected None Detected   Blastomyces Agn Quant EIA Blood    Collection Time: 04/18/22 11:22 PM    Specimen: Central Venous Line; Blood   Result Value Ref Range    See Scanned Result BLASTOMYCES AGN QUANT EIA BLOOD-Scanned    Fungal Antibodies    Collection Time: 04/18/22 11:22 PM   Result Value Ref Range    Blastomyces Moira by EIA 0.3 <=0.9 IV    Aspergillus Antibody by CF <1:8 <1:8    Coccidioides Antibody by CF <1:2 <1:2    Histoplasma Mycelia, CF <1:8 <1:8    Histoplasma Yeast, CF <1:8 <1:8   Histoplasma Antigen Quantitative by EIA    Collection Time: 04/18/22 11:22 PM   Result Value Ref Range    Histoplasma Antigen, Serum Not Detected Not Detected    Histoplasma Antigen, Serum Interp Not Detected Not Detected   Aspergillus antibody    Collection Time: 04/18/22 11:22 PM   Result Value Ref Range    Aspergillus spp. Abs, Precipitin None Detected None Detected   Aspergillus Galactomannan Antigen    Collection Time: 04/18/22 11:22 PM    Specimen: Line, venous; Blood   Result Value Ref Range    Aspergillus Galactomannan Index 0.04     Aspergillus Galact AG Negative Negative   1,3 Beta D glucan fungitell    Collection Time: 04/18/22  11:22 PM   Result Value Ref Range    (1,3)-Beta-D-Glucan <31 pg/mL    B-D GLUCAN INTERPRETATION (1,3) Negative Negative   CRP inflammation    Collection Time: 04/19/22  4:44 AM   Result Value Ref Range    CRP 5.7 (H) 0.0 - 0.8 mg/dL   Comprehensive metabolic panel    Collection Time: 04/19/22  4:44 AM   Result Value Ref Range    Sodium 142 136 - 145 mmol/L    Potassium 3.8 3.5 - 5.0 mmol/L    Chloride 106 98 - 107 mmol/L    Carbon Dioxide (CO2) 30 22 - 31 mmol/L    Anion Gap 6 5 - 18 mmol/L    Urea Nitrogen 20 8 - 28 mg/dL    Creatinine 0.94 0.60 - 1.10 mg/dL    Calcium 7.8 (L) 8.5 - 10.5 mg/dL    Glucose 86 70 - 125 mg/dL    Alkaline Phosphatase 54 45 - 120 U/L    AST 14 0 - 40 U/L    ALT 11 0 - 45 U/L    Protein Total 4.8 (L) 6.0 - 8.0 g/dL    Albumin 1.7 (L) 3.5 - 5.0 g/dL    Bilirubin Total 0.3 0.0 - 1.0 mg/dL    GFR Estimate 62 >60 mL/min/1.73m2   Magnesium    Collection Time: 04/19/22  4:44 AM   Result Value Ref Range    Magnesium 2.0 1.8 - 2.6 mg/dL   Phosphorus    Collection Time: 04/19/22  4:44 AM   Result Value Ref Range    Phosphorus 3.4 2.5 - 4.5 mg/dL   CBC with platelets and differential    Collection Time: 04/19/22  4:44 AM   Result Value Ref Range    WBC Count 18.7 (H) 4.0 - 11.0 10e3/uL    RBC Count 3.15 (L) 3.80 - 5.20 10e6/uL    Hemoglobin 8.9 (L) 11.7 - 15.7 g/dL    Hematocrit 27.5 (L) 35.0 - 47.0 %    MCV 87 78 - 100 fL    MCH 28.3 26.5 - 33.0 pg    MCHC 32.4 31.5 - 36.5 g/dL    RDW 15.0 10.0 - 15.0 %    Platelet Count 342 150 - 450 10e3/uL    % Neutrophils 79 %    % Lymphocytes 10 %    % Monocytes 10 %    % Eosinophils 0 %    % Basophils 0 %    % Immature Granulocytes 1 %    NRBCs per 100 WBC 0 <1 /100    Absolute Neutrophils 14.7 (H) 1.6 - 8.3 10e3/uL    Absolute Lymphocytes 1.8 0.8 - 5.3 10e3/uL    Absolute Monocytes 1.9 (H) 0.0 - 1.3 10e3/uL    Absolute Eosinophils 0.1 0.0 - 0.7 10e3/uL    Absolute Basophils 0.0 0.0 - 0.2 10e3/uL    Absolute Immature Granulocytes 0.2 <=0.4 10e3/uL     Absolute NRBCs 0.0 10e3/uL   Basic metabolic panel    Collection Time: 04/20/22  6:20 AM   Result Value Ref Range    Sodium 141 136 - 145 mmol/L    Potassium 4.2 3.5 - 5.0 mmol/L    Chloride 105 98 - 107 mmol/L    Carbon Dioxide (CO2) 26 22 - 31 mmol/L    Anion Gap 10 5 - 18 mmol/L    Urea Nitrogen 19 8 - 28 mg/dL    Creatinine 0.92 0.60 - 1.10 mg/dL    Calcium 8.4 (L) 8.5 - 10.5 mg/dL    Glucose 80 70 - 125 mg/dL    GFR Estimate 64 >60 mL/min/1.73m2   CRP inflammation    Collection Time: 04/20/22  6:20 AM   Result Value Ref Range    CRP 5.9 (H) 0.0 - 0.8 mg/dL   Phosphorus    Collection Time: 04/20/22  6:20 AM   Result Value Ref Range    Phosphorus 3.2 2.5 - 4.5 mg/dL   Magnesium    Collection Time: 04/20/22  6:20 AM   Result Value Ref Range    Magnesium 2.5 1.8 - 2.6 mg/dL   CBC with platelets and differential    Collection Time: 04/20/22  6:20 AM   Result Value Ref Range    WBC Count 22.1 (H) 4.0 - 11.0 10e3/uL    RBC Count 3.65 (L) 3.80 - 5.20 10e6/uL    Hemoglobin 10.4 (L) 11.7 - 15.7 g/dL    Hematocrit 33.0 (L) 35.0 - 47.0 %    MCV 90 78 - 100 fL    MCH 28.5 26.5 - 33.0 pg    MCHC 31.5 31.5 - 36.5 g/dL    RDW 15.0 10.0 - 15.0 %    Platelet Count 403 150 - 450 10e3/uL    % Neutrophils 80 %    % Lymphocytes 9 %    % Monocytes 9 %    % Eosinophils 1 %    % Basophils 0 %    % Immature Granulocytes 1 %    NRBCs per 100 WBC 0 <1 /100    Absolute Neutrophils 17.7 (H) 1.6 - 8.3 10e3/uL    Absolute Lymphocytes 1.9 0.8 - 5.3 10e3/uL    Absolute Monocytes 2.0 (H) 0.0 - 1.3 10e3/uL    Absolute Eosinophils 0.1 0.0 - 0.7 10e3/uL    Absolute Basophils 0.0 0.0 - 0.2 10e3/uL    Absolute Immature Granulocytes 0.2 <=0.4 10e3/uL    Absolute NRBCs 0.0 10e3/uL   ECG 12-LEAD WITH MUSE ([facility])    Collection Time: 04/20/22  2:29 PM   Result Value Ref Range    Systolic Blood Pressure  mmHg    Diastolic Blood Pressure  mmHg    Ventricular Rate 79 BPM    Atrial Rate 79 BPM    KS Interval 120 ms    QRS Duration 114 ms     ms    QTc  488 ms    P Axis 79 degrees    R AXIS -33 degrees    T Axis 98 degrees    Interpretation ECG       Sinus rhythm with Premature ventricular complexes  Left axis deviation  Incomplete left bundle branch block  Nonspecific ST and T wave abnormality  Prolonged QT  Abnormal ECG  When compared with ECG of 15-APR-2022 05:41,  No significant change was found  Confirmed by MAKENNA WAYNE MD LOC:[facility] ([id number]) on 4/20/2022 3:56:31 PM     Basic metabolic panel    Collection Time: 04/21/22  6:08 AM   Result Value Ref Range    Sodium 144 136 - 145 mmol/L    Potassium 3.5 3.5 - 5.0 mmol/L    Chloride 111 (H) 98 - 107 mmol/L    Carbon Dioxide (CO2) 26 22 - 31 mmol/L    Anion Gap 7 5 - 18 mmol/L    Urea Nitrogen 17 8 - 28 mg/dL    Creatinine 0.79 0.60 - 1.10 mg/dL    Calcium 7.4 (L) 8.5 - 10.5 mg/dL    Glucose 75 70 - 125 mg/dL    GFR Estimate 77 >60 mL/min/1.73m2   CRP inflammation    Collection Time: 04/21/22  6:08 AM   Result Value Ref Range    CRP 4.8 (H) 0.0 - 0.8 mg/dL   Magnesium    Collection Time: 04/21/22  6:08 AM   Result Value Ref Range    Magnesium 2.1 1.8 - 2.6 mg/dL   Phosphorus    Collection Time: 04/21/22  6:08 AM   Result Value Ref Range    Phosphorus 3.2 2.5 - 4.5 mg/dL   Procalcitonin    Collection Time: 04/21/22  6:08 AM   Result Value Ref Range    Procalcitonin 0.07 0.00 - 0.49 ng/mL   CBC with platelets and differential    Collection Time: 04/21/22  6:08 AM   Result Value Ref Range    WBC Count 13.8 (H) 4.0 - 11.0 10e3/uL    RBC Count 3.07 (L) 3.80 - 5.20 10e6/uL    Hemoglobin 8.7 (L) 11.7 - 15.7 g/dL    Hematocrit 27.3 (L) 35.0 - 47.0 %    MCV 89 78 - 100 fL    MCH 28.3 26.5 - 33.0 pg    MCHC 31.9 31.5 - 36.5 g/dL    RDW 15.2 (H) 10.0 - 15.0 %    Platelet Count 352 150 - 450 10e3/uL    % Neutrophils 77 %    % Lymphocytes 11 %    % Monocytes 10 %    % Eosinophils 1 %    % Basophils 0 %    % Immature Granulocytes 1 %    NRBCs per 100 WBC 0 <1 /100    Absolute Neutrophils 10.8 (H) 1.6 - 8.3 10e3/uL    Absolute  Lymphocytes 1.4 0.8 - 5.3 10e3/uL    Absolute Monocytes 1.3 0.0 - 1.3 10e3/uL    Absolute Eosinophils 0.1 0.0 - 0.7 10e3/uL    Absolute Basophils 0.0 0.0 - 0.2 10e3/uL    Absolute Immature Granulocytes 0.1 <=0.4 10e3/uL    Absolute NRBCs 0.0 10e3/uL   Respiratory Aerobic Bacterial Culture    Collection Time: 04/21/22  9:13 AM    Specimen: Expectorate; Sputum   Result Value Ref Range    Culture 3+ Normal john     Culture 4+ Candida albicans (A)     Gram Stain Result <10 Squamous epithelial cells/low power field     Gram Stain Result >25 PMNs/low power field     Gram Stain Result 2+ Mixed john    Histoplasma Galactomannan Antigen Quant by EIA, Urine    Collection Time: 04/22/22 12:22 AM   Result Value Ref Range    Histoplasma Galactomannan Ag Quant, Urn Not Detected ng/mL    Histoplasma Galactomannan Ag Interp, Urn Not Detected Not Detected   Basic metabolic panel    Collection Time: 04/22/22  6:46 AM   Result Value Ref Range    Sodium 145 136 - 145 mmol/L    Potassium 3.8 3.5 - 5.0 mmol/L    Chloride 108 (H) 98 - 107 mmol/L    Carbon Dioxide (CO2) 29 22 - 31 mmol/L    Anion Gap 8 5 - 18 mmol/L    Urea Nitrogen 20 8 - 28 mg/dL    Creatinine 0.86 0.60 - 1.10 mg/dL    Calcium 7.6 (L) 8.5 - 10.5 mg/dL    Glucose 76 70 - 125 mg/dL    GFR Estimate 69 >60 mL/min/1.73m2   CRP inflammation    Collection Time: 04/22/22  6:46 AM   Result Value Ref Range    CRP 5.0 (H) 0.0 - 0.8 mg/dL   CBC with platelets and differential    Collection Time: 04/22/22  6:46 AM   Result Value Ref Range    WBC Count 13.5 (H) 4.0 - 11.0 10e3/uL    RBC Count 2.92 (L) 3.80 - 5.20 10e6/uL    Hemoglobin 8.3 (L) 11.7 - 15.7 g/dL    Hematocrit 27.1 (L) 35.0 - 47.0 %    MCV 93 78 - 100 fL    MCH 28.4 26.5 - 33.0 pg    MCHC 30.6 (L) 31.5 - 36.5 g/dL    RDW 15.0 10.0 - 15.0 %    Platelet Count 338 150 - 450 10e3/uL    % Neutrophils 74 %    % Lymphocytes 13 %    % Monocytes 11 %    % Eosinophils 1 %    % Basophils 0 %    % Immature Granulocytes 1 %    NRBCs  per 100 WBC 0 <1 /100    Absolute Neutrophils 10.1 (H) 1.6 - 8.3 10e3/uL    Absolute Lymphocytes 1.7 0.8 - 5.3 10e3/uL    Absolute Monocytes 1.4 (H) 0.0 - 1.3 10e3/uL    Absolute Eosinophils 0.1 0.0 - 0.7 10e3/uL    Absolute Basophils 0.0 0.0 - 0.2 10e3/uL    Absolute Immature Granulocytes 0.1 <=0.4 10e3/uL    Absolute NRBCs 0.0 10e3/uL   Asymptomatic COVID-19 Virus (Coronavirus) by PCR Nose    Collection Time: 04/22/22  9:57 AM    Specimen: Nose; Swab   Result Value Ref Range    SARS CoV2 PCR Negative Negative   There is    MEDICATIONS:     Review of your medicines          Accurate as of April 25, 2022  4:45 PM. If you have any questions, ask your nurse or doctor.            CONTINUE these medicines which may have CHANGED, or have new prescriptions. If we are uncertain of the size of tablets/capsules you have at home, strength may be listed as something that might have changed.      Dose / Directions   Atrovent HFA 17 MCG/ACT inhaler  This may have changed:     how much to take    how to take this    when to take this  Used for: Chronic obstructive pulmonary disease, unspecified COPD type (H)  Generic drug: ipratropium      [ATROVENT HFA 17 MCG/ACTUATION INHALER] USE 2 INHALATIONS EVERY 6 HOURS  Quantity: 77.4 g  Refills: 3        CONTINUE these medicines which have NOT CHANGED      Dose / Directions   acetaminophen 500 MG tablet  Commonly known as: TYLENOL      Dose: 1,000 mg  Take 1,000 mg by mouth every 8 hours as needed for mild pain or fever  Refills: 0     albuterol (2.5 MG/3ML) 0.083% neb solution  Commonly known as: PROVENTIL  Used for: COPD exacerbation (H), Pulmonary emphysema, unspecified emphysema type (H)      Dose: 2.5 mg  Take 1 vial (2.5 mg) by nebulization every 2 hours as needed for shortness of breath / dyspnea  Quantity: 90 mL  Refills: 0     ascorbic acid 1000 MG Tabs tablet      Dose: 1,000 mg  [ASCORBIC ACID, VITAMIN C, (VITAMIN C) 1000 MG TABLET] Take 1,000 mg by mouth daily. Airborne 1 tab  daily  Refills: 0     cetirizine 10 MG tablet  Commonly known as: zyrTEC      Dose: 10 mg  Take 10 mg by mouth daily as needed for allergies  Refills: 0     chlorhexidine 0.12 % solution  Commonly known as: PERIDEX      Dose: 15 mL  [CHLORHEXIDINE (PERIDEX) 0.12 % SOLUTION] Apply 15 mL to the mouth or throat at bedtime as needed.  Refills: 0     cholecalciferol 25 MCG (1000 UT) Tabs      Dose: 1,000 Units  [CHOLECALCIFEROL, VITAMIN D3, 1,000 UNIT TABLET] Take 1,000 Units by mouth daily.  Refills: 0     EPINEPHrine 0.3 MG/0.3ML injection 2-pack  Commonly known as: ANY BX GENERIC EQUIV      Dose: 0.3 mg  Inject 0.3 mg into the muscle as needed for anaphylaxis  Refills: 0     famotidine 20 MG tablet  Commonly known as: PEPCID  Used for: COPD exacerbation (H), Pulmonary emphysema, unspecified emphysema type (H)      Dose: 20 mg  Take 1 tablet (20 mg) by mouth At Bedtime  Quantity: 30 tablet  Refills: 0     fidaxomicin 200 MG tablet  Commonly known as: DIFICID  Indication: Clostridium difficile Bacteria  Used for: C. difficile colitis      Dose: 200 mg  Take 1 tablet (200 mg) by mouth 2 times daily for 6 days  Quantity: 12 tablet  Refills: 0     fluticasone 50 MCG/ACT nasal spray  Commonly known as: FLONASE  Used for: COPD exacerbation (H), Pulmonary emphysema, unspecified emphysema type (H)      Dose: 1 spray  Spray 1 spray into both nostrils daily  Quantity: 15.8 mL  Refills: 0     Fluticasone-Umeclidin-Vilanterol 200-62.5-25 MCG/INH oral inhaler  Commonly known as: TRELEGY ELLIPTA      Dose: 1 puff  Inhale 1 puff into the lungs At Bedtime  Refills: 0     furosemide 20 MG tablet  Commonly known as: LASIX  Used for: COPD exacerbation (H), Pulmonary emphysema, unspecified emphysema type (H), Acute on chronic diastolic congestive heart failure (H)      Dose: 20 mg  Take 1 tablet (20 mg) by mouth daily  Quantity: 30 tablet  Refills: 0     * gabapentin 300 MG capsule  Commonly known as: NEURONTIN  Used for: COPD  exacerbation (H), Pulmonary emphysema, unspecified emphysema type (H)      Dose: 300 mg  Take 1 capsule (300 mg) by mouth At Bedtime  Quantity: 30 capsule  Refills: 0     * gabapentin 100 MG capsule  Commonly known as: NEURONTIN  Used for: COPD exacerbation (H), Pulmonary emphysema, unspecified emphysema type (H)      Dose: 200 mg  Take 2 capsules (200 mg) by mouth every morning  Quantity: 60 capsule  Refills: 0     * guaiFENesin 600 MG 12 hr tablet  Commonly known as: MUCINEX  Used for: COPD exacerbation (H), Pulmonary emphysema, unspecified emphysema type (H)      Dose: 1,200 mg  Take 2 tablets (1,200 mg) by mouth 2 times daily  Quantity: 60 tablet  Refills: 0     * guaiFENesin 20 mg/mL Soln solution  Commonly known as: ROBITUSSIN  Used for: COPD exacerbation (H), Pulmonary emphysema, unspecified emphysema type (H)      Dose: 10 mL  Take 10 mLs by mouth every 4 hours as needed for cough  Quantity: 118 mL  Refills: 0     ipratropium - albuterol 0.5 mg/2.5 mg/3 mL 0.5-2.5 (3) MG/3ML neb solution  Commonly known as: DUONEB  Used for: COPD exacerbation (H), Pulmonary emphysema, unspecified emphysema type (H)      Dose: 3 mL  Take 1 vial (3 mLs) by nebulization 4 times daily  Quantity: 90 mL  Refills: 0     lactobacillus rhamnosus (GG) capsule  Used for: COPD exacerbation (H), Pulmonary emphysema, unspecified emphysema type (H)      Dose: 1 capsule  Take 1 capsule by mouth 2 times daily  Quantity: 60 capsule  Refills: 0     melatonin 5 MG tablet  Used for: COPD exacerbation (H), Pulmonary emphysema, unspecified emphysema type (H)      Dose: 5 mg  Take 1 tablet (5 mg) by mouth At Bedtime  Quantity: 30 tablet  Refills: 0     menthol-zinc oxide 0.44-20.6 % Oint ointment  Commonly known as: CALMOSEPTINE  Used for: COPD exacerbation (H), Pulmonary emphysema, unspecified emphysema type (H)      Apply topically 4 times daily as needed for skin protection  Quantity: 113 g  Refills: 0     meropenem 1 g vial  Commonly known as:  MERREM  Indication: Abscess  Used for: Abscess of upper lobe of left lung with pneumonia (H)      Dose: 1 g  Inject 1,000 mg (1 g) into the vein every 12 hours for 21 days  Quantity: 42 each  Refills: 0     metoprolol succinate ER 25 MG 24 hr tablet  Commonly known as: TOPROL-XL  Used for: COPD exacerbation (H), Pulmonary emphysema, unspecified emphysema type (H), Acute on chronic diastolic congestive heart failure (H)      Dose: 75 mg  Take 3 tablets (75 mg) by mouth At Bedtime  Quantity: 90 tablet  Refills: 0     mirtazapine 15 MG tablet  Commonly known as: REMERON  Used for: COPD exacerbation (H), Pulmonary emphysema, unspecified emphysema type (H)      Dose: 15 mg  Take 1 tablet (15 mg) by mouth At Bedtime  Quantity: 30 tablet  Refills: 0     potassium chloride ER 10 MEQ CR tablet  Commonly known as: K-TAB/KLOR-CON  Used for: Hypokalemia      Dose: 10 mEq  Take 1 tablet (10 mEq) by mouth 2 times daily  Quantity: 180 tablet  Refills: 3     predniSONE 5 MG tablet  Commonly known as: DELTASONE  Used for: COPD exacerbation (H), Pulmonary emphysema, unspecified emphysema type (H)      Dose: 5 mg  Take 1 tablet (5 mg) by mouth daily for 3 days  Quantity: 3 tablet  Refills: 0     rivaroxaban ANTICOAGULANT 15 MG Tabs tablet  Commonly known as: XARELTO  Used for: History of stroke      Dose: 15 mg  Take 1 tablet (15 mg) by mouth At Bedtime  Quantity: 90 tablet  Refills: 3     simvastatin 40 MG tablet  Commonly known as: ZOCOR  Used for: Hypercholesterolemia      Dose: 40 mg  Take 1 tablet (40 mg) by mouth At Bedtime  Quantity: 90 tablet  Refills: 3     sodium chloride 0.9 % infusion      Inject into the vein continuous Use 10 ml intravenously two times a day for  flushing before and after each IV medication  administration. after IVAB  Refills: 0     tamoxifen 20 MG tablet  Commonly known as: NOLVADEX  Used for: Malignant neoplasm of central portion of left breast in female, estrogen receptor positive (H)      TAKE 1  TABLET DAILY  Quantity: 90 tablet  Refills: 3     traMADol 50 MG tablet  Commonly known as: ULTRAM  Used for: COPD exacerbation (H), Pulmonary emphysema, unspecified emphysema type (H)      Dose: 50 mg  Take 1 tablet (50 mg) by mouth 2 times daily  Quantity: 60 tablet  Refills: 0     * vancomycin 125 MG capsule  Commonly known as: VANCOCIN  Indication: Clostridium difficile Bacteria  Used for: C. difficile colitis      Dose: 125 mg  Take 1 capsule (125 mg) by mouth 4 times daily  Quantity: 56 capsule  Refills: 0     * vancomycin 125 MG capsule  Commonly known as: VANCOCIN  Indication: Clostridium difficile Bacteria  Used for: C. difficile colitis      Dose: 125 mg  Start taking on: May 5, 2022  Take 1 capsule (125 mg) by mouth 2 times daily  Quantity: 14 capsule  Refills: 0     * vancomycin 125 MG capsule  Commonly known as: VANCOCIN  Indication: Clostridium difficile Bacteria  Used for: C. difficile colitis      Dose: 125 mg  Start taking on: May 13, 2022  Take 1 capsule (125 mg) by mouth daily  Quantity: 7 capsule  Refills: 0     * vancomycin 125 MG capsule  Commonly known as: VANCOCIN  Indication: Clostridium difficile Bacteria  Used for: C. difficile colitis      Dose: 125 mg  Start taking on: May 20, 2022  Take 1 capsule (125 mg) by mouth every 48 hours  Quantity: 14 capsule  Refills: 0         * This list has 8 medication(s) that are the same as other medications prescribed for you. Read the directions carefully, and ask your doctor or other care provider to review them with you.            STOP taking    multivitamin RENAL 1 MG tablet  Stopped by: Willa Sharpe CNP               ASSESSMENT/PLAN  Encounter Diagnoses   Name Primary?     Pulmonary emphysema, unspecified emphysema type (H) Yes     Physical deconditioning      Acute on chronic diastolic congestive heart failure (H)      Essential hypertension      Pulmonary emphysema- Prednisone x3 days,Nebulizers and Atrovent  Inhaler QID, Ellipta    Physical  deconditioning PT OT    Congestive heart failure - daily weights, Lasix    Hypertension Lasix decreased in the hospital to 20 mg daily, Continue 75 mg metoprolol succinate daily     C. difficile currently on a vancomycin taper    Pain management - PRN Tramadol, PRN Tylenol, and scheduled Gabapentin     Left breast Cancer- On Tamoxifen, Follow up with Oncology    HDL anticoagulation management on Xarelto Zocor 40 mg HS    Anticoagulation management on Xarelto    Pneumonia On IV Merrem, Followed by Infectious Disease, Follow up chest CT in 2 weeks       Electronically signed by: Willa Sharpe CNP

## 2022-04-26 ENCOUNTER — TELEPHONE (OUTPATIENT)
Dept: GERIATRICS | Facility: CLINIC | Age: 77
End: 2022-04-26

## 2022-04-26 ENCOUNTER — TRANSITIONAL CARE UNIT VISIT (OUTPATIENT)
Dept: GERIATRICS | Facility: CLINIC | Age: 77
End: 2022-04-26

## 2022-04-26 VITALS
SYSTOLIC BLOOD PRESSURE: 120 MMHG | RESPIRATION RATE: 20 BRPM | HEART RATE: 97 BPM | OXYGEN SATURATION: 88 % | DIASTOLIC BLOOD PRESSURE: 64 MMHG | BODY MASS INDEX: 22.78 KG/M2 | TEMPERATURE: 96.8 F | WEIGHT: 113 LBS | HEIGHT: 59 IN

## 2022-04-26 DIAGNOSIS — I10 ESSENTIAL HYPERTENSION: ICD-10-CM

## 2022-04-26 DIAGNOSIS — J43.9 PULMONARY EMPHYSEMA, UNSPECIFIED EMPHYSEMA TYPE (H): Primary | ICD-10-CM

## 2022-04-26 DIAGNOSIS — R63.4 WEIGHT LOSS: ICD-10-CM

## 2022-04-26 DIAGNOSIS — R09.02 HYPOXIA: ICD-10-CM

## 2022-04-26 DIAGNOSIS — J85.1 ABSCESS OF UPPER LOBE OF LEFT LUNG WITH PNEUMONIA (H): ICD-10-CM

## 2022-04-26 LAB
ALBUMIN SERPL-MCNC: 2 G/DL (ref 3.5–5)
ALP SERPL-CCNC: 66 U/L (ref 45–120)
ALT SERPL W P-5'-P-CCNC: <9 U/L (ref 0–45)
ANION GAP SERPL CALCULATED.3IONS-SCNC: 8 MMOL/L (ref 5–18)
AST SERPL W P-5'-P-CCNC: 17 U/L (ref 0–40)
BASOPHILS # BLD AUTO: 0.1 10E3/UL (ref 0–0.2)
BASOPHILS NFR BLD AUTO: 1 %
BILIRUB SERPL-MCNC: 0.3 MG/DL (ref 0–1)
BUN SERPL-MCNC: 18 MG/DL (ref 8–28)
C REACTIVE PROTEIN LHE: 2.7 MG/DL (ref 0–0.8)
CALCIUM SERPL-MCNC: 8.1 MG/DL (ref 8.5–10.5)
CHLORIDE BLD-SCNC: 109 MMOL/L (ref 98–107)
CO2 SERPL-SCNC: 26 MMOL/L (ref 22–31)
CREAT SERPL-MCNC: 0.99 MG/DL (ref 0.6–1.1)
DEPRECATED MISC ALLERGEN IGE RAST QL: NORMAL
EOSINOPHIL # BLD AUTO: 0.2 10E3/UL (ref 0–0.7)
EOSINOPHIL NFR BLD AUTO: 2 %
ERYTHROCYTE [DISTWIDTH] IN BLOOD BY AUTOMATED COUNT: 14.9 % (ref 10–15)
GFR SERPL CREATININE-BSD FRML MDRD: 58 ML/MIN/1.73M2
GLUCOSE BLD-MCNC: 73 MG/DL (ref 70–125)
HCT VFR BLD AUTO: 29.9 % (ref 35–47)
HGB BLD-MCNC: 9.3 G/DL (ref 11.7–15.7)
IMM GRANULOCYTES # BLD: 0.1 10E3/UL
IMM GRANULOCYTES NFR BLD: 1 %
LYMPHOCYTES # BLD AUTO: 1.7 10E3/UL (ref 0.8–5.3)
LYMPHOCYTES NFR BLD AUTO: 15 %
MCH RBC QN AUTO: 28.9 PG (ref 26.5–33)
MCHC RBC AUTO-ENTMCNC: 31.1 G/DL (ref 31.5–36.5)
MCV RBC AUTO: 93 FL (ref 78–100)
MONOCYTES # BLD AUTO: 1.2 10E3/UL (ref 0–1.3)
MONOCYTES NFR BLD AUTO: 10 %
NEUTROPHILS # BLD AUTO: 8 10E3/UL (ref 1.6–8.3)
NEUTROPHILS NFR BLD AUTO: 71 %
NRBC # BLD AUTO: 0 10E3/UL
NRBC BLD AUTO-RTO: 0 /100
PLATELET # BLD AUTO: 295 10E3/UL (ref 150–450)
POTASSIUM BLD-SCNC: 4.3 MMOL/L (ref 3.5–5)
PROT SERPL-MCNC: 5.6 G/DL (ref 6–8)
RBC # BLD AUTO: 3.22 10E6/UL (ref 3.8–5.2)
SCANNED LAB RESULT: NORMAL
SODIUM SERPL-SCNC: 143 MMOL/L (ref 136–145)
WBC # BLD AUTO: 11.2 10E3/UL (ref 4–11)

## 2022-04-26 PROCEDURE — 36415 COLL VENOUS BLD VENIPUNCTURE: CPT | Performed by: FAMILY MEDICINE

## 2022-04-26 PROCEDURE — P9604 ONE-WAY ALLOW PRORATED TRIP: HCPCS | Performed by: FAMILY MEDICINE

## 2022-04-26 PROCEDURE — 80053 COMPREHEN METABOLIC PANEL: CPT | Performed by: FAMILY MEDICINE

## 2022-04-26 PROCEDURE — 86140 C-REACTIVE PROTEIN: CPT | Performed by: FAMILY MEDICINE

## 2022-04-26 PROCEDURE — 99305 1ST NF CARE MODERATE MDM 35: CPT | Performed by: FAMILY MEDICINE

## 2022-04-26 PROCEDURE — 85025 COMPLETE CBC W/AUTO DIFF WBC: CPT | Performed by: FAMILY MEDICINE

## 2022-04-26 NOTE — LETTER
4/26/2022        RE: Irene León  [address] [facility] [id number]        [facility] GERIATRIC [facility]       Patient Irene León  MRN: [id number]        Reason for Visit     Chief Complaint   Patient presents with     RECHECK     INITIAL       Code Status     DNR only    Assessment     Pneumonia  ZONIA Abscess/ cavitory pneumonia  COPD  Hypoxic respiratory failure on 2 L of oxygen  Acute metabolic encephalopathy/delirium  C. difficile colitis  Severe anxiety  Wt loss > 40lb  Generalized weakness    Plan     Pt is admitted to TCU for strengthening and rehab.  Patient initially admitted with pneumonia in the setting of COPD and hypoxic respiratory failure.  Unfortunately had a lengthy and prolonged course requiring pulmonary and infectious disease involvement.  Family declined bronchoscopy.  She was noted to have initially pneumonia but course complicated by development of left upper lobe cavity/abscess.  She has been discharged on IV meropenem to finish a total of 3 weeks of IV antibiotics  She will need weekly labs for monitoring.  R/c labs reviewed and show improvement  CRP 2.7  Wbc 11.2  Also because of C. difficile infection she is on a vancomycin titer over the next 2 months  Given oral fidaoxomicin for a 10-day course  Monitor for ongoing colitis and diarrhea.improvement noted  Course complicated also by encephalopathy.  Vesicare and Myrbetriq were held in the hospital.  Significant improvement noted and she is more alert  R/c SLUMS 27/30 in tcu  She was noted to have severe anxiety due to which Remeron has been started at 15 mg at bedtime.  She reports an improvement in apetite from med  Has lymphedema which is chronic   Cont her low dose lasix  Palliative care was involved in goals of care discussion done patient has been made a DNR/DNI in the hospital  Discharge to the TCU for strengthening and rehab  Recheck labs  Continue with PT/OT-reports marked fatigue and limited endurance    History      Patient is a very pleasant 77 year old female who is admitted to TCU  Patient was admitted in the hospital with underlying history of COPD with pneumonia.  Her hospitalization was complicated by development of worsening respiratory status.  Repeat CT shows development of left upper lobe cavitation consideration was given for an abscess and pulmonary and infectious disease were involved in her care.  She was given broad-spectrum antibiotics  Patient declined bronchoscopy  Course complicated by C. difficile infection for which she was also treated  At baseline has hypoxic respiratory failure and continues with 2 L of oxygen  She also has COPD and will continue with the nebulizer treatments for that she continues to have shortness of breath  Patient also developed confusion with delirium in the hospital.  Medication adjustment done Vesicare and Myrbetriq were held      Past Medical & Surgical History     PAST MEDICAL HISTORY:   Past Medical History:   Diagnosis Date     ANATOLIY (acute kidney injury) (H)      Allergic rhinitis      Arrhythmia      Atrial fibrillation with RVR (H)      Bacteremia      Breast cancer (H) 2017     Cardiomyopathy (H)      Centrilobular emphysema (H)      CHF (congestive heart failure) (H)      CKD (chronic kidney disease)      COPD (chronic obstructive pulmonary disease) (H)      Coronary artery disease      Depression      Dysphagia      E. coli sepsis (H)      Factor 5 Leiden mutation, heterozygous (H)      GERD (gastroesophageal reflux disease)      Hx of radiation therapy 2017     Hyperlipidemia      Hypertension      Hypokalemia      Hypomagnesemia      Idiopathic cardiomyopathy (H)      Left hip pain 4/30/2014     OAB (overactive bladder)      Osteoporosis      Sacral insufficiency fracture      Sinusitis      Syncope      Urge incontinence      Vocal cord dysfunction       PAST SURGICAL HISTORY:   has a past surgical history that includes IR Abdominal Aortogram (4/16/2003); IR  Miscellaneous Procedure (4/16/2003); IR Aortic Arch 4 Vessel Angiogram (4/16/2003); Arthroplasty revision hip (Left); OPEN TX FEMORAL FRACTURE DISTAL MED/LAT CONDYLE (Left, 10/28/2015); Cardiac catheterization; Cataract Extraction (Left, 07/18/2017); Biopsy breast (Right, 2017); Bladder surgery; Lumpectomy breast (Left, 06/2017); PICC/Midline Placement (4/7/2022); and PICC/Midline Placement (4/11/2022).      Past Social History     Reviewed,  reports that she quit smoking about 14 years ago. She quit smokeless tobacco use about 17 years ago. She reports that she does not drink alcohol and does not use drugs.    Family History     Reviewed, and family history includes Breast Cancer in her maternal aunt; Osteoporosis in an other family member; Prostate Cancer in her brother and maternal uncle.    Medication List   Post Discharge Medication Reconciliation Status: Post Discharge Medication Reconciliation Status: discharge medications reconciled, continue medications without change.  Current Outpatient Medications   Medication     acetaminophen (TYLENOL) 500 MG tablet     albuterol (PROVENTIL) (2.5 MG/3ML) 0.083% neb solution     ascorbic acid, vitamin C, (VITAMIN C) 1000 MG tablet     cetirizine (ZYRTEC) 10 MG tablet     chlorhexidine (PERIDEX) 0.12 % solution     cholecalciferol, vitamin D3, 1,000 unit tablet     EPINEPHrine (EPIPEN/ADRENACLICK/AUVI-Q) 0.3 mg/0.3 mL injection     famotidine (PEPCID) 20 MG tablet     fidaxomicin (DIFICID) 200 MG tablet     fluticasone (FLONASE) 50 MCG/ACT nasal spray     Fluticasone-Umeclidin-Vilanterol (TRELEGY ELLIPTA) 200-62.5-25 MCG/INH oral inhaler     furosemide (LASIX) 20 MG tablet     gabapentin (NEURONTIN) 100 MG capsule     gabapentin (NEURONTIN) 300 MG capsule     guaiFENesin (MUCINEX) 600 MG 12 hr tablet     guaiFENesin (ROBITUSSIN) 20 mg/mL SOLN solution     ipratropium (ATROVENT HFA) 17 MCG/ACT inhaler     ipratropium - albuterol 0.5 mg/2.5 mg/3 mL (DUONEB) 0.5-2.5 (3)  MG/3ML neb solution     lactobacillus rhamnosus, GG, (CULTURELL) capsule     melatonin 5 MG tablet     menthol-zinc oxide (CALMOSEPTINE) 0.44-20.6 % OINT ointment     meropenem (MERREM) 1 g vial     metoprolol succinate ER (TOPROL-XL) 25 MG 24 hr tablet     mirtazapine (REMERON) 15 MG tablet     potassium chloride ER (K-TAB/KLOR-CON) 10 MEQ CR tablet     rivaroxaban ANTICOAGULANT (XARELTO) 15 MG TABS tablet     simvastatin (ZOCOR) 40 MG tablet     SODIUM CHLORIDE 0.9 % IV SOLN     tamoxifen (NOLVADEX) 20 MG tablet     traMADol (ULTRAM) 50 MG tablet     vancomycin (VANCOCIN) 125 MG capsule     [START ON 5/5/2022] vancomycin (VANCOCIN) 125 MG capsule     [START ON 5/13/2022] vancomycin (VANCOCIN) 125 MG capsule     [START ON 5/20/2022] vancomycin (VANCOCIN) 125 MG capsule     No current facility-administered medications for this visit.          Allergies     Allergies   Allergen Reactions     Sulfa (Sulfonamide Antibiotics) [Sulfa Drugs] Rash     Headaches and dizziness.     Homeopathic Drugs [External Allergen Needs Reconciliation - See Comment] Unknown and Other (See Comments)     Comment: runny nose, Comment: runny nose     Mold Extracts [Molds & Smuts] Unknown     Morphine (Pf) [Morphine] Unknown     hallucinate     Lisinopril Cough, Unknown and Itching     Comment: cough, Comment: cough     Sulfacetamide Sodium [Sulfacetamide] Rash       Review of Systems   A comprehensive review of 14 systems was done. Pertinent findings noted here and in history of present illness. All the rest negative.  Constitutional: Negative.  Negative for fever, chills, she has  activity change, appetite change and fatigue.   But she had profound anorexia.  She reports a weight loss of more than 40 pounds in the last few weeks.  She does not feel like eating and continues to lose weight but now she states that she has gained a few pounds  HENT: Negative for congestion and facial swelling.    Eyes: Negative for photophobia, redness and  "visual disturbance.   Respiratory: Negative for  chest tightness.  Has cough with some expectoration  Cardiovascular: Negative for chest pain, palpitations and has chronic leg swelling.   Gastrointestinal: Negative for nausea, diarrhea, constipation, blood in stool and abdominal distention.   Genitourinary: Negative.    Musculoskeletal: Has chronic back issues due to compression fractures in the past  Skin: Negative.    Neurological: Negative for dizziness, tremors, syncope, weakness, light-headedness and headaches.   Endurance is limited and cannot stand for more than 10 minutes  Hematological: Does not bruise/bleed easily.   Psychiatric/Behavioral: Negative.  Mood/anxiety is better      Physical Exam   /64   Pulse 97   Temp 96.8  F (36  C)   Resp 20   Ht 1.499 m (4' 11\")   Wt 51.3 kg (113 lb)   SpO2 (!) 88%   BMI 22.82 kg/m     On 3 L oxygen  Constitutional: Oriented to person, place, and time and appears well-developed.   HEENT:  Normocephalic and atraumatic.  Eyes: Conjunctivae and EOM are normal. Pupils are equal, round, and reactive to light. No discharge.  No scleral icterus. Nose normal. Mouth/Throat: Oropharynx is clear and moist. No oropharyngeal exudate.    NECK: Normal range of motion. Neck supple. No JVD present. No tracheal deviation present. No thyromegaly present.   CARDIOVASCULAR: Normal rate, regular rhythm and intact distal pulses.  Exam reveals no gallop and no friction rub.  Systolic murmur present.  PULMONARY: Effort normal and breath sounds normal. No respiratory distress.No Wheezing or rales.  ABDOMEN: Soft. Bowel sounds are normal. No distension and no mass.  There is no tenderness. There is no rebound and no guarding. No HSM.  MUSCULOSKELETAL: Normal range of motion. no tenderness. Mild kyphosis, no tenderness.  LYMPH NODES: Has no cervical, supraclavicular, axillary and groin adenopathy.   NEUROLOGICAL: Alert and oriented to person, place, and time. No cranial nerve deficit.  " Normal muscle tone. Coordination normal.   GENITOURINARY: Deferred exam.  SKIN: Skin is warm and dry. No rash noted. No erythema. No pallor.   Stasis dermatitis of both legs with edema and presence of scaling of skin and hyperpigmentary changes which are chronic  EXTREMITIES: No cyanosis, no clubbing, has 1+ lower extremity edema. No Deformity.  PSYCHIATRIC: Normal mood, affect and behavior.      Lab Results     Recent Results (from the past 240 hour(s))   Respiratory Aerobic Bacterial Culture with Gram Stain    Collection Time: 04/16/22  2:39 PM    Specimen: Expectorate; Sputum   Result Value Ref Range    Culture       >10 Squamous epithelial cells/low power field indicates oral contamination. Please recollect.    Gram Stain Result >10 Squamous epithelial cells/low power field     Gram Stain Result <25 PMNs/low power field     Gram Stain Result 3+ Mixed john    Nocardia species culture    Collection Time: 04/16/22  2:39 PM    Specimen: Expectorate; Sputum   Result Value Ref Range    Culture No growth after 9 days    Acid-Fast Bacilli Culture and Stain with AFB Stain    Collection Time: 04/16/22  2:39 PM    Specimen: Buccal (not smear); Sputum   Result Value Ref Range    Acid Fast Stain No acid fast bacilli seen     Acid Fast Stain No acid fast bacilli seen    Histoplasma Galactomannan Antigen Quant by EIA, Urine    Collection Time: 04/16/22  8:21 PM   Result Value Ref Range    Histoplasma Galactomannan Ag Quant, Urn Not Detected ng/mL    Histoplasma Galactomannan Ag Interp, Urn Not Detected Not Detected   Basic metabolic panel    Collection Time: 04/17/22  4:55 AM   Result Value Ref Range    Sodium 141 136 - 145 mmol/L    Potassium 4.4 3.5 - 5.0 mmol/L    Chloride 104 98 - 107 mmol/L    Carbon Dioxide (CO2) 28 22 - 31 mmol/L    Anion Gap 9 5 - 18 mmol/L    Urea Nitrogen 20 8 - 28 mg/dL    Creatinine 0.94 0.60 - 1.10 mg/dL    Calcium 8.2 (L) 8.5 - 10.5 mg/dL    Glucose 71 70 - 125 mg/dL    GFR Estimate 62 >60  mL/min/1.73m2   CRP inflammation    Collection Time: 04/17/22  4:55 AM   Result Value Ref Range    CRP 7.0 (H) 0.0 - 0.8 mg/dL   Magnesium    Collection Time: 04/17/22  4:55 AM   Result Value Ref Range    Magnesium 2.1 1.8 - 2.6 mg/dL   Phosphorus    Collection Time: 04/17/22  4:55 AM   Result Value Ref Range    Phosphorus 3.4 2.5 - 4.5 mg/dL   CBC with platelets and differential    Collection Time: 04/17/22  4:55 AM   Result Value Ref Range    WBC Count 23.4 (H) 4.0 - 11.0 10e3/uL    RBC Count 3.97 3.80 - 5.20 10e6/uL    Hemoglobin 11.2 (L) 11.7 - 15.7 g/dL    Hematocrit 34.4 (L) 35.0 - 47.0 %    MCV 87 78 - 100 fL    MCH 28.2 26.5 - 33.0 pg    MCHC 32.6 31.5 - 36.5 g/dL    RDW 14.8 10.0 - 15.0 %    Platelet Count 418 150 - 450 10e3/uL    % Neutrophils 81 %    % Lymphocytes 9 %    % Monocytes 8 %    % Eosinophils 0 %    % Basophils 0 %    % Immature Granulocytes 2 %    NRBCs per 100 WBC 0 <1 /100    Absolute Neutrophils 18.8 (H) 1.6 - 8.3 10e3/uL    Absolute Lymphocytes 2.2 0.8 - 5.3 10e3/uL    Absolute Monocytes 1.9 (H) 0.0 - 1.3 10e3/uL    Absolute Eosinophils 0.1 0.0 - 0.7 10e3/uL    Absolute Basophils 0.0 0.0 - 0.2 10e3/uL    Absolute Immature Granulocytes 0.4 <=0.4 10e3/uL    Absolute NRBCs 0.0 10e3/uL   Lactic Acid STAT    Collection Time: 04/17/22  4:55 AM   Result Value Ref Range    Lactic Acid 1.1 0.7 - 2.0 mmol/L   Acid-Fast Bacilli Culture and Stain with AFB Stain    Collection Time: 04/17/22  5:00 AM    Specimen: Mouth; Sputum   Result Value Ref Range    Acid Fast Stain No acid fast bacilli seen     Acid Fast Stain No acid fast bacilli seen    Basic metabolic panel    Collection Time: 04/18/22  7:41 AM   Result Value Ref Range    Sodium 142 136 - 145 mmol/L    Potassium 3.6 3.5 - 5.0 mmol/L    Chloride 105 98 - 107 mmol/L    Carbon Dioxide (CO2) 28 22 - 31 mmol/L    Anion Gap 9 5 - 18 mmol/L    Urea Nitrogen 20 8 - 28 mg/dL    Creatinine 0.91 0.60 - 1.10 mg/dL    Calcium 8.0 (L) 8.5 - 10.5 mg/dL     Glucose 83 70 - 125 mg/dL    GFR Estimate 65 >60 mL/min/1.73m2   CRP inflammation    Collection Time: 04/18/22  7:41 AM   Result Value Ref Range    CRP 7.0 (H) 0.0 - 0.8 mg/dL   Magnesium    Collection Time: 04/18/22  7:41 AM   Result Value Ref Range    Magnesium 1.8 1.8 - 2.6 mg/dL   Phosphorus    Collection Time: 04/18/22  7:41 AM   Result Value Ref Range    Phosphorus 2.6 2.5 - 4.5 mg/dL   CBC with platelets and differential    Collection Time: 04/18/22  7:41 AM   Result Value Ref Range    WBC Count 31.4 (H) 4.0 - 11.0 10e3/uL    RBC Count 3.65 (L) 3.80 - 5.20 10e6/uL    Hemoglobin 10.2 (L) 11.7 - 15.7 g/dL    Hematocrit 31.4 (L) 35.0 - 47.0 %    MCV 86 78 - 100 fL    MCH 27.9 26.5 - 33.0 pg    MCHC 32.5 31.5 - 36.5 g/dL    RDW 14.8 10.0 - 15.0 %    Platelet Count 414 150 - 450 10e3/uL    % Neutrophils 90 %    % Lymphocytes 3 %    % Monocytes 6 %    % Eosinophils 0 %    % Basophils 0 %    % Immature Granulocytes 1 %    NRBCs per 100 WBC 0 <1 /100    Absolute Neutrophils 28.2 (H) 1.6 - 8.3 10e3/uL    Absolute Lymphocytes 0.9 0.8 - 5.3 10e3/uL    Absolute Monocytes 1.9 (H) 0.0 - 1.3 10e3/uL    Absolute Eosinophils 0.0 0.0 - 0.7 10e3/uL    Absolute Basophils 0.1 0.0 - 0.2 10e3/uL    Absolute Immature Granulocytes 0.4 <=0.4 10e3/uL    Absolute NRBCs 0.0 10e3/uL   Extra Green Top (Lithium Heparin) Tube    Collection Time: 04/18/22  7:41 AM   Result Value Ref Range    Hold Specimen [facility]    Histoplasma Capsulatum Antigen    Collection Time: 04/18/22  7:41 AM   Result Value Ref Range    See Scanned Result HISTOPLASMA CAPSULATUM ANTIGEN-Scanned    Lactic acid whole blood    Collection Time: 04/18/22  9:41 AM   Result Value Ref Range    Lactic Acid 2.8 (H) 0.7 - 2.0 mmol/L   Acid-Fast Bacilli Culture and Stain with AFB Stain    Collection Time: 04/18/22  9:41 AM    Specimen: Other; Sputum   Result Value Ref Range    Acid Fast Stain No acid fast bacilli seen     Acid Fast Stain No acid fast bacilli seen    Blood Culture  Peripheral Blood    Collection Time: 04/18/22 11:53 AM    Specimen: Peripheral Blood   Result Value Ref Range    Culture No Growth    Blood Culture Peripheral Blood    Collection Time: 04/18/22 12:50 PM    Specimen: Peripheral Blood   Result Value Ref Range    Culture No Growth    Blastomyces antibody ID    Collection Time: 04/18/22 11:22 PM   Result Value Ref Range    Blastomyces dermatitidis Abs, Precipitin None Detected None Detected   Blastomyces Agn Quant EIA Blood    Collection Time: 04/18/22 11:22 PM    Specimen: Central Venous Line; Blood   Result Value Ref Range    See Scanned Result BLASTOMYCES AGN QUANT EIA BLOOD-Scanned    Fungal Antibodies    Collection Time: 04/18/22 11:22 PM   Result Value Ref Range    Blastomyces Moira by EIA 0.3 <=0.9 IV    Aspergillus Antibody by CF <1:8 <1:8    Coccidioides Antibody by CF <1:2 <1:2    Histoplasma Mycelia, CF <1:8 <1:8    Histoplasma Yeast, CF <1:8 <1:8   Histoplasma Moira by Immunodiffusion    Collection Time: 04/18/22 11:22 PM    Specimen: Central Venous Line; Blood   Result Value Ref Range    See Scanned Result HISTOPLASMA ANTIBODY BY IMMUNODIFFUSION-Scanned    Histoplasma Antigen Quantitative by EIA    Collection Time: 04/18/22 11:22 PM   Result Value Ref Range    Histoplasma Antigen, Serum Not Detected Not Detected    Histoplasma Antigen, Serum Interp Not Detected Not Detected   Aspergillus antibody    Collection Time: 04/18/22 11:22 PM   Result Value Ref Range    Aspergillus spp. Abs, Precipitin None Detected None Detected   Aspergillus Galactomannan Antigen    Collection Time: 04/18/22 11:22 PM    Specimen: Line, venous; Blood   Result Value Ref Range    Aspergillus Galactomannan Index 0.04     Aspergillus Galact AG Negative Negative   1,3 Beta D glucan fungitell    Collection Time: 04/18/22 11:22 PM   Result Value Ref Range    (1,3)-Beta-D-Glucan <31 pg/mL    B-D GLUCAN INTERPRETATION (1,3) Negative Negative   CRP inflammation    Collection Time: 04/19/22  4:44  AM   Result Value Ref Range    CRP 5.7 (H) 0.0 - 0.8 mg/dL   Comprehensive metabolic panel    Collection Time: 04/19/22  4:44 AM   Result Value Ref Range    Sodium 142 136 - 145 mmol/L    Potassium 3.8 3.5 - 5.0 mmol/L    Chloride 106 98 - 107 mmol/L    Carbon Dioxide (CO2) 30 22 - 31 mmol/L    Anion Gap 6 5 - 18 mmol/L    Urea Nitrogen 20 8 - 28 mg/dL    Creatinine 0.94 0.60 - 1.10 mg/dL    Calcium 7.8 (L) 8.5 - 10.5 mg/dL    Glucose 86 70 - 125 mg/dL    Alkaline Phosphatase 54 45 - 120 U/L    AST 14 0 - 40 U/L    ALT 11 0 - 45 U/L    Protein Total 4.8 (L) 6.0 - 8.0 g/dL    Albumin 1.7 (L) 3.5 - 5.0 g/dL    Bilirubin Total 0.3 0.0 - 1.0 mg/dL    GFR Estimate 62 >60 mL/min/1.73m2   Magnesium    Collection Time: 04/19/22  4:44 AM   Result Value Ref Range    Magnesium 2.0 1.8 - 2.6 mg/dL   Phosphorus    Collection Time: 04/19/22  4:44 AM   Result Value Ref Range    Phosphorus 3.4 2.5 - 4.5 mg/dL   CBC with platelets and differential    Collection Time: 04/19/22  4:44 AM   Result Value Ref Range    WBC Count 18.7 (H) 4.0 - 11.0 10e3/uL    RBC Count 3.15 (L) 3.80 - 5.20 10e6/uL    Hemoglobin 8.9 (L) 11.7 - 15.7 g/dL    Hematocrit 27.5 (L) 35.0 - 47.0 %    MCV 87 78 - 100 fL    MCH 28.3 26.5 - 33.0 pg    MCHC 32.4 31.5 - 36.5 g/dL    RDW 15.0 10.0 - 15.0 %    Platelet Count 342 150 - 450 10e3/uL    % Neutrophils 79 %    % Lymphocytes 10 %    % Monocytes 10 %    % Eosinophils 0 %    % Basophils 0 %    % Immature Granulocytes 1 %    NRBCs per 100 WBC 0 <1 /100    Absolute Neutrophils 14.7 (H) 1.6 - 8.3 10e3/uL    Absolute Lymphocytes 1.8 0.8 - 5.3 10e3/uL    Absolute Monocytes 1.9 (H) 0.0 - 1.3 10e3/uL    Absolute Eosinophils 0.1 0.0 - 0.7 10e3/uL    Absolute Basophils 0.0 0.0 - 0.2 10e3/uL    Absolute Immature Granulocytes 0.2 <=0.4 10e3/uL    Absolute NRBCs 0.0 10e3/uL   Basic metabolic panel    Collection Time: 04/20/22  6:20 AM   Result Value Ref Range    Sodium 141 136 - 145 mmol/L    Potassium 4.2 3.5 - 5.0 mmol/L     Chloride 105 98 - 107 mmol/L    Carbon Dioxide (CO2) 26 22 - 31 mmol/L    Anion Gap 10 5 - 18 mmol/L    Urea Nitrogen 19 8 - 28 mg/dL    Creatinine 0.92 0.60 - 1.10 mg/dL    Calcium 8.4 (L) 8.5 - 10.5 mg/dL    Glucose 80 70 - 125 mg/dL    GFR Estimate 64 >60 mL/min/1.73m2   CRP inflammation    Collection Time: 04/20/22  6:20 AM   Result Value Ref Range    CRP 5.9 (H) 0.0 - 0.8 mg/dL   Phosphorus    Collection Time: 04/20/22  6:20 AM   Result Value Ref Range    Phosphorus 3.2 2.5 - 4.5 mg/dL   Magnesium    Collection Time: 04/20/22  6:20 AM   Result Value Ref Range    Magnesium 2.5 1.8 - 2.6 mg/dL   CBC with platelets and differential    Collection Time: 04/20/22  6:20 AM   Result Value Ref Range    WBC Count 22.1 (H) 4.0 - 11.0 10e3/uL    RBC Count 3.65 (L) 3.80 - 5.20 10e6/uL    Hemoglobin 10.4 (L) 11.7 - 15.7 g/dL    Hematocrit 33.0 (L) 35.0 - 47.0 %    MCV 90 78 - 100 fL    MCH 28.5 26.5 - 33.0 pg    MCHC 31.5 31.5 - 36.5 g/dL    RDW 15.0 10.0 - 15.0 %    Platelet Count 403 150 - 450 10e3/uL    % Neutrophils 80 %    % Lymphocytes 9 %    % Monocytes 9 %    % Eosinophils 1 %    % Basophils 0 %    % Immature Granulocytes 1 %    NRBCs per 100 WBC 0 <1 /100    Absolute Neutrophils 17.7 (H) 1.6 - 8.3 10e3/uL    Absolute Lymphocytes 1.9 0.8 - 5.3 10e3/uL    Absolute Monocytes 2.0 (H) 0.0 - 1.3 10e3/uL    Absolute Eosinophils 0.1 0.0 - 0.7 10e3/uL    Absolute Basophils 0.0 0.0 - 0.2 10e3/uL    Absolute Immature Granulocytes 0.2 <=0.4 10e3/uL    Absolute NRBCs 0.0 10e3/uL   ECG 12-LEAD WITH MUSE (L[facility])    Collection Time: 04/20/22  2:29 PM   Result Value Ref Range    Systolic Blood Pressure  mmHg    Diastolic Blood Pressure  mmHg    Ventricular Rate 79 BPM    Atrial Rate 79 BPM    IL Interval 120 ms    QRS Duration 114 ms     ms    QTc 488 ms    P Axis 79 degrees    R AXIS -33 degrees    T Axis 98 degrees    Interpretation ECG       Sinus rhythm with Premature ventricular complexes  Left axis deviation  Incomplete  left bundle branch block  Nonspecific ST and T wave abnormality  Prolonged QT  Abnormal ECG  When compared with ECG of 15-APR-2022 05:41,  No significant change was found  Confirmed by MAKENNA WAYNE MD LOC:[facility] ([id number]) on 4/20/2022 3:56:31 PM     Basic metabolic panel    Collection Time: 04/21/22  6:08 AM   Result Value Ref Range    Sodium 144 136 - 145 mmol/L    Potassium 3.5 3.5 - 5.0 mmol/L    Chloride 111 (H) 98 - 107 mmol/L    Carbon Dioxide (CO2) 26 22 - 31 mmol/L    Anion Gap 7 5 - 18 mmol/L    Urea Nitrogen 17 8 - 28 mg/dL    Creatinine 0.79 0.60 - 1.10 mg/dL    Calcium 7.4 (L) 8.5 - 10.5 mg/dL    Glucose 75 70 - 125 mg/dL    GFR Estimate 77 >60 mL/min/1.73m2   CRP inflammation    Collection Time: 04/21/22  6:08 AM   Result Value Ref Range    CRP 4.8 (H) 0.0 - 0.8 mg/dL   Magnesium    Collection Time: 04/21/22  6:08 AM   Result Value Ref Range    Magnesium 2.1 1.8 - 2.6 mg/dL   Phosphorus    Collection Time: 04/21/22  6:08 AM   Result Value Ref Range    Phosphorus 3.2 2.5 - 4.5 mg/dL   Procalcitonin    Collection Time: 04/21/22  6:08 AM   Result Value Ref Range    Procalcitonin 0.07 0.00 - 0.49 ng/mL   CBC with platelets and differential    Collection Time: 04/21/22  6:08 AM   Result Value Ref Range    WBC Count 13.8 (H) 4.0 - 11.0 10e3/uL    RBC Count 3.07 (L) 3.80 - 5.20 10e6/uL    Hemoglobin 8.7 (L) 11.7 - 15.7 g/dL    Hematocrit 27.3 (L) 35.0 - 47.0 %    MCV 89 78 - 100 fL    MCH 28.3 26.5 - 33.0 pg    MCHC 31.9 31.5 - 36.5 g/dL    RDW 15.2 (H) 10.0 - 15.0 %    Platelet Count 352 150 - 450 10e3/uL    % Neutrophils 77 %    % Lymphocytes 11 %    % Monocytes 10 %    % Eosinophils 1 %    % Basophils 0 %    % Immature Granulocytes 1 %    NRBCs per 100 WBC 0 <1 /100    Absolute Neutrophils 10.8 (H) 1.6 - 8.3 10e3/uL    Absolute Lymphocytes 1.4 0.8 - 5.3 10e3/uL    Absolute Monocytes 1.3 0.0 - 1.3 10e3/uL    Absolute Eosinophils 0.1 0.0 - 0.7 10e3/uL    Absolute Basophils 0.0 0.0 - 0.2 10e3/uL    Absolute Immature  Granulocytes 0.1 <=0.4 10e3/uL    Absolute NRBCs 0.0 10e3/uL   Respiratory Aerobic Bacterial Culture    Collection Time: 04/21/22  9:13 AM    Specimen: Expectorate; Sputum   Result Value Ref Range    Culture 3+ Normal john     Culture 4+ Candida albicans (A)     Gram Stain Result <10 Squamous epithelial cells/low power field     Gram Stain Result >25 PMNs/low power field     Gram Stain Result 2+ Mixed john    Histoplasma Galactomannan Antigen Quant by EIA, Urine    Collection Time: 04/22/22 12:22 AM   Result Value Ref Range    Histoplasma Galactomannan Ag Quant, Urn Not Detected ng/mL    Histoplasma Galactomannan Ag Interp, Urn Not Detected Not Detected   Basic metabolic panel    Collection Time: 04/22/22  6:46 AM   Result Value Ref Range    Sodium 145 136 - 145 mmol/L    Potassium 3.8 3.5 - 5.0 mmol/L    Chloride 108 (H) 98 - 107 mmol/L    Carbon Dioxide (CO2) 29 22 - 31 mmol/L    Anion Gap 8 5 - 18 mmol/L    Urea Nitrogen 20 8 - 28 mg/dL    Creatinine 0.86 0.60 - 1.10 mg/dL    Calcium 7.6 (L) 8.5 - 10.5 mg/dL    Glucose 76 70 - 125 mg/dL    GFR Estimate 69 >60 mL/min/1.73m2   CRP inflammation    Collection Time: 04/22/22  6:46 AM   Result Value Ref Range    CRP 5.0 (H) 0.0 - 0.8 mg/dL   CBC with platelets and differential    Collection Time: 04/22/22  6:46 AM   Result Value Ref Range    WBC Count 13.5 (H) 4.0 - 11.0 10e3/uL    RBC Count 2.92 (L) 3.80 - 5.20 10e6/uL    Hemoglobin 8.3 (L) 11.7 - 15.7 g/dL    Hematocrit 27.1 (L) 35.0 - 47.0 %    MCV 93 78 - 100 fL    MCH 28.4 26.5 - 33.0 pg    MCHC 30.6 (L) 31.5 - 36.5 g/dL    RDW 15.0 10.0 - 15.0 %    Platelet Count 338 150 - 450 10e3/uL    % Neutrophils 74 %    % Lymphocytes 13 %    % Monocytes 11 %    % Eosinophils 1 %    % Basophils 0 %    % Immature Granulocytes 1 %    NRBCs per 100 WBC 0 <1 /100    Absolute Neutrophils 10.1 (H) 1.6 - 8.3 10e3/uL    Absolute Lymphocytes 1.7 0.8 - 5.3 10e3/uL    Absolute Monocytes 1.4 (H) 0.0 - 1.3 10e3/uL    Absolute  Eosinophils 0.1 0.0 - 0.7 10e3/uL    Absolute Basophils 0.0 0.0 - 0.2 10e3/uL    Absolute Immature Granulocytes 0.1 <=0.4 10e3/uL    Absolute NRBCs 0.0 10e3/uL   Asymptomatic COVID-19 Virus (Coronavirus) by PCR Nose    Collection Time: 04/22/22  9:57 AM    Specimen: Nose; Swab   Result Value Ref Range    SARS CoV2 PCR Negative Negative   Comprehensive metabolic panel    Collection Time: 04/26/22  8:34 AM   Result Value Ref Range    Sodium 143 136 - 145 mmol/L    Potassium 4.3 3.5 - 5.0 mmol/L    Chloride 109 (H) 98 - 107 mmol/L    Carbon Dioxide (CO2) 26 22 - 31 mmol/L    Anion Gap 8 5 - 18 mmol/L    Urea Nitrogen 18 8 - 28 mg/dL    Creatinine 0.99 0.60 - 1.10 mg/dL    Calcium 8.1 (L) 8.5 - 10.5 mg/dL    Glucose 73 70 - 125 mg/dL    Alkaline Phosphatase 66 45 - 120 U/L    AST 17 0 - 40 U/L    ALT <9 0 - 45 U/L    Protein Total 5.6 (L) 6.0 - 8.0 g/dL    Albumin 2.0 (L) 3.5 - 5.0 g/dL    Bilirubin Total 0.3 0.0 - 1.0 mg/dL    GFR Estimate 58 (L) >60 mL/min/1.73m2   CRP inflammation    Collection Time: 04/26/22  8:34 AM   Result Value Ref Range    CRP 2.7 (H) 0.0 - 0.8 mg/dL   CBC with platelets and differential    Collection Time: 04/26/22  8:34 AM   Result Value Ref Range    WBC Count 11.2 (H) 4.0 - 11.0 10e3/uL    RBC Count 3.22 (L) 3.80 - 5.20 10e6/uL    Hemoglobin 9.3 (L) 11.7 - 15.7 g/dL    Hematocrit 29.9 (L) 35.0 - 47.0 %    MCV 93 78 - 100 fL    MCH 28.9 26.5 - 33.0 pg    MCHC 31.1 (L) 31.5 - 36.5 g/dL    RDW 14.9 10.0 - 15.0 %    Platelet Count 295 150 - 450 10e3/uL    % Neutrophils 71 %    % Lymphocytes 15 %    % Monocytes 10 %    % Eosinophils 2 %    % Basophils 1 %    % Immature Granulocytes 1 %    NRBCs per 100 WBC 0 <1 /100    Absolute Neutrophils 8.0 1.6 - 8.3 10e3/uL    Absolute Lymphocytes 1.7 0.8 - 5.3 10e3/uL    Absolute Monocytes 1.2 0.0 - 1.3 10e3/uL    Absolute Eosinophils 0.2 0.0 - 0.7 10e3/uL    Absolute Basophils 0.1 0.0 - 0.2 10e3/uL    Absolute Immature Granulocytes 0.1 <=0.4 10e3/uL     Absolute NRBCs 0.0 10e3/uL             Imaging Results     NM Lung Scan Perfusion Particulate    Result Date: 2022  EXAM: NM LUNG SCAN PERFUSION PARTICULATE LOCATION: [facility] DATE/TIME: 2022 5:18 PM INDICATION: PE suspected, high prob COMPARISON: Chest x-ray 2022, V/Q scan 2020, CT chest exam 2019 TECHNIQUE: 7.9 mCi technetium-99m MAA, IV. Standard lung perfusion imaging. FINDINGS: Heterogeneous perfusion throughout the left lung with numerous small subsegmental perfusion defects similar to the previous VQ scan. Perfusion to the right lung is more homogeneous, with a few subtle areas of decreased activity also similar to the prior exam. Chest x-ray from today demonstrates emphysema with diffuse opacities in the left upper and mid left lung suggestive of pneumonia.     IMPRESSION: 1.  Chronic perfusion abnormalities throughout both lungs similar to the previous exam. No new perfusion abnormalities. Recent chest x-ray is suggestive of left mid and upper lung pneumonia.    Echocardiogram Complete    Result Date: 2022  [id number]^TEREZA^GERALD^[place] [address]  Name: JUDY ZARCO MRN: [id number] : 1945 Study Date: 2022 02:58 PM Age: 77 yrs Gender: Female Patient Location: [facility] Reason For Study: Syncope Ordering Physician: GERALD STARKS Performed By: SUGEY  BSA: 1.4 m2 Height: 59 in Weight: 112 lb ______________________________________________________________________________ Procedure Complete Portable Echo Adult. ______________________________________________________________________________ Interpretation Summary  1. The left ventricle is normal in size. Left ventricular systolic performance is moderately reduced. The ejection fraction is estimated to be 35-40%. 2. There is moderate global reduction in left ventricular systolic performance. 3. There is abnormal septal motion  possibly related to altered electrical activation due to bundle branch block. 4. There is mild-moderate, to perhaps moderate, tricuspid insufficiency. 5. Normal right ventricular size with mildly reduced right ventricular systolic performance. 6. There is mild right atrial enlargement. 7. Right ventricular systolic pressure relative to right atrial pressure is mildly increased. The pulmonary artery pressure is estimated to be 45-50 mmHg plus right atrial pressure (the IVC is mildly dilated).  When compared to the prior real-time echocardiogram dated 28 July 2021, there has been a mild further diminution in left ventricular systolic performance (it is this reader's impression that left ventricular systolic performance was mild-moderately reduced with an ejection fraction 40-45% on the prior examination). ______________________________________________________________________________ Left ventricle: The left ventricle is normal in size. Left ventricular systolic performance is moderately reduced. The ejection fraction is estimated to be 35-40%. There is moderate global reduction in left ventricular systolic performance. There is abnormal septal motion possibly related to altered electrical activation due to bundle branch block. Left ventricular wall thickness is normal. 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  Assessment of left atrial pressure (LAP): The cumulative findings are indeterminate in evaluating left atrial pressure.  Right ventricle: Normal right ventricular size with mildly reduced right ventricular systolic performance.  Left atrium: There is borderline left atrial enlargement.  Right atrium: There is mild right atrial enlargement.  IVC: The IVC is mildly dilated.  Aortic valve: The aortic valve is comprised of three cusps.  Mitral valve: There is mild mitral annular calcification. There is trace mitral insufficiency.  Tricuspid valve: The tricuspid valve is grossly  morphologically normal. There is mild-moderate, to perhaps moderate, tricuspid insufficiency.  Pulmonic valve: The pulmonic valve is grossly morphologically normal.  Thoracic aorta: The aortic root and proximal ascending aorta are of normal dimension.  Pericardium: There is no significant pericardial effusion. ______________________________________________________________________________ ______________________________________________________________________________ MMode/2D Measurements & Calculations RVDd: 3.5 cm IVSd: 0.98 cm LVIDd: 4.1 cm LVIDs: 3.3 cm LVPWd: 0.90 cm FS: 19.4 % LV mass(C)d: 122.9 grams LV mass(C)dI: 85.3 grams/m2 Ao root diam: 2.8 cm LA dimension: 3.5 cm asc Aorta Diam: 2.8 cm LA/Ao: 1.2 LVOT diam: 1.8 cm LVOT area: 2.6 cm2  LA Volume Indexed (AL/bp): 28.8 ml/m2 RWT: 0.44  Time Measurements MM HR: 98.0 BPM  Doppler Measurements & Calculations MV E max carolyn: 89.4 cm/sec MV A max carolyn: 135.9 cm/sec MV E/A: 0.66 MV dec time: 0.12 sec LV V1 max PG: 3.2 mmHg LV V1 max: 89.2 cm/sec LV V1 VTI: 18.3 cm SV(LVOT): 48.2 ml SI(LVOT): 33.4 ml/m2 TR max carolyn: 348.4 cm/sec TR max P.6 mmHg E/E' av.9 Lateral E/e': 11.0 Medial E/e': 16.8  ______________________________________________________________________________ Report approved by: Deepak Anderson 2022 04:24 PM       US Lower Extremity Venous Duplex Bilateral    Result Date: 2022  EXAM: US LOWER EXTREMITY VENOUS DUPLEX BILATERAL LOCATION: [facility] DATE/TIME: 2022 3:49 PM INDICATION: hypoxia, hypotension; leg swelling COMPARISON: None. TECHNIQUE: Venous Duplex ultrasound of bilateral lower extremities with and without compression, augmentation and duplex. Color flow and spectral Doppler with waveform analysis performed. FINDINGS: Exam includes the common femoral, femoral, popliteal veins as well as segmentally visualized deep calf veins and greater saphenous vein. RIGHT: No deep vein thrombosis. No  superficial thrombophlebitis. No popliteal cyst. LEFT: No deep vein thrombosis. No superficial thrombophlebitis. No popliteal cyst.     IMPRESSION: 1.  No deep venous thrombosis in the bilateral lower extremities.    XR Chest Port 1 View    Result Date: 4/14/2022  EXAM: XR CHEST PORT 1 VIEW LOCATION: [facility] DATE/TIME: 4/14/2022 10:22 AM INDICATION: Worsening shortness of breath, coarse breath sounds. COMPARISON: 04/10/2022. Others.     IMPRESSION: Persistent dense left upper lobe consolidation. The surrounding opacities are stable to perhaps marginally improved, though the dense consolidative abnormality is without substantial change. Increased lucency superior to the consolidation could be from aerated lung or developing cavitation. Recommend continued follow-up. Small left pleural effusion. Right lung is grossly clear. Stable cardiomediastinal silhouette. Atherosclerosis. Right PICC tip near the distal SVC. NOTE: ABNORMAL REPORT THE DICTATION ABOVE DESCRIBES AN ABNORMALITY FOR WHICH FOLLOW-UP IS NEEDED.     XR Chest Port 1 View    Result Date: 4/10/2022  EXAM: XR CHEST PORTABLE 1 VIEW LOCATION: [facility] DATE/TIME: 04/10/2022, 7:56 AM INDICATION: Shortness of breath. COMPARISON: CT of the chest, abdomen, and pelvis performed 04/08/2022. Chest radiograph performed 04/07/2022.     IMPRESSION: Ill-defined consolidation and infiltrate in the left upper lobe is again noted. Mild hyperinflation both lungs. The right lung is otherwise clear. Tortuous calcified thoracic aorta. Heart size has increased in prominence compared to 04/07/2022. Pulmonary vascularity is within normal limits where seen. Old right rib fractures. Right PICC line has been removed.     XR Chest Port 1 View    Result Date: 4/7/2022  EXAM: XR CHEST PORT 1 VIEW LOCATION: [facility] DATE/TIME: 4/7/2022 4:49 PM INDICATION: Cough. COMPARISON: 06/02/2021.     IMPRESSION: New  moderate opacity in the left mid to upper lung suggestive of pneumonia given history of cough. However, this requires follow-up to complete resolution to exclude an underlying lesion (6-8 week follow-up radiographs). Right PICC tip near the cavoatrial junction. Normal heart size. Atherosclerosis. No significant pleural effusion.     CT Chest Abdomen Pelvis w/o Contrast    Result Date: 4/8/2022  EXAM: CT CHEST ABDOMEN PELVIS W/O CONTRAST LOCATION: [facility] DATE/TIME: 4/8/2022 2:17 PM INDICATION: History of breast cancer. Pneumonia. Weight loss. COMPARISON: CT chest 6/12/2020, CT chest/abdomen/pelvis 6/11/2019 TECHNIQUE: CT scan of the chest, abdomen, and pelvis was performed without IV contrast. Multiplanar reformats were obtained. Dose reduction techniques were used. CONTRAST: None. FINDINGS: LUNGS AND PLEURA: Moderate irregular consolidation in the posterior left upper lobe. Confluent emphysema. Mild bibasilar atelectasis/scar. Trace pleural effusions. Stable mild diffuse bronchial wall thickening. A few stable benign pulmonary nodules. MEDIASTINUM/AXILLAE: Right PICC. Mediastinal lymphadenopathy with the largest largest being a right paratracheal node measuring 1.6 cm in short axis, image 48:3. Small pericardial effusion. The main pulmonary artery is enlarged at 3.0 cm which may be seen with pulmonary artery hypertension. CORONARY ARTERY CALCIFICATION: Moderate. HEPATOBILIARY: Normal. PANCREAS: Normal. SPLEEN: Normal. ADRENAL GLANDS: Normal. KIDNEYS/BLADDER: Low-lying right kidney. Subcentimeter renal hypodensities which are too small to characterize. No hydronephrosis. BOWEL: No obstruction or inflammatory change. Normal appendix. LYMPH NODES: Normal. VASCULATURE: Stable ectasia of the abdominal aorta measuring 2.9 x 2.3 cm, image 169:3. Moderate atherosclerosis. PELVIC ORGANS: Normal. MUSCULOSKELETAL: Left hip arthroplasty. Osseous demineralization. Degenerative changes of the spine.  Remote left rib fractures. T12 compression fracture with 80% loss of height which is likely remote.     IMPRESSION: 1.  Moderate left upper lobe consolidation. This is consistent with pneumonia. Recommend a follow-up chest radiograph in 4-6 weeks to document resolution. 2.  Mediastinal lymphadenopathy. 3.  No acute abnormality in the abdomen or pelvis.    CT Chest w/o Contrast    Result Date: 4/15/2022  EXAM: CT CHEST W/O CONTRAST LOCATION: [facility] DATE/TIME: 4/15/2022 1:21 PM INDICATION: Pneumonia, effusion or abscess suspected, xray done COMPARISON: CT 4/8/2022 TECHNIQUE: CT chest without IV contrast. Multiplanar reformats were obtained. Dose reduction techniques were used. CONTRAST: None. FINDINGS: LUNGS AND PLEURA: There is now prominent cavity within the consolidated focus left upper lobe. Cavity measures approximately 5 cm with surrounding thick wall and lung consolidation surrounding fat. Mild increase in overall size of left upper lobe involvement. Tiny left pleural effusion now present. Minimal linear density at lung bases. Slight mucus plugging seen posterior right lower lobe. Prominent diffuse changes of emphysema. MEDIASTINUM/AXILLAE: Small reactive lymph nodes are stable. Heavy atherosclerotic calcifications of aorta. CORONARY ARTERY CALCIFICATION: Moderate. UPPER ABDOMEN: Diffuse atherosclerotic calcifications. MUSCULOSKELETAL: Scoliosis and severe compression of T12 unchanged     IMPRESSION: 1.  There is new cavity formation involving the left upper lobe pneumonia with a 5 cm cavity now present within the densely consolidated lung. 2.  Diffuse emphysema unchanged.           Electronically signed by    Paola Rose MD                             Sincerely,        VASHTI Sanchez

## 2022-04-26 NOTE — TELEPHONE ENCOUNTER
[facility]iatrics Lab Note     Provider: Paola Rose MD  Facility: [facility]  Facility Type:  TCU    Allergies   Allergen Reactions     Sulfa (Sulfonamide Antibiotics) [Sulfa Drugs] Rash     Headaches and dizziness.     Homeopathic Drugs [External Allergen Needs Reconciliation - See Comment] Unknown and Other (See Comments)     Comment: runny nose, Comment: runny nose     Mold Extracts [Molds & Smuts] Unknown     Morphine (Pf) [Morphine] Unknown     hallucinate     Lisinopril Cough, Unknown and Itching     Comment: cough, Comment: cough     Sulfacetamide Sodium [Sulfacetamide] Rash       Labs Reviewed by provider: Heme 1, CMP, CRP.       Verbal Order/Direction given by Provider: Fax results to ID.      Provider giving Order:  Paola Rose MD    Verbal Order given to: Monet([phone])    Biju Gabriel RN

## 2022-04-27 NOTE — TELEPHONE ENCOUNTER
Discussed pt's current status with Casey Haynes, and called pt at TCU. Suggested pt reschedule tomorrow's appt to next week or when pt is feeling better. Pt thought the appt was actually Friday, agreed to call to reschedule.

## 2022-04-28 ENCOUNTER — TRANSITIONAL CARE UNIT VISIT (OUTPATIENT)
Dept: GERIATRICS | Facility: CLINIC | Age: 77
End: 2022-04-28
Payer: MEDICARE

## 2022-04-28 VITALS
DIASTOLIC BLOOD PRESSURE: 52 MMHG | HEART RATE: 68 BPM | HEIGHT: 59 IN | WEIGHT: 114.2 LBS | TEMPERATURE: 98.2 F | SYSTOLIC BLOOD PRESSURE: 153 MMHG | RESPIRATION RATE: 28 BRPM | BODY MASS INDEX: 23.02 KG/M2 | OXYGEN SATURATION: 94 %

## 2022-04-28 DIAGNOSIS — I50.33 ACUTE ON CHRONIC DIASTOLIC CONGESTIVE HEART FAILURE (H): ICD-10-CM

## 2022-04-28 DIAGNOSIS — I10 ESSENTIAL HYPERTENSION: ICD-10-CM

## 2022-04-28 DIAGNOSIS — R63.4 WEIGHT LOSS: ICD-10-CM

## 2022-04-28 DIAGNOSIS — R09.02 HYPOXIA: ICD-10-CM

## 2022-04-28 DIAGNOSIS — J85.1 ABSCESS OF UPPER LOBE OF LEFT LUNG WITH PNEUMONIA (H): ICD-10-CM

## 2022-04-28 DIAGNOSIS — J43.9 PULMONARY EMPHYSEMA, UNSPECIFIED EMPHYSEMA TYPE (H): Primary | ICD-10-CM

## 2022-04-28 PROCEDURE — 99309 SBSQ NF CARE MODERATE MDM 30: CPT | Performed by: FAMILY MEDICINE

## 2022-04-28 NOTE — PROGRESS NOTES
[facility] GERIATRIC SERVICES       Patient Irene León  MRN: [id number]        Reason for Visit     Chief Complaint   Patient presents with     RECHECK   Follow-up SOB/ LYMPHEDEMA    Code Status     DNR only    Assessment     Pneumonia  ZONIA Abscess/ cavitory pneumonia  COPD  Hypoxic respiratory failure on 2 L of oxygen  Acute metabolic encephalopathy/delirium  C. difficile colitis  LYMPHEDEMA  Severe anxiety  Wt loss > 40lb  Generalized weakness    Plan     Pt is admitted to TCU for strengthening and rehab.  Patient initially admitted with pneumonia in the setting of COPD and hypoxic respiratory failure.  Unfortunately had a lengthy and prolonged course requiring pulmonary and infectious disease involvement.  Family declined bronchoscopy.  She was noted to have initially pneumonia but course complicated by development of left upper lobe cavity/abscess.  She has been discharged on IV meropenem to finish a total of 3 weeks of IV antibiotics  She will need weekly labs for monitoring.  R/c labs reviewed and show improvement  CRP 2.7  Wbc 11.2  Tolerating abx well and sunday follow-up labs next week  Has lymphedema and refuses wraps  At her request lasix increased to 40 mg daily  Monitor wts and lymphedema  wts are stable and and pt reports good apetite      History     Patient is a very pleasant 77 year old female who is admitted to TCU  Patient was admitted in the hospital with underlying history of COPD with pneumonia.  Her hospitalization was complicated by development of worsening respiratory status.  Repeat CT shows development of left upper lobe cavitation consideration was given for an abscess and pulmonary and infectious disease were involved in her care.  She was given broad-spectrum antibiotics  Patient declined bronchoscopy  She will follow-up with pulmonary  Denies any cough and hypoxia is improved  Course complicated by C. difficile infection for which she was also treated  At baseline has hypoxic  respiratory failure and continues with 2 L of oxygen  She also has COPD and will continue with the nebulizer treatments for that she continues to have shortness of breath  Patient also developed confusion with delirium in the hospital.  Medication adjustment done Vesicare and Myrbetriq were held  Reports urinary frequency   Mood is stable      Past Medical & Surgical History     PAST MEDICAL HISTORY:   Past Medical History:   Diagnosis Date     ANATOLIY (acute kidney injury) (H)      Allergic rhinitis      Arrhythmia      Atrial fibrillation with RVR (H)      Bacteremia      Breast cancer (H) 2017     Cardiomyopathy (H)      Centrilobular emphysema (H)      CHF (congestive heart failure) (H)      CKD (chronic kidney disease)      COPD (chronic obstructive pulmonary disease) (H)      Coronary artery disease      Depression      Dysphagia      E. coli sepsis (H)      Factor 5 Leiden mutation, heterozygous (H)      GERD (gastroesophageal reflux disease)      Hx of radiation therapy 2017     Hyperlipidemia      Hypertension      Hypokalemia      Hypomagnesemia      Idiopathic cardiomyopathy (H)      Left hip pain 4/30/2014     OAB (overactive bladder)      Osteoporosis      Sacral insufficiency fracture      Sinusitis      Syncope      Urge incontinence      Vocal cord dysfunction       PAST SURGICAL HISTORY:   has a past surgical history that includes IR Abdominal Aortogram (4/16/2003); IR Miscellaneous Procedure (4/16/2003); IR Aortic Arch 4 Vessel Angiogram (4/16/2003); Arthroplasty revision hip (Left); OPEN TX FEMORAL FRACTURE DISTAL MED/LAT CONDYLE (Left, 10/28/2015); Cardiac catheterization; Cataract Extraction (Left, 07/18/2017); Biopsy breast (Right, 2017); Bladder surgery; Lumpectomy breast (Left, 06/2017); PICC/Midline Placement (4/7/2022); and PICC/Midline Placement (4/11/2022).      Past Social History     Reviewed,  reports that she quit smoking about 14 years ago. She quit smokeless tobacco use about 17 years  ago. She reports that she does not drink alcohol and does not use drugs.    Family History     Reviewed, and family history includes Breast Cancer in her maternal aunt; Osteoporosis in an other family member; Prostate Cancer in her brother and maternal uncle.    Medication List   Post Discharge Medication Reconciliation Status: Post Discharge Medication Reconciliation Status: discharge medications reconciled, continue medications without change.  Current Outpatient Medications   Medication     acetaminophen (TYLENOL) 500 MG tablet     albuterol (PROVENTIL) (2.5 MG/3ML) 0.083% neb solution     ascorbic acid, vitamin C, (VITAMIN C) 1000 MG tablet     cetirizine (ZYRTEC) 10 MG tablet     chlorhexidine (PERIDEX) 0.12 % solution     cholecalciferol, vitamin D3, 1,000 unit tablet     EPINEPHrine (EPIPEN/ADRENACLICK/AUVI-Q) 0.3 mg/0.3 mL injection     famotidine (PEPCID) 20 MG tablet     fluticasone (FLONASE) 50 MCG/ACT nasal spray     Fluticasone-Umeclidin-Vilanterol (TRELEGY ELLIPTA) 200-62.5-25 MCG/INH oral inhaler     furosemide (LASIX) 20 MG tablet     gabapentin (NEURONTIN) 100 MG capsule     gabapentin (NEURONTIN) 300 MG capsule     guaiFENesin (MUCINEX) 600 MG 12 hr tablet     guaiFENesin (ROBITUSSIN) 20 mg/mL SOLN solution     ipratropium (ATROVENT HFA) 17 MCG/ACT inhaler     ipratropium - albuterol 0.5 mg/2.5 mg/3 mL (DUONEB) 0.5-2.5 (3) MG/3ML neb solution     lactobacillus rhamnosus, GG, (CULTURELL) capsule     melatonin 5 MG tablet     menthol-zinc oxide (CALMOSEPTINE) 0.44-20.6 % OINT ointment     meropenem (MERREM) 1 g vial     metoprolol succinate ER (TOPROL-XL) 25 MG 24 hr tablet     mirtazapine (REMERON) 15 MG tablet     potassium chloride ER (K-TAB/KLOR-CON) 10 MEQ CR tablet     rivaroxaban ANTICOAGULANT (XARELTO) 15 MG TABS tablet     simvastatin (ZOCOR) 40 MG tablet     SODIUM CHLORIDE 0.9 % IV SOLN     tamoxifen (NOLVADEX) 20 MG tablet     traMADol (ULTRAM) 50 MG tablet     vancomycin (VANCOCIN) 125  MG capsule     [START ON 5/5/2022] vancomycin (VANCOCIN) 125 MG capsule     [START ON 5/13/2022] vancomycin (VANCOCIN) 125 MG capsule     [START ON 5/20/2022] vancomycin (VANCOCIN) 125 MG capsule     No current facility-administered medications for this visit.          Allergies     Allergies   Allergen Reactions     Sulfa (Sulfonamide Antibiotics) [Sulfa Drugs] Rash     Headaches and dizziness.     Homeopathic Drugs [External Allergen Needs Reconciliation - See Comment] Unknown and Other (See Comments)     Comment: runny nose, Comment: runny nose     Mold Extracts [Molds & Smuts] Unknown     Morphine (Pf) [Morphine] Unknown     hallucinate     Lisinopril Cough, Unknown and Itching     Comment: cough, Comment: cough     Sulfacetamide Sodium [Sulfacetamide] Rash       Review of Systems   A comprehensive review of 14 systems was done. Pertinent findings noted here and in history of present illness. All the rest negative.  Constitutional: Negative.  Negative for fever, chills, she has  activity change, appetite change and fatigue.   But she had profound anorexia.  She reports a weight loss of more than 40 pounds in the last few weeks.  She does not feel like eating and continues to lose weight but now she states that she has gained a few pounds  HENT: Negative for congestion and facial swelling.    Eyes: Negative for photophobia, redness and visual disturbance.   Respiratory: Negative for  chest tightness.  Has cough with some expectoration  Cardiovascular: Negative for chest pain, palpitations and has chronic leg swelling.   Concerned about increased swelling  Cannot tolerate wraps   Does elevate her legs when resting  Gastrointestinal: Negative for nausea, diarrhea, constipation, blood in stool and abdominal distention.   Genitourinary: Negative.    Musculoskeletal: Has chronic back issues due to compression fractures in the past  Skin: Negative.    Neurological: Negative for dizziness, tremors, syncope, weakness,  "light-headedness and headaches.   Endurance is limited and cannot stand for more than 10 minutes  Hematological: Does not bruise/bleed easily.   Psychiatric/Behavioral: Negative.  Mood/anxiety is better      Physical Exam   BP (!) 153/52   Pulse 68   Temp 98.2  F (36.8  C)   Resp 28   Ht 1.499 m (4' 11\")   Wt 51.8 kg (114 lb 3.2 oz)   SpO2 94%   BMI 23.07 kg/m     On 3 L oxygen  Constitutional: Oriented to person, place, and time and appears well-developed.   HEENT:  Normocephalic and atraumatic.  Eyes: Conjunctivae and EOM are normal. Pupils are equal, round, and reactive to light. No discharge.  No scleral icterus. Nose normal. Mouth/Throat: Oropharynx is clear and moist. No oropharyngeal exudate.    NECK: Normal range of motion. Neck supple. No JVD present. No tracheal deviation present. No thyromegaly present.   CARDIOVASCULAR: Normal rate, regular rhythm and intact distal pulses.  Exam reveals no gallop and no friction rub.  Systolic murmur present.  PULMONARY: Effort normal and breath sounds normal. No respiratory distress.No Wheezing or rales.  ABDOMEN: Soft. Bowel sounds are normal. No distension and no mass.  There is no tenderness. There is no rebound and no guarding. No HSM.  MUSCULOSKELETAL: Normal range of motion. no tenderness. Mild kyphosis, no tenderness.  LYMPH NODES: Has no cervical, supraclavicular, axillary and groin adenopathy.   NEUROLOGICAL: Alert and oriented to person, place, and time. No cranial nerve deficit.  Normal muscle tone. Coordination normal.   GENITOURINARY: Deferred exam.  SKIN: Skin is warm and dry. No rash noted. No erythema. No pallor.   Stasis dermatitis of both legs with edema and presence of scaling of skin and hyperpigmentary changes which are chronic  EXTREMITIES: No cyanosis, no clubbing, has 1+ lower extremity edema. No Deformity.  PSYCHIATRIC: Normal mood, affect and behavior.      Lab Results     Recent Results (from the past 240 hour(s))   Blastomyces antibody " ID    Collection Time: 04/18/22 11:22 PM   Result Value Ref Range    Blastomyces dermatitidis Abs, Precipitin None Detected None Detected   Blastomyces Agn Quant EIA Blood    Collection Time: 04/18/22 11:22 PM    Specimen: Central Venous Line; Blood   Result Value Ref Range    See Scanned Result BLASTOMYCES AGN QUANT EIA BLOOD-Scanned    Fungal Antibodies    Collection Time: 04/18/22 11:22 PM   Result Value Ref Range    Blastomyces Moira by EIA 0.3 <=0.9 IV    Aspergillus Antibody by CF <1:8 <1:8    Coccidioides Antibody by CF <1:2 <1:2    Histoplasma Mycelia, CF <1:8 <1:8    Histoplasma Yeast, CF <1:8 <1:8   Histoplasma Moira by Immunodiffusion    Collection Time: 04/18/22 11:22 PM    Specimen: Central Venous Line; Blood   Result Value Ref Range    See Scanned Result HISTOPLASMA ANTIBODY BY IMMUNODIFFUSION-Scanned    Histoplasma Antigen Quantitative by EIA    Collection Time: 04/18/22 11:22 PM   Result Value Ref Range    Histoplasma Antigen, Serum Not Detected Not Detected    Histoplasma Antigen, Serum Interp Not Detected Not Detected   Aspergillus antibody    Collection Time: 04/18/22 11:22 PM   Result Value Ref Range    Aspergillus spp. Abs, Precipitin None Detected None Detected   Aspergillus Galactomannan Antigen    Collection Time: 04/18/22 11:22 PM    Specimen: Line, venous; Blood   Result Value Ref Range    Aspergillus Galactomannan Index 0.04     Aspergillus Galact AG Negative Negative   1,3 Beta D glucan fungitell    Collection Time: 04/18/22 11:22 PM   Result Value Ref Range    (1,3)-Beta-D-Glucan <31 pg/mL    B-D GLUCAN INTERPRETATION (1,3) Negative Negative   Allergen Interpretation    Collection Time: 04/18/22 11:22 PM   Result Value Ref Range    Allergen, Interp, Immunocap Score IgE See Note    CRP inflammation    Collection Time: 04/19/22  4:44 AM   Result Value Ref Range    CRP 5.7 (H) 0.0 - 0.8 mg/dL   Comprehensive metabolic panel    Collection Time: 04/19/22  4:44 AM   Result Value Ref Range     Sodium 142 136 - 145 mmol/L    Potassium 3.8 3.5 - 5.0 mmol/L    Chloride 106 98 - 107 mmol/L    Carbon Dioxide (CO2) 30 22 - 31 mmol/L    Anion Gap 6 5 - 18 mmol/L    Urea Nitrogen 20 8 - 28 mg/dL    Creatinine 0.94 0.60 - 1.10 mg/dL    Calcium 7.8 (L) 8.5 - 10.5 mg/dL    Glucose 86 70 - 125 mg/dL    Alkaline Phosphatase 54 45 - 120 U/L    AST 14 0 - 40 U/L    ALT 11 0 - 45 U/L    Protein Total 4.8 (L) 6.0 - 8.0 g/dL    Albumin 1.7 (L) 3.5 - 5.0 g/dL    Bilirubin Total 0.3 0.0 - 1.0 mg/dL    GFR Estimate 62 >60 mL/min/1.73m2   Magnesium    Collection Time: 04/19/22  4:44 AM   Result Value Ref Range    Magnesium 2.0 1.8 - 2.6 mg/dL   Phosphorus    Collection Time: 04/19/22  4:44 AM   Result Value Ref Range    Phosphorus 3.4 2.5 - 4.5 mg/dL   CBC with platelets and differential    Collection Time: 04/19/22  4:44 AM   Result Value Ref Range    WBC Count 18.7 (H) 4.0 - 11.0 10e3/uL    RBC Count 3.15 (L) 3.80 - 5.20 10e6/uL    Hemoglobin 8.9 (L) 11.7 - 15.7 g/dL    Hematocrit 27.5 (L) 35.0 - 47.0 %    MCV 87 78 - 100 fL    MCH 28.3 26.5 - 33.0 pg    MCHC 32.4 31.5 - 36.5 g/dL    RDW 15.0 10.0 - 15.0 %    Platelet Count 342 150 - 450 10e3/uL    % Neutrophils 79 %    % Lymphocytes 10 %    % Monocytes 10 %    % Eosinophils 0 %    % Basophils 0 %    % Immature Granulocytes 1 %    NRBCs per 100 WBC 0 <1 /100    Absolute Neutrophils 14.7 (H) 1.6 - 8.3 10e3/uL    Absolute Lymphocytes 1.8 0.8 - 5.3 10e3/uL    Absolute Monocytes 1.9 (H) 0.0 - 1.3 10e3/uL    Absolute Eosinophils 0.1 0.0 - 0.7 10e3/uL    Absolute Basophils 0.0 0.0 - 0.2 10e3/uL    Absolute Immature Granulocytes 0.2 <=0.4 10e3/uL    Absolute NRBCs 0.0 10e3/uL   Basic metabolic panel    Collection Time: 04/20/22  6:20 AM   Result Value Ref Range    Sodium 141 136 - 145 mmol/L    Potassium 4.2 3.5 - 5.0 mmol/L    Chloride 105 98 - 107 mmol/L    Carbon Dioxide (CO2) 26 22 - 31 mmol/L    Anion Gap 10 5 - 18 mmol/L    Urea Nitrogen 19 8 - 28 mg/dL    Creatinine 0.92  0.60 - 1.10 mg/dL    Calcium 8.4 (L) 8.5 - 10.5 mg/dL    Glucose 80 70 - 125 mg/dL    GFR Estimate 64 >60 mL/min/1.73m2   CRP inflammation    Collection Time: 04/20/22  6:20 AM   Result Value Ref Range    CRP 5.9 (H) 0.0 - 0.8 mg/dL   Phosphorus    Collection Time: 04/20/22  6:20 AM   Result Value Ref Range    Phosphorus 3.2 2.5 - 4.5 mg/dL   Magnesium    Collection Time: 04/20/22  6:20 AM   Result Value Ref Range    Magnesium 2.5 1.8 - 2.6 mg/dL   CBC with platelets and differential    Collection Time: 04/20/22  6:20 AM   Result Value Ref Range    WBC Count 22.1 (H) 4.0 - 11.0 10e3/uL    RBC Count 3.65 (L) 3.80 - 5.20 10e6/uL    Hemoglobin 10.4 (L) 11.7 - 15.7 g/dL    Hematocrit 33.0 (L) 35.0 - 47.0 %    MCV 90 78 - 100 fL    MCH 28.5 26.5 - 33.0 pg    MCHC 31.5 31.5 - 36.5 g/dL    RDW 15.0 10.0 - 15.0 %    Platelet Count 403 150 - 450 10e3/uL    % Neutrophils 80 %    % Lymphocytes 9 %    % Monocytes 9 %    % Eosinophils 1 %    % Basophils 0 %    % Immature Granulocytes 1 %    NRBCs per 100 WBC 0 <1 /100    Absolute Neutrophils 17.7 (H) 1.6 - 8.3 10e3/uL    Absolute Lymphocytes 1.9 0.8 - 5.3 10e3/uL    Absolute Monocytes 2.0 (H) 0.0 - 1.3 10e3/uL    Absolute Eosinophils 0.1 0.0 - 0.7 10e3/uL    Absolute Basophils 0.0 0.0 - 0.2 10e3/uL    Absolute Immature Granulocytes 0.2 <=0.4 10e3/uL    Absolute NRBCs 0.0 10e3/uL   ECG 12-LEAD WITH MUSE ([facility])    Collection Time: 04/20/22  2:29 PM   Result Value Ref Range    Systolic Blood Pressure  mmHg    Diastolic Blood Pressure  mmHg    Ventricular Rate 79 BPM    Atrial Rate 79 BPM    MT Interval 120 ms    QRS Duration 114 ms     ms    QTc 488 ms    P Axis 79 degrees    R AXIS -33 degrees    T Axis 98 degrees    Interpretation ECG       Sinus rhythm with Premature ventricular complexes  Left axis deviation  Incomplete left bundle branch block  Nonspecific ST and T wave abnormality  Prolonged QT  Abnormal ECG  When compared with ECG of 15-APR-2022 05:41,  No significant  change was found  Confirmed by MAKENNA WAYNE MD LOC:[facility] ([id number]) on 4/20/2022 3:56:31 PM     Basic metabolic panel    Collection Time: 04/21/22  6:08 AM   Result Value Ref Range    Sodium 144 136 - 145 mmol/L    Potassium 3.5 3.5 - 5.0 mmol/L    Chloride 111 (H) 98 - 107 mmol/L    Carbon Dioxide (CO2) 26 22 - 31 mmol/L    Anion Gap 7 5 - 18 mmol/L    Urea Nitrogen 17 8 - 28 mg/dL    Creatinine 0.79 0.60 - 1.10 mg/dL    Calcium 7.4 (L) 8.5 - 10.5 mg/dL    Glucose 75 70 - 125 mg/dL    GFR Estimate 77 >60 mL/min/1.73m2   CRP inflammation    Collection Time: 04/21/22  6:08 AM   Result Value Ref Range    CRP 4.8 (H) 0.0 - 0.8 mg/dL   Magnesium    Collection Time: 04/21/22  6:08 AM   Result Value Ref Range    Magnesium 2.1 1.8 - 2.6 mg/dL   Phosphorus    Collection Time: 04/21/22  6:08 AM   Result Value Ref Range    Phosphorus 3.2 2.5 - 4.5 mg/dL   Procalcitonin    Collection Time: 04/21/22  6:08 AM   Result Value Ref Range    Procalcitonin 0.07 0.00 - 0.49 ng/mL   CBC with platelets and differential    Collection Time: 04/21/22  6:08 AM   Result Value Ref Range    WBC Count 13.8 (H) 4.0 - 11.0 10e3/uL    RBC Count 3.07 (L) 3.80 - 5.20 10e6/uL    Hemoglobin 8.7 (L) 11.7 - 15.7 g/dL    Hematocrit 27.3 (L) 35.0 - 47.0 %    MCV 89 78 - 100 fL    MCH 28.3 26.5 - 33.0 pg    MCHC 31.9 31.5 - 36.5 g/dL    RDW 15.2 (H) 10.0 - 15.0 %    Platelet Count 352 150 - 450 10e3/uL    % Neutrophils 77 %    % Lymphocytes 11 %    % Monocytes 10 %    % Eosinophils 1 %    % Basophils 0 %    % Immature Granulocytes 1 %    NRBCs per 100 WBC 0 <1 /100    Absolute Neutrophils 10.8 (H) 1.6 - 8.3 10e3/uL    Absolute Lymphocytes 1.4 0.8 - 5.3 10e3/uL    Absolute Monocytes 1.3 0.0 - 1.3 10e3/uL    Absolute Eosinophils 0.1 0.0 - 0.7 10e3/uL    Absolute Basophils 0.0 0.0 - 0.2 10e3/uL    Absolute Immature Granulocytes 0.1 <=0.4 10e3/uL    Absolute NRBCs 0.0 10e3/uL   Respiratory Aerobic Bacterial Culture    Collection Time: 04/21/22  9:13 AM    Specimen:  Expectorate; Sputum   Result Value Ref Range    Culture 3+ Normal john     Culture 4+ Candida albicans (A)     Gram Stain Result <10 Squamous epithelial cells/low power field     Gram Stain Result >25 PMNs/low power field     Gram Stain Result 2+ Mixed john    Histoplasma Galactomannan Antigen Quant by EIA, Urine    Collection Time: 04/22/22 12:22 AM   Result Value Ref Range    Histoplasma Galactomannan Ag Quant, Urn Not Detected ng/mL    Histoplasma Galactomannan Ag Interp, Urn Not Detected Not Detected   Basic metabolic panel    Collection Time: 04/22/22  6:46 AM   Result Value Ref Range    Sodium 145 136 - 145 mmol/L    Potassium 3.8 3.5 - 5.0 mmol/L    Chloride 108 (H) 98 - 107 mmol/L    Carbon Dioxide (CO2) 29 22 - 31 mmol/L    Anion Gap 8 5 - 18 mmol/L    Urea Nitrogen 20 8 - 28 mg/dL    Creatinine 0.86 0.60 - 1.10 mg/dL    Calcium 7.6 (L) 8.5 - 10.5 mg/dL    Glucose 76 70 - 125 mg/dL    GFR Estimate 69 >60 mL/min/1.73m2   CRP inflammation    Collection Time: 04/22/22  6:46 AM   Result Value Ref Range    CRP 5.0 (H) 0.0 - 0.8 mg/dL   CBC with platelets and differential    Collection Time: 04/22/22  6:46 AM   Result Value Ref Range    WBC Count 13.5 (H) 4.0 - 11.0 10e3/uL    RBC Count 2.92 (L) 3.80 - 5.20 10e6/uL    Hemoglobin 8.3 (L) 11.7 - 15.7 g/dL    Hematocrit 27.1 (L) 35.0 - 47.0 %    MCV 93 78 - 100 fL    MCH 28.4 26.5 - 33.0 pg    MCHC 30.6 (L) 31.5 - 36.5 g/dL    RDW 15.0 10.0 - 15.0 %    Platelet Count 338 150 - 450 10e3/uL    % Neutrophils 74 %    % Lymphocytes 13 %    % Monocytes 11 %    % Eosinophils 1 %    % Basophils 0 %    % Immature Granulocytes 1 %    NRBCs per 100 WBC 0 <1 /100    Absolute Neutrophils 10.1 (H) 1.6 - 8.3 10e3/uL    Absolute Lymphocytes 1.7 0.8 - 5.3 10e3/uL    Absolute Monocytes 1.4 (H) 0.0 - 1.3 10e3/uL    Absolute Eosinophils 0.1 0.0 - 0.7 10e3/uL    Absolute Basophils 0.0 0.0 - 0.2 10e3/uL    Absolute Immature Granulocytes 0.1 <=0.4 10e3/uL    Absolute NRBCs 0.0 10e3/uL    Asymptomatic COVID-19 Virus (Coronavirus) by PCR Nose    Collection Time: 04/22/22  9:57 AM    Specimen: Nose; Swab   Result Value Ref Range    SARS CoV2 PCR Negative Negative   Comprehensive metabolic panel    Collection Time: 04/26/22  8:34 AM   Result Value Ref Range    Sodium 143 136 - 145 mmol/L    Potassium 4.3 3.5 - 5.0 mmol/L    Chloride 109 (H) 98 - 107 mmol/L    Carbon Dioxide (CO2) 26 22 - 31 mmol/L    Anion Gap 8 5 - 18 mmol/L    Urea Nitrogen 18 8 - 28 mg/dL    Creatinine 0.99 0.60 - 1.10 mg/dL    Calcium 8.1 (L) 8.5 - 10.5 mg/dL    Glucose 73 70 - 125 mg/dL    Alkaline Phosphatase 66 45 - 120 U/L    AST 17 0 - 40 U/L    ALT <9 0 - 45 U/L    Protein Total 5.6 (L) 6.0 - 8.0 g/dL    Albumin 2.0 (L) 3.5 - 5.0 g/dL    Bilirubin Total 0.3 0.0 - 1.0 mg/dL    GFR Estimate 58 (L) >60 mL/min/1.73m2   CRP inflammation    Collection Time: 04/26/22  8:34 AM   Result Value Ref Range    CRP 2.7 (H) 0.0 - 0.8 mg/dL   CBC with platelets and differential    Collection Time: 04/26/22  8:34 AM   Result Value Ref Range    WBC Count 11.2 (H) 4.0 - 11.0 10e3/uL    RBC Count 3.22 (L) 3.80 - 5.20 10e6/uL    Hemoglobin 9.3 (L) 11.7 - 15.7 g/dL    Hematocrit 29.9 (L) 35.0 - 47.0 %    MCV 93 78 - 100 fL    MCH 28.9 26.5 - 33.0 pg    MCHC 31.1 (L) 31.5 - 36.5 g/dL    RDW 14.9 10.0 - 15.0 %    Platelet Count 295 150 - 450 10e3/uL    % Neutrophils 71 %    % Lymphocytes 15 %    % Monocytes 10 %    % Eosinophils 2 %    % Basophils 1 %    % Immature Granulocytes 1 %    NRBCs per 100 WBC 0 <1 /100    Absolute Neutrophils 8.0 1.6 - 8.3 10e3/uL    Absolute Lymphocytes 1.7 0.8 - 5.3 10e3/uL    Absolute Monocytes 1.2 0.0 - 1.3 10e3/uL    Absolute Eosinophils 0.2 0.0 - 0.7 10e3/uL    Absolute Basophils 0.1 0.0 - 0.2 10e3/uL    Absolute Immature Granulocytes 0.1 <=0.4 10e3/uL    Absolute NRBCs 0.0 10e3/uL           Electronically signed by    Paola Rose MD

## 2022-04-28 NOTE — LETTER
4/28/2022        RE: Irene León  [address] [facility] [address]        [facility] GERIATRIC [facility]       Patient Irene León  MRN: [id number]        Reason for Visit     Chief Complaint   Patient presents with     RECHECK   Follow-up SOB/ LYMPHEDEMA    Code Status     DNR only    Assessment     Pneumonia  ZONIA Abscess/ cavitory pneumonia  COPD  Hypoxic respiratory failure on 2 L of oxygen  Acute metabolic encephalopathy/delirium  C. difficile colitis  LYMPHEDEMA  Severe anxiety  Wt loss > 40lb  Generalized weakness    Plan     Pt is admitted to TCU for strengthening and rehab.  Patient initially admitted with pneumonia in the setting of COPD and hypoxic respiratory failure.  Unfortunately had a lengthy and prolonged course requiring pulmonary and infectious disease involvement.  Family declined bronchoscopy.  She was noted to have initially pneumonia but course complicated by development of left upper lobe cavity/abscess.  She has been discharged on IV meropenem to finish a total of 3 weeks of IV antibiotics  She will need weekly labs for monitoring.  R/c labs reviewed and show improvement  CRP 2.7  Wbc 11.2  Tolerating abx well and sunday follow-up labs next week  Has lymphedema and refuses wraps  At her request lasix increased to 40 mg daily  Monitor wts and lymphedema  wts are stable and and pt reports good apetite      History     Patient is a very pleasant 77 year old female who is admitted to TCU  Patient was admitted in the hospital with underlying history of COPD with pneumonia.  Her hospitalization was complicated by development of worsening respiratory status.  Repeat CT shows development of left upper lobe cavitation consideration was given for an abscess and pulmonary and infectious disease were involved in her care.  She was given broad-spectrum antibiotics  Patient declined bronchoscopy  She will follow-up with pulmonary  Denies any cough and hypoxia is improved  Course complicated by C.  difficile infection for which she was also treated  At baseline has hypoxic respiratory failure and continues with 2 L of oxygen  She also has COPD and will continue with the nebulizer treatments for that she continues to have shortness of breath  Patient also developed confusion with delirium in the hospital.  Medication adjustment done Vesicare and Myrbetriq were held  Reports urinary frequency   Mood is stable      Past Medical & Surgical History     PAST MEDICAL HISTORY:   Past Medical History:   Diagnosis Date     ANATOLIY (acute kidney injury) (H)      Allergic rhinitis      Arrhythmia      Atrial fibrillation with RVR (H)      Bacteremia      Breast cancer (H) 2017     Cardiomyopathy (H)      Centrilobular emphysema (H)      CHF (congestive heart failure) (H)      CKD (chronic kidney disease)      COPD (chronic obstructive pulmonary disease) (H)      Coronary artery disease      Depression      Dysphagia      E. coli sepsis (H)      Factor 5 Leiden mutation, heterozygous (H)      GERD (gastroesophageal reflux disease)      Hx of radiation therapy 2017     Hyperlipidemia      Hypertension      Hypokalemia      Hypomagnesemia      Idiopathic cardiomyopathy (H)      Left hip pain 4/30/2014     OAB (overactive bladder)      Osteoporosis      Sacral insufficiency fracture      Sinusitis      Syncope      Urge incontinence      Vocal cord dysfunction       PAST SURGICAL HISTORY:   has a past surgical history that includes IR Abdominal Aortogram (4/16/2003); IR Miscellaneous Procedure (4/16/2003); IR Aortic Arch 4 Vessel Angiogram (4/16/2003); Arthroplasty revision hip (Left); OPEN TX FEMORAL FRACTURE DISTAL MED/LAT CONDYLE (Left, 10/28/2015); Cardiac catheterization; Cataract Extraction (Left, 07/18/2017); Biopsy breast (Right, 2017); Bladder surgery; Lumpectomy breast (Left, 06/2017); PICC/Midline Placement (4/7/2022); and PICC/Midline Placement (4/11/2022).      Past Social History     Reviewed,  reports that she quit  smoking about 14 years ago. She quit smokeless tobacco use about 17 years ago. She reports that she does not drink alcohol and does not use drugs.    Family History     Reviewed, and family history includes Breast Cancer in her maternal aunt; Osteoporosis in an other family member; Prostate Cancer in her brother and maternal uncle.    Medication List   Post Discharge Medication Reconciliation Status: Post Discharge Medication Reconciliation Status: discharge medications reconciled, continue medications without change.  Current Outpatient Medications   Medication     acetaminophen (TYLENOL) 500 MG tablet     albuterol (PROVENTIL) (2.5 MG/3ML) 0.083% neb solution     ascorbic acid, vitamin C, (VITAMIN C) 1000 MG tablet     cetirizine (ZYRTEC) 10 MG tablet     chlorhexidine (PERIDEX) 0.12 % solution     cholecalciferol, vitamin D3, 1,000 unit tablet     EPINEPHrine (EPIPEN/ADRENACLICK/AUVI-Q) 0.3 mg/0.3 mL injection     famotidine (PEPCID) 20 MG tablet     fluticasone (FLONASE) 50 MCG/ACT nasal spray     Fluticasone-Umeclidin-Vilanterol (TRELEGY ELLIPTA) 200-62.5-25 MCG/INH oral inhaler     furosemide (LASIX) 20 MG tablet     gabapentin (NEURONTIN) 100 MG capsule     gabapentin (NEURONTIN) 300 MG capsule     guaiFENesin (MUCINEX) 600 MG 12 hr tablet     guaiFENesin (ROBITUSSIN) 20 mg/mL SOLN solution     ipratropium (ATROVENT HFA) 17 MCG/ACT inhaler     ipratropium - albuterol 0.5 mg/2.5 mg/3 mL (DUONEB) 0.5-2.5 (3) MG/3ML neb solution     lactobacillus rhamnosus, GG, (CULTURELL) capsule     melatonin 5 MG tablet     menthol-zinc oxide (CALMOSEPTINE) 0.44-20.6 % OINT ointment     meropenem (MERREM) 1 g vial     metoprolol succinate ER (TOPROL-XL) 25 MG 24 hr tablet     mirtazapine (REMERON) 15 MG tablet     potassium chloride ER (K-TAB/KLOR-CON) 10 MEQ CR tablet     rivaroxaban ANTICOAGULANT (XARELTO) 15 MG TABS tablet     simvastatin (ZOCOR) 40 MG tablet     SODIUM CHLORIDE 0.9 % IV SOLN     tamoxifen (NOLVADEX) 20 MG  tablet     traMADol (ULTRAM) 50 MG tablet     vancomycin (VANCOCIN) 125 MG capsule     [START ON 5/5/2022] vancomycin (VANCOCIN) 125 MG capsule     [START ON 5/13/2022] vancomycin (VANCOCIN) 125 MG capsule     [START ON 5/20/2022] vancomycin (VANCOCIN) 125 MG capsule     No current facility-administered medications for this visit.          Allergies     Allergies   Allergen Reactions     Sulfa (Sulfonamide Antibiotics) [Sulfa Drugs] Rash     Headaches and dizziness.     Homeopathic Drugs [External Allergen Needs Reconciliation - See Comment] Unknown and Other (See Comments)     Comment: runny nose, Comment: runny nose     Mold Extracts [Molds & Smuts] Unknown     Morphine (Pf) [Morphine] Unknown     hallucinate     Lisinopril Cough, Unknown and Itching     Comment: cough, Comment: cough     Sulfacetamide Sodium [Sulfacetamide] Rash       Review of Systems   A comprehensive review of 14 systems was done. Pertinent findings noted here and in history of present illness. All the rest negative.  Constitutional: Negative.  Negative for fever, chills, she has  activity change, appetite change and fatigue.   But she had profound anorexia.  She reports a weight loss of more than 40 pounds in the last few weeks.  She does not feel like eating and continues to lose weight but now she states that she has gained a few pounds  HENT: Negative for congestion and facial swelling.    Eyes: Negative for photophobia, redness and visual disturbance.   Respiratory: Negative for  chest tightness.  Has cough with some expectoration  Cardiovascular: Negative for chest pain, palpitations and has chronic leg swelling.   Concerned about increased swelling  Cannot tolerate wraps   Does elevate her legs when resting  Gastrointestinal: Negative for nausea, diarrhea, constipation, blood in stool and abdominal distention.   Genitourinary: Negative.    Musculoskeletal: Has chronic back issues due to compression fractures in the past  Skin: Negative.  "   Neurological: Negative for dizziness, tremors, syncope, weakness, light-headedness and headaches.   Endurance is limited and cannot stand for more than 10 minutes  Hematological: Does not bruise/bleed easily.   Psychiatric/Behavioral: Negative.  Mood/anxiety is better      Physical Exam   BP (!) 153/52   Pulse 68   Temp 98.2  F (36.8  C)   Resp 28   Ht 1.499 m (4' 11\")   Wt 51.8 kg (114 lb 3.2 oz)   SpO2 94%   BMI 23.07 kg/m     On 3 L oxygen  Constitutional: Oriented to person, place, and time and appears well-developed.   HEENT:  Normocephalic and atraumatic.  Eyes: Conjunctivae and EOM are normal. Pupils are equal, round, and reactive to light. No discharge.  No scleral icterus. Nose normal. Mouth/Throat: Oropharynx is clear and moist. No oropharyngeal exudate.    NECK: Normal range of motion. Neck supple. No JVD present. No tracheal deviation present. No thyromegaly present.   CARDIOVASCULAR: Normal rate, regular rhythm and intact distal pulses.  Exam reveals no gallop and no friction rub.  Systolic murmur present.  PULMONARY: Effort normal and breath sounds normal. No respiratory distress.No Wheezing or rales.  ABDOMEN: Soft. Bowel sounds are normal. No distension and no mass.  There is no tenderness. There is no rebound and no guarding. No HSM.  MUSCULOSKELETAL: Normal range of motion. no tenderness. Mild kyphosis, no tenderness.  LYMPH NODES: Has no cervical, supraclavicular, axillary and groin adenopathy.   NEUROLOGICAL: Alert and oriented to person, place, and time. No cranial nerve deficit.  Normal muscle tone. Coordination normal.   GENITOURINARY: Deferred exam.  SKIN: Skin is warm and dry. No rash noted. No erythema. No pallor.   Stasis dermatitis of both legs with edema and presence of scaling of skin and hyperpigmentary changes which are chronic  EXTREMITIES: No cyanosis, no clubbing, has 1+ lower extremity edema. No Deformity.  PSYCHIATRIC: Normal mood, affect and behavior.      Lab Results "     Recent Results (from the past 240 hour(s))   Blastomyces antibody ID    Collection Time: 04/18/22 11:22 PM   Result Value Ref Range    Blastomyces dermatitidis Abs, Precipitin None Detected None Detected   Blastomyces Agn Quant EIA Blood    Collection Time: 04/18/22 11:22 PM    Specimen: Central Venous Line; Blood   Result Value Ref Range    See Scanned Result BLASTOMYCES AGN QUANT EIA BLOOD-Scanned    Fungal Antibodies    Collection Time: 04/18/22 11:22 PM   Result Value Ref Range    Blastomyces Moira by EIA 0.3 <=0.9 IV    Aspergillus Antibody by CF <1:8 <1:8    Coccidioides Antibody by CF <1:2 <1:2    Histoplasma Mycelia, CF <1:8 <1:8    Histoplasma Yeast, CF <1:8 <1:8   Histoplasma Moira by Immunodiffusion    Collection Time: 04/18/22 11:22 PM    Specimen: Central Venous Line; Blood   Result Value Ref Range    See Scanned Result HISTOPLASMA ANTIBODY BY IMMUNODIFFUSION-Scanned    Histoplasma Antigen Quantitative by EIA    Collection Time: 04/18/22 11:22 PM   Result Value Ref Range    Histoplasma Antigen, Serum Not Detected Not Detected    Histoplasma Antigen, Serum Interp Not Detected Not Detected   Aspergillus antibody    Collection Time: 04/18/22 11:22 PM   Result Value Ref Range    Aspergillus spp. Abs, Precipitin None Detected None Detected   Aspergillus Galactomannan Antigen    Collection Time: 04/18/22 11:22 PM    Specimen: Line, venous; Blood   Result Value Ref Range    Aspergillus Galactomannan Index 0.04     Aspergillus Galact AG Negative Negative   1,3 Beta D glucan fungitell    Collection Time: 04/18/22 11:22 PM   Result Value Ref Range    (1,3)-Beta-D-Glucan <31 pg/mL    B-D GLUCAN INTERPRETATION (1,3) Negative Negative   Allergen Interpretation    Collection Time: 04/18/22 11:22 PM   Result Value Ref Range    Allergen, Interp, Immunocap Score IgE See Note    CRP inflammation    Collection Time: 04/19/22  4:44 AM   Result Value Ref Range    CRP 5.7 (H) 0.0 - 0.8 mg/dL   Comprehensive metabolic panel     Collection Time: 04/19/22  4:44 AM   Result Value Ref Range    Sodium 142 136 - 145 mmol/L    Potassium 3.8 3.5 - 5.0 mmol/L    Chloride 106 98 - 107 mmol/L    Carbon Dioxide (CO2) 30 22 - 31 mmol/L    Anion Gap 6 5 - 18 mmol/L    Urea Nitrogen 20 8 - 28 mg/dL    Creatinine 0.94 0.60 - 1.10 mg/dL    Calcium 7.8 (L) 8.5 - 10.5 mg/dL    Glucose 86 70 - 125 mg/dL    Alkaline Phosphatase 54 45 - 120 U/L    AST 14 0 - 40 U/L    ALT 11 0 - 45 U/L    Protein Total 4.8 (L) 6.0 - 8.0 g/dL    Albumin 1.7 (L) 3.5 - 5.0 g/dL    Bilirubin Total 0.3 0.0 - 1.0 mg/dL    GFR Estimate 62 >60 mL/min/1.73m2   Magnesium    Collection Time: 04/19/22  4:44 AM   Result Value Ref Range    Magnesium 2.0 1.8 - 2.6 mg/dL   Phosphorus    Collection Time: 04/19/22  4:44 AM   Result Value Ref Range    Phosphorus 3.4 2.5 - 4.5 mg/dL   CBC with platelets and differential    Collection Time: 04/19/22  4:44 AM   Result Value Ref Range    WBC Count 18.7 (H) 4.0 - 11.0 10e3/uL    RBC Count 3.15 (L) 3.80 - 5.20 10e6/uL    Hemoglobin 8.9 (L) 11.7 - 15.7 g/dL    Hematocrit 27.5 (L) 35.0 - 47.0 %    MCV 87 78 - 100 fL    MCH 28.3 26.5 - 33.0 pg    MCHC 32.4 31.5 - 36.5 g/dL    RDW 15.0 10.0 - 15.0 %    Platelet Count 342 150 - 450 10e3/uL    % Neutrophils 79 %    % Lymphocytes 10 %    % Monocytes 10 %    % Eosinophils 0 %    % Basophils 0 %    % Immature Granulocytes 1 %    NRBCs per 100 WBC 0 <1 /100    Absolute Neutrophils 14.7 (H) 1.6 - 8.3 10e3/uL    Absolute Lymphocytes 1.8 0.8 - 5.3 10e3/uL    Absolute Monocytes 1.9 (H) 0.0 - 1.3 10e3/uL    Absolute Eosinophils 0.1 0.0 - 0.7 10e3/uL    Absolute Basophils 0.0 0.0 - 0.2 10e3/uL    Absolute Immature Granulocytes 0.2 <=0.4 10e3/uL    Absolute NRBCs 0.0 10e3/uL   Basic metabolic panel    Collection Time: 04/20/22  6:20 AM   Result Value Ref Range    Sodium 141 136 - 145 mmol/L    Potassium 4.2 3.5 - 5.0 mmol/L    Chloride 105 98 - 107 mmol/L    Carbon Dioxide (CO2) 26 22 - 31 mmol/L    Anion Gap 10 5 -  18 mmol/L    Urea Nitrogen 19 8 - 28 mg/dL    Creatinine 0.92 0.60 - 1.10 mg/dL    Calcium 8.4 (L) 8.5 - 10.5 mg/dL    Glucose 80 70 - 125 mg/dL    GFR Estimate 64 >60 mL/min/1.73m2   CRP inflammation    Collection Time: 04/20/22  6:20 AM   Result Value Ref Range    CRP 5.9 (H) 0.0 - 0.8 mg/dL   Phosphorus    Collection Time: 04/20/22  6:20 AM   Result Value Ref Range    Phosphorus 3.2 2.5 - 4.5 mg/dL   Magnesium    Collection Time: 04/20/22  6:20 AM   Result Value Ref Range    Magnesium 2.5 1.8 - 2.6 mg/dL   CBC with platelets and differential    Collection Time: 04/20/22  6:20 AM   Result Value Ref Range    WBC Count 22.1 (H) 4.0 - 11.0 10e3/uL    RBC Count 3.65 (L) 3.80 - 5.20 10e6/uL    Hemoglobin 10.4 (L) 11.7 - 15.7 g/dL    Hematocrit 33.0 (L) 35.0 - 47.0 %    MCV 90 78 - 100 fL    MCH 28.5 26.5 - 33.0 pg    MCHC 31.5 31.5 - 36.5 g/dL    RDW 15.0 10.0 - 15.0 %    Platelet Count 403 150 - 450 10e3/uL    % Neutrophils 80 %    % Lymphocytes 9 %    % Monocytes 9 %    % Eosinophils 1 %    % Basophils 0 %    % Immature Granulocytes 1 %    NRBCs per 100 WBC 0 <1 /100    Absolute Neutrophils 17.7 (H) 1.6 - 8.3 10e3/uL    Absolute Lymphocytes 1.9 0.8 - 5.3 10e3/uL    Absolute Monocytes 2.0 (H) 0.0 - 1.3 10e3/uL    Absolute Eosinophils 0.1 0.0 - 0.7 10e3/uL    Absolute Basophils 0.0 0.0 - 0.2 10e3/uL    Absolute Immature Granulocytes 0.2 <=0.4 10e3/uL    Absolute NRBCs 0.0 10e3/uL   ECG 12-LEAD WITH MUSE (L[facility])    Collection Time: 04/20/22  2:29 PM   Result Value Ref Range    Systolic Blood Pressure  mmHg    Diastolic Blood Pressure  mmHg    Ventricular Rate 79 BPM    Atrial Rate 79 BPM    DE Interval 120 ms    QRS Duration 114 ms     ms    QTc 488 ms    P Axis 79 degrees    R AXIS -33 degrees    T Axis 98 degrees    Interpretation ECG       Sinus rhythm with Premature ventricular complexes  Left axis deviation  Incomplete left bundle branch block  Nonspecific ST and T wave abnormality  Prolonged QT  Abnormal  ECG  When compared with ECG of 15-APR-2022 05:41,  No significant change was found  Confirmed by MAKENNA WAYNE MD LOC: ([id number]) on 4/20/2022 3:56:31 PM     Basic metabolic panel    Collection Time: 04/21/22  6:08 AM   Result Value Ref Range    Sodium 144 136 - 145 mmol/L    Potassium 3.5 3.5 - 5.0 mmol/L    Chloride 111 (H) 98 - 107 mmol/L    Carbon Dioxide (CO2) 26 22 - 31 mmol/L    Anion Gap 7 5 - 18 mmol/L    Urea Nitrogen 17 8 - 28 mg/dL    Creatinine 0.79 0.60 - 1.10 mg/dL    Calcium 7.4 (L) 8.5 - 10.5 mg/dL    Glucose 75 70 - 125 mg/dL    GFR Estimate 77 >60 mL/min/1.73m2   CRP inflammation    Collection Time: 04/21/22  6:08 AM   Result Value Ref Range    CRP 4.8 (H) 0.0 - 0.8 mg/dL   Magnesium    Collection Time: 04/21/22  6:08 AM   Result Value Ref Range    Magnesium 2.1 1.8 - 2.6 mg/dL   Phosphorus    Collection Time: 04/21/22  6:08 AM   Result Value Ref Range    Phosphorus 3.2 2.5 - 4.5 mg/dL   Procalcitonin    Collection Time: 04/21/22  6:08 AM   Result Value Ref Range    Procalcitonin 0.07 0.00 - 0.49 ng/mL   CBC with platelets and differential    Collection Time: 04/21/22  6:08 AM   Result Value Ref Range    WBC Count 13.8 (H) 4.0 - 11.0 10e3/uL    RBC Count 3.07 (L) 3.80 - 5.20 10e6/uL    Hemoglobin 8.7 (L) 11.7 - 15.7 g/dL    Hematocrit 27.3 (L) 35.0 - 47.0 %    MCV 89 78 - 100 fL    MCH 28.3 26.5 - 33.0 pg    MCHC 31.9 31.5 - 36.5 g/dL    RDW 15.2 (H) 10.0 - 15.0 %    Platelet Count 352 150 - 450 10e3/uL    % Neutrophils 77 %    % Lymphocytes 11 %    % Monocytes 10 %    % Eosinophils 1 %    % Basophils 0 %    % Immature Granulocytes 1 %    NRBCs per 100 WBC 0 <1 /100    Absolute Neutrophils 10.8 (H) 1.6 - 8.3 10e3/uL    Absolute Lymphocytes 1.4 0.8 - 5.3 10e3/uL    Absolute Monocytes 1.3 0.0 - 1.3 10e3/uL    Absolute Eosinophils 0.1 0.0 - 0.7 10e3/uL    Absolute Basophils 0.0 0.0 - 0.2 10e3/uL    Absolute Immature Granulocytes 0.1 <=0.4 10e3/uL    Absolute NRBCs 0.0 10e3/uL   Respiratory Aerobic  Bacterial Culture    Collection Time: 04/21/22  9:13 AM    Specimen: Expectorate; Sputum   Result Value Ref Range    Culture 3+ Normal john     Culture 4+ Candida albicans (A)     Gram Stain Result <10 Squamous epithelial cells/low power field     Gram Stain Result >25 PMNs/low power field     Gram Stain Result 2+ Mixed john    Histoplasma Galactomannan Antigen Quant by EIA, Urine    Collection Time: 04/22/22 12:22 AM   Result Value Ref Range    Histoplasma Galactomannan Ag Quant, Urn Not Detected ng/mL    Histoplasma Galactomannan Ag Interp, Urn Not Detected Not Detected   Basic metabolic panel    Collection Time: 04/22/22  6:46 AM   Result Value Ref Range    Sodium 145 136 - 145 mmol/L    Potassium 3.8 3.5 - 5.0 mmol/L    Chloride 108 (H) 98 - 107 mmol/L    Carbon Dioxide (CO2) 29 22 - 31 mmol/L    Anion Gap 8 5 - 18 mmol/L    Urea Nitrogen 20 8 - 28 mg/dL    Creatinine 0.86 0.60 - 1.10 mg/dL    Calcium 7.6 (L) 8.5 - 10.5 mg/dL    Glucose 76 70 - 125 mg/dL    GFR Estimate 69 >60 mL/min/1.73m2   CRP inflammation    Collection Time: 04/22/22  6:46 AM   Result Value Ref Range    CRP 5.0 (H) 0.0 - 0.8 mg/dL   CBC with platelets and differential    Collection Time: 04/22/22  6:46 AM   Result Value Ref Range    WBC Count 13.5 (H) 4.0 - 11.0 10e3/uL    RBC Count 2.92 (L) 3.80 - 5.20 10e6/uL    Hemoglobin 8.3 (L) 11.7 - 15.7 g/dL    Hematocrit 27.1 (L) 35.0 - 47.0 %    MCV 93 78 - 100 fL    MCH 28.4 26.5 - 33.0 pg    MCHC 30.6 (L) 31.5 - 36.5 g/dL    RDW 15.0 10.0 - 15.0 %    Platelet Count 338 150 - 450 10e3/uL    % Neutrophils 74 %    % Lymphocytes 13 %    % Monocytes 11 %    % Eosinophils 1 %    % Basophils 0 %    % Immature Granulocytes 1 %    NRBCs per 100 WBC 0 <1 /100    Absolute Neutrophils 10.1 (H) 1.6 - 8.3 10e3/uL    Absolute Lymphocytes 1.7 0.8 - 5.3 10e3/uL    Absolute Monocytes 1.4 (H) 0.0 - 1.3 10e3/uL    Absolute Eosinophils 0.1 0.0 - 0.7 10e3/uL    Absolute Basophils 0.0 0.0 - 0.2 10e3/uL    Absolute  Immature Granulocytes 0.1 <=0.4 10e3/uL    Absolute NRBCs 0.0 10e3/uL   Asymptomatic COVID-19 Virus (Coronavirus) by PCR Nose    Collection Time: 04/22/22  9:57 AM    Specimen: Nose; Swab   Result Value Ref Range    SARS CoV2 PCR Negative Negative   Comprehensive metabolic panel    Collection Time: 04/26/22  8:34 AM   Result Value Ref Range    Sodium 143 136 - 145 mmol/L    Potassium 4.3 3.5 - 5.0 mmol/L    Chloride 109 (H) 98 - 107 mmol/L    Carbon Dioxide (CO2) 26 22 - 31 mmol/L    Anion Gap 8 5 - 18 mmol/L    Urea Nitrogen 18 8 - 28 mg/dL    Creatinine 0.99 0.60 - 1.10 mg/dL    Calcium 8.1 (L) 8.5 - 10.5 mg/dL    Glucose 73 70 - 125 mg/dL    Alkaline Phosphatase 66 45 - 120 U/L    AST 17 0 - 40 U/L    ALT <9 0 - 45 U/L    Protein Total 5.6 (L) 6.0 - 8.0 g/dL    Albumin 2.0 (L) 3.5 - 5.0 g/dL    Bilirubin Total 0.3 0.0 - 1.0 mg/dL    GFR Estimate 58 (L) >60 mL/min/1.73m2   CRP inflammation    Collection Time: 04/26/22  8:34 AM   Result Value Ref Range    CRP 2.7 (H) 0.0 - 0.8 mg/dL   CBC with platelets and differential    Collection Time: 04/26/22  8:34 AM   Result Value Ref Range    WBC Count 11.2 (H) 4.0 - 11.0 10e3/uL    RBC Count 3.22 (L) 3.80 - 5.20 10e6/uL    Hemoglobin 9.3 (L) 11.7 - 15.7 g/dL    Hematocrit 29.9 (L) 35.0 - 47.0 %    MCV 93 78 - 100 fL    MCH 28.9 26.5 - 33.0 pg    MCHC 31.1 (L) 31.5 - 36.5 g/dL    RDW 14.9 10.0 - 15.0 %    Platelet Count 295 150 - 450 10e3/uL    % Neutrophils 71 %    % Lymphocytes 15 %    % Monocytes 10 %    % Eosinophils 2 %    % Basophils 1 %    % Immature Granulocytes 1 %    NRBCs per 100 WBC 0 <1 /100    Absolute Neutrophils 8.0 1.6 - 8.3 10e3/uL    Absolute Lymphocytes 1.7 0.8 - 5.3 10e3/uL    Absolute Monocytes 1.2 0.0 - 1.3 10e3/uL    Absolute Eosinophils 0.2 0.0 - 0.7 10e3/uL    Absolute Basophils 0.1 0.0 - 0.2 10e3/uL    Absolute Immature Granulocytes 0.1 <=0.4 10e3/uL    Absolute NRBCs 0.0 10e3/uL           Electronically signed by    Paola Rose MD                              Sincerely,        VASHTI Sanchez

## 2022-04-29 LAB — A FUMIGATUS IGG SER-MCNC: 80.4 MCG/ML

## 2022-04-30 LAB
A FUMIGATUS IGE QN: 0.9 KU/L
A FUMIGATUS1 AB SER QL ID: ABNORMAL
A FUMIGATUS6 AB SER QL ID: ABNORMAL
IGE SERPL-ACNC: 495 KU/L

## 2022-05-01 ENCOUNTER — LAB REQUISITION (OUTPATIENT)
Dept: LAB | Facility: CLINIC | Age: 77
End: 2022-05-01
Payer: MEDICARE

## 2022-05-01 DIAGNOSIS — J44.9 CHRONIC OBSTRUCTIVE PULMONARY DISEASE, UNSPECIFIED (H): ICD-10-CM

## 2022-05-02 ENCOUNTER — HOSPITAL ENCOUNTER (EMERGENCY)
Facility: CLINIC | Age: 77
End: 2022-05-02
Payer: MEDICARE

## 2022-05-02 ENCOUNTER — TRANSITIONAL CARE UNIT VISIT (OUTPATIENT)
Dept: GERIATRICS | Facility: CLINIC | Age: 77
End: 2022-05-02
Payer: MEDICARE

## 2022-05-02 VITALS
WEIGHT: 114 LBS | DIASTOLIC BLOOD PRESSURE: 73 MMHG | OXYGEN SATURATION: 98 % | HEIGHT: 59 IN | RESPIRATION RATE: 19 BRPM | SYSTOLIC BLOOD PRESSURE: 115 MMHG | BODY MASS INDEX: 22.98 KG/M2 | TEMPERATURE: 98.1 F | HEART RATE: 90 BPM

## 2022-05-02 DIAGNOSIS — J43.9 PULMONARY EMPHYSEMA, UNSPECIFIED EMPHYSEMA TYPE (H): Primary | ICD-10-CM

## 2022-05-02 DIAGNOSIS — R53.81 PHYSICAL DECONDITIONING: ICD-10-CM

## 2022-05-02 LAB
ALBUMIN SERPL-MCNC: 2.4 G/DL (ref 3.5–5)
ALP SERPL-CCNC: 75 U/L (ref 45–120)
ALT SERPL W P-5'-P-CCNC: <9 U/L (ref 0–45)
ANION GAP SERPL CALCULATED.3IONS-SCNC: 8 MMOL/L (ref 5–18)
AST SERPL W P-5'-P-CCNC: 18 U/L (ref 0–40)
BASOPHILS # BLD AUTO: 0.1 10E3/UL (ref 0–0.2)
BASOPHILS NFR BLD AUTO: 1 %
BILIRUB SERPL-MCNC: 0.4 MG/DL (ref 0–1)
BUN SERPL-MCNC: 16 MG/DL (ref 8–28)
C REACTIVE PROTEIN LHE: 5.2 MG/DL (ref 0–0.8)
CALCIUM SERPL-MCNC: 8.8 MG/DL (ref 8.5–10.5)
CHLORIDE BLD-SCNC: 107 MMOL/L (ref 98–107)
CO2 SERPL-SCNC: 26 MMOL/L (ref 22–31)
CREAT SERPL-MCNC: 1.11 MG/DL (ref 0.6–1.1)
EOSINOPHIL # BLD AUTO: 0.6 10E3/UL (ref 0–0.7)
EOSINOPHIL NFR BLD AUTO: 6 %
ERYTHROCYTE [DISTWIDTH] IN BLOOD BY AUTOMATED COUNT: 15.3 % (ref 10–15)
GFR SERPL CREATININE-BSD FRML MDRD: 51 ML/MIN/1.73M2
GLUCOSE BLD-MCNC: 76 MG/DL (ref 70–125)
HCT VFR BLD AUTO: 33.2 % (ref 35–47)
HGB BLD-MCNC: 10.2 G/DL (ref 11.7–15.7)
IMM GRANULOCYTES # BLD: 0 10E3/UL
IMM GRANULOCYTES NFR BLD: 0 %
LYMPHOCYTES # BLD AUTO: 1.3 10E3/UL (ref 0.8–5.3)
LYMPHOCYTES NFR BLD AUTO: 13 %
MCH RBC QN AUTO: 28.8 PG (ref 26.5–33)
MCHC RBC AUTO-ENTMCNC: 30.7 G/DL (ref 31.5–36.5)
MCV RBC AUTO: 94 FL (ref 78–100)
MONOCYTES # BLD AUTO: 1.2 10E3/UL (ref 0–1.3)
MONOCYTES NFR BLD AUTO: 12 %
NEUTROPHILS # BLD AUTO: 6.9 10E3/UL (ref 1.6–8.3)
NEUTROPHILS NFR BLD AUTO: 68 %
NRBC # BLD AUTO: 0 10E3/UL
NRBC BLD AUTO-RTO: 0 /100
PLATELET # BLD AUTO: 289 10E3/UL (ref 150–450)
POTASSIUM BLD-SCNC: 5 MMOL/L (ref 3.5–5)
PROT SERPL-MCNC: 6.9 G/DL (ref 6–8)
RBC # BLD AUTO: 3.54 10E6/UL (ref 3.8–5.2)
SODIUM SERPL-SCNC: 141 MMOL/L (ref 136–145)
WBC # BLD AUTO: 10.1 10E3/UL (ref 4–11)

## 2022-05-02 PROCEDURE — P9604 ONE-WAY ALLOW PRORATED TRIP: HCPCS

## 2022-05-02 PROCEDURE — 86140 C-REACTIVE PROTEIN: CPT

## 2022-05-02 PROCEDURE — 82040 ASSAY OF SERUM ALBUMIN: CPT

## 2022-05-02 PROCEDURE — 85025 COMPLETE CBC W/AUTO DIFF WBC: CPT

## 2022-05-02 PROCEDURE — 80053 COMPREHEN METABOLIC PANEL: CPT

## 2022-05-02 PROCEDURE — 99309 SBSQ NF CARE MODERATE MDM 30: CPT | Performed by: NURSE PRACTITIONER

## 2022-05-02 PROCEDURE — 36415 COLL VENOUS BLD VENIPUNCTURE: CPT

## 2022-05-02 NOTE — LETTER
5/2/2022        RE: Irene León  [address] [facility] [address]         [facility] GERIATRIC SERVICES  Chief Complaint   Patient presents with     ELENA     [facility] Medical Record Number:  [id number]  Place of Service where encounter took place:  [facility] ([facility]) [[id number]]  Code Status:  No CPR- Do NOT Intubate     HISTORY:      HPI:  Irene León  is 77 year old (1945) undergoing physical and occupational therapy.  She is with PMHx heart failure,LBBB, HTN, factor V Leiden, TIA, and atrial fibrillation on Xarelto, CKD stage 3, and 35+ lb unintentional weight loss in the past one year.Per hospital records she admitted on 4/7/2022 for community acquired pneumonia in the setting of severe COPD on baseline 2L O2 by NC.  Well. Hospitalization was initially complicated by resolved delirium, followed by frequent stooling and diagnosis of C difficile diarrhea. After completing a course of antibiotics for pneumonia, she experienced acute worsening of respiratory status and repeat CT chest revealed interim development of a 5cm left upper lobe cavitation. Infectious disease and pulmonology were consulted.  Antibiotics were broadened to zosyn then transitioned to meropenem.  Due to the patient's baseline poor pulmonary status, bronchoscopy was discussed but ultimately decided with her clinical improvement the risk would be great.  The patient declined bronchoscopy as well.      Clinically, following initiation of meropenem for her pulmonary abscess and fidaxomicin for her C. difficile infection, her WBC and CRP continue to improve. She was titrated to her home baseline 2 L oxygen continuously.   Per pulmonology recommendations, outpatient pulmonary rehab would be recommended as well as CT chest in 2 weeks.     Per infectious disease recommendations, she should continue on 3 weeks of IV meropenem, she should have repeat CT of chest in 2 weeks and follow-up appointment with infectious disease in 2 to  3 weeks to discuss imaging and if further antibiotics intravenously will be necessary.  Plan for weekly CBC CMP CRP monitoring.  Per infectious disease, plan for vancomycin tapering course with titration over the next 2 months, and completion of fidaxomicin 10-day course.     Early in her hospitalization she had a brief episode of hospital related delirium.  Medications were adjusted and her Vesicare and Myrbetriq were ultimately held due to the increased risk of delirium associated.  I recommended not resuming these medications, given she is on a diuretic. The family would like to consider resuming at discharge from transitional care facility.    Due to her anxiety in the setting of acute illness, mirtazapine 15 mg nightly was started.  She is tolerating this well, and has seen improvement in sleep and baseline anxiety levels.    After discussions with palliative care and pulmonology, her CODE STATUS was made DNR/DNI.    Today she is seen to review vital signs, labs, follow-up COPD exacerbation and for a routine visit.  She denied chest pain. Today she was more short of breath than usual. after returning from the bathroom her oxygen went down to 87% and she rebounded within a minute to 93% on her 3 L nasal cannula  She has been on oxygen for approximately 1 year per her report.  She denied constipation diarrhea.  Her bowel movements are now formed.  She is on oral vancomycin taper.  She continues on IV antibiotics meropenem until 5/13/2022 and being followed by infectious disease.  She will have a follow-up CT of her chest done in 2 weeks..  Writer later received a call that she had a skin tear that was profusely bleeding and was sent to the ER.      ALLERGIES:Sulfa (sulfonamide antibiotics) [sulfa drugs], Homeopathic drugs [external allergen needs reconciliation - see comment], Mold extracts [molds & smuts], Morphine (pf) [morphine], Lisinopril, and Sulfacetamide sodium [sulfacetamide]    PAST MEDICAL HISTORY:    Past Medical History:   Diagnosis Date     ANATOLIY (acute kidney injury) (H)      Allergic rhinitis      Arrhythmia      Atrial fibrillation with RVR (H)      Bacteremia      Breast cancer (H) 2017     Cardiomyopathy (H)      Centrilobular emphysema (H)      CHF (congestive heart failure) (H)      CKD (chronic kidney disease)      COPD (chronic obstructive pulmonary disease) (H)      Coronary artery disease      Depression      Dysphagia      E. coli sepsis (H)      Factor 5 Leiden mutation, heterozygous (H)      GERD (gastroesophageal reflux disease)      Hx of radiation therapy 2017     Hyperlipidemia      Hypertension      Hypokalemia      Hypomagnesemia      Idiopathic cardiomyopathy (H)      Left hip pain 4/30/2014     OAB (overactive bladder)      Osteoporosis      Sacral insufficiency fracture      Sinusitis      Syncope      Urge incontinence      Vocal cord dysfunction        PAST SURGICAL HISTORY:   has a past surgical history that includes IR Abdominal Aortogram (4/16/2003); IR Miscellaneous Procedure (4/16/2003); IR Aortic Arch 4 Vessel Angiogram (4/16/2003); Arthroplasty revision hip (Left); OPEN TX FEMORAL FRACTURE DISTAL MED/LAT CONDYLE (Left, 10/28/2015); Cardiac catheterization; Cataract Extraction (Left, 07/18/2017); Biopsy breast (Right, 2017); Bladder surgery; Lumpectomy breast (Left, 06/2017); PICC/Midline Placement (4/7/2022); and PICC/Midline Placement (4/11/2022).    FAMILY HISTORY: family history includes Breast Cancer in her maternal aunt; Osteoporosis in an other family member; Prostate Cancer in her brother and maternal uncle.    SOCIAL HISTORY:  reports that she quit smoking about 14 years ago. She quit smokeless tobacco use about 17 years ago. She reports that she does not drink alcohol and does not use drugs.    ROS:  Constitutional: Negative for activity change, appetite change, fatigue and fever.   HENT: Negative for congestion.    Respiratory: Negative for cough,  positive shortness  "of breath and wheezing. Continuous oxygen 3l NC   Cardiovascular: Negative for chest pain and leg swelling.   Gastrointestinal: Negative for abdominal distention, abdominal pain, constipation, diarrhea and nausea. Recent C- diff, BM's now formed per staff  Genitourinary: Negative for dysuria.   Musculoskeletal: Negative for arthralgia. Negative for back pain.   Skin: Negative for color change and wound.   Neurological: Negative for dizziness.   Psychiatric/Behavioral: Negative for agitation, behavioral problems and confusion.     Physical Exam:  Constitutional:       Appearance: Patient is well-developed.   HENT:      Head: Normocephalic.   Eyes:      Conjunctiva/sclera: Conjunctivae normal.   Neck:      Musculoskeletal: Normal range of motion.   Cardiovascular:      Rate and Rhythm: Normal rate and regular rhythm.      Heart sounds: Normal heart sounds. No murmur.   Pulmonary:      Effort: No respiratory distress.      Breath sounds: Normal breath sounds. No wheezing or rales.   Abdominal:      General: Bowel sounds are normal. There is no distension.      Palpations: Abdomen is soft.      Tenderness: There is no abdominal tenderness.   Musculoskeletal:       Normal range of motion.     Skin:General:        Skin is warm.   Neurological:         Mental Status: Patient is alert and oriented to person, place, and time.   Psychiatric:         Behavior: Behavior normal.     Vitals:/73   Pulse 90   Temp 98.1  F (36.7  C)   Resp 19   Ht 1.499 m (4' 11\")   Wt 51.7 kg (114 lb)   SpO2 98%   BMI 23.03 kg/m   and Body mass index is 23.03 kg/m .    Lab/Diagnostic data:   Recent Results (from the past 240 hour(s))   Comprehensive metabolic panel    Collection Time: 04/26/22  8:34 AM   Result Value Ref Range    Sodium 143 136 - 145 mmol/L    Potassium 4.3 3.5 - 5.0 mmol/L    Chloride 109 (H) 98 - 107 mmol/L    Carbon Dioxide (CO2) 26 22 - 31 mmol/L    Anion Gap 8 5 - 18 mmol/L    Urea Nitrogen 18 8 - 28 mg/dL    " Creatinine 0.99 0.60 - 1.10 mg/dL    Calcium 8.1 (L) 8.5 - 10.5 mg/dL    Glucose 73 70 - 125 mg/dL    Alkaline Phosphatase 66 45 - 120 U/L    AST 17 0 - 40 U/L    ALT <9 0 - 45 U/L    Protein Total 5.6 (L) 6.0 - 8.0 g/dL    Albumin 2.0 (L) 3.5 - 5.0 g/dL    Bilirubin Total 0.3 0.0 - 1.0 mg/dL    GFR Estimate 58 (L) >60 mL/min/1.73m2   CRP inflammation    Collection Time: 04/26/22  8:34 AM   Result Value Ref Range    CRP 2.7 (H) 0.0 - 0.8 mg/dL   CBC with platelets and differential    Collection Time: 04/26/22  8:34 AM   Result Value Ref Range    WBC Count 11.2 (H) 4.0 - 11.0 10e3/uL    RBC Count 3.22 (L) 3.80 - 5.20 10e6/uL    Hemoglobin 9.3 (L) 11.7 - 15.7 g/dL    Hematocrit 29.9 (L) 35.0 - 47.0 %    MCV 93 78 - 100 fL    MCH 28.9 26.5 - 33.0 pg    MCHC 31.1 (L) 31.5 - 36.5 g/dL    RDW 14.9 10.0 - 15.0 %    Platelet Count 295 150 - 450 10e3/uL    % Neutrophils 71 %    % Lymphocytes 15 %    % Monocytes 10 %    % Eosinophils 2 %    % Basophils 1 %    % Immature Granulocytes 1 %    NRBCs per 100 WBC 0 <1 /100    Absolute Neutrophils 8.0 1.6 - 8.3 10e3/uL    Absolute Lymphocytes 1.7 0.8 - 5.3 10e3/uL    Absolute Monocytes 1.2 0.0 - 1.3 10e3/uL    Absolute Eosinophils 0.2 0.0 - 0.7 10e3/uL    Absolute Basophils 0.1 0.0 - 0.2 10e3/uL    Absolute Immature Granulocytes 0.1 <=0.4 10e3/uL    Absolute NRBCs 0.0 10e3/uL   CRP inflammation    Collection Time: 05/02/22  7:04 AM   Result Value Ref Range    CRP 5.2 (H) 0.0 - 0.8 mg/dL   Comprehensive metabolic panel    Collection Time: 05/02/22  7:04 AM   Result Value Ref Range    Sodium 141 136 - 145 mmol/L    Potassium 5.0 3.5 - 5.0 mmol/L    Chloride 107 98 - 107 mmol/L    Carbon Dioxide (CO2) 26 22 - 31 mmol/L    Anion Gap 8 5 - 18 mmol/L    Urea Nitrogen 16 8 - 28 mg/dL    Creatinine 1.11 (H) 0.60 - 1.10 mg/dL    Calcium 8.8 8.5 - 10.5 mg/dL    Glucose 76 70 - 125 mg/dL    Alkaline Phosphatase 75 45 - 120 U/L    AST 18 0 - 40 U/L    ALT <9 0 - 45 U/L    Protein Total 6.9 6.0  - 8.0 g/dL    Albumin 2.4 (L) 3.5 - 5.0 g/dL    Bilirubin Total 0.4 0.0 - 1.0 mg/dL    GFR Estimate 51 (L) >60 mL/min/1.73m2   CBC with platelets and differential    Collection Time: 05/02/22  7:04 AM   Result Value Ref Range    WBC Count 10.1 4.0 - 11.0 10e3/uL    RBC Count 3.54 (L) 3.80 - 5.20 10e6/uL    Hemoglobin 10.2 (L) 11.7 - 15.7 g/dL    Hematocrit 33.2 (L) 35.0 - 47.0 %    MCV 94 78 - 100 fL    MCH 28.8 26.5 - 33.0 pg    MCHC 30.7 (L) 31.5 - 36.5 g/dL    RDW 15.3 (H) 10.0 - 15.0 %    Platelet Count 289 150 - 450 10e3/uL    % Neutrophils 68 %    % Lymphocytes 13 %    % Monocytes 12 %    % Eosinophils 6 %    % Basophils 1 %    % Immature Granulocytes 0 %    NRBCs per 100 WBC 0 <1 /100    Absolute Neutrophils 6.9 1.6 - 8.3 10e3/uL    Absolute Lymphocytes 1.3 0.8 - 5.3 10e3/uL    Absolute Monocytes 1.2 0.0 - 1.3 10e3/uL    Absolute Eosinophils 0.6 0.0 - 0.7 10e3/uL    Absolute Basophils 0.1 0.0 - 0.2 10e3/uL    Absolute Immature Granulocytes 0.0 <=0.4 10e3/uL    Absolute NRBCs 0.0 10e3/uL   There is    MEDICATIONS:     Review of your medicines          Accurate as of May 2, 2022 10:29 AM. If you have any questions, ask your nurse or doctor.            CONTINUE these medicines which may have CHANGED, or have new prescriptions. If we are uncertain of the size of tablets/capsules you have at home, strength may be listed as something that might have changed.      Dose / Directions   furosemide 20 MG tablet  Commonly known as: LASIX  This may have changed: how much to take  Used for: COPD exacerbation (H), Pulmonary emphysema, unspecified emphysema type (H), Acute on chronic diastolic congestive heart failure (H)      Dose: 20 mg  Take 1 tablet (20 mg) by mouth daily  Quantity: 30 tablet  Refills: 0        CONTINUE these medicines which have NOT CHANGED      Dose / Directions   acetaminophen 500 MG tablet  Commonly known as: TYLENOL      Dose: 1,000 mg  Take 1,000 mg by mouth every 8 hours as needed for mild pain or  fever  Refills: 0     albuterol (2.5 MG/3ML) 0.083% neb solution  Commonly known as: PROVENTIL  Used for: COPD exacerbation (H), Pulmonary emphysema, unspecified emphysema type (H)      Dose: 2.5 mg  Take 1 vial (2.5 mg) by nebulization every 2 hours as needed for shortness of breath / dyspnea  Quantity: 90 mL  Refills: 0     ascorbic acid 1000 MG Tabs tablet      Dose: 1,000 mg  [ASCORBIC ACID, VITAMIN C, (VITAMIN C) 1000 MG TABLET] Take 1,000 mg by mouth daily. Airborne 1 tab daily  Refills: 0     Atrovent HFA 17 MCG/ACT inhaler  Used for: Chronic obstructive pulmonary disease, unspecified COPD type (H)  Generic drug: ipratropium      [ATROVENT HFA 17 MCG/ACTUATION INHALER] USE 2 INHALATIONS EVERY 6 HOURS  Quantity: 77.4 g  Refills: 3     cetirizine 10 MG tablet  Commonly known as: zyrTEC      Dose: 10 mg  Take 10 mg by mouth daily as needed for allergies  Refills: 0     chlorhexidine 0.12 % solution  Commonly known as: PERIDEX      Dose: 15 mL  [CHLORHEXIDINE (PERIDEX) 0.12 % SOLUTION] Apply 15 mL to the mouth or throat at bedtime as needed.  Refills: 0     cholecalciferol 25 MCG (1000 UT) Tabs      Dose: 1,000 Units  [CHOLECALCIFEROL, VITAMIN D3, 1,000 UNIT TABLET] Take 1,000 Units by mouth daily.  Refills: 0     EPINEPHrine 0.3 MG/0.3ML injection 2-pack  Commonly known as: ANY BX GENERIC EQUIV      Dose: 0.3 mg  Inject 0.3 mg into the muscle as needed for anaphylaxis  Refills: 0     famotidine 20 MG tablet  Commonly known as: PEPCID  Used for: COPD exacerbation (H), Pulmonary emphysema, unspecified emphysema type (H)      Dose: 20 mg  Take 1 tablet (20 mg) by mouth At Bedtime  Quantity: 30 tablet  Refills: 0     fluticasone 50 MCG/ACT nasal spray  Commonly known as: FLONASE  Used for: COPD exacerbation (H), Pulmonary emphysema, unspecified emphysema type (H)      Dose: 1 spray  Spray 1 spray into both nostrils daily  Quantity: 15.8 mL  Refills: 0     Fluticasone-Umeclidin-Vilanterol 200-62.5-25 MCG/INH oral  inhaler  Commonly known as: TRELEGY ELLIPTA      Dose: 1 puff  Inhale 1 puff into the lungs At Bedtime  Refills: 0     * gabapentin 300 MG capsule  Commonly known as: NEURONTIN  Used for: COPD exacerbation (H), Pulmonary emphysema, unspecified emphysema type (H)      Dose: 300 mg  Take 1 capsule (300 mg) by mouth At Bedtime  Quantity: 30 capsule  Refills: 0     * gabapentin 100 MG capsule  Commonly known as: NEURONTIN  Used for: COPD exacerbation (H), Pulmonary emphysema, unspecified emphysema type (H)      Dose: 200 mg  Take 2 capsules (200 mg) by mouth every morning  Quantity: 60 capsule  Refills: 0     * guaiFENesin 600 MG 12 hr tablet  Commonly known as: MUCINEX  Used for: COPD exacerbation (H), Pulmonary emphysema, unspecified emphysema type (H)      Dose: 1,200 mg  Take 2 tablets (1,200 mg) by mouth 2 times daily  Quantity: 60 tablet  Refills: 0     * guaiFENesin 20 mg/mL Soln solution  Commonly known as: ROBITUSSIN  Used for: COPD exacerbation (H), Pulmonary emphysema, unspecified emphysema type (H)      Dose: 10 mL  Take 10 mLs by mouth every 4 hours as needed for cough  Quantity: 118 mL  Refills: 0     ipratropium - albuterol 0.5 mg/2.5 mg/3 mL 0.5-2.5 (3) MG/3ML neb solution  Commonly known as: DUONEB  Used for: COPD exacerbation (H), Pulmonary emphysema, unspecified emphysema type (H)      Dose: 3 mL  Take 1 vial (3 mLs) by nebulization 4 times daily  Quantity: 90 mL  Refills: 0     lactobacillus rhamnosus (GG) capsule  Used for: COPD exacerbation (H), Pulmonary emphysema, unspecified emphysema type (H)      Dose: 1 capsule  Take 1 capsule by mouth 2 times daily  Quantity: 60 capsule  Refills: 0     melatonin 5 MG tablet  Used for: COPD exacerbation (H), Pulmonary emphysema, unspecified emphysema type (H)      Dose: 5 mg  Take 1 tablet (5 mg) by mouth At Bedtime  Quantity: 30 tablet  Refills: 0     menthol-zinc oxide 0.44-20.6 % Oint ointment  Commonly known as: CALMOSEPTINE  Used for: COPD exacerbation  (H), Pulmonary emphysema, unspecified emphysema type (H)      Apply topically 4 times daily as needed for skin protection  Quantity: 113 g  Refills: 0     meropenem 1 g vial  Commonly known as: MERREM  Indication: Abscess  Used for: Abscess of upper lobe of left lung with pneumonia (H)      Dose: 1 g  Inject 1,000 mg (1 g) into the vein every 12 hours for 21 days  Quantity: 42 each  Refills: 0     metoprolol succinate ER 25 MG 24 hr tablet  Commonly known as: TOPROL-XL  Used for: COPD exacerbation (H), Pulmonary emphysema, unspecified emphysema type (H), Acute on chronic diastolic congestive heart failure (H)      Dose: 75 mg  Take 3 tablets (75 mg) by mouth At Bedtime  Quantity: 90 tablet  Refills: 0     mirtazapine 15 MG tablet  Commonly known as: REMERON  Used for: COPD exacerbation (H), Pulmonary emphysema, unspecified emphysema type (H)      Dose: 15 mg  Take 1 tablet (15 mg) by mouth At Bedtime  Quantity: 30 tablet  Refills: 0     potassium chloride ER 10 MEQ CR tablet  Commonly known as: K-TAB/KLOR-CON  Used for: Hypokalemia      Dose: 10 mEq  Take 1 tablet (10 mEq) by mouth 2 times daily  Quantity: 180 tablet  Refills: 3     rivaroxaban ANTICOAGULANT 15 MG Tabs tablet  Commonly known as: XARELTO  Used for: History of stroke      Dose: 15 mg  Take 1 tablet (15 mg) by mouth At Bedtime  Quantity: 90 tablet  Refills: 3     simvastatin 40 MG tablet  Commonly known as: ZOCOR  Used for: Hypercholesterolemia      Dose: 40 mg  Take 1 tablet (40 mg) by mouth At Bedtime  Quantity: 90 tablet  Refills: 3     sodium chloride 0.9 % infusion      Inject into the vein continuous Use 10 ml intravenously two times a day for  flushing before and after each IV medication  administration. after IVAB  Refills: 0     tamoxifen 20 MG tablet  Commonly known as: NOLVADEX  Used for: Malignant neoplasm of central portion of left breast in female, estrogen receptor positive (H)      TAKE 1 TABLET DAILY  Quantity: 90 tablet  Refills: 3      traMADol 50 MG tablet  Commonly known as: ULTRAM  Used for: COPD exacerbation (H), Pulmonary emphysema, unspecified emphysema type (H)      Dose: 50 mg  Take 1 tablet (50 mg) by mouth 2 times daily  Quantity: 60 tablet  Refills: 0     * vancomycin 125 MG capsule  Commonly known as: VANCOCIN  Indication: Clostridium difficile Bacteria  Used for: C. difficile colitis      Dose: 125 mg  Take 1 capsule (125 mg) by mouth 4 times daily  Quantity: 56 capsule  Refills: 0     * vancomycin 125 MG capsule  Commonly known as: VANCOCIN  Indication: Clostridium difficile Bacteria  Used for: C. difficile colitis      Dose: 125 mg  Start taking on: May 5, 2022  Take 1 capsule (125 mg) by mouth 2 times daily  Quantity: 14 capsule  Refills: 0     * vancomycin 125 MG capsule  Commonly known as: VANCOCIN  Indication: Clostridium difficile Bacteria  Used for: C. difficile colitis      Dose: 125 mg  Start taking on: May 13, 2022  Take 1 capsule (125 mg) by mouth daily  Quantity: 7 capsule  Refills: 0     * vancomycin 125 MG capsule  Commonly known as: VANCOCIN  Indication: Clostridium difficile Bacteria  Used for: C. difficile colitis      Dose: 125 mg  Start taking on: May 20, 2022  Take 1 capsule (125 mg) by mouth every 48 hours  Quantity: 14 capsule  Refills: 0         * This list has 8 medication(s) that are the same as other medications prescribed for you. Read the directions carefully, and ask your doctor or other care provider to review them with you.                ASSESSMENT/PLAN  Encounter Diagnoses   Name Primary?     Pulmonary emphysema, unspecified emphysema type (H) Yes     Physical deconditioning      Pulmonary emphysema-completed prednisone ,Nebulizers and Atrovent  Inhaler QID, Ellipta    Physical deconditioning PT OT    Congestive heart failure - daily weights, Lasix    Hypertension Lasix decreased in the hospital to 20 mg daily, Continue 75 mg metoprolol succinate daily     C. difficile currently on a vancomycin  taper    Pain management - PRN Tramadol, PRN Tylenol, and scheduled Gabapentin     Left breast Cancer- On Tamoxifen, Follow up with Oncology    HDL  Zocor 40 mg HS    Anticoagulation management on Xarelto    Pneumonia On IV Merrem until 5/13/2022, Followed by Infectious Disease, Follow up chest CT in 2 weeks       Electronically signed by: Willa Sharpe CNP        Sincerely,        Willa Sharpe CNP

## 2022-05-02 NOTE — PROGRESS NOTES
[facility] GERIATRIC SERVICES  Chief Complaint   Patient presents with     ELENA     [facility] Medical Record Number:  [id number]  Place of Service where encounter took place:  [facility] ([facility]) [[id number]]  Code Status:  No CPR- Do NOT Intubate     HISTORY:      HPI:  Irene León  is 77 year old (1945) undergoing physical and occupational therapy.  She is with PMHx heart failure,LBBB, HTN, factor V Leiden, TIA, and atrial fibrillation on Xarelto, CKD stage 3, and 35+ lb unintentional weight loss in the past one year.Per hospital records she admitted on 4/7/2022 for community acquired pneumonia in the setting of severe COPD on baseline 2L O2 by NC.  Well. Hospitalization was initially complicated by resolved delirium, followed by frequent stooling and diagnosis of C difficile diarrhea. After completing a course of antibiotics for pneumonia, she experienced acute worsening of respiratory status and repeat CT chest revealed interim development of a 5cm left upper lobe cavitation. Infectious disease and pulmonology were consulted.  Antibiotics were broadened to zosyn then transitioned to meropenem.  Due to the patient's baseline poor pulmonary status, bronchoscopy was discussed but ultimately decided with her clinical improvement the risk would be great.  The patient declined bronchoscopy as well.      Clinically, following initiation of meropenem for her pulmonary abscess and fidaxomicin for her C. difficile infection, her WBC and CRP continue to improve. She was titrated to her home baseline 2 L oxygen continuously.   Per pulmonology recommendations, outpatient pulmonary rehab would be recommended as well as CT chest in 2 weeks.     Per infectious disease recommendations, she should continue on 3 weeks of IV meropenem, she should have repeat CT of chest in 2 weeks and follow-up appointment with infectious disease in 2 to 3 weeks to discuss imaging and if further antibiotics intravenously will be  necessary.  Plan for weekly CBC CMP CRP monitoring.  Per infectious disease, plan for vancomycin tapering course with titration over the next 2 months, and completion of fidaxomicin 10-day course.     Early in her hospitalization she had a brief episode of hospital related delirium.  Medications were adjusted and her Vesicare and Myrbetriq were ultimately held due to the increased risk of delirium associated.  I recommended not resuming these medications, given she is on a diuretic. The family would like to consider resuming at discharge from transitional care facility.    Due to her anxiety in the setting of acute illness, mirtazapine 15 mg nightly was started.  She is tolerating this well, and has seen improvement in sleep and baseline anxiety levels.    After discussions with palliative care and pulmonology, her CODE STATUS was made DNR/DNI.    Today she is seen to review vital signs, labs, follow-up COPD exacerbation and for a routine visit.  She denied chest pain. Today she was more short of breath than usual. after returning from the bathroom her oxygen went down to 87% and she rebounded within a minute to 93% on her 3 L nasal cannula  She has been on oxygen for approximately 1 year per her report.  She denied constipation diarrhea.  Her bowel movements are now formed.  She is on oral vancomycin taper.  She continues on IV antibiotics meropenem until 5/13/2022 and being followed by infectious disease.  She will have a follow-up CT of her chest done in 2 weeks..  Writer later received a call that she had a skin tear that was profusely bleeding and was sent to the ER.      ALLERGIES:Sulfa (sulfonamide antibiotics) [sulfa drugs], Homeopathic drugs [external allergen needs reconciliation - see comment], Mold extracts [molds & smuts], Morphine (pf) [morphine], Lisinopril, and Sulfacetamide sodium [sulfacetamide]    PAST MEDICAL HISTORY:   Past Medical History:   Diagnosis Date     ANATOLIY (acute kidney injury) (H)       Allergic rhinitis      Arrhythmia      Atrial fibrillation with RVR (H)      Bacteremia      Breast cancer (H) 2017     Cardiomyopathy (H)      Centrilobular emphysema (H)      CHF (congestive heart failure) (H)      CKD (chronic kidney disease)      COPD (chronic obstructive pulmonary disease) (H)      Coronary artery disease      Depression      Dysphagia      E. coli sepsis (H)      Factor 5 Leiden mutation, heterozygous (H)      GERD (gastroesophageal reflux disease)      Hx of radiation therapy 2017     Hyperlipidemia      Hypertension      Hypokalemia      Hypomagnesemia      Idiopathic cardiomyopathy (H)      Left hip pain 4/30/2014     OAB (overactive bladder)      Osteoporosis      Sacral insufficiency fracture      Sinusitis      Syncope      Urge incontinence      Vocal cord dysfunction        PAST SURGICAL HISTORY:   has a past surgical history that includes IR Abdominal Aortogram (4/16/2003); IR Miscellaneous Procedure (4/16/2003); IR Aortic Arch 4 Vessel Angiogram (4/16/2003); Arthroplasty revision hip (Left); OPEN TX FEMORAL FRACTURE DISTAL MED/LAT CONDYLE (Left, 10/28/2015); Cardiac catheterization; Cataract Extraction (Left, 07/18/2017); Biopsy breast (Right, 2017); Bladder surgery; Lumpectomy breast (Left, 06/2017); PICC/Midline Placement (4/7/2022); and PICC/Midline Placement (4/11/2022).    FAMILY HISTORY: family history includes Breast Cancer in her maternal aunt; Osteoporosis in an other family member; Prostate Cancer in her brother and maternal uncle.    SOCIAL HISTORY:  reports that she quit smoking about 14 years ago. She quit smokeless tobacco use about 17 years ago. She reports that she does not drink alcohol and does not use drugs.    ROS:  Constitutional: Negative for activity change, appetite change, fatigue and fever.   HENT: Negative for congestion.    Respiratory: Negative for cough,  positive shortness of breath and wheezing. Continuous oxygen 3l NC   Cardiovascular: Negative for  "chest pain and leg swelling.   Gastrointestinal: Negative for abdominal distention, abdominal pain, constipation, diarrhea and nausea. Recent C- diff, BM's now formed per staff  Genitourinary: Negative for dysuria.   Musculoskeletal: Negative for arthralgia. Negative for back pain.   Skin: Negative for color change and wound.   Neurological: Negative for dizziness.   Psychiatric/Behavioral: Negative for agitation, behavioral problems and confusion.     Physical Exam:  Constitutional:       Appearance: Patient is well-developed.   HENT:      Head: Normocephalic.   Eyes:      Conjunctiva/sclera: Conjunctivae normal.   Neck:      Musculoskeletal: Normal range of motion.   Cardiovascular:      Rate and Rhythm: Normal rate and regular rhythm.      Heart sounds: Normal heart sounds. No murmur.   Pulmonary:      Effort: No respiratory distress.      Breath sounds: Normal breath sounds. No wheezing or rales.   Abdominal:      General: Bowel sounds are normal. There is no distension.      Palpations: Abdomen is soft.      Tenderness: There is no abdominal tenderness.   Musculoskeletal:       Normal range of motion.     Skin:General:        Skin is warm.   Neurological:         Mental Status: Patient is alert and oriented to person, place, and time.   Psychiatric:         Behavior: Behavior normal.     Vitals:/73   Pulse 90   Temp 98.1  F (36.7  C)   Resp 19   Ht 1.499 m (4' 11\")   Wt 51.7 kg (114 lb)   SpO2 98%   BMI 23.03 kg/m   and Body mass index is 23.03 kg/m .    Lab/Diagnostic data:   Recent Results (from the past 240 hour(s))   Comprehensive metabolic panel    Collection Time: 04/26/22  8:34 AM   Result Value Ref Range    Sodium 143 136 - 145 mmol/L    Potassium 4.3 3.5 - 5.0 mmol/L    Chloride 109 (H) 98 - 107 mmol/L    Carbon Dioxide (CO2) 26 22 - 31 mmol/L    Anion Gap 8 5 - 18 mmol/L    Urea Nitrogen 18 8 - 28 mg/dL    Creatinine 0.99 0.60 - 1.10 mg/dL    Calcium 8.1 (L) 8.5 - 10.5 mg/dL    Glucose 73 " 70 - 125 mg/dL    Alkaline Phosphatase 66 45 - 120 U/L    AST 17 0 - 40 U/L    ALT <9 0 - 45 U/L    Protein Total 5.6 (L) 6.0 - 8.0 g/dL    Albumin 2.0 (L) 3.5 - 5.0 g/dL    Bilirubin Total 0.3 0.0 - 1.0 mg/dL    GFR Estimate 58 (L) >60 mL/min/1.73m2   CRP inflammation    Collection Time: 04/26/22  8:34 AM   Result Value Ref Range    CRP 2.7 (H) 0.0 - 0.8 mg/dL   CBC with platelets and differential    Collection Time: 04/26/22  8:34 AM   Result Value Ref Range    WBC Count 11.2 (H) 4.0 - 11.0 10e3/uL    RBC Count 3.22 (L) 3.80 - 5.20 10e6/uL    Hemoglobin 9.3 (L) 11.7 - 15.7 g/dL    Hematocrit 29.9 (L) 35.0 - 47.0 %    MCV 93 78 - 100 fL    MCH 28.9 26.5 - 33.0 pg    MCHC 31.1 (L) 31.5 - 36.5 g/dL    RDW 14.9 10.0 - 15.0 %    Platelet Count 295 150 - 450 10e3/uL    % Neutrophils 71 %    % Lymphocytes 15 %    % Monocytes 10 %    % Eosinophils 2 %    % Basophils 1 %    % Immature Granulocytes 1 %    NRBCs per 100 WBC 0 <1 /100    Absolute Neutrophils 8.0 1.6 - 8.3 10e3/uL    Absolute Lymphocytes 1.7 0.8 - 5.3 10e3/uL    Absolute Monocytes 1.2 0.0 - 1.3 10e3/uL    Absolute Eosinophils 0.2 0.0 - 0.7 10e3/uL    Absolute Basophils 0.1 0.0 - 0.2 10e3/uL    Absolute Immature Granulocytes 0.1 <=0.4 10e3/uL    Absolute NRBCs 0.0 10e3/uL   CRP inflammation    Collection Time: 05/02/22  7:04 AM   Result Value Ref Range    CRP 5.2 (H) 0.0 - 0.8 mg/dL   Comprehensive metabolic panel    Collection Time: 05/02/22  7:04 AM   Result Value Ref Range    Sodium 141 136 - 145 mmol/L    Potassium 5.0 3.5 - 5.0 mmol/L    Chloride 107 98 - 107 mmol/L    Carbon Dioxide (CO2) 26 22 - 31 mmol/L    Anion Gap 8 5 - 18 mmol/L    Urea Nitrogen 16 8 - 28 mg/dL    Creatinine 1.11 (H) 0.60 - 1.10 mg/dL    Calcium 8.8 8.5 - 10.5 mg/dL    Glucose 76 70 - 125 mg/dL    Alkaline Phosphatase 75 45 - 120 U/L    AST 18 0 - 40 U/L    ALT <9 0 - 45 U/L    Protein Total 6.9 6.0 - 8.0 g/dL    Albumin 2.4 (L) 3.5 - 5.0 g/dL    Bilirubin Total 0.4 0.0 - 1.0 mg/dL     GFR Estimate 51 (L) >60 mL/min/1.73m2   CBC with platelets and differential    Collection Time: 05/02/22  7:04 AM   Result Value Ref Range    WBC Count 10.1 4.0 - 11.0 10e3/uL    RBC Count 3.54 (L) 3.80 - 5.20 10e6/uL    Hemoglobin 10.2 (L) 11.7 - 15.7 g/dL    Hematocrit 33.2 (L) 35.0 - 47.0 %    MCV 94 78 - 100 fL    MCH 28.8 26.5 - 33.0 pg    MCHC 30.7 (L) 31.5 - 36.5 g/dL    RDW 15.3 (H) 10.0 - 15.0 %    Platelet Count 289 150 - 450 10e3/uL    % Neutrophils 68 %    % Lymphocytes 13 %    % Monocytes 12 %    % Eosinophils 6 %    % Basophils 1 %    % Immature Granulocytes 0 %    NRBCs per 100 WBC 0 <1 /100    Absolute Neutrophils 6.9 1.6 - 8.3 10e3/uL    Absolute Lymphocytes 1.3 0.8 - 5.3 10e3/uL    Absolute Monocytes 1.2 0.0 - 1.3 10e3/uL    Absolute Eosinophils 0.6 0.0 - 0.7 10e3/uL    Absolute Basophils 0.1 0.0 - 0.2 10e3/uL    Absolute Immature Granulocytes 0.0 <=0.4 10e3/uL    Absolute NRBCs 0.0 10e3/uL   There is    MEDICATIONS:     Review of your medicines          Accurate as of May 2, 2022 10:29 AM. If you have any questions, ask your nurse or doctor.            CONTINUE these medicines which may have CHANGED, or have new prescriptions. If we are uncertain of the size of tablets/capsules you have at home, strength may be listed as something that might have changed.      Dose / Directions   furosemide 20 MG tablet  Commonly known as: LASIX  This may have changed: how much to take  Used for: COPD exacerbation (H), Pulmonary emphysema, unspecified emphysema type (H), Acute on chronic diastolic congestive heart failure (H)      Dose: 20 mg  Take 1 tablet (20 mg) by mouth daily  Quantity: 30 tablet  Refills: 0        CONTINUE these medicines which have NOT CHANGED      Dose / Directions   acetaminophen 500 MG tablet  Commonly known as: TYLENOL      Dose: 1,000 mg  Take 1,000 mg by mouth every 8 hours as needed for mild pain or fever  Refills: 0     albuterol (2.5 MG/3ML) 0.083% neb solution  Commonly known  as: PROVENTIL  Used for: COPD exacerbation (H), Pulmonary emphysema, unspecified emphysema type (H)      Dose: 2.5 mg  Take 1 vial (2.5 mg) by nebulization every 2 hours as needed for shortness of breath / dyspnea  Quantity: 90 mL  Refills: 0     ascorbic acid 1000 MG Tabs tablet      Dose: 1,000 mg  [ASCORBIC ACID, VITAMIN C, (VITAMIN C) 1000 MG TABLET] Take 1,000 mg by mouth daily. Airborne 1 tab daily  Refills: 0     Atrovent HFA 17 MCG/ACT inhaler  Used for: Chronic obstructive pulmonary disease, unspecified COPD type (H)  Generic drug: ipratropium      [ATROVENT HFA 17 MCG/ACTUATION INHALER] USE 2 INHALATIONS EVERY 6 HOURS  Quantity: 77.4 g  Refills: 3     cetirizine 10 MG tablet  Commonly known as: zyrTEC      Dose: 10 mg  Take 10 mg by mouth daily as needed for allergies  Refills: 0     chlorhexidine 0.12 % solution  Commonly known as: PERIDEX      Dose: 15 mL  [CHLORHEXIDINE (PERIDEX) 0.12 % SOLUTION] Apply 15 mL to the mouth or throat at bedtime as needed.  Refills: 0     cholecalciferol 25 MCG (1000 UT) Tabs      Dose: 1,000 Units  [CHOLECALCIFEROL, VITAMIN D3, 1,000 UNIT TABLET] Take 1,000 Units by mouth daily.  Refills: 0     EPINEPHrine 0.3 MG/0.3ML injection 2-pack  Commonly known as: ANY BX GENERIC EQUIV      Dose: 0.3 mg  Inject 0.3 mg into the muscle as needed for anaphylaxis  Refills: 0     famotidine 20 MG tablet  Commonly known as: PEPCID  Used for: COPD exacerbation (H), Pulmonary emphysema, unspecified emphysema type (H)      Dose: 20 mg  Take 1 tablet (20 mg) by mouth At Bedtime  Quantity: 30 tablet  Refills: 0     fluticasone 50 MCG/ACT nasal spray  Commonly known as: FLONASE  Used for: COPD exacerbation (H), Pulmonary emphysema, unspecified emphysema type (H)      Dose: 1 spray  Spray 1 spray into both nostrils daily  Quantity: 15.8 mL  Refills: 0     Fluticasone-Umeclidin-Vilanterol 200-62.5-25 MCG/INH oral inhaler  Commonly known as: TRELEGY ELLIPTA      Dose: 1 puff  Inhale 1 puff into  the lungs At Bedtime  Refills: 0     * gabapentin 300 MG capsule  Commonly known as: NEURONTIN  Used for: COPD exacerbation (H), Pulmonary emphysema, unspecified emphysema type (H)      Dose: 300 mg  Take 1 capsule (300 mg) by mouth At Bedtime  Quantity: 30 capsule  Refills: 0     * gabapentin 100 MG capsule  Commonly known as: NEURONTIN  Used for: COPD exacerbation (H), Pulmonary emphysema, unspecified emphysema type (H)      Dose: 200 mg  Take 2 capsules (200 mg) by mouth every morning  Quantity: 60 capsule  Refills: 0     * guaiFENesin 600 MG 12 hr tablet  Commonly known as: MUCINEX  Used for: COPD exacerbation (H), Pulmonary emphysema, unspecified emphysema type (H)      Dose: 1,200 mg  Take 2 tablets (1,200 mg) by mouth 2 times daily  Quantity: 60 tablet  Refills: 0     * guaiFENesin 20 mg/mL Soln solution  Commonly known as: ROBITUSSIN  Used for: COPD exacerbation (H), Pulmonary emphysema, unspecified emphysema type (H)      Dose: 10 mL  Take 10 mLs by mouth every 4 hours as needed for cough  Quantity: 118 mL  Refills: 0     ipratropium - albuterol 0.5 mg/2.5 mg/3 mL 0.5-2.5 (3) MG/3ML neb solution  Commonly known as: DUONEB  Used for: COPD exacerbation (H), Pulmonary emphysema, unspecified emphysema type (H)      Dose: 3 mL  Take 1 vial (3 mLs) by nebulization 4 times daily  Quantity: 90 mL  Refills: 0     lactobacillus rhamnosus (GG) capsule  Used for: COPD exacerbation (H), Pulmonary emphysema, unspecified emphysema type (H)      Dose: 1 capsule  Take 1 capsule by mouth 2 times daily  Quantity: 60 capsule  Refills: 0     melatonin 5 MG tablet  Used for: COPD exacerbation (H), Pulmonary emphysema, unspecified emphysema type (H)      Dose: 5 mg  Take 1 tablet (5 mg) by mouth At Bedtime  Quantity: 30 tablet  Refills: 0     menthol-zinc oxide 0.44-20.6 % Oint ointment  Commonly known as: CALMOSEPTINE  Used for: COPD exacerbation (H), Pulmonary emphysema, unspecified emphysema type (H)      Apply topically 4  times daily as needed for skin protection  Quantity: 113 g  Refills: 0     meropenem 1 g vial  Commonly known as: MERREM  Indication: Abscess  Used for: Abscess of upper lobe of left lung with pneumonia (H)      Dose: 1 g  Inject 1,000 mg (1 g) into the vein every 12 hours for 21 days  Quantity: 42 each  Refills: 0     metoprolol succinate ER 25 MG 24 hr tablet  Commonly known as: TOPROL-XL  Used for: COPD exacerbation (H), Pulmonary emphysema, unspecified emphysema type (H), Acute on chronic diastolic congestive heart failure (H)      Dose: 75 mg  Take 3 tablets (75 mg) by mouth At Bedtime  Quantity: 90 tablet  Refills: 0     mirtazapine 15 MG tablet  Commonly known as: REMERON  Used for: COPD exacerbation (H), Pulmonary emphysema, unspecified emphysema type (H)      Dose: 15 mg  Take 1 tablet (15 mg) by mouth At Bedtime  Quantity: 30 tablet  Refills: 0     potassium chloride ER 10 MEQ CR tablet  Commonly known as: K-TAB/KLOR-CON  Used for: Hypokalemia      Dose: 10 mEq  Take 1 tablet (10 mEq) by mouth 2 times daily  Quantity: 180 tablet  Refills: 3     rivaroxaban ANTICOAGULANT 15 MG Tabs tablet  Commonly known as: XARELTO  Used for: History of stroke      Dose: 15 mg  Take 1 tablet (15 mg) by mouth At Bedtime  Quantity: 90 tablet  Refills: 3     simvastatin 40 MG tablet  Commonly known as: ZOCOR  Used for: Hypercholesterolemia      Dose: 40 mg  Take 1 tablet (40 mg) by mouth At Bedtime  Quantity: 90 tablet  Refills: 3     sodium chloride 0.9 % infusion      Inject into the vein continuous Use 10 ml intravenously two times a day for  flushing before and after each IV medication  administration. after IVAB  Refills: 0     tamoxifen 20 MG tablet  Commonly known as: NOLVADEX  Used for: Malignant neoplasm of central portion of left breast in female, estrogen receptor positive (H)      TAKE 1 TABLET DAILY  Quantity: 90 tablet  Refills: 3     traMADol 50 MG tablet  Commonly known as: ULTRAM  Used for: COPD exacerbation  (H), Pulmonary emphysema, unspecified emphysema type (H)      Dose: 50 mg  Take 1 tablet (50 mg) by mouth 2 times daily  Quantity: 60 tablet  Refills: 0     * vancomycin 125 MG capsule  Commonly known as: VANCOCIN  Indication: Clostridium difficile Bacteria  Used for: C. difficile colitis      Dose: 125 mg  Take 1 capsule (125 mg) by mouth 4 times daily  Quantity: 56 capsule  Refills: 0     * vancomycin 125 MG capsule  Commonly known as: VANCOCIN  Indication: Clostridium difficile Bacteria  Used for: C. difficile colitis      Dose: 125 mg  Start taking on: May 5, 2022  Take 1 capsule (125 mg) by mouth 2 times daily  Quantity: 14 capsule  Refills: 0     * vancomycin 125 MG capsule  Commonly known as: VANCOCIN  Indication: Clostridium difficile Bacteria  Used for: C. difficile colitis      Dose: 125 mg  Start taking on: May 13, 2022  Take 1 capsule (125 mg) by mouth daily  Quantity: 7 capsule  Refills: 0     * vancomycin 125 MG capsule  Commonly known as: VANCOCIN  Indication: Clostridium difficile Bacteria  Used for: C. difficile colitis      Dose: 125 mg  Start taking on: May 20, 2022  Take 1 capsule (125 mg) by mouth every 48 hours  Quantity: 14 capsule  Refills: 0         * This list has 8 medication(s) that are the same as other medications prescribed for you. Read the directions carefully, and ask your doctor or other care provider to review them with you.                ASSESSMENT/PLAN  Encounter Diagnoses   Name Primary?     Pulmonary emphysema, unspecified emphysema type (H) Yes     Physical deconditioning      Pulmonary emphysema-completed prednisone ,Nebulizers and Atrovent  Inhaler QID, Ellipta    Physical deconditioning PT OT    Congestive heart failure - daily weights, Lasix    Hypertension Lasix decreased in the hospital to 20 mg daily, Continue 75 mg metoprolol succinate daily     C. difficile currently on a vancomycin taper    Pain management - PRN Tramadol, PRN Tylenol, and scheduled Gabapentin      Left breast Cancer- On Tamoxifen, Follow up with Oncology    HDL  Zocor 40 mg HS    Anticoagulation management on Xarelto    Pneumonia On IV Merrem until 5/13/2022, Followed by Infectious Disease, Follow up chest CT in 2 weeks       Electronically signed by: Willa Sharpe CNP

## 2022-05-02 NOTE — ED NOTES
Expected Patient Referral to ED  1:16 PM    Referring Clinic/Provider:  [facility]    Reason for referral/Clinical facts:  77 F hx COPD, on xarelto, has a skin tear that wont stop bleeding. Tried steri strips but bled through.    Recommendations provided:  Send to ED for further evaluation    Caller was informed that this institution does possess the capabilities and/or resources to provide for patient and should be transferred to our facility.    Discussed that if direct admit is sought and any hurdles are encountered, this ED would be happy to see the patient and evaluate.    Informed caller that recommendations provided are recommendations based only on the facts provided and that they responsible to accept or reject the advice, or to seek a formal in person consultation as needed and that this ED will see/treat patient should they arrive.      Hannah Ferreira MD  [facility] EMERGENCY ROOM  [address] [place] [id number] [phone]       Hannah Ferreira MD  05/02/22 [id number]

## 2022-05-03 ENCOUNTER — MEDICAL CORRESPONDENCE (OUTPATIENT)
Dept: HEALTH INFORMATION MANAGEMENT | Facility: CLINIC | Age: 77
End: 2022-05-03

## 2022-05-05 ENCOUNTER — TRANSITIONAL CARE UNIT VISIT (OUTPATIENT)
Dept: GERIATRICS | Facility: CLINIC | Age: 77
End: 2022-05-05
Payer: MEDICARE

## 2022-05-05 VITALS
BODY MASS INDEX: 24.8 KG/M2 | OXYGEN SATURATION: 93 % | DIASTOLIC BLOOD PRESSURE: 61 MMHG | SYSTOLIC BLOOD PRESSURE: 123 MMHG | TEMPERATURE: 97.2 F | RESPIRATION RATE: 18 BRPM | HEART RATE: 73 BPM | WEIGHT: 123 LBS | HEIGHT: 59 IN

## 2022-05-05 DIAGNOSIS — R09.02 HYPOXIA: ICD-10-CM

## 2022-05-05 DIAGNOSIS — J43.9 PULMONARY EMPHYSEMA, UNSPECIFIED EMPHYSEMA TYPE (H): Primary | ICD-10-CM

## 2022-05-05 DIAGNOSIS — J85.1 ABSCESS OF UPPER LOBE OF LEFT LUNG WITH PNEUMONIA (H): ICD-10-CM

## 2022-05-05 DIAGNOSIS — I50.33 ACUTE ON CHRONIC DIASTOLIC CONGESTIVE HEART FAILURE (H): ICD-10-CM

## 2022-05-05 DIAGNOSIS — I10 ESSENTIAL HYPERTENSION: ICD-10-CM

## 2022-05-05 DIAGNOSIS — R63.4 WEIGHT LOSS: ICD-10-CM

## 2022-05-05 PROCEDURE — 99310 SBSQ NF CARE HIGH MDM 45: CPT | Performed by: FAMILY MEDICINE

## 2022-05-05 RX ORDER — FUROSEMIDE 40 MG
40 TABLET ORAL DAILY
COMMUNITY
End: 2022-06-30

## 2022-05-05 NOTE — LETTER
5/5/2022        RE: Irene León  [address] [facility] ([facility]3] [id number]        [facility]       Patient Irene León  MRN: [id number]        Reason for Visit     Chief Complaint   Patient presents with     RECHECK   Follow-up SOB/ LYMPHEDEMA    Code Status     DNR only    Assessment     Pneumonia  ZONIA Abscess/ cavitory pneumonia  COPD  Hypoxic respiratory failure on 2 L of oxygen  Acute metabolic encephalopathy/delirium  C. difficile colitis  LYMPHEDEMA  Severe anxiety  Wt loss > 40lb  Generalized weakness    Plan     Pt is admitted to TCU for strengthening and rehab.  Patient initially admitted with pneumonia in the setting of COPD and hypoxic respiratory failure.  She is stable on her current oxygen needs.  At her request her Lasix dosage was increased due to lymphedema concerns.  Weights have been stable.  Blood pressures however have been low and hold parameters have been given for antihypertensives  Patient had a fall with resultant skin tear in her right lower extremity.  This is being managed conservatively.  She was seen in the emergency room because of bleeding she is on Xarelto.  Today was complaining of some rectal bleeding.  She is very meant and adamant that this is not vaginal bleeding and did not allow vaginal exam.  Exam was done including surrounding skin with no active lesion or bleeding noted patient however reports that she can have spontaneous bleeding even when she is urinating.  She then reported bleeding and proceeded to show me her diapers which were noted not to have any blood in them  At this point we are assuring her that this is possibly because of her being on Xarelto however her labs have been stable  Nursing will continue to monitor her for any ongoing bleeding concerns  She will be reassessed at my next visit again for any bleeding concerns.  Extreme anxiety and frustration noted on the patient and reassurance given.  Time spent is 35 minutes including 22  minutes time spent reassuring this patient regarding her bleeding concerns.  I have requested nursing to monitor her for  Bleeding .  Patient continues to insist that she is bleeding and felt that she may be bleeding and felt that there is blood in her undergarments.  She showed me several of soiled undergarments but no bleeding was noted    History     Patient is a very pleasant 77 year old female who is admitted to TCU  Patient was admitted in the hospital with underlying history of COPD with pneumonia.  Her hospitalization was complicated by development of worsening respiratory status.  Repeat CT shows development of left upper lobe cavitation consideration was given for an abscess and pulmonary and infectious disease were involved in her care.  She was given broad-spectrum antibiotics  Patient declined bronchoscopy  She will follow-up with pulmonary  Denies any cough and hypoxia is improved  Recheck labs however show a jump and CRP again to greater than 5.  White count however stable  Course complicated by C. difficile infection for which she was also treated  Since then her diarrhea seems to have resolved nicely  At baseline has hypoxic respiratory failure and continues with 2 L of oxygen  She also has COPD and will continue with the nebulizer treatments for that she continues to have shortness of breath  Mood has been stable since she was taken off some of her medications.  Today she is adamant that she is having bleeding from a lesion in perianal area.  Examination was benign and patient was given reassurance she will not let me do a vaginal exam as she believes that there is no vagina bleeding  Reassurance given that no lesion was noted but patient remains adamant      Past Medical & Surgical History     PAST MEDICAL HISTORY:   Past Medical History:   Diagnosis Date     ANATOLIY (acute kidney injury) (H)      Allergic rhinitis      Arrhythmia      Atrial fibrillation with RVR (H)      Bacteremia      Breast  cancer (H) 2017     Cardiomyopathy (H)      Centrilobular emphysema (H)      CHF (congestive heart failure) (H)      CKD (chronic kidney disease)      COPD (chronic obstructive pulmonary disease) (H)      Coronary artery disease      Depression      Dysphagia      E. coli sepsis (H)      Factor 5 Leiden mutation, heterozygous (H)      GERD (gastroesophageal reflux disease)      Hx of radiation therapy 2017     Hyperlipidemia      Hypertension      Hypokalemia      Hypomagnesemia      Idiopathic cardiomyopathy (H)      Left hip pain 4/30/2014     OAB (overactive bladder)      Osteoporosis      Sacral insufficiency fracture      Sinusitis      Syncope      Urge incontinence      Vocal cord dysfunction       PAST SURGICAL HISTORY:   has a past surgical history that includes IR Abdominal Aortogram (4/16/2003); IR Miscellaneous Procedure (4/16/2003); IR Aortic Arch 4 Vessel Angiogram (4/16/2003); Arthroplasty revision hip (Left); OPEN TX FEMORAL FRACTURE DISTAL MED/LAT CONDYLE (Left, 10/28/2015); Cardiac catheterization; Cataract Extraction (Left, 07/18/2017); Biopsy breast (Right, 2017); Bladder surgery; Lumpectomy breast (Left, 06/2017); PICC/Midline Placement (4/7/2022); and PICC/Midline Placement (4/11/2022).      Past Social History     Reviewed,  reports that she quit smoking about 14 years ago. She quit smokeless tobacco use about 17 years ago. She reports that she does not drink alcohol and does not use drugs.    Family History     Reviewed, and family history includes Breast Cancer in her maternal aunt; Osteoporosis in an other family member; Prostate Cancer in her brother and maternal uncle.    Medication List   Post Discharge Medication Reconciliation Status: Post Discharge Medication Reconciliation Status: discharge medications reconciled, continue medications without change.  Current Outpatient Medications   Medication     acetaminophen (TYLENOL) 500 MG tablet     albuterol (PROVENTIL) (2.5 MG/3ML) 0.083%  neb solution     ascorbic acid, vitamin C, (VITAMIN C) 1000 MG tablet     cetirizine (ZYRTEC) 10 MG tablet     chlorhexidine (PERIDEX) 0.12 % solution     cholecalciferol, vitamin D3, 1,000 unit tablet     EPINEPHrine (EPIPEN/ADRENACLICK/AUVI-Q) 0.3 mg/0.3 mL injection     famotidine (PEPCID) 20 MG tablet     fluticasone (FLONASE) 50 MCG/ACT nasal spray     Fluticasone-Umeclidin-Vilanterol (TRELEGY ELLIPTA) 200-62.5-25 MCG/INH oral inhaler     furosemide (LASIX) 40 MG tablet     gabapentin (NEURONTIN) 100 MG capsule     gabapentin (NEURONTIN) 300 MG capsule     guaiFENesin (MUCINEX) 600 MG 12 hr tablet     ipratropium (ATROVENT HFA) 17 MCG/ACT inhaler     ipratropium - albuterol 0.5 mg/2.5 mg/3 mL (DUONEB) 0.5-2.5 (3) MG/3ML neb solution     lactobacillus rhamnosus, GG, (CULTURELL) capsule     melatonin 5 MG tablet     menthol-zinc oxide (CALMOSEPTINE) 0.44-20.6 % OINT ointment     meropenem (MERREM) 1 g vial     metoprolol succinate ER (TOPROL-XL) 25 MG 24 hr tablet     mirtazapine (REMERON) 15 MG tablet     potassium chloride ER (K-TAB/KLOR-CON) 10 MEQ CR tablet     rivaroxaban ANTICOAGULANT (XARELTO) 15 MG TABS tablet     simvastatin (ZOCOR) 40 MG tablet     SODIUM CHLORIDE 0.9 % IV SOLN     tamoxifen (NOLVADEX) 20 MG tablet     traMADol (ULTRAM) 50 MG tablet     vancomycin (VANCOCIN) 125 MG capsule     vancomycin (VANCOCIN) 125 MG capsule     [START ON 5/13/2022] vancomycin (VANCOCIN) 125 MG capsule     [START ON 5/20/2022] vancomycin (VANCOCIN) 125 MG capsule     furosemide (LASIX) 20 MG tablet     guaiFENesin (ROBITUSSIN) 20 mg/mL SOLN solution     No current facility-administered medications for this visit.          Allergies     Allergies   Allergen Reactions     Sulfa (Sulfonamide Antibiotics) [Sulfa Drugs] Rash     Headaches and dizziness.     Homeopathic Drugs [External Allergen Needs Reconciliation - See Comment] Unknown and Other (See Comments)     Comment: runny nose, Comment: runny nose     Mold  "Extracts [Molds & Smuts] Unknown     Morphine (Pf) [Morphine] Unknown     hallucinate     Lisinopril Cough, Unknown and Itching     Comment: cough, Comment: cough     Sulfacetamide Sodium [Sulfacetamide] Rash       Review of Systems   A comprehensive review of 14 systems was done. Pertinent findings noted here and in history of present illness. All the rest negative.  Constitutional: Negative.  Negative for fever, chills, she has  activity change, appetite change and fatigue.   But she had profound anorexia.  She reports a weight loss of more than 40 pounds in the last few weeks.  She does not feel like eating and continues to lose weight but now she states that she has gained a few pounds  HENT: Negative for congestion and facial swelling.    Eyes: Negative for photophobia, redness and visual disturbance.   Respiratory: Negative for  chest tightness.  Has cough with some expectoration  Cardiovascular: Negative for chest pain, palpitations and has chronic leg swelling.   Concerned about increased swelling  Cannot tolerate wraps   Blood pressures have been low  Does elevate her legs when resting  Gastrointestinal: Negative for nausea, diarrhea, constipation, blood in stool and abdominal distention.   Reporting bleeding even from a lesion in her rectal area even when she is urinating with copious bleeding reported  Genitourinary: Negative.    Musculoskeletal: Has chronic back issues due to compression fractures in the past  Skin: Negative.    Neurological: Negative for dizziness, tremors, syncope, weakness, light-headedness and headaches.   Endurance is limited and cannot stand for more than 10 minutes  Hematological: Does not bruise/bleed easily.   Psychiatric/Behavioral: Negative.  Mood/anxiety is better however today again anxiety and frustration noted patient gets anxious very easily      Physical Exam   /61   Pulse 73   Temp 97.2  F (36.2  C)   Resp 18   Ht 1.499 m (4' 11\")   Wt 55.8 kg (123 lb)   " SpO2 93%   BMI 24.84 kg/m     On 3 L oxygen  Constitutional: Oriented to person, place, and time and appears well-developed.   HEENT:  Normocephalic and atraumatic.  Eyes: Conjunctivae and EOM are normal. Pupils are equal, round, and reactive to light. No discharge.  No scleral icterus. Nose normal. Mouth/Throat: Oropharynx is clear and moist. No oropharyngeal exudate.    NECK: Normal range of motion. Neck supple. No JVD present. No tracheal deviation present. No thyromegaly present.   CARDIOVASCULAR: Normal rate, regular rhythm and intact distal pulses.  Exam reveals no gallop and no friction rub.  Systolic murmur present.  PULMONARY: Effort normal and breath sounds normal. No respiratory distress.No Wheezing or rales.  ABDOMEN: Soft. Bowel sounds are normal. No distension and no mass.  There is no tenderness. There is no rebound and no guarding. No HSM.  Perianal skin was examined there was no external thrombosed hemorrhoid noted  Skin is intact though slightly abraded from cleaning  Patient did not want a comprehensive vaginal exam but continues to insist that she is bleeding but no lesion was noted she believes she has a lesion there which is causing her to bleed  MUSCULOSKELETAL: Normal range of motion. no tenderness. Mild kyphosis, no tenderness.  LYMPH NODES: Has no cervical, supraclavicular, axillary and groin adenopathy.   NEUROLOGICAL: Alert and oriented to person, place, and time. No cranial nerve deficit.  Normal muscle tone. Coordination normal.   GENITOURINARY: Deferred exam.  SKIN: Skin is warm and dry. No rash noted. No erythema. No pallor.   Stasis dermatitis of both legs with edema and presence of scaling of skin and hyperpigmentary changes which are chronic  EXTREMITIES: No cyanosis, no clubbing, has 1+ lower extremity edema. No Deformity.  PSYCHIATRIC: Normal mood, affect and behavior.      Lab Results     Recent Results (from the past 240 hour(s))   Comprehensive metabolic panel    Collection  Time: 04/26/22  8:34 AM   Result Value Ref Range    Sodium 143 136 - 145 mmol/L    Potassium 4.3 3.5 - 5.0 mmol/L    Chloride 109 (H) 98 - 107 mmol/L    Carbon Dioxide (CO2) 26 22 - 31 mmol/L    Anion Gap 8 5 - 18 mmol/L    Urea Nitrogen 18 8 - 28 mg/dL    Creatinine 0.99 0.60 - 1.10 mg/dL    Calcium 8.1 (L) 8.5 - 10.5 mg/dL    Glucose 73 70 - 125 mg/dL    Alkaline Phosphatase 66 45 - 120 U/L    AST 17 0 - 40 U/L    ALT <9 0 - 45 U/L    Protein Total 5.6 (L) 6.0 - 8.0 g/dL    Albumin 2.0 (L) 3.5 - 5.0 g/dL    Bilirubin Total 0.3 0.0 - 1.0 mg/dL    GFR Estimate 58 (L) >60 mL/min/1.73m2   CRP inflammation    Collection Time: 04/26/22  8:34 AM   Result Value Ref Range    CRP 2.7 (H) 0.0 - 0.8 mg/dL   CBC with platelets and differential    Collection Time: 04/26/22  8:34 AM   Result Value Ref Range    WBC Count 11.2 (H) 4.0 - 11.0 10e3/uL    RBC Count 3.22 (L) 3.80 - 5.20 10e6/uL    Hemoglobin 9.3 (L) 11.7 - 15.7 g/dL    Hematocrit 29.9 (L) 35.0 - 47.0 %    MCV 93 78 - 100 fL    MCH 28.9 26.5 - 33.0 pg    MCHC 31.1 (L) 31.5 - 36.5 g/dL    RDW 14.9 10.0 - 15.0 %    Platelet Count 295 150 - 450 10e3/uL    % Neutrophils 71 %    % Lymphocytes 15 %    % Monocytes 10 %    % Eosinophils 2 %    % Basophils 1 %    % Immature Granulocytes 1 %    NRBCs per 100 WBC 0 <1 /100    Absolute Neutrophils 8.0 1.6 - 8.3 10e3/uL    Absolute Lymphocytes 1.7 0.8 - 5.3 10e3/uL    Absolute Monocytes 1.2 0.0 - 1.3 10e3/uL    Absolute Eosinophils 0.2 0.0 - 0.7 10e3/uL    Absolute Basophils 0.1 0.0 - 0.2 10e3/uL    Absolute Immature Granulocytes 0.1 <=0.4 10e3/uL    Absolute NRBCs 0.0 10e3/uL   CRP inflammation    Collection Time: 05/02/22  7:04 AM   Result Value Ref Range    CRP 5.2 (H) 0.0 - 0.8 mg/dL   Comprehensive metabolic panel    Collection Time: 05/02/22  7:04 AM   Result Value Ref Range    Sodium 141 136 - 145 mmol/L    Potassium 5.0 3.5 - 5.0 mmol/L    Chloride 107 98 - 107 mmol/L    Carbon Dioxide (CO2) 26 22 - 31 mmol/L    Anion Gap 8  5 - 18 mmol/L    Urea Nitrogen 16 8 - 28 mg/dL    Creatinine 1.11 (H) 0.60 - 1.10 mg/dL    Calcium 8.8 8.5 - 10.5 mg/dL    Glucose 76 70 - 125 mg/dL    Alkaline Phosphatase 75 45 - 120 U/L    AST 18 0 - 40 U/L    ALT <9 0 - 45 U/L    Protein Total 6.9 6.0 - 8.0 g/dL    Albumin 2.4 (L) 3.5 - 5.0 g/dL    Bilirubin Total 0.4 0.0 - 1.0 mg/dL    GFR Estimate 51 (L) >60 mL/min/1.73m2   CBC with platelets and differential    Collection Time: 05/02/22  7:04 AM   Result Value Ref Range    WBC Count 10.1 4.0 - 11.0 10e3/uL    RBC Count 3.54 (L) 3.80 - 5.20 10e6/uL    Hemoglobin 10.2 (L) 11.7 - 15.7 g/dL    Hematocrit 33.2 (L) 35.0 - 47.0 %    MCV 94 78 - 100 fL    MCH 28.8 26.5 - 33.0 pg    MCHC 30.7 (L) 31.5 - 36.5 g/dL    RDW 15.3 (H) 10.0 - 15.0 %    Platelet Count 289 150 - 450 10e3/uL    % Neutrophils 68 %    % Lymphocytes 13 %    % Monocytes 12 %    % Eosinophils 6 %    % Basophils 1 %    % Immature Granulocytes 0 %    NRBCs per 100 WBC 0 <1 /100    Absolute Neutrophils 6.9 1.6 - 8.3 10e3/uL    Absolute Lymphocytes 1.3 0.8 - 5.3 10e3/uL    Absolute Monocytes 1.2 0.0 - 1.3 10e3/uL    Absolute Eosinophils 0.6 0.0 - 0.7 10e3/uL    Absolute Basophils 0.1 0.0 - 0.2 10e3/uL    Absolute Immature Granulocytes 0.0 <=0.4 10e3/uL    Absolute NRBCs 0.0 10e3/uL           Electronically signed by    Paola Rose MD                             Sincerely,        VASHTI Sanchez

## 2022-05-05 NOTE — PROGRESS NOTES
[facility] GERIATRIC SERVICES       Patient Irene León  MRN: [id number]        Reason for Visit     Chief Complaint   Patient presents with     RECHECK   Follow-up SOB/ LYMPHEDEMA    Code Status     DNR only    Assessment     Pneumonia  ZONIA Abscess/ cavitory pneumonia  COPD  Hypoxic respiratory failure on 2 L of oxygen  Acute metabolic encephalopathy/delirium  C. difficile colitis  LYMPHEDEMA  Severe anxiety  Wt loss > 40lb  Generalized weakness    Plan     Pt is admitted to TCU for strengthening and rehab.  Patient initially admitted with pneumonia in the setting of COPD and hypoxic respiratory failure.  She is stable on her current oxygen needs.  At her request her Lasix dosage was increased due to lymphedema concerns.  Weights have been stable.  Blood pressures however have been low and hold parameters have been given for antihypertensives  Patient had a fall with resultant skin tear in her right lower extremity.  This is being managed conservatively.  She was seen in the emergency room because of bleeding she is on Xarelto.  Today was complaining of some rectal bleeding.  She is very meant and adamant that this is not vaginal bleeding and did not allow vaginal exam.  Exam was done including surrounding skin with no active lesion or bleeding noted patient however reports that she can have spontaneous bleeding even when she is urinating.  She then reported bleeding and proceeded to show me her diapers which were noted not to have any blood in them  At this point we are assuring her that this is possibly because of her being on Xarelto however her labs have been stable  Nursing will continue to monitor her for any ongoing bleeding concerns  She will be reassessed at my next visit again for any bleeding concerns.  Extreme anxiety and frustration noted on the patient and reassurance given.  Time spent is 35 minutes including 22 minutes time spent reassuring this patient regarding her bleeding concerns.  I have  requested nursing to monitor her for  Bleeding .  Patient continues to insist that she is bleeding and felt that she may be bleeding and felt that there is blood in her undergarments.  She showed me several of soiled undergarments but no bleeding was noted    History     Patient is a very pleasant 77 year old female who is admitted to TCU  Patient was admitted in the hospital with underlying history of COPD with pneumonia.  Her hospitalization was complicated by development of worsening respiratory status.  Repeat CT shows development of left upper lobe cavitation consideration was given for an abscess and pulmonary and infectious disease were involved in her care.  She was given broad-spectrum antibiotics  Patient declined bronchoscopy  She will follow-up with pulmonary  Denies any cough and hypoxia is improved  Recheck labs however show a jump and CRP again to greater than 5.  White count however stable  Course complicated by C. difficile infection for which she was also treated  Since then her diarrhea seems to have resolved nicely  At baseline has hypoxic respiratory failure and continues with 2 L of oxygen  She also has COPD and will continue with the nebulizer treatments for that she continues to have shortness of breath  Mood has been stable since she was taken off some of her medications.  Today she is adamant that she is having bleeding from a lesion in perianal area.  Examination was benign and patient was given reassurance she will not let me do a vaginal exam as she believes that there is no vagina bleeding  Reassurance given that no lesion was noted but patient remains adamant      Past Medical & Surgical History     PAST MEDICAL HISTORY:   Past Medical History:   Diagnosis Date     ANATOLIY (acute kidney injury) (H)      Allergic rhinitis      Arrhythmia      Atrial fibrillation with RVR (H)      Bacteremia      Breast cancer (H) 2017     Cardiomyopathy (H)      Centrilobular emphysema (H)      CHF  (congestive heart failure) (H)      CKD (chronic kidney disease)      COPD (chronic obstructive pulmonary disease) (H)      Coronary artery disease      Depression      Dysphagia      E. coli sepsis (H)      Factor 5 Leiden mutation, heterozygous (H)      GERD (gastroesophageal reflux disease)      Hx of radiation therapy 2017     Hyperlipidemia      Hypertension      Hypokalemia      Hypomagnesemia      Idiopathic cardiomyopathy (H)      Left hip pain 4/30/2014     OAB (overactive bladder)      Osteoporosis      Sacral insufficiency fracture      Sinusitis      Syncope      Urge incontinence      Vocal cord dysfunction       PAST SURGICAL HISTORY:   has a past surgical history that includes IR Abdominal Aortogram (4/16/2003); IR Miscellaneous Procedure (4/16/2003); IR Aortic Arch 4 Vessel Angiogram (4/16/2003); Arthroplasty revision hip (Left); OPEN TX FEMORAL FRACTURE DISTAL MED/LAT CONDYLE (Left, 10/28/2015); Cardiac catheterization; Cataract Extraction (Left, 07/18/2017); Biopsy breast (Right, 2017); Bladder surgery; Lumpectomy breast (Left, 06/2017); PICC/Midline Placement (4/7/2022); and PICC/Midline Placement (4/11/2022).      Past Social History     Reviewed,  reports that she quit smoking about 14 years ago. She quit smokeless tobacco use about 17 years ago. She reports that she does not drink alcohol and does not use drugs.    Family History     Reviewed, and family history includes Breast Cancer in her maternal aunt; Osteoporosis in an other family member; Prostate Cancer in her brother and maternal uncle.    Medication List   Post Discharge Medication Reconciliation Status: Post Discharge Medication Reconciliation Status: discharge medications reconciled, continue medications without change.  Current Outpatient Medications   Medication     acetaminophen (TYLENOL) 500 MG tablet     albuterol (PROVENTIL) (2.5 MG/3ML) 0.083% neb solution     ascorbic acid, vitamin C, (VITAMIN C) 1000 MG tablet      cetirizine (ZYRTEC) 10 MG tablet     chlorhexidine (PERIDEX) 0.12 % solution     cholecalciferol, vitamin D3, 1,000 unit tablet     EPINEPHrine (EPIPEN/ADRENACLICK/AUVI-Q) 0.3 mg/0.3 mL injection     famotidine (PEPCID) 20 MG tablet     fluticasone (FLONASE) 50 MCG/ACT nasal spray     Fluticasone-Umeclidin-Vilanterol (TRELEGY ELLIPTA) 200-62.5-25 MCG/INH oral inhaler     furosemide (LASIX) 40 MG tablet     gabapentin (NEURONTIN) 100 MG capsule     gabapentin (NEURONTIN) 300 MG capsule     guaiFENesin (MUCINEX) 600 MG 12 hr tablet     ipratropium (ATROVENT HFA) 17 MCG/ACT inhaler     ipratropium - albuterol 0.5 mg/2.5 mg/3 mL (DUONEB) 0.5-2.5 (3) MG/3ML neb solution     lactobacillus rhamnosus, GG, (CULTURELL) capsule     melatonin 5 MG tablet     menthol-zinc oxide (CALMOSEPTINE) 0.44-20.6 % OINT ointment     meropenem (MERREM) 1 g vial     metoprolol succinate ER (TOPROL-XL) 25 MG 24 hr tablet     mirtazapine (REMERON) 15 MG tablet     potassium chloride ER (K-TAB/KLOR-CON) 10 MEQ CR tablet     rivaroxaban ANTICOAGULANT (XARELTO) 15 MG TABS tablet     simvastatin (ZOCOR) 40 MG tablet     SODIUM CHLORIDE 0.9 % IV SOLN     tamoxifen (NOLVADEX) 20 MG tablet     traMADol (ULTRAM) 50 MG tablet     vancomycin (VANCOCIN) 125 MG capsule     vancomycin (VANCOCIN) 125 MG capsule     [START ON 5/13/2022] vancomycin (VANCOCIN) 125 MG capsule     [START ON 5/20/2022] vancomycin (VANCOCIN) 125 MG capsule     furosemide (LASIX) 20 MG tablet     guaiFENesin (ROBITUSSIN) 20 mg/mL SOLN solution     No current facility-administered medications for this visit.          Allergies     Allergies   Allergen Reactions     Sulfa (Sulfonamide Antibiotics) [Sulfa Drugs] Rash     Headaches and dizziness.     Homeopathic Drugs [External Allergen Needs Reconciliation - See Comment] Unknown and Other (See Comments)     Comment: runny nose, Comment: runny nose     Mold Extracts [Molds & Smuts] Unknown     Morphine (Pf) [Morphine] Unknown      "hallucinate     Lisinopril Cough, Unknown and Itching     Comment: cough, Comment: cough     Sulfacetamide Sodium [Sulfacetamide] Rash       Review of Systems   A comprehensive review of 14 systems was done. Pertinent findings noted here and in history of present illness. All the rest negative.  Constitutional: Negative.  Negative for fever, chills, she has  activity change, appetite change and fatigue.   But she had profound anorexia.  She reports a weight loss of more than 40 pounds in the last few weeks.  She does not feel like eating and continues to lose weight but now she states that she has gained a few pounds  HENT: Negative for congestion and facial swelling.    Eyes: Negative for photophobia, redness and visual disturbance.   Respiratory: Negative for  chest tightness.  Has cough with some expectoration  Cardiovascular: Negative for chest pain, palpitations and has chronic leg swelling.   Concerned about increased swelling  Cannot tolerate wraps   Blood pressures have been low  Does elevate her legs when resting  Gastrointestinal: Negative for nausea, diarrhea, constipation, blood in stool and abdominal distention.   Reporting bleeding even from a lesion in her rectal area even when she is urinating with copious bleeding reported  Genitourinary: Negative.    Musculoskeletal: Has chronic back issues due to compression fractures in the past  Skin: Negative.    Neurological: Negative for dizziness, tremors, syncope, weakness, light-headedness and headaches.   Endurance is limited and cannot stand for more than 10 minutes  Hematological: Does not bruise/bleed easily.   Psychiatric/Behavioral: Negative.  Mood/anxiety is better however today again anxiety and frustration noted patient gets anxious very easily      Physical Exam   /61   Pulse 73   Temp 97.2  F (36.2  C)   Resp 18   Ht 1.499 m (4' 11\")   Wt 55.8 kg (123 lb)   SpO2 93%   BMI 24.84 kg/m     On 3 L oxygen  Constitutional: Oriented to " person, place, and time and appears well-developed.   HEENT:  Normocephalic and atraumatic.  Eyes: Conjunctivae and EOM are normal. Pupils are equal, round, and reactive to light. No discharge.  No scleral icterus. Nose normal. Mouth/Throat: Oropharynx is clear and moist. No oropharyngeal exudate.    NECK: Normal range of motion. Neck supple. No JVD present. No tracheal deviation present. No thyromegaly present.   CARDIOVASCULAR: Normal rate, regular rhythm and intact distal pulses.  Exam reveals no gallop and no friction rub.  Systolic murmur present.  PULMONARY: Effort normal and breath sounds normal. No respiratory distress.No Wheezing or rales.  ABDOMEN: Soft. Bowel sounds are normal. No distension and no mass.  There is no tenderness. There is no rebound and no guarding. No HSM.  Perianal skin was examined there was no external thrombosed hemorrhoid noted  Skin is intact though slightly abraded from cleaning  Patient did not want a comprehensive vaginal exam but continues to insist that she is bleeding but no lesion was noted she believes she has a lesion there which is causing her to bleed  MUSCULOSKELETAL: Normal range of motion. no tenderness. Mild kyphosis, no tenderness.  LYMPH NODES: Has no cervical, supraclavicular, axillary and groin adenopathy.   NEUROLOGICAL: Alert and oriented to person, place, and time. No cranial nerve deficit.  Normal muscle tone. Coordination normal.   GENITOURINARY: Deferred exam.  SKIN: Skin is warm and dry. No rash noted. No erythema. No pallor.   Stasis dermatitis of both legs with edema and presence of scaling of skin and hyperpigmentary changes which are chronic  EXTREMITIES: No cyanosis, no clubbing, has 1+ lower extremity edema. No Deformity.  PSYCHIATRIC: Normal mood, affect and behavior.      Lab Results     Recent Results (from the past 240 hour(s))   Comprehensive metabolic panel    Collection Time: 04/26/22  8:34 AM   Result Value Ref Range    Sodium 143 136 - 145  mmol/L    Potassium 4.3 3.5 - 5.0 mmol/L    Chloride 109 (H) 98 - 107 mmol/L    Carbon Dioxide (CO2) 26 22 - 31 mmol/L    Anion Gap 8 5 - 18 mmol/L    Urea Nitrogen 18 8 - 28 mg/dL    Creatinine 0.99 0.60 - 1.10 mg/dL    Calcium 8.1 (L) 8.5 - 10.5 mg/dL    Glucose 73 70 - 125 mg/dL    Alkaline Phosphatase 66 45 - 120 U/L    AST 17 0 - 40 U/L    ALT <9 0 - 45 U/L    Protein Total 5.6 (L) 6.0 - 8.0 g/dL    Albumin 2.0 (L) 3.5 - 5.0 g/dL    Bilirubin Total 0.3 0.0 - 1.0 mg/dL    GFR Estimate 58 (L) >60 mL/min/1.73m2   CRP inflammation    Collection Time: 04/26/22  8:34 AM   Result Value Ref Range    CRP 2.7 (H) 0.0 - 0.8 mg/dL   CBC with platelets and differential    Collection Time: 04/26/22  8:34 AM   Result Value Ref Range    WBC Count 11.2 (H) 4.0 - 11.0 10e3/uL    RBC Count 3.22 (L) 3.80 - 5.20 10e6/uL    Hemoglobin 9.3 (L) 11.7 - 15.7 g/dL    Hematocrit 29.9 (L) 35.0 - 47.0 %    MCV 93 78 - 100 fL    MCH 28.9 26.5 - 33.0 pg    MCHC 31.1 (L) 31.5 - 36.5 g/dL    RDW 14.9 10.0 - 15.0 %    Platelet Count 295 150 - 450 10e3/uL    % Neutrophils 71 %    % Lymphocytes 15 %    % Monocytes 10 %    % Eosinophils 2 %    % Basophils 1 %    % Immature Granulocytes 1 %    NRBCs per 100 WBC 0 <1 /100    Absolute Neutrophils 8.0 1.6 - 8.3 10e3/uL    Absolute Lymphocytes 1.7 0.8 - 5.3 10e3/uL    Absolute Monocytes 1.2 0.0 - 1.3 10e3/uL    Absolute Eosinophils 0.2 0.0 - 0.7 10e3/uL    Absolute Basophils 0.1 0.0 - 0.2 10e3/uL    Absolute Immature Granulocytes 0.1 <=0.4 10e3/uL    Absolute NRBCs 0.0 10e3/uL   CRP inflammation    Collection Time: 05/02/22  7:04 AM   Result Value Ref Range    CRP 5.2 (H) 0.0 - 0.8 mg/dL   Comprehensive metabolic panel    Collection Time: 05/02/22  7:04 AM   Result Value Ref Range    Sodium 141 136 - 145 mmol/L    Potassium 5.0 3.5 - 5.0 mmol/L    Chloride 107 98 - 107 mmol/L    Carbon Dioxide (CO2) 26 22 - 31 mmol/L    Anion Gap 8 5 - 18 mmol/L    Urea Nitrogen 16 8 - 28 mg/dL    Creatinine 1.11 (H)  0.60 - 1.10 mg/dL    Calcium 8.8 8.5 - 10.5 mg/dL    Glucose 76 70 - 125 mg/dL    Alkaline Phosphatase 75 45 - 120 U/L    AST 18 0 - 40 U/L    ALT <9 0 - 45 U/L    Protein Total 6.9 6.0 - 8.0 g/dL    Albumin 2.4 (L) 3.5 - 5.0 g/dL    Bilirubin Total 0.4 0.0 - 1.0 mg/dL    GFR Estimate 51 (L) >60 mL/min/1.73m2   CBC with platelets and differential    Collection Time: 05/02/22  7:04 AM   Result Value Ref Range    WBC Count 10.1 4.0 - 11.0 10e3/uL    RBC Count 3.54 (L) 3.80 - 5.20 10e6/uL    Hemoglobin 10.2 (L) 11.7 - 15.7 g/dL    Hematocrit 33.2 (L) 35.0 - 47.0 %    MCV 94 78 - 100 fL    MCH 28.8 26.5 - 33.0 pg    MCHC 30.7 (L) 31.5 - 36.5 g/dL    RDW 15.3 (H) 10.0 - 15.0 %    Platelet Count 289 150 - 450 10e3/uL    % Neutrophils 68 %    % Lymphocytes 13 %    % Monocytes 12 %    % Eosinophils 6 %    % Basophils 1 %    % Immature Granulocytes 0 %    NRBCs per 100 WBC 0 <1 /100    Absolute Neutrophils 6.9 1.6 - 8.3 10e3/uL    Absolute Lymphocytes 1.3 0.8 - 5.3 10e3/uL    Absolute Monocytes 1.2 0.0 - 1.3 10e3/uL    Absolute Eosinophils 0.6 0.0 - 0.7 10e3/uL    Absolute Basophils 0.1 0.0 - 0.2 10e3/uL    Absolute Immature Granulocytes 0.0 <=0.4 10e3/uL    Absolute NRBCs 0.0 10e3/uL           Electronically signed by    Paola Rose MD

## 2022-05-06 ENCOUNTER — HOSPITAL ENCOUNTER (OUTPATIENT)
Dept: CT IMAGING | Facility: CLINIC | Age: 77
Discharge: HOME | End: 2022-05-06
Payer: MEDICARE

## 2022-05-06 DIAGNOSIS — J85.1 ABSCESS OF UPPER LOBE OF LEFT LUNG WITH PNEUMONIA (H): ICD-10-CM

## 2022-05-06 PROCEDURE — 71250 CT THORAX DX C-: CPT

## 2022-05-08 ENCOUNTER — LAB REQUISITION (OUTPATIENT)
Dept: LAB | Facility: CLINIC | Age: 77
End: 2022-05-08
Payer: MEDICARE

## 2022-05-08 DIAGNOSIS — J18.9 PNEUMONIA, UNSPECIFIED ORGANISM: ICD-10-CM

## 2022-05-09 ENCOUNTER — TRANSITIONAL CARE UNIT VISIT (OUTPATIENT)
Dept: GERIATRICS | Facility: CLINIC | Age: 77
End: 2022-05-09
Payer: MEDICARE

## 2022-05-09 ENCOUNTER — TELEPHONE (OUTPATIENT)
Dept: INFECTIOUS DISEASES | Facility: CLINIC | Age: 77
End: 2022-05-09

## 2022-05-09 VITALS
OXYGEN SATURATION: 96 % | DIASTOLIC BLOOD PRESSURE: 67 MMHG | WEIGHT: 123 LBS | SYSTOLIC BLOOD PRESSURE: 114 MMHG | HEART RATE: 70 BPM | RESPIRATION RATE: 20 BRPM | HEIGHT: 59 IN | TEMPERATURE: 98.9 F | BODY MASS INDEX: 24.8 KG/M2

## 2022-05-09 DIAGNOSIS — I50.33 ACUTE ON CHRONIC DIASTOLIC CONGESTIVE HEART FAILURE (H): ICD-10-CM

## 2022-05-09 DIAGNOSIS — J43.9 PULMONARY EMPHYSEMA, UNSPECIFIED EMPHYSEMA TYPE (H): Primary | ICD-10-CM

## 2022-05-09 DIAGNOSIS — J44.1 COPD EXACERBATION (H): ICD-10-CM

## 2022-05-09 LAB
ALBUMIN SERPL-MCNC: 2.7 G/DL (ref 3.5–5)
ALP SERPL-CCNC: 63 U/L (ref 45–120)
ALT SERPL W P-5'-P-CCNC: <9 U/L (ref 0–45)
ANION GAP SERPL CALCULATED.3IONS-SCNC: 12 MMOL/L (ref 5–18)
AST SERPL W P-5'-P-CCNC: 23 U/L (ref 0–40)
BASOPHILS # BLD AUTO: 0.1 10E3/UL (ref 0–0.2)
BASOPHILS NFR BLD AUTO: 1 %
BILIRUB SERPL-MCNC: 0.4 MG/DL (ref 0–1)
BUN SERPL-MCNC: 23 MG/DL (ref 8–28)
C REACTIVE PROTEIN LHE: 4.4 MG/DL (ref 0–0.8)
CALCIUM SERPL-MCNC: 9.2 MG/DL (ref 8.5–10.5)
CHLORIDE BLD-SCNC: 105 MMOL/L (ref 98–107)
CO2 SERPL-SCNC: 25 MMOL/L (ref 22–31)
CREAT SERPL-MCNC: 1.35 MG/DL (ref 0.6–1.1)
EOSINOPHIL # BLD AUTO: 0.3 10E3/UL (ref 0–0.7)
EOSINOPHIL NFR BLD AUTO: 3 %
ERYTHROCYTE [DISTWIDTH] IN BLOOD BY AUTOMATED COUNT: 15.1 % (ref 10–15)
GFR SERPL CREATININE-BSD FRML MDRD: 40 ML/MIN/1.73M2
GLUCOSE BLD-MCNC: 68 MG/DL (ref 70–125)
HCT VFR BLD AUTO: 36 % (ref 35–47)
HGB BLD-MCNC: 10.9 G/DL (ref 11.7–15.7)
IMM GRANULOCYTES # BLD: 0.1 10E3/UL
IMM GRANULOCYTES NFR BLD: 1 %
LYMPHOCYTES # BLD AUTO: 1.5 10E3/UL (ref 0.8–5.3)
LYMPHOCYTES NFR BLD AUTO: 14 %
MCH RBC QN AUTO: 28.5 PG (ref 26.5–33)
MCHC RBC AUTO-ENTMCNC: 30.3 G/DL (ref 31.5–36.5)
MCV RBC AUTO: 94 FL (ref 78–100)
MONOCYTES # BLD AUTO: 1.1 10E3/UL (ref 0–1.3)
MONOCYTES NFR BLD AUTO: 11 %
NEUTROPHILS # BLD AUTO: 7.1 10E3/UL (ref 1.6–8.3)
NEUTROPHILS NFR BLD AUTO: 70 %
NRBC # BLD AUTO: 0 10E3/UL
NRBC BLD AUTO-RTO: 0 /100
PLATELET # BLD AUTO: 289 10E3/UL (ref 150–450)
POTASSIUM BLD-SCNC: 4.3 MMOL/L (ref 3.5–5)
PROT SERPL-MCNC: 7.7 G/DL (ref 6–8)
RBC # BLD AUTO: 3.83 10E6/UL (ref 3.8–5.2)
SODIUM SERPL-SCNC: 142 MMOL/L (ref 136–145)
WBC # BLD AUTO: 10.1 10E3/UL (ref 4–11)

## 2022-05-09 PROCEDURE — 85025 COMPLETE CBC W/AUTO DIFF WBC: CPT

## 2022-05-09 PROCEDURE — 86140 C-REACTIVE PROTEIN: CPT

## 2022-05-09 PROCEDURE — P9604 ONE-WAY ALLOW PRORATED TRIP: HCPCS

## 2022-05-09 PROCEDURE — 36415 COLL VENOUS BLD VENIPUNCTURE: CPT

## 2022-05-09 PROCEDURE — 99309 SBSQ NF CARE MODERATE MDM 30: CPT | Performed by: NURSE PRACTITIONER

## 2022-05-09 PROCEDURE — 80053 COMPREHEN METABOLIC PANEL: CPT

## 2022-05-09 NOTE — TELEPHONE ENCOUNTER
Jossy @ [facility] left a voice mail requesting a call back at [phone]. They have questions about the IV antibiotics.

## 2022-05-09 NOTE — PROGRESS NOTES
[facility]ERIATRIC [facility]  Chief Complaint   Patient presents with     ELENA     [place] Medical Record Number:  [id number]  Place of Service where encounter took place:  [facility] ([facility]) [[id number]]  Code Status:  No CPR- Do NOT Intubate     HISTORY:      HPI:  Irene León  is 77 year old (1945) undergoing physical and occupational therapy.  She is with PMHx heart failure,LBBB, HTN, factor V Leiden, TIA, and atrial fibrillation on Xarelto, CKD stage 3, and 35+ lb unintentional weight loss in the past one year.Per hospital records she admitted on 4/7/2022 for community acquired pneumonia in the setting of severe COPD on baseline 2L O2 by NC.  Well. Hospitalization was initially complicated by resolved delirium, followed by frequent stooling and diagnosis of C difficile diarrhea. After completing a course of antibiotics for pneumonia, she experienced acute worsening of respiratory status and repeat CT chest revealed interim development of a 5cm left upper lobe cavitation. Infectious disease and pulmonology were consulted.  Antibiotics were broadened to zosyn then transitioned to meropenem.  Due to the patient's baseline poor pulmonary status, bronchoscopy was discussed but ultimately decided with her clinical improvement the risk would be great.  The patient declined bronchoscopy as well.      Clinically, following initiation of meropenem for her pulmonary abscess and fidaxomicin for her C. difficile infection, her WBC and CRP continue to improve. She was titrated to her home baseline 2 L oxygen continuously.   Per pulmonology recommendations, outpatient pulmonary rehab would be recommended as well as CT chest in 2 weeks.     Per infectious disease recommendations, she should continue on 3 weeks of IV meropenem, she should have repeat CT of chest in 2 weeks and follow-up appointment with infectious disease in 2 to 3 weeks to discuss imaging and if further antibiotics intravenously will be  necessary.  Plan for weekly CBC CMP CRP monitoring.  Per infectious disease, plan for vancomycin tapering course with titration over the next 2 months, and completion of fidaxomicin 10-day course.     Early in her hospitalization she had a brief episode of hospital related delirium.  Medications were adjusted and her Vesicare and Myrbetriq were ultimately held due to the increased risk of delirium associated.  I recommended not resuming these medications, given she is on a diuretic. The family would like to consider resuming at discharge from transitional care facility.    Due to her anxiety in the setting of acute illness, mirtazapine 15 mg nightly was started.  She is tolerating this well, and has seen improvement in sleep and baseline anxiety levels.    After discussions with palliative care and pulmonology, her CODE STATUS was made DNR/DNI.    Today she is seen to review vital signs, labs, follow-up COPD exacerbation and for a routine visit.  She denied chest pain.  She was short of breath when writer met with her because she had chest walk from the bathroom she did rebounded quickly.  She will follow-up with infectious disease Dr. Little on 5/11. she has been on oxygen for approximately 1 year per her report.  She denied constipation diarrhea.  Her bowel movements are now formed.  She is on oral vancomycin taper.  She continues on IV antibiotics meropenem until 5/13/2022 and being followed by infectious disease.  She will have a follow-up CT of her chest done in 2 weeks..  She was sent to emergency on 5/2/2022 for a right shin skin tear in which the facility could not get to stop bleeding.  The bleeding appears to have stopped by the time she reached the clinic.  The wound was irrigated and a pressure dressing was applied and per records she was up-to-date on her tetanus shot.  Labs reviewed today and will be sent to infectious disease.      ALLERGIES:Sulfa (sulfonamide antibiotics) [sulfa drugs], Homeopathic  drugs [external allergen needs reconciliation - see comment], Mold extracts [molds & smuts], Morphine (pf) [morphine], Lisinopril, and Sulfacetamide sodium [sulfacetamide]    PAST MEDICAL HISTORY:   Past Medical History:   Diagnosis Date     ANATOLIY (acute kidney injury) (H)      Allergic rhinitis      Arrhythmia      Atrial fibrillation with RVR (H)      Bacteremia      Breast cancer (H) 2017     Cardiomyopathy (H)      Centrilobular emphysema (H)      CHF (congestive heart failure) (H)      CKD (chronic kidney disease)      COPD (chronic obstructive pulmonary disease) (H)      Coronary artery disease      Depression      Dysphagia      E. coli sepsis (H)      Factor 5 Leiden mutation, heterozygous (H)      GERD (gastroesophageal reflux disease)      Hx of radiation therapy 2017     Hyperlipidemia      Hypertension      Hypokalemia      Hypomagnesemia      Idiopathic cardiomyopathy (H)      Left hip pain 4/30/2014     OAB (overactive bladder)      Osteoporosis      Sacral insufficiency fracture      Sinusitis      Syncope      Urge incontinence      Vocal cord dysfunction        PAST SURGICAL HISTORY:   has a past surgical history that includes IR Abdominal Aortogram (4/16/2003); IR Miscellaneous Procedure (4/16/2003); IR Aortic Arch 4 Vessel Angiogram (4/16/2003); Arthroplasty revision hip (Left); OPEN TX FEMORAL FRACTURE DISTAL MED/LAT CONDYLE (Left, 10/28/2015); Cardiac catheterization; Cataract Extraction (Left, 07/18/2017); Biopsy breast (Right, 2017); Bladder surgery; Lumpectomy breast (Left, 06/2017); PICC/Midline Placement (4/7/2022); and PICC/Midline Placement (4/11/2022).    FAMILY HISTORY: family history includes Breast Cancer in her maternal aunt; Osteoporosis in an other family member; Prostate Cancer in her brother and maternal uncle.    SOCIAL HISTORY:  reports that she quit smoking about 14 years ago. She quit smokeless tobacco use about 17 years ago. She reports that she does not drink alcohol and does  "not use drugs.    ROS:  Constitutional: Negative for activity change, appetite change, fatigue and fever.   HENT: Negative for congestion.    Respiratory: Negative for cough,  positive shortness of breath and wheezing. Continuous oxygen 3l NC   Cardiovascular: Negative for chest pain and leg swelling.   Gastrointestinal: Negative for abdominal distention, abdominal pain, constipation, diarrhea and nausea. Recent C- diff, BM's now formed per staff  Genitourinary: Negative for dysuria.   Musculoskeletal: Negative for arthralgia. Negative for back pain.   Skin: Negative for color change and wound.   Neurological: Negative for dizziness.   Psychiatric/Behavioral: Negative for agitation, behavioral problems and confusion.     Physical Exam:  Constitutional:       Appearance: Patient is well-developed.   HENT:      Head: Normocephalic.   Eyes:      Conjunctiva/sclera: Conjunctivae normal.   Neck:      Musculoskeletal: Normal range of motion.   Cardiovascular:      Rate and Rhythm: Normal rate and regular rhythm.      Heart sounds: Normal heart sounds. No murmur.   Pulmonary:      Effort: No respiratory distress.  Shortness of breath on 2 L oxygen nasal cannula     Breath sounds: Normal breath sounds. No wheezing or rales.   Abdominal:      General: Bowel sounds are normal. There is no distension.      Palpations: Abdomen is soft.      Tenderness: There is no abdominal tenderness.   Musculoskeletal:       Normal range of motion.     Skin:General:        Skin is warm.   Neurological:         Mental Status: Patient is alert and oriented to person, place, and time.   Psychiatric:         Behavior: Behavior normal.     Vitals:/67   Pulse 70   Temp 98.9  F (37.2  C)   Resp 20   Ht 1.499 m (4' 11\")   Wt 55.8 kg (123 lb)   SpO2 96%   BMI 24.84 kg/m   and Body mass index is 24.84 kg/m .    Lab/Diagnostic data:   Recent Results (from the past 240 hour(s))   CRP inflammation    Collection Time: 05/02/22  7:04 AM "   Result Value Ref Range    CRP 5.2 (H) 0.0 - 0.8 mg/dL   Comprehensive metabolic panel    Collection Time: 05/02/22  7:04 AM   Result Value Ref Range    Sodium 141 136 - 145 mmol/L    Potassium 5.0 3.5 - 5.0 mmol/L    Chloride 107 98 - 107 mmol/L    Carbon Dioxide (CO2) 26 22 - 31 mmol/L    Anion Gap 8 5 - 18 mmol/L    Urea Nitrogen 16 8 - 28 mg/dL    Creatinine 1.11 (H) 0.60 - 1.10 mg/dL    Calcium 8.8 8.5 - 10.5 mg/dL    Glucose 76 70 - 125 mg/dL    Alkaline Phosphatase 75 45 - 120 U/L    AST 18 0 - 40 U/L    ALT <9 0 - 45 U/L    Protein Total 6.9 6.0 - 8.0 g/dL    Albumin 2.4 (L) 3.5 - 5.0 g/dL    Bilirubin Total 0.4 0.0 - 1.0 mg/dL    GFR Estimate 51 (L) >60 mL/min/1.73m2   CBC with platelets and differential    Collection Time: 05/02/22  7:04 AM   Result Value Ref Range    WBC Count 10.1 4.0 - 11.0 10e3/uL    RBC Count 3.54 (L) 3.80 - 5.20 10e6/uL    Hemoglobin 10.2 (L) 11.7 - 15.7 g/dL    Hematocrit 33.2 (L) 35.0 - 47.0 %    MCV 94 78 - 100 fL    MCH 28.8 26.5 - 33.0 pg    MCHC 30.7 (L) 31.5 - 36.5 g/dL    RDW 15.3 (H) 10.0 - 15.0 %    Platelet Count 289 150 - 450 10e3/uL    % Neutrophils 68 %    % Lymphocytes 13 %    % Monocytes 12 %    % Eosinophils 6 %    % Basophils 1 %    % Immature Granulocytes 0 %    NRBCs per 100 WBC 0 <1 /100    Absolute Neutrophils 6.9 1.6 - 8.3 10e3/uL    Absolute Lymphocytes 1.3 0.8 - 5.3 10e3/uL    Absolute Monocytes 1.2 0.0 - 1.3 10e3/uL    Absolute Eosinophils 0.6 0.0 - 0.7 10e3/uL    Absolute Basophils 0.1 0.0 - 0.2 10e3/uL    Absolute Immature Granulocytes 0.0 <=0.4 10e3/uL    Absolute NRBCs 0.0 10e3/uL   Comprehensive metabolic panel    Collection Time: 05/09/22  6:57 AM   Result Value Ref Range    Sodium 142 136 - 145 mmol/L    Potassium 4.3 3.5 - 5.0 mmol/L    Chloride 105 98 - 107 mmol/L    Carbon Dioxide (CO2) 25 22 - 31 mmol/L    Anion Gap 12 5 - 18 mmol/L    Urea Nitrogen 23 8 - 28 mg/dL    Creatinine 1.35 (H) 0.60 - 1.10 mg/dL    Calcium 9.2 8.5 - 10.5 mg/dL     Glucose 68 (L) 70 - 125 mg/dL    Alkaline Phosphatase 63 45 - 120 U/L    AST 23 0 - 40 U/L    ALT <9 0 - 45 U/L    Protein Total 7.7 6.0 - 8.0 g/dL    Albumin 2.7 (L) 3.5 - 5.0 g/dL    Bilirubin Total 0.4 0.0 - 1.0 mg/dL    GFR Estimate 40 (L) >60 mL/min/1.73m2   CRP inflammation    Collection Time: 05/09/22  6:57 AM   Result Value Ref Range    CRP 4.4 (H) 0.0 - 0.8 mg/dL   CBC with platelets and differential    Collection Time: 05/09/22  6:57 AM   Result Value Ref Range    WBC Count 10.1 4.0 - 11.0 10e3/uL    RBC Count 3.83 3.80 - 5.20 10e6/uL    Hemoglobin 10.9 (L) 11.7 - 15.7 g/dL    Hematocrit 36.0 35.0 - 47.0 %    MCV 94 78 - 100 fL    MCH 28.5 26.5 - 33.0 pg    MCHC 30.3 (L) 31.5 - 36.5 g/dL    RDW 15.1 (H) 10.0 - 15.0 %    Platelet Count 289 150 - 450 10e3/uL    % Neutrophils 70 %    % Lymphocytes 14 %    % Monocytes 11 %    % Eosinophils 3 %    % Basophils 1 %    % Immature Granulocytes 1 %    NRBCs per 100 WBC 0 <1 /100    Absolute Neutrophils 7.1 1.6 - 8.3 10e3/uL    Absolute Lymphocytes 1.5 0.8 - 5.3 10e3/uL    Absolute Monocytes 1.1 0.0 - 1.3 10e3/uL    Absolute Eosinophils 0.3 0.0 - 0.7 10e3/uL    Absolute Basophils 0.1 0.0 - 0.2 10e3/uL    Absolute Immature Granulocytes 0.1 <=0.4 10e3/uL    Absolute NRBCs 0.0 10e3/uL   There is    MEDICATIONS:     Review of your medicines          Accurate as of May 9, 2022 10:58 AM. If you have any questions, ask your nurse or doctor.            CONTINUE these medicines which may have CHANGED, or have new prescriptions. If we are uncertain of the size of tablets/capsules you have at home, strength may be listed as something that might have changed.      Dose / Directions   furosemide 40 MG tablet  Commonly known as: LASIX  This may have changed: Another medication with the same name was removed. Continue taking this medication, and follow the directions you see here.  Changed by: Willa Sharpe CNP      Dose: 40 mg  Take 40 mg by mouth daily  Refills: 0     guaiFENesin  600 MG 12 hr tablet  Commonly known as: MUCINEX  This may have changed: Another medication with the same name was removed. Continue taking this medication, and follow the directions you see here.  Used for: COPD exacerbation (H), Pulmonary emphysema, unspecified emphysema type (H)  Changed by: Willa Sharpe CNP      Dose: 1,200 mg  Take 2 tablets (1,200 mg) by mouth 2 times daily  Quantity: 60 tablet  Refills: 0        CONTINUE these medicines which have NOT CHANGED      Dose / Directions   acetaminophen 500 MG tablet  Commonly known as: TYLENOL      Dose: 1,000 mg  Take 1,000 mg by mouth every 8 hours as needed for mild pain or fever  Refills: 0     albuterol (2.5 MG/3ML) 0.083% neb solution  Commonly known as: PROVENTIL  Used for: COPD exacerbation (H), Pulmonary emphysema, unspecified emphysema type (H)      Dose: 2.5 mg  Take 1 vial (2.5 mg) by nebulization every 2 hours as needed for shortness of breath / dyspnea  Quantity: 90 mL  Refills: 0     ascorbic acid 1000 MG Tabs tablet      Dose: 1,000 mg  [ASCORBIC ACID, VITAMIN C, (VITAMIN C) 1000 MG TABLET] Take 1,000 mg by mouth daily. Airborne 1 tab daily  Refills: 0     Atrovent HFA 17 MCG/ACT inhaler  Used for: Chronic obstructive pulmonary disease, unspecified COPD type (H)  Generic drug: ipratropium      [ATROVENT HFA 17 MCG/ACTUATION INHALER] USE 2 INHALATIONS EVERY 6 HOURS  Quantity: 77.4 g  Refills: 3     cetirizine 10 MG tablet  Commonly known as: zyrTEC      Dose: 10 mg  Take 10 mg by mouth daily as needed for allergies  Refills: 0     chlorhexidine 0.12 % solution  Commonly known as: PERIDEX      Dose: 15 mL  [CHLORHEXIDINE (PERIDEX) 0.12 % SOLUTION] Apply 15 mL to the mouth or throat at bedtime as needed.  Refills: 0     cholecalciferol 25 MCG (1000 UT) Tabs      Dose: 1,000 Units  [CHOLECALCIFEROL, VITAMIN D3, 1,000 UNIT TABLET] Take 1,000 Units by mouth daily.  Refills: 0     EPINEPHrine 0.3 MG/0.3ML injection 2-pack  Commonly known as: ANY BX  GENERIC EQUIV      Dose: 0.3 mg  Inject 0.3 mg into the muscle as needed for anaphylaxis  Refills: 0     famotidine 20 MG tablet  Commonly known as: PEPCID  Used for: COPD exacerbation (H), Pulmonary emphysema, unspecified emphysema type (H)      Dose: 20 mg  Take 1 tablet (20 mg) by mouth At Bedtime  Quantity: 30 tablet  Refills: 0     fluticasone 50 MCG/ACT nasal spray  Commonly known as: FLONASE  Used for: COPD exacerbation (H), Pulmonary emphysema, unspecified emphysema type (H)      Dose: 1 spray  Spray 1 spray into both nostrils daily  Quantity: 15.8 mL  Refills: 0     Fluticasone-Umeclidin-Vilanterol 200-62.5-25 MCG/INH oral inhaler  Commonly known as: TRELEGY ELLIPTA      Dose: 1 puff  Inhale 1 puff into the lungs At Bedtime  Refills: 0     * gabapentin 300 MG capsule  Commonly known as: NEURONTIN  Used for: COPD exacerbation (H), Pulmonary emphysema, unspecified emphysema type (H)      Dose: 300 mg  Take 1 capsule (300 mg) by mouth At Bedtime  Quantity: 30 capsule  Refills: 0     * gabapentin 100 MG capsule  Commonly known as: NEURONTIN  Used for: COPD exacerbation (H), Pulmonary emphysema, unspecified emphysema type (H)      Dose: 200 mg  Take 2 capsules (200 mg) by mouth every morning  Quantity: 60 capsule  Refills: 0     ipratropium - albuterol 0.5 mg/2.5 mg/3 mL 0.5-2.5 (3) MG/3ML neb solution  Commonly known as: DUONEB  Used for: COPD exacerbation (H), Pulmonary emphysema, unspecified emphysema type (H)      Dose: 3 mL  Take 1 vial (3 mLs) by nebulization 4 times daily  Quantity: 90 mL  Refills: 0     lactobacillus rhamnosus (GG) capsule  Used for: COPD exacerbation (H), Pulmonary emphysema, unspecified emphysema type (H)      Dose: 1 capsule  Take 1 capsule by mouth 2 times daily  Quantity: 60 capsule  Refills: 0     melatonin 5 MG tablet  Used for: COPD exacerbation (H), Pulmonary emphysema, unspecified emphysema type (H)      Dose: 5 mg  Take 1 tablet (5 mg) by mouth At Bedtime  Quantity: 30  tablet  Refills: 0     menthol-zinc oxide 0.44-20.6 % Oint ointment  Commonly known as: CALMOSEPTINE  Used for: COPD exacerbation (H), Pulmonary emphysema, unspecified emphysema type (H)      Apply topically 4 times daily as needed for skin protection  Quantity: 113 g  Refills: 0     meropenem 1 g vial  Commonly known as: MERREM  Indication: Abscess  Used for: Abscess of upper lobe of left lung with pneumonia (H)      Dose: 1 g  Inject 1,000 mg (1 g) into the vein every 12 hours for 21 days  Quantity: 42 each  Refills: 0     metoprolol succinate ER 25 MG 24 hr tablet  Commonly known as: TOPROL XL  Used for: COPD exacerbation (H), Pulmonary emphysema, unspecified emphysema type (H), Acute on chronic diastolic congestive heart failure (H)      Dose: 75 mg  Take 3 tablets (75 mg) by mouth At Bedtime  Quantity: 90 tablet  Refills: 0     mirtazapine 15 MG tablet  Commonly known as: REMERON  Used for: COPD exacerbation (H), Pulmonary emphysema, unspecified emphysema type (H)      Dose: 15 mg  Take 1 tablet (15 mg) by mouth At Bedtime  Quantity: 30 tablet  Refills: 0     potassium chloride ER 10 MEQ CR tablet  Commonly known as: K-TAB/KLOR-CON  Used for: Hypokalemia      Dose: 10 mEq  Take 1 tablet (10 mEq) by mouth 2 times daily  Quantity: 180 tablet  Refills: 3     rivaroxaban ANTICOAGULANT 15 MG Tabs tablet  Commonly known as: XARELTO  Used for: History of stroke      Dose: 15 mg  Take 1 tablet (15 mg) by mouth At Bedtime  Quantity: 90 tablet  Refills: 3     simvastatin 40 MG tablet  Commonly known as: ZOCOR  Used for: Hypercholesterolemia      Dose: 40 mg  Take 1 tablet (40 mg) by mouth At Bedtime  Quantity: 90 tablet  Refills: 3     sodium chloride 0.9 % infusion      Inject into the vein continuous Use 10 ml intravenously two times a day for  flushing before and after each IV medication  administration. after IVAB  Refills: 0     tamoxifen 20 MG tablet  Commonly known as: NOLVADEX  Used for: Malignant neoplasm of  central portion of left breast in female, estrogen receptor positive (H)      TAKE 1 TABLET DAILY  Quantity: 90 tablet  Refills: 3     traMADol 50 MG tablet  Commonly known as: ULTRAM  Used for: COPD exacerbation (H), Pulmonary emphysema, unspecified emphysema type (H)      Dose: 50 mg  Take 1 tablet (50 mg) by mouth 2 times daily  Quantity: 60 tablet  Refills: 0     * vancomycin 125 MG capsule  Commonly known as: VANCOCIN  Indication: Clostridium difficile Bacteria  Used for: C. difficile colitis      Dose: 125 mg  Take 1 capsule (125 mg) by mouth 4 times daily  Quantity: 56 capsule  Refills: 0     * vancomycin 125 MG capsule  Commonly known as: VANCOCIN  Indication: Clostridium difficile Bacteria  Used for: C. difficile colitis      Dose: 125 mg  Take 1 capsule (125 mg) by mouth 2 times daily  Quantity: 14 capsule  Refills: 0     * vancomycin 125 MG capsule  Commonly known as: VANCOCIN  Indication: Clostridium difficile Bacteria  Used for: C. difficile colitis      Dose: 125 mg  Start taking on: May 13, 2022  Take 1 capsule (125 mg) by mouth daily  Quantity: 7 capsule  Refills: 0     * vancomycin 125 MG capsule  Commonly known as: VANCOCIN  Indication: Clostridium difficile Bacteria  Used for: C. difficile colitis      Dose: 125 mg  Start taking on: May 20, 2022  Take 1 capsule (125 mg) by mouth every 48 hours  Quantity: 14 capsule  Refills: 0         * This list has 6 medication(s) that are the same as other medications prescribed for you. Read the directions carefully, and ask your doctor or other care provider to review them with you.                ASSESSMENT/PLAN  Encounter Diagnoses   Name Primary?     Pulmonary emphysema, unspecified emphysema type (H) Yes     Acute on chronic diastolic congestive heart failure (H)      COPD exacerbation (H)      Pulmonary emphysema-completed prednisone ,Nebulizers and Atrovent  Inhaler QID, Ellipta    Physical deconditioning PT OT    Congestive heart failure - daily  weights, Lasix    Hypertension Lasix  40 mg daily, Continue 75 mg metoprolol succinate daily     C. difficile currently on a vancomycin taper    Pain management - PRN Tramadol, PRN Tylenol, and scheduled Gabapentin     Left breast Cancer- On Tamoxifen, Follow up with Oncology    HDL  Zocor 40 mg HS    Anticoagulation management on Xarelto    Pneumonia On IV Merrem until 5/13/2022, Followed by Infectious Disease, Follow up chest CT in 2 weeks       Electronically signed by: Willa Sharpe CNP

## 2022-05-09 NOTE — TELEPHONE ENCOUNTER
Jossy called back wondering when the end date of abx was, I informed 5/13, however this will be determined at the 5/11 visit. They will need a discontinue PICC order if the end date is Friday.

## 2022-05-09 NOTE — LETTER
5/9/2022        RE: Irene León  [address] [facility] [address]         [facility] GERIATRIC SERVICES  Chief Complaint   Patient presents with     [facility] Medical Record Number:  [id number]  Place of Service where encounter took place:  [facility] ([facility]) [[id number]]  Code Status:  No CPR- Do NOT Intubate     HISTORY:      HPI:  Irene León  is 77 year old (1945) undergoing physical and occupational therapy.  She is with PMHx heart failure,LBBB, HTN, factor V Leiden, TIA, and atrial fibrillation on Xarelto, CKD stage 3, and 35+ lb unintentional weight loss in the past one year.Per hospital records she admitted on 4/7/2022 for community acquired pneumonia in the setting of severe COPD on baseline 2L O2 by NC.  Well. Hospitalization was initially complicated by resolved delirium, followed by frequent stooling and diagnosis of C difficile diarrhea. After completing a course of antibiotics for pneumonia, she experienced acute worsening of respiratory status and repeat CT chest revealed interim development of a 5cm left upper lobe cavitation. Infectious disease and pulmonology were consulted.  Antibiotics were broadened to zosyn then transitioned to meropenem.  Due to the patient's baseline poor pulmonary status, bronchoscopy was discussed but ultimately decided with her clinical improvement the risk would be great.  The patient declined bronchoscopy as well.      Clinically, following initiation of meropenem for her pulmonary abscess and fidaxomicin for her C. difficile infection, her WBC and CRP continue to improve. She was titrated to her home baseline 2 L oxygen continuously.   Per pulmonology recommendations, outpatient pulmonary rehab would be recommended as well as CT chest in 2 weeks.     Per infectious disease recommendations, she should continue on 3 weeks of IV meropenem, she should have repeat CT of chest in 2 weeks and follow-up appointment with infectious disease in 2 to  3 weeks to discuss imaging and if further antibiotics intravenously will be necessary.  Plan for weekly CBC CMP CRP monitoring.  Per infectious disease, plan for vancomycin tapering course with titration over the next 2 months, and completion of fidaxomicin 10-day course.     Early in her hospitalization she had a brief episode of hospital related delirium.  Medications were adjusted and her Vesicare and Myrbetriq were ultimately held due to the increased risk of delirium associated.  I recommended not resuming these medications, given she is on a diuretic. The family would like to consider resuming at discharge from transitional care facility.    Due to her anxiety in the setting of acute illness, mirtazapine 15 mg nightly was started.  She is tolerating this well, and has seen improvement in sleep and baseline anxiety levels.    After discussions with palliative care and pulmonology, her CODE STATUS was made DNR/DNI.    Today she is seen to review vital signs, labs, follow-up COPD exacerbation and for a routine visit.  She denied chest pain.  She was short of breath when writer met with her because she had chest walk from the bathroom she did rebounded quickly.  She will follow-up with infectious disease Dr. Little on 5/11. she has been on oxygen for approximately 1 year per her report.  She denied constipation diarrhea.  Her bowel movements are now formed.  She is on oral vancomycin taper.  She continues on IV antibiotics meropenem until 5/13/2022 and being followed by infectious disease.  She will have a follow-up CT of her chest done in 2 weeks..  She was sent to emergency on 5/2/2022 for a right shin skin tear in which the facility could not get to stop bleeding.  The bleeding appears to have stopped by the time she reached the clinic.  The wound was irrigated and a pressure dressing was applied and per records she was up-to-date on her tetanus shot.  Labs reviewed today and will be sent to infectious  disease.      ALLERGIES:Sulfa (sulfonamide antibiotics) [sulfa drugs], Homeopathic drugs [external allergen needs reconciliation - see comment], Mold extracts [molds & smuts], Morphine (pf) [morphine], Lisinopril, and Sulfacetamide sodium [sulfacetamide]    PAST MEDICAL HISTORY:   Past Medical History:   Diagnosis Date     ANATOLIY (acute kidney injury) (H)      Allergic rhinitis      Arrhythmia      Atrial fibrillation with RVR (H)      Bacteremia      Breast cancer (H) 2017     Cardiomyopathy (H)      Centrilobular emphysema (H)      CHF (congestive heart failure) (H)      CKD (chronic kidney disease)      COPD (chronic obstructive pulmonary disease) (H)      Coronary artery disease      Depression      Dysphagia      E. coli sepsis (H)      Factor 5 Leiden mutation, heterozygous (H)      GERD (gastroesophageal reflux disease)      Hx of radiation therapy 2017     Hyperlipidemia      Hypertension      Hypokalemia      Hypomagnesemia      Idiopathic cardiomyopathy (H)      Left hip pain 4/30/2014     OAB (overactive bladder)      Osteoporosis      Sacral insufficiency fracture      Sinusitis      Syncope      Urge incontinence      Vocal cord dysfunction        PAST SURGICAL HISTORY:   has a past surgical history that includes IR Abdominal Aortogram (4/16/2003); IR Miscellaneous Procedure (4/16/2003); IR Aortic Arch 4 Vessel Angiogram (4/16/2003); Arthroplasty revision hip (Left); OPEN TX FEMORAL FRACTURE DISTAL MED/LAT CONDYLE (Left, 10/28/2015); Cardiac catheterization; Cataract Extraction (Left, 07/18/2017); Biopsy breast (Right, 2017); Bladder surgery; Lumpectomy breast (Left, 06/2017); PICC/Midline Placement (4/7/2022); and PICC/Midline Placement (4/11/2022).    FAMILY HISTORY: family history includes Breast Cancer in her maternal aunt; Osteoporosis in an other family member; Prostate Cancer in her brother and maternal uncle.    SOCIAL HISTORY:  reports that she quit smoking about 14 years ago. She quit smokeless  "tobacco use about 17 years ago. She reports that she does not drink alcohol and does not use drugs.    ROS:  Constitutional: Negative for activity change, appetite change, fatigue and fever.   HENT: Negative for congestion.    Respiratory: Negative for cough,  positive shortness of breath and wheezing. Continuous oxygen 3l NC   Cardiovascular: Negative for chest pain and leg swelling.   Gastrointestinal: Negative for abdominal distention, abdominal pain, constipation, diarrhea and nausea. Recent C- diff, BM's now formed per staff  Genitourinary: Negative for dysuria.   Musculoskeletal: Negative for arthralgia. Negative for back pain.   Skin: Negative for color change and wound.   Neurological: Negative for dizziness.   Psychiatric/Behavioral: Negative for agitation, behavioral problems and confusion.     Physical Exam:  Constitutional:       Appearance: Patient is well-developed.   HENT:      Head: Normocephalic.   Eyes:      Conjunctiva/sclera: Conjunctivae normal.   Neck:      Musculoskeletal: Normal range of motion.   Cardiovascular:      Rate and Rhythm: Normal rate and regular rhythm.      Heart sounds: Normal heart sounds. No murmur.   Pulmonary:      Effort: No respiratory distress.  Shortness of breath on 2 L oxygen nasal cannula     Breath sounds: Normal breath sounds. No wheezing or rales.   Abdominal:      General: Bowel sounds are normal. There is no distension.      Palpations: Abdomen is soft.      Tenderness: There is no abdominal tenderness.   Musculoskeletal:       Normal range of motion.     Skin:General:        Skin is warm.   Neurological:         Mental Status: Patient is alert and oriented to person, place, and time.   Psychiatric:         Behavior: Behavior normal.     Vitals:/67   Pulse 70   Temp 98.9  F (37.2  C)   Resp 20   Ht 1.499 m (4' 11\")   Wt 55.8 kg (123 lb)   SpO2 96%   BMI 24.84 kg/m   and Body mass index is 24.84 kg/m .    Lab/Diagnostic data:   Recent Results (from " the past 240 hour(s))   CRP inflammation    Collection Time: 05/02/22  7:04 AM   Result Value Ref Range    CRP 5.2 (H) 0.0 - 0.8 mg/dL   Comprehensive metabolic panel    Collection Time: 05/02/22  7:04 AM   Result Value Ref Range    Sodium 141 136 - 145 mmol/L    Potassium 5.0 3.5 - 5.0 mmol/L    Chloride 107 98 - 107 mmol/L    Carbon Dioxide (CO2) 26 22 - 31 mmol/L    Anion Gap 8 5 - 18 mmol/L    Urea Nitrogen 16 8 - 28 mg/dL    Creatinine 1.11 (H) 0.60 - 1.10 mg/dL    Calcium 8.8 8.5 - 10.5 mg/dL    Glucose 76 70 - 125 mg/dL    Alkaline Phosphatase 75 45 - 120 U/L    AST 18 0 - 40 U/L    ALT <9 0 - 45 U/L    Protein Total 6.9 6.0 - 8.0 g/dL    Albumin 2.4 (L) 3.5 - 5.0 g/dL    Bilirubin Total 0.4 0.0 - 1.0 mg/dL    GFR Estimate 51 (L) >60 mL/min/1.73m2   CBC with platelets and differential    Collection Time: 05/02/22  7:04 AM   Result Value Ref Range    WBC Count 10.1 4.0 - 11.0 10e3/uL    RBC Count 3.54 (L) 3.80 - 5.20 10e6/uL    Hemoglobin 10.2 (L) 11.7 - 15.7 g/dL    Hematocrit 33.2 (L) 35.0 - 47.0 %    MCV 94 78 - 100 fL    MCH 28.8 26.5 - 33.0 pg    MCHC 30.7 (L) 31.5 - 36.5 g/dL    RDW 15.3 (H) 10.0 - 15.0 %    Platelet Count 289 150 - 450 10e3/uL    % Neutrophils 68 %    % Lymphocytes 13 %    % Monocytes 12 %    % Eosinophils 6 %    % Basophils 1 %    % Immature Granulocytes 0 %    NRBCs per 100 WBC 0 <1 /100    Absolute Neutrophils 6.9 1.6 - 8.3 10e3/uL    Absolute Lymphocytes 1.3 0.8 - 5.3 10e3/uL    Absolute Monocytes 1.2 0.0 - 1.3 10e3/uL    Absolute Eosinophils 0.6 0.0 - 0.7 10e3/uL    Absolute Basophils 0.1 0.0 - 0.2 10e3/uL    Absolute Immature Granulocytes 0.0 <=0.4 10e3/uL    Absolute NRBCs 0.0 10e3/uL   Comprehensive metabolic panel    Collection Time: 05/09/22  6:57 AM   Result Value Ref Range    Sodium 142 136 - 145 mmol/L    Potassium 4.3 3.5 - 5.0 mmol/L    Chloride 105 98 - 107 mmol/L    Carbon Dioxide (CO2) 25 22 - 31 mmol/L    Anion Gap 12 5 - 18 mmol/L    Urea Nitrogen 23 8 - 28 mg/dL     Creatinine 1.35 (H) 0.60 - 1.10 mg/dL    Calcium 9.2 8.5 - 10.5 mg/dL    Glucose 68 (L) 70 - 125 mg/dL    Alkaline Phosphatase 63 45 - 120 U/L    AST 23 0 - 40 U/L    ALT <9 0 - 45 U/L    Protein Total 7.7 6.0 - 8.0 g/dL    Albumin 2.7 (L) 3.5 - 5.0 g/dL    Bilirubin Total 0.4 0.0 - 1.0 mg/dL    GFR Estimate 40 (L) >60 mL/min/1.73m2   CRP inflammation    Collection Time: 05/09/22  6:57 AM   Result Value Ref Range    CRP 4.4 (H) 0.0 - 0.8 mg/dL   CBC with platelets and differential    Collection Time: 05/09/22  6:57 AM   Result Value Ref Range    WBC Count 10.1 4.0 - 11.0 10e3/uL    RBC Count 3.83 3.80 - 5.20 10e6/uL    Hemoglobin 10.9 (L) 11.7 - 15.7 g/dL    Hematocrit 36.0 35.0 - 47.0 %    MCV 94 78 - 100 fL    MCH 28.5 26.5 - 33.0 pg    MCHC 30.3 (L) 31.5 - 36.5 g/dL    RDW 15.1 (H) 10.0 - 15.0 %    Platelet Count 289 150 - 450 10e3/uL    % Neutrophils 70 %    % Lymphocytes 14 %    % Monocytes 11 %    % Eosinophils 3 %    % Basophils 1 %    % Immature Granulocytes 1 %    NRBCs per 100 WBC 0 <1 /100    Absolute Neutrophils 7.1 1.6 - 8.3 10e3/uL    Absolute Lymphocytes 1.5 0.8 - 5.3 10e3/uL    Absolute Monocytes 1.1 0.0 - 1.3 10e3/uL    Absolute Eosinophils 0.3 0.0 - 0.7 10e3/uL    Absolute Basophils 0.1 0.0 - 0.2 10e3/uL    Absolute Immature Granulocytes 0.1 <=0.4 10e3/uL    Absolute NRBCs 0.0 10e3/uL   There is    MEDICATIONS:     Review of your medicines          Accurate as of May 9, 2022 10:58 AM. If you have any questions, ask your nurse or doctor.            CONTINUE these medicines which may have CHANGED, or have new prescriptions. If we are uncertain of the size of tablets/capsules you have at home, strength may be listed as something that might have changed.      Dose / Directions   furosemide 40 MG tablet  Commonly known as: LASIX  This may have changed: Another medication with the same name was removed. Continue taking this medication, and follow the directions you see here.  Changed by: Willa Sharpe,  CNP      Dose: 40 mg  Take 40 mg by mouth daily  Refills: 0     guaiFENesin 600 MG 12 hr tablet  Commonly known as: MUCINEX  This may have changed: Another medication with the same name was removed. Continue taking this medication, and follow the directions you see here.  Used for: COPD exacerbation (H), Pulmonary emphysema, unspecified emphysema type (H)  Changed by: Willa Sharpe CNP      Dose: 1,200 mg  Take 2 tablets (1,200 mg) by mouth 2 times daily  Quantity: 60 tablet  Refills: 0        CONTINUE these medicines which have NOT CHANGED      Dose / Directions   acetaminophen 500 MG tablet  Commonly known as: TYLENOL      Dose: 1,000 mg  Take 1,000 mg by mouth every 8 hours as needed for mild pain or fever  Refills: 0     albuterol (2.5 MG/3ML) 0.083% neb solution  Commonly known as: PROVENTIL  Used for: COPD exacerbation (H), Pulmonary emphysema, unspecified emphysema type (H)      Dose: 2.5 mg  Take 1 vial (2.5 mg) by nebulization every 2 hours as needed for shortness of breath / dyspnea  Quantity: 90 mL  Refills: 0     ascorbic acid 1000 MG Tabs tablet      Dose: 1,000 mg  [ASCORBIC ACID, VITAMIN C, (VITAMIN C) 1000 MG TABLET] Take 1,000 mg by mouth daily. Airborne 1 tab daily  Refills: 0     Atrovent HFA 17 MCG/ACT inhaler  Used for: Chronic obstructive pulmonary disease, unspecified COPD type (H)  Generic drug: ipratropium      [ATROVENT HFA 17 MCG/ACTUATION INHALER] USE 2 INHALATIONS EVERY 6 HOURS  Quantity: 77.4 g  Refills: 3     cetirizine 10 MG tablet  Commonly known as: zyrTEC      Dose: 10 mg  Take 10 mg by mouth daily as needed for allergies  Refills: 0     chlorhexidine 0.12 % solution  Commonly known as: PERIDEX      Dose: 15 mL  [CHLORHEXIDINE (PERIDEX) 0.12 % SOLUTION] Apply 15 mL to the mouth or throat at bedtime as needed.  Refills: 0     cholecalciferol 25 MCG (1000 UT) Tabs      Dose: 1,000 Units  [CHOLECALCIFEROL, VITAMIN D3, 1,000 UNIT TABLET] Take 1,000 Units by mouth daily.  Refills: 0      EPINEPHrine 0.3 MG/0.3ML injection 2-pack  Commonly known as: ANY BX GENERIC EQUIV      Dose: 0.3 mg  Inject 0.3 mg into the muscle as needed for anaphylaxis  Refills: 0     famotidine 20 MG tablet  Commonly known as: PEPCID  Used for: COPD exacerbation (H), Pulmonary emphysema, unspecified emphysema type (H)      Dose: 20 mg  Take 1 tablet (20 mg) by mouth At Bedtime  Quantity: 30 tablet  Refills: 0     fluticasone 50 MCG/ACT nasal spray  Commonly known as: FLONASE  Used for: COPD exacerbation (H), Pulmonary emphysema, unspecified emphysema type (H)      Dose: 1 spray  Spray 1 spray into both nostrils daily  Quantity: 15.8 mL  Refills: 0     Fluticasone-Umeclidin-Vilanterol 200-62.5-25 MCG/INH oral inhaler  Commonly known as: TRELEGY ELLIPTA      Dose: 1 puff  Inhale 1 puff into the lungs At Bedtime  Refills: 0     * gabapentin 300 MG capsule  Commonly known as: NEURONTIN  Used for: COPD exacerbation (H), Pulmonary emphysema, unspecified emphysema type (H)      Dose: 300 mg  Take 1 capsule (300 mg) by mouth At Bedtime  Quantity: 30 capsule  Refills: 0     * gabapentin 100 MG capsule  Commonly known as: NEURONTIN  Used for: COPD exacerbation (H), Pulmonary emphysema, unspecified emphysema type (H)      Dose: 200 mg  Take 2 capsules (200 mg) by mouth every morning  Quantity: 60 capsule  Refills: 0     ipratropium - albuterol 0.5 mg/2.5 mg/3 mL 0.5-2.5 (3) MG/3ML neb solution  Commonly known as: DUONEB  Used for: COPD exacerbation (H), Pulmonary emphysema, unspecified emphysema type (H)      Dose: 3 mL  Take 1 vial (3 mLs) by nebulization 4 times daily  Quantity: 90 mL  Refills: 0     lactobacillus rhamnosus (GG) capsule  Used for: COPD exacerbation (H), Pulmonary emphysema, unspecified emphysema type (H)      Dose: 1 capsule  Take 1 capsule by mouth 2 times daily  Quantity: 60 capsule  Refills: 0     melatonin 5 MG tablet  Used for: COPD exacerbation (H), Pulmonary emphysema, unspecified emphysema type (H)       Dose: 5 mg  Take 1 tablet (5 mg) by mouth At Bedtime  Quantity: 30 tablet  Refills: 0     menthol-zinc oxide 0.44-20.6 % Oint ointment  Commonly known as: CALMOSEPTINE  Used for: COPD exacerbation (H), Pulmonary emphysema, unspecified emphysema type (H)      Apply topically 4 times daily as needed for skin protection  Quantity: 113 g  Refills: 0     meropenem 1 g vial  Commonly known as: MERREM  Indication: Abscess  Used for: Abscess of upper lobe of left lung with pneumonia (H)      Dose: 1 g  Inject 1,000 mg (1 g) into the vein every 12 hours for 21 days  Quantity: 42 each  Refills: 0     metoprolol succinate ER 25 MG 24 hr tablet  Commonly known as: TOPROL XL  Used for: COPD exacerbation (H), Pulmonary emphysema, unspecified emphysema type (H), Acute on chronic diastolic congestive heart failure (H)      Dose: 75 mg  Take 3 tablets (75 mg) by mouth At Bedtime  Quantity: 90 tablet  Refills: 0     mirtazapine 15 MG tablet  Commonly known as: REMERON  Used for: COPD exacerbation (H), Pulmonary emphysema, unspecified emphysema type (H)      Dose: 15 mg  Take 1 tablet (15 mg) by mouth At Bedtime  Quantity: 30 tablet  Refills: 0     potassium chloride ER 10 MEQ CR tablet  Commonly known as: K-TAB/KLOR-CON  Used for: Hypokalemia      Dose: 10 mEq  Take 1 tablet (10 mEq) by mouth 2 times daily  Quantity: 180 tablet  Refills: 3     rivaroxaban ANTICOAGULANT 15 MG Tabs tablet  Commonly known as: XARELTO  Used for: History of stroke      Dose: 15 mg  Take 1 tablet (15 mg) by mouth At Bedtime  Quantity: 90 tablet  Refills: 3     simvastatin 40 MG tablet  Commonly known as: ZOCOR  Used for: Hypercholesterolemia      Dose: 40 mg  Take 1 tablet (40 mg) by mouth At Bedtime  Quantity: 90 tablet  Refills: 3     sodium chloride 0.9 % infusion      Inject into the vein continuous Use 10 ml intravenously two times a day for  flushing before and after each IV medication  administration. after IVAB  Refills: 0     tamoxifen 20 MG  tablet  Commonly known as: NOLVADEX  Used for: Malignant neoplasm of central portion of left breast in female, estrogen receptor positive (H)      TAKE 1 TABLET DAILY  Quantity: 90 tablet  Refills: 3     traMADol 50 MG tablet  Commonly known as: ULTRAM  Used for: COPD exacerbation (H), Pulmonary emphysema, unspecified emphysema type (H)      Dose: 50 mg  Take 1 tablet (50 mg) by mouth 2 times daily  Quantity: 60 tablet  Refills: 0     * vancomycin 125 MG capsule  Commonly known as: VANCOCIN  Indication: Clostridium difficile Bacteria  Used for: C. difficile colitis      Dose: 125 mg  Take 1 capsule (125 mg) by mouth 4 times daily  Quantity: 56 capsule  Refills: 0     * vancomycin 125 MG capsule  Commonly known as: VANCOCIN  Indication: Clostridium difficile Bacteria  Used for: C. difficile colitis      Dose: 125 mg  Take 1 capsule (125 mg) by mouth 2 times daily  Quantity: 14 capsule  Refills: 0     * vancomycin 125 MG capsule  Commonly known as: VANCOCIN  Indication: Clostridium difficile Bacteria  Used for: C. difficile colitis      Dose: 125 mg  Start taking on: May 13, 2022  Take 1 capsule (125 mg) by mouth daily  Quantity: 7 capsule  Refills: 0     * vancomycin 125 MG capsule  Commonly known as: VANCOCIN  Indication: Clostridium difficile Bacteria  Used for: C. difficile colitis      Dose: 125 mg  Start taking on: May 20, 2022  Take 1 capsule (125 mg) by mouth every 48 hours  Quantity: 14 capsule  Refills: 0         * This list has 6 medication(s) that are the same as other medications prescribed for you. Read the directions carefully, and ask your doctor or other care provider to review them with you.                ASSESSMENT/PLAN  Encounter Diagnoses   Name Primary?     Pulmonary emphysema, unspecified emphysema type (H) Yes     Acute on chronic diastolic congestive heart failure (H)      COPD exacerbation (H)      Pulmonary emphysema-completed prednisone ,Nebulizers and Atrovent  Inhaler QID,  Ellipta    Physical deconditioning PT OT    Congestive heart failure - daily weights, Lasix    Hypertension Lasix  40 mg daily, Continue 75 mg metoprolol succinate daily     C. difficile currently on a vancomycin taper    Pain management - PRN Tramadol, PRN Tylenol, and scheduled Gabapentin     Left breast Cancer- On Tamoxifen, Follow up with Oncology    HDL  Zocor 40 mg HS    Anticoagulation management on Xarelto    Pneumonia On IV Merrem until 5/13/2022, Followed by Infectious Disease, Follow up chest CT in 2 weeks       Electronically signed by: Willa Sharpe CNP        Sincerely,        Willa Sharpe CNP

## 2022-05-10 ENCOUNTER — DISCHARGE SUMMARY NURSING HOME (OUTPATIENT)
Dept: GERIATRICS | Facility: CLINIC | Age: 77
End: 2022-05-10
Payer: MEDICARE

## 2022-05-10 VITALS
SYSTOLIC BLOOD PRESSURE: 108 MMHG | RESPIRATION RATE: 18 BRPM | BODY MASS INDEX: 24.8 KG/M2 | DIASTOLIC BLOOD PRESSURE: 42 MMHG | WEIGHT: 123 LBS | OXYGEN SATURATION: 94 % | HEART RATE: 80 BPM | TEMPERATURE: 98.1 F | HEIGHT: 59 IN

## 2022-05-10 DIAGNOSIS — J43.9 PULMONARY EMPHYSEMA, UNSPECIFIED EMPHYSEMA TYPE (H): Primary | ICD-10-CM

## 2022-05-10 DIAGNOSIS — R53.81 PHYSICAL DECONDITIONING: ICD-10-CM

## 2022-05-10 DIAGNOSIS — J85.1 ABSCESS OF UPPER LOBE OF LEFT LUNG WITH PNEUMONIA (H): ICD-10-CM

## 2022-05-10 DIAGNOSIS — R63.4 WEIGHT LOSS: ICD-10-CM

## 2022-05-10 DIAGNOSIS — I50.33 ACUTE ON CHRONIC DIASTOLIC CONGESTIVE HEART FAILURE (H): ICD-10-CM

## 2022-05-10 DIAGNOSIS — R09.02 HYPOXIA: ICD-10-CM

## 2022-05-10 DIAGNOSIS — I10 ESSENTIAL HYPERTENSION: ICD-10-CM

## 2022-05-10 PROCEDURE — 99316 NF DSCHRG MGMT 30 MIN+: CPT | Performed by: FAMILY MEDICINE

## 2022-05-10 NOTE — PROGRESS NOTES
[facility] GERIATRIC SERVICES       Patient Irene León  MRN: [id number]        Reason for Visit     Chief Complaint   Patient presents with     Discharge Summary Nursing Home   Follow-up SOB/ LYMPHEDEMA    Code Status     DNR only    Assessment     Pneumonia  ZONIA Abscess/ cavitory pneumonia  COPD  Hypoxic respiratory failure on 2 L of oxygen  Acute metabolic encephalopathy/delirium  C. difficile colitis  LYMPHEDEMA  Severe anxiety  Wt loss > 40lb  Generalized weakness    Plan     Pt is admitted to TCU for strengthening and rehab.  Seen today for follow-up for multiple concerns.  Leg wounds are improving.  Weights reviewed with the patient and she is down at 112 pounds and reports her lymphedema is controlled also  Blood pressures were reviewed with her and she is noted to be persistently running low blood pressures in the TCU   She is on a higher dose of Lasix 40 mg daily at her own request.  She also takes high doses of metoprolol.  She reports dizziness and symptomatic lightheadedness and is a falls risk.  After discussion with her we will reduce her Lasix back to 20 mg.  Education provided that if she gains weight or has worsening lymphedema she can always take an extra dose of Lasix  If blood pressures consistently remain low then she will need to discuss it with her primary.  She continues with her vancomycin with the last dose of 5/19/2022.  No diarrhea.  She was reporting rectal bleeding earlier but has no concerns about that today and has minimal concerns.  She will be discharging home on oxygen but overall compliance is limited.  Is also reporting low back pain which was reviewed she chronically has back pain she will make an appointment with orthopedics to discuss this.  She continues with her tramadol and encouraged to use that  She has had a few compression fractures in the past and if this worsens she needs imaging  In addition CT of the lungs are reviewed and has certainly improved  She has a  follow-up scheduled with pulmonary to discuss this  Cognitively she is done better without certain medications which were held in the hospital so we will not resume her Myrbetriq  And vesicare      History     Patient is a very pleasant 77 year old female who is admitted to TCU  Patient was admitted in the hospital with underlying history of COPD with pneumonia.  Her hospitalization was complicated by development of worsening respiratory status.  Repeat CT shows development of left upper lobe cavitation consideration was given for an abscess and pulmonary and infectious disease were involved in her care.  She was given broad-spectrum antibiotics  Patient declined bronchoscopy  She will follow-up with pulmonary  Denies any cough and hypoxia is improved  Since has shown improvement and she has a follow-up scheduled with her pulmonologist.  She continues to be somewhat noncompliant with oxygen and today again oxygen was noted to be lying on the floor  Course complicated by C. difficile infection for which she was also treated  Since then her diarrhea seems to have resolved nicely  At baseline has hypoxic respiratory failure and continues with 2 L of oxygen  She also has COPD and will continue with the nebulizer treatments for that she continues to have shortness of breath  Mood has been stable since she was taken off some of her medications.  She has done well without her Myrbetriq and Vesicare and this will not be resumed at the time of her discharge  She has a history of significant weight loss recently.  Currently her weights are down to 112 pounds.  She has been noted to have low blood pressures and remains symptomatic      Past Medical & Surgical History     PAST MEDICAL HISTORY:   Past Medical History:   Diagnosis Date     ANATOLIY (acute kidney injury) (H)      Allergic rhinitis      Arrhythmia      Atrial fibrillation with RVR (H)      Bacteremia      Breast cancer (H) 2017     Cardiomyopathy (H)      Centrilobular  emphysema (H)      CHF (congestive heart failure) (H)      CKD (chronic kidney disease)      COPD (chronic obstructive pulmonary disease) (H)      Coronary artery disease      Depression      Dysphagia      E. coli sepsis (H)      Factor 5 Leiden mutation, heterozygous (H)      GERD (gastroesophageal reflux disease)      Hx of radiation therapy 2017     Hyperlipidemia      Hypertension      Hypokalemia      Hypomagnesemia      Idiopathic cardiomyopathy (H)      Left hip pain 4/30/2014     OAB (overactive bladder)      Osteoporosis      Sacral insufficiency fracture      Sinusitis      Syncope      Urge incontinence      Vocal cord dysfunction       PAST SURGICAL HISTORY:   has a past surgical history that includes IR Abdominal Aortogram (4/16/2003); IR Miscellaneous Procedure (4/16/2003); IR Aortic Arch 4 Vessel Angiogram (4/16/2003); Arthroplasty revision hip (Left); OPEN TX FEMORAL FRACTURE DISTAL MED/LAT CONDYLE (Left, 10/28/2015); Cardiac catheterization; Cataract Extraction (Left, 07/18/2017); Biopsy breast (Right, 2017); Bladder surgery; Lumpectomy breast (Left, 06/2017); PICC/Midline Placement (4/7/2022); and PICC/Midline Placement (4/11/2022).      Past Social History     Reviewed,  reports that she quit smoking about 14 years ago. She quit smokeless tobacco use about 17 years ago. She reports that she does not drink alcohol and does not use drugs.    Family History     Reviewed, and family history includes Breast Cancer in her maternal aunt; Osteoporosis in an other family member; Prostate Cancer in her brother and maternal uncle.    Medication List   Post Discharge Medication Reconciliation Status: Post Discharge Medication Reconciliation Status: discharge medications reconciled, continue medications without change.  Current Outpatient Medications   Medication     acetaminophen (TYLENOL) 500 MG tablet     albuterol (PROVENTIL) (2.5 MG/3ML) 0.083% neb solution     ascorbic acid, vitamin C, (VITAMIN C) 1000  MG tablet     cetirizine (ZYRTEC) 10 MG tablet     chlorhexidine (PERIDEX) 0.12 % solution     cholecalciferol, vitamin D3, 1,000 unit tablet     EPINEPHrine (EPIPEN/ADRENACLICK/AUVI-Q) 0.3 mg/0.3 mL injection     famotidine (PEPCID) 20 MG tablet     fluticasone (FLONASE) 50 MCG/ACT nasal spray     Fluticasone-Umeclidin-Vilanterol (TRELEGY ELLIPTA) 200-62.5-25 MCG/INH oral inhaler     furosemide (LASIX) 40 MG tablet     gabapentin (NEURONTIN) 100 MG capsule     gabapentin (NEURONTIN) 300 MG capsule     guaiFENesin (MUCINEX) 600 MG 12 hr tablet     ipratropium (ATROVENT HFA) 17 MCG/ACT inhaler     ipratropium - albuterol 0.5 mg/2.5 mg/3 mL (DUONEB) 0.5-2.5 (3) MG/3ML neb solution     lactobacillus rhamnosus, GG, (CULTURELL) capsule     melatonin 5 MG tablet     menthol-zinc oxide (CALMOSEPTINE) 0.44-20.6 % OINT ointment     meropenem (MERREM) 1 g vial     metoprolol succinate ER (TOPROL-XL) 25 MG 24 hr tablet     mirtazapine (REMERON) 15 MG tablet     potassium chloride ER (K-TAB/KLOR-CON) 10 MEQ CR tablet     rivaroxaban ANTICOAGULANT (XARELTO) 15 MG TABS tablet     simvastatin (ZOCOR) 40 MG tablet     SODIUM CHLORIDE 0.9 % IV SOLN     tamoxifen (NOLVADEX) 20 MG tablet     traMADol (ULTRAM) 50 MG tablet     vancomycin (VANCOCIN) 125 MG capsule     vancomycin (VANCOCIN) 125 MG capsule     [START ON 5/13/2022] vancomycin (VANCOCIN) 125 MG capsule     [START ON 5/20/2022] vancomycin (VANCOCIN) 125 MG capsule     No current facility-administered medications for this visit.          Allergies     Allergies   Allergen Reactions     Sulfa (Sulfonamide Antibiotics) [Sulfa Drugs] Rash     Headaches and dizziness.     Homeopathic Drugs [External Allergen Needs Reconciliation - See Comment] Unknown and Other (See Comments)     Comment: runny nose, Comment: runny nose     Mold Extracts [Molds & Smuts] Unknown     Morphine (Pf) [Morphine] Unknown     hallucinate     Lisinopril Cough, Unknown and Itching     Comment: cough,  "Comment: cough     Sulfacetamide Sodium [Sulfacetamide] Rash       Review of Systems   A comprehensive review of 14 systems was done. Pertinent findings noted here and in history of present illness. All the rest negative.  Constitutional: Negative.  Negative for fever, chills, she has  activity change, appetite change and fatigue.   Continues to have low blood pressures and gets dizzy and lightheaded often  But she had profound anorexia.  She reports a weight loss of more than 40 pounds in the last few weeks.  She does not feel like eating and continues to lose weight but now she states that she has gained a few pounds  HENT: Negative for congestion and facial swelling.    Eyes: Negative for photophobia, redness and visual disturbance.   Respiratory: Negative for  chest tightness.  Has cough with some expectoration  Cardiovascular: Negative for chest pain, palpitations and has chronic leg swelling.   Concerned about increased swelling  Cannot tolerate wraps   Blood pressures have been low  Does elevate her legs when resting  Gastrointestinal: Negative for nausea, diarrhea, constipation, blood in stool and abdominal distention.   Reporting bleeding even from a lesion in her rectal area even when she is urinating with copious bleeding reported  Genitourinary: Negative.    Musculoskeletal: Has chronic back issues due to compression fractures in the past  Skin: Negative.    Neurological: Negative for dizziness, tremors, syncope, weakness, light-headedness and headaches.   Endurance is limited and cannot stand for more than 10 minutes  Hematological: Does not bruise/bleed easily.   Psychiatric/Behavioral: Negative.  Mood/anxiety is better however today again anxiety and frustration noted patient gets anxious very easily      Physical Exam   /42   Pulse 80   Temp 98.1  F (36.7  C)   Resp 18   Ht 1.499 m (4' 11\")   Wt 55.8 kg (123 lb)   SpO2 94%   BMI 24.84 kg/m     On 3 L oxygen  Constitutional: Oriented to " person, place, and time and appears well-developed.   HEENT:  Normocephalic and atraumatic.  Eyes: Conjunctivae and EOM are normal. Pupils are equal, round, and reactive to light. No discharge.  No scleral icterus. Nose normal. Mouth/Throat: Oropharynx is clear and moist. No oropharyngeal exudate.    NECK: Normal range of motion. Neck supple. No JVD present. No tracheal deviation present. No thyromegaly present.   CARDIOVASCULAR: Normal rate, regular rhythm and intact distal pulses.  Exam reveals no gallop and no friction rub.  Systolic murmur present.  PULMONARY: Effort normal and breath sounds normal. No respiratory distress.No Wheezing or rales.  ABDOMEN: Soft. Bowel sounds are normal. No distension and no mass.  There is no tenderness. There is no rebound and no guarding. No HSM.  Perianal skin was examined there was no external thrombosed hemorrhoid noted  Skin is intact though slightly abraded from cleaning  Patient did not want a comprehensive vaginal exam but continues to insist that she is bleeding but no lesion was noted she believes she has a lesion there which is causing her to bleed  MUSCULOSKELETAL: Normal range of motion. no tenderness. Mild kyphosis, no tenderness.  LYMPH NODES: Has no cervical, supraclavicular, axillary and groin adenopathy.   NEUROLOGICAL: Alert and oriented to person, place, and time. No cranial nerve deficit.  Normal muscle tone. Coordination normal.   GENITOURINARY: Deferred exam.  SKIN: Skin is warm and dry. No rash noted. No erythema. No pallor.   Stasis dermatitis of both legs with edema and presence of scaling of skin and hyperpigmentary changes which are chronic  EXTREMITIES: No cyanosis, no clubbing, has 1+ lower extremity edema. No Deformity.  PSYCHIATRIC: Normal mood, affect and behavior.      Lab Results     Recent Results (from the past 240 hour(s))   CRP inflammation    Collection Time: 05/02/22  7:04 AM   Result Value Ref Range    CRP 5.2 (H) 0.0 - 0.8 mg/dL    Comprehensive metabolic panel    Collection Time: 05/02/22  7:04 AM   Result Value Ref Range    Sodium 141 136 - 145 mmol/L    Potassium 5.0 3.5 - 5.0 mmol/L    Chloride 107 98 - 107 mmol/L    Carbon Dioxide (CO2) 26 22 - 31 mmol/L    Anion Gap 8 5 - 18 mmol/L    Urea Nitrogen 16 8 - 28 mg/dL    Creatinine 1.11 (H) 0.60 - 1.10 mg/dL    Calcium 8.8 8.5 - 10.5 mg/dL    Glucose 76 70 - 125 mg/dL    Alkaline Phosphatase 75 45 - 120 U/L    AST 18 0 - 40 U/L    ALT <9 0 - 45 U/L    Protein Total 6.9 6.0 - 8.0 g/dL    Albumin 2.4 (L) 3.5 - 5.0 g/dL    Bilirubin Total 0.4 0.0 - 1.0 mg/dL    GFR Estimate 51 (L) >60 mL/min/1.73m2   CBC with platelets and differential    Collection Time: 05/02/22  7:04 AM   Result Value Ref Range    WBC Count 10.1 4.0 - 11.0 10e3/uL    RBC Count 3.54 (L) 3.80 - 5.20 10e6/uL    Hemoglobin 10.2 (L) 11.7 - 15.7 g/dL    Hematocrit 33.2 (L) 35.0 - 47.0 %    MCV 94 78 - 100 fL    MCH 28.8 26.5 - 33.0 pg    MCHC 30.7 (L) 31.5 - 36.5 g/dL    RDW 15.3 (H) 10.0 - 15.0 %    Platelet Count 289 150 - 450 10e3/uL    % Neutrophils 68 %    % Lymphocytes 13 %    % Monocytes 12 %    % Eosinophils 6 %    % Basophils 1 %    % Immature Granulocytes 0 %    NRBCs per 100 WBC 0 <1 /100    Absolute Neutrophils 6.9 1.6 - 8.3 10e3/uL    Absolute Lymphocytes 1.3 0.8 - 5.3 10e3/uL    Absolute Monocytes 1.2 0.0 - 1.3 10e3/uL    Absolute Eosinophils 0.6 0.0 - 0.7 10e3/uL    Absolute Basophils 0.1 0.0 - 0.2 10e3/uL    Absolute Immature Granulocytes 0.0 <=0.4 10e3/uL    Absolute NRBCs 0.0 10e3/uL   Comprehensive metabolic panel    Collection Time: 05/09/22  6:57 AM   Result Value Ref Range    Sodium 142 136 - 145 mmol/L    Potassium 4.3 3.5 - 5.0 mmol/L    Chloride 105 98 - 107 mmol/L    Carbon Dioxide (CO2) 25 22 - 31 mmol/L    Anion Gap 12 5 - 18 mmol/L    Urea Nitrogen 23 8 - 28 mg/dL    Creatinine 1.35 (H) 0.60 - 1.10 mg/dL    Calcium 9.2 8.5 - 10.5 mg/dL    Glucose 68 (L) 70 - 125 mg/dL    Alkaline Phosphatase 63 45 -  120 U/L    AST 23 0 - 40 U/L    ALT <9 0 - 45 U/L    Protein Total 7.7 6.0 - 8.0 g/dL    Albumin 2.7 (L) 3.5 - 5.0 g/dL    Bilirubin Total 0.4 0.0 - 1.0 mg/dL    GFR Estimate 40 (L) >60 mL/min/1.73m2   CRP inflammation    Collection Time: 05/09/22  6:57 AM   Result Value Ref Range    CRP 4.4 (H) 0.0 - 0.8 mg/dL   CBC with platelets and differential    Collection Time: 05/09/22  6:57 AM   Result Value Ref Range    WBC Count 10.1 4.0 - 11.0 10e3/uL    RBC Count 3.83 3.80 - 5.20 10e6/uL    Hemoglobin 10.9 (L) 11.7 - 15.7 g/dL    Hematocrit 36.0 35.0 - 47.0 %    MCV 94 78 - 100 fL    MCH 28.5 26.5 - 33.0 pg    MCHC 30.3 (L) 31.5 - 36.5 g/dL    RDW 15.1 (H) 10.0 - 15.0 %    Platelet Count 289 150 - 450 10e3/uL    % Neutrophils 70 %    % Lymphocytes 14 %    % Monocytes 11 %    % Eosinophils 3 %    % Basophils 1 %    % Immature Granulocytes 1 %    NRBCs per 100 WBC 0 <1 /100    Absolute Neutrophils 7.1 1.6 - 8.3 10e3/uL    Absolute Lymphocytes 1.5 0.8 - 5.3 10e3/uL    Absolute Monocytes 1.1 0.0 - 1.3 10e3/uL    Absolute Eosinophils 0.3 0.0 - 0.7 10e3/uL    Absolute Basophils 0.1 0.0 - 0.2 10e3/uL    Absolute Immature Granulocytes 0.1 <=0.4 10e3/uL    Absolute NRBCs 0.0 10e3/uL     MEDICAL EQUIPMENT NEEDS:     walker/o2  DISCHARGE PLAN/FACE TO FACE:  I certify that services are/were furnished while this patient was under the care of a physician and that a physician or an allowed non-physician practitioner (NPP), had a face-to-face encounter that meets the physician face-to-face encounter requirements. The encounter was in whole, or in part, related to the primary reason for home health. The patient is confined to his/her home and needs intermittent skilled nursing, physical therapy, speech-language pathology, or the continued need for occupational therapy. A plan of care has been established by a physician and is periodically reviewed by a physician.  Date of Face-to-Face Encounter:    5/10/22  I certify that, based on my  findings, the following services are medically necessary home health services: C HHA/RN and PT/OT to evaluate and treat    My clinical findings support the need for the above skilled services because: patient will be discharging to home. Patient will need assistance with medication management and performing IADLs and ADLs effectively and safely.     Patient to re-establish plan of care with their PCP within 7 days after leaving TCU.   Time spent in this discharge visit was 35 minutes including time spent face-to-face with the patient reviewing medications blood pressure concerns follow-up issues noncompliance with oxygen.    Electronically signed by    Paola Rose MD

## 2022-05-10 NOTE — LETTER
5/10/2022        RE: Irene León  [address] [facility] [address]        [facility]       Patient Irene León  MRN: [id number]        Reason for Visit     Chief Complaint   Patient presents with     Discharge Summary Nursing Home   Follow-up SOB/ LYMPHEDEMA    Code Status     DNR only    Assessment     Pneumonia  ZONIA Abscess/ cavitory pneumonia  COPD  Hypoxic respiratory failure on 2 L of oxygen  Acute metabolic encephalopathy/delirium  C. difficile colitis  LYMPHEDEMA  Severe anxiety  Wt loss > 40lb  Generalized weakness    Plan     Pt is admitted to TCU for strengthening and rehab.  Seen today for follow-up for multiple concerns.  Leg wounds are improving.  Weights reviewed with the patient and she is down at 112 pounds and reports her lymphedema is controlled also  Blood pressures were reviewed with her and she is noted to be persistently running low blood pressures in the TCU   She is on a higher dose of Lasix 40 mg daily at her own request.  She also takes high doses of metoprolol.  She reports dizziness and symptomatic lightheadedness and is a falls risk.  After discussion with her we will reduce her Lasix back to 20 mg.  Education provided that if she gains weight or has worsening lymphedema she can always take an extra dose of Lasix  If blood pressures consistently remain low then she will need to discuss it with her primary.  She continues with her vancomycin with the last dose of 5/19/2022.  No diarrhea.  She was reporting rectal bleeding earlier but has no concerns about that today and has minimal concerns.  She will be discharging home on oxygen but overall compliance is limited.  Is also reporting low back pain which was reviewed she chronically has back pain she will make an appointment with orthopedics to discuss this.  She continues with her tramadol and encouraged to use that  She has had a few compression fractures in the past and if this worsens she needs  imaging  In addition CT of the lungs are reviewed and has certainly improved  She has a follow-up scheduled with pulmonary to discuss this  Cognitively she is done better without certain medications which were held in the hospital so we will not resume her Myrbetriq  And vesicare      History     Patient is a very pleasant 77 year old female who is admitted to TCU  Patient was admitted in the hospital with underlying history of COPD with pneumonia.  Her hospitalization was complicated by development of worsening respiratory status.  Repeat CT shows development of left upper lobe cavitation consideration was given for an abscess and pulmonary and infectious disease were involved in her care.  She was given broad-spectrum antibiotics  Patient declined bronchoscopy  She will follow-up with pulmonary  Denies any cough and hypoxia is improved  Since has shown improvement and she has a follow-up scheduled with her pulmonologist.  She continues to be somewhat noncompliant with oxygen and today again oxygen was noted to be lying on the floor  Course complicated by C. difficile infection for which she was also treated  Since then her diarrhea seems to have resolved nicely  At baseline has hypoxic respiratory failure and continues with 2 L of oxygen  She also has COPD and will continue with the nebulizer treatments for that she continues to have shortness of breath  Mood has been stable since she was taken off some of her medications.  She has done well without her Myrbetriq and Vesicare and this will not be resumed at the time of her discharge  She has a history of significant weight loss recently.  Currently her weights are down to 112 pounds.  She has been noted to have low blood pressures and remains symptomatic      Past Medical & Surgical History     PAST MEDICAL HISTORY:   Past Medical History:   Diagnosis Date     ANATOLIY (acute kidney injury) (H)      Allergic rhinitis      Arrhythmia      Atrial fibrillation with RVR  (H)      Bacteremia      Breast cancer (H) 2017     Cardiomyopathy (H)      Centrilobular emphysema (H)      CHF (congestive heart failure) (H)      CKD (chronic kidney disease)      COPD (chronic obstructive pulmonary disease) (H)      Coronary artery disease      Depression      Dysphagia      E. coli sepsis (H)      Factor 5 Leiden mutation, heterozygous (H)      GERD (gastroesophageal reflux disease)      Hx of radiation therapy 2017     Hyperlipidemia      Hypertension      Hypokalemia      Hypomagnesemia      Idiopathic cardiomyopathy (H)      Left hip pain 4/30/2014     OAB (overactive bladder)      Osteoporosis      Sacral insufficiency fracture      Sinusitis      Syncope      Urge incontinence      Vocal cord dysfunction       PAST SURGICAL HISTORY:   has a past surgical history that includes IR Abdominal Aortogram (4/16/2003); IR Miscellaneous Procedure (4/16/2003); IR Aortic Arch 4 Vessel Angiogram (4/16/2003); Arthroplasty revision hip (Left); OPEN TX FEMORAL FRACTURE DISTAL MED/LAT CONDYLE (Left, 10/28/2015); Cardiac catheterization; Cataract Extraction (Left, 07/18/2017); Biopsy breast (Right, 2017); Bladder surgery; Lumpectomy breast (Left, 06/2017); PICC/Midline Placement (4/7/2022); and PICC/Midline Placement (4/11/2022).      Past Social History     Reviewed,  reports that she quit smoking about 14 years ago. She quit smokeless tobacco use about 17 years ago. She reports that she does not drink alcohol and does not use drugs.    Family History     Reviewed, and family history includes Breast Cancer in her maternal aunt; Osteoporosis in an other family member; Prostate Cancer in her brother and maternal uncle.    Medication List   Post Discharge Medication Reconciliation Status: Post Discharge Medication Reconciliation Status: discharge medications reconciled, continue medications without change.  Current Outpatient Medications   Medication     acetaminophen (TYLENOL) 500 MG tablet     albuterol  (PROVENTIL) (2.5 MG/3ML) 0.083% neb solution     ascorbic acid, vitamin C, (VITAMIN C) 1000 MG tablet     cetirizine (ZYRTEC) 10 MG tablet     chlorhexidine (PERIDEX) 0.12 % solution     cholecalciferol, vitamin D3, 1,000 unit tablet     EPINEPHrine (EPIPEN/ADRENACLICK/AUVI-Q) 0.3 mg/0.3 mL injection     famotidine (PEPCID) 20 MG tablet     fluticasone (FLONASE) 50 MCG/ACT nasal spray     Fluticasone-Umeclidin-Vilanterol (TRELEGY ELLIPTA) 200-62.5-25 MCG/INH oral inhaler     furosemide (LASIX) 40 MG tablet     gabapentin (NEURONTIN) 100 MG capsule     gabapentin (NEURONTIN) 300 MG capsule     guaiFENesin (MUCINEX) 600 MG 12 hr tablet     ipratropium (ATROVENT HFA) 17 MCG/ACT inhaler     ipratropium - albuterol 0.5 mg/2.5 mg/3 mL (DUONEB) 0.5-2.5 (3) MG/3ML neb solution     lactobacillus rhamnosus, GG, (CULTURELL) capsule     melatonin 5 MG tablet     menthol-zinc oxide (CALMOSEPTINE) 0.44-20.6 % OINT ointment     meropenem (MERREM) 1 g vial     metoprolol succinate ER (TOPROL-XL) 25 MG 24 hr tablet     mirtazapine (REMERON) 15 MG tablet     potassium chloride ER (K-TAB/KLOR-CON) 10 MEQ CR tablet     rivaroxaban ANTICOAGULANT (XARELTO) 15 MG TABS tablet     simvastatin (ZOCOR) 40 MG tablet     SODIUM CHLORIDE 0.9 % IV SOLN     tamoxifen (NOLVADEX) 20 MG tablet     traMADol (ULTRAM) 50 MG tablet     vancomycin (VANCOCIN) 125 MG capsule     vancomycin (VANCOCIN) 125 MG capsule     [START ON 5/13/2022] vancomycin (VANCOCIN) 125 MG capsule     [START ON 5/20/2022] vancomycin (VANCOCIN) 125 MG capsule     No current facility-administered medications for this visit.          Allergies     Allergies   Allergen Reactions     Sulfa (Sulfonamide Antibiotics) [Sulfa Drugs] Rash     Headaches and dizziness.     Homeopathic Drugs [External Allergen Needs Reconciliation - See Comment] Unknown and Other (See Comments)     Comment: runny nose, Comment: runny nose     Mold Extracts [Molds & Smuts] Unknown     Morphine (Pf) [Morphine]  "Unknown     hallucinate     Lisinopril Cough, Unknown and Itching     Comment: cough, Comment: cough     Sulfacetamide Sodium [Sulfacetamide] Rash       Review of Systems   A comprehensive review of 14 systems was done. Pertinent findings noted here and in history of present illness. All the rest negative.  Constitutional: Negative.  Negative for fever, chills, she has  activity change, appetite change and fatigue.   Continues to have low blood pressures and gets dizzy and lightheaded often  But she had profound anorexia.  She reports a weight loss of more than 40 pounds in the last few weeks.  She does not feel like eating and continues to lose weight but now she states that she has gained a few pounds  HENT: Negative for congestion and facial swelling.    Eyes: Negative for photophobia, redness and visual disturbance.   Respiratory: Negative for  chest tightness.  Has cough with some expectoration  Cardiovascular: Negative for chest pain, palpitations and has chronic leg swelling.   Concerned about increased swelling  Cannot tolerate wraps   Blood pressures have been low  Does elevate her legs when resting  Gastrointestinal: Negative for nausea, diarrhea, constipation, blood in stool and abdominal distention.   Reporting bleeding even from a lesion in her rectal area even when she is urinating with copious bleeding reported  Genitourinary: Negative.    Musculoskeletal: Has chronic back issues due to compression fractures in the past  Skin: Negative.    Neurological: Negative for dizziness, tremors, syncope, weakness, light-headedness and headaches.   Endurance is limited and cannot stand for more than 10 minutes  Hematological: Does not bruise/bleed easily.   Psychiatric/Behavioral: Negative.  Mood/anxiety is better however today again anxiety and frustration noted patient gets anxious very easily      Physical Exam   /42   Pulse 80   Temp 98.1  F (36.7  C)   Resp 18   Ht 1.499 m (4' 11\")   Wt 55.8 kg " (123 lb)   SpO2 94%   BMI 24.84 kg/m     On 3 L oxygen  Constitutional: Oriented to person, place, and time and appears well-developed.   HEENT:  Normocephalic and atraumatic.  Eyes: Conjunctivae and EOM are normal. Pupils are equal, round, and reactive to light. No discharge.  No scleral icterus. Nose normal. Mouth/Throat: Oropharynx is clear and moist. No oropharyngeal exudate.    NECK: Normal range of motion. Neck supple. No JVD present. No tracheal deviation present. No thyromegaly present.   CARDIOVASCULAR: Normal rate, regular rhythm and intact distal pulses.  Exam reveals no gallop and no friction rub.  Systolic murmur present.  PULMONARY: Effort normal and breath sounds normal. No respiratory distress.No Wheezing or rales.  ABDOMEN: Soft. Bowel sounds are normal. No distension and no mass.  There is no tenderness. There is no rebound and no guarding. No HSM.  Perianal skin was examined there was no external thrombosed hemorrhoid noted  Skin is intact though slightly abraded from cleaning  Patient did not want a comprehensive vaginal exam but continues to insist that she is bleeding but no lesion was noted she believes she has a lesion there which is causing her to bleed  MUSCULOSKELETAL: Normal range of motion. no tenderness. Mild kyphosis, no tenderness.  LYMPH NODES: Has no cervical, supraclavicular, axillary and groin adenopathy.   NEUROLOGICAL: Alert and oriented to person, place, and time. No cranial nerve deficit.  Normal muscle tone. Coordination normal.   GENITOURINARY: Deferred exam.  SKIN: Skin is warm and dry. No rash noted. No erythema. No pallor.   Stasis dermatitis of both legs with edema and presence of scaling of skin and hyperpigmentary changes which are chronic  EXTREMITIES: No cyanosis, no clubbing, has 1+ lower extremity edema. No Deformity.  PSYCHIATRIC: Normal mood, affect and behavior.      Lab Results     Recent Results (from the past 240 hour(s))   CRP inflammation    Collection  Time: 05/02/22  7:04 AM   Result Value Ref Range    CRP 5.2 (H) 0.0 - 0.8 mg/dL   Comprehensive metabolic panel    Collection Time: 05/02/22  7:04 AM   Result Value Ref Range    Sodium 141 136 - 145 mmol/L    Potassium 5.0 3.5 - 5.0 mmol/L    Chloride 107 98 - 107 mmol/L    Carbon Dioxide (CO2) 26 22 - 31 mmol/L    Anion Gap 8 5 - 18 mmol/L    Urea Nitrogen 16 8 - 28 mg/dL    Creatinine 1.11 (H) 0.60 - 1.10 mg/dL    Calcium 8.8 8.5 - 10.5 mg/dL    Glucose 76 70 - 125 mg/dL    Alkaline Phosphatase 75 45 - 120 U/L    AST 18 0 - 40 U/L    ALT <9 0 - 45 U/L    Protein Total 6.9 6.0 - 8.0 g/dL    Albumin 2.4 (L) 3.5 - 5.0 g/dL    Bilirubin Total 0.4 0.0 - 1.0 mg/dL    GFR Estimate 51 (L) >60 mL/min/1.73m2   CBC with platelets and differential    Collection Time: 05/02/22  7:04 AM   Result Value Ref Range    WBC Count 10.1 4.0 - 11.0 10e3/uL    RBC Count 3.54 (L) 3.80 - 5.20 10e6/uL    Hemoglobin 10.2 (L) 11.7 - 15.7 g/dL    Hematocrit 33.2 (L) 35.0 - 47.0 %    MCV 94 78 - 100 fL    MCH 28.8 26.5 - 33.0 pg    MCHC 30.7 (L) 31.5 - 36.5 g/dL    RDW 15.3 (H) 10.0 - 15.0 %    Platelet Count 289 150 - 450 10e3/uL    % Neutrophils 68 %    % Lymphocytes 13 %    % Monocytes 12 %    % Eosinophils 6 %    % Basophils 1 %    % Immature Granulocytes 0 %    NRBCs per 100 WBC 0 <1 /100    Absolute Neutrophils 6.9 1.6 - 8.3 10e3/uL    Absolute Lymphocytes 1.3 0.8 - 5.3 10e3/uL    Absolute Monocytes 1.2 0.0 - 1.3 10e3/uL    Absolute Eosinophils 0.6 0.0 - 0.7 10e3/uL    Absolute Basophils 0.1 0.0 - 0.2 10e3/uL    Absolute Immature Granulocytes 0.0 <=0.4 10e3/uL    Absolute NRBCs 0.0 10e3/uL   Comprehensive metabolic panel    Collection Time: 05/09/22  6:57 AM   Result Value Ref Range    Sodium 142 136 - 145 mmol/L    Potassium 4.3 3.5 - 5.0 mmol/L    Chloride 105 98 - 107 mmol/L    Carbon Dioxide (CO2) 25 22 - 31 mmol/L    Anion Gap 12 5 - 18 mmol/L    Urea Nitrogen 23 8 - 28 mg/dL    Creatinine 1.35 (H) 0.60 - 1.10 mg/dL    Calcium 9.2  8.5 - 10.5 mg/dL    Glucose 68 (L) 70 - 125 mg/dL    Alkaline Phosphatase 63 45 - 120 U/L    AST 23 0 - 40 U/L    ALT <9 0 - 45 U/L    Protein Total 7.7 6.0 - 8.0 g/dL    Albumin 2.7 (L) 3.5 - 5.0 g/dL    Bilirubin Total 0.4 0.0 - 1.0 mg/dL    GFR Estimate 40 (L) >60 mL/min/1.73m2   CRP inflammation    Collection Time: 05/09/22  6:57 AM   Result Value Ref Range    CRP 4.4 (H) 0.0 - 0.8 mg/dL   CBC with platelets and differential    Collection Time: 05/09/22  6:57 AM   Result Value Ref Range    WBC Count 10.1 4.0 - 11.0 10e3/uL    RBC Count 3.83 3.80 - 5.20 10e6/uL    Hemoglobin 10.9 (L) 11.7 - 15.7 g/dL    Hematocrit 36.0 35.0 - 47.0 %    MCV 94 78 - 100 fL    MCH 28.5 26.5 - 33.0 pg    MCHC 30.3 (L) 31.5 - 36.5 g/dL    RDW 15.1 (H) 10.0 - 15.0 %    Platelet Count 289 150 - 450 10e3/uL    % Neutrophils 70 %    % Lymphocytes 14 %    % Monocytes 11 %    % Eosinophils 3 %    % Basophils 1 %    % Immature Granulocytes 1 %    NRBCs per 100 WBC 0 <1 /100    Absolute Neutrophils 7.1 1.6 - 8.3 10e3/uL    Absolute Lymphocytes 1.5 0.8 - 5.3 10e3/uL    Absolute Monocytes 1.1 0.0 - 1.3 10e3/uL    Absolute Eosinophils 0.3 0.0 - 0.7 10e3/uL    Absolute Basophils 0.1 0.0 - 0.2 10e3/uL    Absolute Immature Granulocytes 0.1 <=0.4 10e3/uL    Absolute NRBCs 0.0 10e3/uL     MEDICAL EQUIPMENT NEEDS:     walker/o2  DISCHARGE PLAN/FACE TO FACE:  I certify that services are/were furnished while this patient was under the care of a physician and that a physician or an allowed non-physician practitioner (NPP), had a face-to-face encounter that meets the physician face-to-face encounter requirements. The encounter was in whole, or in part, related to the primary reason for home health. The patient is confined to his/her home and needs intermittent skilled nursing, physical therapy, speech-language pathology, or the continued need for occupational therapy. A plan of care has been established by a physician and is periodically reviewed by a  physician.  Date of Face-to-Face Encounter:    5/10/22  I certify that, based on my findings, the following services are medically necessary home health services: HHC HHA/RN and PT/OT to evaluate and treat    My clinical findings support the need for the above skilled services because: patient will be discharging to home. Patient will need assistance with medication management and performing IADLs and ADLs effectively and safely.     Patient to re-establish plan of care with their PCP within 7 days after leaving TCU.   Time spent in this discharge visit was 35 minutes including time spent face-to-face with the patient reviewing medications blood pressure concerns follow-up issues noncompliance with oxygen.    Electronically signed by    Paola Rose MD                             Sincerely,        VASHTI Sanchez

## 2022-05-11 ENCOUNTER — TELEPHONE (OUTPATIENT)
Dept: RESPIRATORY THERAPY | Facility: HOSPITAL | Age: 77
End: 2022-05-11
Payer: MEDICARE

## 2022-05-11 ENCOUNTER — MEDICAL CORRESPONDENCE (OUTPATIENT)
Dept: HEALTH INFORMATION MANAGEMENT | Facility: CLINIC | Age: 77
End: 2022-05-11

## 2022-05-11 ENCOUNTER — OFFICE VISIT (OUTPATIENT)
Dept: INFECTIOUS DISEASES | Facility: CLINIC | Age: 77
End: 2022-05-11
Payer: MEDICARE

## 2022-05-11 VITALS
WEIGHT: 110 LBS | HEART RATE: 88 BPM | DIASTOLIC BLOOD PRESSURE: 60 MMHG | BODY MASS INDEX: 22.22 KG/M2 | TEMPERATURE: 98.9 F | SYSTOLIC BLOOD PRESSURE: 120 MMHG

## 2022-05-11 DIAGNOSIS — J85.1 ABSCESS OF LUNG WITH PNEUMONIA, UNSPECIFIED LATERALITY (H): Primary | ICD-10-CM

## 2022-05-11 RX ORDER — MEROPENEM 1 G/1
1 INJECTION, POWDER, FOR SOLUTION INTRAVENOUS 2 TIMES DAILY
COMMUNITY
End: 2022-05-19

## 2022-05-11 RX ORDER — CEFDINIR 300 MG/1
300 CAPSULE ORAL 2 TIMES DAILY
Qty: 60 CAPSULE | Refills: 0 | Status: SHIPPED | OUTPATIENT
Start: 2022-05-11 | End: 2022-06-10

## 2022-05-11 RX ORDER — METRONIDAZOLE 500 MG/1
500 TABLET ORAL 2 TIMES DAILY
Qty: 60 TABLET | Refills: 0 | Status: SHIPPED | OUTPATIENT
Start: 2022-05-11 | End: 2022-06-10

## 2022-05-11 NOTE — TELEPHONE ENCOUNTER
COPD Education follow up call:  Left message with call back number should pt have questions or concerns and COPD Action Plan reminder.  Sharon Ryan, RT, TTS, Chronic Pulmonary Disease Specialist

## 2022-05-11 NOTE — PROGRESS NOTES
[facility] Infectious Disease  Followup Visit        Assessment:    76 yo female with emphysema    1. Large Cavitary pneumonia. Abscess.   2. Rapid progression into cavitation in hospital while on Zosyn. Radiographic changes may not necessarily equate to antibiotic failure -- likely strep, anaerobes, sometimes gram negatives.   3. With elevated procalcitonin and significant leukocytosis and poor dentition this suggests polymicrobial bacterial infection  4.  concerning is progressive decline over many months and 40 pound weight loss which could signal either malignancy or chronic infection such as mycobacterial or fungal, with mediastinal lymphadenopathy visualized on CT chest    1. Fungal Assay--Aspergillus, Cryptococcal Antigen negative. Beta D Glucan negative. Additional fungal assays negative  Late sputum cx with only Candida  No bronch done  Creat trending up         Plan:   On Iv meropenem since 4/15  Discontinue  Po Omnicef and metronidazole, given prescription for 30 days  CT chest in 3 weeks  Follow-up with pulmonary, to assess for resolution along with us and to assess for any need for bronchoscopy to exclude malignancy underlying this  Follow-up with primary doctor and monitor creatinine  I am ordering follow-up BMP in 1 week  Since Omnicef dose might have to be adjusted      Isai Little M.D.        Present Illness:   This is 77 years old female with left-sided pneumonia lung abscess, with history of weight loss who was seen in the hospital and started on IV meropenem after Zosyn for suspected bacterial pneumonia.  She could not produce enough sputum for culture.  AFB stains were negative and culture remained negative.  All fungal serology came back negative  She has been at [facility].  She reports she is feeling better and she thinks she is close to baseline but her daughter does agree.  She is on home oxygen.  Coughing is at baseline.  No chest pains.  No fever no chills no  diarrhea      Radiology personally reviewed  CT  LUNGS AND PLEURA: Interval decrease in left upper lobe cavitary lesion which measures approximately 2 x 3 x 5.5 cm, previously 5 x 6 x 8 cm. Surrounding consolidation and volume loss has improved slightly with persistent air bronchograms at the caudal   aspect of consolidation     Advanced upper lung predominant emphysema redemonstrated. Near complete resolution of left pleural effusion. Central airways are patent..     Review of Systems  Performed and all negative except as mentioned above.    Past medical, family, and social history reviewed, unchanged from before.        Physical Exam:     Gen. appearance nontoxic  Eyes no conjunctivitis or icterus  Neck no stiffness   Heart  Mild edema  Lungs clear no audible wheeze  Abdomen soft not tender  Extremities no synovitis, trace edema  Skin  no rash or emboli  Neurologic alert oriented no focal deficits      DATA   No results found for any visits on 05/11/22.      sIai Little MD, MD

## 2022-05-11 NOTE — TELEPHONE ENCOUNTER
Called back to TCU. VO given to NATE Griffin and informed written orders sent back with pt. She will have one dose tonight and tomorrow morning and ok to pull picc after and discharge tcu. Pt will  and start oral abx tomorrow.

## 2022-05-13 ENCOUNTER — TELEPHONE (OUTPATIENT)
Dept: INTERNAL MEDICINE | Facility: CLINIC | Age: 77
End: 2022-05-13
Payer: MEDICARE

## 2022-05-13 NOTE — TELEPHONE ENCOUNTER
LM on machine as pt gave permission on previous message from today. Pt needs to go to ER for evaluation. Pt to call back with any questions.

## 2022-05-13 NOTE — TELEPHONE ENCOUNTER
Called pt. She was released from the TCU at [facility] yesterday with physical therapy orders. When I called her, she was extremely out of breath and couldn't talk for at least 5 minutes. 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 She is wondering if she can get a home health aide to come out and help her. She has wounds on both her legs that need to be taken care of as well. Please advise how we should proceed with this pt. I think usually they need an office visit to order home care but it is Friday and Dr Montanez doesn't have openings until Wed. Pt sounded in distress when I talked to her.

## 2022-05-13 NOTE — TELEPHONE ENCOUNTER
I spoke with patient and she is going to go back to the hospital.  Dottie Grove CMA ............... 5:16 PM, 05/13/22

## 2022-05-13 NOTE — TELEPHONE ENCOUNTER
Reason for Call:  Other call back    Detailed comments: Patient called and stated she joust got out of [facility] and is in need of help as soon as possible. Patient states doing it all alone is too hard for her and taking care of her panther is very difficulty. She states she has wounds that need tending to and has no help. Getting food, going up and down the stairs is a challenge. Patient would like a call back as soon as possible. She is wondering about a Home Health [facility] and/or other types of assistants.     Phone Number Patient can be reached at: Home number on file [phone] (home)    Best Time: any    Can we leave a detailed message on this number? YES    Call taken on 5/13/2022 at 3:06 PM by Marzena Ruffin

## 2022-05-13 NOTE — TELEPHONE ENCOUNTER
Correct-- cannot order home health for her without a face-to-face visit.    But it sounds like she is doing so poorly that she may need to be back in the hospital.  She has a really bad problem with a large cavitary pneumonia on the left, very tenuous respiratory status, and has been very sick over the past month.    If she is having that much trouble breathing, she probably needs to be back in the hospital, and alternative need arrangement need to be made as far as care for her  with Alzheimer's.

## 2022-05-16 LAB — BACTERIA SPT CULT: NO GROWTH

## 2022-05-18 ENCOUNTER — TELEPHONE (OUTPATIENT)
Dept: RESPIRATORY THERAPY | Facility: CLINIC | Age: 77
End: 2022-05-18
Payer: MEDICARE

## 2022-05-18 NOTE — TELEPHONE ENCOUNTER
Left message for them call back with any questions they may have, our phone number, reminders, and when we plan to call again.    Safia Davila, RT, Chronic Pulmonary Disease Specialist

## 2022-05-19 ENCOUNTER — VIRTUAL VISIT (OUTPATIENT)
Dept: RADIATION ONCOLOGY | Facility: CLINIC | Age: 77
End: 2022-05-19
Attending: CLINICAL NURSE SPECIALIST
Payer: MEDICARE

## 2022-05-19 DIAGNOSIS — R06.02 SOB (SHORTNESS OF BREATH): ICD-10-CM

## 2022-05-19 DIAGNOSIS — J44.1 COPD EXACERBATION (H): ICD-10-CM

## 2022-05-19 DIAGNOSIS — Z51.5 ENCOUNTER FOR PALLIATIVE CARE: Primary | ICD-10-CM

## 2022-05-19 DIAGNOSIS — R63.0 ANOREXIA: ICD-10-CM

## 2022-05-19 DIAGNOSIS — G89.4 CHRONIC PAIN SYNDROME: ICD-10-CM

## 2022-05-19 DIAGNOSIS — J43.9 PULMONARY EMPHYSEMA, UNSPECIFIED EMPHYSEMA TYPE (H): ICD-10-CM

## 2022-05-19 PROCEDURE — 99215 OFFICE O/P EST HI 40 MIN: CPT | Mod: 95 | Performed by: CLINICAL NURSE SPECIALIST

## 2022-05-19 RX ORDER — GABAPENTIN 100 MG/1
200 CAPSULE ORAL EVERY MORNING
Qty: 60 CAPSULE | Refills: 0 | Status: SHIPPED | OUTPATIENT
Start: 2022-05-19 | End: 2022-10-11

## 2022-05-19 RX ORDER — GABAPENTIN 300 MG/1
300 CAPSULE ORAL AT BEDTIME
Qty: 30 CAPSULE | Refills: 0 | Status: SHIPPED | OUTPATIENT
Start: 2022-05-19 | End: 2022-10-11

## 2022-05-19 RX ORDER — TRAMADOL HYDROCHLORIDE 50 MG/1
50 TABLET ORAL 2 TIMES DAILY
Qty: 60 TABLET | Refills: 0 | Status: SHIPPED | OUTPATIENT
Start: 2022-05-19 | End: 2022-07-11

## 2022-05-19 NOTE — CONFIDENTIAL NOTE
"[facility]  Palliative Care Daily Progress Note    \"This video visit will be conducted via a call between you and your physician/provider. We have found that certain health care needs can be provided without the need for an in-person physical exam.  This service lets us provide the care you need with a video conversation.  If a prescription is necessary, we can send it directly to your pharmacy.  If lab work is needed, we can place an order for that and you can then stop by our lab to have the test done at a later time.     Video visits are billed at different rates depending on your insurance coverage. Please reach out to your insurance provider with any questions.     If during the course of the call the physician/provider feels a video visit is not appropriate, you will not be charged for this service.\"     Patient has given verbal consent to a Video visit? yes     Video Start - End Time:  9539 - 0981    Code status: DNR/DNI     Impressions, Recommendations & Counseling     SYMPTOM ASSESSMENT:  1.  Shortness of breath secondary to advanced COPD and now lung abscess-chronic, continues  Significant activity intolerance as becomes short of breath with minimal exertion in the home.  -Oxygen as at home - 3 L nasal cannula  -Continue with nebulizers, inhalers, and Mucinex   -Continuing following with infectious disease and they are recommended treatments of cefdinir and vancomycin.  Has completed IV meropenem.     2.  Generalized weakness and fatigue secondary to advanced COPD and activity intolerance.  -At baseline, patient limited in mobility in her home.  Difficult for her to get out and about even to doctor's appointments.  -Completed strengthening at TCU and was discharged 5/10/2022.  - Will be starting outpatient PT at [facility] within a week.     3.  Chronic pain syndrome with chronic back pain from T12 compression fracture  3/9/2021  and QTc 516 on EKG.  - Patient sees " orthopedics for injections. Epidural injection 1/20/2022.  - Gabapentin 200 mg by mouth every morning and 300 mg at bedtime.  - ContinueTramadol 50 mg by mouth twice daily as needed for pain.   - Continue Salonpas and Voltaren as needed.     4. Anorexia and weight loss (40 pound weight loss since 3/2021) -continues.  8 pound weight loss in last 10 days (can be diuresis in addition to decreased appetite and oral intake).  - Continue to monitor.   - Boost twice daily.     ADVANCED CARE PLANNING/GOALS OF CARE DISCUSSION  -CODE STATUS: DNR/DNI.    - Goals are otherwise life-prolonging  - POLST document completed 4/13/2022.  -During [facility] hospitalization and for/2022, completion of [employer] document was in process.  -Follow-up with outpatient palliative care in the next 2 months for ongoing goals of care discussion and symptom management and support.        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    Hospitalized at [facility] on 4/7/2022 - 4/22/2022 COPD exacerbation, C. Difficile and ZONIA abscess and cavitary pneumonia.  Patient follows with Sidney Velazquez with ID for long term antibiotics treating cavitary abscess and currently on oral vancomycin and cefdinir.  Was seen in the ED/Urgency room 5/2/2022 for RLE skin tear  Patient was discharged to assisted living for strengthening and discharged from there on 5/10/2022.    Today, the patient was seen for:  Chronic pain syndrome, weakness, weight loss/malnutrition, goals of care, emotional support    Prognosis, Goals, or Advance Care Planning was  addressed today with: Yes.  Mood, coping, and/or meaning in the context of serious illness were addressed today: Yes.  Summary/Comments: Patient very happy to be back home after April hospital stay followed by transitional care for rehab and strengthening.  She was grateful for conversation regarding goals of care.  She confirms wishes for ongoing DNR/I.  States her decision makers would be her sonYovanny and daughterLeelee should she not be able to make decisions for herself.  She is not completed the honoring choices document and wishes for palliative care to reach out to daughterLeelee to assist with getting the document to her.  Patient has life-prolonging goals as she would want medical treatments to treat underlying conditions.            Key Palliative Symptoms:  # Pain severity the last 12 hours: moderate  # Dyspnea severity the last 12 hours: moderate  # Nausea severity the last 12 hours: low  # Anxiety severity the last 12 hours: low   Left hip and mid back keeps pain at a 4/10 after taking gabapentin and tramadol.  Diarrhea: none; recently stopped - recovered from C diff  Constipation: denies  Weakness/Fatigue: moderate  Anorexia: moderate-severe  Weight: lost 8 pounds since 5/9/2022  Patient is on opioids: assessed and bowels ok/no needed changes to plan of care today.     Additional ROS was reviewed and is unremarkable.           Medications:     I have reviewed this patient's medication profile and PDMP.           Physical Exam:   General appearance: alert, cachectic, cooperative and fatigued  Head: Normocephalic, without obvious abnormality, atraumatic, Temporal wasting noted  Eyes: Lids and lashes normal  Nose: no discharge, Nasal cannula in place  Throat: Lips without lesions.  Lungs: Labored with speaking.  Productive cough during conversation/visit.  Chronic per patient report  Neurologic: Alert.  Oriented x4.  Wt Readings from Last 3 Encounters:   05/11/22 49.9 kg (110 lb)   05/10/22 55.8 kg  (123 lb)   05/09/22 55.8 kg (123 lb)     Weighed self 5/18/2022 102.6 lbs on home scale.           Data Reviewed:     Pertinent Labs  Lab Results: personally reviewed.   Lab Results   Component Value Date     05/09/2022    CO2 25 05/09/2022    BUN 23 05/09/2022     Lab Results   Component Value Date    WBC 10.1 05/09/2022    HGB 10.9 05/09/2022    HCT 36.0 05/09/2022    MCV 94 05/09/2022     05/09/2022     AST   Date Value Ref Range Status   05/09/2022 23 0 - 40 U/L Final     ALT   Date Value Ref Range Status   05/09/2022 <9 0 - 45 U/L Final     Alkaline Phosphatase   Date Value Ref Range Status   05/09/2022 63 45 - 120 U/L Final     Albumin   Date Value Ref Range Status   05/09/2022 2.7 (L) 3.5 - 5.0 g/dL Final         Radiology Results  CT Chest w/o Contrast    Result Date: 5/6/2022  EXAM: CT CHEST W/O CONTRAST LOCATION: [facility] DATE/TIME: 5/6/2022 1:19 PM INDICATION: Follow-up left upper lobe necrotizing pneumonia with cavitation/abscess. COMPARISON: 4/15/2022 and 4/8/2022 TECHNIQUE: CT chest without IV contrast. Multiplanar reformats were obtained. Dose reduction techniques were used. CONTRAST: None. FINDINGS: LUNGS AND PLEURA: Interval decrease in left upper lobe cavitary lesion which measures approximately 2 x 3 x 5.5 cm, previously 5 x 6 x 8 cm. Surrounding consolidation and volume loss has improved slightly with persistent air bronchograms at the caudal aspect of consolidation Advanced upper lung predominant emphysema redemonstrated. Near complete resolution of left pleural effusion. Central airways are patent.. MEDIASTINUM/AXILLAE: No mass/adenopathy nor pericardial effusion. The thoracic aorta and heart are normal in size. CORONARY ARTERY CALCIFICATION: Moderate. UPPER ABDOMEN: Negative. MUSCULOSKELETAL: Kyphoscoliosis and chronic severe T12 compression deformity redemonstrated. No suspicious osseous lesions.     IMPRESSION: 1.  Interval improvement in necrotizing  pneumonia left upper lobe with decrease in size of central cavity and moderate residual surrounding consolidative opacity. 2.  Advanced emphysema redemonstrated.      TTS: I have personally spent a total of 40 minutes today reviewing patient's medical record, consultation with the medical providers, assessing patient and symptoms and providing emotional support with more than 50% of this time spent face-to-face in video visit discussing goals of care and symptoms and reviewing medications.    LEYLA Walker, DELMIS, CNS  Palliative Care  [phone]

## 2022-05-19 NOTE — Clinical Note
Follow-up with outpatient palliative care in the next 2 months for ongoing goals of care discussion and Sx management.

## 2022-05-19 NOTE — PROGRESS NOTES
Stephanie is a 77 year old who is being evaluated via a billable video visit.      How would you like to obtain your AVS? MyChart  If the video visit is dropped, the invitation should be resent by: Text to cell phone: [phone]  Will anyone else be joining your video visit? No      Video Start Time: 10:20  Video-Visit Details    Type of service:  Video Visit    Video End Time:    Originating Location (pt. Location): Home    Distant Location (provider location):  [facility] RADIATION ONCOLOGY [place]     Platform used for Video Visit: Steve     Pt called prior to video visit, see assessment. Further recommendations and orders per provider.

## 2022-05-25 ENCOUNTER — MEDICAL CORRESPONDENCE (OUTPATIENT)
Dept: HEALTH INFORMATION MANAGEMENT | Facility: CLINIC | Age: 77
End: 2022-05-25

## 2022-05-25 ENCOUNTER — TELEPHONE (OUTPATIENT)
Dept: RESPIRATORY THERAPY | Facility: HOSPITAL | Age: 77
End: 2022-05-25
Payer: MEDICARE

## 2022-05-26 ENCOUNTER — PATIENT OUTREACH (OUTPATIENT)
Dept: NURSING | Facility: CLINIC | Age: 77
End: 2022-05-26
Payer: MEDICARE

## 2022-05-26 ENCOUNTER — PATIENT OUTREACH (OUTPATIENT)
Dept: CARE COORDINATION | Facility: CLINIC | Age: 77
End: 2022-05-26
Payer: MEDICARE

## 2022-05-26 DIAGNOSIS — J43.9 PULMONARY EMPHYSEMA, UNSPECIFIED EMPHYSEMA TYPE (H): Primary | ICD-10-CM

## 2022-05-26 NOTE — PROGRESS NOTES
Clinic Care Coordination Contact    Care Coordination Transition Communication           Clinical Data: Patient was hospitalized at  from 4/7 to 4/22 with diagnosis of Community Acquired Pneumonia   ZONIA pulmonary abscess  C-difficile positive  COPD exacerbation  Anxiety.      Transition to Facility:              Facility Name: [facility]              Contact name and phone number/fax: [phone]     Plan:  Patient now discharged to home.  She has a COPD Care Coordinator and attends Palliative Care.  She will soon be starting outpatient therapy.  There is a note in the chart from the PCP that she may be caring for her spouse.  Please CHW to outreach to patient to inquire if there are additional needs or resources that she may want.

## 2022-05-26 NOTE — PROGRESS NOTES
Clinic Care Coordination Contact  Community Health Worker Initial Outreach            Patient accepts CC: No, patient states she is doign well as everything she needs at this time, her  is assisiting with her cares. Patient will be sent Care Coordination introduction letter for future reference.   Patient has PCP appt 6/2/2022 @ 11am    CHW emailed introduction letter to Stephanie donaldson per patients request- She was overjoyed with the phone call and finding out more about CCC  [email or web address]

## 2022-06-01 ENCOUNTER — HOSPITAL ENCOUNTER (OUTPATIENT)
Dept: CT IMAGING | Facility: CLINIC | Age: 77
Discharge: HOME | End: 2022-06-01
Attending: INTERNAL MEDICINE | Admitting: INTERNAL MEDICINE
Payer: MEDICARE

## 2022-06-01 DIAGNOSIS — J85.1 ABSCESS OF LUNG WITH PNEUMONIA, UNSPECIFIED LATERALITY (H): ICD-10-CM

## 2022-06-01 PROCEDURE — 71250 CT THORAX DX C-: CPT | Mod: ME

## 2022-06-02 ENCOUNTER — OFFICE VISIT (OUTPATIENT)
Dept: INTERNAL MEDICINE | Facility: CLINIC | Age: 77
End: 2022-06-02
Payer: MEDICARE

## 2022-06-02 VITALS — DIASTOLIC BLOOD PRESSURE: 76 MMHG | BODY MASS INDEX: 21.41 KG/M2 | WEIGHT: 106 LBS | SYSTOLIC BLOOD PRESSURE: 122 MMHG

## 2022-06-02 DIAGNOSIS — I50.22 CHRONIC SYSTOLIC CONGESTIVE HEART FAILURE (H): ICD-10-CM

## 2022-06-02 DIAGNOSIS — J43.9 PULMONARY EMPHYSEMA, UNSPECIFIED EMPHYSEMA TYPE (H): Primary | ICD-10-CM

## 2022-06-02 DIAGNOSIS — I25.10 CORONARY ARTERY DISEASE DUE TO CALCIFIED CORONARY LESION: ICD-10-CM

## 2022-06-02 DIAGNOSIS — J98.4 CAVITARY PNEUMONIA: ICD-10-CM

## 2022-06-02 DIAGNOSIS — J18.9 CAVITARY PNEUMONIA: ICD-10-CM

## 2022-06-02 DIAGNOSIS — Z86.19 HISTORY OF CLOSTRIDIOIDES DIFFICILE COLITIS: ICD-10-CM

## 2022-06-02 DIAGNOSIS — N18.2 CHRONIC RENAL INSUFFICIENCY, STAGE 2 (MILD): ICD-10-CM

## 2022-06-02 DIAGNOSIS — I25.84 CORONARY ARTERY DISEASE DUE TO CALCIFIED CORONARY LESION: ICD-10-CM

## 2022-06-02 DIAGNOSIS — J44.1 COPD EXACERBATION (H): ICD-10-CM

## 2022-06-02 DIAGNOSIS — I48.20 CHRONIC ATRIAL FIBRILLATION (H): ICD-10-CM

## 2022-06-02 DIAGNOSIS — J85.1 ABSCESS OF LUNG WITH PNEUMONIA, UNSPECIFIED LATERALITY (H): ICD-10-CM

## 2022-06-02 LAB
ANION GAP SERPL CALCULATED.3IONS-SCNC: 14 MMOL/L (ref 5–18)
BUN SERPL-MCNC: 15 MG/DL (ref 8–28)
CALCIUM SERPL-MCNC: 9.3 MG/DL (ref 8.5–10.5)
CHLORIDE BLD-SCNC: 103 MMOL/L (ref 98–107)
CO2 SERPL-SCNC: 23 MMOL/L (ref 22–31)
CREAT SERPL-MCNC: 1.43 MG/DL (ref 0.6–1.1)
GFR SERPL CREATININE-BSD FRML MDRD: 38 ML/MIN/1.73M2
GLUCOSE BLD-MCNC: 92 MG/DL (ref 70–125)
POTASSIUM BLD-SCNC: 4.2 MMOL/L (ref 3.5–5)
SODIUM SERPL-SCNC: 140 MMOL/L (ref 136–145)

## 2022-06-02 PROCEDURE — 80048 BASIC METABOLIC PNL TOTAL CA: CPT | Performed by: INTERNAL MEDICINE

## 2022-06-02 PROCEDURE — 36415 COLL VENOUS BLD VENIPUNCTURE: CPT | Performed by: INTERNAL MEDICINE

## 2022-06-02 PROCEDURE — 99214 OFFICE O/P EST MOD 30 MIN: CPT | Performed by: INTERNAL MEDICINE

## 2022-06-02 RX ORDER — IPRATROPIUM BROMIDE AND ALBUTEROL SULFATE 2.5; .5 MG/3ML; MG/3ML
3 SOLUTION RESPIRATORY (INHALATION) 4 TIMES DAILY
Qty: 90 ML | Refills: 4 | Status: ON HOLD | OUTPATIENT
Start: 2022-06-02 | End: 2023-01-01

## 2022-06-02 NOTE — PROGRESS NOTES
[facility] clinic Follow Up Note    Irene León   77 year old female    Date of Visit: 6/2/2022    Chief Complaint   Patient presents with     Hospital F/U     Pneumonia, CT results     Subjective  Stephanie is here with  for follow-up after prolonged hospitalization and then TCU stay for cavitary pneumonia.    She has severe COPD oxygen dependent but not on chronic steroids.  She quit smoking over 14 years ago.    She developed increasing shortness of breath with cough and was diagnosed with a left upper lobe cavitary pneumonia on top of severe COPD.  She has poor dentition, felt to be an aspiration type pneumonia.    She did not want to proceed with bronchoscopy.  AFB sputum's were negative.  Fungal serologies were negative.  Treated with IV meropenem since April 15 until May.  She was seen by infectious disease in May and changed to Omnicef and Flagyl for 30 days, patient did states she was taking the antibiotics twice a day.    History of C. difficile colitis in the hospital, but she finished her treatment with oral vancomycin.  Her stools are thickening up over the past week.    Patient has not been using her nebulizer machine since she returned home.  She has not been taking Mucinex.    She does report that her cough is changed to a more constant cough over the past week.  She denies any worsening shortness of breath or fever.  No new chest pain.    Lower extremity edema is well controlled on Lasix 40 mg a day.  She has chronic lower extremity edema.    April 2022 heart echo did show reduced ejection fraction down to 35-40% with global hypokinesis.  No aortic stenosis and just trace mitral regurgitation.    Her echo back July 2021 showed ejection fraction 50-60% with mild pulmonary hypertension.    She has chronic atrial fibrillation on Xarelto.  Also history of DVT.  On Toprol-XL 75 mg a day.    She also has mild coronary disease seen on angiogram in 2003 but a negative stress test in 2016.   History of occipital stroke with high-grade intracranial artery stenosis.  On simvastatin 40 mg.    Stage I breast cancer with lumpectomy with radiation in 2017 but she will be finishing her tamoxifen soon.  Negative mammogram 1 year ago.    Chronic back pain without complaints today.  History of compression fractures.    Patient had delirium in the hospital and no longer taking Vesicare or Myrbetriq.  Patient was given Remeron at the TCU but reports not using that any further.    He is still using gabapentin at night for chronic pain.    She did have worsening of her chronic renal insufficiency in the hospital.  Creatinine in April was 0.99.  Creatinine on May 9 was 1.35.    She also an ATN event with sepsis back in March 2021.    I did review the chest CT scan that was done yesterday.  There was improvement in the left upper lobe cavitary pneumonia.  But there was some new possible early infiltrate in her right lower lobe.        PMHx:    Past Medical History:   Diagnosis Date     ANATOLIY (acute kidney injury) (H)      Allergic rhinitis      Arrhythmia      Atrial fibrillation with RVR (H)      Bacteremia      Breast cancer (H) 2017     Cardiomyopathy (H)      Centrilobular emphysema (H)      CHF (congestive heart failure) (H)      CKD (chronic kidney disease)      COPD (chronic obstructive pulmonary disease) (H)      Coronary artery disease      Depression      Dysphagia      E. coli sepsis (H)      Factor 5 Leiden mutation, heterozygous (H)      GERD (gastroesophageal reflux disease)      Hx of radiation therapy 2017     Hyperlipidemia      Hypertension      Hypokalemia      Hypomagnesemia      Idiopathic cardiomyopathy (H)      Left hip pain 4/30/2014     OAB (overactive bladder)      Osteoporosis      Sacral insufficiency fracture      Sinusitis      Syncope      Urge incontinence      Vocal cord dysfunction      PSHx:    Past Surgical History:   Procedure Laterality Date     ARTHROPLASTY REVISION HIP Left       BIOPSY BREAST Right 2017     BLADDER SURGERY      bladder interstim with removal     CARDIAC CATHETERIZATION       CATARACT EXTRACTION Left 07/18/2017     IR ABDOMINAL AORTOGRAM  4/16/2003     IR AORTIC ARCH 4 VESSEL ANGIOGRAM  4/16/2003     IR MISCELLANEOUS PROCEDURE  4/16/2003     LUMPECTOMY BREAST Left 06/2017    x2     PICC DOUBLE LUMEN PLACEMENT  4/11/2022          PICC TRIPLE LUMEN PLACEMENT  4/7/2022          ZZC OPEN TX FEMORAL FRACTURE DISTAL MED/LAT CONDYLE Left 10/28/2015    Procedure: OPEN REDUCTION INTERNAL FIXATION LEFT  PROXIMAL FEMUR PERIPROSTHETIC FRACTURE;  Surgeon: Yovanny Albarran MD;  Location: [facility];  Service: Orthopedics     Immunizations:   Immunization History   Administered Date(s) Administered     COVID-19,PF,Pfizer (12+ Yrs) 03/02/2021, 03/23/2021, 12/08/2021     FLU 6-35 months 11/03/2003     Flu 65+ Years 09/20/2017, 09/25/2018, 10/10/2019     Flu, Unspecified 10/01/2016, 10/01/2018     [id number] Novel Flu 01/05/2010     Influenza (H1N1) 01/05/2010     Influenza (High Dose) 3 valent vaccine 10/30/2014, 10/01/2015, 11/26/2016, 09/25/2018, 10/10/2019, 10/19/2020     Influenza (IIV3) PF 11/03/2003, 11/08/2006, 10/24/2007, 11/28/2008, 10/09/2009, 10/22/2010, 01/26/2012, 10/03/2012, 11/01/2013     Influenza, Quad, High Dose, Pf, 65yr+ (Fluzone HD) 10/19/2020, 12/15/2020     Mantoux Tuberculin Skin Test 12/13/2007, 10/24/2020, 11/03/2020     Pneumo Conj 13-V (2010&after) 05/11/2015     Pneumococcal 23 valent 11/24/1997, 11/21/2007, 05/17/2017     TD (ADULT, 7+) 07/20/2004     TDAP Vaccine (Boostrix) 02/12/2016     Tdap (Adacel,Boostrix) 02/12/2016     Zoster vaccine recombinant adjuvanted (SHINGRIX) 07/31/2020     Zoster vaccine, live 02/18/2009, 10/20/2014       ROS A comprehensive review of systems was performed and was otherwise negative    Medications, allergies, and problem list were reviewed and updated    Exam  /76 (BP Location: Left arm, Patient Position: Sitting,  Cuff Size: Adult Small)   Wt 48.1 kg (106 lb)   BMI 21.41 kg/m    Frail elderly female with severe kyphosis.  Decreased breath sounds throughout, but there was no rhonchi or wheezing or crackle on exam today.  I did not hear any right lower lobe crackles.  Unable to get oxygen saturation with her fingernails pressed on colored.  Abdomen soft nontender.  There is trace ankle edema bilaterally, old abrasions noted, healing well.  No open sores on legs.  Patient is alert and oriented x3, does not appear in respiratory distress, not having difficulty breathing today.  No accessory muscle use.  Heart is irregularly irregular, I did not hear murmur.  No rub.    Assessment/Plan  1. Pulmonary emphysema, unspecified emphysema type (H)  Severe.  Oxygen dependent.  Restart nebulizer.  She was given a prescription for new nebulizer machine.    Continue Trelegy Ellipta inhaler and oxygen.  - Nebulizer and Supplies Order for DME - ONLY FOR DME  - ipratropium - albuterol 0.5 mg/2.5 mg/3 mL (DUONEB) 0.5-2.5 (3) MG/3ML neb solution; Take 1 vial (3 mLs) by nebulization 4 times daily  Dispense: 90 mL; Refill: 4    2. Cavitary pneumonia  Improving cavitary pneumonia in left upper lobe.    Chest CT scan yesterday with possible suggestion of early infiltrate in right lower lobe.  She does complain of some more persistent cough.  But no fever or worsening shortness of breath.    She is already on the Omnicef and Flagyl.  I stressed the importance of confirming that she is taking the antibiotics as prescribed.  Continue antibiotics as planned, at least until June 11.  She will follow-up with infectious disease on Letty 15.    Patient was given strong warnings that if she has any worsening cough or fever or worsening shortness of breath that she should go to the emergency room for immediate evaluation as she may need to be evaluated for IV antibiotics in the hospital.    History of long QTc, voiding macrolide antibiotics or Cipro/Levaquin.   History of sulfa allergy.    3. History of Clostridioides difficile colitis  Stool is thickening up.  Encouraged increased fiber in diet.  Completed oral vancomycin.  On oral Flagyl.    4. Chronic renal insufficiency, stage 2 (mild)  Some worsening creatinine on check last month.  Recheck kidney labs as ordered by her infectious disease doctor.    5. Chronic systolic congestive heart failure (H)  Lower extremity edema controlled with furosemide.  Hypokinesis consistent with acute illness with her pneumonia.  Also history of pulmonary hypertension there is severe COPD.    Consider repeat heart echo when she has further recovered    6. Chronic atrial fibrillation (H)  Rate controlled with metoprolol.  Xarelto.    7. Coronary artery disease due to calcified coronary lesion  Mild coronary disease on previous angiogram.  History of severe intracranial artery stenosis with previous stroke.    Continues on simvastatin 40 mg    8. COPD exacerbation (H)  Refill of nebulizer as above  - ipratropium - albuterol 0.5 mg/2.5 mg/3 mL (DUONEB) 0.5-2.5 (3) MG/3ML neb solution; Take 1 vial (3 mLs) by nebulization 4 times daily  Dispense: 90 mL; Refill: 4    History of breast cancer without recurrence.  Should be stopping Taxol this year.  Mammogram due this summer.    History of compression fractures of the spine with kyphosis.  Avoid sedating medication in the evening that may exacerbate hypoxia.  Chronic pain.  Avoid tramadol.  She was told she could stop the gabapentin if able.    History of delirium in the hospital, did not restart Vesicare or Myrbetriq.    She is now DNR CODE STATUS.    Extensive review of chart, including review of the chest CT scan from yesterday, lab work, infectious disease note from May and discharge summary reviewed.  Time with patient over 1 hour today.      Return in about 26 days (around 6/28/2022) for Follow up.   Patient Instructions   If you do develop increasing shortness of breath or fever, or  your cough continues to worsen, go to the emergency room for immediate evaluation as he may need admission to the hospital for additional pneumonia treatment.    Restart your nebulizer machine 4 times a day.  Make sure you are taking the Omnicef and metronidazole antibiotic as prescribed twice a day.    You can stop the gabapentin at night.  You do not need to take the vitamin C or your Zyrtec.    Follow-up with me in 3 to 4 weeks in clinic.    To help thicken your stool, you can increase fiber in diet such as oatmeal or taking Metamucil fiber on a daily basis.    Put on your [employer] computer portal for results of the kidney labs that were checked today.    Pankaj Montanez MD, MD        Current Outpatient Medications   Medication Sig Dispense Refill     acetaminophen (TYLENOL) 500 MG tablet Take 1,000 mg by mouth every 8 hours as needed for mild pain or fever        albuterol (PROVENTIL) (2.5 MG/3ML) 0.083% neb solution Take 1 vial (2.5 mg) by nebulization every 2 hours as needed for shortness of breath / dyspnea 90 mL 0     ascorbic acid, vitamin C, (VITAMIN C) 1000 MG tablet [ASCORBIC ACID, VITAMIN C, (VITAMIN C) 1000 MG TABLET] Take 1,000 mg by mouth daily. Airborne 1 tab daily       chlorhexidine (PERIDEX) 0.12 % solution [CHLORHEXIDINE (PERIDEX) 0.12 % SOLUTION] Apply 15 mL to the mouth or throat at bedtime as needed.        cholecalciferol, vitamin D3, 1,000 unit tablet [CHOLECALCIFEROL, VITAMIN D3, 1,000 UNIT TABLET] Take 1,000 Units by mouth daily.       EPINEPHrine (EPIPEN/ADRENACLICK/AUVI-Q) 0.3 mg/0.3 mL injection Inject 0.3 mg into the muscle as needed for anaphylaxis        famotidine (PEPCID) 20 MG tablet Take 1 tablet (20 mg) by mouth At Bedtime 30 tablet 0     Fluticasone-Umeclidin-Vilanterol (TRELEGY ELLIPTA) 200-62.5-25 MCG/INH oral inhaler Inhale 1 puff into the lungs At Bedtime       furosemide (LASIX) 40 MG tablet Take 40 mg by mouth daily       gabapentin (NEURONTIN) 100 MG capsule Take 2  capsules (200 mg) by mouth every morning 60 capsule 0     gabapentin (NEURONTIN) 300 MG capsule Take 1 capsule (300 mg) by mouth At Bedtime 30 capsule 0     ipratropium (ATROVENT HFA) 17 MCG/ACT inhaler [ATROVENT HFA 17 MCG/ACTUATION INHALER] USE 2 INHALATIONS EVERY 6 HOURS 77.4 g 3     ipratropium - albuterol 0.5 mg/2.5 mg/3 mL (DUONEB) 0.5-2.5 (3) MG/3ML neb solution Take 1 vial (3 mLs) by nebulization 4 times daily 90 mL 4     melatonin 5 MG tablet Take 1 tablet (5 mg) by mouth At Bedtime 30 tablet 0     metoprolol succinate ER (TOPROL-XL) 25 MG 24 hr tablet Take 3 tablets (75 mg) by mouth At Bedtime 90 tablet 0     metroNIDAZOLE (FLAGYL) 500 MG tablet Take 1 tablet (500 mg) by mouth 2 times daily 60 tablet 0     potassium chloride ER (K-TAB/KLOR-CON) 10 MEQ CR tablet Take 1 tablet (10 mEq) by mouth 2 times daily 180 tablet 3     rivaroxaban ANTICOAGULANT (XARELTO) 15 MG TABS tablet Take 1 tablet (15 mg) by mouth At Bedtime 90 tablet 3     simvastatin (ZOCOR) 40 MG tablet Take 1 tablet (40 mg) by mouth At Bedtime 90 tablet 3     tamoxifen (NOLVADEX) 20 MG tablet TAKE 1 TABLET DAILY 90 tablet 3     traMADol (ULTRAM) 50 MG tablet Take 1 tablet (50 mg) by mouth 2 times daily 60 tablet 0     cefdinir (OMNICEF) 300 MG capsule Take 1 capsule (300 mg) by mouth 2 times daily (Patient not taking: Reported on 6/2/2022) 60 capsule 0     cetirizine (ZYRTEC) 10 MG tablet Take 10 mg by mouth daily as needed for allergies  (Patient not taking: Reported on 6/2/2022)       fluticasone (FLONASE) 50 MCG/ACT nasal spray Spray 1 spray into both nostrils daily (Patient not taking: Reported on 6/2/2022) 15.8 mL 0     guaiFENesin (MUCINEX) 600 MG 12 hr tablet Take 2 tablets (1,200 mg) by mouth 2 times daily (Patient not taking: Reported on 6/2/2022) 60 tablet 0     lactobacillus rhamnosus, GG, (CULTURELL) capsule Take 1 capsule by mouth 2 times daily (Patient not taking: Reported on 6/2/2022) 60 capsule 0     menthol-zinc oxide  (CALMOSEPTINE) 0.44-20.6 % OINT ointment Apply topically 4 times daily as needed for skin protection (Patient not taking: Reported on 2022) 113 g 0     Allergies   Allergen Reactions     Sulfa (Sulfonamide Antibiotics) [Sulfa Drugs] Rash     Headaches and dizziness.     Homeopathic Drugs [External Allergen Needs Reconciliation - See Comment] Unknown and Other (See Comments)     Comment: runny nose, Comment: runny nose     Mold Extracts [Molds & Smuts] Unknown     Morphine (Pf) [Morphine] Unknown     hallucinate     Lisinopril Cough, Unknown and Itching     Comment: cough, Comment: cough     Sulfacetamide Sodium [Sulfacetamide] Rash     Social History     Tobacco Use     Smoking status: Former Smoker     Quit date: 10/28/2007     Years since quittin.6     Smokeless tobacco: Former User     Quit date: 2004   Substance Use Topics     Alcohol use: No     Drug use: No

## 2022-06-02 NOTE — PATIENT INSTRUCTIONS
If you do develop increasing shortness of breath or fever, or your cough continues to worsen, go to the emergency room for immediate evaluation as he may need admission to the hospital for additional pneumonia treatment.    Restart your nebulizer machine 4 times a day.  Make sure you are taking the Omnicef and metronidazole antibiotic as prescribed twice a day.    You can stop the gabapentin at night.  You do not need to take the vitamin C or your Zyrtec.    Follow-up with me in 3 to 4 weeks in clinic.    To help thicken your stool, you can increase fiber in diet such as oatmeal or taking Metamucil fiber on a daily basis.    Put on your [employer] computer portal for results of the kidney labs that were checked today.

## 2022-06-13 LAB
ACID FAST STAIN (ARUP): NORMAL

## 2022-06-14 ENCOUNTER — TELEPHONE (OUTPATIENT)
Dept: RESPIRATORY THERAPY | Facility: CLINIC | Age: 77
End: 2022-06-14
Payer: MEDICARE

## 2022-06-14 NOTE — TELEPHONE ENCOUNTER
Spoke with Stephanie, doing good, no questions for me to day. Having an issue with her pulmonary clinic sending over paperwork for her re-certification of her oxygen so it will be paid for. Stephanie is hoping after her visit with her last week it is now taken care of. Reviewed their action plan, in their green zone.  Reminded when we will call again and to call before if there is a question.  Safia Davila, RT, TTS, Chronic Pulmonary Disease Specialist

## 2022-06-15 ENCOUNTER — OFFICE VISIT (OUTPATIENT)
Dept: INFECTIOUS DISEASES | Facility: CLINIC | Age: 77
End: 2022-06-15
Payer: MEDICARE

## 2022-06-15 ENCOUNTER — LAB (OUTPATIENT)
Dept: LAB | Facility: CLINIC | Age: 77
End: 2022-06-15

## 2022-06-15 VITALS
DIASTOLIC BLOOD PRESSURE: 60 MMHG | BODY MASS INDEX: 20.8 KG/M2 | WEIGHT: 103 LBS | SYSTOLIC BLOOD PRESSURE: 110 MMHG | TEMPERATURE: 97.9 F | HEART RATE: 64 BPM

## 2022-06-15 DIAGNOSIS — J85.1 ABSCESS OF LUNG WITH PNEUMONIA, UNSPECIFIED LATERALITY (H): Primary | ICD-10-CM

## 2022-06-15 DIAGNOSIS — J85.1 ABSCESS OF LUNG WITH PNEUMONIA, UNSPECIFIED LATERALITY (H): ICD-10-CM

## 2022-06-15 LAB
BASOPHILS # BLD AUTO: 0.1 10E3/UL (ref 0–0.2)
BASOPHILS NFR BLD AUTO: 1 %
C REACTIVE PROTEIN LHE: 0.3 MG/DL (ref 0–0.8)
EOSINOPHIL # BLD AUTO: 0.2 10E3/UL (ref 0–0.7)
EOSINOPHIL NFR BLD AUTO: 3 %
ERYTHROCYTE [DISTWIDTH] IN BLOOD BY AUTOMATED COUNT: 15.4 % (ref 10–15)
HCT VFR BLD AUTO: 35.6 % (ref 35–47)
HGB BLD-MCNC: 11.2 G/DL (ref 11.7–15.7)
IMM GRANULOCYTES # BLD: 0 10E3/UL
IMM GRANULOCYTES NFR BLD: 0 %
LYMPHOCYTES # BLD AUTO: 1.4 10E3/UL (ref 0.8–5.3)
LYMPHOCYTES NFR BLD AUTO: 19 %
MCH RBC QN AUTO: 28.2 PG (ref 26.5–33)
MCHC RBC AUTO-ENTMCNC: 31.5 G/DL (ref 31.5–36.5)
MCV RBC AUTO: 90 FL (ref 78–100)
MONOCYTES # BLD AUTO: 0.9 10E3/UL (ref 0–1.3)
MONOCYTES NFR BLD AUTO: 13 %
NEUTROPHILS # BLD AUTO: 4.6 10E3/UL (ref 1.6–8.3)
NEUTROPHILS NFR BLD AUTO: 64 %
PLATELET # BLD AUTO: 254 10E3/UL (ref 150–450)
RBC # BLD AUTO: 3.97 10E6/UL (ref 3.8–5.2)
WBC # BLD AUTO: 7.2 10E3/UL (ref 4–11)

## 2022-06-15 PROCEDURE — 86140 C-REACTIVE PROTEIN: CPT

## 2022-06-15 PROCEDURE — 36415 COLL VENOUS BLD VENIPUNCTURE: CPT

## 2022-06-15 PROCEDURE — 99214 OFFICE O/P EST MOD 30 MIN: CPT | Performed by: INTERNAL MEDICINE

## 2022-06-15 PROCEDURE — 85025 COMPLETE CBC W/AUTO DIFF WBC: CPT

## 2022-06-15 NOTE — PROGRESS NOTES
[facility] Infectious Disease  Followup Visit        Assessment:    76 yo female with emphysema     1. Large Cavitary pneumonia. Abscess.   2. Rapid progression into cavitation in hospital while on Zosyn. Radiographic changes may not necessarily equate to antibiotic failure -- likely strep, anaerobes, sometimes gram negatives.   3. With elevated procalcitonin and significant leukocytosis and poor dentition this suggests polymicrobial bacterial infection  4.  concerning is progressive decline over many months and 40 pound weight loss which could signal either malignancy or chronic infection such as mycobacterial or fungal, with mediastinal lymphadenopathy visualized on CT chest     1. Fungal Assay--Aspergillus, Cryptococcal Antigen negative. Beta D Glucan negative. Additional fungal assays negative  Likely malnutrition.  Discussed.    Late sputum cx with only Candida  No bronch done  Creat stable    Received IV meropenem since April 15 until May 11 last visit when we switch her to Omnicef metronidazole           Plan:     Po Omnicef and metronidazole, to be completed today  CT chest in 3 weeks  Follow-up with pulmonary, to assess for resolution along with us and to assess for any need for bronchoscopy to exclude malignancy underlying this  Follow-up with primary doctor and monitor creatinine  Labs today  CT chest end of June  Since Omnicef dose might have to be adjusted       Isai Little M.D.        Present Illness:   This is 77 years old female with left-sided pneumonia lung abscess, with history of weight loss who was seen in the hospital and started on IV meropenem after Zosyn for suspected bacterial pneumonia.  She could not produce enough sputum for culture.  AFB stains were negative and culture remained negative.  All fungal serology came back negative  She has been at [facility].  She reports she is feeling better and she thinks she is close to baseline but her daughter does agree.  She is on home  oxygen.  Coughing is at baseline.  No chest pains.  No fever no chills   Does have loose stools  Repeat CT chest showed improvement in cavitary process but some new lesions in the right side however this is not correlating with any clinical deterioration  Denies dysphagia  Not a smoker    Radiology personally reviewed  Ct  IMPRESSION:   1.  Interval decrease in the amount of infiltrate and consolidation surrounding the cavitary area of consolidation posterior aspect left upper lobe. The cavity itself has decreased in size now measuring 1.1 x 2 cm with interval resolution of prior   internal fluid.     2.  Interval development of new hazy ill-defined consolidative alveolar infiltrate posterior right lower lobe compatible with pneumonia. New nodular infiltrates involving the inferior aspect of the lingular segment of the left upper lobe and anterior   lateral aspect left lower lobe with nodular areas of infiltrate measuring 1 cm or less. This would also be compatible with pneumonia.     3.  Advanced pulmonary emphysema.     4.  Remainder unchanged.    Review of Systems  Performed and all negative except as mentioned above.    Past medical, family, and social history reviewed, unchanged from before.        Physical Exam:     Gen. appearance nontoxic, on oxyegn  Eyes no conjunctivitis or icterus  Neck no stiffness   Heart  No edema  Lungs breathing comfortably    Extremities no synovitis, trace edema  Skin  no rash or emboli  Neurologic alert oriented no focal deficits      DATA   No results found for any visits on 06/15/22.      Isai Little MD, MD

## 2022-06-27 ENCOUNTER — ANCILLARY PROCEDURE (OUTPATIENT)
Dept: MAMMOGRAPHY | Facility: HOSPITAL | Age: 77
End: 2022-06-27
Attending: INTERNAL MEDICINE
Payer: MEDICARE

## 2022-06-27 DIAGNOSIS — Z12.31 SCREENING MAMMOGRAM, ENCOUNTER FOR: ICD-10-CM

## 2022-06-27 PROCEDURE — 77067 SCR MAMMO BI INCL CAD: CPT

## 2022-06-30 ENCOUNTER — ONCOLOGY VISIT (OUTPATIENT)
Dept: ONCOLOGY | Facility: CLINIC | Age: 77
End: 2022-06-30
Attending: INTERNAL MEDICINE
Payer: MEDICARE

## 2022-06-30 ENCOUNTER — OFFICE VISIT (OUTPATIENT)
Dept: INTERNAL MEDICINE | Facility: CLINIC | Age: 77
End: 2022-06-30
Payer: MEDICARE

## 2022-06-30 ENCOUNTER — INFUSION THERAPY VISIT (OUTPATIENT)
Dept: INFUSION THERAPY | Facility: CLINIC | Age: 77
End: 2022-06-30
Attending: INTERNAL MEDICINE
Payer: MEDICARE

## 2022-06-30 VITALS
WEIGHT: 99 LBS | BODY MASS INDEX: 19.96 KG/M2 | HEART RATE: 60 BPM | OXYGEN SATURATION: 93 % | DIASTOLIC BLOOD PRESSURE: 60 MMHG | HEIGHT: 59 IN | SYSTOLIC BLOOD PRESSURE: 98 MMHG

## 2022-06-30 VITALS
SYSTOLIC BLOOD PRESSURE: 139 MMHG | BODY MASS INDEX: 19.96 KG/M2 | HEART RATE: 59 BPM | HEIGHT: 59 IN | OXYGEN SATURATION: 93 % | DIASTOLIC BLOOD PRESSURE: 70 MMHG | WEIGHT: 99 LBS | RESPIRATION RATE: 16 BRPM

## 2022-06-30 VITALS
SYSTOLIC BLOOD PRESSURE: 139 MMHG | OXYGEN SATURATION: 93 % | DIASTOLIC BLOOD PRESSURE: 70 MMHG | HEART RATE: 59 BPM | RESPIRATION RATE: 18 BRPM | TEMPERATURE: 97.6 F

## 2022-06-30 DIAGNOSIS — G47.00 INSOMNIA, UNSPECIFIED TYPE: Primary | ICD-10-CM

## 2022-06-30 DIAGNOSIS — Z85.3 PERSONAL HISTORY OF MALIGNANT NEOPLASM OF BREAST: ICD-10-CM

## 2022-06-30 DIAGNOSIS — I50.22 CHRONIC SYSTOLIC CONGESTIVE HEART FAILURE (H): ICD-10-CM

## 2022-06-30 DIAGNOSIS — N18.30 CHRONIC RENAL INSUFFICIENCY, STAGE 3 (MODERATE) (H): ICD-10-CM

## 2022-06-30 DIAGNOSIS — J44.9 CHRONIC OBSTRUCTIVE PULMONARY DISEASE, UNSPECIFIED COPD TYPE (H): ICD-10-CM

## 2022-06-30 DIAGNOSIS — M54.50 CHRONIC BILATERAL LOW BACK PAIN WITHOUT SCIATICA: ICD-10-CM

## 2022-06-30 DIAGNOSIS — C50.912 INVASIVE DUCTAL CARCINOMA OF BREAST, FEMALE, LEFT (H): ICD-10-CM

## 2022-06-30 DIAGNOSIS — I48.20 CHRONIC ATRIAL FIBRILLATION (H): ICD-10-CM

## 2022-06-30 DIAGNOSIS — G89.29 CHRONIC BILATERAL LOW BACK PAIN WITHOUT SCIATICA: ICD-10-CM

## 2022-06-30 DIAGNOSIS — M81.0 SENILE OSTEOPOROSIS: Primary | ICD-10-CM

## 2022-06-30 PROCEDURE — G0463 HOSPITAL OUTPT CLINIC VISIT: HCPCS | Mod: 25

## 2022-06-30 PROCEDURE — 250N000011 HC RX IP 250 OP 636: Performed by: FAMILY MEDICINE

## 2022-06-30 PROCEDURE — 99214 OFFICE O/P EST MOD 30 MIN: CPT | Performed by: INTERNAL MEDICINE

## 2022-06-30 PROCEDURE — 96372 THER/PROPH/DIAG INJ SC/IM: CPT | Performed by: FAMILY MEDICINE

## 2022-06-30 RX ORDER — EPINEPHRINE 1 MG/ML
0.3 INJECTION, SOLUTION, CONCENTRATE INTRAVENOUS EVERY 5 MIN PRN
Status: CANCELLED | OUTPATIENT
Start: 2022-07-05

## 2022-06-30 RX ORDER — FUROSEMIDE 20 MG
20 TABLET ORAL DAILY
Qty: 90 TABLET | Refills: 1 | Status: ON HOLD | OUTPATIENT
Start: 2022-06-30 | End: 2023-01-01

## 2022-06-30 RX ORDER — MULTIPLE VITAMINS W/ MINERALS TAB 9MG-400MCG
1 TAB ORAL DAILY
COMMUNITY
End: 2023-01-01

## 2022-06-30 RX ORDER — NALOXONE HYDROCHLORIDE 0.4 MG/ML
0.2 INJECTION, SOLUTION INTRAMUSCULAR; INTRAVENOUS; SUBCUTANEOUS
Status: CANCELLED | OUTPATIENT
Start: 2022-07-05

## 2022-06-30 RX ORDER — METHYLPREDNISOLONE SODIUM SUCCINATE 125 MG/2ML
125 INJECTION, POWDER, LYOPHILIZED, FOR SOLUTION INTRAMUSCULAR; INTRAVENOUS
Status: CANCELLED
Start: 2022-07-05

## 2022-06-30 RX ORDER — ALBUTEROL SULFATE 90 UG/1
1-2 AEROSOL, METERED RESPIRATORY (INHALATION)
Status: CANCELLED
Start: 2022-07-05

## 2022-06-30 RX ORDER — MEPERIDINE HYDROCHLORIDE 50 MG/ML
25 INJECTION INTRAMUSCULAR; INTRAVENOUS; SUBCUTANEOUS EVERY 30 MIN PRN
Status: CANCELLED | OUTPATIENT
Start: 2022-07-05

## 2022-06-30 RX ORDER — ALBUTEROL SULFATE 0.83 MG/ML
2.5 SOLUTION RESPIRATORY (INHALATION)
Status: CANCELLED | OUTPATIENT
Start: 2022-07-05

## 2022-06-30 RX ORDER — DIPHENHYDRAMINE HYDROCHLORIDE 50 MG/ML
50 INJECTION INTRAMUSCULAR; INTRAVENOUS
Status: CANCELLED
Start: 2022-07-05

## 2022-06-30 RX ADMIN — ROMOSOZUMAB-AQQG 210 MG: 105 INJECTION, SOLUTION SUBCUTANEOUS at 13:23

## 2022-06-30 ASSESSMENT — PAIN SCALES - GENERAL: PAINLEVEL: SEVERE PAIN (7)

## 2022-06-30 NOTE — PROGRESS NOTES
[facility] Hematology and Oncology Progress Note    Patient: Irene León  MRN: [id number]  Date of Service: Jun 30, 2022         Reason for Visit    Chief Complaint   Patient presents with     Oncology Clinic Visit     Malignant neoplasm of central portion of left breast in female, estrogen receptor positive       Assessment and Plan    Cancer Staging  Invasive ductal carcinoma of breast, female, left (H)  Staging form: Breast, AJCC 8th Edition  - Pathologic stage from 6/20/2017: Stage IA (pT1b, pN0(sn), cM0, G1, ER+, AR+, HER2-, Oncotype DX score: 6) - Signed by Devon Suarez MD on 7/4/2022      ECOG Performance    2 - Ambulatory and independent in all ADLs; cannot work; up > 50% of the time     Pain  Pain Score: Severe Pain (7)  Pain Loc: Low Back (chronic)    #. Invasive ductal carcinoma of the left breast.   Stage IA (pT1b, pN0 (sn),cM0). grade 1, associated DCIS, ER/AR positive, HER-2 negative, s/p left breast lumpectomy and left axillary sentinel lymph node biopsy. Right breast atypical ductal hyperplasia s/p right breast excisional biopsy on 6/20/2017.  She has several comorbidities including nonischemic cardiomyopathy (EF 40% in 3/17), osteoporosis on treatment with oral bisphosphonate, factor V Leiden mutation (details unknown). Started Tamoxifen in 12/2017.   She does not have any clinical signs and symptoms suggestive of breast cancer recurrence.  Exam is unremarkable from breast cancer standpoint.   Her last mammogram from a few days ago was benign.   I discussed that she will complete 5 years of tamoxifen in December 2022.  Given her multiple medical problems going on, it seems okay to stop tamoxifen about 6 months early.  She feels that she does not have any side effects from tamoxifen.  She is feeling comfortable and determined to complete 5 years of adjuvant endocrine therapy.  I advised her that she can stop tamoxifen in December 2022.   Follow-up clinical exam with me in 1  year.   Continue annual screening mammogram and next due in 6/2023.    #.  Chronic hypoxic respiratory failure, on supplemental oxygen  #.  Large cavitary pneumonia/lung abscess     She has significant decline in her nutrition status, performance status attributed to her lung conditions.  I reviewed the extensive records in our system regarding her pulmonary disease, and lung infection.  She is starting adding on nutrition supplements, increasing activity as tolerated.  The concern was raised regarding malignancy.  She has a scheduled follow-up CT scan next week.     Follow-up with her pulmonary and infectious disease.    Encounter Diagnoses:    Problem List Items Addressed This Visit     Invasive ductal carcinoma of breast, female, left (H)             CC: Pankaj Montanez MD   ______________________________________________________________________________  Diagnosis  6/20/17  Stage IA (pT1b, pN0(sn), cM0) invasive ductal carcinoma of the left breast  - ER (high positive, 98%), DE (high positive, 97%) HER2 (negative, score of 1+ by IHC)  - [place] grade 1  - Tumor size: <1 cm  - Margin-negative on final margin with reexcision for invasive carcinoma but close margin for DCIS of 0.2 cm from new medial margin.    - 1 sentinel lymph node removed, negative for carcinoma  - associated DCIS  - Right breast atypical ductal hyperplasia.    - Oncotype DX recurrence score 6 : 10 year risk of recurrence with 5 year of tamoxifen is 5%.    Therapy to date:  6/20/17 -right breast excisional biopsy AND left breast lumpectomy, left axillary sentinel lymph node biopsy  6/30/17-reexcision lumpectomy of the left breast  9/8/17-completed adjuvant radiation to the left breast 4256 cGy/16  12/6/17-initiated adjuvant endocrine therapy with tamoxifen.    Comorbidities  #.  Mild hypoxia and chronic cough.   She is closely follow with pulmonologist at [facility].    #. Hypertension, controlled.  #.  Osteoporosis  #.  COPD  #.  Idiopathic  cardiomyopathy- LVEF 40% in October 2018, no change from prior.      History of Present Illness    Ms. Irene León presented today accompanied by her  for yearly follow-up.  She has significant decline in health from pulmonary standpoint.  She had pneumonia/lung abscess.  She has significant loss of weight from frequent infection and worsening lung function.  She continues tamoxifen daily.  She is determined to complete 5-year course of tamoxifen and she felt that she does not want to add another problem on her current illness.  She does not have headache.  She does not have persistent bone pain.      Review of systems  Apart from describing in HPI, the remainder of comprehensive ROS was negative.    Past History    Past Medical History:   Diagnosis Date     ANATOLIY (acute kidney injury) (H)      Allergic rhinitis      Arrhythmia      Atrial fibrillation with RVR (H)      Bacteremia      Breast cancer (H) 2017     Cardiomyopathy (H)      Centrilobular emphysema (H)      CHF (congestive heart failure) (H)      CKD (chronic kidney disease)      COPD (chronic obstructive pulmonary disease) (H)      Coronary artery disease      Depression      Dysphagia      E. coli sepsis (H)      Factor 5 Leiden mutation, heterozygous (H)      GERD (gastroesophageal reflux disease)      Hx of radiation therapy 2017     Hyperlipidemia      Hypertension      Hypokalemia      Hypomagnesemia      Idiopathic cardiomyopathy (H)      Left hip pain 4/30/2014     OAB (overactive bladder)      Osteoporosis      Sacral insufficiency fracture      Sinusitis      Syncope      Urge incontinence      Vocal cord dysfunction        Past Surgical History:   Procedure Laterality Date     ARTHROPLASTY REVISION HIP Left      BIOPSY BREAST Right 2017     BLADDER SURGERY      bladder interstim with removal     CARDIAC CATHETERIZATION       CATARACT EXTRACTION Left 07/18/2017     IR ABDOMINAL AORTOGRAM  4/16/2003     IR AORTIC ARCH 4 VESSEL ANGIOGRAM   "4/16/2003     IR MISCELLANEOUS PROCEDURE  4/16/2003     LUMPECTOMY BREAST Left 06/2017    x2     PICC DOUBLE LUMEN PLACEMENT  4/11/2022          PICC TRIPLE LUMEN PLACEMENT  4/7/2022          ZZC OPEN TX FEMORAL FRACTURE DISTAL MED/LAT CONDYLE Left 10/28/2015    Procedure: OPEN REDUCTION INTERNAL FIXATION LEFT  PROXIMAL FEMUR PERIPROSTHETIC FRACTURE;  Surgeon: Yovanny Albarran MD;  Location: [facility];  Service: Orthopedics       Physical Exam    /70   Pulse 59   Resp 16   Ht 1.499 m (4' 11\")   Wt 44.9 kg (99 lb)   SpO2 93%   BMI 20.00 kg/m      General: alert, awake, not in acute distress.  Very thin and cachectic.  She came in a wheelchair.  She wears 3 L oxygen by nasal cannula.  HEENT: Head: Normal, normocephalic, atraumatic.  Eye: Normal external eye, conjunctiva, lids cornea, CATINA.  Nose: Normal external nose, mucus membranes and septum.  Pharynx: Normal buccal mucosa. Normal pharynx.  Neck / Thyroid: Supple, no masses, nodes, nodules or enlargement.  Lymphatics: No abnormally enlarged lymph nodes.  Breasts: No palpable abnormalities  Abdomen: abdomen is soft without significant tenderness, masses, organomegaly or guarding  Extremities: normal strength, tone, and muscle mass  Skin: normal. no rash or abnormalities  CNS: non focal.    Lab Results    No results found for this or any previous visit (from the past 168 hour(s)).    Imaging    MA Screen Bilateral w/Rylan    Result Date: 6/27/2022  BILATERAL FULL FIELD DIGITAL SCREENING MAMMOGRAM WITH TOMOSYNTHESIS Performed on: 6/27/22 Compared to: 06/10/2021, 05/29/2020, and 05/13/2019 Technique:  This study was evaluated with the assistance of Computer-Aided Detection.  Breast Tomosynthesis was used in interpretation. Findings: The breasts are heterogeneously dense, which may obscure small masses.  There are breast conservation changes in the left breast., There are benign appearing calcifications in both breasts. There is no radiographic " evidence of malignancy. IMPRESSION: ACR BI-RADS Category 2: Benign RECOMMENDED FOLLOW-UP: Annual routine screening mammogram The results and recommendations of this examination will be communicated to the patient.       30 minutes spent on the date of the encounter doing chart review, history and exam, documentation and further activities as noted above.    Signed by: Devon Suarez MD

## 2022-06-30 NOTE — LETTER
"    6/30/2022         RE: Irene León  [address] [facility] [address]        Dear Colleague,    Thank you for referring your patient, Irene León, to the [facility]. Please see a copy of my visit note below.    Oncology Rooming Note    June 30, 2022 2:15 PM   Irene León is a 77 year old female who presents for:    Chief Complaint   Patient presents with     Oncology Clinic Visit     Malignant neoplasm of central portion of left breast in female, estrogen receptor positive     Initial Vitals: /70   Pulse 59   Resp 16   Ht 1.499 m (4' 11\")   Wt 44.9 kg (99 lb)   SpO2 93%   BMI 20.00 kg/m   Estimated body mass index is 20 kg/m  as calculated from the following:    Height as of this encounter: 1.499 m (4' 11\").    Weight as of this encounter: 44.9 kg (99 lb). Body surface area is 1.37 meters squared.  Severe Pain (7) Comment: Data Unavailable   No LMP recorded. Patient is postmenopausal.  Allergies reviewed: Yes  Medications reviewed: Yes    Medications: Medication refills not needed today.  Pharmacy name entered into [employer]:    [id number] [place], [place] - [facility]  AT [facility] - [place], [place] - [address] [place] [address] [address] [facility]    Clinical concerns: yearly follow up    Lorrie Chiu              [facility] Hematology and Oncology Progress Note    Patient: Irene León  MRN: [id number]  Date of Service: Jun 30, 2022         Reason for Visit    Chief Complaint   Patient presents with     Oncology Clinic Visit     Malignant neoplasm of central portion of left breast in female, estrogen receptor positive       Assessment and Plan    Cancer Staging  Invasive ductal carcinoma of breast, female, left (H)  Staging form: Breast, AJCC 8th Edition  - Pathologic stage from 6/20/2017: Stage IA (pT1b, pN0(sn), cM0, G1, ER+, OK+, " HER2-, Oncotype DX score: 6) - Signed by Devon Suarez MD on 7/4/2022      ECOG Performance    2 - Ambulatory and independent in all ADLs; cannot work; up > 50% of the time     Pain  Pain Score: Severe Pain (7)  Pain Loc: Low Back (chronic)    #. Invasive ductal carcinoma of the left breast.   Stage IA (pT1b, pN0 (sn),cM0). grade 1, associated DCIS, ER/[place] positive, HER-2 negative, s/p left breast lumpectomy and left axillary sentinel lymph node biopsy. Right breast atypical ductal hyperplasia s/p right breast excisional biopsy on 6/20/2017.  She has several comorbidities including nonischemic cardiomyopathy (EF 40% in 3/17), osteoporosis on treatment with oral bisphosphonate, factor V Leiden mutation (details unknown). Started Tamoxifen in 12/2017.   She does not have any clinical signs and symptoms suggestive of breast cancer recurrence.  Exam is unremarkable from breast cancer standpoint.   Her last mammogram from a few days ago was benign.   I discussed that she will complete 5 years of tamoxifen in December 2022.  Given her multiple medical problems going on, it seems okay to stop tamoxifen about 6 months early.  She feels that she does not have any side effects from tamoxifen.  She is feeling comfortable and determined to complete 5 years of adjuvant endocrine therapy.  I advised her that she can stop tamoxifen in December 2022.   Follow-up clinical exam with me in 1 year.   Continue annual screening mammogram and next due in 6/2023.    #.  Chronic hypoxic respiratory failure, on supplemental oxygen  #.  Large cavitary pneumonia/lung abscess     She has significant decline in her nutrition status, performance status attributed to her lung conditions.  I reviewed the extensive records in our system regarding her pulmonary disease, and lung infection.  She is starting adding on nutrition supplements, increasing activity as tolerated.  The concern was raised regarding malignancy.  She has a scheduled follow-up  CT scan next week.     Follow-up with her pulmonary and infectious disease.    Encounter Diagnoses:    Problem List Items Addressed This Visit     Invasive ductal carcinoma of breast, female, left (H)             CC: Pankaj Montanez MD   ______________________________________________________________________________  Diagnosis  6/20/17  Stage IA (pT1b, pN0(sn), cM0) invasive ductal carcinoma of the left breast  - ER (high positive, 98%), [place] (high positive, 97%) HER2 (negative, score of 1+ by IHC)  - [place] grade 1  - Tumor size: <1 cm  - Margin-negative on final margin with reexcision for invasive carcinoma but close margin for DCIS of 0.2 cm from new medial margin.    - 1 sentinel lymph node removed, negative for carcinoma  - associated DCIS  - Right breast atypical ductal hyperplasia.    - Oncotype DX recurrence score 6 : 10 year risk of recurrence with 5 year of tamoxifen is 5%.    Therapy to date:  6/20/17 -right breast excisional biopsy AND left breast lumpectomy, left axillary sentinel lymph node biopsy  6/30/17-reexcision lumpectomy of the left breast  9/8/17-completed adjuvant radiation to the left breast 4256 cGy/16  12/6/17-initiated adjuvant endocrine therapy with tamoxifen.    Comorbidities  #.  Mild hypoxia and chronic cough.   She is closely follow with pulmonologist at [facility].    #. Hypertension, controlled.  #.  Osteoporosis  #.  COPD  #.  Idiopathic cardiomyopathy- LVEF 40% in October 2018, no change from prior.      History of Present Illness    Ms. Irene León presented today accompanied by her  for yearly follow-up.  She has significant decline in health from pulmonary standpoint.  She had pneumonia/lung abscess.  She has significant loss of weight from frequent infection and worsening lung function.  She continues tamoxifen daily.  She is determined to complete 5-year course of tamoxifen and she felt that she does not want to add another problem on her current illness.  She does not  "have headache.  She does not have persistent bone pain.      Review of systems  Apart from describing in HPI, the remainder of comprehensive ROS was negative.    Past History    Past Medical History:   Diagnosis Date     ANATOLIY (acute kidney injury) (H)      Allergic rhinitis      Arrhythmia      Atrial fibrillation with RVR (H)      Bacteremia      Breast cancer (H) 2017     Cardiomyopathy (H)      Centrilobular emphysema (H)      CHF (congestive heart failure) (H)      CKD (chronic kidney disease)      COPD (chronic obstructive pulmonary disease) (H)      Coronary artery disease      Depression      Dysphagia      E. coli sepsis (H)      Factor 5 Leiden mutation, heterozygous (H)      GERD (gastroesophageal reflux disease)      Hx of radiation therapy 2017     Hyperlipidemia      Hypertension      Hypokalemia      Hypomagnesemia      Idiopathic cardiomyopathy (H)      Left hip pain 4/30/2014     OAB (overactive bladder)      Osteoporosis      Sacral insufficiency fracture      Sinusitis      Syncope      Urge incontinence      Vocal cord dysfunction        Past Surgical History:   Procedure Laterality Date     ARTHROPLASTY REVISION HIP Left      BIOPSY BREAST Right 2017     BLADDER SURGERY      bladder interstim with removal     CARDIAC CATHETERIZATION       CATARACT EXTRACTION Left 07/18/2017     IR ABDOMINAL AORTOGRAM  4/16/2003     IR AORTIC ARCH 4 VESSEL ANGIOGRAM  4/16/2003     IR MISCELLANEOUS PROCEDURE  4/16/2003     LUMPECTOMY BREAST Left 06/2017    x2     PICC DOUBLE LUMEN PLACEMENT  4/11/2022          PICC TRIPLE LUMEN PLACEMENT  4/7/2022          ZZC OPEN TX FEMORAL FRACTURE DISTAL MED/LAT CONDYLE Left 10/28/2015    Procedure: OPEN REDUCTION INTERNAL FIXATION LEFT  PROXIMAL FEMUR PERIPROSTHETIC FRACTURE;  Surgeon: Yovanny Albarran MD;  Location: [facility];  Service: Orthopedics       Physical Exam    /70   Pulse 59   Resp 16   Ht 1.499 m (4' 11\")   Wt 44.9 kg (99 lb)   SpO2 93%   BMI " 20.00 kg/m      General: alert, awake, not in acute distress.  Very thin and cachectic.  She came in a wheelchair.  She wears 3 L oxygen by nasal cannula.  HEENT: Head: Normal, normocephalic, atraumatic.  Eye: Normal external eye, conjunctiva, lids cornea, ACTINA.  Nose: Normal external nose, mucus membranes and septum.  Pharynx: Normal buccal mucosa. Normal pharynx.  Neck / Thyroid: Supple, no masses, nodes, nodules or enlargement.  Lymphatics: No abnormally enlarged lymph nodes.  Breasts: No palpable abnormalities  Abdomen: abdomen is soft without significant tenderness, masses, organomegaly or guarding  Extremities: normal strength, tone, and muscle mass  Skin: normal. no rash or abnormalities  CNS: non focal.    Lab Results    No results found for this or any previous visit (from the past 168 hour(s)).    Imaging    MA Screen Bilateral w/Rylan    Result Date: 6/27/2022  BILATERAL FULL FIELD DIGITAL SCREENING MAMMOGRAM WITH TOMOSYNTHESIS Performed on: 6/27/22 Compared to: 06/10/2021, 05/29/2020, and 05/13/2019 Technique:  This study was evaluated with the assistance of Computer-Aided Detection.  Breast Tomosynthesis was used in interpretation. Findings: The breasts are heterogeneously dense, which may obscure small masses.  There are breast conservation changes in the left breast., There are benign appearing calcifications in both breasts. There is no radiographic evidence of malignancy. IMPRESSION: ACR BI-RADS Category 2: Benign RECOMMENDED FOLLOW-UP: Annual routine screening mammogram The results and recommendations of this examination will be communicated to the patient.       30 minutes spent on the date of the encounter doing chart review, history and exam, documentation and further activities as noted above.    Signed by: Devon Suarez MD           Again, thank you for allowing me to participate in the care of your patient.        Sincerely,        Devon Suarez MD

## 2022-06-30 NOTE — PROGRESS NOTES
"Oncology Rooming Note    June 30, 2022 2:15 PM   Irene León is a 77 year old female who presents for:    Chief Complaint   Patient presents with     Oncology Clinic Visit     Malignant neoplasm of central portion of left breast in female, estrogen receptor positive     Initial Vitals: /70   Pulse 59   Resp 16   Ht 1.499 m (4' 11\")   Wt 44.9 kg (99 lb)   SpO2 93%   BMI 20.00 kg/m   Estimated body mass index is 20 kg/m  as calculated from the following:    Height as of this encounter: 1.499 m (4' 11\").    Weight as of this encounter: 44.9 kg (99 lb). Body surface area is 1.37 meters squared.  Severe Pain (7) Comment: Data Unavailable   No LMP recorded. Patient is postmenopausal.  Allergies reviewed: Yes  Medications reviewed: Yes    Medications: Medication refills not needed today.  Pharmacy name entered into [facility]:    [facility] #[id number] [place], [place] - [address] [facility]  AT [facility]  [facility] - [facility], [place] - [address] [place] [address] - [place], [place] - [address] [facility]    Clinical concerns: yearly follow up    Lorrie Chiu            "

## 2022-06-30 NOTE — PROGRESS NOTES
[facility] clinic Follow Up Note    Irene León   77 year old female    Date of Visit: 6/30/2022    Chief Complaint   Patient presents with     COPD     Subjective  77-year-old female here with  here for follow-up of her complex medical issues.    She has severe COPD, quit smoking over 14 years ago.  Oxygen dependent.  Not on chronic steroids.  She is using her new nebulizer machine and inhalers.    She has cavitary pneumonia and poor dentition with recurrent bronchitis and pneumonia earlier this year.  She just finished 30 days of Omnicef and Flagyl on Letty 15.    AFB and fungal serologies were negative.  She did not want a bronc.    She has a chest CT scan scheduled for tomorrow.  Her June 1 chest CT scan did show some decrease of the cavitary areas but some new posterior and lower lobe infiltrate at that time.    She continues to have cough and mucus production but no fevers or worsening shortness of breath.    Earlier this month she had a normal white count and hemoglobin level improving to 11.2.  Creatinine level was stable at 1.4.    She has chronic renal insufficiency since ATN with sepsis March 2021.    She has had chronic systolic congestive heart failure.  April 2022 heart echo with ejection fraction 35-40% with global hypokinesis, just mild mitral regurgitation and no aortic stenosis.  Previous heart echo last year showed mild pulmonary hypertension.    Her extremity edema is down to just trace levels now her blood pressure is somewhat lower and she feels mildly lightheaded when standing up and complains of dry mouth.    She is currently on 40 mg a day furosemide.  She is not on other blood pressure medication, other than metoprolol 75 mg a day for atrial fibrillation..    She has chronic atrial fibrillation and history of DVT, on Xarelto.    Mild coronary disease seen on angiogram in the past but negative stress test in 2016 and no new chest pain.  History of occipital stroke with history of  high-grade intracranial stenosis noted.  Still on simvastatin 40 mg.    She complains of chronic insomnia and anxiety.  She has used trazodone in the past and she was asking for that today.    But she has a history of long QTc, and she still uses tramadol up to twice a day for chronic back pain.  Also gabapentin at night.  Most recent EKG on April 7, 2022 with a QTC of 488.    She does use intermittent Benadryl for itchy dry skin.    Past history of stage I breast cancer with lumpectomy and radiation in 2017, she sees oncology later today and plans to stop tamoxifen after 5-year course.    History of C. difficile colitis earlier this spring with her acute illness.  She was treated with oral vancomycin.  Her diarrhea has resolved, she is now complaining of some intermittent constipation.    She has a poor appetite with her chronic lung disease.  Her weight is down 7 pounds from earlier this month.    PMHx:    Past Medical History:   Diagnosis Date     ANATOLIY (acute kidney injury) (H)      Allergic rhinitis      Arrhythmia      Atrial fibrillation with RVR (H)      Bacteremia      Breast cancer (H) 2017     Cardiomyopathy (H)      Centrilobular emphysema (H)      CHF (congestive heart failure) (H)      CKD (chronic kidney disease)      COPD (chronic obstructive pulmonary disease) (H)      Coronary artery disease      Depression      Dysphagia      E. coli sepsis (H)      Factor 5 Leiden mutation, heterozygous (H)      GERD (gastroesophageal reflux disease)      Hx of radiation therapy 2017     Hyperlipidemia      Hypertension      Hypokalemia      Hypomagnesemia      Idiopathic cardiomyopathy (H)      Left hip pain 4/30/2014     OAB (overactive bladder)      Osteoporosis      Sacral insufficiency fracture      Sinusitis      Syncope      Urge incontinence      Vocal cord dysfunction      PSHx:    Past Surgical History:   Procedure Laterality Date     ARTHROPLASTY REVISION HIP Left      BIOPSY BREAST Right 2017      BLADDER SURGERY      bladder interstim with removal     CARDIAC CATHETERIZATION       CATARACT EXTRACTION Left 07/18/2017     IR ABDOMINAL AORTOGRAM  4/16/2003     IR AORTIC ARCH 4 VESSEL ANGIOGRAM  4/16/2003     IR MISCELLANEOUS PROCEDURE  4/16/2003     LUMPECTOMY BREAST Left 06/2017    x2     PICC DOUBLE LUMEN PLACEMENT  4/11/2022          PICC TRIPLE LUMEN PLACEMENT  4/7/2022          ZZC OPEN TX FEMORAL FRACTURE DISTAL MED/LAT CONDYLE Left 10/28/2015    Procedure: OPEN REDUCTION INTERNAL FIXATION LEFT  PROXIMAL FEMUR PERIPROSTHETIC FRACTURE;  Surgeon: Yovanny Albarran MD;  Location: [facility];  Service: Orthopedics     Immunizations:   Immunization History   Administered Date(s) Administered     COVID-19,PF,Pfizer (12+ Yrs) 03/02/2021, 03/23/2021, 12/08/2021     FLU 6-35 months 11/03/2003     Flu 65+ Years 09/20/2017, 09/25/2018, 10/10/2019     Flu, Unspecified 10/01/2016, 10/01/2018     [id number] Novel Flu 01/05/2010     Influenza (H1N1) 01/05/2010     Influenza (High Dose) 3 valent vaccine 10/30/2014, 10/01/2015, 11/26/2016, 09/25/2018, 10/10/2019, 10/19/2020     Influenza (IIV3) PF 11/03/2003, 11/08/2006, 10/24/2007, 11/28/2008, 10/09/2009, 10/22/2010, 01/26/2012, 10/03/2012, 11/01/2013     Influenza, Quad, High Dose, Pf, 65yr+ (Fluzone HD) 10/19/2020, 12/15/2020     Mantoux Tuberculin Skin Test 12/13/2007, 10/24/2020, 11/03/2020     Pneumo Conj 13-V (2010&after) 05/11/2015     Pneumococcal 23 valent 11/24/1997, 11/21/2007, 05/17/2017     TD (ADULT, 7+) 07/20/2004     TDAP Vaccine (Boostrix) 02/12/2016     Tdap (Adacel,Boostrix) 02/12/2016     Zoster vaccine recombinant adjuvanted (SHINGRIX) 07/31/2020     Zoster vaccine, live 02/18/2009, 10/20/2014       ROS A comprehensive review of systems was performed and was otherwise negative    Medications, allergies, and problem list were reviewed and updated    Exam  BP 98/60 (BP Location: Right arm, Patient Position: Sitting, Cuff Size: Adult Small)   Pulse  "60   Ht 1.499 m (4' 11\")   Wt 44.9 kg (99 lb)   SpO2 93%   BMI 20.00 kg/m    Frail elderly female with severe kyphosis.  She was able to climb up on exam table today with assistance.  Decreased breath sounds throughout with reduced respiratory excursion and some moderate rhonchi specially on the right.  Heart is irregularly irregular I did not hear murmur.  There is just trace ankle edema.  Abdomen soft nontender, thin.    Assessment/Plan  1. Insomnia, unspecified type  Chronic, chronic anxiety.  I did discuss trazodone with patient.  But with her history of long QTc, and still using tramadol, I recommended she not use trazodone because of the cardiac arrhythmia risk.  I recommended against any benzodiazepines with her chronic hypoxia and kyphosis and risk of fall or confusion with benzodiazepines.    She uses melatonin without much benefit.    I would anticipate sleep will continue to be an issue for her but I would not recommend additional medications    2. Chronic obstructive pulmonary disease, unspecified COPD type (H)  Severe, currently stable.  Recurrent cavitary pneumonia and bronchitis.  Repeat chest CT scan today and follow-up with the infectious disease clinic on July 13.  She finished antibiotics on Letty 15.    Continue nebulizers and inhalers.  Continue oxygen supplementation.    She is DNR    3. Chronic atrial fibrillation (H)  Rate controlled with metoprolol.  On Xarelto.    4. Personal history of malignant neoplasm of breast  No evidence of recurrence.  Sees oncology today.  Anticipating stopping tamoxifen today after 5-year course    5. Chronic bilateral low back pain without sciatica  Chronic back DJD with deconditioning.  Moderately severe kyphosis.  She has been doing physical therapy and has improved her mobility.    She still wishes to use tramadol despite her history of long QTc.    Gabapentin in the evening    6. Chronic renal insufficiency, stage 3 (moderate) (H)  Creatinine was stable at " 1.4 earlier this month.    7. Chronic systolic congestive heart failure (H)  Reports from edema has reduced with lower weight, complaining of some dry mouth and lightheadedness.  She does appear to be on the dry side at this time.  Reduce furosemide down to 20 mg a day.  She was warned about risk of increased edema or CHF exacerbation.  If increasing edema or shortness of breath, go back to 40 mg a day furosemide.    Follow-up in 6 weeks for reevaluation.  - furosemide (LASIX) 20 MG tablet; Take 1 tablet (20 mg) by mouth daily  Dispense: 90 tablet; Refill: 1    Decreased ejection fraction in April with acute illness.  Could consider repeat heart echo, otherwise plan to follow clinically.    History of C. difficile colitis, resolved.  Now with borderline constipation.  She plans to use Metamucil in the evening.    Coronary disease has been asymptomatic.  History of intracranial artery disease.  On simvastatin 40 mg      Return in about 6 weeks (around 8/11/2022) for Follow up.   Patient Instructions   Reduce furosemide down to 20 mg a day, as your blood pressure is on the low side and your leg swelling is down.  If you have increasing leg swelling or shortness of breath, go back to 40 mg a day furosemide.    Follow-up in clinic again in approximately 6 weeks for a checkup on your leg swelling and heart condition.    Unfortunately sleeping medications for you would cause significant risk of affecting your breathing or causing a heart arrhythmia.  Therefore I would not recommend trazodone.    You can use Metamucil or MiraLAX daily for constipation if needed.    Proceed with chest CT scan and follow-up with infectious disease for your lung condition next month, as planned.    Pankaj Montanez MD, MD        Current Outpatient Medications   Medication Sig Dispense Refill     acetaminophen (TYLENOL) 500 MG tablet Take 1,000 mg by mouth every 8 hours as needed for mild pain or fever        albuterol (PROVENTIL) (2.5 MG/3ML)  0.083% neb solution Take 1 vial (2.5 mg) by nebulization every 2 hours as needed for shortness of breath / dyspnea 90 mL 0     cetirizine (ZYRTEC) 10 MG tablet Take 10 mg by mouth daily as needed for allergies       chlorhexidine (PERIDEX) 0.12 % solution [CHLORHEXIDINE (PERIDEX) 0.12 % SOLUTION] Apply 15 mL to the mouth or throat at bedtime as needed.        EPINEPHrine (EPIPEN/ADRENACLICK/AUVI-Q) 0.3 mg/0.3 mL injection Inject 0.3 mg into the muscle as needed for anaphylaxis        famotidine (PEPCID) 20 MG tablet Take 1 tablet (20 mg) by mouth At Bedtime 30 tablet 0     fluticasone (FLONASE) 50 MCG/ACT nasal spray Spray 1 spray into both nostrils daily 15.8 mL 0     Fluticasone-Umeclidin-Vilanterol (TRELEGY ELLIPTA) 200-62.5-25 MCG/INH oral inhaler Inhale 1 puff into the lungs At Bedtime       furosemide (LASIX) 20 MG tablet Take 1 tablet (20 mg) by mouth daily 90 tablet 1     gabapentin (NEURONTIN) 100 MG capsule Take 2 capsules (200 mg) by mouth every morning 60 capsule 0     gabapentin (NEURONTIN) 300 MG capsule Take 1 capsule (300 mg) by mouth At Bedtime 30 capsule 0     guaiFENesin (MUCINEX) 600 MG 12 hr tablet Take 2 tablets (1,200 mg) by mouth 2 times daily 60 tablet 0     ipratropium (ATROVENT HFA) 17 MCG/ACT inhaler [ATROVENT HFA 17 MCG/ACTUATION INHALER] USE 2 INHALATIONS EVERY 6 HOURS 77.4 g 3     ipratropium - albuterol 0.5 mg/2.5 mg/3 mL (DUONEB) 0.5-2.5 (3) MG/3ML neb solution Take 1 vial (3 mLs) by nebulization 4 times daily 90 mL 4     melatonin 5 MG tablet Take 1 tablet (5 mg) by mouth At Bedtime 30 tablet 0     metoprolol succinate ER (TOPROL-XL) 25 MG 24 hr tablet Take 3 tablets (75 mg) by mouth At Bedtime 90 tablet 0     multivitamin w/minerals (MULTI-VITAMIN) tablet Take 1 tablet by mouth daily       rivaroxaban ANTICOAGULANT (XARELTO) 15 MG TABS tablet Take 1 tablet (15 mg) by mouth At Bedtime 90 tablet 3     simvastatin (ZOCOR) 40 MG tablet Take 1 tablet (40 mg) by mouth At Bedtime 90  tablet 3     tamoxifen (NOLVADEX) 20 MG tablet TAKE 1 TABLET DAILY 90 tablet 3     traMADol (ULTRAM) 50 MG tablet Take 1 tablet (50 mg) by mouth 2 times daily 60 tablet 0     Allergies   Allergen Reactions     Sulfa (Sulfonamide Antibiotics) [Sulfa Drugs] Rash     Headaches and dizziness.     Homeopathic Drugs [External Allergen Needs Reconciliation - See Comment] Unknown and Other (See Comments)     Comment: runny nose, Comment: runny nose     Mold Extracts [Molds & Smuts] Unknown     Morphine (Pf) [Morphine] Unknown     hallucinate     Lisinopril Cough, Unknown and Itching     Comment: cough, Comment: cough     Sulfacetamide Sodium [Sulfacetamide] Rash     Social History     Tobacco Use     Smoking status: Former Smoker     Quit date: 10/28/2007     Years since quittin.6     Smokeless tobacco: Former User     Quit date: 2004   Substance Use Topics     Alcohol use: No     Drug use: No             Subjective   Stephanie is a 77 year old, presenting for the following health issues:  COPD      HPI           Review of Systems         Objective    There were no vitals taken for this visit.  There is no height or weight on file to calculate BMI.  Physical Exam                       .  ..

## 2022-06-30 NOTE — PATIENT INSTRUCTIONS
Reduce furosemide down to 20 mg a day, as your blood pressure is on the low side and your leg swelling is down.  If you have increasing leg swelling or shortness of breath, go back to 40 mg a day furosemide.    Follow-up in clinic again in approximately 6 weeks for a checkup on your leg swelling and heart condition.    Unfortunately sleeping medications for you would cause significant risk of affecting your breathing or causing a heart arrhythmia.  Therefore I would not recommend trazodone.    You can use Metamucil or MiraLAX daily for constipation if needed.    Proceed with chest CT scan and follow-up with infectious disease for your lung condition next month, as planned.

## 2022-06-30 NOTE — PROGRESS NOTES
Stephanie was here today for injections x 2.  1 to back of right arm and 2nd to back of left arm.  Pt tolerated injections well and was discharged.          Yamini Booker CMA

## 2022-07-04 PROBLEM — C50.912 INVASIVE DUCTAL CARCINOMA OF BREAST, FEMALE, LEFT (H): Status: ACTIVE | Noted: 2022-07-04

## 2022-07-06 ENCOUNTER — HOSPITAL ENCOUNTER (OUTPATIENT)
Dept: CT IMAGING | Facility: CLINIC | Age: 77
Discharge: HOME | End: 2022-07-06
Attending: INTERNAL MEDICINE | Admitting: INTERNAL MEDICINE
Payer: MEDICARE

## 2022-07-06 DIAGNOSIS — J85.1 ABSCESS OF LUNG WITH PNEUMONIA, UNSPECIFIED LATERALITY (H): ICD-10-CM

## 2022-07-06 PROCEDURE — 71250 CT THORAX DX C-: CPT

## 2022-07-11 ENCOUNTER — TELEPHONE (OUTPATIENT)
Dept: RADIATION ONCOLOGY | Facility: HOSPITAL | Age: 77
End: 2022-07-11

## 2022-07-11 ENCOUNTER — TELEPHONE (OUTPATIENT)
Dept: INFECTIOUS DISEASES | Facility: CLINIC | Age: 77
End: 2022-07-11

## 2022-07-11 DIAGNOSIS — J43.9 PULMONARY EMPHYSEMA, UNSPECIFIED EMPHYSEMA TYPE (H): ICD-10-CM

## 2022-07-11 DIAGNOSIS — J44.1 COPD EXACERBATION (H): ICD-10-CM

## 2022-07-11 RX ORDER — TRAMADOL HYDROCHLORIDE 50 MG/1
50 TABLET ORAL 2 TIMES DAILY
Qty: 60 TABLET | Refills: 0 | Status: SHIPPED | OUTPATIENT
Start: 2022-07-11 | End: 2022-08-30

## 2022-07-11 NOTE — TELEPHONE ENCOUNTER
"Pt calling for her CT results, the pt states that the MD told her she did not have to come in if the CT looked \"ok\". I informed her that the R lung shows worsening and advise she keep her appt on 7/13, pt understands.  "

## 2022-07-11 NOTE — TELEPHONE ENCOUNTER
Patient called. She is out of Tramadol and needs a refill asap.     [facility] in [place]

## 2022-07-11 NOTE — TELEPHONE ENCOUNTER
Received telephone call from patient requesting refill of tramadol.     Last refill: 5/19/22  Last office visit: 5/19/22  Scheduled for follow up 7/14/22     Will route request to APRN for review.     Reviewed [place]  Report.

## 2022-07-13 ENCOUNTER — TELEPHONE (OUTPATIENT)
Dept: RESPIRATORY THERAPY | Facility: HOSPITAL | Age: 77
End: 2022-07-13

## 2022-07-13 ENCOUNTER — OFFICE VISIT (OUTPATIENT)
Dept: INFECTIOUS DISEASES | Facility: CLINIC | Age: 77
End: 2022-07-13
Payer: MEDICARE

## 2022-07-13 VITALS
BODY MASS INDEX: 20.12 KG/M2 | SYSTOLIC BLOOD PRESSURE: 120 MMHG | TEMPERATURE: 97.7 F | HEART RATE: 76 BPM | DIASTOLIC BLOOD PRESSURE: 72 MMHG | WEIGHT: 99.6 LBS

## 2022-07-13 DIAGNOSIS — J85.1 ABSCESS OF LUNG WITH PNEUMONIA, UNSPECIFIED LATERALITY (H): Primary | ICD-10-CM

## 2022-07-13 PROCEDURE — 99214 OFFICE O/P EST MOD 30 MIN: CPT | Performed by: INTERNAL MEDICINE

## 2022-07-13 NOTE — PROGRESS NOTES
[facility] Infectious Disease  Followup Visit        Assessment:    78 yo female with emphysema     1. Large Cavitary pneumonia. Abscess.  Definitely better on antibiotics that were completed last visit  2. However some new infiltrates on the right side that appears stable without clinical correlations.  Normal CRP unclear if this is noninfectious phenomenal such as   3. Rapid progression into cavitation in hospital while on Zosyn. Radiographic changes may not necessarily equate to antibiotic failure -- likely strep, anaerobes, sometimes gram negatives.   4. With elevated procalcitonin and significant leukocytosis and poor dentition this suggests polymicrobial bacterial infection  5.  concerning is progressive decline over many months and 40 pound weight loss which could signal either malignancy or chronic infection such as mycobacterial or fungal, with mediastinal lymphadenopathy visualized on CT chest     1. Fungal Assay--Aspergillus, Cryptococcal Antigen negative. Beta D Glucan negative. Additional fungal assays negative  Likely malnutrition.  Discussed.     Late sputum cx with only Candida  No bronch done  Creat stable     Received IV meropenem since April 15 until May 11 last visit when we switch her to Omnicef metronidazole         Plan:     Okay stay off antibiotics  Follow-up with pulmonary this week  Pulmonary will follow on the right side infiltrate, would likely need repeat CT  Etc.      Isai Little M.D.      Present Illness:   This is 77 years old female with left-sided pneumonia lung abscess, with history of weight loss who was seen in the hospital and started on IV meropenem after Zosyn for suspected bacterial pneumonia.  She could not produce enough sputum for culture.  AFB stains were negative and culture remained negative.  All fungal serology came back negative  She has been at [facility].  She reports she is feeling better and she thinks she is close to baseline but her daughter does  agree.  She is on home oxygen.  Coughing is at baseline.  No chest pains.  No fever no chills   Does have loose stools  Repeat CT chest showed improvement in cavitary process but some new lesions in the right side however this is not correlating with any clinical deterioration  Denies dysphagia  Not a smoker       No relapse of symptoms since last time we saw her  CT is reviewed is much better on the left side.  Still right basilar infiltrates of unclear significance and clinical correlation  She does not think she is aspirating.  CRP at the time of last visit was completely normal      Review of Systems  Performed and all negative except as mentioned above.    Past medical, family, and social history reviewed, unchanged from before.        Physical Exam:   Gen. appearance nontoxic, on oxygen, looks a little tired  Eyes no conjunctivitis or icterus  Neck no stiffness   Heart  No edema  Lungs breathing comfortably     Extremities no synovitis, trace edema  Skin  no rash or emboli  Neurologic alert oriented no focal deficits       DATA   No results found for any visits on 07/13/22.      Isai Little MD, MD

## 2022-07-13 NOTE — TELEPHONE ENCOUNTER
Spoke with Stephanie. She has been having problems with her insurance paying for her oxygen but has an appointment for an O2 assessment that should requalifer and resolve that issue and she states that she is in no danger of running out of an oxygen supply prior to her appointment.Reviewed COPD Action Plan.    Sharon Ryan, RT, TTS, Chronic Pulmonary Disease Specialist

## 2022-07-14 ENCOUNTER — VIRTUAL VISIT (OUTPATIENT)
Dept: RADIATION ONCOLOGY | Facility: CLINIC | Age: 77
End: 2022-07-14
Attending: INTERNAL MEDICINE
Payer: MEDICARE

## 2022-07-14 ENCOUNTER — TELEPHONE (OUTPATIENT)
Dept: RADIATION ONCOLOGY | Facility: HOSPITAL | Age: 77
End: 2022-07-14

## 2022-07-14 DIAGNOSIS — Z51.5 ENCOUNTER FOR PALLIATIVE CARE: ICD-10-CM

## 2022-07-14 DIAGNOSIS — R63.0 ANOREXIA: ICD-10-CM

## 2022-07-14 DIAGNOSIS — M62.81 GENERALIZED MUSCLE WEAKNESS: ICD-10-CM

## 2022-07-14 DIAGNOSIS — R06.02 SOB (SHORTNESS OF BREATH): Primary | ICD-10-CM

## 2022-07-14 DIAGNOSIS — G89.4 CHRONIC PAIN SYNDROME: ICD-10-CM

## 2022-07-14 PROCEDURE — 99215 OFFICE O/P EST HI 40 MIN: CPT | Mod: 95 | Performed by: CLINICAL NURSE SPECIALIST

## 2022-07-14 NOTE — NURSING NOTE
"[facility]  Palliative Care Daily Progress Note    \"This video visit will be conducted via a call between you and your physician/provider. We have found that certain health care needs can be provided without the need for an in-person physical exam.  This service lets us provide the care you need with a video conversation.  If a prescription is necessary, we can send it directly to your pharmacy.  If lab work is needed, we can place an order for that and you can then stop by our lab to have the test done at a later time.     Video visits are billed at different rates depending on your insurance coverage. Please reach out to your insurance provider with any questions.     If during the course of the call the physician/provider feels a video visit is not appropriate, you will not be charged for this service.\"     Patient has given verbal consent to a Video visit? yes     Video Start - End Time:  1022 - 3057        Code status: DNR/I     Impressions, Recommendations & Counseling     SYMPTOM ASSESSMENT:  1.  Shortness of breath secondary to advanced COPD, previous lung abscess foe which treated with IV antibiotics and follows with infectious disease and pulmonology  Significant activity intolerance as becomes short of breath with minimal exertion in the home.  -Oxygen 3 L nasal cannula  -Continue with nebulizers, inhalers, and Mucinex   -Continuing following with infectious disease and pulmonology.     2.  Generalized weakness and fatigue secondary to advanced COPD and activity intolerance.  -Completed strengthening at TCU and was discharged 5/10/2022 and PT with [facility].     3.  Chronic pain syndrome with chronic back pain from T12 compression fracture  3/9/2021 EKG  and QTc 516  - Patient sees orthopedics for injections. Epidural injection 1/20/2022.  - Gabapentin 200 mg by mouth every morning and 300 mg at bedtime.  - ContinueTramadol 50 mg by mouth twice daily as needed for pain. "   - Continue Salonpas and Voltaren as needed.  -Briefly discussed role of medical cannabis as a means to assist pain control and stimulate appetite.  Hold off at this time.     4. Anorexia and weight loss (40+ pound weight loss since 3/2021) -continues.   - Continue to monitor.   - encouraged Boost twice daily.  -Declined nutrition consult.  -Discussed role of medications like Megace or Dronabinol to assist with appetite stimulation. Also discussed medical cannabis.  Patient wishes to discuss this further with Dr. Darling.     ADVANCED CARE PLANNING/GOALS OF CARE DISCUSSION  -CODE STATUS: DNR/DNI.    - Goals are otherwise life-prolonging.  - POLST document completed 4/13/2022.  -Follow-up with outpatient palliative care in the next 3-4 months for ongoing goals of care discussion and symptom management and support.        HPI    Last seen in Palliative care clinic 5/19/2022.        Irene León is a 77 year old female with a past medical history of severe COPD, chronic hypoxic respiratory failure, uses home oxygen, chronic systolic CHF (Echocardiogram 10/2020 EF 40% and Echo 7/28/2021 55-60%), chronic pain syndrome with T12 compression fracture 10/2020, 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    Hospitalized at [facility] on 4/7/2022 - 4/22/2022 COPD exacerbation, C. Difficile and ZONIA abscess and cavitary pneumonia.  Patient follows with Sidney Velazquez with ID for long term antibiotics treating cavitary abscess and currently on oral vancomycin and cefdinir.  Was seen in the ED/Urgency room 5/2/2022 for RLE skin tear  Patient was discharged to assisted living for strengthening and discharged from there on 5/10/2022.     Today, the patient was seen for:  Chronic pain syndrome, weakness, weight loss/malnutrition, goals of care, emotional support     Prognosis, Goals, or Advance Care Planning was addressed today  with: Yes.  Mood, coping, and/or meaning in the context of serious illness were addressed today: Yes.  Summary/Comments: Patient has life-prolonging goals as she would want medical treatments to treat underlying conditions.       Review of symptoms:  # Pain severity the last 12 hours: moderate  # Dyspnea severity the last 12 hours: moderate  # Nausea severity the last 12 hours: low  # Anxiety severity the last 12 hours: low   Left hip and mid back pain at a 3-4/10-receives good relief after taking gabapentin and tramadol.  Cough: Much improved and reduced in frequency  Diarrhea: none  Constipation: denies  Weakness/Fatigue: moderate  Anorexia: moderate-severe  Patient is on opioids: assessed and bowels ok/no needed changes to plan of care today.      Additional ROS was reviewed and is unremarkable.           Review of Systems:     Besides above, an additional system ROS was reviewed and is unremarkable          Medications:     I have reviewed this patient's medication profile and PDMP.           Physical Exam:   Temp:  [97.7  F (36.5  C)] 97.7  F (36.5  C)  Pulse:  [76] 76  BP: (120)/(72) 120/72    General appearance: alert, cachectic, cooperative and fatigued  Head: Normocephalic, without obvious abnormality, atraumatic, Temporal wasting noted  Eyes: Lids and lashes normal  Nose: no discharge, Nasal cannula in place  Throat: Lips without lesions.  Lungs:  Nonlabored   Neurologic: Alert.  Oriented x4.             Data Reviewed:     Pertinent Labs  Lab Results: personally reviewed.   Lab Results   Component Value Date     06/02/2022    CO2 23 06/02/2022    BUN 15 06/02/2022     Lab Results   Component Value Date    WBC 7.2 06/15/2022    HGB 11.2 06/15/2022    HCT 35.6 06/15/2022    MCV 90 06/15/2022     06/15/2022     AST   Date Value Ref Range Status   05/09/2022 23 0 - 40 U/L Final     ALT   Date Value Ref Range Status   05/09/2022 <9 0 - 45 U/L Final     Alkaline Phosphatase   Date Value Ref Range  Status   05/09/2022 63 45 - 120 U/L Final     Albumin   Date Value Ref Range Status   05/09/2022 2.7 (L) 3.5 - 5.0 g/dL Final         Radiology Results  CT Chest w/o Contrast    Result Date: 7/7/2022  EXAM: CT CHEST W/O CONTRAST LOCATION: [facility] DATE/TIME: 7/6/2022 5:43 PM INDICATION:  Abscess of lung with pneumonia, unspecified laterality (H) COMPARISON: 6/1/2022 TECHNIQUE: CT chest without IV contrast. Multiplanar reformats were obtained. Dose reduction techniques were used. CONTRAST: None. FINDINGS: LUNGS AND PLEURA: Interval decrease in the amount of consolidation surrounding the cavitating lesion in the left upper lobe extends to the apex, the degree of pleural thickening has also slightly decreased in the interval. Consolidative opacities are again seen in the right lower lobe posteriorly, slightly worsened from prior exam. Extensive emphysematous changes of both lungs are again noted. Bilateral opacity seen in the lingula, similar to prior exam. MEDIASTINUM/AXILLAE: No lymphadenopathy. No thoracic aortic aneurysm. Extensive atherosclerotic calcification of the aorta is again noted, overall unchanged from prior exam. CORONARY ARTERY CALCIFICATION: Moderate. UPPER ABDOMEN: No significant finding. MUSCULOSKELETAL: Multilevel discogenic degenerative changes of thoracic spine. Chronic compression fracture of T12 again noted, unchanged.     IMPRESSION: 1.  Slight interval improvement of the left upper lobe consolidation surrounding the cavitating lesion when compared with  6/1/2022. 2.  Slight interval worsening of the consolidative opacities in the right lower lobe concerning for worsening pneumonia 3.  Severe emphysema     MA Screen Bilateral w/Rylan    Result Date: 6/27/2022  BILATERAL FULL FIELD DIGITAL SCREENING MAMMOGRAM WITH TOMOSYNTHESIS Performed on: 6/27/22 Compared to: 06/10/2021, 05/29/2020, and 05/13/2019 Technique:  This study was evaluated with the assistance of  Computer-Aided Detection.  Breast Tomosynthesis was used in interpretation. Findings: The breasts are heterogeneously dense, which may obscure small masses.  There are breast conservation changes in the left breast., There are benign appearing calcifications in both breasts. There is no radiographic evidence of malignancy. IMPRESSION: ACR BI-RADS Category 2: Benign RECOMMENDED FOLLOW-UP: Annual routine screening mammogram The results and recommendations of this examination will be communicated to the patient.        TTS: I have personally spent a total of 40 minutes today in above encounter reviewing patient's medical record, assessing patient and symptoms and providing emotional support with more than 50% of this time spent face-to-face in video visit as documented above.    DELMIS Dobson, CNS  Palliative Care  [phone]

## 2022-07-14 NOTE — PROGRESS NOTES
Stephanie is a 77 year old who is being evaluated via a billable video visit.      How would you like to obtain your AVS? MyChart  If the video visit is dropped, the invitation should be resent by: Text to cell phone: [phone]  Will anyone else be joining your video visit? No        Video-Visit Details    Video Start Time: 1020    Type of service:  Video Visit    Video End Time:    Originating Location (pt. Location): Home    Distant Location (provider location):  [facility] [place]     Platform used for Video Visit: Steve     Pt called prior to video visit, see assessment. Further recommendations and orders per provider.

## 2022-07-14 NOTE — Clinical Note
Follow-up with outpatient palliative care in the next 3 months for ongoing symptom management and goals of care discussion.

## 2022-07-14 NOTE — TELEPHONE ENCOUNTER
Should patient schedule last visit with Laurence in August or wait to see who to schedule with in the next 3 months.

## 2022-07-14 NOTE — TELEPHONE ENCOUNTER
Mirian Walker, [facility]  P Palliative Care Scheduling    Follow-up with outpatient palliative care in the next 3 months for ongoing symptom management and goals of care discussion.

## 2022-07-27 DIAGNOSIS — J43.9 PULMONARY EMPHYSEMA, UNSPECIFIED EMPHYSEMA TYPE (H): ICD-10-CM

## 2022-07-27 DIAGNOSIS — J44.1 COPD EXACERBATION (H): ICD-10-CM

## 2022-07-27 RX ORDER — FAMOTIDINE 20 MG/1
20 TABLET, FILM COATED ORAL AT BEDTIME
Qty: 90 TABLET | Refills: 3 | Status: SHIPPED | OUTPATIENT
Start: 2022-07-27 | End: 2023-01-01

## 2022-07-27 NOTE — TELEPHONE ENCOUNTER
"Last Written Prescription Date:  4/22/22  Last Fill Quantity: 30,  # refills: 0   Last office visit provider:  6/30/22     Requested Prescriptions   Pending Prescriptions Disp Refills     famotidine (PEPCID) 20 MG tablet 30 tablet 0     Sig: Take 1 tablet (20 mg) by mouth At Bedtime       H2 Blockers Protocol Passed - 7/27/2022 11:26 AM        Passed - Patient is age 12 or older        Passed - Recent (12 mo) or future (30 days) visit within the authorizing provider's specialty     Patient has had an office visit with the authorizing provider or a provider within the authorizing providers department within the previous 12 mos or has a future within next 30 days. See \"Patient Info\" tab in inbasket, or \"Choose Columns\" in Meds & Orders section of the refill encounter.              Passed - Medication is active on med list             Mahendra Fitzpatrick RN 07/27/22 11:26 AM  "

## 2022-07-28 ENCOUNTER — INFUSION THERAPY VISIT (OUTPATIENT)
Dept: INFUSION THERAPY | Facility: CLINIC | Age: 77
End: 2022-07-28
Attending: FAMILY MEDICINE
Payer: MEDICARE

## 2022-07-28 DIAGNOSIS — M81.0 SENILE OSTEOPOROSIS: Primary | ICD-10-CM

## 2022-07-28 PROCEDURE — 250N000011 HC RX IP 250 OP 636: Performed by: FAMILY MEDICINE

## 2022-07-28 PROCEDURE — 96372 THER/PROPH/DIAG INJ SC/IM: CPT | Performed by: FAMILY MEDICINE

## 2022-07-28 RX ORDER — MEPERIDINE HYDROCHLORIDE 50 MG/ML
25 INJECTION INTRAMUSCULAR; INTRAVENOUS; SUBCUTANEOUS EVERY 30 MIN PRN
Status: CANCELLED | OUTPATIENT
Start: 2022-08-02

## 2022-07-28 RX ORDER — ALBUTEROL SULFATE 90 UG/1
1-2 AEROSOL, METERED RESPIRATORY (INHALATION)
Status: CANCELLED
Start: 2022-08-02

## 2022-07-28 RX ORDER — DIPHENHYDRAMINE HYDROCHLORIDE 50 MG/ML
50 INJECTION INTRAMUSCULAR; INTRAVENOUS
Status: CANCELLED
Start: 2022-08-02

## 2022-07-28 RX ORDER — NALOXONE HYDROCHLORIDE 0.4 MG/ML
0.2 INJECTION, SOLUTION INTRAMUSCULAR; INTRAVENOUS; SUBCUTANEOUS
Status: CANCELLED | OUTPATIENT
Start: 2022-08-02

## 2022-07-28 RX ORDER — ALBUTEROL SULFATE 0.83 MG/ML
2.5 SOLUTION RESPIRATORY (INHALATION)
Status: CANCELLED | OUTPATIENT
Start: 2022-08-02

## 2022-07-28 RX ORDER — EPINEPHRINE 1 MG/ML
0.3 INJECTION, SOLUTION, CONCENTRATE INTRAVENOUS EVERY 5 MIN PRN
Status: CANCELLED | OUTPATIENT
Start: 2022-08-02

## 2022-07-28 RX ORDER — METHYLPREDNISOLONE SODIUM SUCCINATE 125 MG/2ML
125 INJECTION, POWDER, LYOPHILIZED, FOR SOLUTION INTRAMUSCULAR; INTRAVENOUS
Status: CANCELLED
Start: 2022-08-02

## 2022-07-28 RX ADMIN — ROMOSOZUMAB-AQQG 210 MG: 105 INJECTION, SOLUTION SUBCUTANEOUS at 13:30

## 2022-08-10 ENCOUNTER — TELEPHONE (OUTPATIENT)
Dept: RESPIRATORY THERAPY | Facility: CLINIC | Age: 77
End: 2022-08-10

## 2022-08-11 ENCOUNTER — OFFICE VISIT (OUTPATIENT)
Dept: INTERNAL MEDICINE | Facility: CLINIC | Age: 77
End: 2022-08-11
Payer: MEDICARE

## 2022-08-11 VITALS
BODY MASS INDEX: 21.17 KG/M2 | WEIGHT: 105 LBS | SYSTOLIC BLOOD PRESSURE: 112 MMHG | DIASTOLIC BLOOD PRESSURE: 62 MMHG | OXYGEN SATURATION: 97 % | HEIGHT: 59 IN | HEART RATE: 64 BPM

## 2022-08-11 DIAGNOSIS — J43.9 PULMONARY EMPHYSEMA, UNSPECIFIED EMPHYSEMA TYPE (H): ICD-10-CM

## 2022-08-11 DIAGNOSIS — I50.22 CHRONIC SYSTOLIC CONGESTIVE HEART FAILURE (H): Primary | ICD-10-CM

## 2022-08-11 DIAGNOSIS — Z51.81 ENCOUNTER FOR THERAPEUTIC DRUG MONITORING: ICD-10-CM

## 2022-08-11 DIAGNOSIS — N18.2 CHRONIC RENAL IMPAIRMENT, STAGE 2 (MILD): ICD-10-CM

## 2022-08-11 LAB
ALBUMIN SERPL BCG-MCNC: 3.6 G/DL (ref 3.5–5.2)
ALP SERPL-CCNC: 56 U/L (ref 35–104)
ALT SERPL W P-5'-P-CCNC: 7 U/L (ref 10–35)
ANION GAP SERPL CALCULATED.3IONS-SCNC: 11 MMOL/L (ref 7–15)
AST SERPL W P-5'-P-CCNC: 25 U/L (ref 10–35)
BILIRUB SERPL-MCNC: 0.3 MG/DL
BUN SERPL-MCNC: 17 MG/DL (ref 8–23)
CALCIUM SERPL-MCNC: 8.6 MG/DL (ref 8.8–10.2)
CHLORIDE SERPL-SCNC: 103 MMOL/L (ref 98–107)
CREAT SERPL-MCNC: 1.25 MG/DL (ref 0.51–0.95)
DEPRECATED HCO3 PLAS-SCNC: 23 MMOL/L (ref 22–29)
GFR SERPL CREATININE-BSD FRML MDRD: 44 ML/MIN/1.73M2
GLUCOSE SERPL-MCNC: 87 MG/DL (ref 70–99)
POTASSIUM SERPL-SCNC: 4 MMOL/L (ref 3.4–5.3)
PROT SERPL-MCNC: 7.2 G/DL (ref 6.4–8.3)
SODIUM SERPL-SCNC: 137 MMOL/L (ref 136–145)

## 2022-08-11 PROCEDURE — 99214 OFFICE O/P EST MOD 30 MIN: CPT | Performed by: INTERNAL MEDICINE

## 2022-08-11 PROCEDURE — 36415 COLL VENOUS BLD VENIPUNCTURE: CPT | Performed by: INTERNAL MEDICINE

## 2022-08-11 PROCEDURE — 80053 COMPREHEN METABOLIC PANEL: CPT | Performed by: INTERNAL MEDICINE

## 2022-08-11 NOTE — PROGRESS NOTES
Irene León   77 year old female    Date of Visit: 8/11/2022    Chief Complaint   Patient presents with     COPD     Subjective  Stephanie is here with  for follow-up on her chronic systolic congestive heart failure and mild chronic renal insufficiency with chronic lower extremity edema.    She has severe COPD with cavitary pneumonia and chronic bronchitis.  Managed by pulmonary medicine, she saw them on July 15.  Given Mucomyst, otherwise no change in treatment plan given a 1 year follow-up.  She is on chronic oxygen.  On nebulizers and inhalers.    Quit smoking 14 years ago.    No flare of her bronchitis.  No fever.  She had finished a month of Omnicef and Flagyl in June for cavitating pneumonia.    No chest pain or chest pressure.  She has mild nonobstructing coronary disease on previous angiogram.  Stress test in 2016 was negative.  She continues on simvastatin 40 mg.    She also has a history of occipital stroke and history of high-grade intracranial artery stenosis    Extremity edema is moderate +1.  She reduced her furosemide down to 20 mg a day in June.  She was having worsening creatinine and lower blood pressures at that time.    Blood pressure is on the low side.  Edema stable at +1.    Blood pressure was 100/70 on July 15 at her pulmonary visit.    She did have a follow-up chest CT scan on July 6 that I did review with patient today.  There was a left upper lobe consolidation has improved with some worsening right lower lobe consolidation.  Severe emphysema.    She has had mild renal sufficiency since ATN event with sepsis March 2021.  Creatinine was 1.4 in June, but previous creatinine was 0.99 in April of this year.    Past history of C. difficile colitis in the spring this year treated with oral vancomycin but no diarrhea recurrence at this time.    She has chronic atrial fibrillation on Xarelto and metoprolol.  Also history of DVT.    No bleeding issues.    History of stage I breast cancer  with lumpectomy and radiation in 2017.  She stopped her tamoxifen in June of this year after 5-year treatment.  Saw oncology in June with 1 year follow-up plan.  June 2022 negative mammogram.    DNR/DNI status    Chronic anxiety and insomnia.    History of long QTc syndrome and she still wishes to use tramadol twice a day.  QTC in April of this year was 488.    PMHx:    Past Medical History:   Diagnosis Date     ANATOLIY (acute kidney injury) (H)      Allergic rhinitis      Arrhythmia      Atrial fibrillation with RVR (H)      Bacteremia      Breast cancer (H) 2017     Cardiomyopathy (H)      Centrilobular emphysema (H)      CHF (congestive heart failure) (H)      CKD (chronic kidney disease)      COPD (chronic obstructive pulmonary disease) (H)      Coronary artery disease      Depression      Dysphagia      E. coli sepsis (H)      Factor 5 Leiden mutation, heterozygous (H)      GERD (gastroesophageal reflux disease)      Hx of radiation therapy 2017     Hyperlipidemia      Hypertension      Hypokalemia      Hypomagnesemia      Idiopathic cardiomyopathy (H)      Left hip pain 4/30/2014     OAB (overactive bladder)      Osteoporosis      Sacral insufficiency fracture      Sinusitis      Syncope      Urge incontinence      Vocal cord dysfunction      PSHx:    Past Surgical History:   Procedure Laterality Date     ARTHROPLASTY REVISION HIP Left      BIOPSY BREAST Right 2017     BLADDER SURGERY      bladder interstim with removal     CARDIAC CATHETERIZATION       CATARACT EXTRACTION Left 07/18/2017     IR ABDOMINAL AORTOGRAM  4/16/2003     IR AORTIC ARCH 4 VESSEL ANGIOGRAM  4/16/2003     IR MISCELLANEOUS PROCEDURE  4/16/2003     LUMPECTOMY BREAST Left 06/2017    x2     PICC DOUBLE LUMEN PLACEMENT  4/11/2022          PICC TRIPLE LUMEN PLACEMENT  4/7/2022          ZZC OPEN TX FEMORAL FRACTURE DISTAL MED/LAT CONDYLE Left 10/28/2015    Procedure: OPEN REDUCTION INTERNAL FIXATION LEFT  PROXIMAL FEMUR PERIPROSTHETIC FRACTURE;   "Surgeon: Yovanny Albarran MD;  Location: [facility];  Service: Orthopedics     Immunizations:   Immunization History   Administered Date(s) Administered     COVID-19,PF,Pfizer (12+ Yrs) 03/02/2021, 03/23/2021, 12/08/2021     FLU 6-35 months 11/03/2003     Flu 65+ Years 09/20/2017, 09/25/2018, 10/10/2019     Flu, Unspecified 10/01/2016, 10/01/2018     [id number] Novel Flu 01/05/2010     Influenza (H1N1) 01/05/2010     Influenza (High Dose) 3 valent vaccine 10/30/2014, 10/01/2015, 11/26/2016, 09/25/2018, 10/10/2019, 10/19/2020     Influenza (IIV3) PF 11/03/2003, 11/08/2006, 10/24/2007, 11/28/2008, 10/09/2009, 10/22/2010, 01/26/2012, 10/03/2012, 11/01/2013     Influenza, Quad, High Dose, Pf, 65yr+ (Fluzone HD) 10/19/2020, 12/15/2020     Mantoux Tuberculin Skin Test 12/13/2007, 10/24/2020, 11/03/2020     Pneumo Conj 13-V (2010&after) 05/11/2015     Pneumococcal 23 valent 11/24/1997, 11/21/2007, 05/17/2017     TD (ADULT, 7+) 07/20/2004     TDAP Vaccine (Boostrix) 02/12/2016     Tdap (Adacel,Boostrix) 02/12/2016     Zoster vaccine recombinant adjuvanted (SHINGRIX) 07/31/2020     Zoster vaccine, live 02/18/2009, 10/20/2014       ROS A comprehensive review of systems was performed and was otherwise negative    Medications, allergies, and problem list were reviewed and updated    Exam  /62 (BP Location: Right arm, Patient Position: Sitting, Cuff Size: Adult Regular)   Pulse 64   Ht 1.499 m (4' 11\")   Wt 47.6 kg (105 lb)   SpO2 97%   BMI 21.21 kg/m    Frail elderly female with moderate kyphosis.  Decreased breath sounds throughout but there is no rhonchi or crackles today.  No wheezing.  Heart is regular without murmur.  It is not irregular today.  Abdomen is soft nontender.  +1 ankle edema bilaterally    Assessment/Plan  1. Chronic systolic congestive heart failure (H)  Compensated.  Continue 20 mg once a day furosemide as her blood pressure is on the low side.    April 2022 heart echo with global hypokinesis " ejection fraction 35- 40%.  Mild pulmonary hypertension previously.    2. Pulmonary emphysema, unspecified emphysema type (H)  Severe but stable.  Her rhonchi is improved.  Chronic bronchiectasis and recurrent bronchitis with cavitary pneumonia.  Continue with Mucomyst, nebulizers and pulmonary toilet.    Continue chronic oxygen nasal cannula.    Monitor for recurrent pneumonia or bronchitis symptoms, antibiotic treatment as needed    DNR/DNI    3. Chronic renal impairment, stage 2 (mild)  Worsening creatinine on 40 mg a day furosemide with lower blood pressures.  With pulmonary hypertension may be having some bienvenido hypoperfusion.  Avoid hypotension.  Continue on the 20 mg a day furosemide.  Rechecking kidney labs today    4. Encounter for therapeutic drug monitoring    - Comprehensive metabolic panel    History of nonobstructing coronary disease, no history of MI.  Continues on simvastatin.  History of high-grade intracranial artery stenosis and previous occipital stroke.    Paroxysmal atrial fibrillation, on Xarelto and metoprolol.  Irregular heart rhythm today.    History of breast cancer, no evidence of recurrence, continue yearly mammograms in June    Chronic anxiety and insomnia.  Not using trazodone because of history of long QTc    Chronic pain, uses tramadol and gabapentin    History of C. difficile colitis, no recurrence      Return in about 4 months (around 12/11/2022) for Follow up.   Patient Instructions   No change in medications.    Follow-up in December for routine checkup if you are otherwise stable.    Pankaj Montanez MD, MD        Current Outpatient Medications   Medication Sig Dispense Refill     acetaminophen (TYLENOL) 500 MG tablet Take 1,000 mg by mouth every 8 hours as needed for mild pain or fever        albuterol (PROVENTIL) (2.5 MG/3ML) 0.083% neb solution Take 1 vial (2.5 mg) by nebulization every 2 hours as needed for shortness of breath / dyspnea 90 mL 0     cetirizine (ZYRTEC) 10 MG  tablet Take 10 mg by mouth daily as needed for allergies       chlorhexidine (PERIDEX) 0.12 % solution [CHLORHEXIDINE (PERIDEX) 0.12 % SOLUTION] Apply 15 mL to the mouth or throat at bedtime as needed.        EPINEPHrine (EPIPEN/ADRENACLICK/AUVI-Q) 0.3 mg/0.3 mL injection Inject 0.3 mg into the muscle as needed for anaphylaxis        famotidine (PEPCID) 20 MG tablet Take 1 tablet (20 mg) by mouth At Bedtime 90 tablet 3     fluticasone (FLONASE) 50 MCG/ACT nasal spray Spray 1 spray into both nostrils daily 15.8 mL 0     Fluticasone-Umeclidin-Vilanterol (TRELEGY ELLIPTA) 200-62.5-25 MCG/INH oral inhaler Inhale 1 puff into the lungs At Bedtime       furosemide (LASIX) 20 MG tablet Take 1 tablet (20 mg) by mouth daily 90 tablet 1     gabapentin (NEURONTIN) 100 MG capsule Take 2 capsules (200 mg) by mouth every morning 60 capsule 0     gabapentin (NEURONTIN) 300 MG capsule Take 1 capsule (300 mg) by mouth At Bedtime 30 capsule 0     guaiFENesin (MUCINEX) 600 MG 12 hr tablet Take 2 tablets (1,200 mg) by mouth 2 times daily 60 tablet 0     ipratropium (ATROVENT HFA) 17 MCG/ACT inhaler [ATROVENT HFA 17 MCG/ACTUATION INHALER] USE 2 INHALATIONS EVERY 6 HOURS 77.4 g 3     ipratropium - albuterol 0.5 mg/2.5 mg/3 mL (DUONEB) 0.5-2.5 (3) MG/3ML neb solution Take 1 vial (3 mLs) by nebulization 4 times daily 90 mL 4     melatonin 5 MG tablet Take 1 tablet (5 mg) by mouth At Bedtime (Patient taking differently: Take 10 mg by mouth At Bedtime) 30 tablet 0     metoprolol succinate ER (TOPROL-XL) 25 MG 24 hr tablet Take 3 tablets (75 mg) by mouth At Bedtime 90 tablet 0     multivitamin w/minerals (THERA-VIT-M) tablet Take 1 tablet by mouth daily       rivaroxaban ANTICOAGULANT (XARELTO) 15 MG TABS tablet Take 1 tablet (15 mg) by mouth At Bedtime 90 tablet 3     simvastatin (ZOCOR) 40 MG tablet Take 1 tablet (40 mg) by mouth At Bedtime 90 tablet 3     tamoxifen (NOLVADEX) 20 MG tablet TAKE 1 TABLET DAILY 90 tablet 3     traMADol  (ULTRAM) 50 MG tablet Take 1 tablet (50 mg) by mouth 2 times daily 60 tablet 0     Allergies   Allergen Reactions     Sulfa (Sulfonamide Antibiotics) [Sulfa Drugs] Rash     Headaches and dizziness.     Homeopathic Drugs [External Allergen Needs Reconciliation - See Comment] Unknown and Other (See Comments)     Comment: runny nose, Comment: runny nose     Mold Extracts [Molds & Smuts] Unknown     Morphine (Pf) [Morphine] Unknown     hallucinate     Lisinopril Cough, Unknown and Itching     Comment: cough, Comment: cough     Sulfacetamide Sodium [Sulfacetamide] Rash     Social History     Tobacco Use     Smoking status: Former Smoker     Quit date: 10/28/2007     Years since quittin.7     Smokeless tobacco: Former User     Quit date: 2004   Substance Use Topics     Alcohol use: No     Drug use: No             Subjective   Stephanie is a 77 year old, presenting for the following health issues:  COPD      History of Present Illness       COPD:  She presents for follow up of COPD.  Overall, COPD symptoms are slightly worse since last visit. She has more than usual fatigue or shortness of breath with exertion and no shortness of breath at rest.She sometimes coughs and does not have change in sputum. No recent fever. She can walk the length of 1-2 rooms without stopping to rest. She can not walk a flight of stairs without resting. The patient has had no ED, urgent care, or hospital admissions because of COPD since the last visit.     She eats 2-3 servings of fruits and vegetables daily.She consumes 0 sweetened beverage(s) daily.She exercises with enough effort to increase her heart rate 20 to 29 minutes per day.    She is taking medications regularly.             Review of Systems         Objective    There were no vitals taken for this visit.  There is no height or weight on file to calculate BMI.  Physical Exam                       .  ..

## 2022-08-11 NOTE — PATIENT INSTRUCTIONS
No change in medications.    Follow-up in December for routine checkup if you are otherwise stable.

## 2022-08-25 ENCOUNTER — INFUSION THERAPY VISIT (OUTPATIENT)
Dept: INFUSION THERAPY | Facility: CLINIC | Age: 77
End: 2022-08-25
Attending: FAMILY MEDICINE
Payer: MEDICARE

## 2022-08-25 VITALS
SYSTOLIC BLOOD PRESSURE: 175 MMHG | OXYGEN SATURATION: 94 % | DIASTOLIC BLOOD PRESSURE: 94 MMHG | RESPIRATION RATE: 16 BRPM | HEART RATE: 69 BPM

## 2022-08-25 DIAGNOSIS — M81.0 SENILE OSTEOPOROSIS: Primary | ICD-10-CM

## 2022-08-25 LAB
CALCIUM SERPL-MCNC: 9 MG/DL (ref 8.5–10.5)
CREAT SERPL-MCNC: 1.2 MG/DL (ref 0.6–1.1)
GFR SERPL CREATININE-BSD FRML MDRD: 46 ML/MIN/1.73M2

## 2022-08-25 PROCEDURE — 36415 COLL VENOUS BLD VENIPUNCTURE: CPT

## 2022-08-25 PROCEDURE — 96372 THER/PROPH/DIAG INJ SC/IM: CPT | Performed by: FAMILY MEDICINE

## 2022-08-25 PROCEDURE — 250N000011 HC RX IP 250 OP 636: Performed by: FAMILY MEDICINE

## 2022-08-25 PROCEDURE — 82310 ASSAY OF CALCIUM: CPT | Performed by: FAMILY MEDICINE

## 2022-08-25 PROCEDURE — 82565 ASSAY OF CREATININE: CPT | Performed by: FAMILY MEDICINE

## 2022-08-25 RX ORDER — ALBUTEROL SULFATE 0.83 MG/ML
2.5 SOLUTION RESPIRATORY (INHALATION)
Status: CANCELLED | OUTPATIENT
Start: 2022-08-30

## 2022-08-25 RX ORDER — METHYLPREDNISOLONE SODIUM SUCCINATE 125 MG/2ML
125 INJECTION, POWDER, LYOPHILIZED, FOR SOLUTION INTRAMUSCULAR; INTRAVENOUS
Status: CANCELLED
Start: 2022-08-30

## 2022-08-25 RX ORDER — ALBUTEROL SULFATE 90 UG/1
1-2 AEROSOL, METERED RESPIRATORY (INHALATION)
Status: CANCELLED
Start: 2022-08-30

## 2022-08-25 RX ORDER — NALOXONE HYDROCHLORIDE 0.4 MG/ML
0.2 INJECTION, SOLUTION INTRAMUSCULAR; INTRAVENOUS; SUBCUTANEOUS
Status: CANCELLED | OUTPATIENT
Start: 2022-08-30

## 2022-08-25 RX ORDER — EPINEPHRINE 1 MG/ML
0.3 INJECTION, SOLUTION, CONCENTRATE INTRAVENOUS EVERY 5 MIN PRN
Status: CANCELLED | OUTPATIENT
Start: 2022-08-30

## 2022-08-25 RX ORDER — MEPERIDINE HYDROCHLORIDE 50 MG/ML
25 INJECTION INTRAMUSCULAR; INTRAVENOUS; SUBCUTANEOUS EVERY 30 MIN PRN
Status: CANCELLED | OUTPATIENT
Start: 2022-08-30

## 2022-08-25 RX ORDER — DIPHENHYDRAMINE HYDROCHLORIDE 50 MG/ML
50 INJECTION INTRAMUSCULAR; INTRAVENOUS
Status: CANCELLED
Start: 2022-08-30

## 2022-08-25 RX ADMIN — ROMOSOZUMAB-AQQG 210 MG: 105 INJECTION, SOLUTION SUBCUTANEOUS at 14:03

## 2022-08-30 ENCOUNTER — PATIENT OUTREACH (OUTPATIENT)
Dept: PALLIATIVE CARE | Facility: CLINIC | Age: 77
End: 2022-08-30

## 2022-08-30 DIAGNOSIS — J44.1 COPD EXACERBATION (H): ICD-10-CM

## 2022-08-30 DIAGNOSIS — J43.9 PULMONARY EMPHYSEMA, UNSPECIFIED EMPHYSEMA TYPE (H): ICD-10-CM

## 2022-08-30 NOTE — PROGRESS NOTES
[facility]: Palliative Care                                                                                        Received message  patient requesting refill of tramadol.     Last refill: 7/11/2022  Last office visit: 7/14/2022  Needs follow up apt scheduled    Will route request to MD for review.     Unable to check [place]  Report as not delegate for provider      Signature:  JARRED EstradaN, RN, OCN

## 2022-09-02 RX ORDER — TRAMADOL HYDROCHLORIDE 50 MG/1
50 TABLET ORAL 2 TIMES DAILY
Qty: 60 TABLET | Refills: 0 | Status: SHIPPED | OUTPATIENT
Start: 2022-09-02 | End: 2022-01-01

## 2022-09-12 ENCOUNTER — TELEPHONE (OUTPATIENT)
Dept: RESPIRATORY THERAPY | Facility: CLINIC | Age: 77
End: 2022-09-12

## 2022-09-27 ENCOUNTER — VIRTUAL VISIT (OUTPATIENT)
Dept: INTERNAL MEDICINE | Facility: CLINIC | Age: 77
End: 2022-09-27
Payer: MEDICARE

## 2022-09-27 DIAGNOSIS — J44.9 CHRONIC OBSTRUCTIVE PULMONARY DISEASE, UNSPECIFIED COPD TYPE (H): ICD-10-CM

## 2022-09-27 DIAGNOSIS — J44.1 COPD EXACERBATION (H): Primary | ICD-10-CM

## 2022-09-27 PROCEDURE — 99213 OFFICE O/P EST LOW 20 MIN: CPT | Mod: 95 | Performed by: NURSE PRACTITIONER

## 2022-09-27 RX ORDER — ALBUTEROL SULFATE 90 UG/1
2 AEROSOL, METERED RESPIRATORY (INHALATION) EVERY 6 HOURS
Qty: 18 G | Refills: 2 | Status: SHIPPED | OUTPATIENT
Start: 2022-09-27 | End: 2022-09-30

## 2022-09-27 RX ORDER — PREDNISONE 10 MG/1
TABLET ORAL
Qty: 30 TABLET | Refills: 0 | Status: SHIPPED | OUTPATIENT
Start: 2022-09-27 | End: 2022-10-11

## 2022-09-27 RX ORDER — DOXYCYCLINE HYCLATE 100 MG
100 TABLET ORAL 2 TIMES DAILY
Qty: 20 TABLET | Refills: 0 | Status: SHIPPED | OUTPATIENT
Start: 2022-09-27 | End: 2022-10-11

## 2022-09-27 RX ORDER — IPRATROPIUM BROMIDE 17 UG/1
AEROSOL, METERED RESPIRATORY (INHALATION)
Qty: 77.4 G | Refills: 3 | Status: SHIPPED | OUTPATIENT
Start: 2022-09-27 | End: 2023-01-01

## 2022-09-27 NOTE — PROGRESS NOTES
Stephanie is a 77 year old who is being evaluated via a billable video visit.      How would you like to obtain your AVS? MyChart  If the video visit is dropped, the invitation should be resent by: Text to cell phone: [phone]  Will anyone else be joining your video visit? No          Assessment & Plan       COPD exacerbation (H)  She has been treated for similar exacerbations in the past with doxycycline and prednisone taper with good success.  We reviewed warning signs and symptoms for which she should seek reevaluation    - doxycycline hyclate (VIBRA-TABS) 100 MG tablet; Take 1 tablet (100 mg) by mouth 2 times daily  - predniSONE (DELTASONE) 10 MG tablet; 4 tabs X 3 days, 3 tabs X 3 days, 2 tabs X 3 days, 1 tablet X 3 days  - albuterol (PROAIR HFA/PROVENTIL HFA/VENTOLIN HFA) 108 (90 Base) MCG/ACT inhaler; Inhale 2 puffs into the lungs every 6 hours    Aakash Flores, [facility]   Stephanie is a 77 year old, presenting for the following health issues:  Follow Up (Copd flare)      HPI     Has felt like her respiratory status has been declining for the last few weeks.  More coughing and shortness of breath.  No fevers.    Has a history of severe COPD.  On chronic oxygen.      Review of Systems   Constitutional, HEENT, cardiovascular, pulmonary, gi and gu systems are negative, except as otherwise noted.      Objective           Vitals:  No vitals were obtained today due to virtual visit.    Physical Exam   GENERAL: Healthy, alert and no distress  EYES: Eyes grossly normal to inspection.  No discharge or erythema, or obvious scleral/conjunctival abnormalities.  RESP: No audible wheeze, cough, or visible cyanosis.  No visible retractions or increased work of breathing.    SKIN: Visible skin clear. No significant rash, abnormal pigmentation or lesions.  NEURO: Cranial nerves grossly intact.  Mentation and speech appropriate for age.  PSYCH: Mentation appears normal,  affect normal/bright, judgement and insight intact, normal speech and appearance well-groomed.          Video-Visit Details    Video Start Time: 8:09 AM    Type of service:  Video Visit    Video End Time:8:19 AM    Originating Location (pt. Location): Home    Distant Location (provider location):  [facility]     Platform used for Video Visit: Mary Beth

## 2022-09-29 DIAGNOSIS — J44.1 COPD EXACERBATION (H): ICD-10-CM

## 2022-09-29 NOTE — TELEPHONE ENCOUNTER
Medication Question or Refill        What medication are you calling about (include dose and sig)?:   albuterol (PROAIR HFA/PROVENTIL HFA/VENTOLIN HFA) 108 (90 Base) MCG/ACT inhaler  Controlled Substance Agreement on file:   CSA -- Patient Level:    Controlled Substance Agreement - Opioid - Scan on 1/7/2020       Who prescribed the medication?: Leeson    Do you need a refill? Yes: pt picked up 1    inhaler at [facility]. Wants refiills sent to [facility]. Its much cheaper.    When did you use the medication last? today    Patient offered an appointment? No    Do you have any questions or concerns?  No    Preferred Pharmacy:     [facility] - [address] [address]  Phone: [phone] Fax: [phone]      Could we send this information to you in [facility] or would you prefer to receive a phone call?:   Patient would prefer a phone call   Okay to leave a detailed message?: Yes at   [phone]

## 2022-09-30 RX ORDER — ALBUTEROL SULFATE 90 UG/1
2 AEROSOL, METERED RESPIRATORY (INHALATION) EVERY 6 HOURS
Qty: 18 G | Refills: 2 | Status: ON HOLD | OUTPATIENT
Start: 2022-09-30 | End: 2023-01-01

## 2022-09-30 NOTE — TELEPHONE ENCOUNTER
"New pharmacy.    Last Written Prescription Date:  09/27/2022  Last Fill Quantity: 18 g,  # refills: 2   Last office visit provider: 09/27/2022      Requested Prescriptions   Pending Prescriptions Disp Refills     albuterol (PROAIR HFA/PROVENTIL HFA/VENTOLIN HFA) 108 (90 Base) MCG/ACT inhaler 18 g 2     Sig: Inhale 2 puffs into the lungs every 6 hours       Asthma Maintenance Inhalers - Anticholinergics Passed - 9/29/2022  3:44 PM        Passed - Patient is age 12 years or older        Passed - Recent (12 mo) or future (30 days) visit within the authorizing provider's specialty     Patient has had an office visit with the authorizing provider or a provider within the authorizing providers department within the previous 12 mos or has a future within next 30 days. See \"Patient Info\" tab in inbasket, or \"Choose Columns\" in Meds & Orders section of the refill encounter.              Passed - Medication is active on med list       Short-Acting Beta Agonist Inhalers Protocol  Passed - 9/29/2022  3:44 PM        Passed - Patient is age 12 or older        Passed - Recent (12 mo) or future (30 days) visit within the authorizing provider's specialty     Patient has had an office visit with the authorizing provider or a provider within the authorizing providers department within the previous 12 mos or has a future within next 30 days. See \"Patient Info\" tab in inbasket, or \"Choose Columns\" in Meds & Orders section of the refill encounter.              Passed - Medication is active on med list             Jayda Noguera 09/30/22 1:43 PM  "

## 2022-10-03 ENCOUNTER — TELEPHONE (OUTPATIENT)
Dept: INTERNAL MEDICINE | Facility: CLINIC | Age: 77
End: 2022-10-03

## 2022-10-03 NOTE — TELEPHONE ENCOUNTER
"Nurse Triage SBAR    Is this a 2nd Level Triage? NO    Situation:   Lost her voice x 3 days now; sounded like she's whispering and cannot talk any louder. Able to talk in complete sentences without sounding short of breath.    \"Coughing and SOB is about the same as it has always been\" d/t her COPD. Able to speak without SOB on the phone.     Denies sore throat, fever or chills,  congestion, but a little runny nos,; \"but not like a sinus infection.\" No headache.    Not much improvement at all since her last visit with SUNITA Guerrero on 9/27/22.    Background:   Saw CNP Cesar on 9/27/22 for COPD exacerbation and SOB. Was placed on doxycycline, prednisone, and albuterol inhaler.    Assessment:   Lost her voice x 3 days without any other URI symptoms.     Protocol Recommended Disposition:   No disposition on file.    Recommendation:   Advised pt to be seen in person so provider could complete physical exam; but pt only want to do virtual appt and stated that once she sees provider again tomorrow and SUNITA Guerrero wants her to be seen in person then she will come in.     Not routed-patient scheduled to be seen     Does the patient meet one of the following criteria for ADS visit consideration? 16+ years old, with an [facility] PCP     NEYMAR Crow, RN  [facility]              "

## 2022-10-04 ENCOUNTER — VIRTUAL VISIT (OUTPATIENT)
Dept: INTERNAL MEDICINE | Facility: CLINIC | Age: 77
End: 2022-10-04
Payer: MEDICARE

## 2022-10-04 VITALS — TEMPERATURE: 96 F

## 2022-10-04 DIAGNOSIS — J04.0 LARYNGITIS: Primary | ICD-10-CM

## 2022-10-04 PROCEDURE — 99213 OFFICE O/P EST LOW 20 MIN: CPT | Mod: 93 | Performed by: NURSE PRACTITIONER

## 2022-10-04 RX ORDER — AZITHROMYCIN 250 MG/1
TABLET, FILM COATED ORAL
Qty: 6 TABLET | Refills: 0 | Status: SHIPPED | OUTPATIENT
Start: 2022-10-04 | End: 2022-10-09

## 2022-10-04 ASSESSMENT — PATIENT HEALTH QUESTIONNAIRE - PHQ9: SUM OF ALL RESPONSES TO PHQ QUESTIONS 1-9: 1

## 2022-10-04 NOTE — PROGRESS NOTES
Stephanie is a 77 year old who is being evaluated via a billable telephone visit.      What phone number would you like to be contacted at? [phone]  How would you like to obtain your AVS? MyChart    Assessment & Plan     Laryngitis/cough/chest tightness  She still has a few days left of her prednisone to complete.  Requesting a change in her antibiotic from doxycycline to azithromycin.    She does have prolonged QTC, explained risk associated azithromycin but she accepts these risks and wishes to proceed.    She has used azithromycin in the past without issues.  Fortunately, she has an oxygen saturation monitor and has been checking her oxygen frequently, frequently measuring greater than 96% on 2.5 L nasal cannula.    Aakash Flores, [facility]   Stephanie is a 77 year old, presenting for the following health issues:  Throat Problem (Laryngitis for 3 days, no fever, no other symptoms)      HPI     Looking for follow-up consult on her chest tightness/cough.  We had a telephone visit last week.  Says her cough seems to be a bit better but she has developed some laryngitis symptoms and is requesting a switch from doxycycline to azithromycin which she says historically has worked better in the situations.    She does not feel like she is in any respiratory distress.  No fevers.  Continues to wear oxygen, 2.5 L nasal cannula      Review of Systems   Constitutional, HEENT, cardiovascular, pulmonary, gi and gu systems are negative, except as otherwise noted.      Objective           Vitals:  No vitals were obtained today due to virtual visit.    Physical Exam   healthy, alert and mild distress  PSYCH: Alert and oriented times 3; coherent speech, normal   rate and volume, able to articulate logical thoughts, able   to abstract reason, no tangential thoughts, no hallucinations   or delusions  Her affect is normal  RESP: No cough, no audible wheezing, able to talk in full  sentences  Remainder of exam unable to be completed due to telephone visits          Phone call duration: 10 minutes

## 2022-10-06 ENCOUNTER — TELEPHONE (OUTPATIENT)
Dept: INTERNAL MEDICINE | Facility: CLINIC | Age: 77
End: 2022-10-06

## 2022-10-06 ENCOUNTER — VIRTUAL VISIT (OUTPATIENT)
Dept: INTERNAL MEDICINE | Facility: CLINIC | Age: 77
End: 2022-10-06
Payer: MEDICARE

## 2022-10-06 DIAGNOSIS — Z71.89 COORDINATION OF COMPLEX CARE: Primary | ICD-10-CM

## 2022-10-06 PROBLEM — R26.9 GAIT DISORDER: Status: ACTIVE | Noted: 2017-11-02

## 2022-10-06 PROBLEM — I50.22 CHRONIC SYSTOLIC CONGESTIVE HEART FAILURE (H): Status: ACTIVE | Noted: 2021-02-20

## 2022-10-06 PROBLEM — R23.2 HOT FLASHES DUE TO TAMOXIFEN: Status: ACTIVE | Noted: 2018-03-30

## 2022-10-06 PROBLEM — J32.8 OTHER CHRONIC SINUSITIS: Status: ACTIVE | Noted: 2018-03-09

## 2022-10-06 PROBLEM — Z86.73 PERSONAL HISTORY OF TRANSIENT ISCHEMIC ATTACK (TIA), AND CEREBRAL INFARCTION WITHOUT RESIDUAL DEFICITS: Status: ACTIVE | Noted: 2020-10-24

## 2022-10-06 PROBLEM — S22.080D WEDGE COMPRESSION FRACTURE OF T11-T12 VERTEBRA, SUBSEQUENT ENCOUNTER FOR FRACTURE WITH ROUTINE HEALING: Status: ACTIVE | Noted: 2020-10-24

## 2022-10-06 PROBLEM — H90.5 HIGH FREQUENCY SENSORINEURAL HEARING LOSS OF LEFT EAR: Status: ACTIVE | Noted: 2018-12-10

## 2022-10-06 PROBLEM — G93.41 METABOLIC ENCEPHALOPATHY: Status: ACTIVE | Noted: 2022-10-06

## 2022-10-06 PROBLEM — T45.1X5A HOT FLASHES DUE TO TAMOXIFEN: Status: ACTIVE | Noted: 2018-03-30

## 2022-10-06 PROBLEM — Z91.148 PATIENT'S OTHER NONCOMPLIANCE WITH MEDICATION REGIMEN: Status: ACTIVE | Noted: 2020-10-24

## 2022-10-06 PROBLEM — R09.02 HYPOXIA: Status: ACTIVE | Noted: 2020-10-16

## 2022-10-06 PROBLEM — C50.112 MALIGNANT NEOPLASM OF CENTRAL PORTION OF LEFT FEMALE BREAST (H): Status: ACTIVE | Noted: 2017-07-26

## 2022-10-06 PROBLEM — S32.010S COMPRESSION FRACTURE OF L1 VERTEBRA, SEQUELA: Status: ACTIVE | Noted: 2022-10-06

## 2022-10-06 PROBLEM — T14.8XXA PAIN DUE TO FRACTURE: Status: ACTIVE | Noted: 2022-10-06

## 2022-10-06 PROBLEM — S22.080A T12 COMPRESSION FRACTURE (H): Status: ACTIVE | Noted: 2020-10-19

## 2022-10-06 PROBLEM — E78.5 DYSLIPIDEMIA: Status: ACTIVE | Noted: 2022-10-06

## 2022-10-06 PROBLEM — Z85.3 PERSONAL HISTORY OF MALIGNANT NEOPLASM OF BREAST: Status: ACTIVE | Noted: 2020-10-24

## 2022-10-06 PROBLEM — H93.A3 PULSATILE TINNITUS OF BOTH EARS: Status: ACTIVE | Noted: 2018-12-10

## 2022-10-06 PROBLEM — N60.19 FIBROCYSTIC BREAST: Status: ACTIVE | Noted: 2022-10-06

## 2022-10-06 PROBLEM — Z91.89 AT HIGH RISK FOR IMPAIRED SKIN INTEGRITY: Status: ACTIVE | Noted: 2021-04-12

## 2022-10-06 PROBLEM — R26.89 OTHER ABNORMALITIES OF GAIT AND MOBILITY: Status: ACTIVE | Noted: 2020-10-24

## 2022-10-06 PROBLEM — J45.21 EXACERBATION OF INTERMITTENT ASTHMA, UNSPECIFIED ASTHMA SEVERITY: Status: ACTIVE | Noted: 2022-10-06

## 2022-10-06 PROBLEM — S72.009G: Status: ACTIVE | Noted: 2017-10-04

## 2022-10-06 PROBLEM — M62.81 MUSCLE WEAKNESS (GENERALIZED): Status: ACTIVE | Noted: 2020-10-24

## 2022-10-06 PROBLEM — Z79.01 CHRONIC ANTICOAGULATION: Status: ACTIVE | Noted: 2022-10-06

## 2022-10-06 PROBLEM — N39.41 URGE INCONTINENCE: Status: ACTIVE | Noted: 2022-10-06

## 2022-10-06 PROBLEM — N95.1 HOT FLASHES, MENOPAUSAL: Status: ACTIVE | Noted: 2017-09-20

## 2022-10-06 PROBLEM — B02.9 HERPES ZOSTER WITHOUT COMPLICATION: Status: ACTIVE | Noted: 2022-10-06

## 2022-10-06 PROCEDURE — 99207 PR NO BILLABLE SERVICE THIS VISIT: CPT | Performed by: NURSE PRACTITIONER
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
## 2022-10-06 NOTE — TELEPHONE ENCOUNTER
Outgoing Call:  Please see notes below    Dr. Michelle message relayed to pt.   Pt verbalized understanding and had no further questions or concerns.     Soco AVALOS RN  [facility]
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
## 2022-10-11 ENCOUNTER — HOSPITAL ENCOUNTER (EMERGENCY)
Facility: CLINIC | Age: 77
Discharge: ANOTHER HOSPITAL | End: 2022-10-11
Attending: EMERGENCY MEDICINE | Admitting: EMERGENCY MEDICINE
Payer: MEDICARE

## 2022-10-11 ENCOUNTER — APPOINTMENT (OUTPATIENT)
Dept: RADIOLOGY | Facility: CLINIC | Age: 77
End: 2022-10-11
Attending: EMERGENCY MEDICINE
Payer: MEDICARE

## 2022-10-11 ENCOUNTER — APPOINTMENT (OUTPATIENT)
Dept: CT IMAGING | Facility: CLINIC | Age: 77
End: 2022-10-11
Attending: EMERGENCY MEDICINE
Payer: MEDICARE

## 2022-10-11 VITALS
DIASTOLIC BLOOD PRESSURE: 50 MMHG | SYSTOLIC BLOOD PRESSURE: 97 MMHG | WEIGHT: 104 LBS | TEMPERATURE: 99.3 F | OXYGEN SATURATION: 98 % | RESPIRATION RATE: 20 BRPM | BODY MASS INDEX: 20.96 KG/M2 | HEART RATE: 77 BPM | HEIGHT: 59 IN

## 2022-10-11 DIAGNOSIS — M25.552 HIP PAIN, LEFT: ICD-10-CM
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
## 2022-10-11 DIAGNOSIS — M79.652 PAIN OF LEFT THIGH: ICD-10-CM

## 2022-10-11 LAB — SARS-COV-2 RNA RESP QL NAA+PROBE: NEGATIVE

## 2022-10-11 PROCEDURE — C9803 HOPD COVID-19 SPEC COLLECT: HCPCS

## 2022-10-11 PROCEDURE — 96376 TX/PRO/DX INJ SAME DRUG ADON: CPT

## 2022-10-11 PROCEDURE — U0005 INFEC AGEN DETEC AMPLI PROBE: HCPCS | Performed by: EMERGENCY MEDICINE

## 2022-10-11 PROCEDURE — 96374 THER/PROPH/DIAG INJ IV PUSH: CPT

## 2022-10-11 PROCEDURE — 72192 CT PELVIS W/O DYE: CPT | Mod: MG

## 2022-10-11 PROCEDURE — 99285 EMERGENCY DEPT VISIT HI MDM: CPT | Mod: 25

## 2022-10-11 PROCEDURE — 73552 X-RAY EXAM OF FEMUR 2/>: CPT | Mod: LT

## 2022-10-11 PROCEDURE — 94640 AIRWAY INHALATION TREATMENT: CPT

## 2022-10-11 PROCEDURE — G1010 CDSM STANSON: HCPCS

## 2022-10-11 PROCEDURE — 250N000013 HC RX MED GY IP 250 OP 250 PS 637: Performed by: EMERGENCY MEDICINE

## 2022-10-11 PROCEDURE — 250N000011 HC RX IP 250 OP 636: Performed by: EMERGENCY MEDICINE

## 2022-10-11 RX ORDER — SOLIFENACIN SUCCINATE 5 MG/1
5 TABLET, FILM COATED ORAL DAILY
COMMUNITY
End: 2023-01-01

## 2022-10-11 RX ORDER — ALBUTEROL SULFATE 90 UG/1
2 AEROSOL, METERED RESPIRATORY (INHALATION) EVERY 6 HOURS PRN
Status: DISCONTINUED | OUTPATIENT
Start: 2022-10-11 | End: 2022-10-11 | Stop reason: HOSPADM

## 2022-10-11 RX ORDER — MIRABEGRON 50 MG/1
50 TABLET, EXTENDED RELEASE ORAL DAILY
COMMUNITY
End: 2023-01-01

## 2022-10-11 RX ORDER — HYDROMORPHONE HYDROCHLORIDE 1 MG/ML
0.5 INJECTION, SOLUTION INTRAMUSCULAR; INTRAVENOUS; SUBCUTANEOUS ONCE
Status: COMPLETED | OUTPATIENT
Start: 2022-10-11 | End: 2022-10-11

## 2022-10-11 RX ORDER — METOPROLOL SUCCINATE 50 MG/1
75 TABLET, EXTENDED RELEASE ORAL AT BEDTIME
COMMUNITY
Start: 2022-05-12 | End: 2023-01-01

## 2022-10-11 RX ORDER — CHOLECALCIFEROL (VITAMIN D3) 50 MCG
50 TABLET ORAL DAILY
COMMUNITY
End: 2023-01-01

## 2022-10-11 RX ADMIN — HYDROMORPHONE HYDROCHLORIDE 0.5 MG: 1 INJECTION, SOLUTION INTRAMUSCULAR; INTRAVENOUS; SUBCUTANEOUS at 11:21

## 2022-10-11 RX ADMIN — ALBUTEROL SULFATE 2 PUFF: 90 AEROSOL, METERED RESPIRATORY (INHALATION) at 16:05

## 2022-10-11 RX ADMIN — HYDROMORPHONE HYDROCHLORIDE 0.5 MG: 1 INJECTION, SOLUTION INTRAMUSCULAR; INTRAVENOUS; SUBCUTANEOUS at 15:02

## 2022-10-11 ASSESSMENT — ENCOUNTER SYMPTOMS: ARTHRALGIAS: 1

## 2022-10-11 ASSESSMENT — ACTIVITIES OF DAILY LIVING (ADL)
ADLS_ACUITY_SCORE: 37
ADLS_ACUITY_SCORE: 37
ADLS_ACUITY_SCORE: 38
ADLS_ACUITY_SCORE: 37

## 2022-10-11 NOTE — ED PROVIDER NOTES
EMERGENCY DEPARTMENT ENCOUNTER      NAME: Irene León  AGE: 77 year old female  YOB: 1945  MRN: [id number]  EVALUATION DATE & TIME: 10/11/2022 10:16 AM    PCP: Pankaj oMntanez    ED PROVIDER: Manuel Byrd M.D.      Chief Complaint   Patient presents with     Hip Pain         FINAL IMPRESSION:  1. Fall, initial encounter    2. Hip pain, left    3. Pain of left thigh          ED COURSE & MEDICAL DECISION MAKING:    Pertinent Labs & Imaging studies reviewed. (See chart for details)  ED Course as of 10/11/22 1622   Tue Oct 11, 2022   1455 CT Pelvis Bone wo Contrast  1.  Postoperative changes of left total hip arthroplasty with additional sideplate and cerclage wire fixation.  2.  There is no evidence for acute fracture but there is evidence of some lucency about the upper margin of the femoral endoprosthesis which may represent a component of reactive osteolysis. This is unlikely to result in overall loosening on this   examination but follow-up examination is recommended.  3.  There has been a fracture of the inferior two cerclage wires which protrudes posteriorly into the soft tissues posterior to the femoral shaft. The clinical significance of this is uncertain, however.  4.  The right hip is negative for fracture or CT evidence of avascular necrosis.  5.  The bony pelvis is negative for fracture.  6.  Degenerative change lower lumbar spine and SI joints.   1455 Patient is a 77-year-old woman who fell from standing position few days ago, has had progressive pain in her right buttock, thigh since then.  Unable to bear weight now reliably.  She has ecchymosis to her posterior thigh, tenderness to her leg.  History of femoral neck fracture with operative repair.  CT scan ordered, IV pain medicine ordered.   1456 She is needed repeat doses of Dilaudid here.  CT scan does not show obvious fracture, however given her difficulty ambulating she would not be a good candidate sent home at this time.   1541  Patient had able to ambulate safely at this time.  She is given 2 doses of Dilaudid.  Plan to admit to [facility], we will transfer her over there.  We are currently full in the [place] network, and she states that [facility] is one of the facilities should be willing to transfer over to.  I spoke to Dr. Cole who accepted transfer.       Additional ED Course Timestamps:  10:52 AM Nurse Practitioner student saw the patient and obtained an initial history and exam.    11:00 AM I met with the patient, obtained history, performed an initial exam, and discussed options and plan for diagnostics and treatment here in the ED.    3:49 PM Patient disposition set to transfer.    At the conclusion of the encounter I discussed the results of all of the tests and the disposition. The questions were answered. The patient or family acknowledged understanding and was agreeable with the care plan.       MEDICATIONS GIVEN IN THE EMERGENCY:  Medications   albuterol (PROVENTIL HFA/VENTOLIN HFA) inhaler (2 puffs Inhalation Given 10/11/22 1605)   HYDROmorphone (PF) (DILAUDID) injection 0.5 mg (0.5 mg Intravenous Given 10/11/22 1121)   HYDROmorphone (PF) (DILAUDID) injection 0.5 mg (0.5 mg Intravenous Given 10/11/22 1502)         NEW PRESCRIPTIONS STARTED AT TODAY'S ER VISIT  New Prescriptions    No medications on file          =================================================================    HPI    Patient information was obtained from: EMS    Use of : N/A       Irene León is a 77 year old female with a pertinent history of osteoporosis and sacral insufficiency fracture, and supplemental oxygen use for COPD,  who presents to this ED for evaluation of left hip pain.    The patient arrived via EMS for complaints of left hip pain. Around 4-5 days ago she had fallen and hit the area against her recliner.  She rates her pain a 7/10 pain, and describes the pain as radiating down her left leg. She also endorses some  edema in the same leg. Her symptoms are worsened with while ambulating.    She denies any head trauma. No other complaints at this time.             REVIEW OF SYSTEMS   Review of Systems   Cardiovascular: Positive for leg swelling (left leg).   Musculoskeletal: Positive for arthralgias (left lateral hip).   Skin:        Ecchymosis in posterior thigh        PAST MEDICAL HISTORY:  Past Medical History:   Diagnosis Date     ANATOLIY (acute kidney injury) (H)      Allergic rhinitis      Arrhythmia      Atrial fibrillation with RVR (H)      Bacteremia      Breast cancer (H) 2017     Cardiomyopathy (H)      Centrilobular emphysema (H)      CHF (congestive heart failure) (H)      CKD (chronic kidney disease)      COPD (chronic obstructive pulmonary disease) (H)      Coronary artery disease      Depression      Dysphagia      E. coli sepsis (H)      Factor 5 Leiden mutation, heterozygous (H)      GERD (gastroesophageal reflux disease)      Hx of radiation therapy 2017     Hyperlipidemia      Hypertension      Hypokalemia      Hypomagnesemia      Idiopathic cardiomyopathy (H)      Left hip pain 4/30/2014     OAB (overactive bladder)      Osteoporosis      Sacral insufficiency fracture      Sinusitis      Syncope      Urge incontinence      Vocal cord dysfunction        PAST SURGICAL HISTORY:  Past Surgical History:   Procedure Laterality Date     ARTHROPLASTY REVISION HIP Left      BIOPSY BREAST Right 2017     BLADDER SURGERY      bladder interstim with removal     CARDIAC CATHETERIZATION       CATARACT EXTRACTION Left 07/18/2017     IR ABDOMINAL AORTOGRAM  4/16/2003     IR AORTIC ARCH 4 VESSEL ANGIOGRAM  4/16/2003     IR MISCELLANEOUS PROCEDURE  4/16/2003     LUMPECTOMY BREAST Left 06/2017    x2     PICC DOUBLE LUMEN PLACEMENT  4/11/2022          PICC TRIPLE LUMEN PLACEMENT  4/7/2022          ZZC OPEN TX FEMORAL FRACTURE DISTAL MED/LAT CONDYLE Left 10/28/2015    Procedure: OPEN REDUCTION INTERNAL FIXATION LEFT  PROXIMAL FEMUR  PERIPROSTHETIC FRACTURE;  Surgeon: Yovanny Albarran MD;  Location: [facility] Main OR;  Service: Orthopedics           CURRENT MEDICATIONS:    Current Facility-Administered Medications   Medication     albuterol (PROVENTIL HFA/VENTOLIN HFA) inhaler     Current Outpatient Medications   Medication     acetaminophen (TYLENOL) 500 MG tablet     albuterol (PROAIR HFA/PROVENTIL HFA/VENTOLIN HFA) 108 (90 Base) MCG/ACT inhaler     albuterol (PROVENTIL) (2.5 MG/3ML) 0.083% neb solution     CALCIUM-MAGNESIUM-ZINC PO     cetirizine (ZYRTEC) 10 MG tablet     chlorhexidine (PERIDEX) 0.12 % solution     famotidine (PEPCID) 20 MG tablet     fluticasone (FLONASE) 50 MCG/ACT nasal spray     Fluticasone-Umeclidin-Vilanterol (TRELEGY ELLIPTA) 200-62.5-25 MCG/INH oral inhaler     furosemide (LASIX) 20 MG tablet     ipratropium (ATROVENT HFA) 17 MCG/ACT inhaler     ipratropium - albuterol 0.5 mg/2.5 mg/3 mL (DUONEB) 0.5-2.5 (3) MG/3ML neb solution     melatonin 5 MG tablet     metoprolol succinate ER (TOPROL XL) 50 MG 24 hr tablet     mirabegron (MYRBETRIQ) 50 MG 24 hr tablet     multivitamin w/minerals (THERA-VIT-M) tablet     rivaroxaban ANTICOAGULANT (XARELTO) 15 MG TABS tablet     simvastatin (ZOCOR) 40 MG tablet     solifenacin (VESICARE) 5 MG tablet     tamoxifen (NOLVADEX) 20 MG tablet     traMADol (ULTRAM) 50 MG tablet     vitamin D3 (CHOLECALCIFEROL) 50 mcg (2000 units) tablet       ALLERGIES:  Allergies   Allergen Reactions     Sulfa (Sulfonamide Antibiotics) [Sulfa Drugs] Rash     Headaches and dizziness.     Homeopathic Drugs [External Allergen Needs Reconciliation - See Comment] Unknown and Other (See Comments)     Comment: runny nose, Comment: runny nose     Mold Extracts [Molds & Smuts] Unknown     Morphine (Pf) [Morphine] Unknown     hallucinate     Lisinopril Cough, Unknown and Itching     Comment: cough, Comment: cough     Sulfacetamide Sodium [Sulfacetamide] Rash       FAMILY HISTORY:  Family History   Problem  "Relation Age of Onset     Osteoporosis Other      Prostate Cancer Brother      Breast Cancer Maternal Aunt         age thought to be in her 70's-80's     Prostate Cancer Maternal Uncle        SOCIAL HISTORY:   Social History     Socioeconomic History     Marital status:      Spouse name: None     Number of children: None     Years of education: None     Highest education level: None   Tobacco Use     Smoking status: Former     Types: Cigarettes     Quit date: 10/28/2007     Years since quittin.9     Smokeless tobacco: Former     Quit date: 2004   Substance and Sexual Activity     Alcohol use: No     Drug use: No   Social History Narrative     and lives in home with 3 flight of steps       VITALS:  /57   Pulse 86   Temp 99.3  F (37.4  C) (Temporal)   Resp 20   Ht 1.499 m (4' 11\")   Wt 47.2 kg (104 lb)   SpO2 98%   BMI 21.01 kg/m      PHYSICAL EXAM    Constitutional:  Frail and uncomfortable appearing.  HENT: Normocephalic, atraumatic, mucous membranes moist, nose normal. Neck- Supple, gross ROM intact.   Eyes: Pupils mid-range, conjunctiva without injection, no discharge.   Respiratory: Clear to auscultation bilaterally, no respiratory distress, no wheezing, speaks full sentences easily. No cough. On nasal cannula O2.  Cardiovascular: Normal heart rate, regular rhythm, no murmurs.   GI: Soft, no tenderness to deep palpation in all quadrants, no masses.  Musculoskeletal: Moving all 4 extremities intentionally and without pain. No obvious deformity. Left lateral hip tenderness, with left lower extremity edema up to leg. To tenderness to bones of feet or ankles.  Skin: Warm, dry, no rash. Ecchymosis to posterior thigh.  Neurologic: Alert & oriented x 3, cranial nerves grossly intact.  Psychiatric: Affect normal, cooperative.       LAB:  All pertinent labs reviewed and interpreted.  Labs Ordered and Resulted from Time of ED Arrival to Time of ED Departure - No data to " display    RADIOLOGY:  Reviewed all pertinent imaging. Please see official radiology report.  XR Femur Left 2 Views   Final Result   IMPRESSION: Left RAY with longstem femoral component. Plate and cerclage wire fixation. Suture anchors in the greater tuberosity. Fracture of the cerclage wires with sparing of the second and third cerclage wire from the top. Osteopenia. No acute    fractures are evident. Stable osseous volume loss in the subtrochanteric region.      CT Pelvis Bone wo Contrast   Final Result   IMPRESSION:   1.  Postoperative changes of left total hip arthroplasty with additional sideplate and cerclage wire fixation.   2.  There is no evidence for acute fracture but there is evidence of some lucency about the upper margin of the femoral endoprosthesis which may represent a component of reactive osteolysis. This is unlikely to result in overall loosening on this    examination but follow-up examination is recommended.   3.  There has been a fracture of the inferior two cerclage wires which protrudes posteriorly into the soft tissues posterior to the femoral shaft. The clinical significance of this is uncertain, however.   4.  The right hip is negative for fracture or CT evidence of avascular necrosis.   5.  The bony pelvis is negative for fracture.   6.  Degenerative change lower lumbar spine and SI joints.            I, Lay Lim  am serving as a scribe to document services personally performed by Dr. Manuel Byrd based on my observation and the provider's statements to me. I, Manuel Byrd MD attest that Lay Lim  is acting in a scribe capacity, has observed my performance of the services and has documented them in accordance with my direction.    Manuel Byrd M.D.  Emergency Medicine  [facility] EMERGENCY ROOM  [id number] [place] [id number] [phone]  Dept: [phone]     [place]  MD Manuel  10/11/22 7260

## 2022-10-11 NOTE — PHARMACY-ADMISSION MEDICATION HISTORY
Pharmacy Note - Admission Medication History    Pertinent Provider Information: Daughter at bedside was able to recall patient's medications.   -Patient recently finished her 5-day course of azithromycin and prednisone taper.   -Patient takes Xarelto 15mg daily for Afib and h/o DVT.    ______________________________________________________________________    Prior To Admission (PTA) med list completed and updated in EMR.       PTA Med List   Medication Sig Last Dose     acetaminophen (TYLENOL) 500 MG tablet Take 1,000 mg by mouth every 8 hours as needed for mild pain or fever  Unknown at PRN     albuterol (PROAIR HFA/PROVENTIL HFA/VENTOLIN HFA) 108 (90 Base) MCG/ACT inhaler Inhale 2 puffs into the lungs every 6 hours (Patient taking differently: Inhale 2 puffs into the lungs every 6 hours as needed) Unknown at PRN, will bring     albuterol (PROVENTIL) (2.5 MG/3ML) 0.083% neb solution Take 1 vial (2.5 mg) by nebulization every 2 hours as needed for shortness of breath / dyspnea Unknown at PRN     CALCIUM-MAGNESIUM-ZINC PO Take 1 tablet by mouth daily 10/10/2022     cetirizine (ZYRTEC) 10 MG tablet Take 10 mg by mouth daily as needed for allergies Unknown at PRN     chlorhexidine (PERIDEX) 0.12 % solution [CHLORHEXIDINE (PERIDEX) 0.12 % SOLUTION] Apply 15 mL to the mouth or throat at bedtime as needed.  Unknown at PRN     famotidine (PEPCID) 20 MG tablet Take 1 tablet (20 mg) by mouth At Bedtime 10/10/2022 at HS     fluticasone (FLONASE) 50 MCG/ACT nasal spray Spray 1 spray into both nostrils daily 10/10/2022 at will bring     Fluticasone-Umeclidin-Vilanterol (TRELEGY ELLIPTA) 200-62.5-25 MCG/INH oral inhaler Inhale 1 puff into the lungs At Bedtime 10/10/2022 at HS, will bring     furosemide (LASIX) 20 MG tablet Take 1 tablet (20 mg) by mouth daily 10/10/2022 at AM     ipratropium (ATROVENT HFA) 17 MCG/ACT inhaler [ATROVENT HFA 17 MCG/ACTUATION INHALER] USE 2 INHALATIONS EVERY 6 HOURS (Patient taking differently:  Inhale 2 puffs into the lungs every 6 hours as needed [ATROVENT HFA 17 MCG/ACTUATION INHALER] USE 2 INHALATIONS EVERY 6 HOURS) Unknown at PRN, will bring     ipratropium - albuterol 0.5 mg/2.5 mg/3 mL (DUONEB) 0.5-2.5 (3) MG/3ML neb solution Take 1 vial (3 mLs) by nebulization 4 times daily (Patient taking differently: Take 1 vial (3 mLs) by nebulization every 4 hours as needed) Unknown at PRN     melatonin 5 MG tablet Take 1 tablet (5 mg) by mouth At Bedtime (Patient taking differently: Take 2 tablets (10 mg) by mouth At Bedtime) 10/10/2022 at HS     metoprolol succinate ER (TOPROL XL) 50 MG 24 hr tablet Take 1.5 tablets (75 mg) by mouth At Bedtime 10/10/2022 at HS     mirabegron (MYRBETRIQ) 50 MG 24 hr tablet Take 1 tablet (50 mg) by mouth daily 10/10/2022     multivitamin w/minerals (THERA-VIT-M) tablet Take 1 tablet by mouth daily 10/10/2022     rivaroxaban ANTICOAGULANT (XARELTO) 15 MG TABS tablet Take 1 tablet (15 mg) by mouth At Bedtime 10/10/2022 at HS     simvastatin (ZOCOR) 40 MG tablet Take 1 tablet (40 mg) by mouth At Bedtime 10/10/2022 at HS     solifenacin (VESICARE) 5 MG tablet Take 1 tablet (5 mg) by mouth daily 10/10/2022     tamoxifen (NOLVADEX) 20 MG tablet TAKE 1 TABLET DAILY 10/10/2022     traMADol (ULTRAM) 50 MG tablet Take 1 tablet (50 mg) by mouth 2 times daily 10/10/2022     vitamin D3 (CHOLECALCIFEROL) 50 mcg (2000 units) tablet Take 1 tablet (50 mcg) by mouth daily 10/10/2022       Information source(s): Patient, Family member and CareEverywhere/SureScripts  Method of interview communication: in-person    Summary of Changes to PTA Med List  New: Xarelto, Myrbetriq, Vesicare, Zinc-Ca, Vit D  Discontinued: doxycycline, Epi pen, gabapentin, Mucinex, prednisone  Changed: metoprolol     Patient was asked about OTC/herbal products specifically.  PTA med list reflects this.    In the past week, patient estimated taking medication this percent of the time:  greater than 90%.    Allergies were  reviewed, assessed, and updated with the patient.      Medications currently not available for use during hospital stay. Family/Patient representative states they will bring inhalers to [facility]. If not, daughter is okay with using our supply.     The information provided in this note is only as accurate as the sources available at the time of the update(s).    Thank you for the opportunity to participate in the care of this patient.    Azeem Cool RPH  10/11/2022 12:34 PM

## 2022-10-11 NOTE — ED NOTES
Please call daughter, Leelee, for information. Per pt daughter and pt,  has advanced dementia. Leelee can be reached at [phone].

## 2022-10-11 NOTE — ED TRIAGE NOTES
Pt arrives via EMS for complaints of left hip pain.  Per EMS pt injured the left hip 4-5 days ago when she hit it against a chair that helps lift her up.  EMS also noted redness and warmth to the leg.  Pt arrives on oxygen which she wears at home 2-3L nasal cannula due to COPD.

## 2022-10-12 ENCOUNTER — TELEPHONE (OUTPATIENT)
Dept: RESPIRATORY THERAPY | Facility: CLINIC | Age: 77
End: 2022-10-12

## 2022-10-12 NOTE — TELEPHONE ENCOUNTER
Spoke with her , she fell yesterday, went to  ED, fx vertebrae, transferred to [facility] due to lack of beds in [place].  Safia Davila, RT, CTTS, Chronic Pulmonary Disease Specialist

## 2022-10-20 ENCOUNTER — TELEPHONE (OUTPATIENT)
Dept: INTERNAL MEDICINE | Facility: CLINIC | Age: 77
End: 2022-10-20

## 2022-10-28 ENCOUNTER — TELEPHONE (OUTPATIENT)
Dept: INTERNAL MEDICINE | Facility: CLINIC | Age: 77
End: 2022-10-28

## 2022-10-28 NOTE — TELEPHONE ENCOUNTER
General Call    Contacts       Type Contact Phone/Fax    10/28/2022 03:55 PM CDT Phone (Incoming) lisa [facility] [phone]     call [facility] nursing, at the above number if there are any questions        Reason for Call:  Notification of delay of care for nursing to start   due to patient preference.  Nursing to start on 10/31/22    What are your questions or concerns:  See above    Date of last appointment with provider: N/A

## 2022-10-30 ENCOUNTER — HEALTH MAINTENANCE LETTER (OUTPATIENT)
Age: 77
End: 2022-10-30

## 2022-10-31 ENCOUNTER — LAB REQUISITION (OUTPATIENT)
Dept: LAB | Facility: CLINIC | Age: 77
End: 2022-10-31
Payer: MEDICARE

## 2022-10-31 ENCOUNTER — TELEPHONE (OUTPATIENT)
Dept: INTERNAL MEDICINE | Facility: CLINIC | Age: 77
End: 2022-10-31

## 2022-10-31 DIAGNOSIS — N18.9 CHRONIC KIDNEY DISEASE, UNSPECIFIED: ICD-10-CM

## 2022-10-31 LAB
ANION GAP SERPL CALCULATED.3IONS-SCNC: 7 MMOL/L (ref 5–18)
BUN SERPL-MCNC: 37 MG/DL (ref 8–28)
CALCIUM SERPL-MCNC: 9.1 MG/DL (ref 8.5–10.5)
CHLORIDE BLD-SCNC: 106 MMOL/L (ref 98–107)
CO2 SERPL-SCNC: 24 MMOL/L (ref 22–31)
CREAT SERPL-MCNC: 1.28 MG/DL (ref 0.6–1.1)
GFR SERPL CREATININE-BSD FRML MDRD: 43 ML/MIN/1.73M2
GLUCOSE BLD-MCNC: 74 MG/DL (ref 70–125)
POTASSIUM BLD-SCNC: 4.4 MMOL/L (ref 3.5–5)
SODIUM SERPL-SCNC: 137 MMOL/L (ref 136–145)

## 2022-10-31 PROCEDURE — 36415 COLL VENOUS BLD VENIPUNCTURE: CPT | Mod: ORL | Performed by: INTERNAL MEDICINE

## 2022-10-31 PROCEDURE — 80048 BASIC METABOLIC PNL TOTAL CA: CPT | Mod: ORL | Performed by: INTERNAL MEDICINE

## 2022-10-31 NOTE — TELEPHONE ENCOUNTER
Reason for Call:  Other call back    Detailed comments: [facility] is requesting verbal orders for Nursing to see the patient 2 times a week for 3 weeks and once a week for 3 weeks and then once every other week for 3 weeks. PT reinaldo and patient will be utilizing their virtual nurse program.  Patient had two severe medication interactions, Potassium & Solifenacin and Myrbetriq & Tamoxisen. Home Care also want to report that patient is not taking her Calcium Magnesium Zinc, but is taking a Multivitamin that is not on her medication list.     Phone Number Patient can be reached at: Other phone number:  Katiele- [phone]    Best Time: Any    Can we leave a detailed message on this number? YES    Call taken on 10/31/2022 at 1:03 PM by Sarai Cardoza

## 2022-11-01 NOTE — TELEPHONE ENCOUNTER
Outgoing Call:  Please see notes below    Dr. Pandey's message relayed to Katie from [facility]  -Okay to give order for home care.  Medication interactions noted.  No changes recommended.     Katie verbalized understanding and had no further questions or concerns.     Soco AVALOS RN  [facility]

## 2022-11-03 ENCOUNTER — TELEPHONE (OUTPATIENT)
Dept: INTERNAL MEDICINE | Facility: CLINIC | Age: 77
End: 2022-11-03

## 2022-11-03 NOTE — TELEPHONE ENCOUNTER
The Home Care/Assisted Living/Nursing Facility is calling regarding an established patient.  Has the patient seen Home Care in the past or is currently residing in Assisted Living or Nursing Facility? No.     NATE Wilson calling from [facility] requesting the following orders that are NOT within the Home Care, Assisted Living or Nursing Home Eval and Treatment standing order and must be ordered by a Licensed Practitioner.    Preferred Call Back Number: [phone]; OK to leave detailed messages    Wound care supplies per Wound Care Specialist's recommendation   Has a skin tear to right shin- Looking for order: ok to treat with foam adhesive until healed    FYI to provider:  Had a fall few weeks ago on left hip area without fracture. Pt crossed her leg and bent down to  something yesterday evening and her pain in the left hip went from controllable to uncontrollable.  Patient is on Tramadol 50 mg BID and she took 2 extra doses after evening dose yesterday with 2 hours of each other d/t the pain. Home Care RN did educated pt about the risk of doing that and provided education to patient about not doing again.    Routing to Licensed Practitioner (Provider) to review request and provide approval or recommendation.    Writer has verified Requestor will send fax to have orders signed.    NEYMAR Crow, RN  [facility]

## 2022-11-03 NOTE — TELEPHONE ENCOUNTER
Outgoing Call:    Dr. Michelle verbal Ok relayed to Katie SULLIVAN with [facility].      Soco AVALOS RN  [facility]

## 2022-11-04 NOTE — TELEPHONE ENCOUNTER
The Home Care/Assisted Living/Nursing Facility is calling regarding an established patient.  Has the patient seen Home Care in the past or is currently residing in Assisted Living or Nursing Facility? No.     Martín calling from [facility] requesting the following orders that are NOT within the Home Care, Assisted Living or Nursing Home Eval and Treatment standing order and must be ordered by a Licensed Practitioner.    Preferred Call Back Number: [phone], ok to leave detailed voicemail    Home PT   Twice a week for two weeks  Once a week for 5 weeks  For and strengthening and gait    Routing to Licensed Practitioner (Provider) to review request and provide approval or recommendation.    Writer has verified Requestor will send fax to have orders signed.    Jeanine Parson RN

## 2022-11-07 NOTE — TELEPHONE ENCOUNTER
Left detailed message of order for JOSE MANUEL Resendiz. To call back if he need any clarifications or has any other questions.    JARRED CrowN, RN  [facility]

## 2022-11-07 NOTE — TELEPHONE ENCOUNTER
Received telephone call from patient requesting refill of tramadol.     Last refill: 9/2/22  Last office visit: 7/14/22  Scheduled for follow up 11/14/22     Will route request to MD for review.     Reviewed [place]  Report.

## 2022-11-11 NOTE — TELEPHONE ENCOUNTER
Spoke with Stephanie, doing good, has laryngitis, conversation kept brief due to. no questions for me to day. no issues getting and/or taking their medications.  Reminded when we will call again and to call before if there is a question.  Safia Davila, RT, CTTS, Chronic Pulmonary Disease Specialist

## 2022-11-14 NOTE — PATIENT INSTRUCTIONS
Please increase gabapentin to 200 mg in the morning and 300 mg in the evening and you can increase to 300 mg morning and evening in 2 weeks if you need more pain relief.    Ask Dr. Montanez about the seasonal flu vaccine and whether you qualify for a new covid booster.    Thank you for meeting with us in the [facility].    How to get a hold of us:  For non-urgent matters, MyChart works best.    For more urgent matters, or if you prefer not to use MyChart, call our clinic nurse coordinator Anabela Hillman RN at [phone]    We have an on-call number for evenings and weekends. Please call this only if you are having uncontrolled symptoms or serious side effects from your medicines: [phone].     For refills, please give us a week (5 working days) notice. We don't always have providers available everyday to do refills. If you call the day you run out of your medicine, we may not be able to refill it in time, so call 5 days in advance!

## 2022-11-14 NOTE — TELEPHONE ENCOUNTER
Pt called to clarify dosing directions for gabapentin as discussed with Dr. Miguel this morning.  In review of his note, he mentions patient has been taking gabapentin 100 mg in the morning and 200 mg at night and he recommended she increase to 200 mg in the morning and 300 mg at night.  Patient states she has not been taking gabapentin and has been off it for quite some time.  She has 300 mg capsules on hand from an older prescription.      Explained to her that we will send in a new order for gabapentin 100 mg capsules and she is to take 1 capsule in the morning and 2 capsules at bedtime.  Will plan to call patient in 2 weeks to check in on effectiveness of gabapentin and discuss dose increase at that time if needed.  She verbalized understanding and repeated directions back correctly.    NEYMAR Yoder, RN  Palliative Care Nurse Clinician    [phone] (Direct)  [phone] (Main)  [phone] (Appointment Scheduling)

## 2022-11-14 NOTE — PROGRESS NOTES
Stephanie is a 77 year old who is being evaluated via a billable video visit.      How would you like to obtain your AVS? MyChart  If the video visit is dropped, the invitation should be resent by: Text to cell phone: [phone]  Will anyone else be joining your video visit? No        Video-Visit Details    Video Start Time: 0923    Type of service:  Video Visit    Video End Time:1001    Originating Location (pt. Location): Home        Distant Location (provider location):  On-site    Platform used for Video Visit: Steve Delgadillo    Palliative Care Outpatient Clinic Progress Note    Patient Name:  Irene León  Primary Provider:  Pankaj Montanez     Impressions, Recommendations & Counseling      SYMPTOM ASSESSMENT:  1.  Shortness of breath secondary to advanced COPD, previous lung abscess foe which treated with IV antibiotics and follows with infectious disease and pulmonology  Significant activity intolerance as becomes short of breath with minimal exertion in the home; can walk to the bathroom and back but no further;  -Oxygen 2.5 L nasal cannula  -Continue with nebulizers, inhalers, and Mucinex   -Continuing following with infectious disease and pulmonology.  -Ask pcp about seasonal flu vaccine and covid booster     2.  Generalized weakness and fatigue secondary to advanced COPD and activity intolerance.  -Completed strengthening at TCU and was discharged 5/10/2022 and PT with [facility].     3.  Chronic pain syndrome with chronic back pain from T12 compression fracture  3/9/2021 EKG  and QTc 516; back feels better now, but had a fall three weeks ago and fell back into the arm of her recliner and landed hard on her pyriformis; she had 5 surgeries on left leg and her orthopedist is coordinating her rehab;     - Gabapentin 100 mg by mouth every morning and 200 mg at bedtime I recommended she increase this to 200mg in morning and 300 mg in evening  - ContinueTramadol 50 mg by mouth twice daily  as needed for pain.   - Continue Salonpas and Voltaren as needed.  - Slowly improving with PT but not as quick as she'd like. She feels her hip flexors are 'angry all the time'  -Briefly discussed role of medical cannabis as a means to assist pain control and stimulate appetite.  Hold off at this time.     4. Anorexia and weight loss (40+ pound weight loss since 3/2021) -weighed 104# this morning which is 6# up from her all time low; .   - Continue to monitor.   - encouraged Boost twice daily and carnation breakfast;  -Declined nutrition consult.  -Discussed role of medications like Megace or Dronabinol to assist with appetite stimulation. Also discussed medical cannabis.  Patient wishes to discuss this further with Dr. Montnaez.     ADVANCED CARE PLANNING/GOALS OF CARE DISCUSSION  -CODE STATUS: DNR/DNI.    - Goals are otherwise life-prolonging.  - POLST document completed 4/13/2022.  -Follow-up with outpatient palliative care in the next 3-4 months for ongoing goals of care discussion and symptom management and support.       Chief Complaint: left pyriformis injury from recent fall; respiratory complaints are pretty stable;     Interim History:  Irene León 77 year old female returns to be seen by palliative care today.  She had a fall back into the arm of her recliner three weeks ago and has a left pyriformis injury with some radiation into her left leg and foot; she is getting PT at [facility] and feels like it is improving; she notices that when her pain is bad it does affect her breathing;     Coping:  Pretty well though this recent injury has been a big set back for her; She feels frustrated at times and worries she is using a lot of her energy going and back and forth to the doctor but she does not feel emotionally ready for hospice;     Stephanie takes great mel in her grand kids who are two sets of twins 22 years old and 2 years old;    Social History:  Pertinent changes to social history/social  situation since last visit:none--her  has been  Key support resources: and grand kids are a big source of emotional support  Advance Directive Status:  Has ACP docs and DNR/DNI code status is documented.    Social History     Tobacco Use     Smoking status: Former     Types: Cigarettes     Quit date: 10/28/2007     Years since quitting: 15.0     Smokeless tobacco: Former     Quit date: 9/6/2004   Substance Use Topics     Alcohol use: No     Drug use: No         Allergies   Allergen Reactions     Sulfa (Sulfonamide Antibiotics) [Sulfa Drugs] Rash     Headaches and dizziness.     Homeopathic Drugs [External Allergen Needs Reconciliation - See Comment] Unknown and Other (See Comments)     Comment: runny nose, Comment: runny nose     Mold Extracts [Molds & Smuts] Unknown     Morphine (Pf) [Morphine] Unknown     hallucinate     Lisinopril Cough, Unknown and Itching     Comment: cough, Comment: cough     Sulfacetamide Sodium [Sulfacetamide] Rash     Current Outpatient Medications   Medication Sig Dispense Refill     acetaminophen (TYLENOL) 500 MG tablet Take 1,000 mg by mouth every 8 hours as needed for mild pain or fever        albuterol (PROAIR HFA/PROVENTIL HFA/VENTOLIN HFA) 108 (90 Base) MCG/ACT inhaler Inhale 2 puffs into the lungs every 6 hours (Patient taking differently: Inhale 2 puffs into the lungs every 6 hours as needed) 18 g 2     albuterol (PROVENTIL) (2.5 MG/3ML) 0.083% neb solution Take 1 vial (2.5 mg) by nebulization every 2 hours as needed for shortness of breath / dyspnea 90 mL 0     CALCIUM-MAGNESIUM-ZINC PO Take 1 tablet by mouth daily       cetirizine (ZYRTEC) 10 MG tablet Take 10 mg by mouth daily as needed for allergies       chlorhexidine (PERIDEX) 0.12 % solution [CHLORHEXIDINE (PERIDEX) 0.12 % SOLUTION] Apply 15 mL to the mouth or throat at bedtime as needed.        famotidine (PEPCID) 20 MG tablet Take 1 tablet (20 mg) by mouth At Bedtime 90 tablet 3     fluticasone (FLONASE) 50  MCG/ACT nasal spray Spray 1 spray into both nostrils daily 15.8 mL 0     Fluticasone-Umeclidin-Vilanterol (TRELEGY ELLIPTA) 200-62.5-25 MCG/INH oral inhaler Inhale 1 puff into the lungs At Bedtime       furosemide (LASIX) 20 MG tablet Take 1 tablet (20 mg) by mouth daily 90 tablet 1     ipratropium (ATROVENT HFA) 17 MCG/ACT inhaler [ATROVENT HFA 17 MCG/ACTUATION INHALER] USE 2 INHALATIONS EVERY 6 HOURS (Patient taking differently: Inhale 2 puffs into the lungs every 6 hours as needed [ATROVENT HFA 17 MCG/ACTUATION INHALER] USE 2 INHALATIONS EVERY 6 HOURS) 77.4 g 3     ipratropium - albuterol 0.5 mg/2.5 mg/3 mL (DUONEB) 0.5-2.5 (3) MG/3ML neb solution Take 1 vial (3 mLs) by nebulization 4 times daily (Patient taking differently: Take 1 vial (3 mLs) by nebulization every 4 hours as needed) 90 mL 4     melatonin 5 MG tablet Take 1 tablet (5 mg) by mouth At Bedtime (Patient taking differently: Take 2 tablets (10 mg) by mouth At Bedtime) 30 tablet 0     metoprolol succinate ER (TOPROL XL) 50 MG 24 hr tablet Take 1.5 tablets (75 mg) by mouth At Bedtime       mirabegron (MYRBETRIQ) 50 MG 24 hr tablet Take 1 tablet (50 mg) by mouth daily       multivitamin w/minerals (THERA-VIT-M) tablet Take 1 tablet by mouth daily       rivaroxaban ANTICOAGULANT (XARELTO) 15 MG TABS tablet Take 1 tablet (15 mg) by mouth At Bedtime       simvastatin (ZOCOR) 40 MG tablet Take 1 tablet (40 mg) by mouth At Bedtime 90 tablet 3     solifenacin (VESICARE) 5 MG tablet Take 1 tablet (5 mg) by mouth daily       tamoxifen (NOLVADEX) 20 MG tablet TAKE 1 TABLET DAILY 90 tablet 3     traMADol (ULTRAM) 50 MG tablet Take 1 tablet (50 mg) by mouth 2 times daily 60 tablet 0     vitamin D3 (CHOLECALCIFEROL) 50 mcg (2000 units) tablet Take 1 tablet (50 mcg) by mouth daily       Past Medical History:   Diagnosis Date     ANATOLIY (acute kidney injury) (H)      Allergic rhinitis      Arrhythmia      Atrial fibrillation with RVR (H)      Bacteremia      Breast  cancer (H) 2017     Cardiomyopathy (H)      Centrilobular emphysema (H)      CHF (congestive heart failure) (H)      CKD (chronic kidney disease)      COPD (chronic obstructive pulmonary disease) (H)      Coronary artery disease      Depression      Dysphagia      E. coli sepsis (H)      Factor 5 Leiden mutation, heterozygous (H)      GERD (gastroesophageal reflux disease)      Hx of radiation therapy 2017     Hyperlipidemia      Hypertension      Hypokalemia      Hypomagnesemia      Idiopathic cardiomyopathy (H)      Left hip pain 4/30/2014     OAB (overactive bladder)      Osteoporosis      Sacral insufficiency fracture      Sinusitis      Syncope      Urge incontinence      Vocal cord dysfunction      Past Surgical History:   Procedure Laterality Date     ARTHROPLASTY REVISION HIP Left      BIOPSY BREAST Right 2017     BLADDER SURGERY      bladder interstim with removal     CARDIAC CATHETERIZATION       CATARACT EXTRACTION Left 07/18/2017     IR ABDOMINAL AORTOGRAM  4/16/2003     IR AORTIC ARCH 4 VESSEL ANGIOGRAM  4/16/2003     IR MISCELLANEOUS PROCEDURE  4/16/2003     LUMPECTOMY BREAST Left 06/2017    x2     PICC DOUBLE LUMEN PLACEMENT  4/11/2022          PICC TRIPLE LUMEN PLACEMENT  4/7/2022          ZZC OPEN TX FEMORAL FRACTURE DISTAL MED/LAT CONDYLE Left 10/28/2015    Procedure: OPEN REDUCTION INTERNAL FIXATION LEFT  PROXIMAL FEMUR PERIPROSTHETIC FRACTURE;  Surgeon: Yovanny Albarran MD;  Location: [facility];  Service: Orthopedics       Review of Systems:   ROS: 10 point ROS neg other than the symptoms noted above in the HPI and pertinents here:    Palliative Symptom Review (0=no symptom/no concern, 1=mild, 2=moderate, 3=severe):      Pain: 2      Fatigue: 1      Nausea: 0      Constipation: 0      Diarrhea: 0      Depressive Symptoms: 1      Anxiety: 0      Drowsiness: 0      Poor Appetite: 1      Shortness of Breath: 1      Insomnia: 0      Other: 0      Overall (0 good/no concerns, 3 very poor):   1-2    PPS:  (100% normal, 0% death): 50%    GENERAL APPEARANCE: healthy, alert and no distress; neatly groomed  EYES: Eyes grossly normal to inspection, PERRLA, conjunctivae and sclerae without injection or discharge, EOM intact   RESP:  no increased work of breathing; speaks in 2/3 rd sentences;   MS: No musculoskeletal defects are noted  SKIN: No suspicious lesions or rashes, hydration status appears adequate with normal skin turgor   PSYCH: Alert and oriented x3; speech- coherent , normal rate and volume; able to articulate logical thoughts, able to abstract reason, no tangential thoughts, no hallucinations or delusions, mentation appears normal, Mood is euthymic. Affect is appropriate for this mood state and bright. Thought content is free of suicidal ideation, hallucinations, and delusions.  Eye contact is good during conversation.       Key Data Reviewed:  LABS: Cr 1.28; albumin 3.6;   IMAGING: no recent imaging for review      40 minutes were spent in review of medical record, performing video visit, documentation and care coordination.    Tobi Miguel MD MS FAAFP  [facility] [place] [facility]  Office [phone]  Fax [phone]

## 2022-11-14 NOTE — LETTER
11/14/2022       RE: Irene León  [address]  [place] [address]       Dear Colleague,    [facility] you [facility] your patient[facility]ón[facility]. Please see a copy of my visit note below.    Stephanie is a 77 year old who is being evaluated via a billable video visit.      How would you like to obtain your AVS? MyChart  If the video visit is dropped, the invitation should be resent by: Text to cell phone: [phone]  Will anyone else be joining your video visit? No        Video-Visit Details    Video Start Time: 0923    Type of service:  Video Visit    Video End Time:1001    Originating Location (pt. Location): Home        Distant Location (provider location):  On-site    Platform used for Video Visit: Steve Delgadillo    Palliative Care Outpatient Clinic Progress Note    Patient Name:  Irene León  Primary Provider:  Pankaj Montanez     Impressions, Recommendations & Counseling      SYMPTOM ASSESSMENT:  1.  Shortness of breath secondary to advanced COPD, previous lung abscess foe which treated with IV antibiotics and follows with infectious disease and pulmonology  Significant activity intolerance as becomes short of breath with minimal exertion in the home; can walk to the bathroom and back but no further;  -Oxygen 2.5 L nasal cannula  -Continue with nebulizers, inhalers, and Mucinex   -Continuing following with infectious disease and pulmonology.  -Ask pcp about seasonal flu vaccine and covid booster     2.  Generalized weakness and fatigue secondary to advanced COPD and activity intolerance.  -Completed strengthening at TCU and was discharged 5/10/2022 and PT with [facility].     3.  Chronic pain syndrome with chronic back pain from T12 compression fracture  3/9/2021 EKG  and QTc 516; back feels better now, but had a fall three weeks ago and fell back into the arm of her  recliner and landed hard on her pyriformis; she had 5 surgeries on left leg and her orthopedist is coordinating her rehab;     - Gabapentin 100 mg by mouth every morning and 200 mg at bedtime I recommended she increase this to 200mg in morning and 300 mg in evening  - ContinueTramadol 50 mg by mouth twice daily as needed for pain.   - Continue Salonpas and Voltaren as needed.  - Slowly improving with PT but not as quick as she'd like. She feels her hip flexors are 'angry all the time'  -Briefly discussed role of medical cannabis as a means to assist pain control and stimulate appetite.  Hold off at this time.     4. Anorexia and weight loss (40+ pound weight loss since 3/2021) -weighed 104# this morning which is 6# up from her all time low; .   - Continue to monitor.   - encouraged Boost twice daily and carnation breakfast;  -Declined nutrition consult.  -Discussed role of medications like Megace or Dronabinol to assist with appetite stimulation. Also discussed medical cannabis.  Patient wishes to discuss this further with Dr. Montanez.     ADVANCED CARE PLANNING/GOALS OF CARE DISCUSSION  -CODE STATUS: DNR/DNI.    - Goals are otherwise life-prolonging.  - POLST document completed 4/13/2022.  -Follow-up with outpatient palliative care in the next 3-4 months for ongoing goals of care discussion and symptom management and support.       Chief Complaint: left pyriformis injury from recent fall; respiratory complaints are pretty stable;     Interim History:  Irene León 77 year old female returns to be seen by palliative care today.  She had a fall back into the arm of her recliner three weeks ago and has a left pyriformis injury with some radiation into her left leg and foot; she is getting PT at [facility] and feels like it is improving; she notices that when her pain is bad it does affect her breathing;     Coping:  Pretty well though this recent injury has been a big set back for her; She feels frustrated  at times and worries she is using a lot of her energy going and back and forth to the doctor but she does not feel emotionally ready for hospice;     Stephanie takes great mel in her grand kids who are two sets of twins 22 years old and 2 years old;    Social History:  Pertinent changes to social history/social situation since last visit:none--her  has been  Key support resources: and grand kids are a big source of emotional support  Advance Directive Status:  Has ACP docs and DNR/DNI code status is documented.    Social History     Tobacco Use     Smoking status: Former     Types: Cigarettes     Quit date: 10/28/2007     Years since quitting: 15.0     Smokeless tobacco: Former     Quit date: 9/6/2004   Substance Use Topics     Alcohol use: No     Drug use: No         Allergies   Allergen Reactions     Sulfa (Sulfonamide Antibiotics) [Sulfa Drugs] Rash     Headaches and dizziness.     Homeopathic Drugs [External Allergen Needs Reconciliation - See Comment] Unknown and Other (See Comments)     Comment: runny nose, Comment: runny nose     Mold Extracts [Molds & Smuts] Unknown     Morphine (Pf) [Morphine] Unknown     hallucinate     Lisinopril Cough, Unknown and Itching     Comment: cough, Comment: cough     Sulfacetamide Sodium [Sulfacetamide] Rash     Current Outpatient Medications   Medication Sig Dispense Refill     acetaminophen (TYLENOL) 500 MG tablet Take 1,000 mg by mouth every 8 hours as needed for mild pain or fever        albuterol (PROAIR HFA/PROVENTIL HFA/VENTOLIN HFA) 108 (90 Base) MCG/ACT inhaler Inhale 2 puffs into the lungs every 6 hours (Patient taking differently: Inhale 2 puffs into the lungs every 6 hours as needed) 18 g 2     albuterol (PROVENTIL) (2.5 MG/3ML) 0.083% neb solution Take 1 vial (2.5 mg) by nebulization every 2 hours as needed for shortness of breath / dyspnea 90 mL 0     CALCIUM-MAGNESIUM-ZINC PO Take 1 tablet by mouth daily       cetirizine (ZYRTEC) 10 MG tablet Take 10 mg  by mouth daily as needed for allergies       chlorhexidine (PERIDEX) 0.12 % solution [CHLORHEXIDINE (PERIDEX) 0.12 % SOLUTION] Apply 15 mL to the mouth or throat at bedtime as needed.        famotidine (PEPCID) 20 MG tablet Take 1 tablet (20 mg) by mouth At Bedtime 90 tablet 3     fluticasone (FLONASE) 50 MCG/ACT nasal spray Spray 1 spray into both nostrils daily 15.8 mL 0     Fluticasone-Umeclidin-Vilanterol (TRELEGY ELLIPTA) 200-62.5-25 MCG/INH oral inhaler Inhale 1 puff into the lungs At Bedtime       furosemide (LASIX) 20 MG tablet Take 1 tablet (20 mg) by mouth daily 90 tablet 1     ipratropium (ATROVENT HFA) 17 MCG/ACT inhaler [ATROVENT HFA 17 MCG/ACTUATION INHALER] USE 2 INHALATIONS EVERY 6 HOURS (Patient taking differently: Inhale 2 puffs into the lungs every 6 hours as needed [ATROVENT HFA 17 MCG/ACTUATION INHALER] USE 2 INHALATIONS EVERY 6 HOURS) 77.4 g 3     ipratropium - albuterol 0.5 mg/2.5 mg/3 mL (DUONEB) 0.5-2.5 (3) MG/3ML neb solution Take 1 vial (3 mLs) by nebulization 4 times daily (Patient taking differently: Take 1 vial (3 mLs) by nebulization every 4 hours as needed) 90 mL 4     melatonin 5 MG tablet Take 1 tablet (5 mg) by mouth At Bedtime (Patient taking differently: Take 2 tablets (10 mg) by mouth At Bedtime) 30 tablet 0     metoprolol succinate ER (TOPROL XL) 50 MG 24 hr tablet Take 1.5 tablets (75 mg) by mouth At Bedtime       mirabegron (MYRBETRIQ) 50 MG 24 hr tablet Take 1 tablet (50 mg) by mouth daily       multivitamin w/minerals (THERA-VIT-M) tablet Take 1 tablet by mouth daily       rivaroxaban ANTICOAGULANT (XARELTO) 15 MG TABS tablet Take 1 tablet (15 mg) by mouth At Bedtime       simvastatin (ZOCOR) 40 MG tablet Take 1 tablet (40 mg) by mouth At Bedtime 90 tablet 3     solifenacin (VESICARE) 5 MG tablet Take 1 tablet (5 mg) by mouth daily       tamoxifen (NOLVADEX) 20 MG tablet TAKE 1 TABLET DAILY 90 tablet 3     traMADol (ULTRAM) 50 MG tablet Take 1 tablet (50 mg) by mouth 2  times daily 60 tablet 0     vitamin D3 (CHOLECALCIFEROL) 50 mcg (2000 units) tablet Take 1 tablet (50 mcg) by mouth daily       Past Medical History:   Diagnosis Date     ANATOLIY (acute kidney injury) (H)      Allergic rhinitis      Arrhythmia      Atrial fibrillation with RVR (H)      Bacteremia      Breast cancer (H) 2017     Cardiomyopathy (H)      Centrilobular emphysema (H)      CHF (congestive heart failure) (H)      CKD (chronic kidney disease)      COPD (chronic obstructive pulmonary disease) (H)      Coronary artery disease      Depression      Dysphagia      E. coli sepsis (H)      Factor 5 Leiden mutation, heterozygous (H)      GERD (gastroesophageal reflux disease)      Hx of radiation therapy 2017     Hyperlipidemia      Hypertension      Hypokalemia      Hypomagnesemia      Idiopathic cardiomyopathy (H)      Left hip pain 4/30/2014     OAB (overactive bladder)      Osteoporosis      Sacral insufficiency fracture      Sinusitis      Syncope      Urge incontinence      Vocal cord dysfunction      Past Surgical History:   Procedure Laterality Date     ARTHROPLASTY REVISION HIP Left      BIOPSY BREAST Right 2017     BLADDER SURGERY      bladder interstim with removal     CARDIAC CATHETERIZATION       CATARACT EXTRACTION Left 07/18/2017     IR ABDOMINAL AORTOGRAM  4/16/2003     IR AORTIC ARCH 4 VESSEL ANGIOGRAM  4/16/2003     IR MISCELLANEOUS PROCEDURE  4/16/2003     LUMPECTOMY BREAST Left 06/2017    x2     PICC DOUBLE LUMEN PLACEMENT  4/11/2022          PICC TRIPLE LUMEN PLACEMENT  4/7/2022          ZZC OPEN TX FEMORAL FRACTURE DISTAL MED/LAT CONDYLE Left 10/28/2015    Procedure: OPEN REDUCTION INTERNAL FIXATION LEFT  PROXIMAL FEMUR PERIPROSTHETIC FRACTURE;  Surgeon: Yovanny Albarran MD;  Location: [facility];  Service: Orthopedics       Review of Systems:   ROS: 10 point ROS neg other than the symptoms noted above in the HPI and pertinents here:    Palliative Symptom Review (0=no symptom/no concern,  1=mild, 2=moderate, 3=severe):      Pain: 2      Fatigue: 1      Nausea: 0      Constipation: 0      Diarrhea: 0      Depressive Symptoms: 1      Anxiety: 0      Drowsiness: 0      Poor Appetite: 1      Shortness of Breath: 1      Insomnia: 0      Other: 0      Overall (0 good/no concerns, 3 very poor):  1-2    PPS:  (100% normal, 0% death): 50%    GENERAL APPEARANCE: healthy, alert and no distress; neatly groomed  EYES: Eyes grossly normal to inspection, PERRLA, conjunctivae and sclerae without injection or discharge, EOM intact   RESP:  no increased work of breathing; speaks in 2/3 rd sentences;   MS: No musculoskeletal defects are noted  SKIN: No suspicious lesions or rashes, hydration status appears adequate with normal skin turgor   PSYCH: Alert and oriented x3; speech- coherent , normal rate and volume; able to articulate logical thoughts, able to abstract reason, no tangential thoughts, no hallucinations or delusions, mentation appears normal, Mood is euthymic. Affect is appropriate for this mood state and bright. Thought content is free of suicidal ideation, hallucinations, and delusions.  Eye contact is good during conversation.       Key Data Reviewed:  LABS: Cr 1.28; albumin 3.6;   IMAGING: no recent imaging for review    40 minutes were spent in review of medical record, performing video visit, documentation and care coordination.    Again, thank you for allowing me to participate in the care of your patient.      Sincerely,    Tobi Miguel MD

## 2022-11-16 NOTE — PATIENT INSTRUCTIONS
Flu shot today.    COVID booster today.    No changes in medications.    Follow-up with Dr. Darling for routine checkup sometime in the spring when the weather is a bit warmer.    Make sure to change positions slowly to minimize fall risk

## 2022-11-16 NOTE — PROGRESS NOTES
Assessment & Plan     Need for viral immunization  COVID booster and influenza vaccinations provided today.    Left hip pain  We reviewed her hospital discharge summary.  She is getting physical therapy now.  Feeling safe at home.    Patient Instructions   Flu shot today.    COVID booster today.    No changes in medications.    Follow-up with Dr. Darling for routine checkup sometime in the spring when the weather is a bit warmer.    Make sure to change positions slowly to minimize fall risk      Prescription drug management  18 minutes spent on the date of the encounter doing chart review, history and exam, documentation and further activities per the note     MED REC REQUIRED  Post Medication Reconciliation Status:  Discharge medications reconciled, continue medications without change    See Patient Instructions    Return in about 6 months (around 5/16/2023).    Aakash Flores, CNP  [facility]    Erik Brown is a 77 year old, presenting for the following health issues:  Hospital F/U      [facility]       Hospital Follow-up Visit:    Hospital/Nursing Home/ Rehab Facility: [facility]  Date of Admission: 10/13/22  Date of Discharge: 10/28/22  Reason(s) for Admission: Fall, soft tissue damage       Was your hospitalization related to COVID-19? No   Problems taking medications regularly:  None  Medication changes since discharge: None  Problems adhering to non-medication therapy:  None    Summary of hospitalization:  [facility] discharge summary reviewed  Diagnostic Tests/Treatments reviewed.  Follow up needed: none  Other Healthcare Providers Involved in Patient s Care:         None  Update since discharge: improved.         Plan of care communicated with patient             Review of Systems   Constitutional, HEENT, cardiovascular, pulmonary, gi and gu systems are negative, except as otherwise noted.      Objective    /62 (BP Location: Left arm, Patient  Position: Sitting, Cuff Size: Adult Regular)   Pulse 60   Temp 98.2  F (36.8  C) (Temporal)   SpO2 96%   There is no height or weight on file to calculate BMI.  Physical Exam   Patient sitting comfortably in exam room in her wheelchair

## 2022-11-17 NOTE — TELEPHONE ENCOUNTER
The Home Care/Assisted Living/Nursing Facility is calling regarding an established patient.  Has the patient seen Home Care in the past or is currently residing in Assisted Living or Nursing Facility? Yes.     NATE Wilson calling from [facility] requesting the following orders that are within the Home Care, Assisted Living or Nursing Home Eval and Treatment standing order and can be signed as standing order signature required by RN.    Preferred Call Back Number: [phone] (OK for detailed message)    Social work evaluation and treatment: care planning, future cares, metro mobility assistance.     Any additional Orders:  Are there any orders requested, not stated above, that are outside of the standing order and must be routed to a licensed practitioner for approval?    No    Writer has verified [employer]or will send fax to have orders signed.    Yamini Guzmán RN, BSN  [facility]

## 2022-11-22 NOTE — PROGRESS NOTES
Stephanie is a 77 year old who is being evaluated via a billable video visit.      How would you like to obtain your AVS? MyChart  If the video visit is dropped, the invitation should be resent by: Text to cell phone: [phone]  Will anyone else be joining your video visit? No          Assessment & Plan     Hoarseness  In review of her chart, it appears as though this is a problem that she has struggled with intermittently.  She saw Dr. Mann in February 2016 for hoarseness.  She underwent nasal laryngoscopic examination at that time.  There was a discussion about possibly referring her to speech therapy and possibly doing VC injections if her problem were to persist.    In review of her pulmonary notes with Dr. Dietrich, she has had some hoarseness with Spiriva but says that she has not been having problems with hoarseness historically while using the Trelegy Ellipta    She is worried that her hoarseness could be indicative of cancer.     - Adult ENT  Referral; Future    Physical deconditioning  She is frustrated because her daughter is adamant that she does not drive.  The patient wants it noted that she is neurologically intact with no history of confusion or mental status changes.  It sounds like her car needs to have the brakes fixed so she has not driven in some time.  She usually has family members to help her get to appointments.    I did not perform a neurological exam today but historically there has not been evidence of focal neurological deficits, dementia, or mental status changes    Pulmonary emphysema, unspecified emphysema type (H)  She says her COPD is at its baseline currently.  On chronic nasal cannula oxygen    Benign essential hypertension  Has been checking her blood pressures at home with most in normal range    Aakash Flores, [facility]   Stephanie is a 77 year old, presenting for the following health issues:  Throat Problem (6  weeks- no other symptoms)      HPI     Continues to struggle with hoarseness.  This is a problem that she always has but then can wax and wane in severity.  Right now, she is going on 6 weeks of essentially nothing better than a whisper.  She is otherwise completely asymptomatic      Review of Systems   Constitutional, HEENT, cardiovascular, pulmonary, gi and gu systems are negative, except as otherwise noted.      Objective    Vitals - Patient Reported  Weight (Patient Reported): 47.2 kg (104 lb)        Physical Exam   GENERAL: Healthy, alert and no distress  EYES: Eyes grossly normal to inspection.  No discharge or erythema, or obvious scleral/conjunctival abnormalities.  RESP: No audible wheeze, cough, or visible cyanosis.  No visible retractions or increased work of breathing.    SKIN: Visible skin clear. No significant rash, abnormal pigmentation or lesions.  NEURO: Cranial nerves grossly intact.  Mentation and speech appropriate for age.  PSYCH: Mentation appears normal, affect normal/bright, judgement and insight intact, normal speech and appearance well-groomed.          Video-Visit Details    Video Start Time: 1045    Type of service:  Video Visit    Video End Time:11:07 AM    Originating Location (pt. Location): Home        Distant Location (provider location):  On-site    Platform used for Video Visit: Mary Beth

## 2022-11-28 NOTE — TELEPHONE ENCOUNTER
Reason for Call: Request for an order or referral:    Order or referral being requested: Physical Therapy Frequency Change    Date needed: as soon as possible    Additional comments: They would like the verbal okay to now see the patient twice a week for 2 weeks, then once a week for 2 weeks. Okay to leave detailed VM on secured VM.     Phone number Patient can be reached at:  Other phone number:  [phone]    Best Time:  Anytime    Can we leave a detailed message on this number?  YES    Call taken on 11/28/2022 at 3:11 PM by Fatmata Brandon, CMA

## 2022-11-29 NOTE — TELEPHONE ENCOUNTER
"Call placed to pt to discuss effectiveness of gabapentin 100 mg Qam and 200 mg at bedtime. She said she is tolerating it but \"it is not touching the pain.\" Discussed increasing to 200 mg in the morning and 300 mg at bedtime as previously discussed with Dr. Miguel. She will start that increase today and has enough medication on hand. Will plan to call again in  2 weeks to discuss effectiveness.    NEYMAR Yoder, RN  Palliative Care Nurse Clinician    [phone] (Direct)  [phone] (Main)  [phone] (Appointment Scheduling)       "

## 2022-12-09 NOTE — TELEPHONE ENCOUNTER
I spoke with Stephanie. She stated she is doing well today but did not feel like talking. She has our contact information should she have any problems or questions for she would like to speak with us about.  Sharon Ryan, RT, TTS, Chronic Pulmonary Disease Specialist

## 2022-12-13 NOTE — TELEPHONE ENCOUNTER
Call placed to patient to discuss effectiveness of gabapentin increase. She has tolerated the increase with no noted side effects, however she hasn't noticed an improvement in pain. Instructed her to  increase to 300 mg bid, as previously discussed with Dr. Miguel. She has enough medication on hand.    Pt scheduled for a video rtn visit with Dr. Miguel on 1/17. She will update him about pain control at that time.     NEYMAR Yoder, RN  Palliative Care Nurse Clinician    [phone] (Direct)  [phone] (Main)  [phone] (Appointment Scheduling)

## 2022-12-15 NOTE — TELEPHONE ENCOUNTER
Patient leaves voicemail requesting a refill of Tramadol.      Last Written Prescription Date:  11/7/22  Last Fill Quantity: 60,  # refills: 0   Last office visit provider:  11/14/22     Requested Prescriptions   Pending Prescriptions Disp Refills     traMADol (ULTRAM) 50 MG tablet 60 tablet 0     Sig: Take 1 tablet (50 mg) by mouth 2 times daily       There is no refill protocol information for this order          Bianca Coates RN 12/15/22 2:30 PM

## 2022-12-16 NOTE — TELEPHONE ENCOUNTER
[facility] home [facility] call stating that patient missed appointment for physical therapy this week due to preparing for the holiday. Just want to let the provider know.

## 2023-01-01 ENCOUNTER — TELEPHONE (OUTPATIENT)
Dept: INTERNAL MEDICINE | Facility: CLINIC | Age: 78
End: 2023-01-01
Payer: MEDICARE

## 2023-01-01 ENCOUNTER — TELEPHONE (OUTPATIENT)
Dept: PALLIATIVE CARE | Facility: CLINIC | Age: 78
End: 2023-01-01
Payer: MEDICARE

## 2023-01-01 ENCOUNTER — HOSPITAL ENCOUNTER (OUTPATIENT)
Dept: MAMMOGRAPHY | Facility: CLINIC | Age: 78
Discharge: HOME | End: 2023-06-26
Attending: INTERNAL MEDICINE
Payer: MEDICARE

## 2023-01-01 ENCOUNTER — APPOINTMENT (OUTPATIENT)
Dept: PHYSICAL THERAPY | Facility: CLINIC | Age: 78
DRG: 193 | End: 2023-01-01
Payer: MEDICARE

## 2023-01-01 ENCOUNTER — APPOINTMENT (OUTPATIENT)
Dept: CARDIOLOGY | Facility: CLINIC | Age: 78
DRG: 193 | End: 2023-01-01
Attending: INTERNAL MEDICINE
Payer: MEDICARE

## 2023-01-01 ENCOUNTER — TELEPHONE (OUTPATIENT)
Dept: RESPIRATORY THERAPY | Facility: CLINIC | Age: 78
End: 2023-01-01

## 2023-01-01 ENCOUNTER — APPOINTMENT (OUTPATIENT)
Dept: OCCUPATIONAL THERAPY | Facility: CLINIC | Age: 78
DRG: 291 | End: 2023-01-01
Payer: MEDICARE

## 2023-01-01 ENCOUNTER — APPOINTMENT (OUTPATIENT)
Dept: PHYSICAL THERAPY | Facility: CLINIC | Age: 78
DRG: 291 | End: 2023-01-01
Payer: MEDICARE

## 2023-01-01 ENCOUNTER — LAB REQUISITION (OUTPATIENT)
Dept: LAB | Facility: CLINIC | Age: 78
End: 2023-01-01
Payer: MEDICARE

## 2023-01-01 ENCOUNTER — TELEPHONE (OUTPATIENT)
Dept: ONCOLOGY | Facility: CLINIC | Age: 78
End: 2023-01-01
Payer: MEDICARE

## 2023-01-01 ENCOUNTER — OFFICE VISIT (OUTPATIENT)
Dept: CARDIOLOGY | Facility: CLINIC | Age: 78
End: 2023-01-01
Payer: MEDICARE

## 2023-01-01 ENCOUNTER — TELEPHONE (OUTPATIENT)
Dept: CARDIOLOGY | Facility: CLINIC | Age: 78
End: 2023-01-01
Payer: MEDICARE

## 2023-01-01 ENCOUNTER — TRANSITIONAL CARE UNIT VISIT (OUTPATIENT)
Dept: GERIATRICS | Facility: CLINIC | Age: 78
End: 2023-01-01
Payer: MEDICARE

## 2023-01-01 ENCOUNTER — APPOINTMENT (OUTPATIENT)
Dept: ULTRASOUND IMAGING | Facility: CLINIC | Age: 78
DRG: 193 | End: 2023-01-01
Attending: STUDENT IN AN ORGANIZED HEALTH CARE EDUCATION/TRAINING PROGRAM
Payer: MEDICARE

## 2023-01-01 ENCOUNTER — HOSPITAL ENCOUNTER (OUTPATIENT)
Facility: CLINIC | Age: 78
Setting detail: OBSERVATION
Discharge: HOME | End: 2023-08-28
Attending: EMERGENCY MEDICINE | Admitting: FAMILY MEDICINE
Payer: MEDICARE

## 2023-01-01 ENCOUNTER — PATIENT OUTREACH (OUTPATIENT)
Dept: ONCOLOGY | Facility: CLINIC | Age: 78
End: 2023-01-01

## 2023-01-01 ENCOUNTER — PATIENT OUTREACH (OUTPATIENT)
Dept: ONCOLOGY | Facility: CLINIC | Age: 78
End: 2023-01-01
Payer: MEDICARE

## 2023-01-01 ENCOUNTER — VIRTUAL VISIT (OUTPATIENT)
Dept: RADIATION ONCOLOGY | Facility: HOSPITAL | Age: 78
End: 2023-01-01
Attending: FAMILY MEDICINE
Payer: MEDICARE

## 2023-01-01 ENCOUNTER — TELEPHONE (OUTPATIENT)
Dept: GERIATRICS | Facility: CLINIC | Age: 78
End: 2023-01-01
Payer: MEDICARE

## 2023-01-01 ENCOUNTER — TELEPHONE (OUTPATIENT)
Dept: INTERNAL MEDICINE | Facility: CLINIC | Age: 78
End: 2023-01-01

## 2023-01-01 ENCOUNTER — APPOINTMENT (OUTPATIENT)
Dept: OCCUPATIONAL THERAPY | Facility: CLINIC | Age: 78
DRG: 193 | End: 2023-01-01
Attending: INTERNAL MEDICINE
Payer: MEDICARE

## 2023-01-01 ENCOUNTER — VIRTUAL VISIT (OUTPATIENT)
Dept: ONCOLOGY | Facility: CLINIC | Age: 78
End: 2023-01-01
Attending: INTERNAL MEDICINE
Payer: MEDICARE

## 2023-01-01 ENCOUNTER — APPOINTMENT (OUTPATIENT)
Dept: RADIOLOGY | Facility: CLINIC | Age: 78
DRG: 190 | End: 2023-01-01
Attending: EMERGENCY MEDICINE
Payer: MEDICARE

## 2023-01-01 ENCOUNTER — HOSPITAL ENCOUNTER (INPATIENT)
Facility: CLINIC | Age: 78
LOS: 4 days | Discharge: HOME | DRG: 291 | End: 2023-09-19
Attending: EMERGENCY MEDICINE | Admitting: INTERNAL MEDICINE
Payer: MEDICARE

## 2023-01-01 ENCOUNTER — VIRTUAL VISIT (OUTPATIENT)
Dept: RADIATION ONCOLOGY | Facility: CLINIC | Age: 78
End: 2023-01-01
Attending: INTERNAL MEDICINE
Payer: MEDICARE

## 2023-01-01 ENCOUNTER — APPOINTMENT (OUTPATIENT)
Dept: PHYSICAL THERAPY | Facility: CLINIC | Age: 78
DRG: 291 | End: 2023-01-01
Attending: STUDENT IN AN ORGANIZED HEALTH CARE EDUCATION/TRAINING PROGRAM
Payer: MEDICARE

## 2023-01-01 ENCOUNTER — APPOINTMENT (OUTPATIENT)
Dept: PHYSICAL THERAPY | Facility: CLINIC | Age: 78
DRG: 193 | End: 2023-01-01
Attending: STUDENT IN AN ORGANIZED HEALTH CARE EDUCATION/TRAINING PROGRAM
Payer: MEDICARE

## 2023-01-01 ENCOUNTER — APPOINTMENT (OUTPATIENT)
Dept: NUCLEAR MEDICINE | Facility: CLINIC | Age: 78
End: 2023-01-01
Attending: EMERGENCY MEDICINE
Payer: MEDICARE

## 2023-01-01 ENCOUNTER — TELEPHONE (OUTPATIENT)
Dept: GERIATRICS | Facility: CLINIC | Age: 78
End: 2023-01-01

## 2023-01-01 ENCOUNTER — APPOINTMENT (OUTPATIENT)
Dept: CT IMAGING | Facility: CLINIC | Age: 78
DRG: 190 | End: 2023-01-01
Attending: EMERGENCY MEDICINE
Payer: MEDICARE

## 2023-01-01 ENCOUNTER — APPOINTMENT (OUTPATIENT)
Dept: RADIOLOGY | Facility: CLINIC | Age: 78
DRG: 193 | End: 2023-01-01
Attending: INTERNAL MEDICINE
Payer: MEDICARE

## 2023-01-01 ENCOUNTER — APPOINTMENT (OUTPATIENT)
Dept: RADIOLOGY | Facility: CLINIC | Age: 78
DRG: 388 | End: 2023-01-01
Attending: EMERGENCY MEDICINE
Payer: MEDICARE

## 2023-01-01 ENCOUNTER — APPOINTMENT (OUTPATIENT)
Dept: OCCUPATIONAL THERAPY | Facility: CLINIC | Age: 78
DRG: 193 | End: 2023-01-01
Payer: MEDICARE

## 2023-01-01 ENCOUNTER — OFFICE VISIT (OUTPATIENT)
Dept: INTERNAL MEDICINE | Facility: CLINIC | Age: 78
End: 2023-01-01
Payer: MEDICARE

## 2023-01-01 ENCOUNTER — TELEPHONE (OUTPATIENT)
Dept: RESPIRATORY THERAPY | Facility: CLINIC | Age: 78
End: 2023-01-01
Payer: MEDICARE

## 2023-01-01 ENCOUNTER — APPOINTMENT (OUTPATIENT)
Dept: MRI IMAGING | Facility: CLINIC | Age: 78
DRG: 193 | End: 2023-01-01
Attending: STUDENT IN AN ORGANIZED HEALTH CARE EDUCATION/TRAINING PROGRAM
Payer: MEDICARE

## 2023-01-01 ENCOUNTER — TELEPHONE (OUTPATIENT)
Dept: ONCOLOGY | Facility: CLINIC | Age: 78
End: 2023-01-01

## 2023-01-01 ENCOUNTER — LAB (OUTPATIENT)
Dept: CARDIOLOGY | Facility: CLINIC | Age: 78
End: 2023-01-01
Payer: MEDICARE

## 2023-01-01 ENCOUNTER — MEDICAL CORRESPONDENCE (OUTPATIENT)
Dept: HEALTH INFORMATION MANAGEMENT | Facility: CLINIC | Age: 78
End: 2023-01-01
Payer: MEDICARE

## 2023-01-01 ENCOUNTER — APPOINTMENT (OUTPATIENT)
Dept: RADIOLOGY | Facility: CLINIC | Age: 78
DRG: 291 | End: 2023-01-01
Attending: EMERGENCY MEDICINE
Payer: MEDICARE

## 2023-01-01 ENCOUNTER — APPOINTMENT (OUTPATIENT)
Dept: ULTRASOUND IMAGING | Facility: CLINIC | Age: 78
DRG: 190 | End: 2023-01-01
Attending: INTERNAL MEDICINE
Payer: MEDICARE

## 2023-01-01 ENCOUNTER — TELEPHONE (OUTPATIENT)
Dept: CARDIOLOGY | Facility: CLINIC | Age: 78
End: 2023-01-01

## 2023-01-01 ENCOUNTER — APPOINTMENT (OUTPATIENT)
Dept: RADIOLOGY | Facility: CLINIC | Age: 78
DRG: 291 | End: 2023-01-01
Attending: STUDENT IN AN ORGANIZED HEALTH CARE EDUCATION/TRAINING PROGRAM
Payer: MEDICARE

## 2023-01-01 ENCOUNTER — APPOINTMENT (OUTPATIENT)
Dept: ULTRASOUND IMAGING | Facility: CLINIC | Age: 78
End: 2023-01-01
Attending: EMERGENCY MEDICINE
Payer: MEDICARE

## 2023-01-01 ENCOUNTER — APPOINTMENT (OUTPATIENT)
Dept: CT IMAGING | Facility: CLINIC | Age: 78
DRG: 193 | End: 2023-01-01
Attending: STUDENT IN AN ORGANIZED HEALTH CARE EDUCATION/TRAINING PROGRAM
Payer: MEDICARE

## 2023-01-01 ENCOUNTER — HOSPITAL ENCOUNTER (INPATIENT)
Facility: CLINIC | Age: 78
LOS: 3 days | Discharge: NURSING, REHAB OR LONG-TERM CARE | DRG: 190 | End: 2023-10-28
Attending: EMERGENCY MEDICINE | Admitting: FAMILY MEDICINE
Payer: MEDICARE

## 2023-01-01 ENCOUNTER — APPOINTMENT (OUTPATIENT)
Dept: RADIOLOGY | Facility: CLINIC | Age: 78
DRG: 291 | End: 2023-01-01
Attending: FAMILY MEDICINE
Payer: MEDICARE

## 2023-01-01 ENCOUNTER — APPOINTMENT (OUTPATIENT)
Dept: RADIOLOGY | Facility: CLINIC | Age: 78
End: 2023-01-01
Attending: EMERGENCY MEDICINE
Payer: MEDICARE

## 2023-01-01 ENCOUNTER — APPOINTMENT (OUTPATIENT)
Dept: ULTRASOUND IMAGING | Facility: CLINIC | Age: 78
DRG: 291 | End: 2023-01-01
Payer: MEDICARE

## 2023-01-01 ENCOUNTER — PATIENT OUTREACH (OUTPATIENT)
Dept: CARE COORDINATION | Facility: CLINIC | Age: 78
End: 2023-01-01
Payer: MEDICARE

## 2023-01-01 ENCOUNTER — APPOINTMENT (OUTPATIENT)
Dept: OCCUPATIONAL THERAPY | Facility: CLINIC | Age: 78
DRG: 193 | End: 2023-01-01
Attending: STUDENT IN AN ORGANIZED HEALTH CARE EDUCATION/TRAINING PROGRAM
Payer: MEDICARE

## 2023-01-01 ENCOUNTER — VIRTUAL VISIT (OUTPATIENT)
Dept: RADIATION ONCOLOGY | Facility: HOSPITAL | Age: 78
End: 2023-01-01
Attending: INTERNAL MEDICINE
Payer: MEDICARE

## 2023-01-01 ENCOUNTER — APPOINTMENT (OUTPATIENT)
Dept: CT IMAGING | Facility: CLINIC | Age: 78
DRG: 193 | End: 2023-01-01
Attending: EMERGENCY MEDICINE
Payer: MEDICARE

## 2023-01-01 ENCOUNTER — VIRTUAL VISIT (OUTPATIENT)
Dept: CARDIOLOGY | Facility: CLINIC | Age: 78
End: 2023-01-01
Attending: NURSE PRACTITIONER
Payer: MEDICARE

## 2023-01-01 ENCOUNTER — APPOINTMENT (OUTPATIENT)
Dept: CT IMAGING | Facility: CLINIC | Age: 78
DRG: 291 | End: 2023-01-01
Payer: MEDICARE

## 2023-01-01 ENCOUNTER — APPOINTMENT (OUTPATIENT)
Dept: MRI IMAGING | Facility: CLINIC | Age: 78
DRG: 190 | End: 2023-01-01
Payer: MEDICARE

## 2023-01-01 ENCOUNTER — PATIENT OUTREACH (OUTPATIENT)
Dept: PALLIATIVE CARE | Facility: CLINIC | Age: 78
End: 2023-01-01
Payer: MEDICARE

## 2023-01-01 ENCOUNTER — APPOINTMENT (OUTPATIENT)
Dept: RADIOLOGY | Facility: CLINIC | Age: 78
DRG: 193 | End: 2023-01-01
Attending: EMERGENCY MEDICINE
Payer: MEDICARE

## 2023-01-01 ENCOUNTER — MYC MEDICAL ADVICE (OUTPATIENT)
Dept: INTERNAL MEDICINE | Facility: CLINIC | Age: 78
End: 2023-01-01
Payer: MEDICARE

## 2023-01-01 ENCOUNTER — TELEPHONE (OUTPATIENT)
Facility: CLINIC | Age: 78
End: 2023-01-01
Payer: MEDICARE

## 2023-01-01 ENCOUNTER — PATIENT OUTREACH (OUTPATIENT)
Dept: CARE COORDINATION | Facility: CLINIC | Age: 78
End: 2023-01-01

## 2023-01-01 ENCOUNTER — LAB (OUTPATIENT)
Dept: INFUSION THERAPY | Facility: CLINIC | Age: 78
End: 2023-01-01
Attending: INTERNAL MEDICINE
Payer: MEDICARE

## 2023-01-01 ENCOUNTER — HOSPITAL ENCOUNTER (OUTPATIENT)
Dept: PET IMAGING | Facility: HOSPITAL | Age: 78
Discharge: HOME | End: 2023-08-11
Attending: INTERNAL MEDICINE
Payer: MEDICARE

## 2023-01-01 ENCOUNTER — APPOINTMENT (OUTPATIENT)
Dept: NUCLEAR MEDICINE | Facility: CLINIC | Age: 78
DRG: 193 | End: 2023-01-01
Attending: INTERNAL MEDICINE
Payer: MEDICARE

## 2023-01-01 ENCOUNTER — TELEPHONE (OUTPATIENT)
Dept: RADIATION ONCOLOGY | Facility: HOSPITAL | Age: 78
End: 2023-01-01

## 2023-01-01 ENCOUNTER — HOSPITAL ENCOUNTER (INPATIENT)
Facility: CLINIC | Age: 78
LOS: 2 days | Discharge: HOME | DRG: 193 | End: 2023-07-05
Attending: EMERGENCY MEDICINE
Payer: MEDICARE

## 2023-01-01 ENCOUNTER — HOSPITAL ENCOUNTER (OUTPATIENT)
Facility: HOSPITAL | Age: 78
End: 2023-01-01
Payer: MEDICARE

## 2023-01-01 ENCOUNTER — HOSPITAL ENCOUNTER (INPATIENT)
Facility: CLINIC | Age: 78
LOS: 8 days | Discharge: HOME HEALTH | DRG: 193 | End: 2023-06-25
Attending: EMERGENCY MEDICINE | Admitting: STUDENT IN AN ORGANIZED HEALTH CARE EDUCATION/TRAINING PROGRAM
Payer: MEDICARE

## 2023-01-01 ENCOUNTER — APPOINTMENT (OUTPATIENT)
Dept: PHYSICAL THERAPY | Facility: CLINIC | Age: 78
DRG: 193 | End: 2023-01-01
Attending: INTERNAL MEDICINE
Payer: MEDICARE

## 2023-01-01 ENCOUNTER — HOSPITAL ENCOUNTER (INPATIENT)
Facility: CLINIC | Age: 78
LOS: 12 days | Discharge: NURSING, REHAB OR LONG-TERM CARE | DRG: 291 | End: 2023-10-05
Attending: STUDENT IN AN ORGANIZED HEALTH CARE EDUCATION/TRAINING PROGRAM | Admitting: FAMILY MEDICINE
Payer: MEDICARE

## 2023-01-01 ENCOUNTER — APPOINTMENT (OUTPATIENT)
Dept: CT IMAGING | Facility: CLINIC | Age: 78
DRG: 388 | End: 2023-01-01
Attending: EMERGENCY MEDICINE
Payer: MEDICARE

## 2023-01-01 ENCOUNTER — APPOINTMENT (OUTPATIENT)
Dept: OCCUPATIONAL THERAPY | Facility: CLINIC | Age: 78
DRG: 291 | End: 2023-01-01
Attending: INTERNAL MEDICINE
Payer: MEDICARE

## 2023-01-01 ENCOUNTER — APPOINTMENT (OUTPATIENT)
Dept: RADIOLOGY | Facility: CLINIC | Age: 78
DRG: 388 | End: 2023-01-01
Attending: PHYSICIAN ASSISTANT
Payer: MEDICARE

## 2023-01-01 ENCOUNTER — HOSPITAL ENCOUNTER (INPATIENT)
Facility: CLINIC | Age: 78
LOS: 2 days | DRG: 388 | End: 2023-11-04
Attending: EMERGENCY MEDICINE | Admitting: STUDENT IN AN ORGANIZED HEALTH CARE EDUCATION/TRAINING PROGRAM
Payer: MEDICARE

## 2023-01-01 ENCOUNTER — VIRTUAL VISIT (OUTPATIENT)
Dept: INTERNAL MEDICINE | Facility: CLINIC | Age: 78
End: 2023-01-01
Payer: MEDICARE

## 2023-01-01 VITALS
HEART RATE: 76 BPM | TEMPERATURE: 98 F | HEIGHT: 59 IN | WEIGHT: 114 LBS | SYSTOLIC BLOOD PRESSURE: 126 MMHG | RESPIRATION RATE: 16 BRPM | DIASTOLIC BLOOD PRESSURE: 58 MMHG | OXYGEN SATURATION: 91 % | BODY MASS INDEX: 22.98 KG/M2

## 2023-01-01 VITALS
TEMPERATURE: 98 F | RESPIRATION RATE: 26 BRPM | BODY MASS INDEX: 19.15 KG/M2 | SYSTOLIC BLOOD PRESSURE: 145 MMHG | HEART RATE: 86 BPM | OXYGEN SATURATION: 90 % | WEIGHT: 104.72 LBS | DIASTOLIC BLOOD PRESSURE: 67 MMHG

## 2023-01-01 VITALS
DIASTOLIC BLOOD PRESSURE: 63 MMHG | HEIGHT: 59 IN | WEIGHT: 112.2 LBS | RESPIRATION RATE: 20 BRPM | OXYGEN SATURATION: 93 % | HEART RATE: 70 BPM | SYSTOLIC BLOOD PRESSURE: 126 MMHG | TEMPERATURE: 97.7 F | BODY MASS INDEX: 22.62 KG/M2

## 2023-01-01 VITALS
HEIGHT: 62 IN | RESPIRATION RATE: 18 BRPM | BODY MASS INDEX: 20.06 KG/M2 | WEIGHT: 109 LBS | TEMPERATURE: 97.9 F | DIASTOLIC BLOOD PRESSURE: 72 MMHG | HEART RATE: 75 BPM | SYSTOLIC BLOOD PRESSURE: 137 MMHG | OXYGEN SATURATION: 95 %

## 2023-01-01 VITALS
OXYGEN SATURATION: 94 % | HEART RATE: 68 BPM | SYSTOLIC BLOOD PRESSURE: 114 MMHG | TEMPERATURE: 97.9 F | HEIGHT: 59 IN | BODY MASS INDEX: 22.44 KG/M2 | WEIGHT: 111.33 LBS | RESPIRATION RATE: 18 BRPM | DIASTOLIC BLOOD PRESSURE: 55 MMHG

## 2023-01-01 VITALS
BODY MASS INDEX: 22.42 KG/M2 | WEIGHT: 111 LBS | RESPIRATION RATE: 44 BRPM | HEART RATE: 116 BPM | OXYGEN SATURATION: 91 % | SYSTOLIC BLOOD PRESSURE: 118 MMHG | DIASTOLIC BLOOD PRESSURE: 84 MMHG | TEMPERATURE: 97.2 F

## 2023-01-01 VITALS — WEIGHT: 112 LBS | HEIGHT: 59 IN | BODY MASS INDEX: 22.58 KG/M2

## 2023-01-01 VITALS
WEIGHT: 112.5 LBS | RESPIRATION RATE: 18 BRPM | HEART RATE: 71 BPM | OXYGEN SATURATION: 91 % | BODY MASS INDEX: 22.68 KG/M2 | HEIGHT: 59 IN | SYSTOLIC BLOOD PRESSURE: 164 MMHG | TEMPERATURE: 98.1 F | DIASTOLIC BLOOD PRESSURE: 67 MMHG

## 2023-01-01 VITALS — BODY MASS INDEX: 22.38 KG/M2 | WEIGHT: 111 LBS | HEIGHT: 59 IN

## 2023-01-01 VITALS
SYSTOLIC BLOOD PRESSURE: 137 MMHG | HEART RATE: 75 BPM | BODY MASS INDEX: 20.43 KG/M2 | DIASTOLIC BLOOD PRESSURE: 72 MMHG | TEMPERATURE: 97.9 F | WEIGHT: 111 LBS | HEIGHT: 62 IN | OXYGEN SATURATION: 98 % | RESPIRATION RATE: 18 BRPM

## 2023-01-01 VITALS
WEIGHT: 114 LBS | HEIGHT: 62 IN | SYSTOLIC BLOOD PRESSURE: 119 MMHG | OXYGEN SATURATION: 93 % | RESPIRATION RATE: 18 BRPM | TEMPERATURE: 98.1 F | BODY MASS INDEX: 20.98 KG/M2 | DIASTOLIC BLOOD PRESSURE: 69 MMHG | HEART RATE: 83 BPM

## 2023-01-01 VITALS
WEIGHT: 111 LBS | HEART RATE: 43 BPM | OXYGEN SATURATION: 97 % | HEIGHT: 59 IN | RESPIRATION RATE: 18 BRPM | HEIGHT: 59 IN | SYSTOLIC BLOOD PRESSURE: 110 MMHG | WEIGHT: 110 LBS | BODY MASS INDEX: 22.38 KG/M2 | BODY MASS INDEX: 22.18 KG/M2 | DIASTOLIC BLOOD PRESSURE: 70 MMHG | TEMPERATURE: 98.1 F

## 2023-01-01 VITALS
HEART RATE: 80 BPM | TEMPERATURE: 98.4 F | OXYGEN SATURATION: 93 % | BODY MASS INDEX: 22.22 KG/M2 | SYSTOLIC BLOOD PRESSURE: 106 MMHG | BODY MASS INDEX: 20.98 KG/M2 | SYSTOLIC BLOOD PRESSURE: 121 MMHG | DIASTOLIC BLOOD PRESSURE: 68 MMHG | DIASTOLIC BLOOD PRESSURE: 69 MMHG | WEIGHT: 114 LBS | HEIGHT: 59 IN | RESPIRATION RATE: 18 BRPM | HEIGHT: 62 IN | TEMPERATURE: 97.6 F | RESPIRATION RATE: 18 BRPM | OXYGEN SATURATION: 93 % | WEIGHT: 110.23 LBS | HEART RATE: 75 BPM

## 2023-01-01 VITALS
SYSTOLIC BLOOD PRESSURE: 137 MMHG | HEIGHT: 62 IN | WEIGHT: 110 LBS | OXYGEN SATURATION: 98 % | HEART RATE: 75 BPM | DIASTOLIC BLOOD PRESSURE: 72 MMHG | BODY MASS INDEX: 20.24 KG/M2 | TEMPERATURE: 97.9 F | RESPIRATION RATE: 18 BRPM

## 2023-01-01 VITALS
BODY MASS INDEX: 23.14 KG/M2 | HEART RATE: 75 BPM | TEMPERATURE: 98.2 F | SYSTOLIC BLOOD PRESSURE: 129 MMHG | WEIGHT: 114.8 LBS | DIASTOLIC BLOOD PRESSURE: 65 MMHG | OXYGEN SATURATION: 94 % | RESPIRATION RATE: 18 BRPM

## 2023-01-01 VITALS
TEMPERATURE: 97.5 F | OXYGEN SATURATION: 97 % | HEIGHT: 62 IN | SYSTOLIC BLOOD PRESSURE: 118 MMHG | HEART RATE: 70 BPM | WEIGHT: 105 LBS | DIASTOLIC BLOOD PRESSURE: 72 MMHG | BODY MASS INDEX: 19.32 KG/M2 | RESPIRATION RATE: 18 BRPM

## 2023-01-01 VITALS — WEIGHT: 107 LBS | BODY MASS INDEX: 21.57 KG/M2 | HEIGHT: 59 IN

## 2023-01-01 VITALS
WEIGHT: 114 LBS | SYSTOLIC BLOOD PRESSURE: 137 MMHG | HEART RATE: 75 BPM | HEIGHT: 62 IN | RESPIRATION RATE: 18 BRPM | OXYGEN SATURATION: 97 % | TEMPERATURE: 97.9 F | BODY MASS INDEX: 20.98 KG/M2 | DIASTOLIC BLOOD PRESSURE: 72 MMHG

## 2023-01-01 VITALS
WEIGHT: 108.5 LBS | OXYGEN SATURATION: 92 % | BODY MASS INDEX: 21.87 KG/M2 | HEART RATE: 59 BPM | HEIGHT: 59 IN | DIASTOLIC BLOOD PRESSURE: 80 MMHG | SYSTOLIC BLOOD PRESSURE: 144 MMHG | RESPIRATION RATE: 16 BRPM

## 2023-01-01 VITALS
OXYGEN SATURATION: 90 % | BODY MASS INDEX: 22.8 KG/M2 | SYSTOLIC BLOOD PRESSURE: 110 MMHG | RESPIRATION RATE: 16 BRPM | HEART RATE: 73 BPM | HEIGHT: 59 IN | WEIGHT: 113.1 LBS | DIASTOLIC BLOOD PRESSURE: 51 MMHG | TEMPERATURE: 98.1 F

## 2023-01-01 VITALS — HEIGHT: 59 IN | WEIGHT: 107 LBS | BODY MASS INDEX: 21.57 KG/M2

## 2023-01-01 VITALS
OXYGEN SATURATION: 95 % | HEART RATE: 75 BPM | HEIGHT: 59 IN | BODY MASS INDEX: 22.8 KG/M2 | SYSTOLIC BLOOD PRESSURE: 117 MMHG | DIASTOLIC BLOOD PRESSURE: 58 MMHG

## 2023-01-01 VITALS — BODY MASS INDEX: 21.97 KG/M2 | WEIGHT: 109 LBS | HEIGHT: 59 IN

## 2023-01-01 VITALS
HEART RATE: 69 BPM | WEIGHT: 104 LBS | BODY MASS INDEX: 21.01 KG/M2 | DIASTOLIC BLOOD PRESSURE: 54 MMHG | OXYGEN SATURATION: 98 % | RESPIRATION RATE: 20 BRPM | SYSTOLIC BLOOD PRESSURE: 102 MMHG

## 2023-01-01 DIAGNOSIS — J44.1 COPD EXACERBATION (H): ICD-10-CM

## 2023-01-01 DIAGNOSIS — J43.9 PULMONARY EMPHYSEMA, UNSPECIFIED EMPHYSEMA TYPE (H): ICD-10-CM

## 2023-01-01 DIAGNOSIS — R41.0 DELIRIUM: ICD-10-CM

## 2023-01-01 DIAGNOSIS — C34.11 MALIGNANT NEOPLASM OF UPPER LOBE OF RIGHT LUNG (H): ICD-10-CM

## 2023-01-01 DIAGNOSIS — I73.9 PAD (PERIPHERAL ARTERY DISEASE) (H): ICD-10-CM

## 2023-01-01 DIAGNOSIS — E78.00 HYPERCHOLESTEROLEMIA: ICD-10-CM

## 2023-01-01 DIAGNOSIS — J44.9 CHRONIC OBSTRUCTIVE PULMONARY DISEASE, UNSPECIFIED COPD TYPE (H): ICD-10-CM

## 2023-01-01 DIAGNOSIS — T45.1X5A HOT FLASHES DUE TO TAMOXIFEN: ICD-10-CM

## 2023-01-01 DIAGNOSIS — J43.9 PULMONARY EMPHYSEMA, UNSPECIFIED EMPHYSEMA TYPE (H): Primary | ICD-10-CM

## 2023-01-01 DIAGNOSIS — D72.829 LEUKOCYTOSIS, UNSPECIFIED TYPE: ICD-10-CM

## 2023-01-01 DIAGNOSIS — J18.9 PNEUMONIA DUE TO INFECTIOUS ORGANISM, UNSPECIFIED LATERALITY, UNSPECIFIED PART OF LUNG: ICD-10-CM

## 2023-01-01 DIAGNOSIS — C50.912 INVASIVE DUCTAL CARCINOMA OF BREAST, FEMALE, LEFT (H): ICD-10-CM

## 2023-01-01 DIAGNOSIS — J44.9 CHRONIC OBSTRUCTIVE PULMONARY DISEASE, UNSPECIFIED (H): ICD-10-CM

## 2023-01-01 DIAGNOSIS — I42.8 NONISCHEMIC CARDIOMYOPATHY (H): Primary | ICD-10-CM

## 2023-01-01 DIAGNOSIS — I50.33 ACUTE ON CHRONIC DIASTOLIC CONGESTIVE HEART FAILURE (H): ICD-10-CM

## 2023-01-01 DIAGNOSIS — N18.4 CHRONIC KIDNEY DISEASE, STAGE IV (SEVERE) (H): ICD-10-CM

## 2023-01-01 DIAGNOSIS — J96.01 ACUTE RESPIRATORY FAILURE WITH HYPOXIA (H): Primary | ICD-10-CM

## 2023-01-01 DIAGNOSIS — G89.4 CHRONIC PAIN SYNDROME: Primary | ICD-10-CM

## 2023-01-01 DIAGNOSIS — R35.0 URINARY FREQUENCY: ICD-10-CM

## 2023-01-01 DIAGNOSIS — R22.2 MASS ON BACK: Primary | ICD-10-CM

## 2023-01-01 DIAGNOSIS — G57.00 LESION OF SCIATIC NERVE, UNSPECIFIED LATERALITY: ICD-10-CM

## 2023-01-01 DIAGNOSIS — I42.8 NONISCHEMIC CARDIOMYOPATHY (H): ICD-10-CM

## 2023-01-01 DIAGNOSIS — F41.8 DEPRESSION WITH ANXIETY: ICD-10-CM

## 2023-01-01 DIAGNOSIS — M54.50 CHRONIC BILATERAL LOW BACK PAIN WITHOUT SCIATICA: ICD-10-CM

## 2023-01-01 DIAGNOSIS — R91.8 MASS OF UPPER LOBE OF RIGHT LUNG: ICD-10-CM

## 2023-01-01 DIAGNOSIS — G89.29 CHRONIC BILATERAL LOW BACK PAIN WITHOUT SCIATICA: ICD-10-CM

## 2023-01-01 DIAGNOSIS — R91.1 RIGHT UPPER LOBE PULMONARY NODULE: ICD-10-CM

## 2023-01-01 DIAGNOSIS — Z09 HOSPITAL DISCHARGE FOLLOW-UP: ICD-10-CM

## 2023-01-01 DIAGNOSIS — Z86.73 HISTORY OF STROKE: ICD-10-CM

## 2023-01-01 DIAGNOSIS — N64.89 BREAST ASYMMETRY: ICD-10-CM

## 2023-01-01 DIAGNOSIS — C34.11 MALIGNANT NEOPLASM OF UPPER LOBE OF RIGHT LUNG (H): Primary | ICD-10-CM

## 2023-01-01 DIAGNOSIS — Z71.89 GOALS OF CARE, COUNSELING/DISCUSSION: ICD-10-CM

## 2023-01-01 DIAGNOSIS — J96.21 ACUTE AND CHRONIC RESPIRATORY FAILURE WITH HYPOXIA (H): Primary | ICD-10-CM

## 2023-01-01 DIAGNOSIS — I10 ESSENTIAL HYPERTENSION: Primary | ICD-10-CM

## 2023-01-01 DIAGNOSIS — S22.080S COMPRESSION FRACTURE OF T12 VERTEBRA, SEQUELA: Primary | ICD-10-CM

## 2023-01-01 DIAGNOSIS — R91.8 LUNG MASS: ICD-10-CM

## 2023-01-01 DIAGNOSIS — I50.33 ACUTE ON CHRONIC DIASTOLIC (CONGESTIVE) HEART FAILURE (H): ICD-10-CM

## 2023-01-01 DIAGNOSIS — I50.42 CHRONIC COMBINED SYSTOLIC AND DIASTOLIC CONGESTIVE HEART FAILURE (H): Primary | ICD-10-CM

## 2023-01-01 DIAGNOSIS — J43.1 PANLOBULAR EMPHYSEMA (H): ICD-10-CM

## 2023-01-01 DIAGNOSIS — G89.4 CHRONIC PAIN SYNDROME: ICD-10-CM

## 2023-01-01 DIAGNOSIS — I10 BENIGN ESSENTIAL HYPERTENSION: ICD-10-CM

## 2023-01-01 DIAGNOSIS — Z17.0 MALIGNANT NEOPLASM OF CENTRAL PORTION OF LEFT BREAST IN FEMALE, ESTROGEN RECEPTOR POSITIVE (H): ICD-10-CM

## 2023-01-01 DIAGNOSIS — D68.51 FACTOR 5 LEIDEN MUTATION, HETEROZYGOUS (H): ICD-10-CM

## 2023-01-01 DIAGNOSIS — J96.21 ACUTE AND CHRONIC RESPIRATORY FAILURE WITH HYPOXIA (H): ICD-10-CM

## 2023-01-01 DIAGNOSIS — I50.23 HEART FAILURE, SYSTOLIC, ACUTE ON CHRONIC (H): ICD-10-CM

## 2023-01-01 DIAGNOSIS — S22.080D WEDGE COMPRESSION FRACTURE OF T11-T12 VERTEBRA, SUBSEQUENT ENCOUNTER FOR FRACTURE WITH ROUTINE HEALING: ICD-10-CM

## 2023-01-01 DIAGNOSIS — I25.84 CORONARY ARTERY DISEASE DUE TO CALCIFIED CORONARY LESION: ICD-10-CM

## 2023-01-01 DIAGNOSIS — I50.22 CHRONIC SYSTOLIC CONGESTIVE HEART FAILURE (H): ICD-10-CM

## 2023-01-01 DIAGNOSIS — I25.10 CORONARY ARTERY DISEASE DUE TO CALCIFIED CORONARY LESION: ICD-10-CM

## 2023-01-01 DIAGNOSIS — T14.8XXA HEMATOMA OF SKIN: ICD-10-CM

## 2023-01-01 DIAGNOSIS — I50.20 HEART FAILURE WITH REDUCED EJECTION FRACTION (H): Primary | ICD-10-CM

## 2023-01-01 DIAGNOSIS — N18.30 CHRONIC RENAL INSUFFICIENCY, STAGE 3 (MODERATE) (H): ICD-10-CM

## 2023-01-01 DIAGNOSIS — I50.20 HEART FAILURE WITH REDUCED EJECTION FRACTION (H): ICD-10-CM

## 2023-01-01 DIAGNOSIS — R26.9 GAIT DISORDER: ICD-10-CM

## 2023-01-01 DIAGNOSIS — G47.01 INSOMNIA DUE TO MEDICAL CONDITION: ICD-10-CM

## 2023-01-01 DIAGNOSIS — Z51.5 ENCOUNTER FOR PALLIATIVE CARE: ICD-10-CM

## 2023-01-01 DIAGNOSIS — I50.9 ACUTE DECOMPENSATED HEART FAILURE (H): Primary | ICD-10-CM

## 2023-01-01 DIAGNOSIS — E27.8 ADRENAL MASS (H): ICD-10-CM

## 2023-01-01 DIAGNOSIS — N63.20 MASS OF LEFT BREAST, UNSPECIFIED QUADRANT: ICD-10-CM

## 2023-01-01 DIAGNOSIS — K21.00 GASTROESOPHAGEAL REFLUX DISEASE WITH ESOPHAGITIS WITHOUT HEMORRHAGE: ICD-10-CM

## 2023-01-01 DIAGNOSIS — Z01.812 PRE-PROCEDURE LAB EXAM: ICD-10-CM

## 2023-01-01 DIAGNOSIS — I48.20 CHRONIC ATRIAL FIBRILLATION (H): ICD-10-CM

## 2023-01-01 DIAGNOSIS — I42.9 CARDIOMYOPATHY, IDIOPATHIC (H): ICD-10-CM

## 2023-01-01 DIAGNOSIS — R63.4 WEIGHT LOSS: ICD-10-CM

## 2023-01-01 DIAGNOSIS — N63.20 MASS OF LEFT BREAST, UNSPECIFIED QUADRANT: Primary | ICD-10-CM

## 2023-01-01 DIAGNOSIS — J44.1 COPD WITH ACUTE EXACERBATION (H): ICD-10-CM

## 2023-01-01 DIAGNOSIS — I50.42 CHRONIC COMBINED SYSTOLIC AND DIASTOLIC CONGESTIVE HEART FAILURE (H): ICD-10-CM

## 2023-01-01 DIAGNOSIS — Z51.11 ENCOUNTER FOR ANTINEOPLASTIC CHEMOTHERAPY: ICD-10-CM

## 2023-01-01 DIAGNOSIS — N17.9 AKI (ACUTE KIDNEY INJURY) (H): ICD-10-CM

## 2023-01-01 DIAGNOSIS — R91.1 NODULE OF UPPER LOBE OF RIGHT LUNG: ICD-10-CM

## 2023-01-01 DIAGNOSIS — C79.31 MALIGNANT NEOPLASM METASTATIC TO BRAIN (H): ICD-10-CM

## 2023-01-01 DIAGNOSIS — G57.02 PYRIFORMIS SYNDROME, LEFT: ICD-10-CM

## 2023-01-01 DIAGNOSIS — S22.080D COMPRESSION FRACTURE OF T12 VERTEBRA WITH ROUTINE HEALING, SUBSEQUENT ENCOUNTER: ICD-10-CM

## 2023-01-01 DIAGNOSIS — C50.112 MALIGNANT NEOPLASM OF CENTRAL PORTION OF LEFT BREAST IN FEMALE, ESTROGEN RECEPTOR POSITIVE (H): ICD-10-CM

## 2023-01-01 DIAGNOSIS — B37.2 YEAST DERMATITIS: ICD-10-CM

## 2023-01-01 DIAGNOSIS — R59.1 LYMPHADENOPATHY: ICD-10-CM

## 2023-01-01 DIAGNOSIS — I45.81 LONG Q-T SYNDROME: Primary | ICD-10-CM

## 2023-01-01 DIAGNOSIS — I45.81 LONG Q-T SYNDROME: ICD-10-CM

## 2023-01-01 DIAGNOSIS — R07.89 CHEST DISCOMFORT: ICD-10-CM

## 2023-01-01 DIAGNOSIS — R63.0 ANOREXIA: ICD-10-CM

## 2023-01-01 DIAGNOSIS — G89.29 OTHER CHRONIC PAIN: Primary | ICD-10-CM

## 2023-01-01 DIAGNOSIS — Z51.81 MEDICATION MONITORING ENCOUNTER: ICD-10-CM

## 2023-01-01 DIAGNOSIS — Z03.89 ENCOUNTER FOR OBSERVATION FOR OTHER SUSPECTED DISEASES AND CONDITIONS RULED OUT: ICD-10-CM

## 2023-01-01 DIAGNOSIS — J45.21 EXACERBATION OF INTERMITTENT ASTHMA, UNSPECIFIED ASTHMA SEVERITY: ICD-10-CM

## 2023-01-01 DIAGNOSIS — F41.9 ANXIETY: ICD-10-CM

## 2023-01-01 DIAGNOSIS — M54.42 ACUTE LEFT-SIDED LOW BACK PAIN WITH LEFT-SIDED SCIATICA: ICD-10-CM

## 2023-01-01 DIAGNOSIS — J96.21 ACUTE ON CHRONIC RESPIRATORY FAILURE WITH HYPOXIA (H): ICD-10-CM

## 2023-01-01 DIAGNOSIS — J18.9 ACUTE PNEUMONIA: ICD-10-CM

## 2023-01-01 DIAGNOSIS — I50.9 ACUTE DECOMPENSATED HEART FAILURE (H): ICD-10-CM

## 2023-01-01 DIAGNOSIS — J44.1 COPD EXACERBATION (H): Primary | ICD-10-CM

## 2023-01-01 DIAGNOSIS — R94.31 LONG QT INTERVAL: ICD-10-CM

## 2023-01-01 DIAGNOSIS — Z51.5 ENCOUNTER FOR PALLIATIVE CARE: Primary | ICD-10-CM

## 2023-01-01 DIAGNOSIS — I10 ESSENTIAL HYPERTENSION: ICD-10-CM

## 2023-01-01 DIAGNOSIS — S22.080S T12 COMPRESSION FRACTURE, SEQUELA: ICD-10-CM

## 2023-01-01 DIAGNOSIS — R23.2 HOT FLASHES DUE TO TAMOXIFEN: ICD-10-CM

## 2023-01-01 DIAGNOSIS — I25.10 ATHEROSCLEROTIC HEART DISEASE OF NATIVE CORONARY ARTERY WITHOUT ANGINA PECTORIS: ICD-10-CM

## 2023-01-01 DIAGNOSIS — C34.90 MALIGNANT NEOPLASM OF LUNG, UNSPECIFIED LATERALITY, UNSPECIFIED PART OF LUNG (H): ICD-10-CM

## 2023-01-01 DIAGNOSIS — R60.0 PEDAL EDEMA: ICD-10-CM

## 2023-01-01 DIAGNOSIS — C34.90 PRIMARY MALIGNANT NEOPLASM OF LUNG METASTATIC TO OTHER SITE, UNSPECIFIED LATERALITY (H): ICD-10-CM

## 2023-01-01 DIAGNOSIS — I50.9 ACUTE ON CHRONIC CONGESTIVE HEART FAILURE, UNSPECIFIED HEART FAILURE TYPE (H): ICD-10-CM

## 2023-01-01 DIAGNOSIS — J18.9 PNEUMONIA OF LEFT UPPER LOBE DUE TO INFECTIOUS ORGANISM: ICD-10-CM

## 2023-01-01 DIAGNOSIS — F32.9 MAJOR DEPRESSIVE DISORDER WITH CURRENT ACTIVE EPISODE, UNSPECIFIED DEPRESSION EPISODE SEVERITY, UNSPECIFIED WHETHER RECURRENT: Primary | ICD-10-CM

## 2023-01-01 DIAGNOSIS — L30.4 INTERTRIGO: ICD-10-CM

## 2023-01-01 DIAGNOSIS — D49.1 NEOPLASM OF UPPER LOBE OF RIGHT LUNG: ICD-10-CM

## 2023-01-01 DIAGNOSIS — G93.6 CEREBRAL EDEMA (H): Primary | ICD-10-CM

## 2023-01-01 DIAGNOSIS — R91.8 PULMONARY NODULES: Primary | ICD-10-CM

## 2023-01-01 DIAGNOSIS — Z53.9 DIAGNOSIS NOT YET DEFINED: Primary | ICD-10-CM

## 2023-01-01 DIAGNOSIS — R06.02 SHORTNESS OF BREATH: ICD-10-CM

## 2023-01-01 DIAGNOSIS — Z51.5 PALLIATIVE CARE PATIENT: Primary | ICD-10-CM

## 2023-01-01 DIAGNOSIS — D64.9 CHRONIC ANEMIA: ICD-10-CM

## 2023-01-01 DIAGNOSIS — J30.2 SEASONAL ALLERGIC RHINITIS, UNSPECIFIED TRIGGER: ICD-10-CM

## 2023-01-01 DIAGNOSIS — K56.609 SMALL BOWEL OBSTRUCTION (H): ICD-10-CM

## 2023-01-01 DIAGNOSIS — C34.90 MALIGNANT NEOPLASM OF LUNG, UNSPECIFIED LATERALITY, UNSPECIFIED PART OF LUNG (H): Primary | ICD-10-CM

## 2023-01-01 DIAGNOSIS — C50.912 INVASIVE DUCTAL CARCINOMA OF BREAST, FEMALE, LEFT (H): Primary | ICD-10-CM

## 2023-01-01 DIAGNOSIS — J18.9 PNEUMONIA OF LEFT LOWER LOBE DUE TO INFECTIOUS ORGANISM: ICD-10-CM

## 2023-01-01 DIAGNOSIS — Z85.3 HISTORY OF BREAST CANCER: ICD-10-CM

## 2023-01-01 DIAGNOSIS — R09.02 HYPOXIA: ICD-10-CM

## 2023-01-01 DIAGNOSIS — S81.809D MULTIPLE OPENS WOUND OF LOWER EXTREMITY, UNSPECIFIED LATERALITY, SUBSEQUENT ENCOUNTER: ICD-10-CM

## 2023-01-01 DIAGNOSIS — S22.080S COMPRESSION FRACTURE OF T12 VERTEBRA, SEQUELA: ICD-10-CM

## 2023-01-01 DIAGNOSIS — R10.11 RIGHT UPPER QUADRANT PAIN: ICD-10-CM

## 2023-01-01 LAB
ALBUMIN SERPL BCG-MCNC: 2.8 G/DL (ref 3.5–5.2)
ALBUMIN SERPL BCG-MCNC: 2.9 G/DL (ref 3.5–5.2)
ALBUMIN SERPL BCG-MCNC: 3 G/DL (ref 3.5–5.2)
ALBUMIN SERPL BCG-MCNC: 3 G/DL (ref 3.5–5.2)
ALBUMIN SERPL BCG-MCNC: 3.3 G/DL (ref 3.5–5.2)
ALBUMIN SERPL BCG-MCNC: 3.4 G/DL (ref 3.5–5.2)
ALBUMIN SERPL BCG-MCNC: 3.5 G/DL (ref 3.5–5.2)
ALBUMIN SERPL-MCNC: 2.9 G/DL (ref 3.5–5)
ALBUMIN SERPL-MCNC: 3.1 G/DL (ref 3.5–5)
ALBUMIN SERPL-MCNC: 3.2 G/DL (ref 3.5–5)
ALBUMIN UR-MCNC: 10 MG/DL
ALBUMIN UR-MCNC: 10 MG/DL
ALBUMIN UR-MCNC: NEGATIVE MG/DL
ALP SERPL-CCNC: 36 U/L (ref 35–104)
ALP SERPL-CCNC: 40 U/L (ref 35–104)
ALP SERPL-CCNC: 46 U/L (ref 35–104)
ALP SERPL-CCNC: 47 U/L (ref 35–104)
ALP SERPL-CCNC: 49 U/L (ref 35–104)
ALP SERPL-CCNC: 50 U/L (ref 35–104)
ALP SERPL-CCNC: 54 U/L (ref 35–104)
ALP SERPL-CCNC: 54 U/L (ref 35–104)
ALP SERPL-CCNC: 57 U/L (ref 45–120)
ALP SERPL-CCNC: 59 U/L (ref 45–120)
ALP SERPL-CCNC: 62 U/L (ref 35–104)
ALP SERPL-CCNC: 71 U/L (ref 45–120)
ALT SERPL W P-5'-P-CCNC: 10 U/L (ref 0–45)
ALT SERPL W P-5'-P-CCNC: 5 U/L (ref 0–50)
ALT SERPL W P-5'-P-CCNC: 6 U/L (ref 0–50)
ALT SERPL W P-5'-P-CCNC: 8 U/L (ref 0–50)
ALT SERPL W P-5'-P-CCNC: <5 U/L (ref 0–50)
ALT SERPL W P-5'-P-CCNC: <9 U/L (ref 0–45)
ALT SERPL W P-5'-P-CCNC: <9 U/L (ref 0–45)
ANION GAP SERPL CALCULATED.3IONS-SCNC: 10 MMOL/L (ref 5–18)
ANION GAP SERPL CALCULATED.3IONS-SCNC: 10 MMOL/L (ref 7–15)
ANION GAP SERPL CALCULATED.3IONS-SCNC: 11 MMOL/L (ref 5–18)
ANION GAP SERPL CALCULATED.3IONS-SCNC: 11 MMOL/L (ref 7–15)
ANION GAP SERPL CALCULATED.3IONS-SCNC: 12 MMOL/L (ref 5–18)
ANION GAP SERPL CALCULATED.3IONS-SCNC: 12 MMOL/L (ref 5–18)
ANION GAP SERPL CALCULATED.3IONS-SCNC: 12 MMOL/L (ref 7–15)
ANION GAP SERPL CALCULATED.3IONS-SCNC: 13 MMOL/L (ref 5–18)
ANION GAP SERPL CALCULATED.3IONS-SCNC: 13 MMOL/L (ref 7–15)
ANION GAP SERPL CALCULATED.3IONS-SCNC: 14 MMOL/L (ref 7–15)
ANION GAP SERPL CALCULATED.3IONS-SCNC: 15 MMOL/L (ref 5–18)
ANION GAP SERPL CALCULATED.3IONS-SCNC: 15 MMOL/L (ref 7–15)
ANION GAP SERPL CALCULATED.3IONS-SCNC: 5 MMOL/L (ref 7–15)
ANION GAP SERPL CALCULATED.3IONS-SCNC: 6 MMOL/L (ref 5–18)
ANION GAP SERPL CALCULATED.3IONS-SCNC: 6 MMOL/L (ref 7–15)
ANION GAP SERPL CALCULATED.3IONS-SCNC: 6 MMOL/L (ref 7–15)
ANION GAP SERPL CALCULATED.3IONS-SCNC: 7 MMOL/L (ref 5–18)
ANION GAP SERPL CALCULATED.3IONS-SCNC: 7 MMOL/L (ref 7–15)
ANION GAP SERPL CALCULATED.3IONS-SCNC: 8 MMOL/L (ref 5–18)
ANION GAP SERPL CALCULATED.3IONS-SCNC: 8 MMOL/L (ref 7–15)
ANION GAP SERPL CALCULATED.3IONS-SCNC: 9 MMOL/L (ref 5–18)
ANION GAP SERPL CALCULATED.3IONS-SCNC: 9 MMOL/L (ref 7–15)
APPEARANCE UR: CLEAR
AST SERPL W P-5'-P-CCNC: 18 U/L (ref 0–40)
AST SERPL W P-5'-P-CCNC: 21 U/L (ref 0–40)
AST SERPL W P-5'-P-CCNC: 21 U/L (ref 0–45)
AST SERPL W P-5'-P-CCNC: 22 U/L (ref 0–45)
AST SERPL W P-5'-P-CCNC: 23 U/L (ref 0–40)
AST SERPL W P-5'-P-CCNC: 24 U/L (ref 0–45)
AST SERPL W P-5'-P-CCNC: 25 U/L (ref 0–45)
AST SERPL W P-5'-P-CCNC: 26 U/L (ref 0–45)
AST SERPL W P-5'-P-CCNC: 27 U/L (ref 0–45)
AST SERPL W P-5'-P-CCNC: 28 U/L (ref 0–45)
AST SERPL W P-5'-P-CCNC: 35 U/L (ref 0–45)
AST SERPL W P-5'-P-CCNC: 44 U/L (ref 0–45)
ATRIAL RATE - MUSE: 100 BPM
ATRIAL RATE - MUSE: 60 BPM
ATRIAL RATE - MUSE: 63 BPM
ATRIAL RATE - MUSE: 64 BPM
ATRIAL RATE - MUSE: 69 BPM
ATRIAL RATE - MUSE: 72 BPM
ATRIAL RATE - MUSE: 76 BPM
ATRIAL RATE - MUSE: 77 BPM
ATRIAL RATE - MUSE: 85 BPM
ATRIAL RATE - MUSE: 86 BPM
ATRIAL RATE - MUSE: 95 BPM
BACTERIA BLD CULT: NO GROWTH
BASE EXCESS BLDV CALC-SCNC: 11.4 MMOL/L
BASE EXCESS BLDV CALC-SCNC: 3.4 MMOL/L
BASE EXCESS BLDV CALC-SCNC: 6.7 MMOL/L
BASE EXCESS BLDV CALC-SCNC: 8.3 MMOL/L
BASOPHILS # BLD AUTO: 0 10E3/UL (ref 0–0.2)
BASOPHILS # BLD AUTO: 0.1 10E3/UL (ref 0–0.2)
BASOPHILS # BLD AUTO: ABNORMAL 10*3/UL
BASOPHILS # BLD MANUAL: 0 10E3/UL (ref 0–0.2)
BASOPHILS # BLD MANUAL: 0 10E3/UL (ref 0–0.2)
BASOPHILS NFR BLD AUTO: 0 %
BASOPHILS NFR BLD AUTO: 1 %
BASOPHILS NFR BLD AUTO: ABNORMAL %
BASOPHILS NFR BLD MANUAL: 0 %
BASOPHILS NFR BLD MANUAL: 0 %
BILIRUB DIRECT SERPL-MCNC: <0.2 MG/DL (ref 0–0.3)
BILIRUB DIRECT SERPL-MCNC: ABNORMAL MG/DL
BILIRUB SERPL-MCNC: 0.1 MG/DL (ref 0–1)
BILIRUB SERPL-MCNC: 0.2 MG/DL
BILIRUB SERPL-MCNC: 0.3 MG/DL
BILIRUB SERPL-MCNC: 0.4 MG/DL (ref 0–1)
BILIRUB SERPL-MCNC: 0.9 MG/DL (ref 0–1)
BILIRUB UR QL STRIP: NEGATIVE
BNP SERPL-MCNC: 1167 PG/ML (ref 0–147)
BNP SERPL-MCNC: 1894 PG/ML (ref 0–147)
BNP SERPL-MCNC: 200 PG/ML (ref 0–147)
BNP SERPL-MCNC: 456 PG/ML (ref 0–147)
BNP SERPL-MCNC: 588 PG/ML (ref 0–147)
BUN SERPL-MCNC: 13.8 MG/DL (ref 8–23)
BUN SERPL-MCNC: 17 MG/DL (ref 8–28)
BUN SERPL-MCNC: 17.3 MG/DL (ref 8–23)
BUN SERPL-MCNC: 17.5 MG/DL (ref 8–23)
BUN SERPL-MCNC: 17.8 MG/DL (ref 8–23)
BUN SERPL-MCNC: 18.5 MG/DL (ref 8–23)
BUN SERPL-MCNC: 18.5 MG/DL (ref 8–23)
BUN SERPL-MCNC: 18.8 MG/DL (ref 8–23)
BUN SERPL-MCNC: 19 MG/DL (ref 8–28)
BUN SERPL-MCNC: 20 MG/DL (ref 8–28)
BUN SERPL-MCNC: 20 MG/DL (ref 8–28)
BUN SERPL-MCNC: 20.4 MG/DL (ref 8–23)
BUN SERPL-MCNC: 20.6 MG/DL (ref 8–23)
BUN SERPL-MCNC: 21 MG/DL (ref 8–28)
BUN SERPL-MCNC: 22 MG/DL (ref 8–28)
BUN SERPL-MCNC: 22 MG/DL (ref 8–28)
BUN SERPL-MCNC: 23.5 MG/DL (ref 8–23)
BUN SERPL-MCNC: 24.3 MG/DL (ref 8–23)
BUN SERPL-MCNC: 24.3 MG/DL (ref 8–23)
BUN SERPL-MCNC: 24.9 MG/DL (ref 8–23)
BUN SERPL-MCNC: 25.5 MG/DL (ref 8–23)
BUN SERPL-MCNC: 26 MG/DL (ref 8–28)
BUN SERPL-MCNC: 26 MG/DL (ref 8–28)
BUN SERPL-MCNC: 26.3 MG/DL (ref 8–23)
BUN SERPL-MCNC: 26.5 MG/DL (ref 8–23)
BUN SERPL-MCNC: 27 MG/DL (ref 8–23)
BUN SERPL-MCNC: 27 MG/DL (ref 8–28)
BUN SERPL-MCNC: 27.5 MG/DL (ref 8–23)
BUN SERPL-MCNC: 28 MG/DL (ref 8–28)
BUN SERPL-MCNC: 28 MG/DL (ref 8–28)
BUN SERPL-MCNC: 29 MG/DL (ref 8–28)
BUN SERPL-MCNC: 29.8 MG/DL (ref 8–23)
BUN SERPL-MCNC: 30.3 MG/DL (ref 8–23)
BUN SERPL-MCNC: 31.1 MG/DL (ref 8–23)
BUN SERPL-MCNC: 33.4 MG/DL (ref 8–23)
BUN SERPL-MCNC: 35.1 MG/DL (ref 8–23)
BUN SERPL-MCNC: 35.1 MG/DL (ref 8–23)
BUN SERPL-MCNC: 35.8 MG/DL (ref 8–23)
BUN SERPL-MCNC: 36.1 MG/DL (ref 8–23)
BUN SERPL-MCNC: 54.1 MG/DL (ref 8–23)
C REACTIVE PROTEIN LHE: 6.3 MG/DL (ref 0–?)
CALCIUM SERPL-MCNC: 8 MG/DL (ref 8.5–10.5)
CALCIUM SERPL-MCNC: 8 MG/DL (ref 8.8–10.2)
CALCIUM SERPL-MCNC: 8.1 MG/DL (ref 8.8–10.2)
CALCIUM SERPL-MCNC: 8.2 MG/DL (ref 8.8–10.2)
CALCIUM SERPL-MCNC: 8.2 MG/DL (ref 8.8–10.2)
CALCIUM SERPL-MCNC: 8.3 MG/DL (ref 8.5–10.5)
CALCIUM SERPL-MCNC: 8.3 MG/DL (ref 8.5–10.5)
CALCIUM SERPL-MCNC: 8.3 MG/DL (ref 8.8–10.2)
CALCIUM SERPL-MCNC: 8.3 MG/DL (ref 8.8–10.2)
CALCIUM SERPL-MCNC: 8.4 MG/DL (ref 8.8–10.2)
CALCIUM SERPL-MCNC: 8.5 MG/DL (ref 8.8–10.2)
CALCIUM SERPL-MCNC: 8.6 MG/DL (ref 8.5–10.5)
CALCIUM SERPL-MCNC: 8.6 MG/DL (ref 8.5–10.5)
CALCIUM SERPL-MCNC: 8.6 MG/DL (ref 8.8–10.2)
CALCIUM SERPL-MCNC: 8.7 MG/DL (ref 8.8–10.2)
CALCIUM SERPL-MCNC: 8.8 MG/DL (ref 8.5–10.5)
CALCIUM SERPL-MCNC: 8.8 MG/DL (ref 8.8–10.2)
CALCIUM SERPL-MCNC: 8.9 MG/DL (ref 8.5–10.5)
CALCIUM SERPL-MCNC: 8.9 MG/DL (ref 8.8–10.2)
CALCIUM SERPL-MCNC: 9 MG/DL (ref 8.5–10.5)
CALCIUM SERPL-MCNC: 9 MG/DL (ref 8.8–10.2)
CALCIUM SERPL-MCNC: 9 MG/DL (ref 8.8–10.2)
CALCIUM SERPL-MCNC: 9.1 MG/DL (ref 8.5–10.5)
CALCIUM SERPL-MCNC: 9.2 MG/DL (ref 8.5–10.5)
CALCIUM SERPL-MCNC: 9.2 MG/DL (ref 8.8–10.2)
CALCIUM SERPL-MCNC: 9.3 MG/DL (ref 8.5–10.5)
CALCIUM SERPL-MCNC: 9.3 MG/DL (ref 8.5–10.5)
CALCIUM SERPL-MCNC: 9.4 MG/DL (ref 8.5–10.5)
CALCIUM SERPL-MCNC: 9.5 MG/DL (ref 8.8–10.2)
CALCIUM SERPL-MCNC: 9.6 MG/DL (ref 8.8–10.2)
CALCIUM SERPL-MCNC: 9.8 MG/DL (ref 8.5–10.5)
CALCIUM SERPL-MCNC: 9.9 MG/DL (ref 8.5–10.5)
CHLORIDE BLD-SCNC: 100 MMOL/L (ref 98–107)
CHLORIDE BLD-SCNC: 100 MMOL/L (ref 98–107)
CHLORIDE BLD-SCNC: 101 MMOL/L (ref 98–107)
CHLORIDE BLD-SCNC: 101 MMOL/L (ref 98–107)
CHLORIDE BLD-SCNC: 104 MMOL/L (ref 98–107)
CHLORIDE BLD-SCNC: 105 MMOL/L (ref 98–107)
CHLORIDE BLD-SCNC: 105 MMOL/L (ref 98–107)
CHLORIDE BLD-SCNC: 106 MMOL/L (ref 98–107)
CHLORIDE BLD-SCNC: 107 MMOL/L (ref 98–107)
CHLORIDE BLD-SCNC: 108 MMOL/L (ref 98–107)
CHLORIDE BLD-SCNC: 94 MMOL/L (ref 98–107)
CHLORIDE BLD-SCNC: 99 MMOL/L (ref 98–107)
CHLORIDE SERPL-SCNC: 101 MMOL/L (ref 98–107)
CHLORIDE SERPL-SCNC: 102 MMOL/L (ref 98–107)
CHLORIDE SERPL-SCNC: 102 MMOL/L (ref 98–107)
CHLORIDE SERPL-SCNC: 104 MMOL/L (ref 98–107)
CHLORIDE SERPL-SCNC: 104 MMOL/L (ref 98–107)
CHLORIDE SERPL-SCNC: 105 MMOL/L (ref 98–107)
CHLORIDE SERPL-SCNC: 105 MMOL/L (ref 98–107)
CHLORIDE SERPL-SCNC: 106 MMOL/L (ref 98–107)
CHLORIDE SERPL-SCNC: 106 MMOL/L (ref 98–107)
CHLORIDE SERPL-SCNC: 107 MMOL/L (ref 98–107)
CHLORIDE SERPL-SCNC: 108 MMOL/L (ref 98–107)
CHLORIDE SERPL-SCNC: 108 MMOL/L (ref 98–107)
CHLORIDE SERPL-SCNC: 109 MMOL/L (ref 98–107)
CHLORIDE SERPL-SCNC: 110 MMOL/L (ref 98–107)
CHLORIDE SERPL-SCNC: 110 MMOL/L (ref 98–107)
CHLORIDE SERPL-SCNC: 111 MMOL/L (ref 98–107)
CHLORIDE SERPL-SCNC: 113 MMOL/L (ref 98–107)
CHLORIDE SERPL-SCNC: 98 MMOL/L (ref 98–107)
CHLORIDE SERPL-SCNC: 99 MMOL/L (ref 98–107)
CHLORIDE SERPL-SCNC: 99 MMOL/L (ref 98–107)
CO2 SERPL-SCNC: 25 MMOL/L (ref 22–31)
CO2 SERPL-SCNC: 26 MMOL/L (ref 22–31)
CO2 SERPL-SCNC: 27 MMOL/L (ref 22–31)
CO2 SERPL-SCNC: 28 MMOL/L (ref 22–31)
CO2 SERPL-SCNC: 29 MMOL/L (ref 22–31)
CO2 SERPL-SCNC: 30 MMOL/L (ref 22–31)
CO2 SERPL-SCNC: 30 MMOL/L (ref 22–31)
COLOR UR AUTO: ABNORMAL
COLOR UR AUTO: COLORLESS
COLOR UR AUTO: YELLOW
CREAT SERPL-MCNC: 1.07 MG/DL (ref 0.6–1.1)
CREAT SERPL-MCNC: 1.15 MG/DL (ref 0.51–0.95)
CREAT SERPL-MCNC: 1.16 MG/DL (ref 0.51–0.95)
CREAT SERPL-MCNC: 1.21 MG/DL (ref 0.51–0.95)
CREAT SERPL-MCNC: 1.22 MG/DL (ref 0.51–0.95)
CREAT SERPL-MCNC: 1.26 MG/DL (ref 0.51–0.95)
CREAT SERPL-MCNC: 1.27 MG/DL (ref 0.51–0.95)
CREAT SERPL-MCNC: 1.29 MG/DL (ref 0.6–1.1)
CREAT SERPL-MCNC: 1.29 MG/DL (ref 0.6–1.1)
CREAT SERPL-MCNC: 1.31 MG/DL (ref 0.51–0.95)
CREAT SERPL-MCNC: 1.31 MG/DL (ref 0.6–1.1)
CREAT SERPL-MCNC: 1.34 MG/DL (ref 0.51–0.95)
CREAT SERPL-MCNC: 1.35 MG/DL (ref 0.6–1.1)
CREAT SERPL-MCNC: 1.37 MG/DL (ref 0.51–0.95)
CREAT SERPL-MCNC: 1.37 MG/DL (ref 0.51–0.95)
CREAT SERPL-MCNC: 1.37 MG/DL (ref 0.6–1.1)
CREAT SERPL-MCNC: 1.41 MG/DL (ref 0.51–0.95)
CREAT SERPL-MCNC: 1.43 MG/DL (ref 0.51–0.95)
CREAT SERPL-MCNC: 1.45 MG/DL (ref 0.51–0.95)
CREAT SERPL-MCNC: 1.52 MG/DL (ref 0.51–0.95)
CREAT SERPL-MCNC: 1.53 MG/DL (ref 0.51–0.95)
CREAT SERPL-MCNC: 1.54 MG/DL (ref 0.51–0.95)
CREAT SERPL-MCNC: 1.54 MG/DL (ref 0.6–1.1)
CREAT SERPL-MCNC: 1.59 MG/DL (ref 0.51–0.95)
CREAT SERPL-MCNC: 1.61 MG/DL (ref 0.6–1.1)
CREAT SERPL-MCNC: 1.61 MG/DL (ref 0.6–1.1)
CREAT SERPL-MCNC: 1.62 MG/DL (ref 0.51–0.95)
CREAT SERPL-MCNC: 1.63 MG/DL (ref 0.51–0.95)
CREAT SERPL-MCNC: 1.63 MG/DL (ref 0.6–1.1)
CREAT SERPL-MCNC: 1.67 MG/DL (ref 0.6–1.1)
CREAT SERPL-MCNC: 1.71 MG/DL (ref 0.51–0.95)
CREAT SERPL-MCNC: 1.73 MG/DL (ref 0.51–0.95)
CREAT SERPL-MCNC: 1.73 MG/DL (ref 0.51–0.95)
CREAT SERPL-MCNC: 1.75 MG/DL (ref 0.6–1.1)
CREAT SERPL-MCNC: 1.81 MG/DL (ref 0.6–1.1)
CREAT SERPL-MCNC: 1.81 MG/DL (ref 0.6–1.1)
CREAT SERPL-MCNC: 1.85 MG/DL (ref 0.51–0.95)
CREAT SERPL-MCNC: 1.92 MG/DL (ref 0.51–0.95)
CREAT SERPL-MCNC: 1.93 MG/DL (ref 0.6–1.1)
CREAT SERPL-MCNC: 1.94 MG/DL (ref 0.6–1.1)
CREAT SERPL-MCNC: 2.03 MG/DL (ref 0.51–0.95)
CREAT SERPL-MCNC: 2.03 MG/DL (ref 0.6–1.1)
CRP SERPL-MCNC: 15.6 MG/L
CRP SERPL-MCNC: 38 MG/L
CRP SERPL-MCNC: 47 MG/L
DEPRECATED HCO3 PLAS-SCNC: 21 MMOL/L (ref 22–29)
DEPRECATED HCO3 PLAS-SCNC: 23 MMOL/L (ref 22–29)
DEPRECATED HCO3 PLAS-SCNC: 24 MMOL/L (ref 22–29)
DEPRECATED HCO3 PLAS-SCNC: 25 MMOL/L (ref 22–29)
DEPRECATED HCO3 PLAS-SCNC: 26 MMOL/L (ref 22–29)
DEPRECATED HCO3 PLAS-SCNC: 27 MMOL/L (ref 22–29)
DEPRECATED HCO3 PLAS-SCNC: 27 MMOL/L (ref 22–29)
DEPRECATED HCO3 PLAS-SCNC: 28 MMOL/L (ref 22–29)
DEPRECATED HCO3 PLAS-SCNC: 29 MMOL/L (ref 22–29)
DEPRECATED HCO3 PLAS-SCNC: 30 MMOL/L (ref 22–29)
DEPRECATED HCO3 PLAS-SCNC: 31 MMOL/L (ref 22–29)
DEPRECATED HCO3 PLAS-SCNC: 32 MMOL/L (ref 22–29)
DIASTOLIC BLOOD PRESSURE - MUSE: 54 MMHG
DIASTOLIC BLOOD PRESSURE - MUSE: 63 MMHG
DIASTOLIC BLOOD PRESSURE - MUSE: 85 MMHG
DIASTOLIC BLOOD PRESSURE - MUSE: 95 MMHG
DIASTOLIC BLOOD PRESSURE - MUSE: NORMAL MMHG
EGFRCR SERPLBLD CKD-EPI 2021: 25 ML/MIN/1.73M2
EGFRCR SERPLBLD CKD-EPI 2021: 27 ML/MIN/1.73M2
EGFRCR SERPLBLD CKD-EPI 2021: 30 ML/MIN/1.73M2
EGFRCR SERPLBLD CKD-EPI 2021: 32 ML/MIN/1.73M2
EGFRCR SERPLBLD CKD-EPI 2021: 32 ML/MIN/1.73M2
EGFRCR SERPLBLD CKD-EPI 2021: 33 ML/MIN/1.73M2
EGFRCR SERPLBLD CKD-EPI 2021: 34 ML/MIN/1.73M2
EGFRCR SERPLBLD CKD-EPI 2021: 34 ML/MIN/1.73M2
EGFRCR SERPLBLD CKD-EPI 2021: 35 ML/MIN/1.73M2
EGFRCR SERPLBLD CKD-EPI 2021: 37 ML/MIN/1.73M2
EGFRCR SERPLBLD CKD-EPI 2021: 38 ML/MIN/1.73M2
EGFRCR SERPLBLD CKD-EPI 2021: 39 ML/MIN/1.73M2
EGFRCR SERPLBLD CKD-EPI 2021: 39 ML/MIN/1.73M2
EGFRCR SERPLBLD CKD-EPI 2021: 40 ML/MIN/1.73M2
EGFRCR SERPLBLD CKD-EPI 2021: 42 ML/MIN/1.73M2
EGFRCR SERPLBLD CKD-EPI 2021: 43 ML/MIN/1.73M2
EGFRCR SERPLBLD CKD-EPI 2021: 43 ML/MIN/1.73M2
EGFRCR SERPLBLD CKD-EPI 2021: 45 ML/MIN/1.73M2
EGFRCR SERPLBLD CKD-EPI 2021: 46 ML/MIN/1.73M2
EGFRCR SERPLBLD CKD-EPI 2021: 48 ML/MIN/1.73M2
EGFRCR SERPLBLD CKD-EPI 2021: 49 ML/MIN/1.73M2
EOSINOPHIL # BLD AUTO: 0 10E3/UL (ref 0–0.7)
EOSINOPHIL # BLD AUTO: 0.1 10E3/UL (ref 0–0.7)
EOSINOPHIL # BLD AUTO: 0.1 10E3/UL (ref 0–0.7)
EOSINOPHIL # BLD AUTO: 0.2 10E3/UL (ref 0–0.7)
EOSINOPHIL # BLD AUTO: 0.3 10E3/UL (ref 0–0.7)
EOSINOPHIL # BLD AUTO: 0.4 10E3/UL (ref 0–0.7)
EOSINOPHIL # BLD AUTO: 0.5 10E3/UL (ref 0–0.7)
EOSINOPHIL # BLD AUTO: 0.5 10E3/UL (ref 0–0.7)
EOSINOPHIL # BLD AUTO: ABNORMAL 10*3/UL
EOSINOPHIL # BLD MANUAL: 0.2 10E3/UL (ref 0–0.7)
EOSINOPHIL # BLD MANUAL: 0.4 10E3/UL (ref 0–0.7)
EOSINOPHIL NFR BLD AUTO: 0 %
EOSINOPHIL NFR BLD AUTO: 1 %
EOSINOPHIL NFR BLD AUTO: 3 %
EOSINOPHIL NFR BLD AUTO: 4 %
EOSINOPHIL NFR BLD AUTO: 4 %
EOSINOPHIL NFR BLD AUTO: 5 %
EOSINOPHIL NFR BLD AUTO: 5 %
EOSINOPHIL NFR BLD AUTO: ABNORMAL %
EOSINOPHIL NFR BLD MANUAL: 1 %
EOSINOPHIL NFR BLD MANUAL: 1 %
ERYTHROCYTE [DISTWIDTH] IN BLOOD BY AUTOMATED COUNT: 13.6 % (ref 10–15)
ERYTHROCYTE [DISTWIDTH] IN BLOOD BY AUTOMATED COUNT: 13.9 % (ref 10–15)
ERYTHROCYTE [DISTWIDTH] IN BLOOD BY AUTOMATED COUNT: 13.9 % (ref 10–15)
ERYTHROCYTE [DISTWIDTH] IN BLOOD BY AUTOMATED COUNT: 14.1 % (ref 10–15)
ERYTHROCYTE [DISTWIDTH] IN BLOOD BY AUTOMATED COUNT: 14.1 % (ref 10–15)
ERYTHROCYTE [DISTWIDTH] IN BLOOD BY AUTOMATED COUNT: 14.2 % (ref 10–15)
ERYTHROCYTE [DISTWIDTH] IN BLOOD BY AUTOMATED COUNT: 14.3 % (ref 10–15)
ERYTHROCYTE [DISTWIDTH] IN BLOOD BY AUTOMATED COUNT: 14.4 % (ref 10–15)
ERYTHROCYTE [DISTWIDTH] IN BLOOD BY AUTOMATED COUNT: 14.5 % (ref 10–15)
ERYTHROCYTE [DISTWIDTH] IN BLOOD BY AUTOMATED COUNT: 14.6 % (ref 10–15)
ERYTHROCYTE [DISTWIDTH] IN BLOOD BY AUTOMATED COUNT: 14.7 % (ref 10–15)
ERYTHROCYTE [DISTWIDTH] IN BLOOD BY AUTOMATED COUNT: 14.8 % (ref 10–15)
ERYTHROCYTE [DISTWIDTH] IN BLOOD BY AUTOMATED COUNT: 14.8 % (ref 10–15)
ERYTHROCYTE [DISTWIDTH] IN BLOOD BY AUTOMATED COUNT: 15.1 % (ref 10–15)
ERYTHROCYTE [DISTWIDTH] IN BLOOD BY AUTOMATED COUNT: 15.4 % (ref 10–15)
ERYTHROCYTE [DISTWIDTH] IN BLOOD BY AUTOMATED COUNT: 15.6 % (ref 10–15)
ERYTHROCYTE [DISTWIDTH] IN BLOOD BY AUTOMATED COUNT: 15.7 % (ref 10–15)
ERYTHROCYTE [DISTWIDTH] IN BLOOD BY AUTOMATED COUNT: 16.1 % (ref 10–15)
FLUAV RNA SPEC QL NAA+PROBE: NEGATIVE
FLUBV RNA RESP QL NAA+PROBE: NEGATIVE
GFR SERPL CREATININE-BSD FRML MDRD: 25 ML/MIN/1.73M2
GFR SERPL CREATININE-BSD FRML MDRD: 26 ML/MIN/1.73M2
GFR SERPL CREATININE-BSD FRML MDRD: 28 ML/MIN/1.73M2
GFR SERPL CREATININE-BSD FRML MDRD: 28 ML/MIN/1.73M2
GFR SERPL CREATININE-BSD FRML MDRD: 29 ML/MIN/1.73M2
GFR SERPL CREATININE-BSD FRML MDRD: 31 ML/MIN/1.73M2
GFR SERPL CREATININE-BSD FRML MDRD: 32 ML/MIN/1.73M2
GFR SERPL CREATININE-BSD FRML MDRD: 34 ML/MIN/1.73M2
GFR SERPL CREATININE-BSD FRML MDRD: 39 ML/MIN/1.73M2
GFR SERPL CREATININE-BSD FRML MDRD: 40 ML/MIN/1.73M2
GFR SERPL CREATININE-BSD FRML MDRD: 42 ML/MIN/1.73M2
GFR SERPL CREATININE-BSD FRML MDRD: 53 ML/MIN/1.73M2
GLUCOSE BLD-MCNC: 100 MG/DL (ref 70–125)
GLUCOSE BLD-MCNC: 102 MG/DL (ref 70–125)
GLUCOSE BLD-MCNC: 118 MG/DL (ref 70–125)
GLUCOSE BLD-MCNC: 118 MG/DL (ref 70–125)
GLUCOSE BLD-MCNC: 127 MG/DL (ref 70–125)
GLUCOSE BLD-MCNC: 127 MG/DL (ref 70–125)
GLUCOSE BLD-MCNC: 143 MG/DL (ref 70–125)
GLUCOSE BLD-MCNC: 151 MG/DL (ref 70–125)
GLUCOSE BLD-MCNC: 83 MG/DL (ref 70–125)
GLUCOSE BLD-MCNC: 84 MG/DL (ref 70–125)
GLUCOSE BLD-MCNC: 86 MG/DL (ref 70–125)
GLUCOSE BLD-MCNC: 88 MG/DL (ref 70–125)
GLUCOSE BLD-MCNC: 90 MG/DL (ref 70–125)
GLUCOSE BLD-MCNC: 93 MG/DL (ref 70–125)
GLUCOSE BLD-MCNC: 99 MG/DL (ref 70–125)
GLUCOSE BLDC GLUCOMTR-MCNC: 97 MG/DL (ref 70–99)
GLUCOSE SERPL-MCNC: 101 MG/DL (ref 70–99)
GLUCOSE SERPL-MCNC: 103 MG/DL (ref 70–99)
GLUCOSE SERPL-MCNC: 104 MG/DL (ref 70–99)
GLUCOSE SERPL-MCNC: 115 MG/DL (ref 70–99)
GLUCOSE SERPL-MCNC: 125 MG/DL (ref 70–99)
GLUCOSE SERPL-MCNC: 125 MG/DL (ref 70–99)
GLUCOSE SERPL-MCNC: 129 MG/DL (ref 70–99)
GLUCOSE SERPL-MCNC: 144 MG/DL (ref 70–99)
GLUCOSE SERPL-MCNC: 70 MG/DL (ref 70–99)
GLUCOSE SERPL-MCNC: 75 MG/DL (ref 70–99)
GLUCOSE SERPL-MCNC: 75 MG/DL (ref 70–99)
GLUCOSE SERPL-MCNC: 77 MG/DL (ref 70–99)
GLUCOSE SERPL-MCNC: 80 MG/DL (ref 70–99)
GLUCOSE SERPL-MCNC: 81 MG/DL (ref 70–99)
GLUCOSE SERPL-MCNC: 85 MG/DL (ref 70–99)
GLUCOSE SERPL-MCNC: 86 MG/DL (ref 70–99)
GLUCOSE SERPL-MCNC: 88 MG/DL (ref 70–99)
GLUCOSE SERPL-MCNC: 88 MG/DL (ref 70–99)
GLUCOSE SERPL-MCNC: 90 MG/DL (ref 70–99)
GLUCOSE SERPL-MCNC: 92 MG/DL (ref 70–99)
GLUCOSE SERPL-MCNC: 92 MG/DL (ref 70–99)
GLUCOSE SERPL-MCNC: 93 MG/DL (ref 70–99)
GLUCOSE SERPL-MCNC: 93 MG/DL (ref 70–99)
GLUCOSE SERPL-MCNC: 94 MG/DL (ref 70–99)
GLUCOSE SERPL-MCNC: 95 MG/DL (ref 70–99)
GLUCOSE SERPL-MCNC: 98 MG/DL (ref 70–99)
GLUCOSE SERPL-MCNC: 98 MG/DL (ref 70–99)
GLUCOSE UR STRIP-MCNC: NEGATIVE MG/DL
HCO3 BLDV-SCNC: 29 MMOL/L (ref 24–30)
HCO3 BLDV-SCNC: 32 MMOL/L (ref 24–30)
HCO3 BLDV-SCNC: 33 MMOL/L (ref 24–30)
HCO3 BLDV-SCNC: 36 MMOL/L (ref 24–30)
HCO3 SERPL-SCNC: 29 MMOL/L (ref 22–29)
HCT VFR BLD AUTO: 28.2 % (ref 35–47)
HCT VFR BLD AUTO: 28.6 % (ref 35–47)
HCT VFR BLD AUTO: 29.8 % (ref 35–47)
HCT VFR BLD AUTO: 30 % (ref 35–47)
HCT VFR BLD AUTO: 31.3 % (ref 35–47)
HCT VFR BLD AUTO: 31.9 % (ref 35–47)
HCT VFR BLD AUTO: 32.2 % (ref 35–47)
HCT VFR BLD AUTO: 32.3 % (ref 35–47)
HCT VFR BLD AUTO: 33.2 % (ref 35–47)
HCT VFR BLD AUTO: 33.9 % (ref 35–47)
HCT VFR BLD AUTO: 34 % (ref 35–47)
HCT VFR BLD AUTO: 34.1 % (ref 35–47)
HCT VFR BLD AUTO: 34.6 % (ref 35–47)
HCT VFR BLD AUTO: 34.7 % (ref 35–47)
HCT VFR BLD AUTO: 34.9 % (ref 35–47)
HCT VFR BLD AUTO: 35.5 % (ref 35–47)
HCT VFR BLD AUTO: 35.7 % (ref 35–47)
HCT VFR BLD AUTO: 36 % (ref 35–47)
HCT VFR BLD AUTO: 36.2 % (ref 35–47)
HCT VFR BLD AUTO: 36.9 % (ref 35–47)
HCT VFR BLD AUTO: 37.2 % (ref 35–47)
HCT VFR BLD AUTO: 37.3 % (ref 35–47)
HCT VFR BLD AUTO: 37.4 % (ref 35–47)
HCT VFR BLD AUTO: 37.6 % (ref 35–47)
HCT VFR BLD AUTO: 37.9 % (ref 35–47)
HCT VFR BLD AUTO: 39.4 % (ref 35–47)
HCT VFR BLD AUTO: 40.3 % (ref 35–47)
HCT VFR BLD AUTO: 40.4 % (ref 35–47)
HCT VFR BLD AUTO: 40.5 % (ref 35–47)
HCT VFR BLD AUTO: 42.2 % (ref 35–47)
HCT VFR BLD AUTO: 42.7 % (ref 35–47)
HCT VFR BLD AUTO: 43.5 % (ref 35–47)
HGB BLD-MCNC: 10.1 G/DL (ref 11.7–15.7)
HGB BLD-MCNC: 10.2 G/DL (ref 11.7–15.7)
HGB BLD-MCNC: 10.2 G/DL (ref 11.7–15.7)
HGB BLD-MCNC: 10.4 G/DL (ref 11.7–15.7)
HGB BLD-MCNC: 10.6 G/DL (ref 11.7–15.7)
HGB BLD-MCNC: 10.6 G/DL (ref 11.7–15.7)
HGB BLD-MCNC: 10.7 G/DL (ref 11.7–15.7)
HGB BLD-MCNC: 10.8 G/DL (ref 11.7–15.7)
HGB BLD-MCNC: 10.8 G/DL (ref 11.7–15.7)
HGB BLD-MCNC: 10.9 G/DL (ref 11.7–15.7)
HGB BLD-MCNC: 11 G/DL (ref 11.7–15.7)
HGB BLD-MCNC: 11.1 G/DL (ref 11.7–15.7)
HGB BLD-MCNC: 11.4 G/DL (ref 11.7–15.7)
HGB BLD-MCNC: 11.7 G/DL (ref 11.7–15.7)
HGB BLD-MCNC: 11.8 G/DL (ref 11.7–15.7)
HGB BLD-MCNC: 11.8 G/DL (ref 11.7–15.7)
HGB BLD-MCNC: 11.9 G/DL (ref 11.7–15.7)
HGB BLD-MCNC: 12 G/DL (ref 11.7–15.7)
HGB BLD-MCNC: 12.3 G/DL (ref 11.7–15.7)
HGB BLD-MCNC: 12.3 G/DL (ref 11.7–15.7)
HGB BLD-MCNC: 12.6 G/DL (ref 11.7–15.7)
HGB BLD-MCNC: 13.6 G/DL (ref 11.7–15.7)
HGB BLD-MCNC: 13.7 G/DL (ref 11.7–15.7)
HGB BLD-MCNC: 13.7 G/DL (ref 11.7–15.7)
HGB BLD-MCNC: 8.7 G/DL (ref 11.7–15.7)
HGB BLD-MCNC: 8.8 G/DL (ref 11.7–15.7)
HGB BLD-MCNC: 9.2 G/DL (ref 11.7–15.7)
HGB BLD-MCNC: 9.2 G/DL (ref 11.7–15.7)
HGB BLD-MCNC: 9.4 G/DL (ref 11.7–15.7)
HGB BLD-MCNC: 9.7 G/DL (ref 11.7–15.7)
HGB BLD-MCNC: 9.9 G/DL (ref 11.7–15.7)
HGB UR QL STRIP: ABNORMAL
HGB UR QL STRIP: NEGATIVE
HGB UR QL STRIP: NEGATIVE
HOLD SPECIMEN: NORMAL
IMM GRANULOCYTES # BLD: 0 10E3/UL
IMM GRANULOCYTES # BLD: 0.1 10E3/UL
IMM GRANULOCYTES # BLD: 0.2 10E3/UL
IMM GRANULOCYTES # BLD: ABNORMAL 10*3/UL
IMM GRANULOCYTES NFR BLD: 0 %
IMM GRANULOCYTES NFR BLD: 1 %
IMM GRANULOCYTES NFR BLD: ABNORMAL %
INR PPP: 1.05 (ref 0.85–1.15)
INR PPP: 1.08 (ref 0.85–1.15)
INR PPP: 1.15 (ref 0.85–1.15)
INR PPP: 1.28 (ref 0.85–1.15)
INTERPRETATION ECG - MUSE: NORMAL
INTERPRETATION: NORMAL
KETONES UR STRIP-MCNC: ABNORMAL MG/DL
KETONES UR STRIP-MCNC: NEGATIVE MG/DL
KETONES UR STRIP-MCNC: NEGATIVE MG/DL
LAB DIRECTOR COMMENTS: NORMAL
LAB DIRECTOR DISCLAIMER: NORMAL
LAB DIRECTOR INTERPRETATION: NORMAL
LAB DIRECTOR METHODOLOGY: NORMAL
LAB DIRECTOR RESULTS: NORMAL
LACTATE SERPL-SCNC: 0.7 MMOL/L (ref 0.7–2)
LACTATE SERPL-SCNC: 1.2 MMOL/L (ref 0.7–2)
LACTATE SERPL-SCNC: 1.2 MMOL/L (ref 0.7–2)
LACTATE SERPL-SCNC: 1.7 MMOL/L (ref 0.7–2)
LEUKOCYTE ESTERASE UR QL STRIP: NEGATIVE
LIPASE SERPL-CCNC: 32 U/L (ref 0–52)
LIPASE SERPL-CCNC: 60 U/L (ref 13–60)
LVEF ECHO: NORMAL
LYMPHOCYTES # BLD AUTO: 0.3 10E3/UL (ref 0.8–5.3)
LYMPHOCYTES # BLD AUTO: 0.7 10E3/UL (ref 0.8–5.3)
LYMPHOCYTES # BLD AUTO: 0.8 10E3/UL (ref 0.8–5.3)
LYMPHOCYTES # BLD AUTO: 0.8 10E3/UL (ref 0.8–5.3)
LYMPHOCYTES # BLD AUTO: 0.9 10E3/UL (ref 0.8–5.3)
LYMPHOCYTES # BLD AUTO: 1 10E3/UL (ref 0.8–5.3)
LYMPHOCYTES # BLD AUTO: 1.2 10E3/UL (ref 0.8–5.3)
LYMPHOCYTES # BLD AUTO: 1.2 10E3/UL (ref 0.8–5.3)
LYMPHOCYTES # BLD AUTO: 1.4 10E3/UL (ref 0.8–5.3)
LYMPHOCYTES # BLD AUTO: 1.5 10E3/UL (ref 0.8–5.3)
LYMPHOCYTES # BLD AUTO: 1.5 10E3/UL (ref 0.8–5.3)
LYMPHOCYTES # BLD AUTO: 1.6 10E3/UL (ref 0.8–5.3)
LYMPHOCYTES # BLD AUTO: 1.6 10E3/UL (ref 0.8–5.3)
LYMPHOCYTES # BLD AUTO: 1.7 10E3/UL (ref 0.8–5.3)
LYMPHOCYTES # BLD AUTO: 2 10E3/UL (ref 0.8–5.3)
LYMPHOCYTES # BLD AUTO: 2 10E3/UL (ref 0.8–5.3)
LYMPHOCYTES # BLD AUTO: ABNORMAL 10*3/UL
LYMPHOCYTES # BLD MANUAL: 1.6 10E3/UL (ref 0.8–5.3)
LYMPHOCYTES # BLD MANUAL: 2.7 10E3/UL (ref 0.8–5.3)
LYMPHOCYTES NFR BLD AUTO: 10 %
LYMPHOCYTES NFR BLD AUTO: 11 %
LYMPHOCYTES NFR BLD AUTO: 15 %
LYMPHOCYTES NFR BLD AUTO: 15 %
LYMPHOCYTES NFR BLD AUTO: 16 %
LYMPHOCYTES NFR BLD AUTO: 16 %
LYMPHOCYTES NFR BLD AUTO: 18 %
LYMPHOCYTES NFR BLD AUTO: 3 %
LYMPHOCYTES NFR BLD AUTO: 4 %
LYMPHOCYTES NFR BLD AUTO: 8 %
LYMPHOCYTES NFR BLD AUTO: 9 %
LYMPHOCYTES NFR BLD AUTO: 9 %
LYMPHOCYTES NFR BLD AUTO: ABNORMAL %
LYMPHOCYTES NFR BLD MANUAL: 12 %
LYMPHOCYTES NFR BLD MANUAL: 4 %
MAGNESIUM SERPL-MCNC: 1.9 MG/DL (ref 1.8–2.6)
MAGNESIUM SERPL-MCNC: 2 MG/DL (ref 1.8–2.6)
MAGNESIUM SERPL-MCNC: 2.1 MG/DL (ref 1.7–2.3)
MAGNESIUM SERPL-MCNC: 2.1 MG/DL (ref 1.8–2.6)
MAGNESIUM SERPL-MCNC: 2.1 MG/DL (ref 1.8–2.6)
MAGNESIUM SERPL-MCNC: 2.2 MG/DL (ref 1.8–2.6)
MAGNESIUM SERPL-MCNC: 2.3 MG/DL (ref 1.7–2.3)
MAGNESIUM SERPL-MCNC: 2.3 MG/DL (ref 1.8–2.6)
MAGNESIUM SERPL-MCNC: 2.6 MG/DL (ref 1.7–2.3)
MAGNESIUM SERPL-MCNC: 2.6 MG/DL (ref 1.8–2.6)
MCH RBC QN AUTO: 27.9 PG (ref 26.5–33)
MCH RBC QN AUTO: 27.9 PG (ref 26.5–33)
MCH RBC QN AUTO: 28.1 PG (ref 26.5–33)
MCH RBC QN AUTO: 28.3 PG (ref 26.5–33)
MCH RBC QN AUTO: 28.4 PG (ref 26.5–33)
MCH RBC QN AUTO: 28.5 PG (ref 26.5–33)
MCH RBC QN AUTO: 28.6 PG (ref 26.5–33)
MCH RBC QN AUTO: 28.7 PG (ref 26.5–33)
MCH RBC QN AUTO: 28.7 PG (ref 26.5–33)
MCH RBC QN AUTO: 28.8 PG (ref 26.5–33)
MCH RBC QN AUTO: 28.9 PG (ref 26.5–33)
MCH RBC QN AUTO: 28.9 PG (ref 26.5–33)
MCH RBC QN AUTO: 29 PG (ref 26.5–33)
MCH RBC QN AUTO: 29.1 PG (ref 26.5–33)
MCH RBC QN AUTO: 29.4 PG (ref 26.5–33)
MCHC RBC AUTO-ENTMCNC: 29.2 G/DL (ref 31.5–36.5)
MCHC RBC AUTO-ENTMCNC: 29.4 G/DL (ref 31.5–36.5)
MCHC RBC AUTO-ENTMCNC: 29.5 G/DL (ref 31.5–36.5)
MCHC RBC AUTO-ENTMCNC: 29.7 G/DL (ref 31.5–36.5)
MCHC RBC AUTO-ENTMCNC: 29.8 G/DL (ref 31.5–36.5)
MCHC RBC AUTO-ENTMCNC: 30.1 G/DL (ref 31.5–36.5)
MCHC RBC AUTO-ENTMCNC: 30.2 G/DL (ref 31.5–36.5)
MCHC RBC AUTO-ENTMCNC: 30.3 G/DL (ref 31.5–36.5)
MCHC RBC AUTO-ENTMCNC: 30.3 G/DL (ref 31.5–36.5)
MCHC RBC AUTO-ENTMCNC: 30.4 G/DL (ref 31.5–36.5)
MCHC RBC AUTO-ENTMCNC: 30.4 G/DL (ref 31.5–36.5)
MCHC RBC AUTO-ENTMCNC: 30.6 G/DL (ref 31.5–36.5)
MCHC RBC AUTO-ENTMCNC: 30.6 G/DL (ref 31.5–36.5)
MCHC RBC AUTO-ENTMCNC: 30.7 G/DL (ref 31.5–36.5)
MCHC RBC AUTO-ENTMCNC: 30.9 G/DL (ref 31.5–36.5)
MCHC RBC AUTO-ENTMCNC: 30.9 G/DL (ref 31.5–36.5)
MCHC RBC AUTO-ENTMCNC: 31 G/DL (ref 31.5–36.5)
MCHC RBC AUTO-ENTMCNC: 31 G/DL (ref 31.5–36.5)
MCHC RBC AUTO-ENTMCNC: 31.2 G/DL (ref 31.5–36.5)
MCHC RBC AUTO-ENTMCNC: 31.2 G/DL (ref 31.5–36.5)
MCHC RBC AUTO-ENTMCNC: 31.3 G/DL (ref 31.5–36.5)
MCHC RBC AUTO-ENTMCNC: 31.4 G/DL (ref 31.5–36.5)
MCHC RBC AUTO-ENTMCNC: 31.5 G/DL (ref 31.5–36.5)
MCHC RBC AUTO-ENTMCNC: 31.6 G/DL (ref 31.5–36.5)
MCHC RBC AUTO-ENTMCNC: 31.7 G/DL (ref 31.5–36.5)
MCHC RBC AUTO-ENTMCNC: 32.1 G/DL (ref 31.5–36.5)
MCHC RBC AUTO-ENTMCNC: 32.2 G/DL (ref 31.5–36.5)
MCV RBC AUTO: 87 FL (ref 78–100)
MCV RBC AUTO: 89 FL (ref 78–100)
MCV RBC AUTO: 91 FL (ref 78–100)
MCV RBC AUTO: 92 FL (ref 78–100)
MCV RBC AUTO: 92 FL (ref 78–100)
MCV RBC AUTO: 93 FL (ref 78–100)
MCV RBC AUTO: 94 FL (ref 78–100)
MCV RBC AUTO: 95 FL (ref 78–100)
MCV RBC AUTO: 96 FL (ref 78–100)
MCV RBC AUTO: 97 FL (ref 78–100)
MONOCYTES # BLD AUTO: 0.1 10E3/UL (ref 0–1.3)
MONOCYTES # BLD AUTO: 0.9 10E3/UL (ref 0–1.3)
MONOCYTES # BLD AUTO: 0.9 10E3/UL (ref 0–1.3)
MONOCYTES # BLD AUTO: 1 10E3/UL (ref 0–1.3)
MONOCYTES # BLD AUTO: 1 10E3/UL (ref 0–1.3)
MONOCYTES # BLD AUTO: 1.1 10E3/UL (ref 0–1.3)
MONOCYTES # BLD AUTO: 1.2 10E3/UL (ref 0–1.3)
MONOCYTES # BLD AUTO: 1.3 10E3/UL (ref 0–1.3)
MONOCYTES # BLD AUTO: 1.5 10E3/UL (ref 0–1.3)
MONOCYTES # BLD AUTO: 1.5 10E3/UL (ref 0–1.3)
MONOCYTES # BLD AUTO: 1.6 10E3/UL (ref 0–1.3)
MONOCYTES # BLD AUTO: 1.8 10E3/UL (ref 0–1.3)
MONOCYTES # BLD AUTO: 1.9 10E3/UL (ref 0–1.3)
MONOCYTES # BLD AUTO: 2 10E3/UL (ref 0–1.3)
MONOCYTES # BLD AUTO: ABNORMAL 10*3/UL
MONOCYTES # BLD MANUAL: 1.1 10E3/UL (ref 0–1.3)
MONOCYTES # BLD MANUAL: 4.8 10E3/UL (ref 0–1.3)
MONOCYTES NFR BLD AUTO: 1 %
MONOCYTES NFR BLD AUTO: 10 %
MONOCYTES NFR BLD AUTO: 10 %
MONOCYTES NFR BLD AUTO: 11 %
MONOCYTES NFR BLD AUTO: 12 %
MONOCYTES NFR BLD AUTO: 12 %
MONOCYTES NFR BLD AUTO: 14 %
MONOCYTES NFR BLD AUTO: 4 %
MONOCYTES NFR BLD AUTO: 5 %
MONOCYTES NFR BLD AUTO: 6 %
MONOCYTES NFR BLD AUTO: 7 %
MONOCYTES NFR BLD AUTO: 9 %
MONOCYTES NFR BLD AUTO: ABNORMAL %
MONOCYTES NFR BLD MANUAL: 12 %
MONOCYTES NFR BLD MANUAL: 5 %
MRSA DNA SPEC QL NAA+PROBE: NEGATIVE
MYELOCYTES # BLD MANUAL: 0.8 10E3/UL
MYELOCYTES NFR BLD MANUAL: 2 %
NEUTROPHILS # BLD AUTO: 11.1 10E3/UL (ref 1.6–8.3)
NEUTROPHILS # BLD AUTO: 11.2 10E3/UL (ref 1.6–8.3)
NEUTROPHILS # BLD AUTO: 15.1 10E3/UL (ref 1.6–8.3)
NEUTROPHILS # BLD AUTO: 16.5 10E3/UL (ref 1.6–8.3)
NEUTROPHILS # BLD AUTO: 19.2 10E3/UL (ref 1.6–8.3)
NEUTROPHILS # BLD AUTO: 22 10E3/UL (ref 1.6–8.3)
NEUTROPHILS # BLD AUTO: 22.8 10E3/UL (ref 1.6–8.3)
NEUTROPHILS # BLD AUTO: 23.6 10E3/UL (ref 1.6–8.3)
NEUTROPHILS # BLD AUTO: 5.5 10E3/UL (ref 1.6–8.3)
NEUTROPHILS # BLD AUTO: 5.9 10E3/UL (ref 1.6–8.3)
NEUTROPHILS # BLD AUTO: 6.1 10E3/UL (ref 1.6–8.3)
NEUTROPHILS # BLD AUTO: 6.1 10E3/UL (ref 1.6–8.3)
NEUTROPHILS # BLD AUTO: 6.6 10E3/UL (ref 1.6–8.3)
NEUTROPHILS # BLD AUTO: 7.6 10E3/UL (ref 1.6–8.3)
NEUTROPHILS # BLD AUTO: 7.7 10E3/UL (ref 1.6–8.3)
NEUTROPHILS # BLD AUTO: 9 10E3/UL (ref 1.6–8.3)
NEUTROPHILS # BLD AUTO: ABNORMAL 10*3/UL
NEUTROPHILS # BLD MANUAL: 18.6 10E3/UL (ref 1.6–8.3)
NEUTROPHILS # BLD MANUAL: 32.2 10E3/UL (ref 1.6–8.3)
NEUTROPHILS NFR BLD AUTO: 66 %
NEUTROPHILS NFR BLD AUTO: 67 %
NEUTROPHILS NFR BLD AUTO: 68 %
NEUTROPHILS NFR BLD AUTO: 68 %
NEUTROPHILS NFR BLD AUTO: 71 %
NEUTROPHILS NFR BLD AUTO: 71 %
NEUTROPHILS NFR BLD AUTO: 73 %
NEUTROPHILS NFR BLD AUTO: 74 %
NEUTROPHILS NFR BLD AUTO: 79 %
NEUTROPHILS NFR BLD AUTO: 80 %
NEUTROPHILS NFR BLD AUTO: 81 %
NEUTROPHILS NFR BLD AUTO: 85 %
NEUTROPHILS NFR BLD AUTO: 88 %
NEUTROPHILS NFR BLD AUTO: 90 %
NEUTROPHILS NFR BLD AUTO: 91 %
NEUTROPHILS NFR BLD AUTO: 95 %
NEUTROPHILS NFR BLD AUTO: ABNORMAL %
NEUTROPHILS NFR BLD MANUAL: 81 %
NEUTROPHILS NFR BLD MANUAL: 82 %
NITRATE UR QL: NEGATIVE
NRBC # BLD AUTO: 0 10E3/UL
NRBC BLD AUTO-RTO: 0 /100
NT-PROBNP SERPL-MCNC: 1845 PG/ML (ref 0–1800)
NT-PROBNP SERPL-MCNC: 6037 PG/ML (ref 0–1800)
NT-PROBNP SERPL-MCNC: 9602 PG/ML (ref 0–1800)
OXYHGB MFR BLDV: 21.9 % (ref 70–75)
OXYHGB MFR BLDV: 26.2 % (ref 70–75)
OXYHGB MFR BLDV: 29.9 % (ref 70–75)
OXYHGB MFR BLDV: 48.3 % (ref 70–75)
P AXIS - MUSE: 17 DEGREES
P AXIS - MUSE: 56 DEGREES
P AXIS - MUSE: 56 DEGREES
P AXIS - MUSE: 62 DEGREES
P AXIS - MUSE: 69 DEGREES
P AXIS - MUSE: 70 DEGREES
P AXIS - MUSE: 72 DEGREES
P AXIS - MUSE: 73 DEGREES
P AXIS - MUSE: 75 DEGREES
P AXIS - MUSE: 75 DEGREES
P AXIS - MUSE: 79 DEGREES
PCO2 BLDV: 51 MM HG (ref 35–50)
PCO2 BLDV: 51 MM HG (ref 35–50)
PCO2 BLDV: 54 MM HG (ref 35–50)
PCO2 BLDV: 56 MM HG (ref 35–50)
PH BLDV: 7.34 [PH] (ref 7.35–7.45)
PH BLDV: 7.4 [PH] (ref 7.35–7.45)
PH BLDV: 7.42 [PH] (ref 7.35–7.45)
PH BLDV: 7.42 [PH] (ref 7.35–7.45)
PH UR STRIP: 6 [PH] (ref 5–7)
PH UR STRIP: 6 [PH] (ref 5–7)
PH UR STRIP: 6.5 [PH] (ref 5–7)
PLAT MORPH BLD: ABNORMAL
PLAT MORPH BLD: ABNORMAL
PLATELET # BLD AUTO: 170 10E3/UL (ref 150–450)
PLATELET # BLD AUTO: 172 10E3/UL (ref 150–450)
PLATELET # BLD AUTO: 175 10E3/UL (ref 150–450)
PLATELET # BLD AUTO: 176 10E3/UL (ref 150–450)
PLATELET # BLD AUTO: 177 10E3/UL (ref 150–450)
PLATELET # BLD AUTO: 185 10E3/UL (ref 150–450)
PLATELET # BLD AUTO: 191 10E3/UL (ref 150–450)
PLATELET # BLD AUTO: 205 10E3/UL (ref 150–450)
PLATELET # BLD AUTO: 205 10E3/UL (ref 150–450)
PLATELET # BLD AUTO: 206 10E3/UL (ref 150–450)
PLATELET # BLD AUTO: 209 10E3/UL (ref 150–450)
PLATELET # BLD AUTO: 209 10E3/UL (ref 150–450)
PLATELET # BLD AUTO: 210 10E3/UL (ref 150–450)
PLATELET # BLD AUTO: 212 10E3/UL (ref 150–450)
PLATELET # BLD AUTO: 212 10E3/UL (ref 150–450)
PLATELET # BLD AUTO: 215 10E3/UL (ref 150–450)
PLATELET # BLD AUTO: 218 10E3/UL (ref 150–450)
PLATELET # BLD AUTO: 223 10E3/UL (ref 150–450)
PLATELET # BLD AUTO: 227 10E3/UL (ref 150–450)
PLATELET # BLD AUTO: 233 10E3/UL (ref 150–450)
PLATELET # BLD AUTO: 242 10E3/UL (ref 150–450)
PLATELET # BLD AUTO: 243 10E3/UL (ref 150–450)
PLATELET # BLD AUTO: 244 10E3/UL (ref 150–450)
PLATELET # BLD AUTO: 247 10E3/UL (ref 150–450)
PLATELET # BLD AUTO: 248 10E3/UL (ref 150–450)
PLATELET # BLD AUTO: 250 10E3/UL (ref 150–450)
PLATELET # BLD AUTO: 258 10E3/UL (ref 150–450)
PLATELET # BLD AUTO: 260 10E3/UL (ref 150–450)
PLATELET # BLD AUTO: 274 10E3/UL (ref 150–450)
PLATELET # BLD AUTO: 280 10E3/UL (ref 150–450)
PLATELET # BLD AUTO: 317 10E3/UL (ref 150–450)
PO2 BLDV: 17 MM HG (ref 25–47)
PO2 BLDV: 19 MM HG (ref 25–47)
PO2 BLDV: 21 MM HG (ref 25–47)
PO2 BLDV: 29 MM HG (ref 25–47)
POTASSIUM BLD-SCNC: 3 MMOL/L (ref 3.5–5)
POTASSIUM BLD-SCNC: 3.1 MMOL/L (ref 3.5–5)
POTASSIUM BLD-SCNC: 3.4 MMOL/L (ref 3.5–5)
POTASSIUM BLD-SCNC: 3.7 MMOL/L (ref 3.5–5)
POTASSIUM BLD-SCNC: 3.8 MMOL/L (ref 3.5–5)
POTASSIUM BLD-SCNC: 3.9 MMOL/L (ref 3.5–5)
POTASSIUM BLD-SCNC: 3.9 MMOL/L (ref 3.5–5)
POTASSIUM BLD-SCNC: 4 MMOL/L (ref 3.5–5)
POTASSIUM BLD-SCNC: 4.1 MMOL/L (ref 3.5–5)
POTASSIUM BLD-SCNC: 4.4 MMOL/L (ref 3.5–5)
POTASSIUM BLD-SCNC: 4.5 MMOL/L (ref 3.5–5)
POTASSIUM SERPL-SCNC: 3.1 MMOL/L (ref 3.4–5.3)
POTASSIUM SERPL-SCNC: 3.3 MMOL/L (ref 3.4–5.3)
POTASSIUM SERPL-SCNC: 3.5 MMOL/L (ref 3.4–5.3)
POTASSIUM SERPL-SCNC: 3.6 MMOL/L (ref 3.4–5.3)
POTASSIUM SERPL-SCNC: 3.7 MMOL/L (ref 3.4–5.3)
POTASSIUM SERPL-SCNC: 3.8 MMOL/L (ref 3.4–5.3)
POTASSIUM SERPL-SCNC: 3.9 MMOL/L (ref 3.4–5.3)
POTASSIUM SERPL-SCNC: 3.9 MMOL/L (ref 3.4–5.3)
POTASSIUM SERPL-SCNC: 4.1 MMOL/L (ref 3.4–5.3)
POTASSIUM SERPL-SCNC: 4.2 MMOL/L (ref 3.4–5.3)
POTASSIUM SERPL-SCNC: 4.3 MMOL/L (ref 3.4–5.3)
POTASSIUM SERPL-SCNC: 4.4 MMOL/L (ref 3.4–5.3)
POTASSIUM SERPL-SCNC: 4.4 MMOL/L (ref 3.4–5.3)
POTASSIUM SERPL-SCNC: 4.6 MMOL/L (ref 3.4–5.3)
POTASSIUM SERPL-SCNC: 4.6 MMOL/L (ref 3.4–5.3)
PR INTERVAL - MUSE: 112 MS
PR INTERVAL - MUSE: 120 MS
PR INTERVAL - MUSE: 122 MS
PR INTERVAL - MUSE: 126 MS
PR INTERVAL - MUSE: 130 MS
PR INTERVAL - MUSE: 132 MS
PR INTERVAL - MUSE: 142 MS
PR INTERVAL - MUSE: 150 MS
PR INTERVAL - MUSE: 262 MS
PROCALCITONIN SERPL IA-MCNC: 0.06 NG/ML
PROCALCITONIN SERPL IA-MCNC: 0.07 NG/ML
PROCALCITONIN SERPL IA-MCNC: 0.11 NG/ML
PROCALCITONIN SERPL-MCNC: 0.04 NG/ML (ref 0–0.49)
PROCALCITONIN SERPL-MCNC: 0.2 NG/ML (ref 0–0.49)
PROT SERPL-MCNC: 6.3 G/DL (ref 6.4–8.3)
PROT SERPL-MCNC: 6.3 G/DL (ref 6.4–8.3)
PROT SERPL-MCNC: 6.4 G/DL (ref 6.4–8.3)
PROT SERPL-MCNC: 6.5 G/DL (ref 6.4–8.3)
PROT SERPL-MCNC: 6.5 G/DL (ref 6.4–8.3)
PROT SERPL-MCNC: 6.6 G/DL (ref 6.4–8.3)
PROT SERPL-MCNC: 6.8 G/DL (ref 6–8)
PROT SERPL-MCNC: 7.2 G/DL (ref 6.4–8.3)
PROT SERPL-MCNC: 7.3 G/DL (ref 6.4–8.3)
PROT SERPL-MCNC: 7.5 G/DL (ref 6–8)
PROT SERPL-MCNC: 7.7 G/DL (ref 6.4–8.3)
PROT SERPL-MCNC: 7.7 G/DL (ref 6–8)
QRS DURATION - MUSE: 116 MS
QRS DURATION - MUSE: 122 MS
QRS DURATION - MUSE: 124 MS
QRS DURATION - MUSE: 126 MS
QRS DURATION - MUSE: 126 MS
QRS DURATION - MUSE: 128 MS
QRS DURATION - MUSE: 130 MS
QRS DURATION - MUSE: 134 MS
QRS DURATION - MUSE: 138 MS
QT - MUSE: 380 MS
QT - MUSE: 406 MS
QT - MUSE: 424 MS
QT - MUSE: 426 MS
QT - MUSE: 430 MS
QT - MUSE: 436 MS
QT - MUSE: 438 MS
QT - MUSE: 450 MS
QT - MUSE: 478 MS
QT - MUSE: 490 MS
QT - MUSE: 492 MS
QTC - MUSE: 470 MS
QTC - MUSE: 482 MS
QTC - MUSE: 482 MS
QTC - MUSE: 489 MS
QTC - MUSE: 490 MS
QTC - MUSE: 490 MS
QTC - MUSE: 492 MS
QTC - MUSE: 504 MS
QTC - MUSE: 505 MS
QTC - MUSE: 510 MS
QTC - MUSE: 524 MS
R AXIS - MUSE: -54 DEGREES
R AXIS - MUSE: -58 DEGREES
R AXIS - MUSE: -61 DEGREES
R AXIS - MUSE: -66 DEGREES
R AXIS - MUSE: -67 DEGREES
R AXIS - MUSE: -72 DEGREES
R AXIS - MUSE: -78 DEGREES
R AXIS - MUSE: -79 DEGREES
R AXIS - MUSE: -79 DEGREES
R AXIS - MUSE: -80 DEGREES
R AXIS - MUSE: 10 DEGREES
RADIOLOGIST FLAGS: ABNORMAL
RBC # BLD AUTO: 3.02 10E6/UL (ref 3.8–5.2)
RBC # BLD AUTO: 3.07 10E6/UL (ref 3.8–5.2)
RBC # BLD AUTO: 3.21 10E6/UL (ref 3.8–5.2)
RBC # BLD AUTO: 3.24 10E6/UL (ref 3.8–5.2)
RBC # BLD AUTO: 3.31 10E6/UL (ref 3.8–5.2)
RBC # BLD AUTO: 3.37 10E6/UL (ref 3.8–5.2)
RBC # BLD AUTO: 3.46 10E6/UL (ref 3.8–5.2)
RBC # BLD AUTO: 3.54 10E6/UL (ref 3.8–5.2)
RBC # BLD AUTO: 3.58 10E6/UL (ref 3.8–5.2)
RBC # BLD AUTO: 3.6 10E6/UL (ref 3.8–5.2)
RBC # BLD AUTO: 3.65 10E6/UL (ref 3.8–5.2)
RBC # BLD AUTO: 3.66 10E6/UL (ref 3.8–5.2)
RBC # BLD AUTO: 3.71 10E6/UL (ref 3.8–5.2)
RBC # BLD AUTO: 3.71 10E6/UL (ref 3.8–5.2)
RBC # BLD AUTO: 3.77 10E6/UL (ref 3.8–5.2)
RBC # BLD AUTO: 3.78 10E6/UL (ref 3.8–5.2)
RBC # BLD AUTO: 3.83 10E6/UL (ref 3.8–5.2)
RBC # BLD AUTO: 3.85 10E6/UL (ref 3.8–5.2)
RBC # BLD AUTO: 3.87 10E6/UL (ref 3.8–5.2)
RBC # BLD AUTO: 3.87 10E6/UL (ref 3.8–5.2)
RBC # BLD AUTO: 3.89 10E6/UL (ref 3.8–5.2)
RBC # BLD AUTO: 3.94 10E6/UL (ref 3.8–5.2)
RBC # BLD AUTO: 4.08 10E6/UL (ref 3.8–5.2)
RBC # BLD AUTO: 4.12 10E6/UL (ref 3.8–5.2)
RBC # BLD AUTO: 4.16 10E6/UL (ref 3.8–5.2)
RBC # BLD AUTO: 4.17 10E6/UL (ref 3.8–5.2)
RBC # BLD AUTO: 4.24 10E6/UL (ref 3.8–5.2)
RBC # BLD AUTO: 4.35 10E6/UL (ref 3.8–5.2)
RBC # BLD AUTO: 4.42 10E6/UL (ref 3.8–5.2)
RBC # BLD AUTO: 4.82 10E6/UL (ref 3.8–5.2)
RBC # BLD AUTO: 4.87 10E6/UL (ref 3.8–5.2)
RBC # BLD AUTO: 4.87 10E6/UL (ref 3.8–5.2)
RBC MORPH BLD: ABNORMAL
RBC MORPH BLD: ABNORMAL
RBC URINE: 1 /HPF
RBC URINE: 1 /HPF
RBC URINE: <1 /HPF
RSV RNA SPEC NAA+PROBE: NEGATIVE
SA TARGET DNA: NEGATIVE
SAO2 % BLDV: 22.2 % (ref 70–75)
SAO2 % BLDV: 26.7 % (ref 70–75)
SAO2 % BLDV: 30.3 % (ref 70–75)
SAO2 % BLDV: 49.1 % (ref 70–75)
SARS-COV-2 RNA RESP QL NAA+PROBE: NEGATIVE
SCANNED LAB RESULT: NORMAL
SIGNIFICANT RESULTS: NORMAL
SODIUM SERPL-SCNC: 136 MMOL/L (ref 136–145)
SODIUM SERPL-SCNC: 137 MMOL/L (ref 136–145)
SODIUM SERPL-SCNC: 138 MMOL/L (ref 136–145)
SODIUM SERPL-SCNC: 139 MMOL/L (ref 135–145)
SODIUM SERPL-SCNC: 139 MMOL/L (ref 135–145)
SODIUM SERPL-SCNC: 139 MMOL/L (ref 136–145)
SODIUM SERPL-SCNC: 139 MMOL/L (ref 136–145)
SODIUM SERPL-SCNC: 140 MMOL/L (ref 135–145)
SODIUM SERPL-SCNC: 140 MMOL/L (ref 136–145)
SODIUM SERPL-SCNC: 141 MMOL/L (ref 135–145)
SODIUM SERPL-SCNC: 141 MMOL/L (ref 136–145)
SODIUM SERPL-SCNC: 142 MMOL/L (ref 135–145)
SODIUM SERPL-SCNC: 142 MMOL/L (ref 135–145)
SODIUM SERPL-SCNC: 142 MMOL/L (ref 136–145)
SODIUM SERPL-SCNC: 143 MMOL/L (ref 135–145)
SODIUM SERPL-SCNC: 143 MMOL/L (ref 136–145)
SODIUM SERPL-SCNC: 143 MMOL/L (ref 136–145)
SODIUM SERPL-SCNC: 144 MMOL/L (ref 135–145)
SODIUM SERPL-SCNC: 144 MMOL/L (ref 136–145)
SODIUM SERPL-SCNC: 146 MMOL/L (ref 136–145)
SODIUM SERPL-SCNC: 147 MMOL/L (ref 135–145)
SP GR UR STRIP: 1 (ref 1–1.03)
SP GR UR STRIP: 1.02 (ref 1–1.03)
SP GR UR STRIP: 1.02 (ref 1–1.03)
SPECIMEN DESCRIPTION: NORMAL
SPECIMEN DESCRIPTION: NORMAL
SQUAMOUS EPITHELIAL: 1 /HPF
SQUAMOUS EPITHELIAL: 1 /HPF
SQUAMOUS EPITHELIAL: <1 /HPF
SYSTOLIC BLOOD PRESSURE - MUSE: 131 MMHG
SYSTOLIC BLOOD PRESSURE - MUSE: 134 MMHG
SYSTOLIC BLOOD PRESSURE - MUSE: 201 MMHG
SYSTOLIC BLOOD PRESSURE - MUSE: 90 MMHG
SYSTOLIC BLOOD PRESSURE - MUSE: NORMAL MMHG
T AXIS - MUSE: 100 DEGREES
T AXIS - MUSE: 102 DEGREES
T AXIS - MUSE: 107 DEGREES
T AXIS - MUSE: 112 DEGREES
T AXIS - MUSE: 114 DEGREES
T AXIS - MUSE: 114 DEGREES
T AXIS - MUSE: 116 DEGREES
T AXIS - MUSE: 160 DEGREES
T AXIS - MUSE: 87 DEGREES
T AXIS - MUSE: 95 DEGREES
T AXIS - MUSE: 97 DEGREES
TEST DETAILS, MDL: NORMAL
TROPONIN I SERPL-MCNC: 0.02 NG/ML (ref 0–0.29)
TROPONIN I SERPL-MCNC: 0.04 NG/ML (ref 0–0.29)
TROPONIN I SERPL-MCNC: 0.07 NG/ML (ref 0–0.29)
TROPONIN I SERPL-MCNC: 0.07 NG/ML (ref 0–0.29)
TROPONIN T SERPL HS-MCNC: 18 NG/L
TROPONIN T SERPL HS-MCNC: 22 NG/L
TROPONIN T SERPL HS-MCNC: 27 NG/L
TROPONIN T SERPL HS-MCNC: 31 NG/L
TROPONIN T SERPL HS-MCNC: 38 NG/L
TROPONIN T SERPL HS-MCNC: 43 NG/L
TROPONIN T SERPL HS-MCNC: 44 NG/L
TROPONIN T SERPL HS-MCNC: 44 NG/L
TROPONIN T SERPL HS-MCNC: 50 NG/L
UROBILINOGEN UR STRIP-MCNC: <2 MG/DL
VANCOMYCIN SERPL-MCNC: 21.9 MG/L
VENTRICULAR RATE- MUSE: 100 BPM
VENTRICULAR RATE- MUSE: 60 BPM
VENTRICULAR RATE- MUSE: 63 BPM
VENTRICULAR RATE- MUSE: 64 BPM
VENTRICULAR RATE- MUSE: 69 BPM
VENTRICULAR RATE- MUSE: 72 BPM
VENTRICULAR RATE- MUSE: 76 BPM
VENTRICULAR RATE- MUSE: 77 BPM
VENTRICULAR RATE- MUSE: 85 BPM
VENTRICULAR RATE- MUSE: 86 BPM
VENTRICULAR RATE- MUSE: 95 BPM
WBC # BLD AUTO: 10.6 10E3/UL (ref 4–11)
WBC # BLD AUTO: 10.8 10E3/UL (ref 4–11)
WBC # BLD AUTO: 13 10E3/UL (ref 4–11)
WBC # BLD AUTO: 13.2 10E3/UL (ref 4–11)
WBC # BLD AUTO: 13.2 10E3/UL (ref 4–11)
WBC # BLD AUTO: 13.4 10E3/UL (ref 4–11)
WBC # BLD AUTO: 13.7 10E3/UL (ref 4–11)
WBC # BLD AUTO: 13.8 10E3/UL (ref 4–11)
WBC # BLD AUTO: 14.9 10E3/UL (ref 4–11)
WBC # BLD AUTO: 15 10E3/UL (ref 4–11)
WBC # BLD AUTO: 15.7 10E3/UL (ref 4–11)
WBC # BLD AUTO: 15.9 10E3/UL (ref 4–11)
WBC # BLD AUTO: 18.9 10E3/UL (ref 4–11)
WBC # BLD AUTO: 20.4 10E3/UL (ref 4–11)
WBC # BLD AUTO: 21.2 10E3/UL (ref 4–11)
WBC # BLD AUTO: 22.7 10E3/UL (ref 4–11)
WBC # BLD AUTO: 23.2 10E3/UL (ref 4–11)
WBC # BLD AUTO: 25.1 10E3/UL (ref 4–11)
WBC # BLD AUTO: 25.8 10E3/UL (ref 4–11)
WBC # BLD AUTO: 26.4 10E3/UL (ref 4–11)
WBC # BLD AUTO: 39.5 10E3/UL (ref 4–11)
WBC # BLD AUTO: 39.7 10E3/UL (ref 4–11)
WBC # BLD AUTO: 44.6 10E3/UL (ref 4–11)
WBC # BLD AUTO: 8.1 10E3/UL (ref 4–11)
WBC # BLD AUTO: 8.2 10E3/UL (ref 4–11)
WBC # BLD AUTO: 8.3 10E3/UL (ref 4–11)
WBC # BLD AUTO: 8.4 10E3/UL (ref 4–11)
WBC # BLD AUTO: 9.2 10E3/UL (ref 4–11)
WBC # BLD AUTO: 9.3 10E3/UL (ref 4–11)
WBC # BLD AUTO: 9.4 10E3/UL (ref 4–11)
WBC # BLD AUTO: 9.6 10E3/UL (ref 4–11)
WBC # BLD AUTO: 9.8 10E3/UL (ref 4–11)
WBC URINE: 1 /HPF
WBC URINE: 2 /HPF
WBC URINE: <1 /HPF

## 2023-01-01 PROCEDURE — 250N000013 HC RX MED GY IP 250 OP 250 PS 637

## 2023-01-01 PROCEDURE — 83735 ASSAY OF MAGNESIUM: CPT | Performed by: EMERGENCY MEDICINE

## 2023-01-01 PROCEDURE — 94640 AIRWAY INHALATION TREATMENT: CPT

## 2023-01-01 PROCEDURE — 250N000013 HC RX MED GY IP 250 OP 250 PS 637: Performed by: INTERNAL MEDICINE

## 2023-01-01 PROCEDURE — 99223 1ST HOSP IP/OBS HIGH 75: CPT | Mod: AI | Performed by: INTERNAL MEDICINE

## 2023-01-01 PROCEDURE — 80053 COMPREHEN METABOLIC PANEL: CPT

## 2023-01-01 PROCEDURE — 97116 GAIT TRAINING THERAPY: CPT | Mod: GP

## 2023-01-01 PROCEDURE — 99233 SBSQ HOSP IP/OBS HIGH 50: CPT | Performed by: INTERNAL MEDICINE

## 2023-01-01 PROCEDURE — 86140 C-REACTIVE PROTEIN: CPT | Performed by: STUDENT IN AN ORGANIZED HEALTH CARE EDUCATION/TRAINING PROGRAM

## 2023-01-01 PROCEDURE — 999N000157 HC STATISTIC RCP TIME EA 10 MIN

## 2023-01-01 PROCEDURE — 94660 CPAP INITIATION&MGMT: CPT

## 2023-01-01 PROCEDURE — 99223 1ST HOSP IP/OBS HIGH 75: CPT | Performed by: CLINICAL NURSE SPECIALIST

## 2023-01-01 PROCEDURE — 84484 ASSAY OF TROPONIN QUANT: CPT | Performed by: STUDENT IN AN ORGANIZED HEALTH CARE EDUCATION/TRAINING PROGRAM

## 2023-01-01 PROCEDURE — 36415 COLL VENOUS BLD VENIPUNCTURE: CPT | Performed by: INTERNAL MEDICINE

## 2023-01-01 PROCEDURE — 97162 PT EVAL MOD COMPLEX 30 MIN: CPT | Mod: GP

## 2023-01-01 PROCEDURE — 250N000011 HC RX IP 250 OP 636: Performed by: CLINICAL NURSE SPECIALIST

## 2023-01-01 PROCEDURE — 99232 SBSQ HOSP IP/OBS MODERATE 35: CPT | Performed by: INTERNAL MEDICINE

## 2023-01-01 PROCEDURE — 250N000012 HC RX MED GY IP 250 OP 636 PS 637

## 2023-01-01 PROCEDURE — 83880 ASSAY OF NATRIURETIC PEPTIDE: CPT | Performed by: EMERGENCY MEDICINE

## 2023-01-01 PROCEDURE — 99309 SBSQ NF CARE MODERATE MDM 30: CPT | Performed by: NURSE PRACTITIONER

## 2023-01-01 PROCEDURE — 82310 ASSAY OF CALCIUM: CPT | Performed by: STUDENT IN AN ORGANIZED HEALTH CARE EDUCATION/TRAINING PROGRAM

## 2023-01-01 PROCEDURE — 93005 ELECTROCARDIOGRAM TRACING: CPT | Performed by: EMERGENCY MEDICINE

## 2023-01-01 PROCEDURE — 250N000013 HC RX MED GY IP 250 OP 250 PS 637: Performed by: STUDENT IN AN ORGANIZED HEALTH CARE EDUCATION/TRAINING PROGRAM

## 2023-01-01 PROCEDURE — 84145 PROCALCITONIN (PCT): CPT | Performed by: STUDENT IN AN ORGANIZED HEALTH CARE EDUCATION/TRAINING PROGRAM

## 2023-01-01 PROCEDURE — 250N000009 HC RX 250

## 2023-01-01 PROCEDURE — 85027 COMPLETE CBC AUTOMATED: CPT

## 2023-01-01 PROCEDURE — 99215 OFFICE O/P EST HI 40 MIN: CPT | Mod: VID | Performed by: INTERNAL MEDICINE

## 2023-01-01 PROCEDURE — 96375 TX/PRO/DX INJ NEW DRUG ADDON: CPT

## 2023-01-01 PROCEDURE — A9540 TC99M MAA: HCPCS | Performed by: INTERNAL MEDICINE

## 2023-01-01 PROCEDURE — 83605 ASSAY OF LACTIC ACID: CPT | Performed by: EMERGENCY MEDICINE

## 2023-01-01 PROCEDURE — 255N000002 HC RX 255 OP 636: Performed by: INTERNAL MEDICINE

## 2023-01-01 PROCEDURE — 999N000032 HC STATISTIC CHRONIC DISEASE SPECIALIST RT CONSULT

## 2023-01-01 PROCEDURE — 36415 COLL VENOUS BLD VENIPUNCTURE: CPT | Performed by: EMERGENCY MEDICINE

## 2023-01-01 PROCEDURE — 99232 SBSQ HOSP IP/OBS MODERATE 35: CPT | Performed by: HOSPITALIST

## 2023-01-01 PROCEDURE — 250N000013 HC RX MED GY IP 250 OP 250 PS 637: Performed by: FAMILY MEDICINE

## 2023-01-01 PROCEDURE — 96376 TX/PRO/DX INJ SAME DRUG ADON: CPT

## 2023-01-01 PROCEDURE — 82805 BLOOD GASES W/O2 SATURATION: CPT | Performed by: STUDENT IN AN ORGANIZED HEALTH CARE EDUCATION/TRAINING PROGRAM

## 2023-01-01 PROCEDURE — 250N000012 HC RX MED GY IP 250 OP 636 PS 637: Performed by: INTERNAL MEDICINE

## 2023-01-01 PROCEDURE — G0463 HOSPITAL OUTPT CLINIC VISIT: HCPCS

## 2023-01-01 PROCEDURE — 99215 OFFICE O/P EST HI 40 MIN: CPT | Mod: 25 | Performed by: FAMILY MEDICINE

## 2023-01-01 PROCEDURE — 76705 ECHO EXAM OF ABDOMEN: CPT

## 2023-01-01 PROCEDURE — 250N000009 HC RX 250: Performed by: STUDENT IN AN ORGANIZED HEALTH CARE EDUCATION/TRAINING PROGRAM

## 2023-01-01 PROCEDURE — 87637 SARSCOV2&INF A&B&RSV AMP PRB: CPT | Performed by: EMERGENCY MEDICINE

## 2023-01-01 PROCEDURE — 94799 UNLISTED PULMONARY SVC/PX: CPT

## 2023-01-01 PROCEDURE — 250N000009 HC RX 250: Performed by: INTERNAL MEDICINE

## 2023-01-01 PROCEDURE — 85610 PROTHROMBIN TIME: CPT | Performed by: EMERGENCY MEDICINE

## 2023-01-01 PROCEDURE — 78580 LUNG PERFUSION IMAGING: CPT | Mod: MG

## 2023-01-01 PROCEDURE — 85025 COMPLETE CBC W/AUTO DIFF WBC: CPT | Performed by: STUDENT IN AN ORGANIZED HEALTH CARE EDUCATION/TRAINING PROGRAM

## 2023-01-01 PROCEDURE — 97535 SELF CARE MNGMENT TRAINING: CPT | Mod: GO

## 2023-01-01 PROCEDURE — 258N000003 HC RX IP 258 OP 636

## 2023-01-01 PROCEDURE — 99233 SBSQ HOSP IP/OBS HIGH 50: CPT | Performed by: CLINICAL NURSE SPECIALIST

## 2023-01-01 PROCEDURE — 83735 ASSAY OF MAGNESIUM: CPT | Performed by: INTERNAL MEDICINE

## 2023-01-01 PROCEDURE — 36415 COLL VENOUS BLD VENIPUNCTURE: CPT | Performed by: STUDENT IN AN ORGANIZED HEALTH CARE EDUCATION/TRAINING PROGRAM

## 2023-01-01 PROCEDURE — 97116 GAIT TRAINING THERAPY: CPT | Mod: GP | Performed by: PHYSICAL THERAPIST

## 2023-01-01 PROCEDURE — 85014 HEMATOCRIT: CPT | Performed by: INTERNAL MEDICINE

## 2023-01-01 PROCEDURE — 99223 1ST HOSP IP/OBS HIGH 75: CPT | Mod: AI

## 2023-01-01 PROCEDURE — 82310 ASSAY OF CALCIUM: CPT | Performed by: INTERNAL MEDICINE

## 2023-01-01 PROCEDURE — 99233 SBSQ HOSP IP/OBS HIGH 50: CPT | Mod: GC

## 2023-01-01 PROCEDURE — 99231 SBSQ HOSP IP/OBS SF/LOW 25: CPT | Performed by: INTERNAL MEDICINE

## 2023-01-01 PROCEDURE — 82040 ASSAY OF SERUM ALBUMIN: CPT | Performed by: FAMILY MEDICINE

## 2023-01-01 PROCEDURE — 99215 OFFICE O/P EST HI 40 MIN: CPT | Performed by: INTERNAL MEDICINE

## 2023-01-01 PROCEDURE — 97166 OT EVAL MOD COMPLEX 45 MIN: CPT | Mod: GO

## 2023-01-01 PROCEDURE — 250N000011 HC RX IP 250 OP 636: Performed by: INTERNAL MEDICINE

## 2023-01-01 PROCEDURE — 80048 BASIC METABOLIC PNL TOTAL CA: CPT

## 2023-01-01 PROCEDURE — A9585 GADOBUTROL INJECTION: HCPCS | Mod: JZ | Performed by: FAMILY MEDICINE

## 2023-01-01 PROCEDURE — G1010 CDSM STANSON: HCPCS

## 2023-01-01 PROCEDURE — 82962 GLUCOSE BLOOD TEST: CPT | Mod: GZ

## 2023-01-01 PROCEDURE — 85025 COMPLETE CBC W/AUTO DIFF WBC: CPT | Performed by: EMERGENCY MEDICINE

## 2023-01-01 PROCEDURE — 36415 COLL VENOUS BLD VENIPUNCTURE: CPT

## 2023-01-01 PROCEDURE — 97110 THERAPEUTIC EXERCISES: CPT | Mod: GO

## 2023-01-01 PROCEDURE — 120N000001 HC R&B MED SURG/OB

## 2023-01-01 PROCEDURE — 99223 1ST HOSP IP/OBS HIGH 75: CPT | Performed by: INTERNAL MEDICINE

## 2023-01-01 PROCEDURE — 93010 ELECTROCARDIOGRAM REPORT: CPT | Mod: HIP | Performed by: INTERNAL MEDICINE

## 2023-01-01 PROCEDURE — 250N000011 HC RX IP 250 OP 636: Mod: JZ | Performed by: STUDENT IN AN ORGANIZED HEALTH CARE EDUCATION/TRAINING PROGRAM

## 2023-01-01 PROCEDURE — 88333 PATH CONSLTJ SURG CYTO XM 1: CPT | Mod: 26 | Performed by: PATHOLOGY

## 2023-01-01 PROCEDURE — 80053 COMPREHEN METABOLIC PANEL: CPT | Performed by: EMERGENCY MEDICINE

## 2023-01-01 PROCEDURE — 250N000011 HC RX IP 250 OP 636: Mod: JZ | Performed by: EMERGENCY MEDICINE

## 2023-01-01 PROCEDURE — 84484 ASSAY OF TROPONIN QUANT: CPT | Performed by: INTERNAL MEDICINE

## 2023-01-01 PROCEDURE — 80048 BASIC METABOLIC PNL TOTAL CA: CPT | Performed by: INTERNAL MEDICINE

## 2023-01-01 PROCEDURE — 250N000011 HC RX IP 250 OP 636: Performed by: EMERGENCY MEDICINE

## 2023-01-01 PROCEDURE — 72158 MRI LUMBAR SPINE W/O & W/DYE: CPT | Mod: ME

## 2023-01-01 PROCEDURE — 96374 THER/PROPH/DIAG INJ IV PUSH: CPT | Mod: 59

## 2023-01-01 PROCEDURE — 71275 CT ANGIOGRAPHY CHEST: CPT | Mod: MA

## 2023-01-01 PROCEDURE — 99232 SBSQ HOSP IP/OBS MODERATE 35: CPT | Mod: GC | Performed by: FAMILY MEDICINE

## 2023-01-01 PROCEDURE — 258N000003 HC RX IP 258 OP 636: Performed by: EMERGENCY MEDICINE

## 2023-01-01 PROCEDURE — 71250 CT THORAX DX C-: CPT | Mod: ME

## 2023-01-01 PROCEDURE — 99418 PROLNG IP/OBS E/M EA 15 MIN: CPT | Performed by: CLINICAL NURSE SPECIALIST

## 2023-01-01 PROCEDURE — 99309 SBSQ NF CARE MODERATE MDM 30: CPT

## 2023-01-01 PROCEDURE — 250N000009 HC RX 250: Performed by: FAMILY MEDICINE

## 2023-01-01 PROCEDURE — 99285 EMERGENCY DEPT VISIT HI MDM: CPT | Mod: 25

## 2023-01-01 PROCEDURE — 36569 INSJ PICC 5 YR+ W/O IMAGING: CPT

## 2023-01-01 PROCEDURE — 250N000012 HC RX MED GY IP 250 OP 636 PS 637: Performed by: STUDENT IN AN ORGANIZED HEALTH CARE EDUCATION/TRAINING PROGRAM

## 2023-01-01 PROCEDURE — 88360 TUMOR IMMUNOHISTOCHEM/MANUAL: CPT | Mod: 26 | Performed by: PATHOLOGY

## 2023-01-01 PROCEDURE — 99233 SBSQ HOSP IP/OBS HIGH 50: CPT | Performed by: STUDENT IN AN ORGANIZED HEALTH CARE EDUCATION/TRAINING PROGRAM

## 2023-01-01 PROCEDURE — 87637 SARSCOV2&INF A&B&RSV AMP PRB: CPT

## 2023-01-01 PROCEDURE — 74177 CT ABD & PELVIS W/CONTRAST: CPT | Mod: MA

## 2023-01-01 PROCEDURE — G0463 HOSPITAL OUTPT CLINIC VISIT: HCPCS | Mod: 25

## 2023-01-01 PROCEDURE — 85027 COMPLETE CBC AUTOMATED: CPT | Performed by: STUDENT IN AN ORGANIZED HEALTH CARE EDUCATION/TRAINING PROGRAM

## 2023-01-01 PROCEDURE — 71100 X-RAY EXAM RIBS UNI 2 VIEWS: CPT | Mod: LT

## 2023-01-01 PROCEDURE — 0JB73ZX EXCISION OF BACK SUBCUTANEOUS TISSUE AND FASCIA, PERCUTANEOUS APPROACH, DIAGNOSTIC: ICD-10-PCS | Performed by: INTERNAL MEDICINE

## 2023-01-01 PROCEDURE — 250N000011 HC RX IP 250 OP 636: Performed by: STUDENT IN AN ORGANIZED HEALTH CARE EDUCATION/TRAINING PROGRAM

## 2023-01-01 PROCEDURE — 74018 RADEX ABDOMEN 1 VIEW: CPT

## 2023-01-01 PROCEDURE — 84132 ASSAY OF SERUM POTASSIUM: CPT | Performed by: INTERNAL MEDICINE

## 2023-01-01 PROCEDURE — 250N000013 HC RX MED GY IP 250 OP 250 PS 637: Performed by: HOSPITALIST

## 2023-01-01 PROCEDURE — 97530 THERAPEUTIC ACTIVITIES: CPT | Mod: GP | Performed by: PHYSICAL THERAPIST

## 2023-01-01 PROCEDURE — 84484 ASSAY OF TROPONIN QUANT: CPT | Performed by: EMERGENCY MEDICINE

## 2023-01-01 PROCEDURE — 99309 SBSQ NF CARE MODERATE MDM 30: CPT | Performed by: FAMILY MEDICINE

## 2023-01-01 PROCEDURE — 250N000011 HC RX IP 250 OP 636: Mod: JZ | Performed by: INTERNAL MEDICINE

## 2023-01-01 PROCEDURE — 84484 ASSAY OF TROPONIN QUANT: CPT | Mod: 91 | Performed by: INTERNAL MEDICINE

## 2023-01-01 PROCEDURE — 84132 ASSAY OF SERUM POTASSIUM: CPT

## 2023-01-01 PROCEDURE — 85049 AUTOMATED PLATELET COUNT: CPT | Performed by: STUDENT IN AN ORGANIZED HEALTH CARE EDUCATION/TRAINING PROGRAM

## 2023-01-01 PROCEDURE — 93005 ELECTROCARDIOGRAM TRACING: CPT | Performed by: FAMILY MEDICINE

## 2023-01-01 PROCEDURE — 87040 BLOOD CULTURE FOR BACTERIA: CPT | Performed by: EMERGENCY MEDICINE

## 2023-01-01 PROCEDURE — 77062 BREAST TOMOSYNTHESIS BI: CPT

## 2023-01-01 PROCEDURE — 255N000002 HC RX 255 OP 636: Mod: JZ | Performed by: FAMILY MEDICINE

## 2023-01-01 PROCEDURE — 84484 ASSAY OF TROPONIN QUANT: CPT

## 2023-01-01 PROCEDURE — 36415 COLL VENOUS BLD VENIPUNCTURE: CPT | Performed by: FAMILY MEDICINE

## 2023-01-01 PROCEDURE — 99497 ADVNCD CARE PLAN 30 MIN: CPT | Performed by: FAMILY MEDICINE

## 2023-01-01 PROCEDURE — 99215 OFFICE O/P EST HI 40 MIN: CPT | Mod: 25 | Performed by: NURSE PRACTITIONER

## 2023-01-01 PROCEDURE — 81001 URINALYSIS AUTO W/SCOPE: CPT | Performed by: EMERGENCY MEDICINE

## 2023-01-01 PROCEDURE — P9604 ONE-WAY ALLOW PRORATED TRIP: HCPCS | Performed by: FAMILY MEDICINE

## 2023-01-01 PROCEDURE — 96365 THER/PROPH/DIAG IV INF INIT: CPT

## 2023-01-01 PROCEDURE — A9585 GADOBUTROL INJECTION: HCPCS | Performed by: INTERNAL MEDICINE

## 2023-01-01 PROCEDURE — 82805 BLOOD GASES W/O2 SATURATION: CPT | Performed by: EMERGENCY MEDICINE

## 2023-01-01 PROCEDURE — 97530 THERAPEUTIC ACTIVITIES: CPT | Mod: GP

## 2023-01-01 PROCEDURE — 93005 ELECTROCARDIOGRAM TRACING: CPT

## 2023-01-01 PROCEDURE — 94640 AIRWAY INHALATION TREATMENT: CPT | Mod: 76

## 2023-01-01 PROCEDURE — 83690 ASSAY OF LIPASE: CPT | Performed by: EMERGENCY MEDICINE

## 2023-01-01 PROCEDURE — 71045 X-RAY EXAM CHEST 1 VIEW: CPT

## 2023-01-01 PROCEDURE — 99238 HOSP IP/OBS DSCHRG MGMT 30/<: CPT | Mod: GC

## 2023-01-01 PROCEDURE — 83880 ASSAY OF NATRIURETIC PEPTIDE: CPT | Performed by: INTERNAL MEDICINE

## 2023-01-01 PROCEDURE — 250N000011 HC RX IP 250 OP 636: Mod: JZ | Performed by: FAMILY MEDICINE

## 2023-01-01 PROCEDURE — 82310 ASSAY OF CALCIUM: CPT

## 2023-01-01 PROCEDURE — 343N000001 HC RX 343: Performed by: INTERNAL MEDICINE

## 2023-01-01 PROCEDURE — 99207 PR APP CREDIT; MD BILLING SHARED VISIT: CPT | Mod: FS | Performed by: FAMILY MEDICINE

## 2023-01-01 PROCEDURE — 99221 1ST HOSP IP/OBS SF/LOW 40: CPT | Performed by: SURGERY

## 2023-01-01 PROCEDURE — 84132 ASSAY OF SERUM POTASSIUM: CPT | Performed by: STUDENT IN AN ORGANIZED HEALTH CARE EDUCATION/TRAINING PROGRAM

## 2023-01-01 PROCEDURE — 999N000065 XR CHEST PORT 1 VIEW

## 2023-01-01 PROCEDURE — 71046 X-RAY EXAM CHEST 2 VIEWS: CPT

## 2023-01-01 PROCEDURE — 83605 ASSAY OF LACTIC ACID: CPT | Performed by: INTERNAL MEDICINE

## 2023-01-01 PROCEDURE — 78580 LUNG PERFUSION IMAGING: CPT | Mod: ME

## 2023-01-01 PROCEDURE — 80048 BASIC METABOLIC PNL TOTAL CA: CPT | Performed by: STUDENT IN AN ORGANIZED HEALTH CARE EDUCATION/TRAINING PROGRAM

## 2023-01-01 PROCEDURE — 82565 ASSAY OF CREATININE: CPT | Performed by: STUDENT IN AN ORGANIZED HEALTH CARE EDUCATION/TRAINING PROGRAM

## 2023-01-01 PROCEDURE — 250N000013 HC RX MED GY IP 250 OP 250 PS 637: Performed by: CLINICAL NURSE SPECIALIST

## 2023-01-01 PROCEDURE — A9540 TC99M MAA: HCPCS | Performed by: FAMILY MEDICINE

## 2023-01-01 PROCEDURE — 200N000001 HC R&B ICU

## 2023-01-01 PROCEDURE — 99215 OFFICE O/P EST HI 40 MIN: CPT | Mod: 95 | Performed by: INTERNAL MEDICINE

## 2023-01-01 PROCEDURE — 99232 SBSQ HOSP IP/OBS MODERATE 35: CPT | Mod: GC

## 2023-01-01 PROCEDURE — 99443 PR PHYSICIAN TELEPHONE EVALUATION 21-30 MIN: CPT | Performed by: NURSE PRACTITIONER

## 2023-01-01 PROCEDURE — 99215 OFFICE O/P EST HI 40 MIN: CPT | Mod: VID | Performed by: FAMILY MEDICINE

## 2023-01-01 PROCEDURE — 272N000452 HC KIT SHRLOCK 5FR POWER PICC TRIPLE LUMEN

## 2023-01-01 PROCEDURE — 85025 COMPLETE CBC W/AUTO DIFF WBC: CPT

## 2023-01-01 PROCEDURE — 83735 ASSAY OF MAGNESIUM: CPT | Performed by: STUDENT IN AN ORGANIZED HEALTH CARE EDUCATION/TRAINING PROGRAM

## 2023-01-01 PROCEDURE — 250N000011 HC RX IP 250 OP 636: Mod: JZ

## 2023-01-01 PROCEDURE — 250N000009 HC RX 250: Performed by: EMERGENCY MEDICINE

## 2023-01-01 PROCEDURE — G0452 MOLECULAR PATHOLOGY INTERPR: HCPCS | Mod: 26 | Performed by: STUDENT IN AN ORGANIZED HEALTH CARE EDUCATION/TRAINING PROGRAM

## 2023-01-01 PROCEDURE — 93970 EXTREMITY STUDY: CPT

## 2023-01-01 PROCEDURE — 88333 PATH CONSLTJ SURG CYTO XM 1: CPT | Mod: TC | Performed by: FAMILY MEDICINE

## 2023-01-01 PROCEDURE — 80053 COMPREHEN METABOLIC PANEL: CPT | Performed by: FAMILY MEDICINE

## 2023-01-01 PROCEDURE — G0463 HOSPITAL OUTPT CLINIC VISIT: HCPCS | Mod: PN,GT | Performed by: FAMILY MEDICINE

## 2023-01-01 PROCEDURE — 99214 OFFICE O/P EST MOD 30 MIN: CPT | Mod: VID | Performed by: INTERNAL MEDICINE

## 2023-01-01 PROCEDURE — 99222 1ST HOSP IP/OBS MODERATE 55: CPT | Mod: 25 | Performed by: INTERNAL MEDICINE

## 2023-01-01 PROCEDURE — 99238 HOSP IP/OBS DSCHRG MGMT 30/<: CPT | Performed by: HOSPITALIST

## 2023-01-01 PROCEDURE — 88342 IMHCHEM/IMCYTCHM 1ST ANTB: CPT | Mod: TC | Performed by: FAMILY MEDICINE

## 2023-01-01 PROCEDURE — 85007 BL SMEAR W/DIFF WBC COUNT: CPT | Performed by: EMERGENCY MEDICINE

## 2023-01-01 PROCEDURE — 36415 COLL VENOUS BLD VENIPUNCTURE: CPT | Performed by: NURSE PRACTITIONER

## 2023-01-01 PROCEDURE — 250N000013 HC RX MED GY IP 250 OP 250 PS 637: Performed by: EMERGENCY MEDICINE

## 2023-01-01 PROCEDURE — 96361 HYDRATE IV INFUSION ADD-ON: CPT

## 2023-01-01 PROCEDURE — 83880 ASSAY OF NATRIURETIC PEPTIDE: CPT | Performed by: STUDENT IN AN ORGANIZED HEALTH CARE EDUCATION/TRAINING PROGRAM

## 2023-01-01 PROCEDURE — 99223 1ST HOSP IP/OBS HIGH 75: CPT | Mod: AI | Performed by: STUDENT IN AN ORGANIZED HEALTH CARE EDUCATION/TRAINING PROGRAM

## 2023-01-01 PROCEDURE — 96374 THER/PROPH/DIAG INJ IV PUSH: CPT

## 2023-01-01 PROCEDURE — 99221 1ST HOSP IP/OBS SF/LOW 40: CPT | Performed by: PHYSICIAN ASSISTANT

## 2023-01-01 PROCEDURE — 250N000011 HC RX IP 250 OP 636

## 2023-01-01 PROCEDURE — 99214 OFFICE O/P EST MOD 30 MIN: CPT | Mod: 95 | Performed by: FAMILY MEDICINE

## 2023-01-01 PROCEDURE — 99443 PR PHYSICIAN TELEPHONE EVALUATION 21-30 MIN: CPT | Mod: 95 | Performed by: INTERNAL MEDICINE

## 2023-01-01 PROCEDURE — 70553 MRI BRAIN STEM W/O & W/DYE: CPT | Mod: MG

## 2023-01-01 PROCEDURE — 99222 1ST HOSP IP/OBS MODERATE 55: CPT | Performed by: INTERNAL MEDICINE

## 2023-01-01 PROCEDURE — 999N000156 HC STATISTIC RCP CONSULT EA 30 MIN

## 2023-01-01 PROCEDURE — 83735 ASSAY OF MAGNESIUM: CPT | Performed by: FAMILY MEDICINE

## 2023-01-01 PROCEDURE — 82310 ASSAY OF CALCIUM: CPT | Performed by: EMERGENCY MEDICINE

## 2023-01-01 PROCEDURE — 93971 EXTREMITY STUDY: CPT | Mod: LT

## 2023-01-01 PROCEDURE — 99222 1ST HOSP IP/OBS MODERATE 55: CPT | Mod: AI | Performed by: FAMILY MEDICINE

## 2023-01-01 PROCEDURE — 99223 1ST HOSP IP/OBS HIGH 75: CPT | Performed by: STUDENT IN AN ORGANIZED HEALTH CARE EDUCATION/TRAINING PROGRAM

## 2023-01-01 PROCEDURE — 97110 THERAPEUTIC EXERCISES: CPT | Mod: GP

## 2023-01-01 PROCEDURE — 81455 SO/HL 51/>GSAP DNA/DNA&RNA: CPT | Performed by: FAMILY MEDICINE

## 2023-01-01 PROCEDURE — 85025 COMPLETE CBC W/AUTO DIFF WBC: CPT | Performed by: INTERNAL MEDICINE

## 2023-01-01 PROCEDURE — 99306 1ST NF CARE HIGH MDM 50: CPT | Performed by: FAMILY MEDICINE

## 2023-01-01 PROCEDURE — 250N000009 HC RX 250: Performed by: CLINICAL NURSE SPECIALIST

## 2023-01-01 PROCEDURE — 272N000202 HC AEROBIKA WITH MANOMETER

## 2023-01-01 PROCEDURE — 85027 COMPLETE CBC AUTOMATED: CPT | Performed by: FAMILY MEDICINE

## 2023-01-01 PROCEDURE — G0463 HOSPITAL OUTPT CLINIC VISIT: HCPCS | Performed by: INTERNAL MEDICINE

## 2023-01-01 PROCEDURE — 85025 COMPLETE CBC W/AUTO DIFF WBC: CPT | Performed by: FAMILY MEDICINE

## 2023-01-01 PROCEDURE — 71250 CT THORAX DX C-: CPT | Mod: MG

## 2023-01-01 PROCEDURE — A9552 F18 FDG: HCPCS | Performed by: INTERNAL MEDICINE

## 2023-01-01 PROCEDURE — 82310 ASSAY OF CALCIUM: CPT | Performed by: FAMILY MEDICINE

## 2023-01-01 PROCEDURE — 80048 BASIC METABOLIC PNL TOTAL CA: CPT | Performed by: NURSE PRACTITIONER

## 2023-01-01 PROCEDURE — 93306 TTE W/DOPPLER COMPLETE: CPT | Mod: 26 | Performed by: INTERNAL MEDICINE

## 2023-01-01 PROCEDURE — 272N000710 US BIOPSY BACK SOFT TISSUE SUPERFICIAL

## 2023-01-01 PROCEDURE — 99291 CRITICAL CARE FIRST HOUR: CPT | Mod: 25

## 2023-01-01 PROCEDURE — 86140 C-REACTIVE PROTEIN: CPT | Performed by: EMERGENCY MEDICINE

## 2023-01-01 PROCEDURE — 80053 COMPREHEN METABOLIC PANEL: CPT | Performed by: INTERNAL MEDICINE

## 2023-01-01 PROCEDURE — 93005 ELECTROCARDIOGRAM TRACING: CPT | Performed by: STUDENT IN AN ORGANIZED HEALTH CARE EDUCATION/TRAINING PROGRAM

## 2023-01-01 PROCEDURE — 120N000013 HC R&B IMCU

## 2023-01-01 PROCEDURE — 99232 SBSQ HOSP IP/OBS MODERATE 35: CPT | Performed by: STUDENT IN AN ORGANIZED HEALTH CARE EDUCATION/TRAINING PROGRAM

## 2023-01-01 PROCEDURE — 85018 HEMOGLOBIN: CPT | Performed by: INTERNAL MEDICINE

## 2023-01-01 PROCEDURE — 97165 OT EVAL LOW COMPLEX 30 MIN: CPT | Mod: GO

## 2023-01-01 PROCEDURE — 99496 TRANSJ CARE MGMT HIGH F2F 7D: CPT | Performed by: INTERNAL MEDICINE

## 2023-01-01 PROCEDURE — 73620 X-RAY EXAM OF FOOT: CPT | Mod: RT

## 2023-01-01 PROCEDURE — 88342 IMHCHEM/IMCYTCHM 1ST ANTB: CPT | Mod: 26 | Performed by: PATHOLOGY

## 2023-01-01 PROCEDURE — 5A09357 ASSISTANCE WITH RESPIRATORY VENTILATION, LESS THAN 24 CONSECUTIVE HOURS, CONTINUOUS POSITIVE AIRWAY PRESSURE: ICD-10-PCS | Performed by: FAMILY MEDICINE

## 2023-01-01 PROCEDURE — 74177 CT ABD & PELVIS W/CONTRAST: CPT | Mod: MG

## 2023-01-01 PROCEDURE — 87641 MR-STAPH DNA AMP PROBE: CPT | Performed by: INTERNAL MEDICINE

## 2023-01-01 PROCEDURE — G0378 HOSPITAL OBSERVATION PER HR: HCPCS

## 2023-01-01 PROCEDURE — 84145 PROCALCITONIN (PCT): CPT | Performed by: EMERGENCY MEDICINE

## 2023-01-01 PROCEDURE — 99232 SBSQ HOSP IP/OBS MODERATE 35: CPT | Performed by: CLINICAL NURSE SPECIALIST

## 2023-01-01 PROCEDURE — 999N000127 HC STATISTIC PERIPHERAL IV START W US GUIDANCE

## 2023-01-01 PROCEDURE — 86140 C-REACTIVE PROTEIN: CPT | Performed by: INTERNAL MEDICINE

## 2023-01-01 PROCEDURE — 80202 ASSAY OF VANCOMYCIN: CPT | Performed by: STUDENT IN AN ORGANIZED HEALTH CARE EDUCATION/TRAINING PROGRAM

## 2023-01-01 PROCEDURE — 999N000033 HC STATISTIC CHRONIC PULMONARY DISEASE SPECIALIST

## 2023-01-01 PROCEDURE — 84145 PROCALCITONIN (PCT): CPT | Performed by: INTERNAL MEDICINE

## 2023-01-01 PROCEDURE — 82805 BLOOD GASES W/O2 SATURATION: CPT | Performed by: INTERNAL MEDICINE

## 2023-01-01 PROCEDURE — 99231 SBSQ HOSP IP/OBS SF/LOW 25: CPT | Performed by: PHYSICIAN ASSISTANT

## 2023-01-01 PROCEDURE — 96376 TX/PRO/DX INJ SAME DRUG ADON: CPT | Mod: 59

## 2023-01-01 PROCEDURE — 87635 SARS-COV-2 COVID-19 AMP PRB: CPT | Performed by: EMERGENCY MEDICINE

## 2023-01-01 PROCEDURE — 99305 1ST NF CARE MODERATE MDM 35: CPT | Performed by: FAMILY MEDICINE

## 2023-01-01 PROCEDURE — 85007 BL SMEAR W/DIFF WBC COUNT: CPT

## 2023-01-01 PROCEDURE — 99310 SBSQ NF CARE HIGH MDM 45: CPT | Performed by: NURSE PRACTITIONER

## 2023-01-01 PROCEDURE — 71250 CT THORAX DX C-: CPT | Mod: XS

## 2023-01-01 PROCEDURE — 85027 COMPLETE CBC AUTOMATED: CPT | Performed by: EMERGENCY MEDICINE

## 2023-01-01 PROCEDURE — 93306 TTE W/DOPPLER COMPLETE: CPT

## 2023-01-01 PROCEDURE — 99239 HOSP IP/OBS DSCHRG MGMT >30: CPT | Performed by: INTERNAL MEDICINE

## 2023-01-01 PROCEDURE — 88341 IMHCHEM/IMCYTCHM EA ADD ANTB: CPT | Mod: 26 | Performed by: PATHOLOGY

## 2023-01-01 PROCEDURE — 99231 SBSQ HOSP IP/OBS SF/LOW 25: CPT | Performed by: CLINICAL NURSE SPECIALIST

## 2023-01-01 PROCEDURE — 80048 BASIC METABOLIC PNL TOTAL CA: CPT | Performed by: EMERGENCY MEDICINE

## 2023-01-01 PROCEDURE — 99207 PR MD CERTIFICATION HHA PATIENT: CPT | Performed by: INTERNAL MEDICINE

## 2023-01-01 PROCEDURE — 99214 OFFICE O/P EST MOD 30 MIN: CPT | Performed by: NURSE PRACTITIONER

## 2023-01-01 PROCEDURE — 343N000001 HC RX 343: Performed by: FAMILY MEDICINE

## 2023-01-01 PROCEDURE — 97129 THER IVNTJ 1ST 15 MIN: CPT | Mod: GO

## 2023-01-01 PROCEDURE — 93000 ELECTROCARDIOGRAM COMPLETE: CPT | Performed by: INTERNAL MEDICINE

## 2023-01-01 PROCEDURE — 99239 HOSP IP/OBS DSCHRG MGMT >30: CPT | Performed by: STUDENT IN AN ORGANIZED HEALTH CARE EDUCATION/TRAINING PROGRAM

## 2023-01-01 PROCEDURE — 999N000065 XR ABDOMEN PORT 1 VIEW

## 2023-01-01 PROCEDURE — 76642 ULTRASOUND BREAST LIMITED: CPT | Mod: LT

## 2023-01-01 PROCEDURE — 99239 HOSP IP/OBS DSCHRG MGMT >30: CPT | Performed by: FAMILY MEDICINE

## 2023-01-01 PROCEDURE — 88305 TISSUE EXAM BY PATHOLOGIST: CPT | Mod: 26 | Performed by: PATHOLOGY

## 2023-01-01 PROCEDURE — G1010 CDSM STANSON: HCPCS | Mod: PI

## 2023-01-01 RX ORDER — MIRABEGRON 25 MG/1
25 TABLET, FILM COATED, EXTENDED RELEASE ORAL DAILY
Status: DISCONTINUED | OUTPATIENT
Start: 2023-01-01 | End: 2023-01-01 | Stop reason: HOSPADM

## 2023-01-01 RX ORDER — HYDROMORPHONE HCL IN WATER/PF 6 MG/30 ML
0.2 PATIENT CONTROLLED ANALGESIA SYRINGE INTRAVENOUS
Status: DISCONTINUED | OUTPATIENT
Start: 2023-01-01 | End: 2023-01-01

## 2023-01-01 RX ORDER — TAMOXIFEN CITRATE 10 MG/1
20 TABLET ORAL DAILY
Status: DISCONTINUED | OUTPATIENT
Start: 2023-01-01 | End: 2023-01-01 | Stop reason: HOSPADM

## 2023-01-01 RX ORDER — METOPROLOL SUCCINATE 25 MG/1
75 TABLET, EXTENDED RELEASE ORAL AT BEDTIME
Status: DISCONTINUED | OUTPATIENT
Start: 2023-01-01 | End: 2023-01-01 | Stop reason: HOSPADM

## 2023-01-01 RX ORDER — PREDNISONE 20 MG/1
20 TABLET ORAL DAILY
Qty: 5 TABLET | Refills: 0 | Status: SHIPPED | OUTPATIENT
Start: 2023-01-01 | End: 2023-01-01

## 2023-01-01 RX ORDER — POTASSIUM CHLORIDE 1500 MG/1
20 TABLET, EXTENDED RELEASE ORAL ONCE
Status: COMPLETED | OUTPATIENT
Start: 2023-01-01 | End: 2023-01-01

## 2023-01-01 RX ORDER — SOLIFENACIN SUCCINATE 5 MG/1
5 TABLET, FILM COATED ORAL DAILY
Qty: 90 TABLET | Refills: 1 | Status: CANCELLED | OUTPATIENT
Start: 2023-01-01

## 2023-01-01 RX ORDER — ACETAMINOPHEN 500 MG
1000 TABLET ORAL DAILY PRN
COMMUNITY

## 2023-01-01 RX ORDER — FUROSEMIDE 10 MG/ML
20 INJECTION INTRAMUSCULAR; INTRAVENOUS EVERY 8 HOURS
Status: DISCONTINUED | OUTPATIENT
Start: 2023-01-01 | End: 2023-01-01

## 2023-01-01 RX ORDER — BUMETANIDE 0.25 MG/ML
1 INJECTION INTRAMUSCULAR; INTRAVENOUS
Status: DISCONTINUED | OUTPATIENT
Start: 2023-01-01 | End: 2023-01-01

## 2023-01-01 RX ORDER — LORAZEPAM 2 MG/ML
.25-.5 CONCENTRATE ORAL EVERY 4 HOURS PRN
Status: DISCONTINUED | OUTPATIENT
Start: 2023-01-01 | End: 2023-01-01

## 2023-01-01 RX ORDER — IPRATROPIUM BROMIDE AND ALBUTEROL SULFATE 2.5; .5 MG/3ML; MG/3ML
3 SOLUTION RESPIRATORY (INHALATION) ONCE
Status: COMPLETED | OUTPATIENT
Start: 2023-01-01 | End: 2023-01-01

## 2023-01-01 RX ORDER — LIDOCAINE 4 G/G
1-3 PATCH TOPICAL
Status: DISCONTINUED | OUTPATIENT
Start: 2023-01-01 | End: 2023-01-01 | Stop reason: HOSPADM

## 2023-01-01 RX ORDER — PREDNISONE 10 MG/1
10 TABLET ORAL DAILY
Status: DISCONTINUED | OUTPATIENT
Start: 2023-01-01 | End: 2023-01-01 | Stop reason: HOSPADM

## 2023-01-01 RX ORDER — POTASSIUM CHLORIDE 1500 MG/1
40 TABLET, EXTENDED RELEASE ORAL ONCE
Status: COMPLETED | OUTPATIENT
Start: 2023-01-01 | End: 2023-01-01

## 2023-01-01 RX ORDER — DIPHENHYDRAMINE HCL 25 MG
25 CAPSULE ORAL EVERY 4 HOURS PRN
Status: DISCONTINUED | OUTPATIENT
Start: 2023-01-01 | End: 2023-01-01

## 2023-01-01 RX ORDER — SPIRONOLACTONE 25 MG/1
12.5 TABLET ORAL AT BEDTIME
Qty: 15 TABLET | Refills: 1 | Status: ON HOLD | OUTPATIENT
Start: 2023-01-01 | End: 2023-01-01

## 2023-01-01 RX ORDER — MAGNESIUM SULFATE HEPTAHYDRATE 40 MG/ML
2 INJECTION, SOLUTION INTRAVENOUS ONCE
Status: COMPLETED | OUTPATIENT
Start: 2023-01-01 | End: 2023-01-01

## 2023-01-01 RX ORDER — OXYCODONE HYDROCHLORIDE 5 MG/1
5 TABLET ORAL EVERY 4 HOURS PRN
Status: DISCONTINUED | OUTPATIENT
Start: 2023-01-01 | End: 2023-01-01

## 2023-01-01 RX ORDER — DOXYCYCLINE 100 MG/1
100 CAPSULE ORAL EVERY 12 HOURS SCHEDULED
Status: DISCONTINUED | OUTPATIENT
Start: 2023-01-01 | End: 2023-01-01 | Stop reason: HOSPADM

## 2023-01-01 RX ORDER — LORAZEPAM 2 MG/ML
.5-1 CONCENTRATE ORAL
Status: DISCONTINUED | OUTPATIENT
Start: 2023-01-01 | End: 2023-01-01

## 2023-01-01 RX ORDER — DEXAMETHASONE 1 MG
1 TABLET ORAL 2 TIMES DAILY WITH MEALS
Qty: 30 TABLET | Refills: 1 | Status: SHIPPED | OUTPATIENT
Start: 2023-01-01

## 2023-01-01 RX ORDER — LORAZEPAM 2 MG/ML
.25-.5 INJECTION INTRAMUSCULAR
Status: DISCONTINUED | OUTPATIENT
Start: 2023-01-01 | End: 2023-01-01

## 2023-01-01 RX ORDER — TRAMADOL HYDROCHLORIDE 50 MG/1
50 TABLET ORAL DAILY PRN
COMMUNITY

## 2023-01-01 RX ORDER — DIPHENHYDRAMINE HYDROCHLORIDE 50 MG/ML
25 INJECTION INTRAMUSCULAR; INTRAVENOUS EVERY 4 HOURS PRN
Status: DISCONTINUED | OUTPATIENT
Start: 2023-01-01 | End: 2023-11-05 | Stop reason: HOSPADM

## 2023-01-01 RX ORDER — PIPERACILLIN SODIUM, TAZOBACTAM SODIUM 3; .375 G/15ML; G/15ML
3.38 INJECTION, POWDER, LYOPHILIZED, FOR SOLUTION INTRAVENOUS ONCE
Status: COMPLETED | OUTPATIENT
Start: 2023-01-01 | End: 2023-01-01

## 2023-01-01 RX ORDER — MIRABEGRON 25 MG/1
25 TABLET, FILM COATED, EXTENDED RELEASE ORAL DAILY
Qty: 90 TABLET | Refills: 2 | Status: SHIPPED | OUTPATIENT
Start: 2023-01-01

## 2023-01-01 RX ORDER — FUROSEMIDE 40 MG
40 TABLET ORAL 2 TIMES DAILY
OUTPATIENT
Start: 2023-01-01

## 2023-01-01 RX ORDER — SPIRONOLACTONE 25 MG
12.5 TABLET ORAL AT BEDTIME
Status: DISCONTINUED | OUTPATIENT
Start: 2023-01-01 | End: 2023-01-01 | Stop reason: HOSPADM

## 2023-01-01 RX ORDER — ACETAMINOPHEN 500 MG
1000 TABLET ORAL EVERY 8 HOURS PRN
Status: DISCONTINUED | OUTPATIENT
Start: 2023-01-01 | End: 2023-01-01 | Stop reason: HOSPADM

## 2023-01-01 RX ORDER — TOLTERODINE 2 MG/1
2 CAPSULE, EXTENDED RELEASE ORAL DAILY
Status: DISCONTINUED | OUTPATIENT
Start: 2023-01-01 | End: 2023-01-01 | Stop reason: HOSPADM

## 2023-01-01 RX ORDER — NALOXONE HYDROCHLORIDE 0.4 MG/ML
0.2 INJECTION, SOLUTION INTRAMUSCULAR; INTRAVENOUS; SUBCUTANEOUS
Status: DISCONTINUED | OUTPATIENT
Start: 2023-01-01 | End: 2023-01-01 | Stop reason: HOSPADM

## 2023-01-01 RX ORDER — LORAZEPAM 2 MG/ML
0.5 INJECTION INTRAMUSCULAR EVERY 4 HOURS PRN
Status: DISCONTINUED | OUTPATIENT
Start: 2023-01-01 | End: 2023-01-01

## 2023-01-01 RX ORDER — HYDROXYZINE HYDROCHLORIDE 25 MG/1
25-50 TABLET, FILM COATED ORAL EVERY 6 HOURS PRN
Start: 2023-01-01

## 2023-01-01 RX ORDER — ALBUTEROL SULFATE 90 UG/1
2 AEROSOL, METERED RESPIRATORY (INHALATION) EVERY 6 HOURS PRN
Status: DISCONTINUED | OUTPATIENT
Start: 2023-01-01 | End: 2023-01-01 | Stop reason: HOSPADM

## 2023-01-01 RX ORDER — OLANZAPINE 5 MG/1
5 TABLET ORAL AT BEDTIME
Status: DISCONTINUED | OUTPATIENT
Start: 2023-01-01 | End: 2023-01-01 | Stop reason: HOSPADM

## 2023-01-01 RX ORDER — HYDROXYZINE HYDROCHLORIDE 25 MG/1
25 TABLET, FILM COATED ORAL EVERY 6 HOURS PRN
Qty: 20 TABLET | Refills: 0 | Status: SHIPPED | OUTPATIENT
Start: 2023-01-01 | End: 2023-01-01

## 2023-01-01 RX ORDER — PROCHLORPERAZINE MALEATE 10 MG
10 TABLET ORAL EVERY 6 HOURS PRN
Status: DISCONTINUED | OUTPATIENT
Start: 2023-01-01 | End: 2023-01-01 | Stop reason: HOSPADM

## 2023-01-01 RX ORDER — MORPHINE SULFATE 10 MG/5ML
5 SOLUTION ORAL
Status: DISCONTINUED | OUTPATIENT
Start: 2023-01-01 | End: 2023-01-01

## 2023-01-01 RX ORDER — PREDNISONE 20 MG/1
20 TABLET ORAL DAILY
Status: DISCONTINUED | OUTPATIENT
Start: 2023-01-01 | End: 2023-01-01

## 2023-01-01 RX ORDER — HYDROMORPHONE HYDROCHLORIDE 1 MG/ML
0.5 INJECTION, SOLUTION INTRAMUSCULAR; INTRAVENOUS; SUBCUTANEOUS
Status: DISCONTINUED | OUTPATIENT
Start: 2023-01-01 | End: 2023-01-01

## 2023-01-01 RX ORDER — SIMVASTATIN 40 MG
40 TABLET ORAL AT BEDTIME
Qty: 90 TABLET | Refills: 3 | Status: SHIPPED | OUTPATIENT
Start: 2023-01-01

## 2023-01-01 RX ORDER — POLYETHYLENE GLYCOL 3350 17 G/17G
17 POWDER, FOR SOLUTION ORAL DAILY PRN
Status: DISCONTINUED | OUTPATIENT
Start: 2023-01-01 | End: 2023-01-01 | Stop reason: HOSPADM

## 2023-01-01 RX ORDER — FLUTICASONE PROPIONATE 50 MCG
1 SPRAY, SUSPENSION (ML) NASAL DAILY PRN
Status: DISCONTINUED | OUTPATIENT
Start: 2023-01-01 | End: 2023-01-01 | Stop reason: HOSPADM

## 2023-01-01 RX ORDER — LORAZEPAM 0.5 MG/1
0.5 TABLET ORAL ONCE
Status: COMPLETED | OUTPATIENT
Start: 2023-01-01 | End: 2023-01-01

## 2023-01-01 RX ORDER — FAMOTIDINE 20 MG/1
20 TABLET, FILM COATED ORAL
Status: DISCONTINUED | OUTPATIENT
Start: 2023-01-01 | End: 2023-01-01 | Stop reason: HOSPADM

## 2023-01-01 RX ORDER — HYDRALAZINE HYDROCHLORIDE 20 MG/ML
10 INJECTION INTRAMUSCULAR; INTRAVENOUS EVERY 6 HOURS PRN
Status: DISCONTINUED | OUTPATIENT
Start: 2023-01-01 | End: 2023-01-01 | Stop reason: HOSPADM

## 2023-01-01 RX ORDER — LORAZEPAM 2 MG/ML
.25-.5 INJECTION INTRAMUSCULAR EVERY 4 HOURS PRN
Status: DISCONTINUED | OUTPATIENT
Start: 2023-01-01 | End: 2023-01-01

## 2023-01-01 RX ORDER — TRAMADOL HYDROCHLORIDE 50 MG/1
50 TABLET ORAL 2 TIMES DAILY
Qty: 60 TABLET | Refills: 0 | Status: SHIPPED | OUTPATIENT
Start: 2023-01-01 | End: 2023-01-01

## 2023-01-01 RX ORDER — PIPERACILLIN SODIUM, TAZOBACTAM SODIUM 3; .375 G/15ML; G/15ML
3.38 INJECTION, POWDER, LYOPHILIZED, FOR SOLUTION INTRAVENOUS EVERY 8 HOURS
Status: DISCONTINUED | OUTPATIENT
Start: 2023-01-01 | End: 2023-01-01

## 2023-01-01 RX ORDER — DOXYCYCLINE 100 MG/10ML
100 INJECTION, POWDER, LYOPHILIZED, FOR SOLUTION INTRAVENOUS EVERY 12 HOURS
Status: DISCONTINUED | OUTPATIENT
Start: 2023-01-01 | End: 2023-01-01 | Stop reason: HOSPADM

## 2023-01-01 RX ORDER — CHLORHEXIDINE GLUCONATE ORAL RINSE 1.2 MG/ML
15 SOLUTION DENTAL
Status: DISCONTINUED | OUTPATIENT
Start: 2023-01-01 | End: 2023-01-01 | Stop reason: HOSPADM

## 2023-01-01 RX ORDER — FUROSEMIDE 40 MG
40 TABLET ORAL
Qty: 60 TABLET | Refills: 0 | Status: ON HOLD | OUTPATIENT
Start: 2023-01-01 | End: 2023-01-01

## 2023-01-01 RX ORDER — BENZONATATE 100 MG/1
100 CAPSULE ORAL 3 TIMES DAILY PRN
Status: DISCONTINUED | OUTPATIENT
Start: 2023-01-01 | End: 2023-01-01 | Stop reason: HOSPADM

## 2023-01-01 RX ORDER — CETIRIZINE HYDROCHLORIDE 10 MG/1
10 TABLET ORAL DAILY PRN
Status: DISCONTINUED | OUTPATIENT
Start: 2023-01-01 | End: 2023-01-01 | Stop reason: HOSPADM

## 2023-01-01 RX ORDER — VANCOMYCIN HYDROCHLORIDE 500 MG/10ML
500 INJECTION, POWDER, LYOPHILIZED, FOR SOLUTION INTRAVENOUS
Status: DISCONTINUED | OUTPATIENT
Start: 2023-01-01 | End: 2023-01-01

## 2023-01-01 RX ORDER — LIDOCAINE HYDROCHLORIDE 20 MG/ML
JELLY TOPICAL 4 TIMES DAILY PRN
Status: DISCONTINUED | OUTPATIENT
Start: 2023-01-01 | End: 2023-11-05 | Stop reason: HOSPADM

## 2023-01-01 RX ORDER — HYDROXYZINE HYDROCHLORIDE 25 MG/1
50 TABLET, FILM COATED ORAL 3 TIMES DAILY PRN
Status: DISCONTINUED | OUTPATIENT
Start: 2023-01-01 | End: 2023-01-01 | Stop reason: HOSPADM

## 2023-01-01 RX ORDER — SIMVASTATIN 20 MG
40 TABLET ORAL AT BEDTIME
Status: DISCONTINUED | OUTPATIENT
Start: 2023-01-01 | End: 2023-01-01 | Stop reason: HOSPADM

## 2023-01-01 RX ORDER — FUROSEMIDE 10 MG/ML
80 INJECTION INTRAMUSCULAR; INTRAVENOUS EVERY 8 HOURS
Status: DISCONTINUED | OUTPATIENT
Start: 2023-01-01 | End: 2023-01-01

## 2023-01-01 RX ORDER — CALCIUM CARBONATE 500 MG/1
500 TABLET, CHEWABLE ORAL DAILY PRN
Status: COMPLETED | OUTPATIENT
Start: 2023-01-01 | End: 2023-01-01

## 2023-01-01 RX ORDER — DOXYCYCLINE 100 MG/10ML
100 INJECTION, POWDER, LYOPHILIZED, FOR SOLUTION INTRAVENOUS ONCE
Status: COMPLETED | OUTPATIENT
Start: 2023-01-01 | End: 2023-01-01

## 2023-01-01 RX ORDER — ONDANSETRON 2 MG/ML
4 INJECTION INTRAMUSCULAR; INTRAVENOUS EVERY 6 HOURS PRN
Status: DISCONTINUED | OUTPATIENT
Start: 2023-01-01 | End: 2023-01-01 | Stop reason: HOSPADM

## 2023-01-01 RX ORDER — ONDANSETRON 2 MG/ML
4 INJECTION INTRAMUSCULAR; INTRAVENOUS EVERY 6 HOURS PRN
Status: DISCONTINUED | OUTPATIENT
Start: 2023-01-01 | End: 2023-11-05 | Stop reason: HOSPADM

## 2023-01-01 RX ORDER — SOLIFENACIN SUCCINATE 5 MG/1
5 TABLET, FILM COATED ORAL DAILY
Qty: 90 TABLET | Refills: 3 | OUTPATIENT
Start: 2023-01-01

## 2023-01-01 RX ORDER — AZELASTINE HYDROCHLORIDE 137 UG/1
1 SPRAY, METERED NASAL 2 TIMES DAILY PRN
COMMUNITY

## 2023-01-01 RX ORDER — ACETYLCYSTEINE 100 MG/ML
4 SOLUTION ORAL; RESPIRATORY (INHALATION) EVERY 4 HOURS
COMMUNITY
Start: 2023-01-01 | End: 2023-01-01

## 2023-01-01 RX ORDER — BUMETANIDE 1 MG/1
1 TABLET ORAL
Status: DISCONTINUED | OUTPATIENT
Start: 2023-01-01 | End: 2023-01-01 | Stop reason: HOSPADM

## 2023-01-01 RX ORDER — MORPHINE SULFATE 20 MG/ML
5 SOLUTION ORAL
Status: DISCONTINUED | OUTPATIENT
Start: 2023-01-01 | End: 2023-01-01

## 2023-01-01 RX ORDER — IPRATROPIUM BROMIDE AND ALBUTEROL SULFATE 2.5; .5 MG/3ML; MG/3ML
3 SOLUTION RESPIRATORY (INHALATION)
Status: DISCONTINUED | OUTPATIENT
Start: 2023-01-01 | End: 2023-01-01

## 2023-01-01 RX ORDER — GABAPENTIN 600 MG/1
600 TABLET ORAL AT BEDTIME
Status: DISCONTINUED | OUTPATIENT
Start: 2023-01-01 | End: 2023-01-01 | Stop reason: HOSPADM

## 2023-01-01 RX ORDER — BUPRENORPHINE 15 UG/H
1 PATCH TRANSDERMAL
Qty: 4 PATCH | Refills: 3 | Status: SHIPPED | OUTPATIENT
Start: 2023-01-01 | End: 2023-01-01 | Stop reason: DRUGHIGH

## 2023-01-01 RX ORDER — ZINC OXIDE
OINTMENT (GRAM) TOPICAL PRN
Qty: 113 G | Refills: 1 | Status: SHIPPED | OUTPATIENT
Start: 2023-01-01

## 2023-01-01 RX ORDER — FAMOTIDINE 20 MG/1
20 TABLET, FILM COATED ORAL EVERY OTHER DAY
Status: DISCONTINUED | OUTPATIENT
Start: 2023-01-01 | End: 2023-01-01 | Stop reason: HOSPADM

## 2023-01-01 RX ORDER — ACETYLCYSTEINE 200 MG/ML
4 SOLUTION ORAL; RESPIRATORY (INHALATION)
Status: DISCONTINUED | OUTPATIENT
Start: 2023-01-01 | End: 2023-01-01

## 2023-01-01 RX ORDER — FENTANYL CITRATE 50 UG/ML
25 INJECTION, SOLUTION INTRAMUSCULAR; INTRAVENOUS ONCE
Status: COMPLETED | OUTPATIENT
Start: 2023-01-01 | End: 2023-01-01

## 2023-01-01 RX ORDER — SPIRONOLACTONE 25 MG/1
25 TABLET ORAL DAILY
Status: DISCONTINUED | OUTPATIENT
Start: 2023-01-01 | End: 2023-01-01 | Stop reason: HOSPADM

## 2023-01-01 RX ORDER — BUMETANIDE 0.25 MG/ML
1 INJECTION INTRAMUSCULAR; INTRAVENOUS ONCE
Status: COMPLETED | OUTPATIENT
Start: 2023-01-01 | End: 2023-01-01

## 2023-01-01 RX ORDER — FUROSEMIDE 80 MG
80 TABLET ORAL 2 TIMES DAILY
Qty: 28 TABLET | Refills: 0 | Status: SHIPPED | OUTPATIENT
Start: 2023-01-01 | End: 2023-01-01

## 2023-01-01 RX ORDER — NYSTATIN 100000 U/G
CREAM TOPICAL 3 TIMES DAILY PRN
COMMUNITY

## 2023-01-01 RX ORDER — MORPHINE SULFATE 10 MG/ML
10 INJECTION, SOLUTION INTRAMUSCULAR; INTRAVENOUS
Status: DISCONTINUED | OUTPATIENT
Start: 2023-01-01 | End: 2023-11-05 | Stop reason: HOSPADM

## 2023-01-01 RX ORDER — FLUTICASONE FUROATE AND VILANTEROL 200; 25 UG/1; UG/1
1 POWDER RESPIRATORY (INHALATION) DAILY
Status: DISCONTINUED | OUTPATIENT
Start: 2023-01-01 | End: 2023-01-01

## 2023-01-01 RX ORDER — OXYCODONE HYDROCHLORIDE 5 MG/1
5 TABLET ORAL ONCE
Status: COMPLETED | OUTPATIENT
Start: 2023-01-01 | End: 2023-01-01

## 2023-01-01 RX ORDER — IOPAMIDOL 755 MG/ML
75 INJECTION, SOLUTION INTRAVASCULAR ONCE
Status: COMPLETED | OUTPATIENT
Start: 2023-01-01 | End: 2023-01-01

## 2023-01-01 RX ORDER — MORPHINE SULFATE 10 MG/5ML
10 SOLUTION ORAL
Status: DISCONTINUED | OUTPATIENT
Start: 2023-01-01 | End: 2023-01-01

## 2023-01-01 RX ORDER — IPRATROPIUM BROMIDE AND ALBUTEROL SULFATE 2.5; .5 MG/3ML; MG/3ML
1 SOLUTION RESPIRATORY (INHALATION) EVERY 6 HOURS PRN
Status: DISCONTINUED | OUTPATIENT
Start: 2023-01-01 | End: 2023-01-01

## 2023-01-01 RX ORDER — GABAPENTIN 300 MG/1
300 CAPSULE ORAL AT BEDTIME
Qty: 30 CAPSULE | Refills: 0 | Status: SHIPPED | OUTPATIENT
Start: 2023-01-01 | End: 2023-01-01

## 2023-01-01 RX ORDER — NALOXONE HYDROCHLORIDE 0.4 MG/ML
0.2 INJECTION, SOLUTION INTRAMUSCULAR; INTRAVENOUS; SUBCUTANEOUS
Status: DISCONTINUED | OUTPATIENT
Start: 2023-01-01 | End: 2023-11-05 | Stop reason: HOSPADM

## 2023-01-01 RX ORDER — NALOXONE HYDROCHLORIDE 0.4 MG/ML
0.4 INJECTION, SOLUTION INTRAMUSCULAR; INTRAVENOUS; SUBCUTANEOUS
Status: DISCONTINUED | OUTPATIENT
Start: 2023-01-01 | End: 2023-01-01 | Stop reason: HOSPADM

## 2023-01-01 RX ORDER — FUROSEMIDE 40 MG
TABLET ORAL
Qty: 240 TABLET | Refills: 3 | Status: ON HOLD | OUTPATIENT
Start: 2023-01-01 | End: 2023-01-01

## 2023-01-01 RX ORDER — AZITHROMYCIN 250 MG/1
TABLET, FILM COATED ORAL
Status: ON HOLD | COMMUNITY
Start: 2023-01-01 | End: 2023-01-01

## 2023-01-01 RX ORDER — TRAMADOL HYDROCHLORIDE 50 MG/1
50 TABLET ORAL 3 TIMES DAILY
Qty: 60 TABLET | Refills: 0 | Status: SHIPPED | OUTPATIENT
Start: 2023-01-01

## 2023-01-01 RX ORDER — ENOXAPARIN SODIUM 100 MG/ML
40 INJECTION SUBCUTANEOUS EVERY 24 HOURS
Status: DISCONTINUED | OUTPATIENT
Start: 2023-01-01 | End: 2023-01-01

## 2023-01-01 RX ORDER — ALBUTEROL SULFATE 0.83 MG/ML
5 SOLUTION RESPIRATORY (INHALATION) ONCE
Status: COMPLETED | OUTPATIENT
Start: 2023-01-01 | End: 2023-01-01

## 2023-01-01 RX ORDER — AMOXICILLIN AND CLAVULANATE POTASSIUM 500; 125 MG/1; MG/1
1 TABLET, FILM COATED ORAL EVERY 12 HOURS
Qty: 1 TABLET | Refills: 0 | Status: SHIPPED | OUTPATIENT
Start: 2023-01-01 | End: 2023-01-01

## 2023-01-01 RX ORDER — MORPHINE SULFATE 10 MG/ML
10 INJECTION, SOLUTION INTRAMUSCULAR; INTRAVENOUS
Status: DISCONTINUED | OUTPATIENT
Start: 2023-01-01 | End: 2023-01-01

## 2023-01-01 RX ORDER — NYSTATIN 100000 U/G
CREAM TOPICAL 3 TIMES DAILY PRN
Status: DISCONTINUED | OUTPATIENT
Start: 2023-01-01 | End: 2023-01-01 | Stop reason: HOSPADM

## 2023-01-01 RX ORDER — METOPROLOL SUCCINATE 50 MG/1
50 TABLET, EXTENDED RELEASE ORAL AT BEDTIME
Status: DISCONTINUED | OUTPATIENT
Start: 2023-01-01 | End: 2023-01-01 | Stop reason: HOSPADM

## 2023-01-01 RX ORDER — LORAZEPAM 2 MG/ML
1 INJECTION INTRAMUSCULAR ONCE
Status: COMPLETED | OUTPATIENT
Start: 2023-01-01 | End: 2023-01-01

## 2023-01-01 RX ORDER — ACETAMINOPHEN 650 MG/1
650 SUPPOSITORY RECTAL EVERY 6 HOURS PRN
Status: DISCONTINUED | OUTPATIENT
Start: 2023-01-01 | End: 2023-01-01

## 2023-01-01 RX ORDER — IPRATROPIUM BROMIDE AND ALBUTEROL SULFATE 2.5; .5 MG/3ML; MG/3ML
3 SOLUTION RESPIRATORY (INHALATION) EVERY 4 HOURS PRN
Status: DISCONTINUED | OUTPATIENT
Start: 2023-01-01 | End: 2023-01-01 | Stop reason: HOSPADM

## 2023-01-01 RX ORDER — PREDNISONE 20 MG/1
40 TABLET ORAL DAILY
Status: DISCONTINUED | OUTPATIENT
Start: 2023-01-01 | End: 2023-01-01 | Stop reason: HOSPADM

## 2023-01-01 RX ORDER — HYDROMORPHONE HYDROCHLORIDE 1 MG/ML
0.25 INJECTION, SOLUTION INTRAMUSCULAR; INTRAVENOUS; SUBCUTANEOUS EVERY 30 MIN PRN
Status: DISCONTINUED | OUTPATIENT
Start: 2023-01-01 | End: 2023-01-01

## 2023-01-01 RX ORDER — TRAMADOL HYDROCHLORIDE 50 MG/1
50 TABLET ORAL 2 TIMES DAILY
Status: DISCONTINUED | OUTPATIENT
Start: 2023-01-01 | End: 2023-01-01 | Stop reason: HOSPADM

## 2023-01-01 RX ORDER — ACETAMINOPHEN 325 MG/1
650 TABLET ORAL EVERY 4 HOURS PRN
Status: DISCONTINUED | OUTPATIENT
Start: 2023-01-01 | End: 2023-01-01 | Stop reason: HOSPADM

## 2023-01-01 RX ORDER — LORAZEPAM 2 MG/ML
.5-1 INJECTION INTRAMUSCULAR EVERY 12 HOURS PRN
Status: DISCONTINUED | OUTPATIENT
Start: 2023-01-01 | End: 2023-01-01

## 2023-01-01 RX ORDER — LORAZEPAM 2 MG/ML
.25-.5 CONCENTRATE ORAL
Status: DISCONTINUED | OUTPATIENT
Start: 2023-01-01 | End: 2023-01-01

## 2023-01-01 RX ORDER — GADOBUTROL 604.72 MG/ML
5 INJECTION INTRAVENOUS ONCE
Status: COMPLETED | OUTPATIENT
Start: 2023-01-01 | End: 2023-01-01

## 2023-01-01 RX ORDER — FAMOTIDINE 20 MG/1
20 TABLET, FILM COATED ORAL AT BEDTIME
Qty: 90 TABLET | Refills: 0 | Status: ON HOLD | OUTPATIENT
Start: 2023-01-01 | End: 2023-01-01

## 2023-01-01 RX ORDER — LORAZEPAM 2 MG/ML
0.2 INJECTION INTRAMUSCULAR ONCE
Status: COMPLETED | OUTPATIENT
Start: 2023-01-01 | End: 2023-01-01

## 2023-01-01 RX ORDER — GABAPENTIN 600 MG/1
600 TABLET ORAL AT BEDTIME
Status: DISCONTINUED | OUTPATIENT
Start: 2023-01-01 | End: 2023-01-01

## 2023-01-01 RX ORDER — METOPROLOL SUCCINATE 50 MG/1
75 TABLET, EXTENDED RELEASE ORAL AT BEDTIME
Qty: 135 TABLET | Refills: 1 | Status: ON HOLD | OUTPATIENT
Start: 2023-01-01 | End: 2023-01-01

## 2023-01-01 RX ORDER — OXYCODONE HYDROCHLORIDE 5 MG/1
10 TABLET ORAL EVERY 6 HOURS PRN
Status: DISCONTINUED | OUTPATIENT
Start: 2023-01-01 | End: 2023-01-01

## 2023-01-01 RX ORDER — ACETYLCYSTEINE 100 MG/ML
4 SOLUTION ORAL; RESPIRATORY (INHALATION) EVERY 4 HOURS PRN
Status: DISCONTINUED | OUTPATIENT
Start: 2023-01-01 | End: 2023-11-05 | Stop reason: HOSPADM

## 2023-01-01 RX ORDER — EMOLLIENT BASE
1 CREAM (GRAM) TOPICAL
Status: DISCONTINUED | OUTPATIENT
Start: 2023-01-01 | End: 2023-01-01 | Stop reason: HOSPADM

## 2023-01-01 RX ORDER — NALOXONE HYDROCHLORIDE 0.4 MG/ML
0.4 INJECTION, SOLUTION INTRAMUSCULAR; INTRAVENOUS; SUBCUTANEOUS
Status: DISCONTINUED | OUTPATIENT
Start: 2023-01-01 | End: 2023-11-05 | Stop reason: HOSPADM

## 2023-01-01 RX ORDER — SPIRONOLACTONE 25 MG
12.5 TABLET ORAL DAILY
Status: DISCONTINUED | OUTPATIENT
Start: 2023-01-01 | End: 2023-01-01 | Stop reason: HOSPADM

## 2023-01-01 RX ORDER — KETOROLAC TROMETHAMINE 15 MG/ML
15 INJECTION, SOLUTION INTRAMUSCULAR; INTRAVENOUS EVERY 6 HOURS
Status: DISCONTINUED | OUTPATIENT
Start: 2023-01-01 | End: 2023-01-01

## 2023-01-01 RX ORDER — GADOBUTROL 604.72 MG/ML
4.5 INJECTION INTRAVENOUS ONCE
Status: COMPLETED | OUTPATIENT
Start: 2023-01-01 | End: 2023-01-01

## 2023-01-01 RX ORDER — HYDROXYZINE HYDROCHLORIDE 25 MG/1
50 TABLET, FILM COATED ORAL 3 TIMES DAILY PRN
Status: DISCONTINUED | OUTPATIENT
Start: 2023-01-01 | End: 2023-01-01

## 2023-01-01 RX ORDER — IOPAMIDOL 755 MG/ML
53 INJECTION, SOLUTION INTRAVASCULAR ONCE
Status: COMPLETED | OUTPATIENT
Start: 2023-01-01 | End: 2023-01-01

## 2023-01-01 RX ORDER — SIMVASTATIN 40 MG
40 TABLET ORAL AT BEDTIME
Status: DISCONTINUED | OUTPATIENT
Start: 2023-01-01 | End: 2023-01-01 | Stop reason: HOSPADM

## 2023-01-01 RX ORDER — ASPIRIN 325 MG
325 TABLET ORAL DAILY
Status: DISCONTINUED | OUTPATIENT
Start: 2023-01-01 | End: 2023-01-01

## 2023-01-01 RX ORDER — MORPHINE SULFATE 20 MG/ML
10 SOLUTION ORAL
Status: DISCONTINUED | OUTPATIENT
Start: 2023-01-01 | End: 2023-01-01

## 2023-01-01 RX ORDER — ACETAMINOPHEN 500 MG
1000 TABLET ORAL EVERY 8 HOURS PRN
Status: DISCONTINUED | OUTPATIENT
Start: 2023-01-01 | End: 2023-01-01

## 2023-01-01 RX ORDER — CETIRIZINE HYDROCHLORIDE 5 MG/1
5 TABLET ORAL DAILY PRN
Status: DISCONTINUED | OUTPATIENT
Start: 2023-01-01 | End: 2023-01-01 | Stop reason: HOSPADM

## 2023-01-01 RX ORDER — SPIRONOLACTONE 25 MG
12.5 TABLET ORAL DAILY
Status: DISCONTINUED | OUTPATIENT
Start: 2023-01-01 | End: 2023-01-01

## 2023-01-01 RX ORDER — METOPROLOL TARTRATE 1 MG/ML
5 INJECTION, SOLUTION INTRAVENOUS EVERY 5 MIN PRN
Status: DISCONTINUED | OUTPATIENT
Start: 2023-01-01 | End: 2023-01-01

## 2023-01-01 RX ORDER — POTASSIUM CHLORIDE 750 MG/1
10 TABLET, EXTENDED RELEASE ORAL DAILY
Status: DISCONTINUED | OUTPATIENT
Start: 2023-01-01 | End: 2023-01-01 | Stop reason: HOSPADM

## 2023-01-01 RX ORDER — LIDOCAINE 40 MG/G
CREAM TOPICAL
Status: DISCONTINUED | OUTPATIENT
Start: 2023-01-01 | End: 2023-01-01 | Stop reason: HOSPADM

## 2023-01-01 RX ORDER — KETOROLAC TROMETHAMINE 15 MG/ML
15 INJECTION, SOLUTION INTRAMUSCULAR; INTRAVENOUS EVERY 6 HOURS PRN
Status: DISCONTINUED | OUTPATIENT
Start: 2023-01-01 | End: 2023-01-01

## 2023-01-01 RX ORDER — BUDESONIDE, GLYCOPYRROLATE, AND FORMOTEROL FUMARATE 160; 9; 4.8 UG/1; UG/1; UG/1
2 AEROSOL, METERED RESPIRATORY (INHALATION) 2 TIMES DAILY
Status: ON HOLD | COMMUNITY
Start: 2023-01-01 | End: 2023-01-01

## 2023-01-01 RX ORDER — NITROGLYCERIN 0.4 MG/1
TABLET SUBLINGUAL
Status: COMPLETED
Start: 2023-01-01 | End: 2023-01-01

## 2023-01-01 RX ORDER — ACETYLCYSTEINE 100 MG/ML
4 SOLUTION ORAL; RESPIRATORY (INHALATION) EVERY 4 HOURS PRN
COMMUNITY

## 2023-01-01 RX ORDER — ESCITALOPRAM OXALATE 10 MG/1
10 TABLET ORAL DAILY
Status: DISCONTINUED | OUTPATIENT
Start: 2023-01-01 | End: 2023-01-01 | Stop reason: HOSPADM

## 2023-01-01 RX ORDER — ACETAMINOPHEN 325 MG/1
650 TABLET ORAL EVERY 6 HOURS PRN
Status: DISCONTINUED | OUTPATIENT
Start: 2023-01-01 | End: 2023-01-01

## 2023-01-01 RX ORDER — PROCHLORPERAZINE MALEATE 5 MG
5 TABLET ORAL EVERY 6 HOURS PRN
Status: DISCONTINUED | OUTPATIENT
Start: 2023-01-01 | End: 2023-11-05 | Stop reason: HOSPADM

## 2023-01-01 RX ORDER — HYDROXYZINE HYDROCHLORIDE 25 MG/1
50 TABLET, FILM COATED ORAL EVERY 6 HOURS PRN
Status: DISCONTINUED | OUTPATIENT
Start: 2023-01-01 | End: 2023-01-01 | Stop reason: HOSPADM

## 2023-01-01 RX ORDER — SPIRONOLACTONE 25 MG/1
25 TABLET ORAL DAILY
Qty: 30 TABLET | Refills: 0 | Status: SHIPPED | OUTPATIENT
Start: 2023-01-01

## 2023-01-01 RX ORDER — OLANZAPINE 10 MG/2ML
5 INJECTION, POWDER, FOR SOLUTION INTRAMUSCULAR ONCE
Status: COMPLETED | OUTPATIENT
Start: 2023-01-01 | End: 2023-01-01

## 2023-01-01 RX ORDER — OLANZAPINE 5 MG/1
5 TABLET ORAL AT BEDTIME
Qty: 30 TABLET | Refills: 3 | Status: SHIPPED | OUTPATIENT
Start: 2023-01-01

## 2023-01-01 RX ORDER — HYDROXYZINE HYDROCHLORIDE 25 MG/1
50 TABLET, FILM COATED ORAL 3 TIMES DAILY
Status: DISCONTINUED | OUTPATIENT
Start: 2023-01-01 | End: 2023-01-01

## 2023-01-01 RX ORDER — HYDROMORPHONE HYDROCHLORIDE 1 MG/ML
0.5 INJECTION, SOLUTION INTRAMUSCULAR; INTRAVENOUS; SUBCUTANEOUS ONCE
Status: DISCONTINUED | OUTPATIENT
Start: 2023-01-01 | End: 2023-01-01 | Stop reason: ALTCHOICE

## 2023-01-01 RX ORDER — TRAMADOL HYDROCHLORIDE 50 MG/1
50 TABLET ORAL 2 TIMES DAILY PRN
Qty: 60 TABLET | Refills: 0 | Status: SHIPPED | OUTPATIENT
Start: 2023-01-01 | End: 2023-01-01

## 2023-01-01 RX ORDER — METHYLPREDNISOLONE SODIUM SUCCINATE 125 MG/2ML
80 INJECTION, POWDER, LYOPHILIZED, FOR SOLUTION INTRAMUSCULAR; INTRAVENOUS ONCE
Status: COMPLETED | OUTPATIENT
Start: 2023-01-01 | End: 2023-01-01

## 2023-01-01 RX ORDER — FAMOTIDINE 20 MG/1
20 TABLET, FILM COATED ORAL EVERY OTHER DAY
Qty: 90 TABLET | Refills: 0
Start: 2023-01-01

## 2023-01-01 RX ORDER — METHYLPREDNISOLONE SODIUM SUCCINATE 40 MG/ML
24 INJECTION, POWDER, LYOPHILIZED, FOR SOLUTION INTRAMUSCULAR; INTRAVENOUS ONCE
Status: COMPLETED | OUTPATIENT
Start: 2023-01-01 | End: 2023-01-01

## 2023-01-01 RX ORDER — HYDROXYZINE HYDROCHLORIDE 25 MG/1
25 TABLET, FILM COATED ORAL EVERY 6 HOURS PRN
Status: DISCONTINUED | OUTPATIENT
Start: 2023-01-01 | End: 2023-01-01 | Stop reason: HOSPADM

## 2023-01-01 RX ORDER — TRAMADOL HYDROCHLORIDE 50 MG/1
TABLET ORAL
Qty: 11 TABLET | Refills: 0 | Status: SHIPPED | OUTPATIENT
Start: 2023-01-01 | End: 2023-01-01

## 2023-01-01 RX ORDER — DOXYCYCLINE 100 MG/1
100 CAPSULE ORAL 2 TIMES DAILY
Qty: 6 CAPSULE | Refills: 0 | Status: SHIPPED | OUTPATIENT
Start: 2023-01-01 | End: 2023-01-01

## 2023-01-01 RX ORDER — HYDROMORPHONE HYDROCHLORIDE 1 MG/ML
1 SOLUTION ORAL 3 TIMES DAILY PRN
Status: DISCONTINUED | OUTPATIENT
Start: 2023-01-01 | End: 2023-01-01 | Stop reason: HOSPADM

## 2023-01-01 RX ORDER — GADOBUTROL 604.72 MG/ML
5 INJECTION INTRAVENOUS ONCE
Status: DISCONTINUED | OUTPATIENT
Start: 2023-01-01 | End: 2023-01-01

## 2023-01-01 RX ORDER — ALBUTEROL SULFATE 90 UG/1
2 AEROSOL, METERED RESPIRATORY (INHALATION)
Status: ON HOLD | COMMUNITY
Start: 2023-01-01 | End: 2023-01-01

## 2023-01-01 RX ORDER — ONDANSETRON 4 MG/1
4 TABLET, ORALLY DISINTEGRATING ORAL EVERY 6 HOURS PRN
Status: DISCONTINUED | OUTPATIENT
Start: 2023-01-01 | End: 2023-01-01 | Stop reason: HOSPADM

## 2023-01-01 RX ORDER — PREDNISONE 20 MG/1
TABLET ORAL
Status: ON HOLD | COMMUNITY
Start: 2023-01-01 | End: 2023-01-01

## 2023-01-01 RX ORDER — VANCOMYCIN HYDROCHLORIDE 1 G/200ML
1000 INJECTION, SOLUTION INTRAVENOUS
Status: DISCONTINUED | OUTPATIENT
Start: 2023-01-01 | End: 2023-01-01

## 2023-01-01 RX ORDER — BUMETANIDE 0.25 MG/ML
1 INJECTION INTRAMUSCULAR; INTRAVENOUS
Status: COMPLETED | OUTPATIENT
Start: 2023-01-01 | End: 2023-01-01

## 2023-01-01 RX ORDER — ONDANSETRON 4 MG/1
4 TABLET, FILM COATED ORAL EVERY 6 HOURS PRN
Status: DISCONTINUED | OUTPATIENT
Start: 2023-01-01 | End: 2023-01-01 | Stop reason: HOSPADM

## 2023-01-01 RX ORDER — FUROSEMIDE 40 MG
40 TABLET ORAL 2 TIMES DAILY
Status: DISCONTINUED | OUTPATIENT
Start: 2023-01-01 | End: 2023-01-01 | Stop reason: HOSPADM

## 2023-01-01 RX ORDER — SODIUM CHLORIDE 9 MG/ML
INJECTION, SOLUTION INTRAVENOUS ONCE
Status: COMPLETED | OUTPATIENT
Start: 2023-01-01 | End: 2023-01-01

## 2023-01-01 RX ORDER — NALOXONE HYDROCHLORIDE 0.4 MG/ML
0.1 INJECTION, SOLUTION INTRAMUSCULAR; INTRAVENOUS; SUBCUTANEOUS
Status: DISCONTINUED | OUTPATIENT
Start: 2023-01-01 | End: 2023-11-05 | Stop reason: HOSPADM

## 2023-01-01 RX ORDER — DOXYCYCLINE 100 MG/1
100 CAPSULE ORAL 2 TIMES DAILY
Qty: 20 CAPSULE | Refills: 0 | Status: SHIPPED | OUTPATIENT
Start: 2023-01-01 | End: 2023-01-01

## 2023-01-01 RX ORDER — FUROSEMIDE 10 MG/ML
20 INJECTION INTRAMUSCULAR; INTRAVENOUS DAILY
Status: DISCONTINUED | OUTPATIENT
Start: 2023-01-01 | End: 2023-01-01

## 2023-01-01 RX ORDER — CEFTRIAXONE 1 G/1
1 INJECTION, POWDER, FOR SOLUTION INTRAMUSCULAR; INTRAVENOUS EVERY 24 HOURS
Status: DISCONTINUED | OUTPATIENT
Start: 2023-01-01 | End: 2023-01-01

## 2023-01-01 RX ORDER — PREDNISONE 20 MG/1
TABLET ORAL
Qty: 15 TABLET | Refills: 0 | Status: SHIPPED | OUTPATIENT
Start: 2023-01-01 | End: 2023-01-01

## 2023-01-01 RX ORDER — IPRATROPIUM BROMIDE AND ALBUTEROL SULFATE 2.5; .5 MG/3ML; MG/3ML
3 SOLUTION RESPIRATORY (INHALATION)
Status: DISCONTINUED | OUTPATIENT
Start: 2023-01-01 | End: 2023-01-01 | Stop reason: HOSPADM

## 2023-01-01 RX ORDER — PREDNISONE 20 MG/1
40 TABLET ORAL DAILY
Status: DISCONTINUED | OUTPATIENT
Start: 2023-01-01 | End: 2023-01-01

## 2023-01-01 RX ORDER — OXYCODONE HCL 20 MG/ML
10 CONCENTRATE, ORAL ORAL
Status: DISCONTINUED | OUTPATIENT
Start: 2023-01-01 | End: 2023-11-05 | Stop reason: HOSPADM

## 2023-01-01 RX ORDER — METHYLPREDNISOLONE SODIUM SUCCINATE 40 MG/ML
16 INJECTION, POWDER, LYOPHILIZED, FOR SOLUTION INTRAMUSCULAR; INTRAVENOUS DAILY
Status: DISCONTINUED | OUTPATIENT
Start: 2023-01-01 | End: 2023-11-05 | Stop reason: HOSPADM

## 2023-01-01 RX ORDER — FLUTICASONE PROPIONATE 50 MCG
1 SPRAY, SUSPENSION (ML) NASAL DAILY PRN
COMMUNITY

## 2023-01-01 RX ORDER — FUROSEMIDE 40 MG
80 TABLET ORAL
Status: DISCONTINUED | OUTPATIENT
Start: 2023-01-01 | End: 2023-01-01 | Stop reason: HOSPADM

## 2023-01-01 RX ORDER — TRAMADOL HYDROCHLORIDE 50 MG/1
50 TABLET ORAL 3 TIMES DAILY PRN
COMMUNITY
End: 2023-01-01

## 2023-01-01 RX ORDER — BUMETANIDE 1 MG/1
1 TABLET ORAL
Qty: 60 TABLET | Refills: 0 | Status: ON HOLD | OUTPATIENT
Start: 2023-01-01 | End: 2023-01-01

## 2023-01-01 RX ORDER — LOPERAMIDE HCL 2 MG
2 CAPSULE ORAL 4 TIMES DAILY PRN
Status: DISCONTINUED | OUTPATIENT
Start: 2023-01-01 | End: 2023-01-01 | Stop reason: HOSPADM

## 2023-01-01 RX ORDER — DIMENHYDRINATE 50 MG/ML
25 INJECTION, SOLUTION INTRAMUSCULAR; INTRAVENOUS ONCE
Status: COMPLETED | OUTPATIENT
Start: 2023-01-01 | End: 2023-01-01

## 2023-01-01 RX ORDER — LORAZEPAM 2 MG/ML
0.5 CONCENTRATE ORAL
Status: DISCONTINUED | OUTPATIENT
Start: 2023-01-01 | End: 2023-11-05 | Stop reason: HOSPADM

## 2023-01-01 RX ORDER — AZELASTINE 1 MG/ML
1 SPRAY, METERED NASAL 2 TIMES DAILY
Status: DISCONTINUED | OUTPATIENT
Start: 2023-01-01 | End: 2023-01-01

## 2023-01-01 RX ORDER — BENZONATATE 100 MG/1
100 CAPSULE ORAL 3 TIMES DAILY PRN
Qty: 90 CAPSULE | Refills: 3 | Status: SHIPPED | OUTPATIENT
Start: 2023-01-01

## 2023-01-01 RX ORDER — GABAPENTIN 600 MG/1
600 TABLET ORAL AT BEDTIME
Status: ON HOLD | COMMUNITY
End: 2023-01-01

## 2023-01-01 RX ORDER — GABAPENTIN 300 MG/1
300 CAPSULE ORAL 3 TIMES DAILY
Qty: 90 CAPSULE | Refills: 4 | Status: ON HOLD | OUTPATIENT
Start: 2023-01-01 | End: 2023-01-01

## 2023-01-01 RX ORDER — MORPHINE SULFATE 20 MG/ML
10 SOLUTION ORAL
Status: DISCONTINUED | OUTPATIENT
Start: 2023-01-01 | End: 2023-11-05 | Stop reason: HOSPADM

## 2023-01-01 RX ORDER — ESCITALOPRAM OXALATE 5 MG/1
5 TABLET ORAL DAILY
Qty: 90 TABLET | Refills: 1 | Status: SHIPPED | OUTPATIENT
Start: 2023-01-01 | End: 2023-01-01

## 2023-01-01 RX ORDER — HYDROXYZINE HYDROCHLORIDE 25 MG/1
50 TABLET, FILM COATED ORAL EVERY 6 HOURS PRN
Status: DISCONTINUED | OUTPATIENT
Start: 2023-01-01 | End: 2023-01-01

## 2023-01-01 RX ORDER — HYDROMORPHONE HCL IN WATER/PF 6 MG/30 ML
0.2 PATIENT CONTROLLED ANALGESIA SYRINGE INTRAVENOUS ONCE
Status: COMPLETED | OUTPATIENT
Start: 2023-01-01 | End: 2023-01-01

## 2023-01-01 RX ORDER — METHYLPREDNISOLONE SODIUM SUCCINATE 125 MG/2ML
125 INJECTION, POWDER, LYOPHILIZED, FOR SOLUTION INTRAMUSCULAR; INTRAVENOUS ONCE
Status: COMPLETED | OUTPATIENT
Start: 2023-01-01 | End: 2023-01-01

## 2023-01-01 RX ORDER — NYSTATIN 100000 U/G
CREAM TOPICAL 3 TIMES DAILY
Qty: 30 G | Refills: 3 | Status: ON HOLD | OUTPATIENT
Start: 2023-01-01 | End: 2023-01-01

## 2023-01-01 RX ORDER — CALCIUM CARBONATE 500 MG/1
1000 TABLET, CHEWABLE ORAL DAILY PRN
Status: DISCONTINUED | OUTPATIENT
Start: 2023-01-01 | End: 2023-01-01 | Stop reason: HOSPADM

## 2023-01-01 RX ORDER — LORAZEPAM 2 MG/ML
0.5 INJECTION INTRAMUSCULAR EVERY 6 HOURS PRN
Status: DISCONTINUED | OUTPATIENT
Start: 2023-01-01 | End: 2023-01-01

## 2023-01-01 RX ORDER — LORAZEPAM 2 MG/ML
0.5 INJECTION INTRAMUSCULAR ONCE
Status: COMPLETED | OUTPATIENT
Start: 2023-01-01 | End: 2023-01-01

## 2023-01-01 RX ORDER — CEFDINIR 300 MG/1
300 CAPSULE ORAL DAILY
Qty: 5 CAPSULE | Refills: 0 | Status: SHIPPED | OUTPATIENT
Start: 2023-01-01 | End: 2023-01-01

## 2023-01-01 RX ORDER — PIPERACILLIN SODIUM, TAZOBACTAM SODIUM 3; .375 G/15ML; G/15ML
3.38 INJECTION, POWDER, LYOPHILIZED, FOR SOLUTION INTRAVENOUS EVERY 12 HOURS
Status: DISCONTINUED | OUTPATIENT
Start: 2023-01-01 | End: 2023-01-01

## 2023-01-01 RX ORDER — HYDRALAZINE HYDROCHLORIDE 20 MG/ML
10 INJECTION INTRAMUSCULAR; INTRAVENOUS EVERY 6 HOURS PRN
Status: DISCONTINUED | OUTPATIENT
Start: 2023-01-01 | End: 2023-01-01

## 2023-01-01 RX ORDER — CEFTRIAXONE 1 G/1
1 INJECTION, POWDER, FOR SOLUTION INTRAMUSCULAR; INTRAVENOUS ONCE
Status: COMPLETED | OUTPATIENT
Start: 2023-01-01 | End: 2023-01-01

## 2023-01-01 RX ORDER — AMOXICILLIN AND CLAVULANATE POTASSIUM 500; 125 MG/1; MG/1
1 TABLET, FILM COATED ORAL EVERY 12 HOURS SCHEDULED
Status: DISCONTINUED | OUTPATIENT
Start: 2023-01-01 | End: 2023-01-01 | Stop reason: HOSPADM

## 2023-01-01 RX ORDER — MIRABEGRON 50 MG/1
50 TABLET, EXTENDED RELEASE ORAL DAILY
Qty: 90 TABLET | Refills: 3 | Status: ON HOLD | OUTPATIENT
Start: 2023-01-01 | End: 2023-01-01

## 2023-01-01 RX ORDER — SODIUM CHLORIDE 9 MG/ML
INJECTION, SOLUTION INTRAVENOUS CONTINUOUS
Status: DISCONTINUED | OUTPATIENT
Start: 2023-01-01 | End: 2023-11-05 | Stop reason: HOSPADM

## 2023-01-01 RX ORDER — PREDNISONE 10 MG/1
10 TABLET ORAL DAILY
Qty: 5 TABLET | Refills: 0 | Status: SHIPPED | OUTPATIENT
Start: 2023-01-01 | End: 2023-01-01

## 2023-01-01 RX ORDER — MIRABEGRON 25 MG/1
25 TABLET, FILM COATED, EXTENDED RELEASE ORAL DAILY
Qty: 30 TABLET | Refills: 0 | Status: SHIPPED | OUTPATIENT
Start: 2023-01-01 | End: 2023-01-01

## 2023-01-01 RX ORDER — TRAMADOL HYDROCHLORIDE 50 MG/1
50 TABLET ORAL ONCE
Status: COMPLETED | OUTPATIENT
Start: 2023-01-01 | End: 2023-01-01

## 2023-01-01 RX ORDER — FLUTICASONE FUROATE AND VILANTEROL 200; 25 UG/1; UG/1
1 POWDER RESPIRATORY (INHALATION) DAILY
Status: DISCONTINUED | OUTPATIENT
Start: 2023-01-01 | End: 2023-01-01 | Stop reason: HOSPADM

## 2023-01-01 RX ORDER — BUPRENORPHINE 10 UG/H
1 PATCH TRANSDERMAL
Qty: 4 PATCH | Refills: 0 | Status: SHIPPED | OUTPATIENT
Start: 2023-01-01 | End: 2023-01-01

## 2023-01-01 RX ORDER — NOREPINEPHRINE BITARTRATE 0.02 MG/ML
.01-.6 INJECTION, SOLUTION INTRAVENOUS CONTINUOUS
Status: DISCONTINUED | OUTPATIENT
Start: 2023-01-01 | End: 2023-01-01 | Stop reason: HOSPADM

## 2023-01-01 RX ORDER — GABAPENTIN 300 MG/1
300 CAPSULE ORAL AT BEDTIME
Status: DISCONTINUED | OUTPATIENT
Start: 2023-01-01 | End: 2023-01-01 | Stop reason: HOSPADM

## 2023-01-01 RX ORDER — FUROSEMIDE 40 MG
40 TABLET ORAL 2 TIMES DAILY
COMMUNITY
End: 2023-01-01

## 2023-01-01 RX ORDER — GLYCOPYRROLATE 0.2 MG/ML
0.2 INJECTION, SOLUTION INTRAMUSCULAR; INTRAVENOUS EVERY 4 HOURS PRN
Status: DISCONTINUED | OUTPATIENT
Start: 2023-01-01 | End: 2023-11-05 | Stop reason: HOSPADM

## 2023-01-01 RX ORDER — BISACODYL 10 MG
10 SUPPOSITORY, RECTAL RECTAL DAILY PRN
Status: DISCONTINUED | OUTPATIENT
Start: 2023-01-01 | End: 2023-01-01 | Stop reason: HOSPADM

## 2023-01-01 RX ORDER — FUROSEMIDE 20 MG
20 TABLET ORAL DAILY
Status: DISCONTINUED | OUTPATIENT
Start: 2023-01-01 | End: 2023-01-01

## 2023-01-01 RX ORDER — LORAZEPAM 2 MG/ML
.5-1 INJECTION INTRAMUSCULAR CONTINUOUS PRN
Status: DISCONTINUED | OUTPATIENT
Start: 2023-01-01 | End: 2023-01-01

## 2023-01-01 RX ORDER — BUPRENORPHINE 20 UG/H
1 PATCH TRANSDERMAL
Qty: 4 PATCH | Refills: 1 | OUTPATIENT
Start: 2023-01-01

## 2023-01-01 RX ORDER — ACETYLCYSTEINE 100 MG/ML
4 SOLUTION ORAL; RESPIRATORY (INHALATION) EVERY 4 HOURS
Status: DISCONTINUED | OUTPATIENT
Start: 2023-01-01 | End: 2023-01-01 | Stop reason: HOSPADM

## 2023-01-01 RX ORDER — TRAMADOL HYDROCHLORIDE 50 MG/1
50 TABLET ORAL 3 TIMES DAILY PRN
Status: DISCONTINUED | OUTPATIENT
Start: 2023-01-01 | End: 2023-01-01 | Stop reason: HOSPADM

## 2023-01-01 RX ORDER — ACETAMINOPHEN 325 MG/1
650 TABLET ORAL ONCE
Status: COMPLETED | OUTPATIENT
Start: 2023-01-01 | End: 2023-01-01

## 2023-01-01 RX ORDER — FUROSEMIDE 10 MG/ML
40 INJECTION INTRAMUSCULAR; INTRAVENOUS ONCE
Status: COMPLETED | OUTPATIENT
Start: 2023-01-01 | End: 2023-01-01

## 2023-01-01 RX ORDER — SOLIFENACIN SUCCINATE 5 MG/1
5 TABLET, FILM COATED ORAL DAILY
Qty: 90 TABLET | Refills: 3 | Status: SHIPPED | OUTPATIENT
Start: 2023-01-01 | End: 2023-01-01

## 2023-01-01 RX ORDER — BUPRENORPHINE 5 UG/H
1 PATCH TRANSDERMAL
Qty: 4 PATCH | Refills: 1 | Status: SHIPPED | OUTPATIENT
Start: 2023-01-01 | End: 2023-01-01 | Stop reason: DRUGHIGH

## 2023-01-01 RX ORDER — LORAZEPAM 2 MG/ML
.5-1 INJECTION INTRAMUSCULAR EVERY 30 MIN PRN
Status: DISCONTINUED | OUTPATIENT
Start: 2023-01-01 | End: 2023-11-05 | Stop reason: HOSPADM

## 2023-01-01 RX ORDER — NITROGLYCERIN 0.4 MG/1
0.4 TABLET SUBLINGUAL EVERY 5 MIN PRN
Status: DISCONTINUED | OUTPATIENT
Start: 2023-01-01 | End: 2023-01-01 | Stop reason: HOSPADM

## 2023-01-01 RX ORDER — IPRATROPIUM BROMIDE AND ALBUTEROL SULFATE 2.5; .5 MG/3ML; MG/3ML
1 SOLUTION RESPIRATORY (INHALATION) EVERY 6 HOURS PRN
Status: DISCONTINUED | OUTPATIENT
Start: 2023-01-01 | End: 2023-01-01 | Stop reason: HOSPADM

## 2023-01-01 RX ORDER — ACETAMINOPHEN 325 MG/1
975 TABLET ORAL 3 TIMES DAILY
Status: DISCONTINUED | OUTPATIENT
Start: 2023-01-01 | End: 2023-01-01 | Stop reason: HOSPADM

## 2023-01-01 RX ORDER — OLANZAPINE 5 MG/1
5 TABLET ORAL AT BEDTIME
Qty: 30 TABLET | Refills: 3 | OUTPATIENT
Start: 2023-01-01

## 2023-01-01 RX ORDER — TRAMADOL HYDROCHLORIDE 50 MG/1
50 TABLET ORAL 2 TIMES DAILY
COMMUNITY
End: 2023-01-01

## 2023-01-01 RX ORDER — MIRABEGRON 50 MG/1
50 TABLET, EXTENDED RELEASE ORAL DAILY
Qty: 90 TABLET | Refills: 1 | Status: CANCELLED | OUTPATIENT
Start: 2023-01-01

## 2023-01-01 RX ORDER — ACETAMINOPHEN 325 MG/1
975 TABLET ORAL ONCE
Status: COMPLETED | OUTPATIENT
Start: 2023-01-01 | End: 2023-01-01

## 2023-01-01 RX ORDER — ACETYLCYSTEINE 100 MG/ML
4 SOLUTION ORAL; RESPIRATORY (INHALATION) EVERY 4 HOURS
Status: DISCONTINUED | OUTPATIENT
Start: 2023-01-01 | End: 2023-01-01

## 2023-01-01 RX ORDER — ACETAMINOPHEN 325 MG/1
650 TABLET ORAL EVERY 6 HOURS
Status: DISCONTINUED | OUTPATIENT
Start: 2023-01-01 | End: 2023-01-01

## 2023-01-01 RX ORDER — IPRATROPIUM BROMIDE AND ALBUTEROL SULFATE 2.5; .5 MG/3ML; MG/3ML
1 SOLUTION RESPIRATORY (INHALATION) EVERY 6 HOURS PRN
COMMUNITY

## 2023-01-01 RX ORDER — TRAMADOL HYDROCHLORIDE 50 MG/1
50 TABLET ORAL 2 TIMES DAILY PRN
Status: DISCONTINUED | OUTPATIENT
Start: 2023-01-01 | End: 2023-01-01 | Stop reason: HOSPADM

## 2023-01-01 RX ORDER — PROCHLORPERAZINE MALEATE 10 MG
10 TABLET ORAL EVERY 6 HOURS PRN
Qty: 30 TABLET | Refills: 5 | Status: SHIPPED | OUTPATIENT
Start: 2023-01-01 | End: 2023-01-01

## 2023-01-01 RX ORDER — GABAPENTIN 600 MG/1
600 TABLET ORAL AT BEDTIME
Qty: 30 TABLET | Refills: 0 | Status: ON HOLD | OUTPATIENT
Start: 2023-01-01 | End: 2023-01-01

## 2023-01-01 RX ORDER — SALIVA STIMULANT COMB. NO.3
1 SPRAY, NON-AEROSOL (ML) MUCOUS MEMBRANE 4 TIMES DAILY PRN
Status: DISCONTINUED | OUTPATIENT
Start: 2023-01-01 | End: 2023-11-05 | Stop reason: HOSPADM

## 2023-01-01 RX ORDER — TRAMADOL HYDROCHLORIDE 50 MG/1
50 TABLET ORAL 2 TIMES DAILY PRN
Status: DISCONTINUED | OUTPATIENT
Start: 2023-01-01 | End: 2023-01-01

## 2023-01-01 RX ORDER — METOPROLOL SUCCINATE 50 MG/1
50 TABLET, EXTENDED RELEASE ORAL AT BEDTIME
Qty: 30 TABLET | Refills: 0 | Status: SHIPPED | OUTPATIENT
Start: 2023-01-01

## 2023-01-01 RX ORDER — DIPHENOXYLATE HCL/ATROPINE 2.5-.025MG
1 TABLET ORAL 4 TIMES DAILY PRN
Status: DISCONTINUED | OUTPATIENT
Start: 2023-01-01 | End: 2023-01-01 | Stop reason: HOSPADM

## 2023-01-01 RX ORDER — LIDOCAINE 4 G/G
2 PATCH TOPICAL
Status: DISCONTINUED | OUTPATIENT
Start: 2023-01-01 | End: 2023-01-01 | Stop reason: HOSPADM

## 2023-01-01 RX ORDER — HYDROMORPHONE HYDROCHLORIDE 1 MG/ML
1 SOLUTION ORAL 3 TIMES DAILY PRN
Status: ON HOLD | COMMUNITY
End: 2023-01-01

## 2023-01-01 RX ORDER — ACETYLCYSTEINE 100 MG/ML
4 SOLUTION ORAL; RESPIRATORY (INHALATION) EVERY 4 HOURS PRN
Status: DISCONTINUED | OUTPATIENT
Start: 2023-01-01 | End: 2023-01-01 | Stop reason: HOSPADM

## 2023-01-01 RX ORDER — LORAZEPAM 0.5 MG/1
0.5 TABLET ORAL EVERY 6 HOURS PRN
Qty: 12 TABLET | Refills: 0 | Status: SHIPPED | OUTPATIENT
Start: 2023-01-01

## 2023-01-01 RX ORDER — OXYCODONE HYDROCHLORIDE 5 MG/1
10 TABLET ORAL EVERY 4 HOURS PRN
Status: DISCONTINUED | OUTPATIENT
Start: 2023-01-01 | End: 2023-01-01

## 2023-01-01 RX ORDER — AZELASTINE 1 MG/ML
1 SPRAY, METERED NASAL
Status: ON HOLD | COMMUNITY
Start: 2023-01-01 | End: 2023-01-01

## 2023-01-01 RX ORDER — AZELASTINE 1 MG/ML
1 SPRAY, METERED NASAL 2 TIMES DAILY PRN
Status: DISCONTINUED | OUTPATIENT
Start: 2023-01-01 | End: 2023-01-01 | Stop reason: HOSPADM

## 2023-01-01 RX ORDER — VANCOMYCIN HYDROCHLORIDE 1 G/200ML
1000 INJECTION, SOLUTION INTRAVENOUS ONCE
Status: COMPLETED | OUTPATIENT
Start: 2023-01-01 | End: 2023-01-01

## 2023-01-01 RX ORDER — CETIRIZINE HYDROCHLORIDE 5 MG/1
5 TABLET ORAL DAILY PRN
Start: 2023-01-01

## 2023-01-01 RX ORDER — HYDROMORPHONE HYDROCHLORIDE 1 MG/ML
1 SOLUTION ORAL 3 TIMES DAILY
Qty: 90 ML | Refills: 0 | Status: SHIPPED | OUTPATIENT
Start: 2023-01-01 | End: 2023-01-01

## 2023-01-01 RX ORDER — LORAZEPAM 1 MG/1
1 TABLET ORAL ONCE
Status: COMPLETED | OUTPATIENT
Start: 2023-01-01 | End: 2023-01-01

## 2023-01-01 RX ORDER — AMOXICILLIN 250 MG
1 CAPSULE ORAL 2 TIMES DAILY
Status: DISCONTINUED | OUTPATIENT
Start: 2023-01-01 | End: 2023-01-01 | Stop reason: HOSPADM

## 2023-01-01 RX ORDER — ALBUTEROL SULFATE 0.83 MG/ML
2.5 SOLUTION RESPIRATORY (INHALATION) EVERY 4 HOURS PRN
Status: DISCONTINUED | OUTPATIENT
Start: 2023-01-01 | End: 2023-01-01 | Stop reason: HOSPADM

## 2023-01-01 RX ORDER — CYCLOBENZAPRINE HYDROCHLORIDE 5 MG/1
2.5 TABLET, FILM COATED ORAL EVERY 8 HOURS PRN
Status: DISCONTINUED | OUTPATIENT
Start: 2023-01-01 | End: 2023-01-01 | Stop reason: HOSPADM

## 2023-01-01 RX ORDER — VANCOMYCIN HYDROCHLORIDE 500 MG/10ML
500 INJECTION, POWDER, LYOPHILIZED, FOR SOLUTION INTRAVENOUS
Status: COMPLETED | OUTPATIENT
Start: 2023-01-01 | End: 2023-01-01

## 2023-01-01 RX ORDER — MECOBALAMIN 5000 MCG
15 TABLET,DISINTEGRATING ORAL AT BEDTIME
COMMUNITY

## 2023-01-01 RX ORDER — FUROSEMIDE 40 MG
80 TABLET ORAL
Status: DISCONTINUED | OUTPATIENT
Start: 2023-01-01 | End: 2023-01-01

## 2023-01-01 RX ORDER — LORAZEPAM 2 MG/ML
.5-1 CONCENTRATE ORAL EVERY 30 MIN PRN
Status: DISCONTINUED | OUTPATIENT
Start: 2023-01-01 | End: 2023-11-05 | Stop reason: HOSPADM

## 2023-01-01 RX ORDER — HEPARIN SODIUM 5000 [USP'U]/.5ML
5000 INJECTION, SOLUTION INTRAVENOUS; SUBCUTANEOUS EVERY 12 HOURS
Status: DISCONTINUED | OUTPATIENT
Start: 2023-01-01 | End: 2023-01-01

## 2023-01-01 RX ORDER — ALBUTEROL SULFATE 90 UG/1
2 AEROSOL, METERED RESPIRATORY (INHALATION) EVERY 6 HOURS PRN
COMMUNITY

## 2023-01-01 RX ORDER — FUROSEMIDE 10 MG/ML
20 INJECTION INTRAMUSCULAR; INTRAVENOUS ONCE
Status: COMPLETED | OUTPATIENT
Start: 2023-01-01 | End: 2023-01-01

## 2023-01-01 RX ORDER — FAMOTIDINE 20 MG/1
20 TABLET, FILM COATED ORAL AT BEDTIME
Status: DISCONTINUED | OUTPATIENT
Start: 2023-01-01 | End: 2023-01-01 | Stop reason: HOSPADM

## 2023-01-01 RX ORDER — TAMOXIFEN CITRATE 20 MG/1
20 TABLET ORAL DAILY
Qty: 90 TABLET | Refills: 3 | Status: SHIPPED | OUTPATIENT
Start: 2023-01-01 | End: 2023-01-01

## 2023-01-01 RX ORDER — FUROSEMIDE 20 MG
40 TABLET ORAL
Status: DISCONTINUED | OUTPATIENT
Start: 2023-01-01 | End: 2023-01-01 | Stop reason: HOSPADM

## 2023-01-01 RX ORDER — BISACODYL 10 MG
10 SUPPOSITORY, RECTAL RECTAL DAILY PRN
COMMUNITY

## 2023-01-01 RX ORDER — POTASSIUM CHLORIDE 1500 MG/1
20 TABLET, EXTENDED RELEASE ORAL ONCE
Status: DISCONTINUED | OUTPATIENT
Start: 2023-01-01 | End: 2023-01-01

## 2023-01-01 RX ORDER — MENTHOL AND METHYL SALICYLATE 7.6; 29 G/100G; G/100G
OINTMENT TOPICAL EVERY 6 HOURS PRN
Status: DISCONTINUED | OUTPATIENT
Start: 2023-01-01 | End: 2023-01-01 | Stop reason: HOSPADM

## 2023-01-01 RX ORDER — SODIUM CHLORIDE FOR INHALATION 0.9 %
3 VIAL, NEBULIZER (ML) INHALATION
Status: DISCONTINUED | OUTPATIENT
Start: 2023-01-01 | End: 2023-01-01 | Stop reason: HOSPADM

## 2023-01-01 RX ORDER — LORAZEPAM 2 MG/ML
0.5 INJECTION INTRAMUSCULAR ONCE
Status: DISCONTINUED | OUTPATIENT
Start: 2023-01-01 | End: 2023-01-01

## 2023-01-01 RX ORDER — TAMOXIFEN CITRATE 10 MG/1
20 TABLET ORAL
Status: DISCONTINUED | OUTPATIENT
Start: 2023-01-01 | End: 2023-01-01 | Stop reason: HOSPADM

## 2023-01-01 RX ORDER — FENTANYL CITRATE 50 UG/ML
0.5 INJECTION, SOLUTION INTRAMUSCULAR; INTRAVENOUS
Status: DISCONTINUED | OUTPATIENT
Start: 2023-01-01 | End: 2023-01-01

## 2023-01-01 RX ORDER — PREDNISONE 20 MG/1
40 TABLET ORAL DAILY
Qty: 6 TABLET | Refills: 0 | Status: SHIPPED | OUTPATIENT
Start: 2023-01-01 | End: 2023-01-01

## 2023-01-01 RX ORDER — PREDNISONE 20 MG/1
40 TABLET ORAL DAILY
Qty: 4 TABLET | Refills: 0 | Status: SHIPPED | OUTPATIENT
Start: 2023-01-01 | End: 2023-01-01

## 2023-01-01 RX ORDER — HYDROMORPHONE HYDROCHLORIDE 1 MG/ML
0.3 INJECTION, SOLUTION INTRAMUSCULAR; INTRAVENOUS; SUBCUTANEOUS EVERY 30 MIN PRN
Status: DISCONTINUED | OUTPATIENT
Start: 2023-01-01 | End: 2023-11-05 | Stop reason: HOSPADM

## 2023-01-01 RX ORDER — PROCHLORPERAZINE 25 MG
12.5 SUPPOSITORY, RECTAL RECTAL EVERY 12 HOURS PRN
Status: DISCONTINUED | OUTPATIENT
Start: 2023-01-01 | End: 2023-11-05 | Stop reason: HOSPADM

## 2023-01-01 RX ORDER — FLUTICASONE FUROATE AND VILANTEROL 200; 25 UG/1; UG/1
1 POWDER RESPIRATORY (INHALATION) AT BEDTIME
Status: DISCONTINUED | OUTPATIENT
Start: 2023-01-01 | End: 2023-01-01 | Stop reason: HOSPADM

## 2023-01-01 RX ORDER — ACETYLCYSTEINE 200 MG/ML
4 SOLUTION ORAL; RESPIRATORY (INHALATION) EVERY 6 HOURS
Status: DISCONTINUED | OUTPATIENT
Start: 2023-01-01 | End: 2023-01-01 | Stop reason: HOSPADM

## 2023-01-01 RX ORDER — FENTANYL CITRATE 50 UG/ML
50 INJECTION, SOLUTION INTRAMUSCULAR; INTRAVENOUS ONCE
Status: COMPLETED | OUTPATIENT
Start: 2023-01-01 | End: 2023-01-01

## 2023-01-01 RX ORDER — CEFTRIAXONE 1 G/1
1 INJECTION, POWDER, FOR SOLUTION INTRAMUSCULAR; INTRAVENOUS EVERY 24 HOURS
Status: DISCONTINUED | OUTPATIENT
Start: 2023-01-01 | End: 2023-01-01 | Stop reason: HOSPADM

## 2023-01-01 RX ORDER — SALIVA STIMULANT COMB. NO.3
2 SPRAY, NON-AEROSOL (ML) MUCOUS MEMBRANE
Status: DISCONTINUED | OUTPATIENT
Start: 2023-01-01 | End: 2023-11-05 | Stop reason: HOSPADM

## 2023-01-01 RX ORDER — ATROPINE SULFATE 10 MG/ML
2 SOLUTION/ DROPS OPHTHALMIC EVERY 4 HOURS PRN
Status: DISCONTINUED | OUTPATIENT
Start: 2023-01-01 | End: 2023-11-05 | Stop reason: HOSPADM

## 2023-01-01 RX ORDER — OXYCODONE HCL 20 MG/ML
10 CONCENTRATE, ORAL ORAL EVERY 4 HOURS
Status: DISCONTINUED | OUTPATIENT
Start: 2023-01-01 | End: 2023-01-01

## 2023-01-01 RX ORDER — SPIRONOLACTONE 25 MG/1
12.5 TABLET ORAL DAILY
Status: ON HOLD | COMMUNITY
End: 2023-01-01

## 2023-01-01 RX ORDER — ASPIRIN 325 MG
TABLET ORAL
Status: COMPLETED
Start: 2023-01-01 | End: 2023-01-01

## 2023-01-01 RX ORDER — FUROSEMIDE 40 MG
40 TABLET ORAL
Status: DISCONTINUED | OUTPATIENT
Start: 2023-01-01 | End: 2023-01-01 | Stop reason: HOSPADM

## 2023-01-01 RX ORDER — MIRABEGRON 25 MG/1
50 TABLET, FILM COATED, EXTENDED RELEASE ORAL DAILY
Status: DISCONTINUED | OUTPATIENT
Start: 2023-01-01 | End: 2023-01-01

## 2023-01-01 RX ORDER — LOSARTAN POTASSIUM 25 MG/1
25 TABLET ORAL DAILY
Status: DISCONTINUED | OUTPATIENT
Start: 2023-01-01 | End: 2023-01-01

## 2023-01-01 RX ORDER — TRAMADOL HYDROCHLORIDE 50 MG/1
50 TABLET ORAL 2 TIMES DAILY PRN
Status: ON HOLD | COMMUNITY
End: 2023-01-01

## 2023-01-01 RX ORDER — HYDROXYZINE HYDROCHLORIDE 25 MG/1
25 TABLET, FILM COATED ORAL EVERY 6 HOURS PRN
Status: DISCONTINUED | OUTPATIENT
Start: 2023-01-01 | End: 2023-01-01

## 2023-01-01 RX ORDER — PREDNISONE 20 MG/1
TABLET ORAL
Start: 2023-01-01 | End: 2023-11-10

## 2023-01-01 RX ORDER — OXYCODONE HCL 20 MG/ML
5-10 CONCENTRATE, ORAL ORAL
Status: DISCONTINUED | OUTPATIENT
Start: 2023-01-01 | End: 2023-11-05 | Stop reason: HOSPADM

## 2023-01-01 RX ORDER — HYDROMORPHONE HCL IN WATER/PF 6 MG/30 ML
0.2 PATIENT CONTROLLED ANALGESIA SYRINGE INTRAVENOUS 4 TIMES DAILY PRN
Status: DISCONTINUED | OUTPATIENT
Start: 2023-01-01 | End: 2023-01-01 | Stop reason: ALTCHOICE

## 2023-01-01 RX ORDER — GABAPENTIN 300 MG/1
300 CAPSULE ORAL 3 TIMES DAILY
Status: DISCONTINUED | OUTPATIENT
Start: 2023-01-01 | End: 2023-01-01

## 2023-01-01 RX ORDER — AZELASTINE HYDROCHLORIDE 137 UG/1
1 SPRAY, METERED NASAL 2 TIMES DAILY PRN
Status: DISCONTINUED | OUTPATIENT
Start: 2023-01-01 | End: 2023-01-01 | Stop reason: HOSPADM

## 2023-01-01 RX ORDER — ZINC OXIDE 20 %
OINTMENT (GRAM) TOPICAL 2 TIMES DAILY PRN
Status: DISCONTINUED | OUTPATIENT
Start: 2023-01-01 | End: 2023-01-01

## 2023-01-01 RX ORDER — DOXYCYCLINE 100 MG/1
100 CAPSULE ORAL DAILY
Status: COMPLETED | OUTPATIENT
Start: 2023-01-01 | End: 2023-01-01

## 2023-01-01 RX ORDER — IPRATROPIUM BROMIDE AND ALBUTEROL SULFATE 2.5; .5 MG/3ML; MG/3ML
SOLUTION RESPIRATORY (INHALATION)
Qty: 90 ML | Refills: 0 | Status: SHIPPED | OUTPATIENT
Start: 2023-01-01 | End: 2023-01-01 | Stop reason: DRUGHIGH

## 2023-01-01 RX ORDER — BUPRENORPHINE 7.5 UG/H
1 PATCH TRANSDERMAL
Qty: 4 PATCH | Refills: 0 | Status: SHIPPED | OUTPATIENT
Start: 2023-01-01 | End: 2023-01-01 | Stop reason: DRUGHIGH

## 2023-01-01 RX ORDER — CALCIUM CARBONATE 500 MG/1
500 TABLET, CHEWABLE ORAL DAILY PRN
Status: DISCONTINUED | OUTPATIENT
Start: 2023-01-01 | End: 2023-01-01 | Stop reason: HOSPADM

## 2023-01-01 RX ORDER — BUMETANIDE 0.25 MG/ML
1 INJECTION INTRAMUSCULAR; INTRAVENOUS EVERY 8 HOURS
Status: DISCONTINUED | OUTPATIENT
Start: 2023-01-01 | End: 2023-01-01

## 2023-01-01 RX ORDER — SODIUM CHLORIDE FOR INHALATION 0.9 %
3 VIAL, NEBULIZER (ML) INHALATION
COMMUNITY

## 2023-01-01 RX ORDER — DOXYCYCLINE 100 MG/1
100 CAPSULE ORAL 2 TIMES DAILY
Qty: 8 CAPSULE | Refills: 0 | Status: SHIPPED | OUTPATIENT
Start: 2023-01-01 | End: 2023-01-01

## 2023-01-01 RX ORDER — PROCHLORPERAZINE MALEATE 10 MG
10 TABLET ORAL EVERY 6 HOURS PRN
COMMUNITY

## 2023-01-01 RX ORDER — ESCITALOPRAM OXALATE 10 MG/1
10 TABLET ORAL DAILY
Qty: 90 TABLET | Refills: 3 | Status: SHIPPED | OUTPATIENT
Start: 2023-01-01

## 2023-01-01 RX ORDER — IPRATROPIUM BROMIDE AND ALBUTEROL SULFATE 2.5; .5 MG/3ML; MG/3ML
3 SOLUTION RESPIRATORY (INHALATION)
Status: ON HOLD | COMMUNITY
Start: 2023-01-01 | End: 2023-01-01

## 2023-01-01 RX ORDER — ACETAMINOPHEN 650 MG/1
650 SUPPOSITORY RECTAL EVERY 6 HOURS
Status: DISCONTINUED | OUTPATIENT
Start: 2023-01-01 | End: 2023-01-01

## 2023-01-01 RX ORDER — ACETAMINOPHEN 650 MG/1
650 SUPPOSITORY RECTAL EVERY 6 HOURS PRN
Status: DISCONTINUED | OUTPATIENT
Start: 2023-01-01 | End: 2023-01-01 | Stop reason: HOSPADM

## 2023-01-01 RX ADMIN — TRAMADOL HYDROCHLORIDE 50 MG: 50 TABLET, COATED ORAL at 07:27

## 2023-01-01 RX ADMIN — PIPERACILLIN AND TAZOBACTAM 3.38 G: 3; .375 INJECTION, POWDER, LYOPHILIZED, FOR SOLUTION INTRAVENOUS at 09:28

## 2023-01-01 RX ADMIN — FUROSEMIDE 40 MG: 40 TABLET ORAL at 20:48

## 2023-01-01 RX ADMIN — HYDROMORPHONE HYDROCHLORIDE 1 MG: 1 INJECTION, SOLUTION INTRAMUSCULAR; INTRAVENOUS; SUBCUTANEOUS at 22:07

## 2023-01-01 RX ADMIN — OLANZAPINE 5 MG: 5 TABLET, FILM COATED ORAL at 20:31

## 2023-01-01 RX ADMIN — LOPERAMIDE HYDROCHLORIDE 2 MG: 2 CAPSULE ORAL at 21:03

## 2023-01-01 RX ADMIN — TRAMADOL HYDROCHLORIDE 50 MG: 50 TABLET, COATED ORAL at 21:02

## 2023-01-01 RX ADMIN — POTASSIUM CHLORIDE 20 MEQ: 1500 TABLET, EXTENDED RELEASE ORAL at 08:52

## 2023-01-01 RX ADMIN — MIRABEGRON 25 MG: 25 TABLET, FILM COATED, EXTENDED RELEASE ORAL at 08:43

## 2023-01-01 RX ADMIN — ESCITALOPRAM OXALATE 10 MG: 10 TABLET ORAL at 15:57

## 2023-01-01 RX ADMIN — GABAPENTIN 300 MG: 300 CAPSULE ORAL at 20:40

## 2023-01-01 RX ADMIN — TAMOXIFEN CITRATE 20 MG: 10 TABLET ORAL at 09:05

## 2023-01-01 RX ADMIN — AZELASTINE 1 SPRAY: 1 SPRAY, METERED NASAL at 20:51

## 2023-01-01 RX ADMIN — FUROSEMIDE 80 MG: 10 INJECTION, SOLUTION INTRAMUSCULAR; INTRAVENOUS at 01:20

## 2023-01-01 RX ADMIN — METOPROLOL SUCCINATE 50 MG: 50 TABLET, EXTENDED RELEASE ORAL at 00:54

## 2023-01-01 RX ADMIN — DIPHENHYDRAMINE HYDROCHLORIDE 25 MG: 50 INJECTION, SOLUTION INTRAMUSCULAR; INTRAVENOUS at 10:47

## 2023-01-01 RX ADMIN — Medication: at 18:57

## 2023-01-01 RX ADMIN — MIRABEGRON 25 MG: 25 TABLET, FILM COATED, EXTENDED RELEASE ORAL at 08:31

## 2023-01-01 RX ADMIN — OLANZAPINE 5 MG: 5 TABLET, FILM COATED ORAL at 21:29

## 2023-01-01 RX ADMIN — OXYCODONE HYDROCHLORIDE 10 MG: 100 SOLUTION ORAL at 18:26

## 2023-01-01 RX ADMIN — PIPERACILLIN AND TAZOBACTAM 3.38 G: 3; .375 INJECTION, POWDER, LYOPHILIZED, FOR SOLUTION INTRAVENOUS at 16:33

## 2023-01-01 RX ADMIN — METOPROLOL SUCCINATE 50 MG: 50 TABLET, EXTENDED RELEASE ORAL at 21:24

## 2023-01-01 RX ADMIN — TRAMADOL HYDROCHLORIDE 50 MG: 50 TABLET, COATED ORAL at 05:29

## 2023-01-01 RX ADMIN — SIMVASTATIN 40 MG: 20 TABLET, FILM COATED ORAL at 22:45

## 2023-01-01 RX ADMIN — HYDROXYZINE HYDROCHLORIDE 50 MG: 25 TABLET, FILM COATED ORAL at 18:02

## 2023-01-01 RX ADMIN — TOLTERODINE TARTRATE 2 MG: 2 CAPSULE, EXTENDED RELEASE ORAL at 09:31

## 2023-01-01 RX ADMIN — IPRATROPIUM BROMIDE AND ALBUTEROL SULFATE 3 ML: .5; 3 SOLUTION RESPIRATORY (INHALATION) at 19:58

## 2023-01-01 RX ADMIN — GLYCOPYRROLATE 0.2 MG: 0.2 INJECTION, SOLUTION INTRAMUSCULAR; INTRAVENOUS at 12:07

## 2023-01-01 RX ADMIN — METOPROLOL SUCCINATE 75 MG: 50 TABLET, EXTENDED RELEASE ORAL at 23:49

## 2023-01-01 RX ADMIN — OLANZAPINE 5 MG: 5 TABLET, FILM COATED ORAL at 21:23

## 2023-01-01 RX ADMIN — ACETAMINOPHEN 975 MG: 325 TABLET ORAL at 22:22

## 2023-01-01 RX ADMIN — RIVAROXABAN 15 MG: 15 TABLET, FILM COATED ORAL at 21:37

## 2023-01-01 RX ADMIN — DOXYCYCLINE 100 MG: 100 CAPSULE ORAL at 08:42

## 2023-01-01 RX ADMIN — OXYCODONE HYDROCHLORIDE 10 MG: 100 SOLUTION ORAL at 19:47

## 2023-01-01 RX ADMIN — LOPERAMIDE HYDROCHLORIDE 2 MG: 2 CAPSULE ORAL at 11:41

## 2023-01-01 RX ADMIN — PIPERACILLIN AND TAZOBACTAM 3.38 G: 3; .375 INJECTION, POWDER, LYOPHILIZED, FOR SOLUTION INTRAVENOUS at 07:02

## 2023-01-01 RX ADMIN — SPIRONOLACTONE 25 MG: 25 TABLET ORAL at 07:58

## 2023-01-01 RX ADMIN — RIVAROXABAN 15 MG: 15 TABLET, FILM COATED ORAL at 23:07

## 2023-01-01 RX ADMIN — FLUDEOXYGLUCOSE F-18 10.16 MILLICURIE: 500 INJECTION, SOLUTION INTRAVENOUS at 08:15

## 2023-01-01 RX ADMIN — IPRATROPIUM BROMIDE AND ALBUTEROL SULFATE 3 ML: .5; 3 SOLUTION RESPIRATORY (INHALATION) at 20:09

## 2023-01-01 RX ADMIN — UMECLIDINIUM 1 PUFF: 62.5 AEROSOL, POWDER ORAL at 20:55

## 2023-01-01 RX ADMIN — OXYCODONE HYDROCHLORIDE 10 MG: 5 TABLET ORAL at 00:03

## 2023-01-01 RX ADMIN — TRAMADOL HYDROCHLORIDE 50 MG: 50 TABLET, COATED ORAL at 20:48

## 2023-01-01 RX ADMIN — PREDNISONE 10 MG: 10 TABLET ORAL at 09:11

## 2023-01-01 RX ADMIN — BENZONATATE 100 MG: 100 CAPSULE ORAL at 23:49

## 2023-01-01 RX ADMIN — PIPERACILLIN AND TAZOBACTAM 3.38 G: 3; .375 INJECTION, POWDER, LYOPHILIZED, FOR SOLUTION INTRAVENOUS at 02:21

## 2023-01-01 RX ADMIN — OLANZAPINE 5 MG: 5 TABLET, FILM COATED ORAL at 21:02

## 2023-01-01 RX ADMIN — ACETAMINOPHEN 650 MG: 325 TABLET ORAL at 22:04

## 2023-01-01 RX ADMIN — FUROSEMIDE 40 MG: 40 TABLET ORAL at 18:21

## 2023-01-01 RX ADMIN — IPRATROPIUM BROMIDE AND ALBUTEROL SULFATE 3 ML: .5; 3 SOLUTION RESPIRATORY (INHALATION) at 13:57

## 2023-01-01 RX ADMIN — PIPERACILLIN AND TAZOBACTAM 3.38 G: 3; .375 INJECTION, POWDER, LYOPHILIZED, FOR SOLUTION INTRAVENOUS at 16:30

## 2023-01-01 RX ADMIN — GABAPENTIN 300 MG: 300 CAPSULE ORAL at 20:51

## 2023-01-01 RX ADMIN — GABAPENTIN 300 MG: 300 CAPSULE ORAL at 21:00

## 2023-01-01 RX ADMIN — TOLTERODINE TARTRATE 2 MG: 2 CAPSULE, EXTENDED RELEASE ORAL at 08:13

## 2023-01-01 RX ADMIN — SIMVASTATIN 40 MG: 20 TABLET, FILM COATED ORAL at 21:48

## 2023-01-01 RX ADMIN — HYDROMORPHONE HYDROCHLORIDE 1 MG: 1 INJECTION, SOLUTION INTRAMUSCULAR; INTRAVENOUS; SUBCUTANEOUS at 09:43

## 2023-01-01 RX ADMIN — PREDNISONE 20 MG: 20 TABLET ORAL at 09:04

## 2023-01-01 RX ADMIN — SPIRONOLACTONE 25 MG: 25 TABLET, FILM COATED ORAL at 09:21

## 2023-01-01 RX ADMIN — BUMETANIDE 1 MG: 1 TABLET ORAL at 18:10

## 2023-01-01 RX ADMIN — METHYLPREDNISOLONE SODIUM SUCCINATE 125 MG: 125 INJECTION, POWDER, FOR SOLUTION INTRAMUSCULAR; INTRAVENOUS at 12:26

## 2023-01-01 RX ADMIN — PIPERACILLIN AND TAZOBACTAM 3.38 G: 3; .375 INJECTION, POWDER, LYOPHILIZED, FOR SOLUTION INTRAVENOUS at 03:00

## 2023-01-01 RX ADMIN — PIPERACILLIN AND TAZOBACTAM 3.38 G: 3; .375 INJECTION, POWDER, LYOPHILIZED, FOR SOLUTION INTRAVENOUS at 01:18

## 2023-01-01 RX ADMIN — HYDROXYZINE HYDROCHLORIDE 50 MG: 25 TABLET, FILM COATED ORAL at 13:53

## 2023-01-01 RX ADMIN — UMECLIDINIUM 1 PUFF: 62.5 AEROSOL, POWDER ORAL at 09:16

## 2023-01-01 RX ADMIN — LORAZEPAM 0.5 MG: 2 SOLUTION, CONCENTRATE ORAL at 09:30

## 2023-01-01 RX ADMIN — OLANZAPINE 5 MG: 5 TABLET, FILM COATED ORAL at 21:00

## 2023-01-01 RX ADMIN — Medication 1 SPRAY: at 00:21

## 2023-01-01 RX ADMIN — TRAMADOL HYDROCHLORIDE 50 MG: 50 TABLET, COATED ORAL at 21:23

## 2023-01-01 RX ADMIN — ACETAMINOPHEN 1000 MG: 500 TABLET ORAL at 00:36

## 2023-01-01 RX ADMIN — TRAMADOL HYDROCHLORIDE 50 MG: 50 TABLET, COATED ORAL at 10:40

## 2023-01-01 RX ADMIN — HYDROMORPHONE HYDROCHLORIDE 2 MG: 2 TABLET ORAL at 16:26

## 2023-01-01 RX ADMIN — IPRATROPIUM BROMIDE AND ALBUTEROL SULFATE 3 ML: .5; 3 SOLUTION RESPIRATORY (INHALATION) at 22:07

## 2023-01-01 RX ADMIN — OXYCODONE HYDROCHLORIDE 5 MG: 5 TABLET ORAL at 23:05

## 2023-01-01 RX ADMIN — PREDNISONE 40 MG: 20 TABLET ORAL at 08:01

## 2023-01-01 RX ADMIN — PIPERACILLIN AND TAZOBACTAM 3.38 G: 3; .375 INJECTION, POWDER, LYOPHILIZED, FOR SOLUTION INTRAVENOUS at 09:54

## 2023-01-01 RX ADMIN — METOPROLOL SUCCINATE 75 MG: 25 TABLET, EXTENDED RELEASE ORAL at 23:07

## 2023-01-01 RX ADMIN — ALBUTEROL SULFATE 2 PUFF: 90 AEROSOL, METERED RESPIRATORY (INHALATION) at 06:43

## 2023-01-01 RX ADMIN — Medication 10 MG: at 22:37

## 2023-01-01 RX ADMIN — PREDNISONE 40 MG: 20 TABLET ORAL at 08:59

## 2023-01-01 RX ADMIN — LORAZEPAM 0.5 MG: 2 SOLUTION, CONCENTRATE ORAL at 18:24

## 2023-01-01 RX ADMIN — BUMETANIDE 1 MG: 1 TABLET ORAL at 07:59

## 2023-01-01 RX ADMIN — PIPERACILLIN AND TAZOBACTAM 3.38 G: 3; .375 INJECTION, POWDER, LYOPHILIZED, FOR SOLUTION INTRAVENOUS at 00:57

## 2023-01-01 RX ADMIN — BUMETANIDE 1 MG: 1 TABLET ORAL at 17:51

## 2023-01-01 RX ADMIN — DIPHENOXYLATE HYDROCHLORIDE AND ATROPINE SULFATE 1 TABLET: 2.5; .025 TABLET ORAL at 11:27

## 2023-01-01 RX ADMIN — METHYLPREDNISOLONE SODIUM SUCCINATE 125 MG: 125 INJECTION, POWDER, FOR SOLUTION INTRAMUSCULAR; INTRAVENOUS at 17:04

## 2023-01-01 RX ADMIN — ALBUTEROL SULFATE 2 PUFF: 90 AEROSOL, METERED RESPIRATORY (INHALATION) at 12:30

## 2023-01-01 RX ADMIN — TAMOXIFEN CITRATE 20 MG: 10 TABLET ORAL at 16:30

## 2023-01-01 RX ADMIN — Medication 0.5 MG: at 12:31

## 2023-01-01 RX ADMIN — METOPROLOL SUCCINATE 75 MG: 25 TABLET, EXTENDED RELEASE ORAL at 21:37

## 2023-01-01 RX ADMIN — IPRATROPIUM BROMIDE AND ALBUTEROL SULFATE 3 ML: .5; 3 SOLUTION RESPIRATORY (INHALATION) at 08:01

## 2023-01-01 RX ADMIN — SPIRONOLACTONE 25 MG: 25 TABLET ORAL at 09:04

## 2023-01-01 RX ADMIN — HYDROMORPHONE HYDROCHLORIDE 0.5 MG: 1 INJECTION, SOLUTION INTRAMUSCULAR; INTRAVENOUS; SUBCUTANEOUS at 06:11

## 2023-01-01 RX ADMIN — TAMOXIFEN CITRATE 20 MG: 10 TABLET ORAL at 17:01

## 2023-01-01 RX ADMIN — METOPROLOL SUCCINATE 75 MG: 25 TABLET, EXTENDED RELEASE ORAL at 22:06

## 2023-01-01 RX ADMIN — METHYLPREDNISOLONE SODIUM SUCCINATE 81.25 MG: 125 INJECTION, POWDER, FOR SOLUTION INTRAMUSCULAR; INTRAVENOUS at 16:56

## 2023-01-01 RX ADMIN — TRAMADOL HYDROCHLORIDE 50 MG: 50 TABLET, COATED ORAL at 16:28

## 2023-01-01 RX ADMIN — HYDROMORPHONE HYDROCHLORIDE 0.5 MG: 1 INJECTION, SOLUTION INTRAMUSCULAR; INTRAVENOUS; SUBCUTANEOUS at 05:51

## 2023-01-01 RX ADMIN — SIMVASTATIN 40 MG: 20 TABLET, FILM COATED ORAL at 20:18

## 2023-01-01 RX ADMIN — FUROSEMIDE 40 MG: 40 TABLET ORAL at 08:00

## 2023-01-01 RX ADMIN — TAMOXIFEN CITRATE 20 MG: 10 TABLET ORAL at 08:47

## 2023-01-01 RX ADMIN — BUMETANIDE 1 MG: 1 TABLET ORAL at 09:07

## 2023-01-01 RX ADMIN — BUMETANIDE 1 MG: 0.25 INJECTION INTRAMUSCULAR; INTRAVENOUS at 21:24

## 2023-01-01 RX ADMIN — RIVAROXABAN 15 MG: 15 TABLET, FILM COATED ORAL at 22:26

## 2023-01-01 RX ADMIN — LORAZEPAM 0.25 MG: 2 SOLUTION, CONCENTRATE ORAL at 05:18

## 2023-01-01 RX ADMIN — BUMETANIDE 1 MG: 1 TABLET ORAL at 09:04

## 2023-01-01 RX ADMIN — MIRABEGRON 25 MG: 25 TABLET, FILM COATED, EXTENDED RELEASE ORAL at 09:12

## 2023-01-01 RX ADMIN — METOPROLOL SUCCINATE 50 MG: 50 TABLET, EXTENDED RELEASE ORAL at 21:50

## 2023-01-01 RX ADMIN — FLUTICASONE FUROATE AND VILANTEROL TRIFENATATE 1 PUFF: 200; 25 POWDER RESPIRATORY (INHALATION) at 07:43

## 2023-01-01 RX ADMIN — ACETAMINOPHEN 650 MG: 325 TABLET ORAL at 14:42

## 2023-01-01 RX ADMIN — MORPHINE SULFATE 10 MG: 100 SOLUTION ORAL at 10:44

## 2023-01-01 RX ADMIN — METOPROLOL SUCCINATE 50 MG: 50 TABLET, EXTENDED RELEASE ORAL at 21:39

## 2023-01-01 RX ADMIN — BUMETANIDE 1 MG: 1 TABLET ORAL at 08:01

## 2023-01-01 RX ADMIN — IPRATROPIUM BROMIDE AND ALBUTEROL SULFATE 3 ML: .5; 3 SOLUTION RESPIRATORY (INHALATION) at 03:59

## 2023-01-01 RX ADMIN — ESCITALOPRAM OXALATE 10 MG: 10 TABLET ORAL at 09:31

## 2023-01-01 RX ADMIN — POTASSIUM CHLORIDE 10 MEQ: 10 TABLET, EXTENDED RELEASE ORAL at 21:03

## 2023-01-01 RX ADMIN — FLUTICASONE FUROATE AND VILANTEROL TRIFENATATE 1 PUFF: 200; 25 POWDER RESPIRATORY (INHALATION) at 20:50

## 2023-01-01 RX ADMIN — BENZONATATE 100 MG: 100 CAPSULE ORAL at 09:47

## 2023-01-01 RX ADMIN — FUROSEMIDE 20 MG: 10 INJECTION, SOLUTION INTRAMUSCULAR; INTRAVENOUS at 08:55

## 2023-01-01 RX ADMIN — LORAZEPAM 1 MG: 2 INJECTION INTRAMUSCULAR; INTRAVENOUS at 14:25

## 2023-01-01 RX ADMIN — TOLTERODINE TARTRATE 2 MG: 2 CAPSULE, EXTENDED RELEASE ORAL at 08:31

## 2023-01-01 RX ADMIN — DOXYCYCLINE 100 MG: 100 INJECTION, POWDER, LYOPHILIZED, FOR SOLUTION INTRAVENOUS at 08:04

## 2023-01-01 RX ADMIN — ESCITALOPRAM OXALATE 10 MG: 10 TABLET ORAL at 08:12

## 2023-01-01 RX ADMIN — FAMOTIDINE 20 MG: 20 TABLET ORAL at 20:48

## 2023-01-01 RX ADMIN — ACETAMINOPHEN 975 MG: 325 TABLET ORAL at 09:09

## 2023-01-01 RX ADMIN — METOPROLOL SUCCINATE 75 MG: 50 TABLET, EXTENDED RELEASE ORAL at 21:42

## 2023-01-01 RX ADMIN — SPIRONOLACTONE 25 MG: 25 TABLET, FILM COATED ORAL at 08:31

## 2023-01-01 RX ADMIN — ALBUTEROL SULFATE 2 PUFF: 90 AEROSOL, METERED RESPIRATORY (INHALATION) at 09:23

## 2023-01-01 RX ADMIN — TRAMADOL HYDROCHLORIDE 50 MG: 50 TABLET, COATED ORAL at 10:13

## 2023-01-01 RX ADMIN — CEFTRIAXONE 1 G: 1 INJECTION, POWDER, FOR SOLUTION INTRAMUSCULAR; INTRAVENOUS at 13:51

## 2023-01-01 RX ADMIN — GABAPENTIN 600 MG: 600 TABLET, FILM COATED ORAL at 21:24

## 2023-01-01 RX ADMIN — FUROSEMIDE 80 MG: 10 INJECTION, SOLUTION INTRAMUSCULAR; INTRAVENOUS at 17:51

## 2023-01-01 RX ADMIN — AMOXICILLIN AND CLAVULANATE POTASSIUM 1 TABLET: 500; 125 TABLET, FILM COATED ORAL at 09:10

## 2023-01-01 RX ADMIN — POTASSIUM CHLORIDE 20 MEQ: 1500 TABLET, EXTENDED RELEASE ORAL at 08:47

## 2023-01-01 RX ADMIN — ACETYLCYSTEINE 4 ML: 200 SOLUTION ORAL; RESPIRATORY (INHALATION) at 08:01

## 2023-01-01 RX ADMIN — FAMOTIDINE 20 MG: 20 TABLET ORAL at 21:49

## 2023-01-01 RX ADMIN — SIMVASTATIN 40 MG: 20 TABLET, FILM COATED ORAL at 21:03

## 2023-01-01 RX ADMIN — LORAZEPAM 0.5 MG: 2 INJECTION INTRAMUSCULAR; INTRAVENOUS at 12:31

## 2023-01-01 RX ADMIN — SENNOSIDES AND DOCUSATE SODIUM 1 TABLET: 50; 8.6 TABLET ORAL at 09:04

## 2023-01-01 RX ADMIN — TOLTERODINE TARTRATE 2 MG: 2 CAPSULE, EXTENDED RELEASE ORAL at 09:10

## 2023-01-01 RX ADMIN — ACETAMINOPHEN 1000 MG: 500 TABLET ORAL at 00:58

## 2023-01-01 RX ADMIN — FUROSEMIDE 80 MG: 40 TABLET ORAL at 17:59

## 2023-01-01 RX ADMIN — TRAMADOL HYDROCHLORIDE 50 MG: 50 TABLET, COATED ORAL at 05:00

## 2023-01-01 RX ADMIN — MIRABEGRON 25 MG: 25 TABLET, FILM COATED, EXTENDED RELEASE ORAL at 10:12

## 2023-01-01 RX ADMIN — SIMVASTATIN 40 MG: 20 TABLET, FILM COATED ORAL at 22:23

## 2023-01-01 RX ADMIN — RIVAROXABAN 15 MG: 15 TABLET, FILM COATED ORAL at 22:37

## 2023-01-01 RX ADMIN — MIRABEGRON 25 MG: 25 TABLET, FILM COATED, EXTENDED RELEASE ORAL at 09:03

## 2023-01-01 RX ADMIN — IPRATROPIUM BROMIDE AND ALBUTEROL SULFATE 3 ML: .5; 3 SOLUTION RESPIRATORY (INHALATION) at 01:11

## 2023-01-01 RX ADMIN — HYDROXYZINE HYDROCHLORIDE 25 MG: 25 TABLET, FILM COATED ORAL at 14:02

## 2023-01-01 RX ADMIN — AMOXICILLIN AND CLAVULANATE POTASSIUM 1 TABLET: 875; 125 TABLET, FILM COATED ORAL at 21:01

## 2023-01-01 RX ADMIN — ACETAMINOPHEN 975 MG: 325 TABLET ORAL at 14:05

## 2023-01-01 RX ADMIN — SIMVASTATIN 40 MG: 40 TABLET, FILM COATED ORAL at 20:48

## 2023-01-01 RX ADMIN — PREDNISONE 20 MG: 20 TABLET ORAL at 08:45

## 2023-01-01 RX ADMIN — FENTANYL CITRATE 25 MCG: 50 INJECTION, SOLUTION INTRAMUSCULAR; INTRAVENOUS at 23:52

## 2023-01-01 RX ADMIN — TAMOXIFEN CITRATE 20 MG: 10 TABLET ORAL at 08:40

## 2023-01-01 RX ADMIN — PREDNISONE 40 MG: 20 TABLET ORAL at 08:06

## 2023-01-01 RX ADMIN — MIRABEGRON 25 MG: 25 TABLET, FILM COATED, EXTENDED RELEASE ORAL at 13:59

## 2023-01-01 RX ADMIN — BUMETANIDE 1 MG: 1 TABLET ORAL at 19:01

## 2023-01-01 RX ADMIN — VANCOMYCIN HYDROCHLORIDE 500 MG: 500 INJECTION, POWDER, LYOPHILIZED, FOR SOLUTION INTRAVENOUS at 14:22

## 2023-01-01 RX ADMIN — Medication 1 MG: at 21:25

## 2023-01-01 RX ADMIN — LORAZEPAM 1 MG: 2 INJECTION INTRAMUSCULAR; INTRAVENOUS at 21:58

## 2023-01-01 RX ADMIN — UMECLIDINIUM 1 PUFF: 62.5 AEROSOL, POWDER ORAL at 22:23

## 2023-01-01 RX ADMIN — BUMETANIDE 1 MG: 1 TABLET ORAL at 08:40

## 2023-01-01 RX ADMIN — OXYCODONE HYDROCHLORIDE 10 MG: 100 SOLUTION ORAL at 15:02

## 2023-01-01 RX ADMIN — MAGNESIUM SULFATE HEPTAHYDRATE 2 G: 40 INJECTION, SOLUTION INTRAVENOUS at 17:04

## 2023-01-01 RX ADMIN — IPRATROPIUM BROMIDE AND ALBUTEROL SULFATE 3 ML: .5; 3 SOLUTION RESPIRATORY (INHALATION) at 00:02

## 2023-01-01 RX ADMIN — HYDROMORPHONE HYDROCHLORIDE 1 MG: 1 INJECTION, SOLUTION INTRAMUSCULAR; INTRAVENOUS; SUBCUTANEOUS at 18:31

## 2023-01-01 RX ADMIN — RIVAROXABAN 15 MG: 15 TABLET, FILM COATED ORAL at 22:14

## 2023-01-01 RX ADMIN — ONDANSETRON 4 MG: 4 TABLET, FILM COATED ORAL at 13:16

## 2023-01-01 RX ADMIN — MIRABEGRON 25 MG: 25 TABLET, FILM COATED, EXTENDED RELEASE ORAL at 08:52

## 2023-01-01 RX ADMIN — FUROSEMIDE 80 MG: 10 INJECTION, SOLUTION INTRAMUSCULAR; INTRAVENOUS at 23:51

## 2023-01-01 RX ADMIN — BUMETANIDE 1 MG: 1 TABLET ORAL at 08:48

## 2023-01-01 RX ADMIN — ESCITALOPRAM OXALATE 10 MG: 10 TABLET ORAL at 09:30

## 2023-01-01 RX ADMIN — PIPERACILLIN AND TAZOBACTAM 3.38 G: 3; .375 INJECTION, POWDER, LYOPHILIZED, FOR SOLUTION INTRAVENOUS at 18:14

## 2023-01-01 RX ADMIN — MIRABEGRON 25 MG: 25 TABLET, FILM COATED, EXTENDED RELEASE ORAL at 08:01

## 2023-01-01 RX ADMIN — MIRABEGRON 25 MG: 25 TABLET, FILM COATED, EXTENDED RELEASE ORAL at 09:44

## 2023-01-01 RX ADMIN — FUROSEMIDE 80 MG: 10 INJECTION, SOLUTION INTRAMUSCULAR; INTRAVENOUS at 00:44

## 2023-01-01 RX ADMIN — FLUTICASONE FUROATE AND VILANTEROL TRIFENATATE 1 PUFF: 200; 25 POWDER RESPIRATORY (INHALATION) at 12:21

## 2023-01-01 RX ADMIN — FUROSEMIDE 20 MG: 10 INJECTION, SOLUTION INTRAMUSCULAR; INTRAVENOUS at 17:11

## 2023-01-01 RX ADMIN — ACETAMINOPHEN 975 MG: 325 TABLET ORAL at 20:30

## 2023-01-01 RX ADMIN — RIVAROXABAN 15 MG: 15 TABLET, FILM COATED ORAL at 23:49

## 2023-01-01 RX ADMIN — HYDROXYZINE HYDROCHLORIDE 25 MG: 25 TABLET, FILM COATED ORAL at 05:02

## 2023-01-01 RX ADMIN — SENNOSIDES AND DOCUSATE SODIUM 1 TABLET: 50; 8.6 TABLET ORAL at 21:00

## 2023-01-01 RX ADMIN — ANORECTAL OINTMENT: 15.7; .44; 24; 20.6 OINTMENT TOPICAL at 21:03

## 2023-01-01 RX ADMIN — MORPHINE SULFATE 5 MG: 100 SOLUTION ORAL at 12:32

## 2023-01-01 RX ADMIN — MIRABEGRON 25 MG: 25 TABLET, FILM COATED, EXTENDED RELEASE ORAL at 12:19

## 2023-01-01 RX ADMIN — IPRATROPIUM BROMIDE AND ALBUTEROL SULFATE 3 ML: .5; 3 SOLUTION RESPIRATORY (INHALATION) at 02:00

## 2023-01-01 RX ADMIN — IOPAMIDOL 75 ML: 755 INJECTION, SOLUTION INTRAVENOUS at 14:38

## 2023-01-01 RX ADMIN — BUMETANIDE 1 MG: 1 TABLET ORAL at 19:03

## 2023-01-01 RX ADMIN — MIRABEGRON 25 MG: 25 TABLET, FILM COATED, EXTENDED RELEASE ORAL at 08:00

## 2023-01-01 RX ADMIN — UMECLIDINIUM 1 PUFF: 62.5 AEROSOL, POWDER ORAL at 09:23

## 2023-01-01 RX ADMIN — UMECLIDINIUM 1 PUFF: 62.5 AEROSOL, POWDER ORAL at 21:22

## 2023-01-01 RX ADMIN — ALBUTEROL SULFATE 2 PUFF: 90 AEROSOL, METERED RESPIRATORY (INHALATION) at 20:47

## 2023-01-01 RX ADMIN — BUMETANIDE 1 MG: 1 TABLET ORAL at 17:01

## 2023-01-01 RX ADMIN — BENZONATATE 100 MG: 100 CAPSULE ORAL at 19:35

## 2023-01-01 RX ADMIN — ACETAMINOPHEN 650 MG: 325 TABLET ORAL at 11:48

## 2023-01-01 RX ADMIN — FUROSEMIDE 40 MG: 40 TABLET ORAL at 11:59

## 2023-01-01 RX ADMIN — GABAPENTIN 600 MG: 600 TABLET, FILM COATED ORAL at 20:54

## 2023-01-01 RX ADMIN — GABAPENTIN 600 MG: 600 TABLET, FILM COATED ORAL at 21:42

## 2023-01-01 RX ADMIN — METOPROLOL SUCCINATE 50 MG: 50 TABLET, EXTENDED RELEASE ORAL at 21:21

## 2023-01-01 RX ADMIN — ACETYLCYSTEINE 4 ML: 200 SOLUTION ORAL; RESPIRATORY (INHALATION) at 19:58

## 2023-01-01 RX ADMIN — METOPROLOL SUCCINATE 50 MG: 50 TABLET, EXTENDED RELEASE ORAL at 20:51

## 2023-01-01 RX ADMIN — FLUTICASONE FUROATE AND VILANTEROL TRIFENATATE 1 PUFF: 200; 25 POWDER RESPIRATORY (INHALATION) at 09:54

## 2023-01-01 RX ADMIN — MIRABEGRON 25 MG: 25 TABLET, FILM COATED, EXTENDED RELEASE ORAL at 08:37

## 2023-01-01 RX ADMIN — LIDOCAINE HYDROCHLORIDE 1.5 ML: 10 INJECTION, SOLUTION EPIDURAL; INFILTRATION; INTRACAUDAL; PERINEURAL at 20:25

## 2023-01-01 RX ADMIN — KETOROLAC TROMETHAMINE 15 MG: 15 INJECTION INTRAMUSCULAR; INTRAVENOUS at 06:16

## 2023-01-01 RX ADMIN — DIMENHYDRINATE 25 MG: 50 INJECTION, SOLUTION INTRAMUSCULAR; INTRAVENOUS at 00:06

## 2023-01-01 RX ADMIN — OXYCODONE HYDROCHLORIDE 10 MG: 5 TABLET ORAL at 03:15

## 2023-01-01 RX ADMIN — DIATRIZOATE MEGLUMINE AND DIATRIZOATE SODIUM 120 ML: 660; 100 SOLUTION ORAL; RECTAL at 09:48

## 2023-01-01 RX ADMIN — UMECLIDINIUM 1 PUFF: 62.5 AEROSOL, POWDER ORAL at 09:13

## 2023-01-01 RX ADMIN — MIRABEGRON 25 MG: 25 TABLET, FILM COATED, EXTENDED RELEASE ORAL at 15:58

## 2023-01-01 RX ADMIN — PROCHLORPERAZINE EDISYLATE 5 MG: 5 INJECTION INTRAMUSCULAR; INTRAVENOUS at 17:05

## 2023-01-01 RX ADMIN — FLUTICASONE FUROATE AND VILANTEROL TRIFENATATE 1 PUFF: 200; 25 POWDER RESPIRATORY (INHALATION) at 08:45

## 2023-01-01 RX ADMIN — FUROSEMIDE 40 MG: 10 INJECTION, SOLUTION INTRAMUSCULAR; INTRAVENOUS at 01:05

## 2023-01-01 RX ADMIN — HYDROXYZINE HYDROCHLORIDE 25 MG: 25 TABLET, FILM COATED ORAL at 10:12

## 2023-01-01 RX ADMIN — UMECLIDINIUM 1 PUFF: 62.5 AEROSOL, POWDER ORAL at 22:33

## 2023-01-01 RX ADMIN — FLUTICASONE FUROATE AND VILANTEROL TRIFENATATE 1 PUFF: 200; 25 POWDER RESPIRATORY (INHALATION) at 08:44

## 2023-01-01 RX ADMIN — TOLTERODINE TARTRATE 2 MG: 2 CAPSULE, EXTENDED RELEASE ORAL at 08:47

## 2023-01-01 RX ADMIN — METOPROLOL SUCCINATE 75 MG: 25 TABLET, EXTENDED RELEASE ORAL at 21:52

## 2023-01-01 RX ADMIN — SIMVASTATIN 40 MG: 20 TABLET, FILM COATED ORAL at 22:07

## 2023-01-01 RX ADMIN — DOXYCYCLINE 100 MG: 100 CAPSULE ORAL at 20:18

## 2023-01-01 RX ADMIN — HYDROMORPHONE HYDROCHLORIDE 0.2 MG: 0.2 INJECTION, SOLUTION INTRAMUSCULAR; INTRAVENOUS; SUBCUTANEOUS at 16:57

## 2023-01-01 RX ADMIN — BISMUTH SUBSALICYLATE 15 ML: 525 LIQUID ORAL at 18:46

## 2023-01-01 RX ADMIN — LOPERAMIDE HYDROCHLORIDE 2 MG: 2 CAPSULE ORAL at 10:20

## 2023-01-01 RX ADMIN — Medication 1 SPRAY: at 07:57

## 2023-01-01 RX ADMIN — HYDROMORPHONE HYDROCHLORIDE 1 MG: 2 TABLET ORAL at 03:52

## 2023-01-01 RX ADMIN — BENZONATATE 100 MG: 100 CAPSULE ORAL at 20:36

## 2023-01-01 RX ADMIN — TAMOXIFEN CITRATE 20 MG: 10 TABLET ORAL at 08:12

## 2023-01-01 RX ADMIN — RIVAROXABAN 15 MG: 15 TABLET, FILM COATED ORAL at 21:00

## 2023-01-01 RX ADMIN — TAMOXIFEN CITRATE 20 MG: 10 TABLET ORAL at 08:48

## 2023-01-01 RX ADMIN — UMECLIDINIUM 1 PUFF: 62.5 AEROSOL, POWDER ORAL at 09:54

## 2023-01-01 RX ADMIN — GABAPENTIN 300 MG: 300 CAPSULE ORAL at 20:18

## 2023-01-01 RX ADMIN — SPIRONOLACTONE 12.5 MG: 25 TABLET ORAL at 20:18

## 2023-01-01 RX ADMIN — RIVAROXABAN 15 MG: 15 TABLET, FILM COATED ORAL at 21:02

## 2023-01-01 RX ADMIN — ACETYLCYSTEINE 4 ML: 100 INHALANT RESPIRATORY (INHALATION) at 20:15

## 2023-01-01 RX ADMIN — ACETAMINOPHEN 650 MG: 325 TABLET ORAL at 05:24

## 2023-01-01 RX ADMIN — ACETAMINOPHEN 975 MG: 325 TABLET ORAL at 14:36

## 2023-01-01 RX ADMIN — METHYLPREDNISOLONE SODIUM SUCCINATE 16 MG: 40 INJECTION, POWDER, FOR SOLUTION INTRAMUSCULAR; INTRAVENOUS at 07:45

## 2023-01-01 RX ADMIN — SPIRONOLACTONE 12.5 MG: 25 TABLET ORAL at 12:09

## 2023-01-01 RX ADMIN — POTASSIUM CHLORIDE 20 MEQ: 1500 TABLET, EXTENDED RELEASE ORAL at 11:05

## 2023-01-01 RX ADMIN — PIPERACILLIN AND TAZOBACTAM 3.38 G: 3; .375 INJECTION, POWDER, LYOPHILIZED, FOR SOLUTION INTRAVENOUS at 17:19

## 2023-01-01 RX ADMIN — RIVAROXABAN 15 MG: 15 TABLET, FILM COATED ORAL at 21:39

## 2023-01-01 RX ADMIN — HYDROMORPHONE HYDROCHLORIDE 2 MG: 2 TABLET ORAL at 09:09

## 2023-01-01 RX ADMIN — DOXYCYCLINE 100 MG: 100 CAPSULE ORAL at 08:46

## 2023-01-01 RX ADMIN — SODIUM CHLORIDE: 9 INJECTION, SOLUTION INTRAVENOUS at 13:56

## 2023-01-01 RX ADMIN — AMOXICILLIN AND CLAVULANATE POTASSIUM 1 TABLET: 875; 125 TABLET, FILM COATED ORAL at 07:42

## 2023-01-01 RX ADMIN — MORPHINE SULFATE 10 MG: 10 INJECTION INTRAVENOUS at 14:25

## 2023-01-01 RX ADMIN — IPRATROPIUM BROMIDE AND ALBUTEROL SULFATE 3 ML: .5; 3 SOLUTION RESPIRATORY (INHALATION) at 07:46

## 2023-01-01 RX ADMIN — TOLTERODINE TARTRATE 2 MG: 2 CAPSULE, EXTENDED RELEASE ORAL at 09:02

## 2023-01-01 RX ADMIN — UMECLIDINIUM 1 PUFF: 62.5 AEROSOL, POWDER ORAL at 09:11

## 2023-01-01 RX ADMIN — TOPICAL ANESTHETIC 0.5 ML: 200 SPRAY DENTAL; PERIODONTAL at 12:25

## 2023-01-01 RX ADMIN — Medication 1 MG: at 22:06

## 2023-01-01 RX ADMIN — LORAZEPAM 0.5 MG: 2 INJECTION INTRAMUSCULAR; INTRAVENOUS at 18:26

## 2023-01-01 RX ADMIN — HYDROMORPHONE HYDROCHLORIDE 0.2 MG: 0.2 INJECTION, SOLUTION INTRAMUSCULAR; INTRAVENOUS; SUBCUTANEOUS at 15:05

## 2023-01-01 RX ADMIN — DIPHENOXYLATE HYDROCHLORIDE AND ATROPINE SULFATE 1 TABLET: 2.5; .025 TABLET ORAL at 10:35

## 2023-01-01 RX ADMIN — FUROSEMIDE 20 MG: 20 TABLET ORAL at 08:52

## 2023-01-01 RX ADMIN — FAMOTIDINE 20 MG: 20 TABLET ORAL at 15:57

## 2023-01-01 RX ADMIN — CEFTRIAXONE 1 G: 1 INJECTION, POWDER, FOR SOLUTION INTRAMUSCULAR; INTRAVENOUS at 22:40

## 2023-01-01 RX ADMIN — RIVAROXABAN 15 MG: 15 TABLET, FILM COATED ORAL at 22:44

## 2023-01-01 RX ADMIN — METHYLPREDNISOLONE SODIUM SUCCINATE 16 MG: 40 INJECTION, POWDER, FOR SOLUTION INTRAMUSCULAR; INTRAVENOUS at 07:42

## 2023-01-01 RX ADMIN — HYDROMORPHONE HYDROCHLORIDE 0.5 MG: 1 INJECTION, SOLUTION INTRAMUSCULAR; INTRAVENOUS; SUBCUTANEOUS at 02:21

## 2023-01-01 RX ADMIN — HYDROMORPHONE HYDROCHLORIDE 0.2 MG: 0.2 INJECTION, SOLUTION INTRAMUSCULAR; INTRAVENOUS; SUBCUTANEOUS at 14:54

## 2023-01-01 RX ADMIN — POTASSIUM CHLORIDE 40 MEQ: 1500 TABLET, EXTENDED RELEASE ORAL at 12:35

## 2023-01-01 RX ADMIN — ACETYLCYSTEINE 4 ML: 100 INHALANT RESPIRATORY (INHALATION) at 21:37

## 2023-01-01 RX ADMIN — SODIUM CHLORIDE 250 ML: 9 INJECTION, SOLUTION INTRAVENOUS at 12:25

## 2023-01-01 RX ADMIN — FAMOTIDINE 20 MG: 20 TABLET ORAL at 20:18

## 2023-01-01 RX ADMIN — VANCOMYCIN HYDROCHLORIDE 500 MG: 500 INJECTION, POWDER, LYOPHILIZED, FOR SOLUTION INTRAVENOUS at 21:39

## 2023-01-01 RX ADMIN — PREDNISONE 40 MG: 20 TABLET ORAL at 08:30

## 2023-01-01 RX ADMIN — MIRABEGRON 25 MG: 25 TABLET, FILM COATED, EXTENDED RELEASE ORAL at 10:10

## 2023-01-01 RX ADMIN — OLANZAPINE 5 MG: 5 TABLET, FILM COATED ORAL at 21:48

## 2023-01-01 RX ADMIN — TRAMADOL HYDROCHLORIDE 50 MG: 50 TABLET, COATED ORAL at 16:56

## 2023-01-01 RX ADMIN — ACETAMINOPHEN 975 MG: 325 TABLET ORAL at 09:03

## 2023-01-01 RX ADMIN — UMECLIDINIUM 1 PUFF: 62.5 AEROSOL, POWDER ORAL at 21:40

## 2023-01-01 RX ADMIN — GABAPENTIN 600 MG: 600 TABLET, FILM COATED ORAL at 20:53

## 2023-01-01 RX ADMIN — TAMOXIFEN CITRATE 20 MG: 10 TABLET ORAL at 08:06

## 2023-01-01 RX ADMIN — POTASSIUM CHLORIDE 10 MEQ: 10 TABLET, EXTENDED RELEASE ORAL at 08:41

## 2023-01-01 RX ADMIN — GABAPENTIN 300 MG: 300 CAPSULE ORAL at 20:31

## 2023-01-01 RX ADMIN — FUROSEMIDE 20 MG: 10 INJECTION, SOLUTION INTRAMUSCULAR; INTRAVENOUS at 16:20

## 2023-01-01 RX ADMIN — TAMOXIFEN CITRATE 20 MG: 10 TABLET ORAL at 15:58

## 2023-01-01 RX ADMIN — TAMOXIFEN CITRATE 20 MG: 10 TABLET ORAL at 15:36

## 2023-01-01 RX ADMIN — Medication 1 MG: at 01:09

## 2023-01-01 RX ADMIN — RIVAROXABAN 15 MG: 15 TABLET, FILM COATED ORAL at 20:21

## 2023-01-01 RX ADMIN — Medication 1 MG: at 22:22

## 2023-01-01 RX ADMIN — SPIRONOLACTONE 25 MG: 25 TABLET ORAL at 08:01

## 2023-01-01 RX ADMIN — OXYCODONE HYDROCHLORIDE 10 MG: 100 SOLUTION ORAL at 17:28

## 2023-01-01 RX ADMIN — GABAPENTIN 600 MG: 600 TABLET, FILM COATED ORAL at 22:37

## 2023-01-01 RX ADMIN — UMECLIDINIUM 1 PUFF: 62.5 AEROSOL, POWDER ORAL at 21:24

## 2023-01-01 RX ADMIN — FUROSEMIDE 40 MG: 20 TABLET ORAL at 22:24

## 2023-01-01 RX ADMIN — MIRABEGRON 25 MG: 25 TABLET, FILM COATED, EXTENDED RELEASE ORAL at 08:12

## 2023-01-01 RX ADMIN — POTASSIUM CHLORIDE 20 MEQ: 1500 TABLET, EXTENDED RELEASE ORAL at 16:29

## 2023-01-01 RX ADMIN — SODIUM CHLORIDE 500 ML: 9 INJECTION, SOLUTION INTRAVENOUS at 17:30

## 2023-01-01 RX ADMIN — BUMETANIDE 1 MG: 1 TABLET ORAL at 17:02

## 2023-01-01 RX ADMIN — METHYLPREDNISOLONE SODIUM SUCCINATE 125 MG: 125 INJECTION, POWDER, FOR SOLUTION INTRAMUSCULAR; INTRAVENOUS at 17:57

## 2023-01-01 RX ADMIN — ALBUTEROL SULFATE 2.5 MG: 2.5 SOLUTION RESPIRATORY (INHALATION) at 00:32

## 2023-01-01 RX ADMIN — ACETAMINOPHEN 650 MG: 325 TABLET ORAL at 19:43

## 2023-01-01 RX ADMIN — AMOXICILLIN AND CLAVULANATE POTASSIUM 1 TABLET: 500; 125 TABLET, FILM COATED ORAL at 20:51

## 2023-01-01 RX ADMIN — POTASSIUM CHLORIDE 20 MEQ: 1500 TABLET, EXTENDED RELEASE ORAL at 19:03

## 2023-01-01 RX ADMIN — DOXYCYCLINE 100 MG: 100 CAPSULE ORAL at 07:58

## 2023-01-01 RX ADMIN — AZELASTINE 1 SPRAY: 1 SPRAY, METERED NASAL at 07:54

## 2023-01-01 RX ADMIN — ATROPINE SULFATE 2 DROP: 10 SOLUTION/ DROPS OPHTHALMIC at 11:15

## 2023-01-01 RX ADMIN — HYDROMORPHONE HYDROCHLORIDE 2 MG: 2 TABLET ORAL at 11:41

## 2023-01-01 RX ADMIN — HYDROMORPHONE HYDROCHLORIDE 0.2 MG: 0.2 INJECTION, SOLUTION INTRAMUSCULAR; INTRAVENOUS; SUBCUTANEOUS at 14:15

## 2023-01-01 RX ADMIN — MIRABEGRON 25 MG: 25 TABLET, FILM COATED, EXTENDED RELEASE ORAL at 08:47

## 2023-01-01 RX ADMIN — FUROSEMIDE 80 MG: 10 INJECTION, SOLUTION INTRAMUSCULAR; INTRAVENOUS at 09:20

## 2023-01-01 RX ADMIN — ACETYLCYSTEINE 4 ML: 100 INHALANT RESPIRATORY (INHALATION) at 16:06

## 2023-01-01 RX ADMIN — OXYCODONE HYDROCHLORIDE 10 MG: 100 SOLUTION ORAL at 16:31

## 2023-01-01 RX ADMIN — FLUTICASONE FUROATE AND VILANTEROL TRIFENATATE 1 PUFF: 200; 25 POWDER RESPIRATORY (INHALATION) at 20:55

## 2023-01-01 RX ADMIN — FAMOTIDINE 20 MG: 20 TABLET ORAL at 21:25

## 2023-01-01 RX ADMIN — HYDROMORPHONE HYDROCHLORIDE 0.2 MG: 0.2 INJECTION, SOLUTION INTRAMUSCULAR; INTRAVENOUS; SUBCUTANEOUS at 22:12

## 2023-01-01 RX ADMIN — KIT FOR THE PREPARATION OF TECHNETIUM TC 99M ALBUMIN AGGREGATED 8.52 MILLICURIE: 2.5 INJECTION, POWDER, FOR SOLUTION INTRAVENOUS at 10:39

## 2023-01-01 RX ADMIN — RIVAROXABAN 15 MG: 15 TABLET, FILM COATED ORAL at 22:28

## 2023-01-01 RX ADMIN — LORAZEPAM 0.5 MG: 2 INJECTION INTRAMUSCULAR; INTRAVENOUS at 20:10

## 2023-01-01 RX ADMIN — TRAMADOL HYDROCHLORIDE 50 MG: 50 TABLET, COATED ORAL at 16:29

## 2023-01-01 RX ADMIN — LORAZEPAM 0.5 MG: 2 INJECTION INTRAMUSCULAR; INTRAVENOUS at 07:44

## 2023-01-01 RX ADMIN — TRAMADOL HYDROCHLORIDE 50 MG: 50 TABLET, COATED ORAL at 21:47

## 2023-01-01 RX ADMIN — FUROSEMIDE 40 MG: 40 TABLET ORAL at 09:10

## 2023-01-01 RX ADMIN — ONDANSETRON 4 MG: 2 INJECTION INTRAMUSCULAR; INTRAVENOUS at 14:13

## 2023-01-01 RX ADMIN — TRAMADOL HYDROCHLORIDE 50 MG: 50 TABLET, COATED ORAL at 05:46

## 2023-01-01 RX ADMIN — PREDNISONE 20 MG: 20 TABLET ORAL at 08:48

## 2023-01-01 RX ADMIN — LORAZEPAM 0.5 MG: 0.5 TABLET ORAL at 17:23

## 2023-01-01 RX ADMIN — ACETAMINOPHEN 650 MG: 325 TABLET ORAL at 06:16

## 2023-01-01 RX ADMIN — MICONAZOLE NITRATE: 2 POWDER TOPICAL at 10:13

## 2023-01-01 RX ADMIN — TAMOXIFEN CITRATE 20 MG: 10 TABLET ORAL at 09:21

## 2023-01-01 RX ADMIN — LORAZEPAM 0.5 MG: 2 SOLUTION, CONCENTRATE ORAL at 19:46

## 2023-01-01 RX ADMIN — CALCIUM CARBONATE (ANTACID) CHEW TAB 500 MG 1000 MG: 500 CHEW TAB at 23:01

## 2023-01-01 RX ADMIN — HYDRALAZINE HYDROCHLORIDE 10 MG: 20 INJECTION, SOLUTION INTRAMUSCULAR; INTRAVENOUS at 17:13

## 2023-01-01 RX ADMIN — HYDROMORPHONE HYDROCHLORIDE 0.3 MG: 1 INJECTION, SOLUTION INTRAMUSCULAR; INTRAVENOUS; SUBCUTANEOUS at 09:05

## 2023-01-01 RX ADMIN — ACETAMINOPHEN 650 MG: 325 TABLET ORAL at 23:11

## 2023-01-01 RX ADMIN — TOLTERODINE TARTRATE 2 MG: 2 CAPSULE, EXTENDED RELEASE ORAL at 08:58

## 2023-01-01 RX ADMIN — BUMETANIDE 1 MG: 1 TABLET ORAL at 09:11

## 2023-01-01 RX ADMIN — HYDROMORPHONE HYDROCHLORIDE 1 MG: 1 INJECTION, SOLUTION INTRAMUSCULAR; INTRAVENOUS; SUBCUTANEOUS at 16:33

## 2023-01-01 RX ADMIN — ACETYLCYSTEINE 4 ML: 200 SOLUTION ORAL; RESPIRATORY (INHALATION) at 08:27

## 2023-01-01 RX ADMIN — UMECLIDINIUM 1 PUFF: 62.5 AEROSOL, POWDER ORAL at 08:07

## 2023-01-01 RX ADMIN — PIPERACILLIN AND TAZOBACTAM 3.38 G: 3; .375 INJECTION, POWDER, LYOPHILIZED, FOR SOLUTION INTRAVENOUS at 22:59

## 2023-01-01 RX ADMIN — TAMOXIFEN CITRATE 20 MG: 10 TABLET ORAL at 08:31

## 2023-01-01 RX ADMIN — TAMOXIFEN CITRATE 20 MG: 10 TABLET ORAL at 12:09

## 2023-01-01 RX ADMIN — ACETAMINOPHEN 975 MG: 325 TABLET ORAL at 08:48

## 2023-01-01 RX ADMIN — PREDNISONE 40 MG: 20 TABLET ORAL at 08:04

## 2023-01-01 RX ADMIN — MIRABEGRON 25 MG: 25 TABLET, FILM COATED, EXTENDED RELEASE ORAL at 08:30

## 2023-01-01 RX ADMIN — OLANZAPINE 5 MG: 10 INJECTION, POWDER, FOR SOLUTION INTRAMUSCULAR at 21:15

## 2023-01-01 RX ADMIN — GADOBUTROL 4.5 ML: 604.72 INJECTION INTRAVENOUS at 22:42

## 2023-01-01 RX ADMIN — LORAZEPAM 0.5 MG: 2 INJECTION INTRAMUSCULAR; INTRAVENOUS at 01:36

## 2023-01-01 RX ADMIN — OLANZAPINE 5 MG: 5 TABLET, FILM COATED ORAL at 21:39

## 2023-01-01 RX ADMIN — SODIUM CHLORIDE 500 ML: 9 INJECTION, SOLUTION INTRAVENOUS at 14:12

## 2023-01-01 RX ADMIN — RIVAROXABAN 15 MG: 15 TABLET, FILM COATED ORAL at 20:54

## 2023-01-01 RX ADMIN — GABAPENTIN 600 MG: 600 TABLET, FILM COATED ORAL at 20:46

## 2023-01-01 RX ADMIN — SPIRONOLACTONE 25 MG: 25 TABLET ORAL at 09:30

## 2023-01-01 RX ADMIN — Medication 1 MG: at 22:45

## 2023-01-01 RX ADMIN — ALBUTEROL SULFATE 5 MG: 2.5 SOLUTION RESPIRATORY (INHALATION) at 16:50

## 2023-01-01 RX ADMIN — ACETAMINOPHEN 650 MG: 325 TABLET ORAL at 03:16

## 2023-01-01 RX ADMIN — IPRATROPIUM BROMIDE AND ALBUTEROL SULFATE 3 ML: .5; 3 SOLUTION RESPIRATORY (INHALATION) at 07:51

## 2023-01-01 RX ADMIN — MIRABEGRON 25 MG: 25 TABLET, FILM COATED, EXTENDED RELEASE ORAL at 08:06

## 2023-01-01 RX ADMIN — OXYCODONE HYDROCHLORIDE 10 MG: 100 SOLUTION ORAL at 08:25

## 2023-01-01 RX ADMIN — SPIRONOLACTONE 25 MG: 25 TABLET ORAL at 08:44

## 2023-01-01 RX ADMIN — TRAMADOL HYDROCHLORIDE 25 MG: 50 TABLET, COATED ORAL at 22:44

## 2023-01-01 RX ADMIN — MIRABEGRON 25 MG: 25 TABLET, FILM COATED, EXTENDED RELEASE ORAL at 09:08

## 2023-01-01 RX ADMIN — MIRABEGRON 25 MG: 25 TABLET, FILM COATED, EXTENDED RELEASE ORAL at 09:21

## 2023-01-01 RX ADMIN — MIRABEGRON 25 MG: 25 TABLET, FILM COATED, EXTENDED RELEASE ORAL at 09:04

## 2023-01-01 RX ADMIN — AMOXICILLIN AND CLAVULANATE POTASSIUM 1 TABLET: 500; 125 TABLET, FILM COATED ORAL at 20:32

## 2023-01-01 RX ADMIN — TOLTERODINE TARTRATE 2 MG: 2 CAPSULE, EXTENDED RELEASE ORAL at 10:46

## 2023-01-01 RX ADMIN — UMECLIDINIUM 1 PUFF: 62.5 AEROSOL, POWDER ORAL at 22:45

## 2023-01-01 RX ADMIN — SODIUM CHLORIDE 500 ML: 9 INJECTION, SOLUTION INTRAVENOUS at 18:42

## 2023-01-01 RX ADMIN — ESCITALOPRAM OXALATE 10 MG: 10 TABLET ORAL at 08:31

## 2023-01-01 RX ADMIN — FLUTICASONE FUROATE AND VILANTEROL TRIFENATATE 1 PUFF: 200; 25 POWDER RESPIRATORY (INHALATION) at 08:51

## 2023-01-01 RX ADMIN — LOPERAMIDE HYDROCHLORIDE 2 MG: 2 CAPSULE ORAL at 19:27

## 2023-01-01 RX ADMIN — LOSARTAN POTASSIUM 12.5 MG: 25 TABLET, FILM COATED ORAL at 16:56

## 2023-01-01 RX ADMIN — FUROSEMIDE 80 MG: 10 INJECTION, SOLUTION INTRAMUSCULAR; INTRAVENOUS at 16:33

## 2023-01-01 RX ADMIN — DIPHENOXYLATE HYDROCHLORIDE AND ATROPINE SULFATE 1 TABLET: 2.5; .025 TABLET ORAL at 18:08

## 2023-01-01 RX ADMIN — FUROSEMIDE 80 MG: 10 INJECTION, SOLUTION INTRAMUSCULAR; INTRAVENOUS at 09:53

## 2023-01-01 RX ADMIN — SPIRONOLACTONE 25 MG: 25 TABLET ORAL at 08:48

## 2023-01-01 RX ADMIN — TRAMADOL HYDROCHLORIDE 50 MG: 50 TABLET, COATED ORAL at 00:54

## 2023-01-01 RX ADMIN — HYDROMORPHONE HYDROCHLORIDE 0.5 MG: 1 INJECTION, SOLUTION INTRAMUSCULAR; INTRAVENOUS; SUBCUTANEOUS at 12:25

## 2023-01-01 RX ADMIN — ACETYLCYSTEINE 4 ML: 200 SOLUTION ORAL; RESPIRATORY (INHALATION) at 00:32

## 2023-01-01 RX ADMIN — BUMETANIDE 1 MG: 1 TABLET ORAL at 18:13

## 2023-01-01 RX ADMIN — TRAMADOL HYDROCHLORIDE 50 MG: 50 TABLET, COATED ORAL at 08:05

## 2023-01-01 RX ADMIN — HYDROXYZINE HYDROCHLORIDE 25 MG: 25 TABLET, FILM COATED ORAL at 00:35

## 2023-01-01 RX ADMIN — ACETAMINOPHEN 650 MG: 325 TABLET ORAL at 11:27

## 2023-01-01 RX ADMIN — SENNOSIDES AND DOCUSATE SODIUM 1 TABLET: 50; 8.6 TABLET ORAL at 20:31

## 2023-01-01 RX ADMIN — TOLTERODINE TARTRATE 2 MG: 2 CAPSULE, EXTENDED RELEASE ORAL at 08:48

## 2023-01-01 RX ADMIN — FUROSEMIDE 20 MG: 10 INJECTION, SOLUTION INTRAMUSCULAR; INTRAVENOUS at 09:18

## 2023-01-01 RX ADMIN — OXYCODONE HYDROCHLORIDE 10 MG: 100 SOLUTION ORAL at 05:08

## 2023-01-01 RX ADMIN — PIPERACILLIN AND TAZOBACTAM 3.38 G: 3; .375 INJECTION, POWDER, LYOPHILIZED, FOR SOLUTION INTRAVENOUS at 14:37

## 2023-01-01 RX ADMIN — TRAMADOL HYDROCHLORIDE 50 MG: 50 TABLET, COATED ORAL at 12:18

## 2023-01-01 RX ADMIN — DOXYCYCLINE 100 MG: 100 INJECTION, POWDER, LYOPHILIZED, FOR SOLUTION INTRAVENOUS at 20:49

## 2023-01-01 RX ADMIN — AMOXICILLIN AND CLAVULANATE POTASSIUM 1 TABLET: 500; 125 TABLET, FILM COATED ORAL at 09:02

## 2023-01-01 RX ADMIN — OXYCODONE HYDROCHLORIDE 5 MG: 5 TABLET ORAL at 21:33

## 2023-01-01 RX ADMIN — UMECLIDINIUM 1 PUFF: 62.5 AEROSOL, POWDER ORAL at 20:50

## 2023-01-01 RX ADMIN — SIMVASTATIN 40 MG: 40 TABLET, FILM COATED ORAL at 21:29

## 2023-01-01 RX ADMIN — PIPERACILLIN AND TAZOBACTAM 3.38 G: 3; .375 INJECTION, POWDER, LYOPHILIZED, FOR SOLUTION INTRAVENOUS at 23:14

## 2023-01-01 RX ADMIN — DOXYCYCLINE 100 MG: 100 INJECTION, POWDER, LYOPHILIZED, FOR SOLUTION INTRAVENOUS at 09:10

## 2023-01-01 RX ADMIN — FAMOTIDINE 20 MG: 20 TABLET ORAL at 21:23

## 2023-01-01 RX ADMIN — RIVAROXABAN 15 MG: 15 TABLET, FILM COATED ORAL at 21:42

## 2023-01-01 RX ADMIN — RIVAROXABAN 15 MG: 15 TABLET, FILM COATED ORAL at 22:32

## 2023-01-01 RX ADMIN — METOPROLOL SUCCINATE 75 MG: 50 TABLET, EXTENDED RELEASE ORAL at 20:48

## 2023-01-01 RX ADMIN — HYDROMORPHONE HYDROCHLORIDE 0.2 MG: 0.2 INJECTION, SOLUTION INTRAMUSCULAR; INTRAVENOUS; SUBCUTANEOUS at 21:33

## 2023-01-01 RX ADMIN — BENZONATATE 100 MG: 100 CAPSULE ORAL at 03:36

## 2023-01-01 RX ADMIN — TRAMADOL HYDROCHLORIDE 50 MG: 50 TABLET, COATED ORAL at 09:10

## 2023-01-01 RX ADMIN — PREDNISONE 40 MG: 20 TABLET ORAL at 08:00

## 2023-01-01 RX ADMIN — ESCITALOPRAM OXALATE 10 MG: 10 TABLET ORAL at 09:10

## 2023-01-01 RX ADMIN — HYDROMORPHONE HYDROCHLORIDE 1 MG: 1 INJECTION, SOLUTION INTRAMUSCULAR; INTRAVENOUS; SUBCUTANEOUS at 12:07

## 2023-01-01 RX ADMIN — METOPROLOL SUCCINATE 75 MG: 25 TABLET, EXTENDED RELEASE ORAL at 20:18

## 2023-01-01 RX ADMIN — TOLTERODINE TARTRATE 2 MG: 2 CAPSULE, EXTENDED RELEASE ORAL at 08:43

## 2023-01-01 RX ADMIN — LORAZEPAM 1 MG: 1 TABLET ORAL at 10:16

## 2023-01-01 RX ADMIN — FLUTICASONE FUROATE AND VILANTEROL TRIFENATATE 1 PUFF: 200; 25 POWDER RESPIRATORY (INHALATION) at 09:23

## 2023-01-01 RX ADMIN — UMECLIDINIUM 1 PUFF: 62.5 AEROSOL, POWDER ORAL at 21:03

## 2023-01-01 RX ADMIN — OLANZAPINE 5 MG: 5 TABLET, FILM COATED ORAL at 20:51

## 2023-01-01 RX ADMIN — TAMOXIFEN CITRATE 20 MG: 10 TABLET ORAL at 16:19

## 2023-01-01 RX ADMIN — ESCITALOPRAM OXALATE 10 MG: 10 TABLET ORAL at 09:44

## 2023-01-01 RX ADMIN — FAMOTIDINE 20 MG: 20 TABLET ORAL at 22:32

## 2023-01-01 RX ADMIN — TRAMADOL HYDROCHLORIDE 50 MG: 50 TABLET, COATED ORAL at 09:47

## 2023-01-01 RX ADMIN — FUROSEMIDE 40 MG: 20 TABLET ORAL at 08:42

## 2023-01-01 RX ADMIN — DOXYCYCLINE 100 MG: 100 CAPSULE ORAL at 08:59

## 2023-01-01 RX ADMIN — FLUTICASONE FUROATE AND VILANTEROL TRIFENATATE 1 PUFF: 200; 25 POWDER RESPIRATORY (INHALATION) at 21:03

## 2023-01-01 RX ADMIN — ATROPINE SULFATE 2 DROP: 10 SOLUTION/ DROPS OPHTHALMIC at 02:05

## 2023-01-01 RX ADMIN — PIPERACILLIN AND TAZOBACTAM 3.38 G: 3; .375 INJECTION, POWDER, LYOPHILIZED, FOR SOLUTION INTRAVENOUS at 17:05

## 2023-01-01 RX ADMIN — ESCITALOPRAM OXALATE 10 MG: 10 TABLET ORAL at 08:00

## 2023-01-01 RX ADMIN — HYDROMORPHONE HYDROCHLORIDE 0.3 MG: 1 INJECTION, SOLUTION INTRAMUSCULAR; INTRAVENOUS; SUBCUTANEOUS at 21:11

## 2023-01-01 RX ADMIN — TRAMADOL HYDROCHLORIDE 50 MG: 50 TABLET, COATED ORAL at 08:52

## 2023-01-01 RX ADMIN — IPRATROPIUM BROMIDE 2 PUFF: 17 AEROSOL, METERED RESPIRATORY (INHALATION) at 18:04

## 2023-01-01 RX ADMIN — SPIRONOLACTONE 25 MG: 25 TABLET ORAL at 09:10

## 2023-01-01 RX ADMIN — IPRATROPIUM BROMIDE 2 PUFF: 17 AEROSOL, METERED RESPIRATORY (INHALATION) at 10:39

## 2023-01-01 RX ADMIN — FUROSEMIDE 40 MG: 20 TABLET ORAL at 08:06

## 2023-01-01 RX ADMIN — PIPERACILLIN AND TAZOBACTAM 3.38 G: 3; .375 INJECTION, POWDER, LYOPHILIZED, FOR SOLUTION INTRAVENOUS at 18:47

## 2023-01-01 RX ADMIN — ALBUTEROL SULFATE 2.5 MG: 2.5 SOLUTION RESPIRATORY (INHALATION) at 04:10

## 2023-01-01 RX ADMIN — UMECLIDINIUM 1 PUFF: 62.5 AEROSOL, POWDER ORAL at 21:51

## 2023-01-01 RX ADMIN — TRAMADOL HYDROCHLORIDE 50 MG: 50 TABLET, COATED ORAL at 13:17

## 2023-01-01 RX ADMIN — PIPERACILLIN AND TAZOBACTAM 3.38 G: 3; .375 INJECTION, POWDER, LYOPHILIZED, FOR SOLUTION INTRAVENOUS at 21:31

## 2023-01-01 RX ADMIN — METOPROLOL SUCCINATE 75 MG: 50 TABLET, EXTENDED RELEASE ORAL at 22:28

## 2023-01-01 RX ADMIN — PREDNISONE 40 MG: 20 TABLET ORAL at 08:40

## 2023-01-01 RX ADMIN — RIVAROXABAN 15 MG: 15 TABLET, FILM COATED ORAL at 21:52

## 2023-01-01 RX ADMIN — IPRATROPIUM BROMIDE 2 PUFF: 17 AEROSOL, METERED RESPIRATORY (INHALATION) at 15:39

## 2023-01-01 RX ADMIN — TRAMADOL HYDROCHLORIDE 50 MG: 50 TABLET, COATED ORAL at 23:49

## 2023-01-01 RX ADMIN — SIMVASTATIN 40 MG: 20 TABLET, FILM COATED ORAL at 23:49

## 2023-01-01 RX ADMIN — SPIRONOLACTONE 12.5 MG: 25 TABLET ORAL at 12:19

## 2023-01-01 RX ADMIN — TAMOXIFEN CITRATE 20 MG: 10 TABLET ORAL at 20:56

## 2023-01-01 RX ADMIN — IOPAMIDOL 53 ML: 755 INJECTION, SOLUTION INTRAVENOUS at 15:12

## 2023-01-01 RX ADMIN — PREDNISONE 40 MG: 20 TABLET ORAL at 07:58

## 2023-01-01 RX ADMIN — VANCOMYCIN HYDROCHLORIDE 1000 MG: 1 INJECTION, SOLUTION INTRAVENOUS at 20:57

## 2023-01-01 RX ADMIN — PREDNISONE 20 MG: 20 TABLET ORAL at 09:12

## 2023-01-01 RX ADMIN — GABAPENTIN 600 MG: 600 TABLET, FILM COATED ORAL at 20:25

## 2023-01-01 RX ADMIN — ESCITALOPRAM OXALATE 10 MG: 10 TABLET ORAL at 07:58

## 2023-01-01 RX ADMIN — PIPERACILLIN AND TAZOBACTAM 3.38 G: 3; .375 INJECTION, POWDER, LYOPHILIZED, FOR SOLUTION INTRAVENOUS at 14:05

## 2023-01-01 RX ADMIN — LORAZEPAM 0.2 MG: 2 INJECTION INTRAMUSCULAR; INTRAVENOUS at 23:51

## 2023-01-01 RX ADMIN — HYDROXYZINE HYDROCHLORIDE 50 MG: 25 TABLET, FILM COATED ORAL at 13:35

## 2023-01-01 RX ADMIN — FUROSEMIDE 20 MG: 10 INJECTION, SOLUTION INTRAMUSCULAR; INTRAVENOUS at 21:27

## 2023-01-01 RX ADMIN — UMECLIDINIUM 1 PUFF: 62.5 AEROSOL, POWDER ORAL at 08:51

## 2023-01-01 RX ADMIN — FUROSEMIDE 80 MG: 40 TABLET ORAL at 08:31

## 2023-01-01 RX ADMIN — FLUTICASONE FUROATE AND VILANTEROL TRIFENATATE 1 PUFF: 200; 25 POWDER RESPIRATORY (INHALATION) at 08:13

## 2023-01-01 RX ADMIN — PREDNISONE 40 MG: 20 TABLET ORAL at 09:07

## 2023-01-01 RX ADMIN — FUROSEMIDE 20 MG: 10 INJECTION, SOLUTION INTRAMUSCULAR; INTRAVENOUS at 23:09

## 2023-01-01 RX ADMIN — SPIRONOLACTONE 25 MG: 25 TABLET ORAL at 09:44

## 2023-01-01 RX ADMIN — FLUTICASONE FUROATE AND VILANTEROL TRIFENATATE 1 PUFF: 200; 25 POWDER RESPIRATORY (INHALATION) at 08:07

## 2023-01-01 RX ADMIN — PIPERACILLIN AND TAZOBACTAM 3.38 G: 3; .375 INJECTION, POWDER, LYOPHILIZED, FOR SOLUTION INTRAVENOUS at 06:39

## 2023-01-01 RX ADMIN — TOLTERODINE TARTRATE 2 MG: 2 CAPSULE, EXTENDED RELEASE ORAL at 09:44

## 2023-01-01 RX ADMIN — GABAPENTIN 600 MG: 600 TABLET, FILM COATED ORAL at 21:23

## 2023-01-01 RX ADMIN — MICONAZOLE NITRATE: 2 POWDER TOPICAL at 20:19

## 2023-01-01 RX ADMIN — ACETYLCYSTEINE 4 ML: 200 SOLUTION ORAL; RESPIRATORY (INHALATION) at 01:11

## 2023-01-01 RX ADMIN — LORAZEPAM 0.5 MG: 2 SOLUTION, CONCENTRATE ORAL at 17:27

## 2023-01-01 RX ADMIN — HYDROMORPHONE HYDROCHLORIDE 1 MG: 1 INJECTION, SOLUTION INTRAMUSCULAR; INTRAVENOUS; SUBCUTANEOUS at 01:58

## 2023-01-01 RX ADMIN — OXYCODONE HYDROCHLORIDE 5 MG: 5 TABLET ORAL at 09:44

## 2023-01-01 RX ADMIN — TAMOXIFEN CITRATE 20 MG: 10 TABLET ORAL at 12:19

## 2023-01-01 RX ADMIN — TOLTERODINE TARTRATE 2 MG: 2 CAPSULE, EXTENDED RELEASE ORAL at 12:09

## 2023-01-01 RX ADMIN — FLUTICASONE FUROATE AND VILANTEROL TRIFENATATE 1 PUFF: 200; 25 POWDER RESPIRATORY (INHALATION) at 21:22

## 2023-01-01 RX ADMIN — ANORECTAL OINTMENT: 15.7; .44; 24; 20.6 OINTMENT TOPICAL at 09:13

## 2023-01-01 RX ADMIN — Medication 1 MG: at 23:05

## 2023-01-01 RX ADMIN — TRAMADOL HYDROCHLORIDE 50 MG: 50 TABLET, COATED ORAL at 19:43

## 2023-01-01 RX ADMIN — LORAZEPAM 0.5 MG: 0.5 TABLET ORAL at 20:54

## 2023-01-01 RX ADMIN — FUROSEMIDE 40 MG: 20 TABLET ORAL at 17:21

## 2023-01-01 RX ADMIN — ESCITALOPRAM OXALATE 10 MG: 10 TABLET ORAL at 08:43

## 2023-01-01 RX ADMIN — GABAPENTIN 300 MG: 300 CAPSULE ORAL at 21:03

## 2023-01-01 RX ADMIN — LOSARTAN POTASSIUM 12.5 MG: 25 TABLET, FILM COATED ORAL at 08:00

## 2023-01-01 RX ADMIN — DOXYCYCLINE 100 MG: 100 INJECTION, POWDER, LYOPHILIZED, FOR SOLUTION INTRAVENOUS at 13:50

## 2023-01-01 RX ADMIN — OXYCODONE HYDROCHLORIDE 10 MG: 100 SOLUTION ORAL at 16:17

## 2023-01-01 RX ADMIN — LORAZEPAM 0.5 MG: 2 SOLUTION, CONCENTRATE ORAL at 16:16

## 2023-01-01 RX ADMIN — FAMOTIDINE 20 MG: 20 TABLET ORAL at 09:08

## 2023-01-01 RX ADMIN — TAMOXIFEN CITRATE 20 MG: 10 TABLET ORAL at 09:30

## 2023-01-01 RX ADMIN — MIRABEGRON 25 MG: 25 TABLET, FILM COATED, EXTENDED RELEASE ORAL at 09:10

## 2023-01-01 RX ADMIN — NITROGLYCERIN 0.4 MG: 0.4 TABLET SUBLINGUAL at 13:14

## 2023-01-01 RX ADMIN — PIPERACILLIN AND TAZOBACTAM 3.38 G: 3; .375 INJECTION, POWDER, LYOPHILIZED, FOR SOLUTION INTRAVENOUS at 06:16

## 2023-01-01 RX ADMIN — BUMETANIDE 1 MG: 1 TABLET ORAL at 08:45

## 2023-01-01 RX ADMIN — TAMOXIFEN CITRATE 20 MG: 10 TABLET ORAL at 20:21

## 2023-01-01 RX ADMIN — DOXYCYCLINE 100 MG: 100 INJECTION, POWDER, LYOPHILIZED, FOR SOLUTION INTRAVENOUS at 17:27

## 2023-01-01 RX ADMIN — CALCIUM CARBONATE (ANTACID) CHEW TAB 500 MG 1000 MG: 500 CHEW TAB at 10:37

## 2023-01-01 RX ADMIN — OLANZAPINE 5 MG: 5 TABLET, FILM COATED ORAL at 21:24

## 2023-01-01 RX ADMIN — ESCITALOPRAM OXALATE 10 MG: 10 TABLET ORAL at 08:59

## 2023-01-01 RX ADMIN — ACETAMINOPHEN 975 MG: 325 TABLET ORAL at 21:39

## 2023-01-01 RX ADMIN — SIMVASTATIN 40 MG: 40 TABLET, FILM COATED ORAL at 21:21

## 2023-01-01 RX ADMIN — OXYCODONE HYDROCHLORIDE 10 MG: 100 SOLUTION ORAL at 21:10

## 2023-01-01 RX ADMIN — METOPROLOL SUCCINATE 75 MG: 25 TABLET, EXTENDED RELEASE ORAL at 22:40

## 2023-01-01 RX ADMIN — IPRATROPIUM BROMIDE AND ALBUTEROL SULFATE 3 ML: .5; 3 SOLUTION RESPIRATORY (INHALATION) at 07:55

## 2023-01-01 RX ADMIN — TAMOXIFEN CITRATE 20 MG: 10 TABLET ORAL at 08:58

## 2023-01-01 RX ADMIN — CALCIUM CARBONATE (ANTACID) CHEW TAB 500 MG 500 MG: 500 CHEW TAB at 19:26

## 2023-01-01 RX ADMIN — PIPERACILLIN AND TAZOBACTAM 3.38 G: 3; .375 INJECTION, POWDER, LYOPHILIZED, FOR SOLUTION INTRAVENOUS at 00:21

## 2023-01-01 RX ADMIN — KETOROLAC TROMETHAMINE 15 MG: 15 INJECTION INTRAMUSCULAR; INTRAVENOUS at 14:20

## 2023-01-01 RX ADMIN — HYDROMORPHONE HYDROCHLORIDE 0.2 MG: 0.2 INJECTION, SOLUTION INTRAMUSCULAR; INTRAVENOUS; SUBCUTANEOUS at 06:19

## 2023-01-01 RX ADMIN — ESCITALOPRAM OXALATE 10 MG: 10 TABLET ORAL at 08:48

## 2023-01-01 RX ADMIN — FUROSEMIDE 20 MG: 20 TABLET ORAL at 08:37

## 2023-01-01 RX ADMIN — METOPROLOL SUCCINATE 50 MG: 50 TABLET, EXTENDED RELEASE ORAL at 21:23

## 2023-01-01 RX ADMIN — TRAMADOL HYDROCHLORIDE 50 MG: 50 TABLET, COATED ORAL at 08:30

## 2023-01-01 RX ADMIN — ACETAMINOPHEN 975 MG: 325 TABLET ORAL at 20:41

## 2023-01-01 RX ADMIN — TRAMADOL HYDROCHLORIDE 50 MG: 50 TABLET, COATED ORAL at 00:36

## 2023-01-01 RX ADMIN — PIPERACILLIN AND TAZOBACTAM 3.38 G: 3; .375 INJECTION, POWDER, LYOPHILIZED, FOR SOLUTION INTRAVENOUS at 09:02

## 2023-01-01 RX ADMIN — ACETAMINOPHEN 975 MG: 325 TABLET ORAL at 21:00

## 2023-01-01 RX ADMIN — FAMOTIDINE 20 MG: 20 TABLET ORAL at 21:14

## 2023-01-01 RX ADMIN — TOLTERODINE TARTRATE 2 MG: 2 CAPSULE, EXTENDED RELEASE ORAL at 07:58

## 2023-01-01 RX ADMIN — METOPROLOL SUCCINATE 75 MG: 25 TABLET, EXTENDED RELEASE ORAL at 22:24

## 2023-01-01 RX ADMIN — PIPERACILLIN AND TAZOBACTAM 3.38 G: 3; .375 INJECTION, POWDER, LYOPHILIZED, FOR SOLUTION INTRAVENOUS at 07:32

## 2023-01-01 RX ADMIN — HYDROMORPHONE HYDROCHLORIDE 1 MG: 1 INJECTION, SOLUTION INTRAMUSCULAR; INTRAVENOUS; SUBCUTANEOUS at 00:27

## 2023-01-01 RX ADMIN — HYDROXYZINE HYDROCHLORIDE 50 MG: 25 TABLET, FILM COATED ORAL at 21:39

## 2023-01-01 RX ADMIN — METOPROLOL SUCCINATE 50 MG: 50 TABLET, EXTENDED RELEASE ORAL at 22:23

## 2023-01-01 RX ADMIN — ACETAMINOPHEN 650 MG: 325 TABLET ORAL at 14:05

## 2023-01-01 RX ADMIN — SODIUM CHLORIDE: 9 INJECTION, SOLUTION INTRAVENOUS at 01:12

## 2023-01-01 RX ADMIN — TRAMADOL HYDROCHLORIDE 50 MG: 50 TABLET, COATED ORAL at 09:56

## 2023-01-01 RX ADMIN — UMECLIDINIUM 1 PUFF: 62.5 AEROSOL, POWDER ORAL at 08:13

## 2023-01-01 RX ADMIN — TRAMADOL HYDROCHLORIDE 50 MG: 50 TABLET, COATED ORAL at 18:21

## 2023-01-01 RX ADMIN — TOLTERODINE TARTRATE 2 MG: 2 CAPSULE, EXTENDED RELEASE ORAL at 09:04

## 2023-01-01 RX ADMIN — PREDNISONE 40 MG: 20 TABLET ORAL at 09:10

## 2023-01-01 RX ADMIN — BUMETANIDE 1 MG: 1 TABLET ORAL at 09:44

## 2023-01-01 RX ADMIN — CETIRIZINE HYDROCHLORIDE 10 MG: 10 TABLET, FILM COATED ORAL at 03:54

## 2023-01-01 RX ADMIN — MIRABEGRON 25 MG: 25 TABLET, FILM COATED, EXTENDED RELEASE ORAL at 21:02

## 2023-01-01 RX ADMIN — ALBUTEROL SULFATE 2.5 MG: 2.5 SOLUTION RESPIRATORY (INHALATION) at 16:00

## 2023-01-01 RX ADMIN — GABAPENTIN 600 MG: 600 TABLET, FILM COATED ORAL at 23:49

## 2023-01-01 RX ADMIN — Medication 1 MG: at 21:10

## 2023-01-01 RX ADMIN — METHYLPREDNISOLONE SODIUM SUCCINATE 24 MG: 40 INJECTION, POWDER, FOR SOLUTION INTRAMUSCULAR; INTRAVENOUS at 07:58

## 2023-01-01 RX ADMIN — TOLTERODINE TARTRATE 2 MG: 2 CAPSULE, EXTENDED RELEASE ORAL at 08:01

## 2023-01-01 RX ADMIN — BUMETANIDE 1 MG: 1 TABLET ORAL at 09:31

## 2023-01-01 RX ADMIN — PREDNISONE 20 MG: 20 TABLET ORAL at 09:31

## 2023-01-01 RX ADMIN — FAMOTIDINE 20 MG: 20 TABLET ORAL at 23:49

## 2023-01-01 RX ADMIN — LORAZEPAM 0.5 MG: 2 INJECTION INTRAMUSCULAR; INTRAVENOUS at 13:57

## 2023-01-01 RX ADMIN — LORAZEPAM 0.25 MG: 2 INJECTION INTRAMUSCULAR; INTRAVENOUS at 03:22

## 2023-01-01 RX ADMIN — GABAPENTIN 600 MG: 600 TABLET, FILM COATED ORAL at 20:48

## 2023-01-01 RX ADMIN — MIRABEGRON 25 MG: 25 TABLET, FILM COATED, EXTENDED RELEASE ORAL at 08:59

## 2023-01-01 RX ADMIN — FUROSEMIDE 20 MG: 20 TABLET ORAL at 09:18

## 2023-01-01 RX ADMIN — FLUTICASONE FUROATE AND VILANTEROL TRIFENATATE 1 PUFF: 200; 25 POWDER RESPIRATORY (INHALATION) at 22:33

## 2023-01-01 RX ADMIN — ACETAMINOPHEN 975 MG: 325 TABLET ORAL at 09:02

## 2023-01-01 RX ADMIN — METOPROLOL SUCCINATE 75 MG: 25 TABLET, EXTENDED RELEASE ORAL at 21:26

## 2023-01-01 RX ADMIN — GABAPENTIN 600 MG: 600 TABLET, FILM COATED ORAL at 21:48

## 2023-01-01 RX ADMIN — ASPIRIN 325 MG ORAL TABLET 325 MG: 325 PILL ORAL at 13:14

## 2023-01-01 RX ADMIN — SIMVASTATIN 40 MG: 20 TABLET, FILM COATED ORAL at 21:37

## 2023-01-01 RX ADMIN — PIPERACILLIN AND TAZOBACTAM 3.38 G: 3; .375 INJECTION, POWDER, LYOPHILIZED, FOR SOLUTION INTRAVENOUS at 09:18

## 2023-01-01 RX ADMIN — TOLTERODINE TARTRATE 2 MG: 2 CAPSULE, EXTENDED RELEASE ORAL at 09:21

## 2023-01-01 RX ADMIN — FAMOTIDINE 20 MG: 20 TABLET ORAL at 21:39

## 2023-01-01 RX ADMIN — PREDNISONE 40 MG: 20 TABLET ORAL at 08:42

## 2023-01-01 RX ADMIN — BENZONATATE 100 MG: 100 CAPSULE ORAL at 03:54

## 2023-01-01 RX ADMIN — GLYCOPYRROLATE 0.2 MG: 0.2 INJECTION, SOLUTION INTRAMUSCULAR; INTRAVENOUS at 14:25

## 2023-01-01 RX ADMIN — IPRATROPIUM BROMIDE AND ALBUTEROL SULFATE 3 ML: .5; 3 SOLUTION RESPIRATORY (INHALATION) at 16:39

## 2023-01-01 RX ADMIN — RIVAROXABAN 15 MG: 15 TABLET, FILM COATED ORAL at 22:06

## 2023-01-01 RX ADMIN — TRAMADOL HYDROCHLORIDE 50 MG: 50 TABLET, COATED ORAL at 00:05

## 2023-01-01 RX ADMIN — HYDROMORPHONE HYDROCHLORIDE 0.2 MG: 0.2 INJECTION, SOLUTION INTRAMUSCULAR; INTRAVENOUS; SUBCUTANEOUS at 20:22

## 2023-01-01 RX ADMIN — CEFTRIAXONE 1 G: 1 INJECTION, POWDER, FOR SOLUTION INTRAMUSCULAR; INTRAVENOUS at 20:47

## 2023-01-01 RX ADMIN — POTASSIUM CHLORIDE 20 MEQ: 1500 TABLET, EXTENDED RELEASE ORAL at 11:51

## 2023-01-01 RX ADMIN — HYDROXYZINE HYDROCHLORIDE 50 MG: 25 TABLET, FILM COATED ORAL at 22:10

## 2023-01-01 RX ADMIN — KETOROLAC TROMETHAMINE 15 MG: 15 INJECTION INTRAMUSCULAR; INTRAVENOUS at 00:29

## 2023-01-01 RX ADMIN — ACETYLCYSTEINE 4 ML: 200 SOLUTION ORAL; RESPIRATORY (INHALATION) at 04:10

## 2023-01-01 RX ADMIN — PIPERACILLIN AND TAZOBACTAM 3.38 G: 3; .375 INJECTION, POWDER, LYOPHILIZED, FOR SOLUTION INTRAVENOUS at 14:16

## 2023-01-01 RX ADMIN — TOLTERODINE TARTRATE 2 MG: 2 CAPSULE, EXTENDED RELEASE ORAL at 12:19

## 2023-01-01 RX ADMIN — AMOXICILLIN AND CLAVULANATE POTASSIUM 1 TABLET: 875; 125 TABLET, FILM COATED ORAL at 08:37

## 2023-01-01 RX ADMIN — TRAMADOL HYDROCHLORIDE 50 MG: 50 TABLET, COATED ORAL at 15:36

## 2023-01-01 RX ADMIN — MORPHINE SULFATE 10 MG: 10 INJECTION INTRAVENOUS at 16:27

## 2023-01-01 RX ADMIN — UMECLIDINIUM 1 PUFF: 62.5 AEROSOL, POWDER ORAL at 22:12

## 2023-01-01 RX ADMIN — POTASSIUM CHLORIDE 40 MEQ: 1500 TABLET, EXTENDED RELEASE ORAL at 08:37

## 2023-01-01 RX ADMIN — HYDROMORPHONE HYDROCHLORIDE 0.2 MG: 0.2 INJECTION, SOLUTION INTRAMUSCULAR; INTRAVENOUS; SUBCUTANEOUS at 14:59

## 2023-01-01 RX ADMIN — FUROSEMIDE 80 MG: 20 TABLET ORAL at 08:52

## 2023-01-01 RX ADMIN — BUMETANIDE 1 MG: 1 TABLET ORAL at 17:14

## 2023-01-01 RX ADMIN — FLUTICASONE FUROATE AND VILANTEROL TRIFENATATE 1 PUFF: 200; 25 POWDER RESPIRATORY (INHALATION) at 08:00

## 2023-01-01 RX ADMIN — FUROSEMIDE 40 MG: 40 TABLET ORAL at 12:18

## 2023-01-01 RX ADMIN — LORAZEPAM 0.5 MG: 2 INJECTION INTRAMUSCULAR; INTRAVENOUS at 03:29

## 2023-01-01 RX ADMIN — ESCITALOPRAM OXALATE 10 MG: 10 TABLET ORAL at 09:11

## 2023-01-01 RX ADMIN — CEFTRIAXONE 1 G: 1 INJECTION, POWDER, FOR SOLUTION INTRAMUSCULAR; INTRAVENOUS at 20:17

## 2023-01-01 RX ADMIN — TAMOXIFEN CITRATE 20 MG: 10 TABLET ORAL at 09:44

## 2023-01-01 RX ADMIN — DOXYCYCLINE 100 MG: 100 CAPSULE ORAL at 09:44

## 2023-01-01 RX ADMIN — ACETAMINOPHEN 975 MG: 325 TABLET ORAL at 08:44

## 2023-01-01 RX ADMIN — LORAZEPAM 0.5 MG: 2 INJECTION INTRAMUSCULAR; INTRAVENOUS at 22:33

## 2023-01-01 RX ADMIN — OXYCODONE HYDROCHLORIDE 5 MG: 5 TABLET ORAL at 22:06

## 2023-01-01 RX ADMIN — BENZONATATE 100 MG: 100 CAPSULE ORAL at 22:32

## 2023-01-01 RX ADMIN — PREDNISONE 40 MG: 20 TABLET ORAL at 09:44

## 2023-01-01 RX ADMIN — Medication 1 SPRAY: at 18:57

## 2023-01-01 RX ADMIN — HYDROMORPHONE HYDROCHLORIDE 1 MG: 1 INJECTION, SOLUTION INTRAMUSCULAR; INTRAVENOUS; SUBCUTANEOUS at 13:57

## 2023-01-01 RX ADMIN — TAMOXIFEN CITRATE 20 MG: 10 TABLET ORAL at 09:08

## 2023-01-01 RX ADMIN — OXYCODONE HYDROCHLORIDE 5 MG: 5 TABLET ORAL at 13:34

## 2023-01-01 RX ADMIN — ALBUTEROL SULFATE 2 PUFF: 90 AEROSOL, METERED RESPIRATORY (INHALATION) at 22:05

## 2023-01-01 RX ADMIN — TRAMADOL HYDROCHLORIDE 50 MG: 50 TABLET, COATED ORAL at 16:06

## 2023-01-01 RX ADMIN — ACETAMINOPHEN 650 MG: 325 TABLET ORAL at 09:47

## 2023-01-01 RX ADMIN — TRAMADOL HYDROCHLORIDE 50 MG: 50 TABLET, COATED ORAL at 06:02

## 2023-01-01 RX ADMIN — IPRATROPIUM BROMIDE AND ALBUTEROL SULFATE 3 ML: .5; 3 SOLUTION RESPIRATORY (INHALATION) at 18:05

## 2023-01-01 RX ADMIN — FLUTICASONE FUROATE AND VILANTEROL TRIFENATATE 1 PUFF: 200; 25 POWDER RESPIRATORY (INHALATION) at 21:23

## 2023-01-01 RX ADMIN — KIT FOR THE PREPARATION OF TECHNETIUM TC 99M ALBUMIN AGGREGATED 8.04 MILLICURIE: 2.5 INJECTION, POWDER, FOR SOLUTION INTRAVENOUS at 11:28

## 2023-01-01 RX ADMIN — FLUTICASONE FUROATE AND VILANTEROL TRIFENATATE 1 PUFF: 200; 25 POWDER RESPIRATORY (INHALATION) at 09:13

## 2023-01-01 RX ADMIN — ESCITALOPRAM OXALATE 10 MG: 10 TABLET ORAL at 09:04

## 2023-01-01 RX ADMIN — NITROGLYCERIN 0.4 MG: 0.4 TABLET SUBLINGUAL at 13:05

## 2023-01-01 RX ADMIN — BUMETANIDE 1 MG: 1 TABLET ORAL at 17:52

## 2023-01-01 RX ADMIN — MIRABEGRON 25 MG: 25 TABLET, FILM COATED, EXTENDED RELEASE ORAL at 09:18

## 2023-01-01 RX ADMIN — IPRATROPIUM BROMIDE AND ALBUTEROL SULFATE 3 ML: .5; 3 SOLUTION RESPIRATORY (INHALATION) at 16:06

## 2023-01-01 RX ADMIN — MIRABEGRON 25 MG: 25 TABLET, FILM COATED, EXTENDED RELEASE ORAL at 09:31

## 2023-01-01 RX ADMIN — RIVAROXABAN 15 MG: 15 TABLET, FILM COATED ORAL at 21:24

## 2023-01-01 RX ADMIN — UMECLIDINIUM 1 PUFF: 62.5 AEROSOL, POWDER ORAL at 12:21

## 2023-01-01 RX ADMIN — ESCITALOPRAM OXALATE 10 MG: 10 TABLET ORAL at 09:05

## 2023-01-01 RX ADMIN — GABAPENTIN 600 MG: 600 TABLET, FILM COATED ORAL at 21:38

## 2023-01-01 RX ADMIN — PREDNISONE 40 MG: 20 TABLET ORAL at 11:50

## 2023-01-01 RX ADMIN — MIRABEGRON 25 MG: 25 TABLET, FILM COATED, EXTENDED RELEASE ORAL at 09:30

## 2023-01-01 RX ADMIN — FENTANYL CITRATE 25 MCG: 50 INJECTION, SOLUTION INTRAMUSCULAR; INTRAVENOUS at 01:35

## 2023-01-01 RX ADMIN — AMOXICILLIN AND CLAVULANATE POTASSIUM 1 TABLET: 500; 125 TABLET, FILM COATED ORAL at 09:05

## 2023-01-01 RX ADMIN — GLYCOPYRROLATE 0.2 MG: 0.2 INJECTION, SOLUTION INTRAMUSCULAR; INTRAVENOUS at 22:37

## 2023-01-01 RX ADMIN — UMECLIDINIUM 1 PUFF: 62.5 AEROSOL, POWDER ORAL at 08:46

## 2023-01-01 RX ADMIN — HYDROMORPHONE HYDROCHLORIDE 0.3 MG: 1 INJECTION, SOLUTION INTRAMUSCULAR; INTRAVENOUS; SUBCUTANEOUS at 12:31

## 2023-01-01 RX ADMIN — CEFTRIAXONE 1 G: 1 INJECTION, POWDER, FOR SOLUTION INTRAMUSCULAR; INTRAVENOUS at 22:48

## 2023-01-01 RX ADMIN — TAMOXIFEN CITRATE 20 MG: 10 TABLET ORAL at 08:01

## 2023-01-01 RX ADMIN — SPIRONOLACTONE 25 MG: 25 TABLET ORAL at 08:57

## 2023-01-01 RX ADMIN — OLANZAPINE 5 MG: 5 TABLET, FILM COATED ORAL at 22:37

## 2023-01-01 RX ADMIN — UMECLIDINIUM 1 PUFF: 62.5 AEROSOL, POWDER ORAL at 08:57

## 2023-01-01 RX ADMIN — LOPERAMIDE HYDROCHLORIDE 2 MG: 2 CAPSULE ORAL at 10:12

## 2023-01-01 RX ADMIN — TAMOXIFEN CITRATE 20 MG: 10 TABLET ORAL at 09:33

## 2023-01-01 RX ADMIN — MIRABEGRON 25 MG: 25 TABLET, FILM COATED, EXTENDED RELEASE ORAL at 08:48

## 2023-01-01 RX ADMIN — TRAMADOL HYDROCHLORIDE 25 MG: 50 TABLET, COATED ORAL at 22:22

## 2023-01-01 RX ADMIN — IPRATROPIUM BROMIDE AND ALBUTEROL SULFATE 3 ML: .5; 3 SOLUTION RESPIRATORY (INHALATION) at 01:09

## 2023-01-01 RX ADMIN — FAMOTIDINE 20 MG: 20 TABLET ORAL at 20:30

## 2023-01-01 RX ADMIN — GABAPENTIN 600 MG: 600 TABLET, FILM COATED ORAL at 22:50

## 2023-01-01 RX ADMIN — MIRABEGRON 25 MG: 25 TABLET, FILM COATED, EXTENDED RELEASE ORAL at 08:40

## 2023-01-01 RX ADMIN — PREDNISONE 40 MG: 20 TABLET ORAL at 08:12

## 2023-01-01 RX ADMIN — METOPROLOL SUCCINATE 75 MG: 25 TABLET, EXTENDED RELEASE ORAL at 22:44

## 2023-01-01 RX ADMIN — ACETAMINOPHEN 650 MG: 325 TABLET ORAL at 00:37

## 2023-01-01 RX ADMIN — ALBUTEROL SULFATE 2 PUFF: 90 AEROSOL, METERED RESPIRATORY (INHALATION) at 21:09

## 2023-01-01 RX ADMIN — FLUTICASONE FUROATE AND VILANTEROL TRIFENATATE 1 PUFF: 200; 25 POWDER RESPIRATORY (INHALATION) at 20:44

## 2023-01-01 RX ADMIN — METOPROLOL SUCCINATE 50 MG: 50 TABLET, EXTENDED RELEASE ORAL at 22:11

## 2023-01-01 RX ADMIN — SPIRONOLACTONE 25 MG: 25 TABLET ORAL at 09:31

## 2023-01-01 RX ADMIN — OXYCODONE HYDROCHLORIDE 10 MG: 100 SOLUTION ORAL at 12:06

## 2023-01-01 RX ADMIN — BUMETANIDE 1 MG: 1 TABLET ORAL at 18:07

## 2023-01-01 RX ADMIN — FLUTICASONE FUROATE AND VILANTEROL TRIFENATATE 1 PUFF: 200; 25 POWDER RESPIRATORY (INHALATION) at 08:32

## 2023-01-01 RX ADMIN — LORAZEPAM 0.5 MG: 2 INJECTION INTRAMUSCULAR; INTRAVENOUS at 01:39

## 2023-01-01 RX ADMIN — BUMETANIDE 1 MG: 1 TABLET ORAL at 09:30

## 2023-01-01 RX ADMIN — HYDROMORPHONE HYDROCHLORIDE 0.5 MG: 1 INJECTION, SOLUTION INTRAMUSCULAR; INTRAVENOUS; SUBCUTANEOUS at 00:22

## 2023-01-01 RX ADMIN — TRAMADOL HYDROCHLORIDE 50 MG: 50 TABLET, COATED ORAL at 08:00

## 2023-01-01 RX ADMIN — AMOXICILLIN AND CLAVULANATE POTASSIUM 1 TABLET: 875; 125 TABLET, FILM COATED ORAL at 09:40

## 2023-01-01 RX ADMIN — Medication: at 13:57

## 2023-01-01 RX ADMIN — TRAMADOL HYDROCHLORIDE 50 MG: 50 TABLET, COATED ORAL at 16:19

## 2023-01-01 RX ADMIN — RIVAROXABAN 15 MG: 15 TABLET, FILM COATED ORAL at 21:48

## 2023-01-01 RX ADMIN — POTASSIUM CHLORIDE 10 MEQ: 10 TABLET, EXTENDED RELEASE ORAL at 08:06

## 2023-01-01 RX ADMIN — DOXYCYCLINE 100 MG: 100 CAPSULE ORAL at 08:06

## 2023-01-01 RX ADMIN — TOLTERODINE TARTRATE 2 MG: 2 CAPSULE, EXTENDED RELEASE ORAL at 09:30

## 2023-01-01 RX ADMIN — BENZONATATE 100 MG: 100 CAPSULE ORAL at 11:39

## 2023-01-01 RX ADMIN — UMECLIDINIUM 1 PUFF: 62.5 AEROSOL, POWDER ORAL at 08:44

## 2023-01-01 RX ADMIN — UMECLIDINIUM 1 PUFF: 62.5 AEROSOL, POWDER ORAL at 20:44

## 2023-01-01 RX ADMIN — AMOXICILLIN AND CLAVULANATE POTASSIUM 1 TABLET: 875; 125 TABLET, FILM COATED ORAL at 19:48

## 2023-01-01 RX ADMIN — METHYLPREDNISOLONE SODIUM SUCCINATE 125 MG: 125 INJECTION, POWDER, FOR SOLUTION INTRAMUSCULAR; INTRAVENOUS at 01:05

## 2023-01-01 RX ADMIN — GABAPENTIN 600 MG: 600 TABLET, FILM COATED ORAL at 20:01

## 2023-01-01 RX ADMIN — HYDROMORPHONE HYDROCHLORIDE 0.5 MG: 1 INJECTION, SOLUTION INTRAMUSCULAR; INTRAVENOUS; SUBCUTANEOUS at 20:26

## 2023-01-01 RX ADMIN — UMECLIDINIUM 1 PUFF: 62.5 AEROSOL, POWDER ORAL at 08:32

## 2023-01-01 RX ADMIN — FLUTICASONE FUROATE AND VILANTEROL TRIFENATATE 1 PUFF: 200; 25 POWDER RESPIRATORY (INHALATION) at 23:07

## 2023-01-01 RX ADMIN — GABAPENTIN 600 MG: 600 TABLET, FILM COATED ORAL at 21:37

## 2023-01-01 RX ADMIN — PIPERACILLIN AND TAZOBACTAM 3.38 G: 3; .375 INJECTION, POWDER, LYOPHILIZED, FOR SOLUTION INTRAVENOUS at 22:12

## 2023-01-01 RX ADMIN — ESCITALOPRAM OXALATE 10 MG: 10 TABLET ORAL at 09:08

## 2023-01-01 RX ADMIN — BENZONATATE 100 MG: 100 CAPSULE ORAL at 10:20

## 2023-01-01 RX ADMIN — HYDROXYZINE HYDROCHLORIDE 50 MG: 25 TABLET, FILM COATED ORAL at 04:30

## 2023-01-01 RX ADMIN — OXYCODONE HYDROCHLORIDE 5 MG: 5 TABLET ORAL at 11:48

## 2023-01-01 RX ADMIN — TRAMADOL HYDROCHLORIDE 50 MG: 50 TABLET, COATED ORAL at 09:21

## 2023-01-01 RX ADMIN — METOPROLOL SUCCINATE 50 MG: 50 TABLET, EXTENDED RELEASE ORAL at 21:29

## 2023-01-01 RX ADMIN — ALBUTEROL SULFATE 2 PUFF: 90 AEROSOL, METERED RESPIRATORY (INHALATION) at 19:38

## 2023-01-01 RX ADMIN — BISMUTH SUBSALICYLATE 15 ML: 525 LIQUID ORAL at 15:36

## 2023-01-01 RX ADMIN — GABAPENTIN 300 MG: 300 CAPSULE ORAL at 21:39

## 2023-01-01 RX ADMIN — LORAZEPAM 0.5 MG: 2 INJECTION INTRAMUSCULAR; INTRAVENOUS at 09:43

## 2023-01-01 RX ADMIN — GABAPENTIN 600 MG: 600 TABLET, FILM COATED ORAL at 22:28

## 2023-01-01 RX ADMIN — ATROPINE SULFATE 2 DROP: 10 SOLUTION/ DROPS OPHTHALMIC at 07:44

## 2023-01-01 RX ADMIN — OLANZAPINE 5 MG: 5 TABLET, FILM COATED ORAL at 20:49

## 2023-01-01 RX ADMIN — HYDROXYZINE HYDROCHLORIDE 25 MG: 25 TABLET, FILM COATED ORAL at 21:03

## 2023-01-01 RX ADMIN — GABAPENTIN 600 MG: 600 TABLET, FILM COATED ORAL at 22:11

## 2023-01-01 RX ADMIN — ACETYLCYSTEINE 4 ML: 200 SOLUTION ORAL; RESPIRATORY (INHALATION) at 07:46

## 2023-01-01 RX ADMIN — ATROPINE SULFATE 2 DROP: 10 SOLUTION/ DROPS OPHTHALMIC at 16:20

## 2023-01-01 RX ADMIN — SIMVASTATIN 40 MG: 20 TABLET, FILM COATED ORAL at 22:41

## 2023-01-01 RX ADMIN — HYDROMORPHONE HYDROCHLORIDE 0.5 MG: 1 INJECTION, SOLUTION INTRAMUSCULAR; INTRAVENOUS; SUBCUTANEOUS at 16:58

## 2023-01-01 RX ADMIN — BENZONATATE 100 MG: 100 CAPSULE ORAL at 11:41

## 2023-01-01 RX ADMIN — PROCHLORPERAZINE EDISYLATE 5 MG: 5 INJECTION INTRAMUSCULAR; INTRAVENOUS at 22:49

## 2023-01-01 RX ADMIN — GABAPENTIN 300 MG: 300 CAPSULE ORAL at 22:23

## 2023-01-01 RX ADMIN — TAMOXIFEN CITRATE 20 MG: 10 TABLET ORAL at 07:57

## 2023-01-01 RX ADMIN — SPIRONOLACTONE 12.5 MG: 25 TABLET ORAL at 20:26

## 2023-01-01 RX ADMIN — ACETAMINOPHEN 650 MG: 325 TABLET ORAL at 04:25

## 2023-01-01 RX ADMIN — ESCITALOPRAM OXALATE 10 MG: 10 TABLET ORAL at 09:53

## 2023-01-01 RX ADMIN — KETOROLAC TROMETHAMINE 15 MG: 15 INJECTION INTRAMUSCULAR; INTRAVENOUS at 18:48

## 2023-01-01 RX ADMIN — SIMVASTATIN 40 MG: 20 TABLET, FILM COATED ORAL at 22:28

## 2023-01-01 RX ADMIN — BENZONATATE 100 MG: 100 CAPSULE ORAL at 08:46

## 2023-01-01 RX ADMIN — SPIRONOLACTONE 25 MG: 25 TABLET ORAL at 08:46

## 2023-01-01 RX ADMIN — CYCLOBENZAPRINE HYDROCHLORIDE 2.5 MG: 5 TABLET, FILM COATED ORAL at 10:35

## 2023-01-01 RX ADMIN — SIMVASTATIN 40 MG: 20 TABLET, FILM COATED ORAL at 21:42

## 2023-01-01 RX ADMIN — PIPERACILLIN AND TAZOBACTAM 3.38 G: 3; .375 INJECTION, POWDER, LYOPHILIZED, FOR SOLUTION INTRAVENOUS at 22:22

## 2023-01-01 RX ADMIN — METOPROLOL SUCCINATE 75 MG: 25 TABLET, EXTENDED RELEASE ORAL at 22:23

## 2023-01-01 RX ADMIN — Medication 325 MG: at 13:14

## 2023-01-01 RX ADMIN — SPIRONOLACTONE 12.5 MG: 25 TABLET ORAL at 08:12

## 2023-01-01 RX ADMIN — TRAMADOL HYDROCHLORIDE 50 MG: 50 TABLET, COATED ORAL at 12:03

## 2023-01-01 RX ADMIN — FENTANYL CITRATE 50 MCG: 50 INJECTION, SOLUTION INTRAMUSCULAR; INTRAVENOUS at 22:35

## 2023-01-01 RX ADMIN — METOPROLOL SUCCINATE 50 MG: 50 TABLET, EXTENDED RELEASE ORAL at 21:01

## 2023-01-01 RX ADMIN — PREDNISONE 40 MG: 20 TABLET ORAL at 08:46

## 2023-01-01 RX ADMIN — OXYCODONE HYDROCHLORIDE 10 MG: 100 SOLUTION ORAL at 00:27

## 2023-01-01 RX ADMIN — RIVAROXABAN 15 MG: 15 TABLET, FILM COATED ORAL at 21:23

## 2023-01-01 RX ADMIN — PIPERACILLIN AND TAZOBACTAM 3.38 G: 3; .375 INJECTION, POWDER, LYOPHILIZED, FOR SOLUTION INTRAVENOUS at 15:29

## 2023-01-01 RX ADMIN — FLUTICASONE FUROATE AND VILANTEROL TRIFENATATE 1 PUFF: 200; 25 POWDER RESPIRATORY (INHALATION) at 08:58

## 2023-01-01 RX ADMIN — RIVAROXABAN 15 MG: 15 TABLET, FILM COATED ORAL at 22:40

## 2023-01-01 RX ADMIN — ACETAMINOPHEN 975 MG: 325 TABLET ORAL at 14:01

## 2023-01-01 RX ADMIN — HYDROMORPHONE HYDROCHLORIDE 0.5 MG: 1 INJECTION, SOLUTION INTRAMUSCULAR; INTRAVENOUS; SUBCUTANEOUS at 22:00

## 2023-01-01 RX ADMIN — TRAMADOL HYDROCHLORIDE 50 MG: 50 TABLET, COATED ORAL at 07:41

## 2023-01-01 RX ADMIN — IOPAMIDOL 75 ML: 755 INJECTION, SOLUTION INTRAVENOUS at 23:13

## 2023-01-01 RX ADMIN — AMOXICILLIN AND CLAVULANATE POTASSIUM 1 TABLET: 875; 125 TABLET, FILM COATED ORAL at 20:53

## 2023-01-01 RX ADMIN — TRAMADOL HYDROCHLORIDE 50 MG: 50 TABLET, COATED ORAL at 09:03

## 2023-01-01 RX ADMIN — TAMOXIFEN CITRATE 20 MG: 10 TABLET ORAL at 08:43

## 2023-01-01 RX ADMIN — ALBUTEROL SULFATE 2 PUFF: 90 AEROSOL, METERED RESPIRATORY (INHALATION) at 05:31

## 2023-01-01 RX ADMIN — SPIRONOLACTONE 12.5 MG: 25 TABLET ORAL at 22:24

## 2023-01-01 RX ADMIN — PREDNISONE 40 MG: 20 TABLET ORAL at 09:30

## 2023-01-01 RX ADMIN — TRAMADOL HYDROCHLORIDE 50 MG: 50 TABLET, COATED ORAL at 22:28

## 2023-01-01 RX ADMIN — HYDROMORPHONE HYDROCHLORIDE 0.5 MG: 1 INJECTION, SOLUTION INTRAMUSCULAR; INTRAVENOUS; SUBCUTANEOUS at 07:46

## 2023-01-01 RX ADMIN — ACETYLCYSTEINE 4 ML: 100 INHALANT RESPIRATORY (INHALATION) at 03:59

## 2023-01-01 RX ADMIN — AMOXICILLIN AND CLAVULANATE POTASSIUM 1 TABLET: 500; 125 TABLET, FILM COATED ORAL at 21:00

## 2023-01-01 RX ADMIN — TRAMADOL HYDROCHLORIDE 50 MG: 50 TABLET, COATED ORAL at 21:21

## 2023-01-01 RX ADMIN — OLANZAPINE 5 MG: 5 TABLET, FILM COATED ORAL at 21:42

## 2023-01-01 RX ADMIN — HYDROMORPHONE HYDROCHLORIDE 2 MG: 2 TABLET ORAL at 17:03

## 2023-01-01 RX ADMIN — TAMOXIFEN CITRATE 20 MG: 10 TABLET ORAL at 10:46

## 2023-01-01 RX ADMIN — FUROSEMIDE 20 MG: 20 TABLET ORAL at 08:58

## 2023-01-01 RX ADMIN — METOPROLOL SUCCINATE 75 MG: 50 TABLET, EXTENDED RELEASE ORAL at 21:47

## 2023-01-01 RX ADMIN — ACETAMINOPHEN 650 MG: 325 TABLET ORAL at 18:48

## 2023-01-01 RX ADMIN — OLANZAPINE 5 MG: 5 TABLET, FILM COATED ORAL at 22:28

## 2023-01-01 RX ADMIN — PREDNISONE 20 MG: 20 TABLET ORAL at 08:47

## 2023-01-01 RX ADMIN — ATROPINE SULFATE 2 DROP: 10 SOLUTION/ DROPS OPHTHALMIC at 18:06

## 2023-01-01 RX ADMIN — IPRATROPIUM BROMIDE AND ALBUTEROL SULFATE 3 ML: .5; 3 SOLUTION RESPIRATORY (INHALATION) at 20:15

## 2023-01-01 RX ADMIN — CALCIUM CARBONATE (ANTACID) CHEW TAB 500 MG 500 MG: 500 CHEW TAB at 19:42

## 2023-01-01 RX ADMIN — TAMOXIFEN CITRATE 20 MG: 10 TABLET ORAL at 16:14

## 2023-01-01 RX ADMIN — UMECLIDINIUM 1 PUFF: 62.5 AEROSOL, POWDER ORAL at 07:43

## 2023-01-01 RX ADMIN — HYDROMORPHONE HYDROCHLORIDE 0.5 MG: 1 INJECTION, SOLUTION INTRAMUSCULAR; INTRAVENOUS; SUBCUTANEOUS at 03:17

## 2023-01-01 RX ADMIN — SIMVASTATIN 40 MG: 20 TABLET, FILM COATED ORAL at 23:07

## 2023-01-01 RX ADMIN — OLANZAPINE 5 MG: 5 TABLET, FILM COATED ORAL at 23:48

## 2023-01-01 RX ADMIN — METOPROLOL SUCCINATE 50 MG: 50 TABLET, EXTENDED RELEASE ORAL at 21:03

## 2023-01-01 RX ADMIN — ACETYLCYSTEINE 4 ML: 100 INHALANT RESPIRATORY (INHALATION) at 00:01

## 2023-01-01 RX ADMIN — SIMVASTATIN 40 MG: 20 TABLET, FILM COATED ORAL at 21:26

## 2023-01-01 RX ADMIN — VANCOMYCIN HYDROCHLORIDE 500 MG: 500 INJECTION, POWDER, LYOPHILIZED, FOR SOLUTION INTRAVENOUS at 08:57

## 2023-01-01 RX ADMIN — SPIRONOLACTONE 12.5 MG: 25 TABLET ORAL at 10:46

## 2023-01-01 RX ADMIN — OXYCODONE HYDROCHLORIDE 10 MG: 100 SOLUTION ORAL at 13:30

## 2023-01-01 RX ADMIN — LORAZEPAM 1 MG: 2 INJECTION INTRAMUSCULAR; INTRAVENOUS at 12:48

## 2023-01-01 RX ADMIN — GADOBUTROL 5 ML: 604.72 INJECTION INTRAVENOUS at 11:25

## 2023-01-01 RX ADMIN — RIVAROXABAN 15 MG: 15 TABLET, FILM COATED ORAL at 22:23

## 2023-01-01 RX ADMIN — ACETYLCYSTEINE 4 ML: 100 INHALANT RESPIRATORY (INHALATION) at 07:51

## 2023-01-01 RX ADMIN — IPRATROPIUM BROMIDE AND ALBUTEROL SULFATE 3 ML: .5; 3 SOLUTION RESPIRATORY (INHALATION) at 08:27

## 2023-01-01 RX ADMIN — ACETAMINOPHEN 975 MG: 325 TABLET ORAL at 20:51

## 2023-01-01 RX ADMIN — GABAPENTIN 600 MG: 600 TABLET, FILM COATED ORAL at 21:03

## 2023-01-01 RX ADMIN — OLANZAPINE 5 MG: 5 TABLET, FILM COATED ORAL at 22:22

## 2023-01-01 RX ADMIN — PERFLUTREN 2 ML: 6.52 INJECTION, SUSPENSION INTRAVENOUS at 13:41

## 2023-01-01 RX ADMIN — ACETAMINOPHEN 975 MG: 325 TABLET ORAL at 12:27

## 2023-01-01 RX ADMIN — RIVAROXABAN 15 MG: 15 TABLET, FILM COATED ORAL at 22:36

## 2023-01-01 RX ADMIN — BUMETANIDE 1 MG: 1 TABLET ORAL at 18:00

## 2023-01-01 RX ADMIN — ACETAMINOPHEN 975 MG: 325 TABLET ORAL at 13:41

## 2023-01-01 RX ADMIN — OLANZAPINE 5 MG: 5 TABLET, FILM COATED ORAL at 21:21

## 2023-01-01 RX ADMIN — LORAZEPAM 0.5 MG: 2 INJECTION INTRAMUSCULAR; INTRAVENOUS at 10:45

## 2023-01-01 RX ADMIN — FLUTICASONE FUROATE AND VILANTEROL TRIFENATATE 1 PUFF: 200; 25 POWDER RESPIRATORY (INHALATION) at 22:46

## 2023-01-01 RX ADMIN — FLUTICASONE FUROATE AND VILANTEROL TRIFENATATE 1 PUFF: 200; 25 POWDER RESPIRATORY (INHALATION) at 21:51

## 2023-01-01 RX ADMIN — ESCITALOPRAM OXALATE 10 MG: 10 TABLET ORAL at 12:18

## 2023-01-01 RX ADMIN — HYDROXYZINE HYDROCHLORIDE 25 MG: 25 TABLET, FILM COATED ORAL at 15:17

## 2023-01-01 RX ADMIN — UMECLIDINIUM 1 PUFF: 62.5 AEROSOL, POWDER ORAL at 08:45

## 2023-01-01 RX ADMIN — FAMOTIDINE 20 MG: 20 TABLET ORAL at 20:51

## 2023-01-01 RX ADMIN — TAMOXIFEN CITRATE 20 MG: 10 TABLET ORAL at 09:09

## 2023-01-01 RX ADMIN — ESCITALOPRAM OXALATE 10 MG: 10 TABLET ORAL at 09:48

## 2023-01-01 RX ADMIN — IPRATROPIUM BROMIDE AND ALBUTEROL SULFATE 3 ML: .5; 3 SOLUTION RESPIRATORY (INHALATION) at 16:47

## 2023-01-01 RX ADMIN — VANCOMYCIN HYDROCHLORIDE 1000 MG: 1 INJECTION, SOLUTION INTRAVENOUS at 00:52

## 2023-01-01 RX ADMIN — DOXYCYCLINE 100 MG: 100 CAPSULE ORAL at 08:01

## 2023-01-01 RX ADMIN — HYDROMORPHONE HYDROCHLORIDE 0.3 MG: 1 INJECTION, SOLUTION INTRAMUSCULAR; INTRAVENOUS; SUBCUTANEOUS at 06:53

## 2023-01-01 RX ADMIN — MIRABEGRON 25 MG: 25 TABLET, FILM COATED, EXTENDED RELEASE ORAL at 10:46

## 2023-01-01 RX ADMIN — FAMOTIDINE 20 MG: 20 TABLET ORAL at 22:28

## 2023-01-01 RX ADMIN — SIMVASTATIN 40 MG: 20 TABLET, FILM COATED ORAL at 21:52

## 2023-01-01 RX ADMIN — ESCITALOPRAM OXALATE 10 MG: 10 TABLET ORAL at 08:40

## 2023-01-01 RX ADMIN — HYDROMORPHONE HYDROCHLORIDE 1 MG: 2 TABLET ORAL at 18:21

## 2023-01-01 RX ADMIN — OLANZAPINE 5 MG: 5 TABLET, FILM COATED ORAL at 22:10

## 2023-01-01 RX ADMIN — FUROSEMIDE 80 MG: 10 INJECTION, SOLUTION INTRAMUSCULAR; INTRAVENOUS at 08:13

## 2023-01-01 RX ADMIN — MIRABEGRON 25 MG: 25 TABLET, FILM COATED, EXTENDED RELEASE ORAL at 07:59

## 2023-01-01 RX ADMIN — ESCITALOPRAM OXALATE 10 MG: 10 TABLET ORAL at 09:21

## 2023-01-01 RX ADMIN — RIVAROXABAN 15 MG: 15 TABLET, FILM COATED ORAL at 21:03

## 2023-01-01 ASSESSMENT — ACTIVITIES OF DAILY LIVING (ADL)
ADLS_ACUITY_SCORE: 35
ADLS_ACUITY_SCORE: 28
ADLS_ACUITY_SCORE: 28
ADLS_ACUITY_SCORE: 32
TOILETING_ISSUES: NO
ADLS_ACUITY_SCORE: 35
ADLS_ACUITY_SCORE: 32
ADLS_ACUITY_SCORE: 32
ADLS_ACUITY_SCORE: 30
ADLS_ACUITY_SCORE: 43
ADLS_ACUITY_SCORE: 32
ADLS_ACUITY_SCORE: 43
ADLS_ACUITY_SCORE: 49
ADLS_ACUITY_SCORE: 53
ADLS_ACUITY_SCORE: 32
ADLS_ACUITY_SCORE: 28
ADLS_ACUITY_SCORE: 45
ADLS_ACUITY_SCORE: 32
ADLS_ACUITY_SCORE: 35
ADLS_ACUITY_SCORE: 37
ADLS_ACUITY_SCORE: 36
ADLS_ACUITY_SCORE: 30
ADLS_ACUITY_SCORE: 32
ADLS_ACUITY_SCORE: 41
ADLS_ACUITY_SCORE: 31
ADLS_ACUITY_SCORE: 36
ADLS_ACUITY_SCORE: 45
ADLS_ACUITY_SCORE: 22
ADLS_ACUITY_SCORE: 41
ADLS_ACUITY_SCORE: 45
ADLS_ACUITY_SCORE: 26
ADLS_ACUITY_SCORE: 30
WALKING_OR_CLIMBING_STAIRS_DIFFICULTY: YES
TRANSFERRING: 1-->ASSISTANCE (EQUIPMENT/PERSON) NEEDED
ADLS_ACUITY_SCORE: 28
ADLS_ACUITY_SCORE: 32
ADLS_ACUITY_SCORE: 37
ADLS_ACUITY_SCORE: 32
ADLS_ACUITY_SCORE: 28
ADLS_ACUITY_SCORE: 37
ADLS_ACUITY_SCORE: 39
ADLS_ACUITY_SCORE: 30
ADLS_ACUITY_SCORE: 28
ADLS_ACUITY_SCORE: 43
ADLS_ACUITY_SCORE: 30
ADLS_ACUITY_SCORE: 32
ADLS_ACUITY_SCORE: 30
ADLS_ACUITY_SCORE: 32
ADLS_ACUITY_SCORE: 24
ADLS_ACUITY_SCORE: 28
ADLS_ACUITY_SCORE: 34
ADLS_ACUITY_SCORE: 32
ADLS_ACUITY_SCORE: 49
DOING_ERRANDS_INDEPENDENTLY_DIFFICULTY: YES
ADLS_ACUITY_SCORE: 35
ADLS_ACUITY_SCORE: 28
ADLS_ACUITY_SCORE: 34
ADLS_ACUITY_SCORE: 30
ADLS_ACUITY_SCORE: 35
DRESSING/BATHING: BATHING DIFFICULTY, ASSISTANCE 1 PERSON;BATHING DIFFICULTY, REQUIRES EQUIPMENT
ADLS_ACUITY_SCORE: 38
ADLS_ACUITY_SCORE: 32
ADLS_ACUITY_SCORE: 53
ADLS_ACUITY_SCORE: 35
ADLS_ACUITY_SCORE: 26
ADLS_ACUITY_SCORE: 36
ADLS_ACUITY_SCORE: 32
ADLS_ACUITY_SCORE: 35
ADLS_ACUITY_SCORE: 49
ADLS_ACUITY_SCORE: 26
ADLS_ACUITY_SCORE: 32
ADLS_ACUITY_SCORE: 28
DRESS: 0-->INDEPENDENT
ADLS_ACUITY_SCORE: 28
ADLS_ACUITY_SCORE: 28
ADLS_ACUITY_SCORE: 49
ADLS_ACUITY_SCORE: 32
FALL_HISTORY_WITHIN_LAST_SIX_MONTHS: NO
ADLS_ACUITY_SCORE: 32
ADLS_ACUITY_SCORE: 41
ADLS_ACUITY_SCORE: 47
ADLS_ACUITY_SCORE: 28
ADLS_ACUITY_SCORE: 41
ADLS_ACUITY_SCORE: 28
ADLS_ACUITY_SCORE: 35
ADLS_ACUITY_SCORE: 32
ADLS_ACUITY_SCORE: 22
ADLS_ACUITY_SCORE: 47
ADLS_ACUITY_SCORE: 32
DIFFICULTY_EATING/SWALLOWING: NO
DRESSING/BATHING: BATHING DIFFICULTY, ASSISTANCE 1 PERSON;BATHING DIFFICULTY, REQUIRES EQUIPMENT
ADLS_ACUITY_SCORE: 30
ADLS_ACUITY_SCORE: 32
ADLS_ACUITY_SCORE: 35
ADLS_ACUITY_SCORE: 32
ADLS_ACUITY_SCORE: 28
ADLS_ACUITY_SCORE: 37
ADLS_ACUITY_SCORE: 37
ADLS_ACUITY_SCORE: 32
ADLS_ACUITY_SCORE: 22
ADLS_ACUITY_SCORE: 37
ADLS_ACUITY_SCORE: 43
DEPENDENT_IADLS:: INDEPENDENT
ADLS_ACUITY_SCORE: 28
ADLS_ACUITY_SCORE: 37
DRESSING/BATHING_DIFFICULTY: YES
ADLS_ACUITY_SCORE: 43
ADLS_ACUITY_SCORE: 32
ADLS_ACUITY_SCORE: 28
ADLS_ACUITY_SCORE: 53
ADLS_ACUITY_SCORE: 32
ADLS_ACUITY_SCORE: 30
ADLS_ACUITY_SCORE: 35
ADLS_ACUITY_SCORE: 35
WEAR_GLASSES_OR_BLIND: NO
DIFFICULTY_EATING/SWALLOWING: NO
ADLS_ACUITY_SCORE: 35
ADLS_ACUITY_SCORE: 32
ADLS_ACUITY_SCORE: 24
ADLS_ACUITY_SCORE: 32
DRESS: 0-->INDEPENDENT
ADLS_ACUITY_SCORE: 32
ADLS_ACUITY_SCORE: 28
ADLS_ACUITY_SCORE: 43
ADLS_ACUITY_SCORE: 35
ADLS_ACUITY_SCORE: 29
ADLS_ACUITY_SCORE: 24
ADLS_ACUITY_SCORE: 35
ADLS_ACUITY_SCORE: 47
ADLS_ACUITY_SCORE: 30
ADLS_ACUITY_SCORE: 24
ADLS_ACUITY_SCORE: 28
ADLS_ACUITY_SCORE: 43
ADLS_ACUITY_SCORE: 32
TOILETING: 1-->ASSISTANCE (EQUIPMENT/PERSON) NEEDED (NOT DEVELOPMENTALLY APPROPRIATE)
ADLS_ACUITY_SCORE: 35
ADLS_ACUITY_SCORE: 35
ADLS_ACUITY_SCORE: 38
ADLS_ACUITY_SCORE: 34
ADLS_ACUITY_SCORE: 34
ADLS_ACUITY_SCORE: 32
ADLS_ACUITY_SCORE: 24
ADLS_ACUITY_SCORE: 32
ADLS_ACUITY_SCORE: 38
ADLS_ACUITY_SCORE: 28
ADLS_ACUITY_SCORE: 28
WALKING_OR_CLIMBING_STAIRS_DIFFICULTY: NO
ADLS_ACUITY_SCORE: 31
WALKING_OR_CLIMBING_STAIRS: AMBULATION DIFFICULTY, REQUIRES EQUIPMENT;AMBULATION DIFFICULTY, ASSISTANCE 1 PERSON
ADLS_ACUITY_SCORE: 34
ADLS_ACUITY_SCORE: 26
ADLS_ACUITY_SCORE: 28
ADLS_ACUITY_SCORE: 43
ADLS_ACUITY_SCORE: 28
ADLS_ACUITY_SCORE: 32
WALKING_OR_CLIMBING_STAIRS: AMBULATION DIFFICULTY, REQUIRES EQUIPMENT;AMBULATION DIFFICULTY, ASSISTANCE 1 PERSON
ADLS_ACUITY_SCORE: 35
TOILETING_ASSISTANCE: TOILETING DIFFICULTY, REQUIRES EQUIPMENT
ADLS_ACUITY_SCORE: 45
ADLS_ACUITY_SCORE: 45
ADLS_ACUITY_SCORE: 36
WEAR_GLASSES_OR_BLIND: NO
ADLS_ACUITY_SCORE: 47
ADLS_ACUITY_SCORE: 35
ADLS_ACUITY_SCORE: 41
ADLS_ACUITY_SCORE: 47
ADLS_ACUITY_SCORE: 32
ADLS_ACUITY_SCORE: 43
ADLS_ACUITY_SCORE: 32
ADLS_ACUITY_SCORE: 34
ADLS_ACUITY_SCORE: 30
ADLS_ACUITY_SCORE: 28
ADLS_ACUITY_SCORE: 49
ADLS_ACUITY_SCORE: 43
ADLS_ACUITY_SCORE: 41
ADLS_ACUITY_SCORE: 32
ADLS_ACUITY_SCORE: 30
ADLS_ACUITY_SCORE: 32
ADLS_ACUITY_SCORE: 24
ADLS_ACUITY_SCORE: 27
ADLS_ACUITY_SCORE: 37
ADLS_ACUITY_SCORE: 32
ADLS_ACUITY_SCORE: 31
ADLS_ACUITY_SCORE: 32
ADLS_ACUITY_SCORE: 41
CHANGE_IN_FUNCTIONAL_STATUS_SINCE_ONSET_OF_CURRENT_ILLNESS/INJURY: NO
ADLS_ACUITY_SCORE: 41
BATHING: 1-->ASSISTANCE NEEDED
ADLS_ACUITY_SCORE: 35
FALL_HISTORY_WITHIN_LAST_SIX_MONTHS: NO
ADLS_ACUITY_SCORE: 38
ADLS_ACUITY_SCORE: 32
ADLS_ACUITY_SCORE: 32
ADLS_ACUITY_SCORE: 28
ADLS_ACUITY_SCORE: 32
ADLS_ACUITY_SCORE: 41
ADLS_ACUITY_SCORE: 43
ADLS_ACUITY_SCORE: 37
ADLS_ACUITY_SCORE: 29
ADLS_ACUITY_SCORE: 28
ADLS_ACUITY_SCORE: 32
ADLS_ACUITY_SCORE: 32
DOING_ERRANDS_INDEPENDENTLY_DIFFICULTY: YES
ADLS_ACUITY_SCORE: 32
ADLS_ACUITY_SCORE: 32
ADLS_ACUITY_SCORE: 35
ADLS_ACUITY_SCORE: 43
ADLS_ACUITY_SCORE: 53
ADLS_ACUITY_SCORE: 32
ADLS_ACUITY_SCORE: 32
ADLS_ACUITY_SCORE: 35
ADLS_ACUITY_SCORE: 43
DRESSING/BATHING_DIFFICULTY: YES
ADLS_ACUITY_SCORE: 37
ADLS_ACUITY_SCORE: 30
ADLS_ACUITY_SCORE: 35
ADLS_ACUITY_SCORE: 28
ADLS_ACUITY_SCORE: 32
ADLS_ACUITY_SCORE: 28
ADLS_ACUITY_SCORE: 45
ADLS_ACUITY_SCORE: 45
ADLS_ACUITY_SCORE: 32
EQUIPMENT_CURRENTLY_USED_AT_HOME: CANE, STRAIGHT
ADLS_ACUITY_SCORE: 29
ADLS_ACUITY_SCORE: 35
ADLS_ACUITY_SCORE: 45
ADLS_ACUITY_SCORE: 32
WALKING_OR_CLIMBING_STAIRS_DIFFICULTY: YES
ADLS_ACUITY_SCORE: 49
ADLS_ACUITY_SCORE: 37
ADLS_ACUITY_SCORE: 34
ADLS_ACUITY_SCORE: 35
ADLS_ACUITY_SCORE: 35
ADLS_ACUITY_SCORE: 28
ADLS_ACUITY_SCORE: 28
ADLS_ACUITY_SCORE: 43
CONCENTRATING,_REMEMBERING_OR_MAKING_DECISIONS_DIFFICULTY: NO
ADLS_ACUITY_SCORE: 32
ADLS_ACUITY_SCORE: 28
ADLS_ACUITY_SCORE: 34
ADLS_ACUITY_SCORE: 30
ADLS_ACUITY_SCORE: 31
ADLS_ACUITY_SCORE: 43
ADLS_ACUITY_SCORE: 20
ADLS_ACUITY_SCORE: 32
DOING_ERRANDS_INDEPENDENTLY_DIFFICULTY: YES
ADLS_ACUITY_SCORE: 32
TOILETING: 1-->ASSISTANCE (EQUIPMENT/PERSON) NEEDED
ADLS_ACUITY_SCORE: 34
ADLS_ACUITY_SCORE: 28
ADLS_ACUITY_SCORE: 30
ADLS_ACUITY_SCORE: 37
ADLS_ACUITY_SCORE: 43
TOILETING_ISSUES: NO
ADLS_ACUITY_SCORE: 47
ADLS_ACUITY_SCORE: 32
DEPENDENT_IADLS:: COOKING;CLEANING;LAUNDRY;SHOPPING;MEAL PREPARATION;TRANSPORTATION
ADLS_ACUITY_SCORE: 27
ADLS_ACUITY_SCORE: 28
ADLS_ACUITY_SCORE: 38
ADLS_ACUITY_SCORE: 32
ADLS_ACUITY_SCORE: 47
ADLS_ACUITY_SCORE: 28
ADLS_ACUITY_SCORE: 49
ADLS_ACUITY_SCORE: 30
ADLS_ACUITY_SCORE: 30
ADLS_ACUITY_SCORE: 35
ADLS_ACUITY_SCORE: 24
ADLS_ACUITY_SCORE: 32
ADLS_ACUITY_SCORE: 35
ADLS_ACUITY_SCORE: 38
DRESSING/BATHING_DIFFICULTY: NO
ADLS_ACUITY_SCORE: 35
ADLS_ACUITY_SCORE: 35
ADLS_ACUITY_SCORE: 32
ADLS_ACUITY_SCORE: 28
ADLS_ACUITY_SCORE: 36
ADLS_ACUITY_SCORE: 49
ADLS_ACUITY_SCORE: 24
EQUIPMENT_CURRENTLY_USED_AT_HOME: CANE, STRAIGHT
ADLS_ACUITY_SCORE: 32
DIFFICULTY_EATING/SWALLOWING: NO
ADLS_ACUITY_SCORE: 30
ADLS_ACUITY_SCORE: 28
CONCENTRATING,_REMEMBERING_OR_MAKING_DECISIONS_DIFFICULTY: NO
ADLS_ACUITY_SCORE: 43
ADLS_ACUITY_SCORE: 28
TRANSFERRING: 0-->ASSISTANCE NEEDED (DEVELOPMENTALLY APPROPRIATE)
ADLS_ACUITY_SCORE: 35
ADLS_ACUITY_SCORE: 30
ADLS_ACUITY_SCORE: 28
ADLS_ACUITY_SCORE: 32
ADLS_ACUITY_SCORE: 49
ADLS_ACUITY_SCORE: 32
ADLS_ACUITY_SCORE: 32
ADLS_ACUITY_SCORE: 24
ADLS_ACUITY_SCORE: 30
ADLS_ACUITY_SCORE: 30
ADLS_ACUITY_SCORE: 28
ADLS_ACUITY_SCORE: 28
ADLS_ACUITY_SCORE: 35
ADLS_ACUITY_SCORE: 43
ADLS_ACUITY_SCORE: 24
ADLS_ACUITY_SCORE: 43
ADLS_ACUITY_SCORE: 28
ADLS_ACUITY_SCORE: 41
ADLS_ACUITY_SCORE: 37
ADLS_ACUITY_SCORE: 43
TOILETING_ISSUES: YES
ADLS_ACUITY_SCORE: 35
ADLS_ACUITY_SCORE: 33
ADLS_ACUITY_SCORE: 26
ADLS_ACUITY_SCORE: 30
ADLS_ACUITY_SCORE: 35
ADLS_ACUITY_SCORE: 32
ADLS_ACUITY_SCORE: 35
ADLS_ACUITY_SCORE: 27
ADLS_ACUITY_SCORE: 34
ADLS_ACUITY_SCORE: 41
ADLS_ACUITY_SCORE: 45
ADLS_ACUITY_SCORE: 32
ADLS_ACUITY_SCORE: 32
ADLS_ACUITY_SCORE: 29
ADLS_ACUITY_SCORE: 32
ADLS_ACUITY_SCORE: 32
ADLS_ACUITY_SCORE: 47
ADLS_ACUITY_SCORE: 28
ADLS_ACUITY_SCORE: 53
ADLS_ACUITY_SCORE: 41
ADLS_ACUITY_SCORE: 27
ADLS_ACUITY_SCORE: 24
ADLS_ACUITY_SCORE: 30
ADLS_ACUITY_SCORE: 32
ADLS_ACUITY_SCORE: 39
ADLS_ACUITY_SCORE: 41
ADLS_ACUITY_SCORE: 43
CHANGE_IN_FUNCTIONAL_STATUS_SINCE_ONSET_OF_CURRENT_ILLNESS/INJURY: NO
ADLS_ACUITY_SCORE: 32
ADLS_ACUITY_SCORE: 35
ADLS_ACUITY_SCORE: 32
ADLS_ACUITY_SCORE: 43
ADLS_ACUITY_SCORE: 32
ADLS_ACUITY_SCORE: 37
ADLS_ACUITY_SCORE: 38
IADL_COMMENTS: SPOUSE CAN ASSIST AS NEEDED
ADLS_ACUITY_SCORE: 32
ADLS_ACUITY_SCORE: 32
ADLS_ACUITY_SCORE: 31
ADLS_ACUITY_SCORE: 43
ADLS_ACUITY_SCORE: 31
ADLS_ACUITY_SCORE: 32
ADLS_ACUITY_SCORE: 28
ADLS_ACUITY_SCORE: 32
ADLS_ACUITY_SCORE: 47
ADLS_ACUITY_SCORE: 31
ADLS_ACUITY_SCORE: 35
ADLS_ACUITY_SCORE: 30
ADLS_ACUITY_SCORE: 29
ADLS_ACUITY_SCORE: 29
ADLS_ACUITY_SCORE: 45
ADLS_ACUITY_SCORE: 32
ADLS_ACUITY_SCORE: 37
ADLS_ACUITY_SCORE: 45
ADLS_ACUITY_SCORE: 49
ADLS_ACUITY_SCORE: 41
ADLS_ACUITY_SCORE: 35
ADLS_ACUITY_SCORE: 30
ADLS_ACUITY_SCORE: 32
ADLS_ACUITY_SCORE: 35
ADLS_ACUITY_SCORE: 28
ADLS_ACUITY_SCORE: 35
ADLS_ACUITY_SCORE: 37
ADLS_ACUITY_SCORE: 32
ADLS_ACUITY_SCORE: 32
ADLS_ACUITY_SCORE: 47
ADLS_ACUITY_SCORE: 36
ADLS_ACUITY_SCORE: 28
ADLS_ACUITY_SCORE: 34
ADLS_ACUITY_SCORE: 32
ADLS_ACUITY_SCORE: 28
ADLS_ACUITY_SCORE: 32
ADLS_ACUITY_SCORE: 38
ADLS_ACUITY_SCORE: 30
ADLS_ACUITY_SCORE: 24
ADLS_ACUITY_SCORE: 43
DEPENDENT_IADLS:: CLEANING;COOKING;LAUNDRY;SHOPPING;MEAL PREPARATION;MEDICATION MANAGEMENT;MONEY MANAGEMENT;TRANSPORTATION
ADLS_ACUITY_SCORE: 28
ADLS_ACUITY_SCORE: 28
ADLS_ACUITY_SCORE: 37
ADLS_ACUITY_SCORE: 43
ADLS_ACUITY_SCORE: 32
ADLS_ACUITY_SCORE: 28
ADLS_ACUITY_SCORE: 31
ADLS_ACUITY_SCORE: 32
ADLS_ACUITY_SCORE: 32
ADLS_ACUITY_SCORE: 28
ADLS_ACUITY_SCORE: 37
ADLS_ACUITY_SCORE: 28
ADLS_ACUITY_SCORE: 28
ADLS_ACUITY_SCORE: 32
ADLS_ACUITY_SCORE: 34
CONCENTRATING,_REMEMBERING_OR_MAKING_DECISIONS_DIFFICULTY: NO
ADLS_ACUITY_SCORE: 37
ADLS_ACUITY_SCORE: 45
ADLS_ACUITY_SCORE: 41
ADLS_ACUITY_SCORE: 37
ADLS_ACUITY_SCORE: 30
ADLS_ACUITY_SCORE: 34
ADLS_ACUITY_SCORE: 32
ADLS_ACUITY_SCORE: 32
ADLS_ACUITY_SCORE: 28
ADLS_ACUITY_SCORE: 36
ADLS_ACUITY_SCORE: 41
ADLS_ACUITY_SCORE: 31
ADLS_ACUITY_SCORE: 47
ADLS_ACUITY_SCORE: 32
ADLS_ACUITY_SCORE: 28
ADLS_ACUITY_SCORE: 43
ADLS_ACUITY_SCORE: 27
ADLS_ACUITY_SCORE: 43
ADLS_ACUITY_SCORE: 24
ADLS_ACUITY_SCORE: 32
ADLS_ACUITY_SCORE: 35
ADLS_ACUITY_SCORE: 32
ADLS_ACUITY_SCORE: 32
ADLS_ACUITY_SCORE: 37
ADLS_ACUITY_SCORE: 53
ADLS_ACUITY_SCORE: 32
ADLS_ACUITY_SCORE: 28
ADLS_ACUITY_SCORE: 32
WEAR_GLASSES_OR_BLIND: NO
ADLS_ACUITY_SCORE: 32

## 2023-01-01 ASSESSMENT — PAIN SCALES - GENERAL
PAINLEVEL: NO PAIN (0)
PAINLEVEL: SEVERE PAIN (7)
PAINLEVEL: EXTREME PAIN (9)
PAINLEVEL: MILD PAIN (2)

## 2023-01-01 ASSESSMENT — ENCOUNTER SYMPTOMS
COUGH: 1
SHORTNESS OF BREATH: 1
COUGH: 1
FEVER: 0

## 2023-01-01 ASSESSMENT — PATIENT HEALTH QUESTIONNAIRE - PHQ9
SUM OF ALL RESPONSES TO PHQ QUESTIONS 1-9: 16
10. IF YOU CHECKED OFF ANY PROBLEMS, HOW DIFFICULT HAVE THESE PROBLEMS MADE IT FOR YOU TO DO YOUR WORK, TAKE CARE OF THINGS AT HOME, OR GET ALONG WITH OTHER PEOPLE: VERY DIFFICULT
SUM OF ALL RESPONSES TO PHQ QUESTIONS 1-9: 16

## 2023-01-05 NOTE — PROGRESS NOTES
"    Assessment & Plan     Chronic low back pain    Patient Instructions   You need to go back and see Dr. Aparicio at [facility].  You last saw him in January 2021.  At that time, he recommended that you have a vertebroplasty for your compression fracture.  You never ended up having the vertebroplasty.  His telephone number is [phone].    I cannot give you tramadol, per my collaborative agreement with Dr. Darling.    You do have a follow-up appoint with Dr. Darling next week.    Continue with physical therapy as best you can.    Topical Voltaren gel and Tylenol are okay to use.    Heat and gentle stretching.      Review of external notes as documented elsewhere in note  24 minutes spent on the date of the encounter doing chart review, history and exam, documentation and further activities per the note       BMI:   Estimated body mass index is 23.14 kg/m  as calculated from the following:    Height as of 5/19/21: 1.508 m (4' 11.37\").    Weight as of 7/5/21: 52.6 kg (116 lb).     See Patient Instructions    Return in about 1 week (around 7/28/2021).    Aakash Flores, [facility]    Erik Brown is a 76 year old who presents for the following health issues   HPI   Patient comes to clinic today for follow-up of her back pain.    She has a history of T12 compression fracture in October 2020 but did not follow through with a vertebroplasty at that time.    History of osteoporosis and started on Prolia about a year ago.    Has used tramadol in the past with some success but was told to stop doing that due to history of long QTc and chronic respiratory illness.    She has seen [facility] for her back pain in the past, patient of Dr. Thompson.  When she was last evaluated, it was noted that her T12 vertebral compression fracture had a 50 to 60% height loss, thought to be significantly worse from previous imaging performed at the [facility] clinic.  Worsening fracture deformity.    She " continues with home PT.  Says that is going relatively well although the discomfort from her back pain limits her ability to perform her activities/exercises to the best of her ability.    Pain makes it difficult to enjoy family related activities.    She feels like her mood has been affected.    Review of Systems   Constitutional, HEENT, cardiovascular, pulmonary, gi and gu systems are negative, except as otherwise noted.      Objective    /84 (BP Location: Right arm, Patient Position: Sitting, Cuff Size: Adult Regular)   Pulse 61   SpO2 94%   Breastfeeding No   There is no height or weight on file to calculate BMI.  Physical Exam   Pt in wheelchair.  Appropriate mood and affect.  Agitated at times regarding her pain         [Follow-Up Visit] : a follow-up visit [FreeTextEntry1] : f/u of anxiety [Home] : at home, [unfilled] , at the time of the visit. [Medical Office: ([facility])___] : at the medical office located in  [Verbal consent obtained from patient] : the patient, [unfilled]

## 2023-01-07 NOTE — TELEPHONE ENCOUNTER
1-5-22  Reason for Call:  Request for results:    Name of test or procedure: COVID     Date of test of procedure: 1-3-21    Location of the test or procedure: [facility] to leave the result message on voice mail or with a family member? YES    Phone number Patient can be reached at:  Cell number on file:    Telephone Information:   Mobile [phone]       Additional comments: anytime    Call taken on 1/5/2022 at 4:11 PM by Zahra Shaikh  
Pt notified, covid test negative.  
Tiffanie Gaming)  Internal Medicine  [address], [address]  [place] [address]  Phone: [phone]  Fax: [phone]  Follow Up Time: 1 week

## 2023-01-12 NOTE — TELEPHONE ENCOUNTER
Spoke with Stephanie, doing well, no questions for me to day. no issues getting and/or taking their medications, reviewed their action plan, in their green zone.  Reminded when we will call again and to call before if there is a question.  Safia Davila, RT, CTTS, Chronic Pulmonary Disease Specialist

## 2023-01-17 NOTE — PROGRESS NOTES
Stephanie is a 77 year old who is being evaluated via a billable video visit.      How would you like to obtain your AVS? MyChart  If the video visit is dropped, the invitation should be resent by: Text to cell phone: [phone]  Will anyone else be joining your video visit? Socorro MAURICIO [facility]    Video-Visit Details    Type of service:  Video Visit   Video Start Time: 11:22 AM  Video End Time:1140    Originating Location (pt. Location): Home    Distant Location (provider location):  On-site  Platform used for Video Visit: [facility]     Palliative Care Outpatient Clinic Progress Note    Patient Name: Irene León  Primary Provider: Pankaj Montanez    Impression & Recommendations & Counseling:  Irene León is a 77 year old female with history of severe COPD; pyriformis injury, and remote history of breast cancer s/p lumpectomy and radiation therapy.    PPS:  (100% normal, 0% death): 50%  Decisional Capacity: very decisional      Impressions, Recommendations & Counseling      SYMPTOM ASSESSMENT:  1.  Shortness of breath secondary to advanced COPD, previous lung abscess with no antibiotics for past couple of months and no further infectious disease follow up needed and pulmonology follow up needs to be set up  Significant activity intolerance as becomes short of breath with minimal exertion in the home; can walk to the bathroom and back but no further;  -Oxygen 2.5 L nasal cannula  -Continue with nebulizers, inhalers, and Mucinex   -Continue following pcp and pulmonology.     2.  Generalized weakness and fatigue secondary to advanced COPD and activity intolerance.  -Completed strengthening at TCU and was discharged 5/10/2022 and PT with [facility].     3.  Chronic pain syndrome with chronic back pain from T12 compression fracture AND AGGRAVATION WITH FALL INTO A RECLINER 10/11/2022 WITH SUBSEQUENT PYRIFORMIS SYNDROME.  3/9/2021 EKG  and QTc 516; back feels better now, but had a fall mid-October 2022  and fell back into the arm of her recliner and landed hard on her pyriformis; she had 5 surgeries on left leg and her orthopedist is coordinating her rehab;      - Gabapentin at 300 mg po BID and not very effective and we will increase to 300 mg po TID and be cautious due to renal function  - Continue Tramadol 50 mg by mouth twice daily as needed for pain.   - Continue Salonpas and Voltaren as needed.  - Slowly improving with PT but not as quick as she'd like. She feels her hip flexors are 'angry all the time'  -Briefly discussed role of medical cannabis as a means to assist pain control and stimulate appetite.  Hold off at this time.     4. Anorexia and weight loss (40+ pound weight loss since 3/2021) -weighed 105# this morning which is 7# up from her all time low; .   - Continue to monitor.   - encouraged Boost twice daily and carnation breakfast;  -Declined nutrition consult.  -Discussed role of medications like Megace or Dronabinol to assist with appetite stimulation. Also discussed medical cannabis.  Patient wishes to discuss this further with Dr. Montanez.     ADVANCED CARE PLANNING/GOALS OF CARE DISCUSSION  -CODE STATUS: DNR/DNI.    - Goals are otherwise life-prolonging.  - POLST document completed 4/13/2022.  -Follow-up with outpatient palliative care in the next 2 months for ongoing goals of care discussion and symptom management and support.          Counseling: All of the above was explained to the patient in lay language. The patient has verbalized a clear understanding of the discussion, asked appropriate questions, which have been answered to patient's apparent satisfaction. The patient is in agreement with the above plan.      Chief Complaint/Patient ID: Irene León 77 year old female with PMHx of severe COPD and pyriformis injury in late October (3 months ago) which she received PT for and she still has some pain though it is somewhat improved.    Last Palliative care appointment: 11/14/22 with  me     Reviewed: yes, no concerns    Interim History:  Irene León is a 77 year old female who is seen today for follow up with Palliative Care via billable video visit.      Pain:  still present in left buttock/hip region and a little better; she is interested in increasing her gabapentin dose and we discussed going to 300 mg po TiD from her current 300 mg po BID.  A new Rx was sent for that to [facility].    Appetite/Nausea: slightly improving; weight up 1# since last visit.     Bowels: no concerns     Sleep: OK     Mood: alright especially when grand kids are around     Coping: OK especially when grand kids are around    Family History- Reviewed in Epic.    Allergies   Allergen Reactions     Sulfa (Sulfonamide Antibiotics) [Sulfa Drugs] Rash     Headaches and dizziness.     Homeopathic Drugs [External Allergen Needs Reconciliation - See Comment] Unknown and Other (See Comments)     Comment: runny nose, Comment: runny nose     Mold Extracts [Molds & Smuts] Unknown     Morphine (Pf) [Morphine] Unknown     hallucinate     Lisinopril Cough, Unknown and Itching     Comment: cough, Comment: cough     Sulfacetamide Sodium [Sulfacetamide] Rash       Social History:  Pertinent changes to social history/social situation since last visit: none  Key support resources:  and grand kids  Advance Directive Status:  Completed; code status is DNR/DNI    Social History     Tobacco Use     Smoking status: Former     Types: Cigarettes     Quit date: 10/28/2007     Years since quitting: 15.2     Smokeless tobacco: Former     Quit date: 9/6/2004   Vaping Use     Vaping Use: Former   Substance Use Topics     Alcohol use: No     Drug use: No         Allergies   Allergen Reactions     Sulfa (Sulfonamide Antibiotics) [Sulfa Drugs] Rash     Headaches and dizziness.     Homeopathic Drugs [External Allergen Needs Reconciliation - See Comment] Unknown and Other (See Comments)     Comment: runny nose, Comment: runny nose      Mold Extracts [Molds & Smuts] Unknown     Morphine (Pf) [Morphine] Unknown     hallucinate     Lisinopril Cough, Unknown and Itching     Comment: cough, Comment: cough     Sulfacetamide Sodium [Sulfacetamide] Rash     Current Outpatient Medications   Medication Sig Dispense Refill     acetaminophen (TYLENOL) 500 MG tablet Take 1,000 mg by mouth every 8 hours as needed for mild pain or fever        albuterol (PROAIR HFA/PROVENTIL HFA/VENTOLIN HFA) 108 (90 Base) MCG/ACT inhaler Inhale 2 puffs into the lungs every 6 hours (Patient taking differently: Inhale 2 puffs into the lungs every 6 hours as needed) 18 g 2     albuterol (PROVENTIL) (2.5 MG/3ML) 0.083% neb solution Take 1 vial (2.5 mg) by nebulization every 2 hours as needed for shortness of breath / dyspnea 90 mL 0     CALCIUM-MAGNESIUM-ZINC PO Take 1 tablet by mouth daily       cetirizine (ZYRTEC) 10 MG tablet Take 10 mg by mouth daily as needed for allergies       chlorhexidine (PERIDEX) 0.12 % solution [CHLORHEXIDINE (PERIDEX) 0.12 % SOLUTION] Apply 15 mL to the mouth or throat at bedtime as needed.        famotidine (PEPCID) 20 MG tablet Take 1 tablet (20 mg) by mouth At Bedtime 90 tablet 3     fluticasone (FLONASE) 50 MCG/ACT nasal spray Spray 1 spray into both nostrils daily 15.8 mL 0     Fluticasone-Umeclidin-Vilanterol (TRELEGY ELLIPTA) 200-62.5-25 MCG/INH oral inhaler Inhale 1 puff into the lungs At Bedtime       furosemide (LASIX) 20 MG tablet Take 1 tablet (20 mg) by mouth daily 90 tablet 1     gabapentin (NEURONTIN) 100 MG capsule Take 1 capsule (100 mg) by mouth every morning AND 2 capsules (200 mg) At Bedtime. 90 capsule 0     ipratropium (ATROVENT HFA) 17 MCG/ACT inhaler [ATROVENT HFA 17 MCG/ACTUATION INHALER] USE 2 INHALATIONS EVERY 6 HOURS (Patient taking differently: Inhale 2 puffs into the lungs every 6 hours as needed [ATROVENT HFA 17 MCG/ACTUATION INHALER] USE 2 INHALATIONS EVERY 6 HOURS) 77.4 g 3     melatonin 5 MG tablet Take 1 tablet (5 mg)  by mouth At Bedtime (Patient taking differently: Take 10 mg by mouth At Bedtime) 30 tablet 0     metoprolol succinate ER (TOPROL XL) 50 MG 24 hr tablet Take 1.5 tablets (75 mg) by mouth At Bedtime       mirabegron (MYRBETRIQ) 50 MG 24 hr tablet Take 1 tablet (50 mg) by mouth daily 90 tablet 1     multivitamin w/minerals (THERA-VIT-M) tablet Take 1 tablet by mouth daily       potassium chloride ER (K-TAB/KLOR-CON) 10 MEQ CR tablet Take 1 tablet by mouth daily       rivaroxaban ANTICOAGULANT (XARELTO) 15 MG TABS tablet Take 1 tablet (15 mg) by mouth At Bedtime       simvastatin (ZOCOR) 40 MG tablet Take 1 tablet (40 mg) by mouth At Bedtime 90 tablet 3     solifenacin (VESICARE) 5 MG tablet Take 1 tablet (5 mg) by mouth daily 90 tablet 1     tamoxifen (NOLVADEX) 20 MG tablet TAKE 1 TABLET DAILY 90 tablet 3     traMADol (ULTRAM) 50 MG tablet Take 1 tablet (50 mg) by mouth 2 times daily 60 tablet 0     vitamin D3 (CHOLECALCIFEROL) 50 mcg (2000 units) tablet Take 1 tablet by mouth daily       ipratropium - albuterol 0.5 mg/2.5 mg/3 mL (DUONEB) 0.5-2.5 (3) MG/3ML neb solution Take 1 vial (3 mLs) by nebulization 4 times daily (Patient not taking: Reported on 1/17/2023) 90 mL 4     Past Medical History:   Diagnosis Date     ANATOLIY (acute kidney injury) (H)      Allergic rhinitis      Arrhythmia      Atrial fibrillation with RVR (H)      Bacteremia      Breast cancer (H) 2017     Cardiomyopathy (H)      Centrilobular emphysema (H)      CHF (congestive heart failure) (H)      CKD (chronic kidney disease)      COPD (chronic obstructive pulmonary disease) (H)      Coronary artery disease      Depression      Dysphagia      E. coli sepsis (H)      Factor 5 Leiden mutation, heterozygous (H)      GERD (gastroesophageal reflux disease)      Hx of radiation therapy 2017     Hyperlipidemia      Hypertension      Hypokalemia      Hypomagnesemia      Idiopathic cardiomyopathy (H)      Left hip pain 4/30/2014     OAB (overactive bladder)       Osteoporosis      Sacral insufficiency fracture      Sinusitis      Syncope      Urge incontinence      Vocal cord dysfunction      Past Surgical History:   Procedure Laterality Date     ARTHROPLASTY REVISION HIP Left      BIOPSY BREAST Right 2017     BLADDER SURGERY      bladder interstim with removal     CARDIAC CATHETERIZATION       CATARACT EXTRACTION Left 07/18/2017     IR ABDOMINAL AORTOGRAM  4/16/2003     IR AORTIC ARCH 4 VESSEL ANGIOGRAM  4/16/2003     IR MISCELLANEOUS PROCEDURE  4/16/2003     LUMPECTOMY BREAST Left 06/2017    x2     PICC DOUBLE LUMEN PLACEMENT  4/11/2022          PICC TRIPLE LUMEN PLACEMENT  4/7/2022          ZZC OPEN TX FEMORAL FRACTURE DISTAL MED/LAT CONDYLE Left 10/28/2015    Procedure: OPEN REDUCTION INTERNAL FIXATION LEFT  PROXIMAL FEMUR PERIPROSTHETIC FRACTURE;  Surgeon: Yovanny Albarran MD;  Location: [facility];  Service: Orthopedics       Physical Exam:   GENERAL APPEARANCE:    healthy, alert and no distress; neatly groomed  EYES: Eyes grossly normal to inspection, PERRLA, conjunctivae and sclerae without injection or discharge, EOM intact   RESP:  no increased work of breathing; speaks in complete sentences;   MS: No musculoskeletal defects are noted  SKIN: No suspicious lesions or rashes, hydration status appears adequate with normal skin turgor   PSYCH: Alert and oriented x3; speech- coherent , normal rate and volume; able to articulate logical thoughts, able to abstract reason, no tangential thoughts, no hallucinations or delusions, mentation appears normal, Mood is euthymic. Affect is appropriate for this mood state and bright. Thought content is free of suicidal ideation, hallucinations, and delusions.  Eye contact is good during conversation.       Key Data Reviewed:  LABS: 10/31/22- Cr 1.28;    IMAGING: 10/11/22 XR FEMUR L 2 V  IMPRESSION: Left RAY with longstem femoral component. Plate and cerclage wire fixation. Suture anchors in the greater tuberosity.  Fracture of the cerclage wires with sparing of the second and third cerclage wire from the top. Osteopenia. No acute   fractures are evident. Stable osseous volume loss in the subtrochanteric region.    34 minutes were spent on the day of the encounter in review of medical record, performing video visit, documentation and care coordination as described above    Tobi Miguel MD MS FAAFP  [facility] Pall[facility]  Office [phone]  Fax [phone]

## 2023-01-18 NOTE — TELEPHONE ENCOUNTER
----- Message from Tobi Miguel MD sent at 1/17/2023 11:57 AM CST -----  Please arrange for video follow up in 8 weeks.  Thanks  Tobi

## 2023-02-13 NOTE — TELEPHONE ENCOUNTER
I spoke with Stephanie. She is feeling well today  as far as her breathing is concerned. She is able to get and take her medications and is compliant in taking them. Reviewed COPD Action Plan. Stephanie had no problems or questions for me today.    Sharon Ryan, RT, TTS, Chronic Pulmonary Disease Specialist

## 2023-03-06 NOTE — PROGRESS NOTES
[facility]: Palliative Care                                                                                        Received message from patient requesting refill of tramadol.     Last refill: 12/15/2022  Last office visit: 1/17/2023  Message to schedulers for follow up in March as MD requested.    Will route request to MD for review.     Reviewed [place]  Report.       Signature:  JARRED EstradaN, RN, OCN

## 2023-03-06 NOTE — TELEPHONE ENCOUNTER
"Received message from [facility]; patient asking for expedited mammogram due to a lump.  Called patient to get additional information.  She said for about a week, she has noted what could be a lump \"or a rib\" running from Lt axillary to under Lt breast, somewhat in a line.Tender if she pushes on it. No redness on skin, no nipple discharge, no dimpling.   She continues on Tamoxifen.  Suggested she may need office visit for clinical exam and she got very upset, saying \"it's such a hassle, I'm on oxygen.\"  Please advise. Rose Marie Davies RN      "

## 2023-03-07 NOTE — TELEPHONE ENCOUNTER
Per Dr. Suarez, will place diagnostic mammogram order.   The patient was contacted and is aware of plan and verbalizes agreement.  Rose Marie Davies RN

## 2023-03-16 NOTE — PATIENT INSTRUCTIONS
It was good to see you today, Stephanie.    Here are the things we talked about:  Stop the tramadol today  Start butrans patch 5 mcg/hour  tomorrow and replace it every week.  Please do an EKG after you've been on the patch for a week.  I sent an order to the clinic for this.    Someone from the team will reach out to schedule a follow up appointment in 2 months. The nurse will call Monday to see how the switch to the patch is going.    Tips for Conserving Your Energy   Cancer and cancer therapy can beaccompanied by feelings of extreme fatigue. To help you during the times you feel tired, these easy tips help conserve the energy you do have.   Activities of Daily Living   Plan ahead to avoid rushing.  Sit down to bathe and dry off. Wear a vivek robe instead of drying off.   Use a shower/bath organizer to decrease leaning and reaching.   Use extension handles on sponges and brushes.   Install grab rails inthe bathroom or use an elevated toilet seat.   Lay out clothes and toiletries before dressing.   Minimize leaning over to put on clothes and shoes. Bring your foot to your knee to apply socks and shoes. Fasten bra infront then turn to back.   Modify your home to maximize efficient energy use. For example, place chairs strategically to allow for rest stops -- for instance, along a long hallway.   Wear comfortable shoes andlow-heeled, slip on shoes. Wear button front shirts rather than pullovers.   Housekeeping   Schedule household tasks throughout the week.   Do housework sitting down when possible. Use long-handleddusters, dust mops, etc. Use a wheeled cart or valencia's apron to carry supplies.   Delegate heavy housework, shopping, laundry and childcare when possible.   Drag or slide objects rather than lifting. If you do needto lift an object, use your leg muscles rather than your back muscles.   Sit when ironing and take rest periods.   Stop working before becoming overly tired.   Shopping   Organize list by edward.    Use a grocery cart for support.   Shop at less busy times.   Ask for help in getting to the car.   Buy clothes that don't require ironing.   Meal Preparation   Use convenience and easy-to-preparefoods.   Use small appliances that take less effort to use.   Arrange the preparation environment for easy access to frequently used items.   Prepare meals sitting down.   Soak dishes instead of scrubbing and letdishes air dry.   Prepare double portions and freeze half.   Plan activities that can be done sitting down, such as drawing pictures, playing games, reading, and computer games.   Encourage children to climb up ontoyour lap or into the highchair instead of being lifted.   Make a game of the household chores so that children will want to help.   Delegate childcare when possible.   Workplace   Plan workload totake advantage of peak energy times. Alternate physically demanding tasks with less demanding tasks.   Arrange work environment for easy access to commonly used equipment and supplies.   Leisure Do activities with acompanion.   Select activities that match your energy level. Balance activity and rest. Don't get over-tired.     How to get a hold of us:  For non-urgent matters, MyChart works best.    For more urgent matters, or if you prefer not to use MyChart, call our clinic nurse coordinator Anabela Hillman RN at [phone]    We have an on-call number for evenings and weekends. Please call this only if you are having uncontrolled symptoms or serious side effects from your medicines: [phone].     For refills, please give us a week (5 working days) notice. We don't always have providers available everyday to do refills. If you call the day you run out of your medicine, we may not be able to refill it in time, so call 5 days in advance!    Tobi Miguel MD MS [facility] Palliative Care Service  Office [phone]  Fax [phone]

## 2023-03-16 NOTE — PROGRESS NOTES
Video-Visit Details    Type of service:  Video Visit    Video Start Time (time video started): 1152    Video End Time (time video stopped): 1230    Originating Location (pt. Location): Home        Distant Location (provider location):  On-site    Mode of Communication:  Video Conference via [facility]    Palliative Care Outpatient Clinic Progress Note    Patient Name: Irene León  Primary Provider: Pankaj Montanez    Impression & Recommendations & Counseling:  Irene León is a 77 year old female with history of severe COPD; pyriformis injury, and remote history of breast cancer s/p lumpectomy and radiation therapy.     PPS:  (100% normal, 0% death): 50%  Decisional Capacity: very decisional        Impressions, Recommendations & Counseling      SYMPTOM ASSESSMENT:  1.  Shortness of breath secondary to advanced COPD, previous lung abscess with no antibiotics for past couple of months and no further infectious disease follow up needed and pulmonology follow up is scheduled for May  Significant activity intolerance as becomes short of breath with minimal exertion in the home; can still walk to the bathroom and back but no further;  -Oxygen 3.5-4 L nasal cannula  -Continue with nebulizers, inhalers, and Mucinex   -Continue following pcp and pulmonology.  -Continue to exercise by walking around the house and walking the steps up and down and using therabands and light weights;      2.  Generalized weakness and fatigue secondary to advanced COPD and activity intolerance.  -Completed strengthening at TCU and was discharged 5/10/2022 and PT with [facility].  -Continue home exercise program with therabands and light weights     3.  Chronic pain syndrome with chronic back pain from T12 compression fracture AND AGGRAVATION WITH FALL INTO A RECLINER 10/11/2022 WITH SUBSEQUENT PYRIFORMIS SYNDROME.  4/2022 EKG    back had felt better but had a fall mid-October 2022 and fell back into the arm of her recliner  and landed hard on her pyriformis; she had 5 surgeries on left leg and her orthopedist is coordinating her rehab; she's wondering about a spinal cord stimulator and I encouraged her to discuss this with her PCP; her sister is being evaluated for a SCS soon.     -Start Butrans 5 mcg/hour and check ECG in 2 weeks.  - Gabapentin at 600/300/600 and she is cautioned due to renal function  - Stephanie feels Tramadol 50 mg by mouth twice daily is not helpful (she actually had increased this to TID) and would like to try something else.  Stop the middle of the day dose and next Thursday start using 25 mg by mouth twice a day and then a week later drop one of the daily doses and then a week later stop it all together.   - Continue Salonpas and Voltaren as needed.  - Slowly improving with PT but not as quick as she'd like. She feels her hip flexors are 'angry all the time'  -Briefly discussed role of medical cannabis as a means to assist pain control and stimulate appetite.  Hold off at this time.     4. Anorexia and weight loss (40+ pound weight loss since 3/2021) - now at 110# weighed 105# in January and 98# was her all time low   - Continue to monitor.   - encouraged Boost twice daily and carnation breakfast;  -Declined nutrition consult.  -Discussed role of medications like Megace or Dronabinol to assist with appetite stimulation. Also discussed medical cannabis.  Patient wishes to discuss this further with Dr. Montanez.     ADVANCED CARE PLANNING/GOALS OF CARE DISCUSSION  -CODE STATUS: DNR/DNI.    - Goals are otherwise life-prolonging.  - POLST document completed 4/13/2022.  -Follow-up with outpatient palliative care in the next 2 months for ongoing goals of care discussion and symptom management and support.         Counseling: All of the above was explained to the patient in lay language. The patient has verbalized a clear understanding of the discussion, asked appropriate questions, which have been answered to patient's  apparent satisfaction. The patient is in agreement with the above plan.      Chief Complaint/Patient ID: Irene León 78 year old female with PMHx of severe COPD; pyriformis injury, and remote history of breast cancer s/p lumpectomy and radiation therapy.      Last Palliative care appointment: 1/17/2023 with me     Reviewed: yes, no concerns    Interim History:  Irene León is a 78 year old female who is seen today for follow up with Palliative Care via billable video visit.      Pain:  Stephanie increased Tramadol to TID and it isn't helping her back and it continues to radiate down her arms and up her back into her neck    Appetite/Nausea: pretty good; she has gained weight     Bowels: working fine;      Sleep: OK     Mood: 'pretty good' has days when she feels down and can't jump up to do something she wants to; she is 'not ready to throw in the towel.'    Other: no other complaints     Coping: overall OK a bit of a blow from her 's Alzheimer diagnosis    Family History- Reviewed in Epic.    Allergies   Allergen Reactions     Sulfa (Sulfonamide Antibiotics) [Sulfa Drugs] Rash     Headaches and dizziness.     Homeopathic Drugs [External Allergen Needs Reconciliation - See Comment] Unknown and Other (See Comments)     Comment: runny nose, Comment: runny nose     Mold Extracts [Molds & Smuts] Unknown     Morphine (Pf) [Morphine] Unknown     hallucinate     Lisinopril Cough, Unknown and Itching     Comment: cough, Comment: cough     Sulfacetamide Sodium [Sulfacetamide] Rash       Social History:  Pertinent changes to social history/social situation since last visit: none;  has been diagnosed with Alzheimer's and Stephanie notices a decline; her daughter is doing some [facility] about options.  Key support resources: family especially daughter Leelee  Advance Directive Status:  Present in record    Social History     Tobacco Use     Smoking status: Former     Types: Cigarettes     Quit  date: 10/28/2007     Years since quitting: 15.3     Smokeless tobacco: Former     Quit date: 9/6/2004   Vaping Use     Vaping Use: Former   Substance Use Topics     Alcohol use: No     Drug use: No         Allergies   Allergen Reactions     Sulfa (Sulfonamide Antibiotics) [Sulfa Drugs] Rash     Headaches and dizziness.     Homeopathic Drugs [External Allergen Needs Reconciliation - See Comment] Unknown and Other (See Comments)     Comment: runny nose, Comment: runny nose     Mold Extracts [Molds & Smuts] Unknown     Morphine (Pf) [Morphine] Unknown     hallucinate     Lisinopril Cough, Unknown and Itching     Comment: cough, Comment: cough     Sulfacetamide Sodium [Sulfacetamide] Rash     Current Outpatient Medications   Medication Sig Dispense Refill     acetaminophen (TYLENOL) 500 MG tablet Take 1,000 mg by mouth every 8 hours as needed for mild pain or fever        albuterol (PROAIR HFA/PROVENTIL HFA/VENTOLIN HFA) 108 (90 Base) MCG/ACT inhaler Inhale 2 puffs into the lungs every 6 hours (Patient taking differently: Inhale 2 puffs into the lungs every 6 hours as needed) 18 g 2     CALCIUM-MAGNESIUM-ZINC PO Take 1 tablet by mouth daily       cetirizine (ZYRTEC) 10 MG tablet Take 10 mg by mouth daily as needed for allergies       chlorhexidine (PERIDEX) 0.12 % solution [CHLORHEXIDINE (PERIDEX) 0.12 % SOLUTION] Apply 15 mL to the mouth or throat at bedtime as needed.        famotidine (PEPCID) 20 MG tablet Take 1 tablet (20 mg) by mouth At Bedtime 90 tablet 3     fluticasone (FLONASE) 50 MCG/ACT nasal spray Spray 1 spray into both nostrils daily 15.8 mL 0     Fluticasone-Umeclidin-Vilanterol (TRELEGY ELLIPTA) 200-62.5-25 MCG/INH oral inhaler Inhale 1 puff into the lungs At Bedtime       furosemide (LASIX) 20 MG tablet Take 1 tablet (20 mg) by mouth daily 90 tablet 1     gabapentin (NEURONTIN) 300 MG capsule Take 1 capsule (300 mg) by mouth 3 times daily 90 capsule 4     ipratropium (ATROVENT HFA) 17 MCG/ACT inhaler  [ATROVENT HFA 17 MCG/ACTUATION INHALER] USE 2 INHALATIONS EVERY 6 HOURS (Patient taking differently: Inhale 2 puffs into the lungs every 6 hours as needed [ATROVENT HFA 17 MCG/ACTUATION INHALER] USE 2 INHALATIONS EVERY 6 HOURS) 77.4 g 3     melatonin 5 MG tablet Take 1 tablet (5 mg) by mouth At Bedtime (Patient taking differently: Take 10 mg by mouth At Bedtime) 30 tablet 0     metoprolol succinate ER (TOPROL XL) 50 MG 24 hr tablet Take 1.5 tablets (75 mg) by mouth At Bedtime       mirabegron (MYRBETRIQ) 50 MG 24 hr tablet Take 1 tablet (50 mg) by mouth daily 90 tablet 1     multivitamin w/minerals (THERA-VIT-M) tablet Take 1 tablet by mouth daily       potassium chloride ER (K-TAB/KLOR-CON) 10 MEQ CR tablet Take 1 tablet by mouth daily       rivaroxaban ANTICOAGULANT (XARELTO) 15 MG TABS tablet Take 1 tablet (15 mg) by mouth At Bedtime       simvastatin (ZOCOR) 40 MG tablet Take 1 tablet (40 mg) by mouth At Bedtime 90 tablet 3     solifenacin (VESICARE) 5 MG tablet Take 1 tablet (5 mg) by mouth daily 90 tablet 1     tamoxifen (NOLVADEX) 20 MG tablet TAKE 1 TABLET DAILY 90 tablet 3     traMADol (ULTRAM) 50 MG tablet Take 1 tablet (50 mg) by mouth 2 times daily 60 tablet 0     vitamin D3 (CHOLECALCIFEROL) 50 mcg (2000 units) tablet Take 1 tablet by mouth daily       albuterol (PROVENTIL) (2.5 MG/3ML) 0.083% neb solution Take 1 vial (2.5 mg) by nebulization every 2 hours as needed for shortness of breath / dyspnea (Patient not taking: Reported on 3/16/2023) 90 mL 0     ipratropium - albuterol 0.5 mg/2.5 mg/3 mL (DUONEB) 0.5-2.5 (3) MG/3ML neb solution Take 1 vial (3 mLs) by nebulization 4 times daily (Patient not taking: Reported on 1/17/2023) 90 mL 4     Past Medical History:   Diagnosis Date     ANATOLIY (acute kidney injury) (H)      Allergic rhinitis      Arrhythmia      Atrial fibrillation with RVR (H)      Bacteremia      Breast cancer (H) 2017     Cardiomyopathy (H)      Centrilobular emphysema (H)      CHF  (congestive heart failure) (H)      CKD (chronic kidney disease)      COPD (chronic obstructive pulmonary disease) (H)      Coronary artery disease      Depression      Dysphagia      E. coli sepsis (H)      Factor 5 Leiden mutation, heterozygous (H)      GERD (gastroesophageal reflux disease)      Hx of radiation therapy 2017     Hyperlipidemia      Hypertension      Hypokalemia      Hypomagnesemia      Idiopathic cardiomyopathy (H)      Left hip pain 4/30/2014     OAB (overactive bladder)      Osteoporosis      Sacral insufficiency fracture      Sinusitis      Syncope      Urge incontinence      Vocal cord dysfunction      Past Surgical History:   Procedure Laterality Date     ARTHROPLASTY REVISION HIP Left      BIOPSY BREAST Right 2017     BLADDER SURGERY      bladder interstim with removal     CARDIAC CATHETERIZATION       CATARACT EXTRACTION Left 07/18/2017     IR ABDOMINAL AORTOGRAM  4/16/2003     IR AORTIC ARCH 4 VESSEL ANGIOGRAM  4/16/2003     IR MISCELLANEOUS PROCEDURE  4/16/2003     LUMPECTOMY BREAST Left 06/2017    x2     PICC DOUBLE LUMEN PLACEMENT  4/11/2022          PICC TRIPLE LUMEN PLACEMENT  4/7/2022          ZZC OPEN TX FEMORAL FRACTURE DISTAL MED/LAT CONDYLE Left 10/28/2015    Procedure: OPEN REDUCTION INTERNAL FIXATION LEFT  PROXIMAL FEMUR PERIPROSTHETIC FRACTURE;  Surgeon: Yovanny Albarran MD;  Location: [facility];  Service: Orthopedics       Physical Exam:   GENERAL APPEARANCE: chronically ill appearing with NC in place, alert and no distress; neatly groomed  EYES: Eyes grossly normal to inspection, PERRLA, conjunctivae and sclerae without injection or discharge, EOM intact   RESP:  no increased work of breathing; speaks in half sentences;   MS: No musculoskeletal defects are noted  SKIN: No suspicious lesions or rashes, hydration status appears adequate with normal skin turgor   PSYCH: Alert and oriented x3; speech- coherent , normal rate and volume; able to articulate logical  thoughts, able to abstract reason, no tangential thoughts, no hallucinations or delusions, mentation appears normal, Mood is euthymic. Affect is appropriate for this mood state and bright. Thought content is free of suicidal ideation, hallucinations, and delusions.  Eye contact is good during conversation.       Key Data Reviewed:  LABS: 10/31/2023- Cr 1.28,  2022 Albumin 3.6,  06/15/2022 Hgb 11.2,    EK2022 QTc 488 msec  IMAGING: no recent imaging for review    46 minutes were spent on the date of the encounter doing chart review, history and exam, documentation and further activities as noted above.    Tobi Miguel MD MS FAAFP  [facility]  Office [phone]  Fax [phone]

## 2023-03-16 NOTE — NURSING NOTE
Is the patient currently in the state of [place]? YES    Visit mode:VIDEO    If the visit is dropped, the patient can be reconnected by: VIDEO VISIT: Text to cell phone: [phone]    Will anyone else be joining the visit? NO      How would you like to obtain your AVS? MyChart    Are changes needed to the allergy or medication list? NO    Reason for visit: return video visit    Yisel Rocha, DIVYA

## 2023-03-17 NOTE — TELEPHONE ENCOUNTER
Patient called asking about coverage of the patch. She said that her sister told her most insurance will not cover for the patch, so she wants to make sure it's covered by her insurance. She also wants to check on if she will need to hand carry a copy of the Rx to the pharmacy.     Please call her to further discuss.

## 2023-03-20 NOTE — TELEPHONE ENCOUNTER
Central Prior Authorization Team   Phone: [phone]      PA Initiation    Medication: BUPRENORPHINE 5MCG/HR TD PATCH-PA initiated  Insurance Company: [employer] - Phone [phone] Fax [phone]  Pharmacy Filling the Rx: [id number] [place] [id number] [facility]  [place] [facility]  Filling Pharmacy Phone: [phone]  Filling Pharmacy Fax:    Start Date: 3/20/2023

## 2023-03-20 NOTE — TELEPHONE ENCOUNTER
Prior Authorization Retail Medication Request    Medication/Dose: BUPRENORPHINE 5MCG/HR TD PATCH  ICD code (if different than what is on RX):    Previously Tried and Failed:    Rationale:      Insurance Name:  Medicare  Insurance ID:  [id number]      Pharmacy Information (if different than what is on RX)  Name:  [facility]  Phone:  [phone]

## 2023-03-21 NOTE — TELEPHONE ENCOUNTER
Prior Authorization Approval    Authorization Effective Date: 2/18/2023  Authorization Expiration Date: 12/31/2099  Medication: BUPRENORPHINE 5MCG/HR TD PATCH-PA approved  Approved Dose/Quantity:   Reference #: [id number]   Insurance Company: [employer] - Phone [phone] Fax [phone]  Expected CoPay:       CoPay Card Available:      Foundation Assistance Needed:    Which Pharmacy is filling the prescription (Not needed for infusion/clinic administered): [id number] [place], [place] - [facility] [address] AT [facility]  Pharmacy Notified: Yes  Patient Notified: No

## 2023-03-27 NOTE — TELEPHONE ENCOUNTER
Call placed to patient to check in after starting the butrans patch. Pt states she picked it up yesterday, however hasn't started it yet. She is nervous to try it because her sister tried it in the past and it wasn't helpful. Reassured her that we have many patients who use this medication and have their pain well controlled. Will plan to call her again next week to check in. She knows to call before then if needed.    JARRED YoderN, RN  Palliative Care Nurse Clinician    [phone] (Direct)  [phone] (Main)  [phone] (Appointment Scheduling)

## 2023-04-04 NOTE — TELEPHONE ENCOUNTER
Please tell I'm sorry she hasn't noticed a change.  The butrans is stronger than the tramadol and we could increase it to 7.5 mcg/hour if she wants to give me more time to find the right dose for her.

## 2023-04-04 NOTE — PROGRESS NOTES
[facility]: Palliative Care                                                                                        Called to check in on new butrans script. She placed patch last Tuesday, the 28th. She says it has done nothing. Pain is the same. No benefit. She also denies side effects. Says she was worried about the patch doing nothing and that is exactly what has happened.     She has continued tramadol BID.     Advised I would update MD and follow up with any recommendations.     Signature:  Lo Gregory, JARREDN, RN, OCN

## 2023-04-12 NOTE — TELEPHONE ENCOUNTER
Spoke with Stephanie, doing alright, no questions for me to day. no issues getting and/or taking their medications, reviewed their action plan.  Reminded when we will call again and to call before if there is a question.  Safia Davila, RT, CTTS, Chronic Pulmonary Disease Specialist

## 2023-04-14 NOTE — TELEPHONE ENCOUNTER
Received telephone call from patient requesting refill of butrans patch. Pt reports no improvement in pain since dose was increased from 5 mcg/hr to 7.5 mcg/hr on 4/5. Dr. Miguel would like pt to increase to 10 mcg/hr.     Last refill: 4/5/23  Last office visit: 3/16/23  Scheduled for follow up 5/18/23     Will route request to MD for review.     Reviewed [place]  Report.

## 2023-04-17 PROBLEM — I50.20 HEART FAILURE WITH REDUCED EJECTION FRACTION (H): Status: ACTIVE | Noted: 2023-01-01

## 2023-04-17 NOTE — PATIENT INSTRUCTIONS
Irene León,    It was a pleasure to see you today at [facility].     My recommendations after this visit include:  - Please follow up with Dr Chilel in 3 months   - Please follow up with Samantha Herrera in 1 year  - Continue current medications    Samantha Herrera, CNP

## 2023-04-17 NOTE — Clinical Note
Dr Miguel, EKG completed in clinic today per patient since starting buprenorphine. QTc at 505 ms which is slightly higher than her EKG from last April. She tends to have a prolonged QTc. Just wanted to inform you. Thanks

## 2023-04-17 NOTE — LETTER
4/17/2023    Pankaj Montanez MD  [address] [place] [address]    RE: Irene León       Dear Colleague,     I had the pleasure of seeing Irene León in the [facility].        Assessment/Recommendations   Assessment:    1. Nonischemic cardiomyopathy, heart failure with reduced ejection fraction, ejection fraction 35-40%, NYHA class III: Compensated.  She has chronic dyspnea on exertion.  She has trace edema which is chronic.  Her weight has increased slightly but states she is eating more.  2.  Severe COPD: Continues chronic oxygen.  She has been working with palliative care.  3.  Hypertension: Slightly elevated at 144/80    Plan:  1.  EKG needed due to new medication started by palliative   2.  Continue current medications  3.  Continue monitoring weights and following a low-salt diet    Irene León will follow up with Dr Chilel in 3 months and in the heart failure clinic in 1 year.     History of Present Illness/Subjective    Ms. Irene León is a 78 year old female seen at [facility] heart failure clinic today for continued follow-up.  Her daughter and  accompany her today.  She follows up for nonischemic cardiomyopathy, heart failure with reduced ejection fraction.  Her last echocardiogram was done April 2022 which showed ejection fraction of 35 to 40%.  She has a past medical history significant for hypertension, chronic kidney disease stage III, severe COPD on chronic oxygen, and history of breast cancer status post lumpectomy and radiation therapy.    Today, she states she is feeling well.  She has chronic dyspnea on exertion and lower extremity edema.  She denies lightheadedness, orthopnea, PND, palpitations, chest pain and abdominal fullness/bloating.      She is monitoring home weights which are stable.  She is following a low sodium diet.      ECHOCARDIOGRAM: 4/8/2022  Interpretation Summary     1. The left ventricle is normal in size. Left ventricular  "systolic performance  is moderately reduced. The ejection fraction is estimated to be 35-40%.  2. There is moderate global reduction in left ventricular systolic  performance.  3. There is abnormal septal motion possibly related to altered electrical  activation due to bundle branch block.  4. There is mild-moderate, to perhaps moderate, tricuspid insufficiency.  5. Normal right ventricular size with mildly reduced right ventricular  systolic performance.  6. There is mild right atrial enlargement.  7. Right ventricular systolic pressure relative to right atrial pressure is  mildly increased. The pulmonary artery pressure is estimated to be 45-50 mmHg  plus right atrial pressure (the IVC is mildly dilated).     Physical Examination Review of Systems   BP (!) 144/80 (BP Location: Left arm, Patient Position: Sitting, Cuff Size: Adult Regular)   Pulse 59   Resp 16   Ht 1.499 m (4' 11\")   Wt 49.2 kg (108 lb 8 oz)   SpO2 92%   BMI 21.91 kg/m    Body mass index is 21.91 kg/m .  Wt Readings from Last 3 Encounters:   04/17/23 49.2 kg (108 lb 8 oz)   10/11/22 47.2 kg (104 lb)   08/11/22 47.6 kg (105 lb)       General Appearance:   no acute distress   ENT/Mouth: Wearing mask   EYES:  no scleral icterus, normal conjunctivae   Neck: no thyromegaly   Chest/Lungs:   lungs are clear to auscultation, no rales or wheezing, equal chest wall expansion    Cardiovascular:   Regular. Normal first and second heart sounds with no murmurs, rubs, or gallops, trace edema bilaterally    Abdomen:  bowel sounds are present   Extremities: no cyanosis or clubbing   Skin: warm   Neurologic: no tremors     Psychiatric: alert and oriented x3                                              Medical History  Surgical History Family History Social History   Past Medical History:   Diagnosis Date    ANATOLIY (acute kidney injury) (H)     Allergic rhinitis     Arrhythmia     Atrial fibrillation with RVR (H)     Bacteremia     Breast cancer (H) 2017    " Cardiomyopathy (H)     Centrilobular emphysema (H)     CHF (congestive heart failure) (H)     CKD (chronic kidney disease)     COPD (chronic obstructive pulmonary disease) (H)     Coronary artery disease     Depression     Dysphagia     E. coli sepsis (H)     Factor 5 Leiden mutation, heterozygous (H)     GERD (gastroesophageal reflux disease)     Heart failure with reduced ejection fraction (H) 4/17/2023    Hx of radiation therapy 2017    Hyperlipidemia     Hypertension     Hypokalemia     Hypomagnesemia     Idiopathic cardiomyopathy (H)     Left hip pain 4/30/2014    OAB (overactive bladder)     Osteoporosis     Sacral insufficiency fracture     Sinusitis     Syncope     Urge incontinence     Vocal cord dysfunction     Past Surgical History:   Procedure Laterality Date    ARTHROPLASTY REVISION HIP Left     BIOPSY BREAST Right 2017    BLADDER SURGERY      bladder interstim with removal    CARDIAC CATHETERIZATION      CATARACT EXTRACTION Left 07/18/2017    IR ABDOMINAL AORTOGRAM  4/16/2003    IR AORTIC ARCH 4 VESSEL ANGIOGRAM  4/16/2003    IR MISCELLANEOUS PROCEDURE  4/16/2003    LUMPECTOMY BREAST Left 06/2017    x2    PICC DOUBLE LUMEN PLACEMENT  4/11/2022         PICC TRIPLE LUMEN PLACEMENT  4/7/2022         ZZC OPEN TX FEMORAL FRACTURE DISTAL MED/LAT CONDYLE Left 10/28/2015    Procedure: OPEN REDUCTION INTERNAL FIXATION LEFT  PROXIMAL FEMUR PERIPROSTHETIC FRACTURE;  Surgeon: Yovanny Albarran MD;  Location: [facility] OR;  Service: Orthopedics    Family History   Problem Relation Age of Onset    Osteoporosis Other     Prostate Cancer Brother     Breast Cancer Maternal Aunt         age thought to be in her 70's-80's    Prostate Cancer Maternal Uncle     Social History     Socioeconomic History    Marital status:      Spouse name: Not on file    Number of children: Not on file    Years of education: Not on file    Highest education level: Not on file   Occupational History    Not on file   Tobacco Use     Smoking status: Former     Types: Cigarettes     Quit date: 10/28/2007     Years since quitting: 15.4    Smokeless tobacco: Former     Quit date: 9/6/2004   Vaping Use    Vaping status: Former   Substance and Sexual Activity    Alcohol use: No    Drug use: No    Sexual activity: Not on file   Other Topics Concern    Not on file   Social History Narrative     and lives in home with 3 flight of steps     Social Determinants of Health     Financial Resource Strain: Not on file   Food Insecurity: Not on file   Transportation Needs: Not on file   Physical Activity: Not on file   Stress: Not on file   Social Connections: Not on file   Intimate Partner Violence: Not on file   Housing Stability: Not on file          Medications  Allergies   Current Outpatient Medications   Medication Sig Dispense Refill    acetaminophen (TYLENOL) 500 MG tablet Take 1,000 mg by mouth every 8 hours as needed for mild pain or fever       acetylcysteine (MUCOMYST) 10 % nebulizer solution Inhale 4 mLs into the lungs every 4 hours      albuterol (PROAIR HFA/PROVENTIL HFA/VENTOLIN HFA) 108 (90 Base) MCG/ACT inhaler Inhale 2 puffs into the lungs      albuterol (PROAIR HFA/PROVENTIL HFA/VENTOLIN HFA) 108 (90 Base) MCG/ACT inhaler Inhale 2 puffs into the lungs every 6 hours (Patient taking differently: Inhale 2 puffs into the lungs every 6 hours as needed) 18 g 2    albuterol (PROVENTIL) (2.5 MG/3ML) 0.083% neb solution Take 1 vial (2.5 mg) by nebulization every 2 hours as needed for shortness of breath / dyspnea 90 mL 0    azelastine (ASTELIN) 0.1 % nasal spray Spray 1 spray in nostril      azithromycin (ZITHROMAX) 250 MG tablet       [facility]-9-4.8 MCG/ACT AERO inhaler Inhale 2 puffs into the lungs 2 times daily      buprenorphine (BUTRANS) 10 MCG/HR WK patch Place 1 patch onto the skin every 7 days 4 patch 0    CALCIUM-MAGNESIUM-ZINC PO Take 1 tablet by mouth daily      cetirizine (ZYRTEC) 10 MG tablet Take 10 mg by mouth  daily as needed for allergies      chlorhexidine (PERIDEX) 0.12 % solution [CHLORHEXIDINE (PERIDEX) 0.12 % SOLUTION] Apply 15 mL to the mouth or throat at bedtime as needed.       famotidine (PEPCID) 20 MG tablet Take 1 tablet (20 mg) by mouth At Bedtime 90 tablet 3    fluticasone (FLONASE) 50 MCG/ACT nasal spray Spray 1 spray into both nostrils daily 15.8 mL 0    Fluticasone-Umeclidin-Vilanterol (TRELEGY ELLIPTA) 200-62.5-25 MCG/INH oral inhaler Inhale 1 puff into the lungs At Bedtime      furosemide (LASIX) 20 MG tablet Take 1 tablet (20 mg) by mouth daily 90 tablet 1    gabapentin (NEURONTIN) 300 MG capsule Take 1 capsule (300 mg) by mouth 3 times daily 90 capsule 4    ipratropium (ATROVENT HFA) 17 MCG/ACT inhaler [ATROVENT HFA 17 MCG/ACTUATION INHALER] USE 2 INHALATIONS EVERY 6 HOURS (Patient taking differently: Inhale 2 puffs into the lungs every 6 hours as needed [ATROVENT HFA 17 MCG/ACTUATION INHALER] USE 2 INHALATIONS EVERY 6 HOURS) 77.4 g 3    ipratropium - albuterol 0.5 mg/2.5 mg/3 mL (DUONEB) 0.5-2.5 (3) MG/3ML neb solution Inhale 3 mLs into the lungs      ipratropium - albuterol 0.5 mg/2.5 mg/3 mL (DUONEB) 0.5-2.5 (3) MG/3ML neb solution Take 1 vial (3 mLs) by nebulization 4 times daily 90 mL 4    melatonin 5 MG tablet Take 1 tablet (5 mg) by mouth At Bedtime (Patient taking differently: Take 10 mg by mouth At Bedtime) 30 tablet 0    metoprolol succinate ER (TOPROL XL) 50 MG 24 hr tablet Take 1.5 tablets (75 mg) by mouth At Bedtime      mirabegron (MYRBETRIQ) 50 MG 24 hr tablet Take 1 tablet (50 mg) by mouth daily 90 tablet 1    multivitamin w/minerals (THERA-VIT-M) tablet Take 1 tablet by mouth daily      potassium chloride ER (K-TAB/KLOR-CON) 10 MEQ CR tablet Take 1 tablet by mouth daily      predniSONE (DELTASONE) 20 MG tablet       rivaroxaban ANTICOAGULANT (XARELTO) 15 MG TABS tablet Take 1 tablet (15 mg) by mouth At Bedtime      simvastatin (ZOCOR) 40 MG tablet Take 1 tablet (40 mg) by mouth At  Bedtime 90 tablet 3    solifenacin (VESICARE) 5 MG tablet Take 1 tablet (5 mg) by mouth daily 90 tablet 1    tamoxifen (NOLVADEX) 20 MG tablet TAKE 1 TABLET DAILY 90 tablet 3    traMADol (ULTRAM) 50 MG tablet Take 1 tablet (50 mg) by mouth 2 times daily 60 tablet 0    vitamin D3 (CHOLECALCIFEROL) 50 mcg (2000 units) tablet Take 1 tablet by mouth daily      Allergies   Allergen Reactions    Sulfa (Sulfonamide Antibiotics) [Sulfa Drugs] Rash     Headaches and dizziness.    Homeopathic Drugs [External Allergen Needs Reconciliation - See Comment] Unknown and Other (See Comments)     Comment: runny nose, Comment: runny nose    Mold Extracts [Molds & Smuts] Unknown    Morphine (Pf) [Morphine] Unknown     hallucinate    Lisinopril Cough, Unknown and Itching     Comment: cough, Comment: cough    Sulfacetamide Sodium [Sulfacetamide] Rash         Lab Results    Chemistry/lipid CBC Cardiac Enzymes/BNP/TSH/INR   Lab Results   Component Value Date    BUN 37 (H) 10/31/2022     10/31/2022    CO2 24 10/31/2022    Lab Results   Component Value Date    WBC 7.2 06/15/2022    HGB 11.2 (L) 06/15/2022    HCT 35.6 06/15/2022    MCV 90 06/15/2022     06/15/2022    Lab Results   Component Value Date    TROPONINI 0.03 04/07/2022    BNP 1,063 (H) 04/11/2022    INR 1.16 (H) 02/20/2021             This note has been dictated using voice recognition software. Any grammatical, typographical, or context distortions are unintentional and inherent to the software    30 minutes spent on the date of encounter doing chart review, review of outside records, review of test results, interpretation with above tests, patient visit, documentation and discussion with family.                  Thank you for allowing me to participate in the care of your patient.      Sincerely,     DELMIS Watson [facility]  cc:   No referring provider defined for this  encounter.

## 2023-04-17 NOTE — PROGRESS NOTES
Assessment/Recommendations   Assessment:    1. Nonischemic cardiomyopathy, heart failure with reduced ejection fraction, ejection fraction 35-40%, NYHA class III: Compensated.  She has chronic dyspnea on exertion.  She has trace edema which is chronic.  Her weight has increased slightly but states she is eating more.  2.  Severe COPD: Continues chronic oxygen.  She has been working with palliative care.  3.  Hypertension: Slightly elevated at 144/80    Plan:  1.  EKG needed due to new medication started by palliative   2.  Continue current medications  3.  Continue monitoring weights and following a low-salt diet    Irene León will follow up with Dr Chilel in 3 months and in the heart failure clinic in 1 year.     History of Present Illness/Subjective    Ms. Irene León is a 78 year old female seen at [facility] heart failure clinic today for continued follow-up.  Her daughter and  accompany her today.  She follows up for nonischemic cardiomyopathy, heart failure with reduced ejection fraction.  Her last echocardiogram was done April 2022 which showed ejection fraction of 35 to 40%.  She has a past medical history significant for hypertension, chronic kidney disease stage III, severe COPD on chronic oxygen, and history of breast cancer status post lumpectomy and radiation therapy.    Today, she states she is feeling well.  She has chronic dyspnea on exertion and lower extremity edema.  She denies lightheadedness, orthopnea, PND, palpitations, chest pain and abdominal fullness/bloating.      She is monitoring home weights which are stable.  She is following a low sodium diet.      ECHOCARDIOGRAM: 4/8/2022  Interpretation Summary     1. The left ventricle is normal in size. Left ventricular systolic performance  is moderately reduced. The ejection fraction is estimated to be 35-40%.  2. There is moderate global reduction in left ventricular systolic  performance.  3. There is abnormal  "septal motion possibly related to altered electrical  activation due to bundle branch block.  4. There is mild-moderate, to perhaps moderate, tricuspid insufficiency.  5. Normal right ventricular size with mildly reduced right ventricular  systolic performance.  6. There is mild right atrial enlargement.  7. Right ventricular systolic pressure relative to right atrial pressure is  mildly increased. The pulmonary artery pressure is estimated to be 45-50 mmHg  plus right atrial pressure (the IVC is mildly dilated).     Physical Examination Review of Systems   BP (!) 144/80 (BP Location: Left arm, Patient Position: Sitting, Cuff Size: Adult Regular)   Pulse 59   Resp 16   Ht 1.499 m (4' 11\")   Wt 49.2 kg (108 lb 8 oz)   SpO2 92%   BMI 21.91 kg/m    Body mass index is 21.91 kg/m .  Wt Readings from Last 3 Encounters:   04/17/23 49.2 kg (108 lb 8 oz)   10/11/22 47.2 kg (104 lb)   08/11/22 47.6 kg (105 lb)       General Appearance:   no acute distress   ENT/Mouth: Wearing mask   EYES:  no scleral icterus, normal conjunctivae   Neck: no thyromegaly   Chest/Lungs:   lungs are clear to auscultation, no rales or wheezing, equal chest wall expansion    Cardiovascular:   Regular. Normal first and second heart sounds with no murmurs, rubs, or gallops, trace edema bilaterally    Abdomen:  bowel sounds are present   Extremities: no cyanosis or clubbing   Skin: warm   Neurologic: no tremors     Psychiatric: alert and oriented x3                                              Medical History  Surgical History Family History Social History   Past Medical History:   Diagnosis Date     ANATOLIY (acute kidney injury) (H)      Allergic rhinitis      Arrhythmia      Atrial fibrillation with RVR (H)      Bacteremia      Breast cancer (H) 2017     Cardiomyopathy (H)      Centrilobular emphysema (H)      CHF (congestive heart failure) (H)      CKD (chronic kidney disease)      COPD (chronic obstructive pulmonary disease) (H)      Coronary " artery disease      Depression      Dysphagia      E. coli sepsis (H)      Factor 5 Leiden mutation, heterozygous (H)      GERD (gastroesophageal reflux disease)      Heart failure with reduced ejection fraction (H) 4/17/2023     Hx of radiation therapy 2017     Hyperlipidemia      Hypertension      Hypokalemia      Hypomagnesemia      Idiopathic cardiomyopathy (H)      Left hip pain 4/30/2014     OAB (overactive bladder)      Osteoporosis      Sacral insufficiency fracture      Sinusitis      Syncope      Urge incontinence      Vocal cord dysfunction     Past Surgical History:   Procedure Laterality Date     ARTHROPLASTY REVISION HIP Left      BIOPSY BREAST Right 2017     BLADDER SURGERY      bladder interstim with removal     CARDIAC CATHETERIZATION       CATARACT EXTRACTION Left 07/18/2017     IR ABDOMINAL AORTOGRAM  4/16/2003     IR AORTIC ARCH 4 VESSEL ANGIOGRAM  4/16/2003     IR MISCELLANEOUS PROCEDURE  4/16/2003     LUMPECTOMY BREAST Left 06/2017    x2     PICC DOUBLE LUMEN PLACEMENT  4/11/2022          PICC TRIPLE LUMEN PLACEMENT  4/7/2022          ZZC OPEN TX FEMORAL FRACTURE DISTAL MED/LAT CONDYLE Left 10/28/2015    Procedure: OPEN REDUCTION INTERNAL FIXATION LEFT  PROXIMAL FEMUR PERIPROSTHETIC FRACTURE;  Surgeon: Yovanny Albarran MD;  Location: [facility];  Service: Orthopedics    Family History   Problem Relation Age of Onset     Osteoporosis Other      Prostate Cancer Brother      Breast Cancer Maternal Aunt         age thought to be in her 70's-80's     Prostate Cancer Maternal Uncle     Social History     Socioeconomic History     Marital status:      Spouse name: Not on file     Number of children: Not on file     Years of education: Not on file     Highest education level: Not on file   Occupational History     Not on file   Tobacco Use     Smoking status: Former     Types: Cigarettes     Quit date: 10/28/2007     Years since quitting: 15.4     Smokeless tobacco: Former     Quit date:  9/6/2004   Vaping Use     Vaping status: Former   Substance and Sexual Activity     Alcohol use: No     Drug use: No     Sexual activity: Not on file   Other Topics Concern     Not on file   Social History Narrative     and lives in home with 3 flight of steps     Social Determinants of Health     Financial Resource Strain: Not on file   Food Insecurity: Not on file   Transportation Needs: Not on file   Physical Activity: Not on file   Stress: Not on file   Social Connections: Not on file   Intimate Partner Violence: Not on file   Housing Stability: Not on file          Medications  Allergies   Current Outpatient Medications   Medication Sig Dispense Refill     acetaminophen (TYLENOL) 500 MG tablet Take 1,000 mg by mouth every 8 hours as needed for mild pain or fever        acetylcysteine (MUCOMYST) 10 % nebulizer solution Inhale 4 mLs into the lungs every 4 hours       albuterol (PROAIR HFA/PROVENTIL HFA/VENTOLIN HFA) 108 (90 Base) MCG/ACT inhaler Inhale 2 puffs into the lungs       albuterol (PROAIR HFA/PROVENTIL HFA/VENTOLIN HFA) 108 (90 Base) MCG/ACT inhaler Inhale 2 puffs into the lungs every 6 hours (Patient taking differently: Inhale 2 puffs into the lungs every 6 hours as needed) 18 g 2     albuterol (PROVENTIL) (2.5 MG/3ML) 0.083% neb solution Take 1 vial (2.5 mg) by nebulization every 2 hours as needed for shortness of breath / dyspnea 90 mL 0     azelastine (ASTELIN) 0.1 % nasal spray Spray 1 spray in nostril       azithromycin (ZITHROMAX) 250 MG tablet        BREZTRI AEROSPHERE 160-9-4.8 MCG/ACT AERO inhaler Inhale 2 puffs into the lungs 2 times daily       buprenorphine (BUTRANS) 10 MCG/HR WK patch Place 1 patch onto the skin every 7 days 4 patch 0     CALCIUM-MAGNESIUM-ZINC PO Take 1 tablet by mouth daily       cetirizine (ZYRTEC) 10 MG tablet Take 10 mg by mouth daily as needed for allergies       chlorhexidine (PERIDEX) 0.12 % solution [CHLORHEXIDINE (PERIDEX) 0.12 % SOLUTION] Apply 15 mL to  the mouth or throat at bedtime as needed.        famotidine (PEPCID) 20 MG tablet Take 1 tablet (20 mg) by mouth At Bedtime 90 tablet 3     fluticasone (FLONASE) 50 MCG/ACT nasal spray Spray 1 spray into both nostrils daily 15.8 mL 0     Fluticasone-Umeclidin-Vilanterol (TRELEGY ELLIPTA) 200-62.5-25 MCG/INH oral inhaler Inhale 1 puff into the lungs At Bedtime       furosemide (LASIX) 20 MG tablet Take 1 tablet (20 mg) by mouth daily 90 tablet 1     gabapentin (NEURONTIN) 300 MG capsule Take 1 capsule (300 mg) by mouth 3 times daily 90 capsule 4     ipratropium (ATROVENT HFA) 17 MCG/ACT inhaler [ATROVENT HFA 17 MCG/ACTUATION INHALER] USE 2 INHALATIONS EVERY 6 HOURS (Patient taking differently: Inhale 2 puffs into the lungs every 6 hours as needed [ATROVENT HFA 17 MCG/ACTUATION INHALER] USE 2 INHALATIONS EVERY 6 HOURS) 77.4 g 3     ipratropium - albuterol 0.5 mg/2.5 mg/3 mL (DUONEB) 0.5-2.5 (3) MG/3ML neb solution Inhale 3 mLs into the lungs       ipratropium - albuterol 0.5 mg/2.5 mg/3 mL (DUONEB) 0.5-2.5 (3) MG/3ML neb solution Take 1 vial (3 mLs) by nebulization 4 times daily 90 mL 4     melatonin 5 MG tablet Take 1 tablet (5 mg) by mouth At Bedtime (Patient taking differently: Take 10 mg by mouth At Bedtime) 30 tablet 0     metoprolol succinate ER (TOPROL XL) 50 MG 24 hr tablet Take 1.5 tablets (75 mg) by mouth At Bedtime       mirabegron (MYRBETRIQ) 50 MG 24 hr tablet Take 1 tablet (50 mg) by mouth daily 90 tablet 1     multivitamin w/minerals (THERA-VIT-M) tablet Take 1 tablet by mouth daily       potassium chloride ER (K-TAB/KLOR-CON) 10 MEQ CR tablet Take 1 tablet by mouth daily       predniSONE (DELTASONE) 20 MG tablet        rivaroxaban ANTICOAGULANT (XARELTO) 15 MG TABS tablet Take 1 tablet (15 mg) by mouth At Bedtime       simvastatin (ZOCOR) 40 MG tablet Take 1 tablet (40 mg) by mouth At Bedtime 90 tablet 3     solifenacin (VESICARE) 5 MG tablet Take 1 tablet (5 mg) by mouth daily 90 tablet 1      tamoxifen (NOLVADEX) 20 MG tablet TAKE 1 TABLET DAILY 90 tablet 3     traMADol (ULTRAM) 50 MG tablet Take 1 tablet (50 mg) by mouth 2 times daily 60 tablet 0     vitamin D3 (CHOLECALCIFEROL) 50 mcg (2000 units) tablet Take 1 tablet by mouth daily      Allergies   Allergen Reactions     Sulfa (Sulfonamide Antibiotics) [Sulfa Drugs] Rash     Headaches and dizziness.     Homeopathic Drugs [External Allergen Needs Reconciliation - See Comment] Unknown and Other (See Comments)     Comment: runny nose, Comment: runny nose     Mold Extracts [Molds & Smuts] Unknown     Morphine (Pf) [Morphine] Unknown     hallucinate     Lisinopril Cough, Unknown and Itching     Comment: cough, Comment: cough     Sulfacetamide Sodium [Sulfacetamide] Rash         Lab Results    Chemistry/lipid CBC Cardiac Enzymes/BNP/TSH/INR   Lab Results   Component Value Date    BUN 37 (H) 10/31/2022     10/31/2022    CO2 24 10/31/2022    Lab Results   Component Value Date    WBC 7.2 06/15/2022    HGB 11.2 (L) 06/15/2022    HCT 35.6 06/15/2022    MCV 90 06/15/2022     06/15/2022    Lab Results   Component Value Date    TROPONINI 0.03 04/07/2022    BNP 1,063 (H) 04/11/2022    INR 1.16 (H) 02/20/2021             This note has been dictated using voice recognition software. Any grammatical, typographical, or context distortions are unintentional and inherent to the software    30 minutes spent on the date of encounter doing chart review, review of outside records, review of test results, interpretation with above tests, patient visit, documentation and discussion with family.

## 2023-04-18 NOTE — PATIENT INSTRUCTIONS
Make an appointment with your pulmonary clinic in June or July to follow-up on the emphysema treatment, and the lung nodule.    Continue on current medications at this time.    Follow-up with me on July 12 in clinic.

## 2023-04-18 NOTE — PROGRESS NOTES
Stephanie is a 78 year old who is being evaluated via a billable telephone visit.      What phone number would you like to be contacted at? [phone]  How would you like to obtain your AVS? [email or web address]    Distant Location (provider location):  On-site      Irene León   78 year old female    Date of Visit: 4/18/2023    Chief Complaint   Patient presents with     Follow Up     EKG done at cardiology. Had a couple Ct scans.     Subjective  Stephanie scheduled a telephone visit as she had canceled her other visit earlier this month.    Patient was plan to get an EKG after her pulmonary medicine doctor last month started her on Daliresp 250 mg a day and azithromycin.    Patient has a history of long QT but does not have a history of V. tach.    Patient got an EKG yesterday at cardiology which I did review.  Normal sinus rhythm with left bundle branch block but QTc was 505, slightly higher.    Past history of congestive heart failure with ejection fraction 35-40% with pulmonary hypertension.    History of severe COPD.  Patient was put on prednisone as well last month.    I did review the CT scan of the chest that her pulmonary clinic ordered on April 10.  There was severe emphysema.  There was a 1 cm right upper lobe new opacity.    Patient stated that the plan was to follow-up with the pulmonary clinic in 3 months and repeat CT scan.    Chronic phlegm, no acute change in shortness of breath.  No hemoptysis.  No new chest pain.    History of nonobstructive artery disease.  Negative stress test in 2016.  On simvastatin 40 mg.    Quit smoking over 14 years ago.  Had a cavitary pneumonia August 2022.    History of occipital stroke with high-grade intracranial stenosis.    History of chronic pain with previous T12 compression fracture.    Chronic atrial fibrillation on Xarelto.  Metoprolol.        History of stage I breast cancer lumpectomy and radiation in 2017.     PMHx:    Past Medical History:   Diagnosis Date     ANATOLIY (acute  kidney injury) (H)      Allergic rhinitis      Arrhythmia      Atrial fibrillation with RVR (H)      Bacteremia      Breast cancer (H) 2017     Cardiomyopathy (H)      Centrilobular emphysema (H)      CHF (congestive heart failure) (H)      CKD (chronic kidney disease)      COPD (chronic obstructive pulmonary disease) (H)      Coronary artery disease      Depression      Dysphagia      E. coli sepsis (H)      Factor 5 Leiden mutation, heterozygous (H)      GERD (gastroesophageal reflux disease)      Heart failure with reduced ejection fraction (H) 4/17/2023     Hx of radiation therapy 2017     Hyperlipidemia      Hypertension      Hypokalemia      Hypomagnesemia      Idiopathic cardiomyopathy (H)      Left hip pain 4/30/2014     OAB (overactive bladder)      Osteoporosis      Sacral insufficiency fracture      Sinusitis      Syncope      Urge incontinence      Vocal cord dysfunction      PSHx:    Past Surgical History:   Procedure Laterality Date     ARTHROPLASTY REVISION HIP Left      BIOPSY BREAST Right 2017     BLADDER SURGERY      bladder interstim with removal     CARDIAC CATHETERIZATION       CATARACT EXTRACTION Left 07/18/2017     IR ABDOMINAL AORTOGRAM  4/16/2003     IR AORTIC ARCH 4 VESSEL ANGIOGRAM  4/16/2003     IR MISCELLANEOUS PROCEDURE  4/16/2003     LUMPECTOMY BREAST Left 06/2017    x2     PICC DOUBLE LUMEN PLACEMENT  4/11/2022          PICC TRIPLE LUMEN PLACEMENT  4/7/2022          ZZC OPEN TX FEMORAL FRACTURE DISTAL MED/LAT CONDYLE Left 10/28/2015    Procedure: OPEN REDUCTION INTERNAL FIXATION LEFT  PROXIMAL FEMUR PERIPROSTHETIC FRACTURE;  Surgeon: Yovanny Albarran MD;  Location: [facility];  Service: Orthopedics     Immunizations:   Immunization History   Administered Date(s) Administered     COVID-19 Vaccine 12+ (Pfizer) 03/02/2021, 03/23/2021, 12/08/2021     COVID-19 Vaccine Bivalent Booster 12+ (Pfizer) 11/16/2022     FLU 6-35 months 11/03/2003     Flu 65+ Years 09/20/2017, 09/25/2018,  10/10/2019     Flu, Unspecified 10/01/2016, 10/01/2018     A3w5-27 Novel Flu 01/05/2010     Influenza (H1N1) 01/05/2010     Influenza (High Dose) 3 valent vaccine 10/30/2014, 10/01/2015, 11/26/2016, 09/25/2018, 10/10/2019, 10/19/2020     Influenza (IIV3) PF 11/03/2003, 11/08/2006, 10/24/2007, 11/28/2008, 10/09/2009, 10/22/2010, 01/26/2012, 10/03/2012, 11/01/2013     Influenza Vaccine 65+ (Fluzone HD) 10/19/2020, 12/15/2020, 11/16/2022     Mantoux Tuberculin Skin Test 12/13/2007, 10/24/2020, 11/03/2020     Pneumo Conj 13-V (2010&after) 05/11/2015     Pneumococcal 23 valent 11/24/1997, 11/21/2007, 05/17/2017     TD,PF 7+ (Tenivac) 07/20/2004     TDAP (Adacel,Boostrix) 02/12/2016     TDAP Vaccine (Boostrix) 02/12/2016     Zoster recombinant adjuvanted (SHINGRIX) 07/31/2020     Zoster vaccine, live 02/18/2009, 10/20/2014       ROS A comprehensive review of systems was performed and was otherwise negative    Medications, allergies, and problem list were reviewed and updated    Exam  There were no vitals taken for this visit.  Telephone    Assessment/Plan  1. Pulmonary emphysema, unspecified emphysema type (H)  Severe but stabilized this last month, now on Daliresp and azithromycin, nebulizers and inhalers.    Was put on prednisone, due managed by her pulmonary clinic.  I did warn patient about risk of long-term prednisone, especially with history of compression fracture.  But weaning down on prednisone will occur through her pulmonary clinic.        2. Nodule of upper lobe of right lung  Patient is plan to follow-up in 3 months, approximately June or July with pulmonary clinic to repeat chest CT scan.  I did discuss this with patient, and patient does know that there is a possibility that that could be cancer.  She did not want to proceed with a biopsy or PET scan at this time.    3. History of breast cancer  2017 with lumpectomy and radiation.  Negative mammogram due in June, follow-up with oncology in June.    4.  Chronic systolic congestive heart failure (H)  Stable heart echo last year.  Patient did not report worsening heart failure.  Lasix 20 mg daily.    Mild chronic renal insufficiency since sepsis episode March 2021 with ATN.  October 2022 creatinine 1.28    5. Coronary artery disease due to calcified coronary lesion  Mild nonobstructing.  No new chest pain reports.  On simvastatin.    6. Long QT interval  I did discuss this with patient.  Mild increase in QT.  I discussed that her medications may increase risk of ventricular tachycardia.  But given her severe COPD, benefit likely outweighs risk in this condition, she has not had previous V. tach.    Discussed further emphysema treatment with her pulmonologist    7. Chronic atrial fibrillation (H)  On Xarelto.  Metoprolol.  Also history of DVT.    Previous spine compression fracture.  History of chronic pain.    History of occipital stroke with high-grade intracranial stenosis.  Continue simvastatin.      Return in about 3 months (around 7/12/2023) for Clinic follow-up.   Patient Instructions   Make an appointment with your pulmonary clinic in June or July to follow-up on the emphysema treatment, and the lung nodule.    Continue on current medications at this time.    Follow-up with me on July 12 in clinic.    Pankaj Montanez MD, MD        Current Outpatient Medications   Medication Sig Dispense Refill     acetaminophen (TYLENOL) 500 MG tablet Take 1,000 mg by mouth every 8 hours as needed for mild pain or fever        albuterol (PROAIR HFA/PROVENTIL HFA/VENTOLIN HFA) 108 (90 Base) MCG/ACT inhaler Inhale 2 puffs into the lungs       albuterol (PROAIR HFA/PROVENTIL HFA/VENTOLIN HFA) 108 (90 Base) MCG/ACT inhaler Inhale 2 puffs into the lungs every 6 hours (Patient taking differently: Inhale 2 puffs into the lungs every 6 hours as needed) 18 g 2     azelastine (ASTELIN) 0.1 % nasal spray Spray 1 spray in nostril       azithromycin (ZITHROMAX) 250 MG tablet        BREZTRI  AEROSPHERE 160-9-4.8 MCG/ACT AERO inhaler Inhale 2 puffs into the lungs 2 times daily       buprenorphine (BUTRANS) 10 MCG/HR WK patch Place 1 patch onto the skin every 7 days 4 patch 0     CALCIUM-MAGNESIUM-ZINC PO Take 1 tablet by mouth daily       furosemide (LASIX) 20 MG tablet Take 1 tablet (20 mg) by mouth daily 90 tablet 1     gabapentin (NEURONTIN) 300 MG capsule Take 1 capsule (300 mg) by mouth 3 times daily 90 capsule 4     ipratropium (ATROVENT HFA) 17 MCG/ACT inhaler [ATROVENT HFA 17 MCG/ACTUATION INHALER] USE 2 INHALATIONS EVERY 6 HOURS (Patient taking differently: Inhale 2 puffs into the lungs every 6 hours as needed [ATROVENT HFA 17 MCG/ACTUATION INHALER] USE 2 INHALATIONS EVERY 6 HOURS) 77.4 g 3     melatonin 5 MG tablet Take 1 tablet (5 mg) by mouth At Bedtime (Patient taking differently: Take 10 mg by mouth At Bedtime) 30 tablet 0     metoprolol succinate ER (TOPROL XL) 50 MG 24 hr tablet Take 1.5 tablets (75 mg) by mouth At Bedtime       mirabegron (MYRBETRIQ) 50 MG 24 hr tablet Take 1 tablet (50 mg) by mouth daily 90 tablet 1     multivitamin w/minerals (THERA-VIT-M) tablet Take 1 tablet by mouth daily       potassium chloride ER (K-TAB/KLOR-CON) 10 MEQ CR tablet Take 1 tablet by mouth daily       predniSONE (DELTASONE) 20 MG tablet        rivaroxaban ANTICOAGULANT (XARELTO) 15 MG TABS tablet Take 1 tablet (15 mg) by mouth At Bedtime       simvastatin (ZOCOR) 40 MG tablet Take 1 tablet (40 mg) by mouth At Bedtime 90 tablet 3     solifenacin (VESICARE) 5 MG tablet Take 1 tablet (5 mg) by mouth daily 90 tablet 1     tamoxifen (NOLVADEX) 20 MG tablet TAKE 1 TABLET DAILY 90 tablet 3     traMADol (ULTRAM) 50 MG tablet Take 1 tablet (50 mg) by mouth 2 times daily 60 tablet 0     vitamin D3 (CHOLECALCIFEROL) 50 mcg (2000 units) tablet Take 1 tablet by mouth daily       acetylcysteine (MUCOMYST) 10 % nebulizer solution Inhale 4 mLs into the lungs every 4 hours (Patient not taking: Reported on 4/18/2023)        albuterol (PROVENTIL) (2.5 MG/3ML) 0.083% neb solution Take 1 vial (2.5 mg) by nebulization every 2 hours as needed for shortness of breath / dyspnea (Patient not taking: Reported on 4/18/2023) 90 mL 0     cetirizine (ZYRTEC) 10 MG tablet Take 10 mg by mouth daily as needed for allergies (Patient not taking: Reported on 4/18/2023)       chlorhexidine (PERIDEX) 0.12 % solution [CHLORHEXIDINE (PERIDEX) 0.12 % SOLUTION] Apply 15 mL to the mouth or throat at bedtime as needed.        famotidine (PEPCID) 20 MG tablet Take 1 tablet (20 mg) by mouth At Bedtime (Patient not taking: Reported on 4/18/2023) 90 tablet 3     fluticasone (FLONASE) 50 MCG/ACT nasal spray Spray 1 spray into both nostrils daily (Patient not taking: Reported on 4/18/2023) 15.8 mL 0     Fluticasone-Umeclidin-Vilanterol (TRELEGY ELLIPTA) 200-62.5-25 MCG/INH oral inhaler Inhale 1 puff into the lungs At Bedtime (Patient not taking: Reported on 4/18/2023)       ipratropium - albuterol 0.5 mg/2.5 mg/3 mL (DUONEB) 0.5-2.5 (3) MG/3ML neb solution Inhale 3 mLs into the lungs (Patient not taking: Reported on 4/18/2023)       ipratropium - albuterol 0.5 mg/2.5 mg/3 mL (DUONEB) 0.5-2.5 (3) MG/3ML neb solution Take 1 vial (3 mLs) by nebulization 4 times daily (Patient not taking: Reported on 4/18/2023) 90 mL 4     Allergies   Allergen Reactions     Sulfa (Sulfonamide Antibiotics) [Sulfa Drugs] Rash     Headaches and dizziness.     Homeopathic Drugs [External Allergen Needs Reconciliation - See Comment] Unknown and Other (See Comments)     Comment: runny nose, Comment: runny nose     Mold Extracts [Molds & Smuts] Unknown     Morphine (Pf) [Morphine] Unknown     hallucinate     Lisinopril Cough, Unknown and Itching     Comment: cough, Comment: cough     Sulfacetamide Sodium [Sulfacetamide] Rash     Social History     Tobacco Use     Smoking status: Former     Types: Cigarettes     Quit date: 10/28/2007     Years since quitting: 15.4     Smokeless tobacco:  Former     Quit date: 9/6/2004   Vaping Use     Vaping status: Former   Substance Use Topics     Alcohol use: No     Drug use: No             Subjective   Stephanie is a 78 year old, presenting for the following health issues:  Follow Up (EKG done at cardiology. Had a couple Ct scans.)        4/18/2023     2:35 PM   Additional Questions   Roomed by Yamini DUGGAN   Accompanied by asmita         4/18/2023     2:35 PM   Patient Reported Additional Medications   Patient reports taking the following new medications yes     HPI             Review of Systems         Objective           Vitals:  No vitals were obtained today due to virtual visit.    Physical Exam                   Phone call duration: 22 minutes

## 2023-04-25 NOTE — TELEPHONE ENCOUNTER
Received voicemail from patient requesting refill of tramadol.     Last refill: 3/7/23  Last office visit: 3/16/23  Scheduled for follow up 5/18/23     Will route request to MD for review.     Reviewed [place]  Report.

## 2023-04-25 NOTE — PROGRESS NOTES
See Mychart dialogue with RNCC.  I ordered butrans 15 mcg/hour patch today after partial response to 10 mcg/hour.  Stephanie also has a QTc that was 505 msec earlier this month and I ordered a follow up ECG in two weeks to monitor it.    Tobi Miguel MD MS FAAFP  Text me via [facility].  [facility]  Office [phone]  Fax [phone]

## 2023-05-15 NOTE — TELEPHONE ENCOUNTER
Received voicemail from patient requesting refill of tramadol. Pt requesting a dose increase in the butrans patch. She is due to change her patch tomorrow and is asking for the increase with the patch change.     Last refill of tramadol: 4/26/23  Last refill of butrans: 5/3/23 (15 mcg patch)  Last office visit: 3/16/23  Scheduled for follow up 5/18/23     Will route request to MD for review.     Reviewed [place]  Report.

## 2023-05-16 NOTE — TELEPHONE ENCOUNTER
Call placed to patient to let her know that the butrans patch dose cannot be increased until she has a follow up EKG. Reviewed with her that her QT interval might prevent Dr. Miguel from making further adjustments. She has a follow up visit with Dr. Miguel on Thursday. He will discuss making other changes to her medications at that time. She verbalized understanding and will let us know when the EKG has been done.    NEYMAR Yoder, RN  Palliative Care Nurse Clinician    [phone] (Direct)  [phone] (Main)  [phone] (Appointment Scheduling)

## 2023-05-17 NOTE — TELEPHONE ENCOUNTER
I spoke with Stephanie. She is feeling well today and at baseline as far as her breathing is concerned. She is able to get and take her medications and is compliant in taking them. Reviewed COPD Action Plan. Stephanie had no problems or questions for me today.      Sharon Ryan, RT, TTS, Chronic Pulmonary Disease Specialist

## 2023-05-18 NOTE — NURSING NOTE
"Patient declined medication review due to no changes.       Is the patient currently in the state of [place]? YES    Visit mode:VIDEO    If the visit is dropped, the patient can be reconnected by: VIDEO VISIT: Text to cell phone: [phone]    Will anyone else be joining the visit? NO      How would you like to obtain your AVS? MyChart    Are changes needed to the allergy or medication list? NO    Reason for visit: Video Visit (Patient said, \"Pain management because things aren't working very well and wondering about getting a different kind because Tramadol isnt working and enough\")      Alia Gordon, VF  "

## 2023-05-18 NOTE — PATIENT INSTRUCTIONS
It was good to see you today, Stephanie.    Here are the things we talked about:  Stop the Butrans patch when you start the dilaudid  Use dilaudid 1 ml (= 1 mg) by mouth three times a day  Start using Tramadol 25 mg by mouth twice a day and then a week later drop one of the daily doses and then a week later stop it all together;   OK to cancel EKG appointment  Someone from the state will reach out about the medical cannabis program.    Someone from the team will reach out to schedule a follow up appointment in 6 weeks and call sooner, if needed       How to get a hold of us:  For non-urgent matters, MyChart works best.    For more urgent matters, or if you prefer not to use MyChart, call our clinic nurse coordinator Anabela Hillman RN at [phone]    We have an on-call number for evenings and weekends. Please call this only if you are having uncontrolled symptoms or serious side effects from your medicines: [phone].     For refills, please give us a week (5 working days) notice. We don't always have providers available everyday to do refills. If you call the day you run out of your medicine, we may not be able to refill it in time, so call 5 days in advance!    Tobi Miguel MD MS FAAFP CAQHPM  [facility]  Office [phone]  Fax [phone]
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
## 2023-06-01 NOTE — TELEPHONE ENCOUNTER
"Routing refill request to provider for review/approval because:  Medication is reported/historical  BP    Last Written Prescription Date:  10/11/22 - entered  Last office visit provider:  4/18/23 VV with yogi    Requested Prescriptions   Pending Prescriptions Disp Refills     metoprolol succinate ER (TOPROL XL) 50 MG 24 hr tablet       Sig: Take 1.5 tablets (75 mg) by mouth At Bedtime       Beta-Blockers Protocol Failed - 6/1/2023  4:22 PM        Failed - Blood pressure under 140/90 in past 12 months     BP Readings from Last 3 Encounters:   04/17/23 (!) 144/80   11/16/22 122/62   10/11/22 97/50                 Passed - Patient is age 6 or older        Passed - Recent (12 mo) or future (30 days) visit within the authorizing provider's specialty     Patient has had an office visit with the authorizing provider or a provider within the authorizing providers department within the previous 12 mos or has a future within next 30 days. See \"Patient Info\" tab in inbasket, or \"Choose Columns\" in Meds & Orders section of the refill encounter.              Passed - Medication is active on med list             SHERRIE MOFFETT RN 06/01/23 4:22 PM  "

## 2023-06-01 NOTE — TELEPHONE ENCOUNTER
"Routing refill request to provider for review/approval because:  A break in medication  LDL    Last Written Prescription Date:  9/23/2021  Last Fill Quantity: 90,  # refills: 3   Last office visit provider:  4/8/2023     Requested Prescriptions   Pending Prescriptions Disp Refills     simvastatin (ZOCOR) 40 MG tablet 90 tablet 3     Sig: Take 1 tablet (40 mg) by mouth At Bedtime       Statins Protocol Failed - 5/31/2023  9:20 AM        Failed - LDL on file in past 12 months     No lab results found.          Passed - No abnormal creatine kinase in past 12 months     No lab results found.             Passed - Recent (12 mo) or future (30 days) visit within the authorizing provider's specialty     Patient has had an office visit with the authorizing provider or a provider within the authorizing providers department within the previous 12 mos or has a future within next 30 days. See \"Patient Info\" tab in inbasket, or \"Choose Columns\" in Meds & Orders section of the refill encounter.              Passed - Medication is active on med list        Passed - Patient is age 18 or older        Passed - No active pregnancy on record        Passed - No positive pregnancy test in past 12 months             Karuna Mercedes RN 06/01/23 4:14 PM      "

## 2023-06-17 PROBLEM — J18.9 ACUTE PNEUMONIA: Status: ACTIVE | Noted: 2023-01-01

## 2023-06-17 PROBLEM — D72.829 LEUKOCYTOSIS, UNSPECIFIED TYPE: Status: ACTIVE | Noted: 2023-01-01

## 2023-06-17 NOTE — ED PROVIDER NOTES
EMERGENCY DEPARTMENT ENCOUNTER      NAME: Irene León  AGE: 78 year old female  YOB: 1945  MRN: [id number]  EVALUATION DATE & TIME: 6/17/2023 11:56 AM    PCP: Pankaj Montanez    ED PROVIDER: Rafa Machado MD    Chief Complaint   Patient presents with     Altered Mental Status     FINAL IMPRESSION:  1. Acute pneumonia    2. Leukocytosis, unspecified type    3. COPD exacerbation (H)        ED COURSE & MEDICAL DECISION MAKING:    Pertinent Labs & Imaging studies reviewed. (See chart for details)  78 year old female presents to the Emergency Department for evaluation of effusion shortness of breath and multiple symptoms.  The majority of the history conducted on discussion with EMS as the patient did have some confusion and the reliability of her history was in question.  EMS reporting that patient family called EMS to the house for the patient was with increased confusion.  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  Increase shortness of breath and altered mental status.  On examination patient noted to have a temperature of 100.1.  She was tachypneic she was setting up approximately 90% on 4 L which she is on chronically.  She was tachypneic with decreased breath sounds throughout.  Abdominal examination benign.  She had lower extremity swelling and some slight erythema primarily on the dorsum of the left foot but capillary fill appear to be intact.  With a temperature of 100.1 infectious etiology certainly at the top of the differential with considerations for pneumonia or urinary tract infection or cellulitis of the leg with associated encephalopathic response sepsis work-up initiated.  Also pursuing cardiopulmonary evaluation given her increased dyspnea and shortness of breath in the setting of end-stage COPD.  Overall plan of care for admission the hospital.    1:43 PM  Good elevation of white blood cell count.  Chest x-ray abnormal.  There was some wheezing and tachypnea noted on  examination so I did administer steroids and ultimately antibiotics with return to the white count out of concern for infection in the setting of her elevated temperature here in the emergency department.  We do still have a urinalysis pending for evaluation of UTI I cannot straight cath the patient as she is currently in the hallway secondary to boarding and capacity issues.  We will continue to work to obtain a urinary sample but have not waited initiated antibiotics for treatment of acute infection.  I do feel based on the constellation of issues identified patient will require admission to hospital for further care and stabilization.  I updated the patient grandson and left a message for the daughter by phone.    11:55 AM I met with the patient, obtained history, performed an initial exam, and discussed options and plan for diagnostics and treatment here in the ED.   1:40 PM I spoke to the hospitalist, Dr. Toney Galindo.       Medical Decision Making    History:    Supplemental history from: Documented in chart, if applicable and EMS    External Record(s) reviewed: Documented in chart, if applicable. and Outpatient Record: 06/13/23 [facility] Clinic Visit    Work Up:    Chart documentation includes differential considered and any EKGs or imaging independently interpreted by provider, where specified.    In additional to work up documented, I considered the following work up: Documented in chart, if applicable.    External consultation:    Discussion of management with another provider: Documented in chart, if applicable    Complicating factors:    Care impacted by chronic illness: Chronic Kidney Disease, Chronic Lung Disease, Hyperlipidemia, Hypertension and Other: Atrial fibrillation    Care affected by social determinants of health: N/A    Disposition considerations: Admit.    At the conclusion of the encounter I discussed the results of all of the tests and the disposition. The questions were answered. The  patient or family acknowledged understanding and was agreeable with the care plan.     MEDICATIONS GIVEN IN THE EMERGENCY:  Medications   cefTRIAXone (ROCEPHIN) 1 g vial to attach to  mL bag for ADULTS or NS 50 mL bag for PEDS (has no administration in time range)   doxycycline (VIBRAMYCIN) 100 mg vial to attach to  mL bag (has no administration in time range)   0.9% sodium chloride BOLUS (0 mLs Intravenous Stopped 6/17/23 1313)   acetaminophen (TYLENOL) tablet 975 mg (975 mg Oral $Given 6/17/23 1227)   methylPREDNISolone sodium succinate (solu-MEDROL) injection 125 mg (125 mg Intravenous $Given 6/17/23 1226)       NEW PRESCRIPTIONS STARTED AT TODAY'S ER VISIT  New Prescriptions    No medications on file          =================================================================    HPI  Patient information was obtained from: the patient and EMS  Use of : N/A       Irene León is a 78 year old female with a pertinent history of hypertension, hyperlipidemia, atrial fibrillation, COPD and chronic kidney disease who presents to this ED for evaluation of altered mental status. EMS reports that the patient appeared altered and confused when they found her and that she did not want to go to the hospital. They report that the patient was upset and did not want to answer questions. They state that she was not having chest pain and that she has an irregular heart beat.     The patient states that she is short of breath although she does not have any chest pain. Additionally, she notes that she is thirsty and that this began today, 06/17. She states that both of her feet hurt. She endorses being on oxygen for the past five years and lives with her .     Per chart review, the patient was seen on 06/13/23 at [facility] for evaluation of COPD management. The patient was concerned about chest tightness and bloating of her abdomen. Full Pulmonary Function Test was abnormal.  Spirometry was consistent with severe obstructive ventilatory defect, reversibility, hyperinflation, air-trapping. Diffusion capacity when corrected for hemoglobin was severely reduced. Moderate airflow obstruction, expiratory flows stable, flow volume loop consistent with obstruction. The patient was recommended to continue using azithromycin three times a week and was permitted to take prednisone 5 mg every other day and monitor symptoms for two weeks. Plan was to follow up in two months and the patient was ultimately discharged.      REVIEW OF SYSTEMS   Review of Systems   Respiratory: Positive for shortness of breath.    Cardiovascular: Negative for chest pain.   Musculoskeletal:        Positive for foot pain.   All other systems reviewed and are negative.      PAST MEDICAL HISTORY:  Past Medical History:   Diagnosis Date     ANATOLIY (acute kidney injury) (H)      Allergic rhinitis      Arrhythmia      Atrial fibrillation with RVR (H)      Bacteremia      Breast cancer (H) 2017     Cardiomyopathy (H)      Centrilobular emphysema (H)      CHF (congestive heart failure) (H)      CKD (chronic kidney disease)      COPD (chronic obstructive pulmonary disease) (H)      Coronary artery disease      Depression      Dysphagia      E. coli sepsis (H)      Factor 5 Leiden mutation, heterozygous (H)      GERD (gastroesophageal reflux disease)      Heart failure with reduced ejection fraction (H) 4/17/2023     Hx of radiation therapy 2017     Hyperlipidemia      Hypertension      Hypokalemia      Hypomagnesemia      Idiopathic cardiomyopathy (H)      Left hip pain 4/30/2014     OAB (overactive bladder)      Osteoporosis      Sacral insufficiency fracture      Sinusitis      Syncope      Urge incontinence      Vocal cord dysfunction        PAST SURGICAL HISTORY:  Past Surgical History:   Procedure Laterality Date     ARTHROPLASTY REVISION HIP Left      BIOPSY BREAST Right 2017     BLADDER SURGERY      bladder interstim with  removal     CARDIAC CATHETERIZATION       CATARACT EXTRACTION Left 07/18/2017     IR ABDOMINAL AORTOGRAM  4/16/2003     IR AORTIC ARCH 4 VESSEL ANGIOGRAM  4/16/2003     IR MISCELLANEOUS PROCEDURE  4/16/2003     LUMPECTOMY BREAST Left 06/2017    x2     PICC DOUBLE LUMEN PLACEMENT  4/11/2022          PICC TRIPLE LUMEN PLACEMENT  4/7/2022          ZZC OPEN TX FEMORAL FRACTURE DISTAL MED/LAT CONDYLE Left 10/28/2015    Procedure: OPEN REDUCTION INTERNAL FIXATION LEFT  PROXIMAL FEMUR PERIPROSTHETIC FRACTURE;  Surgeon: Yovanny Albarran MD;  Location: [facility] OR;  Service: Orthopedics           CURRENT MEDICATIONS:    acetaminophen (TYLENOL) 500 MG tablet  acetylcysteine (MUCOMYST) 10 % nebulizer solution  albuterol (PROAIR HFA/PROVENTIL HFA/VENTOLIN HFA) 108 (90 Base) MCG/ACT inhaler  albuterol (PROAIR HFA/PROVENTIL HFA/VENTOLIN HFA) 108 (90 Base) MCG/ACT inhaler  albuterol (PROVENTIL) (2.5 MG/3ML) 0.083% neb solution  azelastine (ASTELIN) 0.1 % nasal spray  azithromycin (ZITHROMAX) 250 MG tablet  BREZTRI AEROSPHERE 160-9-4.8 MCG/ACT AERO inhaler  CALCIUM-MAGNESIUM-ZINC PO  cetirizine (ZYRTEC) 10 MG tablet  chlorhexidine (PERIDEX) 0.12 % solution  famotidine (PEPCID) 20 MG tablet  fluticasone (FLONASE) 50 MCG/ACT nasal spray  Fluticasone-Umeclidin-Vilanterol (TRELEGY ELLIPTA) 200-62.5-25 MCG/INH oral inhaler  furosemide (LASIX) 20 MG tablet  gabapentin (NEURONTIN) 300 MG capsule  HYDROmorphone, STANDARD CONC, (DILAUDID) 1 MG/ML oral solution  ipratropium (ATROVENT HFA) 17 MCG/ACT inhaler  ipratropium - albuterol 0.5 mg/2.5 mg/3 mL (DUONEB) 0.5-2.5 (3) MG/3ML neb solution  ipratropium - albuterol 0.5 mg/2.5 mg/3 mL (DUONEB) 0.5-2.5 (3) MG/3ML neb solution  melatonin 5 MG tablet  metoprolol succinate ER (TOPROL XL) 50 MG 24 hr tablet  mirabegron (MYRBETRIQ) 50 MG 24 hr tablet  multivitamin w/minerals (THERA-VIT-M) tablet  potassium chloride ER (K-TAB/KLOR-CON) 10 MEQ CR tablet  predniSONE (DELTASONE) 20 MG  "tablet  rivaroxaban ANTICOAGULANT (XARELTO) 15 MG TABS tablet  simvastatin (ZOCOR) 40 MG tablet  solifenacin (VESICARE) 5 MG tablet  tamoxifen (NOLVADEX) 20 MG tablet  vitamin D3 (CHOLECALCIFEROL) 50 mcg (2000 units) tablet        ALLERGIES:  Allergies   Allergen Reactions     Sulfa (Sulfonamide Antibiotics) [Sulfa Antibiotics] Rash     Headaches and dizziness.     Homeopathic Drugs [External Allergen Needs Reconciliation - See Comment] Unknown and Other (See Comments)     Comment: runny nose, Comment: runny nose     Mold Extracts [Molds & Smuts] Unknown     Morphine (Pf) [Morphine] Unknown     hallucinate     Lisinopril Cough, Unknown and Itching     Comment: cough, Comment: cough     Sulfacetamide Sodium [Sulfacetamide] Rash       FAMILY HISTORY:  Family History   Problem Relation Age of Onset     Osteoporosis Other      Prostate Cancer Brother      Breast Cancer Maternal Aunt         age thought to be in her 70's-80's     Prostate Cancer Maternal Uncle        SOCIAL HISTORY:   Social History     Socioeconomic History     Marital status:    Tobacco Use     Smoking status: Former     Types: Cigarettes     Quit date: 10/28/2007     Years since quitting: 15.6     Smokeless tobacco: Former     Quit date: 9/6/2004   Vaping Use     Vaping status: Former   Substance and Sexual Activity     Alcohol use: No     Drug use: No   Social History Narrative     and lives in home with 3 flight of steps       VITALS:  /63   Pulse 82   Temp 100.1  F (37.8  C) (Temporal)   Resp 14   Ht 1.499 m (4' 11\")   Wt 47.6 kg (105 lb)   SpO2 94%   BMI 21.21 kg/m      PHYSICAL EXAM    PHYSICAL EXAM    Constitutional: [place] ill-appearing adult female, appears to be in acute distress.  Tachypneic.  Altered.  Agitated.  HENT: Normocephalic, Atraumatic, Bilateral external ears normal, Oropharynx normal, mucous membranes moist, Nose normal. Neck-  Normal range of motion, No tenderness, Supple, No stridor.   Eyes: PERRL, " EOMI, Conjunctiva normal, No discharge.   Respiratory: Tachypneic.  Not in respiratory distress.  Decreased breath sounds at the bases.  Scattered expiratory wheezes mild.  Cardiovascular: Normal heart rate, Regular rhythm, No murmurs Chest wall nontender.    GI:  Soft, No tenderness, No masses, No flank tenderness. No rebound or guarding.  : No cva tenderness    Musculoskeletal: Lower extremity edema noted.  There is palpation throughout and nonfocal.  Integument: Mild erythema noted to the foot on the left side.  Neurologic: Patient is confused & oriented x 3, Normal motor function, Normal sensory function, No focal deficits noted.   Psychiatric: Agitated.       LAB:  All pertinent labs reviewed and interpreted.  Results for orders placed or performed during the hospital encounter of 06/17/23   Head CT w/o contrast    Impression    IMPRESSION:  1.  No CT evidence for acute intracranial process.  2.  Brain atrophy and presumed chronic microvascular ischemic changes as above.  3.  Chronic left temporal occipital infarct, similar prior.   Chest XR,  PA & LAT    Impression    IMPRESSION: Mild bandlike opacities in the left upper lung are likely due to scarring from the more extensive infiltrates and consolidation in this location on 04/10/2022. Additional patchy interstitial opacities throughout both lungs are likely due to   scarring. No definite new infiltrates. Normal heart size and pulmonary vascularity. Old healed bilateral rib fractures. No significant pleural effusions.   Result Value Ref Range    INR 1.28 (H) 0.85 - 1.15   Comprehensive metabolic panel   Result Value Ref Range    Sodium 136 136 - 145 mmol/L    Potassium 3.9 3.5 - 5.0 mmol/L    Chloride 94 (L) 98 - 107 mmol/L    Carbon Dioxide (CO2) 29 22 - 31 mmol/L    Anion Gap 13 5 - 18 mmol/L    Urea Nitrogen 21 8 - 28 mg/dL    Creatinine 1.94 (H) 0.60 - 1.10 mg/dL    Calcium 9.8 8.5 - 10.5 mg/dL    Glucose 93 70 - 125 mg/dL    Alkaline Phosphatase 71 45  - 120 U/L    AST 23 0 - 40 U/L    ALT 10 0 - 45 U/L    Protein Total 7.7 6.0 - 8.0 g/dL    Albumin 3.2 (L) 3.5 - 5.0 g/dL    Bilirubin Total 0.9 0.0 - 1.0 mg/dL    GFR Estimate 26 (L) >60 mL/min/1.73m2   Result Value Ref Range    Lipase 32 0 - 52 U/L   Lactic acid whole blood   Result Value Ref Range    Lactic Acid 1.7 0.7 - 2.0 mmol/L   Result Value Ref Range    Troponin I 0.04 0.00 - 0.29 ng/mL   Result Value Ref Range    Magnesium 2.1 1.8 - 2.6 mg/dL   B-Type Natriuretic Peptide ([facility] Only)   Result Value Ref Range    BNP 1,167 (H) 0 - 147 pg/mL   Blood gas venous   Result Value Ref Range    pH Venous 7.42 7.35 - 7.45    pCO2 Venous 51 (H) 35 - 50 mm Hg    pO2 Venous 17 (L) 25 - 47 mm Hg    Bicarbonate Venous 33 (H) 24 - 30 mmol/L    Base Excess/Deficit (+/-) 8.3   mmol/L    Oxyhemoglobin Venous 21.9 (L) 70.0 - 75.0 %    O2 Sat, Venous 22.2 (L) 70.0 - 75.0 %   CBC with platelets and differential   Result Value Ref Range    WBC Count 26.4 (H) 4.0 - 11.0 10e3/uL    RBC Count 4.82 3.80 - 5.20 10e6/uL    Hemoglobin 13.7 11.7 - 15.7 g/dL    Hematocrit 42.7 35.0 - 47.0 %    MCV 89 78 - 100 fL    MCH 28.4 26.5 - 33.0 pg    MCHC 32.1 31.5 - 36.5 g/dL    RDW 14.3 10.0 - 15.0 %    Platelet Count 210 150 - 450 10e3/uL    % Neutrophils 88 %    % Lymphocytes 3 %    % Monocytes 7 %    % Eosinophils 0 %    % Basophils 1 %    % Immature Granulocytes 1 %    NRBCs per 100 WBC 0 <1 /100    Absolute Neutrophils 23.6 (H) 1.6 - 8.3 10e3/uL    Absolute Lymphocytes 0.7 (L) 0.8 - 5.3 10e3/uL    Absolute Monocytes 1.8 (H) 0.0 - 1.3 10e3/uL    Absolute Eosinophils 0.0 0.0 - 0.7 10e3/uL    Absolute Basophils 0.1 0.0 - 0.2 10e3/uL    Absolute Immature Granulocytes 0.2 <=0.4 10e3/uL    Absolute NRBCs 0.0 10e3/uL       RADIOLOGY:  Reviewed all pertinent imaging. Please see official radiology report.  Chest XR,  PA & LAT   Final Result   IMPRESSION: Mild bandlike opacities in the left upper lung are likely due to scarring from the more  extensive infiltrates and consolidation in this location on 04/10/2022. Additional patchy interstitial opacities throughout both lungs are likely due to    scarring. No definite new infiltrates. Normal heart size and pulmonary vascularity. Old healed bilateral rib fractures. No significant pleural effusions.      Head CT w/o contrast   Final Result   IMPRESSION:   1.  No CT evidence for acute intracranial process.   2.  Brain atrophy and presumed chronic microvascular ischemic changes as above.   3.  Chronic left temporal occipital infarct, similar prior.          EKG:    Performed at: 12:06 PM, 06/17/23    Impression: Sinus rhythm with occassional premature ventricular complexes and premature atrial complexes. Possible left atrial enlargement. Left axis deviation. Left bundle branch block. Abnormal ECG.    Rate: 86 BPM  Rhythm: Sinus  Axis: 114  NJ Interval: 132 ms  QRS Interval: 130 ms  QTc Interval: 524 ms  ST Changes:   Comparison: When compared with ECG of 04/17/23, premature atrial complexes are now present.     I have independently reviewed and interpreted the EKG(s) documented above.    I, Thalia Angelito, am serving as a scribe to document services personally performed by Rafa Machado MD based on my observation and the provider's statements to me. I, Rafa Machado MD, attest that Thalia Eaton is acting in a scribe capacity, has observed my performance of the services and has documented them in accordance with my direction.    Rafa Machado MD  [facility] EMERGENCY ROOM  [address] [id number] [phone]     Rafa Machado MD  06/17/23 [id number]

## 2023-06-17 NOTE — H&P
[facility]    History and Physical - Hospitalist Service       Date of Admission:  6/17/2023    Assessment & Plan      Irene León is a 78 year old female admitted on 6/17/2023.  She has a past medical history significant for advanced COPD on 2 to 4 L at home, chronic pain, hypertension, nonischemic cardiomyopathy ejection fraction around 40%, breast cancer status post radiation who presented to the hospital after an episode of confusion.      #Episode of confusion  #Episode of visual hallucination  -Etiology remains unclear, possible undiagnosed infection including UTI.  Other possible etiologies include small CVA versus TIA.    Plan:  [] Urinalysis  [] Bladder scan for any retention   [] Follow-up with blood cultures  [] Broad-spectrum antibiotic with vancomycin and Zosyn for tonight.  [] If recurrences of her confusion, will proceed MRA MRI brain.      #Left foot pain  -Patient stated pain on the left foot for the last 3 to 5 days.  -No reported trauma.  -Physical exam shows edema and purple discoloration of the left foot along with significant pain on palpation.    Plan:  [] MRI of left foot      #COPD  -At home on 2 to 4 L.  -Currently around baseline.  -Chest x-ray showed scarring.  No clear pneumonia.  -No signs of COPD exacerbation on physical examination.      Plan:  [] DuoNebs as needed  [] Continue home inhaler  [] Continue to monitor respiratory status  [] CT chest without contrast      #Nonischemic cardiomyopathy  #Mild fluid overload  -At home on Lasix 20 mg daily  -Peripheral edema in bilateral lower extremities.  -Received IV fluid in the ED.    Plan:  [] Start Lasix IV 20 mg daily  [] Continue home metoprolol succinate 75 mg nightly      #Acute kidney injury on CKD  -Baseline creatinine around 1.2  -Creatinine on presentation 1.9  -Etiology unclear.    Plan:  [] Urinalysis as above  [] CT abdomen and pelvis without contrast                       Diet: Combination Diet  Low Saturated Fat Na <2400mg Diet, No Caffeine DietCardiac diet  DVT Prophylaxis: DOAC  Hayes Catheter: Not present  Lines: None     Cardiac Monitoring: None  Code Status: Full CodeFull code    Clinically Significant Risk Factors Present on Admission           # Hypercalcemia: corrected calcium is >10.1, will monitor as appropriate    # Hypoalbuminemia: Lowest albumin = 3.2 g/dL at 6/17/2023 12:17 PM, will monitor as appropriate  # Drug Induced Coagulation Defect: home medication list includes an anticoagulant medication    # Hypertension: Noted on problem list               Disposition Plan Admit to inpatient     Expected Discharge Date: 06/19/2023                  NATHANIEL ARCINIEGA MD  Hospitalist Service  [facility]  Securely message with [employer] (more info)  Text page via [facility] Paging/Directory     ______________________________________________________________________    Chief Complaint   Confusion    History is obtained from the patient and her family    History of Present Illness   Irene León is a 78 year old female admitted on 6/17/2023.  She has a past medical history significant for advanced COPD on 2 to 4 L at home, chronic pain, hypertension, nonischemic cardiomyopathy ejection fraction around 40%, breast cancer status post radiation who presented to the hospital after an episode of confusion.    Per family, patient was in her usual state of health until around 2 days ago when they noticed that she is more confused.  On 6/16 the patient  reported significant confusion and visual hallucination.  Patient reported chronic cough which has been stable.  Denies any worsening shortness of breath, reported chronic orthopnea.  Noticed right foot pain over the last 3 to 5 days.  She denies any chest pain, nausea or vomiting.  She denies any burning with urination.    Vitals on presentation: Blood pressure around 130/60, heart rate around 80, SPO2 94% on room air.  Labs significant  for creatinine 1.9, baseline around 1.2, BNP 1100, normal lactic acid, normal troponin, WBC around 26.      Past Medical History    Past Medical History:   Diagnosis Date     ANATOLIY (acute kidney injury) (H)      Allergic rhinitis      Arrhythmia      Atrial fibrillation with RVR (H)      Bacteremia      Breast cancer (H) 2017     Cardiomyopathy (H)      Centrilobular emphysema (H)      CHF (congestive heart failure) (H)      CKD (chronic kidney disease)      COPD (chronic obstructive pulmonary disease) (H)      Coronary artery disease      Depression      Dysphagia      E. coli sepsis (H)      Factor 5 Leiden mutation, heterozygous (H)      GERD (gastroesophageal reflux disease)      Heart failure with reduced ejection fraction (H) 4/17/2023     Hx of radiation therapy 2017     Hyperlipidemia      Hypertension      Hypokalemia      Hypomagnesemia      Idiopathic cardiomyopathy (H)      Left hip pain 4/30/2014     OAB (overactive bladder)      Osteoporosis      Sacral insufficiency fracture      Sinusitis      Syncope      Urge incontinence      Vocal cord dysfunction        Past Surgical History   Past Surgical History:   Procedure Laterality Date     ARTHROPLASTY REVISION HIP Left      BIOPSY BREAST Right 2017     BLADDER SURGERY      bladder interstim with removal     CARDIAC CATHETERIZATION       CATARACT EXTRACTION Left 07/18/2017     IR ABDOMINAL AORTOGRAM  4/16/2003     IR AORTIC ARCH 4 VESSEL ANGIOGRAM  4/16/2003     IR MISCELLANEOUS PROCEDURE  4/16/2003     LUMPECTOMY BREAST Left 06/2017    x2     PICC DOUBLE LUMEN PLACEMENT  4/11/2022          PICC TRIPLE LUMEN PLACEMENT  4/7/2022          ZZC OPEN TX FEMORAL FRACTURE DISTAL MED/LAT CONDYLE Left 10/28/2015    Procedure: OPEN REDUCTION INTERNAL FIXATION LEFT  PROXIMAL FEMUR PERIPROSTHETIC FRACTURE;  Surgeon: Yovanny Albarran MD;  Location: [facility];  Service: Orthopedics       Prior to Admission Medications   Prior to Admission Medications    Prescriptions Last Dose Informant Patient Reported? Taking?   BREZTRI AEROSPHERE 160-9-4.8 MCG/ACT AERO inhaler   Yes No   Sig: Inhale 2 puffs into the lungs 2 times daily   CALCIUM-MAGNESIUM-ZINC PO   Yes No   Sig: Take 1 tablet by mouth daily   Fluticasone-Umeclidin-Vilanterol (TRELEGY ELLIPTA) 200-62.5-25 MCG/INH oral inhaler   Yes No   Sig: Inhale 1 puff into the lungs At Bedtime   Patient not taking: Reported on 4/18/2023   HYDROmorphone, STANDARD CONC, (DILAUDID) 1 MG/ML oral solution   No No   Sig: Take 1 mL (1 mg) by mouth 3 times daily   acetaminophen (TYLENOL) 500 MG tablet   Yes No   Sig: Take 1,000 mg by mouth every 8 hours as needed for mild pain or fever    acetylcysteine (MUCOMYST) 10 % nebulizer solution   Yes No   Sig: Inhale 4 mLs into the lungs every 4 hours   Patient not taking: Reported on 4/18/2023   albuterol (PROAIR HFA/PROVENTIL HFA/VENTOLIN HFA) 108 (90 Base) MCG/ACT inhaler   No No   Sig: Inhale 2 puffs into the lungs every 6 hours   Patient taking differently: Inhale 2 puffs into the lungs every 6 hours as needed   albuterol (PROAIR HFA/PROVENTIL HFA/VENTOLIN HFA) 108 (90 Base) MCG/ACT inhaler   Yes No   Sig: Inhale 2 puffs into the lungs   albuterol (PROVENTIL) (2.5 MG/3ML) 0.083% neb solution   No No   Sig: Take 1 vial (2.5 mg) by nebulization every 2 hours as needed for shortness of breath / dyspnea   Patient not taking: Reported on 4/18/2023   azelastine (ASTELIN) 0.1 % nasal spray   Yes No   Sig: Spray 1 spray in nostril   azithromycin (ZITHROMAX) 250 MG tablet   Yes No   cetirizine (ZYRTEC) 10 MG tablet   Yes No   Sig: Take 10 mg by mouth daily as needed for allergies   Patient not taking: Reported on 4/18/2023   chlorhexidine (PERIDEX) 0.12 % solution   Yes No   Sig: [CHLORHEXIDINE (PERIDEX) 0.12 % SOLUTION] Apply 15 mL to the mouth or throat at bedtime as needed.    famotidine (PEPCID) 20 MG tablet   No No   Sig: Take 1 tablet (20 mg) by mouth At Bedtime   Patient not taking:  Reported on 4/18/2023   fluticasone (FLONASE) 50 MCG/ACT nasal spray   No No   Sig: Spray 1 spray into both nostrils daily   Patient not taking: Reported on 4/18/2023   furosemide (LASIX) 20 MG tablet   No No   Sig: Take 1 tablet (20 mg) by mouth daily   gabapentin (NEURONTIN) 300 MG capsule   No No   Sig: Take 1 capsule (300 mg) by mouth 3 times daily   ipratropium (ATROVENT HFA) 17 MCG/ACT inhaler   No No   Sig: [ATROVENT HFA 17 MCG/ACTUATION INHALER] USE 2 INHALATIONS EVERY 6 HOURS   Patient taking differently: Inhale 2 puffs into the lungs every 6 hours as needed [ATROVENT HFA 17 MCG/ACTUATION INHALER] USE 2 INHALATIONS EVERY 6 HOURS   ipratropium - albuterol 0.5 mg/2.5 mg/3 mL (DUONEB) 0.5-2.5 (3) MG/3ML neb solution   No No   Sig: Take 1 vial (3 mLs) by nebulization 4 times daily   Patient not taking: Reported on 4/18/2023   ipratropium - albuterol 0.5 mg/2.5 mg/3 mL (DUONEB) 0.5-2.5 (3) MG/3ML neb solution   Yes No   Sig: Inhale 3 mLs into the lungs   Patient not taking: Reported on 4/18/2023   melatonin 5 MG tablet   No No   Sig: Take 1 tablet (5 mg) by mouth At Bedtime   Patient taking differently: Take 10 mg by mouth At Bedtime   metoprolol succinate ER (TOPROL XL) 50 MG 24 hr tablet   No No   Sig: Take 1.5 tablets (75 mg) by mouth At Bedtime   mirabegron (MYRBETRIQ) 50 MG 24 hr tablet   No No   Sig: Take 1 tablet (50 mg) by mouth daily   multivitamin w/minerals (THERA-VIT-M) tablet   Yes No   Sig: Take 1 tablet by mouth daily   potassium chloride ER (K-TAB/KLOR-CON) 10 MEQ CR tablet   Yes No   Sig: Take 1 tablet by mouth daily   predniSONE (DELTASONE) 20 MG tablet   Yes No   rivaroxaban ANTICOAGULANT (XARELTO) 15 MG TABS tablet   Yes No   Sig: Take 1 tablet (15 mg) by mouth At Bedtime   simvastatin (ZOCOR) 40 MG tablet   No No   Sig: Take 1 tablet (40 mg) by mouth At Bedtime   solifenacin (VESICARE) 5 MG tablet   No No   Sig: Take 1 tablet (5 mg) by mouth daily   tamoxifen (NOLVADEX) 20 MG tablet   No No    Sig: TAKE 1 TABLET DAILY   vitamin D3 (CHOLECALCIFEROL) 50 mcg (2000 units) tablet   Yes No   Sig: Take 1 tablet by mouth daily      Facility-Administered Medications: None         Physical Exam   Vital Signs: Temp: 100.1  F (37.8  C) Temp src: Temporal BP: 131/63 Pulse: 75   Resp: 25 SpO2: 93 % O2 Device: Nasal cannula Oxygen Delivery: 4 LPM  Weight: 105 lbs 0 oz    Physical Exam  Constitutional:       General: She is not in acute distress.     Appearance: She is not toxic-appearing.   Cardiovascular:      Rate and Rhythm: Rhythm irregular.   Pulmonary:      Effort: Pulmonary effort is normal. No respiratory distress.      Breath sounds: No wheezing.   Abdominal:      General: Abdomen is flat. There is no distension.      Palpations: Abdomen is soft.      Tenderness: There is no abdominal tenderness. There is no guarding.   Musculoskeletal:      Right lower leg: Edema present.      Left lower leg: Edema present.      Comments: Tenderness to palpation of left foot.   Skin:     General: Skin is warm and dry.   Neurological:      Mental Status: She is alert and oriented to person, place, and time.   Psychiatric:         Mood and Affect: Mood normal.         Medical Decision Making       80 MINUTES SPENT BY ME on the date of service doing chart review, history, exam, documentation & further activities per the note.      Data   Imaging results reviewed over the past 24 hrs:   Recent Results (from the past 24 hour(s))   Head CT w/o contrast    Narrative    EXAM: CT HEAD W/O CONTRAST  LOCATION: [facility]  DATE: 6/17/2023    INDICATION: ams  COMPARISON: 03/23/2021  TECHNIQUE: Routine CT Head without IV contrast. Multiplanar reformats. Dose reduction techniques were used.    FINDINGS:  INTRACRANIAL CONTENTS: No intracranial hemorrhage, extraaxial collection, or mass effect.  No CT evidence of acute infarct. Moderate presumed chronic small vessel ischemic changes. Similar chronic cortical infarct  of the inferior left temporal occipital   lobe. Intracranial atherosclerosis is present. Mild to moderate generalized volume loss. No hydrocephalus.     VISUALIZED ORBITS/SINUSES/MASTOIDS: No intraorbital abnormality. No paranasal sinus mucosal disease. No middle ear or mastoid effusion.    BONES/SOFT TISSUES: No acute abnormality.      Impression    IMPRESSION:  1.  No CT evidence for acute intracranial process.  2.  Brain atrophy and presumed chronic microvascular ischemic changes as above.  3.  Chronic left temporal occipital infarct, similar prior.   Chest XR,  PA & LAT    Narrative    EXAM: XR CHEST 2 VIEWS  LOCATION: [facility]  DATE: 6/17/2023    INDICATION: Hypoxia.  COMPARISON: 04/10/2022      Impression    IMPRESSION: Mild bandlike opacities in the left upper lung are likely due to scarring from the more extensive infiltrates and consolidation in this location on 04/10/2022. Additional patchy interstitial opacities throughout both lungs are likely due to   scarring. No definite new infiltrates. Normal heart size and pulmonary vascularity. Old healed bilateral rib fractures. No significant pleural effusions.     Recent Labs   Lab 06/17/23  1217   WBC 26.4*   HGB 13.7   MCV 89      INR 1.28*      POTASSIUM 3.9   CHLORIDE 94*   CO2 29   BUN 21   CR 1.94*   ANIONGAP 13   MESSI 9.8   GLC 93   ALBUMIN 3.2*   PROTTOTAL 7.7   BILITOTAL 0.9   ALKPHOS 71   ALT 10   AST 23   LIPASE 32

## 2023-06-17 NOTE — ED NOTES
Bed: [facility]  Expected date:   Expected time:   Means of arrival:   Comments:  Ems

## 2023-06-17 NOTE — ED TRIAGE NOTES
Pt BIB [facility] for AMS. Per family the pt has been acting more confused recently and today was worse. Pt was agitated but cooperative, she is alert and answering questions appropriately but confused on why she is in the hospital. Per EMS pt is normally on 4 lpm oxygen at home.     Triage Assessment     Row Name 06/17/23 1203       Triage Assessment (Adult)    Airway WDL WDL       Respiratory WDL    Respiratory WDL X;rhythm/pattern;cough    Rhythm/Pattern, Respiratory shortness of breath    Cough Frequency infrequent    Cough Type congested;good       Skin Circulation/Temperature WDL    Skin Circulation/Temperature WDL X       Cardiac WDL    Cardiac WDL WDL       Peripheral/Neurovascular WDL    Peripheral Neurovascular WDL X;all       LLE Neurovascular Assessment    Temperature LLE cool    Color LLE dusky;blotchy       RLE Neurovascular Assessment    Temperature RLE cool    Color RLE milana       Cognitive/Neuro/Behavioral WDL    Cognitive/Neuro/Behavioral WDL X;mood/behavior    Level of Consciousness confused    Orientation situation;place    Mood/Behavior agitated;cooperative       Pupils (CN II)    Pupil PERRLA yes    Pupil Size Left 2 mm    Pupil Size Right 2 mm       Malachi Coma Scale    Best Eye Response 4-->(E4) spontaneous    Best Motor Response 6-->(M6) obeys commands    Best Verbal Response 4-->(V4) confused    [place] Coma Scale Score 14

## 2023-06-18 NOTE — PROGRESS NOTES
Dr Ziegler notified about patient wanting tramadol which she takes daily at home. Tylenol given. Waiting for Tramadol order. MRI tech stated that patient was not able to tolerated MRI yesterday when she was laying flat. She became very short of breath and panicked therefore unable to lay still. In addition she was incontinent of urine and became upset which increased her anxiety. We have an order for win to be placed for MRI and ativan ordered for MRI. We will do a trial of lying flat on the floor to see if patient will be able to tolerate MRI. Patient and Daughter informed of the plan.

## 2023-06-18 NOTE — PHARMACY-VANCOMYCIN DOSING SERVICE
"Pharmacy Vancomycin Initial Note  Date of Service 2023  Patient's  1945  78 year old, female    Indication: Urinary Tract Infection    Current estimated CrCl = Estimated Creatinine Clearance: 18 mL/min (A) (based on SCr of 1.94 mg/dL (H)).    Creatinine for last 3 days  2023: 12:17 PM Creatinine 1.94 mg/dL    Recent Vancomycin Level(s) for last 3 days  No results found for requested labs within last 3 days.      Vancomycin IV Administrations (past 72 hours)      No vancomycin orders with administrations in past 72 hours.                Nephrotoxins and other renal medications (From now, onward)    Start     Dose/Rate Route Frequency Ordered Stop    23 1630  piperacillin-tazobactam (ZOSYN) 3.375 g vial to attach to  mL bag        Note to Pharmacy: For [facility], [facility] and [facility]: For Zosyn-naive patients, use the \"Zosyn initial dose + extended infusion\" order panel.    3.375 g  over 30 Minutes Intravenous ONCE 23 1535      23 1600  furosemide (LASIX) injection 20 mg         20 mg  over 1-3 Minutes Intravenous DAILY 23 1539            Contrast Orders - past 72 hours (72h ago, onward)    Start     Dose/Rate Route Frequency Stop    23 1800  gadobutrol (GADAVIST) injection 5 mL         5 mL Intravenous ONCE                  Plan:  1. Give vancomycin 1000 mg IV once. Vancomycin intermittent dosing.   2. Vancomycin monitoring method: Trough (Method 2 = manual dose calculation)  3. Vancomycin therapeutic monitoring goal: 15-20 mg/L  4. Pharmacy will check vancomycin levels as appropriate in 1-3 Days.    5. Serum creatinine levels will be ordered daily for the first week of therapy and at least twice weekly for subsequent weeks.      AROLDO COMBS [facility]    "

## 2023-06-18 NOTE — PLAN OF CARE
Problem: Plan of Care - These are the overarching goals to be used throughout the patient stay.    Goal: Absence of Hospital-Acquired Illness or Injury  Intervention: Identify and Manage Fall Risk  Recent Flowsheet Documentation  Taken 6/18/2023 0800 by Yaquelin Tsai RN  Safety Promotion/Fall Prevention: safety round/check completed     Problem: Plan of Care - These are the overarching goals to be used throughout the patient stay.    Goal: Optimal Comfort and Wellbeing  Intervention: Monitor Pain and Promote Comfort  Recent Flowsheet Documentation  Taken 6/18/2023 0804 by Yaquelin Tsai RN  Pain Management Interventions: medication (see MAR)     Problem: Risk for Delirium  Goal: Improved Behavioral Control  6/18/2023 1334 by Yaquelin Tsai RN  Outcome: Progressing  6/18/2023 1333 by Yaquelin Tsai RN  Outcome: Progressing  Alert and oriented to person and time.   Answers questions apropriately.   Intervention: Minimize Safety Risk  Recent Flowsheet Documentation  Taken 6/18/2023 0800 by Yaquelin Tsai RN  Enhanced Safety Measures: room near unit station   Using call light.   Assist of 1 with walker and gait belt.     Problem: COPD (Chronic Obstructive Pulmonary Disease) Comorbidity  Goal: Maintenance of COPD Symptom Control  Outcome: Progressing   Goal Outcome Evaluation:       Patient is on 4 L O2 per Nasal Cannula. Lung sounds clear in upper lobes, diminished in bases.

## 2023-06-18 NOTE — PHARMACY-ADMISSION MEDICATION HISTORY
Pharmacist Admission Medication History    Admission medication history is complete. The information provided in this note is only as accurate as the sources available at the time of the update.    Medication reconciliation/reorder completed by provider prior to medication history? Yes    Information Source(s): Patient via in-person    Pertinent Information: None    Changes made to PTA medication list:    Added: tramadol     Deleted: Breztri, Flonase, Azelastine, Azithromycin, Duoneb    Changed: Gabapentin    Medication Affordability:  Not including over the counter (OTC) medications, was there a time in the past 3 months when you did not take your medications as prescribed because of cost?: No    Allergies reviewed with patient and updates made in EHR: yes    Medication History Completed By: AROLDO COMBS [facility] 6/17/2023 10:17 PM    Prior to Admission medications    Medication Sig Last Dose Taking? Auth Provider Long Term End Date   acetaminophen (TYLENOL) 500 MG tablet Take 1,000 mg by mouth every 8 hours as needed for mild pain or fever  Past Week at PRN Yes Provider, Historical     albuterol (PROAIR HFA/PROVENTIL HFA/VENTOLIN HFA) 108 (90 Base) MCG/ACT inhaler Inhale 2 puffs into the lungs every 6 hours  Patient taking differently: Inhale 2 puffs into the lungs every 6 hours as needed Past Week at PRN Yes Pankaj Montanez MD Yes    CALCIUM-MAGNESIUM-ZINC PO Take 1 tablet by mouth daily 6/16/2023 at AM Yes Unknown, Entered By History     cetirizine (ZYRTEC) 10 MG tablet Take 10 mg by mouth daily as needed for allergies Past Week at PRN Yes Reported, Patient     chlorhexidine (PERIDEX) 0.12 % solution [CHLORHEXIDINE (PERIDEX) 0.12 % SOLUTION] Apply 15 mL to the mouth or throat at bedtime as needed.  Past Week at PRN Yes Provider, Historical     Fluticasone-Umeclidin-Vilanterol (TRELEGY ELLIPTA) 200-62.5-25 MCG/INH oral inhaler Inhale 1 puff into the lungs At Bedtime 6/16/2023 at HS Yes Unknown, Entered By History      furosemide (LASIX) 20 MG tablet Take 1 tablet (20 mg) by mouth daily 6/16/2023 at AM Yes Pankaj Montanez MD Yes    gabapentin (NEURONTIN) 300 MG capsule Take 1 capsule (300 mg) by mouth 3 times daily  Patient taking differently: Take 600 mg by mouth At Bedtime 6/16/2023 at HS Yes Tobi Miguel MD Yes    HYDROmorphone, STANDARD CONC, (DILAUDID) 1 MG/ML oral solution Take 1 mL (1 mg) by mouth 3 times daily  Patient taking differently: Take 1 mg by mouth 2 times daily 6/16/2023 at HS Yes Tobi Miguel MD     ipratropium (ATROVENT HFA) 17 MCG/ACT inhaler [ATROVENT HFA 17 MCG/ACTUATION INHALER] USE 2 INHALATIONS EVERY 6 HOURS  Patient taking differently: Inhale 2 puffs into the lungs every 6 hours as needed [ATROVENT HFA 17 MCG/ACTUATION INHALER] USE 2 INHALATIONS EVERY 6 HOURS Past Week at PRN Yes Aakash Flores, CNP Yes    melatonin 5 MG tablet Take 1 tablet (5 mg) by mouth At Bedtime  Patient taking differently: Take 10 mg by mouth At Bedtime 6/16/2023 at HS Yes Irene Lombardo MD     metoprolol succinate ER (TOPROL XL) 50 MG 24 hr tablet Take 1.5 tablets (75 mg) by mouth At Bedtime 6/16/2023 at HS Yes Pankaj Montanez MD Yes    mirabegron (MYRBETRIQ) 50 MG 24 hr tablet Take 1 tablet (50 mg) by mouth daily 6/16/2023 at AM Yes Pankaj Montanez MD Yes    multivitamin w/minerals (THERA-VIT-M) tablet Take 1 tablet by mouth daily 6/16/2023 at AM Yes Reported, Patient     potassium chloride ER (K-TAB/KLOR-CON) 10 MEQ CR tablet Take 1 tablet by mouth daily 6/16/2023 at AM Yes Reported, Patient     predniSONE (DELTASONE) 20 MG tablet  6/16/2023 at AM Yes Reported, Patient     rivaroxaban ANTICOAGULANT (XARELTO) 15 MG TABS tablet Take 1 tablet (15 mg) by mouth At Bedtime 6/16/2023 at HS Yes Unknown, Entered By History No    simvastatin (ZOCOR) 40 MG tablet Take 1 tablet (40 mg) by mouth At Bedtime 6/16/2023 at HS Yes Pankaj Montanez MD Yes    solifenacin (VESICARE) 5 MG tablet Take 1 tablet (5 mg) by mouth  daily 6/16/2023 at AM Yes Pankaj Montanez MD Yes    tamoxifen (NOLVADEX) 20 MG tablet TAKE 1 TABLET DAILY 6/16/2023 at HS Yes Devon Suarez MD Yes    traMADol (ULTRAM) 50 MG tablet Take 50 mg by mouth 2 times daily 6/16/2023 at HS Yes Unknown, Entered By History     vitamin D3 (CHOLECALCIFEROL) 50 mcg (2000 units) tablet Take 1 tablet by mouth daily 6/16/2023 at AM Yes Unknown, Entered By History     acetylcysteine (MUCOMYST) 10 % nebulizer solution Inhale 4 mLs into the lungs every 4 hours  Patient not taking: Reported on 6/17/2023 Not Taking  Reported, Patient     albuterol (PROVENTIL) (2.5 MG/3ML) 0.083% neb solution Take 1 vial (2.5 mg) by nebulization every 2 hours as needed for shortness of breath / dyspnea  Patient not taking: Reported on 6/17/2023 Not Taking  Irene Lombardo MD Yes    BREZTRI AEROSPHERE 160-9-4.8 MCG/ACT AERO inhaler Inhale 2 puffs into the lungs 2 times daily   Reported, Patient     famotidine (PEPCID) 20 MG tablet Take 1 tablet (20 mg) by mouth At Bedtime 6/15/2023 at HS  Pankaj Montanez MD     fluticasone (FLONASE) 50 MCG/ACT nasal spray Spray 1 spray into both nostrils daily  Patient not taking: Reported on 6/17/2023 Not Taking  Irene Lombardo MD     ipratropium - albuterol 0.5 mg/2.5 mg/3 mL (DUONEB) 0.5-2.5 (3) MG/3ML neb solution Inhale 3 mLs into the lungs  Patient not taking: Reported on 4/18/2023   Reported, Patient Yes    ipratropium - albuterol 0.5 mg/2.5 mg/3 mL (DUONEB) 0.5-2.5 (3) MG/3ML neb solution Take 1 vial (3 mLs) by nebulization 4 times daily  Patient not taking: Reported on 6/17/2023 Not Taking  Pankaj Montanez MD Yes

## 2023-06-18 NOTE — PROGRESS NOTES
[facility]    Medicine Progress Note - Hospitalist Service    Date of Admission:  6/17/2023    Assessment & Plan   Irene León is a 78 year old female admitted on 6/17/2023.  She has a past medical history significant for advanced COPD on 2 to 4 L at home, chronic pain, hypertension, nonischemic cardiomyopathy ejection fraction around 40%, breast cancer status post radiation who presented to the hospital after an episode of confusion.  On presentation, she had mild fluid overload with elevated BNP.  Urinalysis negative for any infection.  CT chest abdomen pelvis showed new patchy infiltrate bibasilarly possibly pneumonia.  Increased clusters of nodules in the right upper lobe possibly infectious versus inflammatory.      #Episode of confusion  #Episode of visual hallucination  -Patient usually is alert and oriented x3.  -Per family, she had significant confusion, visual hallucination on 6/17.  -Patient mental status has slightly improved, however, is not at baseline.   -Etiology remains unclear, possibly metabolic encephalopathy due to pneumonia, however, what goes against it is negative procalcitonin, WBC did not significant improved after starting antibiotic.   -Possible etiologies include small CVA versus TIA or metastatic cancer given her history of breast cancer.  -Blood cultures remain negative to date.    Plan:  [] Proceed with brain MRI/MRA.  [] Hayes catheter placement before attempting brain MRI as the patient has a history of urinary incontinence.  [] Follow-up with blood cultures  [] Continue broad-spectrum antibiotic with vancomycin and Zosyn for tonight.  [] PT/OT      #Left foot pain  -Patient stated pain on the left foot for the last 3 to 5 days.  -No reported trauma.  -Physical exam shows edema and purple discoloration of the left foot along with significant pain on palpation.  -Attempted MRI on 6/17, aborted due to an incidence of urinary continence.    Plan:  [] MRI  of left foot today.  [] Ultrasound of left lower extremity.      #COPD  #Incidental finding  -at home per patient she is between 2 to 4 L.  -No signs of COPD exacerbation.  -Respiratory status appears close to baseline  -CT chest abdomen pelvis showed new patchy infiltrate bibasilarly possibly pneumonia.  Increased clusters of nodules in the right upper lobe possibly infectious versus inflammatory.        Plan:  [] DuoNebs as needed  [] Continue home inhaler (or equivalent)  [] CT chest in 3 months as an outpatient.      #Nonischemic cardiomyopathy  #Mild fluid overload  -At home on Lasix 20 mg daily  -On presentation appears slightly fluid overloaded with peripheral edema.  -Received IV fluid in the ED.  -Started on Lasix 20 mg IV daily on 6/17.    Plan:  [] Continue Lasix IV 20 mg daily--> will most likely transition to oral Lasix home dose on 6/19.  [] Continue home metoprolol succinate 75 mg nightly    #Acute kidney injury on CKD  -Baseline creatinine around 1.2  -Creatinine on presentation 1.9  -Creatinine improved with diuresing to around 1.6.    Plan:  [] Continue to monitor.    #Hypokalemia    Plan:  [] Replete per protocol    #QTc prolongation  -QTc prolonged around 520.    Plan:  [] Holding home tramadol.  [] Hold home tamoxifen  [] Replete electrolytes  [] Repeat EKG.      #History of urinary incontinence  -At home on mirabegron 50 mg daily and solifenacin.    Plan:  [] Continue home mirabergron.  [] Hold solifenacin given QTc prolongation                   Diet: Combination Diet Low Saturated Fat Na <2400mg Diet, No Caffeine Diet    DVT Prophylaxis: Pneumatic Compression Devices  Hayes Catheter: Not present  Lines: None     Cardiac Monitoring: None  Code Status: Full Code      Clinically Significant Risk Factors Present on Admission          # Hypocalcemia: Lowest Ca = 8.3 mg/dL in last 2 days, will monitor and replace as appropriate  # Hypercalcemia: corrected calcium is >10.1, will monitor as  appropriate    # Hypoalbuminemia: Lowest albumin = 3.2 g/dL at 6/17/2023 12:17 PM, will monitor as appropriate  # Drug Induced Coagulation Defect: home medication list includes an anticoagulant medication    # Hypertension: Noted on problem list               Disposition Plan     Expected Discharge Date: 06/19/2023                  NATHANIEL ARCINIEGA MD  Hospitalist Service  [facility]  Securely message with Bookatable (Livebookings) (more info)  Text page via OpenDrive Paging/Directory   ______________________________________________________________________    Interval History   No significant events.  Patient continues to have some intermittent cough.  Denies any symptoms of UTI.  She is frustrated with her inability to remember the year dates.  She denies any chest pain or significant shortness of breath.     Physical Exam   Vital Signs: Temp: 97.2  F (36.2  C) Temp src: Oral BP: 126/61 Pulse: 67   Resp: 17 SpO2: 99 % O2 Device: Nasal cannula Oxygen Delivery: 4 LPM  Weight: 105 lbs 0 oz    Physical Exam  Constitutional:       General: She is not in acute distress.     Appearance: She is not toxic-appearing.      Comments: Low weight.   Cardiovascular:      Rate and Rhythm: Normal rate.   Pulmonary:      Effort: Pulmonary effort is normal.   Musculoskeletal:      Comments: Bilateral lower extremity edema significantly improved since yesterday.  Purplish discoloration on the dorsal aspect of the left foot with significant tenderness to palpation.   Skin:     General: Skin is warm and dry.   Neurological:      Mental Status: She is alert.      Comments: Oriented to self.  Continues to have intermittent confusion.  Not at baseline per family.   Psychiatric:         Mood and Affect: Mood normal.         Medical Decision Making       60 MINUTES SPENT BY ME on the date of service doing chart review, history, exam, documentation & further activities per the note.      Data   Imaging results reviewed over the past 24 hrs:    Recent Results (from the past 24 hour(s))   Head CT w/o contrast    Narrative    EXAM: CT HEAD W/O CONTRAST  LOCATION: [facility]  DATE: 6/17/2023    INDICATION: ams  COMPARISON: 03/23/2021  TECHNIQUE: Routine CT Head without IV contrast. Multiplanar reformats. Dose reduction techniques were used.    FINDINGS:  INTRACRANIAL CONTENTS: No intracranial hemorrhage, extraaxial collection, or mass effect.  No CT evidence of acute infarct. Moderate presumed chronic small vessel ischemic changes. Similar chronic cortical infarct of the inferior left temporal occipital   lobe. Intracranial atherosclerosis is present. Mild to moderate generalized volume loss. No hydrocephalus.     VISUALIZED ORBITS/SINUSES/MASTOIDS: No intraorbital abnormality. No paranasal sinus mucosal disease. No middle ear or mastoid effusion.    BONES/SOFT TISSUES: No acute abnormality.      Impression    IMPRESSION:  1.  No CT evidence for acute intracranial process.  2.  Brain atrophy and presumed chronic microvascular ischemic changes as above.  3.  Chronic left temporal occipital infarct, similar prior.   Chest XR,  PA & LAT    Narrative    EXAM: XR CHEST 2 VIEWS  LOCATION: [facility]  DATE: 6/17/2023    INDICATION: Hypoxia.  COMPARISON: 04/10/2022      Impression    IMPRESSION: Mild bandlike opacities in the left upper lung are likely due to scarring from the more extensive infiltrates and consolidation in this location on 04/10/2022. Additional patchy interstitial opacities throughout both lungs are likely due to   scarring. No definite new infiltrates. Normal heart size and pulmonary vascularity. Old healed bilateral rib fractures. No significant pleural effusions.   CT Chest w/o Contrast    Narrative    EXAM: CT CHEST W/O CONTRAST, CT ABDOMEN PELVIS W/O CONTRAST  LOCATION: [facility]  DATE: 6/17/2023    INDICATION: Shortness of breath, confusion and leukocytosis.  History of COPD. Increased opacities left lung on chest radiograph. Sepsis and acute kidney injury.  COMPARISON: 2 view chest 06/17/2023, CT chest 04/10/2023, CT pelvis 10/11/2022, CT chest abdomen pelvis 04/08/2022  TECHNIQUE: CT scan of the chest, abdomen, and pelvis was performed without IV contrast. Multiplanar reformats were obtained. Dose reduction techniques were used.   CONTRAST: None.    FINDINGS:   LUNGS AND PLEURA: Advanced emphysema redemonstrated, scattered subpleural scarring and mild bronchial wall thickening redemonstrated. 12 x 7 mm nodular opacity right upper lobe image 95 series 4 previously measured 10 x 6 mm. Adjacent cluster of new   nodules, largest measuring measure 8 mm image 114.    New patchy consolidative airspace opacities within the lung bases, greater on the left, compatible with pneumonia. Band of scarring bronchiectasis left upper lobe along the major fissure unchanged. Central airways are patent.    MEDIASTINUM/AXILLAE: No mass/adenopathy nor pericardial effusion. Mild cardiomegaly, overall decreased    CORONARY ARTERY CALCIFICATION: Moderate.    HEPATOBILIARY: Normal.    PANCREAS: Normal.    SPLEEN: Normal.    ADRENAL GLANDS: Normal.    KIDNEYS/BLADDER: Mild bilateral pelvocaliectasis is new compared to prior studies. 2 mm calcification right upper pole is new. No ureteral calculus. The bladder is negative.    BOWEL: Diverticulosis of the colon. No acute inflammatory change. No obstruction. Normal appendix.    LYMPH NODES: No lymphadenopathy.    VASCULATURE: Aortic ectasia with moderate aortoiliac atherosclerosis. No aneurysm.    PELVIC ORGANS: Normal.    MUSCULOSKELETAL: Generalized demineralization and rightward lumbar scoliosis redemonstrated. Stable chronic T12 compression deformity. No suspicious osseous lesions.      Impression    IMPRESSION:  1.  New patchy consolidative airspace opacities within the lung bases, greater on the left, compatible with pneumonia.  2.  Increase  in clustered nodular opacities right upper lobe may be infectious/inflammatory. Short interval follow-up CT in 3 months could be performed to reevaluate.  3.  Mild bilateral pelvocaliectasis new compared to prior studies. New 2 mm nonobstructing calculus right upper pole. No ureteral calculus. Consider short interval follow-up renal ultrasound with pre-/post void bladder.     CT Abdomen Pelvis w/o Contrast    Narrative    EXAM: CT CHEST W/O CONTRAST, CT ABDOMEN PELVIS W/O CONTRAST  LOCATION: [facility]  DATE: 6/17/2023    INDICATION: Shortness of breath, confusion and leukocytosis. History of COPD. Increased opacities left lung on chest radiograph. Sepsis and acute kidney injury.  COMPARISON: 2 view chest 06/17/2023, CT chest 04/10/2023, CT pelvis 10/11/2022, CT chest abdomen pelvis 04/08/2022  TECHNIQUE: CT scan of the chest, abdomen, and pelvis was performed without IV contrast. Multiplanar reformats were obtained. Dose reduction techniques were used.   CONTRAST: None.    FINDINGS:   LUNGS AND PLEURA: Advanced emphysema redemonstrated, scattered subpleural scarring and mild bronchial wall thickening redemonstrated. 12 x 7 mm nodular opacity right upper lobe image 95 series 4 previously measured 10 x 6 mm. Adjacent cluster of new   nodules, largest measuring measure 8 mm image 114.    New patchy consolidative airspace opacities within the lung bases, greater on the left, compatible with pneumonia. Band of scarring bronchiectasis left upper lobe along the major fissure unchanged. Central airways are patent.    MEDIASTINUM/AXILLAE: No mass/adenopathy nor pericardial effusion. Mild cardiomegaly, overall decreased    CORONARY ARTERY CALCIFICATION: Moderate.    HEPATOBILIARY: Normal.    PANCREAS: Normal.    SPLEEN: Normal.    ADRENAL GLANDS: Normal.    KIDNEYS/BLADDER: Mild bilateral pelvocaliectasis is new compared to prior studies. 2 mm calcification right upper pole is new. No ureteral  calculus. The bladder is negative.    BOWEL: Diverticulosis of the colon. No acute inflammatory change. No obstruction. Normal appendix.    LYMPH NODES: No lymphadenopathy.    VASCULATURE: Aortic ectasia with moderate aortoiliac atherosclerosis. No aneurysm.    PELVIC ORGANS: Normal.    MUSCULOSKELETAL: Generalized demineralization and rightward lumbar scoliosis redemonstrated. Stable chronic T12 compression deformity. No suspicious osseous lesions.      Impression    IMPRESSION:  1.  New patchy consolidative airspace opacities within the lung bases, greater on the left, compatible with pneumonia.  2.  Increase in clustered nodular opacities right upper lobe may be infectious/inflammatory. Short interval follow-up CT in 3 months could be performed to reevaluate.  3.  Mild bilateral pelvocaliectasis new compared to prior studies. New 2 mm nonobstructing calculus right upper pole. No ureteral calculus. Consider short interval follow-up renal ultrasound with pre-/post void bladder.       Recent Labs   Lab 06/18/23  1018 06/18/23  0711 06/17/23  1217   WBC 25.1*  --  26.4*   HGB 13.6  --  13.7   MCV 87  --  89     --  210   INR  --   --  1.28*   NA  --  141 136   POTASSIUM  --  3.4*  3.4* 3.9   CHLORIDE  --  101 94*   CO2  --  27 29   BUN  --  26 21   CR  --  1.61*  1.61* 1.94*   ANIONGAP  --  13 13   MESSI  --  8.3* 9.8   GLC  --  118 93   ALBUMIN  --   --  3.2*   PROTTOTAL  --   --  7.7   BILITOTAL  --   --  0.9   ALKPHOS  --   --  71   ALT  --   --  10   AST  --   --  23   LIPASE  --   --  32

## 2023-06-18 NOTE — PLAN OF CARE
Problem: Plan of Care - These are the overarching goals to be used throughout the patient stay.    Goal: Optimal Comfort and Wellbeing  Outcome: Progressing    Patient alert and oriented x3. Vss .On O2 at 4LPM. Patient was confused and anxious.  Pure wick on. Was taken down to MRI but was not a good candidate for it. Daughter stopped by after she came back from MRI. Cooperative with cares, able to make her needs known.  Continue to monitor.

## 2023-06-18 NOTE — PHARMACY-VANCOMYCIN DOSING SERVICE
"Pharmacy Vancomycin Note  Date of Service 2023  Patient's  1945  78 year old, female    Indication: Community Acquired Pneumonia    Current estimated CrCl = Estimated Creatinine Clearance: 21.6 mL/min (A) (based on SCr of 1.61 mg/dL (H)).    Creatinine for last 3 days  2023: 12:17 PM Creatinine 1.94 mg/dL  2023:  7:11 AM Creatinine 1.61 mg/dL;  7:11 AM Creatinine 1.61 mg/dL    Recent Vancomycin Level(s) for last 3 days  No results found for requested labs within last 3 days.      Vancomycin IV Administrations (past 72 hours)                   vancomycin (VANCOCIN) 1,000 mg in NaCl 0.9% 200 mL intermittent infusion (mg) 1,000 mg Given 23                Nephrotoxins and other renal medications (From now, onward)    Start     Dose/Rate Route Frequency Ordered Stop    23 2100  vancomycin (VANCOCIN) 1,000 mg in NaCl 0.9% 200 mL intermittent infusion         1,000 mg  over 60 Minutes Intravenous EVERY 48 HOURS 23 0922      23 0930  piperacillin-tazobactam (ZOSYN) 3.375 g vial to attach to  mL bag        Note to Pharmacy: For [facility], [facility] and [facility]: For Zosyn-naive patients, use the \"Zosyn initial dose + extended infusion\" order panel.    3.375 g  over 240 Minutes Intravenous EVERY 8 HOURS 23 0922      23 1600  furosemide (LASIX) injection 20 mg         20 mg  over 1-3 Minutes Intravenous DAILY 23 1539            Contrast Orders - past 72 hours (72h ago, onward)    Start     Dose/Rate Route Frequency Stop    23 1800  gadobutrol (GADAVIST) injection 5 mL         5 mL Intravenous ONCE            [employer] Prediction of Planned Initial Vancomycin Regimen  Regimen: 1000 mg IV every 48 hours.  Exposure target: AUC24 (range)400-600 mg/L.hr   AUC24,ss: 429 mg/L.hr  Probability of AUC24 > 400: 59 %  Ctrough,ss: 12.1 mg/L  Probability of Ctrough,ss > 20: 9 %  Probability of nephrotoxicity (Lodise LARA ): 7 %          Plan:  1. Vancomycin  1000 mg IV " q48h.   2. Will need to watch renal fxn closely as may need change in dosing frequency  3. Vancomycin monitoring method: AUC  4. Vancomycin therapeutic monitoring goal: 400-600 mg*h/L  5. Pharmacy will check vancomycin levels as appropriate in 1-3 Days.    6. Serum creatinine levels will be ordered daily for the first week of therapy and at least twice weekly for subsequent weeks.      Srinath Harvey RP

## 2023-06-19 NOTE — PROGRESS NOTES
[facility]    Medicine Progress Note - Hospitalist Service    Date of Admission:  6/17/2023    Assessment & Plan   Irene León is a 78 year old female admitted on 6/17/2023.  She has a past medical history significant for advanced COPD on 2 to 4 L at home, hypertension, nonischemic cardiomyopathy ejection fraction around 40%, breast cancer status post radiation who presented to the hospital after an episode of confusion. On presentation, she had mild fluid overload with elevated BNP.  Urinalysis negative for any infection. CT chest abdomen pelvis showed new patchy infiltrate bibasilarly possibly pneumonia.  Patient was started on broad-spectrum antibiotic with vancomycin and Zosyn as the source of infection was unclear. MRA and MRI brain on 6/18   no acute abnormalities, chronic left PCA distribution cortical infarct.  At this point the etiology of her altered mental status possibly TIA, less likely metabolic encephalopathy.  Despite broad-spectrum antibiotic over the last 3 days, leukocytosis has been persistent. (Patient is on prednisone 20 mg daily, however, usually it would not increase white count to this level)      #Episode of confusion  #Episode of visual hallucination  -Per family, she had significant confusion, visual hallucination on 6/17.  -On presentation, patient appears intermittently confused, however, mental status has significantly improved over the last 24 hours.  Appears at baseline.  -MRI and MRA brain as above.  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  -Hayes catheter placed for MRI (had an episode of incontinence.)    Plan:  [] No further intervention  [] Remove Hayes catheter whenever imaging completed.      #Possible bibasilar pneumonia  #Persistent leukocytosis  #Left foot pain  #Lower back pain  -No clear symptoms of pneumonia.  -Mild bibasilar infiltrate.  -Started on vancomycin and Zosyn.   -Procalcitonin negative.  -Pain on  the left dorsal aspect of the foot for 5 days prior to hospitalization.  -Ultrasound of left foot showed subcutaneous fluid collection in the anterior left foot along the area of pain measuring 4.3 x 0.6 x 2.4 cm.  Possible seroma or hematoma.  Could not rule out infection.  Recommended MRI.  -No reported trauma.  -Tenderness to palpation, however, no warmth.  -Etiology of persistent leukocytosis is unclear (Steroid use, infectious or malignancy)  -Family is very concerned and would like answers.   -Recent CT chest showed right upper lobe nodule which has slightly increased in size over the last 2 months.     Plan:  [] Continue broad-spectrum antibiotic for now  [] Check CRP  [] MRI of the left foot (unlikely infection)  [] MRI lumbar spine (highly unlikely osteomyelitis)  [] Consider consulting infectious disease or hematology oncology if leukocytosis persistent and no source of infection.      #COPD  #Chronic Respiratory Failure with Hypoxia  -At home per patient she is between 2 to 4 L.  -No signs of COPD exacerbation.    Plan:  [] DuoNebs as needed  [] Continue home inhaler (or equivalent)  [] Discontinue steroid (check if white count improved after stopping steroid)      #Incidental finding on CT chest and abdomen  -CT chest abdomen pelvis showed increased clusters of nodules in the right upper lobe possibly infectious versus inflammatory. Redemonstrated. 12 x 7 mm nodular opacity right upper lobe image  previously measured 10 x 6 mm.   -Mild bilateral pelvocaliectasis new compared to prior studies. New 2 mm nonobstructing calculus right upper pole.  Follow-up with renal ultrasound    Plan:  [] CT chest in 3 months as an outpatient.  [] Renal ultrasound as an outpatient      #Nonischemic cardiomyopathy  #Mild fluid overload  -At home on Lasix 20 mg daily  -On presentation appears slightly fluid overloaded with peripheral edema.  -Received IV fluid in the ED.  -Started on Lasix 20 mg IV daily on 6/17.  -Appears  close to euvolemia    Plan:  [] Switch back to Lasix 20 mg p.o. daily  [] Continue home metoprolol succinate 75 mg nightly.      #Acute kidney injury on CKD (stage III)  -Baseline creatinine around 1.2  -Creatinine on presentation 1.9  -Creatinine improved with diuresing to around 1.6.    Plan:  [] Continue to monitor.    #Hypokalemia  -Potassium 3    Plan:  [] Replete per protocol    #QTc prolongation  -Initially QTc around 520.  Held tramadol atomoxetine.  Repeat EKG QTc 480  -Family requested restarting tramadol      Plan:  [] Restart home tramadol and tamoxifen.  [] Replete electrolytes.      #History of urinary incontinence  -At home on mirabegron 50 mg daily and solifenacin.    Plan:  [] Continue home mirabergron.  [] Hold solifenacin given QTc prolongation                   Diet: Combination Diet Low Saturated Fat Na <2400mg Diet, No Caffeine Diet    DVT Prophylaxis: Pneumatic Compression Devices  Hayes Catheter: PRESENT, indication: Other (Comment) (for imagaing procedures)  Lines: None     Cardiac Monitoring: None  Code Status: Full Code      Clinically Significant Risk Factors        # Hypokalemia: Lowest K = 3 mmol/L in last 2 days, will replace as needed       # Hypoalbuminemia: Lowest albumin = 3.2 g/dL at 6/17/2023 12:17 PM, will monitor as appropriate  # Coagulation Defect: INR = 1.28 (Ref range: 0.85 - 1.15) and/or PTT = N/A, will monitor for bleeding    # Hypertension: Noted on problem list                 Disposition Plan Home    Expected Discharge Date: 06/20/2023      Destination: home with family  Discharge Comments: Needs MRI of GUERA ARCINIEGA MD  Hospitalist Service  [facility]  Securely message with [employer] (more info)  Text page via [employer] Paging/Directory   ______________________________________________________________________    Interval History   No significant events.  Feeling better.  Mental status has significant proved.  Denies any chest pain or  shortness of breath.      Physical Exam   Vital Signs: Temp: 97.3  F (36.3  C) Temp src: Oral BP: 107/55 Pulse: 60   Resp: 16 SpO2: 98 % O2 Device: None (Room air) Oxygen Delivery: 3 LPM  Weight: 108 lbs .41 oz    Physical Exam  Constitutional:       General: She is not in acute distress.     Appearance: She is not toxic-appearing.      Comments: Cachectic.   Cardiovascular:      Rate and Rhythm: Normal rate.   Pulmonary:      Effort: Pulmonary effort is normal.   Musculoskeletal:      Right lower leg: No edema.      Left lower leg: No edema.   Skin:     General: Skin is warm and dry.   Neurological:      General: No focal deficit present.      Mental Status: She is alert and oriented to person, place, and time. Mental status is at baseline.   Psychiatric:         Mood and Affect: Mood normal.         Medical Decision Making       60 MINUTES SPENT BY ME on the date of service doing chart review, history, exam, documentation & further activities per the note.      Data   Imaging results reviewed over the past 24 hrs:   Recent Results (from the past 24 hour(s))   MR Brain w/o Contrast    Narrative    EXAM: MR BRAIN W/O CONTRAST, MRA BRAIN ([place]) W/O CONTRAST  LOCATION: [facility]  DATE: 6/18/2023    INDICATION: History of breast cancer, presented with significant episodes of confusion, visual hallucination.  No focal motor deficits.  Leukocytosis.  COMPARISON: 06/17/2023  TECHNIQUE:   1) Routine multiplanar multisequence head MRI without intravenous contrast.  2) 3D time-of-flight head MRA without intravenous contrast.    FINDINGS:  HEAD MRI: Evaluation for metastatic disease is limited in the absence of contrast.  INTRACRANIAL CONTENTS: No acute or subacute infarct. No mass, acute hemorrhage, or extra-axial fluid collections. Patchy nonspecific T2/FLAIR hyperintensities within the cerebral white matter and jose most consistent with mild to moderate chronic   microvascular  ischemic change. There is chronic cortical infarct of the medial left temporal and occipital lobes. Mild generalized cerebral atrophy. No hydrocephalus. Normal position of the cerebellar tonsils.     SELLA: No abnormality accounting for technique.    OSSEOUS STRUCTURES/SOFT TISSUES: Normal marrow signal. The major intracranial vascular flow voids are maintained.     ORBITS: Prior bilateral cataract surgery. Visualized portions of the orbits are otherwise unremarkable.     SINUSES/MASTOIDS: There is moderate mucosal thickening of left sphenoid sinus. No middle ear or mastoid effusion.     HEAD MRA:   ANTERIOR CIRCULATION: No stenosis/occlusion, aneurysm, or high flow vascular malformation. Standard [place] anatomy.    POSTERIOR CIRCULATION: There is gradual tapering and eventual occlusion of the distal left posterior cerebral artery. The right posterior cerebral artery is widely patent. No aneurysm, or high flow vascular malformation. Balanced vertebral arteries   supply a normal basilar artery.       Impression    IMPRESSION:  HEAD MRI:   1.  No acute intracranial process.  2.  Generalized brain atrophy and presumed microvascular ischemic changes as detailed above.  3.  Chronic left PCA distribution cortical infarct.  4.  Left sphenoid sinusitis.    HEAD MRA:   1.  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  2.  Otherwise unremarkable MRI without intravenous.   MRA Brain ([place]) wo Contrast    Narrative    EXAM: MR BRAIN W/O CONTRAST, MRA BRAIN ([place]) W/O CONTRAST  LOCATION: [facility]  DATE: 6/18/2023    INDICATION: History of breast cancer, presented with significant episodes of confusion, visual hallucination.  No focal motor deficits.  Leukocytosis.  COMPARISON: 06/17/2023  TECHNIQUE:   1) Routine multiplanar multisequence head MRI without intravenous contrast.  2) 3D time-of-flight head MRA without intravenous  contrast.    FINDINGS:  HEAD MRI: Evaluation for metastatic disease is limited in the absence of contrast.  INTRACRANIAL CONTENTS: No acute or subacute infarct. No mass, acute hemorrhage, or extra-axial fluid collections. Patchy nonspecific T2/FLAIR hyperintensities within the cerebral white matter and jose most consistent with mild to moderate chronic   microvascular ischemic change. There is chronic cortical infarct of the medial left temporal and occipital lobes. Mild generalized cerebral atrophy. No hydrocephalus. Normal position of the cerebellar tonsils.     SELLA: No abnormality accounting for technique.    OSSEOUS STRUCTURES/SOFT TISSUES: Normal marrow signal. The major intracranial vascular flow voids are maintained.     ORBITS: Prior bilateral cataract surgery. Visualized portions of the orbits are otherwise unremarkable.     SINUSES/MASTOIDS: There is moderate mucosal thickening of left sphenoid sinus. No middle ear or mastoid effusion.     HEAD MRA:   ANTERIOR CIRCULATION: No stenosis/occlusion, aneurysm, or high flow vascular malformation. Standard [place] anatomy.    POSTERIOR CIRCULATION: There is gradual tapering and eventual occlusion of the distal left posterior cerebral artery. The right posterior cerebral artery is widely patent. No aneurysm, or high flow vascular malformation. Balanced vertebral arteries   supply a normal basilar artery.       Impression    IMPRESSION:  HEAD MRI:   1.  No acute intracranial process.  2.  Generalized brain atrophy and presumed microvascular ischemic changes as detailed above.  3.  Chronic left PCA distribution cortical infarct.  4.  Left sphenoid sinusitis.    HEAD MRA:   1.  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  2.  Otherwise unremarkable MRI without intravenous.   CT Foot Left w/o Contrast    Narrative    EXAM: CT FOOT LEFT W/O CONTRAST  LOCATION: [facility]  DATE: 6/19/2023    INDICATION:  Leukocytosis, small fluid collection on the dorsal aspect of the left foot with significant tenderness to palpation. Unclear if there is any trauma.  COMPARISON: 3/9/2021.  TECHNIQUE: Noncontrast. Axial, sagittal and coronal thin-section reconstruction. Dose reduction techniques were used.     FINDINGS: Bones are demineralized. There is a nonspecific area of increased soft tissue along the dorsal aspect of the foot measuring 1.6 x 0.6 cm at the level of the TMT joints. This measures up to 4.0 cm in length. This is not well characterized on   noncontrast CT.    No evidence of an acute displaced fracture.    Muscle bulk is maintained. There is no high-grade joint space narrowing or significant marginal osteophyte formation.       Impression    IMPRESSION:  1.  Nonspecific dorsal subcutaneous soft tissue mass along the patella measuring up to 4.0 cm in length. The imaging findings are nonspecific and can be seen with a complex cyst, hematoma/abscess or mass. If further imaging is needed, recommend MRI.    2.  No underlying acute displaced fracture of the foot.    3.  Bone demineralization.       Recent Labs   Lab 06/19/23  1030 06/19/23  1029 06/18/23  1327 06/18/23  1018 06/18/23  0711 06/17/23  1217   WBC  --  25.8*  --  25.1*  --  26.4*   HGB  --  13.7  --  13.6  --  13.7   MCV  --  89  --  87  --  89   PLT  --  280  --  250  --  210   INR  --   --   --   --   --  1.28*     --   --   --  141 136   POTASSIUM 3.0*  --  3.9  --  3.4*  3.4* 3.9   CHLORIDE 101  --   --   --  101 94*   CO2 25  --   --   --  27 29   BUN 28  --   --   --  26 21   CR 1.63*  --   --   --  1.61*  1.61* 1.94*   ANIONGAP 15  --   --   --  13 13   MESSI 9.0  --   --   --  8.3* 9.8   *  --   --   --  118 93   ALBUMIN  --   --   --   --   --  3.2*   PROTTOTAL  --   --   --   --   --  7.7   BILITOTAL  --   --   --   --   --  0.9   ALKPHOS  --   --   --   --   --  71   ALT  --   --   --   --   --  10   AST  --   --   --   --   --  23    LIPASE  --   --   --   --   --  32

## 2023-06-19 NOTE — PROGRESS NOTES
06/19/23 1510   Appointment Info   Signing Clinician's Name / Credentials (PT) Yadira Allred PT   Living Environment   People in Home spouse   Current Living Arrangements house   Home Accessibility stairs to enter home;stairs within home   Number of Stairs, Main Entrance 2   Stair Railings, Main Entrance railings on both sides of stairs   Number of Stairs, Within Home, Primary greater than 10 stairs  (Tri-level home)   Stair Railings, Within Home, Primary railing on right side (ascending)   Self-Care   Usual Activity Tolerance moderate   Current Activity Tolerance fair   Equipment Currently Used at Home cane, straight   Activity/Exercise/Self-Care Comment Reports independent with all household mobility using single end cane at baseline.   General Information   Onset of Illness/Injury or Date of Surgery 06/17/23   Referring Physician Dr Mateo Ziegler   Patient/Family Therapy Goals Statement (PT) None stated   Pertinent History of Current Problem (include personal factors and/or comorbidities that impact the POC) Admit for COPD exacerbation. PMH includes COPD, 2-4L O2 at baseline, HTN, breast Ca, anxiety.   Existing Precautions/Restrictions oxygen therapy device and L/min  (2-4L)   Pain Assessment   Patient Currently in Pain No   Transfers   Impairments Contributing to Impaired Transfers decreased strength   Comment, (Transfers) Sit<>stand recliner to FWW CGA.   Gait/Stairs (Locomotion)   [place] Level (Gait) contact guard   Assistive Device (Gait) walker, front-wheeled   Distance in Feet 10'x1   Distance in Feet (Gait) 70'x1   Pattern (Gait) step-through   Deviations/Abnormal Patterns (Gait) antalgic;gait speed decreased;stride length decreased   Clinical Impression   Criteria for Skilled Therapeutic Intervention Yes, treatment indicated   PT Diagnosis (PT) Impaired functional mobility   Influenced by the following impairments weakness, pain (L foot)   Functional limitations due to impairments transfers,  gait, stairs   Clinical Presentation (PT Evaluation Complexity) Stable/Uncomplicated   Clinical Presentation Rationale Presents as medically diagnosed.   Clinical Decision Making (Complexity) moderate complexity   Planned Therapy Interventions (PT) gait training;patient/family education;stair training;strengthening;transfer training   Anticipated Equipment Needs at Discharge (PT) walker, rolling   Risk & Benefits of therapy have been explained evaluation/treatment results reviewed;care plan/treatment goals reviewed;risks/benefits reviewed;participants voiced agreement with care plan;participants included;patient   PT Total Evaluation Time   PT Eval, Moderate Complexity Minutes (27791) 15   Physical Therapy Goals   PT Frequency Daily   PT Predicted Duration/Target Date for Goal Attainment 06/26/23   PT Goals Transfers;Gait;Stairs   PT: Transfers Supervision/stand-by assist;Sit to/from stand;Assistive device   PT: Gait Supervision/stand-by assist;Assistive device;Rolling walker;100 feet   PT: Stairs Supervision/stand-by assist;8 stairs;Assistive device;Rail on both sides   Interventions   Interventions Quick Adds Gait Training;Therapeutic Activity   Therapeutic Activity   Symptoms Noted During/After Treatment Fatigue;Shortness of breath   Treatment Detail/Skilled Intervention Sit<>stand recliner to FWW CGA with cues for safe hand placement.   Gait Training   Gait Training Minutes (69011) 15   Symptoms Noted During/After Treatment (Gait Training) fatigue;shortness of breath   Treatment Detail/Skilled Intervention Cues for posture, safety and PLB. Slow pace with 2 standing rest breaks due to fatigue/SOB. On 4L cont O2 via NC. Rest = 92%, Activity = 89%.   [place] Level (Gait Training) contact guard   Physical Assistance Level (Gait Training) verbal cues   Assistive Device (Gait Training) rolling walker   Pattern Analysis (Gait Training) other (see comments)  (step through)   Gait Analysis Deviations decreased  sachin;increased time in double stance;decreased step length   Impairments (Gait Analysis/Training) pain;strength decreased  (Top of L foot)   PT Discharge Planning   PT Plan Transfers, gait, stairs, BLE strengthening   PT Discharge Recommendation (DC Rec) (S)  home with assist   PT Rationale for DC Rec CGA for all mobility, home with assist from family as needed.   PT Brief overview of current status CGA for transfers, gait. Stairs not trialed yet.   Total Session Time   Timed Code Treatment Minutes 15   Total Session Time (sum of timed and untimed services) 30

## 2023-06-19 NOTE — PLAN OF CARE
Problem: Plan of Care - These are the overarching goals to be used throughout the patient stay.    Goal: Optimal Comfort and Wellbeing  Outcome: Progressing  Intervention: Monitor Pain and Promote Comfort     Goal Outcome Evaluation:       Pt A/Ox3 with some forgetfulness. Pt pain managed with tramadol and repositioning. IV zosyn given, tolerated well. Hayes in place, draining adequately. On Mag and K protocol. VSS, on 4 L O2 via NC.

## 2023-06-19 NOTE — PLAN OF CARE
Goal Outcome Evaluation:  Patient alert, forgetful to time orientation.  Patient's daughter expressing frustrations this AM, addressed plan of care with daughter and patient and provided ongoing explanations, patient representative card provided.  Dr. Ziegler notified on rounds regarding lab results and patient and family frustrations.  Patient short of breath on exertion, posterior lung sounds diminished.  Patient refused duo neb from RT for shortness of breath post activity, given prn inhaler per patient request.  Continuous oximetry in use.  Patient reports left foot tenderness on top of foot to touch, noticeable swelling to left foot.  Patient reports pain in back, left foot 4, declined further intervention other than scheduled Tramadol.  Patient down to CT of left foot via wheelchair with oxygen and staff.  Patient tolerated CT and now resting back in bed.  Dr. Ziegler aware of CT results, order for MRI of left foot placed.  MRI checklist faxed to MRI via [facility].  Plan to pre-medicate with po ativan prior to MRI.  MRI tech reported patient can be sitting up with pillows during MRI foot.  Dr. Ziegler verbalized therapy ok to see patient and to continue win catheter until CT/MRI testing completed today.  Patient updated regarding above and agreeable.  Called and updated patient's daughterLeelee regarding above.

## 2023-06-19 NOTE — PLAN OF CARE
Problem: Plan of Care - These are the overarching goals to be used throughout the patient stay.    Goal: Optimal Comfort and Wellbeing  Outcome: Progressing   Goal Outcome Evaluation:             Patient alert and oriented to self and place. Vital signs stable on O2 at 4LPM. Tramadol given for pain. Patient was able to tolerate laying flat in bed . Hayes cath inserted. Ativan given. Patient taken down for MRI of head. Ultrasound done on both legs. Will continue to monitor.

## 2023-06-19 NOTE — CONSULTS
Care Management Initial Consult    General Information  Assessment completed with: Patient   Type of CM/SW Visit: Initial Assessment    Primary Care Provider verified and updated as needed:   yes       Reason for Consult: discharge planning         Communication Assessment  Patient's communication style: spoken language (English or Bilingual)            Living Environment:   People in home: spouse     Current living Arrangements: house      Able to return to prior arrangements: yes       Family/Social Support:  Care provided by: self, spouse/significant other  Provides care for:  No one else  Marital Status:              Description of Support System: Supportive, Involved         Current Resources:   Patient receiving home care services:  Denied having home care  Community Resources:  denied  Equipment currently used at home:  cane  Supplies currently used at home: Oxygen Tubing/Supplies ([employer])    Employment/Financial:  Financial Concerns: No concerns identified        Lifestyle & Psychosocial Needs:      Functional Status:  Prior to admission patient needed assistance: independent        Mental Health Status:  Mental Health Status: No Current Concerns       Chemical Dependency Status:  Chemical Dependency Status: No Current Concerns             Values/Beliefs:  Spiritual, Cultural Beliefs, Nondenominational Practices, Values that affect care:  No support requested           Additional Information:  CM reviewed chart. CM met with and assessed patient. Patient reports that she lives in a private home with her .Reports that she is independent at baseline.  Denied having home care or community services prior to admission. Uses a cane and home 02. Home 02 2.5-3L. [employer] supplies her home 02. Mental Health Status: No Current Concerns/denied.  Chemical Dependency Status: No Current Concerns/denied.  Family will provide transportation home at time of hospital discharge. CM will follow.     Melissa Miller,  RN

## 2023-06-19 NOTE — PLAN OF CARE
Pt is alert and oriented x 4. Utilizing scheduled tramadol. On IV abx. Utilizing win for imaging procedures; can switch to Purewick once pt doesn't need more imaging. VSS. Tolerating a cardiac diet. Up with assist x1 with walker and gait belt. Anticipated discharge 6/20/23 home with family. Pt on 4L O2 NC. Pt on 2-4L O2 baseline at home.    New PIV in R AC    Mg and K protocol    MRI of foot ad lumbar spine    Pt back on the unit at 2300 from MRI.

## 2023-06-19 NOTE — PROGRESS NOTES
06/19/23 1415   Appointment Info   Signing Clinician's Name / Credentials (OT) Katie Mckeon, OTR/L   Living Environment   People in Home spouse   Current Living Arrangements house   Living Environment Comments walk-in shower, regular toilet, multi-level home   Self-Care   Equipment Currently Used at Home shower chair;cane, straight   Activity/Exercise/Self-Care Comment ind for BADLs   Instrumental Activities of Daily Living (IADL)   IADL Comments shares meals/cleaning tasks with spouse, daughter sets up meds, uses grocery delivery, doesn't drive   General Information   Onset of Illness/Injury or Date of Surgery 06/17/23   Referring Physician aMteo Ziegler MD   Patient/Family Therapy Goal Statement (OT) get stronger, less pain, improve breathing   Additional Occupational Profile Info/Pertinent History of Current Problem admitted with confusion, found to be fluid overloaded with elevated BNP, possible pneumonia. PMH COPD on 2-4L at home, chronic pain, nonischemic cardiomyopathy, breast cancer status post radiation   Existing Precautions/Restrictions fall;oxygen therapy device and L/min  (5L)   Cognitive Status Examination   Orientation Status orientation to person, place and time  (June 17, 2023)   Affect/Mental Status (Cognitive) anxious  (benefits from full review of session plan and options to adjust per tolerance)   Follows Commands WFL   Pain Assessment   Patient Currently in Pain No   Range of Motion Comprehensive   General Range of Motion bilateral upper extremity ROM WFL   Strength Comprehensive (MMT)   Comment, General Manual Muscle Testing (MMT) Assessment weakness BUE   Bed Mobility   Bed Mobility supine-sit   Supine-Sit [place] (Bed Mobility) supervision   Assistive Device (Bed Mobility) bed rails   Transfers   Transfers sit-stand transfer   Sit-Stand Transfer   Sit-Stand [place] (Transfers) contact guard;verbal cues   Sit/Stand Transfer Comments pt completing all tasks very slowly with ~3  min between each bout of activity d/t SOB   Balance   Balance Comments CGA with FWW for static standing   Activities of Daily Living   BADL Assessment/Intervention lower body dressing;toileting;bathing   Bathing Assessment/Intervention   Comment, (Bathing) per clinical judgement, CGA with significant increased time d/t SOB   Lower Body Dressing Assessment/Training   Comment, (Lower Body Dressing) per clinical judgement, CGA with significant increased time d/t SOB   Toileting   Comment, (Toileting) per clinical judgement, CGA with significant increased time d/t SOB   Clinical Impression   Criteria for Skilled Therapeutic Interventions Met (OT) Yes, treatment indicated   OT Diagnosis dec BADL indep   OT Problem List-Impairments impacting ADL problems related to;activity tolerance impaired;mobility;strength   Assessment of Occupational Performance 3-5 Performance Deficits   Identified Performance Deficits dressing, toileting, bathing, household mobility, functional transfers   Planned Therapy Interventions (OT) ADL retraining;cognition;strengthening;transfer training;home program guidelines;progressive activity/exercise   Clinical Decision Making Complexity (OT) moderate complexity   Risk & Benefits of therapy have been explained evaluation/treatment results reviewed;participants included;patient   OT Total Evaluation Time   OT Eval, Moderate Complexity Minutes (75257) 15   OT Goals   Therapy Frequency (OT) Daily   OT Predicted Duration/Target Date for Goal Attainment 06/26/23   OT Goals Lower Body Dressing;Toilet Transfer/Toileting;Upper Body Bathing;Lower Body Bathing   OT: Lower Body Dressing Supervision/stand-by assist   OT: Upper Body Bathing Supervision/stand-by assist   OT: Lower Body Bathing Supervision/stand-by assist   OT: Toilet Transfer/Toileting Supervision/stand-by assist   Self-Care/Home Management   Self-Care/Home Mgmt/ADL, Compensatory, Meal Prep Minutes (73703) 10   Symptoms Noted During/After  Treatment (Meal Preparation/Planning Training) shortness of breath   Treatment Detail/Skilled Intervention Educ pt on progression of activity to ease anxieties regarding participation. Once standing, VC for pt to use FWW to offload pain as needed. CGA with FWW and min VC for turning bed > chair and VC for sitting down. Significant SOB following requiring ~5 min to recover with PLB but O2 sats remain over 90% although limited reading initially.   OT Discharge Planning   OT Plan 6/26: track cog, LB dressing, toileting, progress to amb into bathroom, TB bathing, EC handouts/educ   OT Discharge Recommendation (DC Rec) home with assist;home with home care occupational therapy   OT Rationale for DC Rec completing transfers and ADLs with PRN assist d/t significant SOB. lives with spouse and daughter is supportive.   OT Brief overview of current status CGA bed > chair   Total Session Time   Timed Code Treatment Minutes 10   Total Session Time (sum of timed and untimed services) 25

## 2023-06-19 NOTE — PROGRESS NOTES
MD NOTIFICATION  Dr. Toney Perera paged regarding:  CT results back, ok to remove win or further testing?   Dr. Ziegler reported to leave win in, plan MRI.    Daughter, Leelee and patient notified regarding above.

## 2023-06-20 NOTE — CONSULTS
COPD Education Consult  6/20/2023, 12:52 PM  Reason for Consult: COPD education per COPD exacerbation primary admit diagnosis protocol  Patient Admitted for: COPD exacerbation (H) [J44.1]  Leukocytosis, unspecified type [D72.829]  Acute pneumonia [J18.9] on 6/17/2023     History of Present Illness: Stephanie is a 78 year old female with a previous history of COPD, oxygen dependent at 2 LPM, HTN, CHF, CKD, Brst Cancer post radiation, and Factor V. She was brought in by her family to ER for evaluation of altered mental status with episodes of confusion and hallucinations. She was febrile with lower extremity edema and complaint of shortness of breath, left foot pain, and thirst. Labs and imaging suggested mild fluid overload, acute kidney injury, Leukocytosis, Possible Pneumonia, RUL nodule with increased size over last 2 months, and possible seroma or hematoma on anterior of left foot. Per MD notes COPD exacerbation unlikely. Altered mental status has been improving with occasional confusion.  Elaina has been receiving treatment with bronchodilators, antibiotics, and steroids.  Patient sees Dr. Dietrich with [facility] for pulmonology care and has been following up with her regularly.    Last PFT:  Date: 10/4/19   Post-Spirometry:  FVC: 2.23, 88%  FEV1: 1.14, 58%  FEV1/FVC: 51%    Home Respiratory Medications:  Bronchodilators:   -Combivent PRN    Combination Therapy:   -Trelegy Ellipta inhaler Daily    Imaging:  Chest CT 6/17/23     FINDINGS:   LUNGS AND PLEURA: Advanced emphysema redemonstrated, scattered subpleural scarring and mild bronchial wall thickening redemonstrated. 12 x 7 mm nodular opacity right upper lobe image 95 series 4 previously measured 10 x 6 mm. Adjacent cluster of new   nodules, largest measuring measure 8 mm image 114.     New patchy consolidative airspace opacities within the lung bases, greater on the left, compatible with pneumonia. Band of scarring bronchiectasis left upper lobe along the major  fissure unchanged. Central airways are patent    Education:  Irene has been seen by our service on a previous admittance and has participated in our follow up call program.   Our service has not seen her on this admit for further education due to patient's unavailability due to testing and reported confusion and agitation when we were available. Our service will follow up with patient and family via phone post discharge.    -Recommendations:  -Continue current inpatient therapy, per [facility] protocols  -Follow up appointment with her PCP -Consult for cognition evaluation   -Medications patient is currently taking at home appear to be appropriate. Patient has a previous history of noncompliance with maintenance inhaler, She likes Trelegy, feels it works, and only has to take it once per day. For these reasons, she is more likely to remain compliant with it. Recommend no changes.      Stephanie will participate in our call back program. Will continue to follow patient throughout hospital stay along with educating patient and family.    Sharon Ryan RT, Chronic Pulmonary Disease Specialist  Phone [phone]

## 2023-06-20 NOTE — PROVIDER NOTIFICATION
only has Tylenol for pain. She already had her bedtime Neurontin. Is there anything else she can get?     Thank you

## 2023-06-20 NOTE — PROGRESS NOTES
[facility]    Hospitalist Progress Note    Date of Service (when I saw the patient): 06/20/2023    Assessment & Plan    Irene León is a pleasant 78 year old woman with advanced COPD on 2 to 4 L at home, hypertension, nonischemic cardiomyopathy (prior ejection fraction around 40%), and breast cancer status post radiation who presented to the hospital 6/17/23 after an episode of confusion.  On presentation, she had mild fluid overload with elevated BNP.  Urinalysis was negative for any infection.  CT chest abdomen pelvis showed new patchy infiltrate bibasilarly possibly pneumonia; she was started on vancomycin and Zosyn as the source of infection was unclear.   MRA and MRI brain on 6/18: no acute abnormalities, chronic left PCA distribution cortical infarct.    Current problems include:     Episode of confusion; much improved and likely near her pre-admission baseline (per her daughter).  Possible mild cognitive impairment  Episode of visual hallucination; no recurrence.  - per family, had significant confusion, visual hallucination on 6/17.  - remove win today, if still in place  - OT eval. 6/21 for prelim. cognitive testing     Possible bibasilar pneumonia; improving.  Persistent leukocytosis; resolved now - possibly due to recent IV steroid(s)/prednisone and/or infection.  Incidental finding on recent CT's of chest and abdomen  - CT chest abdomen pelvis showed cluster of nodules in the Right upper lobe (possibly infectious versus inflammatory).   - also referenced: 12 x 7 mm nodular opacity right upper lobe image -> previously measured 10 x 6 mm.   - continue Zosyn/Vanco today; likely can consolidate tomorrow  - Pulmonology consult 6/21 -> Stephanie/daughter are still interested in pursuing further diagnostic studies (possible biopsy) of the Right upper lobe opacity/ies.    COPD; stable, improving  Chronic Respiratory Failure with hypoxia; stable.  - continue current inhalers, nebs  -  add acapella QID w/ nebs  - continue RT    Anemia; unclear significance.  - repeat CBC w/ platelets 6/21    Poor PO intake, weight loss; possible malnutrition.  - RD consult in a.m.  - change diet from Cardiac to low Na/low fat    Left dorsal foot pain; Stephanie thinks she might have injured her Left foot recently; does not have pain with weight-bearing.  - possible thrombophlebitis  Abnormal findings on U/S and MRI; not c/w foot infection.  - add topical Wilmar-solorio PRN  - monitor for now; is improving.    Left lower back pain; chronic, without recent change or worsening.  - MRI findings not consistent with her usual pain or with infection  - continue current analgesics, increase activity as soon as able  - follow up with PCP and possibly Orthopedics after discharge    New 2 mm nonobstructing calculus right upper pole.  - follow up with Urology after discharge  - consider renal U/S per prior notes     Nonischemic cardiomyopathy  Mild fluid overload  - PTA was on Lasix 20 mg daily  - received IV fluid in the ED.  - started on Lasix 20 mg IV daily on 6/17-> continue while having diarrhea  - continue daily weights, as able  - continue PTA metoprolol succinate 75 mg nightly.    Acute kidney injury on CKD (stage III)  - baseline creatinine around 1.2  - Creatinine on presentation 1.9  - repeat BMP 6/21    Hypokalemia; ongoing.  - add replacement order set  - BMP in a.m.  - check Mg     QTc prolongation  - initially QTc ~ 520.   - held tramadol and atomoxetine.  - repeat EKG QTc was 480  -Family requested restarting tramadol      History of urinary incontinence  - PTA was on mirabegron 50 mg daily and solifenacin.  - continue PTA mirabergron.  - hold solifenacin given QTc prolongation      Diet: Combination Diet Low Saturated Fat Na <2400mg Diet; adding caffeine back today    DVT Prophylaxis: was on pneumatic Compression Devices; on eliquis  Hayes Catheter: PRESENT, indication: Other (Comment) (for imagaing procedures)  Lines:  None     Cardiac Monitoring: None  Code Status: Full Code          Clinically Significant Risk Factors      Hypokalemia: Lowest K = 3 mmol/L in last 2 days, will replace as needed       Hypoalbuminemia: Lowest albumin = 3.2 g/dL at 6/17/2023 12:17 PM, will monitor as appropriate  Coagulation Defect: INR = 1.28 (Ref range: 0.85 - 1.15) and/or PTT = N/A, will monitor for bleeding    Hypertension: Noted on problem list               Expected Discharge Date: 1-2 days      Destination: home with family        D. Ever Lima MD, [facility] Hospitalist  Text Page (7am - 6pm)    Interval History   Reviewed care in detail w/ RN, Stephanie and her daughter.  Reviewed all recent imaging/lab studies and their concerns.  She is now very close to baseline; no f/c/chest or abdominal discomfort.  2 or more loose BM's today.  Appetite poor-fair for ? weeks to months.  + weight loss over past 6+ months but she is not sure.  Leg edema much improved.  Has been up walking w/ assist; 02 sats decrease below 90% with activity, but probe has not been working consistently either.  Intermittent cough, mainly non-productive.    Data reviewed today: I reviewed all new labs and imaging results over the last 24 hours.     Physical Exam   Temp: 97.9  F (36.6  C) Temp src: Oral BP: 95/56 Pulse: 58   Resp: 18 SpO2: 98 % O2 Device: Nasal cannula Oxygen Delivery: 3 LPM  Vitals:    06/17/23 1201 06/19/23 0623   Weight: 47.6 kg (105 lb) 49 kg (108 lb 0.4 oz)     Vital Signs with Ranges  Temp:  [97.5  F (36.4  C)-98  F (36.7  C)] 97.9  F (36.6  C)  Pulse:  [58-64] 58  Resp:  [16-18] 18  BP: ()/(50-56) 95/56  SpO2:  [90 %-99 %] 98 %  I/O last 3 completed shifts:  In: 1320 [P.O.:1220; I.V.:100]  Out: 900 [Urine:900]    Constitutional: awake, no apparent distress; in recliner; appears thin.  HEENT: sclerae clear; MM's moist  Respiratory: good a/e bilaterally; decreased at Right base w/ faint rhonchi; no wheezing  Cardiovascular: irregular  rate and rhythm, S1, S2 noted; no m/r/g  GI: abdomen flat, + bowel sounds; soft, non-tender, non-distended  Skin/Integumen: several small wounds with scab/eschar both lower legs, w/ chronic venous stasis changes; no rashes, no cyanosis, no jaundice  Musculoskeletal: Left knee area: no masses or effusion noted; bilateral patella: good ROM, non-tender.  Left dorsal foot: 3 cm long raised area, without erythema; mildly tender.  Neurologic: follows directions well; no focal deficits; oriented to person, place; was off on date by 1 day.      Medications       fluticasone-vilanterol  1 puff Inhalation Daily    And     umeclidinium  1 puff Inhalation Daily     furosemide  20 mg Oral Daily     gabapentin  600 mg Oral At Bedtime     ipratropium  2 puff Inhalation 4x daily     metoprolol succinate ER  75 mg Oral At Bedtime     mirabegron  25 mg Oral Daily     piperacillin-tazobactam  3.375 g Intravenous Q8H     [Held by provider] predniSONE  20 mg Oral Daily     rivaroxaban ANTICOAGULANT  15 mg Oral At Bedtime     simvastatin  40 mg Oral At Bedtime     sodium chloride (PF)  3 mL Intracatheter Q8H     tamoxifen  20 mg Oral Daily at 4 pm     traMADol  50 mg Oral BID     vancomycin  500 mg Intravenous Q18H       Data   Recent Labs   Lab 06/20/23  1319 06/20/23  0648 06/19/23  1928 06/19/23  1030 06/19/23  1029 06/18/23  1327 06/18/23  1018 06/18/23  0711 06/17/23  1217   WBC  --  10.6  --   --  25.8*  --  25.1*  --  26.4*   HGB  --  10.7*  --   --  13.7  --  13.6  --  13.7   MCV  --  89  --   --  89  --  87  --  89   PLT  --  223  --   --  280  --  250  --  210   INR  --   --   --   --   --   --   --   --  1.28*   NA  --  140  --  141  --   --   --  141 136   POTASSIUM 3.4* 3.1* 3.7 3.0*  --    < >  --  3.4*  3.4* 3.9   CHLORIDE  --  104  --  101  --   --   --  101 94*   CO2  --  28  --  25  --   --   --  27 29   BUN  --  27  --  28  --   --   --  26 21   CR  --  1.29*  --  1.63*  --   --   --  1.61*  1.61* 1.94*   ANIONGAP   --  8  --  15  --   --   --  13 13   MESSI  --  8.0*  --  9.0  --   --   --  8.3* 9.8   GLC  --  118  --  151*  --   --   --  118 93   ALBUMIN  --   --   --   --   --   --   --   --  3.2*   PROTTOTAL  --   --   --   --   --   --   --   --  7.7   BILITOTAL  --   --   --   --   --   --   --   --  0.9   ALKPHOS  --   --   --   --   --   --   --   --  71   ALT  --   --   --   --   --   --   --   --  10   AST  --   --   --   --   --   --   --   --  23   LIPASE  --   --   --   --   --   --   --   --  32    < > = values in this interval not displayed.       Recent Results (from the past 24 hour(s))   MR Foot Left w/o Contrast    Narrative    EXAM: MR FOOT LEFT W/O CONTRAST  LOCATION: [facility]  DATE: 6/19/2023    INDICATION: History of breast cancer, persistent leukocytosis, tenderness to palpation of the dorsal aspect of the left foot. Imaging showed small mass. Further evaluation for any signs of infection or tumor.  COMPARISON: CT 6/19/2023.  TECHNIQUE: Unenhanced.    FINDINGS:     JOINTS AND BONES:   -Mild degenerative changes most marked at the 1st MTP joint.    TENDONS:   -No tendon tear, tendinopathy, or tenosynovitis.     LIGAMENTS:   -Lisfranc ligament: Intact. No subluxation.    MUSCLES AND SOFT TISSUES:   -In the dorsal aspect of the mid and forefoot regions there is a well-circumscribed serpiginous area of abnormal signal which measures approximately 1.5 x 0.8 x 4.0 cm which is of primarily decreased signal on both T1 and T2-weighted images. This   correlates with the same abnormality seen on today's CT examination. This should be atypical for changes of infection or neoplasm. If this area of abnormal soft tissue has been present for a significant period of time, then I would favor changes of a   benign entity such as a fibroma or neuroma. If this is relatively acute in etiology then changes such as superficial vein thrombosis should be considered.      Impression    IMPRESSION:  1.  I  would not favor changes of infection or neoplasm as the etiologies for the area of abnormal serpiginous increased soft tissue in the dorsal aspect of the foot.    2.  If the soft tissue is chronic in nature then I would favor benign entities such as a fibroma or neuroma.    3.  If the soft tissue is acute in nature, then I would favor changes such as superficial thrombophlebitis.   MR Lumbar Spine w/o & w Contrast    Narrative    EXAM: MR LUMBAR SPINE W/O and W CONTRAST  LOCATION: [facility]  DATE: 6/19/2023    INDICATION: Persistent leukocytosis.  History of breast cancer.  Lower back pain for the last 6 months  COMPARISON:  CT chest 07/06/2022.  CONTRAST: 4.5 mL Gadavist  TECHNIQUE: Routine Lumbar Spine MRI without and with IV contrast.    FINDINGS:   Nomenclature is based on 5 lumbar type vertebral bodies with L5-S1 defined on image 9 of series 8. 31 degrees lumbar dextrocurvature with the apex at L2-L3. Minimal degenerative anterolisthesis of L5 on S1.  Chronic T12 wedge compression deformity with   approximately 75% vertebral body height loss anteriorly. Minimal retropulsion of the T12 posterior inferior endplate, unchanged.  No suspicious marrow signal abnormality. Normal distal spinal cord and cauda equina with conus medullaris at L2-L3. No   abnormal intrathecal enhancement.     Left iliopsoas atrophy. Dorsal paraspinal muscular atrophy, right greater than left. Abdominal aortic ectasia. Incidental bilateral renal cysts. Left hip arthroplasty.    T12-L1: Osseous retropulsion. No significant disc herniation. Mild bilateral facet arthropathy. No significant canal or foraminal stenosis.     L1-L2: Mild bulge. Severe left eccentric disc height loss. Mild bilateral facet arthropathy. No significant canal or foraminal stenosis.    L2-L3: Mild bulge. Severe left eccentric disc height loss. Mild bilateral facet arthropathy. No significant canal or foraminal stenosis.     L3-L4: Mild bulge.  Moderate disc height loss. Mild bilateral facet arthropathy. No significant canal or foraminal stenosis.    L4-L5: Bulge with superimposed right foraminal protrusion. Moderate right eccentric disc height loss. Moderate right and mild left facet arthropathy. Severe right lateral recess stenosis. No significant canal or foraminal stenosis.    L5-S1: Anterolisthesis with unroofing of the disc. No significant disc herniation. Moderate disc height loss. Mild to moderate bilateral facet arthropathy. No significant canal or foraminal stenosis.      Impression    IMPRESSION:  1.  No lumbar spine findings to explain the patient's leukocytosis.  2.  Multilevel degenerative changes without high-grade canal or foraminal stenosis.  3.  Severe right L4-L5 lateral recess stenosis. Correlate for right L5 radiculopathy.

## 2023-06-20 NOTE — PHARMACY-VANCOMYCIN DOSING SERVICE
"Pharmacy Vancomycin Note  Date of Service 2023  Patient's  1945   78 year old, female    Indication: Community Acquired Pneumonia  Day of Therapy: 4  Current vancomycin regimen:  1000 mg IV q48h  Current vancomycin monitoring method: AUC  Current vancomycin therapeutic monitoring goal: 400-600 mg*h/L    InsightRX Prediction of Current Vancomycin Regimen  Loading dose: N/A  Regimen: 1000 mg IV every 48 hours.  Start time: 00:52 on 2023  Exposure target: AUC24 (range)400-600 mg/L.hr   AUC24,ss: 376 mg/L.hr  Probability of AUC24 > 400: 39 %  Ctrough,ss: 9.5 mg/L  Probability of Ctrough,ss > 20: 2 %  Probability of nephrotoxicity (Lodise LARA ): 5 %    Current estimated CrCl = Estimated Creatinine Clearance: 27.8 mL/min (A) (based on SCr of 1.29 mg/dL (H)).    Creatinine for last 3 days  2023: 12:17 PM Creatinine 1.94 mg/dL  2023:  7:11 AM Creatinine 1.61 mg/dL;  7:11 AM Creatinine 1.61 mg/dL  2023: 10:30 AM Creatinine 1.63 mg/dL  2023:  6:48 AM Creatinine 1.29 mg/dL    Recent Vancomycin Levels (past 3 days)  2023:  6:48 AM Vancomycin 21.9 mg/L    Vancomycin IV Administrations (past 72 hours)                   vancomycin (VANCOCIN) 1,000 mg in NaCl 0.9% 200 mL intermittent infusion (mg) 1,000 mg Given 23 005    vancomycin (VANCOCIN) 1,000 mg in NaCl 0.9% 200 mL intermittent infusion (mg) 1,000 mg Given 23                Nephrotoxins and other renal medications (From now, onward)    Start     Dose/Rate Route Frequency Ordered Stop    23 185  vancomycin (VANCOCIN) 500 mg vial to attach to  mL bag         500 mg  over 1 Hours Intravenous EVERY 18 HOURS 23 0944      23 0900  furosemide (LASIX) tablet 20 mg         20 mg Oral DAILY 23 19223 0930  piperacillin-tazobactam (ZOSYN) 3.375 g vial to attach to  mL bag        Note to Pharmacy: For [facility], [facility] and [facility]: For Zosyn-naive patients, use the \"Zosyn initial " "dose + extended infusion\" order panel.    3.375 g  over 240 Minutes Intravenous EVERY 8 HOURS 06/18/23 0922               Contrast Orders - past 72 hours (72h ago, onward)    Start     Dose/Rate Route Frequency Stop    06/19/23 2230  gadobutrol (GADAVIST) injection 4.5 mL         4.5 mL Intravenous ONCE 06/19/23 2242    06/17/23 1800  gadobutrol (GADAVIST) injection 5 mL  Status:  Discontinued         5 mL Intravenous ONCE 06/19/23 1247          Interpretation of levels and current regimen:  Vancomycin level is reflective of AUC less than 400    Has serum creatinine changed greater than 50% in last 72 hours: No    Urine output:  good urine output    Renal Function: Improving    InsightRX Prediction of Planned New Vancomycin Regimen  Loading dose: N/A  Regimen: 500 mg IV every 18 hours.  Start time: 00:52 on 06/22/2023  Exposure target: AUC24 (range)400-600 mg/L.hr   AUC24,ss: 487 mg/L.hr  Probability of AUC24 > 400: 82 %  Ctrough,ss: 16.4 mg/L  Probability of Ctrough,ss > 20: 23 %  Probability of nephrotoxicity (Lodise LARA 2009): 12 %    Plan:  1. Increase Dose to 500 mg every 18 hours  2. Vancomycin monitoring method: AUC  3. Vancomycin therapeutic monitoring goal: 400-600 mg*h/L  4. Pharmacy will check vancomycin levels as appropriate in 1-3 Days.  5. Serum creatinine levels will be ordered daily for the first week of therapy and at least twice weekly for subsequent weeks.    Ivy Hwang [facility]    "

## 2023-06-20 NOTE — PLAN OF CARE
Overall pt is doing well. She is on 3L NC (Home O2 settings), continues to receive IV antibiotics. She started having some diarrhea today, 3 episodes of loose stool. Denies abdominal pain, afebrile. A & O x4. Ambulating with walker and gait belt with the assist of 1. K and Mg protocol.     Stacy Milligan RN on 6/20/2023 at 6:39 PM

## 2023-06-20 NOTE — PLAN OF CARE
Goal Outcome Evaluation:      Plan of Care Reviewed With: patient    Overall Patient Progress: improvingOverall Patient Progress: improving    Outcome Evaluation: Pt alert and oriented x 4. New IV placed in left forarm with US and works well. IV Vanco and zoysn continued overnight. Denied SOB. On 3L NC. Last MRI competed at 2300, so win can be removed if no other tests are ordered. Pt reports she can repo independently. Oxy 10mg given for lower back pain with relief. Discharge pending clinical improvement.      Problem: Plan of Care - These are the overarching goals to be used throughout the patient stay.    Goal: Absence of Hospital-Acquired Illness or Injury  Intervention: Prevent Skin Injury  Recent Flowsheet Documentation  Taken 6/20/2023 0200 by Lennox Whittington RN  Body Position: position changed independently  Taken 6/20/2023 0003 by Lennox Whittington RN  Body Position: position changed independently     Problem: Pain Chronic (Persistent)  Goal: Optimal Pain Control and Function  Outcome: Progressing  Intervention: Develop Pain Management Plan  Recent Flowsheet Documentation  Taken 6/20/2023 0003 by Lennox Whittington RN  Pain Management Interventions:   pain management plan reviewed with patient/caregiver   medication (see MAR)   repositioned   rest  Intervention: Manage Persistent Pain  Recent Flowsheet Documentation  Taken 6/20/2023 0003 by Lennox Whittington RN  Medication Review/Management: medications reviewed

## 2023-06-21 NOTE — PLAN OF CARE
Problem: COPD (Chronic Obstructive Pulmonary Disease) Comorbidity  Goal: Maintenance of COPD Symptom Control  Intervention: Maintain COPD-Symptom Control    Problem: Pain Chronic (Persistent)  Goal: Optimal Pain Control and Function  Outcome: Progressing  Intervention: Develop Pain Management Plan  Intervention: Manage Persistent Pain  VSS on 3L at rest. Dyspnea on exertion, requiring 5L and adequate rest periods with activity. Pain managed with PRN medications and rest.

## 2023-06-21 NOTE — PROGRESS NOTES
"CLINICAL NUTRITION SERVICES - ASSESSMENT NOTE     Nutrition Prescription    RECOMMENDATIONS FOR MDs/PROVIDERS TO ORDER:  None at this time    Malnutrition Status:    Patient does not meet two of the established criteria necessary for diagnosing malnutrition    Recommendations already ordered by Registered Dietitian (RD):  - Encourage po intake/suggest foods that pt is allowed to order on current diet  - Medical food supplement therapy: Ensure Enlive daily between meals @10am - strawberry    Future/Additional Recommendations:  Monitor po intake% and adequacy, ONS tolerance/preference, wts     REASON FOR ASSESSMENT  Irene León is a/an 78 year old female assessed by the dietitian for Provider Order - Please eval. re. nutritional risk, decreased PO intake, weight loss.     NUTRITION HISTORY  Dx: confusion  PMH: advanced COPD on 2 to 4 L at home, chronic pain, hypertension, nonischemic cardiomyopathy ejection fraction around 40%, breast cancer status post radiation    Spoke with pt this morning, whose main complaint was the restrictions on her cardiac diet, who has felt discouraged/unmotivated to order meals because of it. Writer offered menu recommendations that fit her current diet (penne pasta and meat sauce, for example), which she agreed to order and try. Pt agreeable to receiving ONS to round out her intake. Pt also reports she's gained 5 lbs in the past 3 months.    CURRENT NUTRITION ORDERS  Diet: Low Saturated Fat/2400 mg Sodium and Regular  Intake/Tolerance: Pt ordering 2 semi-adequate meals/day, and consuming % of them, with several snacks noted from nurses, [facility], and flow sheets.     LABS  Labs reviewed  Cr: 1.37 (H)    MEDICATIONS  Medications reviewed  Mg standard protocol  K+ standard protool  Lasix    GI:   Last BM 6/21  4x BMs on 6/20 - loose    ANTHROPOMETRICS  Height: 149.9 cm (4' 11\")  Most Recent Weight: 53 kg (116 lb 12.8 oz)    BMI: Normal BMI  Weight History:   06/21/23  53 kg " (116 lb 12.8 oz)  06/19/23  49 kg (108 lb 0.4 oz)  06/17/23  47.6 kg (105 lb)  05/18/23 50.3 kg (111 lb)  04/17/23 49.2 kg (108 lb 8 oz)  Pt wt is up 6.4 kg since admit - likely related to fluid retention/edema    Dosing Weight: 53 kg actual body wt    ASSESSED NUTRITION NEEDS  Estimated Energy Needs: 5880-7344 kcals/day (25 - 30 kcals/kg)  Justification: Maintenance  Estimated Protein Needs: 42-53 grams protein/day (0.8 - 1 grams of pro/kg)  Justification: Maintenance  Estimated Fluid Needs: 6118-2891 mL/day (1 mL/kcal)   Justification: Maintenance    PHYSICAL FINDINGS  Bruise on right wrist from IV    MALNUTRITION  % Intake: Decreased intake does not meet criteria  % Weight Loss: None noted  Subcutaneous Fat Loss: None observed  Muscle Loss: mild-moderate generalized, appears to be age-related  Fluid Accumulation/Edema: Mild in both ankles and both feet  Malnutrition Diagnosis: Patient does not meet two of the established criteria necessary for diagnosing malnutrition    NUTRITION DIAGNOSIS  Inadequate oral intake related to dietary restrictions as evidenced by pt ordering less food than she'd like, and c/o restrictive diet making food unappealing    INTERVENTIONS  Implementation  Encourage po intake/suggest foods that pt is allowed to order on current diet  Medical food supplement therapy - Ensure Enlive daily - strawberry    Goals  Patient to consume % of nutritionally adequate meal trays TID, or the equivalent with supplements/snacks.     Monitoring/Evaluation  Progress toward goals will be monitored and evaluated per protocol.

## 2023-06-21 NOTE — PROGRESS NOTES
[facility]    Medicine Progress Note - Hospitalist Service    Date of Admission:  6/17/2023    Assessment & Plan     Irene León is a pleasant 78 year old woman with advanced COPD on 2 to 4 L at home, hypertension, nonischemic cardiomyopathy (prior ejection fraction around 40%), and breast cancer status post radiation who presented to the hospital 6/17/23 after an episode of confusion.  On presentation, she had mild fluid overload with elevated BNP.  Urinalysis was negative for any infection.  CT chest abdomen pelvis showed new patchy infiltrate bibasilarly possibly pneumonia; she was started on vancomycin and Zosyn as the source of infection was unclear.   MRA and MRI brain on 6/18: no acute abnormalities, chronic left PCA distribution cortical infarct.      Current problems include:     1. Acute metabolic encephalopathy         much improved and likely near her pre-admission baseline (per her daughter).      2. Possible mild cognitive impairment  Episode of visual hallucination; no recurrence.  - per family, had significant confusion, visual hallucination on 6/17.  - OT eval. 6/21 for prelim. cognitive testing -pending     3. Possible bibasilar pneumonia; improving.  Currently on IV zosyn and IV vanco  Clinically improving   Will discuss if pulmonary has any abx recommendations  ? Need for IV vanco - no MRSA swab yet obtained    4. Persistent leukocytosis; resolved now - possibly due to recent IV steroid(s)/prednisone and/or infection.    5. Incidental finding on recent CT's of chest and abdomen  - CT chest abdomen pelvis showed cluster of nodules in the Right upper lobe (possibly infectious versus inflammatory).   - also referenced: 12 x 7 mm nodular opacity right upper lobe image -> previously measured 10 x 6 mm.     - Pulmonology consulted - pending     6. COPD; stable, improving  Chronic Respiratory Failure with hypoxia; stable.  - continue current inhalers, nebs  - add acapella  QID w/ nebs  - continue RT    7.  Left sided chest pain  ? New today - pt is not sure  Check troponin  Will obtain basic rib xrays to r/o rx   no rash seen to the area consistent with zoster    8. Anemia; unclear significance.  - repeat CBC w/ platelets 6/21     9. Poor PO intake, weight loss; possible malnutrition.  - RD consult pending  - change diet from Cardiac to low Na/low fat     10. Left dorsal foot pain; Stephanie thinks she might have injured her Left foot recently; does not have pain with weight-bearing.  No change on exam today    CT 6/19/23 with no fracture - probable hematoma, as clinically suspected as appears today    11. Left lower back pain;        Severe right-sided L4-5 stenosis     chronic, without recent change or worsening.  - MRI findings not consistent with her usual pain or with infection  - continue current analgesics, increase activity as soon as able  (no specific c/o right-sided radiculopathy)  - follow up with PCP and possibly Orthopedics after discharge     12. New 2 mm nonobstructing calculus right upper pole.  - follow up with Urology after discharge  - consider renal U/S per prior notes     13. Nonischemic cardiomyopathy  Mild fluid overload  - PTA was on Lasix 20 mg daily  - received IV fluid in the ED.  - started on Lasix 20 mg IV daily on 6/17-> continue while having diarrhea  - continue daily weights, as able  - continue PTA metoprolol succinate 75 mg nightly.     14. Acute kidney injury on CKD (stage III)  - baseline creatinine around 1.2  - Creatinine on presentation 1.9   creat this am 1.37  Will hold further IV lasix (last dose was 6/19/23)  On lasix 20mg po daily since 6/20/23  BMP in the am       15. Hypokalemia  Resolved on lab today     16. QTc prolongation  - initially QTc ~ 520.   - held tramadol and atomoxetine.  - repeat EKG QTc was 480  -Family requested restarting tramadol      17. History of urinary incontinence  - PTA was on mirabegron 50 mg daily and  solifenacin.  - continue PTA mirabergron.  - hold solifenacin given QTc prolongation      Diet: Combination Diet Low Saturated Fat Na <2400mg Diet; adding caffeine back today    DVT Prophylaxis: was on pneumatic Compression Devices; on eliquis  Hayes Catheter: PRESENT, indication: Other (Comment) (for imagaing procedures)  Lines: None     Cardiac Monitoring: None  Code Status: Full Code          Clinically Significant Risk Factors       Hypokalemia: Lowest K = 3 mmol/L in last 2 days, will replace as needed       Hypoalbuminemia: Lowest albumin = 3.2 g/dL at 6/17/2023 12:17 PM, will monitor as appropriate  Coagulation Defect: INR = 1.28 (Ref range: 0.85 - 1.15) and/or PTT = N/A, will monitor for bleeding    Hypertension: Noted on problem list               Expected Discharge Date: 1-2 days      Destination: home with family vs TCU  PT/OT consults requested      Medical Decision Making     52 MINUTES SPENT BY ME on the date of service doing chart review, history, exam, documentation & further activities per the note.        PPE Worn:  Mask, gloves     Diet: Combination Diet Low Saturated Fat Na <2400mg Diet    DVT Prophylaxis: DOAC  Ahyes Catheter: Not present  Lines: None     Cardiac Monitoring: None  Code Status: Full Code      Clinically Significant Risk Factors        # Hypokalemia: Lowest K = 3 mmol/L in last 2 days, will replace as needed       # Hypoalbuminemia: Lowest albumin = 3.2 g/dL at 6/17/2023 12:17 PM, will monitor as appropriate     # Hypertension: Noted on problem list                 Disposition Plan     Expected Discharge Date: 06/21/2023      Destination: home with family  Discharge Comments: Needs MRI of GUERA Ramírez DO  Hospitalist Service  [facility]  Securely message with [employer] (more info)  Text page via [employer] Paging/Directory   ______________________________________________________________________    Interval History   Slept on and off -  "tired.  Back hurts when awake and hard to get comfortable.  New pain in the left chest wall area - \"I feel like it is moving when I breath\"---awoke with it.  Thinks she maybe pulled a muscle trying to get comfortable.  No HA.  Feels breathing \"OK\" and maybe a little better this am.     Physical Exam   Vital Signs: Temp: 98  F (36.7  C) Temp src: Oral BP: 99/55 Pulse: 60   Resp: 18 SpO2: 94 % O2 Device: Nasal cannula Oxygen Delivery: 3 LPM  Weight: 116 lbs 12.8 oz    GEN:  Alert, sleeping in chair when I enter the room but easily arousable  cachectic and mild respiratory distress  HEENT:  Normocephalic/atraumatic, no scleral icterus, no nasal discharge, mouth appears moist  CV:  Regular rate and rhythm, no clear loud murmur or JVD.  S1 + S2 noted, no S3 or S4.  LUNGS:  Clear to auscultation ant/lat bilaterally without rales/rhonchi/wheezing/retractions.  Symmetric chest rise on inhalation noted.  ABD:  Active bowel sounds, soft, non-tender/not reatlly distended.  No rebound/guarding/rigidity.  EXT:  Chronic venous discoloration changes to pretibial area with 1+ pitting pretibial edema bilaterally.      Medications       fluticasone-vilanterol  1 puff Inhalation Daily    And     umeclidinium  1 puff Inhalation Daily     furosemide  20 mg Oral Daily     gabapentin  600 mg Oral At Bedtime     ipratropium  2 puff Inhalation 4x daily     metoprolol succinate ER  75 mg Oral At Bedtime     mirabegron  25 mg Oral Daily     piperacillin-tazobactam  3.375 g Intravenous Q8H     [Held by provider] predniSONE  20 mg Oral Daily     rivaroxaban ANTICOAGULANT  15 mg Oral At Bedtime     simvastatin  40 mg Oral At Bedtime     sodium chloride (PF)  3 mL Intracatheter Q8H     tamoxifen  20 mg Oral Daily at 4 pm     traMADol  50 mg Oral BID     vancomycin  500 mg Intravenous Q18H       Data     Labs and Imaging results below reviewed today.  Recent Labs   Lab 06/21/23  0736 06/20/23  0648 06/19/23  1029 06/18/23  1018   WBC  --  10.6 " 25.8* 25.1*   HGB 12.3 10.7* 13.7 13.6   HCT  --  34.1* 43.5 42.2   MCV  --  89 89 87   PLT  --  223 280 250     Recent Labs   Lab 06/21/23  0736 06/20/23  2344 06/20/23  1319 06/20/23  0648 06/19/23  1928 06/19/23  1030     --   --  140  --  141   POTASSIUM 3.8 3.7 3.4* 3.1*   < > 3.0*   CHLORIDE 108*  --   --  104  --  101   CO2 26  --   --  28  --  25   ANIONGAP 8  --   --  8  --  15   GLC 83  --   --  118  --  151*   BUN 20  --   --  27  --  28   CR 1.37*  --   --  1.29*  --  1.63*   GFRESTIMATED 39*  --   --  42*  --  32*   MESSI 8.3*  --   --  8.0*  --  9.0    < > = values in this interval not displayed.     7-Day Micro Results     Collected Updated Procedure Result Status      06/17/2023 1342 06/21/2023 1501 Blood Culture Peripheral Blood [[id number]]   Peripheral Blood    Preliminary result Component Value   Culture No growth after 4 days  [P]                06/17/2023 1326 06/21/2023 1501 Blood Culture Peripheral Blood [[id number]]   Peripheral Blood    Preliminary result Component Value   Culture No growth after 4 days  [P]                06/17/2023 1326 06/17/2023 1413 Symptomatic Influenza A/B, RSV, & SARS-CoV2 PCR (COVID-19) Nasopharyngeal [[id number]]    Swab from Nasopharyngeal    Final result Component Value   Influenza A PCR Negative   Influenza B PCR Negative   RSV PCR Negative   SARS CoV2 PCR Negative   NEGATIVE: SARS-CoV-2 (COVID-19) RNA not detected, presumed negative.                Recent Labs   Lab 06/21/23  0736 06/20/23  2344 06/20/23  1319 06/20/23  0648 06/19/23  1928 06/19/23  1030 06/18/23  0711 06/17/23  1217     --   --  140  --  141   < > 136   POTASSIUM 3.8 3.7 3.4* 3.1*   < > 3.0*   < > 3.9   CHLORIDE 108*  --   --  104  --  101   < > 94*   CO2 26  --   --  28  --  25   < > 29   ANIONGAP 8  --   --  8  --  15   < > 13   GLC 83  --   --  118  --  151*   < > 93   BUN 20  --   --  27  --  28   < > 21   CR 1.37*  --   --  1.29*  --  1.63*   < > 1.94*   GFRESTIMATED 39*   --   --  42*  --  32*   < > 26*   MESSI 8.3*  --   --  8.0*  --  9.0   < > 9.8   MAG 2.0  --  2.0 2.1  --  2.2   < > 2.1   PROTTOTAL  --   --   --   --   --   --   --  7.7   ALBUMIN  --   --   --   --   --   --   --  3.2*   BILITOTAL  --   --   --   --   --   --   --  0.9   ALKPHOS  --   --   --   --   --   --   --  71   AST  --   --   --   --   --   --   --  23   ALT  --   --   --   --   --   --   --  10    < > = values in this interval not displayed.     No results for input(s): NTBNPI, NTBNP in the last 168 hours.  Recent Labs   Lab 06/17/23  1217   INR 1.28*     Recent Labs   Lab 06/17/23  1217   LACT 1.7       No results found for this or any previous visit (from the past 24 hour(s)).

## 2023-06-21 NOTE — PLAN OF CARE
Goal Outcome Evaluation:      Plan of Care Reviewed With: patient    Overall Patient Progress: no change  Pt eating OK. Encouraged her to order more, and suggested healthy options that comply with her current diet order.

## 2023-06-21 NOTE — PLAN OF CARE
"Problem: Pain Chronic   Goal: Optimal Pain Control and Function  Pt given PRN oxycodone for c/o chronic back pain of +4. Pt rates pain a +3 on follow up. Effective per pt as she was \"able to sleep\".     Problem: Pneumonia  Goal: Effective Oxygenation and Ventilation  Pt is on supplemental oxygen per home routine of 3 liters at rest and 5 liters with activity. Pt quite dyspneic with activity with a slow recovery time which pt states is her baseline. IV abx given per orders. WBC 26.4 on admission; Have since normalized at 10.6.   "

## 2023-06-21 NOTE — CONSULTS
[facility] Pulmonary/Critical Care Consult Team Note    Irene León,  1945, MRN [id number]  Admitting Dx: COPD exacerbation (H) [J44.1]  Leukocytosis, unspecified type [D72.829]  Acute pneumonia [J18.9]  Date / Time of Admission:  2023 11:56 AM    Recent Events:  Intake/Output last 3 shifts:  I/O last 3 completed shifts:  In: 1160 [P.O.:960; I.V.:200]  Out: 600 [Urine:600]  She is feeling much better today.  She iis sitting up in a chair, feels like herself again  We had a long discussion (also with her daughter on the phone) about the approach to the lung nodule. She will need to clear this infection and then discuss the right diagnostic approach.     Assessment/Plan: Irene León is a 78 year old female with PMHx of HTN, Breast CA s/p radiation, COPD on home O2 2L at rest and 4 with exertion who presents with confusion and new patchy infiltrate on CT Chest.     Left Sided PNA  - agree with Abx  - no strong indication for steroids unless the pt starts to wheeze or is not clinically improving    Right lung nodule  - she will reach out to her Pulmonologist (has an appointment already in July)  - see if they want a PET or anything else prior to her visit.  - Will also refer her to our lung clinic (she would like a second opinion)  - I have notified our clinic and placed order    COPD on home O2  - will refer to pulmonary rehab to help her exercise capacity.    Medical Care Time excluding procedures and family discussions greater than: 1 Hour    Risk Factors Present on Admission:  Clinically Significant Risk Factors        # Hypokalemia: Lowest K = 3.1 mmol/L in last 2 days, will replace as needed       # Hypoalbuminemia: Lowest albumin = 3.2 g/dL at 2023 12:17 PM, will monitor as appropriate     # Hypertension: Noted on problem list                        Michelle Nettles DO  Pulmonary and Critical Care Attending  pgr [phone]    Allergies   Allergen Reactions     Sulfa (Sulfonamide  "Antibiotics) [Sulfa Antibiotics] Rash     Headaches and dizziness.     Homeopathic Drugs [External Allergen Needs Reconciliation - See Comment] Unknown and Other (See Comments)     Comment: runny nose, Comment: runny nose     Mold Extracts [Molds & Smuts] Unknown     Morphine (Pf) [Morphine] Unknown     hallucinate     Lisinopril Cough, Unknown and Itching     Comment: cough, Comment: cough     Sulfacetamide Sodium [Sulfacetamide] Rash       Meds: See MAR    Physical Exam:  /75 (BP Location: Right arm)   Pulse 75   Temp 97.9  F (36.6  C) (Oral)   Resp 18   Ht 1.499 m (4' 11\")   Wt 53 kg (116 lb 12.8 oz)   SpO2 94%   BMI 23.59 kg/m    Intake/Output this shift:  I/O this shift:  In: 118 [I.V.:118]  Out: -   GEN: sitting up in a chair, NAD  HEENT: MMM, NC in place  CVS: regular rhythm, no murmurs  RESP: Distant breath sounds, faint crackles, no wheezing  ABD: Soft, No abdominal pain with palpation, no guarding, no rigidity  EXT: Warm, well perfused, no edema  NEURO: moving all extremities, nonfocal  PSYCH: pleasant    Pertinent Labs: Latest lab results in EHR personally reviewed.   CMP  Recent Labs   Lab 06/21/23  0736 06/20/23  2344 06/20/23  1319 06/20/23  0648 06/19/23  1928 06/19/23  1030 06/18/23  1327 06/18/23  0711 06/17/23  1217     --   --  140  --  141  --  141 136   POTASSIUM 3.8 3.7 3.4* 3.1*   < > 3.0*   < > 3.4*  3.4* 3.9   CHLORIDE 108*  --   --  104  --  101  --  101 94*   CO2 26  --   --  28  --  25  --  27 29   ANIONGAP 8  --   --  8  --  15  --  13 13   GLC 83  --   --  118  --  151*  --  118 93   BUN 20  --   --  27  --  28  --  26 21   CR 1.37*  --   --  1.29*  --  1.63*  --  1.61*  1.61* 1.94*   GFRESTIMATED 39*  --   --  42*  --  32*  --  32*  32* 26*   MESSI 8.3*  --   --  8.0*  --  9.0  --  8.3* 9.8   MAG 2.0  --  2.0 2.1  --  2.2  --  2.0 2.1   PROTTOTAL  --   --   --   --   --   --   --   --  7.7   ALBUMIN  --   --   --   --   --   --   --   --  3.2*   BILITOTAL  --   --   " --   --   --   --   --   --  0.9   ALKPHOS  --   --   --   --   --   --   --   --  71   AST  --   --   --   --   --   --   --   --  23   ALT  --   --   --   --   --   --   --   --  10    < > = values in this interval not displayed.     CBC  Recent Labs   Lab 06/21/23  0736 06/20/23  0648 06/19/23  1029 06/18/23  1018 06/17/23  1217   WBC  --  10.6 25.8* 25.1* 26.4*   RBC  --  3.83 4.87 4.87 4.82   HGB 12.3 10.7* 13.7 13.6 13.7   HCT  --  34.1* 43.5 42.2 42.7   MCV  --  89 89 87 89   MCH  --  27.9 28.1 27.9 28.4   MCHC  --  31.4* 31.5 32.2 32.1   RDW  --  14.7 14.8 14.4 14.3   PLT  --  223 280 250 210     INR  Recent Labs   Lab 06/17/23  1217   INR 1.28*     Arterial Blood GasNo lab results found in last 7 days.    Cultures: not yet available.    Imaging: personally reviewed.   Results for orders placed or performed during the hospital encounter of 06/17/23   Head CT w/o contrast    Narrative    EXAM: CT HEAD W/O CONTRAST  LOCATION: [facility]  DATE: 6/17/2023    INDICATION: ams  COMPARISON: 03/23/2021  TECHNIQUE: Routine CT Head without IV contrast. Multiplanar reformats. Dose reduction techniques were used.    FINDINGS:  INTRACRANIAL CONTENTS: No intracranial hemorrhage, extraaxial collection, or mass effect.  No CT evidence of acute infarct. Moderate presumed chronic small vessel ischemic changes. Similar chronic cortical infarct of the inferior left temporal occipital   lobe. Intracranial atherosclerosis is present. Mild to moderate generalized volume loss. No hydrocephalus.     VISUALIZED ORBITS/SINUSES/MASTOIDS: No intraorbital abnormality. No paranasal sinus mucosal disease. No middle ear or mastoid effusion.    BONES/SOFT TISSUES: No acute abnormality.      Impression    IMPRESSION:  1.  No CT evidence for acute intracranial process.  2.  Brain atrophy and presumed chronic microvascular ischemic changes as above.  3.  Chronic left temporal occipital infarct, similar prior.   Chest  XR,  PA & LAT    Narrative    EXAM: XR CHEST 2 VIEWS  LOCATION: [facility]  DATE: 6/17/2023    INDICATION: Hypoxia.  COMPARISON: 04/10/2022      Impression    IMPRESSION: Mild bandlike opacities in the left upper lung are likely due to scarring from the more extensive infiltrates and consolidation in this location on 04/10/2022. Additional patchy interstitial opacities throughout both lungs are likely due to   scarring. No definite new infiltrates. Normal heart size and pulmonary vascularity. Old healed bilateral rib fractures. No significant pleural effusions.   CT Chest w/o Contrast    Narrative    EXAM: CT CHEST W/O CONTRAST, CT ABDOMEN PELVIS W/O CONTRAST  LOCATION: [facility]  DATE: 6/17/2023    INDICATION: Shortness of breath, confusion and leukocytosis. History of COPD. Increased opacities left lung on chest radiograph. Sepsis and acute kidney injury.  COMPARISON: 2 view chest 06/17/2023, CT chest 04/10/2023, CT pelvis 10/11/2022, CT chest abdomen pelvis 04/08/2022  TECHNIQUE: CT scan of the chest, abdomen, and pelvis was performed without IV contrast. Multiplanar reformats were obtained. Dose reduction techniques were used.   CONTRAST: None.    FINDINGS:   LUNGS AND PLEURA: Advanced emphysema redemonstrated, scattered subpleural scarring and mild bronchial wall thickening redemonstrated. 12 x 7 mm nodular opacity right upper lobe image 95 series 4 previously measured 10 x 6 mm. Adjacent cluster of new   nodules, largest measuring measure 8 mm image 114.    New patchy consolidative airspace opacities within the lung bases, greater on the left, compatible with pneumonia. Band of scarring bronchiectasis left upper lobe along the major fissure unchanged. Central airways are patent.    MEDIASTINUM/AXILLAE: No mass/adenopathy nor pericardial effusion. Mild cardiomegaly, overall decreased    CORONARY ARTERY CALCIFICATION: Moderate.    HEPATOBILIARY:  Normal.    PANCREAS: Normal.    SPLEEN: Normal.    ADRENAL GLANDS: Normal.    KIDNEYS/BLADDER: Mild bilateral pelvocaliectasis is new compared to prior studies. 2 mm calcification right upper pole is new. No ureteral calculus. The bladder is negative.    BOWEL: Diverticulosis of the colon. No acute inflammatory change. No obstruction. Normal appendix.    LYMPH NODES: No lymphadenopathy.    VASCULATURE: Aortic ectasia with moderate aortoiliac atherosclerosis. No aneurysm.    PELVIC ORGANS: Normal.    MUSCULOSKELETAL: Generalized demineralization and rightward lumbar scoliosis redemonstrated. Stable chronic T12 compression deformity. No suspicious osseous lesions.      Impression    IMPRESSION:  1.  New patchy consolidative airspace opacities within the lung bases, greater on the left, compatible with pneumonia.  2.  Increase in clustered nodular opacities right upper lobe may be infectious/inflammatory. Short interval follow-up CT in 3 months could be performed to reevaluate.  3.  Mild bilateral pelvocaliectasis new compared to prior studies. New 2 mm nonobstructing calculus right upper pole. No ureteral calculus. Consider short interval follow-up renal ultrasound with pre-/post void bladder.     CT Abdomen Pelvis w/o Contrast    Narrative    EXAM: CT CHEST W/O CONTRAST, CT ABDOMEN PELVIS W/O CONTRAST  LOCATION: [facility]  DATE: 6/17/2023    INDICATION: Shortness of breath, confusion and leukocytosis. History of COPD. Increased opacities left lung on chest radiograph. Sepsis and acute kidney injury.  COMPARISON: 2 view chest 06/17/2023, CT chest 04/10/2023, CT pelvis 10/11/2022, CT chest abdomen pelvis 04/08/2022  TECHNIQUE: CT scan of the chest, abdomen, and pelvis was performed without IV contrast. Multiplanar reformats were obtained. Dose reduction techniques were used.   CONTRAST: None.    FINDINGS:   LUNGS AND PLEURA: Advanced emphysema redemonstrated, scattered subpleural scarring and  mild bronchial wall thickening redemonstrated. 12 x 7 mm nodular opacity right upper lobe image 95 series 4 previously measured 10 x 6 mm. Adjacent cluster of new   nodules, largest measuring measure 8 mm image 114.    New patchy consolidative airspace opacities within the lung bases, greater on the left, compatible with pneumonia. Band of scarring bronchiectasis left upper lobe along the major fissure unchanged. Central airways are patent.    MEDIASTINUM/AXILLAE: No mass/adenopathy nor pericardial effusion. Mild cardiomegaly, overall decreased    CORONARY ARTERY CALCIFICATION: Moderate.    HEPATOBILIARY: Normal.    PANCREAS: Normal.    SPLEEN: Normal.    ADRENAL GLANDS: Normal.    KIDNEYS/BLADDER: Mild bilateral pelvocaliectasis is new compared to prior studies. 2 mm calcification right upper pole is new. No ureteral calculus. The bladder is negative.    BOWEL: Diverticulosis of the colon. No acute inflammatory change. No obstruction. Normal appendix.    LYMPH NODES: No lymphadenopathy.    VASCULATURE: Aortic ectasia with moderate aortoiliac atherosclerosis. No aneurysm.    PELVIC ORGANS: Normal.    MUSCULOSKELETAL: Generalized demineralization and rightward lumbar scoliosis redemonstrated. Stable chronic T12 compression deformity. No suspicious osseous lesions.      Impression    IMPRESSION:  1.  New patchy consolidative airspace opacities within the lung bases, greater on the left, compatible with pneumonia.  2.  Increase in clustered nodular opacities right upper lobe may be infectious/inflammatory. Short interval follow-up CT in 3 months could be performed to reevaluate.  3.  Mild bilateral pelvocaliectasis new compared to prior studies. New 2 mm nonobstructing calculus right upper pole. No ureteral calculus. Consider short interval follow-up renal ultrasound with pre-/post void bladder.     US Lower Extremity Venous Duplex Left    Narrative    EXAM: US LOWER EXTREMITY VENOUS DUPLEX LEFT  LOCATION: [facility]  DATE: 6/18/2023    INDICATION: Left leg pain. Left foot pain.  COMPARISON: None.  TECHNIQUE: Venous Duplex ultrasound of the left lower extremity with and without compression, augmentation and duplex. Color flow and spectral Doppler with waveform analysis performed.    FINDINGS: Exam includes the common femoral, femoral, popliteal, and contralateral common femoral veins as well as segmentally visualized deep calf veins and greater saphenous vein.     LEFT: No deep vein thrombosis. No superficial thrombophlebitis. No popliteal cyst. There is a subcutaneous collection of fluid in the anterior left foot along the area of pain measuring 4.3 x 0.6 x 2.4 cm.      Impression    IMPRESSION:  1.  No deep venous thrombosis in the left lower extremity.  2.  Subcutaneous fluid collection in the anterior left foot along the area of pain measuring 4.3 x 0.6 x 2.4 cm. This is nonspecific and could represent a seroma or hematoma. Infection can be considered if there are related symptoms.     MRA Brain ([place]) wo Contrast    Narrative    EXAM: MR BRAIN W/O CONTRAST, MRA BRAIN ([place]) W/O CONTRAST  LOCATION: [facility]  DATE: 6/18/2023    INDICATION: History of breast cancer, presented with significant episodes of confusion, visual hallucination.  No focal motor deficits.  Leukocytosis.  COMPARISON: 06/17/2023  TECHNIQUE:   1) Routine multiplanar multisequence head MRI without intravenous contrast.  2) 3D time-of-flight head MRA without intravenous contrast.    FINDINGS:  HEAD MRI: Evaluation for metastatic disease is limited in the absence of contrast.  INTRACRANIAL CONTENTS: No acute or subacute infarct. No mass, acute hemorrhage, or extra-axial fluid collections. Patchy nonspecific T2/FLAIR hyperintensities within the cerebral white matter and jose most consistent with mild to moderate chronic   microvascular ischemic change. There is chronic cortical  infarct of the medial left temporal and occipital lobes. Mild generalized cerebral atrophy. No hydrocephalus. Normal position of the cerebellar tonsils.     SELLA: No abnormality accounting for technique.    OSSEOUS STRUCTURES/SOFT TISSUES: Normal marrow signal. The major intracranial vascular flow voids are maintained.     ORBITS: Prior bilateral cataract surgery. Visualized portions of the orbits are otherwise unremarkable.     SINUSES/MASTOIDS: There is moderate mucosal thickening of left sphenoid sinus. No middle ear or mastoid effusion.     HEAD MRA:   ANTERIOR CIRCULATION: No stenosis/occlusion, aneurysm, or high flow vascular malformation. Standard [place] anatomy.    POSTERIOR CIRCULATION: There is gradual tapering and eventual occlusion of the distal left posterior cerebral artery. The right posterior cerebral artery is widely patent. No aneurysm, or high flow vascular malformation. Balanced vertebral arteries   supply a normal basilar artery.       Impression    IMPRESSION:  HEAD MRI:   1.  No acute intracranial process.  2.  Generalized brain atrophy and presumed microvascular ischemic changes as detailed above.  3.  Chronic left PCA distribution cortical infarct.  4.  Left sphenoid sinusitis.    HEAD MRA:   1.  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  2.  Otherwise unremarkable MRI without intravenous.   MR Brain w/o Contrast    Narrative    EXAM: MR BRAIN W/O CONTRAST, MRA BRAIN ([place]) W/O CONTRAST  LOCATION: [facility]  DATE: 6/18/2023    INDICATION: History of breast cancer, presented with significant episodes of confusion, visual hallucination.  No focal motor deficits.  Leukocytosis.  COMPARISON: 06/17/2023  TECHNIQUE:   1) Routine multiplanar multisequence head MRI without intravenous contrast.  2) 3D time-of-flight head MRA without intravenous contrast.    FINDINGS:  HEAD MRI: Evaluation for metastatic disease is  limited in the absence of contrast.  INTRACRANIAL CONTENTS: No acute or subacute infarct. No mass, acute hemorrhage, or extra-axial fluid collections. Patchy nonspecific T2/FLAIR hyperintensities within the cerebral white matter and jose most consistent with mild to moderate chronic   microvascular ischemic change. There is chronic cortical infarct of the medial left temporal and occipital lobes. Mild generalized cerebral atrophy. No hydrocephalus. Normal position of the cerebellar tonsils.     SELLA: No abnormality accounting for technique.    OSSEOUS STRUCTURES/SOFT TISSUES: Normal marrow signal. The major intracranial vascular flow voids are maintained.     ORBITS: Prior bilateral cataract surgery. Visualized portions of the orbits are otherwise unremarkable.     SINUSES/MASTOIDS: There is moderate mucosal thickening of left sphenoid sinus. No middle ear or mastoid effusion.     HEAD MRA:   ANTERIOR CIRCULATION: No stenosis/occlusion, aneurysm, or high flow vascular malformation. Standard [place] anatomy.    POSTERIOR CIRCULATION: There is gradual tapering and eventual occlusion of the distal left posterior cerebral artery. The right posterior cerebral artery is widely patent. No aneurysm, or high flow vascular malformation. Balanced vertebral arteries   supply a normal basilar artery.       Impression    IMPRESSION:  HEAD MRI:   1.  No acute intracranial process.  2.  Generalized brain atrophy and presumed microvascular ischemic changes as detailed above.  3.  Chronic left PCA distribution cortical infarct.  4.  Left sphenoid sinusitis.    HEAD MRA:   1.  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  2.  Otherwise unremarkable MRI without intravenous.   CT Foot Left w/o Contrast    Narrative    EXAM: CT FOOT LEFT W/O CONTRAST  LOCATION: [facility]  DATE: 6/19/2023    INDICATION: Leukocytosis, small fluid collection on the dorsal aspect of the left  foot with significant tenderness to palpation. Unclear if there is any trauma.  COMPARISON: 3/9/2021.  TECHNIQUE: Noncontrast. Axial, sagittal and coronal thin-section reconstruction. Dose reduction techniques were used.     FINDINGS: Bones are demineralized. There is a nonspecific area of increased soft tissue along the dorsal aspect of the foot measuring 1.6 x 0.6 cm at the level of the TMT joints. This measures up to 4.0 cm in length. This is not well characterized on   noncontrast CT.    No evidence of an acute displaced fracture.    Muscle bulk is maintained. There is no high-grade joint space narrowing or significant marginal osteophyte formation.       Impression    IMPRESSION:  1.  Nonspecific dorsal subcutaneous soft tissue mass along the patella measuring up to 4.0 cm in length. The imaging findings are nonspecific and can be seen with a complex cyst, hematoma/abscess or mass. If further imaging is needed, recommend MRI.    2.  No underlying acute displaced fracture of the foot.    3.  Bone demineralization.     MR Foot Left w/o Contrast    Narrative    EXAM: MR FOOT LEFT W/O CONTRAST  LOCATION: [facility]  DATE: 6/19/2023    INDICATION: History of breast cancer, persistent leukocytosis, tenderness to palpation of the dorsal aspect of the left foot. Imaging showed small mass. Further evaluation for any signs of infection or tumor.  COMPARISON: CT 6/19/2023.  TECHNIQUE: Unenhanced.    FINDINGS:     JOINTS AND BONES:   -Mild degenerative changes most marked at the 1st MTP joint.    TENDONS:   -No tendon tear, tendinopathy, or tenosynovitis.     LIGAMENTS:   -Lisfranc ligament: Intact. No subluxation.    MUSCLES AND SOFT TISSUES:   -In the dorsal aspect of the mid and forefoot regions there is a well-circumscribed serpiginous area of abnormal signal which measures approximately 1.5 x 0.8 x 4.0 cm which is of primarily decreased signal on both T1 and T2-weighted images. This    correlates with the same abnormality seen on today's CT examination. This should be atypical for changes of infection or neoplasm. If this area of abnormal soft tissue has been present for a significant period of time, then I would favor changes of a   benign entity such as a fibroma or neuroma. If this is relatively acute in etiology then changes such as superficial vein thrombosis should be considered.      Impression    IMPRESSION:  1.  I would not favor changes of infection or neoplasm as the etiologies for the area of abnormal serpiginous increased soft tissue in the dorsal aspect of the foot.    2.  If the soft tissue is chronic in nature then I would favor benign entities such as a fibroma or neuroma.    3.  If the soft tissue is acute in nature, then I would favor changes such as superficial thrombophlebitis.   MR Lumbar Spine w/o & w Contrast    Narrative    EXAM: MR LUMBAR SPINE W/O and W CONTRAST  LOCATION: [facility]  DATE: 6/19/2023    INDICATION: Persistent leukocytosis.  History of breast cancer.  Lower back pain for the last 6 months  COMPARISON:  CT chest 07/06/2022.  CONTRAST: 4.5 mL Gadavist  TECHNIQUE: Routine Lumbar Spine MRI without and with IV contrast.    FINDINGS:   Nomenclature is based on 5 lumbar type vertebral bodies with L5-S1 defined on image 9 of series 8. 31 degrees lumbar dextrocurvature with the apex at L2-L3. Minimal degenerative anterolisthesis of L5 on S1.  Chronic T12 wedge compression deformity with   approximately 75% vertebral body height loss anteriorly. Minimal retropulsion of the T12 posterior inferior endplate, unchanged.  No suspicious marrow signal abnormality. Normal distal spinal cord and cauda equina with conus medullaris at L2-L3. No   abnormal intrathecal enhancement.     Left iliopsoas atrophy. Dorsal paraspinal muscular atrophy, right greater than left. Abdominal aortic ectasia. Incidental bilateral renal cysts. Left hip  arthroplasty.    T12-L1: Osseous retropulsion. No significant disc herniation. Mild bilateral facet arthropathy. No significant canal or foraminal stenosis.     L1-L2: Mild bulge. Severe left eccentric disc height loss. Mild bilateral facet arthropathy. No significant canal or foraminal stenosis.    L2-L3: Mild bulge. Severe left eccentric disc height loss. Mild bilateral facet arthropathy. No significant canal or foraminal stenosis.     L3-L4: Mild bulge. Moderate disc height loss. Mild bilateral facet arthropathy. No significant canal or foraminal stenosis.    L4-L5: Bulge with superimposed right foraminal protrusion. Moderate right eccentric disc height loss. Moderate right and mild left facet arthropathy. Severe right lateral recess stenosis. No significant canal or foraminal stenosis.    L5-S1: Anterolisthesis with unroofing of the disc. No significant disc herniation. Moderate disc height loss. Mild to moderate bilateral facet arthropathy. No significant canal or foraminal stenosis.      Impression    IMPRESSION:  1.  No lumbar spine findings to explain the patient's leukocytosis.  2.  Multilevel degenerative changes without high-grade canal or foraminal stenosis.  3.  Severe right L4-L5 lateral recess stenosis. Correlate for right L5 radiculopathy.   XR Ribs Left Port 2 Views    Narrative    EXAM: XR RIBS LEFT PORT 2 VIEWS  LOCATION: [facility]  DATE: 6/21/2023    INDICATION: Left back pain.  COMPARISON: 06/17/2023, 04/10/2022      Impression    IMPRESSION: No acute rib fracture seen. Left mid chest scarring. Left inferior chest worsening opacity which could represent atelectasis or pneumonia.    Stable heart size.       Patient Active Problem List   Diagnosis     Left hip pain     Nonischemic cardiomyopathy (H)     COPD (chronic obstructive pulmonary disease) (H)     Benign essential hypertension     Chronic kidney disease, stage IV (severe) (H)     Factor 5 Leiden mutation,  heterozygous (H)     Senile osteoporosis     COPD exacerbation (H)     Community acquired pneumonia of left upper lobe of lung     Acute on chronic diastolic congestive heart failure (H)     Atrial fibrillation with rapid ventricular response (H)     Weight loss     Acute on chronic respiratory failure with hypoxia (H)     Prolonged Q-T interval on ECG     Neutrophilia     Abscess of upper lobe of left lung with pneumonia (H)     Invasive ductal carcinoma of breast, female, left (H)     ACP (advance care planning)     Anisocoria     At high risk for impaired skin integrity     Trochanteric bursitis     Chronic anticoagulation     Chronic systolic congestive heart failure (H)     Closed fracture of hip with delayed healing     Compression fracture of L1 vertebra, sequela     Depression with anxiety     Dyslipidemia     Dyspnea     Exacerbation of intermittent asthma, unspecified asthma severity     Family history of blood disease     Fibrocystic breast     Fissure in skin of foot     Fracture of femur (H)     Gait disorder     GERD (gastroesophageal reflux disease)     Herniated intervertebral disc     Groin pain     Herpes zoster without complication     High frequency sensorineural hearing loss of left ear     Patient's other noncompliance with medication regimen     Hot flashes due to tamoxifen     Hot flashes, menopausal     Hyperlipidemia     Hypoxia     Idiopathic edema     Insomnia     Malignant neoplasm of central portion of left female breast (H)     Metabolic encephalopathy     Unspecified asthma, uncomplicated     Muscle weakness (generalized)     OAB (overactive bladder)     Osteoarthritis of hip     Osteoporosis     Other abnormalities of gait and mobility     Other chronic sinusitis     Pain due to fracture     Personal history of malignant neoplasm of breast     Personal history of transient ischemic attack (TIA), and cerebral infarction without residual deficits     Physical deconditioning      Piriformis syndrome     Post menopausal syndrome     Pulsatile tinnitus of both ears     S/P hip replacement, left     Sacro-iliac pain     Sacral insufficiency fracture     T12 compression fracture (H)     Tendonitis     Urge incontinence     Urinary incontinence     Wedge compression fracture of t11-T12 vertebra, subsequent encounter for fracture with routine healing     Heart failure with reduced ejection fraction (H)     Leukocytosis, unspecified type     Acute pneumonia         Surgical History  She  has a past surgical history that includes IR Abdominal Aortogram (4/16/2003); IR Miscellaneous Procedure (4/16/2003); IR Aortic Arch 4 Vessel Angiogram (4/16/2003); Arthroplasty revision hip (Left); OPEN TX FEMORAL FRACTURE DISTAL MED/LAT CONDYLE (Left, 10/28/2015); Cardiac catheterization; Cataract Extraction (Left, 07/18/2017); Biopsy breast (Right, 2017); Bladder surgery; Lumpectomy breast (Left, 06/2017); PICC/Midline Placement (4/7/2022); and PICC/Midline Placement (4/11/2022).    Family History  Reviewed, and family history includes Breast Cancer in her maternal aunt; Osteoporosis in an other family member; Prostate Cancer in her brother and maternal uncle.    Social History  Reviewed, and she  reports that she quit smoking about 15 years ago. Her smoking use included cigarettes. She quit smokeless tobacco use about 18 years ago. She reports that she does not drink alcohol and does not use drugs.    Allergies  Allergies   Allergen Reactions     Sulfa (Sulfonamide Antibiotics) [Sulfa Antibiotics] Rash     Headaches and dizziness.     Homeopathic Drugs [External Allergen Needs Reconciliation - See Comment] Unknown and Other (See Comments)     Comment: runny nose, Comment: runny nose     Mold Extracts [Molds & Smuts] Unknown     Morphine (Pf) [Morphine] Unknown     hallucinate     Lisinopril Cough, Unknown and Itching     Comment: cough, Comment: cough     Sulfacetamide Sodium [Sulfacetamide] Rash             Michelle Nettles,   Pulmonary and Critical Care Attending  pgr [phone]    Securely message with the Vocera Web Console (learn more here)

## 2023-06-22 NOTE — CONSULTS
Care Management Follow Up    Length of Stay (days): 5    Expected Discharge Date: 06/23/2023     Concerns to be Addressed: not medically ready per MD     Patient plan of care discussed at interdisciplinary rounds: Yes    Anticipated Discharge Disposition: Home, Home Care (return home with added HC services for PT/OT)  Disposition Comments: Anticipate D/C home with added [facility] services for PT/OT. Daughter will transport (and bring portable 02).  Anticipated Discharge Services:  (return home with added HC services for PT/OT)  Anticipated Discharge DME:  (nothing new- was on home 02 prior to admit)    Patient/family educated on Medicare website which has current facility and service quality ratings: yes  Education Provided on the Discharge Plan: Yes (Patient/daughter Leelee confirm plan is to return home with added HC services for PT/OT. AVS per bedside RN.)  Patient/Family in Agreement with the Plan: yes (patient and daughter Leelee)    Referrals Placed by CM/SW: Homecare  Private pay costs discussed: None indicated at this time.    Additional Information:  Chart reviewed.     PT recommendations for home with assist; Home PT. OT recommendations for home with assist; home OT.     CM spoke with patient and daughter Leelee today per patient request. CM secured [facility] for PT/OT. Daughter is able to assist at home. Daughter can be called to coordinate transportation at hospital discharge (family will bring portable 02).       Kimmie Hillman RN

## 2023-06-22 NOTE — PROGRESS NOTES
[facility] Pulmonary/Critical Care Consult Team Note    Irene León,  1945, MRN [id number]  Admitting Dx: COPD exacerbation (H) [J44.1]  Leukocytosis, unspecified type [D72.829]  Acute pneumonia [J18.9]  Date / Time of Admission:  2023 11:56 AM    Recent Events:  Intake/Output last 3 shifts:  I/O last 3 completed shifts:  In: 738 [P.O.:520; I.V.:218]  Out: 650 [Urine:650]  She slept well last night  Still very winded going to the bathroom etc  Minimal cough  She is on 3-5L NC    Assessment/Plan: Irene León is a 78 year old female with PMHx of HTN, Breast CA s/p radiation, COPD on home O2 2L at rest and 4 with exertion who presents with confusion and new patchy infiltrate on CT Chest.     Left Sided PNA  - agree with Abx  - no strong indication for steroids unless the pt starts to wheeze or is not clinically improving    Right lung nodule  - she will reach out to her Pulmonologist (has an appointment already in July)  - see if they want a PET or anything else prior to her visit.  - Will also refer her to our lung clinic (she would like a second opinion)  - I have notified our clinic and placed order    COPD on home O2  - will refer to pulmonary rehab to help her exercise capacity.    Medical Care Time excluding procedures and family discussions greater than: 45min    Risk Factors Present on Admission:  Clinically Significant Risk Factors              # Hypoalbuminemia: Lowest albumin = 3.2 g/dL at 2023 12:17 PM, will monitor as appropriate     # Hypertension: Noted on problem list                        Michelle Nettles DO  Pulmonary and Critical Care Attending  pgr [phone]    Allergies   Allergen Reactions     Sulfa (Sulfonamide Antibiotics) [Sulfa Antibiotics] Rash     Headaches and dizziness.     Homeopathic Drugs [External Allergen Needs Reconciliation - See Comment] Unknown and Other (See Comments)     Comment: runny nose, Comment: runny nose     Mold Extracts [Molds & Smuts]  "Unknown     Morphine (Pf) [Morphine] Unknown     hallucinate     Lisinopril Cough, Unknown and Itching     Comment: cough, Comment: cough     Sulfacetamide Sodium [Sulfacetamide] Rash       Meds: See MAR    Physical Exam:  /72 (BP Location: Right arm, Cuff Size: Adult Regular)   Pulse 78   Temp 97.2  F (36.2  C) (Oral)   Resp 18   Ht 1.499 m (4' 11\")   Wt 53 kg (116 lb 12.8 oz)   SpO2 97%   BMI 23.59 kg/m    Intake/Output this shift:  I/O this shift:  In: -   Out: 250 [Urine:250]  GEN: sitting up in a chair, NAD  HEENT: MMM, NC in place  CVS: regular rhythm, no murmurs  RESP: Distant breath sounds, faint crackles, no wheezing  ABD: Soft, No abdominal pain with palpation, no guarding, no rigidity  EXT: Warm, well perfused, no edema  NEURO: moving all extremities, nonfocal  PSYCH: pleasant    Pertinent Labs: Latest lab results in EHR personally reviewed.   CMP  Recent Labs   Lab 06/22/23  0742 06/21/23  0736 06/20/23  2344 06/20/23  1319 06/20/23  0648 06/19/23  1928 06/19/23  1030 06/18/23  0711 06/17/23  1217    142  --   --  140  --  141   < > 136   POTASSIUM 4.0 3.8 3.7 3.4* 3.1*   < > 3.0*   < > 3.9   CHLORIDE 106 108*  --   --  104  --  101   < > 94*   CO2 29 26  --   --  28  --  25   < > 29   ANIONGAP 6 8  --   --  8  --  15   < > 13   GLC 84 83  --   --  118  --  151*   < > 93   BUN 19 20  --   --  27  --  28   < > 21   CR 1.35* 1.37*  --   --  1.29*  --  1.63*   < > 1.94*   GFRESTIMATED 40* 39*  --   --  42*  --  32*   < > 26*   MESSI 9.2 8.3*  --   --  8.0*  --  9.0   < > 9.8   MAG 2.0 2.0  --  2.0 2.1  --  2.2   < > 2.1   PROTTOTAL  --   --   --   --   --   --   --   --  7.7   ALBUMIN  --   --   --   --   --   --   --   --  3.2*   BILITOTAL  --   --   --   --   --   --   --   --  0.9   ALKPHOS  --   --   --   --   --   --   --   --  71   AST  --   --   --   --   --   --   --   --  23   ALT  --   --   --   --   --   --   --   --  10    < > = values in this interval not displayed. "     CBC  Recent Labs   Lab 06/21/23  0736 06/20/23  0648 06/19/23  1029 06/18/23  1018 06/17/23  1217   WBC  --  10.6 25.8* 25.1* 26.4*   RBC  --  3.83 4.87 4.87 4.82   HGB 12.3 10.7* 13.7 13.6 13.7   HCT  --  34.1* 43.5 42.2 42.7   MCV  --  89 89 87 89   MCH  --  27.9 28.1 27.9 28.4   MCHC  --  31.4* 31.5 32.2 32.1   RDW  --  14.7 14.8 14.4 14.3   PLT  --  223 280 250 210     INR  Recent Labs   Lab 06/17/23  1217   INR 1.28*     Arterial Blood GasNo lab results found in last 7 days.    Cultures: not yet available.    Imaging: personally reviewed.   Results for orders placed or performed during the hospital encounter of 06/17/23   Head CT w/o contrast    Narrative    EXAM: CT HEAD W/O CONTRAST  LOCATION: [facility]  DATE: 6/17/2023    INDICATION: ams  COMPARISON: 03/23/2021  TECHNIQUE: Routine CT Head without IV contrast. Multiplanar reformats. Dose reduction techniques were used.    FINDINGS:  INTRACRANIAL CONTENTS: No intracranial hemorrhage, extraaxial collection, or mass effect.  No CT evidence of acute infarct. Moderate presumed chronic small vessel ischemic changes. Similar chronic cortical infarct of the inferior left temporal occipital   lobe. Intracranial atherosclerosis is present. Mild to moderate generalized volume loss. No hydrocephalus.     VISUALIZED ORBITS/SINUSES/MASTOIDS: No intraorbital abnormality. No paranasal sinus mucosal disease. No middle ear or mastoid effusion.    BONES/SOFT TISSUES: No acute abnormality.      Impression    IMPRESSION:  1.  No CT evidence for acute intracranial process.  2.  Brain atrophy and presumed chronic microvascular ischemic changes as above.  3.  Chronic left temporal occipital infarct, similar prior.   Chest XR,  PA & LAT    Narrative    EXAM: XR CHEST 2 VIEWS  LOCATION: [facility]  DATE: 6/17/2023    INDICATION: Hypoxia.  COMPARISON: 04/10/2022      Impression    IMPRESSION: Mild bandlike opacities in the left  upper lung are likely due to scarring from the more extensive infiltrates and consolidation in this location on 04/10/2022. Additional patchy interstitial opacities throughout both lungs are likely due to   scarring. No definite new infiltrates. Normal heart size and pulmonary vascularity. Old healed bilateral rib fractures. No significant pleural effusions.   CT Chest w/o Contrast    Narrative    EXAM: CT CHEST W/O CONTRAST, CT ABDOMEN PELVIS W/O CONTRAST  LOCATION: [facility]  DATE: 6/17/2023    INDICATION: Shortness of breath, confusion and leukocytosis. History of COPD. Increased opacities left lung on chest radiograph. Sepsis and acute kidney injury.  COMPARISON: 2 view chest 06/17/2023, CT chest 04/10/2023, CT pelvis 10/11/2022, CT chest abdomen pelvis 04/08/2022  TECHNIQUE: CT scan of the chest, abdomen, and pelvis was performed without IV contrast. Multiplanar reformats were obtained. Dose reduction techniques were used.   CONTRAST: None.    FINDINGS:   LUNGS AND PLEURA: Advanced emphysema redemonstrated, scattered subpleural scarring and mild bronchial wall thickening redemonstrated. 12 x 7 mm nodular opacity right upper lobe image 95 series 4 previously measured 10 x 6 mm. Adjacent cluster of new   nodules, largest measuring measure 8 mm image 114.    New patchy consolidative airspace opacities within the lung bases, greater on the left, compatible with pneumonia. Band of scarring bronchiectasis left upper lobe along the major fissure unchanged. Central airways are patent.    MEDIASTINUM/AXILLAE: No mass/adenopathy nor pericardial effusion. Mild cardiomegaly, overall decreased    CORONARY ARTERY CALCIFICATION: Moderate.    HEPATOBILIARY: Normal.    PANCREAS: Normal.    SPLEEN: Normal.    ADRENAL GLANDS: Normal.    KIDNEYS/BLADDER: Mild bilateral pelvocaliectasis is new compared to prior studies. 2 mm calcification right upper pole is new. No ureteral calculus. The bladder is  negative.    BOWEL: Diverticulosis of the colon. No acute inflammatory change. No obstruction. Normal appendix.    LYMPH NODES: No lymphadenopathy.    VASCULATURE: Aortic ectasia with moderate aortoiliac atherosclerosis. No aneurysm.    PELVIC ORGANS: Normal.    MUSCULOSKELETAL: Generalized demineralization and rightward lumbar scoliosis redemonstrated. Stable chronic T12 compression deformity. No suspicious osseous lesions.      Impression    IMPRESSION:  1.  New patchy consolidative airspace opacities within the lung bases, greater on the left, compatible with pneumonia.  2.  Increase in clustered nodular opacities right upper lobe may be infectious/inflammatory. Short interval follow-up CT in 3 months could be performed to reevaluate.  3.  Mild bilateral pelvocaliectasis new compared to prior studies. New 2 mm nonobstructing calculus right upper pole. No ureteral calculus. Consider short interval follow-up renal ultrasound with pre-/post void bladder.     CT Abdomen Pelvis w/o Contrast    Narrative    EXAM: CT CHEST W/O CONTRAST, CT ABDOMEN PELVIS W/O CONTRAST  LOCATION: [facility]  DATE: 6/17/2023    INDICATION: Shortness of breath, confusion and leukocytosis. History of COPD. Increased opacities left lung on chest radiograph. Sepsis and acute kidney injury.  COMPARISON: 2 view chest 06/17/2023, CT chest 04/10/2023, CT pelvis 10/11/2022, CT chest abdomen pelvis 04/08/2022  TECHNIQUE: CT scan of the chest, abdomen, and pelvis was performed without IV contrast. Multiplanar reformats were obtained. Dose reduction techniques were used.   CONTRAST: None.    FINDINGS:   LUNGS AND PLEURA: Advanced emphysema redemonstrated, scattered subpleural scarring and mild bronchial wall thickening redemonstrated. 12 x 7 mm nodular opacity right upper lobe image 95 series 4 previously measured 10 x 6 mm. Adjacent cluster of new   nodules, largest measuring measure 8 mm image 114.    New patchy  consolidative airspace opacities within the lung bases, greater on the left, compatible with pneumonia. Band of scarring bronchiectasis left upper lobe along the major fissure unchanged. Central airways are patent.    MEDIASTINUM/AXILLAE: No mass/adenopathy nor pericardial effusion. Mild cardiomegaly, overall decreased    CORONARY ARTERY CALCIFICATION: Moderate.    HEPATOBILIARY: Normal.    PANCREAS: Normal.    SPLEEN: Normal.    ADRENAL GLANDS: Normal.    KIDNEYS/BLADDER: Mild bilateral pelvocaliectasis is new compared to prior studies. 2 mm calcification right upper pole is new. No ureteral calculus. The bladder is negative.    BOWEL: Diverticulosis of the colon. No acute inflammatory change. No obstruction. Normal appendix.    LYMPH NODES: No lymphadenopathy.    VASCULATURE: Aortic ectasia with moderate aortoiliac atherosclerosis. No aneurysm.    PELVIC ORGANS: Normal.    MUSCULOSKELETAL: Generalized demineralization and rightward lumbar scoliosis redemonstrated. Stable chronic T12 compression deformity. No suspicious osseous lesions.      Impression    IMPRESSION:  1.  New patchy consolidative airspace opacities within the lung bases, greater on the left, compatible with pneumonia.  2.  Increase in clustered nodular opacities right upper lobe may be infectious/inflammatory. Short interval follow-up CT in 3 months could be performed to reevaluate.  3.  Mild bilateral pelvocaliectasis new compared to prior studies. New 2 mm nonobstructing calculus right upper pole. No ureteral calculus. Consider short interval follow-up renal ultrasound with pre-/post void bladder.     US Lower Extremity Venous Duplex Left    Narrative    EXAM: US LOWER EXTREMITY VENOUS DUPLEX LEFT  LOCATION: [facility]  DATE: 6/18/2023    INDICATION: Left leg pain. Left foot pain.  COMPARISON: None.  TECHNIQUE: Venous Duplex ultrasound of the left lower extremity with and without compression, augmentation and duplex.  Color flow and spectral Doppler with waveform analysis performed.    FINDINGS: Exam includes the common femoral, femoral, popliteal, and contralateral common femoral veins as well as segmentally visualized deep calf veins and greater saphenous vein.     LEFT: No deep vein thrombosis. No superficial thrombophlebitis. No popliteal cyst. There is a subcutaneous collection of fluid in the anterior left foot along the area of pain measuring 4.3 x 0.6 x 2.4 cm.      Impression    IMPRESSION:  1.  No deep venous thrombosis in the left lower extremity.  2.  Subcutaneous fluid collection in the anterior left foot along the area of pain measuring 4.3 x 0.6 x 2.4 cm. This is nonspecific and could represent a seroma or hematoma. Infection can be considered if there are related symptoms.     MRA Brain ([place]) wo Contrast    Narrative    EXAM: MR BRAIN W/O CONTRAST, MRA BRAIN ([place]) W/O CONTRAST  LOCATION: [facility]  DATE: 6/18/2023    INDICATION: History of breast cancer, presented with significant episodes of confusion, visual hallucination.  No focal motor deficits.  Leukocytosis.  COMPARISON: 06/17/2023  TECHNIQUE:   1) Routine multiplanar multisequence head MRI without intravenous contrast.  2) 3D time-of-flight head MRA without intravenous contrast.    FINDINGS:  HEAD MRI: Evaluation for metastatic disease is limited in the absence of contrast.  INTRACRANIAL CONTENTS: No acute or subacute infarct. No mass, acute hemorrhage, or extra-axial fluid collections. Patchy nonspecific T2/FLAIR hyperintensities within the cerebral white matter and jose most consistent with mild to moderate chronic   microvascular ischemic change. There is chronic cortical infarct of the medial left temporal and occipital lobes. Mild generalized cerebral atrophy. No hydrocephalus. Normal position of the cerebellar tonsils.     SELLA: No abnormality accounting for technique.    OSSEOUS STRUCTURES/SOFT  TISSUES: Normal marrow signal. The major intracranial vascular flow voids are maintained.     ORBITS: Prior bilateral cataract surgery. Visualized portions of the orbits are otherwise unremarkable.     SINUSES/MASTOIDS: There is moderate mucosal thickening of left sphenoid sinus. No middle ear or mastoid effusion.     HEAD MRA:   ANTERIOR CIRCULATION: No stenosis/occlusion, aneurysm, or high flow vascular malformation. Standard [place] anatomy.    POSTERIOR CIRCULATION: There is gradual tapering and eventual occlusion of the distal left posterior cerebral artery. The right posterior cerebral artery is widely patent. No aneurysm, or high flow vascular malformation. Balanced vertebral arteries   supply a normal basilar artery.       Impression    IMPRESSION:  HEAD MRI:   1.  No acute intracranial process.  2.  Generalized brain atrophy and presumed microvascular ischemic changes as detailed above.  3.  Chronic left PCA distribution cortical infarct.  4.  Left sphenoid sinusitis.    HEAD MRA:   1.  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  2.  Otherwise unremarkable MRI without intravenous.   MR Brain w/o Contrast    Narrative    EXAM: MR BRAIN W/O CONTRAST, MRA BRAIN ([place]) W/O CONTRAST  LOCATION: [facility]  DATE: 6/18/2023    INDICATION: History of breast cancer, presented with significant episodes of confusion, visual hallucination.  No focal motor deficits.  Leukocytosis.  COMPARISON: 06/17/2023  TECHNIQUE:   1) Routine multiplanar multisequence head MRI without intravenous contrast.  2) 3D time-of-flight head MRA without intravenous contrast.    FINDINGS:  HEAD MRI: Evaluation for metastatic disease is limited in the absence of contrast.  INTRACRANIAL CONTENTS: No acute or subacute infarct. No mass, acute hemorrhage, or extra-axial fluid collections. Patchy nonspecific T2/FLAIR hyperintensities within the cerebral white matter and  jose most consistent with mild to moderate chronic   microvascular ischemic change. There is chronic cortical infarct of the medial left temporal and occipital lobes. Mild generalized cerebral atrophy. No hydrocephalus. Normal position of the cerebellar tonsils.     SELLA: No abnormality accounting for technique.    OSSEOUS STRUCTURES/SOFT TISSUES: Normal marrow signal. The major intracranial vascular flow voids are maintained.     ORBITS: Prior bilateral cataract surgery. Visualized portions of the orbits are otherwise unremarkable.     SINUSES/MASTOIDS: There is moderate mucosal thickening of left sphenoid sinus. No middle ear or mastoid effusion.     HEAD MRA:   ANTERIOR CIRCULATION: No stenosis/occlusion, aneurysm, or high flow vascular malformation. Standard [place] anatomy.    POSTERIOR CIRCULATION: There is gradual tapering and eventual occlusion of the distal left posterior cerebral artery. The right posterior cerebral artery is widely patent. No aneurysm, or high flow vascular malformation. Balanced vertebral arteries   supply a normal basilar artery.       Impression    IMPRESSION:  HEAD MRI:   1.  No acute intracranial process.  2.  Generalized brain atrophy and presumed microvascular ischemic changes as detailed above.  3.  Chronic left PCA distribution cortical infarct.  4.  Left sphenoid sinusitis.    HEAD MRA:   1.  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  2.  Otherwise unremarkable MRI without intravenous.   CT Foot Left w/o Contrast    Narrative    EXAM: CT FOOT LEFT W/O CONTRAST  LOCATION: [facility]  DATE: 6/19/2023    INDICATION: Leukocytosis, small fluid collection on the dorsal aspect of the left foot with significant tenderness to palpation. Unclear if there is any trauma.  COMPARISON: 3/9/2021.  TECHNIQUE: Noncontrast. Axial, sagittal and coronal thin-section reconstruction. Dose reduction techniques were used.      FINDINGS: Bones are demineralized. There is a nonspecific area of increased soft tissue along the dorsal aspect of the foot measuring 1.6 x 0.6 cm at the level of the TMT joints. This measures up to 4.0 cm in length. This is not well characterized on   noncontrast CT.    No evidence of an acute displaced fracture.    Muscle bulk is maintained. There is no high-grade joint space narrowing or significant marginal osteophyte formation.       Impression    IMPRESSION:  1.  Nonspecific dorsal subcutaneous soft tissue mass along the patella measuring up to 4.0 cm in length. The imaging findings are nonspecific and can be seen with a complex cyst, hematoma/abscess or mass. If further imaging is needed, recommend MRI.    2.  No underlying acute displaced fracture of the foot.    3.  Bone demineralization.     MR Foot Left w/o Contrast    Narrative    EXAM: MR FOOT LEFT W/O CONTRAST  LOCATION: [facility]  DATE: 6/19/2023    INDICATION: History of breast cancer, persistent leukocytosis, tenderness to palpation of the dorsal aspect of the left foot. Imaging showed small mass. Further evaluation for any signs of infection or tumor.  COMPARISON: CT 6/19/2023.  TECHNIQUE: Unenhanced.    FINDINGS:     JOINTS AND BONES:   -Mild degenerative changes most marked at the 1st MTP joint.    TENDONS:   -No tendon tear, tendinopathy, or tenosynovitis.     LIGAMENTS:   -Lisfranc ligament: Intact. No subluxation.    MUSCLES AND SOFT TISSUES:   -In the dorsal aspect of the mid and forefoot regions there is a well-circumscribed serpiginous area of abnormal signal which measures approximately 1.5 x 0.8 x 4.0 cm which is of primarily decreased signal on both T1 and T2-weighted images. This   correlates with the same abnormality seen on today's CT examination. This should be atypical for changes of infection or neoplasm. If this area of abnormal soft tissue has been present for a significant period of time, then I  would favor changes of a   benign entity such as a fibroma or neuroma. If this is relatively acute in etiology then changes such as superficial vein thrombosis should be considered.      Impression    IMPRESSION:  1.  I would not favor changes of infection or neoplasm as the etiologies for the area of abnormal serpiginous increased soft tissue in the dorsal aspect of the foot.    2.  If the soft tissue is chronic in nature then I would favor benign entities such as a fibroma or neuroma.    3.  If the soft tissue is acute in nature, then I would favor changes such as superficial thrombophlebitis.   MR Lumbar Spine w/o & w Contrast    Narrative    EXAM: MR LUMBAR SPINE W/O and W CONTRAST  LOCATION: [facility]  DATE: 6/19/2023    INDICATION: Persistent leukocytosis.  History of breast cancer.  Lower back pain for the last 6 months  COMPARISON:  CT chest 07/06/2022.  CONTRAST: 4.5 mL Gadavist  TECHNIQUE: Routine Lumbar Spine MRI without and with IV contrast.    FINDINGS:   Nomenclature is based on 5 lumbar type vertebral bodies with L5-S1 defined on image 9 of series 8. 31 degrees lumbar dextrocurvature with the apex at L2-L3. Minimal degenerative anterolisthesis of L5 on S1.  Chronic T12 wedge compression deformity with   approximately 75% vertebral body height loss anteriorly. Minimal retropulsion of the T12 posterior inferior endplate, unchanged.  No suspicious marrow signal abnormality. Normal distal spinal cord and cauda equina with conus medullaris at L2-L3. No   abnormal intrathecal enhancement.     Left iliopsoas atrophy. Dorsal paraspinal muscular atrophy, right greater than left. Abdominal aortic ectasia. Incidental bilateral renal cysts. Left hip arthroplasty.    T12-L1: Osseous retropulsion. No significant disc herniation. Mild bilateral facet arthropathy. No significant canal or foraminal stenosis.     L1-L2: Mild bulge. Severe left eccentric disc height loss. Mild bilateral facet  arthropathy. No significant canal or foraminal stenosis.    L2-L3: Mild bulge. Severe left eccentric disc height loss. Mild bilateral facet arthropathy. No significant canal or foraminal stenosis.     L3-L4: Mild bulge. Moderate disc height loss. Mild bilateral facet arthropathy. No significant canal or foraminal stenosis.    L4-L5: Bulge with superimposed right foraminal protrusion. Moderate right eccentric disc height loss. Moderate right and mild left facet arthropathy. Severe right lateral recess stenosis. No significant canal or foraminal stenosis.    L5-S1: Anterolisthesis with unroofing of the disc. No significant disc herniation. Moderate disc height loss. Mild to moderate bilateral facet arthropathy. No significant canal or foraminal stenosis.      Impression    IMPRESSION:  1.  No lumbar spine findings to explain the patient's leukocytosis.  2.  Multilevel degenerative changes without high-grade canal or foraminal stenosis.  3.  Severe right L4-L5 lateral recess stenosis. Correlate for right L5 radiculopathy.   XR Ribs Left Port 2 Views    Narrative    EXAM: XR RIBS LEFT PORT 2 VIEWS  LOCATION: [facility]  DATE: 6/21/2023    INDICATION: Left back pain.  COMPARISON: 06/17/2023, 04/10/2022      Impression    IMPRESSION: No acute rib fracture seen. Left mid chest scarring. Left inferior chest worsening opacity which could represent atelectasis or pneumonia.    Stable heart size.       Patient Active Problem List   Diagnosis     Left hip pain     Nonischemic cardiomyopathy (H)     COPD (chronic obstructive pulmonary disease) (H)     Benign essential hypertension     Chronic kidney disease, stage IV (severe) (H)     Factor 5 Leiden mutation, heterozygous (H)     Senile osteoporosis     COPD exacerbation (H)     Community acquired pneumonia of left upper lobe of lung     Acute on chronic diastolic congestive heart failure (H)     Atrial fibrillation with rapid ventricular response (H)      Weight loss     Acute on chronic respiratory failure with hypoxia (H)     Prolonged Q-T interval on ECG     Neutrophilia     Abscess of upper lobe of left lung with pneumonia (H)     Invasive ductal carcinoma of breast, female, left (H)     ACP (advance care planning)     Anisocoria     At high risk for impaired skin integrity     Trochanteric bursitis     Chronic anticoagulation     Chronic systolic congestive heart failure (H)     Closed fracture of hip with delayed healing     Compression fracture of L1 vertebra, sequela     Depression with anxiety     Dyslipidemia     Dyspnea     Exacerbation of intermittent asthma, unspecified asthma severity     Family history of blood disease     Fibrocystic breast     Fissure in skin of foot     Fracture of femur (H)     Gait disorder     GERD (gastroesophageal reflux disease)     Herniated intervertebral disc     Groin pain     Herpes zoster without complication     High frequency sensorineural hearing loss of left ear     Patient's other noncompliance with medication regimen     Hot flashes due to tamoxifen     Hot flashes, menopausal     Hyperlipidemia     Hypoxia     Idiopathic edema     Insomnia     Malignant neoplasm of central portion of left female breast (H)     Metabolic encephalopathy     Unspecified asthma, uncomplicated     Muscle weakness (generalized)     OAB (overactive bladder)     Osteoarthritis of hip     Osteoporosis     Other abnormalities of gait and mobility     Other chronic sinusitis     Pain due to fracture     Personal history of malignant neoplasm of breast     Personal history of transient ischemic attack (TIA), and cerebral infarction without residual deficits     Physical deconditioning     Piriformis syndrome     Post menopausal syndrome     Pulsatile tinnitus of both ears     S/P hip replacement, left     Sacro-iliac pain     Sacral insufficiency fracture     T12 compression fracture (H)     Tendonitis     Urge incontinence     Urinary  incontinence     Wedge compression fracture of t11-T12 vertebra, subsequent encounter for fracture with routine healing     Heart failure with reduced ejection fraction (H)     Leukocytosis, unspecified type     Acute pneumonia         Surgical History  She  has a past surgical history that includes IR Abdominal Aortogram (4/16/2003); IR Miscellaneous Procedure (4/16/2003); IR Aortic Arch 4 Vessel Angiogram (4/16/2003); Arthroplasty revision hip (Left); OPEN TX FEMORAL FRACTURE DISTAL MED/LAT CONDYLE (Left, 10/28/2015); Cardiac catheterization; Cataract Extraction (Left, 07/18/2017); Biopsy breast (Right, 2017); Bladder surgery; Lumpectomy breast (Left, 06/2017); PICC/Midline Placement (4/7/2022); and PICC/Midline Placement (4/11/2022).    Family History  Reviewed, and family history includes Breast Cancer in her maternal aunt; Osteoporosis in an other family member; Prostate Cancer in her brother and maternal uncle.    Social History  Reviewed, and she  reports that she quit smoking about 15 years ago. Her smoking use included cigarettes. She quit smokeless tobacco use about 18 years ago. She reports that she does not drink alcohol and does not use drugs.    Allergies  Allergies   Allergen Reactions     Sulfa (Sulfonamide Antibiotics) [Sulfa Antibiotics] Rash     Headaches and dizziness.     Homeopathic Drugs [External Allergen Needs Reconciliation - See Comment] Unknown and Other (See Comments)     Comment: runny nose, Comment: runny nose     Mold Extracts [Molds & Smuts] Unknown     Morphine (Pf) [Morphine] Unknown     hallucinate     Lisinopril Cough, Unknown and Itching     Comment: cough, Comment: cough     Sulfacetamide Sodium [Sulfacetamide] Rash            Michelle Nettles DO  Pulmonary and Critical Care Attending  pgr [phone]    Securely message with the Vocera Web Console (learn more here)

## 2023-06-22 NOTE — PROGRESS NOTES
SPIRITUAL HEALTH SERVICES Progress Note  WW  [phone]    Visited Stephanie hector MARTÍNEZ, she was great spirits using humor to cope.  She shared she is getting sick of being in the hospital but no major distress.  SHS available per request     Emir Guerrero M.Div., [facility]  Staff    [phone]

## 2023-06-22 NOTE — PLAN OF CARE
"  Problem: Plan of Care - These are the overarching goals to be used throughout the patient stay.    Goal: Plan of Care Review  Description: The Plan of Care Review/Shift note should be completed every shift.  The Outcome Evaluation is a brief statement about your assessment that the patient is improving, declining, or no change.  This information will be displayed automatically on your shift note.  Outcome: Progressing     Problem: Plan of Care - These are the overarching goals to be used throughout the patient stay.    Goal: Patient-Specific Goal (Individualized)  Description: You can add care plan individualizations to a care plan. Examples of Individualization might be:  \"Parent requests to be called daily at 9am for status\", \"I have a hard time hearing out of my right ear\", or \"Do not touch me to wake me up as it startles me\".  Outcome: Progressing   Goal Outcome Evaluation:       VSS on 3L.  Dyspnea on exertion. Productive cough. Echo done and nuc med perfusion done. See notes. Purewick in place. Reg diet tolerating well. Discharge pending                 "

## 2023-06-22 NOTE — PROGRESS NOTES
[facility]    Medicine Progress Note - Hospitalist Service    Date of Admission:  6/17/2023    Assessment & Plan     Irene León is a pleasant 78 year old woman with advanced COPD on 2 to 4 L at home, hypertension, nonischemic cardiomyopathy (prior ejection fraction around 40%), and breast cancer status post radiation who presented to the hospital 6/17/23 after an episode of confusion.  On presentation, she had mild fluid overload with elevated BNP.  Urinalysis was negative for any infection.  CT chest abdomen pelvis showed new patchy infiltrate bibasilarly possibly pneumonia; she was started on vancomycin and Zosyn as the source of infection was unclear.   MRA and MRI brain on 6/18: no acute abnormalities, chronic left PCA distribution cortical infarct.      Current problems include:     1. Acute metabolic encephalopathy       Possible mild cognitive impairment       Episode of visual hallucination; no recurrence.   much improved and likely near her pre-admission baseline (per her daughter).  - per family, had significant confusion, visual hallucination on 6/17.  - OT eval. For cognitive testing - awaiting [facility]     PT and OT recommending home with home care services - D/W care coordinator    2.  Chest and arm pain        Cardiomyopathy  Still concerned about her left side chest pain that intermittently goes down left arm  Serial troponins today  Echo requested and is pending  Rib xrays 6/21/23 - no acute fractures    Topical agents today for pain - does not like lidoderm patches  Aqua K pad  Will try small dose of flexeril prn today with close attention to her mentation    VQ scan today to r/o PE - d dimer testing would not be accurate with ongoing co-morbidities    Addendum - 1600  VQ with no findings consistent with PE  troponins negative  ECHO - new reduction in EF (20-25%) compared to 4/8/22 (45-50%)  Will ask for cardiology to see 6/23/23 for further recommendations    On  metoprolol  All listed to ACEI - start ARB with close monitoring of creat  SBP last at 1500 was 150/67    3. Possible bibasilar pneumonia; improving.  Has been on IV zosyn and IV vancolinically improving     Will transition to po augmentin today 6/22/23  Discontinue vanco as MRSA swab negative    4. COPD with severe disease, oxygen dependent  Chronic Respiratory Failure with hypoxia; stable.  - continue current inhalers, nebs  - add acapella QID w/ nebs  - continue RT    Pulmonary med consulted  No further indication for steroids at this time unless a clinical change (no wheezing)    5. Incidental finding on recent CT's of chest and abdomen  - CT chest abdomen pelvis showed cluster of nodules in the Right upper lobe (possibly infectious versus inflammatory).   - also referenced: 12 x 7 mm nodular opacity right upper lobe image -> previously measured 10 x 6 mm.     - Pulmonology consulted - pt would like to f/up with her primary pulmonologist (has appt in July 2023)  Consult for pulmonary nodule clinic also has been sent     6. Anemia; unclear significance.  - hgb improved today 12.3 (10.7)    7. Poor PO intake, weight loss; possible malnutrition.  - RD consult pending  - change diet from Cardiac to low Na/low fat     8.  Left dorsal foot pain; Stephanie thinks she might have injured her Left foot recently; does not have pain with weight-bearing.  No change on exam today    CT 6/19/23 with no fracture - probable hematoma, as clinically suspected as appears today    9. Left lower back pain;        Severe right-sided L4-5 stenosis     chronic, without recent change or worsening.  - MRI findings not consistent with her usual pain or with infection  - continue current analgesics, increase activity as soon as able  (no specific c/o right-sided radiculopathy)  - follow up with PCP and possibly Orthopedics after discharge     10. New 2 mm nonobstructing calculus right upper pole.  - follow up with Urology after discharge  -  consider renal U/S per prior notes     11. Nonischemic cardiomyopathy  Mild fluid overload  - PTA was on Lasix 20 mg daily  - received IV fluid in the ED.  - started on Lasix 20 mg IV daily on 6/17-now transitioned to po lasix daily- continue daily weights, as able  - continue PTA metoprolol succinate 75 mg nightly.    Repeat ECHO today (as above)     12. Acute kidney injury on CKD (stage III)  - baseline creatinine around 1.2  - Creatinine on presentation 1.9   creat this am 1.37  Will hold further IV lasix (last dose was 6/19/23)  On lasix 20mg po daily since 6/20/23  BMP in the am - 6/23/23     13. Hypokalemia  Resolved on lab today     14. QTc prolongation  - initially QTc ~ 520.   - held tramadol and atomoxetine.  - repeat EKG QTc was 480  -Family requested restarting tramadol      15. History of urinary incontinence  - PTA was on mirabegron 50 mg daily and solifenacin.  - continue PTA mirabergron.  - hold solifenacin given QTc prolongation        Addendum - 1735  Spoke with daughter, Leelee, and updated her on ECHO, VQ, troponin requests and cardiology consult for further recommendations 6/23/23.     Diet: Combination Diet Low Saturated Fat Na <2400mg Diet; adding caffeine back today    DVT Prophylaxis: was on pneumatic Compression Devices; on eliquis  Hayes Catheter: PRESENT, indication: Other (Comment) (for imagaing procedures)  Lines: None     Cardiac Monitoring: None  Code Status: Full Code          Clinically Significant Risk Factors       Hypokalemia: Lowest K = 3 mmol/L in last 2 days, will replace as needed       Hypoalbuminemia: Lowest albumin = 3.2 g/dL at 6/17/2023 12:17 PM, will monitor as appropriate  Coagulation Defect: INR = 1.28 (Ref range: 0.85 - 1.15) and/or PTT = N/A, will monitor for bleeding    Hypertension: Noted on problem list               Expected Discharge Date: 6/23/23    Medical Decision Making     51 minute spent by me on the date of service doing chart review, history, exam,  "documentation & further activities per the note.     PPE Worn:  Mask, gloves     Diet: Combination Diet Low Saturated Fat Na <2400mg Diet  Snacks/Supplements Adult: Ensure Enlive; Between Meals    DVT Prophylaxis: DOAC  Hayes Catheter: Not present  Lines: None     Cardiac Monitoring: None  Code Status: Full Code      Clinically Significant Risk Factors              # Hypoalbuminemia: Lowest albumin = 3.2 g/dL at 6/17/2023 12:17 PM, will monitor as appropriate     # Hypertension: Noted on problem list                 Disposition Plan      Discharge home with home care services 6/23/23  Fany Ramírez DO  Hospitalist Service  [facility]  Securely message with [employer] (more info)  Text page via [facility] Paging/Directory   ______________________________________________________________________    Interval History   Up in chair this am.  Feeling that her breathing is \"ok\".  Still having sore left chest wall and tender area to palpation - moves to the back and down her left arm occasionally - did not notice it when she was up ambulating.  No HA, F/C, N/V.  Appreciated having help here to receive cares.      Physical Exam   Vital Signs: Temp: 97.6  F (36.4  C) Temp src: Oral BP: (!) 143/65 Pulse: 62   Resp: 18 SpO2: 95 % O2 Device: Nasal cannula Oxygen Delivery: 5 LPM  Weight: 116 lbs 12.8 oz    GEN:  Awake, appears comfortable and more alert this am  Mild conversational dyspnea and mild costal retractions bilaterally - but, overall, improved to me  HEENT:  Normocephalic/atraumatic, no scleral icterus, no nasal discharge, mouth appears moist  CV:  Regular rate and rhythm, no clear loud murmur or JVD.  S1 + S2 noted, no S3 or S4.  LUNGS:  Min dry crackles ant/lat bilaterally.  No significant rhonchi/wheezing. Symmetric chest rise on inhalation noted.  ABD:  Active bowel sounds, soft, non-tender/not reatlly distended.  No rebound/guarding/rigidity.  EXT:  Chronic venous discoloration changes " to pretibial area with 1+ pitting pretibial edema bilaterally.  Scattered scabbed areas that are healing.  Small hematoma noted again on the top of the left foot - no change.  PSYCH:  Mood appropriate, more interactive today    Medications       fluticasone-vilanterol  1 puff Inhalation Daily    And     umeclidinium  1 puff Inhalation Daily     furosemide  20 mg Oral Daily     gabapentin  600 mg Oral At Bedtime     ipratropium  2 puff Inhalation 4x daily     lidocaine  1-3 patch Transdermal Q24H     lidocaine   Transdermal Q8H [facility]     metoprolol succinate ER  75 mg Oral At Bedtime     mirabegron  25 mg Oral Daily     piperacillin-tazobactam  3.375 g Intravenous Q8H     rivaroxaban ANTICOAGULANT  15 mg Oral At Bedtime     simvastatin  40 mg Oral At Bedtime     sodium chloride (PF)  3 mL Intracatheter Q8H     tamoxifen  20 mg Oral Daily at 4 pm     traMADol  50 mg Oral BID     vancomycin  500 mg Intravenous Q18H       Data     Labs and Imaging results below reviewed today.  Recent Labs   Lab 06/21/23  0736 06/20/23  0648 06/19/23  1029 06/18/23  1018   WBC  --  10.6 25.8* 25.1*   HGB 12.3 10.7* 13.7 13.6   HCT  --  34.1* 43.5 42.2   MCV  --  89 89 87   PLT  --  223 280 250     Recent Labs   Lab 06/21/23  0736 06/20/23  2344 06/20/23  1319 06/20/23  0648 06/19/23  1928 06/19/23  1030     --   --  140  --  141   POTASSIUM 3.8 3.7 3.4* 3.1*   < > 3.0*   CHLORIDE 108*  --   --  104  --  101   CO2 26  --   --  28  --  25   ANIONGAP 8  --   --  8  --  15   GLC 83  --   --  118  --  151*   BUN 20  --   --  27  --  28   CR 1.37*  --   --  1.29*  --  1.63*   GFRESTIMATED 39*  --   --  42*  --  32*   MESSI 8.3*  --   --  8.0*  --  9.0    < > = values in this interval not displayed.     7-Day Micro Results     Collected Updated Procedure Result Status      06/21/2023 1826 06/21/2023 2156 MRSA MSSA PCR, Nasal Swab [[id number]]    Swab from Nares, Bilateral    Final result Component Value   MRSA Target DNA Negative   SA  Target DNA Negative            06/17/2023 1342 06/21/2023 1501 Blood Culture Peripheral Blood [[id number]]   Peripheral Blood    Preliminary result Component Value   Culture No growth after 4 days  [P]                06/17/2023 1326 06/21/2023 1501 Blood Culture Peripheral Blood [[id number]]   Peripheral Blood    Preliminary result Component Value   Culture No growth after 4 days  [P]                06/17/2023 1326 06/17/2023 1413 Symptomatic Influenza A/B, RSV, & SARS-CoV2 PCR (COVID-19) Nasopharyngeal [[id number]]    Swab from Nasopharyngeal    Final result Component Value   Influenza A PCR Negative   Influenza B PCR Negative   RSV PCR Negative   SARS CoV2 PCR Negative   NEGATIVE: SARS-CoV-2 (COVID-19) RNA not detected, presumed negative.                Recent Labs   Lab 06/21/23  0736 06/20/23  2344 06/20/23  1319 06/20/23  0648 06/19/23  1928 06/19/23  1030 06/18/23  0711 06/17/23  1217     --   --  140  --  141   < > 136   POTASSIUM 3.8 3.7 3.4* 3.1*   < > 3.0*   < > 3.9   CHLORIDE 108*  --   --  104  --  101   < > 94*   CO2 26  --   --  28  --  25   < > 29   ANIONGAP 8  --   --  8  --  15   < > 13   GLC 83  --   --  118  --  151*   < > 93   BUN 20  --   --  27  --  28   < > 21   CR 1.37*  --   --  1.29*  --  1.63*   < > 1.94*   GFRESTIMATED 39*  --   --  42*  --  32*   < > 26*   MESSI 8.3*  --   --  8.0*  --  9.0   < > 9.8   MAG 2.0  --  2.0 2.1  --  2.2   < > 2.1   PROTTOTAL  --   --   --   --   --   --   --  7.7   ALBUMIN  --   --   --   --   --   --   --  3.2*   BILITOTAL  --   --   --   --   --   --   --  0.9   ALKPHOS  --   --   --   --   --   --   --  71   AST  --   --   --   --   --   --   --  23   ALT  --   --   --   --   --   --   --  10    < > = values in this interval not displayed.     No results for input(s): NTBNPI, NTBNP in the last 168 hours.  Recent Labs   Lab 06/17/23  1217   INR 1.28*     Recent Labs   Lab 06/17/23  1217   LACT 1.7       Recent Results (from the past 24 hour(s))    XR Ribs Left Port 2 Views    Narrative    EXAM: XR RIBS LEFT PORT 2 VIEWS  LOCATION: [facility]  DATE: 6/21/2023    INDICATION: Left back pain.  COMPARISON: 06/17/2023, 04/10/2022      Impression    IMPRESSION: No acute rib fracture seen. Left mid chest scarring. Left inferior chest worsening opacity which could represent atelectasis or pneumonia.    Stable heart size.

## 2023-06-22 NOTE — PLAN OF CARE
Problem: Plan of Care - These are the overarching goals to be used throughout the patient stay.    Goal: Optimal Comfort and Wellbeing  Outcome: Progressing    Problem: COPD (Chronic Obstructive Pulmonary Disease) Comorbidity  Goal: Maintenance of COPD Symptom Control  Outcome: Progressing     Problem: Pain Chronic (Persistent)  Goal: Optimal Pain Control and Function  Outcome: Progressing      Goal Outcome Evaluation:  A&Ox4. VSS. Continues on 2-5L of O2 via NC. Severe LU, lung sounds course. +1-+2 edema to BLE. MRSA swab obtained. Pt on contact precautions until ruled out. Verbalized 6/10 low back pain. PRN oxycodone given. Plan is to continue IV antibiotics.

## 2023-06-22 NOTE — PROGRESS NOTES
"Chart reviewed.       CM met with patient. She is agreeable to Home Care services if recommended. She indicated preference for \"[facility]\"; CM explained HE and [place] [facility] now called [facility] and patient agreeable to referral being sent. CM sent HC referral per request.     Daughter will transport at hospital discharge.     1:40 PM  [facility] confirms agency is able to accept for PT/OT.   "

## 2023-06-22 NOTE — DISCHARGE INSTRUCTIONS
Right lung nodule  - she will reach out to her Pulmonologist (has an appointment already in July)  - see if they want a PET or anything else prior to her visit.  - Will also refer her to our lung clinic (she would like a second opinion)  - I have notified our clinic and placed order    COPD on home O2  - will refer to pulmonary rehab to help her exercise capacity.      Home care services have been arrange for you    Home Care Agency: [facility]    Home Care Phone: [phone]    Services: PT/OT    Instructions: Home care will visit within 48 hrs after discharge.  Provider will call you to schedule visit.

## 2023-06-23 NOTE — PLAN OF CARE
Problem: Pneumonia  Goal: Effective Oxygenation and Ventilation  Outcome: Progressing     Problem: Plan of Care - These are the overarching goals to be used throughout the patient stay.    Goal: Optimal Comfort and Wellbeing  Outcome: Adequate for Care Transition     Problem: Risk for Delirium  Goal: Improved Sleep  Outcome: Adequate for Care Transition     Goal Outcome Evaluation:  Pt pleasant and cooperative throughout shift. A&ox4. Slept throughout night in chair per pt request. BP elevated. O2 stats in low to mid 90s on 4 L NC. Purewick in place for diuresis to promote sleep. Able to make needs known. Hematoma present on L foot. Potassium recheck this morning.

## 2023-06-23 NOTE — CONSULTS
HEART CARE CONSULTATON NOTE            Reason for consult: 78-year-old female with a history of hypertension, breast cancer status post radiation, COPD who presented with confusion and new patchy infiltrate on CT scan.  Echocardiogram completed yesterday that reported an ejection fraction of 20 to 25%.  This is declined from April 2022 as time ejection fraction was said to be 35 to 40%.    Primary cardiologist is Dr. Chilel.     Assessment:   1.  Patient with a history of LV dysfunction described as nonischemic.  Based on notes from the chart record she had minimal coronary artery disease on angiography in 2003 with a negative nuclear stress test in 2016 but no more recent evaluation.  She has a history of significant underlying COPD.  She had an echocardiogram completed this admission where the ejection fraction was said to be lower than previous 20 to 25%.  She has an underlying left bundle branch block.  Per chart notes ejection fraction has been quite variable on echocardiograms throughout the years.  BNP this admission on June 17 was significantly elevated at 1167.  Troponins have been negative.  She has had a net urine output of 934 cc.  No weight today.  Yesterday's weight appears to be inaccurate compared to the previous weight.  Recent x-ray suggests stable upper normal heart size left basilar opacity slightly improved from previous with trace left pleural effusion.  Echocardiogram personally reviewed and ejection fraction does appear in the 25% range.  There is significant septal wall motion abnormality consistent with a left bundle branch block.    2.  Patient has reported some episodic chest discomfort down her left arm.  Troponins have been negative this admission.  She has an underlying left bundle branch block.  Renal function is improved.  Creatinine down to 1.07.  Discomfort is quite atypical and sounds potentially more musculoskeletal although an anginal equivalent is not excluded.    3.   Left-sided pneumonia.    Current medications include amoxicillin/clavulanate  Lasix 20 mg p.o. daily  Losartan 12.5 mg daily  Metoprolol ER 75 mg daily  Simvastatin.    Addendum: Patient continues to have some shortness of breath.  BNP is higher today's exam is challenging.  Discussed with Dr. Terrazas.  We will place her on 20 mg of IV Lasix every 8 hours and reassess tomorrow.  Need to monitor electrolytes and renal function closely.     Plan:   1.  Patient may benefit from biventricular pacemaker device suggested further assessment with my EP colleagues as an outpatient.  This will depend on her risks related to her underlying COPD.  She would be interested in meeting with the EP physicians.  2.  Repeat BNP.  Elevated on admission.  Question if needs additional diuresis.  Exam is somewhat challenging.   Trace pleural effusion reported on the most recent chest x-ray from June 22.  3.  Would consider CT coronary angiogram to reevaluate coronary artery disease. It has been a number of years since she had evaluation of her coronaries in the setting of LV dysfunction.  Creatinine was elevated earlier in the admission although now back to baseline. Creat was as high as 1.9 on June 17.  Do not feel that CT angiogram is urgent and would like to document that creatinine remains stable as discussed with patient.  4.  Patient is quite anxious to be discharged.  She is however willing to stay at this point..  5.  Abnormal nuclear lung scan yesterday.  Scattered small subsegmental perfusion defects.  Await additional input from primary care and pulmonary.  6.  May benefit from Jardiance or Farxigaher for her LV dysfunction.  This can be discussed outpatient follow-up clinic.     History:      HPI: 78-year-old female with a history of advanced COPD on oxygen at home, hypertension, nonischemic cardiomyopathy with previous ejection fraction reported to be 40%, severe breast cancer status post radiation therapy.  Presented to the  hospital after an episode of confusion.  They noted per chart review that she was more confused as of late and was experiencing visual hallucinations.  Also was reporting right foot pain but reporting no chest discomfort nausea or vomiting per the admission chart records.  Patient has been seen in the heart failure clinic.  She was most recently seen in the heart failure clinic by the nurse practitioner April 2023.  Seen by report has a history of nonischemic cardiomyopathy.  He was hospitalized March 2021 with acute COPD exacerbation.  Review of chart records finds that she has had ejection fractions as low as 30% on prior echocardiogram from July 2020.  She had a negative nuclear stress test in April 2016 with ejection fraction reported to be depressed but not calculated.  Patient reports that the confusion symptoms have resolved.  She does still endorse some shortness of breath although states that it is improved and stable.  She reports that she had been experiencing discomfort on the left side of her chest underneath her axilla that was somewhat persistent over a few day period of time but has improved.  No clear tenderness to palpation.  Past Medical History:   Diagnosis Date     ANATOLIY (acute kidney injury) (H)      Allergic rhinitis      Arrhythmia      Atrial fibrillation with RVR (H)      Bacteremia      Breast cancer (H) 2017     Cardiomyopathy (H)      Centrilobular emphysema (H)      CHF (congestive heart failure) (H)      CKD (chronic kidney disease)      COPD (chronic obstructive pulmonary disease) (H)      Coronary artery disease      Depression      Dysphagia      E. coli sepsis (H)      Factor 5 Leiden mutation, heterozygous (H)      GERD (gastroesophageal reflux disease)      Heart failure with reduced ejection fraction (H) 4/17/2023     Hx of radiation therapy 2017     Hyperlipidemia      Hypertension      Hypokalemia      Hypomagnesemia      Idiopathic cardiomyopathy (H)      Left hip pain  4/30/2014     OAB (overactive bladder)      Osteoporosis      Sacral insufficiency fracture      Sinusitis      Syncope      Urge incontinence      Vocal cord dysfunction       Past Surgical History:   Procedure Laterality Date     ARTHROPLASTY REVISION HIP Left      BIOPSY BREAST Right 2017     BLADDER SURGERY      bladder interstim with removal     CARDIAC CATHETERIZATION       CATARACT EXTRACTION Left 07/18/2017     IR ABDOMINAL AORTOGRAM  4/16/2003     IR AORTIC ARCH 4 VESSEL ANGIOGRAM  4/16/2003     IR MISCELLANEOUS PROCEDURE  4/16/2003     LUMPECTOMY BREAST Left 06/2017    x2     PICC DOUBLE LUMEN PLACEMENT  4/11/2022          PICC TRIPLE LUMEN PLACEMENT  4/7/2022          ZZC OPEN TX FEMORAL FRACTURE DISTAL MED/LAT CONDYLE Left 10/28/2015    Procedure: OPEN REDUCTION INTERNAL FIXATION LEFT  PROXIMAL FEMUR PERIPROSTHETIC FRACTURE;  Surgeon: Yovanny Albarran MD;  Location: [facility];  Service: Orthopedics      Social History     Socioeconomic History     Marital status:      Spouse name: Not on file     Number of children: Not on file     Years of education: Not on file     Highest education level: Not on file   Occupational History     Not on file   Tobacco Use     Smoking status: Former     Types: Cigarettes     Quit date: 10/28/2007     Years since quitting: 15.6     Smokeless tobacco: Former     Quit date: 9/6/2004   Vaping Use     Vaping Use: Former   Substance and Sexual Activity     Alcohol use: No     Drug use: No     Sexual activity: Not on file   Other Topics Concern     Not on file   Social History Narrative     and lives in home with 3 flight of steps     Social Determinants of Health     Financial Resource Strain: Not on file   Food Insecurity: Not on file   Transportation Needs: Not on file   Physical Activity: Not on file   Stress: Not on file   Social Connections: Not on file   Intimate Partner Violence: Not on file   Housing Stability: Not on file     Family History  "  Problem Relation Age of Onset     Osteoporosis Other      Prostate Cancer Brother      Breast Cancer Maternal Aunt         age thought to be in her 70's-80's     Prostate Cancer Maternal Uncle      Allergies   Allergen Reactions     Sulfa (Sulfonamide Antibiotics) [Sulfa Antibiotics] Rash     Headaches and dizziness.     Homeopathic Drugs [External Allergen Needs Reconciliation - See Comment] Unknown and Other (See Comments)     Comment: runny nose, Comment: runny nose     Mold Extracts [Molds & Smuts] Unknown     Morphine (Pf) [Morphine] Unknown     hallucinate     Lisinopril Cough, Unknown and Itching     Comment: cough, Comment: cough     Sulfacetamide Sodium [Sulfacetamide] Rash     No current outpatient medications on file.         Review of Systems  All pertinent positives and negatives reviewed in History.  All other 10 point review of systems reviewed and negative.      Objective:    Physical Exam  VITALS: /61 (BP Location: Left arm)   Pulse 65   Temp 97.9  F (36.6  C) (Oral)   Resp 20   Ht 1.499 m (4' 11\")   Wt 53 kg (116 lb 12.8 oz)   SpO2 95%   BMI 23.59 kg/m    BMI: Body mass index is 23.59 kg/m .  Wt Readings from Last 3 Encounters:   06/21/23 53 kg (116 lb 12.8 oz)   05/18/23 50.3 kg (111 lb)   04/17/23 49.2 kg (108 lb 8 oz)       Intake/Output Summary (Last 24 hours) at 6/23/2023 0819  Last data filed at 6/23/2023 0000  Gross per 24 hour   Intake 218 ml   Output 650 ml   Net -432 ml     General Appearance:  Alert, cooperative, no distress, appears stated age   HEENT:  Normocephalic, without obvious abnormality   Neck: Supple, symmetrical, trachea midline, no adenopathy, thyroid: not enlarged, symmetric, no carotid bruit or obvious JVD   Back:   Symmetric   Lungs:    Decreased breath sounds bilaterally, respirations unlabored   Chest Wall:  No tenderness or deformity   Heart:  Regular rate and rhythm, S1, S2 normal, distant heart tones, S4 suggested   Abdomen:   Soft, non-tender, bowel " sounds active   no masses, no organomegaly   Extremities: Extremities normal, atraumatic, no cyanosis or edema   Skin: Skin color, texture, turgor normal, no rashes or lesions   Neurologic: Alert and oriented X 3, Moves all 4 extremities     Cardiographics  ECG: 2023, sinus rhythm with premature atrial complexes, left bundle branch block.  Compared to  PVCs are no longer present.  E Show images for Echocardiogram Complete  Echocardiogram Complete  Order: [id number]   Status: Edited Result - FINAL      Visible to patient: Yes (not seen)      Next appt: Today at 10:30 AM in Physical Therapy (Lizzy Patricio, PT)      0 Result Notes  Details    Reading Physician Reading Date Result Priority   Fernando Clancy MD  [phone] 2023      Narrative & Impression  [id number]  [id number]  [id number]  [id number]^GONZALO^[place] [address]     Name: JUDY ZARCO  MRN: [id number]  : 1945  Study Date: 2023 01:19 PM  Age: 78 yrs  Gender: Female  Patient Location: [facility]  Reason For Study: Chest Pain  Ordering Physician: PATO SÁNCHEZ  Performed By: TITUS     BSA: 1.5 m2  Height: 59 in  Weight: 117 lb  HR: 63  BP: 130/72 mmHg  ______________________________________________________________________________  Procedure  Complete Echo Adult. Definity (NDC #86930-951) given intravenously.  Technically difficult study. No hemodynamically significant valvular  abnormalities on 2D or color flow imaging. Compared to the prior study of  22 there has been a decline in left ventricular systolic function.  ______________________________________________________________________________  Interpretation Summary     The left ventricle is normal in size with mild concentric left ventricular  hypertrophy.  Left ventricular function is decreased. The ejection fraction is 20-25%  (severely reduced). There is moderate global hypokinesia of the  left  ventricle.  Grade III or advanced diastolic dysfunction.  Moderately decreased right ventricular systolic function  The left atrium is mildly dilated.  No hemodynamically significant valvular abnormalities on 2D or color flow  imaging.  Compared to the prior study of 4/8/22 there has been a decline in left  ventricular systolic function.  ______________________________________________________________________________  Left Ventricle  The left ventricle is normal in size. Left ventricular function is decreased.  The ejection fraction is 20-25% (severely reduced). There is mild concentric  left ventricular hypertrophy. Grade III or advanced diastolic dysfunction.  There is moderate global hypokinesia of the left ventricle.     Right Ventricle  The right ventricle is normal size. Moderately decreased right ventricular  systolic function.     Atria  The left atrium is mildly dilated. Right atrial size is normal. There is no  color Doppler evidence of an atrial shunt.     Mitral Valve  There is mild mitral annular calcification. There is trace mitral  regurgitation. There is no mitral valve stenosis.     Tricuspid Valve  Tricuspid valve leaflets appear normal. There is no evidence of tricuspid  stenosis or clinically significant tricuspid regurgitation. Right ventricular  systolic pressure could not be approximated due to inadequate tricuspid  regurgitation.     Aortic Valve  The aortic valve is trileaflet. Mild aortic valve calcification is present. No  aortic stenosis is present.     Pulmonic Valve  The pulmonic valve is not well seen, but is grossly normal. This degree of  valvular regurgitation is within normal limits. There is trace pulmonic  valvular regurgitation.     Vessels  The aorta root is normal. Normal size ascending aorta. IVC diameter <2.1 cm  collapsing >50% with sniff suggests a normal RA pressure of 3 mmHg.     Pericardium  There is no pericardial effusion.     chocardiogram:    Imaging  Chest  x-ray:   Narrative & Impression   EXAM: XR CHEST 2 VIEWS  LOCATION: [facility]  DATE: 6/22/2023     INDICATION: COPD exacerbation. Pneumonia.  COMPARISON: 06/21/2023, 06/17/2023                                                                      IMPRESSION: Left basilar opacity slightly improved from the rib films 2 days ago. Trace left pleural effusion. These findings are consistent with evolving pneumonia.     Stable upper normal heart size. Stable left and right upper chest scarring.     Old bilateral rib fractures. Stable lower thoracic severe vertebral body compression fracture compared to CT 06/17/2023.          Lab Results    Chemistry/lipid CBC Cardiac Enzymes/BNP/TSH/INR   No results for input(s): CHOL, HDL, LDL, TRIG, CHOLHDLRATIO in the last 47509 hours.  No results for input(s): LDL in the last 11787 hours.  Recent Labs   Lab Test 06/23/23  0638      POTASSIUM 3.4*   CHLORIDE 104   CO2 28   GLC 86   BUN 17   CR 1.07   GFRESTIMATED 53*   MESSI 8.9     Recent Labs   Lab Test 06/23/23  0638 06/22/23  0742 06/21/23  0736   CR 1.07 1.35* 1.37*     No results for input(s): A1C in the last 53186 hours.       Recent Labs   Lab Test 06/23/23  0638 06/21/23  0736 06/20/23  0648   WBC  --   --  10.6   HGB  --  12.3 10.7*   HCT  --   --  34.1*   MCV  --   --  89     --  223     Recent Labs   Lab Test 06/21/23  0736 06/20/23  0648 06/19/23  1029   HGB 12.3 10.7* 13.7    Recent Labs   Lab Test 06/22/23  1444 06/22/23  0742 06/21/23  0736   TROPONINI 0.02 0.02 0.04     Recent Labs   Lab Test 06/17/23  1217 04/11/22  1316 04/07/22  1437   BNP 1,167* 1,063* 157*     No results for input(s): TSH in the last 33143 hours.  Recent Labs   Lab Test 06/17/23  1217 02/20/21  2040   INR 1.28* 1.16*

## 2023-06-23 NOTE — PLAN OF CARE
Problem: Plan of Care - These are the overarching goals to be used throughout the patient stay.    Goal: Plan of Care Review  Description: The Plan of Care Review/Shift note should be completed every shift.  The Outcome Evaluation is a brief statement about your assessment that the patient is improving, declining, or no change.  This information will be displayed automatically on your shift note.  Outcome: Progressing    Goal Outcome Evaluation:    Patient is A & O x 4. VSS. Remains on 3-4 L on shift to maintain sats > 92 %. Ongoing back pain. Oxycodone was discontinued. On shift, scheduled tramadol provided and dose was increased, along with PRN flexeril. Patient found relief with this schedule.     Using the Pure Wick due to incontinence and increased urination due to IV Lasix use. Patient educated on no Pure Wick use in the chair re: vaginal soreness and increased risk for pressure injuries. Patient requests to use Pure Wick in chair against advice.    IV is SL. On PO ABX. Prednisone x 1 added by MD today. On the K+ & Mg protocol. K+ po replacement provided. Recheck K+ WNL. K+ & Mg are recheck in the AM. Cardiology consult completed.     Low fat/NA < 240 diet. SBA with walker/GB. Alarms on for safety. Discharge pending clinical improvement.

## 2023-06-23 NOTE — PROGRESS NOTES
Pulmonary Progress Note  6/23/2023      Admit Date: 6/17/2023  CODE: Full Code    Reason for Consult: acute on chronic respiratory failure with hypoxemia. Possible CAP, COPD exacerbation    Assessment/Plan:   78F with severe COPD on home O2, followed at [facility], breast cancer s/p radiation, severe emphysema, former smoker, atmitted with acute on chronic respiratory failure and CT changes c/w possible CAP. Slow to improve but remains quite dyspneic with exertion. We discussed that this is unlikely to improve that much while in the hospital. I recommended pulmonary rehab as an outpatient to try and improver her exercise tolerance.    Recommendations:  - continue supplemental O2 for SpO2 goal 88-92%, avoid hyperoxia  - encourage OOB, PT/OT, push IS  - prednisone 40mg daily for 5 days  - continue ICS/LABA and LAMA per outpatient reigimen  - continue augmentin, recommend 7 days total  - outpatient clinic follow up and pulmonary rehab ordered by Dr. Nettles  - follow up with [facility] pulmonologist next month as scheduled    No further recommendations, will sign off. No contraindications to discharge from pulmonary perspective. Please call with additional questions.     Pedro (Jamison) MD Harjinder  [facility]  Pager [phone]  Clinic [phone]  Fax [phone]     Subjective/Interim Events:   Still quite dyspneic with exertion. Hoping to go home soon.  She is on her baseline O2 requirement.  Denies cough, hemoptysis.       Medications:       amoxicillin-clavulanate  1 tablet Oral Q12H [facility] (08[facility])     fluticasone-vilanterol  1 puff Inhalation Daily    And     umeclidinium  1 puff Inhalation Daily     furosemide  20 mg Oral Daily     gabapentin  600 mg Oral At Bedtime     ipratropium  2 puff Inhalation 4x daily     lidocaine  1-3 patch Transdermal Q24H     lidocaine   Transdermal Q8H VY     losartan  12.5 mg Oral Daily     metoprolol succinate ER  75 mg Oral At Bedtime      "mirabegron  25 mg Oral Daily     predniSONE  40 mg Oral Daily     rivaroxaban ANTICOAGULANT  15 mg Oral At Bedtime     simvastatin  40 mg Oral At Bedtime     sodium chloride (PF)  3 mL Intracatheter Q8H     tamoxifen  20 mg Oral Daily at 4 pm     technetium pentetate Tc99m  65-75 millicurie Inhalation Once     traMADol  50 mg Oral BID         Exam/Data:   Vitals  /57 (BP Location: Right arm, Patient Position: Sitting, Cuff Size: Adult Regular)   Pulse 68   Temp 97.9  F (36.6  C) (Oral)   Resp 20   Ht 1.499 m (4' 11\")   Wt 53 kg (116 lb 12.8 oz)   SpO2 95%   BMI 23.59 kg/m       I/O last 3 completed shifts:  In: 218 [P.O.:118; I.V.:100]  Out: 650 [Urine:650]  Weight change:   [unfilled]  Resp: 20      EXAM:  Physical Exam  Gen: awake, alert, oriented, no distress  HEENT: NT, no CHRISTINA  CV: RRR, no m/g/r  Resp: very diminished BS. Mild exp wheezing L base > R  Abd: soft, nontender, BS+  Skin: no rashes or lesions  Ext: no edema  Neuro: PERRL, nonfocal exam    ROS:  A 10-system review was obtained and is negative with the exception of the symptoms noted above.    DATA:    PFT DATA   Reviewed in the [facility] notes       IMAGING:   MRA Brain ([place]) wo Contrast    Result Date: 6/18/2023  EXAM: MR BRAIN W/O CONTRAST, MRA BRAIN ([place]) W/O CONTRAST LOCATION: [facility] DATE: 6/18/2023 INDICATION: History of breast cancer, presented with significant episodes of confusion, visual hallucination.  No focal motor deficits.  Leukocytosis. COMPARISON: 06/17/2023 TECHNIQUE: 1) Routine multiplanar multisequence head MRI without intravenous contrast. 2) 3D time-of-flight head MRA without intravenous contrast. FINDINGS: HEAD MRI: Evaluation for metastatic disease is limited in the absence of contrast. INTRACRANIAL CONTENTS: No acute or subacute infarct. No mass, acute hemorrhage, or extra-axial fluid collections. Patchy nonspecific T2/FLAIR hyperintensities within the cerebral " white matter and jose most consistent with mild to moderate chronic microvascular ischemic change. There is chronic cortical infarct of the medial left temporal and occipital lobes. Mild generalized cerebral atrophy. No hydrocephalus. Normal position of the cerebellar tonsils. SELLA: No abnormality accounting for technique. OSSEOUS STRUCTURES/SOFT TISSUES: Normal marrow signal. The major intracranial vascular flow voids are maintained. ORBITS: Prior bilateral cataract surgery. Visualized portions of the orbits are otherwise unremarkable. SINUSES/MASTOIDS: There is moderate mucosal thickening of left sphenoid sinus. No middle ear or mastoid effusion. HEAD MRA: ANTERIOR CIRCULATION: No stenosis/occlusion, aneurysm, or high flow vascular malformation. Standard [place] anatomy. POSTERIOR CIRCULATION: There is gradual tapering and eventual occlusion of the distal left posterior cerebral artery. The right posterior cerebral artery is widely patent. No aneurysm, or high flow vascular malformation. Balanced vertebral arteries supply a normal basilar artery.     IMPRESSION: HEAD MRI: 1.  No acute intracranial process. 2.  Generalized brain atrophy and presumed microvascular ischemic changes as detailed above. 3.  Chronic left PCA distribution cortical infarct. 4.  Left sphenoid sinusitis. HEAD MRA: 1.  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 2.  Otherwise unremarkable MRI without intravenous.    MR Brain w/o Contrast    Result Date: 6/18/2023  EXAM: MR BRAIN W/O CONTRAST, MRA BRAIN ([place]) W/O CONTRAST LOCATION: [facility] DATE: 6/18/2023 INDICATION: History of breast cancer, presented with significant episodes of confusion, visual hallucination.  No focal motor deficits.  Leukocytosis. COMPARISON: 06/17/2023 TECHNIQUE: 1) Routine multiplanar multisequence head MRI without intravenous contrast. 2) 3D time-of-flight head MRA without intravenous  contrast. FINDINGS: HEAD MRI: Evaluation for metastatic disease is limited in the absence of contrast. INTRACRANIAL CONTENTS: No acute or subacute infarct. No mass, acute hemorrhage, or extra-axial fluid collections. Patchy nonspecific T2/FLAIR hyperintensities within the cerebral white matter and jose most consistent with mild to moderate chronic microvascular ischemic change. There is chronic cortical infarct of the medial left temporal and occipital lobes. Mild generalized cerebral atrophy. No hydrocephalus. Normal position of the cerebellar tonsils. SELLA: No abnormality accounting for technique. OSSEOUS STRUCTURES/SOFT TISSUES: Normal marrow signal. The major intracranial vascular flow voids are maintained. ORBITS: Prior bilateral cataract surgery. Visualized portions of the orbits are otherwise unremarkable. SINUSES/MASTOIDS: There is moderate mucosal thickening of left sphenoid sinus. No middle ear or mastoid effusion. HEAD MRA: ANTERIOR CIRCULATION: No stenosis/occlusion, aneurysm, or high flow vascular malformation. Standard [place] anatomy. POSTERIOR CIRCULATION: There is gradual tapering and eventual occlusion of the distal left posterior cerebral artery. The right posterior cerebral artery is widely patent. No aneurysm, or high flow vascular malformation. Balanced vertebral arteries supply a normal basilar artery.     IMPRESSION: HEAD MRI: 1.  No acute intracranial process. 2.  Generalized brain atrophy and presumed microvascular ischemic changes as detailed above. 3.  Chronic left PCA distribution cortical infarct. 4.  Left sphenoid sinusitis. HEAD MRA: 1.  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 2.  Otherwise unremarkable MRI without intravenous.    US Lower Extremity Venous Duplex Left    Result Date: 6/18/2023  EXAM: US LOWER EXTREMITY VENOUS DUPLEX LEFT LOCATION: [facility] DATE: 6/18/2023 INDICATION: Left leg pain. Left foot  pain. COMPARISON: None. TECHNIQUE: Venous Duplex ultrasound of the left lower extremity with and without compression, augmentation and duplex. Color flow and spectral Doppler with waveform analysis performed. FINDINGS: Exam includes the common femoral, femoral, popliteal, and contralateral common femoral veins as well as segmentally visualized deep calf veins and greater saphenous vein. LEFT: No deep vein thrombosis. No superficial thrombophlebitis. No popliteal cyst. There is a subcutaneous collection of fluid in the anterior left foot along the area of pain measuring 4.3 x 0.6 x 2.4 cm.     IMPRESSION: 1.  No deep venous thrombosis in the left lower extremity. 2.  Subcutaneous fluid collection in the anterior left foot along the area of pain measuring 4.3 x 0.6 x 2.4 cm. This is nonspecific and could represent a seroma or hematoma. Infection can be considered if there are related symptoms.     CT Chest w/o Contrast    Result Date: 6/17/2023  EXAM: CT CHEST W/O CONTRAST, CT ABDOMEN PELVIS W/O CONTRAST LOCATION: [facility] DATE: 6/17/2023 INDICATION: Shortness of breath, confusion and leukocytosis. History of COPD. Increased opacities left lung on chest radiograph. Sepsis and acute kidney injury. COMPARISON: 2 view chest 06/17/2023, CT chest 04/10/2023, CT pelvis 10/11/2022, CT chest abdomen pelvis 04/08/2022 TECHNIQUE: CT scan of the chest, abdomen, and pelvis was performed without IV contrast. Multiplanar reformats were obtained. Dose reduction techniques were used. CONTRAST: None. FINDINGS: LUNGS AND PLEURA: Advanced emphysema redemonstrated, scattered subpleural scarring and mild bronchial wall thickening redemonstrated. 12 x 7 mm nodular opacity right upper lobe image 95 series 4 previously measured 10 x 6 mm. Adjacent cluster of new nodules, largest measuring measure 8 mm image 114. New patchy consolidative airspace opacities within the lung bases, greater on the left, compatible with  pneumonia. Band of scarring bronchiectasis left upper lobe along the major fissure unchanged. Central airways are patent. MEDIASTINUM/AXILLAE: No mass/adenopathy nor pericardial effusion. Mild cardiomegaly, overall decreased CORONARY ARTERY CALCIFICATION: Moderate. HEPATOBILIARY: Normal. PANCREAS: Normal. SPLEEN: Normal. ADRENAL GLANDS: Normal. KIDNEYS/BLADDER: Mild bilateral pelvocaliectasis is new compared to prior studies. 2 mm calcification right upper pole is new. No ureteral calculus. The bladder is negative. BOWEL: Diverticulosis of the colon. No acute inflammatory change. No obstruction. Normal appendix. LYMPH NODES: No lymphadenopathy. VASCULATURE: Aortic ectasia with moderate aortoiliac atherosclerosis. No aneurysm. PELVIC ORGANS: Normal. MUSCULOSKELETAL: Generalized demineralization and rightward lumbar scoliosis redemonstrated. Stable chronic T12 compression deformity. No suspicious osseous lesions.     IMPRESSION: 1.  New patchy consolidative airspace opacities within the lung bases, greater on the left, compatible with pneumonia. 2.  Increase in clustered nodular opacities right upper lobe may be infectious/inflammatory. Short interval follow-up CT in 3 months could be performed to reevaluate. 3.  Mild bilateral pelvocaliectasis new compared to prior studies. New 2 mm nonobstructing calculus right upper pole. No ureteral calculus. Consider short interval follow-up renal ultrasound with pre-/post void bladder.     CT Abdomen Pelvis w/o Contrast    Result Date: 6/17/2023  EXAM: CT CHEST W/O CONTRAST, CT ABDOMEN PELVIS W/O CONTRAST LOCATION: [facility] DATE: 6/17/2023 INDICATION: Shortness of breath, confusion and leukocytosis. History of COPD. Increased opacities left lung on chest radiograph. Sepsis and acute kidney injury. COMPARISON: 2 view chest 06/17/2023, CT chest 04/10/2023, CT pelvis 10/11/2022, CT chest abdomen pelvis 04/08/2022 TECHNIQUE: CT scan of the chest, abdomen,  and pelvis was performed without IV contrast. Multiplanar reformats were obtained. Dose reduction techniques were used. CONTRAST: None. FINDINGS: LUNGS AND PLEURA: Advanced emphysema redemonstrated, scattered subpleural scarring and mild bronchial wall thickening redemonstrated. 12 x 7 mm nodular opacity right upper lobe image 95 series 4 previously measured 10 x 6 mm. Adjacent cluster of new nodules, largest measuring measure 8 mm image 114. New patchy consolidative airspace opacities within the lung bases, greater on the left, compatible with pneumonia. Band of scarring bronchiectasis left upper lobe along the major fissure unchanged. Central airways are patent. MEDIASTINUM/AXILLAE: No mass/adenopathy nor pericardial effusion. Mild cardiomegaly, overall decreased CORONARY ARTERY CALCIFICATION: Moderate. HEPATOBILIARY: Normal. PANCREAS: Normal. SPLEEN: Normal. ADRENAL GLANDS: Normal. KIDNEYS/BLADDER: Mild bilateral pelvocaliectasis is new compared to prior studies. 2 mm calcification right upper pole is new. No ureteral calculus. The bladder is negative. BOWEL: Diverticulosis of the colon. No acute inflammatory change. No obstruction. Normal appendix. LYMPH NODES: No lymphadenopathy. VASCULATURE: Aortic ectasia with moderate aortoiliac atherosclerosis. No aneurysm. PELVIC ORGANS: Normal. MUSCULOSKELETAL: Generalized demineralization and rightward lumbar scoliosis redemonstrated. Stable chronic T12 compression deformity. No suspicious osseous lesions.     IMPRESSION: 1.  New patchy consolidative airspace opacities within the lung bases, greater on the left, compatible with pneumonia. 2.  Increase in clustered nodular opacities right upper lobe may be infectious/inflammatory. Short interval follow-up CT in 3 months could be performed to reevaluate. 3.  Mild bilateral pelvocaliectasis new compared to prior studies. New 2 mm nonobstructing calculus right upper pole. No ureteral calculus. Consider short interval  follow-up renal ultrasound with pre-/post void bladder.     Chest XR,  PA & LAT    Result Date: 6/17/2023  EXAM: XR CHEST 2 VIEWS LOCATION: [facility] DATE: 6/17/2023 INDICATION: Hypoxia. COMPARISON: 04/10/2022     IMPRESSION: Mild bandlike opacities in the left upper lung are likely due to scarring from the more extensive infiltrates and consolidation in this location on 04/10/2022. Additional patchy interstitial opacities throughout both lungs are likely due to scarring. No definite new infiltrates. Normal heart size and pulmonary vascularity. Old healed bilateral rib fractures. No significant pleural effusions.    Head CT w/o contrast    Result Date: 6/17/2023  EXAM: CT HEAD W/O CONTRAST LOCATION: [facility] DATE: 6/17/2023 INDICATION: ams COMPARISON: 03/23/2021 TECHNIQUE: Routine CT Head without IV contrast. Multiplanar reformats. Dose reduction techniques were used. FINDINGS: INTRACRANIAL CONTENTS: No intracranial hemorrhage, extraaxial collection, or mass effect.  No CT evidence of acute infarct. Moderate presumed chronic small vessel ischemic changes. Similar chronic cortical infarct of the inferior left temporal occipital lobe. Intracranial atherosclerosis is present. Mild to moderate generalized volume loss. No hydrocephalus. VISUALIZED ORBITS/SINUSES/MASTOIDS: No intraorbital abnormality. No paranasal sinus mucosal disease. No middle ear or mastoid effusion. BONES/SOFT TISSUES: No acute abnormality.     IMPRESSION: 1.  No CT evidence for acute intracranial process. 2.  Brain atrophy and presumed chronic microvascular ischemic changes as above. 3.  Chronic left temporal occipital infarct, similar prior.

## 2023-06-23 NOTE — PROGRESS NOTES
[facility]    Medicine Progress Note - Hospitalist Service    Date of Admission:  6/17/2023    Assessment & Plan     Irene León is a pleasant 78 year old woman with advanced COPD on 2 to 4 L at home, hypertension, nonischemic cardiomyopathy HFmrEF and breast cancer status post radiation who presented to the hospital 6/17/23 after an episode of confusion.  On presentation, she had mild fluid overload with elevated BNP.  Urinalysis was negative for any infection.  CT chest abdomen pelvis showed new patchy infiltrate bibasilarly possibly pneumonia; she was started on vancomycin and Zosyn as the source of infection was unclear.   MRA and MRI brain on 6/18: no acute abnormalities, chronic left PCA distribution cortical infarct.      Current problems include:    Acute metabolic encephalopathy  Mild cognitive impairment  Visual hallucinations         -No recurrence         -Had visual hallucinations 6/17         -Undergoing PT OT evaluations         -Therapies recommend home with home care services    Chest and arm pain  HFmrEF         -V/Q negative for PE         -Troponins negative, echo EF reduced to 20 to 25%         -Cardiology consulted         -Continue diuretics    Possible bibasilar pneumonia improving         -Continues on Zosyn and vancomycin         -Transition to p.o. Augmentin         -MRSA precautions discontinued/swab negative    COPD baseline severe  Oxygen dependent  Chronic hypoxic respiratory failure         -Currently baseline continue inhalers nebs         -Continue Acapella 4 times daily with nebs         -Pulmonary consulted, also regarding lymph nodes on CT    Anemia         -Nonbleeding, trending      Diet: Combination Diet Low Saturated Fat Na <2400mg Diet; adding caffeine back today    DVT Prophylaxis: was on pneumatic Compression Devices; on eliquis  Hayes Catheter: PRESENT, indication: Other (Comment) (for imagaing procedures)  Lines: None     Cardiac  Monitoring: None  Code Status: Full Code                Diet: Combination Diet Low Saturated Fat Na <2400mg Diet  Snacks/Supplements Adult: Ensure Enlive; Between Meals    DVT Prophylaxis: DOAC  Hayes Catheter: Not present  Lines: None     Cardiac Monitoring: None  Code Status: Full Code      Clinically Significant Risk Factors              # Hypoalbuminemia: Lowest albumin = 3.2 g/dL at 6/17/2023 12:17 PM, will monitor as appropriate     # Hypertension: Noted on problem list  # Chronic heart failure with reduced ejection fraction: last echo with EF <40%                Disposition Plan      Discharge home with home care services 6/23/23  Narendra Terrazas MD  Hospitalist Service  [facility]  Securely message with [employer] (more info)  Text page via [employer] Paging/Directory   ______________________________________________________________________    Interval History         Physical Exam   Vital Signs: Temp: 97  F (36.1  C) Temp src: Oral BP: (!) 151/70 Pulse: 81   Resp: 20 SpO2: 90 % O2 Device: Nasal cannula Oxygen Delivery: 4 LPM  Weight: 116 lbs 12.8 oz    GEN:  Awake, appears comfortable and more alert this am  Mild conversational dyspnea and mild costal retractions bilaterally - but, overall, improved to me  HEENT:  Normocephalic/atraumatic, no scleral icterus, no nasal discharge, mouth appears moist  CV:  Regular rate and rhythm, no clear loud murmur or JVD.  S1 + S2 noted, no S3 or S4.  LUNGS:  Min dry crackles ant/lat bilaterally.  No significant rhonchi/wheezing. Symmetric chest rise on inhalation noted.  ABD:  Active bowel sounds, soft, non-tender/not reatlly distended.  No rebound/guarding/rigidity.  EXT:  Chronic venous discoloration changes to pretibial area with 1+ pitting pretibial edema bilaterally.  Scattered scabbed areas that are healing.  Small hematoma noted again on the top of the left foot - no change.  PSYCH:  Mood appropriate, more interactive today    Medications        amoxicillin-clavulanate  1 tablet Oral Q12H [facility] (08/20)     fluticasone-vilanterol  1 puff Inhalation Daily    And     umeclidinium  1 puff Inhalation Daily     furosemide  20 mg Oral Daily     gabapentin  600 mg Oral At Bedtime     ipratropium  2 puff Inhalation 4x daily     lidocaine  1-3 patch Transdermal Q24H     lidocaine   Transdermal Q8H [facility]     losartan  12.5 mg Oral Daily     metoprolol succinate ER  75 mg Oral At Bedtime     mirabegron  25 mg Oral Daily     rivaroxaban ANTICOAGULANT  15 mg Oral At Bedtime     simvastatin  40 mg Oral At Bedtime     sodium chloride (PF)  3 mL Intracatheter Q8H     tamoxifen  20 mg Oral Daily at 4 pm     technetium pentetate Tc99m  65-75 millicurie Inhalation Once     traMADol  50 mg Oral BID       Data     Labs and Imaging results below reviewed today.  Recent Labs   Lab 06/23/23  0638 06/21/23  0736 06/20/23  0648 06/19/23  1029 06/18/23  1018   WBC  --   --  10.6 25.8* 25.1*   HGB  --  12.3 10.7* 13.7 13.6   HCT  --   --  34.1* 43.5 42.2   MCV  --   --  89 89 87     --  223 280 250     Recent Labs   Lab 06/23/23  0638 06/22/23  0742 06/21/23  0736    141 142   POTASSIUM 3.4* 4.0 3.8   CHLORIDE 104 106 108*   CO2 28 29 26   ANIONGAP 8 6 8   GLC 86 84 83   BUN 17 19 20   CR 1.07 1.35* 1.37*   GFRESTIMATED 53* 40* 39*   MESSI 8.9 9.2 8.3*     7-Day Micro Results     Collected Updated Procedure Result Status      06/21/2023 1826 06/21/2023 2156 MRSA MSSA PCR, Nasal Swab [[id number]]    Swab from Nares, Bilateral    Final result Component Value   MRSA Target DNA Negative   SA Target DNA Negative            06/17/2023 1342 06/22/2023 1501 Blood Culture Peripheral Blood [[id number]]   Peripheral Blood    Final result Component Value   Culture No Growth               06/17/2023 1326 06/22/2023 1501 Blood Culture Peripheral Blood [[id number]]   Peripheral Blood    Final result Component Value   Culture No Growth               06/17/2023 1326 06/17/2023 1415  Symptomatic Influenza A/B, RSV, & SARS-CoV2 PCR (COVID-19) Nasopharyngeal [[id number]]    Swab from Nasopharyngeal    Final result Component Value   Influenza A PCR Negative   Influenza B PCR Negative   RSV PCR Negative   SARS CoV2 PCR Negative   NEGATIVE: SARS-CoV-2 (COVID-19) RNA not detected, presumed negative.                Recent Labs   Lab 06/23/23  0638 06/22/23  0742 06/21/23  0736 06/20/23  2344 06/20/23  1319 06/18/23  0711 06/17/23  1217    141 142  --   --    < > 136   POTASSIUM 3.4* 4.0 3.8   < > 3.4*   < > 3.9   CHLORIDE 104 106 108*  --   --    < > 94*   CO2 28 29 26  --   --    < > 29   ANIONGAP 8 6 8  --   --    < > 13   GLC 86 84 83  --   --    < > 93   BUN 17 19 20  --   --    < > 21   CR 1.07 1.35* 1.37*  --   --    < > 1.94*   GFRESTIMATED 53* 40* 39*  --   --    < > 26*   MESSI 8.9 9.2 8.3*  --   --    < > 9.8   MAG  --  2.0 2.0  --  2.0   < > 2.1   PROTTOTAL  --   --   --   --   --   --  7.7   ALBUMIN  --   --   --   --   --   --  3.2*   BILITOTAL  --   --   --   --   --   --  0.9   ALKPHOS  --   --   --   --   --   --  71   AST  --   --   --   --   --   --  23   ALT  --   --   --   --   --   --  10    < > = values in this interval not displayed.     No results for input(s): NTBNPI, NTBNP in the last 168 hours.  Recent Labs   Lab 06/17/23  1217   INR 1.28*     Recent Labs   Lab 06/17/23  1217   LACT 1.7       Recent Results (from the past 24 hour(s))   NM Lung Scan Perfusion Particulate    Narrative    EXAM: NM LUNG SCAN PERFUSION PARTICULATE  LOCATION: [facility]  DATE: 6/22/2023    INDICATION: Shortness of breath.  COMPARISON: Nuclear medicine ventilation/perfusion scan 04/07/2022.  TECHNIQUE: 8.04 mCi technetium-99m MAA, IV. Standard lung perfusion imaging.    FINDINGS: Scattered small subsegmental perfusion defects, left greater than right, which are slightly improved compared to prior exam from 04/07/2022. Findings suggest improving chronic perfusion  abnormalities.        Impression    IMPRESSION:     Persistent although improved chronic perfusion abnormalities compared to 2022.   XR Chest 2 Views    Narrative    EXAM: XR CHEST 2 VIEWS  LOCATION: [facility]  DATE: 2023    INDICATION: COPD exacerbation. Pneumonia.  COMPARISON: 2023, 2023      Impression    IMPRESSION: Left basilar opacity slightly improved from the rib films 2 days ago. Trace left pleural effusion. These findings are consistent with evolving pneumonia.    Stable upper normal heart size. Stable left and right upper chest scarring.    Old bilateral rib fractures. Stable lower thoracic severe vertebral body compression fracture compared to CT 2023.   Echocardiogram Complete   Result Value    LVEF  20-25% (severely reduced)    Narrative    [id number]  [id number]  [id number]  [id number]^GONZALO^[place] [address]     Name: JUDY ZARCO  MRN: [id number]  : 1945  Study Date: 2023 01:19 PM  Age: 78 yrs  Gender: Female  Patient Location: [facility]  Reason For Study: Chest Pain  Ordering Physician: PATO SÁNCHEZ  Performed By: TITUS     BSA: 1.5 m2  Height: 59 in  Weight: 117 lb  HR: 63  BP: 130/72 mmHg  ______________________________________________________________________________  Procedure  Complete Echo Adult. Definity (NDC #91428-892) given intravenously.  Technically difficult study. No hemodynamically significant valvular  abnormalities on 2D or color flow imaging. Compared to the prior study of  22 there has been a decline in left ventricular systolic function.  ______________________________________________________________________________  Interpretation Summary     The left ventricle is normal in size with mild concentric left ventricular  hypertrophy.  Left ventricular function is decreased. The ejection fraction is 20-25%  (severely reduced). There is moderate  global hypokinesia of the left  ventricle.  Grade III or advanced diastolic dysfunction.  Moderately decreased right ventricular systolic function  The left atrium is mildly dilated.  No hemodynamically significant valvular abnormalities on 2D or color flow  imaging.  Compared to the prior study of 4/8/22 there has been a decline in left  ventricular systolic function.  ______________________________________________________________________________  Left Ventricle  The left ventricle is normal in size. Left ventricular function is decreased.  The ejection fraction is 20-25% (severely reduced). There is mild concentric  left ventricular hypertrophy. Grade III or advanced diastolic dysfunction.  There is moderate global hypokinesia of the left ventricle.     Right Ventricle  The right ventricle is normal size. Moderately decreased right ventricular  systolic function.     Atria  The left atrium is mildly dilated. Right atrial size is normal. There is no  color Doppler evidence of an atrial shunt.     Mitral Valve  There is mild mitral annular calcification. There is trace mitral  regurgitation. There is no mitral valve stenosis.     Tricuspid Valve  Tricuspid valve leaflets appear normal. There is no evidence of tricuspid  stenosis or clinically significant tricuspid regurgitation. Right ventricular  systolic pressure could not be approximated due to inadequate tricuspid  regurgitation.     Aortic Valve  The aortic valve is trileaflet. Mild aortic valve calcification is present. No  aortic stenosis is present.     Pulmonic Valve  The pulmonic valve is not well seen, but is grossly normal. This degree of  valvular regurgitation is within normal limits. There is trace pulmonic  valvular regurgitation.     Vessels  The aorta root is normal. Normal size ascending aorta. IVC diameter <2.1 cm  collapsing >50% with sniff suggests a normal RA pressure of 3 mmHg.     Pericardium  There is no pericardial effusion.      ______________________________________________________________________________  MMode/2D Measurements & Calculations  RVDd: 3.9 cm  IVSd: 1.1 cm  LVIDd: 4.0 cm  LVIDs: 3.2 cm  LVPWd: 1.1 cm  FS: 20.0 %     LV mass(C)d: 147.5 grams  LV mass(C)dI: 100.4 grams/m2  Ao root diam: 2.3 cm  LA dimension: 4.1 cm  asc Aorta Diam: 2.6 cm  LA/Ao: 1.8  LVOT diam: 2.0 cm  LVOT area: 3.1 cm2  LA Volume (BP): 39.4 ml  LA Volume Index (BP): 26.8 ml/m2     LA Volume Indexed (AL/bp): 28.7 ml/m2  RWT: 0.53     Doppler Measurements & Calculations  MV E max hossein: 39.8 cm/sec  MV A max hossein: 128.0 cm/sec  MV E/A: 0.31  MV max P.3 mmHg  MV mean P.0 mmHg  MV V2 VTI: 23.5 cm  MVA(VTI): 2.1 cm2  MV P1/2t max hossein: 48.8 cm/sec  MV P1/2t: 59.7 msec  MVA(P1/2t): 3.7 cm2  MV dec slope: 239.5 cm/sec2  MV dec time: 0.17 sec  Ao V2 max: 110.0 cm/sec  Ao max P.0 mmHg  Ao V2 mean: 74.0 cm/sec  Ao mean PG: 3.0 mmHg  Ao V2 VTI: 25.2 cm  CLAUDIA(I,D): 2.0 cm2  CLAUDIA(V,D): 1.8 cm2  LV V1 max P.6 mmHg  LV V1 max: 62.3 cm/sec  LV V1 VTI: 16.0 cm  SV(LVOT): 50.3 ml  SI(LVOT): 34.2 ml/m2  PA V2 max: 93.7 cm/sec  PA max PG: 3.5 mmHg  PA mean P.0 mmHg  PA V2 VTI: 19.3 cm  AV Hossein Ratio (DI): 0.57  CLAUDIA Index (cm2/m2): 1.4     E/E': 10.4  E/E' av.7  Lateral E/e': 10.4  Medial E/e': 13.0  Peak E' Hossein: 3.8 cm/sec     ______________________________________________________________________________  Report approved by: Deepak Moreno 2023 03:56 PM

## 2023-06-24 NOTE — PLAN OF CARE
Problem: Plan of Care - These are the overarching goals to be used throughout the patient stay.    Goal: Plan of Care Review  Description: The Plan of Care Review/Shift note should be completed every shift.  The Outcome Evaluation is a brief statement about your assessment that the patient is improving, declining, or no change.  This information will be displayed automatically on your shift note.  Outcome: Progressing    Goal Outcome Evaluation:    Patient is A & O x 4. Losartan held due to BP parameters. Occasional bradycardic. MD informed. No changes to plan of care. Stable on 4 L of oxygen on shift to maintain > 92 % sats. Scheduled Tramadol provided for back pain. No PRN Flexeril needed today. No Pure Wick use on day shift. Per cardiology, patient switched from IV to PO lasix & Spironolactone added & to stay one more night to follow diuresis. Lungs clear today.  IV is SL. Low fat/Na < 2400. Patient prefers chair. Alarm on for safety. Using the call light appropriately.     Problem: COPD (Chronic Obstructive Pulmonary Disease) Comorbidity  Goal: Maintenance of COPD Symptom Control  Intervention: Maintain COPD-Symptom Control  Recent Flowsheet Documentation  Taken 6/24/2023 0740 by Yocasta Paulino, RN  Medication Review/Management: medications reviewed     Problem: Pain Chronic (Persistent)  Goal: Optimal Pain Control and Function  Intervention: Develop Pain Management Plan  Recent Flowsheet Documentation  Taken 6/24/2023 0740 by Yocasta Paulino, RN  Pain Management Interventions: medication (see MAR)

## 2023-06-24 NOTE — PLAN OF CARE
" Goal Outcome Evaluation:  Pt AO x4. Reported back pain 5/10, PRN tramadol administered. Dyspneic on exertion. NC 4L O2 (baseline). Coarse lung sounds. Congested infrequent cough. Purewick overnight. Slept in the chair. HR dipping to 45 when asleep. Paged MD about it, no changes made, continue to monitor. VSS.       Problem: Plan of Care - These are the overarching goals to be used throughout the patient stay.    Goal: Plan of Care Review  Description: The Plan of Care Review/Shift note should be completed every shift.  The Outcome Evaluation is a brief statement about your assessment that the patient is improving, declining, or no change.  This information will be displayed automatically on your shift note.  Outcome: Progressing  Goal: Patient-Specific Goal (Individualized)  Description: You can add care plan individualizations to a care plan. Examples of Individualization might be:  \"Parent requests to be called daily at 9am for status\", \"I have a hard time hearing out of my right ear\", or \"Do not touch me to wake me up as it startles me\".  Outcome: Progressing  Goal: Absence of Hospital-Acquired Illness or Injury  Outcome: Progressing  Intervention: Identify and Manage Fall Risk  Recent Flowsheet Documentation  Taken 6/24/2023 0313 by Brinda Ayon, RN  Safety Promotion/Fall Prevention: safety round/check completed  Goal: Readiness for Transition of Care  Outcome: Progressing     Problem: Risk for Delirium  Goal: Optimal Coping  Outcome: Progressing  Goal: Improved Behavioral Control  Outcome: Progressing  Goal: Improved Attention and Thought Clarity  Outcome: Progressing     Problem: COPD (Chronic Obstructive Pulmonary Disease) Comorbidity  Goal: Maintenance of COPD Symptom Control  Outcome: Progressing     Problem: Pain Chronic (Persistent)  Goal: Optimal Pain Control and Function  Outcome: Progressing     Problem: Pneumonia  Goal: Effective Oxygenation and Ventilation  Outcome: Progressing               "

## 2023-06-24 NOTE — PROGRESS NOTES
[facility]    Medicine Progress Note - Hospitalist Service    Date of Admission:  6/17/2023    Assessment & Plan     Irene León is 78 year old female with advanced COPD on 2 to 4 L at home, hypertension, nonischemic HFmrEF and breast cancer status post radiation on maintenance tamoxifan who presented to the hospital 6/17/23 after an episode of confusion.    On presentation, she had mild fluid overload with elevated BNP.  Urinalysis was negative for any infection.  CT chest abdomen pelvis showed new patchy infiltrate bibasilarly possibly pneumonia- normal PCT- she was started on vancomycin and Zosyn as the source of infection was unclear.   MRA and MRI brain on 6/18: no acute abnormalities, chronic left PCA distribution cortical infarct.    Antibiotics discontinued Blood culture x 2 NGTD    Current problems include:    Acute metabolic encephalopathy  Mild cognitive impairment  Visual hallucinations         -No recurrence likely 2/2 acute illness         -Had visual hallucinations 6/17         -Undergoing PT OT evaluations         -Therapies recommend home with home care services    Chest and arm pain  HFmrEF         -V/Q negative for PE         -Troponins negative, echo EF reduced to 20 to 25%         -Cardiology consulted         -Continue diuretics/ given 24 hours IV         - plan outpatient EP consult for BiV         - possible eventual coronary angio         - chest wall pain likely MSK    Possible bibasilar pneumonia (?) resolved         -BC NGTD         - PCT was normal at admission and no fevers WBC          -MRSA precautions discontinued/swab negative         - completed 7 days abx     COPD baseline severe  Oxygen dependent  Chronic hypoxic respiratory failure         -Currently baseline continue inhalers nebs         -Continue Acapella 4 times daily with nebs         -Pulmonary consulted, also regarding lymph nodes on CT         - finishing short course prednisone         -  continues home ICS/ LABA/LAMA         - 7 days total Augmentin (prophyaxis) done 6/24/2023     Anemia         -Nonbleeding, trending      Diet: Combination Diet Low Saturated Fat Na <2400mg Diet; adding caffeine back today    DVT Prophylaxis: was on pneumatic Compression Devices; on eliquis  Hayes Catheter: PRESENT, indication: Other (Comment) (for imagaing procedures)  Lines: None     Cardiac Monitoring: None  Code Status: Full Code                Diet: Combination Diet Low Saturated Fat Na <2400mg Diet  Snacks/Supplements Adult: Ensure Enlive; Between Meals    DVT Prophylaxis: DOAC  Hayes Catheter: Not present  Lines: None     Cardiac Monitoring: None  Code Status: Full Code      Clinically Significant Risk Factors              # Hypoalbuminemia: Lowest albumin = 3.2 g/dL at 6/17/2023 12:17 PM, will monitor as appropriate     # Hypertension: Noted on problem list  # Acute heart failure with reduced ejection fraction: last echo with EF <40% and receiving IV diuretics                Disposition Plan      Discharge home with home care services 6/23/23  Narendra Terrazas MD  Hospitalist Service  [facility]  Securely message with [employer] (more info)  Text page via [employer] Paging/Directory   ______________________________________________________________________    Interval History         Physical Exam   Vital Signs: Temp: 97.5  F (36.4  C) Temp src: Oral BP: 99/58 Pulse: 64   Resp: 16 SpO2: 98 % O2 Device: Nasal cannula Oxygen Delivery: 4 LPM  Weight: 116 lbs 12.8 oz    GEN:  Awake, appears comfortable and more alert this am  Mild conversational dyspnea and mild costal retractions bilaterally - but, overall, improved to me  HEENT:  Normocephalic/atraumatic, no scleral icterus, no nasal discharge, mouth appears moist  CV:  Regular rate and rhythm, no clear loud murmur or JVD.  S1 + S2 noted, no S3 or S4.  LUNGS:  Min dry crackles ant/lat bilaterally.  No significant rhonchi/wheezing. Symmetric  chest rise on inhalation noted.  ABD:  Active bowel sounds, soft, non-tender/not reatlly distended.  No rebound/guarding/rigidity.  EXT:  Chronic venous discoloration changes to pretibial area with 1+ pitting pretibial edema bilaterally.  Scattered scabbed areas that are healing.  Small hematoma noted again on the top of the left foot - no change.  PSYCH:  Mood appropriate, more interactive today    Medications       amoxicillin-clavulanate  1 tablet Oral Q12H VY (08/20)     fluticasone-vilanterol  1 puff Inhalation Daily    And     umeclidinium  1 puff Inhalation Daily     furosemide  20 mg Intravenous Q8H     gabapentin  600 mg Oral At Bedtime     ipratropium  2 puff Inhalation 4x daily     lidocaine  1-3 patch Transdermal Q24H     lidocaine   Transdermal Q8H VY     losartan  12.5 mg Oral Daily     metoprolol succinate ER  75 mg Oral At Bedtime     mirabegron  25 mg Oral Daily     predniSONE  40 mg Oral Daily     rivaroxaban ANTICOAGULANT  15 mg Oral At Bedtime     simvastatin  40 mg Oral At Bedtime     sodium chloride (PF)  3 mL Intracatheter Q8H     tamoxifen  20 mg Oral Daily at 4 pm     technetium pentetate Tc99m  65-75 millicurie Inhalation Once     traMADol  50 mg Oral BID       Data     Labs and Imaging results below reviewed today.  Recent Labs   Lab 06/23/23  0638 06/21/23  0736 06/20/23  0648 06/19/23  1029 06/18/23  1018   WBC  --   --  10.6 25.8* 25.1*   HGB  --  12.3 10.7* 13.7 13.6   HCT  --   --  34.1* 43.5 42.2   MCV  --   --  89 89 87     --  223 280 250     Recent Labs   Lab 06/23/23  1249 06/23/23  0638 06/22/23  0742 06/21/23  0736   NA  --  140 141 142   POTASSIUM 4.1 3.4* 4.0 3.8   CHLORIDE  --  104 106 108*   CO2  --  28 29 26   ANIONGAP  --  8 6 8   GLC  --  86 84 83   BUN  --  17 19 20   CR  --  1.07 1.35* 1.37*   GFRESTIMATED  --  53* 40* 39*   MESSI  --  8.9 9.2 8.3*     7-Day Micro Results     Collected Updated Procedure Result Status      06/21/2023 1826 06/21/2023 2156 MRSA  MSSA PCR, Nasal Swab [[id number]]    Swab from Nares, Bilateral    Final result Component Value   MRSA Target DNA Negative   SA Target DNA Negative            06/17/2023 1342 06/22/2023 1501 Blood Culture Peripheral Blood [[id number]]   Peripheral Blood    Final result Component Value   Culture No Growth               06/17/2023 1326 06/22/2023 1501 Blood Culture Peripheral Blood [[id number]]   Peripheral Blood    Final result Component Value   Culture No Growth               06/17/2023 1326 06/17/2023 1413 Symptomatic Influenza A/B, RSV, & SARS-CoV2 PCR (COVID-19) Nasopharyngeal [[id number]]    Swab from Nasopharyngeal    Final result Component Value   Influenza A PCR Negative   Influenza B PCR Negative   RSV PCR Negative   SARS CoV2 PCR Negative   NEGATIVE: SARS-CoV-2 (COVID-19) RNA not detected, presumed negative.                Recent Labs   Lab 06/23/23  1249 06/23/23  0638 06/22/23  0742 06/21/23  0736 06/18/23  0711 06/17/23  1217   NA  --  140 141 142   < > 136   POTASSIUM 4.1 3.4* 4.0 3.8   < > 3.9   CHLORIDE  --  104 106 108*   < > 94*   CO2  --  28 29 26   < > 29   ANIONGAP  --  8 6 8   < > 13   GLC  --  86 84 83   < > 93   BUN  --  17 19 20   < > 21   CR  --  1.07 1.35* 1.37*   < > 1.94*   GFRESTIMATED  --  53* 40* 39*   < > 26*   MESSI  --  8.9 9.2 8.3*   < > 9.8   MAG  --  1.9 2.0 2.0   < > 2.1   PROTTOTAL  --   --   --   --   --  7.7   ALBUMIN  --   --   --   --   --  3.2*   BILITOTAL  --   --   --   --   --  0.9   ALKPHOS  --   --   --   --   --  71   AST  --   --   --   --   --  23   ALT  --   --   --   --   --  10    < > = values in this interval not displayed.     No results for input(s): NTBNPI, NTBNP in the last 168 hours.  Recent Labs   Lab 06/17/23  1217   INR 1.28*     Recent Labs   Lab 06/17/23  1217   LACT 1.7       No results found for this or any previous visit (from the past 24 hour(s)).

## 2023-06-24 NOTE — PROGRESS NOTES
Occupational Therapy Discharge Summary    Reason for therapy discharge:    All goals and outcomes met, no further needs identified.    Progress towards therapy goal(s). See goals on Care Plan in [facility] electronic health record for goal details.  Goals met    Therapy recommendation(s):    Continue home exercise program.

## 2023-06-24 NOTE — PROGRESS NOTES
Cardiology Progress Note    Assessment:  1.  Heart failure with reduced ejection fraction.  Historically felt to be nonischemic.  Minimal coronary artery disease on coronary angiography in 2003 with a negative nuclear stress test in 2016 but no more recent evaluation.  Significant underlying COPD.  Cardiogram this admission suggest ejection fraction to be lower at 20 to 25% with an underlying left bundle branch block.  BNP on admission 1167 and repeat yesterday at the time of my consultation of 1894.  IV furosemide initiated.  Patient has had a net urine output overnight of 1.9 L with a net total of 2.8 L.  Weights has not yet been recorded and will be requested.  No electrolytes have been pleated yet and will be requested.  Cardiac enzymes are negative.  Echocardiogram personally reviewed and demonstrates ejection fraction around 25% with septal motion consistent with a left bundle branch block.  Creatinine is up today but near baseline.    2.  Atypical chest discomfort earlier in admission.  Troponins have been negative.  Sounds more musculoskeletal.  May benefit from eventual CT angiography to redefine coronary anatomy.    3.  Severe COPD.  CT changes with possible community-acquired pneumonia.  Ongoing dyspnea with exertion.  Likely a combination of heart failure and COPD.  Notes from pulmonary reviewed.  4.  History by report of paroxysmal atrial fibrillation.  Current medications:  Acetaminophen  Augmentin  Flexeril as needed  Lasix placed on hold this morning pending laboratory studies  Gabapentin  Ipratropium  Losartan 12.5 mg daily  Toprol XL 75 mg daily  Myrbetriq  Prednisone  Rivaroxaban  Simvastatin  Principal Problem:    COPD exacerbation (H)  Active Problems:    Leukocytosis, unspecified type    Acute pneumonia      Plan:  1 Check laboratory studies today with additional recommendations regarding furosemide pending the results.  Good response to IV furosemide overnight based on urine output.  2.  May  "benefit from consideration of biventricular pacemaker in the setting of the left bundle branch block.  3.  May benefit from Jardiance or Farxiga this can be further discussed.  4.  Pending labs will consider low-dose spironolactone.  potassium has run low.  5.  Resume losartan with parameters.  The parameter was written is less than 120.  Would propose less than 100 holding the medication.  6.  We will transition back to 40 mg p.o. twice daily of furosemide.  Home dose of furosemide is listed at 20 mg and patient states she was taking 40 mg.    Subjective:   Irene León is seen in follow-up.  Chart record reviewed.  Consult note completed yesterday.  History of severe COPD and historically nonischemic cardiomyopathy with reduced ejection fraction more prominent on the most recent echocardiogram with underlying left bundle branch block.  Increased BNP from admission prompting initiation of IV furosemide yesterday.  She reports feeling better.  We talked about transitioning back to oral furosemide and monitoring for 1 more day.    Objective:   Vital signs in last 24 hours:  VITALS: BP 99/58 (BP Location: Right arm)   Pulse 64   Temp 97.5  F (36.4  C) (Oral)   Resp 16   Ht 1.499 m (4' 11\")   Wt 53 kg (116 lb 12.8 oz)   SpO2 98%   BMI 23.59 kg/m    BMI: Body mass index is 23.59 kg/m .  Wt Readings from Last 3 Encounters:   06/21/23 53 kg (116 lb 12.8 oz)   05/18/23 50.3 kg (111 lb)   04/17/23 49.2 kg (108 lb 8 oz)       Intake/Output Summary (Last 24 hours) at 6/24/2023 0733  Last data filed at 6/24/2023 0031  Gross per 24 hour   Intake 240 ml   Output 2150 ml   Net -1910 ml       Physical Exam:*  Neuro: Pleasant alert speaking in full sentences.   Neck: Jugularenous pressure is mildly increased.   Lungs: Increased breath sounds, improved air exchange from yesterday.   COR: RRR, S4, soft systolic murmur   Abd: nondistended, BS present   Extrem: No significant edema  Neuro: Alert and " oriented    Cardiographics:    ECG: 2023, sinus rhythm with premature atrial complexes, left bundle branch block.  Compared to  PVCs are no longer present.  E Show images for Echocardiogram Complete  Echocardiogram Complete  Order: [id number]   Status: Edited Result - FINAL      Visible to patient: Yes (not seen)      Next appt: Today at 10:30 AM in Physical Therapy (Lizzy Patricio, PT)      0 Result Notes  Details     Reading Physician Reading Date Result Priority   Fernando Clancy MD  [phone] 2023        Narrative & Impression  [id number]  [id number]  [id number]  [id number]^GONZALO^PATO^[place] [address]     Name: JUDY ZARCO  MRN: [id number]  : 1945  Study Date: 2023 01:19 PM  Age: 78 yrs  Gender: Female  Patient Location: [facility]  Reason For Study: Chest Pain  Ordering Physician: PATO SÁNCHEZ  Performed By: TITUS     BSA: 1.5 m2  Height: 59 in  Weight: 117 lb  HR: 63  BP: 130/72 mmHg  ______________________________________________________________________________  Procedure  Complete Echo Adult. Definity (NDC #90311-163) given intravenously.  Technically difficult study. No hemodynamically significant valvular  abnormalities on 2D or color flow imaging. Compared to the prior study of  22 there has been a decline in left ventricular systolic function.  ______________________________________________________________________________  Interpretation Summary     The left ventricle is normal in size with mild concentric left ventricular  hypertrophy.  Left ventricular function is decreased. The ejection fraction is 20-25%  (severely reduced). There is moderate global hypokinesia of the left  ventricle.  Grade III or advanced diastolic dysfunction.  Moderately decreased right ventricular systolic function  The left atrium is mildly dilated.  No hemodynamically significant valvular abnormalities on 2D or color  flow  imaging.  Compared to the prior study of 4/8/22 there has been a decline in left  ventricular systolic function.  ______________________________________________________________________________  Left Ventricle  The left ventricle is normal in size. Left ventricular function is decreased.  The ejection fraction is 20-25% (severely reduced). There is mild concentric  left ventricular hypertrophy. Grade III or advanced diastolic dysfunction.  There is moderate global hypokinesia of the left ventricle.     Right Ventricle  The right ventricle is normal size. Moderately decreased right ventricular  systolic function.     Atria  The left atrium is mildly dilated. Right atrial size is normal. There is no  color Doppler evidence of an atrial shunt.     Mitral Valve  There is mild mitral annular calcification. There is trace mitral  regurgitation. There is no mitral valve stenosis.     Tricuspid Valve  Tricuspid valve leaflets appear normal. There is no evidence of tricuspid  stenosis or clinically significant tricuspid regurgitation. Right ventricular  systolic pressure could not be approximated due to inadequate tricuspid  regurgitation.     Aortic Valve  The aortic valve is trileaflet. Mild aortic valve calcification is present. No  aortic stenosis is present.     Pulmonic Valve  The pulmonic valve is not well seen, but is grossly normal. This degree of  valvular regurgitation is within normal limits. There is trace pulmonic  valvular regurgitation.     Vessels  The aorta root is normal. Normal size ascending aorta. IVC diameter <2.1 cm  collapsing >50% with sniff suggests a normal RA pressure of 3 mmHg.     Pericardium  There is no pericardial effusion.      chocardiogram:     Imaging  Chest x-ray:   Narrative & Impression   EXAM: XR CHEST 2 VIEWS  LOCATION: [facility]  DATE: 6/22/2023     INDICATION: COPD exacerbation. Pneumonia.  COMPARISON: 06/21/2023,  06/17/2023                                                                      IMPRESSION: Left basilar opacity slightly improved from the rib films 2 days ago. Trace left pleural effusion. These findings are consistent with evolving pneumonia.     Stable upper normal heart size. Stable left and right upper chest scarring.     Old bilateral rib fractures. Stable lower thoracic severe vertebral body compression fracture compared to CT 06/17/2023.             Lab Results    Chemistry/lipid CBC Cardiac Enzymes/BNP/TSH/INR   No results for input(s): CHOL, HDL, LDL, TRIG, CHOLHDLRATIO in the last 80334 hours.  No results for input(s): LDL in the last 14708 hours.  Recent Labs   Lab Test 06/23/23  1249 06/23/23  0638   NA  --  140   POTASSIUM 4.1 3.4*   CHLORIDE  --  104   CO2  --  28   GLC  --  86   BUN  --  17   CR  --  1.07   GFRESTIMATED  --  53*   MESSI  --  8.9     Recent Labs   Lab Test 06/23/23  0638 06/22/23  0742 06/21/23  0736   CR 1.07 1.35* 1.37*     No results for input(s): A1C in the last 67282 hours.       Recent Labs   Lab Test 06/23/23  0638 06/21/23  0736 06/20/23  0648   WBC  --   --  10.6   HGB  --  12.3 10.7*   HCT  --   --  34.1*   MCV  --   --  89     --  223     Recent Labs   Lab Test 06/21/23  0736 06/20/23  0648 06/19/23  1029   HGB 12.3 10.7* 13.7    Recent Labs   Lab Test 06/22/23  1444 06/22/23  0742 06/21/23  0736   TROPONINI 0.02 0.02 0.04     Recent Labs   Lab Test 06/23/23  1249 06/17/23  1217 04/11/22  1316   BNP 1,894* 1,167* 1,063*     No results for input(s): TSH in the last 24669 hours.  Recent Labs   Lab Test 06/17/23  1217 02/20/21  2040   INR 1.28* 1.16*

## 2023-06-25 NOTE — PLAN OF CARE
Pt discharged to home with homecare arrangements. Transported by  and son. Sent home on 4L O2 which is her baseline, has her own home oxygen ready in vehicle. Discharge education provided including followup instructions, medication instructions and S/S to watch for. All belongings returned and sent home with pt.

## 2023-06-25 NOTE — PROGRESS NOTES
Care Management Discharge Note    Discharge Date: 06/25/2023       Discharge Disposition: Home, Home Care (return home with added HC services for PT/OT)    Discharge Services:  (return home with added HC services for PT/OT)    Discharge DME:  (nothing new- was on home 02 prior to admit)    Discharge Transportation: family or friend will provide (daughter to transport)    Private pay costs discussed: Not applicable    Education Provided on the Discharge Plan: Yes (Patient/daughter Leelee confirm plan is to return home with added HC services for PT/OT. AVS per bedside RN.)  Persons Notified of Discharge Plans: pt, home care  Patient/Family in Agreement with the Plan: yes (patient and daughter Leelee)    Handoff Referral Completed: Yes    Additional Information:  12:19 PM  Pt medically ready to discharge home with [facility] (PT).  [facility] faxed HC orders.  Family to transport home.        PATRIC Pierson

## 2023-06-25 NOTE — DISCHARGE SUMMARY
[facility]  Hospitalist Discharge Summary      Date of Admission:  6/17/2023  Date of Discharge:  6/25/2023  Discharging Provider: Jacob Campa MD  Discharge Service: Hospitalist Service    Discharge Diagnoses   COPD exacerbation  Hypervolemia    Clinically Significant Risk Factors          Follow-ups Needed After Discharge   Follow-up Appointments     Follow-up and recommended labs and tests       Pulmonary and cardiology follow up arranged.        Follow-up and recommended labs and tests       Pulmonology and cardiology follow up arranged.            Unresulted Labs Ordered in the Past 30 Days of this Admission     No orders found from 5/18/2023 to 6/18/2023.          Discharge Disposition   Discharged to home  Condition at discharge: Stable    Hospital Course   Irene León is 78 year old female with advanced COPD on 2 to 4 L at home, hypertension, nonischemic HFmrEF and breast cancer status post radiation on maintenance tamoxifan who presented to the hospital 6/17/23 after an episode of confusion found to be have CAP/COPD exacerbation and volume overloaded. She was seen by both cardiology and pulmonology while in house. Mental status and respiratory status back at baseline on discharge date.     Possible bibasilar pneumonia (?) resolved  COPD baseline severe  Oxygen dependent  Chronic hypoxic respiratory failure  Inpatient and outpatient pulmonology notes reviewed. Respiratory status is at baseline. There will be no changes to her outpatient inhalers.          - Prednisone 40mg for another 2 days.          - Continues home ICS/ LABA/LAMA   - Continue with Trelegy at bedtime.    - Continue with Ipratropium Q6H   - Continue with albuterol prn         - S/p 7 days total Augmentin (prophyaxis) done 6/24/2023          - Pulm follow up 7/25.     Chest and arm pain  HFmrEF. V/Q negative for PE. Troponins negative, echo EF reduced to 20 to 25%          - Cardiology consulted          - Lasix  increased to 40mg BID as outpatient.    - Spironolactone 12.5mg QDAY (new on discharge)           - Outpatient EP consult for BiV      Acute metabolic encephalopathy  Mild cognitive impairment  Visual hallucinations         -No recurrence likely 2/2 acute illness         -Had visual hallucinations 6/17         -Undergoing PT OT evaluations         -Therapies recommend home with home care services    Consultations This Hospital Stay   PHARMACY TO DOSE VANCO  PHYSICAL THERAPY ADULT IP CONSULT  OCCUPATIONAL THERAPY ADULT IP CONSULT  CARE MANAGEMENT / SOCIAL WORK IP CONSULT  IP RESPIRATORY CARE CHRONIC PULMONARY DISEASE SPECIALIST  NUTRITION SERVICES ADULT IP CONSULT  OCCUPATIONAL THERAPY ADULT IP CONSULT  PULMONARY IP CONSULT  PHYSICAL THERAPY ADULT IP CONSULT  OCCUPATIONAL THERAPY ADULT IP CONSULT  CARE MANAGEMENT / SOCIAL WORK IP CONSULT  CARDIOLOGY IP CONSULT    Code Status   Full Code    Time Spent on this Encounter   IJacob MD, personally saw the patient today and spent greater than 30 minutes discharging this patient.       Jacob Campa MD  [address] [address]  Phone: [phone]  Fax: [phone]  ______________________________________________________________________    Physical Exam   Vital Signs: Temp: 97.7  F (36.5  C) Temp src: Oral BP: 126/63 Pulse: 70   Resp: 20 SpO2: 93 % O2 Device: Nasal cannula Oxygen Delivery: 4 LPM  Weight: 116 lbs 12.8 oz    General: Frail looking but NAD.  Eyes: Sclera anicteric/injected  Ears/nose/mouth/throat: mucus membranes moist, hearing intact  Neck: supple, thyroid normal size  CV: No edema  Respiratory: Unlabored breathing  Lymph: No axillary, submandibular, supraclavicular or inguinal lymphadenopathy  Abd:  Nondistended, +bs, no hepatosplenomegaly, nontender, no peritoneal signs  Skin: warm, perfused, no jaundice  Psych: Normal affect  MSK: Normal gait         Primary Care Physician   Pankaj CRUZ  Tarik    Discharge Orders      Pulmonary Rehab Referral      Adult Pulmonary Medicine Referral      Home Care Referral      Primary Care - Care Coordination Referral      Reason for your hospital stay    Admitted for fluid overload, COPD exacerbation.     Follow-up and recommended labs and tests     Pulmonary and cardiology follow up arranged.     Activity    Your activity upon discharge: activity as tolerated     Reason for your hospital stay    COPD exacerbation, Hypervolemia.     Follow-up and recommended labs and tests     Pulmonology and cardiology follow up arranged.     Activity    Your activity upon discharge: activity as tolerated     Diet    Follow this diet upon discharge: Orders Placed This Encounter      Snacks/Supplements Adult: Ensure Enlive; Between Meals      Combination Diet Low Saturated Fat Na <2400mg Diet     Diet    Follow this diet upon discharge: Orders Placed This Encounter      Snacks/Supplements Adult: Ensure Enlive; Between Meals      Combination Diet Low Saturated Fat Na <2400mg Diet      Diet       Significant Results and Procedures       Discharge Medications   Current Discharge Medication List      START taking these medications    Details   gabapentin (NEURONTIN) 600 MG tablet Take 1 tablet (600 mg) by mouth At Bedtime for 30 days  Qty: 30 tablet, Refills: 0    Associated Diagnoses: Exacerbation of intermittent asthma, unspecified asthma severity      spironolactone (ALDACTONE) 25 MG tablet Take 0.5 tablets (12.5 mg) by mouth At Bedtime for 60 days  Qty: 15 tablet, Refills: 1    Associated Diagnoses: Heart failure with reduced ejection fraction (H)         CONTINUE these medications which have CHANGED    Details   furosemide (LASIX) 40 MG tablet Take 1 tablet (40 mg) by mouth 2 times daily for 30 days  Qty: 60 tablet, Refills: 0    Associated Diagnoses: Heart failure with reduced ejection fraction (H)      predniSONE (DELTASONE) 20 MG tablet Take 2 tablets (40 mg) by mouth daily for  2 days  Qty: 4 tablet, Refills: 0    Associated Diagnoses: Exacerbation of intermittent asthma, unspecified asthma severity         CONTINUE these medications which have NOT CHANGED    Details   acetaminophen (TYLENOL) 500 MG tablet Take 1,000 mg by mouth every 8 hours as needed for mild pain or fever       CALCIUM-MAGNESIUM-ZINC PO Take 1 tablet by mouth daily      cetirizine (ZYRTEC) 10 MG tablet Take 10 mg by mouth daily as needed for allergies      chlorhexidine (PERIDEX) 0.12 % solution [CHLORHEXIDINE (PERIDEX) 0.12 % SOLUTION] Apply 15 mL to the mouth or throat at bedtime as needed.       Fluticasone-Umeclidin-Vilanterol (TRELEGY ELLIPTA) 200-62.5-25 MCG/INH oral inhaler Inhale 1 puff into the lungs At Bedtime      HYDROmorphone, STANDARD CONC, (DILAUDID) 1 MG/ML oral solution Take 1 mL (1 mg) by mouth 3 times daily  Qty: 90 mL, Refills: 0    Comments: Please dispense with a 2 ml syringe  Associated Diagnoses: T12 compression fracture, sequela      ipratropium (ATROVENT HFA) 17 MCG/ACT inhaler [ATROVENT HFA 17 MCG/ACTUATION INHALER] USE 2 INHALATIONS EVERY 6 HOURS  Qty: 77.4 g, Refills: 3    Associated Diagnoses: Chronic obstructive pulmonary disease, unspecified COPD type (H)      melatonin 5 MG tablet Take 1 tablet (5 mg) by mouth At Bedtime  Qty: 30 tablet, Refills: 0    Associated Diagnoses: COPD exacerbation (H); Pulmonary emphysema, unspecified emphysema type (H)      metoprolol succinate ER (TOPROL XL) 50 MG 24 hr tablet Take 1.5 tablets (75 mg) by mouth At Bedtime  Qty: 135 tablet, Refills: 1    Associated Diagnoses: Essential hypertension      mirabegron (MYRBETRIQ) 50 MG 24 hr tablet Take 1 tablet (50 mg) by mouth daily  Qty: 90 tablet, Refills: 1    Associated Diagnoses: Urinary frequency      multivitamin w/minerals (THERA-VIT-M) tablet Take 1 tablet by mouth daily      potassium chloride ER (K-TAB/KLOR-CON) 10 MEQ CR tablet Take 1 tablet by mouth daily      rivaroxaban ANTICOAGULANT (XARELTO) 15  MG TABS tablet Take 1 tablet (15 mg) by mouth At Bedtime      simvastatin (ZOCOR) 40 MG tablet Take 1 tablet (40 mg) by mouth At Bedtime  Qty: 90 tablet, Refills: 3    Associated Diagnoses: Hypercholesterolemia      solifenacin (VESICARE) 5 MG tablet Take 1 tablet (5 mg) by mouth daily  Qty: 90 tablet, Refills: 1    Associated Diagnoses: Urinary frequency      tamoxifen (NOLVADEX) 20 MG tablet TAKE 1 TABLET DAILY  Qty: 90 tablet, Refills: 3    Comments: YOUR PATIENT HAS REQUESTED A REFILL OF THIS MEDICATION, PREVIOUSLY AUTHORIZED BY ANOTHER PRESCRIBER.  Associated Diagnoses: Malignant neoplasm of central portion of left breast in female, estrogen receptor positive (H)      traMADol (ULTRAM) 50 MG tablet Take 50 mg by mouth 2 times daily      vitamin D3 (CHOLECALCIFEROL) 50 mcg (2000 units) tablet Take 1 tablet by mouth daily      acetylcysteine (MUCOMYST) 10 % nebulizer solution Inhale 4 mLs into the lungs every 4 hours      albuterol (PROVENTIL) (2.5 MG/3ML) 0.083% neb solution Take 1 vial (2.5 mg) by nebulization every 2 hours as needed for shortness of breath / dyspnea  Qty: 90 mL, Refills: 0    Associated Diagnoses: COPD exacerbation (H); Pulmonary emphysema, unspecified emphysema type (H)      famotidine (PEPCID) 20 MG tablet Take 1 tablet (20 mg) by mouth At Bedtime  Qty: 90 tablet, Refills: 3    Associated Diagnoses: COPD exacerbation (H); Pulmonary emphysema, unspecified emphysema type (H)         STOP taking these medications       albuterol (PROAIR HFA/PROVENTIL HFA/VENTOLIN HFA) 108 (90 Base) MCG/ACT inhaler Comments:   Reason for Stopping:         BREZTRI AEROSPHERE 160-9-4.8 MCG/ACT AERO inhaler Comments:   Reason for Stopping:         fluticasone (FLONASE) 50 MCG/ACT nasal spray Comments:   Reason for Stopping:         gabapentin (NEURONTIN) 300 MG capsule Comments:   Reason for Stopping:         ipratropium - albuterol 0.5 mg/2.5 mg/3 mL (DUONEB) 0.5-2.5 (3) MG/3ML neb solution Comments:   Reason for  Stopping:         ipratropium - albuterol 0.5 mg/2.5 mg/3 mL (DUONEB) 0.5-2.5 (3) MG/3ML neb solution Comments:   Reason for Stopping:             Allergies   Allergies   Allergen Reactions     Sulfa (Sulfonamide Antibiotics) [Sulfa Antibiotics] Rash     Headaches and dizziness.     Homeopathic Drugs [External Allergen Needs Reconciliation - See Comment] Unknown and Other (See Comments)     Comment: runny nose, Comment: runny nose     Mold Extracts [Molds & Smuts] Unknown     Morphine (Pf) [Morphine] Unknown     hallucinate     Lisinopril Cough, Unknown and Itching     Comment: cough, Comment: cough     Sulfacetamide Sodium [Sulfacetamide] Rash

## 2023-06-25 NOTE — PLAN OF CARE
" Goal Outcome Evaluation:  Pt AO x4. Reported back pain 4/10, PRN tramadol administered. Dyspneic on exertion. NC 4L O2 (baseline). Clear lung sounds. Congested infrequent cough. Purewick overnight. Slept in the chair. VSS.         Problem: Plan of Care - These are the overarching goals to be used throughout the patient stay.    Goal: Plan of Care Review  Description: The Plan of Care Review/Shift note should be completed every shift.  The Outcome Evaluation is a brief statement about your assessment that the patient is improving, declining, or no change.  This information will be displayed automatically on your shift note.  Outcome: Progressing  Goal: Patient-Specific Goal (Individualized)  Description: You can add care plan individualizations to a care plan. Examples of Individualization might be:  \"Parent requests to be called daily at 9am for status\", \"I have a hard time hearing out of my right ear\", or \"Do not touch me to wake me up as it startles me\".  Outcome: Progressing  Goal: Absence of Hospital-Acquired Illness or Injury  Outcome: Progressing  Intervention: Identify and Manage Fall Risk  Recent Flowsheet Documentation  Taken 6/25/2023 0312 by Brinda Ayon, RN  Safety Promotion/Fall Prevention: safety round/check completed  Taken 6/24/2023 2300 by Brinda Ayon, RN  Safety Promotion/Fall Prevention: safety round/check completed  Taken 6/24/2023 2049 by Brinda Ayon, RN  Safety Promotion/Fall Prevention: safety round/check completed  Taken 6/24/2023 1900 by Brinda Ayon, RN  Safety Promotion/Fall Prevention: safety round/check completed  Goal: Readiness for Transition of Care  Outcome: Progressing     Problem: Risk for Delirium  Goal: Optimal Coping  Outcome: Progressing  Goal: Improved Behavioral Control  Outcome: Progressing  Goal: Improved Attention and Thought Clarity  Outcome: Progressing     Problem: COPD (Chronic Obstructive Pulmonary Disease) Comorbidity  Goal: Maintenance of COPD " Symptom Control  Outcome: Progressing     Problem: Pain Chronic (Persistent)  Goal: Optimal Pain Control and Function  Outcome: Progressing     Problem: Pneumonia  Goal: Effective Oxygenation and Ventilation  Outcome: Progressing

## 2023-06-25 NOTE — PROGRESS NOTES
Cardiology Progress Note    Assessment:  1.  Heart failure with reduced ejection fraction.  Historically felt to be nonischemic.Minimal coronary artery disease on coronary angiography in 2003 with a negative nuclear stress test in 2016 but no more recent evaluation.  Significant underlying COPD.  Echocardiogram this admission suggested further decline in ejection fraction with reported an ejection fraction of 20 to 25% with underlying left bundle branch block.  BNP on admission 1167.  BNP at the time of my consultation was higher at 1894.  IV furosemide initiated on Friday good urine output and transition to oral Lasix yesterday.  Patient reported at her home dose of 40 mg p.o. daily which was increased to 40 mg p.o. twice daily yesterday.  Net urine output of 2944 cc.  Yesterday's net only 100 cc with transition to oral Lasix.  Weight not recorded again.Spironolactone lactone added to the medication regimen yesterday.  Awaiting blood work this morning.  Patient states she is feeling very well and very much wants to go home.  We will arrange a follow-up in the near future with the CHF clinic and I have written a note to both the CHF clinic nurse practitioner, her primary cardiologist Dr. Chilel as well as her primary physician.  She is aware of the higher risk of serious heart rhythm issues in the setting of LV dysfunction.    2.  Atypical chest discomfort earlier in this admission.  Troponins have been negative.  Symptoms sounded more musculoskeletal.  May benefit from coronary CT angiography to redefine coronary anatomy.  Patient wants to hold on this pending follow-up in the CHF clinic.    3.  Severe COPD.  CT changes with possible community-acquired pneumonia.  Dyspnea likely multifactorial related to heart failure and COPD.  Notes from pulmonary reviewed.  Possible pneumonia.  Notes from primary care and pulmonary reviewed.  Pulmonary describes left-sided PNA.  Pulmonary also discussed right lung nodule.  She  "reportedly has an appointment with her pulmonologist in the near future.    4.  History of paroxysmal atrial fibrillation.  On rivaroxaban 15 mg daily.  Principal Problem:    COPD exacerbation (H)  Active Problems:    Leukocytosis, unspecified type    Acute pneumonia      Plan:  1..  Reviewed laboratory results from this morning.  2.  Requested patient be weighed today.  3.  May benefit from consideration of biventricular pacemaker in the setting of left bundle branch block.  Will refer to CHF clinic in EP clinic to discuss.  4.  May benefit from Jardiance or farxiga.  Need cost evaluation.  This can be completed in the clinic.  5.  Losartan resumed yesterday.  6.  Patient will need close follow-up of electrolytes with initiation of spironolactone and some underlying renal insufficiency close follow-up both in primary care clinic and CHF clinic.    Addendum.  Lab results are reviewed today.  Potassium is mildly low at 3.4 recently started on spironolactone in addition to the losartan.  She did miss a few days of losartan per chart review.  Recommend rechecking electrolytes soon next week.  Magnesium within normal limits.      Current medications:  Acetaminophen  Augmentin  Flexeril as needed  Lasix 40 mg p.o. twice daily  Gabapentin  Ipratropium  Losartan 12.5 mg daily  Toprol XL 75 mg daily  Myrbetriq  Prednisone  Rivaroxaban  Simvastatin  Spironolactone 12.5 mg daily  Subjective:   Irene León is seen in follow-up.  Chart notes reviewed.    Objective:   Vital signs in last 24 hours:  VITALS: /72 (BP Location: Right arm, Patient Position: Sitting, Cuff Size: Adult Regular)   Pulse 69   Temp 97.6  F (36.4  C) (Oral)   Resp 20   Ht 1.499 m (4' 11\")   Wt 53 kg (116 lb 12.8 oz)   SpO2 96%   BMI 23.59 kg/m    BMI: Body mass index is 23.59 kg/m .  Wt Readings from Last 3 Encounters:   06/21/23 53 kg (116 lb 12.8 oz)   05/18/23 50.3 kg (111 lb)   04/17/23 49.2 kg (108 lb 8 oz)       Intake/Output Summary " (Last 24 hours) at 2023 0720  Last data filed at 2023 2100  Gross per 24 hour   Intake 600 ml   Output 700 ml   Net -100 ml       Physical Exam:  General: Alert, no acute distress she states that she is feeling better.   Neck: Mild increase in jugular venous pressure.   Lungs: He is a cannula oxygen in place, lungs diminished bilaterally.   COR: RRR, soft systolic murmur, regular with extrasystoles.   Abd: nondistended, BS present   Extrem: Chronic venous stasis changes, trace edema    Cardiographics:    ECG: 2023, sinus rhythm with premature atrial complexes, left bundle branch block.  Compared to  PVCs are no longer present.  E Show images for Echocardiogram Complete  Echocardiogram Complete  Order: [id number]   Status: Edited Result - FINAL      Visible to patient: Yes (not seen)      Next appt: Today at 10:30 AM in Physical Therapy (Lizzy Patricio, PT)      0 Result Notes  Details     Reading Physician Reading Date Result Priority   Fernando Clancy MD  [phone] 2023        Narrative & Impression  [id number]  [id number]  [id number]  [id number]^GONZALO^PATO^[place] [address]     Name: JUDY ZARCO  MRN: [id number]  : 1945  Study Date: 2023 01:19 PM  Age: 78 yrs  Gender: Female  Patient [facility]: [facility]  Reason For Study: Chest Pain  Ordering Physician: PATO SÁNCHEZ  Performed By: TITUS     BSA: 1.5 m2  Height: 59 in  Weight: 117 lb  HR: 63  BP: 130/72 mmHg  ______________________________________________________________________________  Procedure  Complete Echo Adult. Definity (NDC #06964-197) given intravenously.  Technically difficult study. No hemodynamically significant valvular  abnormalities on 2D or color flow imaging. Compared to the prior study of  22 there has been a decline in left ventricular systolic  function.  ______________________________________________________________________________  Interpretation Summary     The left ventricle is normal in size with mild concentric left ventricular  hypertrophy.  Left ventricular function is decreased. The ejection fraction is 20-25%  (severely reduced). There is moderate global hypokinesia of the left  ventricle.  Grade III or advanced diastolic dysfunction.  Moderately decreased right ventricular systolic function  The left atrium is mildly dilated.  No hemodynamically significant valvular abnormalities on 2D or color flow  imaging.  Compared to the prior study of 4/8/22 there has been a decline in left  ventricular systolic function.  ______________________________________________________________________________  Left Ventricle  The left ventricle is normal in size. Left ventricular function is decreased.  The ejection fraction is 20-25% (severely reduced). There is mild concentric  left ventricular hypertrophy. Grade III or advanced diastolic dysfunction.  There is moderate global hypokinesia of the left ventricle.     Right Ventricle  The right ventricle is normal size. Moderately decreased right ventricular  systolic function.     Atria  The left atrium is mildly dilated. Right atrial size is normal. There is no  color Doppler evidence of an atrial shunt.     Mitral Valve  There is mild mitral annular calcification. There is trace mitral  regurgitation. There is no mitral valve stenosis.     Tricuspid Valve  Tricuspid valve leaflets appear normal. There is no evidence of tricuspid  stenosis or clinically significant tricuspid regurgitation. Right ventricular  systolic pressure could not be approximated due to inadequate tricuspid  regurgitation.     Aortic Valve  The aortic valve is trileaflet. Mild aortic valve calcification is present. No  aortic stenosis is present.     Pulmonic Valve  The pulmonic valve is not well seen, but is grossly normal. This degree  of  valvular regurgitation is within normal limits. There is trace pulmonic  valvular regurgitation.     Vessels  The aorta root is normal. Normal size ascending aorta. IVC diameter <2.1 cm  collapsing >50% with sniff suggests a normal RA pressure of 3 mmHg.     Pericardium  There is no pericardial effusion.      chocardiogram:     Imaging  Chest x-ray:   Narrative & Impression   EXAM: XR CHEST 2 VIEWS  LOCATION: [facility]  DATE: 6/22/2023     INDICATION: COPD exacerbation. Pneumonia.  COMPARISON: 06/21/2023, 06/17/2023                                                                      IMPRESSION: Left basilar opacity slightly improved from the rib films 2 days ago. Trace left pleural effusion. These findings are consistent with evolving pneumonia.     Stable upper normal heart size. Stable left and right upper chest scarring.     Old bilateral rib fractures. Stable lower thoracic severe vertebral body compression fracture compared to CT 06/17/2023.          Lab Results    Chemistry/lipid CBC Cardiac Enzymes/BNP/TSH/INR   No results for input(s): CHOL, HDL, LDL, TRIG, CHOLHDLRATIO in the last 53975 hours.  No results for input(s): LDL in the last 33125 hours.  Recent Labs   Lab Test 06/24/23  0751      POTASSIUM 3.8   CHLORIDE 100   CO2 30   GLC 88   BUN 22   CR 1.31*   GFRESTIMATED 42*   MESSI 9.9     Recent Labs   Lab Test 06/24/23  0751 06/23/23  0638 06/22/23  0742   CR 1.31* 1.07 1.35*     No results for input(s): A1C in the last 09817 hours.       Recent Labs   Lab Test 06/23/23  0638 06/21/23  0736 06/20/23  0648   WBC  --   --  10.6   HGB  --  12.3 10.7*   HCT  --   --  34.1*   MCV  --   --  89     --  223     Recent Labs   Lab Test 06/21/23  0736 06/20/23  0648 06/19/23  1029   HGB 12.3 10.7* 13.7    Recent Labs   Lab Test 06/22/23  1444 06/22/23  0742 06/21/23  0736   TROPONINI 0.02 0.02 0.04     Recent Labs   Lab Test 06/23/23  1249 06/17/23  1217 04/11/22  1316    BNP 1,894* 1,167* 1,063*     No results for input(s): TSH in the last 98976 hours.  Recent Labs   Lab Test 06/17/23  1217 02/20/21  2040   INR 1.28* 1.16*

## 2023-06-26 NOTE — TELEPHONE ENCOUNTER
----- Message from Emir Heath MD sent at 6/25/2023 10:38 AM CDT -----  I saw this patient in the hospital.  Patient with CHF and further reduction in LV function.  EF now 20 to 25% with left bundle branch block.  Spironolactone was added to her medication regimen and her furosemide dose was increased to 40 mg p.o. twice daily.  He is arrange the following from cardiovascular standpoint.    1.  Basic metabolic profile within the next few days as well as magnesium.  He is arranged secondary to initiation of spironolactone.  2.  CHF follow-up clinic appointment in the next 3 to 5 days.  3.  Follow-up with Dr. Chilel.  This was mentioned in the CHF clinic note from April  in 3 months but has not been scheduled.  4.  EP consultation MD as soon as possible for possible biventricular pacemaker in the setting of declining LV function and left bundle branch block.  5.  We will ask that the CHF team follow-up on question of Vasyl Mistry ? candidate for changing to Entresto although she does run somewhat lower blood pressure and I added spironolactone.  6.  Question reevaluation of underlying coronary disease its been a number of years.  She has significant underlying COPD.  Consideration could be given to repeating Lexiscan nuclear stress test that she had in 2016 her CT angiogram although has some underlying renal insufficiency.  Gaby I was hoping for your input  7.  Dr. Montanez I believe she has an appointment with you coming up in the near future as well    Please send any questions.  Thanks Garrett

## 2023-06-26 NOTE — TELEPHONE ENCOUNTER
Spoke with Stephanie, doing okay, has diarrhea, no questions for me to day. Reminded when we will call again and to call before if there is a question.  Safia Davila, RT, CTTS, Chronic Pulmonary Disease Specialist

## 2023-06-26 NOTE — PROGRESS NOTES
Physical Therapy Discharge Summary    Reason for therapy discharge:    Discharged to home with home therapy.    Progress towards therapy goal(s). See goals on Care Plan in [facility] electronic health record for goal details.  Goals partially met.  Barriers to achieving goals:   limited tolerance for therapy and discharge from facility.    Therapy recommendation(s):    Continued therapy is recommended.  Rationale/Recommendations:  HH PT as needed.

## 2023-06-26 NOTE — PROGRESS NOTES
Clinic Care Coordination Contact  [facility]: Post-Discharge Note  SITUATION                                                      Admission:    Admission Date: 06/17/23   Reason for Admission: Altered Mental Status  Discharge:   Discharge Date: 06/25/23  Discharge Diagnosis: COPD exacerbation  Hypervolemia    BACKGROUND                                                      Per hospital discharge summary and inpatient provider notes:  Irene León is 78 year old female with advanced COPD on 2 to 4 L at home, hypertension, nonischemic HFmrEF and breast cancer status post radiation on maintenance tamoxifan who presented to the hospital 6/17/23 after an episode of confusion found to be have CAP/COPD exacerbation and volume overloaded. She was seen by both cardiology and pulmonology while in house. Mental status and respiratory status back at baseline on discharge date.     ASSESSMENT           Discharge Assessment  How are you doing now that you are home?: I've been having diarrhea all day.   It's been running through me.  Stephanie stating she's been taking pepto bismol.  She thinks she has Immodium in the house.  She is going to take this instead.  Writer instructed for her to call back if this worsens or doesn't improve.  Gave her this RN's direct phone number.  How are your symptoms? (Red Flag symptoms escalate to triage hotline per guidelines): Other (see note above)  Do you feel your condition is stable enough to be safe at home until your provider visit?: Yes  Does the patient have their discharge instructions? : Yes  Does the patient have questions regarding their discharge instructions? : No  Were you started on any new medications or were there changes to any of your previous medications? : Yes  Does the patient have all of their medications?: Yes  Do you have questions regarding any of your medications? : No  Do you have all of your needed medical supplies or equipment (DME)?  (i.e. oxygen tank, CPAP,  cane, etc.): Yes  Discharge follow-up appointment scheduled within 14 calendar days? : Yes  Discharge Follow Up Appointment Date: 06/28/23  Discharge Follow Up Appointment Scheduled with?: Specialty Care Provider    Post-op ([facility] CTA Only)  If the patient had a surgery or procedure, do they have any questions for a nurse?: No    Post-op (Clinicians Only)  Did the patient have surgery or a procedure: No  Fever: No  Chills: No  Eating & Drinking:  (decreased appetite)  PO Intake: regular diet  Additional Symptoms: decreased appetite  Bowel Function:  (diarrhea)  Date of last BM: 06/26/23  Urinary Status: voiding without complaint/concerns    Spoke with Stephanie.  See note above.  She stated she has been having diarrhea today.  Was able to eat peanut butter toast this morning.  She is currently taking Pepto Bismol.  She stated she believes she has Imodium in the house and will take this instead.  Writer instructed for her to call the clinic or this RN directly if symptoms were to worsen.  Gave her my direct call back number.  She stated an understanding.    PLAN                                                      Outpatient Plan:  Confirmed appointments listed below.    Future Appointments   Date Time Provider Department Center   6/27/2023  3:20 PM [facility]   6/30/2023 10:30 AM [facility]   7/12/2023  9:20 AM [facility]   7/25/2023 10:30 AM [facility] Larson MD [facility]         For any urgent concerns, please contact our 24 hour nurse triage line: [phone] ([phone])         Anushka Morales RN

## 2023-06-26 NOTE — Clinical Note
See post hospital note related to new complaint of diarrhea.  Patient to call back CCC RN or clinic directly if symptoms were to worsen.
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
## 2023-06-27 NOTE — PATIENT INSTRUCTIONS
It was good to see you today, Stephanie.    Here are the things we talked about:  Do a sitz bath with warm water and 1 tsp per quart of water after BMs and up to three times a day  Pat dry or sit on a towel to gently dry your bottom  Apply the nystatin cream three times a day until you feel better and then for one more day.  If no improvement or worsening on Friday go to urgent care    Someone from the team will reach out to schedule a follow up appointment in 6-8 weeks.    [place] Medical Cannabis program--call them to get set up for it.     How to get a hold of us:  For non-urgent matters, MyChart works best.    For more urgent matters, or if you prefer not to use MyChart, call our clinic nurse coordinator Anabela Hillman RN at [phone]    We have an on-call number for evenings and weekends. Please call this only if you are having uncontrolled symptoms or serious side effects from your medicines: [phone].     For refills, please give us a week (5 working days) notice. We don't always have providers available everyday to do refills. If you call the day you run out of your medicine, we may not be able to refill it in time, so call 5 days in advance!    Tobi Miguel MD MS FAAFP CAQHPM  [facility]  Office [phone]  Fax [phone]

## 2023-06-27 NOTE — NURSING NOTE
Is the patient currently in the state of [place]? YES    Visit mode:VIDEO    If the visit is dropped, the patient can be reconnected by: VIDEO VISIT: Text to cell phone: [phone]    Will anyone else be joining the visit? NO      How would you like to obtain your AVS? MyChart    Are changes needed to the allergy or medication list? NO    Unable to complete distress screening due to time constraint.     Reason for visit: ELENA Cristina, Virtual Facilitator

## 2023-06-28 NOTE — TELEPHONE ENCOUNTER
Spoke with Stephanie, doing not so well as she still has diarrhea, and yeast infection, no questions for me to day. no issues getting and/or taking their medications, reviewed their action plan. Reminded when we will call again and to call before if there is a question.  Safia Davila, RT, CTTS, Chronic Pulmonary Disease Specialist

## 2023-06-28 NOTE — TELEPHONE ENCOUNTER
Received call from pt requesting refill of tessalon perles. Medication no longer active on her profile, will add in.    Last office visit: 6/27/23  Scheduled for follow up per check out request     Will route request to MD for review.     Reviewed [place]  Report.

## 2023-06-29 NOTE — TELEPHONE ENCOUNTER
Noted per 6-26-23,  's made note in orders that patient would call back to arrange appts which has not occurred.      Msg sent to sched with request to make another attempt to arrange all appts per Dr. Heath's dischg orders.  mg

## 2023-06-29 NOTE — TELEPHONE ENCOUNTER
Lab appt sched on 7-3-23 - follow-up with HF NP sched on 7-24-23 - follow-up with Dr. Chilel pending scheduling.    Please clarify timing for follow-up with you and need for any further testing.  mg

## 2023-06-29 NOTE — TELEPHONE ENCOUNTER
----- Message from Gaby Chilel MD sent at 6/28/2023  4:40 PM CDT -----  I guess I would favor some type of angiogram to make certain we did not do something with the stress test in the past.  We could attempt a CT coronary angiogram although she does have moderate renal insufficiency.  The other option would be direct angiography.    Gaby    ----- Message -----  From: Emir Heath MD  Sent: 6/25/2023  10:43 AM CDT  To: Pankaj Montanez MD; Mallory Lauren RN; #    I saw this patient in the hospital.  Patient with CHF and further reduction in LV function.  EF now 20 to 25% with left bundle branch block.  Spironolactone was added to her medication regimen and her furosemide dose was increased to 40 mg p.o. twice daily.  He is arrange the following from cardiovascular standpoint.    1.  Basic metabolic profile within the next few days as well as magnesium.  He is arranged secondary to initiation of spironolactone.  2.  CHF follow-up clinic appointment in the next 3 to 5 days.  3.  Follow-up with Dr. Chilel.  This was mentioned in the CHF clinic note from April  in 3 months but has not been scheduled.  4.  EP consultation MD as soon as possible for possible biventricular pacemaker in the setting of declining LV function and left bundle branch block.  5.  We will ask that the CHF team follow-up on question of Jardiance,Farxiga ? candidate for changing to Entresto although she does run somewhat lower blood pressure and I added spironolactone.  6.  Question reevaluation of underlying coronary disease its been a number of years.  She has significant underlying COPD.  Consideration could be given to repeating Lexiscan nuclear stress test that she had in 2016 her CT angiogram although has some underlying renal insufficiency.  Gaby I was hoping for your input  7.  Dr. Montanez I believe she has an appointment with you coming up in the near future as well    Please send any questions.  Thanks Garrett

## 2023-06-30 NOTE — TELEPHONE ENCOUNTER
Home Care is calling regarding an established patient with [facility].     Requesting orders from: Pankaj Montanez  Provider is following patient: Yes  Is this a 60-day recertification request?  No    Orders Requested    Physical Therapy  Request for delay in care, service is not able to be provided within same scheduled day. Delay in start of care for PT until 7/5/23 per patient preference.        Information was gathered and will be sent to provider for review.  RN will contact Home Care with information after provider review.  Confirmed ok to leave a detailed message with call back.  Contact information confirmed and updated as needed.    Sarai Gaston RN

## 2023-06-30 NOTE — TELEPHONE ENCOUNTER
Msg rec'd 6-30-23 @ 0542:  Gaby Chilel MD Gorshe, Maureen, NATE  I would suggest a direct coronary angiogram to see if significant CAD.     CASSANDRAL

## 2023-06-30 NOTE — PROGRESS NOTES
"Oncology Rooming Note    June 30, 2023 10:47 AM   Irene León is a 78 year old female who presents for:    Chief Complaint   Patient presents with     Oncology Clinic Visit     Invasive ductal carcinoma of breast, female, left     Initial Vitals: There were no vitals taken for this visit. Estimated body mass index is 22.66 kg/m  as calculated from the following:    Height as of 6/17/23: 1.499 m (4' 11\").    Weight as of 6/25/23: 50.9 kg (112 lb 3.2 oz). There is no height or weight on file to calculate BSA.  Data Unavailable Comment: Data Unavailable   No LMP recorded. Patient is postmenopausal.  Allergies reviewed: No  Medications reviewed: No    Medications: Medication refills not needed today.  Pharmacy name entered into [facility]:    [id number] [place], [place] - [facility] [facility]  AT [facility] - [place], [place] - [address] [place] [address] - [place], [place] - [address] [facility]    Clinical concerns: 1 year follow up with prior CT. Patient was already on video visit with  when called.        Alia Anne MA            "

## 2023-06-30 NOTE — PROGRESS NOTES
[facility] Hematology and Oncology Progress Note    Patient: Irene León  MRN: [id number]  Date of Service: Jun 30, 2023         Stephanie is a 78 year old who is being evaluated via a billable video visit.      Video-Visit Details    Type of service:  Video Visit   Video Start Time: 10:35 AM  Video End Time: 10:53 AM    Originating Location (pt. Location): Home    Distant Location (provider location):  On-site  Platform used for Video Visit: [employer]  Reason for Visit    Chief Complaint   Patient presents with     Oncology Clinic Visit     Invasive ductal carcinoma of breast, female, left       Assessment and Plan     Cancer Staging   Invasive ductal carcinoma of breast, female, left (H)  Staging form: Breast, AJCC 8th Edition  - Pathologic stage from 6/20/2017: Stage IA (pT1b, pN0(sn), cM0, G1, ER+, HI+, HER2-, Oncotype DX score: 6) - Signed by Devon Suarez MD on 7/4/2022      ECOG Performance    2 - Ambulatory and independent in all ADLs; cannot work; up > 50% of the time     Pain       #.  History of invasive ductal carcinoma of the left breast.   Stage IA (pT1b, pN0 (sn),cM0). grade 1, associated DCIS, ER/HI positive, HER-2 negative, s/p left breast lumpectomy and left axillary sentinel lymph node biopsy. Right breast atypical ductal hyperplasia s/p right breast excisional biopsy on 6/20/2017.  She has several comorbidities including nonischemic cardiomyopathy (EF 40% in 3/17), osteoporosis on treatment with oral bisphosphonate, factor V Leiden mutation (details unknown). Started Tamoxifen in 12/2017.   Diagnostic mammogram for concern of left breast mass was benign.     She has excellent tolerance to tamoxifen.  She completed 5 years of tamoxifen on 12/2022, but she is interested in extended therapy due to overall excellent tolerance to it.  I discussed that there is no data to support the benefit of extended endocrine therapy in her clinical circumstances, however it might potentially benefit her and  just we do not have a data to prove that.  She understands the potential side effects of tamoxifen.  She is comfortable continuing tamoxifen at this point for extended therapy.   Follow-up clinical exam with me in 1 year.   Continue annual screening mammogram and next due in 6/2024.    #.  Chronic hypoxic respiratory failure, on supplemental oxygen  #.  Indeterminate right upper lobe lobe nodular opacity of about 1-1.2 cm     She had a CT scan in April 2023 and then 2 weeks ago.  It appeared that the right upper lobe nodule opacity has slightly increased in size.  She is going to have a follow-up CT scan in July.  There are adjacent cluster of new nodules of measuring 8 mm and patchy consolidative and hips base opacity suggesting inflammatory or infectious, however malignancy is not entirely ruled out.      Plan:  -Continue tamoxifen for extended duration since she tolerates well without any side effects and potential benefit from tamoxifen.  -Follow-up clinical exam in 1 year for adjuvant therapy monitoring for breast cancer.  -Will follow-up CT scan for her chest.  I requested her to send me a copy since she will be completing at [facility] through Dr. Dietrich.    Encounter Diagnoses:    Problem List Items Addressed This Visit        Oncology Diagnoses    Invasive ductal carcinoma of breast, female, left (H) - Primary    Malignant neoplasm of central portion of left female breast (H)    Relevant Medications    tamoxifen (NOLVADEX) 20 MG tablet   Other Visit Diagnoses     Right upper lobe pulmonary nodule                 CC: Pankaj Montanez MD   ______________________________________________________________________________  Diagnosis  6/20/17  Stage IA (pT1b, pN0(sn), cM0) invasive ductal carcinoma of the left breast  - ER (high positive, 98%), WV (high positive, 97%) HER2 (negative, score of 1+ by IHC)  - [place] grade 1  - Tumor size: <1 cm  - Margin-negative on final margin with reexcision for invasive carcinoma  but close margin for DCIS of 0.2 cm from new medial margin.    - 1 sentinel lymph node removed, negative for carcinoma  - associated DCIS  - Right breast atypical ductal hyperplasia.    - Oncotype DX recurrence score 6 : 10 year risk of recurrence with 5 year of tamoxifen is 5%.    Therapy to date:  6/20/17 -right breast excisional biopsy AND left breast lumpectomy, left axillary sentinel lymph node biopsy  6/30/17-reexcision lumpectomy of the left breast  9/8/17-completed adjuvant radiation to the left breast 4256 cGy/16  12/6/17-initiated adjuvant endocrine therapy with tamoxifen.    Comorbidities  #.  Mild hypoxia and chronic cough.   She is closely follow with pulmonologist at [facility].    #. Hypertension, controlled.  #.  Osteoporosis  #.  COPD  #.  Idiopathic cardiomyopathy- LVEF 40% in October 2018, no change from prior.      History of Present Illness    Ms. Irene León presented today accompanied by her daughter for yearly follow-up.    She was hospitalized recently with COPD exacerbation.  During the hospitalization, CT scan of the chest showed right upper lobe nodular opacity.  She will be follow-up on that.  She told me that she continued tamoxifen beyond December 2022 since she feels it gives her sense of security.  She does not have any intolerable side effects.    Review of systems  Apart from describing in HPI, the remainder of comprehensive ROS was negative.    Past History    Past Medical History:   Diagnosis Date     ANATOLIY (acute kidney injury) (H)      Allergic rhinitis      Arrhythmia      Atrial fibrillation with RVR (H)      Bacteremia      Breast cancer (H) 2017     Cardiomyopathy (H)      Centrilobular emphysema (H)      CHF (congestive heart failure) (H)      CKD (chronic kidney disease)      COPD (chronic obstructive pulmonary disease) (H)      Coronary artery disease      Depression      Dysphagia      E. coli sepsis (H)      Factor 5 Leiden mutation, heterozygous (H)      GERD  (gastroesophageal reflux disease)      Heart failure with reduced ejection fraction (H) 4/17/2023     Hx of radiation therapy 2017     Hyperlipidemia      Hypertension      Hypokalemia      Hypomagnesemia      Idiopathic cardiomyopathy (H)      Left hip pain 4/30/2014     OAB (overactive bladder)      Osteoporosis      Sacral insufficiency fracture      Sinusitis      Syncope      Urge incontinence      Vocal cord dysfunction        Past Surgical History:   Procedure Laterality Date     ARTHROPLASTY REVISION HIP Left      BIOPSY BREAST Right 2017     BLADDER SURGERY      bladder interstim with removal     CARDIAC CATHETERIZATION       CATARACT EXTRACTION Left 07/18/2017     IR ABDOMINAL AORTOGRAM  4/16/2003     IR AORTIC ARCH 4 VESSEL ANGIOGRAM  4/16/2003     IR MISCELLANEOUS PROCEDURE  4/16/2003     LUMPECTOMY BREAST Left 06/2017    x2     PICC DOUBLE LUMEN PLACEMENT  4/11/2022          PICC TRIPLE LUMEN PLACEMENT  4/7/2022          ZZC OPEN TX FEMORAL FRACTURE DISTAL MED/LAT CONDYLE Left 10/28/2015    Procedure: OPEN REDUCTION INTERNAL FIXATION LEFT  PROXIMAL FEMUR PERIPROSTHETIC FRACTURE;  Surgeon: Yovanny Albarran MD;  Location: [facility];  Service: Orthopedics       Physical Exam    There were no vitals taken for this visit.    General: alert, awake, not in acute distress.  Very thin and cachectic.    She wears supplemental oxygen.  CNS: non focal.  Compensating well.    Lab Results    Recent Results (from the past 168 hour(s))   Basic metabolic panel   Result Value Ref Range    Sodium 142 136 - 145 mmol/L    Potassium 3.8 3.5 - 5.0 mmol/L    Chloride 100 98 - 107 mmol/L    Carbon Dioxide (CO2) 30 22 - 31 mmol/L    Anion Gap 12 5 - 18 mmol/L    Urea Nitrogen 22 8 - 28 mg/dL    Creatinine 1.31 (H) 0.60 - 1.10 mg/dL    Calcium 9.9 8.5 - 10.5 mg/dL    Glucose 88 70 - 125 mg/dL    GFR Estimate 42 (L) >60 mL/min/1.73m2   Magnesium   Result Value Ref Range    Magnesium 2.3 1.8 - 2.6 mg/dL   Magnesium    Result Value Ref Range    Magnesium 2.0 1.8 - 2.6 mg/dL   Basic metabolic panel   Result Value Ref Range    Sodium 141 136 - 145 mmol/L    Potassium 3.4 (L) 3.5 - 5.0 mmol/L    Chloride 99 98 - 107 mmol/L    Carbon Dioxide (CO2) 29 22 - 31 mmol/L    Anion Gap 13 5 - 18 mmol/L    Urea Nitrogen 29 (H) 8 - 28 mg/dL    Creatinine 1.29 (H) 0.60 - 1.10 mg/dL    Calcium 9.4 8.5 - 10.5 mg/dL    Glucose 100 70 - 125 mg/dL    GFR Estimate 42 (L) >60 mL/min/1.73m2       Imaging    MA Diagnostic Bilateral w/Nash    Result Date: 6/26/2023  EXAM: MA DIAGNOSTIC BILATERAL W/ NASH AND LEFT BREAST ULTRASOUND LOCATION: [facility] DATE: 6/26/2023 INDICATION: left breast mass or rib COMPARISON: 06/27/2022, 06/10/2021, 05/29/2020, 05/13/2019 MAMMOGRAPHIC FINDINGS: Bilateral full-field digital diagnostic mammograms performed. The breasts are heterogeneously dense, which may obscure small masses. Images evaluated with the assistance of CAD. Breast tomosynthesis was used in interpretation. Changes of breast conservation are again noted on the left. There are scattered benign-appearing round calcifications in both breasts. There are no findings to suggest malignancy. There is been no significant change compared to prior exams. ULTRASOUND FINDINGS: Targeted left breast ultrasound was performed by both ultrasonographer and the radiologist in the location of the palpable lump as identified by the patient, in the low axilla near the mid axillary line, in the 2:00 position. No focal abnormalities were identified.     IMPRESSION: 1.  No findings to suggest malignancy. No cause for the patient's lump is identified and clinical follow-up is recommended for all palpable abnormalities. Findings consistent with patient's history of left breast conservation therapy. ACR BI-RADS Category 2: Benign. Return to annual screening schedule is recommended. The findings and the recommendations were discussed with the patient at the  time of exam.     US Breast Left Limited 1-3 Quadrants    Result Date: 2023  EXAM: MA DIAGNOSTIC BILATERAL W/ NASH AND LEFT BREAST ULTRASOUND LOCATION: [facility] DATE: 2023 INDICATION: left breast mass or rib COMPARISON: 2022, 06/10/2021, 2020, 2019 MAMMOGRAPHIC FINDINGS: Bilateral full-field digital diagnostic mammograms performed. The breasts are heterogeneously dense, which may obscure small masses. Images evaluated with the assistance of CAD. Breast tomosynthesis was used in interpretation. Changes of breast conservation are again noted on the left. There are scattered benign-appearing round calcifications in both breasts. There are no findings to suggest malignancy. There is been no significant change compared to prior exams. ULTRASOUND FINDINGS: Targeted left breast ultrasound was performed by both ultrasonographer and the radiologist in the location of the palpable lump as identified by the patient, in the low axilla near the mid axillary line, in the 2:00 position. No focal abnormalities were identified.     IMPRESSION: 1.  No findings to suggest malignancy. No cause for the patient's lump is identified and clinical follow-up is recommended for all palpable abnormalities. Findings consistent with patient's history of left breast conservation therapy. ACR BI-RADS Category 2: Benign. Return to annual screening schedule is recommended. The findings and the recommendations were discussed with the patient at the time of exam.     Echocardiogram Complete    Result Date: 2023  [id number]^GONZALO^[place] [address]  Name: JUDY ZARCO MRN: [id number] : 1945 Study Date: 2023 01:19 PM Age: 78 yrs Gender: Female Patient Location: [facility] Reason For Study: Chest Pain Ordering Physician: PATO SÁNCHEZ Performed By: TITUS  BSA: 1.5 m2 Height: 59 in Weight: 117  lb HR: 63 BP: 130/72 mmHg ______________________________________________________________________________ Procedure Complete Echo Adult. Definity (NDC #78644-457) given intravenously. Technically difficult study. No hemodynamically significant valvular abnormalities on 2D or color flow imaging. Compared to the prior study of 4/8/22 there has been a decline in left ventricular systolic function. ______________________________________________________________________________ Interpretation Summary  The left ventricle is normal in size with mild concentric left ventricular hypertrophy. Left ventricular function is decreased. The ejection fraction is 20-25% (severely reduced). There is moderate global hypokinesia of the left ventricle. Grade III or advanced diastolic dysfunction. Moderately decreased right ventricular systolic function The left atrium is mildly dilated. No hemodynamically significant valvular abnormalities on 2D or color flow imaging. Compared to the prior study of 4/8/22 there has been a decline in left ventricular systolic function. ______________________________________________________________________________ Left Ventricle The left ventricle is normal in size. Left ventricular function is decreased. The ejection fraction is 20-25% (severely reduced). There is mild concentric left ventricular hypertrophy. Grade III or advanced diastolic dysfunction. There is moderate global hypokinesia of the left ventricle.  Right Ventricle The right ventricle is normal size. Moderately decreased right ventricular systolic function.  Atria The left atrium is mildly dilated. Right atrial size is normal. There is no color Doppler evidence of an atrial shunt.  Mitral Valve There is mild mitral annular calcification. There is trace mitral regurgitation. There is no mitral valve stenosis.  Tricuspid Valve Tricuspid valve leaflets appear normal. There is no evidence of tricuspid stenosis or clinically significant tricuspid  regurgitation. Right ventricular systolic pressure could not be approximated due to inadequate tricuspid regurgitation.  Aortic Valve The aortic valve is trileaflet. Mild aortic valve calcification is present. No aortic stenosis is present.  Pulmonic Valve The pulmonic valve is not well seen, but is grossly normal. This degree of valvular regurgitation is within normal limits. There is trace pulmonic valvular regurgitation.  Vessels The aorta root is normal. Normal size ascending aorta. IVC diameter <2.1 cm collapsing >50% with sniff suggests a normal RA pressure of 3 mmHg.  Pericardium There is no pericardial effusion.  ______________________________________________________________________________ MMode/2D Measurements & Calculations RVDd: 3.9 cm IVSd: 1.1 cm LVIDd: 4.0 cm LVIDs: 3.2 cm LVPWd: 1.1 cm FS: 20.0 %  LV mass(C)d: 147.5 grams LV mass(C)dI: 100.4 grams/m2 Ao root diam: 2.3 cm LA dimension: 4.1 cm asc Aorta Diam: 2.6 cm LA/Ao: 1.8 LVOT diam: 2.0 cm LVOT area: 3.1 cm2 LA Volume (BP): 39.4 ml LA Volume Index (BP): 26.8 ml/m2  LA Volume Indexed (AL/bp): 28.7 ml/m2 RWT: 0.53  Doppler Measurements & Calculations MV E max hossein: 39.8 cm/sec MV A max hossein: 128.0 cm/sec MV E/A: 0.31 MV max P.3 mmHg MV mean P.0 mmHg MV V2 VTI: 23.5 cm MVA(VTI): 2.1 cm2 MV P1/2t max hossein: 48.8 cm/sec MV P1/2t: 59.7 msec MVA(P1/2t): 3.7 cm2 MV dec slope: 239.5 cm/sec2 MV dec time: 0.17 sec Ao V2 max: 110.0 cm/sec Ao max P.0 mmHg Ao V2 mean: 74.0 cm/sec Ao mean PG: 3.0 mmHg Ao V2 VTI: 25.2 cm CLAUDIA(I,D): 2.0 cm2 CLAUDIA(V,D): 1.8 cm2 LV V1 max P.6 mmHg LV V1 max: 62.3 cm/sec LV V1 VTI: 16.0 cm SV(LVOT): 50.3 ml SI(LVOT): 34.2 ml/m2 PA V2 max: 93.7 cm/sec PA max PG: 3.5 mmHg PA mean P.0 mmHg PA V2 VTI: 19.3 cm AV Hossein Ratio (DI): 0.57 CLAUDIA Index (cm2/m2): 1.4  E/E': 10.4 E/E' av.7 Lateral E/e': 10.4 Medial E/e': 13.0 Peak E' Hossein: 3.8 cm/sec  ______________________________________________________________________________  Report approved by: Deepak Moreno 06/22/2023 03:56 PM       NM Lung Scan Perfusion Particulate    Result Date: 6/22/2023  EXAM: NM LUNG SCAN PERFUSION PARTICULATE LOCATION: [facility] DATE: 6/22/2023 INDICATION: Shortness of breath. COMPARISON: Nuclear medicine ventilation/perfusion scan 04/07/2022. TECHNIQUE: 8.04 mCi technetium-99m MAA, IV. Standard lung perfusion imaging. FINDINGS: Scattered small subsegmental perfusion defects, left greater than right, which are slightly improved compared to prior exam from 04/07/2022. Findings suggest improving chronic perfusion abnormalities.     IMPRESSION: Persistent although improved chronic perfusion abnormalities compared to 04/07/2022.    XR Chest 2 Views    Result Date: 6/22/2023  EXAM: XR CHEST 2 VIEWS LOCATION: [facility] DATE: 6/22/2023 INDICATION: COPD exacerbation. Pneumonia. COMPARISON: 06/21/2023, 06/17/2023     IMPRESSION: Left basilar opacity slightly improved from the rib films 2 days ago. Trace left pleural effusion. These findings are consistent with evolving pneumonia. Stable upper normal heart size. Stable left and right upper chest scarring. Old bilateral rib fractures. Stable lower thoracic severe vertebral body compression fracture compared to CT 06/17/2023.    XR Ribs Left Port 2 Views    Result Date: 6/21/2023  EXAM: XR RIBS LEFT PORT 2 VIEWS LOCATION: [facility] DATE: 6/21/2023 INDICATION: Left back pain. COMPARISON: 06/17/2023, 04/10/2022     IMPRESSION: No acute rib fracture seen. Left mid chest scarring. Left inferior chest worsening opacity which could represent atelectasis or pneumonia. Stable heart size.    MR Lumbar Spine w/o & w Contrast    Result Date: 6/20/2023  EXAM: MR LUMBAR SPINE W/O and W CONTRAST LOCATION: [facility] DATE: 6/19/2023 INDICATION: Persistent leukocytosis.  History of breast cancer.  Lower back pain for the last  6 months COMPARISON:  CT chest 07/06/2022. CONTRAST: 4.5 mL Gadavist TECHNIQUE: Routine Lumbar Spine MRI without and with IV contrast. FINDINGS: Nomenclature is based on 5 lumbar type vertebral bodies with L5-S1 defined on image 9 of series 8. 31 degrees lumbar dextrocurvature with the apex at L2-L3. Minimal degenerative anterolisthesis of L5 on S1.  Chronic T12 wedge compression deformity with approximately 75% vertebral body height loss anteriorly. Minimal retropulsion of the T12 posterior inferior endplate, unchanged.  No suspicious marrow signal abnormality. Normal distal spinal cord and cauda equina with conus medullaris at L2-L3. No abnormal intrathecal enhancement. Left iliopsoas atrophy. Dorsal paraspinal muscular atrophy, right greater than left. Abdominal aortic ectasia. Incidental bilateral renal cysts. Left hip arthroplasty. T12-L1: Osseous retropulsion. No significant disc herniation. Mild bilateral facet arthropathy. No significant canal or foraminal stenosis. L1-L2: Mild bulge. Severe left eccentric disc height loss. Mild bilateral facet arthropathy. No significant canal or foraminal stenosis. L2-L3: Mild bulge. Severe left eccentric disc height loss. Mild bilateral facet arthropathy. No significant canal or foraminal stenosis. L3-L4: Mild bulge. Moderate disc height loss. Mild bilateral facet arthropathy. No significant canal or foraminal stenosis. L4-L5: Bulge with superimposed right foraminal protrusion. Moderate right eccentric disc height loss. Moderate right and mild left facet arthropathy. Severe right lateral recess stenosis. No significant canal or foraminal stenosis. L5-S1: Anterolisthesis with unroofing of the disc. No significant disc herniation. Moderate disc height loss. Mild to moderate bilateral facet arthropathy. No significant canal or foraminal stenosis.     IMPRESSION: 1.  No lumbar spine findings to explain the patient's leukocytosis. 2.  Multilevel degenerative changes without  high-grade canal or foraminal stenosis. 3.  Severe right L4-L5 lateral recess stenosis. Correlate for right L5 radiculopathy.    MR Foot Left w/o Contrast    Result Date: 6/19/2023  EXAM: MR FOOT LEFT W/O CONTRAST LOCATION: [facility] DATE: 6/19/2023 INDICATION: History of breast cancer, persistent leukocytosis, tenderness to palpation of the dorsal aspect of the left foot. Imaging showed small mass. Further evaluation for any signs of infection or tumor. COMPARISON: CT 6/19/2023. TECHNIQUE: Unenhanced. FINDINGS: JOINTS AND BONES: -Mild degenerative changes most marked at the 1st MTP joint. TENDONS: -No tendon tear, tendinopathy, or tenosynovitis. LIGAMENTS: -Lisfranc ligament: Intact. No subluxation. MUSCLES AND SOFT TISSUES: -In the dorsal aspect of the mid and forefoot regions there is a well-circumscribed serpiginous area of abnormal signal which measures approximately 1.5 x 0.8 x 4.0 cm which is of primarily decreased signal on both T1 and T2-weighted images. This correlates with the same abnormality seen on today's CT examination. This should be atypical for changes of infection or neoplasm. If this area of abnormal soft tissue has been present for a significant period of time, then I would favor changes of a benign entity such as a fibroma or neuroma. If this is relatively acute in etiology then changes such as superficial vein thrombosis should be considered.     IMPRESSION: 1.  I would not favor changes of infection or neoplasm as the etiologies for the area of abnormal serpiginous increased soft tissue in the dorsal aspect of the foot. 2.  If the soft tissue is chronic in nature then I would favor benign entities such as a fibroma or neuroma. 3.  If the soft tissue is acute in nature, then I would favor changes such as superficial thrombophlebitis.    CT Foot Left w/o Contrast    Result Date: 6/19/2023  EXAM: CT FOOT LEFT W/O CONTRAST LOCATION: [facility]  DATE: 6/19/2023 INDICATION: Leukocytosis, small fluid collection on the dorsal aspect of the left foot with significant tenderness to palpation. Unclear if there is any trauma. COMPARISON: 3/9/2021. TECHNIQUE: Noncontrast. Axial, sagittal and coronal thin-section reconstruction. Dose reduction techniques were used. FINDINGS: Bones are demineralized. There is a nonspecific area of increased soft tissue along the dorsal aspect of the foot measuring 1.6 x 0.6 cm at the level of the TMT joints. This measures up to 4.0 cm in length. This is not well characterized on noncontrast CT. No evidence of an acute displaced fracture. Muscle bulk is maintained. There is no high-grade joint space narrowing or significant marginal osteophyte formation.     IMPRESSION: 1.  Nonspecific dorsal subcutaneous soft tissue mass along the patella measuring up to 4.0 cm in length. The imaging findings are nonspecific and can be seen with a complex cyst, hematoma/abscess or mass. If further imaging is needed, recommend MRI. 2.  No underlying acute displaced fracture of the foot. 3.  Bone demineralization.     MRA Brain ([place] [facility] [place]) wo Contrast    Result Date: 6/18/2023  EXAM: MR BRAIN W/O CONTRAST, MRA BRAIN ([place] [facility] [place]) W/O CONTRAST LOCATION: [facility] DATE: 6/18/2023 INDICATION: History of breast cancer, presented with significant episodes of confusion, visual hallucination.  No focal motor deficits.  Leukocytosis. COMPARISON: 06/17/2023 TECHNIQUE: 1) Routine multiplanar multisequence head MRI without intravenous contrast. 2) 3D time-of-flight head MRA without intravenous contrast. FINDINGS: HEAD MRI: Evaluation for metastatic disease is limited in the absence of contrast. INTRACRANIAL CONTENTS: No acute or subacute infarct. No mass, acute hemorrhage, or extra-axial fluid collections. Patchy nonspecific T2/FLAIR hyperintensities within the cerebral white matter and jose most consistent with mild to  moderate chronic microvascular ischemic change. There is chronic cortical infarct of the medial left temporal and occipital lobes. Mild generalized cerebral atrophy. No hydrocephalus. Normal position of the cerebellar tonsils. SELLA: No abnormality accounting for technique. OSSEOUS STRUCTURES/SOFT TISSUES: Normal marrow signal. The major intracranial vascular flow voids are maintained. ORBITS: Prior bilateral cataract surgery. Visualized portions of the orbits are otherwise unremarkable. SINUSES/MASTOIDS: There is moderate mucosal thickening of left sphenoid sinus. No middle ear or mastoid effusion. HEAD MRA: ANTERIOR CIRCULATION: No stenosis/occlusion, aneurysm, or high flow vascular malformation. Standard [place] of [place] anatomy. POSTERIOR CIRCULATION: There is gradual tapering and eventual occlusion of the distal left posterior cerebral artery. The right posterior cerebral artery is widely patent. No aneurysm, or high flow vascular malformation. Balanced vertebral arteries supply a normal basilar artery.     IMPRESSION: HEAD MRI: 1.  No acute intracranial process. 2.  Generalized brain atrophy and presumed microvascular ischemic changes as detailed above. 3.  Chronic left PCA distribution cortical infarct. 4.  Left sphenoid sinusitis. HEAD MRA: 1.  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 2.  Otherwise unremarkable MRI without intravenous.    MR Brain w/o Contrast    Result Date: 6/18/2023  EXAM: MR BRAIN W/O CONTRAST, MRA BRAIN ([place] OF [place]) W/O CONTRAST LOCATION: [facility] DATE: 6/18/2023 INDICATION: History of breast cancer, presented with significant episodes of confusion, visual hallucination.  No focal motor deficits.  Leukocytosis. COMPARISON: 06/17/2023 TECHNIQUE: 1) Routine multiplanar multisequence head MRI without intravenous contrast. 2) 3D time-of-flight head MRA without intravenous contrast. FINDINGS: HEAD MRI: Evaluation for  metastatic disease is limited in the absence of contrast. INTRACRANIAL CONTENTS: No acute or subacute infarct. No mass, acute hemorrhage, or extra-axial fluid collections. Patchy nonspecific T2/FLAIR hyperintensities within the cerebral white matter and jose most consistent with mild to moderate chronic microvascular ischemic change. There is chronic cortical infarct of the medial left temporal and occipital lobes. Mild generalized cerebral atrophy. No hydrocephalus. Normal position of the cerebellar tonsils. SELLA: No abnormality accounting for technique. OSSEOUS STRUCTURES/SOFT TISSUES: Normal marrow signal. The major intracranial vascular flow voids are maintained. ORBITS: Prior bilateral cataract surgery. Visualized portions of the orbits are otherwise unremarkable. SINUSES/MASTOIDS: There is moderate mucosal thickening of left sphenoid sinus. No middle ear or mastoid effusion. HEAD MRA: ANTERIOR CIRCULATION: No stenosis/occlusion, aneurysm, or high flow vascular malformation. Standard [place] of [place] anatomy. POSTERIOR CIRCULATION: There is gradual tapering and eventual occlusion of the distal left posterior cerebral artery. The right posterior cerebral artery is widely patent. No aneurysm, or high flow vascular malformation. Balanced vertebral arteries supply a normal basilar artery.     IMPRESSION: HEAD MRI: 1.  No acute intracranial process. 2.  Generalized brain atrophy and presumed microvascular ischemic changes as detailed above. 3.  Chronic left PCA distribution cortical infarct. 4.  Left sphenoid sinusitis. HEAD MRA: 1.  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 2.  Otherwise unremarkable MRI without intravenous.    US Lower Extremity Venous Duplex Left    Result Date: 6/18/2023  EXAM: US LOWER EXTREMITY VENOUS DUPLEX LEFT LOCATION: [facility] DATE: 6/18/2023 INDICATION: Left leg pain. Left foot pain. COMPARISON: None. TECHNIQUE: Venous Duplex  ultrasound of the left lower extremity with and without compression, augmentation and duplex. Color flow and spectral Doppler with waveform analysis performed. FINDINGS: Exam includes the common femoral, femoral, popliteal, and contralateral common femoral veins as well as segmentally visualized deep calf veins and greater saphenous vein. LEFT: No deep vein thrombosis. No superficial thrombophlebitis. No popliteal cyst. There is a subcutaneous collection of fluid in the anterior left foot along the area of pain measuring 4.3 x 0.6 x 2.4 cm.     IMPRESSION: 1.  No deep venous thrombosis in the left lower extremity. 2.  Subcutaneous fluid collection in the anterior left foot along the area of pain measuring 4.3 x 0.6 x 2.4 cm. This is nonspecific and could represent a seroma or hematoma. Infection can be considered if there are related symptoms.     CT Chest w/o Contrast    Result Date: 6/17/2023  EXAM: CT CHEST W/O CONTRAST, CT ABDOMEN PELVIS W/O CONTRAST LOCATION: [facility] DATE: 6/17/2023 INDICATION: Shortness of breath, confusion and leukocytosis. History of COPD. Increased opacities left lung on chest radiograph. Sepsis and acute kidney injury. COMPARISON: 2 view chest 06/17/2023, CT chest 04/10/2023, CT pelvis 10/11/2022, CT chest abdomen pelvis 04/08/2022 TECHNIQUE: CT scan of the chest, abdomen, and pelvis was performed without IV contrast. Multiplanar reformats were obtained. Dose reduction techniques were used. CONTRAST: None. FINDINGS: LUNGS AND PLEURA: Advanced emphysema redemonstrated, scattered subpleural scarring and mild bronchial wall thickening redemonstrated. 12 x 7 mm nodular opacity right upper lobe image 95 series 4 previously measured 10 x 6 mm. Adjacent cluster of new nodules, largest measuring measure 8 mm image 114. New patchy consolidative airspace opacities within the lung bases, greater on the left, compatible with pneumonia. Band of scarring bronchiectasis left  upper lobe along the major fissure unchanged. Central airways are patent. MEDIASTINUM/AXILLAE: No mass/adenopathy nor pericardial effusion. Mild cardiomegaly, overall decreased CORONARY ARTERY CALCIFICATION: Moderate. HEPATOBILIARY: Normal. PANCREAS: Normal. SPLEEN: Normal. ADRENAL GLANDS: Normal. KIDNEYS/BLADDER: Mild bilateral pelvocaliectasis is new compared to prior studies. 2 mm calcification right upper pole is new. No ureteral calculus. The bladder is negative. BOWEL: Diverticulosis of the colon. No acute inflammatory change. No obstruction. Normal appendix. LYMPH NODES: No lymphadenopathy. VASCULATURE: Aortic ectasia with moderate aortoiliac atherosclerosis. No aneurysm. PELVIC ORGANS: Normal. MUSCULOSKELETAL: Generalized demineralization and rightward lumbar scoliosis redemonstrated. Stable chronic T12 compression deformity. No suspicious osseous lesions.     IMPRESSION: 1.  New patchy consolidative airspace opacities within the lung bases, greater on the left, compatible with pneumonia. 2.  Increase in clustered nodular opacities right upper lobe may be infectious/inflammatory. Short interval follow-up CT in 3 months could be performed to reevaluate. 3.  Mild bilateral pelvocaliectasis new compared to prior studies. New 2 mm nonobstructing calculus right upper pole. No ureteral calculus. Consider short interval follow-up renal ultrasound with pre-/post void bladder.     CT Abdomen Pelvis w/o Contrast    Result Date: 6/17/2023  EXAM: CT CHEST W/O CONTRAST, CT ABDOMEN PELVIS W/O CONTRAST LOCATION: [facility] DATE: 6/17/2023 INDICATION: Shortness of breath, confusion and leukocytosis. History of COPD. Increased opacities left lung on chest radiograph. Sepsis and acute kidney injury. COMPARISON: 2 view chest 06/17/2023, CT chest 04/10/2023, CT pelvis 10/11/2022, CT chest abdomen pelvis 04/08/2022 TECHNIQUE: CT scan of the chest, abdomen, and pelvis was performed without IV contrast.  Multiplanar reformats were obtained. Dose reduction techniques were used. CONTRAST: None. FINDINGS: LUNGS AND PLEURA: Advanced emphysema redemonstrated, scattered subpleural scarring and mild bronchial wall thickening redemonstrated. 12 x 7 mm nodular opacity right upper lobe image 95 series 4 previously measured 10 x 6 mm. Adjacent cluster of new nodules, largest measuring measure 8 mm image 114. New patchy consolidative airspace opacities within the lung bases, greater on the left, compatible with pneumonia. Band of scarring bronchiectasis left upper lobe along the major fissure unchanged. Central airways are patent. MEDIASTINUM/AXILLAE: No mass/adenopathy nor pericardial effusion. Mild cardiomegaly, overall decreased CORONARY ARTERY CALCIFICATION: Moderate. HEPATOBILIARY: Normal. PANCREAS: Normal. SPLEEN: Normal. ADRENAL GLANDS: Normal. KIDNEYS/BLADDER: Mild bilateral pelvocaliectasis is new compared to prior studies. 2 mm calcification right upper pole is new. No ureteral calculus. The bladder is negative. BOWEL: Diverticulosis of the colon. No acute inflammatory change. No obstruction. Normal appendix. LYMPH NODES: No lymphadenopathy. VASCULATURE: Aortic ectasia with moderate aortoiliac atherosclerosis. No aneurysm. PELVIC ORGANS: Normal. MUSCULOSKELETAL: Generalized demineralization and rightward lumbar scoliosis redemonstrated. Stable chronic T12 compression deformity. No suspicious osseous lesions.     IMPRESSION: 1.  New patchy consolidative airspace opacities within the lung bases, greater on the left, compatible with pneumonia. 2.  Increase in clustered nodular opacities right upper lobe may be infectious/inflammatory. Short interval follow-up CT in 3 months could be performed to reevaluate. 3.  Mild bilateral pelvocaliectasis new compared to prior studies. New 2 mm nonobstructing calculus right upper pole. No ureteral calculus. Consider short interval follow-up renal ultrasound with pre-/post void  bladder.     Chest XR,  PA & LAT    Result Date: 6/17/2023  EXAM: XR CHEST 2 VIEWS LOCATION: [facility] DATE: 6/17/2023 INDICATION: Hypoxia. COMPARISON: 04/10/2022     IMPRESSION: Mild bandlike opacities in the left upper lung are likely due to scarring from the more extensive infiltrates and consolidation in this location on 04/10/2022. Additional patchy interstitial opacities throughout both lungs are likely due to scarring. No definite new infiltrates. Normal heart size and pulmonary vascularity. Old healed bilateral rib fractures. No significant pleural effusions.    Head CT w/o contrast    Result Date: 6/17/2023  EXAM: CT HEAD W/O CONTRAST LOCATION: [facility] DATE: 6/17/2023 INDICATION: ams COMPARISON: 03/23/2021 TECHNIQUE: Routine CT Head without IV contrast. Multiplanar reformats. Dose reduction techniques were used. FINDINGS: INTRACRANIAL CONTENTS: No intracranial hemorrhage, extraaxial collection, or mass effect.  No CT evidence of acute infarct. Moderate presumed chronic small vessel ischemic changes. Similar chronic cortical infarct of the inferior left temporal occipital lobe. Intracranial atherosclerosis is present. Mild to moderate generalized volume loss. No hydrocephalus. VISUALIZED ORBITS/SINUSES/MASTOIDS: No intraorbital abnormality. No paranasal sinus mucosal disease. No middle ear or mastoid effusion. BONES/SOFT TISSUES: No acute abnormality.     IMPRESSION: 1.  No CT evidence for acute intracranial process. 2.  Brain atrophy and presumed chronic microvascular ischemic changes as above. 3.  Chronic left temporal occipital infarct, similar prior.    40 minutes spent on the date of the encounter doing chart review, history and exam, documentation, communication of the treatment plan with the care team and further activities as noted above.    Signed by: Devon Suarez MD

## 2023-06-30 NOTE — TELEPHONE ENCOUNTER
Phone call to patient - informed her of Dr. Chilel's recommendations, confirmed upcoming appts and addressed pre-procedure questions.    Explained to patient that sched will contact her to arrange procedure date/time, then nurse would contact her to complete procedure education - patient verbalized understanding and agreed with plan.  mg

## 2023-07-03 PROBLEM — J18.9 PNEUMONIA OF LEFT UPPER LOBE DUE TO INFECTIOUS ORGANISM: Status: ACTIVE | Noted: 2023-01-01

## 2023-07-03 PROBLEM — I50.42 CHRONIC COMBINED SYSTOLIC AND DIASTOLIC CONGESTIVE HEART FAILURE (H): Status: ACTIVE | Noted: 2023-01-01

## 2023-07-03 PROBLEM — R60.0 PEDAL EDEMA: Status: ACTIVE | Noted: 2023-01-01

## 2023-07-03 NOTE — PHARMACY-ADMISSION MEDICATION HISTORY
Pharmacist Admission Medication History    Admission medication history is complete. The information provided in this note is only as accurate as the sources available at the time of the update.    Medication reconciliation/reorder completed by provider prior to medication history? No    Information Source(s): Patient and Facility (TCU/NH/) medication list/MAR via in-person    Pertinent Information:      Changes made to PTA medication list:    Added: None    Deleted: None    Changed: Albuterol, Atrovent    Medication Affordability:  Not including over the counter (OTC) medications, was there a time in the past 3 months when you did not take your medications as prescribed because of cost?: No    Allergies reviewed with patient and updates made in EHR: yes    Medication History Completed By: Fritz Hopkins RPH 7/3/2023 5:10 PM    Prior to Admission medications    Medication Sig Last Dose Taking? Auth Provider Long Term End Date   acetaminophen (TYLENOL) 500 MG tablet Take 1,000 mg by mouth every 8 hours as needed for mild pain or fever  Past Week Yes Provider, Historical     albuterol (PROAIR HFA/PROVENTIL HFA/VENTOLIN HFA) 108 (90 Base) MCG/ACT inhaler Inhale 2 puffs into the lungs every 6 hours as needed for shortness of breath, wheezing or cough Past Week at not with Yes Unknown, Entered By History No    benzonatate (TESSALON PERLES) 100 MG capsule Take 1 capsule (100 mg) by mouth 3 times daily as needed for cough Past Week Yes Tobi Miguel MD     CALCIUM-MAGNESIUM-ZINC PO Take 1 tablet by mouth daily 7/2/2023 Yes Unknown, Entered By History     cetirizine (ZYRTEC) 10 MG tablet Take 10 mg by mouth daily as needed for allergies Past Week Yes Reported, Patient     chlorhexidine (PERIDEX) 0.12 % solution [CHLORHEXIDINE (PERIDEX) 0.12 % SOLUTION] Apply 15 mL to the mouth or throat at bedtime as needed.  Past Week Yes Provider, Historical     doxycycline hyclate (VIBRAMYCIN) 100 MG capsule Take 1 capsule (100 mg)  by mouth 2 times daily for 10 days  Yes Kate Neal MD  7/13/23   famotidine (PEPCID) 20 MG tablet Take 1 tablet (20 mg) by mouth At Bedtime 7/2/2023 Yes Pankaj Montanez MD     Fluticasone-Umeclidin-Vilanterol (TRELEGY ELLIPTA) 200-62.5-25 MCG/INH oral inhaler Inhale 1 puff into the lungs At Bedtime 7/2/2023 at not with Yes Unknown, Entered By History     furosemide (LASIX) 40 MG tablet Take 1 tablet (40 mg) by mouth 2 times daily for 30 days 7/2/2023 Yes Jacob Campa MD Yes 7/25/23   gabapentin (NEURONTIN) 600 MG tablet Take 1 tablet (600 mg) by mouth At Bedtime for 30 days 7/2/2023 Yes Jacob Campa MD Yes 7/25/23   HYDROmorphone, STANDARD CONC, (DILAUDID) 1 MG/ML oral solution Take 1 mg by mouth 3 times daily 7/2/2023 Yes Unknown, Entered By History     ipratropium (ATROVENT HFA) 17 MCG/ACT inhaler Inhale 2 puffs into the lungs every 6 hours as needed for wheezing Past Week at has with Yes Unknown, Entered By History No    Melatonin 10 MG TABS tablet Take 10 mg by mouth At Bedtime 7/2/2023 Yes Unknown, Entered By History     metoprolol succinate ER (TOPROL XL) 50 MG 24 hr tablet Take 1.5 tablets (75 mg) by mouth At Bedtime 7/2/2023 Yes Pankaj Montanez MD Yes    mirabegron (MYRBETRIQ) 50 MG 24 hr tablet Take 1 tablet (50 mg) by mouth daily 7/2/2023 Yes Pankaj Montanez MD Yes    multivitamin w/minerals (THERA-VIT-M) tablet Take 1 tablet by mouth daily 7/2/2023 Yes Reported, Patient     nystatin (MYCOSTATIN) 012894 UNIT/GM external cream Apply topically 3 times daily Until clear. Past Week Yes Tobi Miguel MD     potassium chloride ER (K-TAB/KLOR-CON) 10 MEQ CR tablet Take 1 tablet by mouth daily 7/2/2023 Yes Reported, Patient     predniSONE (DELTASONE) 20 MG tablet Take 3 tablets on day 1, then take 2 tablets on days 2 through 4, 1 tablet on day 5-6, half a tablet on day 7, then discontinue  Yes Kate Neal MD No    rivaroxaban ANTICOAGULANT (XARELTO) 15 MG TABS tablet Take 1 tablet (15 mg) by mouth At  Bedtime 7/2/2023 Yes Unknown, Entered By History No    simvastatin (ZOCOR) 40 MG tablet Take 1 tablet (40 mg) by mouth At Bedtime 7/2/2023 Yes Pankaj Montanez MD Yes    solifenacin (VESICARE) 5 MG tablet Take 1 tablet (5 mg) by mouth daily 7/2/2023 Yes Pankaj Montanez MD Yes    spironolactone (ALDACTONE) 25 MG tablet Take 0.5 tablets (12.5 mg) by mouth At Bedtime for 60 days 7/2/2023 Yes Jacob Campa MD Yes 8/24/23   tamoxifen (NOLVADEX) 20 MG tablet Take 1 tablet (20 mg) by mouth daily 7/2/2023 Yes Devon Suarez MD Yes    traMADol (ULTRAM) 50 MG tablet Take 50 mg by mouth 2 times daily 7/2/2023 Yes Unknown, Entered By History     vitamin D3 (CHOLECALCIFEROL) 50 mcg (2000 units) tablet Take 1 tablet by mouth daily 7/2/2023 Yes Unknown, Entered By History

## 2023-07-03 NOTE — H&P
[facility]    History and Physical - Hospitalist Service       Date of Admission:  7/3/2023    Assessment & Plan      Irene León is a 78 year old female admitted on 7/3/2023. She has a pertinent hx of COPD and CHF, recently admitted and discharged with new diuretics lasix 40mg po bid (hospitalized for copd exacerbation with hypevolema 6/25/23 and completed augmetin). She did have diarrhea ; presentation of several days of increased lower extremity swelling and shortness of breath with hypoxia. CXR showed patchy linear and airspace opacities in the left lower lobe have continued to slightly decrease, consistent with resolving left lower lobe pneumonia; initial plan on day of admission was to discharge from ER but pt became hypoxic with persistent symptoms now requiring increased 02 at 4L NC; baseline is 2L. Started on doxycycline and ceftriaxone and admitted. CR 1.93, wbc 18.9, hgb 12.5, bnp 200 (vs 1800 in June).     - - - - -  CAP  Shortness of breath  Acute on chronic hypoxic respiratory failure with increased 02 needs  A- as above, initially set for ER discharge but progressive symptoms and is above baseline 2L and now at 4L. Discussd w/ ER team and pt given doxycycline and would opt to also cover typical organisms in additional to atypicals so will give cephalosporin for coverage as well. cxr was equivocal but clinically with active symptoms. Does not appear volume-up on exam and bnp level down at 200 vs 1800 when volume up previously last month. She is worried about possible diarrhea so will have imodium prn.  P:  - admit to hospital  - continue doxycycline  - continue ceftriaxone  - dueneida scheduled  - 02 and wean as able; baseline 2L  - tessalon, ordered  - consider adding mucinex if more productive  - was given solumedrol in ER; will transition with prednisone 40mg  - imodium prn for any diarrhea     Acute on chronic kidney injury  A: appears baseline around 1-1.2; Cr 1.93 on  admit   - avoid nephrotoxic agents  - recheck in AM     HFmrEF  Recent stay 6/25 with V/Q negative for PE. Troponins were negative, echo 6/23 EF reduced to 20 to 25% Cardiology consulted during stay and discharged with Lasix 40mg BID; also on new Spironolactone 12.5mg daily and was to see Outpatient EP consult for BiV. Does not appear floridly volume up on exam.   P:  - I/O  - weights  - continue same lasix  - if progressive, would ask cardiology to assess as well      Recent hx of metabolic encephalopathy  Mild cognitive impairment  Visual hallucinations  Noted at last hospitalization; Had visual hallucinations 6/17; discharged with home with home care services   - noted, redirect if able  - may require seroquel or zyprex if sundowning overnight    afib  A/p- on xarelto 15mg daily, continue    Left left dorsal lump  A- tender on exam but pt states stable since last month; MR 6/17 showed findings not suspicious for neoplasm or infection but chronic nature would be fibroma or neuroma vs acute nature favored as superficial thrombophlebitis.  P  - monitor clinically, low threshold to re-image if progressive         Diet: Combination Diet Low Saturated Fat Na <2400mg Diet, No Caffeine Diet    DVT Prophylaxis: DOAC, Pneumatic Compression Devices and Ambulate every shift  Hayes Catheter: Not present  Lines: None     Cardiac Monitoring: None  Code Status: Full Code      Clinically Significant Risk Factors Present on Admission                # Drug Induced Coagulation Defect: home medication list includes an anticoagulant medication   # Acute Kidney Injury, unspecified: based on a >150% or 0.3 mg/dL increase in last creatinine compared to past 90 day average, will monitor renal function  # Hypertension: Noted on problem list  # Chronic heart failure with reduced ejection fraction: last echo with EF <40%              Disposition Plan      Expected Discharge Date: 07/05/2023                  Chin Parisi MD  Hospitalist  [facility]  Securely message with [employer] (more info)  Text page via [facility] Paging/Directory     ______________________________________________________________________    Chief Complaint   Shortness of breath     History is obtained from the patient    History of Present Illness   Irene León is a 78 year old female who has a pertinent hx of COPD and CHF, recently admitted and discharged with new diuretics lasix 40mg po bid (hospitalized for copd exacerbation with hypevolema 6/25/23 and completed augmetin). She did have diarrhea ; presentation of several days of increased lower extremity swelling and shortness of breath with hypoxia. CXR showed patchy linear and airspace opacities in the left lower lobe have continued to slightly decrease, consistent with resolving left lower lobe pneumonia; initial plan on day of admission was to discharge from ER but pt became hypoxic with persistent symptoms now requiring increased 02 at 4L NC; baseline is 2L    On patient, patient confirms history as aforementioned, her baseline is usually 2 L but she is currently requiring 4 L and we discussed antibiotics.  She was very worried about possible diarrhea given prior history, we discussed ceftriaxone and having as needed Imodium.  Discussed atypical coverage with doxycycline.  She was agreeable with this plan.  She denies any recent fevers or chills but she does have a cough that is not quite productive but persistent and she feels the phlegm and clearing her throat; some on and off shortness of breath, currently comfortable at increased 4L. She denies any changes with urination or bowel movements, no neurologic or sensory changes or weakness.     She is from [place].  She is full code. Home cares to start on 7/5.     Past Medical History    Past Medical History:   Diagnosis Date     ANATOLIY (acute kidney injury) (H)      Allergic rhinitis      Arrhythmia      Atrial fibrillation with RVR (H)       Bacteremia      Breast cancer (H) 2017     Cardiomyopathy (H)      Centrilobular emphysema (H)      CHF (congestive heart failure) (H)      CKD (chronic kidney disease)      COPD (chronic obstructive pulmonary disease) (H)      Coronary artery disease      Depression      Dysphagia      E. coli sepsis (H)      Factor 5 Leiden mutation, heterozygous (H)      GERD (gastroesophageal reflux disease)      Heart failure with reduced ejection fraction (H) 4/17/2023     Hx of radiation therapy 2017     Hyperlipidemia      Hypertension      Hypokalemia      Hypomagnesemia      Idiopathic cardiomyopathy (H)      Left hip pain 4/30/2014     OAB (overactive bladder)      Osteoporosis      Sacral insufficiency fracture      Sinusitis      Syncope      Urge incontinence      Vocal cord dysfunction        Past Surgical History   Past Surgical History:   Procedure Laterality Date     ARTHROPLASTY REVISION HIP Left      BIOPSY BREAST Right 2017     BLADDER SURGERY      bladder interstim with removal     CARDIAC CATHETERIZATION       CATARACT EXTRACTION Left 07/18/2017     IR ABDOMINAL AORTOGRAM  4/16/2003     IR AORTIC ARCH 4 VESSEL ANGIOGRAM  4/16/2003     IR MISCELLANEOUS PROCEDURE  4/16/2003     LUMPECTOMY BREAST Left 06/2017    x2     PICC DOUBLE LUMEN PLACEMENT  4/11/2022          PICC TRIPLE LUMEN PLACEMENT  4/7/2022          ZZC OPEN TX FEMORAL FRACTURE DISTAL MED/LAT CONDYLE Left 10/28/2015    Procedure: OPEN REDUCTION INTERNAL FIXATION LEFT  PROXIMAL FEMUR PERIPROSTHETIC FRACTURE;  Surgeon: Yovanny Albarran MD;  Location: [facility];  Service: Orthopedics       Prior to Admission Medications   Prior to Admission Medications   Prescriptions Last Dose Informant Patient Reported? Taking?   CALCIUM-MAGNESIUM-ZINC PO 7/2/2023  Yes Yes   Sig: Take 1 tablet by mouth daily   Fluticasone-Umeclidin-Vilanterol (TRELEGY ELLIPTA) 200-62.5-25 MCG/INH oral inhaler 7/2/2023 at not with  Yes Yes   Sig: Inhale 1 puff into the lungs  At Bedtime   HYDROmorphone, STANDARD CONC, (DILAUDID) 1 MG/ML oral solution 7/2/2023  Yes Yes   Sig: Take 1 mg by mouth 3 times daily   Melatonin 10 MG TABS tablet 7/2/2023  Yes Yes   Sig: Take 10 mg by mouth At Bedtime   acetaminophen (TYLENOL) 500 MG tablet Past Week  Yes Yes   Sig: Take 1,000 mg by mouth every 8 hours as needed for mild pain or fever    albuterol (PROAIR HFA/PROVENTIL HFA/VENTOLIN HFA) 108 (90 Base) MCG/ACT inhaler Past Week at not with  Yes Yes   Sig: Inhale 2 puffs into the lungs every 6 hours as needed for shortness of breath, wheezing or cough   benzonatate (TESSALON PERLES) 100 MG capsule Past Week  No Yes   Sig: Take 1 capsule (100 mg) by mouth 3 times daily as needed for cough   cetirizine (ZYRTEC) 10 MG tablet Past Week  Yes Yes   Sig: Take 10 mg by mouth daily as needed for allergies   chlorhexidine (PERIDEX) 0.12 % solution Past Week  Yes Yes   Sig: [CHLORHEXIDINE (PERIDEX) 0.12 % SOLUTION] Apply 15 mL to the mouth or throat at bedtime as needed.    famotidine (PEPCID) 20 MG tablet 7/2/2023  No Yes   Sig: Take 1 tablet (20 mg) by mouth At Bedtime   furosemide (LASIX) 40 MG tablet 7/2/2023  No Yes   Sig: Take 1 tablet (40 mg) by mouth 2 times daily for 30 days   gabapentin (NEURONTIN) 600 MG tablet 7/2/2023  No Yes   Sig: Take 1 tablet (600 mg) by mouth At Bedtime for 30 days   ipratropium (ATROVENT HFA) 17 MCG/ACT inhaler Past Week at has with  Yes Yes   Sig: Inhale 2 puffs into the lungs every 6 hours as needed for wheezing   metoprolol succinate ER (TOPROL XL) 50 MG 24 hr tablet 7/2/2023  No Yes   Sig: Take 1.5 tablets (75 mg) by mouth At Bedtime   mirabegron (MYRBETRIQ) 50 MG 24 hr tablet 7/2/2023  No Yes   Sig: Take 1 tablet (50 mg) by mouth daily   multivitamin w/minerals (THERA-VIT-M) tablet 7/2/2023  Yes Yes   Sig: Take 1 tablet by mouth daily   nystatin (MYCOSTATIN) 547611 UNIT/GM external cream Past Week  No Yes   Sig: Apply topically 3 times daily Until clear.   potassium  chloride ER (K-TAB/KLOR-CON) 10 MEQ CR tablet 7/2/2023  Yes Yes   Sig: Take 1 tablet by mouth daily   rivaroxaban ANTICOAGULANT (XARELTO) 15 MG TABS tablet 7/2/2023  Yes Yes   Sig: Take 1 tablet (15 mg) by mouth At Bedtime   simvastatin (ZOCOR) 40 MG tablet 7/2/2023  No Yes   Sig: Take 1 tablet (40 mg) by mouth At Bedtime   solifenacin (VESICARE) 5 MG tablet 7/2/2023  No Yes   Sig: Take 1 tablet (5 mg) by mouth daily   spironolactone (ALDACTONE) 25 MG tablet 7/2/2023  No Yes   Sig: Take 0.5 tablets (12.5 mg) by mouth At Bedtime for 60 days   tamoxifen (NOLVADEX) 20 MG tablet 7/2/2023  No Yes   Sig: Take 1 tablet (20 mg) by mouth daily   traMADol (ULTRAM) 50 MG tablet 7/2/2023  Yes Yes   Sig: Take 50 mg by mouth 2 times daily   vitamin D3 (CHOLECALCIFEROL) 50 mcg (2000 units) tablet 7/2/2023  Yes Yes   Sig: Take 1 tablet by mouth daily      Facility-Administered Medications: None        Review of Systems    The 10 point Review of Systems is negative other than noted in the HPI or here.      Social History   I have reviewed this patient's social history and updated it with pertinent information if needed.  Social History     Tobacco Use     Smoking status: Former     Types: Cigarettes     Quit date: 10/28/2007     Years since quitting: 15.6     Smokeless tobacco: Former     Quit date: 9/6/2004   Vaping Use     Vaping Use: Former   Substance Use Topics     Alcohol use: No     Drug use: No       Family History   I have reviewed this patient's family history and updated it with pertinent information if needed.  Family History   Problem Relation Age of Onset     Osteoporosis Other      Prostate Cancer Brother      Breast Cancer Maternal Aunt         age thought to be in her 70's-80's     Prostate Cancer Maternal Uncle        Allergies   Allergies   Allergen Reactions     Sulfa (Sulfonamide Antibiotics) [Sulfa Antibiotics] Rash     Headaches and dizziness.     Homeopathic Drugs [External Allergen Needs Reconciliation - See  Comment] Unknown     runny nose     Mold Extracts [Molds & Smuts] Unknown     Morphine (Pf) [Morphine] Unknown     hallucinate     Lisinopril Itching, Cough and Unknown     cough     Sulfacetamide Sodium [Sulfacetamide] Rash        Physical Exam   Vital Signs: Temp: 98.3  F (36.8  C)   BP: (!) 142/60 Pulse: 70   Resp: 20 SpO2: 97 % O2 Device: None (Room air)    Weight: 110 lbs 0 oz      GENERAL:  Alert, appears comfortable, in no acute distress, appears stated age   HEAD:  Normocephalic, without obvious abnormality, atraumatic   EYES:  PERRL, conjunctiva/corneas clear, no scleral icterus, EOM's intact   NOSE: Nares normal, septum midline, mucosa normal, no drainage   THROAT: Lips, mucosa, and tongue normal; teeth and gums normal, mouth moist   NECK: Supple, symmetrical, trachea midline   BACK:   Symmetric, no curvature, ROM normal   LUNGS:    Bilateral wheezes throughout, decreased breath sounds at the bases, increased respiratory effort with speaking, on 4 L via nasal cannula; no rales   CHEST WALL:  No tenderness or conspicuous deformity   HEART:  Regular rate and rhythm, S1 and S2 normal, no murmur, rub, or gallop, no conspicuous jugular venous distention noted, peripheral pulses intact    ABDOMEN:   Soft, non-tender, bowel sounds auscultated in all four quadrants, no masses, no organomegaly, no rebound or guarding   EXTREMITIES: Extremities normal, atraumatic, no cyanosis but notable 1+ pitting tender bilateral feet and lower leg edema with a palpable lump skin lesion (approx 2-3 inches on dorsal aspect of left foot)   SKIN: Dry to touch, no exanthems in the visualized areas   NEURO: Alert, oriented x3, moves all four extremities of their own volition    PSYCH: Cooperative and behavior is appropriate          Medical Decision Making       76 MINUTES SPENT BY ME on the date of service doing chart review, history, exam, documentation & further activities per the note.      Data   ------------------------- PAST 24  HR DATA REVIEWED -----------------------------------------------    I have personally reviewed the following data over the past 24 hrs:    18.9 (H)  \   12.6   / 317     136 100 26 /  90   3.7 29 1.93 (H) \       Trop: N/A BNP: 200 (H)       Imaging results reviewed over the past 24 hrs:   Recent Results (from the past 24 hour(s))   XR Chest Port 1 View    Narrative    EXAM: XR CHEST PORT 1 VIEW  LOCATION: [facility]  DATE: 7/3/2023    INDICATION: dyspnea  COMPARISON: 06/22/2023, 06/17/2023 and older studies, CT chest 06/17/2023      Impression    IMPRESSION: Patchy linear and airspace opacities in the left lower lobe have continued to slightly decrease, consistent with resolving left lower lobe pneumonia. Marked emphysema is easier to appreciate on the CT as are the faint nodular opacities in the   right midlung.    No new parenchymal disease. Linear scarring in the left midlung along the fissure is unchanged. Heart and pulmonary vascularity are normal.

## 2023-07-03 NOTE — ED TRIAGE NOTES
Patient discharge last week for SOB/confusion/pneumoni/ CHF. Patient was attempting to follow up with lab work and it was noted the patient was SOB even with increasing her portable oxygen. 95% in triage at 4lpm via nasal cannula.     Patient feet are very swollen in triage. Patient takes lasix.

## 2023-07-03 NOTE — ED NOTES
"EMERGENCY DEPARTMENT SIGN OUT NOTE        ED COURSE AND MEDICAL DECISION MAKING  Patient was signed out to me by Dr Kate Neal at 3:00 PM    In brief, Irene León is a 78 year old female who initially presented for evaluation of leg swelling.     Per chart review, the patient was admitted to [facility] from 6/17/23 to 6/25/23 for COPD exacerbation and hypervolemia. Recommended labs, test, pulmonary, and cardiology follow ups after discharge.     The patient presents that she was just discharged last week on 6/25/23 from the ED. She states that she has severe COPD and heart failure that has gotten worse within the last month. She was recently started on 40 mg of lasix and diuretics. The patient states that she normally is on oxygen at home and reports her levels have been in the upper 80s/low 90s this week. She states she does have a productive cough but \"nothing comes out\".     Of note, her daughter stated that it took 30 minutes for the patient to get out of the house because of her shortness of breath. The patient presented herself to the [facility] clinic today for labs and couldn't get in, so they redirected themselves to the ED.      She denies fever. No other acute symptoms presented.      At time of sign out, there were no pending studies.    I had been made aware of this patient with the initial plan that she was going to be going home.  A little while after signout I was updated by nursing.  Apparently the patient was able to discuss things further with her daughter who was able to convince her that admission would probably be a safe course and this was also the preference of the previous provider Dr. Alfaro.  I discussed the case with the hospitalist, I have ordered her ceftriaxone and doxycycline to be given IV as well as IV Solu-Medrol as this was felt to more likely be a primary exacerbation of COPD with probable component of incompletely treated pneumonia.  Patient was admitted to the " hospital in otherwise stable condition.    FINAL IMPRESSION    1. COPD exacerbation (H)    2. Pneumonia of left upper lobe due to infectious organism    3. Pedal edema    4. Chronic combined systolic and diastolic congestive heart failure (H)        ED MEDS  Medications   ipratropium - albuterol 0.5 mg/2.5 mg/3 mL (DUONEB) neb solution 3 mL (has no administration in time range)   acetaminophen (TYLENOL) tablet 1,000 mg (1,000 mg Oral $Given 7/4/23 0058)   albuterol (PROVENTIL HFA/VENTOLIN HFA) inhaler (has no administration in time range)   benzonatate (TESSALON) capsule 100 mg (has no administration in time range)   cetirizine (zyrTEC) tablet 10 mg (has no administration in time range)   chlorhexidine (PERIDEX) 0.12 % solution 15 mL (has no administration in time range)   famotidine (PEPCID) tablet 20 mg (20 mg Oral $Given 7/3/23 2114)   fluticasone-vilanterol (BREO ELLIPTA) 200-25 MCG/ACT inhaler 1 puff (1 puff Inhalation $Given 7/4/23 0844)     And   umeclidinium (INCRUSE ELLIPTA) 62.5 MCG/ACT inhaler 1 puff (1 puff Inhalation $Given 7/4/23 0844)   furosemide (LASIX) tablet 40 mg (40 mg Oral $Given 7/4/23 0842)   ipratropium (ATROVENT HFA) Inhaler 2 puff (has no administration in time range)   predniSONE (DELTASONE) tablet 40 mg (40 mg Oral $Given 7/4/23 0842)   doxycycline hyclate (VIBRAMYCIN) capsule 100 mg (100 mg Oral $Given 7/4/23 0842)   cefTRIAXone (ROCEPHIN) 1 g vial to attach to  mL bag for ADULTS or NS 50 mL bag for PEDS (1 g Intravenous $New Bag 7/3/23 2047)   metoprolol succinate ER (TOPROL XL) 24 hr tablet 75 mg (75 mg Oral $Given 7/3/23 2224)   mirabegron (MYRBETRIQ) 24 hr tablet 25 mg (25 mg Oral $Given 7/3/23 2102)   gabapentin (NEURONTIN) capsule 300 mg (300 mg Oral $Given 7/3/23 2103)   potassium chloride ER (KLOR-CON M) CR tablet 10 mEq (10 mEq Oral $Given 7/4/23 0841)   rivaroxaban ANTICOAGULANT (XARELTO) tablet 15 mg (15 mg Oral $Given 7/3/23 2102)   simvastatin (ZOCOR) tablet 40 mg (40  mg Oral $Given 7/3/23 2103)   spironolactone (ALDACTONE) half-tab 12.5 mg (12.5 mg Oral $Given 7/3/23 2224)   tamoxifen (NOLVADEX) tablet 20 mg (20 mg Oral $Given 7/4/23 0843)   traMADol (ULTRAM) tablet 50 mg (50 mg Oral $Given 7/4/23 0005)   lidocaine 1 % 0.1-1 mL (has no administration in time range)   lidocaine (LMX4) cream (has no administration in time range)   sodium chloride (PF) 0.9% PF flush 3 mL (3 mLs Intracatheter $Given 7/4/23 0059)   sodium chloride (PF) 0.9% PF flush 3 mL (has no administration in time range)   melatonin tablet 1 mg (has no administration in time range)   Patient is already receiving anticoagulation with heparin, enoxaparin (LOVENOX), warfarin (COUMADIN)  or other anticoagulant medication (has no administration in time range)   ondansetron (ZOFRAN ODT) ODT tab 4 mg (has no administration in time range)     Or   ondansetron (ZOFRAN) injection 4 mg (has no administration in time range)   loperamide (IMODIUM) capsule 2 mg (has no administration in time range)   emollient (VANICREAM) cream 1 g (has no administration in time range)   miconazole (MICATIN) 2 % powder (has no administration in time range)   methylPREDNISolone sodium succinate (solu-MEDROL) injection 81.25 mg (81.25 mg Intravenous $Given 7/3/23 1656)   doxycycline (VIBRAMYCIN) 100 mg vial to attach to  mL bag (100 mg Intravenous $New Bag 7/3/23 3337)       LAB  Labs Ordered and Resulted from Time of ED Arrival to Time of ED Departure   BASIC METABOLIC PANEL - Abnormal       Result Value    Sodium 136      Potassium 3.7      Chloride 100      Carbon Dioxide (CO2) 29      Anion Gap 7      Urea Nitrogen 26      Creatinine 1.93 (*)     Calcium 9.1      Glucose 90      GFR Estimate 26 (*)    B-TYPE NATRIURETIC PEPTIDE ( EAST ONLY) - Abnormal     (*)    CBC WITH PLATELETS AND DIFFERENTIAL - Abnormal    WBC Count 18.9 (*)     RBC Count 4.42      Hemoglobin 12.6      Hematocrit 40.3      MCV 91      MCH 28.5      MCHC  31.3 (*)     RDW 15.7 (*)     Platelet Count 317      % Neutrophils 80      % Lymphocytes 8      % Monocytes 10      % Eosinophils 1      % Basophils 0      % Immature Granulocytes 1      NRBCs per 100 WBC 0      Absolute Neutrophils 15.1 (*)     Absolute Lymphocytes 1.4      Absolute Monocytes 1.9 (*)     Absolute Eosinophils 0.2      Absolute Basophils 0.1      Absolute Immature Granulocytes 0.2      Absolute NRBCs 0.0     TROPONIN I - Normal    Troponin I 0.04     MAGNESIUM - Normal    Magnesium 2.6     INFLUENZA A/B, RSV, & SARS-COV2 PCR - Normal    Influenza A PCR Negative      Influenza B PCR Negative      RSV PCR Negative      SARS CoV2 PCR Negative           RADIOLOGY    XR Chest Port 1 View   Final Result   IMPRESSION: Patchy linear and airspace opacities in the left lower lobe have continued to slightly decrease, consistent with resolving left lower lobe pneumonia. Marked emphysema is easier to appreciate on the CT as are the faint nodular opacities in the    right midlung.      No new parenchymal disease. Linear scarring in the left midlung along the fissure is unchanged. Heart and pulmonary vascularity are normal.          DISCHARGE MEDS  Current Discharge Medication List      START taking these medications    Details   doxycycline hyclate (VIBRAMYCIN) 100 MG capsule Take 1 capsule (100 mg) by mouth 2 times daily for 10 days  Qty: 20 capsule, Refills: 0      predniSONE (DELTASONE) 20 MG tablet Take 3 tablets on day 1, then take 2 tablets on days 2 through 4, 1 tablet on day 5-6, half a tablet on day 7, then discontinue  Qty: 15 tablet, Refills: 0             Riky Lemus M.D.  [facility] EMERGENCY ROOM  [address] [id number] [phone]     Riky Lemus MD  07/04/23 1011

## 2023-07-03 NOTE — DISCHARGE INSTRUCTIONS
I did put you on a different antibiotic than you are on in the hospital because if you were to have significant diarrhea it may complicate your treatment of your congestive heart failure.  So, you will take doxycycline instead of the Augmentin you were previously on.  Then, I restarted your steroid taper.  You should also use your inhaler and nebulizers as previously instructed when you are sick.    If you do worsen again please to return to the emergency department for repeat evaluation.  Otherwise, your heart clinic does want to see you in follow-up to recheck your congestive heart failure and with a little bit of edema that you have in your feet I would recommend that you set up an appointment to see them in about 3 to 5 days.  I did put in a referral for the rapid access clinic to try to get you in sooner with the congestive heart failure team there.  You should receive a phone call from them on Wednesday to set up that appointment within the next 5 days.    Keep your legs elevated as much as possible over the course of the next few days especially when hot out and this will help to decrease the swelling in your feet.  Otherwise, continue your same medications with the same dose of the new diuretic.

## 2023-07-03 NOTE — ED PROVIDER NOTES
Emergency Department Encounter     Evaluation Date & Time:   No admission date for patient encounter.    CHIEF COMPLAINT:  Leg Swelling      Triage Note:Patient discharge last week for SOB/confusion/pneumoni/ CHF. Patient was attempting to follow up with lab work and it was noted the patient was SOB even with increasing her portable oxygen. 95% in triage at 4lpm via nasal cannula.     Patient feet are very swollen in triage. Patient takes lasix.          FINAL IMPRESSION:    ICD-10-CM    1. COPD exacerbation (H)  J44.1       2. Pneumonia of left upper lobe due to infectious organism  J18.9     possible persistent community aquired pneumonia      3. Pedal edema  R60.0       4. Chronic combined systolic and diastolic congestive heart failure (H)  I50.42 [facility]  Referral          Impression and Plan     ED COURSE & MEDICAL DECISION MAKING:    10:42 AM I met with the patient to gather history and to perform my initial exam. We discussed plans for the ED course, including diagnostic testing and treatment.     ED Course as of 07/03/23 1521   Mon Jul 03, 2023   1044 Deepthi has a history of COPD and CHF.  She was just recently hospitalized and I did review the hospitalization records for that.  She was discharged with new diuretics.  She had been doing well but has recently worsened over the course of the last couple of days with increasing swelling and increasing work of breathing with any minimal exertion.  She is not in any distress and was seen initially in the waiting room.  Will start work-up with chest x-ray to see if there is any infiltrate or increasing edema.  In terms of her COPD, she is not wheezing or seem tight at all so unlikely that that is severe enough to require emergent treatment.  Certainly it may contribute to underlying shortness of breath with exertion especially with poor air quality and heat and humidity over the course of the past few weeks.  She does appear to be  slightly fluid overloaded with edema setting back and at her ankles but that may also be just dependent edema related to heat.  We will do EKG to look for signs of arrhythmia or new ischemia and pair with troponin and BNP.  Otherwise look at electrolytes to make sure that they are not severely out of balance with her new diuretic therapy and to look for signs of anemia which could be contributing to progressive CHF   1154 I reviewed her chart.  Her antibiotics were finished for COPD exacerbation and fluid overload hospital admission on 6/24, and she would have finished her prednisone on 6/27.  So, elevation of her white blood cell count today is a little bit outside of how long I would expected to be elevated because of just prednisone therapy.  We will continue with plan for chest imaging and look for signs of pneumonia.  She does not describe her cough changing, but certainly her breathing is worsened which may indicate bacterial tracheitis, pneumonia, or obviously contributing CHF.  Her renal function is adequate and on last visit for similar symptoms she did have a VQ scan done which showed no evidence for PE.  So, I think as other etiology is more likely no need to repeat V/Q scanning today.  Her new diuretic was spironolactone and renal function today of 1.93 as compared to previous.  Also, her cardiologist recommended outpatient angiogram to look for signs of coronary artery disease   1434 She had been improving and now is again worsening.  Chest x-ray shows residual infiltrate which may just be residual edema in the area of the previous pneumonia but since symptomatically she feels that she is worsened over the last couple of days I would favor incomplete treatment of pneumonia and we discussed doing another course of antibiotics and steroids.  She indicates the she understands the risk and benefit of recurrent course of antibiotics in particular as it relates to drug resistance, and recurrence of diarrhea  or C. difficile as well as the steroids which may worsen renal function and affect her edema.  Her edema just in her feet is not something that I would change her diuretics for.  Instead I would have her focus on elevating her legs especially as there is no edema noted on her chest x-ray.   1519 We did discuss the option for an observation stay in the hospital.  However, she feels that she is doing better now and that she was simply have a hard time catching her breath earlier.  She is adamantly stating that she would like discharge home.  Her daughter is not happy with her for this decision and feels like she will just continue to worsen at home, but the patient states that she lives nearby, she does have decision-making capability and demonstrates to me that she understands her request.  She has improved since she has been here, and has resources available to her at home for her oxygen needs.  She is discharged at her request with the new prescriptions to try a second course of antibiotics to continue to hopefully clear up any residual infection.  Because she had significant diarrhea with the Augmentin and is having problems with fluid balance related to her CHF, I think it would be safer in this case to try doxycycline instead of restarting her on the Augmentin.  She was signed out to Dr. Lemus while we wait for transportation arrangements but otherwise is discharged at her request.  I did also place referral to the rapid access clinic for close follow-up on her CHF.       At the conclusion of the encounter I discussed the results of all the tests and the disposition. The questions were answered. The patient or family acknowledged understanding and was agreeable with the care plan.          0 minutes of critical care time        MEDICATIONS GIVEN IN THE EMERGENCY DEPARTMENT:  Medications - No data to display    NEW PRESCRIPTIONS STARTED AT TODAY'S ED VISIT:  New Prescriptions    DOXYCYCLINE HYCLATE (VIBRAMYCIN)  "100 MG CAPSULE    Take 1 capsule (100 mg) by mouth 2 times daily for 10 days    PREDNISONE (DELTASONE) 20 MG TABLET    Take 3 tablets on day 1, then take 2 tablets on days 2 through 4, 1 tablet on day 5-6, half a tablet on day 7, then discontinue       HPI     The history is provided by the patient and a relative.        Irene León is a 78 year old female with a pertinent history of hypertension, atrial fibrillation, hyperlipidemia, CAD, CKD, cardiomyopathy, COPD, CHF, breast cancer, and depression who presents to this ED by private car for evaluation of leg swelling.    Per chart review, the patient was admitted to [facility] from 6/17/23 to 6/25/23 for COPD exacerbation and hypervolemia. Recommended labs, test, pulmonary, and cardiology follow ups after discharge.    The patient presents that she was just discharged last week on 6/25/23 from the ED. She states that she has severe COPD and heart failure that has gotten worse within the last month. She was recently started on 40 mg of lasix and diuretics. The patient states that she normally is on oxygen at home and reports her levels have been in the upper 80s/low 90s this week. She states she does have a productive cough but \"nothing comes out\".    Of note, her daughter stated that it took 30 minutes for the patient to get out of the house because of her shortness of breath. The patient presented herself to the [facility] clinic today for labs and couldn't get in, so they redirected themselves to the ED.     She denies fever. No other acute symptoms presented.     REVIEW OF SYSTEMS:  Review of Systems   Constitutional: Negative for fever.   Respiratory: Positive for cough and shortness of breath.    Cardiovascular: Positive for leg swelling.   All other systems reviewed and are negative.    remainder of systems are all otherwise negative.        Medical History     Past Medical History:   Diagnosis Date     ANATOLIY (acute kidney injury) (H)      Allergic " rhinitis      Arrhythmia      Atrial fibrillation with RVR (H)      Bacteremia      Breast cancer (H) 2017     Cardiomyopathy (H)      Centrilobular emphysema (H)      CHF (congestive heart failure) (H)      CKD (chronic kidney disease)      COPD (chronic obstructive pulmonary disease) (H)      Coronary artery disease      Depression      Dysphagia      E. coli sepsis (H)      Factor 5 Leiden mutation, heterozygous (H)      GERD (gastroesophageal reflux disease)      Heart failure with reduced ejection fraction (H) 4/17/2023     Hx of radiation therapy 2017     Hyperlipidemia      Hypertension      Hypokalemia      Hypomagnesemia      Idiopathic cardiomyopathy (H)      Left hip pain 4/30/2014     OAB (overactive bladder)      Osteoporosis      Sacral insufficiency fracture      Sinusitis      Syncope      Urge incontinence      Vocal cord dysfunction        Past Surgical History:   Procedure Laterality Date     ARTHROPLASTY REVISION HIP Left      BIOPSY BREAST Right 2017     BLADDER SURGERY      bladder interstim with removal     CARDIAC CATHETERIZATION       CATARACT EXTRACTION Left 07/18/2017     IR ABDOMINAL AORTOGRAM  4/16/2003     IR AORTIC ARCH 4 VESSEL ANGIOGRAM  4/16/2003     IR MISCELLANEOUS PROCEDURE  4/16/2003     LUMPECTOMY BREAST Left 06/2017    x2     PICC DOUBLE LUMEN PLACEMENT  4/11/2022          PICC TRIPLE LUMEN PLACEMENT  4/7/2022          ZZC OPEN TX FEMORAL FRACTURE DISTAL MED/LAT CONDYLE Left 10/28/2015    Procedure: OPEN REDUCTION INTERNAL FIXATION LEFT  PROXIMAL FEMUR PERIPROSTHETIC FRACTURE;  Surgeon: Yovanny Albarran MD;  Location: [facility];  Service: Orthopedics       Family History   Problem Relation Age of Onset     Osteoporosis Other      Prostate Cancer Brother      Breast Cancer Maternal Aunt         age thought to be in her 70's-80's     Prostate Cancer Maternal Uncle        Social History     Tobacco Use     Smoking status: Former     Types: Cigarettes     Quit date:  "10/28/2007     Years since quitting: 15.6     Smokeless tobacco: Former     Quit date: 9/6/2004   Vaping Use     Vaping Use: Former   Substance Use Topics     Alcohol use: No     Drug use: No       doxycycline hyclate (VIBRAMYCIN) 100 MG capsule  predniSONE (DELTASONE) 20 MG tablet  acetaminophen (TYLENOL) 500 MG tablet  acetylcysteine (MUCOMYST) 10 % nebulizer solution  albuterol (PROVENTIL) (2.5 MG/3ML) 0.083% neb solution  benzonatate (TESSALON PERLES) 100 MG capsule  CALCIUM-MAGNESIUM-ZINC PO  cetirizine (ZYRTEC) 10 MG tablet  chlorhexidine (PERIDEX) 0.12 % solution  famotidine (PEPCID) 20 MG tablet  Fluticasone-Umeclidin-Vilanterol (TRELEGY ELLIPTA) 200-62.5-25 MCG/INH oral inhaler  furosemide (LASIX) 40 MG tablet  gabapentin (NEURONTIN) 600 MG tablet  HYDROmorphone, STANDARD CONC, (DILAUDID) 1 MG/ML oral solution  ipratropium (ATROVENT HFA) 17 MCG/ACT inhaler  melatonin 5 MG tablet  metoprolol succinate ER (TOPROL XL) 50 MG 24 hr tablet  mirabegron (MYRBETRIQ) 50 MG 24 hr tablet  multivitamin w/minerals (THERA-VIT-M) tablet  nystatin (MYCOSTATIN) 012752 UNIT/GM external cream  potassium chloride ER (K-TAB/KLOR-CON) 10 MEQ CR tablet  rivaroxaban ANTICOAGULANT (XARELTO) 15 MG TABS tablet  simvastatin (ZOCOR) 40 MG tablet  solifenacin (VESICARE) 5 MG tablet  spironolactone (ALDACTONE) 25 MG tablet  tamoxifen (NOLVADEX) 20 MG tablet  traMADol (ULTRAM) 50 MG tablet  vitamin D3 (CHOLECALCIFEROL) 50 mcg (2000 units) tablet        Physical Exam     First Vitals:  Patient Vitals for the past 24 hrs:   BP Temp Pulse Resp SpO2 Height Weight   07/03/23 1246 (!) 142/60 -- 70 -- 97 % -- --   07/03/23 1229 (!) 166/67 -- 70 -- 98 % -- --   07/03/23 1214 (!) 151/65 -- -- -- -- -- --   07/03/23 1013 -- -- -- -- -- 1.499 m (4' 11\") 49.9 kg (110 lb)   07/03/23 1012 -- 98.3  F (36.8  C) -- -- -- -- --   07/03/23 1011 108/53 -- 64 20 94 % -- --       PHYSICAL EXAM:   Constitutional:   Sitting upright in wheelchair. Fully clothed. " Conversive. Able to speak in full sentences. Base appears dry.  HENT:  Normocephalic, posterior pharynx wnl, mucous membranes moist and dark pink.   Eyes:  PERRL, EOMI, Conjunctiva normal, No discharge, no scleral icterus.  Respiratory:  Breathing easily with nasal can of oxygen at 4.  Cardiovascular:  Slightly decreased breath sounds in left upper lobe. Regular rhythm. nl s1s2 0 murmurs, rubs, or gallops.  Peripheral pulses dp, pt, and radial are wnl.  1 plus pedal edema wihout swelling extending up her calfs/shins.  GI:  Bowel sounds normal, Soft, No tenderness, No flank tenderness, nondistended.  :No CVA tenderness.   Musculoskeletal:  Moves all extremities.  No erythematous or swollen major joints, edema in lower extremities in feet and distal shin.  Integument:  Ulceration right distal, no surrounding erythema.  Neurologic:  Alert & oriented x 3, Normal motor function, Normal sensory function, No focal deficits noted. Normal speech.  Psychiatric:  Affect normal, Judgment normal, Mood normal.     Results     LAB AND RADIOLOGY:  All pertinent labs reviewed and interpreted  Results for orders placed or performed during the hospital encounter of 07/03/23   XR Chest Port 1 View     Status: None    Narrative    EXAM: XR CHEST PORT 1 VIEW  LOCATION: [facility]  DATE: 7/3/2023    INDICATION: dyspnea  COMPARISON: 06/22/2023, 06/17/2023 and older studies, CT chest 06/17/2023      Impression    IMPRESSION: Patchy linear and airspace opacities in the left lower lobe have continued to slightly decrease, consistent with resolving left lower lobe pneumonia. Marked emphysema is easier to appreciate on the CT as are the faint nodular opacities in the   right midlung.    No new parenchymal disease. Linear scarring in the left midlung along the fissure is unchanged. Heart and pulmonary vascularity are normal.   Basic metabolic panel     Status: Abnormal   Result Value Ref Range    Sodium 136 136 - 145  mmol/L    Potassium 3.7 3.5 - 5.0 mmol/L    Chloride 100 98 - 107 mmol/L    Carbon Dioxide (CO2) 29 22 - 31 mmol/L    Anion Gap 7 5 - 18 mmol/L    Urea Nitrogen 26 8 - 28 mg/dL    Creatinine 1.93 (H) 0.60 - 1.10 mg/dL    Calcium 9.1 8.5 - 10.5 mg/dL    Glucose 90 70 - 125 mg/dL    GFR Estimate 26 (L) >60 mL/min/1.73m2   Troponin I     Status: Normal   Result Value Ref Range    Troponin I 0.04 0.00 - 0.29 ng/mL   B-Type Natriuretic Peptide ([facility] Only)     Status: Abnormal   Result Value Ref Range     (H) 0 - 147 pg/mL   Magnesium     Status: Normal   Result Value Ref Range    Magnesium 2.6 1.8 - 2.6 mg/dL   Success Draw     Status: None    Narrative    The following orders were created for panel order Success Draw.  Procedure                               Abnormality         Status                     ---------                               -----------         ------                     Extra Blue Top Tube[[id number]]                              Final result               Extra Red Top Tube[[id number]]                               Final result               Extra Green Top (Lithium...[[id number]]                      Final result               Extra Purple Top Tube[[id number]]                                                       Extra Blood Bank Purple ...[[id number]]                                                 Extra Purple Top Tube ON...[[id number]]                                                   Please view results for these tests on the individual orders.   CBC with platelets and differential     Status: Abnormal   Result Value Ref Range    WBC Count 18.9 (H) 4.0 - 11.0 10e3/uL    RBC Count 4.42 3.80 - 5.20 10e6/uL    Hemoglobin 12.6 11.7 - 15.7 g/dL    Hematocrit 40.3 35.0 - 47.0 %    MCV 91 78 - 100 fL    MCH 28.5 26.5 - 33.0 pg    MCHC 31.3 (L) 31.5 - 36.5 g/dL    RDW 15.7 (H) 10.0 - 15.0 %    Platelet Count 317 150 - 450 10e3/uL    % Neutrophils 80 %    % Lymphocytes 8 %    % Monocytes 10 %    %  Eosinophils 1 %    % Basophils 0 %    % Immature Granulocytes 1 %    NRBCs per 100 WBC 0 <1 /100    Absolute Neutrophils 15.1 (H) 1.6 - 8.3 10e3/uL    Absolute Lymphocytes 1.4 0.8 - 5.3 10e3/uL    Absolute Monocytes 1.9 (H) 0.0 - 1.3 10e3/uL    Absolute Eosinophils 0.2 0.0 - 0.7 10e3/uL    Absolute Basophils 0.1 0.0 - 0.2 10e3/uL    Absolute Immature Granulocytes 0.2 <=0.4 10e3/uL    Absolute NRBCs 0.0 10e3/uL   Extra Blue Top Tube     Status: None   Result Value Ref Range    Hold Specimen JIC    Extra Red Top Tube     Status: None   Result Value Ref Range    Hold Specimen JIC    Extra Green Top (Lithium Heparin) Tube     Status: None   Result Value Ref Range    Hold Specimen JIC    CBC with platelets differential     Status: Abnormal    Narrative    The following orders were created for panel order CBC with platelets differential.  Procedure                               Abnormality         Status                     ---------                               -----------         ------                     CBC with platelets and d...[[id number]]  Abnormal            Final result                 Please view results for these tests on the individual orders.         ECG:    Performed at: 10:58 AM    Impression: Sinus rhythm Left axis deviation Left bundle branch block Abnormal ECG    Rate: 63 BPM  Rhythm: Sinus  Axis: 078  WV Interval: 142 ms  QRS Interval: 128 ms  QTc Interval: 489 ms  ST Changes: Nonspecific T wave abnormality no longer evident in Inferior leads T wave inversion less evident in Lateral leads  Comparison: 18-JUN-2023    I have independently reviewed and interpreted the EKS(s) documented above    PROCEDURES:  Procedures:      [facility] Documentation     Medical Decision Making    History:    Supplemental history from: Documented in chart, if applicable    External Record(s) reviewed: Documented in chart, if applicable.    Work Up:    Chart documentation includes differential considered and any  EKGs or imaging independently interpreted by provider, where specified.    In additional to work up documented, I considered the following work up: Documented in chart, if applicable.    External consultation:    Discussion of management with another provider: Documented in chart, if applicable    Complicating factors:    Care impacted by chronic illness: Chronic Kidney Disease, Chronic Lung Disease, Heart Disease, Hyperlipidemia, Hypertension and Mental Health    Care affected by social determinants of health: N/A    Disposition considerations: Discharge. I prescribed additional prescription strength medication(s) as charted. See documentation for any additional details.           The creation of this record is based on the scribe s observations of the work being performed by Yamini Gill  and the provider s statements to them. This document has been checked and approved by MD Kate Porter MD  Emergency Medicine  [facility] EMERGENCY ROOM         Kate Neal MD  07/03/23 1527

## 2023-07-04 NOTE — PROGRESS NOTES
Patient has been assessed for Home Oxygen needs.     Pulse oximetry (SpO2) and Oxygen flow readings:    SpO2 = 96% on room air at rest while awake.    SpO2 improved to 98% on 3 liters/minute at rest.    SpO2 = 88% on room air during activity/with exercise.    *SpO2 improved to 90% on 2 liters/minute during activity/with exercise.

## 2023-07-04 NOTE — PROGRESS NOTES
07/04/23 1430   Appointment Info   Signing Clinician's Name / Credentials (PT) Lizzy Patricio, PT, DPT   Living Environment   People in Home spouse   Current Living Arrangements house   Home Accessibility stairs to enter home;stairs within home   Number of Stairs, Main Entrance 2   Stair Railings, Main Entrance railings on both sides of stairs   Number of Stairs, Within Home, Primary greater than 10 stairs   Stair Railings, Within Home, Primary railing on right side (ascending)   Self-Care   Usual Activity Tolerance moderate   Current Activity Tolerance moderate   Equipment Currently Used at Home cane, straight;shower chair;walker, rolling   Fall history within last six months no   Activity/Exercise/Self-Care Comment uses SPC in home, FWW in community   General Information   Onset of Illness/Injury or Date of Surgery 07/03/23   Referring Physician Chin Parisi MD   Patient/Family Therapy Goals Statement (PT) home   Pertinent History of Current Problem (include personal factors and/or comorbidities that impact the POC) pneumonia L upper lobe, COPD, breast CA   Existing Precautions/Restrictions oxygen therapy device and L/min  (2L O2 at baseline, goal per pt 88-92%)   Weight-Bearing Status - LLE full weight-bearing   Weight-Bearing Status - RLE full weight-bearing   Range of Motion (ROM)   ROM Comment WFL   Strength (Manual Muscle Testing)   Strength Comments WFL   Bed Mobility   Comment, (Bed Mobility) in chair   Transfers   Transfers sit-stand transfer   Sit-Stand Transfer   Sit-Stand [place] (Transfers) supervision;verbal cues   Assistive Device (Sit-Stand Transfers) walker, front-wheeled   Gait/Stairs (Locomotion)   [place] Level (Gait) contact guard;verbal cues   Assistive Device (Gait) walker, front-wheeled   Distance in Feet 10'   Distance in Feet (Gait) 1x110', 1x75'   Pattern (Gait) step-to;step-through   Deviations/Abnormal Patterns (Gait) sachin decreased;gait speed decreased   Clinical  Impression   Criteria for Skilled Therapeutic Intervention Yes, treatment indicated   PT Diagnosis (PT) impaired functional mobility   Influenced by the following impairments decreased endurance, weakness   Functional limitations due to impairments transfers, gait, stairs   Clinical Presentation (PT Evaluation Complexity) Stable/Uncomplicated   Clinical Presentation Rationale Presents as medically diagnosed.   Clinical Decision Making (Complexity) moderate complexity   Planned Therapy Interventions (PT) gait training;patient/family education;stair training;strengthening;transfer training   Anticipated Equipment Needs at Discharge (PT) walker, rolling   Risk & Benefits of therapy have been explained evaluation/treatment results reviewed;care plan/treatment goals reviewed;risks/benefits reviewed;participants voiced agreement with care plan;participants included;patient   PT Total Evaluation Time   PT Eval, Moderate Complexity Minutes (55865) 12   Physical Therapy Goals   PT Frequency Daily   PT Predicted Duration/Target Date for Goal Attainment 07/10/23   PT: Transfers Modified independent;Sit to/from stand;Assistive device   PT: Gait Modified independent;Rolling walker;100 feet   PT: Stairs Supervision/stand-by assist;7 stairs;Rail on right;Assistive device   Therapeutic Activity   Therapeutic Activities: dynamic activities to improve functional performance Minutes (57497) 9   Treatment Detail/Skilled Intervention sit to/from stand, SBA with reviewing safe hand placement and PLB, standing balance x4 min with bilat UE support, tolerates well, steady, CGA   Gait Training   Gait Training Minutes (94594) 15   Symptoms Noted During/After Treatment (Gait Training) fatigue   Treatment Detail/Skilled Intervention cueing for posture, safety and PLB, one seated rest break due to fatigue, 88% on 2L O2 at rest, 89% upon returning from ambulation, walked with 4L O2, STAIRS: 3 stairs up/down with bilat rails, non-reciprocal  patterning   [place] Level (Gait Training) contact guard   Physical Assistance Level (Gait Training) 1 person + 1 person to manage equipment  (chair follow)   Weight Bearing (Gait Training) full weight-bearing   Assistive Device (Gait Training) rolling walker   Gait Analysis Deviations decreased sachin;decreased step length   PT Discharge Planning   PT Plan stairs, gait   PT Discharge Recommendation (DC Rec) home with assist;home with home care physical therapy   PT Rationale for DC Rec CGA for all mobility, amb household distances, home with assist from family as needed, pending further clinical improvement with breathing and activity tolerance.   PT Brief overview of current status SBA/CGA for mobility, ambulating 110', 3 stairs up/down with bilat rails, CGA   Total Session Time   Timed Code Treatment Minutes 24   Total Session Time (sum of timed and untimed services) 36

## 2023-07-04 NOTE — PLAN OF CARE
Problem: Pneumonia  Goal: Resolution of Infection Signs and Symptoms  Outcome: Progressing     Problem: Pneumonia  Goal: Effective Oxygenation and Ventilation  Outcome: Progressing  Intervention: Promote Airway Secretion Clearance  Recent Flowsheet Documentation  Taken 7/4/2023 0000 by Elaina Delacruz, RN  Cough And Deep Breathing: done with encouragement  Intervention: Optimize Oxygenation and Ventilation  Recent Flowsheet Documentation  Taken 7/4/2023 0000 by Elaina Delacruz, RN  Head of Bed (HOB) Positioning: HOB at 30-45 degrees    A&Ox4. VSS, on 3 liters O2 via NC sating in the low 90's. BP WNL's on shift. C/o lower back pain. PRN Tramadol and Tylenol given. LS coarse/diminished. Infrequent dry cough. Purwick in place overnight; good UOP. Up SBA. Sleeping in chair overnight. PIV SL. +1-2 edema to BLE. BLE dusky. Discharge pending.

## 2023-07-04 NOTE — PLAN OF CARE
Problem: Pneumonia  Goal: Effective Oxygenation and Ventilation  Outcome: Progressing  Intervention: Promote Airway Secretion Clearance  Recent Flowsheet Documentation  Taken 7/4/2023 1519 by Marcy Huynh, RN  Cough And Deep Breathing: done with encouragement  Taken 7/4/2023 0839 by Marcy Huynh, RN  Cough And Deep Breathing: done with encouragement   Goal Outcome Evaluation:    Patient alert and oriented. 1A to SBA. PT/OT eval for discharge with home care. Dyspneic with ambulation. Back to baseline 2L O2. Home O2 Assessment completed. Denies pain. VSS. Call light within reach. Alarms on.

## 2023-07-04 NOTE — PLAN OF CARE
Problem: Pneumonia  Goal: Fluid Balance  Outcome: Progressing     Problem: Pneumonia  Goal: Resolution of Infection Signs and Symptoms  Outcome: Progressing     Problem: Pneumonia  Goal: Effective Oxygenation and Ventilation  Outcome: Progressing   Goal Outcome Evaluation: Patient alert and oriented, pleasant. Up SBA, staying primarily in the chair. Congested nonproductive cough, lung sounds coarse. Weaned O2 to 3LPM, still above baseline of 2 LPM. Edema to BLEs, legs milana with scabbing. Denies pain. Patient very insistent upon a purewick despite being able to stand and pivot, she states she gets so fatigued from her frequent bathroom trips with diuretics. Purewick in place and patient educated that this is in place for tonight only and that it will be reassessed daily and that she needs to continue to mobilize and walk.

## 2023-07-04 NOTE — PROVIDER NOTIFICATION
9:24 PM Paged cross cover in regards to patient with extensive cardiac history, hypotensive on right arm x3 checks but hypertensive on left arm 175/80. Feels fine, heart rate 75. Ok for metoprolol/diuretics?    9:46 PM Per Dr Fernandez monitor, continue to check both arms and give cardiac medications. If persistent this may need further investigation by primary team in the morning.

## 2023-07-04 NOTE — INTERIM SUMMARY
Patient's BP is lower in the right arm (80s/50s initially, improved to 112/57 later) compared to the left arm (175/80). Unclear if this is measurement error or if this represents underlying vascular issue. Patient is due for her night time cardiac meds. She is asymptomatic and feeling well per her bedside RN. I think it is safe to give the usual medications for now; keep monitoring BP and comparing both arms. AM team to evaluate.  Celine Fernandez MD

## 2023-07-04 NOTE — CONSULTS
Care Management Initial Consult    General Information  Assessment completed with: Patient, patient  Type of CM/SW Visit: Initial Assessment    Primary Care Provider verified and updated as needed: Yes   Readmission within the last 30 days: previous discharge plan unsuccessful   Return Category: Exacerbation of disease  Reason for Consult: discharge planning  Advance Care Planning:            Communication Assessment  Patient's communication style: spoken language (English or Bilingual)    Hearing Difficulty or Deaf: no   Wear Glasses or Blind: no    Cognitive  Cognitive/Neuro/Behavioral: WDL  Level of Consciousness: alert  Arousal Level: opens eyes spontaneously  Orientation: oriented x 4  Mood/Behavior: calm, cooperative  Best Language: 0 - No aphasia  Speech: clear, spontaneous    Living Environment:   People in home: spouse     Current living Arrangements: house      Able to return to prior arrangements: yes       Family/Social Support:  Care provided by: spouse/significant other, child(rhonda)  Provides care for: significant other  Marital Status:   , Children          Description of Support System: Supportive, Involved (from adult daughter-Leelee)    Support Assessment: Lacks adequate physical care, Lacks necessary supervision and assistance, Lacks adequate emotional support    Current Resources:   Patient receiving home care services:  (Unclear-patient reports supposed to have home PT [facility] and also told to do OP Cardiac Rehab.)     Community Resources:  (No current Home Care yet)  Equipment currently used at home: cane, straight, shower chair, walker, rolling  Supplies currently used at home:      Employment/Financial:  Employment Status: retired     Employment/ Comments: Spouse, Santos, was in [employer].  Writer attempted to call [facility] but closed due to July 4th.  Financial Concerns:             Does the patient's insurance plan have a 3 day qualifying hospital stay waiver?   No    Lifestyle & Psychosocial Needs:  Social Determinants of Health     Tobacco Use: Medium Risk (7/3/2023)    Patient History      Smoking Tobacco Use: Former      Smokeless Tobacco Use: Former      Passive Exposure: Not on file   Alcohol Use: Not on file   Financial Resource Strain: Not on file   Food Insecurity: Not on file   Transportation Needs: Not on file   Physical Activity: Not on file   Stress: Not on file   Social Connections: Not on file   Intimate Partner Violence: Not on file   Depression: Not at risk (10/4/2022)    PHQ-2      PHQ-2 Score: 0   Housing Stability: Not on file       Functional Status:  Prior to admission patient needed assistance:   Dependent ADLs:: Ambulation-cane, Ambulation-walker, Dressing, Transfers  Dependent IADLs:: Cooking, Cleaning, Laundry, Shopping, Meal Preparation, Transportation  Assesssment of Functional Status: Not at baseline with ADL Functioning, Not at baseline with mobility, Not at  functional baseline    Mental Health Status:  Mental Health Status: No Current Concerns       Chemical Dependency Status:                Values/Beliefs:  Spiritual, Cultural Beliefs, Sikh Practices, Values that affect care:                 Additional Information:  Writer met with patient who reports lives with spouse Santos who has Dementia-uses daughter Leelee for transportation needs.  Orders groceries from [employer], manages own medications with pill planner.    Patient reports plans to discharge home with Home Care-she thinks [facility] but not sure.  Also plans to attend [facility] Cardiac Rehab.  Writer discussed Medicare requiring home bound status and would be unable to receive home PT/OT AND attend OP Cardiac Rehab-Typically, patient will work with home care for a bit and then transfer to OP Cardiac Rehab-patient upset by this news.    Patient refusing any discussion discharge TCU.    Lives multi-level home with 2 steps into home and 7 stairs to second level where bathroom located.   Writer asked bedside RN to please request PT/OT order for evaluation.    Currently home oxygen provided by [employer] and recently Pulmonologist increased oxygen 3-4L. Has Trilogy at home-uses at night.     Writer attempted to update [facility]([phone]) with patient admission but closed for July 4th Holiday.    Update: Per discussion with Dr. Chin Parisi-will proceed with home PT, OT eval and plan discharge 7/5/23. Dr. Parisi will order home care RN, PT, OT at discharge.    Writer sent referral [facility] intake and spoke to Gaby who confirms patient was previously open to this agency 5/23.     Patient will need transport arranged-probably through daughter Leelee.     Renata Mane, CM

## 2023-07-04 NOTE — PROGRESS NOTES
[facility]    Medicine Progress Note - Hospitalist Service    Date of Admission:  7/3/2023    Assessment & Plan      Irene León is a 78 year old female admitted on 7/3/2023. She has a pertinent hx of COPD and CHF, recently admitted and discharged with new diuretics lasix 40mg po bid (hospitalized for copd exacerbation with hypevolema 6/25/23 and completed augmetin). She did have diarrhea ; presentation of several days of increased lower extremity swelling and shortness of breath with hypoxia. CXR showed patchy linear and airspace opacities in the left lower lobe have continued to slightly decrease, consistent with resolving left lower lobe pneumonia; initial plan on day of admission was to discharge from ER but pt became hypoxic with persistent symptoms now requiring increased 02 at 4L NC; baseline is 2L. Started on doxycycline and ceftriaxone and admitted. CR 1.93, wbc 18.9, hgb 12.5, bnp 200 (vs 1800 in June).    Now on 3L, doing an ambulation trial. With ongoing yeast and skin irritation, started miconazole powder to alternate with Cetaphil cream.    Disposition- likely tomorrow, possibly late today     - - - - -  CAP  Shortness of breath  Acute on chronic hypoxic respiratory failure with increased 02 needs  A- as above, initially set for ER discharge but progressive symptoms and is above baseline 2L and now at 4L. Discussd w/ ER team and pt given doxycycline and would opt to also cover typical organisms in additional to atypicals so will give cephalosporin for coverage as well. cxr was equivocal but clinically with active symptoms. Does not appear volume-up on exam and bnp level down at 200 vs 1800 when volume up previously last month. She is worried about possible diarrhea so will have imodium prn.     On hospital day 1, no diarrhea, on 3L; will do walking test.     P:  - continue doxycycline  - continue ceftriaxone  - duonebs scheduled  - 02 and wean as able; baseline 2L   -  tessalon, ordered  - consider adding mucinex if more productive  - was given solumedrol in ER; now transitioned with prednisone 40mg  - imodium prn for any diarrhea   - ambulation trial  - possible late discharge vs early tomorrow    Acute on chronic kidney injury  A: appears baseline around 1-1.2; had Cr 1.93 on admit now 1.54 on HOD1  - avoid nephrotoxic agents  - recheck in AM or at follow up    HFmrEF  Recent stay 6/25 with V/Q negative for PE. Troponins were negative, echo 6/23 EF reduced to 20 to 25% Cardiology consulted during stay and discharged with Lasix 40mg BID; also on new Spironolactone 12.5mg daily and was to see Outpatient EP consult for BiV. Does not appear floridly volume up on exam. Stable   P:  - I/O  - weights  - continue same lasix  - if progressive, would ask cardiology to assess as well     Breast cancer hx  A/p- hx of invasive ductal carcinoma; 2017; appears stable; continue tamoxifen      hld  A/p- continue statin     Yeast infection, irritated mid back wounds  A/p- start miconazole powder and alternate with Cetaphil lotion     Recent hx of metabolic encephalopathy  Mild cognitive impairment  Visual hallucinations  Noted at last hospitalization; Had visual hallucinations 6/17; discharged with home with home care services   - noted, redirect if able  - may require seroquel or zyprex if sundowning overnight    afib  A/p- on xarelto 15mg daily, continue    Left left dorsal lump  A- tender on exam but pt states stable since last month; MR 6/17 showed findings not suspicious for neoplasm or infection but chronic nature would be fibroma or neuroma vs acute nature favored as superficial thrombophlebitis.  P  - monitor clinically, low threshold to re-image if progressive         Diet: Combination Diet Low Saturated Fat Na <2400mg Diet    DVT Prophylaxis: DOAC, Pneumatic Compression Devices and Ambulate every shift  Hayes Catheter: Not present  Lines: None     Cardiac Monitoring: None  Code Status:  Full Code      Clinically Significant Risk Factors Present on Admission                # Drug Induced Coagulation Defect: home medication list includes an anticoagulant medication    # Hypertension: Noted on problem list  # Chronic heart failure with reduced ejection fraction: last echo with EF <40%              Disposition Plan     Expected Discharge Date: 07/05/2023                  Chin Parisi MD  Hospitalist Service  [facility]  Securely message with [employer] (more info)  Text page via [employer] Paging/Directory   ______________________________________________________________________    Interval History   -no acute events  -on 3L, wondering about 02  - discussed ambulation trial  - discussed w/ bedside team on in-room MDRs  - pt reports some pain and irritation in mid back where recently diagnosed with yeast  - discussed miconazole powder and lotion  - answered all her questions to verbalized satisfaction     Physical Exam   Vital Signs: Temp: 97.4  F (36.3  C) Temp src: Oral BP: 124/59 Pulse: 63   Resp: 18 SpO2: 93 % O2 Device: Nasal cannula Oxygen Delivery: 3 LPM  Weight: 111 lbs 5.32 oz    General: alert, oriented, and in no acute distress  Pulmonary: improved compared to 7/3 exam- relatively clear, coarse at bases but no rales nor wheezes  CVS: regular rate and rhythm, no murmurs, rubs, or gallops; no blatant jugular venous distention; 1+pitting ankle edema and extremities are warm to the touch  GI: soft, nontender, BS+, no rebound or guarding, no conspicuous organomegaly   Neuro: nonfocal, moves all extremities of own volition  Psych: appropriate  Extremities: Extremities normal, atraumatic, no cyanosis but notable 1+ pitting tender bilateral feet and lower leg edema with a palpable lump skin lesion (approx 2-3 inches on dorsal aspect of left          Medical Decision Making       49 MINUTES SPENT BY ME on the date of service doing chart review, history, exam, documentation & further  activities per the note.      Data   ------------------------- PAST 24 HR DATA REVIEWED -----------------------------------------------    I have personally reviewed the following data over the past 24 hrs:    9.8  \   11.8   / 274     139 104 28 /  127 (H)   4.5 26 1.54 (H) \       Trop: N/A BNP: 200 (H)       Imaging results reviewed over the past 24 hrs:   Recent Results (from the past 24 hour(s))   XR Chest Port 1 View    Narrative    EXAM: XR CHEST PORT 1 VIEW  LOCATION: [facility]  DATE: 7/3/2023    INDICATION: dyspnea  COMPARISON: 06/22/2023, 06/17/2023 and older studies, CT chest 06/17/2023      Impression    IMPRESSION: Patchy linear and airspace opacities in the left lower lobe have continued to slightly decrease, consistent with resolving left lower lobe pneumonia. Marked emphysema is easier to appreciate on the CT as are the faint nodular opacities in the   right midlung.    No new parenchymal disease. Linear scarring in the left midlung along the fissure is unchanged. Heart and pulmonary vascularity are normal.

## 2023-07-05 NOTE — DISCHARGE SUMMARY
[facility]  Hospitalist Discharge Summary      Date of Admission:  7/3/2023  Date of Discharge:  7/5/2023  Discharging Provider: Chin Parisi MD  Discharge Service: Hospitalist Service    Discharge Diagnoses   Community-acquired pneumonia  Acute on chronic respiratory failure with hypoxia  History of heart failure with moderately reduced ejection fraction  History of breast cancer  Mid back yeast infection  Elevated creatinine     Clinically Significant Risk Factors          Follow-ups Needed After Discharge     Unresulted Labs Ordered in the Past 30 Days of this Admission     No orders found from 6/3/2023 to 7/4/2023.      These results will be followed up by [facility]    Discharge Disposition   Discharged to home with home cares  Condition at discharge: Stable    Hospital Course      Irene León is a 78 year old female admitted on 7/3/2023. She has a pertinent hx of COPD and CHF, recently admitted and discharged with new diuretics lasix 40mg po bid (hospitalized for copd exacerbation with hypevolema 6/25/23 and completed augmetin). Presentation of several days of increased lower extremity swelling and shortness of breath with hypoxia. CXR showed patchy linear and airspace opacities in the left lower lobe have continued to slightly decrease, consistent with resolving left lower lobe pneumonia; initial plan on day of admission was to discharge from ER but pt became hypoxic with persistent symptoms now requiring increased 02 at 4L NC; baseline is 2L. Started on doxycycline and ceftriaxone and admitted. CR 1.93, wbc 18.9, hgb 12.5, bnp 200 (vs 1800 in June).    Clinically improved, did not have diarrhea (the patient was worried about that), weaned from 4 L to 3 L on hospital day 1 and then on 7/5 back to 2 L baseline. She was noted with an ongoing yeast and skin irritation mid-back, so was started on miconazole powder to alternate with Cetaphil cream.  Upon discharge, she was resumed back on  nystatin cream.    She will be discharged with 5 more days of twice daily cefdinir and 3 more days of twice daily doxycycline and has an ongoing steroid taper (as per ER physician).  The patient remained hemodynamically and vitally intact.  Recommended to follow-up with her primary care provider within a week for a posthospitalization visit and patient should also follow-up longitudinally with cardiology and oncology.  The patient is arranged to have home cares including skilled nursing and physical and Occupational Therapy.     Consultations This Hospital Stay   CARE MANAGEMENT / SOCIAL WORK IP CONSULT  PHYSICAL THERAPY ADULT IP CONSULT  OCCUPATIONAL THERAPY ADULT IP CONSULT    Code Status   Full Code    Time Spent on this Encounter   I, Chin Parisi MD, personally saw the patient today and spent greater than 30 minutes discharging this patient.       Chin Parisi MD  [address] [address]  Phone: [phone]  Fax: [phone]  ______________________________________________________________________    Physical Exam   Vital Signs: Temp: 97.9  F (36.6  C) Temp src: Oral BP: 114/55 Pulse: 68   Resp: 18 SpO2: 94 % O2 Device: Nasal cannula Oxygen Delivery: 2 LPM  Weight: 111 lbs 5.32 oz    General: alert, oriented, and in no acute distress  Pulmonary:back to 2L NC (baseline), normal respiratory effort with speaking, relatively clear sounds without obvious wheezes or rales  CVS: regular rate and rhythm, no murmurs, rubs, or gallops; no blatant jugular venous distention; stable mild ankle edema and extremities are warm to the touch  GI: soft, nontender, BS+, no rebound or guarding, no conspicuous organomegaly   Neuro: nonfocal, moves all extremities of own volition  Psych: appropriate  Extremities: Extremities normal, atraumatic, no cyanosis but stable mild edema at bilateral feet and lower leg edema with a palpable lump skin lesion (approx 2-3  inches on dorsal aspect of left)        Primary Care Physician   Pankaj Montanez    Discharge Orders       Significant Results and Procedures   Results for orders placed or performed during the hospital encounter of 07/03/23   XR Chest Port 1 View    Narrative    EXAM: XR CHEST PORT 1 VIEW  LOCATION: [facility]  DATE: 7/3/2023    INDICATION: dyspnea  COMPARISON: 06/22/2023, 06/17/2023 and older studies, CT chest 06/17/2023      Impression    IMPRESSION: Patchy linear and airspace opacities in the left lower lobe have continued to slightly decrease, consistent with resolving left lower lobe pneumonia. Marked emphysema is easier to appreciate on the CT as are the faint nodular opacities in the   right midlung.    No new parenchymal disease. Linear scarring in the left midlung along the fissure is unchanged. Heart and pulmonary vascularity are normal.       Discharge Medications   Discharge Medication List as of 7/5/2023 11:12 AM      START taking these medications    Details   cefdinir (OMNICEF) 300 MG capsule Take 1 capsule (300 mg) by mouth daily for 5 days, Disp-5 capsule, R-0, E-PrescribeCompleting course of antibiotics from hospitalization, dose adjusted for renal function      predniSONE (DELTASONE) 20 MG tablet Take 3 tablets on day 1, then take 2 tablets on days 2 through 4, 1 tablet on day 5-6, half a tablet on day 7, then discontinue, Disp-15 tablet, R-0, E-Prescribe         CONTINUE these medications which have CHANGED    Details   doxycycline hyclate (VIBRAMYCIN) 100 MG capsule Take 1 capsule (100 mg) by mouth 2 times daily for 3 days, Disp-6 capsule, R-0, E-PrescribeCompleting course of antibiotics from hospitalization         CONTINUE these medications which have NOT CHANGED    Details   acetaminophen (TYLENOL) 500 MG tablet Take 1,000 mg by mouth every 8 hours as needed for mild pain or fever , Historical      albuterol (PROAIR HFA/PROVENTIL HFA/VENTOLIN HFA) 108 (90 Base)  MCG/ACT inhaler Inhale 2 puffs into the lungs every 6 hours as needed for shortness of breath, wheezing or cough, Historical      benzonatate (TESSALON PERLES) 100 MG capsule Take 1 capsule (100 mg) by mouth 3 times daily as needed for cough, Disp-90 capsule, R-3, E-Prescribe      CALCIUM-MAGNESIUM-ZINC PO Take 1 tablet by mouth daily, Historical      cetirizine (ZYRTEC) 10 MG tablet Take 10 mg by mouth daily as needed for allergies, Historical      chlorhexidine (PERIDEX) 0.12 % solution [CHLORHEXIDINE (PERIDEX) 0.12 % SOLUTION] Apply 15 mL to the mouth or throat at bedtime as needed. , Historical      famotidine (PEPCID) 20 MG tablet Take 1 tablet (20 mg) by mouth At Bedtime, Disp-90 tablet, R-3, E-Prescribe      Fluticasone-Umeclidin-Vilanterol (TRELEGY ELLIPTA) 200-62.5-25 MCG/INH oral inhaler Inhale 1 puff into the lungs At Bedtime, Historical      furosemide (LASIX) 40 MG tablet Take 1 tablet (40 mg) by mouth 2 times daily for 30 days, Disp-60 tablet, R-0, E-Prescribe      gabapentin (NEURONTIN) 600 MG tablet Take 1 tablet (600 mg) by mouth At Bedtime for 30 days, Disp-30 tablet, R-0, E-Prescribe      HYDROmorphone, STANDARD CONC, (DILAUDID) 1 MG/ML oral solution Take 1 mg by mouth 3 times daily, Historical      ipratropium (ATROVENT HFA) 17 MCG/ACT inhaler Inhale 2 puffs into the lungs every 6 hours as needed for wheezing, Historical      Melatonin 10 MG TABS tablet Take 10 mg by mouth At Bedtime, Historical      metoprolol succinate ER (TOPROL XL) 50 MG 24 hr tablet Take 1.5 tablets (75 mg) by mouth At Bedtime, Disp-135 tablet, R-1, E-Prescribe      mirabegron (MYRBETRIQ) 50 MG 24 hr tablet Take 1 tablet (50 mg) by mouth daily, Disp-90 tablet, R-1, E-Prescribe      multivitamin w/minerals (THERA-VIT-M) tablet Take 1 tablet by mouth daily, Historical      nystatin (MYCOSTATIN) 587577 UNIT/GM external cream Apply topically 3 times daily Until clear.Disp-30 g, [id number]      potassium chloride ER  (K-TAB/KLOR-CON) 10 MEQ CR tablet Take 1 tablet by mouth daily, Historical      rivaroxaban ANTICOAGULANT (XARELTO) 15 MG TABS tablet Take 1 tablet (15 mg) by mouth At Bedtime, Historical      simvastatin (ZOCOR) 40 MG tablet Take 1 tablet (40 mg) by mouth At Bedtime, Disp-90 tablet, R-3, E-Prescribe      solifenacin (VESICARE) 5 MG tablet Take 1 tablet (5 mg) by mouth daily, Disp-90 tablet, R-1, E-Prescribe      spironolactone (ALDACTONE) 25 MG tablet Take 0.5 tablets (12.5 mg) by mouth At Bedtime for 60 days, Disp-15 tablet, R-1, E-Prescribe      tamoxifen (NOLVADEX) 20 MG tablet Take 1 tablet (20 mg) by mouth daily, Disp-90 tablet, R-3, E-Prescribe      traMADol (ULTRAM) 50 MG tablet Take 50 mg by mouth 2 times daily, Historical      vitamin D3 (CHOLECALCIFEROL) 50 mcg (2000 units) tablet Take 1 tablet by mouth daily, Historical           Allergies   Allergies   Allergen Reactions     Sulfa (Sulfonamide Antibiotics) [Sulfa Antibiotics] Rash     Headaches and dizziness.     Homeopathic Drugs [External Allergen Needs Reconciliation - See Comment] Unknown     runny nose     Mold Extracts [Molds & Smuts] Unknown     Morphine (Pf) [Morphine] Unknown     hallucinate     Lisinopril Itching, Cough and Unknown     cough     Sulfacetamide Sodium [Sulfacetamide] Rash

## 2023-07-05 NOTE — PROGRESS NOTES
07/05/23 1001   Appointment Info   Signing Clinician's Name / Credentials (OT) ROCCO Garduno   Living Environment   People in Home spouse   Current Living Arrangements house   Transportation Anticipated family or friend will provide   Living Environment Comments Pt has cane, FWW, walk-in shower w/ shower chair, regular toilet, multi-level home   Self-Care   Usual Activity Tolerance moderate   Current Activity Tolerance moderate   Equipment Currently Used at Home cane, straight;shower chair;walker, rolling   Fall history within last six months no   Activity/Exercise/Self-Care Comment Pt IND w/ ADLs and shares IADLs w/    General Information   Onset of Illness/Injury or Date of Surgery 07/03/23   Referring Physician Chin Parisi MD   Patient/Family Therapy Goal Statement (OT) ready to go home today   Additional Occupational Profile Info/Pertinent History of Current Problem Irene León is a 78 year old female admitted on 7/3/2023. She has a pertinent hx of COPD and CHF, recently admitted and discharged with new diuretics lasix 40mg po bid (hospitalized for copd exacerbation with hypevolema 6/25/23   Existing Precautions/Restrictions oxygen therapy device and L/min   Cognitive Status Examination   Orientation Status orientation to person, place and time   Sensory   Sensory Quick Adds sensation intact   Pain Assessment   Patient Currently in Pain No   Posture   Posture not impaired   Range of Motion Comprehensive   General Range of Motion bilateral upper extremity ROM WFL   Strength Comprehensive (MMT)   General Manual Muscle Testing (MMT) Assessment no strength deficits identified   Bed Mobility   Comment (Bed Mobility) sleeps in recliner at baseline   Transfers   Transfers toilet transfer;shower transfer   Transfer Comments SBA for transfers   Activities of Daily Living   BADL Assessment/Intervention bathing;toileting   Bathing Assessment/Intervention   [place] Level (Bathing) supervision    Toileting   [place] Level (Toileting) supervision   Clinical Impression   Criteria for Skilled Therapeutic Interventions Met (OT) Yes, treatment indicated   OT Diagnosis decrease ADLs   Influenced by the following impairments pneumonia   OT Problem List-Impairments impacting ADL activity tolerance impaired   Assessment of Occupational Performance 1-3 Performance Deficits   Identified Performance Deficits transfers, standing ADLs, endurance   Planned Therapy Interventions (OT) ADL retraining;IADL retraining;transfer training   Clinical Decision Making Complexity (OT) low complexity   Risk & Benefits of therapy have been explained evaluation/treatment results reviewed;patient   OT Total Evaluation Time   OT Eval, Low Complexity Minutes (94733) 10   OT Goals   Therapy Frequency (OT) One time eval and treatment   OT Predicted Duration/Target Date for Goal Attainment 07/05/23   OT Goals Lower Body Dressing;Toilet Transfer/Toileting   OT: Lower Body Dressing Modified independent;Goal Met;Completed   OT: Toilet Transfer/Toileting Modified independent;toilet transfer;cleaning and garment management;Goal Met;Completed   Self-Care/Home Management   Self-Care/Home Mgmt/ADL, Compensatory, Meal Prep Minutes (69922) 10   Symptoms Noted During/After Treatment (Meal Preparation/Planning Training) shortness of breath   Treatment Detail/Skilled Intervention Pt on 3L O2 throghout session. Pt had just completed FB dressing IND. Pt STS w/ SBA and amb. 15 ft to BR w/ FWW and SBA. Edu on safe toilet transfers - completed Mod I. Pt edu on EC strategies for home - pt verbalized understanding. Pt amb. 50 ft in room w/ FWW and SBA, no LOB but SOB after activity. Discussed DME and home setup - all questions answered. Pt ended session in recliner w/ all needs w/in reach.   OT Discharge Planning   OT Plan DC OT   OT Discharge Recommendation (DC Rec) (S)  home with home care occupational therapy;home with assist   OT Rationale for DC Rec  Pt tolerating therapy well w/ good home setup. Pt has  at home to assist as needed w/ ADLs and IADLs. Due to recent rehospitalization, recommending home care to increase safety and activity tolerance for ADLs and IADLs.   OT Brief overview of current status Mod I for ADLs; decreased activity tolerance   Total Session Time   Timed Code Treatment Minutes 10   Total Session Time (sum of timed and untimed services) 20

## 2023-07-05 NOTE — PROGRESS NOTES
Care Management Discharge Note    Discharge Date: 07/05/2023       Discharge Disposition: Home Care    Discharge Services: Transportation Services    Discharge DME: Oxygen    Discharge Transportation: family or friend will provide    Private pay costs discussed: transportation costs-with daughterLeelee    Does the patient's insurance plan have a 3 day qualifying hospital stay waiver?  No    Education Provided on the Discharge Plan:  Yes-patient and daughterLeelee.  Persons Notified of Discharge Plans: yes patient and daughter Leelee  Patient/Family in Agreement with the Plan: yes    Handoff Referral Completed: Yes    Additional Information:  Writer updated bedside RN-Discharge orders placed and sent to [facility](accepted by [facility])    Writer spoke to daughterLeelee, who reports due to conflict with patient, Leelee is declining to transport and would like writer to arrange WC ride to home between [id number] is aware will be PP cost, Leelee states she manages finances and no problem with a bill.    [facility] WC with oxygen 2L NC to home between 12-1pm.     UPDATE:  Patient does not want [facility] WC ride and will plan on her sister, Yuliana, transport to patient home today 12-1pm.  Patient has portable working oxygen tank in room.   Writer cancelled transportation.     Renata Mane, DICK

## 2023-07-05 NOTE — PROGRESS NOTES
Documentation of Face to Face and Certification for Home Health Services    I certify that patient: Irene León is under my care and that I, or a nurse practitioner or physician's assistant working with me, had a face-to-face encounter that meets the physician face-to-face encounter requirements with this patient on: 7/5/2023 .    This encounter with the patient was in whole, or in part, for the following medical condition, which is the primary reason for home health care:   Patient Active Problem List   Diagnosis     Left hip pain     Nonischemic cardiomyopathy (H)     COPD (chronic obstructive pulmonary disease) (H)     Benign essential hypertension     Chronic kidney disease, stage IV (severe) (H)     Factor 5 Leiden mutation, heterozygous (H)     Senile osteoporosis     COPD exacerbation (H)     Community acquired pneumonia of left upper lobe of lung     Acute on chronic diastolic congestive heart failure (H)     Atrial fibrillation with rapid ventricular response (H)     Weight loss     Acute on chronic respiratory failure with hypoxia (H)     Prolonged Q-T interval on ECG     Neutrophilia     Abscess of upper lobe of left lung with pneumonia (H)     Invasive ductal carcinoma of breast, female, left (H)     ACP (advance care planning)     Anisocoria     At high risk for impaired skin integrity     Trochanteric bursitis     Chronic anticoagulation     Chronic systolic congestive heart failure (H)     Closed fracture of hip with delayed healing     Compression fracture of L1 vertebra, sequela     Depression with anxiety     Dyslipidemia     Dyspnea     Exacerbation of intermittent asthma, unspecified asthma severity     Family history of blood disease     Fibrocystic breast     Fissure in skin of foot     Fracture of femur (H)     Gait disorder     GERD (gastroesophageal reflux disease)     Herniated intervertebral disc     Groin pain     Herpes zoster without complication     High frequency sensorineural  hearing loss of left ear     Patient's other noncompliance with medication regimen     Hot flashes due to tamoxifen     Hot flashes, menopausal     Hyperlipidemia     Hypoxia     Idiopathic edema     Insomnia     Malignant neoplasm of central portion of left female breast (H)     Metabolic encephalopathy     Unspecified asthma, uncomplicated     Muscle weakness (generalized)     OAB (overactive bladder)     Osteoarthritis of hip     Osteoporosis     Other abnormalities of gait and mobility     Other chronic sinusitis     Pain due to fracture     Personal history of malignant neoplasm of breast     Personal history of transient ischemic attack (TIA), and cerebral infarction without residual deficits     Physical deconditioning     Piriformis syndrome     Post menopausal syndrome     Pulsatile tinnitus of both ears     S/P hip replacement, left     Sacro-iliac pain     Sacral insufficiency fracture     T12 compression fracture (H)     Tendonitis     Urge incontinence     Urinary incontinence     Wedge compression fracture of t11-T12 vertebra, subsequent encounter for fracture with routine healing     Heart failure with reduced ejection fraction (H)     Leukocytosis, unspecified type     Acute pneumonia     Pedal edema     Chronic combined systolic and diastolic congestive heart failure (H)     Pneumonia of left upper lobe due to infectious organism       I certify that, based on my findings, the following services are medically necessary home health services: Nursing, Occupational Therapy and Physical Therapy.    My clinical findings support the need for the above services because: Nurse is needed: For complex aftercare of surgical procedures because the patient needs instruction and cannot perform care on their own due to: copd, chf, and renal impairment, ensure completion of antibiotics and follow up longitudinally with providers.., Occupational Therapy Services are needed to assess and treat cognitive ability and  address ADL safety due to impairment in pneumonia, chf and weakness . and Physical Therapy Services are needed to assess and treat the following functional impairments: pneumonia, chf and weakness .    Further, I certify that my clinical findings support that this patient is homebound (i.e. absences from home require considerable and taxing effort and are for medical reasons or [employer] services or infrequently or of short duration when for other reasons) because: Leaving home is medically contraindicated for the following reason(s): Dyspnea on exertion that makes it so they cannot leave their home for needed services without clinical deterioration...    Based on the above findings. I certify that this patient is confined to the home and needs intermittent skilled nursing care, physical therapy and/or speech therapy.  The patient is under my care, and I have initiated the establishment of the plan of care.  This patient will be followed by a physician who will periodically review the plan of care.  Physician/Provider to provide follow up care: Pankaj Montanez    Attending hospital physician (the Medicare certified [facility] provider): Chin Parisi MD  Physician Signature: See electronic signature associated with these discharge orders.  Date: 7/5/2023

## 2023-07-05 NOTE — PLAN OF CARE
Problem: Plan of Care - These are the overarching goals to be used throughout the patient stay.    Goal: Optimal Comfort and Wellbeing  Outcome: Progressing   Goal Outcome Evaluation:      O2 at 2L; baseline. VSS. No c/o pain. Pt will discharge to home; sister will provide transportation. Discussed outpatient follow up for insomnia. Pt independent and calls appropriately when needed.

## 2023-07-05 NOTE — PLAN OF CARE
Problem: Plan of Care - These are the overarching goals to be used throughout the patient stay.    Goal: Absence of Hospital-Acquired Illness or Injury  Intervention: Prevent Skin Injury  Recent Flowsheet Documentation  Taken 7/5/2023 0036 by Amalia Webster RN  Body Position:    position changed independently    weight shifting     Problem: Plan of Care - These are the overarching goals to be used throughout the patient stay.    Goal: Optimal Comfort and Wellbeing  Outcome: Progressing  Intervention: Monitor Pain and Promote Comfort  Recent Flowsheet Documentation  Taken 7/5/2023 0036 by Amalia Wbester RN  Pain Management Interventions: medication (see MAR)   Goal Outcome Evaluation:         Pt is alert and oriented x4. C/o pain to lower back 3/10. Requested for Tramadol and Tylenol together. Pt verbalized she does not her pain to creep back up. Pain was down to 1 until now. Pt unable to sleep despite having Melatonin at bedtime. She was able to sleep for an hour after taking Zyrtec. Gave lavender inhalation too to promote sleep. Tessalon Maritza administered for dry, congested cough.  VSS. Soft BP taken at right arm. O2 at 2LPM via nasal cannula maintained. Possible discharge to home today w/ home care.

## 2023-07-05 NOTE — TELEPHONE ENCOUNTER
Noted.  ------------  Gaby Chilel MD Gorshe, Maureen, RN 12 hours ago (8:03 PM)     KL  I placed the order. Looks like she is an inpatient at .     [facility]

## 2023-07-05 NOTE — PROGRESS NOTES
Occupational Therapy Discharge Summary    Reason for therapy discharge:    Discharged to home with home therapy.    Progress towards therapy goal(s). See goals on Care Plan in [facility] electronic health record for goal details.  Goals met    Therapy recommendation(s):    Continued therapy is recommended.  Rationale/Recommendations:  Ensure safe transition home w/ all I/ADLs.

## 2023-07-05 NOTE — PROGRESS NOTES
Physical Therapy Discharge Summary    Reason for therapy discharge:    Discharged to home.    Progress towards therapy goal(s). See goals on Care Plan in [facility] electronic health record for goal details.  Goals met    Therapy recommendation(s):    No further therapy is recommended.

## 2023-07-06 NOTE — TELEPHONE ENCOUNTER
Message sent to [facility] Procedure Pool with CA P.PCI recommendations and RENAL, O2 dependent, COPD, Xarelto 48 hour hold Alerts.  Pre-procedure lab orders placed.       Gaby Chilel MD  Cardiology  Nonischemic cardiomyopathy (H) +4 more  Dx     Orders Placed     Case Request Cath Lab: Coronary Angiogram, Percutaneous Coronary Intervention

## 2023-07-06 NOTE — PROGRESS NOTES
Clinic Care Coordination Contact  [facility]: Post-Discharge Note  SITUATION                                                      Admission:    Admission Date: 07/03/23   Reason for Admission: Leg Swelling  Discharge:   Discharge Date: 07/05/23  Discharge Diagnosis: Community-acquired pneumonia  Acute on chronic respiratory failure with hypoxia  History of heart failure with moderately reduced ejection fraction  History of breast cancer  Mid back yeast infection  Elevated creatinine    BACKGROUND                                                      Per hospital discharge summary and inpatient provider notes:  Irene León is a 78 year old female admitted on 7/3/2023. She has a pertinent hx of COPD and CHF, recently admitted and discharged with new diuretics lasix 40mg po bid (hospitalized for copd exacerbation with hypevolema 6/25/23 and completed augmetin). Presentation of several days of increased lower extremity swelling and shortness of breath with hypoxia. CXR showed patchy linear and airspace opacities in the left lower lobe have continued to slightly decrease, consistent with resolving left lower lobe pneumonia; initial plan on day of admission was to discharge from ER but pt became hypoxic with persistent symptoms now requiring increased 02 at 4L NC; baseline is 2L. Started on doxycycline and ceftriaxone and admitted. CR 1.93, wbc 18.9, hgb 12.5, bnp 200 (vs 1800 in June).     Clinically improved, did not have diarrhea (the patient was worried about that), weaned from 4 L to 3 L on hospital day 1 and then on 7/5 back to 2 L baseline. She was noted with an ongoing yeast and skin irritation mid-back, so was started on miconazole powder to alternate with Cetaphil cream.  Upon discharge, she was resumed back on nystatin cream.     She will be discharged with 5 more days of twice daily cefdinir and 3 more days of twice daily doxycycline and has an ongoing steroid taper (as per ER physician).  The  patient remained hemodynamically and vitally intact.  Recommended to follow-up with her primary care provider within a week for a posthospitalization visit and patient should also follow-up longitudinally with cardiology and oncology.  The patient is arranged to have home cares including skilled nursing and physical and Occupational Therapy.    ASSESSMENT           Discharge Assessment  How are you doing now that you are home?: I'm feeling pretty tired but I'm doing okay.  I haven't heard from Home Care yet but I have their number and I will call them tomorrow if I haven't heard from them.  How are your symptoms? (Red Flag symptoms escalate to triage hotline per guidelines): Improved  Do you feel your condition is stable enough to be safe at home until your provider visit?: Yes  Does the patient have their discharge instructions? : Yes  Does the patient have questions regarding their discharge instructions? : No  Were you started on any new medications or were there changes to any of your previous medications? : Yes  Does the patient have all of their medications?: Yes  Do you have questions regarding any of your medications? : No  Do you have all of your needed medical supplies or equipment (DME)?  (i.e. oxygen tank, CPAP, cane, etc.): Yes  Discharge follow-up appointment scheduled within 14 calendar days? : Yes  Discharge Follow Up Appointment Date: 07/12/23  Discharge Follow Up Appointment Scheduled with?: Primary Care Provider    Post-op ([facility] CTA Only)  If the patient had a surgery or procedure, do they have any questions for a nurse?: No    Post-op (Clinicians Only)  Did the patient have surgery or a procedure: No  Eating & Drinking: eating and drinking without complaints/concerns  PO Intake: regular diet    Spoke with Stephanie.  See note above.  Denies any questions regarding medications or needs in the home.  Confirmed appointments below.    PLAN                                                      Outpatient  Plan:  Patient to attend appointments listed below.    Future Appointments   Date Time Provider Department Center   7/12/2023  9:20 AM [facility]   7/24/2023  9:50 AM [facility]   7/25/2023 10:30 AM Samir Larson MD [facility]   8/8/2023 10:00 AM [facility] PFT RM 1 [facility]   8/15/2023  2:00 PM [facility]         For any urgent concerns, please contact our 24 hour nurse triage line: [phone] ([phone])         Anushka Morales RN

## 2023-07-06 NOTE — TELEPHONE ENCOUNTER
Mika Winter Kelly, RN  HI THERE PATIENT DECLINED RAC APPT, SHE WILL JUST SEE ALISHA ON 7/24, PER PATIENT. THANK YOU, MIKA WINTER           Previous Messages       ----- Message -----   From: Leelee Campa RN   Sent: 7/6/2023  11:49 AM CDT   To: [facility] Scheduling Registration Pool - [facility]   Subject: RAC                                               Hi - Please call patient to arrange RAC as ordered on discharge.   Thank you!!

## 2023-07-07 NOTE — TELEPHONE ENCOUNTER
Home Health Care    Reason for call:  Delay of care for PT from 07/07/2023 until 07/10/2023 per Pt's request. This message is FYI to PCP.    Pt Provider: Pankaj Montanez MD    Phone Number Homecare Nurse can be reached at: [phone] Ileana or Cathi    Can we leave a detailed message on this number? YES    Anita White

## 2023-07-10 NOTE — TELEPHONE ENCOUNTER
07/10/23  Pt only needs about 5 pills of each med as she is completely out, then pt [facility] the [facility].  Gunjan

## 2023-07-10 NOTE — TELEPHONE ENCOUNTER
Spoke with Stephanie, doing well, no questions for me to day, PT, call kept brief. Reminded when we will call again and to call before if there is a question.  Safia Davila, RT, CTTS, Chronic Pulmonary Disease Specialist

## 2023-07-10 NOTE — TELEPHONE ENCOUNTER
Home Care is calling regarding an established patient with [facility].     Requesting orders from: Pankaj Montanez  Provider is following patient: Yes  Is this a 60-day recertification request?  No    Orders Requested    Physical Therapy  Request for initial certification (first set of orders) Frequency: 1x/wk for 4 wks, then 1x every other week for 4 weeks.    Skilled Nursing  Request for initial evaluation and treatment (one time)   Occupational Therapy  Request for initial evaluation and treatment (one time)   Social Work  Request for initial evaluation and treatment (one time)   Information was gathered and will be sent to provider for review.  RN will contact Home Care with information after provider review.    Pt has visit scheduled 7/12/23. Pt scored high on PHQ-9 at PT eval and is not on any medications. Pt would like to discuss sleep problem at visit.    Drug interactions:   Tamoxifen and Myrbetriq  Potassium and Spironolactone  Potassium and Solifenacin      Confirmed ok to leave a detailed message with call back.  Contact information confirmed and updated as needed.    Saari Gaston RN

## 2023-07-10 NOTE — TELEPHONE ENCOUNTER
"Voicemail received on device nurse line stating she was recently admitted to Hospital and seen by Dr. Heath. States she remembers something about \"following up with a doctor to see if I qualify for a pacemaker but cannot find the numbers to call.\" States she was not sure what else she was supposed to be doing after discharge either.     Telephone call noted from 6/26/23 shows orders placed for HF LENA/Dr. Chilel/ And EP consults.     Will forward to scheduling to review schedule rest of appts, as appears only HF LENA is scheduled.     Renetta White RN    "

## 2023-07-11 NOTE — TELEPHONE ENCOUNTER
"Anabela @ [facility] returned call for verbal home care orders. [phone].    TC relayed message from Dr Montanez \"okay for those home care orders\".    Anabela took my name and said she will just use that as a \"verbal\".    Nothing else needed at this time.    Anita White    "

## 2023-07-11 NOTE — TELEPHONE ENCOUNTER
Attempted to contact, invalid number listed.     Contacted [facility] and they will call clinic.     Route call to nurse line

## 2023-07-12 NOTE — PROGRESS NOTES
Irene León   78 year old female    Date of Visit: 7/12/2023    Chief Complaint   Patient presents with     Follow Up     Hospital follow up- [facility]   Here with daughter, Leelee Valencia  78-year-old female with severe COPD and right upper lobe opacity which is concerning, with follow-up imaging later this month.    She was just hospitalized on July 3 until July 5 for left lower lobe pneumonia with hypoxia.  Treated with doxycycline and ceftriaxone, now off antibiotics.  She had some mild diarrhea which is now resolved.    July 3 chest x-ray shows resolving left lower lobe pneumonia.  Her cough is improved.  No longer having fevers.  Shortness of breath is returned to baseline.  She is finished her 1 week course of prednisone.    June 23 heart echo showed ejection fraction 20-25% with moderate global hypokinesis and severe diastolic dysfunction but no valve problems.  Heart echo April 2022 showed ejection fraction 35-40%    He does have a history of nonobstructive coronary disease in the past.  Negative stress test in 2016.  She has not had a history of ischemic event.    Cardiology had recommended a angiogram for further evaluation of her heart failure and consideration of a dual-chamber pacemaker.    While in the hospital patient did have worsening of her kidney function.  She has mild to moderate chronic renal insufficiency in the past, but creatinine is up to 1.7 on July 5.    Patient did have her furosemide increased from 20 mg a day, now up to 40 mg twice a day.  Still on spironolactone 12.5 mg daily.    On July 4 she had normal hemoglobin and sodium.    Testing for influenza and COVID was negative.    He did have elevated BNP level earlier this summer but proBNP level in July was down to 200.    Her edema has resolved.    She has not been having chest pain or chest pressure.    Occipital stroke with high-grade intracranial stenosis.  June 2023 brain MRI showed the old stroke and left posterior  cerebral artery progressive occlusion.  Significant atrophy seen.  No new stroke seen.    Chronic back pain with T12 compression fracture over a year ago.  She is now on Dilaudid 1 mg 3 times a day.  She has used Butrans in the past.  She has used gabapentin.  She does feel benefit of the narcotic with her pain management.  She denies constipation.    She has chronic atrial fibrillation on Xarelto and metoprolol 75 mg a day.    Stage I breast cancer with lumpectomy and radiation 2017.  Negative mammogram in June of this year.  On tamoxifen.    June 2023 LS spine MRI did not show central canal stenosis but there was some right L5 foraminal stenosis but she is not complaining of right leg radicular pain at this time.    She has been eating some boost and Savona instant breakfast.  Still chronic nausea and poor appetite.  Quite thin.  She is up 5 pounds in August 2022, however.    History of chronic bladder spasms but denies urinary tract infection symptoms or dysuria.  She does use bladder relaxant medication, dual therapy previously.    PMHx:    Past Medical History:   Diagnosis Date     ANATOLIY (acute kidney injury) (H)      Allergic rhinitis      Arrhythmia      Atrial fibrillation with RVR (H)      Bacteremia      Breast cancer (H) 2017     Cardiomyopathy (H)      Centrilobular emphysema (H)      CHF (congestive heart failure) (H)      CKD (chronic kidney disease)      COPD (chronic obstructive pulmonary disease) (H)      Coronary artery disease      Depression      Dysphagia      E. coli sepsis (H)      Factor 5 Leiden mutation, heterozygous (H)      GERD (gastroesophageal reflux disease)      Heart failure with reduced ejection fraction (H) 4/17/2023     Hx of radiation therapy 2017     Hyperlipidemia      Hypertension      Hypokalemia      Hypomagnesemia      Idiopathic cardiomyopathy (H)      Left hip pain 4/30/2014     OAB (overactive bladder)      Osteoporosis      Sacral insufficiency fracture      Sinusitis       Syncope      Urge incontinence      Vocal cord dysfunction      PSHx:    Past Surgical History:   Procedure Laterality Date     ARTHROPLASTY REVISION HIP Left      BIOPSY BREAST Right 2017     BLADDER SURGERY      bladder interstim with removal     CARDIAC CATHETERIZATION       CATARACT EXTRACTION Left 07/18/2017     IR ABDOMINAL AORTOGRAM  4/16/2003     IR AORTIC ARCH 4 VESSEL ANGIOGRAM  4/16/2003     IR MISCELLANEOUS PROCEDURE  4/16/2003     LUMPECTOMY BREAST Left 06/2017    x2     PICC DOUBLE LUMEN PLACEMENT  4/11/2022          PICC TRIPLE LUMEN PLACEMENT  4/7/2022          ZZC OPEN TX FEMORAL FRACTURE DISTAL MED/LAT CONDYLE Left 10/28/2015    Procedure: OPEN REDUCTION INTERNAL FIXATION LEFT  PROXIMAL FEMUR PERIPROSTHETIC FRACTURE;  Surgeon: Yovanny Albarran MD;  Location: [facility];  Service: Orthopedics     Immunizations:   Immunization History   Administered Date(s) Administered     COVID-19 Bivalent 12+ (Pfizer) 11/16/2022     COVID-19 MONOVALENT 12+ (Pfizer) 03/02/2021, 03/23/2021, 12/08/2021     FLU 6-35 months 11/03/2003     Flu 65+ Years 09/20/2017, 09/25/2018, 10/10/2019     Flu, Unspecified 10/01/2016, 10/01/2018     [id number] Novel Flu 01/05/2010     Influenza (H1N1) 01/05/2010     Influenza (High Dose) 3 valent vaccine 10/30/2014, 10/01/2015, 11/26/2016, 09/25/2018, 10/10/2019, 10/19/2020     Influenza (IIV3) PF 11/03/2003, 11/08/2006, 10/24/2007, 11/28/2008, 10/09/2009, 10/22/2010, 01/26/2012, 10/03/2012, 11/01/2013     Influenza Vaccine 65+ (Fluzone HD) 10/19/2020, 12/15/2020, 11/16/2022     Mantoux Tuberculin Skin Test 12/13/2007, 10/24/2020, 11/03/2020     Pneumo Conj 13-V (2010&after) 05/11/2015     Pneumococcal 23 valent 11/24/1997, 11/21/2007, 05/17/2017     TD,PF 7+ (Tenivac) 07/20/2004     TDAP (Adacel,Boostrix) 02/12/2016     TDAP Vaccine (Boostrix) 02/12/2016     Zoster recombinant adjuvanted (SHINGRIX) 07/31/2020     Zoster vaccine, live 02/18/2009, 10/20/2014       MATT PRAKASH  "comprehensive review of systems was performed and was otherwise negative    Medications, allergies, and problem list were reviewed and updated    Exam  /70 (BP Location: Right arm, Patient Position: Sitting)   Pulse (!) 43   Temp 98.1  F (36.7  C)   Resp 18   Ht 1.499 m (4' 11.02\")   Wt 49.9 kg (110 lb)   SpO2 97%   BMI 22.21 kg/m    Thin frail female with moderate kyphosis.  Decreased breath sounds throughout but no wheezing, I did not hear crackles today.  There is no bibasilar crackles.  No dullness.  There is trace almost no lower extremity edema on her ankles.  There is some bruising and scratches but no evidence of cellulitis on the ankles.  Heart is regular I did not hear a murmur or gallop.  Abdomen is soft nontender.  Patient can stand but with assistance.  Gluteal skin examined with moderate intertrigo of the gluteal crease but there is no skin breakdown or evidence of fungal intertrigo.  Expect some mild irritative skin that is resolving.    Assessment/Plan  1. Chronic combined systolic and diastolic congestive heart failure (H)  Significant worsening of ejection fraction on last month's echo.  Global hypokinesis.  I suspect her pulmonary condition with pulmonary hypertension and possible sleep apnea is likely the main contributing factor to the reduction in ejection fraction.    She may have some ischemic heart disease, she had nonobstructive coronary disease on previous evaluation.    Cardiology had recommended a cardiac angiogram to further evaluate for coronary disease in light of reduced ejection fraction.  There is also some discussion about dual-chamber pacemaker, which I did discuss with patient as a potential benefit.    Patient is worried about risk of the angiogram, especially with her recent worsening of kidney function.  I did discuss that there was the risk of acute kidney injury or even kidney failure, in conjunction with her heart failure that may be a terminal event for her " if she has a kidney complication of the angiogram.    She has not had a known heart attack or known ischemic event.    She wishes to delay her angiogram until she can further recover from her recent pneumonia and get her kidney labs reevaluated.    I would suggest rechecking the heart echo to see how much of the reduced ejection fraction was related to her declining physical condition and pulmonary exacerbation that was occurring at that time.    She may be also having an element of sleep apnea with her severe kyphosis and lung disease and pulmonary hypertension and use of narcotics.  And that may worsen her ejection fraction.    She is currently fairly well compensated with resolution of the edema and no evidence of pulmonary edema.  Her BNP also was quite decreased earlier this month compared to previous baseline.    Continue current diuretics with spironolactone 12.5 mg a day and Lasix 40 mg twice a day at this time, but if significant worsening creatinine or electrolyte disturbance, she could reduce the furosemide down to 40 mg once a day.  Follow-up in 3 weeks.    2. Coronary artery disease due to calcified coronary lesion  No chest pain or history of ischemic event.  Patient wishes to delay her angiogram, over concerns that the angiogram risk may outweigh the benefit    She is on simvastatin 40 mg a day.  He is on Xarelto anticoagulant and metoprolol.    3. Chronic obstructive pulmonary disease, unspecified COPD type (H)  Severe end-stage COPD.  Pulmonary hypertension.  Requires oxygen.  Recent pneumonia, finished antibiotics and prednisone taper.    Continue current inhalers and oxygen.  Yanygy Virgilio.    We did discuss that the severity of her lung and heart condition is likely terminal within the next 6 to 12 months.  Patient has significant discomfort with musculoskeletal pain issues, and may wish to consider increased pain medication or antianxiety medication in the future which may affect breathing.   She has already benefited from the Dilaudid for pain management at this time.  I did discuss that use of chronic narcotics may increase risk of death, but the benefit likely outweighs the risk given her level of discomfort.    We did discuss CODE STATUS and hospice and palliative care options with patient today.  Daughter, Leelee, did feel that she was appropriate for hospice and should consider.  Patient stated that she wanted to consider that.  Patient will follow-up with me in a few weeks.    Patient is appropriate for DNR/DNI and hospice consult.    4. Pneumonia of left lower lobe due to infectious organism  Improved.  She has finished the antibiotics and prednisone.  Resolving pneumonia on chest x-ray on July 3.    She does have a history of right upper lobe opacity with follow-up planned for later this month.  Possibility for lung cancer.  Await findings of that imaging.    5. Urinary frequency  Chronic urinary frequency and spasms.  I did discuss risk of urinary tract infection with use of these medications which patient accepts and she wishes to continue on these medications which were refilled  - mirabegron (MYRBETRIQ) 50 MG 24 hr tablet; Take 1 tablet (50 mg) by mouth daily  Dispense: 90 tablet; Refill: 3  - solifenacin (VESICARE) 5 MG tablet; Take 1 tablet (5 mg) by mouth daily  Dispense: 90 tablet; Refill: 3    6. Intertrigo  Buttock intertrigo.  Peers significantly improved from previous description.  No skin breakdown.  Does not appear to be a yeast related issue at this time and does not need to continue with the the antifungal.  I recommended a topical barrier type ointment for diaper rash type condition.  - zinc oxide (DESITIN) 40 % external ointment; Apply topically as needed for dry skin or irritation (Twice a day to gluteal area skin irritation)  Dispense: 113 g; Refill: 1    7. Chronic renal insufficiency, stage 3 (moderate) (H)  Significant worsening of creatinine with recent hospital stay.  I  did discuss risk of kidney injury or even kidney failure with angiogram dye.    Check kidney labs today.  Consider reduction of furosemide to daily if significant worsening of electrolytes or creatinine.  - Basic metabolic panel    8. Medication monitoring encounter  As above  - Basic metabolic panel    9. Right upper lobe pulmonary nodule  As above with plan for reimaging later this month through the pulmonary clinic    10. History of stroke  No new event on MRI last month.  Known severe vascular disease.  On simvastatin and Xarelto blood thinner    11. History of breast cancer  No evidence of recurrence and negative mammogram last month.  Previous breast radiation in 2017    12. Compression fracture of T12 vertebra with routine healing, subsequent encounter  Chronic back pain.  Chronic kyphosis.  Pain is improved with the Dilaudid.  I did discuss risk of narcotic treatment, but benefit likely outweighs the risk given her prognosis.  Patient does wish to continue narcotic therapy despite the risk.  She got her the Dilaudid through the radiation oncologist, and should continue to get narcotics through 1 clinic.    She does have a history of long QTc syndrome but no history of V. tach.    Paroxysmal atrial fibrillation on Xarelto and metoprolol.    Chronic insomnia.  Using melatonin at this time.  Could consider benzodiazepines or additional medication if she does choose the palliative care/hospice treatment plan.    Time with patient greater than 40 minutes, with extensive discussion of her medical condition and discussion of prognosis and treatment plan and palliative care options.  Extensive time with review of chart, including heart echo review with patient and family.      Return in about 2 weeks (around 7/26/2023) for Clinic follow-up.   Patient Instructions   Do not proceed with angiogram at this time.  Consider repeat heart echo later this summer.    Continue on current medications at this time, although you  may be contacted to reduce your furosemide, depending on your kidney labs.    Follow-up with me in 2 weeks for further discussion on your conditions.    Proceed with further lung evaluation as planned this month.    You can use Desitin cream or other diaper rash type product in your buttock area on a regular basis until better.    Consider changing your CODE STATUS to DNR/DNI and requesting a hospice consult.        Pankaj Montanez MD, MD        Current Outpatient Medications   Medication Sig Dispense Refill     acetaminophen (TYLENOL) 500 MG tablet Take 1,000 mg by mouth every 8 hours as needed for mild pain or fever        albuterol (PROAIR HFA/PROVENTIL HFA/VENTOLIN HFA) 108 (90 Base) MCG/ACT inhaler Inhale 2 puffs into the lungs every 6 hours as needed for shortness of breath, wheezing or cough       benzonatate (TESSALON PERLES) 100 MG capsule Take 1 capsule (100 mg) by mouth 3 times daily as needed for cough 90 capsule 3     CALCIUM-MAGNESIUM-ZINC PO Take 1 tablet by mouth daily       cetirizine (ZYRTEC) 10 MG tablet Take 10 mg by mouth daily as needed for allergies       chlorhexidine (PERIDEX) 0.12 % solution [CHLORHEXIDINE (PERIDEX) 0.12 % SOLUTION] Apply 15 mL to the mouth or throat at bedtime as needed.        famotidine (PEPCID) 20 MG tablet Take 1 tablet (20 mg) by mouth At Bedtime 90 tablet 3     Fluticasone-Umeclidin-Vilanterol (TRELEGY ELLIPTA) 200-62.5-25 MCG/INH oral inhaler Inhale 1 puff into the lungs At Bedtime       furosemide (LASIX) 40 MG tablet Take 1 tablet (40 mg) by mouth 2 times daily for 30 days 60 tablet 0     gabapentin (NEURONTIN) 600 MG tablet Take 1 tablet (600 mg) by mouth At Bedtime for 30 days 30 tablet 0     HYDROmorphone, STANDARD CONC, (DILAUDID) 1 MG/ML oral solution Take 1 mg by mouth 3 times daily       ipratropium (ATROVENT HFA) 17 MCG/ACT inhaler Inhale 2 puffs into the lungs every 6 hours as needed for wheezing       Melatonin 10 MG TABS tablet Take 10 mg by mouth At  Bedtime       metoprolol succinate ER (TOPROL XL) 50 MG 24 hr tablet Take 1.5 tablets (75 mg) by mouth At Bedtime 135 tablet 1     mirabegron (MYRBETRIQ) 50 MG 24 hr tablet Take 1 tablet (50 mg) by mouth daily 90 tablet 3     multivitamin w/minerals (THERA-VIT-M) tablet Take 1 tablet by mouth daily       nystatin (MYCOSTATIN) 113526 UNIT/GM external cream Apply topically 3 times daily Until clear. 30 g 3     potassium chloride ER (K-TAB/KLOR-CON) 10 MEQ CR tablet Take 1 tablet by mouth daily       rivaroxaban ANTICOAGULANT (XARELTO) 15 MG TABS tablet Take 1 tablet (15 mg) by mouth At Bedtime       simvastatin (ZOCOR) 40 MG tablet Take 1 tablet (40 mg) by mouth At Bedtime 90 tablet 3     solifenacin (VESICARE) 5 MG tablet Take 1 tablet (5 mg) by mouth daily 90 tablet 3     spironolactone (ALDACTONE) 25 MG tablet Take 0.5 tablets (12.5 mg) by mouth At Bedtime for 60 days 15 tablet 1     tamoxifen (NOLVADEX) 20 MG tablet Take 1 tablet (20 mg) by mouth daily 90 tablet 3     traMADol (ULTRAM) 50 MG tablet Take 50 mg by mouth 2 times daily       vitamin D3 (CHOLECALCIFEROL) 50 mcg (2000 units) tablet Take 1 tablet by mouth daily       zinc oxide (DESITIN) 40 % external ointment Apply topically as needed for dry skin or irritation (Twice a day to gluteal area skin irritation) 113 g 1     Allergies   Allergen Reactions     Sulfa (Sulfonamide Antibiotics) [Sulfa Antibiotics] Rash     Headaches and dizziness.     Homeopathic Drugs [External Allergen Needs Reconciliation - See Comment] Unknown     runny nose     Mold Extracts [Molds & Smuts] Unknown     Morphine (Pf) [Morphine] Unknown     hallucinate     Lisinopril Itching, Cough and Unknown     cough     Sulfacetamide Sodium [Sulfacetamide] Rash     Social History     Tobacco Use     Smoking status: Former     Types: Cigarettes     Quit date: 10/28/2007     Years since quitting: 15.7     Smokeless tobacco: Former     Quit date: 9/6/2004   Vaping Use     Vaping Use: Former    Substance Use Topics     Alcohol use: No     Drug use: No             Subjective   Stephanie is a 78 year old, presenting for the following health issues:  Follow Up (Hospital follow up- [facility])        7/12/2023     9:09 AM   Additional Questions   Roomed by Cece FELTON         7/6/2023     1:45 PM   Post Discharge Outreach   Admission Date 7/3/2023   Reason for Admission Leg Swelling   Discharge Date 7/5/2023   Discharge Diagnosis Community-acquired pneumonia  Acute on chronic respiratory failure with hypoxia  History of heart failure with moderately reduced ejection fraction  History of breast cancer  Mid back yeast infection  Elevated creatinine   How are you doing now that you are home? I'm feeling pretty tired but I'm doing okay.  I haven't heard from Home Care yet but I have their number and I will call them tomorrow if I haven't heard from them.   How are your symptoms? (Red Flag symptoms escalate to triage hotline per guidelines) Improved   Do you feel your condition is stable enough to be safe at home until your provider visit? Yes   Does the patient have their discharge instructions?  Yes   Does the patient have questions regarding their discharge instructions?  No   Were you started on any new medications or were there changes to any of your previous medications?  Yes   Does the patient have all of their medications? Yes   Do you have questions regarding any of your medications?  No   Do you have all of your needed medical supplies or equipment (DME)?  (i.e. oxygen tank, CPAP, cane, etc.) Yes   Discharge follow-up appointment scheduled within 14 calendar days?  Yes   Discharge Follow Up Appointment Date 7/12/2023   Discharge Follow Up Appointment Scheduled with? Primary Care Provider     Hospital Follow-up Visit:    Hospital/Nursing Home/IP Rehab Facility: [facility]  Date of Admission: 7/3/2023  Date of Discharge: 7/5/23  Reason(s) for Admission: Pneumonia/ SOB    Was your  "hospitalization related to COVID-19? No   Problems taking medications regularly:  None  Medication changes since discharge: None  Problems adhering to non-medication therapy:  None    Summary of hospitalization:  [facility] hospital discharge summary reviewed  Diagnostic Tests/Treatments reviewed.  Follow up needed: Cardiology and pulmonary  Other Healthcare Providers Involved in Patient s Care:         Homecare  Update since discharge: improved.         Plan of care communicated with patient                 Review of Systems         Objective    /70 (BP Location: Right arm, Patient Position: Sitting)   Pulse (!) 43   Temp 98.1  F (36.7  C)   Resp 18   Ht 1.499 m (4' 11.02\")   Wt 49.9 kg (110 lb)   SpO2 97%   BMI 22.21 kg/m    Body mass index is 22.21 kg/m .  Physical Exam                       "

## 2023-07-12 NOTE — TELEPHONE ENCOUNTER
Lexapro 5 mg a day prescription for depression was sent to [facility] pharmacy.  Patient can start that now.    Patient does have a history of long QT syndrome but she just recently had an EKG on July 3 with a QTc of 489 which is within normal limits.    Make sure the daughter knows that there is a heart arrhythmia risk with the antidepressant medication, but I feel the benefit likely outweighs the risk for her and she can proceed with the Lexapro 5 mg tablet, which is a lower dose tablet.

## 2023-07-12 NOTE — TELEPHONE ENCOUNTER
Daughter Leelee is wondering if you will reconsider an anti-depressant/anti-anxiety for her. She said her PHQ-9 was bad today and she thinks mom would benefit from this. If questions call Leelee [phone]

## 2023-07-12 NOTE — PATIENT INSTRUCTIONS
Do not proceed with angiogram at this time.  Consider repeat heart echo later this summer.    Continue on current medications at this time, although you may be contacted to reduce your furosemide, depending on your kidney labs.    Follow-up with me in 2 weeks for further discussion on your conditions.    Proceed with further lung evaluation as planned this month.    You can use Desitin cream or other diaper rash type product in your buttock area on a regular basis until better.    Consider changing your CODE STATUS to DNR/DNI and requesting a hospice consult.

## 2023-07-13 NOTE — TELEPHONE ENCOUNTER
----- Message from Latrice Lynn sent at 7/12/2023  8:35 AM CDT -----  Regarding: RE: CA P.PCI  Case type: CA Poss PCI  Procedure Physician(s): RUSS/MY  Procedure Date and Patient Arrival Time: Friday 7/21, with arrival time of 0700  H&P: Pt scheduled on 7/12 PMD  Pre-Procedure Lab Appt: Wednesday 7/19 at  - Please place lab orders if you haven't already!  Alerts/Important Info: Renal, COPD/CHF, O2 Dependent, 48 hr Xarelto hold     Thank you,  Latrice    ----- Message -----  From: Leelee Campa RN  Sent: 7/6/2023   1:53 PM CDT  To: [facility] Procedure  Pool - [facility]  Subject: CA P.PCI                                         Case Type: CA P.PCI  Ordering Provider: Dr. Chilel  H&P: Needed  Alerts: Renal, COPD/CHF, Oxygen Dependent, 48 hour Xarelto Hold  Additional Info/Urgency: next available  Orders for Pre-Procedure Labs? 7/6/2023

## 2023-07-13 NOTE — TELEPHONE ENCOUNTER
Attempted to contact, no answer.    LMTCB    Route to nurse line to relay message when patient calls back

## 2023-07-13 NOTE — TELEPHONE ENCOUNTER
Kidney labs show an elevated urea nitrogen and creatinine, patient is getting too dry.  Have her reduce the furosemide down to 40 mg once a day.

## 2023-07-13 NOTE — TELEPHONE ENCOUNTER
Home Care is calling regarding an established patient with [facility].       Requesting orders from: Pankaj Montanez  Provider is following patient: Yes  Is this a 60-day recertification request?  No    Orders Requested    Skilled Nursing  Request for continuation of care with no increase or decrease in frequency  Frequency: 1x/wk for 3 wks  then visit every other week until end of certification period           Verbal orders given.  Home Care will send orders for provider to sign.  Confirmed ok to leave a detailed message with call back.  Contact information confirmed and updated as needed.

## 2023-07-17 NOTE — TELEPHONE ENCOUNTER
"Dr. Chilel,  For your return:  Patient has cancelled her procedure because she \"has too much going on\" with the spot on her lung.  Follow up is scheduled in  7/24/23.  Thank you,  Leelee        Reached out to patient for procedure education and instructions.  Patient states she is cancelling the procedure as she has a spot on her lung that she needs a CT for.   Confirmed with patient she wants to cancel her coronary angiogram.   Assured patient her procedure will be cancelled and an update will be forwarded to Dr. Chilel.  Understanding verbalized.    "

## 2023-07-18 NOTE — TELEPHONE ENCOUNTER
Relayed to patient. She verbalized understanding. She also requested mychart be deactivated since it is too hard for her to use.

## 2023-07-19 NOTE — TELEPHONE ENCOUNTER
Per patient request, patient would like to be discharged from home care visits (PT, OT, social work) at this time. Patient is currently going through a hard time and dealing with a new diagnosis for her  and just does not think it is plausible at this time.

## 2023-08-02 NOTE — PROGRESS NOTES
Irene León   78 year old female    Date of Visit: 8/2/2023    Chief Complaint   Patient presents with    Follow Up     Discuss and review CT from June and July.     Subjective  78-year-old female is here with daughter, Leelee.    Patient has severe COPD oxygen dependent.  She recovered from a left lower lobe pneumonia earlier this month.    Right upper lobe opacity was increased on recent CT scan on July 31.  She has already reviewed that with the pulmonary doctor and has an appointment with oncology on August 8 and radiation oncology on August 15.    Patient has not had any recent change in cough.  Shortness of breath is stable.    She has chronic back pain and is taking the Dilaudid, currently twice a day.  Previous T12 compression fracture.    She has chronic anxiety, is on Lexapro 5 mg a day but requesting increase to 10 mg a day.  She does have a history of long QT syndrome but does not have a history of V. tach.    Does have a nonobstructing coronary disease but has not had an event.  No new chest pain.    She does have worsening of her systolic function on June 2023 echo ejection fraction down to 20-25% with severe diastolic dysfunction.  Previous ejection fraction 2022 was 35-40%.    She has been losing weight, increased creatinine to 1.9 on July 12.  I had her reduce the furosemide from 40 mg twice a day down to once a day.  Still on spironolactone 12.5 mg a day.    No increased lower extremity edema, she still has essentially no edema.  No worsening orthopnea type shortness of breath.    Buttock intertrigo is better she is using topical Desitin.    Chronic atrial fibrillation on Xarelto.  No bleeding complaints.  On Toprol-XL.    Severe kyphosis with suspected sleep apnea.    Patient has gained 2 pounds since last visit.  She is eating boost and [place] instant breakfast.    PMHx:    Past Medical History:   Diagnosis Date    ANATOLIY (acute kidney injury) (H)     Allergic rhinitis     Arrhythmia      Atrial fibrillation with RVR (H)     Bacteremia     Breast cancer (H) 2017    Cardiomyopathy (H)     Centrilobular emphysema (H)     CHF (congestive heart failure) (H)     CKD (chronic kidney disease)     COPD (chronic obstructive pulmonary disease) (H)     Coronary artery disease     Depression     Dysphagia     E. coli sepsis (H)     Factor 5 Leiden mutation, heterozygous (H)     GERD (gastroesophageal reflux disease)     Heart failure with reduced ejection fraction (H) 4/17/2023    Hx of radiation therapy 2017    Hyperlipidemia     Hypertension     Hypokalemia     Hypomagnesemia     Idiopathic cardiomyopathy (H)     Left hip pain 4/30/2014    OAB (overactive bladder)     Osteoporosis     Sacral insufficiency fracture     Sinusitis     Syncope     Urge incontinence     Vocal cord dysfunction      PSHx:    Past Surgical History:   Procedure Laterality Date    ARTHROPLASTY REVISION HIP Left     BIOPSY BREAST Right 2017    BLADDER SURGERY      bladder interstim with removal    CARDIAC CATHETERIZATION      CATARACT EXTRACTION Left 07/18/2017    IR ABDOMINAL AORTOGRAM  4/16/2003    IR AORTIC ARCH 4 VESSEL ANGIOGRAM  4/16/2003    IR MISCELLANEOUS PROCEDURE  4/16/2003    LUMPECTOMY BREAST Left 06/2017    x2    PICC DOUBLE LUMEN PLACEMENT  4/11/2022         PICC TRIPLE LUMEN PLACEMENT  4/7/2022         ZZC OPEN TX FEMORAL FRACTURE DISTAL MED/LAT CONDYLE Left 10/28/2015    Procedure: OPEN REDUCTION INTERNAL FIXATION LEFT  PROXIMAL FEMUR PERIPROSTHETIC FRACTURE;  Surgeon: Yovanny Albarran MD;  Location: [facility];  Service: Orthopedics     Immunizations:   Immunization History   Administered Date(s) Administered    COVID-19 Bivalent 12+ (Pfizer) 11/16/2022    COVID-19 MONOVALENT 12+ (Pfizer) 03/02/2021, 03/23/2021, 12/08/2021    FLU 6-35 months 11/03/2003    Flu 65+ Years 09/20/2017, 09/25/2018, 10/10/2019    Flu, Unspecified 10/01/2016, 10/01/2018    [id number] Novel Flu 01/05/2010    Influenza (H1N1) 01/05/2010     "Influenza (High Dose) 3 valent vaccine 10/30/2014, 10/01/2015, 11/26/2016, 09/25/2018, 10/10/2019, 10/19/2020    Influenza (IIV3) PF 11/03/2003, 11/08/2006, 10/24/2007, 11/28/2008, 10/09/2009, 10/22/2010, 01/26/2012, 10/03/2012, 11/01/2013    Influenza Vaccine 65+ (Fluzone HD) 10/19/2020, 12/15/2020, 11/16/2022    Mantoux Tuberculin Skin Test 12/13/2007, 10/24/2020, 11/03/2020    Pneumo Conj 13-V (2010&after) 05/11/2015    Pneumococcal 23 valent 11/24/1997, 11/21/2007, 05/17/2017    TD,PF 7+ (Tenivac) 07/20/2004    TDAP (Adacel,Boostrix) 02/12/2016    TDAP Vaccine (Boostrix) 02/12/2016    Zoster recombinant adjuvanted (SHINGRIX) 07/31/2020    Zoster vaccine, live 02/18/2009, 10/20/2014       ROS A comprehensive review of systems was performed and was otherwise negative    Medications, allergies, and problem list were reviewed and updated    Exam  BP (!) 164/67   Pulse 71   Temp 98.1  F (36.7  C)   Resp 18   Ht 1.499 m (4' 11\")   Wt 51 kg (112 lb 8 oz)   SpO2 91%   BMI 22.72 kg/m    Frail elderly female.  She did become severely anxious when discussing the CT scan results and likely diagnosis of cancer.  She has severe kyphosis.  Decreased breath sounds throughout.  But there is no bibasilar crackles or new dullness.  There is no ankle edema.  Abdomen is nontender.  Heart is irregularly irregular, rate controlled    Assessment/Plan  1. Chronic combined systolic and diastolic congestive heart failure (H)  Still compensated despite reduced furosemide to once a day.  Patient has had reduced oral intake and likely some dehydration.    Continue to encourage oral diet.    Continue on the lower dose of furosemide daily and low-dose spinal lactone.  Follow-up in September.  She declined blood draw today.    2. Anxiety  Patient did request an increase of Lexapro to 10 mg.  I did discuss cardiac arrhythmia risk with the higher dose of Lexapro.  Patient accepts risk and does feel the benefit outweighs the risk for " her.    Patient is DNR/DNI, with goal of emphasis of comfort.  - escitalopram (LEXAPRO) 10 MG tablet; Take 1 tablet (10 mg) by mouth daily  Dispense: 90 tablet; Refill: 3    I did discuss benzodiazepine such as Ativan for anxiety management, but with risk of respiratory depression.  It could be considered in the future as she transitions to hospice.    3. Mass of upper lobe of right lung  I did review the CT scan of the chest with patient from last month.  New focus of nodule and increasing size of the other nodule consistent with lung neoplasm.    I would not anticipate that patient could tolerate a biopsy or resection with her severe COPD.  She does have pulmonary function test scheduled.    Meets with radiation oncology later this month.  Could consider palliative radiation therapy, but with warning given on risk of causing radiation pneumonitis on her lung causing increasing shortness of breath, possibly severe/terminal.    We did discuss hospice philosophy and options.  She is not ready to request hospice at this time and will meet with oncology this month.  Follow-up with me in early September    4. Chronic bilateral low back pain without sciatica  Chronic musculoskeletal pain.  History of compression fracture.  Currently on Dilaudid with benefit of narcotics.  Emphasis on comfort.  Can increase narcotics as needed for comfort.    Patient denies constipation    5. Chronic obstructive pulmonary disease, unspecified COPD type (H)  Severe, oxygen dependent.  Trelegy Ellipta and as needed inhalers.  Ipratropium inhaler    6. Chronic atrial fibrillation (H)  Rate controlled with metoprolol.  Xarelto.    7. Chronic renal insufficiency, stage 3 (moderate) (H)  Patient declined blood draw today.  Follow-up next month.  Continue on the once a day furosemide and low-dose spironolactone for heart failure    8. Coronary artery disease due to calcified coronary lesion  History of nonobstructive coronary disease.  No new  ischemic symptoms.  Worsening systolic congestive heart failure in June, likely related to her lung condition.  I am recommending she not proceed with angiogram, as risk likely outweighs benefit.    History of breast cancer with radiation in 2017.    Buttock intertrigo, she reports better.  Continue topical barrier treatment.    History of stroke and severe intracranial stenosis.  Continue on simvastatin and blood thinner.    She is not reporting any new sinusitis symptoms.    Time greater than 40 minutes.  Extensive time reviewing probable lung cancer diagnosis and the chest CT scan findings and discussing hospice philosophy.  Chest CT scan reviewed with daughter, she was given a copy of the report.      Return in 5 weeks (on 9/6/2023) for Clinic follow-up.   Patient Instructions   You can increase the Lexapro dose to 10 mg a day.  Increasing the Lexapro dose does increase the risk of heart arrhythmia, but the benefit likely outweighs the risk.    Continue with your current pain medication, and you could consider additional pain medication or additional antianxiety medication in the future.    Meet with the radiation oncologist to discuss possible palliative radiation.    Follow-up with me in September.        Pankaj Montanez MD, MD        Current Outpatient Medications   Medication Sig Dispense Refill    acetaminophen (TYLENOL) 500 MG tablet Take 1,000 mg by mouth every 8 hours as needed for mild pain or fever       albuterol (PROAIR HFA/PROVENTIL HFA/VENTOLIN HFA) 108 (90 Base) MCG/ACT inhaler Inhale 2 puffs into the lungs every 6 hours as needed for shortness of breath, wheezing or cough      benzonatate (TESSALON PERLES) 100 MG capsule Take 1 capsule (100 mg) by mouth 3 times daily as needed for cough 90 capsule 3    CALCIUM-MAGNESIUM-ZINC PO Take 1 tablet by mouth daily      cetirizine (ZYRTEC) 10 MG tablet Take 10 mg by mouth daily as needed for allergies      chlorhexidine (PERIDEX) 0.12 % solution  [CHLORHEXIDINE (PERIDEX) 0.12 % SOLUTION] Apply 15 mL to the mouth or throat at bedtime as needed.       escitalopram (LEXAPRO) 10 MG tablet Take 1 tablet (10 mg) by mouth daily 90 tablet 3    famotidine (PEPCID) 20 MG tablet Take 1 tablet (20 mg) by mouth At Bedtime 90 tablet 3    Fluticasone-Umeclidin-Vilanterol (TRELEGY ELLIPTA) 200-62.5-25 MCG/INH oral inhaler Inhale 1 puff into the lungs At Bedtime      HYDROmorphone, STANDARD CONC, (DILAUDID) 1 MG/ML oral solution Take 1 mg by mouth 3 times daily      ipratropium (ATROVENT HFA) 17 MCG/ACT inhaler Inhale 2 puffs into the lungs every 6 hours as needed for wheezing      Melatonin 10 MG TABS tablet Take 10 mg by mouth At Bedtime      metoprolol succinate ER (TOPROL XL) 50 MG 24 hr tablet Take 1.5 tablets (75 mg) by mouth At Bedtime 135 tablet 1    mirabegron (MYRBETRIQ) 50 MG 24 hr tablet Take 1 tablet (50 mg) by mouth daily 90 tablet 3    multivitamin w/minerals (THERA-VIT-M) tablet Take 1 tablet by mouth daily      nystatin (MYCOSTATIN) 394548 UNIT/GM external cream Apply topically 3 times daily Until clear. 30 g 3    potassium chloride ER (K-TAB/KLOR-CON) 10 MEQ CR tablet Take 1 tablet by mouth daily      rivaroxaban ANTICOAGULANT (XARELTO) 15 MG TABS tablet Take 1 tablet (15 mg) by mouth At Bedtime      simvastatin (ZOCOR) 40 MG tablet Take 1 tablet (40 mg) by mouth At Bedtime 90 tablet 3    solifenacin (VESICARE) 5 MG tablet Take 1 tablet (5 mg) by mouth daily 90 tablet 3    spironolactone (ALDACTONE) 25 MG tablet Take 0.5 tablets (12.5 mg) by mouth At Bedtime for 60 days 15 tablet 1    tamoxifen (NOLVADEX) 20 MG tablet Take 1 tablet (20 mg) by mouth daily 90 tablet 3    traMADol (ULTRAM) 50 MG tablet Take 50 mg by mouth 2 times daily      vitamin D3 (CHOLECALCIFEROL) 50 mcg (2000 units) tablet Take 1 tablet by mouth daily      zinc oxide (DESITIN) 40 % external ointment Apply topically as needed for dry skin or irritation (Twice a day to gluteal area skin  irritation) 113 g 1    furosemide (LASIX) 40 MG tablet Take 1 tablet (40 mg) by mouth 2 times daily for 30 days 60 tablet 0    gabapentin (NEURONTIN) 600 MG tablet Take 1 tablet (600 mg) by mouth At Bedtime for 30 days 30 tablet 0     Allergies   Allergen Reactions    Sulfa (Sulfonamide Antibiotics) [Sulfa Antibiotics] Rash     Headaches and dizziness.    Homeopathic Drugs [External Allergen Needs Reconciliation - See Comment] Unknown     runny nose    Mold Extracts [Molds & Smuts] Unknown    Morphine (Pf) [Morphine] Unknown     hallucinate    Lisinopril Itching, Cough and Unknown     cough    Sulfacetamide Sodium [Sulfacetamide] Rash     Social History     Tobacco Use    Smoking status: Former     Types: Cigarettes     Quit date: 10/28/2007     Years since quitting: 15.7    Smokeless tobacco: Former     Quit date: 9/6/2004   Vaping Use    Vaping Use: Former   Substance Use Topics    Alcohol use: No    Drug use: No               Subjective   Stephanie is a 78 year old, presenting for the following health issues:  No chief complaint on file.      History of Present Illness       Reason for visit:  Followup    She eats 2-3 servings of fruits and vegetables daily.She consumes 0 sweetened beverage(s) daily.She exercises with enough effort to increase her heart rate 9 or less minutes per day.  She exercises with enough effort to increase her heart rate 7 days per week. She is missing 1 dose(s) of medications per week.               Review of Systems         Objective    There were no vitals taken for this visit.  There is no height or weight on file to calculate BMI.  Physical Exam

## 2023-08-02 NOTE — PATIENT INSTRUCTIONS
You can increase the Lexapro dose to 10 mg a day.  Increasing the Lexapro dose does increase the risk of heart arrhythmia, but the benefit likely outweighs the risk.    Continue with your current pain medication, and you could consider additional pain medication or additional antianxiety medication in the future.    Meet with the radiation oncologist to discuss possible palliative radiation.    Follow-up with me in September.

## 2023-08-08 NOTE — PROGRESS NOTES
[facility] Hematology and Oncology Progress Note    Patient: Irene León  MRN: [id number]  Date of Service: Aug 8, 2023       Reason for Visit    Chief Complaint   Patient presents with    Oncology Clinic Visit     Invasive ductal carcinoma of breast. Female, left.        Assessment and Plan     Cancer Staging   Invasive ductal carcinoma of breast, female, left (H)  Staging form: Breast, AJCC 8th Edition  - Pathologic stage from 6/20/2017: Stage IA (pT1b, pN0(sn), cM0, G1, ER+, AR+, HER2-, Oncotype DX score: 6) - Signed by Devon Suarez MD on 7/4/2022      ECOG Performance    3 - Confined to bed/chair > 50% of time, capable of limited self care     Pain  Pain Score: No Pain (0)      #.  An enlarging right upper lobe pulmonary nodule of 2.1 cm and additional nodule of 1.3 cm with mediastinal lymphadenopathy  #.  History of invasive ductal carcinoma of the left breast.   Stage IA (pT1b, pN0 (sn),cM0). grade 1, associated DCIS, ER/AR positive, HER-2 negative, s/p left breast lumpectomy and left axillary sentinel lymph node biopsy. Right breast atypical ductal hyperplasia s/p right breast excisional biopsy on 6/20/2017.  She has several comorbidities including nonischemic cardiomyopathy (EF 40% in 3/17), osteoporosis on treatment with oral bisphosphonate, factor V Leiden mutation (details unknown). Started Tamoxifen in 12/2017.  #.  Chronic hypoxic respiratory failure, on supplemental oxygen     Reviewed the CT scan results from 7/31/2023 and it showed enlarging right upper lobe lung mass of nearly doubled in size in about a month interval.  There is also noted to have additional nodule of 1.3 cm which appeared to be stable from prior exam.  The remaining smaller pulmonary nodules of 0.8 cm are unchanged.  She also has prominent mediastinal lymphadenopathy and they are stable from prior exam a month ago.   Discussed that these findings are definitely concerning for malignancy.  This finding is concerning  for lung cancer or metastatic from breast cancer.  Her lung condition does not allow to biopsy of pulmonary nodule.  She wishes to continue further work-up and explore treatment option.    Plan:  -Recommended to complete PET scan.    8/11/2023  I called and spoke with the patient and her daughter over the phone regarding the PET scan results.  It showed FDG avid right upper lobe pulmonary lung neoplasm with adjacent satellite nodule and thoracic lymph node metastasis most consistent with locally advanced lung cancer within metastatic breast cancer.  There is no evidence of distant metastasis.  Unfortunately, there is no other alternative site for biopsy then lung or mediastinal lymph node which is not able to be completed due to her advanced emphysema and chronic hypoxic respiratory failure.  I discussed the potential treatment option for locally advanced lung cancer with chemoradiation.  Radiation is absolutely contraindicated with her pulmonary condition.  Her current performance status is not adequate for chemotherapy either.  She has strong desire to look into potential treatment option with the goal of palliation including prolonging life expectancy.    I recommended to complete liquid biopsy to explore treatment options.  If there is targetable mutation, she will be a candidate for treatment of the cancer.  If there is no targetable mutation, I believe continue supportive symptom focused care for lung cancer.    Follow-up with me about 2 weeks after completion of [id number] liquid biopsy.    Okay to stop tamoxifen since she completed endocrine therapy.  I also provided with her that continuing tamoxifen will not have any impact on her cancer.  There is no particular diet which changed the disease outcome either.    Encounter Diagnoses:    Problem List Items Addressed This Visit    None  Visit Diagnoses       Malignant neoplasm of upper lobe of right lung (H)    -  Primary    Relevant Orders    [id number]  Tumor Sequencing Assay    Mass of upper lobe of right lung        Relevant Orders    CBC with Platelets & Differential (Completed)    Comprehensive metabolic panel (Completed)                 CC: Pnakaj Montanez MD   ______________________________________________________________________________  Diagnosis  6/20/17  Stage IA (pT1b, pN0(sn), cM0) invasive ductal carcinoma of the left breast  - ER (high positive, 98%), IL (high positive, 97%) HER2 (negative, score of 1+ by IHC)  - Leena grade 1  - Tumor size: <1 cm  - Margin-negative on final margin with reexcision for invasive carcinoma but close margin for DCIS of 0.2 cm from new medial margin.    - 1 sentinel lymph node removed, negative for carcinoma  - associated DCIS  - Right breast atypical ductal hyperplasia.    - Oncotype DX recurrence score 6 : 10 year risk of recurrence with 5 year of tamoxifen is 5%.    Therapy to date:  6/20/17 -right breast excisional biopsy AND left breast lumpectomy, left axillary sentinel lymph node biopsy  6/30/17-reexcision lumpectomy of the left breast  9/8/17-completed adjuvant radiation to the left breast 4256 cGy/16  12/6/17-initiated adjuvant endocrine therapy with tamoxifen.    Comorbidities  #.  Mild hypoxia and chronic cough.   She is closely follow with pulmonologist at [facility].    #. Hypertension, controlled.  #.  Osteoporosis  #.  COPD  #.  Idiopathic cardiomyopathy- LVEF 40% in October 2018, no change from prior.      History of Present Illness    Ms. Irene León presented today accompanied by her daughter for evaluation of right upper lung mass.  She was very good about lung mass being suspected for cancer.  She does not have any changes from her baseline pulmonary status.  She came in a wheelchair.  She is not in acute distress however.  She continues to take tamoxifen.    Review of systems  Apart from describing in HPI, the remainder of comprehensive ROS was negative.    Past History    Past Medical History:  "  Diagnosis Date    ANATOLIY (acute kidney injury) (H)     Allergic rhinitis     Arrhythmia     Atrial fibrillation with RVR (H)     Bacteremia     Breast cancer (H) 2017    Cardiomyopathy (H)     Centrilobular emphysema (H)     CHF (congestive heart failure) (H)     CKD (chronic kidney disease)     COPD (chronic obstructive pulmonary disease) (H)     Coronary artery disease     Depression     Dysphagia     E. coli sepsis (H)     Factor 5 Leiden mutation, heterozygous (H)     GERD (gastroesophageal reflux disease)     Heart failure with reduced ejection fraction (H) 4/17/2023    Hx of radiation therapy 2017    Hyperlipidemia     Hypertension     Hypokalemia     Hypomagnesemia     Idiopathic cardiomyopathy (H)     Left hip pain 4/30/2014    OAB (overactive bladder)     Osteoporosis     Sacral insufficiency fracture     Sinusitis     Syncope     Urge incontinence     Vocal cord dysfunction        Past Surgical History:   Procedure Laterality Date    ARTHROPLASTY REVISION HIP Left     BIOPSY BREAST Right 2017    BLADDER SURGERY      bladder interstim with removal    CARDIAC CATHETERIZATION      CATARACT EXTRACTION Left 07/18/2017    IR ABDOMINAL AORTOGRAM  4/16/2003    IR AORTIC ARCH 4 VESSEL ANGIOGRAM  4/16/2003    IR MISCELLANEOUS PROCEDURE  4/16/2003    LUMPECTOMY BREAST Left 06/2017    x2    PICC DOUBLE LUMEN PLACEMENT  4/11/2022         PICC TRIPLE LUMEN PLACEMENT  4/7/2022         ZZC OPEN TX FEMORAL FRACTURE DISTAL MED/LAT CONDYLE Left 10/28/2015    Procedure: OPEN REDUCTION INTERNAL FIXATION LEFT  PROXIMAL FEMUR PERIPROSTHETIC FRACTURE;  Surgeon: Yovanny Albarran MD;  Location: [facility];  Service: Orthopedics       Physical Exam    /58 (Patient Position: Sitting)   Pulse 76   Temp 98  F (36.7  C) (Oral)   Resp 16   Ht 1.499 m (4' 11\")   Wt 51.7 kg (114 lb)   SpO2 91%   BMI 23.03 kg/m      General: alert, awake, not in acute distress.  Very thin and cachectic.    She wears supplemental " oxygen.  Lungs are clear to auscultation bilaterally with diminished.  CNS: non focal.      Lab Results    Recent Results (from the past 168 hour(s))   Comprehensive metabolic panel   Result Value Ref Range    Sodium 140 136 - 145 mmol/L    Potassium 3.7 3.5 - 5.0 mmol/L    Chloride 104 98 - 107 mmol/L    Carbon Dioxide (CO2) 28 22 - 31 mmol/L    Anion Gap 8 5 - 18 mmol/L    Urea Nitrogen 21 8 - 28 mg/dL    Creatinine 2.03 (H) 0.60 - 1.10 mg/dL    Calcium 8.6 8.5 - 10.5 mg/dL    Glucose 99 70 - 125 mg/dL    Alkaline Phosphatase 59 45 - 120 U/L    AST 21 0 - 40 U/L    ALT <9 0 - 45 U/L    Protein Total 6.8 6.0 - 8.0 g/dL    Albumin 2.9 (L) 3.5 - 5.0 g/dL    Bilirubin Total 0.1 0.0 - 1.0 mg/dL    GFR Estimate 25 (L) >60 mL/min/1.73m2   CBC with platelets and differential   Result Value Ref Range    WBC Count 13.8 (H) 4.0 - 11.0 10e3/uL    RBC Count 3.71 (L) 3.80 - 5.20 10e6/uL    Hemoglobin 10.9 (L) 11.7 - 15.7 g/dL    Hematocrit 34.7 (L) 35.0 - 47.0 %    MCV 94 78 - 100 fL    MCH 29.4 26.5 - 33.0 pg    MCHC 31.4 (L) 31.5 - 36.5 g/dL    RDW 15.1 (H) 10.0 - 15.0 %    Platelet Count 243 150 - 450 10e3/uL    % Neutrophils 79 %    % Lymphocytes 9 %    % Monocytes 9 %    % Eosinophils 1 %    % Basophils 1 %    % Immature Granulocytes 1 %    NRBCs per 100 WBC 0 <1 /100    Absolute Neutrophils 11.1 (H) 1.6 - 8.3 10e3/uL    Absolute Lymphocytes 1.2 0.8 - 5.3 10e3/uL    Absolute Monocytes 1.2 0.0 - 1.3 10e3/uL    Absolute Eosinophils 0.2 0.0 - 0.7 10e3/uL    Absolute Basophils 0.1 0.0 - 0.2 10e3/uL    Absolute Immature Granulocytes 0.1 <=0.4 10e3/uL    Absolute NRBCs 0.0 10e3/uL   Glucose by meter   Result Value Ref Range    GLUCOSE BY METER POCT 97 70 - 99 mg/dL         Imaging    PET Oncology Whole Body    Result Date: 8/11/2023  EXAM: PET ONCOLOGY WHOLE BODY, CT CHEST ABDOMEN PELVIS W/O CONTRAST LOCATION: [facility] DATE: 8/11/2023 INDICATION: Other nonspecific abnormal finding of lung field.  COMPARISON: CT chest 07/31/2023. CONTRAST: None. TECHNIQUE: Serum glucose level 97 mg/dL. One hour post intravenous administration of 10.16 mCi F-18 FDG, PET imaging was performed from the skull vertex to feet, utilizing attenuation correction with concurrent axial CT and PET/CT image fusion. Separate diagnostic CT of the chest, abdomen, and pelvis was performed. Dose reduction techniques were used. PET/CT FINDINGS: FDG avid 2.3 x 2.1 cm pulmonary nodule in the peripheral right upper lobe abutting the lateral pleura (SUV max 6.2) with adjacent satellite nodule just superior to it measuring 1.2 x 0.6 cm (SUV max 5.5), suspicious for primary lung neoplasm. There are FDG avid left station 10 L/7 (SUV max 8.7), station 7 (SUV max 3.9), right lower paratracheal station 4R (SUV max 4.3), right upper paratracheal station 2R (SUV max 4.6), and prevascular station 3A (SUV max 4.4) lymph nodes, suspicious for metastases. There are additional mildly FDG avid patchy opacities throughout the bilateral lung fields and nodularity along the right minor fissure (SUV max 1.6), which remain indeterminate and could be inflammatory or an additional sites of early neoplasm. FDG avid  reticular change in the periphery of the lungs, typical of interstitial lung disease/inflammation. CT FINDINGS: Moderate senescent intracranial changes. Mucosal thickening in the paranasal sinuses. Moderate carotid artery bifurcation calcification. Upper lobe predominant emphysema. Moderate coronary artery calcification. Scattered atherosclerotic calcifications. Pelvic phleboliths. Scattered colonic diverticuli. Left hip arthroplasty. Chronic compression deformity of T12. Multilevel degenerative changes of the spine.     IMPRESSION: 1. Findings suspicious for right upper lobe primary lung neoplasm with adjacent satellite nodule and thoracic lymph node metastases. 2. Additional scattered mildly FDG avid patchy opacities in bilateral lung fields and nodularity  along the right major fissure, which are indeterminant and warrants continued imaging surveillance.    CT Chest Abdomen Pelvis w/o Contrast    Result Date: 8/11/2023  EXAM: PET ONCOLOGY WHOLE BODY, CT CHEST ABDOMEN PELVIS W/O CONTRAST LOCATION: [facility] DATE: 8/11/2023 INDICATION: Other nonspecific abnormal finding of lung field. COMPARISON: CT chest 07/31/2023. CONTRAST: None. TECHNIQUE: Serum glucose level 97 mg/dL. One hour post intravenous administration of 10.16 mCi F-18 FDG, PET imaging was performed from the skull vertex to feet, utilizing attenuation correction with concurrent axial CT and PET/CT image fusion. Separate diagnostic CT of the chest, abdomen, and pelvis was performed. Dose reduction techniques were used. PET/CT FINDINGS: FDG avid 2.3 x 2.1 cm pulmonary nodule in the peripheral right upper lobe abutting the lateral pleura (SUV max 6.2) with adjacent satellite nodule just superior to it measuring 1.2 x 0.6 cm (SUV max 5.5), suspicious for primary lung neoplasm. There are FDG avid left station 10 L/7 (SUV max 8.7), station 7 (SUV max 3.9), right lower paratracheal station 4R (SUV max 4.3), right upper paratracheal station 2R (SUV max 4.6), and prevascular station 3A (SUV max 4.4) lymph nodes, suspicious for metastases. There are additional mildly FDG avid patchy opacities throughout the bilateral lung fields and nodularity along the right minor fissure (SUV max 1.6), which remain indeterminate and could be inflammatory or an additional sites of early neoplasm. FDG avid  reticular change in the periphery of the lungs, typical of interstitial lung disease/inflammation. CT FINDINGS: Moderate senescent intracranial changes. Mucosal thickening in the paranasal sinuses. Moderate carotid artery bifurcation calcification. Upper lobe predominant emphysema. Moderate coronary artery calcification. Scattered atherosclerotic calcifications. Pelvic phleboliths. Scattered colonic  diverticuli. Left hip arthroplasty. Chronic compression deformity of T12. Multilevel degenerative changes of the spine.     IMPRESSION: 1. Findings suspicious for right upper lobe primary lung neoplasm with adjacent satellite nodule and thoracic lymph node metastases. 2. Additional scattered mildly FDG avid patchy opacities in bilateral lung fields and nodularity along the right major fissure, which are indeterminant and warrants continued imaging surveillance.    CT CHEST WO    Result Date: 7/31/2023  For Patients: As a result of the 21st Century Cures Act, medical imaging exams and procedure reports are released immediately into your electronic medical record. You may view this report before your referring provider. If you have questions, please contact your health care provider. EXAM: CT CHEST WO LOCATION: [facility] IMAGING DATE: 7/31/2023 INDICATION: Lung Nodule COMPARISON: 04/10/2023 TECHNIQUE: CT chest without IV contrast. Multiplanar reformats were obtained. Dose reduction techniques were used. CONTRAST: None. FINDINGS: LUNGS AND PLEURA: Severe emphysema. Previously seen right upper lobe 1 cm nodule has increased in size and now measures 2.1 cm. Superior to this, there is an additional nodule measuring 1.3 cm. There are patchy bibasilar opacities. Somewhat irregular appearing nodules are seen along the right minor fissure measuring up to 0.9 cm on images 187 and 195. Similar scarring and bronchiectasis in the left upper lobe. MEDIASTINUM/AXILLAE: Prominent mediastinal lymph nodes are again seen, such as a 1 cm precarinal lymph node on image 60 as well as similar prominent upper paratracheal lymph nodes that to 0.8 cm lymph node on image 34. CORONARY ARTERY CALCIFICATION: Severe. UPPER ABDOMEN: Infrarenal abdominal aortic aneurysm measuring 3 cm. Renal cysts which do not need follow-up. MUSCULOSKELETAL: Bilateral rib fractures. T12 compression fracture is again seen.    1.  Findings most compatible with  malignancy with interval increased size of the previously seen right upper lobe nodule now measuring 2.1 cm with a new adjacent nodule measuring 1.3 cm. Additional somewhat irregular appearing nodules are seen along the right minor fissure measuring up to 0.9 cm. PET/CT is recommended. 2.  Prominent mediastinal lymph nodes. 3.  Infrarenal abdominal aortic aneurysm measuring 3 cm.     50 minutes spent on the date of the encounter doing chart review, history and exam, documentation, communication of the treatment plan with the care team and further activities as noted above.    Signed by: Devon Suarez MD

## 2023-08-08 NOTE — PROGRESS NOTES
"Oncology Rooming Note    August 8, 2023 2:02 PM   Irene León is a 78 year old female who presents for:    Chief Complaint   Patient presents with    Oncology Clinic Visit     Invasive ductal carcinoma of breast. Female, left.      Initial Vitals: /58 (Patient Position: Sitting)   Pulse 76   Temp 98  F (36.7  C) (Oral)   Resp 16   Ht 1.499 m (4' 11\")   Wt 51.7 kg (114 lb)   SpO2 91%   BMI 23.03 kg/m   Estimated body mass index is 23.03 kg/m  as calculated from the following:    Height as of this encounter: 1.499 m (4' 11\").    Weight as of this encounter: 51.7 kg (114 lb). Body surface area is 1.47 meters squared.  No Pain (0) Comment: Data Unavailable   No LMP recorded. Patient is postmenopausal.  Allergies reviewed: Yes  Medications reviewed: Yes    Medications: Medication refills not needed today.  Pharmacy name entered into [facility]:    [id number] - [place], [place] - [facility] [facility]  AT [facility]  [facility] - [place], [place] - [address] [place] [address] - [address] [facility]    Clinical concerns: No concerns today.       Raghu Griffin LPN            "

## 2023-08-08 NOTE — LETTER
August 14, 2023      Stephanie León  [address] [address]        Dear ,    We are writing to inform you of your test results.    Your creatinine level, kidney function test is worsening.  Likely you are getting too dehydrated.  Make sure you are eating and drinking regularly.  Hemoglobin level is mildly lower.    Recheck labs on September 6 as planned.    Resulted Orders   Comprehensive metabolic panel   Result Value Ref Range    Sodium 140 136 - 145 mmol/L    Potassium 3.7 3.5 - 5.0 mmol/L    Chloride 104 98 - 107 mmol/L    Carbon Dioxide (CO2) 28 22 - 31 mmol/L    Anion Gap 8 5 - 18 mmol/L    Urea Nitrogen 21 8 - 28 mg/dL    Creatinine 2.03 (H) 0.60 - 1.10 mg/dL    Calcium 8.6 8.5 - 10.5 mg/dL    Glucose 99 70 - 125 mg/dL    Alkaline Phosphatase 59 45 - 120 U/L    AST 21 0 - 40 U/L    ALT <9 0 - 45 U/L    Protein Total 6.8 6.0 - 8.0 g/dL    Albumin 2.9 (L) 3.5 - 5.0 g/dL    Bilirubin Total 0.1 0.0 - 1.0 mg/dL    GFR Estimate 25 (L) >60 mL/min/1.73m2   CBC with platelets and differential   Result Value Ref Range    WBC Count 13.8 (H) 4.0 - 11.0 10e3/uL      Comment:      Preliminary ANC is 11.1    RBC Count 3.71 (L) 3.80 - 5.20 10e6/uL    Hemoglobin 10.9 (L) 11.7 - 15.7 g/dL    Hematocrit 34.7 (L) 35.0 - 47.0 %    MCV 94 78 - 100 fL    MCH 29.4 26.5 - 33.0 pg    MCHC 31.4 (L) 31.5 - 36.5 g/dL    RDW 15.1 (H) 10.0 - 15.0 %    Platelet Count 243 150 - 450 10e3/uL    % Neutrophils 79 %    % Lymphocytes 9 %    % Monocytes 9 %    % Eosinophils 1 %    % Basophils 1 %    % Immature Granulocytes 1 %    NRBCs per 100 WBC 0 <1 /100    Absolute Neutrophils 11.1 (H) 1.6 - 8.3 10e3/uL    Absolute Lymphocytes 1.2 0.8 - 5.3 10e3/uL    Absolute Monocytes 1.2 0.0 - 1.3 10e3/uL    Absolute Eosinophils 0.2 0.0 - 0.7 10e3/uL    Absolute Basophils 0.1 0.0 - 0.2 10e3/uL    Absolute Immature Granulocytes 0.1 <=0.4 10e3/uL    Absolute NRBCs 0.0 10e3/uL       If you have any questions or concerns, please call the  clinic at the number listed above.       Sincerely,      Devon Suarez MD

## 2023-08-08 NOTE — LETTER
"    8/8/2023         RE: Irene León  [address] [facility] [address]        Dear Colleague,    Thank you for referring your patient, Irene León, to the [facility]. Please see a copy of my visit note below.    Oncology Rooming Note    August 8, 2023 2:02 PM   Irene León is a 78 year old female who presents for:    Chief Complaint   Patient presents with     Oncology Clinic Visit     Invasive ductal carcinoma of breast. Female, left.      Initial Vitals: /58 (Patient Position: Sitting)   Pulse 76   Temp 98  F (36.7  C) (Oral)   Resp 16   Ht 1.499 m (4' 11\")   Wt 51.7 kg (114 lb)   SpO2 91%   BMI 23.03 kg/m   Estimated body mass index is 23.03 kg/m  as calculated from the following:    Height as of this encounter: 1.499 m (4' 11\").    Weight as of this encounter: 51.7 kg (114 lb). Body surface area is 1.47 meters squared.  No Pain (0) Comment: Data Unavailable   No LMP recorded. Patient is postmenopausal.  Allergies reviewed: Yes  Medications reviewed: Yes    Medications: Medication refills not needed today.  Pharmacy name entered into "[employer]:    [id number] [place] [id number] [facility] [place] [facility]  [facility] [address] [facility] [address] [facility]    Clinical concerns: No concerns today.       Raghu Griffin LPN              [facility] Hematology and Oncology Progress Note    Patient: Irene León  MRN: [id number]  Date of Service: Aug 8, 2023       Reason for Visit    Chief Complaint   Patient presents with     Oncology Clinic Visit     Invasive ductal carcinoma of breast. Female, left.        Assessment and Plan     Cancer Staging   Invasive ductal carcinoma of breast, female, left (H)  Staging form: Breast, AJCC 8th Edition  - Pathologic stage from 6/20/2017: Stage IA (pT1b, pN0(sn), cM0, G1, ER+, NH+, HER2-, " Oncotype DX score: 6) - Signed by Devon Suarez MD on 7/4/2022      ECOG Performance    3 - Confined to bed/chair > 50% of time, capable of limited self care     Pain  Pain Score: No Pain (0)      #.  An enlarging right upper lobe pulmonary nodule of 2.1 cm and additional nodule of 1.3 cm with mediastinal lymphadenopathy  #.  History of invasive ductal carcinoma of the left breast.   Stage IA (pT1b, pN0 (sn),cM0). grade 1, associated DCIS, ER/VT positive, HER-2 negative, s/p left breast lumpectomy and left axillary sentinel lymph node biopsy. Right breast atypical ductal hyperplasia s/p right breast excisional biopsy on 6/20/2017.  She has several comorbidities including nonischemic cardiomyopathy (EF 40% in 3/17), osteoporosis on treatment with oral bisphosphonate, factor V Leiden mutation (details unknown). Started Tamoxifen in 12/2017.  #.  Chronic hypoxic respiratory failure, on supplemental oxygen     Reviewed the CT scan results from 7/31/2023 and it showed enlarging right upper lobe lung mass of nearly doubled in size in about a month interval.  There is also noted to have additional nodule of 1.3 cm which appeared to be stable from prior exam.  The remaining smaller pulmonary nodules of 0.8 cm are unchanged.  She also has prominent mediastinal lymphadenopathy and they are stable from prior exam a month ago.   Discussed that these findings are definitely concerning for malignancy.  This finding is concerning for lung cancer or metastatic from breast cancer.  Her lung condition does not allow to biopsy of pulmonary nodule.  She wishes to continue further work-up and explore treatment option.    Plan:  -Recommended to complete PET scan.    8/11/2023  I called and spoke with the patient and her daughter over the phone regarding the PET scan results.  It showed FDG avid right upper lobe pulmonary lung neoplasm with adjacent satellite nodule and thoracic lymph node metastasis most consistent with locally  advanced lung cancer within metastatic breast cancer.  There is no evidence of distant metastasis.  Unfortunately, there is no other alternative site for biopsy then lung or mediastinal lymph node which is not able to be completed due to her advanced emphysema and chronic hypoxic respiratory failure.  I discussed the potential treatment option for locally advanced lung cancer with chemoradiation.  Radiation is absolutely contraindicated with her pulmonary condition.  Her current performance status is not adequate for chemotherapy either.  She has strong desire to look into potential treatment option with the goal of palliation including prolonging life expectancy.    I recommended to complete liquid biopsy to explore treatment options.  If there is targetable mutation, she will be a candidate for treatment of the cancer.  If there is no targetable mutation, I believe continue supportive symptom focused care for lung cancer.    Follow-up with me about 2 weeks after completion of [id number] liquid biopsy.    Okay to stop tamoxifen since she completed endocrine therapy.  I also provided with her that continuing tamoxifen will not have any impact on her cancer.  There is no particular diet which changed the disease outcome either.    Encounter Diagnoses:    Problem List Items Addressed This Visit    None  Visit Diagnoses       Malignant neoplasm of upper lobe of right lung (H)    -  Primary    Relevant Orders    [id number] Tumor Sequencing Assay    Mass of upper lobe of right lung        Relevant Orders    CBC with Platelets & Differential (Completed)    Comprehensive metabolic panel (Completed)                 CC: Pankaj Montanez MD   ______________________________________________________________________________  Diagnosis  6/20/17  Stage IA (pT1b, pN0(sn), cM0) invasive ductal carcinoma of the left breast  - ER (high positive, 98%), WI (high positive, 97%) HER2 (negative, score of 1+ by IHC)  - Leena grade 1  -  Tumor size: <1 cm  - Margin-negative on final margin with reexcision for invasive carcinoma but close margin for DCIS of 0.2 cm from new medial margin.    - 1 sentinel lymph node removed, negative for carcinoma  - associated DCIS  - Right breast atypical ductal hyperplasia.    - Oncotype DX recurrence score 6 : 10 year risk of recurrence with 5 year of tamoxifen is 5%.    Therapy to date:  6/20/17 -right breast excisional biopsy AND left breast lumpectomy, left axillary sentinel lymph node biopsy  6/30/17-reexcision lumpectomy of the left breast  9/8/17-completed adjuvant radiation to the left breast 4256 cGy/16 12/6/17-initiated adjuvant endocrine therapy with tamoxifen.    Comorbidities  #.  Mild hypoxia and chronic cough.   She is closely follow with pulmonologist at [facility].    #. Hypertension, controlled.  #.  Osteoporosis  #.  COPD  #.  Idiopathic cardiomyopathy- LVEF 40% in October 2018, no change from prior.      History of Present Illness    Ms. Irene León presented today accompanied by her daughter for evaluation of right upper lung mass.  She was very good about lung mass being suspected for cancer.  She does not have any changes from her baseline pulmonary status.  She came in a wheelchair.  She is not in acute distress however.  She continues to take tamoxifen.    Review of systems  Apart from describing in HPI, the remainder of comprehensive ROS was negative.    Past History    Past Medical History:   Diagnosis Date     ANATOLIY (acute kidney injury) (H)      Allergic rhinitis      Arrhythmia      Atrial fibrillation with RVR (H)      Bacteremia      Breast cancer (H) 2017     Cardiomyopathy (H)      Centrilobular emphysema (H)      CHF (congestive heart failure) (H)      CKD (chronic kidney disease)      COPD (chronic obstructive pulmonary disease) (H)      Coronary artery disease      Depression      Dysphagia      E. coli sepsis (H)      Factor 5 Leiden mutation, heterozygous (H)      GERD  "(gastroesophageal reflux disease)      Heart failure with reduced ejection fraction (H) 4/17/2023     Hx of radiation therapy 2017     Hyperlipidemia      Hypertension      Hypokalemia      Hypomagnesemia      Idiopathic cardiomyopathy (H)      Left hip pain 4/30/2014     OAB (overactive bladder)      Osteoporosis      Sacral insufficiency fracture      Sinusitis      Syncope      Urge incontinence      Vocal cord dysfunction        Past Surgical History:   Procedure Laterality Date     ARTHROPLASTY REVISION HIP Left      BIOPSY BREAST Right 2017     BLADDER SURGERY      bladder interstim with removal     CARDIAC CATHETERIZATION       CATARACT EXTRACTION Left 07/18/2017     IR ABDOMINAL AORTOGRAM  4/16/2003     IR AORTIC ARCH 4 VESSEL ANGIOGRAM  4/16/2003     IR MISCELLANEOUS PROCEDURE  4/16/2003     LUMPECTOMY BREAST Left 06/2017    x2     PICC DOUBLE LUMEN PLACEMENT  4/11/2022          PICC TRIPLE LUMEN PLACEMENT  4/7/2022          ZZC OPEN TX FEMORAL FRACTURE DISTAL MED/LAT CONDYLE Left 10/28/2015    Procedure: OPEN REDUCTION INTERNAL FIXATION LEFT  PROXIMAL FEMUR PERIPROSTHETIC FRACTURE;  Surgeon: Yovanny Albarran MD;  Location: [facility];  Service: Orthopedics       Physical Exam    /58 (Patient Position: Sitting)   Pulse 76   Temp 98  F (36.7  C) (Oral)   Resp 16   Ht 1.499 m (4' 11\")   Wt 51.7 kg (114 lb)   SpO2 91%   BMI 23.03 kg/m      General: alert, awake, not in acute distress.  Very thin and cachectic.    She wears supplemental oxygen.  Lungs are clear to auscultation bilaterally with diminished.  CNS: non focal.      Lab Results    Recent Results (from the past 168 hour(s))   Comprehensive metabolic panel   Result Value Ref Range    Sodium 140 136 - 145 mmol/L    Potassium 3.7 3.5 - 5.0 mmol/L    Chloride 104 98 - 107 mmol/L    Carbon Dioxide (CO2) 28 22 - 31 mmol/L    Anion Gap 8 5 - 18 mmol/L    Urea Nitrogen 21 8 - 28 mg/dL    Creatinine 2.03 (H) 0.60 - 1.10 mg/dL    Calcium " 8.6 8.5 - 10.5 mg/dL    Glucose 99 70 - 125 mg/dL    Alkaline Phosphatase 59 45 - 120 U/L    AST 21 0 - 40 U/L    ALT <9 0 - 45 U/L    Protein Total 6.8 6.0 - 8.0 g/dL    Albumin 2.9 (L) 3.5 - 5.0 g/dL    Bilirubin Total 0.1 0.0 - 1.0 mg/dL    GFR Estimate 25 (L) >60 mL/min/1.73m2   CBC with platelets and differential   Result Value Ref Range    WBC Count 13.8 (H) 4.0 - 11.0 10e3/uL    RBC Count 3.71 (L) 3.80 - 5.20 10e6/uL    Hemoglobin 10.9 (L) 11.7 - 15.7 g/dL    Hematocrit 34.7 (L) 35.0 - 47.0 %    MCV 94 78 - 100 fL    MCH 29.4 26.5 - 33.0 pg    MCHC 31.4 (L) 31.5 - 36.5 g/dL    RDW 15.1 (H) 10.0 - 15.0 %    Platelet Count 243 150 - 450 10e3/uL    % Neutrophils 79 %    % Lymphocytes 9 %    % Monocytes 9 %    % Eosinophils 1 %    % Basophils 1 %    % Immature Granulocytes 1 %    NRBCs per 100 WBC 0 <1 /100    Absolute Neutrophils 11.1 (H) 1.6 - 8.3 10e3/uL    Absolute Lymphocytes 1.2 0.8 - 5.3 10e3/uL    Absolute Monocytes 1.2 0.0 - 1.3 10e3/uL    Absolute Eosinophils 0.2 0.0 - 0.7 10e3/uL    Absolute Basophils 0.1 0.0 - 0.2 10e3/uL    Absolute Immature Granulocytes 0.1 <=0.4 10e3/uL    Absolute NRBCs 0.0 10e3/uL   Glucose by meter   Result Value Ref Range    GLUCOSE BY METER POCT 97 70 - 99 mg/dL         Imaging    PET Oncology Whole Body    Result Date: 8/11/2023  EXAM: PET ONCOLOGY WHOLE BODY, CT CHEST ABDOMEN PELVIS W/O CONTRAST LOCATION: [facility] DATE: 8/11/2023 INDICATION: Other nonspecific abnormal finding of lung field. COMPARISON: CT chest 07/31/2023. CONTRAST: None. TECHNIQUE: Serum glucose level 97 mg/dL. One hour post intravenous administration of 10.16 mCi F-18 FDG, PET imaging was performed from the skull vertex to feet, utilizing attenuation correction with concurrent axial CT and PET/CT image fusion. Separate diagnostic CT of the chest, abdomen, and pelvis was performed. Dose reduction techniques were used. PET/CT FINDINGS: FDG avid 2.3 x 2.1 cm pulmonary nodule in the  peripheral right upper lobe abutting the lateral pleura (SUV max 6.2) with adjacent satellite nodule just superior to it measuring 1.2 x 0.6 cm (SUV max 5.5), suspicious for primary lung neoplasm. There are FDG avid left station 10 L/7 (SUV max 8.7), station 7 (SUV max 3.9), right lower paratracheal station 4R (SUV max 4.3), right upper paratracheal station 2R (SUV max 4.6), and prevascular station 3A (SUV max 4.4) lymph nodes, suspicious for metastases. There are additional mildly FDG avid patchy opacities throughout the bilateral lung fields and nodularity along the right minor fissure (SUV max 1.6), which remain indeterminate and could be inflammatory or an additional sites of early neoplasm. FDG avid  reticular change in the periphery of the lungs, typical of interstitial lung disease/inflammation. CT FINDINGS: Moderate senescent intracranial changes. Mucosal thickening in the paranasal sinuses. Moderate carotid artery bifurcation calcification. Upper lobe predominant emphysema. Moderate coronary artery calcification. Scattered atherosclerotic calcifications. Pelvic phleboliths. Scattered colonic diverticuli. Left hip arthroplasty. Chronic compression deformity of T12. Multilevel degenerative changes of the spine.     IMPRESSION: 1. Findings suspicious for right upper lobe primary lung neoplasm with adjacent satellite nodule and thoracic lymph node metastases. 2. Additional scattered mildly FDG avid patchy opacities in bilateral lung fields and nodularity along the right major fissure, which are indeterminant and warrants continued imaging surveillance.    CT Chest Abdomen Pelvis w/o Contrast    Result Date: 8/11/2023  EXAM: PET ONCOLOGY WHOLE BODY, CT CHEST ABDOMEN PELVIS W/O CONTRAST LOCATION: [facility] DATE: 8/11/2023 INDICATION: Other nonspecific abnormal finding of lung field. COMPARISON: CT chest 07/31/2023. CONTRAST: None. TECHNIQUE: Serum glucose level 97 mg/dL. One hour post  intravenous administration of 10.16 mCi F-18 FDG, PET imaging was performed from the skull vertex to feet, utilizing attenuation correction with concurrent axial CT and PET/CT image fusion. Separate diagnostic CT of the chest, abdomen, and pelvis was performed. Dose reduction techniques were used. PET/CT FINDINGS: FDG avid 2.3 x 2.1 cm pulmonary nodule in the peripheral right upper lobe abutting the lateral pleura (SUV max 6.2) with adjacent satellite nodule just superior to it measuring 1.2 x 0.6 cm (SUV max 5.5), suspicious for primary lung neoplasm. There are FDG avid left station 10 L/7 (SUV max 8.7), station 7 (SUV max 3.9), right lower paratracheal station 4R (SUV max 4.3), right upper paratracheal station 2R (SUV max 4.6), and prevascular station 3A (SUV max 4.4) lymph nodes, suspicious for metastases. There are additional mildly FDG avid patchy opacities throughout the bilateral lung fields and nodularity along the right minor fissure (SUV max 1.6), which remain indeterminate and could be inflammatory or an additional sites of early neoplasm. FDG avid  reticular change in the periphery of the lungs, typical of interstitial lung disease/inflammation. CT FINDINGS: Moderate senescent intracranial changes. Mucosal thickening in the paranasal sinuses. Moderate carotid artery bifurcation calcification. Upper lobe predominant emphysema. Moderate coronary artery calcification. Scattered atherosclerotic calcifications. Pelvic phleboliths. Scattered colonic diverticuli. Left hip arthroplasty. Chronic compression deformity of T12. Multilevel degenerative changes of the spine.     IMPRESSION: 1. Findings suspicious for right upper lobe primary lung neoplasm with adjacent satellite nodule and thoracic lymph node metastases. 2. Additional scattered mildly FDG avid patchy opacities in bilateral lung fields and nodularity along the right major fissure, which are indeterminant and warrants continued imaging  surveillance.    CT CHEST WO    Result Date: 7/31/2023  For Patients: As a result of the 21st Century Cures Act, medical imaging exams and procedure reports are released immediately into your electronic medical record. You may view this report before your referring provider. If you have questions, please contact your health care provider. EXAM: CT CHEST WO LOCATION: [facility] IMAGING DATE: 7/31/2023 INDICATION: Lung Nodule COMPARISON: 04/10/2023 TECHNIQUE: CT chest without IV contrast. Multiplanar reformats were obtained. Dose reduction techniques were used. CONTRAST: None. FINDINGS: LUNGS AND PLEURA: Severe emphysema. Previously seen right upper lobe 1 cm nodule has increased in size and now measures 2.1 cm. Superior to this, there is an additional nodule measuring 1.3 cm. There are patchy bibasilar opacities. Somewhat irregular appearing nodules are seen along the right minor fissure measuring up to 0.9 cm on images 187 and 195. Similar scarring and bronchiectasis in the left upper lobe. MEDIASTINUM/AXILLAE: Prominent mediastinal lymph nodes are again seen, such as a 1 cm precarinal lymph node on image 60 as well as similar prominent upper paratracheal lymph nodes that to 0.8 cm lymph node on image 34. CORONARY ARTERY CALCIFICATION: Severe. UPPER ABDOMEN: Infrarenal abdominal aortic aneurysm measuring 3 cm. Renal cysts which do not need follow-up. MUSCULOSKELETAL: Bilateral rib fractures. T12 compression fracture is again seen.    1.  Findings most compatible with malignancy with interval increased size of the previously seen right upper lobe nodule now measuring 2.1 cm with a new adjacent nodule measuring 1.3 cm. Additional somewhat irregular appearing nodules are seen along the right minor fissure measuring up to 0.9 cm. PET/CT is recommended. 2.  Prominent mediastinal lymph nodes. 3.  Infrarenal abdominal aortic aneurysm measuring 3 cm.     50 minutes spent on the date of the encounter doing chart review,  history and exam, documentation, communication of the treatment plan with the care team and further activities as noted above.    Signed by: Devon Suarez MD           Again, thank you for allowing me to participate in the care of your patient.        Sincerely,        Devon Saurez MD

## 2023-08-11 NOTE — PROGRESS NOTES
[facility]: Cancer Care                                                                                          Patient's daughter, Leelee Conway, called and left message on Cancer Care Triage line stating was told today since PET scan so early, could have results today.  She is calling for results. Call back number [phone].     Reviewed PET scan results with Dr. Suarez.  Patient has mass in R lung. Looks to be lung cancer. She would like to further discuss with Radiation collegues and will call back next week with plan.    Consent to communicate on file for Leelee (4-17-23).  Called Leelee back and results given. She was disappointed to hear results and not sure how patient will take the news. She said she lives 6 minutes from patient and wondering if could call patient with results and she would like to be there with her.  Told her would call her in ~ 10 minutes.    Talked with Dr. Suarez.  Patient sent page through Triage Nurse for results. She called patient with results and plan. Leelee on phone with patient.      Plan is to do [employer] testing and then follow-up with Dr. Suarez 10 days after for results.     Message to Schedulers and Tracy Alonzo CMA. [employer] paperwork signed by Dr. Suarez and placed on Avril's desk.    Signature:  Erin Nogueira RN

## 2023-08-15 NOTE — PATIENT INSTRUCTIONS
It was good to see you today, Stephanie.  I'm sorry about your cancer diagnosis.    Here are the things we talked about:  Try the olanzapine at bedtime; I set a prescription for this today.  If you develop palpitations stop it and let me know you have.  Stop the melatonin     Please get an EKG in a week to make sure the olanzapine isn't affecting the flow of electricity through your heart    Someone from the team will reach out to schedule a follow up appointment in 4 weeks and I can see you sooner.  They will also set you up to visit with one of the counselors who work with me.       How to get a hold of us:  For non-urgent matters, MyChart works best.    For more urgent matters, or if you prefer not to use MyChart, call our clinic nurse coordinator Anabela Hillman RN at [phone]    We have an on-call number for evenings and weekends. Please call this only if you are having uncontrolled symptoms or serious side effects from your medicines: [phone].     For refills, please give us a week (5 working days) notice. We don't always have providers available everyday to do refills. If you call the day you run out of your medicine, we may not be able to refill it in time, so call 5 days in advance!    Tobi Miguel MD MS FAAFP CAQHPM  [facility]  Office [phone]  Fax [phone]

## 2023-08-15 NOTE — NURSING NOTE
Is the patient currently in the state of [place]? YES    Visit mode:VIDEO    If the visit is dropped, the patient can be reconnected by: VIDEO VISIT: Text to cell phone: [phone]    Will anyone else be joining the visit? YES: How would they like to receive their invitation?       How would you like to obtain your AVS? Mail a copy    Are changes needed to the allergy or medication list? NO    Patient declined individual allergy and medication review by support staff because pt states nothing has changed since last reviewed on August 11th 2023.     Reason for visit: RECHECK    Vanessa Rizo VF

## 2023-08-15 NOTE — PROGRESS NOTES
Virtual Visit Details    Type of service:  Video Visit   Video Start Time: 2:05 PM  Video End Time: 1440    Originating Location (pt. Location): Home    Distant Location (provider location):  On-site  Platform used for Video Visit: [facility]    Palliative Care Outpatient Clinic Progress Note    Patient Name: Irene León  Primary Provider: Pankaj Montanez    Impressions, Recommendations & Counseling      SYMPTOM ASSESSMENT:  1.  Shortness of breath secondary to advanced COPD, previous lung abscess with no antibiotics for past couple of months and no further infectious disease follow up needed and pulmonology follow up is scheduled for May  Significant activity intolerance as becomes short of breath with minimal exertion in the home; can still walk to the bathroom and back but no further;  -Oxygen 2.5-3 L nasal cannula (slightly less than she's needed previously)  -Continue with nebulizers, inhalers, and Mucinex   -Continue following pcp and pulmonology.  -Continue to exercise by walking around the house and walking the steps up and down and using therabands and light weights;      2.  Generalized weakness and fatigue secondary to advanced COPD and activity intolerance.  -Completed strengthening at TCU and was discharged 5/10/2022 and PT with [facility].  -Continue home exercise program with therabands and light weights     3.  Chronic pain syndrome with chronic back pain from T12 compression fracture AND AGGRAVATION WITH FALL INTO A RECLINER 10/11/2022 WITH SUBSEQUENT PYRIFORMIS SYNDROME. She feels she is back to her baseline T12 area pain at this time and her back pain is 'bad;'  she's wondering about a spinal cord stimulator and I encouraged her to discuss this with her PCP; her sister is having one placed this week and Stephanie isn't sure she wants to proceed.   -5/18/203 stop Butrans  Start Hydromorphone 1 mg po TID-being used mostly BID  We discussed OIC and use of MOM or fiber supplements to help with  that  - Gabapentin at 600/000/600 and she is cautioned due to renal function  - Stephanie feels Tramadol 50 mg by mouth twice daily is not helpful and would like to try something else.    Plan is for her to start using Tramadol 25 mg by mouth twice a day and then a week later drop one of the daily doses and then a week later stop it all together;   -she has not seen PT for a year; she doesn't feel ready to pursue more PT as she worries it will aggravate her pain;    - Continue Salonpas and Voltaren as needed.  -Briefly discussed role of medical cannabis as a means to assist pain control and stimulate appetite. Stephanie was open to registering for the state program and that process was completed.     4. Anorexia and weight loss (40+ pound weight loss since 3/2021) - now at 112#  (was 111# in June and weighed 110# in March and 105# in January and 98# was her all time low )  - Continue to monitor.   - encouraged Boost twice daily and carnation breakfast;  -Declined nutrition consult.  -will see how medical cannabis helps here.     5. Acute perineal dermatitis post antibiotics and diarrheal episode  -Do a sitz bath with warm water and 1 tsp per quart of water after BMs and up to three times a day  -Pat dry or sit on a towel to gently dry your bottom  -Apply the nystatin cream three times a day until you feel better and then for one more day.  -If no improvement or worsening on Friday go to urgent care    6. Locally advanced lung cancer  recently diagnosed and Stephanie is at high risk from a biopsy and   is waiting on results of a Guardant 360 to see if there are targetable mutations and thus opportunities to try some systemic treatments.  Otherwise she will recommend supportive care.  Stephanie is hoping she may qualify for Keytruda (a niece has survived lung cancer for 5 years on it).  We reviewed what treatment options might look like and why it is often the case that patients with advanced lung cancer and co-morbidities  live longer with supportive care than while systemic treatments.  Stephanie understands this and is still sad about her diagnosis and likely prognosis.    7.  Insomnia Stephanie is up to 15 mg melatonin without good sleep results.  She would like to try something else.  We discussed doxepin and trazodone and olanzapine. None of them are ideal due to her QTc being 489.  A benzo or hypnotic would also be problematic.  After a thorough review of risks and benefits we decided since she has anxiety as well to try olanzapine 5 mg at hs and check an ECG in a week to see what her QTc is.  She understands to stop the olanzapine if she feels palpitations.     ADVANCED CARE PLANNING/GOALS OF CARE DISCUSSION  -CODE STATUS: DNR/DNI.    - Goals are otherwise life-prolonging.  - POLST document completed 4/13/2022.  -Follow-up with outpatient palliative care in the next 1 months for ongoing goals of care discussion and symptom management and support.    8/15/2023   Stephanie is hoping for more time with her new presumptive cancer diagnosis.  She understands that treatment options are very limited due to her underlying health status and comorbidities.  She hopes she will have a targetable mutation.  She also understands that for many persons with advanced lung cancer no treatment may actually help them live longer.  She did not want to talk about options for care if there are no targetable lesions on the liquid biopsy. She affirmed she is DNAR/DNI.  She wants options explored for possible treatments before deciding that none are feasible.  I assured her that if her goal is 'more time' then no treatment might be the  best way for her to achieve that goal at this point in her life.    Advance Care Planning Discussion 8/15/2023. I, Tobi Miguel MD met with Patient and their spouse today at their residence to discuss Advance Care Planning. Irene CARY Mau does have decisional capacity and was present for this discussion.  Those present were  informed of the voluntary nature of this discussion and wished to proceed.  The discussion included:  see paragraph immediately above this one . This discussion began at 1410 and ended at 1430 for a total of 20 minutes.         Counseling: All of the above was explained to the patient in lay language. The patient has verbalized a clear understanding of the discussion, asked appropriate questions, which have been answered to patient's apparent satisfaction. The patient is in agreement with the above plan.        Chief Complaint/Patient ID: Irene León 78 year old female with PMHx of severe COPD; pyriformis injury, and remote history of breast cancer s/p lumpectomy and radiation therapy.  She has an irritated area on her perineum 2/2 to diarrhea which is slowing down.    Last Palliative care appointment: 6/27/2023 with me     Reviewed: yes, no concerns    Interim History:  Irene León is a 78 year old female who is seen today for follow up with Palliative Care via billable video visit. Her  was I the background but did not participate in the visit.  Stephanie was diagnosed with lung cancer recently and is too fragile to undergo a biopsy attempt.  Guardant 360 is pending.  She is hoping there is a targetable mutation as Dr. Suarez doesn't recommend systemic treatment due to frailty. This has been a blow to Stephanie and she doesn't feel ready to die.       Pain:  good control with scheduled hydromorphone (more for dyspnea) and tramadol prn; she did not complain about her pyriformis pain    Appetite/Nausea: fair     Bowels: no concerns     Sleep: difficulty initiating sleep; see impressions above     Mood: depressed and anxious as she awaits Guardant 360 results     Coping:  reasonably well; she did request  a visit with a palliative  and one was requested today    Family History- Reviewed in Epic.    Allergies   Allergen Reactions    Sulfa (Sulfonamide Antibiotics) [Sulfa Antibiotics] Rash      Headaches and dizziness.    Homeopathic Drugs [External Allergen Needs Reconciliation - See Comment] Unknown     runny nose    Mold Extracts [Molds & Smuts] Unknown    Morphine (Pf) [Morphine] Unknown     hallucinate    Lisinopril Itching, Cough and Unknown     cough    Sulfacetamide Sodium [Sulfacetamide] Rash       Social History:  Pertinent changes to social history/social situation since last visit: none  Key support resources:  and children  Advance Directive Status:  documents in Epic, DNAR/DNI    Social History     Tobacco Use    Smoking status: Former     Types: Cigarettes     Quit date: 10/28/2007     Years since quitting: 15.8    Smokeless tobacco: Former     Quit date: 9/6/2004   Vaping Use    Vaping Use: Former   Substance Use Topics    Alcohol use: No    Drug use: No         Allergies   Allergen Reactions    Sulfa (Sulfonamide Antibiotics) [Sulfa Antibiotics] Rash     Headaches and dizziness.    Homeopathic Drugs [External Allergen Needs Reconciliation - See Comment] Unknown     runny nose    Mold Extracts [Molds & Smuts] Unknown    Morphine (Pf) [Morphine] Unknown     hallucinate    Lisinopril Itching, Cough and Unknown     cough    Sulfacetamide Sodium [Sulfacetamide] Rash     Current Outpatient Medications   Medication Sig Dispense Refill    acetaminophen (TYLENOL) 500 MG tablet Take 1,000 mg by mouth every 8 hours as needed for mild pain or fever       albuterol (PROAIR HFA/PROVENTIL HFA/VENTOLIN HFA) 108 (90 Base) MCG/ACT inhaler Inhale 2 puffs into the lungs every 6 hours as needed for shortness of breath, wheezing or cough      benzonatate (TESSALON PERLES) 100 MG capsule Take 1 capsule (100 mg) by mouth 3 times daily as needed for cough 90 capsule 3    CALCIUM-MAGNESIUM-ZINC PO Take 1 tablet by mouth daily      cetirizine (ZYRTEC) 10 MG tablet Take 10 mg by mouth daily as needed for allergies      chlorhexidine (PERIDEX) 0.12 % solution [CHLORHEXIDINE (PERIDEX) 0.12 % SOLUTION] Apply 15  mL to the mouth or throat at bedtime as needed.       escitalopram (LEXAPRO) 10 MG tablet Take 1 tablet (10 mg) by mouth daily 90 tablet 3    famotidine (PEPCID) 20 MG tablet Take 1 tablet (20 mg) by mouth At Bedtime 90 tablet 3    Fluticasone-Umeclidin-Vilanterol (TRELEGY ELLIPTA) 200-62.5-25 MCG/INH oral inhaler Inhale 1 puff into the lungs At Bedtime      furosemide (LASIX) 40 MG tablet Take 1 tablet (40 mg) by mouth 2 times daily for 30 days 60 tablet 0    gabapentin (NEURONTIN) 600 MG tablet Take 1 tablet (600 mg) by mouth At Bedtime for 30 days 30 tablet 0    HYDROmorphone, STANDARD CONC, (DILAUDID) 1 MG/ML oral solution Take 1 mg by mouth 3 times daily      ipratropium (ATROVENT HFA) 17 MCG/ACT inhaler Inhale 2 puffs into the lungs every 6 hours as needed for wheezing      Melatonin 10 MG TABS tablet Take 10 mg by mouth At Bedtime      metoprolol succinate ER (TOPROL XL) 50 MG 24 hr tablet Take 1.5 tablets (75 mg) by mouth At Bedtime 135 tablet 1    mirabegron (MYRBETRIQ) 50 MG 24 hr tablet Take 1 tablet (50 mg) by mouth daily 90 tablet 3    multivitamin w/minerals (THERA-VIT-M) tablet Take 1 tablet by mouth daily      nystatin (MYCOSTATIN) 326468 UNIT/GM external cream Apply topically 3 times daily Until clear. 30 g 3    potassium chloride ER (K-TAB/KLOR-CON) 10 MEQ CR tablet Take 1 tablet by mouth daily      rivaroxaban ANTICOAGULANT (XARELTO) 15 MG TABS tablet Take 1 tablet (15 mg) by mouth At Bedtime      simvastatin (ZOCOR) 40 MG tablet Take 1 tablet (40 mg) by mouth At Bedtime 90 tablet 3    solifenacin (VESICARE) 5 MG tablet Take 1 tablet (5 mg) by mouth daily 90 tablet 3    spironolactone (ALDACTONE) 25 MG tablet Take 0.5 tablets (12.5 mg) by mouth At Bedtime for 60 days 15 tablet 1    tamoxifen (NOLVADEX) 20 MG tablet Take 1 tablet (20 mg) by mouth daily 90 tablet 3    traMADol (ULTRAM) 50 MG tablet Take 50 mg by mouth 2 times daily      vitamin D3 (CHOLECALCIFEROL) 50 mcg (2000 units) tablet Take 1  tablet by mouth daily      zinc oxide (DESITIN) 40 % external ointment Apply topically as needed for dry skin or irritation (Twice a day to gluteal area skin irritation) 113 g 1     Past Medical History:   Diagnosis Date    ANATOLIY (acute kidney injury) (H)     Allergic rhinitis     Arrhythmia     Atrial fibrillation with RVR (H)     Bacteremia     Breast cancer (H) 2017    Cardiomyopathy (H)     Centrilobular emphysema (H)     CHF (congestive heart failure) (H)     CKD (chronic kidney disease)     COPD (chronic obstructive pulmonary disease) (H)     Coronary artery disease     Depression     Dysphagia     E. coli sepsis (H)     Factor 5 Leiden mutation, heterozygous (H)     GERD (gastroesophageal reflux disease)     Heart failure with reduced ejection fraction (H) 4/17/2023    Hx of radiation therapy 2017    Hyperlipidemia     Hypertension     Hypokalemia     Hypomagnesemia     Idiopathic cardiomyopathy (H)     Left hip pain 4/30/2014    OAB (overactive bladder)     Osteoporosis     Sacral insufficiency fracture     Sinusitis     Syncope     Urge incontinence     Vocal cord dysfunction      Past Surgical History:   Procedure Laterality Date    ARTHROPLASTY REVISION HIP Left     BIOPSY BREAST Right 2017    BLADDER SURGERY      bladder interstim with removal    CARDIAC CATHETERIZATION      CATARACT EXTRACTION Left 07/18/2017    IR ABDOMINAL AORTOGRAM  4/16/2003    IR AORTIC ARCH 4 VESSEL ANGIOGRAM  4/16/2003    IR MISCELLANEOUS PROCEDURE  4/16/2003    LUMPECTOMY BREAST Left 06/2017    x2    PICC DOUBLE LUMEN PLACEMENT  4/11/2022         PICC TRIPLE LUMEN PLACEMENT  4/7/2022         ZZC OPEN TX FEMORAL FRACTURE DISTAL MED/LAT CONDYLE Left 10/28/2015    Procedure: OPEN REDUCTION INTERNAL FIXATION LEFT  PROXIMAL FEMUR PERIPROSTHETIC FRACTURE;  Surgeon: Yovanny Albarran MD;  Location: [facility];  Service: Orthopedics       Physical Exam:   GENERAL APPEARANCE: frail, alert and no distress; neatly groomed  EYES: Eyes  grossly normal to inspection, PERRLA, conjunctivae and sclerae without injection or discharge, EOM intact   RESP:  no increased work of breathing; speaks in complete sentences;   MS: No musculoskeletal defects are noted  SKIN: No suspicious lesions or rashes, hydration status appears adequate with normal skin turgor   PSYCH: Alert and oriented x3; speech- coherent, normal rate and volume; able to articulate logical thoughts, able to abstract reason, no tangential thoughts, no hallucinations or delusions, mentation appears normal, Mood is euthymic. Affect is appropriate for this mood state and subdued. Thought content is free of suicidal ideation, hallucinations, and delusions.  Eye contact is good during conversation.       Key Data Reviewed:  LABS: 2023- Cr 2.03, Albumin 2.9,  Hgb 10.9,      IMAGIN2023 CT CAP W/O CONTRAST  IMPRESSION:     1. Findings suspicious for right upper lobe primary lung neoplasm with adjacent satellite nodule and thoracic lymph node metastases.     2. Additional scattered mildly FDG avid patchy opacities in bilateral lung fields and nodularity along the right major fissure, which are indeterminant and warrants continued imaging surveillance.    EKG 7/3/2023 QTc 489    52  minutes spent on the date of the encounter doing chart review, history and exam, patient education & counseling, documentation and other activities as noted above. 20 of the 52 minutes were spent in ACP discussion in  light of her new diagnosis of advanced lung cancer.    Tobi Miguel MD MS FAAFP CAQHPM  [facility]  Office [phone]  Fax [phone]

## 2023-08-22 NOTE — TELEPHONE ENCOUNTER
Called Guardant 360 back. Left message on secure voicemail with patient's legal name and  birth date.  Explained patient has hx of breast cancer and potential new diagnosis of lung CA but unable to do lung biopsy which is why this testing being done. Instructed to call back if they need additional information or further explanation.    Lilian returned my call and left message stating needs diagnosis code and what type of lung CA patient has.  She said if type now known, can run general lung CA panel.  Call back number:  [phone].  Ext [phone].    Called Lilian back with diagnosis code: malignant neoplasm of upper lobe of right lung C34.11 and to run the general lung cancer panel.  She verbalized understanding and will process this information.    Will monitor for results.    Erin Nogueira RN

## 2023-08-22 NOTE — TELEPHONE ENCOUNTER
Lilian from [facility] laboratory calls with further questions regarding patient's lab work that was sent in. They are needing to know patient's official first name along with her specific cancer type. Lung was mentioned in the documentation but then the ICD 10 code was for breast cancer. Lastly the 2nd tube of blood was sent for additional quality checks so there will be a delay in results.    Please call back to verify information needed.    Bianca Coates RN

## 2023-08-23 NOTE — TELEPHONE ENCOUNTER
Received telephone call from patient requesting refill of tramadol.     Last refill: 5/18/23  Last office visit: 8/15/23  Scheduled for follow up 9/18/23     Will route request to MD for review.     Reviewed [place]  Report.

## 2023-08-27 NOTE — ED NOTES
RT performed ET/oral suctioning and will place patient on humidified oxygen to help support airway and thin secretions.

## 2023-08-27 NOTE — ED NOTES
PT got out of her bed without staff assistance. PT urinated on self and took off her oxygen mask. Oxygen saturation dropped due to removal of O2 mask. PT redirected and placed back on oxygen.

## 2023-08-27 NOTE — PHARMACY-ADMISSION MEDICATION HISTORY
"    Pharmacist Admission Medication History    Admission medication history is complete. The information provided in this note is only as accurate as the sources available at the time of the update.    Medication reconciliation/reorder completed by provider prior to medication history? No    Information Source(s): Patient and CareEverywhere/SureScripts via in-person    Pertinent Information:     Spoke with daughter, Leelee, who helps patient with medications. She wasn't sure on some of the inhalers or as needed medications, stating that \"she has a lot of bottles and inhalers around, I'm not sure what they are\". She believes her mom doesn't take lasix every day.     Changes made to PTA medication list:  Added: Acetylcysteine nebs, NaCl nebs, flonase, azelastine  Deleted: Potassium  Changed: None    Medication Affordability:       Allergies reviewed with patient and updates made in EHR: yes    Medications available for use during hospital stay: NONE.     Medication History Completed By: Brittany Rao [facility] 8/27/2023 8:58 AM    PTA Med List   Medication Sig Last Dose    acetaminophen (TYLENOL) 500 MG tablet Take 1,000 mg by mouth every 8 hours as needed for mild pain or fever  Unknown    albuterol (PROAIR HFA/PROVENTIL HFA/VENTOLIN HFA) 108 (90 Base) MCG/ACT inhaler Inhale 2 puffs into the lungs every 6 hours as needed for shortness of breath, wheezing or cough Unknown    benzonatate (TESSALON PERLES) 100 MG capsule Take 1 capsule (100 mg) by mouth 3 times daily as needed for cough Unknown    CALCIUM-MAGNESIUM-ZINC PO Take 1 tablet by mouth daily Past Week    cetirizine (ZYRTEC) 10 MG tablet Take 10 mg by mouth daily as needed for allergies Unknown    chlorhexidine (PERIDEX) 0.12 % solution [CHLORHEXIDINE (PERIDEX) 0.12 % SOLUTION] Apply 15 mL to the mouth or throat at bedtime as needed.  Unknown    escitalopram (LEXAPRO) 10 MG tablet Take 1 tablet (10 mg) by mouth daily 8/26/2023 at am    " Fluticasone-Umeclidin-Vilanterol (TRELEGY ELLIPTA) 200-62.5-25 MCG/INH oral inhaler Inhale 1 puff into the lungs At Bedtime 8/26/2023    furosemide (LASIX) 40 MG tablet Take 40 mg by mouth 2 times daily Past Week    gabapentin (NEURONTIN) 600 MG tablet Take 600 mg by mouth At Bedtime 8/26/2023 at pm    HYDROmorphone, STANDARD CONC, (DILAUDID) 1 MG/ML oral solution Take 1 mg by mouth 3 times daily as needed for pain Past Week    ipratropium (ATROVENT HFA) 17 MCG/ACT inhaler Inhale 2 puffs into the lungs every 6 hours as needed for wheezing Unknown    Melatonin 5 MG TBDP Take 15 mg by mouth At Bedtime 8/26/2023 at hs    metoprolol succinate ER (TOPROL XL) 50 MG 24 hr tablet Take 1.5 tablets (75 mg) by mouth At Bedtime 8/26/2023    mirabegron (MYRBETRIQ) 50 MG 24 hr tablet Take 1 tablet (50 mg) by mouth daily 8/26/2023    multivitamin w/minerals (THERA-VIT-M) tablet Take 1 tablet by mouth daily 8/26/2023    nystatin (MYCOSTATIN) 182110 UNIT/GM external cream Apply topically 3 times daily as needed for dry skin Unknown    OLANZapine (ZYPREXA) 5 MG tablet Take 1 tablet (5 mg) by mouth At Bedtime 8/26/2023    rivaroxaban ANTICOAGULANT (XARELTO) 15 MG TABS tablet Take 1 tablet (15 mg) by mouth At Bedtime 8/26/2023    simvastatin (ZOCOR) 40 MG tablet Take 1 tablet (40 mg) by mouth At Bedtime 8/26/2023    solifenacin (VESICARE) 5 MG tablet Take 1 tablet (5 mg) by mouth daily 8/26/2023    spironolactone (ALDACTONE) 25 MG tablet Take 12.5 mg by mouth daily Past Week    tamoxifen (NOLVADEX) 20 MG tablet Take 1 tablet (20 mg) by mouth daily 8/26/2023

## 2023-08-27 NOTE — ED NOTES
With movement and changing pt, pts 02 sats dropped and pt started gasping for air. Pt sat her legs over the edge of the bed and oxymask placed back on pt and oxygen was slowly turned back up. Pt on 9 L and was 93%.  Pt coached on slowing down her breathing. MD notified

## 2023-08-27 NOTE — H&P
"[facility] MEDICINE ADMISSION HISTORY AND PHYSICAL       Assessment & Plan      1. Acute SOB and hypoxia - Multifactorial     - With BNP elevated -- Acute/Chronic CHF exacerbation. Most recent EF is 20-25% as of June 2023. This is worsening (from prior 40%).     - Has advanced COPD - could be in exacerbation, was given solumedrol, refused BiPAP or CPAP, now at 3L via FM, and asleep - comfortable, from sedation given by ED  - Has lung cancer - not treated yet  - Chest XR - no definite PNA  - On xarelto per records for prior PE       - BiPAP, steroid, nebs, was given lasix 40 mgs in ED - she will resume her home lasix   - CBC/VBG/CMP in AM  - cardiology consult - for severe CHF   - may need to HOLD metoprolol for COPD/CHF issues     2. Poorly controlled HTN. Her SBP is  200s -- now down to 117  - PRN hydralazine, nitropaste    3. Chronic LBP related to prior compression fractures. Chronic pain syndrome with chronic back pain   - PTA meds    4. She is on tamoxifen for history of breast CA     5. She has CKD stage V  - check UA     6. Lung cancer - per recent records --- high risk from a biopsy and  from [place]  is waiting on results of a Guardant 360 to see if there are targetable mutations and thus opportunities to try some systemic treatments.      7. Insomnia - PTA meds     8. EKG showed prolonged Qtc - avoid Qtc prolonging drugs       630A - She landed on the floor, and able to talk and answer a little - she said, she feels better, less SOB, no chest pain, some coughing could be bronchitis, she said, she had angiogram done.    Despite POLST - she wants to be FULL code - \"I have grand kids to take care of\".     VTE prophylaxis --  on xarelto   Diet --   NPO  Code Status -- Full   Barriers to discharge -- Admitting medical condition/s  Discharge Disposition and goals --  Unable to determine at this point, pending clinical progress and response to treatment. Patient may need transfer to " SNF or ACR if unsafe to go home and needed treatment inappropriate at home setting OR may need home health care evaluation if care can be delivered at home settings. Consider referral to care manager/    PPE - I was wearing PPE when I met the patient including but not limited to - N95 mask, Gloves, and/or Safety glasses.  Admission Status -- Inpatient     75 minutes spent by me on the date of service doing chart review, history, exam, diagnostic test results interpretation, documentation & further activities per the note.        Sung Junior MD, MPH, FACP, [facility]  Internal Medicine - Hospitalist        Chief Complaint SOB      HISTORY     - Patient is in ED - 1. She is here for SOB. She has history of severe COPD. 80% on 3L O2.   - Last admission - July 2023 at [facility] - for CHF and PNA  - She was sedated when I met her, she was given ativan  - She looked comfortable, HR 90s, O2 sat at 94% on 3-4L of O2 vai FM     - ED course - Chest XR was done. BNP over 400. Neg trop.  Initial BP in the 200s, and now down to 117    - ROS --- Unobtainable given MS       Past Medical History     Past Medical History:   Diagnosis Date    ANATOLIY (acute kidney injury) (H)     Allergic rhinitis     Arrhythmia     Atrial fibrillation with RVR (H)     Bacteremia     Breast cancer (H) 2017    Cardiomyopathy (H)     Centrilobular emphysema (H)     CHF (congestive heart failure) (H)     CKD (chronic kidney disease)     COPD (chronic obstructive pulmonary disease) (H)     Coronary artery disease     Depression     Dysphagia     E. coli sepsis (H)     Factor 5 Leiden mutation, heterozygous (H)     GERD (gastroesophageal reflux disease)     Heart failure with reduced ejection fraction (H) 4/17/2023    Hx of radiation therapy 2017    Hyperlipidemia     Hypertension     Hypokalemia     Hypomagnesemia     Idiopathic cardiomyopathy (H)     Left hip pain 4/30/2014    OAB (overactive bladder)     Osteoporosis     Sacral insufficiency fracture      Sinusitis     Syncope     Urge incontinence     Vocal cord dysfunction          Surgical History     Past Surgical History:   Procedure Laterality Date    ARTHROPLASTY REVISION HIP Left     BIOPSY BREAST Right 2017    BLADDER SURGERY      bladder interstim with removal    CARDIAC CATHETERIZATION      CATARACT EXTRACTION Left 07/18/2017    IR ABDOMINAL AORTOGRAM  4/16/2003    IR AORTIC ARCH 4 VESSEL ANGIOGRAM  4/16/2003    IR MISCELLANEOUS PROCEDURE  4/16/2003    LUMPECTOMY BREAST Left 06/2017    x2    PICC DOUBLE LUMEN PLACEMENT  4/11/2022         PICC TRIPLE LUMEN PLACEMENT  4/7/2022         ZZC OPEN TX FEMORAL FRACTURE DISTAL MED/LAT CONDYLE Left 10/28/2015    Procedure: OPEN REDUCTION INTERNAL FIXATION LEFT  PROXIMAL FEMUR PERIPROSTHETIC FRACTURE;  Surgeon: Yovanny Albarran MD;  Location: [facility] OR;  Service: Orthopedics        Family History      Family History   Problem Relation Age of Onset    Osteoporosis Other     Prostate Cancer Brother     Breast Cancer Maternal Aunt         age thought to be in her 70's-80's    Prostate Cancer Maternal Uncle          Social History      .  Social History     Socioeconomic History    Marital status:      Spouse name: Not on file    Number of children: Not on file    Years of education: Not on file    Highest education level: Not on file   Occupational History    Not on file   Tobacco Use    Smoking status: Former     Types: Cigarettes     Quit date: 10/28/2007     Years since quitting: 15.8    Smokeless tobacco: Former     Quit date: 9/6/2004   Vaping Use    Vaping Use: Former   Substance and Sexual Activity    Alcohol use: No    Drug use: No    Sexual activity: Not on file   Other Topics Concern    Not on file   Social History Narrative     and lives in home with 3 flight of steps     Social Determinants of Health     Financial Resource Strain: Not on file   Food Insecurity: Not on file   Transportation Needs: Not on file   Physical Activity: Not  on file   Stress: Not on file   Social Connections: Not on file   Intimate Partner Violence: Not on file   Housing Stability: Not on file          Allergies        Allergies   Allergen Reactions    Sulfa (Sulfonamide Antibiotics) [Sulfa Antibiotics] Rash     Headaches and dizziness.    Homeopathic Drugs [External Allergen Needs Reconciliation - See Comment] Unknown     runny nose    Mold Extracts [Molds & Smuts] Unknown    Morphine (Pf) [Morphine] Unknown     hallucinate    Lisinopril Itching, Cough and Unknown     cough    Sulfacetamide Sodium [Sulfacetamide] Rash         Prior to Admission Medications      No current facility-administered medications on file prior to encounter.  acetaminophen (TYLENOL) 500 MG tablet, Take 1,000 mg by mouth every 8 hours as needed for mild pain or fever   albuterol (PROAIR HFA/PROVENTIL HFA/VENTOLIN HFA) 108 (90 Base) MCG/ACT inhaler, Inhale 2 puffs into the lungs every 6 hours as needed for shortness of breath, wheezing or cough  benzonatate (TESSALON PERLES) 100 MG capsule, Take 1 capsule (100 mg) by mouth 3 times daily as needed for cough  CALCIUM-MAGNESIUM-ZINC PO, Take 1 tablet by mouth daily  cetirizine (ZYRTEC) 10 MG tablet, Take 10 mg by mouth daily as needed for allergies  chlorhexidine (PERIDEX) 0.12 % solution, [CHLORHEXIDINE (PERIDEX) 0.12 % SOLUTION] Apply 15 mL to the mouth or throat at bedtime as needed.   escitalopram (LEXAPRO) 10 MG tablet, Take 1 tablet (10 mg) by mouth daily  famotidine (PEPCID) 20 MG tablet, Take 1 tablet (20 mg) by mouth At Bedtime  Fluticasone-Umeclidin-Vilanterol (TRELEGY ELLIPTA) 200-62.5-25 MCG/INH oral inhaler, Inhale 1 puff into the lungs At Bedtime  furosemide (LASIX) 40 MG tablet, Take 1 tablet (40 mg) by mouth 2 times daily for 30 days  gabapentin (NEURONTIN) 600 MG tablet, Take 1 tablet (600 mg) by mouth At Bedtime for 30 days  HYDROmorphone, STANDARD CONC, (DILAUDID) 1 MG/ML oral solution, Take 1 mg by mouth 3 times  "daily  ipratropium (ATROVENT HFA) 17 MCG/ACT inhaler, Inhale 2 puffs into the lungs every 6 hours as needed for wheezing  Melatonin 10 MG TABS tablet, Take 10 mg by mouth At Bedtime  metoprolol succinate ER (TOPROL XL) 50 MG 24 hr tablet, Take 1.5 tablets (75 mg) by mouth At Bedtime  mirabegron (MYRBETRIQ) 50 MG 24 hr tablet, Take 1 tablet (50 mg) by mouth daily  multivitamin w/minerals (THERA-VIT-M) tablet, Take 1 tablet by mouth daily  nystatin (MYCOSTATIN) 894756 UNIT/GM external cream, Apply topically 3 times daily Until clear.  OLANZapine (ZYPREXA) 5 MG tablet, Take 1 tablet (5 mg) by mouth At Bedtime  potassium chloride ER (K-TAB/KLOR-CON) 10 MEQ CR tablet, Take 1 tablet by mouth daily  rivaroxaban ANTICOAGULANT (XARELTO) 15 MG TABS tablet, Take 1 tablet (15 mg) by mouth At Bedtime  simvastatin (ZOCOR) 40 MG tablet, Take 1 tablet (40 mg) by mouth At Bedtime  solifenacin (VESICARE) 5 MG tablet, Take 1 tablet (5 mg) by mouth daily  spironolactone (ALDACTONE) 25 MG tablet, Take 0.5 tablets (12.5 mg) by mouth At Bedtime for 60 days  tamoxifen (NOLVADEX) 20 MG tablet, Take 1 tablet (20 mg) by mouth daily  traMADol (ULTRAM) 50 MG tablet, Take 1 tablet (50 mg) by mouth 2 times daily  vitamin D3 (CHOLECALCIFEROL) 50 mcg (2000 units) tablet, Take 1 tablet by mouth daily  zinc oxide (DESITIN) 40 % external ointment, Apply topically as needed for dry skin or irritation (Twice a day to gluteal area skin irritation)            Review of Systems     A 12 point comprehensive review of systems was negative except as noted above in HPI.    PHYSICAL EXAMINATION       Vitals      Vitals: BP (!) 201/85   Pulse 107   Temp 98.3  F (36.8  C) (Oral)   Resp 24   Ht 1.499 m (4' 11\")   Wt 54 kg (119 lb)   SpO2 95%   BMI 24.04 kg/m    BMI= Body mass index is 24.04 kg/m .      Examination     General Appearance:  sedated no distress  Head:    Normocephalic, without obvious abnormality, atraumatic  EENT:  PERRL, conjunctiva/corneas " clear, EOM's intact.   Neck:   Supple, symmetrical, trachea midline, no adenopathy; no NVE  Back:  Symmetric, no curvature, no CVA tenderness  Chest/Lungs: decreased air entry, (+) rhonchi, mild wheeze. respirations unlabored, No tenderness or deformity. No abdominal breathing or use of accessory muscles.   Heart:    Regular rate and rhythm, S1 and S2 normal, no murmur, rub   or gallop  Abdomen: Soft, non-tender, bowel sounds active all four quadrants, not peritoneal on palpation. Not distended  Extremities:  janet leg swelling, signs of chronic venous insufficiency, skin with post inflammatory hyperpigmentation   Skin:  Skin color, texture, turgor normal, no rashes or lesion  Neurologic: sedated           Pertinent Lab     Results for orders placed or performed during the hospital encounter of 08/27/23   XR Chest Port 1 View    Impression    IMPRESSION: Heart size and pulmonary vascularity normal. Emphysema. Left diaphragmatic eventration accounting for the density in the left lung base. Subsegmental atelectasis or scarring both lower lobes. 2.5 cm mass mid right lung as seen on CT. No new   infiltrates or effusions.   Basic metabolic panel   Result Value Ref Range    Sodium 143 136 - 145 mmol/L    Potassium 3.8 3.5 - 5.0 mmol/L    Chloride 107 98 - 107 mmol/L    Carbon Dioxide (CO2) 26 22 - 31 mmol/L    Anion Gap 10 5 - 18 mmol/L    Urea Nitrogen 22 8 - 28 mg/dL    Creatinine 1.81 (H) 0.60 - 1.10 mg/dL    Calcium 9.3 8.5 - 10.5 mg/dL    Glucose 102 70 - 125 mg/dL    GFR Estimate 28 (L) >60 mL/min/1.73m2   Troponin I (now)   Result Value Ref Range    Troponin I 0.07 0.00 - 0.29 ng/mL   B-Type Natriuretic Peptide ([facility] Only)   Result Value Ref Range     (H) 0 - 147 pg/mL   Result Value Ref Range    INR 1.05 0.85 - 1.15   Symptomatic Influenza A/B, RSV, & SARS-CoV2 PCR (COVID-19) Nasopharyngeal    Specimen: Nasopharyngeal; Swab   Result Value Ref Range    Influenza A PCR Negative Negative    Influenza B PCR  Negative Negative    RSV PCR Negative Negative    SARS CoV2 PCR Negative Negative   CBC with platelets and differential   Result Value Ref Range    WBC Count 8.3 4.0 - 11.0 10e3/uL    RBC Count 3.85 3.80 - 5.20 10e6/uL    Hemoglobin 11.0 (L) 11.7 - 15.7 g/dL    Hematocrit 35.5 35.0 - 47.0 %    MCV 92 78 - 100 fL    MCH 28.6 26.5 - 33.0 pg    MCHC 31.0 (L) 31.5 - 36.5 g/dL    RDW 14.1 10.0 - 15.0 %    Platelet Count 209 150 - 450 10e3/uL    % Neutrophils 67 %    % Lymphocytes 18 %    % Monocytes 11 %    % Eosinophils 3 %    % Basophils 1 %    % Immature Granulocytes 0 %    NRBCs per 100 WBC 0 <1 /100    Absolute Neutrophils 5.5 1.6 - 8.3 10e3/uL    Absolute Lymphocytes 1.5 0.8 - 5.3 10e3/uL    Absolute Monocytes 0.9 0.0 - 1.3 10e3/uL    Absolute Eosinophils 0.3 0.0 - 0.7 10e3/uL    Absolute Basophils 0.1 0.0 - 0.2 10e3/uL    Absolute Immature Granulocytes 0.0 <=0.4 10e3/uL    Absolute NRBCs 0.0 10e3/uL           Pertinent Radiology

## 2023-08-27 NOTE — ED TRIAGE NOTES
Pt was at home short of breath, EMS got there and pt was 80% on 4 L. Pt was hypertensive and EMS gave 2 nitros, they tried CPAP pt is claustrophobic and could not tolerated it. Pt has a new diagnosis of lung cancer and a history of breast cancer     Triage Assessment       Row Name 08/27/23 0029       Triage Assessment (Adult)    Airway WDL WDL       Respiratory WDL    Respiratory WDL rhythm/pattern;cough    Rhythm/Pattern, Respiratory shortness of breath;tachypneic    Cough Frequency frequent    Cough Type congested       Skin Circulation/Temperature WDL    Skin Circulation/Temperature WDL WDL       Cardiac WDL    Cardiac WDL WDL       Peripheral/Neurovascular WDL    Peripheral Neurovascular WDL capillary refill    Capillary Refill, General greater than 3 secs       Cognitive/Neuro/Behavioral WDL    Cognitive/Neuro/Behavioral WDL WDL

## 2023-08-27 NOTE — PROGRESS NOTES
[facility]    Medicine Progress Note - Hospitalist Service    Date of Admission:  8/27/2023    Assessment & Plan   Acute on chronic hypoxic respiratory failure  Acute on chronic heart failure with reduced ejection fraction  COPD with acute exacerbation  Essential hypertension  Lung cancer  Chronic kidney disease stage IV  Anemia chronic kidney disease    Patient is feeling much better at the time of my visit.  Upon taking history I think that she had a dietary indiscretion and did not take her Lasix which resulted in pulmonary edema and hypoxia.  She did receive Lasix in the ER and diuresed about a liter and then felt better.  Long discussion with patient and daughter about compliance with medication salt restriction etc.  We will continue current treatments with steroids and antibiotics.  Discussed directly with cardiology and will resume home cardiac regimen.  Likely discharge in a.m. if continues to do well.       Diet: Low Saturated Fat Na <2400 mg    DVT Prophylaxis: DOAC  Hayes Catheter: Not present  Lines: None     Cardiac Monitoring: ACTIVE order. Indication: Acute decompensated heart failure (48 hours)  Code Status: Full Code      Clinically Significant Risk Factors Present on Admission              # Hypoalbuminemia: Lowest albumin = 3.1 g/dL at 8/27/2023  6:08 AM, will monitor as appropriate  # Drug Induced Coagulation Defect: home medication list includes an anticoagulant medication    # Hypertension: Noted on problem list  # Acute heart failure with reduced ejection fraction: last echo with EF <40% and receiving IV diuretics         # COPD: noted on problem list        Disposition Plan      Expected Discharge Date: 08/29/2023                  Vivek Fu MD  Hospitalist Service  [facility]  Securely message with [employer] (more info)  Text page via [employer] [email or web address]  "  ______________________________________________________________________    Interval History   Patient states that she is feeling much better.  Essentially at her baseline.  She did endorse eating mulligan in the morning of her admission and did not take her Lasix as it \"makes me urinate too much\".  Long discussion about compliance with medication if she does not want to have recurrence of these episodes.  Daughter present at bedside.  She verbalized her understanding.  No chest pain.    Physical Exam   Vital Signs: Temp: 97.7  F (36.5  C) Temp src: Oral BP: 117/60 Pulse: 103   Resp: 20 SpO2: 92 % O2 Device: Nasal cannula Oxygen Delivery: 3 LPM  Weight: 110 lbs 3.68 oz  GENERAL:  Alert, appears comfortable, in no acute distress, appears stated age   HEAD:  Normocephalic, without obvious abnormality, atraumatic   EYES:  PERRL, conjunctiva/corneas clear, no scleral icterus, EOM's intact   NOSE: Nares normal, septum midline, mucosa normal, no drainage   NECK: Supple, symmetrical, trachea midline   LUNGS:   Relatively clear with some crackles in the right base, no wheezing   HEART:  Regular rate and rhythm, S1 and S2 normal, no murmur, rub, or gallop    ABDOMEN:   Soft, non-tender, bowel sounds active all four quadrants, no masses, no organomegaly, no rebound or guarding   EXTREMITIES: Extremities normal, atraumatic, no cyanosis or edema    SKIN: Dry to touch, no exanthems in the visualized areas   NEURO: Alert, oriented x 4, moves all four extremities freely/spontaneously   PSYCH: Cooperative, behavior is appropriate          61 MINUTES SPENT BY ME on the date of service doing chart review, history, exam, documentation & further activities per the note.      Data     I have personally reviewed the following data over the past 24 hrs:    8.1  \   11.8   / 212     143 105 20 /  143 (H)   3.7 26 1.81 (H) \     ALT: <9 AST: 18 AP: 57 TBILI: 0.4   ALB: 3.1 (L) TOT PROTEIN: 7.5 LIPASE: N/A     Trop: N/A BNP: 588 (H)     Procal: " 0.04 CRP: N/A Lactic Acid: N/A       INR:  1.05 PTT:  N/A   D-dimer:  N/A Fibrinogen:  N/A       Imaging results reviewed over the past 24 hrs:   Recent Results (from the past 24 hour(s))   XR Chest Port 1 View    Narrative    EXAM: XR CHEST PORT 1 VIEW  LOCATION: [facility]  DATE: 8/27/2023    INDICATION: sob  COMPARISON: Chest x-ray 07/03/2023 and CT chest, abdomen and pelvis 08/11/2023      Impression    IMPRESSION: Heart size and pulmonary vascularity normal. Emphysema. Left diaphragmatic eventration accounting for the density in the left lung base. Subsegmental atelectasis or scarring both lower lobes. 2.5 cm mass mid right lung as seen on CT. No new   infiltrates or effusions.   US Lower Extremity Venous Duplex Bilateral    Narrative    EXAM: ULTRASOUND LOWER EXTREMITY VENOUS DUPLEX BILATERAL  LOCATION: [facility]  DATE: 08/27/2023    INDICATION: Shortness of breath, hypoxia, unable to get CT scan due to kidney function. History of lung cancer with acute on chronic hypoxic respiratory failure.  Bilateral lower extremity swelling noted.  COMPARISON: None.  TECHNIQUE: Venous Duplex ultrasound of bilateral lower extremities with and without compression, augmentation and duplex. Color flow and spectral Doppler with waveform analysis performed.    FINDINGS: Exam includes the common femoral, femoral, popliteal veins as well as segmentally visualized deep calf veins and greater saphenous vein.     RIGHT: No deep vein thrombosis. No superficial thrombophlebitis. No popliteal cyst.    LEFT: No deep vein thrombosis. No superficial thrombophlebitis. No popliteal cyst.    Small anechoic cystic area in the mid left thigh, of indeterminate etiology      Impression    IMPRESSION:  1.  No deep venous thrombosis in the bilateral lower extremities.  2.  Small anechoic cystic area in the mid left thigh, of indeterminate etiology.

## 2023-08-27 NOTE — ED PROVIDER NOTES
EMERGENCY DEPARTMENT ENCOUNTER      NAME: Irene León  AGE: 78 year old female  YOB: 1945  MRN: [id number]  EVALUATION DATE & TIME: 8/27/2023 12:25 AM    PCP: Pankaj Montanez    ED PROVIDER: Gail Harris M.D.      Chief Complaint   Patient presents with    Shortness of Breath         FINAL IMPRESSION:  1. Acute on chronic respiratory failure with hypoxia (H)        MEDICAL DECISION MAKING:    Pertinent Labs & Imaging studies reviewed. (See chart for details)  ED Course as of 08/27/23 0233   Sun Aug 27, 2023   0059 Afebrile.  Vital signs here with tachycardia, hypertension.  Oxygen saturation 94% on 6 L by nasal cannula.  Patient is coming in for evaluation of shortness of breath.  She has a history of end-stage COPD, CHF as well as new diagnosis fairly aggressive lung cancer.  She states today she has been doing well but then became suddenly acutely short of breath.  She has had a cough this been ongoing.  She says cough is generally chronic for her but seems to be getting worse.  She is not able to cough up any material.  She did not take her Lasix earlier today and she has reported that she has had bilateral swelling in her lower extremities that seems to be getting worse.  She is on oxygen at baseline 2 to 4 L.  She has not had to go up at home prior to today.    On arrival by EMS patient in respiratory extremis with oxygen saturations in the high 70s to low 80s.  They gave her 2 nitroglycerin tablets initially tried CPAP but patient was unable to tolerate secondary to to some claustrophobia.  They put her on a nonrebreather and she had significant improvement.  She arrives here and states that she does feel significantly improved.  Certainly concern for possible flash pulmonary edema versus COPD exacerbation versus PE given her history of lung cancer.  Here when changing her into a gown she becomes acutely more short of breath and needs several minutes to recover.  Placed back on an oxy mask  with 10 L in order to do this.  She has diminished breath sounds bilaterally with crackles appreciated up to the midlung bilaterally.  She has 3+ pitting edema bilateral lower extremities which she states is slightly worse than her baseline.  Heart regular rate and rhythm, abdomen soft nontender.       0059 Patient's EKG here shows sinus rhythm with PVCs at a rate of 100.  There is left axis deviation.  Intraventricular conduction delay.  No signs for acute ischemia.  QTc here is 490, , .  As compared with previous EKG from July 3, 2023 PVCs now present, ventricular rate has increased.    Started on sepsis labs here, swab for COVID, sent for infectious labs.  Initially we will get a chest.  I am here for component of pulmonary edema as well.  She is not willing to try BiPAP here.  She is DNR/DNI.  We will give Lasix, Solu-Medrol, DuoNeb.  Once she is more stable will consider transferring for CT scan of the chest to rule out PE.   0159 Reviewed patient's labs here.  BNP is elevated at 456.  Creatinine 1.81 which seems to be at her baseline.   0218 BNP is elevated at 456.  Troponin is 0.07.  Respiratory swabs negative.  Was given Lasix, has had to urinate quite urgently frequently.  At these times she gets out of bed onto herself.  And when she gets out of bed she takes off her oxygen and that her oxygen saturation drops to the 80s.  It does take her several minutes to get her oxygen saturations back up and during that time she gets tachycardic and tachypneic.  Patient was encouraged to keep her oxygen mask on and read slowly.  Was given 0.5 mg of Ativan to see if that will help with this.  I did think about giving her some morphine for air hunger but she has an allergy to morphine.  She otherwise when she is sitting in bed with her mask on and talking she looks comfortable.  She does have a poor GFR.  I am going to be unable to get a CTA PE.  In the past she has had.  Her chest x-ray here shows her  known tumor but no evidence of any effusions.  No pneumonia visualized.  Do believe the patient will need VQ scan done at some point.  I did order that here in the emergency department.  At this point we will plan on admission for acute on chronic hypoxic respiratory failure.     Spoke with hospitalist for admission.    Medical Decision Making    History:  Supplemental history from: Documented in chart, if applicable  External Record(s) reviewed: Documented in chart, if applicable.    Work Up:  Chart documentation includes differential considered and any EKGs or imaging independently interpreted by provider, where specified.  In additional to work up documented, I considered the following work up: Documented in chart, if applicable.    External consultation:  Discussion of management with another provider: Documented in chart, if applicable    Complicating factors:  Care impacted by chronic illness: Cancer/Chemotherapy, Chronic Kidney Disease, Chronic Lung Disease, Heart Disease, Hyperlipidemia, Hypertension, and Mental Health  Care affected by social determinants of health: Access to Medical Care    Disposition considerations: Admit.      Critical care: 0 minutes excluding separately billable procedures.  Includes bedside management, time reviewing test results, review of records, discussing the case with staff, documenting the medical record and time spent with family members (or surrogate decision makers) discussing specific treatment issues.          ED COURSE:  12:29 AM I met with the patient to gather history and to perform my initial exam. We discussed plans for the ED course, including diagnostic testing and treatment.     The importance of close follow up was discussed. We reviewed warning signs and symptoms, and I instructed Ms. León to return to the emergency department immediately if she develops any new or worsening symptoms. I provided additional verbal discharge instructions. Ms. León expressed  understanding and agreement with this plan of care, her questions were answered, and she was discharged in stable condition.     MEDICATIONS GIVEN IN THE EMERGENCY:  Medications   furosemide (LASIX) injection 40 mg (40 mg Intravenous $Given 8/27/23 0105)   ipratropium - albuterol 0.5 mg/2.5 mg/3 mL (DUONEB) neb solution 3 mL (3 mLs Nebulization $Given 8/27/23 0109)   methylPREDNISolone sodium succinate (solu-MEDROL) injection 125 mg (125 mg Intravenous $Given 8/27/23 0105)   LORazepam (ATIVAN) injection 0.5 mg (0.5 mg Intravenous $Given 8/27/23 0139)       NEW PRESCRIPTIONS STARTED AT TODAY'S ER VISIT:  New Prescriptions    No medications on file          =================================================================    HPI    Patient information was obtained from: Patient and EMS    Use of : N/A       Irene León is a 78 year old female with a pertinent medical history of centrilobular emphysema, COPD, asthma, atrial fibrillation, heart failure with reduced ejection fraction, CHF, CAD, cardiomyopathy, pneumonia, ANATOLIY, CKD, acute kidney failure, HTN, HLD, chronic anemia, cerebral infarction, TIA, breast cancer, severe sepsis with septic shock, who presents to the ED for evaluation of shortness of breath.    On arrival by EMS patient in respiratory extremis with oxygen saturations in the high 70s to low 80s. They gave her 2 nitroglycerin tablets initially tried CPAP but patient was unable to tolerate secondary to to some claustrophobia. They put her on a nonrebreather and she had significant improvement. She arrives here and states that she does feel significantly improved.      Patient reports she is coming in for evaluation of shortness of breath. She has a history of end-stage COPD, CHF, and a new diagnosis fairly aggressive lung cancer. She states today she has been doing well but then became suddenly acutely short of breath. She has had a cough this been ongoing. She says cough is generally  chronic for her but seems to be getting worse. She is not able to cough up any material. She did not take her Lasix earlier today and reported that she has had bilateral swelling in her lower extremities that seems to be getting worse. She is on oxygen at baseline 2 to 4 L, and she has not had to go up at home prior to today. No other complications at this time.      REVIEW OF SYSTEMS   Review of Systems   Respiratory:  Positive for cough and shortness of breath.    Cardiovascular:  Positive for leg swelling (bilateral).   All other systems reviewed and are negative.        PAST MEDICAL HISTORY:  Past Medical History:   Diagnosis Date    ANATOLIY (acute kidney injury) (H)     Allergic rhinitis     Arrhythmia     Atrial fibrillation with RVR (H)     Bacteremia     Breast cancer (H) 2017    Cardiomyopathy (H)     Centrilobular emphysema (H)     CHF (congestive heart failure) (H)     CKD (chronic kidney disease)     COPD (chronic obstructive pulmonary disease) (H)     Coronary artery disease     Depression     Dysphagia     E. coli sepsis (H)     Factor 5 Leiden mutation, heterozygous (H)     GERD (gastroesophageal reflux disease)     Heart failure with reduced ejection fraction (H) 4/17/2023    Hx of radiation therapy 2017    Hyperlipidemia     Hypertension     Hypokalemia     Hypomagnesemia     Idiopathic cardiomyopathy (H)     Left hip pain 4/30/2014    OAB (overactive bladder)     Osteoporosis     Sacral insufficiency fracture     Sinusitis     Syncope     Urge incontinence     Vocal cord dysfunction        PAST SURGICAL HISTORY:  Past Surgical History:   Procedure Laterality Date    ARTHROPLASTY REVISION HIP Left     BIOPSY BREAST Right 2017    BLADDER SURGERY      bladder interstim with removal    CARDIAC CATHETERIZATION      CATARACT EXTRACTION Left 07/18/2017    IR ABDOMINAL AORTOGRAM  4/16/2003    IR AORTIC ARCH 4 VESSEL ANGIOGRAM  4/16/2003    IR MISCELLANEOUS PROCEDURE  4/16/2003    LUMPECTOMY BREAST Left 06/2017     x2    PICC DOUBLE LUMEN PLACEMENT  4/11/2022         PICC TRIPLE LUMEN PLACEMENT  4/7/2022         ZZC OPEN TX FEMORAL FRACTURE DISTAL MED/LAT CONDYLE Left 10/28/2015    Procedure: OPEN REDUCTION INTERNAL FIXATION LEFT  PROXIMAL FEMUR PERIPROSTHETIC FRACTURE;  Surgeon: Yovanny Albarran MD;  Location: [facility];  Service: Orthopedics       CURRENT MEDICATIONS:    No current facility-administered medications for this encounter.    Current Outpatient Medications:     acetaminophen (TYLENOL) 500 MG tablet, Take 1,000 mg by mouth every 8 hours as needed for mild pain or fever , Disp: , Rfl:     albuterol (PROAIR HFA/PROVENTIL HFA/VENTOLIN HFA) 108 (90 Base) MCG/ACT inhaler, Inhale 2 puffs into the lungs every 6 hours as needed for shortness of breath, wheezing or cough, Disp: , Rfl:     benzonatate (TESSALON PERLES) 100 MG capsule, Take 1 capsule (100 mg) by mouth 3 times daily as needed for cough, Disp: 90 capsule, Rfl: 3    CALCIUM-MAGNESIUM-ZINC PO, Take 1 tablet by mouth daily, Disp: , Rfl:     cetirizine (ZYRTEC) 10 MG tablet, Take 10 mg by mouth daily as needed for allergies, Disp: , Rfl:     chlorhexidine (PERIDEX) 0.12 % solution, [CHLORHEXIDINE (PERIDEX) 0.12 % SOLUTION] Apply 15 mL to the mouth or throat at bedtime as needed. , Disp: , Rfl:     escitalopram (LEXAPRO) 10 MG tablet, Take 1 tablet (10 mg) by mouth daily, Disp: 90 tablet, Rfl: 3    famotidine (PEPCID) 20 MG tablet, Take 1 tablet (20 mg) by mouth At Bedtime, Disp: 90 tablet, Rfl: 3    Fluticasone-Umeclidin-Vilanterol (TRELEGY ELLIPTA) 200-62.5-25 MCG/INH oral inhaler, Inhale 1 puff into the lungs At Bedtime, Disp: , Rfl:     furosemide (LASIX) 40 MG tablet, Take 1 tablet (40 mg) by mouth 2 times daily for 30 days, Disp: 60 tablet, Rfl: 0    gabapentin (NEURONTIN) 600 MG tablet, Take 1 tablet (600 mg) by mouth At Bedtime for 30 days, Disp: 30 tablet, Rfl: 0    HYDROmorphone, STANDARD CONC, (DILAUDID) 1 MG/ML oral solution, Take 1 mg by  mouth 3 times daily, Disp: , Rfl:     ipratropium (ATROVENT HFA) 17 MCG/ACT inhaler, Inhale 2 puffs into the lungs every 6 hours as needed for wheezing, Disp: , Rfl:     Melatonin 10 MG TABS tablet, Take 10 mg by mouth At Bedtime, Disp: , Rfl:     metoprolol succinate ER (TOPROL XL) 50 MG 24 hr tablet, Take 1.5 tablets (75 mg) by mouth At Bedtime, Disp: 135 tablet, Rfl: 1    mirabegron (MYRBETRIQ) 50 MG 24 hr tablet, Take 1 tablet (50 mg) by mouth daily, Disp: 90 tablet, Rfl: 3    multivitamin w/minerals (THERA-VIT-M) tablet, Take 1 tablet by mouth daily, Disp: , Rfl:     nystatin (MYCOSTATIN) 667515 UNIT/GM external cream, Apply topically 3 times daily Until clear., Disp: 30 g, Rfl: 3    OLANZapine (ZYPREXA) 5 MG tablet, Take 1 tablet (5 mg) by mouth At Bedtime, Disp: 30 tablet, Rfl: 3    potassium chloride ER (K-TAB/KLOR-CON) 10 MEQ CR tablet, Take 1 tablet by mouth daily, Disp: , Rfl:     rivaroxaban ANTICOAGULANT (XARELTO) 15 MG TABS tablet, Take 1 tablet (15 mg) by mouth At Bedtime, Disp: , Rfl:     simvastatin (ZOCOR) 40 MG tablet, Take 1 tablet (40 mg) by mouth At Bedtime, Disp: 90 tablet, Rfl: 3    solifenacin (VESICARE) 5 MG tablet, Take 1 tablet (5 mg) by mouth daily, Disp: 90 tablet, Rfl: 3    spironolactone (ALDACTONE) 25 MG tablet, Take 0.5 tablets (12.5 mg) by mouth At Bedtime for 60 days, Disp: 15 tablet, Rfl: 1    tamoxifen (NOLVADEX) 20 MG tablet, Take 1 tablet (20 mg) by mouth daily, Disp: 90 tablet, Rfl: 3    traMADol (ULTRAM) 50 MG tablet, Take 1 tablet (50 mg) by mouth 2 times daily, Disp: 60 tablet, Rfl: 0    vitamin D3 (CHOLECALCIFEROL) 50 mcg (2000 units) tablet, Take 1 tablet by mouth daily, Disp: , Rfl:     zinc oxide (DESITIN) 40 % external ointment, Apply topically as needed for dry skin or irritation (Twice a day to gluteal area skin irritation), Disp: 113 g, Rfl: 1    ALLERGIES:  Allergies   Allergen Reactions    Sulfa (Sulfonamide Antibiotics) [Sulfa Antibiotics] Rash     Headaches and  "dizziness.    Homeopathic Drugs [External Allergen Needs Reconciliation - See Comment] Unknown     runny nose    Mold Extracts [Molds & Smuts] Unknown    Morphine (Pf) [Morphine] Unknown     hallucinate    Lisinopril Itching, Cough and Unknown     cough    Sulfacetamide Sodium [Sulfacetamide] Rash       FAMILY HISTORY:  Family History   Problem Relation Age of Onset    Osteoporosis Other     Prostate Cancer Brother     Breast Cancer Maternal Aunt         age thought to be in her 70's-80's    Prostate Cancer Maternal Uncle        SOCIAL HISTORY:   Social History     Socioeconomic History    Marital status:    Tobacco Use    Smoking status: Former     Types: Cigarettes     Quit date: 10/28/2007     Years since quitting: 15.8    Smokeless tobacco: Former     Quit date: 9/6/2004   Vaping Use    Vaping Use: Former   Substance and Sexual Activity    Alcohol use: No    Drug use: No   Social History Narrative     and lives in home with 3 flight of steps       PHYSICAL EXAM:    Vitals: BP (!) 201/85   Pulse 107   Temp 98.3  F (36.8  C) (Oral)   Resp 24   Ht 1.499 m (4' 11\")   Wt 54 kg (119 lb)   SpO2 95%   BMI 24.04 kg/m     General:. Alert and interactive, comfortable appearing.  HENT: Oropharynx without erythema or exudates. MMM.  TMs clear bilaterally.  Eyes: Pupils mid-sized and equally reactive.   Neck: Full AROM.  No midline tenderness to palpation.  Cardiovascular: Regular rate and rhythm. Peripheral pulses 2+ bilaterally.  Chest/Pulmonary: Diminished breath sounds bilaterally with crackles appreciated up to the midlung bilaterally. No chest wall tenderness or deformities.  Abdomen: Soft, nondistended. Nontender without guarding or rebound.  Back/Spine: No CVA or midline tenderness.  Extremities: Bilateral lower extremities with 3+ pitting edema. Normal ROM of all major joints.   Skin: Warm and dry. Normal skin color.   Neuro: Speech clear. CNs grossly intact. Moves all extremities appropriately. " Strength and sensation grossly intact to all extremities.   Psych: Normal affect/mood, cooperative, memory appropriate.     LAB:  All pertinent labs reviewed and interpreted.  Labs Ordered and Resulted from Time of ED Arrival to Time of ED Departure   BASIC METABOLIC PANEL - Abnormal       Result Value    Sodium 143      Potassium 3.8      Chloride 107      Carbon Dioxide (CO2) 26      Anion Gap 10      Urea Nitrogen 22      Creatinine 1.81 (*)     Calcium 9.3      Glucose 102      GFR Estimate 28 (*)    B-TYPE NATRIURETIC PEPTIDE ([facility] ONLY) - Abnormal     (*)    CBC WITH PLATELETS AND DIFFERENTIAL - Abnormal    WBC Count 8.3      RBC Count 3.85      Hemoglobin 11.0 (*)     Hematocrit 35.5      MCV 92      MCH 28.6      MCHC 31.0 (*)     RDW 14.1      Platelet Count 209      % Neutrophils 67      % Lymphocytes 18      % Monocytes 11      % Eosinophils 3      % Basophils 1      % Immature Granulocytes 0      NRBCs per 100 WBC 0      Absolute Neutrophils 5.5      Absolute Lymphocytes 1.5      Absolute Monocytes 0.9      Absolute Eosinophils 0.3      Absolute Basophils 0.1      Absolute Immature Granulocytes 0.0      Absolute NRBCs 0.0     TROPONIN I - Normal    Troponin I 0.07     INR - Normal    INR 1.05     INFLUENZA A/B, RSV, & SARS-COV2 PCR - Normal    Influenza A PCR Negative      Influenza B PCR Negative      RSV PCR Negative      SARS CoV2 PCR Negative         RADIOLOGY:  XR Chest Port 1 View   Final Result   IMPRESSION: Heart size and pulmonary vascularity normal. Emphysema. Left diaphragmatic eventration accounting for the density in the left lung base. Subsegmental atelectasis or scarring both lower lobes. 2.5 cm mass mid right lung as seen on CT. No new    infiltrates or effusions.      Lung perfusion scan (NM)    (Results Pending)   US Lower Extremity Venous Duplex Bilateral    (Results Pending)       EKG:  See MDM      PROCEDURES:   None.       Dereje CAMARENA, am serving as a scribe to  document services personally performed by Dr. Gail Harris  based on my observation and the provider's statements to me. I, Gail Harris MD attest that Dereje Schmitt is acting in a scribe capacity, has observed my performance of the services and has documented them in accordance with my direction.      Gail Harris M.D.  Emergency Medicine  [facility] EMERGENCY ROOM  [address] [id number]  [phone]  Dept: [phone]       Gail Harris MD  08/27/23 0230

## 2023-08-27 NOTE — PROVIDER NOTIFICATION
08/27/23 0755   RCAT Assessment   Reason for Assessment CHF   Pulmonary Status 3   Surgical Status 0   Chest X-ray 0  (no cxr)   Respiratory Pattern 2   Mental Status 0   Breath Sounds 1   Cough Effectiveness 0   Level of Activity 0   O2 Required for SpO2>=92% 2   Acuity Level (points) 8   Acuity Level  4   Aerosol Therapy (SVN)   Daily Review of Necessity (SVN) completed   Respiratory Treatment Status (SVN) given   Patient Position HOB elevated   Posttreatment Assessment (SVN) breath sounds unchanged     Pt states she uses 3-4L oxygen at home and uses nebs PRN. No CPAP or history of CHASTITY

## 2023-08-27 NOTE — CONSULTS
HEART CARE CONSULTATON NOTE        Assessment/Recommendations   Assessment:   Acute on chronic systolic Heart Failure (HFrEF), non-ischemic.  ? LBBB related  Severe COPD on home O2 (chronic hypoxia)  Minimal CAD on prior angiogram  4.  Paroxsymal atrial fib  5.  CKD stage IV  6. Acute hypoxia, now on home O2.     Plan:   Patient appears fairly euvolemic on examination today.  Resume home Lasix at 40 mg twice daily  Patient received IV Lasix in ED.  No further needed  Continue continue metoprolol XL, Aldactone.  She may benefit from afterload reduction with Imdur if blood pressure tolerates.  Currently on antibiotic therapy per medicine team.    Cardiologist: Dr. Chilel       Clinically Significant Risk Factors Present on Admission              # Hypoalbuminemia: Lowest albumin = 3.1 g/dL at 8/27/2023  6:08 AM, will monitor as appropriate  # Drug Induced Coagulation Defect: home medication list includes an anticoagulant medication    # Hypertension: Noted on problem list  # Acute heart failure with reduced ejection fraction: last echo with EF <40% and receiving IV diuretics         # COPD: noted on problem list        History of Present Illness/Subjective    HPI: Irene León is a 78 year old female history of congestive heart failure secondary educed ejection fraction, A-fib, chronic kidney disease who presents to [facility] with main complaint of leg pain was noted to be hypoxic and hypertensive.    Patient was admitted due to hypoxia which has resolved.  She has no evidence of significant volume retention.  Her weight is at her baseline weight.  She was given 1 dose of IV Lasix in the ED.  She is currently on 3 L nasal cannula which is her home oxygen needs.  She is somewhat lethargic today but answering questions appropriately.  Her main complaint was coming in for leg pain.    For telemetry demonstrates sinus rhythm.  EKG reviewed from ED demonstrated sinus rhythm with left bundle branch  "block.    Recent echocardiogram was reviewed which demonstrated severe reduction left-ventricular ejection fraction.  No significant valvular heart disease.           Physical Examination  Review of Systems   VITALS: /70 (BP Location: Right arm)   Pulse 101   Temp 97.8  F (36.6  C) (Oral)   Resp 20   Ht 1.499 m (4' 11\")   Wt 50 kg (110 lb 3.7 oz)   SpO2 94%   BMI 22.26 kg/m    BMI: Body mass index is 22.26 kg/m .  Wt Readings from Last 3 Encounters:   08/27/23 50 kg (110 lb 3.7 oz)   08/15/23 50.8 kg (112 lb)   08/08/23 51.7 kg (114 lb)       Intake/Output Summary (Last 24 hours) at 8/27/2023 0833  Last data filed at 8/27/2023 0812  Gross per 24 hour   Intake --   Output 1650 ml   Net -1650 ml     General Appearance:   Lying flat in bed.    ENT/Mouth: membranes moist, no oral lesions or bleeding gums.      EYES:  no scleral icterus, normal conjunctivae   Neck: no carotid bruits or thyromegaly   Chest/Lungs:   Decreased breath.   Cardiovascular:   Regular. Normal first and second heart sounds with no murmurs, rubs, or gallops; the carotid, radial and posterior tibial pulses are intact, Jugular venous pressure normal, no edema bilaterally    Abdomen:  no tenderness; bowel sounds are present   Extremities: no cyanosis or clubbing   =             Review Of Systems  Skin: negative  Eyes: negative  Ears/Nose/Throat: negative  Respiratory: Dyspnea  Cardiovascular: Chest pain.  Dyspnea.  Gastrointestinal: negative  Genitourinary: negative  Musculoskeletal: Leg pain  Neurologic: negative  Psychiatric: negative  Hematologic/Lymphatic/Immunologic: negative  Endocrine: negative          Lab Results    Chemistry/lipid CBC Cardiac Enzymes/BNP/TSH/INR   No results for input(s): CHOL, HDL, LDL, TRIG, CHOLHDLRATIO in the last 02302 hours.  No results for input(s): LDL in the last 78749 hours.  Recent Labs   Lab Test 08/27/23  0608      POTASSIUM 3.7   CHLORIDE 105   CO2 26   *   BUN 20   CR 1.81* "   GFRESTIMATED 28*   MESSI 9.3     Recent Labs   Lab Test 08/27/23  0608 08/27/23  0101 08/08/23  1518   CR 1.81* 1.81* 2.03*     No results for input(s): A1C in the last 45923 hours.       Recent Labs   Lab Test 08/27/23  0608   WBC 8.1   HGB 11.8   HCT 37.3   MCV 91        Recent Labs   Lab Test 08/27/23  0608 08/27/23  0101 08/08/23  1518   HGB 11.8 11.0* 10.9*    Recent Labs   Lab Test 08/27/23  0608 08/27/23  0101 07/03/23  1125   TROPONINI 0.07 0.07 0.04     Recent Labs   Lab Test 08/27/23  0608 08/27/23  0101 07/03/23  1125   * 456* 200*     No results for input(s): TSH in the last 03482 hours.  Recent Labs   Lab Test 08/27/23  0101 06/17/23  1217 02/20/21  2040   INR 1.05 1.28* 1.16*        Medical History  Surgical History Family History Social History   Past Medical History:   Diagnosis Date    ANATOLIY (acute kidney injury) (H)     Allergic rhinitis     Arrhythmia     Atrial fibrillation with RVR (H)     Bacteremia     Breast cancer (H) 2017    Cardiomyopathy (H)     Centrilobular emphysema (H)     CHF (congestive heart failure) (H)     CKD (chronic kidney disease)     COPD (chronic obstructive pulmonary disease) (H)     Coronary artery disease     Depression     Dysphagia     E. coli sepsis (H)     Factor 5 Leiden mutation, heterozygous (H)     GERD (gastroesophageal reflux disease)     Heart failure with reduced ejection fraction (H) 4/17/2023    Hx of radiation therapy 2017    Hyperlipidemia     Hypertension     Hypokalemia     Hypomagnesemia     Idiopathic cardiomyopathy (H)     Left hip pain 4/30/2014    OAB (overactive bladder)     Osteoporosis     Sacral insufficiency fracture     Sinusitis     Syncope     Urge incontinence     Vocal cord dysfunction      Past Surgical History:   Procedure Laterality Date    ARTHROPLASTY REVISION HIP Left     BIOPSY BREAST Right 2017    BLADDER SURGERY      bladder interstim with removal    CARDIAC CATHETERIZATION      CATARACT EXTRACTION Left 07/18/2017     IR ABDOMINAL AORTOGRAM  4/16/2003    IR AORTIC ARCH 4 VESSEL ANGIOGRAM  4/16/2003    IR MISCELLANEOUS PROCEDURE  4/16/2003    LUMPECTOMY BREAST Left 06/2017    x2    PICC DOUBLE LUMEN PLACEMENT  4/11/2022         PICC TRIPLE LUMEN PLACEMENT  4/7/2022         ZZC OPEN TX FEMORAL FRACTURE DISTAL MED/LAT CONDYLE Left 10/28/2015    Procedure: OPEN REDUCTION INTERNAL FIXATION LEFT  PROXIMAL FEMUR PERIPROSTHETIC FRACTURE;  Surgeon: Yovanny Albarran MD;  Location: [facility] Main OR;  Service: Orthopedics     Family History   Problem Relation Age of Onset    Osteoporosis Other     Prostate Cancer Brother     Breast Cancer Maternal Aunt         age thought to be in her 70's-80's    Prostate Cancer Maternal Uncle         Social History     Socioeconomic History    Marital status:      Spouse name: Not on file    Number of children: Not on file    Years of education: Not on file    Highest education level: Not on file   Occupational History    Not on file   Tobacco Use    Smoking status: Former     Types: Cigarettes     Quit date: 10/28/2007     Years since quitting: 15.8    Smokeless tobacco: Former     Quit date: 9/6/2004   Vaping Use    Vaping Use: Former   Substance and Sexual Activity    Alcohol use: No    Drug use: No    Sexual activity: Not on file   Other Topics Concern    Not on file   Social History Narrative     and lives in home with 3 flight of steps     Social Determinants of Health     Financial Resource Strain: Not on file   Food Insecurity: Not on file   Transportation Needs: Not on file   Physical Activity: Not on file   Stress: Not on file   Social Connections: Not on file   Intimate Partner Violence: Not on file   Housing Stability: Not on file         Medications  Allergies   No current outpatient medications on file.        Allergies   Allergen Reactions    Sulfa (Sulfonamide Antibiotics) [Sulfa Antibiotics] Rash     Headaches and dizziness.    Homeopathic Drugs [External Allergen Needs  Reconciliation - See Comment] Unknown     runny nose    Mold Extracts [Molds & Smuts] Unknown    Morphine (Pf) [Morphine] Unknown     hallucinate    Lisinopril Itching, Cough and Unknown     cough    Sulfacetamide Sodium [Sulfacetamide] Rash         Alexis Escalante, DO

## 2023-08-28 NOTE — PLAN OF CARE
Pt A&Ox4, able to make needs known. Weaned from 4L oxymask to 3L NC this shift, pt denies any SOB. At baseline for home O2 needs. Pt denies pain, CP, N/V. Pt up in recliner this afternoon, requests to sleep in recliner, reminds importance of shifting weight. Ax1 to toilet in the room. Purewick placed for sleeping. Urgency. Daughter at bedside this morning, received update from MD. Pt appears to be resting comfortably in recliner at end of shift.     Problem: Gas Exchange Impaired  Goal: Optimal Gas Exchange  Outcome: Progressing  Intervention: Optimize Oxygenation and Ventilation  Recent Flowsheet Documentation  Taken 8/27/2023 1200 by Hany Dahl, NATE  Head of Bed (HOB) Positioning: HOB at 60-90 degrees     Problem: Electrolyte Imbalance  Goal: Electrolyte Imbalance: Plan of Care  Outcome: Progressing     Hany Dahl RN on 8/27/2023 at 10:33 PM

## 2023-08-28 NOTE — PROGRESS NOTES
Care Management Discharge Note    Discharge Date: 08/28/2023     Discharge Disposition: Home    Discharge Services:  OP follow up    Discharge DME:  None    Discharge Transportation:  Leelee cruz    Private pay costs discussed: Not applicable    Does the patient's insurance plan have a 3 day qualifying hospital stay waiver?  Not applicable    PAS Confirmation Code:  Not applicable  Patient/family educated on Medicare website which has current facility and service quality ratings:      Education Provided on the Discharge Plan:  yes  Persons Notified of Discharge Plans: patient  Patient/Family in Agreement with the Plan: yes    Handoff Referral Completed: Yes    Additional Information:  Patient is discharging home today with family, with OP follow-up with PCP, Cardiology and Oncology.  Leelee Cruz will transport home.    Catalina Paulino RN

## 2023-08-28 NOTE — PLAN OF CARE
Problem: Risk for Delirium  Goal: Improved Sleep  Outcome: Progressing     Pt A&Ox4, able to make needs known.Pt denies pain this sift. Tele SR w/BBB. Pt sleeping in recliner overnight, encouraged to shift weight in chair as se requested not to have a mepilex on. No other acute changes noted overnight.    Luann Ray, RN  [phone]

## 2023-08-28 NOTE — DISCHARGE SUMMARY
[facility] [place]  Hospitalist Discharge Summary      Date of Admission:  8/27/2023  Date of Discharge:  8/28/2023  Discharging Provider: Vivek Fu MD  Discharge Service: Hospitalist Service    Discharge Diagnoses   Acute on chronic hypoxic respiratory failure  Acute on chronic heart failure with reduced ejection fraction  COPD with acute exacerbation  Essential hypertension  Lung cancer  Chronic kidney disease stage IV  Anemia chronic kidney disease      Follow-ups Needed After Discharge   Follow-up Appointments     Follow-up and recommended labs and tests       Follow up with primary care provider, Pankaj Montanez, within 3-5 days for   hospital follow- up.  The following labs/tests are recommended: bmp,   volume eval.        Follow-up with heart failure nurse practitioner in 2 weeks        Discharge Disposition   Discharged to home  Condition at discharge: Stable    Hospital Course   HPI: 78-year-old female with a history of emphysema, COPD, asthma, atrial fibrillation, heart failure with reduced ejection fraction, chronic kidney disease, and hypertension presented to the ER with hypoxia and shortness of breath.  In the ER she was given Lasix DuoNeb steroids and admitted to the hospital.    Acute on chronic hypoxic respiratory failure/acute on chronic heart failure with reduced ejection fraction  Patient was admitted and diuresed with the Lasix given in the ER.  By the time I see her she is back to baseline essentially.  She was monitored overnight and did well on her home rate of nasal cannula O2.  Ultimately felt that it was dietary indiscretion with increased salt intake and lack of taking oral Lasix.  Cardiology saw the patient and agreed with discharging back to home on her prehospital regimen.  The importance of taking her medications and maintaining salt restriction reiterated multiple times.    Kidney disease stage IIIb-IV  Renal function stable despite diuresis.    Type 2  diabetes  Blood sugars at goal    Lung cancer  Follow-up with oncology as an outpatient      Consultations This Hospital Stay   CARDIOLOGY IP CONSULT    Code Status   Full Code    Time Spent on this Encounter   I, Vivek Fu MD, personally saw the patient today and spent greater than 30 minutes discharging this patient.       Vivek Fu MD  [facility] 3 ICU  [address] [address]  Phone: [phone]  Fax: [phone]  ______________________________________________________________________    Physical Exam   Vital Signs: Temp: 97.6  F (36.4  C) Temp src: Oral BP: 106/68 Pulse: 79   Resp: 18 SpO2: 100 % O2 Device: Nasal cannula Oxygen Delivery: 3 LPM  Weight: 110 lbs 3.68 oz  General Appearance: No apparent distress  Respiratory: Auscultation bilaterally with good respiratory effort  Cardiovascular: Regular rate and rhythm  GI: Soft and nontender visualize skin is normal  Other: Good eye contact with normal affect, lower extremities are without edema       Primary Care Physician   Pankaj Montanez    Discharge Orders      Follow-Up with Cardiology [facility] Heart Failure Discharge      Reason for your hospital stay    Shortness of breath likely due to too much water in your body.     Follow-up and recommended labs and tests     Follow up with primary care provider, Pankaj Montanez, within 3-5 days for hospital follow- up.  The following labs/tests are recommended: bmp, volume eval.     Activity    Your activity upon discharge: activity as tolerated     Diet    Follow this diet upon discharge: Orders Placed This Encounter      Low Saturated Fat, Na <2000 mg       Significant Results and Procedures   Most Recent 3 CBC's:  Recent Labs   Lab Test 08/27/23  0608 08/27/23  0101 08/08/23  1518   WBC 8.1 8.3 13.8*   HGB 11.8 11.0* 10.9*   MCV 91 92 94    209 243     Most Recent 3 BMP's:  Recent Labs   Lab Test 08/28/23  0602 08/27/23  0608 08/27/23  0101   NA  146* 143 143   POTASSIUM 4.4 3.7 3.8   CHLORIDE 105 105 107   CO2 30 26 26   BUN 21 20 22   CR 1.67* 1.81* 1.81*   ANIONGAP 11 12 10   MESSI 8.6 9.3 9.3   * 143* 102     Most Recent 2 LFT's:  Recent Labs   Lab Test 08/27/23  0608 08/08/23  1518   AST 18 21   ALT <9 <9   ALKPHOS 57 59   BILITOTAL 0.4 0.1     Most Recent 3 INR's:  Recent Labs   Lab Test 08/27/23  0101 06/17/23  1217 02/20/21  2040   INR 1.05 1.28* 1.16*   ,   Results for orders placed or performed during the hospital encounter of 08/27/23   XR Chest Port 1 View    Narrative    EXAM: XR CHEST PORT 1 VIEW  LOCATION: [facility]  DATE: 8/27/2023    INDICATION: sob  COMPARISON: Chest x-ray 07/03/2023 and CT chest, abdomen and pelvis 08/11/2023      Impression    IMPRESSION: Heart size and pulmonary vascularity normal. Emphysema. Left diaphragmatic eventration accounting for the density in the left lung base. Subsegmental atelectasis or scarring both lower lobes. 2.5 cm mass mid right lung as seen on CT. No new   infiltrates or effusions.   Lung perfusion scan (NM)    Narrative    EXAM: NM LUNG SCAN PERFUSION PARTICULATE  LOCATION: [facility]  DATE: 8/28/2023    INDICATION: Shortness of breath  COMPARISON: Chest x-ray dated 08/28/2023  TECHNIQUE: 8.5 mCi technetium-99m MAA, IV. Standard lung perfusion imaging.    FINDINGS: Normal pulmonary perfusion without segmental defect.      Impression    IMPRESSION:     No evidence of pulmonary embolism.   US Lower Extremity Venous Duplex Bilateral    Narrative    EXAM: ULTRASOUND LOWER EXTREMITY VENOUS DUPLEX BILATERAL  LOCATION: [facility]  DATE: 08/27/2023    INDICATION: Shortness of breath, hypoxia, unable to get CT scan due to kidney function. History of lung cancer with acute on chronic hypoxic respiratory failure.  Bilateral lower extremity swelling noted.  COMPARISON: None.  TECHNIQUE: Venous Duplex ultrasound of bilateral lower  extremities with and without compression, augmentation and duplex. Color flow and spectral Doppler with waveform analysis performed.    FINDINGS: Exam includes the common femoral, femoral, popliteal veins as well as segmentally visualized deep calf veins and greater saphenous vein.     RIGHT: No deep vein thrombosis. No superficial thrombophlebitis. No popliteal cyst.    LEFT: No deep vein thrombosis. No superficial thrombophlebitis. No popliteal cyst.    Small anechoic cystic area in the mid left thigh, of indeterminate etiology      Impression    IMPRESSION:  1.  No deep venous thrombosis in the bilateral lower extremities.  2.  Small anechoic cystic area in the mid left thigh, of indeterminate etiology.       XR Chest 2 Views    Narrative    EXAM: XR CHEST 2 VIEWS  LOCATION: [facility]  DATE: 8/28/2023    INDICATION: SOB, Hypoxia  COMPARISON: 08/27/2023      Impression    IMPRESSION: Stable chest without new focal airspace opacity. Unchanged nodules in the right midlung and lung emphysematous changes. No pleural effusion or pneumothorax. Stable cardiomediastinal silhouette.       Discharge Medications   Current Discharge Medication List        START taking these medications    Details   doxycycline hyclate (VIBRAMYCIN) 100 MG capsule Take 1 capsule (100 mg) by mouth 2 times daily for 4 days  Qty: 8 capsule, Refills: 0    Associated Diagnoses: COPD exacerbation (H)      ipratropium - albuterol 0.5 mg/2.5 mg/3 mL (DUONEB) 0.5-2.5 (3) MG/3ML neb solution 1 neb 4 times daily for 3 days then q6hr prn sob  Qty: 90 mL, Refills: 0    Associated Diagnoses: COPD exacerbation (H)      predniSONE (DELTASONE) 20 MG tablet Take 2 tablets (40 mg) by mouth daily for 3 days  Qty: 6 tablet, Refills: 0    Associated Diagnoses: COPD exacerbation (H)           CONTINUE these medications which have NOT CHANGED    Details   acetaminophen (TYLENOL) 500 MG tablet Take 1,000 mg by mouth every 8 hours as needed  for mild pain or fever       acetylcysteine (MUCOMYST) 10 % nebulizer solution Inhale 4 mLs into the lungs every 4 hours      albuterol (PROAIR HFA/PROVENTIL HFA/VENTOLIN HFA) 108 (90 Base) MCG/ACT inhaler Inhale 2 puffs into the lungs every 6 hours as needed for shortness of breath, wheezing or cough      Azelastine HCl 137 MCG/SPRAY SOLN Spray 1 spray into both nostrils 2 times daily as needed for rhinitis      benzonatate (TESSALON PERLES) 100 MG capsule Take 1 capsule (100 mg) by mouth 3 times daily as needed for cough  Qty: 90 capsule, Refills: 3    Associated Diagnoses: Pulmonary emphysema, unspecified emphysema type (H)      CALCIUM-MAGNESIUM-ZINC PO Take 1 tablet by mouth daily      cetirizine (ZYRTEC) 10 MG tablet Take 10 mg by mouth daily as needed for allergies      chlorhexidine (PERIDEX) 0.12 % solution [CHLORHEXIDINE (PERIDEX) 0.12 % SOLUTION] Apply 15 mL to the mouth or throat at bedtime as needed.       escitalopram (LEXAPRO) 10 MG tablet Take 1 tablet (10 mg) by mouth daily  Qty: 90 tablet, Refills: 3    Comments: Dose increase  Associated Diagnoses: Anxiety      fluticasone (FLONASE) 50 MCG/ACT nasal spray Spray 1 spray into both nostrils daily as needed for rhinitis or allergies      Fluticasone-Umeclidin-Vilanterol (TRELEGY ELLIPTA) 200-62.5-25 MCG/INH oral inhaler Inhale 1 puff into the lungs At Bedtime      furosemide (LASIX) 40 MG tablet Take 40 mg by mouth 2 times daily      gabapentin (NEURONTIN) 600 MG tablet Take 600 mg by mouth At Bedtime      HYDROmorphone, STANDARD CONC, (DILAUDID) 1 MG/ML oral solution Take 1 mg by mouth 3 times daily as needed for pain      ipratropium (ATROVENT HFA) 17 MCG/ACT inhaler Inhale 2 puffs into the lungs every 6 hours as needed for wheezing      Melatonin 5 MG TBDP Take 15 mg by mouth At Bedtime      metoprolol succinate ER (TOPROL XL) 50 MG 24 hr tablet Take 1.5 tablets (75 mg) by mouth At Bedtime  Qty: 135 tablet, Refills: 1    Associated Diagnoses:  Essential hypertension      mirabegron (MYRBETRIQ) 50 MG 24 hr tablet Take 1 tablet (50 mg) by mouth daily  Qty: 90 tablet, Refills: 3    Associated Diagnoses: Urinary frequency      multivitamin w/minerals (THERA-VIT-M) tablet Take 1 tablet by mouth daily      nystatin (MYCOSTATIN) 181503 UNIT/GM external cream Apply topically 3 times daily as needed for dry skin      OLANZapine (ZYPREXA) 5 MG tablet Take 1 tablet (5 mg) by mouth At Bedtime  Qty: 30 tablet, Refills: 3    Associated Diagnoses: Insomnia due to medical condition      rivaroxaban ANTICOAGULANT (XARELTO) 15 MG TABS tablet Take 1 tablet (15 mg) by mouth At Bedtime      simvastatin (ZOCOR) 40 MG tablet Take 1 tablet (40 mg) by mouth At Bedtime  Qty: 90 tablet, Refills: 3    Associated Diagnoses: Hypercholesterolemia      sodium chloride 0.9 % neb solution Take 3 mLs by nebulization every 3 hours as needed for wheezing      solifenacin (VESICARE) 5 MG tablet Take 1 tablet (5 mg) by mouth daily  Qty: 90 tablet, Refills: 3    Associated Diagnoses: Urinary frequency      spironolactone (ALDACTONE) 25 MG tablet Take 12.5 mg by mouth daily      tamoxifen (NOLVADEX) 20 MG tablet Take 1 tablet (20 mg) by mouth daily  Qty: 90 tablet, Refills: 3    Associated Diagnoses: Malignant neoplasm of central portion of left breast in female, estrogen receptor positive (H)      traMADol (ULTRAM) 50 MG tablet Take 1 tablet (50 mg) by mouth 2 times daily  Qty: 60 tablet, Refills: 0    Associated Diagnoses: Compression fracture of T12 vertebra, sequela; Chronic pain syndrome; Pyriformis syndrome, left      vitamin D3 (CHOLECALCIFEROL) 50 mcg (2000 units) tablet Take 1 tablet by mouth daily      zinc oxide (DESITIN) 40 % external ointment Apply topically as needed for dry skin or irritation (Twice a day to gluteal area skin irritation)  Qty: 113 g, Refills: 1    Associated Diagnoses: Intertrigo      famotidine (PEPCID) 20 MG tablet Take 1 tablet (20 mg) by mouth At Bedtime  Qty:  90 tablet, Refills: 3    Associated Diagnoses: COPD exacerbation (H); Pulmonary emphysema, unspecified emphysema type (H)           Allergies   Allergies   Allergen Reactions    Sulfa (Sulfonamide Antibiotics) [Sulfa Antibiotics] Rash     Headaches and dizziness.    Homeopathic Drugs [External Allergen Needs Reconciliation - See Comment] Unknown     runny nose    Mold Extracts [Molds & Smuts] Unknown    Morphine (Pf) [Morphine] Unknown     hallucinate    Lisinopril Itching, Cough and Unknown     cough    Sulfacetamide Sodium [Sulfacetamide] Rash

## 2023-08-28 NOTE — PROGRESS NOTES
Patient ready to go home.  Reports that her breathing is back to baseline.    Vital signs reviewed.    Chest: Diminished breath sounds but no crackles heard at bases.    Cardiovascular: No increased jugular venous pressure.  S1-S2 with 2/6 systolic murmur.    Extremities: Venous stasis pigmentation changes.  Slight edema.    We will get her set up to see Samantha Rodas in the heart failure clinic in the next 2 to 3 weeks.  I will arrange appointment.

## 2023-08-28 NOTE — PLAN OF CARE
Goal Outcome Evaluation:      Plan of Care Reviewed With: patient    Overall Patient Progress: improvingOverall Patient Progress: improving    Outcome Evaluation: VSS on baseline 3L O2 via NC. Denies pain, SOB. IV ABX infusing. NM lung scan today. Makes needs known. Plan to discharge home with daughter.      Problem: Electrolyte Imbalance  Goal: Electrolyte Imbalance: Plan of Care  Outcome: Progressing     Problem: Gas Exchange Impaired  Goal: Optimal Gas Exchange  Outcome: Progressing     Problem: Plan of Care - These are the overarching goals to be used throughout the patient stay.    Goal: Optimal Comfort and Wellbeing  Outcome: Progressing

## 2023-08-28 NOTE — PROGRESS NOTES
"/68 (BP Location: Right arm)   Pulse 86   Temp 97.4  F (36.3  C) (Oral)   Resp 18   Ht 1.499 m (4' 11\")   Wt 50 kg (110 lb 3.7 oz)   SpO2 95%   BMI 22.26 kg/m      The PT was provided a nebs per MD order. At the start of the shift, BS were slightly coarse with scattered crackle. As the shift progressed, they were generally diminished. RT will continue to follow as directed.  "

## 2023-08-28 NOTE — CONSULTS
Care Management Initial Consult    General Information  Assessment completed with: Patient,    Type of CM/SW Visit: Initial Assessment    Primary Care Provider verified and updated as needed:     Readmission within the last 30 days:     Reason for Consult: discharge planning  Advance Care Planning:          Communication Assessment  Patient's communication style: spoken language (English or Bilingual)    Hearing Difficulty or Deaf: no   Wear Glasses or Blind: no    Cognitive  Cognitive/Neuro/Behavioral: WDL                      Living Environment:   People in home: spouse  Santos  Current living Arrangements: house      Able to return to prior arrangements: yes     Family/Social Support:  Care provided by: self  Provides care for: spouse  Marital Status:   , Children  Santos       Description of Support System: Supportive, Involved       Current Resources:   Patient receiving home care services: No     Community Resources: None  Equipment currently used at home: cane, straight, shower chair, walker, rolling  Supplies currently used at home: None    Employment/Financial:  Employment Status:          Financial Concerns: No concerns identified   Referral to Financial Worker: No     Does the patient's insurance plan have a 3 day qualifying hospital stay waiver?  No    Lifestyle & Psychosocial Needs:  Social Determinants of Health     Tobacco Use: Medium Risk (8/15/2023)    Patient History     Smoking Tobacco Use: Former     Smokeless Tobacco Use: Former     Passive Exposure: Not on file   Alcohol Use: Not on file   Financial Resource Strain: Not on file   Food Insecurity: Not on file   Transportation Needs: Not on file   Physical Activity: Not on file   Stress: Not on file   Social Connections: Not on file   Intimate Partner Violence: Not on file   Depression: At risk (7/12/2023)    PHQ-2     PHQ-2 Score: 5   Housing Stability: Not on file     Functional Status:  Prior to admission patient needed assistance:    Dependent ADLs:: Independent  Dependent IADLs:: Independent     Mental Health Status:  No concerns identified        Chemical Dependency Status:  No concerns identified                Values/Beliefs:  Spiritual, Cultural Beliefs, [employer] Practices, Values that affect care:               Additional Information:  Met with patient to review role of care management, progression of care and possible need for services at discharge, including OP services, home care, or skilled nursing care.   Pt states she is independent with all ADL/IADLs, lives with spouse.  She states her daughterLeelee is supportive and available to assist if needed.  She plans to discharge home and does not feel need for any services.  DaughterLeelee will provide transport.  MOON paperwork provided.    Pt is going to have Lung Scan and then should be able to discharge home afterwards.    Catalina Paulino RN

## 2023-08-28 NOTE — PLAN OF CARE
Goal Outcome Evaluation:      Plan of Care Reviewed With: patient    Overall Patient Progress: improvingOverall Patient Progress: improving    Outcome Evaluation: VSS on baseline 3L O2 via NC. Denies pain, SOB. IV ABX infusing. NM lung scan today. Makes needs known. Plan to discharge home with daughter.

## 2023-08-29 PROBLEM — C34.11 MALIGNANT NEOPLASM OF UPPER LOBE OF RIGHT LUNG (H): Status: ACTIVE | Noted: 2023-01-01

## 2023-08-29 NOTE — PROGRESS NOTES
"Virtual Visit Details    Type of service:  Video Visit   Video Start Time: {video visit start/end time for provider to select:679145}  Video End Time:{video visit start/end time for provider to select:080298}    Originating Location (pt. Location): {video visit patient location:748724::\"Home\"}  {PROVIDER LOCATION On-site should be selected for visits conducted from your clinic location or adjoining [facility] hospital, academic office, or other nearby [facility] building. Off-site should be selected for all other provider locations, including home:834200}  Distant Location (provider location):  {virtual location provider:139110}  Platform used for Video Visit: {Virtual Visit Platforms:785336::\"Loom Decor\"}  "

## 2023-08-29 NOTE — TELEPHONE ENCOUNTER
Patient called regarding appointment for [facility] lab results. She was scheduled on 9/8 but does not want to wait this long. Per Dr. Suarez she could do a virtual appointment with patient at 3:15 today. Patient is agreeable and would like to take this appointment. She does not believe her daughter Leelee can be present but would still like to proceed.    Bianca Coates RN

## 2023-08-29 NOTE — PROGRESS NOTES
Clinic Care Coordination Contact  [facility]/Voicemail       Clinical Data: Care Coordinator Outreach  Outreach attempted x 1.  Left message on patient's voicemail.  Plan:  Care Coordinator will try to reach patient again in 1-2 business days.    TCM Episode created  UT x 1

## 2023-08-29 NOTE — PROGRESS NOTES
[facility] Progress Note    Patient: Irene León  MRN: [id number]  Date of Service: Aug 29, 2023     This is a video visit.  Video start time -3:59 PM  Video end time - 4:15 PM  Video platform used [employer] Being [employer]ched  Provider location-Onsite  Patient location - Home    Reason for Visit    Chief Complaint   Patient presents with    RECHECK     Video visit        Assessment and Plan     Cancer Staging   Invasive ductal carcinoma of breast, female, left (H)  Staging form: Breast, AJCC 8th Edition  - Pathologic stage from 6/20/2017: Stage IA (pT1b, pN0(sn), cM0, G1, ER+, ND+, HER2-, Oncotype DX score: 6) - Signed by Devon Suarez MD on 7/4/2022      ECOG Performance    3 - Confined to bed/chair > 50% of time, capable of limited self care     Pain  Pain Score: No Pain (0)      #.   Right upper lobe pulmonary lung cancer with adjacent satellite nodule and thoracic lymph node metastasis most consistent with locally advanced lung cancer.  There is no evidence of distant metastasis. KRAS G12C mutated.     I reviewed the [id number] result.  She has targetable mutation of KRAS G12C.  I also discussed about code mutation of Tp53.   She is not a candidate for systemic chemotherapy.  I offered sotorasib.  I reviewed the side effects, schedule.  I also discussed about potentially not effective therapy.   I advised her to follow-up labs and clinical exam about 3 weeks after starting sotorasib.      #.  History of invasive ductal carcinoma of the left breast.   Stage IA (pT1b, pN0 (sn),cM0). grade 1, associated DCIS, ER/ND positive, HER-2 negative, s/p left breast lumpectomy and left axillary sentinel lymph node biopsy. Right breast atypical ductal hyperplasia s/p right breast excisional biopsy on 6/20/2017.  She has several comorbidities including nonischemic cardiomyopathy (EF 40% in 3/17), osteoporosis on treatment with oral bisphosphonate, factor V Leiden mutation (details unknown). Started Tamoxifen  in 12/2017.  #.  Chronic hypoxic respiratory failure, on supplemental oxygen    Encounter Diagnoses:    Problem List Items Addressed This Visit          Oncology Diagnoses    Malignant neoplasm of upper lobe of right lung (H) - Primary    Relevant Medications    prochlorperazine (COMPAZINE) 10 MG tablet (Start on 8/31/2023)    Other Relevant Orders    Comprehensive metabolic panel            CC: Pankaj Montanez MD   ______________________________________________________________________________  Diagnosis  6/20/17  Stage IA (pT1b, pN0(sn), cM0) invasive ductal carcinoma of the left breast  - ER (high positive, 98%), [place] (high positive, 97%) HER2 (negative, score of 1+ by IHC)  - Leena grade 1  - Tumor size: <1 cm  - Margin-negative on final margin with reexcision for invasive carcinoma but close margin for DCIS of 0.2 cm from new medial margin.    - 1 sentinel lymph node removed, negative for carcinoma  - associated DCIS  - Right breast atypical ductal hyperplasia.    - Oncotype DX recurrence score 6 : 10 year risk of recurrence with 5 year of tamoxifen is 5%.    Therapy to date:  6/20/17 -right breast excisional biopsy AND left breast lumpectomy, left axillary sentinel lymph node biopsy  6/30/17-reexcision lumpectomy of the left breast  9/8/17-completed adjuvant radiation to the left breast 4256 cGy/16  12/6/17-8/11/2023- adjuvant endocrine therapy with tamoxifen.    8/2023-right upper lobe lung cancer with adjacent satellite nodules and thoracic lymph node metastasis   It was diagnosed after a progressively enlarging right upper lobe lung mass with satellite nodules.  Due to her severe COPD/emphysema, chronic hypoxic respiratory failure and increased risk of complications, lung or mediastinal lymph node biopsy was unable to obtain.  They are no other alternative site for biopsy.    Radiation is absolutely contraindicated with her pulmonary condition.  Her current performance status is not adequate for  chemotherapy either.  She has strong desire to look into potential treatment option with the goal of palliation including prolonging life expectancy. [id number] test was completed.  It showed KRAS G12C, TP53 mutations.     Comorbidities  #.  Mild hypoxia and chronic cough.   She is closely follow with pulmonologist at [facility].    #. Hypertension, controlled.  #.  Osteoporosis  #.  COPD  #.  Idiopathic cardiomyopathy- LVEF 40% in October 2018, no change from prior.      History of Present Illness    Ms. Irene León presented today accompanied by her daughter for evaluation of right upper lung mass.  She was in the hospital a few days ago with acute on chronic hypoxic respiratory failure and COPD exacerbation.  She was discharged home yesterday.  She reported that she is feeling good today.  She is here to review [id number] result and to discuss about next step in management.    Review of systems  Apart from describing in HPI, the remainder of comprehensive ROS was negative.    Past History    Past Medical History:   Diagnosis Date    ANATOLIY (acute kidney injury) (H)     Allergic rhinitis     Arrhythmia     Atrial fibrillation with RVR (H)     Bacteremia     Breast cancer (H) 2017    Cardiomyopathy (H)     Centrilobular emphysema (H)     CHF (congestive heart failure) (H)     CKD (chronic kidney disease)     COPD (chronic obstructive pulmonary disease) (H)     Coronary artery disease     Depression     Dysphagia     E. coli sepsis (H)     Factor 5 Leiden mutation, heterozygous (H)     GERD (gastroesophageal reflux disease)     Heart failure with reduced ejection fraction (H) 4/17/2023    Hx of radiation therapy 2017    Hyperlipidemia     Hypertension     Hypokalemia     Hypomagnesemia     Idiopathic cardiomyopathy (H)     Left hip pain 4/30/2014    OAB (overactive bladder)     Osteoporosis     Sacral insufficiency fracture     Sinusitis     Syncope     Urge incontinence     Vocal cord dysfunction        Past  "Surgical History:   Procedure Laterality Date    ARTHROPLASTY REVISION HIP Left     BIOPSY BREAST Right 2017    BLADDER SURGERY      bladder interstim with removal    CARDIAC CATHETERIZATION      CATARACT EXTRACTION Left 07/18/2017    IR ABDOMINAL AORTOGRAM  4/16/2003    IR AORTIC ARCH 4 VESSEL ANGIOGRAM  4/16/2003    IR MISCELLANEOUS PROCEDURE  4/16/2003    LUMPECTOMY BREAST Left 06/2017    x2    PICC DOUBLE LUMEN PLACEMENT  4/11/2022         PICC TRIPLE LUMEN PLACEMENT  4/7/2022         ZZC OPEN TX FEMORAL FRACTURE DISTAL MED/LAT CONDYLE Left 10/28/2015    Procedure: OPEN REDUCTION INTERNAL FIXATION LEFT  PROXIMAL FEMUR PERIPROSTHETIC FRACTURE;  Surgeon: Yovanny Albarran MD;  Location: [facility];  Service: Orthopedics       Physical Exam    Ht 1.499 m (4' 11\")   Wt 48.5 kg (107 lb)   BMI 21.61 kg/m      General: alert, awake, not in acute distress.  Very thin and cachectic.    She wears supplemental oxygen.  Reportedly she uses 3 L of oxygen continuously.  Lungs are clear to auscultation bilaterally with diminished.  CNS: non focal.      Lab Results    Recent Results (from the past 168 hour(s))   ECG 12-LEAD WITH MUSE ([facility])   Result Value Ref Range    Systolic Blood Pressure 201 mmHg    Diastolic Blood Pressure 85 mmHg    Ventricular Rate 100 BPM    Atrial Rate 100 BPM    MA Interval 120 ms    QRS Duration 124 ms     ms    QTc 490 ms    P Axis 73 degrees    R AXIS -72 degrees    T Axis 114 degrees    Interpretation ECG       Sinus rhythm with Premature supraventricular complexes  Left axis deviation  Non-specific intra-ventricular conduction delay  ST & T wave abnormality, consider lateral ischemia  Abnormal ECG  When compared with ECG of 03-JUL-2023 10:58,  Premature supraventricular complexes are now Present  Vent. rate has increased BY  37 BPM  Confirmed by SEE ED PROVIDER NOTE FOR, ECG INTERPRETATION ([id number]),  VEL NAIR ([id number]) on 8/27/2023 2:53:16 AM     Basic metabolic panel "   Result Value Ref Range    Sodium 143 136 - 145 mmol/L    Potassium 3.8 3.5 - 5.0 mmol/L    Chloride 107 98 - 107 mmol/L    Carbon Dioxide (CO2) 26 22 - 31 mmol/L    Anion Gap 10 5 - 18 mmol/L    Urea Nitrogen 22 8 - 28 mg/dL    Creatinine 1.81 (H) 0.60 - 1.10 mg/dL    Calcium 9.3 8.5 - 10.5 mg/dL    Glucose 102 70 - 125 mg/dL    GFR Estimate 28 (L) >60 mL/min/1.73m2   Troponin I (now)   Result Value Ref Range    Troponin I 0.07 0.00 - 0.29 ng/mL   B-Type Natriuretic Peptide ( [facility] Only)   Result Value Ref Range     (H) 0 - 147 pg/mL   INR   Result Value Ref Range    INR 1.05 0.85 - 1.15   Symptomatic Influenza A/B, RSV, & SARS-CoV2 PCR (COVID-19) Nasopharyngeal    Specimen: Nasopharyngeal; Swab   Result Value Ref Range    Influenza A PCR Negative Negative    Influenza B PCR Negative Negative    RSV PCR Negative Negative    SARS CoV2 PCR Negative Negative   CBC with platelets and differential   Result Value Ref Range    WBC Count 8.3 4.0 - 11.0 10e3/uL    RBC Count 3.85 3.80 - 5.20 10e6/uL    Hemoglobin 11.0 (L) 11.7 - 15.7 g/dL    Hematocrit 35.5 35.0 - 47.0 %    MCV 92 78 - 100 fL    MCH 28.6 26.5 - 33.0 pg    MCHC 31.0 (L) 31.5 - 36.5 g/dL    RDW 14.1 10.0 - 15.0 %    Platelet Count 209 150 - 450 10e3/uL    % Neutrophils 67 %    % Lymphocytes 18 %    % Monocytes 11 %    % Eosinophils 3 %    % Basophils 1 %    % Immature Granulocytes 0 %    NRBCs per 100 WBC 0 <1 /100    Absolute Neutrophils 5.5 1.6 - 8.3 10e3/uL    Absolute Lymphocytes 1.5 0.8 - 5.3 10e3/uL    Absolute Monocytes 0.9 0.0 - 1.3 10e3/uL    Absolute Eosinophils 0.3 0.0 - 0.7 10e3/uL    Absolute Basophils 0.1 0.0 - 0.2 10e3/uL    Absolute Immature Granulocytes 0.0 <=0.4 10e3/uL    Absolute NRBCs 0.0 10e3/uL   Comprehensive metabolic panel   Result Value Ref Range    Sodium 143 136 - 145 mmol/L    Potassium 3.7 3.5 - 5.0 mmol/L    Chloride 105 98 - 107 mmol/L    Carbon Dioxide (CO2) 26 22 - 31 mmol/L    Anion Gap 12 5 - 18 mmol/L    Urea  Nitrogen 20 8 - 28 mg/dL    Creatinine 1.81 (H) 0.60 - 1.10 mg/dL    Calcium 9.3 8.5 - 10.5 mg/dL    Glucose 143 (H) 70 - 125 mg/dL    Alkaline Phosphatase 57 45 - 120 U/L    AST 18 0 - 40 U/L    ALT <9 0 - 45 U/L    Protein Total 7.5 6.0 - 8.0 g/dL    Albumin 3.1 (L) 3.5 - 5.0 g/dL    Bilirubin Total 0.4 0.0 - 1.0 mg/dL    GFR Estimate 28 (L) >60 mL/min/1.73m2   Troponin I   Result Value Ref Range    Troponin I 0.07 0.00 - 0.29 ng/mL   Blood gas venous   Result Value Ref Range    pH Venous 7.34 (L) 7.35 - 7.45    pCO2 Venous 54 (H) 35 - 50 mm Hg    pO2 Venous 29 25 - 47 mm Hg    Bicarbonate Venous 29 24 - 30 mmol/L    Base Excess/Deficit 3.4   mmol/L    Oxyhemoglobin Venous 48.3 (L) 70.0 - 75.0 %    O2 Sat, Venous 49.1 (L) 70.0 - 75.0 %   CBC with platelets and differential   Result Value Ref Range    WBC Count 8.1 4.0 - 11.0 10e3/uL    RBC Count 4.08 3.80 - 5.20 10e6/uL    Hemoglobin 11.8 11.7 - 15.7 g/dL    Hematocrit 37.3 35.0 - 47.0 %    MCV 91 78 - 100 fL    MCH 28.9 26.5 - 33.0 pg    MCHC 31.6 31.5 - 36.5 g/dL    RDW 13.9 10.0 - 15.0 %    Platelet Count 212 150 - 450 10e3/uL    % Neutrophils 95 %    % Lymphocytes 4 %    % Monocytes 1 %    % Eosinophils 0 %    % Basophils 0 %    % Immature Granulocytes 0 %    NRBCs per 100 WBC 0 <1 /100    Absolute Neutrophils 7.7 1.6 - 8.3 10e3/uL    Absolute Lymphocytes 0.3 (L) 0.8 - 5.3 10e3/uL    Absolute Monocytes 0.1 0.0 - 1.3 10e3/uL    Absolute Eosinophils 0.0 0.0 - 0.7 10e3/uL    Absolute Basophils 0.0 0.0 - 0.2 10e3/uL    Absolute Immature Granulocytes 0.0 <=0.4 10e3/uL    Absolute NRBCs 0.0 10e3/uL   B-Type Natriuretic Peptide ([facility] Only)   Result Value Ref Range     (H) 0 - 147 pg/mL   Procalcitonin   Result Value Ref Range    Procalcitonin 0.04 0.00 - 0.49 ng/mL   Basic metabolic panel   Result Value Ref Range    Sodium 146 (H) 136 - 145 mmol/L    Potassium 4.4 3.5 - 5.0 mmol/L    Chloride 105 98 - 107 mmol/L    Carbon Dioxide (CO2) 30 22 - 31 mmol/L     Anion Gap 11 5 - 18 mmol/L    Urea Nitrogen 21 8 - 28 mg/dL    Creatinine 1.67 (H) 0.60 - 1.10 mg/dL    Calcium 8.6 8.5 - 10.5 mg/dL    Glucose 127 (H) 70 - 125 mg/dL    GFR Estimate 31 (L) >60 mL/min/1.73m2   Extra Purple Top Tube   Result Value Ref Range    Hold Specimen JIC          Imaging    Lung perfusion scan (NM)    Result Date: 8/28/2023  EXAM: NM LUNG SCAN PERFUSION PARTICULATE LOCATION: [facility] DATE: 8/28/2023 INDICATION: Shortness of breath COMPARISON: Chest x-ray dated 08/28/2023 TECHNIQUE: 8.5 mCi technetium-99m MAA, IV. Standard lung perfusion imaging. FINDINGS: Normal pulmonary perfusion without segmental defect.     IMPRESSION: No evidence of pulmonary embolism.    XR Chest 2 Views    Result Date: 8/28/2023  EXAM: XR CHEST 2 VIEWS LOCATION: [facility] DATE: 8/28/2023 INDICATION: SOB, Hypoxia COMPARISON: 08/27/2023     IMPRESSION: Stable chest without new focal airspace opacity. Unchanged nodules in the right midlung and lung emphysematous changes. No pleural effusion or pneumothorax. Stable cardiomediastinal silhouette.    US Lower Extremity Venous Duplex Bilateral    Result Date: 8/27/2023  EXAM: ULTRASOUND LOWER EXTREMITY VENOUS DUPLEX BILATERAL LOCATION: [facility] DATE: 08/27/2023 INDICATION: Shortness of breath, hypoxia, unable to get CT scan due to kidney function. History of lung cancer with acute on chronic hypoxic respiratory failure.  Bilateral lower extremity swelling noted. COMPARISON: None. TECHNIQUE: Venous Duplex ultrasound of bilateral lower extremities with and without compression, augmentation and duplex. Color flow and spectral Doppler with waveform analysis performed. FINDINGS: Exam includes the common femoral, femoral, popliteal veins as well as segmentally visualized deep calf veins and greater saphenous vein. RIGHT: No deep vein thrombosis. No superficial thrombophlebitis. No popliteal cyst. LEFT:  No deep vein thrombosis. No superficial thrombophlebitis. No popliteal cyst. Small anechoic cystic area in the mid left thigh, of indeterminate etiology     IMPRESSION: 1.  No deep venous thrombosis in the bilateral lower extremities. 2.  Small anechoic cystic area in the mid left thigh, of indeterminate etiology.     XR Chest Port 1 View    Result Date: 8/27/2023  EXAM: XR CHEST PORT 1 VIEW LOCATION: [facility] DATE: 8/27/2023 INDICATION: sob COMPARISON: Chest x-ray 07/03/2023 and CT chest, abdomen and pelvis 08/11/2023     IMPRESSION: Heart size and pulmonary vascularity normal. Emphysema. Left diaphragmatic eventration accounting for the density in the left lung base. Subsegmental atelectasis or scarring both lower lobes. 2.5 cm mass mid right lung as seen on CT. No new infiltrates or effusions.    PET Oncology Whole Body    Result Date: 8/11/2023  EXAM: PET ONCOLOGY WHOLE BODY, CT CHEST ABDOMEN PELVIS W/O CONTRAST LOCATION: [facility] DATE: 8/11/2023 INDICATION: Other nonspecific abnormal finding of lung field. COMPARISON: CT chest 07/31/2023. CONTRAST: None. TECHNIQUE: Serum glucose level 97 mg/dL. One hour post intravenous administration of 10.16 mCi F-18 FDG, PET imaging was performed from the skull vertex to feet, utilizing attenuation correction with concurrent axial CT and PET/CT image fusion. Separate diagnostic CT of the chest, abdomen, and pelvis was performed. Dose reduction techniques were used. PET/CT FINDINGS: FDG avid 2.3 x 2.1 cm pulmonary nodule in the peripheral right upper lobe abutting the lateral pleura (SUV max 6.2) with adjacent satellite nodule just superior to it measuring 1.2 x 0.6 cm (SUV max 5.5), suspicious for primary lung neoplasm. There are FDG avid left station 10 L/7 (SUV max 8.7), station 7 (SUV max 3.9), right lower paratracheal station 4R (SUV max 4.3), right upper paratracheal station 2R (SUV max 4.6), and prevascular station  3A (SUV max 4.4) lymph nodes, suspicious for metastases. There are additional mildly FDG avid patchy opacities throughout the bilateral lung fields and nodularity along the right minor fissure (SUV max 1.6), which remain indeterminate and could be inflammatory or an additional sites of early neoplasm. FDG avid  reticular change in the periphery of the lungs, typical of interstitial lung disease/inflammation. CT FINDINGS: Moderate senescent intracranial changes. Mucosal thickening in the paranasal sinuses. Moderate carotid artery bifurcation calcification. Upper lobe predominant emphysema. Moderate coronary artery calcification. Scattered atherosclerotic calcifications. Pelvic phleboliths. Scattered colonic diverticuli. Left hip arthroplasty. Chronic compression deformity of T12. Multilevel degenerative changes of the spine.     IMPRESSION: 1. Findings suspicious for right upper lobe primary lung neoplasm with adjacent satellite nodule and thoracic lymph node metastases. 2. Additional scattered mildly FDG avid patchy opacities in bilateral lung fields and nodularity along the right major fissure, which are indeterminant and warrants continued imaging surveillance.    CT Chest Abdomen Pelvis w/o Contrast    Result Date: 8/11/2023  EXAM: PET ONCOLOGY WHOLE BODY, CT CHEST ABDOMEN PELVIS W/O CONTRAST LOCATION: [facility] DATE: 8/11/2023 INDICATION: Other nonspecific abnormal finding of lung field. COMPARISON: CT chest 07/31/2023. CONTRAST: None. TECHNIQUE: Serum glucose level 97 mg/dL. One hour post intravenous administration of 10.16 mCi F-18 FDG, PET imaging was performed from the skull vertex to feet, utilizing attenuation correction with concurrent axial CT and PET/CT image fusion. Separate diagnostic CT of the chest, abdomen, and pelvis was performed. Dose reduction techniques were used. PET/CT FINDINGS: FDG avid 2.3 x 2.1 cm pulmonary nodule in the peripheral right upper lobe abutting the  lateral pleura (SUV max 6.2) with adjacent satellite nodule just superior to it measuring 1.2 x 0.6 cm (SUV max 5.5), suspicious for primary lung neoplasm. There are FDG avid left station 10 L/7 (SUV max 8.7), station 7 (SUV max 3.9), right lower paratracheal station 4R (SUV max 4.3), right upper paratracheal station 2R (SUV max 4.6), and prevascular station 3A (SUV max 4.4) lymph nodes, suspicious for metastases. There are additional mildly FDG avid patchy opacities throughout the bilateral lung fields and nodularity along the right minor fissure (SUV max 1.6), which remain indeterminate and could be inflammatory or an additional sites of early neoplasm. FDG avid  reticular change in the periphery of the lungs, typical of interstitial lung disease/inflammation. CT FINDINGS: Moderate senescent intracranial changes. Mucosal thickening in the paranasal sinuses. Moderate carotid artery bifurcation calcification. Upper lobe predominant emphysema. Moderate coronary artery calcification. Scattered atherosclerotic calcifications. Pelvic phleboliths. Scattered colonic diverticuli. Left hip arthroplasty. Chronic compression deformity of T12. Multilevel degenerative changes of the spine.     IMPRESSION: 1. Findings suspicious for right upper lobe primary lung neoplasm with adjacent satellite nodule and thoracic lymph node metastases. 2. Additional scattered mildly FDG avid patchy opacities in bilateral lung fields and nodularity along the right major fissure, which are indeterminant and warrants continued imaging surveillance.    CT CHEST WO    Result Date: 7/31/2023  For Patients: As a result of the 21st Century Cures Act, medical imaging exams and procedure reports are released immediately into your electronic medical record. You may view this report before your referring provider. If you have questions, please contact your health care provider. EXAM: CT CHEST WO LOCATION: [facility] DATE: 7/31/2023 INDICATION:  Lung Nodule COMPARISON: 04/10/2023 TECHNIQUE: CT chest without IV contrast. Multiplanar reformats were obtained. Dose reduction techniques were used. CONTRAST: None. FINDINGS: LUNGS AND PLEURA: Severe emphysema. Previously seen right upper lobe 1 cm nodule has increased in size and now measures 2.1 cm. Superior to this, there is an additional nodule measuring 1.3 cm. There are patchy bibasilar opacities. Somewhat irregular appearing nodules are seen along the right minor fissure measuring up to 0.9 cm on images 187 and 195. Similar scarring and bronchiectasis in the left upper lobe. MEDIASTINUM/AXILLAE: Prominent mediastinal lymph nodes are again seen, such as a 1 cm precarinal lymph node on image 60 as well as similar prominent upper paratracheal lymph nodes that to 0.8 cm lymph node on image 34. CORONARY ARTERY CALCIFICATION: Severe. UPPER ABDOMEN: Infrarenal abdominal aortic aneurysm measuring 3 cm. Renal cysts which do not need follow-up. MUSCULOSKELETAL: Bilateral rib fractures. T12 compression fracture is again seen.    1.  Findings most compatible with malignancy with interval increased size of the previously seen right upper lobe nodule now measuring 2.1 cm with a new adjacent nodule measuring 1.3 cm. Additional somewhat irregular appearing nodules are seen along the right minor fissure measuring up to 0.9 cm. PET/CT is recommended. 2.  Prominent mediastinal lymph nodes. 3.  Infrarenal abdominal aortic aneurysm measuring 3 cm.     40 minutes spent on the date of the encounter doing chart review, history and exam, documentation, communication of the treatment plan with the care team and further activities as noted above.    Signed by: Devon Suarez MD

## 2023-08-29 NOTE — NURSING NOTE
Is the patient currently in the state of [place]? YES    Visit mode:VIDEO    If the visit is dropped, the patient can be reconnected by: TELEPHONE VISIT: Phone number: [phone]    Will anyone else be joining the visit? Yes, daughter Leelee is there with her (If patient encounters technical issues they should call [phone] :[phone])    How would you like to obtain your AVS? Mail a copy    Are changes needed to the allergy or medication list? No, Pt and daughter indicted no changes to meds.    Reason for visit: RECHECK (Video visit )    Ashly BEE

## 2023-08-30 NOTE — TELEPHONE ENCOUNTER
PA Initiation    Medication: LUMAKRAS 120 MG PO TABS  Insurance Company:  - Phone [phone] Fax [phone]  Pharmacy Filling the Rx:    Filling Pharmacy Phone:    Filling Pharmacy Fax:    Start Date: 8/30/2023

## 2023-08-30 NOTE — TELEPHONE ENCOUNTER
Received call from [place] [address] Dr. Elizabeth, asking if she can forward information on this patient regarding clinical trials. Patient is KRAS positive, sotorasib is ordered.  Trials are not indicated for first line treatment.   She will forward information noted in results to Dr. Suarez via felecia.   Rose Marie Davies RN

## 2023-08-30 NOTE — PROGRESS NOTES
Clinic Care Coordination Contact  [facility]: Post-Discharge Note  SITUATION                                                      Admission:    Admission Date: 08/27/23   Reason for Admission: Shortness of Breath  Discharge:   Discharge Date: 08/28/23  Discharge Diagnosis: Acute on chronic hypoxic respiratory failure  Acute on chronic heart failure with reduced ejection fraction  COPD with acute exacerbation  Essential hypertension  Lung cancer  Chronic kidney disease stage IV  Anemia chronic kidney disease    BACKGROUND                                                      Per hospital discharge summary and inpatient provider notes:  HPI: 78-year-old female with a history of emphysema, COPD, asthma, atrial fibrillation, heart failure with reduced ejection fraction, chronic kidney disease, and hypertension presented to the ER with hypoxia and shortness of breath.  In the ER she was given Lasix DuoNeb steroids and admitted to the hospital.     Acute on chronic hypoxic respiratory failure/acute on chronic heart failure with reduced ejection fraction  Patient was admitted and diuresed with the Lasix given in the ER.  By the time I see her she is back to baseline essentially.  She was monitored overnight and did well on her home rate of nasal cannula O2.  Ultimately felt that it was dietary indiscretion with increased salt intake and lack of taking oral Lasix.  Cardiology saw the patient and agreed with discharging back to home on her prehospital regimen.  The importance of taking her medications and maintaining salt restriction reiterated multiple times.    ASSESSMENT           Discharge Assessment  How are you doing now that you are home?: spoke with Stephanie.  She stated she is feeling much better with the new medications.  Breathing much easier.  Had an appointment yesterday with Oncology.  How are your symptoms? (Red Flag symptoms escalate to triage hotline per guidelines): Improved  Does the patient have their  discharge instructions? : Yes  Does the patient have questions regarding their discharge instructions? : No  Were you started on any new medications or were there changes to any of your previous medications? : Yes  Does the patient have all of their medications?: Yes  Do you have questions regarding any of your medications? : No  Do you have all of your needed medical supplies or equipment (DME)?  (i.e. oxygen tank, CPAP, cane, etc.): Yes  Discharge follow-up appointment scheduled within 14 calendar days? : Yes  Discharge Follow Up Appointment Date: 09/06/23  Discharge Follow Up Appointment Scheduled with?: Primary Care Provider    Post-op ([facility] Only)  If the patient had a surgery or procedure, do they have any questions for a nurse?: No    Post-op (Clinicians Only)  Did the patient have surgery or a procedure: No  Fever: No  Chills: No  Eating & Drinking: eating and drinking without complaints/concerns  PO Intake: regular diet  Bowel Function: normal  Urinary Status: voiding without complaint/concerns    Spoke with Stephanie.  See note above.  She confirmed that she is 'feeling much better' with the change in medications.  Reviewed appointments listed below.  Instructed for her to call the clinic for any additional questions.  She declined CCC or any needs.    PLAN                                                      Outpatient Plan:  Patient to attend the appointments as listed below.    Future Appointments   Date Time Provider Department Center   9/6/2023 11:20 AM Pankaj Montanez MD [facility]   9/11/2023  2:50 PM Samantha Herrera APRN [facility]   9/18/2023  1:40 PM Tobi Miguel MD [facility]   9/28/2023  2:30 PM [facility] INFUSION CLIN[facility] LAB [facility]   9/28/2023  3:00 PM Devon Suarez MD [facility]         For any urgent concerns, please contact our 24 hour nurse triage line: [phone] ([phone])         Anushka Morales RN

## 2023-08-30 NOTE — TELEPHONE ENCOUNTER
Prior Authorization Approval    Medication: LUMAKRAS 120 MG PO TABS  Authorization Effective Date: 7/31/2023  Authorization Expiration Date: 12/31/1999  Approved Dose/Quantity: 240/30  Reference #: [id number]   Insurance Company: [employer] [phone] Fax [phone]  Expected CoPay:     CoPay Card Available:      Financial Assistance Needed:   Which Pharmacy is filling the prescription:    Pharmacy Notified: Yes  Patient Notified: Yes     Approval letter has not been obtained at this time.

## 2023-08-31 NOTE — ORAL ONC MGMT
Oral Chemotherapy Monitoring Program    Lab Monitoring Plan  Per treatment plan  Labs drawn outside of [place]: no   Subjective/Objective:  Irene León is a 78 year old female contacted by phone for an initial visit for oral chemotherapy education. Stephanie will be starting sotorasib 960 mg (8 pills) once daily. She can get this filled at [facility]. Would recommend to try to avoid meds that can decrease stomach acid, such as famotidine.She wasn't 100% sure she is taking this, but will double check today. If she does need famotidine, discussed needing to separate sotorasib by 4 hours before or 10 hours after taking the antacid. If she takes at bedtime closer to 10pm-midnight, then she can take the Lumakras at 11am as planned and this would work for the timeline. Also, let her know prochlorperazine is at her local pharmacy. She does have loperamide on hand already. She has baseline lung conditions, so we discussed monitoring for rare lung toxicity more specifically. She also had great questions about clinical trials that I answered to my best ability and recommended she speak with Dr. Suarez about these as well.        8/31/2023    11:00 AM   ORAL CHEMOTHERAPY   Assessment Type Initial Work up;New Teach   Diagnosis Code Non-Small Cell Lung Cancer   Providers Dr. Suarez   Clinic Name/Location [place]   Drug Name Lumakras (sotorasib)   Dose 960 mg   Current Schedule Daily   Cycle Details Continuous   Any new drug interactions? Yes   Pharmacist Intervention? Yes   Intervention(s) Patient Education   Is the dose as ordered appropriate for the patient? Yes       Last PHQ-2 Score on record:       6/30/2022    11:24 AM 2/14/2022    10:52 AM   PHQ-2 ( 1999 Pf[employer])   Q1: Little interest or pleasure in doing things 0 0   Q2: Feeling down, depressed or hopeless 0 0   PHQ-2 Score 0 0       Vitals:  BP:   BP Readings from Last 1 Encounters:   08/28/23 106/68     Wt Readings from Last 1 Encounters:   08/29/23 48.5 kg (107 lb)  "    Estimated body surface area is 1.42 meters squared as calculated from the following:    Height as of 8/29/23: 1.499 m (4' 11\").    Weight as of 8/29/23: 48.5 kg (107 lb).    Labs:  _  Result Component Current Result Ref Range   Sodium 146 (H) (8/28/2023) 136 - 145 mmol/L     _  Result Component Current Result Ref Range   Potassium 4.4 (8/28/2023) 3.5 - 5.0 mmol/L     _  Result Component Current Result Ref Range   Calcium 8.6 (8/28/2023) 8.5 - 10.5 mg/dL     No results found for Mag within last 30 days.     No results found for Phos within last 30 days.     _  Result Component Current Result Ref Range   Albumin 3.1 (L) (8/27/2023) 3.5 - 5.0 g/dL     _  Result Component Current Result Ref Range   Urea Nitrogen 21 (8/28/2023) 8 - 28 mg/dL     _  Result Component Current Result Ref Range   Creatinine 1.67 (H) (8/28/2023) 0.60 - 1.10 mg/dL     _  Result Component Current Result Ref Range   AST 18 (8/27/2023) 0 - 40 U/L     _  Result Component Current Result Ref Range   ALT <9 (8/27/2023) 0 - 45 U/L     _  Result Component Current Result Ref Range   Bilirubin Total 0.4 (8/27/2023) 0.0 - 1.0 mg/dL     _  Result Component Current Result Ref Range   WBC Count 8.1 (8/27/2023) 4.0 - 11.0 10e3/uL     _  Result Component Current Result Ref Range   Hemoglobin 11.8 (8/27/2023) 11.7 - 15.7 g/dL     _  Result Component Current Result Ref Range   Platelet Count 212 (8/27/2023) 150 - 450 10e3/uL     No results found for ANC within last 30 days.     _  Result Component Current Result Ref Range   Absolute Neutrophils 7.7 (8/27/2023) 1.6 - 8.3 10e3/uL      Assessment:  Patient is appropriate to start therapy.    Plan:  Basic chemotherapy teaching was reviewed with the patient including indication, start date of therapy, dose, administration, adverse effects, missed doses, food and drug interactions, monitoring, side effect management, office contact information, and safe handling. Written materials were provided and all questions " answered.    Follow-Up:  Will plan to call her one week from when she starts and then review next provider visit. Encouraged her to reach out if needed before then.     Sarai Porras, PharmD, [facility]  Oral Chemotherapy Pharmacist  [phone]

## 2023-09-06 NOTE — PROGRESS NOTES
[facility]: Cancer Care                                  Invasive ductal carcinoma of breast, female, left   Right upper lobe pulmonary lung cancer with adjacent satellite nodule and thoracic lymph node metastasis most consistent with locally advanced lung cancer.                                                         Patient called to report diarrhea starting this AM.   She started sotorasib 120 mg tabs take 8 tabs every day. She started medication but is unsure on day; may have been 9/01 or 9/02.  Reports three episodes this am of diarrhea. Watery. Stool is causing irritation to skin. She notes scant bleeding with wiping.   Denies fever, no N/V, no abdominal cramping. Appetite WNL. Urinating.   No lightheadedness, dizziness. She has had approximately 16 oz of water today.     Education provided on hydration and monitoring. Encouraged patient to call with ANY increase in stool frequency as we may have to modify sotorasib.    Also gave Homecare Instructions     May use loperamide (Imodium). Follow package instructions.Advised she can use imodium according to directions: take imodium 2 mg two tabs after next watery stool, followed by imodium one tab after each next watery stool to total no more than 8 tabs in 24 hours.      Avoid fried, fatty, greasy, and spice foods     Avoid high fiber foods (raw fruits and vegetables, skins, seeds, legumes)     Avoid caffeine, alcohol, dairy, sucrose, and sorbitol products     Apply skin barrier (e.g., zinc oxide) to protect rectum from irritation. Also use gentle cleansing wipes. May need to use sitz bath.    If on targeted/oral chemotherapy, consider consultation with oral chemotherapy pharmacist Document and route to MD/, LENA, and RNCC      Signature:  Rose Marie Davies RN

## 2023-09-07 NOTE — ORAL ONC MGMT
Oral Chemotherapy Monitoring Program    Subjective/Objective:  Irene León is a 78 year old female contacted by phone for a follow-up visit for oral chemotherapy.  Stephanie says she is doing better today than yesterday. Had 5 episodes of loose water diarrhea yesterday, with the last one around 8p (after which she took her last/most recent dose of lopermaide as well). Drank about 32 oz of water yesterday, but denies feeling light headed or dizzy today and reports she is peeing regularly. Confirmed she is taking the sotorasib (8 tabs) daily and usually takes with breakfast, but today took a couple hours later on an empty stomach. Denies any nausea, vomiting, myalgias or arthralgias.         8/31/2023    11:00 AM 9/7/2023    11:00 AM   ORAL CHEMOTHERAPY   Assessment Type Initial Work up;New Teach Initial Follow up   Diagnosis Code Non-Small Cell Lung Cancer Non-Small Cell Lung Cancer   Providers Dr. Daniela Suarez   Clinic Name/Location [facility]   Drug Name Lumakras (sotorasib) Lumakras (sotorasib)   Dose 960 mg 960 mg   Current Schedule Daily Daily   Cycle Details Continuous Continuous   Start Date of Last Cycle  9/1/2023   Doses missed in last 2 weeks  0   Adherence Assessment  Adherent   Adverse Effects  Diarrhea   Diarrhea  Grade 2   Pharmacist Intervention(diarrhea)  Yes   Intervention(s)  Patient education;OTC recommendation   Any new drug interactions? Yes No   Pharmacist Intervention? Yes    Intervention(s) Patient Education    Is the dose as ordered appropriate for the patient? Yes Yes       Last PHQ-2 Score on record:       6/30/2022    11:24 AM 2/14/2022    10:52 AM   PHQ-2 ( 1999 Pfizer)   Q1: Little interest or pleasure in doing things 0 0   Q2: Feeling down, depressed or hopeless 0 0   PHQ-2 Score 0 0       Vitals:  BP:   BP Readings from Last 1 Encounters:   08/28/23 106/68     Wt Readings from Last 1 Encounters:   08/29/23 48.5 kg (107 lb)     Estimated body surface area is 1.42 meters  "squared as calculated from the following:    Height as of 8/29/23: 1.499 m (4' 11\").    Weight as of 8/29/23: 48.5 kg (107 lb).    Labs:  _  Result Component Current Result Ref Range   Sodium 146 (H) (8/28/2023) 136 - 145 mmol/L     _  Result Component Current Result Ref Range   Potassium 4.4 (8/28/2023) 3.5 - 5.0 mmol/L     _  Result Component Current Result Ref Range   Calcium 8.6 (8/28/2023) 8.5 - 10.5 mg/dL     No results found for Mag within last 30 days.     No results found for Phos within last 30 days.     _  Result Component Current Result Ref Range   Albumin 3.1 (L) (8/27/2023) 3.5 - 5.0 g/dL     _  Result Component Current Result Ref Range   Urea Nitrogen 21 (8/28/2023) 8 - 28 mg/dL     _  Result Component Current Result Ref Range   Creatinine 1.67 (H) (8/28/2023) 0.60 - 1.10 mg/dL     _  Result Component Current Result Ref Range   AST 18 (8/27/2023) 0 - 40 U/L     _  Result Component Current Result Ref Range   ALT <9 (8/27/2023) 0 - 45 U/L     _  Result Component Current Result Ref Range   Bilirubin Total 0.4 (8/27/2023) 0.0 - 1.0 mg/dL     _  Result Component Current Result Ref Range   WBC Count 8.1 (8/27/2023) 4.0 - 11.0 10e3/uL     _  Result Component Current Result Ref Range   Hemoglobin 11.8 (8/27/2023) 11.7 - 15.7 g/dL     _  Result Component Current Result Ref Range   Platelet Count 212 (8/27/2023) 150 - 450 10e3/uL     No results found for ANC within last 30 days.     _  Result Component Current Result Ref Range   Absolute Neutrophils 7.7 (8/27/2023) 1.6 - 8.3 10e3/uL          Assessment/Plan:  Stephanie is tolerating the sotorasib alright with some grade 2 diarrhea yesterday. Reviewed how to take loperamide and importance of staying hydrated (goal of 64oz water/day) especially when having diarrhea. She agrees to try to drink more water and will call us if she has diarrhea again.  Continue as prescribed.    Follow-Up:  Will plan to another assessment call next week to check on her and then review " next appt on 9/21. Stephanie agrees to the plan and knows she can call us in the meantime if she has any questions or concerns.    Refill Due:  ~9/22    Emir Durant, PharmD, [facility]  Clinical Oncology Pharmacist  September 7, 2023

## 2023-09-11 NOTE — PROGRESS NOTES
Assessment/Recommendations   Assessment:    1.  heart failure with reduced ejection fraction, ejection fraction 20-25%, NYHA class III: Compensated.  She has had increased lower extremity edema and weight gain.  She states her weight increased 2 pounds today.  She took an extra 20 mg of Lasix.  She has not been following a low-sodium diet.  We discussed this in detail today.  2.  Hypertension: Controlled.  Blood pressure 102/54  3.  Severe COPD: She continues chronic oxygen.  She continues to meet with palliative care.  4.  CKD stage IIIb: She had labs a few weeks ago with sodium 146, potassium 4.4, BUN 21 and creatinine 1.67 which are stable    Plan:  1.  BMP pending  2.  Take an extra 20 mg of Lasix today.  Take Lasix 80 mg in the morning and 40 mg in the afternoon tomorrow  3.  Stressed importance of low-sodium diet  4.  Monitor weights daily    Irene León will follow up with Dr. Chilel in 3 months and in the heart failure clinic in 1 month.     History of Present Illness/Subjective    Ms. Irene León is a 78 year old female seen at [facility] heart failure clinic today for continued follow-up.  Her son accompanies her today.  She follows up for heart failure with reduced ejection fraction.  She was recently hospitalized August 27 to August 28 due to acute respiratory failure.  She had not been taking her Lasix and had increased her salt intake.  She received IV Lasix for 1 day and was back to her baseline.  Her last echocardiogram was done June 2023 which showed a reduced ejection fraction of 20 to 25%.  Dr. Chilel had recommended coronary angiogram at that time.  This has not been completed. She has a past medical history significant for hypertension, chronic kidney disease stage III, severe COPD on chronic oxygen, and history of breast cancer status post lumpectomy and radiation therapy.     Today, she has increased dyspnea on exertion and lower extremity edema.  She denies lightheadedness,  orthopnea, PND, palpitations, chest pain, and abdominal fullness/bloating.      She is monitoring home weights which increased 2 pounds today.  She does not follow a low-salt diet     Physical Examination Review of Systems   /54 (BP Location: Right arm, Patient Position: Sitting, Cuff Size: Adult Regular)   Pulse 69   Resp 20   Wt 47.2 kg (104 lb)   SpO2 98%   BMI 21.01 kg/m    Body mass index is 21.01 kg/m .  Wt Readings from Last 3 Encounters:   09/11/23 47.2 kg (104 lb)   08/29/23 48.5 kg (107 lb)   08/27/23 50 kg (110 lb 3.7 oz)       General Appearance:   no acute distress   ENT/Mouth: No abnormalities   EYES:  no scleral icterus, normal conjunctivae   Neck: no thyromegaly   Chest/Lungs:   lungs are decreased to auscultation, no rales or wheezing, equal chest wall expansion, wearing oxygen   Cardiovascular:   Regular. Normal first and second heart sounds with no murmurs, rubs, or gallops, moderate pedal edema bilaterally    Abdomen:  bowel sounds are present   Extremities: no cyanosis or clubbing   Skin: warm   Neurologic: no tremors     Psychiatric: alert and oriented x3                                              Medical History  Surgical History Family History Social History   Past Medical History:   Diagnosis Date    ANATOLIY (acute kidney injury) (H)     Allergic rhinitis     Arrhythmia     Atrial fibrillation with RVR (H)     Bacteremia     Breast cancer (H) 2017    Cardiomyopathy (H)     Centrilobular emphysema (H)     CHF (congestive heart failure) (H)     Chronic kidney disease     CKD (chronic kidney disease)     COPD (chronic obstructive pulmonary disease) (H)     Coronary artery disease     Depression     Dysphagia     E. coli sepsis (H)     Factor 5 Leiden mutation, heterozygous (H)     GERD (gastroesophageal reflux disease)     Heart failure with reduced ejection fraction (H) 04/17/2023    Hx of radiation therapy 2017    Hyperlipidemia     Hypertension     Hypokalemia     Hypomagnesemia      Idiopathic cardiomyopathy (H)     Left hip pain 04/30/2014    OAB (overactive bladder)     Osteoporosis     Sacral insufficiency fracture     Sinusitis     Syncope     Urge incontinence     Vocal cord dysfunction     Past Surgical History:   Procedure Laterality Date    ARTHROPLASTY REVISION HIP Left     BIOPSY BREAST Right 2017    BLADDER SURGERY      bladder interstim with removal    CARDIAC CATHETERIZATION      CATARACT EXTRACTION Left 07/18/2017    IR ABDOMINAL AORTOGRAM  4/16/2003    IR AORTIC ARCH 4 VESSEL ANGIOGRAM  4/16/2003    IR MISCELLANEOUS PROCEDURE  4/16/2003    LUMPECTOMY BREAST Left 06/2017    x2    PICC DOUBLE LUMEN PLACEMENT  4/11/2022         PICC TRIPLE LUMEN PLACEMENT  4/7/2022         ZZC OPEN TX FEMORAL FRACTURE DISTAL MED/LAT CONDYLE Left 10/28/2015    Procedure: OPEN REDUCTION INTERNAL FIXATION LEFT  PROXIMAL FEMUR PERIPROSTHETIC FRACTURE;  Surgeon: Yovanny Albarran MD;  Location: [facility] Main OR;  Service: Orthopedics    Family History   Problem Relation Age of Onset    Osteoporosis Other     Prostate Cancer Brother     Breast Cancer Maternal Aunt         age thought to be in her 70's-80's    Prostate Cancer Maternal Uncle     Social History     Socioeconomic History    Marital status:      Spouse name: Not on file    Number of children: Not on file    Years of education: Not on file    Highest education level: Not on file   Occupational History    Not on file   Tobacco Use    Smoking status: Former     Types: Cigarettes     Quit date: 10/28/2007     Years since quitting: 15.8    Smokeless tobacco: Former     Quit date: 9/6/2004   Vaping Use    Vaping Use: Former   Substance and Sexual Activity    Alcohol use: No    Drug use: No    Sexual activity: Not on file   Other Topics Concern    Not on file   Social History Narrative     and lives in home with 3 flight of steps     Social Determinants of Health     Financial Resource Strain: Not on file   Food Insecurity: Not on file    Transportation Needs: Not on file   Physical Activity: Not on file   Stress: Not on file   Social Connections: Not on file   Intimate Partner Violence: Not on file   Housing Stability: Not on file          Medications  Allergies   Current Outpatient Medications   Medication Sig Dispense Refill    acetaminophen (TYLENOL) 500 MG tablet Take 1,000 mg by mouth every 8 hours as needed for mild pain or fever       acetylcysteine (MUCOMYST) 10 % nebulizer solution Inhale 4 mLs into the lungs every 4 hours      albuterol (PROAIR HFA/PROVENTIL HFA/VENTOLIN HFA) 108 (90 Base) MCG/ACT inhaler Inhale 2 puffs into the lungs every 6 hours as needed for shortness of breath, wheezing or cough      Azelastine HCl 137 MCG/SPRAY SOLN Spray 1 spray into both nostrils 2 times daily as needed for rhinitis      benzonatate (TESSALON PERLES) 100 MG capsule Take 1 capsule (100 mg) by mouth 3 times daily as needed for cough 90 capsule 3    CALCIUM-MAGNESIUM-ZINC PO Take 1 tablet by mouth daily      cetirizine (ZYRTEC) 10 MG tablet Take 10 mg by mouth daily as needed for allergies      chlorhexidine (PERIDEX) 0.12 % solution [CHLORHEXIDINE (PERIDEX) 0.12 % SOLUTION] Apply 15 mL to the mouth or throat at bedtime as needed.       escitalopram (LEXAPRO) 10 MG tablet Take 1 tablet (10 mg) by mouth daily 90 tablet 3    famotidine (PEPCID) 20 MG tablet Take 1 tablet (20 mg) by mouth At Bedtime 90 tablet 3    fluticasone (FLONASE) 50 MCG/ACT nasal spray Spray 1 spray into both nostrils daily as needed for rhinitis or allergies      Fluticasone-Umeclidin-Vilanterol (TRELEGY ELLIPTA) 200-62.5-25 MCG/INH oral inhaler Inhale 1 puff into the lungs At Bedtime      furosemide (LASIX) 40 MG tablet Take 40 mg by mouth 2 times daily      gabapentin (NEURONTIN) 600 MG tablet Take 600 mg by mouth At Bedtime      HYDROmorphone, STANDARD CONC, (DILAUDID) 1 MG/ML oral solution Take 1 mg by mouth 3 times daily as needed for pain      ipratropium (ATROVENT HFA) 17  MCG/ACT inhaler Inhale 2 puffs into the lungs every 6 hours as needed for wheezing      ipratropium - albuterol 0.5 mg/2.5 mg/3 mL (DUONEB) 0.5-2.5 (3) MG/3ML neb solution 1 neb 4 times daily for 3 days then q6hr prn sob 90 mL 0    Melatonin 5 MG TBDP Take 15 mg by mouth At Bedtime      metoprolol succinate ER (TOPROL XL) 50 MG 24 hr tablet Take 1.5 tablets (75 mg) by mouth At Bedtime 135 tablet 1    mirabegron (MYRBETRIQ) 50 MG 24 hr tablet Take 1 tablet (50 mg) by mouth daily 90 tablet 3    multivitamin w/minerals (THERA-VIT-M) tablet Take 1 tablet by mouth daily      nystatin (MYCOSTATIN) 801467 UNIT/GM external cream Apply topically 3 times daily as needed for dry skin      OLANZapine (ZYPREXA) 5 MG tablet Take 1 tablet (5 mg) by mouth At Bedtime 30 tablet 3    prochlorperazine (COMPAZINE) 10 MG tablet Take 1 tablet (10 mg) by mouth every 6 hours as needed (Nausea/Vomiting) 30 tablet 5    rivaroxaban ANTICOAGULANT (XARELTO) 15 MG TABS tablet Take 1 tablet (15 mg) by mouth At Bedtime      simvastatin (ZOCOR) 40 MG tablet Take 1 tablet (40 mg) by mouth At Bedtime 90 tablet 3    sodium chloride 0.9 % neb solution Take 3 mLs by nebulization every 3 hours as needed for wheezing      solifenacin (VESICARE) 5 MG tablet Take 1 tablet (5 mg) by mouth daily 90 tablet 3    sotorasib (LUMAKRAS) 120 MG tablet Take 8 tablets (960 mg) by mouth daily for 30 days Do not chew, crush or split tablets. 240 tablet 0    spironolactone (ALDACTONE) 25 MG tablet Take 12.5 mg by mouth daily      tamoxifen (NOLVADEX) 20 MG tablet Take 1 tablet (20 mg) by mouth daily 90 tablet 3    traMADol (ULTRAM) 50 MG tablet Take 1 tablet (50 mg) by mouth 2 times daily 60 tablet 0    vitamin D3 (CHOLECALCIFEROL) 50 mcg (2000 units) tablet Take 1 tablet by mouth daily      zinc oxide (DESITIN) 40 % external ointment Apply topically as needed for dry skin or irritation (Twice a day to gluteal area skin irritation) 113 g 1    Allergies   Allergen  Reactions    Sulfa (Sulfonamide Antibiotics) [Sulfa Antibiotics] Rash     Headaches and dizziness.    Homeopathic Drugs [External Allergen Needs Reconciliation - See Comment] Unknown     runny nose    Mold Extracts [Molds & Smuts] Unknown    Morphine (Pf) [Morphine] Unknown     hallucinate    Lisinopril Itching, Cough and Unknown     cough    Sulfacetamide Sodium [Sulfacetamide] Rash         Lab Results    Chemistry/lipid CBC Cardiac Enzymes/BNP/TSH/INR   Lab Results   Component Value Date    BUN 21 08/28/2023     (H) 08/28/2023    CO2 30 08/28/2023    Lab Results   Component Value Date    WBC 8.1 08/27/2023    HGB 11.8 08/27/2023    HCT 37.3 08/27/2023    MCV 91 08/27/2023     08/27/2023    Lab Results   Component Value Date    TROPONINI 0.07 08/27/2023     (H) 08/27/2023    INR 1.05 08/27/2023             This note has been dictated using voice recognition software. Any grammatical, typographical, or context distortions are unintentional and inherent to the software    40 minutes spent on the date of encounter doing chart review, review of outside records, review of test results, interpretation with above tests, patient visit, documentation, and discussion with family.

## 2023-09-11 NOTE — LETTER
9/11/2023    Pankaj Montanez MD  [address] [place] [address]    RE: Irene eLón       Dear Colleague,     I had the pleasure of seeing Irene León in the [facility].        Assessment/Recommendations   Assessment:    1.  heart failure with reduced ejection fraction, ejection fraction 20-25%, NYHA class III: Compensated.  She has had increased lower extremity edema and weight gain.  She states her weight increased 2 pounds today.  She took an extra 20 mg of Lasix.  She has not been following a low-sodium diet.  We discussed this in detail today.  2.  Hypertension: Controlled.  Blood pressure 102/54  3.  Severe COPD: She continues chronic oxygen.  She continues to meet with palliative care.  4.  CKD stage IIIb: She had labs a few weeks ago with sodium 146, potassium 4.4, BUN 21 and creatinine 1.67 which are stable    Plan:  1.  BMP pending  2.  Take an extra 20 mg of Lasix today.  Take Lasix 80 mg in the morning and 40 mg in the afternoon tomorrow  3.  Stressed importance of low-sodium diet  4.  Monitor weights daily    Irene León will follow up with Dr. Chilel in 3 months and in the heart failure clinic in 1 month.     History of Present Illness/Subjective    Ms. Irene León is a 78 year old female seen at [facility] heart failure clinic today for continued follow-up.  Her son accompanies her today.  She follows up for heart failure with reduced ejection fraction.  She was recently hospitalized August 27 to August 28 due to acute respiratory failure.  She had not been taking her Lasix and had increased her salt intake.  She received IV Lasix for 1 day and was back to her baseline.  Her last echocardiogram was done June 2023 which showed a reduced ejection fraction of 20 to 25%.  Dr. Chilel had recommended coronary angiogram at that time.  This has not been completed. She has a past medical history significant for hypertension, chronic kidney disease stage III, severe  COPD on chronic oxygen, and history of breast cancer status post lumpectomy and radiation therapy.     Today, she has increased dyspnea on exertion and lower extremity edema.  She denies lightheadedness, orthopnea, PND, palpitations, chest pain, and abdominal fullness/bloating.      She is monitoring home weights which increased 2 pounds today.  She does not follow a low-salt diet     Physical Examination Review of Systems   /54 (BP Location: Right arm, Patient Position: Sitting, Cuff Size: Adult Regular)   Pulse 69   Resp 20   Wt 47.2 kg (104 lb)   SpO2 98%   BMI 21.01 kg/m    Body mass index is 21.01 kg/m .  Wt Readings from Last 3 Encounters:   09/11/23 47.2 kg (104 lb)   08/29/23 48.5 kg (107 lb)   08/27/23 50 kg (110 lb 3.7 oz)       General Appearance:   no acute distress   ENT/Mouth: No abnormalities   EYES:  no scleral icterus, normal conjunctivae   Neck: no thyromegaly   Chest/Lungs:   lungs are decreased to auscultation, no rales or wheezing, equal chest wall expansion, wearing oxygen   Cardiovascular:   Regular. Normal first and second heart sounds with no murmurs, rubs, or gallops, moderate pedal edema bilaterally    Abdomen:  bowel sounds are present   Extremities: no cyanosis or clubbing   Skin: warm   Neurologic: no tremors     Psychiatric: alert and oriented x3                                              Medical History  Surgical History Family History Social History   Past Medical History:   Diagnosis Date    ANATOLIY (acute kidney injury) (H)     Allergic rhinitis     Arrhythmia     Atrial fibrillation with RVR (H)     Bacteremia     Breast cancer (H) 2017    Cardiomyopathy (H)     Centrilobular emphysema (H)     CHF (congestive heart failure) (H)     Chronic kidney disease     CKD (chronic kidney disease)     COPD (chronic obstructive pulmonary disease) (H)     Coronary artery disease     Depression     Dysphagia     E. coli sepsis (H)     Factor 5 Leiden mutation, heterozygous (H)     GERD  (gastroesophageal reflux disease)     Heart failure with reduced ejection fraction (H) 04/17/2023    Hx of radiation therapy 2017    Hyperlipidemia     Hypertension     Hypokalemia     Hypomagnesemia     Idiopathic cardiomyopathy (H)     Left hip pain 04/30/2014    OAB (overactive bladder)     Osteoporosis     Sacral insufficiency fracture     Sinusitis     Syncope     Urge incontinence     Vocal cord dysfunction     Past Surgical History:   Procedure Laterality Date    ARTHROPLASTY REVISION HIP Left     BIOPSY BREAST Right 2017    BLADDER SURGERY      bladder interstim with removal    CARDIAC CATHETERIZATION      CATARACT EXTRACTION Left 07/18/2017    IR ABDOMINAL AORTOGRAM  4/16/2003    IR AORTIC ARCH 4 VESSEL ANGIOGRAM  4/16/2003    IR MISCELLANEOUS PROCEDURE  4/16/2003    LUMPECTOMY BREAST Left 06/2017    x2    PICC DOUBLE LUMEN PLACEMENT  4/11/2022         PICC TRIPLE LUMEN PLACEMENT  4/7/2022         ZZC OPEN TX FEMORAL FRACTURE DISTAL MED/LAT CONDYLE Left 10/28/2015    Procedure: OPEN REDUCTION INTERNAL FIXATION LEFT  PROXIMAL FEMUR PERIPROSTHETIC FRACTURE;  Surgeon: Yovanny Albarran MD;  Location: [facility] Main OR;  Service: Orthopedics    Family History   Problem Relation Age of Onset    Osteoporosis Other     Prostate Cancer Brother     Breast Cancer Maternal Aunt         age thought to be in her 70's-80's    Prostate Cancer Maternal Uncle     Social History     Socioeconomic History    Marital status:      Spouse name: Not on file    Number of children: Not on file    Years of education: Not on file    Highest education level: Not on file   Occupational History    Not on file   Tobacco Use    Smoking status: Former     Types: Cigarettes     Quit date: 10/28/2007     Years since quitting: 15.8    Smokeless tobacco: Former     Quit date: 9/6/2004   Vaping Use    Vaping Use: Former   Substance and Sexual Activity    Alcohol use: No    Drug use: No    Sexual activity: Not on file   Other Topics  Concern    Not on file   Social History Narrative     and lives in home with 3 flight of steps     Social Determinants of Health     Financial Resource Strain: Not on file   Food Insecurity: Not on file   Transportation Needs: Not on file   Physical Activity: Not on file   Stress: Not on file   Social Connections: Not on file   Intimate Partner Violence: Not on file   Housing Stability: Not on file          Medications  Allergies   Current Outpatient Medications   Medication Sig Dispense Refill    acetaminophen (TYLENOL) 500 MG tablet Take 1,000 mg by mouth every 8 hours as needed for mild pain or fever       acetylcysteine (MUCOMYST) 10 % nebulizer solution Inhale 4 mLs into the lungs every 4 hours      albuterol (PROAIR HFA/PROVENTIL HFA/VENTOLIN HFA) 108 (90 Base) MCG/ACT inhaler Inhale 2 puffs into the lungs every 6 hours as needed for shortness of breath, wheezing or cough      Azelastine HCl 137 MCG/SPRAY SOLN Spray 1 spray into both nostrils 2 times daily as needed for rhinitis      benzonatate (TESSALON PERLES) 100 MG capsule Take 1 capsule (100 mg) by mouth 3 times daily as needed for cough 90 capsule 3    CALCIUM-MAGNESIUM-ZINC PO Take 1 tablet by mouth daily      cetirizine (ZYRTEC) 10 MG tablet Take 10 mg by mouth daily as needed for allergies      chlorhexidine (PERIDEX) 0.12 % solution [CHLORHEXIDINE (PERIDEX) 0.12 % SOLUTION] Apply 15 mL to the mouth or throat at bedtime as needed.       escitalopram (LEXAPRO) 10 MG tablet Take 1 tablet (10 mg) by mouth daily 90 tablet 3    famotidine (PEPCID) 20 MG tablet Take 1 tablet (20 mg) by mouth At Bedtime 90 tablet 3    fluticasone (FLONASE) 50 MCG/ACT nasal spray Spray 1 spray into both nostrils daily as needed for rhinitis or allergies      Fluticasone-Umeclidin-Vilanterol (TRELEGY ELLIPTA) 200-62.5-25 MCG/INH oral inhaler Inhale 1 puff into the lungs At Bedtime      furosemide (LASIX) 40 MG tablet Take 40 mg by mouth 2 times daily      gabapentin  (NEURONTIN) 600 MG tablet Take 600 mg by mouth At Bedtime      HYDROmorphone, STANDARD CONC, (DILAUDID) 1 MG/ML oral solution Take 1 mg by mouth 3 times daily as needed for pain      ipratropium (ATROVENT HFA) 17 MCG/ACT inhaler Inhale 2 puffs into the lungs every 6 hours as needed for wheezing      ipratropium - albuterol 0.5 mg/2.5 mg/3 mL (DUONEB) 0.5-2.5 (3) MG/3ML neb solution 1 neb 4 times daily for 3 days then q6hr prn sob 90 mL 0    Melatonin 5 MG TBDP Take 15 mg by mouth At Bedtime      metoprolol succinate ER (TOPROL XL) 50 MG 24 hr tablet Take 1.5 tablets (75 mg) by mouth At Bedtime 135 tablet 1    mirabegron (MYRBETRIQ) 50 MG 24 hr tablet Take 1 tablet (50 mg) by mouth daily 90 tablet 3    multivitamin w/minerals (THERA-VIT-M) tablet Take 1 tablet by mouth daily      nystatin (MYCOSTATIN) 275356 UNIT/GM external cream Apply topically 3 times daily as needed for dry skin      OLANZapine (ZYPREXA) 5 MG tablet Take 1 tablet (5 mg) by mouth At Bedtime 30 tablet 3    prochlorperazine (COMPAZINE) 10 MG tablet Take 1 tablet (10 mg) by mouth every 6 hours as needed (Nausea/Vomiting) 30 tablet 5    rivaroxaban ANTICOAGULANT (XARELTO) 15 MG TABS tablet Take 1 tablet (15 mg) by mouth At Bedtime      simvastatin (ZOCOR) 40 MG tablet Take 1 tablet (40 mg) by mouth At Bedtime 90 tablet 3    sodium chloride 0.9 % neb solution Take 3 mLs by nebulization every 3 hours as needed for wheezing      solifenacin (VESICARE) 5 MG tablet Take 1 tablet (5 mg) by mouth daily 90 tablet 3    sotorasib (LUMAKRAS) 120 MG tablet Take 8 tablets (960 mg) by mouth daily for 30 days Do not chew, crush or split tablets. 240 tablet 0    spironolactone (ALDACTONE) 25 MG tablet Take 12.5 mg by mouth daily      tamoxifen (NOLVADEX) 20 MG tablet Take 1 tablet (20 mg) by mouth daily 90 tablet 3    traMADol (ULTRAM) 50 MG tablet Take 1 tablet (50 mg) by mouth 2 times daily 60 tablet 0    vitamin D3 (CHOLECALCIFEROL) 50 mcg (2000 units) tablet Take  1 tablet by mouth daily      zinc oxide (DESITIN) 40 % external ointment Apply topically as needed for dry skin or irritation (Twice a day to gluteal area skin irritation) 113 g 1    Allergies   Allergen Reactions    Sulfa (Sulfonamide Antibiotics) [Sulfa Antibiotics] Rash     Headaches and dizziness.    Homeopathic Drugs [External Allergen Needs Reconciliation - See Comment] Unknown     runny nose    Mold Extracts [Molds & Smuts] Unknown    Morphine (Pf) [Morphine] Unknown     hallucinate    Lisinopril Itching, Cough and Unknown     cough    Sulfacetamide Sodium [Sulfacetamide] Rash         Lab Results    Chemistry/lipid CBC Cardiac Enzymes/BNP/TSH/INR   Lab Results   Component Value Date    BUN 21 08/28/2023     (H) 08/28/2023    CO2 30 08/28/2023    Lab Results   Component Value Date    WBC 8.1 08/27/2023    HGB 11.8 08/27/2023    HCT 37.3 08/27/2023    MCV 91 08/27/2023     08/27/2023    Lab Results   Component Value Date    TROPONINI 0.07 08/27/2023     (H) 08/27/2023    INR 1.05 08/27/2023             This note has been dictated using voice recognition software. Any grammatical, typographical, or context distortions are unintentional and inherent to the software    40 minutes spent on the date of encounter doing chart review, review of outside records, review of test results, interpretation with above tests, patient visit, documentation, and discussion with family.                Thank you for allowing me to participate in the care of your patient.      Sincerely,     Samantha Herrera, [facility]  cc:   Vivek Fu MD  [facility] [facility] [place] [address]

## 2023-09-11 NOTE — PATIENT INSTRUCTIONS
Irene León,    It was a pleasure to see you today at [facility].     My recommendations after this visit include:  - Please follow up with Dr Chilel in 3 months   - Please follow up with Samantha Herrera in 1 month  - I have checked labs  - Take an extra 20 mg of lasix today  - Take 80 mg of lasix tomorrow morning and 40 mg in the afternoon  - Monitor your sodium intake closely    Samantha Herrera, CNP

## 2023-09-12 NOTE — TELEPHONE ENCOUNTER
CORE RN to reach out to pt on Friday to discuss her response to increased Lasix dosing.   Anushka BENSON RN BSN, CHFN, PCCN-K

## 2023-09-12 NOTE — ORAL ONC MGMT
"  Oral Chemotherapy Monitoring Program    Subjective/Objective:  Irene León is a 78 year old female contacted by phone for a follow-up visit for oral chemotherapy.  I called Stephanie for a quick follow up on her diarrhea. She said it is completely gone now. She took some loperamide and it helped. She states her last diarrhea episode was a couple days ago and so is hopeful it won't return. She has the loperamide on hand if she needs it. Stephanie has no other complaints or concerns.        8/31/2023    11:00 AM 9/7/2023    11:00 AM   ORAL CHEMOTHERAPY   Assessment Type Initial Work up;New Teach Initial Follow up   Diagnosis Code Non-Small Cell Lung Cancer Non-Small Cell Lung Cancer   Providers Dr. Daniela Suarez   Clinic Name/Location [facility]   Drug Name Lumakras (sotorasib) Lumakras (sotorasib)   Dose 960 mg 960 mg   Current Schedule Daily Daily   Cycle Details Continuous Continuous   Start Date of Last Cycle  9/1/2023   Doses missed in last 2 weeks  0   Adherence Assessment  Adherent   Adverse Effects  Diarrhea   Diarrhea  Grade 2   Pharmacist Intervention(diarrhea)  Yes   Intervention(s)  Patient education;OTC recommendation   Any new drug interactions? Yes No   Pharmacist Intervention? Yes    Intervention(s) Patient Education    Is the dose as ordered appropriate for the patient? Yes Yes       Last PHQ-2 Score on record:       6/30/2022    11:24 AM 2/14/2022    10:52 AM   PHQ-2 ( 1999 [employer])   Q1: Little interest or pleasure in doing things 0 0   Q2: Feeling down, depressed or hopeless 0 0   PHQ-2 Score 0 0       Vitals:  BP:   BP Readings from Last 1 Encounters:   09/11/23 102/54     Wt Readings from Last 1 Encounters:   09/11/23 47.2 kg (104 lb)     Estimated body surface area is 1.4 meters squared as calculated from the following:    Height as of 8/29/23: 1.499 m (4' 11\").    Weight as of 9/11/23: 47.2 kg (104 lb).    Labs:  _  Result Component Current Result Ref Range   Sodium 136 (9/11/2023) " 136 - 145 mmol/L     _  Result Component Current Result Ref Range   Potassium 3.8 (9/11/2023) 3.4 - 5.3 mmol/L     _  Result Component Current Result Ref Range   Calcium 9.0 (9/11/2023) 8.8 - 10.2 mg/dL     No results found for Mag within last 30 days.     No results found for Phos within last 30 days.     _  Result Component Current Result Ref Range   Albumin 3.1 (L) (8/27/2023) 3.5 - 5.0 g/dL     _  Result Component Current Result Ref Range   Urea Nitrogen 20.4 (9/11/2023) 8.0 - 23.0 mg/dL     _  Result Component Current Result Ref Range   Creatinine 1.45 (H) (9/11/2023) 0.51 - 0.95 mg/dL     _  Result Component Current Result Ref Range   AST 18 (8/27/2023) 0 - 40 U/L     _  Result Component Current Result Ref Range   ALT <9 (8/27/2023) 0 - 45 U/L     _  Result Component Current Result Ref Range   Bilirubin Total 0.4 (8/27/2023) 0.0 - 1.0 mg/dL     _  Result Component Current Result Ref Range   WBC Count 8.1 (8/27/2023) 4.0 - 11.0 10e3/uL     _  Result Component Current Result Ref Range   Hemoglobin 11.8 (8/27/2023) 11.7 - 15.7 g/dL     _  Result Component Current Result Ref Range   Platelet Count 212 (8/27/2023) 150 - 450 10e3/uL     No results found for ANC within last 30 days.     _  Result Component Current Result Ref Range   Absolute Neutrophils 7.7 (8/27/2023) 1.6 - 8.3 10e3/uL          Assessment/Plan:  Stephanie's doing well, diarrhea has resolved and she has loperamide on hand if she needs it.     Follow-Up:  We will review provider note on 9/21 and then call Stephanie again in October.    Refill Due:  9/22/2023    Melody Juan, PharmD  Oral Chemotherapy Pharmacist  [phone]

## 2023-09-12 NOTE — TELEPHONE ENCOUNTER
----- Message from DELMIS Watson CNP sent at 9/12/2023  7:46 AM CDT -----  Please inform patient of stable lab results.  Kidney function stable.  She is increasing her Lasix for the next few days and told her we would reach out to her by the end of the week.  Thank you   From: Sirisha Kelley  To: Christy William MD  Sent: 2/18/2021 9:15 PM CST  Subject: Non-Urgent Medical Question    Kory Castellanos has an IVIG appointment next week on February 24th. She was wondering if it was still preauthorized by insurance. She was thinking that the preauthorizations ended in February, but wasn't sure when. Hope you are staying safe and warm!     Thank you,  Chemo York

## 2023-09-15 PROBLEM — I50.9 ACUTE ON CHRONIC CONGESTIVE HEART FAILURE, UNSPECIFIED HEART FAILURE TYPE (H): Status: ACTIVE | Noted: 2023-01-01

## 2023-09-15 NOTE — TELEPHONE ENCOUNTER
Please have her continue Lasix 80 mg in the morning and 40 mg in the afternoon. Is she eating out? It looks like she is having lab work next Thursday.  Recommend a BMP that same day.  Labs ordered.  Thank you

## 2023-09-15 NOTE — TELEPHONE ENCOUNTER
"Stephanie is contacted today to review her response to the increased dose of Furosemide. She reports that her ankle and feet swelling are not improving. She reports that her daughter gave her 80mg Lasix this AM, \" because I am so swollen\" My wt has come down only 2#.\"  \"I can't take this peeing all of the time, I go to the bathroom on the way to the bathroom, and it is all I seem to do all day long\" she states.  Reviewed fluid intake, she reports not exceeding 60 oz per day.  Dietary sodium reviewed with emphasis on reading labels to identify mg of sodium per serving. She states that they are trying to do better.    Samantha Herrera, CNP what are your recommendations at this time?    Anushka BENSON RN BSN, CHFN, PCCN-K    "

## 2023-09-15 NOTE — TELEPHONE ENCOUNTER
Medication Question or Refill        What medication are you calling about (include dose and sig)?:     Preferred Pharmacy:   [id number]  [place], [place] - [facility] [address] AT [address] [place] [address]  Phone: [phone] Fax: [phone]    Controlled Substance Agreement on file:   CSA -- Patient Level:     [Media Unavailable] Controlled Substance Agreement - Opioid - Scan on 1/7/2020       Who prescribed the medication?: Dr. Montanez    Do you need a refill? Yes, pt needs furosemide(Lasix) filled today; as she is out    When did you use the medication last? 9/15/2023 am    Okay to leave a detailed message?: Yes at Cell number on file:    Telephone Information:   Mobile [phone]

## 2023-09-15 NOTE — TELEPHONE ENCOUNTER
Patient is notified of recommendation to remain on Lasix 80mg in AM and 40mg in PM daily. Labs on Thursday   She reports that they eat out on occasion but not often.  Anushka BENSON RN BSN, CHFN, PCCN-K

## 2023-09-16 NOTE — ED PROVIDER NOTES
EMERGENCY DEPARTMENT ENCOUNTER      NAME: Irene León  AGE: 78 year old female  YOB: 1945  MRN: [id number]  EVALUATION DATE & TIME: 9/15/2023  7:31 PM    PCP: Pankaj Montanez    ED PROVIDER: Guero Jo D.O.      Chief Complaint   Patient presents with    Shortness of Breath    Edema       FINAL IMPRESSION:  1. Acute on chronic congestive heart failure, unspecified heart failure type (H)        ED COURSE & MEDICAL DECISION MAKIN:44 PM I met with the patient to gather history and to perform my initial exam. I discussed the plan for care while in the Emergency Department.  9:22 PM I spoke with Hospitalist, Dr. Campuzano       Pertinent Labs & Imaging studies reviewed. (See chart for details)  78 year old female presents to the Emergency Department for evaluation of increasing shortness of breath and swelling of bilateral lower extremities.  Patient has known history of COPD and CHF.  Initial differential did include exacerbation of COPD, exacerbation of CHF, pneumonia, COVID-19, influenza, or other acute process.  On testing tonight, her BNP is significantly elevated, and her troponin was equivocal.  No evidence of ischemia on her EKG.  With the swelling in her legs, do believe her exam and history with testing to be consistent with a CHF exacerbation.  She did not have a significant wheezing on exam to suggest COPD exacerbation.  At this time, plan is for admission for diuresis.  Discussed with the hospitalist who agreed to the admission.    Medical Decision Making    History:  Supplemental history from: Documented in chart, if applicable  External Record(s) reviewed: Documented in chart, if applicable.    Work Up:  Chart documentation includes differential considered and any EKGs or imaging independently interpreted by provider, where specified.  In additional to work up documented, I considered the following work up: Documented in chart, if applicable.    External consultation:  Discussion of  management with another provider: Documented in chart, if applicable    Complicating factors:  Care impacted by chronic illness: N/A  Care affected by social determinants of health: N/A    Disposition considerations: Admit.        At the conclusion of the encounter I discussed the results of all of the tests and the disposition. The questions were answered. The patient or family acknowledged understanding and was agreeable with the care plan.        HPI    Patient information was obtained from: Patient.    Use of : N/A        Irene León is a 78 year old female who presents shortness of breath and edema.   Per chart review, patient presented to [facility] on 9/12/23 for evaluation of follow up. I called Stephanie for a quick follow up on her diarrhea. She said it is completely gone now. She took some loperamide and it helped. She states her last diarrhea episode was a couple days ago and so is hopeful it won't return. She has the loperamide on hand if she needs it. Stephanie has no other complaints or concerns.     Patient reports increasing shortness of breath and bilateral lower edema. Patient states that she noticed the swelling in both legs on 9/12. Since then, the swelling has progressively gotten worse. She is also having trouble catching a breath. Her shortness of breath worsens when she is lying flat.  Patient is normally on 3L nasal canula at home, but has been up to 5L recently. Her daughter has been giving her 80 mg of lasix instead of her 40 to help with the edema. Patient has COPD and a history of heart failure. Patient denies smoking or alcohol use.  Patient denies fever, nausea, vomiting, pain while urinating, and other acute symptoms.    REVIEW OF SYSTEMS  Constitutional:  Denies fever, chills, weight loss or weakness  Eyes:  No pain, discharge, redness  HENT:  Denies sore throat, ear pain, congestion  Respiratory: SOB,  no wheeze or cough  Cardiovascular:  No CP,  palpitations  GI:  Denies abdominal pain, nausea, vomiting, diarrhea  : Denies dysuria, hematuria  Musculoskeletal:  Denies any new muscle/joint pain, swelling or loss of function.bilateral edema.  Skin:  Denies rash, pallor  Neurologic:  Denies headache, focal weakness or sensory changes  Lymph: Denies swollen nodes    All other systems negative unless noted in HPI.    PAST MEDICAL HISTORY:  Past Medical History:   Diagnosis Date    ANATOLIY (acute kidney injury) (H)     Allergic rhinitis     Arrhythmia     Atrial fibrillation with RVR (H)     Bacteremia     Breast cancer (H) 2017    Cardiomyopathy (H)     Centrilobular emphysema (H)     CHF (congestive heart failure) (H)     Chronic kidney disease     CKD (chronic kidney disease)     COPD (chronic obstructive pulmonary disease) (H)     Coronary artery disease     Depression     Dysphagia     E. coli sepsis (H)     Factor 5 Leiden mutation, heterozygous (H)     GERD (gastroesophageal reflux disease)     Heart failure with reduced ejection fraction (H) 04/17/2023    Hx of radiation therapy 2017    Hyperlipidemia     Hypertension     Hypokalemia     Hypomagnesemia     Idiopathic cardiomyopathy (H)     Left hip pain 04/30/2014    OAB (overactive bladder)     Osteoporosis     Sacral insufficiency fracture     Sinusitis     Syncope     Urge incontinence     Vocal cord dysfunction        PAST SURGICAL HISTORY:  Past Surgical History:   Procedure Laterality Date    ARTHROPLASTY REVISION HIP Left     BIOPSY BREAST Right 2017    BLADDER SURGERY      bladder interstim with removal    CARDIAC CATHETERIZATION      CATARACT EXTRACTION Left 07/18/2017    IR ABDOMINAL AORTOGRAM  4/16/2003    IR AORTIC ARCH 4 VESSEL ANGIOGRAM  4/16/2003    IR MISCELLANEOUS PROCEDURE  4/16/2003    LUMPECTOMY BREAST Left 06/2017    x2    PICC DOUBLE LUMEN PLACEMENT  4/11/2022         PICC TRIPLE LUMEN PLACEMENT  4/7/2022         ZZC OPEN TX FEMORAL FRACTURE DISTAL MED/LAT CONDYLE Left 10/28/2015     Procedure: OPEN REDUCTION INTERNAL FIXATION LEFT  PROXIMAL FEMUR PERIPROSTHETIC FRACTURE;  Surgeon: Yovanny Albarran MD;  Location: [facility];  Service: Orthopedics         CURRENT MEDICATIONS:    Current Facility-Administered Medications   Medication    acetaminophen (TYLENOL) tablet 1,000 mg    acetylcysteine (MUCOMYST) 10 % nebulizer solution 4 mL    albuterol (PROVENTIL HFA/VENTOLIN HFA) inhaler    azelastine (ASTELIN) nasal spray 1 spray    benzonatate (TESSALON) capsule 100 mg    [START ON 9/16/2023] escitalopram (LEXAPRO) tablet 10 mg    famotidine (PEPCID) tablet 20 mg    fluticasone (FLONASE) 50 MCG/ACT spray 1 spray    [START ON 9/16/2023] fluticasone-vilanterol (BREO ELLIPTA) 200-25 MCG/ACT inhaler 1 puff    And    [START ON 9/16/2023] umeclidinium (INCRUSE ELLIPTA) 62.5 MCG/ACT inhaler 1 puff    furosemide (LASIX) injection 80 mg    gabapentin (NEURONTIN) tablet 600 mg    HYDROmorphone (STANDARD CONC) (DILAUDID) oral solution 1 mg    ipratropium (ATROVENT HFA) Inhaler 2 puff    ipratropium - albuterol 0.5 mg/2.5 mg/3 mL (DUONEB) neb solution 3 mL    metoprolol succinate ER (TOPROL XL) 24 hr tablet 75 mg    [START ON 9/16/2023] mirabegron (MYRBETRIQ) 24 hr tablet 25 mg    OLANZapine (zyPREXA) tablet 5 mg    prochlorperazine (COMPAZINE) tablet 10 mg    rivaroxaban ANTICOAGULANT (XARELTO) tablet 15 mg    simvastatin (ZOCOR) tablet 40 mg    sodium chloride 0.9 % neb solution 3 mL    [START ON 9/16/2023] sotorasib (LUMAKRAS) tablet 960 mg    [START ON 9/16/2023] spironolactone (ALDACTONE) half-tab 12.5 mg    [START ON 9/16/2023] tamoxifen (NOLVADEX) tablet 20 mg    [START ON 9/16/2023] tolterodine ER (DETROL LA) 24 hr capsule 2 mg    traMADol (ULTRAM) tablet 50 mg     Current Outpatient Medications   Medication    acetaminophen (TYLENOL) 500 MG tablet    acetylcysteine (MUCOMYST) 10 % nebulizer solution    albuterol (PROAIR HFA/PROVENTIL HFA/VENTOLIN HFA) 108 (90 Base) MCG/ACT inhaler    Azelastine  HCl 137 MCG/SPRAY SOLN    benzonatate (TESSALON PERLES) 100 MG capsule    CALCIUM-MAGNESIUM-ZINC PO    cetirizine (ZYRTEC) 10 MG tablet    chlorhexidine (PERIDEX) 0.12 % solution    escitalopram (LEXAPRO) 10 MG tablet    famotidine (PEPCID) 20 MG tablet    fluticasone (FLONASE) 50 MCG/ACT nasal spray    Fluticasone-Umeclidin-Vilanterol (TRELEGY ELLIPTA) 200-62.5-25 MCG/INH oral inhaler    furosemide (LASIX) 40 MG tablet    gabapentin (NEURONTIN) 600 MG tablet    HYDROmorphone, STANDARD CONC, (DILAUDID) 1 MG/ML oral solution    ipratropium (ATROVENT HFA) 17 MCG/ACT inhaler    ipratropium - albuterol 0.5 mg/2.5 mg/3 mL (DUONEB) 0.5-2.5 (3) MG/3ML neb solution    Melatonin 5 MG TBDP    metoprolol succinate ER (TOPROL XL) 50 MG 24 hr tablet    mirabegron (MYRBETRIQ) 50 MG 24 hr tablet    multivitamin w/minerals (THERA-VIT-M) tablet    nystatin (MYCOSTATIN) 175673 UNIT/GM external cream    OLANZapine (ZYPREXA) 5 MG tablet    prochlorperazine (COMPAZINE) 10 MG tablet    rivaroxaban ANTICOAGULANT (XARELTO) 15 MG TABS tablet    simvastatin (ZOCOR) 40 MG tablet    sodium chloride 0.9 % neb solution    solifenacin (VESICARE) 5 MG tablet    sotorasib (LUMAKRAS) 120 MG tablet    spironolactone (ALDACTONE) 25 MG tablet    tamoxifen (NOLVADEX) 20 MG tablet    traMADol (ULTRAM) 50 MG tablet    vitamin D3 (CHOLECALCIFEROL) 50 mcg (2000 units) tablet    zinc oxide (DESITIN) 40 % external ointment         ALLERGIES:  Allergies   Allergen Reactions    Sulfa (Sulfonamide Antibiotics) [Sulfa Antibiotics] Rash     Headaches and dizziness.    Homeopathic Drugs [External Allergen Needs Reconciliation - See Comment] Unknown     runny nose    Mold Extracts [Molds & Smuts] Unknown    Morphine (Pf) [Morphine] Unknown     hallucinate    Lisinopril Itching, Cough and Unknown     cough    Sulfacetamide Sodium [Sulfacetamide] Rash       FAMILY HISTORY:  Family History   Problem Relation Age of Onset    Osteoporosis Other     Prostate Cancer  "Brother     Breast Cancer Maternal Aunt         age thought to be in her 70's-80's    Prostate Cancer Maternal Uncle        SOCIAL HISTORY:  Social History     Socioeconomic History    Marital status:    Tobacco Use    Smoking status: Former     Types: Cigarettes     Quit date: 10/28/2007     Years since quitting: 15.8    Smokeless tobacco: Former     Quit date: 9/6/2004   Vaping Use    Vaping Use: Former   Substance and Sexual Activity    Alcohol use: No    Drug use: No   Social History Narrative     and lives in home with 3 flight of steps       VITALS:  Patient Vitals for the past 24 hrs:   BP Temp Temp src Pulse Resp SpO2 Height Weight   09/15/23 2108 -- -- -- 98 -- -- -- --   09/15/23 2100 -- 98.5  F (36.9  C) Axillary 96 30 -- -- --   09/15/23 2040 130/73 -- -- 94 22 99 % -- --   09/15/23 2036 -- (!) 94.5  F (34.7  C) Oral -- -- -- -- --   09/15/23 1934 (!) 189/73 -- -- 101 24 94 % 1.499 m (4' 11\") 49.9 kg (110 lb)       PHYSICAL EXAM    VITAL SIGNS: /73   Pulse 98   Temp 98.5  F (36.9  C) (Axillary)   Resp 30   Ht 1.499 m (4' 11\")   Wt 49.9 kg (110 lb)   SpO2 99%   BMI 22.22 kg/m      PHYSICAL EXAM    VITAL SIGNS: /73   Pulse 98   Temp 98.5  F (36.9  C) (Axillary)   Resp 30   Ht 1.499 m (4' 11\")   Wt 49.9 kg (110 lb)   SpO2 99%   BMI 22.22 kg/m      General Appearance: Well-appearing, well-nourished, Anxious.  Head:  Normocephalic, without obvious abnormality, atraumatic  Eyes:  PERRL, conjunctiva/corneas clear, EOM's intact,  ENT:  Lips, mucosa, and tongue normal, membranes are moist without pallor  Neck:  Normal ROM, symmetrical, trachea midline    Cardio:  Regular rate and rhythm, no murmur, rub or gallop, 2+ pulses symmetric in all extremities  Pulm:  Course respirations bilaterally in lung fields. Moderate increase work of breathing.   Abdomen:  Soft, non-tender, no rebound or guarding.  Musculoskeletal: Full ROM, no cyanosis, good ROM of major joints. 3+ edema " bilateral extremities.   Integument:  Warm, Dry, No erythema, No rash.    Neurologic:  Alert & oriented.  No focal deficits appreciated.    Psychiatric:  Affect normal, Judgment normal, Mood normal.       LABS  Results for orders placed or performed during the hospital encounter of 09/15/23 (from the past 24 hour(s))   Basic metabolic panel   Result Value Ref Range    Sodium 141 136 - 145 mmol/L    Potassium 3.9 3.4 - 5.3 mmol/L    Chloride 105 98 - 107 mmol/L    Carbon Dioxide (CO2) 23 22 - 29 mmol/L    Anion Gap 13 7 - 15 mmol/L    Urea Nitrogen 13.8 8.0 - 23.0 mg/dL    Creatinine 1.45 (H) 0.51 - 0.95 mg/dL    Calcium 8.9 8.8 - 10.2 mg/dL    Glucose 101 (H) 70 - 99 mg/dL    GFR Estimate 37 (L) >60 mL/min/1.73m2   N terminal pro BNP outpatient   Result Value Ref Range    N Terminal Pro BNP Outpatient 9,602 (H) 0 - 1,800 pg/mL   Troponin T, High Sensitivity (now)   Result Value Ref Range    Troponin T, High Sensitivity 38 (H) <=14 ng/L   [place] Draw    Narrative    The following orders were created for panel order [place] Draw.  Procedure                               Abnormality         Status                     ---------                               -----------         ------                     Extra Blue Top Tube[[id number]]                              Final result               Extra Red Top Tube[[id number]]                               Final result               Extra Green Top (Lithium...[[id number]]                                                 Extra Purple Top Tube[[id number]]                                                         Please view results for these tests on the individual orders.   Extra Blue Top Tube   Result Value Ref Range    Hold Specimen JIC    Extra Red Top Tube   Result Value Ref Range    Hold Specimen JIC    ECG 12-LEAD WITH MUSE (LHE)   Result Value Ref Range    Systolic Blood Pressure  mmHg    Diastolic Blood Pressure  mmHg    Ventricular Rate 95 BPM    Atrial Rate 95 BPM    IN  Interval 122 ms    QRS Duration 126 ms     ms    QTc 510 ms    P Axis 75 degrees    R AXIS 10 degrees    T Axis 116 degrees    Interpretation ECG       Sinus rhythm with sinus arrhythmia with occasional Premature ventricular complexes  Possible Left atrial enlargement  Left bundle branch block  Abnormal ECG  When compared with ECG of 27-AUG-2023 00:33,  Premature ventricular complexes are now Present  Premature supraventricular complexes are no longer Present  QRS axis Shifted right  Confirmed by SEE ED PROVIDER NOTE FOR, ECG INTERPRETATION ([id number]),  VEL NAIR ([id number]) on 9/15/2023 8:19:37 PM     XR Chest Port 1 View    Narrative    EXAM: XR CHEST PORT 1 VIEW  LOCATION: [facility]  DATE: 9/15/2023    INDICATION: dyspnea  COMPARISON: 08/28/2023      Impression    IMPRESSION: Since 08/28/2023 there has been increase in the patchy infiltrate in the left midlung worrisome for pneumonitis. The chest is otherwise stable to include findings of bibasilar atelectasis and area of nodularity projected over the right   midlung laterally.   CBC with platelets + differential    Narrative    The following orders were created for panel order CBC with platelets + differential.  Procedure                               Abnormality         Status                     ---------                               -----------         ------                     CBC with platelets and d...[[id number]]  Abnormal            Final result                 Please view results for these tests on the individual orders.   CBC with platelets and differential   Result Value Ref Range    WBC Count 8.4 4.0 - 11.0 10e3/uL    RBC Count 4.12 3.80 - 5.20 10e6/uL    Hemoglobin 11.7 11.7 - 15.7 g/dL    Hematocrit 37.9 35.0 - 47.0 %    MCV 92 78 - 100 fL    MCH 28.4 26.5 - 33.0 pg    MCHC 30.9 (L) 31.5 - 36.5 g/dL    RDW 14.5 10.0 - 15.0 %    Platelet Count 233 150 - 450 10e3/uL    % Neutrophils 68 %    % Lymphocytes 15 %    %  Monocytes 12 %    % Eosinophils 3 %    % Basophils 1 %    % Immature Granulocytes 1 %    NRBCs per 100 WBC 0 <1 /100    Absolute Neutrophils 5.9 1.6 - 8.3 10e3/uL    Absolute Lymphocytes 1.2 0.8 - 5.3 10e3/uL    Absolute Monocytes 1.0 0.0 - 1.3 10e3/uL    Absolute Eosinophils 0.2 0.0 - 0.7 10e3/uL    Absolute Basophils 0.0 0.0 - 0.2 10e3/uL    Absolute Immature Granulocytes 0.0 <=0.4 10e3/uL    Absolute NRBCs 0.0 10e3/uL         RADIOLOGY  XR Chest Port 1 View   Final Result   IMPRESSION: Since 08/28/2023 there has been increase in the patchy infiltrate in the left midlung worrisome for pneumonitis. The chest is otherwise stable to include findings of bibasilar atelectasis and area of nodularity projected over the right    midlung laterally.          EKG:    Rate: 95 bpm  Rhythm: Sinus Rhythm with PVCs  Axis: Normal  Interval: Normal  Conduction: LBBB  QRS: Wide  ST: Normal  T-wave: Normal  QT: Not prolonged  Comparison EKG: no significant change compared to 27 August 2023  Impression:  No acute ischemic change   I have independently reviewed and interpreted today's EKG, pending Cardiologist read        MEDICATIONS GIVEN IN THE EMERGENCY:  Medications   acetaminophen (TYLENOL) tablet 1,000 mg (has no administration in time range)   acetylcysteine (MUCOMYST) 10 % nebulizer solution 4 mL (has no administration in time range)   albuterol (PROVENTIL HFA/VENTOLIN HFA) inhaler (has no administration in time range)   azelastine (ASTELIN) nasal spray 1 spray (has no administration in time range)   benzonatate (TESSALON) capsule 100 mg (has no administration in time range)   escitalopram (LEXAPRO) tablet 10 mg (has no administration in time range)   famotidine (PEPCID) tablet 20 mg (has no administration in time range)   fluticasone (FLONASE) 50 MCG/ACT spray 1 spray (has no administration in time range)   fluticasone-vilanterol (BREO ELLIPTA) 200-25 MCG/ACT inhaler 1 puff (has no administration in time range)     And    umeclidinium (INCRUSE ELLIPTA) 62.5 MCG/ACT inhaler 1 puff (has no administration in time range)   gabapentin (NEURONTIN) tablet 600 mg (has no administration in time range)   HYDROmorphone (STANDARD CONC) (DILAUDID) oral solution 1 mg (has no administration in time range)   ipratropium (ATROVENT HFA) Inhaler 2 puff (has no administration in time range)   ipratropium - albuterol 0.5 mg/2.5 mg/3 mL (DUONEB) neb solution 3 mL (has no administration in time range)   metoprolol succinate ER (TOPROL XL) 24 hr tablet 75 mg (has no administration in time range)   mirabegron (MYRBETRIQ) 24 hr tablet 25 mg (has no administration in time range)   OLANZapine (zyPREXA) tablet 5 mg (has no administration in time range)   prochlorperazine (COMPAZINE) tablet 10 mg (has no administration in time range)   rivaroxaban ANTICOAGULANT (XARELTO) tablet 15 mg (has no administration in time range)   simvastatin (ZOCOR) tablet 40 mg (has no administration in time range)   sodium chloride 0.9 % neb solution 3 mL (has no administration in time range)   tolterodine ER (DETROL LA) 24 hr capsule 2 mg (has no administration in time range)   sotorasib (LUMAKRAS) tablet 960 mg (has no administration in time range)   spironolactone (ALDACTONE) half-tab 12.5 mg (has no administration in time range)   tamoxifen (NOLVADEX) tablet 20 mg (has no administration in time range)   traMADol (ULTRAM) tablet 50 mg (has no administration in time range)   furosemide (LASIX) injection 80 mg (has no administration in time range)   LORazepam (ATIVAN) injection 0.5 mg (0.5 mg Intravenous $Given 9/15/23 2010)   furosemide (LASIX) injection 20 mg (20 mg Intravenous $Given 9/15/23 2127)       NEW PRESCRIPTIONS STARTED AT TODAY'S ER VISIT  New Prescriptions    No medications on file        I, Aline Louie, am serving as a scribe to document services personally performed by Guero Jo D.O., based on my observations and the provider's statements to me.  I, Guero Jo,  DREAD, attest that Adriannaana luisa Jacy is acting in a scribe capacity, has observed my performance of the services and has documented them in accordance with my direction.     Guero Jo D.O.  Emergency Medicine  [facility] EMERGENCY ROOM  [facility] access hospital[address] [facility] [address]  [phone]  Dept: [phone]       Guero Jo,   09/15/23 [id number]

## 2023-09-16 NOTE — PLAN OF CARE
Goal Outcome Evaluation:  Problem: Heart Failure  Goal: Effective Oxygenation and Ventilation  Outcome: Progressing  Intervention: Promote Airway Secretion Clearance    Intervention: Optimize Oxygenation and Ventilation    Goal: Optimal Cardiac Output  Outcome: Progressing     Patient A&Ox4, with intermittent confusion/forgetfulness. C/o pain to back, PRN Tramadol given. BLE sensitive to touch. Wound cares done to LLE, mepilex applied. Denies SOB or H/N/V. Turn and reposition q2h. Purewick in place as patient is incontinent of bladder. Bed alarm on for safety.

## 2023-09-16 NOTE — PLAN OF CARE
Problem: Plan of Care - These are the overarching goals to be used throughout the patient stay.    Goal: Plan of Care Review  Description: The Plan of Care Review/Shift note should be completed every shift.  The Outcome Evaluation is a brief statement about your assessment that the patient is improving, declining, or no change.  This information will be displayed automatically on your shift note.  Outcome: Progressing   Goal Outcome Evaluation:       Pt A&Ox4, VSS. Able to make needs known. Arrived to unit around 2300 last night. Pt CO feeling very cold, bear hugger utilized throughout the night. Pt calls out in pain during cares and repositioning. PRN tramadol given with some relief. Assist of 1 with cane. Used Purewick overnight. Appeared to sleep well between cares.

## 2023-09-16 NOTE — PROGRESS NOTES
HOSPITLAIST PROGRESS NOTE    Assessment and Plan:  78 year old female with past medical history significant for CHF with reduced ejection fraction, cardiomyopathy recent EF 6/23 at 20%, severe COPD, chronic home oxygen 3 L, minimal CAD, paroxysmal A-fib on Xarelto, CKD stage IV, lung cancer on immunotherapy presented to the emergency room with complaints of worsening lower extremity edema ongoing for the last week, being admitted for acute on chronic CHF exacerbation, mildly elevated troponin.    Events;  Spoke to daughter who says that patient had various health issues since start of new med LUMAKRAS that was started1-2 months ago. She is also worried about the skin of bilateral legs    Acute on chronic CHF with reduced EF 20%-25%  Mildly elevated troponin  Cardiomyopathy  Acute on chronic respiratory failure  Continue IV Lasix 80 mg every 8 hours  HOLD off  on Losartan given soft BP  Monitor BMP  Monitor intake and daily output  cardiology consultation  Trend serial troponins  No active chest pain    Bilateral leg skin changes with superficial wounds  Erythema and warmth noted  Concern for infection   Will send inflammation markers and start antibiotics if elevated  Mahendra the outlines of the erythema  WOC consult    COPD  Chronic respiratory failure on home oxygen 3 L  History of lung cancer on immunotherapy  Continue multiple home inhalers, nebs Mucomyst  No sign of exacerbation  Afebrile, no leukocytosis unlikely infection playing a role.    Right upper lobe pulmonary lung canceR  Continue LUMAKRAS    Paroxysmal A-fib  Continue home metoprolol, Xarelto    CKD stage IV  At baseline  Monitor.    Prolonged Qtc  Avoid QT prolonging medications    Chronic pain  On Dilaudid, tramadol at home    Overactive bladder  Continue home meds    History of breast cancer on tamoxifen    History of depression  Continue home meds  History of factor V Leiden heterozygous  DVT prophylaxis: Xarelto  Code: Full    Manasa Salazar  "MD    Expected length of stay : anticipate hospitalization more than 2 days.   Physical Exam:  Vital signs:  Temp: 98.1  F (36.7  C) Temp src: Axillary BP: 95/53 Pulse: 80   Resp: 20 SpO2: 96 % O2 Device: Nasal cannula Oxygen Delivery: 4 LPM Height: 150 cm (4' 11.06\") Weight: 51.3 kg (113 lb 1.5 oz)  Estimated body mass index is 22.8 kg/m  as calculated from the following:    Height as of this encounter: 1.5 m (4' 11.06\").    Weight as of this encounter: 51.3 kg (113 lb 1.5 oz).    General Appearance:  Awake Alert, orientedx3, not in any apparent distress   Head:  Normocephalic, without obvious abnormality   Eyes:  PERRL, conjunctiva/corneas clear   Throat: Oral mucosa moist   Neck: Supple, +JVD   Lungs:   Crackles bilaterally, respirations unlabored   Chest Wall:  No tenderness   Heart:  Regular rate and rhythm, S1, S2 normal,no murmur   Abdomen:   Soft, non-tender, bowel sounds present,  no guarding, rigidity    Extremities: BILATERAL leg wound with erythema    Skin: As above   Neurologic: Alert and oriented X 3, no focal deficits     Results:  Labs Ordered and Resulted from Time of ED Arrival to Time of ED Departure   BASIC METABOLIC PANEL - Abnormal       Result Value    Sodium 141      Potassium 3.9      Chloride 105      Carbon Dioxide (CO2) 23      Anion Gap 13      Urea Nitrogen 13.8      Creatinine 1.45 (*)     Calcium 8.9      Glucose 101 (*)     GFR Estimate 37 (*)    N TERMINAL PRO BNP OUTPATIENT - Abnormal    N Terminal Pro BNP Outpatient 9,602 (*)    TROPONIN T, HIGH SENSITIVITY - Abnormal    Troponin T, High Sensitivity 38 (*)    CBC WITH PLATELETS AND DIFFERENTIAL - Abnormal    WBC Count 8.4      RBC Count 4.12      Hemoglobin 11.7      Hematocrit 37.9      MCV 92      MCH 28.4      MCHC 30.9 (*)     RDW 14.5      Platelet Count 233      % Neutrophils 68      % Lymphocytes 15      % Monocytes 12      % Eosinophils 3      % Basophils 1      % Immature Granulocytes 1      NRBCs per 100 WBC 0      " Absolute Neutrophils 5.9      Absolute Lymphocytes 1.2      Absolute Monocytes 1.0      Absolute Eosinophils 0.2      Absolute Basophils 0.0      Absolute Immature Granulocytes 0.0      Absolute NRBCs 0.0     LACTIC ACID WHOLE BLOOD - Normal    Lactic Acid 0.7        EKG:  EKG: Sinus tachycardia 95/min, left bundle branch block, PVCs, prolonged QTc    Echocardiogram 6/23  The left ventricle is normal in size with mild concentric left ventricular  hypertrophy.  Left ventricular function is decreased. The ejection fraction is 20-25%  (severely reduced). There is moderate global hypokinesia of the left  ventricle.  Grade III or advanced diastolic dysfunction.  Moderately decreased right ventricular systolic function  The left atrium is mildly dilated.  No hemodynamically significant valvular abnormalities on 2D or color flow  imaging.  Compared to the prior study of 4/8/22 there has been a decline in left  ventricular systolic function.  ________________________  Imaging:   XR Chest Port 1 View    Result Date: 9/15/2023  EXAM: XR CHEST PORT 1 VIEW LOCATION: [facility] DATE: 9/15/2023 INDICATION: dyspnea COMPARISON: 08/28/2023     IMPRESSION: Since 08/28/2023 there has been increase in the patchy infiltrate in the left midlung worrisome for pneumonitis. The chest is otherwise stable to include findings of bibasilar atelectasis and area of nodularity projected over the right midlung laterally.    Lung perfusion scan (NM)    Result Date: 8/28/2023  EXAM: NM LUNG SCAN PERFUSION PARTICULATE LOCATION: [facility] DATE: 8/28/2023 INDICATION: Shortness of breath COMPARISON: Chest x-ray dated 08/28/2023 TECHNIQUE: 8.5 mCi technetium-99m MAA, IV. Standard lung perfusion imaging. FINDINGS: Normal pulmonary perfusion without segmental defect.     IMPRESSION: No evidence of pulmonary embolism.    XR Chest 2 Views    Result Date: 8/28/2023  EXAM: XR CHEST 2 VIEWS LOCATION: [facility]  [facility] DATE: 8/28/2023 INDICATION: SOB, Hypoxia COMPARISON: 08/27/2023     IMPRESSION: Stable chest without new focal airspace opacity. Unchanged nodules in the right midlung and lung emphysematous changes. No pleural effusion or pneumothorax. Stable cardiomediastinal silhouette.    US Lower Extremity Venous Duplex Bilateral    Result Date: 8/27/2023  EXAM: ULTRASOUND LOWER EXTREMITY VENOUS DUPLEX BILATERAL LOCATION: [facility] DATE: 08/27/2023 INDICATION: Shortness of breath, hypoxia, unable to get CT scan due to kidney function. History of lung cancer with acute on chronic hypoxic respiratory failure.  Bilateral lower extremity swelling noted. COMPARISON: None. TECHNIQUE: Venous Duplex ultrasound of bilateral lower extremities with and without compression, augmentation and duplex. Color flow and spectral Doppler with waveform analysis performed. FINDINGS: Exam includes the common femoral, femoral, popliteal veins as well as segmentally visualized deep calf veins and greater saphenous vein. RIGHT: No deep vein thrombosis. No superficial thrombophlebitis. No popliteal cyst. LEFT: No deep vein thrombosis. No superficial thrombophlebitis. No popliteal cyst. Small anechoic cystic area in the mid left thigh, of indeterminate etiology     IMPRESSION: 1.  No deep venous thrombosis in the bilateral lower extremities. 2.  Small anechoic cystic area in the mid left thigh, of indeterminate etiology.     XR Chest Port 1 View    Result Date: 8/27/2023  EXAM: XR CHEST PORT 1 VIEW LOCATION: [facility] DATE: 8/27/2023 INDICATION: sob COMPARISON: Chest x-ray 07/03/2023 and CT chest, abdomen and pelvis 08/11/2023     IMPRESSION: Heart size and pulmonary vascularity normal. Emphysema. Left diaphragmatic eventration accounting for the density in the left lung base. Subsegmental atelectasis or scarring both lower lobes. 2.5 cm mass mid right lung as seen on CT. No  new infiltrates or effusions.        > 75 MINUTES SPENT BY ME on the date of service doing chart review, history, exam, documentation & further activities per the note.

## 2023-09-16 NOTE — PHARMACY-ADMISSION MEDICATION HISTORY
Pharmacist Admission Medication History    Admission medication history is complete. The information provided in this note is only as accurate as the sources available at the time of the update.    Medication reconciliation/reorder completed by provider prior to medication history? No    Information Source(s): Family member, Hospital records, and CareEverywhere/SureScripts via in-person    Pertinent Information: instructions for lasix recently increased;  pt had 60 mg on 9/14 and 80 mg on 9/15 AM per daughter;  can supply Lumakras    Changes made to PTA medication list:  Added: None  Deleted: None  Changed: mucomyst changed to prn; tramadol changed to prn    Medication Affordability:  Not including over the counter (OTC) medications, was there a time in the past 3 months when you did not take your medications as prescribed because of cost?: Unable to Assess    Allergies reviewed with patient and updates made in EHR: yes    Medication History Completed By: Marti Rawls [facility] 9/15/2023 9:21 PM    Prior to Admission medications    Medication Sig Last Dose Taking? Auth Provider Long Term End Date   acetaminophen (TYLENOL) 500 MG tablet Take 1,000 mg by mouth every 8 hours as needed for mild pain or fever  Past Month at prn Yes Provider, Historical     acetylcysteine (MUCOMYST) 10 % nebulizer solution Inhale 4 mLs into the lungs every 4 hours as needed for mucolysis/respiratory distress Unknown at prn Yes Unknown, Entered By History     albuterol (PROAIR HFA/PROVENTIL HFA/VENTOLIN HFA) 108 (90 Base) MCG/ACT inhaler Inhale 2 puffs into the lungs every 6 hours as needed for shortness of breath, wheezing or cough Past Month at prn Yes Unknown, Entered By History No    Azelastine HCl 137 MCG/SPRAY SOLN Spray 1 spray into both nostrils 2 times daily as needed for rhinitis Unknown at prn Yes Unknown, Entered By History     benzonatate (TESSALON PERLES) 100 MG capsule Take 1 capsule (100 mg) by mouth 3 times daily as needed for  cough Unknown at prn Yes Tobi Miguel MD     CALCIUM-MAGNESIUM-ZINC PO Take 1 tablet by mouth daily 9/15/2023 at am Yes Unknown, Entered By History     cetirizine (ZYRTEC) 10 MG tablet Take 10 mg by mouth daily as needed for allergies Unknown at prn Yes Reported, Patient     chlorhexidine (PERIDEX) 0.12 % solution [CHLORHEXIDINE (PERIDEX) 0.12 % SOLUTION] Apply 15 mL to the mouth or throat at bedtime as needed.  Unknown at prn Yes Provider, Historical     escitalopram (LEXAPRO) 10 MG tablet Take 1 tablet (10 mg) by mouth daily 9/15/2023 at am Yes Pankaj Montanez MD Yes    famotidine (PEPCID) 20 MG tablet Take 1 tablet (20 mg) by mouth At Bedtime 9/14/2023 at hs Yes Pankaj Montanez MD     fluticasone (FLONASE) 50 MCG/ACT nasal spray Spray 1 spray into both nostrils daily as needed for rhinitis or allergies Unknown at prn Yes Unknown, Entered By History No    Fluticasone-Umeclidin-Vilanterol (TRELEGY ELLIPTA) 200-62.5-25 MCG/INH oral inhaler Inhale 1 puff into the lungs At Bedtime 9/14/2023 at hs Yes Unknown, Entered By History     furosemide (LASIX) 40 MG tablet Take two tablets (80mg) in AM and one tablet (40mg) in PM daily. 9/15/2023 at 80 mg this am Yes Samantha Herrera, APRN CNP Yes    gabapentin (NEURONTIN) 600 MG tablet Take 600 mg by mouth At Bedtime 9/14/2023 at hs Yes Unknown, Entered By History No    HYDROmorphone, STANDARD CONC, (DILAUDID) 1 MG/ML oral solution Take 1 mg by mouth 3 times daily as needed for pain 9/15/2023 at prn Yes Unknown, Entered By History     ipratropium (ATROVENT HFA) 17 MCG/ACT inhaler Inhale 2 puffs into the lungs every 6 hours as needed for wheezing Unknown at prn Yes Unknown, Entered By History No    ipratropium - albuterol 0.5 mg/2.5 mg/3 mL (DUONEB) 0.5-2.5 (3) MG/3ML neb solution Take 1 vial by nebulization every 6 hours as needed for shortness of breath, wheezing or cough Past Month at prn Yes Unknown, Entered By History No    Melatonin 5 MG TBDP Take 15 mg  by mouth At Bedtime 9/14/2023 at hs Yes Unknown, Entered By History     metoprolol succinate ER (TOPROL XL) 50 MG 24 hr tablet Take 1.5 tablets (75 mg) by mouth At Bedtime 9/14/2023 at hs Yes Pankaj Montanez MD Yes    mirabegron (MYRBETRIQ) 50 MG 24 hr tablet Take 1 tablet (50 mg) by mouth daily 9/15/2023 at am Yes Pankaj Montanez MD Yes    multivitamin w/minerals (THERA-VIT-M) tablet Take 1 tablet by mouth daily 9/15/2023 at am Yes Reported, Patient     nystatin (MYCOSTATIN) 009771 UNIT/GM external cream Apply topically 3 times daily as needed for dry skin Unknown at prn Yes Unknown, Entered By History     OLANZapine (ZYPREXA) 5 MG tablet Take 1 tablet (5 mg) by mouth At Bedtime 9/14/2023 at hs Yes Tobi Miguel MD Yes    prochlorperazine (COMPAZINE) 10 MG tablet Take 1 tablet (10 mg) by mouth every 6 hours as needed (Nausea/Vomiting) Unknown at prn Yes Devon Suarez MD     rivaroxaban ANTICOAGULANT (XARELTO) 15 MG TABS tablet Take 1 tablet (15 mg) by mouth At Bedtime 9/14/2023 at hs Yes Unknown, Entered By History No    simvastatin (ZOCOR) 40 MG tablet Take 1 tablet (40 mg) by mouth At Bedtime 9/14/2023 at hs Yes Pankaj Montanez MD Yes    sodium chloride 0.9 % neb solution Take 3 mLs by nebulization every 3 hours as needed for wheezing Unknown at prn Yes Unknown, Entered By History     solifenacin (VESICARE) 5 MG tablet Take 1 tablet (5 mg) by mouth daily 9/15/2023 at am Yes Pankaj Montanez MD Yes    sotorasib (LUMAKRAS) 120 MG tablet Take 8 tablets (960 mg) by mouth daily for 30 days Do not chew, crush or split tablets. 9/15/2023 at am.  has with/can supply Yes Devon Suarez MD  10/1/23   spironolactone (ALDACTONE) 25 MG tablet Take 12.5 mg by mouth daily 9/15/2023 at am Yes Unknown, Entered By History No    tamoxifen (NOLVADEX) 20 MG tablet Take 1 tablet (20 mg) by mouth daily 9/15/2023 at am Yes Devon Suarez MD Yes    traMADol (ULTRAM) 50 MG tablet Take 50 mg by mouth 2 times daily as needed for  severe pain Past Week at prn Yes Unknown, Entered By History     vitamin D3 (CHOLECALCIFEROL) 50 mcg (2000 units) tablet Take 50 mcg by mouth daily 9/15/2023 at am Yes Unknown, Entered By History     zinc oxide (DESITIN) 40 % external ointment Apply topically as needed for dry skin or irritation (Twice a day to gluteal area skin irritation) Unknown at prn Yes Pankaj Montanez MD

## 2023-09-16 NOTE — ED TRIAGE NOTES
Pt has been having increasing bilateral lower edema x 4 weeks, with increasing shortness of breath, pt has increased oxygen and is normally on 3L nasal canula at home, but has been up to 5L recently.  Pts daughter has been giving her 80 mg of lasix instead of her 40 to help with the edema.        Triage Assessment       Row Name 09/15/23 1934       Triage Assessment (Adult)    Airway WDL WDL       Respiratory WDL    Respiratory WDL cough;rhythm/pattern    Rhythm/Pattern, Respiratory gasping    Cough Frequency frequent    Cough Type productive;loose       Skin Circulation/Temperature WDL    Skin Circulation/Temperature WDL WDL       Cardiac WDL    Cardiac WDL WDL       Cognitive/Neuro/Behavioral WDL    Cognitive/Neuro/Behavioral WDL WDL

## 2023-09-16 NOTE — CONSULTS
Thank you, Dr. Campuzano, for asking the [facility] team to see Ms. Irene León for evaluation of CHF.      Assessment/Recommendations   Assessment/Plan:  Acute on chronic systolic congestive heart failure, nonischemic cardiomyopathy, LVEF 20-25%: The patient has been follow-up with heart failure clinic closely.  She developed of worsening bilateral leg edema, gained a few pounds, slightly worsening shortness of breath.  Significantly elevated proBNP.  Agree with aggressive IV diuresis Lasix 80 mg every 8 hours.  Monitor her leg edema, creatinine, weight.  Continue GDMT to optimize her medications.  Continue metoprolol succinate to 75 mg daily, spironolactone 25 mg daily.  She is allergic to lisinopril.  Start losartan 25 mg daily.  Paroxysmal atrial fibrillation: The patient is in sinus rhythm.  Her ventricular rate is well controlled.  Continue metoprolol succinate to 75 mg as mentioned.  Continue anticoagulation Xarelto 15 mg at bedtime.  Minimal coronary artery disease: Stable.  No chest pain.  Benign essential hypertension, dyslipidemia: Well-controlled.  COPD, stage III CKD, lung cancer on immunotherapy: Please see primary team management for details.    Clinically Significant Risk Factors Present on Admission               # Drug Induced Coagulation Defect: home medication list includes an anticoagulant medication    # Hypertension: Noted on problem list  # Acute heart failure with reduced ejection fraction: last echo with EF <40% and receiving IV diuretics         # COPD: noted on problem list   Cardiac Arrhythmia: Atrial fibrillation: Paroxysmal  Cardiomyopathy  Systolic  Fluid overload, unspecified  Lung cancer on immunotherapy  CKD POA List: Stage 3b (GFR 30-44)  COPD  Chronic Fatigue and Other Debilities: Age-related physical debility  Bed confinement status  Chronic fatigue, unspecified          History of Present Illness/Subjective    It is my pleasure to see Irene León at  "[facility] for evaluation of CHF.  Irene León is a 78 year old female with a medical history of nonischemic cardiomyopathy LVEF 20-25%, chronic systolic congestive heart failure, minimal coronary artery disease per coronary angiogram, paroxysmal atrial fibrillation, benign essential hypertension, dyslipidemia, COPD, stage III to stage IV CKD, lung cancer on immunotherapy.    The patient was admitted to hospital due to worsening bilateral leg edema.  She has leg edema leading to skin rupture weeping.  She gained 3 to 4 pounds per patient.  She states that she has slightly worsening shortness of breath on exertion.  She denies chest pain, palpitations, orthopnea or PND.    Her ECG showed sinus rhythm with PACs.  proBNP was elevated to 9602.  High-sensitivity troponin at her baseline.  Her chest x-ray was reported the patient infiltrated in left midlung worrisome for pneumonitis.     Physical Examination Review of Systems   BP (!) 141/85 (BP Location: Left arm)   Pulse 84   Temp 98.3  F (36.8  C) (Axillary)   Resp 20   Ht 1.5 m (4' 11.06\")   Wt 51.3 kg (113 lb 1.5 oz)   SpO2 99%   BMI 22.80 kg/m    Body mass index is 22.8 kg/m .  Wt Readings from Last 3 Encounters:   09/15/23 51.3 kg (113 lb 1.5 oz)   09/11/23 47.2 kg (104 lb)   08/29/23 48.5 kg (107 lb)       Intake/Output Summary (Last 24 hours) at 9/16/2023 1002  Last data filed at 9/16/2023 0842  Gross per 24 hour   Intake 240 ml   Output 2000 ml   Net -1760 ml       General Appearance:   Awake, Alert, No acute distress.   HEENT:  No scleral icterus; the mucous membranes were moist.   Neck: No cervical bruits. No JVD. No thyromegaly.    Chest: The spine was straight. The chest was symmetric.   Lungs:   Bibasilar crackles.  No wheezing.   Cardiovascular:   Regular rhythm and rate, normal first and second heart sounds with no murmurs. No rubs or gallops.    Abdomen:  Soft. No tenderness. Bowels sounds are present "   Extremities: Equal tibial pulses. Mild bilateral leg edema.   Skin: Skin rupture in legs. Warm, Dry.   Musculoskeletal: No tenderness.   Neurologic: Oriented x 3. Mood and affect are appropriate.     A 12 point comprehensive review of systems was negative except as noted.        Medical History  Surgical History Family History Social History   Past Medical History:   Diagnosis Date    ANATOLIY (acute kidney injury) (H)     Allergic rhinitis     Arrhythmia     Atrial fibrillation with RVR (H)     Bacteremia     Breast cancer (H) 2017    Cardiomyopathy (H)     Centrilobular emphysema (H)     CHF (congestive heart failure) (H)     Chronic kidney disease     CKD (chronic kidney disease)     COPD (chronic obstructive pulmonary disease) (H)     Coronary artery disease     Depression     Dysphagia     E. coli sepsis (H)     Factor 5 Leiden mutation, heterozygous (H)     GERD (gastroesophageal reflux disease)     Heart failure with reduced ejection fraction (H) 04/17/2023    Hx of radiation therapy 2017    Hyperlipidemia     Hypertension     Hypokalemia     Hypomagnesemia     Idiopathic cardiomyopathy (H)     Left hip pain 04/30/2014    OAB (overactive bladder)     Osteoporosis     Sacral insufficiency fracture     Sinusitis     Syncope     Urge incontinence     Vocal cord dysfunction     Past Surgical History:   Procedure Laterality Date    ARTHROPLASTY REVISION HIP Left     BIOPSY BREAST Right 2017    BLADDER SURGERY      bladder interstim with removal    CARDIAC CATHETERIZATION      CATARACT EXTRACTION Left 07/18/2017    IR ABDOMINAL AORTOGRAM  4/16/2003    IR AORTIC ARCH 4 VESSEL ANGIOGRAM  4/16/2003    IR MISCELLANEOUS PROCEDURE  4/16/2003    LUMPECTOMY BREAST Left 06/2017    x2    PICC DOUBLE LUMEN PLACEMENT  4/11/2022         PICC TRIPLE LUMEN PLACEMENT  4/7/2022         ZZC OPEN TX FEMORAL FRACTURE DISTAL MED/LAT CONDYLE Left 10/28/2015    Procedure: OPEN REDUCTION INTERNAL FIXATION LEFT  PROXIMAL FEMUR PERIPROSTHETIC  FRACTURE;  Surgeon: Yovanny Albarran MD;  Location: [facility] Main OR;  Service: Orthopedics    Family History   Problem Relation Age of Onset    Osteoporosis Other     Prostate Cancer Brother     Breast Cancer Maternal Aunt         age thought to be in her 70's-80's    Prostate Cancer Maternal Uncle     Social History     Socioeconomic History    Marital status:      Spouse name: Not on file    Number of children: Not on file    Years of education: Not on file    Highest education level: Not on file   Occupational History    Not on file   Tobacco Use    Smoking status: Former     Types: Cigarettes     Quit date: 10/28/2007     Years since quitting: 15.8    Smokeless tobacco: Former     Quit date: 9/6/2004   Vaping Use    Vaping Use: Former   Substance and Sexual Activity    Alcohol use: No    Drug use: No    Sexual activity: Not on file   Other Topics Concern    Not on file   Social History Narrative     and lives in home with 3 flight of steps     Social Determinants of Health     Financial Resource Strain: Not on file   Food Insecurity: Not on file   Transportation Needs: Not on file   Physical Activity: Not on file   Stress: Not on file   Social Connections: Not on file   Intimate Partner Violence: Not on file   Housing Stability: Not on file          Medications  Allergies   Scheduled Meds:   escitalopram  10 mg Oral Daily    famotidine  20 mg Oral Q48H    fluticasone-vilanterol  1 puff Inhalation Daily    And    umeclidinium  1 puff Inhalation Daily    furosemide  80 mg Intravenous Q8H    gabapentin  600 mg Oral At Bedtime    metoprolol succinate ER  75 mg Oral At Bedtime    mirabegron  25 mg Oral Daily    OLANZapine  5 mg Oral At Bedtime    rivaroxaban ANTICOAGULANT  15 mg Oral At Bedtime    simvastatin  40 mg Oral At Bedtime    sotorasib  960 mg Oral Daily    spironolactone  12.5 mg Oral Daily    tamoxifen  20 mg Oral Daily    tolterodine ER  2 mg Oral Daily     Continuous Infusions:  PRN  Meds:.acetaminophen, acetylcysteine, albuterol, azelastine, benzonatate, fluticasone, HYDROmorphone (STANDARD CONC), ipratropium, ipratropium - albuterol 0.5 mg/2.5 mg/3 mL, prochlorperazine, sodium chloride, traMADol Allergies   Allergen Reactions    Sulfa (Sulfonamide Antibiotics) [Sulfa Antibiotics] Rash     Headaches and dizziness.    Homeopathic Drugs [External Allergen Needs Reconciliation - See Comment] Unknown     runny nose    Mold Extracts [Molds & Smuts] Unknown    Morphine (Pf) [Morphine] Unknown     hallucinate    Lisinopril Itching, Cough and Unknown     cough    Sulfacetamide Sodium [Sulfacetamide] Rash         Lab Results    Chemistry/lipid CBC Cardiac Enzymes/BNP/TSH/INR   Lab Results   Component Value Date    BUN 13.8 09/15/2023     09/15/2023    CO2 23 09/15/2023    Lab Results   Component Value Date    WBC 8.4 09/15/2023    HGB 11.7 09/15/2023    HCT 37.9 09/15/2023    MCV 92 09/15/2023     09/15/2023    Lab Results   Component Value Date    TROPONINI 0.07 08/27/2023     (H) 08/27/2023    INR 1.05 08/27/2023

## 2023-09-16 NOTE — PROGRESS NOTES
09/16/23 0945   Appointment Info   Signing Clinician's Name / Credentials (OT) Yoselyn Gracia OTR/L   Living Environment   People in Home spouse   Current Living Arrangements house   Home Accessibility stairs to enter home   Number of Stairs, Main Entrance 2   Stair Railings, Main Entrance railing on right side (ascending)   Transportation Anticipated family or friend will provide   Living Environment Comments standard toilet, walk-in shower, uses 4WW at baseline.   Self-Care   Usual Activity Tolerance moderate   Current Activity Tolerance fair   Equipment Currently Used at Home cane, straight;grab bar, tub/shower;shower chair;walker, rolling   Fall history within last six months no   Instrumental Activities of Daily Living (IADL)   IADL Comments Spouse can assist as needed   General Information   Onset of Illness/Injury or Date of Surgery 09/15/23   Referring Physician Dr. Manasa Salazar   Patient/Family Therapy Goal Statement (OT) feel better   Additional Occupational Profile Info/Pertinent History of Current Problem Pt is a 78 year old female with an acute on chronic CHF exacerabtion. PMH: CHF with reduced ejection fraction, cardiomyopathy recent EF 6/23 at 20%, severe COPD, chronic home oxygen 3 L, minimal CAD, paroxysmal A-fib on Xarelto, CKD stage IV, lung cancer on immunotherapy.   Existing Precautions/Restrictions oxygen therapy device and L/min   Cognitive Status Examination   Orientation Status orientation to person, place and time   Visual Perception   Visual Impairment/Limitations WFL   Sensory   Sensory Quick Adds sensation intact   Pain Assessment   Patient Currently in Pain Yes, see Vital Sign flowsheet   Posture   Posture not impaired   Range of Motion Comprehensive   General Range of Motion bilateral upper extremity ROM WFL   Strength Comprehensive (MMT)   General Manual Muscle Testing (MMT) Assessment no strength deficits identified   Muscle Tone Assessment   Muscle Tone Quick  Adds No deficits were identified   Coordination   Upper Extremity Coordination No deficits were identified   Bed Mobility   Bed Mobility supine-sit   Supine-Sit Logan (Bed Mobility) verbal cues   Comment (Bed Mobility) HOB elevated about 45*   Balance   Balance Assessment sitting balance: dynamic   Balance Comments good   Activities of Daily Living   BADL Assessment/Intervention lower body dressing   Lower Body Dressing Assessment/Training   Position (Lower Body Dressing) long sitting   Logan Level (Lower Body Dressing) moderate assist (50% patient effort)   Clinical Impression   Criteria for Skilled Therapeutic Interventions Met (OT) Yes, treatment indicated   OT Diagnosis decreased fxl ind   Influenced by the following impairments s/p acute on chronic CHF   OT Problem List-Impairments impacting ADL activity tolerance impaired;mobility   Assessment of Occupational Performance 3-5 Performance Deficits   Identified Performance Deficits dressing, bathing, toileting, fxl txrs   Planned Therapy Interventions (OT) ADL retraining;transfer training   Clinical Decision Making Complexity (OT) moderate complexity   Risk & Benefits of therapy have been explained evaluation/treatment results reviewed;care plan/treatment goals reviewed;participants voiced agreement with care plan;participants included;patient   OT Total Evaluation Time   OT Eval, Moderate Complexity Minutes (26800) 10   OT Goals   Therapy Frequency (OT) Daily   OT Predicted Duration/Target Date for Goal Attainment 09/22/23   OT Goals Lower Body Dressing;Toilet Transfer/Toileting;Cardiac Phase 1   OT: Lower Body Dressing Modified independent   OT: Toilet Transfer/Toileting Modified independent   OT: Understanding of cardiac education to maximize quality of life, condition management, and health outcomes Patient;Verbalize   OT: Perform aerobic activity with stable cardiovascular response continuous;5 minutes   OT: Functional/aerobic ambulation  tolerance with stable cardiovascular response in order to return to home and community environment Modified independent;Rolling walker;100 feet   OT: Navigation of stairs simulating home set up with stable cardiovascular response in order to return to home and community environment Modified independent;2 stairs;Rail on right   Interventions   Interventions Quick Adds Self-Care/Home Management   Self-Care/Home Management   Self-Care/Home Mgmt/ADL, Compensatory, Meal Prep Minutes (08684) 24   Symptoms Noted During/After Treatment (Meal Preparation/Planning Training) fatigue   Treatment Detail/Skilled Intervention Pt in bed and agreeable to OT session despite reported pain. Pt completed bed mob with verbal cues for proper technique and LB dressing with MOD A. Pt verbalized understanding regarding cardiac handouts and techniques and left in bed with call bell in reach.   OT Discharge Planning   OT Plan 9/22: LB dressing, toileting/txrs with 4WW, cardiac phase 1   OT Discharge Recommendation (DC Rec) home with outpatient cardiac rehab   OT Rationale for DC Rec Pt is below fxl ind baseline but demo's good potential to reach safe fxn with skilled OT in order to d/c home with spouse support.   OT Brief overview of current status MOD A   Total Session Time   Timed Code Treatment Minutes 24   Total Session Time (sum of timed and untimed services) 34

## 2023-09-16 NOTE — PROGRESS NOTES
0047, RN paged provider Lennie Campuzano. Requested orders for code status, vitals, IV, and activity.

## 2023-09-16 NOTE — H&P
Admission History & Physical  Irene León, 1945, [id number]    [facility]  Pankaj Montanez, [phone]    Assessment and Plan:  78 year old female with past medical history significant for CHF with reduced ejection fraction, cardiomyopathy recent EF 6/23 at 20%, severe COPD, chronic home oxygen 3 L, minimal CAD, paroxysmal A-fib on Xarelto, CKD stage IV, lung cancer on immunotherapy presented to the emergency room with complaints of worsening lower extremity edema ongoing for the last week, being admitted for acute on chronic CHF exacerbation, mildly elevated troponin.      Acute on chronic CHF with reduced EF 20%-25%  Mildly elevated troponin  Cardiomyopathy  Acute on chronic respiratory failure  Continue IV Lasix 80 mg every 8 hours  Monitor BMP  Monitor intake and daily output  Request cardiology consultation  Trend serial troponins  No active chest pain    COPD  Chronic respiratory failure on home oxygen 3 L  History of lung cancer on immunotherapy  Continue multiple home inhalers, nebs Mucomyst  No sign of exacerbation  Afebrile, no leukocytosis unlikely infection playing a role.    Paroxysmal A-fib  Continue home metoprolol, Xarelto    CKD stage IV  At baseline  Monitor.    Prolonged Qtc  Avoid QT prolonging medications    Chronic pain  On Dilaudid, tramadol at home    Overactive bladder  Continue home meds    History of breast cancer on tamoxifen    History of depression  Continue home meds  History of factor V Leiden heterozygous  DVT prophylaxis: Xarelto  Code: Full    Expected length of stay : anticipate hospitalization more than 2 days.     Chief Complaint: Leg swelling, shortness of breath    HPI:     Irene León is a 78 year old female with past medical history significant for CHF with reduced ejection fraction, cardiomyopathy recent EF 6/23 at 20%, severe COPD, chronic home oxygen 3 L, minimal CAD, paroxysmal A-fib on Xarelto, CKD stage IV, lung cancer on  immunotherapy presented to the emergency room with complaints of worsening lower extremity edema ongoing for the last week.  Son and daughter were at bedside and they helped with the history.  She had developed blisters on her left leg that were weeping.  Generalized body swelling and she could not wait her previous clots.  She has orthopnea.  Has been feeling very cold, denied any fevers any chills, increasing cough, chest pain.  No urinary complaints, denies any abdominal pain, nausea vomiting but had abdominal fullness.  Was seen in heart failure clinic on 9/11, Lasix dose was increased but point continued with the persistent swelling and subsequently presented to ED.  Not really compliant with a low-salt diet at home.  Most recently was hospitalized on 8/23 for CHF, COPD exacerbation.    In ED upon arrival vitally she was needing about 5 L of oxygen, lab work-up revealed elevated proBNP 9/6/2002, troponin T mildly elevated at 38, chest x-ray there has been increase in the patchy infiltrate in the left midlung, being admitted for CHF exacerbation, EKG showed known left bundle branch block, PVCs, prolonged QTc.  She was given IV Lasix and admitted.  So far she has 300 mL output.    Medical History  Past Medical History:   Diagnosis Date    ANATOLIY (acute kidney injury) (H)     Allergic rhinitis     Arrhythmia     Atrial fibrillation with RVR (H)     Bacteremia     Breast cancer (H) 2017    Cardiomyopathy (H)     Centrilobular emphysema (H)     CHF (congestive heart failure) (H)     Chronic kidney disease     CKD (chronic kidney disease)     COPD (chronic obstructive pulmonary disease) (H)     Coronary artery disease     Depression     Dysphagia     E. coli sepsis (H)     Factor 5 Leiden mutation, heterozygous (H)     GERD (gastroesophageal reflux disease)     Heart failure with reduced ejection fraction (H) 04/17/2023    Hx of radiation therapy 2017    Hyperlipidemia     Hypertension     Hypokalemia     Hypomagnesemia      Idiopathic cardiomyopathy (H)     Left hip pain 04/30/2014    OAB (overactive bladder)     Osteoporosis     Sacral insufficiency fracture     Sinusitis     Syncope     Urge incontinence     Vocal cord dysfunction       Patient Active Problem List    Diagnosis Date Noted    Acute on chronic congestive heart failure, unspecified heart failure type (H) 09/15/2023     Priority: Medium    Malignant neoplasm of upper lobe of right lung (H) 08/29/2023     Priority: Medium    Pedal edema 07/03/2023     Priority: Medium    Chronic combined systolic and diastolic congestive heart failure (H) 07/03/2023     Priority: Medium    Pneumonia of left upper lobe due to infectious organism 07/03/2023     Priority: Medium    Leukocytosis, unspecified type 06/17/2023     Priority: Medium    Acute pneumonia 06/17/2023     Priority: Medium    Heart failure with reduced ejection fraction (H) 04/17/2023     Priority: Medium    Chronic anticoagulation 10/06/2022     Priority: Medium    Compression fracture of L1 vertebra, sequela 10/06/2022     Priority: Medium    Dyslipidemia 10/06/2022     Priority: Medium    Exacerbation of intermittent asthma, unspecified asthma severity 10/06/2022     Priority: Medium    Fibrocystic breast 10/06/2022     Priority: Medium    Herpes zoster without complication 10/06/2022     Priority: Medium    Metabolic encephalopathy 10/06/2022     Priority: Medium    Pain due to fracture 10/06/2022     Priority: Medium    Urge incontinence 10/06/2022     Priority: Medium    Invasive ductal carcinoma of breast, female, left (H) 07/04/2022     Priority: Medium    Neutrophilia 04/15/2022     Priority: Medium    Abscess of upper lobe of left lung with pneumonia (H) 04/15/2022     Priority: Medium    Prolonged Q-T interval on ECG 04/14/2022     Priority: Medium    Atrial fibrillation with rapid ventricular response (H) 04/08/2022     Priority: Medium    Weight loss 04/08/2022     Priority: Medium    Acute on chronic  respiratory failure with hypoxia (H) 04/08/2022     Priority: Medium    COPD exacerbation (H) 04/07/2022     Priority: Medium    Community acquired pneumonia of left upper lobe of lung 04/07/2022     Priority: Medium    Senile osteoporosis 02/03/2022     Priority: Medium    Factor 5 Leiden mutation, heterozygous (H)      Priority: Medium    Nonischemic cardiomyopathy (H) 08/23/2021     Priority: Medium    COPD (chronic obstructive pulmonary disease) (H) 08/23/2021     Priority: Medium    Benign essential hypertension 08/23/2021     Priority: Medium    Chronic kidney disease, stage IV (severe) (H) 08/23/2021     Priority: Medium    At high risk for impaired skin integrity 04/12/2021     Priority: Medium    ACP (advance care planning) 03/31/2021     Priority: Medium    Chronic systolic congestive heart failure (H) 02/20/2021     Priority: Medium    Acute on chronic diastolic congestive heart failure (H) 10/24/2020     Priority: Medium    Patient's other noncompliance with medication regimen 10/24/2020     Priority: Medium    Muscle weakness (generalized) 10/24/2020     Priority: Medium    Other abnormalities of gait and mobility 10/24/2020     Priority: Medium    Personal history of malignant neoplasm of breast 10/24/2020     Priority: Medium    Personal history of transient ischemic attack (TIA), and cerebral infarction without residual deficits 10/24/2020     Priority: Medium    Wedge compression fracture of t11-T12 vertebra, subsequent encounter for fracture with routine healing 10/24/2020     Priority: Medium    T12 compression fracture (H) 10/19/2020     Priority: Medium    Hypoxia 10/16/2020     Priority: Medium    High frequency sensorineural hearing loss of left ear 12/10/2018     Priority: Medium    Pulsatile tinnitus of both ears 12/10/2018     Priority: Medium    Hot flashes due to tamoxifen 03/30/2018     Priority: Medium    Other chronic sinusitis 03/09/2018     Priority: Medium    Gait disorder  11/02/2017     Priority: Medium    Closed fracture of hip with delayed healing 10/04/2017     Priority: Medium    Hot flashes, menopausal 09/20/2017     Priority: Medium    Malignant neoplasm of central portion of left female breast (H) 07/26/2017     Priority: Medium    Dyspnea 03/29/2016     Priority: Medium    Insomnia 11/02/2015     Priority: Medium    Unspecified asthma, uncomplicated 11/02/2015     Priority: Medium    S/P hip replacement, left 11/02/2015     Priority: Medium    Fracture of femur (H) 10/28/2015     Priority: Medium    GERD (gastroesophageal reflux disease) 10/28/2015     Priority: Medium    Osteoporosis 10/28/2015     Priority: Medium    Post menopausal syndrome 10/28/2015     Priority: Medium    Anisocoria 07/31/2014     Priority: Medium     Formatting of this note might be different from the original.  Overview:   Normal opthalmologic evaluation, evaluated as benign  Formatting of this note might be different from the original.  Normal opthalmologic evaluation, evaluated as benign      Left hip pain 04/30/2014     Priority: Medium    OAB (overactive bladder) 03/27/2014     Priority: Medium    Family history of blood disease 10/03/2012     Priority: Medium     Formatting of this note might be different from the original.  Overview:   inactive icd-9 diagnosis auto replaced with icd-10, display name retained//mporz      Idiopathic edema 04/17/2012     Priority: Medium    Sacral insufficiency fracture 04/17/2012     Priority: Medium    Fissure in skin of foot 01/26/2012     Priority: Medium    Groin pain 08/25/2011     Priority: Medium    Physical deconditioning 08/25/2011     Priority: Medium    Urinary incontinence 08/25/2011     Priority: Medium    Trochanteric bursitis 04/07/2011     Priority: Medium    Depression with anxiety 04/07/2011     Priority: Medium    Osteoarthritis of hip 04/07/2011     Priority: Medium     Formatting of this note might be different from the original.  Overview:    ICD 10    Overview:   ICD 10  Formatting of this note might be different from the original.  ICD 10      Piriformis syndrome 04/07/2011     Priority: Medium    Sacro-iliac pain 04/07/2011     Priority: Medium    Hyperlipidemia 09/28/2010     Priority: Medium    Herniated intervertebral disc 10/02/2009     Priority: Medium    Tendonitis 05/12/2009     Priority: Medium        Surgical History  She  has a past surgical history that includes IR Abdominal Aortogram (4/16/2003); IR Miscellaneous Procedure (4/16/2003); IR Aortic Arch 4 Vessel Angiogram (4/16/2003); Arthroplasty revision hip (Left); OPEN TX FEMORAL FRACTURE DISTAL MED/LAT CONDYLE (Left, 10/28/2015); Cardiac catheterization; Cataract Extraction (Left, 07/18/2017); Biopsy breast (Right, 2017); Bladder surgery; Lumpectomy breast (Left, 06/2017); PICC/Midline Placement (4/7/2022); and PICC/Midline Placement (4/11/2022).     Past Surgical History:   Procedure Laterality Date    ARTHROPLASTY REVISION HIP Left     BIOPSY BREAST Right 2017    BLADDER SURGERY      bladder interstim with removal    CARDIAC CATHETERIZATION      CATARACT EXTRACTION Left 07/18/2017    IR ABDOMINAL AORTOGRAM  4/16/2003    IR AORTIC ARCH 4 VESSEL ANGIOGRAM  4/16/2003    IR MISCELLANEOUS PROCEDURE  4/16/2003    LUMPECTOMY BREAST Left 06/2017    x2    PICC DOUBLE LUMEN PLACEMENT  4/11/2022         PICC TRIPLE LUMEN PLACEMENT  4/7/2022         ZZC OPEN TX FEMORAL FRACTURE DISTAL MED/LAT CONDYLE Left 10/28/2015    Procedure: OPEN REDUCTION INTERNAL FIXATION LEFT  PROXIMAL FEMUR PERIPROSTHETIC FRACTURE;  Surgeon: Yovanny Albarran MD;  Location: [facility] OR;  Service: Orthopedics       Allergies  Allergies   Allergen Reactions    Sulfa (Sulfonamide Antibiotics) [Sulfa Antibiotics] Rash     Headaches and dizziness.    Homeopathic Drugs [External Allergen Needs Reconciliation - See Comment] Unknown     runny nose    Mold Extracts [Molds & Smuts] Unknown    Morphine (Pf) [Morphine]  "Unknown     hallucinate    Lisinopril Itching, Cough and Unknown     cough    Sulfacetamide Sodium [Sulfacetamide] Rash       Prior to Admission Medications   (Not in a hospital admission)      Social History  Reviewed, and she  reports that she quit smoking about 15 years ago. Her smoking use included cigarettes. She quit smokeless tobacco use about 19 years ago. She reports that she does not drink alcohol and does not use drugs.  Social History     Tobacco Use    Smoking status: Former     Types: Cigarettes     Quit date: 10/28/2007     Years since quitting: 15.8    Smokeless tobacco: Former     Quit date: 9/6/2004   Substance Use Topics    Alcohol use: No       Family History  Reviewed, and I have reviewed this patient's family history and updated it with pertinent information if needed.  Family History   Problem Relation Age of Onset    Osteoporosis Other     Prostate Cancer Brother     Breast Cancer Maternal Aunt         age thought to be in her 70's-80's    Prostate Cancer Maternal Uncle           Review Of Systems  Complete As per admission HPI, all other systems reviewed and negative.     Physical Exam:  Vital signs:  Temp: 98.5  F (36.9  C) Temp src: Axillary BP: 130/73 Pulse: 96   Resp: 30 SpO2: 99 % O2 Device: Nasal cannula Oxygen Delivery: 5 LPM Height: 149.9 cm (4' 11\") Weight: 49.9 kg (110 lb)  Estimated body mass index is 22.22 kg/m  as calculated from the following:    Height as of this encounter: 1.499 m (4' 11\").    Weight as of this encounter: 49.9 kg (110 lb).    General Appearance:  Awake Alert, orientedx3, not in any apparent distress   Head:  Normocephalic, without obvious abnormality   Eyes:  PERRL, conjunctiva/corneas clear   Throat: Oral mucosa moist   Neck: Supple, +JVD   Lungs:   Crackles bilaterally, respirations unlabored   Chest Wall:  No tenderness   Heart:  Regular rate and rhythm, S1, S2 normal,no murmur   Abdomen:   Soft, non-tender, bowel sounds present,  no guarding, rigidity  "   Extremities: Left leg wound is wrapped, 2+ swelling in both lower extremities   Skin: As above   Neurologic: Alert and oriented X 3, no focal deficits     Results:  Labs Ordered and Resulted from Time of ED Arrival to Time of ED Departure   BASIC METABOLIC PANEL - Abnormal       Result Value    Sodium 141      Potassium 3.9      Chloride 105      Carbon Dioxide (CO2) 23      Anion Gap 13      Urea Nitrogen 13.8      Creatinine 1.45 (*)     Calcium 8.9      Glucose 101 (*)     GFR Estimate 37 (*)    N TERMINAL PRO BNP OUTPATIENT - Abnormal    N Terminal Pro BNP Outpatient 9,602 (*)    TROPONIN T, HIGH SENSITIVITY - Abnormal    Troponin T, High Sensitivity 38 (*)    CBC WITH PLATELETS AND DIFFERENTIAL      EKG:  EKG: Sinus tachycardia 95/min, left bundle branch block, PVCs, prolonged QTc    Echocardiogram 6/23  The left ventricle is normal in size with mild concentric left ventricular  hypertrophy.  Left ventricular function is decreased. The ejection fraction is 20-25%  (severely reduced). There is moderate global hypokinesia of the left  ventricle.  Grade III or advanced diastolic dysfunction.  Moderately decreased right ventricular systolic function  The left atrium is mildly dilated.  No hemodynamically significant valvular abnormalities on 2D or color flow  imaging.  Compared to the prior study of 4/8/22 there has been a decline in left  ventricular systolic function.  ________________________  Imaging:   XR Chest Port 1 View    Result Date: 9/15/2023  EXAM: XR CHEST PORT 1 VIEW LOCATION: [facility] DATE: 9/15/2023 INDICATION: dyspnea COMPARISON: 08/28/2023     IMPRESSION: Since 08/28/2023 there has been increase in the patchy infiltrate in the left midlung worrisome for pneumonitis. The chest is otherwise stable to include findings of bibasilar atelectasis and area of nodularity projected over the right midlung laterally.    Lung perfusion scan (NM)    Result Date: 8/28/2023  EXAM: NM  LUNG SCAN PERFUSION PARTICULATE LOCATION: [facility] DATE: 8/28/2023 INDICATION: Shortness of breath COMPARISON: Chest x-ray dated 08/28/2023 TECHNIQUE: 8.5 mCi technetium-99m MAA, IV. Standard lung perfusion imaging. FINDINGS: Normal pulmonary perfusion without segmental defect.     IMPRESSION: No evidence of pulmonary embolism.    XR Chest 2 Views    Result Date: 8/28/2023  EXAM: XR CHEST 2 VIEWS LOCATION: [facility] DATE: 8/28/2023 INDICATION: SOB, Hypoxia COMPARISON: 08/27/2023     IMPRESSION: Stable chest without new focal airspace opacity. Unchanged nodules in the right midlung and lung emphysematous changes. No pleural effusion or pneumothorax. Stable cardiomediastinal silhouette.    US Lower Extremity Venous Duplex Bilateral    Result Date: 8/27/2023  EXAM: ULTRASOUND LOWER EXTREMITY VENOUS DUPLEX BILATERAL LOCATION: [facility] DATE: 08/27/2023 INDICATION: Shortness of breath, hypoxia, unable to get CT scan due to kidney function. History of lung cancer with acute on chronic hypoxic respiratory failure.  Bilateral lower extremity swelling noted. COMPARISON: None. TECHNIQUE: Venous Duplex ultrasound of bilateral lower extremities with and without compression, augmentation and duplex. Color flow and spectral Doppler with waveform analysis performed. FINDINGS: Exam includes the common femoral, femoral, popliteal veins as well as segmentally visualized deep calf veins and greater saphenous vein. RIGHT: No deep vein thrombosis. No superficial thrombophlebitis. No popliteal cyst. LEFT: No deep vein thrombosis. No superficial thrombophlebitis. No popliteal cyst. Small anechoic cystic area in the mid left thigh, of indeterminate etiology     IMPRESSION: 1.  No deep venous thrombosis in the bilateral lower extremities. 2.  Small anechoic cystic area in the mid left thigh, of indeterminate etiology.     XR Chest Port 1 View    Result Date:  8/27/2023  EXAM: XR CHEST PORT 1 VIEW LOCATION: [facility] DATE: 8/27/2023 INDICATION: sob COMPARISON: Chest x-ray 07/03/2023 and CT chest, abdomen and pelvis 08/11/2023     IMPRESSION: Heart size and pulmonary vascularity normal. Emphysema. Left diaphragmatic eventration accounting for the density in the left lung base. Subsegmental atelectasis or scarring both lower lobes. 2.5 cm mass mid right lung as seen on CT. No new infiltrates or effusions.        > 75 MINUTES SPENT BY ME on the date of service doing chart review, history, exam, documentation & further activities per the note.      Lennie Campuzano M.D  [facility] Service  Internal Medicine    9/15/2023  9:24 PM

## 2023-09-17 NOTE — PROGRESS NOTES
HOSPITLAIST PROGRESS NOTE    Assessment and Plan:  78 year old female with past medical history significant for CHF with reduced ejection fraction, cardiomyopathy recent EF 6/23 at 20%, severe COPD, chronic home oxygen 3 L, minimal CAD, paroxysmal A-fib on Xarelto, CKD stage IV, lung cancer on immunotherapy presented to the emergency room with complaints of worsening lower extremity edema ongoing for the last week, being admitted for acute on chronic CHF exacerbation, mildly elevated troponin.    Events;  Patient feels and look better   On 2 litres oxygen   Chest with less crackles  Legs with much less edema  Daughter concerned that henry is not adherent to 2 g salt diet; will involve dietician    Acute on chronic CHF with reduced EF 20%-25%  Mildly elevated troponin  Cardiomyopathy  Acute on chronic respiratory failure  Continue IV Lasix 80 mg every 8 hours- possible oral switch tomorrow  Metoprolol 75 mg daily   Increase SPIRONO to 25 mg daily per cards  HOLD off  on Losartan given soft BP/ bump in creatine- consider start tomorrow  Monitor BMP daily  Monitor intake and daily output  cardiology following  On discharge, Patient will need to switch to bumex or adding metolazone to her regimen as she and her daughter says she is no longer responding to lasix with good UOP  Dietician to re view for tight salt restriction  PT/OT      Bilateral leg skin changes with superficial wounds  Sacral ulcer  Legs are not celllultitic  Wound care  WOC consult    COPD  Chronic respiratory failure on home oxygen 3 L  History of lung cancer on immunotherapy  Continue multiple home inhalers, nebs Mucomyst  No sign of exacerbation  Afebrile, no leukocytosis unlikely infection playing a role.    Right upper lobe pulmonary lung canceR  Continue LUMAKRAS    Paroxysmal A-fib  Continue home metoprolol, Xarelto    CKD stage IV  At baseline  Monitor.    Prolonged Qtc  Avoid QT prolonging medications    Chronic pain  On Dilaudid, tramadol  "at home    Overactive bladder  Continue home meds    History of breast cancer on tamoxifen    History of depression  Continue home meds  History of factor V Leiden heterozygous  DVT prophylaxis: Xarelto  Code: Gerda Salazar MD    Expected length of stay : anticipate hospitalization more than 2 days.   Physical Exam:  Vital signs:  Temp: 98  F (36.7  C) Temp src: Axillary BP: 105/56 Pulse: 76   Resp: 20 SpO2: 91 % O2 Device: Nasal cannula with humidification Oxygen Delivery: 3 LPM Height: 150 cm (4' 11.06\") Weight: 50.4 kg (111 lb 1.8 oz)  Estimated body mass index is 22.4 kg/m  as calculated from the following:    Height as of this encounter: 1.5 m (4' 11.06\").    Weight as of this encounter: 50.4 kg (111 lb 1.8 oz).    General Appearance:  Awake Alert, orientedx3, not in any apparent distress   Head:  Normocephalic, without obvious abnormality   Eyes:  PERRL, conjunctiva/corneas clear   Throat: Oral mucosa moist   Neck: Supple, +JVD   Lungs:   Crackles bilaterally, respirations unlabored   Chest Wall:  No tenderness   Heart:  Regular rate and rhythm, S1, S2 normal,no murmur   Abdomen:   Soft, non-tender, bowel sounds present,  no guarding, rigidity    Extremities: BILATERAL leg wound with erythema    Skin: As above   Neurologic: Alert and oriented X 3, no focal deficits     Results:  Labs Ordered and Resulted from Time of ED Arrival to Time of ED Departure   BASIC METABOLIC PANEL - Abnormal       Result Value    Sodium 141      Potassium 3.9      Chloride 105      Carbon Dioxide (CO2) 23      Anion Gap 13      Urea Nitrogen 13.8      Creatinine 1.45 (*)     Calcium 8.9      Glucose 101 (*)     GFR Estimate 37 (*)    N TERMINAL PRO BNP OUTPATIENT - Abnormal    N Terminal Pro BNP Outpatient 9,602 (*)    TROPONIN T, HIGH SENSITIVITY - Abnormal    Troponin T, High Sensitivity 38 (*)    CBC WITH PLATELETS AND DIFFERENTIAL - Abnormal    WBC Count 8.4      RBC Count 4.12      Hemoglobin 11.7      Hematocrit 37.9   "    MCV 92      MCH 28.4      MCHC 30.9 (*)     RDW 14.5      Platelet Count 233      % Neutrophils 68      % Lymphocytes 15      % Monocytes 12      % Eosinophils 3      % Basophils 1      % Immature Granulocytes 1      NRBCs per 100 WBC 0      Absolute Neutrophils 5.9      Absolute Lymphocytes 1.2      Absolute Monocytes 1.0      Absolute Eosinophils 0.2      Absolute Basophils 0.0      Absolute Immature Granulocytes 0.0      Absolute NRBCs 0.0     LACTIC ACID WHOLE BLOOD - Normal    Lactic Acid 0.7        EKG:  EKG: Sinus tachycardia 95/min, left bundle branch block, PVCs, prolonged QTc    Echocardiogram 6/23  The left ventricle is normal in size with mild concentric left ventricular  hypertrophy.  Left ventricular function is decreased. The ejection fraction is 20-25%  (severely reduced). There is moderate global hypokinesia of the left  ventricle.  Grade III or advanced diastolic dysfunction.  Moderately decreased right ventricular systolic function  The left atrium is mildly dilated.  No hemodynamically significant valvular abnormalities on 2D or color flow  imaging.  Compared to the prior study of 4/8/22 there has been a decline in left  ventricular systolic function.  ________________________  Imaging:   XR Chest Port 1 View    Result Date: 9/15/2023  EXAM: XR CHEST PORT 1 VIEW LOCATION: [facility] DATE: 9/15/2023 INDICATION: dyspnea COMPARISON: 08/28/2023     IMPRESSION: Since 08/28/2023 there has been increase in the patchy infiltrate in the left midlung worrisome for pneumonitis. The chest is otherwise stable to include findings of bibasilar atelectasis and area of nodularity projected over the right midlung laterally.    Lung perfusion scan (NM)    Result Date: 8/28/2023  EXAM: NM LUNG SCAN PERFUSION PARTICULATE LOCATION: [facility] DATE: 8/28/2023 INDICATION: Shortness of breath COMPARISON: Chest x-ray dated 08/28/2023 TECHNIQUE: 8.5 mCi technetium-99m  MAA, IV. Standard lung perfusion imaging. FINDINGS: Normal pulmonary perfusion without segmental defect.     IMPRESSION: No evidence of pulmonary embolism.    XR Chest 2 Views    Result Date: 8/28/2023  EXAM: XR CHEST 2 VIEWS LOCATION: [facility] DATE: 8/28/2023 INDICATION: SOB, Hypoxia COMPARISON: 08/27/2023     IMPRESSION: Stable chest without new focal airspace opacity. Unchanged nodules in the right midlung and lung emphysematous changes. No pleural effusion or pneumothorax. Stable cardiomediastinal silhouette.    US Lower Extremity Venous Duplex Bilateral    Result Date: 8/27/2023  EXAM: ULTRASOUND LOWER EXTREMITY VENOUS DUPLEX BILATERAL LOCATION: [facility] DATE: 08/27/2023 INDICATION: Shortness of breath, hypoxia, unable to get CT scan due to kidney function. History of lung cancer with acute on chronic hypoxic respiratory failure.  Bilateral lower extremity swelling noted. COMPARISON: None. TECHNIQUE: Venous Duplex ultrasound of bilateral lower extremities with and without compression, augmentation and duplex. Color flow and spectral Doppler with waveform analysis performed. FINDINGS: Exam includes the common femoral, femoral, popliteal veins as well as segmentally visualized deep calf veins and greater saphenous vein. RIGHT: No deep vein thrombosis. No superficial thrombophlebitis. No popliteal cyst. LEFT: No deep vein thrombosis. No superficial thrombophlebitis. No popliteal cyst. Small anechoic cystic area in the mid left thigh, of indeterminate etiology     IMPRESSION: 1.  No deep venous thrombosis in the bilateral lower extremities. 2.  Small anechoic cystic area in the mid left thigh, of indeterminate etiology.     XR Chest Port 1 View    Result Date: 8/27/2023  EXAM: XR CHEST PORT 1 VIEW LOCATION: [facility] DATE: 8/27/2023 INDICATION: sob COMPARISON: Chest x-ray 07/03/2023 and CT chest, abdomen and pelvis 08/11/2023      IMPRESSION: Heart size and pulmonary vascularity normal. Emphysema. Left diaphragmatic eventration accounting for the density in the left lung base. Subsegmental atelectasis or scarring both lower lobes. 2.5 cm mass mid right lung as seen on CT. No new infiltrates or effusions.        > 75 MINUTES SPENT BY ME on the date of service doing chart review, history, exam, documentation & further activities per the note.

## 2023-09-17 NOTE — PLAN OF CARE
Goal Outcome Evaluation:  Problem: Heart Failure  Goal: Effective Oxygenation and Ventilation  Outcome: Progressing  Intervention: Promote Airway Secretion Clearance  Intervention: Optimize Oxygenation and Ventilation    Problem: Heart Failure  Goal: Fluid and Electrolyte Balance  Outcome: Progressing     Patient A&Ox4 with intermittent confusion and forgetfulness. VSS. Denies SOB or H/N/V. C/o pain to BLE when touched and pain to right wrist where old IV was placed, PRN Tramadol given with good relief. Purewick in place per pt request as she can be incontinent of bladder and is on IV lasix. Turn and repositioned when able but patient can shift weight independently with encouragement. No new skin issues noted, drsg to wound on left leg C/D/I. Bed alarm on for safety.

## 2023-09-17 NOTE — PROGRESS NOTES
Assessment/Plan:  Acute on chronic systolic congestive heart failure, nonischemic cardiomyopathy, LVEF 20-25%: The patient has been follow-up with heart failure clinic closely.  She developed of worsening bilateral leg edema, gained a few pounds, slightly worsening shortness of breath.  Significantly elevated proBNP.  Agree with aggressive IV diuresis Lasix 80 mg every 8 hours.  Monitor her leg edema, creatinine, weight.  Continue GDMT to optimize her medications.  Continue metoprolol succinate 75 mg daily, increasing spironolactone to 25 mg daily.  Start losartan 25 mg daily if her blood pressure is tolerable.  Paroxysmal atrial fibrillation: The patient is in sinus rhythm.  Her ventricular rate is well controlled.  Continue metoprolol succinate 75 mg as mentioned.  Continue anticoagulation Xarelto 15 mg at bedtime.  Minimal coronary artery disease: Stable.  No chest pain.  Benign essential hypertension, dyslipidemia: Well-controlled.  COPD, stage III CKD, lung cancer on immunotherapy: Please see primary team management for details.    Subjective:  Interval History:  Has no complaints of chest pain. Complains of shortness of breath.  No palpitations.  Leg edema is improved.    Current Medications:  Scheduled Meds:   escitalopram  10 mg Oral Daily    famotidine  20 mg Oral Q48H    fluticasone-vilanterol  1 puff Inhalation Daily    And    umeclidinium  1 puff Inhalation Daily    furosemide  80 mg Intravenous Q8H    gabapentin  600 mg Oral At Bedtime    metoprolol succinate ER  75 mg Oral At Bedtime    mirabegron  25 mg Oral Daily    OLANZapine  5 mg Oral At Bedtime    rivaroxaban ANTICOAGULANT  15 mg Oral At Bedtime    simvastatin  40 mg Oral At Bedtime    sotorasib  960 mg Oral Daily    spironolactone  12.5 mg Oral Daily    tamoxifen  20 mg Oral Daily    tolterodine ER  2 mg Oral Daily     Continuous Infusions:  PRN Meds:.acetaminophen, acetylcysteine, albuterol, azelastine, benzonatate, fluticasone,  HYDROmorphone (STANDARD CONC), ipratropium, ipratropium - albuterol 0.5 mg/2.5 mg/3 mL, prochlorperazine, sodium chloride, traMADol    Objective:   Vital signs in last 24 hours:  Temp:  [97.7  F (36.5  C)-98.4  F (36.9  C)] 98  F (36.7  C)  Pulse:  [70-93] 76  Resp:  [20-24] 20  BP: ()/(51-57) 105/56  SpO2:  [90 %-98 %] 91 %  Weight:   [unfilled]     Physical Exam:  General Appearance:   Awake, Alert, No acute distress.   HEENT:  No scleral icterus; the mucous membranes were moist.   Neck: No cervical bruits. No jugular venous distention   Lungs:   Bibasilar crackles. No wheezing.   Cardiovascular:   RRR, normal first and second heart sounds with no murmurs. No rubs or gallops.     Abdomen:  Soft. No tenderness. Bowels sounds are present   Extremities: No leg edema. Equal posterior tibial pulsese.   Skin: Warm, Dry. No rashes.   Neurologic: Mood and affect are appropriate. No focal deficits.         Cardiographics:   Report: personally reviewed. .      Tele monitoring -  Sinus rhythm    Echocardiogram on 6-:  The left ventricle is normal in size with mild concentric left ventricular hypertrophy.  Left ventricular function is decreased. The ejection fraction is 20-25%  (severely reduced). There is moderate global hypokinesia of the left ventricle.  Grade III or advanced diastolic dysfunction.  Moderately decreased right ventricular systolic function  The left atrium is mildly dilated.  No hemodynamically significant valvular abnormalities on 2D or color flow imaging.  Compared to the prior study of 4/8/22 there has been a decline in left ventricular systolic function.      Lab Results:   personally reviewed.     Lab Results   Component Value Date     09/17/2023    CO2 30 09/17/2023    CO2 30 08/28/2023    BUN 18.5 09/17/2023    BUN 21 08/28/2023     Lab Results   Component Value Date    TROPONINI 0.07 08/27/2023     Lab Results   Component Value Date    WBC 9.3 09/16/2023    HGB 12.3 09/16/2023     HCT 40.5 09/16/2023    MCV 93 09/16/2023     09/16/2023     No results found for: CHOL, TRIG, HDL, LDL, LDLDIRECT

## 2023-09-17 NOTE — CONSULTS
NUTRITION EDUCATION      REASON FOR ASSESSMENT:  Provider Order- 2 gram sodium diet education     NUTRITION HISTORY:  Information obtained from patient.     Met with pt at bedside this AM. Pt reports hx of diet education many years ago. Pt states she does not cook at home, for the last 8 or 9 months she and her  eat out or order in foods for most meals. Pt states she eats primarily at chain restaurants- pt mentioned [employer], [employer].     CURRENT DIET:  2 gram sodium diet     NUTRITION DIAGNOSIS:  Food- and nutrition-related knowledge deficit R/t low sodium diet AEB need for diet education.     INTERVENTIONS:    Nutrition Prescription:  2 gram sodium die     Implementation:      *  Nutrition Education (Content):   A)  Provided handouts- low sodium nutrition therapy, heart healthy nutrition label reading, Sodium free flavoring tips.    B)  Discussed handouts, reading nutrition labels, researching nutrition facts for restaurants and fast food establishments, low sodium foods to look for when eating out, easy food options to prepare at home.       *  Nutrition Education (Application):   A)  Discussed current eating habits and recommended alternative food choices      *  Anticipate good compliance      *  Diet Education - refer to Education Flowsheet    Goals:      *  Patient will verbalize understanding of diet       *  All of the above goals met during the education session    Follow Up/Monitoring:      *  Provided RD contact information for future questions      *  Recommended Out-Patient Nutrition Referral, if further diet instructions are needed

## 2023-09-17 NOTE — PLAN OF CARE
Problem: Plan of Care - These are the overarching goals to be used throughout the patient stay.    Goal: Plan of Care Review  Description: The Plan of Care Review/Shift note should be completed every shift.  The Outcome Evaluation is a brief statement about your assessment that the patient is improving, declining, or no change.  This information will be displayed automatically on your shift note.  Outcome: Progressing   Goal Outcome Evaluation:       Pt A&Ox4, VSS. Denied pain, SOB. Able to make needs known. Assist of 1 with cane. Used Purewick overnight. Appeared to sleep well between cares.

## 2023-09-17 NOTE — CONSULTS
Care Management Initial Consult    General Information  Assessment completed with: Patient, Family, patient, daughterLeelee  Type of CM/SW Visit: Initial Assessment    Primary Care Provider verified and updated as needed: Yes   Readmission within the last 30 days: previous discharge plan unsuccessful   Return Category: Progression of disease  Reason for Consult: discharge planning  Advance Care Planning: Advance Care Planning Reviewed: no concerns identified          Communication Assessment  Patient's communication style: spoken language (English or Bilingual)    Hearing Difficulty or Deaf: no   Wear Glasses or Blind: no    Cognitive  Cognitive/Neuro/Behavioral: WDL           Mood/Behavior: anxious, uncooperative          Living Environment:   People in home: spouse  Mau Graham  Current living Arrangements: house      Able to return to prior arrangements: yes       Family/Social Support:  Care provided by: self  Provides care for: no one, unable/limited ability to care for self  Marital Status:   Children          Description of Support System: Supportive, Involved    Support Assessment: Adequate family and caregiver support, Adequate social supports    Current Resources:   Patient receiving home care services: No     Community Resources: None  Equipment currently used at home: cane, straight, grab bar, tub/shower, shower chair, walker, rolling  Supplies currently used at home: Oxygen Tubing/Supplies    Employment/Financial:  Employment Status: retired        Financial Concerns:             Does the patient's insurance plan have a 3 day qualifying hospital stay waiver?  No    Lifestyle & Psychosocial Needs:  Social Determinants of Health     Tobacco Use: Medium Risk (9/11/2023)    Patient History     Smoking Tobacco Use: Former     Smokeless Tobacco Use: Former     Passive Exposure: Not on file   Alcohol Use: Not on file   Financial Resource Strain: Not on file   Food Insecurity: Not on file    Transportation Needs: Not on file   Physical Activity: Not on file   Stress: Not on file   Social Connections: Not on file   Intimate Partner Violence: Not on file   Depression: At risk (7/12/2023)    PHQ-2     PHQ-2 Score: 5   Housing Stability: Not on file       Functional Status:  Prior to admission patient needed assistance: Family in room when meeting with patient, Daughter Leelee is nurse and main care giver.         Mental Health Status:    No concerns          Chemical Dependency Status:   No concerns                Values/Beliefs:  Spiritual, Cultural Beliefs, Jehovah's witness Practices, Values that affect care: no               Additional Information:  Writer met with patient and family members including daughter Leelee who is nurse and provides daily care/assistance to patient and spouse who has advanced Dementia.    Patient home oxygen provided by [employer], family can bring portable tank when discharge ready.    PT reinaldo pending, suggested Integrative for therapy to help with appetite, anxiety.    Writer attempted to called to [facility] notify patient notification([phone])  [facility] call.    Renata Mane CM

## 2023-09-18 PROBLEM — I50.23 HEART FAILURE, SYSTOLIC, ACUTE ON CHRONIC (H): Status: ACTIVE | Noted: 2023-01-01

## 2023-09-18 NOTE — PLAN OF CARE
Problem: Plan of Care - These are the overarching goals to be used throughout the patient stay.    Goal: Optimal Comfort and Wellbeing  Outcome: Progressing   Goal Outcome Evaluation:       Pt A&Ox4, VSS. Denied CP, SOB. Able to make needs known. CO 7/10 pain to lower back and buttocks. PRN Tramadol given with reported effectiveness and continued frequent turns to reduce pressure to painful area. Assist of 1 with cane. Used Purewick overnight. Appeared to sleep well between cares. Pt had a 7 beat run on V-Tach at 0541, non symptomatic, provider notified.

## 2023-09-18 NOTE — PLAN OF CARE
Problem: Plan of Care - These are the overarching goals to be used throughout the patient stay.    Goal: Absence of Hospital-Acquired Illness or Injury  Intervention: Identify and Manage Fall Risk  Recent Flowsheet Documentation  Taken 9/18/2023 0920 by Brittney Alatorre RN  Safety Promotion/Fall Prevention:   activity supervised   clutter free environment maintained   patient and family education   safety round/check completed   Goal Outcome Evaluation:    VSS. Tele Sinus Gal with BBB. Pt is alert and oriented. Calls appropriately for needs. SBA with gait belt and walker. Oxygen 3L, pt baseline 3L.  Pt tolerating 2G sodium diet. IV saline locked. Voids spontaneously. CO pain, gave PRN toradol, interventions were effective. Chair alarm on.

## 2023-09-18 NOTE — DISCHARGE INSTRUCTIONS
"WOC DISCHARGE INSTRUCTIONS:  LLE  Soak wounds with vashe moistened gauze for 5 minutes, pat dry  Apply nickel thick layer of medihoney gel to open wounds  Cover with nonstick gauze  Wrap with roll gauze to secure, do not use tape on bare skin    Pressure Injury Prevention (PIP) Plan:  If patient is declining pressure injury prevention interventions: Explore reason why and address patient's concerns, Educate on pressure injury risk and prevention intervention(s), If patient is still declining, document \"informed refusal\" , and Ensure Care team is aware ( provider, charge nurse, etc)  Mattress: Follow bed algorithm, reassess daily and order specialty mattress, if indicated.  HOB: Maintain at or below 30 degrees, unless contraindicated  Repositioning in bed: Every 1-2 hours , Left/right positioning; avoid supine, and Raise foot of bed prior to raising head of bed, to reduce patient sliding down (shear)  Heels: Keep elevated off mattress and Pillows under calves  Protective Dressing: None  Positioning Equipment: TAPS wedges (#[id number]) to help maintain 30 degree side lying position   Chair positioning: Chair cushion (#[id number])    If patient has a buttock pressure injury, or high risk for PI use chair cushion or SPS.  Moisture Management: Perineal cleansing /protection: Follow Incontinence Protocol, Clean and dry skin folds with bathing , and Moisturize dry skin  Under Devices: Inspect skin under all medical devices during skin inspection , Ensure tubes are stabilized without tension, and Ensure patient is not lying on medical devices or equipment when repositioned  Ask provider to discontinue device when no longer needed.    Recommend Follow Up for wound care at  [facility]  [address], [address]  [place] [address]  T: [phone]     Surgeon or Hospitalist Following:  Dereje Isaac MD    Care Manager:  If surgeon is following wound, please coordinate with Hospitalist to ask " surgeon if willing to sign off and allow clinic providers to follow.  Send referral with current WOC orders

## 2023-09-18 NOTE — PROGRESS NOTES
[facility]    Medicine Progress Note - Hospitalist Service    Date of Admission:  9/15/2023    Assessment & Plan   Irene León is a 78 year old female admitted with decompensated HFrEF.  She is clinically stable on her baseline oxygen requirement.  Mild crackles on exam supportive of ongoing volume overload.  Continue with diuresis.      # Decompensated HFrEF  - diuretics per cardiology    # Afib  - rivaroxaban    # Bilateral lower extremity wounds  - wound care followup    # COPD with chronic hypoxic respiratory failure on 3L    # Lung cancer    # CKD 4    Addendum:  Asked to evaluate for back lesions.  Patient has two mobile subcutaneous soft lesions on the back, one more superior is nickel-sized, tender to touch, and without erythema.  The more inferior lesion in oval shaped an appx 3-5 cm in length with associated localized erythema and tenderness to touch.  Nursing reported the patient's waste belt was rubbing this lesion.  Suspect these are lipomas and the inferior lesion is irritated from the belt.  Monitor clinically for progression to suggest an associated infection.       Diet: 2 Gram Sodium Diet    Hayes Catheter: Not present  Lines: None     Cardiac Monitoring: ACTIVE order. Indication: Acute decompensated heart failure (48 hours)  Code Status: Full Code      Clinically Significant Risk Factors                 # Acute Kidney Injury, unspecified: based on a >150% or 0.3 mg/dL increase in last creatinine compared to past 90 day average, will monitor renal function  # Hypertension: Noted on problem list  # Acute heart failure with reduced ejection fraction: last echo with EF <40% and receiving IV diuretics           # COPD: noted on problem list        Disposition Plan      Expected Discharge Date: 09/18/2023,  3:00 PM  Discharge Delays: IV Medication - consider oral or Home Infusion    Discharge Comments: IV diuretics, WOC to see.  Home at discharge          Dereje Isaac  MD  Hospitalist Service  [facility]  Securely message with [employer] (more info)  Text page via [employer] Paging/Directory   ______________________________________________________________________    Interval History   Denies SOB, chest pain/pressure, or abdominal pain.  Reports improvement in leg edema.    Physical Exam   Vital Signs: Temp: 97.6  F (36.4  C) Temp src: Oral BP: 119/58 Pulse: 60   Resp: 16 SpO2: 95 % O2 Device: Nasal cannula Oxygen Delivery: 3 LPM  Weight: 119 lbs 14.88 oz    Gen:  sitting comfortably in bed, nad  Neuro: alert, conversant  CV:  nl rate, regular rhythm, systolic murmur  Pulm:  no acute resp distress, crackles at left base  GI:  abdomen soft, NTTP  Ext:  difficult to assess lower ext edema due to pain in lower legs; bilateral leg wounds    Medical Decision Making             Data   Reviewed:  Na 137  K 3.5  BUN 30  Cr 2    WBC 9.6  Hgb 11  Plts 248

## 2023-09-18 NOTE — PROGRESS NOTES
"[facility]: Heart Failure Care Coordination   Heart Failure Education    Situation/Background:      RN CHRISTIAN provided heart failure education to Pt during admission.    Assessment:      Living situation: home with family    Barriers to Heart Failure follow-up: none     Medication management: independent      Intervention/Plan:      CM/HF education topics reviewed:  Low sodium: 2000 mg or less daily, meal choices and label reading   Fluid Restriction: 50-60oz   Daily weight monitoring and logging   Medication review and importance of compliance   Overview of C.O.R.E. clinic   Symptoms of HF to be reported to Core Team      Education materials provided:  Low sodium food and drink handout  Low sodium food product examples  Already prepared low salt meal delivery services handout  Guide to HF booklet     HF resources reviewed:  [employer]      Patient to follow up as scheduled.     Patient expressed understanding of above education/instructions and denied further questions at this time.    Pt eats out often and I not eating out, they do a lot of takeout - [employer]. We discussed foods to avoid - deli meat being the biggest one as this is something she eats quite often. Encouraged to cut back quite a bit on eating out/takeout. We discussed [employer] as an alternative. She states she was on a meal program for a while before but the meals were too much in abundance and she shares that there were some meals she didn't eat because \"if I don't like it I won't eat it.\" Encouraged her to try [employer] again if not already and if she doesn't like it, she does not have to continue with it. However, that does mean she must be more accountable for her own sodium tracking and meal preparation. She states understanding to that. She was not tracking liquids prior to this and will do so going forward.     Melva FIELDSOKEVIN. NATE  BSN    "

## 2023-09-18 NOTE — PLAN OF CARE
Goal Outcome Evaluation:      Plan of Care Reviewed With: patient    Overall Patient Progress: improvingOverall Patient Progress: improving    Outcome Evaluation: VSS on home 3L O2 via NC. Meds changed to PO. Voiding, BM this AM. WOC assessment complete. Hopeful to d/c tomorrow am.      Problem: Risk for Delirium  Goal: Improved Attention and Thought Clarity  Outcome: Progressing  Intervention: Maximize Cognitive Function  Recent Flowsheet Documentation  Taken 9/18/2023 1629 by Nannette Michele RN  Sensory Stimulation Regulation:   care clustered   lighting decreased   quiet environment promoted   television on  Reorientation Measures: clock in view     Problem: Heart Failure  Goal: Improved Oral Intake  Outcome: Progressing     Problem: Heart Failure  Goal: Fluid and Electrolyte Balance  Outcome: Progressing

## 2023-09-18 NOTE — CONSULTS
[facility] Nurse Inpatient Assessment     Consulted for: Left leg    Summary:     Patient History (according to provider note(s):      Irene León is a 78 year old female with past medical history significant for CHF with reduced ejection fraction, cardiomyopathy recent EF 6/23 at 20%, severe COPD, chronic home oxygen 3 L, minimal CAD, paroxysmal A-fib on Xarelto, CKD stage IV, lung cancer on immunotherapy presented to the emergency room with complaints of worsening lower extremity edema ongoing for the last week.  Son and daughter were at bedside and they helped with the history.  She had developed blisters on her left leg that were weeping.  Generalized body swelling and she could not wait her previous clots.  She has orthopnea.  Has been feeling very cold, denied any fevers any chills, increasing cough, chest pain.  No urinary complaints, denies any abdominal pain, nausea vomiting but had abdominal fullness.  Was seen in heart failure clinic on 9/11, Lasix dose was increased but point continued with the persistent swelling and subsequently presented to ED.  Not really compliant with a low-salt diet at home.  Most recently was hospitalized on 8/23 for CHF, COPD exacerbation.     In ED upon arrival vitally she was needing about 5 L of oxygen, lab work-up revealed elevated proBNP 9/6/2002, troponin T mildly elevated at 38, chest x-ray there has been increase in the patchy infiltrate in the left midlung, being admitted for CHF exacerbation, EKG showed known left bundle branch block, PVCs, prolonged QTc.  She was given IV Lasix and admitted.  So far she has 300 mL output.    Assessment:      Skin Injury Location: L leg      9/18 shin                                                            medial calf    Last photo: 9/18  Skin injury due to: Hematoma  Skin history and plan of care:   patient has skin changes indicating vascular insufficiency. Patient stated she has not been to a  "podiatrist or vascular team to look at blood flow.   Affected area:      Skin assessment: Blistering, Denudement, and Ecchymosis     Measurements (length x width x depth, in cm) Shin: 1.2  x 1  x  0.2 cm, medial calf: 1.5 x 1.5 x 0.2 cm     Color: purple     Temperature  cool     Drainage: small .      Color: sanguinous      Odor: none  Pain: moderate, Irritable or crying out at intervals, tension to hands, feet and body, and legs are very tender to touch , shooting  Pain interventions prior to dressing change: slow and gentle cares   Treatment goal: Heal  and Protection  STATUS: initial assessment  Supplies ordered: ordered medihoney, vashe      Spoke with patient about vascular services, will put information for vascular clinic in [facility]. Patient stated she wanted to discuss this with her cardiologist. Patient also spoke about redness to her buttocks but stated the mepilex dressings feel like they rip off her skin, will request a chair cushion for patient.     Treatment Plan:     LLE  Soak wounds with vashe moistened gauze for 5 minutes, pat dry  Apply nickel thick layer of medihoney gel to open wounds  Cover with nonstick gauze  Wrap with roll gauze to secure, do not use tape on bare skin    Pressure Injury Prevention (PIP) Plan:  If patient is declining pressure injury prevention interventions: Explore reason why and address patient's concerns, Educate on pressure injury risk and prevention intervention(s), If patient is still declining, document \"informed refusal\" , and Ensure Care team is aware ( provider, charge nurse, etc)  Mattress: Follow bed algorithm, reassess daily and order specialty mattress, if indicated.  HOB: Maintain at or below 30 degrees, unless contraindicated  Repositioning in bed: Every 1-2 hours , Left/right positioning; avoid supine, and Raise foot of bed prior to raising head of bed, to reduce patient sliding down (shear)  Heels: Keep elevated off mattress and Pillows under calves  Protective " Dressing: None  Positioning Equipment: TAPS wedges (#[id number]) to help maintain 30 degree side lying position   Chair positioning: Chair cushion (#[id number])    If patient has a buttock pressure injury, or high risk for PI use chair cushion or SPS.  Moisture Management: Perineal cleansing /protection: Follow Incontinence Protocol, Clean and dry skin folds with bathing , and Moisturize dry skin  Under Devices: Inspect skin under all medical devices during skin inspection , Ensure tubes are stabilized without tension, and Ensure patient is not lying on medical devices or equipment when repositioned  Ask provider to discontinue device when no longer needed.      Orders: Written    RECOMMEND PRIMARY TEAM ORDER:  vascular/wound outpatient consult  Education provided: importance of repositioning, plan of care, Moisture management, and Off-loading pressure  Discussed plan of care with: Patient and Nurse  WOC nurse follow-up plan: weekly  Notify WOC if wound(s) deteriorate.  Nursing to notify the Provider(s) and re-consult the WOC Nurse if new skin concern.    DATA:     Current support surface: Standard  Standard gel/foam mattress (IsoFlex, Atmos air, etc)  Containment of urine/stool: Incontinence Protocol  BMI: Body mass index is 24.18 kg/m .   Active diet order: Orders Placed This Encounter      2 Gram Sodium Diet     Output: I/O last 3 completed shifts:  In: 600 [P.O.:600]  Out: 1200 [Urine:1200]     Labs:   Recent Labs   Lab 09/18/23  0430   HGB 10.6*   WBC 9.6     Pressure injury risk assessment:   Sensory Perception: 3-->slightly limited  Moisture: 3-->occasionally moist  Activity: 3-->walks occasionally  Mobility: 3-->slightly limited  Nutrition: 3-->adequate  Friction and Shear: 2-->potential problem  Jasen Score: 17    NEYMAR Guillermo RN CWOCN  Pager no longer in use, please contact through [employer] group: [facility]

## 2023-09-18 NOTE — PROGRESS NOTES
09/18/23 0800   Appointment Info   Signing Clinician's Name / Credentials (PT) Ana Zamorano, PT, DPT   Living Environment   People in Home spouse   Current Living Arrangements house   Home Accessibility stairs to enter home;stairs within home   Number of Stairs, Main Entrance 2   Stair Railings, Main Entrance railing on left side (ascending)   Number of Stairs, Within Home, Primary seven   Self-Care   Usual Activity Tolerance moderate   Current Activity Tolerance fair   Equipment Currently Used at Home walker, rolling;cane, straight;wheelchair, manual  (use of walker and cane in the house at baseline; use of wheelchair outside of home for longer distances at baseline)   Fall history within last six months no   General Information   Onset of Illness/Injury or Date of Surgery 09/15/23   Referring Physician Dereje Isaac MD   Patient/Family Therapy Goals Statement (PT) Return to home   Pertinent History of Current Problem (include personal factors and/or comorbidities that impact the POC) Acute CHF, shortness of breath   Existing Precautions/Restrictions no known precautions/restrictions   Posture    Posture Comments Increased thoracic Kyphosis   Range of Motion (ROM)   ROM Comment Overall decreased d/t pain and fatigue   Strength (Manual Muscle Testing)   Strength Comments overall decreased d/t pain and fatigue   Bed Mobility   Bed Mobility rolling right;scooting/bridging;supine-sit;sit-supine   Scooting/Bridging [place] (Bed Mobility) contact guard   Supine-Sit [place] (Bed Mobility) minimum assist (75% patient effort)   Sit-Supine [place] (Bed Mobility) contact guard   Impairments Contributing to Impaired Bed Mobility pain;decreased ROM;decreased strength   Assistive Device (Bed Mobility) bed rails   Transfers   Transfers sit-stand transfer   Transfer Safety Concerns Noted losing balance backward   Impairments Contributing to Impaired Transfers impaired balance;pain;decreased ROM;decreased  strength   Sit-Stand Transfer   Sit-Stand [place] (Transfers) 2 person assist;moderate assist (50% patient effort)   Assistive Device (Sit-Stand Transfers) walker, front-wheeled   Gait/Stairs (Locomotion)   [place] Level (Gait) minimum assist (75% patient effort);2 person assist   Assistive Device (Gait) walker, front-wheeled   Distance in Feet 2   Distance in Feet (Gait) 5'   Pattern (Gait) step-to   Deviations/Abnormal Patterns (Gait) antalgic;gait speed decreased;sachin decreased   Clinical Impression   Criteria for Skilled Therapeutic Intervention Yes, treatment indicated   PT Diagnosis (PT) Impaired Functional Mobility   Influenced by the following impairments Weakness, pain, decreased ROM, impaired balance   Functional limitations due to impairments stairs, gait, transfers   Clinical Presentation (PT Evaluation Complexity) Stable/Uncomplicated   Clinical Presentation Rationale pt presents as medically diagnosed   Clinical Decision Making (Complexity) moderate complexity   Planned Therapy Interventions (PT) gait training;home exercise program;ROM (range of motion);stair training;strengthening;transfer training   Anticipated Equipment Needs at Discharge (PT) walker, rolling   Risk & Benefits of therapy have been explained evaluation/treatment results reviewed;patient   PT Total Evaluation Time   PT Eval, Moderate Complexity Minutes (86859) 10   Physical Therapy Goals   PT Frequency Daily   PT Predicted Duration/Target Date for Goal Attainment 09/22/23   PT Goals Gait;Stairs;Transfers   PT: Transfers Supervision/stand-by assist;Assistive device;Sit to/from stand   PT: Gait Supervision/stand-by assist;Rolling walker;25 feet   PT: Stairs Supervision/stand-by assist;Rail on left;Assistive device;7 stairs   Interventions   Interventions Quick Adds Gait Training;Therapeutic Activity   Therapeutic Activity   Therapeutic Activities: dynamic activities to improve functional performance Minutes (97248) 20    Symptoms Noted During/After Treatment Increased pain   Treatment Detail/Skilled Intervention Supine to sit: CGA to ocassional min A, Cueing for breathing for pain management and transfer techniques, use of bed rails for support. Increased time for transfer d/t significant pain. Sit to/from stand x2: Min A to Mod A x2 d/t loss of balance, increased reports of dizziness and knee buckling which worsened with standing. LOB x1 which resulted increased assist x2 to safely regain balance and return to sitting. Cueing for safety, hand placement, walker management and breathing techniques for pain. Increased time d/t pain and weakness.   Gait Training   Gait Training Minutes (63505) 10   Symptoms Noted During/After Treatment (Gait Training) increased pain   Treatment Detail/Skilled Intervention Increased verbal cueing for posture, walker management, breathing for pain management, and safety.   [place] Level (Gait Training) minimum assist (75% patient effort)   Physical Assistance Level (Gait Training) 2 person assist   Assistive Device (Gait Training) rolling walker   Pattern Analysis (Gait Training) swing-to gait   Gait Analysis Deviations decreased sachin;decreased step length;decreased stride length   Impairments (Gait Analysis/Training) pain;ROM decreased;strength decreased   PT Discharge Planning   PT Plan gait with chair follow, 1 step up, sit to stand transfers   PT Discharge Recommendation (DC Rec) (S)  Transitional Care Facility   PT Rationale for DC Rec Increased assistance required for transfers and gait, limited activity tolerance   PT Brief overview of current status Min A gait, CGA to min A supine to sit transfer, Sit to stand required mod A x2 to correct LOB d/t dizziness and knee buckling   Total Session Time   Timed Code Treatment Minutes 30   Total Session Time (sum of timed and untimed services) 40

## 2023-09-18 NOTE — PROGRESS NOTES
"  HEART CARE ENCOUNTER CONSULTATON NOTE      [facility]  [phone]      Assessment/Recommendations   Assessment:  1.  Acute on chronic heart failure with reduced ejection fraction improved with IV Lasix  2.  Nonischemic cardiomyopathy LVEF 20-25%  3.  Paroxysmal atrial fibrillation currently in sinus rhythm  4.  Nonobstructive coronary disease  5.  COPD  6.  Stage III chronic kidney disease  7.  Lung cancer  8.  Chronic hypoxic respiratory failure on 3 L of home O2  Plan:  1.  Stop IV Lasix and start on Lasix 80 mg p.o. twice daily  2.  Continue on Xarelto for anticoagulation and statin therapy  3.  Continue metoprolol  On discharge follow-up in our heart failure clinic       History of Present Illness/Subjective    Feeling better with improvement in breathing today.  Decreased lower extremity edema       Physical Examination  Review of Systems   Vitals: /58 (BP Location: Left arm, Cuff Size: Adult Small)   Pulse 60   Temp 97.6  F (36.4  C) (Oral)   Resp 16   Ht 1.5 m (4' 11.06\")   Wt 54.4 kg (119 lb 14.9 oz)   SpO2 95%   BMI 24.18 kg/m    BMI= Body mass index is 24.18 kg/m .  Wt Readings from Last 3 Encounters:   09/18/23 54.4 kg (119 lb 14.9 oz)   09/11/23 47.2 kg (104 lb)   08/29/23 48.5 kg (107 lb)       General Appearance:   no distress, normal body habitus   ENT/Mouth: membranes moist, no oral lesions or bleeding gums.      EYES:  no scleral icterus, normal conjunctivae   Neck: no carotid bruits or thyromegaly   Chest/Lungs:   lungs are clear to auscultation   Cardiovascular:   Regular. Normal first and second heart sounds with no murmur + edema bilaterally        Extremities: no cyanosis or clubbing   Skin: no xanthelasma, warm.    Neurologic: normal  bilateral, no tremors     Psychiatric: alert and oriented x3, calm        Please refer above for cardiac ROS details.        Medical History  Surgical History Family History Social History   Past Medical History: "   Diagnosis Date    ANATOLIY (acute kidney injury) (H)     Allergic rhinitis     Arrhythmia     Atrial fibrillation with RVR (H)     Bacteremia     Breast cancer (H) 2017    Cardiomyopathy (H)     Centrilobular emphysema (H)     CHF (congestive heart failure) (H)     Chronic kidney disease     CKD (chronic kidney disease)     COPD (chronic obstructive pulmonary disease) (H)     Coronary artery disease     Depression     Dysphagia     E. coli sepsis (H)     Factor 5 Leiden mutation, heterozygous (H)     GERD (gastroesophageal reflux disease)     Heart failure with reduced ejection fraction (H) 04/17/2023    Hx of radiation therapy 2017    Hyperlipidemia     Hypertension     Hypokalemia     Hypomagnesemia     Idiopathic cardiomyopathy (H)     Left hip pain 04/30/2014    OAB (overactive bladder)     Osteoporosis     Sacral insufficiency fracture     Sinusitis     Syncope     Urge incontinence     Vocal cord dysfunction      Past Surgical History:   Procedure Laterality Date    ARTHROPLASTY REVISION HIP Left     BIOPSY BREAST Right 2017    BLADDER SURGERY      bladder interstim with removal    CARDIAC CATHETERIZATION      CATARACT EXTRACTION Left 07/18/2017    IR ABDOMINAL AORTOGRAM  4/16/2003    IR AORTIC ARCH 4 VESSEL ANGIOGRAM  4/16/2003    IR MISCELLANEOUS PROCEDURE  4/16/2003    LUMPECTOMY BREAST Left 06/2017    x2    PICC DOUBLE LUMEN PLACEMENT  4/11/2022         PICC TRIPLE LUMEN PLACEMENT  4/7/2022         ZZC OPEN TX FEMORAL FRACTURE DISTAL MED/LAT CONDYLE Left 10/28/2015    Procedure: OPEN REDUCTION INTERNAL FIXATION LEFT  PROXIMAL FEMUR PERIPROSTHETIC FRACTURE;  Surgeon: Yovanny Albarran MD;  Location: [facility];  Service: Orthopedics     Family History   Problem Relation Age of Onset    Osteoporosis Other     Prostate Cancer Brother     Breast Cancer Maternal Aunt         age thought to be in her 70's-80's    Prostate Cancer Maternal Uncle         Social History     Socioeconomic History    Marital  status:      Spouse name: Not on file    Number of children: Not on file    Years of education: Not on file    Highest education level: Not on file   Occupational History    Not on file   Tobacco Use    Smoking status: Former     Types: Cigarettes     Quit date: 10/28/2007     Years since quitting: 15.9    Smokeless tobacco: Former     Quit date: 9/6/2004   Vaping Use    Vaping Use: Former   Substance and Sexual Activity    Alcohol use: No    Drug use: No    Sexual activity: Not on file   Other Topics Concern    Not on file   Social History Narrative     and lives in home with 3 flight of steps     Social Determinants of Health     Financial Resource Strain: Not on file   Food Insecurity: Not on file   Transportation Needs: Not on file   Physical Activity: Not on file   Stress: Not on file   Social Connections: Not on file   Intimate Partner Violence: Not on file   Housing Stability: Not on file           Medications  Allergies   No current outpatient medications on file.       Allergies   Allergen Reactions    Sulfa (Sulfonamide Antibiotics) [Sulfa Antibiotics] Rash     Headaches and dizziness.    Homeopathic Drugs [External Allergen Needs Reconciliation - See Comment] Unknown     runny nose    Mold Extracts [Molds & Smuts] Unknown    Morphine (Pf) [Morphine] Unknown     hallucinate    Lisinopril Itching, Cough and Unknown     cough    Sulfacetamide Sodium [Sulfacetamide] Rash          Lab Results    Chemistry/lipid CBC Cardiac Enzymes/BNP/TSH/INR   No results for input(s): CHOL, HDL, LDL, TRIG, CHOLHDLRATIO in the last 97481 hours.  No results for input(s): LDL in the last 03047 hours.  Recent Labs   Lab Test 09/18/23  0430      POTASSIUM 3.5   CHLORIDE 99   CO2 31*   *   BUN 29.8*   CR 2.03*   GFRESTIMATED 25*   MESSI 8.0*     Recent Labs   Lab Test 09/18/23  0430 09/17/23  0652 09/15/23  1953   CR 2.03* 1.63* 1.45*     No results for input(s): A1C in the last 77729 hours.       Recent  Labs   Lab Test 09/18/23  0430   WBC 9.6   HGB 10.6*   HCT 34.0*   MCV 93        Recent Labs   Lab Test 09/18/23  0430 09/16/23  1353 09/15/23  2118   HGB 10.6* 12.3 11.7    Recent Labs   Lab Test 08/27/23  0608 08/27/23  0101 07/03/23  1125   TROPONINI 0.07 0.07 0.04     Recent Labs   Lab Test 09/15/23  1953 08/27/23  0608 08/27/23  0101 07/03/23  1125   BNP  --  588* 456* 200*   NTBNP 9,602*  --   --   --      No results for input(s): TSH in the last 91529 hours.  Recent Labs   Lab Test 08/27/23  0101 06/17/23  1217 02/20/21  2040   INR 1.05 1.28* 1.16*        Sahara Farfan MD

## 2023-09-19 NOTE — PLAN OF CARE
[id number]: A/ox4. VSS on baseline 3L O2. Tele; SR w/BBB.  C/o back pain-gave prn tramadol. LS dim. LU. Diuretic changed to PO yesterday. Up to bathroom with assist of 1 with walker/gb. No acute events overnight.       Problem: Plan of Care - These are the overarching goals to be used throughout the patient stay.    Goal: Optimal Comfort and Wellbeing  Outcome: Progressing     Problem: Heart Failure  Goal: Optimal Coping  Outcome: Progressing     Problem: Gas Exchange Impaired  Goal: Optimal Gas Exchange  Outcome: Progressing

## 2023-09-19 NOTE — TELEPHONE ENCOUNTER
----- Message from Sahara Farfan MD sent at 9/19/2023 11:59 AM CDT -----  She needs follow up in hf clinic

## 2023-09-19 NOTE — PROGRESS NOTES
[facility] [place]    Medicine Progress Note - Hospitalist Service    Date of Admission:  9/15/2023    Assessment & Plan   Irene León is a 78 year old female admitted with decompensated HFrEF.  She is clinically stable, on baseline oxygen.    # Decompensated HFrEF  - diuretics per cardiology     # Afib  - rivaroxaban    # Suspected lipomas of back  - outpatient followup, provided anticipatory guidance to patient to monitor     # Bilateral lower extremity wounds  - wound care followup    # Hyperglycemia  - outpatient followup     # COPD with chronic hypoxic respiratory failure on 3L     # Lung cancer     # CKD 4          Diet: 2 Gram Sodium Diet      Hayes Catheter: Not present  Lines: None     Cardiac Monitoring: ACTIVE order. Indication: Acute decompensated heart failure (48 hours)  Code Status: Full Code      Clinically Significant Risk Factors                 # Acute Kidney Injury, unspecified: based on a >150% or 0.3 mg/dL increase in last creatinine compared to past 90 day average, will monitor renal function  # Hypertension: Noted on problem list  # Acute heart failure with reduced ejection fraction: last echo with EF <40% and receiving IV diuretics           # COPD: noted on problem list        Disposition Plan      Expected Discharge Date: 09/20/2023,  3:00 PM  Discharge Delays: IV Medication - consider oral or Home Infusion    Discharge Comments: IV diuretics.  Home at discharge          Dereje Isaac MD  Hospitalist Service  [facility] [place]  Securely message with [employer] (more info)  Text page via [employer] Paging/Directory   ______________________________________________________________________    Interval History   Patient reports her breathing is good.  Denies chest pain/pressure.    Physical Exam   Vital Signs: Temp: 97.5  F (36.4  C) Temp src: Oral BP: 128/60 Pulse: 65   Resp: 19 SpO2: 92 % O2 Device: Nasal cannula Oxygen Delivery: 3 LPM  Weight: 113 lbs 1.54  oz    Gen:  sitting in chair in no acute distress  Neuro: alert, conversant  CV:  nl rate, regular rhythm  Pulm:  no acute resp distress, faint crackles at left base  GI: abdomen soft, NTTP  Skin:  mild lower extremity pitting edema; scattered wounds over bilateral lower extremity; two subcutaneous lesions on back without gross changes on exam compared to yesterday; lower lesion has localized erythema and both lesions are TTP    Medical Decision Making             Data   Reviewed:  K 3.5  Mg 2.3

## 2023-09-19 NOTE — PROGRESS NOTES
Care Management Discharge Note    Discharge Date: 09/19/2023       Discharge Disposition: Home    Discharge Services: None    Discharge DME: Oxygen    Discharge Transportation: family or friend will provide    Private pay costs discussed: Not applicable    Does the patient's insurance plan have a 3 day qualifying hospital stay waiver?  No      Education Provided on the Discharge Plan: Yes  Persons Notified of Discharge Plans:  patient  Patient/Family in Agreement with the Plan: yes      Additional Information:  Patient declined TCU placement and wishes to return home to spouse and dog.    Daughter, Leelee, will transport patient and bring portable oxygen tank.  Leelee also provides wound care to patient janet LE.    Renata Mane CM

## 2023-09-19 NOTE — PROGRESS NOTES
Occupational Therapy Discharge Summary    Reason for therapy discharge:    Discharged to home.    Progress towards therapy goal(s). See goals on Care Plan in [facility] electronic health record for goal details.  Goals met    Therapy recommendation(s):    No further therapy is recommended.

## 2023-09-19 NOTE — PLAN OF CARE
Goal Outcome Evaluation:    VSS. Pt is alert and oriented. Calls appropriately for needs. SBA with walker.Oxygen 3L. Pt tolerating low sodium diet. IV removed. Voids spontaneously. Denies pain.    Discharge instructions gone over with pt. All questions and concerns answered. Pt discharging to home with daughter eLelee.

## 2023-09-19 NOTE — PROGRESS NOTES
Care Management Follow Up    Length of Stay (days): 4    Expected Discharge Date: 09/20/2023     Concerns to be Addressed:    Discharge planning   Patient plan of care discussed at interdisciplinary rounds: Yes    Anticipated Discharge Disposition:  home with family support     Anticipated Discharge Services:  TBD  Anticipated Discharge DME:  TBD      Private pay costs discussed: Family to transport home.    Additional Information:  PT recommending TCU at discharge, writer met with patient who declines TCU at discharge.  Patient sitting in chair looking brighter today and reports her daughter Leelee provides dressing changes to legs and family will transport home.    Patient also confirms takes po every day chemo and plans to continue this.  This would make make TCU difficult if patient changes mind.    Renata Mane CM

## 2023-09-19 NOTE — PLAN OF CARE
Patient Aox4, pleasant, cooperative. Denies pain. Vitally stable on 2-3L oxygen via nc. SBA w/w. Discharge in formation reviewed with patient and her daughter to their satisfaction. All questions answered. Patient discharged in stable condition via a private vehicle with her daughter.

## 2023-09-19 NOTE — PROGRESS NOTES
"  HEART CARE ENCOUNTER CONSULTATON NOTE      [facility]  [phone]      Assessment/Recommendations   Assessment:  1.  Acute on chronic heart failure with reduced ejection fraction improved with IV Lasix. Changed to lasix 80 PO twice daily yesterday.  Previously on 80 in am and 40 in pm prior to admission  2.  Nonischemic cardiomyopathy LVEF 20-25%  3.  Paroxysmal atrial fibrillation currently in sinus rhythm  4.  Nonobstructive coronary disease  5.  COPD  6.  Stage III chronic kidney disease  7.  Lung cancer  8.  Chronic hypoxic respiratory failure on 3 L of home O2  Plan:  1.  Continue on Lasix 80 mg p.o. twice daily  2.  Continue on Xarelto for anticoagulation and statin therapy  3.  Continue metoprolol  On discharge follow-up in our heart failure clinic  No further cardiac recommendations       History of Present Illness/Subjective    Feeling better with improvement in breathing     Physical Examination  Review of Systems   Vitals: /60 (BP Location: Left arm)   Pulse 65   Temp 97.5  F (36.4  C) (Oral)   Resp 19   Ht 1.5 m (4' 11.06\")   Wt 51.3 kg (113 lb 1.5 oz)   SpO2 92%   BMI 22.80 kg/m    BMI= Body mass index is 22.8 kg/m .  Wt Readings from Last 3 Encounters:   09/19/23 51.3 kg (113 lb 1.5 oz)   09/11/23 47.2 kg (104 lb)   08/29/23 48.5 kg (107 lb)       General Appearance:   no distress, normal body habitus   ENT/Mouth: membranes moist, no oral lesions or bleeding gums.      EYES:  no scleral icterus, normal conjunctivae   Neck: no carotid bruits or thyromegaly   Chest/Lungs:   lungs are clear to auscultation   Cardiovascular:   Regular. Normal first and second heart sounds with no murmur no edema bilaterally    Abdomen:  bowel sounds are present   Extremities: no cyanosis or clubbing   Skin: no xanthelasma, warm.    Neurologic: normal  bilateral, no tremors     Psychiatric: alert and oriented x3, calm        Please refer above for cardiac ROS details.        Medical " History  Surgical History Family History Social History   Past Medical History:   Diagnosis Date    ANATOLIY (acute kidney injury) (H)     Allergic rhinitis     Arrhythmia     Atrial fibrillation with RVR (H)     Bacteremia     Breast cancer (H) 2017    Cardiomyopathy (H)     Centrilobular emphysema (H)     CHF (congestive heart failure) (H)     Chronic kidney disease     CKD (chronic kidney disease)     COPD (chronic obstructive pulmonary disease) (H)     Coronary artery disease     Depression     Dysphagia     E. coli sepsis (H)     Factor 5 Leiden mutation, heterozygous (H)     GERD (gastroesophageal reflux disease)     Heart failure with reduced ejection fraction (H) 04/17/2023    Hx of radiation therapy 2017    Hyperlipidemia     Hypertension     Hypokalemia     Hypomagnesemia     Idiopathic cardiomyopathy (H)     Left hip pain 04/30/2014    OAB (overactive bladder)     Osteoporosis     Sacral insufficiency fracture     Sinusitis     Syncope     Urge incontinence     Vocal cord dysfunction      Past Surgical History:   Procedure Laterality Date    ARTHROPLASTY REVISION HIP Left     BIOPSY BREAST Right 2017    BLADDER SURGERY      bladder interstim with removal    CARDIAC CATHETERIZATION      CATARACT EXTRACTION Left 07/18/2017    IR ABDOMINAL AORTOGRAM  4/16/2003    IR AORTIC ARCH 4 VESSEL ANGIOGRAM  4/16/2003    IR MISCELLANEOUS PROCEDURE  4/16/2003    LUMPECTOMY BREAST Left 06/2017    x2    PICC DOUBLE LUMEN PLACEMENT  4/11/2022         PICC TRIPLE LUMEN PLACEMENT  4/7/2022         ZZC OPEN TX FEMORAL FRACTURE DISTAL MED/LAT CONDYLE Left 10/28/2015    Procedure: OPEN REDUCTION INTERNAL FIXATION LEFT  PROXIMAL FEMUR PERIPROSTHETIC FRACTURE;  Surgeon: Yovanny Albarran MD;  Location: [facility];  Service: Orthopedics     Family History   Problem Relation Age of Onset    Osteoporosis Other     Prostate Cancer Brother     Breast Cancer Maternal Aunt         age thought to be in her 70's-80's    Prostate Cancer  Maternal Uncle         Social History     Socioeconomic History    Marital status:      Spouse name: Not on file    Number of children: Not on file    Years of education: Not on file    Highest education level: Not on file   Occupational History    Not on file   Tobacco Use    Smoking status: Former     Types: Cigarettes     Quit date: 10/28/2007     Years since quitting: 15.9    Smokeless tobacco: Former     Quit date: 9/6/2004   Vaping Use    Vaping Use: Former   Substance and Sexual Activity    Alcohol use: No    Drug use: No    Sexual activity: Not on file   Other Topics Concern    Not on file   Social History Narrative     and lives in home with 3 flight of steps     Social Determinants of Health     Financial Resource Strain: Not on file   Food Insecurity: Not on file   Transportation Needs: Not on file   Physical Activity: Not on file   Stress: Not on file   Social Connections: Not on file   Intimate Partner Violence: Not on file   Housing Stability: Not on file           Medications  Allergies   Current Outpatient Medications   Medication Sig Dispense Refill    cetirizine (ZYRTEC) 5 MG tablet Take 1 tablet (5 mg) by mouth daily as needed for allergies      famotidine (PEPCID) 20 MG tablet Take 1 tablet (20 mg) by mouth every other day 90 tablet 0    furosemide (LASIX) 80 MG tablet Take 1 tablet (80 mg) by mouth 2 times daily 28 tablet 0    mirabegron (MYRBETRIQ) 25 MG 24 hr tablet Take 1 tablet (25 mg) by mouth daily 30 tablet 0    spironolactone (ALDACTONE) 25 MG tablet Take 1 tablet (25 mg) by mouth daily 30 tablet 0       Allergies   Allergen Reactions    Sulfa (Sulfonamide Antibiotics) [Sulfa Antibiotics] Rash     Headaches and dizziness.    Homeopathic Drugs [External Allergen Needs Reconciliation - See Comment] Unknown     runny nose    Mold Extracts [Molds & Smuts] Unknown    Morphine (Pf) [Morphine] Unknown     hallucinate    Lisinopril Itching, Cough and Unknown     cough     Sulfacetamide Sodium [Sulfacetamide] Rash          Lab Results    Chemistry/lipid CBC Cardiac Enzymes/BNP/TSH/INR   No results for input(s): CHOL, HDL, LDL, TRIG, CHOLHDLRATIO in the last 20519 hours.  No results for input(s): LDL in the last 80225 hours.  Recent Labs   Lab Test 09/19/23  0507 09/18/23  0430   NA  --  137   POTASSIUM 3.5 3.5   CHLORIDE  --  99   CO2  --  31*   GLC  --  125*   BUN  --  29.8*   CR  --  2.03*   GFRESTIMATED  --  25*   MESSI  --  8.0*     Recent Labs   Lab Test 09/18/23  0430 09/17/23  0652 09/15/23  1953   CR 2.03* 1.63* 1.45*     No results for input(s): A1C in the last 71455 hours.       Recent Labs   Lab Test 09/18/23  0430   WBC 9.6   HGB 10.6*   HCT 34.0*   MCV 93        Recent Labs   Lab Test 09/18/23  0430 09/16/23  1353 09/15/23  2118   HGB 10.6* 12.3 11.7    Recent Labs   Lab Test 08/27/23  0608 08/27/23  0101 07/03/23  1125   TROPONINI 0.07 0.07 0.04     Recent Labs   Lab Test 09/15/23  1953 08/27/23  0608 08/27/23  0101 07/03/23  1125   BNP  --  588* 456* 200*   NTBNP 9,602*  --   --   --      No results for input(s): TSH in the last 82247 hours.  Recent Labs   Lab Test 08/27/23  0101 06/17/23  1217 02/20/21  2040   INR 1.05 1.28* 1.16*        Sahara Farfan MD

## 2023-09-20 NOTE — PROGRESS NOTES
Physical Therapy Discharge Summary    Reason for therapy discharge:    Discharged to home.    Progress towards therapy goal(s). See goals on Care Plan in [facility] electronic health record for goal details.  Goals not met.  Barriers to achieving goals:   discharge from facility.    Therapy recommendation(s):    No further therapy recommended at this time

## 2023-09-21 NOTE — LETTER
"    9/21/2023         RE: Irene León  [address] [facility] [address]        Dear Colleague,    Thank you for referring your patient, Irene León, to the [facility]. Please see a copy of my visit note below.    Oncology Rooming Note    September 21, 2023 10:10 AM   Irene León is a 78 year old female who presents for:    Chief Complaint   Patient presents with     Oncology Clinic Visit     Malignant neoplasm of upper lobe of right lung,  Invasive ductal carcinoma of breast, female, left     Initial Vitals: Ht 1.5 m (4' 11.06\")   BMI 22.80 kg/m   Estimated body mass index is 22.8 kg/m  as calculated from the following:    Height as of this encounter: 1.5 m (4' 11.06\").    Weight as of 9/19/23: 51.3 kg (113 lb 1.5 oz). Body surface area is 1.46 meters squared.  Data Unavailable Comment: Data Unavailable   No LMP recorded. Patient is postmenopausal.  Allergies reviewed: Yes  Medications reviewed: Yes    Medications: Medication refills not needed today.  Pharmacy name entered into [employer]:    [id number] - [place], [place] - [address] [facility]  AT [facility]  [facility] - [place], [place] - [address] [place] [address] - [place], [place] - [address] [place] [address] [facility] - [place], [place] - [address]     Clinical concerns: 1 month follow up with labs. Hospital follow up      Alia Anne MA              [facility] Hematology and Oncology Progress Note    Patient: Irene León  MRN: [id number]  Date of Service: Sep 21, 2023       Reason for Visit    Chief Complaint   Patient presents with     Oncology Clinic Visit     Malignant neoplasm of upper lobe of right lung,  Invasive ductal carcinoma of breast, female, left       Assessment and Plan     Cancer Staging   Invasive ductal carcinoma of breast, female, left (H)  Staging form: Breast, AJCC 8th Edition  - Pathologic " stage from 6/20/2017: Stage IA (pT1b, pN0(sn), cM0, G1, ER+, NV+, HER2-, Oncotype DX score: 6) - Signed by Devon Suarez MD on 7/4/2022      ECOG Performance    3 - Confined to bed/chair > 50% of time, capable of limited self care     Pain  Pain Score: Severe Pain (7)  Pain Loc: Foot (Feet and back)      #.   Right upper lobe pulmonary lung cancer with adjacent satellite nodule and thoracic lymph node metastasis most consistent with locally advanced lung cancer.  There is no evidence of distant metastasis. KRAS G12C mutated.  #. Delirium in the evening   #.  History of invasive ductal carcinoma of the left breast.   Stage IA (pT1b, pN0 (sn),cM0). grade 1, associated DCIS, ER/NV positive, HER-2 negative, s/p left breast lumpectomy and left axillary sentinel lymph node biopsy. Right breast atypical ductal hyperplasia s/p right breast excisional biopsy on 6/20/2017.  She has several comorbidities including nonischemic cardiomyopathy (EF 40% in 3/17), osteoporosis on treatment with oral bisphosphonate, factor V Leiden mutation (details unknown). Started Tamoxifen in 12/2017.  #.  Severe COPD and chronic hypoxic respiratory failure, on supplemental oxygen- On 3 L at rest, 5-6 L with exertion.  #.  CHF with reduced EF of 20-25%  #.  CKD - 3b     She has gradual but significant clinical decline related to her cardiac and pulmonary issues.  In addition, since she started sotorasib, she appears to decline more for her family.  She had confusion/sundowning/delirium in the evening.  It could be related to medication effect, are fatigue etc.  However it appears started after starting sotorasib.  She also has pedal edema which could be related to her fluid overload from her impaired heart function.  I also reviewed the sotorasib can cause a small risk of edema.  Fortunately, she no longer has diarrhea.  I reviewed her labs today.  CBC was completely normal.  She has normal electrolyte profile.  Renal function has been  fluctuating with the diuretic use.   I discussed that goals of care is palliative.  Based on her multiple medical issues and new symptoms of encephalopathy, I recommended to hold sotorasib at this point.  She will continue to work on her cardiac and pulmonary function.  They are planning to get home health care for her.    Plan:  - hold sotorasib.   -If the neurologic symptoms continue or worsening, will obtain MRI brain.  - follow up in about 3 weeks to reassess for possible resuming sotorasib, perhaps with reduced dose.  - advised her to follow up with her cardiologist regarding fluid management.     Encounter Diagnoses:    Problem List Items Addressed This Visit          Oncology Diagnoses    Malignant neoplasm of upper lobe of right lung (H) - Primary    Relevant Orders    CBC with platelets and differential (Completed)    CBC with Platelets & Differential    Comprehensive metabolic panel     Other Visit Diagnoses       Delirium                  CC: Pankaj Montanez MD   ______________________________________________________________________________  Diagnosis  6/20/17  Stage IA (pT1b, pN0(sn), cM0) invasive ductal carcinoma of the left breast  - ER (high positive, 98%), OR (high positive, 97%) HER2 (negative, score of 1+ by IHC)  - Franklin grade 1  - Tumor size: <1 cm  - Margin-negative on final margin with reexcision for invasive carcinoma but close margin for DCIS of 0.2 cm from new medial margin.    - 1 sentinel lymph node removed, negative for carcinoma  - associated DCIS  - Right breast atypical ductal hyperplasia.    - Oncotype DX recurrence score 6 : 10 year risk of recurrence with 5 year of tamoxifen is 5%.    Therapy to date:  6/20/17 -right breast excisional biopsy AND left breast lumpectomy, left axillary sentinel lymph node biopsy  6/30/17-reexcision lumpectomy of the left breast  9/8/17-completed adjuvant radiation to the left breast 4256 cGy/16  12/6/17-8/11/2023- adjuvant endocrine therapy with  tamoxifen.    8/2023-right upper lobe lung cancer with adjacent satellite nodules and thoracic lymph node metastasis   It was diagnosed after a progressively enlarging right upper lobe lung mass with satellite nodules.  Due to her severe COPD/emphysema, chronic hypoxic respiratory failure and increased risk of complications, lung or mediastinal lymph node biopsy was unable to obtain.  They are no other alternative site for biopsy.    Radiation is absolutely contraindicated with her pulmonary condition.  Her current performance status is not adequate for chemotherapy either.  She has strong desire to look into potential treatment option with the goal of palliation including prolonging life expectancy. [id number] test was completed.  It showed KRAS G12C, TP53 mutations.     9/1/2023-started sotorasib 960 mg daily.  Held on 9/22/2023 due to confusion/delirium.    Comorbidities  #.  Mild hypoxia and chronic cough.   She is closely follow with pulmonologist at [facility].    #. Hypertension, controlled.  #.  Osteoporosis  #.  COPD  #.  Idiopathic cardiomyopathy- LVEF 40% in October 2018, no change from prior.      History of Present Illness    Ms. Irene León presented today accompanied by her son, Yovanny. Her daughter, Leelee joined the visit by phone.     She reported recurrence of swelling in her feet a day after discharge from the hospital.  She was feeling much better at the time of discharge.  She had diarrhea related to sotorasib at the beginning.  Then she did not notice any diarrhea.  The family reported that she was confused and she called her daughter last night leaving a voicemail that she was confused which she was.  Stephanie recall when she said.  She was mentally clear in the morning.    She was rehospitalized with CHF exacerbation and discharged 2 days ago.  Her diuretic dose was increased by Lasix 40 mg to 160 mg daily now.  She is home with her son and daughter checked on her. She lives with her  who  has dementia. They are looking into home health services a few days a week.     Review of systems  Apart from describing in HPI, the remainder of comprehensive ROS was negative.    Past History    Past Medical History:   Diagnosis Date     ANATOLIY (acute kidney injury) (H)      Allergic rhinitis      Arrhythmia      Atrial fibrillation with RVR (H)      Bacteremia      Breast cancer (H) 2017     Cardiomyopathy (H)      Centrilobular emphysema (H)      CHF (congestive heart failure) (H)      Chronic kidney disease      CKD (chronic kidney disease)      COPD (chronic obstructive pulmonary disease) (H)      Coronary artery disease      Depression      Dysphagia      E. coli sepsis (H)      Factor 5 Leiden mutation, heterozygous (H)      GERD (gastroesophageal reflux disease)      Heart failure with reduced ejection fraction (H) 04/17/2023     Hx of radiation therapy 2017     Hyperlipidemia      Hypertension      Hypokalemia      Hypomagnesemia      Idiopathic cardiomyopathy (H)      Left hip pain 04/30/2014     OAB (overactive bladder)      Osteoporosis      Sacral insufficiency fracture      Sinusitis      Syncope      Urge incontinence      Vocal cord dysfunction        Past Surgical History:   Procedure Laterality Date     ARTHROPLASTY REVISION HIP Left      BIOPSY BREAST Right 2017     BLADDER SURGERY      bladder interstim with removal     CARDIAC CATHETERIZATION       CATARACT EXTRACTION Left 07/18/2017     IR ABDOMINAL AORTOGRAM  4/16/2003     IR AORTIC ARCH 4 VESSEL ANGIOGRAM  4/16/2003     IR MISCELLANEOUS PROCEDURE  4/16/2003     LUMPECTOMY BREAST Left 06/2017    x2     PICC DOUBLE LUMEN PLACEMENT  4/11/2022          PICC TRIPLE LUMEN PLACEMENT  4/7/2022          ZZC OPEN TX FEMORAL FRACTURE DISTAL MED/LAT CONDYLE Left 10/28/2015    Procedure: OPEN REDUCTION INTERNAL FIXATION LEFT  PROXIMAL FEMUR PERIPROSTHETIC FRACTURE;  Surgeon: Yovanny Albarran MD;  Location: [facility];  Service: Orthopedics  "      Physical Exam    /58 (BP Location: Left arm, Patient Position: Sitting, Cuff Size: Adult Regular)   Pulse 75   Ht 1.5 m (4' 11.06\")   SpO2 95%   BMI 22.80 kg/m      General: alert, awake, not in acute distress however, she looks very tired.  Frail.  She wears 3 L oxygen by nasal cannula.  She came in a wheelchair.  HEENT: Head: Normal, normocephalic, atraumatic.  Eye: Normal external eye, conjunctiva, lids cornea, CATINA.  Pharynx: Normal buccal mucosa. Normal pharynx.  Neck / Thyroid: Supple, no masses, nodes, nodules or enlargement.  Lymphatics: No abnormally enlarged lymph nodes.  Chest: Normal chest wall and respirations. Clear to auscultation.  Heart: S1 S2 RRR.  3+ pedal edema.  No edema in the lower extremities.  Abdomen: abdomen is soft without significant tenderness, masses, organomegaly or guarding  Extremities: normal strength, tone, and muscle mass  Skin: normal. no rash or abnormalities  CNS: non focal.  Oriented x3.     Lab Results    Recent Results (from the past 168 hour(s))   Basic metabolic panel   Result Value Ref Range    Sodium 141 136 - 145 mmol/L    Potassium 3.9 3.4 - 5.3 mmol/L    Chloride 105 98 - 107 mmol/L    Carbon Dioxide (CO2) 23 22 - 29 mmol/L    Anion Gap 13 7 - 15 mmol/L    Urea Nitrogen 13.8 8.0 - 23.0 mg/dL    Creatinine 1.45 (H) 0.51 - 0.95 mg/dL    Calcium 8.9 8.8 - 10.2 mg/dL    Glucose 101 (H) 70 - 99 mg/dL    GFR Estimate 37 (L) >60 mL/min/1.73m2   N terminal pro BNP outpatient   Result Value Ref Range    N Terminal Pro BNP Outpatient 9,602 (H) 0 - 1,800 pg/mL   Troponin T, High Sensitivity (now)   Result Value Ref Range    Troponin T, High Sensitivity 38 (H) <=14 ng/L   Extra Blue Top Tube   Result Value Ref Range    Hold Specimen JIC    Extra Red Top Tube   Result Value Ref Range    Hold Specimen JIC    Magnesium   Result Value Ref Range    Magnesium 2.3 1.7 - 2.3 mg/dL   Procalcitonin   Result Value Ref Range    Procalcitonin 0.06 (H) <0.05 ng/mL   CRP " inflammation   Result Value Ref Range    CRP Inflammation 38.00 (H) <5.00 mg/L   ECG 12-LEAD WITH MUSE (LHE)   Result Value Ref Range    Systolic Blood Pressure  mmHg    Diastolic Blood Pressure  mmHg    Ventricular Rate 95 BPM    Atrial Rate 95 BPM    RI Interval 122 ms    QRS Duration 126 ms     ms    QTc 510 ms    P Axis 75 degrees    R AXIS 10 degrees    T Axis 116 degrees    Interpretation ECG       Sinus rhythm with sinus arrhythmia with occasional Premature ventricular complexes  Possible Left atrial enlargement  Left bundle branch block  Abnormal ECG  When compared with ECG of 27-AUG-2023 00:33,  Premature ventricular complexes are now Present  Premature supraventricular complexes are no longer Present  QRS axis Shifted right  Confirmed by SEE ED PROVIDER NOTE FOR, ECG INTERPRETATION ([id number]),  VEL NAIR ([id number]) on 9/15/2023 8:19:37 PM     CBC with platelets and differential   Result Value Ref Range    WBC Count 8.4 4.0 - 11.0 10e3/uL    RBC Count 4.12 3.80 - 5.20 10e6/uL    Hemoglobin 11.7 11.7 - 15.7 g/dL    Hematocrit 37.9 35.0 - 47.0 %    MCV 92 78 - 100 fL    MCH 28.4 26.5 - 33.0 pg    MCHC 30.9 (L) 31.5 - 36.5 g/dL    RDW 14.5 10.0 - 15.0 %    Platelet Count 233 150 - 450 10e3/uL    % Neutrophils 68 %    % Lymphocytes 15 %    % Monocytes 12 %    % Eosinophils 3 %    % Basophils 1 %    % Immature Granulocytes 1 %    NRBCs per 100 WBC 0 <1 /100    Absolute Neutrophils 5.9 1.6 - 8.3 10e3/uL    Absolute Lymphocytes 1.2 0.8 - 5.3 10e3/uL    Absolute Monocytes 1.0 0.0 - 1.3 10e3/uL    Absolute Eosinophils 0.2 0.0 - 0.7 10e3/uL    Absolute Basophils 0.0 0.0 - 0.2 10e3/uL    Absolute Immature Granulocytes 0.0 <=0.4 10e3/uL    Absolute NRBCs 0.0 10e3/uL   Lactic acid whole blood   Result Value Ref Range    Lactic Acid 0.7 0.7 - 2.0 mmol/L   Troponin T, High Sensitivity   Result Value Ref Range    Troponin T, High Sensitivity 50 (H) <=14 ng/L   Extra Blue Top Tube   Result Value Ref Range    Hold  Specimen JIC    Extra Purple Top Tube   Result Value Ref Range    Hold Specimen JIC    Troponin T, High Sensitivity   Result Value Ref Range    Troponin T, High Sensitivity 44 (H) <=14 ng/L   Troponin T, High Sensitivity   Result Value Ref Range    Troponin T, High Sensitivity 44 (H) <=14 ng/L   CBC with platelets   Result Value Ref Range    WBC Count 9.3 4.0 - 11.0 10e3/uL    RBC Count 4.35 3.80 - 5.20 10e6/uL    Hemoglobin 12.3 11.7 - 15.7 g/dL    Hematocrit 40.5 35.0 - 47.0 %    MCV 93 78 - 100 fL    MCH 28.3 26.5 - 33.0 pg    MCHC 30.4 (L) 31.5 - 36.5 g/dL    RDW 14.6 10.0 - 15.0 %    Platelet Count 258 150 - 450 10e3/uL   CRP inflammation   Result Value Ref Range    CRP Inflammation 47.00 (H) <5.00 mg/L   Procalcitonin   Result Value Ref Range    Procalcitonin 0.07 (H) <0.05 ng/mL   Potassium   Result Value Ref Range    Potassium 3.5 3.4 - 5.3 mmol/L   Magnesium   Result Value Ref Range    Magnesium 2.1 1.7 - 2.3 mg/dL   Basic metabolic panel   Result Value Ref Range    Sodium 140 136 - 145 mmol/L    Potassium 3.5 3.4 - 5.3 mmol/L    Chloride 99 98 - 107 mmol/L    Carbon Dioxide (CO2) 30 (H) 22 - 29 mmol/L    Anion Gap 11 7 - 15 mmol/L    Urea Nitrogen 18.5 8.0 - 23.0 mg/dL    Creatinine 1.63 (H) 0.51 - 0.95 mg/dL    Calcium 8.9 8.8 - 10.2 mg/dL    Glucose 98 70 - 99 mg/dL    GFR Estimate 32 (L) >60 mL/min/1.73m2   CBC with platelets   Result Value Ref Range    WBC Count 9.6 4.0 - 11.0 10e3/uL    RBC Count 3.65 (L) 3.80 - 5.20 10e6/uL    Hemoglobin 10.6 (L) 11.7 - 15.7 g/dL    Hematocrit 34.0 (L) 35.0 - 47.0 %    MCV 93 78 - 100 fL    MCH 29.0 26.5 - 33.0 pg    MCHC 31.2 (L) 31.5 - 36.5 g/dL    RDW 14.3 10.0 - 15.0 %    Platelet Count 248 150 - 450 10e3/uL   Basic metabolic panel   Result Value Ref Range    Sodium 137 136 - 145 mmol/L    Potassium 3.5 3.4 - 5.3 mmol/L    Chloride 99 98 - 107 mmol/L    Carbon Dioxide (CO2) 31 (H) 22 - 29 mmol/L    Anion Gap 7 7 - 15 mmol/L    Urea Nitrogen 29.8 (H) 8.0 - 23.0  mg/dL    Creatinine 2.03 (H) 0.51 - 0.95 mg/dL    Calcium 8.0 (L) 8.8 - 10.2 mg/dL    Glucose 125 (H) 70 - 99 mg/dL    GFR Estimate 25 (L) >60 mL/min/1.73m2   Magnesium   Result Value Ref Range    Magnesium 2.3 1.7 - 2.3 mg/dL   Magnesium   Result Value Ref Range    Magnesium 2.3 1.7 - 2.3 mg/dL   Potassium   Result Value Ref Range    Potassium 3.5 3.4 - 5.3 mmol/L   Comprehensive metabolic panel   Result Value Ref Range    Sodium 142 136 - 145 mmol/L    Potassium 3.7 3.4 - 5.3 mmol/L    Chloride 102 98 - 107 mmol/L    Carbon Dioxide (CO2) 30 (H) 22 - 29 mmol/L    Anion Gap 10 7 - 15 mmol/L    Urea Nitrogen 35.1 (H) 8.0 - 23.0 mg/dL    Creatinine 1.85 (H) 0.51 - 0.95 mg/dL    Calcium 9.2 8.8 - 10.2 mg/dL    Glucose 94 70 - 99 mg/dL    Alkaline Phosphatase 47 35 - 104 U/L    AST 25 0 - 45 U/L    ALT <5 0 - 50 U/L    Protein Total 7.3 6.4 - 8.3 g/dL    Albumin 3.3 (L) 3.5 - 5.2 g/dL    Bilirubin Total 0.3 <=1.2 mg/dL    GFR Estimate 27 (L) >60 mL/min/1.73m2   Extra Purple Top Tube   Result Value Ref Range    Hold Specimen [facility]    CBC with platelets and differential   Result Value Ref Range    WBC Count 9.2 4.0 - 11.0 10e3/uL    RBC Count 3.87 3.80 - 5.20 10e6/uL    Hemoglobin 11.1 (L) 11.7 - 15.7 g/dL    Hematocrit 36.9 35.0 - 47.0 %    MCV 95 78 - 100 fL    MCH 28.7 26.5 - 33.0 pg    MCHC 30.1 (L) 31.5 - 36.5 g/dL    RDW 14.2 10.0 - 15.0 %    Platelet Count 260 150 - 450 10e3/uL    % Neutrophils 71 %    % Lymphocytes 10 %    % Monocytes 12 %    % Eosinophils 5 %    % Basophils 1 %    % Immature Granulocytes 1 %    NRBCs per 100 WBC 0 <1 /100    Absolute Neutrophils 6.6 1.6 - 8.3 10e3/uL    Absolute Lymphocytes 0.9 0.8 - 5.3 10e3/uL    Absolute Monocytes 1.1 0.0 - 1.3 10e3/uL    Absolute Eosinophils 0.5 0.0 - 0.7 10e3/uL    Absolute Basophils 0.1 0.0 - 0.2 10e3/uL    Absolute Immature Granulocytes 0.1 <=0.4 10e3/uL    Absolute NRBCs 0.0 10e3/uL         Imaging    XR Chest Port 1 View    Result Date: 9/15/2023  EXAM: XR  CHEST PORT 1 VIEW LOCATION: [facility] DATE: 9/15/2023 INDICATION: dyspnea COMPARISON: 08/28/2023     IMPRESSION: Since 08/28/2023 there has been increase in the patchy infiltrate in the left midlung worrisome for pneumonitis. The chest is otherwise stable to include findings of bibasilar atelectasis and area of nodularity projected over the right midlung laterally.    Lung perfusion scan (NM)    Result Date: 8/28/2023  EXAM: NM LUNG SCAN PERFUSION PARTICULATE LOCATION: [facility] DATE: 8/28/2023 INDICATION: Shortness of breath COMPARISON: Chest x-ray dated 08/28/2023 TECHNIQUE: 8.5 mCi technetium-99m MAA, IV. Standard lung perfusion imaging. FINDINGS: Normal pulmonary perfusion without segmental defect.     IMPRESSION: No evidence of pulmonary embolism.    XR Chest 2 Views    Result Date: 8/28/2023  EXAM: XR CHEST 2 VIEWS LOCATION: [facility] DATE: 8/28/2023 INDICATION: SOB, Hypoxia COMPARISON: 08/27/2023     IMPRESSION: Stable chest without new focal airspace opacity. Unchanged nodules in the right midlung and lung emphysematous changes. No pleural effusion or pneumothorax. Stable cardiomediastinal silhouette.    US Lower Extremity Venous Duplex Bilateral    Result Date: 8/27/2023  EXAM: ULTRASOUND LOWER EXTREMITY VENOUS DUPLEX BILATERAL LOCATION: [facility] DATE: 08/27/2023 INDICATION: Shortness of breath, hypoxia, unable to get CT scan due to kidney function. History of lung cancer with acute on chronic hypoxic respiratory failure.  Bilateral lower extremity swelling noted. COMPARISON: None. TECHNIQUE: Venous Duplex ultrasound of bilateral lower extremities with and without compression, augmentation and duplex. Color flow and spectral Doppler with waveform analysis performed. FINDINGS: Exam includes the common femoral, femoral, popliteal veins as well as segmentally visualized deep calf veins and greater  saphenous vein. RIGHT: No deep vein thrombosis. No superficial thrombophlebitis. No popliteal cyst. LEFT: No deep vein thrombosis. No superficial thrombophlebitis. No popliteal cyst. Small anechoic cystic area in the mid left thigh, of indeterminate etiology     IMPRESSION: 1.  No deep venous thrombosis in the bilateral lower extremities. 2.  Small anechoic cystic area in the mid left thigh, of indeterminate etiology.     XR Chest Port 1 View    Result Date: 8/27/2023  EXAM: XR CHEST PORT 1 VIEW LOCATION: [facility] DATE: 8/27/2023 INDICATION: sob COMPARISON: Chest x-ray 07/03/2023 and CT chest, abdomen and pelvis 08/11/2023     IMPRESSION: Heart size and pulmonary vascularity normal. Emphysema. Left diaphragmatic eventration accounting for the density in the left lung base. Subsegmental atelectasis or scarring both lower lobes. 2.5 cm mass mid right lung as seen on CT. No new infiltrates or effusions.     42 minutes spent on the date of the encounter doing chart review, history and exam, documentation, communication of the treatment plan with the care team and further activities as noted above.    Signed by: Devon Suarez MD           Again, thank you for allowing me to participate in the care of your patient.        Sincerely,        Devon Suarez MD

## 2023-09-21 NOTE — PROGRESS NOTES
"Oncology Rooming Note    September 21, 2023 10:10 AM   Irene León is a 78 year old female who presents for:    Chief Complaint   Patient presents with    Oncology Clinic Visit     Malignant neoplasm of upper lobe of right lung,  Invasive ductal carcinoma of breast, female, left     Initial Vitals: Ht 1.5 m (4' 11.06\")   BMI 22.80 kg/m   Estimated body mass index is 22.8 kg/m  as calculated from the following:    Height as of this encounter: 1.5 m (4' 11.06\").    Weight as of 9/19/23: 51.3 kg (113 lb 1.5 oz). Body surface area is 1.46 meters squared.  Data Unavailable Comment: Data Unavailable   No LMP recorded. Patient is postmenopausal.  Allergies reviewed: Yes  Medications reviewed: Yes    Medications: Medication refills not needed today.  Pharmacy name entered into [employer]:    [id number] [place], [place] - [address] [facility]  AT [facility]  [facility] - [facility], [place] - [address] [place] [address] - [address] [place] [address] [facility]TY [facility]AC[facility] - [place], [place] - [address]    Clinical concerns: 1 month follow up with labs. Hospital follow up      Alia Anne MA            "

## 2023-09-21 NOTE — PROGRESS NOTES
[facility] Hematology and Oncology Progress Note    Patient: Irene León  MRN: [id number]  Date of Service: Sep 21, 2023       Reason for Visit    Chief Complaint   Patient presents with    Oncology Clinic Visit     Malignant neoplasm of upper lobe of right lung,  Invasive ductal carcinoma of breast, female, left       Assessment and Plan     Cancer Staging   Invasive ductal carcinoma of breast, female, left (H)  Staging form: Breast, AJCC 8th Edition  - Pathologic stage from 6/20/2017: Stage IA (pT1b, pN0(sn), cM0, G1, ER+, UT+, HER2-, Oncotype DX score: 6) - Signed by Devon Suarez MD on 7/4/2022      ECOG Performance    3 - Confined to bed/chair > 50% of time, capable of limited self care     Pain  Pain Score: Severe Pain (7)  Pain Loc: Foot (Feet and back)      #.   Right upper lobe pulmonary lung cancer with adjacent satellite nodule and thoracic lymph node metastasis most consistent with locally advanced lung cancer.  There is no evidence of distant metastasis. KRAS G12C mutated.  #. Delirium in the evening   #.  History of invasive ductal carcinoma of the left breast.   Stage IA (pT1b, pN0 (sn),cM0). grade 1, associated DCIS, ER/UT positive, HER-2 negative, s/p left breast lumpectomy and left axillary sentinel lymph node biopsy. Right breast atypical ductal hyperplasia s/p right breast excisional biopsy on 6/20/2017.  She has several comorbidities including nonischemic cardiomyopathy (EF 40% in 3/17), osteoporosis on treatment with oral bisphosphonate, factor V Leiden mutation (details unknown). Started Tamoxifen in 12/2017.  #.  Severe COPD and chronic hypoxic respiratory failure, on supplemental oxygen- On 3 L at rest, 5-6 L with exertion.  #.  CHF with reduced EF of 20-25%  #.  CKD - 3b     She has gradual but significant clinical decline related to her cardiac and pulmonary issues.  In addition, since she started sotorasib, she appears to decline more for her family.  She had  confusion/sundowning/delirium in the evening.  It could be related to medication effect, are fatigue etc.  However it appears started after starting sotorasib.  She also has pedal edema which could be related to her fluid overload from her impaired heart function.  I also reviewed the sotorasib can cause a small risk of edema.  Fortunately, she no longer has diarrhea.  I reviewed her labs today.  CBC was completely normal.  She has normal electrolyte profile.  Renal function has been fluctuating with the diuretic use.   I discussed that goals of care is palliative.  Based on her multiple medical issues and new symptoms of encephalopathy, I recommended to hold sotorasib at this point.  She will continue to work on her cardiac and pulmonary function.  They are planning to get home health care for her.    Plan:  - hold sotorasib.   -If the neurologic symptoms continue or worsening, will obtain MRI brain.  - follow up in about 3 weeks to reassess for possible resuming sotorasib, perhaps with reduced dose.  - advised her to follow up with her cardiologist regarding fluid management.     Encounter Diagnoses:    Problem List Items Addressed This Visit          Oncology Diagnoses    Malignant neoplasm of upper lobe of right lung (H) - Primary    Relevant Orders    CBC with platelets and differential (Completed)    CBC with Platelets & Differential    Comprehensive metabolic panel     Other Visit Diagnoses       Delirium                  CC: Pankaj Montanez MD   ______________________________________________________________________________  Diagnosis  6/20/17  Stage IA (pT1b, pN0(sn), cM0) invasive ductal carcinoma of the left breast  - ER (high positive, 98%), AL (high positive, 97%) HER2 (negative, score of 1+ by IHC)  - Leena grade 1  - Tumor size: <1 cm  - Margin-negative on final margin with reexcision for invasive carcinoma but close margin for DCIS of 0.2 cm from new medial margin.    - 1 sentinel lymph node  removed, negative for carcinoma  - associated DCIS  - Right breast atypical ductal hyperplasia.    - Oncotype DX recurrence score 6 : 10 year risk of recurrence with 5 year of tamoxifen is 5%.    Therapy to date:  6/20/17 -right breast excisional biopsy AND left breast lumpectomy, left axillary sentinel lymph node biopsy  6/30/17-reexcision lumpectomy of the left breast  9/8/17-completed adjuvant radiation to the left breast 4256 cGy/16  12/6/17-8/11/2023- adjuvant endocrine therapy with tamoxifen.    8/2023-right upper lobe lung cancer with adjacent satellite nodules and thoracic lymph node metastasis   It was diagnosed after a progressively enlarging right upper lobe lung mass with satellite nodules.  Due to her severe COPD/emphysema, chronic hypoxic respiratory failure and increased risk of complications, lung or mediastinal lymph node biopsy was unable to obtain.  They are no other alternative site for biopsy.    Radiation is absolutely contraindicated with her pulmonary condition.  Her current performance status is not adequate for chemotherapy either.  She has strong desire to look into potential treatment option with the goal of palliation including prolonging life expectancy. [id number] test was completed.  It showed KRAS G12C, TP53 mutations.     9/1/2023-started sotorasib 960 mg daily.  Held on 9/22/2023 due to confusion/delirium.    Comorbidities  #.  Mild hypoxia and chronic cough.   She is closely follow with pulmonologist at [facility].    #. Hypertension, controlled.  #.  Osteoporosis  #.  COPD  #.  Idiopathic cardiomyopathy- LVEF 40% in October 2018, no change from prior.      History of Present Illness    Ms. Irene León presented today accompanied by her son, Yovanny. Her daughter, Leelee joined the visit by phone.     She reported recurrence of swelling in her feet a day after discharge from the hospital.  She was feeling much better at the time of discharge.  She had diarrhea related to sotorasib  at the beginning.  Then she did not notice any diarrhea.  The family reported that she was confused and she called her daughter last night leaving a voicemail that she was confused which she was.  Stephanie recall when she said.  She was mentally clear in the morning.    She was rehospitalized with CHF exacerbation and discharged 2 days ago.  Her diuretic dose was increased by Lasix 40 mg to 160 mg daily now.  She is home with her son and daughter checked on her. She lives with her  who has dementia. They are looking into home health services a few days a week.     Review of systems  Apart from describing in HPI, the remainder of comprehensive ROS was negative.    Past History    Past Medical History:   Diagnosis Date    ANATOLIY (acute kidney injury) (H)     Allergic rhinitis     Arrhythmia     Atrial fibrillation with RVR (H)     Bacteremia     Breast cancer (H) 2017    Cardiomyopathy (H)     Centrilobular emphysema (H)     CHF (congestive heart failure) (H)     Chronic kidney disease     CKD (chronic kidney disease)     COPD (chronic obstructive pulmonary disease) (H)     Coronary artery disease     Depression     Dysphagia     E. coli sepsis (H)     Factor 5 Leiden mutation, heterozygous (H)     GERD (gastroesophageal reflux disease)     Heart failure with reduced ejection fraction (H) 04/17/2023    Hx of radiation therapy 2017    Hyperlipidemia     Hypertension     Hypokalemia     Hypomagnesemia     Idiopathic cardiomyopathy (H)     Left hip pain 04/30/2014    OAB (overactive bladder)     Osteoporosis     Sacral insufficiency fracture     Sinusitis     Syncope     Urge incontinence     Vocal cord dysfunction        Past Surgical History:   Procedure Laterality Date    ARTHROPLASTY REVISION HIP Left     BIOPSY BREAST Right 2017    BLADDER SURGERY      bladder interstim with removal    CARDIAC CATHETERIZATION      CATARACT EXTRACTION Left 07/18/2017    IR ABDOMINAL AORTOGRAM  4/16/2003    IR AORTIC ARCH 4 VESSEL  "ANGIOGRAM  4/16/2003    IR MISCELLANEOUS PROCEDURE  4/16/2003    LUMPECTOMY BREAST Left 06/2017    x2    PICC DOUBLE LUMEN PLACEMENT  4/11/2022         PICC TRIPLE LUMEN PLACEMENT  4/7/2022         ZZC OPEN TX FEMORAL FRACTURE DISTAL MED/LAT CONDYLE Left 10/28/2015    Procedure: OPEN REDUCTION INTERNAL FIXATION LEFT  PROXIMAL FEMUR PERIPROSTHETIC FRACTURE;  Surgeon: Yovanny Albarran MD;  Location: [facility];  Service: Orthopedics       Physical Exam    /58 (BP Location: Left arm, Patient Position: Sitting, Cuff Size: Adult Regular)   Pulse 75   Ht 1.5 m (4' 11.06\")   SpO2 95%   BMI 22.80 kg/m      General: alert, awake, not in acute distress however, she looks very tired.  Frail.  She wears 3 L oxygen by nasal cannula.  She came in a wheelchair.  HEENT: Head: Normal, normocephalic, atraumatic.  Eye: Normal external eye, conjunctiva, lids cornea, CATINA.  Pharynx: Normal buccal mucosa. Normal pharynx.  Neck / Thyroid: Supple, no masses, nodes, nodules or enlargement.  Lymphatics: No abnormally enlarged lymph nodes.  Chest: Normal chest wall and respirations. Clear to auscultation.  Heart: S1 S2 RRR.  3+ pedal edema.  No edema in the lower extremities.  Abdomen: abdomen is soft without significant tenderness, masses, organomegaly or guarding  Extremities: normal strength, tone, and muscle mass  Skin: normal. no rash or abnormalities  CNS: non focal.  Oriented x3.     Lab Results    Recent Results (from the past 168 hour(s))   Basic metabolic panel   Result Value Ref Range    Sodium 141 136 - 145 mmol/L    Potassium 3.9 3.4 - 5.3 mmol/L    Chloride 105 98 - 107 mmol/L    Carbon Dioxide (CO2) 23 22 - 29 mmol/L    Anion Gap 13 7 - 15 mmol/L    Urea Nitrogen 13.8 8.0 - 23.0 mg/dL    Creatinine 1.45 (H) 0.51 - 0.95 mg/dL    Calcium 8.9 8.8 - 10.2 mg/dL    Glucose 101 (H) 70 - 99 mg/dL    GFR Estimate 37 (L) >60 mL/min/1.73m2   N terminal pro BNP outpatient   Result Value Ref Range    N Terminal Pro BNP " Outpatient 9,602 (H) 0 - 1,800 pg/mL   Troponin T, High Sensitivity (now)   Result Value Ref Range    Troponin T, High Sensitivity 38 (H) <=14 ng/L   Extra Blue Top Tube   Result Value Ref Range    Hold Specimen JIC    Extra Red Top Tube   Result Value Ref Range    Hold Specimen JIC    Magnesium   Result Value Ref Range    Magnesium 2.3 1.7 - 2.3 mg/dL   Procalcitonin   Result Value Ref Range    Procalcitonin 0.06 (H) <0.05 ng/mL   CRP inflammation   Result Value Ref Range    CRP Inflammation 38.00 (H) <5.00 mg/L   ECG 12-LEAD WITH MUSE (LHE)   Result Value Ref Range    Systolic Blood Pressure  mmHg    Diastolic Blood Pressure  mmHg    Ventricular Rate 95 BPM    Atrial Rate 95 BPM    ID Interval 122 ms    QRS Duration 126 ms     ms    QTc 510 ms    P Axis 75 degrees    R AXIS 10 degrees    T Axis 116 degrees    Interpretation ECG       Sinus rhythm with sinus arrhythmia with occasional Premature ventricular complexes  Possible Left atrial enlargement  Left bundle branch block  Abnormal ECG  When compared with ECG of 27-AUG-2023 00:33,  Premature ventricular complexes are now Present  Premature supraventricular complexes are no longer Present  QRS axis Shifted right  Confirmed by SEE ED PROVIDER NOTE FOR, ECG INTERPRETATION ([id number]),  VEL NAIR ([id number]) on 9/15/2023 8:19:37 PM     CBC with platelets and differential   Result Value Ref Range    WBC Count 8.4 4.0 - 11.0 10e3/uL    RBC Count 4.12 3.80 - 5.20 10e6/uL    Hemoglobin 11.7 11.7 - 15.7 g/dL    Hematocrit 37.9 35.0 - 47.0 %    MCV 92 78 - 100 fL    MCH 28.4 26.5 - 33.0 pg    MCHC 30.9 (L) 31.5 - 36.5 g/dL    RDW 14.5 10.0 - 15.0 %    Platelet Count 233 150 - 450 10e3/uL    % Neutrophils 68 %    % Lymphocytes 15 %    % Monocytes 12 %    % Eosinophils 3 %    % Basophils 1 %    % Immature Granulocytes 1 %    NRBCs per 100 WBC 0 <1 /100    Absolute Neutrophils 5.9 1.6 - 8.3 10e3/uL    Absolute Lymphocytes 1.2 0.8 - 5.3 10e3/uL    Absolute Monocytes  1.0 0.0 - 1.3 10e3/uL    Absolute Eosinophils 0.2 0.0 - 0.7 10e3/uL    Absolute Basophils 0.0 0.0 - 0.2 10e3/uL    Absolute Immature Granulocytes 0.0 <=0.4 10e3/uL    Absolute NRBCs 0.0 10e3/uL   Lactic acid whole blood   Result Value Ref Range    Lactic Acid 0.7 0.7 - 2.0 mmol/L   Troponin T, High Sensitivity   Result Value Ref Range    Troponin T, High Sensitivity 50 (H) <=14 ng/L   Extra Blue Top Tube   Result Value Ref Range    Hold Specimen JIC    Extra Purple Top Tube   Result Value Ref Range    Hold Specimen JIC    Troponin T, High Sensitivity   Result Value Ref Range    Troponin T, High Sensitivity 44 (H) <=14 ng/L   Troponin T, High Sensitivity   Result Value Ref Range    Troponin T, High Sensitivity 44 (H) <=14 ng/L   CBC with platelets   Result Value Ref Range    WBC Count 9.3 4.0 - 11.0 10e3/uL    RBC Count 4.35 3.80 - 5.20 10e6/uL    Hemoglobin 12.3 11.7 - 15.7 g/dL    Hematocrit 40.5 35.0 - 47.0 %    MCV 93 78 - 100 fL    MCH 28.3 26.5 - 33.0 pg    MCHC 30.4 (L) 31.5 - 36.5 g/dL    RDW 14.6 10.0 - 15.0 %    Platelet Count 258 150 - 450 10e3/uL   CRP inflammation   Result Value Ref Range    CRP Inflammation 47.00 (H) <5.00 mg/L   Procalcitonin   Result Value Ref Range    Procalcitonin 0.07 (H) <0.05 ng/mL   Potassium   Result Value Ref Range    Potassium 3.5 3.4 - 5.3 mmol/L   Magnesium   Result Value Ref Range    Magnesium 2.1 1.7 - 2.3 mg/dL   Basic metabolic panel   Result Value Ref Range    Sodium 140 136 - 145 mmol/L    Potassium 3.5 3.4 - 5.3 mmol/L    Chloride 99 98 - 107 mmol/L    Carbon Dioxide (CO2) 30 (H) 22 - 29 mmol/L    Anion Gap 11 7 - 15 mmol/L    Urea Nitrogen 18.5 8.0 - 23.0 mg/dL    Creatinine 1.63 (H) 0.51 - 0.95 mg/dL    Calcium 8.9 8.8 - 10.2 mg/dL    Glucose 98 70 - 99 mg/dL    GFR Estimate 32 (L) >60 mL/min/1.73m2   CBC with platelets   Result Value Ref Range    WBC Count 9.6 4.0 - 11.0 10e3/uL    RBC Count 3.65 (L) 3.80 - 5.20 10e6/uL    Hemoglobin 10.6 (L) 11.7 - 15.7 g/dL     Hematocrit 34.0 (L) 35.0 - 47.0 %    MCV 93 78 - 100 fL    MCH 29.0 26.5 - 33.0 pg    MCHC 31.2 (L) 31.5 - 36.5 g/dL    RDW 14.3 10.0 - 15.0 %    Platelet Count 248 150 - 450 10e3/uL   Basic metabolic panel   Result Value Ref Range    Sodium 137 136 - 145 mmol/L    Potassium 3.5 3.4 - 5.3 mmol/L    Chloride 99 98 - 107 mmol/L    Carbon Dioxide (CO2) 31 (H) 22 - 29 mmol/L    Anion Gap 7 7 - 15 mmol/L    Urea Nitrogen 29.8 (H) 8.0 - 23.0 mg/dL    Creatinine 2.03 (H) 0.51 - 0.95 mg/dL    Calcium 8.0 (L) 8.8 - 10.2 mg/dL    Glucose 125 (H) 70 - 99 mg/dL    GFR Estimate 25 (L) >60 mL/min/1.73m2   Magnesium   Result Value Ref Range    Magnesium 2.3 1.7 - 2.3 mg/dL   Magnesium   Result Value Ref Range    Magnesium 2.3 1.7 - 2.3 mg/dL   Potassium   Result Value Ref Range    Potassium 3.5 3.4 - 5.3 mmol/L   Comprehensive metabolic panel   Result Value Ref Range    Sodium 142 136 - 145 mmol/L    Potassium 3.7 3.4 - 5.3 mmol/L    Chloride 102 98 - 107 mmol/L    Carbon Dioxide (CO2) 30 (H) 22 - 29 mmol/L    Anion Gap 10 7 - 15 mmol/L    Urea Nitrogen 35.1 (H) 8.0 - 23.0 mg/dL    Creatinine 1.85 (H) 0.51 - 0.95 mg/dL    Calcium 9.2 8.8 - 10.2 mg/dL    Glucose 94 70 - 99 mg/dL    Alkaline Phosphatase 47 35 - 104 U/L    AST 25 0 - 45 U/L    ALT <5 0 - 50 U/L    Protein Total 7.3 6.4 - 8.3 g/dL    Albumin 3.3 (L) 3.5 - 5.2 g/dL    Bilirubin Total 0.3 <=1.2 mg/dL    GFR Estimate 27 (L) >60 mL/min/1.73m2   Extra Purple Top Tube   Result Value Ref Range    Hold Specimen JI    CBC with platelets and differential   Result Value Ref Range    WBC Count 9.2 4.0 - 11.0 10e3/uL    RBC Count 3.87 3.80 - 5.20 10e6/uL    Hemoglobin 11.1 (L) 11.7 - 15.7 g/dL    Hematocrit 36.9 35.0 - 47.0 %    MCV 95 78 - 100 fL    MCH 28.7 26.5 - 33.0 pg    MCHC 30.1 (L) 31.5 - 36.5 g/dL    RDW 14.2 10.0 - 15.0 %    Platelet Count 260 150 - 450 10e3/uL    % Neutrophils 71 %    % Lymphocytes 10 %    % Monocytes 12 %    % Eosinophils 5 %    % Basophils 1 %    %  Immature Granulocytes 1 %    NRBCs per 100 WBC 0 <1 /100    Absolute Neutrophils 6.6 1.6 - 8.3 10e3/uL    Absolute Lymphocytes 0.9 0.8 - 5.3 10e3/uL    Absolute Monocytes 1.1 0.0 - 1.3 10e3/uL    Absolute Eosinophils 0.5 0.0 - 0.7 10e3/uL    Absolute Basophils 0.1 0.0 - 0.2 10e3/uL    Absolute Immature Granulocytes 0.1 <=0.4 10e3/uL    Absolute NRBCs 0.0 10e3/uL         Imaging    XR Chest Port 1 View    Result Date: 9/15/2023  EXAM: XR CHEST PORT 1 VIEW LOCATION: [facility] DATE: 9/15/2023 INDICATION: dyspnea COMPARISON: 08/28/2023     IMPRESSION: Since 08/28/2023 there has been increase in the patchy infiltrate in the left midlung worrisome for pneumonitis. The chest is otherwise stable to include findings of bibasilar atelectasis and area of nodularity projected over the right midlung laterally.    Lung perfusion scan (NM)    Result Date: 8/28/2023  EXAM: NM LUNG SCAN PERFUSION PARTICULATE LOCATION: [facility] DATE: 8/28/2023 INDICATION: Shortness of breath COMPARISON: Chest x-ray dated 08/28/2023 TECHNIQUE: 8.5 mCi technetium-99m MAA, IV. Standard lung perfusion imaging. FINDINGS: Normal pulmonary perfusion without segmental defect.     IMPRESSION: No evidence of pulmonary embolism.    XR Chest 2 Views    Result Date: 8/28/2023  EXAM: XR CHEST 2 VIEWS LOCATION: [facility] DATE: 8/28/2023 INDICATION: SOB, Hypoxia COMPARISON: 08/27/2023     IMPRESSION: Stable chest without new focal airspace opacity. Unchanged nodules in the right midlung and lung emphysematous changes. No pleural effusion or pneumothorax. Stable cardiomediastinal silhouette.    US Lower Extremity Venous Duplex Bilateral    Result Date: 8/27/2023  EXAM: ULTRASOUND LOWER EXTREMITY VENOUS DUPLEX BILATERAL LOCATION: [facility] DATE: 08/27/2023 INDICATION: Shortness of breath, hypoxia, unable to get CT scan due to kidney function. History of lung  cancer with acute on chronic hypoxic respiratory failure.  Bilateral lower extremity swelling noted. COMPARISON: None. TECHNIQUE: Venous Duplex ultrasound of bilateral lower extremities with and without compression, augmentation and duplex. Color flow and spectral Doppler with waveform analysis performed. FINDINGS: Exam includes the common femoral, femoral, popliteal veins as well as segmentally visualized deep calf veins and greater saphenous vein. RIGHT: No deep vein thrombosis. No superficial thrombophlebitis. No popliteal cyst. LEFT: No deep vein thrombosis. No superficial thrombophlebitis. No popliteal cyst. Small anechoic cystic area in the mid left thigh, of indeterminate etiology     IMPRESSION: 1.  No deep venous thrombosis in the bilateral lower extremities. 2.  Small anechoic cystic area in the mid left thigh, of indeterminate etiology.     XR Chest Port 1 View    Result Date: 8/27/2023  EXAM: XR CHEST PORT 1 VIEW LOCATION: [facility] DATE: 8/27/2023 INDICATION: sob COMPARISON: Chest x-ray 07/03/2023 and CT chest, abdomen and pelvis 08/11/2023     IMPRESSION: Heart size and pulmonary vascularity normal. Emphysema. Left diaphragmatic eventration accounting for the density in the left lung base. Subsegmental atelectasis or scarring both lower lobes. 2.5 cm mass mid right lung as seen on CT. No new infiltrates or effusions.     42 minutes spent on the date of the encounter doing chart review, history and exam, documentation, communication of the treatment plan with the care team and further activities as noted above.    Signed by: Devon Suarez MD

## 2023-09-23 NOTE — ED PROVIDER NOTES
EMERGENCY DEPARTMENT ENCOUNTER       ED Course & Medical Decision Making     3:54 PM I met with the patient to obtain patient history and performed a physical exam. Discussed plan for ED work up including potential diagnostic studies and interventions.  3:55 PM RT is now at bedside.  7:00 PM Reevaluated and updated patient's family on results and plans for admission.  7:55 PM Reevaluated and updated patient's family with findings.  8:04 PM Spoke with Mony Resident who accepts patient for admission.    Final Impression  78 year old female brought in by EMS for evaluation of respiratory distress.  Patient has a history of both COPD as well as CHF, on chronic Lasix 80 mg daily as well as several inhalers which patient reports compliance with.  Patient is on 2-3 L of oxygen at baseline, 5-6 with exertion indicating fairly significant/advanced COPD at baseline.  Per EMS when they arrived she was in significant respiratory distress, they describe tripoding, thus she was placed on CPAP in route, though no nebs were given as they thought she was having a CHF exacerbation.  On my initial exam patient does not have any appreciable crackles, though does have some trace to +1 pitting edema bilateral lower extremities.  Severely diminished lung sounds throughout, clinical exam and history most suggestive of COPD exacerbation.  Given DuoNebs x2 as well as 125 mg IV Solu-Medrol, transition to BiPAP shortly after arrival.  We did eventually wean patient from BiPAP a few hours after arrival and placed her back on 5 L which is near her baseline.    Daughter expressed specific concerns that patient may need to be on hospice.  I do share daughter's concerns given that she has fairly advanced COPD and heart failure with several recent admissions, patient is currently living independently, though based on daughter's description it does not sound like she is thriving.  Did discuss this fairly openly with patient that her medical  conditions may warrant a conversation with hospice about possible options as well as addressing CODE STATUS, discussed with patient openly that I do not think she would have a good outcome in the event of cardiac arrest regardless of CPR or intubation/ventilation.    Prior to making a final disposition on this patient the results of patient's tests and other diagnostic studies were discussed with the patient. All questions were answered. Patient expressed understanding of the plan and was amenable to it.    Medical Decision Making    History:  Supplemental history from: EMS, daughter    Work Up:  Chart documentation includes differential considered and any EKGs or imaging independently interpreted by provider, where specified.    External consultation:  Discussion of management with another provider: Hospitalist    Complicating factors:  Care impacted by chronic illness: Anticoagulated State, Cancer/Chemotherapy, Chronic Kidney Disease, Chronic Lung Disease, Heart Disease, and Other: Afib  Care affected by social determinants of health: N/A    Disposition considerations: Admit.      Medications   ipratropium - albuterol 0.5 mg/2.5 mg/3 mL (DUONEB) neb solution 3 mL (3 mLs Nebulization $Given 9/23/23 1639)   methylPREDNISolone sodium succinate (solu-MEDROL) injection 125 mg (125 mg Intravenous $Given 9/23/23 1757)   ipratropium - albuterol 0.5 mg/2.5 mg/3 mL (DUONEB) neb solution 3 mL (3 mLs Nebulization $Given 9/23/23 1805)       New Prescriptions    No medications on file       Final Impression     1. Acute respiratory failure with hypoxia (H)    2. COPD exacerbation (H)    3. Shortness of breath        Critical Care     Performed by: Emir Summers MD  Authorized by: Emir Summers MD                   Total critical care time: 35 minutes  Critical care was necessary to treat or prevent imminent or life-threatening deterioration of the following conditions: Acute respiratory failure with  hypoxia  Critical care was time spent personally by me on the following activities: development of treatment plan with patient or surrogate, discussions with consultants, examination of patient, evaluation of patient's response to treatment, obtaining history from patient or surrogate, ordering and performing treatments and interventions, ordering and review of laboratory studies, ordering and review of radiographic studies, re-evaluation of patient's condition and monitoring for potential decompensation.  Critical care time was exclusive of separately billable procedures and treating other patients.    Chief Complaint     Chief Complaint   Patient presents with    Respiratory Distress       HPI     Irene León is a 78 year old female who presents for evaluation of shortness of breath.    Per EMS, patient comes from home for evaluation of shortness of breath. Within the last 20-25 minutes (~3:30 PM), patient developed sudden onset persistent shortness of breath. Patient has history of CHF and has been taking 80 mg of Lasix BID with no recent missed doses. Patient was placed on a non-rebreather with initial relief but patient later worked herself up and was placed on CPAP. Patient was given 1 mg of nitroglycerin en route. Blood sugar was 121. Patient reports she uses inhalers at home. There were no other concerns/complaints at this time.    Per chart review, patient was admitted at [facility] from 9/15-9/19/2023 for evaluation of decompensated HFrEF. Patient initially presented to the ED with worsening lower extremity edema with weeping open blisters on her left leg for 1 week. Labs in ED showed elevated proBNP and troponin was mildly elevated at 38. Chest Xray showed increase in patchy infiltrate in the left midlung. ECG showed LBBB, PVCs, prolonged QTc. Patient was given IV lasix and admitted for CHF and COPD exacerbation. During admission, cardiology was consulted and recommended  aggressive IV diuresis Lasix 80 mg every 8 hours. Patient responded well to IV diuresis and also improved in her oxygen requirement approximating her baseline oxygen requirement by discharge. Patient was recommended to be discharged to TCU but she ultimately declined adn was discharged home with follow up with: PCP in 1 week, wound care referral, and cardiology in 2 weeks. She was discharged with a changed dose of lasix 80 mg BID.    I, Miryam Muniz am serving as a scribe to document services personally performed by Dr. Emir Summers MD, based on my observation and the provider's statements to me. I, Dr. Emir Summers MD attest that Miryam Muniz is acting in a scribe capacity, has observed my performance of the services and has documented them in accordance with my direction.    Physical Exam     /52   Pulse 81   Resp 21   SpO2 93%   Constitutional: Awake, in severe respiratory distress  Head: Normocephalic, atraumatic.  ENT: Mucous membranes moist.  Eyes: Conjunctiva normal.  Respiratory: Extremely tachypneic, respirations in the 30s, on CPAP via EMS.  Diminished lung sounds throughout, poor air movement, no crackles appreciated  Cardiovascular: Regular rate and rhythm. Good peripheral perfusion.  Trace pitting edema of bilateral lower extremities up to the midshin  GI: Abdomen soft, non-tender.  Musculoskeletal: Moves all 4 extremities equally.  Moderate bruising and swelling over the right fifth metatarsal, no skin breaks, no warmth or significant erythema.  Integument: Warm, dry.  Neurologic: Alert & oriented. Normal speech. Grossly normal motor and sensory function. No focal deficits noted.    Labs & Imaging     Results for orders placed or performed during the hospital encounter of 09/23/23   XR Chest Port 1 View    Impression    IMPRESSION: Heart size is stable. Right midlung nodule measuring 2.5 cm, unchanged. Decrease in linear parenchymal opacity in the left midlung. Chronic interstitial  scarring/fibrosis bilaterally. There is some increasing pleural thickening in the left   upper lobe. No pneumothorax. Old right rib fracture.   XR Foot Port Right 2 Views    Impression    IMPRESSION: The right foot is negative for fracture. There is significant soft tissue swelling along the dorsal aspect of the foot. Cellulitis could also have this appearance.   Basic metabolic panel   Result Value Ref Range    Sodium 138 136 - 145 mmol/L    Potassium 3.6 3.4 - 5.3 mmol/L    Chloride 98 98 - 107 mmol/L    Carbon Dioxide (CO2) 32 (H) 22 - 29 mmol/L    Anion Gap 8 7 - 15 mmol/L    Urea Nitrogen 24.3 (H) 8.0 - 23.0 mg/dL    Creatinine 1.73 (H) 0.51 - 0.95 mg/dL    Calcium 9.2 8.8 - 10.2 mg/dL    Glucose 103 (H) 70 - 99 mg/dL    GFR Estimate 30 (L) >60 mL/min/1.73m2   N terminal pro BNP outpatient   Result Value Ref Range    N Terminal Pro BNP Outpatient 6,037 (H) 0 - 1,800 pg/mL   Troponin T, High Sensitivity (now)   Result Value Ref Range    Troponin T, High Sensitivity 43 (H) <=14 ng/L   Blood gas venous   Result Value Ref Range    pH Venous 7.42 7.35 - 7.45    pCO2 Venous 56 (H) 35 - 50 mm Hg    pO2 Venous 19 (L) 25 - 47 mm Hg    Bicarbonate Venous 36 (H) 24 - 30 mmol/L    Base Excess/Deficit 11.4   mmol/L    Oxyhemoglobin Venous 26.2 (L) 70.0 - 75.0 %    O2 Sat, Venous 26.7 (L) 70.0 - 75.0 %   CBC with platelets and differential   Result Value Ref Range    WBC Count 9.4 4.0 - 11.0 10e3/uL    RBC Count 3.87 3.80 - 5.20 10e6/uL    Hemoglobin 11.0 (L) 11.7 - 15.7 g/dL    Hematocrit 36.0 35.0 - 47.0 %    MCV 93 78 - 100 fL    MCH 28.4 26.5 - 33.0 pg    MCHC 30.6 (L) 31.5 - 36.5 g/dL    RDW 14.1 10.0 - 15.0 %    Platelet Count 242 150 - 450 10e3/uL    % Neutrophils 66 %    % Lymphocytes 16 %    % Monocytes 14 %    % Eosinophils 4 %    % Basophils 0 %    % Immature Granulocytes 0 %    NRBCs per 100 WBC 0 <1 /100    Absolute Neutrophils 6.1 1.6 - 8.3 10e3/uL    Absolute Lymphocytes 1.5 0.8 - 5.3 10e3/uL    Absolute  Monocytes 1.3 0.0 - 1.3 10e3/uL    Absolute Eosinophils 0.4 0.0 - 0.7 10e3/uL    Absolute Basophils 0.0 0.0 - 0.2 10e3/uL    Absolute Immature Granulocytes 0.0 <=0.4 10e3/uL    Absolute NRBCs 0.0 10e3/uL       EKG     EKG reviewed and independently interpreted as below;  Sinus rhythm with premature SVC's.  Rate 72.  .  .  QTc 470.  No STEMI.  Left bundle branch block present.     Emir Summers MD  09/24/23 0015

## 2023-09-23 NOTE — ED TRIAGE NOTES
Pt developed increased SOB approx 30 min prior to arrival. Pt arrived via EMS on CPAP, Pt transferred to ED bed, Bipap placed on pt, Pt more comfortable. Pt was recently discharged from hospital. Pt states she has been taking all meds.      Triage Assessment       Row Name 09/23/23 1600       Triage Assessment (Adult)    Airway WDL airway symptoms    Additional Documentation Breath Sounds (Group);Edema (Group)       Edema    Edema leg, left;leg, right;foot, right;foot, left;ankle, right;ankle, left    Leg, Left Edema 3+ (Moderate)    Leg, Right Edema 3+ (Moderate)    Ankle, Left Edema 3+ (Moderate)    Ankle, Right Edema 3+ (Moderate)    Foot, Left Edema 3+ (Moderate)    Foot, Right Edema 3+ (Moderate)

## 2023-09-23 NOTE — PHARMACY-ADMISSION MEDICATION HISTORY
Pharmacist Admission Medication History    Admission medication history is complete. The information provided in this note is only as accurate as the sources available at the time of the update.    Medication reconciliation/reorder completed by provider prior to medication history? No    Information Source(s): Patient and Hospital records via in-person    Pertinent Information:     Patient discharged [facility] 4 days ago.  Patient reports the only change in her medication regimen is that he Lumakras has been put on hold since 9/21/23.    Patient reports compliance with home medication.  Last home medications taken through this afternoon    Changes made to PTA medication list:  Added: None  Deleted: None  Changed: hold Lumakras    Medication Affordability:       Allergies reviewed with patient and updates made in EHR: yes    Medication History Completed By: Spike Deal RPH 9/23/2023 6:33 PM    Prior to Admission medications    Medication Sig Last Dose Taking? Auth Provider Long Term End Date   acetaminophen (TYLENOL) 500 MG tablet Take 1,000 mg by mouth every 8 hours as needed for mild pain or fever  Unknown Yes Provider, Historical     acetylcysteine (MUCOMYST) 10 % nebulizer solution Inhale 4 mLs into the lungs every 4 hours as needed for mucolysis/respiratory distress Unknown Yes Unknown, Entered By History     albuterol (PROAIR HFA/PROVENTIL HFA/VENTOLIN HFA) 108 (90 Base) MCG/ACT inhaler Inhale 2 puffs into the lungs every 6 hours as needed for shortness of breath, wheezing or cough Unknown Yes Unknown, Entered By History No    Azelastine HCl 137 MCG/SPRAY SOLN Spray 1 spray into both nostrils 2 times daily as needed for rhinitis Unknown Yes Unknown, Entered By History     benzonatate (TESSALON PERLES) 100 MG capsule Take 1 capsule (100 mg) by mouth 3 times daily as needed for cough Unknown Yes Tobi Miguel MD     CALCIUM-MAGNESIUM-ZINC PO Take 1 tablet by mouth daily 9/23/2023 at 0900 Yes Unknown,  Entered By History     cetirizine (ZYRTEC) 5 MG tablet Take 1 tablet (5 mg) by mouth daily as needed for allergies Unknown Yes Dereje Isaac MD     chlorhexidine (PERIDEX) 0.12 % solution [CHLORHEXIDINE (PERIDEX) 0.12 % SOLUTION] Apply 15 mL to the mouth or throat at bedtime as needed.  Unknown Yes Provider, Historical     escitalopram (LEXAPRO) 10 MG tablet Take 1 tablet (10 mg) by mouth daily 9/23/2023 at 0900 Yes Pankaj Montanez MD Yes    fluticasone (FLONASE) 50 MCG/ACT nasal spray Spray 1 spray into both nostrils daily as needed for rhinitis or allergies Unknown Yes Unknown, Entered By History No    Fluticasone-Umeclidin-Vilanterol (TRELEGY ELLIPTA) 200-62.5-25 MCG/INH oral inhaler Inhale 1 puff into the lungs At Bedtime 9/22/2023 at 2100 Yes Unknown, Entered By History     furosemide (LASIX) 80 MG tablet Take 1 tablet (80 mg) by mouth 2 times daily 9/23/2023 at 1400 Yes Dereje Isaac MD Yes    gabapentin (NEURONTIN) 600 MG tablet Take 600 mg by mouth At Bedtime 9/22/2023 at 2100 Yes Unknown, Entered By History No    HYDROmorphone, STANDARD CONC, (DILAUDID) 1 MG/ML oral solution Take 1 mg by mouth 3 times daily as needed for pain Unknown Yes Unknown, Entered By History     ipratropium (ATROVENT HFA) 17 MCG/ACT inhaler Inhale 2 puffs into the lungs every 6 hours as needed for wheezing Unknown Yes Unknown, Entered By History No    ipratropium - albuterol 0.5 mg/2.5 mg/3 mL (DUONEB) 0.5-2.5 (3) MG/3ML neb solution Take 1 vial by nebulization every 6 hours as needed for shortness of breath, wheezing or cough Unknown Yes Unknown, Entered By History No    Melatonin 5 MG TBDP Take 15 mg by mouth At Bedtime 9/22/2023 at 2100 Yes Unknown, Entered By History     metoprolol succinate ER (TOPROL XL) 50 MG 24 hr tablet Take 1.5 tablets (75 mg) by mouth At Bedtime 9/22/2023 at 2100 Yes Pankaj Montanez MD Yes    mirabegron (MYRBETRIQ) 25 MG 24 hr tablet Take 1 tablet (25 mg) by mouth daily 9/23/2023 at 0900 Yes Poncho  MD Dereje Yes    multivitamin w/minerals (THERA-VIT-M) tablet Take 1 tablet by mouth daily 9/23/2023 at 0900 Yes Reported, Patient     nystatin (MYCOSTATIN) 942351 UNIT/GM external cream Apply topically 3 times daily as needed for dry skin Unknown Yes Unknown, Entered By History     OLANZapine (ZYPREXA) 5 MG tablet Take 1 tablet (5 mg) by mouth At Bedtime 9/22/2023 at 2100 Yes Tobi Miguel MD Yes    prochlorperazine (COMPAZINE) 10 MG tablet Take 1 tablet (10 mg) by mouth every 6 hours as needed (Nausea/Vomiting) Unknown Yes Devon Suarez MD     rivaroxaban ANTICOAGULANT (XARELTO) 15 MG TABS tablet Take 1 tablet (15 mg) by mouth At Bedtime 9/22/2023 at 2100 Yes Unknown, Entered By History No    simvastatin (ZOCOR) 40 MG tablet Take 1 tablet (40 mg) by mouth At Bedtime 9/22/2023 at 2100 Yes Pankaj Montanez MD Yes    sodium chloride 0.9 % neb solution Take 3 mLs by nebulization every 3 hours as needed for wheezing Unknown Yes Unknown, Entered By History     solifenacin (VESICARE) 5 MG tablet Take 1 tablet (5 mg) by mouth daily 9/22/2023 at 0900 Yes Pankaj Montanez MD Yes    spironolactone (ALDACTONE) 25 MG tablet Take 1 tablet (25 mg) by mouth daily 9/23/2023 at 0900 Yes Dereje Isaac MD Yes    tamoxifen (NOLVADEX) 20 MG tablet Take 1 tablet (20 mg) by mouth daily 9/23/2023 at 0900 Yes Devon Suarez MD Yes    traMADol (ULTRAM) 50 MG tablet Take 50 mg by mouth 2 times daily as needed for severe pain Unknown Yes Unknown, Entered By History     vitamin D3 (CHOLECALCIFEROL) 50 mcg (2000 units) tablet Take 50 mcg by mouth daily 9/23/2023 at 0900 Yes Unknown, Entered By History     zinc oxide (DESITIN) 40 % external ointment Apply topically as needed for dry skin or irritation (Twice a day to gluteal area skin irritation) Unknown Yes Pankaj Montanez MD     famotidine (PEPCID) 20 MG tablet Take 1 tablet (20 mg) by mouth every other day 9/21/2023 at 2200  Dereje Isaac MD     sotorasib (LUMAKRAS) 120 MG  tablet Take 8 tablets (960 mg) by mouth daily for 30 days Do not chew, crush or split tablets. 9/20/2023 at 0900 ON HOLD AS OF 9/21/23  Devon Suarez MD  10/1/23        Detail Level: Detailed Quality 130: Documentation Of Current Medications In The Medical Record: Current Medications Documented Quality 431: Preventive Care And Screening: Unhealthy Alcohol Use - Screening: Patient screened for unhealthy alcohol use using a single question and scores less than 2 times per year Quality 226: Preventive Care And Screening: Tobacco Use: Screening And Cessation Intervention: Patient screened for tobacco use and is an ex/non-smoker

## 2023-09-23 NOTE — PROGRESS NOTES
Pt was placed on BiPAP settings 10/5/16/50%. Neb treatment given. BS diminished. Around 1813 pt taken off BiPAP and placed on 5 L oxymask sat's high 90's. BiPAP in the room on standby.

## 2023-09-24 NOTE — PROGRESS NOTES
[facility]    Progress Note - Hospitalist Service       Date of Admission:  9/23/2023    Assessment & Plan   Irene León is a 78 year old female admitted on 9/23/2023. She has a history of CHF with reduced ejection fraction (most recently EF 20-25% on 6/22), cardiomyopathy, severe COPD with chronic home oxygen 3 L at rest and 5-6 L with exertion, CAD, paroxysmal A-fib on Xarelto, CKD stage IV, lung cancer on oral chemo and breast cancer on oral chemo who is admitted for acute hypoxic respiratory failure.     Acute on Chronic hypoxic Respiratory Failure   COPD exacerbation   Patient with history of severe COPD on home oxygen at 3 LPM; recently has been requiring 5+ LPM with exertion which is above what current home oxygen machine is able to do. Acutely SOB at home on day of admission requiring BiPAP by EMS. VBG on arrival with pH 7.42 with CO2 of 56. Recevied duonebs x2 and solumedrol in ED, transitioned to 5L O2 via oxymask where she is now stable, saturating well, and asymptomatic. Has been hospitalized for respiratory failure several times over the last few months, COPD determined to be the largest contributing etiology most recently in July 2023 though likely multifactorial with CHF contributing as below. As patient responded well to largely COPD interventions, this is likely largest contributor to acute decompensation today. Patient was previously full code but was transitioned to DNR/DNI status after much discussion in ED on 9/23/23. Hospice/palliative consults are placed and pending.  - Continue PTA inhalers, mucomyst, tessalon perles  - RCAT   - Duonebs and Mucomyst PRN   - S/p solumedrol in ER then transitioned to prednisone 40mg   - Started on doxycycline and ceftriaxone   -Will continue ceftriaxone for today for empiric treatment of possible infectious etiology, may re-assess tomorrow pending sputum cultures and overall clinical status  - Respiratory panel and sputum  cultures pending collection as able  - Strict I&O's and daily weights  - Continuous pulse ox   - Supplemental O2 as needed to maintain oxygen saturations between 88%-92%, RRT is on board to manage further  - Hospice/palliative consult is placed, appreciate recommendations  - AM BMP     CHF with reduced EF 20%-25%  Cardiomyopathy  Recent hospitalization for respiratory failure 2/2 CHF. Lasix dose was recently changed to 80 BID and patient has been compliant with this change though notes increased leg swelling and weight on ED presentation; no changes in this today per patient report. BNP on admission ~6,000 (noted to be 9,600 on recent admission 9/15/23). Troponin elevated on admission to 43 but was 44 two days prior so no significant change. CHF likely contributing to respiratory failure but does not appear to be driving factor for today's deterioration and admission.   - Continue PTA Lasix 80 mg po BID and remainder of home medication regimen pending Cardiology consult  - Strict I&O's and daily weights   - Continuous pulse ox   - Supplemental O2 as needed to maintain oxygen saturations 88-92% though as above, RRT is on board for further management  - Cardiology consult per family request for ongoing discussion about transitioning patient to Bumex (discussed during recent hospitalization), appreciate recommendations      CKD stage 4  Cr 1.73 on admission, GFR 30, BUN 24.3. 9/24/23 creatinine 1.52, GFR 35. And BUN 23.5.  - AM BMP      Lower extremity wounds  - WOC consult placed 9/23/23, appreciate recommendations     Right food swelling  9/23/23 right foot XR showing that the right foot is negative for fracture, there is significant soft tissue swelling along the dorsal aspect of the foot, cellulitis could also have this appearance. Area of swelling is bruised and raised, appears to be hematoma but will continue to monitor daily for any changes. Tenderness to palpation but no surrounding erythema or warmth, and no  fevers.     Chronic conditions  Paroxysmal A. Fib: continue PTA DOAC (Xarelto) and PTA metoprolol   Lung cancer: follows with Dr. Suarez at [facility], currently oral chemo medication is on hold due to side effects   Breast cancer: follows with Dr. Suarez at [facility], continue daily PTA tamoxifen        Diet: Combination Diet Regular Diet Adult; 2 gm NA Diet; Low Saturated Fat Diet    DVT Prophylaxis: PTA DOAC  Hayes Catheter: Not present  Fluids: None  Lines: None     Cardiac Monitoring: None  Code Status: No CPR- Do NOT Intubate      Clinically Significant Risk Factors Present on Admission               # Drug Induced Coagulation Defect: home medication list includes an anticoagulant medication    # Hypertension: Noted on problem list  # Chronic heart failure with reduced ejection fraction: last echo with EF <40%  # Acute Respiratory Failure: Documented O2 saturation < 91%.  Continue supplemental oxygen as needed         # COPD: noted on problem list        Disposition Plan      Expected Discharge Date: 09/25/2023                The patient's care was discussed with the Attending Physician, Dr. Macdonald .    JOSE MIGUEL LAMAS MD  Hospitalist Service  [facility]  Securely message with Vocera (more info)  Text page via [employer] Paging/Directory   ______________________________________________________________________    Interval History   No acute events overnight.  Patient reports overall she is doing okay.  On further discussion of what preceded her presentation to the ED, daughter notes that patient has had increasing oxygen requirements at home.  Her daughter states that typically she has been maintained on 3 LPM but in recent days she has needed 5+ LPM which is greater than what her home oxygen machine even has the capacity to do.  Patient and daughter do not think there has been any change in her levels of edema since her presentation yesterday.  Patient denies any fevers, chills, other symptoms of  illness.  No concerns regarding urination or bowel movements.  Regarding the swelling/bruising on her right foot, patient does not recall any recent trauma though of note she is on anticoagulation.    Physical Exam   Vital Signs: Temp: 97.8  F (36.6  C) Temp src: Oral BP: (!) 150/71 Pulse: 103   Resp: 24 SpO2: 93 % O2 Device: Nasal cannula Oxygen Delivery: 6 LPM  Weight: 0 lbs 0 oz    Constitutional: awake, alert, comfortable-appearing why laying in bed, no acute distress  HEENT: Lids and lashes normal, sclera clear, conjunctiva normal. Normocephalic and atraumatic. No rhinorrhea. Moist mucus membranes.   Respiratory: Patient breathing comfortably on 5L O2 via oxymask, no increased work of breathing or respiratory distress, decreased breath sounds throughout but no wheezing.   Cardiovascular: Regular rate and rhythm, no murmur noted.  GI: Soft, non-distended, non-tender. Normoactive bowel sounds.  Skin: Bruising throughout extremities. Several chronic-appearing scabs/lesions and dry skin throughout bilateral lower extremities. Raised, bruised area on dorsal aspect of patient's right foot that is tender to palpation but has no surrounding erythema or warmth.  Musculoskeletal: Trace pitting edema of bilateral lower extremities.  Neurologic: Awake, alert, oriented to name, place and time. No apparent focal deficits.    Data     I have personally reviewed the following data over the past 24 hrs:    9.4  \   11.0 (L)   / 242     140 101 23.5 (H) /  125 (H)   4.1 29 1.52 (H) \     Trop: 43 (H) BNP: 6,037 (H)       Imaging results reviewed over the past 24 hrs:   Recent Results (from the past 24 hour(s))   XR Chest Port 1 View    Narrative    EXAM: XR CHEST PORT 1 VIEW  LOCATION: [facility]  DATE: 9/23/2023    INDICATION: shortness of breath, COPD vs CHF exacerbation  COMPARISON: 09/15/2023      Impression    IMPRESSION: Heart size is stable. Right midlung nodule measuring 2.5 cm, unchanged.  Decrease in linear parenchymal opacity in the left midlung. Chronic interstitial scarring/fibrosis bilaterally. There is some increasing pleural thickening in the left   upper lobe. No pneumothorax. Old right rib fracture.   XR Foot Port Right 2 Views    Narrative    EXAM: XR FOOT PORT RIGHT 2 VIEWS  LOCATION: [facility]  DATE: 9/23/2023    INDICATION: pain, bruisng, swelling over 5th metatarsal  COMPARISON: None.      Impression    IMPRESSION: The right foot is negative for fracture. There is significant soft tissue swelling along the dorsal aspect of the foot. Cellulitis could also have this appearance.

## 2023-09-24 NOTE — PLAN OF CARE
Problem: COPD (Chronic Obstructive Pulmonary Disease)  Goal: Optimal Chronic Illness Coping  Outcome: Progressing  Goal: Optimal Level of Functional [place]  Outcome: Progressing  Intervention: Optimize Functional Ability  Recent Flowsheet Documentation  Taken 9/24/2023 1259 by Elvi Francois RN  Activity Management:   activity adjusted per tolerance   ambulated in room   up in chair  Taken 9/24/2023 1100 by Elvi Francois RN  Activity Management: activity adjusted per tolerance  Taken 9/24/2023 0949 by Elvi Francois RN  Activity Management:   ambulated in room   ambulated to bathroom   up to bedside commode  Taken 9/24/2023 0858 by Elvi Francois RN  Activity Management: activity adjusted per tolerance  Goal: Improved Nutrition Intake  Outcome: Progressing  Goal: Effective Oxygenation and Ventilation  Outcome: Progressing  Intervention: Promote Airway Secretion Clearance  Recent Flowsheet Documentation  Taken 9/24/2023 1259 by Elvi Francois RN  Activity Management:   activity adjusted per tolerance   ambulated in room   up in chair  Taken 9/24/2023 1100 by Elvi Francois RN  Activity Management: activity adjusted per tolerance  Taken 9/24/2023 0949 by Elvi Francois RN  Activity Management:   ambulated in room   ambulated to bathroom   up to bedside commode  Taken 9/24/2023 0858 by Elvi Francois RN  Cough And Deep Breathing: done with encouragement  Activity Management: activity adjusted per tolerance  Intervention: Optimize Oxygenation and Ventilation  Recent Flowsheet Documentation  Taken 9/24/2023 1200 by Elvi Francois, RN  Head of Bed (HOB) Positioning: HOB at 30-45 degrees  Taken 9/24/2023 0858 by Elvi Francois RN  Head of Bed (HOB) Positioning: HOB at 30-45 degrees   Goal Outcome Evaluation:  Patient on 6L NC while seeing  in ICU. Patient is anxious and emotional. Patient at bedside of  since 1300. Rns aware.     Patient was on 4 L nasal cannula prior to seeing  in  critical condition.     Patient ambulates and gets dyspneic, 8L nasal cannula with movement is baseline, patient ambulates with assist of one and turns with assist of one.     Purwick in place with lasix on PO.     STRICT I/O!    Skin wounds- dressing cleaned and dressed.

## 2023-09-24 NOTE — CONSULTS
HEART CARE ENCOUNTER CONSULTATON NOTE      [facility]  [phone]      Assessment/Recommendations   Assessment:  1.  Acute hypoxic respiratory failure: On 3 L chronically at home.  Recent hospitalization now readmitted with recurrent respiratory failure.  Recurrent hospitalization and patient elected for DNR status at this time.  Considering hospice.    2.  Acute on chronic heart failure with reduced ejection fraction: Recommend starting IV diuresis.  Family would like to try Bumex.  Start on Bumex 1 mg IV every 8 hours  3.  Nonischemic cardiomyopathy LVEF 20-25%  4.  Paroxysmal atrial fibrillation currently in sinus rhythm  5.  Nonobstructive coronary disease  6.  COPD exacerbation  7.  Stage III chronic kidney disease  8.  Lung cancer    Plan:  1.  Start IV Bumex 1 mg every 8 hours  2.  Continue on Xarelto for anticoagulation and statin therapy  3.  Agree with palliative care consultation         History of Present Illness/Subjective    HPI: Irene León is a 78 year old female with history of nonischemic cardiomyopathy LVEF 20-25%, chronic heart failure with reduced ejection fraction, minimal coronary disease per angiogram, paroxysmal atrial fibrillation, lung cancer, COPD, chronic hypoxic respiratory failure on 3 L of home O2, chronic kidney disease who was recently admitted and diuresed and now returning with recurrent respiratory failure.  She became very dyspneic prompting readmission to the hospital.  Just discharged a few days ago.  Felt to be mainly due to COPD exacerbation.  No complaints of chest pain.    Recent Echocardiogram Results: 6/2023  The left ventricle is normal in size with mild concentric left ventricular  hypertrophy.  Left ventricular function is decreased. The ejection fraction is 20-25%  (severely reduced). There is moderate global hypokinesia of the left  ventricle.  Grade III or advanced diastolic dysfunction.  Moderately decreased right ventricular systolic  function  The left atrium is mildly dilated.  No hemodynamically significant valvular abnormalities on 2D or color flow  imaging.  Compared to the prior study of 4/8/22 there has been a decline in left  ventricular systolic function.             Physical Examination  Review of Systems   Vitals: /72 (BP Location: Right arm, Patient Position: Semi-Scanlon's, Cuff Size: Adult Regular)   Pulse 103   Temp 97.8  F (36.6  C) (Oral)   Resp 24   SpO2 93%   BMI= There is no height or weight on file to calculate BMI.  Wt Readings from Last 3 Encounters:   09/19/23 51.3 kg (113 lb 1.5 oz)   09/11/23 47.2 kg (104 lb)   08/29/23 48.5 kg (107 lb)       General Appearance:    normal body habitus   ENT/Mouth: membranes moist, no oral lesions or bleeding gums.      EYES:  no scleral icterus, normal conjunctivae   Neck: no carotid bruits or thyromegaly   Chest/Lungs:   Decreased breath sounds   Cardiovascular:   Regular. Normal first and second heart sounds with no murmur + edema bilaterally        Extremities: no cyanosis or clubbing   Skin: no xanthelasma, warm.    Neurologic: normal  bilateral, no tremors     Psychiatric: alert and oriented x3, calm        Please refer above for cardiac ROS details.        Medical History  Surgical History Family History Social History   Past Medical History:   Diagnosis Date    ANATOLIY (acute kidney injury) (H)     Allergic rhinitis     Arrhythmia     Atrial fibrillation with RVR (H)     Bacteremia     Breast cancer (H) 2017    Cardiomyopathy (H)     Centrilobular emphysema (H)     CHF (congestive heart failure) (H)     Chronic kidney disease     CKD (chronic kidney disease)     COPD (chronic obstructive pulmonary disease) (H)     Coronary artery disease     Depression     Dysphagia     E. coli sepsis (H)     Factor 5 Leiden mutation, heterozygous (H)     GERD (gastroesophageal reflux disease)     Heart failure with reduced ejection fraction (H) 04/17/2023    Hx of radiation therapy 2017     Hyperlipidemia     Hypertension     Hypokalemia     Hypomagnesemia     Idiopathic cardiomyopathy (H)     Left hip pain 04/30/2014    OAB (overactive bladder)     Osteoporosis     Sacral insufficiency fracture     Sinusitis     Syncope     Urge incontinence     Vocal cord dysfunction      Past Surgical History:   Procedure Laterality Date    ARTHROPLASTY REVISION HIP Left     BIOPSY BREAST Right 2017    BLADDER SURGERY      bladder interstim with removal    CARDIAC CATHETERIZATION      CATARACT EXTRACTION Left 07/18/2017    IR ABDOMINAL AORTOGRAM  4/16/2003    IR AORTIC ARCH 4 VESSEL ANGIOGRAM  4/16/2003    IR MISCELLANEOUS PROCEDURE  4/16/2003    LUMPECTOMY BREAST Left 06/2017    x2    PICC DOUBLE LUMEN PLACEMENT  4/11/2022         PICC TRIPLE LUMEN PLACEMENT  4/7/2022         ZZC OPEN TX FEMORAL FRACTURE DISTAL MED/LAT CONDYLE Left 10/28/2015    Procedure: OPEN REDUCTION INTERNAL FIXATION LEFT  PROXIMAL FEMUR PERIPROSTHETIC FRACTURE;  Surgeon: Yovanny Albarran MD;  Location: [facility] Main OR;  Service: Orthopedics     Family History   Problem Relation Age of Onset    Osteoporosis Other     Prostate Cancer Brother     Breast Cancer Maternal Aunt         age thought to be in her 70's-80's    Prostate Cancer Maternal Uncle         Social History     Socioeconomic History    Marital status:      Spouse name: Not on file    Number of children: Not on file    Years of education: Not on file    Highest education level: Not on file   Occupational History    Not on file   Tobacco Use    Smoking status: Former     Types: Cigarettes     Quit date: 10/28/2007     Years since quitting: 15.9    Smokeless tobacco: Former     Quit date: 9/6/2004   Vaping Use    Vaping Use: Former   Substance and Sexual Activity    Alcohol use: No    Drug use: No    Sexual activity: Not on file   Other Topics Concern    Not on file   Social History Narrative     and lives in home with 3 flight of steps     Social Determinants of  Health     Financial Resource Strain: Not on file   Food Insecurity: Not on file   Transportation Needs: Not on file   Physical Activity: Not on file   Stress: Not on file   Social Connections: Not on file   Interpersonal Safety: Not on file   Housing Stability: Not on file           Medications  Allergies   No current outpatient medications on file.       Allergies   Allergen Reactions    Sulfa (Sulfonamide Antibiotics) [Sulfa Antibiotics] Rash     Headaches and dizziness.    Homeopathic Drugs [External Allergen Needs Reconciliation - See Comment] Unknown     runny nose    Mold Extracts [Molds & Smuts] Unknown    Morphine (Pf) [Morphine] Unknown     hallucinate    Lisinopril Itching, Cough and Unknown     cough    Sulfacetamide Sodium [Sulfacetamide] Rash          Lab Results    Chemistry/lipid CBC Cardiac Enzymes/BNP/TSH/INR   No results for input(s): CHOL, HDL, LDL, TRIG, CHOLHDLRATIO in the last 38364 hours.  No results for input(s): LDL in the last 83652 hours.  Recent Labs   Lab Test 09/24/23  0635      POTASSIUM 4.1   CHLORIDE 101   CO2 29   *   BUN 23.5*   CR 1.52*   GFRESTIMATED 35*   MESSI 8.8     Recent Labs   Lab Test 09/24/23  0635 09/23/23  1611 09/21/23  1039   CR 1.52* 1.73* 1.85*     No results for input(s): A1C in the last 15109 hours.       Recent Labs   Lab Test 09/23/23  1611   WBC 9.4   HGB 11.0*   HCT 36.0   MCV 93        Recent Labs   Lab Test 09/23/23  1611 09/21/23  1039 09/18/23  0430   HGB 11.0* 11.1* 10.6*    Recent Labs   Lab Test 08/27/23  0608 08/27/23  0101 07/03/23  1125   TROPONINI 0.07 0.07 0.04     Recent Labs   Lab Test 09/23/23  1611 09/15/23  1953 08/27/23  0608 08/27/23  0101 07/03/23  1125   BNP  --   --  588* 456* 200*   NTBNP 6,037* 9,602*  --   --   --      No results for input(s): TSH in the last 62353 hours.  Recent Labs   Lab Test 08/27/23  0101 06/17/23  1217 02/20/21  2040   INR 1.05 1.28* 1.16*        Sahara Farfan,  MD

## 2023-09-24 NOTE — PROGRESS NOTES
Pt requests to use purwick though she is able to tell me when she has to go.   Patient educated on skin break down and importance of moving

## 2023-09-24 NOTE — PROVIDER NOTIFICATION
09/24/23 4:00 PM  Pt on ICU with . Due for bumex, called to ask if ok to skip this dose.    09/24/23 4:04 PM  Received call back. Ok to skip bumex dose per BFM.

## 2023-09-24 NOTE — H&P
[facility]    History and Physical - Hospitalist Service       Date of Admission:  9/23/2023    Assessment & Plan      Irene León is a 78 year old female admitted on 9/23/2023. She has a history of CHF with reduced ejection fraction (most recently EF 20-25% on 6/22), cardiomyopathy, severe COPD with chronic home oxygen 3 L at rest and 5-6 L with exertion, CAD, paroxysmal A-fib on Xarelto, CKD stage IV, lung cancer on oral chemo and breast cancer on oral chemo who is admitted for acute hypoxic respiratory failure.     Acute on Chronic hypoxic Respiratory Failure   COPD exacerbation   Patient with history of severe COPD on home oxygen on 3 L O2 at rest and 5-6 L with exertion. Acutely SOB at home on day of admission requiring BiPAP by EMS. VBG on arrival with pH 7.42 with CO2 of 56. Recevied duonebs x2 and solumedrol in ED, transitioned to 5L O2 via oxymask where she is now stable, saturating well, and asymptomatic. Has been hospitalized for respiratory failure several times over the last few months, COPD determined to be the largest contributing etiology most recently in July 2023, likely multifactorial with CHF contributing as below. As patient responded well to largely COPD interventions, this is likely largest contributor to acute decompensation today. Patient was previously full code and transitioned to DNR/DNI status after much discussion in ED, open to hospice consult.   - Admit to inpatient   - continue PTA inhalers, mucomyst, tessalon perles  - RCAT   - Duonebs and Mucomyst PRN   - was given solumedrol in ER; will transition with prednisone 40mg   - start doxy and ceftriaxone   - Respiratory panel   - sputum cultures if able to collect   - strict I&O's and daily weights   - continuous pulse ox   - supplemental O2 as needed to maintain oxygen saturations > 90%  - hospice consult   - AM BMP    CHF with reduced EF 20%-25%  Cardiomyopathy  Recent hospitalization for respiratory  failure 2/2 CHF, lasix dose was recently changed to 80 BID and patient has been compliant with this change though notes increased leg swelling and weight today. BNP on admission today ~6,000 (noted to be 9,600 on recent admission 9/15). Troponin elevated today to 43, was 44 two days prior so no significant change. Likely contributing to respiratory failure but does not appear to be driving factor for today's deterioration and admission.   - Continue PTA Lasix 80 mg po BID and other meds  - strict I&O's and daily weights   - continuous pulse ox   - supplemental O2 as needed to maintain oxygen saturations > 90%  - cardiology consult for ongoing discussion about transitioning patient to bumex per family request  (discussed during recent hospitalization)     CKD stage 4  Cr 1.73 on admission, GFR 30, BUN 24.3.   - AM BMP     Lower extremity wounds  - wound care consult     Chronic conditions  Paroxysmal A. Fib: continue PTA DOAC (Xarelto) and PTA metoprolol   Lung cancer: follows with Dr. Suarez at [facility], currently oral chemo medication is on hold due to side effects   Breast cancer: follows with Dr. Suarez at [facility], continue daily PTA tamoxifen         Diet: Combination Diet Regular Diet Adult; 2 gm NA Diet; Low Saturated Fat Diet  DVT Prophylaxis: PTA DOAC   Hayes Catheter: Not present  Fluids: po  Lines: None     Cardiac Monitoring: None  Code Status: No CPR- Do NOT Intubate    Clinically Significant Risk Factors Present on Admission                 # Drug Induced Coagulation Defect: home medication list includes an anticoagulant medication      # Hypertension: Noted on problem list    # Chronic heart failure with reduced ejection fraction: last echo with EF <40%         # COPD: noted on problem list        Disposition Plan      Expected Discharge Date: 09/25/2023                The patient's care was discussed with the Attending Physician, Dr. Macdonald who will see the patient tomorrow (9/24) .      Lynn Lane DO  PGY2  Hospitalist Service  [facility]  Securely message with [employer] (more info)  Text page via [employer] Paging/Directory   ______________________________________________________________________    Chief Complaint   Shortness of breath     History is obtained from the patient and her daughter, Leelee     History of Present Illness   Irene León is a 78 year old female who has a history of CHF with reduced ejection fraction (most recently EF 20-25% on 6/22), cardiomyopathy, severe COPD with chronic home oxygen 3 L at rest and 5-6 L with exertion, CAD, paroxysmal A-fib on Xarelto, CKD stage IV, lung cancer on oral chemo and breast cancer on oral chemo who is admitted for acute hypoxic respiratory failure.     Per chart review, patient was admitted at [facility] from 9/15-9/19/2023 for evaluation of decompensated HFrEF. Patient initially presented to the ED with worsening lower extremity edema with weeping open blisters on her left leg for 1 week. Labs in ED showed elevated proBNP and troponin was mildly elevated at 38. Chest Xray showed increase in patchy infiltrate in the left midlung. ECG showed LBBB, PVCs, prolonged QTc. Patient was given IV lasix and admitted for CHF and COPD exacerbation. During admission, cardiology was consulted and recommended aggressive IV diuresis Lasix 80 mg every 8 hours. Patient responded well to IV diuresis and also improved in her oxygen requirement approximating her baseline oxygen requirement by discharge. Patient was recommended to be discharged to TCU but she ultimately declined adn was discharged home with follow up with: PCP in 1 week, wound care referral, and cardiology in 2 weeks. She was discharged with a changed dose of lasix 80 mg BID.     Patient was at home on day of admission and had difficulty with shortness of breath both at rest and with exertion climbing stairs to her second level. Her adult children noticed that she  was not urinating as frequently as she typically does during a visit from her sisters and subsequently weighed their mom and noticed she was up a few pounds from her previous weight a few days ago. She became acutely short of breath while sitting and EMS was called.     EMS initially placed her on a non-rebreather mask with success but on the way to the hospital she decompensated again and was transitioned to BiPAP.     In the ED, patient was stabilized with duonebs x2 and IV solumedrol 125 mg. Transitioned to 5 LPM via oxymask, consistent with home O2 requirements with exertion. She was found to have a pro BNP of 6,000 (down from 9,600 on previous admission 9/15) and troponin 44, stable with prior trop of 43 just a few days ago.       Of note, patient's  was admitted to [facility] ICU earlier today.       Past Medical History    Past Medical History:   Diagnosis Date    ANATOLIY (acute kidney injury) (H)     Allergic rhinitis     Arrhythmia     Atrial fibrillation with RVR (H)     Bacteremia     Breast cancer (H) 2017    Cardiomyopathy (H)     Centrilobular emphysema (H)     CHF (congestive heart failure) (H)     Chronic kidney disease     CKD (chronic kidney disease)     COPD (chronic obstructive pulmonary disease) (H)     Coronary artery disease     Depression     Dysphagia     E. coli sepsis (H)     Factor 5 Leiden mutation, heterozygous (H)     GERD (gastroesophageal reflux disease)     Heart failure with reduced ejection fraction (H) 04/17/2023    Hx of radiation therapy 2017    Hyperlipidemia     Hypertension     Hypokalemia     Hypomagnesemia     Idiopathic cardiomyopathy (H)     Left hip pain 04/30/2014    OAB (overactive bladder)     Osteoporosis     Sacral insufficiency fracture     Sinusitis     Syncope     Urge incontinence     Vocal cord dysfunction        Past Surgical History   Past Surgical History:   Procedure Laterality Date    ARTHROPLASTY REVISION HIP Left     BIOPSY BREAST Right 2017    BLADDER  SURGERY      bladder interstim with removal    CARDIAC CATHETERIZATION      CATARACT EXTRACTION Left 07/18/2017    IR ABDOMINAL AORTOGRAM  4/16/2003    IR AORTIC ARCH 4 VESSEL ANGIOGRAM  4/16/2003    IR MISCELLANEOUS PROCEDURE  4/16/2003    LUMPECTOMY BREAST Left 06/2017    x2    PICC DOUBLE LUMEN PLACEMENT  4/11/2022         PICC TRIPLE LUMEN PLACEMENT  4/7/2022         ZZC OPEN TX FEMORAL FRACTURE DISTAL MED/LAT CONDYLE Left 10/28/2015    Procedure: OPEN REDUCTION INTERNAL FIXATION LEFT  PROXIMAL FEMUR PERIPROSTHETIC FRACTURE;  Surgeon: Yovanny Albarran MD;  Location: [facility] OR;  Service: Orthopedics       Prior to Admission Medications   Prior to Admission Medications   Prescriptions Last Dose Informant Patient Reported? Taking?   Azelastine HCl 137 MCG/SPRAY SOLN Unknown  Yes Yes   Sig: Spray 1 spray into both nostrils 2 times daily as needed for rhinitis   CALCIUM-MAGNESIUM-ZINC PO 9/23/2023 at 0900  Yes Yes   Sig: Take 1 tablet by mouth daily   Fluticasone-Umeclidin-Vilanterol (TRELEGY ELLIPTA) 200-62.5-25 MCG/INH oral inhaler 9/22/2023 at 2100  Yes Yes   Sig: Inhale 1 puff into the lungs At Bedtime   HYDROmorphone, STANDARD CONC, (DILAUDID) 1 MG/ML oral solution Unknown  Yes Yes   Sig: Take 1 mg by mouth 3 times daily as needed for pain   Melatonin 5 MG TBDP 9/22/2023 at 2100  Yes Yes   Sig: Take 15 mg by mouth At Bedtime   OLANZapine (ZYPREXA) 5 MG tablet 9/22/2023 at 2100  No Yes   Sig: Take 1 tablet (5 mg) by mouth At Bedtime   acetaminophen (TYLENOL) 500 MG tablet Unknown  Yes Yes   Sig: Take 1,000 mg by mouth every 8 hours as needed for mild pain or fever    acetylcysteine (MUCOMYST) 10 % nebulizer solution Unknown  Yes Yes   Sig: Inhale 4 mLs into the lungs every 4 hours as needed for mucolysis/respiratory distress   albuterol (PROAIR HFA/PROVENTIL HFA/VENTOLIN HFA) 108 (90 Base) MCG/ACT inhaler Unknown  Yes Yes   Sig: Inhale 2 puffs into the lungs every 6 hours as needed for shortness of  breath, wheezing or cough   benzonatate (TESSALON PERLES) 100 MG capsule Unknown  No Yes   Sig: Take 1 capsule (100 mg) by mouth 3 times daily as needed for cough   cetirizine (ZYRTEC) 5 MG tablet Unknown  No Yes   Sig: Take 1 tablet (5 mg) by mouth daily as needed for allergies   chlorhexidine (PERIDEX) 0.12 % solution Unknown  Yes Yes   Sig: [CHLORHEXIDINE (PERIDEX) 0.12 % SOLUTION] Apply 15 mL to the mouth or throat at bedtime as needed.    escitalopram (LEXAPRO) 10 MG tablet 9/23/2023 at 0900  No Yes   Sig: Take 1 tablet (10 mg) by mouth daily   famotidine (PEPCID) 20 MG tablet 9/21/2023 at 2200  No No   Sig: Take 1 tablet (20 mg) by mouth every other day   fluticasone (FLONASE) 50 MCG/ACT nasal spray Unknown  Yes Yes   Sig: Spray 1 spray into both nostrils daily as needed for rhinitis or allergies   furosemide (LASIX) 80 MG tablet 9/23/2023 at 1400  No Yes   Sig: Take 1 tablet (80 mg) by mouth 2 times daily   gabapentin (NEURONTIN) 600 MG tablet 9/22/2023 at 2100  Yes Yes   Sig: Take 600 mg by mouth At Bedtime   ipratropium (ATROVENT HFA) 17 MCG/ACT inhaler Unknown  Yes Yes   Sig: Inhale 2 puffs into the lungs every 6 hours as needed for wheezing   ipratropium - albuterol 0.5 mg/2.5 mg/3 mL (DUONEB) 0.5-2.5 (3) MG/3ML neb solution Unknown  Yes Yes   Sig: Take 1 vial by nebulization every 6 hours as needed for shortness of breath, wheezing or cough   metoprolol succinate ER (TOPROL XL) 50 MG 24 hr tablet 9/22/2023 at 2100  No Yes   Sig: Take 1.5 tablets (75 mg) by mouth At Bedtime   mirabegron (MYRBETRIQ) 25 MG 24 hr tablet 9/23/2023 at 0900  No Yes   Sig: Take 1 tablet (25 mg) by mouth daily   multivitamin w/minerals (THERA-VIT-M) tablet 9/23/2023 at 0900  Yes Yes   Sig: Take 1 tablet by mouth daily   nystatin (MYCOSTATIN) 013886 UNIT/GM external cream Unknown  Yes Yes   Sig: Apply topically 3 times daily as needed for dry skin   prochlorperazine (COMPAZINE) 10 MG tablet Unknown  No Yes   Sig: Take 1 tablet (10  mg) by mouth every 6 hours as needed (Nausea/Vomiting)   rivaroxaban ANTICOAGULANT (XARELTO) 15 MG TABS tablet 9/22/2023 at 2100  Yes Yes   Sig: Take 1 tablet (15 mg) by mouth At Bedtime   simvastatin (ZOCOR) 40 MG tablet 9/22/2023 at 2100  No Yes   Sig: Take 1 tablet (40 mg) by mouth At Bedtime   sodium chloride 0.9 % neb solution Unknown  Yes Yes   Sig: Take 3 mLs by nebulization every 3 hours as needed for wheezing   solifenacin (VESICARE) 5 MG tablet 9/22/2023 at 0900  No Yes   Sig: Take 1 tablet (5 mg) by mouth daily   sotorasib (LUMAKRAS) 120 MG tablet 9/20/2023 at 0900  No Yes   Sig: Take 8 tablets (960 mg) by mouth daily for 30 days Do not chew, crush or split tablets.   spironolactone (ALDACTONE) 25 MG tablet 9/23/2023 at 0900  No Yes   Sig: Take 1 tablet (25 mg) by mouth daily   tamoxifen (NOLVADEX) 20 MG tablet 9/23/2023 at 0900  No Yes   Sig: Take 1 tablet (20 mg) by mouth daily   traMADol (ULTRAM) 50 MG tablet Unknown  Yes Yes   Sig: Take 50 mg by mouth 2 times daily as needed for severe pain   vitamin D3 (CHOLECALCIFEROL) 50 mcg (2000 units) tablet 9/23/2023 at 0900  Yes Yes   Sig: Take 50 mcg by mouth daily   zinc oxide (DESITIN) 40 % external ointment Unknown  No Yes   Sig: Apply topically as needed for dry skin or irritation (Twice a day to gluteal area skin irritation)      Facility-Administered Medications: None        Social History   I have reviewed this patient's social history and updated it with pertinent information if needed.  Social History     Tobacco Use    Smoking status: Former     Types: Cigarettes     Quit date: 10/28/2007     Years since quitting: 15.9    Smokeless tobacco: Former     Quit date: 9/6/2004   Vaping Use    Vaping Use: Former   Substance Use Topics    Alcohol use: No    Drug use: No        Physical Exam   Vital Signs:     BP: 102/57 Pulse: 86   Resp: 25 SpO2: 91 % O2 Device: Oxymask Oxygen Delivery: 5 LPM  Weight: 0 lbs 0 oz    Constitutional: lying in bed asleep but  easily awoken, alert and cooperative for history and exam, no apparent distress, appears stated age  Eyes: Lids and lashes normal, extra ocular muscles intact, sclera clear, conjunctiva normal   Respiratory: Patient breathing comfortably on 5L O2 via oxymask, no increased work of breathing or respiratory distress, decreased breath sounds throughout but no wheezing   Cardiovascular: Regular rate and rhythm, no murmur noted  GI: Soft, non-distended, non-tender  Skin: bruising throughout extremities, several scabs and dry skin throughout bilateral lower extremities, blood blister on dorsal aspect of patient's right foot  Musculoskeletal: trace pitting edema of bilateral lower extremities, there is no redness, warmth, or swelling of the joints  Neurologic: Awake, alert, oriented to name, place and time.  Cranial nerves II-XII are grossly intact.      Medical Decision Making       Please see A&P for additional details of medical decision making.      Data     I have personally reviewed the following data over the past 24 hrs:    9.4  \   11.0 (L)   / 242     138 98 24.3 (H) /  103 (H)   3.6 32 (H) 1.73 (H) \     Trop: 43 (H) BNP: 6,037 (H)       Imaging results reviewed over the past 24 hrs:   Recent Results (from the past 24 hour(s))   XR Chest Port 1 View    Narrative    EXAM: XR CHEST PORT 1 VIEW  LOCATION: [facility]  DATE: 9/23/2023    INDICATION: shortness of breath, COPD vs CHF exacerbation  COMPARISON: 09/15/2023      Impression    IMPRESSION: Heart size is stable. Right midlung nodule measuring 2.5 cm, unchanged. Decrease in linear parenchymal opacity in the left midlung. Chronic interstitial scarring/fibrosis bilaterally. There is some increasing pleural thickening in the left   upper lobe. No pneumothorax. Old right rib fracture.   XR Foot Port Right 2 Views    Narrative    EXAM: XR FOOT PORT RIGHT 2 VIEWS  LOCATION: [facility]  DATE: 9/23/2023    INDICATION:  pain, bruisng, swelling over 5th metatarsal  COMPARISON: None.      Impression    IMPRESSION: The right foot is negative for fracture. There is significant soft tissue swelling along the dorsal aspect of the foot. Cellulitis could also have this appearance.

## 2023-09-24 NOTE — PLAN OF CARE
Problem: Plan of Care - These are the overarching goals to be used throughout the patient stay.    Goal: Optimal Comfort and Wellbeing  Outcome: Progressing     Patient A&O and on 6L NC. Patient has been on ICU with  since getting to the floor. Rounding on patient, brought her dinner tray. Up with standby assist/a of 1. Had a BM today, voiding as well. Continuous pulse ox on. Sitting comfortably in room with  and family. ICU nurses aware of patient being there.  Kaylee Wilson, RN

## 2023-09-24 NOTE — PLAN OF CARE
Chief Complaint   Patient presents with   • Cough     BIB mother. Pt running in triage which seems to be aggravating his cough. NAD, VSS.      Will wait in waiting room, parent aware to notify RN of any changes in pt status.       Pt is A&Ox4. Pt denied pain during shift thus far. BLE milana w/ scabs and bruising. Dressing on LLE CDI. Saline locked. Voiding adequately with purewick in place. Education on medication administration, alarms, and use of call-light to reduce risk for falls and injury. VSS on 5L O2 (weaned from 8L), denies chest pain and nausea.

## 2023-09-24 NOTE — CONSULTS
"Care Management Initial Consult    General Information  Assessment completed with: Patient, daughter Leelee briefly via phone per patient request. Patient shared her  is currently at [facility] on ICU in Room 326. Patient shared her daughter Leelee should be primary contact for decision making and discharge planning for herself and for her  but she wishes to be involved in both. CM informed daughter Leelee that CM is available to assist with discharge planning as indicated.     Type of CM/SW Visit: Initial Assessment    Primary Care Provider verified and updated as needed: Yes   Readmission within the last 30 days: unable to assess ([facility] 9/15-9/19)      Reason for Consult: discharge planning  Advance Care Planning: Advance Care Planning Reviewed:  (states has HCD at home,  or daughter Leelee are HCA's. Patient verbally states daughter Leelee should be primary contact for decision making and discharge planning.)        Communication Assessment  Patient's communication style: spoken language (English or Bilingual)        Cognitive  Cognitive/Neuro/Behavioral: alert, able to provide detailed history, oriented.     Living Environment:   People in home: spouse     Current living Arrangements: house (3 level split home- has steps to all levels)      Able to return to prior arrangements: yes     Family/Social Support:  Care provided by: self (except does not drive- sister or friends do the driving.)  Provides care for: no one, unable/limited ability to care for self  Marital Status:   , Children, Sibling(s) ( has dementia but fairly independent. Daughter Leelee. Son Yovanny. 3 sisters. Friends.)  Santos \"Al\" Mau       Description of Support System: Supportive, Involved       Current Resources:   Patient receiving home care services: No  Community Resources:  (OP Oncology)  Equipment/supplies currently used at home:  (home 02 supplied by [employer] (3L continuous and has " portable), cane, Rx glasses.)    Employment/Financial:  Employment Status: retired     Employment/ Comments:  20 years retired [employer]- she has  for Life  Financial Concerns: No concerns identified   Referral to Financial Worker: No     Does the patient's insurance plan have a 3 day qualifying hospital stay waiver?  Yes   Will the waiver be used for post-acute placement? No- unable to confirm at this time. Patient indicated preference to return home at hospital discharge. Per Epic, is Medicare ACO REACH eligible if TCU placement is indicated. Currently IP status and recent qualifying IP stay (9/15-9/19). Final dispo TBD.     Lifestyle & Psychosocial Needs:  Social Determinants of Health     Food Insecurity: Not on file   Depression: At risk (7/12/2023)    PHQ-2     PHQ-2 Score: 5   Housing Stability: Not on file   Tobacco Use: Medium Risk (9/23/2023)    Patient History     Smoking Tobacco Use: Former     Smokeless Tobacco Use: Former     Passive Exposure: Not on file   Financial Resource Strain: Not on file   Alcohol Use: Not on file   Transportation Needs: Not on file   Physical Activity: Not on file   Interpersonal Safety: Not on file   Stress: Not on file   Social Connections: Not on file     Functional Status:  Prior to admission patient needed assistance:   Dependent ADLs:: Independent, Ambulation-cane  Dependent IADLs:: Independent, Transportation, Money Management, Medication Management, Cleaning, Cooking (has cleaning lady. daughter helps with meals, shopping, transportation, medication set up and finances.)     Mental Health Status:   -hx of anxiety. Denies MH concerns. Tearful at times when discussing her and her 's current medical conditions and hopeful return home with added hospice services. Requested hospital  to meet with her and  at hospital today (bedside RN coordinating).        Chemical Dependency Status:     -denies CD concerns.     "    Values/Beliefs:  Spiritual, Cultural Beliefs, Hindu Practices, Values that affect care: no          Values/Beliefs Comment: \"Yarsani\"    Additional Information:  Chart reviewed. Cardiology, palliative care and WOC consults pending. IV abx.     CM met with patient; assessment completed. CM also spoke with daughter Leelee briefly.     Lives with  \"Al\" in private 3 level split home. She is independent with most cares, except has not been driving for past year (children, sister's or friends help with this).  has dementia. She uses a cane for ambulation. They have hired housecleaning services. Daughter has been setting up medications for patient and . Daughter helps with meals, shopping and transportation. No private duty or skilled HC services at this time. PCP confirmed. OP Oncology with [facility]- Dr. Suarez. She is currently taking oral chemo. Home 02 at 3L continuous supplied by [facility] (has portable available).     There is consult to CM re: hospice.   Patient initially shared she was hopeful that her and  would return home at discharge from hospital and likely added hospice services for both.   Daughter is aware CM is available to assist with discharge planning and has phone numbers for floor CM and ER CM.     12:54 PM  Patient going up to see her  at this time.  was contacted by bedside RN per patient request to meet the family up in husbands room.     CM to continue to follow and assist with discharge planning as indicated.     Alverto Mckoy is primary family contact.     Kimmie Hillman RN        "

## 2023-09-24 NOTE — PROGRESS NOTES
Received report from ED. Pt in room with  on ICU. On 6L of oxygen stating at 92%. Oxygen with continuous pulse ox is with her. Informed nurses on ICU to call if need anything. Will check on patient throughout shift.  Kaylee Wilson RN

## 2023-09-24 NOTE — TREATMENT PLAN
RCAT Treatment Plan    Patient Score: 10  Patient Acuity: 4    Clinical Indication for Therapy: history of bronchospasm    Therapy Ordered: Duoneb Q6H prn    Assessment Summary: Patient is alert/oriented and is supplemental O2 dependent. Increased work of breathing noted but pt is currently on 8 lpm O2 by oxymask. RT following.    Jonel Harper, RT  9/23/2023

## 2023-09-25 NOTE — CONSULTS
"Palliative Care Consultation Note  [facility]      Patient: Irene León  Date of Admission:  9/23/2023    Requesting Clinician / Team: Dr. Lane  Reason for consult: \"end of life discussions and cares\"     Recommendations & Counseling     GOALS OF CARE:   Currently goals are life prolonging with limits-DNR/DNI.  Will need to have further discussion in the coming days.  She shared that her  passed away last night, and is not up for much discussion today.  She would like to have her daughter present, but is unavailable today.    Stephanie shared that her hope is to go home at discharge, but likely will need additional help/home care.  We did not discuss option of hospice care, but will plan to do so when daughter is present.    Stephanie has a strong merline and identifies as Mu-ism.  She would be open to spiritual care visit.    Palliative care will continue to follow.  Recommend to continue to follow with outpatient palliative care if not signing on with hospice.    ADVANCE CARE PLANNING:  No health care directive on file.    POLST on file, may need to revise if goals of care change. Currently POLST documents DNR and selective medical treatments.  Code status: No CPR- Do NOT Intubate    MEDICAL MANAGEMENT:   # Shortness of breath secondary to advanced COPD with acute exacerbation, CHF  Currently on 5 lpm, baseline is 3 lpm.  IV bumex started 9/24.  -Continue with oxygen, nebs, inhalers, antibiotics, steroids, diuretics  -Currently managed by primary team and cardiology    #Generalized weakness and fatigue secondary to the above   -Consider PT/OT evaluations to evaluate strength and current functional abilities.     #history of Chronic pain 2/2 compression fracture  -Currently denies pain  -Continue current regimen of gabapentin 600 mg at HS and tramadol 50 mg BID PRN  -Continue to monitor    #Insomnia  Continues with zyprexa 5 mg at HS and melatonin 10 mg at HS PRN  -Would use caution " with adding any other medications that can prolong QTC.  Would avoid benzodiazepine due to potential adverse effects.  If goals of care transition to comfort focused, then weigh risk vs benefit.      PSYCHOSOCIAL/SPIRITUAL SUPPORT:  Spiritual care consult per patient request    Palliative Care will continue to follow. Thank you for the consult and allowing us to aid in the care of Irene León.    These recommendations have been discussed with resident MD. Sahara Mercedes, CNS  Securely message with Voc[employer] (more info)  Text page via [facility] Paging/Directory       Assessment      Irene León is a 78 year old female with history of nonischemic cardiomyopathy LVEF 20-25%, chronic heart failure with reduced ejection fraction, minimal coronary disease per angiogram, paroxysmal atrial fibrillation, lung cancer, COPD, chronic hypoxic respiratory failure on 3 L of home O2, chronic kidney disease who presented to the ED on 9/23/23 with shortness of breath.  Admitted to the hospital with acute on chronic hypoxic respiratory failure, COPD exacerbation, CHF with EF 20-25%.  Cardiology following, patient currently on IV bumex but held due to hypotension.  Oxygen needs are 5 lpm which is higher than her home baseline.     Recurrent hospitalizations-this is her 5th admission since 6/2023.   She is followed in outpatient palliative care clinic, last seen on 8/15/23.    Today, the patient was seen for:  Goals of care, palliative care initial inpatient visit, symptom assessment    Palliative Care Summary:   Met with patient and 2 sisters in room.   Introduced the role of palliative care as an interdisciplinary team that cares for patients with serious illness to help support symptom management, communication, coping for patients and their families as well as support with medical decision making.    Prognosis, Goals, & Planning:    Functional Status just prior to this current hospitalization:  ECOG2 (Ambulatory and capable of  "all selfcare but unable to carry out any work activities; may need help with IADLs up and about > 50% of waking hours)  Patient states she was living independently with her  prior to admission, states they \"help each other\" but probably could use more help at home.  Did not have skilled home care services in place.     Prognosis, Goals, and/or Advance Care Planning:  Limited discussion today as patient states she would like to have daughter present.  Also mourning death of  ( last night) and daughter is busy working on planning  arrangements.      Code Status: DNR/DNI    Patient's decision making preferences: shared with support from loved ones        Patient has decision-making capacity today for complex decisions:Intact            Coping, Meaning, & Spirituality:   Mood, coping, and/or meaning in the context of serious illness were addressed today: Yes Patient shared that her  passed away last night at [facility].  States his death was not unexpected but still difficult and is mourning.  States \"we will get through it, we always do.\"  She is Gnosticist and states communion,  or  visits would be helpful in \"giving me an extra boost of strength.\"      Social:   Living situation:lives with significant other/spouse    Medications:  I have reviewed this patient's medication profile and medications from this hospitalization.      ROS:  Comprehensive ROS is reviewed and is negative except as here & per HPI: Denies pain other than mild discomfort in her feet.  Feet were very edematous on admission and now much swollen, but still painful.  No constipation or diarrhea.  No shortness of breath currently, but typically short of breath with activity.  Reports having difficulty sleeping due to anxiety, thinking about planning, grieving the loss of her , her own health issues    Physical Exam   Vital Signs with Ranges  Temp:  [97.4  F (36.3  C)-97.8  F (36.6  C)] 97.4  F " (36.3  C)  Pulse:  [] 95  Resp:  [17-36] 17  BP: ()/(43-72) 97/53  SpO2:  [89 %-97 %] 93 %  107 lbs 1.6 oz    PHYSICAL EXAM:  Constitutional: Alert, sitting in recliner chair, NAD  Respiratory: Breathing nonlabored at rest  NEURO: Alert and oriented X 4    Data reviewed:  Recent Labs   Lab 09/25/23  0558 09/24/23  0635 09/23/23  1611 09/21/23  1039   WBC 10.8  --  9.4 9.2   HGB 9.7*  --  11.0* 11.1*   MCV 93  --  93 95     --  242 260    140 138 142   POTASSIUM 3.7 4.1 3.6 3.7   CHLORIDE 105 101 98 102   CO2 30* 29 32* 30*   BUN 27.5* 23.5* 24.3* 35.1*   CR 1.59* 1.52* 1.73* 1.85*   ANIONGAP 9 10 8 10   MESSI 8.4* 8.8 9.2 9.2   GLC 95 125* 103* 94   ALBUMIN  --   --   --  3.3*   PROTTOTAL  --   --   --  7.3   BILITOTAL  --   --   --  0.3   ALKPHOS  --   --   --  47   ALT  --   --   --  <5   AST  --   --   --  25       90 MINUTES SPENT BY ME on the date of service doing chart review, history, exam, documentation & further activities per the note.

## 2023-09-25 NOTE — PROGRESS NOTES
HEART CARE ENCOUNTER CONSULTATON NOTE      [facility]  [phone]      Assessment/Recommendations   Assessment:  1.  Acute hypoxic respiratory failure: On 3 L chronically at home.  Recent hospitalization now readmitted with recurrent respiratory failure.  Recurrent hospitalization and patient elected for DNR status at this time.   2.  Acute on chronic heart failure with reduced ejection fraction: We will give another dose of IV Bumex today and then change to p.o. Bumex 1 mg twice daily tomorrow.  She reports that she reaccumulated fluid very rapidly when she went home on oral medications recently.  3.  Nonischemic cardiomyopathy LVEF 20-25% longstanding with left bundle branch block  4.  Paroxysmal atrial fibrillation currently in sinus rhythm  5.  Nonobstructive coronary disease  6.  COPD exacerbation  7.  Stage III chronic kidney disease  8.  Lung cancer     Plan:  1.  Give another dose of IV Bumex today and then change to Bumex 1 mg p.o. twice daily tomorrow.  Would like to observe her response to oral Bumex prior to discharge home.  2.  Continue on Xarelto for anticoagulation and statin therapy  3.  Consider outpatient appointment with EP to discuss CRT device.  Patient has advanced COPD and unclear if she would be a candidate.  Recommend follow-up with Dr. Chilel her cardiologist as outpatient           History of Present Illness/Subjective    Patient asked reports that she feels better today with improvement in breathing.  No complaints of chest pain.       Physical Examination  Review of Systems   Vitals: BP 97/53 (BP Location: Right arm)   Pulse 90   Temp 97.4  F (36.3  C) (Oral)   Resp 17   Wt 48.6 kg (107 lb 1.6 oz)   SpO2 94%   BMI 21.59 kg/m    BMI= Body mass index is 21.59 kg/m .  Wt Readings from Last 3 Encounters:   09/25/23 48.6 kg (107 lb 1.6 oz)   09/19/23 51.3 kg (113 lb 1.5 oz)   09/11/23 47.2 kg (104 lb)       General Appearance:   no distress, normal body habitus    ENT/Mouth: membranes moist, no oral lesions or bleeding gums.      EYES:  no scleral icterus, normal conjunctivae       Chest/Lungs:   decreased breath sounds   Cardiovascular:   Regular. Normal first and second heart sounds with no murmur trace edema bilaterally        Extremities: no cyanosis or clubbing   Skin: no xanthelasma, warm.    Neurologic: normal  bilateral, no tremors     Psychiatric: alert and oriented x3, calm        Please refer above for cardiac ROS details.        Medical History  Surgical History Family History Social History   Past Medical History:   Diagnosis Date    ANATOLIY (acute kidney injury) (H)     Allergic rhinitis     Arrhythmia     Atrial fibrillation with RVR (H)     Bacteremia     Breast cancer (H) 2017    Cardiomyopathy (H)     Centrilobular emphysema (H)     CHF (congestive heart failure) (H)     Chronic kidney disease     CKD (chronic kidney disease)     COPD (chronic obstructive pulmonary disease) (H)     Coronary artery disease     Depression     Dysphagia     E. coli sepsis (H)     Factor 5 Leiden mutation, heterozygous (H)     GERD (gastroesophageal reflux disease)     Heart failure with reduced ejection fraction (H) 04/17/2023    Hx of radiation therapy 2017    Hyperlipidemia     Hypertension     Hypokalemia     Hypomagnesemia     Idiopathic cardiomyopathy (H)     Left hip pain 04/30/2014    OAB (overactive bladder)     Osteoporosis     Sacral insufficiency fracture     Sinusitis     Syncope     Urge incontinence     Vocal cord dysfunction      Past Surgical History:   Procedure Laterality Date    ARTHROPLASTY REVISION HIP Left     BIOPSY BREAST Right 2017    BLADDER SURGERY      bladder interstim with removal    CARDIAC CATHETERIZATION      CATARACT EXTRACTION Left 07/18/2017    IR ABDOMINAL AORTOGRAM  4/16/2003    IR AORTIC ARCH 4 VESSEL ANGIOGRAM  4/16/2003    IR MISCELLANEOUS PROCEDURE  4/16/2003    LUMPECTOMY BREAST Left 06/2017    x2    PICC DOUBLE LUMEN PLACEMENT   4/11/2022         PICC TRIPLE LUMEN PLACEMENT  4/7/2022         ZZC OPEN TX FEMORAL FRACTURE DISTAL MED/LAT CONDYLE Left 10/28/2015    Procedure: OPEN REDUCTION INTERNAL FIXATION LEFT  PROXIMAL FEMUR PERIPROSTHETIC FRACTURE;  Surgeon: Yovanny Albarran MD;  Location: [facility] Main OR;  Service: Orthopedics     Family History   Problem Relation Age of Onset    Osteoporosis Other     Prostate Cancer Brother     Breast Cancer Maternal Aunt         age thought to be in her 70's-80's    Prostate Cancer Maternal Uncle         Social History     Socioeconomic History    Marital status:      Spouse name: Not on file    Number of children: Not on file    Years of education: Not on file    Highest education level: Not on file   Occupational History    Not on file   Tobacco Use    Smoking status: Former     Types: Cigarettes     Quit date: 10/28/2007     Years since quitting: 15.9    Smokeless tobacco: Former     Quit date: 9/6/2004   Vaping Use    Vaping Use: Former   Substance and Sexual Activity    Alcohol use: No    Drug use: No    Sexual activity: Not on file   Other Topics Concern    Not on file   Social History Narrative     and lives in home with 3 flight of steps     Social Determinants of Health     Financial Resource Strain: Not on file   Food Insecurity: Not on file   Transportation Needs: Not on file   Physical Activity: Not on file   Stress: Not on file   Social Connections: Not on file   Interpersonal Safety: Not on file   Housing Stability: Not on file           Medications  Allergies   No current outpatient medications on file.       Allergies   Allergen Reactions    Sulfa (Sulfonamide Antibiotics) [Sulfa Antibiotics] Rash     Headaches and dizziness.    Homeopathic Drugs [External Allergen Needs Reconciliation - See Comment] Unknown     runny nose    Mold Extracts [Molds & Smuts] Unknown    Morphine (Pf) [Morphine] Unknown     hallucinate    Lisinopril Itching, Cough and Unknown     cough     Sulfacetamide Sodium [Sulfacetamide] Rash          Lab Results    Chemistry/lipid CBC Cardiac Enzymes/BNP/TSH/INR   No results for input(s): CHOL, HDL, LDL, TRIG, CHOLHDLRATIO in the last 28746 hours.  No results for input(s): LDL in the last 35055 hours.  Recent Labs   Lab Test 09/25/23  0558      POTASSIUM 3.7   CHLORIDE 105   CO2 30*   GLC 95   BUN 27.5*   CR 1.59*   GFRESTIMATED 33*   MESSI 8.4*     Recent Labs   Lab Test 09/25/23  0558 09/24/23  0635 09/23/23  1611   CR 1.59* 1.52* 1.73*     No results for input(s): A1C in the last 94221 hours.       Recent Labs   Lab Test 09/25/23  0558   WBC 10.8   HGB 9.7*   HCT 31.3*   MCV 93        Recent Labs   Lab Test 09/25/23  0558 09/23/23  1611 09/21/23  1039   HGB 9.7* 11.0* 11.1*    Recent Labs   Lab Test 08/27/23  0608 08/27/23  0101 07/03/23  1125   TROPONINI 0.07 0.07 0.04     Recent Labs   Lab Test 09/23/23  1611 09/15/23  1953 08/27/23  0608 08/27/23  0101 07/03/23  1125   BNP  --   --  588* 456* 200*   NTBNP 6,037* 9,602*  --   --   --      No results for input(s): TSH in the last 55924 hours.  Recent Labs   Lab Test 08/27/23  0101 06/17/23  1217 02/20/21  2040   INR 1.05 1.28* 1.16*        Sahara Farfan MD

## 2023-09-25 NOTE — PLAN OF CARE
Goal Outcome Evaluation:       Patient alert and oriented x4 today, forgetful at times.  Patient's family at bedside.  Patient's BP 87/57, p93 this afternoon, BFM resident, florence Gonzalez and one time dose IV lasix to be held due to low Bps and parameters being placed to hold if SBP <90, no further orders obtained.  Nursing will continue to watch BP.  Telemetry order obtained and set up, [facility] Charge RN aware and to read tele.  Patient's oxygen 90s on 5L per nasal cannula, continuous oximetry in use.  Patient's lower posterior lung lobes had expiratory coarseness.  Encouraged patient to turn, cough, deep breath on rounds.  Patient's BLE have many scabs, bruising, legs elevated on pillows and WOC RN following.  Patient declined getting out of chair today, repositioning in chair q2h and encouraged patient to ambulate or go back to bed.  Education provided regarding importance of repositioning and skin breakdown provided.  Per daughter's request arranged for palliative to meet with her and patient.  SW following, new orders for PT/OT to see patient.  Emotional support provided to patient and family prn.

## 2023-09-25 NOTE — PROGRESS NOTES
[facility]    Progress Note - Hospitalist Service       Date of Admission:  9/23/2023    Assessment & Plan   Irene León is a 78 year old female admitted on 9/23/2023. She has a history of CHF with reduced ejection fraction (most recently EF 20-25% on 6/22), cardiomyopathy, severe COPD with chronic home oxygen 3 L at rest and 5-6 L with exertion, CAD, paroxysmal A-fib on Xarelto, CKD stage IV, lung cancer on oral chemo and breast cancer on oral chemo who is admitted for acute hypoxic respiratory failure.     Acute on chronic hypoxic respiratory failure   Possible COPD exacerbation   Patient with history of severe COPD on home oxygen at 3 LPM; recently has been requiring 5+ LPM with exertion which is above what current home oxygen machine is able to do. Acutely SOB at home on day of admission requiring BiPAP by EMS. VBG on arrival with pH 7.42 with CO2 of 56. Recevied duonebs x2 and solumedrol in ED, transitioned to 5L O2 via oxymask where she is now stable, saturating well, and asymptomatic. Has been hospitalized for respiratory failure several times over the last few months, Likely multifactorial with COPD contributing and CHF contributing as below. Patient was previously full code but was transitioned to DNR/DNI status after much discussion in ED on 9/23/23. Hospice/palliative team has been consulted and will be involved.  - Continue PTA inhalers, mucomyst, tessalon perles  - Duonebs and Mucomyst PRN   - S/p solumedrol in ER then transitioned to prednisone 40mg   - Started on doxycycline and ceftriaxone in the ED   -Continue doxycycline (9/23-)   -Low suspicion for infectious etiology so will discontinue ceftriaxone (9/23-9/24) with low threshold to resume if patient clinical status acutely worsening  - Respiratory panel and sputum cultures pending collection as able  - Strict I&O's and daily weights  - Continuous pulse ox   - Supplemental O2 as needed to maintain oxygen  saturations between 88%-92%, RRT is on board to manage further  - Hospice/palliative consulted and following, appreciate recommendations  - AM BMP     CHF with reduced EF 20%-25%  Cardiomyopathy  Possible CHF exacerbation   Recent hospitalization for respiratory failure 2/2 CHF. Lasix dose was recently changed to 80 BID and patient has been compliant with this change though notes increased leg swelling and weight on ED presentation. BNP on admission ~6,000 (noted to be 9,600 on recent admission 9/15/23). Troponin elevated on admission to 43 but was 44 two days prior so no significant change. CHF likely contributing to respiratory failure.  - Switched from Lasix to Bumex on 9/24/23 per Cardiology as below   -Last dose of Lasix 9/24/23 AM   -Bumex held 9/24/23 in setting of patient spending time with  in ICU and low blood pressures. Dose given today Bumex 9/25/23.  - Cardiology consulted and following, appreciate recommendations   -Give another dose of IV Bumex today and then change to Bumex 1 mg p.o. twice daily tomorrow.  Would like to observe her response to oral Bumex prior to discharge home.  -Continue on Xarelto for anticoagulation and statin therapy  -Consider outpatient appointment with EP to discuss CRT device.  Patient has advanced COPD and unclear if she would be a candidate.  -Recommend follow-up with Dr. Chilel her cardiologist as outpatient  - Strict I&O's and daily weights   - Continuous pulse ox   - Supplemental O2 as needed to maintain oxygen saturations 88-92% though as above, RRT is on board for further management  - Cardiac tele resumed today 9/25/23 in light of possible CHF exacerbation and addition of possible QTC prolonging medication     CKD stage 4  Cr 1.73 on admission, GFR 30, BUN 24.3. 9/25/23 creatinine 1.59, GFR 33. And BUN 27.5.  - AM BMP      Lower extremity wounds  - WOC consult placed 9/23/23, appreciate recommendations     Right food swelling  9/23/23 right foot XR showing that  the right foot is negative for fracture, there is significant soft tissue swelling along the dorsal aspect of the foot, cellulitis could also have this appearance. Area of swelling is bruised and raised, appears to be hematoma but will continue to monitor daily for any changes. Tenderness to palpation but no surrounding erythema or warmth, and no fevers.    Insomnia  Patient reporting minimal/no sleep despite melatonin 10 mg given last night. Would be interested in additional medication for sleep/anxiety  -PRN hydroxyzine 25-50 mg daily   -Patient has history of QTC prolongation so will monitor closely with cardiac tele as above which was also started due to possible CHF exacerbation     Chronic conditions  Paroxysmal A. Fib: continue PTA DOAC (Xarelto) and PTA metoprolol   Lung cancer: follows with Dr. Suarez at [facility], currently oral chemo medication is on hold due to side effects   Breast cancer: follows with Dr. Suarez at [facility], continue daily PTA tamoxifen        Diet: Combination Diet Regular Diet Adult; 2 gm NA Diet; Low Saturated Fat Diet    DVT Prophylaxis: PTA DOAC  Hayes Catheter: Not present  Fluids: None  Lines: None     Cardiac Monitoring: ACTIVE order. Indication: QTc prolonging medication (48 hours)  Code Status: No CPR- Do NOT Intubate      Clinically Significant Risk Factors                      # Hypertension: Noted on problem list    # Chronic heart failure with reduced ejection fraction: last echo with EF <40%           # COPD: noted on problem list          Disposition Plan      Expected Discharge Date: 09/26/2023        Discharge Comments: Palliative consult, O2        The patient's care was discussed with the Attending Physician, Dr. Quinonez .    JOSE MIGUEL LAMAS MD  Hospitalist Service  [facility]  Securely message with [employer] (more info)  Text page via [employer] Paging/Directory   ______________________________________________________________________    Interval History    Patient's  passed away in the ICU last night.  Patient understandably reports poor sleep, grief, some anxiety.  She states she is otherwise doing well.  Her breathing has been stable since yesterday.  She continues to require 5 LPM but did not require BiPAP or any additional supplementation last night.  No new cough, fevers, chills.  She feels that she has not urinated much but denies any suprapubic pain or bloating.  No issues with bowel movements.  Regarding poor sleep, patient did receive 10 mg melatonin last night per her request.  However, despite this, she remained awake most the night.  She is therefore interested in trying a different medication for sleep/anxiety.    Physical Exam   Vital Signs: Temp: 97.4  F (36.3  C) Temp src: Oral BP: 97/53 Pulse: 90   Resp: 17 SpO2: 94 % O2 Device: Nasal cannula Oxygen Delivery: 5 LPM  Weight: 107 lbs 1.6 oz    Constitutional: awake, alert, comfortable-appearing why laying in bed, no acute distress  HEENT: Lids and lashes normal, sclera clear, conjunctiva normal. Normocephalic and atraumatic. No rhinorrhea. Moist mucus membranes.   Respiratory: Patient breathing comfortably on 5L O2 via oxymask, no increased work of breathing or respiratory distress, decreased breath sounds throughout but no wheezing.   Cardiovascular: Regular rate and rhythm, no murmur noted.  GI: Soft, non-distended, non-tender. Normoactive bowel sounds.  Skin: Bruising throughout extremities. Several chronic-appearing scabs/lesions and dry skin throughout bilateral lower extremities. Raised, bruised area on dorsal aspect of patient's right foot that is tender to palpation but has no surrounding erythema or warmth and is unchanged from yesterday.  Musculoskeletal: Trace pitting edema of bilateral lower extremities.  Neurologic: Awake, alert, oriented to name, place and time. No apparent focal deficits.    Data     I have personally reviewed the following data over the past 24 hrs:    10.8  \    9.7 (L)   / 206     144 105 27.5 (H) /  95   3.7 30 (H) 1.59 (H) \       Imaging results reviewed over the past 24 hrs:   No results found for this or any previous visit (from the past 24 hour(s)).

## 2023-09-25 NOTE — PROVIDER NOTIFICATION
Provider notified:     Pt's BP is low 88/54. Asymptomatic. Bumex is due. There is no parameter. Do you want me to hold it? Also she is requesting for melatonin 15 mg? Please advise. Thanks!     Order placed for holding bumex and melatonin.

## 2023-09-25 NOTE — CONSULTS
[facility]  WOC Nurse Inpatient Assessment     Consulted for: BLEs    Summary:     Patient History (according to provider note(s):      Irene León is a 78 year old female admitted on 9/23/2023. She has a history of CHF with reduced ejection fraction (most recently EF 20-25% on 6/22), cardiomyopathy, severe COPD with chronic home oxygen 3 L at rest and 5-6 L with exertion, CAD, paroxysmal A-fib on Xarelto, CKD stage IV, lung cancer on oral chemo and breast cancer on oral chemo who is admitted for acute hypoxic respiratory failure.        Assessment:      Skin Injury Location: BLEs        9/25                                                                                                                                              L lateral calf    Last photo: 9/25  Skin injury due to: Hematoma and Skin tear  Skin history and plan of care:   Patient has paper thin skin, multiple hematomas in various stages of healing.   Affected area:      Skin assessment: Denudement     Measurements (length x width x depth, in cm) 2  x 2  x  0.1 cm lateral L calf     Color: normal and consistent with surrounding tissue     Temperature  cool     Drainage: none .      Color: none      Odor: none  Pain: moderate, aching  Pain interventions prior to dressing change: slow and gentle cares   Treatment goal: Heal  and Protection  STATUS: initial assessment  Supplies ordered: supplies stored on unit       Treatment Plan:     LLE  Flush wounds with NS, pat dry  Cover wounds with mepilex  Change dressings every 5 days + PRN if soiled, saturated, falls off    Orders: Written    RECOMMEND PRIMARY TEAM ORDER: None, at this time  Education provided: plan of care and wound progress  Discussed plan of care with: Patient and Family  WOC nurse follow-up plan: every other week  Notify WOC if wound(s) deteriorate.  Nursing to notify the Provider(s) and re-consult the WOC Nurse if new skin concern.    DATA:     Current  support surface: Standard  Standard gel/foam mattress (IsoFlex, Atmos air, etc)  Containment of urine/stool: Continent of bladder and Continent of bowel  BMI: Body mass index is 21.59 kg/m .   Active diet order: Orders Placed This Encounter      Combination Diet Regular Diet Adult; 2 gm NA Diet; Low Saturated Fat Diet     Output: I/O last 3 completed shifts:  In: 520 [P.O.:520]  Out: 1700 [Urine:1700]     Labs:   Recent Labs   Lab 09/25/23  0558 09/23/23  1611 09/21/23  1039   ALBUMIN  --   --  3.3*   HGB 9.7*   < > 11.1*   WBC 10.8   < > 9.2    < > = values in this interval not displayed.     Pressure injury risk assessment:   Sensory Perception: 4-->no impairment  Moisture: 3-->occasionally moist  Activity: 3-->walks occasionally  Mobility: 3-->slightly limited  Nutrition: 3-->adequate  Friction and Shear: 2-->potential problem  Jasen Score: 18    NEYMAR Guillermo RN CWOCN  Pager no longer in use, please contact through [employer] group: [facility]

## 2023-09-25 NOTE — PLAN OF CARE
Goal Outcome Evaluation:       Pt alert and oriented X 4. Soft BP, in 90's. Held Bumex and Metoprolol. Resting in chair through out the night shift. Tearful about her  death. Up with assist of one. Pure wick in place. Incontinence of bladder. Family at bedside. Sleeping between cares.

## 2023-09-26 NOTE — DISCHARGE INSTRUCTIONS
WOC DISCHARGE INSTRUCTIONS:  LLE  Flush wounds with NS, pat dry  Cover wounds with mepilex  Change dressings every 5 days + PRN if soiled, saturated, falls off

## 2023-09-26 NOTE — PROGRESS NOTES
[facility]  Palliative Care Daily Progress Note       Recommendations & Counseling     GOALS OF CARE:   Currently goals are life prolonging with limits-DNR/DNI.    Patient is hoping to be strong enough to return home and navigate 7 stairs in her home.  PT/OT orders have been placed.  She is agreeable to TCU, if eligible.    Discussed option of hospice with patient and her daughter on 9/25.  At this time, patient feels she is not ready for hospice.  May benefit from hospice informational consultation after discharge, whether she goes to TCU or home.  She verbalized today, that she needs to be strong enough to ambulate to the bathroom in order to return home.   Stephanie has a strong merline and identifies as Gnosticist.  She would be open to spiritual care visit.  Spiritual care consult placed.  She also requested that palliative care SW follow up with daughter Margie via phone.  W  Palliative care will continue to follow.  Recommend to continue to follow with outpatient palliative care if not signing on with hospice.     ADVANCE CARE PLANNING:  No health care directive on file.    POLST on file.  Currently POLST documents DNR and selective medical treatments.  Code status: No CPR- Do NOT Intubate     MEDICAL MANAGEMENT:   # Shortness of breath secondary to advanced COPD with acute exacerbation, CHF  Currently on 5 lpm, baseline is 3 lpm.  Transitioning to oral diuretics.  -Continue with oxygen, nebs, inhalers, po antibiotics, steroids, diuretics  -Currently managed by primary team and cardiology     #Generalized weakness and fatigue secondary to the above   -PT/OT evaluations pending- to evaluate strength and current functional abilities.      #history of Chronic pain 2/2 compression fracture  -Currently denies pain  -Continue current regimen of gabapentin 600 mg at HS and tramadol 50 mg BID PRN  -Continue to monitor     #Insomnia  Continues with zyprexa 5 mg at HS and melatonin 10 mg at HS PRN  -Would  "use caution with adding any other medications that can prolong QTC.  Would avoid benzodiazepine due to potential adverse effects.  If goals of care transition to comfort focused, then weigh risk vs benefit.        PSYCHOSOCIAL/SPIRITUAL SUPPORT:  Spiritual care consult per patient request  Patient requesting follow up from palliative care [facility] for her daughter for emotional support/grief support.      Palliative Care will continue to follow. Thank you for the consult and allowing us to aid in the care of Irene León.        Assessments          Irene León is a 78 year old female with history of nonischemic cardiomyopathy LVEF 20-25%, chronic heart failure with reduced ejection fraction, minimal coronary disease per angiogram, paroxysmal atrial fibrillation, lung cancer, COPD, chronic hypoxic respiratory failure on 3 L of home O2, chronic kidney disease who presented to the ED on 9/23/23 with shortness of breath.  Admitted to the hospital with acute on chronic hypoxic respiratory failure, COPD exacerbation, CHF with EF 20-25%.  Cardiology following, patient currently on IV bumex but held due to hypotension.  Oxygen needs are 5 lpm which is higher than her home baseline.      Recurrent hospitalizations-this is her 5th admission since 6/2023.   She is followed in outpatient palliative care clinic, last seen on 8/15/23.       Today, the patient was seen for:  Goals of care and symptom management                Interval History:     Chart review/discussion with unit or clinical team members:   No significant overnight events.     Per patient or family/caregivers today:  See above impression/recommendations.    Met with patient and sister.  States she has \"broken down\" a couple of times since her 's death on Sunday.  She says \"I'll get through this\" but hard to imagine not seeing her  again.   Denies pain.  Less short of breath today.  Looking forward to working with PT/OT.     Key Palliative " Symptoms:  # Pain severity the last 12 hours: low  # Dyspnea severity the last 12 hours: low  # Nausea severity the last 12 hours: none  # Anxiety severity the last 12 hours: low               Review of Systems:     Besides above, an additional 4 point system ROS was reviewed and is unremarkable          Medications:     I have reviewed this patient's medication profile and medications during this hospitalization.           Physical Exam:   Vital Signs: Blood pressure 118/68, pulse 86, temperature 98.2  F (36.8  C), temperature source Oral, resp. rate 20, weight 48.6 kg (107 lb 1.6 oz), SpO2 93 %, not currently breastfeeding.   Constitutional: Alert, sitting in recliner chair, NAD  Respiratory: Breathing nonlabored at rest  NEURO: Alert and oriented X 4                Data Reviewed:         Results for orders placed or performed during the hospital encounter of 09/23/23   XR Chest Port 1 View    Impression    IMPRESSION: Heart size is stable. Right midlung nodule measuring 2.5 cm, unchanged. Decrease in linear parenchymal opacity in the left midlung. Chronic interstitial scarring/fibrosis bilaterally. There is some increasing pleural thickening in the left   upper lobe. No pneumothorax. Old right rib fracture.   XR Foot Port Right 2 Views    Impression    IMPRESSION: The right foot is negative for fracture. There is significant soft tissue swelling along the dorsal aspect of the foot. Cellulitis could also have this appearance.       Recent Labs   Lab 09/26/23  0520 09/25/23  0558 09/24/23  0635 09/23/23  1611 09/21/23  1039   WBC 13.2* 10.8  --  9.4 9.2   HGB 9.9* 9.7*  --  11.0* 11.1*   MCV 93 93  --  93 95    206  --  242 260    144 140 138 142   POTASSIUM 3.6 3.7 4.1 3.6 3.7   CHLORIDE 107 105 101 98 102   CO2 30* 30* 29 32* 30*   BUN 26.5* 27.5* 23.5* 24.3* 35.1*   CR 1.37* 1.59* 1.52* 1.73* 1.85*   ANIONGAP 5* 9 10 8 10   MESSI 8.4* 8.4* 8.8 9.2 9.2   GLC 86 95 125* 103* 94   ALBUMIN  --   --   --    --  3.3*   PROTTOTAL  --   --   --   --  7.3   BILITOTAL  --   --   --   --  0.3   ALKPHOS  --   --   --   --  47   ALT  --   --   --   --  <5   AST  --   --   --   --  25      Lab Results   Component Value Date    WBC 13.2 (H) 09/26/2023    HGB 9.9 (L) 09/26/2023    HCT 32.3 (L) 09/26/2023     09/26/2023     09/26/2023    POTASSIUM 3.6 09/26/2023    CHLORIDE 107 09/26/2023    CO2 30 (H) 09/26/2023    BUN 26.5 (H) 09/26/2023    CR 1.37 (H) 09/26/2023    GLC 86 09/26/2023    NTBNP 6,037 (H) 09/23/2023    AST 25 09/21/2023    ALT <5 09/21/2023    ALKPHOS 47 09/21/2023    BILITOTAL 0.3 09/21/2023    INR 1.05 08/27/2023      Lab Results   Component Value Date    ALBUMIN 3.3 09/21/2023    ALBUMIN 3.1 08/27/2023                    ====================================================  TT: I have personally spent a total of 50 minutes on the day of the encounter on the unit in review of medical record, consultation with the medical providers and assessment of patient today, with time spent in counseling, coordination of care, and discussion with patient and family re: diagnostic results, prognosis, symptom management, risks and benefits of management options, and development of plan of care as noted above.      ====================================================    Sahara Mercedes, CNS  [facility], Palliative Care  Securely message with the Vocera Web Console (learn more here) or  Text page via [employer] Paging/Directory

## 2023-09-26 NOTE — PROGRESS NOTES
[facility] [address], [place] [address]   [email or web address]   OFFICE: [phone]            Impression and Plan     1.  Acute hypoxic respiratory failure: On 3 L chronically at home.  Recent hospitalization now readmitted with recurrent respiratory failure.  Recurrent hospitalization and patient elected for DNR status at this time.   2.  Acute on chronic heart failure with reduced ejection fraction.  3.  Nonischemic cardiomyopathy LVEF 20-25% longstanding with left bundle branch block  4.  Paroxysmal atrial fibrillation currently in sinus rhythm  5.  Nonobstructive coronary disease  6.  COPD exacerbation  7.  Stage III chronic kidney disease  8.  Lung cancer     Plan:  1.  Continue bumetanide 1 mg twice daily.  2.  Continue on rivaroxaban for anticoagulation and statin therapy  3.  Consider outpatient appointment with EP to discuss CRT device.      Stephanie has appointment scheduled with Samantha Lopes in the [facility] Failure [facility] on 9 October 2023 and also with Dr. Gaby Chilel on 19 December 2023.    Primary Cardiologist: Dr. Gaby Chilel     Subjective     Stephanie states her lower extremity edema has remained improved.  Breathing near baseline.  Denies chest pain.      Cardiac Diagnostics       Echocardiogram 22 June 2023:  Normal left ventricular size with severely reduced left ventricular systolic performance.  Ejection fraction 20-25%.  Severe global reduction left ventricular systolic performance.  Mild increase in left ventricular wall thickness.  No significant valvular heart disease.  Normal right ventricular size with moderately reduced right ventricular systolic performance.  Mild left atrial enlargement.  Right atrium of normal dimension.    Twelve-lead ECG (personally reviewed) 23 September 2023: Sinus rhythm with PACs.  Left bundle branch block.    Physical Examination       /68 (BP Location: Right arm, Patient Position:  Chair, Cuff Size: Adult Small)   Pulse 86   Temp 98.2  F (36.8  C) (Oral)   Resp 20   Wt 48.6 kg (107 lb 1.6 oz)   SpO2 93%   BMI 21.59 kg/m          Vitals:    09/25/23 0421   Weight: 48.6 kg (107 lb 1.6 oz)            Intake/Output Summary (Last 24 hours) at 9/26/2023 1131  Last data filed at 9/25/2023 2216  Gross per 24 hour   Intake 880 ml   Output 200 ml   Net 680 ml       The patient is alert and oriented times three. Sclerae are anicteric. Mucosal membranes are moist. Jugular venous pressure is reasonable.  No significant adenopathy/thyromegally appreciated. Lungs are-bilaterally.  On cardiovascular exam, the patient has a regular S1 and S2. Abdomen is soft and non-tender. Extremities reveal no clubbing, cyanosis,.         Imaging   Chest radiograph 23 September 2023:  Heart size is stable. Right midlung nodule measuring 2.5 cm, unchanged. Decrease in linear parenchymal opacity in the left midlung. Chronic interstitial scarring/fibrosis bilaterally. There is some increasing pleural thickening in the left   upper lobe. No pneumothorax. Old right rib fractur    Lab Results     Recent Labs   Lab 09/26/23  0520 09/25/23  0558 09/24/23  0635 09/23/23  1611 09/21/23  1039   WBC 13.2* 10.8  --  9.4 9.2   HGB 9.9* 9.7*  --  11.0* 11.1*   MCV 93 93  --  93 95    206  --  242 260    144 140 138 142   POTASSIUM 3.6 3.7 4.1 3.6 3.7   CHLORIDE 107 105 101 98 102   CO2 30* 30* 29 32* 30*   BUN 26.5* 27.5* 23.5* 24.3* 35.1*   CR 1.37* 1.59* 1.52* 1.73* 1.85*   ANIONGAP 5* 9 10 8 10   MESSI 8.4* 8.4* 8.8 9.2 9.2   GLC 86 95 125* 103* 94   ALBUMIN  --   --   --   --  3.3*   PROTTOTAL  --   --   --   --  7.3   BILITOTAL  --   --   --   --  0.3   ALKPHOS  --   --   --   --  47   ALT  --   --   --   --  <5   AST  --   --   --   --  25     Recent Labs   Lab Test 08/27/23  0608 08/27/23  0101 07/03/23  1125   * 456* 200*     No lab results found in last 7 days.    Invalid input(s): LDLCALC  Lab Results    Component Value Date     09/26/2023    CO2 30 09/26/2023    CO2 30 08/28/2023    BUN 26.5 09/26/2023    BUN 21 08/28/2023     Lab Results   Component Value Date    WBC 13.2 09/26/2023    HGB 9.9 09/26/2023    HCT 32.3 09/26/2023    MCV 93 09/26/2023     09/26/2023     No results found for: CHOL, TRIG, HDL, LDLDIRECT  Lab Results   Component Value Date    INR 1.05 08/27/2023     Lab Results   Component Value Date    TROPONINI 0.07 08/27/2023    TROPONINI 0.07 08/27/2023    TROPONINI 0.04 07/03/2023     No results found for: TSH        Current Inpatient Scheduled Medications   Scheduled Meds:   bumetanide  1 mg Oral BID    doxycycline hyclate  100 mg Oral Daily    escitalopram  10 mg Oral Daily    famotidine  20 mg Oral Every Other Day    fluticasone-vilanterol  1 puff Inhalation At Bedtime    And    umeclidinium  1 puff Inhalation At Bedtime    gabapentin  600 mg Oral At Bedtime    metoprolol succinate ER  50 mg Oral At Bedtime    mirabegron  25 mg Oral Daily    OLANZapine  5 mg Oral At Bedtime    predniSONE  40 mg Oral Daily    rivaroxaban ANTICOAGULANT  15 mg Oral At Bedtime    spironolactone  25 mg Oral Daily    tamoxifen  20 mg Oral Daily    tolterodine ER  2 mg Oral Daily     Continuous Infusions:   - MEDICATION INSTRUCTIONS -              Medications Prior to Admission   Prior to Admission medications    Medication Sig Start Date End Date Taking? Authorizing Provider   acetaminophen (TYLENOL) 500 MG tablet Take 1,000 mg by mouth every 8 hours as needed for mild pain or fever  3/31/21  Yes Provider, Historical   acetylcysteine (MUCOMYST) 10 % nebulizer solution Inhale 4 mLs into the lungs every 4 hours as needed for mucolysis/respiratory distress   Yes Unknown, Entered By History   albuterol (PROAIR HFA/PROVENTIL HFA/VENTOLIN HFA) 108 (90 Base) MCG/ACT inhaler Inhale 2 puffs into the lungs every 6 hours as needed for shortness of breath, wheezing or cough   Yes Unknown, Entered By History    Azelastine HCl 137 MCG/SPRAY SOLN Spray 1 spray into both nostrils 2 times daily as needed for rhinitis   Yes Unknown, Entered By History   benzonatate (TESSALON PERLES) 100 MG capsule Take 1 capsule (100 mg) by mouth 3 times daily as needed for cough 6/29/23  Yes Tobi Miguel MD   CALCIUM-MAGNESIUM-ZINC PO Take 1 tablet by mouth daily   Yes Unknown, Entered By History   cetirizine (ZYRTEC) 5 MG tablet Take 1 tablet (5 mg) by mouth daily as needed for allergies 9/19/23  Yes Dereje Isaac MD   chlorhexidine (PERIDEX) 0.12 % solution [CHLORHEXIDINE (PERIDEX) 0.12 % SOLUTION] Apply 15 mL to the mouth or throat at bedtime as needed.  3/15/17  Yes Provider, Historical   escitalopram (LEXAPRO) 10 MG tablet Take 1 tablet (10 mg) by mouth daily 8/2/23  Yes Pankaj Montanez MD   fluticasone (FLONASE) 50 MCG/ACT nasal spray Spray 1 spray into both nostrils daily as needed for rhinitis or allergies   Yes Unknown, Entered By History   Fluticasone-Umeclidin-Vilanterol (TRELEGY ELLIPTA) 200-62.5-25 MCG/INH oral inhaler Inhale 1 puff into the lungs At Bedtime   Yes Unknown, Entered By History   furosemide (LASIX) 80 MG tablet Take 1 tablet (80 mg) by mouth 2 times daily 9/19/23  Yes Dereje Isaac MD   gabapentin (NEURONTIN) 600 MG tablet Take 600 mg by mouth At Bedtime   Yes Unknown, Entered By History   HYDROmorphone, STANDARD CONC, (DILAUDID) 1 MG/ML oral solution Take 1 mg by mouth 3 times daily as needed for pain   Yes Unknown, Entered By History   ipratropium (ATROVENT HFA) 17 MCG/ACT inhaler Inhale 2 puffs into the lungs every 6 hours as needed for wheezing   Yes Unknown, Entered By History   ipratropium - albuterol 0.5 mg/2.5 mg/3 mL (DUONEB) 0.5-2.5 (3) MG/3ML neb solution Take 1 vial by nebulization every 6 hours as needed for shortness of breath, wheezing or cough   Yes Unknown, Entered By History   Melatonin 5 MG TBDP Take 15 mg by mouth At Bedtime   Yes Unknown, Entered By History   metoprolol succinate ER  (TOPROL XL) 50 MG 24 hr tablet Take 1.5 tablets (75 mg) by mouth At Bedtime 6/2/23  Yes Pankaj Montanez MD   mirabegron (MYRBETRIQ) 25 MG 24 hr tablet Take 1 tablet (25 mg) by mouth daily 9/19/23  Yes Dereje Isaac MD   multivitamin w/minerals (THERA-VIT-M) tablet Take 1 tablet by mouth daily   Yes Reported, Patient   nystatin (MYCOSTATIN) 179745 UNIT/GM external cream Apply topically 3 times daily as needed for dry skin   Yes Unknown, Entered By History   OLANZapine (ZYPREXA) 5 MG tablet Take 1 tablet (5 mg) by mouth At Bedtime 8/15/23  Yes Tobi Miguel MD   prochlorperazine (COMPAZINE) 10 MG tablet Take 1 tablet (10 mg) by mouth every 6 hours as needed (Nausea/Vomiting) 8/31/23  Yes Devon Suarez MD   rivaroxaban ANTICOAGULANT (XARELTO) 15 MG TABS tablet Take 1 tablet (15 mg) by mouth At Bedtime   Yes Unknown, Entered By History   simvastatin (ZOCOR) 40 MG tablet Take 1 tablet (40 mg) by mouth At Bedtime 6/1/23  Yes Pankaj Montanez MD   sodium chloride 0.9 % neb solution Take 3 mLs by nebulization every 3 hours as needed for wheezing   Yes Unknown, Entered By History   solifenacin (VESICARE) 5 MG tablet Take 1 tablet (5 mg) by mouth daily 7/12/23  Yes Pankaj Montanez MD   sotorasib (LUMAKRAS) 120 MG tablet Take 8 tablets (960 mg) by mouth daily for 30 days Do not chew, crush or split tablets. 9/1/23 10/1/23 Yes Devon Suarez MD   spironolactone (ALDACTONE) 25 MG tablet Take 1 tablet (25 mg) by mouth daily 9/19/23  Yes Dereje Isaac MD   tamoxifen (NOLVADEX) 20 MG tablet Take 1 tablet (20 mg) by mouth daily 6/30/23  Yes Devon Suarez MD   traMADol (ULTRAM) 50 MG tablet Take 50 mg by mouth 2 times daily as needed for severe pain   Yes Unknown, Entered By History   vitamin D3 (CHOLECALCIFEROL) 50 mcg (2000 units) tablet Take 50 mcg by mouth daily   Yes Unknown, Entered By History   zinc oxide (DESITIN) 40 % external ointment Apply topically as needed for dry skin or irritation (Twice a day to  gluteal area skin irritation) 7/12/23  Yes Pankaj Montanez MD   famotidine (PEPCID) 20 MG tablet Take 1 tablet (20 mg) by mouth every other day 9/19/23   Dereje Isaac MD

## 2023-09-26 NOTE — PLAN OF CARE
Problem: Risk for Delirium  Goal: Improved Behavioral Control  Outcome: Progressing     Problem: Risk for Delirium  Goal: Optimal Coping  Outcome: Progressing     Problem: COPD (Chronic Obstructive Pulmonary Disease)  Goal: Effective Oxygenation and Ventilation  Outcome: Progressing   Goal Outcome Evaluation:    Pt alert and oriented, pleasant overnight. Denies pain at rest, c/o pain when touching bilateral feet. Heels floated on pillows. Sleeping in recliner, able to reposition self in recliner. Declines turns. LE mepilexes CDI. VSS, O2 weaned from 5 to 3L NC after satting 95-98% on 5L. Denies cough or SOB at rest. Slept between cares, PRN atarax given x1. Tele continued: SR w/ BBB and prolonged QT. Plan to work with PT/OT today.

## 2023-09-26 NOTE — PLAN OF CARE
Problem: Plan of Care - These are the overarching goals to be used throughout the patient stay.    Goal: Absence of Hospital-Acquired Illness or Injury  Intervention: Identify and Manage Fall Risk  Recent Flowsheet Documentation  Taken 9/26/2023 1700 by Thalia Chua RN  Safety Promotion/Fall Prevention:   activity supervised   assistive device/personal items within reach   clutter free environment maintained   lighting adjusted   mobility aid in reach   nonskid shoes/slippers when out of bed   patient and family education   room organization consistent   supervised activity   safety round/check completed   room near nurse's station   Goal Outcome Evaluation:      Plan of Care Reviewed With: patient    Overall Patient Progress: improvingOverall Patient Progress: improving  pt alert and oriented, vss on 5 liters of oxygen, able to voice needs. Requires assist of 2 to ambulate. Has a tele in place, she is incontinent but she requested for purewick,   gets SOB on exertion.

## 2023-09-26 NOTE — PROGRESS NOTES
[facility]    Progress Note - Hospitalist Service       Date of Admission:  9/23/2023    Assessment & Plan   Irene León is a 78 year old female admitted on 9/23/2023. She has a history of CHF with reduced ejection fraction (most recently EF 20-25% on 6/22), cardiomyopathy, severe COPD with chronic home oxygen 3 L at rest and 5-6 L with exertion, CAD, paroxysmal A-fib on Xarelto, CKD stage IV, lung cancer on oral chemo and breast cancer on oral chemo who is admitted for acute hypoxic respiratory failure.     Acute on chronic hypoxic respiratory failure   Possible COPD exacerbation   Patient with history of severe COPD on home oxygen at 3 LPM. Acutely SOB at home on day of admission requiring BiPAP by EMS. VBG on arrival with pH 7.42 with CO2 of 56. Recevied duonebs x2 and solumedrol in ED, transitioned to 5L O2 via oxymask where she stabilized. Early 9/26/23 AM, her oxygen requirement decreased back down to 3 LPM. Patient has been hospitalized for respiratory failure several times over the last few months, Likely multifactorial with COPD contributing and CHF contributing as below. Patient was previously full code but was transitioned to DNR/DNI status after much discussion in ED on 9/23/23. Hospice/palliative team has been consulted and will be involved.  - Continue PTA inhalers, mucomyst, tessalon perles  - Duonebs and Mucomyst PRN   - S/p solumedrol in ER then transitioned to prednisone 40mg   - Started on doxycycline and ceftriaxone in the ED   -Continue doxycycline (9/23-)   -Low suspicion for infectious etiology so will discontinue ceftriaxone (9/23-9/24) with low threshold to resume if patient clinical status acutely worsening  - Respiratory panel and sputum cultures pending collection as able  - Strict I&O's and daily weights  - Continuous pulse ox   - Supplemental O2 as needed to maintain oxygen saturations between 88%-92%, RRT is on board to manage further  -  Hospice/palliative consulted and following, appreciate recommendations     CHF with reduced EF 20%-25%  Cardiomyopathy  Possible CHF exacerbation   Recent hospitalization for respiratory failure 2/2 CHF. BNP on admission ~6,000 (noted to be 9,600 on recent admission 9/15/23). Troponin elevated on admission to 43 but was 44 two days prior so no significant change. CHF likely contributing to respiratory failure.  - Switched from Lasix to Bumex on 9/24/23 per Cardiology as below   -Last dose of Lasix 9/24/23 AM   -Bumex held 9/24/23 in setting of patient spending time with  in ICU and low blood pressures. First dose IV Bumex given 9/25/23.  - Cardiology consulted and following, appreciate recommendations   -Continue bumetanide 1 mg twice daily.  -Continue on rivaroxaban for anticoagulation and statin therapy  -Consider outpatient appointment with EP to discuss CRT device.    - Strict I&O's and daily weights   - Continuous pulse ox   - Supplemental O2 as needed to maintain oxygen saturations 88-92% though as above, RRT is on board for further management  - Cardiac tele resumed 9/25/23 in light of possible CHF exacerbation and addition of possible QTC prolonging medication     CKD stage 4  Cr 1.73 on admission, GFR 30, BUN 24.3. 9/25/23 creatinine 1.59, GFR 33, and BUN 27.5. This continues to be stable.  - AM BMP      Lower extremity wounds  - WOC consult placed 9/23/23, appreciate recommendations     Right food swelling  9/23/23 right foot XR showing that the right foot is negative for fracture, there is significant soft tissue swelling along the dorsal aspect of the foot, cellulitis could also have this appearance. Area of swelling is bruised and raised, appears to be hematoma but will continue to monitor daily for any changes. Tenderness to palpation but no surrounding erythema or warmth, and no fevers.   - Continues to improve on exam and per and patient    Insomnia  Patient reporting minimal/no sleep despite  melatonin 10 mg. Would be interested in additional medication for sleep/anxiety.   -PRN hydroxyzine 25-50 mg daily   -Patient has history of QTC prolongation so will monitor closely with cardiac tele as above which was also started due to possible CHF exacerbation    Leukocytosis   Leukocytosis in the setting of steroid use. Patient clinically improving and feeling well, so suspect leukocytosis is secondary to steroid use but will continue to monitor.  - Trend CBC     Chronic conditions  Paroxysmal A. Fib: continue PTA DOAC (Xarelto) and PTA metoprolol   Lung cancer: follows with Dr. Suarez at [facility], currently oral chemo medication is on hold due to side effects   Breast cancer: follows with Dr. Suarez at [facility], continue daily PTA tamoxifen        Diet: Combination Diet Regular Diet Adult; 2 gm NA Diet; Low Saturated Fat Diet    DVT Prophylaxis: PTA DOAC  Hayes Catheter: Not present  Fluids: None  Lines: None     Cardiac Monitoring: ACTIVE order. Indication: QTc prolonging medication (48 hours)  Code Status: No CPR- Do NOT Intubate      Clinically Significant Risk Factors                      # Hypertension: Noted on problem list    # Acute heart failure with reduced ejection fraction: last echo with EF <40% and receiving IV diuretics           # COPD: noted on problem list          Disposition Plan      Expected Discharge Date: 09/28/2023        Discharge Comments: Palliative consult, O2, PT/OT consult        The patient's care was discussed with the Attending Physician, Dr. Quinonez .    JOSE MIGUEL LAMAS MD  Hospitalist Service  [facility]  Securely message with [employer] (more info)  Text page via [facility] Paging/Directory   ______________________________________________________________________    Interval History   No acute events overnight.  Patient reports feeling very well this morning.  Denies any fevers, chills, headache, chest pain.  No changes in SOB and feels like she is doing well on the 3  LPM.  Endorses cough but unchanged from previous.  Reports great improvement in what are suspected to be hematomas on her feet; continues to note tenderness to palpation but reports great improvement in swelling and discoloration with no new related symptoms.  Patient is looking forward to working with PT/OT today to get up and moving and continues to be comfortable with the plan to meet with palliative team later today and start oral Bumex twice daily.    Physical Exam   Vital Signs: Temp: 98.2  F (36.8  C) Temp src: Oral BP: 118/68 Pulse: 86   Resp: 20 SpO2: 93 % O2 Device: Nasal cannula Oxygen Delivery: 5 LPM  Weight: 107 lbs 1.6 oz    Constitutional: awake, alert, comfortable-appearing why laying in bed, no acute distress  HEENT: Lids and lashes normal, sclera clear, conjunctiva normal. Normocephalic and atraumatic. No rhinorrhea. Moist mucus membranes.   Respiratory: Patient breathing comfortably on 3L O2 via oxymask, no increased work of breathing or respiratory distress, decreased breath sounds throughout but no wheezing.   Cardiovascular: Regular rate and rhythm, no murmur noted.  GI: Soft, non-distended, non-tender. Normoactive bowel sounds.  Skin: Bruising throughout extremities. Several chronic-appearing scabs/lesions and dry skin throughout bilateral lower extremities. Raised, bruised area on dorsal aspect of patient's right foot that is tender to palpation but has no surrounding erythema or warmth and is improved from yesterday.  Musculoskeletal: Trace pitting edema of bilateral lower extremities.  Neurologic: Awake, alert, oriented to name, place and time. No apparent focal deficits.    Data     I have personally reviewed the following data over the past 24 hrs:    13.2 (H)  \   9.9 (L)   / 210     142 107 26.5 (H) /  86   3.6 30 (H) 1.37 (H) \       Imaging results reviewed over the past 24 hrs:   No results found for this or any previous visit (from the past 24 hour(s)).

## 2023-09-26 NOTE — PLAN OF CARE
Problem: Plan of Care - These are the overarching goals to be used throughout the patient stay.    Goal: Absence of Hospital-Acquired Illness or Injury  Intervention: Identify and Manage Fall Risk  Recent Flowsheet Documentation  Taken 9/25/2023 1600 by Thalia Chua RN  Safety Promotion/Fall Prevention:   activity supervised   assistive device/personal items within reach   clutter free environment maintained   increased rounding and observation   increase visualization of patient   lighting adjusted   mobility aid in reach   nonskid shoes/slippers when out of bed   patient and family education   room door open   room near nurse's station   room organization consistent   safety round/check completed   supervised activity   toileting scheduled   Goal Outcome Evaluation:  pt alert and oriented, vss on  5 liters of oxygen,  able  to voice needs,  requires assist of 2 to ambulate to bathroom, pt is feeling so down because she just  lost her  last night, emotional support given, pt remains on tele,

## 2023-09-26 NOTE — PROGRESS NOTES
09/26/23 1315   Appointment Info   Signing Clinician's Name / Credentials (PT) Marlene Earl PT   Living Environment   People in Home alone  (spouse passed away yesterday)   Current Living Arrangements house   Home Accessibility stairs to enter home;stairs within home   Number of Stairs, Main Entrance 3   Stair Railings, Main Entrance railings safe and in good condition   Number of Stairs, Within Home, Primary seven   Stair Railings, Within Home, Primary railings safe and in good condition   Transportation Anticipated family or friend will provide   Living Environment Comments 3 level split; able to live on upper level except laundry on lower level 14 steps down   Self-Care   Equipment Currently Used at Home walker, rolling;cane, straight   Activity/Exercise/Self-Care Comment independent ADL's; dtr assist with transportation; groceries delivered   General Information   Onset of Illness/Injury or Date of Surgery 09/23/23   Referring Physician Dr. Quinonez   Patient/Family Therapy Goals Statement (PT) get up and walk around house and do stairs   Pertinent History of Current Problem (include personal factors and/or comorbidities that impact the POC) COPD exac; PMH of CHF, lung CA, COPD on 3L home O2, afib, CKD   Existing Precautions/Restrictions oxygen therapy device and L/min  (O2 5L NC)   Cognition   Affect/Mental Status (Cognition) WNL   Orientation Status (Cognition) oriented x 4   Follows Commands (Cognition) WNL   Integumentary/Edema   Integumentary/Edema Comments bruising B forefeet; wounds L lower leg with dressing in place   Range of Motion (ROM)   Range of Motion ROM is WNL   Strength (Manual Muscle Testing)   Strength (Manual Muscle Testing) Deficits observed during functional mobility   Transfers   Transfers sit-stand transfer;toilet transfer   Sit-Stand Transfer   Sit-Stand [place] (Transfers) supervision;verbal cues   Assistive Device (Sit-Stand Transfers) walker, front-wheeled   Toilet Transfer    [place] Level (Toilet Transfer) modified independence   Assistive Device (Toilet Transfer) walker, front-wheeled   Type (Toilet Transfer) sit-stand;stand-sit   Gait/Stairs (Locomotion)   [place] Level (Gait) contact guard;supervision;verbal cues   Assistive Device (Gait) walker, front-wheeled   Distance in Feet 10'x2   Distance in Feet (Gait) 70   Pattern (Gait) step-through   Deviations/Abnormal Patterns (Gait) gait speed decreased;stride length decreased;weight shifting decreased;antalgic   Clinical Impression   Criteria for Skilled Therapeutic Intervention Yes, treatment indicated   PT Diagnosis (PT) impaired functional mobility   Influenced by the following impairments decreased balance, strength, activity tolerance, skin integrity; pain   Functional limitations due to impairments gait, stairs, transfers   Clinical Presentation (PT Evaluation Complexity) Stable/Uncomplicated   Clinical Presentation Rationale pt presents as medically diagnosed   Clinical Decision Making (Complexity) moderate complexity   Planned Therapy Interventions (PT) balance training;gait training;home exercise program;transfer training;strengthening;patient/family education;neuromuscular re-education   Anticipated Equipment Needs at Discharge (PT) walker, rolling   Risk & Benefits of therapy have been explained evaluation/treatment results reviewed;patient   PT Total Evaluation Time   PT Eval, Moderate Complexity Minutes (63660) 20   Physical Therapy Goals   PT Frequency Daily   PT Predicted Duration/Target Date for Goal Attainment 10/03/23   PT Goals Transfers;Gait;Stairs   PT: Transfers Modified independent;Sit to/from stand;Assistive device   PT: Gait Modified independent;Assistive device;100 feet   PT: Stairs Supervision/stand-by assist;7 stairs;Rail on left;Rail on right   Interventions   Interventions Quick Adds Gait Training   Gait Training   Gait Training Minutes (95276) 15   Symptoms Noted During/After Treatment (Gait  Training) significant change in vital signs;shortness of breath   Treatment Detail/Skilled Intervention gait training with RW cuing for avoiding obstacles and walker safety; O2 6L NC decreased to 87% with amb.; cuing for pursed lip breathing with recovery to 95% on 5L   [place] Level (Gait Training) contact guard   Physical Assistance Level (Gait Training) verbal cues;supervision   Weight Bearing (Gait Training) full weight-bearing   Assistive Device (Gait Training) rolling walker   Pattern Analysis (Gait Training) swing-through gait   Gait Analysis Deviations decreased [place];increased time in double stance;decreased step length   Impairments (Gait Analysis/Training) balance impaired;strength decreased   PT Discharge Planning   PT Plan bring 4WW for gait; 7 stairs   PT Discharge Recommendation (DC Rec) Transitional Care Facility   PT Rationale for DC Rec recommend continue PT for strengthening and mobility training   PT Brief overview of current status cga for amb. 70 feet with walker   Total Session Time   Timed Code Treatment Minutes 15   Total Session Time (sum of timed and untimed services) 35

## 2023-09-26 NOTE — PLAN OF CARE
Problem: Plan of Care - These are the overarching goals to be used throughout the patient stay.    Goal: Plan of Care Review  Description: The Plan of Care Review/Shift note should be completed every shift.  The Outcome Evaluation is a brief statement about your assessment that the patient is improving, declining, or no change.  This information will be displayed automatically on your shift note.  Outcome: Not Progressing   Goal Outcome Evaluation:  Continues to report RLE pain exacerbated by weightbearing/activity. Administered tylenol, tramadol, and hydroxyzine for pain management. Using ice packs. O2 needs increased to 5L aeb desaturation's around 85-88% on 3L. Lungs diminished throughout and sob with activity. Telemetry = sinus rhythm with bundle branch block and prolonged QT.

## 2023-09-26 NOTE — PROGRESS NOTES
09/26/23 1510   Appointment Info   Signing Clinician's Name / Credentials (OT) Hany Banks, OTR/L   Living Environment   People in Home alone   Current Living Arrangements house  (3 level split)   Living Environment Comments WIS, with GB and shower chair, Std toilet with GB on the L.   Self-Care   Usual Activity Tolerance moderate   Current Activity Tolerance fair   Instrumental Activities of Daily Living (IADL)   IADL Comments Pt's Dtr helps with IADLs.  Pt's  recently passes away.   General Information   Onset of Illness/Injury or Date of Surgery 09/23/23   Referring Physician Dr. Lennox Quinonez   Patient/Family Therapy Goal Statement (OT) To get stronger   Additional Occupational Profile Info/Pertinent History of Current Problem Adm with COPD, CHF Exacerbation.  PMH:  BBB, Depression, Anxiety, Breast CA, Lung CA, Oral chemo, insomnia, A-fib, asthma, CKD- 3.   Existing Precautions/Restrictions oxygen therapy device and L/min  (Currently on 5L NC O2)   Cognitive Status Examination   Orientation Status orientation to person, place and time   Follows Commands WNL   Visual Perception   Visual Impairment/Limitations corrective lenses for reading   Bed Mobility   Bed Mobility   (Pt does not sleep in the bed - only in her recliner.)   Transfers   Transfers bed-chair transfer;sit-stand transfer;toilet transfer   Transfer Skill: Bed to Chair/Chair to Bed   Bed-Chair [place] (Transfers) contact guard   Assistive Device (Bed-Chair Transfers) rolling walker   Sit-Stand Transfer   Sit-Stand [place] (Transfers) contact guard   Assistive Device (Sit-Stand Transfers) walker, front-wheeled   Toilet Transfer   Type (Toilet Transfer) sit-stand;stand-sit   [place] Level (Toilet Transfer) contact guard   Assistive Device (Toilet Transfer) walker, front-wheeled   Balance   Balance Assessment standing balance: dynamic   Balance Comments good to fair   Activities of Daily Living   BADL Assessment/Intervention  lower body dressing;toileting;grooming   Lower Body Dressing Assessment/Training   [place] Level (Lower Body Dressing) contact guard assist   Position (Lower Body Dressing) supported sitting   Grooming Assessment/Training   Position (Grooming) supported standing   [place] Level (Grooming) contact guard assist   Toileting   Position (Toileting) supported sitting;supported standing   Assistive Devices (Toileting) raised toilet seat;grab bar, toilet   [place] Level (Toileting) contact guard assist   Clinical Impression   Criteria for Skilled Therapeutic Interventions Met (OT) Yes, treatment indicated   OT Diagnosis decreased indep with ADLs and trsfs.  Decreased activity tolerance.   OT Problem List-Impairments impacting ADL problems related to;activity tolerance impaired;mobility;strength   Assessment of Occupational Performance 5 or more Performance Deficits   Identified Performance Deficits dressing, toileting, bathing, trsfs, G/H   Planned Therapy Interventions (OT) ADL retraining;home program guidelines;progressive activity/exercise   Clinical Decision Making Complexity (OT) moderate complexity   Risk & Benefits of therapy have been explained evaluation/treatment results reviewed;participants included;patient   OT Total Evaluation Time   OT Eval, Moderate Complexity Minutes (31845) 15   OT Goals   Therapy Frequency (OT) Daily   OT Predicted Duration/Target Date for Goal Attainment 10/02/23   OT Goals Lower Body Dressing;Transfers;Toilet Transfer/Toileting;Hygiene/Grooming;Aerobic Activity   OT: Lower Body Dressing Modified independent   OT: Transfer Modified independent   OT: Toilet Transfer/Toileting Modified independent   OT: Perform aerobic activity with stable cardiovascular response intermittent activity;10 minutes   OT: Understanding of cardiac education to maximize quality of life, condition management, and health outcomes Patient;Verbalize   OT: Perform aerobic activity with stable  cardiovascular response 10 minutes;intermittent   OT: Functional/aerobic ambulation tolerance with stable cardiovascular response in order to return to home and community environment Modified independent   Interventions   Interventions Quick Adds Self-Care/Home Management   Self-Care/Home Management   Self-Care/Home Mgmt/ADL, Compensatory, Meal Prep Minutes (75245) 30   Symptoms Noted During/After Treatment (Meal Preparation/Planning Training) shortness of breath;fatigue   Treatment Detail/Skilled Intervention Pt sitting in her recliner when therapist arrived.  Pt on 5L NC O2 with resting sats at 92%.  O2 sats drop to 89% with trsfs in the room.  Pt worked on L/B dressing with CGA.  VC to use her PLB with dressing.  May need a reacher to assist with keeping her trunk upright for breathing.  Worked on room trsfs using a FWW.  CGA of one for all trsfs with VC for safety, hand placement and PLB.  Pt's activity tolerance is severely limited.  Tires easiely with prolong standing.  Pt did complete toileting using a RTS and GB with CGA of one.  Stood at sink to wash her hands with CGA of one.  Return to chair with noted drop in O2 sats while on 5L NC O2.  Encouraged PLB for recovery back into the low 90's.  Pt in her chair at end of session, with call light within reach and chair alarm on.  Legs elevated and Ice pack applied to R foot.   OT Discharge Planning   OT Plan Start home exercise program for HF.  Work on room trsfs with FWW and PLB.  Dressing.  G/H   OT Discharge Recommendation (DC Rec) (S)  Transitional Care Facility   OT Rationale for DC Rec Pt not indep with her self cares with limited activity tolerance.  Would benefit from further OT at a TCU to increase activity tolerance and indep prior to returning home alone.   OT Brief overview of current status CGA with trsfs and ADLs.  Limited activity tolerance for self cares and trsfs.  On 5L NC O2 with sats 88-93%   Total Session Time   Timed Code Treatment Minutes  30   Total Session Time (sum of timed and untimed services) 45

## 2023-09-26 NOTE — CONSULTS
Chronic Pulmonary Disease Specialist Education Consult  9/26/2023, 2:32 PM  Reason for Consult: Severe COPD with exacerbation, duonebs, and other cares  Patient Admitted for: Shortness of breath [R06.02]  COPD exacerbation (H) [J44.1]  Acute respiratory failure with hypoxia (H) [J96.01] on 9/23/2023     History of Present Illness: Stephanie is a 78 year old with a history of CKD stage 4, HFrEF, depression, anxiety, breast cancer on oral chemotherapy, lung cancer on oral chemotherapy, insomnia, cardiomyopathy, atrial fibrillation, asthma, former smoker, and oxygen dependent COPD. She sees Dr. Dietrich at [facility], she is on oxygen at baseline, 2L via nasal cannula continuous. Stephanie is familiar to our service as she is a participant in our call back program and we have seen her as an inpatient a few times since the spring of 2016. She presented to the ED via EMS on CPAP after increased shortness of breath about 30 minutes prior to arrival in the ED. He  was also admitted to the ICU at the time although subsequently passed Sunday night. Stephanie has been treated with NIV, steroid, antibiotic, diuretic, supplemental oxygen, and bronchodilators.She is also being followed by Cardiology and Palliative Care this visit.    Last PFT:  Date: 10/4/2019  Post-Spirometry:  FVC: 2.23, 88%, +24%  FEV1: 1.14, 58%, +34%  FEV1/FVC: 51%    Home Respiratory Medications:  Bronchodilators:   -albuterol inhaler PRN   -Atrovent inhaler PRN  Combination Therapy:   -Trelegy Ellipta 1 puff daily  Other:   -Mucomyst 10% 4ml PRN   -9% hypertonic saline neb PRN    Imaging:  XR CHEST PORT 1 VIEW  DATE: 9/23/2023  INDICATION: shortness of breath, COPD vs CHF exacerbation                                                                 IMPRESSION: Heart size is stable. Right midlung nodule measuring 2.5 cm, unchanged. Decrease in linear parenchymal opacity in the left midlung. Chronic interstitial scarring/fibrosis bilaterally. There is  some increasing pleural thickening in the left   upper lobe. No pneumothorax. Old right rib fracture.    Relevant Labs:   Latest Reference Range & Units 09/23/23 16:11 09/23/23 16:12 09/25/23 05:58 09/26/23 05:20   N-Terminal Pro Bnp 0 - 1,800 pg/mL 6,037 (H)      Troponin T, High Sensitivity <=14 ng/L 43 (H)      Ph Venous 7.35 - 7.45   7.42     PCO2 Venous 35 - 50 mm Hg  56 (H)     PO2 Venous 25 - 47 mm Hg  19 (L)     O2 Sat, Venous 70.0 - 75.0 %  26.7 (L)     Bicarbonate Venous 24 - 30 mmol/L  36 (H)     Base Excess Venous mmol/L  11.4     WBC 4.0 - 11.0 10e3/uL 9.4  10.8 13.2 (H)   Hemoglobin 11.7 - 15.7 g/dL 11.0 (L)  9.7 (L) 9.9 (L)     Assessment: Patient currently is grieving her  who passed on Sunday evening. This is her 5th admission in the last 12 months, she has had a Palliative Care consult this visit and has seen them as an outpatient also. Per their visit with Stephanie and her daughter she wants her goals of care are still restorative. Unsure if she fully realizes the extent of her health and eventual likely pineda due to her cancer therapy being palliative and multiple hospitalizations. Stephanie also has had times of confusion, unsure if this is medication related, fatigue, and/or onset of dementia.    Education:  -Will not be done in person this visit due to her grieving the loss of her  and we are already in contact with her as a participant in our call back program.    Recommendations:  -Continue current inpatient therapy, per [facility] protocols, if not improving would recommend scheduled albuterol nebulizer  -Follow up appointment with Pulmonology due to repeated admissions for respiratory failure  -Follow up  appointment with Cardiology for her heart failure due to repeated admission for respiratory failure and high fluid level on admission  -Consult to Hospice for information on their services, encouragement to highly consider it's benefits to her  -Continued outpatient follow up with  Palliative Care with on going goals of care discussion with quality of life discussions  -Medications patient is currently taking at home appear to be optimized already by her pulmonologist, recommend no changes at this time..  -OT/PT evaluation for safe discharge planning, patient has had falls at home with in the last resulting in hospitalization  -Home oxygen evaluation prior to discharge to ensure correct prescription    Stephanie will continue to participate in our call back program. Will continue to follow patient throughout hospital stay along with educating patient and family.    Total time spent 60 minutes spent in chart review, care coordination, and documentation.    Safia Davila RT, Chronic Pulmonary Disease Specialist  Phone [phone]

## 2023-09-26 NOTE — CONSULTS
SPIRITUAL HEALTH SERVICES Consult Note      SHS attempted multiple times to visit pt over the last two days, each time pt declined at that moment but welcomes SHS support.  Family has been supportive through pts stay  Writer will cont to visit through TANYA Guerrero M.Div., [facility]  Staff    [phone]

## 2023-09-27 NOTE — PROGRESS NOTES
CLINICAL NUTRITION SERVICES - ASSESSMENT NOTE     Nutrition Prescription    RECOMMENDATIONS FOR MDs/PROVIDERS TO ORDER:  None    Malnutrition Status:    Patient does not meet two of the established criteria necessary for diagnosing malnutrition but is at risk for malnutrition    Recommendations already ordered by Registered Dietitian (RD):  - Medical food supplement therapy - Ensure Enlive between meals w/ ice @10am and 2pm (strawberry flavor)    Future/Additional Recommendations:  Monitor po intake, ONS intake/tolerance,      REASON FOR ASSESSMENT  Irene León is a/an 78 year old female assessed by the dietitian for Admission Nutrition Risk Screen for positive: unsure wt loss, decreased appetite    NUTRITION HISTORY  Dx: acute hypoxic respiratory failure   PMH: CHF with reduced ejection fraction, cardiomyopathy, severe COPD with chronic home oxygen 3 L at rest and 5-6 L with exertion, CAD, paroxysmal A-fib on Xarelto, CKD stage IV, lung cancer on oral chemo and breast cancer on oral chemo   - NKFA    Met with pt in room this morning. Pt reports decreased appetite over the past year, and especially past few months - partially 2/2 taking care of  w/ dementia at home (pt's  passed away 3 days ago). Pt reports forgetting to eat meals at times, usually lunch/afternoon meals, and is aware she goes through periods of wt loss and wt gain.     Pt follows a low sodium diet at home, and asked questions/requested info about how to follow that diet better. Pt and  had been getting low sodium meals delivered from [facility], but pt reports not liking many of them, and having to throw some away so she discontinued getting them.     Pt take Premier Protein shakes at home daily, and would like to have shakes ordered for her while admitted. She's OK with Ensure Enlive (prefers strawberry w/ ice)    CURRENT NUTRITION ORDERS  Diet: Low Saturated Fat/2400 mg Sodium  Intake/Tolerance: Pt ordering 3  "adequate meals/day and consuming % of them per flow sheets and [employer]    LABS  Labs reviewed  BUN: 26.3 (H)  Cr: 1.41 (H)    MEDICATIONS  Medications reviewed  Bumex  Pepcid  Prednisone    GI:   LBM on 9/24 x5    ANTHROPOMETRICS  Height: 150 cm (4' 11\")  Most Recent Weight: 48.6 kg (107 lb 1.6 oz)    IBW: 45 kg  BMI: Normal BMI  Weight History:   09/25/23 : 48.6 kg (107 lb 1.6 oz)  09/19/23 : 51.3 kg (113 lb 1.5 oz)  09/11/23 : 47.2 kg (104 lb)  08/29/23 : 48.5 kg (107 lb)  08/15/23 : 50.8 kg (112 lb)  08/02/23 : 51 kg (112 lb 8 oz)  07/04/23 : 50.5 kg (111 lb 5.3 oz)  06/25/23 : 50.9 kg (112 lb 3.2 oz)  05/18/23 : 50.3 kg (111 lb)  04/17/23 : 49.2 kg (108 lb 8 oz)  10/11/22 : 47.2 kg (104 lb)  08/11/22 : 47.6 kg (105 lb)  07/13/22 : 45.2 kg (99 lb 9.6 oz)  06/15/22 : 46.7 kg (103 lb)  Pt's wt has fluctuated 2-3 kg but has remained relatively stable over the past year.    Dosing Weight: 48.6 kg actual body wt based on driest admit wt    ASSESSED NUTRITION NEEDS  Estimated Energy Needs: 3860-8333 kcals/day (25 - 30 kcals/kg)  Justification: Maintenance  Estimated Protein Needs: 58-73 grams protein/day (1.2 - 1.5 grams of pro/kg)  Justification: Repletion, resp failure  Estimated Fluid Needs: 1531-0953 mL/day (25 - 30 mL/kg)   Justification: Maintenance    PHYSICAL FINDINGS  Bruises on legs    MALNUTRITION  % Intake: Decreased intake does not meet criteria  % Weight Loss: Weight loss does not meet criteria  Subcutaneous Fat Loss: None noted  Muscle Loss: Scapular bone:  mild, Thoracic region (clavicle, acromium bone, deltoid, trapezius, pectoral):  mild, Upper arm (bicep, tricep):  moderate, Lower arm  (forearm):  moderate, and Dorsal hand:  mild  Fluid Accumulation/Edema: +1 trace in BLE legs, ankles, feet  Malnutrition Diagnosis: Patient does not meet two of the established criteria necessary for diagnosing malnutrition but is at risk for malnutrition    NUTRITION DIAGNOSIS  Inadequate oral intake related " to poor appetite, forgetting meals as evidenced by moderate muscle loss, report of frequently missing meals      INTERVENTIONS  Implementation  - Nutrition Education: Provided education on CHF diet, ways to limit sodium, food lists   - Medical food supplement therapy - Ensure Enlive between meals w/ ice @10am and 2pm (strawberry flavor)    Goals  Patient to consume % of nutritionally adequate meal trays TID, or the equivalent with supplements/snacks.     Monitoring/Evaluation  Progress toward goals will be monitored and evaluated per protocol.

## 2023-09-27 NOTE — PLAN OF CARE
Problem: Plan of Care - These are the overarching goals to be used throughout the patient stay.    Goal: Absence of Hospital-Acquired Illness or Injury  Intervention: Identify and Manage Fall Risk  Recent Flowsheet Documentation  Taken 9/27/2023 1700 by Thalia Chua RN  Safety Promotion/Fall Prevention:   activity supervised   assistive device/personal items within reach   clutter free environment maintained   lighting adjusted   mobility aid in reach   nonskid shoes/slippers when out of bed   patient and family education   room organization consistent   supervised activity   safety round/check completed   room near nurse's station   Goal Outcome Evaluation:      Plan of Care Reviewed With: patient    Overall Patient Progress: improvingOverall Patient Progress: improving pt alert and oriented vss on 3 liters of oxygen, able to voice needs, requires assist of one, using purewick,telemetry discontinued, for TCU transfer today.

## 2023-09-27 NOTE — PROGRESS NOTES
[facility]    Progress Note - Hospitalist Service       Date of Admission:  9/23/2023    Assessment & Plan   Irene León is a 78 year old female admitted on 9/23/2023. She has a history of CHF with reduced ejection fraction (most recently EF 20-25% on 6/22), cardiomyopathy, severe COPD with chronic home oxygen 3 L at rest and 5-6 L with exertion, CAD, paroxysmal A-fib on Xarelto, CKD stage IV, lung cancer on oral chemo and breast cancer on oral chemo who is admitted for acute hypoxic respiratory failure.     Acute on chronic hypoxic respiratory failure   Possible COPD exacerbation   Patient with history of severe COPD on home oxygen at 3 LPM. Acutely SOB at home on day of admission requiring BiPAP by EMS. VBG on arrival with pH 7.42 with CO2 of 56. Recevied duonebs x2 and solumedrol in ED, transitioned to 5L O2 via oxymask where she stabilized. Early 9/26/23 AM, her oxygen requirement decreased back down to 3 LPM. Patient has been hospitalized for respiratory failure several times over the last few months, Likely multifactorial with COPD contributing and CHF contributing as below. Patient was previously full code but was transitioned to DNR/DNI status after much discussion in ED on 9/23/23. Hospice/palliative team has been consulted and will be involved.  - Continue PTA inhalers, mucomyst, tessalon perles  - Duonebs and Mucomyst PRN   - S/p solumedrol in ER then transitioned to prednisone 40mg   - Started on doxycycline and ceftriaxone in the ED   -Continue doxycycline (9/23-)   -Low suspicion for infectious etiology so will discontinue ceftriaxone (9/23-9/24) with low threshold to resume if patient clinical status acutely worsening  - Respiratory panel and sputum cultures pending collection as able  - Strict I&O's and daily weights  - Continuous pulse ox   - Supplemental O2 as needed to maintain oxygen saturations between 88%-92%, RRT is on board to manage further  -  Hospice/palliative consulted and following, appreciate recommendations     CHF with reduced EF 20%-25%  Cardiomyopathy  Possible CHF exacerbation   Recent hospitalization for respiratory failure 2/2 CHF. BNP on admission ~6,000 (noted to be 9,600 on recent admission 9/15/23). Troponin elevated on admission to 43 but was 44 two days prior so no significant change. CHF likely contributing to respiratory failure.  - Switched from Lasix to Bumex on 9/24/23 per Cardiology as below   -Last dose of Lasix 9/24/23 AM   -Bumex held 9/24/23 in setting of patient spending time with  in ICU and low blood pressures. First dose IV Bumex given 9/25/23.  - Cardiology consulted and following, appreciate recommendations   -Continue bumetanide 1 mg twice daily.  -Continue on rivaroxaban for anticoagulation and statin therapy  -Consider outpatient appointment with EP to discuss CRT device.    - Strict I&O's and daily weights   - Continuous pulse ox   - Supplemental O2 as needed to maintain oxygen saturations 88-92% though as above, RRT is on board for further management  - Cardiac tele resumed 9/25/23 in light of possible CHF exacerbation and addition of possible QTC prolonging medication     CKD stage 4  Cr 1.73 on admission, GFR 30, BUN 24.3. 9/25/23 creatinine 1.59, GFR 33, and BUN 27.5. This continues to be stable.  - AM BMP      Lower extremity wounds  - WOC consult placed 9/23/23, appreciate recommendations     Right food swelling  9/23/23 right foot XR showing that the right foot is negative for fracture, there is significant soft tissue swelling along the dorsal aspect of the foot, cellulitis could also have this appearance. Area of swelling is bruised and raised, appears to be hematoma but will continue to monitor daily for any changes. Tenderness to palpation but no surrounding erythema or warmth, and no fevers.   - Continues to improve on exam and per patient    Insomnia  Patient reporting minimal/no sleep despite  melatonin 10 mg. Would be interested in additional medication for sleep/anxiety.   -PRN hydroxyzine 25-50 mg daily   -Patient has history of QTC prolongation so will monitor closely with cardiac tele as above which was also started due to possible CHF exacerbation    Leukocytosis   Leukocytosis in the setting of steroid use. Patient clinically improving and feeling well, so suspect leukocytosis is secondary to steroid use but will continue to monitor.  - Trend CBC     Chronic conditions  Paroxysmal A. Fib: continue PTA DOAC (Xarelto) and PTA metoprolol   Lung cancer: follows with Dr. Suarez at [facility], currently oral chemo medication is on hold due to side effects   Breast cancer: follows with Dr. Suarez at [facility], continue daily PTA tamoxifen        Diet: Combination Diet Regular Diet Adult; 2 gm NA Diet; Low Saturated Fat Diet  Snacks/Supplements Adult: Ensure Enlive; Between Meals    DVT Prophylaxis: PTA DOAC  Hayes Catheter: Not present  Fluids: None  Lines: None     Cardiac Monitoring: ACTIVE order. Indication: QTc prolonging medication (48 hours)  Code Status: No CPR- Do NOT Intubate      Clinically Significant Risk Factors                      # Hypertension: Noted on problem list    # Acute heart failure with reduced ejection fraction: last echo with EF <40% and receiving IV diuretics           # COPD: noted on problem list          Disposition Plan     Expected Discharge Date: 09/28/2023        Discharge Comments: Palliative consult, O2, PT/OT consult        The patient's care was discussed with the Attending Physician, Dr. Quinonez .    JOSE MIGUEL LAMAS MD  Hospitalist Service  [facility]  Securely message with [employer] (more info)  Text page via [employer] Paging/Directory   ______________________________________________________________________    Interval History   No acute events overnight.  Patient reports continued improvement in her symptoms. She reports great improvement in her lower  extremity swelling and continued improvement in her breathing. No fevers, new or worsening cough, changes in bowel movements. Reports burning along irritated buttocks but no burning per urethra. Continues to endorse frequent urination and difficulty ambulating to the bathroom. Discussed barriers to discharge that this can cause, and patient is in agreement that working on strengthening via PT/OT here at the hospital and at TCU on discharge is of great importance/priority.    Physical Exam   Vital Signs: Temp: 97.9  F (36.6  C) Temp src: Oral BP: 127/63 Pulse: 68   Resp: 18 SpO2: 93 % O2 Device: Nasal cannula Oxygen Delivery: 3 LPM  Weight: 107 lbs 1.6 oz    Constitutional: awake, alert, comfortable-appearing, laying in bed, no acute distress  HEENT: Lids and lashes normal, sclera clear, conjunctiva normal. Normocephalic and atraumatic. No rhinorrhea. Moist mucus membranes.   Respiratory: Patient breathing comfortably on 3L O2 via oxymask, no increased work of breathing or respiratory distress.  Cardiovascular: Regular rate and rhythm, skin is warm and well-perfused.  Skin: Bruising throughout extremities. Several chronic-appearing scabs/lesions and dry skin throughout bilateral lower extremities. Raised, bruised area on dorsal aspect of patient's right foot that is tender to palpation but has no surrounding erythema or warmth. Swelling is improved compared to examination yesterday.  Musculoskeletal: Trace pitting edema of bilateral lower extremities.  Neurologic: Awake, alert, oriented to name, place and time. No apparent focal deficits.    Data     I have personally reviewed the following data over the past 24 hrs:    14.9 (H)  \   10.8 (L)   / 212     143 107 26.3 (H) /  80   4.4 29 1.41 (H) \       Imaging results reviewed over the past 24 hrs:   No results found for this or any previous visit (from the past 24 hour(s)).

## 2023-09-27 NOTE — PROGRESS NOTES
Family adamant that Purewick is to be discontinued and for patient to void in commode/bathroom. Patient refusing removal of Purewick r/t diuretic use. Patient educated and encouraged to increase mobility and to use commode/bathroom. Patient acknowledged teaching but refused removal of Purewick.

## 2023-09-27 NOTE — PROGRESS NOTES
Cardiology Progress Note    Assessment/Plan:  1.  Acute hypoxic respiratory failure, likely secondary to acute COPD exacerbation although also contribution from mild acute decompensated heart failure.  Slight diuresis with oral Bumex.  We will continue current regimen.  2.  Paroxysmal atrial fibrillation, presently in sinus rhythm.  Continue low-dose metoprolol and rivaroxaban to reduce risk of thromboembolic events.  3.  Nonischemic cardiomyopathy, LVEF 20 to 25% with longstanding left bundle branch block.  May be a candidate for CRT, although would need better idea of her prognosis from respiratory standpoint.  4.  Nonobstructive coronary artery disease  5.  Stage III CKD, stable  6.  Severe centrilobular emphysema    Primary cardiologist: Dr. Gaby Chilel    Subjective:  Patient reports improvement in her breathing since admission.  Blames herself for not following a low-sodium diet and smoking cigarettes over all these years.     bumetanide  1 mg Oral BID    doxycycline hyclate  100 mg Oral Daily    escitalopram  10 mg Oral Daily    famotidine  20 mg Oral Every Other Day    fluticasone-vilanterol  1 puff Inhalation At Bedtime    And    umeclidinium  1 puff Inhalation At Bedtime    gabapentin  600 mg Oral At Bedtime    metoprolol succinate ER  50 mg Oral At Bedtime    mirabegron  25 mg Oral Daily    OLANZapine  5 mg Oral At Bedtime    predniSONE  40 mg Oral Daily    rivaroxaban ANTICOAGULANT  15 mg Oral At Bedtime    spironolactone  25 mg Oral Daily    tamoxifen  20 mg Oral Daily    tolterodine ER  2 mg Oral Daily         Objective:   Vital signs in last 24 hours:  Temp:  [97.5  F (36.4  C)-98.1  F (36.7  C)] 97.9  F (36.6  C)  Pulse:  [67-75] 67  Resp:  [18] 18  BP: ()/(51-83) 170/74  SpO2:  [92 %-99 %] 94 %  Weight:        Review of Systems:  Negative    Physical Exam:  General appearance: alert, cooperative, no distress   Head: Normocephalic, without obvious abnormality, atraumatic  Neck: no JVD    Lungs: Diminished breath sounds bilaterally with few crackles at the right base noted.  Heart: Regular rate and rhythm.  S1, S2 normal.  No murmur or gallop  Extremities: No peripheral edema bilaterally    Cardiographics (personally reviewed):   Telemetry demonstrates sinus rhythm    Imaging (personally reviewed):   No new cardiac imaging    Lab Results (personally reviewed):     No results for input(s): CHOL, HDL, LDL, TRIG, CHOLHDLRATIO in the last 85696 hours.  No results for input(s): LDL in the last 77119 hours.  Recent Labs   Lab Test 09/27/23  0520      POTASSIUM 4.4   CHLORIDE 107   CO2 29   GLC 80   BUN 26.3*   CR 1.41*   GFRESTIMATED 38*   MESSI 8.6*     Recent Labs   Lab Test 09/27/23  0520 09/26/23  0520 09/25/23  0558   CR 1.41* 1.37* 1.59*     No results for input(s): A1C in the last 13681 hours.       Recent Labs   Lab Test 09/27/23  0520   WBC 14.9*   HGB 10.8*   HCT 36.2   MCV 96        Recent Labs   Lab Test 09/27/23  0520 09/26/23  0520 09/25/23  0558   HGB 10.8* 9.9* 9.7*    Recent Labs   Lab Test 08/27/23  0608 08/27/23  0101 07/03/23  1125   TROPONINI 0.07 0.07 0.04     Recent Labs   Lab Test 09/23/23  1611 09/15/23  1953 08/27/23  0608 08/27/23  0101 07/03/23  1125   BNP  --   --  588* 456* 200*   NTBNP 6,037* 9,602*  --   --   --      No results for input(s): TSH in the last 18912 hours.  Recent Labs   Lab Test 08/27/23  0101 06/17/23  1217 02/20/21  2040   INR 1.05 1.28* 1.16*          Gaby Chilel MD

## 2023-09-27 NOTE — PLAN OF CARE
Problem: Plan of Care - These are the overarching goals to be used throughout the patient stay.    Goal: Plan of Care Review  Description: The Plan of Care Review/Shift note should be completed every shift.  The Outcome Evaluation is a brief statement about your assessment that the patient is improving, declining, or no change.  This information will be displayed automatically on your shift note.  Outcome: Progressing   Goal Outcome Evaluation:  Weaned to baseline 3L oxygen with nasal cannula and sats maintained > 90% at rest. Tramadol administered for lower back and right foot pain. Continues to work with PT/OT. Stable vitals and telemetry rhythm

## 2023-09-27 NOTE — PLAN OF CARE
Problem: Plan of Care - These are the overarching goals to be used throughout the patient stay.    Goal: Optimal Comfort and Wellbeing  Intervention: Monitor Pain and Promote Comfort   Goal Outcome Evaluation:  Patient C/O pain to buttock and low back. PRN Tylenol and Hydroxyzine administered for pain control. Turned and repositioned for comfort. Voided. On Oxygen at 5L via NC. Telemetry reading sinus Rhythm with bundle branch block, prolonged QT with PVC's.  IV saline locked. Call light within reach and chair alarms activated for safety.

## 2023-09-27 NOTE — PROGRESS NOTES
Care Management Follow Up    Length of Stay (days): 4    Expected Discharge Date: 09/28/2023     Concerns to be Addressed:       Patient plan of care discussed at interdisciplinary rounds: Yes    Anticipated Discharge Disposition:  (TBD)  Disposition Comments: CM continue to follow and assist with D/C planning. May want to return home with added hospice services. Daughter will transport. Daughter is primary family contact.  Anticipated Discharge Services:  (TBD)  Anticipated Discharge DME:  (TBD- currently on home 02 supplied by [employer] and has portable)    Patient/family educated on Medicare website which has current facility and service quality ratings:  (None at this time)  Education Provided on the Discharge Plan:  (AVS per bedside RN)  Patient/Family in Agreement with the Plan:      Referrals Placed by CM/SW:  (None at this time)  Private pay costs discussed: Not applicable    Additional Information:  SW spoke with daughter to discuss discharge planning for pt and recommendations of TCU; daughter stated that she and pt are agreeable to TCU at discharge and would like to go to [facility] as she has been there in the past. Additional referrals sent to [facility], [facility]. Care management following for discharge planning.  9:59 AM    Pt accepted to [facility] TCU (bed secured per pt request). Goal is to discharge tomorrow, and daughter is able to transport. Needs PAS. Care management following.  1:58 PM    JOSE Botello  9/27/2023

## 2023-09-27 NOTE — PROGRESS NOTES
SPIRITUAL HEALTH SERVICES Progress Note      Saw pt Irene León per consult order    Patient/Family Understanding of Illness and Goals of Care - Stephanie has struggled with breathing.     Distress and Loss - Stephanei's   in the hospital recently she is actively grieving his death     Strengths, Coping, and Resources - Stephanie named that having to focus on he own care and wellbeing has given her the ability to cope with her husbands death     Meaning, Beliefs, and Spirituality - She requested Buddhist sacraments.     Plan of Care - [facility] will cont to assess and support through Priest TANYA visit requested      Emir Guerrero M.Div., Psychiatric  Staff    [phone]

## 2023-09-28 NOTE — PROGRESS NOTES
SW assisted assigned care manager to complete PAS for TCU admission; [id number].    JOSE Botello  9/28/2023

## 2023-09-28 NOTE — PROGRESS NOTES
SPIRITUAL HEALTH SERVICES Progress Note      Saw pt Irene León per follow up for grief support     Patient/Family Understanding of Illness and Goals of Care - Stephanie is discharging today to TCU with the hopes of increasing O2 levels    Distress and Loss - Stephanie continues to process her grief around her husbands death.    Strengths, Coping, and Resources - Stephanie has a resilient spirit.  She has had multiple losses's through her life which have have familiarized her with the grieving/healing process.  She repeated often the theme of acceptance and was future oriented.    Meaning, Beliefs, and Spirituality -not explored    Plan of Care - [facility] available per request      Emir Guerrero M.Div., [facility]  Staff    [phone]

## 2023-09-28 NOTE — PROGRESS NOTES
RT was called to the bedside, pt was on 5L NC, SpO2 mid 90s, RN turned down O2 to 3 LPM, SpO2 93%. Pt complained of pain below the right chest, SOB due to pain. BS clear;diminished. RN gave pt albuterol inhaler at 1230.     Solomon Mace, RT

## 2023-09-28 NOTE — PROGRESS NOTES
SPIRITUAL HEALTH SERVICES Progress Note  WW  340    [facility] asked to support family following Stpehanie have an acute pain event just prior to discharge.      Patient/Family Understanding of Illness and Goals of Care - Pt had an acute pain event just prior to discharge which delayed discharged    Distress and Loss - Writer supported family through presence, enageging in grief work, pts  had passed early in the week and pts dtr expressed complicate grief that appeared to be amplified by pts acute pain.     Strengths, Coping, and Resources - NA    Meaning, Beliefs, and Spirituality - Prayer/[place] was offered per pateints request     Plan of Care - [facility] will cont to assess and support through TANYA Guerrero M.Div., [facility]  Staff    [phone]

## 2023-09-28 NOTE — DISCHARGE SUMMARY
[facility]  Discharge Summary - Medicine & Pediatrics       Date of Admission:  9/23/2023  Date of Discharge:  9/28/2023  Discharging Provider: Dr. Lennox Quinonez  Discharge Service: Hospitalist Service    Discharge Diagnoses   Patient Active Problem List   Diagnosis    Left hip pain    Nonischemic cardiomyopathy (H)    COPD (chronic obstructive pulmonary disease) (H)    Benign essential hypertension    Chronic kidney disease, stage IV (severe) (H)    Factor 5 Leiden mutation, heterozygous (H)    Senile osteoporosis    COPD exacerbation (H)    Community acquired pneumonia of left upper lobe of lung    Acute on chronic diastolic congestive heart failure (H)    Atrial fibrillation with rapid ventricular response (H)    Weight loss    Acute on chronic respiratory failure with hypoxia (H)    Prolonged Q-T interval on ECG    Neutrophilia    Abscess of upper lobe of left lung with pneumonia (H)    Invasive ductal carcinoma of breast, female, left (H)    ACP (advance care planning)    Anisocoria    At high risk for impaired skin integrity    Trochanteric bursitis    Chronic anticoagulation    Chronic systolic congestive heart failure (H)    Closed fracture of hip with delayed healing    Compression fracture of L1 vertebra, sequela    Depression with anxiety    Dyslipidemia    Dyspnea    Exacerbation of intermittent asthma, unspecified asthma severity    Family history of blood disease    Fibrocystic breast    Fissure in skin of foot    Fracture of femur (H)    Gait disorder    GERD (gastroesophageal reflux disease)    Herniated intervertebral disc    Groin pain    Herpes zoster without complication    High frequency sensorineural hearing loss of left ear    Patient's other noncompliance with medication regimen    Hot flashes due to tamoxifen    Hot flashes, menopausal    Hyperlipidemia    Hypoxia    Idiopathic edema    Insomnia    Malignant neoplasm of central portion of left female breast (H)     Metabolic encephalopathy    Unspecified asthma, uncomplicated    Muscle weakness (generalized)    OAB (overactive bladder)    Osteoarthritis of hip    Osteoporosis    Other abnormalities of gait and mobility    Other chronic sinusitis    Pain due to fracture    Personal history of malignant neoplasm of breast    Personal history of transient ischemic attack (TIA), and cerebral infarction without residual deficits    Physical deconditioning    Piriformis syndrome    Post menopausal syndrome    Pulsatile tinnitus of both ears    S/P hip replacement, left    Sacro-iliac pain    Sacral insufficiency fracture    T12 compression fracture (H)    Tendonitis    Urge incontinence    Urinary incontinence    Wedge compression fracture of t11-T12 vertebra, subsequent encounter for fracture with routine healing    Heart failure with reduced ejection fraction (H)    Leukocytosis, unspecified type    Acute pneumonia    Pedal edema    Chronic combined systolic and diastolic congestive heart failure (H)    Pneumonia of left upper lobe due to infectious organism    Malignant neoplasm of upper lobe of right lung (H)    Heart failure, systolic, acute on chronic (H)       Follow-ups Needed After Discharge     Follow up with skilled nursing physician.  The following labs/tests are   recommended: BMP, CBC.    Follow up with primary outpatient cardiologist.         Discharge Disposition   Discharged to short-term care facility  Condition at discharge: Stable    Hospital Course   Irene León was admitted on 9/23/2023. She has a history of CHF with reduced ejection fraction (most recently EF 20-25% on 6/22/23), cardiomyopathy, severe COPD with chronic home oxygen 3 L at rest and 5-6 L with exertion, CAD, paroxysmal A-fib on Xarelto, CKD stage IV, lung cancer on oral chemo and breast cancer on oral chemo who is admitted for acute hypoxic respiratory failure like multifactorial in the setting of COPD and CHF.    Patient with history of severe  COPD on home oxygen at 3 LPM. Acutely SOB at home on day of admission requiring BiPAP by EMS. VBG on arrival with pH 7.42 with CO2 of 56. Recevied duonebs x2 and solumedrol in ED, transitioned to 5L O2 via oxymask where she stabilized. Early 9/26/23 AM, her oxygen requirement decreased back down to 3 LPM. It has since fluctuated between 3-5 LPM. In addition, increased LE edema was noted on admission. Cardiology was consulted for transition from Lasix to Bumex. There was good resolution of LE edema by day of discharge, and patient reported good tolerance of the Bumex. Throughout hospitalization, patient remained afebrile with no new fevers, chills, worsening cough, or worsening SOB. She did receive short course of IV ceftriaxone (9/23-9/24) and a full course of doxycycline (9/23-9/28). She also received 5-day course of prednisone while hospitalized, will continue with taper. Of note, patient's  and primary caregiver passed away in the ICU during this same hospitalization. Therefore, discharge to home is complicated by independent living in a home with stairs and change in caregiver status.    The following problems were addressed during her hospitalization:    Acute on chronic hypoxic respiratory failure   Possible COPD exacerbation   Patient with history of severe COPD on home oxygen at 3 LPM. Acutely SOB at home on day of admission requiring BiPAP by EMS. VBG on arrival with pH 7.42 with CO2 of 56. Recevied duonebs x2 and solumedrol in ED, transitioned to 5L O2 via oxymask where she stabilized. Early 9/26/23 AM, her oxygen requirement decreased back down to 3 LPM. Patient has been hospitalized for respiratory failure several times over the last few months.  - Continue PTA inhalers, mucomyst, tessalon perles  - Duonebs and Mucomyst PRN   - S/p solumedrol in ER then transitioned to prednisone 40 mg; will transition to taper with 5-day course of 20 mg daily followed by 5-day course of 10 mg daily  - Antibiotics  course: ceftriaxone (9/23-9/24) and doxycycline (9/23-9/28)  - Supplemental O2 as needed to maintain oxygen saturations between 88%-92%     CHF with reduced EF 20%-25%  Cardiomyopathy  Possible CHF exacerbation   Recent hospitalization for respiratory failure 2/2 CHF. BNP on admission ~6,000 (noted to be 9,600 on recent admission 9/15/23). Troponin elevated on admission to 43 but was 44 two days prior so no significant change.   - Switched from Lasix to Bumex on 9/24/23 per Cardiology as below              -Last dose of Lasix 9/24/23 AM              -Continue bumetanide 1 mg twice daily.  -Continue on rivaroxaban for anticoagulation and statin therapy  -Consider outpatient appointment with EP to discuss CRT device.       CKD stage 4  Cr 1.73 on admission, GFR 30, BUN 24.3. 9/25/23 creatinine 1.59, GFR 33, and BUN 27.5. This continued to be stable.     Lower extremity wounds  WOC consulted.     Right food swelling  9/23/23 right foot XR showing that the right foot is negative for fracture, there is significant soft tissue swelling along the dorsal aspect of the foot, cellulitis could also have this appearance. Area of swelling is bruised and raised, appears to be hematoma but will continue to monitor daily for any changes. Tenderness to palpation but no surrounding erythema or warmth, and no fevers. Improved throughout hospitalization.     Insomnia  PRN hydroxyzine 25-50 mg daily; patient has history of QTC prolongation and is aware of potential risk. Can continue melatonin as well.     Leukocytosis   Leukocytosis in the setting of steroid use. Patient clinically improving and feeling well, so suspect leukocytosis is secondary to steroid use but would continue to monitor.     Chronic conditions  Paroxysmal A. Fib: continue PTA DOAC (Xarelto) and PTA metoprolol   Lung cancer: follows with Dr. Suarez at [facility], currently oral chemo medication is on hold due to side effects   Breast cancer: follows with Dr. Suarez at [facility],  daily PTA tamoxifen on hold    Consultations This Hospital Stay   CARE MANAGEMENT / SOCIAL WORK IP CONSULT  RESPIRATORY CARE IP CONSULT  WOUND OSTOMY CONTINENCE NURSE  IP CONSULT  CARE MANAGEMENT / SOCIAL WORK IP CONSULT  CARDIOLOGY IP CONSULT  PALLIATIVE CARE ADULT IP CONSULT  SPIRITUAL HEALTH SERVICES IP CONSULT  SPIRITUAL HEALTH SERVICES IP CONSULT  PHYSICAL THERAPY ADULT IP CONSULT  OCCUPATIONAL THERAPY ADULT IP CONSULT    Code Status   No CPR- Do NOT Intubate       The patient was discussed with Dr. Cristiano LAMAS MD  [address] [address]  Phone: [phone]  Fax: [phone]  ______________________________________________________________________    Physical Exam   Vital Signs: Temp: 98.4  F (36.9  C) Temp src: Oral BP: (!) 157/70 Pulse: 69   Resp: 18 SpO2: 91 % O2 Device: Nasal cannula Oxygen Delivery: 4 LPM  Weight: 107 lbs 1.6 oz    Constitutional: awake, alert, comfortable-appearing, laying in bed, no acute distress  HEENT: Lids and lashes normal, sclera clear, conjunctiva normal. Normocephalic and atraumatic. No rhinorrhea. Moist mucus membranes.   Respiratory: Patient breathing comfortably on 3L O2 via oxymask, no increased work of breathing or respiratory distress. Intermittent cough unchanged from previous.  Cardiovascular: Regular rate and rhythm, skin is warm and well-perfused.  Skin: Bruising throughout extremities. Several chronic-appearing scabs/lesions and dry skin throughout bilateral lower extremities. Raised, bruised area on dorsal aspect of patient's right foot that is tender to palpation but has no surrounding erythema or warmth. Swelling continues to improve.  Musculoskeletal: Trace pitting edema of bilateral lower extremities.  Neurologic: Awake, alert, oriented to name, place and time. No apparent focal deficits.      Primary Care Physician   Pankaj Montanez    Discharge Orders       General info for SNF    Length of Stay Estimate: Short Term Care: Estimated # of Days <30  Condition at Discharge: Stable  Level of care: skilled   Rehabilitation Potential: Good  Admission H&P remains valid and up-to-date: Yes  Recent Chemotherapy: N/A  Use Nursing Home Standing Orders: Yes     Mantoux instructions    Give two-step Mantoux (PPD) Per Facility Policy Yes     Reason for your hospital stay    Acute on chronic hypoxic respiratory failure, likely multifactorial with COPD and CHF exacerbation     Follow Up and recommended labs and tests    Follow up with care home physician.  The following labs/tests are recommended: BMP, CBC.    Follow up with primary outpatient cardiologist.     Activity - Up with nursing assistance     No CPR- Do NOT Intubate     Fall precautions     Diet    Follow this diet upon discharge: Orders Placed This Encounter      Snacks/Supplements Adult: Ensure Enlive; Between Meals      Combination Diet Regular Diet Adult; 2 gm NA Diet; Low Saturated Fat Diet       Significant Results and Procedures   Results for orders placed or performed during the hospital encounter of 09/23/23   XR Chest Port 1 View    Narrative    EXAM: XR CHEST PORT 1 VIEW  LOCATION: [facility]  DATE: 9/23/2023    INDICATION: shortness of breath, COPD vs CHF exacerbation  COMPARISON: 09/15/2023      Impression    IMPRESSION: Heart size is stable. Right midlung nodule measuring 2.5 cm, unchanged. Decrease in linear parenchymal opacity in the left midlung. Chronic interstitial scarring/fibrosis bilaterally. There is some increasing pleural thickening in the left   upper lobe. No pneumothorax. Old right rib fracture.   XR Foot Port Right 2 Views    Narrative    EXAM: XR FOOT PORT RIGHT 2 VIEWS  LOCATION: [facility]  DATE: 9/23/2023    INDICATION: pain, bruisng, swelling over 5th metatarsal  COMPARISON: None.      Impression    IMPRESSION: The right foot is  negative for fracture. There is significant soft tissue swelling along the dorsal aspect of the foot. Cellulitis could also have this appearance.     *Note: Due to a large number of results and/or encounters for the requested time period, some results have not been displayed. A complete set of results can be found in Results Review.       Discharge Medications   Current Discharge Medication List        CONTINUE these medications which have NOT CHANGED    Details   acetaminophen (TYLENOL) 500 MG tablet Take 1,000 mg by mouth every 8 hours as needed for mild pain or fever       acetylcysteine (MUCOMYST) 10 % nebulizer solution Inhale 4 mLs into the lungs every 4 hours as needed for mucolysis/respiratory distress      albuterol (PROAIR HFA/PROVENTIL HFA/VENTOLIN HFA) 108 (90 Base) MCG/ACT inhaler Inhale 2 puffs into the lungs every 6 hours as needed for shortness of breath, wheezing or cough      Azelastine HCl 137 MCG/SPRAY SOLN Spray 1 spray into both nostrils 2 times daily as needed for rhinitis      benzonatate (TESSALON PERLES) 100 MG capsule Take 1 capsule (100 mg) by mouth 3 times daily as needed for cough  Qty: 90 capsule, Refills: 3    Associated Diagnoses: Pulmonary emphysema, unspecified emphysema type (H)      CALCIUM-MAGNESIUM-ZINC PO Take 1 tablet by mouth daily      cetirizine (ZYRTEC) 5 MG tablet Take 1 tablet (5 mg) by mouth daily as needed for allergies    Associated Diagnoses: Seasonal allergic rhinitis, unspecified trigger      chlorhexidine (PERIDEX) 0.12 % solution [CHLORHEXIDINE (PERIDEX) 0.12 % SOLUTION] Apply 15 mL to the mouth or throat at bedtime as needed.       escitalopram (LEXAPRO) 10 MG tablet Take 1 tablet (10 mg) by mouth daily  Qty: 90 tablet, Refills: 3    Comments: Dose increase  Associated Diagnoses: Anxiety      fluticasone (FLONASE) 50 MCG/ACT nasal spray Spray 1 spray into both nostrils daily as needed for rhinitis or allergies      Fluticasone-Umeclidin-Vilanterol (TRELEGY  ELLIPTA) 200-62.5-25 MCG/INH oral inhaler Inhale 1 puff into the lungs At Bedtime      furosemide (LASIX) 80 MG tablet Take 1 tablet (80 mg) by mouth 2 times daily  Qty: 28 tablet, Refills: 0    Associated Diagnoses: Nonischemic cardiomyopathy (H)      gabapentin (NEURONTIN) 600 MG tablet Take 600 mg by mouth At Bedtime      HYDROmorphone, STANDARD CONC, (DILAUDID) 1 MG/ML oral solution Take 1 mg by mouth 3 times daily as needed for pain      ipratropium (ATROVENT HFA) 17 MCG/ACT inhaler Inhale 2 puffs into the lungs every 6 hours as needed for wheezing      ipratropium - albuterol 0.5 mg/2.5 mg/3 mL (DUONEB) 0.5-2.5 (3) MG/3ML neb solution Take 1 vial by nebulization every 6 hours as needed for shortness of breath, wheezing or cough      Melatonin 5 MG TBDP Take 15 mg by mouth At Bedtime      metoprolol succinate ER (TOPROL XL) 50 MG 24 hr tablet Take 1.5 tablets (75 mg) by mouth At Bedtime  Qty: 135 tablet, Refills: 1    Associated Diagnoses: Essential hypertension      mirabegron (MYRBETRIQ) 25 MG 24 hr tablet Take 1 tablet (25 mg) by mouth daily  Qty: 30 tablet, Refills: 0    Associated Diagnoses: Urinary frequency      multivitamin w/minerals (THERA-VIT-M) tablet Take 1 tablet by mouth daily      nystatin (MYCOSTATIN) 050617 UNIT/GM external cream Apply topically 3 times daily as needed for dry skin      OLANZapine (ZYPREXA) 5 MG tablet Take 1 tablet (5 mg) by mouth At Bedtime  Qty: 30 tablet, Refills: 3    Associated Diagnoses: Insomnia due to medical condition      prochlorperazine (COMPAZINE) 10 MG tablet Take 1 tablet (10 mg) by mouth every 6 hours as needed (Nausea/Vomiting)  Qty: 30 tablet, Refills: 5    Associated Diagnoses: Malignant neoplasm of upper lobe of right lung (H)      rivaroxaban ANTICOAGULANT (XARELTO) 15 MG TABS tablet Take 1 tablet (15 mg) by mouth At Bedtime      simvastatin (ZOCOR) 40 MG tablet Take 1 tablet (40 mg) by mouth At Bedtime  Qty: 90 tablet, Refills: 3    Associated Diagnoses:  Hypercholesterolemia      sodium chloride 0.9 % neb solution Take 3 mLs by nebulization every 3 hours as needed for wheezing      solifenacin (VESICARE) 5 MG tablet Take 1 tablet (5 mg) by mouth daily  Qty: 90 tablet, Refills: 3    Associated Diagnoses: Urinary frequency      sotorasib (LUMAKRAS) 120 MG tablet Take 8 tablets (960 mg) by mouth daily for 30 days Do not chew, crush or split tablets.  Qty: 240 tablet, Refills: 0    Associated Diagnoses: Malignant neoplasm of upper lobe of right lung (H)      spironolactone (ALDACTONE) 25 MG tablet Take 1 tablet (25 mg) by mouth daily  Qty: 30 tablet, Refills: 0    Associated Diagnoses: Heart failure with reduced ejection fraction (H)      tamoxifen (NOLVADEX) 20 MG tablet Take 1 tablet (20 mg) by mouth daily  Qty: 90 tablet, Refills: 3    Associated Diagnoses: Malignant neoplasm of central portion of left breast in female, estrogen receptor positive (H)      traMADol (ULTRAM) 50 MG tablet Take 50 mg by mouth 2 times daily as needed for severe pain      vitamin D3 (CHOLECALCIFEROL) 50 mcg (2000 units) tablet Take 50 mcg by mouth daily      zinc oxide (DESITIN) 40 % external ointment Apply topically as needed for dry skin or irritation (Twice a day to gluteal area skin irritation)  Qty: 113 g, Refills: 1    Associated Diagnoses: Intertrigo      famotidine (PEPCID) 20 MG tablet Take 1 tablet (20 mg) by mouth every other day  Qty: 90 tablet, Refills: 0    Associated Diagnoses: COPD exacerbation (H); Pulmonary emphysema, unspecified emphysema type (H)           Allergies   Allergies   Allergen Reactions    Sulfa (Sulfonamide Antibiotics) [Sulfa Antibiotics] Rash     Headaches and dizziness.    Homeopathic Drugs [External Allergen Needs Reconciliation - See Comment] Unknown     runny nose    Mold Extracts [Molds & Smuts] Unknown    Morphine (Pf) [Morphine] Unknown     hallucinate    Lisinopril Itching, Cough and Unknown     cough    Sulfacetamide Sodium [Sulfacetamide] Rash

## 2023-09-28 NOTE — PROGRESS NOTES
[facility]    Progress Note - Hospitalist Service       Date of Admission:  9/23/2023    Assessment & Plan   Irene León is a 78 year old female admitted on 9/23/2023. She has a history of CHF with reduced ejection fraction (most recently EF 20-25% on 6/22), cardiomyopathy, severe COPD with chronic home oxygen 3 L at rest and 5-6 L with exertion, CAD, paroxysmal A-fib on Xarelto, CKD stage IV, lung cancer on oral chemo and breast cancer on oral chemo who is admitted for acute hypoxic respiratory failure likely multifactorial related to COPD and CHF.     Acute on chronic hypoxic respiratory failure   Possible COPD exacerbation   Patient with history of severe COPD on home oxygen at 3 LPM. Acutely SOB at home on day of admission requiring BiPAP by EMS. VBG on arrival with pH 7.42 with CO2 of 56. Recevied duonebs x2 and solumedrol in ED, transitioned to 5L O2 via oxymask where she stabilized. Early 9/26/23 AM, her oxygen requirement decreased back down to 3 LPM. Patient has been hospitalized for respiratory failure several times over the last few months, Likely multifactorial with COPD contributing and CHF contributing as below. Patient was previously full code but was transitioned to DNR/DNI status after much discussion in ED on 9/23/23. Hospice/palliative team has been consulted and will be involved.  - Continue PTA inhalers, mucomyst, tessalon perles  - Duonebs and Mucomyst PRN   - S/p solumedrol in ER then transitioned to prednisone 40mg   - Started on doxycycline and ceftriaxone in the ED   -Discontinued doxycycline (9/23-9/28)   -Low suspicion for infectious etiology so will discontinue ceftriaxone (9/23-9/24) with low threshold to resume if patient clinical status acutely worsening  - Respiratory panel and sputum cultures pending collection as able  - Strict I&O's and daily weights  - Continuous pulse ox   - Supplemental O2 as needed to maintain oxygen saturations between  88%-92%, RRT is on board to manage further  - Hospice/palliative consulted and following, appreciate recommendations     CHF with reduced EF 20%-25%  Cardiomyopathy  Possible CHF exacerbation   Recent hospitalization for respiratory failure 2/2 CHF. BNP on admission ~6,000 (noted to be 9,600 on recent admission 9/15/23). Troponin elevated on admission to 43 but was 44 two days prior so no significant change. CHF likely contributing to respiratory failure. Switched from Lasix to Bumex on 9/24/23 per Cardiology.  - Cardiology consulted and following, appreciate recommendations   -Continue bumetanide 1 mg twice daily  -Continue on rivaroxaban for anticoagulation and statin therapy  -Consider outpatient appointment with EP to discuss CRT device.    - Strict I&O's and daily weights   - Continuous pulse ox   - Supplemental O2 as needed to maintain oxygen saturations 88-92% though as above, RRT is on board for further management  - Cardiac tele resumed 9/25/23 in light of possible CHF exacerbation and addition of possible QTC prolonging medication     CKD stage 4  Cr 1.73 on admission, GFR 30, BUN 24.3. 9/25/23 creatinine 1.59, GFR 33, and BUN 27.5. This continues to be stable.  - AM BMP      Lower extremity wounds  - WOC consult placed 9/23/23, appreciate recommendations     Right food swelling  9/23/23 right foot XR showing that the right foot is negative for fracture, there is significant soft tissue swelling along the dorsal aspect of the foot, cellulitis could also have this appearance. Area of swelling is bruised and raised, appears to be hematoma but will continue to monitor daily for any changes. Tenderness to palpation but no surrounding erythema or warmth, and no fevers.   - Continues to improve on exam and per patient    Insomnia  Patient reporting minimal/no sleep despite melatonin 10 mg. Would be interested in additional medication for sleep/anxiety.   -PRN hydroxyzine 25-50 mg daily   -Patient has history of  QTC prolongation so will monitor closely with cardiac tele as above which was also started due to possible CHF exacerbation    Leukocytosis   Leukocytosis in the setting of steroid use. Patient clinically improving and feeling well, so suspect leukocytosis is secondary to steroid use but will continue to monitor.  - Trend CBC     Chronic conditions  Paroxysmal A. Fib: continue PTA DOAC (Xarelto) and PTA metoprolol   Lung cancer: follows with Dr. Suarez at [facility] [place], currently oral chemo medication is on hold due to side effects   Breast cancer: follows with Dr. Suarez at [facility] [place], continue daily PTA tamoxifen        Diet: Combination Diet Regular Diet Adult; 2 gm NA Diet; Low Saturated Fat Diet  Snacks/Supplements Adult: Ensure Enlive; Between Meals  Diet    DVT Prophylaxis: PTA DOAC  Hayes Catheter: Not present  Fluids: None  Lines: None     Cardiac Monitoring: None  Code Status: No CPR- Do NOT Intubate      Clinically Significant Risk Factors                      # Hypertension: Noted on problem list    # Acute heart failure with reduced ejection fraction: last echo with EF <40% and receiving IV diuretics           # COPD: noted on problem list          Disposition Plan      Expected Discharge Date: 09/29/2023, 12:00 PM      Discharge Comments: Palliative consult, O2, PT/OT consult        The patient's care was discussed with the Attending Physician, Dr. Quinonez .    JOSE MIGUEL LAMAS MD  Hospitalist Service  [facility]  Securely message with [employer] (more info)  Text page via [facility] Paging/Directory   ______________________________________________________________________    Interval History   No acute events overnight.  Patient reports feeling well with no new SOB or chest pain. She has been able to ambulate to the bathroom with assist though she does get SOB with ambulation.     Physical Exam   Vital Signs: Temp: 98.4  F (36.9  C) Temp src: Oral BP: 135/77 Pulse: 86   Resp: 20 SpO2: 95 % O2 Device:  Nasal cannula Oxygen Delivery: 5 LPM (turning down to 4 L)  Weight: 107 lbs 1.6 oz    Constitutional: awake, alert, comfortable-appearing, laying in bed, no acute distress  HEENT: Lids and lashes normal, sclera clear, conjunctiva normal. Normocephalic and atraumatic. No rhinorrhea. Moist mucus membranes.   Respiratory: Patient breathing comfortably on 3L O2 via oxymask, no increased work of breathing or respiratory distress.  Cardiovascular: Regular rate and rhythm, skin is warm and well-perfused.  Skin: Bruising throughout extremities. Several chronic-appearing scabs/lesions and dry skin throughout bilateral lower extremities. Raised, bruised area on dorsal aspect of patient's right foot that is tender to palpation but has no surrounding erythema or warmth. Swelling is improved.  Musculoskeletal: Trace pitting edema of bilateral lower extremities.  Neurologic: Awake, alert, oriented to name, place and time. No apparent focal deficits.    Data     I have personally reviewed the following data over the past 24 hrs:    15.9 (H)  \   10.8 (L)   / 227     144 106 31.1 (H) /  92   3.9 29 1.53 (H) \       Imaging results reviewed over the past 24 hrs:   Recent Results (from the past 24 hour(s))   XR Chest Port 1 View    Narrative    EXAM: XR CHEST PORT 1 VIEW  LOCATION: [facility]  DATE: 9/28/2023    INDICATION: right side chest pain  COMPARISON: 09/23/2023 and multiple prior studies, PET CT 08/11/2023 and older exams.      Impression    IMPRESSION: No interval change given shallower inspiration. The extensive emphysema is better appreciated on the CT. Known, inferior right upper lobe pulmonary nodule again noted and overlies the right posterior seventh rib. Bandlike fibroatelectasis in   the left midlung and mild bibasilar fibrosis unchanged. No signs of acute pneumonia or failure. Heart and pulmonary vascularity are normal.   CT Chest Pulmonary Embolism w Contrast    Narrative    EXAM: CT CHEST  PULMONARY EMBOLISM W CONTRAST  LOCATION: [facility]  DATE: 9/28/2023    INDICATION: Chest pain, dyspnea  COMPARISON: PET/CT from 08/11/2023  TECHNIQUE: CT chest pulmonary angiogram during arterial phase injection of IV contrast. Multiplanar reformats and MIP reconstructions were performed. Dose reduction techniques were used.   CONTRAST: 75ml Isovue 370    FINDINGS:  ANGIOGRAM CHEST: There is mixing artifact in the right lower lobe pulmonary artery on series 7 image 145. Pulmonary arteries are normal caliber and negative for pulmonary emboli. Thoracic aorta is negative for dissection.     LUNGS AND PLEURA: Slight interval decrease in the size of the peripheral right upper lobe pulmonary nodule measuring 1.9 x 1.9 cm compared to 2.3 x 2.1 cm previously (series 8 image 116). However, there is increased size of the 2 satellite nodules. For   example on series 8 image 131 measuring 0.8 x 0.6 mm compared to 0.6 x 0.4 cm. There is a satellite nodule just superior to this which is new or more conspicuous compared to prior exam measuring 1.0 x 0.9 cm (series 8 image 123). The satellite nodule   superior to the dominant right lobe nodule on series 8 image 91 is similar measuring 1.2 x 0.6 cm.There is a new satellite nodule in the right upper lobe on series 8 image 92 measuring 0.7 x 0.5 cm.    Advanced emphysema. Scattered areas of atelectasis and scarring. Similar pleural thickening and architectural distortion in the anterior left upper lobe/left lingula on series 8 image 118. Moderate to large volume airway secretions particularly in the   right lung and right lower lobe with bronchial wall thickening, mucous plugging, with postobstructive consolidation and groundglass opacities. Mild bronchial wall thickening.    MEDIASTINUM/AXILLAE: Mild cardiomegaly. Mild reflux of contrast in the IVC and proximal hepatic veins. Persistent enlarged external adenopathy. A few of these are mildly increased in  size compared to prior exam. The largest is in the lower paratracheal   chain measuring 2.1 x 1.5 cm on series 7 image 86 (previously 1.7 x 1.3 cm).    CORONARY ARTERY CALCIFICATION: Mild.    UPPER ABDOMEN: Ectasia of the infrarenal abdominal aorta measuring up to 2.8 cm. Extensive atherosclerosis.    MUSCULOSKELETAL: Degenerative changes of the spine. Chronic severe T12 compression deformity with associated focal kyphosis. No suspicious osseous lesions.      Impression    IMPRESSION:  1.  No pulmonary embolus.  2.  New manifestations of aspiration pneumonitis/pneumonia in the right lower lobe with moderate to large volume airway secretions.  3.  Slightly decreased size of the dominant nodule within the right upper lobe.  4.  However, there are a few new or increasing satellite nodules in the right lung. Additionally, interval increase in the size of several of the mediastinal lymph nodes. Continued attention on follow-up.  5.  Advanced emphysema.

## 2023-09-28 NOTE — PROGRESS NOTES
Occupational Therapy Discharge Summary    Reason for therapy discharge:    Discharged to transitional care facility.    Progress towards therapy goal(s). See goals on Care Plan in [facility] electronic health record for goal details.  Goals partially met.  Barriers to achieving goals:   discharge from facility.    Therapy recommendation(s):    Continued therapy is recommended.  Rationale/Recommendations:  To increase indep with ADLs and trsfs.  Monitor vitals with activity.

## 2023-09-28 NOTE — PROGRESS NOTES
Pt developed acute right sided chest pain prior to discharge. Pain is sharp and stabbing. Localized to below right breast. Reproducible with palpation. Not radiating. Not short of breath. No diaphoresis. Appears in pain, but not unwell. First hs troponin is 17, significantly lower than on 9/23. CT PE protocol negative for PE but notable for new aspiration pneumonia.  Pain does not sound cardiac by description.  Repeat troponin x1 if no significant rise then no further cardiology recommendations.

## 2023-09-28 NOTE — PROGRESS NOTES
[facility]   [address]  [place] [address]   [email or web address]   OFFICE: [phone]     CARDIOLOGY FOLLOW-UP NOTE      Impression and Plan     Impression:   Acute hypoxic respiratory failure, likely related to acute COPD exacerbation and mild decompensated heart failure  Acute on chronic heart failure with reduced LVEF  Non-ischemic cardiomyopathy with LVEF 20-25% and longstanding LBBB  Non-obstructive coronary artery disease  Stage IIIb chronic kidney disease  Severe centrilobular emphysema    Plan:  Continue oral bumetanide. This can replace his home furosemide  No further cardiology recommendations. Will sign off. Please contact me with additional questions or concerns.  Follow up with cardiology LENA in 2 weeks and with Dr Chilel in 2 months.    Primary Cardiologist: Dr. Gaby Chilel    Subjective     Feeling back to baseline. O2 at 3L at rest. No chest pain. No LE swelling.    Cardiac Diagnostics     Echocardiogram (results reviewed):   TTE 6/22/23  The left ventricle is normal in size with mild concentric left ventricular hypertrophy.  Left ventricular function is decreased. The ejection fraction is 20-25% (severely reduced). There is moderate global hypokinesia of the left ventricle.  Grade III or advanced diastolic dysfunction.  Moderately decreased right ventricular systolic function  The left atrium is mildly dilated.  No hemodynamically significant valvular abnormalities on 2D or color flow imaging.  Compared to the prior study of 4/8/22 there has been a decline in left ventricular systolic function.        Physical Examination       BP (!) 157/70 (BP Location: Left arm, Patient Position: Chair, Cuff Size: Adult Small)   Pulse 69   Temp 98.4  F (36.9  C) (Oral)   Resp 18   Wt 48.6 kg (107 lb 1.6 oz)   SpO2 91%   BMI 21.59 kg/m                Intake/Output Summary (Last 24 hours) at 9/28/2023 1001  Last data filed at 9/28/2023 0900  Gross per 24  hour   Intake 480 ml   Output 500 ml   Net -20 ml       General: pleasant female. No acute distress.   HENT: external ears normal. Nares patent. Mucous membranes moist.  Eyes: perrla, extraocular muscles intact. No scleral icterus.   Neck: No JVD  Lungs: diminished air entry, but otherwise clear to auscultation  COR: regular rate and rhythm, No murmurs, rubs, or gallops  Abd: nondistended, BS present  Extrem: No edema         Imaging      No new non-cardiac imaging.    Lab Results   Lab Results   Component Value Date    BUN 31.1 (H) 09/28/2023     09/28/2023    CO2 29 09/28/2023       Lab Results   Component Value Date    WBC 15.9 (H) 09/28/2023    HGB 10.8 (L) 09/28/2023    HCT 35.7 09/28/2023    MCV 95 09/28/2023     09/28/2023       Lab Results   Component Value Date    TROPONINI 0.07 08/27/2023     (H) 08/27/2023    INR 1.05 08/27/2023               Current Inpatient Scheduled Medications   Scheduled Meds:   bumetanide  1 mg Oral BID    escitalopram  10 mg Oral Daily    famotidine  20 mg Oral Every Other Day    fluticasone-vilanterol  1 puff Inhalation At Bedtime    And    umeclidinium  1 puff Inhalation At Bedtime    gabapentin  600 mg Oral At Bedtime    metoprolol succinate ER  50 mg Oral At Bedtime    mirabegron  25 mg Oral Daily    OLANZapine  5 mg Oral At Bedtime    predniSONE  40 mg Oral Daily    rivaroxaban ANTICOAGULANT  15 mg Oral At Bedtime    spironolactone  25 mg Oral Daily    tamoxifen  20 mg Oral Daily    tolterodine ER  2 mg Oral Daily     Continuous Infusions:   - MEDICATION INSTRUCTIONS -              Medications Prior to Admission   Prior to Admission medications    Medication Sig Start Date End Date Taking? Authorizing Provider   acetaminophen (TYLENOL) 500 MG tablet Take 1,000 mg by mouth every 8 hours as needed for mild pain or fever  3/31/21  Yes Provider, Historical   acetylcysteine (MUCOMYST) 10 % nebulizer solution Inhale 4 mLs into the lungs every 4 hours as needed  for mucolysis/respiratory distress   Yes Unknown, Entered By History   albuterol (PROAIR HFA/PROVENTIL HFA/VENTOLIN HFA) 108 (90 Base) MCG/ACT inhaler Inhale 2 puffs into the lungs every 6 hours as needed for shortness of breath, wheezing or cough   Yes Unknown, Entered By History   Azelastine HCl 137 MCG/SPRAY SOLN Spray 1 spray into both nostrils 2 times daily as needed for rhinitis   Yes Unknown, Entered By History   benzonatate (TESSALON PERLES) 100 MG capsule Take 1 capsule (100 mg) by mouth 3 times daily as needed for cough 6/29/23  Yes Tobi Miguel MD   CALCIUM-MAGNESIUM-ZINC PO Take 1 tablet by mouth daily   Yes Unknown, Entered By History   cetirizine (ZYRTEC) 5 MG tablet Take 1 tablet (5 mg) by mouth daily as needed for allergies 9/19/23  Yes Dereje Isaac MD   chlorhexidine (PERIDEX) 0.12 % solution [CHLORHEXIDINE (PERIDEX) 0.12 % SOLUTION] Apply 15 mL to the mouth or throat at bedtime as needed.  3/15/17  Yes Provider, Historical   escitalopram (LEXAPRO) 10 MG tablet Take 1 tablet (10 mg) by mouth daily 8/2/23  Yes Pankaj Montanez MD   fluticasone (FLONASE) 50 MCG/ACT nasal spray Spray 1 spray into both nostrils daily as needed for rhinitis or allergies   Yes Unknown, Entered By History   Fluticasone-Umeclidin-Vilanterol (TRELEGY ELLIPTA) 200-62.5-25 MCG/INH oral inhaler Inhale 1 puff into the lungs At Bedtime   Yes Unknown, Entered By History   furosemide (LASIX) 80 MG tablet Take 1 tablet (80 mg) by mouth 2 times daily 9/19/23  Yes Dereje Isaac MD   gabapentin (NEURONTIN) 600 MG tablet Take 600 mg by mouth At Bedtime   Yes Unknown, Entered By History   HYDROmorphone, STANDARD CONC, (DILAUDID) 1 MG/ML oral solution Take 1 mg by mouth 3 times daily as needed for pain   Yes Unknown, Entered By History   ipratropium (ATROVENT HFA) 17 MCG/ACT inhaler Inhale 2 puffs into the lungs every 6 hours as needed for wheezing   Yes Unknown, Entered By History   ipratropium - albuterol 0.5 mg/2.5 mg/3 mL  (DUONEB) 0.5-2.5 (3) MG/3ML neb solution Take 1 vial by nebulization every 6 hours as needed for shortness of breath, wheezing or cough   Yes Unknown, Entered By History   Melatonin 5 MG TBDP Take 15 mg by mouth At Bedtime   Yes Unknown, Entered By History   metoprolol succinate ER (TOPROL XL) 50 MG 24 hr tablet Take 1.5 tablets (75 mg) by mouth At Bedtime 6/2/23  Yes Pankaj Montanez MD   mirabegron (MYRBETRIQ) 25 MG 24 hr tablet Take 1 tablet (25 mg) by mouth daily 9/19/23  Yes Dereje Isaac MD   multivitamin w/minerals (THERA-VIT-M) tablet Take 1 tablet by mouth daily   Yes Reported, Patient   nystatin (MYCOSTATIN) 533925 UNIT/GM external cream Apply topically 3 times daily as needed for dry skin   Yes Unknown, Entered By History   OLANZapine (ZYPREXA) 5 MG tablet Take 1 tablet (5 mg) by mouth At Bedtime 8/15/23  Yes Tobi Miguel MD   prochlorperazine (COMPAZINE) 10 MG tablet Take 1 tablet (10 mg) by mouth every 6 hours as needed (Nausea/Vomiting) 8/31/23  Yes Devon Suarez MD   rivaroxaban ANTICOAGULANT (XARELTO) 15 MG TABS tablet Take 1 tablet (15 mg) by mouth At Bedtime   Yes Unknown, Entered By History   simvastatin (ZOCOR) 40 MG tablet Take 1 tablet (40 mg) by mouth At Bedtime 6/1/23  Yes Pankaj Montanez MD   sodium chloride 0.9 % neb solution Take 3 mLs by nebulization every 3 hours as needed for wheezing   Yes Unknown, Entered By History   solifenacin (VESICARE) 5 MG tablet Take 1 tablet (5 mg) by mouth daily 7/12/23  Yes Pankaj Montanez MD   sotorasib (LUMAKRAS) 120 MG tablet Take 8 tablets (960 mg) by mouth daily for 30 days Do not chew, crush or split tablets. 9/1/23 10/1/23 Yes Devon Suarez MD   spironolactone (ALDACTONE) 25 MG tablet Take 1 tablet (25 mg) by mouth daily 9/19/23  Yes Dereje Isaac MD   tamoxifen (NOLVADEX) 20 MG tablet Take 1 tablet (20 mg) by mouth daily 6/30/23  Yes Devon Suarez MD   traMADol (ULTRAM) 50 MG tablet Take 50 mg by mouth 2 times daily as needed for  severe pain   Yes Unknown, Entered By History   vitamin D3 (CHOLECALCIFEROL) 50 mcg (2000 units) tablet Take 50 mcg by mouth daily   Yes Unknown, Entered By History   zinc oxide (DESITIN) 40 % external ointment Apply topically as needed for dry skin or irritation (Twice a day to gluteal area skin irritation) 7/12/23  Yes Pankaj Montanez MD   famotidine (PEPCID) 20 MG tablet Take 1 tablet (20 mg) by mouth every other day 9/19/23   Dereje Isaac MD

## 2023-09-28 NOTE — PROGRESS NOTES
Care Management Discharge Note    Discharge Date: 09/28/2023       Discharge Disposition:  [facility]    Discharge Services:  none    Discharge DME:  (TBD- currently on home 02 supplied by [email or web address] and has portable)    Discharge Transportation: anthony Mckoy at 1pm    Private pay costs discussed: Not applicable      PAS Confirmation Code: [id number]  Patient/family educated on Medicare website which has current facility and service quality ratings:  yes    Education Provided on the Discharge Plan:  (AVS per bedside RN)  Persons Notified of Discharge Plans: pt, daughter, TCU  Patient/Family in Agreement with the Plan:  yes    Handoff Referral Completed: Yes    Additional Information:  9:53 AM  Pt medically ready to discharge to [facility] via daughter Leelee at 1pm.  [facility] to fax discharge orders.  RODOLFO updated Floridalma in admissions at TCU around discharge plan.  PAS done.  No further discharge needs.    1:31 PM  RODOLFO informed pt is not medically ready to discharge today.  RODOLFO updated [facility] T[facility].  Anticipate pt will be ready for discharge tomorrow.  Discharge orders will need to be updated per TCU request (PT/OT orders need to be added, no liquid dilaudid (tabs only), Sotorasib needs to be on hold during TCU stay).  SW to update provider.    PATRIC Pierson

## 2023-09-28 NOTE — PROGRESS NOTES
Significant Event Progress Note    Date: 09/28/23     Assessment/Plan:  Irene León was admitted on 9/23/2023. She has a history of CHF with reduced ejection fraction (most recently EF 20-25% on 6/22/23), cardiomyopathy, severe COPD with chronic home oxygen 3 L at rest and 5-6 L with exertion, CAD, paroxysmal A-fib on Xarelto, CKD stage IV, lung cancer on oral chemo and breast cancer on oral chemo who is admitted for acute hypoxic respiratory failure likely multifactorial in the setting of COPD and CHF.     Interval Event:  At approximately 1250, resident team was paged to evaluate for new onset chest pain. Patient reported acute-onset severe chest pain near the rib cage under the right breast. The pain is constant and radiating around to the back. EKG, CXR were obtained and unremarkable. Pain was not improved by nitroglycerin or aspirin administration. It was also not improved by oxycodone. Patient reported diaphoresis and nausea as well, the nausea responded somewhat to Zofran but patient still had emesis x2 episodes; emesis was liquid, bile-colored.    IV access had been removed since patient was due to discharge to TCU at 1300. Given acute change in clinical status, IV access was re-established. IV Zofran and IV Dilaudid were administered, and patient was sent for CT-PE to evaluate for possible clot. The patient is on chronic anticoagulation with Xarelto, though she has not received a dose yet today.    Initial Assessment:  Temp:  [97.5  F (36.4  C)-98.4  F (36.9  C)] 98.4  F (36.9  C)  Pulse:  [] 55  Resp:  [18-26] 20  BP: (112-198)/(55-97) 198/85  SpO2:  [90 %-95 %] 95 %    General: Uncomfortable appearing and in acute distress, diaphoretic, alert and responding to questions appropriately  Cardiovascular: Distant heart sounds. Strong peripheral pulses.  Respiratory: Lung sounds present bilaterally. Breathing well on 5 LPM; her hospital O2 baseline has been 3-5 LPM.  Chest: Pain is reproducible when  palpating right costosternal area; patient is unsure if pain is only at the bony site or if it is underneath the bony site.  Extremities: Trace edema but acutely changed from previous.  Skin: LE extremity wounds present but not acutely changed from previous.    Interventions:  -ECG, CXR obtained at bedside  -CTPE pending  -troponin pending  -Patient received nitroglycerin x2, aspirin, PO Zofran, hydroxyzine, PO oxycodone, IV Zofran, IV Dilaudid  -Resumed cardiac tele    Pertinent Labs and Results:  CXR:  IMPRESSION: No interval change given shallower inspiration. The extensive emphysema is better appreciated on the CT. Known, inferior right upper lobe pulmonary nodule again noted and overlies the right posterior seventh rib. Bandlike fibroatelectasis in   the left midlung and mild bibasilar fibrosis unchanged. No signs of acute pneumonia or failure. Heart and pulmonary vascularity are normal.    ECG:  Sinus rhythm with occasional Premature ventricular complexes  Possible Left atrial enlargement  Left axis deviation  Non-specific intra-ventricular conduction delay  Nonspecific ST and T wave abnormality  Abnormal ECG  When compared with ECG of 23-SEP-2023 16:03,  Premature ventricular complexes are now Present  Premature supraventricular complexes are no longer Present  Nonspecific T wave abnormality no longer evident in Inferior leads  T wave inversion less evident in Lateral leads       This patient was discussed with the attending physician, Dr. Quinonez.    JOSE MIGUEL LAMAS MD PGY-1 9/28/2023  [facility] Residency Program

## 2023-09-29 NOTE — PLAN OF CARE
Problem: Plan of Care - These are the overarching goals to be used throughout the patient stay.    Goal: Optimal Comfort and Wellbeing  Outcome: Progressing     Problem: Risk for Delirium  Goal: Improved Sleep  Outcome: Progressing     Problem: COPD (Chronic Obstructive Pulmonary Disease)  Goal: Optimal Level of Functional [place]  Outcome: Progressing  Intervention: Optimize Functional Ability    Goal: Effective Oxygenation and Ventilation  Outcome: Progressing  Intervention: Promote Airway Secretion Clearance    Intervention: Optimize Oxygenation and Ventilation     Goal Outcome Evaluation:    Hypertensive on 4L. A&ox4. Pt drowsy/lightly sedated through most of shift. Held scheduled dilaudid q 2 hour x2 during shift per RASS assessment. Pt denied pain on third assessment and was drowsy, therefore, dilaudid held again. Last dose of dilaudid given at 0600 along with scheduled toradol & tylenol. Tele NS w/ Ariane. Ax1 to bathroom, alexandru overnight. Denies chest pain. Plan is for TCU transfer later today.

## 2023-09-29 NOTE — PROGRESS NOTES
"[facility] ACUTE PAIN SERVICE CONSULTATION     Date of Admission:  9/23/2023  Date of Consult (When I saw the patient): 09/29/23  Physician requesting consult: Maria C Starr MD  Reason for consult: acute onset chest/abdominal pain, etiology unclear, patient reporting 10/10 pain for last 24 hours  Primary Care Physician: Pankaj Montanez MD      Assessment/Plan:     Irene León is a 78 year old female who was admitted on 9/23/2023.  Pain team was asked to see the patient for acute onset severe pain - unclear etiology. History of CHF with reduced ejection fraction (most recently EF 20-25% on 6/22), cardiomyopathy, severe COPD with chronic home oxygen 3 L at rest and 5-6 L with exertion, CAD, paroxysmal A-fib on Xarelto, CKD stage IV, lung cancer on oral chemo and breast cancer on oral chemo who is admitted for acute hypoxic respiratory failure. She is followed in outpatient palliative care clinic, last seen on 8/15/23. The patient is a former smoker and denies chemical dependency history.     Opioid Induced Respiratory Depression Risk Assessment: HIGH due to the following risk factors: COPD on home O2, CHF, renal dysfunction, Age>60, concomitant CNS depressants, opioid naive status    Pt on 4L of O2 via NC to maintain O2 sats.     Discharge summary signed yesterday. However at 1250 new onset chest pain. Patient reported acute-onset severe chest pain near the rib cage under the right breast. The pain is constant and radiating around to the back. EKG, CXR were obtained and unremarkable. Pain was not improved by nitroglycerin or aspirin administration. It was also not improved by oxycodone. Patient reported diaphoresis and nausea as well, the nausea responded somewhat to Zofran but patient still had emesis x2 episodes; emesis was liquid, bile-colored. Per cardiology, \"CT PE protocol negative for PE but notable for new aspiration pneumonia.Pain does not sound cardiac by description.\"    US abdomen " 9/28 - Right upper quadrant mass (possibly adrenal in origin), suspicious for metastatic disease. Recommend contrast enhanced CT abdomen - pelvis.     Pt headed to CT after assessment, pain 7/10 during assessment, under right breast, shoots to back, denies muscle spasms, localized, improved with IV dilaudid, tramadol no effectiveness, see reccs below.     PLAN:   1) Pain is consistent with unknown etiology - acute/sever. Cardiology consulted and signed off yesterday- with recommendation to continue bumex.   2)Multimodal Medication Therapy  Topical: add lidocaine patch x 2 daily   NSAID'S: none d/t crcl = 23.1 ml/min and age and on anticoagulation and steroids and CHF - discontinue toradol 15 mg IV q6h prn   Steroids: prednisone 20 mg po daily   Muscle Relaxants: none warranted  Adjuvants: gabapentin 600 mg po at bedtime - caution can worsen CHF/peripheral edema - required bumex during admission and right foot swelling present during admission - decrease to 300 mg - dose dependent taper to lowest most effective dose, APAP prn - schedule 975 mg po TID, hydroxyzine 50 mg po TID prn,   Antidepressants/anxiolytics: home lexapro 10 mg po daily, zyprexa 5 mg po at bedtime,   Opioids: tramadol 50 mg po BID prn- rotate to dilaudid 1-2 mg po q4h prn first line  IV Pain medication: dilaudid 0.2 mg IV q2h prn - decrease to qid prn second line  3)Non-medication interventions: per nursing  Acupuncture consult - if agreeable  Integrative consult - if agreeable  4)Constipation Prophylaxis: add senna/doc x 1 po BID, miralax prn, bisacodyl prn  5) Care Teams: HMS, cardiology, pain    -MN  pulled from system on 9/29/23. This indicates last filled tramadol 50 mg #60 (30 day supply) on 8/25 from Tobi Miguel, and gabapentin 600 mg on 6/25 #30 (30 day supply) from Jacob Campa MD.   Discharge Recommendations - We recommend prescribing the following at the time of discharge: TBD     History of Present Illness (HPI):       Irene TOWNSEND  Mau is a 78 year old female with past medical history as above.     Per [facility] review, the patient does not have an opioid tolerance.     Reviewed medical record, labs, imaging, ED note, and care everywhere.     Past pain treatments have included: butrans patch 15 mcg/hr last filled 5/3 #4 (28 day supply) - was on this from 3/2023 - 6/2023.       Home pain medications/psych medications/anticoagulation medications include: APAP 1g po q8h prn, lexapro 10 mg po daily - filled 8/3 #90 (90 day supply), gabapentin 600 mg po at bedtime - this was filled 6/25 #30 (30 day supply) - not current rx, dilaudid solution 1 mg TID prn - filled 5/18 #90 mL (30 day supply)  - not a current rx, tramadol 50 mg po BID prn - filled 8/24 #60 (30 day supply) - recent rx      Imaging:   US Abdomen Limited*    Result Date: 9/28/2023  EXAM: US ABDOMEN LIMITED LOCATION: [facility] DATE: 9/28/2023 INDICATION: Acute onset right upper quadrant pain radiating to the back. COMPARISON: PET/CT 08/11/2023. TECHNIQUE: Limited abdominal ultrasound. FINDINGS: GALLBLADDER: No gallstones, wall thickening, or pericholecystic fluid. Negative sonographic Álvarez's sign. BILE DUCTS: No biliary dilatation. The common duct measures 2 mm. LIVER: Normal parenchyma with smooth contour. No focal mass. RIGHT KIDNEY: No hydronephrosis. PANCREAS: The visualized portions are normal. No ascites. Incompletely seen right upper quadrant mass, measuring at least 5.5 cm.     IMPRESSION: 1.  Right upper quadrant mass (possibly adrenal in origin), suspicious for metastatic disease. Recommend contrast enhanced CT abdomen - pelvis. 2.  No cholelithiasis. Gallbladder wall thickness is exaggerated by partial distention of the gallbladder. Accounting for this, no definitive evidence for acute cholecystitis. 3.  Remaining exam unremarkable.     CT Chest Pulmonary Embolism w Contrast    Result Date: 9/28/2023  EXAM: CT CHEST PULMONARY EMBOLISM W CONTRAST  LOCATION: [facility] DATE: 9/28/2023 INDICATION: Chest pain, dyspnea COMPARISON: PET/CT from 08/11/2023 TECHNIQUE: CT chest pulmonary angiogram during arterial phase injection of IV contrast. Multiplanar reformats and MIP reconstructions were performed. Dose reduction techniques were used. CONTRAST: 75ml Isovue 370 FINDINGS: ANGIOGRAM CHEST: There is mixing artifact in the right lower lobe pulmonary artery on series 7 image 145. Pulmonary arteries are normal caliber and negative for pulmonary emboli. Thoracic aorta is negative for dissection. LUNGS AND PLEURA: Slight interval decrease in the size of the peripheral right upper lobe pulmonary nodule measuring 1.9 x 1.9 cm compared to 2.3 x 2.1 cm previously (series 8 image 116). However, there is increased size of the 2 satellite nodules. For example on series 8 image 131 measuring 0.8 x 0.6 mm compared to 0.6 x 0.4 cm. There is a satellite nodule just superior to this which is new or more conspicuous compared to prior exam measuring 1.0 x 0.9 cm (series 8 image 123). The satellite nodule superior to the dominant right lobe nodule on series 8 image 91 is similar measuring 1.2 x 0.6 cm.There is a new satellite nodule in the right upper lobe on series 8 image 92 measuring 0.7 x 0.5 cm. Advanced emphysema. Scattered areas of atelectasis and scarring. Similar pleural thickening and architectural distortion in the anterior left upper lobe/left lingula on series 8 image 118. Moderate to large volume airway secretions particularly in the right lung and right lower lobe with bronchial wall thickening, mucous plugging, with postobstructive consolidation and groundglass opacities. Mild bronchial wall thickening. MEDIASTINUM/AXILLAE: Mild cardiomegaly. Mild reflux of contrast in the IVC and proximal hepatic veins. Persistent enlarged external adenopathy. A few of these are mildly increased in size compared to prior exam. The largest is in the lower  paratracheal chain measuring 2.1 x 1.5 cm on series 7 image 86 (previously 1.7 x 1.3 cm). CORONARY ARTERY CALCIFICATION: Mild. UPPER ABDOMEN: Ectasia of the infrarenal abdominal aorta measuring up to 2.8 cm. Extensive atherosclerosis. MUSCULOSKELETAL: Degenerative changes of the spine. Chronic severe T12 compression deformity with associated focal kyphosis. No suspicious osseous lesions.     IMPRESSION: 1.  No pulmonary embolus. 2.  New manifestations of aspiration pneumonitis/pneumonia in the right lower lobe with moderate to large volume airway secretions. 3.  Slightly decreased size of the dominant nodule within the right upper lobe. 4.  However, there are a few new or increasing satellite nodules in the right lung. Additionally, interval increase in the size of several of the mediastinal lymph nodes. Continued attention on follow-up. 5.  Advanced emphysema.    XR Chest Port 1 View    Result Date: 9/28/2023  EXAM: XR CHEST PORT 1 VIEW LOCATION: [facility] DATE: 9/28/2023 INDICATION: right side chest pain COMPARISON: 09/23/2023 and multiple prior studies, PET CT 08/11/2023 and older exams.     IMPRESSION: No interval change given shallower inspiration. The extensive emphysema is better appreciated on the CT. Known, inferior right upper lobe pulmonary nodule again noted and overlies the right posterior seventh rib. Bandlike fibroatelectasis in the left midlung and mild bibasilar fibrosis unchanged. No signs of acute pneumonia or failure. Heart and pulmonary vascularity are normal.    XR Foot Port Right 2 Views    Result Date: 9/23/2023  EXAM: XR FOOT PORT RIGHT 2 VIEWS LOCATION: [facility] DATE: 9/23/2023 INDICATION: pain, bruisng, swelling over 5th metatarsal COMPARISON: None.     IMPRESSION: The right foot is negative for fracture. There is significant soft tissue swelling along the dorsal aspect of the foot. Cellulitis could also have this appearance.    XR  Chest Port 1 View    Result Date: 9/23/2023  EXAM: XR CHEST PORT 1 VIEW LOCATION: [facility] DATE: 9/23/2023 INDICATION: shortness of breath, COPD vs CHF exacerbation COMPARISON: 09/15/2023     IMPRESSION: Heart size is stable. Right midlung nodule measuring 2.5 cm, unchanged. Decrease in linear parenchymal opacity in the left midlung. Chronic interstitial scarring/fibrosis bilaterally. There is some increasing pleural thickening in the left upper lobe. No pneumothorax. Old right rib fracture.    XR Chest Port 1 View    Result Date: 9/15/2023  EXAM: XR CHEST PORT 1 VIEW LOCATION: [facility] DATE: 9/15/2023 INDICATION: dyspnea COMPARISON: 08/28/2023     IMPRESSION: Since 08/28/2023 there has been increase in the patchy infiltrate in the left midlung worrisome for pneumonitis. The chest is otherwise stable to include findings of bibasilar atelectasis and area of nodularity projected over the right midlung laterally.      Melania Garcia, PharmD, BCPS  Acute Care Pain Management Program [facility]   Page via online paging system or MyFrontSteps

## 2023-09-29 NOTE — PLAN OF CARE
Goal Outcome Evaluation:         Problem: Plan of Care - These are the overarching goals to be used throughout the patient stay.    Goal: Optimal Comfort and Wellbeing  Outcome: Progressing   Pt alert and oriented. VSS, occasional hypertension. Pt currently on 4L O2 via NC. Appetite fair. Up with assist of 1, gaitbelt and walker. Pain persistent Right chest and back controlled with prn pain meds. Cont to monitor. Hematology/Oncology consult pending.

## 2023-09-29 NOTE — SIGNIFICANT EVENT
"Approx 10 minutes after she had had 2 puffs of Albuterol inhaler for slowly increasing dyspnea after ambulating with Physical Therapy, at 12:40, as pt was preparing to be picked up at 13:00 by dtr, as pt was talking with me, she had a severe, sudden pain in right lowest ribs, writhing in her chair in pain, sudden tachypnea. BFM was stat paged, Respiratory Therapy stat paged. BFM resident and Dr Diaz were in immediately. EKG obtained, pt had emesis, meds given for pain, nausea, anxiety, with no alleviating of symptoms. CXR obtained, new IV was placed and labs drawn, then she was taken to CT for PE run. Pain continued apparently unabated for several more hours, giving all meds available. BP highest 227/102, IV Hydralazine given, now 175/77. Tele monitoring was initiated around 13:50, Dr Diaz aware of PVC's.PVC's are still occurring, though less frequently now. Daughter Leelee went home around 18:30, and called at 19:15. We assured her we will call her if anything changes, or if her mother wishes to have her company. Pt is in much better spirits over the last 2 hours, ate some chicken noodle soup, and a few bites of grilled cheese sandwich. She states her pain is \"not really changed,\" but she is \"accepting it.\"    "

## 2023-09-29 NOTE — PROGRESS NOTES
Care Management Follow Up    Length of Stay (days): 6    Expected Discharge Date: 09/30/2023     Concerns to be Addressed:       Patient plan of care discussed at interdisciplinary rounds: Yes    Anticipated Discharge Disposition:  (TBD) TCU?   Disposition Comments: CM continue to follow and assist with D/C planning. May want to return home with added hospice services. Daughter will transport. Daughter is primary family contact.  Anticipated Discharge Services:  (TBD)  Anticipated Discharge DME:  (TBD- currently on home 02 supplied by [employer] and has portable)    Patient/family educated on Medicare website which has current facility and service quality ratings:  (None at this time)  Education Provided on the Discharge Plan:  (AVS per bedside RN)  Patient/Family in Agreement with the Plan:      Referrals Placed by CM/SW:TCU   Private pay costs discussed: Not applicable    Additional Information:  MAIKOL talked with Resident Team .  Pt not ready for discharge today due to CT scan.  MAIKOL called and spoke with Airam with [facility] and may Pt maybe ready over th weekend.  Airam states that [place] will be assisting with admissions over the weekend and can be reached at the main number. PAS completed.  Dtr to transport.     BRADEN Weber

## 2023-09-29 NOTE — PROGRESS NOTES
[facility]    Progress Note - Hospitalist Service       Date of Admission:  9/23/2023    Assessment & Plan   Irene León is a 78 year old female admitted on 9/23/2023. She has a history of CHF with reduced ejection fraction (most recently EF 20-25% on 6/22), cardiomyopathy, severe COPD with chronic home oxygen 3 L at rest and 5-6 L with exertion, CAD, paroxysmal A-fib on Xarelto, CKD stage IV, lung cancer on oral chemo and breast cancer on oral chemo who is admitted for acute hypoxic respiratory failure likely multifactorial related to COPD and CHF. 9/28/23 showing new onset aspiration pneumonitis/pneumonia in the right lower lobe with moderate to large volume airway secretions; discharge was delayed, Zosyn was started.    Chest pain, unclear etiology  Patient reported acute-onset severe chest pain near the rib cage under the right breast on 9/29/23. The pain is constant and radiating around to the back. EKG, CXR, RUQ were obtained and unremarkable. Pain was not improved by nitroglycerin or aspirin administration. It was also not improved by oxycodone. Patient reported diaphoresis and nausea as well, the nausea responded somewhat to Zofran but patient still had emesis x2 episodes; emesis was liquid, bile-colored. CTPE did show new aspiration pneumonia. It's possible this is causing or contributing to pain though also suspect possible musculoskeletal etiology.  - CT Abdomen/pelvis ordered and pending  - Bedside RN swallow study ordered  - Pain team consulted, appreciate recs    Aspiration pneumonia  9/28/23 CT new manifestations of aspiration pneumonitis/pneumonia in the right lower lobe with moderate to large volume airway secretions. Started Zosyn on 9/28/23 PM.  - Continue IV Zosyn    Acute on chronic hypoxic respiratory failure   Possible COPD exacerbation   Patient with history of severe COPD on home oxygen at 3 LPM. Acutely SOB at home on day of admission requiring BiPAP by  Jacquelyn advised and states yes she saw Dr. Ina Rock in the past.   She will return call with fax number. EMS. VBG on arrival with pH 7.42 with CO2 of 56. Recevied duonebs x2 and solumedrol in ED, transitioned to 5L O2 via oxymask where she stabilized. Patient has been hospitalized for respiratory failure several times over the last few months, Likely multifactorial with COPD and CHF contributing as below. Patient was previously full code but was transitioned to DNR/DNI status after much discussion in ED on 9/23/23. Hospice/palliative team has been consulted and will be involved.  - Continue PTA inhalers, mucomyst, tessalon perles  - Duonebs and Mucomyst PRN   - S/p solumedrol in ER then transitioned to prednisone 40 mg    -Will taper down on prednisone, 5-day course of 20 mg daily followed by 5-day course of 10 mg daily  - Started on doxycycline and ceftriaxone in the ED   -doxycycline (9/23-9/28)   -ceftriaxone (9/23-9/24)   - Respiratory panel and sputum cultures pending collection as able  - Strict I&O's and daily weights  - Continuous pulse ox   - Supplemental O2 as needed to maintain oxygen saturations between 88%-92%, RRT is on board to manage further  - Hospice/palliative consulted and following, appreciate recommendations     CHF with reduced EF 20%-25%  Cardiomyopathy  Possible CHF exacerbation   Recent hospitalization for respiratory failure 2/2 CHF. BNP on admission ~6,000 (noted to be 9,600 on recent admission 9/15/23). Troponin elevated on admission to 43 but was 44 two days prior so no significant change. CHF likely contributing to respiratory failure. Switched from Lasix to Bumex on 9/24/23 per Cardiology.  - Cardiology consulted and following, appreciate recommendations   -Continue bumetanide 1 mg twice daily  -Continue on rivaroxaban for anticoagulation and statin therapy  -Consider outpatient appointment with EP to discuss CRT device.    - Strict I&O's and daily weights   - Continuous pulse ox   - Supplemental O2 as needed to maintain oxygen saturations 88-92% though as above, RRT is on board for  further management  - Cardiac tele resumed 9/25/23 in light of possible CHF exacerbation and addition of possible QTC prolonging medication     CKD stage 4  Cr 1.73 on admission, GFR 30, BUN 24.3. 9/25/23 creatinine 1.59, GFR 33, and BUN 27.5. This continues to be stable.  - AM BMP      Lower extremity wounds  - WOC consult placed 9/23/23, appreciate recommendations     Right food swelling  9/23/23 right foot XR showing that the right foot is negative for fracture, there is significant soft tissue swelling along the dorsal aspect of the foot, cellulitis could also have this appearance. Area of swelling is bruised and raised, appears to be hematoma but will continue to monitor daily for any changes. Tenderness to palpation but no surrounding erythema or warmth, and no fevers.   - Continues to improve on exam and per patient    Insomnia  Patient reporting minimal/no sleep despite melatonin 10 mg. Would be interested in additional medication for sleep/anxiety.   -PRN hydroxyzine 25-50 mg daily   -Patient has history of QTC prolongation so will monitor closely with cardiac tele as above which was also started due to possible CHF exacerbation    Leukocytosis   Leukocytosis in the setting of steroid use and new aspiration pneumonia, likely multifactorial.  - Trend CBC     Chronic conditions  Paroxysmal A. Fib: continue PTA DOAC (Xarelto) and PTA metoprolol   Lung cancer: follows with Dr. Suarez at [facility], currently oral chemo medication is on hold due to side effects   Breast cancer: follows with Dr. Suarez at [facility], continue daily PTA tamoxifen        Diet: Combination Diet Regular Diet Adult; 2 gm NA Diet; Low Saturated Fat Diet  Snacks/Supplements Adult: Ensure Enlive; Between Meals  Diet    DVT Prophylaxis: PTA DOAC  Hayes Catheter: Not present  Fluids: None  Lines: None     Cardiac Monitoring: ACTIVE order. Indication: Chest pain/ ACS rule out (24 hours)  Code Status: No CPR- Do NOT Intubate      Clinically Significant Risk  Factors              # Hypoalbuminemia: Lowest albumin = 2.9 g/dL at 9/29/2023  6:08 AM, will monitor as appropriate         # Hypertension: Noted on problem list    # Chronic heart failure with reduced ejection fraction: last echo with EF <40%           # COPD: noted on problem list          Disposition Plan      Expected Discharge Date: 09/30/2023, 12:00 PM      Discharge Comments: [facility] - Time?  Abdominal CT 9/29        The patient's care was discussed with the Attending Physician, Dr. Quinonez .    JOSE MIGUEL LAMAS MD  Hospitalist Service  [facility]  Securely message with [employer] (more info)  Text page via [employer] Paging/Directory   ______________________________________________________________________    Interval History   See significant event note from 9/28/23 for major interval event. This AM, patient reports feeling better. Pain closer to 6/10, significant worse though with movement. No headache, chest pain, SOB. Cough resolving well with symptomatic cares.     Physical Exam   Vital Signs: Temp: 98  F (36.7  C) Temp src: Oral BP: (!) 186/79 Pulse: 73   Resp: 16 SpO2: 91 % O2 Device: Nasal cannula Oxygen Delivery: 5 LPM  Weight: 107 lbs 1.6 oz    Constitutional: awake, alert, comfortable-appearing, laying in bed, no acute distress  HEENT: Lids and lashes normal, sclera clear, conjunctiva normal. Normocephalic and atraumatic. No rhinorrhea. Moist mucus membranes.   Respiratory: Patient breathing comfortably on 5L O2 via oxymask, no increased work of breathing or respiratory distress.  Abdomen: Slight increase in tenderness when palpating right upper abdomen along the rib line. No palpable masses. Normoactive bowel sounds.  Cardiovascular: Regular rate and rhythm, skin is warm and well-perfused.  Skin: Bruising throughout extremities. Several chronic-appearing scabs/lesions and dry skin throughout bilateral lower extremities. Raised, bruised area on dorsal aspect of  patient's right foot that is tender to palpation but has no surrounding erythema or warmth. Swelling is improved.  Musculoskeletal: Trace pitting edema of bilateral lower extremities.  Neurologic: Awake, alert, oriented to name, place and time. No apparent focal deficits.    Data     I have personally reviewed the following data over the past 24 hrs:    22.7 (H)  \   11.4 (L)   / 215     139 106 33.4 (H) /  104 (H)   4.4 23 1.54 (H) \     ALT: 8 AST: 35 AP: 50 TBILI: 0.2   ALB: 2.9 (L) TOT PROTEIN: 6.6 LIPASE: N/A     Trop: 22 (H) BNP: N/A       Imaging results reviewed over the past 24 hrs:   Recent Results (from the past 24 hour(s))   XR Chest Port 1 View    Narrative    EXAM: XR CHEST PORT 1 VIEW  LOCATION: [facility]  DATE: 9/28/2023    INDICATION: right side chest pain  COMPARISON: 09/23/2023 and multiple prior studies, PET CT 08/11/2023 and older exams.      Impression    IMPRESSION: No interval change given shallower inspiration. The extensive emphysema is better appreciated on the CT. Known, inferior right upper lobe pulmonary nodule again noted and overlies the right posterior seventh rib. Bandlike fibroatelectasis in   the left midlung and mild bibasilar fibrosis unchanged. No signs of acute pneumonia or failure. Heart and pulmonary vascularity are normal.   CT Chest Pulmonary Embolism w Contrast    Narrative    EXAM: CT CHEST PULMONARY EMBOLISM W CONTRAST  LOCATION: [facility]  DATE: 9/28/2023    INDICATION: Chest pain, dyspnea  COMPARISON: PET/CT from 08/11/2023  TECHNIQUE: CT chest pulmonary angiogram during arterial phase injection of IV contrast. Multiplanar reformats and MIP reconstructions were performed. Dose reduction techniques were used.   CONTRAST: 75ml Isovue 370    FINDINGS:  ANGIOGRAM CHEST: There is mixing artifact in the right lower lobe pulmonary artery on series 7 image 145. Pulmonary arteries are normal caliber and negative for  pulmonary emboli. Thoracic aorta is negative for dissection.     LUNGS AND PLEURA: Slight interval decrease in the size of the peripheral right upper lobe pulmonary nodule measuring 1.9 x 1.9 cm compared to 2.3 x 2.1 cm previously (series 8 image 116). However, there is increased size of the 2 satellite nodules. For   example on series 8 image 131 measuring 0.8 x 0.6 mm compared to 0.6 x 0.4 cm. There is a satellite nodule just superior to this which is new or more conspicuous compared to prior exam measuring 1.0 x 0.9 cm (series 8 image 123). The satellite nodule   superior to the dominant right lobe nodule on series 8 image 91 is similar measuring 1.2 x 0.6 cm.There is a new satellite nodule in the right upper lobe on series 8 image 92 measuring 0.7 x 0.5 cm.    Advanced emphysema. Scattered areas of atelectasis and scarring. Similar pleural thickening and architectural distortion in the anterior left upper lobe/left lingula on series 8 image 118. Moderate to large volume airway secretions particularly in the   right lung and right lower lobe with bronchial wall thickening, mucous plugging, with postobstructive consolidation and groundglass opacities. Mild bronchial wall thickening.    MEDIASTINUM/AXILLAE: Mild cardiomegaly. Mild reflux of contrast in the IVC and proximal hepatic veins. Persistent enlarged external adenopathy. A few of these are mildly increased in size compared to prior exam. The largest is in the lower paratracheal   chain measuring 2.1 x 1.5 cm on series 7 image 86 (previously 1.7 x 1.3 cm).    CORONARY ARTERY CALCIFICATION: Mild.    UPPER ABDOMEN: Ectasia of the infrarenal abdominal aorta measuring up to 2.8 cm. Extensive atherosclerosis.    MUSCULOSKELETAL: Degenerative changes of the spine. Chronic severe T12 compression deformity with associated focal kyphosis. No suspicious osseous lesions.      Impression    IMPRESSION:  1.  No pulmonary embolus.  2.  New manifestations of aspiration  pneumonitis/pneumonia in the right lower lobe with moderate to large volume airway secretions.  3.  Slightly decreased size of the dominant nodule within the right upper lobe.  4.  However, there are a few new or increasing satellite nodules in the right lung. Additionally, interval increase in the size of several of the mediastinal lymph nodes. Continued attention on follow-up.  5.  Advanced emphysema.   US Abdomen Limited*    Narrative    EXAM: US ABDOMEN LIMITED  LOCATION: [facility]  DATE: 9/28/2023    INDICATION: Acute onset right upper quadrant pain radiating to the back.  COMPARISON: PET/CT 08/11/2023.  TECHNIQUE: Limited abdominal ultrasound.    FINDINGS:    GALLBLADDER: No gallstones, wall thickening, or pericholecystic fluid. Negative sonographic Álvarez's sign.    BILE DUCTS: No biliary dilatation. The common duct measures 2 mm.    LIVER: Normal parenchyma with smooth contour. No focal mass.    RIGHT KIDNEY: No hydronephrosis.    PANCREAS: The visualized portions are normal.    No ascites. Incompletely seen right upper quadrant mass, measuring at least 5.5 cm.      Impression    IMPRESSION:    1.  Right upper quadrant mass (possibly adrenal in origin), suspicious for metastatic disease. Recommend contrast enhanced CT abdomen - pelvis.     2.  No cholelithiasis. Gallbladder wall thickness is exaggerated by partial distention of the gallbladder. Accounting for this, no definitive evidence for acute cholecystitis.    3.  Remaining exam unremarkable.

## 2023-09-29 NOTE — PLAN OF CARE
Problem: Plan of Care - These are the overarching goals to be used throughout the patient stay.    Goal: Absence of Hospital-Acquired Illness or Injury  Intervention: Identify and Manage Fall Risk  Recent Flowsheet Documentation  Taken 9/28/2023 2025 by Paulette Bruno RN  Safety Promotion/Fall Prevention:   activity supervised   clutter free environment maintained   increased rounding and observation   lighting adjusted   mobility aid in reach   nonskid shoes/slippers when out of bed   patient and family education   room door open   room near nurse's station     Pt A&Ox4. VSS on 4L, blood pressure trending down. Last /58. Voiding via purewich this evening. Not OOB. Persistent pain to right chest, back. Troponin at 1800 was 22. MD aware, no further recheck at this time. Tele NSR with PVCs. IV dilaudid 0.2 mg q2h scheduled helpful. Zosyn infusing now. Abdominal CT 9/29 due to kidney function. Will continue to monitor.

## 2023-09-29 NOTE — PLAN OF CARE
Problem: Oral Intake Inadequate  Goal: Improved Oral Intake  Outcome: Progressing  Intervention: Promote and Optimize Oral Intake  Flowsheets (Taken 9/29/2023 1329)  Oral Nutrition Promotion:   calorie-dense liquids provided   other (see comments)     Problem: COPD (Chronic Obstructive Pulmonary Disease)  Goal: Improved Nutrition Intake  Intervention: Promote and Optimize Oral Intake  Recent Flowsheet Documentation  Taken 9/29/2023 1329 by Tabitha Tijerina RD  Oral Nutrition Promotion:   calorie-dense liquids provided   other (see comments)   Goal Outcome Evaluation:  Met with pt at bedside this afternoon. Pt reports increased appetite and good oral intakes. Pt states eating same as baseline. Pt drinking supplements ensure enlive BID with ice cream. Pt with preference for strawberry ensure. Pt states she drank x2 ensure 9/28, and x1 this AM. Pt weight remains relatively stable.     Continuing ensure enlive BID.   Progress toward goals will be monitored and evaluated per protocol.

## 2023-09-30 NOTE — PLAN OF CARE
Problem: Plan of Care - These are the overarching goals to be used throughout the patient stay.    Goal: Optimal Comfort and Wellbeing  Outcome: Progressing   Goal Outcome Evaluation:       Pt alert and oriented, VSS, pt requiring 4L O2 via NC. Pt rates pain min-mod, controlled with scheduled tylenol and prn po dilaudid and atarax. Pt sitting up in chair. Amb assist of 1 to bathroom.

## 2023-09-30 NOTE — PLAN OF CARE
Pt alert and oriented x4. Vital signs stable. Pt reporting pain rated 7/10 on numeric pain scale that is located under her right breast and radiates to her back. PRN IV dilaudid given per pt request for pain management (see EMAR). PIV found to be dislodged  and partially out of the skin at time of initial shift assessment. New PIV inserted in right forearm; pt tolerated IV insertion well. Pt rested with eyes closed between assessments, on continuous pulse ox, able to make needs known. Toward end of shift, [place] noted to be slightly longer, rhythm noted was sinus with 1st degree, BBB, and prolonged QT. ST segment also appeared more peaked. Resident on call notified of tele changes. Orders received. Labs and EKG obtained (See pt's chart). Pt denies chest pain or cardiovascular symptoms at this time.     Problem: Plan of Care - These are the overarching goals to be used throughout the patient stay.    Goal: Absence of Hospital-Acquired Illness or Injury  Outcome: Progressing  Goal: Optimal Comfort and Wellbeing  Outcome: Progressing     Problem: Risk for Delirium  Goal: Optimal Coping  Outcome: Progressing   Goal Outcome Evaluation:

## 2023-09-30 NOTE — CONSULTS
General Surgery Consult Note     Irene León MRN# [id number]   YOB: 1945 Age: 78 year old      Date of Admission:  9/23/2023    Reason for Consult: Adrenal hemorrhage  Consulting Service: IM  Consulting Physician: Dr. Quinonez    Assessment: Irene León is a(n) 78 year old year old female with significant medical comorbidities, notably CHF (EF20%), COPD on home O2, stage IV CKD, afib on xarelto, and lung ca presented with respiratory failure, surgery consulted for hemorrhagic adrenal nodule.  She is hemodynamically stable and has an increasing hemoglobin.     Plan:  - Recommend conservative management, serial CBC, consider discontinuing xarelto, holding toradol if concern for continued bleeding  - If evidence of continued bleeding despite conservative measures, could consider IR intervention, however thorough discussion of GOC would be encouraged  - Ms. Lenó is not a surgical candidate, and would not tolerate a general anesthetic should she need a surgical intervention.  General surgery will sign off, please call with questions, concerns.    -------------------    HPI:  Irene León is a(n) 78 year old year old female with significant medical comorbidities, notably CHF (EF20%), COPD on home O2, stage IV CKD, afib on xarelto, and lung ca presented with respiratory failure, surgery consulted for hemorrhagic adrenal nodule.  She initially noted a stabbing pain under her right costal margin, later to her back.  This was 3 days ago.  It had been about the same since then, however she says she currently has no pain.  No n/v.  No f/c.      Past Medical History:  Past Medical History:   Diagnosis Date    ANATOLIY (acute kidney injury) (H)     Allergic rhinitis     Arrhythmia     Atrial fibrillation with RVR (H)     Bacteremia     Breast cancer (H) 2017    Cardiomyopathy (H)     Centrilobular emphysema (H)     CHF (congestive heart failure) (H)     Chronic kidney disease     CKD (chronic kidney disease)      COPD (chronic obstructive pulmonary disease) (H)     Coronary artery disease     Depression     Dysphagia     E. coli sepsis (H)     Factor 5 Leiden mutation, heterozygous (H)     GERD (gastroesophageal reflux disease)     Heart failure with reduced ejection fraction (H) 04/17/2023    Hx of radiation therapy 2017    Hyperlipidemia     Hypertension     Hypokalemia     Hypomagnesemia     Idiopathic cardiomyopathy (H)     Left hip pain 04/30/2014    OAB (overactive bladder)     Osteoporosis     Sacral insufficiency fracture     Sinusitis     Syncope     Urge incontinence     Vocal cord dysfunction        Past Surgical History:  Past Surgical History:   Procedure Laterality Date    ARTHROPLASTY REVISION HIP Left     BIOPSY BREAST Right 2017    BLADDER SURGERY      bladder interstim with removal    CARDIAC CATHETERIZATION      CATARACT EXTRACTION Left 07/18/2017    IR ABDOMINAL AORTOGRAM  4/16/2003    IR AORTIC ARCH 4 VESSEL ANGIOGRAM  4/16/2003    IR MISCELLANEOUS PROCEDURE  4/16/2003    LUMPECTOMY BREAST Left 06/2017    x2    PICC DOUBLE LUMEN PLACEMENT  4/11/2022         PICC TRIPLE LUMEN PLACEMENT  4/7/2022         ZZC OPEN TX FEMORAL FRACTURE DISTAL MED/LAT CONDYLE Left 10/28/2015    Procedure: OPEN REDUCTION INTERNAL FIXATION LEFT  PROXIMAL FEMUR PERIPROSTHETIC FRACTURE;  Surgeon: Yovanny Albarran MD;  Location: [facility] [place];  Service: Orthopedics       Allergies:     Allergies   Allergen Reactions    Sulfa (Sulfonamide Antibiotics) [Sulfa Antibiotics] Rash     Headaches and dizziness.    Homeopathic Drugs [External Allergen Needs Reconciliation - See Comment] Unknown     runny nose    Mold Extracts [Molds & Smuts] Unknown    Morphine (Pf) [Morphine] Unknown     hallucinate    Lisinopril Itching, Cough and Unknown     cough    Sulfacetamide Sodium [Sulfacetamide] Rash       Medications:  No current facility-administered medications on file prior to encounter.  acetaminophen (TYLENOL) 500 MG tablet, Take  1,000 mg by mouth every 8 hours as needed for mild pain or fever   acetylcysteine (MUCOMYST) 10 % nebulizer solution, Inhale 4 mLs into the lungs every 4 hours as needed for mucolysis/respiratory distress  albuterol (PROAIR HFA/PROVENTIL HFA/VENTOLIN HFA) 108 (90 Base) MCG/ACT inhaler, Inhale 2 puffs into the lungs every 6 hours as needed for shortness of breath, wheezing or cough  Azelastine HCl 137 MCG/SPRAY SOLN, [place] 1 spray into both nostrils 2 times daily as needed for rhinitis  benzonatate (TESSALON PERLES) 100 MG capsule, Take 1 capsule (100 mg) by mouth 3 times daily as needed for cough  CALCIUM-MAGNESIUM-ZINC PO, Take 1 tablet by mouth daily  cetirizine (ZYRTEC) 5 MG tablet, Take 1 tablet (5 mg) by mouth daily as needed for allergies  chlorhexidine (PERIDEX) 0.12 % solution, [CHLORHEXIDINE (PERIDEX) 0.12 % SOLUTION] Apply 15 mL to the mouth or throat at bedtime as needed.   escitalopram (LEXAPRO) 10 MG tablet, Take 1 tablet (10 mg) by mouth daily  fluticasone (FLONASE) 50 MCG/ACT nasal spray, Spray 1 spray into both nostrils daily as needed for rhinitis or allergies  Fluticasone-Umeclidin-Vilanterol (TRELEGY ELLIPTA) 200-62.5-25 MCG/INH oral inhaler, Inhale 1 puff into the lungs At Bedtime  furosemide (LASIX) 80 MG tablet, Take 1 tablet (80 mg) by mouth 2 times daily  gabapentin (NEURONTIN) 600 MG tablet, Take 600 mg by mouth At Bedtime  HYDROmorphone, STANDARD CONC, (DILAUDID) 1 MG/ML oral solution, Take 1 mg by mouth 3 times daily as needed for pain  ipratropium (ATROVENT HFA) 17 MCG/ACT inhaler, Inhale 2 puffs into the lungs every 6 hours as needed for wheezing  ipratropium - albuterol 0.5 mg/2.5 mg/3 mL (DUONEB) 0.5-2.5 (3) MG/3ML neb solution, Take 1 vial by nebulization every 6 hours as needed for shortness of breath, wheezing or cough  Melatonin 5 MG TBDP, Take 15 mg by mouth At Bedtime  mirabegron (MYRBETRIQ) 25 MG 24 hr tablet, Take 1 tablet (25 mg) by mouth daily  multivitamin w/minerals  (THERA-VIT-M) tablet, Take 1 tablet by mouth daily  nystatin (MYCOSTATIN) 628581 UNIT/GM external cream, Apply topically 3 times daily as needed for dry skin  OLANZapine (ZYPREXA) 5 MG tablet, Take 1 tablet (5 mg) by mouth At Bedtime  prochlorperazine (COMPAZINE) 10 MG tablet, Take 1 tablet (10 mg) by mouth every 6 hours as needed (Nausea/Vomiting)  rivaroxaban ANTICOAGULANT (XARELTO) 15 MG TABS tablet, Take 1 tablet (15 mg) by mouth At Bedtime  simvastatin (ZOCOR) 40 MG tablet, Take 1 tablet (40 mg) by mouth At Bedtime  sodium chloride 0.9 % neb solution, Take 3 mLs by nebulization every 3 hours as needed for wheezing  solifenacin (VESICARE) 5 MG tablet, Take 1 tablet (5 mg) by mouth daily  sotorasib (LUMAKRAS) 120 MG tablet, Take 8 tablets (960 mg) by mouth daily for 30 days Do not chew, crush or split tablets.  spironolactone (ALDACTONE) 25 MG tablet, Take 1 tablet (25 mg) by mouth daily  tamoxifen (NOLVADEX) 20 MG tablet, Take 1 tablet (20 mg) by mouth daily  traMADol (ULTRAM) 50 MG tablet, Take 50 mg by mouth 2 times daily as needed for severe pain  vitamin D3 (CHOLECALCIFEROL) 50 mcg (2000 units) tablet, Take 50 mcg by mouth daily  zinc oxide (DESITIN) 40 % external ointment, Apply topically as needed for dry skin or irritation (Twice a day to gluteal area skin irritation)  famotidine (PEPCID) 20 MG tablet, Take 1 tablet (20 mg) by mouth every other day        Social History:  Social History     Socioeconomic History    Marital status:      Spouse name: Not on file    Number of children: Not on file    Years of education: Not on file    Highest education level: Not on file   Occupational History    Not on file   Tobacco Use    Smoking status: Former     Types: Cigarettes     Quit date: 10/28/2007     Years since quitting: 15.9    Smokeless tobacco: Former     Quit date: 9/6/2004   Vaping Use    Vaping Use: Former   Substance and Sexual Activity    Alcohol use: No    Drug use: No    Sexual activity: Not  on file   Other Topics Concern    Not on file   Social History Narrative     and lives in home with 3 flight of steps     Social Determinants of Health     Financial Resource Strain: Not on file   Food Insecurity: Not on file   Transportation Needs: Not on file   Physical Activity: Not on file   Stress: Not on file   Social Connections: Not on file   Interpersonal Safety: Not on file   Housing Stability: Not on file       Family History:  Family History   Problem Relation Age of Onset    Osteoporosis Other     Prostate Cancer Brother     Breast Cancer Maternal Aunt         age thought to be in her 70's-80's    Prostate Cancer Maternal Uncle        ROS:  As noted above. No headache, dizziness. No fever, chills. + chest pain +  shortness of breath. No abdominal pain, nausea, vomiting. No diarrhea or constipation. No urinary difficulties. + muscle and body aches.    Exam:  BP (!) 167/72 (BP Location: Left arm)   Pulse 72   Temp 98.3  F (36.8  C) (Oral)   Resp 16   Wt 48.6 kg (107 lb 1.6 oz)   SpO2 91%   BMI 21.59 kg/m    General: Alert, interactive, & in NAD  Resp: NLB on NC  Cardiac: Regular rate; extremities warm;   Abdomen: Soft, nontender, nondistended. .  Skin: Warm and dry, no jaundice or rash    Labs:  All laboratory data reviewed  Notably: CBC increasing past few days    Imaging:   Personally reviewed, adrenal hemorrhage on CT    --    Lorenzo Fofana MD

## 2023-09-30 NOTE — CONSULTS
[facility] Hematology and Oncology Inpatient Consult Note    Patient: Irene León  MRN: [id number]  Date of Service: 9/30/2023      Reason for Visit    I was consulted by Dr. Starr  regarding Lung cancer, adrenal mets/ hemorrhage    Assessment/Plan    New adrenal metastases with Right adrenal hemorrhage  Acute on chronic hypoxic repiratory failure  Chf with Cardiomyopathy  Ckd  Locally advanced NSCLCA with KRAS G12C mutation      Patient has developed right chest pain with workup showing new bilateral adrenal mets and right adrenal hemorrhage.  Pain now improved.  No other bleeding.    Patient has been on Xarelto 3-4 years, not sure why she is on it but at high risk for thromboembolic events given Lung cancer, CHF and Factor V Leiden.    No good options for lung cancer as she did not tolerate Sotorasib and not a candidate for chemotherapy due to poor PS and multiple comorbities.  May consider restart of Sotorasib at lower dose.    Recommend:  Pain control  Avoid NSAIDS  Continue Xarelto  Dr Suarez will see Monday          ECOG Performance Status: 3            ______________________________________________________________________________      Staging History    Cancer Staging   Invasive ductal carcinoma of breast, female, left (H)  Staging form: Breast, AJCC 8th Edition  - Pathologic stage from 6/20/2017: Stage IA (pT1b, pN0(sn), cM0, G1, ER+, HI+, HER2-, Oncotype DX score: 6) - Signed by Devon Suarez MD on 7/4/2022        History  Ms. Irene León is a 78 year old with Stage 3 lung cancer, intolerant of Sotorasib, Chf, COPD, CKD, poor PS admitted with hypoxic respiratory failure.  Few days ago developed right chest pain with CT now showing bilateral adrenal mets and right adrenal hemorrhage.    Pain is better.  No other bleeding symptoms.  Not sure why she is on Xarelto.  No history of PE or DVT.  Positive for FV Leiden.    Review of systems.  No fever or night sweats.  No loss of weight.  No lumps  or bumps anywhere.  No unusual headaches or eyesight issues.  No dizziness.  No bleeding from the nose.  No sores in the mouth. No problems with swallowing.  No chest pain. No shortness of breath. No cough.  No abdominal pain. No nausea or vomiting.  No diarrhea or constipation.  No blood in stool or black colored stools.  No problems passing urine.  No numbness or tingling in hands or feet.  No skin rashes.  A 14 point review of systems is otherwise negative.        Past History  Past Medical History:   Diagnosis Date    ANATOLIY (acute kidney injury) (H)     Allergic rhinitis     Arrhythmia     Atrial fibrillation with RVR (H)     Bacteremia     Breast cancer (H) 2017    Cardiomyopathy (H)     Centrilobular emphysema (H)     CHF (congestive heart failure) (H)     Chronic kidney disease     CKD (chronic kidney disease)     COPD (chronic obstructive pulmonary disease) (H)     Coronary artery disease     Depression     Dysphagia     E. coli sepsis (H)     Factor 5 Leiden mutation, heterozygous (H)     GERD (gastroesophageal reflux disease)     Heart failure with reduced ejection fraction (H) 04/17/2023    Hx of radiation therapy 2017    Hyperlipidemia     Hypertension     Hypokalemia     Hypomagnesemia     Idiopathic cardiomyopathy (H)     Left hip pain 04/30/2014    OAB (overactive bladder)     Osteoporosis     Sacral insufficiency fracture     Sinusitis     Syncope     Urge incontinence     Vocal cord dysfunction      Past Surgical History:   Procedure Laterality Date    ARTHROPLASTY REVISION HIP Left     BIOPSY BREAST Right 2017    BLADDER SURGERY      bladder interstim with removal    CARDIAC CATHETERIZATION      CATARACT EXTRACTION Left 07/18/2017    IR ABDOMINAL AORTOGRAM  4/16/2003    IR AORTIC ARCH 4 VESSEL ANGIOGRAM  4/16/2003    IR MISCELLANEOUS PROCEDURE  4/16/2003    LUMPECTOMY BREAST Left 06/2017    x2    PICC DOUBLE LUMEN PLACEMENT  4/11/2022         PICC TRIPLE LUMEN PLACEMENT  4/7/2022         ZZC OPEN TX  FEMORAL FRACTURE DISTAL MED/LAT CONDYLE Left 10/28/2015    Procedure: OPEN REDUCTION INTERNAL FIXATION LEFT  PROXIMAL FEMUR PERIPROSTHETIC FRACTURE;  Surgeon: Yovanny Albarran MD;  Location: [facility] Main OR;  Service: Orthopedics     Family History   Problem Relation Age of Onset    Osteoporosis Other     Prostate Cancer Brother     Breast Cancer Maternal Aunt         age thought to be in her 70's-80's    Prostate Cancer Maternal Uncle      Social History     Socioeconomic History    Marital status:      Spouse name: None    Number of children: None    Years of education: None    Highest education level: None   Tobacco Use    Smoking status: Former     Types: Cigarettes     Quit date: 10/28/2007     Years since quitting: 15.9    Smokeless tobacco: Former     Quit date: 9/6/2004   Vaping Use    Vaping Use: Former   Substance and Sexual Activity    Alcohol use: No    Drug use: No   Social History Narrative     and lives in home with 3 flight of steps       Allergies    Allergies   Allergen Reactions    Sulfa (Sulfonamide Antibiotics) [Sulfa Antibiotics] Rash     Headaches and dizziness.    Homeopathic Drugs [External Allergen Needs Reconciliation - See Comment] Unknown     runny nose    Mold Extracts [Molds & Smuts] Unknown    Morphine (Pf) [Morphine] Unknown     hallucinate    Lisinopril Itching, Cough and Unknown     cough    Sulfacetamide Sodium [Sulfacetamide] Rash          Physical Exam    BP (!) 183/79 (BP Location: Left arm)   Pulse 74   Temp 98  F (36.7  C) (Oral)   Resp 18   Wt 48.6 kg (107 lb 1.6 oz)   SpO2 96%   BMI 21.59 kg/m      GENERAL: Alert and oriented to time place and person. Seated comfortably. In no distress.    HEAD: Atraumatic and normocephalic.    EYES: JOSE MANUEL, EOMI.  No pallor.  No icterus.    Oral cavity: no mucosal lesion or tonsillar enlargement.    NECK: supple. JVP normal.  No thyroid enlargement.    LYMPH NODES: No palpable, cervical, axillary or inguinal  lymphadenopathy.    CHEST: clear to auscultation bilaterally.  Resonant to percussion throughout bilaterally.  Symmetrical breath movements bilaterally.    CVS: S1 and S2 are heard. Regular rate and rhythm.  No murmur or gallop or rub heard.  No peripheral edema.    ABDOMEN: Soft. Not tender. Not distended.  No palpable hepatomegaly or splenomegaly.  No other mass palpable.  Bowel sounds heard.    EXTREMITIES: Warm.    SKIN: no rash, or bruising or purpura.  Has a full head of hair.    Lab Results  Recent Results (from the past 24 hour(s))   Basic metabolic panel    Collection Time: 09/30/23  5:06 AM   Result Value Ref Range    Sodium 143 135 - 145 mmol/L    Potassium 4.3 3.4 - 5.3 mmol/L    Chloride 108 (H) 98 - 107 mmol/L    Carbon Dioxide (CO2) 26 22 - 29 mmol/L    Anion Gap 9 7 - 15 mmol/L    Urea Nitrogen 35.1 (H) 8.0 - 23.0 mg/dL    Creatinine 1.62 (H) 0.51 - 0.95 mg/dL    GFR Estimate 32 (L) >60 mL/min/1.73m2    Calcium 8.6 (L) 8.8 - 10.2 mg/dL    Glucose 85 70 - 99 mg/dL   ECG 12-LEAD WITH MUSE (LHE)    Collection Time: 09/30/23  5:09 AM   Result Value Ref Range    Systolic Blood Pressure  mmHg    Diastolic Blood Pressure  mmHg    Ventricular Rate 60 BPM    Atrial Rate 60 BPM    IN Interval 132 ms    QRS Duration 124 ms     ms    QTc 492 ms    P Axis 56 degrees    R AXIS -54 degrees    T Axis 102 degrees    Interpretation ECG       Sinus rhythm  Left axis deviation  Left bundle branch block  Abnormal ECG  When compared with ECG of 28-SEP-2023 13:07,  Premature ventricular complexes are no longer Present     CBC with platelets and differential    Collection Time: 09/30/23  6:47 AM   Result Value Ref Range    WBC Count 20.4 (H) 4.0 - 11.0 10e3/uL    RBC Count 3.66 (L) 3.80 - 5.20 10e6/uL    Hemoglobin 10.4 (L) 11.7 - 15.7 g/dL    Hematocrit 33.9 (L) 35.0 - 47.0 %    MCV 93 78 - 100 fL    MCH 28.4 26.5 - 33.0 pg    MCHC 30.7 (L) 31.5 - 36.5 g/dL    RDW 14.4 10.0 - 15.0 %    Platelet Count 177 150 - 450  10e3/uL    % Neutrophils 81 %    % Lymphocytes 8 %    % Monocytes 10 %    % Eosinophils 0 %    % Basophils 0 %    % Immature Granulocytes 1 %    NRBCs per 100 WBC 0 <1 /100    Absolute Neutrophils 16.5 (H) 1.6 - 8.3 10e3/uL    Absolute Lymphocytes 1.6 0.8 - 5.3 10e3/uL    Absolute Monocytes 2.0 (H) 0.0 - 1.3 10e3/uL    Absolute Eosinophils 0.0 0.0 - 0.7 10e3/uL    Absolute Basophils 0.0 0.0 - 0.2 10e3/uL    Absolute Immature Granulocytes 0.2 <=0.4 10e3/uL    Absolute NRBCs 0.0 10e3/uL        Imaging Results    CT Abdomen Pelvis w Contrast    Result Date: 9/29/2023  EXAM: CT ABDOMEN PELVIS W CONTRAST LOCATION: [facility] DATE: 9/29/2023 INDICATION: acute onset right upper quadrant abdominal pain COMPARISON: Ultrasound 9/28/2023 TECHNIQUE: CT scan of the abdomen and pelvis was performed following injection of IV contrast. Multiplanar reformats were obtained. Dose reduction techniques were used. CONTRAST: 53ml Isovue 370 FINDINGS: LOWER CHEST: Tiny right pleural effusion with associated right lower lobe airspace opacities and 0.3 cm middle lobe nodule, unchanged compared to 9/28/2023 HEPATOBILIARY: Normal. PANCREAS: Normal. SPLEEN: Normal. ADRENAL GLANDS: 5.7 x 3.5 cm right adrenal nodule with surrounding high attenuation that extends inferiorly along the right kidney. 1.8 x 1.4 cm left adrenal nodule (3/53). Both are new compared to PET/CT of 8/11/2023. KIDNEYS/BLADDER: No significant mass, stones, or hydronephrosis. There are simple or benign cysts. No follow up is needed. Circumferential bladder wall thickening. BOWEL: Normal. LYMPH NODES: No lymphadenopathy. VASCULATURE: Moderate multifocal atherosclerotic calcification of the abdominal aorta and iliofemoral arteries. PELVIC ORGANS: Normal. MUSCULOSKELETAL: Osteopenia and degenerative changes in the spine with dextroconvex curvature of the lumbar spine. Moderate T12 compression deformity is unchanged. Left hip surgical hardware. No  aggressive or destructive osseous lesions. Partially imaged  subcutaneous nodule in the right lower back posteriorly.     IMPRESSION: 1.  Bilateral adrenal nodules are new compared to 8/11/2023 and concerning for metastases. There is high attenuation fluid associated with the right adrenal nodule, which is likely hemorrhage and likely accounts for acute right upper quadrant pain. Hemorrhage extends inferiorly in the retroperitoneum along the right kidney. 2.  Tiny right pleural effusion with associated airspace opacities.    US Abdomen Limited*    Result Date: 9/28/2023  EXAM: US ABDOMEN LIMITED LOCATION: [facility] DATE: 9/28/2023 INDICATION: Acute onset right upper quadrant pain radiating to the back. COMPARISON: PET/CT 08/11/2023. TECHNIQUE: Limited abdominal ultrasound. FINDINGS: GALLBLADDER: No gallstones, wall thickening, or pericholecystic fluid. Negative sonographic Álvarez's sign. BILE DUCTS: No biliary dilatation. The common duct measures 2 mm. LIVER: Normal parenchyma with smooth contour. No focal mass. RIGHT KIDNEY: No hydronephrosis. PANCREAS: The visualized portions are normal. No ascites. Incompletely seen right upper quadrant mass, measuring at least 5.5 cm.     IMPRESSION: 1.  Right upper quadrant mass (possibly adrenal in origin), suspicious for metastatic disease. Recommend contrast enhanced CT abdomen - pelvis. 2.  No cholelithiasis. Gallbladder wall thickness is exaggerated by partial distention of the gallbladder. Accounting for this, no definitive evidence for acute cholecystitis. 3.  Remaining exam unremarkable.     CT Chest Pulmonary Embolism w Contrast    Result Date: 9/28/2023  EXAM: CT CHEST PULMONARY EMBOLISM W CONTRAST LOCATION: [facility] DATE: 9/28/2023 INDICATION: Chest pain, dyspnea COMPARISON: PET/CT from 08/11/2023 TECHNIQUE: CT chest pulmonary angiogram during arterial phase injection of IV contrast. Multiplanar reformats and MIP  reconstructions were performed. Dose reduction techniques were used. CONTRAST: 75ml Isovue 370 FINDINGS: ANGIOGRAM CHEST: There is mixing artifact in the right lower lobe pulmonary artery on series 7 image 145. Pulmonary arteries are normal caliber and negative for pulmonary emboli. Thoracic aorta is negative for dissection. LUNGS AND PLEURA: Slight interval decrease in the size of the peripheral right upper lobe pulmonary nodule measuring 1.9 x 1.9 cm compared to 2.3 x 2.1 cm previously (series 8 image 116). However, there is increased size of the 2 satellite nodules. For example on series 8 image 131 measuring 0.8 x 0.6 mm compared to 0.6 x 0.4 cm. There is a satellite nodule just superior to this which is new or more conspicuous compared to prior exam measuring 1.0 x 0.9 cm (series 8 image 123). The satellite nodule superior to the dominant right lobe nodule on series 8 image 91 is similar measuring 1.2 x 0.6 cm.There is a new satellite nodule in the right upper lobe on series 8 image 92 measuring 0.7 x 0.5 cm. Advanced emphysema. Scattered areas of atelectasis and scarring. Similar pleural thickening and architectural distortion in the anterior left upper lobe/left lingula on series 8 image 118. Moderate to large volume airway secretions particularly in the right lung and right lower lobe with bronchial wall thickening, mucous plugging, with postobstructive consolidation and groundglass opacities. Mild bronchial wall thickening. MEDIASTINUM/AXILLAE: Mild cardiomegaly. Mild reflux of contrast in the IVC and proximal hepatic veins. Persistent enlarged external adenopathy. A few of these are mildly increased in size compared to prior exam. The largest is in the lower paratracheal chain measuring 2.1 x 1.5 cm on series 7 image 86 (previously 1.7 x 1.3 cm). CORONARY ARTERY CALCIFICATION: Mild. UPPER ABDOMEN: Ectasia of the infrarenal abdominal aorta measuring up to 2.8 cm. Extensive atherosclerosis. MUSCULOSKELETAL:  Degenerative changes of the spine. Chronic severe T12 compression deformity with associated focal kyphosis. No suspicious osseous lesions.     IMPRESSION: 1.  No pulmonary embolus. 2.  New manifestations of aspiration pneumonitis/pneumonia in the right lower lobe with moderate to large volume airway secretions. 3.  Slightly decreased size of the dominant nodule within the right upper lobe. 4.  However, there are a few new or increasing satellite nodules in the right lung. Additionally, interval increase in the size of several of the mediastinal lymph nodes. Continued attention on follow-up. 5.  Advanced emphysema.    XR Chest Port 1 View    Result Date: 9/28/2023  EXAM: XR CHEST PORT 1 VIEW LOCATION: [facility] DATE: 9/28/2023 INDICATION: right side chest pain COMPARISON: 09/23/2023 and multiple prior studies, PET CT 08/11/2023 and older exams.     IMPRESSION: No interval change given shallower inspiration. The extensive emphysema is better appreciated on the CT. Known, inferior right upper lobe pulmonary nodule again noted and overlies the right posterior seventh rib. Bandlike fibroatelectasis in the left midlung and mild bibasilar fibrosis unchanged. No signs of acute pneumonia or failure. Heart and pulmonary vascularity are normal.    XR Foot Port Right 2 Views    Result Date: 9/23/2023  EXAM: XR FOOT PORT RIGHT 2 VIEWS LOCATION: [facility] DATE: 9/23/2023 INDICATION: pain, bruisng, swelling over 5th metatarsal COMPARISON: None.     IMPRESSION: The right foot is negative for fracture. There is significant soft tissue swelling along the dorsal aspect of the foot. Cellulitis could also have this appearance.    XR Chest Port 1 View    Result Date: 9/23/2023  EXAM: XR CHEST PORT 1 VIEW LOCATION: [facility] DATE: 9/23/2023 INDICATION: shortness of breath, COPD vs CHF exacerbation COMPARISON: 09/15/2023     IMPRESSION: Heart size is stable.  Right midlung nodule measuring 2.5 cm, unchanged. Decrease in linear parenchymal opacity in the left midlung. Chronic interstitial scarring/fibrosis bilaterally. There is some increasing pleural thickening in the left upper lobe. No pneumothorax. Old right rib fracture.       Signed by: Tania Alexandra MD

## 2023-09-30 NOTE — PROGRESS NOTES
"[facility] ACUTE PAIN SERVICE    ([facility], [facility], [facility])   Daily PAIN Progress Note    Assessment/Plan:  Irene León is a 78 year old female who was admitted on 9/23/2023.  Pain team was asked to see the patient for acute onset severe pain - unclear etiology. History of CHF with reduced ejection fraction (most recently EF 20-25% on 6/22), cardiomyopathy, severe COPD with chronic home oxygen 3 L at rest and 5-6 L with exertion, CAD, paroxysmal A-fib on Xarelto, CKD stage IV, lung cancer on oral chemo and breast cancer on oral chemo who is admitted for acute hypoxic respiratory failure. She is followed in outpatient palliative care clinic, last seen on 8/15/23. The patient is a former smoker and denies chemical dependency history.      Opioid Induced Respiratory Depression Risk Assessment: HIGH due to the following risk factors: COPD on home O2, CHF, renal dysfunction, Age>60, concomitant CNS depressants, opioid naive status      Patient had reported acute-onset severe chest pain near the rib cage under the right breast. The pain was constant and radiating around to the back. EKG, CXR were obtained and unremarkable. Pain was not improved by nitroglycerin or aspirin administration. It was also not improved by oxycodone. Patient reported diaphoresis and nausea as well, the nausea responded somewhat to Zofran but patient still had emesis x2 episodes; emesis was liquid, bile-colored. Per cardiology, \"CT PE protocol negative for PE but notable for new aspiration pneumonia.Pain does not sound cardiac by description.\"     US abdomen 9/28 - Right upper quadrant mass (possibly adrenal in origin), suspicious for metastatic disease. Recommend contrast enhanced CT abdomen - pelvis.     Yesterday, patient reporting pain much improved, 5/10, today pain is minimal under breast reaching around back, right side. Patient feels Dilaudid 2 mg dose helpful. Dilaudid utilized twice yesterday, 2 mg each, " no IV used in last 24 hours, will discontinue. Patient denies constipation, nausea, patient is not sedated or confused.Patient is on 4 L 02.  Patient feeling very well except with loose stools, she will speak to resident about this.    No changes recommended, except discontinue Dilaudid IV,  discussed with Noemí Ch, CNS, DNP , Acute Pain Management Team      PLAN:   1) Pain is consistent with unknown etiology - acute/sever. Cardiology consulted and signed off  2)Multimodal Medication Therapy  Topical: add lidocaine patch x 2 daily   NSAID'S: none d/t crcl = 23 ml/min and age and on anticoagulation and steroids and CHF -  Steroids: prednisone 20 mg po daily   Muscle Relaxants: none warranted  Adjuvants: gabapentin now at 300 mg q hs mg  - caution can worsen CHF/peripheral edema - required bumex during admission and right foot swelling present during admission - decrease to 300 mg - dose dependent taper to lowest most effective dose, APAP prn - schedule 975 mg po TID, hydroxyzine 25 mg po TID prn,   Antidepressants/anxiolytics: home lexapro 10 mg po daily, zyprexa 5 mg po at bedtime,   Opioids: Dilaudid 1-2 mg po q4h prn first line  IV Pain medication: DC  3)Non-medication interventions: per nursing  Acupuncture consult - if agreeable  Integrative consult - if agreeable  4)Constipation Prophylaxis:  senna/doc x 1 po BID, miralax prn, bisacodyl prn  5) Care Teams: [facility], cardiology, pain     -[place]  pulled from system on 9/29/23. This indicates last filled tramadol 50 mg #60 (30 day supply) on 8/25 from Tobi Miguel, and gabapentin 600 mg on 6/25 #30 (30 day supply) from Jacob Campa MD.   Discharge Recommendations - We recommend prescribing the following at the time of discharge: 3-5 day Supply Dilaudid, low dose.      Principal Problem:    COPD exacerbation (H)     LOS: 7 days       Joyce Sandoval [facility]  Acute Care Pain Management Program  [facility] (Fredy CESAR, Jorge)   Preference if for [employer] Paging -  [place]  Click HERE to page Noemí

## 2023-10-01 NOTE — PROGRESS NOTES
[facility]    Progress Note - Hospitalist Service       Date of Admission:  9/23/2023    Assessment & Plan   Irene León is a 78 year old female admitted on 9/23/2023. She Irene León is a 78 year old female admitted on 9/23/2023. She has a history of CHF with reduced ejection fraction (most recently EF 20-25% on 6/22), cardiomyopathy, severe COPD with chronic home oxygen 3 L at rest and 5-6 L with exertion, CAD, paroxysmal A-fib on Xarelto, CKD stage IV, lung cancer on oral chemo and breast cancer on oral chemo who is admitted for acute hypoxic respiratory failure likely multifactorial related to COPD and CHF. 9/28/23 showing new onset aspiration pneumonitis/pneumonia in the right lower lobe with moderate to large volume airway secretions; discharge was delayed, Zosyn was started. 9/29/23 CT showing new finding of bilateral adrenal nodules concerning for metastases, with likely hemorrhage.      R sided chest pain   New R. Adrenal Mass, hemorrhage  Invasive ductal carcinoma of the breast  right upper lobe pulmonary lung cancer  Acute onset R chest pain 9/29/23. EKG, CXR, RUQ were obtained and unremarkable. CTPE did show new aspiration pneumonia. CT abdomen/pelvis which showed right adrenal nodule hemorrhage, concern for metastasis in the setting of known invasive ductal carcinoma of the breast and right upper lobe pulmonary lung cancer.  - Pain team consulted, appreciate recs              -lidocaine patch x 2 daily              -discontinue Toradol               -decrease gabapentin to 300 mg at bedtime given risk of worsening peripheral edema              -scheduled APAP 975 mg TID              -hydroxyzine 50 mg TID PRN              -Dilaudid 1-2 mg PO q4hr PRN first line, Dilaudid 0.2 mg IV q2hr PRN second line              -add senna/doc x 1 PO BID, MiraLAX PRN, bisacodyl PRN  -Hemonc consulted; appreciate recommendations  - Pain control  -Avoid NSAIDS  -Continue  [place]  -Dr Suarez will see Monday  - General Surgery consult recommendations:   -Recommend conservative management, serial CBC, consider discontinuing xarelto, holding toradol if concern for continued bleeding  -If evidence of continued bleeding despite conservative measures, could consider IR intervention, however thorough discussion of GOC would be encouraged  -Ms. León is not a surgical candidate, and would not tolerate a general anesthetic should she need a surgical intervention.  -signed off   -Palliative team following     Aspiration pneumonia  Leukocytosis, improving   Possible COPD exacerbation  history of severe COPD on home oxygen at 3 LPM. 9/28/23 CT new manifestations of aspiration pneumonitis/pneumonia in the right lower lobe with moderate to large volume airway secretions. Started Zosyn on 9/28/23 PM. Patient reports improvement in cough.  - Continue IV Zosyn  - Continue robitussin PRN TID for cough  - Continue PTA inhalers, mucomyst, tessalon perles  - Duonebs and Mucomyst PRN   - S/p solumedrol in ER  -Prednisone taper as follows:   -  5-day course of 20 mg daily (started 9/30/23) followed by 5-day course of 10 mg daily  - Antibiotics to date:               -doxycycline (9/23-9/28)              -ceftriaxone (9/23-9/24)   - Respiratory panel and sputum cultures pending collection as able  - Strict I&O's and daily weights  - Continuous pulse ox   - Supplemental O2 as needed to maintain oxygen saturations between 88%-92%, RRT is on board to manage further  - Hospice/palliative consulted and following, appreciate recommendations     CHF with reduced EF 20%-25%  Cardiomyopathy  CHF exacerbation   Recent hospitalization for respiratory failure 2/2 CHF. BNP on admission ~6,000 (noted to be 9,600 on recent admission 9/15/23). Troponin elevated on admission to 43 but was 44 two days prior so no significant change. CHF likely contributing to respiratory failure. Switched from Lasix to Bumex on 9/24/23 per  Cardiology.  - Cardiology consulted, appreciate recommendations              -Continue bumetanide 1 mg twice daily  -Continue on rivaroxaban for anticoagulation and statin therapy  -Consider outpatient appointment with EP to discuss CRT device.    - Strict I&O's and daily weights   - Continuous pulse ox   - Supplemental O2 as needed to maintain oxygen saturations 88-92% though as above, RRT is on board for further management  - Cardiac tele resumed 9/25/23 in light of possible CHF exacerbation and addition of possible QTC prolonging medication.     CKD stage 4  Cr 1.73 on admission, GFR 30. Baseline Cr approx 1.4-1.6. 1.7 this AM.   -encourage PO intake   - Trend BMP      Lower extremity wounds  - WOC consult placed 9/23/23, appreciate recommendations      Right foot swelling  9/23/23 right foot XR showing that the right foot is negative for fracture, there is significant soft tissue swelling along the dorsal aspect of the foot, cellulitis could also have this appearance. Area of swelling is bruised and raised, appears to be hematoma but will continue to monitor daily for any changes. Tenderness to palpation but no surrounding erythema or warmth, and no fevers.   - Continues to improve on exam and per patient     Insomnia  - PRN melatonin 10 mg at bedtime  - PRN hydroxyzine 25-50 mg daily              -Patient has history of QTC prolongation so will monitor closely with cardiac tele as above which was also started due to possible CHF exacerbation       Loose stools   Per RN, patient reported loose stools and requests imodium. Medication has increased risk of PT prolongation  -loperamide ordered   -cardiac tele ordered given significant cardiac risk factors as mentioned above     Chronic conditions  Paroxysmal A. Fib: continue PTA DOAC (Xarelto) and PTA metoprolol   Lung cancer: follows with Dr. Suarez at [facility], currently oral chemo medication is on hold due to side effects   Breast cancer: follows with Dr. Suarez at  "[facility], continue daily PTA tamoxifen              Diet: Combination Diet Regular Diet Adult; 2 gm NA Diet; Low Saturated Fat Diet  Snacks/Supplements Adult: Ensure Enlive; Between Meals  Diet    DVT Prophylaxis: DOAC  Hayes Catheter: Not present  Fluids: PO  Lines: None     Cardiac Monitoring: ACTIVE order. Indication: Chest pain/ ACS rule out (24 hours)  Code Status: No CPR- Do NOT Intubate      Clinically Significant Risk Factors              # Hypoalbuminemia: Lowest albumin = 2.9 g/dL at 9/29/2023  6:08 AM, will monitor as appropriate     # Hypertension: Noted on problem list  # Chronic heart failure with reduced ejection fraction: last echo with EF <40%           # COPD: noted on problem list        Disposition Plan      Expected Discharge Date: 10/02/2023, 12:00 PM      Discharge Comments: [facility] - Time?  Abdominal CT 9/29        The patient's care was discussed with the Attending Physician, Dr. Quinonez .    Stan Diaz, DO PGY3  Hospitalist Service  [facility]  Securely message with [employer] (more info)  Text page via [employer] Paging/Directory   ______________________________________________________________________    Interval History   Overnight, patient was reported to have increased secretions but stable O2 needs.  RT was consulted for pulmonary toileting.  Patient also reported loose stools and requested liquid Pepto-Bismol.    Deepthi was seen sitting in the chair.  Continues on supplemental oxygen at 3 L.  She states she \"feels much better\".  She said that she did really well with physical therapy today.  She is awaiting her daughters that today.    Physical Exam   Vital Signs: Temp: 98.5  F (36.9  C) Temp src: Oral BP: 128/56 Pulse: 59   Resp: 17 SpO2: 97 % O2 Device: Nasal cannula Oxygen Delivery: 4 LPM  Weight: 117 lbs 3.2 oz    Physical Exam  Constitutional:       General: She is not in acute distress.     Appearance: Normal appearance. She is not toxic-appearing. "   HENT:      Head: Normocephalic and atraumatic.      Mouth/Throat:      Mouth: Mucous membranes are moist.   Eyes:      Conjunctiva/sclera: Conjunctivae normal.   Cardiovascular:      Rate and Rhythm: Normal rate and regular rhythm.   Pulmonary:      Effort: Pulmonary effort is normal.      Comments: Diminished breath sounds bilaterally  Abdominal:      General: Abdomen is flat. Bowel sounds are normal.      Palpations: Abdomen is soft.      Comments: No reproducible R sided chest pain   Musculoskeletal:         General: Normal range of motion.      Comments: Leg/ankle swelling improved from previous    Neurological:      Mental Status: She is alert and oriented to person, place, and time.   Psychiatric:         Mood and Affect: Mood normal.            Data     I have personally reviewed the following data over the past 24 hrs:    13.4 (H)  \   8.7 (L)   / 175     144 110 (H) 35.8 (H) /  92   3.7 28 1.71 (H) \       Imaging results reviewed over the past 24 hrs:   No results found for this or any previous visit (from the past 24 hour(s)).

## 2023-10-01 NOTE — PROGRESS NOTES
RT instructed pt on use of aerobika.  Pt understood and tolerated using aerobika.  Pt is aware of PRN nebulizer.  Pt stated that she does not like nebulizer and would not like to use them at this time. BS: coarse and diminished with expiratory wheeze.  RT encouraged pt to call RN/RT if she were to change her mind about the nebulizer. Pt on 4L.

## 2023-10-01 NOTE — PROGRESS NOTES
Care Management Follow Up    Length of Stay (days): 8    Expected Discharge Date: 10/02/2023     Concerns to be Addressed:       Patient plan of care discussed at interdisciplinary rounds: Yes    Anticipated Discharge Disposition:  (TBD)  Disposition Comments: CM continue to follow and assist with D/C planning. May want to return home with added hospice services. Daughter will transport. Daughter is primary family contact.  Anticipated Discharge Services:  (TBD)  Anticipated Discharge DME:  (TBD- currently on home 02 supplied by [employer] and has portable)    Patient/family educated on Medicare website which has current facility and service quality ratings:  (None at this time)  Education Provided on the Discharge Plan:  (AVS per bedside RN)  Patient/Family in Agreement with the Plan:      Referrals Placed by CM/SW:  (None at this time)  Private pay costs discussed: Not applicable at this time     Additional Information:  Chart reviewed.  Pt will be seen by Hem/Onc on Monday.  New DX's.  Dtr wants to be present for any care discussions.  Pt's   last week at [facility].  Discharge plan needs to be established when medical plan is decided.     MAIKOL talked with [place] at [facility] and have accepted Pt when medically ready from last week.  PAS was completed last week.     BRADEN Weber

## 2023-10-01 NOTE — PLAN OF CARE
Problem: Plan of Care - These are the overarching goals to be used throughout the patient stay.    Goal: Optimal Comfort and Wellbeing  Outcome: Progressing   Goal Outcome Evaluation:         Pt alert and oriented x4, anxious at times when work of breathing increased  with activity. VSS, on 3-4L NC. Pain managed with Prn po Dilaudid. Pt c/o loose stool x2, prn imodium given. Diarrhea resolved. Up w/assist gb/w.

## 2023-10-01 NOTE — PROVIDER NOTIFICATION
Pt increase work of breathing while ambulating to the bathroom. Plural rub heard on auscultation. VSS on 4L. MD notified and order placed with pulmonary toilet. Pt work of breathing improved while at rest.

## 2023-10-01 NOTE — PLAN OF CARE
Problem: Plan of Care - These are the overarching goals to be used throughout the patient stay.    Goal: Optimal Comfort and Wellbeing  Intervention: Monitor Pain and Promote Comfort   Goal Outcome Evaluation:  Patient is alert and oriented X 4. PRN Albuterol inhaler utilized for C/O SOB after using the bedside commode. 02 at 4L via NC. Denied pain or chest pain. Telemetry reading Sinus Rhythm with BBB and prolonged QT. Voided spontaneously. Patient had loose stool X 1. Afebrile. IV saline locked. Call light within reach.

## 2023-10-01 NOTE — PROGRESS NOTES
"    [facility] ACUTE PAIN SERVICE    ([facility], [facility], [facility])   Daily PAIN Progress Note     Assessment/Plan:  Irene León is a 78 year old female who was admitted on 9/23/2023.  Pain team was asked to see the patient for acute onset severe pain - unclear etiology. History of CHF with reduced ejection fraction (most recently EF 20-25% on 6/22), cardiomyopathy, severe COPD with chronic home oxygen 3 L at rest and 5-6 L with exertion, CAD, paroxysmal A-fib on Xarelto, CKD stage IV, lung cancer on oral chemo and breast cancer on oral chemo who is admitted for acute hypoxic respiratory failure. She is followed in outpatient palliative care clinic, last seen on 8/15/23. The patient is a former smoker and denies chemical dependency history.      Opioid Induced Respiratory Depression Risk Assessment: HIGH due to the following risk factors: COPD on home O2, CHF, renal dysfunction, Age>60, concomitant CNS depressants, opioid naive status      Patient had reported acute-onset severe chest pain near the rib cage under the right breast. The pain is constant and radiating around to the back. EKG, CXR were obtained and unremarkable. Pain was not improved by nitroglycerin or aspirin administration. It was also not improved by oxycodone. Patient reported diaphoresis and nausea as well, the nausea responded somewhat to Zofran but patient still had emesis x2 episodes; emesis was liquid, bile-colored. Per cardiology, \"CT PE protocol negative for PE but notable for new aspiration pneumonia.Pain does not sound cardiac by description.\"     US abdomen 9/28 - Right upper quadrant mass (possibly adrenal in origin), suspicious for metastatic disease. Recommend contrast enhanced CT abdomen - pelvis.    Today, patient reporting pain is bettter, about 5/10,, minimal pain under breast wrapping to back,  feels Dilaudid 2 mg dose helpful, same location, under right breast, moving to the back. Dilaudid utilized " twice yesterday, 2 mg each, no IV used in last 24 hours, will discontinue. Patient denies constipation, nausea, patient is not sedated or confused.Patient is on 4 L 02. Patient reporting feeling well today., except with loose stools.     No changes recommended, discussed with Noemí Ch, CNS, DNP , Acute Pain Management Team      PLAN:   1) Pain is consistent with unknown etiology - acute/sever. Cardiology consulted and signed off  2)Multimodal Medication Therapy  Topical: add lidocaine patch x 2 daily   NSAID'S: none d/t crcl = 23 ml/min and age and on anticoagulation and steroids and CHF -  Steroids: prednisone 20 mg po daily   Muscle Relaxants: none warranted  Adjuvants: gabapentin now at 300 mg q hs mg  - caution can worsen CHF/peripheral edema - required bumex during admission and right foot swelling present during admission - decrease to 300 mg - dose dependent taper to lowest most effective dose, APAP prn - schedule 975 mg po TID, hydroxyzine  po TID prn,   Antidepressants/anxiolytics: home lexapro 10 mg po daily, zyprexa 5 mg po at bedtime,   Opioids: Dilaudid 1-2 mg po q4h prn first line  IV Pain medication: DC  3)Non-medication interventions: per nursing  Acupuncture consult - if agreeable  Integrative consult - if agreeable  4)Constipation Prophylaxis:  senna/doc x 1 po BID, miralax prn, bisacodyl prn  5) Care Teams: HMS, cardiology, pain     -MN  pulled from system on 9/29/23. This indicates last filled tramadol 50 mg #60 (30 day supply) on 8/25 from Tobi Miguel, and gabapentin 600 mg on 6/25 #30 (30 day supply) from Jacob Campa MD.   Discharge Recommendations - We recommend prescribing the following at the time of discharge: 3-5 day Supply Dilaudid, low dose.        Principal Problem:    COPD exacerbation (H)           Joyce Sandoval, [facility]  Acute Care Pain Management Program  [facility] (Fredy CESAR, Jorge)   Preference if for [employer] Paging - Ruegg  Click HERE to page Noemí

## 2023-10-02 NOTE — PROGRESS NOTES
[facility] ACUTE PAIN SERVICE    ([facility], [facility], [facility])   Daily PAIN Progress Note    Assessment/Plan:  Irene León is a 78 year old female who was admitted on 9/23/2023.  Pain team was asked to see the patient for acute onset severe pain - unclear etiology. History of CHF with reduced ejection fraction (most recently EF 20-25% on 6/22), cardiomyopathy, severe COPD with chronic home oxygen 3 L at rest and 5-6 L with exertion, CAD, paroxysmal A-fib on Xarelto, CKD stage IV, lung cancer on oral chemo and breast cancer on oral chemo who is admitted for acute hypoxic respiratory failure. She is followed in outpatient palliative care clinic, last seen on 8/15/23. The patient is a former smoker and denies chemical dependency history.      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   Today, patient reporting pain is minimal, under right breast, in the last 24 hours, has used two doses of Dilaudid 1 mg, will keep at same dose for now, may be able to discharge on 1 mg vs 1-2 mg. Patient not sedated or confused. Patient reports not as frequent loose stools. Discussed also with Palliative Care.     No changes recommended, discussed with Noemí Ch, CNS, DNP , Acute Pain Management Team      PLAN:   1) Pain is consistent with unknown etiology - acute/sever. Cardiology consulted and signed off  2)Multimodal Medication Therapy  Topical: add lidocaine patch x 2 daily   NSAID'S: none d/t crcl = 23 ml/min and age and on anticoagulation and steroids and CHF -  Steroids: prednisone 20 mg po daily   Muscle Relaxants: none warranted  Adjuvants: gabapentin now at 300 mg q hs mg  - caution  can worsen CHF/peripheral edema - required bumex during admission and right foot swelling present during admission - decrease to 300 mg - dose dependent taper to lowest most effective dose, APAP prn - schedule 975 mg po TID, hydroxyzine  po TID prn,   Antidepressants/anxiolytics: home lexapro 10 mg po daily, zyprexa 5 mg po at bedtime,   Opioids: Dilaudid 1-2 mg po q4h prn first line  IV Pain medication: DC  3)Non-medication interventions: per nursing  Acupuncture consult - if agreeable  Integrative consult - if agreeable  4)Constipation Prophylaxis:  senna/doc x 1 po BID, miralax prn, bisacodyl prn  5) Care Teams: HMS, cardiology, pain     -MN  pulled from system on 9/29/23. This indicates last filled tramadol 50 mg #60 (30 day supply) on 8/25 from Tobi Miguel, and gabapentin 600 mg on 6/25 #30 (30 day supply) from Jacob Campa MD.   Discharge Recommendations - We recommend prescribing the following at the time of discharge: 3-5 day Supply Dilaudid, low dose.            Principal Problem:    COPD exacerbation (H)     LOS: 9 days           Joyce Sandoval [facility]  Acute Care Pain Management Program  [facility] (Fredy CESAR JNs)   Preference if for [employer] Paging - Ruegg  Click HERE to page Noemí

## 2023-10-02 NOTE — PROGRESS NOTES
[facility]  WO Nurse Inpatient Assessment     Consulted for: BLEs    Summary: Assessed patients's legs today. No new wounds, all scabbed over.     Patient History (according to provider note(s):      Irene León is a 78 year old female admitted on 9/23/2023. She has a history of CHF with reduced ejection fraction (most recently EF 20-25% on 6/22), cardiomyopathy, severe COPD with chronic home oxygen 3 L at rest and 5-6 L with exertion, CAD, paroxysmal A-fib on Xarelto, CKD stage IV, lung cancer on oral chemo and breast cancer on oral chemo who is admitted for acute hypoxic respiratory failure.        Assessment:      Skin Injury Location: BLEs        9/25                                                                                                                                              L lateral calf    Last photo: 9/25  Skin injury due to: Hematoma and Skin tear  Skin history and plan of care:   Patient has paper thin skin, multiple hematomas in various stages of healing.   Affected area:      Skin assessment: Denudement     Measurements (length x width x depth, in cm) NA dried scab     Color: normal and consistent with surrounding tissue     Temperature  cool     Drainage: none .      Color: none      Odor: none  Pain: moderate, aching  Pain interventions prior to dressing change: N/A  Treatment goal: Heal  and Protection  STATUS: stable  Supplies ordered: supplies stored on unit       Treatment Plan:     Patient has no current open area but mepilex can still be used off and on for protection and if new areas open.     BLE - PRN if open areas  Flush wounds with NS, pat dry  Cover wounds with mepilex  Change dressings every 5 days + PRN if soiled, saturated, falls off    Orders: Updated    RECOMMEND PRIMARY TEAM ORDER: None, at this time  Education provided: plan of care and wound progress  Discussed plan of care with: Patient and Family  [facility] nurse follow-up plan: every other  week  Notify [facility] if wound(s) deteriorate.  Nursing to notify the Provider(s) and re-consult the [facility] Nurse if new skin concern.    DATA:     Current support surface: Standard  Standard gel/foam mattress (IsoFlex, Atmos air, etc)  Containment of urine/stool: Continent of bladder and Continent of bowel  BMI: Body mass index is 22.68 kg/m .   Active diet order: Orders Placed This Encounter      Combination Diet Regular Diet Adult; 2 gm NA Diet; Low Saturated Fat Diet      Diet     Output: I/O last 3 completed shifts:  In: 565 [P.O.:360; I.V.:205]  Out: -      Labs:   Recent Labs   Lab 10/02/23  0710   ALBUMIN 3.0*   HGB 10.2*   WBC 13.2*       Pressure injury risk assessment:   Sensory Perception: 4-->no impairment  Moisture: 3-->occasionally moist  Activity: 3-->walks occasionally  Mobility: 3-->slightly limited  Nutrition: 3-->adequate  Friction and Shear: 2-->potential problem  Jasen Score: 18    NEYMAR Guillermo RN CWOCN  Pager no longer in use, please contact through [employer] group: [facility] [employer]

## 2023-10-02 NOTE — PLAN OF CARE
Problem: Plan of Care - These are the overarching goals to be used throughout the patient stay.    Goal: Absence of Hospital-Acquired Illness or Injury  Intervention: Identify and Manage Fall Risk  Recent Flowsheet Documentation  Taken 10/2/2023 1100 by Kaylee Wilson RN  Safety Promotion/Fall Prevention: safety round/check completed  Taken 10/2/2023 0930 by Kaylee Wilson RN  Safety Promotion/Fall Prevention:   activity supervised   assistive device/personal items within reach   clutter free environment maintained   lighting adjusted   nonskid shoes/slippers when out of bed   safety round/check completed  Taken 10/2/2023 0701 by Kaylee Wilson RN  Safety Promotion/Fall Prevention: safety round/check completed     Problem: Plan of Care - These are the overarching goals to be used throughout the patient stay.    Goal: Absence of Hospital-Acquired Illness or Injury  Intervention: Prevent Skin Injury  Recent Flowsheet Documentation  Taken 10/2/2023 0930 by Kaylee Wilson RN  Body Position: position changed independently     Patient A&O, VSS, and CMS intact. On 3L oxygen during the day, which is baseline for pt at home. Up to 5L when walking. Denies pain throughout shift. Up with a of 1 and walker. Congested cough present. IV abx administered today, switched to oral antibiotics. Tele discontinued. Wounds on legs closed, open to air. Possible discharge tomorrow.  Kaylee Wilson RN

## 2023-10-02 NOTE — PROGRESS NOTES
[facility] Hematology and Oncology Progress Note    Patient: Irene León  MRN: [id number]  Date of Service: 1/21/2020      Reason for Visit    Chief Complaint   Patient presents with     HE Cancer     Breast Cancer       Assessment and Plan  Cancer Staging  Malignant neoplasm of central portion of left female breast (H)  Staging form: Breast, AJCC 7th Edition  - Pathologic stage from 7/26/2017: Stage IA (T1b, N0, cM0) - Signed by Devon Suarez MD on 8/1/2017  ER Status: Positive  IL Status: Positive  HER2 Status: Negative      ECOG Performance   ECOG Performance Status: 1     Distress Assessment  Distress Assessment Score: No distress    Pain  Currently in Pain: Yes  Pain Score (Initial OR Reassessment): 7  Location: lower back    #.  Invasive ductal carcinoma of the left breast.  Stage IA (pT1b, pN0 (sn),cM0). grade 1, associated DCIS, ER/IL positive, HER-2 negative, s/p left breast lumpectomy and left axillary sentinel lymph node biopsy. Right breast atypical ductal hyperplasia s/p right breast excisional biopsy on 6/20/2017.  She has several comorbidities including nonischemic cardiomyopathy (EF 40% in 3/17), osteoporosis on treatment with oral bisphosphonate, factor V Leiden mutation (details unknown). Started Tamoxifen in 12/2017.      There is no clear clinical evidence of breast cancer recurrence.  She tolerates tamoxifen fairly well with manageable hot flashes.  No evidence of arterial or venous thrombosis.  Patient is comfortable continuing tamoxifen at this point.     Follow-up clinical exam in 6 months.     And year surveillance mammogram in May 2020.      #. Right breast skin dimpling at prior excisional biopsy site   Continue to follow clinically.      #.  Hot flushes   Venlafaxine at 150 mg daily.  Restart gabapentin 300 mg at bedtime.  She is advised to call me sooner if her hot flushes are not manageable.     #.  Mild hypoxia and chronic cough.   She is closely follow with pulmonologist at  You are having increased secretions.  Return if you have difficulty swallowing, breathing, fevers, chills, or any concerns.   [facility].    #. Hypertension, controlled.     #.  Osteoporosis  #.  COPD  #.  Idiopathic cardiomyopathy- LVEF 40% in October 2018, no change from prior.     Problem List    1. Encounter for screening mammogram for breast cancer  Mammo Screening Bilateral   2. Malignant neoplasm of central portion of left breast in female, estrogen receptor positive (H)        ______________________________________________________________________________    Diagnosis  6/20/17  Stage IA (pT1b, pN0(sn), cM0) invasive ductal carcinoma of the left breast  - ER (high positive, 98%), MA (high positive, 97%) HER2 (negative, score of 1+ by IHC)  - Leena grade 1  - Tumor size: <1 cm  - Margin-negative on final margin with reexcision for invasive carcinoma but close margin for DCIS of 0.2 cm from new medial margin.    - 1 sentinel lymph node removed, negative for carcinoma  - associated DCIS  - Right breast atypical ductal hyperplasia.    - Oncotype DX recurrence score 6 : 10 year risk of recurrence with 5 year of tamoxifen is 5%.    Therapy to date:  6/20/17 -right breast excisional biopsy AND left breast lumpectomy, left axillary sentinel lymph node biopsy  6/30/17-reexcision lumpectomy of the left breast  9/8/17-completed adjuvant radiation to the left breast 4256 cGy/16  12/6/17-initiated adjuvant endocrine therapy with tamoxifen.    History of Present Illness    Stephanie present herself today.  She has lost 9 pounds for the few months somewhat is intentional.  She has ongoing shortness of breath and pulmonary issue.  She has hot flashes/night sweats on tamoxifen but manageable with the use of Effexor.  Denies any new bone pain.  No headaches.  She has chronic low back pain.    Pain Status  Currently in Pain: Yes    Review of Systems    Constitutional  Constitutional (WDL): Exceptions to WDL  Fatigue: None  Fever: None  Chills: None  Weight Gain: None  Weight Loss: to <10% from baseline, intervention not indicated(9#  1/7/20)  Neurosensory  Neurosensory (WDL): All neurosensory elements are within defined limits  Eye   Eye Disorder (WDL): All eye disorder elements are within defined limits  Ear  Ear Disorder (WDL): All ear disorder elements are within defined limits  Cardiovascular  Cardiovascular (WDL): Exceptions to WDL  Palpitations: None  Edema: Yes  Edema Limbs: 5 - 10% inter-limb discrepancy in volume or circumference at point of greatest visible difference, swelling or obscuration of anatomic architecture on close inspection(bilat LE)  Phlebitis: None  Superficial thrombophlebitis: None  Pulmonary  Respiratory (WDL): Exceptions to WDL  Cough: Mild symptoms, nonprescription intervention indicated(loose)  Dyspnea: Shortness of breath with minimal exertion, limiting instrumental ADL  Hypoxia: Decreased oxygen saturation with exercise (e.g., pulse oximeter <88%), intermittent supplemental oxygen  Gastrointestinal  Gastrointestinal (WDL): Exceptions to WDL  Constipation: None  Diarrhea: None  Dysphagia: None  Esophagitis: Asymptomatic, clinical or diagnostic observations only, intervention not indicated(meds control)  Nausea: None  Pharyngitis: None  Vomiting: None  Dysgeusia: None  Dry Mouth: None  Genitourinary  Genitourinary (WDL): All genitourinary elements are within defined limits  Lymphatic  Lymph (WDL): All lymph disorder elements are within defined limits  Musculoskeletal and Connective Tissue  Musculoskeletal and Connetive Tissue Disorders (WDL): Exceptions to WDL  Arthralgia: None  Bone Pain: None  Muscle Weakness : Symptomatic, perceived by patient but not evident on physical exam  Myalgia: Severe pain, limiting self care ADL(lower back )  Integumentary  Integumentary (WDL): All integumentary elements are within defined limits  Patient Coping  Patient Coping: Accepting;Open/discussion  Distress Assessment  Distress Assessment Score: No distress  Accompanied by  Accompanied by: Alone( dropped off)  Oral Chemo  Adherence       Past History  Past Medical History:   Diagnosis Date     Arrhythmia      Breast cancer (H) 2017     CHF (congestive heart failure) (H)      COPD (chronic obstructive pulmonary disease) (H)      Coronary artery disease      GERD (gastroesophageal reflux disease)      Hyperlipidemia      Hypertension      Idiopathic cardiomyopathy (H)        Physical Exam    Recent Vitals 1/21/2020   Height -   Weight -   BSA (m2) -   BP -   Pulse -   Temp -   Temp src -   SpO2 95   Some recent data might be hidden     General: alert, awake, not in acute distress  HEENT: Head: Normal, normocephalic, atraumatic.  Eye: Normal external eye, conjunctiva, lids cornea, CATINA.  Ears:  Non-tender.  Nose: Normal external nose, mucus membranes and septum.  Pharynx: Dental Hygiene adequate. Normal buccal mucosa. Normal pharynx.  Neck / Thyroid: Supple, no masses, nodes, nodules or enlargement.  Lymphatics: No abnormally enlarged lymph nodes.  Chest: Normal chest wall and respirations. Clear to auscultation.  Breasts: Postsurgical scar with tissue defect in the inner lower quadrant of the right breast.  No palpable masses.  Heart: S1 S2 RRR, no murmur.   Abdomen: abdomen is soft without significant tenderness, masses, organomegaly or guarding  Extremities: normal strength, tone, and muscle mass  Skin: normal. no rash or abnormalities  CNS: non focal.      Lab Results    Recent Results (from the past 168 hour(s))   Basic Metabolic Panel   Result Value Ref Range    Sodium 142 136 - 145 mmol/L    Potassium 4.1 3.5 - 5.0 mmol/L    Chloride 107 98 - 107 mmol/L    CO2 24 22 - 31 mmol/L    Anion Gap, Calculation 11 5 - 18 mmol/L    Glucose 81 70 - 125 mg/dL    Calcium 9.3 8.5 - 10.5 mg/dL    BUN 13 8 - 28 mg/dL    Creatinine 1.24 (H) 0.60 - 1.10 mg/dL    GFR MDRD Af Amer 51 (L) >60 mL/min/1.73m2    GFR MDRD Non Af Amer 42 (L) >60 mL/min/1.73m2       Imaging    No results found.    Signed by: Devon Suarez MD

## 2023-10-02 NOTE — PROGRESS NOTES
[facility]  Palliative Care Daily Progress Note       Recommendations & Counseling       GOALS OF CARE:   Currently goals are life prolonging with limits-DNR/DNI.    Waiting to speak with oncology with daughter present before making any decisions/changes to care plan.  Discussed with Dr. Suarez and she will plan to see this afternoon. Daughter notified.   Stephanie has a strong merline and identifies as Spiritism.   Palliative care will continue to follow.  Recommend to continue to follow with outpatient palliative care if not signing on with hospice.     ADVANCE CARE PLANNING:  No health care directive on file.    POLST on file.  Currently POLST documents DNR and selective medical treatments.  Code status: No CPR- Do NOT Intubate     MEDICAL MANAGEMENT:   # Shortness of breath secondary to advanced COPD with acute exacerbation, CHF-improving  -Currently managed by primary team and cardiology     #Generalized weakness and fatigue secondary to the above   -PT/OT following, recommend TCU     #history of Chronic pain 2/2 compression fracture  -Appreciate pain team recommendations     #Insomnia  Continues with zyprexa 5 mg at HS and melatonin 10 mg at HS PRN  -Would use caution with adding any other medications that can prolong QTC.  Would avoid benzodiazepine due to potential adverse effects.  If goals of care transition to comfort focused, then weigh risk vs benefit.          Discussed with: Dr. Suarez, Kaylee RN      Assessments          Irene CARY Mau is a 78 year old female with history of nonischemic cardiomyopathy LVEF 20-25%, chronic heart failure with reduced ejection fraction, minimal coronary disease per angiogram, paroxysmal atrial fibrillation, lung cancer, COPD, chronic hypoxic respiratory failure on 3 L of home O2, chronic kidney disease who presented to the ED on 9/23/23 with shortness of breath.  Admitted to the hospital with acute on chronic hypoxic respiratory failure, COPD exacerbation,  CHF with EF 20-25%.  Cardiology following, patient currently on IV bumex but held due to hypotension.  Oxygen needs are 5 lpm which is higher than her home baseline.      Recurrent hospitalizations-this is her 5th admission since 6/2023.   She is followed in outpatient palliative care clinic, last seen on 8/15/23.          Today, the patient was seen for:  Goals of care and symptom management, emotional support    .  Mood, coping, and/or meaning in the context of serious illness were addressed today: Yes.              Interval History:     Chart review/discussion with unit or clinical team members:   Chart reviewed.  Imaging with new adrenal nodules concerning for metastasis, right adrenal hemorrhage.  Oncology consulted on 9/30.  Recommended pain control, avoid NSAIDs, and primary oncologist to see today.  Noted no good options for lung cancer as she did not tolerate Sotorasil and not a candidate for chemotherapy due to poor performance status and multiple comorbid conditions. May consider Sotorasil at lower dose.    Pain team following. Minimal use of opioid medication.     Per patient or family/caregivers today:  Daughter and sister present. Patient reports pain is well-managed.  Awaiting further recommendations before potentially pursuing adrenal biopsy.  Much of discussion was focused on frustration around daughter being able to talk directly with oncology.  Once they can have discussion with oncology, interested in further discussions with palliative care.      Key Palliative Symptoms:  # Pain severity the last 12 hours: low  # Dyspnea severity the last 12 hours: none  # Nausea severity the last 12 hours: none  # Anxiety severity the last 12 hours: low               Review of Systems:     Besides above, an additional 4 point system ROS was reviewed and is unremarkable          Medications:     I have reviewed this patient's medication profile and medications during this hospitalization.             Physical  Exam:   Vital Signs: Blood pressure 93/56, pulse 79, temperature 97.9  F (36.6  C), temperature source Oral, resp. rate 18, weight 51 kg (112 lb 8 oz), SpO2 94 %, not currently breastfeeding.   GENERAL: Alert, sitting up in chair, dressed in street clothes, NAD    SKIN: Warm and dry   LUNGS: breathing non-labored   NEUROLOGIC: Alert and oriented X 4               Data Reviewed:         Results for orders placed or performed during the hospital encounter of 09/23/23   XR Chest Port 1 View    Impression    IMPRESSION: Heart size is stable. Right midlung nodule measuring 2.5 cm, unchanged. Decrease in linear parenchymal opacity in the left midlung. Chronic interstitial scarring/fibrosis bilaterally. There is some increasing pleural thickening in the left   upper lobe. No pneumothorax. Old right rib fracture.   XR Foot Port Right 2 Views    Impression    IMPRESSION: The right foot is negative for fracture. There is significant soft tissue swelling along the dorsal aspect of the foot. Cellulitis could also have this appearance.   XR Chest Port 1 View    Impression    IMPRESSION: No interval change given shallower inspiration. The extensive emphysema is better appreciated on the CT. Known, inferior right upper lobe pulmonary nodule again noted and overlies the right posterior seventh rib. Bandlike fibroatelectasis in   the left midlung and mild bibasilar fibrosis unchanged. No signs of acute pneumonia or failure. Heart and pulmonary vascularity are normal.   CT Chest Pulmonary Embolism w Contrast    Impression    IMPRESSION:  1.  No pulmonary embolus.  2.  New manifestations of aspiration pneumonitis/pneumonia in the right lower lobe with moderate to large volume airway secretions.  3.  Slightly decreased size of the dominant nodule within the right upper lobe.  4.  However, there are a few new or increasing satellite nodules in the right lung. Additionally, interval increase in the size of several of the mediastinal  lymph nodes. Continued attention on follow-up.  5.  Advanced emphysema.   US Abdomen Limited*    Impression    IMPRESSION:    1.  Right upper quadrant mass (possibly adrenal in origin), suspicious for metastatic disease. Recommend contrast enhanced CT abdomen - pelvis.     2.  No cholelithiasis. Gallbladder wall thickness is exaggerated by partial distention of the gallbladder. Accounting for this, no definitive evidence for acute cholecystitis.    3.  Remaining exam unremarkable.     CT Abdomen Pelvis w Contrast    Impression    IMPRESSION:   1.  Bilateral adrenal nodules are new compared to 8/11/2023 and concerning for metastases. There is high attenuation fluid associated with the right adrenal nodule, which is likely hemorrhage and likely accounts for acute right upper quadrant pain.   Hemorrhage extends inferiorly in the retroperitoneum along the right kidney.    2.  Tiny right pleural effusion with associated airspace opacities.       Recent Labs   Lab 10/02/23  0710 10/01/23  0424 09/30/23  0647 09/30/23  0506 09/29/23  0608   WBC 13.2* 13.4* 20.4*  --  22.7*   HGB 10.2* 8.7* 10.4*  --  11.4*   MCV 93 93 93  --  95    175 177  --  215   * 144  --  143 139   POTASSIUM 3.8 3.7  --  4.3 4.4   CHLORIDE 111* 110*  --  108* 106   CO2 28 28  --  26 23   BUN 36.1* 35.8*  --  35.1* 33.4*   CR 1.73* 1.71*  --  1.62* 1.54*   ANIONGAP 8 6*  --  9 10   MESSI 8.8 8.2*  --  8.6* 8.9   GLC 77 92  --  85 104*   ALBUMIN 3.0*  --   --   --  2.9*   PROTTOTAL 6.3*  --   --   --  6.6   BILITOTAL 0.3  --   --   --  0.2   ALKPHOS 36  --   --   --  50   ALT <5  --   --   --  8   AST 24  --   --   --  35      Lab Results   Component Value Date    WBC 13.2 (H) 10/02/2023    HGB 10.2 (L) 10/02/2023    HCT 33.2 (L) 10/02/2023     10/02/2023     (H) 10/02/2023    POTASSIUM 3.8 10/02/2023    CHLORIDE 111 (H) 10/02/2023    CO2 28 10/02/2023    BUN 36.1 (H) 10/02/2023    CR 1.73 (H) 10/02/2023    GLC 77 10/02/2023     NTBNP 6,037 (H) 09/23/2023    AST 24 10/02/2023    ALT <5 10/02/2023    ALKPHOS 36 10/02/2023    BILITOTAL 0.3 10/02/2023    INR 1.05 08/27/2023      Lab Results   Component Value Date    ALBUMIN 3.0 10/02/2023    ALBUMIN 3.1 08/27/2023                    ====================================================  TT: I have personally spent a total of 50 minutes on the day of the encounter on the unit in review of medical record, consultation with the medical providers and assessment of patient today, with time spent in counseling, coordination of care, and discussion with patient and family re: diagnostic results,  symptom management, and development of plan of care as noted above.      ====================================================    Sahara Mercedes, CNS  [facility], Palliative Care  Securely message with the [employer] Web Console (learn more here) or  Text page via [employer] Paging/Directory

## 2023-10-02 NOTE — PROGRESS NOTES
[facility]    Progress Note - Hospitalist Service       Date of Admission:  9/23/2023    Assessment & Plan   Irene León is a 78 year old female admitted on 9/23/2023. She has a history of CHF with reduced ejection fraction (most recently EF 20-25% on 6/22), cardiomyopathy, severe COPD with chronic home oxygen 3 L at rest and 5-6 L with exertion, CAD, paroxysmal A-fib on Xarelto, CKD stage IV, lung cancer on oral chemo and breast cancer on oral chemo who is admitted for acute hypoxic respiratory failure likely multifactorial related to COPD and CHF. 9/28/23 showing new onset aspiration pneumonitis/pneumonia in the right lower lobe with moderate to large volume airway secretions; discharge was delayed, Zosyn was started. 9/29/23 CT showing new finding of bilateral adrenal nodules concerning for metastases, with likely hemorrhage.      R sided chest pain   New R. Adrenal Mass, hemorrhage  Invasive ductal carcinoma of the breast  right upper lobe pulmonary lung cancer  Acute onset R chest pain 9/29/23. EKG, CXR, RUQ were obtained and unremarkable. CTPE did show new aspiration pneumonia. CT abdomen/pelvis which showed right adrenal nodule hemorrhage, concern for metastasis in the setting of known invasive ductal carcinoma of the breast and right upper lobe pulmonary lung cancer.  - Pain team consulted, appreciate recs              -lidocaine patch x 2 daily              -discontinue Toradol               -decrease gabapentin to 300 mg at bedtime given risk of worsening peripheral edema              -scheduled APAP 975 mg TID              -hydroxyzine 50 mg TID PRN              -Dilaudid 1-2 mg PO q4hr PRN first line, Dilaudid 0.2 mg IV q2hr PRN second line              -add senna/doc x 1 PO BID, MiraLAX PRN, bisacodyl PRN  -Hemonc consulted; appreciate recommendations  -Pain control  -Avoid NSAIDs  -Continue Xarelto  -Dr Suarez will see Monday; currently pending at the time of this  dictation  - General Surgery consult recommendations:   -Recommend conservative management, serial CBC, consider discontinuing xarelto, holding toradol if concern for continued bleeding  -If evidence of continued bleeding despite conservative measures, could consider IR intervention, however thorough discussion of GOC would be encouraged  -Ms. León is not a surgical candidate, and would not tolerate a general anesthetic should she need a surgical intervention.  -signed off   -Palliative team following  - Potential biopsy of adrenal nodule: Patient and daughter will discuss this with Hem/Onc team today. On discussion this morning, they are hesitant to pursue biopsy if it is only for diagnostic and not therapeutic reasons; they will discuss with Hem/Onc if biopsy would  at all.    -Per Radiology, Xarelto would need to be held for 2 days prior to procedure.      Aspiration pneumonia  Leukocytosis, improving   Possible COPD exacerbation  history of severe COPD on home oxygen at 3 LPM. 9/28/23 CT new manifestations of aspiration pneumonitis/pneumonia in the right lower lobe with moderate to large volume airway secretions. Started Zosyn on 9/28/23 PM. Patient reports improvement in cough.  - Discontinue IV Zosyn (9/28-10/2) and switch to Augmentin  - Continue robitussin PRN TID for cough  - Continue PTA inhalers, mucomyst, tessalon perles  - Duonebs and Mucomyst PRN   - S/p solumedrol in ER  -Prednisone taper as follows:   -  5-day course of 20 mg daily (started 9/30/23) followed by 5-day course of 10 mg daily  - Antibiotics to date:               -doxycycline (9/23-9/28)              -ceftriaxone (9/23-9/24)   - Respiratory panel and sputum cultures pending collection as able  - Strict I&O's and daily weights  - Continuous pulse ox   - Supplemental O2 as needed to maintain oxygen saturations between 88%-92%, RRT is on board to manage further  - Hospice/palliative consulted and following, appreciate  recommendations     CHF with reduced EF 20%-25%  Cardiomyopathy  CHF exacerbation   Recent hospitalization for respiratory failure 2/2 CHF. BNP on admission ~6,000 (noted to be 9,600 on recent admission 9/15/23). Troponin elevated on admission to 43 but was 44 two days prior so no significant change. CHF likely contributing to respiratory failure. Switched from Lasix to Bumex on 9/24/23 per Cardiology.  - Cardiology consulted, appreciate recommendations              -Continue bumetanide 1 mg twice daily  -Continue on rivaroxaban for anticoagulation and statin therapy  -Consider outpatient appointment with EP to discuss CRT device.    - Strict I&O's and daily weights   - Continuous pulse ox   - Supplemental O2 as needed to maintain oxygen saturations 88-92% though as above, RRT is on board for further management  - Cardiac tele resumed 9/25/23 in light of possible CHF exacerbation and addition of possible QTC prolonging medication.     CKD stage 4  Cr 1.73 on admission, GFR 30. Baseline Cr approx 1.4-1.6. 1.7 this AM.   -encourage PO intake   - Trend BMP      Lower extremity wounds  - WOC consult placed 9/23/23, appreciate recommendations      Right foot swelling  9/23/23 right foot XR showing that the right foot is negative for fracture, there is significant soft tissue swelling along the dorsal aspect of the foot, cellulitis could also have this appearance. Area of swelling is bruised and raised, appears to be hematoma but will continue to monitor daily for any changes. Tenderness to palpation but no surrounding erythema or warmth, and no fevers.   - Continues to improve on exam and per patient     Insomnia  - PRN melatonin 10 mg at bedtime  - PRN hydroxyzine 25-50 mg daily              -Patient has history of QTC prolongation so will monitor closely with cardiac tele as above which was also started due to possible CHF exacerbation       Loose stools   Per RN, patient reported loose stools and requests imodium.  Medication has increased risk of PT prolongation  -loperamide ordered   -cardiac tele ordered given significant cardiac risk factors as mentioned above     Chronic conditions  Paroxysmal A. Fib: continue PTA DOAC (Xarelto) and PTA metoprolol   Lung cancer: follows with Dr. Suarez at [facility], currently oral chemo medication is on hold due to side effects   Breast cancer: follows with Dr. Suarez at [facility], continue daily PTA tamoxifen        Diet: Combination Diet Regular Diet Adult; 2 gm NA Diet; Low Saturated Fat Diet  Snacks/Supplements Adult: Ensure Enlive; Between Meals  Diet    DVT Prophylaxis: DOAC  Hayes Catheter: Not present  Fluids: PO  Lines: None     Cardiac Monitoring: None  Code Status: No CPR- Do NOT Intubate      Clinically Significant Risk Factors         # Hypernatremia: Highest Na = 147 mmol/L in last 2 days, will monitor as appropriate      # Hypoalbuminemia: Lowest albumin = 2.9 g/dL at 9/29/2023  6:08 AM, will monitor as appropriate       # Hypertension: Noted on problem list    # Chronic heart failure with reduced ejection fraction: last echo with EF <40%           # COPD: noted on problem list          Disposition Plan      Expected Discharge Date: 10/03/2023, 12:00 PM  Discharge Delays: *Medically Ready for Discharge    Discharge Comments: [facility] - Time?  Abdominal CT 9/29        The patient's care was discussed with the Attending Physician, Dr. Franklin .    JOSE MIGUEL LAMAS MD   Hospitalist Service  [facility]  Securely message with [employer] (more info)  Text page via [facility] Paging/Directory   ______________________________________________________________________    Interval History   No acute events overnight. Patient reports feeling well with no pain this morning. She notes persistent cough that has responded well to robitussin but no increasing SOB or chest tightness. She also notes good improvement in foot swelling bilaterally and no further loose stools.  Patient and daughter plan to talk to Hem/Onc today regarding next steps. They continue to be in agreement with TCU at discharge which may be as early as tomorrow.    Physical Exam   Vital Signs: Temp: 98  F (36.7  C) Temp src: Oral BP: (!) 176/74 Pulse: 76   Resp: 18 SpO2: 96 % O2 Device: Nasal cannula Oxygen Delivery: 3 LPM  Weight: 112 lbs 8 oz    Constitutional: awake, alert, comfortable-appearing, sitting up in chair, no acute distress  HEENT: Lids and lashes normal, sclera clear, conjunctiva normal. Normocephalic and atraumatic. No rhinorrhea. Moist mucus membranes.   Respiratory: Patient breathing comfortably on 4L O2 via oxymask, no increased work of breathing or respiratory distress.  Cardiovascular: Skin is warm and well-perfused.  Skin: Bruising throughout extremities. Several chronic-appearing scabs/lesions and dry skin throughout bilateral lower extremities that appear to be well-healing. Raised, bruised area on dorsal aspect of patient's right foot that is tender to palpation but has no surrounding erythema or warmth with significantly improved swelling.  Musculoskeletal: Trace pitting edema of bilateral lower extremities.  Neurologic: Awake, alert, oriented to name, place and time. No apparent focal deficits.    Data     I have personally reviewed the following data over the past 24 hrs:    13.2 (H)  \   10.2 (L)   / 218     147 (H) 111 (H) 36.1 (H) /  77   3.8 28 1.73 (H) \     ALT: <5 AST: 24 AP: 36 TBILI: 0.3   ALB: 3.0 (L) TOT PROTEIN: 6.3 (L) LIPASE: N/A       Imaging results reviewed over the past 24 hrs:   No results found for this or any previous visit (from the past 24 hour(s)).

## 2023-10-02 NOTE — PLAN OF CARE
Problem: COPD (Chronic Obstructive Pulmonary Disease)  Goal: Effective Oxygenation and Ventilation  Intervention: Promote Airway Secretion Clearance  Recent Flowsheet Documentation  Taken 10/2/2023 0039 by Della Interiano RN  Cough And Deep Breathing: done independently per patient   Goal Outcome Evaluation:  Patient is alert and oriented X 4. Denied pain. Utilized Albuterol inhaler for C/O SOB. Up to the bathroom with A1 and voided. IV infusing. VSS on 4L 02 via NC. Call light within reach.

## 2023-10-02 NOTE — PROGRESS NOTES
[facility] Inpatient Progress Note    Patient: Irene León  MRN: [id number]  Date of Service: 10/2/2023          Reason for Visit    #.     Assessment and Plan    Cancer Staging   Invasive ductal carcinoma of breast, female, left (H)  Staging form: Breast, AJCC 8th Edition  - Pathologic stage from 6/20/2017: Stage IA (pT1b, pN0(sn), cM0, G1, ER+, NY+, HER2-, Oncotype DX score: 6) - Signed by Devon Suarez MD on 7/4/2022      ECOG Performance Status: 3    #.  Right upper lobe lung cancer with adjacent satellite nodule and thoracic lymph node metastasis, K-suzi G12 C mutated.  #.  New bilateral adrenal nodule concerning for metastatic lesions.  #.  History of invasive ductal carcinoma of the left breast, stage Ia in 2017.  ER/NY positive, HER2 negative.  Postsurgery, followed by 5 years of adjuvant tamoxifen.  #.  Severe COPD and chronic hypoxic respiratory failure  #.  Congestive heart failure with reduced EF   #. CKD- 3b  #. Poor performance status  #.  Mannyvantenisha     I reviewed the CT scan of abdomen/pelvis from 9/22/2023.  She has developed new bilateral adrenal nodules concerning for metastatic disease.  I discussed that adrenal gland is the potential site of lung cancer metastasis although breast cancer is not entirely ruled out.  I recommended biopsy of the adrenal nodule to confirm pathology.  She will need to hold anticoagulant for 3 days per radiology.   We discussed about potential diagnosis, and outcome.  She clearly not a good candidate for intensive chemoimmunotherapy. She started sotorasib 960 mg daily on 9/1/2023.  It has been held since 9/22/2023 due to confusion/delirium, diarrhea and fluid retention.  She has very complex medical issues that congestive heart failure might be playing a role for fluid retention.  At this point, she is feeling well at her baseline health.  I recommended to resume sotorasib but recommended to decrease the dose to 720 mg (6 tablets) from  9060 mg (8 tablets) daily.  Her daughter will bring the medication from home.  She will be monitor the side effects while she is in the hospital for the next few days.   I appreciate palliative care involvement.  Stephanie is interested in pursuing further investigation and potential treatment as indicated at this point.  I agree that she will benefit from continued follow-up with palliative care.      Discussed with Sahara Mercedes from palliative care.  ______________________________________________________________________________    History of Present Illness    Her sister and daughter were present at bedside.  Ms. Irene León reported that she is feeling at her baseline at this point.  She is on 3 L of oxygen by nasal cannula.  She wants to know about new findings in adrenal gland and know the next step in management.    Her  suddenly passed away last week which put her and her children had tremendous stress on them.      Review of Systems    Apart from describing in HPI, the remainder of comprehensive ROS was negative.    Physical Exam    BP 93/56 (BP Location: Right arm)   Pulse 79   Temp 97.9  F (36.6  C) (Oral)   Resp 18   Wt 51 kg (112 lb 8 oz)   SpO2 94%   BMI 22.68 kg/m        General: alert, awake, not in acute distress, frail-appearing female.  HEENT: Head: Normal, normocephalic, atraumatic.  Eye: Normal external eye, conjunctiva, lids cornea, CATINA.  Nose: Normal external nose, mucus membranes and septum.  Pharynx: Normal buccal mucosa. Normal pharynx.  Neck / Thyroid: Supple, no masses, nodes, nodules or enlargement.  Lymphatics: No abnormally enlarged lymph nodes.  Chest: Normal chest wall and respirations. Clear to auscultation.  Extremities: normal strength, tone, and muscle mass  Skin: normal. no rash or abnormalities  CNS: non focal.     Lab Results    Recent Results (from the past 24 hour(s))   Comprehensive metabolic panel    Collection Time: 10/02/23  7:10 AM   Result Value Ref  Range    Sodium 147 (H) 135 - 145 mmol/L    Potassium 3.8 3.4 - 5.3 mmol/L    Carbon Dioxide (CO2) 28 22 - 29 mmol/L    Anion Gap 8 7 - 15 mmol/L    Urea Nitrogen 36.1 (H) 8.0 - 23.0 mg/dL    Creatinine 1.73 (H) 0.51 - 0.95 mg/dL    GFR Estimate 30 (L) >60 mL/min/1.73m2    Calcium 8.8 8.8 - 10.2 mg/dL    Chloride 111 (H) 98 - 107 mmol/L    Glucose 77 70 - 99 mg/dL    Alkaline Phosphatase 36 35 - 104 U/L    AST 24 0 - 45 U/L    ALT <5 0 - 50 U/L    Protein Total 6.3 (L) 6.4 - 8.3 g/dL    Albumin 3.0 (L) 3.5 - 5.2 g/dL    Bilirubin Total 0.3 <=1.2 mg/dL   CBC with platelets and differential    Collection Time: 10/02/23  7:10 AM   Result Value Ref Range    WBC Count 13.2 (H) 4.0 - 11.0 10e3/uL    RBC Count 3.58 (L) 3.80 - 5.20 10e6/uL    Hemoglobin 10.2 (L) 11.7 - 15.7 g/dL    Hematocrit 33.2 (L) 35.0 - 47.0 %    MCV 93 78 - 100 fL    MCH 28.5 26.5 - 33.0 pg    MCHC 30.7 (L) 31.5 - 36.5 g/dL    RDW 14.6 10.0 - 15.0 %    Platelet Count 218 150 - 450 10e3/uL    % Neutrophils 68 %    % Lymphocytes 15 %    % Monocytes 11 %    % Eosinophils 4 %    % Basophils 1 %    % Immature Granulocytes 1 %    NRBCs per 100 WBC 0 <1 /100    Absolute Neutrophils 9.0 (H) 1.6 - 8.3 10e3/uL    Absolute Lymphocytes 2.0 0.8 - 5.3 10e3/uL    Absolute Monocytes 1.5 (H) 0.0 - 1.3 10e3/uL    Absolute Eosinophils 0.5 0.0 - 0.7 10e3/uL    Absolute Basophils 0.1 0.0 - 0.2 10e3/uL    Absolute Immature Granulocytes 0.2 <=0.4 10e3/uL    Absolute NRBCs 0.0 10e3/uL        Imaging    No results found.     Signed by: Devon Suarez MD

## 2023-10-02 NOTE — CONSULTS
Bilateral adrenal masses likely mets. The right adrenal is complicated by hemorrhage. Therefore the smaller left adrenal will likely have a high biopsy yield. Will attempt biopsy after xarelto dc'ed

## 2023-10-03 NOTE — PROGRESS NOTES
PALLIATIVE CARE SOCIAL WORK Progress Note   Location: [facility]      PCSW visit with Pt for check in and support. Visited with Pt about how she is feeling and coping. She reflected on recent and sudden loss of spouse and her medical concerns. She said it has been a lot to handle, but she feels like she is coping ok. She reflected on the support from her daughter and how grateful she is for this. She talked about her other family and the ways they bring her mel and support.   Provided active listening and emotional support around grief and loss and illness.      Plan: PCSW is available for support.     Clinical Social Work Interventions:   Facilitation of processing of thoughts/feelings    Bella Fischer [facility]  [facility], Palliative Care  Securely message with the [employer] (learn more here) or  Text page via [employer] Paging/Directory

## 2023-10-03 NOTE — CARE PLAN
Patient c/o minimal pain today. Tylenol given for discomfort. Tolerating meals. O2 @ 3L. Loose stool x 1. Imodium given. Daughter at bedside. Informed by BFM team of plan for biopsy. Requested to speak with care manager. Message given to care manager.

## 2023-10-03 NOTE — PROGRESS NOTES
[facility]  Palliative Care Daily Progress Note       Recommendations & Counseling       GOALS OF CARE:   Currently goals are life prolonging with limits-DNR/DNI.    Patient spoke with oncology with daughter present.  Planning for adrenal node bx, restarting oral chemo at lower dose.   Stephanie has a strong merline and identifies as Religion.   Palliative care will continue to follow.  Recommend to continue to follow with outpatient palliative care if not signing on with hospice.     ADVANCE CARE PLANNING:  No health care directive on file.    POLST on file.  Currently POLST documents DNR and selective medical treatments.  Code status: No CPR- Do NOT Intubate     MEDICAL MANAGEMENT:   # Shortness of breath secondary to advanced COPD with acute exacerbation, CHF-improving  -Currently managed by primary team and cardiology     #Generalized weakness and fatigue secondary to the above   -PT/OT following, recommend TCU     #history of Chronic pain 2/2 compression fracture  -Appreciate pain team recommendations.  Agree with transition to tramadol 25-50 mg po every 8 hours PRN, and tylenol PRN.       #Insomnia  Continues with zyprexa 5 mg at HS and melatonin 10 mg at HS PRN  -Would use caution with adding any other medications that can prolong QTC.  Would avoid benzodiazepine due to potential adverse effects.  If goals of care transition to comfort focused, then weigh risk vs benefit.          Discussed with: Joyce pain team pharmacist      Assessments          Irene León is a 78 year old female with history of nonischemic cardiomyopathy LVEF 20-25%, chronic heart failure with reduced ejection fraction, minimal coronary disease per angiogram, paroxysmal atrial fibrillation, lung cancer, COPD, chronic hypoxic respiratory failure on 3 L of home O2, chronic kidney disease who presented to the ED on 9/23/23 with shortness of breath.  Admitted to the hospital with acute on chronic hypoxic respiratory  "failure, COPD exacerbation, CHF with EF 20-25%.  Cardiology following, patient currently on IV bumex but held due to hypotension.  Oxygen needs are 5 lpm which is higher than her home baseline.      Recurrent hospitalizations-this is her 5th admission since 6/2023.   She is followed in outpatient palliative care clinic, last seen on 8/15/23.          Today, the patient was seen for:  Goals of care and symptom management, emotional support    .  Mood, coping, and/or meaning in the context of serious illness were addressed today: Yes.              Interval History:     Chart review/discussion with unit or clinical team members:   Oncology notes reviewed, plan for adrenal node bx and resumption of oral chemo at reduced dose.     Per patient or family/caregivers today:  Minimal pain.  States tramadol works best, \"with a tylenol chaser.\"  No other complaints.  Understands and agrees with plan for biopsy and chemo at reduced dose.     Key Palliative Symptoms:  # Pain severity the last 12 hours: low  # Dyspnea severity the last 12 hours: none  # Nausea severity the last 12 hours: none  # Anxiety severity the last 12 hours: low               Review of Systems:     Besides above, an additional 4 point system ROS was reviewed and is unremarkable          Medications:     I have reviewed this patient's medication profile and medications during this hospitalization.             Physical Exam:   Vital Signs: Blood pressure 128/64, pulse 81, temperature 97.7  F (36.5  C), temperature source Oral, resp. rate 18, weight 51 kg (112 lb 8 oz), SpO2 94 %, not currently breastfeeding.   GENERAL: Alert, sitting up in chair, dressed in street clothes, NAD    SKIN: Warm and dry   LUNGS: breathing non-labored   NEUROLOGIC: Alert and oriented X 4               Data Reviewed:         Results for orders placed or performed during the hospital encounter of 09/23/23   XR Chest Port 1 View    Impression    IMPRESSION: Heart size is stable. Right " midlung nodule measuring 2.5 cm, unchanged. Decrease in linear parenchymal opacity in the left midlung. Chronic interstitial scarring/fibrosis bilaterally. There is some increasing pleural thickening in the left   upper lobe. No pneumothorax. Old right rib fracture.   XR Foot Port Right 2 Views    Impression    IMPRESSION: The right foot is negative for fracture. There is significant soft tissue swelling along the dorsal aspect of the foot. Cellulitis could also have this appearance.   XR Chest Port 1 View    Impression    IMPRESSION: No interval change given shallower inspiration. The extensive emphysema is better appreciated on the CT. Known, inferior right upper lobe pulmonary nodule again noted and overlies the right posterior seventh rib. Bandlike fibroatelectasis in   the left midlung and mild bibasilar fibrosis unchanged. No signs of acute pneumonia or failure. Heart and pulmonary vascularity are normal.   CT Chest Pulmonary Embolism w Contrast    Impression    IMPRESSION:  1.  No pulmonary embolus.  2.  New manifestations of aspiration pneumonitis/pneumonia in the right lower lobe with moderate to large volume airway secretions.  3.  Slightly decreased size of the dominant nodule within the right upper lobe.  4.  However, there are a few new or increasing satellite nodules in the right lung. Additionally, interval increase in the size of several of the mediastinal lymph nodes. Continued attention on follow-up.  5.  Advanced emphysema.   US Abdomen Limited*    Impression    IMPRESSION:    1.  Right upper quadrant mass (possibly adrenal in origin), suspicious for metastatic disease. Recommend contrast enhanced CT abdomen - pelvis.     2.  No cholelithiasis. Gallbladder wall thickness is exaggerated by partial distention of the gallbladder. Accounting for this, no definitive evidence for acute cholecystitis.    3.  Remaining exam unremarkable.     CT Abdomen Pelvis w Contrast    Impression    IMPRESSION:   1.   Bilateral adrenal nodules are new compared to 8/11/2023 and concerning for metastases. There is high attenuation fluid associated with the right adrenal nodule, which is likely hemorrhage and likely accounts for acute right upper quadrant pain.   Hemorrhage extends inferiorly in the retroperitoneum along the right kidney.    2.  Tiny right pleural effusion with associated airspace opacities.       Recent Labs   Lab 10/03/23  0533 10/02/23  0710 10/01/23  0424 09/30/23  0506 09/29/23  0608   WBC 13.0* 13.2* 13.4*   < > 22.7*   HGB 9.2* 10.2* 8.7*   < > 11.4*   MCV 93 93 93   < > 95    218 175   < > 215    147* 144   < > 139   POTASSIUM 3.3* 3.8 3.7   < > 4.4   CHLORIDE 110* 111* 110*   < > 106   CO2 27 28 28   < > 23   BUN 30.3* 36.1* 35.8*   < > 33.4*   CR 1.43* 1.73* 1.71*   < > 1.54*   ANIONGAP 7 8 6*   < > 10   MESSI 8.1* 8.8 8.2*   < > 8.9   GLC 75 77 92   < > 104*   ALBUMIN  --  3.0*  --   --  2.9*   PROTTOTAL  --  6.3*  --   --  6.6   BILITOTAL  --  0.3  --   --  0.2   ALKPHOS  --  36  --   --  50   ALT  --  <5  --   --  8   AST  --  24  --   --  35    < > = values in this interval not displayed.        Lab Results   Component Value Date    WBC 13.0 (H) 10/03/2023    HGB 9.2 (L) 10/03/2023    HCT 30.0 (L) 10/03/2023     10/03/2023     10/03/2023    POTASSIUM 3.3 (L) 10/03/2023    CHLORIDE 110 (H) 10/03/2023    CO2 27 10/03/2023    BUN 30.3 (H) 10/03/2023    CR 1.43 (H) 10/03/2023    GLC 75 10/03/2023    NTBNP 6,037 (H) 09/23/2023    AST 24 10/02/2023    ALT <5 10/02/2023    ALKPHOS 36 10/02/2023    BILITOTAL 0.3 10/02/2023    INR 1.05 08/27/2023      Lab Results   Component Value Date    ALBUMIN 3.0 10/02/2023    ALBUMIN 3.1 08/27/2023                    ====================================================  TT: I have personally spent a total of 35 minutes on the day of the encounter on the unit in review of medical record, consultation with the medical providers and assessment of  patient today, with time spent in counseling, coordination of care, and discussion with patient and family re: diagnostic results,  symptom management, and development of plan of care as noted above.      ====================================================    Sahara Mercedes, CNS  [facility], Palliative Care  Securely message with the [employer] Web Console (learn more here) or  Text page via [employer] Paging/Directory

## 2023-10-03 NOTE — PROGRESS NOTES
[facility] ACUTE PAIN SERVICE    ([facility], [facility], [facility])  Daily PAIN Progress Note    Assessment/Plan:  Irene León is a 78 year old female who was admitted on 9/23/2023.  Pain team was asked to see the patient for acute onset severe pain - unclear etiology. History of CHF with reduced ejection fraction (most recently EF 20-25% on 6/22), cardiomyopathy, severe COPD with chronic home oxygen 3 L at rest and 5-6 L with exertion, CAD, paroxysmal A-fib on Xarelto, CKD stage IV, lung cancer on oral chemo and breast cancer on oral chemo who is admitted for acute hypoxic respiratory failure. She is followed in outpatient palliative care clinic, last seen on 8/15/23. The patient is a former smoker and denies chemical dependency history.      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   Today again, patient reporting pain is minimal, under right breast, in the last 24 hours, has used only one dose of  Dilaudid 1 mg, patient reporting she would prefer to be on tramadol(was taking at home).  I will confer with Palliative Care, but I think low dosing tramadol would be appropriate. Patient not sedated or confused. Patient reports not as frequent loose stools. Discussed also with Palliative Care.     Discussed changing Dilaudid 1 mg to tramadol 25-50 mg q 8 h prn with Shanda Mercedes, Palliative Care Clinical Nurse Specialist.     PLAN:   1) Pain is consistent with unknown etiology - acute/sever. Cardiology consulted and signed off  2)Multimodal Medication Therapy  Topical: add lidocaine patch x 2 daily   NSAID'S: none d/t crcl = 26 ml/min and age and on anticoagulation and steroids  and CHF -  Steroids: prednisone 20 mg po daily   Muscle Relaxants: none warranted  Adjuvants: gabapentin now at 300 mg q hs mg  - caution can worsen CHF/peripheral edema - required bumex during admission and right foot swelling present during admission - decrease to 300 mg - dose dependent taper to lowest most effective dose, APAP prn - schedule 975 mg po TID, hydroxyzine  po TID prn,   Antidepressants/anxiolytics: home lexapro 10 mg po daily, zyprexa 5 mg po at bedtime,   Opioids: Change Dilaudid to tramadol 25-50 mg q 8 h prn, home medication. Watching renal fx.  IV Pain medication: DC  3)Non-medication interventions: per nursing  Acupuncture consult - if agreeable  Integrative consult - if agreeable  4)Constipation Prophylaxis:  senna/doc x 1 po BID, miralax prn, bisacodyl prn  5) Care Teams: HMS, cardiology, pain     -MN  pulled from system on 9/29/23. This indicates last filled tramadol 50 mg #60 (30 day supply) on 8/25 from Tobi Miguel, and gabapentin 600 mg on 6/25 #30 (30 day supply) from Jacob Campa MD.   Discharge Recommendations - tramadol 25-50 mg q 8 h prn, Rx 8/25 for tramadol, would not need Rx if going home.                Joyce Sandoval [facility]  Acute Care Pain Management Program   Hours of pain coverage 7a-1700- after 1700 please call the house officer   [facility] (Fredy CESAR, [place], [place], RH)

## 2023-10-03 NOTE — PROGRESS NOTES
Patient received neb treatment per MD order. Pt educated with the explanation of the medication they received. Patient showed understanding with verbalization. PRN mucomyst given to aide in coughing up secretions.  RN gave PRN albuterol prior to mucomyst.  Mucomyst given inline with aerobika.  BS: diminished.  Pt on 5L.     Peri Garcia, RT on 10/2/2023 at 9:40 PM

## 2023-10-03 NOTE — PROGRESS NOTES
CLINICAL NUTRITION SERVICES - REASSESSMENT NOTE     Nutrition Prescription    RECOMMENDATIONS FOR MDs/PROVIDERS TO ORDER:  Recommend replace potassium     Malnutrition Status:    Not Met     Recommendations already ordered by Registered Dietitian (RD):  Continue Ensure enlive BID     Future/Additional Recommendations:  Monitor po, wt, labs.      EVALUATION OF THE PROGRESS TOWARD GOALS   Diet: NPO    Nutrition Supplement: Ensure enlive strawberry w/ ice- Held with NPO    Intake: Okay     10/2 % 1118 kcals, 44 gm protein   10/1 50% 1442 kcals, 73 gm protein   9/30 100% x3 meals, 1176 kcals, 52 gm protein   Pt meeting >90% estimated calorie needs and % estimated protein needs      NEW FINDINGS   Met with pt at bedside this afternoon. Pt reports okay oral intakes and appetite. Pt tolerating ensure enlive BID, okay with continuing.   Pt continues to decline meals occasionally.   Pt reporting feeling at baseline per chart review.    Per provider, new adrenal nodules concerning for metastasis, right adrenal hemorrhage, oncology following.     ANTHROPOMETRICS  Height: 0 cm (Data Unavailable)  Most Recent Weight: 51 kg (112 lb 8 oz)    IBW: 45 kg  BMI: Normal BMI  Weight History: Pt w/ 2-3 kg wt fluctuations, no clinically significant wt loss  Date/Time Weight Weight Method   10/02/23 0419 51 kg (112 lb 8 oz) Standing scale   10/01/23 0526 53.2 kg (117 lb 3.2 oz) Standing scale   09/25/23 0421 48.6 kg (107 lb 1.6 oz) Standing scale     Wt Readings from Last 10 Encounters:   10/02/23 51 kg (112 lb 8 oz)   09/19/23 51.3 kg (113 lb 1.5 oz)   09/11/23 47.2 kg (104 lb)   08/29/23 48.5 kg (107 lb)   08/27/23 50 kg (110 lb 3.7 oz)   08/15/23 50.8 kg (112 lb)   08/08/23 51.7 kg (114 lb)   08/02/23 51 kg (112 lb 8 oz)   07/12/23 49.9 kg (110 lb)   07/04/23 50.5 kg (111 lb 5.3 oz)        Dosing Weight: 48.6 kg actual body wt based on driest admit wt     ASSESSED NUTRITION NEEDS  Estimated Energy Needs: 5662-1528 kcals/day  (25 - 30 kcals/kg)  Justification: Maintenance  Estimated Protein Needs: 58-73 grams protein/day (1.2 - 1.5 grams of pro/kg)  Justification: Repletion, resp failure  Estimated Fluid Needs: 3725-4761 mL/day (25 - 30 mL/kg)   Justification: Maintenance    PHYSICAL FINDINGS  See malnutrition section below.  Per [employer]:  X2 BM 10/2   100 ml + x2 UOP 10/2   Trace edema   Wounds- leg skin tear, buttocks abrasions- stable per WOC    LABS  Reviewed  K 3.3(L), BUN 30.3(H), Creat 1.43(H)    MEDICATIONS  Reviewed  Bumex, lexapro, pepcid, toprol xl, deltasone, senokot, aldactone    MALNUTRITION: reassessed 10/3  % Weight Loss:  None noted  % Intake:  No decreased intake noted  Subcutaneous Fat Loss:  None observed  Muscle Loss:  Clavicle bone region mild depletion, Acromion bone region mild depletion, and Dorsal hand region moderate depletion  Fluid Retention:  Trace     Malnutrition Diagnosis: Patient does not meet two of the above criteria necessary for diagnosing malnutrition    NUTRITION DIAGNOSIS  Inadequate oral intake related to poor appetite, forgetting meals as evidenced by moderate muscle loss, report of frequently missing meals     Evaluation: Progressing     INTERVENTIONS  Implementation  Recommend replace potassium   Continue Ensure enlive BID     Goals  Patient to consume % of nutritionally adequate meal trays TID, or the equivalent with supplements/snacks - progressing   Electrolytes WNL - NEW    Monitoring/Evaluation  Progress toward goals will be monitored and evaluated per protocol.

## 2023-10-03 NOTE — PROGRESS NOTES
[facility]    Progress Note - Hospitalist Service       Date of Admission:  9/23/2023    Assessment & Plan   Irene León is a 78 year old female admitted on 9/23/2023. She has a history of CHF with reduced ejection fraction (most recently EF 20-25% on 6/22), cardiomyopathy, severe COPD with chronic home oxygen 3 L at rest and 5-6 L with exertion, CAD, paroxysmal A-fib on Xarelto, CKD stage IV, lung cancer on oral chemo and breast cancer on oral chemo who is admitted for acute hypoxic respiratory failure likely multifactorial related to COPD and CHF. 9/28/23 showing new onset aspiration pneumonitis/pneumonia in the right lower lobe with moderate to large volume airway secretions; discharge was delayed, Zosyn was started. 9/29/23 CT showing new finding of bilateral adrenal nodules concerning for metastases, with likely hemorrhage.      R sided chest pain   New R. Adrenal Mass, hemorrhage  Invasive ductal carcinoma of the breast  right upper lobe pulmonary lung cancer  Acute onset R chest pain 9/29/23. EKG, CXR, RUQ were obtained and unremarkable. CTPE did show new aspiration pneumonia. CT abdomen/pelvis which showed right adrenal nodule hemorrhage, concern for metastasis in the setting of known invasive ductal carcinoma of the breast and right upper lobe pulmonary lung cancer.  - Pain team consulted, appreciate recs              -lidocaine patch x 2 daily              -discontinue Toradol               -decrease gabapentin to 300 mg at bedtime given risk of worsening peripheral edema              -scheduled APAP 975 mg TID              -hydroxyzine 50 mg TID PRN              -Dilaudid 1-2 mg PO q4hr PRN first line, Dilaudid 0.2 mg IV q2hr PRN second line              -add senna/doc x 1 PO BID, MiraLAX PRN, bisacodyl PRN  -Hemonc consulted; appreciate recommendations  -Pain control  -Avoid NSAIDs  -Continue Xarelto  -Resuming sotorasib 720 mg daily  - General Surgery consult  recommendations:   -Recommend conservative management, serial CBC, consider discontinuing xarelto, holding toradol if concern for continued bleeding  -If evidence of continued bleeding despite conservative measures, could consider IR intervention, however thorough discussion of GOC would be encouraged  -Ms. León is not a surgical candidate, and would not tolerate a general anesthetic should she need a surgical intervention.  -signed off   - Palliative team following  - Planning for adrenal node biopsy on 10/5/23   -Xarelto has been held since 10/2/23 PM     Aspiration pneumonia  Leukocytosis, improving   Possible COPD exacerbation  history of severe COPD on home oxygen at 3 LPM. 9/28/23 CT new manifestations of aspiration pneumonitis/pneumonia in the right lower lobe with moderate to large volume airway secretions. Started Zosyn on 9/28/23 PM. Patient reports improvement in cough.  - Discontinue IV Zosyn (9/28-10/2) and switch to Augmentin; likely 7-day course  - Continue robitussin PRN TID for cough  - Continue PTA inhalers, mucomyst, tessalon perles  - Duonebs and Mucomyst PRN   - S/p solumedrol in ER  -Prednisone taper as follows:   -  5-day course of 20 mg daily (started 9/30/23) followed by 5-day course of 10 mg daily (will taper down to 10 mg daily on 10/4/23)  - Antibiotics to date:               -doxycycline (9/23-9/28)              -ceftriaxone (9/23-9/24)   - Respiratory panel and sputum cultures pending collection as able  - Strict I&O's and daily weights  - Continuous pulse ox   - Supplemental O2 as needed to maintain oxygen saturations between 88%-92%, RRT is on board to manage further  - Hospice/palliative consulted and following, appreciate recommendations     CHF with reduced EF 20%-25%  Cardiomyopathy  CHF exacerbation   Recent hospitalization for respiratory failure 2/2 CHF. BNP on admission ~6,000 (noted to be 9,600 on recent admission 9/15/23). Troponin elevated on admission to 43 but was 44 two  days prior so no significant change. CHF likely contributing to respiratory failure. Switched from Lasix to Bumex on 9/24/23 per Cardiology.  - Cardiology consulted, appreciate recommendations              -Continue bumetanide 1 mg twice daily  -Continue on rivaroxaban for anticoagulation and statin therapy  -Consider outpatient appointment with EP to discuss CRT device.    - Strict I&O's and daily weights   - Continuous pulse ox   - Supplemental O2 as needed to maintain oxygen saturations 88-92% though as above, RRT is on board for further management  - Cardiac tele resumed 9/25/23 in light of possible CHF exacerbation and addition of possible QTC prolonging medication.     CKD stage 4  Cr 1.73 on admission, GFR 30. Baseline Cr approx 1.4-1.6. 1.7 this AM.   -encourage PO intake   - Trend BMP      Lower extremity wounds  - WOC consult placed 9/23/23, appreciate recommendations      Right foot swelling  9/23/23 right foot XR showing that the right foot is negative for fracture, there is significant soft tissue swelling along the dorsal aspect of the foot, cellulitis could also have this appearance. Area of swelling is bruised and raised, appears to be hematoma but will continue to monitor daily for any changes. Tenderness to palpation but no surrounding erythema or warmth, and no fevers.   - Continues to improve on exam and per patient     Insomnia  - PRN melatonin 10 mg at bedtime  - PRN hydroxyzine 25-50 mg daily              -Patient has history of QTC prolongation so will monitor closely with cardiac tele as above which was also started due to possible CHF exacerbation       Loose stools   Per RN, patient reported loose stools and requests imodium. Medication has increased risk of PT prolongation  -loperamide ordered   -cardiac tele ordered given significant cardiac risk factors as mentioned above     Chronic conditions  Paroxysmal A. Fib: continue PTA DOAC (Xarelto) and PTA metoprolol   Lung cancer: follows  with Dr. Suarez at [facility], currently oral chemo medication is on hold due to side effects   Breast cancer: follows with Dr. Suarez at [facility], continue daily PTA tamoxifen        Diet: Snacks/Supplements Adult: Ensure Enlive; Between Meals  Diet  Regular Diet Adult    DVT Prophylaxis: DOAC  Hayes Catheter: Not present  Fluids: PO  Lines: None     Cardiac Monitoring: None  Code Status: No CPR- Do NOT Intubate      Clinically Significant Risk Factors        # Hypokalemia: Lowest K = 3.3 mmol/L in last 2 days, will replace as needed  # Hypernatremia: Highest Na = 147 mmol/L in last 2 days, will monitor as appropriate      # Hypoalbuminemia: Lowest albumin = 2.9 g/dL at 9/29/2023  6:08 AM, will monitor as appropriate       # Hypertension: Noted on problem list    # Chronic heart failure with reduced ejection fraction: last echo with EF <40%           # COPD: noted on problem list          Disposition Plan      Expected Discharge Date: 10/04/2023, 12:00 PM  Discharge Delays: *Medically Ready for Discharge    Discharge Comments: [facility] - Time?  Abdominal CT 9/29  NPO for biopsy        The patient's care was discussed with the Attending Physician, Dr. Franklin .    JOSE MIGUEL LAMAS MD   Hospitalist Service  [facility]  Securely message with [employer] (more info)  Text page via [email or web address] Paging/Directory   ______________________________________________________________________    Interval History   No acute events overnight. Patient reports feeling well today. Pain is well-controlled. Breathing has returned to baseline. Has had lingering cough but is improving. No further concerns. Planning for biopsy tomorrow and still hoping to ultimately discharge to [facility].    Physical Exam   Vital Signs: Temp: 97.9  F (36.6  C) Temp src: Oral BP: 126/67 Pulse: 71   Resp: 18 SpO2: 95 % O2 Device: Nasal cannula Oxygen Delivery: 3 LPM  Weight: 112 lbs 8 oz    Constitutional: awake, alert, comfortable-appearing, sitting  up in chair, no acute distress  HEENT: Lids and lashes normal, sclera clear, conjunctiva normal. Normocephalic and atraumatic. No rhinorrhea. Moist mucus membranes.   Respiratory: Patient breathing comfortably on 4L O2 via oxymask, no increased work of breathing or respiratory distress.  Cardiovascular: Skin is warm and well-perfused.  Skin: Bruising throughout extremities. Several chronic-appearing scabs/lesions and dry skin throughout bilateral lower extremities that appear to be well-healing. Raised, bruised area on dorsal aspect of patient's right foot that is tender to palpation but has no surrounding erythema or warmth with significantly improved swelling.  Musculoskeletal: Trace pitting edema of bilateral lower extremities.  Neurologic: Awake, alert, oriented to name, place and time. No apparent focal deficits.    Data     I have personally reviewed the following data over the past 24 hrs:    13.0 (H)  \   9.2 (L)   / 185     144 110 (H) 30.3 (H) /  75   3.3 (L) 27 1.43 (H) \       Imaging results reviewed over the past 24 hrs:   No results found for this or any previous visit (from the past 24 hour(s)).

## 2023-10-03 NOTE — PROGRESS NOTES
Pt and family member were busy on a phone call. Pt expressed appreciation for visit and asked that a  return tomorrow.    VERONICA Salazar.  Palliative Care Team

## 2023-10-03 NOTE — TELEPHONE ENCOUNTER
Stephanie called and reports that she is currently in a transitional care unit. She has restarted her cancer pill at 6/day instead of 8. She is requesting a refill. I told her I would let Dr. Suarez know to send out the order for the refill and I will also let the oral chemo ppl know that the shipping address has changed. It should be mailed to daughter Leelee's house, [address] [place], [place], [address]. She thanked me for my time. BAILEY Jackson, RN, OCN, CBCN

## 2023-10-03 NOTE — PROGRESS NOTES
Care Management Follow Up    Length of Stay (days): 10    Expected Discharge Date: 10/04/2023     Concerns to be Addressed:    discharge planning  Patient plan of care discussed at interdisciplinary rounds: Yes    Anticipated Discharge Disposition:  (TBD)  Disposition Comments: CM continue to follow and assist with D/C planning. May want to return home with added hospice services. Daughter will transport. Daughter is primary family contact.  Anticipated Discharge Services:  (TBD)  Anticipated Discharge DME:  (TBD- currently on home 02 supplied by [employer] and has portable)    Patient/family educated on Medicare website which has current facility and service quality ratings:  (None at this time)  Education Provided on the Discharge Plan:  (AVS per bedside RN)  Patient/Family in Agreement with the Plan:      Referrals Placed by CM/SW:  (None at this time)  Private pay costs discussed:  TBD    Additional Information:  Writer spoke briefly with daughterLeelee, who requested update re: patient care.  Writer discussed appears plans to move ahead with biopsies next likely 10/5.  [facility].    CM following along.    UPDATE: Writer had long conversation with daughter Leelee-support provided.  Leelee would really like [facility] if at all possible.  Writer spoke briefly to Shanda  [facility] admissions who asked referral be sent again-done.  Shanda will re-review but states beds are tight.    Renata Mane, CM

## 2023-10-04 NOTE — PLAN OF CARE
Goal Outcome Evaluation:      Patient alert and oriented, VSS, remains on 3-5L oxygen.  K+ 3.1 this AM, replaced, rechecked ordered for this afternoon.  Patient reported moderate pain in buttocks and bilateral legs, given prn Tramadol and prn cream with relief, now pain 0-2/10.  Patient declined getting back to bed.  Patient up in chair, encouraged frequent weight shifting and rotating pillows.  Patient up with 1 assist, gait belt, walker to ambulate to bathroom with oxygen.  With activity patient requiring 5L per nasal cannula, weaned back to 3L per nasal cannula at rest.  Continuous oximetry in use.  Lung sounds diminished, patient has congested frequent cough, encouraged to turn, cough, deep breath on rounds.  Spoke with IR today and IR called [facility] and oncology.  Per oncology NP plan biopsy cancelled, to restart xarelto.  Per patient's daughter Leelee to discuss plan of care with patient this afternoon after 1700 with hematology/oncology.  Patient planning on TCU at discharge, CM following.

## 2023-10-04 NOTE — PROGRESS NOTES
[facility] ACUTE PAIN SERVICE    ([facility], [facility], [facility])  Daily PAIN Progress Note     Acute Pain Management Team has been following Stephanie León for pain, not needing pain medications at this time, have changed pain med back to lower dose tramadol which she had been taking at home.   Discussed with Octavia Maciel, CNS, CNP, Acute Pain Management Team, and Palliative Care Provider.    Joyce Sandoval, [facility]  Acute Care Pain Management Program   Hours of pain coverage 7a-1700- after 1700    [facility]

## 2023-10-04 NOTE — CONSULTS
Chart and imaging reviewed. After a phone conversation with Dr. Suarez from Oncology, we will hold off on biopsy for now. The right adrenal mass has recently hemorrhaged and it is difficult to assess what might be hematoma versus right adrenal mass. The new left adrenal nodule is not accessible to biopsy secondary to positioning. The patient is on 3-4L of oxygen and biopsy of the lung nodule would also be high risk.

## 2023-10-04 NOTE — PLAN OF CARE
Problem: Plan of Care - These are the overarching goals to be used throughout the patient stay.    Goal: Optimal Comfort and Wellbeing  Outcome: Progressing   Goal Outcome Evaluation:  Patient is alert and oriented X 4. Denied pain. VSS on 3-4L via NC. IV saline locked. Pt. NPO at midnight for a possible procedure today but she reported eating a small cookie at 1:30am. Call light within reach.

## 2023-10-04 NOTE — PROGRESS NOTES
[facility]    Progress Note - Hospitalist Service       Date of Admission:  9/23/2023    Assessment & Plan   Irene León is a 78 year old female admitted on 9/23/2023. She has a history of CHF with reduced ejection fraction (most recently EF 20-25% on 6/22), cardiomyopathy, severe COPD with chronic home oxygen 3 L at rest and 5-6 L with exertion, CAD, paroxysmal A-fib on Xarelto, CKD stage IV, lung cancer on oral chemo and breast cancer on oral chemo who is admitted for acute hypoxic respiratory failure likely multifactorial related to COPD and CHF. 9/28/23 showing new onset aspiration pneumonitis/pneumonia in the right lower lobe with moderate to large volume airway secretions; discharge was delayed, Zosyn was started. 9/29/23 CT showing new finding of bilateral adrenal nodules concerning for metastases, with likely hemorrhage.      R sided chest pain   New R. Adrenal Mass, hemorrhage  Invasive ductal carcinoma of the breast  right upper lobe pulmonary lung cancer  Acute onset R chest pain 9/29/23. EKG, CXR, RUQ were obtained and unremarkable. CTPE did show new aspiration pneumonia. CT abdomen/pelvis which showed right adrenal nodule hemorrhage, concern for metastasis in the setting of known invasive ductal carcinoma of the breast and right upper lobe pulmonary lung cancer.  - Pain team consulted, appreciate recs (signing off 10/4/23 as pain has been well-controlled with minimal need for intervention)               -lidocaine patch x 2 daily              -discontinue Toradol               -decrease gabapentin to 300 mg at bedtime given risk of worsening peripheral edema              -scheduled APAP 975 mg TID              -hydroxyzine 50 mg TID PRN              -Dilaudid 1-2 mg PO q4hr PRN first line, Dilaudid 0.2 mg IV q2hr PRN second line              -add senna/doc x 1 PO BID, MiraLAX PRN, bisacodyl PRN  -Hemonc consulted; appreciate recommendations  -Pain control  -Avoid  NSAIDs  -Continue Xarelto  -Resuming sotorasib 720 mg daily  - General Surgery consult recommendations:   -Recommend conservative management, serial CBC, consider discontinuing xarelto, holding toradol if concern for continued bleeding  -If evidence of continued bleeding despite conservative measures, could consider IR intervention, however thorough discussion of GOC would be encouraged  -Ms. León is not a surgical candidate, and would not tolerate a general anesthetic should she need a surgical intervention.  -signed off   - Palliative team following  - Holding off on biopsy for now per IR and Oncology; Dr. Suarez of Oncology planning to see patient after clinic to further discuss. Will inform patient's daughter per patient request.   -Resume Xarelto this evening in light of decision to defer biopsy     Aspiration pneumonia  Leukocytosis, improving   Possible COPD exacerbation  history of severe COPD on home oxygen at 3 LPM. 9/28/23 CT new manifestations of aspiration pneumonitis/pneumonia in the right lower lobe with moderate to large volume airway secretions. Started Zosyn on 9/28/23 PM. Patient reports improvement in cough.  - Discontinue IV Zosyn (9/28-10/2) and switch to Augmentin; likely 7-day course  - Continue robitussin PRN TID for cough  - Continue PTA inhalers, mucomyst, tessalon perles  - Duonebs and Mucomyst PRN   - S/p solumedrol in ER  -Prednisone taper as follows:   -  5-day course of 20 mg daily (started 9/30/23) followed by 5-day course of 10 mg daily (to be started tomorrow 10/5/23)  - Antibiotics to date:               -doxycycline (9/23-9/28)              -ceftriaxone (9/23-9/24)   - Respiratory panel and sputum cultures pending collection as able  - Strict I&O's and daily weights  - Continuous pulse ox   - Supplemental O2 as needed to maintain oxygen saturations between 88%-92%, RRT is on board to manage further  - Hospice/palliative consulted and following, appreciate recommendations     CHF  with reduced EF 20%-25%  Cardiomyopathy  CHF exacerbation   Recent hospitalization for respiratory failure 2/2 CHF. BNP on admission ~6,000 (noted to be 9,600 on recent admission 9/15/23). Troponin elevated on admission to 43 but was 44 two days prior so no significant change. CHF likely contributing to respiratory failure. Switched from Lasix to Bumex on 9/24/23 per Cardiology.  - Cardiology consulted, appreciate recommendations              -Continue bumetanide 1 mg twice daily  -Continue on rivaroxaban for anticoagulation and statin therapy  -Consider outpatient appointment with EP to discuss CRT device.    - Strict I&O's and daily weights   - Continuous pulse ox   - Supplemental O2 as needed to maintain oxygen saturations 88-92% though as above, RRT is on board for further management  - Cardiac tele resumed 9/25/23 in light of possible CHF exacerbation and addition of possible QTC prolonging medication.     CKD stage 4  Cr 1.73 on admission, GFR 30. Baseline Cr approx 1.4-1.6. 1.7 this AM.   -encourage PO intake   - Trend BMP      Lower extremity wounds  - WOC consult placed 9/23/23, appreciate recommendations      Right foot swelling  9/23/23 right foot XR showing that the right foot is negative for fracture, there is significant soft tissue swelling along the dorsal aspect of the foot, cellulitis could also have this appearance. Area of swelling is bruised and raised, appears to be hematoma but will continue to monitor daily for any changes. Tenderness to palpation but no surrounding erythema or warmth, and no fevers.   - Continues to improve on exam and per patient     Insomnia  - PRN melatonin 10 mg at bedtime  - PRN hydroxyzine 25-50 mg daily              -Patient has history of QTC prolongation so will monitor closely with cardiac tele as above which was also started due to possible CHF exacerbation     Loose stools   Per RN, patient reported loose stools and requests imodium. Medication has increased risk  of PT prolongation  -loperamide ordered   -cardiac tele ordered given significant cardiac risk factors as mentioned above     Chronic conditions  Paroxysmal A. Fib: continue PTA DOAC (Xarelto) and PTA metoprolol   Lung cancer: follows with Dr. Suarez at [facility], currently oral chemo medication is on hold due to side effects   Breast cancer: follows with Dr. Suarez at [facility], continue daily PTA tamoxifen        Diet: Snacks/Supplements Adult: Ensure Enlive; Between Meals  Diet  Regular Diet Adult    DVT Prophylaxis: DOAC  Hayes Catheter: Not present  Fluids: PO  Lines: None     Cardiac Monitoring: None  Code Status: No CPR- Do NOT Intubate      Clinically Significant Risk Factors        # Hypokalemia: Lowest K = 3.1 mmol/L in last 2 days, will replace as needed  # Hypernatremia: Highest Na = 147 mmol/L in last 2 days, will monitor as appropriate      # Hypoalbuminemia: Lowest albumin = 2.9 g/dL at 9/29/2023  6:08 AM, will monitor as appropriate       # Hypertension: Noted on problem list    # Chronic heart failure with reduced ejection fraction: last echo with EF <40%           # COPD: noted on problem list          Disposition Plan      Expected Discharge Date: 10/06/2023    Discharge Delays: *Medically Ready for Discharge  Placement - TCU    Discharge Comments: [facility]CU - [facility] - Time?  Abdominal CT 9/29  NPO for biopsy  [facility] accepted, date pending based on when biopsy taking place (adrenal) holding xarelto for biopsy        The patient's care was discussed with the Attending Physician, Dr. Mukherjee .    JOSE MIGUEL LAMAS MD   Hospitalist Service  [facility]  Securely message with [employer] (more info)  Text page via [email or web address] Paging/Directory   ______________________________________________________________________    Interval History   No acute events overnight. Patient reports feeling well today. Pain is well-controlled with minimal need for intervention. No worsening SOB. Cough is improving. Swelling  of bilateral lower extremities continues to be minimal/well-controlled. No current concerns. Still hoping for discharge to TCU.    Physical Exam   Vital Signs: Temp: 97.8  F (36.6  C) Temp src: Oral BP: 121/65 Pulse: 74   Resp: 20 SpO2: 95 % O2 Device: Nasal cannula Oxygen Delivery: 5 LPM  Weight: 114 lbs 3.2 oz    Constitutional: awake, alert, comfortable-appearing, sitting up in chair, no acute distress  HEENT: Lids and lashes normal, sclera clear, conjunctiva normal. Normocephalic and atraumatic. No rhinorrhea. Moist mucus membranes.   Respiratory: Patient breathing comfortably on 4L O2 via oxymask, no increased work of breathing or respiratory distress.  Cardiovascular: Skin is warm and well-perfused.  Skin: Bruising throughout extremities. Several chronic-appearing scabs/lesions and dry skin throughout bilateral lower extremities that appear to be well-healing. Raised, bruised area on dorsal aspect of patient's right foot that is tender to palpation but has no surrounding erythema or warmth with significantly improved swelling.  Musculoskeletal: Trace pitting edema of bilateral lower extremities.  Neurologic: Awake, alert, oriented to name, place and time. No apparent focal deficits.    Data     I have personally reviewed the following data over the past 24 hrs:    N/A  \   N/A   / N/A     147 (H) 109 (H) 25.5 (H) /  88   3.1 (L) 28 1.31 (H) \       Imaging results reviewed over the past 24 hrs:   No results found for this or any previous visit (from the past 24 hour(s)).

## 2023-10-04 NOTE — PLAN OF CARE
Goal Outcome Evaluation:    VSS on 3L NC, when walking 3-6L NC. Pt is alert and oriented but has some forgetfulness that gets worse at night. Calls appropriately for needs. Assist of 1 w/ gb and walker. Pt tolerating regular diet, will be NPO at midnight for biopsy tomorrow. IV saline locked. Has a purewick in for the night because she was a little unsteady when we got up to go to the bathroom and was given a diuretic this evening. Gave prn imodium because of diarrhea. Denies pain. Alarms on.

## 2023-10-04 NOTE — PROGRESS NOTES
MD NOTIFICATION NOTE  Maria C Starr paged regarding: Potassium low 3.1.  [facility] putting in potassium replacement protocol.    Oncology NP called and plan to restart xarelto, no biopsy, oncology will page [facility] resident.

## 2023-10-05 PROBLEM — R10.11 RIGHT UPPER QUADRANT PAIN: Status: ACTIVE | Noted: 2023-01-01

## 2023-10-05 NOTE — PROGRESS NOTES
SPIRITUAL HEALTH SERVICES Progress Note      Saw pt Irene León to follow up after last visit to assess and support    Patient/Family Understanding of Illness and Goals of Care - Stephanie is hoping to discharge later today.  She shared her pain is being managed well.  She shared she has lump on her right side which she is worried could be cancer    Distress and Loss - Stephanie continues to process the grief following her husbands death, grappling with her declining health and seeking to cope while supporting her family.    Strengths, Coping, and Resources - She is future oriented and named the hope of going home is the animating force that keeps her going     Meaning, Beliefs, and Spirituality - She affirmed it was meaningful to receive holy communion and warmly welcomed prayer    Plan of Care - Due to pending discharge no plans to follow but [facility]  available per request       Emir Guerrero M.Div., [facility]  Staff    [phone]

## 2023-10-05 NOTE — PROGRESS NOTES
Care Management Discharge Note    Discharge Date: 10/05/2023     Discharge Disposition: [facility]    Discharge Services: Transportation Services    Discharge DME: Oxygen    Discharge Transportation: family or friend will provide    Additional Information:  RODOLFO had a call from [facility] stating that the orders for 02 didn't appear in the chart. RODOLFO called resident team and requested new orders. Orders updated by the resident team and sent to [facility] as needed. DICK called and spoke with [facility] who is updating the orders as needed.

## 2023-10-05 NOTE — PLAN OF CARE
Problem: Plan of Care - These are the overarching goals to be used throughout the patient stay.    Goal: Absence of Hospital-Acquired Illness or Injury  Intervention: Prevent Skin Injury  Recent Flowsheet Documentation  Taken 10/5/2023 0100 by Tg Catherine RN  Body Position: position changed independently   Goal Outcome Evaluation:    A&OX4 and VSS on 3 liter O2 via NC and 5 liters during ambulation. Purewick in place draining well. IncontinentX2 but no BM over night. PT 0945. Potassium recheck in am. Discharge to [facility] pending. Daughter very involved.

## 2023-10-05 NOTE — DISCHARGE SUMMARY
[facility]  Discharge Summary - Medicine & Pediatrics       Date of Admission:  9/23/2023  Date of Discharge:  10/5/23  Discharging Provider: Dr. Isma Franklin  Discharge Service: Hospitalist Service    Discharge Diagnoses   Patient Active Problem List   Diagnosis    Left hip pain    Nonischemic cardiomyopathy (H)    COPD (chronic obstructive pulmonary disease) (H)    Benign essential hypertension    Chronic kidney disease, stage IV (severe) (H)    Factor 5 Leiden mutation, heterozygous (H24)    Senile osteoporosis    COPD exacerbation (H)    Community acquired pneumonia of left upper lobe of lung    Acute on chronic diastolic congestive heart failure (H)    Atrial fibrillation with rapid ventricular response (H)    Weight loss    Acute on chronic respiratory failure with hypoxia (H)    Prolonged Q-T interval on ECG    Neutrophilia    Abscess of upper lobe of left lung with pneumonia (H)    Invasive ductal carcinoma of breast, female, left (H)    ACP (advance care planning)    Anisocoria    At high risk for impaired skin integrity    Trochanteric bursitis    Chronic anticoagulation    Chronic systolic congestive heart failure (H)    Closed fracture of hip with delayed healing    Compression fracture of L1 vertebra, sequela    Depression with anxiety    Dyslipidemia    Dyspnea    Exacerbation of intermittent asthma, unspecified asthma severity    Family history of blood disease    Fibrocystic breast    Fissure in skin of foot    Fracture of femur (H)    Gait disorder    GERD (gastroesophageal reflux disease)    Herniated intervertebral disc    Groin pain    Herpes zoster without complication    High frequency sensorineural hearing loss of left ear    Patient's other noncompliance with medication regimen    Hot flashes due to tamoxifen    Hot flashes, menopausal    Hyperlipidemia    Hypoxia    Idiopathic edema    Insomnia    Malignant neoplasm of central portion of left female breast  (H)    Metabolic encephalopathy    Unspecified asthma, uncomplicated    Muscle weakness (generalized)    OAB (overactive bladder)    Osteoarthritis of hip    Osteoporosis    Other abnormalities of gait and mobility    Other chronic sinusitis    Pain due to fracture    Personal history of malignant neoplasm of breast    Personal history of transient ischemic attack (TIA), and cerebral infarction without residual deficits    Physical deconditioning    Piriformis syndrome    Post menopausal syndrome    Pulsatile tinnitus of both ears    S/P hip replacement, left    Sacro-iliac pain    Sacral insufficiency fracture    T12 compression fracture (H)    Tendonitis    Urge incontinence    Urinary incontinence    Wedge compression fracture of t11-T12 vertebra, subsequent encounter for fracture with routine healing    Heart failure with reduced ejection fraction (H)    Leukocytosis, unspecified type    Acute pneumonia    Pedal edema    Chronic combined systolic and diastolic congestive heart failure (H)    Pneumonia of left upper lobe due to infectious organism    Malignant neoplasm of upper lobe of right lung (H)    Heart failure, systolic, acute on chronic (H)    Right upper quadrant pain     Patient's daughter Leelee to supply and manage sotorasib at TCU.    Follow-ups Needed After Discharge      Follow up with jail physician.  The following labs/tests are   recommended: BMP.    Follow up with primary oncologist as planned.      Follow up with primary outpatient cardiologist.         Discharge Disposition   Discharged to home  Condition at discharge: Stable    Hospital Course   Irene León is a 78 year old female admitted on 9/23/2023. She has a history of CHF with reduced ejection fraction (most recently EF 20-25% on 6/22), cardiomyopathy, severe COPD with chronic home oxygen 3 LPM at rest and 5-6 LPM with exertion, CAD, paroxysmal A-fib on Xarelto, CKD stage IV, lung cancer on oral chemo and breast cancer on  oral chemo who is admitted for acute hypoxic respiratory failure likely multifactorial related to COPD and CHF. 9/28/23 showing new onset aspiration pneumonitis/pneumonia in the right lower lobe with moderate to large volume airway secretions; discharge was delayed, Zosyn was started. 9/29/23 CT showing new finding of bilateral adrenal nodules concerning for metastases, with likely hemorrhage.      Initial Hospital Course (9/23/23-9/28/23):  Patient with history of severe COPD on home oxygen at 3 LPM. Acutely SOB at home on day of admission requiring BiPAP by EMS. VBG on arrival with pH 7.42 with CO2 of 56. Recevied duonebs x2 and solumedrol in ED, transitioned to 5L O2 via oxymask where she stabilized. Early 9/26/23 AM, her oxygen requirement decreased back down to 3 LPM. It has since fluctuated between 3-5 LPM. In addition, increased LE edema was noted on admission. Cardiology was consulted for transition from Lasix to Bumex. There was good resolution of LE edema by day of discharge, and patient reported good tolerance of the Bumex. Throughout hospitalization, patient remained afebrile with no new fevers, chills, worsening cough, or worsening SOB. She did receive short course of IV ceftriaxone (9/23-9/24) and a full course of doxycycline (9/23-9/28). She also received 5-day course of prednisone while hospitalized, will continue with taper. Of note, patient's  and primary caregiver passed away in the ICU during this same hospitalization.     Subsequent Hospital Course (9/28/23-10/5/23):  Approximately 5 minutes before discharge to TCU on 9/28/23, patient developed acute onset of severe right-sided chest pain near the right breast, under the rib cage. EKG, CXR were obtained and unremarkable. Pain was not improved by nitroglycerin or aspirin administration. Patient reported diaphoresis and nausea as well with 2x episodes of bilious vomit. CTPE showed aspiration pneumonia but no PE. CT A/P performed 9/29/23  showed right adrenal nodule hemorrhage as likely cause of her pain. In addition, it showed bilateral adrenal nodules suspected to be new metastasis. Pain improved significantly in the following days and essentially resolved. Hem/Onc and IR saw/discussed patient case regarding likely new metastasis to adrenal glands. Biopsy was deferred but patient did elect to resume sotorasib for cancer treatment. Patient's condition has remained stable with no new or worsening pain, SOB, cough, or LE edema.    The following problems were addressed during her hospitalization:    R sided chest pain   New R. Adrenal Mass, hemorrhage  Invasive ductal carcinoma of the breast  right upper lobe pulmonary lung cancer  Acute onset R chest pain 9/29/23. EKG, CXR, RUQ were obtained and unremarkable. CTPE did show new aspiration pneumonia. CT abdomen/pelvis which showed right adrenal nodule hemorrhage, concern for metastasis in the setting of known invasive ductal carcinoma of the breast and right upper lobe pulmonary lung cancer.  - Pain team consulted, will continue with the following:              -lidocaine patch x 2 daily              -discontinue Toradol               -decrease gabapentin to 300 mg at bedtime given risk of worsening peripheral edema              -scheduled APAP 975 mg TID              -add senna/doc x 1 PO BID, MiraLAX PRN, bisacodyl PRN  -Hemonc consulted:  -Pain control  -Avoid NSAIDs  -Continue Xarelto  -Resuming sotorasib 720 mg daily; patient daughter Leelee to supply and manage this at TCU  - General Surgery consult:   -Recommend conservative management, serial CBC, consider discontinuing xarelto, holding toradol if concern for continued bleeding  -If evidence of continued bleeding despite conservative measures, could consider IR intervention, however thorough discussion of GOC would be encouraged  -Ms. León is not a surgical candidate, and would not tolerate a general anesthetic should she need a surgical  intervention.  -signed off   - Palliative team following  - Holding off on biopsy for now per IR and Oncology     Aspiration pneumonia  Leukocytosis, improving   Possible COPD exacerbation  History of severe COPD on home oxygen at 3 LPM. 9/28/23 CT showing new manifestations of aspiration pneumonitis/pneumonia in the right lower lobe with moderate to large volume airway secretions. Started Zosyn on 9/28/23 PM.   - Antibiotic regimen on admission:               -doxycycline (9/23-9/28)              -ceftriaxone (9/23-9/24)   - Antibiotic regimen following aspiration pneumonia on CT:   -IV Zosyn (9/28-10/2)  -Augmentin (10/2-10/5)  -Prednisone taper as follows:   - 5-day course of 20 mg daily (started 9/30/23) followed by 5-day course of 10 mg daily (started 10/5/23)  - Continue robitussin PRN TID for cough  - Continue PTA inhalers, mucomyst, tessalon perles  - Duonebs and Mucomyst PRN   - S/p solumedrol in ER     CHF with reduced EF 20%-25%  Cardiomyopathy  CHF exacerbation   Recent hospitalization for respiratory failure 2/2 CHF. BNP on admission ~6,000 (noted to be 9,600 on recent admission 9/15/23). Troponin elevated on admission to 43 but was 44 two days prior so no significant change. CHF likely contributing to respiratory failure. Switched from Lasix to Bumex on 9/24/23 per Cardiology.  - Cardiology consult:              -Continue bumetanide 1 mg twice daily  -Continue on rivaroxaban for anticoagulation and statin therapy  -Consider outpatient appointment with EP to discuss CRT device.       CKD stage 4  Cr 1.73 on admission, GFR 30. Baseline Cr approx 1.4-1.6. This has remained stable.     Lower extremity wounds  Right foot swelling  9/23/23 right foot XR showing that the right foot is negative for fracture, there is significant soft tissue swelling along the dorsal aspect of the foot, cellulitis could also have this appearance. Area of swelling is bruised and raised, appears to be hematoma but will continue  to monitor daily for any changes. Tenderness to palpation but no surrounding erythema or warmth, and no fevers.   - WOC consulted  - Continues to improve on exam and per patient     Insomnia  - PRN melatonin 10 mg at bedtime  - PRN hydroxyzine 25-50 mg daily              -Patient has history of QTC prolongation so will monitor closely with cardiac tele as above which was also started due to possible CHF exacerbation     Loose stools   Per RN, patient reported loose stools and requests imodium. Medication has increased risk of PT prolongation  - PRN Imodium      Chronic conditions  Paroxysmal A. Fib: continue PTA DOAC (Xarelto) and PTA metoprolol   Lung cancer: follows with Dr. Suarez at [facility]  Breast cancer: follows with Dr. Suarez at [facility],      Consultations This Hospital Stay   CARE MANAGEMENT / SOCIAL WORK IP CONSULT  RESPIRATORY CARE IP CONSULT  WOUND OSTOMY CONTINENCE NURSE  IP CONSULT  CARE MANAGEMENT / SOCIAL WORK IP CONSULT  CARDIOLOGY IP CONSULT  PALLIATIVE CARE ADULT IP CONSULT  SPIRITUAL HEALTH SERVICES IP CONSULT  SPIRITUAL HEALTH SERVICES IP CONSULT  PHYSICAL THERAPY ADULT IP CONSULT  OCCUPATIONAL THERAPY ADULT IP CONSULT  PAIN MANAGEMENT ADULT IP CONSULT  SURGERY GENERAL IP CONSULT  HEMATOLOGY & ONCOLOGY IP CONSULT  INTERVENTIONAL RADIOLOGY ADULT/PEDS IP CONSULT  PHARMACY IP CONSULT  INTERVENTIONAL RADIOLOGY ADULT/PEDS IP CONSULT  PHYSICAL THERAPY ADULT IP CONSULT  OCCUPATIONAL THERAPY ADULT IP CONSULT    Code Status   No CPR- Do NOT Intubate       The patient was discussed with Dr. Rigoberto LAMAS MD  [facility]  [address] [address]  Phone: [phone]  Fax: [phone]  ______________________________________________________________________    Physical Exam   Vital Signs: Temp: 98.2  F (36.8  C) Temp src: Axillary BP: 129/65 Pulse: 75   Resp: 18 SpO2: 92 % O2 Device: Nasal cannula Oxygen Delivery: 3  LPM  Weight: 114 lbs 12.8 oz    Constitutional: awake, alert, comfortable-appearing, sitting up in chair, no acute distress  HEENT: Lids and lashes normal, sclera clear, conjunctiva normal. Normocephalic and atraumatic. No rhinorrhea. Moist mucus membranes.   Respiratory: Patient breathing comfortably on 3-4L O2 via oxymask, no increased work of breathing or respiratory distress. No cough.  Cardiovascular: Skin is warm and well-perfused.  Skin: Bruising throughout extremities. Several chronic-appearing scabs/lesions and dry skin throughout bilateral lower extremities that appear to be well-healing. Raised, bruised area on dorsal aspect of patient's right foot that is tender to palpation but has no surrounding erythema or warmth with significantly improved swelling.  Musculoskeletal: Trace pitting edema of bilateral lower extremities.  Neurologic: Awake, alert, oriented to name, place and time. No apparent focal deficits.      Primary Care Physician   Pankaj Montanez    Discharge Orders      Primary Care - Care Coordination Referral      General info for SNF    Length of Stay Estimate: Short Term Care: Estimated # of Days <30  Condition at Discharge: Stable  Level of care: skilled   Rehabilitation Potential: Good  Admission H&P remains valid and up-to-date: Yes  Recent Chemotherapy: N/A  Use Nursing Home Standing Orders: Yes     Mantoux instructions    Give two-step Mantoux (PPD) Per Facility Policy Yes     Reason for your hospital stay    Acute on chronic hypoxic respiratory failure, likely multifactorial with COPD and CHF exacerbation     Follow Up and recommended labs and tests    Follow up with alf physician.  The following labs/tests are recommended: BMP, CBC.    Follow up with primary outpatient cardiologist.     Activity - Up with nursing assistance     General info for SNF    Length of Stay Estimate: Short Term Care: Estimated # of Days <30  Condition at Discharge: Stable  Level of care: Skilled    Rehabilitation Potential: Good  Admission H&P remains valid and up-to-date: Yes  Recent Chemotherapy: undergoing treatment with sotorasib, patient's daughter Leelee to manage this at TCU  Use Nursing Home Standing Orders: Yes     Mantoux instructions    Give two-step Mantoux (PPD) Per Facility Policy Yes     Follow Up and recommended labs and tests    Follow up with [facility] physician.  The following labs/tests are recommended: BMP.    Follow up with primary oncologist as planned.     Reason for your hospital stay    Acute on chronic hypoxic respiratory failure, right chest pain secondary to adrenal hemorrhage     Activity - Up with nursing assistance     No CPR- Do NOT Intubate     Physical Therapy Adult Consult    Evaluate and treat as clinically indicated.    Reason:  deconditioning     Occupational Therapy Adult Consult    Evaluate and treat as clinically indicated.    Reason:  deconditioning     Fall precautions     Fall precautions     Home Oxygen Order for DME - ONLY FOR DME    I, the undersigned, certify that the above prescribed supplies are medically necessary for this patient and is both reasonable and necessary in reference to accepted standards of medical and necessary in reference to accepted standards of medical practice in the treatment of this patient's condition and is not prescribed as a convenience.      Diet    Follow this diet upon discharge: Orders Placed This Encounter      Snacks/Supplements Adult: Ensure Enlive; Between Meals      Combination Diet Regular Diet Adult; 2 gm NA Diet; Low Saturated Fat Diet     Diet    Follow this diet upon discharge: Orders Placed This Encounter      Snacks/Supplements Adult: Ensure Enlive; Between Meals      Diet      Regular Diet Adult       Significant Results and Procedures   Most Recent 3 CBC's:  Recent Labs   Lab Test 10/05/23  0503 10/03/23  0533 10/02/23  0710   WBC 15.7* 13.0* 13.2*   HGB 10.1* 9.2* 10.2*   MCV 91 93 93    185 218     Most  Recent 3 BMP's:  Recent Labs   Lab Test 10/05/23  0503 10/04/23  1652 10/04/23  0949 10/03/23  0533     --  147* 144   POTASSIUM 3.6 3.8 3.1* 3.3*   CHLORIDE 107  --  109* 110*   CO2 27  --  28 27   BUN 24.3*  --  25.5* 30.3*   CR 1.21*  --  1.31* 1.43*   ANIONGAP 9  --  10 7   MESSI 8.3*  --  8.3* 8.1*   GLC 90  --  88 75     Most Recent 2 LFT's:  Recent Labs   Lab Test 10/02/23  0710 09/29/23  0608   AST 24 35   ALT <5 8   ALKPHOS 36 50   BILITOTAL 0.3 0.2     Most Recent 3 INR's:  Recent Labs   Lab Test 08/27/23  0101 06/17/23  1217 02/20/21  2040   INR 1.05 1.28* 1.16*   ,   Results for orders placed or performed during the hospital encounter of 09/23/23   XR Chest Port 1 View    Narrative    EXAM: XR CHEST PORT 1 VIEW  LOCATION: [facility]  DATE: 9/23/2023    INDICATION: shortness of breath, COPD vs CHF exacerbation  COMPARISON: 09/15/2023      Impression    IMPRESSION: Heart size is stable. Right midlung nodule measuring 2.5 cm, unchanged. Decrease in linear parenchymal opacity in the left midlung. Chronic interstitial scarring/fibrosis bilaterally. There is some increasing pleural thickening in the left   upper lobe. No pneumothorax. Old right rib fracture.   XR Foot Port Right 2 Views    Narrative    EXAM: XR FOOT PORT RIGHT 2 VIEWS  LOCATION: [facility]  DATE: 9/23/2023    INDICATION: pain, bruisng, swelling over 5th metatarsal  COMPARISON: None.      Impression    IMPRESSION: The right foot is negative for fracture. There is significant soft tissue swelling along the dorsal aspect of the foot. Cellulitis could also have this appearance.   XR Chest Port 1 View    Narrative    EXAM: XR CHEST PORT 1 VIEW  LOCATION: [facility]  DATE: 9/28/2023    INDICATION: right side chest pain  COMPARISON: 09/23/2023 and multiple prior studies, PET CT 08/11/2023 and older exams.      Impression    IMPRESSION: No interval change given shallower  inspiration. The extensive emphysema is better appreciated on the CT. Known, inferior right upper lobe pulmonary nodule again noted and overlies the right posterior seventh rib. Bandlike fibroatelectasis in   the left midlung and mild bibasilar fibrosis unchanged. No signs of acute pneumonia or failure. Heart and pulmonary vascularity are normal.   CT Chest Pulmonary Embolism w Contrast    Narrative    EXAM: CT CHEST PULMONARY EMBOLISM W CONTRAST  LOCATION: [facility]  DATE: 9/28/2023    INDICATION: Chest pain, dyspnea  COMPARISON: PET/CT from 08/11/2023  TECHNIQUE: CT chest pulmonary angiogram during arterial phase injection of IV contrast. Multiplanar reformats and MIP reconstructions were performed. Dose reduction techniques were used.   CONTRAST: 75ml Isovue 370    FINDINGS:  ANGIOGRAM CHEST: There is mixing artifact in the right lower lobe pulmonary artery on series 7 image 145. Pulmonary arteries are normal caliber and negative for pulmonary emboli. Thoracic aorta is negative for dissection.     LUNGS AND PLEURA: Slight interval decrease in the size of the peripheral right upper lobe pulmonary nodule measuring 1.9 x 1.9 cm compared to 2.3 x 2.1 cm previously (series 8 image 116). However, there is increased size of the 2 satellite nodules. For   example on series 8 image 131 measuring 0.8 x 0.6 mm compared to 0.6 x 0.4 cm. There is a satellite nodule just superior to this which is new or more conspicuous compared to prior exam measuring 1.0 x 0.9 cm (series 8 image 123). The satellite nodule   superior to the dominant right lobe nodule on series 8 image 91 is similar measuring 1.2 x 0.6 cm.There is a new satellite nodule in the right upper lobe on series 8 image 92 measuring 0.7 x 0.5 cm.    Advanced emphysema. Scattered areas of atelectasis and scarring. Similar pleural thickening and architectural distortion in the anterior left upper lobe/left lingula on series 8 image 118.  Moderate to large volume airway secretions particularly in the   right lung and right lower lobe with bronchial wall thickening, mucous plugging, with postobstructive consolidation and groundglass opacities. Mild bronchial wall thickening.    MEDIASTINUM/AXILLAE: Mild cardiomegaly. Mild reflux of contrast in the IVC and proximal hepatic veins. Persistent enlarged external adenopathy. A few of these are mildly increased in size compared to prior exam. The largest is in the lower paratracheal   chain measuring 2.1 x 1.5 cm on series 7 image 86 (previously 1.7 x 1.3 cm).    CORONARY ARTERY CALCIFICATION: Mild.    UPPER ABDOMEN: Ectasia of the infrarenal abdominal aorta measuring up to 2.8 cm. Extensive atherosclerosis.    MUSCULOSKELETAL: Degenerative changes of the spine. Chronic severe T12 compression deformity with associated focal kyphosis. No suspicious osseous lesions.      Impression    IMPRESSION:  1.  No pulmonary embolus.  2.  New manifestations of aspiration pneumonitis/pneumonia in the right lower lobe with moderate to large volume airway secretions.  3.  Slightly decreased size of the dominant nodule within the right upper lobe.  4.  However, there are a few new or increasing satellite nodules in the right lung. Additionally, interval increase in the size of several of the mediastinal lymph nodes. Continued attention on follow-up.  5.  Advanced emphysema.   US Abdomen Limited*    Narrative    EXAM: US ABDOMEN LIMITED  LOCATION: [facility]  DATE: 9/28/2023    INDICATION: Acute onset right upper quadrant pain radiating to the back.  COMPARISON: PET/CT 08/11/2023.  TECHNIQUE: Limited abdominal ultrasound.    FINDINGS:    GALLBLADDER: No gallstones, wall thickening, or pericholecystic fluid. Negative sonographic Álvarez's sign.    BILE DUCTS: No biliary dilatation. The common duct measures 2 mm.    LIVER: Normal parenchyma with smooth contour. No focal mass.    RIGHT KIDNEY: No  hydronephrosis.    PANCREAS: The visualized portions are normal.    No ascites. Incompletely seen right upper quadrant mass, measuring at least 5.5 cm.      Impression    IMPRESSION:    1.  Right upper quadrant mass (possibly adrenal in origin), suspicious for metastatic disease. Recommend contrast enhanced CT abdomen - pelvis.     2.  No cholelithiasis. Gallbladder wall thickness is exaggerated by partial distention of the gallbladder. Accounting for this, no definitive evidence for acute cholecystitis.    3.  Remaining exam unremarkable.     CT Abdomen Pelvis w Contrast    Narrative    EXAM: CT ABDOMEN PELVIS W CONTRAST  LOCATION: [facility]  DATE: 9/29/2023    INDICATION: acute onset right upper quadrant abdominal pain  COMPARISON: Ultrasound 9/28/2023  TECHNIQUE: CT scan of the abdomen and pelvis was performed following injection of IV contrast. Multiplanar reformats were obtained. Dose reduction techniques were used.  CONTRAST: 53ml Isovue 370    FINDINGS:   LOWER CHEST: Tiny right pleural effusion with associated right lower lobe airspace opacities and 0.3 cm middle lobe nodule, unchanged compared to 9/28/2023    HEPATOBILIARY: Normal.    PANCREAS: Normal.    SPLEEN: Normal.    ADRENAL GLANDS: 5.7 x 3.5 cm right adrenal nodule with surrounding high attenuation that extends inferiorly along the right kidney. 1.8 x 1.4 cm left adrenal nodule (3/53). Both are new compared to PET/CT of 8/11/2023.    KIDNEYS/BLADDER: No significant mass, stones, or hydronephrosis. There are simple or benign cysts. No follow up is needed. Circumferential bladder wall thickening.    BOWEL: Normal.    LYMPH NODES: No lymphadenopathy.    VASCULATURE: Moderate multifocal atherosclerotic calcification of the abdominal aorta and iliofemoral arteries.    PELVIC ORGANS: Normal.    MUSCULOSKELETAL: Osteopenia and degenerative changes in the spine with dextroconvex curvature of the lumbar spine. Moderate T12  compression deformity is unchanged. Left hip surgical hardware. No aggressive or destructive osseous lesions. Partially imaged   subcutaneous nodule in the right lower back posteriorly.      Impression    IMPRESSION:   1.  Bilateral adrenal nodules are new compared to 8/11/2023 and concerning for metastases. There is high attenuation fluid associated with the right adrenal nodule, which is likely hemorrhage and likely accounts for acute right upper quadrant pain.   Hemorrhage extends inferiorly in the retroperitoneum along the right kidney.    2.  Tiny right pleural effusion with associated airspace opacities.     *Note: Due to a large number of results and/or encounters for the requested time period, some results have not been displayed. A complete set of results can be found in Results Review.       Discharge Medications   Current Discharge Medication List        START taking these medications    Details   amoxicillin-clavulanate (AUGMENTIN) 500-125 MG tablet Take 1 tablet by mouth every 12 hours  Qty: 1 tablet, Refills: 0    Associated Diagnoses: Acute pneumonia      bumetanide (BUMEX) 1 MG tablet Take 1 tablet (1 mg) by mouth 2 times daily  Qty: 60 tablet, Refills: 0    Associated Diagnoses: Heart failure, systolic, acute on chronic (H)      gabapentin (NEURONTIN) 300 MG capsule Take 1 capsule (300 mg) by mouth at bedtime  Qty: 30 capsule, Refills: 0    Associated Diagnoses: Wedge compression fracture of t11-T12 vertebra, subsequent encounter for fracture with routine healing      predniSONE (DELTASONE) 10 MG tablet Take 1 tablet (10 mg) by mouth daily  Qty: 5 tablet, Refills: 0    Associated Diagnoses: Acute respiratory failure with hypoxia (H)           CONTINUE these medications which have CHANGED    Details   metoprolol succinate ER (TOPROL XL) 50 MG 24 hr tablet Take 1 tablet (50 mg) by mouth At Bedtime  Qty: 30 tablet, Refills: 0    Associated Diagnoses: Heart failure, systolic, acute on chronic (H)       traMADol (ULTRAM) 50 MG tablet Take 1 tablet (50 mg) by mouth 2 times daily as needed for severe pain  Qty: 60 tablet, Refills: 0    Associated Diagnoses: Right upper quadrant pain           CONTINUE these medications which have NOT CHANGED    Details   acetaminophen (TYLENOL) 500 MG tablet Take 1,000 mg by mouth every 8 hours as needed for mild pain or fever       acetylcysteine (MUCOMYST) 10 % nebulizer solution Inhale 4 mLs into the lungs every 4 hours as needed for mucolysis/respiratory distress      albuterol (PROAIR HFA/PROVENTIL HFA/VENTOLIN HFA) 108 (90 Base) MCG/ACT inhaler Inhale 2 puffs into the lungs every 6 hours as needed for shortness of breath, wheezing or cough      Azelastine HCl 137 MCG/SPRAY SOLN Spray 1 spray into both nostrils 2 times daily as needed for rhinitis      benzonatate (TESSALON PERLES) 100 MG capsule Take 1 capsule (100 mg) by mouth 3 times daily as needed for cough  Qty: 90 capsule, Refills: 3    Associated Diagnoses: Pulmonary emphysema, unspecified emphysema type (H)      CALCIUM-MAGNESIUM-ZINC PO Take 1 tablet by mouth daily      cetirizine (ZYRTEC) 5 MG tablet Take 1 tablet (5 mg) by mouth daily as needed for allergies    Associated Diagnoses: Seasonal allergic rhinitis, unspecified trigger      chlorhexidine (PERIDEX) 0.12 % solution [CHLORHEXIDINE (PERIDEX) 0.12 % SOLUTION] Apply 15 mL to the mouth or throat at bedtime as needed.       escitalopram (LEXAPRO) 10 MG tablet Take 1 tablet (10 mg) by mouth daily  Qty: 90 tablet, Refills: 3    Comments: Dose increase  Associated Diagnoses: Anxiety      fluticasone (FLONASE) 50 MCG/ACT nasal spray Spray 1 spray into both nostrils daily as needed for rhinitis or allergies      Fluticasone-Umeclidin-Vilanterol (TRELEGY ELLIPTA) 200-62.5-25 MCG/INH oral inhaler Inhale 1 puff into the lungs At Bedtime      furosemide (LASIX) 80 MG tablet Take 1 tablet (80 mg) by mouth 2 times daily  Qty: 28 tablet, Refills: 0    Associated Diagnoses:  Nonischemic cardiomyopathy (H)      ipratropium (ATROVENT HFA) 17 MCG/ACT inhaler Inhale 2 puffs into the lungs every 6 hours as needed for wheezing      ipratropium - albuterol 0.5 mg/2.5 mg/3 mL (DUONEB) 0.5-2.5 (3) MG/3ML neb solution Take 1 vial by nebulization every 6 hours as needed for shortness of breath, wheezing or cough      Melatonin 5 MG TBDP Take 15 mg by mouth At Bedtime      mirabegron (MYRBETRIQ) 25 MG 24 hr tablet Take 1 tablet (25 mg) by mouth daily  Qty: 30 tablet, Refills: 0    Associated Diagnoses: Urinary frequency      multivitamin w/minerals (THERA-VIT-M) tablet Take 1 tablet by mouth daily      nystatin (MYCOSTATIN) 947979 UNIT/GM external cream Apply topically 3 times daily as needed for dry skin      OLANZapine (ZYPREXA) 5 MG tablet Take 1 tablet (5 mg) by mouth At Bedtime  Qty: 30 tablet, Refills: 3    Associated Diagnoses: Insomnia due to medical condition      prochlorperazine (COMPAZINE) 10 MG tablet Take 1 tablet (10 mg) by mouth every 6 hours as needed (Nausea/Vomiting)  Qty: 30 tablet, Refills: 5    Associated Diagnoses: Malignant neoplasm of upper lobe of right lung (H)      rivaroxaban ANTICOAGULANT (XARELTO) 15 MG TABS tablet Take 1 tablet (15 mg) by mouth At Bedtime      simvastatin (ZOCOR) 40 MG tablet Take 1 tablet (40 mg) by mouth At Bedtime  Qty: 90 tablet, Refills: 3    Associated Diagnoses: Hypercholesterolemia      sodium chloride 0.9 % neb solution Take 3 mLs by nebulization every 3 hours as needed for wheezing      solifenacin (VESICARE) 5 MG tablet Take 1 tablet (5 mg) by mouth daily  Qty: 90 tablet, Refills: 3    Associated Diagnoses: Urinary frequency      spironolactone (ALDACTONE) 25 MG tablet Take 1 tablet (25 mg) by mouth daily  Qty: 30 tablet, Refills: 0    Associated Diagnoses: Heart failure with reduced ejection fraction (H)      tamoxifen (NOLVADEX) 20 MG tablet Take 1 tablet (20 mg) by mouth daily  Qty: 90 tablet, Refills: 3    Associated Diagnoses:  Malignant neoplasm of central portion of left breast in female, estrogen receptor positive (H)      vitamin D3 (CHOLECALCIFEROL) 50 mcg (2000 units) tablet Take 50 mcg by mouth daily      zinc oxide (DESITIN) 40 % external ointment Apply topically as needed for dry skin or irritation (Twice a day to gluteal area skin irritation)  Qty: 113 g, Refills: 1    Associated Diagnoses: Intertrigo      famotidine (PEPCID) 20 MG tablet Take 1 tablet (20 mg) by mouth every other day  Qty: 90 tablet, Refills: 0    Associated Diagnoses: COPD exacerbation (H); Pulmonary emphysema, unspecified emphysema type (H)      sotorasib (LUMAKRAS) 120 MG tablet Take 6 tablets (720 mg) by mouth daily for 30 days Do not chew, crush or split tablets.  Qty: 180 tablet, Refills: 0    Comments: Dose decrease  Associated Diagnoses: Malignant neoplasm of upper lobe of right lung (H)           STOP taking these medications       gabapentin (NEURONTIN) 600 MG tablet Comments:   Reason for Stopping:         HYDROmorphone, STANDARD CONC, (DILAUDID) 1 MG/ML oral solution Comments:   Reason for Stopping:             Allergies   Allergies   Allergen Reactions    Sulfa (Sulfonamide Antibiotics) [Sulfa Antibiotics] Rash     Headaches and dizziness.    Homeopathic Drugs [External Allergen Needs Reconciliation - See Comment] Unknown     runny nose    Mold Extracts [Molds & Smuts] Unknown    Morphine (Pf) [Morphine] Unknown     hallucinate    Lisinopril Itching, Cough and Unknown     cough    Sulfacetamide Sodium [Sulfacetamide] Rash

## 2023-10-05 NOTE — PLAN OF CARE
Goal Outcome Evaluation:  Patient alert and oriented, reported some pain post therapies with coughing and in bilateral legs, given prn Tramadol with relief per patient.  Patient's bilateral legs ecchymotic, milana with scabs.  Patient's legs elevated on pillows.  Patient up with 1 assist, gait belt, walker.  Patient requiring oxygen increase to 5L per nasal cannula with activity, at rest 3L per nasal cannula.  Patient declined IS.  PRN inhaler used for coarse lung sounds.  Patient's daughter had concerns regarding discharge, spoke with SW and clarified questions.  Report called to [facility] at 1250 and report given to Padmini SULLIVAN, no further questions.    NURSING DISCHARGE NOTE  Patient discharged to transitional care unit at 1:02 PM via wheel chair transport with oxygen. Accompanied by daughter and staff. Discharge instructions reviewed with patient and daughter, opportunity offered to ask questions.  Patient's daughter to provide immunologics per CM.  All belongings sent with patient.  Completed discharge packet with written/signed prescription sent with transport to TCU.       Lori Frank RN

## 2023-10-05 NOTE — PROGRESS NOTES
[facility]  Palliative Care Daily Progress Note       Recommendations & Counseling        GOALS OF CARE:   Currently goals are life prolonging with limits-DNR/DNI.    No longer having adrenal node biopsy and will follow up with imaging in the coming weeks.   Wants to get stronger at TCU and return to her home.   Stephanie has a strong merline and identifies as [employer].   Palliative care will continue to follow.  Recommend to continue to follow with outpatient palliative care if not signing on with hospice.     ADVANCE CARE PLANNING:  No health care directive on file.    POLST on file.  Currently POLST documents DNR and selective medical treatments.  Code status: No CPR- Do NOT Intubate     MEDICAL MANAGEMENT:   # Shortness of breath secondary to advanced COPD with acute exacerbation, CHF-improving  -Currently managed by primary team and cardiology     #Generalized weakness and fatigue secondary to the above   -PT/OT following, recommend TCU     #history of Chronic pain 2/2 compression fracture  -Appreciate pain team recommendations.  Agree with transition to tramadol 25-50 mg po every 8 hours PRN, and tylenol PRN.       #Insomnia  Continues with zyprexa 5 mg at HS and melatonin 10 mg at HS PRN  -Would use caution with adding any other medications that can prolong QTC.  Would avoid benzodiazepine due to potential adverse effects.  If goals of care transition to comfort focused, then weigh risk vs benefit.            Assessments          Irene León is a 78 year old female with history of nonischemic cardiomyopathy LVEF 20-25%, chronic heart failure with reduced ejection fraction, minimal coronary disease per angiogram, paroxysmal atrial fibrillation, lung cancer, COPD, chronic hypoxic respiratory failure on 3 L of home O2, chronic kidney disease who presented to the ED on 9/23/23 with shortness of breath.  Admitted to the hospital with acute on chronic hypoxic respiratory failure, COPD  exacerbation, CHF with EF 20-25%.  Cardiology following, patient currently on IV bumex but held due to hypotension.  Oxygen needs are 5 lpm which is higher than her home baseline.      Recurrent hospitalizations-this is her 5th admission since 6/2023.   She is followed in outpatient palliative care clinic, last seen on 8/15/23.       Today, the patient was seen for:  Goals of care and sx management              Interval History:     Chart review/discussion with unit or clinical team members:   No significant overnight events    Per patient or family/caregivers today:  Patient alert, oriented X 4.    Continues to work with PT/OT and wants to discharge to TCU    Key Palliative Symptoms:  # Pain severity the last 12 hours: none  # Dyspnea severity the last 12 hours: none  # Nausea severity the last 12 hours: none  # Anxiety severity the last 12 hours: low             Review of Systems:     Besides above, an additional 4 point system ROS was reviewed and is unremarkable          Medications:     I have reviewed this patient's medication profile and medications during this hospitalization.           Physical Exam:   Vital Signs: Blood pressure 129/65, pulse 75, temperature 98.2  F (36.8  C), temperature source Axillary, resp. rate 18, weight 52.1 kg (114 lb 12.8 oz), SpO2 91 %, not currently breastfeeding.   GENERAL: Alert, sitting up in chair, dressed in street clothes, NAD    SKIN: Warm and dry   LUNGS: breathing non-labored   NEUROLOGIC: Alert and oriented X 4                Data Reviewed:       Results for orders placed or performed during the hospital encounter of 09/23/23   XR Chest Port 1 View    Impression    IMPRESSION: Heart size is stable. Right midlung nodule measuring 2.5 cm, unchanged. Decrease in linear parenchymal opacity in the left midlung. Chronic interstitial scarring/fibrosis bilaterally. There is some increasing pleural thickening in the left   upper lobe. No pneumothorax. Old right rib fracture.    XR Foot Port Right 2 Views    Impression    IMPRESSION: The right foot is negative for fracture. There is significant soft tissue swelling along the dorsal aspect of the foot. Cellulitis could also have this appearance.   XR Chest Port 1 View    Impression    IMPRESSION: No interval change given shallower inspiration. The extensive emphysema is better appreciated on the CT. Known, inferior right upper lobe pulmonary nodule again noted and overlies the right posterior seventh rib. Bandlike fibroatelectasis in   the left midlung and mild bibasilar fibrosis unchanged. No signs of acute pneumonia or failure. Heart and pulmonary vascularity are normal.   CT Chest Pulmonary Embolism w Contrast    Impression    IMPRESSION:  1.  No pulmonary embolus.  2.  New manifestations of aspiration pneumonitis/pneumonia in the right lower lobe with moderate to large volume airway secretions.  3.  Slightly decreased size of the dominant nodule within the right upper lobe.  4.  However, there are a few new or increasing satellite nodules in the right lung. Additionally, interval increase in the size of several of the mediastinal lymph nodes. Continued attention on follow-up.  5.  Advanced emphysema.   US Abdomen Limited*    Impression    IMPRESSION:    1.  Right upper quadrant mass (possibly adrenal in origin), suspicious for metastatic disease. Recommend contrast enhanced CT abdomen - pelvis.     2.  No cholelithiasis. Gallbladder wall thickness is exaggerated by partial distention of the gallbladder. Accounting for this, no definitive evidence for acute cholecystitis.    3.  Remaining exam unremarkable.     CT Abdomen Pelvis w Contrast    Impression    IMPRESSION:   1.  Bilateral adrenal nodules are new compared to 8/11/2023 and concerning for metastases. There is high attenuation fluid associated with the right adrenal nodule, which is likely hemorrhage and likely accounts for acute right upper quadrant pain.   Hemorrhage extends  inferiorly in the retroperitoneum along the right kidney.    2.  Tiny right pleural effusion with associated airspace opacities.       Recent Labs   Lab 10/05/23  0503 10/04/23  1652 10/04/23  0949 10/03/23  0533 10/02/23  0710 09/30/23  0506 09/29/23  0608   WBC 15.7*  --   --  13.0* 13.2*   < > 22.7*   HGB 10.1*  --   --  9.2* 10.2*   < > 11.4*   MCV 91  --   --  93 93   < > 95     --   --  185 218   < > 215     --  147* 144 147*   < > 139   POTASSIUM 3.6 3.8 3.1* 3.3* 3.8   < > 4.4   CHLORIDE 107  --  109* 110* 111*   < > 106   CO2 27  --  28 27 28   < > 23   BUN 24.3*  --  25.5* 30.3* 36.1*   < > 33.4*   CR 1.21*  --  1.31* 1.43* 1.73*   < > 1.54*   ANIONGAP 9  --  10 7 8   < > 10   MESSI 8.3*  --  8.3* 8.1* 8.8   < > 8.9   GLC 90  --  88 75 77   < > 104*   ALBUMIN  --   --   --   --  3.0*  --  2.9*   PROTTOTAL  --   --   --   --  6.3*  --  6.6   BILITOTAL  --   --   --   --  0.3  --  0.2   ALKPHOS  --   --   --   --  36  --  50   ALT  --   --   --   --  <5  --  8   AST  --   --   --   --  24  --  35    < > = values in this interval not displayed.      Lab Results   Component Value Date    WBC 15.7 (H) 10/05/2023    HGB 10.1 (L) 10/05/2023    HCT 32.2 (L) 10/05/2023     10/05/2023     10/05/2023    POTASSIUM 3.6 10/05/2023    CHLORIDE 107 10/05/2023    CO2 27 10/05/2023    BUN 24.3 (H) 10/05/2023    CR 1.21 (H) 10/05/2023    GLC 90 10/05/2023    NTBNP 6,037 (H) 09/23/2023    AST 24 10/02/2023    ALT <5 10/02/2023    ALKPHOS 36 10/02/2023    BILITOTAL 0.3 10/02/2023    INR 1.05 08/27/2023      Lab Results   Component Value Date    ALBUMIN 3.0 10/02/2023    ALBUMIN 3.1 08/27/2023                ====================================================  TT: I have personally spent a total of 25 minutes on the day of the encounter on the unit in review of medical record, consultation with the medical providers and assessment of patient today, with time spent in counseling, coordination of care,  and discussion with patient re: diagnostic results, symptom management, and development of plan of care as noted above.      ====================================================    Sahara Mercedes CNS  [facility], Palliative Care  Securely message with the [employer] Web Console (learn more here) or  Text page via [employer] Paging/Directory

## 2023-10-05 NOTE — PROGRESS NOTES
Patient alert and oriented. VSS. Patient denies pain. Patient was in chair all of Rns shift. On 3L O2 saturations 91-93%. Family bedside.

## 2023-10-05 NOTE — PLAN OF CARE
Problem: Plan of Care - These are the overarching goals to be used throughout the patient stay.    Goal: Optimal Comfort and Wellbeing  Outcome: Progressing   Goal Outcome Evaluation:  [id number]  Pt A&Ox4. VSS on 3L O2. Pt denied pain. Was up in chair for the four hours. Generalized bruising on extremities. Up with assist of 1 with gait belt and walker. Pt on K+ protocol, is 3.8, recheck in AM.

## 2023-10-05 NOTE — PROGRESS NOTES
[facility] Hematology and Oncology Inpatient Progress Note    Patient: rIene León  MRN: [id number]  Date of Service: 10/4/2023          Reason for Visit    #. Right lung cancer    Assessment and Plan     Cancer Staging   Invasive ductal carcinoma of breast, female, left (H)  Staging form: Breast, AJCC 8th Edition  - Pathologic stage from 6/20/2017: Stage IA (pT1b, pN0(sn), cM0, G1, ER+, UT+, HER2-, Oncotype DX score: 6) - Signed by Devon Suarez MD on 7/4/2022      ECOG Performance Status: 3    #.  Right upper lobe lung cancer with adjacent satellite nodule and thoracic lymph node metastasis, K-suzi G12 C mutated.  #.  New bilateral adrenal nodule concerning for metastatic lesions.  #.  History of invasive ductal carcinoma of the left breast, stage Ia in 2017.  ER/UT positive, HER2 negative.  Postsurgery, followed by 5 years of adjuvant tamoxifen.  #.  Severe COPD and chronic hypoxic respiratory failure  #.  Congestive heart failure with reduced EF   #. CKD- 3b  #. Poor performance status  #.  Grievance     She restarted 720 mg (6 tablets) daily for 3 days.  No side effects from current dose of sotorasib.  I sent refill to her daughter house.   I discussed with Dr. Mackenzie from radiology today.  He felt that right adrenal nodule biopsy may not yield diagnostic result due to a mix of hematoma and solid tumor.  Left adrenal nodule is is not easily assessable for biopsy.  He is suggested to monitor and follow-up CT scan in about 4-6 weeks.  Once bleeding/hematoma from right adrenal gland is resolved, we can attempt biopsy at that point.  Then follow-up biopsy of the adrenal gland was canceled.  She is advised to resume Eliquis.   She is likely to be discharged to TCU soon.  I will arrange outpatient follow-up once she is released from hospital.    ______________________________________________________________________________    History of Present Illness    Her sister and daughter were present at  bedside.  She does not have any new concerns today.  No diarrhea.  No nausea or vomiting.  No intolerable side effects from sotorasib.    Review of Systems    Apart from describing in HPI, the remainder of comprehensive ROS was negative.    Physical Exam    BP (!) 151/70   Pulse 83   Temp 98.2  F (36.8  C) (Oral)   Resp 18   Wt 51.8 kg (114 lb 3.2 oz)   SpO2 91%   BMI 23.02 kg/m      General: alert, awake, not in acute distress.  Sitting comfortably in a chair.  She wears oxygen by nasal cannula.  HEENT: Head: Normal, normocephalic, atraumatic.  Eye: Normal external eye, conjunctiva, lids cornea, CATINA.  Pharynx: Normal buccal mucosa. Normal pharynx.  Neck / Thyroid: Supple, no masses, nodes, nodules or enlargement.  Lymphatics: No abnormally enlarged lymph nodes.  Extremities: normal strength, tone, and muscle mass  Skin: Ecchymosis in extremities.    CNS: non focal.     Lab Results    Recent Results (from the past 24 hour(s))   Basic metabolic panel    Collection Time: 10/04/23  9:49 AM   Result Value Ref Range    Sodium 147 (H) 135 - 145 mmol/L    Potassium 3.1 (L) 3.4 - 5.3 mmol/L    Chloride 109 (H) 98 - 107 mmol/L    Carbon Dioxide (CO2) 28 22 - 29 mmol/L    Anion Gap 10 7 - 15 mmol/L    Urea Nitrogen 25.5 (H) 8.0 - 23.0 mg/dL    Creatinine 1.31 (H) 0.51 - 0.95 mg/dL    GFR Estimate 42 (L) >60 mL/min/1.73m2    Calcium 8.3 (L) 8.8 - 10.2 mg/dL    Glucose 88 70 - 99 mg/dL   Potassium    Collection Time: 10/04/23  4:52 PM   Result Value Ref Range    Potassium 3.8 3.4 - 5.3 mmol/L        Imaging    No results found.     Signed by: Devon Suarez MD

## 2023-10-05 NOTE — PROGRESS NOTES
Physical Therapy Discharge Summary    Reason for therapy discharge:    Discharged to transitional care facility.    Progress towards therapy goal(s). See goals on Care Plan in [facility] electronic health record for goal details.  Goals not met.  Barriers to achieving goals:   discharge from facility.    Therapy recommendation(s):    Continued therapy is recommended.  Rationale/Recommendations:  Continued strengthening and mobility practice.

## 2023-10-05 NOTE — PROGRESS NOTES
"Care Management Follow Up    Length of Stay (days): 11    Expected Discharge Date: 10/05/2023     Concerns to be Addressed:      TCU placement concerns     Additional Information:  Writer was asked to look into the status of pt's TCU placement by bedside RN. Writer briefly reviewed the chart and then met with pt, her daughter Leelee, and another family member. Leelee states she was told by a doctor that a different pt had their placement fall through at [facility]CU() and it may have opened the bed for the pt. Leelee stated she was comforted by this and expected a call from Care Management(CM) today 10/4 to discuss the open TCU bed. Leelee states she strongly expressed her concern to  on 10/3 that ST was the preferred placement for several reasons. Leelee reports she was promised by CM on 10/3 she would receive a call on 10/4 to update family on potential placement at . Leelee states she had not heard from CM all day and is upset that the bed may have been missed. Leelee states she will be even more upset if/when she discovers the bed may have been provided to the pt if CM had done more to attempt coordination.    Writer apologized for any miscommunication and any potential fault on the part of CM. Pt and her family were provided with the difficulties of placement on the CM end of things. Pt and her family were explain by writer, the complexity on the TCU end with processing multiple referrals each day and prioritizing pt transfers. CM did send referral 10/3 per documentation. No available notes 10/4 to provide insight on placement/denial at . Writer has called  admissions and left a voicemail requesting call-back as soon as possible in the morning on 10/5.    Writer updated CM Sticky Note. Previous CM note as follows - \"Assessed.  PAS done.  Hem/Onc planning on biopsies multiple sites(per daughter's agreement)  CM spoke to daughterLeelee re: patient Immunotherapy medication Lumakras and using at [facility] " "TCU.  Leelee is NOT aware writer spoke to [facility] TCU and this facility unable to provide Immunotherapy med so daughter would be able to bring into TCU. Referral sent again [facility] per daughter's request.\" PAS to be updated if placement changes from current accepted TCU at [facility].    Pt had thought to need to be hospitalized for multiple biopsies that have now been canceled and pt is currently ready for discharge/TCU placement when a facility is found. CM to continue to attempt TCU placement while honoring the pt and her family's wishes as able. Pt and family were understanding of writer's explanation for potential misunderstandings and were thankful for the time and effort provided. Bedside RN updated on current status and actions writer has taken.    Mario Cantu RN      "

## 2023-10-05 NOTE — PROGRESS NOTES
"Care Management Discharge Note    Discharge Date: 10/05/2023       Discharge Disposition: Acute Rehab    Discharge Services: Transportation Services    Discharge DME: Oxygen    Discharge Transportation: family or friend will provide    Private pay costs discussed: transportation costs    Does the patient's insurance plan have a 3 day qualifying hospital stay waiver?  No    PAS Confirmation Code: [id number]  Patient/family educated on Medicare website which has current facility and service quality ratings: yes    Education Provided on the Discharge Plan: Yes  Persons Notified of Discharge Plans: yes  Patient/Family in Agreement with the Plan: yes      Additional Information:  Writer met with patient who reports\"I am ready to go and get stronger so I can go home and see my dog Brittni\"  Patient appears calm and A&O x4.  Patient agrees  transport today [facility] 12:37-1:22.  Will transport requiring 3L oxygen [place].    Writer called [facility] again to confirm if any possibility bed availability. Writer called x2 w/no return call.  Per [facility], patient was declined 10/4 due to no bed availability.      Tiffanie [facility] returned call to writer and confirms unfortunately no bed available at  [facility].    PAS [id number].    BFM team updated to please place orders and transport time.    Daughter, Leelee, updated discharge plan today [facility]-offers no complaint re: this facility.  Leelee is aware [facility] has no bed availability.  Leelee also reminded to bring patient's immunotherapy to [facility]    Discharge orders will need sent [facility].    Renata Mane CM      "

## 2023-10-05 NOTE — DISCHARGE SUMMARY
[facility]  Discharge Summary - Medicine & Pediatrics       Date of Admission:  9/23/2023  Date of Discharge:  10/5/23  Discharging Provider: Dr. Isma Franklin  Discharge Service: Hospitalist Service    Discharge Diagnoses   Patient Active Problem List   Diagnosis    Left hip pain    Nonischemic cardiomyopathy (H)    COPD (chronic obstructive pulmonary disease) (H)    Benign essential hypertension    Chronic kidney disease, stage IV (severe) (H)    Factor 5 Leiden mutation, heterozygous (H24)    Senile osteoporosis    COPD exacerbation (H)    Community acquired pneumonia of left upper lobe of lung    Acute on chronic diastolic congestive heart failure (H)    Atrial fibrillation with rapid ventricular response (H)    Weight loss    Acute on chronic respiratory failure with hypoxia (H)    Prolonged Q-T interval on ECG    Neutrophilia    Abscess of upper lobe of left lung with pneumonia (H)    Invasive ductal carcinoma of breast, female, left (H)    ACP (advance care planning)    Anisocoria    At high risk for impaired skin integrity    Trochanteric bursitis    Chronic anticoagulation    Chronic systolic congestive heart failure (H)    Closed fracture of hip with delayed healing    Compression fracture of L1 vertebra, sequela    Depression with anxiety    Dyslipidemia    Dyspnea    Exacerbation of intermittent asthma, unspecified asthma severity    Family history of blood disease    Fibrocystic breast    Fissure in skin of foot    Fracture of femur (H)    Gait disorder    GERD (gastroesophageal reflux disease)    Herniated intervertebral disc    Groin pain    Herpes zoster without complication    High frequency sensorineural hearing loss of left ear    Patient's other noncompliance with medication regimen    Hot flashes due to tamoxifen    Hot flashes, menopausal    Hyperlipidemia    Hypoxia    Idiopathic edema    Insomnia    Malignant neoplasm of central portion of left female breast  (H)    Metabolic encephalopathy    Unspecified asthma, uncomplicated    Muscle weakness (generalized)    OAB (overactive bladder)    Osteoarthritis of hip    Osteoporosis    Other abnormalities of gait and mobility    Other chronic sinusitis    Pain due to fracture    Personal history of malignant neoplasm of breast    Personal history of transient ischemic attack (TIA), and cerebral infarction without residual deficits    Physical deconditioning    Piriformis syndrome    Post menopausal syndrome    Pulsatile tinnitus of both ears    S/P hip replacement, left    Sacro-iliac pain    Sacral insufficiency fracture    T12 compression fracture (H)    Tendonitis    Urge incontinence    Urinary incontinence    Wedge compression fracture of t11-T12 vertebra, subsequent encounter for fracture with routine healing    Heart failure with reduced ejection fraction (H)    Leukocytosis, unspecified type    Acute pneumonia    Pedal edema    Chronic combined systolic and diastolic congestive heart failure (H)    Pneumonia of left upper lobe due to infectious organism    Malignant neoplasm of upper lobe of right lung (H)    Heart failure, systolic, acute on chronic (H)       Clinically Significant Risk Factors          Follow-ups Needed After Discharge      Follow up with [facility] physician.  The following labs/tests are   recommended: BMP.    Follow up with primary oncologist as planned.      Follow up with primary outpatient cardiologist.         Discharge Disposition   Discharged to home  Condition at discharge: Stable    Hospital Course   Irene León is a 78 year old female admitted on 9/23/2023. She has a history of CHF with reduced ejection fraction (most recently EF 20-25% on 6/22), cardiomyopathy, severe COPD with chronic home oxygen 3 L at rest and 5-6 L with exertion, CAD, paroxysmal A-fib on Xarelto, CKD stage IV, lung cancer on oral chemo and breast cancer on oral chemo who is admitted for acute hypoxic  respiratory failure likely multifactorial related to COPD and CHF. 9/28/23 showing new onset aspiration pneumonitis/pneumonia in the right lower lobe with moderate to large volume airway secretions; discharge was delayed, Zosyn was started. 9/29/23 CT showing new finding of bilateral adrenal nodules concerning for metastases, with likely hemorrhage.      Initial Hospital Course (9/23/23-9/28/23):  Patient with history of severe COPD on home oxygen at 3 LPM. Acutely SOB at home on day of admission requiring BiPAP by EMS. VBG on arrival with pH 7.42 with CO2 of 56. Recevied duonebs x2 and solumedrol in ED, transitioned to 5L O2 via oxymask where she stabilized. Early 9/26/23 AM, her oxygen requirement decreased back down to 3 LPM. It has since fluctuated between 3-5 LPM. In addition, increased LE edema was noted on admission. Cardiology was consulted for transition from Lasix to Bumex. There was good resolution of LE edema by day of discharge, and patient reported good tolerance of the Bumex. Throughout hospitalization, patient remained afebrile with no new fevers, chills, worsening cough, or worsening SOB. She did receive short course of IV ceftriaxone (9/23-9/24) and a full course of doxycycline (9/23-9/28). She also received 5-day course of prednisone while hospitalized, will continue with taper. Of note, patient's  and primary caregiver passed away in the ICU during this same hospitalization.     Subsequent Hospital Course (9/28/23-10/5/23):  Approximately 5 minutes before discharge to TCU on 9/28/23, patient developed acute onset of severe right-sided chest pain near the right breast, under the rib cage. EKG, CXR were obtained and unremarkable. Pain was not improved by nitroglycerin or aspirin administration. Patient reported diaphoresis and nausea as well with 2x episodes of bilious vomit. CTPE showed aspiration pneumonia but no PE. CT A/P performed 9/29/23 showed right adrenal nodule hemorrhage as likely  cause of her pain. In addition, it showed bilateral adrenal nodules suspected to be new metastasis. Pain improved significantly in the following days and essentially resolved. Hem/Onc and IR saw/discussed patient case regarding likely new metastasis to adrenal glands. Biopsy was deferred but patient did elect to resume sotorasib for cancer treatment. Patient's condition has remained stable with no new or worsening pain, SOB, cough, or LE edema.    The following problems were addressed during her hospitalization:    R sided chest pain   New R. Adrenal Mass, hemorrhage  Invasive ductal carcinoma of the breast  right upper lobe pulmonary lung cancer  Acute onset R chest pain 9/29/23. EKG, CXR, RUQ were obtained and unremarkable. CTPE did show new aspiration pneumonia. CT abdomen/pelvis which showed right adrenal nodule hemorrhage, concern for metastasis in the setting of known invasive ductal carcinoma of the breast and right upper lobe pulmonary lung cancer.  - Pain team consulted:              -lidocaine patch x 2 daily              -discontinue Toradol               -decrease gabapentin to 300 mg at bedtime given risk of worsening peripheral edema              -scheduled APAP 975 mg TID              -hydroxyzine 50 mg TID PRN              -Dilaudid 1-2 mg PO q4hr PRN first line, Dilaudid 0.2 mg IV q2hr PRN second line              -add senna/doc x 1 PO BID, MiraLAX PRN, bisacodyl PRN  -Hemonc consulted:  -Pain control  -Avoid NSAIDs  -Continue Xarelto  -Resuming sotorasib 720 mg daily  - General Surgery consult:   -Recommend conservative management, serial CBC, consider discontinuing xarelto, holding toradol if concern for continued bleeding  -If evidence of continued bleeding despite conservative measures, could consider IR intervention, however thorough discussion of GOC would be encouraged  -Ms. León is not a surgical candidate, and would not tolerate a general anesthetic should she need a surgical  intervention.  -signed off   - Palliative team following  - Holding off on biopsy for now per IR and Oncology     Aspiration pneumonia  Leukocytosis, improving   Possible COPD exacerbation  History of severe COPD on home oxygen at 3 LPM. 9/28/23 CT showing new manifestations of aspiration pneumonitis/pneumonia in the right lower lobe with moderate to large volume airway secretions. Started Zosyn on 9/28/23 PM.   - Antibiotic regimen on admission:               -doxycycline (9/23-9/28)              -ceftriaxone (9/23-9/24)   - Antibiotic regimen following aspiration pneumonia on CT:   -IV Zosyn (9/28-10/2)  -Augmentin (10/2-10/5)  -Prednisone taper as follows:   - 5-day course of 20 mg daily (started 9/30/23) followed by 5-day course of 10 mg daily (started 10/5/23)  - Continue robitussin PRN TID for cough  - Continue PTA inhalers, mucomyst, tessalon perles  - Duonebs and Mucomyst PRN   - S/p solumedrol in ER     CHF with reduced EF 20%-25%  Cardiomyopathy  CHF exacerbation   Recent hospitalization for respiratory failure 2/2 CHF. BNP on admission ~6,000 (noted to be 9,600 on recent admission 9/15/23). Troponin elevated on admission to 43 but was 44 two days prior so no significant change. CHF likely contributing to respiratory failure. Switched from Lasix to Bumex on 9/24/23 per Cardiology.  - Cardiology consult:              -Continue bumetanide 1 mg twice daily  -Continue on rivaroxaban for anticoagulation and statin therapy  -Consider outpatient appointment with EP to discuss CRT device.       CKD stage 4  Cr 1.73 on admission, GFR 30. Baseline Cr approx 1.4-1.6. This has remained stable.     Lower extremity wounds  Right foot swelling  9/23/23 right foot XR showing that the right foot is negative for fracture, there is significant soft tissue swelling along the dorsal aspect of the foot, cellulitis could also have this appearance. Area of swelling is bruised and raised, appears to be hematoma but will continue  to monitor daily for any changes. Tenderness to palpation but no surrounding erythema or warmth, and no fevers.   - WOC consulted  - Continues to improve on exam and per patient     Insomnia  - PRN melatonin 10 mg at bedtime  - PRN hydroxyzine 25-50 mg daily              -Patient has history of QTC prolongation so will monitor closely with cardiac tele as above which was also started due to possible CHF exacerbation     Loose stools   Per RN, patient reported loose stools and requests imodium. Medication has increased risk of PT prolongation  - PRN Imodium      Chronic conditions  Paroxysmal A. Fib: continue PTA DOAC (Xarelto) and PTA metoprolol   Lung cancer: follows with Dr. Suarez at [facility]  Breast cancer: follows with Dr. Suarez at [facility],      Consultations This Hospital Stay   CARE MANAGEMENT / SOCIAL WORK IP CONSULT  RESPIRATORY CARE IP CONSULT  WOUND OSTOMY CONTINENCE NURSE  IP CONSULT  CARE MANAGEMENT / SOCIAL WORK IP CONSULT  CARDIOLOGY IP CONSULT  PALLIATIVE CARE ADULT IP CONSULT  SPIRITUAL HEALTH SERVICES IP CONSULT  SPIRITUAL HEALTH SERVICES IP CONSULT  PHYSICAL THERAPY ADULT IP CONSULT  OCCUPATIONAL THERAPY ADULT IP CONSULT  PAIN MANAGEMENT ADULT IP CONSULT  SURGERY GENERAL IP CONSULT  HEMATOLOGY & ONCOLOGY IP CONSULT  INTERVENTIONAL RADIOLOGY ADULT/PEDS IP CONSULT  PHARMACY IP CONSULT  INTERVENTIONAL RADIOLOGY ADULT/PEDS IP CONSULT  PHYSICAL THERAPY ADULT IP CONSULT  OCCUPATIONAL THERAPY ADULT IP CONSULT    Code Status   No CPR- Do NOT Intubate       The patient was discussed with Dr. Rigoberto LAMAS MD  [facility]  [address] [address]  Phone: [phone]  Fax: [phone]  ______________________________________________________________________    Physical Exam   Vital Signs: Temp: 98.2  F (36.8  C) Temp src: Axillary BP: 129/65 Pulse: 75   Resp: 18 SpO2: 91 % O2 Device: Nasal cannula Oxygen Delivery: 4  LPM  Weight: 114 lbs 12.8 oz    Constitutional: awake, alert, comfortable-appearing, sitting up in chair, no acute distress  HEENT: Lids and lashes normal, sclera clear, conjunctiva normal. Normocephalic and atraumatic. No rhinorrhea. Moist mucus membranes.   Respiratory: Patient breathing comfortably on 3-4L O2 via oxymask, no increased work of breathing or respiratory distress. No cough.  Cardiovascular: Skin is warm and well-perfused.  Skin: Bruising throughout extremities. Several chronic-appearing scabs/lesions and dry skin throughout bilateral lower extremities that appear to be well-healing. Raised, bruised area on dorsal aspect of patient's right foot that is tender to palpation but has no surrounding erythema or warmth with significantly improved swelling.  Musculoskeletal: Trace pitting edema of bilateral lower extremities.  Neurologic: Awake, alert, oriented to name, place and time. No apparent focal deficits.      Primary Care Physician   Pankaj Montanez    Discharge Orders      Primary Care - Care Coordination Referral      General info for SNF    Length of Stay Estimate: Short Term Care: Estimated # of Days <30  Condition at Discharge: Stable  Level of care: skilled   Rehabilitation Potential: Good  Admission H&P remains valid and up-to-date: Yes  Recent Chemotherapy: N/A  Use Nursing Home Standing Orders: Yes     Mantoux instructions    Give two-step Mantoux (PPD) Per Facility Policy Yes     Reason for your hospital stay    Acute on chronic hypoxic respiratory failure, likely multifactorial with COPD and CHF exacerbation     Follow Up and recommended labs and tests    [facility].  The following labs/tests are recommended: BMP, CBC.    Follow up with primary outpatient cardiologist.     Activity - Up with nursing assistance     General info for SNF    Length of Stay Estimate: Short Term Care: Estimated # of Days <30  Condition at Discharge: Stable  Level of care: Skilled    Rehabilitation Potential: Good  Admission H&P remains valid and up-to-date: Yes  Recent Chemotherapy: undergoing treatment with sotorasib, patient's daughter Leelee to manage this at TCU  Use Nursing Home Standing Orders: Yes     Mantoux instructions    Give two-step Mantoux (PPD) Per Facility Policy Yes     Follow Up and recommended labs and tests    Follow up with [facility] physician.  The following labs/tests are recommended: BMP.    Follow up with primary oncologist as planned.     Reason for your hospital stay    Acute on chronic hypoxic respiratory failure, right chest pain secondary to adrenal hemorrhage     Activity - Up with nursing assistance     No CPR- Do NOT Intubate     Physical Therapy Adult Consult    Evaluate and treat as clinically indicated.    Reason:  deconditioning     Occupational Therapy Adult Consult    Evaluate and treat as clinically indicated.    Reason:  deconditioning     Fall precautions     Fall precautions     Diet    Follow this diet upon discharge: Orders Placed This Encounter      Snacks/Supplements Adult: Ensure Enlive; Between Meals      Combination Diet Regular Diet Adult; 2 gm NA Diet; Low Saturated Fat Diet     Diet    Follow this diet upon discharge: Orders Placed This Encounter      Snacks/Supplements Adult: Ensure Enlive; Between Meals      Diet      Regular Diet Adult       Significant Results and Procedures   Most Recent 3 CBC's:  Recent Labs   Lab Test 10/05/23  0503 10/03/23  0533 10/02/23  0710   WBC 15.7* 13.0* 13.2*   HGB 10.1* 9.2* 10.2*   MCV 91 93 93    185 218     Most Recent 3 BMP's:  Recent Labs   Lab Test 10/05/23  0503 10/04/23  1652 10/04/23  0949 10/03/23  0533     --  147* 144   POTASSIUM 3.6 3.8 3.1* 3.3*   CHLORIDE 107  --  109* 110*   CO2 27  --  28 27   BUN 24.3*  --  25.5* 30.3*   CR 1.21*  --  1.31* 1.43*   ANIONGAP 9  --  10 7   MESSI 8.3*  --  8.3* 8.1*   GLC 90  --  88 75     Most Recent 2 LFT's:  Recent Labs   Lab Test  10/02/23  0710 09/29/23  0608   AST 24 35   ALT <5 8   ALKPHOS 36 50   BILITOTAL 0.3 0.2     Most Recent 3 INR's:  Recent Labs   Lab Test 08/27/23  0101 06/17/23  1217 02/20/21  2040   INR 1.05 1.28* 1.16*   ,   Results for orders placed or performed during the hospital encounter of 09/23/23   XR Chest Port 1 View    Narrative    EXAM: XR CHEST PORT 1 VIEW  LOCATION: [facility]  DATE: 9/23/2023    INDICATION: shortness of breath, COPD vs CHF exacerbation  COMPARISON: 09/15/2023      Impression    IMPRESSION: Heart size is stable. Right midlung nodule measuring 2.5 cm, unchanged. Decrease in linear parenchymal opacity in the left midlung. Chronic interstitial scarring/fibrosis bilaterally. There is some increasing pleural thickening in the left   upper lobe. No pneumothorax. Old right rib fracture.   XR Foot Port Right 2 Views    Narrative    EXAM: XR FOOT PORT RIGHT 2 VIEWS  LOCATION: [facility]  DATE: 9/23/2023    INDICATION: pain, bruisng, swelling over 5th metatarsal  COMPARISON: None.      Impression    IMPRESSION: The right foot is negative for fracture. There is significant soft tissue swelling along the dorsal aspect of the foot. Cellulitis could also have this appearance.   XR Chest Port 1 View    Narrative    EXAM: XR CHEST PORT 1 VIEW  LOCATION: [facility]  DATE: 9/28/2023    INDICATION: right side chest pain  COMPARISON: 09/23/2023 and multiple prior studies, PET CT 08/11/2023 and older exams.      Impression    IMPRESSION: No interval change given shallower inspiration. The extensive emphysema is better appreciated on the CT. Known, inferior right upper lobe pulmonary nodule again noted and overlies the right posterior seventh rib. Bandlike fibroatelectasis in   the left midlung and mild bibasilar fibrosis unchanged. No signs of acute pneumonia or failure. Heart and pulmonary vascularity are normal.   CT Chest Pulmonary  Embolism w Contrast    Narrative    EXAM: CT CHEST PULMONARY EMBOLISM W CONTRAST  LOCATION: [facility]  DATE: 9/28/2023    INDICATION: Chest pain, dyspnea  COMPARISON: PET/CT from 08/11/2023  TECHNIQUE: CT chest pulmonary angiogram during arterial phase injection of IV contrast. Multiplanar reformats and MIP reconstructions were performed. Dose reduction techniques were used.   CONTRAST: 75ml Isovue 370    FINDINGS:  ANGIOGRAM CHEST: There is mixing artifact in the right lower lobe pulmonary artery on series 7 image 145. Pulmonary arteries are normal caliber and negative for pulmonary emboli. Thoracic aorta is negative for dissection.     LUNGS AND PLEURA: Slight interval decrease in the size of the peripheral right upper lobe pulmonary nodule measuring 1.9 x 1.9 cm compared to 2.3 x 2.1 cm previously (series 8 image 116). However, there is increased size of the 2 satellite nodules. For   example on series 8 image 131 measuring 0.8 x 0.6 mm compared to 0.6 x 0.4 cm. There is a satellite nodule just superior to this which is new or more conspicuous compared to prior exam measuring 1.0 x 0.9 cm (series 8 image 123). The satellite nodule   superior to the dominant right lobe nodule on series 8 image 91 is similar measuring 1.2 x 0.6 cm.There is a new satellite nodule in the right upper lobe on series 8 image 92 measuring 0.7 x 0.5 cm.    Advanced emphysema. Scattered areas of atelectasis and scarring. Similar pleural thickening and architectural distortion in the anterior left upper lobe/left lingula on series 8 image 118. Moderate to large volume airway secretions particularly in the   right lung and right lower lobe with bronchial wall thickening, mucous plugging, with postobstructive consolidation and groundglass opacities. Mild bronchial wall thickening.    MEDIASTINUM/AXILLAE: Mild cardiomegaly. Mild reflux of contrast in the IVC and proximal hepatic veins. Persistent enlarged external  adenopathy. A few of these are mildly increased in size compared to prior exam. The largest is in the lower paratracheal   chain measuring 2.1 x 1.5 cm on series 7 image 86 (previously 1.7 x 1.3 cm).    CORONARY ARTERY CALCIFICATION: Mild.    UPPER ABDOMEN: Ectasia of the infrarenal abdominal aorta measuring up to 2.8 cm. Extensive atherosclerosis.    MUSCULOSKELETAL: Degenerative changes of the spine. Chronic severe T12 compression deformity with associated focal kyphosis. No suspicious osseous lesions.      Impression    IMPRESSION:  1.  No pulmonary embolus.  2.  New manifestations of aspiration pneumonitis/pneumonia in the right lower lobe with moderate to large volume airway secretions.  3.  Slightly decreased size of the dominant nodule within the right upper lobe.  4.  However, there are a few new or increasing satellite nodules in the right lung. Additionally, interval increase in the size of several of the mediastinal lymph nodes. Continued attention on follow-up.  5.  Advanced emphysema.   US Abdomen Limited*    Narrative    EXAM: US ABDOMEN LIMITED  LOCATION: [facility]  DATE: 9/28/2023    INDICATION: Acute onset right upper quadrant pain radiating to the back.  COMPARISON: PET/CT 08/11/2023.  TECHNIQUE: Limited abdominal ultrasound.    FINDINGS:    GALLBLADDER: No gallstones, wall thickening, or pericholecystic fluid. Negative sonographic Álvarez's sign.    BILE DUCTS: No biliary dilatation. The common duct measures 2 mm.    LIVER: Normal parenchyma with smooth contour. No focal mass.    RIGHT KIDNEY: No hydronephrosis.    PANCREAS: The visualized portions are normal.    No ascites. Incompletely seen right upper quadrant mass, measuring at least 5.5 cm.      Impression    IMPRESSION:    1.  Right upper quadrant mass (possibly adrenal in origin), suspicious for metastatic disease. Recommend contrast enhanced CT abdomen - pelvis.     2.  No cholelithiasis. Gallbladder wall  thickness is exaggerated by partial distention of the gallbladder. Accounting for this, no definitive evidence for acute cholecystitis.    3.  Remaining exam unremarkable.     CT Abdomen Pelvis w Contrast    Narrative    EXAM: CT ABDOMEN PELVIS W CONTRAST  LOCATION: [facility]  DATE: 9/29/2023    INDICATION: acute onset right upper quadrant abdominal pain  COMPARISON: Ultrasound 9/28/2023  TECHNIQUE: CT scan of the abdomen and pelvis was performed following injection of IV contrast. Multiplanar reformats were obtained. Dose reduction techniques were used.  CONTRAST: 53ml Isovue 370    FINDINGS:   LOWER CHEST: Tiny right pleural effusion with associated right lower lobe airspace opacities and 0.3 cm middle lobe nodule, unchanged compared to 9/28/2023    HEPATOBILIARY: Normal.    PANCREAS: Normal.    SPLEEN: Normal.    ADRENAL GLANDS: 5.7 x 3.5 cm right adrenal nodule with surrounding high attenuation that extends inferiorly along the right kidney. 1.8 x 1.4 cm left adrenal nodule (3/53). Both are new compared to PET/CT of 8/11/2023.    KIDNEYS/BLADDER: No significant mass, stones, or hydronephrosis. There are simple or benign cysts. No follow up is needed. Circumferential bladder wall thickening.    BOWEL: Normal.    LYMPH NODES: No lymphadenopathy.    VASCULATURE: Moderate multifocal atherosclerotic calcification of the abdominal aorta and iliofemoral arteries.    PELVIC ORGANS: Normal.    MUSCULOSKELETAL: Osteopenia and degenerative changes in the spine with dextroconvex curvature of the lumbar spine. Moderate T12 compression deformity is unchanged. Left hip surgical hardware. No aggressive or destructive osseous lesions. Partially imaged   subcutaneous nodule in the right lower back posteriorly.      Impression    IMPRESSION:   1.  Bilateral adrenal nodules are new compared to 8/11/2023 and concerning for metastases. There is high attenuation fluid associated with the right adrenal  nodule, which is likely hemorrhage and likely accounts for acute right upper quadrant pain.   Hemorrhage extends inferiorly in the retroperitoneum along the right kidney.    2.  Tiny right pleural effusion with associated airspace opacities.     *Note: Due to a large number of results and/or encounters for the requested time period, some results have not been displayed. A complete set of results can be found in Results Review.       Discharge Medications   Current Discharge Medication List        START taking these medications    Details   amoxicillin-clavulanate (AUGMENTIN) 500-125 MG tablet Take 1 tablet by mouth every 12 hours  Qty: 1 tablet, Refills: 0    Associated Diagnoses: Acute pneumonia      bumetanide (BUMEX) 1 MG tablet Take 1 tablet (1 mg) by mouth 2 times daily  Qty: 60 tablet, Refills: 0    Associated Diagnoses: Heart failure, systolic, acute on chronic (H)      gabapentin (NEURONTIN) 300 MG capsule Take 1 capsule (300 mg) by mouth at bedtime  Qty: 30 capsule, Refills: 0    Associated Diagnoses: Wedge compression fracture of t11-T12 vertebra, subsequent encounter for fracture with routine healing      predniSONE (DELTASONE) 10 MG tablet Take 1 tablet (10 mg) by mouth daily  Qty: 5 tablet, Refills: 0    Associated Diagnoses: Acute respiratory failure with hypoxia (H)           CONTINUE these medications which have CHANGED    Details   metoprolol succinate ER (TOPROL XL) 50 MG 24 hr tablet Take 1 tablet (50 mg) by mouth At Bedtime  Qty: 30 tablet, Refills: 0    Associated Diagnoses: Heart failure, systolic, acute on chronic (H)           CONTINUE these medications which have NOT CHANGED    Details   acetaminophen (TYLENOL) 500 MG tablet Take 1,000 mg by mouth every 8 hours as needed for mild pain or fever       acetylcysteine (MUCOMYST) 10 % nebulizer solution Inhale 4 mLs into the lungs every 4 hours as needed for mucolysis/respiratory distress      albuterol (PROAIR HFA/PROVENTIL HFA/VENTOLIN HFA) 108  (90 Base) MCG/ACT inhaler Inhale 2 puffs into the lungs every 6 hours as needed for shortness of breath, wheezing or cough      Azelastine HCl 137 MCG/SPRAY SOLN Spray 1 spray into both nostrils 2 times daily as needed for rhinitis      benzonatate (TESSALON PERLES) 100 MG capsule Take 1 capsule (100 mg) by mouth 3 times daily as needed for cough  Qty: 90 capsule, Refills: 3    Associated Diagnoses: Pulmonary emphysema, unspecified emphysema type (H)      CALCIUM-MAGNESIUM-ZINC PO Take 1 tablet by mouth daily      cetirizine (ZYRTEC) 5 MG tablet Take 1 tablet (5 mg) by mouth daily as needed for allergies    Associated Diagnoses: Seasonal allergic rhinitis, unspecified trigger      chlorhexidine (PERIDEX) 0.12 % solution [CHLORHEXIDINE (PERIDEX) 0.12 % SOLUTION] Apply 15 mL to the mouth or throat at bedtime as needed.       escitalopram (LEXAPRO) 10 MG tablet Take 1 tablet (10 mg) by mouth daily  Qty: 90 tablet, Refills: 3    Comments: Dose increase  Associated Diagnoses: Anxiety      fluticasone (FLONASE) 50 MCG/ACT nasal spray Spray 1 spray into both nostrils daily as needed for rhinitis or allergies      Fluticasone-Umeclidin-Vilanterol (TRELEGY ELLIPTA) 200-62.5-25 MCG/INH oral inhaler Inhale 1 puff into the lungs At Bedtime      furosemide (LASIX) 80 MG tablet Take 1 tablet (80 mg) by mouth 2 times daily  Qty: 28 tablet, Refills: 0    Associated Diagnoses: Nonischemic cardiomyopathy (H)      HYDROmorphone, STANDARD CONC, (DILAUDID) 1 MG/ML oral solution Take 1 mg by mouth 3 times daily as needed for pain      ipratropium (ATROVENT HFA) 17 MCG/ACT inhaler Inhale 2 puffs into the lungs every 6 hours as needed for wheezing      ipratropium - albuterol 0.5 mg/2.5 mg/3 mL (DUONEB) 0.5-2.5 (3) MG/3ML neb solution Take 1 vial by nebulization every 6 hours as needed for shortness of breath, wheezing or cough      Melatonin 5 MG TBDP Take 15 mg by mouth At Bedtime      mirabegron (MYRBETRIQ) 25 MG 24 hr tablet Take 1  tablet (25 mg) by mouth daily  Qty: 30 tablet, Refills: 0    Associated Diagnoses: Urinary frequency      multivitamin w/minerals (THERA-VIT-M) tablet Take 1 tablet by mouth daily      nystatin (MYCOSTATIN) 830661 UNIT/GM external cream Apply topically 3 times daily as needed for dry skin      OLANZapine (ZYPREXA) 5 MG tablet Take 1 tablet (5 mg) by mouth At Bedtime  Qty: 30 tablet, Refills: 3    Associated Diagnoses: Insomnia due to medical condition      prochlorperazine (COMPAZINE) 10 MG tablet Take 1 tablet (10 mg) by mouth every 6 hours as needed (Nausea/Vomiting)  Qty: 30 tablet, Refills: 5    Associated Diagnoses: Malignant neoplasm of upper lobe of right lung (H)      rivaroxaban ANTICOAGULANT (XARELTO) 15 MG TABS tablet Take 1 tablet (15 mg) by mouth At Bedtime      simvastatin (ZOCOR) 40 MG tablet Take 1 tablet (40 mg) by mouth At Bedtime  Qty: 90 tablet, Refills: 3    Associated Diagnoses: Hypercholesterolemia      sodium chloride 0.9 % neb solution Take 3 mLs by nebulization every 3 hours as needed for wheezing      solifenacin (VESICARE) 5 MG tablet Take 1 tablet (5 mg) by mouth daily  Qty: 90 tablet, Refills: 3    Associated Diagnoses: Urinary frequency      spironolactone (ALDACTONE) 25 MG tablet Take 1 tablet (25 mg) by mouth daily  Qty: 30 tablet, Refills: 0    Associated Diagnoses: Heart failure with reduced ejection fraction (H)      tamoxifen (NOLVADEX) 20 MG tablet Take 1 tablet (20 mg) by mouth daily  Qty: 90 tablet, Refills: 3    Associated Diagnoses: Malignant neoplasm of central portion of left breast in female, estrogen receptor positive (H)      traMADol (ULTRAM) 50 MG tablet Take 50 mg by mouth 2 times daily as needed for severe pain      vitamin D3 (CHOLECALCIFEROL) 50 mcg (2000 units) tablet Take 50 mcg by mouth daily      zinc oxide (DESITIN) 40 % external ointment Apply topically as needed for dry skin or irritation (Twice a day to gluteal area skin irritation)  Qty: 113 g, Refills: 1     Associated Diagnoses: Intertrigo      famotidine (PEPCID) 20 MG tablet Take 1 tablet (20 mg) by mouth every other day  Qty: 90 tablet, Refills: 0    Associated Diagnoses: COPD exacerbation (H); Pulmonary emphysema, unspecified emphysema type (H)      sotorasib (LUMAKRAS) 120 MG tablet Take 6 tablets (720 mg) by mouth daily for 30 days Do not chew, crush or split tablets.  Qty: 180 tablet, Refills: 0    Comments: Dose decrease  Associated Diagnoses: Malignant neoplasm of upper lobe of right lung (H)           STOP taking these medications       gabapentin (NEURONTIN) 600 MG tablet Comments:   Reason for Stopping:             Allergies   Allergies   Allergen Reactions    Sulfa (Sulfonamide Antibiotics) [Sulfa Antibiotics] Rash     Headaches and dizziness.    Homeopathic Drugs [External Allergen Needs Reconciliation - See Comment] Unknown     runny nose    Mold Extracts [Molds & Smuts] Unknown    Morphine (Pf) [Morphine] Unknown     hallucinate    Lisinopril Itching, Cough and Unknown     cough    Sulfacetamide Sodium [Sulfacetamide] Rash
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
## 2023-10-06 PROBLEM — R00.0 TACHYCARDIA, UNSPECIFIED: Status: ACTIVE | Noted: 2021-03-30

## 2023-10-06 PROBLEM — R10.30 LOWER ABDOMINAL PAIN, UNSPECIFIED: Status: ACTIVE | Noted: 2021-03-30

## 2023-10-06 PROBLEM — N17.9 ACUTE KIDNEY FAILURE, UNSPECIFIED (H): Status: ACTIVE | Noted: 2021-03-30

## 2023-10-06 PROBLEM — M53.3 SACROCOCCYGEAL DISORDERS, NOT ELSEWHERE CLASSIFIED: Status: ACTIVE | Noted: 2021-03-30

## 2023-10-06 PROBLEM — E55.9 VITAMIN D DEFICIENCY, UNSPECIFIED: Status: ACTIVE | Noted: 2017-09-30

## 2023-10-06 PROBLEM — M79.18 MYALGIA, OTHER SITE: Status: ACTIVE | Noted: 2021-03-30

## 2023-10-06 PROBLEM — D64.9 CHRONIC ANEMIA: Status: ACTIVE | Noted: 2023-01-01

## 2023-10-06 PROBLEM — A41.9 SEPSIS, UNSPECIFIED ORGANISM (H): Status: ACTIVE | Noted: 2021-03-30

## 2023-10-06 PROBLEM — G89.29 OTHER CHRONIC PAIN: Status: ACTIVE | Noted: 2017-09-30

## 2023-10-06 PROBLEM — Z91.81 HISTORY OF FALL: Status: ACTIVE | Noted: 2023-01-01

## 2023-10-06 PROBLEM — H26.9 CATARACT: Status: ACTIVE | Noted: 2017-09-30

## 2023-10-06 PROBLEM — R53.81 OTHER MALAISE: Status: ACTIVE | Noted: 2021-03-30

## 2023-10-06 PROBLEM — L60.2 LONG TOENAIL: Status: ACTIVE | Noted: 2022-10-26

## 2023-10-06 PROBLEM — S22.49XD: Status: ACTIVE | Noted: 2021-03-30

## 2023-10-06 PROBLEM — N39.0 URINARY TRACT INFECTION, SITE NOT SPECIFIED: Status: ACTIVE | Noted: 2017-10-10

## 2023-10-06 PROBLEM — R91.1 SOLITARY PULMONARY NODULE: Status: ACTIVE | Noted: 2021-03-30

## 2023-10-06 PROBLEM — S93.409A SPRAIN OF ANKLE: Status: ACTIVE | Noted: 2023-01-01

## 2023-10-06 PROBLEM — G57.00 LESION OF SCIATIC NERVE, UNSPECIFIED LOWER LIMB: Status: ACTIVE | Noted: 2021-03-30

## 2023-10-06 PROBLEM — A04.72 ENTEROCOLITIS DUE TO CLOSTRIDIUM DIFFICILE, NOT SPECIFIED AS RECURRENT: Status: ACTIVE | Noted: 2022-04-22

## 2023-10-06 PROBLEM — R74.8 ABNORMAL LEVELS OF OTHER SERUM ENZYMES: Status: ACTIVE | Noted: 2021-03-30

## 2023-10-06 PROBLEM — M79.676 TOE PAIN: Status: ACTIVE | Noted: 2022-10-26

## 2023-10-06 NOTE — LETTER
Hand-off  for Care Coordination    Att: Discharge Planner:  Please if able, fax or call the number listed below when the below patient is discharged from your facility.  Clinic Care Coordination from patient's Primary Care Clinic will outreach to patient upon discharge to assist with TCU transition/discharge.    Thank you      Patient Name:   Irene León  Patient :     1945  Patient PCP:     Pankaj Montanez MD    Patient Primary Clinic:   [address] [place] [address]   [facility] [id number]  D/C Facility: _____________________________________________   TCU Contact Info for questions: ___________________________  D/C Date:  ______________________________________________  Follow-up Apt with PCP after TCU D/C:   ____________________  Other Follow-up Apt s:      _________________________________________  Additional information (concerns, and Home Care, ect ):   __________________________________________________________________  __________________________________________________________________  Care Coordinator to Contact at [place]  Fax to: [phone]  Attn:  Anushka Morales RN Clinic Care Coordination [facility]  Phone: [phone]

## 2023-10-06 NOTE — PROGRESS NOTES
Clinic Care Coordination Contact  Care Coordination Transition Communication         Clinical Data: Patient was hospitalized at  from 9/23 to 10/5 with diagnosis of   Left hip pain     Nonischemic cardiomyopathy (H)    COPD (chronic obstructive pulmonary disease) (H)    Benign essential hypertension    Chronic kidney disease, stage IV (severe) (H)    Factor 5 Leiden mutation, heterozygous (H24)    Senile osteoporosis    COPD exacerbation (H)    Community acquired pneumonia of left upper lobe of lung    Acute on chronic diastolic congestive heart failure (H)    Atrial fibrillation with rapid ventricular response (H)    Weight loss    Acute on chronic respiratory failure with hypoxia (H)    Prolonged Q-T interval on ECG    Neutrophilia    Abscess of upper lobe of left lung with pneumonia (H)    Invasive ductal carcinoma of breast, female, left (H)    ACP (advance care planning)    Anisocoria    At high risk for impaired skin integrity    Trochanteric bursitis    Chronic anticoagulation    Chronic systolic congestive heart failure (H)    Closed fracture of hip with delayed healing    Compression fracture of L1 vertebra, sequela    Depression with anxiety    Dyslipidemia    Dyspnea    Exacerbation of intermittent asthma, unspecified asthma severity    Family history of blood disease    Fibrocystic breast    Fissure in skin of foot    Fracture of femur (H)    Gait disorder    GERD (gastroesophageal reflux disease)    Herniated intervertebral disc    Groin pain    Herpes zoster without complication    High frequency sensorineural hearing loss of left ear    Patient's other noncompliance with medication regimen    Hot flashes due to tamoxifen    Hot flashes, menopausal    Hyperlipidemia    Hypoxia    Idiopathic edema    Insomnia    Malignant neoplasm of central portion of left female breast (H)    Metabolic encephalopathy    Unspecified asthma, uncomplicated    Muscle weakness (generalized)    OAB (overactive bladder)     Osteoarthritis of hip    Osteoporosis    Other abnormalities of gait and mobility    Other chronic sinusitis    Pain due to fracture    Personal history of malignant neoplasm of breast    Personal history of transient ischemic attack (TIA), and cerebral infarction without residual deficits    Physical deconditioning    Piriformis syndrome    Post menopausal syndrome    Pulsatile tinnitus of both ears    S/P hip replacement, left    Sacro-iliac pain    Sacral insufficiency fracture    T12 compression fracture (H)    Tendonitis    Urge incontinence    Urinary incontinence    Wedge compression fracture of t11-T12 vertebra, subsequent encounter for fracture with routine healing    Heart failure with reduced ejection fraction (H)    Leukocytosis, unspecified type    Acute pneumonia    Pedal edema    Chronic combined systolic and diastolic congestive heart failure (H)    Pneumonia of left upper lobe due to infectious organism    Malignant neoplasm of upper lobe of right lung (H)    Heart failure, systolic, acute on chronic (H)    Right upper quadrant pain   .     Transition to Facility:              Facility Name: [facility]               Contact name and phone number/fax:  [phone]    Plan: RN/SW Care Coordinator will await notification from facility staff informing RN/SW Care Coordinator of patient's discharge plans/needs. RN/SW Care Coordinator will review chart and outreach to facility staff every 4 weeks and as needed.     See hospital discharge planner note 9/25

## 2023-10-06 NOTE — LETTER
10/6/2023        RE: Irene León  [address]  [facility] [address]                                                                                      [facility]     Code Status:  DNR/DNI  Visit Type: Hospital F/U     Facility:   [facility] ([facility]) [[id number]]        History of Present Illness:   Hospital Admission Date: 9/23/2023     Hospital Discharge Date: 10/5/2023      Irene León is a 78 year old female with past medical history for HFrEF, chronic O2, COPD,hx of Lung cancer on oral chemo, CKD, Factor 5, HLD,  hx of TIA, depression and anxiety. She was recently hospitalized for acute hypoxic respiratory failure 2/2 COPD and CHF.  CXR also showed aspiration pneumonitis and pneumonia. She was treated with IV ceftriaxone and oral doxycycline and prednisone with improved respiratory status.  Cardiology changed her lasix to Bumex with good resoluation of her edema.      FYI; patient's  and primary caregiver passed away in the ICU during this hospitalization.      She had right sided chest pain and workup showed CT with new right adrenal mass with hemorrhage with concern for metastasis.  She was seen by Hemon[facility] and okayed to resume xarelto and patient chose to resume sotorasib. Surgery recommended conservative management and consider dcing xarelto if she has continued bleeding.     Today, she feels her breathing is getting better but not back to baseline.  She typically uses 3L of O2 at rest and 5-6 with exertion.  She is now needing intermittent 5L at rest.  She was discharged from the hospital with orders for both lasix and bumex. She endorses reflux today and is satisfied with TUMS.     Past Medical History:   Diagnosis Date     ANATOLIY (acute kidney injury) (H24)      Allergic rhinitis      Arrhythmia      Atrial fibrillation with RVR (H)      Bacteremia      Breast cancer (H) 2017     Cardiomyopathy (H)      Centrilobular emphysema (H)      CHF (congestive heart  failure) (H)      Chronic kidney disease      CKD (chronic kidney disease)      COPD (chronic obstructive pulmonary disease) (H)      Coronary artery disease      Depression      Dysphagia      E. coli sepsis (H)      Factor 5 Leiden mutation, heterozygous (H24)      GERD (gastroesophageal reflux disease)      Heart failure with reduced ejection fraction (H) 04/17/2023     Hx of radiation therapy 2017     Hyperlipidemia      Hypertension      Hypokalemia      Hypomagnesemia      Idiopathic cardiomyopathy (H)      Left hip pain 04/30/2014     OAB (overactive bladder)      Osteoporosis      Sacral insufficiency fracture      Sinusitis      Syncope      Urge incontinence      Vocal cord dysfunction      Past Surgical History:   Procedure Laterality Date     ARTHROPLASTY REVISION HIP Left      BIOPSY BREAST Right 2017     BLADDER SURGERY      bladder interstim with removal     CARDIAC CATHETERIZATION       CATARACT EXTRACTION Left 07/18/2017     IR ABDOMINAL AORTOGRAM  4/16/2003     IR AORTIC ARCH 4 VESSEL ANGIOGRAM  4/16/2003     IR MISCELLANEOUS PROCEDURE  4/16/2003     LUMPECTOMY BREAST Left 06/2017    x2     PICC DOUBLE LUMEN PLACEMENT  4/11/2022          PICC TRIPLE LUMEN PLACEMENT  4/7/2022          ZZC OPEN TX FEMORAL FRACTURE DISTAL MED/LAT CONDYLE Left 10/28/2015    Procedure: OPEN REDUCTION INTERNAL FIXATION LEFT  PROXIMAL FEMUR PERIPROSTHETIC FRACTURE;  Surgeon: Yovanny Albarran MD;  Location: [facility] OR;  Service: Orthopedics     Family History   Problem Relation Age of Onset     Osteoporosis Other      Prostate Cancer Brother      Breast Cancer Maternal Aunt         age thought to be in her 70's-80's     Prostate Cancer Maternal Uncle      Social History     Socioeconomic History     Marital status:      Spouse name: Not on file     Number of children: Not on file     Years of education: Not on file     Highest education level: Not on file   Occupational History     Not on file   Tobacco Use      Smoking status: Former     Types: Cigarettes     Quit date: 10/28/2007     Years since quitting: 15.9     Smokeless tobacco: Former     Quit date: 9/6/2004   Vaping Use     Vaping Use: Former   Substance and Sexual Activity     Alcohol use: No     Drug use: No     Sexual activity: Not on file   Other Topics Concern     Not on file   Social History Narrative     and lives in home with 3 flight of steps     Social Determinants of Health     Financial Resource Strain: Not on file   Food Insecurity: Not on file   Transportation Needs: Not on file   Physical Activity: Not on file   Stress: Not on file   Social Connections: Not on file   Interpersonal Safety: Not on file   Housing Stability: Not on file       Current Outpatient Medications   Medication Sig Dispense Refill     acetaminophen (TYLENOL) 500 MG tablet Take 1,000 mg by mouth every 8 hours as needed for mild pain or fever        acetylcysteine (MUCOMYST) 10 % nebulizer solution Inhale 4 mLs into the lungs every 4 hours as needed for mucolysis/respiratory distress       albuterol (PROAIR HFA/PROVENTIL HFA/VENTOLIN HFA) 108 (90 Base) MCG/ACT inhaler Inhale 2 puffs into the lungs every 6 hours as needed for shortness of breath, wheezing or cough       amoxicillin-clavulanate (AUGMENTIN) 500-125 MG tablet Take 1 tablet by mouth every 12 hours 1 tablet 0     Azelastine HCl 137 MCG/SPRAY SOLN Spray 1 spray into both nostrils 2 times daily as needed for rhinitis       benzonatate (TESSALON PERLES) 100 MG capsule Take 1 capsule (100 mg) by mouth 3 times daily as needed for cough 90 capsule 3     bumetanide (BUMEX) 1 MG tablet Take 1 tablet (1 mg) by mouth 2 times daily 60 tablet 0     CALCIUM-MAGNESIUM-ZINC PO Take 1 tablet by mouth daily       cetirizine (ZYRTEC) 5 MG tablet Take 1 tablet (5 mg) by mouth daily as needed for allergies       chlorhexidine (PERIDEX) 0.12 % solution [CHLORHEXIDINE (PERIDEX) 0.12 % SOLUTION] Apply 15 mL to the mouth or throat at  bedtime as needed.        escitalopram (LEXAPRO) 10 MG tablet Take 1 tablet (10 mg) by mouth daily 90 tablet 3     famotidine (PEPCID) 20 MG tablet Take 1 tablet (20 mg) by mouth every other day 90 tablet 0     fluticasone (FLONASE) 50 MCG/ACT nasal spray Spray 1 spray into both nostrils daily as needed for rhinitis or allergies       Fluticasone-Umeclidin-Vilanterol (TRELEGY ELLIPTA) 200-62.5-25 MCG/INH oral inhaler Inhale 1 puff into the lungs At Bedtime       gabapentin (NEURONTIN) 300 MG capsule Take 1 capsule (300 mg) by mouth at bedtime 30 capsule 0     ipratropium (ATROVENT HFA) 17 MCG/ACT inhaler Inhale 2 puffs into the lungs every 6 hours as needed for wheezing       ipratropium - albuterol 0.5 mg/2.5 mg/3 mL (DUONEB) 0.5-2.5 (3) MG/3ML neb solution Take 1 vial by nebulization every 6 hours as needed for shortness of breath, wheezing or cough       Melatonin 5 MG TBDP Take 15 mg by mouth At Bedtime       metoprolol succinate ER (TOPROL XL) 50 MG 24 hr tablet Take 1 tablet (50 mg) by mouth At Bedtime 30 tablet 0     mirabegron (MYRBETRIQ) 25 MG 24 hr tablet Take 1 tablet (25 mg) by mouth daily 30 tablet 0     multivitamin w/minerals (THERA-VIT-M) tablet Take 1 tablet by mouth daily       nystatin (MYCOSTATIN) 705084 UNIT/GM external cream Apply topically 3 times daily as needed for dry skin       OLANZapine (ZYPREXA) 5 MG tablet Take 1 tablet (5 mg) by mouth At Bedtime 30 tablet 3     predniSONE (DELTASONE) 10 MG tablet Take 1 tablet (10 mg) by mouth daily 5 tablet 0     prochlorperazine (COMPAZINE) 10 MG tablet Take 1 tablet (10 mg) by mouth every 6 hours as needed (Nausea/Vomiting) 30 tablet 5     rivaroxaban ANTICOAGULANT (XARELTO) 15 MG TABS tablet Take 1 tablet (15 mg) by mouth At Bedtime       simvastatin (ZOCOR) 40 MG tablet Take 1 tablet (40 mg) by mouth At Bedtime 90 tablet 3     sodium chloride 0.9 % neb solution Take 3 mLs by nebulization every 3 hours as needed for wheezing       solifenacin  (VESICARE) 5 MG tablet Take 1 tablet (5 mg) by mouth daily 90 tablet 3     sotorasib (LUMAKRAS) 120 MG tablet Take 6 tablets (720 mg) by mouth daily for 30 days Do not chew, crush or split tablets. 180 tablet 0     spironolactone (ALDACTONE) 25 MG tablet Take 1 tablet (25 mg) by mouth daily 30 tablet 0     tamoxifen (NOLVADEX) 20 MG tablet Take 1 tablet (20 mg) by mouth daily 90 tablet 3     traMADol (ULTRAM) 50 MG tablet Take 1 tablet (50 mg) by mouth 2 times daily as needed for severe pain 60 tablet 0     vitamin D3 (CHOLECALCIFEROL) 50 mcg (2000 units) tablet Take 50 mcg by mouth daily       zinc oxide (DESITIN) 40 % external ointment Apply topically as needed for dry skin or irritation (Twice a day to gluteal area skin irritation) 113 g 1     Allergies   Allergen Reactions     Sulfa (Sulfonamide Antibiotics) [Sulfa Antibiotics] Rash     Headaches and dizziness.     Homeopathic Drugs [External Allergen Needs Reconciliation - See Comment] Unknown     runny nose     Mold Extracts [Molds & Smuts] Unknown     Morphine (Pf) [Morphine] Unknown     hallucinate     Lisinopril Itching, Cough and Unknown     cough     Sulfacetamide Sodium [Sulfacetamide] Rash     Immunization History   Administered Date(s) Administered     COVID-19 Bivalent 12+ (Pfizer) 03/02/2021, 12/08/2021, 11/16/2022     COVID-19 MONOVALENT 12+ (Pfizer) 03/02/2021, 03/23/2021, 12/08/2021     COVID-19 Monovalent 18+ (Moderna) 03/02/2021     FLU 6-35 months 11/03/2003     Flu 65+ Years 09/20/2017, 09/25/2018, 10/10/2019     Flu, Unspecified 10/01/2016, 10/01/2018     V2k7-21 Novel Flu 01/05/2010     Influenza (H1N1) 01/05/2010     Influenza (High Dose) 3 valent vaccine 10/30/2014, 10/01/2015, 11/26/2016, 09/25/2018, 10/10/2019, 10/19/2020, 11/16/2022     Influenza (IIV3) PF 11/03/2003, 11/08/2006, 10/24/2007, 11/28/2008, 10/09/2009, 10/22/2010, 01/26/2012, 10/03/2012, 11/01/2013     Influenza Vaccine 65+ (Fluzone HD) 09/20/2017, 10/19/2020, 12/15/2020,  "11/16/2022     Mantoux Tuberculin Skin Test 12/13/2007, 10/24/2020, 11/03/2020     Pneumo Conj 13-V (2010&after) 05/11/2015     Pneumococcal 23 valent 11/24/1997, 11/21/2007, 05/17/2017     TD,PF 7+ (Tenivac) 07/20/2004     TDAP (Adacel,Boostrix) 02/12/2016     TDAP Vaccine (Boostrix) 02/12/2016     Zoster recombinant adjuvanted (SHINGRIX) 07/31/2020     Zoster vaccine, live 02/18/2009, 10/20/2014, 07/31/2020         Post Discharge Medication Reconciliation Status: discharge medications reconciled and changed, per note/orders.    Medications list and allergies in the facility chart have been reviewed.  Please see facility EMR for most up to date list.         Review of Systems   Patient denies fever, chills, headache, lightheadedness, dizziness, rhinorrhea, cough, congestion,  palpitations, abdominal pain, n/v, diarrhea, constipation, change in appetite, change in sleep pattern, dysuria, frequency, burning or pain with urination.  Other than stated in HPI all other review of systems is negative.       Physical Exam  Vital signs:/69   Pulse 83   Temp 98.1  F (36.7  C)   Resp 18   Ht 1.575 m (5' 2\")   Wt 51.7 kg (114 lb)   SpO2 93%   BMI 20.85 kg/m     GENERAL APPEARANCE: thin, frail elderly female,  in no acute distress.  HEENT: normocephalic, atraumatic  sclerae anicteric, conjunctivae clear and moist, EOM intact  LUNGS: diminished LLL, no adventitious sounds, respiratory effort normal.  CARD: RRR, S1, S2, without murmurs, gallops, rubs  ABD: Soft, nondistended and nontender with normal bowel sounds.   MSK: Muscle strength and tone were equal bilaterally. Moves all extremities easily and intentionally.   EXTREMITIES: No cyanosis, clubbing or edema.  NEURO: Alert and oriented x 3. Face is symmetric.  SKIN: hemosiderin staining of shins and purpura and scabbing noted.   PSYCH: euthymic        Labs:    Last Comprehensive Metabolic Panel:  Lab Results   Component Value Date     10/05/2023    POTASSIUM " 3.6 10/05/2023    CHLORIDE 107 10/05/2023    CO2 27 10/05/2023    ANIONGAP 9 10/05/2023    GLC 90 10/05/2023    BUN 24.3 (H) 10/05/2023    CR 1.21 (H) 10/05/2023    GFRESTIMATED 46 (L) 10/05/2023    MESSI 8.3 (L) 10/05/2023       Lab Results   Component Value Date    WBC 15.7 10/05/2023     Lab Results   Component Value Date    RBC 3.54 10/05/2023     Lab Results   Component Value Date    HGB 10.1 10/05/2023     Lab Results   Component Value Date    HCT 32.2 10/05/2023     Lab Results   Component Value Date    MCV 91 10/05/2023     Lab Results   Component Value Date    MCH 28.5 10/05/2023     Lab Results   Component Value Date    MCHC 31.4 10/05/2023     Lab Results   Component Value Date    RDW 15.4 10/05/2023     Lab Results   Component Value Date     10/05/2023           Assessment/plan:   Pulmonary emphysema, unspecified emphysema type (H)  Acute and chronic respiratory failure with hypoxia (H)  COPD exacerbation (H)  Orders written for O2 adjustments in the home.  Okay for patient to keep her albuterol inhaler at bedside and self admin.  Augmentin completed today.  Continue with prednisone through 10/11.  Question if she will need alternative placement at discharge due to no caregiver.      Acute on chronic diastolic congestive heart failure (H)  Stable, check weights MWF.  Continue with Bumex and spironolactone.  Lasix was dc'd.  Continue with Radha    Lung Cancer  Adrenal Mass  Follow up with oncology.  Family to supply home sotorasib.      Pneumonia due to infectious organism, unspecified laterality, unspecified part of lung  Symptoms improving, counseled on s/s of aspiration.  Denies any swallowing issues.  Augmentin completed today.      Factor 5 Leiden mutation, heterozygous (H24)  Noted, continue with home Xarelto.     Gastroesophageal reflux disease with esophagitis without hemorrhage  Continue with TUMS and famotidine.     45 total minutes spent in review of medical records from [facility],  assessing patient and counseling and coordinating on the above plan of care.     Electronically signed by: Rayna Carbajal NP      Sincerely,        Rayna Carbajal, NP

## 2023-10-06 NOTE — TELEPHONE ENCOUNTER
Staff from [facility] call to clarify follow up appointment with Dr. Suarez. Patient is scheduled on 10/12. Family thought patient was to follow up in 1 month from discharge. Patient was discharged from the hospital on 10/5/23.    Per Dr. Suarez she would like to have patient follow up as scheduled on 10/12/23.    Return call placed to care facility, message left for staff to return call to triage line.    Bianca Coates RN

## 2023-10-06 NOTE — PROGRESS NOTES
[facility]     Code Status:  DNR/DNI  Visit Type: Hospital F/U     Facility:   [facility] ([facility]) [[id number]]        History of Present Illness:   Hospital Admission Date: 9/23/2023     Hospital Discharge Date: 10/5/2023      Irene León is a 78 year old female with past medical history for HFrEF, chronic O2, COPD,hx of Lung cancer on oral chemo, CKD, Factor 5, HLD,  hx of TIA, depression and anxiety. She was recently hospitalized for acute hypoxic respiratory failure 2/2 COPD and CHF.  CXR also showed aspiration pneumonitis and pneumonia. She was treated with IV ceftriaxone and oral doxycycline and prednisone with improved respiratory status.  Cardiology changed her lasix to Bumex with good resoluation of her edema.      FYI; patient's  and primary caregiver passed away in the ICU during this hospitalization.      She had right sided chest pain and workup showed CT with new right adrenal mass with hemorrhage with concern for metastasis.  She was seen by Hemonc and okayed to resume xarelto and patient chose to resume sotorasib. Surgery recommended conservative management and consider dcing xarelto if she has continued bleeding.     Today, she feels her breathing is getting better but not back to baseline.  She typically uses 3L of O2 at rest and 5-6 with exertion.  She is now needing intermittent 5L at rest.  She was discharged from the hospital with orders for both lasix and bumex. She endorses reflux today and is satisfied with TUMS.     Past Medical History:   Diagnosis Date    ANATOLIY (acute kidney injury) (H24)     Allergic rhinitis     Arrhythmia     Atrial fibrillation with RVR (H)     Bacteremia     Breast cancer (H) 2017    Cardiomyopathy (H)     Centrilobular emphysema (H)     CHF (congestive heart failure) (H)     Chronic kidney disease     CKD (chronic kidney disease)     COPD (chronic  obstructive pulmonary disease) (H)     Coronary artery disease     Depression     Dysphagia     E. coli sepsis (H)     Factor 5 Leiden mutation, heterozygous (H24)     GERD (gastroesophageal reflux disease)     Heart failure with reduced ejection fraction (H) 04/17/2023    Hx of radiation therapy 2017    Hyperlipidemia     Hypertension     Hypokalemia     Hypomagnesemia     Idiopathic cardiomyopathy (H)     Left hip pain 04/30/2014    OAB (overactive bladder)     Osteoporosis     Sacral insufficiency fracture     Sinusitis     Syncope     Urge incontinence     Vocal cord dysfunction      Past Surgical History:   Procedure Laterality Date    ARTHROPLASTY REVISION HIP Left     BIOPSY BREAST Right 2017    BLADDER SURGERY      bladder interstim with removal    CARDIAC CATHETERIZATION      CATARACT EXTRACTION Left 07/18/2017    IR ABDOMINAL AORTOGRAM  4/16/2003    IR AORTIC ARCH 4 VESSEL ANGIOGRAM  4/16/2003    IR MISCELLANEOUS PROCEDURE  4/16/2003    LUMPECTOMY BREAST Left 06/2017    x2    PICC DOUBLE LUMEN PLACEMENT  4/11/2022         PICC TRIPLE LUMEN PLACEMENT  4/7/2022         ZZC OPEN TX FEMORAL FRACTURE DISTAL MED/LAT CONDYLE Left 10/28/2015    Procedure: OPEN REDUCTION INTERNAL FIXATION LEFT  PROXIMAL FEMUR PERIPROSTHETIC FRACTURE;  Surgeon: Yovanny Albarran MD;  Location: [facility] OR;  Service: Orthopedics     Family History   Problem Relation Age of Onset    Osteoporosis Other     Prostate Cancer Brother     Breast Cancer Maternal Aunt         age thought to be in her 70's-80's    Prostate Cancer Maternal Uncle      Social History     Socioeconomic History    Marital status:      Spouse name: Not on file    Number of children: Not on file    Years of education: Not on file    Highest education level: Not on file   Occupational History    Not on file   Tobacco Use    Smoking status: Former     Types: Cigarettes     Quit date: 10/28/2007     Years since quitting: 15.9    Smokeless tobacco: Former      Quit date: 9/6/2004   Vaping Use    Vaping Use: Former   Substance and Sexual Activity    Alcohol use: No    Drug use: No    Sexual activity: Not on file   Other Topics Concern    Not on file   Social History Narrative     and lives in home with 3 flight of steps     Social Determinants of Health     Financial Resource Strain: Not on file   Food Insecurity: Not on file   Transportation Needs: Not on file   Physical Activity: Not on file   Stress: Not on file   Social Connections: Not on file   Interpersonal Safety: Not on file   Housing Stability: Not on file       Current Outpatient Medications   Medication Sig Dispense Refill    acetaminophen (TYLENOL) 500 MG tablet Take 1,000 mg by mouth every 8 hours as needed for mild pain or fever       acetylcysteine (MUCOMYST) 10 % nebulizer solution Inhale 4 mLs into the lungs every 4 hours as needed for mucolysis/respiratory distress      albuterol (PROAIR HFA/PROVENTIL HFA/VENTOLIN HFA) 108 (90 Base) MCG/ACT inhaler Inhale 2 puffs into the lungs every 6 hours as needed for shortness of breath, wheezing or cough      amoxicillin-clavulanate (AUGMENTIN) 500-125 MG tablet Take 1 tablet by mouth every 12 hours 1 tablet 0    Azelastine HCl 137 MCG/SPRAY SOLN Spray 1 spray into both nostrils 2 times daily as needed for rhinitis      benzonatate (TESSALON PERLES) 100 MG capsule Take 1 capsule (100 mg) by mouth 3 times daily as needed for cough 90 capsule 3    bumetanide (BUMEX) 1 MG tablet Take 1 tablet (1 mg) by mouth 2 times daily 60 tablet 0    CALCIUM-MAGNESIUM-ZINC PO Take 1 tablet by mouth daily      cetirizine (ZYRTEC) 5 MG tablet Take 1 tablet (5 mg) by mouth daily as needed for allergies      chlorhexidine (PERIDEX) 0.12 % solution [CHLORHEXIDINE (PERIDEX) 0.12 % SOLUTION] Apply 15 mL to the mouth or throat at bedtime as needed.       escitalopram (LEXAPRO) 10 MG tablet Take 1 tablet (10 mg) by mouth daily 90 tablet 3    famotidine (PEPCID) 20 MG tablet Take 1  tablet (20 mg) by mouth every other day 90 tablet 0    fluticasone (FLONASE) 50 MCG/ACT nasal spray Spray 1 spray into both nostrils daily as needed for rhinitis or allergies      Fluticasone-Umeclidin-Vilanterol (TRELEGY ELLIPTA) 200-62.5-25 MCG/INH oral inhaler Inhale 1 puff into the lungs At Bedtime      gabapentin (NEURONTIN) 300 MG capsule Take 1 capsule (300 mg) by mouth at bedtime 30 capsule 0    ipratropium (ATROVENT HFA) 17 MCG/ACT inhaler Inhale 2 puffs into the lungs every 6 hours as needed for wheezing      ipratropium - albuterol 0.5 mg/2.5 mg/3 mL (DUONEB) 0.5-2.5 (3) MG/3ML neb solution Take 1 vial by nebulization every 6 hours as needed for shortness of breath, wheezing or cough      Melatonin 5 MG TBDP Take 15 mg by mouth At Bedtime      metoprolol succinate ER (TOPROL XL) 50 MG 24 hr tablet Take 1 tablet (50 mg) by mouth At Bedtime 30 tablet 0    mirabegron (MYRBETRIQ) 25 MG 24 hr tablet Take 1 tablet (25 mg) by mouth daily 30 tablet 0    multivitamin w/minerals (THERA-VIT-M) tablet Take 1 tablet by mouth daily      nystatin (MYCOSTATIN) 422525 UNIT/GM external cream Apply topically 3 times daily as needed for dry skin      OLANZapine (ZYPREXA) 5 MG tablet Take 1 tablet (5 mg) by mouth At Bedtime 30 tablet 3    predniSONE (DELTASONE) 10 MG tablet Take 1 tablet (10 mg) by mouth daily 5 tablet 0    prochlorperazine (COMPAZINE) 10 MG tablet Take 1 tablet (10 mg) by mouth every 6 hours as needed (Nausea/Vomiting) 30 tablet 5    rivaroxaban ANTICOAGULANT (XARELTO) 15 MG TABS tablet Take 1 tablet (15 mg) by mouth At Bedtime      simvastatin (ZOCOR) 40 MG tablet Take 1 tablet (40 mg) by mouth At Bedtime 90 tablet 3    sodium chloride 0.9 % neb solution Take 3 mLs by nebulization every 3 hours as needed for wheezing      solifenacin (VESICARE) 5 MG tablet Take 1 tablet (5 mg) by mouth daily 90 tablet 3    sotorasib (LUMAKRAS) 120 MG tablet Take 6 tablets (720 mg) by mouth daily for 30 days Do not chew,  crush or split tablets. 180 tablet 0    spironolactone (ALDACTONE) 25 MG tablet Take 1 tablet (25 mg) by mouth daily 30 tablet 0    tamoxifen (NOLVADEX) 20 MG tablet Take 1 tablet (20 mg) by mouth daily 90 tablet 3    traMADol (ULTRAM) 50 MG tablet Take 1 tablet (50 mg) by mouth 2 times daily as needed for severe pain 60 tablet 0    vitamin D3 (CHOLECALCIFEROL) 50 mcg (2000 units) tablet Take 50 mcg by mouth daily      zinc oxide (DESITIN) 40 % external ointment Apply topically as needed for dry skin or irritation (Twice a day to gluteal area skin irritation) 113 g 1     Allergies   Allergen Reactions    Sulfa (Sulfonamide Antibiotics) [Sulfa Antibiotics] Rash     Headaches and dizziness.    Homeopathic Drugs [External Allergen Needs Reconciliation - See Comment] Unknown     runny nose    Mold Extracts [Molds & Smuts] Unknown    Morphine (Pf) [Morphine] Unknown     hallucinate    Lisinopril Itching, Cough and Unknown     cough    Sulfacetamide Sodium [Sulfacetamide] Rash     Immunization History   Administered Date(s) Administered    COVID-19 Bivalent 12+ (Pfizer) 03/02/2021, 12/08/2021, 11/16/2022    COVID-19 MONOVALENT 12+ (Pfizer) 03/02/2021, 03/23/2021, 12/08/2021    COVID-19 Monovalent 18+ (Moderna) 03/02/2021    FLU 6-35 months 11/03/2003    Flu 65+ Years 09/20/2017, 09/25/2018, 10/10/2019    Flu, Unspecified 10/01/2016, 10/01/2018    [id number] Novel Flu 01/05/2010    Influenza (H1N1) 01/05/2010    Influenza (High Dose) 3 valent vaccine 10/30/2014, 10/01/2015, 11/26/2016, 09/25/2018, 10/10/2019, 10/19/2020, 11/16/2022    Influenza (IIV3) PF 11/03/2003, 11/08/2006, 10/24/2007, 11/28/2008, 10/09/2009, 10/22/2010, 01/26/2012, 10/03/2012, 11/01/2013    Influenza Vaccine 65+ (Fluzone HD) 09/20/2017, 10/19/2020, 12/15/2020, 11/16/2022    Mantoux Tuberculin Skin Test 12/13/2007, 10/24/2020, 11/03/2020    Pneumo Conj 13-V (2010&after) 05/11/2015    Pneumococcal 23 valent 11/24/1997, 11/21/2007, 05/17/2017    TD,PF 7+  "([facility]) 07/20/2004    TDAP (Adacel,Boostrix) 02/12/2016    TDAP Vaccine (Boostrix) 02/12/2016    Zoster recombinant adjuvanted (SHINGRIX) 07/31/2020    Zoster vaccine, live 02/18/2009, 10/20/2014, 07/31/2020         Post Discharge Medication Reconciliation Status: discharge medications reconciled and changed, per note/orders.    Medications list and allergies in the facility chart have been reviewed.  Please see facility EMR for most up to date list.         Review of Systems   Patient denies fever, chills, headache, lightheadedness, dizziness, rhinorrhea, cough, congestion,  palpitations, abdominal pain, n/v, diarrhea, constipation, change in appetite, change in sleep pattern, dysuria, frequency, burning or pain with urination.  Other than stated in HPI all other review of systems is negative.       Physical Exam  Vital signs:/69   Pulse 83   Temp 98.1  F (36.7  C)   Resp 18   Ht 1.575 m (5' 2\")   Wt 51.7 kg (114 lb)   SpO2 93%   BMI 20.85 kg/m     GENERAL APPEARANCE: thin, frail elderly female,  in no acute distress.  HEENT: normocephalic, atraumatic  sclerae anicteric, conjunctivae clear and moist, EOM intact  LUNGS: diminished LLL, no adventitious sounds, respiratory effort normal.  CARD: RRR, S1, S2, without murmurs, gallops, rubs  ABD: Soft, nondistended and nontender with normal bowel sounds.   MSK: Muscle strength and tone were equal bilaterally. Moves all extremities easily and intentionally.   EXTREMITIES: No cyanosis, clubbing or edema.  NEURO: Alert and oriented x 3. Face is symmetric.  SKIN: hemosiderin staining of shins and purpura and scabbing noted.   PSYCH: euthymic        Labs:    Last Comprehensive Metabolic Panel:  Lab Results   Component Value Date     10/05/2023    POTASSIUM 3.6 10/05/2023    CHLORIDE 107 10/05/2023    CO2 27 10/05/2023    ANIONGAP 9 10/05/2023    GLC 90 10/05/2023    BUN 24.3 (H) 10/05/2023    CR 1.21 (H) 10/05/2023    GFRESTIMATED 46 (L) 10/05/2023    MESSI " 8.3 (L) 10/05/2023       Lab Results   Component Value Date    WBC 15.7 10/05/2023     Lab Results   Component Value Date    RBC 3.54 10/05/2023     Lab Results   Component Value Date    HGB 10.1 10/05/2023     Lab Results   Component Value Date    HCT 32.2 10/05/2023     Lab Results   Component Value Date    MCV 91 10/05/2023     Lab Results   Component Value Date    MCH 28.5 10/05/2023     Lab Results   Component Value Date    MCHC 31.4 10/05/2023     Lab Results   Component Value Date    RDW 15.4 10/05/2023     Lab Results   Component Value Date     10/05/2023           Assessment/plan:   Pulmonary emphysema, unspecified emphysema type (H)  Acute and chronic respiratory failure with hypoxia (H)  COPD exacerbation (H)  Orders written for O2 adjustments in the home.  Okay for patient to keep her albuterol inhaler at bedside and self admin.  Augmentin completed today.  Continue with prednisone through 10/11.  Question if she will need alternative placement at discharge due to no caregiver.      Acute on chronic diastolic congestive heart failure (H)  Stable, check weights MWF.  Continue with Bumex and spironolactone.  Lasix was dc'd.  Continue with Duonececi    Lung Cancer  Adrenal Mass  Follow up with oncology.  Family to supply home sotorasib.      Pneumonia due to infectious organism, unspecified laterality, unspecified part of lung  Symptoms improving, counseled on s/s of aspiration.  Denies any swallowing issues.  Augmentin completed today.      Factor 5 Leiden mutation, heterozygous (H24)  Noted, continue with home Xarelto.     Gastroesophageal reflux disease with esophagitis without hemorrhage  Continue with TUMS and famotidine.     45 total minutes spent in review of medical records from [facility], assessing patient and counseling and coordinating on the above plan of care.     Electronically signed by: Rayna Carbajal NP

## 2023-10-09 PROBLEM — I73.9 PAD (PERIPHERAL ARTERY DISEASE) (H): Status: ACTIVE | Noted: 2023-01-01

## 2023-10-09 NOTE — LETTER
10/9/2023        RE: Irene León  [address] [facility] [address]        [facility]       Patient Irene León  MRN: [id number]        Reason for Visit     Chief Complaint   Patient presents with     RECHECK     INITIAL       Code Status     DNR / DNI    Assessment     COPD  Chronic oxygen dependence  HFrEF  Breast cancer  Lung cancer on oral chemotherapy  CKS  Factor V Leiden  Hyperlipidemia  Hypertension  Depression  Anxiety  CKD  GERD    Plan     Pt is admitted to TCU for strengthening and rehab.    Pulmonary emphysema, unspecified emphysema type (H)  Acute and chronic respiratory failure with hypoxia (H)  COPD exacerbation (H)  Completed antibiotic course. Supplemental oxygen as needed. Continue to try to wean to home baseline of 3L. Continue with prednisone through 10/11.     Acute on chronic diastolic congestive heart failure (H)  Stable, check weights MWF.  Continue with Bumex and spironolactone. Continue with Duonebs    Essential hypertension  Metoprolol 50 mg bedtime    Anxiety/Depression  Lexapro 10 mg daily.  Zyprexa 5mg bedtime.     Breast cancer  Lung Cancer  Adrenal Mass  Follow up with oncology.  Family to supply home sotorasib.  Tamoxifen.     Factor 5 Leiden mutation, heterozygous (H24)  Continue with home Xarelto. This has been approved by heme/onc specialist.     Gastroesophageal reflux disease with esophagitis without hemorrhage  Continue with TUMS and famotidine.    Hyperlipidemia  Simvostatin 40 mg daily. Last known labs completed in 2018 and controlled then.    Discontinue all not needed medications including multivitamin, vitamin D3, and calcium-magnesium.    History     Patient is a very pleasant 78 year old female who is admitted to TCU    Hospitalized for acute hypoxic respiratory failure secondary to COPD and CHF. Noted to have aspiration pneumonitis. Improved with ceftriaxone, doxycyline, and prednisone. Discharged with Augmentin which patient has finished.  Cardiology changed lasix to Bumex.   CT also showed hemorrhage of adrenal mass. Heme/Onc okayed to resume Xarelto and patient wanted to restart sotorasib.    Patient usually on 3L of oxygen at baseline and 5-6 with exacerbations. Currently, she is on 5L.     was previous caregiver of patient. He has recently passed away.    Past Medical & Surgical History     PAST MEDICAL HISTORY:   Past Medical History:   Diagnosis Date     ANATOLIY (acute kidney injury) (H24)      Allergic rhinitis      Arrhythmia      Atrial fibrillation with RVR (H)      Bacteremia      Breast cancer (H) 2017     Cardiomyopathy (H)      Centrilobular emphysema (H)      CHF (congestive heart failure) (H)      Chronic kidney disease      CKD (chronic kidney disease)      COPD (chronic obstructive pulmonary disease) (H)      Coronary artery disease      Depression      Dysphagia      E. coli sepsis (H)      Factor 5 Leiden mutation, heterozygous (H24)      GERD (gastroesophageal reflux disease)      Heart failure with reduced ejection fraction (H) 04/17/2023     Hx of radiation therapy 2017     Hyperlipidemia      Hypertension      Hypokalemia      Hypomagnesemia      Idiopathic cardiomyopathy (H)      Left hip pain 04/30/2014     OAB (overactive bladder)      Osteoporosis      Sacral insufficiency fracture      Sinusitis      Syncope      Urge incontinence      Vocal cord dysfunction       PAST SURGICAL HISTORY:   has a past surgical history that includes IR Abdominal Aortogram (4/16/2003); IR Miscellaneous Procedure (4/16/2003); IR Aortic Arch 4 Vessel Angiogram (4/16/2003); Arthroplasty revision hip (Left); OPEN TX FEMORAL FRACTURE DISTAL MED/LAT CONDYLE (Left, 10/28/2015); Cardiac catheterization; Cataract Extraction (Left, 07/18/2017); Biopsy breast (Right, 2017); Bladder surgery; Lumpectomy breast (Left, 06/2017); PICC/Midline Placement (4/7/2022); and PICC/Midline Placement (4/11/2022).      Past Social History     Reviewed,  reports that  she quit smoking about 15 years ago. Her smoking use included cigarettes. She quit smokeless tobacco use about 19 years ago. She reports that she does not drink alcohol and does not use drugs.    Family History     Reviewed, and family history includes Breast Cancer in her maternal aunt; Osteoporosis in an other family member; Prostate Cancer in her brother and maternal uncle.    Medication List     Current Outpatient Medications   Medication     acetaminophen (TYLENOL) 500 MG tablet     acetylcysteine (MUCOMYST) 10 % nebulizer solution     albuterol (PROAIR HFA/PROVENTIL HFA/VENTOLIN HFA) 108 (90 Base) MCG/ACT inhaler     amoxicillin-clavulanate (AUGMENTIN) 500-125 MG tablet     Azelastine HCl 137 MCG/SPRAY SOLN     benzonatate (TESSALON PERLES) 100 MG capsule     bumetanide (BUMEX) 1 MG tablet     CALCIUM-MAGNESIUM-ZINC PO     cetirizine (ZYRTEC) 5 MG tablet     chlorhexidine (PERIDEX) 0.12 % solution     escitalopram (LEXAPRO) 10 MG tablet     famotidine (PEPCID) 20 MG tablet     fluticasone (FLONASE) 50 MCG/ACT nasal spray     Fluticasone-Umeclidin-Vilanterol (TRELEGY ELLIPTA) 200-62.5-25 MCG/INH oral inhaler     gabapentin (NEURONTIN) 300 MG capsule     ipratropium (ATROVENT HFA) 17 MCG/ACT inhaler     ipratropium - albuterol 0.5 mg/2.5 mg/3 mL (DUONEB) 0.5-2.5 (3) MG/3ML neb solution     Melatonin 5 MG TBDP     metoprolol succinate ER (TOPROL XL) 50 MG 24 hr tablet     mirabegron (MYRBETRIQ) 25 MG 24 hr tablet     multivitamin w/minerals (THERA-VIT-M) tablet     nystatin (MYCOSTATIN) 126428 UNIT/GM external cream     OLANZapine (ZYPREXA) 5 MG tablet     predniSONE (DELTASONE) 10 MG tablet     prochlorperazine (COMPAZINE) 10 MG tablet     rivaroxaban ANTICOAGULANT (XARELTO) 15 MG TABS tablet     simvastatin (ZOCOR) 40 MG tablet     sodium chloride 0.9 % neb solution     solifenacin (VESICARE) 5 MG tablet     sotorasib (LUMAKRAS) 120 MG tablet     spironolactone (ALDACTONE) 25 MG tablet     tamoxifen (NOLVADEX)  "20 MG tablet     traMADol (ULTRAM) 50 MG tablet     vitamin D3 (CHOLECALCIFEROL) 50 mcg (2000 units) tablet     zinc oxide (DESITIN) 40 % external ointment     No current facility-administered medications for this visit.      MED REC REQUIRED  Post Medication Reconciliation Status: discharge medications reconciled and changed, per note/orders       Allergies     Allergies   Allergen Reactions     Sulfa (Sulfonamide Antibiotics) [Sulfa Antibiotics] Rash     Headaches and dizziness.     Homeopathic Drugs [External Allergen Needs Reconciliation - See Comment] Unknown     runny nose     Mold Extracts [Molds & Smuts] Unknown     Morphine (Pf) [Morphine] Unknown     hallucinate     Lisinopril Itching, Cough and Unknown     cough     Sulfacetamide Sodium [Sulfacetamide] Rash       Review of Systems   A comprehensive review of 14 systems was done. Pertinent findings noted here and in history of present illness. All the rest negative.  Constitutional: Negative.  Negative for fever, chills, she has  activity change, appetite change and fatigue.   HENT: Negative for congestion and facial swelling.    Eyes: Negative for photophobia, redness and visual disturbance.   Respiratory: Negative for cough and chest tightness.    On supplemental o2 and reporting air hunger and tachypnea mostly precipitated by anxiety  Cardiovascular: Negative for chest pain, palpitations and leg swelling.   Gastrointestinal: Negative for nausea, diarrhea, constipation, blood in stool and abdominal distention.   Genitourinary: has frequency  Musculoskeletal: Positive for increased weakness.   Skin: Ecchymoses diffusely on body.    Neurological: Negative for dizziness, tremors, syncope, weakness, light-headedness and headaches.   Hematological: Does bruise/bleed easily - on Xarelto.   Psychiatric/Behavioral: has severe anxiety        Physical Exam   /69   Pulse 80   Temp 98.4  F (36.9  C)   Resp 18   Ht 1.575 m (5' 2\")   Wt 51.7 kg (114 lb)   " SpO2 93%   BMI 20.85 kg/m     On 3l of o2  Constitutional: Oriented to person, place, and time.   HEENT:  Normocephalic and atraumatic.  Eyes: Conjunctivae normal. No discharge.  No scleral icterus. Nose normal. Mouth/Throat: Oropharynx is clear and moist.    CARDIOVASCULAR: Normal rate, regular rhythm and intact distal pulses.  PULMONARY: Effort normal and breath sounds normal. No respiratory distress. No Wheezing or rales.  ABDOMEN: Soft. No distension and no mass. There is no tenderness.  NEUROLOGICAL: Alert and oriented to person, place, and time. Coordination normal.   SKIN: Skin is warm and dry. No rash noted. No pallor. Bilateral lower extremity bleeding underneath the skin.  Scattered ecchymosis seen in arms and legs with OA area  EXTREMITIES: No cyanosis, no edema.  PSYCHIATRIC: Oriented to person, place, and time.      Lab Results     Recent Results (from the past 240 hour(s))   Basic metabolic panel    Collection Time: 09/30/23  5:06 AM   Result Value Ref Range    Sodium 143 135 - 145 mmol/L    Potassium 4.3 3.4 - 5.3 mmol/L    Chloride 108 (H) 98 - 107 mmol/L    Carbon Dioxide (CO2) 26 22 - 29 mmol/L    Anion Gap 9 7 - 15 mmol/L    Urea Nitrogen 35.1 (H) 8.0 - 23.0 mg/dL    Creatinine 1.62 (H) 0.51 - 0.95 mg/dL    GFR Estimate 32 (L) >60 mL/min/1.73m2    Calcium 8.6 (L) 8.8 - 10.2 mg/dL    Glucose 85 70 - 99 mg/dL   ECG 12-LEAD WITH MUSE (LHE)    Collection Time: 09/30/23  5:09 AM   Result Value Ref Range    Systolic Blood Pressure  mmHg    Diastolic Blood Pressure  mmHg    Ventricular Rate 60 BPM    Atrial Rate 60 BPM    SC Interval 132 ms    QRS Duration 124 ms     ms    QTc 492 ms    P Axis 56 degrees    R AXIS -54 degrees    T Axis 102 degrees    Interpretation ECG       Sinus rhythm  Left axis deviation  Left bundle branch block  Abnormal ECG  When compared with ECG of 28-SEP-2023 13:07,  Premature ventricular complexes are no longer Present  Confirmed by TAMMY MALAGON MD LOC: ([id number]) on  10/2/2023 8:08:08 AM     CBC with platelets and differential    Collection Time: 09/30/23  6:47 AM   Result Value Ref Range    WBC Count 20.4 (H) 4.0 - 11.0 10e3/uL    RBC Count 3.66 (L) 3.80 - 5.20 10e6/uL    Hemoglobin 10.4 (L) 11.7 - 15.7 g/dL    Hematocrit 33.9 (L) 35.0 - 47.0 %    MCV 93 78 - 100 fL    MCH 28.4 26.5 - 33.0 pg    MCHC 30.7 (L) 31.5 - 36.5 g/dL    RDW 14.4 10.0 - 15.0 %    Platelet Count 177 150 - 450 10e3/uL    % Neutrophils 81 %    % Lymphocytes 8 %    % Monocytes 10 %    % Eosinophils 0 %    % Basophils 0 %    % Immature Granulocytes 1 %    NRBCs per 100 WBC 0 <1 /100    Absolute Neutrophils 16.5 (H) 1.6 - 8.3 10e3/uL    Absolute Lymphocytes 1.6 0.8 - 5.3 10e3/uL    Absolute Monocytes 2.0 (H) 0.0 - 1.3 10e3/uL    Absolute Eosinophils 0.0 0.0 - 0.7 10e3/uL    Absolute Basophils 0.0 0.0 - 0.2 10e3/uL    Absolute Immature Granulocytes 0.2 <=0.4 10e3/uL    Absolute NRBCs 0.0 10e3/uL   Basic metabolic panel    Collection Time: 10/01/23  4:24 AM   Result Value Ref Range    Sodium 144 135 - 145 mmol/L    Potassium 3.7 3.4 - 5.3 mmol/L    Chloride 110 (H) 98 - 107 mmol/L    Carbon Dioxide (CO2) 28 22 - 29 mmol/L    Anion Gap 6 (L) 7 - 15 mmol/L    Urea Nitrogen 35.8 (H) 8.0 - 23.0 mg/dL    Creatinine 1.71 (H) 0.51 - 0.95 mg/dL    GFR Estimate 30 (L) >60 mL/min/1.73m2    Calcium 8.2 (L) 8.8 - 10.2 mg/dL    Glucose 92 70 - 99 mg/dL   CBC with platelets    Collection Time: 10/01/23  4:24 AM   Result Value Ref Range    WBC Count 13.4 (H) 4.0 - 11.0 10e3/uL    RBC Count 3.07 (L) 3.80 - 5.20 10e6/uL    Hemoglobin 8.7 (L) 11.7 - 15.7 g/dL    Hematocrit 28.6 (L) 35.0 - 47.0 %    MCV 93 78 - 100 fL    MCH 28.3 26.5 - 33.0 pg    MCHC 30.4 (L) 31.5 - 36.5 g/dL    RDW 14.6 10.0 - 15.0 %    Platelet Count 175 150 - 450 10e3/uL   Comprehensive metabolic panel    Collection Time: 10/02/23  7:10 AM   Result Value Ref Range    Sodium 147 (H) 135 - 145 mmol/L    Potassium 3.8 3.4 - 5.3 mmol/L    Carbon Dioxide (CO2) 28  22 - 29 mmol/L    Anion Gap 8 7 - 15 mmol/L    Urea Nitrogen 36.1 (H) 8.0 - 23.0 mg/dL    Creatinine 1.73 (H) 0.51 - 0.95 mg/dL    GFR Estimate 30 (L) >60 mL/min/1.73m2    Calcium 8.8 8.8 - 10.2 mg/dL    Chloride 111 (H) 98 - 107 mmol/L    Glucose 77 70 - 99 mg/dL    Alkaline Phosphatase 36 35 - 104 U/L    AST 24 0 - 45 U/L    ALT <5 0 - 50 U/L    Protein Total 6.3 (L) 6.4 - 8.3 g/dL    Albumin 3.0 (L) 3.5 - 5.2 g/dL    Bilirubin Total 0.3 <=1.2 mg/dL   CBC with platelets and differential    Collection Time: 10/02/23  7:10 AM   Result Value Ref Range    WBC Count 13.2 (H) 4.0 - 11.0 10e3/uL    RBC Count 3.58 (L) 3.80 - 5.20 10e6/uL    Hemoglobin 10.2 (L) 11.7 - 15.7 g/dL    Hematocrit 33.2 (L) 35.0 - 47.0 %    MCV 93 78 - 100 fL    MCH 28.5 26.5 - 33.0 pg    MCHC 30.7 (L) 31.5 - 36.5 g/dL    RDW 14.6 10.0 - 15.0 %    Platelet Count 218 150 - 450 10e3/uL    % Neutrophils 68 %    % Lymphocytes 15 %    % Monocytes 11 %    % Eosinophils 4 %    % Basophils 1 %    % Immature Granulocytes 1 %    NRBCs per 100 WBC 0 <1 /100    Absolute Neutrophils 9.0 (H) 1.6 - 8.3 10e3/uL    Absolute Lymphocytes 2.0 0.8 - 5.3 10e3/uL    Absolute Monocytes 1.5 (H) 0.0 - 1.3 10e3/uL    Absolute Eosinophils 0.5 0.0 - 0.7 10e3/uL    Absolute Basophils 0.1 0.0 - 0.2 10e3/uL    Absolute Immature Granulocytes 0.2 <=0.4 10e3/uL    Absolute NRBCs 0.0 10e3/uL   CBC with platelets    Collection Time: 10/03/23  5:33 AM   Result Value Ref Range    WBC Count 13.0 (H) 4.0 - 11.0 10e3/uL    RBC Count 3.24 (L) 3.80 - 5.20 10e6/uL    Hemoglobin 9.2 (L) 11.7 - 15.7 g/dL    Hematocrit 30.0 (L) 35.0 - 47.0 %    MCV 93 78 - 100 fL    MCH 28.4 26.5 - 33.0 pg    MCHC 30.7 (L) 31.5 - 36.5 g/dL    RDW 14.8 10.0 - 15.0 %    Platelet Count 185 150 - 450 10e3/uL   Basic metabolic panel    Collection Time: 10/03/23  5:33 AM   Result Value Ref Range    Sodium 144 135 - 145 mmol/L    Potassium 3.3 (L) 3.4 - 5.3 mmol/L    Chloride 110 (H) 98 - 107 mmol/L    Carbon Dioxide  (CO2) 27 22 - 29 mmol/L    Anion Gap 7 7 - 15 mmol/L    Urea Nitrogen 30.3 (H) 8.0 - 23.0 mg/dL    Creatinine 1.43 (H) 0.51 - 0.95 mg/dL    GFR Estimate 37 (L) >60 mL/min/1.73m2    Calcium 8.1 (L) 8.8 - 10.2 mg/dL    Glucose 75 70 - 99 mg/dL   Basic metabolic panel    Collection Time: 10/04/23  9:49 AM   Result Value Ref Range    Sodium 147 (H) 135 - 145 mmol/L    Potassium 3.1 (L) 3.4 - 5.3 mmol/L    Chloride 109 (H) 98 - 107 mmol/L    Carbon Dioxide (CO2) 28 22 - 29 mmol/L    Anion Gap 10 7 - 15 mmol/L    Urea Nitrogen 25.5 (H) 8.0 - 23.0 mg/dL    Creatinine 1.31 (H) 0.51 - 0.95 mg/dL    GFR Estimate 42 (L) >60 mL/min/1.73m2    Calcium 8.3 (L) 8.8 - 10.2 mg/dL    Glucose 88 70 - 99 mg/dL   Potassium    Collection Time: 10/04/23  4:52 PM   Result Value Ref Range    Potassium 3.8 3.4 - 5.3 mmol/L   CBC with platelets    Collection Time: 10/05/23  5:03 AM   Result Value Ref Range    WBC Count 15.7 (H) 4.0 - 11.0 10e3/uL    RBC Count 3.54 (L) 3.80 - 5.20 10e6/uL    Hemoglobin 10.1 (L) 11.7 - 15.7 g/dL    Hematocrit 32.2 (L) 35.0 - 47.0 %    MCV 91 78 - 100 fL    MCH 28.5 26.5 - 33.0 pg    MCHC 31.4 (L) 31.5 - 36.5 g/dL    RDW 15.4 (H) 10.0 - 15.0 %    Platelet Count 209 150 - 450 10e3/uL   Basic metabolic panel    Collection Time: 10/05/23  5:03 AM   Result Value Ref Range    Sodium 143 135 - 145 mmol/L    Potassium 3.6 3.4 - 5.3 mmol/L    Chloride 107 98 - 107 mmol/L    Carbon Dioxide (CO2) 27 22 - 29 mmol/L    Anion Gap 9 7 - 15 mmol/L    Urea Nitrogen 24.3 (H) 8.0 - 23.0 mg/dL    Creatinine 1.21 (H) 0.51 - 0.95 mg/dL    GFR Estimate 46 (L) >60 mL/min/1.73m2    Calcium 8.3 (L) 8.8 - 10.2 mg/dL    Glucose 90 70 - 99 mg/dL                             This patient was also seen by resident physician, Kaylee Lamb MD from the [facility].    Pt was seen and examined independently  Also met with her daughter  Discussed goals of care  Pt has lost her  2 weeks  ago  He was her main caregiver  Care for her advanced CA is palliative   Daughter concerned about prognosis ; wants hospice  Pt reports she is not ready and wants to go home  Wants to pursue PT to get stronger  Time spent is 45 min including discussing goals of care and discharge planning  Also advised pt to follow-up with oncology to discuss prognosis.    Electronically signed by    Paola Rose MD      Sincerely,        VASHTI Sanchez

## 2023-10-09 NOTE — PROGRESS NOTES
[facility] GERIATRIC SERVICES       Patient Irene León  MRN: [id number]        Reason for Visit     Chief Complaint   Patient presents with    RECHECK     INITIAL       Code Status     DNR / DNI    Assessment     COPD  Chronic oxygen dependence  HFrEF  Breast cancer  Lung cancer on oral chemotherapy  CKS  Factor V Leiden  Hyperlipidemia  Hypertension  Depression  Anxiety  CKD  GERD    Plan     Pt is admitted to TCU for strengthening and rehab.    Pulmonary emphysema, unspecified emphysema type (H)  Acute and chronic respiratory failure with hypoxia (H)  COPD exacerbation (H)  Completed antibiotic course. Supplemental oxygen as needed. Continue to try to wean to home baseline of 3L. Continue with prednisone through 10/11.     Acute on chronic diastolic congestive heart failure (H)  Stable, check weights MWF.  Continue with Bumex and spironolactone. Continue with Duonebs    Essential hypertension  Metoprolol 50 mg bedtime    Anxiety/Depression  Lexapro 10 mg daily.  Zyprexa 5mg bedtime.     Breast cancer  Lung Cancer  Adrenal Mass  Follow up with oncology.  Family to supply home sotorasib.  Tamoxifen.     Factor 5 Leiden mutation, heterozygous (H24)  Continue with home Xarelto. This has been approved by heme/onc specialist.     Gastroesophageal reflux disease with esophagitis without hemorrhage  Continue with TUMS and famotidine.    Hyperlipidemia  Simvostatin 40 mg daily. Last known labs completed in 2018 and controlled then.    Discontinue all not needed medications including multivitamin, vitamin D3, and calcium-magnesium.    History     Patient is a very pleasant 78 year old female who is admitted to TCU    Hospitalized for acute hypoxic respiratory failure secondary to COPD and CHF. Noted to have aspiration pneumonitis. Improved with ceftriaxone, doxycyline, and prednisone. Discharged with Augmentin which patient has finished. Cardiology changed lasix to Bumex.   CT also showed hemorrhage of adrenal mass.  Heme/Onc okayed to resume Xarelto and patient wanted to restart sotorasib.    Patient usually on 3L of oxygen at baseline and 5-6 with exacerbations. Currently, she is on 5L.     was previous caregiver of patient. He has recently passed away.    Past Medical & Surgical History     PAST MEDICAL HISTORY:   Past Medical History:   Diagnosis Date    ANATOLIY (acute kidney injury) (H24)     Allergic rhinitis     Arrhythmia     Atrial fibrillation with RVR (H)     Bacteremia     Breast cancer (H) 2017    Cardiomyopathy (H)     Centrilobular emphysema (H)     CHF (congestive heart failure) (H)     Chronic kidney disease     CKD (chronic kidney disease)     COPD (chronic obstructive pulmonary disease) (H)     Coronary artery disease     Depression     Dysphagia     E. coli sepsis (H)     Factor 5 Leiden mutation, heterozygous (H24)     GERD (gastroesophageal reflux disease)     Heart failure with reduced ejection fraction (H) 04/17/2023    Hx of radiation therapy 2017    Hyperlipidemia     Hypertension     Hypokalemia     Hypomagnesemia     Idiopathic cardiomyopathy (H)     Left hip pain 04/30/2014    OAB (overactive bladder)     Osteoporosis     Sacral insufficiency fracture     Sinusitis     Syncope     Urge incontinence     Vocal cord dysfunction       PAST SURGICAL HISTORY:   has a past surgical history that includes IR Abdominal Aortogram (4/16/2003); IR Miscellaneous Procedure (4/16/2003); IR Aortic Arch 4 Vessel Angiogram (4/16/2003); Arthroplasty revision hip (Left); OPEN TX FEMORAL FRACTURE DISTAL MED/LAT CONDYLE (Left, 10/28/2015); Cardiac catheterization; Cataract Extraction (Left, 07/18/2017); Biopsy breast (Right, 2017); Bladder surgery; Lumpectomy breast (Left, 06/2017); PICC/Midline Placement (4/7/2022); and PICC/Midline Placement (4/11/2022).      Past Social History     Reviewed,  reports that she quit smoking about 15 years ago. Her smoking use included cigarettes. She quit smokeless tobacco use about 19  years ago. She reports that she does not drink alcohol and does not use drugs.    Family History     Reviewed, and family history includes Breast Cancer in her maternal aunt; Osteoporosis in an other family member; Prostate Cancer in her brother and maternal uncle.    Medication List     Current Outpatient Medications   Medication    acetaminophen (TYLENOL) 500 MG tablet    acetylcysteine (MUCOMYST) 10 % nebulizer solution    albuterol (PROAIR HFA/PROVENTIL HFA/VENTOLIN HFA) 108 (90 Base) MCG/ACT inhaler    amoxicillin-clavulanate (AUGMENTIN) 500-125 MG tablet    Azelastine HCl 137 MCG/SPRAY SOLN    benzonatate (TESSALON PERLES) 100 MG capsule    bumetanide (BUMEX) 1 MG tablet    CALCIUM-MAGNESIUM-ZINC PO    cetirizine (ZYRTEC) 5 MG tablet    chlorhexidine (PERIDEX) 0.12 % solution    escitalopram (LEXAPRO) 10 MG tablet    famotidine (PEPCID) 20 MG tablet    fluticasone (FLONASE) 50 MCG/ACT nasal spray    Fluticasone-Umeclidin-Vilanterol (TRELEGY ELLIPTA) 200-62.5-25 MCG/INH oral inhaler    gabapentin (NEURONTIN) 300 MG capsule    ipratropium (ATROVENT HFA) 17 MCG/ACT inhaler    ipratropium - albuterol 0.5 mg/2.5 mg/3 mL (DUONEB) 0.5-2.5 (3) MG/3ML neb solution    Melatonin 5 MG TBDP    metoprolol succinate ER (TOPROL XL) 50 MG 24 hr tablet    mirabegron (MYRBETRIQ) 25 MG 24 hr tablet    multivitamin w/minerals (THERA-VIT-M) tablet    nystatin (MYCOSTATIN) 797126 UNIT/GM external cream    OLANZapine (ZYPREXA) 5 MG tablet    predniSONE (DELTASONE) 10 MG tablet    prochlorperazine (COMPAZINE) 10 MG tablet    rivaroxaban ANTICOAGULANT (XARELTO) 15 MG TABS tablet    simvastatin (ZOCOR) 40 MG tablet    sodium chloride 0.9 % neb solution    solifenacin (VESICARE) 5 MG tablet    sotorasib (LUMAKRAS) 120 MG tablet    spironolactone (ALDACTONE) 25 MG tablet    tamoxifen (NOLVADEX) 20 MG tablet    traMADol (ULTRAM) 50 MG tablet    vitamin D3 (CHOLECALCIFEROL) 50 mcg (2000 units) tablet    zinc oxide (DESITIN) 40 % external  "ointment     No current facility-administered medications for this visit.      MED REC REQUIRED  Post Medication Reconciliation Status: discharge medications reconciled and changed, per note/orders       Allergies     Allergies   Allergen Reactions    Sulfa (Sulfonamide Antibiotics) [Sulfa Antibiotics] Rash     Headaches and dizziness.    Homeopathic Drugs [External Allergen Needs Reconciliation - See Comment] Unknown     runny nose    Mold Extracts [Molds & Smuts] Unknown    Morphine (Pf) [Morphine] Unknown     hallucinate    Lisinopril Itching, Cough and Unknown     cough    Sulfacetamide Sodium [Sulfacetamide] Rash       Review of Systems   A comprehensive review of 14 systems was done. Pertinent findings noted here and in history of present illness. All the rest negative.  Constitutional: Negative.  Negative for fever, chills, she has  activity change, appetite change and fatigue.   HENT: Negative for congestion and facial swelling.    Eyes: Negative for photophobia, redness and visual disturbance.   Respiratory: Negative for cough and chest tightness.    On supplemental o2 and reporting air hunger and tachypnea mostly precipitated by anxiety  Cardiovascular: Negative for chest pain, palpitations and leg swelling.   Gastrointestinal: Negative for nausea, diarrhea, constipation, blood in stool and abdominal distention.   Genitourinary: has frequency  Musculoskeletal: Positive for increased weakness.   Skin: Ecchymoses diffusely on body.    Neurological: Negative for dizziness, tremors, syncope, weakness, light-headedness and headaches.   Hematological: Does bruise/bleed easily - on Xarelto.   Psychiatric/Behavioral: has severe anxiety        Physical Exam   /69   Pulse 80   Temp 98.4  F (36.9  C)   Resp 18   Ht 1.575 m (5' 2\")   Wt 51.7 kg (114 lb)   SpO2 93%   BMI 20.85 kg/m     On 3l of o2  Constitutional: Oriented to person, place, and time.   HEENT:  Normocephalic and atraumatic.  Eyes: " Conjunctivae normal. No discharge.  No scleral icterus. Nose normal. Mouth/Throat: Oropharynx is clear and moist.    CARDIOVASCULAR: Normal rate, regular rhythm and intact distal pulses.  PULMONARY: Effort normal and breath sounds normal. No respiratory distress. No Wheezing or rales.  ABDOMEN: Soft. No distension and no mass. There is no tenderness.  NEUROLOGICAL: Alert and oriented to person, place, and time. Coordination normal.   SKIN: Skin is warm and dry. No rash noted. No pallor. Bilateral lower extremity bleeding underneath the skin.  Scattered ecchymosis seen in arms and legs with OA area  EXTREMITIES: No cyanosis, no edema.  PSYCHIATRIC: Oriented to person, place, and time.      Lab Results     Recent Results (from the past 240 hour(s))   Basic metabolic panel    Collection Time: 09/30/23  5:06 AM   Result Value Ref Range    Sodium 143 135 - 145 mmol/L    Potassium 4.3 3.4 - 5.3 mmol/L    Chloride 108 (H) 98 - 107 mmol/L    Carbon Dioxide (CO2) 26 22 - 29 mmol/L    Anion Gap 9 7 - 15 mmol/L    Urea Nitrogen 35.1 (H) 8.0 - 23.0 mg/dL    Creatinine 1.62 (H) 0.51 - 0.95 mg/dL    GFR Estimate 32 (L) >60 mL/min/1.73m2    Calcium 8.6 (L) 8.8 - 10.2 mg/dL    Glucose 85 70 - 99 mg/dL   ECG 12-LEAD WITH MUSE (LHE)    Collection Time: 09/30/23  5:09 AM   Result Value Ref Range    Systolic Blood Pressure  mmHg    Diastolic Blood Pressure  mmHg    Ventricular Rate 60 BPM    Atrial Rate 60 BPM    CT Interval 132 ms    QRS Duration 124 ms     ms    QTc 492 ms    P Axis 56 degrees    R AXIS -54 degrees    T Axis 102 degrees    Interpretation ECG       Sinus rhythm  Left axis deviation  Left bundle branch block  Abnormal ECG  When compared with ECG of 28-SEP-2023 13:07,  Premature ventricular complexes are no longer Present  Confirmed by VESNA LUCIO, LES LOC:ANIBAL ([id number]) on 10/2/2023 8:08:08 AM     CBC with platelets and differential    Collection Time: 09/30/23  6:47 AM   Result Value Ref Range    WBC Count 20.4 (H)  4.0 - 11.0 10e3/uL    RBC Count 3.66 (L) 3.80 - 5.20 10e6/uL    Hemoglobin 10.4 (L) 11.7 - 15.7 g/dL    Hematocrit 33.9 (L) 35.0 - 47.0 %    MCV 93 78 - 100 fL    MCH 28.4 26.5 - 33.0 pg    MCHC 30.7 (L) 31.5 - 36.5 g/dL    RDW 14.4 10.0 - 15.0 %    Platelet Count 177 150 - 450 10e3/uL    % Neutrophils 81 %    % Lymphocytes 8 %    % Monocytes 10 %    % Eosinophils 0 %    % Basophils 0 %    % Immature Granulocytes 1 %    NRBCs per 100 WBC 0 <1 /100    Absolute Neutrophils 16.5 (H) 1.6 - 8.3 10e3/uL    Absolute Lymphocytes 1.6 0.8 - 5.3 10e3/uL    Absolute Monocytes 2.0 (H) 0.0 - 1.3 10e3/uL    Absolute Eosinophils 0.0 0.0 - 0.7 10e3/uL    Absolute Basophils 0.0 0.0 - 0.2 10e3/uL    Absolute Immature Granulocytes 0.2 <=0.4 10e3/uL    Absolute NRBCs 0.0 10e3/uL   Basic metabolic panel    Collection Time: 10/01/23  4:24 AM   Result Value Ref Range    Sodium 144 135 - 145 mmol/L    Potassium 3.7 3.4 - 5.3 mmol/L    Chloride 110 (H) 98 - 107 mmol/L    Carbon Dioxide (CO2) 28 22 - 29 mmol/L    Anion Gap 6 (L) 7 - 15 mmol/L    Urea Nitrogen 35.8 (H) 8.0 - 23.0 mg/dL    Creatinine 1.71 (H) 0.51 - 0.95 mg/dL    GFR Estimate 30 (L) >60 mL/min/1.73m2    Calcium 8.2 (L) 8.8 - 10.2 mg/dL    Glucose 92 70 - 99 mg/dL   CBC with platelets    Collection Time: 10/01/23  4:24 AM   Result Value Ref Range    WBC Count 13.4 (H) 4.0 - 11.0 10e3/uL    RBC Count 3.07 (L) 3.80 - 5.20 10e6/uL    Hemoglobin 8.7 (L) 11.7 - 15.7 g/dL    Hematocrit 28.6 (L) 35.0 - 47.0 %    MCV 93 78 - 100 fL    MCH 28.3 26.5 - 33.0 pg    MCHC 30.4 (L) 31.5 - 36.5 g/dL    RDW 14.6 10.0 - 15.0 %    Platelet Count 175 150 - 450 10e3/uL   Comprehensive metabolic panel    Collection Time: 10/02/23  7:10 AM   Result Value Ref Range    Sodium 147 (H) 135 - 145 mmol/L    Potassium 3.8 3.4 - 5.3 mmol/L    Carbon Dioxide (CO2) 28 22 - 29 mmol/L    Anion Gap 8 7 - 15 mmol/L    Urea Nitrogen 36.1 (H) 8.0 - 23.0 mg/dL    Creatinine 1.73 (H) 0.51 - 0.95 mg/dL    GFR Estimate  30 (L) >60 mL/min/1.73m2    Calcium 8.8 8.8 - 10.2 mg/dL    Chloride 111 (H) 98 - 107 mmol/L    Glucose 77 70 - 99 mg/dL    Alkaline Phosphatase 36 35 - 104 U/L    AST 24 0 - 45 U/L    ALT <5 0 - 50 U/L    Protein Total 6.3 (L) 6.4 - 8.3 g/dL    Albumin 3.0 (L) 3.5 - 5.2 g/dL    Bilirubin Total 0.3 <=1.2 mg/dL   CBC with platelets and differential    Collection Time: 10/02/23  7:10 AM   Result Value Ref Range    WBC Count 13.2 (H) 4.0 - 11.0 10e3/uL    RBC Count 3.58 (L) 3.80 - 5.20 10e6/uL    Hemoglobin 10.2 (L) 11.7 - 15.7 g/dL    Hematocrit 33.2 (L) 35.0 - 47.0 %    MCV 93 78 - 100 fL    MCH 28.5 26.5 - 33.0 pg    MCHC 30.7 (L) 31.5 - 36.5 g/dL    RDW 14.6 10.0 - 15.0 %    Platelet Count 218 150 - 450 10e3/uL    % Neutrophils 68 %    % Lymphocytes 15 %    % Monocytes 11 %    % Eosinophils 4 %    % Basophils 1 %    % Immature Granulocytes 1 %    NRBCs per 100 WBC 0 <1 /100    Absolute Neutrophils 9.0 (H) 1.6 - 8.3 10e3/uL    Absolute Lymphocytes 2.0 0.8 - 5.3 10e3/uL    Absolute Monocytes 1.5 (H) 0.0 - 1.3 10e3/uL    Absolute Eosinophils 0.5 0.0 - 0.7 10e3/uL    Absolute Basophils 0.1 0.0 - 0.2 10e3/uL    Absolute Immature Granulocytes 0.2 <=0.4 10e3/uL    Absolute NRBCs 0.0 10e3/uL   CBC with platelets    Collection Time: 10/03/23  5:33 AM   Result Value Ref Range    WBC Count 13.0 (H) 4.0 - 11.0 10e3/uL    RBC Count 3.24 (L) 3.80 - 5.20 10e6/uL    Hemoglobin 9.2 (L) 11.7 - 15.7 g/dL    Hematocrit 30.0 (L) 35.0 - 47.0 %    MCV 93 78 - 100 fL    MCH 28.4 26.5 - 33.0 pg    MCHC 30.7 (L) 31.5 - 36.5 g/dL    RDW 14.8 10.0 - 15.0 %    Platelet Count 185 150 - 450 10e3/uL   Basic metabolic panel    Collection Time: 10/03/23  5:33 AM   Result Value Ref Range    Sodium 144 135 - 145 mmol/L    Potassium 3.3 (L) 3.4 - 5.3 mmol/L    Chloride 110 (H) 98 - 107 mmol/L    Carbon Dioxide (CO2) 27 22 - 29 mmol/L    Anion Gap 7 7 - 15 mmol/L    Urea Nitrogen 30.3 (H) 8.0 - 23.0 mg/dL    Creatinine 1.43 (H) 0.51 - 0.95 mg/dL    GFR  Estimate 37 (L) >60 mL/min/1.73m2    Calcium 8.1 (L) 8.8 - 10.2 mg/dL    Glucose 75 70 - 99 mg/dL   Basic metabolic panel    Collection Time: 10/04/23  9:49 AM   Result Value Ref Range    Sodium 147 (H) 135 - 145 mmol/L    Potassium 3.1 (L) 3.4 - 5.3 mmol/L    Chloride 109 (H) 98 - 107 mmol/L    Carbon Dioxide (CO2) 28 22 - 29 mmol/L    Anion Gap 10 7 - 15 mmol/L    Urea Nitrogen 25.5 (H) 8.0 - 23.0 mg/dL    Creatinine 1.31 (H) 0.51 - 0.95 mg/dL    GFR Estimate 42 (L) >60 mL/min/1.73m2    Calcium 8.3 (L) 8.8 - 10.2 mg/dL    Glucose 88 70 - 99 mg/dL   Potassium    Collection Time: 10/04/23  4:52 PM   Result Value Ref Range    Potassium 3.8 3.4 - 5.3 mmol/L   CBC with platelets    Collection Time: 10/05/23  5:03 AM   Result Value Ref Range    WBC Count 15.7 (H) 4.0 - 11.0 10e3/uL    RBC Count 3.54 (L) 3.80 - 5.20 10e6/uL    Hemoglobin 10.1 (L) 11.7 - 15.7 g/dL    Hematocrit 32.2 (L) 35.0 - 47.0 %    MCV 91 78 - 100 fL    MCH 28.5 26.5 - 33.0 pg    MCHC 31.4 (L) 31.5 - 36.5 g/dL    RDW 15.4 (H) 10.0 - 15.0 %    Platelet Count 209 150 - 450 10e3/uL   Basic metabolic panel    Collection Time: 10/05/23  5:03 AM   Result Value Ref Range    Sodium 143 135 - 145 mmol/L    Potassium 3.6 3.4 - 5.3 mmol/L    Chloride 107 98 - 107 mmol/L    Carbon Dioxide (CO2) 27 22 - 29 mmol/L    Anion Gap 9 7 - 15 mmol/L    Urea Nitrogen 24.3 (H) 8.0 - 23.0 mg/dL    Creatinine 1.21 (H) 0.51 - 0.95 mg/dL    GFR Estimate 46 (L) >60 mL/min/1.73m2    Calcium 8.3 (L) 8.8 - 10.2 mg/dL    Glucose 90 70 - 99 mg/dL                             This patient was also seen by resident physician, Kaylee Lamb MD from the [facility].    Pt was seen and examined independently  Also met with her daughter  Discussed goals of care  Pt has lost her  2 weeks ago  He was her main caregiver  Care for her advanced CA is palliative   Daughter concerned about prognosis ; wants hospice  Pt reports she is not  ready and wants to go home  Wants to pursue PT to get stronger  Time spent is 45 min including discussing goals of care and discharge planning  Also advised pt to follow-up with oncology to discuss prognosis.    Electronically signed by    Paola Rose MD

## 2023-10-09 NOTE — TELEPHONE ENCOUNTER
Second message left for staff at care facility to return call to triage line, regarding call received on Friday.     Bianca Coates RN

## 2023-10-10 NOTE — TELEPHONE ENCOUNTER
Staff called back at care facility. Dr. Suarez is fine with patient doing a virtual visit on Thursday. She would like a CBC and CMP drawn before appointment. Patient did have lab work done today, ordered by provider at care facility. CBC was drawn along with a BMP. RN spoke with lab and a hepatic panel can be added to the BMP to be the equivalent of a CMP.    Bianca Coates RN

## 2023-10-10 NOTE — LETTER
"10/10/2023    Pankaj Mnotanez MD  [address] [place] [address]    RE: Irene León       Dear Colleague,     I had the pleasure of seeing Irene CARY Treekam in the [facility].  The patient has been notified of following:     \"This telephone visit will be conducted via a call between you and your physician/provider. We have found that certain health care needs can be provided without the need for a physical exam.  This service lets us provide the care you need with a phone conversation.  If a prescription is necessary we can send it directly to your pharmacy.  If lab work is needed we can place an order for that and you can then stop by our lab to have the test done at a later time. If during the course of the call the physician/provider feels a telephone visit is not appropriate, you will not be charged for this service.\" Verbal consent has been obtained for this service by care team member:         HEART CARE PHONE ENCOUNTER        The patient has chosen to have the visit conducted as a telephone visit, to reduce risk of exposure given the current status of Coronavirus in our community. This telephone visit is being conducted via a call between the patient and physician/provider. Health care needs are being provided without a physical exam.     Assessment/Recommendations   Assessment:    1.  Heart failure with reduced ejection fraction, ejection fraction 20 to 25%, NYHA class III: She states her dyspnea on exertion has improved with being on Bumex.  She states she does have lower extremity edema.  The TCU is monitoring her weights.  She denies orthopnea or PND.  She is interested in [facility] and I will send her an application to her home.  2.  CKD stage IV: She had labs drawn today at TCU.  Results are pending  3.  Severe COPD: She continues to wear oxygen at 3 L per nasal cannula she states.  She does bump up to 6 L to recover from activity.  4.  Hypertension: Controlled.  No blood " pressure today.  Yesterday's blood pressure was 121/69    Plan:  1.  Continue current medications  2.  Enroll in [facility] and will mail out application today  3.  Continue monitoring weights at TCU and follow low-salt diet  4.  Follow-up with palliative care      Follow Up Plan: Dr. Chilel December 19 and in the heart failure clinic in 3 to 4 weeks  I have reviewed the note as documented.  This accurately captures the substance of my conversation with the patient.    Total time of call between patient and provider was 10 minutes   Start Time:1040   Stop Time:1050    Provider at [facility] and patient at [facility]    30 minutes spent on the date of encounter doing chart review, review of outside records, review of test results, interpretation with above tests, patient visit, and documentation.           History of Present Illness/Subjective    Irene León is a 78 year old female who is being evaluated via a billable telephone visit.  She follows up for heart failure with reduced ejection fraction.  Her last echocardiogram showed ejection fraction of 20 to 25%.  She was recently hospitalized multiple times in the last month for acute COPD and heart failure.  She did require BiPAP this last admission.  Her Lasix was changed to Bumex.  Prior to discharge she developed acute onset of severe right-sided chest pain under the rib cage.  CT of the chest was negative for PE and showed aspiration pneumonia.  It showed right adrenal nodule hemorrhage which was most likely the cause of her pain.  This was suspected to be new metastasis.  Biopsy was deferred and patient resumed sotorasib for cancer treatment. Palliative care met with her and discussed possible hospice. She does see palliative in an outpatient basis. She also lost her  while hospitalized. She has a past medical history significant for hypertension, chronic kidney disease stage III, severe COPD on chronic oxygen, lung cancer and history of breast  cancer status post lumpectomy and radiation therapy. She was discharged to TCU.    Today, she states her dyspnea on exertion has improved.  She continues to have lower extremity edema.  She denies orthopnea, PND.  The TCU is monitoring her weights.      I have reviewed and updated the patient's Past Medical History, Social History, Family History and Medication List.     Physical Examination not performed given phone encounter Review of Systems                                                Medical History  Surgical History Family History Social History   Past Medical History:   Diagnosis Date    ANATOLIY (acute kidney injury) (H24)     Allergic rhinitis     Arrhythmia     Atrial fibrillation with RVR (H)     Bacteremia     Breast cancer (H) 2017    Cardiomyopathy (H)     Centrilobular emphysema (H)     CHF (congestive heart failure) (H)     Chronic kidney disease     CKD (chronic kidney disease)     COPD (chronic obstructive pulmonary disease) (H)     Coronary artery disease     Depression     Dysphagia     E. coli sepsis (H)     Factor 5 Leiden mutation, heterozygous (H24)     GERD (gastroesophageal reflux disease)     Heart failure with reduced ejection fraction (H) 04/17/2023    Hx of radiation therapy 2017    Hyperlipidemia     Hypertension     Hypokalemia     Hypomagnesemia     Idiopathic cardiomyopathy (H)     Left hip pain 04/30/2014    OAB (overactive bladder)     Osteoporosis     Sacral insufficiency fracture     Sinusitis     Syncope     Urge incontinence     Vocal cord dysfunction     Past Surgical History:   Procedure Laterality Date    ARTHROPLASTY REVISION HIP Left     BIOPSY BREAST Right 2017    BLADDER SURGERY      bladder interstim with removal    CARDIAC CATHETERIZATION      CATARACT EXTRACTION Left 07/18/2017    IR ABDOMINAL AORTOGRAM  4/16/2003    IR AORTIC ARCH 4 VESSEL ANGIOGRAM  4/16/2003    IR MISCELLANEOUS PROCEDURE  4/16/2003    LUMPECTOMY BREAST Left 06/2017    x2    PICC DOUBLE LUMEN  PLACEMENT  4/11/2022         PICC TRIPLE LUMEN PLACEMENT  4/7/2022         ZZC OPEN TX FEMORAL FRACTURE DISTAL MED/LAT CONDYLE Left 10/28/2015    Procedure: OPEN REDUCTION INTERNAL FIXATION LEFT  PROXIMAL FEMUR PERIPROSTHETIC FRACTURE;  Surgeon: Yovanny Albarran MD;  Location: [facility] Main OR;  Service: Orthopedics    Family History   Problem Relation Age of Onset    Osteoporosis Other     Prostate Cancer Brother     Breast Cancer Maternal Aunt         age thought to be in her 70's-80's    Prostate Cancer Maternal Uncle     Social History     Socioeconomic History    Marital status:      Spouse name: Not on file    Number of children: Not on file    Years of education: Not on file    Highest education level: Not on file   Occupational History    Not on file   Tobacco Use    Smoking status: Former     Types: Cigarettes     Quit date: 10/28/2007     Years since quitting: 15.9     Passive exposure: Past    Smokeless tobacco: Former     Quit date: 9/6/2004   Vaping Use    Vaping Use: Former   Substance and Sexual Activity    Alcohol use: No    Drug use: No    Sexual activity: Not on file   Other Topics Concern    Not on file   Social History Narrative     and lives in home with 3 flight of steps     Social Determinants of Health     Financial Resource Strain: Not on file   Food Insecurity: Not on file   Transportation Needs: Not on file   Physical Activity: Not on file   Stress: Not on file   Social Connections: Not on file   Interpersonal Safety: Not on file   Housing Stability: Not on file          Medications  Allergies   Current Outpatient Medications   Medication Sig Dispense Refill    acetaminophen (TYLENOL) 500 MG tablet Take 1,000 mg by mouth every 8 hours as needed for mild pain or fever       acetylcysteine (MUCOMYST) 10 % nebulizer solution Inhale 4 mLs into the lungs every 4 hours as needed for mucolysis/respiratory distress      albuterol (PROAIR HFA/PROVENTIL HFA/VENTOLIN HFA) 108 (90  Base) MCG/ACT inhaler Inhale 2 puffs into the lungs every 6 hours as needed for shortness of breath, wheezing or cough      Azelastine HCl 137 MCG/SPRAY SOLN Spray 1 spray into both nostrils 2 times daily as needed for rhinitis      benzonatate (TESSALON PERLES) 100 MG capsule Take 1 capsule (100 mg) by mouth 3 times daily as needed for cough 90 capsule 3    bumetanide (BUMEX) 1 MG tablet Take 1 tablet (1 mg) by mouth 2 times daily 60 tablet 0    cetirizine (ZYRTEC) 5 MG tablet Take 1 tablet (5 mg) by mouth daily as needed for allergies      chlorhexidine (PERIDEX) 0.12 % solution [CHLORHEXIDINE (PERIDEX) 0.12 % SOLUTION] Apply 15 mL to the mouth or throat at bedtime as needed.       escitalopram (LEXAPRO) 10 MG tablet Take 1 tablet (10 mg) by mouth daily 90 tablet 3    famotidine (PEPCID) 20 MG tablet Take 1 tablet (20 mg) by mouth every other day 90 tablet 0    fluticasone (FLONASE) 50 MCG/ACT nasal spray Spray 1 spray into both nostrils daily as needed for rhinitis or allergies      Fluticasone-Umeclidin-Vilanterol (TRELEGY ELLIPTA) 200-62.5-25 MCG/INH oral inhaler Inhale 1 puff into the lungs At Bedtime      gabapentin (NEURONTIN) 300 MG capsule Take 1 capsule (300 mg) by mouth at bedtime 30 capsule 0    ipratropium (ATROVENT HFA) 17 MCG/ACT inhaler Inhale 2 puffs into the lungs every 6 hours as needed for wheezing      ipratropium - albuterol 0.5 mg/2.5 mg/3 mL (DUONEB) 0.5-2.5 (3) MG/3ML neb solution Take 1 vial by nebulization every 6 hours as needed for shortness of breath, wheezing or cough      Melatonin 5 MG TBDP Take 15 mg by mouth At Bedtime      metoprolol succinate ER (TOPROL XL) 50 MG 24 hr tablet Take 1 tablet (50 mg) by mouth At Bedtime 30 tablet 0    mirabegron (MYRBETRIQ) 25 MG 24 hr tablet Take 1 tablet (25 mg) by mouth daily 30 tablet 0    nystatin (MYCOSTATIN) 346297 UNIT/GM external cream Apply topically 3 times daily as needed for dry skin      OLANZapine (ZYPREXA) 5 MG tablet Take 1 tablet  (5 mg) by mouth At Bedtime 30 tablet 3    predniSONE (DELTASONE) 10 MG tablet Take 1 tablet (10 mg) by mouth daily 5 tablet 0    rivaroxaban ANTICOAGULANT (XARELTO) 15 MG TABS tablet Take 1 tablet (15 mg) by mouth At Bedtime      simvastatin (ZOCOR) 40 MG tablet Take 1 tablet (40 mg) by mouth At Bedtime 90 tablet 3    sodium chloride 0.9 % neb solution Take 3 mLs by nebulization every 3 hours as needed for wheezing      sotorasib (LUMAKRAS) 120 MG tablet Take 6 tablets (720 mg) by mouth daily for 30 days Do not chew, crush or split tablets. 180 tablet 0    spironolactone (ALDACTONE) 25 MG tablet Take 1 tablet (25 mg) by mouth daily 30 tablet 0    tamoxifen (NOLVADEX) 20 MG tablet Take 1 tablet (20 mg) by mouth daily 90 tablet 3    traMADol (ULTRAM) 50 MG tablet Take 1 tablet (50 mg) by mouth 2 times daily as needed for severe pain 60 tablet 0    zinc oxide (DESITIN) 40 % external ointment Apply topically as needed for dry skin or irritation (Twice a day to gluteal area skin irritation) 113 g 1    prochlorperazine (COMPAZINE) 10 MG tablet Take 1 tablet (10 mg) by mouth every 6 hours as needed (Nausea/Vomiting) (Patient not taking: Reported on 10/10/2023) 30 tablet 5    Allergies   Allergen Reactions    Sulfa (Sulfonamide Antibiotics) [Sulfa Antibiotics] Rash     Headaches and dizziness.    Homeopathic Drugs [External Allergen Needs Reconciliation - See Comment] Unknown     runny nose    Mold Extracts [Molds & Smuts] Unknown    Morphine (Pf) [Morphine] Unknown     hallucinate    Lisinopril Itching, Cough and Unknown     cough    Sulfacetamide Sodium [Sulfacetamide] Rash         Lab Results    Chemistry/lipid CBC Cardiac Enzymes/BNP/TSH/INR   Lab Results   Component Value Date    BUN 24.3 (H) 10/05/2023     10/05/2023    CO2 27 10/05/2023    Lab Results   Component Value Date    WBC 15.7 (H) 10/05/2023    HGB 10.1 (L) 10/05/2023    HCT 32.2 (L) 10/05/2023    MCV 91 10/05/2023     10/05/2023    Lab Results    Component Value Date    TROPONINI 0.07 08/27/2023     (H) 08/27/2023    INR 1.05 08/27/2023        DELMIS Watson CNP      Thank you for allowing me to participate in the care of your patient.      Sincerely,     DELMIS Watson CNP     [facility]  cc:   DELMIS Watson CNP  [address], [address]  [place] [phone]

## 2023-10-10 NOTE — TELEPHONE ENCOUNTER
Return call received from [facility]. They are wondering if appointment with Dr. Suarez on Thursday could be a virtual appointment. Any lab work needed could be drawn at the care facility.     Message will be sent to Dr. Suarez to advise.    Bianca Coates RN

## 2023-10-10 NOTE — PROGRESS NOTES
"The patient has been notified of following:     \"This telephone visit will be conducted via a call between you and your physician/provider. We have found that certain health care needs can be provided without the need for a physical exam.  This service lets us provide the care you need with a phone conversation.  If a prescription is necessary we can send it directly to your pharmacy.  If lab work is needed we can place an order for that and you can then stop by our lab to have the test done at a later time. If during the course of the call the physician/provider feels a telephone visit is not appropriate, you will not be charged for this service.\" Verbal consent has been obtained for this service by care team member:         HEART CARE PHONE ENCOUNTER        The patient has chosen to have the visit conducted as a telephone visit, to reduce risk of exposure given the current status of Coronavirus in our community. This telephone visit is being conducted via a call between the patient and physician/provider. Health care needs are being provided without a physical exam.     Assessment/Recommendations   Assessment:    1.  Heart failure with reduced ejection fraction, ejection fraction 20 to 25%, NYHA class III: She states her dyspnea on exertion has improved with being on Bumex.  She states she does have lower extremity edema.  The TCU is monitoring her weights.  She denies orthopnea or PND.  She is interested in [facility] and I will send her an application to her home.  2.  CKD stage IV: She had labs drawn today at TCU.  Results are pending  3.  Severe COPD: She continues to wear oxygen at 3 L per nasal cannula she states.  She does bump up to 6 L to recover from activity.  4.  Hypertension: Controlled.  No blood pressure today.  Yesterday's blood pressure was 121/69    Plan:  1.  Continue current medications  2.  Enroll in [facility] and will mail out application today  3.  Continue monitoring weights at TCU and " follow low-salt diet  4.  Follow-up with palliative care      Follow Up Plan: Dr. Chilel December 19 and in the heart failure clinic in 3 to 4 weeks  I have reviewed the note as documented.  This accurately captures the substance of my conversation with the patient.    Total time of call between patient and provider was 10 minutes   Start Time:1040   Stop Time:1050    Provider at [facility] and patient at [facility]    30 minutes spent on the date of encounter doing chart review, review of outside records, review of test results, interpretation with above tests, patient visit, and documentation.           History of Present Illness/Subjective    Irene León is a 78 year old female who is being evaluated via a billable telephone visit.  She follows up for heart failure with reduced ejection fraction.  Her last echocardiogram showed ejection fraction of 20 to 25%.  She was recently hospitalized multiple times in the last month for acute COPD and heart failure.  She did require BiPAP this last admission.  Her Lasix was changed to Bumex.  Prior to discharge she developed acute onset of severe right-sided chest pain under the rib cage.  CT of the chest was negative for PE and showed aspiration pneumonia.  It showed right adrenal nodule hemorrhage which was most likely the cause of her pain.  This was suspected to be new metastasis.  Biopsy was deferred and patient resumed sotorasib for cancer treatment. Palliative care met with her and discussed possible hospice. She does see palliative in an outpatient basis. She also lost her  while hospitalized. She has a past medical history significant for hypertension, chronic kidney disease stage III, severe COPD on chronic oxygen, lung cancer and history of breast cancer status post lumpectomy and radiation therapy. [facility] was [facility].    Today, she states her dyspnea on exertion has improved.  She continues to have lower extremity edema.  She denies  orthopnea, PND.  The TCU is monitoring her weights.      I have reviewed and updated the patient's Past Medical History, Social History, Family History and Medication List.     Physical Examination not performed given phone encounter Review of Systems                                                Medical History  Surgical History Family History Social History   Past Medical History:   Diagnosis Date    ANATOLIY (acute kidney injury) (H24)     Allergic rhinitis     Arrhythmia     Atrial fibrillation with RVR (H)     Bacteremia     Breast cancer (H) 2017    Cardiomyopathy (H)     Centrilobular emphysema (H)     CHF (congestive heart failure) (H)     Chronic kidney disease     CKD (chronic kidney disease)     COPD (chronic obstructive pulmonary disease) (H)     Coronary artery disease     Depression     Dysphagia     E. coli sepsis (H)     Factor 5 Leiden mutation, heterozygous (H24)     GERD (gastroesophageal reflux disease)     Heart failure with reduced ejection fraction (H) 04/17/2023    Hx of radiation therapy 2017    Hyperlipidemia     Hypertension     Hypokalemia     Hypomagnesemia     Idiopathic cardiomyopathy (H)     Left hip pain 04/30/2014    OAB (overactive bladder)     Osteoporosis     Sacral insufficiency fracture     Sinusitis     Syncope     Urge incontinence     Vocal cord dysfunction     Past Surgical History:   Procedure Laterality Date    ARTHROPLASTY REVISION HIP Left     BIOPSY BREAST Right 2017    BLADDER SURGERY      bladder interstim with removal    CARDIAC CATHETERIZATION      CATARACT EXTRACTION Left 07/18/2017    IR ABDOMINAL AORTOGRAM  4/16/2003    IR AORTIC ARCH 4 VESSEL ANGIOGRAM  4/16/2003    IR MISCELLANEOUS PROCEDURE  4/16/2003    LUMPECTOMY BREAST Left 06/2017    x2    PICC DOUBLE LUMEN PLACEMENT  4/11/2022         PICC TRIPLE LUMEN PLACEMENT  4/7/2022         ZZC OPEN TX FEMORAL FRACTURE DISTAL MED/LAT CONDYLE Left 10/28/2015    Procedure: OPEN REDUCTION INTERNAL FIXATION LEFT  PROXIMAL  FEMUR PERIPROSTHETIC FRACTURE;  Surgeon: Yovanny Albarran MD;  Location: [facility] Main OR;  Service: Orthopedics    Family History   Problem Relation Age of Onset    Osteoporosis Other     Prostate Cancer Brother     Breast Cancer Maternal Aunt         age thought to be in her 70's-80's    Prostate Cancer Maternal Uncle     Social History     Socioeconomic History    Marital status:      Spouse name: Not on file    Number of children: Not on file    Years of education: Not on file    Highest education level: Not on file   Occupational History    Not on file   Tobacco Use    Smoking status: Former     Types: Cigarettes     Quit date: 10/28/2007     Years since quitting: 15.9     Passive exposure: Past    Smokeless tobacco: Former     Quit date: 9/6/2004   Vaping Use    Vaping Use: Former   Substance and Sexual Activity    Alcohol use: No    Drug use: No    Sexual activity: Not on file   Other Topics Concern    Not on file   Social History Narrative     and lives in home with 3 flight of steps     Social Determinants of Health     Financial Resource Strain: Not on file   Food Insecurity: Not on file   Transportation Needs: Not on file   Physical Activity: Not on file   Stress: Not on file   Social Connections: Not on file   Interpersonal Safety: Not on file   Housing Stability: Not on file          Medications  Allergies   Current Outpatient Medications   Medication Sig Dispense Refill    acetaminophen (TYLENOL) 500 MG tablet Take 1,000 mg by mouth every 8 hours as needed for mild pain or fever       acetylcysteine (MUCOMYST) 10 % nebulizer solution Inhale 4 mLs into the lungs every 4 hours as needed for mucolysis/respiratory distress      albuterol (PROAIR HFA/PROVENTIL HFA/VENTOLIN HFA) 108 (90 Base) MCG/ACT inhaler Inhale 2 puffs into the lungs every 6 hours as needed for shortness of breath, wheezing or cough      Azelastine HCl 137 MCG/SPRAY SOLN Spray 1 spray into both nostrils 2 times daily as  needed for rhinitis      benzonatate (TESSALON PERLES) 100 MG capsule Take 1 capsule (100 mg) by mouth 3 times daily as needed for cough 90 capsule 3    bumetanide (BUMEX) 1 MG tablet Take 1 tablet (1 mg) by mouth 2 times daily 60 tablet 0    cetirizine (ZYRTEC) 5 MG tablet Take 1 tablet (5 mg) by mouth daily as needed for allergies      chlorhexidine (PERIDEX) 0.12 % solution [CHLORHEXIDINE (PERIDEX) 0.12 % SOLUTION] Apply 15 mL to the mouth or throat at bedtime as needed.       escitalopram (LEXAPRO) 10 MG tablet Take 1 tablet (10 mg) by mouth daily 90 tablet 3    famotidine (PEPCID) 20 MG tablet Take 1 tablet (20 mg) by mouth every other day 90 tablet 0    fluticasone (FLONASE) 50 MCG/ACT nasal spray Spray 1 spray into both nostrils daily as needed for rhinitis or allergies      Fluticasone-Umeclidin-Vilanterol (TRELEGY ELLIPTA) 200-62.5-25 MCG/INH oral inhaler Inhale 1 puff into the lungs At Bedtime      gabapentin (NEURONTIN) 300 MG capsule Take 1 capsule (300 mg) by mouth at bedtime 30 capsule 0    ipratropium (ATROVENT HFA) 17 MCG/ACT inhaler Inhale 2 puffs into the lungs every 6 hours as needed for wheezing      ipratropium - albuterol 0.5 mg/2.5 mg/3 mL (DUONEB) 0.5-2.5 (3) MG/3ML neb solution Take 1 vial by nebulization every 6 hours as needed for shortness of breath, wheezing or cough      Melatonin 5 MG TBDP Take 15 mg by mouth At Bedtime      metoprolol succinate ER (TOPROL XL) 50 MG 24 hr tablet Take 1 tablet (50 mg) by mouth At Bedtime 30 tablet 0    mirabegron (MYRBETRIQ) 25 MG 24 hr tablet Take 1 tablet (25 mg) by mouth daily 30 tablet 0    nystatin (MYCOSTATIN) 337623 UNIT/GM external cream Apply topically 3 times daily as needed for dry skin      OLANZapine (ZYPREXA) 5 MG tablet Take 1 tablet (5 mg) by mouth At Bedtime 30 tablet 3    predniSONE (DELTASONE) 10 MG tablet Take 1 tablet (10 mg) by mouth daily 5 tablet 0    rivaroxaban ANTICOAGULANT (XARELTO) 15 MG TABS tablet Take 1 tablet (15 mg) by  mouth At Bedtime      simvastatin (ZOCOR) 40 MG tablet Take 1 tablet (40 mg) by mouth At Bedtime 90 tablet 3    sodium chloride 0.9 % neb solution Take 3 mLs by nebulization every 3 hours as needed for wheezing      sotorasib (LUMAKRAS) 120 MG tablet Take 6 tablets (720 mg) by mouth daily for 30 days Do not chew, crush or split tablets. 180 tablet 0    spironolactone (ALDACTONE) 25 MG tablet Take 1 tablet (25 mg) by mouth daily 30 tablet 0    tamoxifen (NOLVADEX) 20 MG tablet Take 1 tablet (20 mg) by mouth daily 90 tablet 3    traMADol (ULTRAM) 50 MG tablet Take 1 tablet (50 mg) by mouth 2 times daily as needed for severe pain 60 tablet 0    zinc oxide (DESITIN) 40 % external ointment Apply topically as needed for dry skin or irritation (Twice a day to gluteal area skin irritation) 113 g 1    prochlorperazine (COMPAZINE) 10 MG tablet Take 1 tablet (10 mg) by mouth every 6 hours as needed (Nausea/Vomiting) (Patient not taking: Reported on 10/10/2023) 30 tablet 5    Allergies   Allergen Reactions    Sulfa (Sulfonamide Antibiotics) [Sulfa Antibiotics] Rash     Headaches and dizziness.    Homeopathic Drugs [External Allergen Needs Reconciliation - See Comment] Unknown     runny nose    Mold Extracts [Molds & Smuts] Unknown    Morphine (Pf) [Morphine] Unknown     hallucinate    Lisinopril Itching, Cough and Unknown     cough    Sulfacetamide Sodium [Sulfacetamide] Rash         Lab Results    Chemistry/lipid CBC Cardiac Enzymes/BNP/TSH/INR   Lab Results   Component Value Date    BUN 24.3 (H) 10/05/2023     10/05/2023    CO2 27 10/05/2023    Lab Results   Component Value Date    WBC 15.7 (H) 10/05/2023    HGB 10.1 (L) 10/05/2023    HCT 32.2 (L) 10/05/2023    MCV 91 10/05/2023     10/05/2023    Lab Results   Component Value Date    TROPONINI 0.07 08/27/2023     (H) 08/27/2023    INR 1.05 08/27/2023        Samantha Herrera, APRN CNP

## 2023-10-10 NOTE — TELEPHONE ENCOUNTER
[facility] Lab Note     Provider: GERA Galeana  Facility: [facility]  Facility Type:  TCU    Allergies   Allergen Reactions    Sulfa (Sulfonamide Antibiotics) [Sulfa Antibiotics] Rash     Headaches and dizziness.    Homeopathic Drugs [External Allergen Needs Reconciliation - See Comment] Unknown     runny nose    Mold Extracts [Molds & Smuts] Unknown    Morphine (Pf) [Morphine] Unknown     hallucinate    Lisinopril Itching, Cough and Unknown     cough    Sulfacetamide Sodium [Sulfacetamide] Rash       Labs Reviewed by provider: Heme 2, BMP, Mg, hepatic     Verbal Order/Direction given by Provider: No new orders.      Provider giving Order:  GERA Galeana    Verbal Order given to: Magda([phone])    Biju Gabriel RN

## 2023-10-12 PROBLEM — E87.20 ACIDOSIS: Status: ACTIVE | Noted: 2021-03-30

## 2023-10-12 NOTE — NURSING NOTE
Is the patient currently in the state of [place]? YES    Visit mode:VIDEO    If the visit is dropped, the patient can be reconnected by: VIDEO VISIT: Text to cell phone:   Telephone Information:   Mobile [phone]       Will anyone else be joining the visit? NO  (If patient encounters technical issues they should call [phone]    How would you like to obtain your AVS? Mail a copy    Are changes needed to the allergy or medication list? No    Reason for visit: RECHECK    Pt declined distress screening.     Melody ABBOTTF

## 2023-10-12 NOTE — PROGRESS NOTES
[facility]: Cancer Care                                                                                          [facility]:  Phone [phone]  Brigida not available. Person that answered phone and said they were waiting for a fax but didn't know what needed to be faxed.    Called patient. She said med orders need to be faxed to [facility].   She confirmed that medication is delivered to her daughter, Leelee. Leelee brings it to her and nursing staff dispense the med to her.    Faxed sotorasib orders:  Take 8 tablets (960 mg) by mouth daily. Start the evening of 10/12/23.  Faxed to [facility]:  [phone] @ [phone]. Right fax confirmed sent.    Update: 10/13/23: Faxed CBC with plt with diff and CMP orders to [facility] [phone] at [phone]. Requested labs be drawn on 10/24 or 10/25 so Dr. Suarez has results in time for appointment on 10/26.   Right Fax confirm sent.    Signature:  Erin Nogueira RN

## 2023-10-12 NOTE — LETTER
10/12/2023        RE: Irene eLón  [address]  [facility] [address]                                                                                      [facility]     Code Status:  DNR/DNI  Visit Type: RECHECK     Facility:   [facility] ([facility]) [[id number]]        History of Present Illness:   Hospital Admission Date: 9/23/2023     Hospital Discharge Date: 10/5/2023      Irene León is a 78 year old female with past medical history for HFrEF, chronic O2, COPD,hx of Lung cancer on oral chemo, CKD, Factor 5, HLD,  hx of TIA, depression and anxiety. She was recently hospitalized for acute hypoxic respiratory failure 2/2 COPD and CHF.  CXR also showed aspiration pneumonitis and pneumonia. She was treated with IV ceftriaxone and oral doxycycline and prednisone with improved respiratory status.  Cardiology changed her lasix to Bumex with good resoluation of her edema.      FYI; patient's  and primary caregiver passed away in the ICU during this hospitalization.      She had right sided chest pain and workup showed CT with new right adrenal mass with hemorrhage with concern for metastasis.  She was seen by Hemonc and okayed to resume xarelto and patient chose to resume sotorasib. Surgery recommended conservative management and consider dcing xarelto if she has continued bleeding.     Today, patient reports her breathing is not back to baseline.  She is on 3L of O2 at rest.  She reports she needs longer recovery than she typically does.  Nursing reports she desats with standing, into the 80s.  SW discussed hospice options with patient and family yesterday and they weren't receptive.       Current Outpatient Medications   Medication Sig Dispense Refill     acetaminophen (TYLENOL) 500 MG tablet Take 1,000 mg by mouth every 8 hours as needed for mild pain or fever        acetylcysteine (MUCOMYST) 10 % nebulizer solution Inhale 4 mLs into the lungs every 4 hours as needed for  mucolysis/respiratory distress       albuterol (PROAIR HFA/PROVENTIL HFA/VENTOLIN HFA) 108 (90 Base) MCG/ACT inhaler Inhale 2 puffs into the lungs every 6 hours as needed for shortness of breath, wheezing or cough       Azelastine HCl 137 MCG/SPRAY SOLN Spray 1 spray into both nostrils 2 times daily as needed for rhinitis       benzonatate (TESSALON PERLES) 100 MG capsule Take 1 capsule (100 mg) by mouth 3 times daily as needed for cough 90 capsule 3     bumetanide (BUMEX) 1 MG tablet Take 1 tablet (1 mg) by mouth 2 times daily 60 tablet 0     cetirizine (ZYRTEC) 5 MG tablet Take 1 tablet (5 mg) by mouth daily as needed for allergies       chlorhexidine (PERIDEX) 0.12 % solution [CHLORHEXIDINE (PERIDEX) 0.12 % SOLUTION] Apply 15 mL to the mouth or throat at bedtime as needed.        escitalopram (LEXAPRO) 10 MG tablet Take 1 tablet (10 mg) by mouth daily 90 tablet 3     famotidine (PEPCID) 20 MG tablet Take 1 tablet (20 mg) by mouth every other day 90 tablet 0     fluticasone (FLONASE) 50 MCG/ACT nasal spray Spray 1 spray into both nostrils daily as needed for rhinitis or allergies       Fluticasone-Umeclidin-Vilanterol (TRELEGY ELLIPTA) 200-62.5-25 MCG/INH oral inhaler Inhale 1 puff into the lungs At Bedtime       gabapentin (NEURONTIN) 300 MG capsule Take 1 capsule (300 mg) by mouth at bedtime 30 capsule 0     ipratropium (ATROVENT HFA) 17 MCG/ACT inhaler Inhale 2 puffs into the lungs every 6 hours as needed for wheezing       ipratropium - albuterol 0.5 mg/2.5 mg/3 mL (DUONEB) 0.5-2.5 (3) MG/3ML neb solution Take 1 vial by nebulization every 6 hours as needed for shortness of breath, wheezing or cough       Melatonin 5 MG TBDP Take 15 mg by mouth At Bedtime       metoprolol succinate ER (TOPROL XL) 50 MG 24 hr tablet Take 1 tablet (50 mg) by mouth At Bedtime 30 tablet 0     mirabegron (MYRBETRIQ) 25 MG 24 hr tablet Take 1 tablet (25 mg) by mouth daily 30 tablet 0     nystatin (MYCOSTATIN) 208683 UNIT/GM external  cream Apply topically 3 times daily as needed for dry skin       OLANZapine (ZYPREXA) 5 MG tablet Take 1 tablet (5 mg) by mouth At Bedtime 30 tablet 3     prochlorperazine (COMPAZINE) 10 MG tablet Take 1 tablet (10 mg) by mouth every 6 hours as needed (Nausea/Vomiting) 30 tablet 5     rivaroxaban ANTICOAGULANT (XARELTO) 15 MG TABS tablet Take 1 tablet (15 mg) by mouth At Bedtime       simvastatin (ZOCOR) 40 MG tablet Take 1 tablet (40 mg) by mouth At Bedtime 90 tablet 3     sodium chloride 0.9 % neb solution Take 3 mLs by nebulization every 3 hours as needed for wheezing       sotorasib (LUMAKRAS) 120 MG tablet Take 6 tablets (720 mg) by mouth daily for 30 days Do not chew, crush or split tablets. 180 tablet 0     spironolactone (ALDACTONE) 25 MG tablet Take 1 tablet (25 mg) by mouth daily 30 tablet 0     tamoxifen (NOLVADEX) 20 MG tablet Take 1 tablet (20 mg) by mouth daily 90 tablet 3     traMADol (ULTRAM) 50 MG tablet Take 1 tablet (50 mg) by mouth 2 times daily as needed for severe pain 60 tablet 0     zinc oxide (DESITIN) 40 % external ointment Apply topically as needed for dry skin or irritation (Twice a day to gluteal area skin irritation) 113 g 1     Allergies   Allergen Reactions     Sulfa (Sulfonamide Antibiotics) [Sulfa Antibiotics] Rash     Headaches and dizziness.     Homeopathic Drugs [External Allergen Needs Reconciliation - See Comment] Unknown     runny nose     Mold Extracts [Molds & Smuts] Unknown     Morphine (Pf) [Morphine] Unknown     hallucinate     Lisinopril Itching, Cough and Unknown     cough     Sulfacetamide Sodium [Sulfacetamide] Rash     Immunization History   Administered Date(s) Administered     COVID-19 Bivalent 12+ (Pfizer) 03/02/2021, 12/08/2021, 11/16/2022     COVID-19 MONOVALENT 12+ (Pfizer) 03/02/2021, 03/23/2021, 12/08/2021     COVID-19 Monovalent 18+ (Moderna) 03/02/2021     FLU 6-35 months 11/03/2003     Flu 65+ Years 09/20/2017, 09/25/2018, 10/10/2019     Flu, Unspecified  "10/01/2016, 10/01/2018     K1g5-60 Novel Flu 01/05/2010     Influenza (H1N1) 01/05/2010     Influenza (High Dose) 3 valent vaccine 10/30/2014, 10/01/2015, 11/26/2016, 09/25/2018, 10/10/2019, 10/19/2020, 11/16/2022     Influenza (IIV3) PF 11/03/2003, 11/08/2006, 10/24/2007, 11/28/2008, 10/09/2009, 10/22/2010, 01/26/2012, 10/03/2012, 11/01/2013     Influenza Vaccine 65+ (Fluzone HD) 09/20/2017, 10/19/2020, 12/15/2020, 11/16/2022     Mantoux Tuberculin Skin Test 12/13/2007, 10/24/2020, 11/03/2020     Pneumo Conj 13-V (2010&after) 05/11/2015     Pneumococcal 23 valent 11/24/1997, 11/21/2007, 05/17/2017     TD,PF 7+ (Tenivac) 07/20/2004     TDAP (Adacel,Boostrix) 02/12/2016     TDAP Vaccine (Boostrix) 02/12/2016     Zoster recombinant adjuvanted (SHINGRIX) 07/31/2020     Zoster vaccine, live 02/18/2009, 10/20/2014, 07/31/2020         Review of Systems   Patient denies fever, chills, headache, lightheadedness, dizziness, rhinorrhea, cough, congestion,  palpitations, abdominal pain, n/v, diarrhea, constipation, change in appetite, change in sleep pattern, dysuria, frequency, burning or pain with urination.  Other than stated in HPI all other review of systems is negative.       Physical Exam  Vital signs:/72   Pulse 75   Temp 97.9  F (36.6  C)   Resp 18   Ht 1.575 m (5' 2\")   Wt 50.3 kg (111 lb)   SpO2 98%   BMI 20.30 kg/m     GENERAL APPEARANCE: thin, frail elderly female,  in no acute distress.  HEENT: normocephalic, atraumatic  sclerae anicteric, conjunctivae clear and moist, EOM intact  LUNGS: expiratory rhonchi (honk) BLL, respiratory effort normal. O2 3L   CARD: RRR, S1, S2, without murmurs, gallops, rubs  ABD: Soft, nondistended and nontender with normal bowel sounds.   MSK: Muscle strength and tone were equal bilaterally. Moves all extremities easily and intentionally.   EXTREMITIES: No cyanosis, clubbing or edema.  NEURO: Alert and oriented x 3. Face is symmetric.  SKIN: hemosiderin staining of shins " and purpura and scabbing.   PSYCH: euthymic        Labs:    Last Comprehensive Metabolic Panel:  Lab Results   Component Value Date     10/10/2023    POTASSIUM 4.6 10/10/2023    CHLORIDE 104 10/10/2023    CO2 25 10/10/2023    ANIONGAP 12 10/10/2023    GLC 70 10/10/2023    BUN 27.0 (H) 10/10/2023    CR 1.37 (H) 10/10/2023    GFRESTIMATED 39 (L) 10/10/2023    MESSI 8.7 (L) 10/10/2023       Lab Results   Component Value Date    WBC 15.7 10/05/2023     Lab Results   Component Value Date    RBC 3.54 10/05/2023     Lab Results   Component Value Date    HGB 10.1 10/05/2023     Lab Results   Component Value Date    HCT 32.2 10/05/2023     Lab Results   Component Value Date    MCV 91 10/05/2023     Lab Results   Component Value Date    MCH 28.5 10/05/2023     Lab Results   Component Value Date    MCHC 31.4 10/05/2023     Lab Results   Component Value Date    RDW 15.4 10/05/2023     Lab Results   Component Value Date     10/05/2023           Assessment/plan:   Acute and chronic respiratory failure with hypoxia (H)  Pulmonary emphysema, unspecified emphysema type (H)  COPD exacerbation (H)  Symptoms improving, no longer on Augmentin or prednisone.  Continue with inhalers.  Question her discharge plan due to having no care giver.    suddenly last week.     Acute on chronic diastolic congestive heart failure (H)  Compensated, continue with spironolactone and Bumex.     Malignant neoplasm of lung, unspecified laterality, unspecified part of lung (H)  Adrenal mass (H24)  Discussed goals of care.  Patient sees palliative care for symptom management.  She does not want to discuss hospice at this time.  She has an oncology visit scheduled today.  Advised patient to continue to discuss goals of care. Family supplying home sotorasib    Pneumonia due to infectious organism, unspecified laterality, unspecified part of lung  Symptoms improving, counseled on s/s of aspiration.    Augmentin and prednisone completed        Factor 5 Leiden mutation, heterozygous (H24)  Noted, continue with home Xarelto.     Gastroesophageal reflux disease with esophagitis without hemorrhage  Continue with TUMS and famotidine.         Electronically signed by: Rayna Carbajal NP      Sincerely,        Rayna Carbajal NP

## 2023-10-12 NOTE — PROGRESS NOTES
Virtual Visit Details    Type of service:  Video Visit   Video Start Time: 9:50 AM  Video End Time:10:03 AM    Originating Location (pt. Location): TCU    Distant Location (provider location):  On-site  Platform used for Video Visit: [facility] Hematology and Oncology Progress Note    Patient: Irene León  MRN: [id number]  Date of Service: Oct 12, 2023       Reason for Visit    Chief Complaint   Patient presents with    RECHECK       Assessment and Plan     Cancer Staging   Invasive ductal carcinoma of breast, female, left (H)  Staging form: Breast, AJCC 8th Edition  - Pathologic stage from 6/20/2017: Stage IA (pT1b, pN0(sn), cM0, G1, ER+, WY+, HER2-, Oncotype DX score: 6) - Signed by Devon Suarez MD on 7/4/2022      ECOG Performance    3 - Confined to bed/chair > 50% of time, capable of limited self care     Pain  Pain Score: No Pain (0)      #.   Right upper lobe pulmonary lung cancer with adjacent satellite nodule and thoracic lymph node metastasis most consistent with locally advanced lung cancer.  There is no evidence of distant metastasis. KRAS G12C mutated.  #.  New bilateral adrenal nodules concerning for metastatic lesions    #.  History of invasive ductal carcinoma of the left breast.   Stage IA (pT1b, pN0 (sn),cM0). grade 1, associated DCIS, ER/WY positive, HER-2 negative, s/p left breast lumpectomy and left axillary sentinel lymph node biopsy. Right breast atypical ductal hyperplasia s/p right breast excisional biopsy on 6/20/2017.  She has several comorbidities including nonischemic cardiomyopathy (EF 40% in 3/17), osteoporosis on treatment with oral bisphosphonate, factor V Leiden mutation (details unknown). Started Tamoxifen in 12/2017.  #.  Severe COPD and chronic hypoxic respiratory failure, on supplemental oxygen- On 3 L at rest, 5-6 L with exertion.  #.  CHF with reduced EF of 20-25%  #.  CKD - 3b     She looks clinically stable.  I reviewed her labs from 2 days ago and it  showed stable CBC and CMP.  She tolerates sotorasib 720 mg (6 tablets) fairly well for the last 2 weeks.  Since she has no intolerable side effects, we discussed about increasing dose of sotorasib to 750 mg daily.  She agreed to try.  New prescription sent.       Plan:  -Increase sotorasib to 960 mg daily.   -Information on change of medication medication was sent to her TCU.  -Follow-up labs and provider visit (virtual okay) in 2 weeks to assess tolerability/toxicity of sotorasib.  -We will plan to obtain CT scan in about 6 weeks for follow-up on bilateral adrenal nodules.  It was previously attempted for biopsy, however canceled since right adrenal nodule may not yield diagnostic result due to a mix of hematoma and solid tumor and left adrenal nodule position was not assessable for biopsy.  Therefore, we elected to monitor.  -Continue PT/OT.  -She is advised to call me sooner with any concerns.    Encounter Diagnoses:    Problem List Items Addressed This Visit          Oncology Diagnoses    Malignant neoplasm of upper lobe of right lung (H) - Primary    Relevant Orders    CBC with Platelets & Differential    Comprehensive metabolic panel     Other Visit Diagnoses       Encounter for antineoplastic chemotherapy        Relevant Orders    CBC with Platelets & Differential    Comprehensive metabolic panel                CC: Pankaj Montanez MD   ______________________________________________________________________________  Diagnosis  6/20/17  Stage IA (pT1b, pN0(sn), cM0) invasive ductal carcinoma of the left breast  - ER (high positive, 98%), MD (high positive, 97%) HER2 (negative, score of 1+ by IHC)  - [place] grade 1  - Tumor size: <1 cm  - Margin-negative on final margin with reexcision for invasive carcinoma but close margin for DCIS of 0.2 cm from new medial margin.    - 1 sentinel lymph node removed, negative for carcinoma  - associated DCIS  - Right breast atypical ductal hyperplasia.    - Oncotype DX  recurrence score 6 : 10 year risk of recurrence with 5 year of tamoxifen is 5%.    Therapy to date:  6/20/17 -right breast excisional biopsy AND left breast lumpectomy, left axillary sentinel lymph node biopsy  6/30/17-reexcision lumpectomy of the left breast  9/8/17-completed adjuvant radiation to the left breast 4256 cGy/16  12/6/17-8/11/2023- adjuvant endocrine therapy with tamoxifen.    8/2023-right upper lobe lung cancer with adjacent satellite nodules and thoracic lymph node metastasis   It was diagnosed after a progressively enlarging right upper lobe lung mass with satellite nodules.  Due to her severe COPD/emphysema, chronic hypoxic respiratory failure and increased risk of complications, lung or mediastinal lymph node biopsy was unable to obtain.  They are no other alternative site for biopsy.    Radiation is absolutely contraindicated with her pulmonary condition.  Her current performance status is not adequate for chemotherapy either.  She has strong desire to look into potential treatment option with the goal of palliation including prolonging life expectancy. [id number] test was completed.  It showed KRAS G12C, TP53 mutations.     9/1/2023-started sotorasib 960 mg daily.  Held on 9/22/2023 due to confusion/delirium, hospitalization. Restart at 720 mg on 10/2/2023 and then increase back to 960 mg daily    Comorbidities  #.  Mild hypoxia and chronic cough.   She is closely follow with pulmonologist at [facility].    #. Hypertension, controlled.  #.  Osteoporosis  #.  COPD  #.  Idiopathic cardiomyopathy- LVEF 40% in October 2018, no change from prior.      History of Present Illness    Ms. Irene León presented today accompanied by her daughter, Leelee.  She is currently in TCU.  She reported she was getting better.  She does physical therapy and Occupational Therapy.  She will be in TCU for another 2 weeks or so.  She does not recall any side effects from sotorasib 6 tablets.    Review of systems  Apart  "from describing in HPI, the remainder of comprehensive ROS was negative.    Past History    Past Medical History:   Diagnosis Date    ANATOLIY (acute kidney injury) (H24)     Allergic rhinitis     Arrhythmia     Atrial fibrillation with RVR (H)     Bacteremia     Breast cancer (H) 2017    Cardiomyopathy (H)     Centrilobular emphysema (H)     CHF (congestive heart failure) (H)     Chronic kidney disease     CKD (chronic kidney disease)     COPD (chronic obstructive pulmonary disease) (H)     Coronary artery disease     Depression     Dysphagia     E. coli sepsis (H)     Factor 5 Leiden mutation, heterozygous (H24)     GERD (gastroesophageal reflux disease)     Heart failure with reduced ejection fraction (H) 04/17/2023    Hx of radiation therapy 2017    Hyperlipidemia     Hypertension     Hypokalemia     Hypomagnesemia     Idiopathic cardiomyopathy (H)     Left hip pain 04/30/2014    OAB (overactive bladder)     Osteoporosis     Sacral insufficiency fracture     Sinusitis     Syncope     Urge incontinence     Vocal cord dysfunction        Past Surgical History:   Procedure Laterality Date    ARTHROPLASTY REVISION HIP Left     BIOPSY BREAST Right 2017    BLADDER SURGERY      bladder interstim with removal    CARDIAC CATHETERIZATION      CATARACT EXTRACTION Left 07/18/2017    IR ABDOMINAL AORTOGRAM  4/16/2003    IR AORTIC ARCH 4 VESSEL ANGIOGRAM  4/16/2003    IR MISCELLANEOUS PROCEDURE  4/16/2003    LUMPECTOMY BREAST Left 06/2017    x2    PICC DOUBLE LUMEN PLACEMENT  4/11/2022         PICC TRIPLE LUMEN PLACEMENT  4/7/2022         ZZC OPEN TX FEMORAL FRACTURE DISTAL MED/LAT CONDYLE Left 10/28/2015    Procedure: OPEN REDUCTION INTERNAL FIXATION LEFT  PROXIMAL FEMUR PERIPROSTHETIC FRACTURE;  Surgeon: Yovanny Albarran MD;  Location: [facility];  Service: Orthopedics       Physical Exam    Ht 1.499 m (4' 11\")   Wt 50.3 kg (111 lb)   BMI 22.42 kg/m      General: alert, awake, not in acute distress however, she looks " very tired.  Frail.  She wears 3 L oxygen by nasal cannula.  CNS: non focal.  Oriented x3.     Lab Results    Recent Results (from the past 168 hour(s))   Basic metabolic panel   Result Value Ref Range    Sodium 141 135 - 145 mmol/L    Potassium 4.6 3.4 - 5.3 mmol/L    Chloride 104 98 - 107 mmol/L    Carbon Dioxide (CO2) 25 22 - 29 mmol/L    Anion Gap 12 7 - 15 mmol/L    Urea Nitrogen 27.0 (H) 8.0 - 23.0 mg/dL    Creatinine 1.37 (H) 0.51 - 0.95 mg/dL    GFR Estimate 39 (L) >60 mL/min/1.73m2    Calcium 8.7 (L) 8.8 - 10.2 mg/dL    Glucose 70 70 - 99 mg/dL   Magnesium   Result Value Ref Range    Magnesium 2.6 (H) 1.7 - 2.3 mg/dL   CBC with platelets   Result Value Ref Range    WBC Count 13.7 (H) 4.0 - 11.0 10e3/uL    RBC Count 3.60 (L) 3.80 - 5.20 10e6/uL    Hemoglobin 10.2 (L) 11.7 - 15.7 g/dL    Hematocrit 34.9 (L) 35.0 - 47.0 %    MCV 97 78 - 100 fL    MCH 28.3 26.5 - 33.0 pg    MCHC 29.2 (L) 31.5 - 36.5 g/dL    RDW 16.1 (H) 10.0 - 15.0 %    Platelet Count 244 150 - 450 10e3/uL   Hepatic function panel   Result Value Ref Range    Protein Total 6.5 6.4 - 8.3 g/dL    Albumin 2.9 (L) 3.5 - 5.2 g/dL    Bilirubin Total 0.3 <=1.2 mg/dL    Alkaline Phosphatase 40 35 - 104 U/L    AST 44 0 - 45 U/L    ALT 6 0 - 50 U/L    Bilirubin Direct           Imaging    CT Abdomen Pelvis w Contrast    Result Date: 9/29/2023  EXAM: CT ABDOMEN PELVIS W CONTRAST LOCATION: [facility] DATE: 9/29/2023 INDICATION: acute onset right upper quadrant abdominal pain COMPARISON: Ultrasound 9/28/2023 TECHNIQUE: CT scan of the abdomen and pelvis was performed following injection of IV contrast. Multiplanar reformats were obtained. Dose reduction techniques were used. CONTRAST: 53ml Isovue 370 FINDINGS: LOWER CHEST: Tiny right pleural effusion with associated right lower lobe airspace opacities and 0.3 cm middle lobe nodule, unchanged compared to 9/28/2023 HEPATOBILIARY: Normal. PANCREAS: Normal. SPLEEN: Normal. ADRENAL  GLANDS: 5.7 x 3.5 cm right adrenal nodule with surrounding high attenuation that extends inferiorly along the right kidney. 1.8 x 1.4 cm left adrenal nodule (3/53). Both are new compared to PET/CT of 8/11/2023. KIDNEYS/BLADDER: No significant mass, stones, or hydronephrosis. There are simple or benign cysts. No follow up is needed. Circumferential bladder wall thickening. BOWEL: Normal. LYMPH NODES: No lymphadenopathy. VASCULATURE: Moderate multifocal atherosclerotic calcification of the abdominal aorta and iliofemoral arteries. PELVIC ORGANS: Normal. MUSCULOSKELETAL: Osteopenia and degenerative changes in the spine with dextroconvex curvature of the lumbar spine. Moderate T12 compression deformity is unchanged. Left hip surgical hardware. No aggressive or destructive osseous lesions. Partially imaged  subcutaneous nodule in the right lower back posteriorly.     IMPRESSION: 1.  Bilateral adrenal nodules are new compared to 8/11/2023 and concerning for metastases. There is high attenuation fluid associated with the right adrenal nodule, which is likely hemorrhage and likely accounts for acute right upper quadrant pain. Hemorrhage extends inferiorly in the retroperitoneum along the right kidney. 2.  Tiny right pleural effusion with associated airspace opacities.    US Abdomen Limited*    Result Date: 9/28/2023  EXAM: US ABDOMEN LIMITED LOCATION: [facility] DATE: 9/28/2023 INDICATION: Acute onset right upper quadrant pain radiating to the back. COMPARISON: PET/CT 08/11/2023. TECHNIQUE: Limited abdominal ultrasound. FINDINGS: GALLBLADDER: No gallstones, wall thickening, or pericholecystic fluid. Negative sonographic Álavrez's sign. BILE DUCTS: No biliary dilatation. The common duct measures 2 mm. LIVER: Normal parenchyma with smooth contour. No focal mass. RIGHT KIDNEY: No hydronephrosis. PANCREAS: The visualized portions are normal. No ascites. Incompletely seen right upper quadrant mass,  measuring at least 5.5 cm.     IMPRESSION: 1.  Right upper quadrant mass (possibly adrenal in origin), suspicious for metastatic disease. Recommend contrast enhanced CT abdomen - pelvis. 2.  No cholelithiasis. Gallbladder wall thickness is exaggerated by partial distention of the gallbladder. Accounting for this, no definitive evidence for acute cholecystitis. 3.  Remaining exam unremarkable.     CT Chest Pulmonary Embolism w Contrast    Result Date: 9/28/2023  EXAM: CT CHEST PULMONARY EMBOLISM W CONTRAST LOCATION: [facility] DATE: 9/28/2023 INDICATION: Chest pain, dyspnea COMPARISON: PET/CT from 08/11/2023 TECHNIQUE: CT chest pulmonary angiogram during arterial phase injection of IV contrast. Multiplanar reformats and MIP reconstructions were performed. Dose reduction techniques were used. CONTRAST: 75ml Isovue 370 FINDINGS: ANGIOGRAM CHEST: There is mixing artifact in the right lower lobe pulmonary artery on series 7 image 145. Pulmonary arteries are normal caliber and negative for pulmonary emboli. Thoracic aorta is negative for dissection. LUNGS AND PLEURA: Slight interval decrease in the size of the peripheral right upper lobe pulmonary nodule measuring 1.9 x 1.9 cm compared to 2.3 x 2.1 cm previously (series 8 image 116). However, there is increased size of the 2 satellite nodules. For example on series 8 image 131 measuring 0.8 x 0.6 mm compared to 0.6 x 0.4 cm. There is a satellite nodule just superior to this which is new or more conspicuous compared to prior exam measuring 1.0 x 0.9 cm (series 8 image 123). The satellite nodule superior to the dominant right lobe nodule on series 8 image 91 is similar measuring 1.2 x 0.6 cm.There is a new satellite nodule in the right upper lobe on series 8 image 92 measuring 0.7 x 0.5 cm. Advanced emphysema. Scattered areas of atelectasis and scarring. Similar pleural thickening and architectural distortion in the anterior left upper lobe/left  lingula on series 8 image 118. Moderate to large volume airway secretions particularly in the right lung and right lower lobe with bronchial wall thickening, mucous plugging, with postobstructive consolidation and groundglass opacities. Mild bronchial wall thickening. MEDIASTINUM/AXILLAE: Mild cardiomegaly. Mild reflux of contrast in the IVC and proximal hepatic veins. Persistent enlarged external adenopathy. A few of these are mildly increased in size compared to prior exam. The largest is in the lower paratracheal chain measuring 2.1 x 1.5 cm on series 7 image 86 (previously 1.7 x 1.3 cm). CORONARY ARTERY CALCIFICATION: Mild. UPPER ABDOMEN: Ectasia of the infrarenal abdominal aorta measuring up to 2.8 cm. Extensive atherosclerosis. MUSCULOSKELETAL: Degenerative changes of the spine. Chronic severe T12 compression deformity with associated focal kyphosis. No suspicious osseous lesions.     IMPRESSION: 1.  No pulmonary embolus. 2.  New manifestations of aspiration pneumonitis/pneumonia in the right lower lobe with moderate to large volume airway secretions. 3.  Slightly decreased size of the dominant nodule within the right upper lobe. 4.  However, there are a few new or increasing satellite nodules in the right lung. Additionally, interval increase in the size of several of the mediastinal lymph nodes. Continued attention on follow-up. 5.  Advanced emphysema.    XR Chest Port 1 View    Result Date: 9/28/2023  EXAM: XR CHEST PORT 1 VIEW LOCATION: [facility] DATE: 9/28/2023 INDICATION: right side chest pain COMPARISON: 09/23/2023 and multiple prior studies, PET CT 08/11/2023 and older exams.     IMPRESSION: No interval change given shallower inspiration. The extensive emphysema is better appreciated on the CT. Known, inferior right upper lobe pulmonary nodule again noted and overlies the right posterior seventh rib. Bandlike fibroatelectasis in the left midlung and mild bibasilar fibrosis  unchanged. No signs of acute pneumonia or failure. Heart and pulmonary vascularity are normal.    XR Foot Port Right 2 Views    Result Date: 9/23/2023  EXAM: XR FOOT PORT RIGHT 2 VIEWS LOCATION: [facility] DATE: 9/23/2023 INDICATION: pain, bruisng, swelling over 5th metatarsal COMPARISON: None.     IMPRESSION: The right foot is negative for fracture. There is significant soft tissue swelling along the dorsal aspect of the foot. Cellulitis could also have this appearance.    XR Chest Port 1 View    Result Date: 9/23/2023  EXAM: XR CHEST PORT 1 VIEW LOCATION: [facility] DATE: 9/23/2023 INDICATION: shortness of breath, COPD vs CHF exacerbation COMPARISON: 09/15/2023     IMPRESSION: Heart size is stable. Right midlung nodule measuring 2.5 cm, unchanged. Decrease in linear parenchymal opacity in the left midlung. Chronic interstitial scarring/fibrosis bilaterally. There is some increasing pleural thickening in the left upper lobe. No pneumothorax. Old right rib fracture.    XR Chest Port 1 View    Result Date: 9/15/2023  EXAM: XR CHEST PORT 1 VIEW LOCATION: [facility] DATE: 9/15/2023 INDICATION: dyspnea COMPARISON: 08/28/2023     IMPRESSION: Since 08/28/2023 there has been increase in the patchy infiltrate in the left midlung worrisome for pneumonitis. The chest is otherwise stable to include findings of bibasilar atelectasis and area of nodularity projected over the right midlung laterally.     30 minutes spent on the date of the encounter doing chart review, history and exam, documentation, communication of the treatment plan with the care team and further activities as noted above.    Signed by: Devon Suarez MD

## 2023-10-12 NOTE — PROGRESS NOTES
[facility]     Code Status:  DNR/DNI  Visit Type: RECHECK     Facility:   [facility] ([facility] [place]) [[id number]]        History of Present Illness:   Hospital Admission Date: 9/23/2023     Hospital Discharge Date: 10/5/2023      Irene León is a 78 year old female with past medical history for HFrEF, chronic O2, COPD,hx of Lung cancer on oral chemo, CKD, Factor 5, HLD,  hx of TIA, depression and anxiety. She was recently hospitalized for acute hypoxic respiratory failure 2/2 COPD and CHF.  CXR also showed aspiration pneumonitis and pneumonia. She was treated with IV ceftriaxone and oral doxycycline and prednisone with improved respiratory status.  Cardiology changed her lasix to Bumex with good resoluation of her edema.      FYI; patient's  and primary caregiver passed away in the ICU during this hospitalization.      She had right sided chest pain and workup showed CT with new right adrenal mass with hemorrhage with concern for metastasis.  She was seen by Hemonc and okayed to resume xarelto and patient chose to resume sotorasib. Surgery recommended conservative management and consider dcing xarelto if she has continued bleeding.     Today, patient reports her breathing is not back to baseline.  She is on 3L of O2 at rest.  She reports she needs longer recovery than she typically does.  Nursing reports she desats with standing, into the 80s.  SW discussed hospice options with patient and family yesterday and they weren't receptive.       Current Outpatient Medications   Medication Sig Dispense Refill    acetaminophen (TYLENOL) 500 MG tablet Take 1,000 mg by mouth every 8 hours as needed for mild pain or fever       acetylcysteine (MUCOMYST) 10 % nebulizer solution Inhale 4 mLs into the lungs every 4 hours as needed for mucolysis/respiratory distress      albuterol (PROAIR HFA/PROVENTIL HFA/VENTOLIN HFA) 108  (90 Base) MCG/ACT inhaler Inhale 2 puffs into the lungs every 6 hours as needed for shortness of breath, wheezing or cough      Azelastine HCl 137 MCG/SPRAY SOLN Spray 1 spray into both nostrils 2 times daily as needed for rhinitis      benzonatate (TESSALON PERLES) 100 MG capsule Take 1 capsule (100 mg) by mouth 3 times daily as needed for cough 90 capsule 3    bumetanide (BUMEX) 1 MG tablet Take 1 tablet (1 mg) by mouth 2 times daily 60 tablet 0    cetirizine (ZYRTEC) 5 MG tablet Take 1 tablet (5 mg) by mouth daily as needed for allergies      chlorhexidine (PERIDEX) 0.12 % solution [CHLORHEXIDINE (PERIDEX) 0.12 % SOLUTION] Apply 15 mL to the mouth or throat at bedtime as needed.       escitalopram (LEXAPRO) 10 MG tablet Take 1 tablet (10 mg) by mouth daily 90 tablet 3    famotidine (PEPCID) 20 MG tablet Take 1 tablet (20 mg) by mouth every other day 90 tablet 0    fluticasone (FLONASE) 50 MCG/ACT nasal spray Spray 1 spray into both nostrils daily as needed for rhinitis or allergies      Fluticasone-Umeclidin-Vilanterol (TRELEGY ELLIPTA) 200-62.5-25 MCG/INH oral inhaler Inhale 1 puff into the lungs At Bedtime      gabapentin (NEURONTIN) 300 MG capsule Take 1 capsule (300 mg) by mouth at bedtime 30 capsule 0    ipratropium (ATROVENT HFA) 17 MCG/ACT inhaler Inhale 2 puffs into the lungs every 6 hours as needed for wheezing      ipratropium - albuterol 0.5 mg/2.5 mg/3 mL (DUONEB) 0.5-2.5 (3) MG/3ML neb solution Take 1 vial by nebulization every 6 hours as needed for shortness of breath, wheezing or cough      Melatonin 5 MG TBDP Take 15 mg by mouth At Bedtime      metoprolol succinate ER (TOPROL XL) 50 MG 24 hr tablet Take 1 tablet (50 mg) by mouth At Bedtime 30 tablet 0    mirabegron (MYRBETRIQ) 25 MG 24 hr tablet Take 1 tablet (25 mg) by mouth daily 30 tablet 0    nystatin (MYCOSTATIN) 272735 UNIT/GM external cream Apply topically 3 times daily as needed for dry skin      OLANZapine (ZYPREXA) 5 MG tablet Take 1  tablet (5 mg) by mouth At Bedtime 30 tablet 3    prochlorperazine (COMPAZINE) 10 MG tablet Take 1 tablet (10 mg) by mouth every 6 hours as needed (Nausea/Vomiting) 30 tablet 5    rivaroxaban ANTICOAGULANT (XARELTO) 15 MG TABS tablet Take 1 tablet (15 mg) by mouth At Bedtime      simvastatin (ZOCOR) 40 MG tablet Take 1 tablet (40 mg) by mouth At Bedtime 90 tablet 3    sodium chloride 0.9 % neb solution Take 3 mLs by nebulization every 3 hours as needed for wheezing      sotorasib (LUMAKRAS) 120 MG tablet Take 6 tablets (720 mg) by mouth daily for 30 days Do not chew, crush or split tablets. 180 tablet 0    spironolactone (ALDACTONE) 25 MG tablet Take 1 tablet (25 mg) by mouth daily 30 tablet 0    tamoxifen (NOLVADEX) 20 MG tablet Take 1 tablet (20 mg) by mouth daily 90 tablet 3    traMADol (ULTRAM) 50 MG tablet Take 1 tablet (50 mg) by mouth 2 times daily as needed for severe pain 60 tablet 0    zinc oxide (DESITIN) 40 % external ointment Apply topically as needed for dry skin or irritation (Twice a day to gluteal area skin irritation) 113 g 1     Allergies   Allergen Reactions    Sulfa (Sulfonamide Antibiotics) [Sulfa Antibiotics] Rash     Headaches and dizziness.    Homeopathic Drugs [External Allergen Needs Reconciliation - See Comment] Unknown     runny nose    Mold Extracts [Molds & Smuts] Unknown    Morphine (Pf) [Morphine] Unknown     hallucinate    Lisinopril Itching, Cough and Unknown     cough    Sulfacetamide Sodium [Sulfacetamide] Rash     Immunization History   Administered Date(s) Administered    COVID-19 Bivalent 12+ (Pfizer) 03/02/2021, 12/08/2021, 11/16/2022    COVID-19 MONOVALENT 12+ (Pfizer) 03/02/2021, 03/23/2021, 12/08/2021    COVID-19 Monovalent 18+ (Moderna) 03/02/2021    FLU 6-35 months 11/03/2003    Flu 65+ Years 09/20/2017, 09/25/2018, 10/10/2019    Flu, Unspecified 10/01/2016, 10/01/2018    [id number] Novel Flu 01/05/2010    Influenza (H1N1) 01/05/2010    Influenza (High Dose) 3 valent vaccine  "10/30/2014, 10/01/2015, 11/26/2016, 09/25/2018, 10/10/2019, 10/19/2020, 11/16/2022    Influenza (IIV3) PF 11/03/2003, 11/08/2006, 10/24/2007, 11/28/2008, 10/09/2009, 10/22/2010, 01/26/2012, 10/03/2012, 11/01/2013    Influenza Vaccine 65+ (Fluzone HD) 09/20/2017, 10/19/2020, 12/15/2020, 11/16/2022    Mantoux Tuberculin Skin Test 12/13/2007, 10/24/2020, 11/03/2020    Pneumo Conj 13-V (2010&after) 05/11/2015    Pneumococcal 23 valent 11/24/1997, 11/21/2007, 05/17/2017    TD,PF 7+ (Tenivac) 07/20/2004    TDAP (Adacel,Boostrix) 02/12/2016    TDAP Vaccine (Boostrix) 02/12/2016    Zoster recombinant adjuvanted (SHINGRIX) 07/31/2020    Zoster vaccine, live 02/18/2009, 10/20/2014, 07/31/2020         Review of Systems   Patient denies fever, chills, headache, lightheadedness, dizziness, rhinorrhea, cough, congestion,  palpitations, abdominal pain, n/v, diarrhea, constipation, change in appetite, change in sleep pattern, dysuria, frequency, burning or pain with urination.  Other than stated in HPI all other review of systems is negative.       Physical Exam  Vital signs:/72   Pulse 75   Temp 97.9  F (36.6  C)   Resp 18   Ht 1.575 m (5' 2\")   Wt 50.3 kg (111 lb)   SpO2 98%   BMI 20.30 kg/m     GENERAL APPEARANCE: thin, frail elderly female,  in no acute distress.  HEENT: normocephalic, atraumatic  sclerae anicteric, conjunctivae clear and moist, EOM intact  LUNGS: expiratory rhonchi (honk) BLL, respiratory effort normal. O2 3L   CARD: RRR, S1, S2, without murmurs, gallops, rubs  ABD: Soft, nondistended and nontender with normal bowel sounds.   MSK: Muscle strength and tone were equal bilaterally. Moves all extremities easily and intentionally.   EXTREMITIES: No cyanosis, clubbing or edema.  NEURO: Alert and oriented x 3. Face is symmetric.  SKIN: hemosiderin staining of shins and purpura and scabbing.   PSYCH: euthymic        Labs:    Last Comprehensive Metabolic Panel:  Lab Results   Component Value Date     " 10/10/2023    POTASSIUM 4.6 10/10/2023    CHLORIDE 104 10/10/2023    CO2 25 10/10/2023    ANIONGAP 12 10/10/2023    GLC 70 10/10/2023    BUN 27.0 (H) 10/10/2023    CR 1.37 (H) 10/10/2023    GFRESTIMATED 39 (L) 10/10/2023    MESSI 8.7 (L) 10/10/2023       Lab Results   Component Value Date    WBC 15.7 10/05/2023     Lab Results   Component Value Date    RBC 3.54 10/05/2023     Lab Results   Component Value Date    HGB 10.1 10/05/2023     Lab Results   Component Value Date    HCT 32.2 10/05/2023     Lab Results   Component Value Date    MCV 91 10/05/2023     Lab Results   Component Value Date    MCH 28.5 10/05/2023     Lab Results   Component Value Date    MCHC 31.4 10/05/2023     Lab Results   Component Value Date    RDW 15.4 10/05/2023     Lab Results   Component Value Date     10/05/2023           Assessment/plan:   Acute and chronic respiratory failure with hypoxia (H)  Pulmonary emphysema, unspecified emphysema type (H)  COPD exacerbation (H)  Symptoms improving, no longer on Augmentin or prednisone.  Continue with inhalers.  Question her discharge plan due to having no care giver.    suddenly last week.     Acute on chronic diastolic congestive heart failure (H)  Compensated, continue with spironolactone and Bumex.     Malignant neoplasm of lung, unspecified laterality, unspecified part of lung (H)  Adrenal mass (H24)  Discussed goals of care.  Patient sees palliative care for symptom management.  She does not want to discuss hospice at this time.  She has an oncology visit scheduled today.  Advised patient to continue to discuss goals of care. Family supplying home sotorasib    Pneumonia due to infectious organism, unspecified laterality, unspecified part of lung  Symptoms improving, counseled on s/s of aspiration.    Augmentin and prednisone completed       Factor 5 Leiden mutation, heterozygous (H24)  Noted, continue with home Xarelto.     Gastroesophageal reflux disease with esophagitis  without hemorrhage  Continue with TUMS and famotidine.         Electronically signed by: Rayna Carbajal NP

## 2023-10-16 NOTE — PROGRESS NOTES
"                                                                                  [facility]      Code Status:  DNR/DNI  Visit Type: RECHECK     Facility:   [facility] ([facility]) [[id number]]         History of Present Illness: Irene León is a 78 year old female with PMH for COPD, pulmonary emphysema, chronic O2 supplementation, HFrEF, HDL, PAD, paroxsymal A-fib, hx of TIA, Factor 5 mutation, depression, anxiety, lung cancer and breast cancer. She was hospitalized for acute hypoxic respiratory distress 2/2 COPD and CHF; received Duonebs x2, solumedrol.   During her hospitalization, increased LE edema prompted Cardiology to change her Lasix to Bumex.  Reports of diaphoresis, nausea, and bilious emesis prompted Chest XR and CT of abdomen/pelvis which showed new onset aspiration pneumonitis/pneumonia in the right lower lobe and right adrenal nodule hemorrhage. She was also treated with IV ceftriaxone, oral doxycycline, and prednisone. Hem/Onc consulted likely new metastasis to adrenal glands; biopsy deferred, okay to resume xarelto, patient elected to continue sotorasib for cancer treatment.     Patient is seen today for a follow-up visit in TCU. Patient notes she feels \"real winded\" with activity; recovers with increased O2. Notes she is at baseline. At home, she uses 3L at rest and 5-6L on exertion with O2 sats ranging from 88-92% at rest and as low as 87% on exertion. She is working with therapies. She notes chronic low back pain that is relieved with tramadol; \"gabapentin doesn't do anything for me\". Appetite is good. Sleeping well. She's had incontinence for \"several years\". Denies CP, palpitations, lightheadedness, dizziness, fatigue, fever, chills, nausea/vomiting, or bowel concerns today.      Current Outpatient Medications   Medication Sig Dispense Refill    acetaminophen (TYLENOL) 500 MG tablet Take 1,000 mg by mouth every 8 hours as needed for mild pain or fever       " acetylcysteine (MUCOMYST) 10 % nebulizer solution Inhale 4 mLs into the lungs every 4 hours as needed for mucolysis/respiratory distress      albuterol (PROAIR HFA/PROVENTIL HFA/VENTOLIN HFA) 108 (90 Base) MCG/ACT inhaler Inhale 2 puffs into the lungs every 6 hours as needed for shortness of breath, wheezing or cough      Azelastine HCl 137 MCG/SPRAY SOLN Spray 1 spray into both nostrils 2 times daily as needed for rhinitis      benzonatate (TESSALON PERLES) 100 MG capsule Take 1 capsule (100 mg) by mouth 3 times daily as needed for cough 90 capsule 3    bumetanide (BUMEX) 1 MG tablet Take 1 tablet (1 mg) by mouth 2 times daily 60 tablet 0    cetirizine (ZYRTEC) 5 MG tablet Take 1 tablet (5 mg) by mouth daily as needed for allergies      chlorhexidine (PERIDEX) 0.12 % solution [CHLORHEXIDINE (PERIDEX) 0.12 % SOLUTION] Apply 15 mL to the mouth or throat at bedtime as needed.       escitalopram (LEXAPRO) 10 MG tablet Take 1 tablet (10 mg) by mouth daily 90 tablet 3    famotidine (PEPCID) 20 MG tablet Take 1 tablet (20 mg) by mouth every other day 90 tablet 0    fluticasone (FLONASE) 50 MCG/ACT nasal spray Spray 1 spray into both nostrils daily as needed for rhinitis or allergies      Fluticasone-Umeclidin-Vilanterol (TRELEGY ELLIPTA) 200-62.5-25 MCG/INH oral inhaler Inhale 1 puff into the lungs At Bedtime      gabapentin (NEURONTIN) 300 MG capsule Take 1 capsule (300 mg) by mouth at bedtime 30 capsule 0    ipratropium (ATROVENT HFA) 17 MCG/ACT inhaler Inhale 2 puffs into the lungs every 6 hours as needed for wheezing      ipratropium - albuterol 0.5 mg/2.5 mg/3 mL (DUONEB) 0.5-2.5 (3) MG/3ML neb solution Take 1 vial by nebulization every 6 hours as needed for shortness of breath, wheezing or cough      Melatonin 5 MG TBDP Take 15 mg by mouth At Bedtime      metoprolol succinate ER (TOPROL XL) 50 MG 24 hr tablet Take 1 tablet (50 mg) by mouth At Bedtime 30 tablet 0    mirabegron (MYRBETRIQ) 25 MG 24 hr tablet Take 1  "tablet (25 mg) by mouth daily 30 tablet 0    nystatin (MYCOSTATIN) 064673 UNIT/GM external cream Apply topically 3 times daily as needed for dry skin      OLANZapine (ZYPREXA) 5 MG tablet Take 1 tablet (5 mg) by mouth At Bedtime 30 tablet 3    prochlorperazine (COMPAZINE) 10 MG tablet Take 1 tablet (10 mg) by mouth every 6 hours as needed (Nausea/Vomiting) 30 tablet 5    rivaroxaban ANTICOAGULANT (XARELTO) 15 MG TABS tablet Take 1 tablet (15 mg) by mouth At Bedtime      simvastatin (ZOCOR) 40 MG tablet Take 1 tablet (40 mg) by mouth At Bedtime 90 tablet 3    sodium chloride 0.9 % neb solution Take 3 mLs by nebulization every 3 hours as needed for wheezing      [START ON 10/26/2023] sotorasib (LUMAKRAS) 120 MG tablet Take 8 tablets (960 mg) by mouth daily for 30 days Do not chew, crush or split tablets. 240 tablet 0    spironolactone (ALDACTONE) 25 MG tablet Take 1 tablet (25 mg) by mouth daily 30 tablet 0    tamoxifen (NOLVADEX) 20 MG tablet Take 1 tablet (20 mg) by mouth daily 90 tablet 3    traMADol (ULTRAM) 50 MG tablet Take 1 tablet (50 mg) by mouth 2 times daily as needed for severe pain 60 tablet 0    zinc oxide (DESITIN) 40 % external ointment Apply topically as needed for dry skin or irritation (Twice a day to gluteal area skin irritation) 113 g 1       Review of Systems   10 point ROS neg other than the symptoms noted above in the HPI.    Physical Exam  /72   Pulse 75   Temp 97.9  F (36.6  C)   Resp 18   Ht 1.575 m (5' 2\")   Wt 51.7 kg (114 lb)   SpO2 97%   BMI 20.85 kg/m    Constitutional: alert, no distress, petite, sitting in wheelchair  ENT: EOM intact, dentulous, normal hearing acuity  Cardiovascular: regular rate, S1 S2 noted, 2+ edema around ankles bilaterally  Respiratory: normal respiratory rate and rhythm, lungs clear to auscultation, cough none  Gastrointestinal: Abdomen soft, non-tender. BS normal.   Musculoskeletal: wheelchair bound, moves all extremities, arthritic changes noted " on hands  Skin: warm, fragile, thin, ecchymosis/scabbing noted on BLE.   Neurologic: normal sensation with light touch, follows and tracks  Psychiatric: hopeful    Labs:  Recent labs in [facility] reviewed by me today.       Assessment/Plan:     Acute and chronic respiratory failure with hypoxia  COPD exacerbation  Pulmonary emphysema   Pneumonia  Improved. Augmentin completed on 10/06.Prednisone completed on 10/11. Patient reports baseline O2 is 3L at rest, 5-6L on exertion. No SOB or dyspnea.   -Albuterol inhaler at bedside; okay to self-administer    Malignant neoplasm of lung  Adrenal mass  No new sx's reported today.  -Oncology following  -Family to supply home sotorasib    Acute on chronic diastolic congestive heart failure  Chronic kidney disease, stage IV   Stable. Lasix dc'd.  -Continue Bumex and spironolactone  -Duonebs    Essential hypertension  Stable.   -Metoprolol 50 mg daily    Factor 5 Leiden mutation  Noted.   -Continue home Xarelto    PAD (peripheral artery disease)  Noted  -Monitor clinically.    GERD  Controlled.  -Continue Tums and famotidine.    Lesion of sciatic nerve  Patient reported history. States gabapentin ineffective.   -Taper gabapentin to 100 mg at bedtime for 3 days then, discontinue.       Electronically signed by:Dr. Gaby Sanchez, APRN, DNP, AGNP-BC    Due to diagnosis of generalized weakness secondary to COPD and emphysema my patient will require and benefit from a standard wheelchair for lifetime use.    Patient has mobility limitation that significantly impairs her ability to participate in mobility related and ADLs such as food prep, dressing, transfers and ambulation.  This limitation cannot be sufficiently and safely resolved by the use of a cane, walker or crutches due to weakness related to COPD and emphysema.  Patient and caregiver are able to safely use a wheelchair and mobility deficit can be resolved with its use.  Patient's home provides adequate access and maneuvering  space.  Patient will use a wheelchair daily and has not expressed an unwillingness to use it within her home.

## 2023-10-18 NOTE — LETTER
10/18/2023        RE: Irene León  [address] [facility] [address]        [facility]       Patient Irene León  MRN: [id number]        Reason for Visit     Chief Complaint   Patient presents with     Follow Up       Code Status     DNR / DNI    Assessment     COPD  Chronic oxygen dependence  HFrEF  Breast cancer  Lung cancer on oral chemotherapy  CKS  Factor V Leiden  Hyperlipidemia  Hypertension  Depression  Anxiety  CKD  GERD    Plan     Pt is admitted to TCU for strengthening and rehab.  Patient was admitted with hypoxic respiratory failure with severe COPD.  Currently remains oxygen dependent and continues to voice severe shortness of breath  Seen by medical oncology  Her dosage of sotorasib has been increased to 960 mg daily  She will follow-up with oncology as an outpatient for further treatment plans and also will need a CT scan for follow-up on her adrenal mass  Has CHF and weights have been stable  Cont diuretics  Tapered off gabapentin due to lack of efficacy  R/c creat stable at 1.3  Endurance and stamina are limited and she reports anxiety and shortness of breath is what limits her the most    History     Patient is a very pleasant 78 year old female who is admitted to TCU    Hospitalized for acute hypoxic respiratory failure secondary to COPD and CHF. Noted to have aspiration pneumonitis. Improved with ceftriaxone, doxycyline, and prednisone. Discharged with Augmentin which patient has finished. Cardiology changed lasix to Bumex.   Recheck labs show a creatinine of 1.3 which is stable  CT also showed hemorrhage of adrenal mass. Heme/Onc okayed to resume Xarelto and patient wanted to restart sotorasib.  She has seen her primary oncologist and now is on a much higher dose at 960 mg based on the recommendation she plans to start the dose  Patient usually on 3L of oxygen at baseline and 5-6 with exacerbations. Currently, she is on 5L.     was previous caregiver of  patient. He has recently passed away.  She is grieving for the death of her     Past Medical & Surgical History     PAST MEDICAL HISTORY:   Past Medical History:   Diagnosis Date     ANATOLIY (acute kidney injury) (H24)      Allergic rhinitis      Arrhythmia      Atrial fibrillation with RVR (H)      Bacteremia      Breast cancer (H) 2017     Cardiomyopathy (H)      Centrilobular emphysema (H)      CHF (congestive heart failure) (H)      Chronic kidney disease      CKD (chronic kidney disease)      COPD (chronic obstructive pulmonary disease) (H)      Coronary artery disease      Depression      Dysphagia      E. coli sepsis (H)      Factor 5 Leiden mutation, heterozygous (H24)      GERD (gastroesophageal reflux disease)      Heart failure with reduced ejection fraction (H) 04/17/2023     Hx of radiation therapy 2017     Hyperlipidemia      Hypertension      Hypokalemia      Hypomagnesemia      Idiopathic cardiomyopathy (H)      Left hip pain 04/30/2014     OAB (overactive bladder)      Osteoporosis      Sacral insufficiency fracture      Sinusitis      Syncope      Urge incontinence      Vocal cord dysfunction       PAST SURGICAL HISTORY:   has a past surgical history that includes IR Abdominal Aortogram (4/16/2003); IR Miscellaneous Procedure (4/16/2003); IR Aortic Arch 4 Vessel Angiogram (4/16/2003); Arthroplasty revision hip (Left); OPEN TX FEMORAL FRACTURE DISTAL MED/LAT CONDYLE (Left, 10/28/2015); Cardiac catheterization; Cataract Extraction (Left, 07/18/2017); Biopsy breast (Right, 2017); Bladder surgery; Lumpectomy breast (Left, 06/2017); PICC/Midline Placement (4/7/2022); and PICC/Midline Placement (4/11/2022).      Past Social History     Reviewed,  reports that she quit smoking about 15 years ago. Her smoking use included cigarettes. She has been exposed to tobacco smoke. She quit smokeless tobacco use about 19 years ago. She reports that she does not drink alcohol and does not use drugs.    Family  History     Reviewed, and family history includes Breast Cancer in her maternal aunt; Osteoporosis in an other family member; Prostate Cancer in her brother and maternal uncle.    Medication List     Current Outpatient Medications   Medication     acetaminophen (TYLENOL) 500 MG tablet     acetylcysteine (MUCOMYST) 10 % nebulizer solution     albuterol (PROAIR HFA/PROVENTIL HFA/VENTOLIN HFA) 108 (90 Base) MCG/ACT inhaler     Azelastine HCl 137 MCG/SPRAY SOLN     benzonatate (TESSALON PERLES) 100 MG capsule     bumetanide (BUMEX) 1 MG tablet     cetirizine (ZYRTEC) 5 MG tablet     chlorhexidine (PERIDEX) 0.12 % solution     escitalopram (LEXAPRO) 10 MG tablet     famotidine (PEPCID) 20 MG tablet     fluticasone (FLONASE) 50 MCG/ACT nasal spray     Fluticasone-Umeclidin-Vilanterol (TRELEGY ELLIPTA) 200-62.5-25 MCG/INH oral inhaler     gabapentin (NEURONTIN) 300 MG capsule     ipratropium (ATROVENT HFA) 17 MCG/ACT inhaler     ipratropium - albuterol 0.5 mg/2.5 mg/3 mL (DUONEB) 0.5-2.5 (3) MG/3ML neb solution     Melatonin 5 MG TBDP     metoprolol succinate ER (TOPROL XL) 50 MG 24 hr tablet     mirabegron (MYRBETRIQ) 25 MG 24 hr tablet     nystatin (MYCOSTATIN) 072100 UNIT/GM external cream     OLANZapine (ZYPREXA) 5 MG tablet     prochlorperazine (COMPAZINE) 10 MG tablet     rivaroxaban ANTICOAGULANT (XARELTO) 15 MG TABS tablet     simvastatin (ZOCOR) 40 MG tablet     sodium chloride 0.9 % neb solution     [START ON 10/26/2023] sotorasib (LUMAKRAS) 120 MG tablet     spironolactone (ALDACTONE) 25 MG tablet     tamoxifen (NOLVADEX) 20 MG tablet     traMADol (ULTRAM) 50 MG tablet     zinc oxide (DESITIN) 40 % external ointment     No current facility-administered medications for this visit.      MED REC REQUIRED  Post Medication Reconciliation Status: discharge medications reconciled and changed, per note/orders       Allergies     Allergies   Allergen Reactions     Sulfa (Sulfonamide Antibiotics) [Sulfa Antibiotics] Rash  "    Headaches and dizziness.     Homeopathic Drugs [External Allergen Needs Reconciliation - See Comment] Unknown     runny nose     Mold Extracts [Molds & Smuts] Unknown     Morphine (Pf) [Morphine] Unknown     hallucinate     Lisinopril Itching, Cough and Unknown     cough     Sulfacetamide Sodium [Sulfacetamide] Rash       Review of Systems   A comprehensive review of 14 systems was done. Pertinent findings noted here and in history of present illness. All the rest negative.  Constitutional: Negative.  Negative for fever, chills, she has  activity change, appetite change and fatigue.   HENT: Negative for congestion and facial swelling.    Eyes: Negative for photophobia, redness and visual disturbance.   Respiratory: Negative for cough and chest tightness.    On supplemental o2 and reporting air hunger and tachypnea mostly precipitated by anxiety  Cardiovascular: Negative for chest pain, palpitations and leg swelling.   Gastrointestinal: Negative for nausea, diarrhea, constipation, blood in stool and abdominal distention.   Genitourinary: has frequency  Musculoskeletal: Positive for increased weakness.   Skin: Ecchymoses diffusely on body.    Neurological: Negative for dizziness, tremors, syncope, weakness, light-headedness and headaches.   Hematological: Does bruise/bleed easily - on Xarelto.   Psychiatric/Behavioral: has severe anxiety        Physical Exam   /72   Pulse 75   Temp 97.9  F (36.6  C)   Resp 18   Ht 1.575 m (5' 2\")   Wt 49.9 kg (110 lb)   SpO2 98%   BMI 20.12 kg/m     On 3l of o2  Constitutional: Oriented to person, place, and time.   HEENT:  Normocephalic and atraumatic.  Eyes: Conjunctivae normal. No discharge.  No scleral icterus. Nose normal. Mouth/Throat: Oropharynx is clear and moist.    CARDIOVASCULAR: Normal rate, regular rhythm and intact distal pulses.  PULMONARY: Effort normal and breath sounds normal. No respiratory distress. No Wheezing or rales.  ABDOMEN: Soft. No " distension and no mass. There is no tenderness.  NEUROLOGICAL: Alert and oriented to person, place, and time. Coordination normal.   SKIN: Skin is warm and dry. No rash noted. No pallor. Bilateral lower extremity bleeding underneath the skin.  Scattered ecchymosis seen in arms and legs with OA area  EXTREMITIES: No cyanosis, no edema.  PSYCHIATRIC: Oriented to person, place, and time.      Lab Results     Recent Results (from the past 240 hour(s))   Basic metabolic panel    Collection Time: 10/10/23  8:20 AM   Result Value Ref Range    Sodium 141 135 - 145 mmol/L    Potassium 4.6 3.4 - 5.3 mmol/L    Chloride 104 98 - 107 mmol/L    Carbon Dioxide (CO2) 25 22 - 29 mmol/L    Anion Gap 12 7 - 15 mmol/L    Urea Nitrogen 27.0 (H) 8.0 - 23.0 mg/dL    Creatinine 1.37 (H) 0.51 - 0.95 mg/dL    GFR Estimate 39 (L) >60 mL/min/1.73m2    Calcium 8.7 (L) 8.8 - 10.2 mg/dL    Glucose 70 70 - 99 mg/dL   Magnesium    Collection Time: 10/10/23  8:20 AM   Result Value Ref Range    Magnesium 2.6 (H) 1.7 - 2.3 mg/dL   CBC with platelets    Collection Time: 10/10/23  8:20 AM   Result Value Ref Range    WBC Count 13.7 (H) 4.0 - 11.0 10e3/uL    RBC Count 3.60 (L) 3.80 - 5.20 10e6/uL    Hemoglobin 10.2 (L) 11.7 - 15.7 g/dL    Hematocrit 34.9 (L) 35.0 - 47.0 %    MCV 97 78 - 100 fL    MCH 28.3 26.5 - 33.0 pg    MCHC 29.2 (L) 31.5 - 36.5 g/dL    RDW 16.1 (H) 10.0 - 15.0 %    Platelet Count 244 150 - 450 10e3/uL   Hepatic function panel    Collection Time: 10/10/23  8:20 AM   Result Value Ref Range    Protein Total 6.5 6.4 - 8.3 g/dL    Albumin 2.9 (L) 3.5 - 5.2 g/dL    Bilirubin Total 0.3 <=1.2 mg/dL    Alkaline Phosphatase 40 35 - 104 U/L    AST 44 0 - 45 U/L    ALT 6 0 - 50 U/L    Bilirubin Direct              Electronically signed by    Paola Rose MD      Sincerely,        VASHTI Sanchez

## 2023-10-18 NOTE — PROGRESS NOTES
[facility] GERIATRIC SERVICES       Patient Irene León  MRN: [id number]        Reason for Visit     Chief Complaint   Patient presents with    Follow Up       Code Status     DNR / DNI    Assessment     COPD  Chronic oxygen dependence  HFrEF  Breast cancer  Lung cancer on oral chemotherapy  CKS  Factor V Leiden  Hyperlipidemia  Hypertension  Depression  Anxiety  CKD  GERD    Plan     Pt is admitted to TCU for strengthening and rehab.  Patient was admitted with hypoxic respiratory failure with severe COPD.  Currently remains oxygen dependent and continues to voice severe shortness of breath  Seen by medical oncology  Her dosage of sotorasib has been increased to 960 mg daily  She will follow-up with oncology as an outpatient for further treatment plans and also will need a CT scan for follow-up on her adrenal mass  Has CHF and weights have been stable  Cont diuretics  Tapered off gabapentin due to lack of efficacy  R/c creat stable at 1.3  Endurance and stamina are limited and she reports anxiety and shortness of breath is what limits her the most    History     Patient is a very pleasant 78 year old female who is admitted to TCU    Hospitalized for acute hypoxic respiratory failure secondary to COPD and CHF. Noted to have aspiration pneumonitis. Improved with ceftriaxone, doxycyline, and prednisone. Discharged with Augmentin which patient has finished. Cardiology changed lasix to Bumex.   Recheck labs show a creatinine of 1.3 which is stable  CT also showed hemorrhage of adrenal mass. Heme/Onc okayed to resume Xarelto and patient wanted to restart sotorasib.  She has seen her primary oncologist and now is on a much higher dose at 960 mg based on the recommendation she plans to start the dose  Patient usually on 3L of oxygen at baseline and 5-6 with exacerbations. Currently, she is on 5L.     was previous caregiver of patient. He has recently passed away.  She is grieving for the death of her      Past Medical & Surgical History     PAST MEDICAL HISTORY:   Past Medical History:   Diagnosis Date    ANATOLIY (acute kidney injury) (H24)     Allergic rhinitis     Arrhythmia     Atrial fibrillation with RVR (H)     Bacteremia     Breast cancer (H) 2017    Cardiomyopathy (H)     Centrilobular emphysema (H)     CHF (congestive heart failure) (H)     Chronic kidney disease     CKD (chronic kidney disease)     COPD (chronic obstructive pulmonary disease) (H)     Coronary artery disease     Depression     Dysphagia     E. coli sepsis (H)     Factor 5 Leiden mutation, heterozygous (H24)     GERD (gastroesophageal reflux disease)     Heart failure with reduced ejection fraction (H) 04/17/2023    Hx of radiation therapy 2017    Hyperlipidemia     Hypertension     Hypokalemia     Hypomagnesemia     Idiopathic cardiomyopathy (H)     Left hip pain 04/30/2014    OAB (overactive bladder)     Osteoporosis     Sacral insufficiency fracture     Sinusitis     Syncope     Urge incontinence     Vocal cord dysfunction       PAST SURGICAL HISTORY:   has a past surgical history that includes IR Abdominal Aortogram (4/16/2003); IR Miscellaneous Procedure (4/16/2003); IR Aortic Arch 4 Vessel Angiogram (4/16/2003); Arthroplasty revision hip (Left); OPEN TX FEMORAL FRACTURE DISTAL MED/LAT CONDYLE (Left, 10/28/2015); Cardiac catheterization; Cataract Extraction (Left, 07/18/2017); Biopsy breast (Right, 2017); Bladder surgery; Lumpectomy breast (Left, 06/2017); PICC/Midline Placement (4/7/2022); and PICC/Midline Placement (4/11/2022).      Past Social History     Reviewed,  reports that she quit smoking about 15 years ago. Her smoking use included cigarettes. She has been exposed to tobacco smoke. She quit smokeless tobacco use about 19 years ago. She reports that she does not drink alcohol and does not use drugs.    Family History     Reviewed, and family history includes Breast Cancer in her maternal aunt; Osteoporosis in an other  family member; Prostate Cancer in her brother and maternal uncle.    Medication List     Current Outpatient Medications   Medication    acetaminophen (TYLENOL) 500 MG tablet    acetylcysteine (MUCOMYST) 10 % nebulizer solution    albuterol (PROAIR HFA/PROVENTIL HFA/VENTOLIN HFA) 108 (90 Base) MCG/ACT inhaler    Azelastine HCl 137 MCG/SPRAY SOLN    benzonatate (TESSALON PERLES) 100 MG capsule    bumetanide (BUMEX) 1 MG tablet    cetirizine (ZYRTEC) 5 MG tablet    chlorhexidine (PERIDEX) 0.12 % solution    escitalopram (LEXAPRO) 10 MG tablet    famotidine (PEPCID) 20 MG tablet    fluticasone (FLONASE) 50 MCG/ACT nasal spray    Fluticasone-Umeclidin-Vilanterol (TRELEGY ELLIPTA) 200-62.5-25 MCG/INH oral inhaler    gabapentin (NEURONTIN) 300 MG capsule    ipratropium (ATROVENT HFA) 17 MCG/ACT inhaler    ipratropium - albuterol 0.5 mg/2.5 mg/3 mL (DUONEB) 0.5-2.5 (3) MG/3ML neb solution    Melatonin 5 MG TBDP    metoprolol succinate ER (TOPROL XL) 50 MG 24 hr tablet    mirabegron (MYRBETRIQ) 25 MG 24 hr tablet    nystatin (MYCOSTATIN) 960426 UNIT/GM external cream    OLANZapine (ZYPREXA) 5 MG tablet    prochlorperazine (COMPAZINE) 10 MG tablet    rivaroxaban ANTICOAGULANT (XARELTO) 15 MG TABS tablet    simvastatin (ZOCOR) 40 MG tablet    sodium chloride 0.9 % neb solution    [START ON 10/26/2023] sotorasib (LUMAKRAS) 120 MG tablet    spironolactone (ALDACTONE) 25 MG tablet    tamoxifen (NOLVADEX) 20 MG tablet    traMADol (ULTRAM) 50 MG tablet    zinc oxide (DESITIN) 40 % external ointment     No current facility-administered medications for this visit.      MED REC REQUIRED  Post Medication Reconciliation Status: discharge medications reconciled and changed, per note/orders       Allergies     Allergies   Allergen Reactions    Sulfa (Sulfonamide Antibiotics) [Sulfa Antibiotics] Rash     Headaches and dizziness.    Homeopathic Drugs [External Allergen Needs Reconciliation - See Comment] Unknown     runny nose    Mold  "Extracts [Molds & Smuts] Unknown    Morphine (Pf) [Morphine] Unknown     hallucinate    Lisinopril Itching, Cough and Unknown     cough    Sulfacetamide Sodium [Sulfacetamide] Rash       Review of Systems   A comprehensive review of 14 systems was done. Pertinent findings noted here and in history of present illness. All the rest negative.  Constitutional: Negative.  Negative for fever, chills, she has  activity change, appetite change and fatigue.   HENT: Negative for congestion and facial swelling.    Eyes: Negative for photophobia, redness and visual disturbance.   Respiratory: Negative for cough and chest tightness.    On supplemental o2 and reporting air hunger and tachypnea mostly precipitated by anxiety  Cardiovascular: Negative for chest pain, palpitations and leg swelling.   Gastrointestinal: Negative for nausea, diarrhea, constipation, blood in stool and abdominal distention.   Genitourinary: has frequency  Musculoskeletal: Positive for increased weakness.   Skin: Ecchymoses diffusely on body.    Neurological: Negative for dizziness, tremors, syncope, weakness, light-headedness and headaches.   Hematological: Does bruise/bleed easily - on Xarelto.   Psychiatric/Behavioral: has severe anxiety        Physical Exam   /72   Pulse 75   Temp 97.9  F (36.6  C)   Resp 18   Ht 1.575 m (5' 2\")   Wt 49.9 kg (110 lb)   SpO2 98%   BMI 20.12 kg/m     On 3l of o2  Constitutional: Oriented to person, place, and time.   HEENT:  Normocephalic and atraumatic.  Eyes: Conjunctivae normal. No discharge.  No scleral icterus. Nose normal. Mouth/Throat: Oropharynx is clear and moist.    CARDIOVASCULAR: Normal rate, regular rhythm and intact distal pulses.  PULMONARY: Effort normal and breath sounds normal. No respiratory distress. No Wheezing or rales.  ABDOMEN: Soft. No distension and no mass. There is no tenderness.  NEUROLOGICAL: Alert and oriented to person, place, and time. Coordination normal.   SKIN: Skin is " warm and dry. No rash noted. No pallor. Bilateral lower extremity bleeding underneath the skin.  Scattered ecchymosis seen in arms and legs with OA area  EXTREMITIES: No cyanosis, no edema.  PSYCHIATRIC: Oriented to person, place, and time.      Lab Results     Recent Results (from the past 240 hour(s))   Basic metabolic panel    Collection Time: 10/10/23  8:20 AM   Result Value Ref Range    Sodium 141 135 - 145 mmol/L    Potassium 4.6 3.4 - 5.3 mmol/L    Chloride 104 98 - 107 mmol/L    Carbon Dioxide (CO2) 25 22 - 29 mmol/L    Anion Gap 12 7 - 15 mmol/L    Urea Nitrogen 27.0 (H) 8.0 - 23.0 mg/dL    Creatinine 1.37 (H) 0.51 - 0.95 mg/dL    GFR Estimate 39 (L) >60 mL/min/1.73m2    Calcium 8.7 (L) 8.8 - 10.2 mg/dL    Glucose 70 70 - 99 mg/dL   Magnesium    Collection Time: 10/10/23  8:20 AM   Result Value Ref Range    Magnesium 2.6 (H) 1.7 - 2.3 mg/dL   CBC with platelets    Collection Time: 10/10/23  8:20 AM   Result Value Ref Range    WBC Count 13.7 (H) 4.0 - 11.0 10e3/uL    RBC Count 3.60 (L) 3.80 - 5.20 10e6/uL    Hemoglobin 10.2 (L) 11.7 - 15.7 g/dL    Hematocrit 34.9 (L) 35.0 - 47.0 %    MCV 97 78 - 100 fL    MCH 28.3 26.5 - 33.0 pg    MCHC 29.2 (L) 31.5 - 36.5 g/dL    RDW 16.1 (H) 10.0 - 15.0 %    Platelet Count 244 150 - 450 10e3/uL   Hepatic function panel    Collection Time: 10/10/23  8:20 AM   Result Value Ref Range    Protein Total 6.5 6.4 - 8.3 g/dL    Albumin 2.9 (L) 3.5 - 5.2 g/dL    Bilirubin Total 0.3 <=1.2 mg/dL    Alkaline Phosphatase 40 35 - 104 U/L    AST 44 0 - 45 U/L    ALT 6 0 - 50 U/L    Bilirubin Direct              Electronically signed by    Paola Rose MD

## 2023-10-19 NOTE — LETTER
"    10/19/2023        RE: Irene León  [address]  [facility] [address]                                                                                      [facility]     Code Status:  DNR/DNI  Visit Type: RECHECK     Facility:   [facility] ([facility]) [[id number]]        History of Present Illness:   Hospital Admission Date: 9/23/2023     Hospital Discharge Date: 10/5/2023      Irene León is a 78 year old female with past medical history for HFrEF, chronic O2, COPD,hx of Lung cancer on oral chemo, CKD, Factor 5, HLD,  hx of TIA, depression and anxiety. She was recently hospitalized for acute hypoxic respiratory failure 2/2 COPD and CHF.  CXR also showed aspiration pneumonitis and pneumonia. She was treated with IV ceftriaxone and oral doxycycline and prednisone with improved respiratory status.  Cardiology changed her lasix to Bumex with good resoluation of her edema.      FYI; patient's  and primary caregiver passed away in the ICU during this hospitalization.      She had right sided chest pain and workup showed CT with new right adrenal mass with hemorrhage with concern for metastasis.  She was seen by [facility]emon[facility] and okayed to resume xarelto and patient chose to resume sotorasib. Surgery recommended conservative management and consider dcing xarelto if she has continued bleeding.     Today, patient requests a visit due to new onset \"bump\" on her right thoracic back.  Upon exam, she has a 2cm x 4cm raised solid mass of the right mid thoracic back.  She endorses it being no painful and it is non movable.  She does have ecchymosis of the bump.  She is requesting \"answers\" of what this is and is it related to her cancer.  She also requests an exam of a small hard bump of her mid chest which is 0.5cm hard affixed bump without erythema or edema.       Current Outpatient Medications   Medication Sig Dispense Refill     acetaminophen (TYLENOL) 500 MG tablet Take 1,000 mg by mouth " every 8 hours as needed for mild pain or fever        acetylcysteine (MUCOMYST) 10 % nebulizer solution Inhale 4 mLs into the lungs every 4 hours as needed for mucolysis/respiratory distress       albuterol (PROAIR HFA/PROVENTIL HFA/VENTOLIN HFA) 108 (90 Base) MCG/ACT inhaler Inhale 2 puffs into the lungs every 6 hours as needed for shortness of breath, wheezing or cough       Azelastine HCl 137 MCG/SPRAY SOLN Spray 1 spray into both nostrils 2 times daily as needed for rhinitis       benzonatate (TESSALON PERLES) 100 MG capsule Take 1 capsule (100 mg) by mouth 3 times daily as needed for cough 90 capsule 3     bumetanide (BUMEX) 1 MG tablet Take 1 tablet (1 mg) by mouth 2 times daily 60 tablet 0     cetirizine (ZYRTEC) 5 MG tablet Take 1 tablet (5 mg) by mouth daily as needed for allergies       chlorhexidine (PERIDEX) 0.12 % solution [CHLORHEXIDINE (PERIDEX) 0.12 % SOLUTION] Apply 15 mL to the mouth or throat at bedtime as needed.        escitalopram (LEXAPRO) 10 MG tablet Take 1 tablet (10 mg) by mouth daily 90 tablet 3     famotidine (PEPCID) 20 MG tablet Take 1 tablet (20 mg) by mouth every other day 90 tablet 0     fluticasone (FLONASE) 50 MCG/ACT nasal spray Spray 1 spray into both nostrils daily as needed for rhinitis or allergies       Fluticasone-Umeclidin-Vilanterol (TRELEGY ELLIPTA) 200-62.5-25 MCG/INH oral inhaler Inhale 1 puff into the lungs At Bedtime       ipratropium (ATROVENT HFA) 17 MCG/ACT inhaler Inhale 2 puffs into the lungs every 6 hours as needed for wheezing       ipratropium - albuterol 0.5 mg/2.5 mg/3 mL (DUONEB) 0.5-2.5 (3) MG/3ML neb solution Take 1 vial by nebulization every 6 hours as needed for shortness of breath, wheezing or cough       Melatonin 5 MG TBDP Take 15 mg by mouth At Bedtime       metoprolol succinate ER (TOPROL XL) 50 MG 24 hr tablet Take 1 tablet (50 mg) by mouth At Bedtime 30 tablet 0     mirabegron (MYRBETRIQ) 25 MG 24 hr tablet Take 1 tablet (25 mg) by mouth daily  90 tablet 2     nystatin (MYCOSTATIN) 869435 UNIT/GM external cream Apply topically 3 times daily as needed for dry skin       OLANZapine (ZYPREXA) 5 MG tablet Take 1 tablet (5 mg) by mouth At Bedtime 30 tablet 3     prochlorperazine (COMPAZINE) 10 MG tablet Take 1 tablet (10 mg) by mouth every 6 hours as needed (Nausea/Vomiting) 30 tablet 5     rivaroxaban ANTICOAGULANT (XARELTO) 15 MG TABS tablet Take 1 tablet (15 mg) by mouth At Bedtime       simvastatin (ZOCOR) 40 MG tablet Take 1 tablet (40 mg) by mouth At Bedtime 90 tablet 3     sodium chloride 0.9 % neb solution Take 3 mLs by nebulization every 3 hours as needed for wheezing       [START ON 10/26/2023] sotorasib (LUMAKRAS) 120 MG tablet Take 8 tablets (960 mg) by mouth daily for 30 days Do not chew, crush or split tablets. 240 tablet 0     spironolactone (ALDACTONE) 25 MG tablet Take 1 tablet (25 mg) by mouth daily 30 tablet 0     tamoxifen (NOLVADEX) 20 MG tablet Take 1 tablet (20 mg) by mouth daily 90 tablet 3     traMADol (ULTRAM) 50 MG tablet Take 1 tablet (50 mg) by mouth 2 times daily as needed for severe pain 60 tablet 0     zinc oxide (DESITIN) 40 % external ointment Apply topically as needed for dry skin or irritation (Twice a day to gluteal area skin irritation) 113 g 1     Allergies   Allergen Reactions     Sulfa (Sulfonamide Antibiotics) [Sulfa Antibiotics] Rash     Headaches and dizziness.     Homeopathic Drugs [External Allergen Needs Reconciliation - See Comment] Unknown     runny nose     Mold Extracts [Molds & Smuts] Unknown     Morphine (Pf) [Morphine] Unknown     hallucinate     Lisinopril Itching, Cough and Unknown     cough     Sulfacetamide Sodium [Sulfacetamide] Rash     Immunization History   Administered Date(s) Administered     COVID-19 Bivalent 12+ (Pfizer) 03/02/2021, 12/08/2021, 11/16/2022     COVID-19 MONOVALENT 12+ (Pfizer) 03/02/2021, 03/23/2021, 12/08/2021     COVID-19 Monovalent 18+ (Moderna) 03/02/2021     FLU 6-35 months  "11/03/2003     Flu 65+ Years 09/20/2017, 09/25/2018, 10/10/2019     Flu, Unspecified 10/01/2016, 10/01/2018     A6p3-01 Novel Flu 01/05/2010     Influenza (H1N1) 01/05/2010     Influenza (High Dose) 3 valent vaccine 10/30/2014, 10/01/2015, 11/26/2016, 09/25/2018, 10/10/2019, 10/19/2020, 11/16/2022     Influenza (IIV3) PF 11/03/2003, 11/08/2006, 10/24/2007, 11/28/2008, 10/09/2009, 10/22/2010, 01/26/2012, 10/03/2012, 11/01/2013     Influenza Vaccine 65+ (Fluzone HD) 09/20/2017, 10/19/2020, 12/15/2020, 11/16/2022     Mantoux Tuberculin Skin Test 12/13/2007, 10/24/2020, 11/03/2020     Pneumo Conj 13-V (2010&after) 05/11/2015     Pneumococcal 23 valent 11/24/1997, 11/21/2007, 05/17/2017     TD,PF 7+ (Tenivac) 07/20/2004     TDAP (Adacel,Boostrix) 02/12/2016     TDAP Vaccine (Boostrix) 02/12/2016     Zoster recombinant adjuvanted (SHINGRIX) 07/31/2020     Zoster vaccine, live 02/18/2009, 10/20/2014, 07/31/2020         Review of Systems   Patient denies fever, chills, headache, lightheadedness, dizziness, rhinorrhea, cough, congestion,  palpitations, abdominal pain, n/v, diarrhea, constipation, change in appetite, change in sleep pattern, dysuria, frequency, burning or pain with urination.  Other than stated in HPI all other review of systems is negative.       Physical Exam  Vital signs:/72   Pulse 75   Temp 97.9  F (36.6  C)   Resp 18   Ht 1.575 m (5' 2\")   Wt 49.4 kg (109 lb)   SpO2 95%   BMI 19.94 kg/m     GENERAL APPEARANCE: thin, frail elderly female,  in no acute distress.  HEENT: normocephalic, atraumatic  sclerae anicteric, conjunctivae clear and moist, EOM intact.  Hard lymph node of sternum  LUNGS: diminished bases, respiratory effort normal. O2 3L   CARD: RRR, S1, S2, without murmurs, gallops, rubs  ABD: Soft, nondistended and nontender with normal bowel sounds.   MSK: Muscle strength and tone were equal bilaterally. Moves all extremities easily and intentionally.   EXTREMITIES: No cyanosis, " clubbing or edema.  NEURO: Alert and oriented x 3. Face is symmetric.  SKIN:  right mid thoracic raised mass with irregular edges 2cm x 4cm, affixed, nonpainful.  Ecchymosis of mass. No surrounding s/s of hematoma, no edema or eyrthema.   PSYCH: euthymic        Labs:    Last Comprehensive Metabolic Panel:  Lab Results   Component Value Date     10/10/2023    POTASSIUM 4.6 10/10/2023    CHLORIDE 104 10/10/2023    CO2 25 10/10/2023    ANIONGAP 12 10/10/2023    GLC 70 10/10/2023    BUN 27.0 (H) 10/10/2023    CR 1.37 (H) 10/10/2023    GFRESTIMATED 39 (L) 10/10/2023    MESSI 8.7 (L) 10/10/2023       Lab Results   Component Value Date    WBC 15.7 10/05/2023     Lab Results   Component Value Date    RBC 3.54 10/05/2023     Lab Results   Component Value Date    HGB 10.1 10/05/2023     Lab Results   Component Value Date    HCT 32.2 10/05/2023     Lab Results   Component Value Date    MCV 91 10/05/2023     Lab Results   Component Value Date    MCH 28.5 10/05/2023     Lab Results   Component Value Date    MCHC 31.4 10/05/2023     Lab Results   Component Value Date    RDW 15.4 10/05/2023     Lab Results   Component Value Date     10/05/2023           Assessment/plan:   Mass on back  Lipoma vs mass, US to help determine next steps.  I did explain to the patient that I could not know if this was related to her cancer and would advise her to make a follow up appointment with oncology. I did call daughter and explained the plan of care.  Daughter requesting this be done emergently as she feels it is detrimental to the patient's mental health.  I explained that she did not have pain or s/s of bleeding and so could not make it an emergency.     Lymphadenopathy  Recommended patient follow up with oncology for further dx and explanation.     Malignant neoplasm of upper lobe of right lung (H)  Adrenal Mass  Goals of care, counseling/discussion  Again, I discussed with patient the need to follow up with oncology for questions  "regarding her prognosis.  Facility recommending 24 hour care at discharge as she continues to decompensate with any exertion.  Daughter would like any results call to her first to help with \"breaking the news\" to the patient.  Apparently, the patient does not know how \"sick\" she is and family plans to update her on her clinical status after her 's  this weekend.  Family seems to be struggling with current losses.  I did tell the daughter that I empathized with the current situation and would recommend continuing goals of care conversations with her mom.  Patient continues with sotorasib.      Pulmonary emphysema, unspecified emphysema type (H)  COPD exacerbation (H)  Respiratory status improved to new baseline.  3L at rest.  Continue with inhalers.     Acute on chronic diastolic congestive heart failure (H)  Compensated, continue with spironolactone and Bumex.     Factor 5 Leiden mutation, heterozygous (H24)  Noted, continue with home Xarelto.     Gastroesophageal reflux disease with esophagitis without hemorrhage  Continue with TUMS and famotidine.     45 total minutes spent with patient, therapy, nursing, SW and daughter in coordinating above plan of care and goals of care discussions.     Electronically signed by: Rayna Carbajal NP      Sincerely,        Rayna Carbajal NP      "

## 2023-10-19 NOTE — PROGRESS NOTES
"                                                                              [facility]     Code Status:  DNR/DNI  Visit Type: RECHECK     Facility:   [facility] ([facility]) [[id number]]        History of Present Illness:   Hospital Admission Date: 9/23/2023     Hospital Discharge Date: 10/5/2023      Irene León is a 78 year old female with past medical history for HFrEF, chronic O2, COPD,hx of Lung cancer on oral chemo, CKD, Factor 5, HLD,  hx of TIA, depression and anxiety. She was recently hospitalized for acute hypoxic respiratory failure 2/2 COPD and CHF.  CXR also showed aspiration pneumonitis and pneumonia. She was treated with IV ceftriaxone and oral doxycycline and prednisone with improved respiratory status.  Cardiology changed her lasix to Bumex with good resoluation of her edema.      FYI; patient's  and primary caregiver passed away in the ICU during this hospitalization.      She had right sided chest pain and workup showed CT with new right adrenal mass with hemorrhage with concern for metastasis.  She was seen by Hemon[facility] and okayed to resume xarelto and patient chose to resume sotorasib. Surgery recommended conservative management and consider dcing xarelto if she has continued bleeding.     Today, patient requests a visit due to new onset \"bump\" on her right thoracic back.  Upon exam, she has a 2cm x 4cm raised solid mass of the right mid thoracic back.  She endorses it being no painful and it is non movable.  She does have ecchymosis of the bump.  She is requesting \"answers\" of what this is and is it related to her cancer.  She also requests an exam of a small hard bump of her mid chest which is 0.5cm hard affixed bump without erythema or edema.       Current Outpatient Medications   Medication Sig Dispense Refill    acetaminophen (TYLENOL) 500 MG tablet Take 1,000 mg by mouth every 8 hours as needed for mild pain or fever       acetylcysteine (MUCOMYST) 10 % " nebulizer solution Inhale 4 mLs into the lungs every 4 hours as needed for mucolysis/respiratory distress      albuterol (PROAIR HFA/PROVENTIL HFA/VENTOLIN HFA) 108 (90 Base) MCG/ACT inhaler Inhale 2 puffs into the lungs every 6 hours as needed for shortness of breath, wheezing or cough      Azelastine HCl 137 MCG/SPRAY SOLN Spray 1 spray into both nostrils 2 times daily as needed for rhinitis      benzonatate (TESSALON PERLES) 100 MG capsule Take 1 capsule (100 mg) by mouth 3 times daily as needed for cough 90 capsule 3    bumetanide (BUMEX) 1 MG tablet Take 1 tablet (1 mg) by mouth 2 times daily 60 tablet 0    cetirizine (ZYRTEC) 5 MG tablet Take 1 tablet (5 mg) by mouth daily as needed for allergies      chlorhexidine (PERIDEX) 0.12 % solution [CHLORHEXIDINE (PERIDEX) 0.12 % SOLUTION] Apply 15 mL to the mouth or throat at bedtime as needed.       escitalopram (LEXAPRO) 10 MG tablet Take 1 tablet (10 mg) by mouth daily 90 tablet 3    famotidine (PEPCID) 20 MG tablet Take 1 tablet (20 mg) by mouth every other day 90 tablet 0    fluticasone (FLONASE) 50 MCG/ACT nasal spray Spray 1 spray into both nostrils daily as needed for rhinitis or allergies      Fluticasone-Umeclidin-Vilanterol (TRELEGY ELLIPTA) 200-62.5-25 MCG/INH oral inhaler Inhale 1 puff into the lungs At Bedtime      ipratropium (ATROVENT HFA) 17 MCG/ACT inhaler Inhale 2 puffs into the lungs every 6 hours as needed for wheezing      ipratropium - albuterol 0.5 mg/2.5 mg/3 mL (DUONEB) 0.5-2.5 (3) MG/3ML neb solution Take 1 vial by nebulization every 6 hours as needed for shortness of breath, wheezing or cough      Melatonin 5 MG TBDP Take 15 mg by mouth At Bedtime      metoprolol succinate ER (TOPROL XL) 50 MG 24 hr tablet Take 1 tablet (50 mg) by mouth At Bedtime 30 tablet 0    mirabegron (MYRBETRIQ) 25 MG 24 hr tablet Take 1 tablet (25 mg) by mouth daily 90 tablet 2    nystatin (MYCOSTATIN) 498055 UNIT/GM external cream Apply topically 3 times daily as  needed for dry skin      OLANZapine (ZYPREXA) 5 MG tablet Take 1 tablet (5 mg) by mouth At Bedtime 30 tablet 3    prochlorperazine (COMPAZINE) 10 MG tablet Take 1 tablet (10 mg) by mouth every 6 hours as needed (Nausea/Vomiting) 30 tablet 5    rivaroxaban ANTICOAGULANT (XARELTO) 15 MG TABS tablet Take 1 tablet (15 mg) by mouth At Bedtime      simvastatin (ZOCOR) 40 MG tablet Take 1 tablet (40 mg) by mouth At Bedtime 90 tablet 3    sodium chloride 0.9 % neb solution Take 3 mLs by nebulization every 3 hours as needed for wheezing      [START ON 10/26/2023] sotorasib (LUMAKRAS) 120 MG tablet Take 8 tablets (960 mg) by mouth daily for 30 days Do not chew, crush or split tablets. 240 tablet 0    spironolactone (ALDACTONE) 25 MG tablet Take 1 tablet (25 mg) by mouth daily 30 tablet 0    tamoxifen (NOLVADEX) 20 MG tablet Take 1 tablet (20 mg) by mouth daily 90 tablet 3    traMADol (ULTRAM) 50 MG tablet Take 1 tablet (50 mg) by mouth 2 times daily as needed for severe pain 60 tablet 0    zinc oxide (DESITIN) 40 % external ointment Apply topically as needed for dry skin or irritation (Twice a day to gluteal area skin irritation) 113 g 1     Allergies   Allergen Reactions    Sulfa (Sulfonamide Antibiotics) [Sulfa Antibiotics] Rash     Headaches and dizziness.    Homeopathic Drugs [External Allergen Needs Reconciliation - See Comment] Unknown     runny nose    Mold Extracts [Molds & Smuts] Unknown    Morphine (Pf) [Morphine] Unknown     hallucinate    Lisinopril Itching, Cough and Unknown     cough    Sulfacetamide Sodium [Sulfacetamide] Rash     Immunization History   Administered Date(s) Administered    COVID-19 Bivalent 12+ (Pfizer) 03/02/2021, 12/08/2021, 11/16/2022    COVID-19 MONOVALENT 12+ (Pfizer) 03/02/2021, 03/23/2021, 12/08/2021    COVID-19 Monovalent 18+ (Moderna) 03/02/2021    FLU 6-35 months 11/03/2003    Flu 65+ Years 09/20/2017, 09/25/2018, 10/10/2019    Flu, Unspecified 10/01/2016, 10/01/2018    S8t1-25 Novel  "Flu 01/05/2010    Influenza (H1N1) 01/05/2010    Influenza (High Dose) 3 valent vaccine 10/30/2014, 10/01/2015, 11/26/2016, 09/25/2018, 10/10/2019, 10/19/2020, 11/16/2022    Influenza (IIV3) PF 11/03/2003, 11/08/2006, 10/24/2007, 11/28/2008, 10/09/2009, 10/22/2010, 01/26/2012, 10/03/2012, 11/01/2013    Influenza Vaccine 65+ (Fluzone HD) 09/20/2017, 10/19/2020, 12/15/2020, 11/16/2022    Mantoux Tuberculin Skin Test 12/13/2007, 10/24/2020, 11/03/2020    Pneumo Conj 13-V (2010&after) 05/11/2015    Pneumococcal 23 valent 11/24/1997, 11/21/2007, 05/17/2017    TD,PF 7+ (Tenivac) 07/20/2004    TDAP (Adacel,Boostrix) 02/12/2016    TDAP Vaccine (Boostrix) 02/12/2016    Zoster recombinant adjuvanted (SHINGRIX) 07/31/2020    Zoster vaccine, live 02/18/2009, 10/20/2014, 07/31/2020         Review of Systems   Patient denies fever, chills, headache, lightheadedness, dizziness, rhinorrhea, cough, congestion,  palpitations, abdominal pain, n/v, diarrhea, constipation, change in appetite, change in sleep pattern, dysuria, frequency, burning or pain with urination.  Other than stated in HPI all other review of systems is negative.       Physical Exam  Vital signs:/72   Pulse 75   Temp 97.9  F (36.6  C)   Resp 18   Ht 1.575 m (5' 2\")   Wt 49.4 kg (109 lb)   SpO2 95%   BMI 19.94 kg/m     GENERAL APPEARANCE: thin, frail elderly female,  in no acute distress.  HEENT: normocephalic, atraumatic  sclerae anicteric, conjunctivae clear and moist, EOM intact.  Hard lymph node of sternum  LUNGS: diminished bases, respiratory effort normal. O2 3L   CARD: RRR, S1, S2, without murmurs, gallops, rubs  ABD: Soft, nondistended and nontender with normal bowel sounds.   MSK: Muscle strength and tone were equal bilaterally. Moves all extremities easily and intentionally.   EXTREMITIES: No cyanosis, clubbing or edema.  NEURO: Alert and oriented x 3. Face is symmetric.  SKIN:  right mid thoracic raised mass with irregular edges 2cm x 4cm, " affixed, nonpainful.  Ecchymosis of mass. No surrounding s/s of hematoma, no edema or eyrthema.   PSYCH: euthymic        Labs:    Last Comprehensive Metabolic Panel:  Lab Results   Component Value Date     10/10/2023    POTASSIUM 4.6 10/10/2023    CHLORIDE 104 10/10/2023    CO2 25 10/10/2023    ANIONGAP 12 10/10/2023    GLC 70 10/10/2023    BUN 27.0 (H) 10/10/2023    CR 1.37 (H) 10/10/2023    GFRESTIMATED 39 (L) 10/10/2023    MESSI 8.7 (L) 10/10/2023       Lab Results   Component Value Date    WBC 15.7 10/05/2023     Lab Results   Component Value Date    RBC 3.54 10/05/2023     Lab Results   Component Value Date    HGB 10.1 10/05/2023     Lab Results   Component Value Date    HCT 32.2 10/05/2023     Lab Results   Component Value Date    MCV 91 10/05/2023     Lab Results   Component Value Date    MCH 28.5 10/05/2023     Lab Results   Component Value Date    MCHC 31.4 10/05/2023     Lab Results   Component Value Date    RDW 15.4 10/05/2023     Lab Results   Component Value Date     10/05/2023           Assessment/plan:   Mass on back  Lipoma vs mass, US to help determine next steps.  I did explain to the patient that I could not know if this was related to her cancer and would advise her to make a follow up appointment with oncology. I did call daughter and explained the plan of care.  Daughter requesting this be done emergently as she feels it is detrimental to the patient's mental health.  I explained that she did not have pain or s/s of bleeding and so could not make it an emergency.     Lymphadenopathy  Recommended patient follow up with oncology for further dx and explanation.     Malignant neoplasm of upper lobe of right lung (H)  Adrenal Mass  Goals of care, counseling/discussion  Again, I discussed with patient the need to follow up with oncology for questions regarding her prognosis.  Facility recommending 24 hour care at discharge as she continues to decompensate with any exertion.  Daughter would  "like any results call to her first to help with \"breaking the news\" to the patient.  Apparently, the patient does not know how \"sick\" she is and family plans to update her on her clinical status after her 's  this weekend.  Family seems to be struggling with current losses.  I did tell the daughter that I empathized with the current situation and would recommend continuing goals of care conversations with her mom.  Patient continues with sotorasib.      Pulmonary emphysema, unspecified emphysema type (H)  COPD exacerbation (H)  Respiratory status improved to new baseline.  3L at rest.  Continue with inhalers.     Acute on chronic diastolic congestive heart failure (H)  Compensated, continue with spironolactone and Bumex.     Factor 5 Leiden mutation, heterozygous (H24)  Noted, continue with home Xarelto.     Gastroesophageal reflux disease with esophagitis without hemorrhage  Continue with TUMS and famotidine.     45 total minutes spent with patient, therapy, nursing, SW and daughter in coordinating above plan of care and goals of care discussions.     Electronically signed by: Rayna Carbajal NP    "

## 2023-10-19 NOTE — ORAL ONC MGMT
Oral Chemotherapy Monitoring Program    Subjective/Objective:  Irene León is a 78 year old female contacted by phone for a follow-up visit for oral chemotherapy.  Stephanie says she is doing well on the increased dose of sotorasib and denies any side effects.         8/31/2023    11:00 AM 9/7/2023    11:00 AM 9/12/2023    11:00 AM 10/19/2023    10:00 AM   ORAL CHEMOTHERAPY   Assessment Type Initial Work up;New [place]ach Initial Follow up Other Monthly Follow up   Diagnosis Code Non-Small Cell Lung Cancer Non-Small Cell Lung Cancer Non-Small Cell Lung Cancer Non-Small Cell Lung Cancer   Providers Dr. Daniela Suarez   Clinic Name/Location [facility]   Drug Name Lumakras (sotorasib) Lumakras (sotorasib) Lumakras (sotorasib) Lumakras (sotorasib)   Dose 960 mg 960 mg 960 mg 960 mg   Current Schedule Daily Daily Daily Daily   Cycle Details Continuous Continuous Continuous Continuous   Start Date of Last Cycle  9/1/2023  10/12/2023   Doses missed in last 2 weeks  0  0   Adherence Assessment  Adherent  Adherent   Adverse Effects  Diarrhea No AE identified during assessment No AE identified during assessment   Diarrhea  Grade 2     Pharmacist Intervention(diarrhea)  Yes     Intervention(s)  Patient education;OTC recommendation     Any new drug interactions? Yes No  No   Pharmacist Intervention? Yes      Intervention(s) Patient Education      Is the dose as ordered appropriate for the patient? Yes Yes  Yes       Last PHQ-2 Score on record:       6/30/2022    11:24 AM 2/14/2022    10:52 AM   PHQ-2 ( 1999 Pf[employer])   Q1: Little interest or pleasure in doing things 0 0   Q2: Feeling down, depressed or hopeless 0 0   PHQ-2 Score 0 0       Vitals:  BP:   BP Readings from Last 1 Encounters:   10/19/23 137/72     Wt Readings from Last 1 Encounters:   10/19/23 49.4 kg (109 lb)     Estimated body surface area is 1.47 meters squared as calculated from the following:    Height as of an  "earlier encounter on 10/19/23: 1.575 m (5' 2\").    Weight as of an earlier encounter on 10/19/23: 49.4 kg (109 lb).    Labs:  _  Result Component Current Result Ref Range   Sodium 141 (10/10/2023) 135 - 145 mmol/L     _  Result Component Current Result Ref Range   Potassium 4.6 (10/10/2023) 3.4 - 5.3 mmol/L     _  Result Component Current Result Ref Range   Calcium 8.7 (L) (10/10/2023) 8.8 - 10.2 mg/dL     _  Result Component Current Result Ref Range   Magnesium 2.6 (H) (10/10/2023) 1.7 - 2.3 mg/dL     No results found for Phos within last 30 days.     _  Result Component Current Result Ref Range   Albumin 2.9 (L) (10/10/2023) 3.5 - 5.2 g/dL     _  Result Component Current Result Ref Range   Urea Nitrogen 27.0 (H) (10/10/2023) 8.0 - 23.0 mg/dL     _  Result Component Current Result Ref Range   Creatinine 1.37 (H) (10/10/2023) 0.51 - 0.95 mg/dL     _  Result Component Current Result Ref Range   AST 44 (10/10/2023) 0 - 45 U/L     _  Result Component Current Result Ref Range   ALT 6 (10/10/2023) 0 - 50 U/L     _  Result Component Current Result Ref Range   Bilirubin Total 0.3 (10/10/2023) <=1.2 mg/dL     _  Result Component Current Result Ref Range   WBC Count 13.7 (H) (10/10/2023) 4.0 - 11.0 10e3/uL     _  Result Component Current Result Ref Range   Hemoglobin 10.2 (L) (10/10/2023) 11.7 - 15.7 g/dL     _  Result Component Current Result Ref Range   Platelet Count 244 (10/10/2023) 150 - 450 10e3/uL     _  Result Component Current Result Ref Range   Absolute Neutrophils 18.6 (H) (9/29/2023) 1.6 - 8.3 10e3/uL     _  Result Component Current Result Ref Range   Absolute Neutrophils 9.0 (H) (10/2/2023) 1.6 - 8.3 10e3/uL          Assessment/Plan:  Stephanie is tolerating the sotorasib therapy well.  Continue as prescribed.    Follow-Up:  Will plan to review next appt on 10/26 and then next follow up assessment call in about a month. Stephanie agrees to the plan.     Refill Due:  ~11/9/23    Emir Durant, PharmD, BCOP  Clinical " Oncology Pharmacist  October 19, 2023

## 2023-10-20 NOTE — TELEPHONE ENCOUNTER
[facility] Geriatrics Triage Nurse Telephone Encounter    Provider: GERA Galeana  Facility: [facility]  Facility Type:  TCU    Caller: Goldie  Call Back Number: [phone]    Allergies:    Allergies   Allergen Reactions    Sulfa (Sulfonamide Antibiotics) [Sulfa Antibiotics] Rash     Headaches and dizziness.    Homeopathic Drugs [External Allergen Needs Reconciliation - See Comment] Unknown     runny nose    Mold Extracts [Molds & Smuts] Unknown    Morphine (Pf) [Morphine] Unknown     hallucinate    Lisinopril Itching, Cough and Unknown     cough    Sulfacetamide Sodium [Sulfacetamide] Rash        Reason for call: Nurse is calling to report ultrasound results.  See results below:          Verbal Order/Direction given by Provider: Nursing needs to update the patient's daughter to let her know that the lump is either a lipoma or a solid mass which would need further imaging and follow up with oncology.      Provider giving Order:  GERA Galeana    Verbal Order given to: Pat Gabriel RN

## 2023-10-24 NOTE — TELEPHONE ENCOUNTER
[facility] Geriatrics Lab Note     Provider: GERA Galeana  Facility: [facility]  Facility Type:  TCU    Allergies   Allergen Reactions    Sulfa (Sulfonamide Antibiotics) [Sulfa Antibiotics] Rash     Headaches and dizziness.    Homeopathic Drugs [External Allergen Needs Reconciliation - See Comment] Unknown     runny nose    Mold Extracts [Molds & Smuts] Unknown    Morphine (Pf) [Morphine] Unknown     hallucinate    Lisinopril Itching, Cough and Unknown     cough    Sulfacetamide Sodium [Sulfacetamide] Rash       Labs Reviewed by provider: Heme 1, CMP     Verbal Order/Direction given by Provider: Hold Spironolactone and Bumex until further notice.  Check BMP on 10/27/23.     Provider giving Order:  GERA Galeana    Verbal Order given to: [facility]([phone])    Biju Gabriel RN

## 2023-10-24 NOTE — TELEPHONE ENCOUNTER
Roya from [facility] calls regarding patient's appointment with Dr. Suarez tomorrow. Patient has a growth/mass on her back. Daughter is wondering if Dr. Suarez will need any pictures or will just view with video on the call. If any further pictures are needed, Roya requests a call back and will see if they can accommodate.    Reviewed with Dr. Suarez. She would like additional pictures if staff is able to send them to her.     Return call placed to Roya, there was no answer. Message is left for her to call triage RN.    Bianca Coates RN

## 2023-10-25 PROBLEM — C34.90 MALIGNANT NEOPLASM OF LUNG, UNSPECIFIED LATERALITY, UNSPECIFIED PART OF LUNG (H): Status: ACTIVE | Noted: 2023-01-01

## 2023-10-25 PROBLEM — J44.1 COPD WITH ACUTE EXACERBATION (H): Status: ACTIVE | Noted: 2023-01-01

## 2023-10-25 NOTE — ED PROVIDER NOTES
EMERGENCY DEPARTMENT ENCOUNTER      NAME: Irene León  AGE: 78 year old female  YOB: 1945  MRN: [id number]  EVALUATION DATE & TIME: 10/25/2023  4:38 PM    PCP: Pankaj Montanez    ED PROVIDER: Riky Lemus M.D.      Chief Complaint   Patient presents with    Shortness of Breath         FINAL IMPRESSION:  1. Acute on chronic respiratory failure with hypoxia (H)    2. Malignant neoplasm of lung, unspecified laterality, unspecified part of lung (H)    3. COPD with acute exacerbation (H)          ED COURSE & MEDICAL DECISION MAKIN year old female presents to the Emergency Department for evaluation of shortness of breath.  Patient  with a history of lung cancer, COPD, congestive heart failure.  Presenting from a facility with dyspnea and cough.  She is in moderate respiratory distress on arrival, also appears very anxious.  Also moderately hypotensive initially.  She has expiratory wheezing and diminished air movement throughout.  We were able to titrate down her oxygen to about 6 L via oxime mask, which is above her normal but improved after nebulizers.  She was given immediate Solu-Medrol.  Work-up was pursued as below.  She was given some IV fluids cautiously given her history of CHF which eventually resulted in improvement in her hypotension.  Labs were notable for leukocytosis but normal lactic acid level.  CT of the chest was completed showing worsening metastatic disease but otherwise no acute inflammatory process.  With the leukocytosis and hypotension, patient was started empirically on Zosyn with greatest concern for a lower respiratory infection.  She had some improvement here, never required noninvasive ventilation.  Her blood pressure improved after IV fluids however given tenuous volume status and respiratory status, did elect to place a PICC line in the event of any deterioration requiring vasopressor support.  She later complained of more dyspnea but seem to be very anxious  with overall improvement in lung sounds and oximetry.  Discussed and updated patient and family.  Discussed patient with resident service for admission.      4:41 PM I met with the patient and performed the physical exam.   9:54 PM I spoke to Dr. Guzmán, hospitalist.  10:28 PM I spoke to the Hospitalist resident.  At the conclusion of the encounter I discussed the results of all of the tests and the disposition. The questions were answered. The patient or family acknowledged understanding and was agreeable with the care plan.     Critical Care  Performed by: Riky Lemus MD  Authorized by: Riky Lemus MD     Total critical care time: 50 minutes  Critical care time was exclusive of separately billable procedures and treating other patients.  Critical care was necessary to treat or prevent imminent or life-threatening deterioration of the following conditions: Respiratory failure requiring oxy mask supplemental oxygen delivery greater than 6 L at times, frequent nebulizer treatments, hypotension requiring central access and consideration of vasopressor support  Critical care was time spent personally by me on the following activities: development of treatment plan with patient or surrogate, discussions with consultants, examination of patient, evaluation of patient's response to treatment, obtaining history from patient or surrogate, ordering and performing treatments and interventions, ordering and review of laboratory studies, ordering and review of radiographic studies and re-evaluation of patient's condition, this excludes any separately billable procedures.        MEDICATIONS GIVEN IN THE EMERGENCY:  Medications   lidocaine 1 % 0.1-5 mL (1.5 mLs Other $Given 10/25/23 2025)   lidocaine (LMX4) cream (has no administration in time range)   piperacillin-tazobactam (ZOSYN) 3.375 g vial to attach to  mL bag (has no administration in time range)   norepinephrine (LEVOPHED) 4 mg in  mL infusion PREMIX  (0 mcg/kg/min × 49.4 kg Intravenous Hold 10/25/23 2019)   sodium chloride (PF) 0.9% PF flush 10-20 mL (has no administration in time range)   sodium chloride (PF) 0.9% PF flush 10-40 mL (0 mLs Intracatheter Hold 10/25/23 2134)   sodium chloride (PF) 0.9% PF flush 10-40 mL (has no administration in time range)   ipratropium - albuterol 0.5 mg/2.5 mg/3 mL (DUONEB) neb solution 3 mL (3 mLs Nebulization $Given 10/25/23 1647)   albuterol (PROVENTIL) neb solution 5 mg (5 mg Nebulization $Given 10/25/23 1650)   methylPREDNISolone sodium succinate (solu-MEDROL) injection 125 mg (125 mg Intravenous $Given 10/25/23 1704)   magnesium sulfate 2 g in 50 mL sterile water intermittent infusion (0 g Intravenous Stopped 10/25/23 1735)   sodium chloride 0.9% BOLUS 500 mL (0 mLs Intravenous Stopped 10/25/23 1825)   sodium chloride (PF) 0.9% PF flush 10-40 mL (10 mLs Intracatheter $Given 10/25/23 2041)   sodium chloride 0.9% BOLUS 500 mL (0 mLs Intravenous Stopped 10/25/23 2046)   acetaminophen (TYLENOL) tablet 650 mg (650 mg Oral $Given 10/25/23 1943)   traMADol (ULTRAM) tablet 50 mg (50 mg Oral $Given 10/25/23 1943)   piperacillin-tazobactam (ZOSYN) 3.375 g vial to attach to  mL bag (0 g Intravenous Stopped 10/25/23 2244)   LORazepam (ATIVAN) injection 1 mg (1 mg Intravenous $Given 10/25/23 2158)   ipratropium - albuterol 0.5 mg/2.5 mg/3 mL (DUONEB) neb solution 3 mL (3 mLs Nebulization $Given 10/25/23 2207)       NEW PRESCRIPTIONS STARTED AT TODAY'S ER VISIT  New Prescriptions    No medications on file          =================================================================    HPI    Patient information was obtained from: Patient    Use of : N/A         Irene León is a 78 year old female with a pertinent history of COPD, HTN, CKD 4, systolic and diastolic CHF, PAD, breast and lung cancer, and hyperlipidemia,  who presents to this ED via EMS for evaluation of shortness of breath.    Patient reports shortness  of breath over the past couple days with increased coughing and with phlegm sometimes. She notes some chest tightness but no chest pain. She was sent in today by her assisted living staff due to requiring more at home oxygen than normal. Patient notes a history of COPD, lung cancer, and CHF. Otherwise, patient denies any leg swelling or leg pain. No other complaints at this time.       REVIEW OF SYSTEMS   All systems reviewed and negative except as noted in HPI.    PAST MEDICAL HISTORY:  Past Medical History:   Diagnosis Date    ANATOLIY (acute kidney injury) (H24)     Allergic rhinitis     Arrhythmia     Atrial fibrillation with RVR (H)     Bacteremia     Breast cancer (H) 2017    Cardiomyopathy (H)     Centrilobular emphysema (H)     CHF (congestive heart failure) (H)     Chronic kidney disease     CKD (chronic kidney disease)     COPD (chronic obstructive pulmonary disease) (H)     Coronary artery disease     Depression     Dysphagia     E. coli sepsis (H)     Factor 5 Leiden mutation, heterozygous (H24)     GERD (gastroesophageal reflux disease)     Heart failure with reduced ejection fraction (H) 04/17/2023    Hx of radiation therapy 2017    Hyperlipidemia     Hypertension     Hypokalemia     Hypomagnesemia     Idiopathic cardiomyopathy (H)     Left hip pain 04/30/2014    OAB (overactive bladder)     Osteoporosis     Sacral insufficiency fracture     Sinusitis     Syncope     Urge incontinence     Vocal cord dysfunction        PAST SURGICAL HISTORY:  Past Surgical History:   Procedure Laterality Date    ARTHROPLASTY REVISION HIP Left     BIOPSY BREAST Right 2017    BLADDER SURGERY      bladder interstim with removal    CARDIAC CATHETERIZATION      CATARACT EXTRACTION Left 07/18/2017    IR ABDOMINAL AORTOGRAM  4/16/2003    IR AORTIC ARCH 4 VESSEL ANGIOGRAM  4/16/2003    IR MISCELLANEOUS PROCEDURE  4/16/2003    LUMPECTOMY BREAST Left 06/2017    x2    PICC DOUBLE LUMEN PLACEMENT  4/11/2022         PICC TRIPLE LUMEN  PLACEMENT  4/7/2022         PICC TRIPLE LUMEN PLACEMENT  10/25/2023    [facility] OPEN TX FEMORAL FRACTURE DISTAL MED/LAT CONDYLE Left 10/28/2015    Procedure: OPEN REDUCTION INTERNAL FIXATION LEFT  PROXIMAL FEMUR PERIPROSTHETIC FRACTURE;  Surgeon: Yovanny Albarran MD;  Location: [facility];  Service: Orthopedics           CURRENT MEDICATIONS:    Current Facility-Administered Medications   Medication    lidocaine (LMX4) cream    lidocaine 1 % 0.1-5 mL    norepinephrine (LEVOPHED) 4 mg in  mL infusion PREMIX    [START ON 10/26/2023] piperacillin-tazobactam (ZOSYN) 3.375 g vial to attach to  mL bag    sodium chloride (PF) 0.9% PF flush 10-20 mL    sodium chloride (PF) 0.9% PF flush 10-40 mL    sodium chloride (PF) 0.9% PF flush 10-40 mL     Current Outpatient Medications   Medication    acetaminophen (TYLENOL) 500 MG tablet    acetylcysteine (MUCOMYST) 10 % nebulizer solution    albuterol (PROAIR HFA/PROVENTIL HFA/VENTOLIN HFA) 108 (90 Base) MCG/ACT inhaler    Azelastine HCl 137 MCG/SPRAY SOLN    benzonatate (TESSALON PERLES) 100 MG capsule    bumetanide (BUMEX) 1 MG tablet    cetirizine (ZYRTEC) 5 MG tablet    chlorhexidine (PERIDEX) 0.12 % solution    escitalopram (LEXAPRO) 10 MG tablet    famotidine (PEPCID) 20 MG tablet    fluticasone (FLONASE) 50 MCG/ACT nasal spray    Fluticasone-Umeclidin-Vilanterol (TRELEGY ELLIPTA) 200-62.5-25 MCG/INH oral inhaler    ipratropium (ATROVENT HFA) 17 MCG/ACT inhaler    ipratropium - albuterol 0.5 mg/2.5 mg/3 mL (DUONEB) 0.5-2.5 (3) MG/3ML neb solution    Melatonin 5 MG TBDP    metoprolol succinate ER (TOPROL XL) 50 MG 24 hr tablet    mirabegron (MYRBETRIQ) 25 MG 24 hr tablet    nystatin (MYCOSTATIN) 318221 UNIT/GM external cream    OLANZapine (ZYPREXA) 5 MG tablet    prochlorperazine (COMPAZINE) 10 MG tablet    rivaroxaban ANTICOAGULANT (XARELTO) 15 MG TABS tablet    simvastatin (ZOCOR) 40 MG tablet    sodium chloride 0.9 % neb solution    [START ON 10/26/2023]  sotorasib (LUMAKRAS) 120 MG tablet    spironolactone (ALDACTONE) 25 MG tablet    tamoxifen (NOLVADEX) 20 MG tablet    traMADol (ULTRAM) 50 MG tablet    zinc oxide (DESITIN) 40 % external ointment         ALLERGIES:  Allergies   Allergen Reactions    Sulfa (Sulfonamide Antibiotics) [Sulfa Antibiotics] Rash     Headaches and dizziness.    Homeopathic Drugs [External Allergen Needs Reconciliation - See Comment] Unknown     runny nose    Mold Extracts [Molds & Smuts] Unknown    Morphine (Pf) [Morphine] Unknown     hallucinate    Lisinopril Itching, Cough and Unknown     cough    Sulfacetamide Sodium [Sulfacetamide] Rash       FAMILY HISTORY:  Family History   Problem Relation Age of Onset    Osteoporosis Other     Prostate Cancer Brother     Breast Cancer Maternal Aunt         age thought to be in her 70's-80's    Prostate Cancer Maternal Uncle        SOCIAL HISTORY:   Social History     Socioeconomic History    Marital status:    Tobacco Use    Smoking status: Former     Types: Cigarettes     Quit date: 10/28/2007     Years since quittin.0     Passive exposure: Past    Smokeless tobacco: Former     Quit date: 2004   Vaping Use    Vaping Use: Former   Substance and Sexual Activity    Alcohol use: No    Drug use: No   Social History Narrative     and lives in home with 3 flight of steps       VITALS:  BP 99/53   Pulse 83   Temp 98.6  F (37  C) (Temporal)   Resp 22   SpO2 96%     PHYSICAL EXAM    Constitutional: Chronically ill-appearing elderly female patient, sitting in bed, moderate respiratory distress  HENT: Normocephalic, Atraumatic. Neck Supple.  Eyes: EOMI, Conjunctiva normal.  Respiratory: Lung sounds are diminished throughout with scattered expiratory wheezing in all lung fields.  Tachypneic and in moderate respiratory distress, speaking shorter sentences.  Cardiovascular: Normal heart rate, Regular rhythm. No peripheral edema.  Abdomen: Soft, nontender.  Musculoskeletal: Good range of  motion in all major joints. No major deformities noted.  Integument: Warm, Dry.  Neurologic: Alert & awake, Normal motor function, Normal sensory function, No focal deficits noted.   Psychiatric: Cooperative. Affect appropriate.     LAB:  All pertinent labs reviewed and interpreted.  Labs Ordered and Resulted from Time of ED Arrival to Time of ED Departure   BASIC METABOLIC PANEL - Abnormal       Result Value    Sodium 139      Potassium 3.8      Chloride 102      Carbon Dioxide (CO2) 28      Anion Gap 9      Urea Nitrogen 17.5      Creatinine 1.26 (*)     GFR Estimate 43 (*)     Calcium 9.5      Glucose 93     TROPONIN T, HIGH SENSITIVITY - Abnormal    Troponin T, High Sensitivity 31 (*)    N TERMINAL PRO BNP OUTPATIENT - Abnormal    N Terminal Pro BNP Outpatient 1,845 (*)    BLOOD GAS VENOUS - Abnormal    pH Venous 7.40      pCO2 Venous 51 (*)     pO2 Venous 21 (*)     Bicarbonate Venous 32 (*)     Base Excess/Deficit 6.7      Oxyhemoglobin Venous 29.9 (*)     O2 Sat, Venous 30.3 (*)    PROCALCITONIN - Abnormal    Procalcitonin 0.11 (*)    CBC WITH PLATELETS AND DIFFERENTIAL - Abnormal    WBC Count 15.0 (*)     RBC Count 3.71 (*)     Hemoglobin 10.6 (*)     Hematocrit 34.6 (*)     MCV 93      MCH 28.6      MCHC 30.6 (*)     RDW 14.4      Platelet Count 205      % Neutrophils 74      % Lymphocytes 11      % Monocytes 11      % Eosinophils 3      % Basophils 0      % Immature Granulocytes 1      NRBCs per 100 WBC 0      Absolute Neutrophils 11.2 (*)     Absolute Lymphocytes 1.6      Absolute Monocytes 1.6 (*)     Absolute Eosinophils 0.4      Absolute Basophils 0.1      Absolute Immature Granulocytes 0.1      Absolute NRBCs 0.0     ROUTINE UA WITH MICROSCOPIC REFLEX TO CULTURE - Abnormal    Color Urine Yellow      Appearance Urine Clear      Glucose Urine Negative      Bilirubin Urine Negative      Ketones Urine Trace (*)     Specific Gravity Urine 1.022      Blood Urine Negative      pH Urine 6.5      Protein  Albumin Urine 10 (*)     Urobilinogen Urine <2.0      Nitrite Urine Negative      Leukocyte Esterase Urine Negative      RBC Urine 1      WBC Urine 1      Squamous Epithelials Urine <1     INFLUENZA A/B, RSV, & SARS-COV2 PCR - Normal    Influenza A PCR Negative      Influenza B PCR Negative      RSV PCR Negative      SARS CoV2 PCR Negative     INR - Normal    INR 1.15     HEPATIC FUNCTION PANEL - Normal    Protein Total 7.7      Albumin 3.5      Bilirubin Total 0.3      Alkaline Phosphatase 54      AST 27      ALT <5      Bilirubin Direct <0.20     LACTIC ACID WHOLE BLOOD - Normal    Lactic Acid 1.2     BLOOD CULTURE   BLOOD CULTURE       RADIOLOGY:  Reviewed all pertinent imaging. Please see official radiology report.  XR Chest Port 1 View   Final Result   IMPRESSION: Right upper extremity PICC tip in the mid SVC. Grossly unchanged emphysema, pulmonary nodules, right hilar adenopathy.      Chest CT w/o contrast   Final Result   IMPRESSION:   1.  Worsening metastatic disease with increased size of multiple pulmonary nodules and thoracic lymph nodes compared to 9/28/2023.      2.  Numerous retroperitoneal/perinephric nodules in the visualized upper abdomen appear to be new compared to 9/29/2023 and also worrisome for metastatic disease. Bilateral adrenal nodules and right adrenal hemorrhage were better characterized on prior    contrast-enhanced CT.         XR Chest Port 1 View   Final Result   IMPRESSION: Heart size within normal limits. Enlarged right hilum representing adenopathy as seen on CT. Pulmonary vascularity normal. Mid right lung nodule, better appreciated on CT. Subsegmental atelectasis or scarring in the bases. No acute    infiltrates or effusions.          EKG:    Performed at: 1648    Impression: Sinus rhythm with first-degree AV block, left axis deviation, nonspecific ST-T wave abnormality    Rate: 85  Rhythm: Sinus  Axis: Leftward  ID Interval: 262  QRS Interval: 122  QTc Interval: 504  ST Changes:  Nonspecific ST-T wave abnormality  Comparison: No significant change from September 30, 2023    I have independently reviewed and interpreted the EKG(s) documented above.            I, Marti Park, am serving as a scribe to document services personally performed by Dr. Riky Lemus, based on my observation and the provider's statements to me. I, Riky Lemus MD attest that Marti Park is acting in a scribe capacity, has observed my performance of the services and has documented them in accordance with my direction.    Riky Lemus M.D.  Emergency Medicine  [facility] EMERGENCY ROOM  Counts include 234 beds at the [facility] [address]  [phone]  Dept: [phone]       Riky Lemus MD  10/25/23 2210

## 2023-10-25 NOTE — ED TRIAGE NOTES
Pt arrives to ED via EMS with c/o shortness of breath that got worse today. Pt has copd, lung cancer, and asthma. Pt living at a nursing facility and normally on 3L of nasal cannula and acquiring more oxygen. Facility had to up her oxygen to 6L and saturation was only at 81%. When ems arrived she was on 86%. Pt was dx with pneumonia earlier this month. Ems applied 10L on non breather and pt at 100%. Labored breathing. Course wet cough. Provider and RT in room upon arrival.      Triage Assessment (Adult)       Row Name 10/25/23 1639          Triage Assessment    Airway WDL X     Additional Documentation Breath Sounds (Group)        Respiratory WDL    Respiratory WDL X;rhythm/pattern     Rhythm/Pattern, Respiratory dyspnea upon exertion;shortness of breath        Breath Sounds    Breath Sounds All Fields     All Lung Fields Breath Sounds crackles;diminished        Skin Circulation/Temperature WDL    Skin Circulation/Temperature WDL WDL        Cardiac WDL    Cardiac WDL WDL        Peripheral/Neurovascular WDL    Peripheral Neurovascular WDL WDL        Cognitive/Neuro/Behavioral WDL    Cognitive/Neuro/Behavioral WDL WDL

## 2023-10-25 NOTE — TELEPHONE ENCOUNTER
Unable to reach Roya and there was no return call on messages left. Patient has appointment today. Will address if further pictures are needed after appointment.    Bianca Coates RN

## 2023-10-25 NOTE — PHARMACY-ADMISSION MEDICATION HISTORY
Pharmacist Admission Medication History    Admission medication history is complete. The information provided in this note is only as accurate as the sources available at the time of the update.    Information Source(s): Patient, Family member, Clinic records, Hospital records, and Facility (TCU/NH/) medication list/MAR via in-person and phone    Pertinent Information:     Spoke with staff at [facility].   -Bumex and spironolactone were placed on hold starting yesterday evening 10/24 pm due to worsening kidney function, plan to hold until recheck of BMP on 10/27/23.   -Sotorasib 120 mg tablets, 6 tabs at bedtime - however, discharge records 10/5/23 and oncology records 10/19/23 have the dose listed as 8 tabs (960mg) daily. Mentioned to patient and family to follow up on discharge to make sure she is receiving 8 tabs as prescribed. Patient says she is supposed to take 8 tabs.     Changes made to PTA medication list:  Added: None  Deleted: None  Changed: tramadol TID prn, not BID prn; added indication of gingivitis for chlorhexidine solution    Family will obtain sotorasib (Lumakras) from [facility] and bring to [facility] if admitted. Patient has not had her dose tonight 10/25/23 yet.     Allergies reviewed with med list from U and updates made in EHR: yes    Medication History Completed By: Ana Sanchez [facility] 10/25/2023 6:43 PM    PTA Med List   Medication Sig Note Last Dose    acetaminophen (TYLENOL) 500 MG tablet Take 1,000 mg by mouth every 8 hours as needed for mild pain or fever   Unknown    acetylcysteine (MUCOMYST) 10 % nebulizer solution Inhale 4 mLs into the lungs every 4 hours as needed for mucolysis/respiratory distress  Unknown    albuterol (PROAIR HFA/PROVENTIL HFA/VENTOLIN HFA) 108 (90 Base) MCG/ACT inhaler Inhale 2 puffs into the lungs every 6 hours as needed for shortness of breath, wheezing or cough  Unknown    Azelastine HCl 137 MCG/SPRAY SOLN Spray 1 spray into  both nostrils 2 times daily as needed for rhinitis  Unknown    benzonatate (TESSALON PERLES) 100 MG capsule Take 1 capsule (100 mg) by mouth 3 times daily as needed for cough  Unknown    bumetanide (BUMEX) 1 MG tablet Take 1 tablet (1 mg) by mouth 2 times daily 10/25/2023: Held 10/24 PM until recheck BMP on 10/27 due to worsening renal function.  10/24/2023 at am - ON HOLD    cetirizine (ZYRTEC) 5 MG tablet Take 1 tablet (5 mg) by mouth daily as needed for allergies  Unknown    chlorhexidine (PERIDEX) 0.12 % solution Take 15 mLs by mouth nightly as needed  Unknown    escitalopram (LEXAPRO) 10 MG tablet Take 1 tablet (10 mg) by mouth daily  10/25/2023 at am    famotidine (PEPCID) 20 MG tablet Take 1 tablet (20 mg) by mouth every other day  10/24/2023 at am    fluticasone (FLONASE) 50 MCG/ACT nasal spray Spray 1 spray into both nostrils daily as needed for rhinitis or allergies  Unknown    Fluticasone-Umeclidin-Vilanterol (TRELEGY ELLIPTA) 200-62.5-25 MCG/INH oral inhaler Inhale 1 puff into the lungs At Bedtime  10/24/2023 at pm    ipratropium (ATROVENT HFA) 17 MCG/ACT inhaler Inhale 2 puffs into the lungs every 6 hours as needed for wheezing  Unknown    ipratropium - albuterol 0.5 mg/2.5 mg/3 mL (DUONEB) 0.5-2.5 (3) MG/3ML neb solution Take 1 vial by nebulization every 6 hours as needed for shortness of breath, wheezing or cough  Unknown    Melatonin 5 MG TBDP Take 15 mg by mouth At Bedtime  10/24/2023 at pm    metoprolol succinate ER (TOPROL XL) 50 MG 24 hr tablet Take 1 tablet (50 mg) by mouth At Bedtime  10/24/2023 at pm    mirabegron (MYRBETRIQ) 25 MG 24 hr tablet Take 1 tablet (25 mg) by mouth daily  10/25/2023 at am    nystatin (MYCOSTATIN) 677554 UNIT/GM external cream Apply topically 3 times daily as needed for dry skin  Unknown    OLANZapine (ZYPREXA) 5 MG tablet Take 1 tablet (5 mg) by mouth At Bedtime  10/24/2023 at pm    prochlorperazine (COMPAZINE) 10 MG tablet Take 1 tablet (10 mg) by mouth every 6  hours as needed (Nausea/Vomiting)  Unknown    rivaroxaban ANTICOAGULANT (XARELTO) 15 MG TABS tablet Take 1 tablet (15 mg) by mouth At Bedtime  10/24/2023 at pm    simvastatin (ZOCOR) 40 MG tablet Take 1 tablet (40 mg) by mouth At Bedtime  10/24/2023 at pm    sodium chloride 0.9 % neb solution Take 3 mLs by nebulization every 3 hours as needed for wheezing  Unknown    [START ON 10/26/2023] sotorasib (LUMAKRAS) 120 MG tablet Take 8 tablets (960 mg) by mouth daily for 30 days Do not chew, crush or split tablets. 10/25/2023: Dose is 8 tablets (not 6 tabs as on record with [facility]).  Follows with Dr. Suarez, had follow-up phone call on 10/19/23, says she is doing well on the increased dose of sotorasib and denies any side effects. Has been on 960 mg (8 tabs) for awhile.           8/31/2023    11:00 AM 9/7/2023    11:00 AM 9/12/2023    11:00 AM 10/19/2023    10:00 AM  ORAL CHEMOTHERAPY  Assessment Type Initial Work up;New Teach Initial Follow up Other  10/24/2023 at PM - will supply    spironolactone (ALDACTONE) 25 MG tablet Take 1 tablet (25 mg) by mouth daily 10/25/2023: Held 10/24 PM until recheck BMP on 10/27 due to worsening renal function.  10/24/2023 at am - ON HOLD    tamoxifen (NOLVADEX) 20 MG tablet Take 1 tablet (20 mg) by mouth daily  10/25/2023 at am    traMADol (ULTRAM) 50 MG tablet Take 50 mg by mouth 3 times daily as needed for severe pain  Unknown    zinc oxide (DESITIN) 40 % external ointment Apply topically as needed for dry skin or irritation (Twice a day to gluteal area skin irritation) (Patient taking differently: Apply topically 2 times daily as needed for dry skin or irritation (Twice a day to gluteal area skin irritation))  Unknown

## 2023-10-25 NOTE — NURSING NOTE
Is the patient currently in the state of [place]? YES    Visit mode:VIDEO    If the visit is dropped, the patient can be reconnected by: VIDEO VISIT: Text to cell phone:   Telephone Information:   Mobile [phone]       Will anyone else be joining the visit? NO  (If patient encounters technical issues they should call [phone]    How would you like to obtain your AVS? Mail a copy    Are changes needed to the allergy or medication list?  Pt's daughter stated pt's allergies or medications have not changed since last reviewed last week.    Reason for visit: RECHECK    Vanessa BEE

## 2023-10-25 NOTE — PROGRESS NOTES
Virtual Visit Details    Type of service:  Video Visit   Video Start Time:  11:36 AM  Video End Time: 11:57 AM    Originating Location (pt. Location): TCU    Distant Location (provider location):  On-site  Platform used for Video Visit: [facility] Hematology and Oncology Progress Note    Patient: Irene León  MRN: [id number]  Date of Service: Oct 25, 2023       Reason for Visit    Chief Complaint   Patient presents with    RECHECK       Assessment and Plan     Cancer Staging   Invasive ductal carcinoma of breast, female, left (H)  Staging form: Breast, AJCC 8th Edition  - Pathologic stage from 6/20/2017: Stage IA (pT1b, pN0(sn), cM0, G1, ER+, OH+, HER2-, Oncotype DX score: 6) - Signed by Devon Suarez MD on 7/4/2022      ECOG Performance    3 - Confined to bed/chair > 50% of time, capable of limited self care     Pain       #.   A new skin nodule/mass on the right back  #.   Right upper lobe pulmonary lung cancer with adjacent satellite nodule and thoracic lymph node metastasis most consistent with locally advanced lung cancer.  There is no evidence of distant metastasis. KRAS G12C mutated.  #.  Bilateral adrenal nodules concerning for metastatic lesions    #.  History of invasive ductal carcinoma of the left breast.   Stage IA (pT1b, pN0 (sn),cM0). grade 1, associated DCIS, ER/OH positive, HER-2 negative, s/p left breast lumpectomy and left axillary sentinel lymph node biopsy. Right breast atypical ductal hyperplasia s/p right breast excisional biopsy on 6/20/2017.  She has several comorbidities including nonischemic cardiomyopathy (EF 40% in 3/17), osteoporosis on treatment with oral bisphosphonate, factor V Leiden mutation (details unknown). Started Tamoxifen in 12/2017.  #.  Severe COPD and chronic hypoxic respiratory failure, on supplemental oxygen- On 3 L at rest, 5-6 L with exertion.  #.  CHF with reduced EF of 20-25%  #.  CKD - 3b     Reviewed US report from 10/19/2023. It showed  3x2.2x1.1 cam mass in the right mid back and it was not a cyst, rather looks like a mass.  I shared with the patient that it is concerning for malignant tumor given her history of lung cancer and solid-appearing mass by ultrasound.  Recommended tissue biopsy to further evaluate.  They wanted the boom opinion about what it could be.  I told him that this is either benign or malignant, confirmed by biopsy.  If this turns out to be malignant and related to lung cancer, it is consistent with clear progression of cancer on sotorasib.  In that case, she will not be a candidate for systemic chemotherapy/immunotherapy due to her several comorbidities and poor performance status.  She wanted further evaluation.   Requested CT scan of chest/abdomen/pelvis for restaging since we will need to reevaluate the bilateral adrenal lesions as well as pulmonary nodules and mediastinal lymphadenopathy.  I also requested biopsy of the soft tissue mass on the back by radiology.  I also offer her to obtain biopsy in our clinic when she comes in for CT scan for scheduling convenience.   Recommended to continue sotorasib 960 mg daily for now.   Follow-up with me about a week after completion of biopsy.       Encounter Diagnoses:    Problem List Items Addressed This Visit       Invasive ductal carcinoma of breast, female, left (H)    Relevant Orders    CT Chest/Abdomen/Pelvis w Contrast    IR Referral    Malignant neoplasm of upper lobe of right lung (H)    Relevant Orders    CT Chest/Abdomen/Pelvis w Contrast    IR Referral     Other Visit Diagnoses       Mass on back    -  Primary    Relevant Orders    CT Chest/Abdomen/Pelvis w Contrast    IR Referral                CC: Pankaj Montanez MD   ______________________________________________________________________________  Diagnosis  6/20/17  Stage IA (pT1b, pN0(sn), cM0) invasive ductal carcinoma of the left breast  - ER (high positive, 98%), MD (high positive, 97%) HER2 (negative, score of 1+  by IHC)  - Leena grade 1  - Tumor size: <1 cm  - Margin-negative on final margin with reexcision for invasive carcinoma but close margin for DCIS of 0.2 cm from new medial margin.    - 1 sentinel lymph node removed, negative for carcinoma  - associated DCIS  - Right breast atypical ductal hyperplasia.    - Oncotype DX recurrence score 6 : 10 year risk of recurrence with 5 year of tamoxifen is 5%.    Therapy to date:  6/20/17 -right breast excisional biopsy AND left breast lumpectomy, left axillary sentinel lymph node biopsy  6/30/17-reexcision lumpectomy of the left breast  9/8/17-completed adjuvant radiation to the left breast 4256 cGy/16  12/6/17-8/11/2023- adjuvant endocrine therapy with tamoxifen.    8/2023-right upper lobe lung cancer with adjacent satellite nodules and thoracic lymph node metastasis   It was diagnosed after a progressively enlarging right upper lobe lung mass with satellite nodules.  Due to her severe COPD/emphysema, chronic hypoxic respiratory failure and increased risk of complications, lung or mediastinal lymph node biopsy was unable to obtain.  They are no other alternative site for biopsy.    Radiation is absolutely contraindicated with her pulmonary condition.  Her current performance status is not adequate for chemotherapy either.  She has strong desire to look into potential treatment option with the goal of palliation including prolonging life expectancy. [id number] test was completed.  It showed KRAS G12C, TP53 mutations.     9/1/2023-started sotorasib 960 mg daily.  Held on 9/22/2023 due to confusion/delirium, hospitalization. Restart at 720 mg on 10/2/2023 and then increase back to 960 mg daily    Comorbidities  #.  Mild hypoxia and chronic cough.   She is closely follow with pulmonologist at [facility].    #. Hypertension, controlled.  #.  Osteoporosis  #.  COPD  #.  Idiopathic cardiomyopathy- LVEF 40% in October 2018, no change from prior.      History of Present Illness    Ms.  Irene León presented today accompanied by her daughter, Leelee and her son who is visiting here.  She wanted to show me a new lump appeared on her back about a week ago. It is painful. She also reported more flank pain.  She is currently in TCU.  She reported she is doing OK. However, it has been difficult in doing activity and keeping clinic appts.    She does not recall any side effects from sotorasib and she takes 6 tabs daily.  Her daughter reported she has mild intermittent confusion/ delirium but not severe.     Review of systems  Apart from describing in HPI, the remainder of comprehensive ROS was negative.    Past History    Past Medical History:   Diagnosis Date    ANATOLIY (acute kidney injury) (H24)     Allergic rhinitis     Arrhythmia     Atrial fibrillation with RVR (H)     Bacteremia     Breast cancer (H) 2017    Cardiomyopathy (H)     Centrilobular emphysema (H)     CHF (congestive heart failure) (H)     Chronic kidney disease     CKD (chronic kidney disease)     COPD (chronic obstructive pulmonary disease) (H)     Coronary artery disease     Depression     Dysphagia     E. coli sepsis (H)     Factor 5 Leiden mutation, heterozygous (H24)     GERD (gastroesophageal reflux disease)     Heart failure with reduced ejection fraction (H) 04/17/2023    Hx of radiation therapy 2017    Hyperlipidemia     Hypertension     Hypokalemia     Hypomagnesemia     Idiopathic cardiomyopathy (H)     Left hip pain 04/30/2014    OAB (overactive bladder)     Osteoporosis     Sacral insufficiency fracture     Sinusitis     Syncope     Urge incontinence     Vocal cord dysfunction        Past Surgical History:   Procedure Laterality Date    ARTHROPLASTY REVISION HIP Left     BIOPSY BREAST Right 2017    BLADDER SURGERY      bladder interstim with removal    CARDIAC CATHETERIZATION      CATARACT EXTRACTION Left 07/18/2017    IR ABDOMINAL AORTOGRAM  4/16/2003    IR AORTIC ARCH 4 VESSEL ANGIOGRAM  4/16/2003    IR MISCELLANEOUS  PROCEDURE  4/16/2003    LUMPECTOMY BREAST Left 06/2017    x2    PICC DOUBLE LUMEN PLACEMENT  4/11/2022         PICC TRIPLE LUMEN PLACEMENT  4/7/2022         PICC TRIPLE LUMEN PLACEMENT  10/25/2023    ZZC OPEN TX FEMORAL FRACTURE DISTAL MED/LAT CONDYLE Left 10/28/2015    Procedure: OPEN REDUCTION INTERNAL FIXATION LEFT  PROXIMAL FEMUR PERIPROSTHETIC FRACTURE;  Surgeon: Yovanny Albarran MD;  Location: [facility];  Service: Orthopedics       Physical Exam    There were no vitals taken for this visit.    General: alert, awake, not in acute distress however, she looks very tired.  Frail.  She wears 3 L oxygen by nasal cannula.    There is a mass of about 3-4 cm on the right mid back. Her daughter (who is an RN) palpated and reported it is hard.  CNS: non focal.  Oriented x3.     Lab Results    Recent Results (from the past 168 hour(s))   Comprehensive metabolic panel   Result Value Ref Range    Sodium 140 135 - 145 mmol/L    Potassium 3.6 3.4 - 5.3 mmol/L    Carbon Dioxide (CO2) 28 22 - 29 mmol/L    Anion Gap 8 7 - 15 mmol/L    Urea Nitrogen 24.9 (H) 8.0 - 23.0 mg/dL    Creatinine 1.45 (H) 0.51 - 0.95 mg/dL    GFR Estimate 37 (L) >60 mL/min/1.73m2    Calcium 8.6 (L) 8.8 - 10.2 mg/dL    Chloride 104 98 - 107 mmol/L    Glucose 129 (H) 70 - 99 mg/dL    Alkaline Phosphatase 49 35 - 104 U/L    AST 26 0 - 45 U/L    ALT 5 0 - 50 U/L    Protein Total 6.5 6.4 - 8.3 g/dL    Albumin 2.8 (L) 3.5 - 5.2 g/dL    Bilirubin Total 0.2 <=1.2 mg/dL   CBC with platelets and differential   Result Value Ref Range    WBC Count 8.2 4.0 - 11.0 10e3/uL    RBC Count 3.31 (L) 3.80 - 5.20 10e6/uL    Hemoglobin 9.4 (L) 11.7 - 15.7 g/dL    Hematocrit 31.9 (L) 35.0 - 47.0 %    MCV 96 78 - 100 fL    MCH 28.4 26.5 - 33.0 pg    MCHC 29.5 (L) 31.5 - 36.5 g/dL    RDW 14.6 10.0 - 15.0 %    Platelet Count 176 150 - 450 10e3/uL    % Neutrophils 73 %    % Lymphocytes 9 %    % Monocytes 11 %    % Eosinophils 5 %    % Basophils 1 %    % Immature  Granulocytes 1 %    NRBCs per 100 WBC 0 <1 /100    Absolute Neutrophils 6.1 1.6 - 8.3 10e3/uL    Absolute Lymphocytes 0.8 0.8 - 5.3 10e3/uL    Absolute Monocytes 0.9 0.0 - 1.3 10e3/uL    Absolute Eosinophils 0.4 0.0 - 0.7 10e3/uL    Absolute Basophils 0.1 0.0 - 0.2 10e3/uL    Absolute Immature Granulocytes 0.0 <=0.4 10e3/uL    Absolute NRBCs 0.0 10e3/uL   ECG 12-LEAD WITH MUSE (LHE)   Result Value Ref Range    Systolic Blood Pressure  mmHg    Diastolic Blood Pressure  mmHg    Ventricular Rate 85 BPM    Atrial Rate 85 BPM    GA Interval 262 ms    QRS Duration 122 ms     ms    QTc 504 ms    P Axis 75 degrees    R AXIS -79 degrees    T Axis 95 degrees    Interpretation ECG       Sinus rhythm with 1st degree A-V block  Left axis deviation  Non-specific intra-ventricular conduction delay  Nonspecific ST and T wave abnormality  Abnormal ECG  When compared with ECG of 30-SEP-2023 05:09,  GA interval has increased  Confirmed by SEE ED PROVIDER NOTE FOR, ECG INTERPRETATION ([id number]),  Dorinda Watkins ([id number]) on 10/25/2023 6:25:07 PM     Basic metabolic panel   Result Value Ref Range    Sodium 139 135 - 145 mmol/L    Potassium 3.8 3.4 - 5.3 mmol/L    Chloride 102 98 - 107 mmol/L    Carbon Dioxide (CO2) 28 22 - 29 mmol/L    Anion Gap 9 7 - 15 mmol/L    Urea Nitrogen 17.5 8.0 - 23.0 mg/dL    Creatinine 1.26 (H) 0.51 - 0.95 mg/dL    GFR Estimate 43 (L) >60 mL/min/1.73m2    Calcium 9.5 8.8 - 10.2 mg/dL    Glucose 93 70 - 99 mg/dL   Troponin T, High Sensitivity (now)   Result Value Ref Range    Troponin T, High Sensitivity 31 (H) <=14 ng/L   N terminal pro BNP outpatient   Result Value Ref Range    N Terminal Pro BNP Outpatient 1,845 (H) 0 - 1,800 pg/mL   Blood gas venous   Result Value Ref Range    pH Venous 7.40 7.35 - 7.45    pCO2 Venous 51 (H) 35 - 50 mm Hg    pO2 Venous 21 (L) 25 - 47 mm Hg    Bicarbonate Venous 32 (H) 24 - 30 mmol/L    Base Excess/Deficit 6.7   mmol/L    Oxyhemoglobin Venous 29.9 (L) 70.0 - 75.0 %     O2 Sat, Venous 30.3 (L) 70.0 - 75.0 %   INR   Result Value Ref Range    INR 1.15 0.85 - 1.15   Hepatic function panel   Result Value Ref Range    Protein Total 7.7 6.4 - 8.3 g/dL    Albumin 3.5 3.5 - 5.2 g/dL    Bilirubin Total 0.3 <=1.2 mg/dL    Alkaline Phosphatase 54 35 - 104 U/L    AST 27 0 - 45 U/L    ALT <5 0 - 50 U/L    Bilirubin Direct <0.20 0.00 - 0.30 mg/dL   Procalcitonin   Result Value Ref Range    Procalcitonin 0.11 (H) <0.05 ng/mL   CBC with platelets and differential   Result Value Ref Range    WBC Count 15.0 (H) 4.0 - 11.0 10e3/uL    RBC Count 3.71 (L) 3.80 - 5.20 10e6/uL    Hemoglobin 10.6 (L) 11.7 - 15.7 g/dL    Hematocrit 34.6 (L) 35.0 - 47.0 %    MCV 93 78 - 100 fL    MCH 28.6 26.5 - 33.0 pg    MCHC 30.6 (L) 31.5 - 36.5 g/dL    RDW 14.4 10.0 - 15.0 %    Platelet Count 205 150 - 450 10e3/uL    % Neutrophils 74 %    % Lymphocytes 11 %    % Monocytes 11 %    % Eosinophils 3 %    % Basophils 0 %    % Immature Granulocytes 1 %    NRBCs per 100 WBC 0 <1 /100    Absolute Neutrophils 11.2 (H) 1.6 - 8.3 10e3/uL    Absolute Lymphocytes 1.6 0.8 - 5.3 10e3/uL    Absolute Monocytes 1.6 (H) 0.0 - 1.3 10e3/uL    Absolute Eosinophils 0.4 0.0 - 0.7 10e3/uL    Absolute Basophils 0.1 0.0 - 0.2 10e3/uL    Absolute Immature Granulocytes 0.1 <=0.4 10e3/uL    Absolute NRBCs 0.0 10e3/uL   Symptomatic Influenza A/B, RSV, & SARS-CoV2 PCR (COVID-19) Nasopharyngeal    Specimen: Nasopharyngeal; Swab   Result Value Ref Range    Influenza A PCR Negative Negative    Influenza B PCR Negative Negative    RSV PCR Negative Negative    SARS CoV2 PCR Negative Negative   Lactic acid whole blood   Result Value Ref Range    Lactic Acid 1.2 0.7 - 2.0 mmol/L   UA with Microscopic reflex to Culture    Specimen: Urine, NOS   Result Value Ref Range    Color Urine Yellow Colorless, Straw, Light Yellow, Yellow    Appearance Urine Clear Clear    Glucose Urine Negative Negative mg/dL    Bilirubin Urine Negative Negative    Ketones Urine Trace  (A) Negative mg/dL    Specific Gravity Urine 1.022 1.001 - 1.030    Blood Urine Negative Negative    pH Urine 6.5 5.0 - 7.0    Protein Albumin Urine 10 (A) Negative mg/dL    Urobilinogen Urine <2.0 <2.0 mg/dL    Nitrite Urine Negative Negative    Leukocyte Esterase Urine Negative Negative    RBC Urine 1 <=2 /HPF    WBC Urine 1 <=5 /HPF    Squamous Epithelials Urine <1 <=1 /HPF         Imaging    XR Chest Port 1 View    Result Date: 10/25/2023  EXAM: XR CHEST PORT 1 VIEW LOCATION: [facility] DATE: 10/25/2023 INDICATION: RN placed PICC   verify tip placement COMPARISON: CT 10/25/2023, 1804 hours     IMPRESSION: Right upper extremity PICC tip in the mid SVC. Grossly unchanged emphysema, pulmonary nodules, right hilar adenopathy.    Chest CT w/o contrast    Result Date: 10/25/2023  EXAM: CT CHEST W/O CONTRAST LOCATION: [facility] DATE: 10/25/2023 INDICATION: Dyspnea, productive cough COMPARISON: 9/28/2023 TECHNIQUE: CT chest without IV contrast. Multiplanar reformats were obtained. Dose reduction techniques were used. CONTRAST: None. FINDINGS: LUNGS AND PLEURA: Slightly decreased size of the peripheral right upper lobe pulmonary nodule measuring 1.7 x 1.4 cm (5/76), previously 1.9 x 1.9 cm, prior to that 2.3 x 2.1 cm. However, there is again increased size of many other satellite nodules bilaterally. For example: -Centrally in the right upper lobe, 2.1 x 2.0 cm (5/60), previously 0.7 x 0.5 cm. -Left lower lobe subpleural nodule, 1.0 x 0.9 cm (5/71), previously 0.7 x 0.7 cm. Advanced emphysema with areas of atelectasis and scarring. Similar pleural thickening and architectural distortion in the anterior lingula. Most of the endobronchial debris has resolved compared to prior. No pleural effusion or pneumothorax. MEDIASTINUM/AXILLAE: Increased size of mediastinal and right hilar lymph nodes compared to 9/20/2023. For example, a pretracheal lymph node now measures 4.2 x  4.1 cm (4/56), previously 2.1 x 1.5 cm. CORONARY ARTERY CALCIFICATION: Mild. UPPER ABDOMEN: Bilateral adrenal masses and right adrenal hemorrhage, better demonstrated on CT 9/29/2023. Small perinephric nodules are new compared to 9/29/2023. MUSCULOSKELETAL: No aggressive or destructive lesions. Unchanged T12 compression deformity.     IMPRESSION: 1.  Worsening metastatic disease with increased size of multiple pulmonary nodules and thoracic lymph nodes compared to 9/28/2023. 2.  Numerous retroperitoneal/perinephric nodules in the visualized upper abdomen appear to be new compared to 9/29/2023 and also worrisome for metastatic disease. Bilateral adrenal nodules and right adrenal hemorrhage were better characterized on prior contrast-enhanced CT.     XR Chest Port 1 View    Result Date: 10/25/2023  EXAM: XR CHEST PORT 1 VIEW LOCATION: [facility] DATE: 10/25/2023 INDICATION: Dyspnea, respiratory failure COMPARISON: Chest x-ray 09/28/2023 and CT chest 09/28/2023     IMPRESSION: Heart size within normal limits. Enlarged right hilum representing adenopathy as seen on CT. Pulmonary vascularity normal. Mid right lung nodule, better appreciated on CT. Subsegmental atelectasis or scarring in the bases. No acute infiltrates or effusions.    CT Abdomen Pelvis w Contrast    Result Date: 9/29/2023  EXAM: CT ABDOMEN PELVIS W CONTRAST LOCATION: [facility] DATE: 9/29/2023 INDICATION: acute onset right upper quadrant abdominal pain COMPARISON: Ultrasound 9/28/2023 TECHNIQUE: CT scan of the abdomen and pelvis was performed following injection of IV contrast. Multiplanar reformats were obtained. Dose reduction techniques were used. CONTRAST: 53ml Isovue 370 FINDINGS: LOWER CHEST: Tiny right pleural effusion with associated right lower lobe airspace opacities and 0.3 cm middle lobe nodule, unchanged compared to 9/28/2023 HEPATOBILIARY: Normal. PANCREAS: Normal. SPLEEN: Normal. ADRENAL  GLANDS: 5.7 x 3.5 cm right adrenal nodule with surrounding high attenuation that extends inferiorly along the right kidney. 1.8 x 1.4 cm left adrenal nodule (3/53). Both are new compared to PET/CT of 8/11/2023. KIDNEYS/BLADDER: No significant mass, stones, or hydronephrosis. There are simple or benign cysts. No follow up is needed. Circumferential bladder wall thickening. BOWEL: Normal. LYMPH NODES: No lymphadenopathy. VASCULATURE: Moderate multifocal atherosclerotic calcification of the abdominal aorta and iliofemoral arteries. PELVIC ORGANS: Normal. MUSCULOSKELETAL: Osteopenia and degenerative changes in the spine with dextroconvex curvature of the lumbar spine. Moderate T12 compression deformity is unchanged. Left hip surgical hardware. No aggressive or destructive osseous lesions. Partially imaged  subcutaneous nodule in the right lower back posteriorly.     IMPRESSION: 1.  Bilateral adrenal nodules are new compared to 8/11/2023 and concerning for metastases. There is high attenuation fluid associated with the right adrenal nodule, which is likely hemorrhage and likely accounts for acute right upper quadrant pain. Hemorrhage extends inferiorly in the retroperitoneum along the right kidney. 2.  Tiny right pleural effusion with associated airspace opacities.    US Abdomen Limited*    Result Date: 9/28/2023  EXAM: US ABDOMEN LIMITED LOCATION: [facility] DATE: 9/28/2023 INDICATION: Acute onset right upper quadrant pain radiating to the back. COMPARISON: PET/CT 08/11/2023. TECHNIQUE: Limited abdominal ultrasound. FINDINGS: GALLBLADDER: No gallstones, wall thickening, or pericholecystic fluid. Negative sonographic Álvarez's sign. BILE DUCTS: No biliary dilatation. The common duct measures 2 mm. LIVER: Normal parenchyma with smooth contour. No focal mass. RIGHT KIDNEY: No hydronephrosis. PANCREAS: The visualized portions are normal. No ascites. Incompletely seen right upper quadrant mass,  measuring at least 5.5 cm.     IMPRESSION: 1.  Right upper quadrant mass (possibly adrenal in origin), suspicious for metastatic disease. Recommend contrast enhanced CT abdomen - pelvis. 2.  No cholelithiasis. Gallbladder wall thickness is exaggerated by partial distention of the gallbladder. Accounting for this, no definitive evidence for acute cholecystitis. 3.  Remaining exam unremarkable.     CT Chest Pulmonary Embolism w Contrast    Result Date: 9/28/2023  EXAM: CT CHEST PULMONARY EMBOLISM W CONTRAST LOCATION: [facility] DATE: 9/28/2023 INDICATION: Chest pain, dyspnea COMPARISON: PET/CT from 08/11/2023 TECHNIQUE: CT chest pulmonary angiogram during arterial phase injection of IV contrast. Multiplanar reformats and MIP reconstructions were performed. Dose reduction techniques were used. CONTRAST: 75ml Isovue 370 FINDINGS: ANGIOGRAM CHEST: There is mixing artifact in the right lower lobe pulmonary artery on series 7 image 145. Pulmonary arteries are normal caliber and negative for pulmonary emboli. Thoracic aorta is negative for dissection. LUNGS AND PLEURA: Slight interval decrease in the size of the peripheral right upper lobe pulmonary nodule measuring 1.9 x 1.9 cm compared to 2.3 x 2.1 cm previously (series 8 image 116). However, there is increased size of the 2 satellite nodules. For example on series 8 image 131 measuring 0.8 x 0.6 mm compared to 0.6 x 0.4 cm. There is a satellite nodule just superior to this which is new or more conspicuous compared to prior exam measuring 1.0 x 0.9 cm (series 8 image 123). The satellite nodule superior to the dominant right lobe nodule on series 8 image 91 is similar measuring 1.2 x 0.6 cm.There is a new satellite nodule in the right upper lobe on series 8 image 92 measuring 0.7 x 0.5 cm. Advanced emphysema. Scattered areas of atelectasis and scarring. Similar pleural thickening and architectural distortion in the anterior left upper lobe/left  lingula on series 8 image 118. Moderate to large volume airway secretions particularly in the right lung and right lower lobe with bronchial wall thickening, mucous plugging, with postobstructive consolidation and groundglass opacities. Mild bronchial wall thickening. MEDIASTINUM/AXILLAE: Mild cardiomegaly. Mild reflux of contrast in the IVC and proximal hepatic veins. Persistent enlarged external adenopathy. A few of these are mildly increased in size compared to prior exam. The largest is in the lower paratracheal chain measuring 2.1 x 1.5 cm on series 7 image 86 (previously 1.7 x 1.3 cm). CORONARY ARTERY CALCIFICATION: Mild. UPPER ABDOMEN: Ectasia of the infrarenal abdominal aorta measuring up to 2.8 cm. Extensive atherosclerosis. MUSCULOSKELETAL: Degenerative changes of the spine. Chronic severe T12 compression deformity with associated focal kyphosis. No suspicious osseous lesions.     IMPRESSION: 1.  No pulmonary embolus. 2.  New manifestations of aspiration pneumonitis/pneumonia in the right lower lobe with moderate to large volume airway secretions. 3.  Slightly decreased size of the dominant nodule within the right upper lobe. 4.  However, there are a few new or increasing satellite nodules in the right lung. Additionally, interval increase in the size of several of the mediastinal lymph nodes. Continued attention on follow-up. 5.  Advanced emphysema.    XR Chest Port 1 View    Result Date: 9/28/2023  EXAM: XR CHEST PORT 1 VIEW LOCATION: [facility] DATE: 9/28/2023 INDICATION: right side chest pain COMPARISON: 09/23/2023 and multiple prior studies, PET CT 08/11/2023 and older exams.     IMPRESSION: No interval change given shallower inspiration. The extensive emphysema is better appreciated on the CT. Known, inferior right upper lobe pulmonary nodule again noted and overlies the right posterior seventh rib. Bandlike fibroatelectasis in the left midlung and mild bibasilar fibrosis  unchanged. No signs of acute pneumonia or failure. Heart and pulmonary vascularity are normal.     30 minutes spent on the date of the encounter doing chart review, history and exam, documentation, communication of the treatment plan with the care team and further activities as noted above.    Signed by: Devon Suarez MD

## 2023-10-26 NOTE — PLAN OF CARE
Problem: Pain Acute  Goal: Optimal Pain Control and Function  Outcome: Progressing     Problem: Gas Exchange Impaired  Goal: Optimal Gas Exchange  Outcome: Progressing     A&O.  On 3-6L nasal cannula.  Does get extremely dyspneic with turns.  Encouraging to deep breathe.   Palliative and Onc met with patient and family today.  Plans for skin biopsy tomorrow.  Pain with turns and to nodule on R back.  PRN meds given.  Repositioning q2.  Limited d/t pain with any pressure on nodule on back.  Purewick in place.  Tolerating PO intake.  Calls with needs.

## 2023-10-26 NOTE — TELEPHONE ENCOUNTER
Return call placed to Leelee. She is given information that dosage was changed on 10/12 and order was faxed to care facility. She verbalizes understanding. Leelee asks if she can receive a confirmation of the fax that was sent.     Spoke with Erin Nogueira RN care coordinator, she can give Leelee confirmation of date and time but not a paper confirmation. Erin will follow up with Leelee regarding this.    Bianca Coates RN

## 2023-10-26 NOTE — ED NOTES
Unable to start pressor due to 20 gauge iv. Picc here about to insert picc. Will start once picc is placed. Now blood pressure is up as well. Will hold pressor
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
## 2023-10-26 NOTE — CONSULTS
[facility] Hematology and Oncology Inpatient Consult Note    Patient: Irene León  MRN: [id number]  Date of Service: 10/26/2023      Reason for Visit    I was consulted by . No ref. provider found  regarding RUL lung cancer and h/o breast cancer.     Assessment/Plan      #.  New skin nodules on her right mid back and anterior chest  #.  Anxiety disorder as well as situational anxiety, uncontrolled  #.  Right upper lobe lung cancer with satellite nodules and thoracic lymph node metastasis radiographically most consistent with locally advanced lung cancer, but never been pathologically confirmed.  Currently on Lumakras based on K-suzi G12 C mutation from liquid biopsy.  #.  History of invasive ductal carcinoma of the left breast, ER/OH positive, HER2 negative in 2017.  Currently on adjuvant tamoxifen.  #.  Severe COPD  #.  Congestive heart failure with reduced EF  #.  CKD-3b       I reviewed the hospital course, her echocardiogram, CT scan images and report in detail.  There are findings concerning for cancer progression.  However it does not appear that the current acute on chronic respiratory failure is not likely related to many small satellite nodules and thoracic lymph nodes.      The skin nodules on her chest and her right mid back are reportedly new.  I discussed again that it could be benign etiology like lipoma which become more evident with her significant weight loss or it could be malignant metastatic lesions.  We talked about hypothetical situation of either benign lesion or malignant lesion. If this is malignant lesion and it is lung cancer, there will not be feasible treatment option for her.  If this is malignant lesion with hormone receptor positive breast cancer, cancer directed therapy will be feasible with manageable side effects profile.       Bottom line is she cannot put her mind at ease without knowing the exact etiology of her cancer and its status and without clear determination of  cancer directed therapy.  I believe biopsy of the skin lesion likely provides us with definitive diagnosis and can formulate definitive treatment options.  It appears least invasive approach.  She wanted to proceed with biopsy.    We talked about overall picture of her health.  She has severe systolic and diastolic congestive heart failure with EF of 20-25%.  She has severe COPD with chronic hypoxic respiratory failure which appears to be exacerbated quite frequently in the last several months.  I discussed that she has 3 major medical problems that are not curable including heart disease, lung disease and cancer.  Her cardiac and lung status seems more acute and imminent issues.  She has understanding that she would qualify for symptom focused care under hospice based on her end stages of cardiac or lung disease or cancer.     She has tremendous stress and anxiety which seems disabling her.  We believe that the reason that she came to the hospital yesterday was related to stress and anxiety almost sounded like anxiety attack after I discussed about possible etiology of the skin lesions during my visit earlier that day when she was well and comfortable.  She is on Lexapro 10 mg daily as outpatient meds.      -Xarelto was held last night.  I advised her to hold Xarelto today.  -Requested needle biopsy of the skin lesion by radiology tomorrow.    -We can hold off MRI of the brain at this point due to severe anxiety (related to the procedure as well as anticipatory anxiety of the result ).  The MRI brain results will not change current treatment in any way at this point.  -I will plan to follow-up with her next week when biopsy result is available.  -She will benefit from further aggressive management of her anxiety.  -I clarified with her that Lumakras 720 mg or 960 mg should not make a significant impact on cancer progression within 4 weeks. OK to resume since she does not have major side effects from  it.    discussed with Laurence Walker, palliative care and Dr. Franz from hospitalist service.  ______________________________________________________________________________      Staging History    Cancer Staging   Invasive ductal carcinoma of breast, female, left (H)  Staging form: Breast, AJCC 8th Edition  - Pathologic stage from 6/20/2017: Stage IA (pT1b, pN0(sn), cM0, G1, ER+, MI+, HER2-, Oncotype DX score: 6) - Signed by Devon Suarez MD on 7/4/2022        History  Ms. Irene León is a very pleasant 78 year old female who I have been following for right upper lobe lung cancer and left breast cancer is currently in the hospital with COPD exacerbation.  It occurred soon after the virtual visit with me yesterday after discussing about neck step in management.  New skin lesions.  She admitted that she is very anxious.    She had episode of visual field loss today.  Upon further questioning, she denies having that issue.  She was even shaking 1 talking about getting MRI brain.  She thinks that she is feeling okay today.  She has several family member around her today.    Review of systems.  Apart from describing in history, the remainder of comprehensive ROS was negative.      Past History  Past Medical History:   Diagnosis Date    ANATOLIY (acute kidney injury) (H24)     Allergic rhinitis     Arrhythmia     Atrial fibrillation with RVR (H)     Bacteremia     Breast cancer (H) 2017    Cardiomyopathy (H)     Centrilobular emphysema (H)     CHF (congestive heart failure) (H)     Chronic kidney disease     CKD (chronic kidney disease)     COPD (chronic obstructive pulmonary disease) (H)     Coronary artery disease     Depression     Dysphagia     E. coli sepsis (H)     Factor 5 Leiden mutation, heterozygous (H24)     GERD (gastroesophageal reflux disease)     Heart failure with reduced ejection fraction (H) 04/17/2023    Hx of radiation therapy 2017    Hyperlipidemia     Hypertension     Hypokalemia      Hypomagnesemia     Idiopathic cardiomyopathy (H)     Left hip pain 2014    OAB (overactive bladder)     Osteoporosis     Sacral insufficiency fracture     Sinusitis     Syncope     Urge incontinence     Vocal cord dysfunction      Past Surgical History:   Procedure Laterality Date    ARTHROPLASTY REVISION HIP Left     BIOPSY BREAST Right 2017    BLADDER SURGERY      bladder interstim with removal    CARDIAC CATHETERIZATION      CATARACT EXTRACTION Left 2017    IR ABDOMINAL AORTOGRAM  2003    IR AORTIC ARCH 4 VESSEL ANGIOGRAM  2003    IR MISCELLANEOUS PROCEDURE  2003    LUMPECTOMY BREAST Left 06/2017    x2    PICC DOUBLE LUMEN PLACEMENT  2022         PICC TRIPLE LUMEN PLACEMENT  2022         PICC TRIPLE LUMEN PLACEMENT  10/25/2023    ZZC OPEN TX FEMORAL FRACTURE DISTAL MED/LAT CONDYLE Left 10/28/2015    Procedure: OPEN REDUCTION INTERNAL FIXATION LEFT  PROXIMAL FEMUR PERIPROSTHETIC FRACTURE;  Surgeon: Yovanny Albarran MD;  Location: [facility] Main OR;  Service: Orthopedics     Family History   Problem Relation Age of Onset    Osteoporosis Other     Prostate Cancer Brother     Breast Cancer Maternal Aunt         age thought to be in her 70's-80's    Prostate Cancer Maternal Uncle      Social History     Socioeconomic History    Marital status:      Spouse name: None    Number of children: None    Years of education: None    Highest education level: None   Tobacco Use    Smoking status: Former     Types: Cigarettes     Quit date: 10/28/2007     Years since quittin.0     Passive exposure: Past    Smokeless tobacco: Former     Quit date: 2004   Vaping Use    Vaping Use: Former   Substance and Sexual Activity    Alcohol use: No    Drug use: No   Social History Narrative     and lives in home with 3 flight of steps       Allergies    Allergies   Allergen Reactions    Sulfa (Sulfonamide Antibiotics) [Sulfa Antibiotics] Rash     Headaches and dizziness.     Homeopathic Drugs [External Allergen Needs Reconciliation - See Comment] Unknown     runny nose    Mold Extracts [Molds & Smuts] Unknown    Morphine (Pf) [Morphine] Unknown     hallucinate    Lisinopril Itching, Cough and Unknown     cough    Sulfacetamide Sodium [Sulfacetamide] Rash          Physical Exam    /61   Pulse 101   Temp 98  F (36.7  C) (Oral)   Resp 23   Wt 48.7 kg (107 lb 5.8 oz)   SpO2 97%   BMI 19.64 kg/m        General: alert, awake, she is very anxious and intermittently tremulous.  Very thin cachectic female.  HEENT: Head: Normal, normocephalic, atraumatic.  Eye: Normal external eye, conjunctiva, lids cornea, CATINA.  Nose: Normal external nose, mucus membranes and septum.  She wears oxygen by nasal cannula.  Pharynx: Normal buccal mucosa. Normal pharynx.  Neck / Thyroid: Supple, no masses, nodes, nodules or enlargement.  Lymphatics: No abnormally enlarged lymph nodes.  Extremities: normal strength, tone, and muscle mass  Skin: About 1 cm skin nodule on anterior chest wall and about 4-5 cm mass in the right mid back.  CNS: non focal.      Lab Results  Recent Results (from the past 24 hour(s))   ECG 12-LEAD WITH MUSE (L[facility])    Collection Time: 10/25/23  4:48 PM   Result Value Ref Range    Systolic Blood Pressure  mmHg    Diastolic Blood Pressure  mmHg    Ventricular Rate 85 BPM    Atrial Rate 85 BPM    AR Interval 262 ms    QRS Duration 122 ms     ms    QTc 504 ms    P Axis 75 degrees    R AXIS -79 degrees    T Axis 95 degrees    Interpretation ECG       Sinus rhythm with 1st degree A-V block  Left axis deviation  Non-specific intra-ventricular conduction delay  Nonspecific ST and T wave abnormality  Abnormal ECG  When compared with ECG of 30-SEP-2023 05:09,  AR interval has increased  Confirmed by SEE ED PROVIDER NOTE FOR, ECG INTERPRETATION ([id number]),  Dorinda Watkins ([id number]) on 10/25/2023 6:25:07 PM     Basic metabolic panel    Collection Time: 10/25/23  4:54 PM   Result Value  Ref Range    Sodium 139 135 - 145 mmol/L    Potassium 3.8 3.4 - 5.3 mmol/L    Chloride 102 98 - 107 mmol/L    Carbon Dioxide (CO2) 28 22 - 29 mmol/L    Anion Gap 9 7 - 15 mmol/L    Urea Nitrogen 17.5 8.0 - 23.0 mg/dL    Creatinine 1.26 (H) 0.51 - 0.95 mg/dL    GFR Estimate 43 (L) >60 mL/min/1.73m2    Calcium 9.5 8.8 - 10.2 mg/dL    Glucose 93 70 - 99 mg/dL   Troponin T, High Sensitivity (now)    Collection Time: 10/25/23  4:54 PM   Result Value Ref Range    Troponin T, High Sensitivity 31 (H) <=14 ng/L   N terminal pro BNP outpatient    Collection Time: 10/25/23  4:54 PM   Result Value Ref Range    N Terminal Pro BNP Outpatient 1,845 (H) 0 - 1,800 pg/mL   Blood gas venous    Collection Time: 10/25/23  4:54 PM   Result Value Ref Range    pH Venous 7.40 7.35 - 7.45    pCO2 Venous 51 (H) 35 - 50 mm Hg    pO2 Venous 21 (L) 25 - 47 mm Hg    Bicarbonate Venous 32 (H) 24 - 30 mmol/L    Base Excess/Deficit 6.7   mmol/L    Oxyhemoglobin Venous 29.9 (L) 70.0 - 75.0 %    O2 Sat, Venous 30.3 (L) 70.0 - 75.0 %   INR    Collection Time: 10/25/23  4:54 PM   Result Value Ref Range    INR 1.15 0.85 - 1.15   Hepatic function panel    Collection Time: 10/25/23  4:54 PM   Result Value Ref Range    Protein Total 7.7 6.4 - 8.3 g/dL    Albumin 3.5 3.5 - 5.2 g/dL    Bilirubin Total 0.3 <=1.2 mg/dL    Alkaline Phosphatase 54 35 - 104 U/L    AST 27 0 - 45 U/L    ALT <5 0 - 50 U/L    Bilirubin Direct <0.20 0.00 - 0.30 mg/dL   Procalcitonin    Collection Time: 10/25/23  4:54 PM   Result Value Ref Range    Procalcitonin 0.11 (H) <0.05 ng/mL   CBC with platelets and differential    Collection Time: 10/25/23  4:54 PM   Result Value Ref Range    WBC Count 15.0 (H) 4.0 - 11.0 10e3/uL    RBC Count 3.71 (L) 3.80 - 5.20 10e6/uL    Hemoglobin 10.6 (L) 11.7 - 15.7 g/dL    Hematocrit 34.6 (L) 35.0 - 47.0 %    MCV 93 78 - 100 fL    MCH 28.6 26.5 - 33.0 pg    MCHC 30.6 (L) 31.5 - 36.5 g/dL    RDW 14.4 10.0 - 15.0 %    Platelet Count 205 150 - 450 10e3/uL     % Neutrophils 74 %    % Lymphocytes 11 %    % Monocytes 11 %    % Eosinophils 3 %    % Basophils 0 %    % Immature Granulocytes 1 %    NRBCs per 100 WBC 0 <1 /100    Absolute Neutrophils 11.2 (H) 1.6 - 8.3 10e3/uL    Absolute Lymphocytes 1.6 0.8 - 5.3 10e3/uL    Absolute Monocytes 1.6 (H) 0.0 - 1.3 10e3/uL    Absolute Eosinophils 0.4 0.0 - 0.7 10e3/uL    Absolute Basophils 0.1 0.0 - 0.2 10e3/uL    Absolute Immature Granulocytes 0.1 <=0.4 10e3/uL    Absolute NRBCs 0.0 10e3/uL   Symptomatic Influenza A/B, RSV, & SARS-CoV2 PCR (COVID-19) Nasopharyngeal    Collection Time: 10/25/23  4:55 PM    Specimen: Nasopharyngeal; Swab   Result Value Ref Range    Influenza A PCR Negative Negative    Influenza B PCR Negative Negative    RSV PCR Negative Negative    SARS CoV2 PCR Negative Negative   Blood Culture Peripheral Blood    Collection Time: 10/25/23  6:32 PM    Specimen: Peripheral Blood   Result Value Ref Range    Culture No growth after 12 hours    Lactic acid whole blood    Collection Time: 10/25/23  9:30 PM   Result Value Ref Range    Lactic Acid 1.2 0.7 - 2.0 mmol/L   Blood Culture Peripheral Blood    Collection Time: 10/25/23  9:30 PM    Specimen: Peripheral Blood   Result Value Ref Range    Culture No growth after 12 hours    UA with Microscopic reflex to Culture    Collection Time: 10/25/23 10:21 PM    Specimen: Urine, NOS   Result Value Ref Range    Color Urine Yellow Colorless, Straw, Light Yellow, Yellow    Appearance Urine Clear Clear    Glucose Urine Negative Negative mg/dL    Bilirubin Urine Negative Negative    Ketones Urine Trace (A) Negative mg/dL    Specific Gravity Urine 1.022 1.001 - 1.030    Blood Urine Negative Negative    pH Urine 6.5 5.0 - 7.0    Protein Albumin Urine 10 (A) Negative mg/dL    Urobilinogen Urine <2.0 <2.0 mg/dL    Nitrite Urine Negative Negative    Leukocyte Esterase Urine Negative Negative    RBC Urine 1 <=2 /HPF    WBC Urine 1 <=5 /HPF    Squamous Epithelials Urine <1 <=1 /HPF    Comprehensive metabolic panel    Collection Time: 10/26/23  5:06 AM   Result Value Ref Range    Sodium 141 135 - 145 mmol/L    Potassium 3.7 3.4 - 5.3 mmol/L    Carbon Dioxide (CO2) 25 22 - 29 mmol/L    Anion Gap 9 7 - 15 mmol/L    Urea Nitrogen 18.5 8.0 - 23.0 mg/dL    Creatinine 1.22 (H) 0.51 - 0.95 mg/dL    GFR Estimate 45 (L) >60 mL/min/1.73m2    Calcium 8.2 (L) 8.8 - 10.2 mg/dL    Chloride 107 98 - 107 mmol/L    Glucose 144 (H) 70 - 99 mg/dL    Alkaline Phosphatase 46 35 - 104 U/L    AST 21 0 - 45 U/L    ALT <5 0 - 50 U/L    Protein Total 6.3 (L) 6.4 - 8.3 g/dL    Albumin 2.9 (L) 3.5 - 5.2 g/dL    Bilirubin Total 0.3 <=1.2 mg/dL   CBC with platelets and differential    Collection Time: 10/26/23  5:06 AM   Result Value Ref Range    WBC Count 21.2 (H) 4.0 - 11.0 10e3/uL    RBC Count 3.02 (L) 3.80 - 5.20 10e6/uL    Hemoglobin 8.8 (L) 11.7 - 15.7 g/dL    Hematocrit 28.2 (L) 35.0 - 47.0 %    MCV 93 78 - 100 fL    MCH 29.1 26.5 - 33.0 pg    MCHC 31.2 (L) 31.5 - 36.5 g/dL    RDW 14.6 10.0 - 15.0 %    Platelet Count 170 150 - 450 10e3/uL    % Neutrophils 90 %    % Lymphocytes 4 %    % Monocytes 5 %    % Eosinophils 0 %    % Basophils 0 %    % Immature Granulocytes 1 %    NRBCs per 100 WBC 0 <1 /100    Absolute Neutrophils 19.2 (H) 1.6 - 8.3 10e3/uL    Absolute Lymphocytes 0.8 0.8 - 5.3 10e3/uL    Absolute Monocytes 1.1 0.0 - 1.3 10e3/uL    Absolute Eosinophils 0.0 0.0 - 0.7 10e3/uL    Absolute Basophils 0.0 0.0 - 0.2 10e3/uL    Absolute Immature Granulocytes 0.1 <=0.4 10e3/uL    Absolute NRBCs 0.0 10e3/uL        Imaging Results    XR Chest Port 1 View    Result Date: 10/25/2023  EXAM: XR CHEST PORT 1 VIEW LOCATION: [facility] DATE: 10/25/2023 INDICATION: RN placed PICC   verify tip placement COMPARISON: CT 10/25/2023, 1804 hours     IMPRESSION: Right upper extremity PICC tip in the mid SVC. Grossly unchanged emphysema, pulmonary nodules, right hilar adenopathy.    Chest CT w/o  contrast    Result Date: 10/25/2023  EXAM: CT CHEST W/O CONTRAST LOCATION: [facility] DATE: 10/25/2023 INDICATION: Dyspnea, productive cough COMPARISON: 9/28/2023 TECHNIQUE: CT chest without IV contrast. Multiplanar reformats were obtained. Dose reduction techniques were used. CONTRAST: None. FINDINGS: LUNGS AND PLEURA: Slightly decreased size of the peripheral right upper lobe pulmonary nodule measuring 1.7 x 1.4 cm (5/76), previously 1.9 x 1.9 cm, prior to that 2.3 x 2.1 cm. However, there is again increased size of many other satellite nodules bilaterally. For example: -Centrally in the right upper lobe, 2.1 x 2.0 cm (5/60), previously 0.7 x 0.5 cm. -Left lower lobe subpleural nodule, 1.0 x 0.9 cm (5/71), previously 0.7 x 0.7 cm. Advanced emphysema with areas of atelectasis and scarring. Similar pleural thickening and architectural distortion in the anterior lingula. Most of the endobronchial debris has resolved compared to prior. No pleural effusion or pneumothorax. MEDIASTINUM/AXILLAE: Increased size of mediastinal and right hilar lymph nodes compared to 9/20/2023. For example, a pretracheal lymph node now measures 4.2 x 4.1 cm (4/56), previously 2.1 x 1.5 cm. CORONARY ARTERY CALCIFICATION: Mild. UPPER ABDOMEN: Bilateral adrenal masses and right adrenal hemorrhage, better demonstrated on CT 9/29/2023. Small perinephric nodules are new compared to 9/29/2023. MUSCULOSKELETAL: No aggressive or destructive lesions. Unchanged T12 compression deformity.     IMPRESSION: 1.  Worsening metastatic disease with increased size of multiple pulmonary nodules and thoracic lymph nodes compared to 9/28/2023. 2.  Numerous retroperitoneal/perinephric nodules in the visualized upper abdomen appear to be new compared to 9/29/2023 and also worrisome for metastatic disease. Bilateral adrenal nodules and right adrenal hemorrhage were better characterized on prior contrast-enhanced CT.     XR Chest Port 1  View    Result Date: 10/25/2023  EXAM: XR CHEST PORT 1 VIEW LOCATION: [facility] DATE: 10/25/2023 INDICATION: Dyspnea, respiratory failure COMPARISON: Chest x-ray 09/28/2023 and CT chest 09/28/2023     IMPRESSION: Heart size within normal limits. Enlarged right hilum representing adenopathy as seen on CT. Pulmonary vascularity normal. Mid right lung nodule, better appreciated on CT. Subsegmental atelectasis or scarring in the bases. No acute infiltrates or effusions.       Signed by: Devon Suarez MD

## 2023-10-26 NOTE — ED NOTES
Pt called stating that she was feeling SOB.  Pt placed on non-rebreather by NATE Wells for tachypnea and SOB.  Pt appears very anxious with rapid labored breathing.  MD and RT called to bedside.  Pt currently on 15LPM per non-rebreather.  Rns at bedside.  Elinor Penny RN on 10/25/2023 at 9:54 PM

## 2023-10-26 NOTE — PROGRESS NOTES
RESPIRATORY CARE NOTE     Patient Name: Irene León  Today's Date: 10/26/2023       Pt continues to receive duoneb. BS are rhonchi with faint wheeze post neb. Pt is on 3-6 lpm of oxygen via NC, SpO2 is 97%. Post treatment there is increased aeration. Pt also perceives improvement.  RT will continue to monitor and assess.     Rosanne Jaimes, RT

## 2023-10-26 NOTE — PROGRESS NOTES
Patient received scheduled 4ml acetylcysteine 20% neb with 2.5mg Albuterol. Pt tolerated well. No cough.    Pre-neb LS decreased with exp wheezes throughout. Increased aeration post    Vitals on 2LNC post: 96/80/16.    RT to follow as needed

## 2023-10-26 NOTE — PROCEDURES
"PICC Line Insertion Procedure Note     Pt. Name:   Irene León     MRN:          [id number]     Procedure: Insertion of a  Triple Lumen  5 fr  Bard SOLO (valved) Power PICC, Lot number [id number]     Indications: hypotension, ICU  admission     Contraindications : none     Procedure Details:     Patient identified with 2 identifiers and \"Time Out\" conducted.     Central line insertion bundle followed: hand hygiene performed prior to procedure, site cleansed with Cholraprep (CHG), hat, mask, sterile gloves, sterile gown worn, patient draped with maximum barrier head to toe drape, sterile field maintained.     The vein was assessed and found to be compressible and of adequate size.      Lidocaine 1% 1.5 ml administered SQ to the insertion site.      Modified Seldinger Technique (MST) used for insertion, one attempt(s) required to access vein.      A 5 Fr PICC was inserted into the basilic vein of the right upper arm.        Catheter threaded without difficulty. Good blood return noted.     Catheter was flushed with 10 cc normal saline.      Catheter secured with Statlock, Biopatch, and Tegaderm dressing applied.     The sharps that are included in the PICC insertion kit were accounted for and disposed of in the sharps container prior to breakdown of the sterile field.     CLABSI prevention brochure left at bedside.     Patient tolerated procedure well.      Patient's primary RN notified PICC is ready for use.       Findings:     Total catheter length  33 cm, with 0 cm exposed. Mid upper arm circumference is 25 cm.      Tip placement verified in the mid SVC by PCXR.    Comments:  The PICC is properly positioned and ready for use.         Jayda Kraus RN BSN  Vascular Access - [facility]   "

## 2023-10-26 NOTE — ED NOTES
Pt report called to Yoel on 3N.  Pt's family was called and updated on status and that pt is being transferred to the hospital.  Pt to transport when ED support staff available.  Elinor Penny RN on 10/26/2023 at 1:28 AM

## 2023-10-26 NOTE — PROGRESS NOTES
[facility]    Progress Note - Hospitalist Service       Date of Admission:  10/25/2023    Assessment & Plan   Irene León is a 78 year old female admitted on 10/25/2023. She has a history of CHF with reduced ejection fraction (most recently EF 20-25% on 6/22), cardiomyopathy, severe COPD with chronic home oxygen 3 LPM at rest and 5-6 LPM with exertion, CAD, paroxysmal A-fib on Xarelto, CKD stage IV lung cancer on oral chemo and breast cancer on oral chemo who is admitted for acute hypoxic respiratory failure thought related to worsening metastatic disease, COPD.      Dyspnea   Acute on chronic hypoxic respiratory failure   COPD exacerbation  Patient developed acute hypoxia at [facility] on day of admission.  At baseline patient requires around 3 L of oxygen but needed up to 7 L to achieve adequate saturation.  In the ED patient was found to be hypoxic, hypotensive to systolic 80s, tachypneic and required 15 L of oxygen per OxyMask.  Blood pressure recovered after 500 cc fluid bolus x2. Chest CT demonstrates severe emphysema, worsening metastatic disease compared to previous imaging. Although patient had WBC 15 and mildly peaked procal to 0.11, chest xray and CT not consistent with focal pneumonia/pneumonitis. There was diffuse wheezing on lung exam, thus hypoxia better explained by COPD exacerbation. Given concern for worsening metastatic disease, family wishes to keep patient care comfort focused until she can be safely discharged home.  WBC increased to 21.2 today, likely caused partially by steroids.  -Follow blood culture  -Continue duonebs Q4 hours   -Mucomyst as needed   -RT consulted; appreciate cares   -Supplemental oxygen, goal SPO2 88%-92%   -Continuous pulse oximetry   -Prednisone 40 mg daily   -BiPAP if needed but DNR/DNI  -CBC, CMP a.m.     History of congestive heart failure, EF 25%  BNP 1,845. Hypoxemia less likely due to CHF exacerbation given BNP  significantly higher than 1800 on last admission (approx 6000), and chest xray, exam does not suggest she is fluid overloaded.   -Caution with IVF   -Cardiac tele   -Monitor oxygen saturation as above      History of non-small cell lung cancer  History of Breast cancer   Bilateral Adrenal masses  Recent R sided adrenal hemorrhage  New mass of the back  Right-sided visual impairment  Daughter notes family recently found a mass on patient's back last week.  Patient complains of pain on palpation, or if she lays on it for too long.  Family is concerned about cancer metastasis.   Daughter noted she had noticed some new visual impairment in the patient yesterday/today.  Stating patient was struggling to see things clearly on her right side.  From exam, unlikely to be a stroke.  Discussed imaging options such as CT or MRI to look for possible metastases, the patient and family declined this option as they wanted to discuss with Dr. Suarez, her oncologist, before proceeding with imaging. Discussed patient with Dr. Suarez, she will reach out to the patient/family to discuss care and is planning to stop by after 4 PM today.  -Pulse oximetry   -Comfort focused    -Palliative care consult  -Possible brain MRI  -Change diet to general diet        Diet: Regular Diet Adult    DVT Prophylaxis: DOAC  Hayes Catheter: Not present  Fluids: P.o.  Lines: PRESENT      PICC 10/25/23 Triple Lumen Right Basilic-Site Assessment: WDL      Cardiac Monitoring: ACTIVE order. Indication: Tachyarrhythmias, acute (48 hours)  Code Status: No CPR- Do NOT Intubate      Clinically Significant Risk Factors Present on Admission           # Hypercalcemia: corrected calcium is >10.1, will monitor as appropriate    # Hypoalbuminemia: Lowest albumin = 2.9 g/dL at 10/26/2023  5:06 AM, will monitor as appropriate    # Drug Induced Coagulation Defect: home medication list includes an anticoagulant medication    # Hypertension: Noted on problem list  # Chronic heart  failure with reduced ejection fraction: last echo with EF <40%         # COPD: noted on problem list        Disposition Plan     Expected Discharge Date: 10/27/2023                The patient's care was discussed with the Attending Physician, Dr. Puri .    Kriss Franz MD PGY1  Hospitalist Service  [facility]  Securely message with [employer] (more info)  Text page via [facility] Paging/Directory   ______________________________________________________________________    Interval History   The patient was admitted overnight.  Patient stating that she is able to breathe a lot better today than on admission yesterday. She denies chest pain, abdominal pain, nausea, vomiting, lightheadedness, headache.  She states she has a slight ache on her back, alluding to the mass on her back.  We discussed some goals of care and their wishes to speak to Dr. Suarez her oncologist.  The daughter Leelee, had a concern about her mother's vision, she stated she feels like her mom is not seeing very clearly out of her right eye today.  The patient denies blurry vision, loss of vision, or change in vision.    Physical Exam   Vital Signs: Temp: 98  F (36.7  C) Temp src: Oral BP: 138/61 Pulse: 101   Resp: 23 SpO2: 97 % O2 Device: Nasal cannula Oxygen Delivery: 6 LPM  Weight: 107 lbs 5.82 oz    Constitutional: awake, alert, cooperative, no apparent distress, and appears stated age  Eyes: Lids and lashes normal, pupils equal, round and reactive to light, extra ocular muscles intact, sclera clear, conjunctiva normal.  Respiratory: Tachypneic, but no increased work of breathing, bilateral diffuse expiratory wheezing, no crackles, good air movement  Cardiovascular: Regular rate and rhythm, normal S1 and S2, and no murmur noted  GI: Normal bowel sounds, soft, non-distended, non-tender, no masses palpated  Skin: Hard, movable growth approximately 5 cm on mid lateral left posterior back with ecchymosis  Neurologic: Awake,  alert, oriented to name, place and time.  Cranial nerves II-XII are grossly intact.  Motor is 3 out of 5 bilaterally.  Cerebellar finger to nose abnormal to the right side. Sensory is intact.  Visual field test abnormal on all quadrants on right side, patient stating she can see, but unable to accurately describe things in her right visual field.  No visible facial drooping, symmetrical smile.  Neuropsychiatric: General: normal, calm, and inconsistent eye contact/searching  Level of consciousness: alert / normal  Affect: pleasant  Orientation: oriented to self, place, time and situation  Memory and insight: normal, memory for past and recent events intact, and thought process normal    Data     I have personally reviewed the following data over the past 24 hrs:    21.2 (H)  \   8.8 (L)   / 170     141 107 18.5 /  144 (H)   3.7 25 1.22 (H) \     ALT: <5 AST: 21 AP: 46 TBILI: 0.3   ALB: 2.9 (L) TOT PROTEIN: 6.3 (L) LIPASE: N/A     Trop: 31 (H) BNP: 1,845 (H)     Procal: 0.11 (H) CRP: N/A Lactic Acid: 1.2       INR:  1.15 PTT:  N/A   D-dimer:  N/A Fibrinogen:  N/A       Imaging results reviewed over the past 24 hrs:   Recent Results (from the past 24 hour(s))   XR Chest Port 1 View    Narrative    EXAM: XR CHEST PORT 1 VIEW  LOCATION: [facility]  DATE: 10/25/2023    INDICATION: Dyspnea, respiratory failure  COMPARISON: Chest x-ray 09/28/2023 and CT chest 09/28/2023      Impression    IMPRESSION: Heart size within normal limits. Enlarged right hilum representing adenopathy as seen on CT. Pulmonary vascularity normal. Mid right lung nodule, better appreciated on CT. Subsegmental atelectasis or scarring in the bases. No acute   infiltrates or effusions.   Chest CT w/o contrast    Narrative    EXAM: CT CHEST W/O CONTRAST  LOCATION: [facility]  DATE: 10/25/2023    INDICATION: Dyspnea, productive cough  COMPARISON: 9/28/2023  TECHNIQUE: CT chest without IV contrast.  Multiplanar reformats were obtained. Dose reduction techniques were used.  CONTRAST: None.    FINDINGS:    LUNGS AND PLEURA: Slightly decreased size of the peripheral right upper lobe pulmonary nodule measuring 1.7 x 1.4 cm (5/76), previously 1.9 x 1.9 cm, prior to that 2.3 x 2.1 cm. However, there is again increased size of many other satellite nodules   bilaterally. For example:  -Centrally in the right upper lobe, 2.1 x 2.0 cm (5/60), previously 0.7 x 0.5 cm.  -Left lower lobe subpleural nodule, 1.0 x 0.9 cm (5/71), previously 0.7 x 0.7 cm.    Advanced emphysema with areas of atelectasis and scarring. Similar pleural thickening and architectural distortion in the anterior lingula. Most of the endobronchial debris has resolved compared to prior. No pleural effusion or pneumothorax.    MEDIASTINUM/AXILLAE: Increased size of mediastinal and right hilar lymph nodes compared to 9/20/2023. For example, a pretracheal lymph node now measures 4.2 x 4.1 cm (4/56), previously 2.1 x 1.5 cm.    CORONARY ARTERY CALCIFICATION: Mild.    UPPER ABDOMEN: Bilateral adrenal masses and right adrenal hemorrhage, better demonstrated on CT 9/29/2023. Small perinephric nodules are new compared to 9/29/2023.    MUSCULOSKELETAL: No aggressive or destructive lesions. Unchanged T12 compression deformity.      Impression    IMPRESSION:  1.  Worsening metastatic disease with increased size of multiple pulmonary nodules and thoracic lymph nodes compared to 9/28/2023.    2.  Numerous retroperitoneal/perinephric nodules in the visualized upper abdomen appear to be new compared to 9/29/2023 and also worrisome for metastatic disease. Bilateral adrenal nodules and right adrenal hemorrhage were better characterized on prior   contrast-enhanced CT.     XR Chest Port 1 View    Narrative    EXAM: XR CHEST PORT 1 VIEW  LOCATION: [facility]  DATE: 10/25/2023    INDICATION: RN placed PICC   verify tip placement  COMPARISON: CT  10/25/2023, 1804 hours      Impression    IMPRESSION: Right upper extremity PICC tip in the mid SVC. Grossly unchanged emphysema, pulmonary nodules, right hilar adenopathy.

## 2023-10-26 NOTE — PROGRESS NOTES
[facility]: Cancer Care                                                                                          Message from Bianca Coates, Cancer Care Triage Nurse, stating daughter, Leelee, called stating the care facility was only giving patient 6 tablets of lumakras, not 8 that it was increased to on 10/12.  She is needing printed confirmation that fax sent because care facility said they didn't receive the new orders.    Called Leleee back and let her know that this writer personally faxed new orders to [facility] on 10/12/23 at 1608 to [phone].  Right Fax confirmed sent. Told her also faxed lab orders on 10/13/23 at 1002 to [phone] and Right Fax confirmed sent.  Labs drawn on 10/24/23. Leelee requesting something in print to show confirmation fax sent to give to the care facility because they are telling her new orders never received.    Printed fax history for both the 10/12/23 1608 and the 10/13/23 1002 faxes and took them to the patient who is currently admitted to #[id number].  Information given to Leelee. She expressed appreciation for this paperwork.  She also had the bottle of Lumakras with her and instructions say to take 8 tablets daily. She said staff dispense medication to patient from this bottle.    Aleksander from [facility] called seeking clarification of fax sent with new medication orders.  He said the only fax they received was for lab orders on 10/13/23.  Explained new medication orders were faxed by this writer on 10/12/23 at 1608 and confirmed sent on our end.  Explained daughter also brought in new bottle of Lumakras and the orders on the bottle say to take 8 tablets daily.  He said they go by the most recent orders, not what the bottle says because that can change and their most recent orders say to take 6 tablets daily.  Explained new Lumakras orders were faxed on 10/12/23 at 1608.  He asked if fax was sent to [phone]. Confirmed yes.  He said he will look  into this further.    Signature:  Erin Nogueira, RN

## 2023-10-26 NOTE — TELEPHONE ENCOUNTER
Voicemail received from patient's daughter Leelee. Patient as admitted into the hospital yesterday. They were notified by pharmacy that patient was not getting the correct dose of Lumakras as directed by Dr. Suarez. Patient was supposed to be getting 8 tablets daily instead of 6. Leelee is wondering when the dose was increased so she can figure out where the mistake happened.    Per chart review, Lumakras dose was increased at the office visit on 10/12. Per patient outreach encounter on 10/12 per Erin Nogueira RN care coordinator orders were faxed to care facility with change.     Return call placed to Leelee, there was no answer and mailbox is full. There is a consent to communicate on file for Leelee.    Bianca Coates RN

## 2023-10-26 NOTE — PLAN OF CARE
Goal Outcome Evaluation:    Patient VSS, 3L NC after arriving on unit, A&O. Reported 6/10 back pain, managed with prn tramadol. Rested with eyes closed. SR w/BBB on tele. No significant occurrences during shift.    Problem: Gas Exchange Impaired  Goal: Optimal Gas Exchange  Outcome: Progressing  Intervention: Optimize Oxygenation and Ventilation  Recent Flowsheet Documentation  Taken 10/26/2023 0315 by Homero Toledo, RN  Head of Bed (HOB) Positioning: HOB at 20-30 degrees     Problem: Pain Acute  Goal: Optimal Pain Control and Function  Outcome: Progressing  Intervention: Prevent or Manage Pain  Recent Flowsheet Documentation  Taken 10/26/2023 0315 by Homero Toledo, RN  Medication Review/Management: medications reviewed  Taken 10/26/2023 0159 by Homero Toledo, RN  Medication Review/Management: medications reviewed

## 2023-10-26 NOTE — PROGRESS NOTES
"BP (!) 140/60   Pulse 100   Temp 98.6  F (37  C) (Temporal)   Resp (!) 38   SpO2 91%     I was called to the room for respiratory distress. The PT was repeating \"can't breath.\" The RR was into the high 20's, if not low 30's, and moderate to severe accessory muscle use was apparent. While BIPAP was discussed, the MD order a neb and light sedation. Pre and post neb, BS were clear but very diminished. Also, post neb (and light sedation), her WOB was less (though still elevated). RT will follow as directed.  "

## 2023-10-26 NOTE — CONSULTS
Palliative Care Consultation Note  [facility]      Patient: Irene León  Date of Admission:  10/25/2023    Requesting Clinician / Team: Dr. Diaz  Reason for consult: Goals of care  Decisional support     Recommendations & Counseling     GOALS OF CARE:   Life-prolonging with limits  - DNR/I  Patient continues to identify desires for ongoing cancer directed treatments.  Plans to discuss this further with Dr. Suarez later today.  See goals of care discussion below that was had with family.  Discussed that its not only progression of cancer, is also the COPD and heart failure with EF of 20-25% contributing to overall decline and decompensation.  Patient expresses distress about dying and leaving her children after recent death of her  a few weeks ago.  Patient desires to continue with sotorasib and taking home medication-oral immunotherapy.  Oncology-hematology consult ordered.  Spoke with Dr. Suarez directly.  Patient has additional questions regarding cancer directed treatments and her overall impression of patient's condition.  Recommend following up with outpatient palliative care upon discharge - established patient there with Dr. Miguel.    ADVANCE CARE PLANNING:  No health care directive on file. Per  informed consent policy, next of kin should be involved if patient becomes unable.  There is a POLST form on file, this was reviewed and current.  Code status: No CPR- Do NOT Intubate    MEDICAL MANAGEMENT:   #Dyspnea, CHF exacerbation and COPD  Oxygen  Repositioning  Management per hospital medicine service  Mucomyst, albuterol and DuoNeb nebulizers  Bumex 1 mg p.o. twice daily  Prednisone 40 mg p.o. daily    #Generalized weakness and fatigue secondary to CHF exacerbation, COPD, breast and lung cancer  Recommend PT and OT when more medically stable  At this time patient desires ongoing strengthening at TCU as has life-prolonging goals.    #history of Chronic pain 2/2 compression  "fracture  Continue tramadol 50 mg po every 3 times a day PRN, and tylenol PRN.       #Insomnia  Continues with zyprexa 5 mg at HS and melatonin 10 mg at HS PRN  Would use caution with adding any other medications that can prolong QTC.  Would avoid benzodiazepine due to potential adverse effects.  If goals of care transition to comfort focused, then weigh risk vs benefit.    PSYCHOSOCIAL/SPIRITUAL:  Family daughter, Leelee, son, Yovanny, son-in-law, daughter-in-law and grandchildren  [place] community: [facility]   Would appreciate Spiritual Health Services    Palliative Care will continue to follow. Thank you for the consult and allowing us to aid in the care of Irene León.    These recommendations have been discussed with Dr. Suarez.    LEYLA Walker, DELMIS, CNS, AOCNS  Securely message with Checkr (more info)  Text page via [facility] Paging/Directory       Palliative Summary/HPI     Irene León is a 78 year old female with a  PMH of nonischemic cardiomyopathy LVEF 20-25%, (on 6/22)  minimal coronary disease per angiogram, paroxysmal atrial fibrillation on Xarelto, lung cancer, COPD, chronic hypoxic respiratory failure on 3 L of home O2 at rest and 5-6L with exertion, chronic kidney disease who presented to the ED on 10/25/2023 with complaints of increased SOB and acute hypoxic respiratory failure. Patient recently hospitalized from 9/23/2023 - 10/5/2023 with acute hypoxic respiratory failure and CHF exacerbation and was discharged to TCU with hopes of strengthening.  Admisssion CT scan demonstrating \"Worsening metastatic disease with increased size of multiple pulmonary nodules and thoracic lymph nodes compared to 9/28/2023.  2.  Numerous retroperitoneal/perinephric nodules in the visualized upper abdomen appear to be new compared to 9/29/2023 and also worrisome for metastatic disease. Bilateral adrenal nodules and right adrenal hemorrhage were better characterized on prior   contrast-enhanced CT.\"     "   Recurrent hospitalizations-this is her 6th admission since 6/2023.   She is followed in outpatient palliative care clinic, last seen on 8/15/23.    Palliative Care Summary:   Met with Patient, daughter, Leelee, son, Yovanny (lives in [place] and is in town) son-in-law, daughter-in-law and grandson.   I introduced our role as an extra layer of support and how we help patients and families dealing with serious, potentially life-limiting illnesses. I explained the composition of the palliative care team.  Palliative care helps patients and families navigate their care while focusing on the whole person; providing emotional, social and spiritual support  Palliative care often assists with symptom management, information sharing about what to expect from the illness, available treatment options and what effect those options may have on the disease course, and provide effective communication and caring support.    Prognosis, Goals, & Planning:    Functional Status just prior to this current hospitalization:  has been hospitalized 6th times in the past 4-5 months for recurrent COPD and CHF exacerbations and acute hypoxic respiratory failure.   ECOG3 (Capable of only limited self-care; needs help with ADLs; in bed/chair >50% of waking hours)    Prognosis, Goals, and/or Advance Care Planning:  We discussed general treatment options (full/restorative, selective/conservatives, and comfort only/hospice). We then discussed how these specifically apply to Zina.  Based on this discussion, patient has decided to continue all current cares and treatments to see how she will improve.  Education provided regarding hospice philosophy, prognostic,and eligibility criteria. Discussed what services are provided and those that are not,  Discussed common misconceptions. We explored the various disposition options where they can receive hospice care (home, residential hospice homes, LTC with hospice) including subsequent financial and  familial implications. Discussed typical anticipated timing of discharge.  Patient and family express frustration regarding care received at TCU as patient's dose of Sotorasib was half the dose as was indicated for her to take upon discharge.  Patient had been receiving a lower dose and has expressed concern that disease has progressed on lower dose.  She expresses desires to inquire to Dr. Suarez whether ongoing cancer directed treatments at appropriate dose would be of benefit.  Time spent listening and supporting Stephanie and her family.  Patient's  passed here at [facility] while she was also hospitalized here at the end of September 2023.  With her permission, reviewed that not only are her cancer is a concern also her end-stage COPD and heart failure with EF of 20 to 25% contributing to ongoing physical decline and overall poor prognosis.  Patient expresses thoughts of grief from losing her  as well as fears dying and leaving her family alone so close to her 's death.    Code Status was addressed today:   Patient confirms desires for DO NOT RESUSCITATE and DO NOT INTUBATE.    Patient's decision making preferences: independently        Patient has decision-making capacity today for complex decisions:Intact            Coping, Meaning, & Spirituality:   Mood, coping, and/or meaning in the context of serious illness were addressed today: Yes    Social:   Living situation: Was previously living in her own home with her  who suffered from dementia.   took care of patient along with daughter and son-in-law frequently checking in throughout the day on both parents.  Upon discharge 10/5/2023, patient discharged to TCU and was then readmitted 20 days later.  Important relationships/caregivers: 2 children, son-in-law, daughter-in-law and grandchildren     Past Medical History:  Past Medical History:   Diagnosis Date    ANATOLIY (acute kidney injury) (H24)     Allergic rhinitis      Arrhythmia     Atrial fibrillation with RVR (H)     Bacteremia     Breast cancer (H) 2017    Cardiomyopathy (H)     Centrilobular emphysema (H)     CHF (congestive heart failure) (H)     Chronic kidney disease     CKD (chronic kidney disease)     COPD (chronic obstructive pulmonary disease) (H)     Coronary artery disease     Depression     Dysphagia     E. coli sepsis (H)     Factor 5 Leiden mutation, heterozygous (H24)     GERD (gastroesophageal reflux disease)     Heart failure with reduced ejection fraction (H) 04/17/2023    Hx of radiation therapy 2017    Hyperlipidemia     Hypertension     Hypokalemia     Hypomagnesemia     Idiopathic cardiomyopathy (H)     Left hip pain 04/30/2014    OAB (overactive bladder)     Osteoporosis     Sacral insufficiency fracture     Sinusitis     Syncope     Urge incontinence     Vocal cord dysfunction         Past Surgical History:  Past Surgical History:   Procedure Laterality Date    ARTHROPLASTY REVISION HIP Left     BIOPSY BREAST Right 2017    BLADDER SURGERY      bladder interstim with removal    CARDIAC CATHETERIZATION      CATARACT EXTRACTION Left 07/18/2017    IR ABDOMINAL AORTOGRAM  4/16/2003    IR AORTIC ARCH 4 VESSEL ANGIOGRAM  4/16/2003    IR MISCELLANEOUS PROCEDURE  4/16/2003    LUMPECTOMY BREAST Left 06/2017    x2    PICC DOUBLE LUMEN PLACEMENT  4/11/2022         PICC TRIPLE LUMEN PLACEMENT  4/7/2022         PICC TRIPLE LUMEN PLACEMENT  10/25/2023    ZZC OPEN TX FEMORAL FRACTURE DISTAL MED/LAT CONDYLE Left 10/28/2015    Procedure: OPEN REDUCTION INTERNAL FIXATION LEFT  PROXIMAL FEMUR PERIPROSTHETIC FRACTURE;  Surgeon: Yovanny Albarran MD;  Location: [facility];  Service: Orthopedics         Family History:  Family History   Problem Relation Age of Onset    Osteoporosis Other     Prostate Cancer Brother     Breast Cancer Maternal Aunt         age thought to be in her 70's-80's    Prostate Cancer Maternal Uncle         Medications:  I have reviewed this  patient's medication profile and medications from this hospitalization.     ROS:  Comprehensive ROS is reviewed and is negative except as here & per HPI:   Pain: Suffers from chronic pain pain currently at 5/10 in right back  Dyspnea: Mild with speaking.  Worsens with exertion requiring increase in oxygen to 5-6 L  Anxiety: Intermittent, situational  Nausea: No  Weakness/Fatigue: Moderate  Constipation: Denies    PHYSICAL EXAM:  Temp:  [97.4  F (36.3  C)-98.6  F (37  C)] 97.9  F (36.6  C)  Pulse:  [] 80  Resp:  [17-62] 25  BP: ()/(51-75) 144/75  FiO2 (%):  [28 %] 28 %  SpO2:  [91 %-99 %] 92 %  Wt Readings from Last 3 Encounters:   10/26/23 48.7 kg (107 lb 5.8 oz)   10/19/23 49.4 kg (109 lb)   10/18/23 49.9 kg (110 lb)      General appearance: alert, cachectic, cooperative, fatigued, and no distress  Head: Normocephalic, without obvious abnormality, atraumatic, temporal wasting noted  Eyes: Lids and lashes normal.  Sclera anicteric.  Nose: no discharge, nasal cannula in place  Throat: Oral mucosa moist.  Lips without lesions.  Lungs: Clear and decreased bases bilaterally  Heart: Regular rate, no murmur noted  Abdomen: Soft, non-tender  Extremities: Warm, no cyanosis.  Trace ankle edema noted.  Neurologic: Alert.  Oriented to person, place, situation and year.    Data reviewed:  Results for orders placed or performed during the hospital encounter of 10/25/23 (from the past 24 hour(s))   ECG 12-LEAD WITH MUSE (LHE)   Result Value Ref Range    Systolic Blood Pressure  mmHg    Diastolic Blood Pressure  mmHg    Ventricular Rate 85 BPM    Atrial Rate 85 BPM    OH Interval 262 ms    QRS Duration 122 ms     ms    QTc 504 ms    P Axis 75 degrees    R AXIS -79 degrees    T Axis 95 degrees    Interpretation ECG       Sinus rhythm with 1st degree A-V block  Left axis deviation  Non-specific intra-ventricular conduction delay  Nonspecific ST and T wave abnormality  Abnormal ECG  When compared with ECG of  30-SEP-2023 05:09,  CT interval has increased  Confirmed by SEE ED PROVIDER NOTE FOR, ECG INTERPRETATION ([id number]),  Dorinda Watkins ([id number]) on 10/25/2023 6:25:07 PM     CBC with platelets + differential    Narrative    The following orders were created for panel order CBC with platelets + differential.  Procedure                               Abnormality         Status                     ---------                               -----------         ------                     CBC with platelets and d...[[id number]]  Abnormal            Final result                 Please view results for these tests on the individual orders.   Basic metabolic panel   Result Value Ref Range    Sodium 139 135 - 145 mmol/L    Potassium 3.8 3.4 - 5.3 mmol/L    Chloride 102 98 - 107 mmol/L    Carbon Dioxide (CO2) 28 22 - 29 mmol/L    Anion Gap 9 7 - 15 mmol/L    Urea Nitrogen 17.5 8.0 - 23.0 mg/dL    Creatinine 1.26 (H) 0.51 - 0.95 mg/dL    GFR Estimate 43 (L) >60 mL/min/1.73m2    Calcium 9.5 8.8 - 10.2 mg/dL    Glucose 93 70 - 99 mg/dL   Troponin T, High Sensitivity (now)   Result Value Ref Range    Troponin T, High Sensitivity 31 (H) <=14 ng/L   N terminal pro BNP outpatient   Result Value Ref Range    N Terminal Pro BNP Outpatient 1,845 (H) 0 - 1,800 pg/mL   Blood gas venous   Result Value Ref Range    pH Venous 7.40 7.35 - 7.45    pCO2 Venous 51 (H) 35 - 50 mm Hg    pO2 Venous 21 (L) 25 - 47 mm Hg    Bicarbonate Venous 32 (H) 24 - 30 mmol/L    Base Excess/Deficit 6.7   mmol/L    Oxyhemoglobin Venous 29.9 (L) 70.0 - 75.0 %    O2 Sat, Venous 30.3 (L) 70.0 - 75.0 %   INR   Result Value Ref Range    INR 1.15 0.85 - 1.15   Hepatic function panel   Result Value Ref Range    Protein Total 7.7 6.4 - 8.3 g/dL    Albumin 3.5 3.5 - 5.2 g/dL    Bilirubin Total 0.3 <=1.2 mg/dL    Alkaline Phosphatase 54 35 - 104 U/L    AST 27 0 - 45 U/L    ALT <5 0 - 50 U/L    Bilirubin Direct <0.20 0.00 - 0.30 mg/dL   Procalcitonin   Result Value Ref Range     Procalcitonin 0.11 (H) <0.05 ng/mL   CBC with platelets and differential   Result Value Ref Range    WBC Count 15.0 (H) 4.0 - 11.0 10e3/uL    RBC Count 3.71 (L) 3.80 - 5.20 10e6/uL    Hemoglobin 10.6 (L) 11.7 - 15.7 g/dL    Hematocrit 34.6 (L) 35.0 - 47.0 %    MCV 93 78 - 100 fL    MCH 28.6 26.5 - 33.0 pg    MCHC 30.6 (L) 31.5 - 36.5 g/dL    RDW 14.4 10.0 - 15.0 %    Platelet Count 205 150 - 450 10e3/uL    % Neutrophils 74 %    % Lymphocytes 11 %    % Monocytes 11 %    % Eosinophils 3 %    % Basophils 0 %    % Immature Granulocytes 1 %    NRBCs per 100 WBC 0 <1 /100    Absolute Neutrophils 11.2 (H) 1.6 - 8.3 10e3/uL    Absolute Lymphocytes 1.6 0.8 - 5.3 10e3/uL    Absolute Monocytes 1.6 (H) 0.0 - 1.3 10e3/uL    Absolute Eosinophils 0.4 0.0 - 0.7 10e3/uL    Absolute Basophils 0.1 0.0 - 0.2 10e3/uL    Absolute Immature Granulocytes 0.1 <=0.4 10e3/uL    Absolute NRBCs 0.0 10e3/uL   Symptomatic Influenza A/B, RSV, & SARS-CoV2 PCR (COVID-19) Nasopharyngeal    Specimen: Nasopharyngeal; Swab   Result Value Ref Range    Influenza A PCR Negative Negative    Influenza B PCR Negative Negative    RSV PCR Negative Negative    SARS CoV2 PCR Negative Negative    Narrative    Testing was performed using the Xpert Xpress CoV2/Flu/RSV Assay on the Cepheid GeneXpert Instrument. This test should be ordered for the detection of SARS-CoV-2, influenza, and RSV viruses in individuals who meet clinical and/or epidemiological criteria. Test performance is unknown in asymptomatic patients. This test is for in vitro diagnostic use under the FDA EUA for laboratories certified under CLIA to perform high or moderate complexity testing. This test has not been FDA cleared or approved. A negative result does not rule out the presence of PCR inhibitors in the specimen or target RNA in concentration below the limit of detection for the assay. If only one viral target is positive but coinfection with multiple targets is suspected, the sample should be  re-tested with another FDA cleared, approved, or authorized test, if coinfection would change clinical management. This test was validated by the [facility]. These laboratories are certified under the Clinical Laboratory Improvement Amendments of 1988 (CLIA-88) as qualified to perform high complexity laboratory testing.   XR Chest Port 1 View    Narrative    EXAM: XR CHEST PORT 1 VIEW  LOCATION: [facility]  DATE: 10/25/2023    INDICATION: Dyspnea, respiratory failure  COMPARISON: Chest x-ray 09/28/2023 and CT chest 09/28/2023      Impression    IMPRESSION: Heart size within normal limits. Enlarged right hilum representing adenopathy as seen on CT. Pulmonary vascularity normal. Mid right lung nodule, better appreciated on CT. Subsegmental atelectasis or scarring in the bases. No acute   infiltrates or effusions.   Chest CT w/o contrast    Narrative    EXAM: CT CHEST W/O CONTRAST  LOCATION: [facility]  DATE: 10/25/2023    INDICATION: Dyspnea, productive cough  COMPARISON: 9/28/2023  TECHNIQUE: CT chest without IV contrast. Multiplanar reformats were obtained. Dose reduction techniques were used.  CONTRAST: None.    FINDINGS:    LUNGS AND PLEURA: Slightly decreased size of the peripheral right upper lobe pulmonary nodule measuring 1.7 x 1.4 cm (5/76), previously 1.9 x 1.9 cm, prior to that 2.3 x 2.1 cm. However, there is again increased size of many other satellite nodules   bilaterally. For example:  -Centrally in the right upper lobe, 2.1 x 2.0 cm (5/60), previously 0.7 x 0.5 cm.  -Left lower lobe subpleural nodule, 1.0 x 0.9 cm (5/71), previously 0.7 x 0.7 cm.    Advanced emphysema with areas of atelectasis and scarring. Similar pleural thickening and architectural distortion in the anterior lingula. Most of the endobronchial debris has resolved compared to prior. No pleural effusion or pneumothorax.    MEDIASTINUM/AXILLAE: Increased size of  mediastinal and right hilar lymph nodes compared to 9/20/2023. For example, a pretracheal lymph node now measures 4.2 x 4.1 cm (4/56), previously 2.1 x 1.5 cm.    CORONARY ARTERY CALCIFICATION: Mild.    UPPER ABDOMEN: Bilateral adrenal masses and right adrenal hemorrhage, better demonstrated on CT 9/29/2023. Small perinephric nodules are new compared to 9/29/2023.    MUSCULOSKELETAL: No aggressive or destructive lesions. Unchanged T12 compression deformity.      Impression    IMPRESSION:  1.  Worsening metastatic disease with increased size of multiple pulmonary nodules and thoracic lymph nodes compared to 9/28/2023.    2.  Numerous retroperitoneal/perinephric nodules in the visualized upper abdomen appear to be new compared to 9/29/2023 and also worrisome for metastatic disease. Bilateral adrenal nodules and right adrenal hemorrhage were better characterized on prior   contrast-enhanced CT.     XR Chest Port 1 View    Narrative    EXAM: XR CHEST PORT 1 VIEW  LOCATION: [facility]  DATE: 10/25/2023    INDICATION: RN placed PICC   verify tip placement  COMPARISON: CT 10/25/2023, 1804 hours      Impression    IMPRESSION: Right upper extremity PICC tip in the mid SVC. Grossly unchanged emphysema, pulmonary nodules, right hilar adenopathy.   Lactic acid whole blood   Result Value Ref Range    Lactic Acid 1.2 0.7 - 2.0 mmol/L   UA with Microscopic reflex to Culture    Specimen: Urine, NOS   Result Value Ref Range    Color Urine Yellow Colorless, Straw, Light Yellow, Yellow    Appearance Urine Clear Clear    Glucose Urine Negative Negative mg/dL    Bilirubin Urine Negative Negative    Ketones Urine Trace (A) Negative mg/dL    Specific Gravity Urine 1.022 1.001 - 1.030    Blood Urine Negative Negative    pH Urine 6.5 5.0 - 7.0    Protein Albumin Urine 10 (A) Negative mg/dL    Urobilinogen Urine <2.0 <2.0 mg/dL    Nitrite Urine Negative Negative    Leukocyte Esterase Urine Negative Negative    RBC  Urine 1 <=2 /HPF    WBC Urine 1 <=5 /HPF    Squamous Epithelials Urine <1 <=1 /HPF    Narrative    Urine Culture not indicated   CBC with platelets differential    Narrative    The following orders were created for panel order CBC with platelets differential.  Procedure                               Abnormality         Status                     ---------                               -----------         ------                     CBC with platelets and d...[[id number]]  Abnormal            Final result                 Please view results for these tests on the individual orders.   Comprehensive metabolic panel   Result Value Ref Range    Sodium 141 135 - 145 mmol/L    Potassium 3.7 3.4 - 5.3 mmol/L    Carbon Dioxide (CO2) 25 22 - 29 mmol/L    Anion Gap 9 7 - 15 mmol/L    Urea Nitrogen 18.5 8.0 - 23.0 mg/dL    Creatinine 1.22 (H) 0.51 - 0.95 mg/dL    GFR Estimate 45 (L) >60 mL/min/1.73m2    Calcium 8.2 (L) 8.8 - 10.2 mg/dL    Chloride 107 98 - 107 mmol/L    Glucose 144 (H) 70 - 99 mg/dL    Alkaline Phosphatase 46 35 - 104 U/L    AST 21 0 - 45 U/L    ALT <5 0 - 50 U/L    Protein Total 6.3 (L) 6.4 - 8.3 g/dL    Albumin 2.9 (L) 3.5 - 5.2 g/dL    Bilirubin Total 0.3 <=1.2 mg/dL   CBC with platelets and differential   Result Value Ref Range    WBC Count 21.2 (H) 4.0 - 11.0 10e3/uL    RBC Count 3.02 (L) 3.80 - 5.20 10e6/uL    Hemoglobin 8.8 (L) 11.7 - 15.7 g/dL    Hematocrit 28.2 (L) 35.0 - 47.0 %    MCV 93 78 - 100 fL    MCH 29.1 26.5 - 33.0 pg    MCHC 31.2 (L) 31.5 - 36.5 g/dL    RDW 14.6 10.0 - 15.0 %    Platelet Count 170 150 - 450 10e3/uL    % Neutrophils 90 %    % Lymphocytes 4 %    % Monocytes 5 %    % Eosinophils 0 %    % Basophils 0 %    % Immature Granulocytes 1 %    NRBCs per 100 WBC 0 <1 /100    Absolute Neutrophils 19.2 (H) 1.6 - 8.3 10e3/uL    Absolute Lymphocytes 0.8 0.8 - 5.3 10e3/uL    Absolute Monocytes 1.1 0.0 - 1.3 10e3/uL    Absolute Eosinophils 0.0 0.0 - 0.7 10e3/uL    Absolute Basophils 0.0 0.0 - 0.2  10e3/uL    Absolute Immature Granulocytes 0.1 <=0.4 10e3/uL    Absolute NRBCs 0.0 10e3/uL     *Note: Due to a large number of results and/or encounters for the requested time period, some results have not been displayed. A complete set of results can be found in Results Review.        90 MINUTES SPENT BY ME on the date of service doing chart review, history, exam, documentation & further activities per the note.

## 2023-10-26 NOTE — PROGRESS NOTES
Received call on nurse triage line from Aleksander at [facility] asking us to re-fax orders from 10/12/23 as well as conformation of fax. Items were faxed to fax number provided by Aleksander ([phone]). Confirmed via RightFax at 4:24 on 10/26/23.      Leeele Escobedo RN...............................10/26/23  4:30 PM

## 2023-10-26 NOTE — CONSULTS
Care Management Initial Consult    General Information  Assessment completed with: Patient, Family, Pt, Leelee Nilay, Rafiq, grandlatonya  Type of CM/SW Visit: Initial Assessment    Primary Care Provider verified and updated as needed: Yes   Readmission within the last 30 days: other (see comments)   Return Category: Progression of disease     Advance Care Planning:            Communication Assessment  Patient's communication style: spoken language (English or Bilingual)    Hearing Difficulty or Deaf: no   Wear Glasses or Blind: no    Cognitive  Cognitive/Neuro/Behavioral: WDL                      Living Environment:   People in home: alone     Current living Arrangements: other (see comments)  [facility] Facility: [facility]   Able to return to prior arrangements: yes       Family/Social Support:  Care provided by:    Provides care for:    Marital Status:   Children          Description of Support System: Supportive         Current Resources:   Patient receiving home care services:       Community Resources:    Equipment currently used at home: walker, rolling, cane, straight, wheelchair, manual  Supplies currently used at home:      Employment/Financial:  Employment Status: retired         Lifestyle & Psychosocial Needs:  Social Determinants of Health     Food Insecurity: Not on file   Depression: At risk (7/12/2023)    PHQ-2     PHQ-2 Score: 5   Housing Stability: Not on file   Tobacco Use: Medium Risk (10/25/2023)    Patient History     Smoking Tobacco Use: Former     Smokeless Tobacco Use: Former     Passive Exposure: Past   Financial Resource Strain: Not on file   Alcohol Use: Not on file   Transportation Needs: Not on file   Physical Activity: Not on file   Interpersonal Safety: Not on file   Stress: Not on file   Social Connections: Not on file         Additional Information:  Pt is currently being followed by Palliative Care and Oncology. CM spoke with Pt, Grandson, Son Nilay & his wife Martín  ([place]) and dtr Leelee. Leelee is primary contact and confirms that she currently has a bed hold for the pt at [facility]) and plans to keep it for now. At this time, Leelee is in agreement to take each day at a time and that DICK will follow along. CM called and left a message with TCU and got a call back from Airam in admissions who confirms bed hold at this time.     BRADEN Min

## 2023-10-27 NOTE — TELEPHONE ENCOUNTER
Dr. Suarez is aware of message. See further documentation in patient outreach encounters on 10/26/23.    Bianca Coates RN

## 2023-10-27 NOTE — PROGRESS NOTES
[facility]    Progress Note - Hospitalist Service       Date of Admission:  10/25/2023    Assessment & Plan   Irene León is a 78 year old female admitted on 10/25/2023. She has a history of CHF with reduced ejection fraction (most recently EF 20-25% on 6/22), cardiomyopathy, severe COPD with chronic home oxygen 3 LPM at rest and 5-6 LPM with exertion, CAD, paroxysmal A-fib on Xarelto, CKD stage IV lung cancer on oral chemo and breast cancer on oral chemo who is admitted for acute hypoxic respiratory failure thought related to worsening metastatic disease, COPD.      Dyspnea   Acute on chronic hypoxic respiratory failure   COPD exacerbation  Patient developed acute hypoxia at [facility] TCU on day of admission.  At baseline patient requires around 3 L of oxygen but needed up to 7 L to achieve adequate saturation.  In the ED patient was found to be hypoxic, hypotensive to systolic 80s, tachypneic and required 15 L of oxygen per OxyMask.  Blood pressure recovered after 500 cc fluid bolus x2. Chest CT demonstrates severe emphysema, worsening metastatic disease compared to previous imaging. Although patient had WBC 15 and mildly peaked procal to 0.11, chest xray and CT not consistent with focal pneumonia/pneumonitis. There was diffuse wheezing on lung exam, thus hypoxia better explained by COPD exacerbation. Breathing has improved.   -Continue duonebs Q4 hours, patient will be discharged with duonebs  -Mucomyst as needed   -RT consulted; appreciate cares   -Supplemental oxygen, goal SPO2 88%-92%   -Continuous pulse oximetry   -Plan to discharge patient on Prednisone taper     History of congestive heart failure, EF 25%  BNP 1,845. Hypoxemia less likely due to CHF exacerbation given BNP significantly higher than 1800 on last admission (approx 6000), and chest xray, exam does not suggest she is fluid overloaded.   -Caution with IVF   -Cardiac tele   -Monitor oxygen saturation  as above      History of non-small cell lung cancer  History of Breast cancer   Bilateral Adrenal masses  Recent R sided adrenal hemorrhage  New mass of the back  Right-sided visual impairment  Daughter notes family recently found a mass on patient's back last week.  Patient complains of pain on palpation, or if she lays on it for too long.  Family is concerned about cancer metastasis.   Daughter noted she had noticed some new visual impairment in the patient 10/25-26.  Stating patient was struggling to see things clearly on her right side.  From exam, unlikely to be a stroke.  Discussed imaging options such as CT or MRI to look for possible metastases, the patient and family declined this option 10/26 but was in agreement today 10/27. Brain MRI showed large hemorrhagic metastic lesions in both occipital lobes, left>right, exhuberant vasogenic edema adjacent to both occipital lobe metastases with narrowing of the occipital horns bilaterally. Discussed result with patient and family. Patient wants to be discharged back to nursing home and continue PT, family is in agreement. They are declining hospice for now. Recommend follow up with oncology next week to discuss results and plan. Patient and family want a regular diet and we discussed risks of possible aspiration and they accepted the risk.  -Pulse oximetry   -Comfort focused         Diet: Regular Diet Adult    DVT Prophylaxis: Holding DOAC  Hayes Catheter: Not present  Fluids: P.o.  Lines: PRESENT      PICC 10/25/23 Triple Lumen Right Basilic-Site Assessment: WDL      Cardiac Monitoring: ACTIVE order. Indication: Tachyarrhythmias, acute (48 hours)  Code Status: No CPR- Do NOT Intubate      Clinically Significant Risk Factors           # Hypercalcemia: corrected calcium is >10.1, will monitor as appropriate    # Hypoalbuminemia: Lowest albumin = 2.9 g/dL at 10/26/2023  5:06 AM, will monitor as appropriate       # Hypertension: Noted on problem list  # Chronic heart  failure with reduced ejection fraction: last echo with EF <40%           # COPD: noted on problem list        Disposition Plan      Expected Discharge Date: 10/28/2023                The patient's care was discussed with the Attending Physician, Dr. Puri .    Kriss Franz MD PGY1  Hospitalist Service  [facility]  Securely message with [phone] (more info)  Text page via [facility] Paging/Directory   ______________________________________________________________________    Interval History   Patient was resting comfortable in bed with a lot of her family around. The patient was stating her breathing is better and she enjoys the duoneb. We discussed the plan of biopsy today and discussed possible imaging. She has no complaints of pain and denies current SOB, CP.    Physical Exam   Vital Signs: Temp: 97.6  F (36.4  C) Temp src: Oral BP: 124/71 Pulse: 83   Resp: 18 SpO2: 98 % O2 Device: Nasal cannula Oxygen Delivery: 5 LPM  Weight: 107 lbs 2.3 oz    Constitutional: awake, alert, cooperative, no apparent distress, and appears stated age  Eyes: Lids and lashes normal, pupils equal, round and reactive to light, extra ocular muscles intact, sclera clear, conjunctiva normal.  Respiratory: Tachypneic, but no increased work of breathing, bilateral diffuse expiratory wheezing, no crackles, good air movement  Cardiovascular: Regular rate and rhythm, normal S1 and S2, and no murmur noted  GI: Normal bowel sounds, soft, non-distended, non-tender, no masses palpated  Skin: Hard, movable growth approximately 5 cm on mid lateral left posterior back with ecchymosis  Neurologic: Awake, alert, oriented to name, place and time.  Motor is 3 out of 5 bilaterally.  Cerebellar finger to nose abnormal to the right side. Sensory is intact.  Visual field test abnormal on all quadrants on right side, patient stating she can see, but unable to accurately describe things in her right visual field.  No visible facial  drooping, symmetrical smile.  Neuropsychiatric: General: normal, calm, and inconsistent eye contact/searching  Level of consciousness: alert / normal  Affect: pleasant  Orientation: oriented to self, place, time and situation  Memory and insight: slightly impaired, memory for past and recent events intact, and thought process slightly impaired    Data     I have personally reviewed the following data over the past 24 hrs:    23.2 (H)  \   9.2 (L)   / 172     142 107 17.3 /  88   3.5 29 1.15 (H) \     ALT: <5 AST: 22 AP: 54 TBILI: 0.2   ALB: 3.0 (L) TOT PROTEIN: 6.4 LIPASE: N/A       Imaging results reviewed over the past 24 hrs:   Recent Results (from the past 24 hour(s))   MR Brain w/o & w Contrast   Result Value    Radiologist flags (AA)     Interval development of large hemorrhagic metastatic lesions in both occipital lobes.    Narrative    EXAM: MR BRAIN W/O and W CONTRAST  LOCATION: [facility]  DATE: 10/27/2023    INDICATION: Lung cancer, hemianopsia  COMPARISON: Brain MRI: 06/18/2023.  CONTRAST: 5 ml Gadavist given  TECHNIQUE: Routine multiplanar multisequence head MRI without and with intravenous contrast. High-resolution T1-weighted postcontrast images were also obtained.    FINDINGS:  INTRACRANIAL CONTENTS: Interval development of two large hemorrhagic lesions with intrinsic T1 hyperintense signal abnormality and associated susceptibility artifact in the bilateral occipital lobes. Both lesions demonstrate areas of patchy contrast   enhancement. The lesion on the left measures at least 36 x 32 x 44 mm (AP by TV by cc). The lesion on the right measures approximately 24 x 25 x 22 mm. Both lesions demonstrate exuberant adjacent vasogenic edema. Restricted diffusion associated with both   lesions is favored secondary to blood products. Partial effacement of the cerebral sulci overlying both lesions, left greater than right. There is local mass effect with both lesions partially  narrowing the occipital horns. No evidence of hydrocephalus.   No restricted diffusion to suggest acute infarct. No extra-axial fluid collections. Patchy and confluent nonspecific T2/FLAIR hyperintensities within the cerebral white matter most consistent with moderate chronic microvascular ischemic change. Similar   remote left PCA territory infarct. Moderate to advanced generalized cerebral atrophy. Normal position of the cerebellar tonsils. Basal cisterns are patent.    SELLA: No abnormality accounting for technique.    OSSEOUS STRUCTURES/SOFT TISSUES: Normal marrow signal. The major intracranial vascular flow voids are maintained.     ORBITS: No abnormality accounting for technique.     SINUSES/MASTOIDS: Scattered paranasal sinus mucosal thickening, greatest in the sphenoid sinuses. No middle ear or mastoid effusion.       Impression    IMPRESSION:    1.  Interval development of large hemorrhagic metastatic lesions in both occipital lobes as detailed above, left larger than right.  2.  Exuberant vasogenic edema adjacent to both occipital lobe metastases with narrowing of the occipital horns bilaterally. No evidence of hydrocephalus at this time.  3.  Additional chronic changes as described in the body of the report.    [Critical Result: Interval development of large hemorrhagic metastatic lesions in both occipital lobes.]    Finding was identified on 10/27/2023 12:08 PM CDT.     Dr. Franz was contacted by me on 10/27/2023 12:34 PM CDT and verbalized understanding of the critical result.    US Biopsy Back Soft Tissue Superfical    Narrative    EXAM:  1. PERCUTANEOUS BIOPSY BACK  2. ULTRASOUND GUIDANCE  LOCATION: [facility]  DATE: 10/27/2023    INDICATION: skin nodule on chest and back. for diagnosis and molecular test. Presumed metastatic cancer.    PROCEDURE: Informed consent obtained. Site marked. Prior images reviewed. Required items made available. Patient identity was confirmed  verbally and with arm band. Patient reevaluated immediately before beginning the procedure. Universal protocol was   followed. Time out performed. The site was prepped and draped in sterile fashion. 5 mL of 1 percent lidocaine was infused into the local soft tissues. Using standard technique and under direct ultrasound guidance, an 18 gauge biopsy needle was used to   make three core biopsies. Tissue was submitted to Pathology.     The patient tolerated the procedure well. No complications.      Impression    IMPRESSION:  Status post US-guided biopsy right mid back lesion.    Reference CPT Code: 70366, 10273

## 2023-10-27 NOTE — CONSULTS
Chronic Pulmonary Disease Specialist Education Consult  10/27/2023, 4:43 PM  Reason for Consult: COPD education  Patient Admitted for: COPD with acute exacerbation (H) [J44.1]  Acute on chronic respiratory failure with hypoxia (H) [J96.21]  Malignant neoplasm of lung, unspecified laterality, unspecified part of lung (H) [C34.90] on 10/25/2023     History of Present Illness: Irene León is a 78 year old woman with a history of stage 4 COPD, home oxygen dependent at 3 LPM at rest, CHF w/reduced EF (20-25%). CKD stage IV, stage IV lung cancer and breast cancer, getting oral chemo, heterozygous Factor 5, anxiety, and depression. She has experienced 6 admissions in the last 6 months. She presented in ED from TCU with increased shortness of breath and oxygen  needs with expiratory wheezes. She has been receiving treatment with steroids, antibiotics, Mucomist, and DuoNebs. Hematology & Oncology and Palliative were consulted and have seen her.     Last PFT:  Date: 10/4/2019  Post-Spirometry:  FVC: 2.23, 88%, +24%  FEV1: 1.14, 58%, +34%  FEV1/FVC: 51%    Home Respiratory Medications:  Bronchodilators:   -albuterol neb/inhaler PRN   -Atrovent inhaler PRN  Combination Therapy:   -Trelegy Ellipta daily  Other:   -Mucomist PRN    Imaging:  EXAM: CT CHEST W/O CONTRAST  LOCATION: [facility]  DATE: 10/25/2023  COMPARISON: 9/28/2023                                                                IMPRESSION:  1.  Worsening metastatic disease with increased size of multiple pulmonary nodules and thoracic lymph nodes compared to 9/28/2023.     2.  Numerous retroperitoneal/perinephric nodules in the visualized upper abdomen appear to be new compared to 9/29/2023 and also worrisome for metastatic disease. Bilateral adrenal nodules and right adrenal hemorrhage were better characterized on prior   contrast-enhanced CT.    Assessment: Patient currently is sitting up in bed eating. She states she is feeling much  better and appears in good spirits.     Education:  Stephanie is well known to our service. She has received education from us on previous admissions and participates in our follow up phone call program. I checked in with Stephanie to see if there was anything I could do for her or questions that I could answer. She had no questions for me. We reviewed her COPD action plan and spoke about her palliative consult. She is grateful for their help.  Recommendations:  -Continu current inpatient therapy, per [facility] protocols  -Continued outpatient follow up services with Palliative/Hospice  -Respiratory medications patient is currently taking at home appear to be appropriate.    Stephanie will participate in our call back program. Will continue to follow patient throughout hospital stay along with educating patient and family.    Total time spend with patient 25 minutes and 45 minutes spent in chart review, care coordination, and documentation.    Sharon Ryan RT, Chronic Pulmonary Disease Specialist  Phone [phone]

## 2023-10-27 NOTE — PLAN OF CARE
Pt A&Ox4, able to make needs known. 3-5L NC, SOB with exertion. Pt had MRI and biopsy performed this shift, MRI results discussed with family. Family at bedside, attentive to pt. Plan to discharge back to care center.    Problem: Pain Acute  Goal: Optimal Pain Control and Function  Outcome: Progressing  Intervention: Develop Pain Management Plan  Recent Flowsheet Documentation  Taken 10/27/2023 1300 by Hany Dahl RN  Pain Management Interventions: medication (see MAR)  Intervention: Prevent or Manage Pain  Recent Flowsheet Documentation  Taken 10/27/2023 1200 by Hany Dahl RN  Medication Review/Management: medications reviewed  Taken 10/27/2023 0800 by Hany Dahl RN  Medication Review/Management: medications reviewed  Intervention: Optimize Psychosocial Wellbeing  Recent Flowsheet Documentation  Taken 10/27/2023 1200 by Hany Dahl RN  Supportive Measures: active listening utilized  Taken 10/27/2023 0800 by Hany Dahl RN  Supportive Measures: active listening utilized

## 2023-10-27 NOTE — PROVIDER NOTIFICATION
10/27/23 0800   [facility] Assessment   Reason for Assessment COPD   Pulmonary Status 3   Surgical Status 0   Chest X-ray 0   Respiratory Pattern 2   Mental Status 0   Breath Sounds 2  (exp wheezes)   Cough Effectiveness 0   Level of Activity 0   O2 Required for SpO2>=92% 0  (on baseline 3L at rest, 5-6L with activity)   Acuity Level (points) 7   Acuity Level  4   Aerosol Therapy (SVN)   Daily Review of Necessity (SVN) completed   Respiratory Treatment Status (SVN) given   Patient Position HOB elevated   Posttreatment Assessment (SVN) breath sounds improved     Pt states she uses 3L at rest at home, 5-6L with activity, Takes inhalers BID and PRN at home. She states she does  not use nebs at home or hx of CHASTITY

## 2023-10-27 NOTE — PROGRESS NOTES
SPIRITUAL HEALTH SERVICES Progress Note  WW  [phone]    SHS visit due to consult order.  Pt was visiting with multiple family members and young children.   She appeared to be in good spirits.   Writer will cont to support thorough TANYA Guerrero M.Div., [facility]  Staff    [phone]

## 2023-10-27 NOTE — PROGRESS NOTES
Care Management Follow Up    Length of Stay (days): 2    Expected Discharge Date: 10/28/2023     Concerns to be Addressed:       Patient plan of care discussed at interdisciplinary rounds: Yes    Anticipated Discharge Disposition: Transitional Care     Anticipated Discharge Services: None  Anticipated Discharge DME: None    Patient/family educated on Medicare website which has current facility and service quality ratings: yes  Patient/Family in Agreement with the Plan: yes    Additional Information:  CM updated at 3:15 PM by resident team that the pt and family would like to return to the TCU today if possible and are requesting transport be set up for pt safety. CM called the TCU and reached voicemail. CM called 10 Mins later and spoke with Airam in admissions who says they currently have many admits still pending for today and have limited RN staffing and due to order medications requested an admit for tomorrow at 11:30 AM. CM called transport and was able to get a ride for 11:30AM -12:15  window for WC ride with 02 with [facility] transport. RN verified that WC would be okay for transport for the pt. CM and the provider met with pt, daughter Leelee, and the rest of the pt's family. CM stated that at this time the TCU is able to take back tomorrow between 0107-1298 with transport arranged as requested per family. Family is in agreement and is consenting for  transport for tomorrow as needed. CM called TCU and spoke with Airam in admissions and gave update about ride time for tomorrow. [facility] accepts admit time. CM updated CM who is covering for the weekend as needed.     BRADEN Min

## 2023-10-27 NOTE — PLAN OF CARE
Pt continues to have pain with the tumor on her back.  Medicated with acetaminophen and tramadol as ordered and repositioned with pillows.  Pt becomes very SOB  with any movement and requires periods of higher O2 to recover.  Continue to monitor.      Problem: Pain Acute  Goal: Optimal Pain Control and Function  Outcome: Not Progressing  Intervention: Develop Pain Management Plan  Intervention: Prevent or Manage Pain  Intervention: Optimize Psychosocial Wellbeing    Problem: Gas Exchange Impaired  Goal: Optimal Gas Exchange  Outcome: Not Progressing  Intervention: Optimize Oxygenation and Ventilation

## 2023-10-27 NOTE — PROGRESS NOTES
PALLIATIVE CARE PROGRESS NOTE  [facility]     Patient Name: Irene León  Date of Admission: 10/25/2023   Today the patient was seen for: goals of care and symptom management     Recommendations & Counseling     GOALS OF CARE:   Life-prolonging with limits  - DNR/I  Patient desires biopsy to determine if skin nodules are disease progression from lung or breast or benign in origin and MRI.  Patient expresses distress about dying and leaving her children after recent death of her  a few weeks ago.  Patient desires to continue with sotorasib and taking home medication-oral immunotherapy.  Recommend following up with outpatient palliative care upon discharge - established patient there with Dr. Miguel.     ADVANCE CARE PLANNING:  No health care directive on file. Per  informed consent policy, next of kin should be involved if patient becomes unable.  There is a POLST form on file, this was reviewed and current.  Code status: No CPR- Do NOT Intubate     MEDICAL MANAGEMENT:   #Dyspnea, CHF exacerbation and COPD  Oxygen  Repositioning  Management per hospital medicine service  Mucomyst, albuterol and DuoNeb nebulizers  Bumex 1 mg p.o. twice daily  Prednisone 40 mg p.o. daily     #Generalized weakness and fatigue secondary to CHF exacerbation, COPD, breast and lung cancer  Recommend PT and OT when more medically stable  At this time patient desires ongoing strengthening at TCU as has life-prolonging goals.     #history of Chronic pain 2/2 compression fracture  Continue tramadol 50 mg po every 3 times a day PRN, and tylenol PRN.       #Insomnia  Continues with zyprexa 5 mg at HS and melatonin 10 mg at HS PRN  Would use caution with adding any other medications that can prolong QTC. Would avoid benzodiazepine due to potential adverse effects.  If goals of care transition to comfort focused, then weigh risk vs benefit.     PSYCHOSOCIAL/SPIRITUAL:  Family daughter, Leelee, son, Yovanny,  "son-in-law, daughter-in-law and grandchildren  [place]: [place]   Would appreciate Spiritual Health Services    Palliative Care will sign off at this time as goals established. Thank you for the consult and allowing us to aid in the care of Irene León.    LEYLA Walker, APRN, CNS, AOCNS  Securely message with [employer] (more info)  Text page via [facility] Paging/Directory         Assessments           Irene León is a 78 year old female with a  PMH of nonischemic cardiomyopathy LVEF 20-25%, (on 6/22)  minimal coronary disease per angiogram, paroxysmal atrial fibrillation on Xarelto, lung cancer, COPD, chronic hypoxic respiratory failure on 3 L of home O2 at rest and 5-6L with exertion, chronic kidney disease who presented to the ED on 10/25/2023 with complaints of increased SOB and acute hypoxic respiratory failure. Patient recently hospitalized from 9/23/2023 - 10/5/2023 with acute hypoxic respiratory failure and CHF exacerbation and was discharged to TCU with hopes of strengthening.  Admisssion CT scan demonstrating \"Worsening metastatic disease with increased size of multiple pulmonary nodules and thoracic lymph nodes compared to 9/28/2023.  2.  Numerous retroperitoneal/perinephric nodules in the visualized upper abdomen appear to be new compared to 9/29/2023 and also worrisome for metastatic disease. Bilateral adrenal nodules and right adrenal hemorrhage were better characterized on prior   contrast-enhanced CT.\"      Prognosis, Goals, & Planning:   Prognosis, Goals, and/or Advance Care Planning addressed today:  Patient wishes to have biopsy of skin nodule and MRI today.  Then plans to discharge back to TCU and follow up Outpatient Palliative Care Clinic and with Dr. Suarez with Oncology.         Interval History:     Chart review/discussion with unit or clinical team members:   NAD. No changes.         Review of Systems:     ROS was reviewed.  Pain: right back pain 4/10 at rest in bed. " Controlled with tramadol  Dyspnea: mild with speaking  Nausea: denies  Weakness/fatigue: moderate  Anxiety: intermittent  Constipation: denies; no BM since admission               Physical Exam:   Temp:  [97.4  F (36.3  C)-98  F (36.7  C)] 97.5  F (36.4  C)  Pulse:  [] 73  Resp:  [14-35] 20  BP: (136-159)/(61-75) 154/75  FiO2 (%):  [32 %] 32 %  SpO2:  [91 %-100 %] 96 %  107 lbs 2.3 oz    General appearance: alert, appears stated age, cooperative, and fatigued  Head: atraumatic, temporal wasting noted  Eyes: sclera anicteric  Nose: no discharge  Throat: lips without lesions, oral mucosa moist  Lungs: clear to auscultation bilaterally  Neurologic: alert. Oriented x4           Current Problem List:   Principal Problem:    COPD with acute exacerbation (H)  Active Problems:    Acute on chronic respiratory failure with hypoxia (H)    Malignant neoplasm of lung, unspecified laterality, unspecified part of lung (H)      Allergies   Allergen Reactions    Sulfa (Sulfonamide Antibiotics) [Sulfa Antibiotics] Rash     Headaches and dizziness.    Homeopathic Drugs [External Allergen Needs Reconciliation - See Comment] Unknown     runny nose    Mold Extracts [Molds & Smuts] Unknown    Morphine (Pf) [Morphine] Unknown     hallucinate    Lisinopril Itching, Cough and Unknown     cough    Sulfacetamide Sodium [Sulfacetamide] Rash            Data Reviewed:     Results for orders placed or performed during the hospital encounter of 10/25/23 (from the past 24 hour(s))   CBC with platelets   Result Value Ref Range    WBC Count 23.2 (H) 4.0 - 11.0 10e3/uL    RBC Count 3.21 (L) 3.80 - 5.20 10e6/uL    Hemoglobin 9.2 (L) 11.7 - 15.7 g/dL    Hematocrit 29.8 (L) 35.0 - 47.0 %    MCV 93 78 - 100 fL    MCH 28.7 26.5 - 33.0 pg    MCHC 30.9 (L) 31.5 - 36.5 g/dL    RDW 14.6 10.0 - 15.0 %    Platelet Count 172 150 - 450 10e3/uL   Comprehensive metabolic panel   Result Value Ref Range    Sodium 142 135 - 145 mmol/L    Potassium 3.5 3.4 - 5.3  mmol/L    Carbon Dioxide (CO2) 29 22 - 29 mmol/L    Anion Gap 6 (L) 7 - 15 mmol/L    Urea Nitrogen 17.3 8.0 - 23.0 mg/dL    Creatinine 1.15 (H) 0.51 - 0.95 mg/dL    GFR Estimate 49 (L) >60 mL/min/1.73m2    Calcium 8.5 (L) 8.8 - 10.2 mg/dL    Chloride 107 98 - 107 mmol/L    Glucose 88 70 - 99 mg/dL    Alkaline Phosphatase 54 35 - 104 U/L    AST 22 0 - 45 U/L    ALT <5 0 - 50 U/L    Protein Total 6.4 6.4 - 8.3 g/dL    Albumin 3.0 (L) 3.5 - 5.2 g/dL    Bilirubin Total 0.2 <=1.2 mg/dL   MR Brain w/o & w Contrast   Result Value Ref Range    Radiologist flags (AA)      Interval development of large hemorrhagic metastatic lesions in both occipital lobes.    Narrative    EXAM: MR BRAIN W/O and W CONTRAST  LOCATION: [facility]  DATE: 10/27/2023    INDICATION: Lung cancer, hemianopsia  COMPARISON: Brain MRI: 06/18/2023.  CONTRAST: 5 ml Gadavist given  TECHNIQUE: Routine multiplanar multisequence head MRI without and with intravenous contrast. High-resolution T1-weighted postcontrast images were also obtained.    FINDINGS:  INTRACRANIAL CONTENTS: Interval development of two large hemorrhagic lesions with intrinsic T1 hyperintense signal abnormality and associated susceptibility artifact in the bilateral occipital lobes. Both lesions demonstrate areas of patchy contrast   enhancement. The lesion on the left measures at least 36 x 32 x 44 mm (AP by TV by cc). The lesion on the right measures approximately 24 x 25 x 22 mm. Both lesions demonstrate exuberant adjacent vasogenic edema. Restricted diffusion associated with both   lesions is favored secondary to blood products. Partial effacement of the cerebral sulci overlying both lesions, left greater than right. There is local mass effect with both lesions partially narrowing the occipital horns. No evidence of hydrocephalus.   No restricted diffusion to suggest acute infarct. No extra-axial fluid collections. Patchy and confluent nonspecific T2/FLAIR  hyperintensities within the cerebral white matter most consistent with moderate chronic microvascular ischemic change. Similar   remote left PCA territory infarct. Moderate to advanced generalized cerebral atrophy. Normal position of the cerebellar tonsils. Basal cisterns are patent.    SELLA: No abnormality accounting for technique.    OSSEOUS STRUCTURES/SOFT TISSUES: Normal marrow signal. The major intracranial vascular flow voids are maintained.     ORBITS: No abnormality accounting for technique.     SINUSES/MASTOIDS: Scattered paranasal sinus mucosal thickening, greatest in the sphenoid sinuses. No middle ear or mastoid effusion.       Impression    IMPRESSION:    1.  Interval development of large hemorrhagic metastatic lesions in both occipital lobes as detailed above, left larger than right.  2.  Exuberant vasogenic edema adjacent to both occipital lobe metastases with narrowing of the occipital horns bilaterally. No evidence of hydrocephalus at this time.  3.  Additional chronic changes as described in the body of the report.    [Critical Result: Interval development of large hemorrhagic metastatic lesions in both occipital lobes.]    Finding was identified on 10/27/2023 12:08 PM CDT.     Dr. Franz was contacted by me on 10/27/2023 12:34 PM CDT and verbalized understanding of the critical result.      *Note: Due to a large number of results and/or encounters for the requested time period, some results have not been displayed. A complete set of results can be found in Results Review.      38 MINUTES SPENT BY ME on the date of service doing chart review, history, exam, documentation & further activities per the note.

## 2023-10-28 NOTE — DISCHARGE SUMMARY
[facility]  Discharge Summary - Medicine & Pediatrics       Date of Admission:  10/25/2023  Date of Discharge:  10/28/2023  Discharging Provider: Sebas Puri MD  Discharge Service: Hospitalist Service    Discharge Diagnoses   Acute on chronic hypoxic respiratory failure  COPD exacerbation  NSCLC with metastatic disease progressing  New left and right occipital lobe masses, likely brain metastatic disease  Anxiety     Clinically Significant Risk Factors          Follow-ups Needed After Discharge   Follow-up Appointments     Follow Up and recommended labs and tests      Follow up with Dr Suarez and Palliative care as planned.  Follow up with Facility Physician or Dr Suarez regarding resumption of   Bumex, continue to hold for now.            Unresulted Labs Ordered in the Past 30 Days of this Admission       Date and Time Order Name Status Description    10/27/2023 12:23 PM Surgical Pathology Exam In process     10/25/2023  5:50 PM Blood Culture Peripheral Blood Preliminary     10/25/2023  5:50 PM Blood Culture Peripheral Blood Preliminary         These results will be followed up by [facility] and forwarded to PCP/Oncologist.     Discharge Disposition   Discharged to short-term care facility  Condition at discharge: Poor    Hospital Course   Irene León was admitted on 10/25/2023 for acute on chronic hypoxic respiratory failure likely 2/2 COPD exacerbation.  The following problems were addressed during her hospitalization:    Acute on chronic hypoxic respiratory failure  COPD Exacerbation  Patient is oxygen dependent at 2-3 L at baseline.  She presented to the ED hypoxic to 80%, she required about 8 L.  Following DuoNebs, IV steroids she improved significantly overnight to her baseline.  Her respiratory status remained stable throughout the remainder of the hospitalization.  She was discharged back to her facility with a prolonged steroid taper as well as continuation of her PTA inhalers and  nebulizer treatments.  - Prednisone taper 40 mg, 30 mg, 20 mg, 10 mg x 3 days each     NSCLC with metastatic disease progressing  New left and right occipital lobe masses, likely brain metastatic disease  Right visual field deficit, New  Back Mass  Has been receiving palliative treatment with [facility]. Presented with SOB and worsening hypoxia. CT imaging revealed progression of her known pulmonary malignancy in the setting of severe oxygen dependent COPD. CT Chest on admission showed worsening pulmonary disease. She was noted to have a right visual field deficit. The family and patient initially declined brain MRI while discussing goals of care. The did ultimately proceed with brain MRI which showed new Interval development of large hemorrhagic metastatic lesions in both occipital lobes as detailed above, left larger than right. Exuberant vasogenic edema adjacent to both occipital lobe metastases with narrowing of the occipital horns bilaterally. No evidence of hydrocephalus at this time.  Findings were discussed briefly with Neurosurgery who did not recommend additional steroids or antiepileptics given the lack of seizure activity and overall prognosis. See goals of care below.  - Oral chemotherapy Lumakras was continued on discharge  - Follow up with Dr Suarez as planned  - Follow up with Palliative care as planned   - She underwent biopsy of back mass per her request, pathology pending at discharge     Atrial Fibrillation  Previously on Rivaraxaban. New brain metastatic disease masses described as hemorrhagic.  - Rivaraxaban stopped in setting of concern for brain hemorrhage    Anxiety  Has known significant anxiety. Hydroxyzine and Lorazepam effective during hospitalization to manage symptoms.  -Hydroxyzine 25-50 mg Q6h PRN   -Lorazepam 0.5 mg Q6h PRN if hydroxyzine ineffective     Goals of Care  Patient met with primary oncologist as well as palliative care during admission.  Her prognosis was reviewed  including her multiple indications that would allow her to sign onto hospice, including her congestive heart failure with ejection fraction of 20%, her end-stage COPD requiring oxygen, and her progressing metastatic cancer now with new brain metastases.  After discussion with her primary oncologist and palliative care, the patient continued to opt for restorative goals including physical therapy.  She desired to return to her TCU with ongoing physical therapy.  Her family was supportive of her wishes but also repeatedly offered to Stephanie that they would care for her if she opted for hospice at this time.  She was discharged to TCU with resumption of cares, close follow-up with palliative and her primary oncologist.      Consultations This Hospital Stay   VASCULAR ACCESS ADULT IP CONSULT  IP RESPIRATORY CARE CHRONIC PULMONARY DISEASE SPECIALIST  RESPIRATORY CARE IP CONSULT  SPIRITUAL HEALTH SERVICES IP CONSULT  PALLIATIVE CARE ADULT IP CONSULT  HEMATOLOGY & ONCOLOGY IP CONSULT  CARE MANAGEMENT / SOCIAL WORK IP CONSULT  IP RESPIRATORY CARE CHRONIC PULMONARY DISEASE SPECIALIST    Code Status   No CPR- Do NOT Intubate       The patient was discussed with Dr. Puri.     Irene Lombardo MD  [facility]  [facility] [address] [address]  Phone: [phone]  Fax: [phone]  ______________________________________________________________________    Physical Exam   Vital Signs: Temp: 98  F (36.7  C) Temp src: Oral BP: (!) 145/67 Pulse: 86   Resp: 26 SpO2: 90 % O2 Device: Nasal cannula Oxygen Delivery: 4 LPM  Weight: 104 lbs 11.5 oz  Constitutional: awake, alert, cooperative, no apparent distress, and appears stated age  HEENT: Right visual field deficit. EOM intact.   Respiratory: Regular respiratory rate, no increased work of breathing, bilateral scattered expiratory wheezing, no crackles, good air movement  Cardiovascular: Regular rate and rhythm,  normal S1 and S2, and no murmur noted  GI: Normal bowel sounds, soft, non-distended, non-tender, no masses palpated  Skin: Hard, movable growth approximately 5 cm on mid lateral left posterior back with ecchymosis  Neurologic: Awake, alert, oriented to name, place and time.  Motor is 3 out of 5 bilaterally.  Cerebellar finger to nose abnormal to the right side. Sensory is intact.  Visual field test abnormal on all quadrants on right side, patient stating she can see, but unable to accurately describe things in her right visual field.  No visible facial drooping, symmetrical smile.  Neuropsychiatric: General: normal, calm      Primary Care Physician   Pankaj Montanez    Discharge Orders      Primary Care - Care Coordination Referral      General info for SNF    Length of Stay Estimate: Short Term Care: Estimated # of Days <30  Condition at Discharge: Stable  Level of care:skilled   Rehabilitation Potential: Fair  Admission H&P remains valid and up-to-date: Yes  Recent Chemotherapy: Date:                     Oral chemotherapy ongoing  Use Nursing Home Standing Orders: Yes     Mantoux instructions    Give two-step Mantoux (PPD) Per Facility Policy Yes     Follow Up and recommended labs and tests    Follow up with Dr Suarez and Palliative care as planned.  Follow up with Facility Physician or Dr Suarez regarding resumption of Bumex, continue to hold for now.     Reason for your hospital stay    COPD exacerbation, also found to have new metastatic disease.     Activity - Up ad ford     Patient care order    Resume PT/OT orders as previously were active.     Patient care order    Please notify MD if patient has increased shortness of breath or weight gain of greater than 3 lbs. Consider resuming Bumex.     No CPR- Do NOT Intubate     Fall precautions     Diet    Follow this diet upon discharge: Orders Placed This Encounter      Regular Diet Adult       Significant Results and Procedures   Results for orders placed or performed  during the hospital encounter of 10/25/23   XR Chest Port 1 View    Narrative    EXAM: XR CHEST PORT 1 VIEW  LOCATION: [facility]  DATE: 10/25/2023    INDICATION: Dyspnea, respiratory failure  COMPARISON: Chest x-ray 09/28/2023 and CT chest 09/28/2023      Impression    IMPRESSION: Heart size within normal limits. Enlarged right hilum representing adenopathy as seen on CT. Pulmonary vascularity normal. Mid right lung nodule, better appreciated on CT. Subsegmental atelectasis or scarring in the bases. No acute   infiltrates or effusions.   Chest CT w/o contrast    Narrative    EXAM: CT CHEST W/O CONTRAST  LOCATION: [facility]  DATE: 10/25/2023    INDICATION: Dyspnea, productive cough  COMPARISON: 9/28/2023  TECHNIQUE: CT chest without IV contrast. Multiplanar reformats were obtained. Dose reduction techniques were used.  CONTRAST: None.    FINDINGS:    LUNGS AND PLEURA: Slightly decreased size of the peripheral right upper lobe pulmonary nodule measuring 1.7 x 1.4 cm (5/76), previously 1.9 x 1.9 cm, prior to that 2.3 x 2.1 cm. However, there is again increased size of many other satellite nodules   bilaterally. For example:  -Centrally in the right upper lobe, 2.1 x 2.0 cm (5/60), previously 0.7 x 0.5 cm.  -Left lower lobe subpleural nodule, 1.0 x 0.9 cm (5/71), previously 0.7 x 0.7 cm.    Advanced emphysema with areas of atelectasis and scarring. Similar pleural thickening and architectural distortion in the anterior lingula. Most of the endobronchial debris has resolved compared to prior. No pleural effusion or pneumothorax.    MEDIASTINUM/AXILLAE: Increased size of mediastinal and right hilar lymph nodes compared to 9/20/2023. For example, a pretracheal lymph node now measures 4.2 x 4.1 cm (4/56), previously 2.1 x 1.5 cm.    CORONARY ARTERY CALCIFICATION: Mild.    UPPER ABDOMEN: Bilateral adrenal masses and right adrenal hemorrhage, better demonstrated on CT  9/29/2023. Small perinephric nodules are new compared to 9/29/2023.    MUSCULOSKELETAL: No aggressive or destructive lesions. Unchanged T12 compression deformity.      Impression    IMPRESSION:  1.  Worsening metastatic disease with increased size of multiple pulmonary nodules and thoracic lymph nodes compared to 9/28/2023.    2.  Numerous retroperitoneal/perinephric nodules in the visualized upper abdomen appear to be new compared to 9/29/2023 and also worrisome for metastatic disease. Bilateral adrenal nodules and right adrenal hemorrhage were better characterized on prior   contrast-enhanced CT.     XR Chest Port 1 View    Narrative    EXAM: XR CHEST PORT 1 VIEW  LOCATION: [facility]  DATE: 10/25/2023    INDICATION: RN placed PICC   verify tip placement  COMPARISON: CT 10/25/2023, 1804 hours      Impression    IMPRESSION: Right upper extremity PICC tip in the mid SVC. Grossly unchanged emphysema, pulmonary nodules, right hilar adenopathy.   US Biopsy Back Soft Tissue Superfical    Narrative    EXAM:  1. PERCUTANEOUS BIOPSY BACK  2. ULTRASOUND GUIDANCE  LOCATION: [facility]  DATE: 10/27/2023    INDICATION: skin nodule on chest and back. for diagnosis and molecular test. Presumed metastatic cancer.    PROCEDURE: Informed consent obtained. Site marked. Prior images reviewed. Required items made available. Patient identity was confirmed verbally and with arm band. Patient reevaluated immediately before beginning the procedure. Universal protocol was   followed. Time out performed. The site was prepped and draped in sterile fashion. 5 mL of 1 percent lidocaine was infused into the local soft tissues. Using standard technique and under direct ultrasound guidance, an 18 gauge biopsy needle was used to   make three core biopsies. Tissue was submitted to Pathology.     The patient tolerated the procedure well. No complications.      Impression    IMPRESSION:  Status post  US-guided biopsy right mid back lesion.    Reference CPT Code: 34043, 31995   MR Brain w/o & w Contrast     Value    Radiologist flags (AA)     Interval development of large hemorrhagic metastatic lesions in both occipital lobes.    Narrative    EXAM: MR BRAIN W/O and W CONTRAST  LOCATION: [facility]  DATE: 10/27/2023    INDICATION: Lung cancer, hemianopsia  COMPARISON: Brain MRI: 06/18/2023.  CONTRAST: 5 ml Gadavist given  TECHNIQUE: Routine multiplanar multisequence head MRI without and with intravenous contrast. High-resolution T1-weighted postcontrast images were also obtained.    FINDINGS:  INTRACRANIAL CONTENTS: Interval development of two large hemorrhagic lesions with intrinsic T1 hyperintense signal abnormality and associated susceptibility artifact in the bilateral occipital lobes. Both lesions demonstrate areas of patchy contrast   enhancement. The lesion on the left measures at least 36 x 32 x 44 mm (AP by TV by cc). The lesion on the right measures approximately 24 x 25 x 22 mm. Both lesions demonstrate exuberant adjacent vasogenic edema. Restricted diffusion associated with both   lesions is favored secondary to blood products. Partial effacement of the cerebral sulci overlying both lesions, left greater than right. There is local mass effect with both lesions partially narrowing the occipital horns. No evidence of hydrocephalus.   No restricted diffusion to suggest acute infarct. No extra-axial fluid collections. Patchy and confluent nonspecific T2/FLAIR hyperintensities within the cerebral white matter most consistent with moderate chronic microvascular ischemic change. Similar   remote left PCA territory infarct. Moderate to advanced generalized cerebral atrophy. Normal position of the cerebellar tonsils. Basal cisterns are patent.    SELLA: No abnormality accounting for technique.    OSSEOUS STRUCTURES/SOFT TISSUES: Normal marrow signal. The major intracranial vascular  flow voids are maintained.     ORBITS: No abnormality accounting for technique.     SINUSES/MASTOIDS: Scattered paranasal sinus mucosal thickening, greatest in the sphenoid sinuses. No middle ear or mastoid effusion.       Impression    IMPRESSION:    1.  Interval development of large hemorrhagic metastatic lesions in both occipital lobes as detailed above, left larger than right.  2.  Exuberant vasogenic edema adjacent to both occipital lobe metastases with narrowing of the occipital horns bilaterally. No evidence of hydrocephalus at this time.  3.  Additional chronic changes as described in the body of the report.    [Critical Result: Interval development of large hemorrhagic metastatic lesions in both occipital lobes.]    Finding was identified on 10/27/2023 12:08 PM CDT.     Dr. Franz was contacted by me on 10/27/2023 12:34 PM CDT and verbalized understanding of the critical result.      *Note: Due to a large number of results and/or encounters for the requested time period, some results have not been displayed. A complete set of results can be found in Results Review.       Discharge Medications      Review of your medicines        START taking        Dose / Directions   hydrOXYzine 25 MG tablet  Commonly known as: ATARAX  Used for: Anxiety      Dose: 25-50 mg  Take 1-2 tablets (25-50 mg) by mouth every 6 hours as needed for anxiety (adjuvant pain)  Refills: 0     LORazepam 0.5 MG tablet  Commonly known as: ATIVAN  Used for: Anxiety      Dose: 0.5 mg  Take 1 tablet (0.5 mg) by mouth every 6 hours as needed for anxiety Give Hydroxyzine 25-50 mg prior to Lorazepam. If Hydroxyzine not effective for anxiety, give Lorazepam.  Quantity: 12 tablet  Refills: 0     predniSONE 20 MG tablet  Commonly known as: DELTASONE  Used for: COPD exacerbation (H)      Start taking on: October 29, 2023  Take 2 tablets (40 mg) by mouth daily for 3 days, THEN 1.5 tablets (30 mg) daily for 3 days, THEN 1 tablet (20 mg) daily for 3  days, THEN 0.5 tablets (10 mg) daily for 3 days.  Refills: 0            CONTINUE these medicines which may have CHANGED, or have new prescriptions. If we are uncertain of the size of tablets/capsules you have at home, strength may be listed as something that might have changed.        Dose / Directions   zinc oxide 40 % external ointment  Commonly known as: DESITIN  This may have changed: when to take this  Used for: Intertrigo      Apply topically as needed for dry skin or irritation (Twice a day to gluteal area skin irritation)  Quantity: 113 g  Refills: 1            CONTINUE these medicines which have NOT CHANGED        Dose / Directions   acetaminophen 500 MG tablet  Commonly known as: TYLENOL      Dose: 1,000 mg  Take 1,000 mg by mouth every 8 hours as needed for mild pain or fever  Refills: 0     acetylcysteine 10 % nebulizer solution  Commonly known as: MUCOMYST      Dose: 4 mL  Inhale 4 mLs into the lungs every 4 hours as needed for mucolysis/respiratory distress  Refills: 0     albuterol 108 (90 Base) MCG/ACT inhaler  Commonly known as: PROAIR HFA/PROVENTIL HFA/VENTOLIN HFA      Dose: 2 puff  Inhale 2 puffs into the lungs every 6 hours as needed for shortness of breath, wheezing or cough  Refills: 0     Azelastine HCl 137 MCG/SPRAY Soln      Dose: 1 spray  Spray 1 spray into both nostrils 2 times daily as needed for rhinitis  Refills: 0     benzonatate 100 MG capsule  Commonly known as: Tessalon Perles  Used for: Pulmonary emphysema, unspecified emphysema type (H)      Dose: 100 mg  Take 1 capsule (100 mg) by mouth 3 times daily as needed for cough  Quantity: 90 capsule  Refills: 3     cetirizine 5 MG tablet  Commonly known as: zyrTEC  Used for: Seasonal allergic rhinitis, unspecified trigger      Dose: 5 mg  Take 1 tablet (5 mg) by mouth daily as needed for allergies  Refills: 0     chlorhexidine 0.12 % solution  Commonly known as: PERIDEX  Indication: Gum Inflammation      Dose: 15 mL  Take 15 mLs by  mouth nightly as needed  Refills: 0     escitalopram 10 MG tablet  Commonly known as: LEXAPRO  Used for: Anxiety      Dose: 10 mg  Take 1 tablet (10 mg) by mouth daily  Quantity: 90 tablet  Refills: 3     famotidine 20 MG tablet  Commonly known as: PEPCID  Used for: COPD exacerbation (H), Pulmonary emphysema, unspecified emphysema type (H)      Dose: 20 mg  Take 1 tablet (20 mg) by mouth every other day  Quantity: 90 tablet  Refills: 0     fluticasone 50 MCG/ACT nasal spray  Commonly known as: FLONASE      Dose: 1 spray  Spray 1 spray into both nostrils daily as needed for rhinitis or allergies  Refills: 0     Fluticasone-Umeclidin-Vilanterol 200-62.5-25 MCG/INH oral inhaler  Commonly known as: TRELEGY ELLIPTA      Dose: 1 puff  Inhale 1 puff into the lungs At Bedtime  Refills: 0     ipratropium - albuterol 0.5 mg/2.5 mg/3 mL 0.5-2.5 (3) MG/3ML neb solution  Commonly known as: DUONEB      Dose: 1 vial  Take 1 vial by nebulization every 6 hours as needed for shortness of breath, wheezing or cough  Refills: 0     ipratropium 17 MCG/ACT inhaler  Commonly known as: ATROVENT HFA      Dose: 2 puff  Inhale 2 puffs into the lungs every 6 hours as needed for wheezing  Refills: 0     Melatonin 5 MG Tbdp      Dose: 15 mg  Take 15 mg by mouth At Bedtime  Refills: 0     metoprolol succinate ER 50 MG 24 hr tablet  Commonly known as: TOPROL XL  Used for: Heart failure, systolic, acute on chronic (H)      Dose: 50 mg  Take 1 tablet (50 mg) by mouth At Bedtime  Quantity: 30 tablet  Refills: 0     mirabegron 25 MG 24 hr tablet  Commonly known as: MYRBETRIQ  Used for: Urinary frequency      Dose: 25 mg  Take 1 tablet (25 mg) by mouth daily  Quantity: 90 tablet  Refills: 2     nystatin 257976 UNIT/GM external cream  Commonly known as: MYCOSTATIN      Apply topically 3 times daily as needed for dry skin  Refills: 0     OLANZapine 5 MG tablet  Commonly known as: zyPREXA  Used for: Insomnia due to medical condition      Dose: 5 mg  Take 1  tablet (5 mg) by mouth At Bedtime  Quantity: 30 tablet  Refills: 3     prochlorperazine 10 MG tablet  Commonly known as: COMPAZINE  Used for: Malignant neoplasm of upper lobe of right lung (H)      Dose: 10 mg  Take 1 tablet (10 mg) by mouth every 6 hours as needed (Nausea/Vomiting)  Quantity: 30 tablet  Refills: 5     simvastatin 40 MG tablet  Commonly known as: ZOCOR  Used for: Hypercholesterolemia      Dose: 40 mg  Take 1 tablet (40 mg) by mouth At Bedtime  Quantity: 90 tablet  Refills: 3     sodium chloride 0.9 % neb solution      Dose: 3 mL  Take 3 mLs by nebulization every 3 hours as needed for wheezing  Refills: 0     sotorasib 120 MG tablet  Commonly known as: LUMAKRAS  Used for: Malignant neoplasm of upper lobe of right lung (H)      Dose: 960 mg  Take 8 tablets (960 mg) by mouth daily for 30 days Do not chew, crush or split tablets.  Quantity: 240 tablet  Refills: 0     spironolactone 25 MG tablet  Commonly known as: ALDACTONE  Used for: Heart failure with reduced ejection fraction (H)      Dose: 25 mg  Take 1 tablet (25 mg) by mouth daily  Quantity: 30 tablet  Refills: 0     tamoxifen 20 MG tablet  Commonly known as: NOLVADEX  Used for: Malignant neoplasm of central portion of left breast in female, estrogen receptor positive (H)      Dose: 20 mg  Take 1 tablet (20 mg) by mouth daily  Quantity: 90 tablet  Refills: 3     traMADol 50 MG tablet  Commonly known as: ULTRAM      Dose: 50 mg  Take 50 mg by mouth 3 times daily as needed for severe pain  Refills: 0            STOP taking      bumetanide 1 MG tablet  Commonly known as: BUMEX        rivaroxaban ANTICOAGULANT 15 MG Tabs tablet  Commonly known as: XARELTO                  Where to get your medicines        Some of these will need a paper prescription and others can be bought over the counter. Ask your nurse if you have questions.    Bring a paper prescription for each of these medications  LORazepam 0.5 MG tablet           Allergies   Allergies    Allergen Reactions    Sulfa (Sulfonamide Antibiotics) [Sulfa Antibiotics] Rash     Headaches and dizziness.    Homeopathic Drugs [External Allergen Needs Reconciliation - See Comment] Unknown     runny nose    Mold Extracts [Molds & Smuts] Unknown    Morphine (Pf) [Morphine] Unknown     hallucinate    Lisinopril Itching, Cough and Unknown     cough    Sulfacetamide Sodium [Sulfacetamide] Rash

## 2023-10-28 NOTE — PLAN OF CARE
[id number]: A/ox4. VSS on 3L NC (baseline). Denied pain on shift. Intermittently anxious- prn atarax ordered, given x2 effective. Slept in between cares. Up with assist of 1. Pt makes needs known. No acute events on shift.       Problem: Adult Inpatient Plan of Care  Goal: Optimal Comfort and Wellbeing  Outcome: Progressing     Problem: Pain Acute  Goal: Optimal Pain Control and Function  Outcome: Progressing     Problem: Gas Exchange Impaired  Goal: Optimal Gas Exchange  Outcome: Progressing

## 2023-10-28 NOTE — PROGRESS NOTES
Patient received neb treatment per MD order. Pt educated with the explanation of the medication they received. Patient showed understanding with verbalization.  BS: coarse and diminished.  No change post treatment.  Pt on 6L NC.     Peri Garcia, RT on 10/27/2023 at 8:00 PM

## 2023-10-28 NOTE — PROGRESS NOTES
Care Management Discharge Note    Discharge Date: 10/28/2023       Discharge Disposition: Transitional Care    Discharge Services: Transportation Services    Discharge DME: None    Discharge Transportation: agency    Private pay costs discussed: Not applicable    Does the patient's insurance plan have a 3 day qualifying hospital stay waiver?  No    PAS Confirmation Code:  Not needed d/t admitted from TCU; returning to TCU  Patient/family educated on Medicare website which has current facility and service quality ratings: yes    Education Provided on the Discharge Plan: Yes    Persons Notified of Discharge Plans: patient, daughter Leelee    Patient/Family in Agreement with the Plan: yes    Additional Information:    Pt discharging to TCU today.      [facility] wheelchair with O2 secured for today 10/28, to [facility] TCU, pickup window 1130a-1215p.    Spoke to daughter Leelee, who is aware of and agrees to these discharge plans.    PAS not needed d/t pt admitted to hospital from TCU and returning to TCU.    [facility] - Spoke to [place] who confirms they can accept pt today at 1130a-1215p planned.    Discharge orders to be faxed in via [facility] to fax#[phone].  [facility] is unable to receive orders via Epic today.    CC referral sent per protocol.     Umberto Morton RN

## 2023-10-30 NOTE — PROGRESS NOTES
[facility] GERIATRIC SERVICES       Patient Irene León  MRN: [id number]        Reason for Visit     Chief Complaint   Patient presents with    Hospital F/U     RE-ADMIT       Code Status     DNR / DNI    Assessment     COPD exacerbation  Acute on chronic hypoxic respiratory failure  HFrEF  Breast cancer  Lung cancer on oral chemotherapy metastatic with progression noted  New left and right occipital lobe masses likely brain metastatic disease  CKS  Factor V Leiden  Hyperlipidemia  Hypertension  Depression  Anxiety  CKD  GERD    Plan     Pt is admitted to TCU for strengthening and rehab.  Patient was admitted with hypoxic respiratory failure with severe COPD.   Unfortunately was readmitted with worsening symptoms and disease progression.  Seen by her primary oncologist and recommended palliative treatment  Now is scheduled for a CT chest abdomen and pelvis with contrast tomorrow.  Neurosurgery was involved in the care because of presence of vasogenic edema with brain metastases  No steroids or antiepileptics were recommended by them  Back biopsy results are back and are consistent with poorly differentiated carcinoma most likely lung cancer  In light of her advancing disease she has been taken off her rivaroxaban due to hemorrhagic nature of her upper intracranial mets.  Anxiety remains her significant concern and she is on Ativan as well as hydroxyzine.  Reports that is limiting her ability to progress in therapy  She is adamant that she wants to go home and does not want to stay in long-term care.  She will have enough family support  I have reviewed her overall poor prognosis and requested hospice this was reviewed with her daughter.  Get encouraging patient and her daughter to have a discussion with her primary oncologist before making a final decision    History     Patient is a very pleasant 78 year old female who is readmitted to TCU    Hospitalized for acute hypoxic respiratory failure secondary to COPD  and CHF. Noted to have aspiration pneumonitis. Improved with ceftriaxone, doxycyline, and prednisone. Discharged with Augmentin which patient has finished. Cardiology changed lasix to Bumex.   She was in the TCU when she noticed a lump in her back concerning for metastatic spread.  Oncology was consulted and she has had a biopsy.  She was readmitted to the hospital with respiratory distress and noted to have metastatic spread of her cancer  Now has vision impairment and bilateral intracranial mets  Discharged back to the TCU as she wants to do some degree of rehabilitation before discharging home and is adamant she will not go to a long-term care    Past Medical & Surgical History     PAST MEDICAL HISTORY:   Past Medical History:   Diagnosis Date    ANATOLIY (acute kidney injury) (H24)     Allergic rhinitis     Arrhythmia     Atrial fibrillation with RVR (H)     Bacteremia     Breast cancer (H) 2017    Cardiomyopathy (H)     Centrilobular emphysema (H)     CHF (congestive heart failure) (H)     Chronic kidney disease     CKD (chronic kidney disease)     COPD (chronic obstructive pulmonary disease) (H)     Coronary artery disease     Depression     Dysphagia     E. coli sepsis (H)     Factor 5 Leiden mutation, heterozygous (H24)     GERD (gastroesophageal reflux disease)     Heart failure with reduced ejection fraction (H) 04/17/2023    Hx of radiation therapy 2017    Hyperlipidemia     Hypertension     Hypokalemia     Hypomagnesemia     Idiopathic cardiomyopathy (H)     Left hip pain 04/30/2014    OAB (overactive bladder)     Osteoporosis     Sacral insufficiency fracture     Sinusitis     Syncope     Urge incontinence     Vocal cord dysfunction       PAST SURGICAL HISTORY:   has a past surgical history that includes IR Abdominal Aortogram (4/16/2003); IR Miscellaneous Procedure (4/16/2003); IR Aortic Arch 4 Vessel Angiogram (4/16/2003); Arthroplasty revision hip (Left); OPEN TX FEMORAL FRACTURE DISTAL MED/LAT CONDYLE  (Left, 10/28/2015); Cardiac catheterization; Cataract Extraction (Left, 07/18/2017); Biopsy breast (Right, 2017); Bladder surgery; Lumpectomy breast (Left, 06/2017); PICC/Midline Placement (4/7/2022); PICC/Midline Placement (4/11/2022); and PICC/Midline Placement (10/25/2023).      Past Social History     Reviewed,  reports that she quit smoking about 16 years ago. Her smoking use included cigarettes. She has been exposed to tobacco smoke. She quit smokeless tobacco use about 19 years ago. She reports that she does not drink alcohol and does not use drugs.    Family History     Reviewed, and family history includes Breast Cancer in her maternal aunt; Osteoporosis in an other family member; Prostate Cancer in her brother and maternal uncle.    Medication List     Current Outpatient Medications   Medication    acetaminophen (TYLENOL) 500 MG tablet    acetylcysteine (MUCOMYST) 10 % nebulizer solution    albuterol (PROAIR HFA/PROVENTIL HFA/VENTOLIN HFA) 108 (90 Base) MCG/ACT inhaler    Azelastine HCl 137 MCG/SPRAY SOLN    benzonatate (TESSALON PERLES) 100 MG capsule    cetirizine (ZYRTEC) 5 MG tablet    chlorhexidine (PERIDEX) 0.12 % solution    escitalopram (LEXAPRO) 10 MG tablet    famotidine (PEPCID) 20 MG tablet    fluticasone (FLONASE) 50 MCG/ACT nasal spray    Fluticasone-Umeclidin-Vilanterol (TRELEGY ELLIPTA) 200-62.5-25 MCG/INH oral inhaler    hydrOXYzine (ATARAX) 25 MG tablet    ipratropium (ATROVENT HFA) 17 MCG/ACT inhaler    ipratropium - albuterol 0.5 mg/2.5 mg/3 mL (DUONEB) 0.5-2.5 (3) MG/3ML neb solution    LORazepam (ATIVAN) 0.5 MG tablet    Melatonin 5 MG TBDP    metoprolol succinate ER (TOPROL XL) 50 MG 24 hr tablet    mirabegron (MYRBETRIQ) 25 MG 24 hr tablet    nystatin (MYCOSTATIN) 708572 UNIT/GM external cream    OLANZapine (ZYPREXA) 5 MG tablet    predniSONE (DELTASONE) 20 MG tablet    prochlorperazine (COMPAZINE) 10 MG tablet    simvastatin (ZOCOR) 40 MG tablet    sodium chloride 0.9 % neb  solution    sotorasib (LUMAKRAS) 120 MG tablet    spironolactone (ALDACTONE) 25 MG tablet    tamoxifen (NOLVADEX) 20 MG tablet    traMADol (ULTRAM) 50 MG tablet    zinc oxide (DESITIN) 40 % external ointment     No current facility-administered medications for this visit.      MED REC REQUIRED  Post Medication Reconciliation Status: discharge medications reconciled and changed, per note/orders       Allergies     Allergies   Allergen Reactions    Sulfa (Sulfonamide Antibiotics) [Sulfa Antibiotics] Rash     Headaches and dizziness.    Homeopathic Drugs [External Allergen Needs Reconciliation - See Comment] Unknown     runny nose    Mold Extracts [Molds & Smuts] Unknown    Morphine (Pf) [Morphine] Unknown     hallucinate    Lisinopril Itching, Cough and Unknown     cough    Sulfacetamide Sodium [Sulfacetamide] Rash       Review of Systems   A comprehensive review of 14 systems was done. Pertinent findings noted here and in history of present illness. All the rest negative.  Constitutional: Negative.  Negative for fever, chills, she has  activity change, appetite change and fatigue.   HENT: Negative for congestion and facial swelling.    Eyes: Negative for photophobia, redness and visual disturbance.   Respiratory: Negative for cough and chest tightness.    On supplemental o2 and reporting air hunger and tachypnea mostly precipitated by anxiety  Cardiovascular: Negative for chest pain, palpitations and has acute on chronic leg swelling.   Gastrointestinal: Negative for nausea, diarrhea, constipation, blood in stool and abdominal distention.   Genitourinary: has frequency  Musculoskeletal: Positive for increased weakness.   Skin: Ecchymoses diffusely on body.    Neurological: Negative for dizziness, tremors, syncope, weakness, light-headedness and headaches.   Hematological: Does bruise/bleed easily -no longer on Xarelto  Psychiatric/Behavioral: has severe anxiety  Patient believes this limits her ability to  "ambulate      Physical Exam   /72   Pulse 70   Temp 97.5  F (36.4  C)   Resp 18   Ht 1.575 m (5' 2\")   Wt 47.6 kg (105 lb)   SpO2 97%   BMI 19.20 kg/m     On 3l of o2  Constitutional: Oriented to person, place, and time.   HEENT:  Normocephalic and atraumatic.  Eyes: Conjunctivae normal. No discharge.  No scleral icterus. Nose normal. Mouth/Throat: Oropharynx is clear and moist.    Has vision impairment with a right-sided vision field cut now  CARDIOVASCULAR: Normal rate, regular rhythm and intact distal pulses.  PULMONARY: Effort normal and breath sounds normal. No respiratory distress. No Wheezing or rales.  ABDOMEN: Soft. No distension and no mass. There is no tenderness.  NEUROLOGICAL: Alert and oriented to person, place, and time. Coordination normal.   SKIN: Skin is warm and dry. No rash noted. No pallor. Bilateral lower extremity bleeding underneath the skin.  Scattered ecchymosis seen in arms and legs with OA area  EXTREMITIES: No cyanosis, no edema.  PSYCHIATRIC: Oriented to person, place, and time.      Lab Results     Recent Results (from the past 240 hour(s))   Comprehensive metabolic panel    Collection Time: 10/24/23  9:35 AM   Result Value Ref Range    Sodium 140 135 - 145 mmol/L    Potassium 3.6 3.4 - 5.3 mmol/L    Carbon Dioxide (CO2) 28 22 - 29 mmol/L    Anion Gap 8 7 - 15 mmol/L    Urea Nitrogen 24.9 (H) 8.0 - 23.0 mg/dL    Creatinine 1.45 (H) 0.51 - 0.95 mg/dL    GFR Estimate 37 (L) >60 mL/min/1.73m2    Calcium 8.6 (L) 8.8 - 10.2 mg/dL    Chloride 104 98 - 107 mmol/L    Glucose 129 (H) 70 - 99 mg/dL    Alkaline Phosphatase 49 35 - 104 U/L    AST 26 0 - 45 U/L    ALT 5 0 - 50 U/L    Protein Total 6.5 6.4 - 8.3 g/dL    Albumin 2.8 (L) 3.5 - 5.2 g/dL    Bilirubin Total 0.2 <=1.2 mg/dL   CBC with platelets and differential    Collection Time: 10/24/23  9:35 AM   Result Value Ref Range    WBC Count 8.2 4.0 - 11.0 10e3/uL    RBC Count 3.31 (L) 3.80 - 5.20 10e6/uL    Hemoglobin 9.4 (L) 11.7 " - 15.7 g/dL    Hematocrit 31.9 (L) 35.0 - 47.0 %    MCV 96 78 - 100 fL    MCH 28.4 26.5 - 33.0 pg    MCHC 29.5 (L) 31.5 - 36.5 g/dL    RDW 14.6 10.0 - 15.0 %    Platelet Count 176 150 - 450 10e3/uL    % Neutrophils 73 %    % Lymphocytes 9 %    % Monocytes 11 %    % Eosinophils 5 %    % Basophils 1 %    % Immature Granulocytes 1 %    NRBCs per 100 WBC 0 <1 /100    Absolute Neutrophils 6.1 1.6 - 8.3 10e3/uL    Absolute Lymphocytes 0.8 0.8 - 5.3 10e3/uL    Absolute Monocytes 0.9 0.0 - 1.3 10e3/uL    Absolute Eosinophils 0.4 0.0 - 0.7 10e3/uL    Absolute Basophils 0.1 0.0 - 0.2 10e3/uL    Absolute Immature Granulocytes 0.0 <=0.4 10e3/uL    Absolute NRBCs 0.0 10e3/uL   ECG 12-LEAD WITH MUSE (LHE)    Collection Time: 10/25/23  4:48 PM   Result Value Ref Range    Systolic Blood Pressure  mmHg    Diastolic Blood Pressure  mmHg    Ventricular Rate 85 BPM    Atrial Rate 85 BPM    OH Interval 262 ms    QRS Duration 122 ms     ms    QTc 504 ms    P Axis 75 degrees    R AXIS -79 degrees    T Axis 95 degrees    Interpretation ECG       Sinus rhythm with 1st degree A-V block  Left axis deviation  Non-specific intra-ventricular conduction delay  Nonspecific ST and T wave abnormality  Abnormal ECG  When compared with ECG of 30-SEP-2023 05:09,  OH interval has increased  Confirmed by SEE ED PROVIDER NOTE FOR, ECG INTERPRETATION (4000),  Dorinda Watkins ([id number]) on 10/25/2023 6:25:07 PM     Basic metabolic panel    Collection Time: 10/25/23  4:54 PM   Result Value Ref Range    Sodium 139 135 - 145 mmol/L    Potassium 3.8 3.4 - 5.3 mmol/L    Chloride 102 98 - 107 mmol/L    Carbon Dioxide (CO2) 28 22 - 29 mmol/L    Anion Gap 9 7 - 15 mmol/L    Urea Nitrogen 17.5 8.0 - 23.0 mg/dL    Creatinine 1.26 (H) 0.51 - 0.95 mg/dL    GFR Estimate 43 (L) >60 mL/min/1.73m2    Calcium 9.5 8.8 - 10.2 mg/dL    Glucose 93 70 - 99 mg/dL   Troponin T, High Sensitivity (now)    Collection Time: 10/25/23  4:54 PM   Result Value Ref Range     Troponin T, High Sensitivity 31 (H) <=14 ng/L   N terminal pro BNP outpatient    Collection Time: 10/25/23  4:54 PM   Result Value Ref Range    N Terminal Pro BNP Outpatient 1,845 (H) 0 - 1,800 pg/mL   Blood gas venous    Collection Time: 10/25/23  4:54 PM   Result Value Ref Range    pH Venous 7.40 7.35 - 7.45    pCO2 Venous 51 (H) 35 - 50 mm Hg    pO2 Venous 21 (L) 25 - 47 mm Hg    Bicarbonate Venous 32 (H) 24 - 30 mmol/L    Base Excess/Deficit 6.7   mmol/L    Oxyhemoglobin Venous 29.9 (L) 70.0 - 75.0 %    O2 Sat, Venous 30.3 (L) 70.0 - 75.0 %   INR    Collection Time: 10/25/23  4:54 PM   Result Value Ref Range    INR 1.15 0.85 - 1.15   Hepatic function panel    Collection Time: 10/25/23  4:54 PM   Result Value Ref Range    Protein Total 7.7 6.4 - 8.3 g/dL    Albumin 3.5 3.5 - 5.2 g/dL    Bilirubin Total 0.3 <=1.2 mg/dL    Alkaline Phosphatase 54 35 - 104 U/L    AST 27 0 - 45 U/L    ALT <5 0 - 50 U/L    Bilirubin Direct <0.20 0.00 - 0.30 mg/dL   Procalcitonin    Collection Time: 10/25/23  4:54 PM   Result Value Ref Range    Procalcitonin 0.11 (H) <0.05 ng/mL   CBC with platelets and differential    Collection Time: 10/25/23  4:54 PM   Result Value Ref Range    WBC Count 15.0 (H) 4.0 - 11.0 10e3/uL    RBC Count 3.71 (L) 3.80 - 5.20 10e6/uL    Hemoglobin 10.6 (L) 11.7 - 15.7 g/dL    Hematocrit 34.6 (L) 35.0 - 47.0 %    MCV 93 78 - 100 fL    MCH 28.6 26.5 - 33.0 pg    MCHC 30.6 (L) 31.5 - 36.5 g/dL    RDW 14.4 10.0 - 15.0 %    Platelet Count 205 150 - 450 10e3/uL    % Neutrophils 74 %    % Lymphocytes 11 %    % Monocytes 11 %    % Eosinophils 3 %    % Basophils 0 %    % Immature Granulocytes 1 %    NRBCs per 100 WBC 0 <1 /100    Absolute Neutrophils 11.2 (H) 1.6 - 8.3 10e3/uL    Absolute Lymphocytes 1.6 0.8 - 5.3 10e3/uL    Absolute Monocytes 1.6 (H) 0.0 - 1.3 10e3/uL    Absolute Eosinophils 0.4 0.0 - 0.7 10e3/uL    Absolute Basophils 0.1 0.0 - 0.2 10e3/uL    Absolute Immature Granulocytes 0.1 <=0.4 10e3/uL     Absolute NRBCs 0.0 10e3/uL   Symptomatic Influenza A/B, RSV, & SARS-CoV2 PCR (COVID-19) Nasopharyngeal    Collection Time: 10/25/23  4:55 PM    Specimen: Nasopharyngeal; Swab   Result Value Ref Range    Influenza A PCR Negative Negative    Influenza B PCR Negative Negative    RSV PCR Negative Negative    SARS CoV2 PCR Negative Negative   Blood Culture Peripheral Blood    Collection Time: 10/25/23  6:32 PM    Specimen: Peripheral Blood   Result Value Ref Range    Culture No growth after 4 days    Lactic acid whole blood    Collection Time: 10/25/23  9:30 PM   Result Value Ref Range    Lactic Acid 1.2 0.7 - 2.0 mmol/L   Blood Culture Peripheral Blood    Collection Time: 10/25/23  9:30 PM    Specimen: Peripheral Blood   Result Value Ref Range    Culture No growth after 4 days    UA with Microscopic reflex to Culture    Collection Time: 10/25/23 10:21 PM    Specimen: Urine, NOS   Result Value Ref Range    Color Urine Yellow Colorless, Straw, Light Yellow, Yellow    Appearance Urine Clear Clear    Glucose Urine Negative Negative mg/dL    Bilirubin Urine Negative Negative    Ketones Urine Trace (A) Negative mg/dL    Specific Gravity Urine 1.022 1.001 - 1.030    Blood Urine Negative Negative    pH Urine 6.5 5.0 - 7.0    Protein Albumin Urine 10 (A) Negative mg/dL    Urobilinogen Urine <2.0 <2.0 mg/dL    Nitrite Urine Negative Negative    Leukocyte Esterase Urine Negative Negative    RBC Urine 1 <=2 /HPF    WBC Urine 1 <=5 /HPF    Squamous Epithelials Urine <1 <=1 /HPF   Comprehensive metabolic panel    Collection Time: 10/26/23  5:06 AM   Result Value Ref Range    Sodium 141 135 - 145 mmol/L    Potassium 3.7 3.4 - 5.3 mmol/L    Carbon Dioxide (CO2) 25 22 - 29 mmol/L    Anion Gap 9 7 - 15 mmol/L    Urea Nitrogen 18.5 8.0 - 23.0 mg/dL    Creatinine 1.22 (H) 0.51 - 0.95 mg/dL    GFR Estimate 45 (L) >60 mL/min/1.73m2    Calcium 8.2 (L) 8.8 - 10.2 mg/dL    Chloride 107 98 - 107 mmol/L    Glucose 144 (H) 70 - 99 mg/dL     Alkaline Phosphatase 46 35 - 104 U/L    AST 21 0 - 45 U/L    ALT <5 0 - 50 U/L    Protein Total 6.3 (L) 6.4 - 8.3 g/dL    Albumin 2.9 (L) 3.5 - 5.2 g/dL    Bilirubin Total 0.3 <=1.2 mg/dL   CBC with platelets and differential    Collection Time: 10/26/23  5:06 AM   Result Value Ref Range    WBC Count 21.2 (H) 4.0 - 11.0 10e3/uL    RBC Count 3.02 (L) 3.80 - 5.20 10e6/uL    Hemoglobin 8.8 (L) 11.7 - 15.7 g/dL    Hematocrit 28.2 (L) 35.0 - 47.0 %    MCV 93 78 - 100 fL    MCH 29.1 26.5 - 33.0 pg    MCHC 31.2 (L) 31.5 - 36.5 g/dL    RDW 14.6 10.0 - 15.0 %    Platelet Count 170 150 - 450 10e3/uL    % Neutrophils 90 %    % Lymphocytes 4 %    % Monocytes 5 %    % Eosinophils 0 %    % Basophils 0 %    % Immature Granulocytes 1 %    NRBCs per 100 WBC 0 <1 /100    Absolute Neutrophils 19.2 (H) 1.6 - 8.3 10e3/uL    Absolute Lymphocytes 0.8 0.8 - 5.3 10e3/uL    Absolute Monocytes 1.1 0.0 - 1.3 10e3/uL    Absolute Eosinophils 0.0 0.0 - 0.7 10e3/uL    Absolute Basophils 0.0 0.0 - 0.2 10e3/uL    Absolute Immature Granulocytes 0.1 <=0.4 10e3/uL    Absolute NRBCs 0.0 10e3/uL   CBC with platelets    Collection Time: 10/27/23  4:27 AM   Result Value Ref Range    WBC Count 23.2 (H) 4.0 - 11.0 10e3/uL    RBC Count 3.21 (L) 3.80 - 5.20 10e6/uL    Hemoglobin 9.2 (L) 11.7 - 15.7 g/dL    Hematocrit 29.8 (L) 35.0 - 47.0 %    MCV 93 78 - 100 fL    MCH 28.7 26.5 - 33.0 pg    MCHC 30.9 (L) 31.5 - 36.5 g/dL    RDW 14.6 10.0 - 15.0 %    Platelet Count 172 150 - 450 10e3/uL   Comprehensive metabolic panel    Collection Time: 10/27/23  4:27 AM   Result Value Ref Range    Sodium 142 135 - 145 mmol/L    Potassium 3.5 3.4 - 5.3 mmol/L    Carbon Dioxide (CO2) 29 22 - 29 mmol/L    Anion Gap 6 (L) 7 - 15 mmol/L    Urea Nitrogen 17.3 8.0 - 23.0 mg/dL    Creatinine 1.15 (H) 0.51 - 0.95 mg/dL    GFR Estimate 49 (L) >60 mL/min/1.73m2    Calcium 8.5 (L) 8.8 - 10.2 mg/dL    Chloride 107 98 - 107 mmol/L    Glucose 88 70 - 99 mg/dL    Alkaline Phosphatase 54 35 -  104 U/L    AST 22 0 - 45 U/L    ALT <5 0 - 50 U/L    Protein Total 6.4 6.4 - 8.3 g/dL    Albumin 3.0 (L) 3.5 - 5.2 g/dL    Bilirubin Total 0.2 <=1.2 mg/dL   MR Brain w/o & w Contrast    Collection Time: 10/27/23 11:35 AM   Result Value Ref Range    Radiologist flags (AA)      Interval development of large hemorrhagic metastatic lesions in both occipital lobes.   Surgical Pathology Exam    Collection Time: 10/27/23 12:20 PM   Result Value Ref Range    Case Report       Surgical Pathology Report                         Case: [id number]                                  Authorizing Provider:  Sebas Puri MD         Collected:           10/27/2023 12:20 PM          Ordering Location:     [facility]          Received:            10/27/2023 12:34 PM                                 [facility]                                                                           ICU                                                                          Pathologist:           Lee Canales MD                                                        Specimen:    Other, right back nodule                                                                   Final Diagnosis       A(). 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  Pending p63 (squamous), PD-L1, CK20 (hindgut), CD68 (sarcoma) and lung carcinoma MDL studies.  I am happy to review any subsequent data.    Cytology and histology demonstrate a poorly differentiated cohesive neoplasm most consistent with poorly differentiated squamous  cell carcinoma with a component of glandular differentiation.  The morphology is somewhat bizarre and therefore lineage markers are being performed for confirmation.  All markers thus far are negative in the tumor cells (Ck7-foregut; Melan-A-melanoma; PAX8-GYN/renal/B-cell; TTF-1 -lung and thyroid; GATA3-breast, urothelial, mesothelial, T-cell; estrogen receptor- 0+, breast).      Clinical Information       Clinical history:  Back nodule  Reason for procedure:  Back nodule      Gross Description       A(). Other, right back nodule:  Site #1, Episode #1, # Passes 3: Adequate. KDP    Biopsy was performed under Ultrasound guidance by Dr. Haro with 3 pass(es) from which:    3 Air-dried smear(s)  1 cell/tissue block slides are prepared and placed into formalin at 1235.    Assisted by:  DIAMOND Nuñez        Microscopic Description       Microscopic examination performed, substantiating the above diagnosis.       MCRS Yes (A) N/A    Performing Labs       The technical component of this testing was completed at [facility]      Case Images              Electronically signed by    Paola Rose MD

## 2023-10-30 NOTE — LETTER
Hand-off  for Care Coordination    Att: Discharge Planner:  Please if able, fax or call the number listed below when the below patient is discharged from your facility.  Clinic Care Coordination from patient's Primary Care Clinic will outreach to patient upon discharge to assist with TCU transition/discharge.    Thank you      Patient Name:   Irene León  Patient :     1945  Patient PCP:     Pankaj Montanez MD    Patient Primary Clinic:   [address] [place] [address]   [facility] [id number]  D/C Facility: _____________________________________________   TCU Contact Info for questions: ___________________________  D/C Date:  ______________________________________________  Follow-up Apt with PCP after TCU D/C:   ____________________  Other Follow-up Apt s:      _________________________________________  Additional information (concerns, and Home Care, ect ):   __________________________________________________________________  __________________________________________________________________  Care Coordinator to Contact at [place]  Fax to: [phone]  Attn:  Anushka Morales RN Clinic Care Coordination [facility]  Phone: [phone]

## 2023-10-30 NOTE — PROGRESS NOTES
[facility]: Cancer Care                                                                                          Dr. Suarez said to cancel CT and bx on 10/31/23 and she would like to see patient 11/1 or 11/2. She said can be a virtual meeting and she requests daughter, Leelee, and any other family members be in attendance with patient.    Called daughterLeelee. Consent to communicate on file (4/17/23).  Mailbox full. Unable to leave message.    RODOLFO Page from [facility] called wanting an update on schedule. She said she had Leelee with her. She placed Leelee on speaker phone.  Explained Dr. Suarez would is requesting CT and biopsy be canceled on 10/31/23 and she would like to do a virtual visit with them on 11/1 or 11/2. Leelee is agreeable and said 11/1 in the morning works best.  Offered virtual visit with Dr. Suarez at 930 on 11/1/23. Leelee agreed. Virtual roomers can call her # at [phone].    CT and biopsy canceled.   Marlene Linn, Scheduling, updated patient's schedule.    Signature:  Erin Nogueira RN     no concerns

## 2023-10-30 NOTE — LETTER
10/30/2023        RE: Irene León  [address] [address]        [facility] GERIATRIC SERVICES       Patient Irene León  MRN: [id number]        Reason for Visit     Chief Complaint   Patient presents with     Hospital F/U     RE-ADMIT       Code Status     DNR / DNI    Assessment     COPD exacerbation  Acute on chronic hypoxic respiratory failure  HFrEF  Breast cancer  Lung cancer on oral chemotherapy metastatic with progression noted  New left and right occipital lobe masses likely brain metastatic disease  CKS  Factor V Leiden  Hyperlipidemia  Hypertension  Depression  Anxiety  CKD  GERD    Plan     Pt is admitted to TCU for strengthening and rehab.  Patient was admitted with hypoxic respiratory failure with severe COPD.   Unfortunately was readmitted with worsening symptoms and disease progression.  Seen by her primary oncologist and recommended palliative treatment  Now is scheduled for a CT chest abdomen and pelvis with contrast tomorrow.  Neurosurgery was involved in the care because of presence of vasogenic edema with brain metastases  No steroids or antiepileptics were recommended by them  Back biopsy results are back and are consistent with poorly differentiated carcinoma most likely lung cancer  In light of her advancing disease she has been taken off her rivaroxaban due to hemorrhagic nature of her upper intracranial mets.  Anxiety remains her significant concern and she is on Ativan as well as hydroxyzine.  Reports that is limiting her ability to progress in therapy  She is adamant that she wants to go home and does not want to stay in long-term care.  She will have enough family support  I have reviewed her overall poor prognosis and requested hospice this was reviewed with her daughter.  Get encouraging patient and her daughter to have a discussion with her primary oncologist before making a final decision    History     Patient is a very pleasant 78 year old female who is  readmitted to TCU    Hospitalized for acute hypoxic respiratory failure secondary to COPD and CHF. Noted to have aspiration pneumonitis. Improved with ceftriaxone, doxycyline, and prednisone. Discharged with Augmentin which patient has finished. Cardiology changed lasix to Bumex.   She was in the TCU when she noticed a lump in her back concerning for metastatic spread.  Oncology was consulted and she has had a biopsy.  She was readmitted to the hospital with respiratory distress and noted to have metastatic spread of her cancer  Now has vision impairment and bilateral intracranial mets  Discharged back to the TCU as she wants to do some degree of rehabilitation before discharging home and is adamant she will not go to a long-term care    Past Medical & Surgical History     PAST MEDICAL HISTORY:   Past Medical History:   Diagnosis Date     ANATOLIY (acute kidney injury) (H24)      Allergic rhinitis      Arrhythmia      Atrial fibrillation with RVR (H)      Bacteremia      Breast cancer (H) 2017     Cardiomyopathy (H)      Centrilobular emphysema (H)      CHF (congestive heart failure) (H)      Chronic kidney disease      CKD (chronic kidney disease)      COPD (chronic obstructive pulmonary disease) (H)      Coronary artery disease      Depression      Dysphagia      E. coli sepsis (H)      Factor 5 Leiden mutation, heterozygous (H24)      GERD (gastroesophageal reflux disease)      Heart failure with reduced ejection fraction (H) 04/17/2023     Hx of radiation therapy 2017     Hyperlipidemia      Hypertension      Hypokalemia      Hypomagnesemia      Idiopathic cardiomyopathy (H)      Left hip pain 04/30/2014     OAB (overactive bladder)      Osteoporosis      Sacral insufficiency fracture      Sinusitis      Syncope      Urge incontinence      Vocal cord dysfunction       PAST SURGICAL HISTORY:   has a past surgical history that includes IR Abdominal Aortogram (4/16/2003); IR Miscellaneous Procedure (4/16/2003); IR  Aortic Arch 4 Vessel Angiogram (4/16/2003); Arthroplasty revision hip (Left); OPEN TX FEMORAL FRACTURE DISTAL MED/LAT CONDYLE (Left, 10/28/2015); Cardiac catheterization; Cataract Extraction (Left, 07/18/2017); Biopsy breast (Right, 2017); Bladder surgery; Lumpectomy breast (Left, 06/2017); PICC/Midline Placement (4/7/2022); PICC/Midline Placement (4/11/2022); and PICC/Midline Placement (10/25/2023).      Past Social History     Reviewed,  reports that she quit smoking about 16 years ago. Her smoking use included cigarettes. She has been exposed to tobacco smoke. She quit smokeless tobacco use about 19 years ago. She reports that she does not drink alcohol and does not use drugs.    Family History     Reviewed, and family history includes Breast Cancer in her maternal aunt; Osteoporosis in an other family member; Prostate Cancer in her brother and maternal uncle.    Medication List     Current Outpatient Medications   Medication     acetaminophen (TYLENOL) 500 MG tablet     acetylcysteine (MUCOMYST) 10 % nebulizer solution     albuterol (PROAIR HFA/PROVENTIL HFA/VENTOLIN HFA) 108 (90 Base) MCG/ACT inhaler     Azelastine HCl 137 MCG/SPRAY SOLN     benzonatate (TESSALON PERLES) 100 MG capsule     cetirizine (ZYRTEC) 5 MG tablet     chlorhexidine (PERIDEX) 0.12 % solution     escitalopram (LEXAPRO) 10 MG tablet     famotidine (PEPCID) 20 MG tablet     fluticasone (FLONASE) 50 MCG/ACT nasal spray     Fluticasone-Umeclidin-Vilanterol (TRELEGY ELLIPTA) 200-62.5-25 MCG/INH oral inhaler     hydrOXYzine (ATARAX) 25 MG tablet     ipratropium (ATROVENT HFA) 17 MCG/ACT inhaler     ipratropium - albuterol 0.5 mg/2.5 mg/3 mL (DUONEB) 0.5-2.5 (3) MG/3ML neb solution     LORazepam (ATIVAN) 0.5 MG tablet     Melatonin 5 MG TBDP     metoprolol succinate ER (TOPROL XL) 50 MG 24 hr tablet     mirabegron (MYRBETRIQ) 25 MG 24 hr tablet     nystatin (MYCOSTATIN) 507138 UNIT/GM external cream     OLANZapine (ZYPREXA) 5 MG tablet      predniSONE (DELTASONE) 20 MG tablet     prochlorperazine (COMPAZINE) 10 MG tablet     simvastatin (ZOCOR) 40 MG tablet     sodium chloride 0.9 % neb solution     sotorasib (LUMAKRAS) 120 MG tablet     spironolactone (ALDACTONE) 25 MG tablet     tamoxifen (NOLVADEX) 20 MG tablet     traMADol (ULTRAM) 50 MG tablet     zinc oxide (DESITIN) 40 % external ointment     No current facility-administered medications for this visit.      MED REC REQUIRED  Post Medication Reconciliation Status: discharge medications reconciled and changed, per note/orders       Allergies     Allergies   Allergen Reactions     Sulfa (Sulfonamide Antibiotics) [Sulfa Antibiotics] Rash     Headaches and dizziness.     Homeopathic Drugs [External Allergen Needs Reconciliation - See Comment] Unknown     runny nose     Mold Extracts [Molds & Smuts] Unknown     Morphine (Pf) [Morphine] Unknown     hallucinate     Lisinopril Itching, Cough and Unknown     cough     Sulfacetamide Sodium [Sulfacetamide] Rash       Review of Systems   A comprehensive review of 14 systems was done. Pertinent findings noted here and in history of present illness. All the rest negative.  Constitutional: Negative.  Negative for fever, chills, she has  activity change, appetite change and fatigue.   HENT: Negative for congestion and facial swelling.    Eyes: Negative for photophobia, redness and visual disturbance.   Respiratory: Negative for cough and chest tightness.    On supplemental o2 and reporting air hunger and tachypnea mostly precipitated by anxiety  Cardiovascular: Negative for chest pain, palpitations and has acute on chronic leg swelling.   Gastrointestinal: Negative for nausea, diarrhea, constipation, blood in stool and abdominal distention.   Genitourinary: has frequency  Musculoskeletal: Positive for increased weakness.   Skin: Ecchymoses diffusely on body.    Neurological: Negative for dizziness, tremors, syncope, weakness, light-headedness and headaches.  "  Hematological: Does bruise/bleed easily -no longer on Xarelto  Psychiatric/Behavioral: has severe anxiety  Patient believes this limits her ability to ambulate      Physical Exam   /72   Pulse 70   Temp 97.5  F (36.4  C)   Resp 18   Ht 1.575 m (5' 2\")   Wt 47.6 kg (105 lb)   SpO2 97%   BMI 19.20 kg/m     On 3l of o2  Constitutional: Oriented to person, place, and time.   HEENT:  Normocephalic and atraumatic.  Eyes: Conjunctivae normal. No discharge.  No scleral icterus. Nose normal. Mouth/Throat: Oropharynx is clear and moist.    Has vision impairment with a right-sided vision field cut now  CARDIOVASCULAR: Normal rate, regular rhythm and intact distal pulses.  PULMONARY: Effort normal and breath sounds normal. No respiratory distress. No Wheezing or rales.  ABDOMEN: Soft. No distension and no mass. There is no tenderness.  NEUROLOGICAL: Alert and oriented to person, place, and time. Coordination normal.   SKIN: Skin is warm and dry. No rash noted. No pallor. Bilateral lower extremity bleeding underneath the skin.  Scattered ecchymosis seen in arms and legs with OA area  EXTREMITIES: No cyanosis, no edema.  PSYCHIATRIC: Oriented to person, place, and time.      Lab Results     Recent Results (from the past 240 hour(s))   Comprehensive metabolic panel    Collection Time: 10/24/23  9:35 AM   Result Value Ref Range    Sodium 140 135 - 145 mmol/L    Potassium 3.6 3.4 - 5.3 mmol/L    Carbon Dioxide (CO2) 28 22 - 29 mmol/L    Anion Gap 8 7 - 15 mmol/L    Urea Nitrogen 24.9 (H) 8.0 - 23.0 mg/dL    Creatinine 1.45 (H) 0.51 - 0.95 mg/dL    GFR Estimate 37 (L) >60 mL/min/1.73m2    Calcium 8.6 (L) 8.8 - 10.2 mg/dL    Chloride 104 98 - 107 mmol/L    Glucose 129 (H) 70 - 99 mg/dL    Alkaline Phosphatase 49 35 - 104 U/L    AST 26 0 - 45 U/L    ALT 5 0 - 50 U/L    Protein Total 6.5 6.4 - 8.3 g/dL    Albumin 2.8 (L) 3.5 - 5.2 g/dL    Bilirubin Total 0.2 <=1.2 mg/dL   CBC with platelets and differential    Collection " Time: 10/24/23  9:35 AM   Result Value Ref Range    WBC Count 8.2 4.0 - 11.0 10e3/uL    RBC Count 3.31 (L) 3.80 - 5.20 10e6/uL    Hemoglobin 9.4 (L) 11.7 - 15.7 g/dL    Hematocrit 31.9 (L) 35.0 - 47.0 %    MCV 96 78 - 100 fL    MCH 28.4 26.5 - 33.0 pg    MCHC 29.5 (L) 31.5 - 36.5 g/dL    RDW 14.6 10.0 - 15.0 %    Platelet Count 176 150 - 450 10e3/uL    % Neutrophils 73 %    % Lymphocytes 9 %    % Monocytes 11 %    % Eosinophils 5 %    % Basophils 1 %    % Immature Granulocytes 1 %    NRBCs per 100 WBC 0 <1 /100    Absolute Neutrophils 6.1 1.6 - 8.3 10e3/uL    Absolute Lymphocytes 0.8 0.8 - 5.3 10e3/uL    Absolute Monocytes 0.9 0.0 - 1.3 10e3/uL    Absolute Eosinophils 0.4 0.0 - 0.7 10e3/uL    Absolute Basophils 0.1 0.0 - 0.2 10e3/uL    Absolute Immature Granulocytes 0.0 <=0.4 10e3/uL    Absolute NRBCs 0.0 10e3/uL   ECG 12-LEAD WITH MUSE (LHE)    Collection Time: 10/25/23  4:48 PM   Result Value Ref Range    Systolic Blood Pressure  mmHg    Diastolic Blood Pressure  mmHg    Ventricular Rate 85 BPM    Atrial Rate 85 BPM    NV Interval 262 ms    QRS Duration 122 ms     ms    QTc 504 ms    P Axis 75 degrees    R AXIS -79 degrees    T Axis 95 degrees    Interpretation ECG       Sinus rhythm with 1st degree A-V block  Left axis deviation  Non-specific intra-ventricular conduction delay  Nonspecific ST and T wave abnormality  Abnormal ECG  When compared with ECG of 30-SEP-2023 05:09,  NV interval has increased  Confirmed by SEE ED PROVIDER NOTE FOR, ECG INTERPRETATION ([id number]),  Dorinda Watkins ([id number]) on 10/25/2023 6:25:07 PM     Basic metabolic panel    Collection Time: 10/25/23  4:54 PM   Result Value Ref Range    Sodium 139 135 - 145 mmol/L    Potassium 3.8 3.4 - 5.3 mmol/L    Chloride 102 98 - 107 mmol/L    Carbon Dioxide (CO2) 28 22 - 29 mmol/L    Anion Gap 9 7 - 15 mmol/L    Urea Nitrogen 17.5 8.0 - 23.0 mg/dL    Creatinine 1.26 (H) 0.51 - 0.95 mg/dL    GFR Estimate 43 (L) >60 mL/min/1.73m2    Calcium 9.5  8.8 - 10.2 mg/dL    Glucose 93 70 - 99 mg/dL   Troponin T, High Sensitivity (now)    Collection Time: 10/25/23  4:54 PM   Result Value Ref Range    Troponin T, High Sensitivity 31 (H) <=14 ng/L   N terminal pro BNP outpatient    Collection Time: 10/25/23  4:54 PM   Result Value Ref Range    N Terminal Pro BNP Outpatient 1,845 (H) 0 - 1,800 pg/mL   Blood gas venous    Collection Time: 10/25/23  4:54 PM   Result Value Ref Range    pH Venous 7.40 7.35 - 7.45    pCO2 Venous 51 (H) 35 - 50 mm Hg    pO2 Venous 21 (L) 25 - 47 mm Hg    Bicarbonate Venous 32 (H) 24 - 30 mmol/L    Base Excess/Deficit 6.7   mmol/L    Oxyhemoglobin Venous 29.9 (L) 70.0 - 75.0 %    O2 Sat, Venous 30.3 (L) 70.0 - 75.0 %   INR    Collection Time: 10/25/23  4:54 PM   Result Value Ref Range    INR 1.15 0.85 - 1.15   Hepatic function panel    Collection Time: 10/25/23  4:54 PM   Result Value Ref Range    Protein Total 7.7 6.4 - 8.3 g/dL    Albumin 3.5 3.5 - 5.2 g/dL    Bilirubin Total 0.3 <=1.2 mg/dL    Alkaline Phosphatase 54 35 - 104 U/L    AST 27 0 - 45 U/L    ALT <5 0 - 50 U/L    Bilirubin Direct <0.20 0.00 - 0.30 mg/dL   Procalcitonin    Collection Time: 10/25/23  4:54 PM   Result Value Ref Range    Procalcitonin 0.11 (H) <0.05 ng/mL   CBC with platelets and differential    Collection Time: 10/25/23  4:54 PM   Result Value Ref Range    WBC Count 15.0 (H) 4.0 - 11.0 10e3/uL    RBC Count 3.71 (L) 3.80 - 5.20 10e6/uL    Hemoglobin 10.6 (L) 11.7 - 15.7 g/dL    Hematocrit 34.6 (L) 35.0 - 47.0 %    MCV 93 78 - 100 fL    MCH 28.6 26.5 - 33.0 pg    MCHC 30.6 (L) 31.5 - 36.5 g/dL    RDW 14.4 10.0 - 15.0 %    Platelet Count 205 150 - 450 10e3/uL    % Neutrophils 74 %    % Lymphocytes 11 %    % Monocytes 11 %    % Eosinophils 3 %    % Basophils 0 %    % Immature Granulocytes 1 %    NRBCs per 100 WBC 0 <1 /100    Absolute Neutrophils 11.2 (H) 1.6 - 8.3 10e3/uL    Absolute Lymphocytes 1.6 0.8 - 5.3 10e3/uL    Absolute Monocytes 1.6 (H) 0.0 - 1.3 10e3/uL     Absolute Eosinophils 0.4 0.0 - 0.7 10e3/uL    Absolute Basophils 0.1 0.0 - 0.2 10e3/uL    Absolute Immature Granulocytes 0.1 <=0.4 10e3/uL    Absolute NRBCs 0.0 10e3/uL   Symptomatic Influenza A/B, RSV, & SARS-CoV2 PCR (COVID-19) Nasopharyngeal    Collection Time: 10/25/23  4:55 PM    Specimen: Nasopharyngeal; Swab   Result Value Ref Range    Influenza A PCR Negative Negative    Influenza B PCR Negative Negative    RSV PCR Negative Negative    SARS CoV2 PCR Negative Negative   Blood Culture Peripheral Blood    Collection Time: 10/25/23  6:32 PM    Specimen: Peripheral Blood   Result Value Ref Range    Culture No growth after 4 days    Lactic acid whole blood    Collection Time: 10/25/23  9:30 PM   Result Value Ref Range    Lactic Acid 1.2 0.7 - 2.0 mmol/L   Blood Culture Peripheral Blood    Collection Time: 10/25/23  9:30 PM    Specimen: Peripheral Blood   Result Value Ref Range    Culture No growth after 4 days    UA with Microscopic reflex to Culture    Collection Time: 10/25/23 10:21 PM    Specimen: Urine, NOS   Result Value Ref Range    Color Urine Yellow Colorless, Straw, Light Yellow, Yellow    Appearance Urine Clear Clear    Glucose Urine Negative Negative mg/dL    Bilirubin Urine Negative Negative    Ketones Urine Trace (A) Negative mg/dL    Specific Gravity Urine 1.022 1.001 - 1.030    Blood Urine Negative Negative    pH Urine 6.5 5.0 - 7.0    Protein Albumin Urine 10 (A) Negative mg/dL    Urobilinogen Urine <2.0 <2.0 mg/dL    Nitrite Urine Negative Negative    Leukocyte Esterase Urine Negative Negative    RBC Urine 1 <=2 /HPF    WBC Urine 1 <=5 /HPF    Squamous Epithelials Urine <1 <=1 /HPF   Comprehensive metabolic panel    Collection Time: 10/26/23  5:06 AM   Result Value Ref Range    Sodium 141 135 - 145 mmol/L    Potassium 3.7 3.4 - 5.3 mmol/L    Carbon Dioxide (CO2) 25 22 - 29 mmol/L    Anion Gap 9 7 - 15 mmol/L    Urea Nitrogen 18.5 8.0 - 23.0 mg/dL    Creatinine 1.22 (H) 0.51 - 0.95 mg/dL    GFR  Estimate 45 (L) >60 mL/min/1.73m2    Calcium 8.2 (L) 8.8 - 10.2 mg/dL    Chloride 107 98 - 107 mmol/L    Glucose 144 (H) 70 - 99 mg/dL    Alkaline Phosphatase 46 35 - 104 U/L    AST 21 0 - 45 U/L    ALT <5 0 - 50 U/L    Protein Total 6.3 (L) 6.4 - 8.3 g/dL    Albumin 2.9 (L) 3.5 - 5.2 g/dL    Bilirubin Total 0.3 <=1.2 mg/dL   CBC with platelets and differential    Collection Time: 10/26/23  5:06 AM   Result Value Ref Range    WBC Count 21.2 (H) 4.0 - 11.0 10e3/uL    RBC Count 3.02 (L) 3.80 - 5.20 10e6/uL    Hemoglobin 8.8 (L) 11.7 - 15.7 g/dL    Hematocrit 28.2 (L) 35.0 - 47.0 %    MCV 93 78 - 100 fL    MCH 29.1 26.5 - 33.0 pg    MCHC 31.2 (L) 31.5 - 36.5 g/dL    RDW 14.6 10.0 - 15.0 %    Platelet Count 170 150 - 450 10e3/uL    % Neutrophils 90 %    % Lymphocytes 4 %    % Monocytes 5 %    % Eosinophils 0 %    % Basophils 0 %    % Immature Granulocytes 1 %    NRBCs per 100 WBC 0 <1 /100    Absolute Neutrophils 19.2 (H) 1.6 - 8.3 10e3/uL    Absolute Lymphocytes 0.8 0.8 - 5.3 10e3/uL    Absolute Monocytes 1.1 0.0 - 1.3 10e3/uL    Absolute Eosinophils 0.0 0.0 - 0.7 10e3/uL    Absolute Basophils 0.0 0.0 - 0.2 10e3/uL    Absolute Immature Granulocytes 0.1 <=0.4 10e3/uL    Absolute NRBCs 0.0 10e3/uL   CBC with platelets    Collection Time: 10/27/23  4:27 AM   Result Value Ref Range    WBC Count 23.2 (H) 4.0 - 11.0 10e3/uL    RBC Count 3.21 (L) 3.80 - 5.20 10e6/uL    Hemoglobin 9.2 (L) 11.7 - 15.7 g/dL    Hematocrit 29.8 (L) 35.0 - 47.0 %    MCV 93 78 - 100 fL    MCH 28.7 26.5 - 33.0 pg    MCHC 30.9 (L) 31.5 - 36.5 g/dL    RDW 14.6 10.0 - 15.0 %    Platelet Count 172 150 - 450 10e3/uL   Comprehensive metabolic panel    Collection Time: 10/27/23  4:27 AM   Result Value Ref Range    Sodium 142 135 - 145 mmol/L    Potassium 3.5 3.4 - 5.3 mmol/L    Carbon Dioxide (CO2) 29 22 - 29 mmol/L    Anion Gap 6 (L) 7 - 15 mmol/L    Urea Nitrogen 17.3 8.0 - 23.0 mg/dL    Creatinine 1.15 (H) 0.51 - 0.95 mg/dL    GFR Estimate 49 (L) >60  mL/min/1.73m2    Calcium 8.5 (L) 8.8 - 10.2 mg/dL    Chloride 107 98 - 107 mmol/L    Glucose 88 70 - 99 mg/dL    Alkaline Phosphatase 54 35 - 104 U/L    AST 22 0 - 45 U/L    ALT <5 0 - 50 U/L    Protein Total 6.4 6.4 - 8.3 g/dL    Albumin 3.0 (L) 3.5 - 5.2 g/dL    Bilirubin Total 0.2 <=1.2 mg/dL   MR Brain w/o & w Contrast    Collection Time: 10/27/23 11:35 AM   Result Value Ref Range    Radiologist flags (AA)      Interval development of large hemorrhagic metastatic lesions in both occipital lobes.   Surgical Pathology Exam    Collection Time: 10/27/23 12:20 PM   Result Value Ref Range    Case Report       Surgical Pathology Report                         Case: [id number]                                  Authorizing Provider:  Sebas Puri MD         Collected:           10/27/2023 12:20 PM          Ordering Location:     [facility] [place]          Received:            10/27/2023 12:34 PM                                 [facility]                                                                           ICU                                                                          Pathologist:           Lee Canales MD                                                        Specimen:    Other, right back nodule                                                                   Final Diagnosis       A(). 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  Pending p63 (squamous), PD-L1, CK20 (hindgut), CD68 (sarcoma) and lung carcinoma MDL studies.  I am happy to review any  subsequent data.    Cytology and histology demonstrate a poorly differentiated cohesive neoplasm most consistent with poorly differentiated squamous cell carcinoma with a component of glandular differentiation.  The morphology is somewhat bizarre and therefore lineage markers are being performed for confirmation.  All markers thus far are negative in the tumor cells (Ck7-foregut; Melan-A-melanoma; PAX8-GYN/renal/B-cell; TTF-1 -lung and thyroid; GATA3-breast, urothelial, mesothelial, T-cell; estrogen receptor- 0+, breast).      Clinical Information       Clinical history:  Back nodule  Reason for procedure:  Back nodule      Gross Description       A(). Other, right back nodule:  Site #1, Episode #1, # Passes 3: Adequate. KDP    Biopsy was performed under Ultrasound guidance by Dr. Haro with 3 pass(es) from which:    3 Air-dried smear(s)  1 cell/tissue block slides are prepared and placed into formalin at 1235.    Assisted by:  DIAMOND Nuñez        Microscopic Description       Microscopic examination performed, substantiating the above diagnosis.       MCRS Yes (A) N/A    Performing Labs       The technical component of this testing was completed at [facility]      Case Images              Electronically signed by    Paola Rose MD      Sincerely,        VASHTI Sanchez

## 2023-10-30 NOTE — PROGRESS NOTES
Clinic Care Coordination Contact  Care Coordination Transition Communication        Clinical Data: Patient was hospitalized at  from 9/23 to 10/5 with diagnosis of   Left hip pain     Clinical Data: Patient was hospitalized at  from 10/25 to 10/28 with diagnosis of Acute on chronic hypoxic respiratory failure  COPD exacerbation  NSCLC with metastatic disease progressing  New left and right occipital lobe masses, likely brain metastatic disease  Anxiety .     Assessment: Patient has transitioned to TCU/ARU for short term rehabilitation:    Facility Name:  [facility]   Transition Communication:  Notified facility of Primary Care- Care Coordination support via Epic fax.    Plan: Care Coordinator will await notification from facility staff informing of patient's discharge plans/needs. Care Coordinator will review chart and outreach to facility staff every 4 weeks and as needed.     See hospital discharge planner note 9/25
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
## 2023-10-31 NOTE — PATIENT INSTRUCTIONS
It was good to see you today, Stephanie and Leelee.  I am sorry about your  passing.    Here are the things we talked about:  I placed an informational referral to [facility] hospice and will ask they see you within a few days.  If you decide to enroll, let anyone on your care team and they can place a hospice order for you.    I'll be thinking of all of you over these next weeks and hope there is peace for you.       How to get a hold of us:  For non-urgent matters, MyChart works best.    For more urgent matters, or if you prefer not to use MyChart, call our clinic nurse coordinator Anabela Hillman RN at [phone]    We have an on-call number for evenings and weekends. Please call this only if you are having uncontrolled symptoms or serious side effects from your medicines: [phone].     For refills, please give us a week (5 working days) notice. We don't always have providers available everyday to do refills. If you call the day you run out of your medicine, we may not be able to refill it in time, so call 5 days in advance!    Tobi Miguel MD MS FAAFP CAQHPM  [facility]  Office [phone]  Fax [phone]

## 2023-10-31 NOTE — TELEPHONE ENCOUNTER
[facility] Geriatrics Triage Nurse Telephone Encounter    Provider: GERA Galeana  Facility: [facility]  Facility Type:  TCU    Caller: Roya    Allergies:    Allergies   Allergen Reactions    Sulfa (Sulfonamide Antibiotics) [Sulfa Antibiotics] Rash     Headaches and dizziness.    Homeopathic Drugs [External Allergen Needs Reconciliation - See Comment] Unknown     runny nose    Mold Extracts [Molds & Smuts] Unknown    Morphine (Pf) [Morphine] Unknown     hallucinate    Lisinopril Itching, Cough and Unknown     cough    Sulfacetamide Sodium [Sulfacetamide] Rash        Reason for call: Pt experiencing increased breakthrough pain and is forgetting to ask for PRN Tylenol and Tramadol in an adequate amount of time to prevent severe pain. Asking if these PRN medications can be changed to scheduled and also have a PRN dose for breakthrough pain management.    Verbal Order/Direction given by Provider:   - Tylenol 1000mg TID and may also take 1000mg daily PRN; Give at least 6 hours apart   - Tramadol 50mg PO TID     Provider giving Order:  GERA Galeana    Verbal Order given to: Roya Griffiths RN

## 2023-11-01 NOTE — PROGRESS NOTES
[facility]: Cancer Care                                                                                          Faxed orders to [facility] at [phone] at 1414.  New orders effective 11/1/23:  Stop taking sotorasib (Lumakras)  Stop taking tamoxifen  Start taking dexamethasone: 1 tablet (1 mg) by mouth 2 times daily (take with meals).    Right Fax confirmed sent.    Called [facility] at [phone] and talked with Maia.  Maia confirmed fax received.    Signature:  Erin Nogueira, RN

## 2023-11-01 NOTE — NURSING NOTE
Is the patient currently in the state of [place]? YES    Visit mode:VIDEO    If the visit is dropped, the patient can be reconnected by: TELEPHONE VISIT: Phone number: [phone]    Will anyone else be joining the visit? Yes, daughter Leelee is there with her. (If patient encounters technical issues they should call [phone]    How would you like to obtain your AVS? Mail a copy    Are changes needed to the allergy or medication list? No    Reason for visit: RECHECK    Ashly BEE

## 2023-11-01 NOTE — PROGRESS NOTES
Virtual Visit Details    Type of service:  Video Visit   Video Start Time: 9:42 AM  Video End Time:10: 29 AM    Originating Location (pt. Location): TCU    Distant Location (provider location):  On-site  Platform used for Video Visit: Merged with [facility] Progress Note    Patient: Irene León  MRN: [id number]  Date of Service: Nov 1, 2023       Reason for Visit    Chief Complaint   Patient presents with    RECHECK       Assessment and Plan     Cancer Staging   Invasive ductal carcinoma of breast, female, left (H)  Staging form: Breast, AJCC 8th Edition  - Pathologic stage from 6/20/2017: Stage IA (pT1b, pN0(sn), cM0, G1, ER+, DC+, HER2-, Oncotype DX score: 6) - Signed by Devon Suarez MD on 7/4/2022      ECOG Performance    3 - Confined to bed/chair > 50% of time, capable of limited self care     Pain  Pain Score: No Pain (0)    #.   Metastatic poorly differentiated non-small cell carcinoma of the right upper lobe.  PD-L1 60%, KRAS G12C mutated.  #.  Bilateral occipital lobe brain mets with associated cerebral edema resulting partial vision loss   #.  History of invasive ductal carcinoma of the left breast.   Stage IA (pT1b, pN0 (sn),cM0). grade 1, associated DCIS, ER/DC positive, HER-2 negative, s/p left breast lumpectomy and left axillary sentinel lymph node biopsy. Right breast atypical ductal hyperplasia s/p right breast excisional biopsy on 6/20/2017.  She has several comorbidities including nonischemic cardiomyopathy (EF 40% in 3/17), osteoporosis on treatment with oral bisphosphonate, factor V Leiden mutation (details unknown). Started Tamoxifen in 12/2017.  #.  Severe COPD and chronic hypoxic respiratory failure, on supplemental oxygen- On 3 L at rest, 5-6 L with exertion.  #.  CHF with reduced EF of 20-25%  #.  CKD - 3b     I reviewed the MRI brain images and result in detail.  She has 2 larger brain mets on both occipital lobes about 3.6 cm and 2.4 cm in size with  vasogenic edema.    I reviewed the pathology result of the skin nodule which confirmed poorly differentiated non-small cell lung cancer.  PD-L1 expression was 60%.   I discussed the aggressive progression of lung cancer with appearance of skin nodule and several additional mets while she was taking sotorasib with minimal dose adjustment and interruption.  Therefore, I recommended to stop sotorasib since she is refractory to it.  Although her PD-L1 expression is 60%, I do not believe that immunotherapy will not benefit in terms of delay in progression or symptom management standpoint.  She is not a candidate for chemotherapy due to her several comorbidities and poor performance status.  Based on that, I recommended her to proceed with symptom focused care under hospice.  She is in agreement.   Regarding brain mets, we talked about consideration of palliative radiation by SRS (5 treatments over 2 weeks) or whole brain radiation (10 treatments over 2 weeks) due to symptomatic progression of brain mets prior to enrolling hospice.  I also discussed about starting steroids to help the symptoms related to cerebral edema (swelling in the brain).  We talked about potential complication from this condition could be blindness.  She was very afraid of that initially.  After thorough discussion of risk, benefits and alternatives, she decided not to proceed with palliative radiation to the brain.    Plan:  -Stop sotorasib.  -Stop tamoxifen.  -Start dexamethasone 1 mg twice daily to take with food for neurologic symptoms related to cerebral edema.  If she develops intolerable side effects from steroids, she will need to discontinue dexamethasone.  -No further cancer directed therapy is recommended.  -Recommended symptom focused care under hospice.  They are looking into residential hospice option.      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    - 1 sentinel lymph node removed, negative for carcinoma  - associated DCIS  - Right breast atypical ductal hyperplasia.    - Oncotype DX recurrence score 6 : 10 year risk of recurrence with 5 year of tamoxifen is 5%.    Therapy to date:  6/20/17 -right breast excisional biopsy AND left breast lumpectomy, left axillary sentinel lymph node biopsy  6/30/17-reexcision lumpectomy of the left breast  9/8/17-completed adjuvant radiation to the left breast 4256 cGy/16  12/6/17-8/11/2023- adjuvant endocrine therapy with tamoxifen.    8/2023-right upper lobe lung cancer with adjacent satellite nodules and thoracic lymph node metastasis   It was diagnosed after a progressively enlarging right upper lobe lung mass with satellite nodules.  Due to her severe COPD/emphysema, chronic hypoxic respiratory failure and increased risk of complications, lung or mediastinal lymph node biopsy was unable to obtain.  They are no other alternative site for biopsy.    Radiation is absolutely contraindicated with her pulmonary condition.  Her current performance status is not adequate for chemotherapy either.  She has strong desire to look into potential treatment option with the goal of  palliation including prolonging life expectancy. [id number] test was completed.  It showed KRAS G12C, TP53 mutations.     9/1/2023-started sotorasib 960 mg daily.  Held on 9/22/2023 due to confusion/delirium, hospitalization. Restart at 720 mg on 10/2/2023 and then increase back to 960 mg daily    Comorbidities  #.  Mild hypoxia and chronic cough.   She is closely follow with pulmonologist at [facility].    #. Hypertension, controlled.  #.  Osteoporosis  #.  COPD  #.  Idiopathic cardiomyopathy- LVEF 40% in October 2018, no change from prior.      History of Present Illness    Ms. Irene León presented today accompanied by her daughter, Leelee and her son.    She has peripheral vision loss significantly on the right side than the left.  She is able to operate and move around okay but not able to do anything strenuous.  She does not have pain currently.  She does not notice any other new skin lesion.  She does not have headaches.    She reported she had a good meeting with Dr. Miguel from palliative care yesterday.    Review of systems  Apart from describing in HPI, the remainder of comprehensive ROS was negative.    Past History    Past Medical History:   Diagnosis Date    ANATOLIY (acute kidney injury) (H24)     Allergic rhinitis     Arrhythmia     Atrial fibrillation with RVR (H)     Bacteremia     Breast cancer (H) 2017    Cardiomyopathy (H)     Centrilobular emphysema (H)     CHF (congestive heart failure) (H)     Chronic kidney disease     CKD (chronic kidney disease)     COPD (chronic obstructive pulmonary disease) (H)     Coronary artery disease     Depression     Dysphagia     E. coli sepsis (H)     Factor 5 Leiden mutation, heterozygous (H24)     GERD (gastroesophageal reflux disease)     Heart failure with reduced ejection fraction (H) 04/17/2023    Hx of radiation therapy 2017    Hyperlipidemia     Hypertension     Hypokalemia     Hypomagnesemia     Idiopathic cardiomyopathy (H)     Left hip pain 04/30/2014     "OAB (overactive bladder)     Osteoporosis     Sacral insufficiency fracture     Sinusitis     Syncope     Urge incontinence     Vocal cord dysfunction        Past Surgical History:   Procedure Laterality Date    ARTHROPLASTY REVISION HIP Left     BIOPSY BREAST Right 2017    BLADDER SURGERY      bladder interstim with removal    CARDIAC CATHETERIZATION      CATARACT EXTRACTION Left 07/18/2017    IR ABDOMINAL AORTOGRAM  4/16/2003    IR AORTIC ARCH 4 VESSEL ANGIOGRAM  4/16/2003    IR MISCELLANEOUS PROCEDURE  4/16/2003    LUMPECTOMY BREAST Left 06/2017    x2    PICC DOUBLE LUMEN PLACEMENT  4/11/2022         PICC TRIPLE LUMEN PLACEMENT  4/7/2022         PICC TRIPLE LUMEN PLACEMENT  10/25/2023    ZZC OPEN TX FEMORAL FRACTURE DISTAL MED/LAT CONDYLE Left 10/28/2015    Procedure: OPEN REDUCTION INTERNAL FIXATION LEFT  PROXIMAL FEMUR PERIPROSTHETIC FRACTURE;  Surgeon: Yovanny Albarran MD;  Location: [facility];  Service: Orthopedics       Physical Exam    Ht 1.499 m (4' 11\")   Wt 48.5 kg (107 lb)   BMI 21.61 kg/m      General: alert, awake, not in acute distress.  Frail.  She wears 3 L oxygen by nasal cannula.  She is sitting comfortably in bed.  CNS: non focal.  Oriented x3.  I witnessed that she has no loss of vision in certain angle when her daughter reaches to her.     Lab Results    Recent Results (from the past 168 hour(s))   CBC with platelets   Result Value Ref Range    WBC Count 23.2 (H) 4.0 - 11.0 10e3/uL    RBC Count 3.21 (L) 3.80 - 5.20 10e6/uL    Hemoglobin 9.2 (L) 11.7 - 15.7 g/dL    Hematocrit 29.8 (L) 35.0 - 47.0 %    MCV 93 78 - 100 fL    MCH 28.7 26.5 - 33.0 pg    MCHC 30.9 (L) 31.5 - 36.5 g/dL    RDW 14.6 10.0 - 15.0 %    Platelet Count 172 150 - 450 10e3/uL   Comprehensive metabolic panel   Result Value Ref Range    Sodium 142 135 - 145 mmol/L    Potassium 3.5 3.4 - 5.3 mmol/L    Carbon Dioxide (CO2) 29 22 - 29 mmol/L    Anion Gap 6 (L) 7 - 15 mmol/L    Urea Nitrogen 17.3 8.0 - 23.0 mg/dL    " Creatinine 1.15 (H) 0.51 - 0.95 mg/dL    GFR Estimate 49 (L) >60 mL/min/1.73m2    Calcium 8.5 (L) 8.8 - 10.2 mg/dL    Chloride 107 98 - 107 mmol/L    Glucose 88 70 - 99 mg/dL    Alkaline Phosphatase 54 35 - 104 U/L    AST 22 0 - 45 U/L    ALT <5 0 - 50 U/L    Protein Total 6.4 6.4 - 8.3 g/dL    Albumin 3.0 (L) 3.5 - 5.2 g/dL    Bilirubin Total 0.2 <=1.2 mg/dL   MR Brain w/o & w Contrast   Result Value Ref Range    Radiologist flags (AA)      Interval development of large hemorrhagic metastatic lesions in both occipital lobes.   Surgical Pathology Exam   Result Value Ref Range    Case Report       Surgical Pathology Report                         Case: [id number]                                  Authorizing Provider:  Sebas Puri MD         Collected:           10/27/2023 12:20 PM          Ordering Location:     [facility]          Received:            10/27/2023 12:34 PM                                 [facility]                                                                           ICU                                                                          Pathologist:           Lee Canales MD                                                        Specimen:    Other, right back nodule                                                                   Addendum       The purpose of addendum is report results of additional studies and discussion with Dr. Devon Suarez on 10/31/2023.  This is a complicated case with clinical and morphologic features compatible with pulmonary non-small cell carcinoma; however, the tumor cells are negative for p63 (squamous) and positive for CD68 raising consideration for soft tissue differentiation.  Still waiting on CK20 stain and lung cancer molecular studies at the time of dictation.  Given the CD68 expression, the case is being forwarded to [facility] for consultation (addendum).    RESULT FOR IMMUNOHISTOCHEMICAL VENTANA CLONE  PD-L1  ASSAY  TUMOR PROPORTION SCORE (TPS): 60 %  INTERPRETATION: HIGH PD-L1 EXPRESSION (TPS >/=50%)  COMMENT:  This Morrison  PD-L1 immunohistochemistry antibody assay is a laboratory developed test to be used for patients with non-small cell lung carcinoma (NSCLC), gastric or gastroesophageal junction (GEJ) adenocarcinoma, urothelial carcinomas, melanomas, liver, breast and brain tumors who are being considered for treatment with Keytruda (Pembrolizumab), an anti-PD-1 immune checkpoint inhibitor. The Morrison  PD-L1 assay has been validated by the [facility] against the FDA approved clinical trial-validated PharmDx 22C3 PD-L1 assay. Evidence suggests that the level of PD-L1 expression in the tumor cell population by immunohistochemistry is a major predictor of response to checkpoint inhibitor therapy. Previous studies demonstrate a high correlation between PD-L1 immunohistochemistry expression data obtained with PD-L1 clones Dako 22C3 and Morrison  in NSCLC. (References: Lancet 387:1540-50, 2016; :1823-33, 2016; J Clin Oncol 34:4102-9. 2016; J Thorac Oncol 12:1654-63, 2017; [facility] PD-L1 2018 assessment)  Scoring system: The tumor proportion score (TPS) is determined by enumeration of the percentage of PD-L1 tumor cells with any amount of membrane positivity expressed as a whole number relative to all viable tumor cells in the specimen. The scoring system for PD-L1 expression is divided into three groups: a) High expressor, for tumors with TPS >/= 50%; b) Low expressor, for tumors with TPS of >/=1%-49%; and c) Negative, for tumors with TPS <1%. If CPS score is reported, it is calculated based on the following formula: [(PD-L1 positive tumor cells + PD-L1 positive mononuclear inflammatory cells)/Total tumor cells] x 100.  Assay conditions:  - Fixation and processing: 10% neutral buffered formalin, paraffin embedded.  - Staining method: Morrison  predilute monoclonal PD-L1 antibody clone , standard heat induced epitope retrieval in cell conditioning 1 (CC1 - EDTA, alkaline pH), primary antibody incubation 16 minutes, [place] Optiview detection kit, and [place] BenchMark Ultra automated instrument.  - Minimum tumor cell requirement: >/= 100 viable tumor cells present in the specimen.  - Positive and negative controls react appropriately.           Final Diagnosis       A(). 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  Pending p63 (squamous), PD-L1, CK20 (hindgut), CD68 (sarcoma) and lung carcinoma MDL studies.  I am happy to review any subsequent data.    Cytology and histology demonstrate a poorly differentiated cohesive neoplasm most consistent with poorly differentiated squamous cell carcinoma with a component of glandular differentiation.  The morphology is somewhat bizarre and therefore lineage markers are being performed for confirmation.  All markers thus far are negative in the tumor cells (Ck7-foregut; Melan-A-melanoma; PAX8-GYN/renal/B-cell; TTF-1 -lung and thyroid; GATA3-breast, urothelial, mesothelial, T-cell; estrogen receptor- 0+, breast).      Clinical Information       Clinical history:  Back nodule  Reason for procedure:  Back nodule      Gross Description       A(). Other, right back nodule:  Site #1, Episode #1, # Passes 3: Adequate. KDP    Biopsy was performed under Ultrasound guidance by Dr. Haro with 3 pass(es) from which:    3 Air-dried smear(s)  1 cell/tissue block slides are prepared and placed into  formalin at 1235.    Assisted by:  DIAMOND Nuñez        Microscopic Description       Microscopic examination performed, substantiating the above diagnosis.       MCRS Yes (A) N/A    Performing Labs       The technical component of this testing was completed at [facility]      Case Images     ECG 12-LEAD WITH MUSE (LHE)   Result Value Ref Range    Systolic Blood Pressure 134 mmHg    Diastolic Blood Pressure 95 mmHg    Ventricular Rate 77 BPM    Atrial Rate 77 BPM    NJ Interval 126 ms    QRS Duration 116 ms     ms    QTc 482 ms    P Axis 79 degrees    R AXIS -58 degrees    T Axis 107 degrees    Interpretation ECG       Sinus rhythm  Left axis deviation  ST & T wave abnormality, consider lateral ischemia  Prolonged QT  Abnormal ECG  When compared with ECG of 25-OCT-2023 16:48,  NJ interval has decreased  ST now depressed in Inferior leads  T wave inversion more evident in Lateral leads  Confirmed by SEE ED PROVIDER NOTE FOR, ECG INTERPRETATION ([id number]),  Michael Zamorano ([id number]) on 11/2/2023 5:26:28 AM     INR   Result Value Ref Range    INR 1.08 0.85 - 1.15   Comprehensive metabolic panel   Result Value Ref Range    Sodium 142 135 - 145 mmol/L    Potassium 4.6 3.4 - 5.3 mmol/L    Carbon Dioxide (CO2) 25 22 - 29 mmol/L    Anion Gap 10 7 - 15 mmol/L    Urea Nitrogen 18.8 8.0 - 23.0 mg/dL    Creatinine 1.34 (H) 0.51 - 0.95 mg/dL    GFR Estimate 40 (L) >60 mL/min/1.73m2    Calcium 9.6 8.8 - 10.2 mg/dL    Chloride 107 98 - 107 mmol/L    Glucose 93 70 - 99 mg/dL    Alkaline Phosphatase 62 35 - 104 U/L    AST 28 0 - 45 U/L    ALT <5 0 - 50 U/L    Protein Total 7.2 6.4 - 8.3 g/dL    Albumin 3.4 (L) 3.5 - 5.2 g/dL    Bilirubin Total 0.2 <=1.2 mg/dL   Lipase   Result Value Ref Range    Lipase 60 13 - 60 U/L   Lactic acid whole blood   Result Value Ref Range    Lactic Acid 1.2 0.7 - 2.0 mmol/L   CRP inflammation   Result Value Ref Range    CRP Inflammation 15.60  (H) <5.00 mg/L   CBC with platelets and differential   Result Value Ref Range    WBC Count 39.7 (H) 4.0 - 11.0 10e3/uL    RBC Count 4.24 3.80 - 5.20 10e6/uL    Hemoglobin 12.0 11.7 - 15.7 g/dL    Hematocrit 40.4 35.0 - 47.0 %    MCV 95 78 - 100 fL    MCH 28.3 26.5 - 33.0 pg    MCHC 29.7 (L) 31.5 - 36.5 g/dL    RDW 15.1 (H) 10.0 - 15.0 %    Platelet Count 210 150 - 450 10e3/uL    % Neutrophils      % Lymphocytes      % Monocytes      % Eosinophils      % Basophils      % Immature Granulocytes      NRBCs per 100 WBC 0 <1 /100    Absolute Neutrophils      Absolute Lymphocytes      Absolute Monocytes      Absolute Eosinophils      Absolute Basophils      Absolute Immature Granulocytes      Absolute NRBCs 0.0 10e3/uL   Manual Differential   Result Value Ref Range    % Neutrophils 81 %    % Lymphocytes 4 %    % Monocytes 12 %    % Eosinophils 1 %    % Basophils 0 %    % Myelocytes 2 %    Absolute Neutrophils 32.2 (H) 1.6 - 8.3 10e3/uL    Absolute Lymphocytes 1.6 0.8 - 5.3 10e3/uL    Absolute Monocytes 4.8 (H) 0.0 - 1.3 10e3/uL    Absolute Eosinophils 0.4 0.0 - 0.7 10e3/uL    Absolute Basophils 0.0 0.0 - 0.2 10e3/uL    Absolute Myelocytes 0.8 (H) <=0.0 10e3/uL    RBC Morphology Confirmed RBC Indices     Platelet Assessment  Automated Count Confirmed. Platelet morphology is normal.     Automated Count Confirmed. Platelet morphology is normal.   Troponin T, High Sensitivity   Result Value Ref Range    Troponin T, High Sensitivity 27 (H) <=14 ng/L   Magnesium   Result Value Ref Range    Magnesium 2.3 1.7 - 2.3 mg/dL   Asymptomatic COVID-19 Virus (Coronavirus) by PCR Nasopharyngeal    Specimen: Nasopharyngeal; Swab   Result Value Ref Range    SARS CoV2 PCR Negative Negative   UA with Microscopic reflex to Culture    Specimen: Urine, NOS   Result Value Ref Range    Color Urine Light Yellow Colorless, Straw, Light Yellow, Yellow    Appearance Urine Clear Clear    Glucose Urine Negative Negative mg/dL    Bilirubin Urine Negative  Negative    Ketones Urine Negative Negative mg/dL    Specific Gravity Urine 1.025 1.001 - 1.030    Blood Urine Negative Negative    pH Urine 6.0 5.0 - 7.0    Protein Albumin Urine 10 (A) Negative mg/dL    Urobilinogen Urine <2.0 <2.0 mg/dL    Nitrite Urine Negative Negative    Leukocyte Esterase Urine Negative Negative    RBC Urine 1 <=2 /HPF    WBC Urine 2 <=5 /HPF    Squamous Epithelials Urine 1 <=1 /HPF   Basic metabolic panel   Result Value Ref Range    Sodium 141 135 - 145 mmol/L    Potassium 4.3 3.4 - 5.3 mmol/L    Chloride 108 (H) 98 - 107 mmol/L    Carbon Dioxide (CO2) 24 22 - 29 mmol/L    Anion Gap 9 7 - 15 mmol/L    Urea Nitrogen 20.6 8.0 - 23.0 mg/dL    Creatinine 1.27 (H) 0.51 - 0.95 mg/dL    GFR Estimate 43 (L) >60 mL/min/1.73m2    Calcium 8.8 8.8 - 10.2 mg/dL    Glucose 115 (H) 70 - 99 mg/dL   CBC with platelets   Result Value Ref Range    WBC Count 44.6 (H) 4.0 - 11.0 10e3/uL    RBC Count 4.17 3.80 - 5.20 10e6/uL    Hemoglobin 11.9 11.7 - 15.7 g/dL    Hematocrit 39.4 35.0 - 47.0 %    MCV 95 78 - 100 fL    MCH 28.5 26.5 - 33.0 pg    MCHC 30.2 (L) 31.5 - 36.5 g/dL    RDW 15.1 (H) 10.0 - 15.0 %    Platelet Count 205 150 - 450 10e3/uL         Imaging    XR Abdomen Port 1 View    Result Date: 11/2/2023  EXAM: XR ABDOMEN PORT 1 VIEW LOCATION: [facility] DATE: 11/2/2023 INDICATION: sp ng tube placement COMPARISON: None.     IMPRESSION: NG tube in good position within stomach. Multiple loops of mildly dilated small bowel are present. Contrast within bladder. Scoliosis and left hip arthroplasty.    CT Abdomen Pelvis w Contrast    Result Date: 11/1/2023  EXAM: CT ABDOMEN PELVIS W CONTRAST LOCATION: [facility] DATE: 11/1/2023 INDICATION: abdominal pain; h o lung cancer with mets COMPARISON: 09/29/2023 TECHNIQUE: CT scan of the abdomen and pelvis was performed following injection of IV contrast. Multiplanar reformats were obtained. Dose reduction techniques were  used. CONTRAST: [id number] 75ml FINDINGS: LOWER CHEST: Tiny right pleural effusion with associated right lower lobe airspace opacities. Previously noted a 0.3 cm middle lobe nodule, unchanged compared to 09/29/2023. Mild stable elevation right hemidiaphragm. Small hiatal hernia. HEPATOBILIARY: The gallbladder is distended. Clinical correlation for associated symptomatology recommended. Interval development of low attenuation in the liver adjacent to the leigh ann hepatis likely representing fatty infiltration. Scattered stable tiny cysts in the liver. No calcified gallstones. Interval increasing fluid adjacent to the anterior and right lateral liver PANCREAS: No significant mass, duct dilatation, or inflammatory change. SPLEEN: Normal. ADRENAL GLANDS: Increasing right adrenal loculated nodularity with adjacent surrounding high attenuation that extends inferiorly along the right kidney. 1.8 x 1.4 cm left adrenal stable. KIDNEYS/BLADDER: Bilateral kidneys enhance symmetrically without evidence for pyelonephritis. No significant mass, stones, or hydronephrosis. There are simple or benign cysts. No follow up is needed. BOWEL: Interval development of dilated fluid-filled loops of small bowel with transition in the pelvis. Decompressed loops of small bowel. Findings are consistent with high-grade mechanical small bowel obstruction. The etiology of the obstruction is not clearly identified. LYMPH NODES: No lymphadenopathy. VASCULATURE: No abdominal aortic aneurysm. Moderate multifocal atherosclerotic plaques and calcifications of the abdominal aorta and common iliac arteries. PELVIC ORGANS: No pelvic masses. MUSCULOSKELETAL: Left hip arthroplasty. No osteolytic or osteoblastic lesions. Mild demineralization of visualized osseous structures. Moderate to marked stable compression deformity of T12. Moderate to marked thoracolumbar spondylosis.     IMPRESSION: 1.  Interval development of dilated fluid-filled loops of small bowel  with transition in the pelvis. Decompressed loops of small bowel. Findings are consistent with high-grade mechanical small bowel obstruction. The etiology of the obstruction is not clearly identified.  2.  Increasing right adrenal loculated nodularity with adjacent surrounding high attenuation that extends inferiorly along the right kidney. 1.8 x 1.4 cm left adrenal stable. Findings based on prior reported concerning for metastatic disease. 3.  The gallbladder is distended. Clinical correlation for associated symptomatology recommended. Interval increasing fluid adjacent to the anterior and right lateral liver. 4.  Interval development of low attenuation in the liver adjacent to the leigh ann hepatis likely representing fatty infiltration. Scattered stable tiny cysts in the liver. No calcified gallstones.     US Biopsy Back Soft Tissue Superfical    Result Date: 10/27/2023  EXAM: 1. PERCUTANEOUS BIOPSY BACK 2. ULTRASOUND GUIDANCE LOCATION: [facility] DATE: 10/27/2023 INDICATION: skin nodule on chest and back. for diagnosis and molecular test. Presumed metastatic cancer. PROCEDURE: Informed consent obtained. Site marked. Prior images reviewed. Required items made available. Patient identity was confirmed verbally and with arm band. Patient reevaluated immediately before beginning the procedure. Universal protocol was followed. Time out performed. The site was prepped and draped in sterile fashion. 5 mL of 1 percent lidocaine was infused into the local soft tissues. Using standard technique and under direct ultrasound guidance, an 18 gauge biopsy needle was used to make three core biopsies. Tissue was submitted to Pathology. The patient tolerated the procedure well. No complications.     IMPRESSION: Status post US-guided biopsy right mid back lesion. Reference CPT Code: 39976, 29615    MR Brain w/o & w Contrast    Result Date: 10/27/2023  EXAM: MR BRAIN W/O and W CONTRAST LOCATION: [facility]  [facility] DATE: 10/27/2023 INDICATION: Lung cancer, hemianopsia COMPARISON: Brain MRI: 06/18/2023. CONTRAST: 5 ml Gadavist given TECHNIQUE: Routine multiplanar multisequence head MRI without and with intravenous contrast. High-resolution T1-weighted postcontrast images were also obtained. FINDINGS: INTRACRANIAL CONTENTS: Interval development of two large hemorrhagic lesions with intrinsic T1 hyperintense signal abnormality and associated susceptibility artifact in the bilateral occipital lobes. Both lesions demonstrate areas of patchy contrast enhancement. The lesion on the left measures at least 36 x 32 x 44 mm (AP by TV by cc). The lesion on the right measures approximately 24 x 25 x 22 mm. Both lesions demonstrate exuberant adjacent vasogenic edema. Restricted diffusion associated with both  lesions is favored secondary to blood products. Partial effacement of the cerebral sulci overlying both lesions, left greater than right. There is local mass effect with both lesions partially narrowing the occipital horns. No evidence of hydrocephalus.  No restricted diffusion to suggest acute infarct. No extra-axial fluid collections. Patchy and confluent nonspecific T2/FLAIR hyperintensities within the cerebral white matter most consistent with moderate chronic microvascular ischemic change. Similar remote left PCA territory infarct. Moderate to advanced generalized cerebral atrophy. Normal position of the cerebellar tonsils. Basal cisterns are patent. SELLA: No abnormality accounting for technique. OSSEOUS STRUCTURES/SOFT TISSUES: Normal marrow signal. The major intracranial vascular flow voids are maintained. ORBITS: No abnormality accounting for technique. SINUSES/MASTOIDS: Scattered paranasal sinus mucosal thickening, greatest in the sphenoid sinuses. No middle ear or mastoid effusion.     IMPRESSION: 1.  Interval development of large hemorrhagic metastatic lesions in both occipital lobes as detailed  above, left larger than right. 2.  Exuberant vasogenic edema adjacent to both occipital lobe metastases with narrowing of the occipital horns bilaterally. No evidence of hydrocephalus at this time. 3.  Additional chronic changes as described in the body of the report. [Critical Result: Interval development of large hemorrhagic metastatic lesions in both occipital lobes.] Finding was identified on 10/27/2023 12:08 PM CDT. Dr. Franz was contacted by me on 10/27/2023 12:34 PM CDT and verbalized understanding of the critical result.     XR Chest Port 1 View    Result Date: 10/25/2023  EXAM: XR CHEST PORT 1 VIEW LOCATION: [facility] DATE: 10/25/2023 INDICATION: RN placed PICC   verify tip placement COMPARISON: CT 10/25/2023, 1804 hours     IMPRESSION: Right upper extremity PICC tip in the mid SVC. Grossly unchanged emphysema, pulmonary nodules, right hilar adenopathy.    Chest CT w/o contrast    Result Date: 10/25/2023  EXAM: CT CHEST W/O CONTRAST LOCATION: [facility] DATE: 10/25/2023 INDICATION: Dyspnea, productive cough COMPARISON: 9/28/2023 TECHNIQUE: CT chest without IV contrast. Multiplanar reformats were obtained. Dose reduction techniques were used. CONTRAST: None. FINDINGS: LUNGS AND PLEURA: Slightly decreased size of the peripheral right upper lobe pulmonary nodule measuring 1.7 x 1.4 cm (5/76), previously 1.9 x 1.9 cm, prior to that 2.3 x 2.1 cm. However, there is again increased size of many other satellite nodules bilaterally. For example: -Centrally in the right upper lobe, 2.1 x 2.0 cm (5/60), previously 0.7 x 0.5 cm. -Left lower lobe subpleural nodule, 1.0 x 0.9 cm (5/71), previously 0.7 x 0.7 cm. Advanced emphysema with areas of atelectasis and scarring. Similar pleural thickening and architectural distortion in the anterior lingula. Most of the endobronchial debris has resolved compared to prior. No pleural effusion or pneumothorax.  MEDIASTINUM/AXILLAE: Increased size of mediastinal and right hilar lymph nodes compared to 9/20/2023. For example, a pretracheal lymph node now measures 4.2 x 4.1 cm (4/56), previously 2.1 x 1.5 cm. CORONARY ARTERY CALCIFICATION: Mild. UPPER ABDOMEN: Bilateral adrenal masses and right adrenal hemorrhage, better demonstrated on CT 9/29/2023. Small perinephric nodules are new compared to 9/29/2023. MUSCULOSKELETAL: No aggressive or destructive lesions. Unchanged T12 compression deformity.     IMPRESSION: 1.  Worsening metastatic disease with increased size of multiple pulmonary nodules and thoracic lymph nodes compared to 9/28/2023. 2.  Numerous retroperitoneal/perinephric nodules in the visualized upper abdomen appear to be new compared to 9/29/2023 and also worrisome for metastatic disease. Bilateral adrenal nodules and right adrenal hemorrhage were better characterized on prior contrast-enhanced CT.     XR Chest Port 1 View    Result Date: 10/25/2023  EXAM: XR CHEST PORT 1 VIEW LOCATION: [facility] DATE: 10/25/2023 INDICATION: Dyspnea, respiratory failure COMPARISON: Chest x-ray 09/28/2023 and CT chest 09/28/2023     IMPRESSION: Heart size within normal limits. Enlarged right hilum representing adenopathy as seen on CT. Pulmonary vascularity normal. Mid right lung nodule, better appreciated on CT. Subsegmental atelectasis or scarring in the bases. No acute infiltrates or effusions.     50 minutes spent on the date of the encounter doing chart review, history and exam, documentation, communication of the treatment plan with the care team and further activities as noted above.    Signed by: Devon Suarez MD

## 2023-11-02 PROBLEM — K56.609 SMALL BOWEL OBSTRUCTION (H): Status: ACTIVE | Noted: 2023-01-01

## 2023-11-02 PROBLEM — C34.90 PRIMARY MALIGNANT NEOPLASM OF LUNG METASTATIC TO OTHER SITE, UNSPECIFIED LATERALITY (H): Status: ACTIVE | Noted: 2023-01-01

## 2023-11-02 PROBLEM — N17.9 AKI (ACUTE KIDNEY INJURY) (H): Status: ACTIVE | Noted: 2023-01-01

## 2023-11-02 NOTE — ED NOTES
Changed into a gown, placed external urinary catheter, new brief applied, extra blankets given for comfort and lights dimmed.

## 2023-11-02 NOTE — ED NOTES
Report given to Shirley LUI RN  Patient is calm & resting with eyes closed, breathing is even and unlabored.

## 2023-11-02 NOTE — ED NOTES
Pt requesting to speak to daughter, called daughter in patients room. Writer gave daughter update about NG tube placement.

## 2023-11-02 NOTE — PROGRESS NOTES
[facility]    Progress Note - Hospitalist Service       Date of Admission:  11/1/2023    Assessment & Plan   Irene León is a 78 year old female admitted on 11/1/2023. 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    SBO  CT on admission showed high-grade mechanical small bowel obstruction and dilated fluid-filled loops of small bowel.  Likely secondary to known cancers.  WBC 39.7, lactic acid wnl on admission.  -General surgery consult: Appreciate recommendations and cares   -Plan to do Gastrografin study today  -NG tube low intermediate suctioning  -NPO  -PRN IV dilaudid 0.5 mg q2h  -PRN IV compazine  -AM CBC     Anxiety  Has home hydroxyzine 25-50 mg q6h PRN, but unable to take due to NPO.  Exacerbated by NG tube.  -PTA lorazepam 0.5 mg q6h PRN if hydroxyzine ineffective     Non-small cell lung carcinoma with metastatic disease including brain  Invasive ductal carcinoma of left breast  CT showing increasing right adrenal loculated nodularity with surrounding high attenuation.  Hospice meeting scheduled for 11/2/2023 outpatient with a hospice house.  Daughter Leelee stated they would reassess tomorrow 11/3 if they wanted to speak with hospice inpatient.  -Follows with Dr. Suarez with [facility] oncology     COPD  Oxygen dependent at 2-3 L at baseline.  Previously hospitalization from 10/25-10/28 for COPD exacerbation.  -Completing prednisone taper with IV methylprednisolone. Last dose of prednisone 30 mg on 11/2. Give IV methylprednisolone 24mg 11/2 and then decrease to 16 mg for 3 days. Revert to oral prednisone when able to tolerate oral medications.  -PTA Breo Ellipta 1 puff at bedtime, DuoNeb PRN, Mucomyst PRN  -Supplemental oxygen with NC  -Continuous pulse ox     HFrEF  Last echo completed on 6/22/2023 with an  ejection fraction of 20-25%, grade 3 diastolic dysfunction, decreased ventricle systolic function.  On spironolactone 25 mg daily and metoprolol succinate 50 mg at bedtime  -Restart medication once able to tolerate oral medications  -Gentle fluids, monitor closely     Atrial fibrillation  Previously on rivaroxaban.  History of hemorrhagic metastatic brain disease masses noted on previous hospitalization.  Rivaroxaban discontinued.  -PRN metoprolol 5mg for sustained tachycardia with HR>130 for 10 minutes     Leukocytosis   Secondary to steroid medications.  Patient was started on dexamethasone 1 Mg twice daily by oncology and is also on a prednisone taper, currently on 20 mg daily for 3 days then 10 mg for 3 days.  Currently holding both medications due to n.p.o. status.  -Methylprednisolone 16 mg for 3 days  -Plan to discharge on dexamethasone 1 mg twice daily     Chronic medications:  Mood: Hold PTA Lexapro 10 mg daily, IM 5 mg Zyprexa at bedtime  Allergies: Hold Flonase and cetirizine  Overactive bladder: Hold mirabegron   Hypercholesterolemia: Hold simvastatin 40 mg at bedtime         Diet: NPO for Medical/Clinical Reasons Except for: No Exceptions    DVT Prophylaxis: SCDs, no medication due to history of hemorrhagic metastatic brain masses and adrenal masses  Hayes Catheter: Not present  Fluids: IV NS 75 mL/hour  Lines: PRESENT             Cardiac Monitoring: None  Code Status: No CPR- Do NOT Intubate      Clinically Significant Risk Factors Present on Admission              # Hypoalbuminemia: Lowest albumin = 3.4 g/dL at 11/1/2023 10:17 PM, will monitor as appropriate     # Hypertension: Noted on problem list  # Chronic heart failure with reduced ejection fraction: last echo with EF <40%         # COPD: noted on problem list        Disposition Plan      Expected Discharge Date: 11/04/2023                The patient's care was discussed with the Attending Physician, Dr. Perez .    Kriss Franz MD  PGY1  Hospitalist Service  [facility]  Securely message with [employer] (more info)  Text page via [employer] Paging/Directory   ______________________________________________________________________    Interval History   Patient was laying in bed when I saw her.  She was admitted overnight.  She had several episodes overnight where she required extra pain medication or medication for anxiety.  Notably she also had 1 episode of a 6 beat run of Vtach before rhythm transitioned back to NSR.  Patient was visibly uncomfortable when I saw her and would not respond to my questions appropriately.  She would drift off to sleep.    Called daughter Leelee and updated her on the plan around 10:00 am.    Physical Exam   Vital Signs: Temp: 97.7  F (36.5  C) Temp src: Axillary BP: (!) 185/86 Pulse: 97   Resp: 15 SpO2: 94 % O2 Device: Nasal cannula Oxygen Delivery: 4 LPM  Weight: 111 lbs 0 oz    Constitutional: Asleep, mild distress, looks uncomfortable  HEENT: Normocephalic, without obvious abnormality, atraumatic, nasal cannula and NG in place  Respiratory: No increased work of breathing, diffuse inspiratory and expiratory wheezing, no crackles  Cardiovascular: Regular rate and rhythm, normal S1 and S2, and no murmur noted  GI: Hypoactive bowel sounds, soft, non-distended  Extremities: Bilateral trace edema      Data     I have personally reviewed the following data over the past 24 hrs:    44.6 (H)  \   11.9   / 205     141 108 (H) 20.6 /  115 (H)   4.3 24 1.27 (H) \     ALT: <5 AST: 28 AP: 62 TBILI: 0.2   ALB: 3.4 (L) TOT PROTEIN: 7.2 LIPASE: 60     Trop: 27 (H) BNP: N/A     Procal: N/A CRP: 15.60 (H) Lactic Acid: 1.2       INR:  1.08 PTT:  N/A   D-dimer:  N/A Fibrinogen:  N/A       Imaging results reviewed over the past 24 hrs:   Recent Results (from the past 24 hour(s))   CT Abdomen Pelvis w Contrast    Narrative    EXAM: CT ABDOMEN PELVIS W CONTRAST  LOCATION: [facility]  DATE:  11/1/2023    INDICATION: abdominal pain; h o lung cancer with mets  COMPARISON: 09/29/2023  TECHNIQUE: CT scan of the abdomen and pelvis was performed following injection of IV contrast. Multiplanar reformats were obtained. Dose reduction techniques were used.  CONTRAST: [id number] 75ml    FINDINGS:   LOWER CHEST: Tiny right pleural effusion with associated right lower lobe airspace opacities. Previously noted a 0.3 cm middle lobe nodule, unchanged compared to 09/29/2023. Mild stable elevation right hemidiaphragm. Small hiatal hernia.    HEPATOBILIARY: The gallbladder is distended. Clinical correlation for associated symptomatology recommended. Interval development of low attenuation in the liver adjacent to the leigh ann hepatis likely representing fatty infiltration. Scattered stable tiny   cysts in the liver. No calcified gallstones. Interval increasing fluid adjacent to the anterior and right lateral liver    PANCREAS: No significant mass, duct dilatation, or inflammatory change.    SPLEEN: Normal.    ADRENAL GLANDS: Increasing right adrenal loculated nodularity with adjacent surrounding high attenuation that extends inferiorly along the right kidney. 1.8 x 1.4 cm left adrenal stable.    KIDNEYS/BLADDER: Bilateral kidneys enhance symmetrically without evidence for pyelonephritis. No significant mass, stones, or hydronephrosis. There are simple or benign cysts. No follow up is needed.    BOWEL: Interval development of dilated fluid-filled loops of small bowel with transition in the pelvis. Decompressed loops of small bowel. Findings are consistent with high-grade mechanical small bowel obstruction. The etiology of the obstruction is not   clearly identified.    LYMPH NODES: No lymphadenopathy.    VASCULATURE: No abdominal aortic aneurysm. Moderate multifocal atherosclerotic plaques and calcifications of the abdominal aorta and common iliac arteries.    PELVIC ORGANS: No pelvic masses.    MUSCULOSKELETAL: Left hip  arthroplasty. No osteolytic or osteoblastic lesions. Mild demineralization of visualized osseous structures. Moderate to marked stable compression deformity of T12. Moderate to marked thoracolumbar spondylosis.      Impression    IMPRESSION:   1.  Interval development of dilated fluid-filled loops of small bowel with transition in the pelvis. Decompressed loops of small bowel. Findings are consistent with high-grade mechanical small bowel obstruction. The etiology of the obstruction is not   clearly identified.      2.  Increasing right adrenal loculated nodularity with adjacent surrounding high attenuation that extends inferiorly along the right kidney. 1.8 x 1.4 cm left adrenal stable. Findings based on prior reported concerning for metastatic disease.    3.  The gallbladder is distended. Clinical correlation for associated symptomatology recommended. Interval increasing fluid adjacent to the anterior and right lateral liver.    4.  Interval development of low attenuation in the liver adjacent to the leigh ann hepatis likely representing fatty infiltration. Scattered stable tiny cysts in the liver. No calcified gallstones.    XR Abdomen Port 1 View    Narrative    EXAM: XR ABDOMEN PORT 1 VIEW  LOCATION: [facility]  DATE: 11/2/2023    INDICATION: sp ng tube placement  COMPARISON: None.      Impression    IMPRESSION: NG tube in good position within stomach. Multiple loops of mildly dilated small bowel are present.  Contrast within bladder. Scoliosis and left hip arthroplasty.

## 2023-11-02 NOTE — TELEPHONE ENCOUNTER
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 First requesting prn liquid Dilaudid which she has taken in the past. Last dose of tramadol was 1.5 hours ago. Pain not controlled. Then began vomiting and requesting eval in ED. Facility staff did not give any VS.     ASSESSMENT/PLAN    Ok for eval in ED  Electronically signed by:   DELMIS Hatfield CNP

## 2023-11-02 NOTE — ED NOTES
Pt attempting to pull out NG at this time. Attempted to redirect the patient, patient non redirectable.

## 2023-11-02 NOTE — PHARMACY-ADMISSION MEDICATION HISTORY
Pharmacist Admission Medication History    Admission medication history is complete. The information provided in this note is only as accurate as the sources available at the time of the update.    Information Source(s): Facility ([facility]/) medication list/MAR via N/A    Pertinent Information: daughter states that oral dilaudid soln worked for pain in past, Stephanie thought it was 3mg.  Note that pt on both dexamethasone and prednisone.    Changes made to PTA medication list:  Added: none  Deleted: tamoxifen-see note from clinic to stop sotorasib and tamoxifen  Changed: None    Medication Affordability:       Allergies reviewed with patient and updates made in EHR: yes    Medication History Completed By: Joyce Roman [facility] 11/1/2023 10:48 PM    PTA Med List   Medication Sig Last Dose    acetaminophen (TYLENOL) 500 MG tablet Take 1,000 mg by mouth daily as needed for mild pain prn    acetaminophen (TYLENOL) 500 MG tablet Take 1,000 mg by mouth 3 times daily May also take 1,000mg by mouth once daily PRN 11/1/2023 at x2    acetylcysteine (MUCOMYST) 10 % nebulizer solution Inhale 4 mLs into the lungs every 4 hours as needed for mucolysis/respiratory distress prn    albuterol (PROAIR HFA/PROVENTIL HFA/VENTOLIN HFA) 108 (90 Base) MCG/ACT inhaler Inhale 2 puffs into the lungs every 6 hours as needed for shortness of breath, wheezing or cough prn    Azelastine HCl 137 MCG/SPRAY SOLN Spray 1 spray into both nostrils 2 times daily as needed for rhinitis prn    benzonatate (TESSALON PERLES) 100 MG capsule Take 1 capsule (100 mg) by mouth 3 times daily as needed for cough prn    bisacodyl (DULCOLAX) 10 MG suppository Place 10 mg rectally daily as needed for constipation prn    cetirizine (ZYRTEC) 5 MG tablet Take 1 tablet (5 mg) by mouth daily as needed for allergies prn    chlorhexidine (PERIDEX) 0.12 % solution Take 15 mLs by mouth nightly as needed prn    dexAMETHasone (DECADRON) 1 MG tablet Take 1 tablet (1 mg) by mouth  2 times daily (with meals) not started    escitalopram (LEXAPRO) 10 MG tablet Take 1 tablet (10 mg) by mouth daily 11/1/2023 at am    famotidine (PEPCID) 20 MG tablet Take 1 tablet (20 mg) by mouth every other day 11/1/2023 at am    fluticasone (FLONASE) 50 MCG/ACT nasal spray Spray 1 spray into both nostrils daily as needed for rhinitis or allergies prn    Fluticasone-Umeclidin-Vilanterol (TRELEGY ELLIPTA) 200-62.5-25 MCG/INH oral inhaler Inhale 1 puff into the lungs At Bedtime 10/31/2023 at hs does not have    hydrOXYzine (ATARAX) 25 MG tablet Take 1-2 tablets (25-50 mg) by mouth every 6 hours as needed for anxiety (adjuvant pain) prn    ipratropium (ATROVENT HFA) 17 MCG/ACT inhaler Inhale 2 puffs into the lungs every 6 hours as needed for wheezing prn    ipratropium - albuterol 0.5 mg/2.5 mg/3 mL (DUONEB) 0.5-2.5 (3) MG/3ML neb solution Take 1 vial by nebulization every 6 hours as needed for shortness of breath, wheezing or cough prn    LORazepam (ATIVAN) 0.5 MG tablet Take 1 tablet (0.5 mg) by mouth every 6 hours as needed for anxiety Give Hydroxyzine 25-50 mg prior to Lorazepam. If Hydroxyzine not effective for anxiety, give Lorazepam. prn    Melatonin 5 MG TBDP Take 15 mg by mouth At Bedtime 10/31/2023 at hs    metoprolol succinate ER (TOPROL XL) 50 MG 24 hr tablet Take 1 tablet (50 mg) by mouth At Bedtime 10/31/2023 at hs    mirabegron (MYRBETRIQ) 25 MG 24 hr tablet Take 1 tablet (25 mg) by mouth daily 11/1/2023 at am    nystatin (MYCOSTATIN) 983826 UNIT/GM external cream Apply topically 3 times daily as needed for dry skin prn    OLANZapine (ZYPREXA) 5 MG tablet Take 1 tablet (5 mg) by mouth At Bedtime 10/31/2023 at hs    predniSONE (DELTASONE) 20 MG tablet Take 2 tablets (40 mg) by mouth daily for 3 days, THEN 1.5 tablets (30 mg) daily for 3 days, THEN 1 tablet (20 mg) daily for 3 days, THEN 0.5 tablets (10 mg) daily for 3 days. 11/1/2023 at 30mg    prochlorperazine (COMPAZINE) 10 MG tablet Take 10 mg by  mouth every 6 hours as needed for nausea or vomiting prn    simvastatin (ZOCOR) 40 MG tablet Take 1 tablet (40 mg) by mouth At Bedtime 10/31/2023 at hs    sodium chloride 0.9 % neb solution Take 3 mLs by nebulization every 3 hours as needed for wheezing prn    spironolactone (ALDACTONE) 25 MG tablet Take 1 tablet (25 mg) by mouth daily 11/1/2023 at am    traMADol (ULTRAM) 50 MG tablet Take 50 mg by mouth daily as needed for severe pain prn    traMADol (ULTRAM) 50 MG tablet Take 1 tablet (50 mg) by mouth 3 times daily 11/1/2023 at x2    zinc oxide (DESITIN) 40 % external ointment Apply topically as needed for dry skin or irritation (Twice a day to gluteal area skin irritation) prn

## 2023-11-02 NOTE — ED NOTES
Patient's pain increased after returning from CT, daughter wondering if pt can have more pain medications. Patient bending legs, flexing arms, and groaning in pain. MD updated.

## 2023-11-02 NOTE — ED PROVIDER NOTES
EMERGENCY DEPARTMENT ENCOUnter      NAME: Irene León  AGE: 78 year old female  YOB: 1945  MRN: [id number]  EVALUATION DATE & TIME: 11/1/2023  9:41 PM    PCP: Paola Rose    ED PROVIDER: Verenice Wyatt MD      Chief Complaint   Patient presents with    Abdominal Pain         FINAL IMPRESSION:  1. Small bowel obstruction (H)    2. Primary malignant neoplasm of lung metastatic to other site, unspecified laterality (H)    3. ANATOLIY (acute kidney injury) (H24)    4. Leukocytosis, unspecified type          ED COURSE & MEDICAL DECISION MAKING:      In summary, the patient is a 78-year-old female who presents to the emergency department for evaluation of abdominal pain and vomiting thought secondary to a small bowel obstruction.  She also has a recent diagnosis of metastatic lung cancer.  We will admit to the hospital for further care and evaluation.    2206- I met with the patient, obtained history, performed an initial exam, and discussed options and plan for diagnostics and treatment here in the ED. Fentanyl 50 mcg IV was administered for pain.  Compazine 5 mg IV was administered for nausea.  Daughter is requesting additional pain medication.  Fentanyl 25 mcg IV was administered.  Ativan 0.2 mg IV was administered for anxiolysis.   ml/hr was initiated.  A NG tube was placed by nursing staff at and attached to low intermittent suction.  3996- Rechecked and updated patient.    Discussed case with residents' service, Dr. Lamb, and she accepts patient for admission.  Soft restraints were placed on the patient's bilateral upper extremities to keep her from pulling out her NG tube.    Medical Decision Making    History:  Supplemental history from: Documented in chart, if applicable  External Record(s) reviewed: Documented in chart, if applicable.    Work Up:  Chart documentation includes differential considered and any EKGs or imaging independently interpreted by provider, where specified.  In  additional to work up documented, I considered the following work up: Documented in chart, if applicable.    External consultation:  Discussion of management with another provider: family practice resident physician    Complicating factors:  Care impacted by chronic illness: Cancer/Chemotherapy, Chronic Kidney Disease, Heart Disease, Hyperlipidemia, and Hypertension  Care affected by social determinants of health: N/A    Disposition considerations: Admit.          At the conclusion of the encounter I discussed the results of all of the tests and the disposition. The questions were answered. The patient or family acknowledged understanding and was agreeable with the care plan.         MEDICATIONS GIVEN IN THE EMERGENCY:  Medications   fluticasone-vilanterol (BREO ELLIPTA) 200-25 MCG/ACT inhaler 1 puff (1 puff Inhalation Not Given 11/2/23 0214)     And   umeclidinium (INCRUSE ELLIPTA) 62.5 MCG/ACT inhaler 1 puff (1 puff Inhalation Not Given 11/2/23 0214)   ipratropium - albuterol 0.5 mg/2.5 mg/3 mL (DUONEB) neb solution 3 mL (has no administration in time range)   acetylcysteine (MUCOMYST) 10 % nebulizer solution 4 mL (has no administration in time range)   melatonin tablet 1 mg (has no administration in time range)   sodium chloride 0.9 % infusion ( Intravenous Rate/Dose Change 11/2/23 0139)   prochlorperazine (COMPAZINE) injection 5 mg (has no administration in time range)     Or   prochlorperazine (COMPAZINE) tablet 5 mg (has no administration in time range)     Or   prochlorperazine (COMPAZINE) suppository 12.5 mg (has no administration in time range)   LORazepam (ATIVAN) injection 0.5 mg (0.5 mg Intravenous $Given 11/2/23 0136)   OLANZapine (zyPREXA) injection 5 mg (has no administration in time range)   methylPREDNISolone sodium succinate (solu-MEDROL) injection 24 mg (has no administration in time range)   methylPREDNISolone sodium succinate (solu-MEDROL) injection 16 mg (has no administration in time range)    hydrALAZINE (APRESOLINE) injection 10 mg (has no administration in time range)   HYDROmorphone (PF) (DILAUDID) injection 0.5 mg (has no administration in time range)   metoprolol (LOPRESSOR) injection 5 mg (has no administration in time range)   iopamidol (ISOVUE-370) solution 75 mL (75 mLs Intravenous $Given 11/1/23 2313)   fentaNYL (PF) (SUBLIMAZE) injection 50 mcg (50 mcg Intravenous $Given 11/1/23 2235)   prochlorperazine (COMPAZINE) injection 5 mg (5 mg Intravenous $Given 11/1/23 2249)   fentaNYL (PF) (SUBLIMAZE) injection 25 mcg (25 mcg Intravenous $Given 11/1/23 2352)   LORazepam (ATIVAN) injection 0.2 mg (0.2 mg Intravenous $Given 11/1/23 2351)   dimenhyDRINATE (DRAMAMINE) injection 25 mg (25 mg Intravenous $Given 11/2/23 0006)   sodium chloride 0.9 % infusion (0 mLs Intravenous Stopped 11/2/23 0139)   LORazepam (ATIVAN) injection 0.2 mg (0.2 mg Intravenous Not Given 11/2/23 0119)       NEW PRESCRIPTIONS STARTED AT TODAY'S ER VISIT  New Prescriptions    No medications on file          =================================================================    HPI        Irene León is a 78 year old female with a pertinent history of CHF, HTN, hyperlipidemia, atrial fibrillation, nonischemic cardiomyopathy, GERD, COPD, CKD, breast cancer, lung cancer, depression with anxiety, who presents to this ED via EMS for evaluation of abdominal pain.  The patient has a recent diagnosis of metastatic lung cancer.  She is currently residing in a transitional care unit.  She was having severe abdominal pain today and they were not providing any pain medication.  She also had at least 2 episodes of vomiting today.  She has not had a bowel movement for a couple days.  She was brought to the emergency department by EMS and received fentanyl and Zofran in the prehospital setting.  These medications improved her pain and nausea.        Per chart review, patient was seen at [facility] on 10/25/23-10/28/23 for COPD with  acute exacerbation. Patient presented to the ED hypoxic to 80%, she required about 8 L.  Following DuoNebs, IV steroids she improved significantly overnight to her baseline.  Her respiratory status remained stable throughout the remainder of the hospitalization.  She was discharged back to her facility with a prolonged steroid taper as well as continuation of her PTA inhalers and nebulizer treatments. Patient has also been receiving palliative treatment with [facility]. CT imaging revealed progression of her known pulmonary malignancy in the setting of severe oxygen dependent COPD. CT Chest on admission showed worsening pulmonary disease. She was noted to have a right visual field deficit. The family and patient initially declined brain MRI while discussing goals of care. The did ultimately proceed with brain MRI which showed new Interval development of large hemorrhagic metastatic lesions in both occipital lobes as detailed above, left larger than right. Exuberant vasogenic edema adjacent to both occipital lobe metastases with narrowing of the occipital horns bilaterally. No evidence of hydrocephalus at this time. Findings were discussed briefly with Neurosurgery who did not recommend additional steroids or antiepileptics given the lack of seizure activity and overall prognosis. Patient's Lumakras was continued on discharge. Recommended to follow up with Palliative care and Dr. Suarez. She underwent biopsy of back mass, pathology pending at discharge. Patient discharged to TCU.    REVIEW OF SYSTEMS     Constitutional:  Denies fever or chills  HENT:  Denies sore throat   Respiratory:  Denies cough or shortness of breath   Cardiovascular:  Denies chest pain or palpitations  GI:  abdominal pain, nausea, and vomiting, constipation  Musculoskeletal:  Denies any new extremity pain   Skin:  Denies rash   Neurologic:  Denies headache, focal weakness or sensory changes    All other systems reviewed and are  negative      PAST MEDICAL HISTORY:  Past Medical History:   Diagnosis Date    ANATOLIY (acute kidney injury) (H24)     Allergic rhinitis     Arrhythmia     Atrial fibrillation with RVR (H)     Bacteremia     Breast cancer (H) 2017    Cardiomyopathy (H)     Centrilobular emphysema (H)     CHF (congestive heart failure) (H)     Chronic kidney disease     CKD (chronic kidney disease)     COPD (chronic obstructive pulmonary disease) (H)     Coronary artery disease     Depression     Dysphagia     E. coli sepsis (H)     Factor 5 Leiden mutation, heterozygous (H24)     GERD (gastroesophageal reflux disease)     Heart failure with reduced ejection fraction (H) 04/17/2023    Hx of radiation therapy 2017    Hyperlipidemia     Hypertension     Hypokalemia     Hypomagnesemia     Idiopathic cardiomyopathy (H)     Left hip pain 04/30/2014    OAB (overactive bladder)     Osteoporosis     Sacral insufficiency fracture     Sinusitis     Syncope     Urge incontinence     Vocal cord dysfunction        PAST SURGICAL HISTORY:  Past Surgical History:   Procedure Laterality Date    ARTHROPLASTY REVISION HIP Left     BIOPSY BREAST Right 2017    BLADDER SURGERY      bladder interstim with removal    CARDIAC CATHETERIZATION      CATARACT EXTRACTION Left 07/18/2017    IR ABDOMINAL AORTOGRAM  4/16/2003    IR AORTIC ARCH 4 VESSEL ANGIOGRAM  4/16/2003    IR MISCELLANEOUS PROCEDURE  4/16/2003    LUMPECTOMY BREAST Left 06/2017    x2    PICC DOUBLE LUMEN PLACEMENT  4/11/2022         PICC TRIPLE LUMEN PLACEMENT  4/7/2022         PICC TRIPLE LUMEN PLACEMENT  10/25/2023    ZZC OPEN TX FEMORAL FRACTURE DISTAL MED/LAT CONDYLE Left 10/28/2015    Procedure: OPEN REDUCTION INTERNAL FIXATION LEFT  PROXIMAL FEMUR PERIPROSTHETIC FRACTURE;  Surgeon: Yovanny Albarran MD;  Location: [facility];  Service: Orthopedics           CURRENT MEDICATIONS:    acetaminophen (TYLENOL) 500 MG tablet  acetaminophen (TYLENOL) 500 MG tablet  acetylcysteine (MUCOMYST) 10 %  nebulizer solution  albuterol (PROAIR HFA/PROVENTIL HFA/VENTOLIN HFA) 108 (90 Base) MCG/ACT inhaler  Azelastine HCl 137 MCG/SPRAY SOLN  benzonatate (TESSALON PERLES) 100 MG capsule  bisacodyl (DULCOLAX) 10 MG suppository  cetirizine (ZYRTEC) 5 MG tablet  chlorhexidine (PERIDEX) 0.12 % solution  dexAMETHasone (DECADRON) 1 MG tablet  escitalopram (LEXAPRO) 10 MG tablet  famotidine (PEPCID) 20 MG tablet  fluticasone (FLONASE) 50 MCG/ACT nasal spray  Fluticasone-Umeclidin-Vilanterol (TRELEGY ELLIPTA) 200-62.5-25 MCG/INH oral inhaler  hydrOXYzine (ATARAX) 25 MG tablet  ipratropium (ATROVENT HFA) 17 MCG/ACT inhaler  ipratropium - albuterol 0.5 mg/2.5 mg/3 mL (DUONEB) 0.5-2.5 (3) MG/3ML neb solution  LORazepam (ATIVAN) 0.5 MG tablet  Melatonin 5 MG TBDP  metoprolol succinate ER (TOPROL XL) 50 MG 24 hr tablet  mirabegron (MYRBETRIQ) 25 MG 24 hr tablet  nystatin (MYCOSTATIN) 101555 UNIT/GM external cream  OLANZapine (ZYPREXA) 5 MG tablet  predniSONE (DELTASONE) 20 MG tablet  prochlorperazine (COMPAZINE) 10 MG tablet  simvastatin (ZOCOR) 40 MG tablet  sodium chloride 0.9 % neb solution  spironolactone (ALDACTONE) 25 MG tablet  traMADol (ULTRAM) 50 MG tablet  traMADol (ULTRAM) 50 MG tablet  zinc oxide (DESITIN) 40 % external ointment        ALLERGIES:  Allergies   Allergen Reactions    Sulfa (Sulfonamide Antibiotics) [Sulfa Antibiotics] Rash     Headaches and dizziness.    Homeopathic Drugs [External Allergen Needs Reconciliation - See Comment] Unknown     runny nose    Mold Extracts [Molds & Smuts] Unknown    Morphine (Pf) [Morphine] Unknown     hallucinate    Lisinopril Itching, Cough and Unknown     cough    Sulfacetamide Sodium [Sulfacetamide] Rash       FAMILY HISTORY:  Family History   Problem Relation Age of Onset    Osteoporosis Other     Prostate Cancer Brother     Breast Cancer Maternal Aunt         age thought to be in her 70's-80's    Prostate Cancer Maternal Uncle        SOCIAL HISTORY:   Social History      Socioeconomic History    Marital status:      Spouse name: None    Number of children: None    Years of education: None    Highest education level: None   Tobacco Use    Smoking status: Former     Types: Cigarettes     Quit date: 10/28/2007     Years since quittin.0     Passive exposure: Past    Smokeless tobacco: Former     Quit date: 2004   Vaping Use    Vaping Use: Former   Substance and Sexual Activity    Alcohol use: No    Drug use: No   Social History Narrative     and lives in home with 3 flight of steps       VITALS:  Patient Vitals for the past 24 hrs:   BP Temp Temp src Pulse Resp SpO2 Weight   23 0126 -- -- -- 92 22 94 % --   23 2344 -- -- -- 98 17 91 % --   23 2254 -- -- -- 86 18 94 % --   23 2242 -- -- -- 84 -- 95 % --   23 2150 -- -- -- -- -- -- 50.3 kg (111 lb)   23 2143 (!) 137/94 98  F (36.7  C) Temporal 83 18 90 % --       PHYSICAL EXAM    Constitutional:  Well developed, Well nourished,  HENT:  Normocephalic, Atraumatic, Bilateral external ears normal, Oropharynx moist, Nose normal.   Neck:  Normal range of motion, No meningismus, No stridor.   Eyes:  EOMI, Conjunctiva normal, No discharge.   Respiratory:  Normal breath sounds, No respiratory distress, No wheezing, No chest tenderness.   Cardiovascular:  Normal heart rate, Normal rhythm, No murmurs  GI:  mild distension, diffuse mild tenderness, No guarding, No CVA tenderness.   Musculoskeletal:  Neurovascularly intact distally, No edema, No tenderness, No cyanosis, Good range of motion in all major joints.   Integument:  Warm, Dry, No erythema, No rash.   Lymphatic:  No lymphadenopathy noted.   Neurologic:  drowsy, Normal motor function, No focal deficits noted.   Psychiatric:  Affect normal, Judgment normal, Mood normal.      LAB:  All pertinent labs reviewed and interpreted.  Results for orders placed or performed during the hospital encounter of 23   CT Abdomen Pelvis w  Contrast    Impression    IMPRESSION:   1.  Interval development of dilated fluid-filled loops of small bowel with transition in the pelvis. Decompressed loops of small bowel. Findings are consistent with high-grade mechanical small bowel obstruction. The etiology of the obstruction is not   clearly identified.      2.  Increasing right adrenal loculated nodularity with adjacent surrounding high attenuation that extends inferiorly along the right kidney. 1.8 x 1.4 cm left adrenal stable. Findings based on prior reported concerning for metastatic disease.    3.  The gallbladder is distended. Clinical correlation for associated symptomatology recommended. Interval increasing fluid adjacent to the anterior and right lateral liver.    4.  Interval development of low attenuation in the liver adjacent to the leigh ann hepatis likely representing fatty infiltration. Scattered stable tiny cysts in the liver. No calcified gallstones.    XR Abdomen Port 1 View    Impression    IMPRESSION: NG tube in good position within stomach. Multiple loops of mildly dilated small bowel are present.  Contrast within bladder. Scoliosis and left hip arthroplasty.   Result Value Ref Range    INR 1.08 0.85 - 1.15   Comprehensive metabolic panel   Result Value Ref Range    Sodium 142 135 - 145 mmol/L    Potassium 4.6 3.4 - 5.3 mmol/L    Carbon Dioxide (CO2) 25 22 - 29 mmol/L    Anion Gap 10 7 - 15 mmol/L    Urea Nitrogen 18.8 8.0 - 23.0 mg/dL    Creatinine 1.34 (H) 0.51 - 0.95 mg/dL    GFR Estimate 40 (L) >60 mL/min/1.73m2    Calcium 9.6 8.8 - 10.2 mg/dL    Chloride 107 98 - 107 mmol/L    Glucose 93 70 - 99 mg/dL    Alkaline Phosphatase 62 35 - 104 U/L    AST 28 0 - 45 U/L    ALT <5 0 - 50 U/L    Protein Total 7.2 6.4 - 8.3 g/dL    Albumin 3.4 (L) 3.5 - 5.2 g/dL    Bilirubin Total 0.2 <=1.2 mg/dL   Result Value Ref Range    Lipase 60 13 - 60 U/L   Lactic acid whole blood   Result Value Ref Range    Lactic Acid 1.2 0.7 - 2.0 mmol/L   Result Value  Ref Range    CRP Inflammation 15.60 (H) <5.00 mg/L   UA with Microscopic reflex to Culture    Specimen: Urine, NOS   Result Value Ref Range    Color Urine Light Yellow Colorless, Straw, Light Yellow, Yellow    Appearance Urine Clear Clear    Glucose Urine Negative Negative mg/dL    Bilirubin Urine Negative Negative    Ketones Urine Negative Negative mg/dL    Specific Gravity Urine 1.025 1.001 - 1.030    Blood Urine Negative Negative    pH Urine 6.0 5.0 - 7.0    Protein Albumin Urine 10 (A) Negative mg/dL    Urobilinogen Urine <2.0 <2.0 mg/dL    Nitrite Urine Negative Negative    Leukocyte Esterase Urine Negative Negative    RBC Urine 1 <=2 /HPF    WBC Urine 2 <=5 /HPF    Squamous Epithelials Urine 1 <=1 /HPF   CBC with platelets and differential   Result Value Ref Range    WBC Count 39.7 (H) 4.0 - 11.0 10e3/uL    RBC Count 4.24 3.80 - 5.20 10e6/uL    Hemoglobin 12.0 11.7 - 15.7 g/dL    Hematocrit 40.4 35.0 - 47.0 %    MCV 95 78 - 100 fL    MCH 28.3 26.5 - 33.0 pg    MCHC 29.7 (L) 31.5 - 36.5 g/dL    RDW 15.1 (H) 10.0 - 15.0 %    Platelet Count 210 150 - 450 10e3/uL    % Neutrophils      % Lymphocytes      % Monocytes      % Eosinophils      % Basophils      % Immature Granulocytes      NRBCs per 100 WBC 0 <1 /100    Absolute Neutrophils      Absolute Lymphocytes      Absolute Monocytes      Absolute Eosinophils      Absolute Basophils      Absolute Immature Granulocytes      Absolute NRBCs 0.0 10e3/uL   Asymptomatic COVID-19 Virus (Coronavirus) by PCR Nasopharyngeal    Specimen: Nasopharyngeal; Swab   Result Value Ref Range    SARS CoV2 PCR Negative Negative   Manual Differential   Result Value Ref Range    % Neutrophils 81 %    % Lymphocytes 4 %    % Monocytes 12 %    % Eosinophils 1 %    % Basophils 0 %    % Myelocytes 2 %    Absolute Neutrophils 32.2 (H) 1.6 - 8.3 10e3/uL    Absolute Lymphocytes 1.6 0.8 - 5.3 10e3/uL    Absolute Monocytes 4.8 (H) 0.0 - 1.3 10e3/uL    Absolute Eosinophils 0.4 0.0 - 0.7 10e3/uL     Absolute Basophils 0.0 0.0 - 0.2 10e3/uL    Absolute Myelocytes 0.8 (H) <=0.0 10e3/uL    RBC Morphology Confirmed RBC Indices     Platelet Assessment  Automated Count Confirmed. Platelet morphology is normal.     Automated Count Confirmed. Platelet morphology is normal.       RADIOLOGY:  I have independently reviewed and interpreted the above imaging, pending the final radiology read.  XR Abdomen Port 1 View   Final Result   IMPRESSION: NG tube in good position within stomach. Multiple loops of mildly dilated small bowel are present.   Contrast within bladder. Scoliosis and left hip arthroplasty.      CT Abdomen Pelvis w Contrast   Final Result   IMPRESSION:    1.  Interval development of dilated fluid-filled loops of small bowel with transition in the pelvis. Decompressed loops of small bowel. Findings are consistent with high-grade mechanical small bowel obstruction. The etiology of the obstruction is not    clearly identified.        2.  Increasing right adrenal loculated nodularity with adjacent surrounding high attenuation that extends inferiorly along the right kidney. 1.8 x 1.4 cm left adrenal stable. Findings based on prior reported concerning for metastatic disease.      3.  The gallbladder is distended. Clinical correlation for associated symptomatology recommended. Interval increasing fluid adjacent to the anterior and right lateral liver.      4.  Interval development of low attenuation in the liver adjacent to the leigh ann hepatis likely representing fatty infiltration. Scattered stable tiny cysts in the liver. No calcified gallstones.           EK-rate is 77, sinus, LAD, 1 mm ST depression noted in V5 and V6, prolonged QT.  I have independently reviewed and interpreted this EKG          I, Alia Bates, am serving as a scribe to document services personally performed by Dr. Wyatt based on my observation and the provider's statements to me. I, Verenice Wyatt MD attest that Alia  Jana is acting in a scribe capacity, has observed my performance of the services and has documented them in accordance with my direction.    Verenice Wyatt MD  Emergency Medicine  [facility] EMERGENCY ROOM  [address] [id number]  [phone]  Dept: [phone]     Verenice Wyatt MD  11/02/23 0229

## 2023-11-02 NOTE — ED NOTES
"Pt starting to get restless again, yelling \"help\" and attempting to get out of bed. Pain medication administered and repositioned for comfort  "

## 2023-11-02 NOTE — ED NOTES
Patient reports relief of pain after pain medication given by EMS, still reporting nausea but better after zofran administration.

## 2023-11-02 NOTE — CONSULTS
Care Management Initial Consult    General Information  Assessment completed with: Alverto Herrera  Type of CM/SW Visit: Initial Assessment  Primary Care Provider verified and updated as needed: Yes   Readmission within the last 30 days: other (see comments) (Progression of disease)   Return Category: Progression of disease  Reason for Consult: discharge planning, health care directive, transportation, end of life/hospice  Advance Care Planning: Advance Care Planning Reviewed: present on chart, no concerns identified        Communication Assessment  Patient's communication style: spoken language (English or Bilingual)        Cognitive  Cognitive/Neuro/Behavioral: WDL except, level of consciousness, arousability    Level of Consciousness: confused, lethargic    Arousal Level: arouses to touch/gentle shaking    Orientation:  (forgetful)             Living Environment:   People in home: other (see comments) (Pt arrived from TCU)     Current living Arrangements: other (see comments) (Currently here from a TCU)  Name of Facility: [facility]   Able to return to prior arrangements: other (see comments) (TBD further)     Family/Social Support:  Care provided by: child(rhonda), other (see comments), self (TCU staff assist as needed)  Provides care for: no one, unable/limited ability to care for self  Marital Status:   Children, Facility resident(s)/Staff          Description of Support System: Supportive, Involved (As needed)    Support Assessment: Adequate family and caregiver support, Adequate social supports (When at baseline within a TCU)    Current Resources:   Patient receiving home care services: No  Community Resources: Transitional Care  Equipment currently used at home: walker, rolling, cane, straight, wheelchair, manual, grab bar, tub/shower  Supplies currently used at home: Oxygen Tubing/Supplies    Employment/Financial:  Employment Status: retired     Financial Concerns: none   Referral  to Financial Worker: No     Does the patient's insurance plan have a 3 day qualifying hospital stay waiver?  Yes     Which insurance plan 3 day waiver is available? [employer]    Will the waiver be used for post-acute placement? No    Lifestyle & Psychosocial Needs:  Social Determinants of Health     Food Insecurity: Not on file   Depression: At risk (7/12/2023)    PHQ-2     PHQ-2 Score: 5   Housing Stability: Not on file   Tobacco Use: Medium Risk (11/2/2023)    Patient History     Smoking Tobacco Use: Former     Smokeless Tobacco Use: Former     Passive Exposure: Past   Financial Resource Strain: Not on file   Alcohol Use: Not on file   Transportation Needs: Not on file   Physical Activity: Not on file   Interpersonal Safety: Not on file   Stress: Not on file   Social Connections: Not on file     Functional Status:  Prior to admission patient needed assistance:   Dependent ADLs:: Ambulation-walker, Bathing, Grooming, Dressing (When at baseline)  Dependent IADLs:: Cleaning, Cooking, Laundry, Shopping, Meal Preparation, Medication Management, Money Management, Transportation (TCU staff assist as needed)  Assessment of Functional Status: Not at  functional baseline    Mental Health Status:  Mental Health Status: No Current Concerns       Chemical Dependency Status:  Chemical Dependency Status: No Current Concerns           Values/Beliefs:  Spiritual, Cultural Beliefs, Hindu Practices, Values that affect care: no (None identified)             Additional Information:  Writer spoke with pt's daughter Leelee by phone to introduce Care Management(CM), obtain an initial assessment, and discuss discharge. Pt currently resides at the [facility] TCU([facility]). Facility staff and pt's daughter assist as needed with I/ADL. Leelee states she is hopeful for resolution of SBO and return to a TCU setting. Leelee is also considering Inpatient/in-home Hospice services. Leelee recently toured [facility] Inpatient  "Hospice facility. Leelee was pleased with the facility yet, was unhappy about the distance between there and all the family. Leelee is waiting to get a final determination of how pt will stand medically when SBO is possibly resolved. Leelee would choose inpt placement over home hospice if pt's mobility, pain control, and associated cognition does not improve. Leelee would be happy to take pt home for her last bit of life but, cannot see doing so with pt having so much pain and needing frequent narcotic medications provided.     Pt/family was provided with the Medicare Compare list for Home Care and Hospice.  Discussed associated Medicare star ratings to assist with choice for referrals/discharge planning Yes. Education was given to pt/family that star ratings are updated/maintained by Medicare and can be reviewed by visiting www.medicare.gov Yes - Daughter declined as she doesn't want to discuss it until she knows more about pt recovery.    11/2 per BLANK Doll-\"78 year old female with PMH of metastatic non-small cell lung adenocarcinoma, HFrEF, COPD on home O2, stage IV CKD, a-fib who presented to the [facility] Emergency Department with abdominal pain, nausea and vomiting x a few days. Patient was not the best historian. Other history obtained via EMR. Patient reports development of abdominal pain a few days ago, she cannot tell me the exact onset of her pain. Also then developed associated nausea and vomiting. Uncertain when her last BM was. Fairly confident that she pass flatus the day prior to admission. Patient had been at a TCU when her abdominal pain started. Per chart review, the TCU was not able to manage her abdominal pain so she was transferred to the ED for further evaluation. Patient does not recall having any similar abdominal pain in the past. Not able to indicate anything that exacerbated or alleviated her pain.\"    No pending consults. CM to follow for direction towards service coordination needs. " Transport based on pt ability to transfer at time of discharge.    Mario Cantu, RN

## 2023-11-02 NOTE — ED NOTES
Checked the patient's brief and it is dry. Pure wick in place on continuous suction. Patient requesting more pain medication, reporting generalized abdominal pain.

## 2023-11-02 NOTE — ED TRIAGE NOTES
Patient here by [facility], coming from TCU. Reports abdominal pain and distention that started this evening. Patient has a history of lung cancer with mets, is going on hospice tomorrow. Was given 8mg zofran and 50mcf fentanyl per EMS.     Triage Assessment (Adult)       Row Name 11/01/23 8499          Triage Assessment    Airway WDL WDL  patient is chronically on 4LPM nasal canula        Respiratory WDL    Respiratory WDL WDL        Skin Circulation/Temperature WDL    Skin Circulation/Temperature WDL WDL        Cardiac WDL    Cardiac WDL WDL        Peripheral/Neurovascular WDL    Peripheral Neurovascular WDL WDL        Cognitive/Neuro/Behavioral WDL    Cognitive/Neuro/Behavioral WDL WDL

## 2023-11-02 NOTE — CONSULTS
General Surgery Consultation  Irene León MRN# [id number]   Age/Sex: 78 year old female YOB: 1945     Reason for consult: 1. Small bowel obstruction (H)    2. Primary malignant neoplasm of lung metastatic to other site, unspecified laterality (H)    3. ANATOLIY (acute kidney injury) (H24)    4. Leukocytosis, unspecified type            Requesting physician: Dr. Kaylee Lamb                   Assessment and Plan:   Assessment:  Abdominal pain, nausea, vomiting  SBO  Metastatic non-small cell lung adenocarcinoma    Ms. León is a 77 yo F with PMH of metastatic non-small cell lung adenocarcinoma, HFrEF, COPD on home O2, stage IV CKD, a-fib who was admitted with a SBO. Afebrile and hypertensive on admission. Labs notable for leukocytosis (WBC 44.6 < 39.7), lactic acid normal (1.2), lipase 60, Cr (1.27 < 1.34). CT A/P obtained and revealed dilated fluid-filled loops of small bowel with transition point in the pelvis. Patient would not be a good surgical candidate and would recommend conservative management with NG tube decompression and Gastrografin challenge for hopeful therapeutic effect. Would also recommend palliative care consult for symptom management.    Plan:  - NPO with NG tube  - Gastrografin challenge via NG tube  - No indication for acute surgical intervention  - Medical management per primary team  - Consider palliative care consult for pain and symptom management  - General surgery will continue to follow          Chief Complaint:     Chief Complaint   Patient presents with    Abdominal Pain        History is obtained from the patient and EMR    HPI:   Irene León is a 78 year old female with PMH of metastatic non-small cell lung adenocarcinoma, HFrEF, COPD on home O2, stage IV CKD, a-fib who presented to the [facility] Emergency Department with abdominal pain, nausea and vomiting x a few days. Patient was not the best historian. Other history obtained via EMR. Patient reports development  of abdominal pain a few days ago, she cannot tell me the exact onset of her pain. Also then developed associated nausea and vomiting. Uncertain when her last BM was. Fairly confident that she pass flatus the day prior to admission. Patient had been at a TCU when her abdominal pain started. Per chart review, the TCU was not able to manage her abdominal pain so she was transferred to the ED for further evaluation. Patient does not recall having any similar abdominal pain in the past. Not able to indicate anything that exacerbated or alleviated her pain. Denies fever, chills. Also endorses abdominal distention. No further emesis since arrival to the ED. Denies any past abdominal surgical history.           Past Medical History:     Past Medical History:   Diagnosis Date    ANATOLIY (acute kidney injury) (H24)     Allergic rhinitis     Arrhythmia     Atrial fibrillation with RVR (H)     Bacteremia     Breast cancer (H) 2017    Cardiomyopathy (H)     Centrilobular emphysema (H)     CHF (congestive heart failure) (H)     Chronic kidney disease     CKD (chronic kidney disease)     COPD (chronic obstructive pulmonary disease) (H)     Coronary artery disease     Depression     Dysphagia     E. coli sepsis (H)     Factor 5 Leiden mutation, heterozygous (H24)     GERD (gastroesophageal reflux disease)     Heart failure with reduced ejection fraction (H) 04/17/2023    Hx of radiation therapy 2017    Hyperlipidemia     Hypertension     Hypokalemia     Hypomagnesemia     Idiopathic cardiomyopathy (H)     Left hip pain 04/30/2014    OAB (overactive bladder)     Osteoporosis     Sacral insufficiency fracture     Sinusitis     Syncope     Urge incontinence     Vocal cord dysfunction               Past Surgical History:     Past Surgical History:   Procedure Laterality Date    ARTHROPLASTY REVISION HIP Left     BIOPSY BREAST Right 2017    BLADDER SURGERY      bladder interstim with removal    CARDIAC CATHETERIZATION      CATARACT  EXTRACTION Left 07/18/2017    IR ABDOMINAL AORTOGRAM  4/16/2003    IR AORTIC ARCH 4 VESSEL ANGIOGRAM  4/16/2003    IR MISCELLANEOUS PROCEDURE  4/16/2003    LUMPECTOMY BREAST Left 06/2017    x2    PICC DOUBLE LUMEN PLACEMENT  4/11/2022         PICC TRIPLE LUMEN PLACEMENT  4/7/2022         PICC TRIPLE LUMEN PLACEMENT  10/25/2023    ZZC OPEN TX FEMORAL FRACTURE DISTAL MED/LAT CONDYLE Left 10/28/2015    Procedure: OPEN REDUCTION INTERNAL FIXATION LEFT  PROXIMAL FEMUR PERIPROSTHETIC FRACTURE;  Surgeon: Yovanny Albarran MD;  Location: [facility];  Service: Orthopedics             Social History:    reports that she quit smoking about 16 years ago. Her smoking use included cigarettes. She has been exposed to tobacco smoke. She quit smokeless tobacco use about 19 years ago. She reports that she does not drink alcohol and does not use drugs.           Family History:     Family History   Problem Relation Age of Onset    Osteoporosis Other     Prostate Cancer Brother     Breast Cancer Maternal Aunt         age thought to be in her 70's-80's    Prostate Cancer Maternal Uncle               Allergies:     Allergies   Allergen Reactions    Sulfa (Sulfonamide Antibiotics) [Sulfa Antibiotics] Rash     Headaches and dizziness.    Homeopathic Drugs [External Allergen Needs Reconciliation - See Comment] Unknown     runny nose    Mold Extracts [Molds & Smuts] Unknown    Morphine (Pf) [Morphine] Unknown     hallucinate    Lisinopril Itching, Cough and Unknown     cough    Sulfacetamide Sodium [Sulfacetamide] Rash              Medications:     Prior to Admission medications    Medication Sig Start Date End Date Taking? Authorizing Provider   acetaminophen (TYLENOL) 500 MG tablet Take 1,000 mg by mouth daily as needed for mild pain   Yes Unknown, Entered By History   acetaminophen (TYLENOL) 500 MG tablet Take 1,000 mg by mouth 3 times daily May also take 1,000mg by mouth once daily PRN 3/31/21  Yes Provider, Historical    acetylcysteine (MUCOMYST) 10 % nebulizer solution Inhale 4 mLs into the lungs every 4 hours as needed for mucolysis/respiratory distress   Yes Unknown, Entered By History   albuterol (PROAIR HFA/PROVENTIL HFA/VENTOLIN HFA) 108 (90 Base) MCG/ACT inhaler Inhale 2 puffs into the lungs every 6 hours as needed for shortness of breath, wheezing or cough   Yes Unknown, Entered By History   Azelastine HCl 137 MCG/SPRAY SOLN Spray 1 spray into both nostrils 2 times daily as needed for rhinitis   Yes Unknown, Entered By History   benzonatate (TESSALON PERLES) 100 MG capsule Take 1 capsule (100 mg) by mouth 3 times daily as needed for cough 6/29/23  Yes Tobi Miguel MD   bisacodyl (DULCOLAX) 10 MG suppository Place 10 mg rectally daily as needed for constipation   Yes Unknown, Entered By History   cetirizine (ZYRTEC) 5 MG tablet Take 1 tablet (5 mg) by mouth daily as needed for allergies 9/19/23  Yes Dereje Isaac MD   chlorhexidine (PERIDEX) 0.12 % solution Take 15 mLs by mouth nightly as needed 3/15/17  Yes Provider, Historical   dexAMETHasone (DECADRON) 1 MG tablet Take 1 tablet (1 mg) by mouth 2 times daily (with meals) 11/1/23  Yes Devon Suarez MD   escitalopram (LEXAPRO) 10 MG tablet Take 1 tablet (10 mg) by mouth daily 8/2/23  Yes Pankaj Montanez MD   famotidine (PEPCID) 20 MG tablet Take 1 tablet (20 mg) by mouth every other day 9/19/23  Yes Dereje Isaac MD   fluticasone (FLONASE) 50 MCG/ACT nasal spray Spray 1 spray into both nostrils daily as needed for rhinitis or allergies   Yes Unknown, Entered By History   Fluticasone-Umeclidin-Vilanterol (TRELEGY ELLIPTA) 200-62.5-25 MCG/INH oral inhaler Inhale 1 puff into the lungs At Bedtime   Yes Unknown, Entered By History   hydrOXYzine (ATARAX) 25 MG tablet Take 1-2 tablets (25-50 mg) by mouth every 6 hours as needed for anxiety (adjuvant pain) 10/28/23  Yes Irene Lombardo MD   ipratropium (ATROVENT HFA) 17 MCG/ACT inhaler Inhale 2 puffs into the lungs  every 6 hours as needed for wheezing   Yes Unknown, Entered By History   ipratropium - albuterol 0.5 mg/2.5 mg/3 mL (DUONEB) 0.5-2.5 (3) MG/3ML neb solution Take 1 vial by nebulization every 6 hours as needed for shortness of breath, wheezing or cough   Yes Unknown, Entered By History   LORazepam (ATIVAN) 0.5 MG tablet Take 1 tablet (0.5 mg) by mouth every 6 hours as needed for anxiety Give Hydroxyzine 25-50 mg prior to Lorazepam. If Hydroxyzine not effective for anxiety, give Lorazepam. 10/28/23  Yes Irene Lombardo MD   Melatonin 5 MG TBDP Take 15 mg by mouth At Bedtime   Yes Unknown, Entered By History   metoprolol succinate ER (TOPROL XL) 50 MG 24 hr tablet Take 1 tablet (50 mg) by mouth At Bedtime 9/28/23  Yes Maria C Starr MD   mirabegron (MYRBETRIQ) 25 MG 24 hr tablet Take 1 tablet (25 mg) by mouth daily 10/17/23  Yes Pankaj Montanez MD   nystatin (MYCOSTATIN) 934815 UNIT/GM external cream Apply topically 3 times daily as needed for dry skin   Yes Unknown, Entered By History   OLANZapine (ZYPREXA) 5 MG tablet Take 1 tablet (5 mg) by mouth At Bedtime 8/15/23  Yes Tobi Miguel MD   predniSONE (DELTASONE) 20 MG tablet Take 2 tablets (40 mg) by mouth daily for 3 days, THEN 1.5 tablets (30 mg) daily for 3 days, THEN 1 tablet (20 mg) daily for 3 days, THEN 0.5 tablets (10 mg) daily for 3 days. 10/29/23 11/10/23 Yes Irene Lombardo MD   prochlorperazine (COMPAZINE) 10 MG tablet Take 10 mg by mouth every 6 hours as needed for nausea or vomiting   Yes Unknown, Entered By History   simvastatin (ZOCOR) 40 MG tablet Take 1 tablet (40 mg) by mouth At Bedtime 6/1/23  Yes Pankaj Montanez MD   sodium chloride 0.9 % neb solution Take 3 mLs by nebulization every 3 hours as needed for wheezing   Yes Unknown, Entered By History   spironolactone (ALDACTONE) 25 MG tablet Take 1 tablet (25 mg) by mouth daily 9/19/23  Yes Derjee Isaac MD   traMADol (ULTRAM) 50 MG tablet Take 50 mg by mouth daily as needed  for severe pain   Yes Unknown, Entered By History   traMADol (ULTRAM) 50 MG tablet Take 1 tablet (50 mg) by mouth 3 times daily 10/31/23  Yes Rayna Carbajal NP   zinc oxide (DESITIN) 40 % external ointment Apply topically as needed for dry skin or irritation (Twice a day to gluteal area skin irritation) 7/12/23  Yes Pankaj Montanez MD              Review of Systems:   The Review of Systems is negative other than noted in the HPI            Physical Exam:   Patient Vitals for the past 24 hrs:   BP Temp Temp src Pulse Resp SpO2 Weight   11/02/23 0804 (!) 185/86 97.7  F (36.5  C) Axillary 97 15 94 % --   11/02/23 0655 -- -- -- 92 17 96 % --   11/02/23 0645 -- -- -- 92 -- 96 % --   11/02/23 0530 -- -- -- 92 -- 96 % --   11/02/23 0315 130/79 -- -- -- -- -- --   11/02/23 0314 -- -- -- 97 21 93 % --   11/02/23 0126 -- -- -- 92 22 94 % --   11/01/23 2344 -- -- -- 98 17 91 % --   11/01/23 2254 -- -- -- 86 18 94 % --   11/01/23 2242 -- -- -- 84 -- 95 % --   11/01/23 2150 -- -- -- -- -- -- 50.3 kg (111 lb)   11/01/23 2143 (!) 137/94 98  F (36.7  C) Temporal 83 18 90 % --          Intake/Output Summary (Last 24 hours) at 11/2/2023 0927  Last data filed at 11/2/2023 0756  Gross per 24 hour   Intake 250 ml   Output 800 ml   Net -550 ml      Constitutional:   Somewhat somnolent, cooperative, no apparent distress, and appears stated age. Oriented x3.       Eyes:   PERRL, conjunctiva/corneas clear, EOM's intact; no scleral edema or icterus noted        ENT:   Normocephalic, without obvious abnormality, atraumatic, Lips, mucosa, and tongue normal. NG tube in place with scant yellow/brown output in canister.       Lungs:   Normal respiratory effort, no accessory muscle use. Breathing comfortably on 4L via nasal canula       Abdomen:   Softly distended and tender to palpation over the suprapubic and RLQ regions. No rebound or guarding.       Musculoskeletal:   No obvious swelling, bruising or deformity       Skin:   Skin color  and texture normal for patient, no rashes or lesions              Data:         All imaging studies reviewed by me.    Results for orders placed or performed during the hospital encounter of 11/01/23 (from the past 24 hour(s))   ECG 12-LEAD WITH MUSE (LHE)   Result Value Ref Range    Systolic Blood Pressure 134 mmHg    Diastolic Blood Pressure 95 mmHg    Ventricular Rate 77 BPM    Atrial Rate 77 BPM    KY Interval 126 ms    QRS Duration 116 ms     ms    QTc 482 ms    P Axis 79 degrees    R AXIS -58 degrees    T Axis 107 degrees    Interpretation ECG       Sinus rhythm  Left axis deviation  ST & T wave abnormality, consider lateral ischemia  Prolonged QT  Abnormal ECG  When compared with ECG of 25-OCT-2023 16:48,  KY interval has decreased  ST now depressed in Inferior leads  T wave inversion more evident in Lateral leads  Confirmed by SEE ED PROVIDER NOTE FOR, ECG INTERPRETATION ([id number]),  Michael Zamorano ([id number]) on 11/2/2023 5:26:28 AM     CBC with platelets differential    Narrative    The following orders were created for panel order CBC with platelets differential.  Procedure                               Abnormality         Status                     ---------                               -----------         ------                     CBC with platelets and d...[[id number]]  Abnormal            Final result               Manual Differential[[id number]]          Abnormal            Final result                 Please view results for these tests on the individual orders.   INR   Result Value Ref Range    INR 1.08 0.85 - 1.15   Comprehensive metabolic panel   Result Value Ref Range    Sodium 142 135 - 145 mmol/L    Potassium 4.6 3.4 - 5.3 mmol/L    Carbon Dioxide (CO2) 25 22 - 29 mmol/L    Anion Gap 10 7 - 15 mmol/L    Urea Nitrogen 18.8 8.0 - 23.0 mg/dL    Creatinine 1.34 (H) 0.51 - 0.95 mg/dL    GFR Estimate 40 (L) >60 mL/min/1.73m2    Calcium 9.6 8.8 - 10.2 mg/dL    Chloride 107 98 - 107 mmol/L     Glucose 93 70 - 99 mg/dL    Alkaline Phosphatase 62 35 - 104 U/L    AST 28 0 - 45 U/L    ALT <5 0 - 50 U/L    Protein Total 7.2 6.4 - 8.3 g/dL    Albumin 3.4 (L) 3.5 - 5.2 g/dL    Bilirubin Total 0.2 <=1.2 mg/dL   Lipase   Result Value Ref Range    Lipase 60 13 - 60 U/L   Lactic acid whole blood   Result Value Ref Range    Lactic Acid 1.2 0.7 - 2.0 mmol/L   CRP inflammation   Result Value Ref Range    CRP Inflammation 15.60 (H) <5.00 mg/L   CBC with platelets and differential   Result Value Ref Range    WBC Count 39.7 (H) 4.0 - 11.0 10e3/uL    RBC Count 4.24 3.80 - 5.20 10e6/uL    Hemoglobin 12.0 11.7 - 15.7 g/dL    Hematocrit 40.4 35.0 - 47.0 %    MCV 95 78 - 100 fL    MCH 28.3 26.5 - 33.0 pg    MCHC 29.7 (L) 31.5 - 36.5 g/dL    RDW 15.1 (H) 10.0 - 15.0 %    Platelet Count 210 150 - 450 10e3/uL    % Neutrophils      % Lymphocytes      % Monocytes      % Eosinophils      % Basophils      % Immature Granulocytes      NRBCs per 100 WBC 0 <1 /100    Absolute Neutrophils      Absolute Lymphocytes      Absolute Monocytes      Absolute Eosinophils      Absolute Basophils      Absolute Immature Granulocytes      Absolute NRBCs 0.0 10e3/uL   Manual Differential   Result Value Ref Range    % Neutrophils 81 %    % Lymphocytes 4 %    % Monocytes 12 %    % Eosinophils 1 %    % Basophils 0 %    % Myelocytes 2 %    Absolute Neutrophils 32.2 (H) 1.6 - 8.3 10e3/uL    Absolute Lymphocytes 1.6 0.8 - 5.3 10e3/uL    Absolute Monocytes 4.8 (H) 0.0 - 1.3 10e3/uL    Absolute Eosinophils 0.4 0.0 - 0.7 10e3/uL    Absolute Basophils 0.0 0.0 - 0.2 10e3/uL    Absolute Myelocytes 0.8 (H) <=0.0 10e3/uL    RBC Morphology Confirmed RBC Indices     Platelet Assessment  Automated Count Confirmed. Platelet morphology is normal.     Automated Count Confirmed. Platelet morphology is normal.   Troponin T, High Sensitivity   Result Value Ref Range    Troponin T, High Sensitivity 27 (H) <=14 ng/L   Magnesium   Result Value Ref Range    Magnesium 2.3 1.7 -  2.3 mg/dL   Asymptomatic COVID-19 Virus (Coronavirus) by PCR Nasopharyngeal    Specimen: Nasopharyngeal; Swab   Result Value Ref Range    SARS CoV2 PCR Negative Negative    Narrative    Testing was performed using the Xpert Xpress SARS-CoV-2 Assay on the Cepheid Gene-Xpert Instrument Systems. Additional information about this Emergency Use Authorization (EUA) assay can be found via the Lab Guide. This test should be ordered for the detection of SARS-CoV-2 in individuals who meet SARS-CoV-2 clinical and/or epidemiological criteria as well as from individuals without symptoms or other reasons to suspect COVID-19. Test performance for asymptomatic patients has only been established in anterior nasal swab specimens. This test is for in vitro diagnostic use under the FDA EUA for laboratories certified under CLIA to perform high complexity testing. This test has not been FDA cleared or approved. A negative result does not rule out the presence of PCR inhibitors in the specimen or target RNA concentration below the limit of detection for the assay. The possibility of a false negative should be considered if the patient's recent exposure or clinical presentation suggests COVID-19. This test was validated by the [facility]. This laboratory is certified under the Clinical Laboratory Improvement Amendments (CLIA) as qualified to perform high complexity laboratory testing.     CT Abdomen Pelvis w Contrast    Narrative    EXAM: CT ABDOMEN PELVIS W CONTRAST  LOCATION: [facility]  DATE: 11/1/2023    INDICATION: abdominal pain; h o lung cancer with mets  COMPARISON: 09/29/2023  TECHNIQUE: CT scan of the abdomen and pelvis was performed following injection of IV contrast. Multiplanar reformats were obtained. Dose reduction techniques were used.  CONTRAST: [id number] 75ml    FINDINGS:   LOWER CHEST: Tiny right pleural effusion with associated right lower lobe airspace  opacities. Previously noted a 0.3 cm middle lobe nodule, unchanged compared to 09/29/2023. Mild stable elevation right hemidiaphragm. Small hiatal hernia.    HEPATOBILIARY: The gallbladder is distended. Clinical correlation for associated symptomatology recommended. Interval development of low attenuation in the liver adjacent to the leigh ann hepatis likely representing fatty infiltration. Scattered stable tiny   cysts in the liver. No calcified gallstones. Interval increasing fluid adjacent to the anterior and right lateral liver    PANCREAS: No significant mass, duct dilatation, or inflammatory change.    SPLEEN: Normal.    ADRENAL GLANDS: Increasing right adrenal loculated nodularity with adjacent surrounding high attenuation that extends inferiorly along the right kidney. 1.8 x 1.4 cm left adrenal stable.    KIDNEYS/BLADDER: Bilateral kidneys enhance symmetrically without evidence for pyelonephritis. No significant mass, stones, or hydronephrosis. There are simple or benign cysts. No follow up is needed.    BOWEL: Interval development of dilated fluid-filled loops of small bowel with transition in the pelvis. Decompressed loops of small bowel. Findings are consistent with high-grade mechanical small bowel obstruction. The etiology of the obstruction is not   clearly identified.    LYMPH NODES: No lymphadenopathy.    VASCULATURE: No abdominal aortic aneurysm. Moderate multifocal atherosclerotic plaques and calcifications of the abdominal aorta and common iliac arteries.    PELVIC ORGANS: No pelvic masses.    MUSCULOSKELETAL: Left hip arthroplasty. No osteolytic or osteoblastic lesions. Mild demineralization of visualized osseous structures. Moderate to marked stable compression deformity of T12. Moderate to marked thoracolumbar spondylosis.      Impression    IMPRESSION:   1.  Interval development of dilated fluid-filled loops of small bowel with transition in the pelvis. Decompressed loops of small bowel. Findings  are consistent with high-grade mechanical small bowel obstruction. The etiology of the obstruction is not   clearly identified.      2.  Increasing right adrenal loculated nodularity with adjacent surrounding high attenuation that extends inferiorly along the right kidney. 1.8 x 1.4 cm left adrenal stable. Findings based on prior reported concerning for metastatic disease.    3.  The gallbladder is distended. Clinical correlation for associated symptomatology recommended. Interval increasing fluid adjacent to the anterior and right lateral liver.    4.  Interval development of low attenuation in the liver adjacent to the leigh ann hepatis likely representing fatty infiltration. Scattered stable tiny cysts in the liver. No calcified gallstones.    UA with Microscopic reflex to Culture    Specimen: Urine, NOS   Result Value Ref Range    Color Urine Light Yellow Colorless, Straw, Light Yellow, Yellow    Appearance Urine Clear Clear    Glucose Urine Negative Negative mg/dL    Bilirubin Urine Negative Negative    Ketones Urine Negative Negative mg/dL    Specific Gravity Urine 1.025 1.001 - 1.030    Blood Urine Negative Negative    pH Urine 6.0 5.0 - 7.0    Protein Albumin Urine 10 (A) Negative mg/dL    Urobilinogen Urine <2.0 <2.0 mg/dL    Nitrite Urine Negative Negative    Leukocyte Esterase Urine Negative Negative    RBC Urine 1 <=2 /HPF    WBC Urine 2 <=5 /HPF    Squamous Epithelials Urine 1 <=1 /HPF    Narrative    Urine Culture not indicated   XR Abdomen Port 1 View    Narrative    EXAM: XR ABDOMEN PORT 1 VIEW  LOCATION: [facility]  DATE: 11/2/2023    INDICATION: sp ng tube placement  COMPARISON: None.      Impression    IMPRESSION: NG tube in good position within stomach. Multiple loops of mildly dilated small bowel are present.  Contrast within bladder. Scoliosis and left hip arthroplasty.   Basic metabolic panel   Result Value Ref Range    Sodium 141 135 - 145 mmol/L    Potassium 4.3 3.4 -  5.3 mmol/L    Chloride 108 (H) 98 - 107 mmol/L    Carbon Dioxide (CO2) 24 22 - 29 mmol/L    Anion Gap 9 7 - 15 mmol/L    Urea Nitrogen 20.6 8.0 - 23.0 mg/dL    Creatinine 1.27 (H) 0.51 - 0.95 mg/dL    GFR Estimate 43 (L) >60 mL/min/1.73m2    Calcium 8.8 8.8 - 10.2 mg/dL    Glucose 115 (H) 70 - 99 mg/dL   CBC with platelets   Result Value Ref Range    WBC Count 44.6 (H) 4.0 - 11.0 10e3/uL    RBC Count 4.17 3.80 - 5.20 10e6/uL    Hemoglobin 11.9 11.7 - 15.7 g/dL    Hematocrit 39.4 35.0 - 47.0 %    MCV 95 78 - 100 fL    MCH 28.5 26.5 - 33.0 pg    MCHC 30.2 (L) 31.5 - 36.5 g/dL    RDW 15.1 (H) 10.0 - 15.0 %    Platelet Count 205 150 - 450 10e3/uL     *Note: Due to a large number of results and/or encounters for the requested time period, some results have not been displayed. A complete set of results can be found in Results Review.           Alia Rayo PA-C  [facility]  [facility] [address]

## 2023-11-02 NOTE — ED NOTES
Patient in bed, resting with eyes closed on 4LPM nasal canula. Breathing is unlabored. Appears more comfortable.

## 2023-11-02 NOTE — H&P
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  She is admitted on 11/1/2023 for small bowel obstruction.    SBO  CT showing high-grade mechanical small bowel obstruction and dilated fluid-filled loops of small bowel.  Likely secondary to known cancers.  WBC 39.7, lactic acid wnl.  -NG tube placement with low intermediate suction  -General surgery consult: Appreciate recommendations and cares  -May consider gastrografin study 11/2  -NPO  -PRN IV dilaudid 0.5 mg q2h  -PRN IV compazine  -AM CBC    Anxiety  Has home hydroxyzine 25-50 mg q6h PRN, but unable to take due to NPO.  Exacerbated by NG tube.  -PTA lorazepam 0.5 mg q6h PRN if hydroxyzine ineffective    Non-small cell lung carcinoma with metastatic disease including brain  Invasive ductal carcinoma of left breast  CT showing increasing right adrenal loculated nodularity with surrounding high attenuation.  Hospice meeting scheduled for 11/2/2023 outpatient -multiple qualifications including congestive heart failure with ejection fraction of 20%, end-stage COPD requiring oxygen, progressing metastatic cancer.  -Follows with Dr. Suarez with [facility] oncology    COPD  Oxygen dependent at 2-3 L at baseline.  Previously hospitalization from 10/25-10/28 for COPD exacerbation.  -Completing prednisone taper with IV methylprednisolone. Last dose of prednisone 30 mg on 11/2. Give IV methylprednisolone 24mg 11/2 and then decrease to 16 mg for 3 days. Revert to oral prednisone when able to tolerate oral medications.  -PTA Breo Ellipta 1 puff at bedtime, DuoNeb PRN, Mucomyst PRN  -Supplemental oxygen with NC  -Continuous pulse ox    HFrEF  Last echo completed  on 6/22/2023 with an ejection fraction of 20-25%, grade 3 diastolic dysfunction, decreased ventricle systolic function.  On spironolactone 25 mg daily and metoprolol succinate 50 mg at bedtime  -Restart medication once able to tolerate oral medications  -Gentle fluids, monitor closely    Atrial fibrillation  Previously on rivaroxaban.  History of hemorrhagic metastatic brain disease masses noted on previous hospitalization.  Rivaroxaban discontinued.  -PRN metoprolol 5mg for sustained tachycardia with HR>130 for 10 minutes    Leukocytosis: secondary to steroid medications    Chronic medications:  Mood: Hold PTA Lexapro 10 mg daily, IM 5 mg Zyprexa at bedtime  Allergies: Hold Flonase and cetirizine  Overactive bladder: Hold mirabegron   Hypercholesterolemia: Hold simvastatin 40 mg at bedtime        Diet: NPO for Medical/Clinical Reasons Except for: No Exceptions  DVT Prophylaxis: SCDs, no medications due to history of hemorrhagic metastatic brain masses and adrenal masses.  Hayes Catheter: Not present  Fluids: IV NS 75 mL/hr  Lines: PRESENT         Cardiac Monitoring: None  Code Status: No CPR- Do NOT Intubate    Clinically Significant Risk Factors Present on Admission              # Hypoalbuminemia: Lowest albumin = 3.4 g/dL at 11/1/2023 10:17 PM, will monitor as appropriate     # Hypertension: Noted on problem list    # Chronic heart failure with reduced ejection fraction: last echo with EF <40%         # COPD: noted on problem list        Disposition Plan      Expected Discharge Date: 11/04/2023                The patient's care was discussed with the Attending Physician, Dr. Elisha Guardado and will be seen in the morning by attending provider, Dr. Janae Perez .      Kaylee Lamb MD  Hospitalist Service  [facility]  Securely message with [employer] (more info)  Text page via [facility] Paging/Directory   ______________________________________________________________________    Chief Complaint    Abdominal pain    History is obtained from the patient.    History of Present Illness   Irene León is a 78 year old female with a past medical history of non-small cell lung carcinoma with metastases including brain, breast cancer, right-sided adrenal hemorrhage, HFrEF, COPD with oxygen dependence, anxiety.  She is admitted on 11/1/2023 for small bowel obstruction.    Daughter Leelee was with patient earlier in the day.  Daughter told emergency room physician patient's pain was too severe to be controlled at her TCU.  Per emergency physician report, daughter went home due to feeling overwhelmed.  Meaning history was provided by patient only.    Patient recently hospitalized from 10/25 - 10/28 for acute on chronic hypoxic respiratory failure secondary due to COPD exacerbation.  She was also found to have progressing metastatic disease to her brain from her non-small cell carcinoma lung cancer.  Her hospitalization prior to that showed right sided adrenal gland nodule with hemorrhage.  Patient was scheduled to have a meeting with hospice services at her TCU on 11/2.    History was limited due to patient having increased pain with NG placement.  Large amount of stomach residue removed.  Patient asking to have NG tube removed.  Discussed importance and pain reduction with having NG staying in place.  Patient given Ativan for placement.    Patient endorses abdominal pain located right in the center of her abdomen.  She endorses having 2 episodes of emesis before coming into the emergency department.    Past Medical History    Past Medical History:   Diagnosis Date    ANATOLIY (acute kidney injury) (H24)     Allergic rhinitis     Arrhythmia     Atrial fibrillation with RVR (H)     Bacteremia     Breast cancer (H) 2017    Cardiomyopathy (H)     Centrilobular emphysema (H)     CHF (congestive heart failure) (H)     Chronic kidney disease     CKD (chronic kidney disease)     COPD (chronic obstructive pulmonary disease) (H)      Coronary artery disease     Depression     Dysphagia     E. coli sepsis (H)     Factor 5 Leiden mutation, heterozygous (H24)     GERD (gastroesophageal reflux disease)     Heart failure with reduced ejection fraction (H) 04/17/2023    Hx of radiation therapy 2017    Hyperlipidemia     Hypertension     Hypokalemia     Hypomagnesemia     Idiopathic cardiomyopathy (H)     Left hip pain 04/30/2014    OAB (overactive bladder)     Osteoporosis     Sacral insufficiency fracture     Sinusitis     Syncope     Urge incontinence     Vocal cord dysfunction        Past Surgical History   Past Surgical History:   Procedure Laterality Date    ARTHROPLASTY REVISION HIP Left     BIOPSY BREAST Right 2017    BLADDER SURGERY      bladder interstim with removal    CARDIAC CATHETERIZATION      CATARACT EXTRACTION Left 07/18/2017    IR ABDOMINAL AORTOGRAM  4/16/2003    IR AORTIC ARCH 4 VESSEL ANGIOGRAM  4/16/2003    IR MISCELLANEOUS PROCEDURE  4/16/2003    LUMPECTOMY BREAST Left 06/2017    x2    PICC DOUBLE LUMEN PLACEMENT  4/11/2022         PICC TRIPLE LUMEN PLACEMENT  4/7/2022         PICC TRIPLE LUMEN PLACEMENT  10/25/2023    ZZC OPEN TX FEMORAL FRACTURE DISTAL MED/LAT CONDYLE Left 10/28/2015    Procedure: OPEN REDUCTION INTERNAL FIXATION LEFT  PROXIMAL FEMUR PERIPROSTHETIC FRACTURE;  Surgeon: Yovanny Albarran MD;  Location: [facility];  Service: Orthopedics       Prior to Admission Medications   Prior to Admission Medications   Prescriptions Last Dose Informant Patient Reported? Taking?   Azelastine HCl 137 MCG/SPRAY SOLN prn  Yes Yes   Sig: Spray 1 spray into both nostrils 2 times daily as needed for rhinitis   Fluticasone-Umeclidin-Vilanterol (TRELEGY ELLIPTA) 200-62.5-25 MCG/INH oral inhaler 10/31/2023 at hs does not have  Yes Yes   Sig: Inhale 1 puff into the lungs At Bedtime   LORazepam (ATIVAN) 0.5 MG tablet prn  No Yes   Sig: Take 1 tablet (0.5 mg) by mouth every 6 hours as needed for anxiety Give Hydroxyzine 25-50  mg prior to Lorazepam. If Hydroxyzine not effective for anxiety, give Lorazepam.   Melatonin 5 MG TBDP 10/31/2023 at hs  Yes Yes   Sig: Take 15 mg by mouth At Bedtime   OLANZapine (ZYPREXA) 5 MG tablet 10/31/2023 at hs  No Yes   Sig: Take 1 tablet (5 mg) by mouth At Bedtime   acetaminophen (TYLENOL) 500 MG tablet 11/1/2023 at x2  Yes Yes   Sig: Take 1,000 mg by mouth 3 times daily May also take 1,000mg by mouth once daily PRN   acetaminophen (TYLENOL) 500 MG tablet prn  Yes Yes   Sig: Take 1,000 mg by mouth daily as needed for mild pain   acetylcysteine (MUCOMYST) 10 % nebulizer solution prn  Yes Yes   Sig: Inhale 4 mLs into the lungs every 4 hours as needed for mucolysis/respiratory distress   albuterol (PROAIR HFA/PROVENTIL HFA/VENTOLIN HFA) 108 (90 Base) MCG/ACT inhaler prn  Yes Yes   Sig: Inhale 2 puffs into the lungs every 6 hours as needed for shortness of breath, wheezing or cough   benzonatate (TESSALON PERLES) 100 MG capsule prn  No Yes   Sig: Take 1 capsule (100 mg) by mouth 3 times daily as needed for cough   bisacodyl (DULCOLAX) 10 MG suppository prn  Yes Yes   Sig: Place 10 mg rectally daily as needed for constipation   cetirizine (ZYRTEC) 5 MG tablet prn  No Yes   Sig: Take 1 tablet (5 mg) by mouth daily as needed for allergies   chlorhexidine (PERIDEX) 0.12 % solution prn  Yes Yes   Sig: Take 15 mLs by mouth nightly as needed   dexAMETHasone (DECADRON) 1 MG tablet not started  No Yes   Sig: Take 1 tablet (1 mg) by mouth 2 times daily (with meals)   escitalopram (LEXAPRO) 10 MG tablet 11/1/2023 at am  No Yes   Sig: Take 1 tablet (10 mg) by mouth daily   famotidine (PEPCID) 20 MG tablet 11/1/2023 at am  No Yes   Sig: Take 1 tablet (20 mg) by mouth every other day   fluticasone (FLONASE) 50 MCG/ACT nasal spray prn  Yes Yes   Sig: Spray 1 spray into both nostrils daily as needed for rhinitis or allergies   hydrOXYzine (ATARAX) 25 MG tablet prn  No Yes   Sig: Take 1-2 tablets (25-50 mg) by mouth every 6  hours as needed for anxiety (adjuvant pain)   ipratropium (ATROVENT HFA) 17 MCG/ACT inhaler prn  Yes Yes   Sig: Inhale 2 puffs into the lungs every 6 hours as needed for wheezing   ipratropium - albuterol 0.5 mg/2.5 mg/3 mL (DUONEB) 0.5-2.5 (3) MG/3ML neb solution prn  Yes Yes   Sig: Take 1 vial by nebulization every 6 hours as needed for shortness of breath, wheezing or cough   metoprolol succinate ER (TOPROL XL) 50 MG 24 hr tablet 10/31/2023 at hs  No Yes   Sig: Take 1 tablet (50 mg) by mouth At Bedtime   mirabegron (MYRBETRIQ) 25 MG 24 hr tablet 11/1/2023 at am  No Yes   Sig: Take 1 tablet (25 mg) by mouth daily   nystatin (MYCOSTATIN) 502415 UNIT/GM external cream prn  Yes Yes   Sig: Apply topically 3 times daily as needed for dry skin   predniSONE (DELTASONE) 20 MG tablet 11/1/2023 at 30mg  No Yes   Sig: Take 2 tablets (40 mg) by mouth daily for 3 days, THEN 1.5 tablets (30 mg) daily for 3 days, THEN 1 tablet (20 mg) daily for 3 days, THEN 0.5 tablets (10 mg) daily for 3 days.   prochlorperazine (COMPAZINE) 10 MG tablet prn  Yes Yes   Sig: Take 10 mg by mouth every 6 hours as needed for nausea or vomiting   simvastatin (ZOCOR) 40 MG tablet 10/31/2023 at hs  No Yes   Sig: Take 1 tablet (40 mg) by mouth At Bedtime   sodium chloride 0.9 % neb solution prn  Yes Yes   Sig: Take 3 mLs by nebulization every 3 hours as needed for wheezing   spironolactone (ALDACTONE) 25 MG tablet 11/1/2023 at am  No Yes   Sig: Take 1 tablet (25 mg) by mouth daily   traMADol (ULTRAM) 50 MG tablet 11/1/2023 at x2  No Yes   Sig: Take 1 tablet (50 mg) by mouth 3 times daily   traMADol (ULTRAM) 50 MG tablet prn  Yes Yes   Sig: Take 50 mg by mouth daily as needed for severe pain   zinc oxide (DESITIN) 40 % external ointment prn  No Yes   Sig: Apply topically as needed for dry skin or irritation (Twice a day to gluteal area skin irritation)      Facility-Administered Medications: None        Review of Systems    The 10 point Review of Systems  is negative other than noted in the HPI or here.     Social History   I have reviewed this patient's social history and updated it with pertinent information if needed.  Social History     Tobacco Use    Smoking status: Former     Types: Cigarettes     Quit date: 10/28/2007     Years since quittin.0     Passive exposure: Past    Smokeless tobacco: Former     Quit date: 2004   Vaping Use    Vaping Use: Former   Substance Use Topics    Alcohol use: No    Drug use: No         Family History   I have reviewed this patient's family history and updated it with pertinent information if needed.  Family History   Problem Relation Age of Onset    Osteoporosis Other     Prostate Cancer Brother     Breast Cancer Maternal Aunt         age thought to be in her 70's-80's    Prostate Cancer Maternal Uncle          Allergies   Allergies   Allergen Reactions    Sulfa (Sulfonamide Antibiotics) [Sulfa Antibiotics] Rash     Headaches and dizziness.    Homeopathic Drugs [External Allergen Needs Reconciliation - See Comment] Unknown     runny nose    Mold Extracts [Molds & Smuts] Unknown    Morphine (Pf) [Morphine] Unknown     hallucinate    Lisinopril Itching, Cough and Unknown     cough    Sulfacetamide Sodium [Sulfacetamide] Rash        Physical Exam   Vital Signs: Temp: 98  F (36.7  C) Temp src: Temporal BP: (!) 137/94 Pulse: 92   Resp: 22 SpO2: 94 % O2 Device: Nasal cannula Oxygen Delivery: 5 LPM  Weight: 111 lbs 0 oz    Limited exam due to cooperation.    General appearance: alert, mild distress, cooperative, frustrated  Head: normocephalic, without obvious abnormalities  Eyes: Refused to open eyes during exam  Ears: hearing grossly intact  Nose: Nasal cannula in place, NG in place  Lung: Expiratory wheeze heard diffusely, no coughing  Chest wall: no tenderness  Heart: regular rate and rhythm, S1, S2 normal, no murmur, click, rub, or gallop  Abdomen: Tender, nondistended, hypoactive bowel sounds  Extremities:  Hyperpigmentation, bilateral edema, small lacerations with routine healing  Psychologic: Frustrated, did not want to talk, quiet speech    Data     I have personally reviewed the following data over the past 24 hrs:    39.7 (H)  \   12.0   / 210     142 107 18.8 /  93   4.6 25 1.34 (H) \     ALT: <5 AST: 28 AP: 62 TBILI: 0.2   ALB: 3.4 (L) TOT PROTEIN: 7.2 LIPASE: 60     Procal: N/A CRP: 15.60 (H) Lactic Acid: 1.2       INR:  1.08 PTT:  N/A   D-dimer:  N/A Fibrinogen:  N/A       Imaging results reviewed over the past 24 hrs:   Recent Results (from the past 24 hour(s))   CT Abdomen Pelvis w Contrast    Narrative    EXAM: CT ABDOMEN PELVIS W CONTRAST  LOCATION: [facility]  DATE: 11/1/2023    INDICATION: abdominal pain; h o lung cancer with mets  COMPARISON: 09/29/2023  TECHNIQUE: CT scan of the abdomen and pelvis was performed following injection of IV contrast. Multiplanar reformats were obtained. Dose reduction techniques were used.  CONTRAST: [id number] 75ml    FINDINGS:   LOWER CHEST: Tiny right pleural effusion with associated right lower lobe airspace opacities. Previously noted a 0.3 cm middle lobe nodule, unchanged compared to 09/29/2023. Mild stable elevation right hemidiaphragm. Small hiatal hernia.    HEPATOBILIARY: The gallbladder is distended. Clinical correlation for associated symptomatology recommended. Interval development of low attenuation in the liver adjacent to the leigh ann hepatis likely representing fatty infiltration. Scattered stable tiny   cysts in the liver. No calcified gallstones. Interval increasing fluid adjacent to the anterior and right lateral liver    PANCREAS: No significant mass, duct dilatation, or inflammatory change.    SPLEEN: Normal.    ADRENAL GLANDS: Increasing right adrenal loculated nodularity with adjacent surrounding high attenuation that extends inferiorly along the right kidney. 1.8 x 1.4 cm left adrenal stable.    KIDNEYS/BLADDER: Bilateral  kidneys enhance symmetrically without evidence for pyelonephritis. No significant mass, stones, or hydronephrosis. There are simple or benign cysts. No follow up is needed.    BOWEL: Interval development of dilated fluid-filled loops of small bowel with transition in the pelvis. Decompressed loops of small bowel. Findings are consistent with high-grade mechanical small bowel obstruction. The etiology of the obstruction is not   clearly identified.    LYMPH NODES: No lymphadenopathy.    VASCULATURE: No abdominal aortic aneurysm. Moderate multifocal atherosclerotic plaques and calcifications of the abdominal aorta and common iliac arteries.    PELVIC ORGANS: No pelvic masses.    MUSCULOSKELETAL: Left hip arthroplasty. No osteolytic or osteoblastic lesions. Mild demineralization of visualized osseous structures. Moderate to marked stable compression deformity of T12. Moderate to marked thoracolumbar spondylosis.      Impression    IMPRESSION:   1.  Interval development of dilated fluid-filled loops of small bowel with transition in the pelvis. Decompressed loops of small bowel. Findings are consistent with high-grade mechanical small bowel obstruction. The etiology of the obstruction is not   clearly identified.      2.  Increasing right adrenal loculated nodularity with adjacent surrounding high attenuation that extends inferiorly along the right kidney. 1.8 x 1.4 cm left adrenal stable. Findings based on prior reported concerning for metastatic disease.    3.  The gallbladder is distended. Clinical correlation for associated symptomatology recommended. Interval increasing fluid adjacent to the anterior and right lateral liver.    4.  Interval development of low attenuation in the liver adjacent to the leigh ann hepatis likely representing fatty infiltration. Scattered stable tiny cysts in the liver. No calcified gallstones.    XR Abdomen Port 1 View    Narrative    EXAM: XR ABDOMEN PORT 1 VIEW  LOCATION: [facility]  [facility]  DATE: 11/2/2023    INDICATION: sp ng tube placement  COMPARISON: None.      Impression    IMPRESSION: NG tube in good position within stomach. Multiple loops of mildly dilated small bowel are present.  Contrast within bladder. Scoliosis and left hip arthroplasty.

## 2023-11-03 NOTE — PLAN OF CARE
Problem: Adult Inpatient Plan of Care  Goal: Optimal Comfort and Wellbeing  Outcome: Progressing  Problem: Intestinal Obstruction  Goal: Optimal Pain Control and Function  Outcome: Progressing  Problem: Pain Acute  Goal: Optimal Pain Control and Function  Outcome: Progressing   Goal Outcome Evaluation:  Pt lethargic but easily aroused, report nausea and abdominal pain, managed with reposition, integrative therapy, and prn medication, LS coarse, pt on 4L oxygen nasal cannula, O2 sat >91%, IVF infusing, pt remained NPO but constantly asked for water, oral care done, prn biotene given with good effect, gastrografin/XR abdominal completed, NG tube back to LIS, activity encouraged, family visited and updated status, continue to monitor.

## 2023-11-03 NOTE — PROGRESS NOTES
General Surgery Progress Note:    Hospital Day # 1    ASSESSMENT:   1. Small bowel obstruction (H)    2. Primary malignant neoplasm of lung metastatic to other site, unspecified laterality (H)    3. ANATOLIY (acute kidney injury) (H24)    4. Leukocytosis, unspecified type        Irene León is a 78 year old female PMH of metastatic non-small cell lung adenocarcinoma, HFrEF, COPD on home O2, stage IV CKD, a-fib who was admitted with a small bowel obstruction. NPO with NG tube in place and underwent Gastrografin challenge with follow-up abdominal x-ray 8-hours post contrast administration with contrast within the stomach and minimal contrast in the small bowel. Patient also denies return of bowel function. Patient is not a good surgical candidate and she also declines any procedures at this time. Recommend continue NG tube decompression and allow more time for bowels to hopefully open up. Palliative care consulted for goals of care discussion and further symptom management. Could consider a venting G-tube with IR if this is within the patient's wishes pending clinical course.    PLAN:   - Continue NG tube to LIS for bowel decompression  - Okay for clear liquid diet for comfort  - No plan for surgical intervention at this time. Patient declines any surgical procedures.  - Activity as tolerated to help promote bowel function  - Continue medical management per primary team  - Palliative care consult for goals of care discussion and symptom management  - General surgery will continue to follow      SUBJECTIVE:   Irene León is feeling poorly this morning. Endorses throat pain and abdominal pain. Denies nausea, vomiting, flatus, BM. Patient does not want any surgical procedures at this time. She is requesting water to drink.    Patient Vitals for the past 24 hrs:   BP Temp Temp src Pulse Resp SpO2   11/03/23 0028 118/84 97.2  F (36.2  C) Oral 116 20 91 %   11/02/23 2243 -- -- -- -- -- 91 %   11/02/23 2155 -- -- -- -- --  93 %   11/02/23 2123 -- -- -- -- -- 92 %   11/02/23 2028 -- -- -- -- -- 91 %   11/02/23 1901 -- -- -- -- -- 91 %   11/02/23 1750 -- -- -- -- -- 91 %   11/02/23 1620 (!) 158/70 98.2  F (36.8  C) Oral 104 20 90 %   11/02/23 1550 -- -- -- -- -- 91 %   11/02/23 1434 (!) 160/72 97.3  F (36.3  C) Oral 101 20 93 %   11/02/23 1200 (!) 158/70 -- -- 98 19 94 %   11/02/23 1159 (!) 158/70 -- -- -- -- --   11/02/23 1156 (!) 180/74 98.3  F (36.8  C) -- 102 22 94 %       Physical Exam:  General: patient seen resting in bed, no acute distress  HEENT: NG tube in place  Resp: no respiratory distress, breathing comfortably on 4L via nasal canula  Abdomen: Softly distended, tender to palpation over suprapubic and RLQ regions. No rebound or guarding.  Extremities: warm and well perfused    Admission on 11/01/2023   Component Date Value    Systolic Blood Pressure 11/01/2023 134     Diastolic Blood Pressure 11/01/2023 95     Ventricular Rate 11/01/2023 77     Atrial Rate 11/01/2023 77     WV Interval 11/01/2023 126     QRS Duration 11/01/2023 116     QT 11/01/2023 426     QTc 11/01/2023 482     P Axis 11/01/2023 79     R AXIS 11/01/2023 -58     T [place] 11/01/2023 107     Interpretation ECG 11/01/2023                      Value:Sinus rhythm  Left axis deviation  ST & T wave abnormality, consider lateral ischemia  Prolonged QT  Abnormal ECG  When compared with ECG of 25-OCT-2023 16:48,  WV interval has decreased  ST now depressed in Inferior leads  T wave inversion more evident in Lateral leads  Confirmed by SEE ED PROVIDER NOTE FOR, ECG INTERPRETATION ([id number]),  Michael Zamorano ([id number]) on 11/2/2023 5:26:28 AM      INR 11/01/2023 1.08     Sodium 11/01/2023 142     Potassium 11/01/2023 4.6     Carbon Dioxide (CO2) 11/01/2023 25     Anion Gap 11/01/2023 10     Urea Nitrogen 11/01/2023 18.8     Creatinine 11/01/2023 1.34 (H)     GFR Estimate 11/01/2023 40 (L)     Calcium 11/01/2023 9.6     Chloride 11/01/2023 107     Glucose 11/01/2023 93      Alkaline Phosphatase 11/01/2023 62     AST 11/01/2023 28     ALT 11/01/2023 <5     Protein Total 11/01/2023 7.2     Albumin 11/01/2023 3.4 (L)     Bilirubin Total 11/01/2023 0.2     Lipase 11/01/2023 60     Lactic Acid 11/01/2023 1.2     CRP Inflammation 11/01/2023 15.60 (H)     Color Urine 11/01/2023 Light Yellow     Appearance Urine 11/01/2023 Clear     Glucose Urine 11/01/2023 Negative     Bilirubin Urine 11/01/2023 Negative     Ketones Urine 11/01/2023 Negative     Specific Gravity Urine 11/01/2023 1.025     Blood Urine 11/01/2023 Negative     pH Urine 11/01/2023 6.0     Protein Albumin Urine 11/01/2023 10 (A)     Urobilinogen Urine 11/01/2023 <2.0     Nitrite Urine 11/01/2023 Negative     Leukocyte Esterase Urine 11/01/2023 Negative     RBC Urine 11/01/2023 1     WBC Urine 11/01/2023 2     Squamous Epithelials Uri* 11/01/2023 1     WBC Count 11/01/2023 39.7 (H)     RBC Count 11/01/2023 4.24     Hemoglobin 11/01/2023 12.0     Hematocrit 11/01/2023 40.4     MCV 11/01/2023 95     MCH 11/01/2023 28.3     MCHC 11/01/2023 29.7 (L)     RDW 11/01/2023 15.1 (H)     Platelet Count 11/01/2023 210     NRBCs per 100 WBC 11/01/2023 0     Absolute NRBCs 11/01/2023 0.0     SARS CoV2 PCR 11/01/2023 Negative     % Neutrophils 11/01/2023 81     % Lymphocytes 11/01/2023 4     % Monocytes 11/01/2023 12     % Eosinophils 11/01/2023 1     % Basophils 11/01/2023 0     % Myelocytes 11/01/2023 2     Absolute Neutrophils 11/01/2023 32.2 (H)     Absolute Lymphocytes 11/01/2023 1.6     Absolute Monocytes 11/01/2023 4.8 (H)     Absolute Eosinophils 11/01/2023 0.4     Absolute Basophils 11/01/2023 0.0     Absolute Myelocytes 11/01/2023 0.8 (H)     RBC Morphology 11/01/2023 Confirmed RBC Indices     Platelet Assessment 11/01/2023 Automated Count Confirmed. Platelet morphology is normal.     Sodium 11/02/2023 141     Potassium 11/02/2023 4.3     Chloride 11/02/2023 108 (H)     Carbon Dioxide (CO2) 11/02/2023 24     Anion Gap 11/02/2023 9      Urea Nitrogen 11/02/2023 20.6     Creatinine 11/02/2023 1.27 (H)     GFR Estimate 11/02/2023 43 (L)     Calcium 11/02/2023 8.8     Glucose 11/02/2023 115 (H)     WBC Count 11/02/2023 44.6 (H)     RBC Count 11/02/2023 4.17     Hemoglobin 11/02/2023 11.9     Hematocrit 11/02/2023 39.4     MCV 11/02/2023 95     MCH 11/02/2023 28.5     MCHC 11/02/2023 30.2 (L)     RDW 11/02/2023 15.1 (H)     Platelet Count 11/02/2023 205     Troponin T, High Sensiti* 11/01/2023 27 (H)     Magnesium 11/01/2023 2.3     WBC Count 11/03/2023 39.5 (H)     RBC Count 11/03/2023 3.89     Hemoglobin 11/03/2023 11.0 (L)     Hematocrit 11/03/2023 37.4     MCV 11/03/2023 96     MCH 11/03/2023 28.3     MCHC 11/03/2023 29.4 (L)     RDW 11/03/2023 15.6 (H)     Platelet Count 11/03/2023 191     Sodium 11/03/2023 147 (H)     Potassium 11/03/2023 4.2     Chloride 11/03/2023 113 (H)     Carbon Dioxide (CO2) 11/03/2023 25     Anion Gap 11/03/2023 9     Urea Nitrogen 11/03/2023 17.8     Creatinine 11/03/2023 1.16 (H)     GFR Estimate 11/03/2023 48 (L)     Calcium 11/03/2023 8.4 (L)     Glucose 11/03/2023 98         Alia Rayo PA-C  [facility] General Surgery  [address]  [address]  [place] [address]

## 2023-11-03 NOTE — PLAN OF CARE
Problem: Risk for Delirium  Goal: Improved Behavioral Control  Outcome: Progressing  Intervention: Minimize Safety Risk  Recent Flowsheet Documentation  Taken 11/3/2023 0028 by Amalia Webster RN  Communication Enhancement Strategies: call light answered in person  Enhanced Safety Measures: room near unit station     Problem: Risk for Delirium  Goal: Improved Sleep  Outcome: Progressing   Goal Outcome Evaluation:       Pt is disoriented to month (thought it's still October). O2 at 4LPM, sats fluctuates between 85-93%. NGT on LIS. Minimal output this shift. Purewick in place. Pt maintained on NPO. Mouth swab provided. Lorazepam administered d/t restlessness and body shakes, teeth chattering. Pt stated d/t anxiety and not pain. Held 4am dose of Dilaudid, pt appeared sedated. Pt settled and asleep at this time. VSS. Will continue to monitor and will keep comfortable.

## 2023-11-03 NOTE — PLAN OF CARE
Stephanie is A & Ox4, painful, c/o pain in throat and abdomen. Early rounding done with BFM team, daughter Leelee arrived afterwards. She expressed frustration to staff regarding med regimen and desires to be present when physicians round.      Transitioned to comfort care mid-morning. She has been given Ativan 0.5mg IV x2 this shift, Robinol IV x1, and scheduled IV Dilaudid. She does become anxious/restless with pain. Otherwise she is somnolent.     NG remining in place for now, as Stephanie wants to drink water, to provide some pain relief.     Spiritual Care provider spoke with family, many family members visiting today.

## 2023-11-03 NOTE — PROGRESS NOTES
SP[facility]UAL [facility] SERVICES ([facility])  SPIRITUAL ASSESSMENT Progress Note  [facility]. [address]     REFERRAL SOURCE: Nurse     Thank you for the referral. Stephanie is now on comfort care. Family was informed that Fr. Erich Reyez anointed her on  so she is in a state of ang. Her   about 5 - 6 weeks ago so the family is experiencing complicated grief as they prepare for a second death      PLAN: [facility] are available for emotional and spiritual support.      Zabrina Levine, Ph.D., [facility]      [facility]S available  for emergency requests/referrals, either by having the on-call  paged or by entering an ASAP/STAT consult in Epic (this will also page the on-call ).

## 2023-11-03 NOTE — PROGRESS NOTES
[facility]    Progress Note - Hospitalist Service       Date of Admission:  11/1/2023    Assessment & Plan   Irene León is a 78 year old female admitted on 11/1/2023. 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    SBO  CT on admission showed high-grade mechanical small bowel obstruction and dilated fluid-filled loops of small bowel.  Likely secondary to known cancers.  WBC 39.7, lactic acid wnl on admission. Gastrografin study was completed 11/2 and showed complete bowel obstruction  -General surgery consult: Appreciate recommendations and cares  -NG tube on LIS for bowel decompression  -Okay for clear liquid diet for comfort  -No surgical intervention  -Scheduled IV dilaudid 1 mg q2h, with 0.5 mg q30m  -PRN IV compazine  -AM CBC     Anxiety  Has home hydroxyzine 25-50 mg q6h PRN, but unable to take due to NPO and do not want to use IM.  Exacerbated by NG tube.  -PTA lorazepam 0.25-0.5 mg q4h PRN     Non-small cell lung carcinoma with metastatic disease including brain  Invasive ductal carcinoma of left breast  CT showing increasing right adrenal loculated nodularity with surrounding high attenuation.  Hospice meeting scheduled for 11/2/2023 outpatient with a hospice house.  Daughter Leelee stated they would reassess tomorrow 11/3 if they wanted to speak with hospice inpatient.  -Follows with Dr. Suarez with [facility] oncology     COPD  Oxygen dependent at 2-3 L at baseline.  Previously hospitalization from 10/25-10/28 for COPD exacerbation.  -PTA Breo Ellipta 1 puff at bedtime, DuoNeb PRN, Mucomyst PRN  -Supplemental oxygen with NC  -Continuous pulse ox     HFrEF  Last echo completed on 6/22/2023 with an ejection fraction of 20-25%, grade 3 diastolic dysfunction, decreased ventricle systolic function.  On  spironolactone 25 mg daily and metoprolol succinate 50 mg at bedtime  -Restart medication once able to tolerate oral medications  -Gentle fluids, monitor closely     Atrial fibrillation  Previously on rivaroxaban.  History of hemorrhagic metastatic brain disease masses noted on previous hospitalization.  Rivaroxaban discontinued.  -PRN metoprolol 5mg for sustained tachycardia with HR>130 for 10 minutes     Leukocytosis   Secondary to steroid medications.  Patient was started on dexamethasone 1 Mg twice daily by oncology and is also on a prednisone taper, currently on 20 mg daily for 3 days then 10 mg for 3 days.  Currently holding both medications due to n.p.o. status.  -Methylprednisolone 16 mg for 3 days     Chronic medications:  Mood: Hold PTA Lexapro 10 mg daily, IM 5 mg Zyprexa at bedtime  Allergies: Hold Flonase and cetirizine  Overactive bladder: Hold mirabegron   Hypercholesterolemia: Hold simvastatin 40 mg at bedtime         Diet: NPO for Medical/Clinical Reasons Except for: No Exceptions    DVT Prophylaxis: SCDs, no medication due to history of hemorrhagic metastatic brain masses and adrenal masses  Hayes Catheter: Not present  Fluids: IV NS 75 mL/hour  Lines: None       Cardiac Monitoring: None  Code Status: No CPR- Do NOT Intubate      Clinically Significant Risk Factors              # Hypoalbuminemia: Lowest albumin = 3.4 g/dL at 11/1/2023 10:17 PM, will monitor as appropriate     # Hypertension: Noted on problem list    # Chronic heart failure with reduced ejection fraction: last echo with EF <40%           # Financial/Environmental Concerns: none  # COPD: noted on problem list        Disposition Plan      Expected Discharge Date: 11/05/2023      Destination: home with help/services;inpatient hospice (Home vs. Inpt Hospice at [facility])  Discharge Comments: NG, pain control, not medically ready        The patient's care was discussed with the Attending Physician, Dr. Perez .    Kriss Franz MD  PGY1  Hospitalist Service  [facility]  Securely message with [employer] (more info)  Text page via [employer] Paging/Directory   ______________________________________________________________________    Interval History   Patient was laying in bed when I saw her.  She had several episodes overnight where she required extra pain medication or medication for anxiety.  When I saw her the patient was laying in bed and I helped her use a mouth swab due to her dry mouth.  She has been using Biotene which has been somewhat helpful.  We discussed that her daughter was coming soon and she expressed gratefulness for her daughter.  She received a dose of her scheduled Dilaudid as I was leaving.    Dr. Perez and I met with and discussed the plan with daughter Leelee around 10 AM.  We also met with palliative care who will be touching base with the daughter and the patient.  They plan to have family come visit today.    Physical Exam   Vital Signs: Temp: 97.2  F (36.2  C) Temp src: Oral BP: 118/84 Pulse: 116   Resp: 20 SpO2: 91 % O2 Device: Nasal cannula Oxygen Delivery: 4 LPM  Weight: 111 lbs 0 oz    Constitutional: Awake, mild distress, looks uncomfortable  HEENT: Normocephalic, without obvious abnormality, atraumatic, nasal cannula and NG in place  Respiratory: No increased work of breathing, diffuse inspiratory and expiratory wheezing, no crackles, coarse breath sounds  Cardiovascular: Regular rate and rhythm, normal S1 and S2, and no murmur noted  GI: Hypoactive bowel sounds, soft, non-distended  Extremities: Mild bilateral trace edema      Data     I have personally reviewed the following data over the past 24 hrs:    39.5 (H)  \   11.0 (L)   / 191     N/A N/A N/A /  N/A   N/A N/A N/A \       Imaging results reviewed over the past 24 hrs:   Recent Results (from the past 24 hour(s))   XR Gastrografin Challenge    Narrative    EXAM: XR GASTROGRAFIN CHALLENGE  LOCATION: [facility]  HOSPITAL  DATE: 11/2/2023    INDICATION: Small Bowel Obstruction  COMPARISON: 11/2/2023, 0031 hours.      Impression    IMPRESSION: Images acquired 8 hours following administration of oral contrast material. Contrast opacifies the stomach and a small amount within the duodenum. Possible dilute contrast within the jejunum as well, but no contrast within the colon. Dilated   loops of small bowel are similar to the prior radiograph. Enteric decompression tube in the stomach.

## 2023-11-03 NOTE — CONSULTS
Palliative Care Consultation Note  [facility]      Patient: Irene León  Date of Admission:  11/1/2023    Requesting Clinician / Team: Alia Rayo PA-C  Reason for consult: Symptom management  Goals of care     Recommendations & Counseling     GOALS OF CARE:   Comfort focused   Desires to continue IV fluids at low rate until brother can arrive.  Should IV infiltrate would not want to replace it.  Support provided to family.  They are gathering.  Patient's son, Yovanny, flying in from [place] and hopes to arrive this evening to see his mother before she passes.  Patient's sister and other family members gathering to visit with her today.  Daughter, Leelee, son-in-law and grandson at bedside presently    ADVANCE CARE PLANNING:  No health care directive on file. Per  informed consent policy, next of kin should be involved if patient becomes unable.  There is a POLST form on file, but will need to be updated prior to discharge.  Code status: No CPR- Do NOT Intubate    MEDICAL MANAGEMENT:   Comfort Care   Below are common symptoms routinely seen in patients with comfort focused goals and at the end of life. This patient may not currently exhibit all of these symptoms; however, if these were to occur our recommendations are as follows:     #Pain, chronic pain syndrome and cancer related pain  Patient has utilized 5 mg IV Dilaudid in the last 24 hours which is equal potent to 100 oral morphine equivalents or 70 mg oral oxycodone.  Patient allergic to morphine per record so we will utilize concentrated oxycodone.  Currently on scheduled hydromorphone 1 mg IV every 2 hours for pain and relief of dyspnea.  Discontinue scheduled IV hydromorphone once scheduled oxycodone started.  Recommend rotating to sublingual route as we transition to comfort cares and potentially lose IV access.  Will not replace IV.  Oxycodone concentrate 10 mg sublingual every 4 hours starting at 1600 after patient  sisters have had an opportunity to visit with patient.  Oxycodone 5-10 mg sublingual every hour as needed  Adjust hydromorphone dose to 0.3 mg IV q 30 minutes as needed       #Air hunger/Dyspnea   Oxycodone concentrate 10 mg sublingual every 4 hours starting at 1600 after patient sisters have had an opportunity to visit with patient.  Oxycodone 5-10 mg sublingual every hour as needed  Adjust hydromorphone dose to 0.3 mg IV q 30 minutes as needed    Lorazepam 0.25-0.5 mg IV or sublingual concentrate every 4 hours as needed     #Anxiety    Lorazepam 0.25-0.5 mg IV or sublingual concentrate every 4 hours as needed    #Secretion burden   Robinul 0.2 mg IV  q 4 hours as needed. If ineffective, increase up to 0.3 mg   Consider atropine 2 drops SL q4h prn  if Robinul ineffective after dose increase      #Nausea   -Zofran 4 mg IV q 6 hours as needed. Can increase to 8 mg   -Zyprexa 5 mg q 6 hours as needed     #Agitation  Aggressive symptoms control first, then  Lorazepam 0.25-0.5 mg IV or sublingual concentrate every 4 hours as needed      PSYCHOSOCIAL/SPIRITUAL:  Family      Palliative Care will continue to follow. Thank you for the consult and allowing us to aid in the care of Irene León.    These recommendations have been discussed with Dr. Franz.    LEYLA Walker, DELMIS, CNS, AOCNS  Securely message with [employer] (more info)  Text page via [facility] Paging/Directory       Palliative Summary/HPI     Irene León is a 78 year old female with PMH HFrEF (20-25%),  minimal coronary disease per angiogram, paroxysmal atrial fibrillation on Xarelto, breast cancer, non-small cell lung cancer and on sotorasib, COPD, chronic hypoxic respiratory failure on 3 L of home O2 at rest and 5-6L with exertion, chronic kidney disease who presented to the ED on 11/1/2023 with complaints of increased SOB and Abdominal discomfort and found to have SBO. NG placed for decompression.  Recent imaging has demonstrated worsening and  "progression of metastatic NSCLC with increased size of multiple pulmonary nodules and thoracic lymph nodes compared to 9/28/2023 and \"numerous retroperitoneal/perinephric nodules in the visualized upper abdomen appear to be new compared to 9/29/2023 and also worrisome for metastatic disease.\"   Recent MRI brain showed 2 mets 3.6 cm and 2.4 cm in occipital lobes with vasogenic edema. Sking biopsy last admission revealed poorly differentiated NSCLC. Oncology recommending no further cancer directed treatments and to involve hospice at this time.     She is followed in outpatient palliative care clinic, last seen on 8/15/23.    Today, the patient was seen for:  Symptom management and goals of care.    Palliative Care Summary:   Met with Leelee cruz, son-in-law and grandson.   Briefly reviewed role of palliative care team as family aware of palliative care being available for support.     Prognosis, Goals, & Planning:    Functional Status just prior to this current hospitalization:  ECOG3 (Capable of only limited self-care; needs help with ADLs; in bed/chair >50% of waking hours)  Recurrent hospitalizations-this is her 7th admission since 6/2023 for recurrent acute hypoxic respiratory failure and CHF exacerbation.     Prognosis, Goals, and/or Advance Care Planning:  We discussed general treatment options (full/restorative, selective/conservatives, and comfort only/hospice). We then discussed how these specifically apply to Stephanie.  Based on this discussion, daughterLeelee, has decided to focus on symptom management and transition to comfort focused cares.    Education provided on transition to comfort-focused goals of care would be including discontinuation of IV fluids, cardiac monitoring, labs, tube feeding, TPN and other interventions that do not directly promote comfort.  Anticipatory guidance was given regarding feeding, hunger, fluids at end of life. We discussed utilization of medications to ease air hunger, " agitation and restlessness at the time that ventilator is compassionately withdrawn.  Discussed that this process is very purposeful in terms of ensuring patient is as comfortable as possible and that family wishes are honored.  Education provided regarding hospice philosophy, prognostic,and eligibility criteria. Discussed what services are provided and those that are not,  Discussed common misconceptions. We explored the various disposition options where they can receive hospice care (home, residential hospice homes, LTC with hospice) including subsequent financial and familial implications. Discussed typical anticipated timing of discharge.  Awaiting family to arrive from out of state prior to discontinuing IV fluids.    No further labs.    Code Status was addressed today:   DNR/I confirmed    Patient's decision making preferences: independently  However, currently patient lacks decision-making capacity today for complex decisions.            Coping, Meaning, & Spirituality:   Mood, coping, and/or meaning in the context of serious illness were addressed today: Yes    Social:   Living situation: Previously lived with her  independently with family support.  Has been in and out of the hospital.  Recently lost her  and has been living in a TCU or inpatient at [facility] for the last 3 to 4 weeks  Important relationships/caregivers: SonYovanny and daughterLeelee, their spouses and children  Areas of fulfillment/mel: Family and grandchildren     Past Medical History:  Past Medical History:   Diagnosis Date    ANATOLIY (acute kidney injury) (H24)     Allergic rhinitis     Arrhythmia     Atrial fibrillation with RVR (H)     Bacteremia     Breast cancer (H) 2017    Cardiomyopathy (H)     Centrilobular emphysema (H)     CHF (congestive heart failure) (H)     Chronic kidney disease     CKD (chronic kidney disease)     COPD (chronic obstructive pulmonary disease) (H)     Coronary artery disease     Depression      Dysphagia     E. coli sepsis (H)     Factor 5 Leiden mutation, heterozygous (H24)     GERD (gastroesophageal reflux disease)     Heart failure with reduced ejection fraction (H) 04/17/2023    Hx of radiation therapy 2017    Hyperlipidemia     Hypertension     Hypokalemia     Hypomagnesemia     Idiopathic cardiomyopathy (H)     Left hip pain 04/30/2014    OAB (overactive bladder)     Osteoporosis     Sacral insufficiency fracture     Sinusitis     Syncope     Urge incontinence     Vocal cord dysfunction         Past Surgical History:  Past Surgical History:   Procedure Laterality Date    ARTHROPLASTY REVISION HIP Left     BIOPSY BREAST Right 2017    BLADDER SURGERY      bladder interstim with removal    CARDIAC CATHETERIZATION      CATARACT EXTRACTION Left 07/18/2017    IR ABDOMINAL AORTOGRAM  4/16/2003    IR AORTIC ARCH 4 VESSEL ANGIOGRAM  4/16/2003    IR MISCELLANEOUS PROCEDURE  4/16/2003    LUMPECTOMY BREAST Left 06/2017    x2    PICC DOUBLE LUMEN PLACEMENT  4/11/2022         PICC TRIPLE LUMEN PLACEMENT  4/7/2022         PICC TRIPLE LUMEN PLACEMENT  10/25/2023    ZZC OPEN TX FEMORAL FRACTURE DISTAL MED/LAT CONDYLE Left 10/28/2015    Procedure: OPEN REDUCTION INTERNAL FIXATION LEFT  PROXIMAL FEMUR PERIPROSTHETIC FRACTURE;  Surgeon: Yovanny Albarran MD;  Location: [facility];  Service: Orthopedics         Family History:  Family History   Problem Relation Age of Onset    Osteoporosis Other     Prostate Cancer Brother     Breast Cancer Maternal Aunt         age thought to be in her 70's-80's    Prostate Cancer Maternal Uncle         Medications:  I have reviewed this patient's medication profile and medications from this hospitalization.     ROS:  Unable to obtain as patient unresponsive.    PHYSICAL EXAM:  Temp:  [97.2  F (36.2  C)-98.3  F (36.8  C)] 97.2  F (36.2  C)  Pulse:  [] 116  Resp:  [18-22] 20  BP: (118-180)/(70-84) 118/84  SpO2:  [90 %-98 %] 91 %  Wt Readings from Last 3 Encounters:    11/01/23 50.3 kg (111 lb)   11/02/23 50.3 kg (111 lb)   11/01/23 48.5 kg (107 lb)      General appearance: fatigued, no distress, and unresponsive  Head: Normocephalic, without obvious abnormality, atraumatic, temporal wasting noted  Eyes: Lids and lashes normal  Nose: NG in left nostril.  Nasal cannula in place  Throat: Lips without lesions.  Oral mucosa   Lungs: Inspiratory and expiratory wheezes noted.  Heart: Regular rate  Abdomen: Soft, hypoactive bowel sounds lower quadrants  Extremities: No cyanosis or edema noted  Neurologic: Unresponsive          RADIOLOGY:  XR Gastrografin Challenge    Result Date: 11/2/2023  EXAM: XR GASTROGRAFIN CHALLENGE LOCATION: [facility] DATE: 11/2/2023 INDICATION: Small Bowel Obstruction COMPARISON: 11/2/2023, 0031 hours.     IMPRESSION: Images acquired 8 hours following administration of oral contrast material. Contrast opacifies the stomach and a small amount within the duodenum. Possible dilute contrast within the jejunum as well, but no contrast within the colon. Dilated loops of small bowel are similar to the prior radiograph. Enteric decompression tube in the stomach.     XR Abdomen Port 1 View    Result Date: 11/2/2023  EXAM: XR ABDOMEN PORT 1 VIEW LOCATION: [facility] DATE: 11/2/2023 INDICATION: sp ng tube placement COMPARISON: None.     IMPRESSION: NG tube in good position within stomach. Multiple loops of mildly dilated small bowel are present. Contrast within bladder. Scoliosis and left hip arthroplasty.    CT Abdomen Pelvis w Contrast    Result Date: 11/1/2023  EXAM: CT ABDOMEN PELVIS W CONTRAST LOCATION: [facility] DATE: 11/1/2023 INDICATION: abdominal pain; h o lung cancer with mets COMPARISON: 09/29/2023 TECHNIQUE: CT scan of the abdomen and pelvis was performed following injection of IV contrast. Multiplanar reformats were obtained. Dose reduction techniques were used. CONTRAST:  [id number] 75ml FINDINGS: LOWER CHEST: Tiny right pleural effusion with associated right lower lobe airspace opacities. Previously noted a 0.3 cm middle lobe nodule, unchanged compared to 09/29/2023. Mild stable elevation right hemidiaphragm. Small hiatal hernia. HEPATOBILIARY: The gallbladder is distended. Clinical correlation for associated symptomatology recommended. Interval development of low attenuation in the liver adjacent to the leigh ann hepatis likely representing fatty infiltration. Scattered stable tiny cysts in the liver. No calcified gallstones. Interval increasing fluid adjacent to the anterior and right lateral liver PANCREAS: No significant mass, duct dilatation, or inflammatory change. SPLEEN: Normal. ADRENAL GLANDS: Increasing right adrenal loculated nodularity with adjacent surrounding high attenuation that extends inferiorly along the right kidney. 1.8 x 1.4 cm left adrenal stable. KIDNEYS/BLADDER: Bilateral kidneys enhance symmetrically without evidence for pyelonephritis. No significant mass, stones, or hydronephrosis. There are simple or benign cysts. No follow up is needed. BOWEL: Interval development of dilated fluid-filled loops of small bowel with transition in the pelvis. Decompressed loops of small bowel. Findings are consistent with high-grade mechanical small bowel obstruction. The etiology of the obstruction is not clearly identified. LYMPH NODES: No lymphadenopathy. VASCULATURE: No abdominal aortic aneurysm. Moderate multifocal atherosclerotic plaques and calcifications of the abdominal aorta and common iliac arteries. PELVIC ORGANS: No pelvic masses. MUSCULOSKELETAL: Left hip arthroplasty. No osteolytic or osteoblastic lesions. Mild demineralization of visualized osseous structures. Moderate to marked stable compression deformity of T12. Moderate to marked thoracolumbar spondylosis.     IMPRESSION: 1.  Interval development of dilated fluid-filled loops of small bowel with transition in  the pelvis. Decompressed loops of small bowel. Findings are consistent with high-grade mechanical small bowel obstruction. The etiology of the obstruction is not clearly identified.  2.  Increasing right adrenal loculated nodularity with adjacent surrounding high attenuation that extends inferiorly along the right kidney. 1.8 x 1.4 cm left adrenal stable. Findings based on prior reported concerning for metastatic disease. 3.  The gallbladder is distended. Clinical correlation for associated symptomatology recommended. Interval increasing fluid adjacent to the anterior and right lateral liver. 4.  Interval development of low attenuation in the liver adjacent to the leigh ann hepatis likely representing fatty infiltration. Scattered stable tiny cysts in the liver. No calcified gallstones.     US Biopsy Back Soft Tissue Superfical    Result Date: 10/27/2023  EXAM: 1. PERCUTANEOUS BIOPSY BACK 2. ULTRASOUND GUIDANCE LOCATION: [facility] DATE: 10/27/2023 INDICATION: skin nodule on chest and back. for diagnosis and molecular test. Presumed metastatic cancer. PROCEDURE: Informed consent obtained. Site marked. Prior images reviewed. Required items made available. Patient identity was confirmed verbally and with arm band. Patient reevaluated immediately before beginning the procedure. Universal protocol was followed. Time out performed. The site was prepped and draped in sterile fashion. 5 mL of 1 percent lidocaine was infused into the local soft tissues. Using standard technique and under direct ultrasound guidance, an 18 gauge biopsy needle was used to make three core biopsies. Tissue was submitted to Pathology. The patient tolerated the procedure well. No complications.     IMPRESSION: Status post US-guided biopsy right mid back lesion. Reference CPT Code: 50205, 85453    MR Brain w/o & w Contrast    Result Date: 10/27/2023  EXAM: MR BRAIN W/O and W CONTRAST LOCATION: [facility]PITAL DATE: 10/27/2023 INDICATION: Lung cancer, hemianopsia COMPARISON: Brain MRI: 06/18/2023. CONTRAST: 5 ml Gadavist given TECHNIQUE: Routine multiplanar multisequence head MRI without and with intravenous contrast. High-resolution T1-weighted postcontrast images were also obtained. FINDINGS: INTRACRANIAL CONTENTS: Interval development of two large hemorrhagic lesions with intrinsic T1 hyperintense signal abnormality and associated susceptibility artifact in the bilateral occipital lobes. Both lesions demonstrate areas of patchy contrast enhancement. The lesion on the left measures at least 36 x 32 x 44 mm (AP by TV by cc). The lesion on the right measures approximately 24 x 25 x 22 mm. Both lesions demonstrate exuberant adjacent vasogenic edema. Restricted diffusion associated with both  lesions is favored secondary to blood products. Partial effacement of the cerebral sulci overlying both lesions, left greater than right. There is local mass effect with both lesions partially narrowing the occipital horns. No evidence of hydrocephalus.  No restricted diffusion to suggest acute infarct. No extra-axial fluid collections. Patchy and confluent nonspecific T2/FLAIR hyperintensities within the cerebral white matter most consistent with moderate chronic microvascular ischemic change. Similar remote left PCA territory infarct. Moderate to advanced generalized cerebral atrophy. Normal position of the cerebellar tonsils. Basal cisterns are patent. SELLA: No abnormality accounting for technique. OSSEOUS STRUCTURES/SOFT TISSUES: Normal marrow signal. The major intracranial vascular flow voids are maintained. ORBITS: No abnormality accounting for technique. SINUSES/MASTOIDS: Scattered paranasal sinus mucosal thickening, greatest in the sphenoid sinuses. No middle ear or mastoid effusion.     IMPRESSION: 1.  Interval development of large hemorrhagic metastatic lesions in both occipital lobes as detailed above, left larger  than right. 2.  Exuberant vasogenic edema adjacent to both occipital lobe metastases with narrowing of the occipital horns bilaterally. No evidence of hydrocephalus at this time. 3.  Additional chronic changes as described in the body of the report. [Critical Result: Interval development of large hemorrhagic metastatic lesions in both occipital lobes.] Finding was identified on 10/27/2023 12:08 PM CDT. Dr. Franz was contacted by me on 10/27/2023 12:34 PM CDT and verbalized understanding of the critical result.     XR Chest Port 1 View    Result Date: 10/25/2023  EXAM: XR CHEST PORT 1 VIEW LOCATION: [facility] DATE: 10/25/2023 INDICATION: RN placed PICC   verify tip placement COMPARISON: CT 10/25/2023, 1804 hours     IMPRESSION: Right upper extremity PICC tip in the mid SVC. Grossly unchanged emphysema, pulmonary nodules, right hilar adenopathy.    Chest CT w/o contrast    Result Date: 10/25/2023  EXAM: CT CHEST W/O CONTRAST LOCATION: [facility] DATE: 10/25/2023 INDICATION: Dyspnea, productive cough COMPARISON: 9/28/2023 TECHNIQUE: CT chest without IV contrast. Multiplanar reformats were obtained. Dose reduction techniques were used. CONTRAST: None. FINDINGS: LUNGS AND PLEURA: Slightly decreased size of the peripheral right upper lobe pulmonary nodule measuring 1.7 x 1.4 cm (5/76), previously 1.9 x 1.9 cm, prior to that 2.3 x 2.1 cm. However, there is again increased size of many other satellite nodules bilaterally. For example: -Centrally in the right upper lobe, 2.1 x 2.0 cm (5/60), previously 0.7 x 0.5 cm. -Left lower lobe subpleural nodule, 1.0 x 0.9 cm (5/71), previously 0.7 x 0.7 cm. Advanced emphysema with areas of atelectasis and scarring. Similar pleural thickening and architectural distortion in the anterior lingula. Most of the endobronchial debris has resolved compared to prior. No pleural effusion or pneumothorax. MEDIASTINUM/AXILLAE: Increased size  of mediastinal and right hilar lymph nodes compared to 9/20/2023. For example, a pretracheal lymph node now measures 4.2 x 4.1 cm (4/56), previously 2.1 x 1.5 cm. CORONARY ARTERY CALCIFICATION: Mild. UPPER ABDOMEN: Bilateral adrenal masses and right adrenal hemorrhage, better demonstrated on CT 9/29/2023. Small perinephric nodules are new compared to 9/29/2023. MUSCULOSKELETAL: No aggressive or destructive lesions. Unchanged T12 compression deformity.     IMPRESSION: 1.  Worsening metastatic disease with increased size of multiple pulmonary nodules and thoracic lymph nodes compared to 9/28/2023. 2.  Numerous retroperitoneal/perinephric nodules in the visualized upper abdomen appear to be new compared to 9/29/2023 and also worrisome for metastatic disease. Bilateral adrenal nodules and right adrenal hemorrhage were better characterized on prior contrast-enhanced CT.     XR Chest Port 1 View    Result Date: 10/25/2023  EXAM: XR CHEST PORT 1 VIEW LOCATION: [facility] DATE: 10/25/2023 INDICATION: Dyspnea, respiratory failure COMPARISON: Chest x-ray 09/28/2023 and CT chest 09/28/2023     IMPRESSION: Heart size within normal limits. Enlarged right hilum representing adenopathy as seen on CT. Pulmonary vascularity normal. Mid right lung nodule, better appreciated on CT. Subsegmental atelectasis or scarring in the bases. No acute infiltrates or effusions.     Data reviewed:  Results for orders placed or performed during the hospital encounter of 11/01/23 (from the past 24 hour(s))   XR Gastrografin Challenge    Narrative    EXAM: XR GASTROGRAFIN CHALLENGE  LOCATION: [facility]  DATE: 11/2/2023    INDICATION: Small Bowel Obstruction  COMPARISON: 11/2/2023, 0031 hours.      Impression    IMPRESSION: Images acquired 8 hours following administration of oral contrast material. Contrast opacifies the stomach and a small amount within the duodenum. Possible dilute contrast within  the jejunum as well, but no contrast within the colon. Dilated   loops of small bowel are similar to the prior radiograph. Enteric decompression tube in the stomach.       CBC with platelets   Result Value Ref Range    WBC Count 39.5 (H) 4.0 - 11.0 10e3/uL    RBC Count 3.89 3.80 - 5.20 10e6/uL    Hemoglobin 11.0 (L) 11.7 - 15.7 g/dL    Hematocrit 37.4 35.0 - 47.0 %    MCV 96 78 - 100 fL    MCH 28.3 26.5 - 33.0 pg    MCHC 29.4 (L) 31.5 - 36.5 g/dL    RDW 15.6 (H) 10.0 - 15.0 %    Platelet Count 191 150 - 450 10e3/uL   Basic metabolic panel   Result Value Ref Range    Sodium 147 (H) 135 - 145 mmol/L    Potassium 4.2 3.4 - 5.3 mmol/L    Chloride 113 (H) 98 - 107 mmol/L    Carbon Dioxide (CO2) 25 22 - 29 mmol/L    Anion Gap 9 7 - 15 mmol/L    Urea Nitrogen 17.8 8.0 - 23.0 mg/dL    Creatinine 1.16 (H) 0.51 - 0.95 mg/dL    GFR Estimate 48 (L) >60 mL/min/1.73m2    Calcium 8.4 (L) 8.8 - 10.2 mg/dL    Glucose 98 70 - 99 mg/dL     *Note: Due to a large number of results and/or encounters for the requested time period, some results have not been displayed. A complete set of results can be found in Results Review.        110 MINUTES SPENT BY ME on the date of service doing chart review, history, exam, documentation & further activities per the note.

## 2023-11-03 NOTE — PROGRESS NOTES
"Brief Progress Note    Assessment/Plan  Irene León is a 78 year old female with a past medical history of non-small cell lung carcinoma with metastases including brain, breast cancer, right-sided adrenal hemorrhage, HFrEF, COPD with oxygen dependence, anxiety.  She is admitted on 11/1/2023 for small bowel obstruction. NG tube in place. Gastrografin study completed today showing completed bowel obstruction.    SBO  -NG returned to intermittent suction.  -Dilaudid 0.5 mg IV scheduled at daughter's request. Hold if patient is sleeping or becoming sedated.  -Continue PRN IV hydroxyzine and lorazepam     Please update daughter as needed with change in patient's medical course.    Subjective  Spoke with daughter and son about patient. They express frustration with pain control. They request pain medications be scheduled instead of as needed. Daughter states patient will not ask for medications and has peripheral vision loss, so she has difficulty hitting her call light. Daughter endorses having medical decisional power for patient and requests she \"be sedated instead of pain.\" Daughter also endorses the family had a plan to start hospice for patient next week. Her biggest wish is that her mother not be in pain.    Objective  Vital signs:  Temp: 98.2  F (36.8  C) Temp src: Oral BP: (!) 158/70 Pulse: 104   Resp: 20 SpO2: 91 % O2 Device: Nasal cannula Oxygen Delivery: 4 LPM   Weight: 50.3 kg (111 lb)  Estimated body mass index is 22.42 kg/m  as calculated from the following:    Height as of an earlier encounter on 11/1/23: 1.499 m (4' 11\").    Weight as of this encounter: 50.3 kg (111 lb).    General appearance: alert, in mild distress  Ears: hearing grossly intact  Nose: NG in place      Kaylee Lamb MD, PGY3  [facility]    "

## 2023-11-04 NOTE — PROGRESS NOTES
Administration of Ativan 0.5mg PO 1231,  1mg IV 1248, verbal order to override PRN schedule    Locked administration of Morphine PO 10mg, 1258    verbal bedside orders to override by Dr. Phan

## 2023-11-04 NOTE — PLAN OF CARE
Very difficult day for Stephanie and family. Many PRNs given, see MAR. Retained urine, bladder scanned for 280mL, straight cath performed, Stephanie did not tolerate procedure well. 375 output.     Suction canister on wall has not shown output since yesterday. With repositioning Stephanie and turning suction up to High Continuous, Stephanie had 200mL of brown fluid removed.      Stephanie had episodes of respiratory distress with agitation. Multiple modalities of comfort utilized. PO/IV Ativan, Dilaudid, Oxycodone, Morphine given per orders, HOB to high fowlers. Oxy mask to 7L, sitting her at edge of bed, repositioning, cool cloth applied, sips of water given. Difficult to calm patient, family distraught. SALBADOR LUCIO at bedside from [id number]. Many new orders received. Oral suction set up with yaunker and catheter in anticipation of overwhelming secretions and possibilty of vomiting fecal matter.     End of life education provided to family, empathetic listening and emotional reassurance provided.       Problem: End-of-Life Care  Goal: Comfort, Peace and Preserved Dignity  Outcome: Not Progressing  Intervention: Promote Physical Comfort  Recent Flowsheet Documentation  Taken 11/4/2023 0905 by Amanda Green, RN  Airway/Ventilation Support: comfort measures provided  Sensory Stimulation Regulation:   care clustered   music on   quiet environment promoted  Environmental Support:   caregiver consistency promoted   calm environment promoted   rest periods encouraged  Intervention: Promote Peace and Maintain Dignity  Recent Flowsheet Documentation  Taken 11/4/2023 0905 by Amanda Green, RN  Supportive Measures:   active listening utilized   decision-making supported   relaxation techniques promoted   Goal Outcome Evaluation:

## 2023-11-04 NOTE — PLAN OF CARE
Problem: Adult Inpatient Plan of Care  Goal: Optimal Comfort and Wellbeing  Outcome: Not Progressing  Intervention: Monitor Pain and Promote Comfort  Recent Flowsheet Documentation  Taken 11/4/2023 0027 by Nahed Christopher RN  Pain Management Interventions:   medication (see MAR)   around-the-clock dosing utilized   care clustered   repositioned   rest     Problem: Pain Acute  Goal: Optimal Pain Control and Function  Outcome: Not Progressing  Intervention: Develop Pain Management Plan  Recent Flowsheet Documentation  Taken 11/4/2023 0027 by Nahed Christopher RN  Pain Management Interventions:   medication (see MAR)   around-the-clock dosing utilized   care clustered   repositioned   rest  Intervention: Prevent or Manage Pain  Recent Flowsheet Documentation  Taken 11/4/2023 0027 by Nahed Christopher RN  Sensory Stimulation Regulation:   care clustered   lighting decreased   quiet environment promoted       Goal Outcome Evaluation:    Patient continues on comfort cares, given scheduled and prn pain medications to optimize comfort. Atropine given x1 this shift for secretions. Ativan given x2 for agitation. Family at bedside, very involved in pt care. Repositioned and provided oral cares. NG tube LIS. IV fluids infusing at 25 ml/hr.

## 2023-11-04 NOTE — PROGRESS NOTES
[facility]    Progress Note - Hospitalist Service       Date of Admission:  11/1/2023    Assessment & Plan   Irene León is a 78 year old female admitted on 11/1/2023. 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 Transitioned to comfort cares 11/3/2023.     Comfort cares  - Sublingual oxycodone 10 mg q 4 hrs.   - Morphine IV/sublingual q 1 hr PRN. Can premedicate with benadryl if needed to prevent itching  - Lorazepam 0.25-0.5 mg IV or sublingual q 2 hours PRN  - Robinul 0.2 mg IV q 4 hours PRN for secretions,  consider atropine if ineffective  - Zofran, Zyprexa PRN nausea  - Straight cath PRN for comfort, [employer]ro[employer] ordered PRN        Diet: Clear Liquid Diet    Hayes Catheter: Not present  Fluids: IV NS 10 ml/hour  Lines: None       Cardiac Monitoring: None  Code Status: No CPR- Do NOT Intubate      Clinically Significant Risk Factors         # Hypernatremia: Highest Na = 147 mmol/L in last 2 days, will monitor as appropriate      # Hypoalbuminemia: Lowest albumin = 3.4 g/dL at 11/1/2023 10:17 PM, will monitor as appropriate     # Hypertension: Noted on problem list    # Chronic heart failure with reduced ejection fraction: last echo with EF <40%           # Financial/Environmental Concerns: none  # COPD: noted on problem list        Disposition Plan      Expected Discharge Date: 11/05/2023      Destination: home with help/services;inpatient hospice (Home vs. [facility])  Discharge Comments: NG, pain control, not medically ready        The patient's care was discussed with the Attending Physician, Dr. Perez .    Susi Meeks DO PGY2  Hospitalist Service  [facility]  Securely message with [employer] (more info)  Text page via [employer] Paging/Directory    ______________________________________________________________________    Interval History   Yesterday afternoon, patient transitioned to comfort cares. Overnight, was restless at times. This AM, patient complaining of urinary retention, bladder scanned for > 200 mL. Straight cathed for comfort per patient and family request.     Physical Exam   Vital Signs:                    Weight: 111 lbs 0 oz    Constitutional: Resting in bed, NG in place, pIV in place, family at bedside  HEENT: Normocephalic, without obvious abnormality, atraumatic, nasal cannula and NG in place  Respiratory: No increased work of breathing  Cardiovascular: Regular rate and rhythm  GI: Hypoactive bowel sounds, soft  Extremities: Mild bilateral trace edema      Data     I have personally reviewed the following data over the past 24 hrs:    N/A  \   N/A   / N/A     N/A N/A N/A /  N/A   N/A N/A N/A \       Imaging results reviewed over the past 24 hrs:   No results found for this or any previous visit (from the past 24 hour(s)).

## 2023-11-04 NOTE — PLAN OF CARE
Pt is on comfort cares, pt is lethargic with periods of restlessness, eye open spontaneously. Call light within reach. Family makes needs know. NG tube in place and on low intermitted suction. Pt on clear liquid diet and is still drinking water. Paged BFM because family wanted to ask about end of life symptoms. BFM came and talked with family.     Problem: End-of-Life Care  Goal: Comfort, Peace and Preserved Dignity  11/3/2023 2150 by Tori Barrera, RN  Outcome: Progressing  11/3/2023 1920 by Tori Barrera, RN  Outcome: Progressing   Goal Outcome Evaluation:

## 2023-11-04 NOTE — PROGRESS NOTES
Chart review, patient not seen.  Patient with small bowel obstruction, currently on comfort cares due to desire to avoid surgical intervention.  We are available if needed, but will otherwise sign off given patient's desires to pursue comfort care.    Carlos Weston MD, FACS  [phone]  [facility]  General and Bariatric Surgery

## 2023-11-05 NOTE — DISCHARGE SUMMARY
[facility]    Death Summary - Hospitalist Service     Date of Admission:  11/1/2023  Date of Death: 11/4/2023  Attending Provider: Dr. Janae Perez    Discharge Diagnoses   Malignant small bowel obstruction  Non-small cell lung carcinoma with metastases    Cause of death: Non-small cell lung cancer with metastases to brain and abdomen    Hospital Course   Irene León was admitted on 11/1/2023 for high-grade small bowel obstruction likely due to scar tissue and/or lung cancer metastases.  NG tube was placed for bowel decompression.  Gastrografin study completed on 11/2 showed complete bowel obstruction.  During recent hospitalization she was found to have progressing metastatic disease to her brain and abdomen from her non-small cell carcinoma lung cancer and patient had plans to meet with outpatient hospice scheduled for 11/2.  Patient and her family met with palliative care while admitted on 11/3 and at that time transitioned to comfort focused cares.  Patient's medication regimen was adjusted to control symptoms of pain and anxiety while on comfort cares.  Patient passed peacefully with her family at bedside on 11/4/2023 at 8:19 PM.    Brittany López MD  [facility]  ______________________________________________________________________      Significant Results and Procedures   Most Recent 3 CBC's:  Recent Labs   Lab Test 11/03/23  0732 11/02/23  0554 11/01/23  2217   WBC 39.5* 44.6* 39.7*   HGB 11.0* 11.9 12.0   MCV 96 95 95    205 210     Most Recent 3 BMP's:  Recent Labs   Lab Test 11/03/23  0732 11/02/23  0554 11/01/23  2217   * 141 142   POTASSIUM 4.2 4.3 4.6   CHLORIDE 113* 108* 107   CO2 25 24 25   BUN 17.8 20.6 18.8   CR 1.16* 1.27* 1.34*   ANIONGAP 9 9 10   MESSI 8.4* 8.8 9.6   GLC 98 115* 93   ,   Results for orders placed or performed during the hospital encounter of 11/01/23   CT Abdomen Pelvis w Contrast    Narrative    EXAM: CT  ABDOMEN PELVIS W CONTRAST  LOCATION: [facility]  DATE: 11/1/2023    INDICATION: abdominal pain; h o lung cancer with mets  COMPARISON: 09/29/2023  TECHNIQUE: CT scan of the abdomen and pelvis was performed following injection of IV contrast. Multiplanar reformats were obtained. Dose reduction techniques were used.  CONTRAST: [id number] 75ml    FINDINGS:   LOWER CHEST: Tiny right pleural effusion with associated right lower lobe airspace opacities. Previously noted a 0.3 cm middle lobe nodule, unchanged compared to 09/29/2023. Mild stable elevation right hemidiaphragm. Small hiatal hernia.    HEPATOBILIARY: The gallbladder is distended. Clinical correlation for associated symptomatology recommended. Interval development of low attenuation in the liver adjacent to the leigh ann hepatis likely representing fatty infiltration. Scattered stable tiny   cysts in the liver. No calcified gallstones. Interval increasing fluid adjacent to the anterior and right lateral liver    PANCREAS: No significant mass, duct dilatation, or inflammatory change.    SPLEEN: Normal.    ADRENAL GLANDS: Increasing right adrenal loculated nodularity with adjacent surrounding high attenuation that extends inferiorly along the right kidney. 1.8 x 1.4 cm left adrenal stable.    KIDNEYS/BLADDER: Bilateral kidneys enhance symmetrically without evidence for pyelonephritis. No significant mass, stones, or hydronephrosis. There are simple or benign cysts. No follow up is needed.    BOWEL: Interval development of dilated fluid-filled loops of small bowel with transition in the pelvis. Decompressed loops of small bowel. Findings are consistent with high-grade mechanical small bowel obstruction. The etiology of the obstruction is not   clearly identified.    LYMPH NODES: No lymphadenopathy.    VASCULATURE: No abdominal aortic aneurysm. Moderate multifocal atherosclerotic plaques and calcifications of the abdominal aorta and common  iliac arteries.    PELVIC ORGANS: No pelvic masses.    MUSCULOSKELETAL: Left hip arthroplasty. No osteolytic or osteoblastic lesions. Mild demineralization of visualized osseous structures. Moderate to marked stable compression deformity of T12. Moderate to marked thoracolumbar spondylosis.      Impression    IMPRESSION:   1.  Interval development of dilated fluid-filled loops of small bowel with transition in the pelvis. Decompressed loops of small bowel. Findings are consistent with high-grade mechanical small bowel obstruction. The etiology of the obstruction is not   clearly identified.      2.  Increasing right adrenal loculated nodularity with adjacent surrounding high attenuation that extends inferiorly along the right kidney. 1.8 x 1.4 cm left adrenal stable. Findings based on prior reported concerning for metastatic disease.    3.  The gallbladder is distended. Clinical correlation for associated symptomatology recommended. Interval increasing fluid adjacent to the anterior and right lateral liver.    4.  Interval development of low attenuation in the liver adjacent to the leigh ann hepatis likely representing fatty infiltration. Scattered stable tiny cysts in the liver. No calcified gallstones.    XR Abdomen Port 1 View    Narrative    EXAM: XR ABDOMEN PORT 1 VIEW  LOCATION: [facility]  DATE: 11/2/2023    INDICATION: sp ng tube placement  COMPARISON: None.      Impression    IMPRESSION: NG tube in good position within stomach. Multiple loops of mildly dilated small bowel are present.  Contrast within bladder. Scoliosis and left hip arthroplasty.   XR Gastrografin Challenge    Narrative    EXAM: XR GASTROGRAFIN CHALLENGE  LOCATION: [facility]  DATE: 11/2/2023    INDICATION: Small Bowel Obstruction  COMPARISON: 11/2/2023, 0031 hours.      Impression    IMPRESSION: Images acquired 8 hours following administration of oral contrast material. Contrast opacifies the  stomach and a small amount within the duodenum. Possible dilute contrast within the jejunum as well, but no contrast within the colon. Dilated   loops of small bowel are similar to the prior radiograph. Enteric decompression tube in the stomach.         *Note: Due to a large number of results and/or encounters for the requested time period, some results have not been displayed. A complete set of results can be found in Results Review.       Consultations This Hospital Stay   SURGERY GENERAL IP CONSULT  CARE MANAGEMENT / SOCIAL WORK IP CONSULT  PALLIATIVE CARE ADULT IP CONSULT  SPIRITUAL HEALTH SERVICES IP CONSULT    Primary Care Physician   Paola Rose

## 2023-11-05 NOTE — PROGRESS NOTES
Scheduled  pain medication and PRN morphine given on shift. Pt passed away on shift. paged BFM to pronounce. Life support called on shift. Pt is not a candidate and given okay to release to  home. Called [facility]  home per family request and  home picked up pt.

## 2023-11-05 NOTE — DEATH PRONOUNCEMENT
House Officer Death Note     Time of Death: 8:19 PM   Date of Death: 2023    Subjective: Called to bedside for pronouncement of death.     Objective:   Gen: Unresponsive. Pale. Moribund.   HEENT: Pupils mid-fixed.   CV: Pulseless. No heart sounds.   Pulm: Apneic. No breath sounds.   Neuro: Pupils fixed. No spontaneous movement. No response to noxious stimuli.   Skin: Cold, pale.     After examining the patient and finding no signs of life, the patient was pronounced .     Death certificate and cremation request to be completed by the patient's attending physician.    Rest in peace, Irene López MD PGY2 2023  [facility]

## 2023-11-06 ENCOUNTER — DOCUMENTATION ONLY (OUTPATIENT)
Dept: GERIATRICS | Facility: CLINIC | Age: 78
End: 2023-11-06
Payer: MEDICARE

## 2023-11-06 ENCOUNTER — PATIENT OUTREACH (OUTPATIENT)
Dept: ONCOLOGY | Facility: CLINIC | Age: 78
End: 2023-11-06
Payer: MEDICARE

## 2023-11-06 NOTE — PROGRESS NOTES
[facility]: Cancer Care                                                                                          Patient .  Removing self from RNCC.    Signature:  Erin Nogueira RN

## 2024-01-08 LAB
PATH REPORT.ADDENDUM SPEC: ABNORMAL
PATH REPORT.COMMENTS IMP SPEC: ABNORMAL
PATH REPORT.COMMENTS IMP SPEC: YES
PATH REPORT.FINAL DX SPEC: ABNORMAL
PATH REPORT.GROSS SPEC: ABNORMAL
PATH REPORT.MICROSCOPIC SPEC OTHER STN: ABNORMAL
PATH REPORT.RELEVANT HX SPEC: ABNORMAL
PHOTO IMAGE: ABNORMAL

## 2024-03-05 NOTE — TELEPHONE ENCOUNTER
Medication Question or Clarification  Who is calling: Patient  What medication are you calling about? (include dose and sig)    Disp Refills Start End    traZODone (DESYREL) 50 MG tablet 30 tablet 0 4/8/2019     Sig - Route: Take 1 tablet (50 mg total) by mouth at bedtime as needed. - Oral    Sent to pharmacy as: traZODone (DESYREL) 50 MG tablet    E-Prescribing Status: Receipt confirmed by pharmacy (4/8/2019  9:29 AM CDT       Disp Refills Start End    solifenacin (VESICARE) 10 MG tablet        Sig - Route: Take 5 mg by mouth at bedtime.    - Oral    Class: Historical Med      Who prescribed the medication?:   - Trazodone: Collette Torres NP  - Vesicare: Historical  What is your question/concern?: Patient is requesting the above medications [facility]  Patient is also requesting the Vesicare be filled for a 90 day supply with refills.  Pharmacy: [facility]  Okay to leave a detailed message?: Yes  [phone]  Site CMT - Please call the pharmacy to obtain any additional needed information.   0

## 2024-12-11 NOTE — TELEPHONE ENCOUNTER
Abdomen , soft, nontender, nondistended , no guarding or rigidity , no masses palpable , normal bowel sounds , Liver and Spleen,  no hepatosplenomegaly , liver nontender Does pt need to be seen now?
